# Patient Record
Sex: MALE | Race: WHITE | NOT HISPANIC OR LATINO | Employment: OTHER | URBAN - METROPOLITAN AREA
[De-identification: names, ages, dates, MRNs, and addresses within clinical notes are randomized per-mention and may not be internally consistent; named-entity substitution may affect disease eponyms.]

---

## 2017-01-30 ENCOUNTER — HOSPITAL ENCOUNTER (OUTPATIENT)
Dept: PAIN MEDICINE | Facility: HOSPITAL | Age: 72
Discharge: HOME | End: 2017-01-30
Attending: PHYSICAL MEDICINE & REHABILITATION | Admitting: PHYSICAL MEDICINE & REHABILITATION

## 2017-04-13 ENCOUNTER — HOSPITAL ENCOUNTER (OUTPATIENT)
Dept: PAIN MEDICINE | Facility: HOSPITAL | Age: 72
Discharge: HOME | End: 2017-04-13
Attending: PHYSICAL MEDICINE & REHABILITATION | Admitting: PHYSICAL MEDICINE & REHABILITATION

## 2017-04-13 LAB
AMPHETAMINES UR QL SCN: NEGATIVE
BZE UR QL SCN: NEGATIVE
CREAT 24H UR-MCNC: NORMAL MG/DL
METHADONE UR QL SCN: NEGATIVE
OPIATE CONFIRMATION URINE: NORMAL
THC SERPLBLD CFM-MCNC: NEGATIVE NG/ML

## 2017-06-08 ENCOUNTER — HOSPITAL ENCOUNTER (OUTPATIENT)
Dept: PAIN MEDICINE | Facility: HOSPITAL | Age: 72
Discharge: HOME | End: 2017-06-08
Attending: PHYSICAL MEDICINE & REHABILITATION | Admitting: PHYSICAL MEDICINE & REHABILITATION

## 2017-08-10 ENCOUNTER — HOSPITAL ENCOUNTER (OUTPATIENT)
Dept: PAIN MEDICINE | Facility: HOSPITAL | Age: 72
Discharge: HOME | End: 2017-08-10
Attending: PHYSICAL MEDICINE & REHABILITATION | Admitting: PHYSICAL MEDICINE & REHABILITATION

## 2017-10-24 ENCOUNTER — HOSPITAL ENCOUNTER (OUTPATIENT)
Dept: PAIN MEDICINE | Facility: HOSPITAL | Age: 72
Discharge: HOME | End: 2017-10-24
Attending: PHYSICAL MEDICINE & REHABILITATION | Admitting: PHYSICAL MEDICINE & REHABILITATION

## 2017-11-09 ENCOUNTER — OFFICE (OUTPATIENT)
Dept: URBAN - METROPOLITAN AREA CLINIC 64 | Facility: CLINIC | Age: 72
End: 2017-11-09

## 2017-11-09 VITALS
SYSTOLIC BLOOD PRESSURE: 155 MMHG | HEART RATE: 80 BPM | HEIGHT: 73 IN | WEIGHT: 252 LBS | DIASTOLIC BLOOD PRESSURE: 88 MMHG

## 2017-11-09 DIAGNOSIS — K76.0 FATTY (CHANGE OF) LIVER, NOT ELSEWHERE CLASSIFIED: ICD-10-CM

## 2017-11-09 DIAGNOSIS — Z86.010 PERSONAL HISTORY OF COLONIC POLYPS: ICD-10-CM

## 2017-11-09 DIAGNOSIS — K29.50 UNSPECIFIED CHRONIC GASTRITIS WITHOUT BLEEDING: ICD-10-CM

## 2017-11-09 DIAGNOSIS — K21.9 GASTRO-ESOPHAGEAL REFLUX DISEASE WITHOUT ESOPHAGITIS: ICD-10-CM

## 2017-11-09 PROCEDURE — 99213 OFFICE O/P EST LOW 20 MIN: CPT | Performed by: NURSE PRACTITIONER

## 2017-12-19 ENCOUNTER — HOSPITAL ENCOUNTER (OUTPATIENT)
Dept: PAIN MEDICINE | Facility: HOSPITAL | Age: 72
Discharge: HOME | End: 2017-12-19
Attending: PHYSICAL MEDICINE & REHABILITATION | Admitting: PHYSICAL MEDICINE & REHABILITATION

## 2018-02-26 ENCOUNTER — HOSPITAL ENCOUNTER (OUTPATIENT)
Dept: PAIN MEDICINE | Facility: HOSPITAL | Age: 73
Discharge: HOME | End: 2018-02-26
Attending: PHYSICAL MEDICINE & REHABILITATION | Admitting: PHYSICAL MEDICINE & REHABILITATION

## 2018-04-30 ENCOUNTER — HOSPITAL ENCOUNTER (OUTPATIENT)
Dept: PAIN MEDICINE | Facility: HOSPITAL | Age: 73
Discharge: HOME | End: 2018-04-30
Attending: PHYSICAL MEDICINE & REHABILITATION | Admitting: PHYSICAL MEDICINE & REHABILITATION

## 2018-07-02 ENCOUNTER — HOSPITAL ENCOUNTER (OUTPATIENT)
Dept: PAIN MEDICINE | Facility: HOSPITAL | Age: 73
Discharge: HOME | End: 2018-07-02
Attending: PHYSICAL MEDICINE & REHABILITATION | Admitting: PHYSICAL MEDICINE & REHABILITATION

## 2018-07-05 ENCOUNTER — HOSPITAL ENCOUNTER (OUTPATIENT)
Dept: INFUSION THERAPY | Facility: HOSPITAL | Age: 73
Discharge: HOME | End: 2018-07-05
Attending: FAMILY MEDICINE | Admitting: FAMILY MEDICINE

## 2018-09-25 ENCOUNTER — HOSPITAL ENCOUNTER (OUTPATIENT)
Dept: PAIN MEDICINE | Facility: HOSPITAL | Age: 73
Discharge: HOME | End: 2018-09-25
Attending: PHYSICAL MEDICINE & REHABILITATION | Admitting: PHYSICAL MEDICINE & REHABILITATION

## 2018-11-26 ENCOUNTER — HOSPITAL ENCOUNTER (OUTPATIENT)
Dept: PAIN MEDICINE | Facility: HOSPITAL | Age: 73
Discharge: HOME | End: 2018-11-26
Attending: PHYSICAL MEDICINE & REHABILITATION | Admitting: PHYSICAL MEDICINE & REHABILITATION

## 2019-02-19 ENCOUNTER — HOSPITAL ENCOUNTER (OUTPATIENT)
Dept: PAIN MEDICINE | Facility: HOSPITAL | Age: 74
Discharge: HOME | End: 2019-02-19
Attending: PHYSICAL MEDICINE & REHABILITATION | Admitting: PHYSICAL MEDICINE & REHABILITATION

## 2019-02-19 LAB
AMPHETAMINES UR QL SCN: NEGATIVE
BARBITURATES UR QL SCN: NEGATIVE
BENZODIAZ UR QL SCN: NEGATIVE
BZE UR QL SCN: NEGATIVE
CREAT 24H UR-MCNC: ABNORMAL MG/DL
METHADONE UR QL SCN: NEGATIVE
OPIATE CONFIRMATION URINE: ABNORMAL
OPIATES TESTED UR SCN: ABNORMAL
PCP UR QL: NEGATIVE
THC SERPLBLD CFM-MCNC: NEGATIVE NG/ML

## 2019-05-21 ENCOUNTER — HOSPITAL ENCOUNTER (OUTPATIENT)
Dept: PAIN MEDICINE | Facility: HOSPITAL | Age: 74
Discharge: HOME | End: 2019-05-21
Attending: PHYSICAL MEDICINE & REHABILITATION | Admitting: PHYSICAL MEDICINE & REHABILITATION

## 2019-07-17 ENCOUNTER — OFFICE VISIT (OUTPATIENT)
Dept: SURGERY | Facility: CLINIC | Age: 74
End: 2019-07-17

## 2019-07-17 VITALS
OXYGEN SATURATION: 98 % | TEMPERATURE: 97 F | BODY MASS INDEX: 33.32 KG/M2 | WEIGHT: 246 LBS | HEART RATE: 86 BPM | DIASTOLIC BLOOD PRESSURE: 69 MMHG | HEIGHT: 72 IN | SYSTOLIC BLOOD PRESSURE: 128 MMHG

## 2019-07-17 DIAGNOSIS — L98.491 SKIN ULCER, LIMITED TO BREAKDOWN OF SKIN (HCC): Primary | ICD-10-CM

## 2019-07-17 PROBLEM — L98.499 SKIN ULCER (HCC): Status: ACTIVE | Noted: 2019-07-17

## 2019-07-17 PROCEDURE — 99203 OFFICE O/P NEW LOW 30 MIN: CPT | Performed by: SURGERY

## 2019-07-17 RX ORDER — CLOPIDOGREL BISULFATE 75 MG/1
75 TABLET ORAL DAILY
COMMUNITY
Start: 2019-06-24 | End: 2019-12-06 | Stop reason: HOSPADM

## 2019-07-17 RX ORDER — DIGOXIN 250 MCG
0.25 TABLET ORAL DAILY
COMMUNITY
End: 2019-12-18

## 2019-07-17 RX ORDER — TEMAZEPAM 30 MG/1
30 CAPSULE ORAL
COMMUNITY
End: 2019-12-06 | Stop reason: HOSPADM

## 2019-07-17 RX ORDER — ALBUTEROL SULFATE 90 UG/1
2 AEROSOL, METERED RESPIRATORY (INHALATION) EVERY 4 HOURS PRN
COMMUNITY
Start: 2019-07-01

## 2019-07-17 RX ORDER — DILTIAZEM HYDROCHLORIDE 120 MG/1
120 CAPSULE, COATED, EXTENDED RELEASE ORAL DAILY
COMMUNITY
End: 2020-12-21

## 2019-07-17 RX ORDER — LOSARTAN POTASSIUM 50 MG/1
50 TABLET ORAL EVERY MORNING
COMMUNITY
Start: 2019-06-04 | End: 2021-10-21 | Stop reason: HOSPADM

## 2019-07-17 RX ORDER — TRAZODONE HYDROCHLORIDE 100 MG/1
100 TABLET ORAL NIGHTLY
Status: ON HOLD | COMMUNITY
Start: 2019-06-20 | End: 2021-10-14

## 2019-07-17 RX ORDER — DESVENLAFAXINE SUCCINATE 50 MG/1
50 TABLET, EXTENDED RELEASE ORAL DAILY
Status: ON HOLD | COMMUNITY
End: 2019-12-03

## 2019-07-17 RX ORDER — MIRTAZAPINE 30 MG/1
30 TABLET, FILM COATED ORAL NIGHTLY
COMMUNITY
Start: 2019-07-11

## 2019-07-17 RX ORDER — ERGOCALCIFEROL 1.25 MG/1
50000 CAPSULE ORAL
COMMUNITY
End: 2020-06-08

## 2019-07-17 RX ORDER — HYDROCHLOROTHIAZIDE 25 MG/1
TABLET ORAL
COMMUNITY
Start: 2019-07-10 | End: 2019-12-06 | Stop reason: HOSPADM

## 2019-07-17 RX ORDER — OXYCODONE AND ACETAMINOPHEN 10; 325 MG/1; MG/1
TABLET ORAL
COMMUNITY
Start: 2019-06-25 | End: 2019-08-27 | Stop reason: SDUPTHER

## 2019-07-17 RX ORDER — FUROSEMIDE 40 MG/1
40 TABLET ORAL EVERY MORNING
COMMUNITY

## 2019-07-17 NOTE — PROGRESS NOTES
Subjective   Ro Nathan is a 73 y.o. male.     History of present illness  Mr. Nathan seen in the office today at the request of Dr. Jones for ulceration of his mid abdominal wall/skin recently bled profusely this is the first time this is ever happened.  States with each heartbeat but with support through both feet.  Are able to hold pressure and get it to stop.  Now has 2 small skin openings near his umbilicus.  He has had multiple abdominal surgeries.  It all started with a small umbilical hernia he states years ago and he was in hospital in a coma for 30 days sounds like he got septic and had to have mesh removed he said multiple exporter since then.  He has  a very large loss of domain incisional hernia.  He is on Plavix which complicates he is having no bleeding from this area.    Past Medical History:   Diagnosis Date   • Bruises easily    • Congenital heart defect    • Difficulty attaining erection    • Hypertension    • Poor circulation    • Shortness of breath        History reviewed. No pertinent surgical history.    Outpatient Encounter Medications as of 7/17/2019   Medication Sig Dispense Refill   • clopidogrel (PLAVIX) 75 MG tablet Take 75 mg by mouth Daily.     • desvenlafaxine (PRISTIQ) 50 MG 24 hr tablet Take 50 mg by mouth Daily.     • digoxin (LANOXIN) 250 MCG tablet Take 0.25 mg by mouth Daily.     • diltiaZEM CD (CARDIZEM CD) 120 MG 24 hr capsule Take 120 mg by mouth Daily.     • furosemide (LASIX) 40 MG tablet Take 40 mg by mouth.     • hydrochlorothiazide (HYDRODIURIL) 25 MG tablet      • losartan (COZAAR) 100 MG tablet      • LYRICA 150 MG capsule      • metoprolol tartrate (LOPRESSOR) 25 MG tablet Take 25 mg by mouth.     • mirtazapine (REMERON) 30 MG tablet      • mupirocin (BACTROBAN) 2 % ointment      • oxyCODONE-acetaminophen (PERCOCET)  MG per tablet      • Potassium Chloride 40 MEQ/15ML (20%) solution Take 10 mEq by mouth 3 (Three) Times a Day.     • temazepam (RESTORIL)  30 MG capsule Take 30 mg by mouth.     • traZODone (DESYREL) 100 MG tablet      • VENTOLIN  (90 Base) MCG/ACT inhaler As Needed.     • vitamin D (ERGOCALCIFEROL) 23702 units capsule capsule Take 50,000 Units by mouth.       No facility-administered encounter medications on file as of 7/17/2019.        Allergies   Allergen Reactions   • Haloperidol Other (See Comments)   • Morphine Itching   • Pantoprazole Other (See Comments)     Feels sick after receiving       History reviewed. No pertinent family history.    Social History     Socioeconomic History   • Marital status:      Spouse name: Not on file   • Number of children: Not on file   • Years of education: Not on file   • Highest education level: Not on file   Tobacco Use   • Smoking status: Former Smoker   • Smokeless tobacco: Never Used   Substance and Sexual Activity   • Alcohol use: No     Frequency: Never   • Drug use: No   • Sexual activity: Defer       The following portions of the patient's history were reviewed and updated as appropriate: allergies, current medications, past family history, past medical history, past social history, past surgical history and problem list.    Review of Systems    Objective   Physical Exam      Assessment/Plan   Ro was seen today for abdominal fistula.    Diagnoses and all orders for this visit:    Skin ulcer, limited to breakdown of skin (CMS/[facility])    Review of systems shows no recent change in his vision or hearing.  No change in his ability to chew eat or swallow.  He has no chest pain or shortness of breath.  No cough or wheeze.  No change in his bladder or bowel habit.  No history of anxiety or depression.  No swollen gland.  No orthopedic injury or illness.  No disease.  No endocrine disease.  No blood borne illness or transfusion or anemia.    Exam shows a pleasant 73-year-old male.  HEENT is negative heart is regular lungs are clear abdomen is markedly obese with loss of domain for large  incisional hernia.  He has 2 small skin openings about 3 mm each near the umbilicus.  No active bleeding from either presently.  He denies succus entericus coming from the opening.  Extremities show equal range of motion in the upper and lower extremities.  He has symmetrical strength and usage.  Neuro shows no obvious focal deficit.    Impression: 83-year-old with 2 skin ulcers 1 with sounds like arterial bleeding.    Plan: With his history of cirrhosis we will do ultrasound of the paraumbilical area to make sure there are no large vessels right under the skin.  If they are not and we will do a CT scan to see does have bowel stuck to this area.  We will try to avoid any surgical intervention at this time he may need to go to wound care center chronic wound treatment               Hill Bhatia DO  7/17/2019  4:22 PM

## 2019-07-24 ENCOUNTER — HOSPITAL ENCOUNTER (OUTPATIENT)
Dept: ULTRASOUND IMAGING | Facility: HOSPITAL | Age: 74
Discharge: HOME | End: 2019-07-24
Admitting: SURGERY

## 2019-07-24 DIAGNOSIS — L98.491 SKIN ULCER, LIMITED TO BREAKDOWN OF SKIN (HCC): ICD-10-CM

## 2019-07-24 PROCEDURE — 76705 ECHO EXAM OF ABDOMEN: CPT

## 2019-08-06 ENCOUNTER — TELEPHONE (OUTPATIENT)
Dept: SURGERY | Facility: CLINIC | Age: 74
End: 2019-08-06

## 2019-08-06 NOTE — TELEPHONE ENCOUNTER
Pt's wife calling to get US results from Abdomen US done on 7/17/19. I let her know that they need to come in to see DR. Bhatia to find out results. Pt cannot be seen until 8/12/19. Pt is scheduled for Monday 8/12/19 @ 1:50 pm.

## 2019-08-12 ENCOUNTER — OFFICE VISIT (OUTPATIENT)
Dept: SURGERY | Facility: CLINIC | Age: 74
End: 2019-08-12

## 2019-08-12 VITALS
BODY MASS INDEX: 33.29 KG/M2 | SYSTOLIC BLOOD PRESSURE: 148 MMHG | HEIGHT: 72 IN | DIASTOLIC BLOOD PRESSURE: 86 MMHG | HEART RATE: 89 BPM | TEMPERATURE: 96.7 F | WEIGHT: 245.8 LBS | OXYGEN SATURATION: 98 %

## 2019-08-12 DIAGNOSIS — L98.491 SKIN ULCER, LIMITED TO BREAKDOWN OF SKIN (HCC): Primary | ICD-10-CM

## 2019-08-12 PROCEDURE — 99212 OFFICE O/P EST SF 10 MIN: CPT | Performed by: SURGERY

## 2019-08-12 NOTE — PROGRESS NOTES
Subjective   Ro Nathan is a 73 y.o. male.     History of present illness  Mr. Nathan seen in the office today follow-up from the ultrasound done of the umbilical skin ulcer.  We had seen Dr. Jones's request urgently for bleeding from a skin ulcer in the area of previous midline surgery.  We did an ultrasound to make sure that he did not have large veins beneath this or other issue.    The ultrasound of the area looked unremarkable.  There are no enlarged or varicose veins.    Past Medical History:   Diagnosis Date   • Bruises easily    • Congenital heart defect    • Difficulty attaining erection    • Hypertension    • Poor circulation    • Shortness of breath        No past surgical history on file.    Outpatient Encounter Medications as of 8/12/2019   Medication Sig Dispense Refill   • clopidogrel (PLAVIX) 75 MG tablet Take 75 mg by mouth Daily.     • desvenlafaxine (PRISTIQ) 50 MG 24 hr tablet Take 50 mg by mouth Daily.     • digoxin (LANOXIN) 250 MCG tablet Take 0.25 mg by mouth Daily.     • diltiaZEM CD (CARDIZEM CD) 120 MG 24 hr capsule Take 120 mg by mouth Daily.     • furosemide (LASIX) 40 MG tablet Take 40 mg by mouth.     • hydrochlorothiazide (HYDRODIURIL) 25 MG tablet      • losartan (COZAAR) 100 MG tablet      • LYRICA 150 MG capsule      • metoprolol tartrate (LOPRESSOR) 25 MG tablet Take 25 mg by mouth.     • mirtazapine (REMERON) 30 MG tablet      • mupirocin (BACTROBAN) 2 % ointment      • oxyCODONE-acetaminophen (PERCOCET)  MG per tablet      • Potassium Chloride 40 MEQ/15ML (20%) solution Take 10 mEq by mouth 3 (Three) Times a Day.     • temazepam (RESTORIL) 30 MG capsule Take 30 mg by mouth.     • traZODone (DESYREL) 100 MG tablet      • VENTOLIN  (90 Base) MCG/ACT inhaler As Needed.     • vitamin D (ERGOCALCIFEROL) 98363 units capsule capsule Take 50,000 Units by mouth.       No facility-administered encounter medications on file as of 8/12/2019.        Allergies   Allergen  Reactions   • Haloperidol Other (See Comments)   • Morphine Itching   • Pantoprazole Other (See Comments)     Feels sick after receiving       No family history on file.    Social History     Socioeconomic History   • Marital status:      Spouse name: Not on file   • Number of children: Not on file   • Years of education: Not on file   • Highest education level: Not on file   Tobacco Use   • Smoking status: Former Smoker   • Smokeless tobacco: Never Used   Substance and Sexual Activity   • Alcohol use: No     Frequency: Never   • Drug use: No   • Sexual activity: Defer       The following portions of the patient's history were reviewed and updated as appropriate: allergies, current medications, past family history, past medical history, past social history, past surgical history and problem list.    Objective       Assessment/Plan   Ro was seen today for follow-up.    Diagnoses and all orders for this visit:    Skin ulcer, limited to breakdown of skin (CMS/[facility])          Impression: The ulcer is almost healed    Recommendation: No plans for surgery at this time.  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         Hill Bhatia DO  8/12/2019  2:56 PM

## 2019-08-27 ENCOUNTER — OFFICE VISIT (OUTPATIENT)
Dept: PAIN MEDICINE | Facility: CLINIC | Age: 74
End: 2019-08-27

## 2019-08-27 VITALS
WEIGHT: 240 LBS | RESPIRATION RATE: 16 BRPM | OXYGEN SATURATION: 96 % | DIASTOLIC BLOOD PRESSURE: 85 MMHG | HEART RATE: 86 BPM | TEMPERATURE: 98.2 F | SYSTOLIC BLOOD PRESSURE: 163 MMHG | HEIGHT: 73 IN | BODY MASS INDEX: 31.81 KG/M2

## 2019-08-27 DIAGNOSIS — M54.50 CHRONIC MIDLINE LOW BACK PAIN WITHOUT SCIATICA: Primary | ICD-10-CM

## 2019-08-27 DIAGNOSIS — G89.29 CHRONIC MIDLINE LOW BACK PAIN WITHOUT SCIATICA: Primary | ICD-10-CM

## 2019-08-27 DIAGNOSIS — M47.817 SPONDYLOSIS OF LUMBOSACRAL REGION WITHOUT MYELOPATHY OR RADICULOPATHY: ICD-10-CM

## 2019-08-27 PROCEDURE — 99213 OFFICE O/P EST LOW 20 MIN: CPT | Performed by: PHYSICAL MEDICINE & REHABILITATION

## 2019-08-27 PROCEDURE — G0463 HOSPITAL OUTPT CLINIC VISIT: HCPCS | Performed by: PHYSICAL MEDICINE & REHABILITATION

## 2019-08-27 RX ORDER — OXYCODONE AND ACETAMINOPHEN 10; 325 MG/1; MG/1
1 TABLET ORAL EVERY 6 HOURS PRN
Qty: 120 TABLET | Refills: 0 | Status: SHIPPED | OUTPATIENT
Start: 2019-08-27 | End: 2019-08-27 | Stop reason: SDUPTHER

## 2019-08-27 RX ORDER — OXYCODONE AND ACETAMINOPHEN 10; 325 MG/1; MG/1
1 TABLET ORAL EVERY 6 HOURS PRN
Qty: 120 TABLET | Refills: 0 | Status: SHIPPED | OUTPATIENT
Start: 2019-08-27 | End: 2019-10-17 | Stop reason: SDUPTHER

## 2019-08-27 NOTE — PROGRESS NOTES
Chief Complaint   Patient presents with   • Back Pain     Low back, Oxycodone 8/27/19 @0900, pain scale #4         Subjective HPI  Ro Nathan is a 73 y.o. male  who presents to the office for follow-up. Patient is new to me.He has low back pain. CT L-spine shows multilevel DDD with mild-mod stenosis at L3/4. He rates his pain at 5/10 today. Medicine and rest provide some relief. Walking and standing make the pain worse. Denies bowel or bladder incontinence. Last UDS 2/19/2019.    PEG Assessment   What number best describes your pain on average in the past week?4  What number best describes how, during the past week, pain has interfered with your enjoyment of life?4  What number best describes how, during the past week, pain has interfered with your general activity?  4      The following portions of the patient's history were reviewed and updated as appropriate: allergies, current medications, past family history, past medical history, past social history, past surgical history and problem list.    Review of Systems   Constitutional: Negative for appetite change.   Respiratory: Negative for chest tightness and shortness of breath.    Cardiovascular: Negative for chest pain.   Gastrointestinal: Negative for abdominal pain, constipation and diarrhea.   Genitourinary: Negative for difficulty urinating.   Musculoskeletal: Positive for back pain.   Neurological: Negative for weakness, numbness and headaches.   Psychiatric/Behavioral: Positive for sleep disturbance. Negative for behavioral problems and decreased concentration.       Vitals:    08/27/19 1420   BP: 163/85   Pulse: 86   Resp: 16   Temp: 98.2 °F (36.8 °C)   SpO2: 96%       Objective   Physical Exam   Constitutional: He is oriented to person, place, and time. He appears well-developed and well-nourished.   HENT:   Head: Normocephalic and atraumatic.   Eyes: EOM are normal.   PER   Neck: Neck supple.   Cardiovascular: Normal rate, regular rhythm and  normal heart sounds.   No murmur heard.  Pulmonary/Chest: Effort normal and breath sounds normal. No respiratory distress. He has no wheezes.   Abdominal: Soft. Bowel sounds are normal. There is no tenderness.   Musculoskeletal:   Calves S/NT. No edema BLE.  DURAN.  Low Back TTP L4-S1. Decreased ROM with flexion, extension and S to S bending.   Neurological: He is alert and oriented to person, place, and time. He displays no atrophy. No sensory deficit. He exhibits normal muscle tone.   Reflex Scores:       Patellar reflexes are 1+ on the right side and 1+ on the left side.       Achilles reflexes are 0 on the right side and 0 on the left side.  MMT: Strength 5/5 BLE   Skin: Skin is warm and dry.   Psychiatric: He has a normal mood and affect. His behavior is normal.   Vitals reviewed.      Assessment/Plan   Ro was seen today for back pain.    Diagnoses and all orders for this visit:    Chronic midline low back pain without sciatica    Spondylosis of lumbosacral region without myelopathy or radiculopathy    Other orders  -     Discontinue: oxyCODONE-acetaminophen (PERCOCET)  MG per tablet; Take 1 tablet by mouth Every 6 (Six) Hours As Needed for Moderate Pain .  -     oxyCODONE-acetaminophen (PERCOCET)  MG per tablet; Take 1 tablet by mouth Every 6 (Six) Hours As Needed for Moderate Pain .    Continue percocet.    Return in about 2 months (around 10/27/2019) for Pain Mgmt.         INSPECT REPORT    As part of the patient's treatment plan, I am prescribing controlled substances. The patient has been made aware of appropriate use of such medications, including potential risk of somnolence, limited ability to drive and/or work safely, and the potential for dependence or overdose. It has also bee made clear that these medications are for use by this patient only, without concomitant use of alcohol or other substances unless prescribed.     Patient has completed prescribing agreement detailing terms of  continued prescribing of controlled substances, including monitoring INSPECT reports, urine drug screening, and pill counts if necessary. The patient is aware that inappropriate use will results in cessation of prescribing such medications.    INSPECT report has been reviewed and scanned into the patient's chart.    As the clinician, I personally reviewed the INSPECT from 8/26/2019 while the patient was in the office today.    History and physical exam exhibit continued safe and appropriate use of controlled substances.

## 2019-10-17 ENCOUNTER — OFFICE VISIT (OUTPATIENT)
Dept: PAIN MEDICINE | Facility: CLINIC | Age: 74
End: 2019-10-17

## 2019-10-17 VITALS
SYSTOLIC BLOOD PRESSURE: 156 MMHG | TEMPERATURE: 98.1 F | BODY MASS INDEX: 32.47 KG/M2 | OXYGEN SATURATION: 96 % | HEART RATE: 87 BPM | WEIGHT: 245 LBS | RESPIRATION RATE: 16 BRPM | HEIGHT: 73 IN | DIASTOLIC BLOOD PRESSURE: 85 MMHG

## 2019-10-17 DIAGNOSIS — M51.36 DDD (DEGENERATIVE DISC DISEASE), LUMBAR: ICD-10-CM

## 2019-10-17 DIAGNOSIS — M47.817 SPONDYLOSIS OF LUMBOSACRAL REGION WITHOUT MYELOPATHY OR RADICULOPATHY: ICD-10-CM

## 2019-10-17 DIAGNOSIS — M54.50 CHRONIC MIDLINE LOW BACK PAIN WITHOUT SCIATICA: Primary | ICD-10-CM

## 2019-10-17 DIAGNOSIS — G89.29 CHRONIC MIDLINE LOW BACK PAIN WITHOUT SCIATICA: Primary | ICD-10-CM

## 2019-10-17 PROCEDURE — G0463 HOSPITAL OUTPT CLINIC VISIT: HCPCS | Performed by: PHYSICAL MEDICINE & REHABILITATION

## 2019-10-17 PROCEDURE — 99213 OFFICE O/P EST LOW 20 MIN: CPT | Performed by: PHYSICAL MEDICINE & REHABILITATION

## 2019-10-17 RX ORDER — METOPROLOL TARTRATE 50 MG/1
TABLET, FILM COATED ORAL
COMMUNITY
Start: 2019-09-03 | End: 2019-12-06 | Stop reason: HOSPADM

## 2019-10-17 RX ORDER — OXYCODONE AND ACETAMINOPHEN 10; 325 MG/1; MG/1
1 TABLET ORAL EVERY 6 HOURS PRN
Qty: 120 TABLET | Refills: 0 | Status: SHIPPED | OUTPATIENT
Start: 2019-10-17 | End: 2019-10-17 | Stop reason: SDUPTHER

## 2019-10-17 RX ORDER — OXYCODONE AND ACETAMINOPHEN 10; 325 MG/1; MG/1
1 TABLET ORAL EVERY 6 HOURS PRN
Qty: 120 TABLET | Refills: 0 | Status: SHIPPED | OUTPATIENT
Start: 2019-10-17 | End: 2020-01-30 | Stop reason: SDUPTHER
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
## 2019-12-03 ENCOUNTER — APPOINTMENT (OUTPATIENT)
Dept: GENERAL RADIOLOGY | Facility: HOSPITAL | Age: 74
End: 2019-12-03

## 2019-12-03 ENCOUNTER — HOSPITAL ENCOUNTER (INPATIENT)
Facility: HOSPITAL | Age: 74
LOS: 1 days | Discharge: HOME | End: 2019-12-06
Attending: FAMILY MEDICINE | Admitting: INTERNAL MEDICINE

## 2019-12-03 DIAGNOSIS — I20.0 UNSTABLE ANGINA (HCC): Primary | ICD-10-CM

## 2019-12-03 DIAGNOSIS — Z95.5 STATUS POST CORONARY ARTERY STENT PLACEMENT: ICD-10-CM

## 2019-12-03 PROBLEM — I25.10 CORONARY ARTERY DISEASE: Status: ACTIVE | Noted: 2019-12-03

## 2019-12-03 PROBLEM — E78.5 HYPERLIPIDEMIA: Status: ACTIVE | Noted: 2019-12-03

## 2019-12-03 PROBLEM — M20.41 OTHER HAMMER TOE(S) (ACQUIRED), RIGHT FOOT: Status: ACTIVE | Noted: 2018-09-06

## 2019-12-03 PROBLEM — K21.9 GASTROESOPHAGEAL REFLUX DISEASE: Status: ACTIVE | Noted: 2019-12-03

## 2019-12-03 PROBLEM — I10 HYPERTENSION: Status: ACTIVE | Noted: 2019-12-03

## 2019-12-03 PROBLEM — J44.9 CHRONIC OBSTRUCTIVE PULMONARY DISEASE: Status: ACTIVE | Noted: 2019-12-03

## 2019-12-03 LAB
ALBUMIN SERPL-MCNC: 3.9 G/DL (ref 3.5–5.2)
ALBUMIN/GLOB SERPL: 1.3 G/DL
ALP SERPL-CCNC: 124 U/L (ref 39–117)
ALT SERPL W P-5'-P-CCNC: 10 U/L (ref 1–41)
ANION GAP SERPL CALCULATED.3IONS-SCNC: 10 MMOL/L (ref 5–15)
AST SERPL-CCNC: 17 U/L (ref 1–40)
BASOPHILS # BLD AUTO: 0.1 10*3/MM3 (ref 0–0.2)
BASOPHILS NFR BLD AUTO: 1.2 % (ref 0–1.5)
BILIRUB SERPL-MCNC: 0.5 MG/DL (ref 0.2–1.2)
BUN BLD-MCNC: 27 MG/DL (ref 8–23)
BUN/CREAT SERPL: 20.9 (ref 7–25)
CALCIUM SPEC-SCNC: 8.8 MG/DL (ref 8.6–10.5)
CHLORIDE SERPL-SCNC: 103 MMOL/L (ref 98–107)
CO2 SERPL-SCNC: 26 MMOL/L (ref 22–29)
CREAT BLD-MCNC: 1.29 MG/DL (ref 0.76–1.27)
D DIMER PPP FEU-MCNC: 0.47 MCGFEU/ML (ref 0.17–0.59)
DEPRECATED RDW RBC AUTO: 49 FL (ref 37–54)
EOSINOPHIL # BLD AUTO: 0.5 10*3/MM3 (ref 0–0.4)
EOSINOPHIL NFR BLD AUTO: 5.6 % (ref 0.3–6.2)
ERYTHROCYTE [DISTWIDTH] IN BLOOD BY AUTOMATED COUNT: 15.3 % (ref 12.3–15.4)
GFR SERPL CREATININE-BSD FRML MDRD: 55 ML/MIN/1.73
GLOBULIN UR ELPH-MCNC: 3 GM/DL
GLUCOSE BLD-MCNC: 116 MG/DL (ref 65–99)
GLUCOSE BLDC GLUCOMTR-MCNC: 137 MG/DL (ref 70–105)
HCT VFR BLD AUTO: 42.5 % (ref 37.5–51)
HGB BLD-MCNC: 15.2 G/DL (ref 13–17.7)
LYMPHOCYTES # BLD AUTO: 1.8 10*3/MM3 (ref 0.7–3.1)
LYMPHOCYTES NFR BLD AUTO: 22 % (ref 19.6–45.3)
MCH RBC QN AUTO: 32.8 PG (ref 26.6–33)
MCHC RBC AUTO-ENTMCNC: 35.8 G/DL (ref 31.5–35.7)
MCV RBC AUTO: 91.7 FL (ref 79–97)
MONOCYTES # BLD AUTO: 0.7 10*3/MM3 (ref 0.1–0.9)
MONOCYTES NFR BLD AUTO: 8.9 % (ref 5–12)
NEUTROPHILS # BLD AUTO: 5.1 10*3/MM3 (ref 1.7–7)
NEUTROPHILS NFR BLD AUTO: 62.3 % (ref 42.7–76)
NRBC BLD AUTO-RTO: 0.1 /100 WBC (ref 0–0.2)
NT-PROBNP SERPL-MCNC: 445.9 PG/ML (ref 5–900)
PLATELET # BLD AUTO: 301 10*3/MM3 (ref 140–450)
PMV BLD AUTO: 8 FL (ref 6–12)
POTASSIUM BLD-SCNC: 4.4 MMOL/L (ref 3.5–5.2)
PROT SERPL-MCNC: 6.9 G/DL (ref 6–8.5)
RBC # BLD AUTO: 4.63 10*6/MM3 (ref 4.14–5.8)
SODIUM BLD-SCNC: 139 MMOL/L (ref 136–145)
TROPONIN T SERPL-MCNC: <0.01 NG/ML (ref 0–0.03)
WBC NRBC COR # BLD: 8.2 10*3/MM3 (ref 3.4–10.8)

## 2019-12-03 PROCEDURE — 80053 COMPREHEN METABOLIC PANEL: CPT | Performed by: FAMILY MEDICINE

## 2019-12-03 PROCEDURE — 85379 FIBRIN DEGRADATION QUANT: CPT | Performed by: FAMILY MEDICINE

## 2019-12-03 PROCEDURE — 94640 AIRWAY INHALATION TREATMENT: CPT

## 2019-12-03 PROCEDURE — 25010000002 METHYLPREDNISOLONE PER 40 MG: Performed by: FAMILY MEDICINE

## 2019-12-03 PROCEDURE — 85025 COMPLETE CBC W/AUTO DIFF WBC: CPT | Performed by: FAMILY MEDICINE

## 2019-12-03 PROCEDURE — 83880 ASSAY OF NATRIURETIC PEPTIDE: CPT | Performed by: FAMILY MEDICINE

## 2019-12-03 PROCEDURE — 25010000002 AZITHROMYCIN PER 500 MG: Performed by: FAMILY MEDICINE

## 2019-12-03 PROCEDURE — 25010000002 HYDROMORPHONE PER 4 MG: Performed by: FAMILY MEDICINE

## 2019-12-03 PROCEDURE — G0378 HOSPITAL OBSERVATION PER HR: HCPCS

## 2019-12-03 PROCEDURE — 25010000002 CEFTRIAXONE PER 250 MG: Performed by: FAMILY MEDICINE

## 2019-12-03 PROCEDURE — 82962 GLUCOSE BLOOD TEST: CPT

## 2019-12-03 PROCEDURE — 71046 X-RAY EXAM CHEST 2 VIEWS: CPT

## 2019-12-03 PROCEDURE — 84484 ASSAY OF TROPONIN QUANT: CPT | Performed by: FAMILY MEDICINE

## 2019-12-03 RX ORDER — IPRATROPIUM BROMIDE AND ALBUTEROL SULFATE 2.5; .5 MG/3ML; MG/3ML
3 SOLUTION RESPIRATORY (INHALATION)
Status: DISCONTINUED | OUTPATIENT
Start: 2019-12-03 | End: 2019-12-06 | Stop reason: HOSPADM

## 2019-12-03 RX ORDER — NICOTINE POLACRILEX 4 MG
15 LOZENGE BUCCAL
Status: DISCONTINUED | OUTPATIENT
Start: 2019-12-03 | End: 2019-12-06 | Stop reason: HOSPADM

## 2019-12-03 RX ORDER — CLOPIDOGREL BISULFATE 75 MG/1
75 TABLET ORAL DAILY
Status: DISCONTINUED | OUTPATIENT
Start: 2019-12-04 | End: 2019-12-05

## 2019-12-03 RX ORDER — DILTIAZEM HYDROCHLORIDE 120 MG/1
120 CAPSULE, COATED, EXTENDED RELEASE ORAL DAILY
Status: DISCONTINUED | OUTPATIENT
Start: 2019-12-04 | End: 2019-12-06 | Stop reason: HOSPADM

## 2019-12-03 RX ORDER — METHYLPREDNISOLONE SODIUM SUCCINATE 40 MG/ML
20 INJECTION, POWDER, LYOPHILIZED, FOR SOLUTION INTRAMUSCULAR; INTRAVENOUS EVERY 12 HOURS
Status: DISCONTINUED | OUTPATIENT
Start: 2019-12-03 | End: 2019-12-06 | Stop reason: HOSPADM

## 2019-12-03 RX ORDER — OXYCODONE HYDROCHLORIDE 5 MG/1
5 TABLET ORAL EVERY 4 HOURS PRN
Status: DISCONTINUED | OUTPATIENT
Start: 2019-12-03 | End: 2019-12-06 | Stop reason: HOSPADM

## 2019-12-03 RX ORDER — DIGOXIN 250 MCG
0.25 TABLET ORAL DAILY
Status: DISCONTINUED | OUTPATIENT
Start: 2019-12-04 | End: 2019-12-06 | Stop reason: HOSPADM

## 2019-12-03 RX ORDER — LOSARTAN POTASSIUM 50 MG/1
100 TABLET ORAL
Status: DISCONTINUED | OUTPATIENT
Start: 2019-12-04 | End: 2019-12-06 | Stop reason: HOSPADM

## 2019-12-03 RX ORDER — FUROSEMIDE 40 MG/1
40 TABLET ORAL DAILY
Status: DISCONTINUED | OUTPATIENT
Start: 2019-12-04 | End: 2019-12-04

## 2019-12-03 RX ORDER — DEXTROSE MONOHYDRATE 25 G/50ML
25 INJECTION, SOLUTION INTRAVENOUS
Status: DISCONTINUED | OUTPATIENT
Start: 2019-12-03 | End: 2019-12-06 | Stop reason: HOSPADM

## 2019-12-03 RX ORDER — HYDROMORPHONE HCL 110MG/55ML
0.5 PATIENT CONTROLLED ANALGESIA SYRINGE INTRAVENOUS EVERY 4 HOURS PRN
Status: DISCONTINUED | OUTPATIENT
Start: 2019-12-03 | End: 2019-12-06 | Stop reason: HOSPADM

## 2019-12-03 RX ORDER — SODIUM CHLORIDE 9 MG/ML
50 INJECTION, SOLUTION INTRAVENOUS CONTINUOUS
Status: DISCONTINUED | OUTPATIENT
Start: 2019-12-03 | End: 2019-12-06 | Stop reason: HOSPADM

## 2019-12-03 RX ORDER — PREGABALIN 75 MG/1
150 CAPSULE ORAL EVERY 12 HOURS SCHEDULED
Status: DISCONTINUED | OUTPATIENT
Start: 2019-12-03 | End: 2019-12-06 | Stop reason: HOSPADM

## 2019-12-03 RX ADMIN — METHYLPREDNISOLONE SODIUM SUCCINATE 20 MG: 40 INJECTION, POWDER, FOR SOLUTION INTRAMUSCULAR; INTRAVENOUS at 20:55

## 2019-12-03 RX ADMIN — HYDROMORPHONE HYDROCHLORIDE 0.5 MG: 2 INJECTION, SOLUTION INTRAMUSCULAR; INTRAVENOUS; SUBCUTANEOUS at 20:54

## 2019-12-03 RX ADMIN — SODIUM CHLORIDE 50 ML/HR: 900 INJECTION, SOLUTION INTRAVENOUS at 20:54

## 2019-12-03 RX ADMIN — CEFTRIAXONE SODIUM 1 G: 1 INJECTION, POWDER, FOR SOLUTION INTRAMUSCULAR; INTRAVENOUS at 20:59

## 2019-12-03 RX ADMIN — AZITHROMYCIN MONOHYDRATE 500 MG: 500 INJECTION, POWDER, LYOPHILIZED, FOR SOLUTION INTRAVENOUS at 21:43

## 2019-12-03 RX ADMIN — METOPROLOL TARTRATE 25 MG: 25 TABLET ORAL at 20:54

## 2019-12-03 RX ADMIN — IPRATROPIUM BROMIDE AND ALBUTEROL SULFATE 3 ML: .5; 3 SOLUTION RESPIRATORY (INHALATION) at 19:23

## 2019-12-03 RX ADMIN — PREGABALIN 150 MG: 75 CAPSULE ORAL at 20:54

## 2019-12-04 ENCOUNTER — HOSPITAL ENCOUNTER (INPATIENT)
Dept: CARDIOLOGY | Facility: HOSPITAL | Age: 74
Discharge: HOME | End: 2019-12-04

## 2019-12-04 ENCOUNTER — APPOINTMENT (OUTPATIENT)
Dept: GENERAL RADIOLOGY | Facility: HOSPITAL | Age: 74
End: 2019-12-04

## 2019-12-04 VITALS
WEIGHT: 249 LBS | HEIGHT: 73 IN | DIASTOLIC BLOOD PRESSURE: 69 MMHG | SYSTOLIC BLOOD PRESSURE: 125 MMHG | BODY MASS INDEX: 33 KG/M2

## 2019-12-04 PROBLEM — J44.1 COPD EXACERBATION (HCC): Status: ACTIVE | Noted: 2019-12-04

## 2019-12-04 PROBLEM — J44.9 COPD (CHRONIC OBSTRUCTIVE PULMONARY DISEASE): Status: ACTIVE | Noted: 2019-12-04

## 2019-12-04 LAB
BH CV ECHO MEAS - ACS: 2.4 CM
BH CV ECHO MEAS - AO MAX PG (FULL): 3.4 MMHG
BH CV ECHO MEAS - AO MAX PG: 10.1 MMHG
BH CV ECHO MEAS - AO MEAN PG (FULL): 1.9 MMHG
BH CV ECHO MEAS - AO MEAN PG: 6.2 MMHG
BH CV ECHO MEAS - AO ROOT AREA (BSA CORRECTED): 1.7
BH CV ECHO MEAS - AO ROOT AREA: 13.1 CM^2
BH CV ECHO MEAS - AO ROOT DIAM: 4.1 CM
BH CV ECHO MEAS - AO V2 MAX: 158.6 CM/SEC
BH CV ECHO MEAS - AO V2 MEAN: 119.9 CM/SEC
BH CV ECHO MEAS - AO V2 VTI: 32.6 CM
BH CV ECHO MEAS - AVA(I,A): 3.2 CM^2
BH CV ECHO MEAS - AVA(I,D): 3.2 CM^2
BH CV ECHO MEAS - AVA(V,A): 2.9 CM^2
BH CV ECHO MEAS - AVA(V,D): 2.9 CM^2
BH CV ECHO MEAS - BSA(HAYCOCK): 2.4 M^2
BH CV ECHO MEAS - BSA: 2.4 M^2
BH CV ECHO MEAS - BZI_BMI: 32.9 KILOGRAMS/M^2
BH CV ECHO MEAS - BZI_METRIC_HEIGHT: 185.4 CM
BH CV ECHO MEAS - BZI_METRIC_WEIGHT: 112.9 KG
BH CV ECHO MEAS - EDV(CUBED): 223.5 ML
BH CV ECHO MEAS - EDV(MOD-SP2): 81 ML
BH CV ECHO MEAS - EDV(MOD-SP4): 112.2 ML
BH CV ECHO MEAS - EDV(TEICH): 184.7 ML
BH CV ECHO MEAS - EF(CUBED): 67 %
BH CV ECHO MEAS - EF(MOD-BP): 72 %
BH CV ECHO MEAS - EF(MOD-SP2): 79.5 %
BH CV ECHO MEAS - EF(MOD-SP4): 79.7 %
BH CV ECHO MEAS - EF(TEICH): 57.6 %
BH CV ECHO MEAS - ESV(CUBED): 73.8 ML
BH CV ECHO MEAS - ESV(MOD-SP2): 16.6 ML
BH CV ECHO MEAS - ESV(MOD-SP4): 22.8 ML
BH CV ECHO MEAS - ESV(TEICH): 78.3 ML
BH CV ECHO MEAS - FS: 30.9 %
BH CV ECHO MEAS - IVS/LVPW: 1.1
BH CV ECHO MEAS - IVSD: 1.1 CM
BH CV ECHO MEAS - LA DIMENSION(2D): 4.8 CM
BH CV ECHO MEAS - LV DIASTOLIC VOL/BSA (35-75): 47.5 ML/M^2
BH CV ECHO MEAS - LV MASS(C)D: 280.8 GRAMS
BH CV ECHO MEAS - LV MASS(C)DI: 118.9 GRAMS/M^2
BH CV ECHO MEAS - LV MAX PG: 6.7 MMHG
BH CV ECHO MEAS - LV MEAN PG: 4.3 MMHG
BH CV ECHO MEAS - LV SYSTOLIC VOL/BSA (12-30): 9.6 ML/M^2
BH CV ECHO MEAS - LV V1 MAX: 129 CM/SEC
BH CV ECHO MEAS - LV V1 MEAN: 100.6 CM/SEC
BH CV ECHO MEAS - LV V1 VTI: 29.2 CM
BH CV ECHO MEAS - LVIDD: 6.1 CM
BH CV ECHO MEAS - LVIDS: 4.2 CM
BH CV ECHO MEAS - LVOT AREA: 3.6 CM^2
BH CV ECHO MEAS - LVOT DIAM: 2.1 CM
BH CV ECHO MEAS - LVPWD: 1.1 CM
BH CV ECHO MEAS - MR MAX PG: 145.1 MMHG
BH CV ECHO MEAS - MR MAX VEL: 602.2 CM/SEC
BH CV ECHO MEAS - MV DEC TIME: 0.2 SEC
BH CV ECHO MEAS - MV E MAX VEL: 114.7 CM/SEC
BH CV ECHO MEAS - MV MAX PG: 10.9 MMHG
BH CV ECHO MEAS - MV MEAN PG: 2.1 MMHG
BH CV ECHO MEAS - MV V2 MAX: 165 CM/SEC
BH CV ECHO MEAS - MV V2 MEAN: 58.8 CM/SEC
BH CV ECHO MEAS - MV V2 VTI: 31.2 CM
BH CV ECHO MEAS - MVA(VTI): 3.3 CM^2
BH CV ECHO MEAS - PA MAX PG (FULL): 3.1 MMHG
BH CV ECHO MEAS - PA MAX PG: 6.5 MMHG
BH CV ECHO MEAS - PA V2 MAX: 127.2 CM/SEC
BH CV ECHO MEAS - PVA(V,A): 2.4 CM^2
BH CV ECHO MEAS - PVA(V,D): 2.4 CM^2
BH CV ECHO MEAS - QP/QS: 0.58
BH CV ECHO MEAS - RAP SYSTOLE: 3 MMHG
BH CV ECHO MEAS - RV MAX PG: 3.4 MMHG
BH CV ECHO MEAS - RV MEAN PG: 2.2 MMHG
BH CV ECHO MEAS - RV V1 MAX: 92.6 CM/SEC
BH CV ECHO MEAS - RV V1 MEAN: 71.7 CM/SEC
BH CV ECHO MEAS - RV V1 VTI: 18.5 CM
BH CV ECHO MEAS - RVDD: 3.2 CM
BH CV ECHO MEAS - RVOT AREA: 3.3 CM^2
BH CV ECHO MEAS - RVOT DIAM: 2 CM
BH CV ECHO MEAS - RVSP: 32.8 MMHG
BH CV ECHO MEAS - SI(AO): 180.8 ML/M^2
BH CV ECHO MEAS - SI(CUBED): 63.4 ML/M^2
BH CV ECHO MEAS - SI(LVOT): 44.2 ML/M^2
BH CV ECHO MEAS - SI(MOD-SP2): 27.3 ML/M^2
BH CV ECHO MEAS - SI(MOD-SP4): 37.9 ML/M^2
BH CV ECHO MEAS - SI(TEICH): 45 ML/M^2
BH CV ECHO MEAS - SV(AO): 427 ML
BH CV ECHO MEAS - SV(CUBED): 149.7 ML
BH CV ECHO MEAS - SV(LVOT): 104.3 ML
BH CV ECHO MEAS - SV(MOD-SP2): 64.4 ML
BH CV ECHO MEAS - SV(MOD-SP4): 89.4 ML
BH CV ECHO MEAS - SV(RVOT): 60.9 ML
BH CV ECHO MEAS - SV(TEICH): 106.4 ML
BH CV ECHO MEAS - TR MAX VEL: 272.8 CM/SEC
GLUCOSE BLDC GLUCOMTR-MCNC: 121 MG/DL (ref 70–105)
GLUCOSE BLDC GLUCOMTR-MCNC: 178 MG/DL (ref 70–105)
GLUCOSE BLDC GLUCOMTR-MCNC: 192 MG/DL (ref 70–105)
GLUCOSE BLDC GLUCOMTR-MCNC: 217 MG/DL (ref 70–105)
TROPONIN T SERPL-MCNC: <0.01 NG/ML (ref 0–0.03)
TROPONIN T SERPL-MCNC: <0.01 NG/ML (ref 0–0.03)

## 2019-12-04 PROCEDURE — 25010000002 SULFUR HEXAFLUORIDE MICROSPH 60.7-25 MG RECONSTITUTED SUSPENSION: Performed by: INTERNAL MEDICINE

## 2019-12-04 PROCEDURE — 72070 X-RAY EXAM THORAC SPINE 2VWS: CPT

## 2019-12-04 PROCEDURE — 82962 GLUCOSE BLOOD TEST: CPT

## 2019-12-04 PROCEDURE — 93306 TTE W/DOPPLER COMPLETE: CPT

## 2019-12-04 PROCEDURE — 99406 BEHAV CHNG SMOKING 3-10 MIN: CPT

## 2019-12-04 PROCEDURE — 94799 UNLISTED PULMONARY SVC/PX: CPT

## 2019-12-04 PROCEDURE — 99203 OFFICE O/P NEW LOW 30 MIN: CPT | Performed by: INTERNAL MEDICINE

## 2019-12-04 PROCEDURE — 25010000002 CEFTRIAXONE PER 250 MG: Performed by: FAMILY MEDICINE

## 2019-12-04 PROCEDURE — 84484 ASSAY OF TROPONIN QUANT: CPT | Performed by: NURSE PRACTITIONER

## 2019-12-04 PROCEDURE — 94664 DEMO&/EVAL PT USE INHALER: CPT

## 2019-12-04 PROCEDURE — 93005 ELECTROCARDIOGRAM TRACING: CPT | Performed by: NURSE PRACTITIONER

## 2019-12-04 PROCEDURE — 84484 ASSAY OF TROPONIN QUANT: CPT | Performed by: FAMILY MEDICINE

## 2019-12-04 PROCEDURE — 25010000002 HYDROMORPHONE PER 4 MG: Performed by: FAMILY MEDICINE

## 2019-12-04 PROCEDURE — 93306 TTE W/DOPPLER COMPLETE: CPT | Performed by: INTERNAL MEDICINE

## 2019-12-04 PROCEDURE — 25010000002 METHYLPREDNISOLONE PER 40 MG: Performed by: FAMILY MEDICINE

## 2019-12-04 PROCEDURE — G0378 HOSPITAL OBSERVATION PER HR: HCPCS

## 2019-12-04 PROCEDURE — 63710000001 INSULIN LISPRO (HUMAN) PER 5 UNITS: Performed by: FAMILY MEDICINE

## 2019-12-04 RX ADMIN — INSULIN LISPRO 4 UNITS: 100 INJECTION, SOLUTION INTRAVENOUS; SUBCUTANEOUS at 18:29

## 2019-12-04 RX ADMIN — METOPROLOL TARTRATE 25 MG: 25 TABLET ORAL at 21:14

## 2019-12-04 RX ADMIN — DIGOXIN 0.25 MG: 250 TABLET ORAL at 08:29

## 2019-12-04 RX ADMIN — HYDROMORPHONE HYDROCHLORIDE 0.5 MG: 2 INJECTION, SOLUTION INTRAMUSCULAR; INTRAVENOUS; SUBCUTANEOUS at 23:09

## 2019-12-04 RX ADMIN — LOSARTAN POTASSIUM 100 MG: 50 TABLET, FILM COATED ORAL at 08:29

## 2019-12-04 RX ADMIN — HYDROMORPHONE HYDROCHLORIDE 0.5 MG: 2 INJECTION, SOLUTION INTRAMUSCULAR; INTRAVENOUS; SUBCUTANEOUS at 18:29

## 2019-12-04 RX ADMIN — CLOPIDOGREL BISULFATE 75 MG: 75 TABLET ORAL at 08:30

## 2019-12-04 RX ADMIN — INSULIN LISPRO 2 UNITS: 100 INJECTION, SOLUTION INTRAVENOUS; SUBCUTANEOUS at 08:30

## 2019-12-04 RX ADMIN — HYDROMORPHONE HYDROCHLORIDE 0.5 MG: 2 INJECTION, SOLUTION INTRAMUSCULAR; INTRAVENOUS; SUBCUTANEOUS at 05:05

## 2019-12-04 RX ADMIN — PREGABALIN 150 MG: 75 CAPSULE ORAL at 21:14

## 2019-12-04 RX ADMIN — IPRATROPIUM BROMIDE AND ALBUTEROL SULFATE 3 ML: .5; 3 SOLUTION RESPIRATORY (INHALATION) at 15:10

## 2019-12-04 RX ADMIN — METHYLPREDNISOLONE SODIUM SUCCINATE 20 MG: 40 INJECTION, POWDER, FOR SOLUTION INTRAMUSCULAR; INTRAVENOUS at 08:30

## 2019-12-04 RX ADMIN — IPRATROPIUM BROMIDE AND ALBUTEROL SULFATE 3 ML: .5; 3 SOLUTION RESPIRATORY (INHALATION) at 11:30

## 2019-12-04 RX ADMIN — PREGABALIN 150 MG: 75 CAPSULE ORAL at 08:29

## 2019-12-04 RX ADMIN — HYDROMORPHONE HYDROCHLORIDE 0.5 MG: 2 INJECTION, SOLUTION INTRAMUSCULAR; INTRAVENOUS; SUBCUTANEOUS at 09:32

## 2019-12-04 RX ADMIN — METOPROLOL TARTRATE 25 MG: 25 TABLET ORAL at 08:30

## 2019-12-04 RX ADMIN — IPRATROPIUM BROMIDE AND ALBUTEROL SULFATE 3 ML: .5; 3 SOLUTION RESPIRATORY (INHALATION) at 06:40

## 2019-12-04 RX ADMIN — IPRATROPIUM BROMIDE AND ALBUTEROL SULFATE 3 ML: .5; 3 SOLUTION RESPIRATORY (INHALATION) at 20:52

## 2019-12-04 RX ADMIN — HYDROMORPHONE HYDROCHLORIDE 0.5 MG: 2 INJECTION, SOLUTION INTRAMUSCULAR; INTRAVENOUS; SUBCUTANEOUS at 01:01

## 2019-12-04 RX ADMIN — HYDROMORPHONE HYDROCHLORIDE 0.5 MG: 2 INJECTION, SOLUTION INTRAMUSCULAR; INTRAVENOUS; SUBCUTANEOUS at 14:12

## 2019-12-04 RX ADMIN — FUROSEMIDE 40 MG: 40 TABLET ORAL at 08:29

## 2019-12-04 RX ADMIN — CEFTRIAXONE SODIUM 1 G: 1 INJECTION, POWDER, FOR SOLUTION INTRAMUSCULAR; INTRAVENOUS at 21:13

## 2019-12-04 RX ADMIN — METHYLPREDNISOLONE SODIUM SUCCINATE 20 MG: 40 INJECTION, POWDER, FOR SOLUTION INTRAMUSCULAR; INTRAVENOUS at 21:14

## 2019-12-04 RX ADMIN — SULFUR HEXAFLUORIDE 2 ML: KIT at 15:00

## 2019-12-04 RX ADMIN — DILTIAZEM HYDROCHLORIDE 120 MG: 120 CAPSULE, COATED, EXTENDED RELEASE ORAL at 08:29

## 2019-12-04 RX ADMIN — INSULIN LISPRO 2 UNITS: 100 INJECTION, SOLUTION INTRAVENOUS; SUBCUTANEOUS at 12:45

## 2019-12-04 NOTE — CONSULTS
[facility] CONSULT  Joshua Yip MD, PhD primary encounter provider      Subjective:     Encounter Date:12/04/2019      Patient ID: Ro Nathan is a 74 y.o. male.    Chief Complaint: chest pain / SOA    Referring Physician: Dr. Jones    HPI:  Ro Nathan is a 74 y.o. male who presents from PCP office with complaints of worsening SOA, cough, pain between shoulder blades and chest pain.  Patient sees a cardiologist at [facility] but he can not recall who it is.  He is interested in switching his care today to our service.  With face-to-face encounter today and reviewed his past medical history which includes CAD s/p prior PCI with stent to LAD several years ago, reportedly normal stress test within last 3mo at [place], records were requested and stress test was unremarkable.  Echo here reviewed LVEF greater than 70% with severe left atrial enlargement with underlying atrial fibrillation and ventricular pacing, he has chronic Afib s/p AV node ablation and PPM placement, COPD, CKD, ARTI, HTN, HLD.  Patient states he has been in his usual state of health until a week ago when he noticed increasing SOA and developed pain in between his shoulder blades, he associated this pain with coughing episodes.  Notably he has also had chest pain rating to bilateral shoulders with exertion over the last few weeks to months increasing in frequency and severity.  This prompted his stress test 3 months ago.  Yesterday patient described a band like pain across his chest lasting seconds to minutes in duration 6 out of 10 in severity..  He saw his PCP and was admitted for AE COPD as well as CP.  CE are negative, ECG reveals V paced rhythm, underlying afib/flutter (of note pt is not on a/c as outpt).  Patient is currently CP free, cardiology has been asked to evaluate.  Currently on my encounter he is not actively wheezing and he is on room air on IV antibiotics for community-acquired pneumonia  and COPD exacerbation acutely.  I discussed all risks and benefits for invasive evaluation given his recurrent chest pain syndromes despite uninformative stress test at the outside hospital with known history of coronary artery disease and typical exertional symptoms.  He states he would like to undergo invasive evaluation whenever possible prior to his discharge to get everything done while he is here.  Again he wants to switch his care to our cardiovascular service.    Review of systems is unremarkable and negative x14 point review of systems except was mentioned above with chest pain /shortness of air      Past Medical History:   Diagnosis Date   • Bruises easily    • CHF (congestive heart failure) (CMS/HCC)    • Congenital heart defect    • Difficulty attaining erection    • Hypertension    • Low back pain    • Poor circulation    • Prostate cancer (CMS/HCC)    • Shortness of breath          Past Surgical History:   Procedure Laterality Date   • APPENDECTOMY     • CHOLECYSTECTOMY     • ELBOW PROCEDURE      left   • HERNIA REPAIR           Social History     Socioeconomic History   • Marital status:      Spouse name: Not on file   • Number of children: Not on file   • Years of education: Not on file   • Highest education level: Not on file   Tobacco Use   • Smoking status: Former Smoker   • Smokeless tobacco: Never Used   Substance and Sexual Activity   • Alcohol use: Yes     Frequency: Never     Comment: socially    • Drug use: No   • Sexual activity: Defer         Allergies   Allergen Reactions   • Haloperidol Other (See Comments)   • Morphine Itching   • Pantoprazole Other (See Comments)     Feels sick after receiving       Current Medications:   Scheduled Meds:  cefTRIAXone 1 g Intravenous Q24H   clopidogrel 75 mg Oral Daily   digoxin 0.25 mg Oral Daily   dilTIAZem  mg Oral Daily   furosemide 40 mg Oral Daily   insulin lispro 0-9 Units Subcutaneous 4x Daily With Meals & Nightly  "  ipratropium-albuterol 3 mL Nebulization 4x Daily - RT   losartan 100 mg Oral Q24H   methylPREDNISolone sodium succinate 20 mg Intravenous Q12H   metoprolol tartrate 25 mg Oral Q12H   pregabalin 150 mg Oral Q12H     Continuous Infusions:  sodium chloride 50 mL/hr Last Rate: 50 mL/hr (12/03/19 2054)       Review of Systems   Cardiovascular: Positive for chest pain.   Respiratory: Positive for cough, shortness of breath and wheezing.    Musculoskeletal:        Pain between shoulder blades   Gastrointestinal: Negative.    Genitourinary: Negative.    Neurological: Negative.           Objective:         /89 (BP Location: Right arm)   Pulse 69   Temp 97.6 °F (36.4 °C)   Resp 16   Ht 185.4 cm (73\")   Wt 113 kg (249 lb 8 oz)   SpO2 97%   BMI 32.92 kg/m²       General Appearance:    Alert, cooperative, in no acute distress, alert and oriented x3, afebrile vital signs stable           Throat:   No oral lesions, no thrush, oral mucosa moist   Neck:   supple, trachea midline, no thyromegaly, no carotid bruit, no JVD   Lungs:     Clear to auscultation,respirations regular, even and                  unlabored    Heart:    Regular rhythm and normal rate, normal S1 and S2, no   significant  murmur, no gallop, no rub, no click   Abdomen:     Normal bowel sounds, no masses, no organomegaly, soft        non-tender, non-distended, no guarding, no rebound                tenderness, abdominal scar from previous hernia surgery, no caput   Extremities:   Moves all extremities well, no edema, no cyanosis, no             redness, femoral pulses 2+ bilaterally and equal, no peripheral edema noted   Pulses:   Pulses palpable and equal bilaterally, normal cap refill as well   Skin:   No bleeding, bruising or rash, warm and dry   Neurologic:   Awake, alert, oriented x3, no focal neurologic deficit grossly             ASCVD RIsk Score::  The ASCVD Risk score ([place] ERNIE Jr., et al., 2013) failed to calculate for the following " reasons:    Cannot find a previous HDL lab    Cannot find a previous total cholesterol lab      Lab Review:     Results from last 7 days   Lab Units 12/03/19  1842   SODIUM mmol/L 139   POTASSIUM mmol/L 4.4   CHLORIDE mmol/L 103   CO2 mmol/L 26.0   BUN mg/dL 27*   CREATININE mg/dL 1.29*   GLUCOSE mg/dL 116*   CALCIUM mg/dL 8.8   AST (SGOT) U/L 17   ALT (SGPT) U/L 10     Results from last 7 days   Lab Units 12/04/19  0930 12/04/19  0516 12/03/19  1842   TROPONIN T ng/mL <0.010 <0.010 <0.010     Results from last 7 days   Lab Units 12/03/19  1843   WBC 10*3/mm3 8.20   HEMOGLOBIN g/dL 15.2   HEMATOCRIT % 42.5   PLATELETS 10*3/mm3 301                   Invalid input(s): LDLCALC  Results from last 7 days   Lab Units 12/03/19  1842   PROBNP pg/mL 445.9           Recent Radiology:  Imaging Results (Most Recent)     Procedure Component Value Units Date/Time    XR Spine Thoracic 2 View [[id number]] Resulted:  12/04/19 1150     Updated:  12/04/19 1147    XR Chest PA & Lateral [[id number]] Collected:  12/03/19 2232     Updated:  12/03/19 2235    Narrative:       XR CHEST PA AND LATERAL-     Date of Exam: 12/3/2019 8:12 PM     Indication: SOB  74-year-old male     Comparison: 05/10/2018, 08/08/2016, 07/28/2015     Technique: 2 view(s) of the chest were obtained.     FINDINGS: The lungs are well expanded. Cardiac, hilar and  mediastinal silhouettes are stable with a dual lead pacemaker in place.  No significant pneumothorax, pleural effusion or focal pulmonary  parenchymal opacity. Pulmonary vascularity is within normal limits. The  trachea is midline. Visualized bony structures are intact.       Impression:       No active cardiopulmonary disease.        Electronically Signed By-Aly Euceda DO. On:12/3/2019 10:33 PM  This report was finalized on [id number] by  Aly Euceda DO..            ECHOCARDIOGRAM:                        Assessment and plan per my encounter:       New patient to me today  Active Hospital Problems     Diagnosis POA   • COPD exacerbation (CMS/[facility]) [J44.1] Yes     1. Chest Pain, new problem, history of known CAD with LAD stenting remotely, high pretest probability  - CE negative, ruled out for acute MI presently  -reported normal stress within last 3 mo-records also demonstrate unremarkable study  - continue Plavix, pt is not on ASA, he is not on statin therapy, he does not know why he is not on aspirin    -2D echo reviewed by me with all images demonstrate normal LV systolic function 60 to 70% with no regional wall motion abnormality, severe left atrial large mid in setting of A. fib, no significant valvular abnormality  - ECG v paced, EKG uninterpretable for ischemia  Chest pain-free  Elective left heart catheterization and coronary angiography for definitive evaluation given ongoing anginal symptoms  N.p.o. at midnight  Tentatively scheduled for tomorrow at patient request, not actively wheezing    2. SOA / AE COPD  - Ddimer negative  - Tx of AE COPD per primary  Not actively wheezing  Antibiotics okay, per primary    3.  H/o afib s/p AV node ablation with PPM in place  - rate controlled, ECG with underlying afib/ flutter  - he is not on a/c as outpt, CHADS VAsc at least 2 (age, HTN)  - on digoxin, cardizem, metop  - interrogate device  Not on anticoagulation with significant GI bleeding on Coumadin previously in the past    4. H/o CAD  - s/p prior LAD PCI   - on Plavix, not on statin therapy, no ASA, reported normal stress 3 mo ago,   Chest pain syndrome suspicious for angina, elective left heart catheterization with angiography were different for definitive evaluation at patient request    5. HTN  - on metop, losartan, cardizem, Hctz, Lasix  Goal blood pressure less than 140 systolic  Titrate medicines for goals as inpatient and outpatient, avoid hypotension    6. HLD  - not on statin therapy, unknown reason, will check FLP    7. CKD  - crt 1.2, stable        Joshua Yip MD, PhD  12/04/19

## 2019-12-04 NOTE — PROGRESS NOTES
LOS: 1 day   Patient Care Team:  Yusuf Jones MD as PCP - General    Subjective:  Continued pain and shortness of breath with anginal equivalent    Objective:   Afebrile    Review of Systems:   Review of Systems   Constitutional: Positive for activity change and fatigue.   HENT: Negative.    Respiratory: Positive for chest tightness and shortness of breath.    Cardiovascular: Positive for chest pain and palpitations.   Gastrointestinal: Positive for abdominal distention.   Genitourinary: Negative for dysuria.   Musculoskeletal: Positive for arthralgias and back pain.   Neurological: Positive for weakness.   Psychiatric/Behavioral: Negative.            Vital Signs  Temp:  [97.4 °F (36.3 °C)-97.6 °F (36.4 °C)] 97.6 °F (36.4 °C)  Heart Rate:  [70-79] 71  Resp:  [15-20] 18  BP: (155-163)/(80-89) 163/89    Physical Exam:  Physical Exam   Constitutional: He is oriented to person, place, and time. He appears well-developed and well-nourished.   HENT:   Head: Normocephalic and atraumatic.   Eyes: EOM are normal. Pupils are equal, round, and reactive to light.   Cardiovascular: Normal heart sounds.   Pulmonary/Chest: Effort normal. No stridor. No respiratory distress. He has wheezes. He has no rales.   Abdominal: Soft.   Neurological: He is alert and oriented to person, place, and time.   Nursing note and vitals reviewed.       Radiology:  Xr Chest Pa & Lateral    Result Date: 12/3/2019  No active cardiopulmonary disease.   Electronically Signed By-Aly Euceda DO. On:12/3/2019 10:33 PM This report was finalized on [id number] by  Aly Euceda DO..         Results Review:     I reviewed the patient's new clinical results.  I reviewed the patient's new imaging results and agree with the interpretation.    Medication Review:   Scheduled Meds:  cefTRIAXone 1 g Intravenous Q24H   clopidogrel 75 mg Oral Daily   digoxin 0.25 mg Oral Daily   dilTIAZem  mg Oral Daily   furosemide 40 mg Oral Daily   insulin  lispro 0-9 Units Subcutaneous 4x Daily With Meals & Nightly   ipratropium-albuterol 3 mL Nebulization 4x Daily - RT   losartan 100 mg Oral Q24H   methylPREDNISolone sodium succinate 20 mg Intravenous Q12H   metoprolol tartrate 25 mg Oral Q12H   pregabalin 150 mg Oral Q12H     Continuous Infusions:  sodium chloride 50 mL/hr Last Rate: 50 mL/hr (12/03/19 2054)     PRN Meds:.dextrose  •  dextrose  •  glucagon (human recombinant)  •  HYDROmorphone  •  insulin lispro **AND** insulin lispro  •  oxyCODONE    Labs:    CBC    Results from last 7 days   Lab Units 12/03/19  1843   WBC 10*3/mm3 8.20   HEMOGLOBIN g/dL 15.2   PLATELETS 10*3/mm3 301     BMP Results from last 7 days   Lab Units 12/03/19  1842   SODIUM mmol/L 139   POTASSIUM mmol/L 4.4   CHLORIDE mmol/L 103   CO2 mmol/L 26.0   BUN mg/dL 27*   CREATININE mg/dL 1.29*   GLUCOSE mg/dL 116*     Cr Clearance Estimated Creatinine Clearance: 67.2 mL/min (A) (by C-G formula based on SCr of 1.29 mg/dL (H)).  Coag     HbA1C No results found for: HGBA1C  Blood Glucose   Glucose   Date/Time Value Ref Range Status   12/04/2019 0711 178 (H) 70 - 105 mg/dL Final     Comment:     Serial Number: [id number]:  [id number]   12/03/2019 1907 137 (H) 70 - 105 mg/dL Final     Comment:     Serial Number: [id number]:  [id number]     Infection     CMP Results from last 7 days   Lab Units 12/03/19  1842   SODIUM mmol/L 139   POTASSIUM mmol/L 4.4   CHLORIDE mmol/L 103   CO2 mmol/L 26.0   BUN mg/dL 27*   CREATININE mg/dL 1.29*   GLUCOSE mg/dL 116*   ALBUMIN g/dL 3.90   BILIRUBIN mg/dL 0.5   ALK PHOS U/L 124*   AST (SGOT) U/L 17   ALT (SGPT) U/L 10     UA      Radiology(recent) Xr Chest Pa & Lateral    Result Date: 12/3/2019  No active cardiopulmonary disease.   Electronically Signed By-Aly Euceda DO. On:12/3/2019 10:33 PM This report was finalized on [id number] by  Aly Euceda DO..     Assessment:  Acute exacerbation of panlobular COPD with emphysema  Acute on chronic  mucopurulent bronchitis  Acute thoracic back pain  Substernal chest pain//anginal equivalent  Atherosclerotic heart disease of native coronary arteries with angina pectoris  Exogenous obesity  History of alcoholism  Nicotine dependency with nicotine use disorder  Myofascial pain syndrome  Chronic low back pain  Degenerative disc disease lumbosacral spine  Atrial fibrillation  History of pacemaker placement  Primary essential hypertension  Dyslipidemia associated with type 2 diabetes  History of percutaneous coronary intervention with stent placement  History of prostate cancer  Peripheral vascular disease  Chronic systolic congestive heart failure  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

## 2019-12-04 NOTE — PROGRESS NOTES
[id number] [place] [address]    Pulmonary Disease Educator  Discharge Planning    Patient Name: Ro Nathan  : 1945  Room #: 375/1  Pulmonologist: none  Admit Diagnosis: COPD  Current Admission Date: 12/3/2019   Previous Admit: COPD  Previous Admission Date: 2018  Body mass index is 32.92 kg/m².      Ro Nathan is a former smoker who quit in 2019     PFT’s in the last year? no      Room Air O2 Sat: 95  Current O2: none  Home O2: no  Home BiPap/CPAP: no  ABG: No results found for: SITE, ALLENTEST, PHART, EYK9UIO, PO2ART, YPX7CTT, BASEEXCESS, A3TJGQPA, HGBBG, HCTABG, OXYHEMOGLOBI, METHHGBN, CARBOXYHGB, CO2CT, BAROMETRIC, MODALITY, FIO2      Current Home Medications:  Prior to Admission medications    Medication Sig Start Date End Date Taking? Authorizing Provider   clopidogrel (PLAVIX) 75 MG tablet Take 75 mg by mouth Daily. 19  Yes Valentín Medina MD   digoxin (LANOXIN) 250 MCG tablet Take 0.25 mg by mouth Daily.   Yes Valentín Medina MD   diltiaZEM CD (CARDIZEM CD) 120 MG 24 hr capsule Take 120 mg by mouth Daily.   Yes Valentín Medina MD   furosemide (LASIX) 40 MG tablet Take 40 mg by mouth.   Yes ProviderValentín MD   hydrochlorothiazide (HYDRODIURIL) 25 MG tablet  7/10/19  Yes Valentín Medina MD   losartan (COZAAR) 100 MG tablet  19  Yes Valentín Medina MD   LYRICA 150 MG capsule 150 mg 2 (Two) Times a Day. 19  Yes Valentín Medina MD   mirtazapine (REMERON) 30 MG tablet  19  Yes Valentín Medina MD   oxyCODONE-acetaminophen (PERCOCET)  MG per tablet Take 1 tablet by mouth Every 6 (Six) Hours As Needed for Moderate Pain . 10/17/19  Yes Herberth Oconnor MD   temazepam (RESTORIL) 30 MG capsule Take 30 mg by mouth.   Yes Valentín Medina MD   traZODone (DESYREL) 100 MG tablet  19  Yes Valentín Medina MD   VENTOLIN  (90 Base) MCG/ACT inhaler As Needed. 19  Yes  Valentín Medina MD   metoprolol tartrate (LOPRESSOR) 25 MG tablet Take 25 mg by mouth.    Valenítn Medina MD   metoprolol tartrate (LOPRESSOR) 50 MG tablet  9/3/19   Valentín Medina MD   POTASSIUM CHLORIDE PO Take  by mouth 2 (Two) Times a Day.    Valentín Medina MD   vitamin D (ERGOCALCIFEROL) 14346 units capsule capsule Take 50,000 Units by mouth.    Valentín Medina MD       Recommendations/Testing:  Ro Nathan was seen by the pulmonary disease educator for evaluation of care gaps according to the COPD GOLD Guidelines.  After evaluation the patient's cardiopulmonary status, it is the recommendation of the care coordinator that the patient receive the following testing, equipment, or consults.    PFT    Reconciled RT Home Medications:  After evaluation the patient's cardiopulmonary status, it is the recommendation of the care coordinator that the patient receive the following medication changes or additions.    No changes at this timePrior COPD Education?   yes  Prior Pulmonary Rehab?   Yes   History of COPD admission in the past year?    no         Other Notes: Pt was interested in ME but did not want to start exercises today. States he is having back pain and is on shots Q4 at this time. Pt states he quit smoking roughly 4 months ago and was using Chantix. States he has stopped using Chantix and is doing fairly well. Spoke to pt about other NRTs and he stated he was not interested at this time but would keep them in mind. Pt notes state he has Panlobular Emphysema - no PFT located. Recommending a PFT to DX/stage COPD. Pt does state that he usually does pretty well with his breathing except in the winter months. Did work with pt on PLB and spoke about other triggers.    Insurance: [employer]icare             EDUCATION DONE WITH PATIENT:     IMT:  Smoking Cessation        COPD          Pursed Lip Breathing          STOP BANG (=/> 3 is HIGH RISK):   Do you snore  loudly? no                 Tired/Fatigued during the day? no  Stop Breathing in sleeping? no  High B/P or treated B/P? yes  BMI greater than 35?   No  Body mass index is 32.92 kg/m².  More than 50 years old?  yes 74 y.o.  Neck Circumference > 40cm    no  Male? yes male       TOTAL 3    Has patient seen a sleep Dr.? (Who/When) no  Sleep Study Done? (When) no   Would you like a sleep tech to come talk to you about ARTI? no

## 2019-12-04 NOTE — PROGRESS NOTES
Discharge Planning Assessment   [facility]     Patient Name: Ro Nathan  MRN: [id number]  Today's Date: 12/4/2019    Admit Date: 12/3/2019    Discharge Needs Assessment     Row Name 12/04/19 1559       Living Environment    Lives With  spouse    Current Living Arrangements  home/apartment/condo    Primary Care Provided by  self    Provides Primary Care For  no one    Family Caregiver if Needed  spouse    Quality of Family Relationships  helpful;involved    Able to Return to Prior Arrangements  yes       Resource/Environmental Concerns    Resource/Environmental Concerns  none    Transportation Concerns  car, none       Transition Planning    Patient/Family Anticipates Transition to  home with family    Patient/Family Anticipated Services at Transition  none    Transportation Anticipated  family or friend will provide       Discharge Needs Assessment    Readmission Within the Last 30 Days  no previous admission in last 30 days    Concerns to be Addressed  denies needs/concerns at this time;no discharge needs identified    Equipment Currently Used at Home  nebulizer    Anticipated Changes Related to Illness  none    Equipment Needed After Discharge  none        Discharge Plan     Row Name 12/04/19 1600       Plan    Plan  Anticipate routine home     Patient/Family in Agreement with Plan  yes    Plan Comments  Met with patient at bedside. He lives at home with spouse. IADLs. Still drives. PCP Dr. Jones. No issues affording medications. Denies any discharge needs or concerns at this time.           Expected Discharge Date and Time     Expected Discharge Date Expected Discharge Time    Dec 5, 2019         Demographic Summary     Row Name 12/04/19 1559       General Information    Admission Type  observation    Arrived From  emergency department    Referral Source  admission list    Reason for Consult  discharge planning    Preferred Language  English     Used During This Interaction  no        Functional  Status     Row Name 12/04/19 1559       Functional Status    Usual Activity Tolerance  good    Current Activity Tolerance  good       Functional Status, IADL    Medications  independent    Meal Preparation  independent    Housekeeping  independent    Laundry  independent    Shopping  independent       Mental Status    General Appearance WDL  WDL       Mental Status Summary    Recent Changes in Mental Status/Cognitive Functioning  no changes          Patient Forms     Row Name 12/04/19 1601       Patient Forms    Important Message from Medicare ([facility])  -- IM delivered 12/3             Manju Meyer RN

## 2019-12-05 PROBLEM — I20.0 UNSTABLE ANGINA (HCC): Status: ACTIVE | Noted: 2019-12-03

## 2019-12-05 LAB
ACT BLD: 279 SECONDS (ref 89–137)
ACT BLD: 340 SECONDS (ref 89–137)
ANION GAP SERPL CALCULATED.3IONS-SCNC: 11 MMOL/L (ref 5–15)
APTT PPP: 25 SECONDS (ref 24–31)
BUN BLD-MCNC: 27 MG/DL (ref 8–23)
BUN/CREAT SERPL: 27.8 (ref 7–25)
CALCIUM SPEC-SCNC: 9.2 MG/DL (ref 8.6–10.5)
CHLORIDE SERPL-SCNC: 101 MMOL/L (ref 98–107)
CHOLEST SERPL-MCNC: 160 MG/DL (ref 0–200)
CO2 SERPL-SCNC: 25 MMOL/L (ref 22–29)
CREAT BLD-MCNC: 0.97 MG/DL (ref 0.76–1.27)
DEPRECATED RDW RBC AUTO: 48.1 FL (ref 37–54)
ERYTHROCYTE [DISTWIDTH] IN BLOOD BY AUTOMATED COUNT: 15.1 % (ref 12.3–15.4)
GFR SERPL CREATININE-BSD FRML MDRD: 76 ML/MIN/1.73
GLUCOSE BLD-MCNC: 193 MG/DL (ref 65–99)
GLUCOSE BLDC GLUCOMTR-MCNC: 144 MG/DL (ref 70–105)
GLUCOSE BLDC GLUCOMTR-MCNC: 190 MG/DL (ref 70–105)
GLUCOSE BLDC GLUCOMTR-MCNC: 271 MG/DL (ref 70–105)
HCT VFR BLD AUTO: 45.1 % (ref 37.5–51)
HDLC SERPL-MCNC: 48 MG/DL (ref 40–60)
HGB BLD-MCNC: 15.7 G/DL (ref 13–17.7)
INR PPP: 1.07 (ref 0.9–1.1)
LDLC SERPL CALC-MCNC: 100 MG/DL (ref 0–100)
LDLC/HDLC SERPL: 2.09 {RATIO}
MCH RBC QN AUTO: 32 PG (ref 26.6–33)
MCHC RBC AUTO-ENTMCNC: 34.8 G/DL (ref 31.5–35.7)
MCV RBC AUTO: 91.9 FL (ref 79–97)
PLATELET # BLD AUTO: 333 10*3/MM3 (ref 140–450)
PMV BLD AUTO: 8.2 FL (ref 6–12)
POTASSIUM BLD-SCNC: 4.3 MMOL/L (ref 3.5–5.2)
PROTHROMBIN TIME: 11.1 SECONDS (ref 9.6–11.7)
RBC # BLD AUTO: 4.91 10*6/MM3 (ref 4.14–5.8)
SODIUM BLD-SCNC: 137 MMOL/L (ref 136–145)
TRIGL SERPL-MCNC: 59 MG/DL (ref 0–150)
VLDLC SERPL-MCNC: 11.8 MG/DL
WBC NRBC COR # BLD: 11.8 10*3/MM3 (ref 3.4–10.8)

## 2019-12-05 PROCEDURE — 85347 COAGULATION TIME ACTIVATED: CPT

## 2019-12-05 PROCEDURE — 25010000002 CEFTRIAXONE PER 250 MG: Performed by: FAMILY MEDICINE

## 2019-12-05 PROCEDURE — 80048 BASIC METABOLIC PNL TOTAL CA: CPT | Performed by: FAMILY MEDICINE

## 2019-12-05 PROCEDURE — 25010000002 HEPARIN (PORCINE) PER 1000 UNITS: Performed by: INTERNAL MEDICINE

## 2019-12-05 PROCEDURE — 027034Z DILATION OF CORONARY ARTERY, ONE ARTERY WITH DRUG-ELUTING INTRALUMINAL DEVICE, PERCUTANEOUS APPROACH: ICD-10-PCS | Performed by: INTERNAL MEDICINE

## 2019-12-05 PROCEDURE — B2111ZZ FLUOROSCOPY OF MULTIPLE CORONARY ARTERIES USING LOW OSMOLAR CONTRAST: ICD-10-PCS | Performed by: INTERNAL MEDICINE

## 2019-12-05 PROCEDURE — 63710000001 INSULIN LISPRO (HUMAN) PER 5 UNITS: Performed by: FAMILY MEDICINE

## 2019-12-05 PROCEDURE — C1769 GUIDE WIRE: HCPCS | Performed by: INTERNAL MEDICINE

## 2019-12-05 PROCEDURE — 99153 MOD SED SAME PHYS/QHP EA: CPT | Performed by: INTERNAL MEDICINE

## 2019-12-05 PROCEDURE — 85730 THROMBOPLASTIN TIME PARTIAL: CPT | Performed by: INTERNAL MEDICINE

## 2019-12-05 PROCEDURE — 85027 COMPLETE CBC AUTOMATED: CPT | Performed by: INTERNAL MEDICINE

## 2019-12-05 PROCEDURE — 92928 PRQ TCAT PLMT NTRAC ST 1 LES: CPT | Performed by: INTERNAL MEDICINE

## 2019-12-05 PROCEDURE — C9600 PERC DRUG-EL COR STENT SING: HCPCS | Performed by: INTERNAL MEDICINE

## 2019-12-05 PROCEDURE — 99152 MOD SED SAME PHYS/QHP 5/>YRS: CPT | Performed by: INTERNAL MEDICINE

## 2019-12-05 PROCEDURE — C1725 CATH, TRANSLUMIN NON-LASER: HCPCS | Performed by: INTERNAL MEDICINE

## 2019-12-05 PROCEDURE — B240ZZ3 ULTRASONOGRAPHY OF SINGLE CORONARY ARTERY, INTRAVASCULAR: ICD-10-PCS | Performed by: INTERNAL MEDICINE

## 2019-12-05 PROCEDURE — 92978 ENDOLUMINL IVUS OCT C 1ST: CPT | Performed by: INTERNAL MEDICINE

## 2019-12-05 PROCEDURE — 25010000002 HYDROMORPHONE PER 4 MG: Performed by: FAMILY MEDICINE

## 2019-12-05 PROCEDURE — 85610 PROTHROMBIN TIME: CPT | Performed by: INTERNAL MEDICINE

## 2019-12-05 PROCEDURE — C1753 CATH, INTRAVAS ULTRASOUND: HCPCS | Performed by: INTERNAL MEDICINE

## 2019-12-05 PROCEDURE — 82962 GLUCOSE BLOOD TEST: CPT

## 2019-12-05 PROCEDURE — 25010000002 FENTANYL CITRATE (PF) 100 MCG/2ML SOLUTION: Performed by: INTERNAL MEDICINE

## 2019-12-05 PROCEDURE — 93458 L HRT ARTERY/VENTRICLE ANGIO: CPT | Performed by: INTERNAL MEDICINE

## 2019-12-05 PROCEDURE — C1760 CLOSURE DEV, VASC: HCPCS | Performed by: INTERNAL MEDICINE

## 2019-12-05 PROCEDURE — 99233 SBSQ HOSP IP/OBS HIGH 50: CPT | Performed by: INTERNAL MEDICINE

## 2019-12-05 PROCEDURE — 0 IOPAMIDOL PER 1 ML: Performed by: INTERNAL MEDICINE

## 2019-12-05 PROCEDURE — C1874 STENT, COATED/COV W/DEL SYS: HCPCS | Performed by: INTERNAL MEDICINE

## 2019-12-05 PROCEDURE — 80061 LIPID PANEL: CPT | Performed by: NURSE PRACTITIONER

## 2019-12-05 PROCEDURE — 25010000002 METHYLPREDNISOLONE PER 40 MG: Performed by: FAMILY MEDICINE

## 2019-12-05 PROCEDURE — C1887 CATHETER, GUIDING: HCPCS | Performed by: INTERNAL MEDICINE

## 2019-12-05 PROCEDURE — 94799 UNLISTED PULMONARY SVC/PX: CPT

## 2019-12-05 PROCEDURE — 25010000002 MIDAZOLAM PER 1 MG: Performed by: INTERNAL MEDICINE

## 2019-12-05 PROCEDURE — C1894 INTRO/SHEATH, NON-LASER: HCPCS | Performed by: INTERNAL MEDICINE

## 2019-12-05 PROCEDURE — B2151ZZ FLUOROSCOPY OF LEFT HEART USING LOW OSMOLAR CONTRAST: ICD-10-PCS | Performed by: INTERNAL MEDICINE

## 2019-12-05 PROCEDURE — 4A023N7 MEASUREMENT OF CARDIAC SAMPLING AND PRESSURE, LEFT HEART, PERCUTANEOUS APPROACH: ICD-10-PCS | Performed by: INTERNAL MEDICINE

## 2019-12-05 DEVICE — XIENCE SIERRA™ EVEROLIMUS ELUTING CORONARY STENT SYSTEM 3.50 MM X 33 MM / RAPID-EXCHANGE
Type: IMPLANTABLE DEVICE | Status: FUNCTIONAL
Brand: XIENCE SIERRA™

## 2019-12-05 RX ORDER — ASPIRIN 325 MG
TABLET ORAL AS NEEDED
Status: DISCONTINUED | OUTPATIENT
Start: 2019-12-05 | End: 2019-12-05 | Stop reason: HOSPADM

## 2019-12-05 RX ORDER — FENTANYL CITRATE 50 UG/ML
INJECTION, SOLUTION INTRAMUSCULAR; INTRAVENOUS AS NEEDED
Status: DISCONTINUED | OUTPATIENT
Start: 2019-12-05 | End: 2019-12-05 | Stop reason: HOSPADM

## 2019-12-05 RX ORDER — MIDAZOLAM HYDROCHLORIDE 1 MG/ML
INJECTION INTRAMUSCULAR; INTRAVENOUS AS NEEDED
Status: DISCONTINUED | OUTPATIENT
Start: 2019-12-05 | End: 2019-12-05 | Stop reason: HOSPADM

## 2019-12-05 RX ORDER — LIDOCAINE HYDROCHLORIDE 20 MG/ML
INJECTION, SOLUTION INFILTRATION; PERINEURAL AS NEEDED
Status: DISCONTINUED | OUTPATIENT
Start: 2019-12-05 | End: 2019-12-05 | Stop reason: HOSPADM

## 2019-12-05 RX ORDER — NITROGLYCERIN 5 MG/ML
INJECTION, SOLUTION INTRAVENOUS AS NEEDED
Status: DISCONTINUED | OUTPATIENT
Start: 2019-12-05 | End: 2019-12-05 | Stop reason: HOSPADM

## 2019-12-05 RX ORDER — SODIUM CHLORIDE 9 MG/ML
100 INJECTION, SOLUTION INTRAVENOUS CONTINUOUS
Status: DISCONTINUED | OUTPATIENT
Start: 2019-12-05 | End: 2019-12-06 | Stop reason: HOSPADM

## 2019-12-05 RX ORDER — ATORVASTATIN CALCIUM 40 MG/1
40 TABLET, FILM COATED ORAL NIGHTLY
Status: DISCONTINUED | OUTPATIENT
Start: 2019-12-05 | End: 2019-12-06 | Stop reason: HOSPADM

## 2019-12-05 RX ORDER — ASPIRIN 81 MG/1
81 TABLET ORAL DAILY
Status: DISCONTINUED | OUTPATIENT
Start: 2019-12-05 | End: 2019-12-06 | Stop reason: HOSPADM

## 2019-12-05 RX ORDER — HEPARIN SODIUM 1000 [USP'U]/ML
INJECTION, SOLUTION INTRAVENOUS; SUBCUTANEOUS AS NEEDED
Status: DISCONTINUED | OUTPATIENT
Start: 2019-12-05 | End: 2019-12-05 | Stop reason: HOSPADM

## 2019-12-05 RX ADMIN — INSULIN LISPRO 2 UNITS: 100 INJECTION, SOLUTION INTRAVENOUS; SUBCUTANEOUS at 10:50

## 2019-12-05 RX ADMIN — CEFTRIAXONE SODIUM 1 G: 1 INJECTION, POWDER, FOR SOLUTION INTRAMUSCULAR; INTRAVENOUS at 21:03

## 2019-12-05 RX ADMIN — IPRATROPIUM BROMIDE AND ALBUTEROL SULFATE 3 ML: .5; 3 SOLUTION RESPIRATORY (INHALATION) at 20:22

## 2019-12-05 RX ADMIN — IPRATROPIUM BROMIDE AND ALBUTEROL SULFATE 3 ML: .5; 3 SOLUTION RESPIRATORY (INHALATION) at 15:41

## 2019-12-05 RX ADMIN — HYDROMORPHONE HYDROCHLORIDE 0.5 MG: 2 INJECTION, SOLUTION INTRAMUSCULAR; INTRAVENOUS; SUBCUTANEOUS at 07:15

## 2019-12-05 RX ADMIN — METOPROLOL TARTRATE 25 MG: 25 TABLET ORAL at 21:02

## 2019-12-05 RX ADMIN — ASPIRIN 81 MG: 81 TABLET, DELAYED RELEASE ORAL at 10:47

## 2019-12-05 RX ADMIN — PREGABALIN 150 MG: 75 CAPSULE ORAL at 10:47

## 2019-12-05 RX ADMIN — METHYLPREDNISOLONE SODIUM SUCCINATE 20 MG: 40 INJECTION, POWDER, FOR SOLUTION INTRAMUSCULAR; INTRAVENOUS at 10:48

## 2019-12-05 RX ADMIN — HYDROMORPHONE HYDROCHLORIDE 0.5 MG: 2 INJECTION, SOLUTION INTRAMUSCULAR; INTRAVENOUS; SUBCUTANEOUS at 15:27

## 2019-12-05 RX ADMIN — OXYCODONE HYDROCHLORIDE 5 MG: 5 TABLET ORAL at 01:28

## 2019-12-05 RX ADMIN — METHYLPREDNISOLONE SODIUM SUCCINATE 20 MG: 40 INJECTION, POWDER, FOR SOLUTION INTRAMUSCULAR; INTRAVENOUS at 21:02

## 2019-12-05 RX ADMIN — HYDROMORPHONE HYDROCHLORIDE 0.5 MG: 2 INJECTION, SOLUTION INTRAMUSCULAR; INTRAVENOUS; SUBCUTANEOUS at 19:31

## 2019-12-05 RX ADMIN — SODIUM CHLORIDE 50 ML/HR: 900 INJECTION, SOLUTION INTRAVENOUS at 01:25

## 2019-12-05 RX ADMIN — IPRATROPIUM BROMIDE AND ALBUTEROL SULFATE 3 ML: .5; 3 SOLUTION RESPIRATORY (INHALATION) at 08:11

## 2019-12-05 RX ADMIN — ATORVASTATIN CALCIUM 40 MG: 40 TABLET, FILM COATED ORAL at 21:02

## 2019-12-05 RX ADMIN — HYDROMORPHONE HYDROCHLORIDE 0.5 MG: 2 INJECTION, SOLUTION INTRAMUSCULAR; INTRAVENOUS; SUBCUTANEOUS at 23:42

## 2019-12-05 RX ADMIN — INSULIN LISPRO 6 UNITS: 100 INJECTION, SOLUTION INTRAVENOUS; SUBCUTANEOUS at 21:15

## 2019-12-05 RX ADMIN — IPRATROPIUM BROMIDE AND ALBUTEROL SULFATE 3 ML: .5; 3 SOLUTION RESPIRATORY (INHALATION) at 11:39

## 2019-12-05 RX ADMIN — HYDROMORPHONE HYDROCHLORIDE 0.5 MG: 2 INJECTION, SOLUTION INTRAMUSCULAR; INTRAVENOUS; SUBCUTANEOUS at 03:10

## 2019-12-05 RX ADMIN — PREGABALIN 150 MG: 75 CAPSULE ORAL at 21:02

## 2019-12-05 RX ADMIN — CLOPIDOGREL BISULFATE 75 MG: 75 TABLET ORAL at 05:25

## 2019-12-05 RX ADMIN — OXYCODONE HYDROCHLORIDE 5 MG: 5 TABLET ORAL at 05:28

## 2019-12-05 RX ADMIN — SODIUM CHLORIDE 100 ML/HR: 0.9 INJECTION, SOLUTION INTRAVENOUS at 21:03

## 2019-12-05 RX ADMIN — HYDROMORPHONE HYDROCHLORIDE 0.5 MG: 2 INJECTION, SOLUTION INTRAMUSCULAR; INTRAVENOUS; SUBCUTANEOUS at 11:33

## 2019-12-05 NOTE — PROGRESS NOTES
Pulmonary Disease Educator Note:    Pulmonary notes and respiratory medications reviewed. Pt reviewed IA today - stating not up to it. Pt is having hearth cath today. Pt resting on room air with sat 96%. Will continue to monitor.

## 2019-12-05 NOTE — PROGRESS NOTES
[facility]        LOS:  LOS: 0 days   Patient Name: Ro Nathan  Age/Sex: 74 y.o. male  : 1945  MRN: [id number]    Day of Service: 19   Length of Stay: 0  Encounter Provider: KOURTNEY Mccormick  Place of Service: [facility] CARDIOLOGY  Patient Care Team:  Yusuf Jones MD as PCP - General    Subjective:     Chief Complaint: f/u SOA / CP    Subjective: Patient reports no recurrence of band like chest pressure- he does remain dyspneic with exertion. He is hemodynamically stable, he is v paced with underlying afib/flutter with controlled rates. He reports wheezing is much improved.    Current Medications:   Scheduled Meds:  cefTRIAXone 1 g Intravenous Q24H   clopidogrel 75 mg Oral Daily   digoxin 0.25 mg Oral Daily   dilTIAZem  mg Oral Daily   insulin lispro 0-9 Units Subcutaneous 4x Daily With Meals & Nightly   ipratropium-albuterol 3 mL Nebulization 4x Daily - RT   losartan 100 mg Oral Q24H   methylPREDNISolone sodium succinate 20 mg Intravenous Q12H   metoprolol tartrate 25 mg Oral Q12H   pregabalin 150 mg Oral Q12H     Continuous Infusions:  sodium chloride 50 mL/hr Last Rate: 50 mL/hr (19 0603)       Allergies:  Allergies   Allergen Reactions   • Haloperidol Other (See Comments)   • Morphine Itching   • Pantoprazole Other (See Comments)     Feels sick after receiving       Review of Systems   Constitution: Negative.   HENT: Negative.    Cardiovascular: Positive for chest pain and dyspnea on exertion.        Band like chest pressure   Respiratory: Positive for shortness of breath.    Skin: Negative.    Musculoskeletal: Negative.    Gastrointestinal: Negative.    Genitourinary: Negative.    Neurological: Negative.          Objective:     Temp:  [97.8 °F (36.6 °C)-98.1 °F (36.7 °C)] 98 °F (36.7 °C)  Heart Rate:  [69-78] 70  Resp:  [16-19] 18  BP: (125-173)/(69-99) 160/99     Intake/Output Summary (Last 24 hours) at 2019 0757  Last  data filed at 12/5/2019 0532  Gross per 24 hour   Intake 1265 ml   Output --   Net 1265 ml     Body mass index is 33.22 kg/m².      12/03/19  1733 12/05/19  0405   Weight: 113 kg (249 lb 8 oz) 114 kg (251 lb 12.3 oz)         Physical Exam:  General Appearance:    Alert, cooperative, in no acute distress               Neck:   supple,  no JVD   Lungs:     Clear to auscultation,respirations regular, even and                  unlabored    Heart:    Regular rhythm and normal rate, normal S1 and S2, no            murmur, no gallop, no rub, no click   Abdomen:     Normal bowel sounds, no masses, no organomegaly, soft        non-tender, non-distended, no guarding, no rebound                tenderness   Extremities:   Moves all extremities well, no edema, no cyanosis, no             redness   Pulses:   Pulses palpable and equal bilaterally 1+   Skin:   No bleeding, bruising or rash   Neurologic:   Awake, alert, oriented x3         Lab Review:   Results from last 7 days   Lab Units 12/05/19 0519 12/03/19  1842   SODIUM mmol/L 137 139   POTASSIUM mmol/L 4.3 4.4   CHLORIDE mmol/L 101 103   CO2 mmol/L 25.0 26.0   BUN mg/dL 27* 27*   CREATININE mg/dL 0.97 1.29*   GLUCOSE mg/dL 193* 116*   CALCIUM mg/dL 9.2 8.8   AST (SGOT) U/L  --  17   ALT (SGPT) U/L  --  10     Results from last 7 days   Lab Units 12/04/19  0930 12/04/19  0516 12/03/19  1842   TROPONIN T ng/mL <0.010 <0.010 <0.010     Results from last 7 days   Lab Units 12/05/19  0519 12/03/19  1843   WBC 10*3/mm3 11.80* 8.20   HEMOGLOBIN g/dL 15.7 15.2   HEMATOCRIT % 45.1 42.5   PLATELETS 10*3/mm3 333 301     Results from last 7 days   Lab Units 12/05/19  0519   INR  1.07   APTT seconds 25.0         Results from last 7 days   Lab Units 12/05/19  0519   CHOLESTEROL mg/dL 160   TRIGLYCERIDES mg/dL 59   HDL CHOL mg/dL 48     Results from last 7 days   Lab Units 12/03/19  1842   PROBNP pg/mL 445.9           Recent Radiology:  Imaging Results (Most Recent)     Procedure Component  Value Units Date/Time    XR Spine Thoracic 2 View [[id number]] Collected:  12/04/19 1150     Updated:  12/04/19 1156    Narrative:       DATE OF EXAM:  12/4/2019 11:46 AM     PROCEDURE:  XR SPINE THORACIC 2 VW-     INDICATIONS:  thoracic spinal pain     COMPARISON:  No Comparisons Available     TECHNIQUE:   Two radiologic views of the thoracic spine were obtained.     FINDINGS:  There is multilevel endplate sclerosis and endplate spurring consistent  with degenerative spondylosis. There is also multilevel disc space  narrowing consistent with degenerative disc disease. There is no acute  fracture or dislocation. There are atherosclerotic vascular  calcifications within the thoracic aorta. There is a transvenous  pacemaker in place.        Impression:          1. Degenerative disc disease and spondylosis.  2. No acute fracture or dislocation.     Electronically Signed By-Yusuf Gray On:12/4/2019 11:53 AM  This report was finalized on [id number] by  Yusuf Gray, .    XR Chest PA & Lateral [[id number]] Collected:  12/03/19 2232     Updated:  12/03/19 2235    Narrative:       XR CHEST PA AND LATERAL-     Date of Exam: 12/3/2019 8:12 PM     Indication: SOB  74-year-old male     Comparison: 05/10/2018, 08/08/2016, 07/28/2015     Technique: 2 view(s) of the chest were obtained.     FINDINGS: The lungs are well expanded. Cardiac, hilar and  mediastinal silhouettes are stable with a dual lead pacemaker in place.  No significant pneumothorax, pleural effusion or focal pulmonary  parenchymal opacity. Pulmonary vascularity is within normal limits. The  trachea is midline. Visualized bony structures are intact.       Impression:       No active cardiopulmonary disease.        Electronically Signed By-Aly Euceda DO. On:12/3/2019 10:33 PM  This report was finalized on [id number] by  Aly Euceda DO..          ECHOCARDIOGRAM:    Results for orders placed during the hospital encounter of 12/03/19   Adult Transthoracic Echo  Complete W/ Cont if Necessary Per Protocol    Narrative · Mild mitral valve regurgitation is present  · Mild dilation of the sinuses of Valsalva is present.  · Left ventricular systolic function is hyperdynamic (EF > 70).  · Left atrial cavity size is severely dilated.  · Right ventricular cavity is mildly dilated.  · Left ventricular diastolic dysfunction (grade II) consistent with   pseudonormalization.     Overall normal LV systolic function and size  Normal RV function, mild RV enlargement  Severe left atrial enlargement  Diastolic dysfunction indeterminate by criteria  No masses  No effusion seen  EF hyperdynamic greater than 70%  No significant valvulopathy seen           I reviewed the patient's new clinical results.    EKG:      Assessment:       COPD exacerbation (CMS/[facility])    COPD (chronic obstructive pulmonary disease) (CMS/[facility])    1. Chest Pain, new problem, history of known CAD with LAD stenting remotely, high pretest probability  - CE negative, ruled out for acute MI presently  -reported normal stress within last 3 mo-records also demonstrate unremarkable study  - continue Plavix, pt is not on ASA, he is not on statin therapy, he does not know why he is not on aspirin    -2D echo LV systolic function 60 to 70% with no regional wall motion abnormality, severe left atrial large mid in setting of A. fib, no significant valvular abnormality  - ECG v paced, EKG uninterpretable for ischemia  - plan for elective LHC today to evaluate for anginal equivalent symtpoms     2. SOA / AE COPD  - Ddimer negative  - Tx of AE COPD per primary  -Not actively wheezing  -Antibiotics okay, per primary     3.  H/o afib s/p AV node ablation with PPM in place  - rate controlled, ECG with underlying afib/ flutter  - he is not on a/c as outpt d/t significant GIB in past, CHADS VAsc at least 2 (age, HTN)  - on digoxin, cardizem, metop     4. H/o CAD  - s/p prior LAD PCI   - on Plavix, not on statin therapy, no ASA, reported  normal stress 3 mo ago,   -Chest pain syndrome suspicious for angina, elective left heart catheterization with angiography for definitive evaluation at patient request     5. HTN  - on metop, losartan, cardizem, Hctz, Lasix  -Goal blood pressure less than 140 systolic  -Titrate medicines for goals as inpatient and outpatient, avoid hypotension     6. HLD  - will start statin therapy     7. CKD  - crt 1.2, 0.9 stable    Plan:   Patient will go for [facility] today to evaluate anginal equivalent symptoms  Continue Plavix, will start ASA and statin therapy  H/o sig GIB in past- pt is not on full a/c for afib  crt stable, no dye allergy.  Pt agreeable for procedure.    I have arranged patient outpt f/u with Dr. Yip 1/15 at 2:00pm        KOURTNEY Mccormick  12/05/19  7:57 AM

## 2019-12-05 NOTE — PROGRESS NOTES
LOS: 0 days   Patient Care Team:  Yusuf Jones MD as PCP - General    Subjective:  Feels better    Objective:   Still has back pain/anginal equivalent      Review of Systems:   Review of Systems   Constitutional: Positive for activity change.   Respiratory: Positive for chest tightness and shortness of breath.    Cardiovascular: Positive for chest pain, palpitations and leg swelling.   Gastrointestinal: Positive for abdominal distention.   Musculoskeletal: Positive for back pain.   Neurological: Positive for weakness.           Vital Signs  Temp:  [97.8 °F (36.6 °C)-98.1 °F (36.7 °C)] 98 °F (36.7 °C)  Heart Rate:  [69-78] 72  Resp:  [16-19] 18  BP: (125-173)/(69-99) 160/99    Physical Exam:  Physical Exam   Constitutional: He appears well-developed and well-nourished.   HENT:   Head: Normocephalic and atraumatic.   Eyes: EOM are normal. Pupils are equal, round, and reactive to light.   Pulmonary/Chest: Effort normal.   Abdominal: Soft.   Neurological: He is alert.   Nursing note and vitals reviewed.       Radiology:  Xr Spine Thoracic 2 View    Result Date: 12/4/2019   1. Degenerative disc disease and spondylosis. 2. No acute fracture or dislocation.  Electronically Signed By-Yusuf Gray On:12/4/2019 11:53 AM This report was finalized on [id number] by  Yusuf Gray, .    Xr Chest Pa & Lateral    Result Date: 12/3/2019  No active cardiopulmonary disease.   Electronically Signed By-Aly Euceda DO. On:12/3/2019 10:33 PM This report was finalized on [id number] by  Aly Euceda DO..         Results Review:     I reviewed the patient's new clinical results.  I reviewed the patient's new imaging results and agree with the interpretation.    Medication Review:   Scheduled Meds:  aspirin 81 mg Oral Daily   atorvastatin 40 mg Oral Nightly   cefTRIAXone 1 g Intravenous Q24H   clopidogrel 75 mg Oral Daily   digoxin 0.25 mg Oral Daily   dilTIAZem  mg Oral Daily   insulin lispro 0-9 Units Subcutaneous 4x  Daily With Meals & Nightly   ipratropium-albuterol 3 mL Nebulization 4x Daily - RT   losartan 100 mg Oral Q24H   methylPREDNISolone sodium succinate 20 mg Intravenous Q12H   metoprolol tartrate 25 mg Oral Q12H   pregabalin 150 mg Oral Q12H     Continuous Infusions:  sodium chloride 50 mL/hr Last Rate: 50 mL/hr (12/05/19 0603)     PRN Meds:.dextrose  •  dextrose  •  glucagon (human recombinant)  •  HYDROmorphone  •  insulin lispro **AND** insulin lispro  •  oxyCODONE    Labs:    CBC    Results from last 7 days   Lab Units 12/05/19 0519 12/03/19  1843   WBC 10*3/mm3 11.80* 8.20   HEMOGLOBIN g/dL 15.7 15.2   PLATELETS 10*3/mm3 333 301     BMP Results from last 7 days   Lab Units 12/05/19 0519 12/03/19  1842   SODIUM mmol/L 137 139   POTASSIUM mmol/L 4.3 4.4   CHLORIDE mmol/L 101 103   CO2 mmol/L 25.0 26.0   BUN mg/dL 27* 27*   CREATININE mg/dL 0.97 1.29*   GLUCOSE mg/dL 193* 116*     Cr Clearance Estimated Creatinine Clearance: 89.7 mL/min (by C-G formula based on SCr of 0.97 mg/dL).  Coag   Results from last 7 days   Lab Units 12/05/19 0519   INR  1.07   APTT seconds 25.0     HbA1C No results found for: HGBA1C  Blood Glucose   Glucose   Date/Time Value Ref Range Status   12/05/2019 0731 190 (H) 70 - 105 mg/dL Final     Comment:     Serial Number: [id number]:  [id number]   12/04/2019 2105 121 (H) 70 - 105 mg/dL Final     Comment:     Serial Number: [id number]:  [id number]   12/04/2019 1640 217 (H) 70 - 105 mg/dL Final     Comment:     Serial Number: [id number]:  [id number]   12/04/2019 1132 192 (H) 70 - 105 mg/dL Final     Comment:     Serial Number: [id number]:  [id number]   12/04/2019 0711 178 (H) 70 - 105 mg/dL Final     Comment:     Serial Number: [id number]:  [id number]   12/03/2019 1907 137 (H) 70 - 105 mg/dL Final     Comment:     Serial Number: [id number]:  [id number]     Infection     CMP Results from last 7 days   Lab Units 12/05/19  0519 12/03/19  1842   SODIUM mmol/L  137 139   POTASSIUM mmol/L 4.3 4.4   CHLORIDE mmol/L 101 103   CO2 mmol/L 25.0 26.0   BUN mg/dL 27* 27*   CREATININE mg/dL 0.97 1.29*   GLUCOSE mg/dL 193* 116*   ALBUMIN g/dL  --  3.90   BILIRUBIN mg/dL  --  0.5   ALK PHOS U/L  --  124*   AST (SGOT) U/L  --  17   ALT (SGPT) U/L  --  10     UA      Radiology(recent) Xr Spine Thoracic 2 View    Result Date: 12/4/2019   1. Degenerative disc disease and spondylosis. 2. No acute fracture or dislocation.  Electronically Signed By-Yusuf Gray On:12/4/2019 11:53 AM This report was finalized on [id number] by  Yusuf Gray, .    Xr Chest Pa & Lateral    Result Date: 12/3/2019  No active cardiopulmonary disease.   Electronically Signed By-Aly Euceda DO. On:12/3/2019 10:33 PM This report was finalized on [id number] by  Aly Euceda DO..     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  Acute on chronic mucopurulent bronchitis  Acute thoracic back pain  Substernal chest pain//anginal equivalent  Atherosclerotic heart disease of native coronary arteries with angina pectoris  Exogenous obesity  History of alcoholism  Nicotine dependency with nicotine use disorder  Myofascial pain syndrome  Chronic low back pain  Degenerative disc disease lumbosacral spine  Atrial fibrillation  History of pacemaker placement  Primary essential hypertension  Dyslipidemia associated with type 2 diabetes  History of percutaneous coronary intervention with stent placement  History of prostate cancer  Peripheral vascular disease  Chronic systolic congestive heart failure  Adjustment disorder with minor recurrent endogenous depression  Anemia of chronic disease  Herbert's esophagus with low-grade dysplasia  Chronic respiratory failure with hypoxia  Cirrhosis  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

## 2019-12-06 ENCOUNTER — NURSE TRIAGE (OUTPATIENT)
Dept: CALL CENTER | Facility: HOSPITAL | Age: 74
End: 2019-12-06

## 2019-12-06 VITALS
WEIGHT: 250.44 LBS | DIASTOLIC BLOOD PRESSURE: 78 MMHG | OXYGEN SATURATION: 96 % | TEMPERATURE: 97.3 F | SYSTOLIC BLOOD PRESSURE: 173 MMHG | HEIGHT: 73 IN | HEART RATE: 70 BPM | BODY MASS INDEX: 33.19 KG/M2 | RESPIRATION RATE: 16 BRPM

## 2019-12-06 LAB
ANION GAP SERPL CALCULATED.3IONS-SCNC: 12 MMOL/L (ref 5–15)
BUN BLD-MCNC: 27 MG/DL (ref 8–23)
BUN/CREAT SERPL: 25 (ref 7–25)
CALCIUM SPEC-SCNC: 9.4 MG/DL (ref 8.6–10.5)
CHLORIDE SERPL-SCNC: 98 MMOL/L (ref 98–107)
CO2 SERPL-SCNC: 26 MMOL/L (ref 22–29)
CREAT BLD-MCNC: 1.08 MG/DL (ref 0.76–1.27)
DEPRECATED RDW RBC AUTO: 51.2 FL (ref 37–54)
ERYTHROCYTE [DISTWIDTH] IN BLOOD BY AUTOMATED COUNT: 15.6 % (ref 12.3–15.4)
GFR SERPL CREATININE-BSD FRML MDRD: 67 ML/MIN/1.73
GLUCOSE BLD-MCNC: 193 MG/DL (ref 65–99)
GLUCOSE BLDC GLUCOMTR-MCNC: 141 MG/DL (ref 70–105)
GLUCOSE BLDC GLUCOMTR-MCNC: 146 MG/DL (ref 70–105)
HCT VFR BLD AUTO: 48.1 % (ref 37.5–51)
HGB BLD-MCNC: 16.7 G/DL (ref 13–17.7)
MCH RBC QN AUTO: 32.1 PG (ref 26.6–33)
MCHC RBC AUTO-ENTMCNC: 34.8 G/DL (ref 31.5–35.7)
MCV RBC AUTO: 92.3 FL (ref 79–97)
PLATELET # BLD AUTO: 372 10*3/MM3 (ref 140–450)
PMV BLD AUTO: 8.3 FL (ref 6–12)
POTASSIUM BLD-SCNC: 4.6 MMOL/L (ref 3.5–5.2)
RBC # BLD AUTO: 5.21 10*6/MM3 (ref 4.14–5.8)
SODIUM BLD-SCNC: 136 MMOL/L (ref 136–145)
WBC NRBC COR # BLD: 14.9 10*3/MM3 (ref 3.4–10.8)

## 2019-12-06 PROCEDURE — 25010000002 METHYLPREDNISOLONE PER 40 MG: Performed by: FAMILY MEDICINE

## 2019-12-06 PROCEDURE — 82962 GLUCOSE BLOOD TEST: CPT

## 2019-12-06 PROCEDURE — 80048 BASIC METABOLIC PNL TOTAL CA: CPT | Performed by: INTERNAL MEDICINE

## 2019-12-06 PROCEDURE — 25010000002 HYDROMORPHONE PER 4 MG: Performed by: FAMILY MEDICINE

## 2019-12-06 PROCEDURE — 93005 ELECTROCARDIOGRAM TRACING: CPT | Performed by: INTERNAL MEDICINE

## 2019-12-06 PROCEDURE — 85027 COMPLETE CBC AUTOMATED: CPT | Performed by: INTERNAL MEDICINE

## 2019-12-06 PROCEDURE — 94799 UNLISTED PULMONARY SVC/PX: CPT

## 2019-12-06 RX ORDER — PREDNISONE 10 MG/1
TABLET ORAL
Qty: 1 EACH | Refills: 0 | Status: SHIPPED | OUTPATIENT
Start: 2019-12-06 | End: 2020-03-19 | Stop reason: HOSPADM

## 2019-12-06 RX ORDER — PRASUGREL 10 MG/1
10 TABLET, FILM COATED ORAL DAILY
Status: DISCONTINUED | OUTPATIENT
Start: 2019-12-07 | End: 2019-12-06 | Stop reason: HOSPADM

## 2019-12-06 RX ORDER — ATORVASTATIN CALCIUM 40 MG/1
40 TABLET, FILM COATED ORAL NIGHTLY
Qty: 30 TABLET | Refills: 1 | Status: SHIPPED | OUTPATIENT
Start: 2019-12-06 | End: 2021-07-16

## 2019-12-06 RX ORDER — DOXYCYCLINE HYCLATE 100 MG/1
100 TABLET, DELAYED RELEASE ORAL 2 TIMES DAILY
Qty: 14 TABLET | Refills: 0 | Status: SHIPPED | OUTPATIENT
Start: 2019-12-06 | End: 2020-03-15 | Stop reason: SDUPTHER

## 2019-12-06 RX ORDER — ASPIRIN 81 MG/1
81 TABLET ORAL DAILY
Qty: 30 TABLET | Refills: 1 | Status: SHIPPED | OUTPATIENT
Start: 2019-12-06 | End: 2020-10-29 | Stop reason: HOSPADM

## 2019-12-06 RX ORDER — PRASUGREL 10 MG/1
60 TABLET, FILM COATED ORAL ONCE
Status: COMPLETED | OUTPATIENT
Start: 2019-12-06 | End: 2019-12-06

## 2019-12-06 RX ORDER — PRASUGREL 10 MG/1
10 TABLET, FILM COATED ORAL DAILY
Qty: 30 TABLET | Refills: 6 | Status: SHIPPED | OUTPATIENT
Start: 2019-12-07 | End: 2019-12-18 | Stop reason: SDUPTHER

## 2019-12-06 RX ADMIN — HYDROMORPHONE HYDROCHLORIDE 0.5 MG: 2 INJECTION, SOLUTION INTRAMUSCULAR; INTRAVENOUS; SUBCUTANEOUS at 03:44

## 2019-12-06 RX ADMIN — DIGOXIN 0.25 MG: 250 TABLET ORAL at 09:44

## 2019-12-06 RX ADMIN — DILTIAZEM HYDROCHLORIDE 120 MG: 120 CAPSULE, COATED, EXTENDED RELEASE ORAL at 09:59

## 2019-12-06 RX ADMIN — ASPIRIN 81 MG: 81 TABLET, DELAYED RELEASE ORAL at 10:00

## 2019-12-06 RX ADMIN — PREGABALIN 150 MG: 75 CAPSULE ORAL at 09:44

## 2019-12-06 RX ADMIN — PRASUGREL 60 MG: 10 TABLET, FILM COATED ORAL at 10:30

## 2019-12-06 RX ADMIN — METOPROLOL TARTRATE 25 MG: 25 TABLET ORAL at 09:44

## 2019-12-06 RX ADMIN — LOSARTAN POTASSIUM 100 MG: 50 TABLET, FILM COATED ORAL at 10:00

## 2019-12-06 RX ADMIN — METHYLPREDNISOLONE SODIUM SUCCINATE 20 MG: 40 INJECTION, POWDER, FOR SOLUTION INTRAMUSCULAR; INTRAVENOUS at 10:00

## 2019-12-06 RX ADMIN — IPRATROPIUM BROMIDE AND ALBUTEROL SULFATE 3 ML: .5; 3 SOLUTION RESPIRATORY (INHALATION) at 08:08

## 2019-12-06 RX ADMIN — HYDROMORPHONE HYDROCHLORIDE 0.5 MG: 2 INJECTION, SOLUTION INTRAMUSCULAR; INTRAVENOUS; SUBCUTANEOUS at 07:53

## 2019-12-06 NOTE — DISCHARGE SUMMARY
Date of Discharge:  12/6/2019    Discharge Diagnosis: COPD exacerbation, CAD s/p stent placement    Presenting Problem/History of Present Illness  Active Hospital Problems    Diagnosis  POA   • COPD exacerbation (CMS/[facility]) [J44.1]  Yes   • COPD (chronic obstructive pulmonary disease) (CMS/[facility]) [J44.9]  Yes   • Unstable angina (CMS/[facility]) [I20.0]  Yes   • Status post coronary artery stent placement [Z95.5]  Not Applicable      Resolved Hospital Problems   No resolved problems to display.          Hospital Course  Patient is a 74 y.o. male presented with shortness of breath.  He was directly admitted from his PCP office on December 3, 2019.  He was given IV antibiotics and steroids as well as duoneb treatments for COPD exacerbation.  Cardiology was consulted for chest pain with a history of coronary artery disease. He was ruled out for MI with negative serial cardiac enzymes. He had elective left heart catheterization with stent placement on December 5, 2019.  He is feeling much better today and would like to be discharged home.  He will be discharged home on doxycycline and a prednisone taper.  He will need to follow-up with Dr. Jones within 1 to 2 weeks.  He will need to follow-up with cardiology as directed by them. .      Procedures Performed    Procedure(s):  Left Heart Cath  Intravascular Ultrasound  Percutaneous Coronary Intervention  Stent MICHAEL coronary  -------------------       Consults:   Consults     Date and Time Order Name Status Description    12/4/2019 0735 Inpatient Cardiology Consult Completed           Pertinent Test Results:   Lab Results (most recent)     Procedure Component Value Units Date/Time    POC Glucose Once [[id number]]  (Abnormal) Collected:  12/06/19 0707    Specimen:  Blood Updated:  12/06/19 0710     Glucose 146 mg/dL      Comment: Serial Number: [id number]:  [id number]       CBC (No Diff) [[id number]]  (Abnormal) Collected:  12/06/19 0404    Specimen:  Blood Updated:  12/06/19  0511     WBC 14.90 10*3/mm3      RBC 5.21 10*6/mm3      Hemoglobin 16.7 g/dL      Hematocrit 48.1 %      MCV 92.3 fL      MCH 32.1 pg      MCHC 34.8 g/dL      RDW 15.6 %      RDW-SD 51.2 fl      MPV 8.3 fL      Platelets 372 10*3/mm3      Comment: Result checked        Basic Metabolic Panel [[id number]]  (Abnormal) Collected:  12/06/19 0404    Specimen:  Blood Updated:  12/06/19 0443     Glucose 193 mg/dL      BUN 27 mg/dL      Creatinine 1.08 mg/dL      Sodium 136 mmol/L      Potassium 4.6 mmol/L      Chloride 98 mmol/L      CO2 26.0 mmol/L      Calcium 9.4 mg/dL      eGFR Non African Amer 67 mL/min/1.73      BUN/Creatinine Ratio 25.0     Anion Gap 12.0 mmol/L     Narrative:       GFR Normal >60  Chronic Kidney Disease <60  Kidney Failure <15    POC Glucose Once [[id number]]  (Abnormal) Collected:  12/05/19 2040    Specimen:  Blood Updated:  12/05/19 2041     Glucose 271 mg/dL      Comment: Serial Number: [id number]:  [id number]       POC Activated Clotting Time [[id number]]  (Abnormal) Collected:  12/05/19 1740    Specimen:  Blood Updated:  12/05/19 1753     Activated Clotting Time  279 Seconds      Comment: Serial Number: [id number]:  [id number]       POC Activated Clotting Time [[id number]]  (Abnormal) Collected:  12/05/19 1714    Specimen:  Blood Updated:  12/05/19 1753     Activated Clotting Time  340 Seconds      Comment: Serial Number: [id number]:  [id number]       Basic Metabolic Panel [[id number]]  (Abnormal) Collected:  12/05/19 0519    Specimen:  Blood Updated:  12/05/19 0653     Glucose 193 mg/dL      BUN 27 mg/dL      Creatinine 0.97 mg/dL      Sodium 137 mmol/L      Potassium 4.3 mmol/L      Chloride 101 mmol/L      CO2 25.0 mmol/L      Calcium 9.2 mg/dL      eGFR Non African Amer 76 mL/min/1.73      BUN/Creatinine Ratio 27.8     Anion Gap 11.0 mmol/L     Narrative:       GFR Normal >60  Chronic Kidney Disease <60  Kidney Failure <15    Lipid Panel [[id number]] Collected:  12/05/19 0519    Specimen:   Blood Updated:  12/05/19 0650     Total Cholesterol 160 mg/dL      Triglycerides 59 mg/dL      HDL Cholesterol 48 mg/dL      LDL Cholesterol  100 mg/dL      VLDL Cholesterol 11.8 mg/dL      LDL/HDL Ratio 2.09    Narrative:       Cholesterol Reference Ranges  (U.S. Department of Health and Human Services ATP III Classifications)    Desirable          <200 mg/dL  Borderline High    200-239 mg/dL  High Risk          >240 mg/dL      Triglyceride Reference Ranges  (U.S. Department of Health and Human Services ATP III Classifications)    Normal           <150 mg/dL  Borderline High  150-199 mg/dL  High             200-499 mg/dL  Very High        >500 mg/dL    HDL Reference Ranges  (U.S. Department of Health and Human Services ATP III Classifcations)    Low     <40 mg/dl (major risk factor for CHD)  High    >60 mg/dl ('negative' risk factor for CHD)        LDL Reference Ranges  (U.S. Department of Health and Human Services ATP III Classifcations)    Optimal          <100 mg/dL  Near Optimal     100-129 mg/dL  Borderline High  130-159 mg/dL  High             160-189 mg/dL  Very High        >189 mg/dL    Protime-INR [[id number]]  (Normal) Collected:  12/05/19 0519    Specimen:  Blood Updated:  12/05/19 0635     Protime 11.1 Seconds      INR 1.07    aPTT [[id number]]  (Normal) Collected:  12/05/19 0519    Specimen:  Blood Updated:  12/05/19 0635     PTT 25.0 seconds     CBC (No Diff) [[id number]]  (Abnormal) Collected:  12/05/19 0519    Specimen:  Blood Updated:  12/05/19 0624     WBC 11.80 10*3/mm3      RBC 4.91 10*6/mm3      Hemoglobin 15.7 g/dL      Hematocrit 45.1 %      MCV 91.9 fL      MCH 32.0 pg      MCHC 34.8 g/dL      RDW 15.1 %      RDW-SD 48.1 fl      MPV 8.2 fL      Platelets 333 10*3/mm3     Troponin [[id number]]  (Normal) Collected:  12/04/19 0930    Specimen:  Blood Updated:  12/04/19 1038     Troponin T <0.010 ng/mL     Narrative:       Troponin T Reference Range:  <= 0.03 ng/mL-   Negative for AMI  >0.03 ng/mL-      Abnormal for myocardial necrosis.  Clinicians would have to utilize clinical acumen, EKG, Troponin and serial changes to determine if it is an Acute Myocardial Infarction or myocardial injury due to an underlying chronic condition.     Troponin [[id number]]  (Normal) Collected:  12/04/19 0516    Specimen:  Blood Updated:  12/04/19 0559     Troponin T <0.010 ng/mL     Narrative:       Troponin T Reference Range:  <= 0.03 ng/mL-   Negative for AMI  >0.03 ng/mL-     Abnormal for myocardial necrosis.  Clinicians would have to utilize clinical acumen, EKG, Troponin and serial changes to determine if it is an Acute Myocardial Infarction or myocardial injury due to an underlying chronic condition.     Comprehensive Metabolic Panel [[id number]]  (Abnormal) Collected:  12/03/19 1842    Specimen:  Blood Updated:  12/03/19 1931     Glucose 116 mg/dL      BUN 27 mg/dL      Creatinine 1.29 mg/dL      Sodium 139 mmol/L      Potassium 4.4 mmol/L      Chloride 103 mmol/L      CO2 26.0 mmol/L      Calcium 8.8 mg/dL      Total Protein 6.9 g/dL      Albumin 3.90 g/dL      ALT (SGPT) 10 U/L      AST (SGOT) 17 U/L      Alkaline Phosphatase 124 U/L      Total Bilirubin 0.5 mg/dL      eGFR Non African Amer 55 mL/min/1.73      Globulin 3.0 gm/dL      A/G Ratio 1.3 g/dL      BUN/Creatinine Ratio 20.9     Anion Gap 10.0 mmol/L     Narrative:       GFR Normal >60  Chronic Kidney Disease <60  Kidney Failure <15    BNP [[id number]]  (Normal) Collected:  12/03/19 1842    Specimen:  Blood Updated:  12/03/19 1928     proBNP 445.9 pg/mL     Narrative:       Among patients with dyspnea, NT-proBNP is highly sensitive for the detection of acute congestive heart failure. In addition NT-proBNP of <300 pg/ml effectively rules out acute congestive heart failure with 99% negative predictive value.    D-dimer, Quantitative [[id number]]  (Normal) Collected:  12/03/19 1842    Specimen:  Blood Updated:  12/03/19 1917     D-Dimer, Quantitative 0.47 MCGFEU/mL      Narrative:       Reference Range  --------------------------------------------------------------------     < 0.50   Negative Predictive Value  0.50-0.59   Indeterminate    >= 0.60   Probable VTE             A very low percentage of patients with DVT may yield D-Dimer results   below the cut-off of 0.50 MCGFEU/mL.  This is known to be more   prevalent in patients with distal DVT.             Results of this test should always be interpreted in conjunction with   the patient's medical history, clinical presentation and other   findings.  Clinical diagnosis should not be based on the result of   [place] D-Dimer alone.    CBC & Differential [[id number]] Collected:  12/03/19 1843    Specimen:  Blood Updated:  12/03/19 1904    Narrative:       The following orders were created for panel order CBC & Differential.  Procedure                               Abnormality         Status                     ---------                               -----------         ------                     CBC Auto Differential[[id number]]        Abnormal            Final result                 Please view results for these tests on the individual orders.    CBC Auto Differential [[id number]]  (Abnormal) Collected:  12/03/19 1843    Specimen:  Blood Updated:  12/03/19 1904     WBC 8.20 10*3/mm3      RBC 4.63 10*6/mm3      Hemoglobin 15.2 g/dL      Hematocrit 42.5 %      MCV 91.7 fL      MCH 32.8 pg      MCHC 35.8 g/dL      RDW 15.3 %      RDW-SD 49.0 fl      MPV 8.0 fL      Platelets 301 10*3/mm3      Neutrophil % 62.3 %      Lymphocyte % 22.0 %      Monocyte % 8.9 %      Eosinophil % 5.6 %      Basophil % 1.2 %      Neutrophils, Absolute 5.10 10*3/mm3      Lymphocytes, Absolute 1.80 10*3/mm3      Monocytes, Absolute 0.70 10*3/mm3      Eosinophils, Absolute 0.50 10*3/mm3      Basophils, Absolute 0.10 10*3/mm3      nRBC 0.1 /100 WBC            Condition on Discharge:  Fair    Vital Signs  Temp:  [97.3 °F (36.3 °C)-97.7 °F (36.5 °C)] 97.6 °F (36.4  °C)  Heart Rate:  [69-74] 70  Resp:  [16-24] 20  BP: (149-210)/(74-96) 176/92    Physical Exam   Constitutional: He is oriented to person, place, and time. He appears well-developed and well-nourished.   HENT:   Head: Normocephalic and atraumatic.   Neck: Normal range of motion.   Cardiovascular: Normal rate, regular rhythm and normal heart sounds.   Pulmonary/Chest: Effort normal and breath sounds normal.   Abdominal: Soft. Bowel sounds are normal. There is no tenderness.   Musculoskeletal: He exhibits no edema.   Neurological: He is alert and oriented to person, place, and time.   Skin: Skin is warm and dry.   Psychiatric: He has a normal mood and affect. His behavior is normal. Judgment and thought content normal.           Discharge Disposition  Home or Self Care    Discharge Medications     Discharge Medications      New Medications      Instructions Start Date   aspirin 81 MG EC tablet   81 mg, Oral, Daily      atorvastatin 40 MG tablet  Commonly known as:  LIPITOR   40 mg, Oral, Nightly      doxycycline 100 MG enteric coated tablet  Commonly known as:  DORYX   100 mg, Oral, 2 Times Daily      predniSONE 10 MG (48) tablet pack  Commonly known as:  DELTASONE   Taper as directed.         Changes to Medications      Instructions Start Date   metoprolol tartrate 25 MG tablet  Commonly known as:  LOPRESSOR  What changed:    · when to take this  · Another medication with the same name was removed. Continue taking this medication, and follow the directions you see here.   25 mg, Oral, Every 12 Hours Scheduled         Continue These Medications      Instructions Start Date   clopidogrel 75 MG tablet  Commonly known as:  PLAVIX   75 mg, Oral, Daily      digoxin 250 MCG tablet  Commonly known as:  LANOXIN   0.25 mg, Oral, Daily      dilTIAZem  MG 24 hr capsule  Commonly known as:  CARDIZEM CD   120 mg, Oral, Daily      furosemide 40 MG tablet  Commonly known as:  LASIX   40 mg, Oral      losartan 100 MG  tablet  Commonly known as:  COZAAR   No dose, route, or frequency recorded.      LYRICA 150 MG capsule  Generic drug:  pregabalin   150 mg, 2 Times Daily      mirtazapine 30 MG tablet  Commonly known as:  REMERON   No dose, route, or frequency recorded.      oxyCODONE-acetaminophen  MG per tablet  Commonly known as:  PERCOCET   1 tablet, Oral, Every 6 Hours PRN      POTASSIUM CHLORIDE PO   Oral, 2 Times Daily      traZODone 100 MG tablet  Commonly known as:  DESYREL   No dose, route, or frequency recorded.      VENTOLIN  (90 Base) MCG/ACT inhaler  Generic drug:  albuterol sulfate HFA   As Needed      vitamin D 1.25 MG (35354 UT) capsule capsule  Commonly known as:  ERGOCALCIFEROL   50,000 Units, Oral         Stop These Medications    hydroCHLOROthiazide 25 MG tablet  Commonly known as:  HYDRODIURIL     temazepam 30 MG capsule  Commonly known as:  RESTORIL            Discharge Diet:   Diet Instructions     Diet: Consistent Carbohydrate, Cardiac      Discharge Diet:   Consistent Carbohydrate  Cardiac             Activity at Discharge:   Activity Instructions     Activity as Tolerated      Gradually Increase Activity Until at Pre-Hospitalization Level            Follow-up Appointments  Future Appointments   Date Time Provider Department Center   1/15/2020  2:00 PM Joshua Yip MD [facility] None   1/23/2020  3:00 PM Herberth Oconnor MD [facility]  [facility] None     Additional Instructions for the Follow-ups that You Need to Schedule     Call MD With Problems / Concerns   As directed      Call for any problems.    Order Comments:  Call for any problems.          Discharge Follow-up with PCP   As directed       Currently Documented PCP:    Yusuf Jones MD    PCP Phone Number:    [phone]     Follow Up Details:  in 1 week. Patient prefers appointment after 1:30 pm.               Test Results Pending at Discharge       KOURTNEY Antunez  12/06/19  7:26 AM

## 2019-12-06 NOTE — PROGRESS NOTES
[facility]        LOS:  LOS: 1 day   Patient Name: Ro Nathan  Age/Sex: 74 y.o. male  : 1945  MRN: [id number]    Day of Service: 19   Length of Stay: 1  Encounter Provider: KOURTNEY Mccormick  Place of Service: [facility] C[facility]  Patient Care Team:  Yusuf Jones MD as PCP - General    Subjective:     Chief Complaint: f/u SOA, CP, CAD    Subjective: Patient feels well today.  He denies chest pain or SOA.  He has ambulated without difficulty.  He has no groin complications.  He is V paced on telemetry.    Current Medications:   Scheduled Meds:  aspirin 81 mg Oral Daily   atorvastatin 40 mg Oral Nightly   cefTRIAXone 1 g Intravenous Q24H   digoxin 0.25 mg Oral Daily   dilTIAZem  mg Oral Daily   insulin lispro 0-9 Units Subcutaneous 4x Daily With Meals & Nightly   ipratropium-albuterol 3 mL Nebulization 4x Daily - RT   losartan 100 mg Oral Q24H   methylPREDNISolone sodium succinate 20 mg Intravenous Q12H   metoprolol tartrate 25 mg Oral Q12H   [START ON 2019] prasugrel 10 mg Oral Daily   prasugrel 60 mg Oral Once   pregabalin 150 mg Oral Q12H     Continuous Infusions:  sodium chloride 50 mL/hr Last Rate: 50 mL/hr (19 0603)   sodium chloride 100 mL/hr Last Rate: 100 mL/hr (19)       Allergies:  Allergies   Allergen Reactions   • Haloperidol Other (See Comments)   • Morphine Itching   • Pantoprazole Other (See Comments)     Feels sick after receiving       Review of Systems   Constitution: Negative.   HENT: Negative.    Cardiovascular: Negative.    Respiratory:        Mild baseline SOA in setting of COPD   Skin: Negative.    Musculoskeletal: Negative.    Gastrointestinal: Negative.    Genitourinary: Negative.    Neurological: Negative.          Objective:     Temp:  [97.3 °F (36.3 °C)-97.7 °F (36.5 °C)] 97.6 °F (36.4 °C)  Heart Rate:  [69-74] 72  Resp:  [16-24] 18  BP: (149-210)/(74-96) 176/92   No intake or output data  in the 24 hours ending 12/06/19 1011  Body mass index is 33.05 kg/m².      12/03/19  1733 12/05/19  0405 12/05/19  1945   Weight: 113 kg (249 lb 8 oz) 114 kg (251 lb 12.3 oz) 114 kg (250 lb 7.1 oz)         Physical Exam:  General Appearance:    Alert, cooperative, in no acute distress               Neck:   supple, no JVD   Lungs:     Clear to auscultation, room air,,respirations regular, even and unlabored    Heart:    V paced on telemetry, underlying afib, normal S1 and S2, no            murmur, no gallop, no rub, no click   Abdomen:     Normal bowel sounds, no masses, no organomegaly, soft        non-tender, non-distended, no guarding, no rebound                tenderness   Extremities:   Moves all extremities well, no edema, no cyanosis, no             Redness groin soft without oozing bruising or hematoma   Pulses:   Pulses palpable and equal bilaterally1+   Skin:   No bleeding, bruising or rash   Neurologic:   Awake, alert, oriented x3         Lab Review:   Results from last 7 days   Lab Units 12/06/19  0404 12/05/19 0519 12/03/19  1842   SODIUM mmol/L 136 137 139   POTASSIUM mmol/L 4.6 4.3 4.4   CHLORIDE mmol/L 98 101 103   CO2 mmol/L 26.0 25.0 26.0   BUN mg/dL 27* 27* 27*   CREATININE mg/dL 1.08 0.97 1.29*   GLUCOSE mg/dL 193* 193* 116*   CALCIUM mg/dL 9.4 9.2 8.8   AST (SGOT) U/L  --   --  17   ALT (SGPT) U/L  --   --  10     Results from last 7 days   Lab Units 12/04/19  0930 12/04/19  0516 12/03/19  1842   TROPONIN T ng/mL <0.010 <0.010 <0.010     Results from last 7 days   Lab Units 12/06/19  0404 12/05/19 0519   WBC 10*3/mm3 14.90* 11.80*   HEMOGLOBIN g/dL 16.7 15.7   HEMATOCRIT % 48.1 45.1   PLATELETS 10*3/mm3 372 333     Results from last 7 days   Lab Units 12/05/19 0519   INR  1.07   APTT seconds 25.0         Results from last 7 days   Lab Units 12/05/19  0519   CHOLESTEROL mg/dL 160   TRIGLYCERIDES mg/dL 59   HDL CHOL mg/dL 48     Results from last 7 days   Lab Units 12/03/19  1842   PROBNP pg/mL  445.9           Recent Radiology:  Imaging Results (Most Recent)     Procedure Component Value Units Date/Time    XR Spine Thoracic 2 View [[id number]] Collected:  12/04/19 1150     Updated:  12/04/19 1156    Narrative:       DATE OF EXAM:  12/4/2019 11:46 AM     PROCEDURE:  XR SPINE THORACIC 2 VW-     INDICATIONS:  thoracic spinal pain     COMPARISON:  No Comparisons Available     TECHNIQUE:   Two radiologic views of the thoracic spine were obtained.     FINDINGS:  There is multilevel endplate sclerosis and endplate spurring consistent  with degenerative spondylosis. There is also multilevel disc space  narrowing consistent with degenerative disc disease. There is no acute  fracture or dislocation. There are atherosclerotic vascular  calcifications within the thoracic aorta. There is a transvenous  pacemaker in place.        Impression:          1. Degenerative disc disease and spondylosis.  2. No acute fracture or dislocation.     Electronically Signed By-Yusuf Gray On:12/4/2019 11:53 AM  This report was finalized on [id number] by  Yusuf Gray, .    XR Chest PA & Lateral [[id number]] Collected:  12/03/19 2232     Updated:  12/03/19 2235    Narrative:       XR CHEST PA AND LATERAL-     Date of Exam: 12/3/2019 8:12 PM     Indication: SOB  74-year-old male     Comparison: 05/10/2018, 08/08/2016, 07/28/2015     Technique: 2 view(s) of the chest were obtained.     FINDINGS: The lungs are well expanded. Cardiac, hilar and  mediastinal silhouettes are stable with a dual lead pacemaker in place.  No significant pneumothorax, pleural effusion or focal pulmonary  parenchymal opacity. Pulmonary vascularity is within normal limits. The  trachea is midline. Visualized bony structures are intact.       Impression:       No active cardiopulmonary disease.        Electronically Signed By-Aly Euceda DO. On:12/3/2019 10:33 PM  This report was finalized on [id number] by  Aly Euceda DO..           ECHOCARDIOGRAM:    Results for orders placed during the hospital encounter of 12/03/19   Adult Transthoracic Echo Complete W/ Cont if Necessary Per Protocol    Narrative · Mild mitral valve regurgitation is present  · Mild dilation of the sinuses of Valsalva is present.  · Left ventricular systolic function is hyperdynamic (EF > 70).  · Left atrial cavity size is severely dilated.  · Right ventricular cavity is mildly dilated.  · Left ventricular diastolic dysfunction (grade II) consistent with   pseudonormalization.     Overall normal LV systolic function and size  Normal RV function, mild RV enlargement  Severe left atrial enlargement  Diastolic dysfunction indeterminate by criteria  No masses  No effusion seen  EF hyperdynamic greater than 70%  No significant valvulopathy seen           I reviewed the patient's new clinical results.    EKG:      Assessment:       Status post coronary artery stent placement    COPD exacerbation (CMS/HCC)    COPD (chronic obstructive pulmonary disease) (CMS/HCC)    Unstable angina (CMS/HCC)    1. Chest Pain, new problem, history of known CAD with LAD stenting remotely, high pretest probability  - CE negative, ruled out for acute MI presently  -reported normal stress within last 3 mo-records also demonstrate unremarkable study  - [facility] 12/5/19: PCI with MICHAEL to ostial proximal LAD, residual LCx lesions- medical management and f/u. Consider staged intervention if CP continues  - ASA / Brilinta load 12/5, given patient's COPD will load with effient and change to ASA / Effient  -2D echo LV systolic function 60 to 70% with no regional wall motion abnormality, severe left atrial large mid in setting of A. fib, no significant valvular abnormality  - ECG v paced, EKG uninterpretable for ischemia     2. SOA / AE COPD  - Ddimer negative  - Tx of AE COPD per primary  -Not actively wheezing  -Antibiotics okay, per primary     3.  H/o afib s/p AV node ablation with PPM in place  - rate  controlled, ECG with underlying afib/ flutter  - he is not on a/c as outpt d/t significant GIB in past, CHADS VAsc at least 2 (age, HTN)  - on digoxin, cardizem, metop     4. H/o CAD  - s/p prior LAD PCI   - on Plavix, not on statin therapy, no ASA, reported normal stress 3 mo ago,   -Chest pain syndrome suspicious for angina, elective left heart catheterization with angiography for definitive evaluation at patient request     5. HTN  - on metop, losartan, cardizem, Hctz, Lasix  -Goal blood pressure less than 140 systolic  -Titrate medicines for goals as inpatient and outpatient, avoid hypotension     6. HLD  - will start statin therapy     7. CKD  - crt 1.2, 0.9, 1.08 stable    Plan:   Patient is doing well post PCI  Must continue DAPT with ASA / Effient (load today with 60mg, followed by 10mg daily starting tomorrow)  Continue statin therapy  Continue BB  No a/c with underlying afib given significant GIB in past    Patient stable for discharge today.  He will f/u with Dr Yip in clinic- I will arrange appointment.        KOURTNEY Mccormick  12/06/19  10:11 AM

## 2019-12-07 ENCOUNTER — READMISSION MANAGEMENT (OUTPATIENT)
Dept: CALL CENTER | Facility: HOSPITAL | Age: 74
End: 2019-12-07

## 2019-12-07 NOTE — TELEPHONE ENCOUNTER
"Reviewed AVS with caller, advice per AVS.     Reason for Disposition  • Health Information question, no triage required and triager able to answer question    Additional Information  • Negative: [1] Caller is not with the adult (patient) AND [2] reporting urgent symptoms  • Negative: Lab result questions  • Negative: Medication questions  • Negative: Caller cannot be reached by phone  • Negative: Caller has already spoken to PCP or another triager  • Negative: RN needs further essential information from caller in order to complete triage  • Negative: Requesting regular office appointment  • Negative: [1] Caller requesting NON-URGENT health information AND [2] PCP's office is the best resource    Answer Assessment - Initial Assessment Questions  1. REASON FOR CALL or QUESTION: \"What is your reason for calling today?\" or \"How can I best help you?\" or \"What question do you have that I can help answer?\"      Asking if Mr. Nathan can take a shower today. Had cath 2 days ago.    Protocols used: INFORMATION ONLY CALL-ADULT-      "

## 2019-12-07 NOTE — OUTREACH NOTE
Prep Survey      Responses   Facility patient discharged from?  [facility]   Is patient eligible?  Yes   Discharge diagnosis  COPD exac   Does the patient have one of the following disease processes/diagnoses(primary or secondary)?  COPD/Pneumonia   Does the patient have Home health ordered?  No   Is there a DME ordered?  No   Prep survey completed?  Yes          Anju Mora RN

## 2019-12-08 ENCOUNTER — HOSPITAL ENCOUNTER (EMERGENCY)
Facility: HOSPITAL | Age: 74
Discharge: HOME | End: 2019-12-08
Attending: EMERGENCY MEDICINE | Admitting: EMERGENCY MEDICINE

## 2019-12-08 ENCOUNTER — APPOINTMENT (OUTPATIENT)
Dept: GENERAL RADIOLOGY | Facility: HOSPITAL | Age: 74
End: 2019-12-08

## 2019-12-08 VITALS
SYSTOLIC BLOOD PRESSURE: 162 MMHG | OXYGEN SATURATION: 98 % | HEIGHT: 73 IN | DIASTOLIC BLOOD PRESSURE: 78 MMHG | TEMPERATURE: 97.6 F | HEART RATE: 76 BPM | WEIGHT: 251.32 LBS | BODY MASS INDEX: 33.31 KG/M2 | RESPIRATION RATE: 21 BRPM

## 2019-12-08 DIAGNOSIS — R06.00 DYSPNEA, UNSPECIFIED TYPE: ICD-10-CM

## 2019-12-08 DIAGNOSIS — R07.9 CHEST PAIN, UNSPECIFIED TYPE: Primary | ICD-10-CM

## 2019-12-08 LAB
D DIMER PPP FEU-MCNC: 0.35 MCGFEU/ML (ref 0.17–0.59)
DACRYOCYTES BLD QL SMEAR: ABNORMAL
DEPRECATED RDW RBC AUTO: 49.4 FL (ref 37–54)
ELLIPTOCYTES BLD QL SMEAR: ABNORMAL
ERYTHROCYTE [DISTWIDTH] IN BLOOD BY AUTOMATED COUNT: 15.2 % (ref 12.3–15.4)
HCT VFR BLD AUTO: 43.9 % (ref 37.5–51)
HGB BLD-MCNC: 15.3 G/DL (ref 13–17.7)
LARGE PLATELETS: ABNORMAL
LYMPHOCYTES # BLD MANUAL: 1.38 10*3/MM3 (ref 0.7–3.1)
LYMPHOCYTES NFR BLD MANUAL: 10 % (ref 19.6–45.3)
LYMPHOCYTES NFR BLD MANUAL: 3 % (ref 5–12)
MCH RBC QN AUTO: 32.1 PG (ref 26.6–33)
MCHC RBC AUTO-ENTMCNC: 34.8 G/DL (ref 31.5–35.7)
MCV RBC AUTO: 92.2 FL (ref 79–97)
MONOCYTES # BLD AUTO: 0.41 10*3/MM3 (ref 0.1–0.9)
NEUTROPHILS # BLD AUTO: 11.59 10*3/MM3 (ref 1.7–7)
NEUTROPHILS NFR BLD MANUAL: 82 % (ref 42.7–76)
NEUTS BAND NFR BLD MANUAL: 2 % (ref 0–5)
PLATELET # BLD AUTO: 292 10*3/MM3 (ref 140–450)
PMV BLD AUTO: 7.9 FL (ref 6–12)
POLYCHROMASIA BLD QL SMEAR: ABNORMAL
RBC # BLD AUTO: 4.76 10*6/MM3 (ref 4.14–5.8)
SCAN SLIDE: NORMAL
TROPONIN T SERPL-MCNC: <0.01 NG/ML (ref 0–0.03)
VARIANT LYMPHS NFR BLD MANUAL: 3 % (ref 0–5)
WBC MORPH BLD: NORMAL
WBC NRBC COR # BLD: 13.8 10*3/MM3 (ref 3.4–10.8)

## 2019-12-08 PROCEDURE — 84484 ASSAY OF TROPONIN QUANT: CPT | Performed by: EMERGENCY MEDICINE

## 2019-12-08 PROCEDURE — 71045 X-RAY EXAM CHEST 1 VIEW: CPT

## 2019-12-08 PROCEDURE — 93005 ELECTROCARDIOGRAM TRACING: CPT | Performed by: EMERGENCY MEDICINE

## 2019-12-08 PROCEDURE — 85025 COMPLETE CBC W/AUTO DIFF WBC: CPT | Performed by: EMERGENCY MEDICINE

## 2019-12-08 PROCEDURE — 99284 EMERGENCY DEPT VISIT MOD MDM: CPT

## 2019-12-08 PROCEDURE — 85007 BL SMEAR W/DIFF WBC COUNT: CPT | Performed by: EMERGENCY MEDICINE

## 2019-12-08 PROCEDURE — 85379 FIBRIN DEGRADATION QUANT: CPT | Performed by: EMERGENCY MEDICINE

## 2019-12-08 RX ORDER — SODIUM CHLORIDE 0.9 % (FLUSH) 0.9 %
10 SYRINGE (ML) INJECTION AS NEEDED
Status: DISCONTINUED | OUTPATIENT
Start: 2019-12-08 | End: 2019-12-08 | Stop reason: HOSPADM

## 2019-12-08 NOTE — ED NOTES
C/O Shortness of breath that began Saturday night. Pt reports he was admitted Friday 12/6 with chest pains, heart cath with one stent placement.     Yulia Edmonds RN  12/08/19 1053

## 2019-12-08 NOTE — ED PROVIDER NOTES
Subjective   Patient is a 74-year-old male complaining of pain in his upper back earlier today it lasted several minutes at a time.  This happened several occasions.  He has some mild associated shortness of breath when that did occur.  He states he is pain-free at this time.  He denies cough fever chest pain shortness of breath arm diarrhea dysuria or other complaint.          Review of Systems  For headache years of cough fever chest pain vomiting bombarded dysuria weakness weight loss or other complaint  Past Medical History:   Diagnosis Date   • Bruises easily    • CHF (congestive heart failure) (CMS/HCC)    • Congenital heart defect    • Difficulty attaining erection    • Hypertension    • Low back pain    • Poor circulation    • Prostate cancer (CMS/HCC)    • Shortness of breath        Allergies   Allergen Reactions   • Haloperidol Hives   • Morphine Itching   • Pantoprazole Other (See Comments)     Feels sick after receiving       Past Surgical History:   Procedure Laterality Date   • APPENDECTOMY     • CARDIAC CATHETERIZATION N/A 12/5/2019    Procedure: Left Heart Cath;  Surgeon: Mirza Munson MD;  Location: [facility] CATH INVASIVE LOCATION;  Service: Cardiology   • CARDIAC CATHETERIZATION N/A 12/5/2019    Procedure: Stent MICHAEL coronary;  Surgeon: Mirza Munson MD;  Location: [facility] CATH INVASIVE LOCATION;  Service: Cardiology   • CHOLECYSTECTOMY     • ELBOW PROCEDURE      left   • HERNIA REPAIR     • INTERVENTIONAL RADIOLOGY PROCEDURE N/A 12/5/2019    Procedure: Intravascular Ultrasound;  Surgeon: Mirza Munson MD;  Location: [facility] CATH INVASIVE LOCATION;  Service: Cardiology   • IA RT/LT HEART CATHETERS N/A 12/5/2019    Procedure: Percutaneous Coronary Intervention;  Surgeon: Mirza Munson MD;  Location: [facility] CATH INVASIVE LOCATION;  Service: Cardiology       Family History   Problem Relation Age of Onset   • Alzheimer's disease Mother    • GI problems  Father    • Heart disease Father        Social History     Socioeconomic History   • Marital status:      Spouse name: Not on file   • Number of children: Not on file   • Years of education: Not on file   • Highest education level: Not on file   Tobacco Use   • Smoking status: Former Smoker   • Smokeless tobacco: Never Used   Substance and Sexual Activity   • Alcohol use: Yes     Frequency: Never     Comment: socially    • Drug use: No   • Sexual activity: Defer           Objective   Physical Exam  HEENT exam shows TMs to be clear but oropharynx, spit sclerae nonicteric.  Neck has no adenopathy JVD or bruits.  Lungs are clear.  Heart has regular rate rhythm without murmur rub or gallop her chest is nontender.  Abdomen is soft nontender.  Patient has normal bowel sounds without rebound or guarding.  Back has no CVA tenderness.  Extremity exam is no cyanosis or edema.  Procedures     My EKG interpretation shows a paced rhythm.      ED Course      Results for orders placed or performed during the hospital encounter of 12/08/19   D-dimer, Quantitative   Result Value Ref Range    D-Dimer, Quantitative 0.35 0.17 - 0.59 MCGFEU/mL   Troponin   Result Value Ref Range    Troponin T <0.010 0.000 - 0.030 ng/mL   CBC Auto Differential   Result Value Ref Range    WBC 13.80 (H) 3.40 - 10.80 10*3/mm3    RBC 4.76 4.14 - 5.80 10*6/mm3    Hemoglobin 15.3 13.0 - 17.7 g/dL    Hematocrit 43.9 37.5 - 51.0 %    MCV 92.2 79.0 - 97.0 fL    MCH 32.1 26.6 - 33.0 pg    MCHC 34.8 31.5 - 35.7 g/dL    RDW 15.2 12.3 - 15.4 %    RDW-SD 49.4 37.0 - 54.0 fl    MPV 7.9 6.0 - 12.0 fL    Platelets 292 140 - 450 10*3/mm3   Scan Slide   Result Value Ref Range    Scan Slide     Manual Differential   Result Value Ref Range    Neutrophil % 82.0 (H) 42.7 - 76.0 %    Lymphocyte % 10.0 (L) 19.6 - 45.3 %    Monocyte % 3.0 (L) 5.0 - 12.0 %    Bands %  2.0 0.0 - 5.0 %    Atypical Lymphocyte % 3.0 0.0 - 5.0 %    Neutrophils Absolute 11.59 (H) 1.70 - 7.00  10*3/mm3    Lymphocytes Absolute 1.38 0.70 - 3.10 10*3/mm3    Monocytes Absolute 0.41 0.10 - 0.90 10*3/mm3    Dacrocytes Slight/1+ None Seen    Elliptocytes Mod/2+ None Seen    Polychromasia Slight/1+ None Seen    WBC Morphology Normal Normal    Large Platelets Slight/1+ None Seen     Xr Chest 1 View    Result Date: 12/8/2019   1. No acute disease in the chest.  Electronically Signed By-DR. Fabio Wong MD On:12/8/2019 6:08 PM This report was finalized on [id number] by DR. Fabio Wong MD.                    No data recorded                        MDM  Number of Diagnoses or Management Options  Diagnosis management comments: Patient has a benign physical exam.  Is no evidence of acute medicine based on EKG and troponin.  Chest x-ray shows no acute bone disease.  Metabolic panel is normal.  There is no evidence of infectious process.  Patient is pain-free throughout his ED visit.  He will be discharged to follow with his MD for further evaluation as needed.    Risk of Complications, Morbidity, and/or Mortality  Presenting problems: high  Diagnostic procedures: high  Management options: high    Patient Progress  Patient progress: stable      Final diagnoses:   Chest pain, unspecified type   Dyspnea, unspecified type              Sanchez Lloyd MD  12/08/19 0368

## 2019-12-09 ENCOUNTER — READMISSION MANAGEMENT (OUTPATIENT)
Dept: CALL CENTER | Facility: HOSPITAL | Age: 74
End: 2019-12-09

## 2019-12-09 NOTE — OUTREACH NOTE
COPD/PN Week 1 Survey      Responses   Facility patient discharged from?  [facility]   Does the patient have one of the following disease processes/diagnoses(primary or secondary)?  COPD/Pneumonia   Is there a successful TCM telephone encounter documented?  No   Was the primary reason for admission:  COPD exacerbation   Week 1 attempt successful?  No   Unsuccessful attempts  Attempt 4 [No answer/Voicemail not set up]          Lali Sahu LPN

## 2019-12-16 ENCOUNTER — READMISSION MANAGEMENT (OUTPATIENT)
Dept: CALL CENTER | Facility: HOSPITAL | Age: 74
End: 2019-12-16

## 2019-12-16 NOTE — OUTREACH NOTE
COPD/PN Week 2 Survey      Responses   Facility patient discharged from?  [facility]   Does the patient have one of the following disease processes/diagnoses(primary or secondary)?  COPD/Pneumonia   Was the primary reason for admission:  COPD exacerbation   Week 2 attempt successful?  No   Unsuccessful attempts  Attempt 1          Adrienne Nascimento RN

## 2019-12-17 ENCOUNTER — READMISSION MANAGEMENT (OUTPATIENT)
Dept: CALL CENTER | Facility: HOSPITAL | Age: 74
End: 2019-12-17

## 2019-12-17 PROBLEM — Z95.5 STENTED CORONARY ARTERY: Status: ACTIVE | Noted: 2019-12-05

## 2019-12-17 NOTE — OUTREACH NOTE
COPD/PN Week 2 Survey      Responses   Facility patient discharged from?  [facility]   Does the patient have one of the following disease processes/diagnoses(primary or secondary)?  COPD/Pneumonia   Was the primary reason for admission:  COPD exacerbation   Week 2 attempt successful?  Yes   Call start time  1706   Call end time  1711   Discharge diagnosis  COPD exac   Is patient permission given to speak with other caregiver?  Yes   List who call center can speak with  LONI SIDHU    Person spoke with today (if not patient) and relationship  LONI SIDHU- WIFE    Meds reviewed with patient/caregiver?  Yes   Is the patient having any side effects they believe may be caused by any medication additions or changes?  No   Does the patient have all medications ordered at discharge?  Yes   Is the patient taking all medications as directed (includes completed medication regime)?  Yes   Comments regarding appointments  PATIENT HAS APPT WITH DR. LUNDBERG, CARDIOLOGIST, TOMORROW   Does the patient have a primary care provider?   Yes   Does the patient have an appointment with their PCP or pulmonologist within 7 days of discharge?  No   Comments regarding PCP  WIFE STATES DR. CALLES IS OUT OF TOWN, BUT SHE WILL MAKE PATIENT AN APPT WITH ONE OF THE NURSE PRACTIONERS IN THE OFFICE.    Has the patient kept scheduled appointments due by today?  N/A   Has home health visited the patient within 72 hours of discharge?  N/A   Did the patient receive a copy of their discharge instructions?  Yes   Nursing interventions  Reviewed instructions with patient   What is the patient's perception of their health status since discharge?  Improving   Is the patient/caregiver able to teach back the hierarchy of who to call/visit for symptoms/problems? PCP, Specialist, Home health nurse, Urgent Care, ED, 911  Yes   Is the patient able to teach back COPD zones?  Yes   Nursing interventions  Education provided on various zones   Patient reports what zone on  this call?  Green Zone   Green [facility]  Reports doing well, Breathing without shortness of breath, Usual activity and exercise level, Usual amount of phlegm/mucus without difficulty coughing up, Sleeping well, Appetite is good   [facility] interventions:  Take daily medications, Avoid indoor/outdoor triggers, Continue regular exercise/diet plan   Week 2 call completed?  Yes          Kayleigh Back LPN

## 2019-12-18 ENCOUNTER — TELEPHONE (OUTPATIENT)
Dept: CARDIOLOGY | Facility: CLINIC | Age: 74
End: 2019-12-18

## 2019-12-18 ENCOUNTER — OFFICE VISIT (OUTPATIENT)
Dept: CARDIOLOGY | Facility: CLINIC | Age: 74
End: 2019-12-18

## 2019-12-18 VITALS
BODY MASS INDEX: 33.56 KG/M2 | SYSTOLIC BLOOD PRESSURE: 118 MMHG | HEART RATE: 79 BPM | HEIGHT: 73 IN | RESPIRATION RATE: 18 BRPM | DIASTOLIC BLOOD PRESSURE: 60 MMHG | WEIGHT: 253.2 LBS

## 2019-12-18 DIAGNOSIS — I20.0 UNSTABLE ANGINA (HCC): ICD-10-CM

## 2019-12-18 DIAGNOSIS — Z95.5 STENTED CORONARY ARTERY: ICD-10-CM

## 2019-12-18 DIAGNOSIS — I48.19 OTHER PERSISTENT ATRIAL FIBRILLATION (HCC): Primary | ICD-10-CM

## 2019-12-18 DIAGNOSIS — I25.10 CORONARY ARTERY DISEASE INVOLVING NATIVE CORONARY ARTERY OF NATIVE HEART WITHOUT ANGINA PECTORIS: ICD-10-CM

## 2019-12-18 DIAGNOSIS — Z95.0 PRESENCE OF CARDIAC PACEMAKER: ICD-10-CM

## 2019-12-18 PROCEDURE — 93010 ELECTROCARDIOGRAM REPORT: CPT | Performed by: INTERNAL MEDICINE

## 2019-12-18 PROCEDURE — 99214 OFFICE O/P EST MOD 30 MIN: CPT | Performed by: INTERNAL MEDICINE

## 2019-12-18 PROCEDURE — 93000 ELECTROCARDIOGRAM COMPLETE: CPT | Performed by: INTERNAL MEDICINE

## 2019-12-18 RX ORDER — PRASUGREL 10 MG/1
TABLET, FILM COATED ORAL
Qty: 35 TABLET | Refills: 5 | Status: SHIPPED | OUTPATIENT
Start: 2019-12-18 | End: 2020-06-08

## 2019-12-18 NOTE — PROGRESS NOTES
Cardiology clinic note  Joshua Yip MD, PhD  [facility] cardiology    Subjective:     Encounter Date:12/18/2019      Patient ID: Ro Nathan is a 74 y.o. male.    Chief Complaint:  Chief Complaint   Patient presents with   • Follow-up     Hospital Follow Up       HPI:  History of Present Illness  I had the pleasure to see this very pleasant 74-year-old male who is a patient of Dr. Yusuf Jones MD primary care who was recently seen in the hospital for ongoing shortness of air possible anginal equivalent with known coronary disease.  Ultimately he underwent his invasive ischemic evaluation with left heart catheterization finding in-stent restenosis in segment restenosis of the LAD receiving drug-eluting stent placement after cutting balloon angioplasty, placement of Xience 3.5 x 33 mm drug-eluting stent to the LAD. Residual disease in the circumflex and OM distribution as well as RCA with 50% ISR left to be treated medically at that time.  He has underlying normal LV systolic function.  His symptoms of shortness of air have persisted despite coronary intervention and exacerbation of bronchitis and airway disease with known underlying COPD.  He does not complain of palpitations heart failure signs or symptoms or chest pain overtly just overall feeling very fatigued and exertionally short of air NYHA class II-III symptoms.  On Monday he says he felt dramatically better and is felt better over the last couple of days notably.  No other concerning signs or symptoms today.  No heart failure signs or symptoms.  He is chest pain-free.  We discussed the indications for further stenting and FFR/IFR of residual lesions and at this time I would prefer to medically manage him with risk factor reduction with diet exercise, weight loss, high intensity statins, continuation of dual antiplatelet therapy which minimize recurrent MI risk which does not appear to be diminished by coronary intervention  according to recent trial data.  He is amenable to this conservative approach presently.  He has normal underlying LV structure and function.    Review of systems on 14 point review of system negative except was mentioned above    Historical data copied forward from previous encounters and EMR is unchanged          Indication for procedure:     1.  Unstable angina  2.  Known coronary artery disease status post stenting to the LAD circumflex and right coronary artery in the past   3. multiple coronary artery risk factors     Procedures performed:     1.  Native coronary arteriography  2.  Left Heart catheterization  3.  Left ventriculography  4.  Intravascular ultrasound guided PCI to the LAD  5.  Percutaneous coronary intervention involving drug-eluting stent placement to the ostial proximal LAD (3.5 x 33 mm Xience drug-eluting stent)     Description of procedure:     Patient had the risks and benefits of the procedure explained to them in detail after which informed consent was obtained.  Patient was then brought to the cardiac catheterization lab and placed on the table in supine position.  Next the right groin was prepped and draped in sterile fashion.  Next using modified Seldinger technique and a 21-gauge micro puncture needle, the femoral artery was cannulated without difficulty and a 6 Ghanaian was inserted and flushed with heparinized saline.  Next using diagnostic 6 Ghanaian JR4 and JL4 catheters, each advanced over an 0.035 guidewire to the level the aortic root and then used to selectively engage the respective coronary ostia, multiple cineangiographic images were obtained all the appropriate projections using hand-injection of nonionic contrast sterile.  Before removal of the JR4 catheter the JR4 catheter used across the aortic valve in the left ventricle for left heart catheterization left ventriculography.  The catheter was then pulled back and removed.     The culprit was identified is an 80% focal  in-stent restenotic lesion in the proximal LAD despite an additional severe lesion in the second obtuse marginal branch of the circumflex artery.  Therefore we decided to focus on the LAD lesion.  Using a 6 Martiniquais EBU 4 guide catheter and a whisper export wire we wired the LAD with little difficulty.  We then performed intravascular ultrasound guided evaluation of the ostial left main and LAD lesion and also to determine reference vessel size diameter.  Following intravascular ultrasound we proceeded with predilatation of the LAD using a 3.5 x 10 mm [place] cutting balloon in the mid proximal and proximal in-stent restenotic lesion with inflation to a maximum of 18 xin within the stented segment.  The lesion was reduced to 30%.  Stenting was then performed of the angioplastied segment using a 3.5 x 33 mm Xience drug-eluting stent deployed at 14 xin.  Postdilatation was performed along the vessel with a 3.5 mm NC trek balloon given the reference vessel diameter of 3.5 mm to 2 greater than 3.5 mm with 18 xin of pressure increasing to 20 xin of pressure in the ostial proximal portion of the stented segment.  Post stenting intravascular ultrasound revealed no edge dissection with excellent stent apposition and 0% residual stenosis.  We achieved THOR-3 flow prior and THOR-3 flow following PCI.  The guide catheter and wire were then removed     Patient tolerated the procedure well there were no complications.     During the case, conscious sedation monitoring was more than 30 min.     At the end of the case a 6 Martiniquais Gaby device was deployed in the right groin for right groin hemostasis     Findings     Left heart catheterization:     1.  The opening aortic pressure was 181/81 mmHg with a mean pressure of 119 mmHg  2.  The left ventricular end-diastolic pressure at end expiration was 4 to 8 mmHg  3.  There is no gradient across aortic valve on pullback     Left ventriculography     The overall estimated left  ventricular ejection fraction was 65%.     Native coronary arteriography: Left dominant circulation     1.  Left main coronary arteries a large-caliber vessel gives rise left anterior descending left circumflex flex arteries.  There is an ostial eccentric 40% lesion that appears angiographically to approach 50%. No significant coronary atherosclerotic disease present.  2.  Left anterior descending artery is a large-caliber vessel that gives rise to large caliber diagonal branches and courses along the anterior interventricular groove and wraps around the apex.  There is a proximal eccentric 50% lesion within an in-stent restenotic segment followed by an greater than 80% focal eccentric in-stent restenotic lesion followed by a 60% lesion after the stented segment and otherwise no coronary atherosclerotic disease of any significance present.  3.  The left circumflex arteries a large-caliber vessel gives rise to large caliber bifurcating obtuse marginal branch.  There is an ostial proximal concentric 50% lesion that is calcified that immediately gives rise to a very high first obtuse marginal branch that is best classified as a ramus intermedius branch supplying large territory in the anterolateral wall that has an ostial proximal 70% lesion.  The calcified continuing circumflex and the posterior AV groove gives a second obtuse marginal branch that has mild diffuse disease, a third obtuse marginal branch and then a bifurcating system that has a bifurcation stenting within it before giving rise to the left PDA.  There is diffuse 50% in-stent restenosis within this bifurcation stented segment into the obtuse marginal branch which itself has diffuse 50 to 60% disease within the stented segment.  There is a concentric 80% lesion in the circumflex PDA.  No significant coronary atherosclerotic disease present  4.  The right coronary arteries a large-caliber nondominant vessel has a stented mid segment after which there  several acute marginal branches there is 50% in-stent restenosis approaching 60% at some portions within this nondominant right proximal to mid proximal segment.     Conclusions:     1.  Normal left heart filling pressures with preserved LV systolic function  2.  Normal epicardial anatomy with severe two-vessel coronary artery disease involving what is likely the culprit for the patient's chest pain syndrome, namely the very severe proximal LAD lesions.  We will treat the remaining circumflex lesions with medical therapy and see if the patient's symptoms resolved; this includes the severe lesion within the proximal portion of the high obtuse marginal branch within the circumflex system as well.     Recommendations:     Optimize medical therapy for secondary prevention of ischemic heart disease.          The following portions of the patient's history were reviewed and updated as appropriate: allergies, current medications, past family history, past medical history, past social history, past surgical history and problem list.    Problem List:  Patient Active Problem List   Diagnosis   • Skin ulcer (CMS/HCC)   • Chronic low back pain   • DDD (degenerative disc disease), lumbar   • Spondylosis of lumbosacral region   • Atrial fibrillation (CMS/HCC)   • Chronic obstructive pulmonary disease (CMS/[facility])   • Coronary artery disease   • Gastroesophageal reflux disease   • Hyperlipidemia   • Hypertension   • Lumbar radiculopathy   • Other hammer toe(s) (acquired), right foot   • Presence of cardiac pacemaker   • Status post coronary artery stent placement   • Lumbar facet joint pain   • COPD exacerbation (CMS/[facility])   • COPD (chronic obstructive pulmonary disease) (CMS/HCC)   • Unstable angina (CMS/[facility])   • Stented coronary artery       Past Medical History:  Past Medical History:   Diagnosis Date   • Bruises easily    • CHF (congestive heart failure) (CMS/[facility])    • Congenital heart defect    • Difficulty attaining erection   "  • Hypertension    • Low back pain    • Poor circulation    • Prostate cancer (CMS/HCC)    • Shortness of breath    • Stented coronary artery 12/05/2019    MICHAEL to LAD       Past Surgical History:  Past Surgical History:   Procedure Laterality Date   • APPENDECTOMY     • CARDIAC CATHETERIZATION N/A 12/5/2019    Procedure: Left Heart Cath;  Surgeon: Mirza Munson MD;  Location: [facility] CATH INVASIVE LOCATION;  Service: Cardiology   • CARDIAC CATHETERIZATION N/A 12/5/2019    Procedure: Stent MICHAEL coronary;  Surgeon: Mirza Munson MD;  Location: [facility] CATH INVASIVE LOCATION;  Service: Cardiology   • CHOLECYSTECTOMY     • ELBOW PROCEDURE      left   • HERNIA REPAIR     • INTERVENTIONAL RADIOLOGY PROCEDURE N/A 12/5/2019    Procedure: Intravascular Ultrasound;  Surgeon: Mirza Munson MD;  Location: [facility] CATH INVASIVE LOCATION;  Service: Cardiology   • NH RT/LT HEART CATHETERS N/A 12/5/2019    Procedure: Percutaneous Coronary Intervention;  Surgeon: Mirza Munson MD;  Location: [facility] CATH INVASIVE LOCATION;  Service: Cardiology       Social History:  Social History     Socioeconomic History   • Marital status:      Spouse name: Not on file   • Number of children: Not on file   • Years of education: Not on file   • Highest education level: Not on file   Tobacco Use   • Smoking status: Former Smoker   • Smokeless tobacco: Never Used   Substance and Sexual Activity   • Alcohol use: Yes     Frequency: Never     Comment: socially    • Drug use: No   • Sexual activity: Defer       Allergies:  Allergies   Allergen Reactions   • Haloperidol Hives   • Morphine Itching   • Pantoprazole Other (See Comments)     Feels sick after receiving       Immunizations:    There is no immunization history on file for this patient.    ROS:  ROS       Objective:         /60 (BP Location: Left arm, Patient Position: Sitting)   Pulse 79   Resp 18   Ht 185.4 cm (73\")   Wt 115 kg " (253 lb 3.2 oz)   BMI 33.41 kg/m²     Physical Exam  Vitals reviewed  Normocephalic atraumatic pupils equal round extraocular movement intact bilaterally  Trachea midline neck supple no carotid bruits no JVD  No clubbing cyanosis or edema  Irregularly irregular no rubs murmurs gallops, no heave no lift  Clear to auscultation bilaterally  Abdomen obese soft nontender nondistended bowel sounds positive  No gross neurologic deficits  Normal mood and affect  No bony abnormalities grossly  Normal cap refill normal pulses peripherally    In-Office Procedure(s):    ECG 12 Lead  Date/Time: 12/18/2019 5:48 PM  Performed by: Joshua Yip MD  Authorized by: Joshua Yip MD   Comparison: not compared with previous ECG   Previous ECG: no previous ECG available  Rate: normal  QRS axis: left  Pacing: ventricular paced rhythm  Clinical impression: abnormal EKG  Comments: Underlying atrial fibrillation, ventricularly paced rhythm, no in other interpretation able            ASCVD RIsk Score::  The 10-year ASCVD risk score (Yajairakrishan KLEIN Jr., et al., 2013) is: 20.7%    Values used to calculate the score:      Age: 74 years      Sex: Male      Is Non- : No      Diabetic: No      Tobacco smoker: No      Systolic Blood Pressure: 118 mmHg      Is BP treated: Yes      HDL Cholesterol: 48 mg/dL      Total Cholesterol: 160 mg/dL    Recent Radiology:  Imaging Results (Most Recent)     None          Lab Review:   Admission on 12/08/2019, Discharged on 12/08/2019   Component Date Value   • D-Dimer, Quantitative 12/08/2019 0.35    • Troponin T 12/08/2019 <0.010    • WBC 12/08/2019 13.80*   • RBC 12/08/2019 4.76    • Hemoglobin 12/08/2019 15.3    • Hematocrit 12/08/2019 43.9    • MCV 12/08/2019 92.2    • MCH 12/08/2019 32.1    • MCHC 12/08/2019 34.8    • RDW 12/08/2019 15.2    • RDW-SD 12/08/2019 49.4    • MPV 12/08/2019 7.9    • Platelets 12/08/2019 292    • Scan Slide 12/08/2019     • Neutrophil %  12/08/2019 82.0*   • Lymphocyte % 12/08/2019 10.0*   • Monocyte % 12/08/2019 3.0*   • Bands %  12/08/2019 2.0    • Atypical Lymphocyte % 12/08/2019 3.0    • Neutrophils Absolute 12/08/2019 11.59*   • Lymphocytes Absolute 12/08/2019 1.38    • Monocytes Absolute 12/08/2019 0.41    • Dacrocytes 12/08/2019 Slight/1+    • Elliptocytes 12/08/2019 Mod/2+    • Polychromasia 12/08/2019 Slight/1+    • WBC Morphology 12/08/2019 Normal    • Large Platelets 12/08/2019 Slight/1+    No results displayed because visit has over 200 results.                   Assessment:          Diagnosis Plan   1. Other persistent atrial fibrillation  ECG 12 Lead   2. Unstable angina (CMS/HCC)     3. Coronary artery disease involving native coronary artery of native heart without angina pectoris     4. Presence of cardiac pacemaker     5. Stented coronary artery            Plan:        CAD with recent PCI to LAD with Xience drug-eluting stent 3.5 x 33  Dual any platelet therapy, refill pressor grill today as he has been off this medicine for some unknown reason  Uninterrupted DAPT for 1 year  High intensity statin therapy  Beta-blocker on board  Started intervention for residual disease advanced medical therapy, staged IFR of any residual lesions for recurrent symptoms or non-resolving dyspnea on exertion  Dyspnea on exertion multifactorial with underlying severe pulmonary disease and deconditioning    Persistent atrial fibrillation  Off anticoagulation secondary to previously severe GI bleeding  Continue dual antiplatelet therapy which will provide some stroke risk reduction  Presently rate controlled on beta-blocker    Hypertension controlled continue present regimen  Hyperlipidemia high intensity statin  COPD, finished prednisone, doxycycline continued to completion    Return to clinic in 3 months for symptom assessment and will defer any invasive staged intervention until then  Encourage continued ambulation as well as diet and exercise and  weight loss and caloric restriction per guidelines    It is a pleasure to be involved in his cardiovascular care.  Please call with any questions or concerns  Joshua Yip MD, PhD    Greater than 35 minutes spent with the patient today with greater 50% on education on indications for recurrent invasive evaluation, therapeutic options, goal-directed medical therapy and risk factor reduction.             Joshua Yip MD  12/18/19  .

## 2019-12-26 ENCOUNTER — READMISSION MANAGEMENT (OUTPATIENT)
Dept: CALL CENTER | Facility: HOSPITAL | Age: 74
End: 2019-12-26

## 2019-12-26 NOTE — OUTREACH NOTE
COPD/PN Week 3 Survey      Responses   Facility patient discharged from?  [facility]   Does the patient have one of the following disease processes/diagnoses(primary or secondary)?  COPD/Pneumonia   Was the primary reason for admission:  COPD exacerbation   Week 3 attempt successful?  Yes   Call start time  1307   Call end time  0109   Discharge diagnosis  COPD exac   Is patient permission given to speak with other caregiver?  Yes   Person spoke with today (if not patient) and relationship  LONI SIDHU- WIFE    Meds reviewed with patient/caregiver?  Yes   Is the patient having any side effects they believe may be caused by any medication additions or changes?  No   Does the patient have all medications ordered at discharge?  Yes   Is the patient taking all medications as directed (includes completed medication regime)?  Yes   Does the patient have a primary care provider?   Yes   Does the patient have an appointment with their PCP or pulmonologist within 7 days of discharge?  Yes   Has the patient kept scheduled appointments due by today?  Yes   Has home health visited the patient within 72 hours of discharge?  N/A   Psychosocial issues?  No   Did the patient receive a copy of their discharge instructions?  Yes   Nursing interventions  Reviewed instructions with patient   What is the patient's perception of their health status since discharge?  Improving   Nursing Interventions  Nurse provided patient education   Are the patient's immunizations up to date?   Yes   Nursing interventions  Educated on importance of maintaining up to date immunizations as advised by provider   Is the patient/caregiver able to teach back the hierarchy of who to call/visit for symptoms/problems? PCP, Specialist, Home health nurse, Urgent Care, ED, 911  Yes   Is the patient able to teach back COPD zones?  Yes   Nursing interventions  Education provided on various zones   Patient reports what zone on this call?  Green Zone   Green Zone  Reports  doing well, Breathing without shortness of breath, Usual activity and exercise level, Usual amount of phlegm/mucus without difficulty coughing up, Sleeping well, Appetite is good   Green Zone interventions:  Take daily medications, Avoid indoor/outdoor triggers, Continue regular exercise/diet plan   Week 3 call completed?  Yes          Rodney Robles RN

## 2020-01-08 PROCEDURE — 93010 ELECTROCARDIOGRAM REPORT: CPT | Performed by: INTERNAL MEDICINE

## 2020-01-23 ENCOUNTER — TRANSCRIBE ORDERS (OUTPATIENT)
Dept: ADMINISTRATIVE | Facility: HOSPITAL | Age: 75
End: 2020-01-23

## 2020-01-23 DIAGNOSIS — R18.8 OTHER ASCITES: Primary | ICD-10-CM

## 2020-01-27 ENCOUNTER — TELEPHONE (OUTPATIENT)
Dept: PAIN MEDICINE | Facility: CLINIC | Age: 75
End: 2020-01-27

## 2020-01-27 ENCOUNTER — HOSPITAL ENCOUNTER (OUTPATIENT)
Dept: INFUSION THERAPY | Facility: HOSPITAL | Age: 75
Discharge: HOME | End: 2020-01-27
Admitting: FAMILY MEDICINE

## 2020-01-27 VITALS
OXYGEN SATURATION: 96 % | DIASTOLIC BLOOD PRESSURE: 50 MMHG | HEART RATE: 70 BPM | RESPIRATION RATE: 16 BRPM | SYSTOLIC BLOOD PRESSURE: 143 MMHG

## 2020-01-27 DIAGNOSIS — R18.8 OTHER ASCITES: Primary | ICD-10-CM

## 2020-01-27 LAB
BASOPHILS # BLD AUTO: 0 10*3/MM3 (ref 0–0.2)
BASOPHILS NFR BLD AUTO: 0.2 % (ref 0–1.5)
DEPRECATED RDW RBC AUTO: 49.9 FL (ref 37–54)
EOSINOPHIL # BLD AUTO: 0.4 10*3/MM3 (ref 0–0.4)
EOSINOPHIL NFR BLD AUTO: 5 % (ref 0.3–6.2)
ERYTHROCYTE [DISTWIDTH] IN BLOOD BY AUTOMATED COUNT: 15.3 % (ref 12.3–15.4)
HCT VFR BLD AUTO: 41.7 % (ref 37.5–51)
HGB BLD-MCNC: 14.9 G/DL (ref 13–17.7)
INR PPP: 1.02 (ref 0.9–1.1)
LYMPHOCYTES # BLD AUTO: 1.3 10*3/MM3 (ref 0.7–3.1)
LYMPHOCYTES NFR BLD AUTO: 14.8 % (ref 19.6–45.3)
MCH RBC QN AUTO: 33 PG (ref 26.6–33)
MCHC RBC AUTO-ENTMCNC: 35.8 G/DL (ref 31.5–35.7)
MCV RBC AUTO: 92.2 FL (ref 79–97)
MONOCYTES # BLD AUTO: 0.7 10*3/MM3 (ref 0.1–0.9)
MONOCYTES NFR BLD AUTO: 8.4 % (ref 5–12)
NEUTROPHILS # BLD AUTO: 6.2 10*3/MM3 (ref 1.7–7)
NEUTROPHILS NFR BLD AUTO: 71.6 % (ref 42.7–76)
NRBC BLD AUTO-RTO: 0.1 /100 WBC (ref 0–0.2)
PLATELET # BLD AUTO: 298 10*3/MM3 (ref 140–450)
PMV BLD AUTO: 7.9 FL (ref 6–12)
PROTHROMBIN TIME: 10.7 SECONDS (ref 9.6–11.7)
RBC # BLD AUTO: 4.52 10*6/MM3 (ref 4.14–5.8)
WBC NRBC COR # BLD: 8.7 10*3/MM3 (ref 3.4–10.8)

## 2020-01-27 PROCEDURE — G0463 HOSPITAL OUTPT CLINIC VISIT: HCPCS

## 2020-01-27 PROCEDURE — 85610 PROTHROMBIN TIME: CPT | Performed by: FAMILY MEDICINE

## 2020-01-27 PROCEDURE — 85027 COMPLETE CBC AUTOMATED: CPT | Performed by: FAMILY MEDICINE

## 2020-01-27 PROCEDURE — 36415 COLL VENOUS BLD VENIPUNCTURE: CPT

## 2020-01-27 RX ORDER — ALBUMIN (HUMAN) 12.5 G/50ML
12.5 SOLUTION INTRAVENOUS DAILY PRN
Status: CANCELLED | OUTPATIENT
Start: 2020-01-27

## 2020-01-27 RX ORDER — ALBUMIN (HUMAN) 12.5 G/50ML
25 SOLUTION INTRAVENOUS DAILY PRN
Status: CANCELLED | OUTPATIENT
Start: 2020-01-27

## 2020-01-27 NOTE — CODE DOCUMENTATION
Dr. Gambino had patient roll on his right side and his left side to check for fluid with ultrasound, but no fluid was seen.  No attempt was made to perform a paracentesis.  Dr. Gambino encouraged patient to check with Dr. Jones on getting a CT scan done to check for other issues.  Patient with verbal understanding.  Pt. States follow up this week with Dr. Jones

## 2020-01-30 ENCOUNTER — OFFICE VISIT (OUTPATIENT)
Dept: PAIN MEDICINE | Facility: CLINIC | Age: 75
End: 2020-01-30

## 2020-01-30 VITALS
WEIGHT: 250 LBS | HEART RATE: 74 BPM | HEIGHT: 74 IN | BODY MASS INDEX: 32.08 KG/M2 | OXYGEN SATURATION: 96 % | RESPIRATION RATE: 16 BRPM | TEMPERATURE: 97.5 F | DIASTOLIC BLOOD PRESSURE: 93 MMHG | SYSTOLIC BLOOD PRESSURE: 179 MMHG

## 2020-01-30 DIAGNOSIS — M54.50 CHRONIC MIDLINE LOW BACK PAIN WITHOUT SCIATICA: Primary | ICD-10-CM

## 2020-01-30 DIAGNOSIS — M54.16 LUMBAR RADICULOPATHY: ICD-10-CM

## 2020-01-30 DIAGNOSIS — M51.36 DDD (DEGENERATIVE DISC DISEASE), LUMBAR: ICD-10-CM

## 2020-01-30 DIAGNOSIS — M47.817 SPONDYLOSIS OF LUMBOSACRAL REGION WITHOUT MYELOPATHY OR RADICULOPATHY: ICD-10-CM

## 2020-01-30 DIAGNOSIS — G89.29 CHRONIC MIDLINE LOW BACK PAIN WITHOUT SCIATICA: Primary | ICD-10-CM

## 2020-01-30 DIAGNOSIS — M54.59 LUMBAR FACET JOINT PAIN: ICD-10-CM

## 2020-01-30 PROCEDURE — 99213 OFFICE O/P EST LOW 20 MIN: CPT | Performed by: PHYSICAL MEDICINE & REHABILITATION

## 2020-01-30 PROCEDURE — G0463 HOSPITAL OUTPT CLINIC VISIT: HCPCS | Performed by: PHYSICAL MEDICINE & REHABILITATION

## 2020-01-30 RX ORDER — DOXYCYCLINE HYCLATE 100 MG
100 TABLET ORAL 2 TIMES DAILY
COMMUNITY
Start: 2019-11-12 | End: 2020-03-19 | Stop reason: HOSPADM

## 2020-01-30 RX ORDER — OXYCODONE AND ACETAMINOPHEN 10; 325 MG/1; MG/1
1 TABLET ORAL EVERY 6 HOURS PRN
Qty: 120 TABLET | Refills: 0 | Status: SHIPPED | OUTPATIENT
Start: 2020-01-30 | End: 2020-01-30 | Stop reason: SDUPTHER

## 2020-01-30 RX ORDER — DIPHENOXYLATE HYDROCHLORIDE AND ATROPINE SULFATE 2.5; .025 MG/1; MG/1
1 TABLET ORAL 4 TIMES DAILY PRN
COMMUNITY
Start: 2019-11-12 | End: 2020-03-19 | Stop reason: HOSPADM

## 2020-01-30 RX ORDER — OXYCODONE AND ACETAMINOPHEN 10; 325 MG/1; MG/1
1 TABLET ORAL EVERY 6 HOURS PRN
Qty: 120 TABLET | Refills: 0 | Status: SHIPPED | OUTPATIENT
Start: 2020-01-30 | End: 2020-04-30 | Stop reason: SDUPTHER
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
## 2020-02-24 ENCOUNTER — OFFICE VISIT (OUTPATIENT)
Dept: CARDIOLOGY | Facility: CLINIC | Age: 75
End: 2020-02-24

## 2020-02-24 VITALS
BODY MASS INDEX: 35.65 KG/M2 | HEIGHT: 73 IN | SYSTOLIC BLOOD PRESSURE: 120 MMHG | HEART RATE: 74 BPM | WEIGHT: 269 LBS | DIASTOLIC BLOOD PRESSURE: 72 MMHG

## 2020-02-24 DIAGNOSIS — Z95.0 PRESENCE OF CARDIAC PACEMAKER: ICD-10-CM

## 2020-02-24 DIAGNOSIS — I48.21 PERMANENT ATRIAL FIBRILLATION (HCC): Primary | ICD-10-CM

## 2020-02-24 PROCEDURE — 93288 INTERROG EVL PM/LDLS PM IP: CPT | Performed by: INTERNAL MEDICINE

## 2020-02-24 PROCEDURE — 99212 OFFICE O/P EST SF 10 MIN: CPT | Performed by: INTERNAL MEDICINE

## 2020-02-24 RX ORDER — POTASSIUM CHLORIDE 750 MG/1
10 CAPSULE, EXTENDED RELEASE ORAL DAILY
COMMUNITY
Start: 2020-02-12 | End: 2020-05-04

## 2020-03-15 ENCOUNTER — HOSPITAL ENCOUNTER (INPATIENT)
Facility: HOSPITAL | Age: 75
LOS: 4 days | Discharge: HOME | End: 2020-03-19
Attending: EMERGENCY MEDICINE | Admitting: FAMILY MEDICINE

## 2020-03-15 ENCOUNTER — APPOINTMENT (OUTPATIENT)
Dept: CT IMAGING | Facility: HOSPITAL | Age: 75
End: 2020-03-15

## 2020-03-15 DIAGNOSIS — R19.8 PERFORATED ABDOMINAL VISCUS: ICD-10-CM

## 2020-03-15 DIAGNOSIS — R10.84 ACUTE GENERALIZED ABDOMINAL PAIN: ICD-10-CM

## 2020-03-15 DIAGNOSIS — K56.50 SMALL BOWEL OBSTRUCTION DUE TO ADHESIONS (HCC): Primary | ICD-10-CM

## 2020-03-15 LAB
ABO GROUP BLD: NORMAL
ALBUMIN SERPL-MCNC: 3.5 G/DL (ref 3.5–5.2)
ALBUMIN SERPL-MCNC: 4.1 G/DL (ref 3.5–5.2)
ALBUMIN/GLOB SERPL: 1.3 G/DL
ALBUMIN/GLOB SERPL: 1.6 G/DL
ALP SERPL-CCNC: 112 U/L (ref 39–117)
ALP SERPL-CCNC: 141 U/L (ref 39–117)
ALT SERPL W P-5'-P-CCNC: 7 U/L (ref 1–41)
ALT SERPL W P-5'-P-CCNC: 8 U/L (ref 1–41)
ANION GAP SERPL CALCULATED.3IONS-SCNC: 12 MMOL/L (ref 5–15)
ANION GAP SERPL CALCULATED.3IONS-SCNC: 7 MMOL/L (ref 5–15)
APTT PPP: 24.5 SECONDS (ref 24–31)
AST SERPL-CCNC: 12 U/L (ref 1–40)
AST SERPL-CCNC: 13 U/L (ref 1–40)
BASOPHILS # BLD AUTO: 0.1 10*3/MM3 (ref 0–0.2)
BASOPHILS NFR BLD AUTO: 0.7 % (ref 0–1.5)
BILIRUB SERPL-MCNC: 1 MG/DL (ref 0.2–1.2)
BILIRUB SERPL-MCNC: 1.3 MG/DL (ref 0.2–1.2)
BLD GP AB SCN SERPL QL: NEGATIVE
BUN BLD-MCNC: 24 MG/DL (ref 8–23)
BUN BLD-MCNC: 29 MG/DL (ref 8–23)
BUN/CREAT SERPL: 18.1 (ref 7–25)
BUN/CREAT SERPL: 19.5 (ref 7–25)
CALCIUM SPEC-SCNC: 8.4 MG/DL (ref 8.6–10.5)
CALCIUM SPEC-SCNC: 9.4 MG/DL (ref 8.6–10.5)
CHLORIDE SERPL-SCNC: 102 MMOL/L (ref 98–107)
CHLORIDE SERPL-SCNC: 97 MMOL/L (ref 98–107)
CO2 SERPL-SCNC: 26 MMOL/L (ref 22–29)
CO2 SERPL-SCNC: 30 MMOL/L (ref 22–29)
CREAT BLD-MCNC: 1.23 MG/DL (ref 0.76–1.27)
CREAT BLD-MCNC: 1.6 MG/DL (ref 0.76–1.27)
DEPRECATED RDW RBC AUTO: 51.2 FL (ref 37–54)
EOSINOPHIL # BLD AUTO: 0.2 10*3/MM3 (ref 0–0.4)
EOSINOPHIL NFR BLD AUTO: 1.7 % (ref 0.3–6.2)
ERYTHROCYTE [DISTWIDTH] IN BLOOD BY AUTOMATED COUNT: 15.7 % (ref 12.3–15.4)
GFR SERPL CREATININE-BSD FRML MDRD: 43 ML/MIN/1.73
GFR SERPL CREATININE-BSD FRML MDRD: 58 ML/MIN/1.73
GLOBULIN UR ELPH-MCNC: 2.2 GM/DL
GLOBULIN UR ELPH-MCNC: 3.1 GM/DL
GLUCOSE BLD-MCNC: 114 MG/DL (ref 65–99)
GLUCOSE BLD-MCNC: 152 MG/DL (ref 65–99)
HCT VFR BLD AUTO: 47 % (ref 37.5–51)
HGB BLD-MCNC: 16.6 G/DL (ref 13–17.7)
INR PPP: 1.09 (ref 0.9–1.1)
LIPASE SERPL-CCNC: 10 U/L (ref 13–60)
LYMPHOCYTES # BLD AUTO: 0.8 10*3/MM3 (ref 0.7–3.1)
LYMPHOCYTES NFR BLD AUTO: 7.4 % (ref 19.6–45.3)
MCH RBC QN AUTO: 32.6 PG (ref 26.6–33)
MCHC RBC AUTO-ENTMCNC: 35.3 G/DL (ref 31.5–35.7)
MCV RBC AUTO: 92.4 FL (ref 79–97)
MONOCYTES # BLD AUTO: 0.7 10*3/MM3 (ref 0.1–0.9)
MONOCYTES NFR BLD AUTO: 6.4 % (ref 5–12)
NEUTROPHILS # BLD AUTO: 8.8 10*3/MM3 (ref 1.7–7)
NEUTROPHILS NFR BLD AUTO: 83.8 % (ref 42.7–76)
NRBC BLD AUTO-RTO: 0 /100 WBC (ref 0–0.2)
PLATELET # BLD AUTO: 371 10*3/MM3 (ref 140–450)
PMV BLD AUTO: 7.7 FL (ref 6–12)
POTASSIUM BLD-SCNC: 4.5 MMOL/L (ref 3.5–5.2)
POTASSIUM BLD-SCNC: 5.2 MMOL/L (ref 3.5–5.2)
PROT SERPL-MCNC: 5.7 G/DL (ref 6–8.5)
PROT SERPL-MCNC: 7.2 G/DL (ref 6–8.5)
PROTHROMBIN TIME: 11.2 SECONDS (ref 9.6–11.7)
RBC # BLD AUTO: 5.09 10*6/MM3 (ref 4.14–5.8)
RH BLD: POSITIVE
SODIUM BLD-SCNC: 135 MMOL/L (ref 136–145)
SODIUM BLD-SCNC: 139 MMOL/L (ref 136–145)
T&S EXPIRATION DATE: NORMAL
WBC NRBC COR # BLD: 10.5 10*3/MM3 (ref 3.4–10.8)

## 2020-03-15 PROCEDURE — 86901 BLOOD TYPING SEROLOGIC RH(D): CPT

## 2020-03-15 PROCEDURE — 25010000003 AMPICILLIN-SULBACTAM PER 1.5 G: Performed by: INTERNAL MEDICINE

## 2020-03-15 PROCEDURE — 86901 BLOOD TYPING SEROLOGIC RH(D): CPT | Performed by: EMERGENCY MEDICINE

## 2020-03-15 PROCEDURE — 85730 THROMBOPLASTIN TIME PARTIAL: CPT | Performed by: EMERGENCY MEDICINE

## 2020-03-15 PROCEDURE — 99222 1ST HOSP IP/OBS MODERATE 55: CPT | Performed by: SURGERY

## 2020-03-15 PROCEDURE — 25010000002 LORAZEPAM PER 2 MG

## 2020-03-15 PROCEDURE — 25010000002 HYDROMORPHONE PER 4 MG: Performed by: EMERGENCY MEDICINE

## 2020-03-15 PROCEDURE — 85025 COMPLETE CBC W/AUTO DIFF WBC: CPT | Performed by: EMERGENCY MEDICINE

## 2020-03-15 PROCEDURE — 74177 CT ABD & PELVIS W/CONTRAST: CPT

## 2020-03-15 PROCEDURE — 86850 RBC ANTIBODY SCREEN: CPT | Performed by: EMERGENCY MEDICINE

## 2020-03-15 PROCEDURE — 80053 COMPREHEN METABOLIC PANEL: CPT | Performed by: EMERGENCY MEDICINE

## 2020-03-15 PROCEDURE — 0 IOPAMIDOL PER 1 ML: Performed by: EMERGENCY MEDICINE

## 2020-03-15 PROCEDURE — 25010000002 ONDANSETRON PER 1 MG

## 2020-03-15 PROCEDURE — 85610 PROTHROMBIN TIME: CPT | Performed by: EMERGENCY MEDICINE

## 2020-03-15 PROCEDURE — 25010000002 VANCOMYCIN 10 G RECONSTITUTED SOLUTION: Performed by: EMERGENCY MEDICINE

## 2020-03-15 PROCEDURE — 25010000002 HYDROMORPHONE PER 4 MG

## 2020-03-15 PROCEDURE — 83690 ASSAY OF LIPASE: CPT | Performed by: EMERGENCY MEDICINE

## 2020-03-15 PROCEDURE — 86900 BLOOD TYPING SEROLOGIC ABO: CPT | Performed by: EMERGENCY MEDICINE

## 2020-03-15 PROCEDURE — 80053 COMPREHEN METABOLIC PANEL: CPT | Performed by: INTERNAL MEDICINE

## 2020-03-15 PROCEDURE — 86900 BLOOD TYPING SEROLOGIC ABO: CPT

## 2020-03-15 PROCEDURE — 99284 EMERGENCY DEPT VISIT MOD MDM: CPT

## 2020-03-15 PROCEDURE — 25010000003 AMPICILLIN-SULBACTAM PER 1.5 G: Performed by: EMERGENCY MEDICINE

## 2020-03-15 PROCEDURE — 25010000002 HYDROMORPHONE PER 4 MG: Performed by: INTERNAL MEDICINE

## 2020-03-15 RX ORDER — SODIUM CHLORIDE 0.9 % (FLUSH) 0.9 %
10 SYRINGE (ML) INJECTION EVERY 12 HOURS SCHEDULED
Status: DISCONTINUED | OUTPATIENT
Start: 2020-03-15 | End: 2020-03-19 | Stop reason: HOSPADM

## 2020-03-15 RX ORDER — HYDROMORPHONE HCL 110MG/55ML
0.5 PATIENT CONTROLLED ANALGESIA SYRINGE INTRAVENOUS ONCE
Status: COMPLETED | OUTPATIENT
Start: 2020-03-15 | End: 2020-03-15

## 2020-03-15 RX ORDER — LORAZEPAM 2 MG/ML
INJECTION INTRAMUSCULAR
Status: COMPLETED
Start: 2020-03-15 | End: 2020-03-15

## 2020-03-15 RX ORDER — ONDANSETRON 2 MG/ML
8 INJECTION INTRAMUSCULAR; INTRAVENOUS ONCE
Status: COMPLETED | OUTPATIENT
Start: 2020-03-15 | End: 2020-03-15

## 2020-03-15 RX ORDER — SODIUM CHLORIDE 0.9 % (FLUSH) 0.9 %
10 SYRINGE (ML) INJECTION AS NEEDED
Status: DISCONTINUED | OUTPATIENT
Start: 2020-03-15 | End: 2020-03-19 | Stop reason: HOSPADM

## 2020-03-15 RX ORDER — HYDROMORPHONE HCL 110MG/55ML
PATIENT CONTROLLED ANALGESIA SYRINGE INTRAVENOUS
Status: COMPLETED
Start: 2020-03-15 | End: 2020-03-15

## 2020-03-15 RX ORDER — DIGOXIN 250 MCG
250 TABLET ORAL
COMMUNITY
End: 2020-12-21

## 2020-03-15 RX ORDER — LORAZEPAM 2 MG/ML
1 INJECTION INTRAMUSCULAR ONCE
Status: COMPLETED | OUTPATIENT
Start: 2020-03-15 | End: 2020-03-15

## 2020-03-15 RX ORDER — ONDANSETRON 2 MG/ML
INJECTION INTRAMUSCULAR; INTRAVENOUS
Status: COMPLETED
Start: 2020-03-15 | End: 2020-03-15

## 2020-03-15 RX ORDER — ONDANSETRON 2 MG/ML
4 INJECTION INTRAMUSCULAR; INTRAVENOUS EVERY 4 HOURS PRN
Status: DISCONTINUED | OUTPATIENT
Start: 2020-03-15 | End: 2020-03-19 | Stop reason: HOSPADM

## 2020-03-15 RX ORDER — IPRATROPIUM BROMIDE AND ALBUTEROL SULFATE 2.5; .5 MG/3ML; MG/3ML
3 SOLUTION RESPIRATORY (INHALATION) EVERY 4 HOURS PRN
Status: DISCONTINUED | OUTPATIENT
Start: 2020-03-15 | End: 2020-03-19 | Stop reason: HOSPADM

## 2020-03-15 RX ORDER — VANCOMYCIN HYDROCHLORIDE
15 EVERY 24 HOURS
Status: DISCONTINUED | OUTPATIENT
Start: 2020-03-16 | End: 2020-03-18

## 2020-03-15 RX ORDER — HYDROMORPHONE HCL 110MG/55ML
1 PATIENT CONTROLLED ANALGESIA SYRINGE INTRAVENOUS ONCE
Status: COMPLETED | OUTPATIENT
Start: 2020-03-15 | End: 2020-03-15

## 2020-03-15 RX ORDER — HYDRALAZINE HYDROCHLORIDE 20 MG/ML
10 INJECTION INTRAMUSCULAR; INTRAVENOUS EVERY 6 HOURS PRN
Status: DISCONTINUED | OUTPATIENT
Start: 2020-03-15 | End: 2020-03-19 | Stop reason: HOSPADM

## 2020-03-15 RX ORDER — FAMOTIDINE 10 MG/ML
20 INJECTION, SOLUTION INTRAVENOUS EVERY 12 HOURS SCHEDULED
Status: DISCONTINUED | OUTPATIENT
Start: 2020-03-15 | End: 2020-03-19 | Stop reason: HOSPADM

## 2020-03-15 RX ORDER — POTASSIUM CHLORIDE 750 MG/1
10 TABLET, FILM COATED, EXTENDED RELEASE ORAL
COMMUNITY
End: 2021-10-21 | Stop reason: HOSPADM

## 2020-03-15 RX ORDER — SODIUM CHLORIDE, SODIUM LACTATE, POTASSIUM CHLORIDE, CALCIUM CHLORIDE 600; 310; 30; 20 MG/100ML; MG/100ML; MG/100ML; MG/100ML
100 INJECTION, SOLUTION INTRAVENOUS CONTINUOUS
Status: DISCONTINUED | OUTPATIENT
Start: 2020-03-15 | End: 2020-03-19 | Stop reason: HOSPADM

## 2020-03-15 RX ORDER — HYDROMORPHONE HCL 110MG/55ML
0.5 PATIENT CONTROLLED ANALGESIA SYRINGE INTRAVENOUS
Status: DISCONTINUED | OUTPATIENT
Start: 2020-03-15 | End: 2020-03-19 | Stop reason: HOSPADM

## 2020-03-15 RX ORDER — METOPROLOL TARTRATE 5 MG/5ML
5 INJECTION INTRAVENOUS EVERY 6 HOURS PRN
Status: DISCONTINUED | OUTPATIENT
Start: 2020-03-15 | End: 2020-03-19 | Stop reason: HOSPADM

## 2020-03-15 RX ADMIN — AMPICILLIN AND SULBACTAM 3 G: 1; 2 INJECTION, POWDER, FOR SOLUTION INTRAMUSCULAR; INTRAVENOUS at 22:24

## 2020-03-15 RX ADMIN — HYDROMORPHONE HYDROCHLORIDE 0.5 MG: 2 INJECTION, SOLUTION INTRAMUSCULAR; INTRAVENOUS; SUBCUTANEOUS at 20:14

## 2020-03-15 RX ADMIN — HYDROMORPHONE HYDROCHLORIDE 1 MG: 2 INJECTION, SOLUTION INTRAMUSCULAR; INTRAVENOUS; SUBCUTANEOUS at 14:02

## 2020-03-15 RX ADMIN — Medication 10 ML: at 20:14

## 2020-03-15 RX ADMIN — ONDANSETRON 8 MG: 2 INJECTION INTRAMUSCULAR; INTRAVENOUS at 14:02

## 2020-03-15 RX ADMIN — SODIUM CHLORIDE, SODIUM LACTATE, POTASSIUM CHLORIDE, AND CALCIUM CHLORIDE 100 ML/HR: 600; 310; 30; 20 INJECTION, SOLUTION INTRAVENOUS at 20:14

## 2020-03-15 RX ADMIN — FAMOTIDINE 20 MG: 10 INJECTION INTRAVENOUS at 13:42

## 2020-03-15 RX ADMIN — IOPAMIDOL 55 ML: 755 INJECTION, SOLUTION INTRAVENOUS at 15:30

## 2020-03-15 RX ADMIN — SODIUM CHLORIDE, SODIUM LACTATE, POTASSIUM CHLORIDE, AND CALCIUM CHLORIDE 2000 ML: 600; 310; 30; 20 INJECTION, SOLUTION INTRAVENOUS at 13:41

## 2020-03-15 RX ADMIN — HYDROMORPHONE HYDROCHLORIDE 0.5 MG: 2 INJECTION, SOLUTION INTRAMUSCULAR; INTRAVENOUS; SUBCUTANEOUS at 16:17

## 2020-03-15 RX ADMIN — VANCOMYCIN HYDROCHLORIDE 2000 MG: 10 INJECTION, POWDER, LYOPHILIZED, FOR SOLUTION INTRAVENOUS at 16:51

## 2020-03-15 RX ADMIN — Medication 1 MG: at 14:02

## 2020-03-15 RX ADMIN — LORAZEPAM 1 MG: 2 INJECTION INTRAMUSCULAR; INTRAVENOUS at 14:02

## 2020-03-15 RX ADMIN — FAMOTIDINE 20 MG: 10 INJECTION INTRAVENOUS at 20:14

## 2020-03-15 RX ADMIN — HYDROMORPHONE HYDROCHLORIDE 0.5 MG: 2 INJECTION, SOLUTION INTRAMUSCULAR; INTRAVENOUS; SUBCUTANEOUS at 22:29

## 2020-03-15 RX ADMIN — LORAZEPAM 1 MG: 2 INJECTION INTRAMUSCULAR at 14:02

## 2020-03-15 RX ADMIN — AMPICILLIN AND SULBACTAM 3 G: 1; 2 INJECTION, POWDER, FOR SOLUTION INTRAMUSCULAR; INTRAVENOUS at 16:21

## 2020-03-15 NOTE — ED PROVIDER NOTES
Subjective   74-year-old male with the onset of vomiting since midnight he states he is vomited at least 6 times.  He states last episode of vomiting had some dark coffee-ground quality to it and smelled like stool.  He states he has had bowel obstruction several times in the past.  He reports that he has had subjective fever and chills this afternoon.  He states he has had increasing abdominal discomfort since about 9 this morning.  He states that he has been distended more less since midnight.  Reports no recent melena or hematochezia          Review of Systems    Past Medical History:   Diagnosis Date   • Bruises easily    • CHF (congestive heart failure) (CMS/[facility])    • Congenital heart defect    • Difficulty attaining erection    • Hypertension    • Low back pain    • Poor circulation    • Prostate cancer (CMS/[facility])    • Shortness of breath    • Stented coronary artery 12/05/2019    MICHAEL to LAD       Allergies   Allergen Reactions   • Haloperidol Hives   • Morphine Itching   • Pantoprazole Other (See Comments)     Feels sick after receiving       Past Surgical History:   Procedure Laterality Date   • APPENDECTOMY     • CARDIAC CATHETERIZATION N/A 12/5/2019    Procedure: Left Heart Cath;  Surgeon: Mirza Munson MD;  Location: [facility] CATH INVASIVE LOCATION;  Service: Cardiology   • CARDIAC CATHETERIZATION N/A 12/5/2019    Procedure: Stent MICHAEL coronary;  Surgeon: Mirza Munson MD;  Location: [facility] CATH INVASIVE LOCATION;  Service: Cardiology   • CHOLECYSTECTOMY     • ELBOW PROCEDURE      left   • HERNIA REPAIR     • INTERVENTIONAL RADIOLOGY PROCEDURE N/A 12/5/2019    Procedure: Intravascular Ultrasound;  Surgeon: Mirza Munson MD;  Location: [facility] CATH INVASIVE LOCATION;  Service: Cardiology   • SD RT/LT HEART CATHETERS N/A 12/5/2019    Procedure: Percutaneous Coronary Intervention;  Surgeon: Mirza Munson MD;  Location: [facility] CATH INVASIVE LOCATION;  Service:  Cardiology       Family History   Problem Relation Age of Onset   • Alzheimer's disease Mother    • GI problems Father    • Heart disease Father        Social History     Socioeconomic History   • Marital status:      Spouse name: Not on file   • Number of children: Not on file   • Years of education: Not on file   • Highest education level: Not on file   Tobacco Use   • Smoking status: Former Smoker   • Smokeless tobacco: Never Used   Substance and Sexual Activity   • Alcohol use: Yes     Frequency: Never     Comment: socially    • Drug use: No   • Sexual activity: Defer       Social history no recent unusual food water travel or activity  No known coronavirus exposure  Objective   Physical Exam  Alert Fletcher Coma Scale 15   HEENT: Pupils equal and reactive to light. Conjunctivae are not injected. normal tympanic membranes. Oropharynx and nares are normal.   Neck: Supple. Midline trachea. No JVD. No goiter.   Chest: Clear and equal breath sounds bilaterally regular rate and rhythm without murmur or rub.   Abdomen: The patient has a markedly distended abdomen with evidence of multiple previous surgical procedures.  The patient has hypoactive bowel sounds.  Patient has diffuse periumbilical tenderness with extensive voluntary guarding  Extremities no clubbing cyanosis or edema motor sensory exam is normal the full range of motion is intact   skin: Warm and dry, no rashes or petechia.   Lymphatic: No regional lymphadenopathy. No calf pain, swelling or Jamar's sign    Procedures           ED Course                                  Labs Reviewed   COMPREHENSIVE METABOLIC PANEL - Abnormal; Notable for the following components:       Result Value    Glucose 152 (*)     BUN 29 (*)     Creatinine 1.60 (*)     Chloride 97 (*)     CO2 30.0 (*)     Alkaline Phosphatase 141 (*)     Total Bilirubin 1.3 (*)     eGFR Non  Amer 43 (*)     All other components within normal limits    Narrative:     GFR Normal  >60  Chronic Kidney Disease <60  Kidney Failure <15     LIPASE - Abnormal; Notable for the following components:    Lipase 10 (*)     All other components within normal limits   CBC WITH AUTO DIFFERENTIAL - Abnormal; Notable for the following components:    RDW 15.7 (*)     Neutrophil % 83.8 (*)     Lymphocyte % 7.4 (*)     Neutrophils, Absolute 8.80 (*)     All other components within normal limits   PROTIME-INR - Normal   APTT - Normal   TYPE AND SCREEN   BB ARMBAND CHECK   CBC AND DIFFERENTIAL    Narrative:     The following orders were created for panel order CBC & Differential.  Procedure                               Abnormality         Status                     ---------                               -----------         ------                     CBC Auto Differential[[id number]]        Abnormal            Final result                 Please view results for these tests on the individual orders.     Medications   ampicillin-sulbactam (UNASYN) 3 g in sodium chloride 0.9 % 100 mL IVPB-MBP (has no administration in time range)   Pharmacy to dose vancomycin (has no administration in time range)   HYDROmorphone (DILAUDID) injection 0.5 mg (has no administration in time range)   lactated ringers bolus 2,000 mL (0 mL Intravenous Stopped 3/15/20 1500)   famotidine (PEPCID) injection 20 mg (20 mg Intravenous Given 3/15/20 1342)   HYDROmorphone (DILAUDID) injection 1 mg (1 mg Intravenous Given 3/15/20 1402)   LORazepam (ATIVAN) injection 1 mg (1 mg Intravenous Given 3/15/20 1402)   ondansetron (ZOFRAN) injection 8 mg (8 mg Intravenous Given 3/15/20 1402)   iopamidol (ISOVUE-370) 76 % injection 100 mL (55 mL Intravenous Given 3/15/20 1530)     Ct Abdomen Pelvis With Contrast    Result Date: 3/15/2020   1. There are findings consistent with a high-grade small bowel obstruction. The patient also has pneumatosis within the dilated loops of small bowel as well as free air. The findings were discussed with Dr. Malachi Du,  M.D. by telephone. 2. Marked diastases of the rectus abdominous muscle similar to before. 3. Right renal stones. There is a 13 mm staghorn calculus in the interpolar region of the right kidney. 4. The nasogastric tube tip terminates within the stomach which is decompressed. 5. Additional findings as noted above.  Electronically Signed By-Yusuf Gray On:3/15/2020 4:03 PM This report was finalized on [id number] by  Yusuf Gray, .                MDM  Number of Diagnoses or Management Options     Amount and/or Complexity of Data Reviewed  Clinical lab tests: reviewed  Tests in the radiology section of CPT®: reviewed  Obtain history from someone other than the patient: yes  Review and summarize past medical records: yes  Discuss the patient with other providers: yes  Independent visualization of images, tracings, or specimens: yes    Risk of Complications, Morbidity, and/or Mortality  Presenting problems: high  Diagnostic procedures: high  Management options: high  General comments: The case was discussed with the on-call physician as well as for surgery.  The patient was given 2 L of isotonic fluids also received Protonix and IV Unasyn and vancomycin.  The patient had an NG tube inserted and over a liter of gastric contents were removed.  The patient will be admitted and will need additional surgical evaluation and treatment.  He is agreeable this plan of treatment.  The case was also discussed with the radiologist        Final diagnoses:   Small bowel obstruction due to adhesions (CMS/HCC)   Acute generalized abdominal pain   Perforated abdominal viscus            oRdolfo Du MD  03/15/20 4813

## 2020-03-15 NOTE — CONSULTS
GENERAL SURGERY CONSULT      Patient Care Team:  Yusuf Jones MD as PCP - General  PhiJoshua MD as Consulting Physician (Cardiology)    Chief complaint    small bowel obstruction  Subjective     There is a 74-year-old gentleman that presented to the emergency room on March 15, 2020 with 3 days of vomiting and abdominal pain.  He has a complex past surgical history including multiple ventral hernia repairs and in 2013 I operated on him by performing extensive lysis of adhesions and I was not able to close his abdomen primarily.  I closed with Vicryl mesh and closed the skin over the Vicryl mesh.  He has had episodes of having open wound of the abdominal wall that is blood in the plastic but over the last several months his wound has been healed.  A few weeks ago he was having some abdominal pain and an outpatient CT scan showed an evidence of a transition point in the pelvis.  He has been seeing Dr. Covarrubias for this.  He been to the emergency room and another CT was performed that demonstrated this transition point to the right of midline in the pelvis but also a very small amount of free air and possible pneumatosis.    Review of Systems   All systems were reviewed and negative except for:  Gastrointestinal: positive for  nausea, pain and See HPI    History  Past Medical History:   Diagnosis Date   • Bruises easily    • CHF (congestive heart failure) (CMS/HCC)    • Congenital heart defect    • Difficulty attaining erection    • Hypertension    • Low back pain    • Poor circulation    • Prostate cancer (CMS/HCC)    • Shortness of breath    • Stented coronary artery 12/05/2019    MICHAEL to LAD     Past Surgical History:   Procedure Laterality Date   • APPENDECTOMY     • CARDIAC CATHETERIZATION N/A 12/5/2019    Procedure: Left Heart Cath;  Surgeon: Mirza Munson MD;  Location: [facility] CATH INVASIVE LOCATION;  Service: Cardiology   • CARDIAC CATHETERIZATION N/A 12/5/2019    Procedure: Stent MICHAEL  coronary;  Surgeon: Mirza Munson MD;  Location: [facility] CATH INVASIVE LOCATION;  Service: Cardiology   • CHOLECYSTECTOMY     • ELBOW PROCEDURE      left   • HERNIA REPAIR     • INTERVENTIONAL RADIOLOGY PROCEDURE N/A 12/5/2019    Procedure: Intravascular Ultrasound;  Surgeon: Mirza Munson MD;  Location: [facility] CATH INVASIVE LOCATION;  Service: Cardiology   • MS RT/LT HEART CATHETERS N/A 12/5/2019    Procedure: Percutaneous Coronary Intervention;  Surgeon: Mirza Munson MD;  Location: [facility] CATH INVASIVE LOCATION;  Service: Cardiology     Family History   Problem Relation Age of Onset   • Alzheimer's disease Mother    • GI problems Father    • Heart disease Father      Social History     Tobacco Use   • Smoking status: Former Smoker   • Smokeless tobacco: Never Used   Substance Use Topics   • Alcohol use: Yes     Frequency: Never     Comment: socially    • Drug use: No       (Not in a hospital admission)  Allergies:  Haloperidol; Morphine; and Pantoprazole    Objective     Vital Signs  Temp:  [97 °F (36.1 °C)] 97 °F (36.1 °C)  Heart Rate:  [70-78] 70  Resp:  [18-20] 19  BP: ()/(50-81) 153/79    Physical Exam:      General Appearance:    Alert, cooperative, in no acute distress   Head:    Normocephalic, without obvious abnormality, atraumatic,    Eyes:            Lids and lashes normal, conjunctivae and sclerae normal, no   icterus, no pallor, corneas clear, PERRLA   Ears:    Ears appear intact with no abnormalities noted   Throat:   No oral lesions, no thrush, oral mucosa moist   Neck:   No adenopathy, supple, trachea midline, no thyromegaly, no   carotid bruit, no JVD   Back:     No kyphosis present, no scoliosis present, no skin lesions,      erythema or scars, no tenderness to percussion or                   palpation,   range of motion normal   Lungs:     Clear to auscultation,respirations regular, even and                  unlabored    Heart:    Regular rhythm and  normal rate, normal S1 and S2, no            murmur, no gallop, no rub, no click   Chest Wall:    No abnormalities observed   Abdomen:    Extremely large ventral hernia with loss of domain.  No skin openings.  No abdominal tenderness no peritoneal signs.   Rectal:     Deferred   Extremities: No edema, good ROM   Pulses:   Pulses palpable and equal bilaterally   Skin:   No bleeding, bruising or rash   Lymph nodes:   No palpable adenopathy   Neurologic:   Cranial nerves 2 - 12 grossly intact, sensation intact, DTR       present and equal bilaterally     Lab Results (last 24 hours)     Procedure Component Value Units Date/Time    Comprehensive Metabolic Panel [[id number]]  (Abnormal) Collected:  03/15/20 1336    Specimen:  Blood Updated:  03/15/20 1404     Glucose 152 mg/dL      BUN 29 mg/dL      Creatinine 1.60 mg/dL      Sodium 139 mmol/L      Potassium 5.2 mmol/L      Chloride 97 mmol/L      CO2 30.0 mmol/L      Calcium 9.4 mg/dL      Total Protein 7.2 g/dL      Albumin 4.10 g/dL      ALT (SGPT) 8 U/L      AST (SGOT) 13 U/L      Alkaline Phosphatase 141 U/L      Total Bilirubin 1.3 mg/dL      eGFR Non African Amer 43 mL/min/1.73      Globulin 3.1 gm/dL      A/G Ratio 1.3 g/dL      BUN/Creatinine Ratio 18.1     Anion Gap 12.0 mmol/L     Narrative:       GFR Normal >60  Chronic Kidney Disease <60  Kidney Failure <15      Lipase [[id number]]  (Abnormal) Collected:  03/15/20 1336    Specimen:  Blood Updated:  03/15/20 1404     Lipase 10 U/L     Protime-INR [[id number]]  (Normal) Collected:  03/15/20 1336    Specimen:  Blood Updated:  03/15/20 1355     Protime 11.2 Seconds      INR 1.09    aPTT [[id number]]  (Normal) Collected:  03/15/20 1336    Specimen:  Blood Updated:  03/15/20 1355     PTT 24.5 seconds     CBC & Differential [[id number]] Collected:  03/15/20 1336    Specimen:  Blood Updated:  03/15/20 1350    Narrative:       The following orders were created for panel order CBC & Differential.  Procedure                                Abnormality         Status                     ---------                               -----------         ------                     CBC Auto Differential[[id number]]        Abnormal            Final result                 Please view results for these tests on the individual orders.    CBC Auto Differential [[id number]]  (Abnormal) Collected:  03/15/20 1336    Specimen:  Blood Updated:  03/15/20 1350     WBC 10.50 10*3/mm3      RBC 5.09 10*6/mm3      Hemoglobin 16.6 g/dL      Hematocrit 47.0 %      MCV 92.4 fL      MCH 32.6 pg      MCHC 35.3 g/dL      RDW 15.7 %      RDW-SD 51.2 fl      MPV 7.7 fL      Platelets 371 10*3/mm3      Neutrophil % 83.8 %      Lymphocyte % 7.4 %      Monocyte % 6.4 %      Eosinophil % 1.7 %      Basophil % 0.7 %      Neutrophils, Absolute 8.80 10*3/mm3      Lymphocytes, Absolute 0.80 10*3/mm3      Monocytes, Absolute 0.70 10*3/mm3      Eosinophils, Absolute 0.20 10*3/mm3      Basophils, Absolute 0.10 10*3/mm3      nRBC 0.0 /100 WBC           Imaging Results (Last 24 Hours)     Procedure Component Value Units Date/Time    CT Abdomen Pelvis With Contrast [[id number]] Collected:  03/15/20 1553     Updated:  03/15/20 1605    Narrative:       DATE OF EXAM:  3/15/2020 3:18 PM     PROCEDURE:  CT ABDOMEN PELVIS W CONTRAST-     INDICATIONS:   Lower abdominal pain, vomiting bile and feces, suspected bowel  obstruction.     COMPARISON:   06/11/2015.     TECHNIQUE:  Routine transaxial slices were obtained through the abdomen and pelvis  after the intravenous administration of 50 mL of Isovue 370.  Reconstructed coronal and sagittal images were also obtained. Automated  exposure control and iterative construction methods were used.     FINDINGS:  There is a nasogastric tube within the stomach which is decompressed.  The duodenum and proximal small bowel are markedly fluid-filled and  dilated. This is consistent with a high-grade bowel obstruction. The  transition point is located within  the mid pelvis and best demonstrated  on image 113. There is pneumatosis within the dilated loops of small  bowel in the anterior aspect of the abdomen and there is a tiny amount  of free air in the anterior abdomen best demonstrated on images 70-75  consistent with a perforated viscus. There is marked diastases of the  rectus abdominis muscle similar to before. The appendix is not  visualized and is probably surgically absent. The gallbladder is  surgically absent. The liver, adrenal glands, pancreas, and spleen are  normal. There is a small staghorn calculus in the interpolar region of  the right kidney measuring 13 mm. There are also additional calculi seen  within the inferior pole. The right kidney is unremarkable. There are  atherosclerotic vascular calcifications with within the abdomen and  pelvis. The abdominal aorta is ectatic. There are radioactive seed  implants in the prostate gland. The seminal vesicles and urinary bladder  are normal. There is a mild right basilar subsegmental atelectasis. The  left lung base is clear. There are no suspicious osteolytic or sclerotic  lesions within the bony structures.        Impression:          1. There are findings consistent with a high-grade small bowel  obstruction. The patient also has pneumatosis within the dilated loops  of small bowel as well as free air. The findings were discussed with Dr. Malachi Du M.D. by telephone.  2. Marked diastases of the rectus abdominous muscle similar to before.  3. Right renal stones. There is a 13 mm staghorn calculus in the  interpolar region of the right kidney.  4. The nasogastric tube tip terminates within the stomach which is  decompressed.  5. Additional findings as noted above.     Electronically Signed By-Yusuf Gray On:3/15/2020 4:03 PM  This report was finalized on [id number] by  Yusuf Gray, .          Results Review:    I reviewed the patient's new clinical results.  I reviewed the patient's new imaging results  and agree with the interpretation.    Assessment/Plan       Small bowel obstruction due to adhesions (CMS/HCC)      This 74-year-old gentleman who presents with evidence of small bowel obstruction on CT scan.  He has a very large ventral hernia with loss of domain.  He has a extensive history of abdominal surgery and adhesions.  Despite his CT scan showing evidence of a small amount of free air and pneumatosis I do not believe proceeding the operating room for exploratory laparotomy would be wise.  An exploration is likely to cause multiple enterotomies and risks having to have an open abdominal wound.    Continue nasogastric tube decompression and on Tuesday we will plan on a repeat CT scan with oral and IV contrast to further assess.    Cleopatra Wang MD  03/15/20  17:14

## 2020-03-15 NOTE — ED NOTES
Pt reports abdominal pain, swelling and vomiting up stool for the past two days. Pt reports constipation.   Pt does report hx of bowel obstruction. Pt reports vomiting x2 since last night and coffee ground emesis. Pt reports last bowel surgery aprox 5yr ago.        Gypsy Moran, RN  03/15/20 6410       Gypsy Moran, RN  03/15/20 6250

## 2020-03-16 LAB
ALBUMIN SERPL-MCNC: 3.2 G/DL (ref 3.5–5.2)
ALBUMIN/GLOB SERPL: 1.3 G/DL
ALP SERPL-CCNC: 216 U/L (ref 39–117)
ALT SERPL W P-5'-P-CCNC: 57 U/L (ref 1–41)
ANION GAP SERPL CALCULATED.3IONS-SCNC: 9 MMOL/L (ref 5–15)
AST SERPL-CCNC: 195 U/L (ref 1–40)
BASOPHILS # BLD AUTO: 0.1 10*3/MM3 (ref 0–0.2)
BASOPHILS NFR BLD AUTO: 1.7 % (ref 0–1.5)
BILIRUB SERPL-MCNC: 2.6 MG/DL (ref 0.2–1.2)
BUN BLD-MCNC: 22 MG/DL (ref 8–23)
BUN/CREAT SERPL: 16.8 (ref 7–25)
CALCIUM SPEC-SCNC: 8.1 MG/DL (ref 8.6–10.5)
CHLORIDE SERPL-SCNC: 101 MMOL/L (ref 98–107)
CO2 SERPL-SCNC: 28 MMOL/L (ref 22–29)
CREAT BLD-MCNC: 1.31 MG/DL (ref 0.76–1.27)
DEPRECATED RDW RBC AUTO: 50.3 FL (ref 37–54)
EOSINOPHIL # BLD AUTO: 0.2 10*3/MM3 (ref 0–0.4)
EOSINOPHIL NFR BLD AUTO: 3.5 % (ref 0.3–6.2)
ERYTHROCYTE [DISTWIDTH] IN BLOOD BY AUTOMATED COUNT: 15.5 % (ref 12.3–15.4)
GFR SERPL CREATININE-BSD FRML MDRD: 54 ML/MIN/1.73
GLOBULIN UR ELPH-MCNC: 2.4 GM/DL
GLUCOSE BLD-MCNC: 131 MG/DL (ref 65–99)
HCT VFR BLD AUTO: 40.8 % (ref 37.5–51)
HGB BLD-MCNC: 14 G/DL (ref 13–17.7)
LYMPHOCYTES # BLD AUTO: 0.9 10*3/MM3 (ref 0.7–3.1)
LYMPHOCYTES NFR BLD AUTO: 14.9 % (ref 19.6–45.3)
MCH RBC QN AUTO: 31.6 PG (ref 26.6–33)
MCHC RBC AUTO-ENTMCNC: 34.4 G/DL (ref 31.5–35.7)
MCV RBC AUTO: 91.9 FL (ref 79–97)
MONOCYTES # BLD AUTO: 0.5 10*3/MM3 (ref 0.1–0.9)
MONOCYTES NFR BLD AUTO: 8.4 % (ref 5–12)
NEUTROPHILS # BLD AUTO: 4.1 10*3/MM3 (ref 1.7–7)
NEUTROPHILS NFR BLD AUTO: 71.5 % (ref 42.7–76)
NRBC BLD AUTO-RTO: 0 /100 WBC (ref 0–0.2)
NT-PROBNP SERPL-MCNC: 490.5 PG/ML (ref 5–900)
PLATELET # BLD AUTO: 260 10*3/MM3 (ref 140–450)
PMV BLD AUTO: 7.8 FL (ref 6–12)
POTASSIUM BLD-SCNC: 4.7 MMOL/L (ref 3.5–5.2)
PROT SERPL-MCNC: 5.6 G/DL (ref 6–8.5)
RBC # BLD AUTO: 4.44 10*6/MM3 (ref 4.14–5.8)
SODIUM BLD-SCNC: 138 MMOL/L (ref 136–145)
WBC NRBC COR # BLD: 5.8 10*3/MM3 (ref 3.4–10.8)

## 2020-03-16 PROCEDURE — 94799 UNLISTED PULMONARY SVC/PX: CPT

## 2020-03-16 PROCEDURE — 83880 ASSAY OF NATRIURETIC PEPTIDE: CPT | Performed by: NURSE PRACTITIONER

## 2020-03-16 PROCEDURE — 25010000002 HYDROMORPHONE PER 4 MG: Performed by: INTERNAL MEDICINE

## 2020-03-16 PROCEDURE — 25010000003 AMPICILLIN-SULBACTAM PER 1.5 G: Performed by: INTERNAL MEDICINE

## 2020-03-16 PROCEDURE — 94640 AIRWAY INHALATION TREATMENT: CPT

## 2020-03-16 PROCEDURE — 85025 COMPLETE CBC W/AUTO DIFF WBC: CPT | Performed by: INTERNAL MEDICINE

## 2020-03-16 PROCEDURE — 25010000002 VANCOMYCIN 10 G RECONSTITUTED SOLUTION: Performed by: INTERNAL MEDICINE

## 2020-03-16 PROCEDURE — 25010000002 ONDANSETRON PER 1 MG: Performed by: INTERNAL MEDICINE

## 2020-03-16 PROCEDURE — 80053 COMPREHEN METABOLIC PANEL: CPT | Performed by: INTERNAL MEDICINE

## 2020-03-16 PROCEDURE — 99232 SBSQ HOSP IP/OBS MODERATE 35: CPT | Performed by: SURGERY

## 2020-03-16 PROCEDURE — 99222 1ST HOSP IP/OBS MODERATE 55: CPT | Performed by: INTERNAL MEDICINE

## 2020-03-16 RX ORDER — ASPIRIN 81 MG/1
81 TABLET, CHEWABLE ORAL DAILY
Status: DISCONTINUED | OUTPATIENT
Start: 2020-03-16 | End: 2020-03-19 | Stop reason: HOSPADM

## 2020-03-16 RX ORDER — PRASUGREL 10 MG/1
10 TABLET, FILM COATED ORAL DAILY
Status: DISCONTINUED | OUTPATIENT
Start: 2020-03-16 | End: 2020-03-19 | Stop reason: HOSPADM

## 2020-03-16 RX ADMIN — ONDANSETRON 4 MG: 2 INJECTION INTRAMUSCULAR; INTRAVENOUS at 08:32

## 2020-03-16 RX ADMIN — ONDANSETRON 4 MG: 2 INJECTION INTRAMUSCULAR; INTRAVENOUS at 21:03

## 2020-03-16 RX ADMIN — HYDROMORPHONE HYDROCHLORIDE 0.5 MG: 2 INJECTION, SOLUTION INTRAMUSCULAR; INTRAVENOUS; SUBCUTANEOUS at 12:07

## 2020-03-16 RX ADMIN — FAMOTIDINE 20 MG: 10 INJECTION INTRAVENOUS at 08:32

## 2020-03-16 RX ADMIN — AMPICILLIN AND SULBACTAM 3 G: 1; 2 INJECTION, POWDER, FOR SOLUTION INTRAMUSCULAR; INTRAVENOUS at 16:51

## 2020-03-16 RX ADMIN — FAMOTIDINE 20 MG: 10 INJECTION INTRAVENOUS at 21:03

## 2020-03-16 RX ADMIN — Medication 10 ML: at 08:32

## 2020-03-16 RX ADMIN — HYDROMORPHONE HYDROCHLORIDE 0.5 MG: 2 INJECTION, SOLUTION INTRAMUSCULAR; INTRAVENOUS; SUBCUTANEOUS at 21:03

## 2020-03-16 RX ADMIN — HYDROMORPHONE HYDROCHLORIDE 0.5 MG: 2 INJECTION, SOLUTION INTRAMUSCULAR; INTRAVENOUS; SUBCUTANEOUS at 04:21

## 2020-03-16 RX ADMIN — PRASUGREL 10 MG: 10 TABLET, FILM COATED ORAL at 16:49

## 2020-03-16 RX ADMIN — ONDANSETRON 4 MG: 2 INJECTION INTRAMUSCULAR; INTRAVENOUS at 16:49

## 2020-03-16 RX ADMIN — VANCOMYCIN HYDROCHLORIDE 1500 MG: 10 INJECTION, POWDER, LYOPHILIZED, FOR SOLUTION INTRAVENOUS at 16:52

## 2020-03-16 RX ADMIN — HYDROMORPHONE HYDROCHLORIDE 0.5 MG: 2 INJECTION, SOLUTION INTRAMUSCULAR; INTRAVENOUS; SUBCUTANEOUS at 16:50

## 2020-03-16 RX ADMIN — HYDROMORPHONE HYDROCHLORIDE 0.5 MG: 2 INJECTION, SOLUTION INTRAMUSCULAR; INTRAVENOUS; SUBCUTANEOUS at 23:44

## 2020-03-16 RX ADMIN — AMPICILLIN AND SULBACTAM 3 G: 1; 2 INJECTION, POWDER, FOR SOLUTION INTRAMUSCULAR; INTRAVENOUS at 23:42

## 2020-03-16 RX ADMIN — Medication 10 ML: at 21:04

## 2020-03-16 RX ADMIN — HYDROMORPHONE HYDROCHLORIDE 0.5 MG: 2 INJECTION, SOLUTION INTRAMUSCULAR; INTRAVENOUS; SUBCUTANEOUS at 01:24

## 2020-03-16 RX ADMIN — SODIUM CHLORIDE, SODIUM LACTATE, POTASSIUM CHLORIDE, AND CALCIUM CHLORIDE 100 ML/HR: 600; 310; 30; 20 INJECTION, SOLUTION INTRAVENOUS at 21:02

## 2020-03-16 RX ADMIN — AMPICILLIN AND SULBACTAM 3 G: 1; 2 INJECTION, POWDER, FOR SOLUTION INTRAMUSCULAR; INTRAVENOUS at 04:21

## 2020-03-16 RX ADMIN — IPRATROPIUM BROMIDE AND ALBUTEROL SULFATE 3 ML: .5; 3 SOLUTION RESPIRATORY (INHALATION) at 01:47

## 2020-03-16 RX ADMIN — AMPICILLIN AND SULBACTAM 3 G: 1; 2 INJECTION, POWDER, FOR SOLUTION INTRAMUSCULAR; INTRAVENOUS at 12:06

## 2020-03-16 RX ADMIN — ASPIRIN 81 MG 81 MG: 81 TABLET ORAL at 16:49

## 2020-03-16 RX ADMIN — SODIUM CHLORIDE, SODIUM LACTATE, POTASSIUM CHLORIDE, AND CALCIUM CHLORIDE 100 ML/HR: 600; 310; 30; 20 INJECTION, SOLUTION INTRAVENOUS at 16:49

## 2020-03-16 NOTE — H&P
Patient Care Team:  Yusuf Jones MD as PCP - General  [facility]ing [facility] (Cardiology)    Chief Complaint  Subjective     The patient is a 74 y.o. male presents with the onset of vomiting since midnight he states he is vomited at least 6 times.  He states last episode of vomiting had some dark coffee-ground quality to it and smelled like stool.  He states he has had bowel obstruction several times in the past.  He reports that he has had subjective fever and chills this afternoon.  He states he has had increasing abdominal discomfort since about 9 this morning.  He states that he has been distended more less since midnight.  Reports no recent melena or hematochezia       HPI    Review of Systems  HEENT: Pupils equal and reactive to light. Conjunctivae are not injected. normal tympanic membranes. Oropharynx and nares are normal.  Neck: Supple. Midline trachea. No JVD. No goiter.   Chest: Clear and equal breath sounds bilaterally regular rate and rhythm without murmur or rub.  Abdomen: The patient has a markedly distended abdomen with evidence of multiple previous surgical procedures.  The patient has hypoactive bowel sounds.  Patient has diffuse periumbilical tenderness with extensive voluntary guarding  Extremities no clubbing cyanosis or edema motor sensory exam is normal the full range of motion is intact  Skin: Warm and dry, no rashes or petechia.   Lymphatic: No regional lymphadenopathy. No calf pain, swelling or Jamar's sign     History  Past Medical History:   Diagnosis Date   • Bruises easily    • CHF (congestive heart failure) (CMS/HCC)    • Congenital heart defect    • Difficulty attaining erection    • Hypertension    • Low back pain    • Poor circulation    • Prostate cancer (CMS/HCC)    • Shortness of breath    • Stented coronary artery 12/05/2019    MICHAEL to LAD     Past Surgical History:   Procedure Laterality Date   • APPENDECTOMY     • CARDIAC CATHETERIZATION N/A  12/5/2019    Procedure: Left Heart Cath;  Surgeon: Mirza Munson MD;  Location: [facility] CATH INVASIVE LOCATION;  Service: Cardiology   • CARDIAC CATHETERIZATION N/A 12/5/2019    Procedure: Stent MICHAEL coronary;  Surgeon: Mirza Munson MD;  Location: [facility] CATH INVASIVE LOCATION;  Service: Cardiology   • CHOLECYSTECTOMY     • ELBOW PROCEDURE      left   • HERNIA REPAIR     • INTERVENTIONAL RADIOLOGY PROCEDURE N/A 12/5/2019    Procedure: Intravascular Ultrasound;  Surgeon: Mirza Munson MD;  Location: [facility] CATH INVASIVE LOCATION;  Service: Cardiology   • UT RT/LT HEART CATHETERS N/A 12/5/2019    Procedure: Percutaneous Coronary Intervention;  Surgeon: Mirza Munson MD;  Location: [facility] CATH INVASIVE LOCATION;  Service: Cardiology     Family History   Problem Relation Age of Onset   • Alzheimer's disease Mother    • GI problems Father    • Heart disease Father      Social History     Tobacco Use   • Smoking status: Former Smoker   • Smokeless tobacco: Never Used   Substance Use Topics   • Alcohol use: Not Currently     Frequency: Never   • Drug use: No     Allergies:  Haloperidol; Morphine; and Pantoprazole    Objective     Vital Signs  Temp:  [97 °F (36.1 °C)-98.6 °F (37 °C)] 98.6 °F (37 °C)  Heart Rate:  [70-78] 70  Resp:  [12-24] 14  BP: ()/(50-84) 130/63      Physical Exam:   Alert [place] Coma Scale 15  HEENT: Pupils equal and reactive to light. Conjunctivae are not injected. normal tympanic membranes. Oropharynx and nares are normal.   Neck: Supple. Midline trachea. No JVD. No goiter.   Chest: Clear and equal breath sounds bilaterally regular rate and rhythm without murmur or rub.  Abdomen: The patient has a markedly distended abdomen with evidence of multiple previous surgical procedures.  The patient has hypoactive bowel sounds.  Patient has diffuse periumbilical tenderness with extensive voluntary guarding  Extremities no clubbing cyanosis or edema  motor sensory exam is normal the full range of motion is intact  Skin: Warm and dry, no rashes or petechia.   Lymphatic: No regional lymphadenopathy. No calf pain, swelling or Jamar's sign     Results Review:   CBC    Results from last 7 days   Lab Units 03/16/20  0349 03/15/20  1336   WBC 10*3/mm3 5.80 10.50   HEMOGLOBIN g/dL 14.0 16.6   PLATELETS 10*3/mm3 260 371     BMP   Results from last 7 days   Lab Units 03/16/20  0349 03/15/20  1925 03/15/20  1336   SODIUM mmol/L 138 135* 139   POTASSIUM mmol/L 4.7 4.5 5.2   CHLORIDE mmol/L 101 102 97*   CO2 mmol/L 28.0 26.0 30.0*   BUN mg/dL 22 24* 29*   CREATININE mg/dL 1.31* 1.23 1.60*   GLUCOSE mg/dL 131* 114* 152*     Cr Clearance Estimated Creatinine Clearance: 70 mL/min (A) (by C-G formula based on SCr of 1.31 mg/dL (H)).  Coag   Results from last 7 days   Lab Units 03/15/20  1336   INR  1.09   APTT seconds 24.5     HbA1C No results found for: HGBA1C  Blood Glucose No results found for: POCGLU  Infection     CMP   Results from last 7 days   Lab Units 03/16/20  0349 03/15/20  1925 03/15/20  1336   SODIUM mmol/L 138 135* 139   POTASSIUM mmol/L 4.7 4.5 5.2   CHLORIDE mmol/L 101 102 97*   CO2 mmol/L 28.0 26.0 30.0*   BUN mg/dL 22 24* 29*   CREATININE mg/dL 1.31* 1.23 1.60*   GLUCOSE mg/dL 131* 114* 152*   ALBUMIN g/dL 3.20* 3.50 4.10   BILIRUBIN mg/dL 2.6* 1.0 1.3*   ALK PHOS U/L 216* 112 141*   AST (SGOT) U/L 195* 12 13   ALT (SGPT) U/L 57* 7 8   LIPASE U/L  --   --  10*     UA      Radiology(recent) Ct Abdomen Pelvis With Contrast    Result Date: 3/15/2020   1. There are findings consistent with a high-grade small bowel obstruction. The patient also has pneumatosis within the dilated loops of small bowel as well as free air. The findings were discussed with Dr. Malachi Du M.D. by telephone. 2. Marked diastases of the rectus abdominous muscle similar to before. 3. Right renal stones. There is a 13 mm staghorn calculus in the interpolar region of the right kidney. 4. The  nasogastric tube tip terminates within the stomach which is decompressed. 5. Additional findings as noted above.  Electronically Signed By-Yusuf Gray On:3/15/2020 4:03 PM This report was finalized on [id number] by  Yusuf Gray, .       Assessment:    Small bowel obstruction due to adhesions (CMS/HCC)  Kidney stones  Ascites  Pretension  COPD  Heart failure  Liver cirrhosis  AAA  CAD  CBD  Hyperlipidemia  Diabetes 2  Chronic respiratory  Chronic bronchitis  Prostate cancer  Polyneurpropathy    Plan:  General surgeon, cardiology consult submitted.  Assessment with care plan would be greatly appreciated; acute.  Expected Length of Stay 2-3 days.  However depending on care plan and patient progression will determine length of stay.  Patient was evaluated by general surgery ; at this time option for surgery has been omitted.    We will continue with comfort care with patient n.p.o. with ice chips.  Will continue with NG tube.    I discussed the patients findings and my recommendations with patient and nursing staff.     Yanelis Ramos, KOURTNEY  03/16/20  09:08

## 2020-03-16 NOTE — PROGRESS NOTES
Discharge Planning Assessment  [facility]     Patient Name: Ro Nathan  MRN: [id number]  Today's Date: 3/16/2020    Admit Date: 3/15/2020    Discharge Needs Assessment     Row Name 03/16/20 1416       Living Environment    Lives With  spouse    Name(s) of Who Lives With Patient  Meghan    Current Living Arrangements  home/apartment/condo    Primary Care Provided by  self;spouse/significant other    Provides Primary Care For  no one    Family Caregiver if Needed  spouse    Able to Return to Prior Arrangements  yes    Living Arrangement Comments  Independent - Lives in [place]       Resource/Environmental Concerns    Resource/Environmental Concerns  none       Transition Planning    Patient/Family Anticipates Transition to  home with family    Patient/Family Anticipated Services at Transition  none    Transportation Anticipated  family or friend will provide       Discharge Needs Assessment    Readmission Within the Last 30 Days  no previous admission in last 30 days    Concerns to be Addressed  no discharge needs identified    Equipment Currently Used at Home  none    Anticipated Changes Related to Illness  none    Equipment Needed After Discharge  none    Discharge Coordination/Progress  From home, expects to return home with spouse.        Discharge Plan     Row Name 03/16/20 1421       Plan    Plan  From home, expects to return home with spouse.    Plan Comments  NPO with NG tube.                  Demographic Summary     Row Name 03/16/20 1416       General Information    Admission Type  inpatient    Arrived From  emergency department    Required Notices Provided  Important Message from Medicare    Referral Source  admission list    Reason for Consult  discharge planning    Preferred Language  English     Used During This Interaction  stevie Serrano RN

## 2020-03-16 NOTE — CONSULTS
LOS: 1 day   Patient Care Team:  Yusuf Jones MD as PCP - General  Joshua Yip MD as Consulting Physician (Cardiology)    Chief Complaint: No increasing pain.  No bowel movement.  Interval History:   Patient Denies: Vomiting  History taken from: Patient      Objective     Vital Signs  Temp:  [97.7 °F (36.5 °C)-98.6 °F (37 °C)] 97.7 °F (36.5 °C)  Heart Rate:  [70-78] 70  Resp:  [12-24] 16  BP: (130-172)/(63-87) 172/87    Intake/Output       03/15/20 0701 - 03/16/20 0700      [id number] 2584-1708 Total       Intake    P.O.  --  0 0    IV Piggyback  100  -- 100    Total Intake 100 0 100       Output    Urine  --  550 550    Emesis/NG output  900  0 900    Total Output           Physical Exam:   Heart: RR   Lungs:  CTA B   Abd: Nontender nondistended.  Large ventral hernia with loss of domain.   Ext:  No clubbing, cyanosis, edema     Results Review:     I reviewed the patient's new clinical results.    Lab Results (last 24 hours)     Procedure Component Value Units Date/Time    BNP [[id number]]  (Normal) Collected:  03/16/20 0349    Specimen:  Blood Updated:  03/16/20 1148     proBNP 490.5 pg/mL     Narrative:       Among patients with dyspnea, NT-proBNP is highly sensitive for the detection of acute congestive heart failure. In addition NT-proBNP of <300 pg/ml effectively rules out acute congestive heart failure with 99% negative predictive value.    Results may be falsely decreased if patient taking Biotin.      Comprehensive Metabolic Panel [[id number]]  (Abnormal) Collected:  03/16/20 0349    Specimen:  Blood Updated:  03/16/20 0550     Glucose 131 mg/dL      BUN 22 mg/dL      Creatinine 1.31 mg/dL      Sodium 138 mmol/L      Potassium 4.7 mmol/L      Chloride 101 mmol/L      CO2 28.0 mmol/L      Calcium 8.1 mg/dL      Total Protein 5.6 g/dL      Albumin 3.20 g/dL      ALT (SGPT) 57 U/L      AST (SGOT) 195 U/L      Alkaline Phosphatase 216 U/L      Total Bilirubin 2.6 mg/dL      eGFR Non   Amer 54 mL/min/1.73      Globulin 2.4 gm/dL      A/G Ratio 1.3 g/dL      BUN/Creatinine Ratio 16.8     Anion Gap 9.0 mmol/L     Narrative:       GFR Normal >60  Chronic Kidney Disease <60  Kidney Failure <15      CBC & Differential [[id number]] Collected:  03/16/20 0349    Specimen:  Blood Updated:  03/16/20 0503    Narrative:       The following orders were created for panel order CBC & Differential.  Procedure                               Abnormality         Status                     ---------                               -----------         ------                     CBC Auto Differential[[id number]]        Abnormal            Final result                 Please view results for these tests on the individual orders.    CBC Auto Differential [[id number]]  (Abnormal) Collected:  03/16/20 0349    Specimen:  Blood Updated:  03/16/20 0503     WBC 5.80 10*3/mm3      RBC 4.44 10*6/mm3      Hemoglobin 14.0 g/dL      Hematocrit 40.8 %      MCV 91.9 fL      MCH 31.6 pg      MCHC 34.4 g/dL      RDW 15.5 %      RDW-SD 50.3 fl      MPV 7.8 fL      Platelets 260 10*3/mm3      Neutrophil % 71.5 %      Lymphocyte % 14.9 %      Monocyte % 8.4 %      Eosinophil % 3.5 %      Basophil % 1.7 %      Neutrophils, Absolute 4.10 10*3/mm3      Lymphocytes, Absolute 0.90 10*3/mm3      Monocytes, Absolute 0.50 10*3/mm3      Eosinophils, Absolute 0.20 10*3/mm3      Basophils, Absolute 0.10 10*3/mm3      nRBC 0.0 /100 WBC     Comprehensive Metabolic Panel [[id number]]  (Abnormal) Collected:  03/15/20 1925    Specimen:  Blood Updated:  03/15/20 1957     Glucose 114 mg/dL      BUN 24 mg/dL      Creatinine 1.23 mg/dL      Sodium 135 mmol/L      Potassium 4.5 mmol/L      Chloride 102 mmol/L      CO2 26.0 mmol/L      Calcium 8.4 mg/dL      Total Protein 5.7 g/dL      Albumin 3.50 g/dL      ALT (SGPT) 7 U/L      AST (SGOT) 12 U/L      Alkaline Phosphatase 112 U/L      Total Bilirubin 1.0 mg/dL      eGFR Non African Amer 58 mL/min/1.73       Globulin 2.2 gm/dL      A/G Ratio 1.6 g/dL      BUN/Creatinine Ratio 19.5     Anion Gap 7.0 mmol/L     Narrative:       GFR Normal >60  Chronic Kidney Disease <60  Kidney Failure <15            Assessment/Plan:    Small bowel obstruction due to adhesions (CMS/HCC)      74 y.o. male  Admitted with small bowel obstruction.  CT scan shows evidence of very small amount of free air pneumatosis however his exam is benign he has no leukocytosis.  I am not convinced he has any bowel perforation given his clinical picture.  Furthermore exploration would be treacherous and likely to cause more harm.  Continue bowel rest today and will plan on getting a repeat CT scan with oral contrast tomorrow.    Cleopatra Wang MD  General Surgeon  03/16/20

## 2020-03-16 NOTE — CONSULTS
[facility] HEART SPECIALISTS CONSULT        Subjective:     Encounter Date:03/15/2020      Patient ID: Ro Nathan is a 74 y.o. male.    Chief Complaint: vomiting      HPI:  Ro Nathan is a 74 y.o. male known to myself very well with a past cardiac history of CAD s/p PCI with MICHAEL to LAD 12/5/19, permanent afib (not on AC secondary to hx GIB significantly in the past), and PPM followed by Dr. Watson.  Pt has a PMH of HTN, HLD, DM, and COPD.  Patient presented for coffee ground emesis and found with a SBO.  Surgery has been consulted and planning conservative management at this point.  Cardiology was consulted given recent PCI.  Patient denies any recent CP.  He reports intermittent GIORDANO.  Denies PND/orthopnea and notes mild RLE edema.  He presents with small bowel obstruction with numerous intra-abdominal surgeries and adhesions with high risk for intra-abdominal surgery with ventral hernia and structural instability of the abdominal wall.  Currently he has been treated conservatively with intravenous fluids, rest, n.p.o. status.  Overall he said his belly does feel better and he is passing gas on my encounter.  We are concerned about his antiplatelet therapy with proximal ostial LAD stent placed approximately 3 months ago.  We will continue to give his Effient orally daily with clamping of his NG tube for 45 minutes to 1 hour with likely GI motility reduced in addition to his low-dose aspirin therapy.  No indication for heparinization or anticoagulation as discussed with the patient today.  He has having no chest pain or shortness of breath or cardiovascular symptoms presently.    Review of systems x14 point review of systems is negative except as mentioned above specifically with his abdominal pain and small bowel obstruction and vomiting of bilious GI contents    Past Medical History:   Diagnosis Date   • Bruises easily    • CHF (congestive heart failure) (CMS/[facility])    • Congenital heart defect    •  Difficulty attaining erection    • Hypertension    • Low back pain    • Poor circulation    • Prostate cancer (CMS/HCC)    • Shortness of breath    • Stented coronary artery 12/05/2019    MICHAEL to LAD         Past Surgical History:   Procedure Laterality Date   • APPENDECTOMY     • CARDIAC CATHETERIZATION N/A 12/5/2019    Procedure: Left Heart Cath;  Surgeon: Mirza Munson MD;  Location: [facility] CATH INVASIVE LOCATION;  Service: Cardiology   • CARDIAC CATHETERIZATION N/A 12/5/2019    Procedure: Stent MICHAEL coronary;  Surgeon: Mirza Munson MD;  Location: [facility] CATH INVASIVE LOCATION;  Service: Cardiology   • CHOLECYSTECTOMY     • ELBOW PROCEDURE      left   • HERNIA REPAIR     • INTERVENTIONAL RADIOLOGY PROCEDURE N/A 12/5/2019    Procedure: Intravascular Ultrasound;  Surgeon: Mirza Munson MD;  Location: [facility] CATH INVASIVE LOCATION;  Service: Cardiology   • CO RT/LT HEART CATHETERS N/A 12/5/2019    Procedure: Percutaneous Coronary Intervention;  Surgeon: Mirza Munson MD;  Location: [facility] CATH INVASIVE LOCATION;  Service: Cardiology         Social History     Socioeconomic History   • Marital status:      Spouse name: Not on file   • Number of children: Not on file   • Years of education: Not on file   • Highest education level: Not on file   Tobacco Use   • Smoking status: Former Smoker   • Smokeless tobacco: Never Used   Substance and Sexual Activity   • Alcohol use: Not Currently     Frequency: Never   • Drug use: No   • Sexual activity: Defer         Allergies   Allergen Reactions   • Haloperidol Hives   • Morphine Itching   • Pantoprazole Other (See Comments)     Feels sick after receiving       Current Medications:   Scheduled Meds:  [START ON 3/18/2020] !Vancomycin Level Draw Needed  Does not apply Once   [START ON 3/19/2020] !Vancomycin Level Draw Needed  Does not apply Once   ampicillin-sulbactam 3 g Intravenous Q6H   famotidine 20 mg Intravenous  "Q12H   sodium chloride 10 mL Intravenous Q12H   vancomycin 15 mg/kg (Adjusted) Intravenous Q24H     Continuous Infusions:  lactated ringers 100 mL/hr Last Rate: 100 mL/hr (03/15/20 2014)   Pharmacy to dose vancomycin         ROS  All other systems reviewed and are negative.       Objective:         /73 (BP Location: Right arm, Patient Position: Lying)   Pulse 70   Temp 97.8 °F (36.6 °C) (Oral)   Resp 16   Ht 188 cm (74\")   Wt 123 kg (270 lb 4.5 oz)   SpO2 95%   BMI 34.70 kg/m²       General: Well-developed in NAD.  Neuro: AAOx3.  No focal neurologic deficits grossly  HEENT: sclera clear, no xanthalasmas.  CV: S1S2 RRR. No murmurs or gallops. Neck thick.  No obvious JVD.  Resp: Breathing is unlabored. Lungs faint +wheezing throughout.  Delayed expiratory phase  GI: BS+.  Abdomen distended, tender, there are some bowel sounds but hypoactive, ventral hernia  Ext: Pedal pulses are palpable. Extremities are non-edematous.  MS: moves all extremities, no weakness.  Skin: warm, dry.  Psych: calm and cooperative.          LFTs are elevated as below  Lab Review:     Results from last 7 days   Lab Units 03/16/20  0349 03/15/20  1925   SODIUM mmol/L 138 135*   POTASSIUM mmol/L 4.7 4.5   CHLORIDE mmol/L 101 102   CO2 mmol/L 28.0 26.0   BUN mg/dL 22 24*   CREATININE mg/dL 1.31* 1.23   GLUCOSE mg/dL 131* 114*   CALCIUM mg/dL 8.1* 8.4*   AST (SGOT) U/L 195* 12   ALT (SGPT) U/L 57* 7         Results from last 7 days   Lab Units 03/16/20  0349 03/15/20  1336   WBC 10*3/mm3 5.80 10.50   HEMOGLOBIN g/dL 14.0 16.6   HEMATOCRIT % 40.8 47.0   PLATELETS 10*3/mm3 260 371     Results from last 7 days   Lab Units 03/15/20  1336   INR  1.09   APTT seconds 24.5               Invalid input(s): LDLCALC            Recent Radiology:  Imaging Results (Most Recent)     Procedure Component Value Units Date/Time    CT Abdomen Pelvis With Contrast [[id number]] Collected:  03/15/20 1553     Updated:  03/15/20 1605    Narrative:       DATE OF " EXAM:  3/15/2020 3:18 PM     PROCEDURE:  CT ABDOMEN PELVIS W CONTRAST-     INDICATIONS:   Lower abdominal pain, vomiting bile and feces, suspected bowel  obstruction.     COMPARISON:   06/11/2015.     TECHNIQUE:  Routine transaxial slices were obtained through the abdomen and pelvis  after the intravenous administration of 50 mL of Isovue 370.  Reconstructed coronal and sagittal images were also obtained. Automated  exposure control and iterative construction methods were used.     FINDINGS:  There is a nasogastric tube within the stomach which is decompressed.  The duodenum and proximal small bowel are markedly fluid-filled and  dilated. This is consistent with a high-grade bowel obstruction. The  transition point is located within the mid pelvis and best demonstrated  on image 113. There is pneumatosis within the dilated loops of small  bowel in the anterior aspect of the abdomen and there is a tiny amount  of free air in the anterior abdomen best demonstrated on images 70-75  consistent with a perforated viscus. There is marked diastases of the  rectus abdominis muscle similar to before. The appendix is not  visualized and is probably surgically absent. The gallbladder is  surgically absent. The liver, adrenal glands, pancreas, and spleen are  normal. There is a small staghorn calculus in the interpolar region of  the right kidney measuring 13 mm. There are also additional calculi seen  within the inferior pole. The right kidney is unremarkable. There are  atherosclerotic vascular calcifications with within the abdomen and  pelvis. The abdominal aorta is ectatic. There are radioactive seed  implants in the prostate gland. The seminal vesicles and urinary bladder  are normal. There is a mild right basilar subsegmental atelectasis. The  left lung base is clear. There are no suspicious osteolytic or sclerotic  lesions within the bony structures.        Impression:          1. There are findings consistent with a  high-grade small bowel  obstruction. The patient also has pneumatosis within the dilated loops  of small bowel as well as free air. The findings were discussed with Dr. Malachi Du M.D. by telephone.  2. Marked diastases of the rectus abdominous muscle similar to before.  3. Right renal stones. There is a 13 mm staghorn calculus in the  interpolar region of the right kidney.  4. The nasogastric tube tip terminates within the stomach which is  decompressed.  5. Additional findings as noted above.     Electronically Signed By-Yusuf Gray On:3/15/2020 4:03 PM  This report was finalized on [id number] by  Yusuf Gray, .            ECHOCARDIOGRAM:    Results for orders placed during the hospital encounter of 12/03/19   Adult Transthoracic Echo Complete W/ Cont if Necessary Per Protocol    Narrative · Mild mitral valve regurgitation is present  · Mild dilation of the sinuses of Valsalva is present.  · Left ventricular systolic function is hyperdynamic (EF > 70).  · Left atrial cavity size is severely dilated.  · Right ventricular cavity is mildly dilated.  · Left ventricular diastolic dysfunction (grade II) consistent with   pseudonormalization.     Overall normal LV systolic function and size  Normal RV function, mild RV enlargement  Severe left atrial enlargement  Diastolic dysfunction indeterminate by criteria  No masses  No effusion seen  EF hyperdynamic greater than 70%  No significant valvulopathy seen               Assessment:         Active Hospital Problems    Diagnosis POA   • Small bowel obstruction due to adhesions (CMS/HCC) [K56.50] Yes     1) SBO  - conservative management per surgery  IV fluids, n.p.o. status, bowel rest, last resort would be surgery with ventral hernia and abdominal wall instability    2) CAD s/p PCI with MICHAEL to LAD 12/5/20  - continue on uninterrupted dual antiplatelet therapy with aspirin and effient, clamp NG tube for 45 minutes to 1 hour after administration of possible to enhance  absorption and low motility state    3) permanent afib s/p PPM  - not on AC secondary to hx GIB  - 2D echo 12/4 showed EF > 70% with grade 2 diastolic dysfunction, mild MR.    4) HTN  - controlled presently, will institute IV hydralazine or Cardene drip if needed as presently he is n.p.o.    5) HLD    6) DM    7) COPD    8) Hx liver cirrhosis           Plan:   Patient with recent PCI with MICHAEL 12/5/20, so must continue uninterrupted dual antiplatelet therapy with aspirin and effient to prevent in-stent thrombosis with clamping of NG tube 45 minutes to 1 hour..  Continue pt on IVFs while NPO.  Hx grade 2 diastolic dysfunction and will monitor I/Os and daily weights.  Start IV hydralazine, labetalol or Cardene if needed for hypertension       Electronically signed by KOURTNEY Evangelista, 03/16/20, 11:11 AM.

## 2020-03-16 NOTE — PROGRESS NOTES
LOS: 1 day   Patient Care Team:  Yusuf Jones MD as PCP - General  Joshua Yip MD as Consulting Physician (Cardiology)    Chief Complaint: No increasing pain.  No bowel movement.  Interval History:   Patient Denies: Vomiting  History taken from: Patient      Objective     Vital Signs  Temp:  [97.7 °F (36.5 °C)-98.6 °F (37 °C)] 97.7 °F (36.5 °C)  Heart Rate:  [70-78] 70  Resp:  [12-24] 16  BP: (130-172)/(63-87) 172/87    Intake/Output       03/15/20 0701 - 03/16/20 0700      5762-8556 1577-3714 Total       Intake    P.O.  --  0 0    IV Piggyback  100  -- 100    Total Intake 100 0 100       Output    Urine  --  550 550    Emesis/NG output  900  0 900    Total Output           Physical Exam:   Heart: RR   Lungs:  CTA B   Abd: Nontender nondistended.  Large ventral hernia with loss of domain.   Ext:  No clubbing, cyanosis, edema     Results Review:     I reviewed the patient's new clinical results.    Lab Results (last 24 hours)     Procedure Component Value Units Date/Time    BNP [[id number]]  (Normal) Collected:  03/16/20 0349    Specimen:  Blood Updated:  03/16/20 1148     proBNP 490.5 pg/mL     Narrative:       Among patients with dyspnea, NT-proBNP is highly sensitive for the detection of acute congestive heart failure. In addition NT-proBNP of <300 pg/ml effectively rules out acute congestive heart failure with 99% negative predictive value.    Results may be falsely decreased if patient taking Biotin.      Comprehensive Metabolic Panel [[id number]]  (Abnormal) Collected:  03/16/20 0349    Specimen:  Blood Updated:  03/16/20 0550     Glucose 131 mg/dL      BUN 22 mg/dL      Creatinine 1.31 mg/dL      Sodium 138 mmol/L      Potassium 4.7 mmol/L      Chloride 101 mmol/L      CO2 28.0 mmol/L      Calcium 8.1 mg/dL      Total Protein 5.6 g/dL      Albumin 3.20 g/dL      ALT (SGPT) 57 U/L      AST (SGOT) 195 U/L      Alkaline Phosphatase 216 U/L      Total Bilirubin 2.6 mg/dL      eGFR Non   Amer 54 mL/min/1.73      Globulin 2.4 gm/dL      A/G Ratio 1.3 g/dL      BUN/Creatinine Ratio 16.8     Anion Gap 9.0 mmol/L     Narrative:       GFR Normal >60  Chronic Kidney Disease <60  Kidney Failure <15      CBC & Differential [[id number]] Collected:  03/16/20 0349    Specimen:  Blood Updated:  03/16/20 0503    Narrative:       The following orders were created for panel order CBC & Differential.  Procedure                               Abnormality         Status                     ---------                               -----------         ------                     CBC Auto Differential[[id number]]        Abnormal            Final result                 Please view results for these tests on the individual orders.    CBC Auto Differential [[id number]]  (Abnormal) Collected:  03/16/20 0349    Specimen:  Blood Updated:  03/16/20 0503     WBC 5.80 10*3/mm3      RBC 4.44 10*6/mm3      Hemoglobin 14.0 g/dL      Hematocrit 40.8 %      MCV 91.9 fL      MCH 31.6 pg      MCHC 34.4 g/dL      RDW 15.5 %      RDW-SD 50.3 fl      MPV 7.8 fL      Platelets 260 10*3/mm3      Neutrophil % 71.5 %      Lymphocyte % 14.9 %      Monocyte % 8.4 %      Eosinophil % 3.5 %      Basophil % 1.7 %      Neutrophils, Absolute 4.10 10*3/mm3      Lymphocytes, Absolute 0.90 10*3/mm3      Monocytes, Absolute 0.50 10*3/mm3      Eosinophils, Absolute 0.20 10*3/mm3      Basophils, Absolute 0.10 10*3/mm3      nRBC 0.0 /100 WBC     Comprehensive Metabolic Panel [[id number]]  (Abnormal) Collected:  03/15/20 1925    Specimen:  Blood Updated:  03/15/20 1957     Glucose 114 mg/dL      BUN 24 mg/dL      Creatinine 1.23 mg/dL      Sodium 135 mmol/L      Potassium 4.5 mmol/L      Chloride 102 mmol/L      CO2 26.0 mmol/L      Calcium 8.4 mg/dL      Total Protein 5.7 g/dL      Albumin 3.50 g/dL      ALT (SGPT) 7 U/L      AST (SGOT) 12 U/L      Alkaline Phosphatase 112 U/L      Total Bilirubin 1.0 mg/dL      eGFR Non African Amer 58 mL/min/1.73       Globulin 2.2 gm/dL      A/G Ratio 1.6 g/dL      BUN/Creatinine Ratio 19.5     Anion Gap 7.0 mmol/L     Narrative:       GFR Normal >60  Chronic Kidney Disease <60  Kidney Failure <15            Assessment/Plan:    Small bowel obstruction due to adhesions (CMS/HCC)      74 y.o. male  Admitted with small bowel obstruction.  CT scan shows evidence of very small amount of free air pneumatosis however his exam is benign he has no leukocytosis.  I am not convinced he has any bowel perforation given his clinical picture.  Furthermore exploration would be treacherous and likely to cause more harm.  Continue bowel rest today and will plan on getting a repeat CT scan with oral contrast tomorrow.    Cleopatra Wang MD  General Surgeon  03/16/20

## 2020-03-17 ENCOUNTER — APPOINTMENT (OUTPATIENT)
Dept: CT IMAGING | Facility: HOSPITAL | Age: 75
End: 2020-03-17

## 2020-03-17 LAB
ALBUMIN SERPL-MCNC: 3.3 G/DL (ref 3.5–5.2)
ALBUMIN/GLOB SERPL: 1.4 G/DL
ALP SERPL-CCNC: 235 U/L (ref 39–117)
ALT SERPL W P-5'-P-CCNC: 69 U/L (ref 1–41)
ANION GAP SERPL CALCULATED.3IONS-SCNC: 11 MMOL/L (ref 5–15)
AST SERPL-CCNC: 95 U/L (ref 1–40)
BASOPHILS # BLD AUTO: 0.1 10*3/MM3 (ref 0–0.2)
BASOPHILS NFR BLD AUTO: 0.9 % (ref 0–1.5)
BILIRUB SERPL-MCNC: 1.1 MG/DL (ref 0.2–1.2)
BUN BLD-MCNC: 16 MG/DL (ref 8–23)
BUN/CREAT SERPL: 14.4 (ref 7–25)
CALCIUM SPEC-SCNC: 8.6 MG/DL (ref 8.6–10.5)
CHLORIDE SERPL-SCNC: 100 MMOL/L (ref 98–107)
CO2 SERPL-SCNC: 26 MMOL/L (ref 22–29)
CREAT BLD-MCNC: 1.11 MG/DL (ref 0.76–1.27)
DEPRECATED RDW RBC AUTO: 48.6 FL (ref 37–54)
EOSINOPHIL # BLD AUTO: 0.2 10*3/MM3 (ref 0–0.4)
EOSINOPHIL NFR BLD AUTO: 2.8 % (ref 0.3–6.2)
ERYTHROCYTE [DISTWIDTH] IN BLOOD BY AUTOMATED COUNT: 14.9 % (ref 12.3–15.4)
GFR SERPL CREATININE-BSD FRML MDRD: 65 ML/MIN/1.73
GLOBULIN UR ELPH-MCNC: 2.4 GM/DL
GLUCOSE BLD-MCNC: 90 MG/DL (ref 65–99)
HCT VFR BLD AUTO: 41.4 % (ref 37.5–51)
HGB BLD-MCNC: 14.3 G/DL (ref 13–17.7)
LYMPHOCYTES # BLD AUTO: 0.8 10*3/MM3 (ref 0.7–3.1)
LYMPHOCYTES NFR BLD AUTO: 12.3 % (ref 19.6–45.3)
MCH RBC QN AUTO: 31.9 PG (ref 26.6–33)
MCHC RBC AUTO-ENTMCNC: 34.4 G/DL (ref 31.5–35.7)
MCV RBC AUTO: 92.6 FL (ref 79–97)
MONOCYTES # BLD AUTO: 0.5 10*3/MM3 (ref 0.1–0.9)
MONOCYTES NFR BLD AUTO: 8.1 % (ref 5–12)
NEUTROPHILS # BLD AUTO: 4.7 10*3/MM3 (ref 1.7–7)
NEUTROPHILS NFR BLD AUTO: 75.9 % (ref 42.7–76)
NRBC BLD AUTO-RTO: 0.2 /100 WBC (ref 0–0.2)
PLATELET # BLD AUTO: 251 10*3/MM3 (ref 140–450)
PMV BLD AUTO: 7.9 FL (ref 6–12)
POTASSIUM BLD-SCNC: 4.3 MMOL/L (ref 3.5–5.2)
PROT SERPL-MCNC: 5.7 G/DL (ref 6–8.5)
RBC # BLD AUTO: 4.48 10*6/MM3 (ref 4.14–5.8)
SODIUM BLD-SCNC: 137 MMOL/L (ref 136–145)
WBC NRBC COR # BLD: 6.2 10*3/MM3 (ref 3.4–10.8)

## 2020-03-17 PROCEDURE — 0 IOPAMIDOL PER 1 ML: Performed by: FAMILY MEDICINE

## 2020-03-17 PROCEDURE — 80053 COMPREHEN METABOLIC PANEL: CPT | Performed by: INTERNAL MEDICINE

## 2020-03-17 PROCEDURE — 25010000002 VANCOMYCIN 10 G RECONSTITUTED SOLUTION: Performed by: INTERNAL MEDICINE

## 2020-03-17 PROCEDURE — 85025 COMPLETE CBC W/AUTO DIFF WBC: CPT | Performed by: INTERNAL MEDICINE

## 2020-03-17 PROCEDURE — 25010000002 HYDRALAZINE PER 20 MG: Performed by: INTERNAL MEDICINE

## 2020-03-17 PROCEDURE — 99233 SBSQ HOSP IP/OBS HIGH 50: CPT | Performed by: INTERNAL MEDICINE

## 2020-03-17 PROCEDURE — 25010000002 HYDROMORPHONE PER 4 MG: Performed by: INTERNAL MEDICINE

## 2020-03-17 PROCEDURE — 25010000002 FUROSEMIDE PER 20 MG: Performed by: INTERNAL MEDICINE

## 2020-03-17 PROCEDURE — 25010000002 ONDANSETRON PER 1 MG: Performed by: INTERNAL MEDICINE

## 2020-03-17 PROCEDURE — 74177 CT ABD & PELVIS W/CONTRAST: CPT

## 2020-03-17 PROCEDURE — 25010000003 AMPICILLIN-SULBACTAM PER 1.5 G: Performed by: INTERNAL MEDICINE

## 2020-03-17 PROCEDURE — 99233 SBSQ HOSP IP/OBS HIGH 50: CPT | Performed by: SURGERY

## 2020-03-17 RX ORDER — LABETALOL HYDROCHLORIDE 5 MG/ML
10 INJECTION, SOLUTION INTRAVENOUS EVERY 8 HOURS SCHEDULED
Status: DISCONTINUED | OUTPATIENT
Start: 2020-03-17 | End: 2020-03-19 | Stop reason: HOSPADM

## 2020-03-17 RX ORDER — FUROSEMIDE 10 MG/ML
20 INJECTION INTRAMUSCULAR; INTRAVENOUS DAILY
Status: DISCONTINUED | OUTPATIENT
Start: 2020-03-17 | End: 2020-03-18

## 2020-03-17 RX ADMIN — FAMOTIDINE 20 MG: 10 INJECTION INTRAVENOUS at 08:14

## 2020-03-17 RX ADMIN — ONDANSETRON 4 MG: 2 INJECTION INTRAMUSCULAR; INTRAVENOUS at 16:43

## 2020-03-17 RX ADMIN — ONDANSETRON 4 MG: 2 INJECTION INTRAMUSCULAR; INTRAVENOUS at 21:34

## 2020-03-17 RX ADMIN — Medication 10 ML: at 08:15

## 2020-03-17 RX ADMIN — HYDROMORPHONE HYDROCHLORIDE 0.5 MG: 2 INJECTION, SOLUTION INTRAMUSCULAR; INTRAVENOUS; SUBCUTANEOUS at 08:14

## 2020-03-17 RX ADMIN — HYDROMORPHONE HYDROCHLORIDE 0.5 MG: 2 INJECTION, SOLUTION INTRAMUSCULAR; INTRAVENOUS; SUBCUTANEOUS at 12:12

## 2020-03-17 RX ADMIN — AMPICILLIN AND SULBACTAM 3 G: 1; 2 INJECTION, POWDER, FOR SOLUTION INTRAMUSCULAR; INTRAVENOUS at 22:22

## 2020-03-17 RX ADMIN — LABETALOL 20 MG/4 ML (5 MG/ML) INTRAVENOUS SYRINGE 10 MG: at 12:09

## 2020-03-17 RX ADMIN — HYDROMORPHONE HYDROCHLORIDE 0.5 MG: 2 INJECTION, SOLUTION INTRAMUSCULAR; INTRAVENOUS; SUBCUTANEOUS at 21:19

## 2020-03-17 RX ADMIN — IOPAMIDOL 100 ML: 755 INJECTION, SOLUTION INTRAVENOUS at 11:00

## 2020-03-17 RX ADMIN — ONDANSETRON 4 MG: 2 INJECTION INTRAMUSCULAR; INTRAVENOUS at 12:12

## 2020-03-17 RX ADMIN — FAMOTIDINE 20 MG: 10 INJECTION INTRAVENOUS at 21:18

## 2020-03-17 RX ADMIN — HYDROMORPHONE HYDROCHLORIDE 0.5 MG: 2 INJECTION, SOLUTION INTRAMUSCULAR; INTRAVENOUS; SUBCUTANEOUS at 16:43

## 2020-03-17 RX ADMIN — VANCOMYCIN HYDROCHLORIDE 1500 MG: 10 INJECTION, POWDER, LYOPHILIZED, FOR SOLUTION INTRAVENOUS at 17:52

## 2020-03-17 RX ADMIN — AMPICILLIN AND SULBACTAM 3 G: 1; 2 INJECTION, POWDER, FOR SOLUTION INTRAMUSCULAR; INTRAVENOUS at 05:46

## 2020-03-17 RX ADMIN — AMPICILLIN AND SULBACTAM 3 G: 1; 2 INJECTION, POWDER, FOR SOLUTION INTRAMUSCULAR; INTRAVENOUS at 12:09

## 2020-03-17 RX ADMIN — AMPICILLIN AND SULBACTAM 3 G: 1; 2 INJECTION, POWDER, FOR SOLUTION INTRAMUSCULAR; INTRAVENOUS at 16:43

## 2020-03-17 RX ADMIN — HYDROMORPHONE HYDROCHLORIDE 0.5 MG: 2 INJECTION, SOLUTION INTRAMUSCULAR; INTRAVENOUS; SUBCUTANEOUS at 03:20

## 2020-03-17 RX ADMIN — FUROSEMIDE 20 MG: 10 INJECTION, SOLUTION INTRAMUSCULAR; INTRAVENOUS at 12:12

## 2020-03-17 RX ADMIN — ONDANSETRON 4 MG: 2 INJECTION INTRAMUSCULAR; INTRAVENOUS at 03:20

## 2020-03-17 RX ADMIN — Medication 10 ML: at 21:19

## 2020-03-17 RX ADMIN — ONDANSETRON 4 MG: 2 INJECTION INTRAMUSCULAR; INTRAVENOUS at 08:14

## 2020-03-17 RX ADMIN — LABETALOL 20 MG/4 ML (5 MG/ML) INTRAVENOUS SYRINGE 10 MG: at 21:18

## 2020-03-17 RX ADMIN — SODIUM CHLORIDE, SODIUM LACTATE, POTASSIUM CHLORIDE, AND CALCIUM CHLORIDE 100 ML/HR: 600; 310; 30; 20 INJECTION, SOLUTION INTRAVENOUS at 08:14

## 2020-03-17 RX ADMIN — ASPIRIN 81 MG 81 MG: 81 TABLET ORAL at 08:14

## 2020-03-17 RX ADMIN — HYDRALAZINE HYDROCHLORIDE 10 MG: 20 INJECTION INTRAMUSCULAR; INTRAVENOUS at 05:47

## 2020-03-17 RX ADMIN — PRASUGREL 10 MG: 10 TABLET, FILM COATED ORAL at 08:14

## 2020-03-17 NOTE — PROGRESS NOTES
LOS: 2 days   Patient Care Team:  Yusuf Jones MD as PCP - General  Joshua Yip MD as Consulting Physician (Cardiology)    Chief Complaint: large bm this am  Interval History:   Patient Denies: Vomiting  History taken from: Patient      Objective     Vital Signs  Temp:  [97.4 °F (36.3 °C)-97.9 °F (36.6 °C)] 97.9 °F (36.6 °C)  Heart Rate:  [68-74] 74  Resp:  [13-16] 13  BP: (172-187)/(87-97) 187/97  Intake/Output       03/15/20 0701 - 03/16/20 0700 03/16/20 0701 - 03/17/20 0700      [id number] 0886-8020 Total 0262-9873 5594-1056 Total       Intake    P.O.  --  0 0  --  -- --    IV Piggyback  100  -- 100  --  -- --    Total Intake 100 0 100 -- -- --       Output    Urine  --  550 550  200  300 500    Emesis/NG output  900  0 900  200  400 600    Total Output              Physical Exam:   Heart: RR   Lungs:  CTA B   Abd: nt, large ventral hernia with loss of domain   Ext:  No clubbing, cyanosis, edema     Results Review:     I reviewed the patient's new clinical results.    Lab Results (last 24 hours)     Procedure Component Value Units Date/Time    Comprehensive Metabolic Panel [[id number]]  (Abnormal) Collected:  03/17/20 0352    Specimen:  Blood Updated:  03/17/20 0537     Glucose 90 mg/dL      BUN 16 mg/dL      Creatinine 1.11 mg/dL      Sodium 137 mmol/L      Potassium 4.3 mmol/L      Chloride 100 mmol/L      CO2 26.0 mmol/L      Calcium 8.6 mg/dL      Total Protein 5.7 g/dL      Albumin 3.30 g/dL      ALT (SGPT) 69 U/L      AST (SGOT) 95 U/L      Alkaline Phosphatase 235 U/L      Total Bilirubin 1.1 mg/dL      eGFR Non African Amer 65 mL/min/1.73      Globulin 2.4 gm/dL      A/G Ratio 1.4 g/dL      BUN/Creatinine Ratio 14.4     Anion Gap 11.0 mmol/L     Narrative:       GFR Normal >60  Chronic Kidney Disease <60  Kidney Failure <15      CBC & Differential [[id number]] Collected:  03/17/20 0352    Specimen:  Blood Updated:  03/17/20 0500    Narrative:       The following  orders were created for panel order CBC & Differential.  Procedure                               Abnormality         Status                     ---------                               -----------         ------                     CBC Auto Differential[[id number]]        Abnormal            Final result                 Please view results for these tests on the individual orders.    CBC Auto Differential [[id number]]  (Abnormal) Collected:  03/17/20 0352    Specimen:  Blood Updated:  03/17/20 0500     WBC 6.20 10*3/mm3      RBC 4.48 10*6/mm3      Hemoglobin 14.3 g/dL      Hematocrit 41.4 %      MCV 92.6 fL      MCH 31.9 pg      MCHC 34.4 g/dL      RDW 14.9 %      RDW-SD 48.6 fl      MPV 7.9 fL      Platelets 251 10*3/mm3      Neutrophil % 75.9 %      Lymphocyte % 12.3 %      Monocyte % 8.1 %      Eosinophil % 2.8 %      Basophil % 0.9 %      Neutrophils, Absolute 4.70 10*3/mm3      Lymphocytes, Absolute 0.80 10*3/mm3      Monocytes, Absolute 0.50 10*3/mm3      Eosinophils, Absolute 0.20 10*3/mm3      Basophils, Absolute 0.10 10*3/mm3      nRBC 0.2 /100 WBC     BNP [[id number]]  (Normal) Collected:  03/16/20 0349    Specimen:  Blood Updated:  03/16/20 1148     proBNP 490.5 pg/mL     Narrative:       Among patients with dyspnea, NT-proBNP is highly sensitive for the detection of acute congestive heart failure. In addition NT-proBNP of <300 pg/ml effectively rules out acute congestive heart failure with 99% negative predictive value.    Results may be falsely decreased if patient taking Biotin.            Assessment/Plan:    Small bowel obstruction due to adhesions (CMS/HCC)      74 y.o. male    History of mutiple abd surgery and large ventral hernia with loss of domain, admitted with small amount of pneumatosis and free air but had no leukocytosis or tenderness on exam.  Today is actually had a bowel movement.  Still awaiting repeat CT scan just to ensure an improvement radiographically.  Cleopatra Wang MD  General  Surgeon  03/17/20

## 2020-03-17 NOTE — CONSULTS
[facility] CONSULT        Subjective:     Encounter Date:03/15/2020      Patient ID: Ro Nathan is a 74 y.o. male.    Chief Complaint: vomiting      HPI: 3-  Ro Nathan is a 74 y.o. male known to myself very well with a past cardiac history of CAD s/p PCI with MICHAEL to LAD 12/5/19, permanent afib (not on AC secondary to hx GIB significantly in the past), and PPM followed by Dr. Watson.  Pt has a PMH of HTN, HLD, DM, and COPD.  Patient presented for coffee ground emesis and found with a SBO.  Surgery has been consulted and planning conservative management at this point.  Cardiology was consulted given recent PCI.  Patient denies any recent CP.  He reports intermittent GIORDANO.  Denies PND/orthopnea and notes mild RLE edema.  He presents with small bowel obstruction with numerous intra-abdominal surgeries and adhesions with high risk for intra-abdominal surgery with ventral hernia and structural instability of the abdominal wall.  Currently he has been treated conservatively with intravenous fluids, rest, n.p.o. status.  Overall he said his belly does feel better and he is passing gas on my encounter.  We are concerned about his antiplatelet therapy with proximal ostial LAD stent placed approximately 3 months ago.  We will continue to give his Effient orally daily with clamping of his NG tube for 45 minutes to 1 hour with likely GI motility reduced in addition to his low-dose aspirin therapy.  No indication for heparinization or anticoagulation as discussed with the patient today.  He has having no chest pain or shortness of breath or cardiovascular symptoms presently.    ============================================================================  Today 3-17-20  Seen and examined today, continues with abdominal pain, intermittent flatus, not with bowel movements yet, no vomiting overnight.  No fevers chills sweats reported.  No chest pain reported.  Tolerating Effient and aspirin.   Remains hypertensive as he is off his antihypertensives.  Some of this could be in response to pain as well.  Discussed with him on IV changes to his antihypertensives to keep him less than 150 systolic acutely.      Review of systems x14 point review of systems is negative except as mentioned above specifically with his abdominal pain and small bowel obstruction and vomiting of bilious GI contents    Past Medical History:   Diagnosis Date   • Bruises easily    • CHF (congestive heart failure) (CMS/[facility])    • Congenital heart defect    • Difficulty attaining erection    • Hypertension    • Low back pain    • Poor circulation    • Prostate cancer (CMS/[facility])    • Shortness of breath    • Stented coronary artery 12/05/2019    MICHAEL to LAD         Past Surgical History:   Procedure Laterality Date   • APPENDECTOMY     • CARDIAC CATHETERIZATION N/A 12/5/2019    Procedure: Left Heart Cath;  Surgeon: Mirza Munson MD;  Location: [facility] CATH INVASIVE LOCATION;  Service: Cardiology   • CARDIAC CATHETERIZATION N/A 12/5/2019    Procedure: Stent MICHAEL coronary;  Surgeon: Mirza Munson MD;  Location: [facility] CATH INVASIVE LOCATION;  Service: Cardiology   • CHOLECYSTECTOMY     • ELBOW PROCEDURE      left   • HERNIA REPAIR     • INTERVENTIONAL RADIOLOGY PROCEDURE N/A 12/5/2019    Procedure: Intravascular Ultrasound;  Surgeon: Mirza Munson MD;  Location: [facility] CATH INVASIVE LOCATION;  Service: Cardiology   • OH RT/LT HEART CATHETERS N/A 12/5/2019    Procedure: Percutaneous Coronary Intervention;  Surgeon: Mirza Munson MD;  Location: [facility] CATH INVASIVE LOCATION;  Service: Cardiology         Social History     Socioeconomic History   • Marital status:      Spouse name: Not on file   • Number of children: Not on file   • Years of education: Not on file   • Highest education level: Not on file   Tobacco Use   • Smoking status: Former Smoker   • Smokeless tobacco: Never Used  "  Substance and Sexual Activity   • Alcohol use: Not Currently     Frequency: Never   • Drug use: No   • Sexual activity: Defer         Allergies   Allergen Reactions   • Haloperidol Hives   • Morphine Itching   • Pantoprazole Other (See Comments)     Feels sick after receiving       Current Medications:   Scheduled Meds:    [START ON 3/18/2020] !Vancomycin Level Draw Needed  Does not apply Once   [START ON 3/19/2020] !Vancomycin Level Draw Needed  Does not apply Once   ampicillin-sulbactam 3 g Intravenous Q6H   aspirin 81 mg Oral Daily   famotidine 20 mg Intravenous Q12H   prasugrel 10 mg Oral Daily   sodium chloride 10 mL Intravenous Q12H   vancomycin 15 mg/kg (Adjusted) Intravenous Q24H     Continuous Infusions:    lactated ringers 100 mL/hr Last Rate: 100 mL/hr (03/17/20 0814)   Pharmacy to dose vancomycin         ROS  All other systems reviewed and are negative.       Objective:         BP (!) 187/97 (BP Location: Right arm, Patient Position: Sitting)   Pulse 74   Temp 97.9 °F (36.6 °C) (Oral)   Resp 13   Ht 188 cm (74\")   Wt 124 kg (274 lb 7.6 oz)   SpO2 95%   BMI 35.24 kg/m²       General: Well-developed in NAD.  Neuro: AAOx3.  No focal neurologic deficits grossly  HEENT: sclera clear, no xanthalasmas.  CV: S1S2 RRR. No murmurs or gallops. Neck thick.  No obvious JVD.  Resp: Breathing is unlabored. Lungs faint +wheezing throughout.  Delayed expiratory phase  GI: BS+.  Abdomen distended, tender, there are some bowel sounds but hypoactive, ventral hernia  Ext: Pedal pulses are palpable. Extremities mild pitting edema trace to 1+.  MS: moves all extremities, no weakness.  Skin: warm, dry.  Normal cap refill, no ecchymoses seen  Psych: calm and cooperative.    Centrally unchanged from prior encounter          LFTs are elevated as below yesterday but improved today  Lab Review:     Results from last 7 days   Lab Units 03/17/20  0352 03/16/20  0349   SODIUM mmol/L 137 138   POTASSIUM mmol/L 4.3 4.7 "   CHLORIDE mmol/L 100 101   CO2 mmol/L 26.0 28.0   BUN mg/dL 16 22   CREATININE mg/dL 1.11 1.31*   GLUCOSE mg/dL 90 131*   CALCIUM mg/dL 8.6 8.1*   AST (SGOT) U/L 95* 195*   ALT (SGPT) U/L 69* 57*         Results from last 7 days   Lab Units 03/17/20  0352 03/16/20  0349   WBC 10*3/mm3 6.20 5.80   HEMOGLOBIN g/dL 14.3 14.0   HEMATOCRIT % 41.4 40.8   PLATELETS 10*3/mm3 251 260     Results from last 7 days   Lab Units 03/15/20  1336   INR  1.09   APTT seconds 24.5               Invalid input(s): LDLCALC  Results from last 7 days   Lab Units 03/16/20  0349   PROBNP pg/mL 490.5           Recent Radiology:  Imaging Results (Most Recent)     Procedure Component Value Units Date/Time    CT Abdomen Pelvis With Contrast [[id number]] Collected:  03/15/20 1553     Updated:  03/15/20 1605    Narrative:       DATE OF EXAM:  3/15/2020 3:18 PM     PROCEDURE:  CT ABDOMEN PELVIS W CONTRAST-     INDICATIONS:   Lower abdominal pain, vomiting bile and feces, suspected bowel  obstruction.     COMPARISON:   06/11/2015.     TECHNIQUE:  Routine transaxial slices were obtained through the abdomen and pelvis  after the intravenous administration of 50 mL of Isovue 370.  Reconstructed coronal and sagittal images were also obtained. Automated  exposure control and iterative construction methods were used.     FINDINGS:  There is a nasogastric tube within the stomach which is decompressed.  The duodenum and proximal small bowel are markedly fluid-filled and  dilated. This is consistent with a high-grade bowel obstruction. The  transition point is located within the mid pelvis and best demonstrated  on image 113. There is pneumatosis within the dilated loops of small  bowel in the anterior aspect of the abdomen and there is a tiny amount  of free air in the anterior abdomen best demonstrated on images 70-75  consistent with a perforated viscus. There is marked diastases of the  rectus abdominis muscle similar to before. The appendix is  not  visualized and is probably surgically absent. The gallbladder is  surgically absent. The liver, adrenal glands, pancreas, and spleen are  normal. There is a small staghorn calculus in the interpolar region of  the right kidney measuring 13 mm. There are also additional calculi seen  within the inferior pole. The right kidney is unremarkable. There are  atherosclerotic vascular calcifications with within the abdomen and  pelvis. The abdominal aorta is ectatic. There are radioactive seed  implants in the prostate gland. The seminal vesicles and urinary bladder  are normal. There is a mild right basilar subsegmental atelectasis. The  left lung base is clear. There are no suspicious osteolytic or sclerotic  lesions within the bony structures.        Impression:          1. There are findings consistent with a high-grade small bowel  obstruction. The patient also has pneumatosis within the dilated loops  of small bowel as well as free air. The findings were discussed with Dr. Malachi Du M.D. by telephone.  2. Marked diastases of the rectus abdominous muscle similar to before.  3. Right renal stones. There is a 13 mm staghorn calculus in the  interpolar region of the right kidney.  4. The nasogastric tube tip terminates within the stomach which is  decompressed.  5. Additional findings as noted above.     Electronically Signed By-Yusuf Gray On:3/15/2020 4:03 PM  This report was finalized on [id number] by  Yusuf Gray, .            ECHOCARDIOGRAM:    Results for orders placed during the hospital encounter of 12/03/19   Adult Transthoracic Echo Complete W/ Cont if Necessary Per Protocol    Narrative · Mild mitral valve regurgitation is present  · Mild dilation of the sinuses of Valsalva is present.  · Left ventricular systolic function is hyperdynamic (EF > 70).  · Left atrial cavity size is severely dilated.  · Right ventricular cavity is mildly dilated.  · Left ventricular diastolic dysfunction (grade II)  consistent with   pseudonormalization.     Overall normal LV systolic function and size  Normal RV function, mild RV enlargement  Severe left atrial enlargement  Diastolic dysfunction indeterminate by criteria  No masses  No effusion seen  EF hyperdynamic greater than 70%  No significant valvulopathy seen               Assessment:         Active Hospital Problems    Diagnosis POA   • Small bowel obstruction due to adhesions (CMS/HCC) [K56.50] Yes     1) SBO  - conservative management per surgery  IV fluids, n.p.o. status, bowel rest, last resort would be surgery with ventral hernia and abdominal wall instability    2) CAD s/p PCI with MICHAEL to LAD 12/5/20  - continue on uninterrupted dual antiplatelet therapy with aspirin and effient, clamp NG tube for 45 minutes to 1 hour after administration of possible to enhance absorption and low motility state    3) permanent afib s/p PPM  - not on AC secondary to hx GIB  - 2D echo 12/4 showed EF > 70% with grade 2 diastolic dysfunction, mild MR.    4) HTN  - controlled presently,   PRN hydralazine  Start labetalol 10 mg IV every 8 hours  Can institute IV Vasotec if needed or nitro drip if needed  Mild edema today with liberal IV fluids, known diastolic heart failure grade 2 diastolic dysfunction chronically, Lasix 20 mg IV daily, he was on 40 mg p.o. daily as outpatient    5) HLD    6) DM    7) COPD    8) Hx liver cirrhosis, elevated LFTs improved today           Plan:   Patient with recent PCI with MICHAEL 12/5/20, so must continue uninterrupted dual antiplatelet therapy with aspirin and effient to prevent in-stent thrombosis with clamping of NG tube 45 minutes to 1 hour.. IV fluids gently ongoing, start Lasix 20 mg IV daily, chronic diastolic congestive heart failure, would avoid volume overload which will prompt pulmonary edema if he is off everything.  Start labetalol 10 mg IV every 8 hours with hold orders  PRN IV hydralazine for any pressures greater than 160 sustained  Can  institute IV Vasotec as well if needed  Continue to follow and titrate medicines as clinically indicated, goal blood pressure for this patient acutely is less than 150 systolic avoid hypotension, avoid volume overload presently  Off statin as n.p.o.    Greater than 35 minutes were spent face-to-face with the patient today with greater than 50% of time spent on discussion on medication optimization as well as changes in therapeutic options and plan moving forward from CV standpoint.       It is a pleasure to be involved in his cardiovascular care.  Please call with any questions or concerns  Joshua Yip MD, PhD

## 2020-03-17 NOTE — PROGRESS NOTES
LOS: 2 days   Patient Care Team:  Yusuf Jones MD as PCP - General  Joshua Yip MD as Consulting Physician (Cardiology)    Subjective:  Feels better overall but remains quite miserable secondary to his nasogastric tube    Objective:       Afebrile  Review of Systems:   Review of Systems   Constitutional: Negative.    Respiratory: Negative.    Cardiovascular: Positive for leg swelling.   Gastrointestinal: Positive for abdominal distention and abdominal pain.           Vital Signs  Temp:  [97.4 °F (36.3 °C)-97.9 °F (36.6 °C)] 97.9 °F (36.6 °C)  Heart Rate:  [68-74] 74  Resp:  [13-16] 13  BP: (139-187)/(73-97) 187/97    Physical Exam:  Physical Exam   Constitutional: He appears well-developed.   HENT:   Head: Normocephalic and atraumatic.   Eyes: Pupils are equal, round, and reactive to light.   Cardiovascular: Regular rhythm.   Pulmonary/Chest: Effort normal.   Nursing note and vitals reviewed.       Radiology:  Ct Abdomen Pelvis With Contrast    Result Date: 3/15/2020   1. There are findings consistent with a high-grade small bowel obstruction. The patient also has pneumatosis within the dilated loops of small bowel as well as free air. The findings were discussed with Dr. Malachi Du M.D. by telephone. 2. Marked diastases of the rectus abdominous muscle similar to before. 3. Right renal stones. There is a 13 mm staghorn calculus in the interpolar region of the right kidney. 4. The nasogastric tube tip terminates within the stomach which is decompressed. 5. Additional findings as noted above.  Electronically Signed By-Yusuf Gray On:3/15/2020 4:03 PM This report was finalized on [id number] by  Yusuf Gray, .         Results Review:     I reviewed the patient's new clinical results.  I reviewed the patient's new imaging results and agree with the interpretation.    Medication Review:   Scheduled Meds:  [START ON 3/18/2020] !Vancomycin Level Draw Needed  Does not apply Once   [START ON  3/19/2020] !Vancomycin Level Draw Needed  Does not apply Once   ampicillin-sulbactam 3 g Intravenous Q6H   aspirin 81 mg Oral Daily   famotidine 20 mg Intravenous Q12H   prasugrel 10 mg Oral Daily   sodium chloride 10 mL Intravenous Q12H   vancomycin 15 mg/kg (Adjusted) Intravenous Q24H     Continuous Infusions:  lactated ringers 100 mL/hr Last Rate: 100 mL/hr (03/16/20 2102)   Pharmacy to dose vancomycin       PRN Meds:.hydrALAZINE  •  HYDROmorphone  •  ipratropium-albuterol  •  metoprolol tartrate  •  ondansetron  •  Pharmacy to dose vancomycin  •  sodium chloride    Labs:    CBC    Results from last 7 days   Lab Units 03/17/20  0352 03/16/20  0349 03/15/20  1336   WBC 10*3/mm3 6.20 5.80 10.50   HEMOGLOBIN g/dL 14.3 14.0 16.6   PLATELETS 10*3/mm3 251 260 371     BMP Results from last 7 days   Lab Units 03/17/20  0352 03/16/20  0349 03/15/20  1925 03/15/20  1336   SODIUM mmol/L 137 138 135* 139   POTASSIUM mmol/L 4.3 4.7 4.5 5.2   CHLORIDE mmol/L 100 101 102 97*   CO2 mmol/L 26.0 28.0 26.0 30.0*   BUN mg/dL 16 22 24* 29*   CREATININE mg/dL 1.11 1.31* 1.23 1.60*   GLUCOSE mg/dL 90 131* 114* 152*     Cr Clearance Estimated Creatinine Clearance: 83.2 mL/min (by C-G formula based on SCr of 1.11 mg/dL).  Coag   Results from last 7 days   Lab Units 03/15/20  1336   INR  1.09   APTT seconds 24.5     HbA1C No results found for: HGBA1C  Blood Glucose No results found for: POCGLU  Infection     CMP Results from last 7 days   Lab Units 03/17/20  0352 03/16/20  0349 03/15/20  1925 03/15/20  1336   SODIUM mmol/L 137 138 135* 139   POTASSIUM mmol/L 4.3 4.7 4.5 5.2   CHLORIDE mmol/L 100 101 102 97*   CO2 mmol/L 26.0 28.0 26.0 30.0*   BUN mg/dL 16 22 24* 29*   CREATININE mg/dL 1.11 1.31* 1.23 1.60*   GLUCOSE mg/dL 90 131* 114* 152*   ALBUMIN g/dL 3.30* 3.20* 3.50 4.10   BILIRUBIN mg/dL 1.1 2.6* 1.0 1.3*   ALK PHOS U/L 235* 216* 112 141*   AST (SGOT) U/L 95* 195* 12 13   ALT (SGPT) U/L 69* 57* 7 8   LIPASE U/L  --   --   --  10*            Radiology(recent) Ct Abdomen Pelvis With Contrast    Result Date: 3/15/2020   1. There are findings consistent with a high-grade small bowel obstruction. The patient also has pneumatosis within the dilated loops of small bowel as well as free air. The findings were discussed with Dr. Malachi Du M.D. by telephone. 2. Marked diastases of the rectus abdominous muscle similar to before. 3. Right renal stones. There is a 13 mm staghorn calculus in the interpolar region of the right kidney. 4. The nasogastric tube tip terminates within the stomach which is decompressed. 5. Additional findings as noted above.  Electronically Signed By-Yusuf Gray On:3/15/2020 4:03 PM This report was finalized on [id number] by  Yusuf Gray, .     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

## 2020-03-18 LAB
ALBUMIN SERPL-MCNC: 3.5 G/DL (ref 3.5–5.2)
ALBUMIN/GLOB SERPL: 1.7 G/DL
ALP SERPL-CCNC: 209 U/L (ref 39–117)
ALT SERPL W P-5'-P-CCNC: 43 U/L (ref 1–41)
ANION GAP SERPL CALCULATED.3IONS-SCNC: 12 MMOL/L (ref 5–15)
ANION GAP SERPL CALCULATED.3IONS-SCNC: 13 MMOL/L (ref 5–15)
AST SERPL-CCNC: 39 U/L (ref 1–40)
BASOPHILS # BLD AUTO: 0.1 10*3/MM3 (ref 0–0.2)
BASOPHILS NFR BLD AUTO: 0.9 % (ref 0–1.5)
BILIRUB SERPL-MCNC: 0.9 MG/DL (ref 0.2–1.2)
BUN BLD-MCNC: 16 MG/DL (ref 8–23)
BUN BLD-MCNC: 17 MG/DL (ref 8–23)
BUN/CREAT SERPL: 16 (ref 7–25)
BUN/CREAT SERPL: 16 (ref 7–25)
CALCIUM SPEC-SCNC: 8.5 MG/DL (ref 8.6–10.5)
CALCIUM SPEC-SCNC: 9 MG/DL (ref 8.6–10.5)
CHLORIDE SERPL-SCNC: 97 MMOL/L (ref 98–107)
CHLORIDE SERPL-SCNC: 98 MMOL/L (ref 98–107)
CO2 SERPL-SCNC: 26 MMOL/L (ref 22–29)
CO2 SERPL-SCNC: 27 MMOL/L (ref 22–29)
CREAT BLD-MCNC: 1 MG/DL (ref 0.76–1.27)
CREAT BLD-MCNC: 1.06 MG/DL (ref 0.76–1.27)
DEPRECATED RDW RBC AUTO: 48.1 FL (ref 37–54)
EOSINOPHIL # BLD AUTO: 0.1 10*3/MM3 (ref 0–0.4)
EOSINOPHIL NFR BLD AUTO: 2.1 % (ref 0.3–6.2)
ERYTHROCYTE [DISTWIDTH] IN BLOOD BY AUTOMATED COUNT: 15.1 % (ref 12.3–15.4)
GFR SERPL CREATININE-BSD FRML MDRD: 68 ML/MIN/1.73
GFR SERPL CREATININE-BSD FRML MDRD: 73 ML/MIN/1.73
GLOBULIN UR ELPH-MCNC: 2.1 GM/DL
GLUCOSE BLD-MCNC: 117 MG/DL (ref 65–99)
GLUCOSE BLD-MCNC: 118 MG/DL (ref 65–99)
HCT VFR BLD AUTO: 39.3 % (ref 37.5–51)
HGB BLD-MCNC: 13.8 G/DL (ref 13–17.7)
LYMPHOCYTES # BLD AUTO: 0.7 10*3/MM3 (ref 0.7–3.1)
LYMPHOCYTES NFR BLD AUTO: 9.4 % (ref 19.6–45.3)
MCH RBC QN AUTO: 31.9 PG (ref 26.6–33)
MCHC RBC AUTO-ENTMCNC: 35.1 G/DL (ref 31.5–35.7)
MCV RBC AUTO: 90.9 FL (ref 79–97)
MONOCYTES # BLD AUTO: 0.5 10*3/MM3 (ref 0.1–0.9)
MONOCYTES NFR BLD AUTO: 7.8 % (ref 5–12)
NEUTROPHILS # BLD AUTO: 5.6 10*3/MM3 (ref 1.7–7)
NEUTROPHILS NFR BLD AUTO: 79.8 % (ref 42.7–76)
NRBC BLD AUTO-RTO: 0.1 /100 WBC (ref 0–0.2)
PLATELET # BLD AUTO: 253 10*3/MM3 (ref 140–450)
PMV BLD AUTO: 7.7 FL (ref 6–12)
POTASSIUM BLD-SCNC: 3.9 MMOL/L (ref 3.5–5.2)
POTASSIUM BLD-SCNC: 4.6 MMOL/L (ref 3.5–5.2)
PROT SERPL-MCNC: 5.6 G/DL (ref 6–8.5)
RBC # BLD AUTO: 4.32 10*6/MM3 (ref 4.14–5.8)
SODIUM BLD-SCNC: 136 MMOL/L (ref 136–145)
SODIUM BLD-SCNC: 137 MMOL/L (ref 136–145)
WBC NRBC COR # BLD: 7 10*3/MM3 (ref 3.4–10.8)

## 2020-03-18 PROCEDURE — 25010000002 HYDROMORPHONE PER 4 MG: Performed by: INTERNAL MEDICINE

## 2020-03-18 PROCEDURE — 94799 UNLISTED PULMONARY SVC/PX: CPT

## 2020-03-18 PROCEDURE — 80053 COMPREHEN METABOLIC PANEL: CPT | Performed by: INTERNAL MEDICINE

## 2020-03-18 PROCEDURE — 99232 SBSQ HOSP IP/OBS MODERATE 35: CPT | Performed by: NURSE PRACTITIONER

## 2020-03-18 PROCEDURE — 25010000003 AMPICILLIN-SULBACTAM PER 1.5 G: Performed by: INTERNAL MEDICINE

## 2020-03-18 PROCEDURE — 99231 SBSQ HOSP IP/OBS SF/LOW 25: CPT | Performed by: SURGERY

## 2020-03-18 PROCEDURE — 85025 COMPLETE CBC W/AUTO DIFF WBC: CPT | Performed by: INTERNAL MEDICINE

## 2020-03-18 PROCEDURE — 25010000002 ONDANSETRON PER 1 MG: Performed by: INTERNAL MEDICINE

## 2020-03-18 PROCEDURE — 25010000002 FUROSEMIDE PER 20 MG: Performed by: INTERNAL MEDICINE

## 2020-03-18 RX ORDER — POLYETHYLENE GLYCOL 3350 17 G/17G
17 POWDER, FOR SOLUTION ORAL 2 TIMES DAILY
Status: DISCONTINUED | OUTPATIENT
Start: 2020-03-18 | End: 2020-03-19 | Stop reason: HOSPADM

## 2020-03-18 RX ORDER — DILTIAZEM HYDROCHLORIDE 120 MG/1
120 CAPSULE, COATED, EXTENDED RELEASE ORAL
Status: DISCONTINUED | OUTPATIENT
Start: 2020-03-18 | End: 2020-03-19 | Stop reason: HOSPADM

## 2020-03-18 RX ORDER — FUROSEMIDE 40 MG/1
40 TABLET ORAL DAILY
Status: DISCONTINUED | OUTPATIENT
Start: 2020-03-19 | End: 2020-03-19 | Stop reason: HOSPADM

## 2020-03-18 RX ORDER — DIGOXIN 250 MCG
250 TABLET ORAL
Status: DISCONTINUED | OUTPATIENT
Start: 2020-03-19 | End: 2020-03-19 | Stop reason: HOSPADM

## 2020-03-18 RX ORDER — DILTIAZEM HYDROCHLORIDE 120 MG/1
120 CAPSULE, COATED, EXTENDED RELEASE ORAL DAILY
Status: DISCONTINUED | OUTPATIENT
Start: 2020-03-19 | End: 2020-03-18

## 2020-03-18 RX ADMIN — FUROSEMIDE 20 MG: 10 INJECTION, SOLUTION INTRAMUSCULAR; INTRAVENOUS at 08:07

## 2020-03-18 RX ADMIN — LABETALOL 20 MG/4 ML (5 MG/ML) INTRAVENOUS SYRINGE 10 MG: at 21:51

## 2020-03-18 RX ADMIN — IPRATROPIUM BROMIDE AND ALBUTEROL SULFATE 3 ML: .5; 3 SOLUTION RESPIRATORY (INHALATION) at 08:16

## 2020-03-18 RX ADMIN — FAMOTIDINE 20 MG: 10 INJECTION INTRAVENOUS at 08:08

## 2020-03-18 RX ADMIN — LABETALOL 20 MG/4 ML (5 MG/ML) INTRAVENOUS SYRINGE 10 MG: at 14:42

## 2020-03-18 RX ADMIN — ONDANSETRON 4 MG: 2 INJECTION INTRAMUSCULAR; INTRAVENOUS at 11:15

## 2020-03-18 RX ADMIN — HYDROMORPHONE HYDROCHLORIDE 0.5 MG: 2 INJECTION, SOLUTION INTRAMUSCULAR; INTRAVENOUS; SUBCUTANEOUS at 14:42

## 2020-03-18 RX ADMIN — AMPICILLIN AND SULBACTAM 3 G: 1; 2 INJECTION, POWDER, FOR SOLUTION INTRAMUSCULAR; INTRAVENOUS at 06:18

## 2020-03-18 RX ADMIN — HYDROMORPHONE HYDROCHLORIDE 0.5 MG: 2 INJECTION, SOLUTION INTRAMUSCULAR; INTRAVENOUS; SUBCUTANEOUS at 08:07

## 2020-03-18 RX ADMIN — HYDROMORPHONE HYDROCHLORIDE 0.5 MG: 2 INJECTION, SOLUTION INTRAMUSCULAR; INTRAVENOUS; SUBCUTANEOUS at 00:28

## 2020-03-18 RX ADMIN — SODIUM CHLORIDE, SODIUM LACTATE, POTASSIUM CHLORIDE, AND CALCIUM CHLORIDE 100 ML/HR: 600; 310; 30; 20 INJECTION, SOLUTION INTRAVENOUS at 10:15

## 2020-03-18 RX ADMIN — ONDANSETRON 4 MG: 2 INJECTION INTRAMUSCULAR; INTRAVENOUS at 01:45

## 2020-03-18 RX ADMIN — ASPIRIN 81 MG 81 MG: 81 TABLET ORAL at 08:07

## 2020-03-18 RX ADMIN — POLYETHYLENE GLYCOL 3350 17 G: 17 POWDER, FOR SOLUTION ORAL at 21:51

## 2020-03-18 RX ADMIN — PRASUGREL 10 MG: 10 TABLET, FILM COATED ORAL at 08:07

## 2020-03-18 RX ADMIN — HYDROMORPHONE HYDROCHLORIDE 0.5 MG: 2 INJECTION, SOLUTION INTRAMUSCULAR; INTRAVENOUS; SUBCUTANEOUS at 03:36

## 2020-03-18 RX ADMIN — HYDROMORPHONE HYDROCHLORIDE 0.5 MG: 2 INJECTION, SOLUTION INTRAMUSCULAR; INTRAVENOUS; SUBCUTANEOUS at 22:06

## 2020-03-18 RX ADMIN — HYDROMORPHONE HYDROCHLORIDE 0.5 MG: 2 INJECTION, SOLUTION INTRAMUSCULAR; INTRAVENOUS; SUBCUTANEOUS at 19:58

## 2020-03-18 RX ADMIN — LABETALOL 20 MG/4 ML (5 MG/ML) INTRAVENOUS SYRINGE 10 MG: at 06:20

## 2020-03-18 RX ADMIN — FAMOTIDINE 20 MG: 10 INJECTION INTRAVENOUS at 21:51

## 2020-03-18 RX ADMIN — ONDANSETRON 4 MG: 2 INJECTION INTRAMUSCULAR; INTRAVENOUS at 16:18

## 2020-03-18 RX ADMIN — AMPICILLIN AND SULBACTAM 3 G: 1; 2 INJECTION, POWDER, FOR SOLUTION INTRAMUSCULAR; INTRAVENOUS at 11:15

## 2020-03-18 RX ADMIN — HYDROMORPHONE HYDROCHLORIDE 0.5 MG: 2 INJECTION, SOLUTION INTRAMUSCULAR; INTRAVENOUS; SUBCUTANEOUS at 16:52

## 2020-03-18 RX ADMIN — Medication 10 ML: at 08:10

## 2020-03-18 RX ADMIN — DILTIAZEM HYDROCHLORIDE 120 MG: 120 CAPSULE, COATED, EXTENDED RELEASE ORAL at 11:15

## 2020-03-18 RX ADMIN — HYDROMORPHONE HYDROCHLORIDE 0.5 MG: 2 INJECTION, SOLUTION INTRAMUSCULAR; INTRAVENOUS; SUBCUTANEOUS at 11:23

## 2020-03-18 NOTE — PROGRESS NOTES
Given the coronavirus situation, I decided to call the nurse and discuss how the patient was doing.    Ambulating, nausea, some abd pain    No bm today, tolerated 70% of diet    He presently is on no laxatives.      Assessment and plan    74-year-old gentleman admitted with bowel obstruction.  He had a bowel movement yesterday and has been tolerating his diet.  He still having some nausea and some abdominal pain.  Will add MiraLAX twice a day.

## 2020-03-18 NOTE — PLAN OF CARE
Problem: Patient Care Overview  Goal: Plan of Care Review  Flowsheets  Taken 3/16/2020 1950 by Tanya Yeager LPN  Progress: no change  Taken 3/17/2020 2119 by Yanelis Villeda, RN  Plan of Care Reviewed With: patient  Taken 3/18/2020 0534 by Kirill Yoder LPN  Outcome Summary: pt having BM , pain cont to be a factor , IV prn given, VSS , Pt ambulating halls indepent , still nause feeling , will monitor     Problem: Pain, Chronic (Adult)  Goal: Acceptable Pain/Comfort Level and Functional Ability  Flowsheets (Taken 3/16/2020 0234 by Domitila White, RN)  Acceptable Pain/Comfort Level and Functional Ability: making progress toward outcome     Problem: Skin Injury Risk (Adult)  Goal: Skin Health and Integrity  Flowsheets (Taken 3/16/2020 0234 by Domitila White, RN)  Skin Health and Integrity: making progress toward outcome

## 2020-03-18 NOTE — PLAN OF CARE
Problem: Patient Care Overview  Goal: Plan of Care Review  Outcome: Ongoing (interventions implemented as appropriate)  Flowsheets  Taken 3/18/2020 9051  Progress: improving  Outcome Summary: Patient has had 2 BMs today, he has complained of abdominal pain and nausea. Medication was administered and successful. Patient has been walking halls. Patient is stable. Will continue to monitor.           Plan of Care Reviewed With: patient

## 2020-03-18 NOTE — PROGRESS NOTES
Continued Stay Note   [facility]     Patient Name: Ro Nathan  MRN: [id number]  Today's Date: 3/18/2020    Admit Date: 3/15/2020    Discharge Plan     Row Name 03/18/20 1341       Plan    Plan  Anticipate routine home     Plan Comments  DC barriers: IV abx, IV Lasix,             Manju Meyer RN

## 2020-03-18 NOTE — PROGRESS NOTES
[facility]        LOS:  LOS: 3 days   Patient Name: Ro Nathan  Age/Sex: 74 y.o. male  : 1945  MRN: [id number]    Day of Service: 20   Length of Stay: 3  Encounter Provider: KOURTNEY Evangelista  Place of Service: [facility] CARDIOLOGY  Patient Care Team:  Yusuf Jones MD as PCP - General  [facility]ing Phys[facility] (Cardiology)    Subjective:     Chief Complaint:  F/U CAD, HF    Subjective:   Patient reports no further N/V and has taken meds by mouth with clear liquids and planned to advance diet today.  He reports improved SOA.  Denies CP.    Current Medications:   Scheduled Meds:  !Vancomycin Level Draw Needed  Does not apply Once   [START ON 3/19/2020] !Vancomycin Level Draw Needed  Does not apply Once   ampicillin-sulbactam 3 g Intravenous Q6H   aspirin 81 mg Oral Daily   famotidine 20 mg Intravenous Q12H   furosemide 20 mg Intravenous Daily   labetalol 10 mg Intravenous Q8H   prasugrel 10 mg Oral Daily   sodium chloride 10 mL Intravenous Q12H   vancomycin 15 mg/kg (Adjusted) Intravenous Q24H     Continuous Infusions:  lactated ringers 100 mL/hr Last Rate: 100 mL/hr (20 0814)   Pharmacy to dose vancomycin         Allergies:  Allergies   Allergen Reactions   • Haloperidol Hives   • Morphine Itching   • Pantoprazole Other (See Comments)     Feels sick after receiving       ROS    Objective:     Temp:  [97.5 °F (36.4 °C)-98.4 °F (36.9 °C)] 97.5 °F (36.4 °C)  Heart Rate:  [70-79] 71  Resp:  [16-20] 20  BP: (155-183)/(74-96) 166/83     Intake/Output Summary (Last 24 hours) at 3/18/2020 0716  Last data filed at 3/18/2020 0419  Gross per 24 hour   Intake 898 ml   Output --   Net 898 ml     Body mass index is 32.78 kg/m².      20  0442 20  0453 20  0419   Weight: 123 kg (270 lb 4.5 oz) 124 kg (274 lb 7.6 oz) 116 kg (255 lb 4.8 oz)         Physical Exam:  Neuro:  CV:  Resp:  GI:  Ext:  Tele:  AAOx3  S1S2 RRR, grade 2/6 systolic murmur  Non-labored, +wheezing  BS+, abd obese  Pedal pulses palp, no edema  VPaced                                                   Lab Review:   Results from last 7 days   Lab Units 03/18/20  0515 03/17/20  2359 03/17/20  0352   SODIUM mmol/L 136 137 137   POTASSIUM mmol/L 3.9 4.6 4.3   CHLORIDE mmol/L 98 97* 100   CO2 mmol/L 26.0 27.0 26.0   BUN mg/dL 16 17 16   CREATININE mg/dL 1.00 1.06 1.11   GLUCOSE mg/dL 117* 118* 90   CALCIUM mg/dL 8.5* 9.0 8.6   AST (SGOT) U/L 39  --  95*   ALT (SGPT) U/L 43*  --  69*         Results from last 7 days   Lab Units 03/18/20  0515 03/17/20  0352   WBC 10*3/mm3 7.00 6.20   HEMOGLOBIN g/dL 13.8 14.3   HEMATOCRIT % 39.3 41.4   PLATELETS 10*3/mm3 253 251     Results from last 7 days   Lab Units 03/15/20  1336   INR  1.09   APTT seconds 24.5               Invalid input(s): LDLCALC  Results from last 7 days   Lab Units 03/16/20  0349   PROBNP pg/mL 490.5           Recent Radiology:  Imaging Results (Most Recent)     Procedure Component Value Units Date/Time    CT Abdomen Pelvis With Contrast [[id number]] Collected:  03/17/20 1127     Updated:  03/17/20 1152    Addenda:        Addendum. This sentence was left out of the   impression: Possible 15 mm right renal mass versus lobulated cortical  contour. Recommend further evaluation beginning with nonurgent renal  ultrasound.     Electronically Signed By-Yelena Natarajan On:3/17/2020 11:50 AM  This report was finalized on [id number] by  Yelena Natarajan, .  Signed:  03/17/20 1150 by Yelena Natarajan MD    Narrative:          DATE OF EXAM:  3/17/2020 10:59 AM     PROCEDURE:  CT ABDOMEN PELVIS W CONTRAST-     INDICATIONS:   reassess bowel obstruction; K56.50-Intestinal adhesions (bands),  unspecified as to partial versus complete obstruction;  R10.84-Generalized abdominal pain; R19.8-Other specified symptoms and  signs involving the digestive system and abdomen     COMPARISON:   CT abdomen pelvis  03/15/2020.     TECHNIQUE:  Routine transaxial slices were obtained through the abdomen and pelvis  after the intravenous administration of 100 mL of Isovue 370.  Reconstructed coronal and sagittal images were also obtained. Automated  exposure control and iterative construction methods were used.     FINDINGS:  The gallbladder is surgically absent. The pancreas is partially fatty  replaced. There is a possible enhancing mass in the lower right kidney  laterally measuring 15 mm on axial image 81. Left renal calculi are  redemonstrated, measuring up to 11 mm. The adrenal glands, spleen, and  liver appear unremarkable. Tip of the enteric tube terminates in the  gastric body. Previously seen small bowel distention has significantly  improved. Bowel loops remain adhered to the anterior abdominal wall,  presumably from adhesions. There is persistent extraluminal gas between  small bowel loops (for example, on axial image 86), as well as in the  nondependent abdomen (axial image 76). Urinary bladder appears  unremarkable. Diastases of the rectus muscles appears stable. No  significant free fluid or adenopathy is seen. 3.4 cm infrarenal  abdominal aortic aneurysm is stable (coronal image 82). Right lower lobe  bronchial wall thickening is partially included.        Impression:       1.Significant improvement of previously seen small bowel distention.  Previously seen extraluminal gas/small amount of free intraperitoneal  air has not significantly changed.  2.Stable left renal calculi.  3.Stable 3.4 cm abdominal aortic aneurysm.     Electronically Signed By-Yelena Natarajan On:3/17/2020 11:39 AM  This report was finalized on [id number] by  Yelena Natarajan, .    CT Abdomen Pelvis With Contrast [[id number]] Collected:  03/15/20 1553     Updated:  03/15/20 1605    Narrative:       DATE OF EXAM:  3/15/2020 3:18 PM     PROCEDURE:  CT ABDOMEN PELVIS W CONTRAST-     INDICATIONS:   Lower abdominal pain, vomiting bile and feces,  suspected bowel  obstruction.     COMPARISON:   06/11/2015.     TECHNIQUE:  Routine transaxial slices were obtained through the abdomen and pelvis  after the intravenous administration of 50 mL of Isovue 370.  Reconstructed coronal and sagittal images were also obtained. Automated  exposure control and iterative construction methods were used.     FINDINGS:  There is a nasogastric tube within the stomach which is decompressed.  The duodenum and proximal small bowel are markedly fluid-filled and  dilated. This is consistent with a high-grade bowel obstruction. The  transition point is located within the mid pelvis and best demonstrated  on image 113. There is pneumatosis within the dilated loops of small  bowel in the anterior aspect of the abdomen and there is a tiny amount  of free air in the anterior abdomen best demonstrated on images 70-75  consistent with a perforated viscus. There is marked diastases of the  rectus abdominis muscle similar to before. The appendix is not  visualized and is probably surgically absent. The gallbladder is  surgically absent. The liver, adrenal glands, pancreas, and spleen are  normal. There is a small staghorn calculus in the interpolar region of  the right kidney measuring 13 mm. There are also additional calculi seen  within the inferior pole. The right kidney is unremarkable. There are  atherosclerotic vascular calcifications with within the abdomen and  pelvis. The abdominal aorta is ectatic. There are radioactive seed  implants in the prostate gland. The seminal vesicles and urinary bladder  are normal. There is a mild right basilar subsegmental atelectasis. The  left lung base is clear. There are no suspicious osteolytic or sclerotic  lesions within the bony structures.        Impression:          1. There are findings consistent with a high-grade small bowel  obstruction. The patient also has pneumatosis within the dilated loops  of small bowel as well as free air. The  findings were discussed with Dr. Malachi Du M.D. by telephone.  2. Marked diastases of the rectus abdominous muscle similar to before.  3. Right renal stones. There is a 13 mm staghorn calculus in the  interpolar region of the right kidney.  4. The nasogastric tube tip terminates within the stomach which is  decompressed.  5. Additional findings as noted above.     Electronically Signed By-Yusuf Gray On:3/15/2020 4:03 PM  This report was finalized on [id number] by  Yusuf Gray, .          ECHOCARDIOGRAM:    Results for orders placed during the hospital encounter of 12/03/19   Adult Transthoracic Echo Complete W/ Cont if Necessary Per Protocol    Narrative · Mild mitral valve regurgitation is present  · Mild dilation of the sinuses of Valsalva is present.  · Left ventricular systolic function is hyperdynamic (EF > 70).  · Left atrial cavity size is severely dilated.  · Right ventricular cavity is mildly dilated.  · Left ventricular diastolic dysfunction (grade II) consistent with   pseudonormalization.     Overall normal LV systolic function and size  Normal RV function, mild RV enlargement  Severe left atrial enlargement  Diastolic dysfunction indeterminate by criteria  No masses  No effusion seen  EF hyperdynamic greater than 70%  No significant valvulopathy seen           I reviewed the patient's new clinical results.    EKG:      Assessment:       Small bowel obstruction due to adhesions (CMS/HCC)    1) SBO  - conservative management per surgery;  last resort would be surgery with ventral hernia and abdominal wall instability  - repeat CT abd improved  - remains on IVFs, but advancing diet today 3/18     2) CAD s/p PCI with MICHAEL to LAD 12/5/20  - continue on uninterrupted dual antiplatelet therapy with aspirin and effient  - statin remains on hold given elevated LFTs on admit  - taking meds PO now     3) permanent afib s/p PPM  - not on AC secondary to hx GIB  - Vpaced on BB    4) Acute on Chronic Diastolic  Heart Failure  - .5  - 2D echo 12/4 showed EF > 70% with grade 2 diastolic dysfunction, mild MR.  - On lasix 20 mg IV daily; plan to stop IVFs when taking PO diet     4) HTN  - elevated on labetalol 10 mg IV every 8 hours and PRN hydralazine  - will resume PO home antihypertensive meds and transition off IV meds     5) HLD  - statin on hold given elevated LFTs on admit     6) DM     7) COPD     8) Hx liver cirrhosis, elevated LFTs nearly resolved    Plan:   Will restart home PO CCB and plan to transition off IV labetolol.  Will wait to restart ARB while on IV diuretics.  Hypervolemia improving with -1552 fluid balance.  Cr stable.  Will stop IVFs once taking better PO.        Electronically signed by KOURTNEY Evangelista, 03/18/20, 9:07 AM.

## 2020-03-18 NOTE — PROGRESS NOTES
LOS: 3 days   Patient Care Team:  Yusuf Jones MD as PCP - General  Joshua Yip MD as Consulting Physician (Cardiology)    Subjective:    Better    Objective:     Afebrile      Review of Systems:   Review of Systems   Constitutional: Negative.    Respiratory: Negative.    Cardiovascular: Negative.    Gastrointestinal: Positive for abdominal distention and abdominal pain.           Vital Signs  Temp:  [97.5 °F (36.4 °C)-98.4 °F (36.9 °C)] 97.5 °F (36.4 °C)  Heart Rate:  [70-79] 71  Resp:  [16-20] 20  BP: (155-183)/(74-96) 166/83    Physical Exam:  Physical Exam   Constitutional: He appears well-developed and well-nourished.   HENT:   Head: Normocephalic and atraumatic.   Eyes: Pupils are equal, round, and reactive to light. EOM are normal.   Neck: Normal range of motion.   Cardiovascular: Normal rate.   Pulmonary/Chest: Effort normal.   Abdominal: Soft.   Musculoskeletal: Normal range of motion.   Neurological: He is alert.   Skin: Skin is warm.        Radiology:  Ct Abdomen Pelvis With Contrast    Addendum Date: 3/17/2020    Addendum. This sentence was left out of the impression: Possible 15 mm right renal mass versus lobulated cortical contour. Recommend further evaluation beginning with nonurgent renal ultrasound.  Electronically Signed ByMaria Fernanda Natarajan On:3/17/2020 11:50 AM This report was finalized on [id number] by  Yelena Natarajan, .    Result Date: 3/17/2020  1.Significant improvement of previously seen small bowel distention. Previously seen extraluminal gas/small amount of free intraperitoneal air has not significantly changed. 2.Stable left renal calculi. 3.Stable 3.4 cm abdominal aortic aneurysm.  Electronically Signed By-Yelena Natarajan On:3/17/2020 11:39 AM This report was finalized on [id number] by  Yelena Natarajan, .    Ct Abdomen Pelvis With Contrast    Result Date: 3/15/2020   1. There are findings consistent with a high-grade small bowel obstruction. The patient also has  pneumatosis within the dilated loops of small bowel as well as free air. The findings were discussed with Dr. Malachi Du M.D. by telephone. 2. Marked diastases of the rectus abdominous muscle similar to before. 3. Right renal stones. There is a 13 mm staghorn calculus in the interpolar region of the right kidney. 4. The nasogastric tube tip terminates within the stomach which is decompressed. 5. Additional findings as noted above.  Electronically Signed By-Yusuf Gray On:3/15/2020 4:03 PM This report was finalized on [id number] by  Yusuf Gray, .         Results Review:     I reviewed the patient's new clinical results.  I reviewed the patient's new imaging results and agree with the interpretation.    Medication Review:   Scheduled Meds:  !Vancomycin Level Draw Needed  Does not apply Once   [START ON 3/19/2020] !Vancomycin Level Draw Needed  Does not apply Once   ampicillin-sulbactam 3 g Intravenous Q6H   aspirin 81 mg Oral Daily   famotidine 20 mg Intravenous Q12H   furosemide 20 mg Intravenous Daily   labetalol 10 mg Intravenous Q8H   prasugrel 10 mg Oral Daily   sodium chloride 10 mL Intravenous Q12H   vancomycin 15 mg/kg (Adjusted) Intravenous Q24H     Continuous Infusions:  lactated ringers 100 mL/hr Last Rate: 100 mL/hr (03/17/20 0814)   Pharmacy to dose vancomycin       PRN Meds:.hydrALAZINE  •  HYDROmorphone  •  ipratropium-albuterol  •  metoprolol tartrate  •  ondansetron  •  Pharmacy to dose vancomycin  •  sodium chloride    Labs:    CBC    Results from last 7 days   Lab Units 03/18/20  0515 03/17/20  0352 03/16/20  0349 03/15/20  1336   WBC 10*3/mm3 7.00 6.20 5.80 10.50   HEMOGLOBIN g/dL 13.8 14.3 14.0 16.6   PLATELETS 10*3/mm3 253 251 260 371     BMP Results from last 7 days   Lab Units 03/18/20  0515 03/17/20  2359 03/17/20  0352 03/16/20  0349 03/15/20  1925 03/15/20  1336   SODIUM mmol/L 136 137 137 138 135* 139   POTASSIUM mmol/L 3.9 4.6 4.3 4.7 4.5 5.2   CHLORIDE mmol/L 98 97* 100 101 102 97*    CO2 mmol/L 26.0 27.0 26.0 28.0 26.0 30.0*   BUN mg/dL 16 17 16 22 24* 29*   CREATININE mg/dL 1.00 1.06 1.11 1.31* 1.23 1.60*   GLUCOSE mg/dL 117* 118* 90 131* 114* 152*     Cr Clearance Estimated Creatinine Clearance: 89.1 mL/min (by C-G formula based on SCr of 1 mg/dL).  Coag   Results from last 7 days   Lab Units 03/15/20  1336   INR  1.09   APTT seconds 24.5     HbA1C No results found for: HGBA1C  Blood Glucose No results found for: POCGLU  Infection     CMP Results from last 7 days   Lab Units 03/18/20  0515 03/17/20  2359 03/17/20  0352 03/16/20  0349 03/15/20  1925 03/15/20  1336   SODIUM mmol/L 136 137 137 138 135* 139   POTASSIUM mmol/L 3.9 4.6 4.3 4.7 4.5 5.2   CHLORIDE mmol/L 98 97* 100 101 102 97*   CO2 mmol/L 26.0 27.0 26.0 28.0 26.0 30.0*   BUN mg/dL 16 17 16 22 24* 29*   CREATININE mg/dL 1.00 1.06 1.11 1.31* 1.23 1.60*   GLUCOSE mg/dL 117* 118* 90 131* 114* 152*   ALBUMIN g/dL 3.50  --  3.30* 3.20* 3.50 4.10   BILIRUBIN mg/dL 0.9  --  1.1 2.6* 1.0 1.3*   ALK PHOS U/L 209*  --  235* 216* 112 141*   AST (SGOT) U/L 39  --  95* 195* 12 13   ALT (SGPT) U/L 43*  --  69* 57* 7 8   LIPASE U/L  --   --   --   --   --  10*     UA      Radiology(recent) Ct Abdomen Pelvis With Contrast    Addendum Date: 3/17/2020    Addendum. This sentence was left out of the impression: Possible 15 mm right renal mass versus lobulated cortical contour. Recommend further evaluation beginning with nonurgent renal ultrasound.  Electronically Signed By-Yelena Natarajan On:3/17/2020 11:50 AM This report was finalized on [id number] by  Yelena Natarajan, .    Result Date: 3/17/2020  1.Significant improvement of previously seen small bowel distention. Previously seen extraluminal gas/small amount of free intraperitoneal air has not significantly changed. 2.Stable left renal calculi. 3.Stable 3.4 cm abdominal aortic aneurysm.  Electronically Signed By-Yelena Natarajan On:3/17/2020 11:39 AM This report was finalized on [id number] by   Yelena Natarajan, .     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

## 2020-03-19 VITALS
HEART RATE: 69 BPM | DIASTOLIC BLOOD PRESSURE: 94 MMHG | SYSTOLIC BLOOD PRESSURE: 183 MMHG | OXYGEN SATURATION: 96 % | WEIGHT: 257.5 LBS | HEIGHT: 74 IN | RESPIRATION RATE: 18 BRPM | BODY MASS INDEX: 33.05 KG/M2 | TEMPERATURE: 97.8 F

## 2020-03-19 LAB
ALBUMIN SERPL-MCNC: 3.6 G/DL (ref 3.5–5.2)
ALBUMIN/GLOB SERPL: 1.6 G/DL
ALP SERPL-CCNC: 181 U/L (ref 39–117)
ALT SERPL W P-5'-P-CCNC: 32 U/L (ref 1–41)
ANION GAP SERPL CALCULATED.3IONS-SCNC: 10 MMOL/L (ref 5–15)
AST SERPL-CCNC: 27 U/L (ref 1–40)
BASOPHILS # BLD AUTO: 0.1 10*3/MM3 (ref 0–0.2)
BASOPHILS NFR BLD AUTO: 1 % (ref 0–1.5)
BILIRUB SERPL-MCNC: 0.8 MG/DL (ref 0.2–1.2)
BUN BLD-MCNC: 15 MG/DL (ref 8–23)
BUN/CREAT SERPL: 15.5 (ref 7–25)
CALCIUM SPEC-SCNC: 9.2 MG/DL (ref 8.6–10.5)
CHLORIDE SERPL-SCNC: 97 MMOL/L (ref 98–107)
CO2 SERPL-SCNC: 29 MMOL/L (ref 22–29)
CREAT BLD-MCNC: 0.97 MG/DL (ref 0.76–1.27)
DEPRECATED RDW RBC AUTO: 48.6 FL (ref 37–54)
EOSINOPHIL # BLD AUTO: 0.2 10*3/MM3 (ref 0–0.4)
EOSINOPHIL NFR BLD AUTO: 3.4 % (ref 0.3–6.2)
ERYTHROCYTE [DISTWIDTH] IN BLOOD BY AUTOMATED COUNT: 15.2 % (ref 12.3–15.4)
GFR SERPL CREATININE-BSD FRML MDRD: 76 ML/MIN/1.73
GLOBULIN UR ELPH-MCNC: 2.3 GM/DL
GLUCOSE BLD-MCNC: 113 MG/DL (ref 65–99)
HCT VFR BLD AUTO: 37.1 % (ref 37.5–51)
HGB BLD-MCNC: 13.7 G/DL (ref 13–17.7)
LYMPHOCYTES # BLD AUTO: 0.8 10*3/MM3 (ref 0.7–3.1)
LYMPHOCYTES NFR BLD AUTO: 10.9 % (ref 19.6–45.3)
MCH RBC QN AUTO: 33.8 PG (ref 26.6–33)
MCHC RBC AUTO-ENTMCNC: 36.8 G/DL (ref 31.5–35.7)
MCV RBC AUTO: 91.7 FL (ref 79–97)
MONOCYTES # BLD AUTO: 0.8 10*3/MM3 (ref 0.1–0.9)
MONOCYTES NFR BLD AUTO: 11.1 % (ref 5–12)
NEUTROPHILS # BLD AUTO: 5.2 10*3/MM3 (ref 1.7–7)
NEUTROPHILS NFR BLD AUTO: 73.6 % (ref 42.7–76)
NRBC BLD AUTO-RTO: 0.1 /100 WBC (ref 0–0.2)
PLATELET # BLD AUTO: 251 10*3/MM3 (ref 140–450)
PMV BLD AUTO: 8.1 FL (ref 6–12)
POTASSIUM BLD-SCNC: 4 MMOL/L (ref 3.5–5.2)
PROT SERPL-MCNC: 5.9 G/DL (ref 6–8.5)
RBC # BLD AUTO: 4.05 10*6/MM3 (ref 4.14–5.8)
SODIUM BLD-SCNC: 136 MMOL/L (ref 136–145)
WBC NRBC COR # BLD: 7.1 10*3/MM3 (ref 3.4–10.8)

## 2020-03-19 PROCEDURE — 80053 COMPREHEN METABOLIC PANEL: CPT | Performed by: INTERNAL MEDICINE

## 2020-03-19 PROCEDURE — 25010000002 HYDROMORPHONE PER 4 MG: Performed by: INTERNAL MEDICINE

## 2020-03-19 PROCEDURE — 85025 COMPLETE CBC W/AUTO DIFF WBC: CPT | Performed by: FAMILY MEDICINE

## 2020-03-19 PROCEDURE — 25010000002 ONDANSETRON PER 1 MG: Performed by: INTERNAL MEDICINE

## 2020-03-19 RX ORDER — OXYCODONE HYDROCHLORIDE 5 MG/1
10 TABLET ORAL ONCE
Status: COMPLETED | OUTPATIENT
Start: 2020-03-19 | End: 2020-03-19

## 2020-03-19 RX ORDER — OXYCODONE HYDROCHLORIDE 5 MG/1
10 TABLET ORAL EVERY 4 HOURS PRN
Status: DISCONTINUED | OUTPATIENT
Start: 2020-03-19 | End: 2020-03-19

## 2020-03-19 RX ADMIN — HYDROMORPHONE HYDROCHLORIDE 0.5 MG: 2 INJECTION, SOLUTION INTRAMUSCULAR; INTRAVENOUS; SUBCUTANEOUS at 01:01

## 2020-03-19 RX ADMIN — PRASUGREL 10 MG: 10 TABLET, FILM COATED ORAL at 08:19

## 2020-03-19 RX ADMIN — DILTIAZEM HYDROCHLORIDE 120 MG: 120 CAPSULE, COATED, EXTENDED RELEASE ORAL at 08:19

## 2020-03-19 RX ADMIN — POLYETHYLENE GLYCOL 3350 17 G: 17 POWDER, FOR SOLUTION ORAL at 08:19

## 2020-03-19 RX ADMIN — FUROSEMIDE 40 MG: 40 TABLET ORAL at 08:19

## 2020-03-19 RX ADMIN — HYDROMORPHONE HYDROCHLORIDE 0.5 MG: 2 INJECTION, SOLUTION INTRAMUSCULAR; INTRAVENOUS; SUBCUTANEOUS at 06:03

## 2020-03-19 RX ADMIN — LABETALOL 20 MG/4 ML (5 MG/ML) INTRAVENOUS SYRINGE 10 MG: at 06:03

## 2020-03-19 RX ADMIN — HYDROMORPHONE HYDROCHLORIDE 0.5 MG: 2 INJECTION, SOLUTION INTRAMUSCULAR; INTRAVENOUS; SUBCUTANEOUS at 02:59

## 2020-03-19 RX ADMIN — ONDANSETRON 4 MG: 2 INJECTION INTRAMUSCULAR; INTRAVENOUS at 06:49

## 2020-03-19 RX ADMIN — FAMOTIDINE 20 MG: 10 INJECTION INTRAVENOUS at 08:19

## 2020-03-19 RX ADMIN — Medication 10 ML: at 08:19

## 2020-03-19 RX ADMIN — ONDANSETRON 4 MG: 2 INJECTION INTRAMUSCULAR; INTRAVENOUS at 02:59

## 2020-03-19 RX ADMIN — OXYCODONE HYDROCHLORIDE 10 MG: 5 TABLET ORAL at 10:32

## 2020-03-19 RX ADMIN — ASPIRIN 81 MG 81 MG: 81 TABLET ORAL at 08:19

## 2020-03-19 NOTE — PLAN OF CARE
Pt doing well. Ambulating well. Having Bms. Tolerating low res diet. Resting well. Will continue to monitor.

## 2020-03-19 NOTE — DISCHARGE SUMMARY
Date of Discharge:  3/19/2020    Discharge Diagnosis:     Small bowel obstruction due to adhesions (CMS/HCC)  Kidney stones  Ascites  History of alcoholism  COPD, panlobular with COPD  Heart failure  Liver cirrhosis  AAA  CAD of native coronary arteries with angina pectoris  Hyperlipidemia  Diabetes 2 with angiopathic and neuropathic manifestations  Chronic respiratory failure  Chronic bronchitis  Prostate cancer  Polyneurpropathy secondary to diabetes and alcohol      Presenting Problem/History of Present Illness  Active Hospital Problems    Diagnosis  POA   • **Small bowel obstruction due to adhesions (CMS/HCC) [K56.50]  Yes      Resolved Hospital Problems   No resolved problems to display.          Hospital Course  Patient is a 74 y.o. male presented with progressive abdominal discomfort.  He was found to have an acute small bowel obstruction likely secondary to adhesions and was immediately placed on bowel rest with nasogastric decompression.  Pain was controlled with parenteral measures and he began to improve.  Bowel function spontaneously returned and his diet was advanced and he improved to the point he was deemed stable for discharge home for close outpatient follow-up with us within 1 to 2 weeks time.  There were no other complications throughout the patient's hospital stay.  Discharge prognosis remains quite guarded given his underlying anatomy and given poor substrate overall.    Procedures Performed         Consults:   Consults     Date and Time Order Name Status Description    3/16/2020 0914 Inpatient Cardiology Consult Completed     3/15/2020 1603 Surgery (on-call MD unless specified) Completed     3/15/2020 1603 Hospitalist (on-call MD unless specified) Completed           Pertinent Test Results:Ct Abdomen Pelvis With Contrast    Addendum Date: 3/17/2020    Addendum. This sentence was left out of the impression: Possible 15 mm right renal mass versus lobulated cortical contour. Recommend further  evaluation beginning with nonurgent renal ultrasound.  Electronically Signed By-Yelena Ntaarajan On:3/17/2020 11:50 AM This report was finalized on [id number] by  Yelena Natarajan, .    Result Date: 3/17/2020  1.Significant improvement of previously seen small bowel distention. Previously seen extraluminal gas/small amount of free intraperitoneal air has not significantly changed. 2.Stable left renal calculi. 3.Stable 3.4 cm abdominal aortic aneurysm.  Electronically Signed By-Yelena Natarajan On:3/17/2020 11:39 AM This report was finalized on [id number] by  Yelena Natarajan, .    Ct Abdomen Pelvis With Contrast    Result Date: 3/15/2020   1. There are findings consistent with a high-grade small bowel obstruction. The patient also has pneumatosis within the dilated loops of small bowel as well as free air. The findings were discussed with Dr. Malachi Du M.D. by telephone. 2. Marked diastases of the rectus abdominous muscle similar to before. 3. Right renal stones. There is a 13 mm staghorn calculus in the interpolar region of the right kidney. 4. The nasogastric tube tip terminates within the stomach which is decompressed. 5. Additional findings as noted above.  Electronically Signed By-Yusuf Gray On:3/15/2020 4:03 PM This report was finalized on [id number] by  Yusuf Gray, .      Imaging Results (Last 7 Days)     Procedure Component Value Units Date/Time    CT Abdomen Pelvis With Contrast [[id number]] Collected:  03/17/20 1127     Updated:  03/17/20 1152    Addenda:        Addendum. This sentence was left out of the   impression: Possible 15 mm right renal mass versus lobulated cortical  contour. Recommend further evaluation beginning with nonurgent renal  ultrasound.     Electronically Signed ByMaria Fernanda Natarajan On:3/17/2020 11:50 AM  This report was finalized on [id number] by  Yelena Natarajan, .  Signed:  03/17/20 1150 by Yelena Natarajan MD    Narrative:          DATE OF EXAM:  3/17/2020 10:59 AM      PROCEDURE:  CT ABDOMEN PELVIS W CONTRAST-     INDICATIONS:   reassess bowel obstruction; K56.50-Intestinal adhesions (bands),  unspecified as to partial versus complete obstruction;  R10.84-Generalized abdominal pain; R19.8-Other specified symptoms and  signs involving the digestive system and abdomen     COMPARISON:   CT abdomen pelvis 03/15/2020.     TECHNIQUE:  Routine transaxial slices were obtained through the abdomen and pelvis  after the intravenous administration of 100 mL of Isovue 370.  Reconstructed coronal and sagittal images were also obtained. Automated  exposure control and iterative construction methods were used.     FINDINGS:  The gallbladder is surgically absent. The pancreas is partially fatty  replaced. There is a possible enhancing mass in the lower right kidney  laterally measuring 15 mm on axial image 81. Left renal calculi are  redemonstrated, measuring up to 11 mm. The adrenal glands, spleen, and  liver appear unremarkable. Tip of the enteric tube terminates in the  gastric body. Previously seen small bowel distention has significantly  improved. Bowel loops remain adhered to the anterior abdominal wall,  presumably from adhesions. There is persistent extraluminal gas between  small bowel loops (for example, on axial image 86), as well as in the  nondependent abdomen (axial image 76). Urinary bladder appears  unremarkable. Diastases of the rectus muscles appears stable. No  significant free fluid or adenopathy is seen. 3.4 cm infrarenal  abdominal aortic aneurysm is stable (coronal image 82). Right lower lobe  bronchial wall thickening is partially included.        Impression:       1.Significant improvement of previously seen small bowel distention.  Previously seen extraluminal gas/small amount of free intraperitoneal  air has not significantly changed.  2.Stable left renal calculi.  3.Stable 3.4 cm abdominal aortic aneurysm.     Electronically Signed By-Yelena Natarajan On:3/17/2020 11:39  AM  This report was finalized on [id number] by  Yelena Natarajan, .    CT Abdomen Pelvis With Contrast [[id number]] Collected:  03/15/20 1553     Updated:  03/15/20 1605    Narrative:       DATE OF EXAM:  3/15/2020 3:18 PM     PROCEDURE:  CT ABDOMEN PELVIS W CONTRAST-     INDICATIONS:   Lower abdominal pain, vomiting bile and feces, suspected bowel  obstruction.     COMPARISON:   06/11/2015.     TECHNIQUE:  Routine transaxial slices were obtained through the abdomen and pelvis  after the intravenous administration of 50 mL of Isovue 370.  Reconstructed coronal and sagittal images were also obtained. Automated  exposure control and iterative construction methods were used.     FINDINGS:  There is a nasogastric tube within the stomach which is decompressed.  The duodenum and proximal small bowel are markedly fluid-filled and  dilated. This is consistent with a high-grade bowel obstruction. The  transition point is located within the mid pelvis and best demonstrated  on image 113. There is pneumatosis within the dilated loops of small  bowel in the anterior aspect of the abdomen and there is a tiny amount  of free air in the anterior abdomen best demonstrated on images 70-75  consistent with a perforated viscus. There is marked diastases of the  rectus abdominis muscle similar to before. The appendix is not  visualized and is probably surgically absent. The gallbladder is  surgically absent. The liver, adrenal glands, pancreas, and spleen are  normal. There is a small staghorn calculus in the interpolar region of  the right kidney measuring 13 mm. There are also additional calculi seen  within the inferior pole. The right kidney is unremarkable. There are  atherosclerotic vascular calcifications with within the abdomen and  pelvis. The abdominal aorta is ectatic. There are radioactive seed  implants in the prostate gland. The seminal vesicles and urinary bladder  are normal. There is a mild right basilar subsegmental  atelectasis. The  left lung base is clear. There are no suspicious osteolytic or sclerotic  lesions within the bony structures.        Impression:          1. There are findings consistent with a high-grade small bowel  obstruction. The patient also has pneumatosis within the dilated loops  of small bowel as well as free air. The findings were discussed with Dr. Malachi Du M.D. by telephone.  2. Marked diastases of the rectus abdominous muscle similar to before.  3. Right renal stones. There is a 13 mm staghorn calculus in the  interpolar region of the right kidney.  4. The nasogastric tube tip terminates within the stomach which is  decompressed.  5. Additional findings as noted above.     Electronically Signed By-Yusuf Gray On:3/15/2020 4:03 PM  This report was finalized on [id number] by  Yusuf Gray, .              Condition on Discharge:  Fair    Vital Signs  Temp:  [97.2 °F (36.2 °C)-98.1 °F (36.7 °C)] 97.8 °F (36.6 °C)  Heart Rate:  [69-78] 69  Resp:  [18-20] 18  BP: (172-192)/(84-97) 183/94    Physical Exam:     General Appearance:    Alert, cooperative, in no acute distress   Head:    Normocephalic, without obvious abnormality, atraumatic   Eyes:           Conjunctivae and sclerae normal, no   icterus, no pallor, corneas clear, PERRLA   Throat:   No oral lesions, no thrush, oral mucosa moist   Neck:   No adenopathy, supple, trachea midline, no thyromegaly, no   carotid bruit, no JVD   Lungs:     Clear to auscultation,respirations regular, even and                  unlabored    Heart:    Regular rhythm and normal rate, normal S1 and S2, no            murmur, no gallop, no rub, no click   Chest Wall:    No abnormalities observed   Abdomen:     Normal bowel sounds, no masses, no organomegaly, soft        non-tender, non-distended, no guarding, no rebound                tenderness   Rectal:     Deferred   Extremities:   Moves all extremities well, no edema, no cyanosis, no             redness   Pulses:    Pulses palpable and equal bilaterally   Skin:   No bleeding, bruising or rash   Lymph nodes:   No palpable adenopathy   Neurologic:   Cranial nerves 2 - 12 grossly intact, sensation intact, DTR       present and equal bilaterally         Discharge Disposition  Home or Self Care    Discharge Medications     Discharge Medications      Continue These Medications      Instructions Start Date   aspirin 81 MG EC tablet   81 mg, Oral, Daily      atorvastatin 40 MG tablet  Commonly known as:  LIPITOR   40 mg, Oral, Nightly      digoxin 250 MCG tablet  Commonly known as:  LANOXIN   250 mcg, Oral, Daily Digoxin      dilTIAZem  MG 24 hr capsule  Commonly known as:  CARDIZEM CD   120 mg, Oral, Daily      furosemide 40 MG tablet  Commonly known as:  LASIX   40 mg, Oral      losartan 100 MG tablet  Commonly known as:  COZAAR   100 mg, Oral, Daily      Lyrica 150 MG capsule  Generic drug:  pregabalin   150 mg, Oral, 2 Times Daily      metoprolol tartrate 25 MG tablet  Commonly known as:  LOPRESSOR   25 mg, Oral, Every 12 Hours Scheduled      mirtazapine 30 MG tablet  Commonly known as:  REMERON   30 mg, Oral, Nightly      oxyCODONE-acetaminophen  MG per tablet  Commonly known as:  PERCOCET   1 tablet, Oral, Every 6 Hours PRN      potassium chloride 10 MEQ CR tablet  Commonly known as:  K-DUR   10 mEq, Oral, 2 Times Daily      potassium chloride 10 MEQ CR capsule  Commonly known as:  MICRO-K   10 mEq, Oral, Daily      prasugrel 10 MG tablet  Commonly known as:  EFFIENT   Take 60mg the first day and then 10mg daily thereafter.      traZODone 100 MG tablet  Commonly known as:  DESYREL   100 mg, Oral, Nightly      Ventolin  (90 Base) MCG/ACT inhaler  Generic drug:  albuterol sulfate HFA   2 puffs, Inhalation, Every 6 Hours PRN      vitamin D 1.25 MG (31211 UT) capsule capsule  Commonly known as:  ERGOCALCIFEROL   50,000 Units, Oral         Stop These Medications    diphenoxylate-atropine 2.5-0.025 MG per  tablet  Commonly known as:  LOMOTIL     doxycycline 100 MG tablet  Commonly known as:  VIBRAMYICN     predniSONE 10 MG (48) tablet pack  Commonly known as:  DELTASONE            Discharge Diet:   ADA 1800-calorie cardiac  Activity at Discharge:   As tolerated     follow-up Appointments  Follow-up with us within 1 to 2 weeks  Future Appointments   Date Time Provider Department Center   4/30/2020 11:00 AM Herberth Oconnor MD [facility]  [facility] None   8/24/2020  1:45 PM Deam, Moise Kathleen MD [facility] None         Test Results Pending at Discharge       Yusuf Jones MD  03/19/20  07:34

## 2020-03-20 ENCOUNTER — READMISSION MANAGEMENT (OUTPATIENT)
Dept: CALL CENTER | Facility: HOSPITAL | Age: 75
End: 2020-03-20

## 2020-03-20 NOTE — OUTREACH NOTE
Prep Survey      Responses   [facility]?  [facility]   Is LACE score < 7 ?  No   Eligibility  Readm Mgmt   Discharge diagnosis  Small bowel obstruction due to adhesions    Does the patient have one of the following disease processes/diagnoses(primary or secondary)?  Other   Does the patient have Home health ordered?  No   Is there a DME ordered?  No   Prep survey completed?  Yes          Lucie Hou RN

## 2020-03-23 ENCOUNTER — READMISSION MANAGEMENT (OUTPATIENT)
Dept: CALL CENTER | Facility: HOSPITAL | Age: 75
End: 2020-03-23

## 2020-03-23 NOTE — OUTREACH NOTE
Medical Week 1 Survey      Responses   [facility] [place] by [facility] patient discharged from?  [facility]   Does the patient have one of the following disease processes/diagnoses(primary or secondary)?  Other   Is there a successful TCM telephone encounter documented?  No   Week 1 attempt successful?  No   Unsuccessful attempts  Attempt 3          Lali Sahu LPN  
As above  Much less pain, no N, V hungry    Afebrile    Abd  mild but decreased RLQ tenderness, no mass palpable         WBC dec to 6000    Imp  Picture c/w tumor in the cecum vs appendiceal abscess.,  Not acute appendicitis  Plan  Continue antibiotics, begin clear liquids.  Will repeat CT scan in 72 hrs and if no acute changes will consider colonoscopy as outpatient

## 2020-03-30 ENCOUNTER — READMISSION MANAGEMENT (OUTPATIENT)
Dept: CALL CENTER | Facility: HOSPITAL | Age: 75
End: 2020-03-30

## 2020-03-30 NOTE — OUTREACH NOTE
Medical Week 2 Survey      Responses   [facility] patient discharged from?  [facility]   Does the patient have one of the following disease processes/diagnoses(primary or secondary)?  Other   Week 2 attempt successful?  No   Unsuccessful attempts  Attempt 1          Lali Sahu LPN

## 2020-04-30 ENCOUNTER — OFFICE VISIT (OUTPATIENT)
Dept: PAIN MEDICINE | Facility: CLINIC | Age: 75
End: 2020-04-30

## 2020-04-30 VITALS — BODY MASS INDEX: 32.47 KG/M2 | HEIGHT: 73 IN | WEIGHT: 245 LBS

## 2020-04-30 DIAGNOSIS — M54.50 CHRONIC MIDLINE LOW BACK PAIN WITHOUT SCIATICA: Primary | ICD-10-CM

## 2020-04-30 DIAGNOSIS — M54.16 LUMBAR RADICULOPATHY: ICD-10-CM

## 2020-04-30 DIAGNOSIS — G89.29 CHRONIC MIDLINE LOW BACK PAIN WITHOUT SCIATICA: Primary | ICD-10-CM

## 2020-04-30 DIAGNOSIS — M47.817 SPONDYLOSIS OF LUMBOSACRAL REGION WITHOUT MYELOPATHY OR RADICULOPATHY: ICD-10-CM

## 2020-04-30 DIAGNOSIS — M51.36 DDD (DEGENERATIVE DISC DISEASE), LUMBAR: ICD-10-CM

## 2020-04-30 DIAGNOSIS — M54.59 LUMBAR FACET JOINT PAIN: ICD-10-CM

## 2020-04-30 PROCEDURE — 99441 PR PHYS/QHP TELEPHONE EVALUATION 5-10 MIN: CPT | Performed by: PHYSICAL MEDICINE & REHABILITATION

## 2020-04-30 RX ORDER — OXYCODONE AND ACETAMINOPHEN 10; 325 MG/1; MG/1
1 TABLET ORAL EVERY 6 HOURS PRN
Qty: 120 TABLET | Refills: 0 | Status: SHIPPED | OUTPATIENT
Start: 2020-04-30 | End: 2020-05-28 | Stop reason: SDUPTHER
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
## 2020-05-04 ENCOUNTER — TELEMEDICINE (OUTPATIENT)
Dept: CARDIOLOGY | Facility: CLINIC | Age: 75
End: 2020-05-04

## 2020-05-04 DIAGNOSIS — E78.41 ELEVATED LIPOPROTEIN(A): ICD-10-CM

## 2020-05-04 DIAGNOSIS — I48.11 LONGSTANDING PERSISTENT ATRIAL FIBRILLATION (HCC): ICD-10-CM

## 2020-05-04 DIAGNOSIS — I10 ESSENTIAL HYPERTENSION: ICD-10-CM

## 2020-05-04 DIAGNOSIS — I25.118 CORONARY ARTERY DISEASE OF NATIVE ARTERY OF NATIVE HEART WITH STABLE ANGINA PECTORIS (HCC): Primary | ICD-10-CM

## 2020-05-04 PROCEDURE — 99214 OFFICE O/P EST MOD 30 MIN: CPT | Performed by: INTERNAL MEDICINE

## 2020-05-04 NOTE — PROGRESS NOTES
Cardiology clinic note  Joshua Yip MD, PhD  [facility] cardiology  Subjective:     Encounter Date:05/04/2020      Patient ID: Ro Nathan is a 74 y.o. male.    Chief Complaint:  Chief Complaint   Patient presents with   • Follow-up       HPI:  History of Present Illness  I the pleasure to meet with a 74-year-old male today who is well-known to me from prior clinic visits and encounters.  He has a history of coronary artery disease with stents in the proximal LAD as well as mid to distal circumflex.  Most recently he was treated for in-stent restenosis of the proximal LAD in December 2019 and remains asymptomatic without anginal chest pain presently no heart failure signs or symptoms with preserved LV systolic function.  He has cardiac history of atrial fibrillation also and has seen electrophysiology recently and is presently on digoxin in addition to metoprolol and diltiazem for rate and rhythm control strategy.  He is on aspirin and Effient for dual antiplatelet therapy after drug-eluting stent placement to the LAD with no bleeding problems on encounter today.  He is on moderate to high intensity statin therapy atorvastatin 40 mg p.o. daily and tolerating this well with no muscle aches or joint pains.  He is on an ARB with max dose losartan and his blood pressures been controlled at home he says.  He has stable NYHA class II shortness of air, no edema no heart failure signs or symptoms no bleeding no syncope or palpitations or passing out.  He had some type of diarrhea and GI syndrome on his last encounter with primary care but this resolved with no issues.  We are continuing to manage his residual coronary artery disease which is significant with high OM1 proximal stenosis at the ostium which is essentially a ramus intermedius which bifurcates distally of at least 80%, continuation circumflex disease with left dominant circulation with in-stent restenosis of 60 to 70% in the distal  circumflex as well as small vessel disease and OM 2 and OM 3 greater than 60% but too small for coronary intervention secondary to caliber of the vessel.            Review of systems x14 point review of systems are negative except was mentioned above    Historical data copied for from previous encounters and EMR is unchanged    Most recent left heart catheterization results from December 2019    Indication for procedure:     1.  Unstable angina  2.  Known coronary artery disease status post stenting to the LAD circumflex and right coronary artery in the past   3. multiple coronary artery risk factors     Procedures performed:     1.  Native coronary arteriography  2.  Left Heart catheterization  3.  Left ventriculography  4.  Intravascular ultrasound guided PCI to the LAD  5.  Percutaneous coronary intervention involving drug-eluting stent placement to the ostial proximal LAD (3.5 x 33 mm Xience drug-eluting stent)     Description of procedure:     Patient had the risks and benefits of the procedure explained to them in detail after which informed consent was obtained.  Patient was then brought to the cardiac catheterization lab and placed on the table in supine position.  Next the right groin was prepped and draped in sterile fashion.  Next using modified Seldinger technique and a 21-gauge micro puncture needle, the femoral artery was cannulated without difficulty and a 6 [place] was inserted and flushed with heparinized saline.  Next using diagnostic 6 [place] JR4 and JL4 catheters, each advanced over an 0.035 guidewire to the level the aortic root and then used to selectively engage the respective coronary ostia, multiple cineangiographic images were obtained all the appropriate projections using hand-injection of nonionic contrast sterile.  Before removal of the JR4 catheter the JR4 catheter used across the aortic valve in the left ventricle for left heart catheterization left ventriculography.  The catheter was  then pulled back and removed.     The culprit was identified is an 80% focal in-stent restenotic lesion in the proximal LAD despite an additional severe lesion in the second obtuse marginal branch of the circumflex artery.  Therefore we decided to focus on the LAD lesion.  Using a 6 [place] EBU 4 guide catheter and a whisper export wire we wired the LAD with little difficulty.  We then performed intravascular ultrasound guided evaluation of the ostial left main and LAD lesion and also to determine reference vessel size diameter.  Following intravascular ultrasound we proceeded with predilatation of the LAD using a 3.5 x 10 mm [place] cutting balloon in the mid proximal and proximal in-stent restenotic lesion with inflation to a maximum of 18 xin within the stented segment.  The lesion was reduced to 30%.  Stenting was then performed of the angioplastied segment using a 3.5 x 33 mm Xience drug-eluting stent deployed at 14 xin.  Postdilatation was performed along the vessel with a 3.5 mm NC trek balloon given the reference vessel diameter of 3.5 mm to 2 greater than 3.5 mm with 18 xin of pressure increasing to 20 xin of pressure in the ostial proximal portion of the stented segment.  Post stenting intravascular ultrasound revealed no edge dissection with excellent stent apposition and 0% residual stenosis.  We achieved THOR-3 flow prior and THOR-3 flow following PCI.  The guide catheter and wire were then removed     Patient tolerated the procedure well there were no complications.     During the case, conscious sedation monitoring was more than 30 min.     At the end of the case a 6 [place] Gaby device was deployed in the right groin for right groin hemostasis     Findings     Left heart catheterization:     1.  The opening aortic pressure was 181/81 mmHg with a mean pressure of 119 mmHg  2.  The left ventricular end-diastolic pressure at end expiration was 4 to 8 mmHg  3.  There is no gradient across aortic valve  on pullback     Left ventriculography     The overall estimated left ventricular ejection fraction was 65%.     Native coronary arteriography: Left dominant circulation     1.  Left main coronary arteries a large-caliber vessel gives rise left anterior descending left circumflex flex arteries.  There is an ostial eccentric 40% lesion that appears angiographically to approach 50%. No significant coronary atherosclerotic disease present.  2.  Left anterior descending artery is a large-caliber vessel that gives rise to large caliber diagonal branches and courses along the anterior interventricular groove and wraps around the apex.  There is a proximal eccentric 50% lesion within an in-stent restenotic segment followed by an greater than 80% focal eccentric in-stent restenotic lesion followed by a 60% lesion after the stented segment and otherwise no coronary atherosclerotic disease of any significance present.  3.  The left circumflex arteries a large-caliber vessel gives rise to large caliber bifurcating obtuse marginal branch.  There is an ostial proximal concentric 50% lesion that is calcified that immediately gives rise to a very high first obtuse marginal branch that is best classified as a ramus intermedius branch supplying large territory in the anterolateral wall that has an ostial proximal 70% lesion.  The calcified continuing circumflex and the posterior AV groove gives a second obtuse marginal branch that has mild diffuse disease, a third obtuse marginal branch and then a bifurcating system that has a bifurcation stenting within it before giving rise to the left PDA.  There is diffuse 50% in-stent restenosis within this bifurcation stented segment into the obtuse marginal branch which itself has diffuse 50 to 60% disease within the stented segment.  There is a concentric 80% lesion in the circumflex PDA.  No significant coronary atherosclerotic disease present  4.  The right coronary arteries a large-caliber  nondominant vessel has a stented mid segment after which there several acute marginal branches there is 50% in-stent restenosis approaching 60% at some portions within this nondominant right proximal to mid proximal segment.     Conclusions:     1.  Normal left heart filling pressures with preserved LV systolic function  2.  Normal epicardial anatomy with severe two-vessel coronary artery disease involving what is likely the culprit for the patient's chest pain syndrome, namely the very severe proximal LAD lesions.  We will treat the remaining circumflex lesions with medical therapy and see if the patient's symptoms resolved; this includes the severe lesion within the proximal portion of the high obtuse marginal branch within the circumflex system as well.     Recommendations:     Optimize medical therapy for secondary prevention of ischemic heart disease.               The following portions of the patient's history were reviewed and updated as appropriate: allergies, current medications, past family history, past medical history, past social history, past surgical history and problem list.    Problem List:  Patient Active Problem List   Diagnosis   • Skin ulcer (CMS/HCC)   • Chronic low back pain   • DDD (degenerative disc disease), lumbar   • Spondylosis of lumbosacral region   • Atrial fibrillation (CMS/HCC)   • Chronic obstructive pulmonary disease (CMS/HCC)   • Coronary artery disease   • Gastroesophageal reflux disease   • Hyperlipidemia   • Hypertension   • Lumbar radiculopathy   • Other hammer toe(s) (acquired), right foot   • Presence of cardiac pacemaker   • Status post coronary artery stent placement   • Lumbar facet joint pain   • COPD exacerbation (CMS/HCC)   • COPD (chronic obstructive pulmonary disease) (CMS/HCC)   • Unstable angina (CMS/HCC)   • Stented coronary artery   • Small bowel obstruction due to adhesions (CMS/HCC)       Past Medical History:  Past Medical History:   Diagnosis Date   •  Bruises easily    • CHF (congestive heart failure) (CMS/[facility])    • Congenital heart defect    • Difficulty attaining erection    • Hypertension    • Low back pain    • Poor circulation    • Prostate cancer (CMS/[facility])    • Shortness of breath    • Stented coronary artery 12/05/2019    MICHAEL to LAD       Past Surgical History:  Past Surgical History:   Procedure Laterality Date   • APPENDECTOMY     • CARDIAC CATHETERIZATION N/A 12/5/2019    Procedure: Left Heart Cath;  Surgeon: Mirza Munson MD;  Location: [facility] CATH INVASIVE LOCATION;  Service: Cardiology   • CARDIAC CATHETERIZATION N/A 12/5/2019    Procedure: Stent MICHAEL coronary;  Surgeon: Mirza Munson MD;  Location: [facility] CATH INVASIVE LOCATION;  Service: Cardiology   • CHOLECYSTECTOMY     • ELBOW PROCEDURE      left   • HERNIA REPAIR     • INTERVENTIONAL RADIOLOGY PROCEDURE N/A 12/5/2019    Procedure: Intravascular Ultrasound;  Surgeon: Mirza Munson MD;  Location: [facility] CATH INVASIVE LOCATION;  Service: Cardiology   • OR RT/LT HEART CATHETERS N/A 12/5/2019    Procedure: Percutaneous Coronary Intervention;  Surgeon: Mirza Munson MD;  Location: [facility] CATH INVASIVE LOCATION;  Service: Cardiology       Social History:  Social History     Socioeconomic History   • Marital status:      Spouse name: Not on file   • Number of children: Not on file   • Years of education: Not on file   • Highest education level: Not on file   Tobacco Use   • Smoking status: Former Smoker   • Smokeless tobacco: Never Used   Substance and Sexual Activity   • Alcohol use: Not Currently     Frequency: Never   • Drug use: No   • Sexual activity: Defer       Allergies:  Allergies   Allergen Reactions   • Haloperidol Hives   • Morphine Itching   • Pantoprazole Other (See Comments)     Feels sick after receiving       Immunizations:    There is no immunization history on file for this patient.    ROS:  ROS       Objective:         There  were no vitals taken for this visit.    Physical Exam  No acute distress alert and oriented x3  No dysarthria or aphasia  No stridor or wheezing, speaks in complete sentences  No other physical exam findings able to be obtained secondary to video visit converted to telephone visit from technical difficulties today  In-Office Procedure(s):  Procedures    ASCVD RIsk Score::  The 10-year ASCVD risk score (Yajaira KLEIN Jr., et al., 2013) is: 40%    Values used to calculate the score:      Age: 74 years      Sex: Male      Is Non- : No      Diabetic: No      Tobacco smoker: No      Systolic Blood Pressure: 183 mmHg      Is BP treated: Yes      HDL Cholesterol: 48 mg/dL      Total Cholesterol: 160 mg/dL    Recent Radiology:  Imaging Results (Most Recent)     None          Lab Review:   No results displayed because visit has over 200 results.      Hospital Outpatient Visit on 01/27/2020   Component Date Value   • Protime 01/27/2020 10.7    • INR 01/27/2020 1.02    • WBC 01/27/2020 8.70    • RBC 01/27/2020 4.52    • Hemoglobin 01/27/2020 14.9    • Hematocrit 01/27/2020 41.7    • MCV 01/27/2020 92.2    • MCH 01/27/2020 33.0    • MCHC 01/27/2020 35.8*   • RDW 01/27/2020 15.3    • RDW-SD 01/27/2020 49.9    • MPV 01/27/2020 7.9    • Platelets 01/27/2020 298    • Neutrophil % 01/27/2020 71.6    • Lymphocyte % 01/27/2020 14.8*   • Monocyte % 01/27/2020 8.4    • Eosinophil % 01/27/2020 5.0    • Basophil % 01/27/2020 0.2    • Neutrophils, Absolute 01/27/2020 6.20    • Lymphocytes, Absolute 01/27/2020 1.30    • Monocytes, Absolute 01/27/2020 0.70    • Eosinophils, Absolute 01/27/2020 0.40    • Basophils, Absolute 01/27/2020 0.00    • nRBC 01/27/2020 0.1    Admission on 12/08/2019, Discharged on 12/08/2019   Component Date Value   • D-Dimer, Quantitative 12/08/2019 0.35    • Troponin T 12/08/2019 <0.010    • WBC 12/08/2019 13.80*   • RBC 12/08/2019 4.76    • Hemoglobin 12/08/2019 15.3    • Hematocrit 12/08/2019  43.9    • MCV 12/08/2019 92.2    • MCH 12/08/2019 32.1    • MCHC 12/08/2019 34.8    • RDW 12/08/2019 15.2    • RDW-SD 12/08/2019 49.4    • MPV 12/08/2019 7.9    • Platelets 12/08/2019 292    • Scan Slide 12/08/2019     • Neutrophil % 12/08/2019 82.0*   • Lymphocyte % 12/08/2019 10.0*   • Monocyte % 12/08/2019 3.0*   • Bands %  12/08/2019 2.0    • Atypical Lymphocyte % 12/08/2019 3.0    • Neutrophils Absolute 12/08/2019 11.59*   • Lymphocytes Absolute 12/08/2019 1.38    • Monocytes Absolute 12/08/2019 0.41    • Dacrocytes 12/08/2019 Slight/1+    • Elliptocytes 12/08/2019 Mod/2+    • Polychromasia 12/08/2019 Slight/1+    • WBC Morphology 12/08/2019 Normal    • Large Platelets 12/08/2019 Slight/1+    No results displayed because visit has over 200 results.                   Assessment and plan:         CAD with recent PCI to LAD with Xience drug-eluting stent 3.5 x 33  Dual any platelet therapy, refill pressor dee dee today as he has been off this medicine for some unknown reason  Uninterrupted DAPT for 1 year  High intensity statin therapy  Beta-blocker on board  Defer intervention for residual disease advanced medical therapy, staged IFR of any residual lesions for recurrent symptoms or non-resolving dyspnea on exertion, more specifically proximal ostial circumflex as well as ostial OM1/ramus intermedius, distal in-stent restenosis in the circumflex  Dyspnea on exertion multifactorial with underlying severe pulmonary disease and deconditioning, presently stable     Persistent atrial fibrillation  Off anticoagulation secondary to previously severe GI bleeding, on dual antiplatelet therapy  Continue dual antiplatelet therapy which will provide some stroke risk reduction  Presently rate controlled on beta-blocker     Hypertension controlled continue present regimen  Hyperlipidemia high intensity statin  COPD, finished prednisone, doxycycline continued to completion     Return to clinic in 3 months for symptom assessment  and will defer any invasive staged intervention until then  Encourage continued ambulation as well as diet and exercise and weight loss and caloric restriction per guidelines    Refill all meds today     It is a pleasure to be involved in his cardiovascular care.  Please call with any questions or concerns  Joshua Yip MD, PhD      Unable to complete visit using a video connection to the patient. A phone visit was used to complete this visits. Total time of discussion was 25 minutes with additional 10 minutes of charting.                 Joshua Yip MD  05/04/20  .

## 2020-05-28 DIAGNOSIS — G89.29 CHRONIC MIDLINE LOW BACK PAIN WITHOUT SCIATICA: ICD-10-CM

## 2020-05-28 DIAGNOSIS — M54.50 CHRONIC MIDLINE LOW BACK PAIN WITHOUT SCIATICA: ICD-10-CM

## 2020-05-28 RX ORDER — OXYCODONE AND ACETAMINOPHEN 10; 325 MG/1; MG/1
1 TABLET ORAL EVERY 6 HOURS PRN
Qty: 120 TABLET | Refills: 0 | Status: SHIPPED | OUTPATIENT
Start: 2020-05-28 | End: 2020-06-27

## 2020-06-08 ENCOUNTER — APPOINTMENT (OUTPATIENT)
Dept: CT IMAGING | Facility: HOSPITAL | Age: 75
End: 2020-06-08

## 2020-06-08 ENCOUNTER — HOSPITAL ENCOUNTER (INPATIENT)
Facility: HOSPITAL | Age: 75
LOS: 3 days | Discharge: HOME | End: 2020-06-12
Attending: FAMILY MEDICINE | Admitting: FAMILY MEDICINE

## 2020-06-08 DIAGNOSIS — K56.609 SMALL BOWEL OBSTRUCTION (HCC): ICD-10-CM

## 2020-06-08 DIAGNOSIS — R11.2 NAUSEA AND VOMITING, INTRACTABILITY OF VOMITING NOT SPECIFIED, UNSPECIFIED VOMITING TYPE: ICD-10-CM

## 2020-06-08 DIAGNOSIS — R10.84 GENERALIZED ABDOMINAL PAIN: Primary | ICD-10-CM

## 2020-06-08 LAB
ALBUMIN SERPL-MCNC: 4.2 G/DL (ref 3.5–5.2)
ALBUMIN/GLOB SERPL: 1.4 G/DL
ALP SERPL-CCNC: 144 U/L (ref 39–117)
ALT SERPL W P-5'-P-CCNC: 9 U/L (ref 1–41)
ANION GAP SERPL CALCULATED.3IONS-SCNC: 12 MMOL/L (ref 5–15)
APTT PPP: 24.8 SECONDS (ref 24–31)
AST SERPL-CCNC: 15 U/L (ref 1–40)
BASOPHILS # BLD AUTO: 0.1 10*3/MM3 (ref 0–0.2)
BASOPHILS NFR BLD AUTO: 1.2 % (ref 0–1.5)
BILIRUB SERPL-MCNC: 0.9 MG/DL (ref 0.2–1.2)
BUN BLD-MCNC: 23 MG/DL (ref 8–23)
BUN BLD-MCNC: ABNORMAL MG/DL
BUN/CREAT SERPL: ABNORMAL
CALCIUM SPEC-SCNC: 9.7 MG/DL (ref 8.6–10.5)
CHLORIDE SERPL-SCNC: 102 MMOL/L (ref 98–107)
CO2 SERPL-SCNC: 23 MMOL/L (ref 22–29)
CREAT BLD-MCNC: 1.45 MG/DL (ref 0.76–1.27)
CREAT BLDA-MCNC: 1.5 MG/DL (ref 0.6–1.3)
D-LACTATE SERPL-SCNC: 0.8 MMOL/L (ref 0.5–2)
DEPRECATED RDW RBC AUTO: 49.9 FL (ref 37–54)
EOSINOPHIL # BLD AUTO: 0.3 10*3/MM3 (ref 0–0.4)
EOSINOPHIL NFR BLD AUTO: 2.8 % (ref 0.3–6.2)
ERYTHROCYTE [DISTWIDTH] IN BLOOD BY AUTOMATED COUNT: 15.7 % (ref 12.3–15.4)
GFR SERPL CREATININE-BSD FRML MDRD: 48 ML/MIN/1.73
GLOBULIN UR ELPH-MCNC: 3.1 GM/DL
GLUCOSE BLD-MCNC: 166 MG/DL (ref 65–99)
HCT VFR BLD AUTO: 47 % (ref 37.5–51)
HGB BLD-MCNC: 16.2 G/DL (ref 13–17.7)
INR PPP: 1.06 (ref 0.9–1.1)
LIPASE SERPL-CCNC: 14 U/L (ref 13–60)
LYMPHOCYTES # BLD AUTO: 0.6 10*3/MM3 (ref 0.7–3.1)
LYMPHOCYTES NFR BLD AUTO: 5.6 % (ref 19.6–45.3)
MCH RBC QN AUTO: 31.2 PG (ref 26.6–33)
MCHC RBC AUTO-ENTMCNC: 34.4 G/DL (ref 31.5–35.7)
MCV RBC AUTO: 90.8 FL (ref 79–97)
MONOCYTES # BLD AUTO: 0.6 10*3/MM3 (ref 0.1–0.9)
MONOCYTES NFR BLD AUTO: 4.8 % (ref 5–12)
NEUTROPHILS # BLD AUTO: 9.8 10*3/MM3 (ref 1.7–7)
NEUTROPHILS NFR BLD AUTO: 85.6 % (ref 42.7–76)
NRBC BLD AUTO-RTO: 0.1 /100 WBC (ref 0–0.2)
PLATELET # BLD AUTO: 350 10*3/MM3 (ref 140–450)
PMV BLD AUTO: 8.1 FL (ref 6–12)
POTASSIUM BLD-SCNC: 4.5 MMOL/L (ref 3.5–5.2)
PROT SERPL-MCNC: 7.3 G/DL (ref 6–8.5)
PROTHROMBIN TIME: 11 SECONDS (ref 9.6–11.7)
RBC # BLD AUTO: 5.18 10*6/MM3 (ref 4.14–5.8)
SODIUM BLD-SCNC: 137 MMOL/L (ref 136–145)
WBC NRBC COR # BLD: 11.5 10*3/MM3 (ref 3.4–10.8)

## 2020-06-08 PROCEDURE — 87040 BLOOD CULTURE FOR BACTERIA: CPT | Performed by: NURSE PRACTITIONER

## 2020-06-08 PROCEDURE — 99285 EMERGENCY DEPT VISIT HI MDM: CPT

## 2020-06-08 PROCEDURE — 99284 EMERGENCY DEPT VISIT MOD MDM: CPT

## 2020-06-08 PROCEDURE — 25010000002 PIPERACILLIN SOD-TAZOBACTAM PER 1 G: Performed by: NURSE PRACTITIONER

## 2020-06-08 PROCEDURE — 83690 ASSAY OF LIPASE: CPT | Performed by: NURSE PRACTITIONER

## 2020-06-08 PROCEDURE — 82565 ASSAY OF CREATININE: CPT

## 2020-06-08 PROCEDURE — 85025 COMPLETE CBC W/AUTO DIFF WBC: CPT | Performed by: NURSE PRACTITIONER

## 2020-06-08 PROCEDURE — 85730 THROMBOPLASTIN TIME PARTIAL: CPT | Performed by: NURSE PRACTITIONER

## 2020-06-08 PROCEDURE — G0378 HOSPITAL OBSERVATION PER HR: HCPCS

## 2020-06-08 PROCEDURE — 83605 ASSAY OF LACTIC ACID: CPT

## 2020-06-08 PROCEDURE — 25010000002 HYDROMORPHONE PER 4 MG

## 2020-06-08 PROCEDURE — 0 IOPAMIDOL PER 1 ML: Performed by: NURSE PRACTITIONER

## 2020-06-08 PROCEDURE — 74177 CT ABD & PELVIS W/CONTRAST: CPT

## 2020-06-08 PROCEDURE — 85610 PROTHROMBIN TIME: CPT | Performed by: NURSE PRACTITIONER

## 2020-06-08 PROCEDURE — 25010000002 HYDROMORPHONE PER 4 MG: Performed by: NURSE PRACTITIONER

## 2020-06-08 PROCEDURE — 80053 COMPREHEN METABOLIC PANEL: CPT | Performed by: NURSE PRACTITIONER

## 2020-06-08 PROCEDURE — 25010000002 ONDANSETRON PER 1 MG: Performed by: NURSE PRACTITIONER

## 2020-06-08 PROCEDURE — 25010000002 LORAZEPAM PER 2 MG

## 2020-06-08 RX ORDER — SODIUM CHLORIDE 0.9 % (FLUSH) 0.9 %
10 SYRINGE (ML) INJECTION AS NEEDED
Status: DISCONTINUED | OUTPATIENT
Start: 2020-06-08 | End: 2020-06-12 | Stop reason: HOSPADM

## 2020-06-08 RX ORDER — HYDROMORPHONE HCL 110MG/55ML
0.5 PATIENT CONTROLLED ANALGESIA SYRINGE INTRAVENOUS ONCE
Status: COMPLETED | OUTPATIENT
Start: 2020-06-08 | End: 2020-06-08

## 2020-06-08 RX ORDER — ONDANSETRON 2 MG/ML
4 INJECTION INTRAMUSCULAR; INTRAVENOUS ONCE
Status: COMPLETED | OUTPATIENT
Start: 2020-06-08 | End: 2020-06-08

## 2020-06-08 RX ORDER — LORAZEPAM 2 MG/ML
INJECTION INTRAMUSCULAR
Status: COMPLETED
Start: 2020-06-08 | End: 2020-06-08

## 2020-06-08 RX ORDER — PRASUGREL 10 MG/1
10 TABLET, FILM COATED ORAL DAILY
COMMUNITY
End: 2021-01-09

## 2020-06-08 RX ORDER — HYDROMORPHONE HCL 110MG/55ML
PATIENT CONTROLLED ANALGESIA SYRINGE INTRAVENOUS
Status: COMPLETED
Start: 2020-06-08 | End: 2020-06-08

## 2020-06-08 RX ORDER — LORAZEPAM 2 MG/ML
1 INJECTION INTRAMUSCULAR ONCE
Status: COMPLETED | OUTPATIENT
Start: 2020-06-08 | End: 2020-06-08

## 2020-06-08 RX ADMIN — PIPERACILLIN SODIUM,TAZOBACTAM SODIUM 3.38 G: 3; .375 INJECTION, POWDER, FOR SOLUTION INTRAVENOUS at 23:41

## 2020-06-08 RX ADMIN — LORAZEPAM 1 MG: 2 INJECTION INTRAMUSCULAR at 23:22

## 2020-06-08 RX ADMIN — LORAZEPAM 1 MG: 2 INJECTION INTRAMUSCULAR; INTRAVENOUS at 23:22

## 2020-06-08 RX ADMIN — HYDROMORPHONE HYDROCHLORIDE 0.5 MG: 2 INJECTION, SOLUTION INTRAMUSCULAR; INTRAVENOUS; SUBCUTANEOUS at 21:51

## 2020-06-08 RX ADMIN — IOPAMIDOL 100 ML: 755 INJECTION, SOLUTION INTRAVENOUS at 22:10

## 2020-06-08 RX ADMIN — SODIUM CHLORIDE 500 ML: 900 INJECTION, SOLUTION INTRAVENOUS at 21:51

## 2020-06-08 RX ADMIN — HYDROMORPHONE HYDROCHLORIDE 0.5 MG: 2 INJECTION, SOLUTION INTRAMUSCULAR; INTRAVENOUS; SUBCUTANEOUS at 23:42

## 2020-06-08 RX ADMIN — ONDANSETRON 4 MG: 2 INJECTION INTRAMUSCULAR; INTRAVENOUS at 21:51

## 2020-06-08 RX ADMIN — Medication 0.5 MG: at 23:42

## 2020-06-09 LAB
BACTERIA UR QL AUTO: ABNORMAL /HPF
BILIRUB UR QL STRIP: NEGATIVE
CLARITY UR: CLEAR
COLOR UR: YELLOW
GLUCOSE UR STRIP-MCNC: NEGATIVE MG/DL
HGB UR QL STRIP.AUTO: NEGATIVE
HYALINE CASTS UR QL AUTO: ABNORMAL /LPF
KETONES UR QL STRIP: NEGATIVE
LEUKOCYTE ESTERASE UR QL STRIP.AUTO: NEGATIVE
NITRITE UR QL STRIP: NEGATIVE
PH UR STRIP.AUTO: <=5 [PH] (ref 5–8)
PROT UR QL STRIP: ABNORMAL
RBC # UR: ABNORMAL /HPF
REF LAB TEST METHOD: ABNORMAL
SP GR UR STRIP: 1.04 (ref 1–1.03)
SQUAMOUS #/AREA URNS HPF: ABNORMAL /HPF
UROBILINOGEN UR QL STRIP: ABNORMAL
WBC UR QL AUTO: ABNORMAL /HPF

## 2020-06-09 PROCEDURE — 94640 AIRWAY INHALATION TREATMENT: CPT

## 2020-06-09 PROCEDURE — 81001 URINALYSIS AUTO W/SCOPE: CPT | Performed by: FAMILY MEDICINE

## 2020-06-09 PROCEDURE — 25010000002 HYDROMORPHONE PER 4 MG: Performed by: FAMILY MEDICINE

## 2020-06-09 PROCEDURE — 99221 1ST HOSP IP/OBS SF/LOW 40: CPT | Performed by: SURGERY

## 2020-06-09 PROCEDURE — 25010000002 HEPARIN (PORCINE) PER 1000 UNITS: Performed by: FAMILY MEDICINE

## 2020-06-09 PROCEDURE — 0D9670Z DRAINAGE OF STOMACH WITH DRAINAGE DEVICE, VIA NATURAL OR ARTIFICIAL OPENING: ICD-10-PCS | Performed by: SURGERY

## 2020-06-09 PROCEDURE — 25010000002 ONDANSETRON PER 1 MG: Performed by: FAMILY MEDICINE

## 2020-06-09 RX ORDER — SODIUM CHLORIDE, SODIUM LACTATE, POTASSIUM CHLORIDE, CALCIUM CHLORIDE 600; 310; 30; 20 MG/100ML; MG/100ML; MG/100ML; MG/100ML
30 INJECTION, SOLUTION INTRAVENOUS CONTINUOUS
Status: DISCONTINUED | OUTPATIENT
Start: 2020-06-09 | End: 2020-06-12 | Stop reason: HOSPADM

## 2020-06-09 RX ORDER — FAMOTIDINE 10 MG/ML
20 INJECTION, SOLUTION INTRAVENOUS EVERY 12 HOURS SCHEDULED
Status: DISCONTINUED | OUTPATIENT
Start: 2020-06-09 | End: 2020-06-12 | Stop reason: HOSPADM

## 2020-06-09 RX ORDER — ONDANSETRON 2 MG/ML
4 INJECTION INTRAMUSCULAR; INTRAVENOUS EVERY 8 HOURS PRN
Status: DISCONTINUED | OUTPATIENT
Start: 2020-06-09 | End: 2020-06-12 | Stop reason: HOSPADM

## 2020-06-09 RX ORDER — SODIUM CHLORIDE, SODIUM LACTATE, POTASSIUM CHLORIDE, CALCIUM CHLORIDE 600; 310; 30; 20 MG/100ML; MG/100ML; MG/100ML; MG/100ML
100 INJECTION, SOLUTION INTRAVENOUS CONTINUOUS
Status: DISCONTINUED | OUTPATIENT
Start: 2020-06-09 | End: 2020-06-09

## 2020-06-09 RX ORDER — HEPARIN SODIUM 5000 [USP'U]/ML
5000 INJECTION, SOLUTION INTRAVENOUS; SUBCUTANEOUS EVERY 12 HOURS SCHEDULED
Status: DISCONTINUED | OUTPATIENT
Start: 2020-06-09 | End: 2020-06-12 | Stop reason: HOSPADM

## 2020-06-09 RX ORDER — HYDROMORPHONE HCL 110MG/55ML
1 PATIENT CONTROLLED ANALGESIA SYRINGE INTRAVENOUS EVERY 4 HOURS PRN
Status: DISCONTINUED | OUTPATIENT
Start: 2020-06-09 | End: 2020-06-10

## 2020-06-09 RX ORDER — ALBUTEROL SULFATE 2.5 MG/3ML
2.5 SOLUTION RESPIRATORY (INHALATION) EVERY 6 HOURS PRN
Status: DISCONTINUED | OUTPATIENT
Start: 2020-06-09 | End: 2020-06-12 | Stop reason: HOSPADM

## 2020-06-09 RX ADMIN — FAMOTIDINE 20 MG: 10 INJECTION INTRAVENOUS at 20:11

## 2020-06-09 RX ADMIN — HYDROMORPHONE HYDROCHLORIDE 1 MG: 2 INJECTION, SOLUTION INTRAMUSCULAR; INTRAVENOUS; SUBCUTANEOUS at 13:56

## 2020-06-09 RX ADMIN — SODIUM CHLORIDE, SODIUM LACTATE, POTASSIUM CHLORIDE, AND CALCIUM CHLORIDE 100 ML/HR: 600; 310; 30; 20 INJECTION, SOLUTION INTRAVENOUS at 04:35

## 2020-06-09 RX ADMIN — HYDROMORPHONE HYDROCHLORIDE 1 MG: 2 INJECTION, SOLUTION INTRAMUSCULAR; INTRAVENOUS; SUBCUTANEOUS at 09:33

## 2020-06-09 RX ADMIN — ONDANSETRON 4 MG: 2 INJECTION INTRAMUSCULAR; INTRAVENOUS at 04:34

## 2020-06-09 RX ADMIN — SODIUM CHLORIDE, SODIUM LACTATE, POTASSIUM CHLORIDE, AND CALCIUM CHLORIDE 150 ML/HR: 600; 310; 30; 20 INJECTION, SOLUTION INTRAVENOUS at 20:12

## 2020-06-09 RX ADMIN — METOPROLOL TARTRATE 25 MG: 25 TABLET, FILM COATED ORAL at 09:33

## 2020-06-09 RX ADMIN — HYDROMORPHONE HYDROCHLORIDE 1 MG: 2 INJECTION, SOLUTION INTRAMUSCULAR; INTRAVENOUS; SUBCUTANEOUS at 04:34

## 2020-06-09 RX ADMIN — METOPROLOL TARTRATE 25 MG: 25 TABLET, FILM COATED ORAL at 21:16

## 2020-06-09 RX ADMIN — HYDROMORPHONE HYDROCHLORIDE 1 MG: 2 INJECTION, SOLUTION INTRAMUSCULAR; INTRAVENOUS; SUBCUTANEOUS at 18:11

## 2020-06-09 RX ADMIN — HEPARIN SODIUM 5000 UNITS: 5000 INJECTION INTRAVENOUS; SUBCUTANEOUS at 20:11

## 2020-06-09 RX ADMIN — ALBUTEROL SULFATE 2.5 MG: 2.5 SOLUTION RESPIRATORY (INHALATION) at 13:26

## 2020-06-09 RX ADMIN — SODIUM CHLORIDE, SODIUM LACTATE, POTASSIUM CHLORIDE, AND CALCIUM CHLORIDE 150 ML/HR: 600; 310; 30; 20 INJECTION, SOLUTION INTRAVENOUS at 12:52

## 2020-06-09 RX ADMIN — FAMOTIDINE 20 MG: 10 INJECTION INTRAVENOUS at 09:34

## 2020-06-09 RX ADMIN — HYDROMORPHONE HYDROCHLORIDE 1 MG: 2 INJECTION, SOLUTION INTRAMUSCULAR; INTRAVENOUS; SUBCUTANEOUS at 22:24

## 2020-06-09 RX ADMIN — HEPARIN SODIUM 5000 UNITS: 5000 INJECTION INTRAVENOUS; SUBCUTANEOUS at 09:33

## 2020-06-09 NOTE — CONSULTS
GENERAL SURGERY CONSULT      Patient Care Team:  Yusuf Jones MD as PCP - General  Joshua Yip MD as Consulting Physician (Cardiology)    Chief complaint  Abdominal pain  Subjective     74-year-old gentleman presents with 1 day history of increasing abdominal pain and distention.  He has had about 4 weeks of diarrhea and he said over the last 24 hours the diarrhea has slowed down.  He did have a loose bowel movement yesterday morning and during the night after being being admitted to the floor from the emergency room he said he had another liquid bowel movement and now no longer has any abdominal distention and his abdomen is soft.  Nursing says that he did fill up an entire canister of fluid after his nasogastric tube was placed.  The output is about 200 cc over the last 5 hours.    He has a history of previous small bowel obstructions and was here just about 3 months ago with bowel obstruction that was managed nonoperatively.  He is known to have a very large ventral hernia with loss of domain.  I have seen him in the past and I have conducted an exploratory lap on him previously and closed his abdominal skin over some Vicryl mesh over the bowel and he actually did heal this wound.  He has had a history of cholecystectomy and appendectomy as well.  His medical history is significant for congestive heart failure as well as cardiac catheterization.  He has had coronary stents.    Review of Systems   All systems were reviewed and negative except for:  Gastrointestinal: positive for diarrhea, nausea, pain and See HPI    History  Past Medical History:   Diagnosis Date   • Bruises easily    • CHF (congestive heart failure) (CMS/HCC)    • Congenital heart defect    • Difficulty attaining erection    • Hypertension    • Low back pain    • Poor circulation    • Prostate cancer (CMS/HCC)    • Shortness of breath    • Stented coronary artery 12/05/2019    MICHAEL to LAD     Past Surgical History:   Procedure  Laterality Date   • APPENDECTOMY     • CARDIAC CATHETERIZATION N/A 2019    Procedure: Left Heart Cath;  Surgeon: Mirza Munson MD;  Location: [facility] CATH INVASIVE LOCATION;  Service: Cardiology   • CARDIAC CATHETERIZATION N/A 2019    Procedure: Stent MICHAEL coronary;  Surgeon: Mirza Munson MD;  Location: [facility] CATH INVASIVE LOCATION;  Service: Cardiology   • CHOLECYSTECTOMY     • ELBOW PROCEDURE      left   • HERNIA REPAIR     • INTERVENTIONAL RADIOLOGY PROCEDURE N/A 2019    Procedure: Intravascular Ultrasound;  Surgeon: Mirza Munson MD;  Location: [facility] CATH INVASIVE LOCATION;  Service: Cardiology   • SD RT/LT HEART CATHETERS N/A 2019    Procedure: Percutaneous Coronary Intervention;  Surgeon: Mirza Munson MD;  Location: [facility] CATH INVASIVE LOCATION;  Service: Cardiology     Family History   Problem Relation Age of Onset   • Alzheimer's disease Mother    • GI problems Father    • Heart disease Father      Social History     Tobacco Use   • Smoking status: Former Smoker     Years: 15.00     Start date: 1966     Last attempt to quit: 5/10/2020     Years since quittin.0   • Smokeless tobacco: Never Used   Substance Use Topics   • Alcohol use: Not Currently     Frequency: Never   • Drug use: No     Medications Prior to Admission   Medication Sig Dispense Refill Last Dose   • aspirin 81 MG EC tablet Take 1 tablet by mouth Daily. 30 tablet 1 2020 at Unknown time   • atorvastatin (LIPITOR) 40 MG tablet Take 1 tablet by mouth Every Night. 30 tablet 1 2020 at Unknown time   • digoxin (LANOXIN) 250 MCG tablet Take 250 mcg by mouth Daily.   2020 at Unknown time   • diltiaZEM CD (CARDIZEM CD) 120 MG 24 hr capsule Take 120 mg by mouth Daily.   2020 at Unknown time   • furosemide (LASIX) 40 MG tablet Take 40 mg by mouth Daily.   2020 at Unknown time   • losartan (COZAAR) 100 MG tablet Take 100 mg by mouth Daily.   2020 at  Unknown time   • LYRICA 150 MG capsule Take 150 mg by mouth 2 (Two) Times a Day.   6/8/2020 at 0800   • metoprolol tartrate (LOPRESSOR) 25 MG tablet Take 1 tablet by mouth Every 12 (Twelve) Hours. 60 tablet 1 6/8/2020 at 0800   • mirtazapine (REMERON) 30 MG tablet Take 30 mg by mouth Every Night.   6/7/2020 at Unknown time   • oxyCODONE-acetaminophen (PERCOCET)  MG per tablet Take 1 tablet by mouth Every 6 (Six) Hours As Needed for Moderate Pain  for up to 30 days. 120 tablet 0    • potassium chloride (K-DUR) 10 MEQ CR tablet Take 10 mEq by mouth 2 (Two) Times a Day.   6/8/2020 at 0800   • prasugrel (EFFIENT) 10 MG tablet Take 10 mg by mouth Daily.   6/8/2020 at Unknown time   • traZODone (DESYREL) 100 MG tablet Take 100 mg by mouth Every Night.   6/7/2020 at Unknown time   • VENTOLIN  (90 Base) MCG/ACT inhaler Inhale 2 puffs Every 6 (Six) Hours As Needed for Wheezing or Shortness of Air.   Taking     Allergies:  Haloperidol; Morphine; and Pantoprazole    Objective     Vital Signs  Temp:  [97.4 °F (36.3 °C)-97.5 °F (36.4 °C)] 97.5 °F (36.4 °C)  Heart Rate:  [69-72] 69  Resp:  [16-20] 20  BP: (132-151)/(58-93) 134/58    Physical Exam:      General Appearance:    Alert, cooperative, in no acute distress   Head:    Normocephalic, without obvious abnormality, atraumatic,    Eyes:            Lids and lashes normal, conjunctivae and sclerae normal, no   icterus, no pallor, corneas clear, PERRLA   Ears:    Ears appear intact with no abnormalities noted   Throat:   No oral lesions, no thrush, oral mucosa moist   Neck:   No adenopathy, supple, trachea midline, no thyromegaly, no   carotid bruit, no JVD   Back:     No kyphosis present, no scoliosis present, no skin lesions,      erythema or scars, no tenderness to percussion or                   palpation,   range of motion normal   Lungs:     Clear to auscultation,respirations regular, even and                  unlabored    Heart:    Regular rhythm and normal  rate, normal S1 and S2, no            murmur, no gallop, no rub, no click   Chest Wall:    No abnormalities observed   Abdomen:    Very large abdominal ventral hernia with midline scarring but the abdomen is soft with no tenderness.   Rectal:     Deferred   Extremities: No edema, good ROM   Pulses:   Pulses palpable and equal bilaterally   Skin:   No bleeding, bruising or rash   Lymph nodes:   No palpable adenopathy   Neurologic:   Cranial nerves 2 - 12 grossly intact, sensation intact, DTR       present and equal bilaterally     Lab Results (last 24 hours)     Procedure Component Value Units Date/Time    Blood Culture - Blood, Arm, Left [[id number]] Collected:  06/08/20 2301    Specimen:  Blood from Arm, Left Updated:  06/08/20 2322    Blood Culture - Blood, Arm, Left [[id number]] Collected:  06/08/20 2310    Specimen:  Blood from Arm, Left Updated:  06/08/20 2322    POC Lactate [[id number]]  (Normal) Collected:  06/08/20 2300    Specimen:  Blood Updated:  06/08/20 2301     Lactate 0.8 mmol/L      Comment: Serial Number: [id number]:  [id number]       BUN [[id number]]  (Normal) Collected:  06/08/20 2140    Specimen:  Blood from Arm, Left Updated:  06/08/20 2226     BUN 23 mg/dL     Comprehensive Metabolic Panel [[id number]]  (Abnormal) Collected:  06/08/20 2140    Specimen:  Blood from Arm, Left Updated:  06/08/20 2225     Glucose 166 mg/dL      BUN --     Comment: Testing performed by alternate method        Creatinine 1.45 mg/dL      Sodium 137 mmol/L      Potassium 4.5 mmol/L      Chloride 102 mmol/L      CO2 23.0 mmol/L      Calcium 9.7 mg/dL      Total Protein 7.3 g/dL      Albumin 4.20 g/dL      ALT (SGPT) 9 U/L      AST (SGOT) 15 U/L      Alkaline Phosphatase 144 U/L      Total Bilirubin 0.9 mg/dL      eGFR Non African Amer 48 mL/min/1.73      Globulin 3.1 gm/dL      A/G Ratio 1.4 g/dL      BUN/Creatinine Ratio --     Comment: Testing not performed.        Anion Gap 12.0 mmol/L     Narrative:       GFR Normal  >60  Chronic Kidney Disease <60  Kidney Failure <15      Lipase [[id number]]  (Normal) Collected:  06/08/20 2140    Specimen:  Blood from Arm, Left Updated:  06/08/20 2225     Lipase 14 U/L     aPTT [[id number]]  (Normal) Collected:  06/08/20 2140    Specimen:  Blood from Arm, Left Updated:  06/08/20 2201     PTT 24.8 seconds     Protime-INR [[id number]]  (Normal) Collected:  06/08/20 2140    Specimen:  Blood from Arm, Left Updated:  06/08/20 2201     Protime 11.0 Seconds      INR 1.06    POC Creatinine [[id number]]  (Abnormal) Collected:  06/08/20 2157    Specimen:  Blood Updated:  06/08/20 2200     Creatinine 1.50 mg/dL      Comment: Serial Number: [id number]:  [id number]        GFR MDRD  --     Comment: Serial Number: [id number]:  [id number]        GFR MDRD Non  --     Comment: Serial Number: [id number]:  [id number]       CBC & Differential [[id number]] Collected:  06/08/20 2140    Specimen:  Blood from Arm, Left Updated:  06/08/20 2148    Narrative:       The following orders were created for panel order CBC & Differential.  Procedure                               Abnormality         Status                     ---------                               -----------         ------                     CBC Auto Differential[[id number]]        Abnormal            Final result                 Please view results for these tests on the individual orders.    CBC Auto Differential [[id number]]  (Abnormal) Collected:  06/08/20 2140    Specimen:  Blood from Arm, Left Updated:  06/08/20 2148     WBC 11.50 10*3/mm3      RBC 5.18 10*6/mm3      Hemoglobin 16.2 g/dL      Hematocrit 47.0 %      MCV 90.8 fL      MCH 31.2 pg      MCHC 34.4 g/dL      RDW 15.7 %      RDW-SD 49.9 fl      MPV 8.1 fL      Platelets 350 10*3/mm3      Neutrophil % 85.6 %      Lymphocyte % 5.6 %      Monocyte % 4.8 %      Eosinophil % 2.8 %      Basophil % 1.2 %      Neutrophils, Absolute 9.80 10*3/mm3      Lymphocytes, Absolute 0.60  10*3/mm3      Monocytes, Absolute 0.60 10*3/mm3      Eosinophils, Absolute 0.30 10*3/mm3      Basophils, Absolute 0.10 10*3/mm3      nRBC 0.1 /100 WBC           Imaging Results (Last 24 Hours)     Procedure Component Value Units Date/Time    CT Abdomen Pelvis With Contrast [[id number]] Collected:  06/08/20 2240     Updated:  06/08/20 2305    Narrative:          DATE OF EXAM:  6/8/2020 10:08 PM     PROCEDURE:  CT ABDOMEN PELVIS W CONTRAST-     INDICATIONS:   abd pain, hx sbo     COMPARISON:   CT of the abdomen and pelvis with contrast dated 03/17/2020     TECHNIQUE:  Routine transaxial slices were obtained through the abdomen and pelvis  after the intravenous administration of 100 mL of Isovue 370.  Reconstructed coronal and sagittal images were also obtained. Automated  exposure control and iterative construction methods were used.     FINDINGS:  The lung bases are free of active disease.   The liver, spleen, pancreas, and adrenal glands are unremarkable. The  gallbladder is surgically absent. There is no biliary dilatation. There  is cortical thinning of the kidneys bilaterally and staghorn calculus in  lower pole the left kidney. No hydroureteronephrosis..The bladder is  unremarkable.  Radiation seeds are present in the prostate gland      There is a high-grade small bowel obstruction with abrupt transition  zone in the mid to distal ileum. The bowel loops proximal to this are  moderately to severely distended and fluid-filled. There is extensive  pneumatosis around distended loops of small bowel in the right upper  abdomen similar in distribution to the study from 03/15/2020 and  suspected severe a small amount of free air within the mesentery of the  same area.  . There is a apparent suture line several distended loops of small bowel  and there is tethering within the mesentery. There is adherent loops of  small bowel to the anterior abdominal wall. Fascial defects along the  right lower abdominal wall are  present best seen on coronal image 49 and  coronal image 31 and coronal image 47. Most of the pneumatosis is in the  anterior right upper abdomen.  . No pathologically enlarged lymph nodes. No loculated fluid  collections.. The abdominal aorta is normal in course and caliber with a  moderate degree of vascular calcification.. No acute bony abnormality or  suspicious focal osseous lesion.          Impression:       High-grade small bowel obstruction evidence of previous bowel surgeries  and suspected adhesions. There are distended loops of small bowel which  are distended up to 7 cm. There are multiple areas of tethering in the  right lower abdomen. There is extensive pneumatosis predominantly around  loops of bowel in the right upper abdomen and suspected small amounts of  free air tracking within the mesentery of the right upper  abdomen.Surgical consultation is recommended. Findings were called to  Olga Faria, the ordering ER provider at 10:45 PM on  06/08/2020     Electronically Signed By-Aly Euceda DO. On:6/8/2020 11:03 PM  This report was finalized on [id number] by  Aly Euceda DO..          Results Review:    I reviewed the patient's new clinical results.  I reviewed the patient's new imaging results and agree with the interpretation.    Assessment/Plan       74-year-old gentleman presents with small bowel obstruction.  He did have a liquid bowel movement 24 hours ago and also just had one a few hours ago.  He says his abdominal distention is much improved.  He has a benign exam.  I have reviewed the CT scan it does call into question some worrisome signs such as some possible free air and free air of the mesentery in the right upper quadrant.  Upon his last admission 3 months ago this was also seen.  It appears to me there could be some air within the wall of the skin overlying the bowel.  This was seen the last time he was here and therefore I believe these are stable changes.  He  does have some small bowel distention but at this time there is no leukocytosis and no tachycardia or fever and no obvious peritoneal signs and therefore I would prefer to continue a nonoperative approach because closing his abdominal wall incredibly challenging.    I will order the nasogastric tube to be clamped and will start clear liquids and observe.  I am not sure of the etiology of his diarrhea over the last 4 weeks.      Cleopatra Wang MD  06/09/20  09:52

## 2020-06-09 NOTE — PLAN OF CARE
Problem: Patient Care Overview  Goal: Plan of Care Review  Outcome: Ongoing (interventions implemented as appropriate)  Note:   Pt is complaining of heartburn today, meds started this morning, see MAR. Pt has medication allergies- education provided. Pt said that he had loose stools for a few weeks, Dr. Wang ordered a C-diff test, started on Contact Spore precautions. Pt is now on clear liquids with NG tube clamped per orders and is tolerating it well. Will continue to monitor.

## 2020-06-09 NOTE — PLAN OF CARE
Problem: Patient Care Overview  Goal: Plan of Care Review  Outcome: Ongoing (interventions implemented as appropriate)  Flowsheets (Taken 6/9/2020 0231)  Progress: no change  Plan of Care Reviewed With: patient  Outcome Summary: pt c/o pain and nausea PRN meds given, pt resting

## 2020-06-09 NOTE — ED PROVIDER NOTES
Subjective   Chief complaint: Abdominal pain      Context: Patient is a 74-year-old male who comes in complaining of abdominal pain and vomiting.  He states he has had diarrhea the last 3 days.  He states he has had prior bowel obstructions and this feels the same.  He states he passed some liquid stool this morning but nothing since then.  He denies any chest pain shortness of breath fever cough.  He notes increasing pain and distention today    Duration: 3 days    Timing: Waxes and wanes    Severity: Moderate    Associated symptoms: Denies          PCP:  carlota            Review of Systems   Constitutional: Negative for fever.   HENT: Negative.    Respiratory: Negative.    Cardiovascular: Negative.    Gastrointestinal: Positive for abdominal pain, diarrhea and vomiting.   Genitourinary: Negative.    Musculoskeletal: Negative.    Skin: Negative.    Neurological: Negative.    Hematological: Does not bruise/bleed easily.       Past Medical History:   Diagnosis Date   • Bruises easily    • CHF (congestive heart failure) (CMS/[facility]ly [facility])    • Congenital heart defect    • Difficulty attaining erection    • Hypertension    • Low back pain    • Poor circulation    • Prostate cancer (CMS/[facility])    • Shortness of breath    • Stented coronary artery 12/05/2019    MICHAEL to LAD       Allergies   Allergen Reactions   • Haloperidol Hives   • Morphine Itching   • Pantoprazole Other (See Comments)     Feels sick after receiving       Past Surgical History:   Procedure Laterality Date   • APPENDECTOMY     • CARDIAC CATHETERIZATION N/A 12/5/2019    Procedure: Left Heart Cath;  Surgeon: Mirza Munson MD;  Location: [facility] CATH INVASIVE LOCATION;  Service: Cardiology   • CARDIAC CATHETERIZATION N/A 12/5/2019    Procedure: Stent MICHAEL coronary;  Surgeon: Mirza Munson MD;  Location: [facility] CATH INVASIVE LOCATION;  Service: Cardiology   • CHOLECYSTECTOMY     • ELBOW PROCEDURE      left   • HERNIA REPAIR     • INTERVENTIONAL  RADIOLOGY PROCEDURE N/A 12/5/2019    Procedure: Intravascular Ultrasound;  Surgeon: Mirza Munson MD;  Location:  [facility] CATH INVASIVE LOCATION;  Service: Cardiology   • NM RT/LT HEART CATHETERS N/A 12/5/2019    Procedure: Percutaneous Coronary Intervention;  Surgeon: Mirza Munson MD;  Location:  [facility] CATH INVASIVE LOCATION;  Service: Cardiology       Family History   Problem Relation Age of Onset   • Alzheimer's disease Mother    • GI problems Father    • Heart disease Father        Social History     Socioeconomic History   • Marital status:      Spouse name: Not on file   • Number of children: Not on file   • Years of education: Not on file   • Highest education level: Not on file   Tobacco Use   • Smoking status: Former Smoker   • Smokeless tobacco: Never Used   Substance and Sexual Activity   • Alcohol use: Not Currently     Frequency: Never   • Drug use: No   • Sexual activity: Defer           Objective   Physical Exam     Vital signs and triage nurse note reviewed.   Constitutional: Awake, alert; well-developed and well-nourished.  Uncomfortable  HEENT: Normocephalic, atraumatic; pupils are PERRL with intact EOM; oropharynx is pink and moist without exudate or erythema.   Neck: Supple, full range of motion without pain; no cervical lymphadenopathy.   Cardiovascular: Regular rate and rhythm, normal S1-S2.   Pulmonary: Respiratory effort regular nonlabored, breath sounds clear to auscultation all fields.   Abdomen: Soft, diffusely tender nondistended with hypoactive bowel sounds; no rebound or guarding. There is no peritoneal rigidity or erythema  Musculoskeletal: Independent range of motion of all extremities with no palpable tenderness or edema.   Neuro: Alert oriented x3, speech is clear and appropriate, GCS 15   Skin:  Fleshtone warm, dry, intact; no erythematous or petechial rash or lesion       Procedures           ED Course  ED Course as of Jun 08 2322 Mon Jun 08, 2020    2305 Spoke with dr otth for carlota    []   2305 Spoke with dr rodriguez      []   2309 I saw and evaluated this patient at bedside    []      ED Course User Index  [] Kat Stephens APRN  [] Micah Ballard DO           Labs Reviewed   COMPREHENSIVE METABOLIC PANEL - Abnormal; Notable for the following components:       Result Value    Glucose 166 (*)     Creatinine 1.45 (*)     Alkaline Phosphatase 144 (*)     eGFR Non  Amer 48 (*)     All other components within normal limits    Narrative:     GFR Normal >60  Chronic Kidney Disease <60  Kidney Failure <15     CBC WITH AUTO DIFFERENTIAL - Abnormal; Notable for the following components:    WBC 11.50 (*)     RDW 15.7 (*)     Neutrophil % 85.6 (*)     Lymphocyte % 5.6 (*)     Monocyte % 4.8 (*)     Neutrophils, Absolute 9.80 (*)     Lymphocytes, Absolute 0.60 (*)     All other components within normal limits   POCT CREATININE - Abnormal; Notable for the following components:    Creatinine 1.50 (*)     All other components within normal limits   LIPASE - Normal   APTT - Normal   PROTIME-INR - Normal   BUN - Normal   POC LACTATE - Normal   BLOOD CULTURE   BLOOD CULTURE   URINALYSIS W/ MICROSCOPIC IF INDICATED (NO CULTURE)   POC LACTATE   CBC AND DIFFERENTIAL    Narrative:     The following orders were created for panel order CBC & Differential.  Procedure                               Abnormality         Status                     ---------                               -----------         ------                     CBC Auto Differential[[id number]]        Abnormal            Final result                 Please view results for these tests on the individual orders.     Medications   sodium chloride 0.9 % flush 10 mL (has no administration in time range)   piperacillin-tazobactam (ZOSYN) IVPB 3.375 g in 100 mL NS (CD) (has no administration in time range)   sodium chloride 0.9 % bolus 500 mL (0 mL Intravenous Stopped 6/8/20 2305)    ondansetron (ZOFRAN) injection 4 mg (4 mg Intravenous Given 6/8/20 2151)   HYDROmorphone (DILAUDID) injection 0.5 mg (0.5 mg Intravenous Given 6/8/20 2151)   iopamidol (ISOVUE-370) 76 % injection 100 mL (100 mL Intravenous Given 6/8/20 2210)   LORazepam (ATIVAN) injection 1 mg (1 mg Intravenous Given 6/8/20 2322)     Ct Abdomen Pelvis With Contrast    Result Date: 6/8/2020  High-grade small bowel obstruction evidence of previous bowel surgeries and suspected adhesions. There are distended loops of small bowel which are distended up to 7 cm. There are multiple areas of tethering in the right lower abdomen. There is extensive pneumatosis predominantly around loops of bowel in the right upper abdomen and suspected small amounts of free air tracking within the mesentery of the right upper abdomen.Surgical consultation is recommended. Findings were called to Olga Faria, the ordering ER provider at 10:45 PM on 06/08/2020  Electronically Signed By-Aly Euceda DO. On:6/8/2020 11:03 PM This report was finalized on [id number] by  Aly Euceda DO..                                    MDM  Number of Diagnoses or Management Options  Generalized abdominal pain:   Nausea and vomiting, intractability of vomiting not specified, unspecified vomiting type:   Diagnosis management comments: Chart review: Patient was admitted  3/15-3/19/2020: sbo; gore without surgical intervention      Comorbidities:  has a past medical history of Bruises easily, CHF (congestive heart failure) (CMS/HCC), Congenital heart defect, Difficulty attaining erection, Hypertension, Low back pain, Poor circulation, Prostate cancer (CMS/HCC), Shortness of breath, and Stented coronary artery (12/05/2019).  Differentials: Hemic bowel bowel obstruction not all inclusive of differentials considered  Discussion with provider: janey rodriguez  Radiology interpretation:  X-rays reviewed by me and interpreted by radiologist,   Ct  Abdomen Pelvis With Contrast    Result Date: 6/8/2020  High-grade small bowel obstruction evidence of previous bowel surgeries and suspected adhesions. There are distended loops of small bowel which are distended up to 7 cm. There are multiple areas of tethering in the right lower abdomen. There is extensive pneumatosis predominantly around loops of bowel in the right upper abdomen and suspected small amounts of free air tracking within the mesentery of the right upper abdomen.Surgical consultation is recommended. Findings were called to Olga Faria, the ordering ER provider at 10:45 PM on 06/08/2020  Electronically Signed By-Aly Euceda DO. On:6/8/2020 11:03 PM This report was finalized on [id number] by  Aly Euceda DO..    Lab interpretation:  Labs viewed by me significant for, white blood cell count 11.5    Appropriate PPE worn during exam.  Discussed with the family doctor who agreed to admit.  Discussed with on-call surgeon Dr. Lynch.  Discussed with Dr. cardoso who also saw and evaluated patient.  On reexam patient has had no vomiting but NG tube was placed for decompression, he was given Ativan.  Continues to have no peritoneal rigidity or guarding, states his pain has improved after medication and is resting comfortably.  Patient was given Zosyn      i discussed findings with patient who voices understanding of d admission        Final diagnoses:   Generalized abdominal pain   Nausea and vomiting, intractability of vomiting not specified, unspecified vomiting type   Small bowel obstruction (CMS/HCC)            Kat Stephens, APRN  06/08/20 9323

## 2020-06-09 NOTE — PROGRESS NOTES
Discharge Planning Assessment   Claus     Patient Name: Ro Nathan  MRN: [id number]  Today's Date: 6/9/2020    Admit Date: 6/8/2020    Discharge Needs Assessment     Row Name 06/09/20 1426       Living Environment    Lives With  spouse    Current Living Arrangements  home/apartment/condo    Potentially Unsafe Housing Conditions  unable to assess    Primary Care Provided by  self    Provides Primary Care For  no one    Family Caregiver if Needed  spouse    Able to Return to Prior Arrangements  yes       Resource/Environmental Concerns    Resource/Environmental Concerns  none    Transportation Concerns  car, none       Transition Planning    Patient/Family Anticipates Transition to  home with family    Patient/Family Anticipated Services at Transition  none    Transportation Anticipated  car, drives self       Discharge Needs Assessment    Readmission Within the Last 30 Days  no previous admission in last 30 days    Concerns to be Addressed  no discharge needs identified    Equipment Currently Used at Home  none    Anticipated Changes Related to Illness  none    Provided Post Acute Provider List?  N/A    N/A Provider List Comment  spouse denies any needs for discharge        Discharge Plan     Row Name 06/09/20 1429       Plan    Plan  anticipate return home    Patient/Family in Agreement with Plan  yes    Plan Comments  attempted to speak to patient on phone and unable to reach on phone due to covid-19; called spouse and reviewed patient needs; she states has no dme and continues to work      Patient Forms    Important Message from Medicare (IMM)  Delivered    Delivered to  Support person reviewed with spouse arina on phone and she verbalizes understanding of letter and does not need a coppy of letter    Method of delivery  Telephone            Carol naegele rn  Case management  Office number [phone]  Cell phone [phone]

## 2020-06-09 NOTE — H&P
Patient Care Team:  Yusuf Jones MD as PCP - General  [facility] Consulting Physician (Cardiology)    Chief Conplaint  Subjective     The patient is a 74 y.o. male presents with acute abdominal pain and diarrhea with evidence of high-grade SBO.    HPI  Hx large ventral hernia maintained for years in a patient with high surgical risk.  He developed progressive abdominal pain with diarrhea and was found after transport to [facility] to have a large SBO.  He is S/P NGT placementand is feeling somewhat better but c/o SS pyrosis.  So associated complaints.    Review of Systems  Review of Systems   Constitutional: Positive for activity change and appetite change.   Respiratory: Negative for chest tightness.    Cardiovascular: Negative.    Gastrointestinal: Positive for abdominal distention, abdominal pain, diarrhea, nausea and vomiting.   Genitourinary: Negative.    Musculoskeletal: Positive for back pain.   Neurological: Positive for weakness.       History  Past Medical History:   Diagnosis Date   • Bruises easily    • CHF (congestive heart failure) (CMS/HCC)    • Congenital heart defect    • Difficulty attaining erection    • Hypertension    • Low back pain    • Poor circulation    • Prostate cancer (CMS/HCC)    • Shortness of breath    • Stented coronary artery 12/05/2019    MICHAEL to LAD     Past Surgical History:   Procedure Laterality Date   • APPENDECTOMY     • CARDIAC CATHETERIZATION N/A 12/5/2019    Procedure: Left Heart Cath;  Surgeon: Mirza Munson MD;  Location: [facility] CATH INVASIVE LOCATION;  Service: Cardiology   • CARDIAC CATHETERIZATION N/A 12/5/2019    Procedure: Stent MICHAEL coronary;  Surgeon: Mirza Munson MD;  Location: [facility] CATH INVASIVE LOCATION;  Service: Cardiology   • CHOLECYSTECTOMY     • ELBOW PROCEDURE      left   • HERNIA REPAIR     • INTERVENTIONAL RADIOLOGY PROCEDURE N/A 12/5/2019    Procedure: Intravascular Ultrasound;  Surgeon: Arpit  Mirza Laurent MD;  Location:  [facility] CATH INVASIVE LOCATION;  Service: Cardiology   • MT RT/LT HEART CATHETERS N/A 2019    Procedure: Percutaneous Coronary Intervention;  Surgeon: iMrza Munson MD;  Location:  [facility] CATH INVASIVE LOCATION;  Service: Cardiology     Family History   Problem Relation Age of Onset   • Alzheimer's disease Mother    • GI problems Father    • Heart disease Father      Social History     Tobacco Use   • Smoking status: Former Smoker     Years: 15.00     Start date: 1966     Last attempt to quit: 5/10/2020     Years since quittin.0   • Smokeless tobacco: Never Used   Substance Use Topics   • Alcohol use: Not Currently     Frequency: Never   • Drug use: No     Allergies:  Haloperidol; Morphine; and Pantoprazole    Objective     Vital Signs  Temp:  [97.4 °F (36.3 °C)-97.5 °F (36.4 °C)] 97.5 °F (36.4 °C)  Heart Rate:  [71-72] 71  Resp:  [16-20] 20  BP: (132-151)/(64-93) 133/78      Physical Exam:   Physical Exam   Constitutional: He appears well-developed and well-nourished.   HENT:   Head: Normocephalic and atraumatic.   Cardiovascular: Regular rhythm.   Pulmonary/Chest: Effort normal.   Abdominal: Soft. He exhibits distension. He exhibits no mass. There is tenderness. There is no rebound and no guarding. No hernia.   Musculoskeletal: Normal range of motion.   Neurological: He is alert.   Skin: Skin is warm.   Nursing note and vitals reviewed.           Results Review:   CBC    Results from last 7 days   Lab Units 20  2140   WBC 10*3/mm3 11.50*   HEMOGLOBIN g/dL 16.2   PLATELETS 10*3/mm3 350     BMP   Results from last 7 days   Lab Units 20  2157 20  2140   SODIUM mmol/L  --  137   POTASSIUM mmol/L  --  4.5   CHLORIDE mmol/L  --  102   CO2 mmol/L  --  23.0   BUN   --  23   CREATININE mg/dL 1.50* 1.45*   GLUCOSE mg/dL  --  166*     Cr Clearance Estimated Creatinine Clearance: 57 mL/min (A) (by C-G formula based on SCr of 1.5 mg/dL (H)).  Coag    Results from last 7 days   Lab Units 06/08/20  2140   INR  1.06   APTT seconds 24.8     HbA1C No results found for: HGBA1C  Blood Glucose No results found for: POCGLU  Infection     CMP   Results from last 7 days   Lab Units 06/08/20 2157 06/08/20  2140   SODIUM mmol/L  --  137   POTASSIUM mmol/L  --  4.5   CHLORIDE mmol/L  --  102   CO2 mmol/L  --  23.0   BUN   --  23   CREATININE mg/dL 1.50* 1.45*   GLUCOSE mg/dL  --  166*   ALBUMIN g/dL  --  4.20   BILIRUBIN mg/dL  --  0.9   ALK PHOS U/L  --  144*   AST (SGOT) U/L  --  15   ALT (SGPT) U/L  --  9   LIPASE U/L  --  14     UA      Radiology(recent) Ct Abdomen Pelvis With Contrast    Result Date: 6/8/2020  High-grade small bowel obstruction evidence of previous bowel surgeries and suspected adhesions. There are distended loops of small bowel which are distended up to 7 cm. There are multiple areas of tethering in the right lower abdomen. There is extensive pneumatosis predominantly around loops of bowel in the right upper abdomen and suspected small amounts of free air tracking within the mesentery of the right upper abdomen.Surgical consultation is recommended. Findings were called to Olga Faria, the ordering ER provider at 10:45 PM on 06/08/2020  Electronically Signed By-Aly Euceda DO. On:6/8/2020 11:03 PM This report was finalized on [id number] by  Aly Euceda DO..       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     Yusuf Jones MD  06/09/20  08:55

## 2020-06-10 LAB
ANION GAP SERPL CALCULATED.3IONS-SCNC: 8 MMOL/L (ref 5–15)
BASOPHILS # BLD AUTO: 0.1 10*3/MM3 (ref 0–0.2)
BASOPHILS NFR BLD AUTO: 1.1 % (ref 0–1.5)
BUN BLD-MCNC: 17 MG/DL (ref 8–23)
BUN BLD-MCNC: ABNORMAL MG/DL
BUN/CREAT SERPL: ABNORMAL
CALCIUM SPEC-SCNC: 8.6 MG/DL (ref 8.6–10.5)
CHLORIDE SERPL-SCNC: 103 MMOL/L (ref 98–107)
CO2 SERPL-SCNC: 26 MMOL/L (ref 22–29)
CREAT BLD-MCNC: 1.09 MG/DL (ref 0.76–1.27)
DEPRECATED RDW RBC AUTO: 50.3 FL (ref 37–54)
EOSINOPHIL # BLD AUTO: 0.3 10*3/MM3 (ref 0–0.4)
EOSINOPHIL NFR BLD AUTO: 5 % (ref 0.3–6.2)
ERYTHROCYTE [DISTWIDTH] IN BLOOD BY AUTOMATED COUNT: 15.6 % (ref 12.3–15.4)
GFR SERPL CREATININE-BSD FRML MDRD: 66 ML/MIN/1.73
GLUCOSE BLD-MCNC: 130 MG/DL (ref 65–99)
HCT VFR BLD AUTO: 38.4 % (ref 37.5–51)
HGB BLD-MCNC: 13.2 G/DL (ref 13–17.7)
LYMPHOCYTES # BLD AUTO: 1.1 10*3/MM3 (ref 0.7–3.1)
LYMPHOCYTES NFR BLD AUTO: 18.1 % (ref 19.6–45.3)
MCH RBC QN AUTO: 31.6 PG (ref 26.6–33)
MCHC RBC AUTO-ENTMCNC: 34.4 G/DL (ref 31.5–35.7)
MCV RBC AUTO: 91.8 FL (ref 79–97)
MONOCYTES # BLD AUTO: 0.6 10*3/MM3 (ref 0.1–0.9)
MONOCYTES NFR BLD AUTO: 9.8 % (ref 5–12)
NEUTROPHILS # BLD AUTO: 4.1 10*3/MM3 (ref 1.7–7)
NEUTROPHILS NFR BLD AUTO: 66 % (ref 42.7–76)
NRBC BLD AUTO-RTO: 0 /100 WBC (ref 0–0.2)
PLATELET # BLD AUTO: 221 10*3/MM3 (ref 140–450)
PMV BLD AUTO: 8.1 FL (ref 6–12)
POTASSIUM BLD-SCNC: 3.8 MMOL/L (ref 3.5–5.2)
RBC # BLD AUTO: 4.18 10*6/MM3 (ref 4.14–5.8)
SODIUM BLD-SCNC: 137 MMOL/L (ref 136–145)
WBC NRBC COR # BLD: 6.2 10*3/MM3 (ref 3.4–10.8)

## 2020-06-10 PROCEDURE — 25010000002 PROMETHAZINE PER 50 MG: Performed by: FAMILY MEDICINE

## 2020-06-10 PROCEDURE — 25010000002 HYDROMORPHONE PER 4 MG: Performed by: FAMILY MEDICINE

## 2020-06-10 PROCEDURE — 25010000002 HEPARIN (PORCINE) PER 1000 UNITS: Performed by: FAMILY MEDICINE

## 2020-06-10 PROCEDURE — 80048 BASIC METABOLIC PNL TOTAL CA: CPT | Performed by: FAMILY MEDICINE

## 2020-06-10 PROCEDURE — 25010000002 ONDANSETRON PER 1 MG: Performed by: FAMILY MEDICINE

## 2020-06-10 PROCEDURE — 85025 COMPLETE CBC W/AUTO DIFF WBC: CPT | Performed by: FAMILY MEDICINE

## 2020-06-10 PROCEDURE — 99233 SBSQ HOSP IP/OBS HIGH 50: CPT | Performed by: NURSE PRACTITIONER

## 2020-06-10 PROCEDURE — 94799 UNLISTED PULMONARY SVC/PX: CPT

## 2020-06-10 RX ORDER — PRASUGREL 10 MG/1
10 TABLET, FILM COATED ORAL DAILY
Status: DISCONTINUED | OUTPATIENT
Start: 2020-06-11 | End: 2020-06-12 | Stop reason: HOSPADM

## 2020-06-10 RX ORDER — DILTIAZEM HYDROCHLORIDE 120 MG/1
120 CAPSULE, COATED, EXTENDED RELEASE ORAL DAILY
Status: DISCONTINUED | OUTPATIENT
Start: 2020-06-11 | End: 2020-06-12 | Stop reason: HOSPADM

## 2020-06-10 RX ORDER — OXYCODONE HYDROCHLORIDE 5 MG/1
10 TABLET ORAL EVERY 6 HOURS PRN
Status: DISCONTINUED | OUTPATIENT
Start: 2020-06-10 | End: 2020-06-12 | Stop reason: HOSPADM

## 2020-06-10 RX ORDER — HYDROMORPHONE HCL 110MG/55ML
0.5 PATIENT CONTROLLED ANALGESIA SYRINGE INTRAVENOUS
Status: DISCONTINUED | OUTPATIENT
Start: 2020-06-10 | End: 2020-06-12 | Stop reason: HOSPADM

## 2020-06-10 RX ORDER — DIGOXIN 250 MCG
250 TABLET ORAL
Status: DISCONTINUED | OUTPATIENT
Start: 2020-06-11 | End: 2020-06-12 | Stop reason: HOSPADM

## 2020-06-10 RX ADMIN — HYDROMORPHONE HYDROCHLORIDE 0.5 MG: 2 INJECTION, SOLUTION INTRAMUSCULAR; INTRAVENOUS; SUBCUTANEOUS at 19:29

## 2020-06-10 RX ADMIN — ONDANSETRON 4 MG: 2 INJECTION INTRAMUSCULAR; INTRAVENOUS at 02:14

## 2020-06-10 RX ADMIN — HYDROMORPHONE HYDROCHLORIDE 0.5 MG: 2 INJECTION, SOLUTION INTRAMUSCULAR; INTRAVENOUS; SUBCUTANEOUS at 14:41

## 2020-06-10 RX ADMIN — HYDROMORPHONE HYDROCHLORIDE 0.5 MG: 2 INJECTION, SOLUTION INTRAMUSCULAR; INTRAVENOUS; SUBCUTANEOUS at 23:53

## 2020-06-10 RX ADMIN — SODIUM CHLORIDE, SODIUM LACTATE, POTASSIUM CHLORIDE, AND CALCIUM CHLORIDE 150 ML/HR: 600; 310; 30; 20 INJECTION, SOLUTION INTRAVENOUS at 19:28

## 2020-06-10 RX ADMIN — OXYCODONE HYDROCHLORIDE 10 MG: 5 TABLET ORAL at 15:59

## 2020-06-10 RX ADMIN — ONDANSETRON 4 MG: 2 INJECTION INTRAMUSCULAR; INTRAVENOUS at 15:59

## 2020-06-10 RX ADMIN — HYDROMORPHONE HYDROCHLORIDE 0.5 MG: 2 INJECTION, SOLUTION INTRAMUSCULAR; INTRAVENOUS; SUBCUTANEOUS at 08:40

## 2020-06-10 RX ADMIN — SODIUM CHLORIDE, SODIUM LACTATE, POTASSIUM CHLORIDE, AND CALCIUM CHLORIDE 150 ML/HR: 600; 310; 30; 20 INJECTION, SOLUTION INTRAVENOUS at 02:15

## 2020-06-10 RX ADMIN — HEPARIN SODIUM 5000 UNITS: 5000 INJECTION INTRAVENOUS; SUBCUTANEOUS at 20:46

## 2020-06-10 RX ADMIN — METOPROLOL TARTRATE 25 MG: 25 TABLET, FILM COATED ORAL at 20:46

## 2020-06-10 RX ADMIN — HYDROMORPHONE HYDROCHLORIDE 1 MG: 2 INJECTION, SOLUTION INTRAMUSCULAR; INTRAVENOUS; SUBCUTANEOUS at 03:48

## 2020-06-10 RX ADMIN — FAMOTIDINE 20 MG: 10 INJECTION INTRAVENOUS at 08:41

## 2020-06-10 RX ADMIN — FAMOTIDINE 20 MG: 10 INJECTION INTRAVENOUS at 20:46

## 2020-06-10 RX ADMIN — HEPARIN SODIUM 5000 UNITS: 5000 INJECTION INTRAVENOUS; SUBCUTANEOUS at 08:40

## 2020-06-10 RX ADMIN — Medication 10 ML: at 08:40

## 2020-06-10 RX ADMIN — METOPROLOL TARTRATE 25 MG: 25 TABLET, FILM COATED ORAL at 08:40

## 2020-06-10 RX ADMIN — ALBUTEROL SULFATE 2.5 MG: 2.5 SOLUTION RESPIRATORY (INHALATION) at 15:30

## 2020-06-10 RX ADMIN — SODIUM CHLORIDE 12.5 MG: 900 INJECTION, SOLUTION INTRAVENOUS at 20:46

## 2020-06-10 NOTE — PROGRESS NOTES
LOS: 1 day   Patient Care Team:  Yusuf Jones MD as PCP - General  Joshua Yip MD as Consulting Physician (Cardiology)    Subjective:  Continued abdominal pain and nausea necessitating replacement of nasogastric tube    Objective:   Afebrile      Review of Systems:   Review of Systems   Constitutional: Positive for appetite change and fever.   Respiratory: Negative.    Cardiovascular: Negative.    Gastrointestinal: Positive for abdominal distention, abdominal pain, diarrhea, nausea and vomiting. Negative for anal bleeding and rectal pain.   Musculoskeletal: Positive for back pain.   Neurological: Negative.            Vital Signs  Temp:  [97.4 °F (36.3 °C)-98 °F (36.7 °C)] 98 °F (36.7 °C)  Heart Rate:  [69-78] 74  Resp:  [18-20] 20  BP: (127-153)/(58-92) 148/92    Physical Exam:  Physical Exam     Radiology:  Ct Abdomen Pelvis With Contrast    Result Date: 6/8/2020  High-grade small bowel obstruction evidence of previous bowel surgeries and suspected adhesions. There are distended loops of small bowel which are distended up to 7 cm. There are multiple areas of tethering in the right lower abdomen. There is extensive pneumatosis predominantly around loops of bowel in the right upper abdomen and suspected small amounts of free air tracking within the mesentery of the right upper abdomen.Surgical consultation is recommended. Findings were called to Olga Faria, the ordering ER provider at 10:45 PM on 06/08/2020  Electronically Signed By-Aly Euceda DO. On:6/8/2020 11:03 PM This report was finalized on [id number] by  Aly Euceda DO..         Results Review:     I reviewed the patient's new clinical results.  I reviewed the patient's new imaging results and agree with the interpretation.    Medication Review:   Scheduled Meds:  famotidine 20 mg Intravenous Q12H   heparin (porcine) 5,000 Units Subcutaneous Q12H   metoprolol tartrate 25 mg Oral Q12H     Continuous  Infusions:  lactated ringers 150 mL/hr Last Rate: 150 mL/hr (06/10/20 0215)     PRN Meds:.•  albuterol  •  HYDROmorphone  •  ondansetron  •  promethazine  •  [COMPLETED] Insert peripheral IV **AND** sodium chloride    Labs:    CBC    Results from last 7 days   Lab Units 06/10/20  0254 06/08/20  2140   WBC 10*3/mm3 6.20 11.50*   HEMOGLOBIN g/dL 13.2 16.2   PLATELETS 10*3/mm3 221 350     BMP Results from last 7 days   Lab Units 06/10/20  0254 06/08/20  2157 06/08/20  2140   SODIUM mmol/L 137  --  137   POTASSIUM mmol/L 3.8  --  4.5   CHLORIDE mmol/L 103  --  102   CO2 mmol/L 26.0  --  23.0   BUN  17  --  23   CREATININE mg/dL 1.09 1.50* 1.45*   GLUCOSE mg/dL 130*  --  166*     Cr Clearance Estimated Creatinine Clearance: 78.4 mL/min (by C-G formula based on SCr of 1.09 mg/dL).  Coag   Results from last 7 days   Lab Units 06/08/20  2140   INR  1.06   APTT seconds 24.8     HbA1C No results found for: HGBA1C  Blood Glucose No results found for: POCGLU  Infection Results from last 7 days   Lab Units 06/08/20  2310 06/08/20  2301   BLOODCX  No growth at 24 hours No growth at 24 hours     CMP Results from last 7 days   Lab Units 06/10/20  0254 06/08/20  2157 06/08/20  2140   SODIUM mmol/L 137  --  137   POTASSIUM mmol/L 3.8  --  4.5   CHLORIDE mmol/L 103  --  102   CO2 mmol/L 26.0  --  23.0   BUN  17  --  23   CREATININE mg/dL 1.09 1.50* 1.45*   GLUCOSE mg/dL 130*  --  166*   ALBUMIN g/dL  --   --  4.20   BILIRUBIN mg/dL  --   --  0.9   ALK PHOS U/L  --   --  144*   AST (SGOT) U/L  --   --  15   ALT (SGPT) U/L  --   --  9   LIPASE U/L  --   --  14     UA  Results from last 7 days   Lab Units 06/09/20  1628   NITRITE UA  Negative   WBC UA /HPF 6-12*   BACTERIA UA /HPF Trace*   SQUAM EPITHEL UA /HPF 0-2     Radiology(recent) Ct Abdomen Pelvis With Contrast    Result Date: 6/8/2020  High-grade small bowel obstruction evidence of previous bowel surgeries and suspected adhesions. There are distended loops of small bowel which  are distended up to 7 cm. There are multiple areas of tethering in the right lower abdomen. There is extensive pneumatosis predominantly around loops of bowel in the right upper abdomen and suspected small amounts of free air tracking within the mesentery of the right upper abdomen.Surgical consultation is recommended. Findings were called to Olga Faria, the ordering ER provider at 10:45 PM on 06/08/2020  Electronically Signed By-Aly Euceda DO. On:6/8/2020 11:03 PM This report was finalized on [id number] by  Aly Euceda DO..     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

## 2020-06-10 NOTE — PROGRESS NOTES
LOS: 1 day   Patient Care Team:  Yusuf Jones MD as PCP - General  Joshua Yip MD as Consulting Physician (Cardiology)    Chief Complaint: abdominal pain   Interval History:   Patient Denies: Vomiting/ nausea  History taken from: Patient    74 year old male with hx of generalized abdominal pain. Today denying nausea/vomiting. Has had 1 small bowel movement this morning that was liquid in form. Able to tolerate 2 sprites by mouth with NG tube clamped. Mild-moderate generalized abdominal pain reported mainly in the lower abdomen.     Objective     Vital Signs  Temp:  [97.4 °F (36.3 °C)-98 °F (36.7 °C)] 98 °F (36.7 °C)  Heart Rate:  [72-78] 74  Resp:  [18-20] 20  BP: (127-153)/(78-92) 150/88      Physical Exam:   Heart: RR   Lungs:  Expiratory wheezing right and left lobes    Abd:  Positive for abdominal distention, mild-mod abdominal pain, no nausea/vomiting    Ext:  No clubbing, cyanosis, edema     Results Review:     I reviewed the patient's new clinical results and recent vitals.    Lab Results (last 24 hours)     Procedure Component Value Units Date/Time    BUN [[id number]]  (Normal) Collected:  06/10/20 0254    Specimen:  Blood Updated:  06/10/20 0729     BUN 17 mg/dL     Basic Metabolic Panel [[id number]]  (Abnormal) Collected:  06/10/20 0254    Specimen:  Blood Updated:  06/10/20 0510     Glucose 130 mg/dL      BUN --     Comment: Testing performed by alternate method        Creatinine 1.09 mg/dL      Sodium 137 mmol/L      Potassium 3.8 mmol/L      Chloride 103 mmol/L      CO2 26.0 mmol/L      Calcium 8.6 mg/dL      eGFR Non African Amer 66 mL/min/1.73      BUN/Creatinine Ratio --     Comment: Testing not performed.        Anion Gap 8.0 mmol/L     Narrative:       GFR Normal >60  Chronic Kidney Disease <60  Kidney Failure <15      CBC & Differential [[id number]] Collected:  06/10/20 0254    Specimen:  Blood Updated:  06/10/20 0422    Narrative:       The following orders were created for panel  order CBC & Differential.  Procedure                               Abnormality         Status                     ---------                               -----------         ------                     CBC Auto Differential[[id number]]        Abnormal            Final result                 Please view results for these tests on the individual orders.    CBC Auto Differential [[id number]]  (Abnormal) Collected:  06/10/20 0254    Specimen:  Blood Updated:  06/10/20 0422     WBC 6.20 10*3/mm3      RBC 4.18 10*6/mm3      Hemoglobin 13.2 g/dL      Comment: Result checked         Hematocrit 38.4 %      MCV 91.8 fL      MCH 31.6 pg      MCHC 34.4 g/dL      RDW 15.6 %      RDW-SD 50.3 fl      MPV 8.1 fL      Platelets 221 10*3/mm3      Neutrophil % 66.0 %      Lymphocyte % 18.1 %      Monocyte % 9.8 %      Eosinophil % 5.0 %      Basophil % 1.1 %      Neutrophils, Absolute 4.10 10*3/mm3      Lymphocytes, Absolute 1.10 10*3/mm3      Monocytes, Absolute 0.60 10*3/mm3      Eosinophils, Absolute 0.30 10*3/mm3      Basophils, Absolute 0.10 10*3/mm3      nRBC 0.0 /100 WBC     Blood Culture - Blood, Arm, Left [[id number]] Collected:  06/08/20 2310    Specimen:  Blood from Arm, Left Updated:  06/09/20 2331     Blood Culture No growth at 24 hours    Blood Culture - Blood, Arm, Left [[id number]] Collected:  06/08/20 2301    Specimen:  Blood from Arm, Left Updated:  06/09/20 2331     Blood Culture No growth at 24 hours    Urinalysis, Microscopic Only - Urine, Clean Catch [[id number]]  (Abnormal) Collected:  06/09/20 1628    Specimen:  Urine, Clean Catch Updated:  06/09/20 2211     RBC, UA 0-2 /HPF      WBC, UA 6-12 /HPF      Bacteria, UA Trace /HPF      Squamous Epithelial Cells, UA 0-2 /HPF      Hyaline Casts, UA 0-2 /LPF      Methodology Automated Microscopy    Urinalysis With Microscopic If Indicated (No Culture) - Urine, Clean Catch [[id number]]  (Abnormal) Collected:  06/09/20 1628    Specimen:  Urine, Clean Catch Updated:  06/09/20  2201     Color, UA Yellow     Appearance, UA Clear     pH, UA <=5.0     Specific Gravity, UA 1.040     Glucose, UA Negative     Ketones, UA Negative     Bilirubin, UA Negative     Blood, UA Negative     Protein, UA 30 mg/dL (1+)     Leuk Esterase, UA Negative     Nitrite, UA Negative     Urobilinogen, UA 0.2 E.U./dL          Assessment/Plan:    Generalized abdominal pain      74 y.o. male  With a hx of several abdominal surgeries including various hernia repairs. Admitted yesterday for nausea/ vomiting and abdominal pain.Ct scan showed evidence of small bowel obstruction. Ng tube was initiated. Today, patient doing much better. Able to tolerate PO liquids with ng tube clamped. Denying nausea/ vomiting. Has had 1 small bowel movement today that was liquid in form. Labs and vitals reviewed and stable. Will discontinue ng tube today. Diet will be slowly advanced as tolerated.       KOURTNEY Randhawa  [facility]  06/10/20

## 2020-06-10 NOTE — PLAN OF CARE
Problem: Patient Care Overview  Goal: Plan of Care Review  Outcome: Ongoing (interventions implemented as appropriate)  Flowsheets (Taken 6/10/2020 6188)  Progress: no change  Plan of Care Reviewed With: patient  Outcome Summary: pt c/o pain and heartburn off and on throughout the shift, one time even was nauseaed, pt has been on clear diet, pt put on CO2 detector

## 2020-06-10 NOTE — PLAN OF CARE
Problem: Patient Care Overview  Goal: Plan of Care Review  Outcome: Ongoing (interventions implemented as appropriate)  Flowsheets (Taken 6/10/2020 5969)  Progress: no change  Plan of Care Reviewed With: patient   Will continue to monitor

## 2020-06-11 LAB
ANION GAP SERPL CALCULATED.3IONS-SCNC: 8 MMOL/L (ref 5–15)
BUN BLD-MCNC: 12 MG/DL (ref 8–23)
BUN BLD-MCNC: ABNORMAL MG/DL
BUN/CREAT SERPL: ABNORMAL
CALCIUM SPEC-SCNC: 8.7 MG/DL (ref 8.6–10.5)
CHLORIDE SERPL-SCNC: 103 MMOL/L (ref 98–107)
CO2 SERPL-SCNC: 27 MMOL/L (ref 22–29)
CREAT BLD-MCNC: 0.94 MG/DL (ref 0.76–1.27)
DEPRECATED RDW RBC AUTO: 48.6 FL (ref 37–54)
ERYTHROCYTE [DISTWIDTH] IN BLOOD BY AUTOMATED COUNT: 15.3 % (ref 12.3–15.4)
GFR SERPL CREATININE-BSD FRML MDRD: 79 ML/MIN/1.73
GLUCOSE BLD-MCNC: 128 MG/DL (ref 65–99)
HCT VFR BLD AUTO: 35.3 % (ref 37.5–51)
HGB BLD-MCNC: 12.4 G/DL (ref 13–17.7)
MCH RBC QN AUTO: 32.1 PG (ref 26.6–33)
MCHC RBC AUTO-ENTMCNC: 35.3 G/DL (ref 31.5–35.7)
MCV RBC AUTO: 90.9 FL (ref 79–97)
PLATELET # BLD AUTO: 203 10*3/MM3 (ref 140–450)
PMV BLD AUTO: 8.5 FL (ref 6–12)
POTASSIUM BLD-SCNC: 4.2 MMOL/L (ref 3.5–5.2)
RBC # BLD AUTO: 3.88 10*6/MM3 (ref 4.14–5.8)
SODIUM BLD-SCNC: 138 MMOL/L (ref 136–145)
WBC NRBC COR # BLD: 6.1 10*3/MM3 (ref 3.4–10.8)

## 2020-06-11 PROCEDURE — 94799 UNLISTED PULMONARY SVC/PX: CPT

## 2020-06-11 PROCEDURE — 25010000002 ONDANSETRON PER 1 MG: Performed by: FAMILY MEDICINE

## 2020-06-11 PROCEDURE — 80048 BASIC METABOLIC PNL TOTAL CA: CPT | Performed by: FAMILY MEDICINE

## 2020-06-11 PROCEDURE — 99233 SBSQ HOSP IP/OBS HIGH 50: CPT | Performed by: SURGERY

## 2020-06-11 PROCEDURE — 85027 COMPLETE CBC AUTOMATED: CPT | Performed by: FAMILY MEDICINE

## 2020-06-11 PROCEDURE — 25010000002 HEPARIN (PORCINE) PER 1000 UNITS: Performed by: FAMILY MEDICINE

## 2020-06-11 PROCEDURE — 25010000002 HYDROMORPHONE PER 4 MG: Performed by: FAMILY MEDICINE

## 2020-06-11 RX ADMIN — METOPROLOL TARTRATE 25 MG: 25 TABLET, FILM COATED ORAL at 08:31

## 2020-06-11 RX ADMIN — HYDROMORPHONE HYDROCHLORIDE 0.5 MG: 2 INJECTION, SOLUTION INTRAMUSCULAR; INTRAVENOUS; SUBCUTANEOUS at 18:15

## 2020-06-11 RX ADMIN — DIGOXIN 250 MCG: 250 TABLET ORAL at 10:58

## 2020-06-11 RX ADMIN — Medication 10 ML: at 08:32

## 2020-06-11 RX ADMIN — ALBUTEROL SULFATE 2.5 MG: 2.5 SOLUTION RESPIRATORY (INHALATION) at 08:18

## 2020-06-11 RX ADMIN — OXYCODONE HYDROCHLORIDE 10 MG: 5 TABLET ORAL at 02:20

## 2020-06-11 RX ADMIN — ALBUTEROL SULFATE 2.5 MG: 2.5 SOLUTION RESPIRATORY (INHALATION) at 18:46

## 2020-06-11 RX ADMIN — HYDROMORPHONE HYDROCHLORIDE 0.5 MG: 2 INJECTION, SOLUTION INTRAMUSCULAR; INTRAVENOUS; SUBCUTANEOUS at 23:57

## 2020-06-11 RX ADMIN — FAMOTIDINE 20 MG: 10 INJECTION INTRAVENOUS at 21:03

## 2020-06-11 RX ADMIN — PRASUGREL 10 MG: 10 TABLET, FILM COATED ORAL at 08:31

## 2020-06-11 RX ADMIN — DILTIAZEM HYDROCHLORIDE 120 MG: 120 CAPSULE, COATED, EXTENDED RELEASE ORAL at 08:31

## 2020-06-11 RX ADMIN — HYDROMORPHONE HYDROCHLORIDE 0.5 MG: 2 INJECTION, SOLUTION INTRAMUSCULAR; INTRAVENOUS; SUBCUTANEOUS at 21:04

## 2020-06-11 RX ADMIN — HEPARIN SODIUM 5000 UNITS: 5000 INJECTION INTRAVENOUS; SUBCUTANEOUS at 21:03

## 2020-06-11 RX ADMIN — HYDROMORPHONE HYDROCHLORIDE 0.5 MG: 2 INJECTION, SOLUTION INTRAMUSCULAR; INTRAVENOUS; SUBCUTANEOUS at 10:58

## 2020-06-11 RX ADMIN — OXYCODONE HYDROCHLORIDE 10 MG: 5 TABLET ORAL at 19:29

## 2020-06-11 RX ADMIN — FAMOTIDINE 20 MG: 10 INJECTION INTRAVENOUS at 08:32

## 2020-06-11 RX ADMIN — Medication 10 ML: at 21:03

## 2020-06-11 RX ADMIN — HYDROMORPHONE HYDROCHLORIDE 0.5 MG: 2 INJECTION, SOLUTION INTRAMUSCULAR; INTRAVENOUS; SUBCUTANEOUS at 04:13

## 2020-06-11 RX ADMIN — HYDROMORPHONE HYDROCHLORIDE 0.5 MG: 2 INJECTION, SOLUTION INTRAMUSCULAR; INTRAVENOUS; SUBCUTANEOUS at 14:48

## 2020-06-11 RX ADMIN — ALBUTEROL SULFATE 2.5 MG: 2.5 SOLUTION RESPIRATORY (INHALATION) at 00:12

## 2020-06-11 RX ADMIN — OXYCODONE HYDROCHLORIDE 10 MG: 5 TABLET ORAL at 08:33

## 2020-06-11 RX ADMIN — ONDANSETRON 4 MG: 2 INJECTION INTRAMUSCULAR; INTRAVENOUS at 19:11

## 2020-06-11 RX ADMIN — HEPARIN SODIUM 5000 UNITS: 5000 INJECTION INTRAVENOUS; SUBCUTANEOUS at 08:32

## 2020-06-11 RX ADMIN — METOPROLOL TARTRATE 25 MG: 25 TABLET, FILM COATED ORAL at 21:03

## 2020-06-11 NOTE — PROGRESS NOTES
LOS: 2 days   Patient Care Team:  Yusuf Jones MD as PCP - General  Joshua Yip MD as Consulting Physician (Cardiology)    Subjective:  Better overall    Objective:   Less nausea      Review of Systems:   Review of Systems   Constitutional: Positive for appetite change.   Respiratory: Negative.    Cardiovascular: Negative.    Gastrointestinal: Positive for abdominal distention. Negative for blood in stool.   Genitourinary: Negative.    Musculoskeletal: Negative.            Vital Signs  Temp:  [97.3 °F (36.3 °C)-99 °F (37.2 °C)] 97.9 °F (36.6 °C)  Heart Rate:  [69-74] 69  Resp:  [14-18] 16  BP: (147-166)/(78-90) 147/78    Physical Exam:  Physical Exam   Constitutional: He is oriented to person, place, and time. He appears well-developed.   HENT:   Head: Normocephalic and atraumatic.   Cardiovascular: Regular rhythm.   Pulmonary/Chest: Effort normal.   Abdominal: Soft. He exhibits distension.   Musculoskeletal: Normal range of motion.   Neurological: He is alert and oriented to person, place, and time.   Skin: Skin is warm.        Radiology:  Ct Abdomen Pelvis With Contrast    Result Date: 6/8/2020  High-grade small bowel obstruction evidence of previous bowel surgeries and suspected adhesions. There are distended loops of small bowel which are distended up to 7 cm. There are multiple areas of tethering in the right lower abdomen. There is extensive pneumatosis predominantly around loops of bowel in the right upper abdomen and suspected small amounts of free air tracking within the mesentery of the right upper abdomen.Surgical consultation is recommended. Findings were called to Olga Faria, the ordering ER provider at 10:45 PM on 06/08/2020  Electronically Signed By-Aly Euceda DO. On:6/8/2020 11:03 PM This report was finalized on [id number] by  Aly Euceda DO..         Results Review:     I reviewed the patient's new clinical results.  I reviewed the patient's new imaging  results and agree with the interpretation.    Medication Review:   Scheduled Meds:  digoxin 250 mcg Oral Daily   dilTIAZem  mg Oral Daily   famotidine 20 mg Intravenous Q12H   heparin (porcine) 5,000 Units Subcutaneous Q12H   metoprolol tartrate 25 mg Oral Q12H   prasugrel 10 mg Oral Daily     Continuous Infusions:  lactated ringers 150 mL/hr Last Rate: Stopped (06/11/20 0638)     PRN Meds:.•  albuterol  •  HYDROmorphone  •  ondansetron  •  oxyCODONE  •  promethazine  •  [COMPLETED] Insert peripheral IV **AND** sodium chloride    Labs:    CBC    Results from last 7 days   Lab Units 06/11/20  0251 06/10/20  0254 06/08/20  2140   WBC 10*3/mm3 6.10 6.20 11.50*   HEMOGLOBIN g/dL 12.4* 13.2 16.2   PLATELETS 10*3/mm3 203 221 350     BMP Results from last 7 days   Lab Units 06/11/20  0251 06/10/20  0254 06/08/20  2157 06/08/20  2140   SODIUM mmol/L 138 137  --  137   POTASSIUM mmol/L 4.2 3.8  --  4.5   CHLORIDE mmol/L 103 103  --  102   CO2 mmol/L 27.0 26.0  --  23.0   BUN   --  17  --  23   CREATININE mg/dL 0.94 1.09 1.50* 1.45*   GLUCOSE mg/dL 128* 130*  --  166*     Cr Clearance Estimated Creatinine Clearance: 90.9 mL/min (by C-G formula based on SCr of 0.94 mg/dL).  Coag   Results from last 7 days   Lab Units 06/08/20  2140   INR  1.06   APTT seconds 24.8     HbA1C No results found for: HGBA1C  Blood Glucose No results found for: POCGLU  Infection Results from last 7 days   Lab Units 06/08/20  2310 06/08/20  2301   BLOODCX  No growth at 2 days No growth at 2 days     CMP Results from last 7 days   Lab Units 06/11/20  0251 06/10/20  0254 06/08/20  2157 06/08/20  2140   SODIUM mmol/L 138 137  --  137   POTASSIUM mmol/L 4.2 3.8  --  4.5   CHLORIDE mmol/L 103 103  --  102   CO2 mmol/L 27.0 26.0  --  23.0   BUN   --  17  --  23   CREATININE mg/dL 0.94 1.09 1.50* 1.45*   GLUCOSE mg/dL 128* 130*  --  166*   ALBUMIN g/dL  --   --   --  4.20   BILIRUBIN mg/dL  --   --   --  0.9   ALK PHOS U/L  --   --   --  144*   AST  (SGOT) U/L  --   --   --  15   ALT (SGPT) U/L  --   --   --  9   LIPASE U/L  --   --   --  14     UA  Results from last 7 days   Lab Units 06/09/20  1628   NITRITE UA  Negative   WBC UA /HPF 6-12*   BACTERIA UA /HPF Trace*   SQUAM EPITHEL UA /HPF 0-2     Radiology(recent) No radiology results for the last day   Assessment:    Acute high-grade small bowel obstruction due to adhesions (CMS/HCC)  Kidney stones  Ascites  History of alcoholism  COPD, panlobular with COPD  Heart failure  Liver cirrhosis  AAA  ACD  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

## 2020-06-11 NOTE — PLAN OF CARE
Problem: Patient Care Overview  Goal: Plan of Care Review  Outcome: Ongoing (interventions implemented as appropriate)  Flowsheets (Taken 6/11/2020 1811)  Outcome Summary: no complaints this shift other than pain. PRNS administered

## 2020-06-11 NOTE — PROGRESS NOTES
LOS: 2 days   Patient Care Team:  Yusuf Jones MD as PCP - General  Joshua Yip MD as Consulting Physician (Cardiology)    Chief Complaint: SBO  Interval History:   Patient Denies: Vomiting  History taken from: Patient= had flatus, not eating much due to not liking the full liquid menu      Objective     Vital Signs  Temp:  [97.3 °F (36.3 °C)-99 °F (37.2 °C)] 97.9 °F (36.6 °C)  Heart Rate:  [69-74] 69  Resp:  [14-18] 16  BP: (147-166)/(78-90) 147/78      Physical Exam:   Heart: RR   Lungs:  CTA B   Abd: ntnd   Ext:  No clubbing, cyanosis, edema     Results Review:     I reviewed the patient's new clinical results.    Lab Results (last 24 hours)     Procedure Component Value Units Date/Time    Basic Metabolic Panel [[id number]]  (Abnormal) Collected:  06/11/20 0251    Specimen:  Blood Updated:  06/11/20 0541     Glucose 128 mg/dL      BUN --     Comment: Testing performed by alternate method        Creatinine 0.94 mg/dL      Sodium 138 mmol/L      Potassium 4.2 mmol/L      Chloride 103 mmol/L      CO2 27.0 mmol/L      Calcium 8.7 mg/dL      eGFR Non African Amer 79 mL/min/1.73      BUN/Creatinine Ratio --     Comment: Testing not performed.        Anion Gap 8.0 mmol/L     Narrative:       GFR Normal >60  Chronic Kidney Disease <60  Kidney Failure <15      BUN [[id number]] Collected:  06/11/20 0251    Specimen:  Blood Updated:  06/11/20 0428    CBC (No Diff) [[id number]]  (Abnormal) Collected:  06/11/20 0251    Specimen:  Blood Updated:  06/11/20 0409     WBC 6.10 10*3/mm3      RBC 3.88 10*6/mm3      Hemoglobin 12.4 g/dL      Hematocrit 35.3 %      MCV 90.9 fL      MCH 32.1 pg      MCHC 35.3 g/dL      RDW 15.3 %      RDW-SD 48.6 fl      MPV 8.5 fL      Platelets 203 10*3/mm3     Blood Culture - Blood, Arm, Left [[id number]] Collected:  06/08/20 2310    Specimen:  Blood from Arm, Left Updated:  06/10/20 2331     Blood Culture No growth at 2 days    Blood Culture - Blood, Arm, Left [[id number]] Collected:   06/08/20 2301    Specimen:  Blood from Arm, Left Updated:  06/10/20 2331     Blood Culture No growth at 2 days          Assessment/Plan:    Generalized abdominal pain      74 y.o. male HD 3 SBO, having flatus, will advance to low residue diet. Labs improved.       Cleopatra Wang MD  General Surgeon  06/11/20

## 2020-06-12 VITALS
WEIGHT: 249.78 LBS | RESPIRATION RATE: 20 BRPM | SYSTOLIC BLOOD PRESSURE: 164 MMHG | HEART RATE: 70 BPM | DIASTOLIC BLOOD PRESSURE: 75 MMHG | BODY MASS INDEX: 33.83 KG/M2 | HEIGHT: 72 IN | TEMPERATURE: 97.7 F | OXYGEN SATURATION: 94 %

## 2020-06-12 PROBLEM — K56.609 SMALL BOWEL OBSTRUCTION: Status: ACTIVE | Noted: 2020-06-12

## 2020-06-12 LAB
ANION GAP SERPL CALCULATED.3IONS-SCNC: 10 MMOL/L (ref 5–15)
BASOPHILS # BLD AUTO: 0.1 10*3/MM3 (ref 0–0.2)
BASOPHILS NFR BLD AUTO: 0.8 % (ref 0–1.5)
BUN BLD-MCNC: 11 MG/DL (ref 8–23)
BUN BLD-MCNC: ABNORMAL MG/DL
BUN/CREAT SERPL: ABNORMAL
CALCIUM SPEC-SCNC: 8.7 MG/DL (ref 8.6–10.5)
CHLORIDE SERPL-SCNC: 101 MMOL/L (ref 98–107)
CO2 SERPL-SCNC: 25 MMOL/L (ref 22–29)
CREAT BLD-MCNC: 1.02 MG/DL (ref 0.76–1.27)
DEPRECATED RDW RBC AUTO: 48.6 FL (ref 37–54)
EOSINOPHIL # BLD AUTO: 0.3 10*3/MM3 (ref 0–0.4)
EOSINOPHIL NFR BLD AUTO: 4.5 % (ref 0.3–6.2)
ERYTHROCYTE [DISTWIDTH] IN BLOOD BY AUTOMATED COUNT: 15.4 % (ref 12.3–15.4)
GFR SERPL CREATININE-BSD FRML MDRD: 72 ML/MIN/1.73
GLUCOSE BLD-MCNC: 138 MG/DL (ref 65–99)
HCT VFR BLD AUTO: 35.4 % (ref 37.5–51)
HGB BLD-MCNC: 12.5 G/DL (ref 13–17.7)
LYMPHOCYTES # BLD AUTO: 0.8 10*3/MM3 (ref 0.7–3.1)
LYMPHOCYTES NFR BLD AUTO: 12.4 % (ref 19.6–45.3)
MCH RBC QN AUTO: 31.7 PG (ref 26.6–33)
MCHC RBC AUTO-ENTMCNC: 35.5 G/DL (ref 31.5–35.7)
MCV RBC AUTO: 89.3 FL (ref 79–97)
MONOCYTES # BLD AUTO: 0.6 10*3/MM3 (ref 0.1–0.9)
MONOCYTES NFR BLD AUTO: 10.2 % (ref 5–12)
NEUTROPHILS # BLD AUTO: 4.4 10*3/MM3 (ref 1.7–7)
NEUTROPHILS NFR BLD AUTO: 72.1 % (ref 42.7–76)
NRBC BLD AUTO-RTO: 0.1 /100 WBC (ref 0–0.2)
PLATELET # BLD AUTO: 208 10*3/MM3 (ref 140–450)
PMV BLD AUTO: 8.2 FL (ref 6–12)
POTASSIUM BLD-SCNC: 3.6 MMOL/L (ref 3.5–5.2)
RBC # BLD AUTO: 3.96 10*6/MM3 (ref 4.14–5.8)
SODIUM BLD-SCNC: 136 MMOL/L (ref 136–145)
WBC NRBC COR # BLD: 6.1 10*3/MM3 (ref 3.4–10.8)

## 2020-06-12 PROCEDURE — 25010000002 METHYLPREDNISOLONE PER 40 MG: Performed by: FAMILY MEDICINE

## 2020-06-12 PROCEDURE — 94799 UNLISTED PULMONARY SVC/PX: CPT

## 2020-06-12 PROCEDURE — 25010000002 HYDROMORPHONE PER 4 MG: Performed by: FAMILY MEDICINE

## 2020-06-12 PROCEDURE — 25010000002 HEPARIN (PORCINE) PER 1000 UNITS: Performed by: FAMILY MEDICINE

## 2020-06-12 PROCEDURE — 85025 COMPLETE CBC W/AUTO DIFF WBC: CPT | Performed by: FAMILY MEDICINE

## 2020-06-12 PROCEDURE — 80048 BASIC METABOLIC PNL TOTAL CA: CPT | Performed by: FAMILY MEDICINE

## 2020-06-12 PROCEDURE — 25010000002 CEFTRIAXONE PER 250 MG: Performed by: FAMILY MEDICINE

## 2020-06-12 RX ORDER — METHYLPREDNISOLONE ACETATE 40 MG/ML
40 INJECTION, SUSPENSION INTRA-ARTICULAR; INTRALESIONAL; INTRAMUSCULAR; SOFT TISSUE ONCE
Status: COMPLETED | OUTPATIENT
Start: 2020-06-12 | End: 2020-06-12

## 2020-06-12 RX ADMIN — CEFTRIAXONE SODIUM 1 G: 1 INJECTION, POWDER, FOR SOLUTION INTRAMUSCULAR; INTRAVENOUS at 08:46

## 2020-06-12 RX ADMIN — OXYCODONE HYDROCHLORIDE 10 MG: 5 TABLET ORAL at 01:34

## 2020-06-12 RX ADMIN — Medication 10 ML: at 08:46

## 2020-06-12 RX ADMIN — DILTIAZEM HYDROCHLORIDE 120 MG: 120 CAPSULE, COATED, EXTENDED RELEASE ORAL at 08:46

## 2020-06-12 RX ADMIN — FAMOTIDINE 20 MG: 10 INJECTION INTRAVENOUS at 08:46

## 2020-06-12 RX ADMIN — OXYCODONE HYDROCHLORIDE 10 MG: 5 TABLET ORAL at 08:47

## 2020-06-12 RX ADMIN — ALBUTEROL SULFATE 2.5 MG: 2.5 SOLUTION RESPIRATORY (INHALATION) at 01:19

## 2020-06-12 RX ADMIN — HYDROMORPHONE HYDROCHLORIDE 0.5 MG: 2 INJECTION, SOLUTION INTRAMUSCULAR; INTRAVENOUS; SUBCUTANEOUS at 06:02

## 2020-06-12 RX ADMIN — HEPARIN SODIUM 5000 UNITS: 5000 INJECTION INTRAVENOUS; SUBCUTANEOUS at 08:46

## 2020-06-12 RX ADMIN — PRASUGREL 10 MG: 10 TABLET, FILM COATED ORAL at 08:46

## 2020-06-12 RX ADMIN — HYDROMORPHONE HYDROCHLORIDE 0.5 MG: 2 INJECTION, SOLUTION INTRAMUSCULAR; INTRAVENOUS; SUBCUTANEOUS at 03:01

## 2020-06-12 RX ADMIN — METHYLPREDNISOLONE ACETATE 40 MG: 40 INJECTION, SUSPENSION INTRA-ARTICULAR; INTRALESIONAL; INTRAMUSCULAR; SOFT TISSUE at 10:30

## 2020-06-12 RX ADMIN — METOPROLOL TARTRATE 25 MG: 25 TABLET, FILM COATED ORAL at 08:46

## 2020-06-12 NOTE — DISCHARGE SUMMARY
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          Hospital Course  Patient is a 74 y.o. male presented with acute abdominal pain with evidence of an acute small bowel obstruction.  He underwent bowel rest as well as parenteral pain control medications and nasogastric decompression.  The patient did improve overall.  General surgery was consulted and did follow along throughout the patient's hospital stay.  He spontaneously improved and did have return of normal gut function and was deemed stable for discharge home today with medications per the discharge reconciliation.  He did develop an upper respiratory tract infection with an acute reactive bronchospasm and was treated with parenteral antimicrobial therapy and Depot steroids.  He will follow-up with us in the office within 1 week    Procedures Performed         Consults:   Consults     Date and Time Order Name Status Description    6/9/2020 0852 Inpatient General Surgery Consult Completed     6/8/2020 2254 Family Medicine Consult Completed     6/8/2020 2258 Surgery (on-call MD unless specified) Completed           Pertinent Test Results:Ct Abdomen Pelvis With Contrast    Result Date: 6/8/2020  High-grade small bowel obstruction evidence of previous bowel surgeries and  suspected adhesions. There are distended loops of small bowel which are distended up to 7 cm. There are multiple areas of tethering in the right lower abdomen. There is extensive pneumatosis predominantly around loops of bowel in the right upper abdomen and suspected small amounts of free air tracking within the mesentery of the right upper abdomen.Surgical consultation is recommended. Findings were called to Olga Faria, the ordering ER provider at 10:45 PM on 06/08/2020  Electronically Signed By-Aly Euceda DO. On:6/8/2020 11:03 PM This report was finalized on [id number] by  Aly Euceda DO..      Imaging Results (Last 7 Days)     Procedure Component Value Units Date/Time    CT Abdomen Pelvis With Contrast [[id number]] Collected:  06/08/20 2240     Updated:  06/08/20 2305    Narrative:          DATE OF EXAM:  6/8/2020 10:08 PM     PROCEDURE:  CT ABDOMEN PELVIS W CONTRAST-     INDICATIONS:   abd pain, hx sbo     COMPARISON:   CT of the abdomen and pelvis with contrast dated 03/17/2020     TECHNIQUE:  Routine transaxial slices were obtained through the abdomen and pelvis  after the intravenous administration of 100 mL of Isovue 370.  Reconstructed coronal and sagittal images were also obtained. Automated  exposure control and iterative construction methods were used.     FINDINGS:  The lung bases are free of active disease.   The liver, spleen, pancreas, and adrenal glands are unremarkable. The  gallbladder is surgically absent. There is no biliary dilatation. There  is cortical thinning of the kidneys bilaterally and staghorn calculus in  lower pole the left kidney. No hydroureteronephrosis..The bladder is  unremarkable.  Radiation seeds are present in the prostate gland      There is a high-grade small bowel obstruction with abrupt transition  zone in the mid to distal ileum. The bowel loops proximal to this are  moderately to severely distended and fluid-filled. There is extensive  pneumatosis  around distended loops of small bowel in the right upper  abdomen similar in distribution to the study from 03/15/2020 and  suspected severe a small amount of free air within the mesentery of the  same area.  . There is a apparent suture line several distended loops of small bowel  and there is tethering within the mesentery. There is adherent loops of  small bowel to the anterior abdominal wall. Fascial defects along the  right lower abdominal wall are present best seen on coronal image 49 and  coronal image 31 and coronal image 47. Most of the pneumatosis is in the  anterior right upper abdomen.  . No pathologically enlarged lymph nodes. No loculated fluid  collections.. The abdominal aorta is normal in course and caliber with a  moderate degree of vascular calcification.. No acute bony abnormality or  suspicious focal osseous lesion.          Impression:       High-grade small bowel obstruction evidence of previous bowel surgeries  and suspected adhesions. There are distended loops of small bowel which  are distended up to 7 cm. There are multiple areas of tethering in the  right lower abdomen. There is extensive pneumatosis predominantly around  loops of bowel in the right upper abdomen and suspected small amounts of  free air tracking within the mesentery of the right upper  abdomen.Surgical consultation is recommended. Findings were called to  [facility], the ordering ER provider at 10:45 PM on  06/08/2020     Electronically Signed By-Aly Euceda DO. On:6/8/2020 11:03 PM  This report was finalized on [id number] by  Aly Euceda DO..              Condition on Discharge:  Fair    Vital Signs  Temp:  [97.5 °F (36.4 °C)-97.9 °F (36.6 °C)] 97.7 °F (36.5 °C)  Heart Rate:  [68-76] 70  Resp:  [16-20] 20  BP: (155-167)/(75-84) 164/75    Physical Exam:     General Appearance:    Alert, cooperative, in no acute distress   Head:    Normocephalic, without obvious abnormality, atraumatic   Eyes:            Conjunctivae and sclerae normal, no   icterus, no pallor, corneas clear, PERRLA   Throat:   No oral lesions, no thrush, oral mucosa moist   Neck:   No adenopathy, supple, trachea midline, no thyromegaly, no   carotid bruit, no JVD   Lungs:     Clear to auscultation,respirations regular, even and                  unlabored    Heart:    Regular rhythm and normal rate, normal S1 and S2, no            murmur, no gallop, no rub, no click   Chest Wall:    No abnormalities observed   Abdomen:     Normal bowel sounds, no masses, no organomegaly, soft        non-tender, non-distended, no guarding, no rebound                tenderness   Rectal:     Deferred   Extremities:   Moves all extremities well, no edema, no cyanosis, no             redness   Pulses:   Pulses palpable and equal bilaterally   Skin:   No bleeding, bruising or rash   Lymph nodes:   No palpable adenopathy   Neurologic:   Cranial nerves 2 - 12 grossly intact, sensation intact, DTR       present and equal bilaterally         Discharge Disposition  Home or Self Care    Discharge Medications     Discharge Medications      Continue These Medications      Instructions Start Date   aspirin 81 MG EC tablet   81 mg, Oral, Daily      atorvastatin 40 MG tablet  Commonly known as:  LIPITOR   40 mg, Oral, Nightly      digoxin 250 MCG tablet  Commonly known as:  LANOXIN   250 mcg, Oral, Daily Digoxin      dilTIAZem  MG 24 hr capsule  Commonly known as:  CARDIZEM CD   120 mg, Oral, Daily      furosemide 40 MG tablet  Commonly known as:  LASIX   40 mg, Oral, Daily      losartan 100 MG tablet  Commonly known as:  COZAAR   100 mg, Oral, Daily      Lyrica 150 MG capsule  Generic drug:  pregabalin   150 mg, Oral, 2 Times Daily      metoprolol tartrate 25 MG tablet  Commonly known as:  LOPRESSOR   25 mg, Oral, Every 12 Hours Scheduled      mirtazapine 30 MG tablet  Commonly known as:  REMERON   30 mg, Oral, Nightly      oxyCODONE-acetaminophen  MG per  tablet  Commonly known as:  PERCOCET   1 tablet, Oral, Every 6 Hours PRN      potassium chloride 10 MEQ CR tablet  Commonly known as:  K-DUR   10 mEq, Oral, 2 Times Daily      prasugrel 10 MG tablet  Commonly known as:  EFFIENT   10 mg, Oral, Daily      traZODone 100 MG tablet  Commonly known as:  DESYREL   100 mg, Oral, Nightly      Ventolin  (90 Base) MCG/ACT inhaler  Generic drug:  albuterol sulfate HFA   2 puffs, Inhalation, Every 6 Hours PRN             Discharge Diet:   No concentrated sweets 1800-calorie  Activity at Discharge:   As tolerated     follow-up Appointments  Future Appointments   Date Time Provider Department Center   7/30/2020  3:00 PM Herberth Oconnor MD [facility]  [facility] None   8/24/2020  1:45 PM DeamMoise MD [facility] None         Test Results Pending at Discharge   Order Current Status    Blood Culture - Blood, Arm, Left Preliminary result    Blood Culture - Blood, Arm, Left Preliminary result           Yusuf Jones MD  06/12/20  07:45

## 2020-06-12 NOTE — PLAN OF CARE
Problem: Patient Care Overview  Goal: Plan of Care Review  Flowsheets (Taken 6/12/2020 0302)  Outcome Summary: Patient requesting pain relief often. Resting comfortably inbetween. Will continue to monitor.

## 2020-06-13 ENCOUNTER — READMISSION MANAGEMENT (OUTPATIENT)
Dept: CALL CENTER | Facility: HOSPITAL | Age: 75
End: 2020-06-13

## 2020-06-13 LAB
BACTERIA SPEC AEROBE CULT: NORMAL
BACTERIA SPEC AEROBE CULT: NORMAL

## 2020-06-13 NOTE — OUTREACH NOTE
Prep Survey      Responses   [facility] facility patient discharged from?  [facility]   Is LACE score < 7 ?  No   Eligibility  Readm Mgmt   Discharge diagnosis  Acute high-grade small bowel obstruction due to adhesions    COVID-19 Test Status  Not tested   Does the patient have one of the following disease processes/diagnoses(primary or secondary)?  Other   Does the patient have Home health ordered?  No   Is there a DME ordered?  No   Prep survey completed?  Yes          Iqra Bishop RN

## 2020-06-15 NOTE — PROGRESS NOTES
Discharge Planning Assessment   [facility]     Patient Name: Ro Nathan  MRN: [id number]  Today's Date: 6/15/2020    Admit Date: 6/8/2020          Plan    Final Discharge Disposition Code  01 - home or self-care    Final Note  return home       Carol naegele rn  Case management  Office number [phone]  Cell phone [phone]

## 2020-06-16 ENCOUNTER — READMISSION MANAGEMENT (OUTPATIENT)
Dept: CALL CENTER | Facility: HOSPITAL | Age: 75
End: 2020-06-16

## 2020-06-16 NOTE — OUTREACH NOTE
Medical Week 1 Survey      Responses   [facility] patient discharged from?  [facility]   COVID-19 Test Status  Not tested   Does the patient have one of the following disease processes/diagnoses(primary or secondary)?  Other   Is there a successful TCM telephone encounter documented?  No   Week 1 attempt successful?  No   Unsuccessful attempts  Attempt 1          Lali Sahu LPN

## 2020-06-17 ENCOUNTER — READMISSION MANAGEMENT (OUTPATIENT)
Dept: CALL CENTER | Facility: HOSPITAL | Age: 75
End: 2020-06-17

## 2020-06-17 NOTE — OUTREACH NOTE
Medical Week 1 Survey      Responses   [facility] patient discharged from?  [facility]   COVID-19 Test Status  Not tested   Does the patient have one of the following disease processes/diagnoses(primary or secondary)?  Other   Is there a successful TCM telephone encounter documented?  No   Week 1 attempt successful?  Yes   Call start time  1928   Call end time  1929   Discharge diagnosis  Acute high-grade small bowel obstruction due to adhesions    Does the patient have all medications ordered at discharge?  N/A   Is the patient taking all medications as directed (includes completed medication regime)?  Yes   Does the patient have a primary care provider?   Yes   Does the patient have an appointment with their PCP within 7 days of discharge?  Yes   Comments regarding PCP  Has appt Wed   Has the patient kept scheduled appointments due by today?  Yes   Has home health visited the patient within 72 hours of discharge?  N/A   Pulse Ox monitoring  None   Did the patient receive a copy of their discharge instructions?  Yes   Nursing interventions  Reviewed instructions with patient   What is the patient's perception of their health status since discharge?  Improving   Is the patient/caregiver able to teach back signs and symptoms related to disease process for when to call PCP?  Yes   Is the patient/caregiver able to teach back signs and symptoms related to disease process for when to call 911?  Yes   Is the patient/caregiver able to teach back the hierarchy of who to call/visit for symptoms/problems? PCP, Specialist, Home health nurse, Urgent Care, ED, 911  Yes   Additional teach back comments  States he is doing well and has his appt Wed to see his PCP.   Week 1 call completed?  Yes   Wrap up additional comments  Denies questions or needs           Lali Sahu LPN

## 2020-06-23 ENCOUNTER — READMISSION MANAGEMENT (OUTPATIENT)
Dept: CALL CENTER | Facility: HOSPITAL | Age: 75
End: 2020-06-23

## 2020-06-23 NOTE — OUTREACH NOTE
Medical Week 2 Survey      Responses   [facility] patient discharged from?  [facility]   COVID-19 Test Status  Not tested   Does the patient have one of the following disease processes/diagnoses(primary or secondary)?  Other   Week 2 attempt successful?  No   Unsuccessful attempts  Attempt 1          Kayleigh Back LPN

## 2020-06-24 ENCOUNTER — READMISSION MANAGEMENT (OUTPATIENT)
Dept: CALL CENTER | Facility: HOSPITAL | Age: 75
End: 2020-06-24

## 2020-06-24 NOTE — OUTREACH NOTE
Medical Week 2 Survey      Responses   [facility] patient discharged from?  [facility]   COVID-19 Test Status  Not tested   Does the patient have one of the following disease processes/diagnoses(primary or secondary)?  Other   Week 2 attempt successful?  No   Unsuccessful attempts  Attempt 2          Kayleigh Back LPN

## 2020-06-30 ENCOUNTER — READMISSION MANAGEMENT (OUTPATIENT)
Dept: CALL CENTER | Facility: HOSPITAL | Age: 75
End: 2020-06-30

## 2020-06-30 ENCOUNTER — TELEPHONE (OUTPATIENT)
Dept: PAIN MEDICINE | Facility: CLINIC | Age: 75
End: 2020-06-30

## 2020-06-30 RX ORDER — OXYCODONE AND ACETAMINOPHEN 10; 325 MG/1; MG/1
1 TABLET ORAL EVERY 6 HOURS PRN
Qty: 120 TABLET | Refills: 0 | Status: SHIPPED | OUTPATIENT
Start: 2020-06-30 | End: 2020-07-30 | Stop reason: SDUPTHER

## 2020-06-30 NOTE — OUTREACH NOTE
Medical Week 3 Survey      Responses   [facility] patient discharged from?  [facility]   COVID-19 Test Status  Not tested   Does the patient have one of the following disease processes/diagnoses(primary or secondary)?  Other   Week 3 attempt successful?  No   Unsuccessful attempts  Attempt 1          Kayleigh Back LPN

## 2020-07-02 ENCOUNTER — READMISSION MANAGEMENT (OUTPATIENT)
Dept: CALL CENTER | Facility: HOSPITAL | Age: 75
End: 2020-07-02

## 2020-07-02 NOTE — OUTREACH NOTE
Medical Week 3 Survey      Responses   [facility] patient discharged from?  [facility]   COVID-19 Test Status  Not tested   Does the patient have one of the following disease processes/diagnoses(primary or secondary)?  Other   Week 3 attempt successful?  No   Unsuccessful attempts  Attempt 2          Kayleigh Back LPN

## 2020-07-30 ENCOUNTER — OFFICE VISIT (OUTPATIENT)
Dept: PAIN MEDICINE | Facility: CLINIC | Age: 75
End: 2020-07-30

## 2020-07-30 ENCOUNTER — RESULTS ENCOUNTER (OUTPATIENT)
Dept: PAIN MEDICINE | Facility: CLINIC | Age: 75
End: 2020-07-30

## 2020-07-30 VITALS
OXYGEN SATURATION: 97 % | HEIGHT: 73 IN | SYSTOLIC BLOOD PRESSURE: 165 MMHG | DIASTOLIC BLOOD PRESSURE: 89 MMHG | RESPIRATION RATE: 16 BRPM | BODY MASS INDEX: 33.13 KG/M2 | TEMPERATURE: 97.7 F | WEIGHT: 250 LBS | HEART RATE: 80 BPM

## 2020-07-30 DIAGNOSIS — F19.90 CURRENT DRUG USE: ICD-10-CM

## 2020-07-30 DIAGNOSIS — F19.90 CURRENT DRUG USE: Primary | ICD-10-CM

## 2020-07-30 DIAGNOSIS — G89.29 CHRONIC MIDLINE LOW BACK PAIN WITHOUT SCIATICA: ICD-10-CM

## 2020-07-30 DIAGNOSIS — M47.817 SPONDYLOSIS OF LUMBOSACRAL REGION WITHOUT MYELOPATHY OR RADICULOPATHY: ICD-10-CM

## 2020-07-30 DIAGNOSIS — M54.50 CHRONIC MIDLINE LOW BACK PAIN WITHOUT SCIATICA: ICD-10-CM

## 2020-07-30 DIAGNOSIS — M54.16 LUMBAR RADICULOPATHY: ICD-10-CM

## 2020-07-30 DIAGNOSIS — M51.36 DDD (DEGENERATIVE DISC DISEASE), LUMBAR: ICD-10-CM

## 2020-07-30 DIAGNOSIS — M54.59 LUMBAR FACET JOINT PAIN: ICD-10-CM

## 2020-07-30 PROCEDURE — 99213 OFFICE O/P EST LOW 20 MIN: CPT | Performed by: PHYSICAL MEDICINE & REHABILITATION

## 2020-07-30 RX ORDER — OXYCODONE AND ACETAMINOPHEN 10; 325 MG/1; MG/1
1 TABLET ORAL EVERY 6 HOURS PRN
Qty: 120 TABLET | Refills: 0 | Status: SHIPPED | OUTPATIENT
Start: 2020-07-30 | End: 2020-07-30 | Stop reason: SDUPTHER

## 2020-07-30 RX ORDER — OXYCODONE AND ACETAMINOPHEN 10; 325 MG/1; MG/1
1 TABLET ORAL EVERY 6 HOURS PRN
Qty: 120 TABLET | Refills: 0 | Status: SHIPPED | OUTPATIENT
Start: 2020-07-30 | End: 2020-10-23 | Stop reason: SDUPTHER
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
## 2020-08-04 ENCOUNTER — APPOINTMENT (OUTPATIENT)
Dept: CT IMAGING | Facility: HOSPITAL | Age: 75
End: 2020-08-04

## 2020-08-04 ENCOUNTER — HOSPITAL ENCOUNTER (INPATIENT)
Facility: HOSPITAL | Age: 75
LOS: 4 days | Discharge: HOME | End: 2020-08-08
Attending: EMERGENCY MEDICINE | Admitting: FAMILY MEDICINE

## 2020-08-04 DIAGNOSIS — K56.609 SBO (SMALL BOWEL OBSTRUCTION) (HCC): Primary | ICD-10-CM

## 2020-08-04 LAB
ALBUMIN SERPL-MCNC: 3.6 G/DL (ref 3.5–5.2)
ALBUMIN/GLOB SERPL: 1.2 G/DL
ALP SERPL-CCNC: 143 U/L (ref 39–117)
ALT SERPL W P-5'-P-CCNC: 8 U/L (ref 1–41)
ANION GAP SERPL CALCULATED.3IONS-SCNC: 14 MMOL/L (ref 5–15)
APTT PPP: 25 SECONDS (ref 24–31)
AST SERPL-CCNC: 15 U/L (ref 1–40)
BASOPHILS # BLD AUTO: 0.1 10*3/MM3 (ref 0–0.2)
BASOPHILS NFR BLD AUTO: 0.7 % (ref 0–1.5)
BILIRUB SERPL-MCNC: 0.9 MG/DL (ref 0–1.2)
BILIRUB UR QL STRIP: NEGATIVE
BUN SERPL-MCNC: 16 MG/DL (ref 8–23)
BUN SERPL-MCNC: ABNORMAL MG/DL
BUN/CREAT SERPL: ABNORMAL
CALCIUM SPEC-SCNC: 9.1 MG/DL (ref 8.6–10.5)
CHLORIDE SERPL-SCNC: 99 MMOL/L (ref 98–107)
CLARITY UR: CLEAR
CO2 SERPL-SCNC: 24 MMOL/L (ref 22–29)
COLOR UR: YELLOW
CREAT SERPL-MCNC: 1.17 MG/DL (ref 0.76–1.27)
DEPRECATED RDW RBC AUTO: 49 FL (ref 37–54)
EOSINOPHIL # BLD AUTO: 0.3 10*3/MM3 (ref 0–0.4)
EOSINOPHIL NFR BLD AUTO: 2.8 % (ref 0.3–6.2)
ERYTHROCYTE [DISTWIDTH] IN BLOOD BY AUTOMATED COUNT: 15.8 % (ref 12.3–15.4)
GFR SERPL CREATININE-BSD FRML MDRD: 61 ML/MIN/1.73
GLOBULIN UR ELPH-MCNC: 2.9 GM/DL
GLUCOSE BLDC GLUCOMTR-MCNC: 123 MG/DL (ref 70–105)
GLUCOSE BLDC GLUCOMTR-MCNC: 130 MG/DL (ref 70–105)
GLUCOSE BLDC GLUCOMTR-MCNC: 139 MG/DL (ref 70–105)
GLUCOSE SERPL-MCNC: 176 MG/DL (ref 65–99)
GLUCOSE UR STRIP-MCNC: NEGATIVE MG/DL
HBA1C MFR BLD: 5.6 % (ref 3.5–5.6)
HCT VFR BLD AUTO: 42.7 % (ref 37.5–51)
HGB BLD-MCNC: 14.8 G/DL (ref 13–17.7)
HGB UR QL STRIP.AUTO: NEGATIVE
INR PPP: 1.03 (ref 0.9–1.1)
KETONES UR QL STRIP: NEGATIVE
LEUKOCYTE ESTERASE UR QL STRIP.AUTO: NEGATIVE
LIPASE SERPL-CCNC: 13 U/L (ref 13–60)
LYMPHOCYTES # BLD AUTO: 0.6 10*3/MM3 (ref 0.7–3.1)
LYMPHOCYTES NFR BLD AUTO: 6.8 % (ref 19.6–45.3)
MAGNESIUM SERPL-MCNC: 1.6 MG/DL (ref 1.6–2.4)
MCH RBC QN AUTO: 31 PG (ref 26.6–33)
MCHC RBC AUTO-ENTMCNC: 34.8 G/DL (ref 31.5–35.7)
MCV RBC AUTO: 89.1 FL (ref 79–97)
MONOCYTES # BLD AUTO: 0.6 10*3/MM3 (ref 0.1–0.9)
MONOCYTES NFR BLD AUTO: 5.7 % (ref 5–12)
NEUTROPHILS NFR BLD AUTO: 8.1 10*3/MM3 (ref 1.7–7)
NEUTROPHILS NFR BLD AUTO: 84 % (ref 42.7–76)
NITRITE UR QL STRIP: NEGATIVE
NRBC BLD AUTO-RTO: 0.1 /100 WBC (ref 0–0.2)
PH UR STRIP.AUTO: 5.5 [PH] (ref 5–8)
PHOSPHATE SERPL-MCNC: 3.9 MG/DL (ref 2.5–4.5)
PLATELET # BLD AUTO: 331 10*3/MM3 (ref 140–450)
PMV BLD AUTO: 8.2 FL (ref 6–12)
POTASSIUM SERPL-SCNC: 3.9 MMOL/L (ref 3.5–5.2)
PROT SERPL-MCNC: 6.5 G/DL (ref 6–8.5)
PROT UR QL STRIP: NEGATIVE
PROTHROMBIN TIME: 10.7 SECONDS (ref 9.6–11.7)
RBC # BLD AUTO: 4.79 10*6/MM3 (ref 4.14–5.8)
SODIUM SERPL-SCNC: 137 MMOL/L (ref 136–145)
SP GR UR STRIP: 1.05 (ref 1–1.03)
UROBILINOGEN UR QL STRIP: ABNORMAL
WBC # BLD AUTO: 9.6 10*3/MM3 (ref 3.4–10.8)

## 2020-08-04 PROCEDURE — 80053 COMPREHEN METABOLIC PANEL: CPT | Performed by: EMERGENCY MEDICINE

## 2020-08-04 PROCEDURE — 81003 URINALYSIS AUTO W/O SCOPE: CPT | Performed by: FAMILY MEDICINE

## 2020-08-04 PROCEDURE — 25010000002 ONDANSETRON PER 1 MG: Performed by: FAMILY MEDICINE

## 2020-08-04 PROCEDURE — 25010000002 ONDANSETRON PER 1 MG

## 2020-08-04 PROCEDURE — 85730 THROMBOPLASTIN TIME PARTIAL: CPT | Performed by: EMERGENCY MEDICINE

## 2020-08-04 PROCEDURE — 99285 EMERGENCY DEPT VISIT HI MDM: CPT

## 2020-08-04 PROCEDURE — 85610 PROTHROMBIN TIME: CPT | Performed by: EMERGENCY MEDICINE

## 2020-08-04 PROCEDURE — 83036 HEMOGLOBIN GLYCOSYLATED A1C: CPT | Performed by: FAMILY MEDICINE

## 2020-08-04 PROCEDURE — 85025 COMPLETE CBC W/AUTO DIFF WBC: CPT | Performed by: EMERGENCY MEDICINE

## 2020-08-04 PROCEDURE — 0 IOPAMIDOL PER 1 ML: Performed by: EMERGENCY MEDICINE

## 2020-08-04 PROCEDURE — 99221 1ST HOSP IP/OBS SF/LOW 40: CPT | Performed by: SURGERY

## 2020-08-04 PROCEDURE — 84100 ASSAY OF PHOSPHORUS: CPT | Performed by: FAMILY MEDICINE

## 2020-08-04 PROCEDURE — 25010000002 HYDROMORPHONE PER 4 MG: Performed by: FAMILY MEDICINE

## 2020-08-04 PROCEDURE — 25010000002 ENOXAPARIN PER 10 MG: Performed by: FAMILY MEDICINE

## 2020-08-04 PROCEDURE — 25010000002 PIPERACILLIN SOD-TAZOBACTAM PER 1 G: Performed by: FAMILY MEDICINE

## 2020-08-04 PROCEDURE — 25010000002 HYDROMORPHONE PER 4 MG: Performed by: EMERGENCY MEDICINE

## 2020-08-04 PROCEDURE — 25010000002 ONDANSETRON PER 1 MG: Performed by: EMERGENCY MEDICINE

## 2020-08-04 PROCEDURE — 82962 GLUCOSE BLOOD TEST: CPT

## 2020-08-04 PROCEDURE — 74177 CT ABD & PELVIS W/CONTRAST: CPT

## 2020-08-04 PROCEDURE — 83735 ASSAY OF MAGNESIUM: CPT | Performed by: FAMILY MEDICINE

## 2020-08-04 PROCEDURE — 83690 ASSAY OF LIPASE: CPT | Performed by: EMERGENCY MEDICINE

## 2020-08-04 PROCEDURE — 93005 ELECTROCARDIOGRAM TRACING: CPT | Performed by: NURSE PRACTITIONER

## 2020-08-04 PROCEDURE — 99222 1ST HOSP IP/OBS MODERATE 55: CPT | Performed by: INTERNAL MEDICINE

## 2020-08-04 PROCEDURE — 25010000003 MAGNESIUM SULFATE 4 GM/100ML SOLUTION: Performed by: FAMILY MEDICINE

## 2020-08-04 RX ORDER — ONDANSETRON 4 MG/1
4 TABLET, FILM COATED ORAL EVERY 6 HOURS PRN
Status: DISCONTINUED | OUTPATIENT
Start: 2020-08-04 | End: 2020-08-08 | Stop reason: HOSPADM

## 2020-08-04 RX ORDER — ONDANSETRON 2 MG/ML
4 INJECTION INTRAMUSCULAR; INTRAVENOUS ONCE
Status: COMPLETED | OUTPATIENT
Start: 2020-08-04 | End: 2020-08-04

## 2020-08-04 RX ORDER — MAGNESIUM SULFATE HEPTAHYDRATE 40 MG/ML
4 INJECTION, SOLUTION INTRAVENOUS AS NEEDED
Status: DISCONTINUED | OUTPATIENT
Start: 2020-08-04 | End: 2020-08-08 | Stop reason: HOSPADM

## 2020-08-04 RX ORDER — MAGNESIUM SULFATE HEPTAHYDRATE 40 MG/ML
2 INJECTION, SOLUTION INTRAVENOUS AS NEEDED
Status: DISCONTINUED | OUTPATIENT
Start: 2020-08-04 | End: 2020-08-08 | Stop reason: HOSPADM

## 2020-08-04 RX ORDER — ONDANSETRON 2 MG/ML
INJECTION INTRAMUSCULAR; INTRAVENOUS
Status: COMPLETED
Start: 2020-08-04 | End: 2020-08-04

## 2020-08-04 RX ORDER — SODIUM CHLORIDE 0.9 % (FLUSH) 0.9 %
10 SYRINGE (ML) INJECTION EVERY 12 HOURS SCHEDULED
Status: DISCONTINUED | OUTPATIENT
Start: 2020-08-04 | End: 2020-08-08 | Stop reason: HOSPADM

## 2020-08-04 RX ORDER — HYDROMORPHONE HCL 110MG/55ML
1 PATIENT CONTROLLED ANALGESIA SYRINGE INTRAVENOUS
Status: DISCONTINUED | OUTPATIENT
Start: 2020-08-04 | End: 2020-08-05

## 2020-08-04 RX ORDER — MAGNESIUM SULFATE HEPTAHYDRATE 40 MG/ML
2 INJECTION, SOLUTION INTRAVENOUS ONCE
Status: DISCONTINUED | OUTPATIENT
Start: 2020-08-04 | End: 2020-08-08 | Stop reason: HOSPADM

## 2020-08-04 RX ORDER — ONDANSETRON 2 MG/ML
4 INJECTION INTRAMUSCULAR; INTRAVENOUS EVERY 6 HOURS PRN
Status: DISCONTINUED | OUTPATIENT
Start: 2020-08-04 | End: 2020-08-08 | Stop reason: HOSPADM

## 2020-08-04 RX ORDER — SODIUM CHLORIDE, SODIUM LACTATE, POTASSIUM CHLORIDE, CALCIUM CHLORIDE 600; 310; 30; 20 MG/100ML; MG/100ML; MG/100ML; MG/100ML
150 INJECTION, SOLUTION INTRAVENOUS CONTINUOUS
Status: DISCONTINUED | OUTPATIENT
Start: 2020-08-04 | End: 2020-08-07

## 2020-08-04 RX ORDER — POTASSIUM CHLORIDE 7.45 MG/ML
10 INJECTION INTRAVENOUS
Status: DISCONTINUED | OUTPATIENT
Start: 2020-08-04 | End: 2020-08-08 | Stop reason: HOSPADM

## 2020-08-04 RX ORDER — HYDROMORPHONE HCL 110MG/55ML
0.5 PATIENT CONTROLLED ANALGESIA SYRINGE INTRAVENOUS ONCE
Status: COMPLETED | OUTPATIENT
Start: 2020-08-04 | End: 2020-08-04

## 2020-08-04 RX ORDER — ASPIRIN 300 MG/1
300 SUPPOSITORY RECTAL DAILY
Status: DISCONTINUED | OUTPATIENT
Start: 2020-08-04 | End: 2020-08-07

## 2020-08-04 RX ORDER — DEXTROSE MONOHYDRATE 25 G/50ML
25 INJECTION, SOLUTION INTRAVENOUS
Status: DISCONTINUED | OUTPATIENT
Start: 2020-08-04 | End: 2020-08-08 | Stop reason: HOSPADM

## 2020-08-04 RX ORDER — NICOTINE POLACRILEX 4 MG
15 LOZENGE BUCCAL
Status: DISCONTINUED | OUTPATIENT
Start: 2020-08-04 | End: 2020-08-08 | Stop reason: HOSPADM

## 2020-08-04 RX ORDER — METOPROLOL TARTRATE 5 MG/5ML
2.5 INJECTION INTRAVENOUS EVERY 6 HOURS SCHEDULED
Status: DISCONTINUED | OUTPATIENT
Start: 2020-08-04 | End: 2020-08-07

## 2020-08-04 RX ORDER — SODIUM CHLORIDE 0.9 % (FLUSH) 0.9 %
10 SYRINGE (ML) INJECTION AS NEEDED
Status: DISCONTINUED | OUTPATIENT
Start: 2020-08-04 | End: 2020-08-08 | Stop reason: HOSPADM

## 2020-08-04 RX ADMIN — SODIUM CHLORIDE, SODIUM LACTATE, POTASSIUM CHLORIDE, AND CALCIUM CHLORIDE 150 ML/HR: 600; 310; 30; 20 INJECTION, SOLUTION INTRAVENOUS at 19:09

## 2020-08-04 RX ADMIN — HYDROMORPHONE HYDROCHLORIDE 1 MG: 2 INJECTION, SOLUTION INTRAMUSCULAR; INTRAVENOUS; SUBCUTANEOUS at 20:35

## 2020-08-04 RX ADMIN — PIPERACILLIN AND TAZOBACTAM 3.38 G: 3; .375 INJECTION, POWDER, FOR SOLUTION INTRAVENOUS at 23:37

## 2020-08-04 RX ADMIN — HYDROMORPHONE HYDROCHLORIDE 1 MG: 2 INJECTION, SOLUTION INTRAMUSCULAR; INTRAVENOUS; SUBCUTANEOUS at 14:42

## 2020-08-04 RX ADMIN — HYDROMORPHONE HYDROCHLORIDE 1 MG: 2 INJECTION, SOLUTION INTRAMUSCULAR; INTRAVENOUS; SUBCUTANEOUS at 18:35

## 2020-08-04 RX ADMIN — SODIUM CHLORIDE, SODIUM LACTATE, POTASSIUM CHLORIDE, AND CALCIUM CHLORIDE 150 ML/HR: 600; 310; 30; 20 INJECTION, SOLUTION INTRAVENOUS at 08:02

## 2020-08-04 RX ADMIN — ONDANSETRON 4 MG: 2 INJECTION, SOLUTION INTRAMUSCULAR; INTRAVENOUS at 03:17

## 2020-08-04 RX ADMIN — ONDANSETRON 4 MG: 2 INJECTION INTRAMUSCULAR; INTRAVENOUS at 05:15

## 2020-08-04 RX ADMIN — IOPAMIDOL 100 ML: 755 INJECTION, SOLUTION INTRAVENOUS at 04:38

## 2020-08-04 RX ADMIN — SODIUM CHLORIDE 500 ML: 0.9 INJECTION, SOLUTION INTRAVENOUS at 03:16

## 2020-08-04 RX ADMIN — METOPROLOL TARTRATE 2.5 MG: 5 INJECTION INTRAVENOUS at 11:48

## 2020-08-04 RX ADMIN — HYDROMORPHONE HYDROCHLORIDE 1 MG: 2 INJECTION, SOLUTION INTRAMUSCULAR; INTRAVENOUS; SUBCUTANEOUS at 08:02

## 2020-08-04 RX ADMIN — ONDANSETRON 4 MG: 2 INJECTION INTRAMUSCULAR; INTRAVENOUS at 23:36

## 2020-08-04 RX ADMIN — METOPROLOL TARTRATE 2.5 MG: 5 INJECTION INTRAVENOUS at 17:54

## 2020-08-04 RX ADMIN — HYDROMORPHONE HYDROCHLORIDE 0.5 MG: 2 INJECTION, SOLUTION INTRAMUSCULAR; INTRAVENOUS; SUBCUTANEOUS at 03:48

## 2020-08-04 RX ADMIN — IOPAMIDOL 100 ML: 755 INJECTION, SOLUTION INTRAVENOUS at 04:34

## 2020-08-04 RX ADMIN — ASPIRIN 300 MG: 300 SUPPOSITORY RECTAL at 11:48

## 2020-08-04 RX ADMIN — MAGNESIUM SULFATE HEPTAHYDRATE 4 G: 40 INJECTION, SOLUTION INTRAVENOUS at 11:37

## 2020-08-04 RX ADMIN — HYDROMORPHONE HYDROCHLORIDE 0.5 MG: 2 INJECTION, SOLUTION INTRAMUSCULAR; INTRAVENOUS; SUBCUTANEOUS at 05:43

## 2020-08-04 RX ADMIN — ENOXAPARIN SODIUM 40 MG: 40 INJECTION, SOLUTION INTRAVENOUS; SUBCUTANEOUS at 16:36

## 2020-08-04 RX ADMIN — HYDROMORPHONE HYDROCHLORIDE 1 MG: 2 INJECTION, SOLUTION INTRAMUSCULAR; INTRAVENOUS; SUBCUTANEOUS at 16:36

## 2020-08-04 RX ADMIN — HYDROMORPHONE HYDROCHLORIDE 1 MG: 2 INJECTION, SOLUTION INTRAMUSCULAR; INTRAVENOUS; SUBCUTANEOUS at 23:36

## 2020-08-04 RX ADMIN — HYDROMORPHONE HYDROCHLORIDE 0.5 MG: 2 INJECTION, SOLUTION INTRAMUSCULAR; INTRAVENOUS; SUBCUTANEOUS at 03:17

## 2020-08-04 RX ADMIN — HYDROMORPHONE HYDROCHLORIDE 1 MG: 2 INJECTION, SOLUTION INTRAMUSCULAR; INTRAVENOUS; SUBCUTANEOUS at 10:29

## 2020-08-04 RX ADMIN — PIPERACILLIN AND TAZOBACTAM 3.38 G: 3; .375 INJECTION, POWDER, FOR SOLUTION INTRAVENOUS at 16:36

## 2020-08-04 RX ADMIN — ONDANSETRON 4 MG: 2 INJECTION INTRAMUSCULAR; INTRAVENOUS at 16:36

## 2020-08-04 RX ADMIN — Medication 10 ML: at 21:00

## 2020-08-04 RX ADMIN — HYDROMORPHONE HYDROCHLORIDE 1 MG: 2 INJECTION, SOLUTION INTRAMUSCULAR; INTRAVENOUS; SUBCUTANEOUS at 12:43

## 2020-08-04 RX ADMIN — ONDANSETRON 4 MG: 2 INJECTION INTRAMUSCULAR; INTRAVENOUS at 10:29

## 2020-08-04 NOTE — CONSULTS
GENERAL SURGERY CONSULT      Patient Care Team:  Yusuf Jones MD as PCP - General  PhiJoshua MD as Consulting Physician (Cardiology)  Deam, Moise Kathleen MD as Consulting Physician (Cardiac Electrophysiology)    Chief complaint  SBO  Subjective     74-year-old gentleman presents to the emergency room very early this morning with a 2-day history of increasing abdominal distention and vomiting.  His last bowel movement was yesterday morning.  He has a long history of recurrent bowel obstructions and was actually here 2 months ago with a bowel obstruction and also here about 4 or 5 months ago with 1 before that.  On both of these occasions the obstruction resolved with nonoperative management.  He has a history of ventral hernia repairs and I actually has been involved in 1 of those.  He has significant loss of domain and much of his intestine lies outside of the peritoneal cavity covered only with skin.  On this admission I have reviewed the CT scan and there appears to be obstruction in the right lower portion of the ventral hernia defect just before it enters the peritoneal cavity.  He has no leukocytosis and no fever.  A nasogastric tube is been placed and he has much less distention than he did before.  He still has had no bowel movement and no flatus.    Review of Systems   All systems were reviewed and negative except for:  Gastrointestinal: positive for emesis  nausea, pain and See HPI    History  Past Medical History:   Diagnosis Date   • Bruises easily    • CHF (congestive heart failure) (CMS/HCC)    • Congenital heart defect    • Difficulty attaining erection    • Hypertension    • Low back pain    • Poor circulation    • Prostate cancer (CMS/HCC)    • Shortness of breath    • Stented coronary artery 12/05/2019    MICHAEL to LAD     Past Surgical History:   Procedure Laterality Date   • APPENDECTOMY     • CARDIAC CATHETERIZATION N/A 12/5/2019    Procedure: Left Heart Cath;  Surgeon: Arpit  Mirza Laurent MD;  Location: [facility] CATH INVASIVE LOCATION;  Service: Cardiology   • CARDIAC CATHETERIZATION N/A 2019    Procedure: Stent MICHAEL coronary;  Surgeon: Mirza Munson MD;  Location: [facility] CATH INVASIVE LOCATION;  Service: Cardiology   • CHOLECYSTECTOMY     • ELBOW PROCEDURE      left   • HERNIA REPAIR     • INTERVENTIONAL RADIOLOGY PROCEDURE N/A 2019    Procedure: Intravascular Ultrasound;  Surgeon: Mirza Munson MD;  Location: [facility] CATH INVASIVE LOCATION;  Service: Cardiology   • AL RT/LT HEART CATHETERS N/A 2019    Procedure: Percutaneous Coronary Intervention;  Surgeon: Mirza Munson MD;  Location: [facility] CATH INVASIVE LOCATION;  Service: Cardiology     Family History   Problem Relation Age of Onset   • Alzheimer's disease Mother    • GI problems Father    • Heart disease Father      Social History     Tobacco Use   • Smoking status: Former Smoker     Years: 15.00     Start date: 1966     Last attempt to quit: 5/10/2020     Years since quittin.2   • Smokeless tobacco: Never Used   Substance Use Topics   • Alcohol use: Not Currently     Frequency: Never   • Drug use: No     Medications Prior to Admission   Medication Sig Dispense Refill Last Dose   • aspirin 81 MG EC tablet Take 1 tablet by mouth Daily. 30 tablet 1 Taking   • atorvastatin (LIPITOR) 40 MG tablet Take 1 tablet by mouth Every Night. 30 tablet 1 Taking   • digoxin (LANOXIN) 250 MCG tablet Take 250 mcg by mouth Daily.   Taking   • diltiaZEM CD (CARDIZEM CD) 120 MG 24 hr capsule Take 120 mg by mouth Daily.   Taking   • furosemide (LASIX) 40 MG tablet Take 40 mg by mouth Daily.   Taking   • losartan (COZAAR) 100 MG tablet Take 100 mg by mouth Daily.   Taking   • LYRICA 150 MG capsule Take 150 mg by mouth 2 (Two) Times a Day.   Taking   • metoprolol tartrate (LOPRESSOR) 25 MG tablet Take 1 tablet by mouth Every 12 (Twelve) Hours. 60 tablet 1 Taking   • mirtazapine (REMERON) 30 MG  tablet Take 30 mg by mouth Every Night.   Taking   • oxyCODONE-acetaminophen (Percocet)  MG per tablet Take 1 tablet by mouth Every 6 (Six) Hours As Needed for Moderate Pain . 120 tablet 0    • potassium chloride (K-DUR) 10 MEQ CR tablet Take 10 mEq by mouth 2 (Two) Times a Day.   Taking   • prasugrel (EFFIENT) 10 MG tablet Take 10 mg by mouth Daily.   Taking   • traZODone (DESYREL) 100 MG tablet Take 100 mg by mouth Every Night.   Taking   • VENTOLIN  (90 Base) MCG/ACT inhaler Inhale 2 puffs Every 6 (Six) Hours As Needed for Wheezing or Shortness of Air.   Taking     Allergies:  Haloperidol; Morphine; and Pantoprazole    Objective     Vital Signs  Temp:  [97.6 °F (36.4 °C)-98.2 °F (36.8 °C)] 98.1 °F (36.7 °C)  Heart Rate:  [69-79] 69  Resp:  [16-20] 17  BP: (146-203)/(61-98) 146/69    Physical Exam:      General Appearance:    Alert, cooperative, in no acute distress   Head:    Normocephalic, without obvious abnormality, atraumatic,    Eyes:            Lids and lashes normal, conjunctivae and sclerae normal, no   icterus, no pallor, corneas clear, PERRLA   Ears:    Ears appear intact with no abnormalities noted   Throat:   No oral lesions, no thrush, oral mucosa moist   Neck:   No adenopathy, supple, trachea midline, no thyromegaly, no   carotid bruit, no JVD   Back:     No kyphosis present, no scoliosis present, no skin lesions,      erythema or scars, no tenderness to percussion or                   palpation,   range of motion normal   Lungs:     Clear to auscultation,respirations regular, even and                  unlabored    Heart:    Regular rhythm and normal rate, normal S1 and S2, no            murmur, no gallop, no rub, no click   Chest Wall:    No abnormalities observed   Abdomen:    Very large ventral hernia defect with diffuse tenderness.  It is nonreducible.  There are a lot of scarring of the midline incision   Rectal:     Deferred   Extremities: No edema, good ROM   Pulses:   Pulses  palpable and equal bilaterally   Skin:   No bleeding, bruising or rash   Lymph nodes:   No palpable adenopathy   Neurologic:   Cranial nerves 2 - 12 grossly intact, sensation intact, DTR       present and equal bilaterally     Lab Results (last 24 hours)     Procedure Component Value Units Date/Time    POC Glucose Once [[id number]]  (Abnormal) Collected:  08/04/20 1629    Specimen:  Blood Updated:  08/04/20 1633     Glucose 130 mg/dL      Comment: Serial Number: [id number]:  [id number]       POC Glucose Once [[id number]]  (Abnormal) Collected:  08/04/20 1247    Specimen:  Blood Updated:  08/04/20 1249     Glucose 139 mg/dL      Comment: Serial Number: [id number]:  [id number]       Urinalysis With Culture If Indicated - Urine, Clean Catch [[id number]]  (Abnormal) Collected:  08/04/20 1150    Specimen:  Urine, Clean Catch Updated:  08/04/20 1226     Color, UA Yellow     Appearance, UA Clear     pH, UA 5.5     Specific Gravity, UA 1.052     Glucose, UA Negative     Ketones, UA Negative     Bilirubin, UA Negative     Blood, UA Negative     Protein, UA Negative     Leuk Esterase, UA Negative     Nitrite, UA Negative     Urobilinogen, UA 0.2 E.U./dL    Narrative:       Urine microscopic not indicated.    Hemoglobin A1c [[id number]]  (Normal) Collected:  08/04/20 0308    Specimen:  Blood Updated:  08/04/20 1046     Hemoglobin A1C 5.6 %     Narrative:       Hemoglobin A1C Reference Range:    <5.7 %        Normal  5.7-6.4 %     Increased risk for diabetes  > 6.4 %        Diabetes       These guidelines have been recommended by the American Diabetic Association for Hgb A1c.      The following 2010 guidelines have been recommended by the American Diabetes Association for Hemoglobin A1c.    HBA1c 5.7-6.4% Increased risk for future diabetes (pre-diabetes)  HBA1c     >6.4% Diabetes      Magnesium [[id number]]  (Normal) Collected:  08/04/20 0308    Specimen:  Blood Updated:  08/04/20 0913     Magnesium 1.6 mg/dL      Phosphorus [[id number]]  (Normal) Collected:  08/04/20 0308    Specimen:  Blood Updated:  08/04/20 0913     Phosphorus 3.9 mg/dL     BUN [[id number]]  (Normal) Collected:  08/04/20 0308    Specimen:  Blood Updated:  08/04/20 0347     BUN 16 mg/dL     Comprehensive Metabolic Panel [[id number]]  (Abnormal) Collected:  08/04/20 0308    Specimen:  Blood Updated:  08/04/20 0340     Glucose 176 mg/dL      BUN --     Comment: Testing performed by alternate method        Creatinine 1.17 mg/dL      Sodium 137 mmol/L      Potassium 3.9 mmol/L      Chloride 99 mmol/L      CO2 24.0 mmol/L      Calcium 9.1 mg/dL      Total Protein 6.5 g/dL      Albumin 3.60 g/dL      ALT (SGPT) 8 U/L      AST (SGOT) 15 U/L      Alkaline Phosphatase 143 U/L      Total Bilirubin 0.9 mg/dL      eGFR Non African Amer 61 mL/min/1.73      Globulin 2.9 gm/dL      A/G Ratio 1.2 g/dL      BUN/Creatinine Ratio --     Comment: Testing not performed        Anion Gap 14.0 mmol/L     Narrative:       GFR Normal >60  Chronic Kidney Disease <60  Kidney Failure <15      Lipase [[id number]]  (Normal) Collected:  08/04/20 0308    Specimen:  Blood Updated:  08/04/20 0340     Lipase 13 U/L     Protime-INR [[id number]]  (Normal) Collected:  08/04/20 0308    Specimen:  Blood Updated:  08/04/20 0326     Protime 10.7 Seconds      INR 1.03    aPTT [[id number]]  (Normal) Collected:  08/04/20 0308    Specimen:  Blood Updated:  08/04/20 0326     PTT 25.0 seconds     CBC & Differential [[id number]] Collected:  08/04/20 0308    Specimen:  Blood Updated:  08/04/20 0321    Narrative:       The following orders were created for panel order CBC & Differential.  Procedure                               Abnormality         Status                     ---------                               -----------         ------                     CBC Auto Differential[[id number]]        Abnormal            Final result                 Please view results for these tests on the individual orders.    CBC  Auto Differential [[id number]]  (Abnormal) Collected:  08/04/20 0308    Specimen:  Blood Updated:  08/04/20 0321     WBC 9.60 10*3/mm3      RBC 4.79 10*6/mm3      Hemoglobin 14.8 g/dL      Hematocrit 42.7 %      MCV 89.1 fL      MCH 31.0 pg      MCHC 34.8 g/dL      RDW 15.8 %      RDW-SD 49.0 fl      MPV 8.2 fL      Platelets 331 10*3/mm3      Neutrophil % 84.0 %      Lymphocyte % 6.8 %      Monocyte % 5.7 %      Eosinophil % 2.8 %      Basophil % 0.7 %      Neutrophils, Absolute 8.10 10*3/mm3      Lymphocytes, Absolute 0.60 10*3/mm3      Monocytes, Absolute 0.60 10*3/mm3      Eosinophils, Absolute 0.30 10*3/mm3      Basophils, Absolute 0.10 10*3/mm3      nRBC 0.1 /100 WBC           Imaging Results (Last 24 Hours)     Procedure Component Value Units Date/Time    CT Abdomen Pelvis With Contrast [[id number]] Collected:  08/04/20 0251     Updated:  08/04/20 0500    Narrative:       EXAMINATION: CT SCAN OF THE ABDOMEN AND PELVIS WITH INTRAVENOUS CONTRAST    DATE OF EXAM: 8/4/2020 4:16 AM    HISTORY: Abdominal pain, recurrent bowel obstructions.    COMPARISON: 6/8/2020.    TECHNIQUE: CT examination of the abdomen and pelvis was performed following the intravenous administration of 100 mL  Isovue-370. CT dose lowering techniques were used, to include: automated exposure control, adjustment for patient size, and/or use of   iterative reconstruction.    FINDINGS:    ABDOMEN/PELVIS:    Lower Chest: Lung bases are clear. Cardiac pacer    Liver: Normal.     Gallbladder/Biliary: Cholecystectomy    Pancreas: Normal.     Spleen: Normal.     Adrenal Glands: Normal.     Kidneys: Normal.     GI Tract: There is a large anterior abdominal wall hernia with a high-grade small bowel obstruction occurring in the right side of the hernia sac against the abdominal wall. The small bowel proximal to this site is dilated and filled with fluid. There is   some chronic pneumatosis in the bowel in the anterior and right side of the hernia sac as  well as traces of free air. Findings appear generally similar to prior imaging.    Mesentery/Peritoneum: Normal.    Vasculature: Normal.     Lymph Nodes: Normal.     Abdominal Wall: Normal.     Bladder: Normal.     Reproductive: Normal.     Musculoskeletal: Normal.        Impression:         1.  Findings appear similar to the patient's prior CT from June 8, 2020.  2.  Large anterior abdominal wall hernia with a high-grade bowel obstruction occurring in the distal small bowel along the right side of the hernia sac as the bowel reenters the abdominal cavity.  3.  Chronic, fairly extensive pneumatosis involving multiple loops of dilated small bowel in the hernia sac as well as a few small locules of free air. These findings were all present on prior imaging. Surgical consultation is recommended.    Electronically signed by:  Zechariah Handley M.D.    8/4/2020 2:59 AM          Results Review:    I reviewed the patient's new clinical results.  I reviewed the patient's new imaging results and agree with the interpretation.    Assessment/Plan       SBO (small bowel obstruction) (CMS/HCC)    73-year-old gentleman with very large ventral hernia defect and loss of domain.  He has recurrent bowel obstructions.  In the past these have resolved with nasogastric tube decompression.  I believe this will likely resolve with ongoing nasogastric tube decompression and hopefully we can start advancing his diet tomorrow.  I know that these bowel obstructions are recurrent but the operation to address this is potentially life-threatening.      Cleopatra Wang MD  08/04/20  18:34

## 2020-08-04 NOTE — H&P
History and Physical      Patient Care Team:  Yusuf Jones MD as PCP - General  Joshua Yip MD as Consulting Physician (Cardiology)  Deam, Moise Kathleen MD as Consulting Physician (Cardiac Electrophysiology)    Chief complaint increasing abdominal pain    Subjective     Patient is a 74 y.o. male presents with increasing abdominal pain and distention.  Patient has had this before and knew that he was developing probable bowel obstruction over the previous 3 to 4 days.  Finally the pain become unbearable and he came to emergency department and is found to have a high-grade bowel obstruction was subsequently admitted.  When I see him this morning, worker to start him on IV fluids and also parenteral pain medication.  He denies any fevers chills cough or shortness of breath.  Currently not with any nausea or vomiting.  Patient tells me that in the past, his usual PCP did not feel he was a operative candidate due to his multiple underlying medical comorbidities.  However, patient says the worst that he has had in terms of abdominal pain and distention.  He rates his pain when this started about a 9-10 out of 10 cramping pressure and the pain was quite nauseating but he did not throw up.  Review of Systems   All systems were reviewed and negative except for: See HPI    History  Past Medical History:   Diagnosis Date   • Bruises easily    • CHF (congestive heart failure) (CMS/HCC)    • Congenital heart defect    • Difficulty attaining erection    • Hypertension    • Low back pain    • Poor circulation    • Prostate cancer (CMS/HCC)    • Shortness of breath    • Stented coronary artery 12/05/2019    MICHAEL to LAD     Past Surgical History:   Procedure Laterality Date   • APPENDECTOMY     • CARDIAC CATHETERIZATION N/A 12/5/2019    Procedure: Left Heart Cath;  Surgeon: Mirza Munson MD;  Location: [facility] CATH INVASIVE LOCATION;  Service: Cardiology   • CARDIAC CATHETERIZATION N/A 12/5/2019      Procedure: Stent MICHAEL coronary;  Surgeon: Mirza Munson MD;  Location: [facility] CATH INVASIVE LOCATION;  Service: Cardiology   • CHOLECYSTECTOMY     • ELBOW PROCEDURE      left   • HERNIA REPAIR     • INTERVENTIONAL RADIOLOGY PROCEDURE N/A 2019    Procedure: Intravascular Ultrasound;  Surgeon: Mirza Munson MD;  Location: [facility] CATH INVASIVE LOCATION;  Service: Cardiology   • [place] RT/LT HEART CATHETERS N/A 2019    Procedure: Percutaneous Coronary Intervention;  Surgeon: Mirza Munson MD;  Location: [facility] CATH INVASIVE LOCATION;  Service: Cardiology     Family History   Problem Relation Age of Onset   • Alzheimer's disease Mother    • GI problems Father    • Heart disease Father      Social History     Tobacco Use   • Smoking status: Former Smoker     Years: 15.00     Start date: 1966     Last attempt to quit: 5/10/2020     Years since quittin.2   • Smokeless tobacco: Never Used   Substance Use Topics   • Alcohol use: Not Currently     Frequency: Never   • Drug use: No     Allergies:  Haloperidol; Morphine; and Pantoprazole    Objective     Vital Signs  Temp:  [97.9 °F (36.6 °C)-98.2 °F (36.8 °C)] 97.9 °F (36.6 °C)  Heart Rate:  [69-75] 69  Resp:  [16-20] 16  BP: (148-203)/(61-98) 165/83      Physical Exam:      General Appearance:    Alert, cooperative, in mild/moderate distress   Head:    Normocephalic, without obvious abnormality, atraumatic   Eyes:           Conjunctivae and sclerae normal, no   icterus, no pallor, corneas clear, PERRLA   Throat:   No oral lesions, no thrush, oral mucosa moist   Neck:   No adenopathy, supple, trachea midline, no thyromegaly, no   carotid bruit, no JVD   Lungs:    Distant but adequate adventitial flow, some coarse upper airway sounds    Heart:    Regular rhythm and normal rate, normal S1 and S2, no            murmur, no gallop, no rub, no click.  Occasional ectopic   Chest Wall:    No abnormalities observed   Abdomen:     Markedly distended and tender in all quadrants,  absent bowel sounds   Rectal:     Deferred   Extremities:   Moves all extremities , nonpitting edema in feet to pretibial area, no cyanosis, no             redness   Pulses:   Pulses palpable and equal bilaterally   Skin:   No bleeding, bruising or rash   Lymph nodes:   No palpable adenopathy   Neurologic:   Cranial nerves 2 - 12 grossly intact, sensation intact, DTR       present and equal bilaterally       Labs:  Lab Results (last 24 hours)     Procedure Component Value Units Date/Time    BUN [[id number]]  (Normal) Collected:  08/04/20 0308    Specimen:  Blood Updated:  08/04/20 0347     BUN 16 mg/dL     Comprehensive Metabolic Panel [[id number]]  (Abnormal) Collected:  08/04/20 0308    Specimen:  Blood Updated:  08/04/20 0340     Glucose 176 mg/dL      BUN --     Comment: Testing performed by alternate method        Creatinine 1.17 mg/dL      Sodium 137 mmol/L      Potassium 3.9 mmol/L      Chloride 99 mmol/L      CO2 24.0 mmol/L      Calcium 9.1 mg/dL      Total Protein 6.5 g/dL      Albumin 3.60 g/dL      ALT (SGPT) 8 U/L      AST (SGOT) 15 U/L      Alkaline Phosphatase 143 U/L      Total Bilirubin 0.9 mg/dL      eGFR Non African Amer 61 mL/min/1.73      Globulin 2.9 gm/dL      A/G Ratio 1.2 g/dL      BUN/Creatinine Ratio --     Comment: Testing not performed        Anion Gap 14.0 mmol/L     Narrative:       GFR Normal >60  Chronic Kidney Disease <60  Kidney Failure <15      Lipase [[id number]]  (Normal) Collected:  08/04/20 0308    Specimen:  Blood Updated:  08/04/20 0340     Lipase 13 U/L     Protime-INR [[id number]]  (Normal) Collected:  08/04/20 0308    Specimen:  Blood Updated:  08/04/20 0326     Protime 10.7 Seconds      INR 1.03    aPTT [[id number]]  (Normal) Collected:  08/04/20 0308    Specimen:  Blood Updated:  08/04/20 0326     PTT 25.0 seconds     CBC & Differential [[id number]] Collected:  08/04/20 0308    Specimen:  Blood Updated:  08/04/20 0321     Narrative:       The following orders were created for panel order CBC & Differential.  Procedure                               Abnormality         Status                     ---------                               -----------         ------                     CBC Auto Differential[[id number]]        Abnormal            Final result                 Please view results for these tests on the individual orders.    CBC Auto Differential [[id number]]  (Abnormal) Collected:  08/04/20 0308    Specimen:  Blood Updated:  08/04/20 0321     WBC 9.60 10*3/mm3      RBC 4.79 10*6/mm3      Hemoglobin 14.8 g/dL      Hematocrit 42.7 %      MCV 89.1 fL      MCH 31.0 pg      MCHC 34.8 g/dL      RDW 15.8 %      RDW-SD 49.0 fl      MPV 8.2 fL      Platelets 331 10*3/mm3      Neutrophil % 84.0 %      Lymphocyte % 6.8 %      Monocyte % 5.7 %      Eosinophil % 2.8 %      Basophil % 0.7 %      Neutrophils, Absolute 8.10 10*3/mm3      Lymphocytes, Absolute 0.60 10*3/mm3      Monocytes, Absolute 0.60 10*3/mm3      Eosinophils, Absolute 0.30 10*3/mm3      Basophils, Absolute 0.10 10*3/mm3      nRBC 0.1 /100 WBC           Radiology:  Ct Abdomen Pelvis With Contrast    Result Date: 8/4/2020  1.  Findings appear similar to the patient's prior CT from June 8, 2020. 2.  Large anterior abdominal wall hernia with a high-grade bowel obstruction occurring in the distal small bowel along the right side of the hernia sac as the bowel reenters the abdominal cavity. 3.  Chronic, fairly extensive pneumatosis involving multiple loops of dilated small bowel in the hernia sac as well as a few small locules of free air. These findings were all present on prior imaging. Surgical consultation is recommended. Electronically signed by:  Zechariah Handley M.D.  8/4/2020 2:59 AM        Results Review:    I reviewed the patient's new clinical results.  I reviewed the patient's new imaging results and agree with the interpretation.    Assessment/Plan       SBO (small  bowel obstruction) (CMS/HCC)  Chronic pneumatosis in multiple loops of dilated small bowel  Type 2 diabetes  Essential hypertension  Stage III chronic kidney disease  Mixed simple and mucopurulent chronic bronchitis  Monoclonal paraproteinemia  Morbid obesity  Narcotic dependency  Obstructive sleep apnea  Dyslipidemia  Liver cirrhosis  Paroxysmal atrial fibrillation  Polyneuropathy  Peripheral vascular disease    Patient is been admitted and started on parenteral antibiotics, fluids and pain medications.  His usual cardiologist is been in consulted for possible preop cardiac clearance given patient's multiple underlying problems.  General surgery of course is already been consulted through the emergency department.  Hold his usual outpatient medications as patient is n.p.o. currently.  Start hydralazine as needed systolic hypertension.  Orders on chart and will follow.    Expected Length of Stay 3-5 days    I discussed the patients findings and my recommendations with patient and nursing staff.     Jovana Dupont DO  08/04/20  09:00

## 2020-08-04 NOTE — ED NOTES
Pt c/o abdominal distention x3 days and abdominal pain that started Sunday night. Pt reports hx bowel blockage and hernias.      Natasha Rizo, LPN  08/04/20 2082

## 2020-08-04 NOTE — PROGRESS NOTES
Discharge Planning Assessment  [facility]     Patient Name: Ro Nathan  MRN: [id number]  Today's Date: 8/4/2020    Admit Date: 8/4/2020    Discharge Needs Assessment     Row Name 08/04/20 1421       Discharge Needs Assessment    Discharge Coordination/Progress  Anticipate discharge home with spouse and possibly HH.     Row Name 08/04/20 1419       Living Environment    Family Caregiver if Needed  spouse    Quality of Family Relationships  supportive    Able to Return to Prior Arrangements  -- unknown at this time.        Resource/Environmental Concerns    Resource/Environmental Concerns  none    Transportation Concerns  car, none       Transition Planning    Patient/Family Anticipates Transition to  home with family    Patient/Family Anticipated Services at Transition  none    Transportation Anticipated  family or friend will provide       Discharge Needs Assessment    Readmission Within the Last 30 Days  no previous admission in last 30 days    Concerns to be Addressed  no discharge needs identified    Equipment Currently Used at Home  none    Anticipated Changes Related to Illness  none    Equipment Needed After Discharge  none        Discharge Plan     Row Name 08/04/20 1421       Plan    Plan  Anticipate discharge home with spouse and possibly HH.     Provided Post Acute Provider List?  N/A    Patient/Family in Agreement with Plan  yes    Plan Comments  Spoke briefly over the phone with patient. Patient feeling almost too ill to speak at this time. Verified PCP and pharmacy and that he lives with his wife. Barriers to discharge: Cardiology consult and surgery consult.             Anna [place]ae[place] [address]   [phone]  office  [phone]  fax  [email or web address]  [email or web address]

## 2020-08-04 NOTE — ED PROVIDER NOTES
Subjective   74-year-old male with history of recurrent bowel obstructions complains of moderate abdominal pain, worse with p.o. intake associate with nausea and vomiting for the past 2 days.  Patient last bowel movement was yesterday morning, normal nonbloody.  No fever cough or shortness of air.          Review of Systems   Gastrointestinal: Positive for abdominal distention, abdominal pain, nausea and vomiting.   All other systems reviewed and are negative.      Past Medical History:   Diagnosis Date   • Bruises easily    • CHF (congestive heart failure) (CMS/HCC)    • Congenital heart defect    • Difficulty attaining erection    • Hypertension    • Low back pain    • Poor circulation    • Prostate cancer (CMS/HCC)    • Shortness of breath    • Stented coronary artery 12/05/2019    MICHAEL to LAD       Allergies   Allergen Reactions   • Haloperidol Hives   • Morphine Itching   • Pantoprazole Other (See Comments)     Feels sick after receiving       Past Surgical History:   Procedure Laterality Date   • APPENDECTOMY     • CARDIAC CATHETERIZATION N/A 12/5/2019    Procedure: Left Heart Cath;  Surgeon: Mirza Munson MD;  Location: [facility] CATH INVASIVE LOCATION;  Service: Cardiology   • CARDIAC CATHETERIZATION N/A 12/5/2019    Procedure: Stent MICHAEL coronary;  Surgeon: Mirza Munson MD;  Location: [facility] CATH INVASIVE LOCATION;  Service: Cardiology   • CHOLECYSTECTOMY     • ELBOW PROCEDURE      left   • HERNIA REPAIR     • INTERVENTIONAL RADIOLOGY PROCEDURE N/A 12/5/2019    Procedure: Intravascular Ultrasound;  Surgeon: Mirza Munson MD;  Location: [facility] CATH INVASIVE LOCATION;  Service: Cardiology   • [place] RT/LT HEART CATHETERS N/A 12/5/2019    Procedure: Percutaneous Coronary Intervention;  Surgeon: Mirza Munson MD;  Location: [facility] CATH INVASIVE LOCATION;  Service: Cardiology       Family History   Problem Relation Age of Onset   • Alzheimer's disease Mother    • GI  problems Father    • Heart disease Father        Social History     Socioeconomic History   • Marital status:      Spouse name: Not on file   • Number of children: Not on file   • Years of education: Not on file   • Highest education level: Not on file   Tobacco Use   • Smoking status: Former Smoker     Years: 15.00     Start date: 1966     Last attempt to quit: 5/10/2020     Years since quittin.2   • Smokeless tobacco: Never Used   Substance and Sexual Activity   • Alcohol use: Not Currently     Frequency: Never   • Drug use: No   • Sexual activity: Defer           Objective   Physical Exam   Constitutional: He is oriented to person, place, and time. He appears well-developed and well-nourished.   HENT:   Head: Normocephalic and atraumatic.   Mouth/Throat: Oropharynx is clear and moist.   Eyes: Pupils are equal, round, and reactive to light. Conjunctivae are normal.   Neck: Normal range of motion. Neck supple.   Cardiovascular: Normal rate, regular rhythm, normal heart sounds and intact distal pulses.   Pulmonary/Chest: Effort normal and breath sounds normal.   Abdominal:   Diffuse abdominal distention with ventral hernias, mild diffuse tenderness to palpation, no rebound or guarding   Musculoskeletal: Normal range of motion. He exhibits no edema or tenderness.   Neurological: He is alert and oriented to person, place, and time.   Skin: Skin is warm and dry. Capillary refill takes less than 2 seconds.   Psychiatric: He has a normal mood and affect. His behavior is normal.       Procedures           ED Course                                           MDM  Number of Diagnoses or Management Options  SBO (small bowel obstruction) (CMS/HCC):   Diagnosis management comments: Results for orders placed or performed during the hospital encounter of 20  -Comprehensive Metabolic Panel       Result                      Value             Ref Range           Glucose                     176 (H)           65 -  99 mg/dL       BUN                                                               Creatinine                  1.17              0.76 - 1.27 *       Sodium                      137               136 - 145 mm*       Potassium                   3.9               3.5 - 5.2 mm*       Chloride                    99                98 - 107 mmo*       CO2                         24.0              22.0 - 29.0 *       Calcium                     9.1               8.6 - 10.5 m*       Total Protein               6.5               6.0 - 8.5 g/*       Albumin                     3.60              3.50 - 5.20 *       ALT (SGPT)                  8                 1 - 41 U/L          AST (SGOT)                  15                1 - 40 U/L          Alkaline Phosphatase        143 (H)           39 - 117 U/L        Total Bilirubin             0.9               0.0 - 1.2 mg*       eGFR Non  Amer       61                >60 mL/min/1*       Globulin                    2.9               gm/dL               A/G Ratio                   1.2               g/dL                BUN/Creatinine Ratio                                              Anion Gap                   14.0              5.0 - 15.0 m*  -Lipase       Result                      Value             Ref Range           Lipase                      13                13 - 60 U/L    -Protime-INR       Result                      Value             Ref Range           Protime                     10.7              9.6 - 11.7 S*       INR                         1.03              0.90 - 1.10    -aPTT       Result                      Value             Ref Range           PTT                         25.0              24.0 - 31.0 *  -CBC Auto Differential       Result                      Value             Ref Range           WBC                         9.60              3.40 - 10.80*       RBC                         4.79              4.14 - 5.80 *       Hemoglobin                  14.8               13.0 - 17.7 *       Hematocrit                  42.7              37.5 - 51.0 %       MCV                         89.1              79.0 - 97.0 *       MCH                         31.0              26.6 - 33.0 *       MCHC                        34.8              31.5 - 35.7 *       RDW                         15.8 (H)          12.3 - 15.4 %       RDW-SD                      49.0              37.0 - 54.0 *       MPV                         8.2               6.0 - 12.0 fL       Platelets                   331               140 - 450 10*       Neutrophil %                84.0 (H)          42.7 - 76.0 %       Lymphocyte %                6.8 (L)           19.6 - 45.3 %       Monocyte %                  5.7               5.0 - 12.0 %        Eosinophil %                2.8               0.3 - 6.2 %         Basophil %                  0.7               0.0 - 1.5 %         Neutrophils, Absolute       8.10 (H)          1.70 - 7.00 *       Lymphocytes, Absolute       0.60 (L)          0.70 - 3.10 *       Monocytes, Absolute         0.60              0.10 - 0.90 *       Eosinophils, Absolute       0.30              0.00 - 0.40 *       Basophils, Absolute         0.10              0.00 - 0.20 *       nRBC                        0.1               0.0 - 0.2 /1*  -BUN       Result                      Value             Ref Range           BUN                         16                8 - 23 mg/dL   Ct Abdomen Pelvis With Contrast    Result Date: 8/4/2020  1.  Findings appear similar to the patient's prior CT from June 8, 2020. 2.  Large anterior abdominal wall hernia with a high-grade bowel obstruction occurring in the distal small bowel along the right side of the hernia sac as the bowel reenters the abdominal cavity. 3.  Chronic, fairly extensive pneumatosis involving multiple loops of dilated small bowel in the hernia sac as well as a few small locules of free air. These findings were all present on prior imaging.  Surgical consultation is recommended. Electronically signed by:  Zechariah Handley M.D.  8/4/2020 2:59 AM    Well, ng being placed by nursing, Dr Dupont accepts for Dr Jones.       Amount and/or Complexity of Data Reviewed  Clinical lab tests: reviewed  Tests in the radiology section of CPT®: reviewed        Final diagnoses:   SBO (small bowel obstruction) (CMS/HCC)            Hill Whitfield MD  08/04/20 0524

## 2020-08-04 NOTE — CONSULTS
"[facility] HEART SPECIALISTS CONSULT        Subjective:     Encounter Date:08/04/2020      Patient ID: Ro Nathan is a 74 y.o. male.    Chief Complaint: abdominal pain      HPI:  Ro Nathan is a 74 y.o. male known to Dr. iYp with a past cardiac history of PAF (not on anticoagulation given hx GIB), PPM, and CAD s/p PCI with MICHAEL to LAD 12/2019 with residual borderline disease of the LCx being treated medically.  He has a PMH of HTN, HLD, COPD, stage III CKD, and GERD.  Patient presents with c/o abdominal pain, swelling, and anorexia and found with a small bowel obstruction.  He is being evaluated by surgery for possible intervention, and cardiology was consulted for pre-operative clearance.  Patient reports that he has infrequent intermittent transient right chest pain.  He does admit to worsened GIORDANO over the past 2 months with activities such as walking his dogs.  He tells me this is similar to his prior angina.  He works a low activity level job in the [employer] at [email or web address] but tells me he feels \"wiped\" after working.  He reports compliance with dual antiplatelet therapy.  He denies dizziness or pre or erick syncope.  He denies PND/orthopnea but reports bilateral ankle edema over the past few days.        Past Medical History:   Diagnosis Date   • Bruises easily    • CHF (congestive heart failure) (CMS/HCC)    • Congenital heart defect    • Difficulty attaining erection    • Hypertension    • Low back pain    • Poor circulation    • Prostate cancer (CMS/HCC)    • Shortness of breath    • Stented coronary artery 12/05/2019    MICHAEL to LAD         Past Surgical History:   Procedure Laterality Date   • APPENDECTOMY     • CARDIAC CATHETERIZATION N/A 12/5/2019    Procedure: Left Heart Cath;  Surgeon: Mirza Munson MD;  Location: [facility] CATH INVASIVE LOCATION;  Service: Cardiology   • CARDIAC CATHETERIZATION N/A 12/5/2019    Procedure: Stent MICHAEL coronary;  Surgeon: Mirza Munson, " MD;  Location: [facility] CATH INVASIVE LOCATION;  Service: Cardiology   • CHOLECYSTECTOMY     • ELBOW PROCEDURE      left   • HERNIA REPAIR     • INTERVENTIONAL RADIOLOGY PROCEDURE N/A 2019    Procedure: Intravascular Ultrasound;  Surgeon: Mirza Munson MD;  Location: [facility] CATH INVASIVE LOCATION;  Service: Cardiology   • OK RT/LT HEART CATHETERS N/A 2019    Procedure: Percutaneous Coronary Intervention;  Surgeon: Mirza Munson MD;  Location: [facility] CATH INVASIVE LOCATION;  Service: Cardiology         Social History     Socioeconomic History   • Marital status:      Spouse name: Not on file   • Number of children: Not on file   • Years of education: Not on file   • Highest education level: Not on file   Tobacco Use   • Smoking status: Former Smoker     Years: 15.00     Start date: 1966     Last attempt to quit: 5/10/2020     Years since quittin.2   • Smokeless tobacco: Never Used   Substance and Sexual Activity   • Alcohol use: Not Currently     Frequency: Never   • Drug use: No   • Sexual activity: Defer       Family History   Problem Relation Age of Onset   • Alzheimer's disease Mother    • GI problems Father    • Heart disease Father          Allergies   Allergen Reactions   • Haloperidol Hives   • Morphine Itching   • Pantoprazole Other (See Comments)     Feels sick after receiving       Current Medications:   Scheduled Meds:  enoxaparin 40 mg Subcutaneous Q24H   insulin lispro 0-7 Units Subcutaneous Q6H   sodium chloride 10 mL Intravenous Q12H     Continuous Infusions:  lactated ringers 150 mL/hr Last Rate: 150 mL/hr (20 0802)       ROS  Review of Symptoms:  Constitutional: Patient afebrile no chills or unexpected weight changes  Respiratory: No cough, no wheezing or dyspnea  Cardiovascular: Today the patient complains of no chest pain, palpitations, dyspnea, orthopnea and no edema  Gastrointestinal: No nausea, vomiting, constipation or diarrhea.  No melena  "or dark stools    All other systems reviewed and are negative         Objective:         /93 (BP Location: Left arm, Patient Position: Lying)   Pulse 79   Temp 97.6 °F (36.4 °C) (Oral)   Resp 17   Ht 182.9 cm (72\")   Wt 118 kg (260 lb 5.8 oz)   SpO2 95%   BMI 35.31 kg/m²     Physical exam  Constitutional: well-nourished, and appears stated age in no acute distress  PERRL: Conjunctiva clear, no pallor, anicteric  HENMT: normocephalic, normal dentition, no cyanosis or pallor  Neck:no bruits, or thrills and bilateral normal carotid upstroke. Normal jugular venous pressure  Cardiovascular: No parasternal heaves an non-displaced focal PMI. Normal rate and rhythm: no rub, gallop, murmur or click and normal S1 and S2; no lower or upper extremity edema.   Lungs: unlabored, no wheezing with no rales or rhonchi on auscultation.  Extremities: Warm, no clubbing, cyanosis. Full and equal peripheral pulses in extremities with no bruits appreciated.   Abdomen: soft, non-tender, non-distended  Musculoskeletal: no joint tenderness or swelling and no erythema  Skin: Warm and dry, non-erythematous   Neuro:alert and normal affect. Oriented to time, place and person.         Lab Review:     Results from last 7 days   Lab Units 08/04/20  0308   SODIUM mmol/L 137   POTASSIUM mmol/L 3.9   CHLORIDE mmol/L 99   CO2 mmol/L 24.0   BUN  16   CREATININE mg/dL 1.17   GLUCOSE mg/dL 176*   CALCIUM mg/dL 9.1   AST (SGOT) U/L 15   ALT (SGPT) U/L 8         Results from last 7 days   Lab Units 08/04/20  0308   WBC 10*3/mm3 9.60   HEMOGLOBIN g/dL 14.8   HEMATOCRIT % 42.7   PLATELETS 10*3/mm3 331     Results from last 7 days   Lab Units 08/04/20  0308   INR  1.03   APTT seconds 25.0     Results from last 7 days   Lab Units 08/04/20  0308   MAGNESIUM mg/dL 1.6           Invalid input(s): LDLCALC            Recent Radiology:  Imaging Results (Most Recent)     Procedure Component Value Units Date/Time    CT Abdomen Pelvis With Contrast " [[id number]] Collected:  08/04/20 0251     Updated:  08/04/20 0500    Narrative:       EXAMINATION: CT SCAN OF THE ABDOMEN AND PELVIS WITH INTRAVENOUS CONTRAST    DATE OF EXAM: 8/4/2020 4:16 AM    HISTORY: Abdominal pain, recurrent bowel obstructions.    COMPARISON: 6/8/2020.    TECHNIQUE: CT examination of the abdomen and pelvis was performed following the intravenous administration of 100 mL  Isovue-370. CT dose lowering techniques were used, to include: automated exposure control, adjustment for patient size, and/or use of   iterative reconstruction.    FINDINGS:    ABDOMEN/PELVIS:    Lower Chest: Lung bases are clear. Cardiac pacer    Liver: Normal.     Gallbladder/Biliary: Cholecystectomy    Pancreas: Normal.     Spleen: Normal.     Adrenal Glands: Normal.     Kidneys: Normal.     GI Tract: There is a large anterior abdominal wall hernia with a high-grade small bowel obstruction occurring in the right side of the hernia sac against the abdominal wall. The small bowel proximal to this site is dilated and filled with fluid. There is   some chronic pneumatosis in the bowel in the anterior and right side of the hernia sac as well as traces of free air. Findings appear generally similar to prior imaging.    Mesentery/Peritoneum: Normal.    Vasculature: Normal.     Lymph Nodes: Normal.     Abdominal Wall: Normal.     Bladder: Normal.     Reproductive: Normal.     Musculoskeletal: Normal.        Impression:         1.  Findings appear similar to the patient's prior CT from June 8, 2020.  2.  Large anterior abdominal wall hernia with a high-grade bowel obstruction occurring in the distal small bowel along the right side of the hernia sac as the bowel reenters the abdominal cavity.  3.  Chronic, fairly extensive pneumatosis involving multiple loops of dilated small bowel in the hernia sac as well as a few small locules of free air. These findings were all present on prior imaging. Surgical consultation is  recommended.    Electronically signed by:  Zechariah Handley M.D.    8/4/2020 2:59 AM            ECHOCARDIOGRAM:    Results for orders placed during the hospital encounter of 12/03/19   Adult Transthoracic Echo Complete W/ Cont if Necessary Per Protocol    Narrative · Mild mitral valve regurgitation is present  · Mild dilation of the sinuses of Valsalva is present.  · Left ventricular systolic function is hyperdynamic (EF > 70).  · Left atrial cavity size is severely dilated.  · Right ventricular cavity is mildly dilated.  · Left ventricular diastolic dysfunction (grade II) consistent with   pseudonormalization.     Overall normal LV systolic function and size  Normal RV function, mild RV enlargement  Severe left atrial enlargement  Diastolic dysfunction indeterminate by criteria  No masses  No effusion seen  EF hyperdynamic greater than 70%  No significant valvulopathy seen               Assessment:         Active Hospital Problems    Diagnosis POA   • SBO (small bowel obstruction) (CMS/HCC) [K56.609] Yes     1) SBO  - NPO; NG tube present  - surgery evaluating for possible intervention    2) Pre-operative Cardiac Evaluation  - last cath 12/2019 as below  - 2D echo 12/2019 showed EF > 70%, grade 2 diastolic dysfunction, mild MR.  - check EKG    3) CAD s/p PCI with MICHAEL to LAD 12/2019   - residual borderline disease of the LCx being treated medically.  - on dual antiplatelet therapy with aspirin and effient, BB, high dose statin at home    4) PAF / PPM  - rate controlled with BB, CCB, and PO dig at home  - not on anticoagulation given hx GIB    5) COPD    6) stage III CKD    7) HTN  - IV PRN hydralazine while NPO    8) HLD  - on statin at home           Plan:   Patient s/p PCI with MICHAEL 8 months ago with residual borderline LCx dz and c/o class III angina.  Will obtain baseline EKG.  Patient eligible to interrupt DAPT given PCI with MICHAEL > 6 months ago.  Will give aspirin via suppository and BB q6h IV while NPO.   Will replete Mg.  Monitor daily weights while on IVFs given hx grade 2 diastolic dysfunction.  No contraindication to proceeding forward with surgery from a cardiovascular standpoint.     .

## 2020-08-05 ENCOUNTER — APPOINTMENT (OUTPATIENT)
Dept: GENERAL RADIOLOGY | Facility: HOSPITAL | Age: 75
End: 2020-08-05

## 2020-08-05 LAB
ANION GAP SERPL CALCULATED.3IONS-SCNC: 10 MMOL/L (ref 5–15)
BASOPHILS # BLD AUTO: 0.1 10*3/MM3 (ref 0–0.2)
BASOPHILS NFR BLD AUTO: 1.3 % (ref 0–1.5)
BUN SERPL-MCNC: 21 MG/DL (ref 8–23)
BUN SERPL-MCNC: ABNORMAL MG/DL
BUN/CREAT SERPL: ABNORMAL
CALCIUM SPEC-SCNC: 8.8 MG/DL (ref 8.6–10.5)
CHLORIDE SERPL-SCNC: 96 MMOL/L (ref 98–107)
CO2 SERPL-SCNC: 34 MMOL/L (ref 22–29)
CREAT SERPL-MCNC: 1.21 MG/DL (ref 0.76–1.27)
DEPRECATED RDW RBC AUTO: 49.9 FL (ref 37–54)
EOSINOPHIL # BLD AUTO: 0.3 10*3/MM3 (ref 0–0.4)
EOSINOPHIL NFR BLD AUTO: 5.1 % (ref 0.3–6.2)
ERYTHROCYTE [DISTWIDTH] IN BLOOD BY AUTOMATED COUNT: 15.4 % (ref 12.3–15.4)
GFR SERPL CREATININE-BSD FRML MDRD: 59 ML/MIN/1.73
GLUCOSE BLDC GLUCOMTR-MCNC: 102 MG/DL (ref 70–105)
GLUCOSE BLDC GLUCOMTR-MCNC: 111 MG/DL (ref 70–105)
GLUCOSE BLDC GLUCOMTR-MCNC: 129 MG/DL (ref 70–105)
GLUCOSE BLDC GLUCOMTR-MCNC: 153 MG/DL (ref 70–105)
GLUCOSE SERPL-MCNC: 140 MG/DL (ref 65–99)
HCT VFR BLD AUTO: 41.6 % (ref 37.5–51)
HGB BLD-MCNC: 14.1 G/DL (ref 13–17.7)
LYMPHOCYTES # BLD AUTO: 0.8 10*3/MM3 (ref 0.7–3.1)
LYMPHOCYTES NFR BLD AUTO: 13.2 % (ref 19.6–45.3)
MAGNESIUM SERPL-MCNC: 2.6 MG/DL (ref 1.6–2.4)
MCH RBC QN AUTO: 31.1 PG (ref 26.6–33)
MCHC RBC AUTO-ENTMCNC: 33.9 G/DL (ref 31.5–35.7)
MCV RBC AUTO: 91.6 FL (ref 79–97)
MONOCYTES # BLD AUTO: 0.7 10*3/MM3 (ref 0.1–0.9)
MONOCYTES NFR BLD AUTO: 12.5 % (ref 5–12)
NEUTROPHILS NFR BLD AUTO: 3.9 10*3/MM3 (ref 1.7–7)
NEUTROPHILS NFR BLD AUTO: 67.9 % (ref 42.7–76)
NRBC BLD AUTO-RTO: 0.2 /100 WBC (ref 0–0.2)
PLATELET # BLD AUTO: 308 10*3/MM3 (ref 140–450)
PMV BLD AUTO: 8.1 FL (ref 6–12)
POTASSIUM SERPL-SCNC: 4.4 MMOL/L (ref 3.5–5.2)
RBC # BLD AUTO: 4.54 10*6/MM3 (ref 4.14–5.8)
SODIUM SERPL-SCNC: 140 MMOL/L (ref 136–145)
WBC # BLD AUTO: 5.8 10*3/MM3 (ref 3.4–10.8)

## 2020-08-05 PROCEDURE — 80048 BASIC METABOLIC PNL TOTAL CA: CPT | Performed by: FAMILY MEDICINE

## 2020-08-05 PROCEDURE — 25010000002 HYDROMORPHONE PER 4 MG: Performed by: FAMILY MEDICINE

## 2020-08-05 PROCEDURE — 99233 SBSQ HOSP IP/OBS HIGH 50: CPT | Performed by: NURSE PRACTITIONER

## 2020-08-05 PROCEDURE — 85025 COMPLETE CBC W/AUTO DIFF WBC: CPT | Performed by: FAMILY MEDICINE

## 2020-08-05 PROCEDURE — 25010000002 ENOXAPARIN PER 10 MG: Performed by: FAMILY MEDICINE

## 2020-08-05 PROCEDURE — 74018 RADEX ABDOMEN 1 VIEW: CPT

## 2020-08-05 PROCEDURE — 25010000002 DIPHENHYDRAMINE PER 50 MG: Performed by: NURSE PRACTITIONER

## 2020-08-05 PROCEDURE — 25010000002 ONDANSETRON PER 1 MG: Performed by: FAMILY MEDICINE

## 2020-08-05 PROCEDURE — 82962 GLUCOSE BLOOD TEST: CPT

## 2020-08-05 PROCEDURE — 25010000002 PIPERACILLIN SOD-TAZOBACTAM PER 1 G: Performed by: FAMILY MEDICINE

## 2020-08-05 PROCEDURE — 63710000001 INSULIN LISPRO (HUMAN) PER 5 UNITS: Performed by: FAMILY MEDICINE

## 2020-08-05 PROCEDURE — 25010000002 HYDROMORPHONE PER 4 MG: Performed by: NURSE PRACTITIONER

## 2020-08-05 PROCEDURE — 83735 ASSAY OF MAGNESIUM: CPT | Performed by: FAMILY MEDICINE

## 2020-08-05 RX ORDER — HYDROMORPHONE HCL 110MG/55ML
1 PATIENT CONTROLLED ANALGESIA SYRINGE INTRAVENOUS
Status: DISCONTINUED | OUTPATIENT
Start: 2020-08-05 | End: 2020-08-06

## 2020-08-05 RX ORDER — DIPHENHYDRAMINE HYDROCHLORIDE 50 MG/ML
25 INJECTION INTRAMUSCULAR; INTRAVENOUS EVERY 6 HOURS PRN
Status: DISCONTINUED | OUTPATIENT
Start: 2020-08-05 | End: 2020-08-08 | Stop reason: HOSPADM

## 2020-08-05 RX ORDER — HYDRALAZINE HYDROCHLORIDE 20 MG/ML
10 INJECTION INTRAMUSCULAR; INTRAVENOUS EVERY 6 HOURS PRN
Status: DISCONTINUED | OUTPATIENT
Start: 2020-08-05 | End: 2020-08-08 | Stop reason: HOSPADM

## 2020-08-05 RX ADMIN — HYDROMORPHONE HYDROCHLORIDE 1 MG: 2 INJECTION, SOLUTION INTRAMUSCULAR; INTRAVENOUS; SUBCUTANEOUS at 08:30

## 2020-08-05 RX ADMIN — HYDROMORPHONE HYDROCHLORIDE 1 MG: 2 INJECTION, SOLUTION INTRAMUSCULAR; INTRAVENOUS; SUBCUTANEOUS at 18:36

## 2020-08-05 RX ADMIN — METOPROLOL TARTRATE 2.5 MG: 5 INJECTION INTRAVENOUS at 02:05

## 2020-08-05 RX ADMIN — HYDROMORPHONE HYDROCHLORIDE 1 MG: 2 INJECTION, SOLUTION INTRAMUSCULAR; INTRAVENOUS; SUBCUTANEOUS at 15:41

## 2020-08-05 RX ADMIN — PIPERACILLIN AND TAZOBACTAM 3.38 G: 3; .375 INJECTION, POWDER, FOR SOLUTION INTRAVENOUS at 15:39

## 2020-08-05 RX ADMIN — DIPHENHYDRAMINE HYDROCHLORIDE 25 MG: 50 INJECTION, SOLUTION INTRAMUSCULAR; INTRAVENOUS at 21:49

## 2020-08-05 RX ADMIN — HYDROMORPHONE HYDROCHLORIDE 1 MG: 2 INJECTION, SOLUTION INTRAMUSCULAR; INTRAVENOUS; SUBCUTANEOUS at 06:25

## 2020-08-05 RX ADMIN — INSULIN LISPRO 2 UNITS: 100 INJECTION, SOLUTION INTRAVENOUS; SUBCUTANEOUS at 17:41

## 2020-08-05 RX ADMIN — METOPROLOL TARTRATE 2.5 MG: 5 INJECTION INTRAVENOUS at 12:13

## 2020-08-05 RX ADMIN — ONDANSETRON 4 MG: 2 INJECTION INTRAMUSCULAR; INTRAVENOUS at 05:43

## 2020-08-05 RX ADMIN — HYDROMORPHONE HYDROCHLORIDE 1 MG: 2 INJECTION, SOLUTION INTRAMUSCULAR; INTRAVENOUS; SUBCUTANEOUS at 10:30

## 2020-08-05 RX ADMIN — PIPERACILLIN AND TAZOBACTAM 3.38 G: 3; .375 INJECTION, POWDER, FOR SOLUTION INTRAVENOUS at 23:30

## 2020-08-05 RX ADMIN — HYDROMORPHONE HYDROCHLORIDE 1 MG: 2 INJECTION, SOLUTION INTRAMUSCULAR; INTRAVENOUS; SUBCUTANEOUS at 04:35

## 2020-08-05 RX ADMIN — HYDROMORPHONE HYDROCHLORIDE 1 MG: 2 INJECTION, SOLUTION INTRAMUSCULAR; INTRAVENOUS; SUBCUTANEOUS at 12:29

## 2020-08-05 RX ADMIN — SODIUM CHLORIDE, SODIUM LACTATE, POTASSIUM CHLORIDE, AND CALCIUM CHLORIDE 150 ML/HR: 600; 310; 30; 20 INJECTION, SOLUTION INTRAVENOUS at 05:43

## 2020-08-05 RX ADMIN — PIPERACILLIN AND TAZOBACTAM 3.38 G: 3; .375 INJECTION, POWDER, FOR SOLUTION INTRAVENOUS at 06:24

## 2020-08-05 RX ADMIN — METOPROLOL TARTRATE 2.5 MG: 5 INJECTION INTRAVENOUS at 23:30

## 2020-08-05 RX ADMIN — METOPROLOL TARTRATE 2.5 MG: 5 INJECTION INTRAVENOUS at 17:42

## 2020-08-05 RX ADMIN — HYDROMORPHONE HYDROCHLORIDE 1 MG: 2 INJECTION, SOLUTION INTRAMUSCULAR; INTRAVENOUS; SUBCUTANEOUS at 21:49

## 2020-08-05 RX ADMIN — ENOXAPARIN SODIUM 40 MG: 40 INJECTION, SOLUTION INTRAVENOUS; SUBCUTANEOUS at 15:39

## 2020-08-05 RX ADMIN — METOPROLOL TARTRATE 2.5 MG: 5 INJECTION INTRAVENOUS at 05:43

## 2020-08-05 RX ADMIN — ASPIRIN 300 MG: 300 SUPPOSITORY RECTAL at 08:29

## 2020-08-05 NOTE — PROGRESS NOTES
"     LOS: 1 day   Patient Care Team:  Yusuf Jones MD as PCP - General  PhiJoshua MD as Consulting Physician (Cardiology)  Deam, Moise Kathleen MD as Consulting Physician (Cardiac Electrophysiology)    Chief Complaint:  abd pain    Subjective   \"I feel a little better since they got this tube in \"  Interval History:     Patient Complaints:Abd pain, insomnia  Patient Denies:  N/V, diarrhea, chest pain, cough, SOA, HA  History taken from: patient    Review of Systems:   All neg except noted above  Objective   Pt resting in bed, appears comfortable  Vital Signs  Temp:  [97.4 °F (36.3 °C)-98.9 °F (37.2 °C)] 98.9 °F (37.2 °C)  Heart Rate:  [69-79] 72  Resp:  [16-19] 19  BP: (146-176)/(69-98) 169/98    Physical Exam:     General Appearance:    Alert, cooperative, in no acute distress   Head:    Normocephalic, without obvious abnormality, atraumatic   Eyes:            Lids and lashes normal, conjunctivae and sclerae normal,   Ears:    Ears appear intact with no abnormalities noted   Throat:   No oral lesions, no thrush, oral mucosa moist   Neck:   No adenopathy, supple, trachea midline,   Back:     No kyphosis present, no scoliosis present, no skin lesions,      erythema or scars, no tenderness to percussion or                   palpation,   range of motion normal   Lungs:     Clear to auscultation,respirations regular, even and                  unlabored    Heart:    Regular rhythm and normal rate, normal S1 and S2, no            murmur, no gallop, no rub, no click   Chest Wall:    No abnormalities observed   Abdomen:     Normal bowel sounds, soft large hernia masses, no organomegaly, soft, mild tenderness   Rectal:     Deferred   Extremities:   Moves all extremities well, no edema, no cyanosis, no             redness   Pulses:   Pulses palpable and equal bilaterally   Skin:   No bleeding, bruising or rash   Lymph nodes:   No palpable adenopathy   Neurologic:   Cranial nerves 2 - 12 grossly intact, " sensation intact        Results Review:     I reviewed the patient's new clinical results.  I reviewed the patient's new imaging results and agree with the interpretation.    Medication Review:   Scheduled Meds:  aspirin 300 mg Rectal Daily   enoxaparin 40 mg Subcutaneous Q24H   insulin lispro 0-7 Units Subcutaneous Q6H   magnesium sulfate 2 g Intravenous Once   metoprolol tartrate 2.5 mg Intravenous Q6H   piperacillin-tazobactam 3.375 g Intravenous Q8H   sodium chloride 10 mL Intravenous Q12H     Continuous Infusions:  lactated ringers 150 mL/hr Last Rate: 150 mL/hr (08/05/20 0543)     PRN Meds:.dextrose  •  dextrose  •  glucagon (human recombinant)  •  hydrALAZINE  •  HYDROmorphone  •  insulin lispro **AND** insulin lispro  •  magnesium sulfate **OR** magnesium sulfate **OR** magnesium sulfate  •  ondansetron **OR** ondansetron  •  potassium chloride  •  potassium phosphate infusion greater than 15 mMoles **OR** potassium phosphate infusion greater than 15 mMoles **OR** potassium phosphate **OR** sodium phosphate IVPB **OR** sodium phosphate IVPB **OR** sodium phosphate IVPB  •  sodium chloride    Labs:  Lab Results (last 24 hours)     Procedure Component Value Units Date/Time    BUN [[id number]]  (Normal) Collected:  08/05/20 0241    Specimen:  Blood Updated:  08/05/20 0726     BUN 21 mg/dL     POC Glucose Once [[id number]]  (Abnormal) Collected:  08/05/20 0631    Specimen:  Blood Updated:  08/05/20 0648     Glucose 129 mg/dL      Comment: Serial Number: [id number]:  [id number]       Magnesium [[id number]]  (Abnormal) Collected:  08/05/20 0241    Specimen:  Blood Updated:  08/05/20 0439     Magnesium 2.6 mg/dL     Basic Metabolic Panel [[id number]]  (Abnormal) Collected:  08/05/20 0241    Specimen:  Blood Updated:  08/05/20 0427     Glucose 140 mg/dL      BUN --     Comment: Testing performed by alternate method        Creatinine 1.21 mg/dL      Sodium 140 mmol/L      Potassium 4.4 mmol/L      Comment: Specimen  hemolyzed.  Results may be affected.        Chloride 96 mmol/L      CO2 34.0 mmol/L      Calcium 8.8 mg/dL      eGFR Non African Amer 59 mL/min/1.73      BUN/Creatinine Ratio --     Comment: Testing not performed        Anion Gap 10.0 mmol/L     Narrative:       GFR Normal >60  Chronic Kidney Disease <60  Kidney Failure <15      CBC & Differential [[id number]] Collected:  08/05/20 0241    Specimen:  Blood Updated:  08/05/20 0338    Narrative:       The following orders were created for panel order CBC & Differential.  Procedure                               Abnormality         Status                     ---------                               -----------         ------                     CBC Auto Differential[[id number]]        Abnormal            Final result                 Please view results for these tests on the individual orders.    CBC Auto Differential [[id number]]  (Abnormal) Collected:  08/05/20 0241    Specimen:  Blood Updated:  08/05/20 0338     WBC 5.80 10*3/mm3      RBC 4.54 10*6/mm3      Hemoglobin 14.1 g/dL      Hematocrit 41.6 %      MCV 91.6 fL      MCH 31.1 pg      MCHC 33.9 g/dL      RDW 15.4 %      RDW-SD 49.9 fl      MPV 8.1 fL      Platelets 308 10*3/mm3      Neutrophil % 67.9 %      Lymphocyte % 13.2 %      Monocyte % 12.5 %      Eosinophil % 5.1 %      Basophil % 1.3 %      Neutrophils, Absolute 3.90 10*3/mm3      Lymphocytes, Absolute 0.80 10*3/mm3      Monocytes, Absolute 0.70 10*3/mm3      Eosinophils, Absolute 0.30 10*3/mm3      Basophils, Absolute 0.10 10*3/mm3      nRBC 0.2 /100 WBC     POC Glucose Once [[id number]]  (Abnormal) Collected:  08/04/20 2136    Specimen:  Blood Updated:  08/04/20 2137     Glucose 123 mg/dL      Comment: Serial Number: [id number]:  [id number]       POC Glucose Once [[id number]]  (Abnormal) Collected:  08/04/20 1629    Specimen:  Blood Updated:  08/04/20 1633     Glucose 130 mg/dL      Comment: Serial Number: [id number]:  [id number]       POC Glucose  Once [[id number]]  (Abnormal) Collected:  08/04/20 1247    Specimen:  Blood Updated:  08/04/20 1249     Glucose 139 mg/dL      Comment: Serial Number: [id number]:  [id number]       Urinalysis With Culture If Indicated - Urine, Clean Catch [[id number]]  (Abnormal) Collected:  08/04/20 1150    Specimen:  Urine, Clean Catch Updated:  08/04/20 1226     Color, UA Yellow     Appearance, UA Clear     pH, UA 5.5     Specific Gravity, UA 1.052     Glucose, UA Negative     Ketones, UA Negative     Bilirubin, UA Negative     Blood, UA Negative     Protein, UA Negative     Leuk Esterase, UA Negative     Nitrite, UA Negative     Urobilinogen, UA 0.2 E.U./dL    Narrative:       Urine microscopic not indicated.    Hemoglobin A1c [[id number]]  (Normal) Collected:  08/04/20 0308    Specimen:  Blood Updated:  08/04/20 1046     Hemoglobin A1C 5.6 %     Narrative:       Hemoglobin A1C Reference Range:    <5.7 %        Normal  5.7-6.4 %     Increased risk for diabetes  > 6.4 %        Diabetes       These guidelines have been recommended by the American Diabetic Association for Hgb A1c.      The following 2010 guidelines have been recommended by the American Diabetes Association for Hemoglobin A1c.    HBA1c 5.7-6.4% Increased risk for future diabetes (pre-diabetes)  HBA1c     >6.4% Diabetes      Magnesium [[id number]]  (Normal) Collected:  08/04/20 0308    Specimen:  Blood Updated:  08/04/20 0913     Magnesium 1.6 mg/dL     Phosphorus [[id number]]  (Normal) Collected:  08/04/20 0308    Specimen:  Blood Updated:  08/04/20 0913     Phosphorus 3.9 mg/dL            Assessment/Plan       SBO (small bowel obstruction) (CMS/HCC)  Chronic pneumatosis in multiple loops of dilated small bowel  Type 2 diabetes  Essential hypertension  Stage III chronic kidney disease  Mixed simple and mucopurulent chronic bronchitis  Monoclonal paraproteinemia  Morbid obesity  Narcotic dependency  Obstructive sleep apnea  Dyslipidemia  Liver cirrhosis  Paroxysmal  atrial fibrillation  Polyneuropathy  Peripheral vascular disease         Improving with NG decompression and NPO though using high amounts IV dilaudid-will decrease slightly. Only safe med to give to help with sleep since he is NPO is Benedryl-will order    Sasha Patel, KOURTNEY  08/05/20  07:58

## 2020-08-05 NOTE — PROGRESS NOTES
[facility]        LOS:  LOS: 1 day   Patient Name: Ro Nathan  Age/Sex: 74 y.o. male  : 1945  MRN: [id number]    Day of Service: 20   Length of Stay: 1  Encounter Provider: KOURTNEY Evangelista  Place of Service: [facility] CARDIOLOGY  Patient Care Team:  Yusuf Jones MD as PCP - General  [facility], Joshua Nuñez MD as Consulting Physician (Cardiology)  Deam, Moise Kathleen MD as Consulting Physician (Cardiac Electrophysiology)    Subjective:     Chief Complaint:  F/U pre-operative cardiac evaluation, CAD, Afib    Subjective:   Patient denies any angina.  He denies dyspnea but reports cough and throat irritation from NG tube.  He is eating ice chips and hopeful to get to try a diet today.    Current Medications:   Scheduled Meds:    aspirin 300 mg Rectal Daily   enoxaparin 40 mg Subcutaneous Q24H   insulin lispro 0-7 Units Subcutaneous Q6H   magnesium sulfate 2 g Intravenous Once   metoprolol tartrate 2.5 mg Intravenous Q6H   piperacillin-tazobactam 3.375 g Intravenous Q8H   sodium chloride 10 mL Intravenous Q12H     Continuous Infusions:    lactated ringers 150 mL/hr Last Rate: 150 mL/hr (20 0543)       Allergies:  Allergies   Allergen Reactions   • Haloperidol Hives   • Morphine Itching   • Pantoprazole Other (See Comments)     Feels sick after receiving       ROS    Objective:     Temp:  [97.4 °F (36.3 °C)-98.9 °F (37.2 °C)] 98.9 °F (37.2 °C)  Heart Rate:  [69-79] 72  Resp:  [16-19] 19  BP: (146-176)/(69-98) 169/98     Intake/Output Summary (Last 24 hours) at 2020 0758  Last data filed at 2020 0744  Gross per 24 hour   Intake 3388 ml   Output 5275 ml   Net -1887 ml     Body mass index is 34.62 kg/m².      20  0247 20  0620 20  0447   Weight: 119 kg (262 lb 9.1 oz) 118 kg (260 lb 5.8 oz) 116 kg (255 lb 4.7 oz)         Physical Exam:  Neuro:  CV:  Resp:  GI:  Ext:  Tele: AAOx3  S1S2 RRR, no  murmur  Non-labored, coarse inspiratory  BS+, abd distended  Pedal pulses palp, trace non-pitting BLE edema  Vpacing                                                   Lab Review:   Results from last 7 days   Lab Units 08/05/20  0241 08/04/20  0308   SODIUM mmol/L 140 137   POTASSIUM mmol/L 4.4 3.9   CHLORIDE mmol/L 96* 99   CO2 mmol/L 34.0* 24.0   BUN  21 16   CREATININE mg/dL 1.21 1.17   GLUCOSE mg/dL 140* 176*   CALCIUM mg/dL 8.8 9.1   AST (SGOT) U/L  --  15   ALT (SGPT) U/L  --  8         Results from last 7 days   Lab Units 08/05/20  0241 08/04/20  0308   WBC 10*3/mm3 5.80 9.60   HEMOGLOBIN g/dL 14.1 14.8   HEMATOCRIT % 41.6 42.7   PLATELETS 10*3/mm3 308 331     Results from last 7 days   Lab Units 08/04/20  0308   INR  1.03   APTT seconds 25.0     Results from last 7 days   Lab Units 08/05/20  0241 08/04/20  0308   MAGNESIUM mg/dL 2.6* 1.6           Invalid input(s): LDLCALC            Recent Radiology:  Imaging Results (Most Recent)     Procedure Component Value Units Date/Time    XR Abdomen KUB [[id number]] Collected:  08/04/20 2311     Updated:  08/05/20 0113    Narrative:       Frontal abdomen    CLINICAL INDICATION: NG tube.    COMPARISON: None.    FINDINGS: Enteric tube in the stomach. Gas-distended loops of bowel in the upper abdomen. No intraperitoneal free air. No abnormal mass or calcification. Lung bases are clear. Osseous structures are unremarkable.      Impression:       1. Enteric tube in the gastric fundus.  2. Gaseous distended bowel in the upper abdomen.    Electronically signed by:  Joshua Whaley M.D.    8/4/2020 11:12 PM    CT Abdomen Pelvis With Contrast [[id number]] Collected:  08/04/20 0251     Updated:  08/04/20 0500    Narrative:       EXAMINATION: CT SCAN OF THE ABDOMEN AND PELVIS WITH INTRAVENOUS CONTRAST    DATE OF EXAM: 8/4/2020 4:16 AM    HISTORY: Abdominal pain, recurrent bowel obstructions.    COMPARISON: 6/8/2020.    TECHNIQUE: CT examination of the abdomen and pelvis was  performed following the intravenous administration of 100 mL  Isovue-370. CT dose lowering techniques were used, to include: automated exposure control, adjustment for patient size, and/or use of   iterative reconstruction.    FINDINGS:    ABDOMEN/PELVIS:    Lower Chest: Lung bases are clear. Cardiac pacer    Liver: Normal.     Gallbladder/Biliary: Cholecystectomy    Pancreas: Normal.     Spleen: Normal.     Adrenal Glands: Normal.     Kidneys: Normal.     GI Tract: There is a large anterior abdominal wall hernia with a high-grade small bowel obstruction occurring in the right side of the hernia sac against the abdominal wall. The small bowel proximal to this site is dilated and filled with fluid. There is   some chronic pneumatosis in the bowel in the anterior and right side of the hernia sac as well as traces of free air. Findings appear generally similar to prior imaging.    Mesentery/Peritoneum: Normal.    Vasculature: Normal.     Lymph Nodes: Normal.     Abdominal Wall: Normal.     Bladder: Normal.     Reproductive: Normal.     Musculoskeletal: Normal.        Impression:         1.  Findings appear similar to the patient's prior CT from June 8, 2020.  2.  Large anterior abdominal wall hernia with a high-grade bowel obstruction occurring in the distal small bowel along the right side of the hernia sac as the bowel reenters the abdominal cavity.  3.  Chronic, fairly extensive pneumatosis involving multiple loops of dilated small bowel in the hernia sac as well as a few small locules of free air. These findings were all present on prior imaging. Surgical consultation is recommended.    Electronically signed by:  Zechariah Handley M.D.    8/4/2020 2:59 AM          ECHOCARDIOGRAM:    Results for orders placed during the hospital encounter of 12/03/19   Adult Transthoracic Echo Complete W/ Cont if Necessary Per Protocol    Narrative · Mild mitral valve regurgitation is present  · Mild dilation of the sinuses of  "Valsalva is present.  · Left ventricular systolic function is hyperdynamic (EF > 70).  · Left atrial cavity size is severely dilated.  · Right ventricular cavity is mildly dilated.  · Left ventricular diastolic dysfunction (grade II) consistent with   pseudonormalization.     Overall normal LV systolic function and size  Normal RV function, mild RV enlargement  Severe left atrial enlargement  Diastolic dysfunction indeterminate by criteria  No masses  No effusion seen  EF hyperdynamic greater than 70%  No significant valvulopathy seen           I reviewed the patient's new clinical results.    EKG:      Assessment:       SBO (small bowel obstruction) (CMS/HCC)    1) SBO  - NPO  - NG Tube decompression  - surgery managing non-operatively for now     2) Pre-operative Cardiac Evaluation  - last cath 12/2019 as below  - 2D echo 12/2019 showed EF > 70%, grade 2 diastolic dysfunction, mild MR.  - EKG is V-paced / non-diagnostic; underlying afib  - may interrupt DAPT if surgery given PCI with MICHAEL > 6 months ago  - as per Dr. Munson \"No contraindication to proceeding forward with surgery from a cardiovascular standpoint.\"     3) CAD s/p PCI with MICHAEL to LAD 12/2019   - residual borderline disease of the LCx being treated medically.  - on dual antiplatelet therapy with aspirin and effient, BB, high dose statin at home  - continue on aspirin suppository while NPO     4) PAF / PPM  - rate controlled with BB, CCB, and PO dig at home  - not on anticoagulation given hx GIB  - continue on IV BB q6h while NPO      5) COPD     6) stage III CKD     7) HTN  - IV PRN hydralazine while NPO     8) HLD  - on statin at home       Plan:   As per Dr. Munson \"no contraindication to proceeding forward with surgery from a cardiovascular standpoint.\"  Patient may also interrupt DAPT given PCI > 6 months ago.  As per surgery, patient trying non-operative management.  Will continue aspirin suppository, q6h IV BB, and PRN IV hydralazine while " NPO/NG tube decompression.  Tele shows V-pacing with underlying rate controlled afib.  No c/o angina.  Patient appears compensated.  Will follow.      Electronically signed by KOURTNEY Evaneglista, 08/05/20, 8:02 AM.

## 2020-08-05 NOTE — PROGRESS NOTES
Continued Stay Note  [facility]     Patient Name: Ro Nathan  MRN: [id number]  Today's Date: 8/5/2020    Admit Date: 8/4/2020    Discharge Plan     Row Name 08/05/20 1149       Plan    Plan  Anticipate discharge home with spouse. Pending clinical course, may need HH.     Patient/Family in Agreement with Plan  yes    Plan Comments  Barriers to discharge: Patient currently with an NGT at this time.              Anna [place] [address]   [phone]  office  [phone]  fax  [email or web address]

## 2020-08-05 NOTE — PROGRESS NOTES
LOS: 1 day   Patient Care Team:  Yusuf Jones MD as PCP - General  Joshua Yip MD as Consulting Physician (Cardiology)  Deam, Moise Kathleen MD as Consulting Physician (Cardiac Electrophysiology)    Chief Complaint: 700 ng tube output, abdominal distention, SBO   Interval History:   Patient Denies: Vomiting/ nausea/ abdominal pain   History taken from: Patient      Objective     Vital Signs  Temp:  [97.4 °F (36.3 °C)-98.9 °F (37.2 °C)] 98.3 °F (36.8 °C)  Heart Rate:  [69-72] 71  Resp:  [17-20] 20  BP: (139-169)/(69-98) 139/78      Physical Exam:   Heart: RR   Lungs:  CTA B   Abd: moderately distended but soft to touch, bowel sounds q 4 quadrants, ng tube brown-green 700 ml output, pt reported passing gas this morning    Ext:  No clubbing, cyanosis, edema     Results Review:     I reviewed the patient's new clinical results.    Lab Results (last 24 hours)     Procedure Component Value Units Date/Time    POC Glucose Once [[id number]]  (Abnormal) Collected:  08/05/20 1153    Specimen:  Blood Updated:  08/05/20 1202     Glucose 111 mg/dL      Comment: Serial Number: [id number]:  [id number]       BUN [[id number]]  (Normal) Collected:  08/05/20 0241    Specimen:  Blood Updated:  08/05/20 0726     BUN 21 mg/dL     POC Glucose Once [[id number]]  (Abnormal) Collected:  08/05/20 0631    Specimen:  Blood Updated:  08/05/20 0648     Glucose 129 mg/dL      Comment: Serial Number: [id number]:  [id number]       Magnesium [[id number]]  (Abnormal) Collected:  08/05/20 0241    Specimen:  Blood Updated:  08/05/20 0439     Magnesium 2.6 mg/dL     Basic Metabolic Panel [[id number]]  (Abnormal) Collected:  08/05/20 0241    Specimen:  Blood Updated:  08/05/20 0427     Glucose 140 mg/dL      BUN --     Comment: Testing performed by alternate method        Creatinine 1.21 mg/dL      Sodium 140 mmol/L      Potassium 4.4 mmol/L      Comment: Specimen hemolyzed.  Results may be affected.        Chloride 96 mmol/L       CO2 34.0 mmol/L      Calcium 8.8 mg/dL      eGFR Non African Amer 59 mL/min/1.73      BUN/Creatinine Ratio --     Comment: Testing not performed        Anion Gap 10.0 mmol/L     Narrative:       GFR Normal >60  Chronic Kidney Disease <60  Kidney Failure <15      CBC & Differential [[id number]] Collected:  08/05/20 0241    Specimen:  Blood Updated:  08/05/20 0338    Narrative:       The following orders were created for panel order CBC & Differential.  Procedure                               Abnormality         Status                     ---------                               -----------         ------                     CBC Auto Differential[[id number]]        Abnormal            Final result                 Please view results for these tests on the individual orders.    CBC Auto Differential [[id number]]  (Abnormal) Collected:  08/05/20 0241    Specimen:  Blood Updated:  08/05/20 0338     WBC 5.80 10*3/mm3      RBC 4.54 10*6/mm3      Hemoglobin 14.1 g/dL      Hematocrit 41.6 %      MCV 91.6 fL      MCH 31.1 pg      MCHC 33.9 g/dL      RDW 15.4 %      RDW-SD 49.9 fl      MPV 8.1 fL      Platelets 308 10*3/mm3      Neutrophil % 67.9 %      Lymphocyte % 13.2 %      Monocyte % 12.5 %      Eosinophil % 5.1 %      Basophil % 1.3 %      Neutrophils, Absolute 3.90 10*3/mm3      Lymphocytes, Absolute 0.80 10*3/mm3      Monocytes, Absolute 0.70 10*3/mm3      Eosinophils, Absolute 0.30 10*3/mm3      Basophils, Absolute 0.10 10*3/mm3      nRBC 0.2 /100 WBC     POC Glucose Once [[id number]]  (Abnormal) Collected:  08/04/20 2136    Specimen:  Blood Updated:  08/04/20 2137     Glucose 123 mg/dL      Comment: Serial Number: [id number]:  [id number]       POC Glucose Once [[id number]]  (Abnormal) Collected:  08/04/20 1629    Specimen:  Blood Updated:  08/04/20 1633     Glucose 130 mg/dL      Comment: Serial Number: [id number]:  [id number]             Assessment/Plan:    SBO (small bowel obstruction) (CMS/HCC)      74 y.o.  male with hx of large ventral hernia. He has recurrent bowel obstructions that have been treated in the past with NG tube decompression. Today patient is doing well. States he was able to pass gas this morning. No BM yet. Denies nausea/vomiting/ or abdominal pain. Only complaint is wanting something to drink/ a popsicle.  Ng tube does have significant output of 700 ml. Labs and vitals reviewed and stable.      Ok for patient to have a popsicle every 3 hours as long as he is not having any nausea/vomiting. Will try and advance diet soon if he is tolerating these well. Continue with NG tube for now for decompression. Will continue to follow patient.     KOURTNEY Randhawa  [facility] Bariatrics and General Surgery     08/05/20

## 2020-08-06 LAB
ALBUMIN SERPL-MCNC: 3.4 G/DL (ref 3.5–5.2)
ALBUMIN/GLOB SERPL: 1.3 G/DL
ALP SERPL-CCNC: 133 U/L (ref 39–117)
ALT SERPL W P-5'-P-CCNC: 7 U/L (ref 1–41)
ANION GAP SERPL CALCULATED.3IONS-SCNC: 10 MMOL/L (ref 5–15)
AST SERPL-CCNC: 20 U/L (ref 1–40)
BASOPHILS # BLD AUTO: 0.1 10*3/MM3 (ref 0–0.2)
BASOPHILS NFR BLD AUTO: 1.4 % (ref 0–1.5)
BILIRUB SERPL-MCNC: 1 MG/DL (ref 0–1.2)
BUN SERPL-MCNC: 20 MG/DL (ref 8–23)
BUN SERPL-MCNC: ABNORMAL MG/DL
BUN/CREAT SERPL: ABNORMAL
CALCIUM SPEC-SCNC: 8.9 MG/DL (ref 8.6–10.5)
CHLORIDE SERPL-SCNC: 95 MMOL/L (ref 98–107)
CO2 SERPL-SCNC: 36 MMOL/L (ref 22–29)
CREAT SERPL-MCNC: 1.19 MG/DL (ref 0.76–1.27)
DEPRECATED RDW RBC AUTO: 49 FL (ref 37–54)
EOSINOPHIL # BLD AUTO: 0.4 10*3/MM3 (ref 0–0.4)
EOSINOPHIL NFR BLD AUTO: 8 % (ref 0.3–6.2)
ERYTHROCYTE [DISTWIDTH] IN BLOOD BY AUTOMATED COUNT: 15.2 % (ref 12.3–15.4)
GFR SERPL CREATININE-BSD FRML MDRD: 60 ML/MIN/1.73
GLOBULIN UR ELPH-MCNC: 2.6 GM/DL
GLUCOSE BLDC GLUCOMTR-MCNC: 102 MG/DL (ref 70–105)
GLUCOSE BLDC GLUCOMTR-MCNC: 121 MG/DL (ref 70–105)
GLUCOSE BLDC GLUCOMTR-MCNC: 122 MG/DL (ref 70–105)
GLUCOSE BLDC GLUCOMTR-MCNC: 93 MG/DL (ref 70–105)
GLUCOSE SERPL-MCNC: 115 MG/DL (ref 65–99)
HCT VFR BLD AUTO: 38.7 % (ref 37.5–51)
HGB BLD-MCNC: 13.1 G/DL (ref 13–17.7)
LYMPHOCYTES # BLD AUTO: 1.1 10*3/MM3 (ref 0.7–3.1)
LYMPHOCYTES NFR BLD AUTO: 20.9 % (ref 19.6–45.3)
MCH RBC QN AUTO: 30.5 PG (ref 26.6–33)
MCHC RBC AUTO-ENTMCNC: 33.9 G/DL (ref 31.5–35.7)
MCV RBC AUTO: 90.1 FL (ref 79–97)
MONOCYTES # BLD AUTO: 0.6 10*3/MM3 (ref 0.1–0.9)
MONOCYTES NFR BLD AUTO: 12.4 % (ref 5–12)
NEUTROPHILS NFR BLD AUTO: 2.9 10*3/MM3 (ref 1.7–7)
NEUTROPHILS NFR BLD AUTO: 57.3 % (ref 42.7–76)
NRBC BLD AUTO-RTO: 0 /100 WBC (ref 0–0.2)
PLATELET # BLD AUTO: 296 10*3/MM3 (ref 140–450)
PMV BLD AUTO: 7.5 FL (ref 6–12)
POTASSIUM SERPL-SCNC: 4.3 MMOL/L (ref 3.5–5.2)
PROT SERPL-MCNC: 6 G/DL (ref 6–8.5)
RBC # BLD AUTO: 4.3 10*6/MM3 (ref 4.14–5.8)
SODIUM SERPL-SCNC: 141 MMOL/L (ref 136–145)
WBC # BLD AUTO: 5 10*3/MM3 (ref 3.4–10.8)

## 2020-08-06 PROCEDURE — 25010000002 ENOXAPARIN PER 10 MG: Performed by: FAMILY MEDICINE

## 2020-08-06 PROCEDURE — 82962 GLUCOSE BLOOD TEST: CPT

## 2020-08-06 PROCEDURE — 99232 SBSQ HOSP IP/OBS MODERATE 35: CPT | Performed by: SURGERY

## 2020-08-06 PROCEDURE — 25010000002 ONDANSETRON PER 1 MG: Performed by: FAMILY MEDICINE

## 2020-08-06 PROCEDURE — 25010000002 HYDROMORPHONE PER 4 MG: Performed by: SURGERY

## 2020-08-06 PROCEDURE — 80053 COMPREHEN METABOLIC PANEL: CPT | Performed by: NURSE PRACTITIONER

## 2020-08-06 PROCEDURE — 85025 COMPLETE CBC W/AUTO DIFF WBC: CPT | Performed by: FAMILY MEDICINE

## 2020-08-06 PROCEDURE — 25010000002 HYDROMORPHONE PER 4 MG: Performed by: NURSE PRACTITIONER

## 2020-08-06 PROCEDURE — 25010000002 PIPERACILLIN SOD-TAZOBACTAM PER 1 G: Performed by: FAMILY MEDICINE

## 2020-08-06 PROCEDURE — 97161 PT EVAL LOW COMPLEX 20 MIN: CPT

## 2020-08-06 RX ORDER — HYDROMORPHONE HCL 110MG/55ML
1 PATIENT CONTROLLED ANALGESIA SYRINGE INTRAVENOUS EVERY 4 HOURS PRN
Status: DISCONTINUED | OUTPATIENT
Start: 2020-08-06 | End: 2020-08-06

## 2020-08-06 RX ORDER — HYDROMORPHONE HCL 110MG/55ML
0.5 PATIENT CONTROLLED ANALGESIA SYRINGE INTRAVENOUS EVERY 6 HOURS PRN
Status: DISCONTINUED | OUTPATIENT
Start: 2020-08-06 | End: 2020-08-08 | Stop reason: HOSPADM

## 2020-08-06 RX ADMIN — METOPROLOL TARTRATE 2.5 MG: 5 INJECTION INTRAVENOUS at 18:33

## 2020-08-06 RX ADMIN — PIPERACILLIN AND TAZOBACTAM 3.38 G: 3; .375 INJECTION, POWDER, FOR SOLUTION INTRAVENOUS at 14:28

## 2020-08-06 RX ADMIN — HYDROMORPHONE HYDROCHLORIDE 0.5 MG: 2 INJECTION, SOLUTION INTRAMUSCULAR; INTRAVENOUS; SUBCUTANEOUS at 20:49

## 2020-08-06 RX ADMIN — METOPROLOL TARTRATE 2.5 MG: 5 INJECTION INTRAVENOUS at 06:02

## 2020-08-06 RX ADMIN — HYDROMORPHONE HYDROCHLORIDE 1 MG: 2 INJECTION, SOLUTION INTRAMUSCULAR; INTRAVENOUS; SUBCUTANEOUS at 04:31

## 2020-08-06 RX ADMIN — PIPERACILLIN AND TAZOBACTAM 3.38 G: 3; .375 INJECTION, POWDER, FOR SOLUTION INTRAVENOUS at 08:25

## 2020-08-06 RX ADMIN — ONDANSETRON 4 MG: 2 INJECTION INTRAMUSCULAR; INTRAVENOUS at 08:25

## 2020-08-06 RX ADMIN — Medication 10 ML: at 20:49

## 2020-08-06 RX ADMIN — HYDROMORPHONE HYDROCHLORIDE 1 MG: 2 INJECTION, SOLUTION INTRAMUSCULAR; INTRAVENOUS; SUBCUTANEOUS at 08:26

## 2020-08-06 RX ADMIN — ASPIRIN 300 MG: 300 SUPPOSITORY RECTAL at 08:25

## 2020-08-06 RX ADMIN — METOPROLOL TARTRATE 2.5 MG: 5 INJECTION INTRAVENOUS at 12:30

## 2020-08-06 RX ADMIN — HYDROMORPHONE HYDROCHLORIDE 0.5 MG: 2 INJECTION, SOLUTION INTRAMUSCULAR; INTRAVENOUS; SUBCUTANEOUS at 14:28

## 2020-08-06 RX ADMIN — HYDROMORPHONE HYDROCHLORIDE 1 MG: 2 INJECTION, SOLUTION INTRAMUSCULAR; INTRAVENOUS; SUBCUTANEOUS at 01:21

## 2020-08-06 RX ADMIN — ENOXAPARIN SODIUM 40 MG: 40 INJECTION, SOLUTION INTRAVENOUS; SUBCUTANEOUS at 16:00

## 2020-08-06 RX ADMIN — Medication 10 ML: at 08:26

## 2020-08-06 NOTE — PROGRESS NOTES
"     LOS: 2 days   Patient Care Team:  Yusuf Jones MD as PCP - General  Joshua Yip MD as Consulting Physician (Cardiology)  Deam, Moise Kathleen MD as Consulting Physician (Cardiac Electrophysiology)    Chief Complaint:  abd pain    Subjective   \"I feel a little better since they got this tube in \"  Interval History:     Patient Complaints:Abd pain, insomnia  Patient Denies:  N/V, diarrhea, chest pain, cough, SOA, HA  History taken from: patient    Review of Systems:   All neg except noted above  Objective   Pt resting in bed, appears comfortable  Vital Signs  Temp:  [98.2 °F (36.8 °C)-98.5 °F (36.9 °C)] 98.4 °F (36.9 °C)  Heart Rate:  [69-73] 69  Resp:  [18-20] 20  BP: (139-160)/(73-81) 160/75    Physical Exam:     General Appearance:    Alert, cooperative, in no acute distress   Head:    Normocephalic, without obvious abnormality, atraumatic   Eyes:            Lids and lashes normal, conjunctivae and sclerae normal,   Ears:    Ears appear intact with no abnormalities noted   Throat:   No oral lesions, no thrush, oral mucosa moist   Neck:   No adenopathy, supple, trachea midline,   Back:     No kyphosis present, no scoliosis present, no skin lesions,      erythema or scars, no tenderness to percussion or                   palpation,   range of motion normal   Lungs:     Clear to auscultation,respirations regular, even and                  unlabored    Heart:    Regular rhythm and normal rate, normal S1 and S2, no            murmur, no gallop, no rub, no click   Chest Wall:    No abnormalities observed   Abdomen:     Normal bowel sounds, soft large hernia masses, no organomegaly, soft, mild tenderness   Rectal:     Deferred   Extremities:   Moves all extremities well, no edema, no cyanosis, no             redness   Pulses:   Pulses palpable and equal bilaterally   Skin:   No bleeding, bruising or rash   Lymph nodes:   No palpable adenopathy   Neurologic:   Cranial nerves 2 - 12 grossly intact, " sensation intact        Results Review:     I reviewed the patient's new clinical results.  I reviewed the patient's new imaging results and agree with the interpretation.    Medication Review:   Scheduled Meds:    aspirin 300 mg Rectal Daily   enoxaparin 40 mg Subcutaneous Q24H   insulin lispro 0-7 Units Subcutaneous Q6H   magnesium sulfate 2 g Intravenous Once   metoprolol tartrate 2.5 mg Intravenous Q6H   piperacillin-tazobactam 3.375 g Intravenous Q8H   sodium chloride 10 mL Intravenous Q12H     Continuous Infusions:    lactated ringers 150 mL/hr Last Rate: 150 mL/hr (08/05/20 0543)     PRN Meds:.dextrose  •  dextrose  •  diphenhydrAMINE  •  glucagon (human recombinant)  •  hydrALAZINE  •  HYDROmorphone  •  insulin lispro **AND** insulin lispro  •  magnesium sulfate **OR** magnesium sulfate **OR** magnesium sulfate  •  ondansetron **OR** ondansetron  •  potassium chloride  •  potassium phosphate infusion greater than 15 mMoles **OR** potassium phosphate infusion greater than 15 mMoles **OR** potassium phosphate **OR** sodium phosphate IVPB **OR** sodium phosphate IVPB **OR** sodium phosphate IVPB  •  sodium chloride    Labs:  Lab Results (last 24 hours)     Procedure Component Value Units Date/Time    BUN [[id number]]  (Normal) Collected:  08/06/20 0259    Specimen:  Blood Updated:  08/06/20 0729     BUN 20 mg/dL     POC Glucose Once [[id number]]  (Normal) Collected:  08/06/20 0615    Specimen:  Blood Updated:  08/06/20 0616     Glucose 102 mg/dL      Comment: Serial Number: [id number]:  [id number]       Comprehensive Metabolic Panel [[id number]]  (Abnormal) Collected:  08/06/20 0259    Specimen:  Blood Updated:  08/06/20 0337     Glucose 115 mg/dL      BUN --     Comment: Testing performed by alternate method        Creatinine 1.19 mg/dL      Sodium 141 mmol/L      Potassium 4.3 mmol/L      Comment: Specimen hemolyzed.  Results may be affected.        Chloride 95 mmol/L      CO2 36.0 mmol/L      Calcium 8.9  mg/dL      Total Protein 6.0 g/dL      Albumin 3.40 g/dL      ALT (SGPT) 7 U/L      AST (SGOT) 20 U/L      Comment: Specimen hemolyzed.  Results may be affected.        Alkaline Phosphatase 133 U/L      Total Bilirubin 1.0 mg/dL      eGFR Non African Amer 60 mL/min/1.73      Globulin 2.6 gm/dL      A/G Ratio 1.3 g/dL      BUN/Creatinine Ratio --     Comment: Testing not performed        Anion Gap 10.0 mmol/L     Narrative:       GFR Normal >60  Chronic Kidney Disease <60  Kidney Failure <15      CBC & Differential [[id number]] Collected:  08/06/20 0259    Specimen:  Blood Updated:  08/06/20 0309    Narrative:       The following orders were created for panel order CBC & Differential.  Procedure                               Abnormality         Status                     ---------                               -----------         ------                     CBC Auto Differential[[id number]]        Abnormal            Final result                 Please view results for these tests on the individual orders.    CBC Auto Differential [[id number]]  (Abnormal) Collected:  08/06/20 0259    Specimen:  Blood Updated:  08/06/20 0309     WBC 5.00 10*3/mm3      RBC 4.30 10*6/mm3      Hemoglobin 13.1 g/dL      Hematocrit 38.7 %      MCV 90.1 fL      MCH 30.5 pg      MCHC 33.9 g/dL      RDW 15.2 %      RDW-SD 49.0 fl      MPV 7.5 fL      Platelets 296 10*3/mm3      Neutrophil % 57.3 %      Lymphocyte % 20.9 %      Monocyte % 12.4 %      Eosinophil % 8.0 %      Basophil % 1.4 %      Neutrophils, Absolute 2.90 10*3/mm3      Lymphocytes, Absolute 1.10 10*3/mm3      Monocytes, Absolute 0.60 10*3/mm3      Eosinophils, Absolute 0.40 10*3/mm3      Basophils, Absolute 0.10 10*3/mm3      nRBC 0.0 /100 WBC     POC Glucose Once [[id number]]  (Normal) Collected:  08/05/20 2029    Specimen:  Blood Updated:  08/05/20 2030     Glucose 102 mg/dL      Comment: Serial Number: [id number]:  [id number]       POC Glucose Once [[id number]]  (Abnormal)  Collected:  08/05/20 1708    Specimen:  Blood Updated:  08/05/20 1712     Glucose 153 mg/dL      Comment: Serial Number: [id number]:  [id number]       POC Glucose Once [[id number]]  (Abnormal) Collected:  08/05/20 1153    Specimen:  Blood Updated:  08/05/20 1202     Glucose 111 mg/dL      Comment: Serial Number: [id number]:  [id number]              Assessment/Plan       SBO (small bowel obstruction) (CMS/HCC)  Chronic pneumatosis in multiple loops of dilated small bowel  Type 2 diabetes  Essential hypertension  Stage III chronic kidney disease  Mixed simple and mucopurulent chronic bronchitis  Monoclonal paraproteinemia  Morbid obesity  Narcotic dependency  Obstructive sleep apnea  Dyslipidemia  Liver cirrhosis  Paroxysmal atrial fibrillation  Polyneuropathy  Peripheral vascular disease         Improving with NG decompression and NPO except for ice chips, though using high amounts IV dilaudid-was decreased yesterday and he appears comfortable and had a BM. Will decrease pain med further.    Sasha Patel, KOURTNEY  08/06/20  09:35

## 2020-08-06 NOTE — PROGRESS NOTES
Resume cardiac meds when able to take PO.  Patient will need DAPT given PCI within last 1 year and no plan for surgery

## 2020-08-06 NOTE — THERAPY EVALUATION
Patient Name: Ro Nathan  : 1945    MRN: [id number]                              Today's Date: 2020       Admit Date: 2020    Visit Dx:     ICD-10-CM ICD-9-CM   1. SBO (small bowel obstruction) (CMS/[facility]) K56.609 560.9     Patient Active Problem List   Diagnosis   • Skin ulcer (CMS/[facility])   • Chronic low back pain   • DDD (degenerative disc disease), lumbar   • Spondylosis of lumbosacral region   • Atrial fibrillation (CMS/[facility])   • Chronic obstructive pulmonary disease (CMS/[facility])   • Coronary artery disease   • Gastroesophageal reflux disease   • Hyperlipidemia   • Essential hypertension   • Lumbar radiculopathy   • Other hammer toe(s) (acquired), right foot   • Presence of cardiac pacemaker   • Status post coronary artery stent placement   • Lumbar facet joint pain   • COPD exacerbation (CMS/[facility])   • COPD (chronic obstructive pulmonary disease) (CMS/[facility])   • Unstable angina (CMS/[facility])   • Stented coronary artery   • Small bowel obstruction due to adhesions (CMS/[facility])   • Elevated lipoprotein(a)   • Generalized abdominal pain   • Small bowel obstruction (CMS/[facility])   • SBO (small bowel obstruction) (CMS/[facility])     Past Medical History:   Diagnosis Date   • Bruises easily    • CHF (congestive heart failure) (CMS/[facility])    • Congenital heart defect    • Difficulty attaining erection    • Hypertension    • Low back pain    • Poor circulation    • Prostate cancer (CMS/[facility])    • Shortness of breath    • Stented coronary artery 2019    MICHAEL to LAD     Past Surgical History:   Procedure Laterality Date   • APPENDECTOMY     • CARDIAC CATHETERIZATION N/A 2019    Procedure: Left Heart Cath;  Surgeon: Mirza Munson MD;  Location: [facility] CATH INVASIVE LOCATION;  Service: Cardiology   • CARDIAC CATHETERIZATION N/A 2019    Procedure: Stent MICHAEL coronary;  Surgeon: Mirza Munson MD;  Location: [facility] CATH INVASIVE LOCATION;  Service: Cardiology   • CHOLECYSTECTOMY     • ELBOW  PROCEDURE      left   • HERNIA REPAIR     • INTERVENTIONAL RADIOLOGY PROCEDURE N/A 12/5/2019    Procedure: Intravascular Ultrasound;  Surgeon: Mirza Munson MD;  Location:  [facility] CATH INVASIVE LOCATION;  Service: Cardiology   • AK RT/LT HEART CATHETERS N/A 12/5/2019    Procedure: Percutaneous Coronary Intervention;  Surgeon: Mirza Munson MD;  Location:  [facility] CATH INVASIVE LOCATION;  Service: Cardiology     General Information     Row Name 08/06/20 1106          PT Evaluation Time/Intention    Document Type  evaluation  -HC     Mode of Treatment  physical therapy  -HC     Row Name 08/06/20 1106          General Information    Patient Profile Reviewed?  yes  -HC     Prior Level of Function  independent: Pt reports he has RW at home as needed  -     Row Name 08/06/20 1106          Relationship/Environment    Lives With  spouse  -     Row Name 08/06/20 1106          Resource/Environmental Concerns    Current Living Arrangements  home/apartment/condo  -     Row Name 08/06/20 1106          Home Main Entrance    Number of Stairs, Main Entrance  none  -HC     Row Name 08/06/20 1106          Cognitive Assessment/Intervention- PT/OT    Orientation Status (Cognition)  oriented x 4  -HC     Row Name 08/06/20 1106          Safety Issues, Functional Mobility    Impairments Affecting Function (Mobility)  balance;pain  -HC       User Key  (r) = Recorded By, (t) = Taken By, (c) = Cosigned By    Initials Name Provider Type    HC Kathi Hall, PT Physical Therapist        Mobility     Row Name 08/06/20 1106          Bed Mobility Assessment/Treatment    Bed Mobility Assessment/Treatment  supine-sit  -HC     Supine-Sit [place] (Bed Mobility)  independent  -HC     Row Name 08/06/20 1106          Sit-Stand Transfer    Sit-Stand [place] (Transfers)  independent  -HC     Row Name 08/06/20 1106          Gait/Stairs Assessment/Training    Gait/Stairs Assessment/Training  gait/ambulation  independence;gait/ambulation assistive device  -     [place] Level (Gait)  1 person to manage equipment;conditional independence  -     Assistive Device (Gait)  walker, front-wheeled  -     Distance in Feet (Gait)  450 ft  -HC     Pattern (Gait)  step-through  -     Comment (Gait/Stairs)  no signs of LOB or safety concerns with ambulation, increased reliane on RW for UE support  -       User Key  (r) = Recorded By, (t) = Taken By, (c) = Cosigned By    Initials Name Provider Type    HC Kathi Hall, PT Physical Therapist        Obj/Interventions     Row Name 08/06/20 1108          General ROM    GENERAL ROM COMMENTS  BUEs WFL, BLEs WFL  -HC     Row Name 08/06/20 1108          MMT (Manual Muscle Testing)    General MMT Comments  BUEs WFL, BLEs grossly WFL  -     Row Name 08/06/20 1108          Static Sitting Balance    Level of [place] (Unsupported Sitting, Static Balance)  independent  -     Sitting Position (Unsupported Sitting, Static Balance)  sitting on edge of bed  -     Time Able to Maintain Position (Unsupported Sitting, Static Balance)  3 to 4 minutes  -     Row Name 08/06/20 1108          Dynamic Sitting Balance    Level of [place], Reaches Outside Midline (Sitting, Dynamic Balance)  independent  -     Sitting Position, Reaches Outside Midline (Sitting, Dynamic Balance)  sitting on edge of bed  -     Row Name 08/06/20 1108          Static Standing Balance    Level of [place] (Supported Standing, Static Balance)  conditional independence  -     Time Able to Maintain Position (Supported Standing, Static Balance)  1 to 2 minutes  -     Assistive Device Utilized (Supported Standing, Static Balance)  walker, rolling  -     Row Name 08/06/20 1108          Dynamic Standing Balance    Level of [place], Reaches Outside Midline (Standing, Dynamic Balance)  1 person to manage equipment;conditional independence  -     Time Able to Maintain Position, Reaches  Outside Midline (Standing, Dynamic Balance)  more than 5 minutes  -HC     Assistive Device Utilized (Supported Standing, Dynamic Balance)  walker, rolling  -HC       User Key  (r) = Recorded By, (t) = Taken By, (c) = Cosigned By    Initials Name Provider Type     Kathi Hall, PT Physical Therapist        Goals/Plan    No documentation.       Clinical Impression     Row Name 08/06/20 1108          Pain Assessment    Additional Documentation  Pain Scale: Numbers Pre/Post-Treatment (Group)  -HC     Row Name 08/06/20 1108          Pain Scale: Numbers Pre/Post-Treatment    Pain Scale: Numbers, Pretreatment  5/10  -HC     Pain Scale: Numbers, Post-Treatment  5/10  -HC     Pre/Post Treatment Pain Comment  abdominal region  -     Row Name 08/06/20 1108          Physical Therapy Clinical Impression    Patient/Family Goals Statement (PT Clinical Impression)  Pt is 75 yo male admitted for abdominal pain.  CT abdomen showing hernia and small bowel obstruction.  Conservative mgmt at this time with NG tube placed.   -     Criteria for Skilled Interventions Met (PT Clinical Impression)  current level of function same as previous level of function  -     Row Name 08/06/20 1108          Positioning and Restraints    Pre-Treatment Position  in bed  -HC     Post Treatment Position  chair  -HC     In Chair  notified nsg;call light within reach;encouraged to call for assist  -HC       User Key  (r) = Recorded By, (t) = Taken By, (c) = Cosigned By    Initials Name Provider Type     Kathi Hall, PT Physical Therapist        Outcome Measures     Row Name 08/06/20 1110          How much help from another person do you currently need...    Turning from your back to your side while in flat bed without using bedrails?  4  -HC     Moving from lying on back to sitting on the side of a flat bed without bedrails?  4  -HC     Moving to and from a bed to a chair (including a wheelchair)?  4  -HC     Standing up from a chair  using your arms (e.g., wheelchair, bedside chair)?  4  -HC     Climbing 3-5 steps with a railing?  3  -HC     To walk in hospital room?  4  -HC     AM-PAC 6 Clicks Score (PT)  23  -HC     Row Name 08/06/20 1110          Functional Assessment    Outcome Measure Options  AM-PAC 6 Clicks Basic Mobility (PT)  -HC       User Key  (r) = Recorded By, (t) = Taken By, (c) = Cosigned By    Initials Name Provider Type    HC Kathi Hall, PT Physical Therapist        Physical Therapy Education                 Title: PT OT SLP Therapies (Not Started)     Topic: Physical Therapy (Not Started)     Point: Mobility training (Not Started)     Description:   Instruct learner(s) on safety and technique for assisting patient out of bed, chair or wheelchair.  Instruct in the proper use of assistive devices, such as walker, crutches, cane or brace.              Patient Friendly Description:   It's important to get you on your feet again, but we need to do so in a way that is safe for you. Falling has serious consequences, and your personal safety is the most important thing of all.        When it's time to get out of bed, one of us or a family member will sit next to you on the bed to give you support.     If your doctor or nurse tells you to use a walker, crutches, a cane, or a brace, be sure you use it every time you get out of bed, even if you think you don't need it.    Learner Progress:   Not documented in this visit.          Point: Precautions (Not Started)     Description:   Instruct learner(s) on prescribed precautions during mobility and gait tasks              Learner Progress:   Not documented in this visit.                          PT Recommendation and Plan     Outcome Summary/Treatment Plan (PT)  Anticipated Discharge Disposition (PT): home  Plan of Care Reviewed With: patient  Progress: improving  Outcome Summary: Pt is 75 yo male admitted for abdominal pain.  CT abdomen showing hernia and small bowel obstruction.  Pt  had NG tube placed.  Pt able to complete bed mobility with indep, transfers with indep.  Pt able to ambulate using RW with indep and assist for IV pole.  Pt reports he has RW at home if needed.  Pt can be up with assist of nursing staff for IV pole mgmt.  No further inpatient PT needs.  Plan home with wife.  PPE donned: mask with faceshield, gloves.     Time Calculation:   PT Charges     Row Name 08/06/20 1112             Time Calculation    Start Time  1004  -HC      Stop Time  1026  -HC      Time Calculation (min)  22 min  -HC      PT Received On  08/06/20  -         Time Calculation- PT    Total Timed Code Minutes- PT  0 minute(s)  -HC        User Key  (r) = Recorded By, (t) = Taken By, (c) = Cosigned By    Initials Name Provider Type     Kathi Hall, PT Physical Therapist        Therapy Charges for Today     Code Description Service Date Service Provider Modifiers Qty    [id number]  PT EVAL LOW COMPLEXITY 4 8/6/2020 Kathi Hall, PT GP 1          PT G-Codes  Outcome Measure Options: AM-PAC 6 Clicks Basic Mobility (PT)  AM-PAC 6 Clicks Score (PT): 23    Kathi Hall PT  8/6/2020

## 2020-08-06 NOTE — PROGRESS NOTES
Had a bowel movement this morning.  He did ask for his nasogastric tube to be return to suction earlier this morning but only about 100 to 200 cc came out      Vitals:    08/05/20 1235 08/05/20 2031 08/06/20 0100 08/06/20 0519   BP: 139/78 153/73 148/81 160/75   BP Location: Left arm Left arm Right arm Right arm   Patient Position: Lying Lying Sitting Lying   Pulse: 71 72 73 69   Resp: 20 18 19 20   Temp: 98.3 °F (36.8 °C) 98.2 °F (36.8 °C) 98.5 °F (36.9 °C) 98.4 °F (36.9 °C)   TempSrc: Oral Oral Oral Oral   SpO2: 96% 94% 95% 96%   Weight:       Height:            Intake/Output       08/04/20 0701 - 08/05/20 0700 08/05/20 0701 - 08/06/20 0700 08/06/20 0701 - 08/07/20 0700      6483-4208 0758-9751 Total 1703-4899 7512-5794 Total 5165-7422 6463-4658 Total       Intake    P.O.  --  -- --  300  240 540  --  -- --    I.V.  1091  1000 2091  2174  951 3125  --  -- --    IV Piggyback  --  -- --  100  100 200  --  -- --    Total Intake 1091 1000 2091 2574 1291 3865 -- -- --       Output    Urine  --  400 400  --  -- --  --  -- --    Emesis/NG output  3250  2375 5625  925  800 1725  400  -- 400    Total Output 3250 2775 6025  400 -- 400           Lungs: CTAB,no wheeze  CV: RRR  Abd: irreducible hernia, but soft  Ext: no edema      Lab Results   Component Value Date    WBC 5.00 08/06/2020    HGB 13.1 08/06/2020    HCT 38.7 08/06/2020    MCV 90.1 08/06/2020     08/06/2020     Lab Results   Component Value Date    GLUCOSE 115 (H) 08/06/2020    BUN  08/06/2020      Comment:      Testing performed by alternate method    BUN 20 08/06/2020    CREATININE 1.19 08/06/2020    EGFRIFNONA 60 (L) 08/06/2020    BCR  08/06/2020      Comment:      Testing not performed    K 4.3 08/06/2020    CO2 36.0 (H) 08/06/2020    CALCIUM 8.9 08/06/2020    ALBUMIN 3.40 (L) 08/06/2020    AST 20 08/06/2020    ALT 7 08/06/2020            Assessment and plan    SBO with large hernia  -had a bm, will dc ngt and adv diet as tolerated

## 2020-08-07 LAB
ANION GAP SERPL CALCULATED.3IONS-SCNC: 11 MMOL/L (ref 5–15)
BASOPHILS # BLD AUTO: 0.1 10*3/MM3 (ref 0–0.2)
BASOPHILS NFR BLD AUTO: 1.3 % (ref 0–1.5)
BUN SERPL-MCNC: 14 MG/DL (ref 8–23)
BUN SERPL-MCNC: ABNORMAL MG/DL
BUN/CREAT SERPL: ABNORMAL
CALCIUM SPEC-SCNC: 8.5 MG/DL (ref 8.6–10.5)
CHLORIDE SERPL-SCNC: 97 MMOL/L (ref 98–107)
CO2 SERPL-SCNC: 31 MMOL/L (ref 22–29)
CREAT SERPL-MCNC: 1.01 MG/DL (ref 0.76–1.27)
DEPRECATED RDW RBC AUTO: 46.8 FL (ref 37–54)
EOSINOPHIL # BLD AUTO: 0.5 10*3/MM3 (ref 0–0.4)
EOSINOPHIL NFR BLD AUTO: 8.9 % (ref 0.3–6.2)
ERYTHROCYTE [DISTWIDTH] IN BLOOD BY AUTOMATED COUNT: 14.8 % (ref 12.3–15.4)
GFR SERPL CREATININE-BSD FRML MDRD: 72 ML/MIN/1.73
GLUCOSE BLDC GLUCOMTR-MCNC: 112 MG/DL (ref 70–105)
GLUCOSE BLDC GLUCOMTR-MCNC: 116 MG/DL (ref 70–105)
GLUCOSE BLDC GLUCOMTR-MCNC: 165 MG/DL (ref 70–105)
GLUCOSE BLDC GLUCOMTR-MCNC: 191 MG/DL (ref 70–105)
GLUCOSE SERPL-MCNC: 122 MG/DL (ref 65–99)
HCT VFR BLD AUTO: 37.9 % (ref 37.5–51)
HGB BLD-MCNC: 13.1 G/DL (ref 13–17.7)
LYMPHOCYTES # BLD AUTO: 1.1 10*3/MM3 (ref 0.7–3.1)
LYMPHOCYTES NFR BLD AUTO: 17.3 % (ref 19.6–45.3)
MCH RBC QN AUTO: 30.8 PG (ref 26.6–33)
MCHC RBC AUTO-ENTMCNC: 34.6 G/DL (ref 31.5–35.7)
MCV RBC AUTO: 89 FL (ref 79–97)
MONOCYTES # BLD AUTO: 0.7 10*3/MM3 (ref 0.1–0.9)
MONOCYTES NFR BLD AUTO: 10.8 % (ref 5–12)
NEUTROPHILS NFR BLD AUTO: 3.8 10*3/MM3 (ref 1.7–7)
NEUTROPHILS NFR BLD AUTO: 61.7 % (ref 42.7–76)
NRBC BLD AUTO-RTO: 0 /100 WBC (ref 0–0.2)
PLATELET # BLD AUTO: 271 10*3/MM3 (ref 140–450)
PMV BLD AUTO: 7.6 FL (ref 6–12)
POTASSIUM SERPL-SCNC: 4.2 MMOL/L (ref 3.5–5.2)
RBC # BLD AUTO: 4.26 10*6/MM3 (ref 4.14–5.8)
SODIUM SERPL-SCNC: 139 MMOL/L (ref 136–145)
WBC # BLD AUTO: 6.1 10*3/MM3 (ref 3.4–10.8)

## 2020-08-07 PROCEDURE — 94799 UNLISTED PULMONARY SVC/PX: CPT

## 2020-08-07 PROCEDURE — 25010000002 DIPHENHYDRAMINE PER 50 MG: Performed by: NURSE PRACTITIONER

## 2020-08-07 PROCEDURE — 63710000001 INSULIN LISPRO (HUMAN) PER 5 UNITS: Performed by: FAMILY MEDICINE

## 2020-08-07 PROCEDURE — 25010000002 HYDROMORPHONE PER 4 MG: Performed by: SURGERY

## 2020-08-07 PROCEDURE — 25010000002 HYDRALAZINE PER 20 MG: Performed by: NURSE PRACTITIONER

## 2020-08-07 PROCEDURE — 80048 BASIC METABOLIC PNL TOTAL CA: CPT | Performed by: FAMILY MEDICINE

## 2020-08-07 PROCEDURE — 25010000002 PIPERACILLIN SOD-TAZOBACTAM PER 1 G: Performed by: FAMILY MEDICINE

## 2020-08-07 PROCEDURE — 99231 SBSQ HOSP IP/OBS SF/LOW 25: CPT | Performed by: NURSE PRACTITIONER

## 2020-08-07 PROCEDURE — 94640 AIRWAY INHALATION TREATMENT: CPT

## 2020-08-07 PROCEDURE — 82962 GLUCOSE BLOOD TEST: CPT

## 2020-08-07 PROCEDURE — 99233 SBSQ HOSP IP/OBS HIGH 50: CPT | Performed by: SURGERY

## 2020-08-07 PROCEDURE — 63710000001 ONDANSETRON PER 8 MG: Performed by: FAMILY MEDICINE

## 2020-08-07 PROCEDURE — 25010000002 ENOXAPARIN PER 10 MG: Performed by: FAMILY MEDICINE

## 2020-08-07 PROCEDURE — 85025 COMPLETE CBC W/AUTO DIFF WBC: CPT | Performed by: FAMILY MEDICINE

## 2020-08-07 RX ORDER — ALBUTEROL SULFATE 0.63 MG/3ML
0.63 SOLUTION RESPIRATORY (INHALATION)
Status: DISCONTINUED | OUTPATIENT
Start: 2020-08-07 | End: 2020-08-08 | Stop reason: HOSPADM

## 2020-08-07 RX ORDER — IPRATROPIUM BROMIDE AND ALBUTEROL SULFATE 2.5; .5 MG/3ML; MG/3ML
3 SOLUTION RESPIRATORY (INHALATION)
Status: DISCONTINUED | OUTPATIENT
Start: 2020-08-07 | End: 2020-08-08 | Stop reason: HOSPADM

## 2020-08-07 RX ORDER — ASPIRIN 325 MG
325 TABLET, DELAYED RELEASE (ENTERIC COATED) ORAL DAILY
Status: DISCONTINUED | OUTPATIENT
Start: 2020-08-08 | End: 2020-08-08 | Stop reason: HOSPADM

## 2020-08-07 RX ORDER — CLOPIDOGREL BISULFATE 75 MG/1
75 TABLET ORAL DAILY
Status: DISCONTINUED | OUTPATIENT
Start: 2020-08-07 | End: 2020-08-08 | Stop reason: HOSPADM

## 2020-08-07 RX ORDER — LIDOCAINE 50 MG/G
2 PATCH TOPICAL
Status: DISCONTINUED | OUTPATIENT
Start: 2020-08-07 | End: 2020-08-08 | Stop reason: HOSPADM

## 2020-08-07 RX ORDER — PREGABALIN 75 MG/1
150 CAPSULE ORAL 2 TIMES DAILY
Status: DISCONTINUED | OUTPATIENT
Start: 2020-08-08 | End: 2020-08-08 | Stop reason: HOSPADM

## 2020-08-07 RX ADMIN — INSULIN LISPRO 2 UNITS: 100 INJECTION, SOLUTION INTRAVENOUS; SUBCUTANEOUS at 17:40

## 2020-08-07 RX ADMIN — HYDROMORPHONE HYDROCHLORIDE 0.5 MG: 2 INJECTION, SOLUTION INTRAMUSCULAR; INTRAVENOUS; SUBCUTANEOUS at 10:09

## 2020-08-07 RX ADMIN — METOPROLOL TARTRATE 2.5 MG: 5 INJECTION INTRAVENOUS at 00:52

## 2020-08-07 RX ADMIN — SODIUM CHLORIDE, SODIUM LACTATE, POTASSIUM CHLORIDE, AND CALCIUM CHLORIDE 150 ML/HR: 600; 310; 30; 20 INJECTION, SOLUTION INTRAVENOUS at 00:48

## 2020-08-07 RX ADMIN — LIDOCAINE 2 PATCH: 50 PATCH TOPICAL at 13:22

## 2020-08-07 RX ADMIN — HYDRALAZINE HYDROCHLORIDE 10 MG: 20 INJECTION INTRAMUSCULAR; INTRAVENOUS at 22:22

## 2020-08-07 RX ADMIN — METOPROLOL TARTRATE 25 MG: 25 TABLET, FILM COATED ORAL at 17:40

## 2020-08-07 RX ADMIN — METOPROLOL TARTRATE 2.5 MG: 5 INJECTION INTRAVENOUS at 10:10

## 2020-08-07 RX ADMIN — HYDROMORPHONE HYDROCHLORIDE 0.5 MG: 2 INJECTION, SOLUTION INTRAMUSCULAR; INTRAVENOUS; SUBCUTANEOUS at 17:40

## 2020-08-07 RX ADMIN — ASPIRIN 300 MG: 300 SUPPOSITORY RECTAL at 10:09

## 2020-08-07 RX ADMIN — Medication 10 ML: at 21:59

## 2020-08-07 RX ADMIN — PIPERACILLIN AND TAZOBACTAM 3.38 G: 3; .375 INJECTION, POWDER, FOR SOLUTION INTRAVENOUS at 22:00

## 2020-08-07 RX ADMIN — INSULIN LISPRO 2 UNITS: 100 INJECTION, SOLUTION INTRAVENOUS; SUBCUTANEOUS at 21:58

## 2020-08-07 RX ADMIN — HYDROMORPHONE HYDROCHLORIDE 0.5 MG: 2 INJECTION, SOLUTION INTRAMUSCULAR; INTRAVENOUS; SUBCUTANEOUS at 03:22

## 2020-08-07 RX ADMIN — Medication 10 ML: at 10:13

## 2020-08-07 RX ADMIN — ENOXAPARIN SODIUM 40 MG: 40 INJECTION, SOLUTION INTRAVENOUS; SUBCUTANEOUS at 15:54

## 2020-08-07 RX ADMIN — IPRATROPIUM BROMIDE AND ALBUTEROL SULFATE 3 ML: 2.5; .5 SOLUTION RESPIRATORY (INHALATION) at 14:36

## 2020-08-07 RX ADMIN — ONDANSETRON HYDROCHLORIDE 4 MG: 4 TABLET, FILM COATED ORAL at 03:40

## 2020-08-07 RX ADMIN — PIPERACILLIN AND TAZOBACTAM 3.38 G: 3; .375 INJECTION, POWDER, FOR SOLUTION INTRAVENOUS at 00:50

## 2020-08-07 RX ADMIN — PIPERACILLIN AND TAZOBACTAM 3.38 G: 3; .375 INJECTION, POWDER, FOR SOLUTION INTRAVENOUS at 10:10

## 2020-08-07 RX ADMIN — METOPROLOL TARTRATE 2.5 MG: 5 INJECTION INTRAVENOUS at 05:17

## 2020-08-07 RX ADMIN — INSULIN LISPRO 2 UNITS: 100 INJECTION, SOLUTION INTRAVENOUS; SUBCUTANEOUS at 13:22

## 2020-08-07 RX ADMIN — PIPERACILLIN AND TAZOBACTAM 3.38 G: 3; .375 INJECTION, POWDER, FOR SOLUTION INTRAVENOUS at 15:54

## 2020-08-07 RX ADMIN — DIPHENHYDRAMINE HYDROCHLORIDE 25 MG: 50 INJECTION, SOLUTION INTRAMUSCULAR; INTRAVENOUS at 00:54

## 2020-08-07 RX ADMIN — IPRATROPIUM BROMIDE AND ALBUTEROL SULFATE 3 ML: 2.5; .5 SOLUTION RESPIRATORY (INHALATION) at 19:15

## 2020-08-07 RX ADMIN — DIPHENHYDRAMINE HYDROCHLORIDE 25 MG: 50 INJECTION, SOLUTION INTRAMUSCULAR; INTRAVENOUS at 21:58

## 2020-08-07 RX ADMIN — CLOPIDOGREL BISULFATE 75 MG: 75 TABLET ORAL at 13:22

## 2020-08-07 NOTE — PROGRESS NOTES
LOS: 3 days   Patient Care Team:  Yusuf Jones MD as PCP - Joshua Evangelista MD as Consulting Physician (Cardiology)  Deam, Moise Kathleen MD as Consulting Physician (Cardiac Electrophysiology)    Chief Complaint: sbo  Interval History:   Patient Denies: Vomiting  History taken from: Patient- he had bms and tolerating full liquids      Objective     Vital Signs  Temp:  [97.4 °F (36.3 °C)-98.2 °F (36.8 °C)] 97.4 °F (36.3 °C)  Heart Rate:  [70-76] 71  Resp:  [16] 16  BP: (131-170)/(79-88) 170/86      Physical Exam:   Heart: RR   Lungs:  CTA B   Abd: ventral hernia, less distended, soft   Ext:  No clubbing, cyanosis, edema     Results Review:     I reviewed the patient's new clinical results.    Lab Results (last 24 hours)     Procedure Component Value Units Date/Time    POC Glucose Once [[id number]]  (Abnormal) Collected:  08/07/20 0734    Specimen:  Blood Updated:  08/07/20 0735     Glucose 112 mg/dL      Comment: Serial Number: [id number]:  [id number]       BUN [[id number]]  (Normal) Collected:  08/07/20 0308    Specimen:  Blood Updated:  08/07/20 0733     BUN 14 mg/dL     Basic Metabolic Panel [[id number]]  (Abnormal) Collected:  08/07/20 0308    Specimen:  Blood Updated:  08/07/20 0352     Glucose 122 mg/dL      BUN --     Comment: Testing performed by alternate method        Creatinine 1.01 mg/dL      Sodium 139 mmol/L      Potassium 4.2 mmol/L      Chloride 97 mmol/L      CO2 31.0 mmol/L      Calcium 8.5 mg/dL      eGFR Non African Amer 72 mL/min/1.73      BUN/Creatinine Ratio --     Comment: Testing not performed        Anion Gap 11.0 mmol/L     Narrative:       GFR Normal >60  Chronic Kidney Disease <60  Kidney Failure <15      CBC & Differential [[id number]] Collected:  08/07/20 0308    Specimen:  Blood Updated:  08/07/20 0332    Narrative:       The following orders were created for panel order CBC & Differential.  Procedure                               Abnormality         Status                      ---------                               -----------         ------                     CBC Auto Differential[[id number]]        Abnormal            Final result                 Please view results for these tests on the individual orders.    CBC Auto Differential [[id number]]  (Abnormal) Collected:  08/07/20 0308    Specimen:  Blood Updated:  08/07/20 0332     WBC 6.10 10*3/mm3      RBC 4.26 10*6/mm3      Hemoglobin 13.1 g/dL      Hematocrit 37.9 %      MCV 89.0 fL      MCH 30.8 pg      MCHC 34.6 g/dL      RDW 14.8 %      RDW-SD 46.8 fl      MPV 7.6 fL      Platelets 271 10*3/mm3      Neutrophil % 61.7 %      Lymphocyte % 17.3 %      Monocyte % 10.8 %      Eosinophil % 8.9 %      Basophil % 1.3 %      Neutrophils, Absolute 3.80 10*3/mm3      Lymphocytes, Absolute 1.10 10*3/mm3      Monocytes, Absolute 0.70 10*3/mm3      Eosinophils, Absolute 0.50 10*3/mm3      Basophils, Absolute 0.10 10*3/mm3      nRBC 0.0 /100 WBC     POC Glucose Once [[id number]]  (Abnormal) Collected:  08/06/20 2151    Specimen:  Blood Updated:  08/06/20 2152     Glucose 121 mg/dL      Comment: Serial Number: [id number]:  [id number]       POC Glucose Once [[id number]]  (Normal) Collected:  08/06/20 1628    Specimen:  Blood Updated:  08/06/20 1630     Glucose 93 mg/dL      Comment: Serial Number: [id number]:  [id number]       POC Glucose Once [[id number]]  (Abnormal) Collected:  08/06/20 1055    Specimen:  Blood Updated:  08/06/20 1154     Glucose 122 mg/dL      Comment: Serial Number: [id number]:  [id number]             Assessment/Plan:    SBO (small bowel obstruction) (CMS/HCC)      74 y.o. male with SBO and large ventral hernia and loss of domain, improving. Had bms, will advance to low residue. Home tomorrow.       Cleopatra Wang MD  General Surgeon  08/07/20

## 2020-08-07 NOTE — PROGRESS NOTES
Continued Stay Note  [facility]     Patient Name: Ro Nathan  MRN: [id number]  Today's Date: 8/7/2020    Admit Date: 8/4/2020    Discharge Plan     Row Name 08/07/20 0841       Plan    Plan  Anticipate routine discharge home with spouse at this time.     Patient/Family in Agreement with Plan  yes    Plan Comments  Barriers to discharge: diet advanced to fulls at this time. NGT removed. Anticipate discharge today or tomorrow.           Expected Discharge Date and Time     Expected Discharge Date Expected Discharge Time    Aug 8, 2020               [place] [address]   [phone]  office  [phone]  fax  [email or web address]

## 2020-08-07 NOTE — PROGRESS NOTES
[facility]        LOS:  LOS: 3 days   Patient Name: Ro Nathan  Age/Sex: 74 y.o. male  : 1945  MRN: [id number]    Day of Service: 20   Length of Stay: 3  Encounter Provider: KOURTNEY Mccormick  Place of Service: [facility]  Patient Care Team:  Yusuf Jones MD as PCP - Joshua Evangelista MD as Consulting Physician (Cardiology)  Deam, Moise Kathleen MD as Consulting Physician (Cardiac Electrophysiology)    Subjective:     Chief Complaint: f/u afib, pre operative risk assessment    Subjective: Patient doing well this am. He reports mild abdominal discomfort but otherwise is passing gas and having a BM, he states pain has significantly improved.  He denies chest pain or SOA.  He is still not taking PO medications    Current Medications:   Scheduled Meds:  aspirin 300 mg Rectal Daily   enoxaparin 40 mg Subcutaneous Q24H   insulin lispro 0-7 Units Subcutaneous 4x Daily With Meals & Nightly   ipratropium-albuterol 3 mL Nebulization 4x Daily - RT   magnesium sulfate 2 g Intravenous Once   metoprolol tartrate 2.5 mg Intravenous Q6H   piperacillin-tazobactam 3.375 g Intravenous Q8H   sodium chloride 10 mL Intravenous Q12H     Continuous Infusions:  lactated ringers 150 mL/hr Last Rate: 150 mL/hr (20 0048)       Allergies:  Allergies   Allergen Reactions   • Haloperidol Hives   • Morphine Itching   • Pantoprazole Other (See Comments)     Feels sick after receiving       Review of Systems   Constitution: Negative.   Cardiovascular: Negative for chest pain, dyspnea on exertion, leg swelling, near-syncope, palpitations and paroxysmal nocturnal dyspnea.   Respiratory: Negative for shortness of breath.    Gastrointestinal: Positive for bloating and abdominal pain.   Genitourinary: Negative.    Neurological: Negative.          Objective:     Temp:  [97.4 °F (36.3 °C)-98.2 °F (36.8 °C)] 97.4 °F (36.3 °C)  Heart Rate:  [70-76]  71  Resp:  [16] 16  BP: (131-170)/(79-88) 170/86     Intake/Output Summary (Last 24 hours) at 8/7/2020 0950  Last data filed at 8/7/2020 0907  Gross per 24 hour   Intake 3430 ml   Output 400 ml   Net 3030 ml     Body mass index is 34.44 kg/m².      08/04/20  0620 08/05/20  0447 08/07/20  0420   Weight: 118 kg (260 lb 5.8 oz) 116 kg (255 lb 4.7 oz) 115 kg (253 lb 15.5 oz)         Physical Exam:  General Appearance:    Alert, cooperative, in no acute distress               Neck:   supple,  no JVD   Lungs:     Clear to auscultation,respirations regular, even and                  unlabored    Heart:    Regular rhythm and normal rate, normal S1 and S2   Abdomen:     Hypoactive BS x 4, slightly tender, slightly distended, soft   Extremities:   Moves all extremities well, no edema, no cyanosis, no             redness   Pulses:   Pulses palpable and equal bilaterally   Skin:   No bleeding, bruising or rash   Neurologic:   Awake, alert, oriented x3         Lab Review:   Results from last 7 days   Lab Units 08/07/20 0308 08/06/20  0259  08/04/20  0308   SODIUM mmol/L 139 141   < > 137   POTASSIUM mmol/L 4.2 4.3   < > 3.9   CHLORIDE mmol/L 97* 95*   < > 99   CO2 mmol/L 31.0* 36.0*   < > 24.0   BUN  14 20   < > 16   CREATININE mg/dL 1.01 1.19   < > 1.17   GLUCOSE mg/dL 122* 115*   < > 176*   CALCIUM mg/dL 8.5* 8.9   < > 9.1   AST (SGOT) U/L  --  20  --  15   ALT (SGPT) U/L  --  7  --  8    < > = values in this interval not displayed.         Results from last 7 days   Lab Units 08/07/20 0308 08/06/20  0259   WBC 10*3/mm3 6.10 5.00   HEMOGLOBIN g/dL 13.1 13.1   HEMATOCRIT % 37.9 38.7   PLATELETS 10*3/mm3 271 296     Results from last 7 days   Lab Units 08/04/20  0308   INR  1.03   APTT seconds 25.0     Results from last 7 days   Lab Units 08/05/20  0241 08/04/20  0308   MAGNESIUM mg/dL 2.6* 1.6           Invalid input(s): LDLCALC            Recent Radiology:  Imaging Results (Most Recent)     Procedure Component Value Units  Date/Time    XR Abdomen KUB [[id number]] Collected:  08/04/20 2311     Updated:  08/05/20 0113    Narrative:       Frontal abdomen    CLINICAL INDICATION: NG tube.    COMPARISON: None.    FINDINGS: Enteric tube in the stomach. Gas-distended loops of bowel in the upper abdomen. No intraperitoneal free air. No abnormal mass or calcification. Lung bases are clear. Osseous structures are unremarkable.      Impression:       1. Enteric tube in the gastric fundus.  2. Gaseous distended bowel in the upper abdomen.    Electronically signed by:  Joshua Whaley M.D.    8/4/2020 11:12 PM    CT Abdomen Pelvis With Contrast [[id number]] Collected:  08/04/20 0251     Updated:  08/04/20 0500    Narrative:       EXAMINATION: CT SCAN OF THE ABDOMEN AND PELVIS WITH INTRAVENOUS CONTRAST    DATE OF EXAM: 8/4/2020 4:16 AM    HISTORY: Abdominal pain, recurrent bowel obstructions.    COMPARISON: 6/8/2020.    TECHNIQUE: CT examination of the abdomen and pelvis was performed following the intravenous administration of 100 mL  Isovue-370. CT dose lowering techniques were used, to include: automated exposure control, adjustment for patient size, and/or use of   iterative reconstruction.    FINDINGS:    ABDOMEN/PELVIS:    Lower Chest: Lung bases are clear. Cardiac pacer    Liver: Normal.     Gallbladder/Biliary: Cholecystectomy    Pancreas: Normal.     Spleen: Normal.     Adrenal Glands: Normal.     Kidneys: Normal.     GI Tract: There is a large anterior abdominal wall hernia with a high-grade small bowel obstruction occurring in the right side of the hernia sac against the abdominal wall. The small bowel proximal to this site is dilated and filled with fluid. There is   some chronic pneumatosis in the bowel in the anterior and right side of the hernia sac as well as traces of free air. Findings appear generally similar to prior imaging.    Mesentery/Peritoneum: Normal.    Vasculature: Normal.     Lymph Nodes: Normal.     Abdominal Wall: Normal.      Bladder: Normal.     Reproductive: Normal.     Musculoskeletal: Normal.        Impression:         1.  Findings appear similar to the patient's prior CT from June 8, 2020.  2.  Large anterior abdominal wall hernia with a high-grade bowel obstruction occurring in the distal small bowel along the right side of the hernia sac as the bowel reenters the abdominal cavity.  3.  Chronic, fairly extensive pneumatosis involving multiple loops of dilated small bowel in the hernia sac as well as a few small locules of free air. These findings were all present on prior imaging. Surgical consultation is recommended.    Electronically signed by:  Zechariah Handley M.D.    8/4/2020 2:59 AM          ECHOCARDIOGRAM:    Results for orders placed during the hospital encounter of 12/03/19   Adult Transthoracic Echo Complete W/ Cont if Necessary Per Protocol    Narrative · Mild mitral valve regurgitation is present  · Mild dilation of the sinuses of Valsalva is present.  · Left ventricular systolic function is hyperdynamic (EF > 70).  · Left atrial cavity size is severely dilated.  · Right ventricular cavity is mildly dilated.  · Left ventricular diastolic dysfunction (grade II) consistent with   pseudonormalization.     Overall normal LV systolic function and size  Normal RV function, mild RV enlargement  Severe left atrial enlargement  Diastolic dysfunction indeterminate by criteria  No masses  No effusion seen  EF hyperdynamic greater than 70%  No significant valvulopathy seen           I reviewed the patient's new clinical results.    EKG:      Assessment:       SBO (small bowel obstruction) (CMS/HCC)    1) SBO  - advancing diet as tolerated as per primary / surgery  - NG Tube now discontinued  - still not taking PO pills  - surgery managing non-operatively for now     2) Pre-operative Cardiac Evaluation  - last cath 12/2019 as below  - 2D echo 12/2019 showed EF > 70%, grade 2 diastolic dysfunction, mild MR.  - EKG is V-paced /  "non-diagnostic; underlying afib  - may interrupt DAPT if surgery given PCI with MICHAEL > 6 months ago  - as per Dr. Munson \"No contraindication to proceeding forward with surgery from a cardiovascular standpoint.\"        3) CAD s/p PCI with MICHAEL to LAD 12/2019   - residual borderline disease of the LCx being treated medically.  - on dual antiplatelet therapy with aspirin and effient, BB, high dose statin at home  - continue on aspirin suppository while NPO     4) PAF / PPM  - rate controlled with BB, CCB, and PO dig at home  - not on anticoagulation given hx GIB  - continue on IV BB q6h while NPO      5) COPD     6) stage III CKD     7) HTN  - IV PRN hydralazine while NPO     8) HLD  - on statin at home    Plan:   Patient stable from cardiovascular standpoint  no plan for surgery, SBO improving  Resume all PO cardiac medications when able to take PO pills as directed by surgery  Patient to follow up with cardiology at next scheduled appointment on 8/24    We will sign off and be available as needed.  Please call with questions.         Jen Cole, APRN  08/07/20  09:50  "

## 2020-08-07 NOTE — PROGRESS NOTES
LOS: 3 days   Patient Care Team:  Yusuf Jones MD as PCP - General  Joshua Yip MD as Consulting Physician (Cardiology)  Deam, Moise Kathleen MD as Consulting Physician (Cardiac Electrophysiology)    Chief Complaint: Overall feeling much better, NG tube out and he has had no nausea.  He only took 1 nausea pill this morning which immediately relieved his nausea and has had none since    Subjective   Appears comfortable, sitting up in chair.  Tolerating current diet.  Says he had 3 bowel movements and positive flatus since last shift.  Interval History:     Patient Complaints: Single brief episode of nausea but no vomiting  Patient Denies:  N/V, diarrhea, chest pain, cough, SOA, HA  History taken from: patient    Review of Systems:   All neg except noted above  Objective   Pt resting in bed, appears comfortable  Vital Signs  Temp:  [97.4 °F (36.3 °C)-98.2 °F (36.8 °C)] 97.4 °F (36.3 °C)  Heart Rate:  [70-76] 71  Resp:  [16] 16  BP: (131-170)/(79-88) 170/86    Physical Exam:     General Appearance:    Alert, cooperative, in no acute distress   Head:    Normocephalic, without obvious abnormality, atraumatic   Eyes:            Lids and lashes normal, conjunctivae and sclerae normal,   Ears:    Ears appear intact with no abnormalities noted   Throat:   No oral lesions, no thrush, oral mucosa moist   Neck:   No adenopathy, supple, trachea midline,   Back:     No kyphosis present, no scoliosis present, no skin lesions,      erythema or scars, no tenderness to percussion or                   palpation,   range of motion normal   Lungs:     Clear to auscultation,respirations regular, even and                  unlabored    Heart:    Regular rhythm and normal rate, normal S1 and S2, no            murmur, no gallop, no rub, no click   Chest Wall:    No abnormalities observed   Abdomen:     Normal bowel sounds, soft large hernia masses, no organomegaly, soft, mild tenderness, massively obese   Rectal:      Deferred   Extremities:   Moves all extremities well, no edema, no cyanosis, no             redness   Pulses:   Pulses palpable and equal bilaterally   Skin:   No bleeding, bruising or rash   Lymph nodes:   No palpable adenopathy   Neurologic:   Cranial nerves 2 - 12 grossly intact, sensation intact        Results Review:     I reviewed the patient's new clinical results.  I reviewed the patient's new imaging results and agree with the interpretation.    Medication Review:   Scheduled Meds:    aspirin 300 mg Rectal Daily   enoxaparin 40 mg Subcutaneous Q24H   insulin lispro 0-7 Units Subcutaneous 4x Daily With Meals & Nightly   ipratropium-albuterol 3 mL Nebulization 4x Daily - RT   magnesium sulfate 2 g Intravenous Once   metoprolol tartrate 2.5 mg Intravenous Q6H   piperacillin-tazobactam 3.375 g Intravenous Q8H   sodium chloride 10 mL Intravenous Q12H     Continuous Infusions:    lactated ringers 150 mL/hr Last Rate: 150 mL/hr (08/07/20 0048)     PRN Meds:.•  albuterol  •  dextrose  •  dextrose  •  diphenhydrAMINE  •  glucagon (human recombinant)  •  hydrALAZINE  •  HYDROmorphone  •  insulin lispro **AND** insulin lispro  •  magnesium sulfate **OR** magnesium sulfate **OR** magnesium sulfate  •  ondansetron **OR** ondansetron  •  potassium chloride  •  potassium phosphate infusion greater than 15 mMoles **OR** potassium phosphate infusion greater than 15 mMoles **OR** potassium phosphate **OR** sodium phosphate IVPB **OR** sodium phosphate IVPB **OR** sodium phosphate IVPB  •  sodium chloride    Labs:  Lab Results (last 24 hours)     Procedure Component Value Units Date/Time    POC Glucose Once [[id number]]  (Abnormal) Collected:  08/07/20 0734    Specimen:  Blood Updated:  08/07/20 0735     Glucose 112 mg/dL      Comment: Serial Number: [id number]:  [id number]       BUN [[id number]]  (Normal) Collected:  08/07/20 0308    Specimen:  Blood Updated:  08/07/20 0733     BUN 14 mg/dL     Basic Metabolic Panel  [[id number]]  (Abnormal) Collected:  08/07/20 0308    Specimen:  Blood Updated:  08/07/20 0352     Glucose 122 mg/dL      BUN --     Comment: Testing performed by alternate method        Creatinine 1.01 mg/dL      Sodium 139 mmol/L      Potassium 4.2 mmol/L      Chloride 97 mmol/L      CO2 31.0 mmol/L      Calcium 8.5 mg/dL      eGFR Non African Amer 72 mL/min/1.73      BUN/Creatinine Ratio --     Comment: Testing not performed        Anion Gap 11.0 mmol/L     Narrative:       GFR Normal >60  Chronic Kidney Disease <60  Kidney Failure <15      CBC & Differential [[id number]] Collected:  08/07/20 0308    Specimen:  Blood Updated:  08/07/20 0332    Narrative:       The following orders were created for panel order CBC & Differential.  Procedure                               Abnormality         Status                     ---------                               -----------         ------                     CBC Auto Differential[[id number]]        Abnormal            Final result                 Please view results for these tests on the individual orders.    CBC Auto Differential [[id number]]  (Abnormal) Collected:  08/07/20 0308    Specimen:  Blood Updated:  08/07/20 0332     WBC 6.10 10*3/mm3      RBC 4.26 10*6/mm3      Hemoglobin 13.1 g/dL      Hematocrit 37.9 %      MCV 89.0 fL      MCH 30.8 pg      MCHC 34.6 g/dL      RDW 14.8 %      RDW-SD 46.8 fl      MPV 7.6 fL      Platelets 271 10*3/mm3      Neutrophil % 61.7 %      Lymphocyte % 17.3 %      Monocyte % 10.8 %      Eosinophil % 8.9 %      Basophil % 1.3 %      Neutrophils, Absolute 3.80 10*3/mm3      Lymphocytes, Absolute 1.10 10*3/mm3      Monocytes, Absolute 0.70 10*3/mm3      Eosinophils, Absolute 0.50 10*3/mm3      Basophils, Absolute 0.10 10*3/mm3      nRBC 0.0 /100 WBC     POC Glucose Once [[id number]]  (Abnormal) Collected:  08/06/20 2151    Specimen:  Blood Updated:  08/06/20 2152     Glucose 121 mg/dL      Comment: Serial Number: [id number]:   [id number]       POC Glucose Once [[id number]]  (Normal) Collected:  08/06/20 1628    Specimen:  Blood Updated:  08/06/20 1630     Glucose 93 mg/dL      Comment: Serial Number: [id number]:  [id number]       POC Glucose Once [[id number]]  (Abnormal) Collected:  08/06/20 1055    Specimen:  Blood Updated:  08/06/20 1154     Glucose 122 mg/dL      Comment: Serial Number: [id number]:  [id number]              Assessment/Plan       SBO (small bowel obstruction) (CMS/HCC)  Chronic pneumatosis in multiple loops of dilated small bowel  Type 2 diabetes  Essential hypertension  Stage III chronic kidney disease  Mixed simple and mucopurulent chronic bronchitis  Monoclonal paraproteinemia  Morbid obesity  Narcotic dependency  Obstructive sleep apnea  Dyslipidemia  Liver cirrhosis  Paroxysmal atrial fibrillation  Polyneuropathy  Peripheral vascular disease     Making clinical improvement.  I spoke personally with general surgery, Dr. Wang.  Patient is a very poor surgical candidate as there was great trouble at his last attempt at hernia repair to close the surgical wounds.  Since patient has been able to improve on very conservative therapy.  He is not planning any surgery.  Diet has been advanced to low residue by general surgery.  Patient is able to tolerate this, anticipate home in a.m.    For the patient's back pain, I will start him on a Lidoderm patch.      Jovana Dupont DO  08/07/20  10:16

## 2020-08-08 VITALS
DIASTOLIC BLOOD PRESSURE: 69 MMHG | WEIGHT: 253.97 LBS | HEART RATE: 71 BPM | HEIGHT: 72 IN | SYSTOLIC BLOOD PRESSURE: 124 MMHG | BODY MASS INDEX: 34.4 KG/M2 | TEMPERATURE: 98.3 F | RESPIRATION RATE: 18 BRPM | OXYGEN SATURATION: 95 %

## 2020-08-08 LAB
ANION GAP SERPL CALCULATED.3IONS-SCNC: 13 MMOL/L (ref 5–15)
BASOPHILS # BLD AUTO: 0.1 10*3/MM3 (ref 0–0.2)
BASOPHILS NFR BLD AUTO: 1.3 % (ref 0–1.5)
BUN SERPL-MCNC: 11 MG/DL (ref 8–23)
BUN SERPL-MCNC: ABNORMAL MG/DL
BUN/CREAT SERPL: ABNORMAL
CALCIUM SPEC-SCNC: 8.4 MG/DL (ref 8.6–10.5)
CHLORIDE SERPL-SCNC: 101 MMOL/L (ref 98–107)
CO2 SERPL-SCNC: 25 MMOL/L (ref 22–29)
CREAT SERPL-MCNC: 0.98 MG/DL (ref 0.76–1.27)
DEPRECATED RDW RBC AUTO: 47.7 FL (ref 37–54)
EOSINOPHIL # BLD AUTO: 0.5 10*3/MM3 (ref 0–0.4)
EOSINOPHIL NFR BLD AUTO: 8.5 % (ref 0.3–6.2)
ERYTHROCYTE [DISTWIDTH] IN BLOOD BY AUTOMATED COUNT: 15.4 % (ref 12.3–15.4)
GFR SERPL CREATININE-BSD FRML MDRD: 75 ML/MIN/1.73
GLUCOSE BLDC GLUCOMTR-MCNC: 102 MG/DL (ref 70–105)
GLUCOSE BLDC GLUCOMTR-MCNC: 122 MG/DL (ref 70–105)
GLUCOSE SERPL-MCNC: 119 MG/DL (ref 65–99)
HCT VFR BLD AUTO: 36.3 % (ref 37.5–51)
HGB BLD-MCNC: 12.6 G/DL (ref 13–17.7)
LYMPHOCYTES # BLD AUTO: 1.2 10*3/MM3 (ref 0.7–3.1)
LYMPHOCYTES NFR BLD AUTO: 20.1 % (ref 19.6–45.3)
MCH RBC QN AUTO: 30.8 PG (ref 26.6–33)
MCHC RBC AUTO-ENTMCNC: 34.5 G/DL (ref 31.5–35.7)
MCV RBC AUTO: 89.2 FL (ref 79–97)
MONOCYTES # BLD AUTO: 0.6 10*3/MM3 (ref 0.1–0.9)
MONOCYTES NFR BLD AUTO: 9.4 % (ref 5–12)
NEUTROPHILS NFR BLD AUTO: 3.7 10*3/MM3 (ref 1.7–7)
NEUTROPHILS NFR BLD AUTO: 60.7 % (ref 42.7–76)
NRBC BLD AUTO-RTO: 0.1 /100 WBC (ref 0–0.2)
PLATELET # BLD AUTO: 294 10*3/MM3 (ref 140–450)
PMV BLD AUTO: 7.9 FL (ref 6–12)
POTASSIUM SERPL-SCNC: 3.6 MMOL/L (ref 3.5–5.2)
RBC # BLD AUTO: 4.07 10*6/MM3 (ref 4.14–5.8)
SODIUM SERPL-SCNC: 139 MMOL/L (ref 136–145)
WBC # BLD AUTO: 6.2 10*3/MM3 (ref 3.4–10.8)

## 2020-08-08 PROCEDURE — 99231 SBSQ HOSP IP/OBS SF/LOW 25: CPT | Performed by: SURGERY

## 2020-08-08 PROCEDURE — 85025 COMPLETE CBC W/AUTO DIFF WBC: CPT | Performed by: FAMILY MEDICINE

## 2020-08-08 PROCEDURE — 94799 UNLISTED PULMONARY SVC/PX: CPT

## 2020-08-08 PROCEDURE — 25010000002 PIPERACILLIN SOD-TAZOBACTAM PER 1 G: Performed by: FAMILY MEDICINE

## 2020-08-08 PROCEDURE — 82962 GLUCOSE BLOOD TEST: CPT

## 2020-08-08 PROCEDURE — 25010000002 HYDROMORPHONE PER 4 MG: Performed by: SURGERY

## 2020-08-08 PROCEDURE — 80048 BASIC METABOLIC PNL TOTAL CA: CPT | Performed by: FAMILY MEDICINE

## 2020-08-08 RX ADMIN — LIDOCAINE 2 PATCH: 50 PATCH TOPICAL at 09:21

## 2020-08-08 RX ADMIN — ASPIRIN 325 MG: 325 TABLET ORAL at 09:22

## 2020-08-08 RX ADMIN — Medication 10 ML: at 09:25

## 2020-08-08 RX ADMIN — PIPERACILLIN AND TAZOBACTAM 3.38 G: 3; .375 INJECTION, POWDER, FOR SOLUTION INTRAVENOUS at 06:13

## 2020-08-08 RX ADMIN — HYDROMORPHONE HYDROCHLORIDE: 2 INJECTION, SOLUTION INTRAMUSCULAR; INTRAVENOUS; SUBCUTANEOUS at 00:01

## 2020-08-08 RX ADMIN — IPRATROPIUM BROMIDE AND ALBUTEROL SULFATE 3 ML: 2.5; .5 SOLUTION RESPIRATORY (INHALATION) at 11:10

## 2020-08-08 RX ADMIN — METOPROLOL TARTRATE 25 MG: 25 TABLET, FILM COATED ORAL at 09:22

## 2020-08-08 RX ADMIN — PREGABALIN 150 MG: 75 CAPSULE ORAL at 00:34

## 2020-08-08 RX ADMIN — IPRATROPIUM BROMIDE AND ALBUTEROL SULFATE 3 ML: 2.5; .5 SOLUTION RESPIRATORY (INHALATION) at 07:00

## 2020-08-08 RX ADMIN — PREGABALIN 150 MG: 75 CAPSULE ORAL at 09:32

## 2020-08-08 RX ADMIN — CLOPIDOGREL BISULFATE 75 MG: 75 TABLET ORAL at 09:22

## 2020-08-08 RX ADMIN — HYDROMORPHONE HYDROCHLORIDE 0.5 MG: 2 INJECTION, SOLUTION INTRAMUSCULAR; INTRAVENOUS; SUBCUTANEOUS at 06:14

## 2020-08-08 NOTE — DISCHARGE SUMMARY
Date of Discharge:  8/8/2020    Discharge Diagnosis: SBO (small bowel obstruction) (CMS/HCC)  Chronic pneumatosis in multiple loops of dilated small bowel  Opioid dependence  Type 2 diabetes  Essential hypertension  Stage III chronic kidney disease  Mixed simple and mucopurulent chronic bronchitis  Monoclonal paraproteinemia  Morbid obesity  Obstructive sleep apnea  Dyslipidemia  Liver cirrhosis  Paroxysmal atrial fibrillation  Polyneuropathy  Peripheral vascular disease    Presenting Problem/History of Present Illness  Active Hospital Problems    Diagnosis  POA   • SBO (small bowel obstruction) (CMS/HCC) [K56.609]  Yes      Resolved Hospital Problems   No resolved problems to display.        Condition at discharge guarded but stable  Hospital Course  Patient is a 74 y.o. male presented with increasing abdominal pain and distention.  Patient has had this before and knew that he was developing probable bowel obstruction over the previous 3 to 4 days.  Finally the pain become unbearable and he came to emergency department and is found to have a high-grade bowel obstruction was subsequently admitted.  During his hospital stay, he was placed n.p.o., had NG tube placed was given aggressive IV fluid hydration and antiemetics along with empiric parenteral antibiotics.  Patient made slow but steady clinical improvement with just conservative treatment.  Once again he was evaluated by his usual cardiologist in case bowel obstruction did not resolve with conservative treatment.  Notes are on the chart for this.  I did speak personally with general surgery in the main reason this patient is considered a very poor operative risk as the last time he had attempted hernia repair was a very difficult closure and surgeon only had access to skin closure hence the patient has significant hernias.  In either event, patient was able to finally have his NG tube removed.  His obstruction was decompressed.  He is now tolerating his diet  having regular bowel movements.  He will be discharged back to home on his usual home medications.  He is also been to stay on a low residue diet for the foreseeable future.  He will follow-up with primary care in 2 weeks of course earlier as needed.    Procedures Performed         Consults:   Consults     Date and Time Order Name Status Description    8/4/2020 0858 Inpatient Cardiology Consult Completed     8/4/2020 0518 IP Consult to General Surgery Completed     8/4/2020 0518 IP Consult to Family Practice Completed           Pertinent Test Results:Ct Abdomen Pelvis With Contrast    Result Date: 8/4/2020  1.  Findings appear similar to the patient's prior CT from June 8, 2020. 2.  Large anterior abdominal wall hernia with a high-grade bowel obstruction occurring in the distal small bowel along the right side of the hernia sac as the bowel reenters the abdominal cavity. 3.  Chronic, fairly extensive pneumatosis involving multiple loops of dilated small bowel in the hernia sac as well as a few small locules of free air. These findings were all present on prior imaging. Surgical consultation is recommended. Electronically signed by:  Zechariah Handley M.D.  8/4/2020 2:59 AM    Xr Abdomen Kub    Result Date: 8/4/2020  1. Enteric tube in the gastric fundus. 2. Gaseous distended bowel in the upper abdomen. Electronically signed by:  Joshua Whaley M.D.  8/4/2020 11:12 PM      Imaging Results (Last 7 Days)     Procedure Component Value Units Date/Time    XR Abdomen KUB [[id number]] Collected:  08/04/20 2311     Updated:  08/05/20 0113    Narrative:       Frontal abdomen    CLINICAL INDICATION: NG tube.    COMPARISON: None.    FINDINGS: Enteric tube in the stomach. Gas-distended loops of bowel in the upper abdomen. No intraperitoneal free air. No abnormal mass or calcification. Lung bases are clear. Osseous structures are unremarkable.      Impression:       1. Enteric tube in the gastric fundus.  2. Gaseous distended  bowel in the upper abdomen.    Electronically signed by:  Joshua Whaley M.D.    8/4/2020 11:12 PM    CT Abdomen Pelvis With Contrast [[id number]] Collected:  08/04/20 0251     Updated:  08/04/20 0500    Narrative:       EXAMINATION: CT SCAN OF THE ABDOMEN AND PELVIS WITH INTRAVENOUS CONTRAST    DATE OF EXAM: 8/4/2020 4:16 AM    HISTORY: Abdominal pain, recurrent bowel obstructions.    COMPARISON: 6/8/2020.    TECHNIQUE: CT examination of the abdomen and pelvis was performed following the intravenous administration of 100 mL  Isovue-370. CT dose lowering techniques were used, to include: automated exposure control, adjustment for patient size, and/or use of   iterative reconstruction.    FINDINGS:    ABDOMEN/PELVIS:    Lower Chest: Lung bases are clear. Cardiac pacer    Liver: Normal.     Gallbladder/Biliary: Cholecystectomy    Pancreas: Normal.     Spleen: Normal.     Adrenal Glands: Normal.     Kidneys: Normal.     GI Tract: There is a large anterior abdominal wall hernia with a high-grade small bowel obstruction occurring in the right side of the hernia sac against the abdominal wall. The small bowel proximal to this site is dilated and filled with fluid. There is   some chronic pneumatosis in the bowel in the anterior and right side of the hernia sac as well as traces of free air. Findings appear generally similar to prior imaging.    Mesentery/Peritoneum: Normal.    Vasculature: Normal.     Lymph Nodes: Normal.     Abdominal Wall: Normal.     Bladder: Normal.     Reproductive: Normal.     Musculoskeletal: Normal.        Impression:         1.  Findings appear similar to the patient's prior CT from June 8, 2020.  2.  Large anterior abdominal wall hernia with a high-grade bowel obstruction occurring in the distal small bowel along the right side of the hernia sac as the bowel reenters the abdominal cavity.  3.  Chronic, fairly extensive pneumatosis involving multiple loops of dilated small bowel in the hernia sac  as well as a few small locules of free air. These findings were all present on prior imaging. Surgical consultation is recommended.    Electronically signed by:  Zechariah Handley M.D.    8/4/2020 2:59 AM              Condition on Discharge:  Fair    Vital Signs  Temp:  [97.8 °F (36.6 °C)-98.3 °F (36.8 °C)] 98.3 °F (36.8 °C)  Heart Rate:  [70-78] 72  Resp:  [16-18] 18  BP: (124-174)/(69-92) 124/69    Physical Exam:     General Appearance:    Alert, cooperative, in no acute distress   Head:    Normocephalic, without obvious abnormality, atraumatic   Eyes:           Conjunctivae and sclerae normal, no   icterus, no pallor, corneas clear, PERRLA   Throat:   No oral lesions, no thrush, oral mucosa moist   Neck:   No adenopathy, supple, trachea midline, no thyromegaly, no   carotid bruit, no JVD   Lungs:    Distant but adequate adventitial flow ,respirations regular, even and                  unlabored    Heart:    Regular rhythm and normal rate, normal S1 and S2, no            murmur, no gallop, no rub, no click   Chest Wall:    No abnormalities observed   Abdomen:     Normal bowel sounds, no masses, no organomegaly, soft        non-tender, non-distended, no guarding, no rebound                Tenderness, massively obese, 2 somewhat reproducible large hernias present bilateral lower abdominal wall   Rectal:     Deferred   Extremities:   Moves all extremities well, no edema, no cyanosis, no             redness   Pulses:   Pulses palpable and equal bilaterally   Skin:   No bleeding, bruising or rash   Lymph nodes:   No palpable adenopathy   Neurologic:   Cranial nerves 2 - 12 grossly intact, sensation intact, DTR       present and equal bilaterally         Discharge Disposition  Home or Self Care    Discharge Medications     Discharge Medications      Continue These Medications      Instructions Start Date   aspirin 81 MG EC tablet   81 mg, Oral, Daily      atorvastatin 40 MG tablet  Commonly known as:  LIPITOR   40 mg,  Oral, Nightly      digoxin 250 MCG tablet  Commonly known as:  LANOXIN   250 mcg, Oral, Daily Digoxin      dilTIAZem  MG 24 hr capsule  Commonly known as:  CARDIZEM CD   120 mg, Oral, Daily      furosemide 40 MG tablet  Commonly known as:  LASIX   40 mg, Oral, Daily      losartan 100 MG tablet  Commonly known as:  COZAAR   100 mg, Oral, Daily      Lyrica 150 MG capsule  Generic drug:  pregabalin   150 mg, Oral, 2 Times Daily      metoprolol tartrate 25 MG tablet  Commonly known as:  LOPRESSOR   25 mg, Oral, Every 12 Hours Scheduled      mirtazapine 30 MG tablet  Commonly known as:  REMERON   30 mg, Oral, Nightly      oxyCODONE-acetaminophen  MG per tablet  Commonly known as:  Percocet   1 tablet, Oral, Every 6 Hours PRN      potassium chloride 10 MEQ CR tablet  Commonly known as:  K-DUR   10 mEq, Oral, 2 Times Daily      prasugrel 10 MG tablet  Commonly known as:  EFFIENT   10 mg, Oral, Daily      traZODone 100 MG tablet  Commonly known as:  DESYREL   100 mg, Oral, Nightly      Ventolin  (90 Base) MCG/ACT inhaler  Generic drug:  albuterol sulfate HFA   2 puffs, Inhalation, Every 6 Hours PRN             Discharge Diet:   Diet Instructions     Diet: Specialty Diet; Thin Liquids, No Restrictions; Low Residue      Discharge Diet:  Specialty Diet    Fluid Consistency:  Thin Liquids, No Restrictions    Specialty Diets:  Low Residue          Activity at Discharge:   Activity Instructions     Gradually Increase Activity Until at Pre-Hospitalization Level            Follow-up Appointments  Future Appointments   Date Time Provider Department Center   8/24/2020 10:15 AM Moise Watson MD [facility] None   10/23/2020  2:30 PM Herberth Oconnor MD [facility]     Additional Instructions for the Follow-ups that You Need to Schedule     Discharge Follow-up with PCP   As directed       Currently Documented PCP:    Yusuf Jones MD    PCP Phone Number:    [phone]     Follow Up Details:  2  weeks               Test Results Pending at Discharge: None       Jovana Dupont DO  08/08/20  09:53

## 2020-08-08 NOTE — PROGRESS NOTES
General Surgery Progress Note     LOS: 4 days   Patient Care Team:  Yusuf Jones MD as PCP - General  PhiJoshua tapia MD as Consulting Physician (Cardiology)  Deam, Moise Kathleen MD as Consulting Physician (Cardiac Electrophysiology)    Subjective     Interval History:   No major events overnight.  No nausea or vomiting.  Having loose stool.  Minimal abdominal pain.    Objective     Vital Signs  Temp:  [97.8 °F (36.6 °C)-98.3 °F (36.8 °C)] 98.3 °F (36.8 °C)  Heart Rate:  [70-78] 72  Resp:  [16-18] 18  BP: (124-174)/(69-92) 124/69    Physical Exam   Constitutional: He appears well-developed and well-nourished.   HENT:   Head: Normocephalic and atraumatic.   Cardiovascular: Normal rate.   Pulmonary/Chest: Effort normal.   Abdominal:   Soft minimally tender to palpation extensive abdominal hernia with loss of abdominal domain, scarring from previous surgeries noted no significant wounds          Results Review:    Lab Results (last 24 hours)     Procedure Component Value Units Date/Time    POC Glucose Once [[id number]]  (Abnormal) Collected:  08/08/20 0717    Specimen:  Blood Updated:  08/08/20 0718     Glucose 122 mg/dL      Comment: Serial Number: [id number]:  [id number]       BUN [[id number]]  (Normal) Collected:  08/08/20 0404    Specimen:  Blood Updated:  08/08/20 0714     BUN 11 mg/dL     Basic Metabolic Panel [[id number]]  (Abnormal) Collected:  08/08/20 0404    Specimen:  Blood Updated:  08/08/20 0501     Glucose 119 mg/dL      BUN --     Comment: Testing performed by alternate method        Creatinine 0.98 mg/dL      Sodium 139 mmol/L      Potassium 3.6 mmol/L      Chloride 101 mmol/L      CO2 25.0 mmol/L      Calcium 8.4 mg/dL      eGFR Non African Amer 75 mL/min/1.73      BUN/Creatinine Ratio --     Comment: Testing not performed        Anion Gap 13.0 mmol/L     Narrative:       GFR Normal >60  Chronic Kidney Disease <60  Kidney Failure <15      CBC & Differential [[id number]] Collected:   08/08/20 0404    Specimen:  Blood Updated:  08/08/20 0430    Narrative:       The following orders were created for panel order CBC & Differential.  Procedure                               Abnormality         Status                     ---------                               -----------         ------                     CBC Auto Differential[[id number]]        Abnormal            Final result                 Please view results for these tests on the individual orders.    CBC Auto Differential [[id number]]  (Abnormal) Collected:  08/08/20 0404    Specimen:  Blood Updated:  08/08/20 0430     WBC 6.20 10*3/mm3      RBC 4.07 10*6/mm3      Hemoglobin 12.6 g/dL      Hematocrit 36.3 %      MCV 89.2 fL      MCH 30.8 pg      MCHC 34.5 g/dL      RDW 15.4 %      RDW-SD 47.7 fl      MPV 7.9 fL      Platelets 294 10*3/mm3      Neutrophil % 60.7 %      Lymphocyte % 20.1 %      Monocyte % 9.4 %      Eosinophil % 8.5 %      Basophil % 1.3 %      Neutrophils, Absolute 3.70 10*3/mm3      Lymphocytes, Absolute 1.20 10*3/mm3      Monocytes, Absolute 0.60 10*3/mm3      Eosinophils, Absolute 0.50 10*3/mm3      Basophils, Absolute 0.10 10*3/mm3      nRBC 0.1 /100 WBC     POC Glucose Once [[id number]]  (Abnormal) Collected:  08/07/20 2155    Specimen:  Blood Updated:  08/07/20 2156     Glucose 116 mg/dL      Comment: Serial Number: [id number]:  [id number]       POC Glucose Once [[id number]]  (Abnormal) Collected:  08/07/20 1618    Specimen:  Blood Updated:  08/07/20 1622     Glucose 191 mg/dL      Comment: Serial Number: [id number]:  [id number]           Imaging Results (Last 24 Hours)     ** No results found for the last 24 hours. **         I reviewed the patient's new clinical results.    Medication Review:    Current Facility-Administered Medications:   •  albuterol (ACCUNEB) nebulizer solution 0.63 mg, 0.63 mg, Nebulization, Q2H PRN, Jovana Dupont,   •  aspirin EC tablet 325 mg, 325 mg, Oral, Daily, Jen Cole APRN,  325 mg at 08/08/20 0922  •  clopidogrel (PLAVIX) tablet 75 mg, 75 mg, Oral, Daily, Jen Cole APRN, 75 mg at 08/08/20 0922  •  dextrose (D50W) 25 g/ 50mL Intravenous Solution 25 g, 25 g, Intravenous, Q15 Min PRN, Jovana Dupont DO  •  dextrose (GLUTOSE) oral gel 15 g, 15 g, Oral, Q15 Min PRN, Jovana Dupont DO  •  diphenhydrAMINE (BENADRYL) injection 25 mg, 25 mg, Intravenous, Q6H PRN, Sasha Patel, APRN, 25 mg at 08/07/20 2158  •  enoxaparin (LOVENOX) syringe 40 mg, 40 mg, Subcutaneous, Q24H, Jovana Dupont DO, 40 mg at 08/07/20 1554  •  glucagon (human recombinant) (GLUCAGEN DIAGNOSTIC) injection 1 mg, 1 mg, Subcutaneous, PRN, Jovana Dupont DO  •  hydrALAZINE (APRESOLINE) injection 10 mg, 10 mg, Intravenous, Q6H PRN, Cass Alexander, APRN, 10 mg at 08/07/20 2222  •  HYDROmorphone (DILAUDID) injection 0.5 mg, 0.5 mg, Intravenous, Q6H PRN, Cleopatra Wang MD, 0.5 mg at 08/08/20 0614  •  insulin lispro (humaLOG) injection 0-7 Units, 0-7 Units, Subcutaneous, 4x Daily With Meals & Nightly, 2 Units at 08/07/20 2158 **AND** insulin lispro (humaLOG) injection 0-7 Units, 0-7 Units, Subcutaneous, PRN, Yusuf Jones MD  •  ipratropium-albuterol (DUO-NEB) nebulizer solution 3 mL, 3 mL, Nebulization, 4x Daily - RT, Jovana Dupont DO, 3 mL at 08/08/20 0700  •  lidocaine (LIDODERM) 5 % 2 patch, 2 patch, Transdermal, Q24H, Jovana Dupont DO, 2 patch at 08/08/20 0921  •  Magnesium Sulfate 2 gram Bolus, followed by 8 gram infusion (total Mg dose 10 grams)- Mg less than or equal to 1mg/dL, 2 g, Intravenous, PRN **OR** Magnesium Sulfate 2 gram / 50mL Infusion (GIVE X 3 BAGS TO EQUAL 6GM TOTAL DOSE) - Mg 1.1 - 1.5 mg/dl, 2 g, Intravenous, PRN **OR** Magnesium Sulfate 4 gram infusion- Mg 1.6-1.9 mg/dL, 4 g, Intravenous, PRN, Jovana Dupont DO, Last Rate: 25 mL/hr at 08/04/20 1137, 4 g at 08/04/20 1137  •  magnesium sulfate 2g/50 mL (PREMIX) infusion, 2 g, Intravenous, Once, Cass Alexander, KOURTNEY  •  metoprolol  tartrate (LOPRESSOR) tablet 25 mg, 25 mg, Oral, Q12H, Jen Cole, APRN, 25 mg at 08/08/20 0922  •  ondansetron (ZOFRAN) tablet 4 mg, 4 mg, Oral, Q6H PRN, 4 mg at 08/07/20 0340 **OR** ondansetron (ZOFRAN) injection 4 mg, 4 mg, Intravenous, Q6H PRN, Jovana Dupont A, DO, 4 mg at 08/06/20 0825  •  piperacillin-tazobactam (ZOSYN) IVPB 3.375 g in 100 mL NS (CD), 3.375 g, Intravenous, Q8H, Jovana Dupont A, DO, 3.375 g at 08/08/20 0613  •  potassium chloride 10 mEq in 100 mL IVPB, 10 mEq, Intravenous, Q1H PRN, Yanna Dupontn A, DO  •  potassium phosphate 45 mmol in sodium chloride 0.9 % 500 mL infusion, 45 mmol, Intravenous, PRN **OR** potassium phosphate 30 mmol in sodium chloride 0.9 % 250 mL infusion, 30 mmol, Intravenous, PRN **OR** potassium phosphate 15 mmol in sodium chloride 0.9 % 100 mL infusion, 15 mmol, Intravenous, PRN **OR** sodium phosphates 45 mmol in sodium chloride 0.9 % 500 mL IVPB, 45 mmol, Intravenous, PRN **OR** sodium phosphates 30 mmol in sodium chloride 0.9 % 250 mL IVPB, 30 mmol, Intravenous, PRN **OR** sodium phosphates 15 mmol in sodium chloride 0.9 % 250 mL IVPB, 15 mmol, Intravenous, PRN, DupontYannan A, DO  •  pregabalin (LYRICA) capsule 150 mg, 150 mg, Oral, BID, Yanna Dupontn A, DO, 150 mg at 08/08/20 0932  •  sodium chloride 0.9 % flush 10 mL, 10 mL, Intravenous, Q12H, Yanna Dupontn A, DO, 10 mL at 08/08/20 0925  •  sodium chloride 0.9 % flush 10 mL, 10 mL, Intravenous, PRN, Dupont Jovana A, DO    Assessment/Plan     Active Problems:    SBO (small bowel obstruction) (CMS/HCC)      Seems to be resolving spontaneously.  Okay for discharge from my standpoint  He need follow-up for ongoing discussion about his complex abdominal hernia if he continues to have recurrent obstructive symptoms.    This note was created using Dragon Voice Recognition software.    Connor Galindo MD  08/08/20  11:29

## 2020-08-09 ENCOUNTER — READMISSION MANAGEMENT (OUTPATIENT)
Dept: CALL CENTER | Facility: HOSPITAL | Age: 75
End: 2020-08-09

## 2020-08-09 NOTE — OUTREACH NOTE
Prep Survey      Responses   [facility] patient discharged from?  [facility]   Is LACE score < 7 ?  No   Eligibility  Readm Mgmt   Discharge diagnosis  Small bowel obstruction   COVID-19 Test Status  Negative   Does the patient have one of the following disease processes/diagnoses(primary or secondary)?  Other   Does the patient have Home health ordered?  No   Is there a DME ordered?  No   Comments regarding appointments  Per AVS   Prep survey completed?  Yes          Kamini Smith RN

## 2020-08-10 NOTE — PROGRESS NOTES
Case Management Discharge Note      Final Note: Anticipate routine discharge home with spouse at this time    Provided Post Acute Provider List?: N/A              Final Discharge Disposition Code: 01 - home or self-care

## 2020-08-11 ENCOUNTER — READMISSION MANAGEMENT (OUTPATIENT)
Dept: CALL CENTER | Facility: HOSPITAL | Age: 75
End: 2020-08-11

## 2020-08-13 ENCOUNTER — READMISSION MANAGEMENT (OUTPATIENT)
Dept: CALL CENTER | Facility: HOSPITAL | Age: 75
End: 2020-08-13

## 2020-08-13 NOTE — OUTREACH NOTE
Medical Week 1 Survey      Responses   [facility] patient discharged from?  [facility]   COVID-19 Test Status  Negative   Does the patient have one of the following disease processes/diagnoses(primary or secondary)?  Other   Is there a successful TCM telephone encounter documented?  No   Week 1 attempt successful?  No   Unsuccessful attempts  Attempt 2          Kayleigh Back LPN

## 2020-08-14 ENCOUNTER — READMISSION MANAGEMENT (OUTPATIENT)
Dept: CALL CENTER | Facility: HOSPITAL | Age: 75
End: 2020-08-14

## 2020-08-14 NOTE — OUTREACH NOTE
Medical Week 1 Survey      Responses   [facility] patient discharged from?  [facility]   COVID-19 Test Status  Negative   Does the patient have one of the following disease processes/diagnoses(primary or secondary)?  Other   Is there a successful TCM telephone encounter documented?  No   Week 1 attempt successful?  Yes   Call start time  1414   Call end time  1420   Meds reviewed with patient/caregiver?  Yes   Does the patient have all medications ordered at discharge?  N/A   Does the patient have a primary care provider?   Yes   Comments regarding PCP  PATIENT STATES HE HAS SEEN DR. CALLES AND HAS ANOTHER APPOINTMENT WITH HIM THIS COMING MONDAY 8/17   Has the patient kept scheduled appointments due by today?  Yes   Has home health visited the patient within 72 hours of discharge?  N/A   Pulse Ox monitoring  None   Did the patient receive a copy of their discharge instructions?  Yes   Nursing interventions  Reviewed instructions with patient   What is the patient's perception of their health status since discharge?  Improving   Is the patient/caregiver able to teach back signs and symptoms related to disease process for when to call PCP?  Yes   Is the patient/caregiver able to teach back signs and symptoms related to disease process for when to call 911?  Yes   Is the patient/caregiver able to teach back the hierarchy of who to call/visit for symptoms/problems? PCP, Specialist, Home health nurse, Urgent Care, ED, 911  Yes   Week 1 call completed?  Yes          Kayleigh Back LPN

## 2020-08-21 ENCOUNTER — READMISSION MANAGEMENT (OUTPATIENT)
Dept: CALL CENTER | Facility: HOSPITAL | Age: 75
End: 2020-08-21

## 2020-08-21 PROCEDURE — 93010 ELECTROCARDIOGRAM REPORT: CPT | Performed by: INTERNAL MEDICINE

## 2020-08-21 NOTE — OUTREACH NOTE
Medical Week 2 Survey      Responses   [facility] patient discharged from?  [facility]   COVID-19 Test Status  Negative   Does the patient have one of the following disease processes/diagnoses(primary or secondary)?  Other   Week 2 attempt successful?  No   Unsuccessful attempts  Attempt 1          Kayleigh Back LPN

## 2020-08-25 ENCOUNTER — READMISSION MANAGEMENT (OUTPATIENT)
Dept: CALL CENTER | Facility: HOSPITAL | Age: 75
End: 2020-08-25

## 2020-08-25 NOTE — OUTREACH NOTE
Medical Week 2 Survey      Responses   [facility] patient discharged from?  [facility]   COVID-19 Test Status  Negative   Does the patient have one of the following disease processes/diagnoses(primary or secondary)?  Other   Week 2 attempt successful?  No   Unsuccessful attempts  Attempt 2          Kayleigh Back LPN

## 2020-09-01 ENCOUNTER — READMISSION MANAGEMENT (OUTPATIENT)
Dept: CALL CENTER | Facility: HOSPITAL | Age: 75
End: 2020-09-01

## 2020-09-01 NOTE — OUTREACH NOTE
Medical Week 3 Survey      Responses   [facility] patient discharged from?  [facility]   COVID-19 Test Status  Negative   Does the patient have one of the following disease processes/diagnoses(primary or secondary)?  Other   Week 3 attempt successful?  No   Unsuccessful attempts  Attempt 1          Lali Sahu LPN

## 2020-09-03 ENCOUNTER — READMISSION MANAGEMENT (OUTPATIENT)
Dept: CALL CENTER | Facility: HOSPITAL | Age: 75
End: 2020-09-03

## 2020-09-03 NOTE — OUTREACH NOTE
Medical Week 3 Survey      Responses   [facility] patient discharged from?  [facility]   COVID-19 Test Status  Negative   Does the patient have one of the following disease processes/diagnoses(primary or secondary)?  Other   Week 3 attempt successful?  No   Unsuccessful attempts  Attempt 2          Kayleigh Back LPN

## 2020-10-02 ENCOUNTER — HOSPITAL ENCOUNTER (INPATIENT)
Facility: HOSPITAL | Age: 75
LOS: 5 days | Discharge: HOME | End: 2020-10-07
Attending: FAMILY MEDICINE | Admitting: FAMILY MEDICINE

## 2020-10-02 ENCOUNTER — APPOINTMENT (OUTPATIENT)
Dept: GENERAL RADIOLOGY | Facility: HOSPITAL | Age: 75
End: 2020-10-02

## 2020-10-02 ENCOUNTER — APPOINTMENT (OUTPATIENT)
Dept: CT IMAGING | Facility: HOSPITAL | Age: 75
End: 2020-10-02

## 2020-10-02 ENCOUNTER — APPOINTMENT (OUTPATIENT)
Dept: CARDIOLOGY | Facility: HOSPITAL | Age: 75
End: 2020-10-02

## 2020-10-02 DIAGNOSIS — L97.513 DIABETIC ULCER OF OTHER PART OF RIGHT FOOT ASSOCIATED WITH TYPE 2 DIABETES MELLITUS, WITH NECROSIS OF MUSCLE (HCC): Primary | ICD-10-CM

## 2020-10-02 DIAGNOSIS — E11.621 DIABETIC ULCER OF OTHER PART OF RIGHT FOOT ASSOCIATED WITH TYPE 2 DIABETES MELLITUS, WITH NECROSIS OF MUSCLE (HCC): Primary | ICD-10-CM

## 2020-10-02 PROBLEM — M86.9 OSTEOMYELITIS (HCC): Status: ACTIVE | Noted: 2020-10-02

## 2020-10-02 LAB
ANION GAP SERPL CALCULATED.3IONS-SCNC: 11 MMOL/L (ref 5–15)
BASOPHILS # BLD AUTO: 0.1 10*3/MM3 (ref 0–0.2)
BASOPHILS NFR BLD AUTO: 1.4 % (ref 0–1.5)
BH CV LOWER ARTERIAL LEFT ABI RATIO: 1.1
BH CV LOWER ARTERIAL LEFT DORSALIS PEDIS SYS MAX: 118 MMHG
BH CV LOWER ARTERIAL LEFT GREAT TOE SYS MAX: 106 MMHG
BH CV LOWER ARTERIAL LEFT POST TIBIAL SYS MAX: 146 MMHG
BH CV LOWER ARTERIAL LEFT TBI RATIO: 0.8
BH CV LOWER ARTERIAL RIGHT ABI RATIO: 1.16
BH CV LOWER ARTERIAL RIGHT DORSALIS PEDIS SYS MAX: 141 MMHG
BH CV LOWER ARTERIAL RIGHT GREAT TOE SYS MAX: 99 MMHG
BH CV LOWER ARTERIAL RIGHT POST TIBIAL SYS MAX: 154 MMHG
BH CV LOWER ARTERIAL RIGHT TBI RATIO: 0.74
BUN SERPL-MCNC: ABNORMAL MG/DL
BUN/CREAT SERPL: ABNORMAL
CALCIUM SPEC-SCNC: 8.9 MG/DL (ref 8.6–10.5)
CHLORIDE SERPL-SCNC: 102 MMOL/L (ref 98–107)
CO2 SERPL-SCNC: 26 MMOL/L (ref 22–29)
CREAT SERPL-MCNC: 1.15 MG/DL (ref 0.76–1.27)
CRP SERPL-MCNC: 2.53 MG/DL (ref 0–0.5)
DEPRECATED RDW RBC AUTO: 46.8 FL (ref 37–54)
EOSINOPHIL # BLD AUTO: 0.5 10*3/MM3 (ref 0–0.4)
EOSINOPHIL NFR BLD AUTO: 7.7 % (ref 0.3–6.2)
ERYTHROCYTE [DISTWIDTH] IN BLOOD BY AUTOMATED COUNT: 15 % (ref 12.3–15.4)
GFR SERPL CREATININE-BSD FRML MDRD: 62 ML/MIN/1.73
GLUCOSE BLDC GLUCOMTR-MCNC: 135 MG/DL (ref 70–105)
GLUCOSE BLDC GLUCOMTR-MCNC: 177 MG/DL (ref 70–105)
GLUCOSE BLDC GLUCOMTR-MCNC: 196 MG/DL (ref 70–105)
GLUCOSE SERPL-MCNC: 127 MG/DL (ref 65–99)
HBA1C MFR BLD: 6.6 % (ref 3.5–5.6)
HCT VFR BLD AUTO: 35.3 % (ref 37.5–51)
HGB BLD-MCNC: 12.2 G/DL (ref 13–17.7)
LYMPHOCYTES # BLD AUTO: 1.5 10*3/MM3 (ref 0.7–3.1)
LYMPHOCYTES NFR BLD AUTO: 21.6 % (ref 19.6–45.3)
MCH RBC QN AUTO: 30.5 PG (ref 26.6–33)
MCHC RBC AUTO-ENTMCNC: 34.5 G/DL (ref 31.5–35.7)
MCV RBC AUTO: 88.4 FL (ref 79–97)
MONOCYTES # BLD AUTO: 0.7 10*3/MM3 (ref 0.1–0.9)
MONOCYTES NFR BLD AUTO: 9.7 % (ref 5–12)
NEUTROPHILS NFR BLD AUTO: 4.1 10*3/MM3 (ref 1.7–7)
NEUTROPHILS NFR BLD AUTO: 59.6 % (ref 42.7–76)
NRBC BLD AUTO-RTO: 0 /100 WBC (ref 0–0.2)
PLATELET # BLD AUTO: 323 10*3/MM3 (ref 140–450)
PMV BLD AUTO: 7.9 FL (ref 6–12)
POTASSIUM SERPL-SCNC: 4.3 MMOL/L (ref 3.5–5.2)
RBC # BLD AUTO: 4 10*6/MM3 (ref 4.14–5.8)
SODIUM SERPL-SCNC: 139 MMOL/L (ref 136–145)
UPPER ARTERIAL LEFT ARM BRACHIAL SYS MAX: 131 MMHG
UPPER ARTERIAL RIGHT ARM BRACHIAL SYS MAX: 133 MMHG
WBC # BLD AUTO: 6.8 10*3/MM3 (ref 3.4–10.8)

## 2020-10-02 PROCEDURE — 86140 C-REACTIVE PROTEIN: CPT | Performed by: NURSE PRACTITIONER

## 2020-10-02 PROCEDURE — 94799 UNLISTED PULMONARY SVC/PX: CPT

## 2020-10-02 PROCEDURE — 87070 CULTURE OTHR SPECIMN AEROBIC: CPT | Performed by: PODIATRIST

## 2020-10-02 PROCEDURE — 80048 BASIC METABOLIC PNL TOTAL CA: CPT | Performed by: PODIATRIST

## 2020-10-02 PROCEDURE — 83036 HEMOGLOBIN GLYCOSYLATED A1C: CPT | Performed by: PODIATRIST

## 2020-10-02 PROCEDURE — 87040 BLOOD CULTURE FOR BACTERIA: CPT | Performed by: FAMILY MEDICINE

## 2020-10-02 PROCEDURE — 71045 X-RAY EXAM CHEST 1 VIEW: CPT

## 2020-10-02 PROCEDURE — 80048 BASIC METABOLIC PNL TOTAL CA: CPT | Performed by: FAMILY MEDICINE

## 2020-10-02 PROCEDURE — 25010000002 PIPERACILLIN SOD-TAZOBACTAM PER 1 G: Performed by: PODIATRIST

## 2020-10-02 PROCEDURE — 73610 X-RAY EXAM OF ANKLE: CPT

## 2020-10-02 PROCEDURE — 93922 UPR/L XTREMITY ART 2 LEVELS: CPT

## 2020-10-02 PROCEDURE — 87205 SMEAR GRAM STAIN: CPT | Performed by: PODIATRIST

## 2020-10-02 PROCEDURE — 82962 GLUCOSE BLOOD TEST: CPT

## 2020-10-02 PROCEDURE — 73630 X-RAY EXAM OF FOOT: CPT

## 2020-10-02 PROCEDURE — 87186 SC STD MICRODIL/AGAR DIL: CPT | Performed by: PODIATRIST

## 2020-10-02 PROCEDURE — 94640 AIRWAY INHALATION TREATMENT: CPT

## 2020-10-02 PROCEDURE — 73700 CT LOWER EXTREMITY W/O DYE: CPT

## 2020-10-02 PROCEDURE — 25010000002 VANCOMYCIN 10 G RECONSTITUTED SOLUTION: Performed by: PODIATRIST

## 2020-10-02 PROCEDURE — 85025 COMPLETE CBC W/AUTO DIFF WBC: CPT | Performed by: FAMILY MEDICINE

## 2020-10-02 PROCEDURE — 85025 COMPLETE CBC W/AUTO DIFF WBC: CPT | Performed by: PODIATRIST

## 2020-10-02 RX ORDER — DIGOXIN 250 MCG
250 TABLET ORAL
Status: DISCONTINUED | OUTPATIENT
Start: 2020-10-03 | End: 2020-10-07 | Stop reason: HOSPADM

## 2020-10-02 RX ORDER — IPRATROPIUM BROMIDE AND ALBUTEROL SULFATE 2.5; .5 MG/3ML; MG/3ML
3 SOLUTION RESPIRATORY (INHALATION)
Status: DISCONTINUED | OUTPATIENT
Start: 2020-10-02 | End: 2020-10-07 | Stop reason: HOSPADM

## 2020-10-02 RX ORDER — PREGABALIN 75 MG/1
150 CAPSULE ORAL 2 TIMES DAILY
Status: DISCONTINUED | OUTPATIENT
Start: 2020-10-02 | End: 2020-10-07 | Stop reason: HOSPADM

## 2020-10-02 RX ORDER — SODIUM CHLORIDE 9 MG/ML
10 INJECTION INTRAVENOUS EVERY 12 HOURS SCHEDULED
Status: DISCONTINUED | OUTPATIENT
Start: 2020-10-02 | End: 2020-10-02 | Stop reason: SDUPTHER

## 2020-10-02 RX ORDER — OXYCODONE HYDROCHLORIDE 5 MG/1
10 TABLET ORAL EVERY 6 HOURS PRN
Status: DISCONTINUED | OUTPATIENT
Start: 2020-10-02 | End: 2020-10-07 | Stop reason: HOSPADM

## 2020-10-02 RX ORDER — ATORVASTATIN CALCIUM 40 MG/1
40 TABLET, FILM COATED ORAL NIGHTLY
Status: DISCONTINUED | OUTPATIENT
Start: 2020-10-02 | End: 2020-10-07 | Stop reason: HOSPADM

## 2020-10-02 RX ORDER — POTASSIUM CHLORIDE 750 MG/1
10 TABLET, FILM COATED, EXTENDED RELEASE ORAL 2 TIMES DAILY
Status: DISCONTINUED | OUTPATIENT
Start: 2020-10-03 | End: 2020-10-07 | Stop reason: HOSPADM

## 2020-10-02 RX ORDER — DILTIAZEM HYDROCHLORIDE 120 MG/1
120 CAPSULE, COATED, EXTENDED RELEASE ORAL DAILY
Status: DISCONTINUED | OUTPATIENT
Start: 2020-10-03 | End: 2020-10-07 | Stop reason: HOSPADM

## 2020-10-02 RX ORDER — SODIUM CHLORIDE 0.9 % (FLUSH) 0.9 %
10 SYRINGE (ML) INJECTION EVERY 12 HOURS SCHEDULED
Status: DISCONTINUED | OUTPATIENT
Start: 2020-10-02 | End: 2020-10-07 | Stop reason: HOSPADM

## 2020-10-02 RX ORDER — LOSARTAN POTASSIUM 50 MG/1
100 TABLET ORAL DAILY
Status: DISCONTINUED | OUTPATIENT
Start: 2020-10-03 | End: 2020-10-07 | Stop reason: HOSPADM

## 2020-10-02 RX ADMIN — IPRATROPIUM BROMIDE AND ALBUTEROL SULFATE 3 ML: 2.5; .5 SOLUTION RESPIRATORY (INHALATION) at 23:34

## 2020-10-02 RX ADMIN — ATORVASTATIN CALCIUM 40 MG: 40 TABLET, FILM COATED ORAL at 23:57

## 2020-10-02 RX ADMIN — PREGABALIN 150 MG: 75 CAPSULE ORAL at 23:57

## 2020-10-02 RX ADMIN — VANCOMYCIN HYDROCHLORIDE 2000 MG: 10 INJECTION, POWDER, LYOPHILIZED, FOR SOLUTION INTRAVENOUS at 16:22

## 2020-10-02 RX ADMIN — PIPERACILLIN AND TAZOBACTAM 3.38 G: 3; .375 INJECTION, POWDER, LYOPHILIZED, FOR SOLUTION INTRAVENOUS at 15:25

## 2020-10-02 RX ADMIN — CEFEPIME HYDROCHLORIDE 2 G: 2 INJECTION, POWDER, FOR SOLUTION INTRAVENOUS at 23:58

## 2020-10-02 RX ADMIN — OXYCODONE HYDROCHLORIDE 10 MG: 5 TABLET ORAL at 19:06

## 2020-10-02 RX ADMIN — METOPROLOL TARTRATE 25 MG: 25 TABLET, FILM COATED ORAL at 23:57

## 2020-10-02 RX ADMIN — Medication 10 ML: at 21:20

## 2020-10-02 NOTE — CONSULTS
Name: Ro Nathan ADMIT: 10/2/2020   : 1945  PCP: Yusuf Jones MD    MRN: [id number] LOS: 0 days   AGE/SEX: 75 y.o. male  ROOM: [facility]/[address] [place] [address]      Patient Care Team:  Yusuf Jones MD as PCP - Joshua Evangelista MD as Consulting Physician (Cardiology)  Deam, Moise Kathleen MD as Consulting Physician (Cardiac Electrophysiology)  No chief complaint on file.    CC: Right foot wound    Subjective     Inpatient Vascular Surgery Consult  Consult performed by: Katty Hastings PA-C  Consult ordered by: Josef Egan DPM        History of Present Illness   Ro Nathan is a 75-year-old male patient who was directly admitted to [facility] for further evaluation of lateral right foot wound.  Mr. Nathan reports that the wound on his lateral right foot at the fifth metatarsal head began approximately 1 year ago however, over the last 3 weeks he noted changes to the area.  He denies having any trauma to the area and really is unsure of how the wound started.  We have been asked to assess arterial circulation in this very nice patient.    Past medical history A. Fib (rate controlled), COPD, CAD with pacer, hyperlipidemia, and hypertension.  He states that he has been told he has DM 2 but has not been placed on medical intervention or diet control.  Patient denies any past history of peripheral vascular intervention. Home medicines include aspirin and statin therapy.  He is a former smoker with a 15-pack-year history.      Review of Systems   Constitutional: Positive for activity change and fatigue. Negative for fever.   HENT: Negative.    Eyes: Negative.    Respiratory: Negative for cough and shortness of breath.    Cardiovascular: Negative for chest pain and leg swelling.   Gastrointestinal: Negative for abdominal pain, nausea and vomiting.   Musculoskeletal: Negative for back pain and gait problem.   Skin: Positive for wound. Negative for color change.   Neurological:  Negative for speech difficulty and headaches.   All other systems reviewed and are negative.      Past Medical History:   Diagnosis Date   • Bruises easily    • CHF (congestive heart failure) (CMS/[facility])    • Congenital heart defect    • Difficulty attaining erection    • Hypertension    • Low back pain    • Poor circulation    • Prostate cancer (CMS/[facility])    • Shortness of breath    • Stented coronary artery 2019    MICHAEL to LAD     Past Surgical History:   Procedure Laterality Date   • APPENDECTOMY     • CARDIAC CATHETERIZATION N/A 2019    Procedure: Left Heart Cath;  Surgeon: Mirza Munson MD;  Location: [facility] CATH INVASIVE LOCATION;  Service: Cardiology   • CARDIAC CATHETERIZATION N/A 2019    Procedure: Stent MICHAEL coronary;  Surgeon: Mirza Munson MD;  Location: [facility] CATH INVASIVE LOCATION;  Service: Cardiology   • CHOLECYSTECTOMY     • ELBOW PROCEDURE      left   • HERNIA REPAIR     • INTERVENTIONAL RADIOLOGY PROCEDURE N/A 2019    Procedure: Intravascular Ultrasound;  Surgeon: Mirza Munson MD;  Location: [facility] CATH INVASIVE LOCATION;  Service: Cardiology   • ID RT/LT HEART CATHETERS N/A 2019    Procedure: Percutaneous Coronary Intervention;  Surgeon: Mirza Munson MD;  Location: [facility] CATH INVASIVE LOCATION;  Service: Cardiology     Family History   Problem Relation Age of Onset   • Alzheimer's disease Mother    • GI problems Father    • Heart disease Father      Social History     Tobacco Use   • Smoking status: Former Smoker     Years: 15.00     Start date: 1966     Quit date: 5/10/2020     Years since quittin.3   • Smokeless tobacco: Never Used   Substance Use Topics   • Alcohol use: Not Currently     Frequency: Never   • Drug use: No     Medications Prior to Admission   Medication Sig Dispense Refill Last Dose   • aspirin 81 MG EC tablet Take 1 tablet by mouth Daily. 30 tablet 1 10/2/2020 at Unknown time   • atorvastatin  (LIPITOR) 40 MG tablet Take 1 tablet by mouth Every Night. 30 tablet 1 10/1/2020 at Unknown time   • digoxin (LANOXIN) 250 MCG tablet Take 250 mcg by mouth Daily.   10/2/2020 at Unknown time   • diltiaZEM CD (CARDIZEM CD) 120 MG 24 hr capsule Take 120 mg by mouth Daily.   10/2/2020 at Unknown time   • furosemide (LASIX) 40 MG tablet Take 40 mg by mouth Daily.   10/2/2020 at Unknown time   • losartan (COZAAR) 100 MG tablet Take 100 mg by mouth Daily.   10/2/2020 at Unknown time   • LYRICA 150 MG capsule Take 150 mg by mouth 2 (Two) Times a Day.   10/2/2020 at Unknown time   • metoprolol tartrate (LOPRESSOR) 25 MG tablet Take 1 tablet by mouth Every 12 (Twelve) Hours. (Patient taking differently: Take 25 mg by mouth Every 12 (Twelve) Hours. Verified with [facility] pharmacy) 60 tablet 1 10/2/2020 at Unknown time   • mirtazapine (REMERON) 30 MG tablet Take 30 mg by mouth Every Night.   10/1/2020 at Unknown time   • oxyCODONE-acetaminophen (Percocet)  MG per tablet Take 1 tablet by mouth Every 6 (Six) Hours As Needed for Moderate Pain . 120 tablet 0 10/2/2020 at Unknown time   • potassium chloride (K-DUR) 10 MEQ CR tablet Take 10 mEq by mouth 2 (Two) Times a Day.   10/2/2020 at Unknown time   • prasugrel (EFFIENT) 10 MG tablet Take 10 mg by mouth Daily.   10/2/2020 at Unknown time   • traZODone (DESYREL) 100 MG tablet Take 100 mg by mouth Every Night.   10/1/2020 at Unknown time   • VENTOLIN  (90 Base) MCG/ACT inhaler Inhale 2 puffs Every 6 (Six) Hours As Needed for Wheezing or Shortness of Air.   10/1/2020 at Unknown time     Pharmacy to dose vancomycin, , Does not apply, Q12H  Pharmacy to Dose Zosyn, , Does not apply, Q8H  piperacillin-tazobactam, 3.375 g, Intravenous, Once  piperacillin-tazobactam, 3.375 g, Intravenous, Q8H           Haloperidol, Morphine, and Pantoprazole    Objective     Physical Exam:  Physical Exam  Constitutional:       Appearance: He is well-developed.      Comments: I have reviewed  the vital signs listed in this exam.   Eyes:      Conjunctiva/sclera: Conjunctivae normal.   Cardiovascular:      Rate and Rhythm: Normal rate and regular rhythm.      Pulses:           Carotid pulses are 2+ on the right side and 2+ on the left side.       Radial pulses are 2+ on the right side and 2+ on the left side.        Femoral pulses are 2+ on the right side and 2+ on the left side.       Dorsalis pedis pulses are 2+ on the right side and 2+ on the left side.        Posterior tibial pulses are 2+ on the right side and 2+ on the left side.      Heart sounds: Normal heart sounds.      Comments: PMI normal  No Abdominal aortic aneurysm is palpable.   Patient has palpable femoral, DP and PT bilaterally.  Good Doppler signals present in DP and PT bilaterally    Pulmonary:      Effort: Pulmonary effort is normal. No respiratory distress.      Breath sounds: Normal breath sounds.   Abdominal:      General: There is no distension.      Palpations: Abdomen is soft.      Tenderness: There is no abdominal tenderness. There is no guarding.      Comments: No hepatosplenomegaly is palpable.      Musculoskeletal:         General: No deformity.      Comments: Normal muscular tone of the four extremities without flaccidity, atrophy, or abnormal movements.     Inspection of the digits and nails is negative for clubbing, cyanosis, infection, or other pathology.    Skin:     General: Skin is warm and dry.      Findings: No rash.   Neurological:      Mental Status: He is alert and oriented to person, place, and time.            Vital Signs and Labs:  Vital Signs No data found.    I/O:  No intake/output data recorded.  BMI:  There is no height or weight on file to calculate BMI.    CBC    Results from last 7 days   Lab Units 10/02/20  1401   WBC 10*3/mm3 6.80   HEMOGLOBIN g/dL 12.2*   PLATELETS 10*3/mm3 323     BMP     Coag     HbA1C   Lab Results   Component Value Date    HGBA1C 5.6 08/04/2020     Infection     Radiology(recent)  Xr Ankle 3+ View Right    Result Date: 10/2/2020   1. Normal right ankle. No acute osseous abnormality or evidence of osteomyelitis of the right ankle. 2. No  subcutaneous gas is seen.  Electronically Signed By-Dr. Ekaterina Goins MD On:10/2/2020 12:32 PM This report was finalized on [id number] by Dr. Ekaterina Goins MD.    Xr Foot 3+ View Right    Result Date: 10/2/2020  Findings consistent with ostomy myelitis with osseous destruction along lateral margin of the fifth metatarsal head. Small foci of soft tissue gas suggest soft tissue infection at this location as well.  Electronically Signed By-Antoinette August MD On:10/2/2020 12:32 PM This report was finalized on [id number] by  Antoinette August MD.        Active Hospital Problems    Diagnosis  POA   • Osteomyelitis (CMS/[facility]) [M86.9]  Yes      Resolved Hospital Problems   No resolved problems to display.       [address]    Assessment/Plan       Osteomyelitis (CMS/[facility])        75 y.o. male who we have been asked to assess arterial circulation in patient with foot wound.     On physical exam, patient has palpable pedal pulses bilaterally with good Doppler signals.    Lower extremity arterial study demonstrates triphasic waveforms bilaterally. Right RASHARD 1.16 and left 1.1    X-ray demonstrates osteomyelitis with osseous destruction along the lateral margin of fifth metatarsal head, small foci of soft tissue gas noted.      Infectious disease has been consulted  - Vancomycin and Zosyn  - wbc 6.80  - Betadine paint is being applied to the area, 4 x 4, Kerlix with Ace wrap.    Podiatry consulted.    Patient has adequate BLE perfusion, no vascular surgery recommended.     We will sign off and see upon request.     Personal protective equipment used for this patient encounter:  Patient wearing surgical mask []    Provider wearing a surgical mask [x]    Gloves [x]    Eye protection []    Face Shield []    Gown []    N 95 respirator or CAPR/PAPR []       I discussed the patients  findings and my recommendations with patient.    Katty Marsh PA-C  10/02/20  14:04 EDT    Please call my office with any question: [phone]

## 2020-10-02 NOTE — PLAN OF CARE
Goal Outcome Evaluation:         Patient came in for evaluation of right lateral foot. Wound culture obtained, betadine w-d dressing applied. Xrays of the foot obtained- osteomyelitis noted. ID, Podiatry, and Vascular Sx. Consulted. Antibiotics started- see MAR.

## 2020-10-02 NOTE — PROGRESS NOTES
Pharmacy Antimicrobial Dosing Service    Subjective:  Ro Nathan is a 75 y.o.male admitted with osteomyelitis. Pharmacy has been consulted to dose Vancomycin for possible SSTI.        Assessment/Plan    1. Day #1 Vancomycin: Goal -600 mcg*h/mL.   Will give load dose of 2000mg x1 (~21 mg/kg DBW) followed by maintenance dosing of 1500mg Q12h (~16 mg/kg DBW). Obtain trough 10/4 04:00, at steady state prior to 4th total dose (or sooner if clinically warranted). Obtain random level 10/3 21:00, for pharmacist to calculate patient-specific pharmacokinetic parameters and AUC.    2. Day #1 Piperacillin/Tazobactam: 4.5gm IV q6h (30-min infusion d/t incompatibitlities w/vanc) for estCrCl >40 ml/min.    Monitor renal fxn closely d/t increased risk of nephrotoxicity w/concomitant vanc/pip-tazo.    Will continue to monitor drug levels, renal function, culture and sensitivities, and patient clinical status.       Objective:  Relevant clinical data and objective history reviewed:      119 kg (262 lb)   Ideal body weight: 77.6 kg (171 lb 1.2 oz)  Adjusted ideal body weight: 94.1 kg (207 lb 7.1 oz)  Body mass index is 35.53 kg/m².        Results from last 7 days   Lab Units 10/02/20  1401   CREATININE mg/dL 1.15     Estimated Creatinine Clearance: 75.1 mL/min (by C-G formula based on SCr of 1.15 mg/dL).  No intake/output data recorded.    Results from last 7 days   Lab Units 10/02/20  1401   WBC 10*3/mm3 6.80     There were no vitals filed for this visit.  Baseline culture/source/susceptibility:  Microbiology Results (last 10 days)       ** No results found for the last 240 hours. **             Anti-Infectives (From admission, onward)      Ordered     Dose/Rate Route Frequency Start Stop    10/02/20 1326  piperacillin-tazobactam (ZOSYN) IVPB 3.375 g in 100 mL NS (CD)     Ordering Provider: Josef Egan DPM    3.375 g  over 30 Minutes Intravenous Every 6 Hours 10/02/20 2100 10/07/20 2059    10/02/20 1326  Pharmacy  to dose vancomycin     Ordering Provider: Josef Egan DPM     Does not apply Every 12 Hours 10/02/20 1415 10/07/20 1414    10/02/20 1326  Pharmacy to Dose Zosyn     Ordering Provider: Josef Egan DPM     Does not apply Every 8 Hours 10/02/20 1415 10/07/20 1414    10/02/20 1326  piperacillin-tazobactam (ZOSYN) IVPB 3.375 g in 100 mL NS (CD)     Ordering Provider: Josef Egan DPM    3.375 g  over 30 Minutes Intravenous Once 10/02/20 1345              Kizzy Navarro PharmD, BCPS  10/02/20 15:44 EDT

## 2020-10-02 NOTE — NURSING NOTE
Patient unable to have MRI due to current [employer] pacemaker- verified with [employer] and MRI Tech Vasyl.

## 2020-10-02 NOTE — PLAN OF CARE
Goal Outcome Evaluation:         Will cont. To monitor. Ongoing testing on patients right lateral foot wound. Dressing applied. Betadine w-d

## 2020-10-02 NOTE — CONSULTS
"Foot and Ankle Surgery Consult Note    Referring Provider: Dr. Jones  Reason for Consultation: Osteomyelitis right foot    Patient Care Team:  Yusuf Jones MD as PCP - General  PhiJoshua MD as Consulting Physician (Cardiology)  Deam, Moise Kathleen MD as Consulting Physician (Cardiac Electrophysiology)      Subjective .     History of present illness:  Ro Nathan is a 75 y.o. male who presents with worsening and non-healing right foot wound. The patient was seen in my office this morning and was advised to come to the hospital. The patient has had a wound to the outside of his right foot at the level of his 5th metatarsal head. He states that he has had a \"knot\" on his foot for about 1 year but recently has noticed an opening. He denies seeing anyone for this or any other self-treatment. He did see Dr. Jones on 9/30/20 and was given bactroban ointment which the patient stated he applied as directed without issues of complaints. He denied current or recent nausea, fever, vomiting, chills. He denies any pain to either of his feet. He did relate to me that he is diabetic but does not take any medications for it. Aside from his right foot wound, he denied any other pedal complaints.      Review of Systems:   Integument: Right foot wound with periwound erythema.      History  Past Medical History:   Diagnosis Date   • Bruises easily    • CHF (congestive heart failure) (CMS/HCC)    • Congenital heart defect    • Difficulty attaining erection    • Hypertension    • Low back pain    • Poor circulation    • Prostate cancer (CMS/HCC)    • Shortness of breath    • Stented coronary artery 12/05/2019    MICHAEL to LAD     Past Surgical History:   Procedure Laterality Date   • APPENDECTOMY     • CARDIAC CATHETERIZATION N/A 12/5/2019    Procedure: Left Heart Cath;  Surgeon: Mirza Munson MD;  Location: [facility] CATH INVASIVE LOCATION;  Service: Cardiology   • CARDIAC CATHETERIZATION N/A 12/5/2019 "    Procedure: Stent MICHAEL coronary;  Surgeon: Mirza Munson MD;  Location: [facility] CATH INVASIVE LOCATION;  Service: Cardiology   • CHOLECYSTECTOMY     • ELBOW PROCEDURE      left   • HERNIA REPAIR     • INTERVENTIONAL RADIOLOGY PROCEDURE N/A 2019    Procedure: Intravascular Ultrasound;  Surgeon: Mirza Munson MD;  Location: [facility] CATH INVASIVE LOCATION;  Service: Cardiology   • NH RT/LT HEART CATHETERS N/A 2019    Procedure: Percutaneous Coronary Intervention;  Surgeon: Mirza Munson MD;  Location: [facility] CATH INVASIVE LOCATION;  Service: Cardiology     Family History   Problem Relation Age of Onset   • Alzheimer's disease Mother    • GI problems Father    • Heart disease Father       Social History     Tobacco Use   • Smoking status: Former Smoker     Years: 15.00     Start date: 1966     Quit date: 5/10/2020     Years since quittin.3   • Smokeless tobacco: Never Used   Substance Use Topics   • Alcohol use: Not Currently     Frequency: Never   • Drug use: No     Medications Prior to Admission   Medication Sig Dispense Refill Last Dose   • aspirin 81 MG EC tablet Take 1 tablet by mouth Daily. 30 tablet 1 10/2/2020 at Unknown time   • atorvastatin (LIPITOR) 40 MG tablet Take 1 tablet by mouth Every Night. 30 tablet 1 10/1/2020 at Unknown time   • digoxin (LANOXIN) 250 MCG tablet Take 250 mcg by mouth Daily.   10/2/2020 at Unknown time   • diltiaZEM CD (CARDIZEM CD) 120 MG 24 hr capsule Take 120 mg by mouth Daily.   10/2/2020 at Unknown time   • furosemide (LASIX) 40 MG tablet Take 40 mg by mouth Daily.   10/2/2020 at Unknown time   • losartan (COZAAR) 100 MG tablet Take 100 mg by mouth Daily.   10/2/2020 at Unknown time   • LYRICA 150 MG capsule Take 150 mg by mouth 2 (Two) Times a Day.   10/2/2020 at Unknown time   • metoprolol tartrate (LOPRESSOR) 25 MG tablet Take 1 tablet by mouth Every 12 (Twelve) Hours. (Patient taking differently: Take 25 mg by mouth  Every 12 (Twelve) Hours. Verified with [facility] pharmacy) 60 tablet 1 10/2/2020 at Unknown time   • mirtazapine (REMERON) 30 MG tablet Take 30 mg by mouth Every Night.   10/1/2020 at Unknown time   • oxyCODONE-acetaminophen (Percocet)  MG per tablet Take 1 tablet by mouth Every 6 (Six) Hours As Needed for Moderate Pain . 120 tablet 0 10/2/2020 at Unknown time   • potassium chloride (K-DUR) 10 MEQ CR tablet Take 10 mEq by mouth 2 (Two) Times a Day.   10/2/2020 at Unknown time   • prasugrel (EFFIENT) 10 MG tablet Take 10 mg by mouth Daily.   10/2/2020 at Unknown time   • traZODone (DESYREL) 100 MG tablet Take 100 mg by mouth Every Night.   10/1/2020 at Unknown time   • VENTOLIN  (90 Base) MCG/ACT inhaler Inhale 2 puffs Every 6 (Six) Hours As Needed for Wheezing or Shortness of Air.   10/1/2020 at Unknown time      Haloperidol, Morphine, and Pantoprazole    Scheduled Meds:[START ON 10/4/2020] !Vancomycin Level Draw Needed, , Does not apply, Once  [START ON 10/3/2020] !Vancomycin Level Draw Needed, , Does not apply, Once  [START ON 10/3/2020] cefepime, 2 g, Intravenous, Q8H  Pharmacy to dose vancomycin, , Does not apply, Q12H  sodium chloride, 10 mL, Intravenous, Q12H  [START ON 10/3/2020] vancomycin, 1,500 mg, Intravenous, Q12H      Continuous Infusions:   PRN Meds:.oxyCODONE    Objective     Vital Signs   Vitals:    10/02/20 1500   Weight: 119 kg (262 lb)         Physical Exam:   Bilateral lower extremities were examined. DP PT pulses are nonpalpable, bilaterally. Cap fill time is sluggish, less than 4 seconds, to all digits, bilaterally. He is able to wiggle all his toes without pain or difficulty. Epicritic sensation is diminished to the level of his digits, bilaterally. Muscle strength is 5/5 in all directions crossing the ankle joint. Range of motion about his ankle and all other joints of the foot is smooth and pain-free. The styloid of his 5th metatarsal base appears prominent bilaterally but there  are no wounds or hyperkeratoses noted in these areas. The wound is noted to the lateral aspect of his 5th metatarsal head and measures 3.0 x 3.0 x 0.3 cm to the level of bone. There is a positive probe to bone test. The wound is nummular with indurated edges. The wound base is 90/10 fibrotic/granular. There is no malodor, lymphangitis, purulence appreciated. There is a mild amount of roxanne-wound erythema. Skin temperature is slightly increased to his right lower extremity compared to his left.        Results Review:   I reviewed the patient's new clinical results.  I reviewed the patient's new imaging results    Lab Results (last 24 hours)     Procedure Component Value Units Date/Time    POC Glucose Once [[id number]]  (Abnormal) Collected: 10/02/20 1826    Specimen: Blood Updated: 10/02/20 1827     Glucose 196 mg/dL      Comment: Serial Number: [id number]:  [id number]       C-reactive Protein [[id number]]  (Abnormal) Collected: 10/02/20 1401    Specimen: Blood Updated: 10/02/20 1723     C-Reactive Protein 2.53 mg/dL     Wound Culture - Wound, Foot, Right [[id number]] Collected: 10/02/20 1329    Specimen: Wound from Foot, Right Updated: 10/02/20 1550     Gram Stain Less than 10 WBCs seen      Rare (1+) Gram positive cocci      Rare (1+) Gram negative bacilli    Basic Metabolic Panel [[id number]]  (Abnormal) Collected: 10/02/20 1401    Specimen: Blood Updated: 10/02/20 1501     Glucose 127 mg/dL      BUN --     Comment: Testing performed by alternate method        Creatinine 1.15 mg/dL      Sodium 139 mmol/L      Potassium 4.3 mmol/L      Chloride 102 mmol/L      CO2 26.0 mmol/L      Calcium 8.9 mg/dL      eGFR Non African Amer 62 mL/min/1.73      BUN/Creatinine Ratio --     Comment: Testing not performed        Anion Gap 11.0 mmol/L     Narrative:      GFR Normal >60  Chronic Kidney Disease <60  Kidney Failure <15      BUN [[id number]] Collected: 10/02/20 1401    Specimen: Blood Updated: 10/02/20 1501    CBC &  Differential [[id number]]  (Abnormal) Collected: 10/02/20 1401    Specimen: Blood Updated: 10/02/20 1420    Narrative:      The following orders were created for panel order CBC & Differential.  Procedure                               Abnormality         Status                     ---------                               -----------         ------                     CBC Auto Differential[[id number]]        Abnormal            Final result                 Please view results for these tests on the individual orders.    CBC Auto Differential [[id number]]  (Abnormal) Collected: 10/02/20 1401    Specimen: Blood Updated: 10/02/20 1420     WBC 6.80 10*3/mm3      RBC 4.00 10*6/mm3      Hemoglobin 12.2 g/dL      Hematocrit 35.3 %      MCV 88.4 fL      MCH 30.5 pg      MCHC 34.5 g/dL      RDW 15.0 %      RDW-SD 46.8 fl      MPV 7.9 fL      Platelets 323 10*3/mm3      Neutrophil % 59.6 %      Lymphocyte % 21.6 %      Monocyte % 9.7 %      Eosinophil % 7.7 %      Basophil % 1.4 %      Neutrophils, Absolute 4.10 10*3/mm3      Lymphocytes, Absolute 1.50 10*3/mm3      Monocytes, Absolute 0.70 10*3/mm3      Eosinophils, Absolute 0.50 10*3/mm3      Basophils, Absolute 0.10 10*3/mm3      nRBC 0.0 /100 WBC     Hemoglobin A1c [[id number]] Collected: 10/02/20 1401    Specimen: Blood Updated: 10/02/20 1417    POC Glucose Once [[id number]]  (Abnormal) Collected: 10/02/20 1252    Specimen: Blood Updated: 10/02/20 1253     Glucose 135 mg/dL      Comment: Serial Number: [id number]:  [id number]             Imaging Results (Last 24 Hours)     Procedure Component Value Units Date/Time    CT Lower Extremity Right Without Contrast [[id number]] Resulted: 10/02/20 1725     Updated: 10/02/20 1745    XR Ankle 3+ View Right [[id number]] Collected: 10/02/20 1230     Updated: 10/02/20 1234    Narrative:      DATE OF EXAM:  10/2/2020 12:07 PM     PROCEDURE:  XR ANKLE 3+ VW RIGHT-     INDICATIONS:  open wound right foot, 5th metatarsal head. Assess for  soft tissue  emphysema.     COMPARISON:  Right foot radiographs 11/02/2020     TECHNIQUE:   3 views of the right ankle(s) was/were obtained.     FINDINGS:  No right ankle fracture or joint dislocation is seen. No evidence of  osteomyelitis in the right ankle. No retained radiopaque foreign body.  No subcutaneous gas. Small enthesophytes at the Achilles and plantar  tendon insertions upon the posterior calcaneus.        Impression:         1. Normal right ankle. No acute osseous abnormality or evidence of  osteomyelitis of the right ankle.  2. No  subcutaneous gas is seen.     Electronically Signed By-Dr. Ekaterina Goins MD On:10/2/2020 12:32 PM  This report was finalized on [id number] by Dr. Ekaterina Goins MD.    XR Foot 3+ View Right [[id number]] Collected: 10/02/20 1231     Updated: 10/02/20 1234    Narrative:      DATE OF EXAM:  10/2/2020 12:08 PM     PROCEDURE:  XR FOOT 3+ VW RIGHT-     INDICATIONS:  open wound right foot, 5th metatarsal head     COMPARISON:  Foot radiograph 8/16/2018     TECHNIQUE:   A minimum of three routine standard radiographic views were obtained of  the right foot.     FINDINGS:  There is bandage material along the foot which limits fine osseous  detail. There is soft tissue gas focus at the head of the fifth  metatarsal. There is indistinct margins of the cortex at the lateral  aspect head of the fifth metatarsal. This is consistent with  osteomyelitis given the clinical history. Soft tissue infection is also  likely given the presence of small amount of soft tissue gas. There are  hammertoe deformities with abnormal alignment of the second third fourth  metatarsal phalange joints. There is abnormal alignment at the first  metatarsal phalange joint. There is mild diffuse soft tissue swelling.  There is osteopenia. There is mild arthritis of the tarsometatarsal  joints and talonavicular joint. There is a small plantar calcaneal spur.  There is a small enthesophyte Achilles tendon  insertion site.        Impression:      Findings consistent with ostomy myelitis with osseous destruction along  lateral margin of the fifth metatarsal head. Small foci of soft tissue  gas suggest soft tissue infection at this location as well.     Electronically Signed By-Antoinette August MD On:10/2/2020 12:32 PM  This report was finalized on [id number] by  Antoinette August MD.            Assessment/Plan     -Osteomyelitis, right foot  -Type 2 diabetes mellitus with peripheral neuropathy  -Diabetic foot ulcer with exposed bone, right foot  -Cellulitis of right foot    -Continue antibiotics per infectious disease. Final cx pending.  -RASHARD reviewed and show adequate perfusion to digits. Vascular surgery has signed off.   -XR show osteomyelitis with small foci of gas. I believe this is environmental as his wound does appear stable with no signs of necrotizing infection. CT pending for further evaluation of extent of osteomyelitis.   -I do request he be medically optimized because it is likely the patient will have to undergo, at the very least, a debridement of his right foot.   -NWB to right foot.  -Dressing to remain clean/dry/intact.  -I will continue to follow closely.    Josef Egan DPM  10/02/20  18:38 EDT

## 2020-10-02 NOTE — CONSULTS
Infectious Diseases Consult Note    Referring Provider: Dr. Egan    Reason for Consultation: Right diabetic foot wound    Patient Care Team:  Yusuf Jones MD as PCP - General  PhiJoshua tapia MD as Consulting Physician (Cardiology)  Amberm, Moise Kathleen MD as Consulting Physician (Cardiac Electrophysiology)    Chief complaint right foot wound    Subjective     The patient not had a temperature taken since admission yet..  The patient is on room air, hemodynamically stable, and is tolerating antimicrobial therapy.    History of present illness:      This is a 75-year-old male who presents the hospital on 10/2/2020 from his podiatrist office, Dr. Egan.  Patient had a wound that was kind of like a bad callus on his right foot near his fifth toe for approximately a year but over the last 3 weeks he noticed that it opened up and started to drain.  Patient denies fever, chills, diaphoresis, shortness of breath, cough, GI symptoms, or any urinary symptoms.    Patient is currently on room air and does not appear to be in acute distress.  Patient has a large open wound to the lateral aspect of his right foot by his fifth toe.  There is some surrounding area wound erythema but no significant erythema to the right foot.  Patient has not had a temperature taken yet that is been charted.  Creatinine is 1.15, white blood cell count is 6.8, hemoglobin is 12.2, platelets 323.  Wound culture has been taken and is pending.  Strays of right ankle and right foot show osteomyelitis along the lateral margin of the fifth metatarsal head.  ABIs show normal digital pressures.  Patient is currently on IV vancomycin IV Zosyn and has no known antibiotic allergies.    Patient has a past medical history significant for A. fib, COPD, CAD, pacemaker placement, hyperlipidemia, hypertension, prostate cancer congestive heart failure, coronary stents, hernia repair, left elbow surgery, cholecystectomy, and appendectomy.  Patient  states he has been told recently that he has type 2 diabetes but has not started any kind of treatment.  Patient states he just quit smoking approximately a month ago and denies any alcohol or illicit drug abuse.    Review of Systems   Review of Systems   Constitutional: Negative.    HENT: Negative.    Eyes: Negative.    Respiratory: Negative.    Cardiovascular: Negative.    Gastrointestinal: Negative.    Endocrine: Negative.    Genitourinary: Negative.    Musculoskeletal: Negative.    Skin: Positive for wound.   Neurological: Negative.    Psychiatric/Behavioral: Negative.    All other systems reviewed and are negative.      Medications  Medications Prior to Admission   Medication Sig Dispense Refill Last Dose   • aspirin 81 MG EC tablet Take 1 tablet by mouth Daily. 30 tablet 1 10/2/2020 at Unknown time   • atorvastatin (LIPITOR) 40 MG tablet Take 1 tablet by mouth Every Night. 30 tablet 1 10/1/2020 at Unknown time   • digoxin (LANOXIN) 250 MCG tablet Take 250 mcg by mouth Daily.   10/2/2020 at Unknown time   • diltiaZEM CD (CARDIZEM CD) 120 MG 24 hr capsule Take 120 mg by mouth Daily.   10/2/2020 at Unknown time   • furosemide (LASIX) 40 MG tablet Take 40 mg by mouth Daily.   10/2/2020 at Unknown time   • losartan (COZAAR) 100 MG tablet Take 100 mg by mouth Daily.   10/2/2020 at Unknown time   • LYRICA 150 MG capsule Take 150 mg by mouth 2 (Two) Times a Day.   10/2/2020 at Unknown time   • metoprolol tartrate (LOPRESSOR) 25 MG tablet Take 1 tablet by mouth Every 12 (Twelve) Hours. (Patient taking differently: Take 25 mg by mouth Every 12 (Twelve) Hours. Verified with [facility] pharmacy) 60 tablet 1 10/2/2020 at Unknown time   • mirtazapine (REMERON) 30 MG tablet Take 30 mg by mouth Every Night.   10/1/2020 at Unknown time   • oxyCODONE-acetaminophen (Percocet)  MG per tablet Take 1 tablet by mouth Every 6 (Six) Hours As Needed for Moderate Pain . 120 tablet 0 10/2/2020 at Unknown time   • potassium chloride  (K-DUR) 10 MEQ CR tablet Take 10 mEq by mouth 2 (Two) Times a Day.   10/2/2020 at Unknown time   • prasugrel (EFFIENT) 10 MG tablet Take 10 mg by mouth Daily.   10/2/2020 at Unknown time   • traZODone (DESYREL) 100 MG tablet Take 100 mg by mouth Every Night.   10/1/2020 at Unknown time   • VENTOLIN  (90 Base) MCG/ACT inhaler Inhale 2 puffs Every 6 (Six) Hours As Needed for Wheezing or Shortness of Air.   10/1/2020 at Unknown time       History  Past Medical History:   Diagnosis Date   • Bruises easily    • CHF (congestive heart failure) (CMS/[facility])    • Congenital heart defect    • Difficulty attaining erection    • Hypertension    • Low back pain    • Poor circulation    • Prostate cancer (CMS/[facility])    • Shortness of breath    • Stented coronary artery 12/05/2019    MICHAEL to LAD     Past Surgical History:   Procedure Laterality Date   • APPENDECTOMY     • CARDIAC CATHETERIZATION N/A 12/5/2019    Procedure: Left Heart Cath;  Surgeon: Mirza Munson MD;  Location: [facility] CATH INVASIVE LOCATION;  Service: Cardiology   • CARDIAC CATHETERIZATION N/A 12/5/2019    Procedure: Stent MICHAEL coronary;  Surgeon: Mirza Munson MD;  Location: [facility] CATH INVASIVE LOCATION;  Service: Cardiology   • CHOLECYSTECTOMY     • ELBOW PROCEDURE      left   • HERNIA REPAIR     • INTERVENTIONAL RADIOLOGY PROCEDURE N/A 12/5/2019    Procedure: Intravascular Ultrasound;  Surgeon: Mirza Munson MD;  Location: [facility] CATH INVASIVE LOCATION;  Service: Cardiology   • NH RT/LT HEART CATHETERS N/A 12/5/2019    Procedure: Percutaneous Coronary Intervention;  Surgeon: Mirza Munson MD;  Location: [facility] CATH INVASIVE LOCATION;  Service: Cardiology       Family History  Family History   Problem Relation Age of Onset   • Alzheimer's disease Mother    • GI problems Father    • Heart disease Father        Social History   reports that he quit smoking about 4 months ago. He started smoking about 54 years  ago. He quit after 15.00 years of use. He has never used smokeless tobacco. He reports previous alcohol use. He reports that he does not use drugs.    Allergies  Haloperidol, Morphine, and Pantoprazole    Objective     Vital Signs   Vital Signs (last 24 hours)     None          Physical Exam:  Physical Exam  Vitals signs and nursing note reviewed.   Constitutional:       General: He is not in acute distress.     Appearance: Normal appearance. He is well-developed. He is obese. He is not diaphoretic.   HENT:      Head: Normocephalic and atraumatic.   Eyes:      Conjunctiva/sclera: Conjunctivae normal.      Pupils: Pupils are equal, round, and reactive to light.   Neck:      Musculoskeletal: Neck supple.   Cardiovascular:      Rate and Rhythm: Normal rate and regular rhythm.      Heart sounds: Normal heart sounds, S1 normal and S2 normal.   Pulmonary:      Effort: Pulmonary effort is normal. No respiratory distress.      Breath sounds: Normal breath sounds. No stridor. No wheezing or rales.   Abdominal:      General: Bowel sounds are normal. There is no distension.      Palpations: Abdomen is soft. There is no mass.      Tenderness: There is no abdominal tenderness. There is no guarding.   Musculoskeletal: Normal range of motion.         General: No deformity.      Comments: Degenerative changes    Patient has a large ulcer to the lateral aspect of his right foot by his fifth metatarsal that is open with some yellow slough and serosanguineous drainage.   Skin:     General: Skin is warm and dry.      Coloration: Skin is not pale.      Findings: No erythema or rash.   Neurological:      Mental Status: He is alert and oriented to person, place, and time.      Cranial Nerves: No cranial nerve deficit.   Psychiatric:         Mood and Affect: Mood normal.       Picture taken by vascular surgery      Microbiology  Microbiology Results (last 10 days)     Procedure Component Value - Date/Time    Wound Culture - Wound, Foot,  Right [[id number]] Collected: 10/02/20 1329    Lab Status: Preliminary result Specimen: Wound from Foot, Right Updated: 10/02/20 1550     Gram Stain Less than 10 WBCs seen      Rare (1+) Gram positive cocci      Rare (1+) Gram negative bacilli          Laboratory  Results from last 7 days   Lab Units 10/02/20  1401   WBC 10*3/mm3 6.80   HEMOGLOBIN g/dL 12.2*   HEMATOCRIT % 35.3*   PLATELETS 10*3/mm3 323     Results from last 7 days   Lab Units 10/02/20  1401   SODIUM mmol/L 139   POTASSIUM mmol/L 4.3   CHLORIDE mmol/L 102   CO2 mmol/L 26.0   CREATININE mg/dL 1.15   GLUCOSE mg/dL 127*   CALCIUM mg/dL 8.9     Results from last 7 days   Lab Units 10/02/20  1401   SODIUM mmol/L 139   POTASSIUM mmol/L 4.3   CHLORIDE mmol/L 102   CO2 mmol/L 26.0   CREATININE mg/dL 1.15   GLUCOSE mg/dL 127*   CALCIUM mg/dL 8.9                   Radiology  Imaging Results (Last 72 Hours)     Procedure Component Value Units Date/Time    XR Ankle 3+ View Right [[id number]] Collected: 10/02/20 1230     Updated: 10/02/20 1234    Narrative:      DATE OF EXAM:  10/2/2020 12:07 PM     PROCEDURE:  XR ANKLE 3+ VW RIGHT-     INDICATIONS:  open wound right foot, 5th metatarsal head. Assess for soft tissue  emphysema.     COMPARISON:  Right foot radiographs 11/02/2020     TECHNIQUE:   3 views of the right ankle(s) was/were obtained.     FINDINGS:  No right ankle fracture or joint dislocation is seen. No evidence of  osteomyelitis in the right ankle. No retained radiopaque foreign body.  No subcutaneous gas. Small enthesophytes at the Achilles and plantar  tendon insertions upon the posterior calcaneus.        Impression:         1. Normal right ankle. No acute osseous abnormality or evidence of  osteomyelitis of the right ankle.  2. No  subcutaneous gas is seen.     Electronically Signed By-Dr. Ekaterina Goins MD On:10/2/2020 12:32 PM  This report was finalized on [id number] by Dr. Ekaterina Goins MD.    XR Foot 3+ View Right [[id number]] Collected:  10/02/20 1231     Updated: 10/02/20 1234    Narrative:      DATE OF EXAM:  10/2/2020 12:08 PM     PROCEDURE:  XR FOOT 3+ VW RIGHT-     INDICATIONS:  open wound right foot, 5th metatarsal head     COMPARISON:  Foot radiograph 8/16/2018     TECHNIQUE:   A minimum of three routine standard radiographic views were obtained of  the right foot.     FINDINGS:  There is bandage material along the foot which limits fine osseous  detail. There is soft tissue gas focus at the head of the fifth  metatarsal. There is indistinct margins of the cortex at the lateral  aspect head of the fifth metatarsal. This is consistent with  osteomyelitis given the clinical history. Soft tissue infection is also  likely given the presence of small amount of soft tissue gas. There are  hammertoe deformities with abnormal alignment of the second third fourth  metatarsal phalange joints. There is abnormal alignment at the first  metatarsal phalange joint. There is mild diffuse soft tissue swelling.  There is osteopenia. There is mild arthritis of the tarsometatarsal  joints and talonavicular joint. There is a small plantar calcaneal spur.  There is a small enthesophyte Achilles tendon insertion site.        Impression:      Findings consistent with ostomy myelitis with osseous destruction along  lateral margin of the fifth metatarsal head. Small foci of soft tissue  gas suggest soft tissue infection at this location as well.     Electronically Signed By-Antoinette August MD On:10/2/2020 12:32 PM  This report was finalized on [id number] by  Antoinette August MD.          Cardiology      Results Review:  I have reviewed all clinical data, test, lab, and imaging results.       Schedule Meds  [START ON 10/4/2020] !Vancomycin Level Draw Needed, , Does not apply, Once  [START ON 10/3/2020] !Vancomycin Level Draw Needed, , Does not apply, Once  Pharmacy to dose vancomycin, , Does not apply, Q12H  Pharmacy to Dose Zosyn, , Does not apply,  Q8H  piperacillin-tazobactam, 4.5 g, Intravenous, Q6H  [START ON 10/3/2020] vancomycin, 1,500 mg, Intravenous, Q12H  vancomycin, 2,000 mg, Intravenous, Once        Infusion Meds       PRN Meds        Assessment/Plan       Assessment    Right diabetic foot wound  -Patient states he has had a callus type wound there for approximately a year but recently opened up about 3 weeks ago.  -Wound cultures pending  -X-ray shows osteomyelitis around the fifth metatarsal head  -ABIs are normal    Patient states that he was just recently told he had diabetes but has not started treatment    CAD stent placement, CHF, A. Fib    Pacemaker placement    History of prostate cancer    Plan    Continue IV vancomycin   Discontinue IV Zosyn  Start IV cefepime 2 g every 8 hours  Waiting on wound cultures  Waiting on podiatry input-appears that patient will likely need partial amputation of the fifth metatarsal bone  CT of right lower extremity has been ordered already  Continue supportive care  A.m. labs with sed rate, CRP      KOURTNEY Leung  10/02/20  16:09 EDT

## 2020-10-03 LAB
ANION GAP SERPL CALCULATED.3IONS-SCNC: 8 MMOL/L (ref 5–15)
ANION GAP SERPL CALCULATED.3IONS-SCNC: 9 MMOL/L (ref 5–15)
BASOPHILS # BLD AUTO: 0.1 10*3/MM3 (ref 0–0.2)
BASOPHILS # BLD AUTO: 0.1 10*3/MM3 (ref 0–0.2)
BASOPHILS NFR BLD AUTO: 1.2 % (ref 0–1.5)
BASOPHILS NFR BLD AUTO: 1.2 % (ref 0–1.5)
BUN SERPL-MCNC: 22 MG/DL (ref 8–23)
BUN SERPL-MCNC: 24 MG/DL (ref 8–23)
BUN SERPL-MCNC: 26 MG/DL (ref 8–23)
BUN SERPL-MCNC: ABNORMAL MG/DL
BUN SERPL-MCNC: ABNORMAL MG/DL
BUN/CREAT SERPL: ABNORMAL
BUN/CREAT SERPL: ABNORMAL
CALCIUM SPEC-SCNC: 8.6 MG/DL (ref 8.6–10.5)
CALCIUM SPEC-SCNC: 8.7 MG/DL (ref 8.6–10.5)
CHLORIDE SERPL-SCNC: 101 MMOL/L (ref 98–107)
CHLORIDE SERPL-SCNC: 103 MMOL/L (ref 98–107)
CO2 SERPL-SCNC: 26 MMOL/L (ref 22–29)
CO2 SERPL-SCNC: 26 MMOL/L (ref 22–29)
CREAT SERPL-MCNC: 1.2 MG/DL (ref 0.76–1.27)
CREAT SERPL-MCNC: 1.26 MG/DL (ref 0.76–1.27)
DEPRECATED RDW RBC AUTO: 46.8 FL (ref 37–54)
DEPRECATED RDW RBC AUTO: 47.7 FL (ref 37–54)
EOSINOPHIL # BLD AUTO: 0.6 10*3/MM3 (ref 0–0.4)
EOSINOPHIL # BLD AUTO: 0.6 10*3/MM3 (ref 0–0.4)
EOSINOPHIL NFR BLD AUTO: 8 % (ref 0.3–6.2)
EOSINOPHIL NFR BLD AUTO: 8 % (ref 0.3–6.2)
ERYTHROCYTE [DISTWIDTH] IN BLOOD BY AUTOMATED COUNT: 15.1 % (ref 12.3–15.4)
ERYTHROCYTE [DISTWIDTH] IN BLOOD BY AUTOMATED COUNT: 15.1 % (ref 12.3–15.4)
ERYTHROCYTE [SEDIMENTATION RATE] IN BLOOD: 35 MM/HR (ref 0–20)
GFR SERPL CREATININE-BSD FRML MDRD: 56 ML/MIN/1.73
GFR SERPL CREATININE-BSD FRML MDRD: 59 ML/MIN/1.73
GLUCOSE BLDC GLUCOMTR-MCNC: 151 MG/DL (ref 70–105)
GLUCOSE BLDC GLUCOMTR-MCNC: 189 MG/DL (ref 70–105)
GLUCOSE BLDC GLUCOMTR-MCNC: 204 MG/DL (ref 70–105)
GLUCOSE BLDC GLUCOMTR-MCNC: 217 MG/DL (ref 70–105)
GLUCOSE SERPL-MCNC: 149 MG/DL (ref 65–99)
GLUCOSE SERPL-MCNC: 163 MG/DL (ref 65–99)
HCT VFR BLD AUTO: 35.9 % (ref 37.5–51)
HCT VFR BLD AUTO: 35.9 % (ref 37.5–51)
HGB BLD-MCNC: 12.3 G/DL (ref 13–17.7)
HGB BLD-MCNC: 12.4 G/DL (ref 13–17.7)
LYMPHOCYTES # BLD AUTO: 1.3 10*3/MM3 (ref 0.7–3.1)
LYMPHOCYTES # BLD AUTO: 1.3 10*3/MM3 (ref 0.7–3.1)
LYMPHOCYTES NFR BLD AUTO: 17.3 % (ref 19.6–45.3)
LYMPHOCYTES NFR BLD AUTO: 17.5 % (ref 19.6–45.3)
MCH RBC QN AUTO: 30.7 PG (ref 26.6–33)
MCH RBC QN AUTO: 31.2 PG (ref 26.6–33)
MCHC RBC AUTO-ENTMCNC: 34.2 G/DL (ref 31.5–35.7)
MCHC RBC AUTO-ENTMCNC: 34.6 G/DL (ref 31.5–35.7)
MCV RBC AUTO: 89.7 FL (ref 79–97)
MCV RBC AUTO: 90 FL (ref 79–97)
MONOCYTES # BLD AUTO: 0.6 10*3/MM3 (ref 0.1–0.9)
MONOCYTES # BLD AUTO: 0.7 10*3/MM3 (ref 0.1–0.9)
MONOCYTES NFR BLD AUTO: 9 % (ref 5–12)
MONOCYTES NFR BLD AUTO: 9.6 % (ref 5–12)
NEUTROPHILS NFR BLD AUTO: 4.6 10*3/MM3 (ref 1.7–7)
NEUTROPHILS NFR BLD AUTO: 4.7 10*3/MM3 (ref 1.7–7)
NEUTROPHILS NFR BLD AUTO: 63.9 % (ref 42.7–76)
NEUTROPHILS NFR BLD AUTO: 64.3 % (ref 42.7–76)
NRBC BLD AUTO-RTO: 0 /100 WBC (ref 0–0.2)
NRBC BLD AUTO-RTO: 0.1 /100 WBC (ref 0–0.2)
PLATELET # BLD AUTO: 307 10*3/MM3 (ref 140–450)
PLATELET # BLD AUTO: 315 10*3/MM3 (ref 140–450)
PMV BLD AUTO: 7.7 FL (ref 6–12)
PMV BLD AUTO: 7.9 FL (ref 6–12)
POTASSIUM SERPL-SCNC: 4.2 MMOL/L (ref 3.5–5.2)
POTASSIUM SERPL-SCNC: 4.5 MMOL/L (ref 3.5–5.2)
RBC # BLD AUTO: 3.99 10*6/MM3 (ref 4.14–5.8)
RBC # BLD AUTO: 4.01 10*6/MM3 (ref 4.14–5.8)
SODIUM SERPL-SCNC: 136 MMOL/L (ref 136–145)
SODIUM SERPL-SCNC: 137 MMOL/L (ref 136–145)
VANCOMYCIN PEAK SERPL-MCNC: 27.9 MCG/ML (ref 20–40)
WBC # BLD AUTO: 7.2 10*3/MM3 (ref 3.4–10.8)
WBC # BLD AUTO: 7.3 10*3/MM3 (ref 3.4–10.8)

## 2020-10-03 PROCEDURE — 93010 ELECTROCARDIOGRAM REPORT: CPT | Performed by: INTERNAL MEDICINE

## 2020-10-03 PROCEDURE — 25010000002 CEFEPIME PER 500 MG: Performed by: FAMILY MEDICINE

## 2020-10-03 PROCEDURE — 99222 1ST HOSP IP/OBS MODERATE 55: CPT | Performed by: INTERNAL MEDICINE

## 2020-10-03 PROCEDURE — 94799 UNLISTED PULMONARY SVC/PX: CPT

## 2020-10-03 PROCEDURE — 80048 BASIC METABOLIC PNL TOTAL CA: CPT | Performed by: FAMILY MEDICINE

## 2020-10-03 PROCEDURE — 82962 GLUCOSE BLOOD TEST: CPT

## 2020-10-03 PROCEDURE — 80202 ASSAY OF VANCOMYCIN: CPT | Performed by: FAMILY MEDICINE

## 2020-10-03 PROCEDURE — 93005 ELECTROCARDIOGRAM TRACING: CPT | Performed by: NURSE PRACTITIONER

## 2020-10-03 PROCEDURE — 25010000002 ENOXAPARIN PER 10 MG: Performed by: FAMILY MEDICINE

## 2020-10-03 PROCEDURE — 85652 RBC SED RATE AUTOMATED: CPT | Performed by: FAMILY MEDICINE

## 2020-10-03 PROCEDURE — 25010000002 VANCOMYCIN 10 G RECONSTITUTED SOLUTION: Performed by: FAMILY MEDICINE

## 2020-10-03 PROCEDURE — 25010000002 HYDROMORPHONE PER 4 MG: Performed by: FAMILY MEDICINE

## 2020-10-03 PROCEDURE — 85025 COMPLETE CBC W/AUTO DIFF WBC: CPT | Performed by: FAMILY MEDICINE

## 2020-10-03 PROCEDURE — 0QBN3ZX EXCISION OF RIGHT METATARSAL, PERCUTANEOUS APPROACH, DIAGNOSTIC: ICD-10-PCS | Performed by: PODIATRIST

## 2020-10-03 RX ORDER — ASPIRIN 81 MG/1
81 TABLET ORAL DAILY
Status: DISCONTINUED | OUTPATIENT
Start: 2020-10-03 | End: 2020-10-07 | Stop reason: HOSPADM

## 2020-10-03 RX ORDER — HYDROMORPHONE HCL 110MG/55ML
1 PATIENT CONTROLLED ANALGESIA SYRINGE INTRAVENOUS EVERY 4 HOURS PRN
Status: DISCONTINUED | OUTPATIENT
Start: 2020-10-03 | End: 2020-10-07 | Stop reason: HOSPADM

## 2020-10-03 RX ADMIN — PREGABALIN 150 MG: 75 CAPSULE ORAL at 08:21

## 2020-10-03 RX ADMIN — DIGOXIN 250 MCG: 250 TABLET ORAL at 13:22

## 2020-10-03 RX ADMIN — ATORVASTATIN CALCIUM 40 MG: 40 TABLET, FILM COATED ORAL at 21:05

## 2020-10-03 RX ADMIN — HYDROMORPHONE HYDROCHLORIDE 1 MG: 2 INJECTION, SOLUTION INTRAMUSCULAR; INTRAVENOUS; SUBCUTANEOUS at 16:59

## 2020-10-03 RX ADMIN — Medication 10 ML: at 08:22

## 2020-10-03 RX ADMIN — Medication: at 21:58

## 2020-10-03 RX ADMIN — POTASSIUM CHLORIDE 10 MEQ: 750 TABLET, EXTENDED RELEASE ORAL at 08:21

## 2020-10-03 RX ADMIN — HYDROMORPHONE HYDROCHLORIDE 1 MG: 2 INJECTION, SOLUTION INTRAMUSCULAR; INTRAVENOUS; SUBCUTANEOUS at 21:04

## 2020-10-03 RX ADMIN — Medication: at 03:28

## 2020-10-03 RX ADMIN — Medication 10 ML: at 21:05

## 2020-10-03 RX ADMIN — CEFEPIME HYDROCHLORIDE 2 G: 2 INJECTION, POWDER, FOR SOLUTION INTRAVENOUS at 08:21

## 2020-10-03 RX ADMIN — HYDROMORPHONE HYDROCHLORIDE 1 MG: 2 INJECTION, SOLUTION INTRAMUSCULAR; INTRAVENOUS; SUBCUTANEOUS at 12:31

## 2020-10-03 RX ADMIN — METOPROLOL TARTRATE 12.5 MG: 25 TABLET, FILM COATED ORAL at 15:22

## 2020-10-03 RX ADMIN — PREGABALIN 150 MG: 75 CAPSULE ORAL at 21:04

## 2020-10-03 RX ADMIN — DILTIAZEM HYDROCHLORIDE 120 MG: 120 CAPSULE, COATED, EXTENDED RELEASE ORAL at 08:22

## 2020-10-03 RX ADMIN — Medication: at 15:00

## 2020-10-03 RX ADMIN — POTASSIUM CHLORIDE 10 MEQ: 750 TABLET, EXTENDED RELEASE ORAL at 21:05

## 2020-10-03 RX ADMIN — IPRATROPIUM BROMIDE AND ALBUTEROL SULFATE 3 ML: 2.5; .5 SOLUTION RESPIRATORY (INHALATION) at 15:35

## 2020-10-03 RX ADMIN — LOSARTAN POTASSIUM 100 MG: 50 TABLET, FILM COATED ORAL at 08:22

## 2020-10-03 RX ADMIN — IPRATROPIUM BROMIDE AND ALBUTEROL SULFATE 3 ML: 2.5; .5 SOLUTION RESPIRATORY (INHALATION) at 18:26

## 2020-10-03 RX ADMIN — IPRATROPIUM BROMIDE AND ALBUTEROL SULFATE 3 ML: 2.5; .5 SOLUTION RESPIRATORY (INHALATION) at 07:36

## 2020-10-03 RX ADMIN — HYDROMORPHONE HYDROCHLORIDE 1 MG: 2 INJECTION, SOLUTION INTRAMUSCULAR; INTRAVENOUS; SUBCUTANEOUS at 08:23

## 2020-10-03 RX ADMIN — ASPIRIN 81 MG: 81 TABLET, COATED ORAL at 13:56

## 2020-10-03 RX ADMIN — METOPROLOL TARTRATE 25 MG: 25 TABLET, FILM COATED ORAL at 08:21

## 2020-10-03 RX ADMIN — METOPROLOL TARTRATE 37.5 MG: 25 TABLET, FILM COATED ORAL at 21:04

## 2020-10-03 RX ADMIN — ENOXAPARIN SODIUM 40 MG: 40 INJECTION SUBCUTANEOUS at 15:23

## 2020-10-03 RX ADMIN — IPRATROPIUM BROMIDE AND ALBUTEROL SULFATE 3 ML: 2.5; .5 SOLUTION RESPIRATORY (INHALATION) at 11:45

## 2020-10-03 RX ADMIN — VANCOMYCIN HYDROCHLORIDE 1500 MG: 10 INJECTION, POWDER, LYOPHILIZED, FOR SOLUTION INTRAVENOUS at 16:59

## 2020-10-03 RX ADMIN — OXYCODONE HYDROCHLORIDE 10 MG: 5 TABLET ORAL at 02:56

## 2020-10-03 RX ADMIN — CEFEPIME HYDROCHLORIDE 2 G: 2 INJECTION, POWDER, FOR SOLUTION INTRAVENOUS at 15:22

## 2020-10-03 RX ADMIN — VANCOMYCIN HYDROCHLORIDE 1500 MG: 10 INJECTION, POWDER, LYOPHILIZED, FOR SOLUTION INTRAVENOUS at 05:44

## 2020-10-03 NOTE — PROGRESS NOTES
Infectious Diseases Progress Note      LOS: 1 day   Patient Care Team:  Yusuf Jones MD as PCP - General  PhiJoshua MD as Consulting Physician (Cardiology)  Deam, Moise Kathleen MD as Consulting Physician (Cardiac Electrophysiology)    Chief Complaint: Right foot wound    Subjective       The patient remained afebrile.  The patient remained hemodynamically stable.  He stating his current antibiotics with no side effects.  Review of Systems:   Review of Systems   Constitutional: Negative.    HENT: Negative.    Eyes: Negative.    Respiratory: Negative.    Cardiovascular: Negative.    Gastrointestinal: Negative.    Genitourinary: Negative.    Musculoskeletal: Negative.    Skin: Positive for wound.   Neurological: Negative.    Hematological: Negative.    Psychiatric/Behavioral: Negative.         Objective     Vital Signs  Temp:  [97.7 °F (36.5 °C)-98.2 °F (36.8 °C)] 97.7 °F (36.5 °C)  Heart Rate:  [69-73] 70  Resp:  [14-18] 16  BP: (150-165)/(74-93) 150/77    Physical Exam:  Physical Exam  Vitals signs and nursing note reviewed.   Constitutional:       Appearance: He is well-developed.   HENT:      Head: Normocephalic and atraumatic.   Eyes:      Pupils: Pupils are equal, round, and reactive to light.   Neck:      Musculoskeletal: Normal range of motion and neck supple.   Cardiovascular:      Rate and Rhythm: Normal rate and regular rhythm.      Heart sounds: Normal heart sounds.   Pulmonary:      Effort: Pulmonary effort is normal. No respiratory distress.      Breath sounds: Normal breath sounds. No wheezing or rales.   Abdominal:      General: Bowel sounds are normal. There is no distension.      Palpations: Abdomen is soft. There is no mass.      Tenderness: There is no abdominal tenderness. There is no guarding or rebound.   Musculoskeletal: Normal range of motion.         General: No deformity.      Comments: Wound present on the lateral aspect of the right foot at the fifth MTP joint site    Skin:     General: Skin is warm.      Findings: No erythema or rash.   Neurological:      Mental Status: He is alert and oriented to person, place, and time.      Cranial Nerves: No cranial nerve deficit.          Results Review:    I have reviewed all clinical data, test, lab, and imaging results.     Radiology  Xr Chest 1 View    Result Date: 10/2/2020  DATE OF EXAM: 10/2/2020 10:35 PM  PROCEDURE: XR CHEST 1 VW-  INDICATIONS: SOB  COMPARISON: Chest radiograph 12/8/2019 and 12/3/2019. CT abdomen pelvis 08/04/2020.  TECHNIQUE: Single radiographic AP view of the chest was obtained.  FINDINGS: The study is mildly limited by lordotic patient positioning. Overlying artifacts. Stable left chest wall cardiac pacer device. The lungs are clear of consolidation. No pneumothorax. Unchanged cardiomediastinal contours. Partially visualized thoracic spondylosis and degenerative changes in both shoulders. Multiple old right rib fracture deformities. No acute osseous abnormality is identified.      Mildly limited study demonstrating no active disease.  Electronically Signed By-Shahriar Garcia On:10/2/2020 11:07 PM This report was finalized on [id number] by  Shahriar Garcia, .      Cardiology    Laboratory    Results from last 7 days   Lab Units 10/03/20  0247 10/02/20  2355 10/02/20  1401   WBC 10*3/mm3 7.20 7.30 6.80   HEMOGLOBIN g/dL 12.3* 12.4* 12.2*   HEMATOCRIT % 35.9* 35.9* 35.3*   PLATELETS 10*3/mm3 315 307 323     Results from last 7 days   Lab Units 10/03/20  0247 10/02/20  2355 10/02/20  1401   SODIUM mmol/L 137 136 139   POTASSIUM mmol/L 4.5 4.2 4.3   CHLORIDE mmol/L 103 101 102   CO2 mmol/L 26.0 26.0 26.0   BUN  22 24* 26*   CREATININE mg/dL 1.26 1.20 1.15   GLUCOSE mg/dL 149* 163* 127*   CALCIUM mg/dL 8.6 8.7 8.9         Results from last 7 days   Lab Units 10/03/20  0247   SED RATE mm/hr 35*         Microbiology   Microbiology Results (last 10 days)     Procedure Component Value - Date/Time    Wound Culture - Wound,  Foot, Right [[id number]] Collected: 10/02/20 1329    Lab Status: Preliminary result Specimen: Wound from Foot, Right Updated: 10/02/20 8377     Gram Stain Less than 10 WBCs seen      Rare (1+) Gram positive cocci      Rare (1+) Gram negative bacilli          Medication Review:       Schedule Meds  [START ON 10/4/2020] !Vancomycin Level Draw Needed, , Does not apply, Once  !Vancomycin Level Draw Needed, , Does not apply, Once  aspirin, 81 mg, Oral, Daily  atorvastatin, 40 mg, Oral, Nightly  cefepime, 2 g, Intravenous, Q8H  digoxin, 250 mcg, Oral, Daily  dilTIAZem CD, 120 mg, Oral, Daily  enoxaparin, 40 mg, Subcutaneous, Q24H  ipratropium-albuterol, 3 mL, Nebulization, 4x Daily - RT  losartan, 100 mg, Oral, Daily  metoprolol tartrate, 37.5 mg, Oral, Q12H  Pharmacy to dose vancomycin, , Does not apply, Q12H  potassium chloride, 10 mEq, Oral, BID  pregabalin, 150 mg, Oral, BID  sodium chloride, 10 mL, Intravenous, Q12H  vancomycin, 1,500 mg, Intravenous, Q12H        Infusion Meds       PRN Meds  HYDROmorphone  •  oxyCODONE        Assessment/Plan       Antimicrobial Therapy   1.  IV cefepime      day  2.  IV vancomycin      day  3.      Day  4.      Day  5.      Day        Assessment    Right diabetic foot wound.  Initial plain x-ray was suspicious for osteomyelitis however CT scan was negative.  Unable to obtain MRI since patient had a pacemaker placement in the past  Wound culture is pending    Patient states that he was just recently told he had diabetes but has not started treatment    CAD stent placement, CHF, A. Fib    Pacemaker placement    History of prostate cancer    Plan    Continue IV vancomycin   Continue IV cefepime 2 g every 8 hours  Waiting on wound cultures  Request three-phase bone scan of the right foot  A.m. labs        Francis Morton MD  10/03/20  18:00 EDT      Note is dictated utilizing voice recognition software/Dragon

## 2020-10-03 NOTE — PLAN OF CARE
Patient had debridement of wound to right 5th toe today. Treatment complete. Patient being seen by Infectious disease and more tests ordered. Continue to monitor.   Problem: Adult Inpatient Plan of Care  Goal: Plan of Care Review  Outcome: Ongoing, Progressing  Goal: Patient-Specific Goal (Individualized)  Outcome: Ongoing, Progressing  Goal: Absence of Hospital-Acquired Illness or Injury  Outcome: Ongoing, Progressing  Intervention: Identify and Manage Fall Risk  Recent Flowsheet Documentation  Taken 10/3/2020 1106 by Yelena Almonte RN  Safety Promotion/Fall Prevention:   assistive device/personal items within reach   clutter free environment maintained   fall prevention program maintained   nonskid shoes/slippers when out of bed   room organization consistent   safety round/check completed  Taken 10/3/2020 0710 by Yelena Almonte RN  Safety Promotion/Fall Prevention:   safety round/check completed   assistive device/personal items within reach   clutter free environment maintained   fall prevention program maintained   nonskid shoes/slippers when out of bed   room organization consistent  Intervention: Prevent Infection  Recent Flowsheet Documentation  Taken 10/3/2020 1106 by Yelena Almonte RN  Infection Prevention:   environmental surveillance performed   equipment surfaces disinfected   hand hygiene promoted   personal protective equipment utilized   rest/sleep promoted   single patient room provided   visitors restricted/screened  Taken 10/3/2020 0710 by Yelena Almonte RN  Infection Prevention:   personal protective equipment utilized   environmental surveillance performed   equipment surfaces disinfected   hand hygiene promoted   rest/sleep promoted   single patient room provided   visitors restricted/screened  Goal: Optimal Comfort and Wellbeing  Outcome: Ongoing, Progressing  Intervention: Provide Person-Centered Care  Recent Flowsheet Documentation  Taken 10/3/2020 0710 by Yelena Almonte  RN  Trust Relationship/Rapport: care explained  Goal: Readiness for Transition of Care  Outcome: Ongoing, Progressing   Goal Outcome Evaluation:  Plan of Care Reviewed With: patient

## 2020-10-03 NOTE — PROCEDURES
Procedure Note   Foot and Ankle Surgery   Provider: Dr. Josef Egan DPM  Location: [facility]    Surgeon: Dr. Josef Egan DPM    Assistant: None    Anesthesia: None    Implants: None    Findings: Wound to level of capsule    Specimen: Cx pending    Blood Loss: Less than 5cc    Complications: None    Post Op Plan: Continue daily betadine WTD dressing changes. Plan for OR on Monday for more thorough I&D with bone biopsy.    Summary:    The patient was seen on the floor. Procedure, risks, complications, and goals were discussed with the patient at bedside.  Risks include but are not limited to continued infection, chronic pain or numbness, hematoma/seroma, deep vein thrombosis, and potential for additional surgical procedures.  Patient understands and elects to proceed with bedside I&D at this time. All questions were answered to the patient's satisfaction. No guarantees or assurances were given or implied.    His right foot was confirmed to be correct. The RN was at bedside and confirmed this as well. His foot was prepped with betadine in the usual aseptic fashion. A #15 blade was utilized to perform and excisional debridement of subcutaneous tissue to the level of capsule. His wound base was well-adhered circumferentially with a small opening centrally that probed to capsule. There was no evidence of purulence, malodor, or other acute signs of infection. Pre-debridement wound measurements were 1.5 x 2.0 x 0.3 cm to the level of capsule. Post-debridement wound measurements were 1.5 x 2.0 x 0.4 cm to the level of capsule. The wound site was flushed with copious amounts of normal saline. He did have good bleeding during the procedure.The patient tolerated the procedure well. Hemostasis was achieved with compression. The patient was then placed in a betadine wet to dry dressing with a light compression wrap. He is to remain strict NWB to his right lower extremity. Continue betadine wet to dry  dressing changes.      Dr. Josef Egan, DPM  [facility], [employer]  [phone]    Note is dictated utilizing voice recognition software. Unfortunately this leads to occasional typographical errors. I apologize in advance if the situation occurs. If questions occur please do not hesitate to call our office.

## 2020-10-03 NOTE — PLAN OF CARE
Goal Outcome Evaluation:  Plan of Care Reviewed With: patient     Outcome Summary: Patient in bed awake off and on throughout the night.  PRN pain med given and effective.  Pleasant and cooperative.  Continues on IV ABT's. Will continue to monitor.

## 2020-10-03 NOTE — CONSULTS
CARDIOLOGY CONSULT NOTE      Referring Provider: Dr. Jones    Reason for Consultation:    Pre Op Evaluation  Atrial fibrillation chronic  Hypertension  CAD  Status post LAD stenting    Attending: Yusuf Jones MD    Chief complaint    Right foot wound    Subjective .     History of present illness:  Ro Nathan is a 75 y.o. male who presents with patient has a wound on his right foot and on x-ray has been noted to have osteomyelitis.  Patient reports he has had the wound for the last year but over the last 3 weeks it has opened and started to drain.    Patient has a known history of coronary artery disease with previous PCI with drug-eluting stent to the LAD in December 2019.  He has residual borderline disease of the left circumflex is being treated medically.  He also has a history of  atrial fibrillation, hypertension, dyslipidemia, COPD, CKD, GERD,DM, Pacemaker    Recently admitted to [facility] in August 2020 with complaints of abdominal pain, anorexia and found to have a small bowel obstruction.    Patient has been on Effient.  This was discontinued on admission.    She denies any chest pain, dyspnea, PND, orthopnea, palpitations, near syncope, lower extremity edema or feelings of his heart racing.  He reports compliance with medical therapy    Review of Systems   Constitution: Negative for decreased appetite and diaphoresis.   HENT: Negative for congestion, hearing loss and nosebleeds.    Cardiovascular: Negative for chest pain, claudication, dyspnea on exertion, irregular heartbeat, leg swelling, near-syncope, orthopnea, palpitations, paroxysmal nocturnal dyspnea and syncope.   Respiratory: Positive for shortness of breath. Negative for cough and sleep disturbances due to breathing.    Endocrine: Negative for polyuria.   Hematologic/Lymphatic: Does not bruise/bleed easily.   Skin: Positive for poor wound healing. Negative for itching and rash.   Musculoskeletal: Positive for back pain and  joint pain. Negative for muscle weakness and myalgias.   Gastrointestinal: Negative for abdominal pain, change in bowel habit and nausea.   Genitourinary: Negative for dysuria, flank pain, frequency and hesitancy.   Neurological: Positive for weakness. Negative for dizziness and tremors.   Psychiatric/Behavioral: Negative for altered mental status. The patient does not have insomnia.        History  Past Medical History:   Diagnosis Date   • Bruises easily    • CHF (congestive heart failure) (CMS/[facility])    • Congenital heart defect    • Difficulty attaining erection    • Hypertension    • Low back pain    • Poor circulation    • Prostate cancer (CMS/[facility])    • Shortness of breath    • Stented coronary artery 2019    MICHAEL to LAD       Past Surgical History:   Procedure Laterality Date   • APPENDECTOMY     • CARDIAC CATHETERIZATION N/A 2019    Procedure: Left Heart Cath;  Surgeon: Mirza Munson MD;  Location: [facility] CATH INVASIVE LOCATION;  Service: Cardiology   • CARDIAC CATHETERIZATION N/A 2019    Procedure: Stent MICHAEL coronary;  Surgeon: Mirza Munson MD;  Location: [facility] CATH INVASIVE LOCATION;  Service: Cardiology   • CHOLECYSTECTOMY     • ELBOW PROCEDURE      left   • HERNIA REPAIR     • INTERVENTIONAL RADIOLOGY PROCEDURE N/A 2019    Procedure: Intravascular Ultrasound;  Surgeon: Mirza Munson MD;  Location: [facility] CATH INVASIVE LOCATION;  Service: Cardiology   • NJ RT/LT HEART CATHETERS N/A 2019    Procedure: Percutaneous Coronary Intervention;  Surgeon: Mirza Munson MD;  Location: [facility] CATH INVASIVE LOCATION;  Service: Cardiology       Family History   Problem Relation Age of Onset   • Alzheimer's disease Mother    • GI problems Father    • Heart disease Father        Social History     Tobacco Use   • Smoking status: Former Smoker     Years: 15.00     Start date: 1966     Quit date: 5/10/2020     Years since quittin.4   •  Smokeless tobacco: Never Used   Substance Use Topics   • Alcohol use: Not Currently     Frequency: Never   • Drug use: No        Medications Prior to Admission   Medication Sig Dispense Refill Last Dose   • aspirin 81 MG EC tablet Take 1 tablet by mouth Daily. 30 tablet 1 10/2/2020 at Unknown time   • atorvastatin (LIPITOR) 40 MG tablet Take 1 tablet by mouth Every Night. 30 tablet 1 10/1/2020 at Unknown time   • digoxin (LANOXIN) 250 MCG tablet Take 250 mcg by mouth Daily.   10/2/2020 at Unknown time   • diltiaZEM CD (CARDIZEM CD) 120 MG 24 hr capsule Take 120 mg by mouth Daily.   10/2/2020 at Unknown time   • furosemide (LASIX) 40 MG tablet Take 40 mg by mouth Daily.   10/2/2020 at Unknown time   • losartan (COZAAR) 100 MG tablet Take 100 mg by mouth Daily.   10/2/2020 at Unknown time   • LYRICA 150 MG capsule Take 150 mg by mouth 2 (Two) Times a Day.   10/2/2020 at Unknown time   • metoprolol tartrate (LOPRESSOR) 25 MG tablet Take 1 tablet by mouth Every 12 (Twelve) Hours. (Patient taking differently: Take 25 mg by mouth Every 12 (Twelve) Hours. Verified with [facility] pharmacy) 60 tablet 1 10/2/2020 at Unknown time   • mirtazapine (REMERON) 30 MG tablet Take 30 mg by mouth Every Night.   10/1/2020 at Unknown time   • oxyCODONE-acetaminophen (Percocet)  MG per tablet Take 1 tablet by mouth Every 6 (Six) Hours As Needed for Moderate Pain . 120 tablet 0 10/2/2020 at Unknown time   • potassium chloride (K-DUR) 10 MEQ CR tablet Take 10 mEq by mouth 2 (Two) Times a Day.   10/2/2020 at Unknown time   • prasugrel (EFFIENT) 10 MG tablet Take 10 mg by mouth Daily.   10/2/2020 at Unknown time   • traZODone (DESYREL) 100 MG tablet Take 100 mg by mouth Every Night.   10/1/2020 at Unknown time   • VENTOLIN  (90 Base) MCG/ACT inhaler Inhale 2 puffs Every 6 (Six) Hours As Needed for Wheezing or Shortness of Air.   10/1/2020 at Unknown time         Haloperidol, Morphine, and Pantoprazole    Scheduled Meds:[START ON  10/4/2020] !Vancomycin Level Draw Needed, , Does not apply, Once  !Vancomycin Level Draw Needed, , Does not apply, Once  aspirin, 81 mg, Oral, Daily  atorvastatin, 40 mg, Oral, Nightly  cefepime, 2 g, Intravenous, Q8H  digoxin, 250 mcg, Oral, Daily  dilTIAZem CD, 120 mg, Oral, Daily  enoxaparin, 40 mg, Subcutaneous, Q24H  ipratropium-albuterol, 3 mL, Nebulization, 4x Daily - RT  losartan, 100 mg, Oral, Daily  metoprolol tartrate, 37.5 mg, Oral, Q12H  Pharmacy to dose vancomycin, , Does not apply, Q12H  potassium chloride, 10 mEq, Oral, BID  pregabalin, 150 mg, Oral, BID  sodium chloride, 10 mL, Intravenous, Q12H  vancomycin, 1,500 mg, Intravenous, Q12H      Continuous Infusions:   PRN Meds:.HYDROmorphone  •  oxyCODONE    Objective     VITAL SIGNS  Vitals:    10/03/20 0736 10/03/20 0821 10/03/20 1145 10/03/20 1226   BP:  150/77  165/86   BP Location:    Right arm   Patient Position:    Lying   Pulse: 71 69 70 71   Resp: 16  14 15   Temp:    97.7 °F (36.5 °C)   TempSrc:    Oral   SpO2: 95%  96% 96%   Weight:           Flowsheet Rows      First Filed Value   Admission Height  --   Admission Weight  119 kg (262 lb) Documented at 10/02/2020 1500           TELEMETRY: V paced    Physical Exam:  Constitutional:       General: Not in acute distress.     Appearance: Normal appearance. Well-developed.   Eyes:      Pupils: Pupils are equal, round, and reactive to light.   HENT:      Head: Normocephalic and atraumatic.   Neck:      Musculoskeletal: Normal range of motion and neck supple.      Vascular: No JVD.   Pulmonary:      Effort: Pulmonary effort is normal.      Breath sounds: Normal breath sounds.   Cardiovascular:      Normal rate. Regular rhythm.   Edema:     Peripheral edema absent.   Abdominal:      General: There is no distension.      Palpations: Abdomen is soft.      Tenderness: There is no abdominal tenderness.   Musculoskeletal: Normal range of motion.   Skin:     General: Skin is warm and dry.   Neurological:       Mental Status: Alert and oriented to person, place, and time.          Results Review:   I reviewed the patient's new clinical results.    CBC    Results from last 7 days   Lab Units 10/03/20  0247 10/02/20  2355 10/02/20  1401   WBC 10*3/mm3 7.20 7.30 6.80   HEMOGLOBIN g/dL 12.3* 12.4* 12.2*   PLATELETS 10*3/mm3 315 307 323     BMP   Results from last 7 days   Lab Units 10/03/20  0247 10/02/20  2355 10/02/20  1401   SODIUM mmol/L 137 136 139   POTASSIUM mmol/L 4.5 4.2 4.3   CHLORIDE mmol/L 103 101 102   CO2 mmol/L 26.0 26.0 26.0   BUN  22 24* 26*   CREATININE mg/dL 1.26 1.20 1.15   GLUCOSE mg/dL 149* 163* 127*     Cr Clearance Estimated Creatinine Clearance: 66.8 mL/min (by C-G formula based on SCr of 1.26 mg/dL).  Coag     HbA1C   Lab Results   Component Value Date    HGBA1C 6.6 (H) 10/02/2020    HGBA1C 5.6 08/04/2020     Blood Glucose   Glucose   Date/Time Value Ref Range Status   10/03/2020 1236 204 (H) 70 - 105 mg/dL Final     Comment:     Serial Number: [id number]:  [id number]   10/03/2020 0720 151 (H) 70 - 105 mg/dL Final     Comment:     Serial Number: [id number]:  [id number]   10/02/2020 1934 177 (H) 70 - 105 mg/dL Final     Comment:     Serial Number: [id number]:  [id number]   10/02/2020 1826 196 (H) 70 - 105 mg/dL Final     Comment:     Serial Number: [id number]:  [id number]   10/02/2020 1252 135 (H) 70 - 105 mg/dL Final     Comment:     Serial Number: [id number]:  [id number]     Infection     CMP   Results from last 7 days   Lab Units 10/03/20  0247 10/02/20  2355 10/02/20  1401   SODIUM mmol/L 137 136 139   POTASSIUM mmol/L 4.5 4.2 4.3   CHLORIDE mmol/L 103 101 102   CO2 mmol/L 26.0 26.0 26.0   BUN  22 24* 26*   CREATININE mg/dL 1.26 1.20 1.15   GLUCOSE mg/dL 149* 163* 127*     ABG      UA      ARTIE  No results found for: POCMETH, POCAMPHET, POCBARBITUR, POCBENZO, POCCOCAINE, POCOPIATES, POCOXYCODO, POCPHENCYC, POCPROPOXY, POCTHC, POCTRICYC  Lysis Labs   Results from last  7 days   Lab Units 10/03/20  0247 10/02/20  2355 10/02/20  1401   HEMOGLOBIN g/dL 12.3* 12.4* 12.2*   PLATELETS 10*3/mm3 315 307 323   CREATININE mg/dL 1.26 1.20 1.15     Radiology(recent) Xr Ankle 3+ View Right    Result Date: 10/2/2020   1. Normal right ankle. No acute osseous abnormality or evidence of osteomyelitis of the right ankle. 2. No  subcutaneous gas is seen.  Electronically Signed By-Dr. Ekaterina Goins MD On:10/2/2020 12:32 PM This report was finalized on [id number] by Dr. Ekaterina Goins MD.    Xr Foot 3+ View Right    Result Date: 10/2/2020  Findings consistent with ostomy myelitis with osseous destruction along lateral margin of the fifth metatarsal head. Small foci of soft tissue gas suggest soft tissue infection at this location as well.  Electronically Signed By-Antoinette August MD On:10/2/2020 12:32 PM This report was finalized on [id number] by  Antoinette August MD.    Xr Chest 1 View    Result Date: 10/2/2020  Mildly limited study demonstrating no active disease.  Electronically Signed By-Shahriar Garcia On:10/2/2020 11:07 PM This report was finalized on [id number] by  Shahriar Garcia, .    Ct Lower Extremity Right Without Contrast    Result Date: 10/2/2020   1. Partially visualized wound lateral to the fifth metatarsal head with underlying soft tissue thickening and diffuse soft tissue edema. No loculated fluid collection is seen to suggest abscess. 2. Motion degraded study demonstrating no CT evidence of osteomyelitis. 3. Moderate to advanced multifocal arthropathic changes at the midfoot, likely developing Charcot arthropathy. 4. Thickening of the distal peroneus longus tendon, statistically tendinopathy, with a chronic ossification lateral to the peroneal tubercle. 5. Mild Achilles tendinopathy and chronic plantar fasciitis.  Electronically Signed By-Shahriar Garcia On:10/2/2020 9:19 PM This report was finalized on [id number] by  Shahriar Garcia, .            Imaging Results (Last 24 Hours)      Procedure Component Value Units Date/Time    XR Chest 1 View [[id number]] Collected: 10/02/20 2306     Updated: 10/02/20 2310    Narrative:      DATE OF EXAM:  10/2/2020 10:35 PM     PROCEDURE:  XR CHEST 1 VW-     INDICATIONS:  SOB     COMPARISON:  Chest radiograph 12/8/2019 and 12/3/2019. CT abdomen pelvis 08/04/2020.     TECHNIQUE:   Single radiographic AP view of the chest was obtained.     FINDINGS:  The study is mildly limited by lordotic patient positioning. Overlying  artifacts. Stable left chest wall cardiac pacer device. The lungs are  clear of consolidation. No pneumothorax. Unchanged cardiomediastinal  contours. Partially visualized thoracic spondylosis and degenerative  changes in both shoulders. Multiple old right rib fracture deformities.  No acute osseous abnormality is identified.        Impression:      Mildly limited study demonstrating no active disease.     Electronically Signed By-Shahriar Garcia On:10/2/2020 11:07 PM  This report was finalized on [id number] by  Shahriar Garcia, .    CT Lower Extremity Right Without Contrast [[id number]] Collected: 10/02/20 2105     Updated: 10/02/20 2122    Narrative:         DATE OF EXAM:   10/2/2020 5:24 PM     PROCEDURE:   CT LOWER EXTREMITY RIGHT WO CONTRAST-     INDICATIONS:   Foot swelling, diabetic, osteomyelitis suspected, no prior imaging     COMPARISON:  Radiographs 10-20 20. CT abdomen pelvis 08/04/2020.     TECHNIQUE:   CT of the right ankle and foot was obtained without the administration  of contrast. Coronal and sagittal reformats were obtained. Automated  exposure control and alternative reconstruction methods were used.     FINDINGS:   Motion artifact mildly limits evaluation.     No evidence of acute fracture or dislocation. Partially visualized wound  lateral to the fifth metatarsal head with underlying soft tissue  thickening. No underlying loculated fluid collection to suggest abscess.  Mild diffuse soft tissue edema. No lytic or sclerotic  changes are seen  to suggest osteomyelitis.     Well-corticated 6 mm ossification along the anterior distal lateral  malleolus, likely sequela of remote trauma. There is also a 12 mm  ossification lateral to the peroneal tubercle could reflect sequela of  remote trauma or developmental variant.     Small posterior and plantar calcaneal enthesophytes. Mild thickening of  the Achilles tendon. Focal calcification of the thickened proximal  plantar fascia.     Moderate to advanced multifocal arthropathic changes at the midfoot with  likely degenerative cystic changes in the navicular, middle and lateral  cuneiforms, the cuboid, and the bases of the third, fourth, and fifth  metatarsals. Mild DJD at the great toe MTP joint.     Mild thickening of the distal peroneus longus tendon, suggesting  tendinopathy. No cystic or solid soft tissue mass. The sinus tarsi is  unremarkable. The intermetatarsal spaces are unremarkable.        Impression:         1. Partially visualized wound lateral to the fifth metatarsal head with  underlying soft tissue thickening and diffuse soft tissue edema. No  loculated fluid collection is seen to suggest abscess.  2. Motion degraded study demonstrating no CT evidence of osteomyelitis.  3. Moderate to advanced multifocal arthropathic changes at the midfoot,  likely developing Charcot arthropathy.  4. Thickening of the distal peroneus longus tendon, statistically  tendinopathy, with a chronic ossification lateral to the peroneal  tubercle.  5. Mild Achilles tendinopathy and chronic plantar fasciitis.     Electronically Signed By-Shahriar Garica On:10/2/2020 9:19 PM  This report was finalized on [id number] by  Shahriar Garcia, .          EKG      I personally viewed and interpreted the patient's EKG/Telemetry data:    ECHOCARDIOGRAM:  12/2019  Interpretation Summary       · Mild mitral valve regurgitation is present  · Mild dilation of the sinuses of Valsalva is present.  · Left ventricular systolic  function is hyperdynamic (EF > 70).  · Left atrial cavity size is severely dilated.  · Right ventricular cavity is mildly dilated.  · Left ventricular diastolic dysfunction (grade II) consistent with pseudonormalization.     Overall normal LV systolic function and size  Normal RV function, mild RV enlargement  Severe left atrial enlargement  Diastolic dysfunction indeterminate by criteria  No masses  No effusion seen  EF hyperdynamic greater than 70%  No significant valvulopathy seen            STRESS MYOVIEW:    CARDIAC CATHETERIZATION:  Indications    Unstable angina (CMS/HCC) [I20.0 (ICD-10-CM)]   Status post coronary artery stent placement [Z95.5 (ICD-10-CM)]       Conclusion    Indication for procedure:     1.  Unstable angina  2.  Known coronary artery disease status post stenting to the LAD circumflex and right coronary artery in the past   3. multiple coronary artery risk factors     Procedures performed:     1.  Native coronary arteriography  2.  Left Heart catheterization  3.  Left ventriculography  4.  Intravascular ultrasound guided PCI to the LAD  5.  Percutaneous coronary intervention involving drug-eluting stent placement to the ostial proximal LAD (3.5 x 33 mm Xience drug-eluting stent)     Description of procedure:     Patient had the risks and benefits of the procedure explained to them in detail after which informed consent was obtained.  Patient was then brought to the cardiac catheterization lab and placed on the table in supine position.  Next the right groin was prepped and draped in sterile fashion.  Next using modified Seldinger technique and a 21-gauge micro puncture needle, the femoral artery was cannulated without difficulty and a 6 Bahamian was inserted and flushed with heparinized saline.  Next using diagnostic 6 Bahamian JR4 and JL4 catheters, each advanced over an 0.035 guidewire to the level the aortic root and then used to selectively engage the respective coronary ostia, multiple  cineangiographic images were obtained all the appropriate projections using hand-injection of nonionic contrast sterile.  Before removal of the JR4 catheter the JR4 catheter used across the aortic valve in the left ventricle for left heart catheterization left ventriculography.  The catheter was then pulled back and removed.     The culprit was identified is an 80% focal in-stent restenotic lesion in the proximal LAD despite an additional severe lesion in the second obtuse marginal branch of the circumflex artery.  Therefore we decided to focus on the LAD lesion.  Using a 6 Australian EBU 4 guide catheter and a whisper export wire we wired the LAD with little difficulty.  We then performed intravascular ultrasound guided evaluation of the ostial left main and LAD lesion and also to determine reference vessel size diameter.  Following intravascular ultrasound we proceeded with predilatation of the LAD using a 3.5 x 10 mm Crosby cutting balloon in the mid proximal and proximal in-stent restenotic lesion with inflation to a maximum of 18 xin within the stented segment.  The lesion was reduced to 30%.  Stenting was then performed of the angioplastied segment using a 3.5 x 33 mm Xience drug-eluting stent deployed at 14 xin.  Postdilatation was performed along the vessel with a 3.5 mm NC trek balloon given the reference vessel diameter of 3.5 mm to 2 greater than 3.5 mm with 18 xin of pressure increasing to 20 xin of pressure in the ostial proximal portion of the stented segment.  Post stenting intravascular ultrasound revealed no edge dissection with excellent stent apposition and 0% residual stenosis.  We achieved THOR-3 flow prior and THOR-3 flow following PCI.  The guide catheter and wire were then removed     Patient tolerated the procedure well there were no complications.     During the case, conscious sedation monitoring was more than 30 min.     At the end of the case a 6 Australian Gaby device was deployed in the  right groin for right groin hemostasis     Findings     Left heart catheterization:     1.  The opening aortic pressure was 181/81 mmHg with a mean pressure of 119 mmHg  2.  The left ventricular end-diastolic pressure at end expiration was 4 to 8 mmHg  3.  There is no gradient across aortic valve on pullback     Left ventriculography     The overall estimated left ventricular ejection fraction was 65%.     Native coronary arteriography: Left dominant circulation     1.  Left main coronary arteries a large-caliber vessel gives rise left anterior descending left circumflex flex arteries.  There is an ostial eccentric 40% lesion that appears angiographically to approach 50%. No significant coronary atherosclerotic disease present.  2.  Left anterior descending artery is a large-caliber vessel that gives rise to large caliber diagonal branches and courses along the anterior interventricular groove and wraps around the apex.  There is a proximal eccentric 50% lesion within an in-stent restenotic segment followed by an greater than 80% focal eccentric in-stent restenotic lesion followed by a 60% lesion after the stented segment and otherwise no coronary atherosclerotic disease of any significance present.  3.  The left circumflex arteries a large-caliber vessel gives rise to large caliber bifurcating obtuse marginal branch.  There is an ostial proximal concentric 50% lesion that is calcified that immediately gives rise to a very high first obtuse marginal branch that is best classified as a ramus intermedius branch supplying large territory in the anterolateral wall that has an ostial proximal 70% lesion.  The calcified continuing circumflex and the posterior AV groove gives a second obtuse marginal branch that has mild diffuse disease, a third obtuse marginal branch and then a bifurcating system that has a bifurcation stenting within it before giving rise to the left PDA.  There is diffuse 50% in-stent restenosis within  this bifurcation stented segment into the obtuse marginal branch which itself has diffuse 50 to 60% disease within the stented segment.  There is a concentric 80% lesion in the circumflex PDA.  No significant coronary atherosclerotic disease present  4.  The right coronary arteries a large-caliber nondominant vessel has a stented mid segment after which there several acute marginal branches there is 50% in-stent restenosis approaching 60% at some portions within this nondominant right proximal to mid proximal segment.     Conclusions:     1.  Normal left heart filling pressures with preserved LV systolic function  2.  Normal epicardial anatomy with severe two-vessel coronary artery disease involving what is likely the culprit for the patient's chest pain syndrome, namely the very severe proximal LAD lesions.  We will treat the remaining circumflex lesions with medical therapy and see if the patient's symptoms resolved; this includes the severe lesion within the proximal portion of the high obtuse marginal branch within the circumflex system as well.     Recommendations:     Optimize medical therapy for secondary prevention of ischemic heart disease.                OTHER:         Assessment/Plan       Osteomyelitis (CMS/[facility])      Assessment:    Pre-operative Cardiac Evaluation  - last cath 12/2019   - 2D echo 12/2019 showed EF > 70%, grade 2 diastolic dysfunction, mild MR.  - EKG is V-paced / non-diagnostic; underlying afib    R foot wound/Osteomyelitis    CAD s/p PCI with MICHAEL to LAD 12/2019   - residual borderline disease of the LCx being treated medically.  - on dual antiplatelet therapy with aspirin and effient, BB, high dose statin at home    Afib / PPM  - rate controlled with BB, CCB, and PO dig at home  - not on anticoagulation given hx GIB    COPD     stage III CKD      HTN    HLD      Plan:    Noted for bedside debridement today  Would recommend resuming ASA/Effient as soon as possible  Pt has not been  anticoagulated for Afib due to reported hx of GI bleed  Routinely follows with Dr. Yip    I discussed the patients findings and my recommendations with patient and RN    Patient is seen and examined and findings are verified.  Patient is admitted with right foot nonhealing ulcer.  Patient is being considered for possible amputation of metatarsal.  Preop clearance is requested.    Patient denies any chest pain or tightness or heaviness.    Hemodynamics overall are stable but blood pressure is slightly elevated.    Normal S1 and S2.  Heart rate is regular because of the paced rhythm.  No leg edema noted.    Patient needs preop clearance.  Patient is clinically not having any congestive heart failure or angina pectoris.  At this stage, patient is cleared for the surgery.  Patient has underlying permanent atrial fibrillation with ventricular pacing.  I would increase Lopressor to 37.5 mg twice daily.  Patient is not on long-term anticoagulation.  If he is not candidate for anticoagulation, patient should be considered for watchman device.  We shall follow    Tho Gan MD  10/03/20  13:52 EDT

## 2020-10-03 NOTE — H&P
History and Physical      Patient Care Team:  Yusuf Jones MD as PCP - General  PhiJoshua MD as Consulting Physician (Cardiology)  Deam, Moise Kathleen MD as Consulting Physician (Cardiac Electrophysiology)    Chief complaint worsening right foot wound    Subjective     Patient is a 75 y.o. male presents with 3 weeks of a worsening right foot wound.  Patient was seen by primary care earlier in the week prior to admission and was started on antibiotics.  He was referred to our in-house podiatrist Dr. Rich.  The wound was not getting better so the patient was subsequently direct admitted for continued evaluation and management.  When I see him today, he is already been evaluated by infectious disease and is now on Vancocin and cefepime.  Patient is to be seen by podiatry today for possible either bedside or in the OR debridement of the wound.  CAT scan so far is indeterminate in terms of if patient actually has osteomyelitis.  Patient cannot have an MRI due to pacemaker placement.  Overall patient's foot wound is pain controlled.  He was evaluated by vascular surgery and felt that he did not need bypass as he had adequate arterial circulation to the area.  Patient's main complaint today is of chronic back pain which usually worsens every time he is in the hospital because of the beds.  He usually needs Dilaudid as needed for this and can tolerate it.  His usual outpatient opioids do not really address his back pain while he is in the hospital.  Review of Systems   All systems were reviewed and negative except for: Right foot wound nonhealing.  Chronic low back pain worsening due to mechanical problems from hospital beds.  Denies any shortness of breath or chest pain.  No abdominal pain.    History  Past Medical History:   Diagnosis Date   • Bruises easily    • CHF (congestive heart failure) (CMS/[facility])    • Congenital heart defect    • Difficulty attaining erection    • Hypertension    •  Low back pain    • Poor circulation    • Prostate cancer (CMS/HCC)    • Shortness of breath    • Stented coronary artery 2019    MICHAEL to LAD     Past Surgical History:   Procedure Laterality Date   • APPENDECTOMY     • CARDIAC CATHETERIZATION N/A 2019    Procedure: Left Heart Cath;  Surgeon: Mirza Munson MD;  Location: [facility] CATH INVASIVE LOCATION;  Service: Cardiology   • CARDIAC CATHETERIZATION N/A 2019    Procedure: Stent MICHAEL coronary;  Surgeon: Mirza Munson MD;  Location: [facility] CATH INVASIVE LOCATION;  Service: Cardiology   • CHOLECYSTECTOMY     • ELBOW PROCEDURE      left   • HERNIA REPAIR     • INTERVENTIONAL RADIOLOGY PROCEDURE N/A 2019    Procedure: Intravascular Ultrasound;  Surgeon: Mirza Munson MD;  Location: [facility] CATH INVASIVE LOCATION;  Service: Cardiology   • IN RT/LT HEART CATHETERS N/A 2019    Procedure: Percutaneous Coronary Intervention;  Surgeon: Mirza Munson MD;  Location: [facility] CATH INVASIVE LOCATION;  Service: Cardiology     Family History   Problem Relation Age of Onset   • Alzheimer's disease Mother    • GI problems Father    • Heart disease Father      Social History     Tobacco Use   • Smoking status: Former Smoker     Years: 15.00     Start date: 1966     Quit date: 5/10/2020     Years since quittin.4   • Smokeless tobacco: Never Used   Substance Use Topics   • Alcohol use: Not Currently     Frequency: Never   • Drug use: No     Allergies:  Haloperidol, Morphine, and Pantoprazole    Objective     Vital Signs  Temp:  [97.7 °F (36.5 °C)-98.2 °F (36.8 °C)] 97.7 °F (36.5 °C)  Heart Rate:  [69-72] 69  Resp:  [16-18] 16  BP: (150-155)/(74-93) 150/77      Physical Exam:      General Appearance:    Alert, cooperative, in no acute distress, obese and chronically ill-appearing   Head:    Normocephalic, without obvious abnormality, atraumatic   Eyes:           Conjunctivae and sclerae normal, no   icterus,  no pallor, corneas clear, PERRLA   Throat:   No oral lesions, no thrush, oral mucosa moist   Neck:   No adenopathy, supple, trachea midline, no thyromegaly, no   carotid bruit, no JVD   Lungs:     Clear to auscultation,respirations regular, even and                  unlabored, somewhat distant but adequate adventitial flow    Heart:    Regular rhythm and normal rate, normal S1 and S2, no            murmur, no gallop, no rub, no click, occasional ectopic   Chest Wall:    No abnormalities observed   Abdomen:     Normal bowel sounds, no masses, no organomegaly, soft        non-tender, non-distended, no guarding, no rebound                tenderness, massively obese   Rectal:     Deferred   Extremities:   Moves all extremities well, no edema, no cyanosis, no             redness   Pulses:   Pulses palpable and equal bilaterally   Skin:   No bleeding, bruising or rash   Lymph nodes:   No palpable adenopathy   Neurologic:   Cranial nerves 2 - 12 grossly intact, sensation intact, DTR       present and equal bilaterally       Labs:  Lab Results (last 24 hours)     Procedure Component Value Units Date/Time    POC Glucose Once [[id number]]  (Abnormal) Collected: 10/03/20 0720    Specimen: Blood Updated: 10/03/20 0724     Glucose 151 mg/dL      Comment: Serial Number: [id number]:  [id number]       Sedimentation Rate [[id number]]  (Abnormal) Collected: 10/03/20 0247    Specimen: Blood Updated: 10/03/20 0416     Sed Rate 35 mm/hr     Basic Metabolic Panel [[id number]]  (Abnormal) Collected: 10/03/20 0247    Specimen: Blood Updated: 10/03/20 0336     Glucose 149 mg/dL      BUN --     Comment: Testing performed by alternate method        Creatinine 1.26 mg/dL      Sodium 137 mmol/L      Potassium 4.5 mmol/L      Chloride 103 mmol/L      CO2 26.0 mmol/L      Calcium 8.6 mg/dL      eGFR Non African Amer 56 mL/min/1.73      BUN/Creatinine Ratio --     Comment: Testing not performed        Anion Gap 8.0 mmol/L     Narrative:       GFR Normal >60  Chronic Kidney Disease <60  Kidney Failure <15      BUN [[id number]] Collected: 10/03/20 0247    Specimen: Blood Updated: 10/03/20 0336    CBC & Differential [[id number]]  (Abnormal) Collected: 10/03/20 0247    Specimen: Blood Updated: 10/03/20 0319    Narrative:      The following orders were created for panel order CBC & Differential.  Procedure                               Abnormality         Status                     ---------                               -----------         ------                     CBC Auto Differential[[id number]]        Abnormal            Final result                 Please view results for these tests on the individual orders.    CBC Auto Differential [[id number]]  (Abnormal) Collected: 10/03/20 0247    Specimen: Blood Updated: 10/03/20 0319     WBC 7.20 10*3/mm3      RBC 4.01 10*6/mm3      Hemoglobin 12.3 g/dL      Hematocrit 35.9 %      MCV 89.7 fL      MCH 30.7 pg      MCHC 34.2 g/dL      RDW 15.1 %      RDW-SD 47.7 fl      MPV 7.7 fL      Platelets 315 10*3/mm3      Neutrophil % 64.3 %      Lymphocyte % 17.5 %      Monocyte % 9.0 %      Eosinophil % 8.0 %      Basophil % 1.2 %      Neutrophils, Absolute 4.60 10*3/mm3      Lymphocytes, Absolute 1.30 10*3/mm3      Monocytes, Absolute 0.60 10*3/mm3      Eosinophils, Absolute 0.60 10*3/mm3      Basophils, Absolute 0.10 10*3/mm3      nRBC 0.1 /100 WBC     Basic Metabolic Panel [[id number]]  (Abnormal) Collected: 10/02/20 2036    Specimen: Blood Updated: 10/03/20 0042     Glucose 163 mg/dL      BUN --     Comment: Testing performed by alternate method        Creatinine 1.20 mg/dL      Sodium 136 mmol/L      Potassium 4.2 mmol/L      Chloride 101 mmol/L      CO2 26.0 mmol/L      Calcium 8.7 mg/dL      eGFR Non African Amer 59 mL/min/1.73      BUN/Creatinine Ratio --     Comment: Testing not performed        Anion Gap 9.0 mmol/L     Narrative:      GFR Normal >60  Chronic Kidney Disease <60  Kidney Failure <15      BUN  [[id number]] Collected: 10/02/20 2355    Specimen: Blood Updated: 10/03/20 0042    CBC & Differential [[id number]]  (Abnormal) Collected: 10/02/20 2355    Specimen: Blood Updated: 10/03/20 0017    Narrative:      The following orders were created for panel order CBC & Differential.  Procedure                               Abnormality         Status                     ---------                               -----------         ------                     CBC Auto Differential[[id number]]        Abnormal            Final result                 Please view results for these tests on the individual orders.    CBC Auto Differential [[id number]]  (Abnormal) Collected: 10/02/20 2355    Specimen: Blood Updated: 10/03/20 0017     WBC 7.30 10*3/mm3      RBC 3.99 10*6/mm3      Hemoglobin 12.4 g/dL      Hematocrit 35.9 %      MCV 90.0 fL      MCH 31.2 pg      MCHC 34.6 g/dL      RDW 15.1 %      RDW-SD 46.8 fl      MPV 7.9 fL      Platelets 307 10*3/mm3      Neutrophil % 63.9 %      Lymphocyte % 17.3 %      Monocyte % 9.6 %      Eosinophil % 8.0 %      Basophil % 1.2 %      Neutrophils, Absolute 4.70 10*3/mm3      Lymphocytes, Absolute 1.30 10*3/mm3      Monocytes, Absolute 0.70 10*3/mm3      Eosinophils, Absolute 0.60 10*3/mm3      Basophils, Absolute 0.10 10*3/mm3      nRBC 0.0 /100 WBC     Blood Culture - Blood, Arm, Left [[id number]] Collected: 10/02/20 2353    Specimen: Blood from Arm, Left Updated: 10/03/20 0014    Blood Culture - Blood, Arm, Right [[id number]] Collected: 10/02/20 2355    Specimen: Blood from Arm, Right Updated: 10/03/20 0014    BUN [[id number]]  (Abnormal) Collected: 10/02/20 1401    Specimen: Blood Updated: 10/03/20 0011     BUN 26 mg/dL     Hemoglobin A1c [[id number]]  (Abnormal) Collected: 10/02/20 1401    Specimen: Blood Updated: 10/02/20 1950     Hemoglobin A1C 6.6 %     Narrative:      Hemoglobin A1C Reference Range:    <5.7 %        Normal  5.7-6.4 %     Increased risk for diabetes  > 6.4 %         Diabetes       These guidelines have been recommended by the American Diabetic Association for Hgb A1c.      The following 2010 guidelines have been recommended by the American Diabetes Association for Hemoglobin A1c.    HBA1c 5.7-6.4% Increased risk for future diabetes (pre-diabetes)  HBA1c     >6.4% Diabetes      POC Glucose Once [[id number]]  (Abnormal) Collected: 10/02/20 1934    Specimen: Blood Updated: 10/02/20 1935     Glucose 177 mg/dL      Comment: Serial Number: [id number]:  [id number]       POC Glucose Once [[id number]]  (Abnormal) Collected: 10/02/20 1826    Specimen: Blood Updated: 10/02/20 1827     Glucose 196 mg/dL      Comment: Serial Number: [id number]:  [id number]       C-reactive Protein [[id number]]  (Abnormal) Collected: 10/02/20 1401    Specimen: Blood Updated: 10/02/20 1723     C-Reactive Protein 2.53 mg/dL     Wound Culture - Wound, Foot, Right [[id number]] Collected: 10/02/20 1329    Specimen: Wound from Foot, Right Updated: 10/02/20 1550     Gram Stain Less than 10 WBCs seen      Rare (1+) Gram positive cocci      Rare (1+) Gram negative bacilli    Basic Metabolic Panel [[id number]]  (Abnormal) Collected: 10/02/20 1401    Specimen: Blood Updated: 10/02/20 1501     Glucose 127 mg/dL      BUN --     Comment: Testing performed by alternate method        Creatinine 1.15 mg/dL      Sodium 139 mmol/L      Potassium 4.3 mmol/L      Chloride 102 mmol/L      CO2 26.0 mmol/L      Calcium 8.9 mg/dL      eGFR Non African Amer 62 mL/min/1.73      BUN/Creatinine Ratio --     Comment: Testing not performed        Anion Gap 11.0 mmol/L     Narrative:      GFR Normal >60  Chronic Kidney Disease <60  Kidney Failure <15      CBC & Differential [[id number]]  (Abnormal) Collected: 10/02/20 1401    Specimen: Blood Updated: 10/02/20 1420    Narrative:      The following orders were created for panel order CBC & Differential.  Procedure                               Abnormality         Status                      ---------                               -----------         ------                     CBC Auto Differential[[id number]]        Abnormal            Final result                 Please view results for these tests on the individual orders.    CBC Auto Differential [[id number]]  (Abnormal) Collected: 10/02/20 1401    Specimen: Blood Updated: 10/02/20 1420     WBC 6.80 10*3/mm3      RBC 4.00 10*6/mm3      Hemoglobin 12.2 g/dL      Hematocrit 35.3 %      MCV 88.4 fL      MCH 30.5 pg      MCHC 34.5 g/dL      RDW 15.0 %      RDW-SD 46.8 fl      MPV 7.9 fL      Platelets 323 10*3/mm3      Neutrophil % 59.6 %      Lymphocyte % 21.6 %      Monocyte % 9.7 %      Eosinophil % 7.7 %      Basophil % 1.4 %      Neutrophils, Absolute 4.10 10*3/mm3      Lymphocytes, Absolute 1.50 10*3/mm3      Monocytes, Absolute 0.70 10*3/mm3      Eosinophils, Absolute 0.50 10*3/mm3      Basophils, Absolute 0.10 10*3/mm3      nRBC 0.0 /100 WBC     POC Glucose Once [[id number]]  (Abnormal) Collected: 10/02/20 1252    Specimen: Blood Updated: 10/02/20 1253     Glucose 135 mg/dL      Comment: Serial Number: [id number]:  [id number]             Radiology:  Xr Ankle 3+ View Right    Result Date: 10/2/2020   1. Normal right ankle. No acute osseous abnormality or evidence of osteomyelitis of the right ankle. 2. No  subcutaneous gas is seen.  Electronically Signed By-Dr. Ekaterina Goins MD On:10/2/2020 12:32 PM This report was finalized on [id number] by Dr. Ekaterina Goins MD.    Xr Foot 3+ View Right    Result Date: 10/2/2020  Findings consistent with ostomy myelitis with osseous destruction along lateral margin of the fifth metatarsal head. Small foci of soft tissue gas suggest soft tissue infection at this location as well.  Electronically Signed By-Antoinette August MD On:10/2/2020 12:32 PM This report was finalized on [id number] by  Antoinette August MD.    Xr Chest 1 View    Result Date: 10/2/2020  Mildly limited study demonstrating no active  disease.  Electronically Signed By-Shahriar Garcia On:10/2/2020 11:07 PM This report was finalized on [id number] by  Shahriar Garcia, .    Ct Lower Extremity Right Without Contrast    Result Date: 10/2/2020   1. Partially visualized wound lateral to the fifth metatarsal head with underlying soft tissue thickening and diffuse soft tissue edema. No loculated fluid collection is seen to suggest abscess. 2. Motion degraded study demonstrating no CT evidence of osteomyelitis. 3. Moderate to advanced multifocal arthropathic changes at the midfoot, likely developing Charcot arthropathy. 4. Thickening of the distal peroneus longus tendon, statistically tendinopathy, with a chronic ossification lateral to the peroneal tubercle. 5. Mild Achilles tendinopathy and chronic plantar fasciitis.  Electronically Signed By-Shahriar Garcia On:10/2/2020 9:19 PM This report was finalized on [id number] by  Shahriar Garcia, .        Results Review:    I reviewed the patient's new clinical results.  I reviewed the patient's new imaging results and agree with the interpretation.    Assessment/Plan       Osteomyelitis (CMS/[facility])  Type 2 diabetes with peripheral neuropathy  Diabetic foot ulcer with exposed bone right foot  Cellulitis of the right foot  Atrial fibrillation/status post pacemaker placement  COPD  History intermittent small bowel bowel obstructions, not a surgical candidate  Opioid dependence  Acute on chronic back pain  Essential hypertension    Vascular surgery and infectious disease input greatly appreciated, thank you.  Await decision from podiatry on whether patient will be taken to the OR versus bedside debridement.  Continue current parenteral antibiotics, pain control.  I will add Dilaudid as needed for his back pain which he is needed pretty much every hospitalization over the last couple of years mostly due to once again the hospital beds.  Continue rest of current treatment.    Of note, patient's aspirin and Effient have been  held in anticipation of surgical debridement of the right foot wound.  Patient's usual cardiologist Dr. Yip will be consulted although patient had denies any chest pain or shortness of breath or palpitations.  Will have cardiology on for preoperative clearance.  Patient did have stent placed in December 2019.    Expected Length of Stay 3-5 days    I discussed the patients findings and my recommendations with patient and nursing staff.     Jovana Dupont DO  10/03/20  08:28 EDT

## 2020-10-03 NOTE — PROGRESS NOTES
10/03/20   Foot and Ankle Surgery - Inpatient Follow-up  Provider: Dr. Josef Egan, [facility]  Location: [facility]    Chief Complaint: Diabetic foot infection, right    Subjective:  Ro Nathan is a 75 y.o. male. Chart reviewed. No acute events over night. Foot pain well-controlled. Denies nausea, fever, vomiting, chills.       Allergies   Allergen Reactions   • Haloperidol Hives   • Morphine Itching   • Pantoprazole Other (See Comments)     Feels sick after receiving       No current facility-administered medications on file prior to encounter.      Current Outpatient Medications on File Prior to Encounter   Medication Sig Dispense Refill   • aspirin 81 MG EC tablet Take 1 tablet by mouth Daily. 30 tablet 1   • atorvastatin (LIPITOR) 40 MG tablet Take 1 tablet by mouth Every Night. 30 tablet 1   • digoxin (LANOXIN) 250 MCG tablet Take 250 mcg by mouth Daily.     • diltiaZEM CD (CARDIZEM CD) 120 MG 24 hr capsule Take 120 mg by mouth Daily.     • furosemide (LASIX) 40 MG tablet Take 40 mg by mouth Daily.     • losartan (COZAAR) 100 MG tablet Take 100 mg by mouth Daily.     • LYRICA 150 MG capsule Take 150 mg by mouth 2 (Two) Times a Day.     • metoprolol tartrate (LOPRESSOR) 25 MG tablet Take 1 tablet by mouth Every 12 (Twelve) Hours. (Patient taking differently: Take 25 mg by mouth Every 12 (Twelve) Hours. Verified with [facility] pharmacy) 60 tablet 1   • mirtazapine (REMERON) 30 MG tablet Take 30 mg by mouth Every Night.     • oxyCODONE-acetaminophen (Percocet)  MG per tablet Take 1 tablet by mouth Every 6 (Six) Hours As Needed for Moderate Pain . 120 tablet 0   • potassium chloride (K-DUR) 10 MEQ CR tablet Take 10 mEq by mouth 2 (Two) Times a Day.     • prasugrel (EFFIENT) 10 MG tablet Take 10 mg by mouth Daily.     • traZODone (DESYREL) 100 MG tablet Take 100 mg by mouth Every Night.     • VENTOLIN  (90 Base) MCG/ACT inhaler Inhale 2 puffs Every 6 (Six) Hours As Needed for  Wheezing or Shortness of Air.         Objective   /86 (BP Location: Right arm, Patient Position: Lying)   Pulse 71   Temp 97.7 °F (36.5 °C) (Oral)   Resp 15   Wt 113 kg (248 lb 14.4 oz)   SpO2 96%   BMI 33.76 kg/m²     General: Alert and oriented x3. In good spirits.   Vascular: Pulses non-palpable. Erythema has improved. Skin temperature of right lower extremity comparable to left lower extremity. There is no evidence of lymphangitis.  Neuro: Epicritic sensation diminished to the level digits, bilaterally.   MSK: Muscle strength is 5/5 in all directions crossing the ankle joint. Range of motion about his ankle and all other joints of the foot is smooth and pain-free. The styloid of his 5th metatarsal base appears prominent bilaterally but there are no wounds or hyperkeratoses noted in these areas.   Derm: The wound is noted to the lateral aspect of his 5th metatarsal head and measures 1.5 x 2.0 x 0.3 cm to the level of capsule. The wound is nummular with indurated edges. The wound base is 90/10, fibrotic/granular. There is no malodor, lymphangitis, purulence appreciated. Upon proximal to distal compression of the wound, a scant amount of sanguinous drainage was expressed.       Results from last 7 days   Lab Units 10/03/20  0247   WBC 10*3/mm3 7.20   HEMOGLOBIN g/dL 12.3*   HEMATOCRIT % 35.9*   PLATELETS 10*3/mm3 315       Assessment/Plan     Patient Active Problem List   Diagnosis   • Skin ulcer (CMS/HCC)   • Chronic low back pain   • DDD (degenerative disc disease), lumbar   • Spondylosis of lumbosacral region   • Atrial fibrillation (CMS/HCC)   • Chronic obstructive pulmonary disease (CMS/HCC)   • Coronary artery disease   • Gastroesophageal reflux disease   • Hyperlipidemia   • Essential hypertension   • Lumbar radiculopathy   • Other hammer toe(s) (acquired), right foot   • Presence of cardiac pacemaker   • Status post coronary artery stent placement   • Lumbar facet joint pain   • COPD  exacerbation (CMS/HCC)   • COPD (chronic obstructive pulmonary disease) (CMS/HCC)   • Unstable angina (CMS/HCC)   • Stented coronary artery   • Small bowel obstruction due to adhesions (CMS/HCC)   • Elevated lipoprotein(a)   • Generalized abdominal pain   • Small bowel obstruction (CMS/HCC)   • SBO (small bowel obstruction) (CMS/HCC)   • Osteomyelitis (CMS/HCC)       -Osteomyelitis, right foot  -Type 2 diabetes mellitus with peripheral neuropathy  -Diabetic foot ulcer with exposed bone, right foot  -Cellulitis of right foot     -Continue antibiotics per infectious disease. Final cx pending.  -RASHARD show adequate perfusion to digits.   -XR show osteomyelitis with small foci of gas.  -CT negative for osteomyelitis.   -Bedside I&D performed. See procedure note.  -NWB to right foot.  -Continue betadine WTD dressings.  -To OR on Monday for thorough I&D with bone biopsy.  -EKG pending for pre-op clearance.   -I will continue to follow closely.  -This case was discussed with the RN. Please call with questions.     Josef Egna DPM  [facility]  Foot and Ankle Surgery  10/3/2020  13:50 EDT    Note is dictated utilizing voice recognition software. Unfortunately this leads to occasional typographical errors. I apologize in advance if the situation occurs. If questions occur please do not hesitate to call our office.

## 2020-10-04 ENCOUNTER — APPOINTMENT (OUTPATIENT)
Dept: NUCLEAR MEDICINE | Facility: HOSPITAL | Age: 75
End: 2020-10-04

## 2020-10-04 LAB
ALBUMIN SERPL-MCNC: 3.5 G/DL (ref 3.5–5.2)
ALBUMIN/GLOB SERPL: 1.2 G/DL
ALP SERPL-CCNC: 195 U/L (ref 39–117)
ALT SERPL W P-5'-P-CCNC: 14 U/L (ref 1–41)
ANION GAP SERPL CALCULATED.3IONS-SCNC: 8 MMOL/L (ref 5–15)
AST SERPL-CCNC: 41 U/L (ref 1–40)
BASOPHILS # BLD AUTO: 0.2 10*3/MM3 (ref 0–0.2)
BASOPHILS NFR BLD AUTO: 2.6 % (ref 0–1.5)
BILIRUB SERPL-MCNC: 1 MG/DL (ref 0–1.2)
BUN SERPL-MCNC: 18 MG/DL (ref 8–23)
BUN SERPL-MCNC: ABNORMAL MG/DL
BUN/CREAT SERPL: ABNORMAL
CALCIUM SPEC-SCNC: 8.7 MG/DL (ref 8.6–10.5)
CHLORIDE SERPL-SCNC: 103 MMOL/L (ref 98–107)
CO2 SERPL-SCNC: 25 MMOL/L (ref 22–29)
CREAT SERPL-MCNC: 1.28 MG/DL (ref 0.76–1.27)
DEPRECATED RDW RBC AUTO: 48.1 FL (ref 37–54)
EOSINOPHIL # BLD AUTO: 0.6 10*3/MM3 (ref 0–0.4)
EOSINOPHIL NFR BLD AUTO: 8.6 % (ref 0.3–6.2)
ERYTHROCYTE [DISTWIDTH] IN BLOOD BY AUTOMATED COUNT: 15.4 % (ref 12.3–15.4)
GFR SERPL CREATININE-BSD FRML MDRD: 55 ML/MIN/1.73
GLOBULIN UR ELPH-MCNC: 3 GM/DL
GLUCOSE BLDC GLUCOMTR-MCNC: 119 MG/DL (ref 70–105)
GLUCOSE BLDC GLUCOMTR-MCNC: 137 MG/DL (ref 70–105)
GLUCOSE BLDC GLUCOMTR-MCNC: 164 MG/DL (ref 70–105)
GLUCOSE BLDC GLUCOMTR-MCNC: 227 MG/DL (ref 70–105)
GLUCOSE SERPL-MCNC: 171 MG/DL (ref 65–99)
HCT VFR BLD AUTO: 37 % (ref 37.5–51)
HGB BLD-MCNC: 12.7 G/DL (ref 13–17.7)
LYMPHOCYTES # BLD AUTO: 1.2 10*3/MM3 (ref 0.7–3.1)
LYMPHOCYTES NFR BLD AUTO: 16.1 % (ref 19.6–45.3)
MCH RBC QN AUTO: 30.8 PG (ref 26.6–33)
MCHC RBC AUTO-ENTMCNC: 34.4 G/DL (ref 31.5–35.7)
MCV RBC AUTO: 89.4 FL (ref 79–97)
MONOCYTES # BLD AUTO: 0.5 10*3/MM3 (ref 0.1–0.9)
MONOCYTES NFR BLD AUTO: 6.9 % (ref 5–12)
NEUTROPHILS NFR BLD AUTO: 4.9 10*3/MM3 (ref 1.7–7)
NEUTROPHILS NFR BLD AUTO: 65.8 % (ref 42.7–76)
NRBC BLD AUTO-RTO: 0.1 /100 WBC (ref 0–0.2)
PLATELET # BLD AUTO: 330 10*3/MM3 (ref 140–450)
PMV BLD AUTO: 7.9 FL (ref 6–12)
POTASSIUM SERPL-SCNC: 4.5 MMOL/L (ref 3.5–5.2)
PROT SERPL-MCNC: 6.5 G/DL (ref 6–8.5)
RBC # BLD AUTO: 4.14 10*6/MM3 (ref 4.14–5.8)
SODIUM SERPL-SCNC: 136 MMOL/L (ref 136–145)
VANCOMYCIN TROUGH SERPL-MCNC: 18.3 MCG/ML (ref 5–20)
WBC # BLD AUTO: 7.5 10*3/MM3 (ref 3.4–10.8)

## 2020-10-04 PROCEDURE — 25010000002 VANCOMYCIN 10 G RECONSTITUTED SOLUTION: Performed by: FAMILY MEDICINE

## 2020-10-04 PROCEDURE — A9503 TC99M MEDRONATE: HCPCS | Performed by: FAMILY MEDICINE

## 2020-10-04 PROCEDURE — 99232 SBSQ HOSP IP/OBS MODERATE 35: CPT | Performed by: INTERNAL MEDICINE

## 2020-10-04 PROCEDURE — 0 TECHNETIUM MEDRONATE KIT: Performed by: FAMILY MEDICINE

## 2020-10-04 PROCEDURE — 82962 GLUCOSE BLOOD TEST: CPT

## 2020-10-04 PROCEDURE — 80202 ASSAY OF VANCOMYCIN: CPT | Performed by: FAMILY MEDICINE

## 2020-10-04 PROCEDURE — 25010000002 CEFEPIME PER 500 MG: Performed by: FAMILY MEDICINE

## 2020-10-04 PROCEDURE — 85025 COMPLETE CBC W/AUTO DIFF WBC: CPT | Performed by: FAMILY MEDICINE

## 2020-10-04 PROCEDURE — 25010000002 HYDROMORPHONE PER 4 MG: Performed by: FAMILY MEDICINE

## 2020-10-04 PROCEDURE — 80053 COMPREHEN METABOLIC PANEL: CPT | Performed by: INTERNAL MEDICINE

## 2020-10-04 PROCEDURE — 94799 UNLISTED PULMONARY SVC/PX: CPT

## 2020-10-04 PROCEDURE — 78315 BONE IMAGING 3 PHASE: CPT

## 2020-10-04 RX ORDER — TC 99M MEDRONATE 20 MG/10ML
26.4 INJECTION, POWDER, LYOPHILIZED, FOR SOLUTION INTRAVENOUS
Status: COMPLETED | OUTPATIENT
Start: 2020-10-04 | End: 2020-10-04

## 2020-10-04 RX ADMIN — LOSARTAN POTASSIUM 100 MG: 50 TABLET, FILM COATED ORAL at 09:32

## 2020-10-04 RX ADMIN — PREGABALIN 150 MG: 75 CAPSULE ORAL at 09:32

## 2020-10-04 RX ADMIN — METOPROLOL TARTRATE 37.5 MG: 25 TABLET, FILM COATED ORAL at 09:32

## 2020-10-04 RX ADMIN — VANCOMYCIN HYDROCHLORIDE 1500 MG: 10 INJECTION, POWDER, LYOPHILIZED, FOR SOLUTION INTRAVENOUS at 05:43

## 2020-10-04 RX ADMIN — VANCOMYCIN HYDROCHLORIDE 1500 MG: 10 INJECTION, POWDER, LYOPHILIZED, FOR SOLUTION INTRAVENOUS at 17:30

## 2020-10-04 RX ADMIN — HYDROMORPHONE HYDROCHLORIDE 1 MG: 2 INJECTION, SOLUTION INTRAMUSCULAR; INTRAVENOUS; SUBCUTANEOUS at 21:57

## 2020-10-04 RX ADMIN — Medication: at 14:06

## 2020-10-04 RX ADMIN — POTASSIUM CHLORIDE 10 MEQ: 750 TABLET, EXTENDED RELEASE ORAL at 21:11

## 2020-10-04 RX ADMIN — DIGOXIN 250 MCG: 250 TABLET ORAL at 11:46

## 2020-10-04 RX ADMIN — Medication 10 ML: at 21:12

## 2020-10-04 RX ADMIN — HYDROMORPHONE HYDROCHLORIDE 1 MG: 2 INJECTION, SOLUTION INTRAMUSCULAR; INTRAVENOUS; SUBCUTANEOUS at 01:01

## 2020-10-04 RX ADMIN — HYDROMORPHONE HYDROCHLORIDE 1 MG: 2 INJECTION, SOLUTION INTRAMUSCULAR; INTRAVENOUS; SUBCUTANEOUS at 18:12

## 2020-10-04 RX ADMIN — METOPROLOL TARTRATE 37.5 MG: 25 TABLET, FILM COATED ORAL at 21:10

## 2020-10-04 RX ADMIN — TC 99M MEDRONATE 26.4 MILLICURIE: 20 INJECTION, POWDER, LYOPHILIZED, FOR SOLUTION INTRAVENOUS at 10:00

## 2020-10-04 RX ADMIN — HYDROMORPHONE HYDROCHLORIDE 1 MG: 2 INJECTION, SOLUTION INTRAMUSCULAR; INTRAVENOUS; SUBCUTANEOUS at 09:32

## 2020-10-04 RX ADMIN — HYDROMORPHONE HYDROCHLORIDE 1 MG: 2 INJECTION, SOLUTION INTRAMUSCULAR; INTRAVENOUS; SUBCUTANEOUS at 05:09

## 2020-10-04 RX ADMIN — IPRATROPIUM BROMIDE AND ALBUTEROL SULFATE 3 ML: 2.5; .5 SOLUTION RESPIRATORY (INHALATION) at 15:03

## 2020-10-04 RX ADMIN — POTASSIUM CHLORIDE 10 MEQ: 750 TABLET, EXTENDED RELEASE ORAL at 09:32

## 2020-10-04 RX ADMIN — IPRATROPIUM BROMIDE AND ALBUTEROL SULFATE 3 ML: 2.5; .5 SOLUTION RESPIRATORY (INHALATION) at 08:01

## 2020-10-04 RX ADMIN — HYDROMORPHONE HYDROCHLORIDE 1 MG: 2 INJECTION, SOLUTION INTRAMUSCULAR; INTRAVENOUS; SUBCUTANEOUS at 14:05

## 2020-10-04 RX ADMIN — PREGABALIN 150 MG: 75 CAPSULE ORAL at 21:11

## 2020-10-04 RX ADMIN — ASPIRIN 81 MG: 81 TABLET, COATED ORAL at 09:32

## 2020-10-04 RX ADMIN — CEFEPIME HYDROCHLORIDE 2 G: 2 INJECTION, POWDER, FOR SOLUTION INTRAVENOUS at 08:41

## 2020-10-04 RX ADMIN — DILTIAZEM HYDROCHLORIDE 120 MG: 120 CAPSULE, COATED, EXTENDED RELEASE ORAL at 09:32

## 2020-10-04 RX ADMIN — Medication: at 05:43

## 2020-10-04 RX ADMIN — CEFEPIME HYDROCHLORIDE 2 G: 2 INJECTION, POWDER, FOR SOLUTION INTRAVENOUS at 00:58

## 2020-10-04 RX ADMIN — Medication: at 04:25

## 2020-10-04 RX ADMIN — IPRATROPIUM BROMIDE AND ALBUTEROL SULFATE 3 ML: 2.5; .5 SOLUTION RESPIRATORY (INHALATION) at 12:05

## 2020-10-04 RX ADMIN — Medication 10 ML: at 08:42

## 2020-10-04 RX ADMIN — CEFEPIME HYDROCHLORIDE 2 G: 2 INJECTION, POWDER, FOR SOLUTION INTRAVENOUS at 16:35

## 2020-10-04 RX ADMIN — ATORVASTATIN CALCIUM 40 MG: 40 TABLET, FILM COATED ORAL at 21:11

## 2020-10-04 NOTE — PROGRESS NOTES
10/04/20   Foot and Ankle Surgery - Inpatient Follow-up  Provider: Dr. Josef Egan, [place]  Location: [facility], [facility]    Chief Complaint: Diabetic foot infection, right    Subjective:  Ro Nathan is a 75 y.o. male. Chart reviewed. No acute events over night.  Patient agreeable to surgery tomorrow.  All questions answered with no guarantees given.      Allergies   Allergen Reactions   • Haloperidol Hives   • Morphine Itching   • Pantoprazole Other (See Comments)     Feels sick after receiving       No current facility-administered medications on file prior to encounter.      Current Outpatient Medications on File Prior to Encounter   Medication Sig Dispense Refill   • aspirin 81 MG EC tablet Take 1 tablet by mouth Daily. 30 tablet 1   • atorvastatin (LIPITOR) 40 MG tablet Take 1 tablet by mouth Every Night. 30 tablet 1   • digoxin (LANOXIN) 250 MCG tablet Take 250 mcg by mouth Daily.     • diltiaZEM CD (CARDIZEM CD) 120 MG 24 hr capsule Take 120 mg by mouth Daily.     • furosemide (LASIX) 40 MG tablet Take 40 mg by mouth Daily.     • losartan (COZAAR) 100 MG tablet Take 100 mg by mouth Daily.     • LYRICA 150 MG capsule Take 150 mg by mouth 2 (Two) Times a Day.     • metoprolol tartrate (LOPRESSOR) 25 MG tablet Take 1 tablet by mouth Every 12 (Twelve) Hours. (Patient taking differently: Take 25 mg by mouth Every 12 (Twelve) Hours. Verified with [facility] pharmacy) 60 tablet 1   • mirtazapine (REMERON) 30 MG tablet Take 30 mg by mouth Every Night.     • oxyCODONE-acetaminophen (Percocet)  MG per tablet Take 1 tablet by mouth Every 6 (Six) Hours As Needed for Moderate Pain . 120 tablet 0   • potassium chloride (K-DUR) 10 MEQ CR tablet Take 10 mEq by mouth 2 (Two) Times a Day.     • prasugrel (EFFIENT) 10 MG tablet Take 10 mg by mouth Daily.     • traZODone (DESYREL) 100 MG tablet Take 100 mg by mouth Every Night.     • VENTOLIN  (90 Base) MCG/ACT inhaler Inhale 2 puffs Every 6 (Six)  Hours As Needed for Wheezing or Shortness of Air.         Objective   /95 (BP Location: Right arm, Patient Position: Lying)   Pulse 72   Temp 97.7 °F (36.5 °C) (Oral)   Resp 18   Wt 113 kg (249 lb 1.9 oz)   SpO2 94%   BMI 33.79 kg/m²     General: Alert and oriented x3. In good spirits.   Vascular: Pulses non-palpable. Erythema has improved. Skin temperature of right lower extremity comparable to left lower extremity. There is no evidence of lymphangitis.  Neuro: Epicritic sensation diminished to the level digits, bilaterally.   MSK: Muscle strength is 5/5 in all directions crossing the ankle joint. Range of motion about his ankle and all other joints of the foot is smooth and pain-free. The styloid of his 5th metatarsal base appears prominent bilaterally but there are no wounds or hyperkeratoses noted in these areas.   Derm: The wound is noted to the lateral aspect of his 5th metatarsal head and measures 1.5 x 2.0 x 0.3 cm to the level of capsule. The wound is nummular with indurated edges. The wound base is 90/10, fibrotic/granular. There is no malodor, lymphangitis, purulence appreciated. Upon proximal to distal compression of the wound, a scant amount of sanguinous drainage was expressed.       Results from last 7 days   Lab Units 10/04/20  0420   WBC 10*3/mm3 7.50   HEMOGLOBIN g/dL 12.7*   HEMATOCRIT % 37.0*   PLATELETS 10*3/mm3 330       Assessment/Plan     Patient Active Problem List   Diagnosis   • Skin ulcer (CMS/HCC)   • Chronic low back pain   • DDD (degenerative disc disease), lumbar   • Spondylosis of lumbosacral region   • Atrial fibrillation (CMS/HCC)   • Chronic obstructive pulmonary disease (CMS/HCC)   • Coronary artery disease   • Gastroesophageal reflux disease   • Hyperlipidemia   • Essential hypertension   • Lumbar radiculopathy   • Other hammer toe(s) (acquired), right foot   • Presence of cardiac pacemaker   • Status post coronary artery stent placement   • Lumbar facet joint pain    • COPD exacerbation (CMS/HCC)   • COPD (chronic obstructive pulmonary disease) (CMS/HCC)   • Unstable angina (CMS/HCC)   • Stented coronary artery   • Small bowel obstruction due to adhesions (CMS/HCC)   • Elevated lipoprotein(a)   • Generalized abdominal pain   • Small bowel obstruction (CMS/HCC)   • SBO (small bowel obstruction) (CMS/HCC)   • Osteomyelitis (CMS/HCC)       -Osteomyelitis, right foot  -Type 2 diabetes mellitus with peripheral neuropathy  -Diabetic foot ulcer with exposed bone, right foot  -Cellulitis of right foot     -Continue antibiotics per infectious disease. Final cx showing scant growth of gram neg bacilli.  -CT negative for osteomyelitis. I do not recommend 3-phase bone scan as he will be going to OR tomorrow for I&D with bone bx.   -NWB to right foot.  -Continue betadine WTD dressings.  -To OR tomorrow I&D with bone biopsy.  N.p.o. after midnight, obtain consent, hold Lovenox today.  -Cardiology cleared patient for surgery.  -This case was discussed with the RN. Please call with questions.     Josef Egan DPM  [facility]  Foot and Ankle Surgery  10/4/2020  09:10 EDT    Note is dictated utilizing voice recognition software. Unfortunately this leads to occasional typographical errors. I apologize in advance if the situation occurs. If questions occur please do not hesitate to call our office.

## 2020-10-04 NOTE — PROGRESS NOTES
Pharmacy Antimicrobial Dosing Service    Subjective:  Ro Nathan is a 75 y.o.male admitted with osteomyelitis of R 5th metatarsal head. Pharmacy has been consulted to dose Vancomycin for possible SSTI.  Plan for OR Monday for I&D + bone biopsy.    PMH: DM      Assessment/Plan    1. Day #3 Vancomycin: Goal -600 mcg*h/mL.   Pt received load dose of 2000mg x1 (~22 mg/kg DBW) followed by maintenance dosing of 1500mg Q12h (~16 mg/kg DBW). Based on levels obtained today at steady state, calculated k= 0.057-hr, AUC= 581, true trough= 18. D/t therapeutic AUC, will continue current regimen. Repeat trough 10/6 (or sooner if clinically warranted).    2. Day #3 Cefepime: 2gm IV Q8h for estestCrCl > 60 mL/min.    Will continue to monitor drug levels, renal function, culture and sensitivities, and patient clinical status.       Objective:  Relevant clinical data and objective history reviewed:      113 kg (249 lb 1.9 oz)   Ideal body weight: 77.6 kg (171 lb 1.2 oz)  Adjusted ideal body weight: 91.8 kg (202 lb 4.7 oz)  Body mass index is 33.79 kg/m².    Results from last 7 days   Lab Units 10/04/20  0420 10/03/20  2054   VANCOMYCIN PK mcg/mL  --  27.90   VANCOMYCIN TR mcg/mL 18.30  --      Results from last 7 days   Lab Units 10/04/20  0420 10/03/20  0247 10/02/20  2355   CREATININE mg/dL 1.28* 1.26 1.20     Estimated Creatinine Clearance: 65.7 mL/min (A) (by C-G formula based on SCr of 1.28 mg/dL (H)).  I/O last 3 completed shifts:  In: 1920 [P.O.:720; IV Piggyback:1200]  Out: 250 [Urine:250]    Results from last 7 days   Lab Units 10/04/20  0420 10/03/20  0247 10/02/20  2355   WBC 10*3/mm3 7.50 7.20 7.30     Temperature    10/03/20 1226 10/03/20 1912 10/04/20 0348   Temp: 97.7 °F (36.5 °C) 98.4 °F (36.9 °C) 97.7 °F (36.5 °C)     Baseline culture/source/susceptibility:  Microbiology Results (last 10 days)       Procedure Component Value - Date/Time    Blood Culture - Blood, Arm, Right [[id number]] Collected: 10/02/20  2355    Lab Status: Preliminary result Specimen: Blood from Arm, Right Updated: 10/04/20 0015     Blood Culture No growth at 24 hours    Blood Culture - Blood, Arm, Left [[id number]] Collected: 10/02/20 2353    Lab Status: Preliminary result Specimen: Blood from Arm, Left Updated: 10/04/20 0015     Blood Culture No growth at 24 hours    Wound Culture - Wound, Foot, Right [[id number]]  (Abnormal) Collected: 10/02/20 1329    Lab Status: Preliminary result Specimen: Wound from Foot, Right Updated: 10/04/20 0705     Wound Culture Scant growth (1+) Gram Negative Bacilli      Scant growth (1+) Normal Skin Janeen     Gram Stain Less than 10 WBCs seen      Rare (1+) Gram positive cocci      Rare (1+) Gram negative bacilli             Anti-Infectives (From admission, onward)      Ordered     Dose/Rate Route Frequency Start Stop    10/02/20 1545  !Vancomycin Level Draw Needed     Ordering Provider: Yusuf Jones MD     Does not apply Once 10/04/20 0400 10/04/20 0543    10/02/20 1545  !Vancomycin Level Draw Needed     Ordering Provider: Yusuf Jones MD     Does not apply Once 10/03/20 2100 10/03/20 2158    10/02/20 1545  vancomycin 1500 mg/500 mL 0.9% NS IVPB ([facility])     Ordering Provider: Yusuf Jones MD    1,500 mg  over 90 Minutes Intravenous Every 12 Hours 10/03/20 0500 10/08/20 0459    10/02/20 1734  cefepime 2 gm IVPB in 100 ml NS ([facility])     Ordering Provider: Yusuf Jones MD    2 g  over 30 Minutes Intravenous Every 8 Hours 10/03/20 0000 10/12/20 2359    10/02/20 1545  vancomycin 2000 mg/500 mL 0.9% NS IVPB ([facility])     Ordering Provider: Josef Egan DPM    2,000 mg  over 120 Minutes Intravenous Once 10/02/20 1700 10/02/20 1822    10/02/20 1326  Pharmacy to dose vancomycin     Ordering Provider: Yusuf Jones MD     Does not apply Every 12 Hours 10/02/20 1415 10/07/20 1414    10/02/20 1326  piperacillin-tazobactam (ZOSYN) IVPB 3.375 g in 100 mL NS (CD)     Ordering Provider: Josef Egan DPM     3.375 g  over 30 Minutes Intravenous Once 10/02/20 1345 10/02/20 1555            Kizzy Navarro PharmD, BCPS  10/04/20 11:33 EDT

## 2020-10-04 NOTE — PLAN OF CARE
Goal Outcome Evaluation:  Plan of Care Reviewed With: patient     Outcome Summary: Patient in bed eyes closed resting.  Patient has been awake off and on throughout the night.  Pleasant and cooperative. Patient tolerated dressing change on right foot well.  PRN IV pain meds given and effective.  Continues on IV ABT's.  Will continue to monitor.

## 2020-10-04 NOTE — PROGRESS NOTES
LOS: 2 days   Admiting Physician- Yusuf Jones MD    Reason For Followup:    Preoperative clearance  Chronic atrial fibrillation  CAD  Hypertension  Status post LAD stenting    Subjective     Patient is chest pain-free no shortness of breath    Objective     No dyspnea    Review of Systems:   Review of Systems   Constitution: Negative for chills and fever.   HENT: Negative for ear discharge and nosebleeds.    Eyes: Negative for discharge and redness.   Cardiovascular: Negative for chest pain, orthopnea, palpitations, paroxysmal nocturnal dyspnea and syncope.   Respiratory: Negative for cough, shortness of breath and wheezing.    Endocrine: Negative for heat intolerance.   Skin: Negative for rash.   Musculoskeletal: Positive for arthritis and joint pain. Negative for myalgias.   Gastrointestinal: Negative for abdominal pain, melena, nausea and vomiting.   Genitourinary: Negative for dysuria and hematuria.   Neurological: Negative for dizziness, light-headedness, numbness and tremors.   Psychiatric/Behavioral: Negative for depression. The patient is not nervous/anxious.          Vital Signs  Vitals:    10/04/20 0348 10/04/20 0350 10/04/20 0801 10/04/20 0806   BP: 157/95      BP Location: Right arm      Patient Position: Lying      Pulse: 78  72 72   Resp: 18  18 18   Temp: 97.7 °F (36.5 °C)      TempSrc: Oral      SpO2: 95%  94%    Weight:  113 kg (249 lb 1.9 oz)       Wt Readings from Last 1 Encounters:   10/04/20 113 kg (249 lb 1.9 oz)       Intake/Output Summary (Last 24 hours) at 10/4/2020 1100  Last data filed at 10/4/2020 0802  Gross per 24 hour   Intake 1200 ml   Output --   Net 1200 ml     Physical Exam:  Constitutional:       Appearance: Well-developed.   Eyes:      General: No scleral icterus.        Right eye: No discharge.   HENT:      Head: Normocephalic and atraumatic.   Neck:      Thyroid: No thyromegaly.      Lymphadenopathy: No cervical adenopathy.   Pulmonary:      Effort: Pulmonary effort is  normal. No respiratory distress.      Breath sounds: Normal breath sounds. No wheezing. No rales.   Cardiovascular:      Normal rate. Regular rhythm.      No gallop.   Edema:     Peripheral edema absent.   Abdominal:      Tenderness: There is no abdominal tenderness.   Skin:     Findings: No erythema or rash.   Neurological:      Mental Status: Alert and oriented to person, place, and time.         Results Review:   Lab Results (last 24 hours)     Procedure Component Value Units Date/Time    POC Glucose Once [[id number]]  (Abnormal) Collected: 10/04/20 0758    Specimen: Blood Updated: 10/04/20 0759     Glucose 227 mg/dL      Comment: Serial Number: [id number]:  [id number]       Wound Culture - Wound, Foot, Right [[id number]]  (Abnormal) Collected: 10/02/20 1329    Specimen: Wound from Foot, Right Updated: 10/04/20 0705     Wound Culture Scant growth (1+) Gram Negative Bacilli      Scant growth (1+) Normal Skin Janeen     Gram Stain Less than 10 WBCs seen      Rare (1+) Gram positive cocci      Rare (1+) Gram negative bacilli    BUN [[id number]]  (Normal) Collected: 10/04/20 0420    Specimen: Blood Updated: 10/04/20 0603     BUN 18 mg/dL     Vancomycin, Trough [[id number]]  (Normal) Collected: 10/04/20 0420    Specimen: Blood Updated: 10/04/20 0513     Vancomycin Trough 18.30 mcg/mL     Comprehensive Metabolic Panel [[id number]]  (Abnormal) Collected: 10/04/20 0420    Specimen: Blood Updated: 10/04/20 0513     Glucose 171 mg/dL      BUN --     Comment: Testing performed by alternate method        Creatinine 1.28 mg/dL      Sodium 136 mmol/L      Potassium 4.5 mmol/L      Chloride 103 mmol/L      CO2 25.0 mmol/L      Calcium 8.7 mg/dL      Total Protein 6.5 g/dL      Albumin 3.50 g/dL      ALT (SGPT) 14 U/L      AST (SGOT) 41 U/L      Alkaline Phosphatase 195 U/L      Total Bilirubin 1.0 mg/dL      eGFR Non African Amer 55 mL/min/1.73      Globulin 3.0 gm/dL      A/G Ratio 1.2 g/dL      BUN/Creatinine Ratio --      Comment: Testing not performed        Anion Gap 8.0 mmol/L     Narrative:      GFR Normal >60  Chronic Kidney Disease <60  Kidney Failure <15      CBC & Differential [[id number]]  (Abnormal) Collected: 10/04/20 0420    Specimen: Blood Updated: 10/04/20 0437    Narrative:      The following orders were created for panel order CBC & Differential.  Procedure                               Abnormality         Status                     ---------                               -----------         ------                     CBC Auto Differential[[id number]]        Abnormal            Final result                 Please view results for these tests on the individual orders.    CBC Auto Differential [[id number]]  (Abnormal) Collected: 10/04/20 0420    Specimen: Blood Updated: 10/04/20 0437     WBC 7.50 10*3/mm3      RBC 4.14 10*6/mm3      Hemoglobin 12.7 g/dL      Hematocrit 37.0 %      MCV 89.4 fL      MCH 30.8 pg      MCHC 34.4 g/dL      RDW 15.4 %      RDW-SD 48.1 fl      MPV 7.9 fL      Platelets 330 10*3/mm3      Neutrophil % 65.8 %      Lymphocyte % 16.1 %      Monocyte % 6.9 %      Eosinophil % 8.6 %      Basophil % 2.6 %      Neutrophils, Absolute 4.90 10*3/mm3      Lymphocytes, Absolute 1.20 10*3/mm3      Monocytes, Absolute 0.50 10*3/mm3      Eosinophils, Absolute 0.60 10*3/mm3      Basophils, Absolute 0.20 10*3/mm3      nRBC 0.1 /100 WBC     Blood Culture - Blood, Arm, Left [[id number]] Collected: 10/02/20 2353    Specimen: Blood from Arm, Left Updated: 10/04/20 0015     Blood Culture No growth at 24 hours    Blood Culture - Blood, Arm, Right [[id number]] Collected: 10/02/20 2355    Specimen: Blood from Arm, Right Updated: 10/04/20 0015     Blood Culture No growth at 24 hours    Vancomycin, Peak [[id number]]  (Normal) Collected: 10/03/20 2054    Specimen: Blood Updated: 10/03/20 2124     Vancomycin Peak 27.90 mcg/mL     POC Glucose Once [[id number]]  (Abnormal) Collected: 10/03/20 1910    Specimen: Blood Updated: 10/03/20  1911     Glucose 189 mg/dL      Comment: Serial Number: [id number]:  [id number]       POC Glucose Once [[id number]]  (Abnormal) Collected: 10/03/20 1632    Specimen: Blood Updated: 10/03/20 1633     Glucose 217 mg/dL      Comment: Serial Number: [id number]:  [id number]       POC Glucose Once [[id number]]  (Abnormal) Collected: 10/03/20 1236    Specimen: Blood Updated: 10/03/20 1237     Glucose 204 mg/dL      Comment: Serial Number: [id number]:  [id number]           Imaging Results (Last 72 Hours)     Procedure Component Value Units Date/Time    NM Bone Scan 3 Phase [[id number]] Resulted: 10/04/20 1013     Updated: 10/04/20 1013    XR Chest 1 View [[id number]] Collected: 10/02/20 2306     Updated: 10/02/20 2310    Narrative:      DATE OF EXAM:  10/2/2020 10:35 PM     PROCEDURE:  XR CHEST 1 VW-     INDICATIONS:  SOB     COMPARISON:  Chest radiograph 12/8/2019 and 12/3/2019. CT abdomen pelvis 08/04/2020.     TECHNIQUE:   Single radiographic AP view of the chest was obtained.     FINDINGS:  The study is mildly limited by lordotic patient positioning. Overlying  artifacts. Stable left chest wall cardiac pacer device. The lungs are  clear of consolidation. No pneumothorax. Unchanged cardiomediastinal  contours. Partially visualized thoracic spondylosis and degenerative  changes in both shoulders. Multiple old right rib fracture deformities.  No acute osseous abnormality is identified.        Impression:      Mildly limited study demonstrating no active disease.     Electronically Signed By-Shahriar Garcia On:10/2/2020 11:07 PM  This report was finalized on [id number] by  Shahriar Garcia, .    CT Lower Extremity Right Without Contrast [[id number]] Collected: 10/02/20 2105     Updated: 10/02/20 2122    Narrative:         DATE OF EXAM:   10/2/2020 5:24 PM     PROCEDURE:   CT LOWER EXTREMITY RIGHT WO CONTRAST-     INDICATIONS:   Foot swelling, diabetic, osteomyelitis suspected, no prior imaging      COMPARISON:  Radiographs 10-20 20. CT abdomen pelvis 08/04/2020.     TECHNIQUE:   CT of the right ankle and foot was obtained without the administration  of contrast. Coronal and sagittal reformats were obtained. Automated  exposure control and alternative reconstruction methods were used.     FINDINGS:   Motion artifact mildly limits evaluation.     No evidence of acute fracture or dislocation. Partially visualized wound  lateral to the fifth metatarsal head with underlying soft tissue  thickening. No underlying loculated fluid collection to suggest abscess.  Mild diffuse soft tissue edema. No lytic or sclerotic changes are seen  to suggest osteomyelitis.     Well-corticated 6 mm ossification along the anterior distal lateral  malleolus, likely sequela of remote trauma. There is also a 12 mm  ossification lateral to the peroneal tubercle could reflect sequela of  remote trauma or developmental variant.     Small posterior and plantar calcaneal enthesophytes. Mild thickening of  the Achilles tendon. Focal calcification of the thickened proximal  plantar fascia.     Moderate to advanced multifocal arthropathic changes at the midfoot with  likely degenerative cystic changes in the navicular, middle and lateral  cuneiforms, the cuboid, and the bases of the third, fourth, and fifth  metatarsals. Mild DJD at the great toe MTP joint.     Mild thickening of the distal peroneus longus tendon, suggesting  tendinopathy. No cystic or solid soft tissue mass. The sinus tarsi is  unremarkable. The intermetatarsal spaces are unremarkable.        Impression:         1. Partially visualized wound lateral to the fifth metatarsal head with  underlying soft tissue thickening and diffuse soft tissue edema. No  loculated fluid collection is seen to suggest abscess.  2. Motion degraded study demonstrating no CT evidence of osteomyelitis.  3. Moderate to advanced multifocal arthropathic changes at the midfoot,  likely developing Charcot  arthropathy.  4. Thickening of the distal peroneus longus tendon, statistically  tendinopathy, with a chronic ossification lateral to the peroneal  tubercle.  5. Mild Achilles tendinopathy and chronic plantar fasciitis.     Electronically Signed By-Shahriar Garcia On:10/2/2020 9:19 PM  This report was finalized on [id number] by  Shahriar Garcia, .    XR Ankle 3+ View Right [[id number]] Collected: 10/02/20 1230     Updated: 10/02/20 1234    Narrative:      DATE OF EXAM:  10/2/2020 12:07 PM     PROCEDURE:  XR ANKLE 3+ VW RIGHT-     INDICATIONS:  open wound right foot, 5th metatarsal head. Assess for soft tissue  emphysema.     COMPARISON:  Right foot radiographs 11/02/2020     TECHNIQUE:   3 views of the right ankle(s) was/were obtained.     FINDINGS:  No right ankle fracture or joint dislocation is seen. No evidence of  osteomyelitis in the right ankle. No retained radiopaque foreign body.  No subcutaneous gas. Small enthesophytes at the Achilles and plantar  tendon insertions upon the posterior calcaneus.        Impression:         1. Normal right ankle. No acute osseous abnormality or evidence of  osteomyelitis of the right ankle.  2. No  subcutaneous gas is seen.     Electronically Signed By-Dr. Ekaterina Goins MD On:10/2/2020 12:32 PM  This report was finalized on [id number] by Dr. Ekaterina Goins MD.    XR Foot 3+ View Right [[id number]] Collected: 10/02/20 1231     Updated: 10/02/20 1234    Narrative:      DATE OF EXAM:  10/2/2020 12:08 PM     PROCEDURE:  XR FOOT 3+ VW RIGHT-     INDICATIONS:  open wound right foot, 5th metatarsal head     COMPARISON:  Foot radiograph 8/16/2018     TECHNIQUE:   A minimum of three routine standard radiographic views were obtained of  the right foot.     FINDINGS:  There is bandage material along the foot which limits fine osseous  detail. There is soft tissue gas focus at the head of the fifth  metatarsal. There is indistinct margins of the cortex at the lateral  aspect head of the  fifth metatarsal. This is consistent with  osteomyelitis given the clinical history. Soft tissue infection is also  likely given the presence of small amount of soft tissue gas. There are  hammertoe deformities with abnormal alignment of the second third fourth  metatarsal phalange joints. There is abnormal alignment at the first  metatarsal phalange joint. There is mild diffuse soft tissue swelling.  There is osteopenia. There is mild arthritis of the tarsometatarsal  joints and talonavicular joint. There is a small plantar calcaneal spur.  There is a small enthesophyte Achilles tendon insertion site.        Impression:      Findings consistent with ostomy myelitis with osseous destruction along  lateral margin of the fifth metatarsal head. Small foci of soft tissue  gas suggest soft tissue infection at this location as well.     Electronically Signed By-Antoinette August MD On:10/2/2020 12:32 PM  This report was finalized on [id number] by  Antoinette August MD.        ECG/EMG Results (most recent)     Procedure Component Value Units Date/Time    ECG 12 Lead [[id number]] Collected: 10/03/20 1651     Updated: 10/03/20 1655    Narrative:      HEART RATE= 75  bpm  RR Interval= 800  ms  CO Interval=   ms  P Horizontal Axis=   deg  P Front Axis=   deg  QRSD Interval= 173  ms  QT Interval= 425  ms  QRS Axis= 268  deg  T Wave Axis= 46  deg  - ABNORMAL ECG -  Afib/flutter and ventricular-paced rhythm  When compared with ECG of 04-Aug-2020 10:09:27,  Significant change in rhythm  Electronically Signed By:   Date and Time of Study: 2020-10-03 16:51:59        CBC    Results from last 7 days   Lab Units 10/04/20  0420 10/03/20  0247 10/02/20  2355 10/02/20  1401   WBC 10*3/mm3 7.50 7.20 7.30 6.80   HEMOGLOBIN g/dL 12.7* 12.3* 12.4* 12.2*   PLATELETS 10*3/mm3 330 315 307 323     BMP   Results from last 7 days   Lab Units 10/04/20  0420 10/03/20  0247 10/02/20  2355 10/02/20  1401   SODIUM mmol/L 136 137 136 139   POTASSIUM mmol/L 4.5  4.5 4.2 4.3   CHLORIDE mmol/L 103 103 101 102   CO2 mmol/L 25.0 26.0 26.0 26.0   BUN  18 22 24* 26*   CREATININE mg/dL 1.28* 1.26 1.20 1.15   GLUCOSE mg/dL 171* 149* 163* 127*     CMP   Results from last 7 days   Lab Units 10/04/20  0420 10/03/20  0247 10/02/20  2355 10/02/20  1401   SODIUM mmol/L 136 137 136 139   POTASSIUM mmol/L 4.5 4.5 4.2 4.3   CHLORIDE mmol/L 103 103 101 102   CO2 mmol/L 25.0 26.0 26.0 26.0   BUN  18 22 24* 26*   CREATININE mg/dL 1.28* 1.26 1.20 1.15   GLUCOSE mg/dL 171* 149* 163* 127*   ALBUMIN g/dL 3.50  --   --   --    BILIRUBIN mg/dL 1.0  --   --   --    ALK PHOS U/L 195*  --   --   --    AST (SGOT) U/L 41*  --   --   --    ALT (SGPT) U/L 14  --   --   --      Cardiac Studies:  Echo-   Results for orders placed during the hospital encounter of 12/03/19   Adult Transthoracic Echo Complete W/ Cont if Necessary Per Protocol    Narrative · Mild mitral valve regurgitation is present  · Mild dilation of the sinuses of Valsalva is present.  · Left ventricular systolic function is hyperdynamic (EF > 70).  · Left atrial cavity size is severely dilated.  · Right ventricular cavity is mildly dilated.  · Left ventricular diastolic dysfunction (grade II) consistent with   pseudonormalization.     Overall normal LV systolic function and size  Normal RV function, mild RV enlargement  Severe left atrial enlargement  Diastolic dysfunction indeterminate by criteria  No masses  No effusion seen  EF hyperdynamic greater than 70%  No significant valvulopathy seen       Stress Myoview-  Cath-      Medication Review:   Scheduled Meds:aspirin, 81 mg, Oral, Daily  atorvastatin, 40 mg, Oral, Nightly  cefepime, 2 g, Intravenous, Q8H  digoxin, 250 mcg, Oral, Daily  dilTIAZem CD, 120 mg, Oral, Daily  enoxaparin, 40 mg, Subcutaneous, Q24H  ipratropium-albuterol, 3 mL, Nebulization, 4x Daily - RT  losartan, 100 mg, Oral, Daily  metoprolol tartrate, 37.5 mg, Oral, Q12H  Pharmacy to dose vancomycin, , Does not apply,  Q12H  potassium chloride, 10 mEq, Oral, BID  pregabalin, 150 mg, Oral, BID  sodium chloride, 10 mL, Intravenous, Q12H  vancomycin, 1,500 mg, Intravenous, Q12H      Continuous Infusions:   PRN Meds:.HYDROmorphone  •  oxyCODONE      Assessment/Plan   Patient Active Problem List   Diagnosis   • Skin ulcer (CMS/HCC)   • Chronic low back pain   • DDD (degenerative disc disease), lumbar   • Spondylosis of lumbosacral region   • Atrial fibrillation (CMS/HCC)   • Chronic obstructive pulmonary disease (CMS/HCC)   • Coronary artery disease   • Gastroesophageal reflux disease   • Hyperlipidemia   • Essential hypertension   • Lumbar radiculopathy   • Other hammer toe(s) (acquired), right foot   • Presence of cardiac pacemaker   • Status post coronary artery stent placement   • Lumbar facet joint pain   • COPD exacerbation (CMS/HCC)   • COPD (chronic obstructive pulmonary disease) (CMS/HCC)   • Unstable angina (CMS/HCC)   • Stented coronary artery   • Small bowel obstruction due to adhesions (CMS/HCC)   • Elevated lipoprotein(a)   • Generalized abdominal pain   • Small bowel obstruction (CMS/HCC)   • SBO (small bowel obstruction) (CMS/HCC)   • Osteomyelitis (CMS/HCC)     Plan:    Patient underwent bone scan today.  Patient is clinically doing well.  Patient is cleared for the surgery tomorrow.  Primary cardiologist Dr. Wilkins to see the patient in the morning    Tho Gan MD  10/04/20  11:00 EDT

## 2020-10-04 NOTE — PLAN OF CARE
Patient medicated as needed for pain. Continuing IV antibiotics. Plans on Incision and drain with debridement tomorrow. Bone scan completed today. Awaiting results. Continue current plan of care. Will monitor.   Problem: Adult Inpatient Plan of Care  Goal: Plan of Care Review  Outcome: Ongoing, Not Progressing  Goal: Patient-Specific Goal (Individualized)  Outcome: Ongoing, Not Progressing  Goal: Absence of Hospital-Acquired Illness or Injury  Outcome: Ongoing, Not Progressing  Intervention: Identify and Manage Fall Risk  Recent Flowsheet Documentation  Taken 10/4/2020 1300 by Yelena Almonte RN  Safety Promotion/Fall Prevention: patient off unit  Taken 10/4/2020 0910 by Yelena Almonte RN  Safety Promotion/Fall Prevention:   toileting scheduled   room organization consistent   safety round/check completed   patient off unit   nonskid shoes/slippers when out of bed  Taken 10/4/2020 0714 by Yelena Almonte RN  Safety Promotion/Fall Prevention:   assistive device/personal items within reach   clutter free environment maintained   fall prevention program maintained   nonskid shoes/slippers when out of bed   safety round/check completed   room organization consistent  Intervention: Prevent Infection  Recent Flowsheet Documentation  Taken 10/4/2020 1110 by Yelena Almonte RN  Infection Prevention:   personal protective equipment utilized   environmental surveillance performed   equipment surfaces disinfected   hand hygiene promoted   rest/sleep promoted   single patient room provided   visitors restricted/screened  Taken 10/4/2020 0910 by Yelena Almonte RN  Infection Prevention: personal protective equipment utilized  Taken 10/4/2020 0714 by Yelena Almonte RN  Infection Prevention: personal protective equipment utilized  Goal: Optimal Comfort and Wellbeing  Outcome: Ongoing, Not Progressing  Goal: Readiness for Transition of Care  Outcome: Ongoing, Not Progressing   Goal Outcome Evaluation:  Plan of Care  Reviewed With: patient

## 2020-10-04 NOTE — PROGRESS NOTES
LOS: 2 days   Patient Care Team:  Yusuf Jones MD as PCP - General  PhiJoshua MD as Consulting Physician (Cardiology)  Deam, Moise Kathleen MD as Consulting Physician (Cardiac Electrophysiology)    Chief Complaint: Back pain and foot pain well controlled.  Patient tells me that he is going to the OR tomorrow with podiatry for surgical debridement.  Infectious disease has also ordered bone scan.  Patient so far says he is very pleased with his treatment.  He is agreeable to the planned surgical debridement tomorrow.  I also spoke personally with podiatry today about the plans for this pleasant gentleman surgical debridement.    Subjective     Interval History:     Patient Complaints: Still with some chronic back pain from the hospital beds but overall he says he is feeling much better.  Foot pain well controlled  Patient Denies: No shortness of breath or cough, no fevers or chills.  No abdominal pain and tolerating diet.  History taken from: patient    Review of Systems:    All systems were reviewed and negative except for: See interval history    Objective     Vital Signs  Temp:  [97.7 °F (36.5 °C)-98.4 °F (36.9 °C)] 97.7 °F (36.5 °C)  Heart Rate:  [70-78] 72  Resp:  [14-18] 18  BP: (150-165)/(77-95) 157/95    Physical Exam:     General Appearance:    Alert, cooperative, in no acute distress, pleasant but chronically ill-appearing   Head:    Normocephalic, without obvious abnormality, atraumatic   Eyes:            Conjunctivae and sclerae normal, no   icterus, no pallor, corneas clear, PERRLA   Throat:   No oral lesions, no thrush, oral mucosa moist   Neck:   No adenopathy, supple, trachea midline, no thyromegaly, no   carotid bruit, no JVD   Lungs:     Clear to auscultation,respirations regular, even and                  unlabored    Heart:    Regular rhythm and normal rate, normal S1 and S2, no            murmur, no gallop, no rub, no click   Chest Wall:    No abnormalities observed      Abdomen:     Normal bowel sounds, no masses, no organomegaly, soft        non-tender, non-distended, no guarding, no rebound                Tenderness, massively obese   Rectal:     Deferred   Extremities:   Moves all extremities well, no edema, no cyanosis, no             redness, chronic venous stasis changes.  Capillary refill on the right around 3 seconds.  Surgical dressings clean and dry   Pulses:   Pulses palpable and equal bilaterally   Skin:   No bleeding, bruising or rash   Lymph nodes:   No palpable adenopathy   Neurologic:   Cranial nerves 2 - 12 grossly intact, sensation intact, DTR       present and equal bilaterally   Radiology:  Xr Ankle 3+ View Right    Result Date: 10/2/2020   1. Normal right ankle. No acute osseous abnormality or evidence of osteomyelitis of the right ankle. 2. No  subcutaneous gas is seen.  Electronically Signed By-Dr. Ekaterina oGins MD On:10/2/2020 12:32 PM This report was finalized on [id number] by Dr. Ekaterina Goins MD.    Xr Foot 3+ View Right    Result Date: 10/2/2020  Findings consistent with ostomy myelitis with osseous destruction along lateral margin of the fifth metatarsal head. Small foci of soft tissue gas suggest soft tissue infection at this location as well.  Electronically Signed By-Antoinette August MD On:10/2/2020 12:32 PM This report was finalized on [id number] by  Antoinette August MD.    Xr Chest 1 View    Result Date: 10/2/2020  Mildly limited study demonstrating no active disease.  Electronically Signed By-Shahriar Garcia On:10/2/2020 11:07 PM This report was finalized on [id number] by  Shahriar Garcia, .    Ct Lower Extremity Right Without Contrast    Result Date: 10/2/2020   1. Partially visualized wound lateral to the fifth metatarsal head with underlying soft tissue thickening and diffuse soft tissue edema. No loculated fluid collection is seen to suggest abscess. 2. Motion degraded study demonstrating no CT evidence of osteomyelitis. 3. Moderate to advanced  multifocal arthropathic changes at the midfoot, likely developing Charcot arthropathy. 4. Thickening of the distal peroneus longus tendon, statistically tendinopathy, with a chronic ossification lateral to the peroneal tubercle. 5. Mild Achilles tendinopathy and chronic plantar fasciitis.  Electronically Signed By-Shahriar Garcia On:10/2/2020 9:19 PM This report was finalized on [id number] by  Shahriar Garcia, .         Results Review:     I reviewed the patient's new clinical results.  I reviewed the patient's new imaging results and agree with the interpretation.    Medication Review:   Scheduled Meds:aspirin, 81 mg, Oral, Daily  atorvastatin, 40 mg, Oral, Nightly  cefepime, 2 g, Intravenous, Q8H  digoxin, 250 mcg, Oral, Daily  dilTIAZem CD, 120 mg, Oral, Daily  enoxaparin, 40 mg, Subcutaneous, Q24H  ipratropium-albuterol, 3 mL, Nebulization, 4x Daily - RT  losartan, 100 mg, Oral, Daily  metoprolol tartrate, 37.5 mg, Oral, Q12H  Pharmacy to dose vancomycin, , Does not apply, Q12H  potassium chloride, 10 mEq, Oral, BID  pregabalin, 150 mg, Oral, BID  sodium chloride, 10 mL, Intravenous, Q12H  vancomycin, 1,500 mg, Intravenous, Q12H      Continuous Infusions:   PRN Meds:.HYDROmorphone  •  oxyCODONE    Labs:  Lab Results (last 24 hours)     Procedure Component Value Units Date/Time    POC Glucose Once [[id number]]  (Abnormal) Collected: 10/04/20 0758    Specimen: Blood Updated: 10/04/20 0759     Glucose 227 mg/dL      Comment: Serial Number: [id number]:  [id number]       Wound Culture - Wound, Foot, Right [[id number]]  (Abnormal) Collected: 10/02/20 1329    Specimen: Wound from Foot, Right Updated: 10/04/20 0705     Wound Culture Scant growth (1+) Gram Negative Bacilli      Scant growth (1+) Normal Skin Janeen     Gram Stain Less than 10 WBCs seen      Rare (1+) Gram positive cocci      Rare (1+) Gram negative bacilli    BUN [[id number]]  (Normal) Collected: 10/04/20 0420    Specimen: Blood Updated: 10/04/20 0603      BUN 18 mg/dL     Vancomycin, Trough [[id number]]  (Normal) Collected: 10/04/20 0420    Specimen: Blood Updated: 10/04/20 0513     Vancomycin Trough 18.30 mcg/mL     Comprehensive Metabolic Panel [[id number]]  (Abnormal) Collected: 10/04/20 0420    Specimen: Blood Updated: 10/04/20 0513     Glucose 171 mg/dL      BUN --     Comment: Testing performed by alternate method        Creatinine 1.28 mg/dL      Sodium 136 mmol/L      Potassium 4.5 mmol/L      Chloride 103 mmol/L      CO2 25.0 mmol/L      Calcium 8.7 mg/dL      Total Protein 6.5 g/dL      Albumin 3.50 g/dL      ALT (SGPT) 14 U/L      AST (SGOT) 41 U/L      Alkaline Phosphatase 195 U/L      Total Bilirubin 1.0 mg/dL      eGFR Non African Amer 55 mL/min/1.73      Globulin 3.0 gm/dL      A/G Ratio 1.2 g/dL      BUN/Creatinine Ratio --     Comment: Testing not performed        Anion Gap 8.0 mmol/L     Narrative:      GFR Normal >60  Chronic Kidney Disease <60  Kidney Failure <15      CBC & Differential [[id number]]  (Abnormal) Collected: 10/04/20 0420    Specimen: Blood Updated: 10/04/20 0437    Narrative:      The following orders were created for panel order CBC & Differential.  Procedure                               Abnormality         Status                     ---------                               -----------         ------                     CBC Auto Differential[[id number]]        Abnormal            Final result                 Please view results for these tests on the individual orders.    CBC Auto Differential [[id number]]  (Abnormal) Collected: 10/04/20 0420    Specimen: Blood Updated: 10/04/20 0437     WBC 7.50 10*3/mm3      RBC 4.14 10*6/mm3      Hemoglobin 12.7 g/dL      Hematocrit 37.0 %      MCV 89.4 fL      MCH 30.8 pg      MCHC 34.4 g/dL      RDW 15.4 %      RDW-SD 48.1 fl      MPV 7.9 fL      Platelets 330 10*3/mm3      Neutrophil % 65.8 %      Lymphocyte % 16.1 %      Monocyte % 6.9 %      Eosinophil % 8.6 %      Basophil % 2.6 %       Neutrophils, Absolute 4.90 10*3/mm3      Lymphocytes, Absolute 1.20 10*3/mm3      Monocytes, Absolute 0.50 10*3/mm3      Eosinophils, Absolute 0.60 10*3/mm3      Basophils, Absolute 0.20 10*3/mm3      nRBC 0.1 /100 WBC     Blood Culture - Blood, Arm, Left [[id number]] Collected: 10/02/20 2353    Specimen: Blood from Arm, Left Updated: 10/04/20 0015     Blood Culture No growth at 24 hours    Blood Culture - Blood, Arm, Right [[id number]] Collected: 10/02/20 2355    Specimen: Blood from Arm, Right Updated: 10/04/20 0015     Blood Culture No growth at 24 hours    Vancomycin, Peak [[id number]]  (Normal) Collected: 10/03/20 2054    Specimen: Blood Updated: 10/03/20 2124     Vancomycin Peak 27.90 mcg/mL     POC Glucose Once [[id number]]  (Abnormal) Collected: 10/03/20 1910    Specimen: Blood Updated: 10/03/20 1911     Glucose 189 mg/dL      Comment: Serial Number: [id number]:  [id number]       POC Glucose Once [[id number]]  (Abnormal) Collected: 10/03/20 1632    Specimen: Blood Updated: 10/03/20 1633     Glucose 217 mg/dL      Comment: Serial Number: [id number]:  [id number]       POC Glucose Once [[id number]]  (Abnormal) Collected: 10/03/20 1236    Specimen: Blood Updated: 10/03/20 1237     Glucose 204 mg/dL      Comment: Serial Number: [id number]:  [id number]              Assessment/Plan       Osteomyelitis (CMS/[facility])  Type 2 diabetes with peripheral neuropathy  Diabetic foot ulcer with exposed bone right foot  Cellulitis of the right foot  Atrial fibrillation/status post pacemaker placement  COPD  History intermittent small bowel bowel obstructions, not a surgical candidate  Opioid dependence  Acute on chronic back pain  Essential hypertension    Continue empiric parenteral antibiotics, pain control.  Once again I spoke personally with podiatry patient will be taken to the OR tomorrow for surgical debridement and bone biopsy to be sent.  Monitor electrolytes and replace PRN.        Jovana Dupont,  DO  10/04/20  09:44 EDT

## 2020-10-04 NOTE — PROGRESS NOTES
Infectious Diseases Progress Note      LOS: 2 days   Patient Care Team:  Yusuf Jones MD as PCP - General  PhiJoshua MD as Consulting Physician (Cardiology)  Shirley, Moise Kathleen MD as Consulting Physician (Cardiac Electrophysiology)    Chief Complaint: Right foot wound    Subjective       The patient remained afebrile.  The patient remained hemodynamically stable.  He stating his current antibiotics with no side effects.  He is on room air  .  Review of Systems:   Review of Systems   Constitutional: Negative.    HENT: Negative.    Eyes: Negative.    Respiratory: Negative.    Cardiovascular: Negative.    Gastrointestinal: Negative.    Genitourinary: Negative.    Musculoskeletal: Negative.    Skin: Positive for wound.   Neurological: Negative.    Hematological: Negative.    Psychiatric/Behavioral: Negative.         Objective     Vital Signs  Temp:  [97.7 °F (36.5 °C)-98.6 °F (37 °C)] 98.6 °F (37 °C)  Heart Rate:  [70-78] 70  Resp:  [15-20] 20  BP: (150-159)/(77-95) 159/79    Physical Exam:  Physical Exam  Vitals signs and nursing note reviewed.   Constitutional:       Appearance: He is well-developed.   HENT:      Head: Normocephalic and atraumatic.   Eyes:      Pupils: Pupils are equal, round, and reactive to light.   Neck:      Musculoskeletal: Normal range of motion and neck supple.   Cardiovascular:      Rate and Rhythm: Normal rate and regular rhythm.      Heart sounds: Normal heart sounds.   Pulmonary:      Effort: Pulmonary effort is normal. No respiratory distress.      Breath sounds: Normal breath sounds. No wheezing or rales.   Abdominal:      General: Bowel sounds are normal. There is no distension.      Palpations: Abdomen is soft. There is no mass.      Tenderness: There is no abdominal tenderness. There is no guarding or rebound.   Musculoskeletal: Normal range of motion.         General: No deformity.      Comments: Wound present on the lateral aspect of the right foot at the fifth  MTP joint site   Skin:     General: Skin is warm.      Findings: No erythema or rash.   Neurological:      Mental Status: He is alert and oriented to person, place, and time.      Cranial Nerves: No cranial nerve deficit.          Results Review:    I have reviewed all clinical data, test, lab, and imaging results.     Radiology  No Radiology Exams Resulted Within Past 24 Hours    Cardiology    Laboratory    Results from last 7 days   Lab Units 10/04/20  0420 10/03/20  0247 10/02/20  2355 10/02/20  1401   WBC 10*3/mm3 7.50 7.20 7.30 6.80   HEMOGLOBIN g/dL 12.7* 12.3* 12.4* 12.2*   HEMATOCRIT % 37.0* 35.9* 35.9* 35.3*   PLATELETS 10*3/mm3 330 315 307 323     Results from last 7 days   Lab Units 10/04/20  0420 10/03/20  0247 10/02/20  2355 10/02/20  1401   SODIUM mmol/L 136 137 136 139   POTASSIUM mmol/L 4.5 4.5 4.2 4.3   CHLORIDE mmol/L 103 103 101 102   CO2 mmol/L 25.0 26.0 26.0 26.0   BUN  18 22 24* 26*   CREATININE mg/dL 1.28* 1.26 1.20 1.15   GLUCOSE mg/dL 171* 149* 163* 127*   ALBUMIN g/dL 3.50  --   --   --    BILIRUBIN mg/dL 1.0  --   --   --    ALK PHOS U/L 195*  --   --   --    AST (SGOT) U/L 41*  --   --   --    ALT (SGPT) U/L 14  --   --   --    CALCIUM mg/dL 8.7 8.6 8.7 8.9         Results from last 7 days   Lab Units 10/03/20  0247   SED RATE mm/hr 35*         Microbiology   Microbiology Results (last 10 days)     Procedure Component Value - Date/Time    Blood Culture - Blood, Arm, Right [[id number]] Collected: 10/02/20 2355    Lab Status: Preliminary result Specimen: Blood from Arm, Right Updated: 10/04/20 0015     Blood Culture No growth at 24 hours    Blood Culture - Blood, Arm, Left [[id number]] Collected: 10/02/20 2353    Lab Status: Preliminary result Specimen: Blood from Arm, Left Updated: 10/04/20 0015     Blood Culture No growth at 24 hours    Wound Culture - Wound, Foot, Right [[id number]]  (Abnormal) Collected: 10/02/20 1329    Lab Status: Preliminary result Specimen: Wound from Foot, Right  Updated: 10/04/20 0705     Wound Culture Scant growth (1+) Gram Negative Bacilli      Scant growth (1+) Normal Skin Janeen     Gram Stain Less than 10 WBCs seen      Rare (1+) Gram positive cocci      Rare (1+) Gram negative bacilli          Medication Review:       Schedule Meds  [START ON 10/6/2020] !Vancomycin Level Draw Needed, , Does not apply, Once  aspirin, 81 mg, Oral, Daily  atorvastatin, 40 mg, Oral, Nightly  cefepime, 2 g, Intravenous, Q8H  digoxin, 250 mcg, Oral, Daily  dilTIAZem CD, 120 mg, Oral, Daily  enoxaparin, 40 mg, Subcutaneous, Q24H  ipratropium-albuterol, 3 mL, Nebulization, 4x Daily - RT  losartan, 100 mg, Oral, Daily  metoprolol tartrate, 37.5 mg, Oral, Q12H  Pharmacy to dose vancomycin, , Does not apply, Q12H  potassium chloride, 10 mEq, Oral, BID  pregabalin, 150 mg, Oral, BID  sodium chloride, 10 mL, Intravenous, Q12H  vancomycin, 1,500 mg, Intravenous, Q12H        Infusion Meds       PRN Meds  HYDROmorphone  •  oxyCODONE        Assessment/Plan       Antimicrobial Therapy   1.  IV cefepime      day  2.  IV vancomycin      day  3.      Day  4.      Day  5.      Day        Assessment    Right diabetic foot wound.  Initial plain x-ray was suspicious for osteomyelitis however CT scan was negative.  Unable to obtain MRI since patient had a pacemaker placement in the past  Wound culture is growing gram-negative     Patient states that he was just recently told he had diabetes but has not started treatment    CAD stent placement, CHF, A. Fib    Pacemaker placement    History of prostate cancer    Plan    Continue IV vancomycin   Continue IV cefepime 2 g every 8 hours  Waiting on wound cultures to finalize  Request three-phase bone scan of the right foot-report pending  Podiatry has plans for I&D of right foot with bone biopsy tomorrow  Continue supportive care  Sanju Green, APRN  10/04/20  13:38 EDT      Note is dictated utilizing voice recognition software/Dragon

## 2020-10-05 ENCOUNTER — ANESTHESIA (OUTPATIENT)
Dept: PERIOP | Facility: HOSPITAL | Age: 75
End: 2020-10-05

## 2020-10-05 ENCOUNTER — ANESTHESIA EVENT (OUTPATIENT)
Dept: PERIOP | Facility: HOSPITAL | Age: 75
End: 2020-10-05

## 2020-10-05 ENCOUNTER — APPOINTMENT (OUTPATIENT)
Dept: GENERAL RADIOLOGY | Facility: HOSPITAL | Age: 75
End: 2020-10-05

## 2020-10-05 PROBLEM — E11.621 DIABETIC FOOT ULCER: Status: ACTIVE | Noted: 2020-10-02

## 2020-10-05 PROBLEM — L97.509 DIABETIC FOOT ULCER: Status: ACTIVE | Noted: 2020-10-02

## 2020-10-05 LAB
ANION GAP SERPL CALCULATED.3IONS-SCNC: 7 MMOL/L (ref 5–15)
BACTERIA SPEC AEROBE CULT: ABNORMAL
BASOPHILS # BLD AUTO: 0.1 10*3/MM3 (ref 0–0.2)
BASOPHILS NFR BLD AUTO: 1.4 % (ref 0–1.5)
BUN SERPL-MCNC: 20 MG/DL (ref 8–23)
BUN SERPL-MCNC: ABNORMAL MG/DL
BUN/CREAT SERPL: ABNORMAL
CALCIUM SPEC-SCNC: 8.9 MG/DL (ref 8.6–10.5)
CHLORIDE SERPL-SCNC: 101 MMOL/L (ref 98–107)
CO2 SERPL-SCNC: 26 MMOL/L (ref 22–29)
CREAT SERPL-MCNC: 1.12 MG/DL (ref 0.76–1.27)
DEPRECATED RDW RBC AUTO: 48.6 FL (ref 37–54)
EOSINOPHIL # BLD AUTO: 0.8 10*3/MM3 (ref 0–0.4)
EOSINOPHIL NFR BLD AUTO: 9.8 % (ref 0.3–6.2)
ERYTHROCYTE [DISTWIDTH] IN BLOOD BY AUTOMATED COUNT: 15.5 % (ref 12.3–15.4)
GFR SERPL CREATININE-BSD FRML MDRD: 64 ML/MIN/1.73
GLUCOSE BLDC GLUCOMTR-MCNC: 109 MG/DL (ref 70–105)
GLUCOSE BLDC GLUCOMTR-MCNC: 127 MG/DL (ref 70–105)
GLUCOSE BLDC GLUCOMTR-MCNC: 128 MG/DL (ref 70–105)
GLUCOSE BLDC GLUCOMTR-MCNC: 172 MG/DL (ref 70–105)
GLUCOSE SERPL-MCNC: 140 MG/DL (ref 65–99)
GRAM STN SPEC: ABNORMAL
HCT VFR BLD AUTO: 36.8 % (ref 37.5–51)
HGB BLD-MCNC: 12.4 G/DL (ref 13–17.7)
LYMPHOCYTES # BLD AUTO: 1.1 10*3/MM3 (ref 0.7–3.1)
LYMPHOCYTES NFR BLD AUTO: 13.8 % (ref 19.6–45.3)
MCH RBC QN AUTO: 30.6 PG (ref 26.6–33)
MCHC RBC AUTO-ENTMCNC: 33.6 G/DL (ref 31.5–35.7)
MCV RBC AUTO: 91 FL (ref 79–97)
MONOCYTES # BLD AUTO: 0.7 10*3/MM3 (ref 0.1–0.9)
MONOCYTES NFR BLD AUTO: 8.7 % (ref 5–12)
NEUTROPHILS NFR BLD AUTO: 5.4 10*3/MM3 (ref 1.7–7)
NEUTROPHILS NFR BLD AUTO: 66.3 % (ref 42.7–76)
NRBC BLD AUTO-RTO: 0.1 /100 WBC (ref 0–0.2)
PLATELET # BLD AUTO: 299 10*3/MM3 (ref 140–450)
PMV BLD AUTO: 8.2 FL (ref 6–12)
POTASSIUM SERPL-SCNC: 4.8 MMOL/L (ref 3.5–5.2)
RBC # BLD AUTO: 4.05 10*6/MM3 (ref 4.14–5.8)
SODIUM SERPL-SCNC: 134 MMOL/L (ref 136–145)
WBC # BLD AUTO: 8.1 10*3/MM3 (ref 3.4–10.8)

## 2020-10-05 PROCEDURE — 88311 DECALCIFY TISSUE: CPT | Performed by: PODIATRIST

## 2020-10-05 PROCEDURE — 25010000002 HYDROMORPHONE PER 4 MG: Performed by: FAMILY MEDICINE

## 2020-10-05 PROCEDURE — 80048 BASIC METABOLIC PNL TOTAL CA: CPT | Performed by: FAMILY MEDICINE

## 2020-10-05 PROCEDURE — 25010000002 HYDROMORPHONE PER 4 MG: Performed by: PODIATRIST

## 2020-10-05 PROCEDURE — 94799 UNLISTED PULMONARY SVC/PX: CPT

## 2020-10-05 PROCEDURE — 87205 SMEAR GRAM STAIN: CPT | Performed by: PODIATRIST

## 2020-10-05 PROCEDURE — 73620 X-RAY EXAM OF FOOT: CPT

## 2020-10-05 PROCEDURE — 87116 MYCOBACTERIA CULTURE: CPT | Performed by: PODIATRIST

## 2020-10-05 PROCEDURE — 99233 SBSQ HOSP IP/OBS HIGH 50: CPT | Performed by: INTERNAL MEDICINE

## 2020-10-05 PROCEDURE — 82962 GLUCOSE BLOOD TEST: CPT

## 2020-10-05 PROCEDURE — 85025 COMPLETE CBC W/AUTO DIFF WBC: CPT | Performed by: FAMILY MEDICINE

## 2020-10-05 PROCEDURE — 87176 TISSUE HOMOGENIZATION CULTR: CPT | Performed by: PODIATRIST

## 2020-10-05 PROCEDURE — 25010000002 PROPOFOL 10 MG/ML EMULSION: Performed by: NURSE ANESTHETIST, CERTIFIED REGISTERED

## 2020-10-05 PROCEDURE — 87102 FUNGUS ISOLATION CULTURE: CPT | Performed by: PODIATRIST

## 2020-10-05 PROCEDURE — 87075 CULTR BACTERIA EXCEPT BLOOD: CPT | Performed by: PODIATRIST

## 2020-10-05 PROCEDURE — 87070 CULTURE OTHR SPECIMN AEROBIC: CPT | Performed by: PODIATRIST

## 2020-10-05 PROCEDURE — 0JBQ0ZZ EXCISION OF RIGHT FOOT SUBCUTANEOUS TISSUE AND FASCIA, OPEN APPROACH: ICD-10-PCS | Performed by: PODIATRIST

## 2020-10-05 PROCEDURE — 25010000003 LIDOCAINE 1 % SOLUTION: Performed by: PODIATRIST

## 2020-10-05 PROCEDURE — 25010000002 FENTANYL CITRATE (PF) 100 MCG/2ML SOLUTION: Performed by: NURSE ANESTHETIST, CERTIFIED REGISTERED

## 2020-10-05 PROCEDURE — 88307 TISSUE EXAM BY PATHOLOGIST: CPT | Performed by: PODIATRIST

## 2020-10-05 PROCEDURE — 76000 FLUOROSCOPY <1 HR PHYS/QHP: CPT

## 2020-10-05 PROCEDURE — 25010000002 CEFEPIME PER 500 MG: Performed by: FAMILY MEDICINE

## 2020-10-05 PROCEDURE — 25010000002 CEFEPIME PER 500 MG: Performed by: PODIATRIST

## 2020-10-05 PROCEDURE — 87206 SMEAR FLUORESCENT/ACID STAI: CPT | Performed by: PODIATRIST

## 2020-10-05 PROCEDURE — 25010000002 VANCOMYCIN 10 G RECONSTITUTED SOLUTION: Performed by: FAMILY MEDICINE

## 2020-10-05 PROCEDURE — 25010000002 MIDAZOLAM PER 1 MG: Performed by: NURSE ANESTHETIST, CERTIFIED REGISTERED

## 2020-10-05 RX ORDER — ACETAMINOPHEN 650 MG/1
650 SUPPOSITORY RECTAL ONCE AS NEEDED
Status: DISCONTINUED | OUTPATIENT
Start: 2020-10-05 | End: 2020-10-05 | Stop reason: HOSPADM

## 2020-10-05 RX ORDER — LIDOCAINE HYDROCHLORIDE 10 MG/ML
INJECTION, SOLUTION INFILTRATION; PERINEURAL AS NEEDED
Status: DISCONTINUED | OUTPATIENT
Start: 2020-10-05 | End: 2020-10-05 | Stop reason: HOSPADM

## 2020-10-05 RX ORDER — ACETAMINOPHEN 325 MG/1
650 TABLET ORAL ONCE AS NEEDED
Status: DISCONTINUED | OUTPATIENT
Start: 2020-10-05 | End: 2020-10-05 | Stop reason: HOSPADM

## 2020-10-05 RX ORDER — FENTANYL CITRATE 50 UG/ML
25 INJECTION, SOLUTION INTRAMUSCULAR; INTRAVENOUS
Status: DISCONTINUED | OUTPATIENT
Start: 2020-10-05 | End: 2020-10-05 | Stop reason: HOSPADM

## 2020-10-05 RX ORDER — IPRATROPIUM BROMIDE AND ALBUTEROL SULFATE 2.5; .5 MG/3ML; MG/3ML
3 SOLUTION RESPIRATORY (INHALATION) EVERY 4 HOURS PRN
Status: DISCONTINUED | OUTPATIENT
Start: 2020-10-05 | End: 2020-10-07 | Stop reason: HOSPADM

## 2020-10-05 RX ORDER — FENTANYL CITRATE 50 UG/ML
INJECTION, SOLUTION INTRAMUSCULAR; INTRAVENOUS AS NEEDED
Status: DISCONTINUED | OUTPATIENT
Start: 2020-10-05 | End: 2020-10-05 | Stop reason: SURG

## 2020-10-05 RX ORDER — FENTANYL CITRATE 50 UG/ML
50 INJECTION, SOLUTION INTRAMUSCULAR; INTRAVENOUS
Status: DISCONTINUED | OUTPATIENT
Start: 2020-10-05 | End: 2020-10-05 | Stop reason: HOSPADM

## 2020-10-05 RX ORDER — SODIUM CHLORIDE, SODIUM LACTATE, POTASSIUM CHLORIDE, CALCIUM CHLORIDE 600; 310; 30; 20 MG/100ML; MG/100ML; MG/100ML; MG/100ML
INJECTION, SOLUTION INTRAVENOUS CONTINUOUS PRN
Status: DISCONTINUED | OUTPATIENT
Start: 2020-10-05 | End: 2020-10-05 | Stop reason: SURG

## 2020-10-05 RX ORDER — MIDAZOLAM HYDROCHLORIDE 1 MG/ML
INJECTION INTRAMUSCULAR; INTRAVENOUS AS NEEDED
Status: DISCONTINUED | OUTPATIENT
Start: 2020-10-05 | End: 2020-10-05 | Stop reason: SURG

## 2020-10-05 RX ORDER — PROPOFOL 10 MG/ML
VIAL (ML) INTRAVENOUS AS NEEDED
Status: DISCONTINUED | OUTPATIENT
Start: 2020-10-05 | End: 2020-10-05 | Stop reason: SURG

## 2020-10-05 RX ADMIN — DIGOXIN 250 MCG: 250 TABLET ORAL at 12:33

## 2020-10-05 RX ADMIN — HYDROMORPHONE HYDROCHLORIDE 1 MG: 2 INJECTION, SOLUTION INTRAMUSCULAR; INTRAVENOUS; SUBCUTANEOUS at 07:46

## 2020-10-05 RX ADMIN — PROPOFOL 50 MCG/KG/MIN: 10 INJECTION, EMULSION INTRAVENOUS at 10:41

## 2020-10-05 RX ADMIN — VANCOMYCIN HYDROCHLORIDE 1500 MG: 10 INJECTION, POWDER, LYOPHILIZED, FOR SOLUTION INTRAVENOUS at 05:12

## 2020-10-05 RX ADMIN — CEFEPIME HYDROCHLORIDE 2 G: 2 INJECTION, POWDER, FOR SOLUTION INTRAVENOUS at 15:44

## 2020-10-05 RX ADMIN — LOSARTAN POTASSIUM 100 MG: 50 TABLET, FILM COATED ORAL at 12:22

## 2020-10-05 RX ADMIN — PROPOFOL 10 MG: 10 INJECTION, EMULSION INTRAVENOUS at 10:45

## 2020-10-05 RX ADMIN — OXYCODONE HYDROCHLORIDE 10 MG: 5 TABLET ORAL at 21:13

## 2020-10-05 RX ADMIN — POTASSIUM CHLORIDE 10 MEQ: 750 TABLET, EXTENDED RELEASE ORAL at 12:23

## 2020-10-05 RX ADMIN — POTASSIUM CHLORIDE 10 MEQ: 750 TABLET, EXTENDED RELEASE ORAL at 21:13

## 2020-10-05 RX ADMIN — FENTANYL CITRATE 100 MCG: 50 INJECTION, SOLUTION INTRAMUSCULAR; INTRAVENOUS at 10:39

## 2020-10-05 RX ADMIN — Medication 10 ML: at 07:48

## 2020-10-05 RX ADMIN — HYDROMORPHONE HYDROCHLORIDE 1 MG: 2 INJECTION, SOLUTION INTRAMUSCULAR; INTRAVENOUS; SUBCUTANEOUS at 16:35

## 2020-10-05 RX ADMIN — HYDROMORPHONE HYDROCHLORIDE 1 MG: 2 INJECTION, SOLUTION INTRAMUSCULAR; INTRAVENOUS; SUBCUTANEOUS at 22:14

## 2020-10-05 RX ADMIN — METOPROLOL TARTRATE 37.5 MG: 25 TABLET, FILM COATED ORAL at 08:00

## 2020-10-05 RX ADMIN — Medication 10 ML: at 21:13

## 2020-10-05 RX ADMIN — PREGABALIN 150 MG: 75 CAPSULE ORAL at 12:23

## 2020-10-05 RX ADMIN — HYDROMORPHONE HYDROCHLORIDE 1 MG: 2 INJECTION, SOLUTION INTRAMUSCULAR; INTRAVENOUS; SUBCUTANEOUS at 12:24

## 2020-10-05 RX ADMIN — MIDAZOLAM 2 MG: 1 INJECTION INTRAMUSCULAR; INTRAVENOUS at 10:35

## 2020-10-05 RX ADMIN — IPRATROPIUM BROMIDE AND ALBUTEROL SULFATE 3 ML: 2.5; .5 SOLUTION RESPIRATORY (INHALATION) at 14:56

## 2020-10-05 RX ADMIN — METOPROLOL TARTRATE 37.5 MG: 25 TABLET, FILM COATED ORAL at 21:12

## 2020-10-05 RX ADMIN — ATORVASTATIN CALCIUM 40 MG: 40 TABLET, FILM COATED ORAL at 21:13

## 2020-10-05 RX ADMIN — ASPIRIN 81 MG: 81 TABLET, COATED ORAL at 12:22

## 2020-10-05 RX ADMIN — IPRATROPIUM BROMIDE AND ALBUTEROL SULFATE 3 ML: 2.5; .5 SOLUTION RESPIRATORY (INHALATION) at 19:55

## 2020-10-05 RX ADMIN — PROPOFOL 40 MG: 10 INJECTION, EMULSION INTRAVENOUS at 10:41

## 2020-10-05 RX ADMIN — CEFEPIME HYDROCHLORIDE 2 G: 2 INJECTION, POWDER, FOR SOLUTION INTRAVENOUS at 23:38

## 2020-10-05 RX ADMIN — DILTIAZEM HYDROCHLORIDE 120 MG: 120 CAPSULE, COATED, EXTENDED RELEASE ORAL at 08:00

## 2020-10-05 RX ADMIN — SODIUM CHLORIDE, SODIUM LACTATE, POTASSIUM CHLORIDE, AND CALCIUM CHLORIDE: .6; .31; .03; .02 INJECTION, SOLUTION INTRAVENOUS at 10:35

## 2020-10-05 RX ADMIN — IPRATROPIUM BROMIDE AND ALBUTEROL SULFATE 3 ML: 2.5; .5 SOLUTION RESPIRATORY (INHALATION) at 08:07

## 2020-10-05 RX ADMIN — HYDROMORPHONE HYDROCHLORIDE 1 MG: 2 INJECTION, SOLUTION INTRAMUSCULAR; INTRAVENOUS; SUBCUTANEOUS at 02:58

## 2020-10-05 RX ADMIN — CEFEPIME HYDROCHLORIDE 2 G: 2 INJECTION, POWDER, FOR SOLUTION INTRAVENOUS at 00:21

## 2020-10-05 RX ADMIN — PREGABALIN 150 MG: 75 CAPSULE ORAL at 21:12

## 2020-10-05 RX ADMIN — CEFEPIME HYDROCHLORIDE 2 G: 2 INJECTION, POWDER, FOR SOLUTION INTRAVENOUS at 07:46

## 2020-10-05 NOTE — PROGRESS NOTES
[facility]        LOS:  LOS: 3 days   Patient Name: Ro Nathan  Age/Sex: 75 y.o. male  : 1945  MRN: [id number]    Day of Service: 10/05/20   Length of Stay: 3  Encounter Provider: KOURTNEY Mccormick  Place of Service: [facility] CARDIOLOGY  Patient Care Team:  Yusuf Jones MD as PCP - Joshua Evangelista MD as Consulting Physician (Cardiology)  Deam, Moise Kathleen MD as Consulting Physician (Cardiac Electrophysiology)    Subjective:     Chief Complaint: f/u preoperative risk assessment, CAD, h/o afib    Subjective: Patient to go to OR today.  He has no acute complaints.  Denies chest pain or SOA.  He is currently off his Effient for surgery, no acute cardio meds overnight.  No acute CV events or concerns presently, okay for OR today    Current Medications:   Scheduled Meds:[START ON 10/6/2020] !Vancomycin Level Draw Needed, , Does not apply, Once  aspirin, 81 mg, Oral, Daily  atorvastatin, 40 mg, Oral, Nightly  cefepime, 2 g, Intravenous, Q8H  digoxin, 250 mcg, Oral, Daily  dilTIAZem CD, 120 mg, Oral, Daily  ipratropium-albuterol, 3 mL, Nebulization, 4x Daily - RT  losartan, 100 mg, Oral, Daily  metoprolol tartrate, 37.5 mg, Oral, Q12H  Pharmacy to dose vancomycin, , Does not apply, Q12H  potassium chloride, 10 mEq, Oral, BID  pregabalin, 150 mg, Oral, BID  sodium chloride, 10 mL, Intravenous, Q12H  vancomycin, 1,500 mg, Intravenous, Q12H      Continuous Infusions:     Allergies:  Allergies   Allergen Reactions   • Haloperidol Hives   • Morphine Itching   • Pantoprazole Other (See Comments)     Feels sick after receiving       Review of Systems   Constitution: Negative.   Cardiovascular: Negative.    Respiratory: Negative.    Gastrointestinal: Negative.    Genitourinary: Negative.    Neurological: Negative.          Objective:     Temp:  [97.5 °F (36.4 °C)-98.6 °F (37 °C)] 97.5 °F (36.4 °C)  Heart Rate:  [70-76] 74  Resp:  [15-20]  18  BP: (145-166)/(79-84) 166/84     Intake/Output Summary (Last 24 hours) at 10/5/2020 0941  Last data filed at 10/4/2020 1900  Gross per 24 hour   Intake 1200 ml   Output --   Net 1200 ml     Body mass index is 33.79 kg/m².      10/02/20  1500 10/03/20  0316 10/04/20  0350   Weight: 119 kg (262 lb) 113 kg (248 lb 14.4 oz) 113 kg (249 lb 1.9 oz)         Physical Exam:  General Appearance:    Alert, cooperative, in no acute distress               Neck:   supple, no JVD   Lungs:     Clear to auscultation,respirations regular, even and                  unlabored    Heart:    Regular rhythm and normal rate, normal S1 and S2, 1/6 murmur noted   Abdomen:     Normal bowel sounds, soft non tender   Extremities:   Moves all extremities well, no edema, no cyanosis, no             redness   Pulses:   Pulses palpable and equal bilaterally   Skin:   Right foot wrapped   Neurologic:   Awake, alert, oriented x3     Agree with physical exam findings as assessed face-to-face on my encounter    Lab Review:   Results from last 7 days   Lab Units 10/05/20  0325 10/04/20  0420   SODIUM mmol/L 134* 136   POTASSIUM mmol/L 4.8 4.5   CHLORIDE mmol/L 101 103   CO2 mmol/L 26.0 25.0   BUN  20 18   CREATININE mg/dL 1.12 1.28*   GLUCOSE mg/dL 140* 171*   CALCIUM mg/dL 8.9 8.7   AST (SGOT) U/L  --  41*   ALT (SGPT) U/L  --  14         Results from last 7 days   Lab Units 10/05/20  0325 10/04/20  0420   WBC 10*3/mm3 8.10 7.50   HEMOGLOBIN g/dL 12.4* 12.7*   HEMATOCRIT % 36.8* 37.0*   PLATELETS 10*3/mm3 299 330                   Invalid input(s): LDLCALC            Recent Radiology:  Imaging Results (Most Recent)     Procedure Component Value Units Date/Time    NM Bone Scan 3 Phase [[id number]] Collected: 10/05/20 0803     Updated: 10/05/20 0817    Narrative:      DATE OF EXAM:  10/4/2020 10:00 AM     PROCEDURE:  NM BONE SCAN 3 PHASE-     INDICATIONS:  Diabetes mellitus, right foot wound, assess for osteomyelitis.     COMPARISON:  Right foot and  ankle x-ray performed on 10/2/2020.     TECHNIQUE:   26.4 mCi of technetium 99m labeled MDP was administered intravenously  and scintigraphic imaging occurred through the feet and ankles in three  phases per protocol.     FINDINGS:  There is hyperemia to the right foot on the early blood flow images.  There is increased soft tissue uptake within the right foot on the blood  pool images. On the delayed images, there is intense activity seen in  the right midfoot and along the plantar aspect of the lateral right  forefoot. The midfoot activity is probably secondary to degenerative  changes. The lateral forefoot activity is suspicious for osteomyelitis  in the fifth metatarsal bone after reviewing the plain x-rays. There is  uptake seen within the left mid foot. I have no plain films for  comparison, but this appears in a similar distribution to the right foot  and probably represents degenerative changes.        Impression:      Abnormal uptake within the lateral aspect of the right forefoot  suspicious for osteomyelitis in the fifth metatarsal bone. The hyperemia  and increased blood pool uptake to the right foot would indicate  cellulitis.     Electronically Signed By-Yusuf Gray On:10/5/2020 8:08 AM  This report was finalized on [id number] by  Yusuf Gray, .    XR Chest 1 View [[id number]] Collected: 10/02/20 2306     Updated: 10/02/20 2310    Narrative:      DATE OF EXAM:  10/2/2020 10:35 PM     PROCEDURE:  XR CHEST 1 VW-     INDICATIONS:  SOB     COMPARISON:  Chest radiograph 12/8/2019 and 12/3/2019. CT abdomen pelvis 08/04/2020.     TECHNIQUE:   Single radiographic AP view of the chest was obtained.     FINDINGS:  The study is mildly limited by lordotic patient positioning. Overlying  artifacts. Stable left chest wall cardiac pacer device. The lungs are  clear of consolidation. No pneumothorax. Unchanged cardiomediastinal  contours. Partially visualized thoracic spondylosis and degenerative  changes in both  shoulders. Multiple old right rib fracture deformities.  No acute osseous abnormality is identified.        Impression:      Mildly limited study demonstrating no active disease.     Electronically Signed By-Shahriar Garcia On:10/2/2020 11:07 PM  This report was finalized on [id number] by  Shahriar Garcia, .    CT Lower Extremity Right Without Contrast [[id number]] Collected: 10/02/20 2105     Updated: 10/02/20 2122    Narrative:         DATE OF EXAM:   10/2/2020 5:24 PM     PROCEDURE:   CT LOWER EXTREMITY RIGHT WO CONTRAST-     INDICATIONS:   Foot swelling, diabetic, osteomyelitis suspected, no prior imaging     COMPARISON:  Radiographs 10-20 20. CT abdomen pelvis 08/04/2020.     TECHNIQUE:   CT of the right ankle and foot was obtained without the administration  of contrast. Coronal and sagittal reformats were obtained. Automated  exposure control and alternative reconstruction methods were used.     FINDINGS:   Motion artifact mildly limits evaluation.     No evidence of acute fracture or dislocation. Partially visualized wound  lateral to the fifth metatarsal head with underlying soft tissue  thickening. No underlying loculated fluid collection to suggest abscess.  Mild diffuse soft tissue edema. No lytic or sclerotic changes are seen  to suggest osteomyelitis.     Well-corticated 6 mm ossification along the anterior distal lateral  malleolus, likely sequela of remote trauma. There is also a 12 mm  ossification lateral to the peroneal tubercle could reflect sequela of  remote trauma or developmental variant.     Small posterior and plantar calcaneal enthesophytes. Mild thickening of  the Achilles tendon. Focal calcification of the thickened proximal  plantar fascia.     Moderate to advanced multifocal arthropathic changes at the midfoot with  likely degenerative cystic changes in the navicular, middle and lateral  cuneiforms, the cuboid, and the bases of the third, fourth, and fifth  metatarsals. Mild DJD at the  great toe MTP joint.     Mild thickening of the distal peroneus longus tendon, suggesting  tendinopathy. No cystic or solid soft tissue mass. The sinus tarsi is  unremarkable. The intermetatarsal spaces are unremarkable.        Impression:         1. Partially visualized wound lateral to the fifth metatarsal head with  underlying soft tissue thickening and diffuse soft tissue edema. No  loculated fluid collection is seen to suggest abscess.  2. Motion degraded study demonstrating no CT evidence of osteomyelitis.  3. Moderate to advanced multifocal arthropathic changes at the midfoot,  likely developing Charcot arthropathy.  4. Thickening of the distal peroneus longus tendon, statistically  tendinopathy, with a chronic ossification lateral to the peroneal  tubercle.  5. Mild Achilles tendinopathy and chronic plantar fasciitis.     Electronically Signed By-Shahriar Garcia On:10/2/2020 9:19 PM  This report was finalized on [id number] by  Shahriar Garcia, .    XR Ankle 3+ View Right [[id number]] Collected: 10/02/20 1230     Updated: 10/02/20 1234    Narrative:      DATE OF EXAM:  10/2/2020 12:07 PM     PROCEDURE:  XR ANKLE 3+ VW RIGHT-     INDICATIONS:  open wound right foot, 5th metatarsal head. Assess for soft tissue  emphysema.     COMPARISON:  Right foot radiographs 11/02/2020     TECHNIQUE:   3 views of the right ankle(s) was/were obtained.     FINDINGS:  No right ankle fracture or joint dislocation is seen. No evidence of  osteomyelitis in the right ankle. No retained radiopaque foreign body.  No subcutaneous gas. Small enthesophytes at the Achilles and plantar  tendon insertions upon the posterior calcaneus.        Impression:         1. Normal right ankle. No acute osseous abnormality or evidence of  osteomyelitis of the right ankle.  2. No  subcutaneous gas is seen.     Electronically Signed By-Dr. Ekaterina Goins MD On:10/2/2020 12:32 PM  This report was finalized on [id number] by Dr. Ektaerina Goins MD.    XR  Foot 3+ View Right [[id number]] Collected: 10/02/20 1231     Updated: 10/02/20 1234    Narrative:      DATE OF EXAM:  10/2/2020 12:08 PM     PROCEDURE:  XR FOOT 3+ VW RIGHT-     INDICATIONS:  open wound right foot, 5th metatarsal head     COMPARISON:  Foot radiograph 8/16/2018     TECHNIQUE:   A minimum of three routine standard radiographic views were obtained of  the right foot.     FINDINGS:  There is bandage material along the foot which limits fine osseous  detail. There is soft tissue gas focus at the head of the fifth  metatarsal. There is indistinct margins of the cortex at the lateral  aspect head of the fifth metatarsal. This is consistent with  osteomyelitis given the clinical history. Soft tissue infection is also  likely given the presence of small amount of soft tissue gas. There are  hammertoe deformities with abnormal alignment of the second third fourth  metatarsal phalange joints. There is abnormal alignment at the first  metatarsal phalange joint. There is mild diffuse soft tissue swelling.  There is osteopenia. There is mild arthritis of the tarsometatarsal  joints and talonavicular joint. There is a small plantar calcaneal spur.  There is a small enthesophyte Achilles tendon insertion site.        Impression:      Findings consistent with ostomy myelitis with osseous destruction along  lateral margin of the fifth metatarsal head. Small foci of soft tissue  gas suggest soft tissue infection at this location as well.     Electronically Signed By-Antoinette August MD On:10/2/2020 12:32 PM  This report was finalized on [id number] by  Antoinette August MD.          ECHOCARDIOGRAM:    Results for orders placed during the hospital encounter of 12/03/19   Adult Transthoracic Echo Complete W/ Cont if Necessary Per Protocol    Narrative · Mild mitral valve regurgitation is present  · Mild dilation of the sinuses of Valsalva is present.  · Left ventricular systolic function is hyperdynamic (EF > 70).  · Left atrial  cavity size is severely dilated.  · Right ventricular cavity is mildly dilated.  · Left ventricular diastolic dysfunction (grade II) consistent with   pseudonormalization.     Overall normal LV systolic function and size  Normal RV function, mild RV enlargement  Severe left atrial enlargement  Diastolic dysfunction indeterminate by criteria  No masses  No effusion seen  EF hyperdynamic greater than 70%  No significant valvulopathy seen           I reviewed the patient's new clinical results.    EKG:      Assessment:       Diabetic foot ulcer (CMS/HCC)    Osteomyelitis (CMS/HCC)    1. Pre-operative Cardiac Evaluation  - last cath 12/2019   - 2D echo 12/2019 showed EF > 70%, grade 2 diastolic dysfunction, mild MR.  - EKG is V-paced / non-diagnostic; underlying afib     2. R foot wound/Osteomyelitis     3. CAD s/p PCI with MICHAEL to LAD 12/2019   - residual borderline disease of the LCx being treated medically.  - on dual antiplatelet therapy with aspirin and effient, BB, high dose statin at home     4. Afib / PPM  - rate controlled with BB, CCB, and PO dig at home  - not on anticoagulation given hx GIB     5. COPD     6. stage III CKD     7. HTN     8. HLD       Plan:   Plan for OR today  We will assess CV status and optimize medicines postoperatively  Patient is s/p PCI to LAD 12/2019- currently off Effient for surgery- he has been on DAPT for almost 10 mo but still < 1 year, would recommend resuming Effient ASAP when okay with podiatry  Rate controlled currently, not on a/c d/t h/o GIB  Hemodynamically electrically stable      We will follow, it is a pleasure involved in his cardiovascular care.  Please call with any questions or concerns  Joshua Yip MD, PhD        Jen Cole, KOURTNEY  10/05/20  09:41 EDT

## 2020-10-05 NOTE — PROGRESS NOTES
Discharge Planning Assessment   [facility]     Patient Name: Ro Nathan  MRN: [id number]  Today's Date: 10/5/2020    Admit Date: 10/2/2020    Discharge Needs Assessment     Row Name 10/05/20 1709       Living Environment    Lives With  spouse    Current Living Arrangements  home/apartment/condo    Primary Care Provided by  self;spouse/significant other    Provides Primary Care For  no one    Family Caregiver if Needed  spouse    Able to Return to Prior Arrangements  yes       Resource/Environmental Concerns    Resource/Environmental Concerns  none       Transition Planning    Patient/Family Anticipates Transition to  home with family    Patient/Family Anticipated Services at Transition  none    Transportation Anticipated  family or friend will provide       Discharge Needs Assessment    Readmission Within the Last 30 Days  no previous admission in last 30 days    Equipment Currently Used at Home  walker, rolling;crutches    Concerns to be Addressed  denies needs/concerns at this time    Concerns Comments  Surgery 10/5 waiting on cultures - IV antibiotics.    Anticipated Changes Related to Illness  none    Outpatient/Agency/Support Group Needs  homecare agency    Discharge Coordination/Progress  DC Plan: from home w/spouse. Surgery 10/5, IV antibiotics, await cultures per ID.        Discharge Plan     Row Name 10/05/20 1714       Plan    Plan  DC Plan: from home w/spouse. Surgery 10/5, IV antibiotics, await cultures per ID.        Continued Care and Services - Admitted Since 10/2/2020    Coordination has not been started for this encounter.         Demographic Summary     Row Name 10/05/20 1702       General Information    Admission Type  inpatient    Arrived From  emergency department    Required Notices Provided  Important Message from Medicare    Referral Source  admission list    Reason for Consult  discharge planning    Preferred Language  English                     Margy Serrano RN

## 2020-10-05 NOTE — ANESTHESIA PREPROCEDURE EVALUATION
Anesthesia Evaluation     Patient summary reviewed and Nursing notes reviewed   NPO Solid Status: > 8 hours  NPO Liquid Status: > 4 hours           Airway   Mallampati: I  TM distance: >3 FB  Neck ROM: full  No difficulty expected  Dental - normal exam     Pulmonary - normal exam   (+) COPD mild, shortness of breath, sleep apnea,   Cardiovascular - normal exam    (+) hypertension well controlled 2 medications or greater, CAD, dysrhythmias Atrial Fib, angina, CHF Diastolic >=55%, hyperlipidemia,       Neuro/Psych  (+) numbness,     GI/Hepatic/Renal/Endo    (+)   diabetes mellitus type 2 poorly controlled,     Musculoskeletal     Abdominal  - normal exam    Bowel sounds: normal.   Substance History - negative use     OB/GYN negative ob/gyn ROS         Other   arthritis,    history of cancer                    Anesthesia Plan    ASA 4     MAC     intravenous induction     Anesthetic plan, all risks, benefits, and alternatives have been provided, discussed and informed consent has been obtained with: patient.    Plan discussed with CRNA and CAA.      
no fever and no chills.

## 2020-10-05 NOTE — OP NOTE
Operative Note   Foot and Ankle Surgery   Provider: Dr. Josef Egan DPM  Location: [facility]      Procedure:  1.  Incision and drainage to the level of bone, right foot  2.  Bone biopsy, right fifth metatarsal head  3.  Bone biopsy, proximal phalanx right fifth toe  4.  Interpretation of intraoperative fluoroscopy    Pre-operative Diagnosis:   1.  Diabetic foot ulcer with necrosis of muscle, right foot  2.  Osteomyelitis, right foot  3.  Type 2 diabetes mellitus with peripheral neuropathy    Post-operative Diagnosis: Same    Surgeon: Dr. Josef Egan DPM    Assistant: None    Anesthesia: MAC    Implants: None    Findings: No purulence, malodor or any new signs or symptoms compared to preoperative state.    Specimen: Bone biopsy, right fifth metatarsal head; bone biopsy, proximal phalanx right fifth toe; aerobic, anaerobic, acid-fast bacilli, fungal swab culture, right foot.    Blood Loss: Less than 5cc    Complications: None    Post Op Plan: Patient will be readmitted back to the floor.  His dressings to remain clean, dry, intact.  He is to remain strict nonweightbearing to the right lower extremity.  Follow bone biopsy and culture swabs for definitive treatment.    Summary:    The patient was seen in the preoperative holding area prior to the procedure.  He has been n.p.o. for greater than 8 hours.  He denied any nausea, fever, vomiting, chills.  There is been no change in his pain level since I saw him yesterday.    Procedure, risks, complications, and goals were discussed with the patient at bedside.  Risks include but are not limited to infection, complications from anesthesia (including death), chronic pain or numbness, hematoma/seroma, deep vein thrombosis, wound complications, and potential for additional surgical procedures.  Patient understands and elects to proceed with surgery at this time. Informed consent was obtained before proceeding to the operating suite.  All questions  were answered to the patient's satisfaction. No guarantees or assurances were given or implied.    The patient was then wheeled from the preoperative holding area to the operating room and placed on the OR table in the supine position.  Once anesthesia was adequately given, his right lower extremity was then prepped and draped in the usual sterile fashion.  A timeout ensued to confirm the correct extremity and procedure being performed.    Next, a #15 blade was then utilized to crosshatch and debride his lateral fifth metatarsal head wound.  Preoperative debridement measurements were 1.5 x 2.0 x 0.4 cm to the level of capsule.  After thoroughly debriding his wound is postoperative measurements remain the same.  Utilizing intraoperative fluoroscopy, I was able to confirm accurately where his fifth metatarsal head as well as as the proximal phalanx of his fifth toe were.  Knowing this, I first introduced the Jamshidi needle laterally through the wound into his fifth metatarsal head.  I was able to obtain an adequate amount of bone to be sent to pathology.  This bone was transferred from the operative field to the back table and placed into a sterile specimen cup.  The specimen will be sent to pathology for analysis.  Next, utilizing #15 blade to create a small stab incision to the dorsal lateral aspect of the proximal phalanx of the fifth toe.  This incision was then deepened to the level of bone utilizing mosquito hemostat.  Again, the Jamshidi needle was introduced through this incision and a small piece of bone was obtained through the Jamshidi needle and transferred from the operative field to the back table and placed into another sterile specimen cup and sent to pathology for analysis.  Lastly, a swab culture was then taken through the lateral aspect of his fifth metatarsal head wound.  This will be sent for A&A, AFB, and fungal cultures.  Next, utilizing a pulse , his wounds were thoroughly irrigated  with 3 L of sterile normal saline.  The patient was then placed into a Betadine wet-to-dry dressing.  He was then transferred from the OR to the PACU with vital signs stable and neurovascular status unchanged from his preoperative state.  The patient will likely have to undergo either a repeat debridement with application of skin graft or possible partial fifth ray amputation.  This plan will be determined by the results of the bone biopsies.  His dressings remain clean, dry, intact.  Continue strict nonweightbearing to right lower extremity.  Please call with any questions you may have.      Dr. Josef Egan, [facility]  [phone]    Note is dictated utilizing voice recognition software. Unfortunately this leads to occasional typographical errors. I apologize in advance if the situation occurs. If questions occur please do not hesitate to call our office.

## 2020-10-05 NOTE — PROGRESS NOTES
LOS: 3 days   Patient Care Team:  Yusuf Jones MD as PCP - General  Joshua Yip MD as Consulting Physician (Cardiology)  Deam, Moise Kathleen MD as Consulting Physician (Cardiac Electrophysiology)    Subjective:  No acute pain//no new complaints    Objective:   Afebrile      Review of Systems:   Review of Systems   Constitutional: Positive for activity change.   Respiratory: Positive for shortness of breath.    Cardiovascular: Negative.    Musculoskeletal: Positive for arthralgias and back pain.           Vital Signs  Temp:  [97.5 °F (36.4 °C)-98.6 °F (37 °C)] 97.5 °F (36.4 °C)  Heart Rate:  [70-76] 71  Resp:  [15-20] 18  BP: (145-162)/(79-80) 145/80    Physical Exam:  Physical Exam  Constitutional:       Appearance: Normal appearance.   Cardiovascular:      Rate and Rhythm: Normal rate.   Pulmonary:      Effort: Pulmonary effort is normal.   Skin:     General: Skin is warm.   Neurological:      General: No focal deficit present.      Mental Status: He is alert.          Radiology:  Xr Ankle 3+ View Right    Result Date: 10/2/2020   1. Normal right ankle. No acute osseous abnormality or evidence of osteomyelitis of the right ankle. 2. No  subcutaneous gas is seen.  Electronically Signed By-Dr. Ekaterina Goins MD On:10/2/2020 12:32 PM This report was finalized on [id number] by Dr. Ekaterina Goins MD.    Xr Foot 3+ View Right    Result Date: 10/2/2020  Findings consistent with ostomy myelitis with osseous destruction along lateral margin of the fifth metatarsal head. Small foci of soft tissue gas suggest soft tissue infection at this location as well.  Electronically Signed By-Antoinette August MD On:10/2/2020 12:32 PM This report was finalized on [id number] by  Antoinette August MD.    Xr Chest 1 View    Result Date: 10/2/2020  Mildly limited study demonstrating no active disease.  Electronically Signed By-Shahriar Garcia On:10/2/2020 11:07 PM This report was finalized on [id number] by  Shahriar Garcia  .    Ct Lower Extremity Right Without Contrast    Result Date: 10/2/2020   1. Partially visualized wound lateral to the fifth metatarsal head with underlying soft tissue thickening and diffuse soft tissue edema. No loculated fluid collection is seen to suggest abscess. 2. Motion degraded study demonstrating no CT evidence of osteomyelitis. 3. Moderate to advanced multifocal arthropathic changes at the midfoot, likely developing Charcot arthropathy. 4. Thickening of the distal peroneus longus tendon, statistically tendinopathy, with a chronic ossification lateral to the peroneal tubercle. 5. Mild Achilles tendinopathy and chronic plantar fasciitis.  Electronically Signed By-Shahriar Garcia On:10/2/2020 9:19 PM This report was finalized on [id number] by  Shahriar Garcia, .         Results Review:     I reviewed the patient's new clinical results.  I reviewed the patient's new imaging results and agree with the interpretation.    Medication Review:   Scheduled Meds:[START ON 10/6/2020] !Vancomycin Level Draw Needed, , Does not apply, Once  aspirin, 81 mg, Oral, Daily  atorvastatin, 40 mg, Oral, Nightly  cefepime, 2 g, Intravenous, Q8H  digoxin, 250 mcg, Oral, Daily  dilTIAZem CD, 120 mg, Oral, Daily  ipratropium-albuterol, 3 mL, Nebulization, 4x Daily - RT  losartan, 100 mg, Oral, Daily  metoprolol tartrate, 37.5 mg, Oral, Q12H  Pharmacy to dose vancomycin, , Does not apply, Q12H  potassium chloride, 10 mEq, Oral, BID  pregabalin, 150 mg, Oral, BID  sodium chloride, 10 mL, Intravenous, Q12H  vancomycin, 1,500 mg, Intravenous, Q12H      Continuous Infusions:   PRN Meds:.HYDROmorphone  •  oxyCODONE    Labs:    CBC    Results from last 7 days   Lab Units 10/05/20  0325 10/04/20  0420 10/03/20  0247 10/02/20  2355 10/02/20  1401   WBC 10*3/mm3 8.10 7.50 7.20 7.30 6.80   HEMOGLOBIN g/dL 12.4* 12.7* 12.3* 12.4* 12.2*   PLATELETS 10*3/mm3 299 330 315 307 323     BMP   Results from last 7 days   Lab Units 10/05/20  0325  10/04/20  0420 10/03/20  0247 10/02/20  2355 10/02/20  1401   SODIUM mmol/L 134* 136 137 136 139   POTASSIUM mmol/L 4.8 4.5 4.5 4.2 4.3   CHLORIDE mmol/L 101 103 103 101 102   CO2 mmol/L 26.0 25.0 26.0 26.0 26.0   BUN  20 18 22 24* 26*   CREATININE mg/dL 1.12 1.28* 1.26 1.20 1.15   GLUCOSE mg/dL 140* 171* 149* 163* 127*     Cr Clearance Estimated Creatinine Clearance: 75.1 mL/min (by C-G formula based on SCr of 1.12 mg/dL).  Coag     HbA1C   Lab Results   Component Value Date    HGBA1C 6.6 (H) 10/02/2020    HGBA1C 5.6 08/04/2020     Blood Glucose   Glucose   Date/Time Value Ref Range Status   10/05/2020 0717 128 (H) 70 - 105 mg/dL Final     Comment:     Serial Number: [id number]:  [id number]   10/04/2020 1957 164 (H) 70 - 105 mg/dL Final     Comment:     Serial Number: [id number]:  [id number]   10/04/2020 1615 119 (H) 70 - 105 mg/dL Final     Comment:     Serial Number: [id number]:  [id number]   10/04/2020 1123 137 (H) 70 - 105 mg/dL Final     Comment:     Serial Number: [id number]:  [id number]   10/04/2020 0758 227 (H) 70 - 105 mg/dL Final     Comment:     Serial Number: [id number]:  [id number]   10/03/2020 1910 189 (H) 70 - 105 mg/dL Final     Comment:     Serial Number: [id number]:  [id number]   10/03/2020 1632 217 (H) 70 - 105 mg/dL Final     Comment:     Serial Number: [id number]:  [id number]   10/03/2020 1236 204 (H) 70 - 105 mg/dL Final     Comment:     Serial Number: [id number]:  [id number]     Infection   Results from last 7 days   Lab Units 10/02/20  2355 10/02/20  2353 10/02/20  1329   BLOODCX  No growth at 2 days No growth at 2 days  --    WOUNDCX   --   --  Scant growth (1+) Gram Negative Bacilli*  Scant growth (1+) Normal Skin Janeen     CMP   Results from last 7 days   Lab Units 10/05/20  0325 10/04/20  0420 10/03/20  0247 10/02/20  2355 10/02/20  1401   SODIUM mmol/L 134* 136 137 136 139   POTASSIUM mmol/L 4.8 4.5 4.5 4.2 4.3   CHLORIDE mmol/L 101 103  103 101 102   CO2 mmol/L 26.0 25.0 26.0 26.0 26.0   BUN  20 18 22 24* 26*   CREATININE mg/dL 1.12 1.28* 1.26 1.20 1.15   GLUCOSE mg/dL 140* 171* 149* 163* 127*   ALBUMIN g/dL  --  3.50  --   --   --    BILIRUBIN mg/dL  --  1.0  --   --   --    ALK PHOS U/L  --  195*  --   --   --    AST (SGOT) U/L  --  41*  --   --   --    ALT (SGPT) U/L  --  14  --   --   --      UA      Radiology(recent) No radiology results for the last day   Assessment:  Osteomyelitis  Type 2 diabetes with peripheral neuropathy  Diabetic foot ulcer with exposed bone right foot  Cellulitis of the right foot  Atrial fibrillation/status post pacemaker placement  COPD  History intermittent small bowel bowel obstructions, not a surgical candidate  Opioid dependence  Acute on chronic back pain  Essential hypertension  Alcoholism  History of ventral herniations  Atherosclerotic heart disease of native coronary arteries with angina pectoris      Plan:  To the operating room today        Yusuf Jones MD  10/05/20  07:43 EDT

## 2020-10-05 NOTE — PLAN OF CARE
Goal Outcome Evaluation:  Plan of Care Reviewed With: patient  Progress: improving  Outcome Summary: Patient with complaints of chronic pain on shift- see MAR. Pt had I&D this AM of right 5th toe with bone biopsies taken. pt NWB to right foot. Awaiting biopsy results to decide repeat debridement vs partial amputation. PT consulted to teach pt on NWB status. Will continue to monitor.

## 2020-10-05 NOTE — PROGRESS NOTES
Infectious Diseases Progress Note      LOS: 3 days   Patient Care Team:  Yusuf Jones MD as PCP - General  [facility] as Consulting Physician (Cardiology)  Shirley, Moise Kathleen MD as Consulting Physician (Cardiac Electrophysiology)    Chief Complaint: Right foot wound    Subjective       The patient remained afebrile.  The patient remained hemodynamically stable.  He stating his current antibiotics with no side effects.  He is on room air.  The patient is status post I&D of the right foot earlier today   .  Review of Systems:   Review of Systems   Constitutional: Negative.    HENT: Negative.    Eyes: Negative.    Respiratory: Negative.    Cardiovascular: Negative.    Gastrointestinal: Negative.    Genitourinary: Negative.    Musculoskeletal: Negative.    Skin: Positive for wound.   Neurological: Negative.    Hematological: Negative.    Psychiatric/Behavioral: Negative.         Objective     Vital Signs  Temp:  [97.5 °F (36.4 °C)-98.3 °F (36.8 °C)] 97.6 °F (36.4 °C)  Heart Rate:  [70-80] 80  Resp:  [15-21] 16  BP: (109-166)/(60-84) 150/83    Physical Exam:  Physical Exam  Vitals signs and nursing note reviewed.   Constitutional:       Appearance: He is well-developed.   HENT:      Head: Normocephalic and atraumatic.   Eyes:      Pupils: Pupils are equal, round, and reactive to light.   Neck:      Musculoskeletal: Normal range of motion and neck supple.   Cardiovascular:      Rate and Rhythm: Normal rate and regular rhythm.      Heart sounds: Normal heart sounds.   Pulmonary:      Effort: Pulmonary effort is normal. No respiratory distress.      Breath sounds: Normal breath sounds. No wheezing or rales.   Abdominal:      General: Bowel sounds are normal. There is no distension.      Palpations: Abdomen is soft. There is no mass.      Tenderness: There is no abdominal tenderness. There is no guarding or rebound.   Musculoskeletal: Normal range of motion.         General: No deformity.       Comments: Wound present on the lateral aspect of the right foot at the fifth MTP joint site   Skin:     General: Skin is warm.      Findings: No erythema or rash.   Neurological:      Mental Status: He is alert and oriented to person, place, and time.      Cranial Nerves: No cranial nerve deficit.          Results Review:    I have reviewed all clinical data, test, lab, and imaging results.     Radiology  No Radiology Exams Resulted Within Past 24 Hours    Cardiology    Laboratory    Results from last 7 days   Lab Units 10/05/20  0325 10/04/20  0420 10/03/20  0247 10/02/20  2355 10/02/20  1401   WBC 10*3/mm3 8.10 7.50 7.20 7.30 6.80   HEMOGLOBIN g/dL 12.4* 12.7* 12.3* 12.4* 12.2*   HEMATOCRIT % 36.8* 37.0* 35.9* 35.9* 35.3*   PLATELETS 10*3/mm3 299 330 315 307 323     Results from last 7 days   Lab Units 10/05/20  0325 10/04/20  0420 10/03/20  0247 10/02/20  2355 10/02/20  1401   SODIUM mmol/L 134* 136 137 136 139   POTASSIUM mmol/L 4.8 4.5 4.5 4.2 4.3   CHLORIDE mmol/L 101 103 103 101 102   CO2 mmol/L 26.0 25.0 26.0 26.0 26.0   BUN  20 18 22 24* 26*   CREATININE mg/dL 1.12 1.28* 1.26 1.20 1.15   GLUCOSE mg/dL 140* 171* 149* 163* 127*   ALBUMIN g/dL  --  3.50  --   --   --    BILIRUBIN mg/dL  --  1.0  --   --   --    ALK PHOS U/L  --  195*  --   --   --    AST (SGOT) U/L  --  41*  --   --   --    ALT (SGPT) U/L  --  14  --   --   --    CALCIUM mg/dL 8.9 8.7 8.6 8.7 8.9         Results from last 7 days   Lab Units 10/03/20  0247   SED RATE mm/hr 35*         Microbiology   Microbiology Results (last 10 days)     Procedure Component Value - Date/Time    Tissue / Bone Culture - Tissue, Foot, Right [[id number]] Collected: 10/05/20 1107    Lab Status: Preliminary result Specimen: Tissue from Foot, Right Updated: 10/05/20 1257     Gram Stain Few (2+) WBCs per low power field      No organisms seen    Blood Culture - Blood, Arm, Right [[id number]] Collected: 10/02/20 1327    Lab Status: Preliminary result Specimen: Blood from  Arm, Right Updated: 10/05/20 0015     Blood Culture No growth at 2 days    Blood Culture - Blood, Arm, Left [[id number]] Collected: 10/02/20 6083    Lab Status: Preliminary result Specimen: Blood from Arm, Left Updated: 10/05/20 0015     Blood Culture No growth at 2 days    Wound Culture - Wound, Foot, Right [[id number]]  (Abnormal)  (Susceptibility) Collected: 10/02/20 1329    Lab Status: Final result Specimen: Wound from Foot, Right Updated: 10/05/20 0959     Wound Culture Scant growth (1+) Pseudomonas aeruginosa      Scant growth (1+) Proteus mirabilis      Scant growth (1+) Normal Skin Janeen     Gram Stain Less than 10 WBCs seen      Rare (1+) Gram positive cocci      Rare (1+) Gram negative bacilli    Susceptibility      Pseudomonas aeruginosa     YASMIN     Cefepime Susceptible     Ceftazidime Susceptible     Ciprofloxacin Susceptible     Gentamicin Susceptible     Levofloxacin Susceptible     Piperacillin + Tazobactam Susceptible                Susceptibility      Proteus mirabilis     YASMIN     Ampicillin Susceptible     Ampicillin + Sulbactam Susceptible     Cefepime Susceptible     Ceftazidime Susceptible     Ceftriaxone Susceptible     Gentamicin Susceptible     Levofloxacin Susceptible     Piperacillin + Tazobactam Susceptible     Tetracycline Resistant     Trimethoprim + Sulfamethoxazole Susceptible                Susceptibility Comments     Proteus mirabilis    Cefazolin sensitivity will not be reported for Enterobacteriaceae in non-urine isolates. If cefazolin is preferred, please call the microbiology lab to request an E-test.  With the exception of urinary-sourced infections, aminoglycosides should not be used as monotherapy.                   Medication Review:       Schedule Meds  [START ON 10/6/2020] !Vancomycin Level Draw Needed, , Does not apply, Once  aspirin, 81 mg, Oral, Daily  atorvastatin, 40 mg, Oral, Nightly  cefepime, 2 g, Intravenous, Q8H  digoxin, 250 mcg, Oral, Daily  dilTIAZem CD, 120 mg,  Oral, Daily  [START ON 10/6/2020] enoxaparin, 40 mg, Subcutaneous, Daily  ipratropium-albuterol, 3 mL, Nebulization, 4x Daily - RT  losartan, 100 mg, Oral, Daily  metoprolol tartrate, 37.5 mg, Oral, Q12H  Pharmacy to dose vancomycin, , Does not apply, Q12H  potassium chloride, 10 mEq, Oral, BID  pregabalin, 150 mg, Oral, BID  sodium chloride, 10 mL, Intravenous, Q12H  vancomycin, 1,500 mg, Intravenous, Q12H        Infusion Meds       PRN Meds  HYDROmorphone  •  ipratropium-albuterol  •  oxyCODONE        Assessment/Plan       Antimicrobial Therapy   1.  IV cefepime      day  2.  IV vancomycin      day  3.      Day  4.      Day  5.      Day        Assessment    Right diabetic foot wound.  Initial plain x-ray was suspicious for osteomyelitis however CT scan was negative.  Unable to obtain MRI since patient had a pacemaker placement in the past  Wound culture is growing pseudomonas aeruginosa and Proteus mirabilis  Bone scan finding was consistent with osteomyelitis of the fifth metatarsal head  Intraoperative culture is pending  The patient is status post I&D of the right foot with bone biopsy on October 5, 2020    Patient states that he was just recently told he had diabetes but has not started treatment    CAD stent placement, CHF, A. Fib    Pacemaker placement    History of prostate cancer    Plan    Discontinue IV vancomycin   Continue IV cefepime 2 g every 8 hours  Waiting on intraoperative culture results  Continue supportive care  A.mThania Morton MD  10/05/20  14:44 EDT      Note is dictated utilizing voice recognition software/Dragon

## 2020-10-06 LAB
ALBUMIN SERPL-MCNC: 3.3 G/DL (ref 3.5–5.2)
ALBUMIN/GLOB SERPL: 1.1 G/DL
ALP SERPL-CCNC: 224 U/L (ref 39–117)
ALT SERPL W P-5'-P-CCNC: 14 U/L (ref 1–41)
ANION GAP SERPL CALCULATED.3IONS-SCNC: 7 MMOL/L (ref 5–15)
AST SERPL-CCNC: 30 U/L (ref 1–40)
BASOPHILS # BLD AUTO: 0.1 10*3/MM3 (ref 0–0.2)
BASOPHILS NFR BLD AUTO: 1.3 % (ref 0–1.5)
BILIRUB SERPL-MCNC: 1.1 MG/DL (ref 0–1.2)
BUN SERPL-MCNC: 20 MG/DL (ref 8–23)
BUN SERPL-MCNC: ABNORMAL MG/DL
BUN/CREAT SERPL: ABNORMAL
CALCIUM SPEC-SCNC: 9 MG/DL (ref 8.6–10.5)
CHLORIDE SERPL-SCNC: 100 MMOL/L (ref 98–107)
CO2 SERPL-SCNC: 26 MMOL/L (ref 22–29)
CREAT SERPL-MCNC: 1.08 MG/DL (ref 0.76–1.27)
DEPRECATED RDW RBC AUTO: 48.1 FL (ref 37–54)
EOSINOPHIL # BLD AUTO: 0.7 10*3/MM3 (ref 0–0.4)
EOSINOPHIL NFR BLD AUTO: 8.4 % (ref 0.3–6.2)
ERYTHROCYTE [DISTWIDTH] IN BLOOD BY AUTOMATED COUNT: 15.5 % (ref 12.3–15.4)
GFR SERPL CREATININE-BSD FRML MDRD: 67 ML/MIN/1.73
GLOBULIN UR ELPH-MCNC: 3 GM/DL
GLUCOSE BLDC GLUCOMTR-MCNC: 117 MG/DL (ref 70–105)
GLUCOSE BLDC GLUCOMTR-MCNC: 140 MG/DL (ref 70–105)
GLUCOSE BLDC GLUCOMTR-MCNC: 141 MG/DL (ref 70–105)
GLUCOSE BLDC GLUCOMTR-MCNC: 219 MG/DL (ref 70–105)
GLUCOSE SERPL-MCNC: 138 MG/DL (ref 65–99)
HCT VFR BLD AUTO: 35.5 % (ref 37.5–51)
HGB BLD-MCNC: 12.2 G/DL (ref 13–17.7)
LAB AP CASE REPORT: NORMAL
LYMPHOCYTES # BLD AUTO: 1 10*3/MM3 (ref 0.7–3.1)
LYMPHOCYTES NFR BLD AUTO: 13 % (ref 19.6–45.3)
MCH RBC QN AUTO: 30.7 PG (ref 26.6–33)
MCHC RBC AUTO-ENTMCNC: 34.4 G/DL (ref 31.5–35.7)
MCV RBC AUTO: 89.5 FL (ref 79–97)
MONOCYTES # BLD AUTO: 0.7 10*3/MM3 (ref 0.1–0.9)
MONOCYTES NFR BLD AUTO: 9 % (ref 5–12)
NEUTROPHILS NFR BLD AUTO: 5.5 10*3/MM3 (ref 1.7–7)
NEUTROPHILS NFR BLD AUTO: 68.3 % (ref 42.7–76)
NRBC BLD AUTO-RTO: 0.1 /100 WBC (ref 0–0.2)
PATH REPORT.FINAL DX SPEC: NORMAL
PATH REPORT.GROSS SPEC: NORMAL
PLATELET # BLD AUTO: 289 10*3/MM3 (ref 140–450)
PMV BLD AUTO: 7.8 FL (ref 6–12)
POTASSIUM SERPL-SCNC: 4.5 MMOL/L (ref 3.5–5.2)
PROT SERPL-MCNC: 6.3 G/DL (ref 6–8.5)
RBC # BLD AUTO: 3.97 10*6/MM3 (ref 4.14–5.8)
SODIUM SERPL-SCNC: 133 MMOL/L (ref 136–145)
WBC # BLD AUTO: 8.1 10*3/MM3 (ref 3.4–10.8)

## 2020-10-06 PROCEDURE — 94760 N-INVAS EAR/PLS OXIMETRY 1: CPT

## 2020-10-06 PROCEDURE — 25010000002 CEFEPIME PER 500 MG: Performed by: PODIATRIST

## 2020-10-06 PROCEDURE — 82962 GLUCOSE BLOOD TEST: CPT

## 2020-10-06 PROCEDURE — 85025 COMPLETE CBC W/AUTO DIFF WBC: CPT | Performed by: PODIATRIST

## 2020-10-06 PROCEDURE — 25010000002 HYDROMORPHONE PER 4 MG: Performed by: PODIATRIST

## 2020-10-06 PROCEDURE — 94799 UNLISTED PULMONARY SVC/PX: CPT

## 2020-10-06 PROCEDURE — 97162 PT EVAL MOD COMPLEX 30 MIN: CPT

## 2020-10-06 PROCEDURE — 99233 SBSQ HOSP IP/OBS HIGH 50: CPT | Performed by: INTERNAL MEDICINE

## 2020-10-06 PROCEDURE — 80053 COMPREHEN METABOLIC PANEL: CPT | Performed by: INTERNAL MEDICINE

## 2020-10-06 PROCEDURE — 25010000002 ENOXAPARIN PER 10 MG: Performed by: PODIATRIST

## 2020-10-06 RX ORDER — LEVOFLOXACIN 750 MG/1
750 TABLET ORAL EVERY 24 HOURS
Status: DISCONTINUED | OUTPATIENT
Start: 2020-10-06 | End: 2020-10-07 | Stop reason: HOSPADM

## 2020-10-06 RX ADMIN — POTASSIUM CHLORIDE 10 MEQ: 750 TABLET, EXTENDED RELEASE ORAL at 09:04

## 2020-10-06 RX ADMIN — DILTIAZEM HYDROCHLORIDE 120 MG: 120 CAPSULE, COATED, EXTENDED RELEASE ORAL at 09:05

## 2020-10-06 RX ADMIN — DIGOXIN 250 MCG: 250 TABLET ORAL at 10:52

## 2020-10-06 RX ADMIN — POTASSIUM CHLORIDE 10 MEQ: 750 TABLET, EXTENDED RELEASE ORAL at 20:25

## 2020-10-06 RX ADMIN — Medication 10 ML: at 09:05

## 2020-10-06 RX ADMIN — IPRATROPIUM BROMIDE AND ALBUTEROL SULFATE 3 ML: 2.5; .5 SOLUTION RESPIRATORY (INHALATION) at 07:20

## 2020-10-06 RX ADMIN — LOSARTAN POTASSIUM 100 MG: 50 TABLET, FILM COATED ORAL at 09:05

## 2020-10-06 RX ADMIN — METOPROLOL TARTRATE 37.5 MG: 25 TABLET, FILM COATED ORAL at 20:25

## 2020-10-06 RX ADMIN — PREGABALIN 150 MG: 75 CAPSULE ORAL at 20:25

## 2020-10-06 RX ADMIN — HYDROMORPHONE HYDROCHLORIDE 1 MG: 2 INJECTION, SOLUTION INTRAMUSCULAR; INTRAVENOUS; SUBCUTANEOUS at 10:52

## 2020-10-06 RX ADMIN — IPRATROPIUM BROMIDE AND ALBUTEROL SULFATE 3 ML: 2.5; .5 SOLUTION RESPIRATORY (INHALATION) at 15:05

## 2020-10-06 RX ADMIN — CEFEPIME HYDROCHLORIDE 2 G: 2 INJECTION, POWDER, FOR SOLUTION INTRAVENOUS at 15:06

## 2020-10-06 RX ADMIN — ATORVASTATIN CALCIUM 40 MG: 40 TABLET, FILM COATED ORAL at 20:25

## 2020-10-06 RX ADMIN — METOPROLOL TARTRATE 37.5 MG: 25 TABLET, FILM COATED ORAL at 09:04

## 2020-10-06 RX ADMIN — ENOXAPARIN SODIUM 40 MG: 40 INJECTION SUBCUTANEOUS at 15:06

## 2020-10-06 RX ADMIN — HYDROMORPHONE HYDROCHLORIDE 1 MG: 2 INJECTION, SOLUTION INTRAMUSCULAR; INTRAVENOUS; SUBCUTANEOUS at 02:30

## 2020-10-06 RX ADMIN — IPRATROPIUM BROMIDE AND ALBUTEROL SULFATE 3 ML: 2.5; .5 SOLUTION RESPIRATORY (INHALATION) at 11:01

## 2020-10-06 RX ADMIN — HYDROMORPHONE HYDROCHLORIDE 1 MG: 2 INJECTION, SOLUTION INTRAMUSCULAR; INTRAVENOUS; SUBCUTANEOUS at 23:16

## 2020-10-06 RX ADMIN — IPRATROPIUM BROMIDE AND ALBUTEROL SULFATE 3 ML: 2.5; .5 SOLUTION RESPIRATORY (INHALATION) at 01:35

## 2020-10-06 RX ADMIN — IPRATROPIUM BROMIDE AND ALBUTEROL SULFATE 3 ML: 2.5; .5 SOLUTION RESPIRATORY (INHALATION) at 18:54

## 2020-10-06 RX ADMIN — HYDROMORPHONE HYDROCHLORIDE 1 MG: 2 INJECTION, SOLUTION INTRAMUSCULAR; INTRAVENOUS; SUBCUTANEOUS at 15:07

## 2020-10-06 RX ADMIN — CEFEPIME HYDROCHLORIDE 2 G: 2 INJECTION, POWDER, FOR SOLUTION INTRAVENOUS at 09:04

## 2020-10-06 RX ADMIN — Medication 10 ML: at 20:25

## 2020-10-06 RX ADMIN — HYDROMORPHONE HYDROCHLORIDE 1 MG: 2 INJECTION, SOLUTION INTRAMUSCULAR; INTRAVENOUS; SUBCUTANEOUS at 19:26

## 2020-10-06 RX ADMIN — ASPIRIN 81 MG: 81 TABLET, COATED ORAL at 09:04

## 2020-10-06 RX ADMIN — LEVOFLOXACIN 750 MG: 750 TABLET, FILM COATED ORAL at 20:25

## 2020-10-06 RX ADMIN — PREGABALIN 150 MG: 75 CAPSULE ORAL at 09:04

## 2020-10-06 RX ADMIN — HYDROMORPHONE HYDROCHLORIDE 1 MG: 2 INJECTION, SOLUTION INTRAMUSCULAR; INTRAVENOUS; SUBCUTANEOUS at 06:08

## 2020-10-06 NOTE — PROGRESS NOTES
[facility]        LOS:  LOS: 4 days   Patient Name: Ro Nathan  Age/Sex: 75 y.o. male  : 1945  MRN: [id number]    Day of Service: 10/06/20   Length of Stay: 4  Encounter Provider: KOURTNEY Evangelista  Place of Service: [facility] CARDIOLOGY  Patient Care Team:  Yusuf Jones MD as PCP - Joshua Evangelista MD as Consulting Physician (Cardiology)  Deam, Moise Kathleen MD as Consulting Physician (Cardiac Electrophysiology)    Subjective:     Chief Complaint:  F/U CAD    Subjective:   Patient denies pain in right foot post operatively.  He denies chest pain or dyspnea.  No acute cardia events overnight.  Hemodynamically electrically stable.  On stable medications from CV standpoint at this time.  Ambulatory without chest pain    Current Medications:   Scheduled Meds:aspirin, 81 mg, Oral, Daily  atorvastatin, 40 mg, Oral, Nightly  cefepime, 2 g, Intravenous, Q8H  digoxin, 250 mcg, Oral, Daily  dilTIAZem CD, 120 mg, Oral, Daily  enoxaparin, 40 mg, Subcutaneous, Daily  ipratropium-albuterol, 3 mL, Nebulization, 4x Daily - RT  losartan, 100 mg, Oral, Daily  metoprolol tartrate, 37.5 mg, Oral, Q12H  potassium chloride, 10 mEq, Oral, BID  pregabalin, 150 mg, Oral, BID  sodium chloride, 10 mL, Intravenous, Q12H      Continuous Infusions:     Allergies:  Allergies   Allergen Reactions   • Haloperidol Hives   • Morphine Itching   • Pantoprazole Other (See Comments)     Feels sick after receiving       ROS  Negative x14 point review of systems except as mentioned above  Objective:     Temp:  [97 °F (36.1 °C)-97.6 °F (36.4 °C)] 97 °F (36.1 °C)  Heart Rate:  [70-81] 80  Resp:  [15-18] 18  BP: (109-150)/(60-83) 135/71     Intake/Output Summary (Last 24 hours) at 10/6/2020 1127  Last data filed at 10/5/2020 2338  Gross per 24 hour   Intake 700 ml   Output --   Net 700 ml     Body mass index is 33.79 kg/m².      10/03/20  0316 10/04/20  0350  10/05/20  2029   Weight: 113 kg (248 lb 14.4 oz) 113 kg (249 lb 1.9 oz) 113 kg (249 lb 1.9 oz)         Physical Exam:  Neuro:  CV:  Resp:  GI:  Ext:  Tele: AAOx3 no gross focal deficits  S1S2 RRR, no murmur no heave no lift  Non-labored, CTA, no rales rhonchi's or wheezes  BS+, abd soft nontender nondistended  Left pedal pulses palp without edema, right foot wrapped in ACE wrap  V-paced  Normal mood and affect  No acute distress  Agree with physical exam findings assess face-to-face on my encounter with the patient                                                   Lab Review:   Results from last 7 days   Lab Units 10/06/20  0404 10/05/20  0325 10/04/20  0420   SODIUM mmol/L 133* 134* 136   POTASSIUM mmol/L 4.5 4.8 4.5   CHLORIDE mmol/L 100 101 103   CO2 mmol/L 26.0 26.0 25.0   BUN  20 20 18   CREATININE mg/dL 1.08 1.12 1.28*   GLUCOSE mg/dL 138* 140* 171*   CALCIUM mg/dL 9.0 8.9 8.7   AST (SGOT) U/L 30  --  41*   ALT (SGPT) U/L 14  --  14         Results from last 7 days   Lab Units 10/06/20  0404 10/05/20  0325   WBC 10*3/mm3 8.10 8.10   HEMOGLOBIN g/dL 12.2* 12.4*   HEMATOCRIT % 35.5* 36.8*   PLATELETS 10*3/mm3 289 299                   Invalid input(s): LDLCALC            Recent Radiology:  Imaging Results (Most Recent)     Procedure Component Value Units Date/Time    FL C Arm During Surgery [[id number]] Resulted: 10/05/20 1728     Updated: 10/05/20 1729    XR Foot 2 View Right [[id number]] Resulted: 10/05/20 1727     Updated: 10/05/20 1728    NM Bone Scan 3 Phase [[id number]] Collected: 10/05/20 0803     Updated: 10/05/20 0817    Narrative:      DATE OF EXAM:  10/4/2020 10:00 AM     PROCEDURE:  NM BONE SCAN 3 PHASE-     INDICATIONS:  Diabetes mellitus, right foot wound, assess for osteomyelitis.     COMPARISON:  Right foot and ankle x-ray performed on 10/2/2020.     TECHNIQUE:   26.4 mCi of technetium 99m labeled MDP was administered intravenously  and scintigraphic imaging occurred through the feet and ankles in  three  phases per protocol.     FINDINGS:  There is hyperemia to the right foot on the early blood flow images.  There is increased soft tissue uptake within the right foot on the blood  pool images. On the delayed images, there is intense activity seen in  the right midfoot and along the plantar aspect of the lateral right  forefoot. The midfoot activity is probably secondary to degenerative  changes. The lateral forefoot activity is suspicious for osteomyelitis  in the fifth metatarsal bone after reviewing the plain x-rays. There is  uptake seen within the left mid foot. I have no plain films for  comparison, but this appears in a similar distribution to the right foot  and probably represents degenerative changes.        Impression:      Abnormal uptake within the lateral aspect of the right forefoot  suspicious for osteomyelitis in the fifth metatarsal bone. The hyperemia  and increased blood pool uptake to the right foot would indicate  cellulitis.     Electronically Signed By-Yusuf Gray On:10/5/2020 8:08 AM  This report was finalized on [id number] by  Yusuf Gray, .    XR Chest 1 View [[id number]] Collected: 10/02/20 2306     Updated: 10/02/20 2310    Narrative:      DATE OF EXAM:  10/2/2020 10:35 PM     PROCEDURE:  XR CHEST 1 VW-     INDICATIONS:  SOB     COMPARISON:  Chest radiograph 12/8/2019 and 12/3/2019. CT abdomen pelvis 08/04/2020.     TECHNIQUE:   Single radiographic AP view of the chest was obtained.     FINDINGS:  The study is mildly limited by lordotic patient positioning. Overlying  artifacts. Stable left chest wall cardiac pacer device. The lungs are  clear of consolidation. No pneumothorax. Unchanged cardiomediastinal  contours. Partially visualized thoracic spondylosis and degenerative  changes in both shoulders. Multiple old right rib fracture deformities.  No acute osseous abnormality is identified.        Impression:      Mildly limited study demonstrating no active disease.      Electronically Signed By-Shahriar Garcia On:10/2/2020 11:07 PM  This report was finalized on [id number] by  Shahriar Garcia, .    CT Lower Extremity Right Without Contrast [[id number]] Collected: 10/02/20 2105     Updated: 10/02/20 2122    Narrative:         DATE OF EXAM:   10/2/2020 5:24 PM     PROCEDURE:   CT LOWER EXTREMITY RIGHT WO CONTRAST-     INDICATIONS:   Foot swelling, diabetic, osteomyelitis suspected, no prior imaging     COMPARISON:  Radiographs 10-20 20. CT abdomen pelvis 08/04/2020.     TECHNIQUE:   CT of the right ankle and foot was obtained without the administration  of contrast. Coronal and sagittal reformats were obtained. Automated  exposure control and alternative reconstruction methods were used.     FINDINGS:   Motion artifact mildly limits evaluation.     No evidence of acute fracture or dislocation. Partially visualized wound  lateral to the fifth metatarsal head with underlying soft tissue  thickening. No underlying loculated fluid collection to suggest abscess.  Mild diffuse soft tissue edema. No lytic or sclerotic changes are seen  to suggest osteomyelitis.     Well-corticated 6 mm ossification along the anterior distal lateral  malleolus, likely sequela of remote trauma. There is also a 12 mm  ossification lateral to the peroneal tubercle could reflect sequela of  remote trauma or developmental variant.     Small posterior and plantar calcaneal enthesophytes. Mild thickening of  the Achilles tendon. Focal calcification of the thickened proximal  plantar fascia.     Moderate to advanced multifocal arthropathic changes at the midfoot with  likely degenerative cystic changes in the navicular, middle and lateral  cuneiforms, the cuboid, and the bases of the third, fourth, and fifth  metatarsals. Mild DJD at the great toe MTP joint.     Mild thickening of the distal peroneus longus tendon, suggesting  tendinopathy. No cystic or solid soft tissue mass. The sinus tarsi is  unremarkable. The  intermetatarsal spaces are unremarkable.        Impression:         1. Partially visualized wound lateral to the fifth metatarsal head with  underlying soft tissue thickening and diffuse soft tissue edema. No  loculated fluid collection is seen to suggest abscess.  2. Motion degraded study demonstrating no CT evidence of osteomyelitis.  3. Moderate to advanced multifocal arthropathic changes at the midfoot,  likely developing Charcot arthropathy.  4. Thickening of the distal peroneus longus tendon, statistically  tendinopathy, with a chronic ossification lateral to the peroneal  tubercle.  5. Mild Achilles tendinopathy and chronic plantar fasciitis.     Electronically Signed By-Shahriar Garcia On:10/2/2020 9:19 PM  This report was finalized on [id number] by  Shahriar Garcia, .    XR Ankle 3+ View Right [[id number]] Collected: 10/02/20 1230     Updated: 10/02/20 1234    Narrative:      DATE OF EXAM:  10/2/2020 12:07 PM     PROCEDURE:  XR ANKLE 3+ VW RIGHT-     INDICATIONS:  open wound right foot, 5th metatarsal head. Assess for soft tissue  emphysema.     COMPARISON:  Right foot radiographs 11/02/2020     TECHNIQUE:   3 views of the right ankle(s) was/were obtained.     FINDINGS:  No right ankle fracture or joint dislocation is seen. No evidence of  osteomyelitis in the right ankle. No retained radiopaque foreign body.  No subcutaneous gas. Small enthesophytes at the Achilles and plantar  tendon insertions upon the posterior calcaneus.        Impression:         1. Normal right ankle. No acute osseous abnormality or evidence of  osteomyelitis of the right ankle.  2. No  subcutaneous gas is seen.     Electronically Signed By-Dr. Ekaterina Goins MD On:10/2/2020 12:32 PM  This report was finalized on [id number] by Dr. Ekaterina Goins MD.    XR Foot 3+ View Right [[id number]] Collected: 10/02/20 1231     Updated: 10/02/20 1234    Narrative:      DATE OF EXAM:  10/2/2020 12:08 PM     PROCEDURE:  XR FOOT 3+ VW RIGHT-      INDICATIONS:  open wound right foot, 5th metatarsal head     COMPARISON:  Foot radiograph 8/16/2018     TECHNIQUE:   A minimum of three routine standard radiographic views were obtained of  the right foot.     FINDINGS:  There is bandage material along the foot which limits fine osseous  detail. There is soft tissue gas focus at the head of the fifth  metatarsal. There is indistinct margins of the cortex at the lateral  aspect head of the fifth metatarsal. This is consistent with  osteomyelitis given the clinical history. Soft tissue infection is also  likely given the presence of small amount of soft tissue gas. There are  hammertoe deformities with abnormal alignment of the second third fourth  metatarsal phalange joints. There is abnormal alignment at the first  metatarsal phalange joint. There is mild diffuse soft tissue swelling.  There is osteopenia. There is mild arthritis of the tarsometatarsal  joints and talonavicular joint. There is a small plantar calcaneal spur.  There is a small enthesophyte Achilles tendon insertion site.        Impression:      Findings consistent with ostomy myelitis with osseous destruction along  lateral margin of the fifth metatarsal head. Small foci of soft tissue  gas suggest soft tissue infection at this location as well.     Electronically Signed By-Antoinette August MD On:10/2/2020 12:32 PM  This report was finalized on [id number] by  Antoinette August MD.          ECHOCARDIOGRAM:    Results for orders placed during the hospital encounter of 12/03/19   Adult Transthoracic Echo Complete W/ Cont if Necessary Per Protocol    Narrative · Mild mitral valve regurgitation is present  · Mild dilation of the sinuses of Valsalva is present.  · Left ventricular systolic function is hyperdynamic (EF > 70).  · Left atrial cavity size is severely dilated.  · Right ventricular cavity is mildly dilated.  · Left ventricular diastolic dysfunction (grade II) consistent with   pseudonormalization.      Overall normal LV systolic function and size  Normal RV function, mild RV enlargement  Severe left atrial enlargement  Diastolic dysfunction indeterminate by criteria  No masses  No effusion seen  EF hyperdynamic greater than 70%  No significant valvulopathy seen           I reviewed the patient's new clinical results.    EKG:      Assessment:       Diabetic foot ulcer (CMS/HCC)    Osteomyelitis (CMS/HCC)    1. Pre-operative Cardiac Evaluation  - last cath 12/2019   - 2D echo 12/2019 showed EF > 70%, grade 2 diastolic dysfunction, mild MR.  - EKG is V-paced / non-diagnostic; underlying afib     2. R foot wound/Osteomyelitis s/p I&D with biopsy 10/5/20  - podiatry following     3. CAD s/p PCI with MICHAEL to LAD 12/2019   - residual borderline disease of the LCx being treated medically.  - on dual antiplatelet therapy with aspirin and effient, BB, high dose statin at home  - effient has been held perioperatively     4. Afib / PPM  - rate controlled with BB, CCB, and PO dig at home  - not on anticoagulation given hx GIB     5. COPD     6. stage III CKD     7. HTN     8. HLD    Plan:   Patient is POD#1 right foot I&D with biopsy.  Resume effient as soon as safe post-op given PCI with MICHAEL < 1 year ago.  No patient c/o of angina.  Post op tele shows V-pacing.  Stable from CV standpoint  Okay to DC from CV standpoint with restarting of antiplatelet, would perform reloading 60 mg of Effient x1 followed by 10 mg p.o. daily.  In combination with aspirin.    It is a pleasure to be involved in his cardiovascular care.  Please call with any questions or concerns  Joshua Yip MD, PhD      Electronically signed by KOURTNEY Evangelista, 10/06/20, 11:31 AM EDT.

## 2020-10-06 NOTE — PLAN OF CARE
Goal Outcome Evaluation:  Plan of Care Reviewed With: patient  Progress: improving  Outcome Summary: Patient restless through the night with complaints of back and right foot pain. Falls precautions in place. Waiting on final bone biopsy results.

## 2020-10-06 NOTE — PLAN OF CARE
Goal Outcome Evaluation:  Plan of Care Reviewed With: patient  Progress: improving  Outcome Summary: Pt only complaining of back pain during shift, ambulating with post op shoe on and weight bearing to heal. Awaiting biopsy results. Will continue to monitor

## 2020-10-06 NOTE — PROGRESS NOTES
Infectious Diseases Progress Note      LOS: 4 days   Patient Care Team:  Yusuf Jones MD as PCP - General  PihJoshua MD as Consulting Physician (Cardiology)  Deam, Moise Kathleen MD as Consulting Physician (Cardiac Electrophysiology)    Chief Complaint: Right foot wound    Subjective       The patient remained afebrile.  The patient remained hemodynamically stable.  He stating his current antibiotics with no side effects.  He is on room air.  The patient is status post I&D of the right foot earlier today   .  Review of Systems:   Review of Systems   Constitutional: Negative.    HENT: Negative.    Eyes: Negative.    Respiratory: Negative.    Cardiovascular: Negative.    Gastrointestinal: Negative.    Genitourinary: Negative.    Musculoskeletal: Negative.    Skin: Positive for wound.   Neurological: Negative.    Hematological: Negative.    Psychiatric/Behavioral: Negative.         Objective     Vital Signs  Temp:  [97 °F (36.1 °C)-97.8 °F (36.6 °C)] 97.8 °F (36.6 °C)  Heart Rate:  [70-81] 78  Resp:  [16-22] 20  BP: (135-168)/(71-76) 168/73    Physical Exam:  Physical Exam  Vitals signs and nursing note reviewed.   Constitutional:       Appearance: He is well-developed.   HENT:      Head: Normocephalic and atraumatic.   Eyes:      Pupils: Pupils are equal, round, and reactive to light.   Neck:      Musculoskeletal: Normal range of motion and neck supple.   Cardiovascular:      Rate and Rhythm: Normal rate and regular rhythm.      Heart sounds: Normal heart sounds.   Pulmonary:      Effort: Pulmonary effort is normal. No respiratory distress.      Breath sounds: Normal breath sounds. No wheezing or rales.   Abdominal:      General: Bowel sounds are normal. There is no distension.      Palpations: Abdomen is soft. There is no mass.      Tenderness: There is no abdominal tenderness. There is no guarding or rebound.   Musculoskeletal: Normal range of motion.         General: No deformity.      Comments:  Wound present on the lateral aspect of the right foot at the fifth MTP joint site   Skin:     General: Skin is warm.      Findings: No erythema or rash.   Neurological:      Mental Status: He is alert and oriented to person, place, and time.      Cranial Nerves: No cranial nerve deficit.          Results Review:    I have reviewed all clinical data, test, lab, and imaging results.     Radiology  No Radiology Exams Resulted Within Past 24 Hours    Cardiology    Laboratory    Results from last 7 days   Lab Units 10/06/20  0404 10/05/20  0325 10/04/20  0420 10/03/20  0247 10/02/20  2355 10/02/20  1401   WBC 10*3/mm3 8.10 8.10 7.50 7.20 7.30 6.80   HEMOGLOBIN g/dL 12.2* 12.4* 12.7* 12.3* 12.4* 12.2*   HEMATOCRIT % 35.5* 36.8* 37.0* 35.9* 35.9* 35.3*   PLATELETS 10*3/mm3 289 299 330 315 307 323     Results from last 7 days   Lab Units 10/06/20  0404 10/05/20  0325 10/04/20  0420 10/03/20  0247 10/02/20  2355 10/02/20  1401   SODIUM mmol/L 133* 134* 136 137 136 139   POTASSIUM mmol/L 4.5 4.8 4.5 4.5 4.2 4.3   CHLORIDE mmol/L 100 101 103 103 101 102   CO2 mmol/L 26.0 26.0 25.0 26.0 26.0 26.0   BUN  20 20 18 22 24* 26*   CREATININE mg/dL 1.08 1.12 1.28* 1.26 1.20 1.15   GLUCOSE mg/dL 138* 140* 171* 149* 163* 127*   ALBUMIN g/dL 3.30*  --  3.50  --   --   --    BILIRUBIN mg/dL 1.1  --  1.0  --   --   --    ALK PHOS U/L 224*  --  195*  --   --   --    AST (SGOT) U/L 30  --  41*  --   --   --    ALT (SGPT) U/L 14  --  14  --   --   --    CALCIUM mg/dL 9.0 8.9 8.7 8.6 8.7 8.9         Results from last 7 days   Lab Units 10/03/20  0247   SED RATE mm/hr 35*         Microbiology   Microbiology Results (last 10 days)     Procedure Component Value - Date/Time    Tissue / Bone Culture - Tissue, Foot, Right [[id number]] Collected: 10/05/20 1107    Lab Status: Preliminary result Specimen: Tissue from Foot, Right Updated: 10/06/20 0917     Tissue Culture No growth     Gram Stain Few (2+) WBCs per low power field      No organisms seen     AFB Culture - Tissue, Foot, Right [[id number]] Collected: 10/05/20 1107    Lab Status: Preliminary result Specimen: Tissue from Foot, Right Updated: 10/06/20 0936     AFB Stain No acid fast bacilli seen    Blood Culture - Blood, Arm, Right [[id number]] Collected: 10/02/20 2355    Lab Status: Preliminary result Specimen: Blood from Arm, Right Updated: 10/06/20 0015     Blood Culture No growth at 3 days    Blood Culture - Blood, Arm, Left [[id number]] Collected: 10/02/20 2353    Lab Status: Preliminary result Specimen: Blood from Arm, Left Updated: 10/06/20 0015     Blood Culture No growth at 3 days    Wound Culture - Wound, Foot, Right [[id number]]  (Abnormal)  (Susceptibility) Collected: 10/02/20 1329    Lab Status: Final result Specimen: Wound from Foot, Right Updated: 10/05/20 0959     Wound Culture Scant growth (1+) Pseudomonas aeruginosa      Scant growth (1+) Proteus mirabilis      Scant growth (1+) Normal Skin Janeen     Gram Stain Less than 10 WBCs seen      Rare (1+) Gram positive cocci      Rare (1+) Gram negative bacilli    Susceptibility      Pseudomonas aeruginosa     YASMIN     Cefepime Susceptible     Ceftazidime Susceptible     Ciprofloxacin Susceptible     Gentamicin Susceptible     Levofloxacin Susceptible     Piperacillin + Tazobactam Susceptible                Susceptibility      Proteus mirabilis     YASMIN     Ampicillin Susceptible     Ampicillin + Sulbactam Susceptible     Cefepime Susceptible     Ceftazidime Susceptible     Ceftriaxone Susceptible     Gentamicin Susceptible     Levofloxacin Susceptible     Piperacillin + Tazobactam Susceptible     Tetracycline Resistant     Trimethoprim + Sulfamethoxazole Susceptible                Susceptibility Comments     Proteus mirabilis    Cefazolin sensitivity will not be reported for Enterobacteriaceae in non-urine isolates. If cefazolin is preferred, please call the microbiology lab to request an E-test.  With the exception of urinary-sourced infections,  aminoglycosides should not be used as monotherapy.                   Medication Review:       Schedule Meds  aspirin, 81 mg, Oral, Daily  atorvastatin, 40 mg, Oral, Nightly  cefepime, 2 g, Intravenous, Q8H  digoxin, 250 mcg, Oral, Daily  dilTIAZem CD, 120 mg, Oral, Daily  enoxaparin, 40 mg, Subcutaneous, Daily  ipratropium-albuterol, 3 mL, Nebulization, 4x Daily - RT  losartan, 100 mg, Oral, Daily  metoprolol tartrate, 37.5 mg, Oral, Q12H  potassium chloride, 10 mEq, Oral, BID  pregabalin, 150 mg, Oral, BID  sodium chloride, 10 mL, Intravenous, Q12H        Infusion Meds       PRN Meds  HYDROmorphone  •  ipratropium-albuterol  •  oxyCODONE        Assessment/Plan       Antimicrobial Therapy   1.  IV cefepime      day  2.  IV vancomycin      day  3.      Day  4.      Day  5.      Day        Assessment    Right diabetic foot wound.  Initial plain x-ray was suspicious for osteomyelitis however CT scan was negative.  Unable to obtain MRI since patient had a pacemaker placement in the past  Wound culture is growing pseudomonas aeruginosa and Proteus mirabilis  Bone scan finding was suspicious for osteomyelitis of the fifth metatarsal head.  Intraoperative culture is negative so far  The patient is status post I&D of the right foot with bone biopsy on October 5, 2020.  Bone biopsy was negative for acute osteomyelitis  Apparently wound improved significantly since admission.  But will be unlikely to be osteomyelitis giving that the gold standard test which is a bone biopsy was negative.    Patient states that he was just recently told he had diabetes but has not started treatment    CAD stent placement, CHF, A. Fib    Pacemaker placement    History of prostate cancer    Plan    Discontinue IV cefepime  Start levofloxacin 750 mg p.o. daily for 2 weeks  We will consider IV antibiotics for 6 weeks if wound progressed.  Case was discussed with podiatry service  Okay to discharge home from infectious disease point    [facility]  MD Praveen  10/06/20  17:38 EDT      Note is dictated utilizing voice recognition software/Dragon

## 2020-10-06 NOTE — NURSING NOTE
Dr Jones ordered to not turn on bed alarm bc pt was upset over it. Dr Jones said pt will go home and walk so we can let him walk around here

## 2020-10-06 NOTE — PROGRESS NOTES
LOS: 4 days   Patient Care Team:  Yusuf Jones MD as PCP - General  Joshua Yip MD as Consulting Physician (Cardiology)  Deam, Moise Kathleen MD as Consulting Physician (Cardiac Electrophysiology)    Subjective:  No distress    Objective:   Afebrile      Review of Systems:   Review of Systems   Constitutional: Positive for activity change.   Respiratory: Positive for shortness of breath.    Cardiovascular: Negative.    Gastrointestinal: Positive for abdominal distention.   Musculoskeletal: Positive for arthralgias, back pain, gait problem and joint swelling.   Psychiatric/Behavioral: Negative.            Vital Signs  Temp:  [97 °F (36.1 °C)-98.3 °F (36.8 °C)] 97 °F (36.1 °C)  Heart Rate:  [70-81] 81  Resp:  [15-21] 18  BP: (109-166)/(60-84) 143/76    Physical Exam:  Physical Exam  Vitals signs reviewed.   Constitutional:       Appearance: Normal appearance.   Cardiovascular:      Rate and Rhythm: Normal rate.   Pulmonary:      Effort: Pulmonary effort is normal.   Skin:     General: Skin is warm.   Neurological:      General: No focal deficit present.      Mental Status: He is alert and oriented to person, place, and time.          Radiology:  Xr Ankle 3+ View Right    Result Date: 10/2/2020   1. Normal right ankle. No acute osseous abnormality or evidence of osteomyelitis of the right ankle. 2. No  subcutaneous gas is seen.  Electronically Signed By-Dr. Ekaterina Goins MD On:10/2/2020 12:32 PM This report was finalized on [id number] by Dr. Ekaterina Goins MD.    Xr Foot 3+ View Right    Result Date: 10/2/2020  Findings consistent with ostomy myelitis with osseous destruction along lateral margin of the fifth metatarsal head. Small foci of soft tissue gas suggest soft tissue infection at this location as well.  Electronically Signed By-Antoinette August MD On:10/2/2020 12:32 PM This report was finalized on [id number] by  Antoinette August MD.    Nm Bone Scan 3 Phase    Result Date:  10/5/2020  Abnormal uptake within the lateral aspect of the right forefoot suspicious for osteomyelitis in the fifth metatarsal bone. The hyperemia and increased blood pool uptake to the right foot would indicate cellulitis.  Electronically Signed By-Yusuf Gray On:10/5/2020 8:08 AM This report was finalized on [id number] by  Yusuf Gray, .    Xr Chest 1 View    Result Date: 10/2/2020  Mildly limited study demonstrating no active disease.  Electronically Signed By-Shahriar Garcia On:10/2/2020 11:07 PM This report was finalized on [id number] by  Shahriar Garcia, .    Ct Lower Extremity Right Without Contrast    Result Date: 10/2/2020   1. Partially visualized wound lateral to the fifth metatarsal head with underlying soft tissue thickening and diffuse soft tissue edema. No loculated fluid collection is seen to suggest abscess. 2. Motion degraded study demonstrating no CT evidence of osteomyelitis. 3. Moderate to advanced multifocal arthropathic changes at the midfoot, likely developing Charcot arthropathy. 4. Thickening of the distal peroneus longus tendon, statistically tendinopathy, with a chronic ossification lateral to the peroneal tubercle. 5. Mild Achilles tendinopathy and chronic plantar fasciitis.  Electronically Signed By-Shahriar Garcia On:10/2/2020 9:19 PM This report was finalized on [id number] by  Shahriar Garcia, .         Results Review:     I reviewed the patient's new clinical results.  I reviewed the patient's new imaging results and agree with the interpretation.    Medication Review:   Scheduled Meds:aspirin, 81 mg, Oral, Daily  atorvastatin, 40 mg, Oral, Nightly  cefepime, 2 g, Intravenous, Q8H  digoxin, 250 mcg, Oral, Daily  dilTIAZem CD, 120 mg, Oral, Daily  enoxaparin, 40 mg, Subcutaneous, Daily  ipratropium-albuterol, 3 mL, Nebulization, 4x Daily - RT  losartan, 100 mg, Oral, Daily  metoprolol tartrate, 37.5 mg, Oral, Q12H  potassium chloride, 10 mEq, Oral, BID  pregabalin, 150 mg, Oral,  BID  sodium chloride, 10 mL, Intravenous, Q12H      Continuous Infusions:   PRN Meds:.HYDROmorphone  •  ipratropium-albuterol  •  oxyCODONE    Labs:    CBC    Results from last 7 days   Lab Units 10/06/20  0404 10/05/20  0325 10/04/20  0420 10/03/20  0247 10/02/20  2355 10/02/20  1401   WBC 10*3/mm3 8.10 8.10 7.50 7.20 7.30 6.80   HEMOGLOBIN g/dL 12.2* 12.4* 12.7* 12.3* 12.4* 12.2*   PLATELETS 10*3/mm3 289 299 330 315 307 323     BMP   Results from last 7 days   Lab Units 10/06/20  0404 10/05/20  0325 10/04/20  0420 10/03/20  0247 10/02/20  2355 10/02/20  1401   SODIUM mmol/L 133* 134* 136 137 136 139   POTASSIUM mmol/L 4.5 4.8 4.5 4.5 4.2 4.3   CHLORIDE mmol/L 100 101 103 103 101 102   CO2 mmol/L 26.0 26.0 25.0 26.0 26.0 26.0   BUN   --  20 18 22 24* 26*   CREATININE mg/dL 1.08 1.12 1.28* 1.26 1.20 1.15   GLUCOSE mg/dL 138* 140* 171* 149* 163* 127*     Cr Clearance Estimated Creatinine Clearance: 77.9 mL/min (by C-G formula based on SCr of 1.08 mg/dL).  Coag     HbA1C   Lab Results   Component Value Date    HGBA1C 6.6 (H) 10/02/2020    HGBA1C 5.6 08/04/2020     Blood Glucose   Glucose   Date/Time Value Ref Range Status   10/06/2020 0725 117 (H) 70 - 105 mg/dL Final     Comment:     Serial Number: [id number]:  [id number]   10/05/2020 2030 172 (H) 70 - 105 mg/dL Final     Comment:     Serial Number: [id number]:  [id number]   10/05/2020 1646 127 (H) 70 - 105 mg/dL Final     Comment:     Serial Number: [id number]:  [id number]   10/05/2020 1206 109 (H) 70 - 105 mg/dL Final     Comment:     Serial Number: [id number]:  [id number]   10/05/2020 0717 128 (H) 70 - 105 mg/dL Final     Comment:     Serial Number: [id number]:  [id number]   10/04/2020 1957 164 (H) 70 - 105 mg/dL Final     Comment:     Serial Number: [id number]:  [id number]   10/04/2020 1615 119 (H) 70 - 105 mg/dL Final     Comment:     Serial Number: [id number]:  [id number]   10/04/2020 1123 137 (H) 70 - 105 mg/dL Final      Comment:     Serial Number: [id number]:  [id number]     Infection   Results from last 7 days   Lab Units 10/02/20  2355 10/02/20  2353 10/02/20  1329   BLOODCX  No growth at 3 days No growth at 3 days  --    WOUNDCX   --   --  Scant growth (1+) Pseudomonas aeruginosa*  Scant growth (1+) Proteus mirabilis*  Scant growth (1+) Normal Skin Janeen     CMP   Results from last 7 days   Lab Units 10/06/20  0404 10/05/20  0325 10/04/20  0420 10/03/20  0247 10/02/20  2355 10/02/20  1401   SODIUM mmol/L 133* 134* 136 137 136 139   POTASSIUM mmol/L 4.5 4.8 4.5 4.5 4.2 4.3   CHLORIDE mmol/L 100 101 103 103 101 102   CO2 mmol/L 26.0 26.0 25.0 26.0 26.0 26.0   BUN   --  20 18 22 24* 26*   CREATININE mg/dL 1.08 1.12 1.28* 1.26 1.20 1.15   GLUCOSE mg/dL 138* 140* 171* 149* 163* 127*   ALBUMIN g/dL 3.30*  --  3.50  --   --   --    BILIRUBIN mg/dL 1.1  --  1.0  --   --   --    ALK PHOS U/L 224*  --  195*  --   --   --    AST (SGOT) U/L 30  --  41*  --   --   --    ALT (SGPT) U/L 14  --  14  --   --   --      UA      Radiology(recent) Nm Bone Scan 3 Phase    Result Date: 10/5/2020  Abnormal uptake within the lateral aspect of the right forefoot suspicious for osteomyelitis in the fifth metatarsal bone. The hyperemia and increased blood pool uptake to the right foot would indicate cellulitis.  Electronically Signed By-Yusuf Gray On:10/5/2020 8:08 AM This report was finalized on [id number] by  Yusuf Gray, .     Assessment:  Osteomyelitis, clinical  Type 2 diabetes with peripheral neuropathy  Diabetic foot ulcer with exposed bone right foot  Cellulitis of the right foot  Atrial fibrillation/status post pacemaker placement  COPD  History intermittent small bowel bowel obstructions, not a surgical candidate  Opioid dependence  Acute on chronic back pain  Essential hypertension  Alcoholism  History of ventral herniations  Atherosclerotic heart disease of native coronary arteries with angina pectoris    Plan:  Pending  cultures//plans to follow        Yusuf Jones MD  10/06/20  07:32 EDT

## 2020-10-06 NOTE — THERAPY EVALUATION
Patient Name: Ro Nathan  : 1945    MRN: [id number]                              Today's Date: 10/6/2020       Admit Date: 10/2/2020    Visit Dx:     ICD-10-CM ICD-9-CM   1. Diabetic ulcer of other part of right foot associated with type 2 diabetes mellitus, with necrosis of muscle (CMS/HCC)  E11.621 250.80    L97.513 707.15     Patient Active Problem List   Diagnosis   • Skin ulcer (CMS/HCC)   • Chronic low back pain   • DDD (degenerative disc disease), lumbar   • Spondylosis of lumbosacral region   • Atrial fibrillation (CMS/HCC)   • Chronic obstructive pulmonary disease (CMS/HCC)   • Coronary artery disease   • Gastroesophageal reflux disease   • Hyperlipidemia   • Essential hypertension   • Lumbar radiculopathy   • Other hammer toe(s) (acquired), right foot   • Presence of cardiac pacemaker   • Status post coronary artery stent placement   • Lumbar facet joint pain   • COPD exacerbation (CMS/HCC)   • COPD (chronic obstructive pulmonary disease) (CMS/HCC)   • Unstable angina (CMS/HCC)   • Stented coronary artery   • Small bowel obstruction due to adhesions (CMS/HCC)   • Elevated lipoprotein(a)   • Generalized abdominal pain   • Small bowel obstruction (CMS/HCC)   • SBO (small bowel obstruction) (CMS/HCC)   • Osteomyelitis (CMS/HCC)   • Diabetic foot ulcer (CMS/HCC)     Past Medical History:   Diagnosis Date   • Bruises easily    • CHF (congestive heart failure) (CMS/HCC)    • Congenital heart defect    • Difficulty attaining erection    • Hypertension    • Low back pain    • Poor circulation    • Prostate cancer (CMS/HCC)    • Shortness of breath    • Sleep apnea     reports he doesnt use machine   • Stented coronary artery 2019    MICHAEL to LAD     Past Surgical History:   Procedure Laterality Date   • APPENDECTOMY     • CARDIAC CATHETERIZATION N/A 2019    Procedure: Left Heart Cath;  Surgeon: Mirza Munson MD;  Location: [facility] CATH INVASIVE LOC[facility];  Service: Cardiology   •  CARDIAC CATHETERIZATION N/A 12/5/2019    Procedure: Stent MICHAEL coronary;  Surgeon: Mirza Munson MD;  Location: [facility] CATH INVASIVE LOCATION;  Service: Cardiology   • CHOLECYSTECTOMY     • ELBOW PROCEDURE      left   • HERNIA REPAIR     • INTERVENTIONAL RADIOLOGY PROCEDURE N/A 12/5/2019    Procedure: Intravascular Ultrasound;  Surgeon: Mirza Munson MD;  Location: [facility] CATH INVASIVE LOCATION;  Service: Cardiology   • NV RT/LT HEART CATHETERS N/A 12/5/2019    Procedure: Percutaneous Coronary Intervention;  Surgeon: Mirza Munson MD;  Location: [facility] CATH INVASIVE LOCATION;  Service: Cardiology     General Information     Row Name 10/06/20 1147          Physical Therapy Time and Intention    Document Type  evaluation  -     Mode of Treatment  physical therapy  -     Row Name 10/06/20 1147          General Information    Patient Profile Reviewed  yes  -     Prior Level of Function  independent:;dressing;bathing;transfer;gait;bed mobility;all household mobility  -     Row Name 10/06/20 1147          Living Environment    Lives With  spouse  -     Row Name 10/06/20 1147          Home Main Entrance    Number of Stairs, Main Entrance  none  -WC     Stair Railings, Main Entrance  none  -     Row Name 10/06/20 1147          Stairs Within Home, Primary    Number of Stairs, Within Home, Primary  none  -WC     Stair Railings, Within Home, Primary  none  -     Row Name 10/06/20 1147          Cognition    Orientation Status (Cognition)  oriented x 4  -     Row Name 10/06/20 1147          Safety Issues, Functional Mobility    Impairments Affecting Function (Mobility)  endurance/activity tolerance  -       User Key  (r) = Recorded By, (t) = Taken By, (c) = Cosigned By    Initials Name Provider Type    Rosa Isela Plaza PT Physical Therapist        Mobility     Row Name 10/06/20 1148          Bed Mobility    Bed Mobility  rolling right;supine-sit  -     Rolling Right  [place] (Bed Mobility)  modified independence  -     Supine-Sit [place] (Bed Mobility)  modified independence  -     Row Name 10/06/20 1148          Bed-Chair Transfer    Bed-Chair [place] (Transfers)  modified independence  -     Assistive Device (Bed-Chair Transfers)  walker, 4-wheeled  -     Row Name 10/06/20 1148          Sit-Stand Transfer    Sit-Stand [place] (Transfers)  modified independence  -     Assistive Device (Sit-Stand Transfers)  walker, front-wheeled  -     Row Name 10/06/20 1148          Gait/Stairs (Locomotion)    [place] Level (Gait)  modified independence  -     Assistive Device (Gait)  walker, 4-wheeled  -     Stairs, Safety Issues  balance decreased during turns  -       User Key  (r) = Recorded By, (t) = Taken By, (c) = Cosigned By    Initials Name Provider Type    Rosa Isela Plaza, PRAKASH Physical Therapist        Obj/Interventions     Row Name 10/06/20 1149          Range of Motion Comprehensive    General Range of Motion  bilateral lower extremity ROM WFL  -     Comment, General Range of Motion  KELI hips and knees  -     Row Name 10/06/20 1149          Strength Comprehensive (MMT)    General Manual Muscle Testing (MMT) Assessment  lower extremity strength deficits identified  -     Comment, General Manual Muscle Testing (MMT) Assessment  4+/5 grossly KELI hips and knees  -     Row Name 10/06/20 1149          Balance    Balance Assessment  sitting static balance;sitting dynamic balance;standing static balance;standing dynamic balance  -     Static Sitting Balance  WFL  -     Dynamic Sitting Balance  WFL  -     Static Standing Balance  WFL  -     Dynamic Standing Balance  [facility]  -     Balance Interventions  sitting;standing;sit to stand;supported;static;dynamic;moderate challenge  -       User Key  (r) = Recorded By, (t) = Taken By, (c) = Cosigned By    Initials Name Provider Type    Rosa Isela Plaza, PRAKASH Physical Therapist         Goals/Plan     Row Name 10/06/20 1151          Gait Training Goal 1 (PT)    Activity/Assistive Device (Gait Training Goal 1, PT)  gait (walking locomotion);increase endurance/gait distance;walker, rolling WBAT in surgical shoe  -WC     [place] Level (Gait Training Goal 1, PT)  modified independence  -WC     Distance (Gait Training Goal 1, PT)  75 feet  -     Time Frame (Gait Training Goal 1, PT)  short term goal (STG);2 weeks  -     Row Name 10/06/20 1151          Patient Education Goal (PT)    Activity (Patient Education Goal, PT)  LE exercises in sitting  -     [place]/Cues/Accuracy (Memory Goal 2, PT)  demonstrates adequately;verbalizes understanding  -WC     Time Frame (Patient Education Goal, PT)  short term goal (STG);2 weeks  -       User Key  (r) = Recorded By, (t) = Taken By, (c) = Cosigned By    Initials Name Provider Type    WC Rosa Isela Kennedy, PT Physical Therapist        Clinical Impression     Row Name 10/06/20 1150          Pain    Additional Documentation  Pain Scale: FACES Pre/Post-Treatment (Group)  -     Row Name 10/06/20 1150          Pain Scale: FACES Pre/Post-Treatment    Pain: FACES Scale, Pretreatment  2-->hurts little bit  -     Posttreatment Pain Rating  2-->hurts little bit  -WC     Pain Location - Side  Right  -WC     Pain Location  foot  -     Row Name 10/06/20 1150          Plan of Care Review    Plan of Care Reviewed With  patient  -     Progress  improving  -     Row Name 10/06/20 1200 10/06/20 1150       Therapy Assessment/Plan (PT)    Rehab Potential (PT)  --  fair, will monitor progress closely  -    Criteria for Skilled Interventions Met (PT)  yes;skilled treatment is necessary  -  --    Predicted Duration of Therapy Intervention (PT)  --  Until D/C  -    Row Name 10/06/20 1150          Vital Signs    Pre Patient Position  Supine  -WC     Intra Patient Position  Standing  -WC     Post Patient Position  Sitting  -     Row Name 10/06/20 1150           Positioning and Restraints    Pre-Treatment Position  in bed  -     Post Treatment Position  chair  -       User Key  (r) = Recorded By, (t) = Taken By, (c) = Cosigned By    Initials Name Provider Type     Rosa Isela Kennedy PT Physical Therapist        Outcome Measures     Row Name 10/06/20 1153          How much help from another person do you currently need...    Turning from your back to your side while in flat bed without using bedrails?  4  -WC     Moving from lying on back to sitting on the side of a flat bed without bedrails?  4  -WC     Moving to and from a bed to a chair (including a wheelchair)?  4  -WC     Standing up from a chair using your arms (e.g., wheelchair, bedside chair)?  4  -WC     Climbing 3-5 steps with a railing?  3  -WC     To walk in hospital room?  4  -WC     AM-PAC 6 Clicks Score (PT)  23  -     Row Name 10/06/20 1153          Functional Assessment    Outcome Measure Options  AM-PAC 6 Clicks Basic Mobility (PT)  -       User Key  (r) = Recorded By, (t) = Taken By, (c) = Cosigned By    Initials Name Provider Type     Rosa Isela Kennedy PT Physical Therapist        Physical Therapy Education                 Title: PT OT SLP Therapies (Done)     Topic: Physical Therapy (Done)     Point: Mobility training (Done)     Learning Progress Summary           Patient Acceptance, E,TB, VU by  at 10/6/2020 1154    Acceptance, E,TB, VU by  at 10/6/2020 1153                   Point: Body mechanics (Done)     Learning Progress Summary           Patient Acceptance, E,TB, VU by  at 10/6/2020 1154    Acceptance, E,TB, VU by  at 10/6/2020 1153                   Point: Precautions (Done)     Learning Progress Summary           Patient Acceptance, E,TB, VU by  at 10/6/2020 1154    Acceptance, E,TB, VU by  at 10/6/2020 1153                               User Key     Initials Effective Dates Name Provider Type [facility] 01/07/20 -  Rosa Isela Kennedy PT Physical Therapist PT               PT Recommendation and Plan   Pt is  76 yo male osteomyelitis right foot, procedure incision and drainage with bone biopsy of right fifth metatarsal head, WBAT in cast shoe. Prior pt was independent with mobility without AD in the home and community, dressing, bathing, walking. Pt lives with spouse no HOMERO. Pt has a walker. Today pt was alert and oriented x 4. Pt had minimal c/o pain, right foot. Pt was conditional independent with mobility in the bed, sit to stand and stepping 4 feet to chair. Pt is below baseline.     Recommendation is D/C home. With HH PT to follow. PPE: Mask, eyeshield, gloves.     Planned Therapy Interventions (PT): balance training, gait training, home exercise program  Plan of Care Reviewed With: patient  Progress: improving     Time Calculation:   PT Charges     Row Name 10/06/20 1203 10/06/20 1201          Time Calculation    Start Time  --  1129  -     Stop Time  --  1144  -     Time Calculation (min)  --  15 min  -     PT Received On  --  10/06/20  -     PT - Next Appointment  10/08/20  -  10/09/20  -     PT Goal Re-Cert Due Date  --  10/20/20  -       User Key  (r) = Recorded By, (t) = Taken By, (c) = Cosigned By    Initials Name Provider Type     Rosa Isela Kennedy PT Physical Therapist        Therapy Charges for Today     Code Description Service Date Service Provider Modifiers Qty    [id number]  PT EVAL MOD COMPLEXITY 3 10/6/2020 Rosa Isela Kennedy PT GP 1          PT G-Codes  Outcome Measure Options: AM-PAC 6 Clicks Basic Mobility (PT)  AM-PAC 6 Clicks Score (PT): 23    Rosa Isela Kennedy PT  10/6/2020

## 2020-10-06 NOTE — PLAN OF CARE
Problem: Adult Inpatient Plan of Care  Goal: Plan of Care Review  Recent Flowsheet Documentation  Taken 10/6/2020 1150 by Rosa Isela Kennedy, PT  Progress: improving  Plan of Care Reviewed With: patient   Pt is  76 yo male osteomyelitis right foot, procedure incision and drainage with bone biopsy of right fifth metatarsal head, WBAT in cast shoe. Prior pt was independent with mobility without AD in the home and community, dressing, bathing, walking. Pt lives with spouse no HOMERO. Pt has a walker. Today pt was alert and oriented x 4. Pt had minimal c/o pain, right foot. Pt was conditional independent with mobility in the bed, sit to stand and stepping 4 feet to chair. Pt is below baseline Recommendation is D/C home. With HH PT to follow. PPE: Mask, eyeshield, gloves.

## 2020-10-06 NOTE — PROGRESS NOTES
10/06/20   Foot and Ankle Surgery - Inpatient Follow-up  Provider: Dr. Josef Egan, SENA  Location: [facility]    Chief Complaint: Right foot wound    Subjective:  Ro Nathan is a 75 y.o. male postop day 1 from right incision and drainage with bone biopsy of right fifth metatarsal head as well as proximal phalanx of fifth toe.  He states his pain is well controlled and denies nausea, fever, vomiting, chills, shortness of breath, chest pain.  He states he has been walking utilizing his walker but has not been utilizing a postop shoe.  He denies any new issues or complaints.    Allergies   Allergen Reactions   • Haloperidol Hives   • Morphine Itching   • Pantoprazole Other (See Comments)     Feels sick after receiving       No current facility-administered medications on file prior to encounter.      Current Outpatient Medications on File Prior to Encounter   Medication Sig Dispense Refill   • aspirin 81 MG EC tablet Take 1 tablet by mouth Daily. 30 tablet 1   • atorvastatin (LIPITOR) 40 MG tablet Take 1 tablet by mouth Every Night. 30 tablet 1   • digoxin (LANOXIN) 250 MCG tablet Take 250 mcg by mouth Daily.     • diltiaZEM CD (CARDIZEM CD) 120 MG 24 hr capsule Take 120 mg by mouth Daily.     • furosemide (LASIX) 40 MG tablet Take 40 mg by mouth Daily.     • losartan (COZAAR) 100 MG tablet Take 100 mg by mouth Daily.     • LYRICA 150 MG capsule Take 150 mg by mouth 2 (Two) Times a Day.     • metoprolol tartrate (LOPRESSOR) 25 MG tablet Take 1 tablet by mouth Every 12 (Twelve) Hours. (Patient taking differently: Take 25 mg by mouth Every 12 (Twelve) Hours. Verified with [facility] pharmacy) 60 tablet 1   • mirtazapine (REMERON) 30 MG tablet Take 30 mg by mouth Every Night.     • oxyCODONE-acetaminophen (Percocet)  MG per tablet Take 1 tablet by mouth Every 6 (Six) Hours As Needed for Moderate Pain . 120 tablet 0   • potassium chloride (K-DUR) 10 MEQ CR tablet Take 10 mEq by mouth 2 (Two)  "Times a Day.     • prasugrel (EFFIENT) 10 MG tablet Take 10 mg by mouth Daily.     • traZODone (DESYREL) 100 MG tablet Take 100 mg by mouth Every Night.     • VENTOLIN  (90 Base) MCG/ACT inhaler Inhale 2 puffs Every 6 (Six) Hours As Needed for Wheezing or Shortness of Air.         Objective   /71   Pulse 81   Temp 97 °F (36.1 °C) (Oral)   Resp 18   Ht 182.9 cm (72\")   Wt 113 kg (249 lb 1.9 oz)   SpO2 95%   BMI 33.79 kg/m²     General: Alert and oriented x3.  In good spirits.  There is no change in his neurovascular status.  He is able to wiggle his toes without pain or difficulty.  There is no pain to palpation noted to his wound to the lateral aspect of his fifth metatarsal head.  His wound has improved.  The wound now measures 1.0 x 2.0 x 0.4 cm to the level of capsule.  The wound base does have some granulation tissue appreciated diffusely throughout the wound bed.  There is no evidence of any periwound erythema, lymphangitis, purulence, malodor.  There is a small abrasion noted to the medial aspect of his hallux but this appears healthy with no signs of infection appreciated.  The rest of his physical exam is unchanged.      Results from last 7 days   Lab Units 10/06/20  0404   WBC 10*3/mm3 8.10   HEMOGLOBIN g/dL 12.2*   HEMATOCRIT % 35.5*   PLATELETS 10*3/mm3 289       Assessment/Plan     Patient Active Problem List   Diagnosis   • Skin ulcer (CMS/HCC)   • Chronic low back pain   • DDD (degenerative disc disease), lumbar   • Spondylosis of lumbosacral region   • Atrial fibrillation (CMS/HCC)   • Chronic obstructive pulmonary disease (CMS/HCC)   • Coronary artery disease   • Gastroesophageal reflux disease   • Hyperlipidemia   • Essential hypertension   • Lumbar radiculopathy   • Other hammer toe(s) (acquired), right foot   • Presence of cardiac pacemaker   • Status post coronary artery stent placement   • Lumbar facet joint pain   • COPD exacerbation (CMS/HCC)   • COPD (chronic obstructive " pulmonary disease) (CMS/HCC)   • Unstable angina (CMS/HCC)   • Stented coronary artery   • Small bowel obstruction due to adhesions (CMS/HCC)   • Elevated lipoprotein(a)   • Generalized abdominal pain   • Small bowel obstruction (CMS/HCC)   • SBO (small bowel obstruction) (CMS/HCC)   • Osteomyelitis (CMS/HCC)   • Diabetic foot ulcer (CMS/HCC)       -Osteomyelitis, right foot  -Type 2 diabetes mellitus with peripheral neuropathy  -Diabetic foot ulcer with exposed bone, right foot  -Cellulitis of right foot    Continue antibiotics per infectious disease.   Follow bone biopsy results.  I did have a long discussion with the patient regarding his postoperative course.  Since his wound is improving we did discuss the potential role of long-term IV antibiotics.  I explained to him that in the setting of inconclusive imaging studies and depending on what the bone biopsy results come back as this is a viable option for him to potentially save his toe.  I did explain to him, though, that nothing is 100% and whichever route we choose to proceed with he is still at risk for further infection.  Nonetheless, if he decides to proceed with IV antibiotics he will still have to undergo a repeat debridement with application of skin graft.  He is to remain partial weightbearing to his heel in a postop shoe only.  Continue daily Betadine wet-to-dry dressing changes.  This case was discussed with the RN.  Please call with questions.    Josef Egan DPM  [facility]  Foot and Ankle Surgery  10/6/2020  09:58 EDT    Note is dictated utilizing voice recognition software. Unfortunately this leads to occasional typographical errors. I apologize in advance if the situation occurs. If questions occur please do not hesitate to call our office.

## 2020-10-07 VITALS
RESPIRATION RATE: 18 BRPM | BODY MASS INDEX: 33.74 KG/M2 | OXYGEN SATURATION: 95 % | DIASTOLIC BLOOD PRESSURE: 85 MMHG | WEIGHT: 249.12 LBS | SYSTOLIC BLOOD PRESSURE: 154 MMHG | TEMPERATURE: 98.1 F | HEIGHT: 72 IN | HEART RATE: 72 BPM

## 2020-10-07 LAB
ANION GAP SERPL CALCULATED.3IONS-SCNC: 8 MMOL/L (ref 5–15)
BASOPHILS # BLD AUTO: 0.1 10*3/MM3 (ref 0–0.2)
BASOPHILS NFR BLD AUTO: 1.2 % (ref 0–1.5)
BUN SERPL-MCNC: 21 MG/DL (ref 8–23)
BUN SERPL-MCNC: ABNORMAL MG/DL
BUN/CREAT SERPL: ABNORMAL
CALCIUM SPEC-SCNC: 8.8 MG/DL (ref 8.6–10.5)
CHLORIDE SERPL-SCNC: 101 MMOL/L (ref 98–107)
CO2 SERPL-SCNC: 25 MMOL/L (ref 22–29)
CREAT SERPL-MCNC: 1.21 MG/DL (ref 0.76–1.27)
DEPRECATED RDW RBC AUTO: 49.4 FL (ref 37–54)
EOSINOPHIL # BLD AUTO: 0.6 10*3/MM3 (ref 0–0.4)
EOSINOPHIL NFR BLD AUTO: 7.9 % (ref 0.3–6.2)
ERYTHROCYTE [DISTWIDTH] IN BLOOD BY AUTOMATED COUNT: 15.7 % (ref 12.3–15.4)
GFR SERPL CREATININE-BSD FRML MDRD: 59 ML/MIN/1.73
GLUCOSE BLDC GLUCOMTR-MCNC: 135 MG/DL (ref 70–105)
GLUCOSE SERPL-MCNC: 210 MG/DL (ref 65–99)
HCT VFR BLD AUTO: 35.1 % (ref 37.5–51)
HGB BLD-MCNC: 12.2 G/DL (ref 13–17.7)
LYMPHOCYTES # BLD AUTO: 0.9 10*3/MM3 (ref 0.7–3.1)
LYMPHOCYTES NFR BLD AUTO: 12.7 % (ref 19.6–45.3)
MCH RBC QN AUTO: 31.2 PG (ref 26.6–33)
MCHC RBC AUTO-ENTMCNC: 34.8 G/DL (ref 31.5–35.7)
MCV RBC AUTO: 89.7 FL (ref 79–97)
MONOCYTES # BLD AUTO: 0.7 10*3/MM3 (ref 0.1–0.9)
MONOCYTES NFR BLD AUTO: 9.4 % (ref 5–12)
NEUTROPHILS NFR BLD AUTO: 5 10*3/MM3 (ref 1.7–7)
NEUTROPHILS NFR BLD AUTO: 68.8 % (ref 42.7–76)
NRBC BLD AUTO-RTO: 0 /100 WBC (ref 0–0.2)
PLATELET # BLD AUTO: 299 10*3/MM3 (ref 140–450)
PMV BLD AUTO: 7.8 FL (ref 6–12)
POTASSIUM SERPL-SCNC: 4.5 MMOL/L (ref 3.5–5.2)
RBC # BLD AUTO: 3.92 10*6/MM3 (ref 4.14–5.8)
SODIUM SERPL-SCNC: 134 MMOL/L (ref 136–145)
WBC # BLD AUTO: 7.3 10*3/MM3 (ref 3.4–10.8)

## 2020-10-07 PROCEDURE — 99232 SBSQ HOSP IP/OBS MODERATE 35: CPT | Performed by: INTERNAL MEDICINE

## 2020-10-07 PROCEDURE — 94799 UNLISTED PULMONARY SVC/PX: CPT

## 2020-10-07 PROCEDURE — 25010000002 HYDROMORPHONE PER 4 MG: Performed by: PODIATRIST

## 2020-10-07 PROCEDURE — 85025 COMPLETE CBC W/AUTO DIFF WBC: CPT | Performed by: PODIATRIST

## 2020-10-07 PROCEDURE — 80048 BASIC METABOLIC PNL TOTAL CA: CPT | Performed by: PODIATRIST

## 2020-10-07 PROCEDURE — 82962 GLUCOSE BLOOD TEST: CPT

## 2020-10-07 RX ORDER — PRASUGREL 10 MG/1
60 TABLET, FILM COATED ORAL ONCE
Status: COMPLETED | OUTPATIENT
Start: 2020-10-07 | End: 2020-10-07

## 2020-10-07 RX ORDER — PRASUGREL 10 MG/1
10 TABLET, FILM COATED ORAL DAILY
Status: DISCONTINUED | OUTPATIENT
Start: 2020-10-08 | End: 2020-10-07 | Stop reason: HOSPADM

## 2020-10-07 RX ORDER — LEVOFLOXACIN 750 MG/1
750 TABLET ORAL EVERY 24 HOURS
Qty: 13 TABLET | Refills: 0 | Status: SHIPPED | OUTPATIENT
Start: 2020-10-07 | End: 2020-10-20

## 2020-10-07 RX ADMIN — PRASUGREL 60 MG: 10 TABLET, FILM COATED ORAL at 09:11

## 2020-10-07 RX ADMIN — HYDROMORPHONE HYDROCHLORIDE 1 MG: 2 INJECTION, SOLUTION INTRAMUSCULAR; INTRAVENOUS; SUBCUTANEOUS at 04:19

## 2020-10-07 RX ADMIN — IPRATROPIUM BROMIDE AND ALBUTEROL SULFATE 3 ML: 2.5; .5 SOLUTION RESPIRATORY (INHALATION) at 01:05

## 2020-10-07 RX ADMIN — IPRATROPIUM BROMIDE AND ALBUTEROL SULFATE 3 ML: 2.5; .5 SOLUTION RESPIRATORY (INHALATION) at 07:30

## 2020-10-07 NOTE — PROGRESS NOTES
[facility]        LOS:  LOS: 5 days   Patient Name: Ro Nathan  Age/Sex: 75 y.o. male  : 1945  MRN: [id number]    Day of Service: 10/07/20   Length of Stay: 5  Encounter Provider: KOURTNEY Evangelista  Place of Service: [facility] CARDIOLOGY  Patient Care Team:  Yusuf Jones MD as PCP - General  Joshua Yip MD as Consulting Physician (Cardiology)  Deam, Moise Kathleen MD as Consulting Physician (Cardiac Electrophysiology)    Subjective:     Chief Complaint:  F/U CAD    Subjective:   Patient feels well.  He is happy to be going home today.  He denies any chest pain or SOA.  Cardiology as outpatient in 2 to 4 weeks.  Restart Effient today, okay to discharge    Current Medications:   Scheduled Meds:aspirin, 81 mg, Oral, Daily  atorvastatin, 40 mg, Oral, Nightly  digoxin, 250 mcg, Oral, Daily  dilTIAZem CD, 120 mg, Oral, Daily  enoxaparin, 40 mg, Subcutaneous, Daily  ipratropium-albuterol, 3 mL, Nebulization, 4x Daily - RT  levoFLOXacin, 750 mg, Oral, Q24H  losartan, 100 mg, Oral, Daily  metoprolol tartrate, 37.5 mg, Oral, Q12H  potassium chloride, 10 mEq, Oral, BID  pregabalin, 150 mg, Oral, BID  sodium chloride, 10 mL, Intravenous, Q12H      Continuous Infusions:     Allergies:  Allergies   Allergen Reactions   • Haloperidol Hives   • Morphine Itching   • Pantoprazole Other (See Comments)     Feels sick after receiving       ROS    Objective:     Temp:  [97.8 °F (36.6 °C)-98.2 °F (36.8 °C)] 98.1 °F (36.7 °C)  Heart Rate:  [70-86] 70  Resp:  [16-22] 20  BP: (130-168)/(71-85) 154/85     Intake/Output Summary (Last 24 hours) at 10/7/2020 0703  Last data filed at 10/6/2020 1222  Gross per 24 hour   Intake 350 ml   Output --   Net 350 ml     Body mass index is 33.79 kg/m².      10/03/20  0316 10/04/20  0350 10/05/20  2029   Weight: 113 kg (248 lb 14.4 oz) 113 kg (249 lb 1.9 oz) 113 kg (249 lb 1.9 oz)         Physical  Exam:  Neuro:  CV:  Resp:  GI:  Ext:  Tele: AAOx3  S1S2 RRR, no murmur  Non-labored, coarse on expiration  BS+, abd soft  Left pedal pulses palp without edema; right foot in ACE wrap  V-pacing    Agree with physical exam findings assessed on my encounter with the patient                                               Lab Review:   Results from last 7 days   Lab Units 10/07/20  0259 10/06/20  0404 10/05/20  0325 10/04/20  0420   SODIUM mmol/L 134* 133* 134* 136   POTASSIUM mmol/L 4.5 4.5 4.8 4.5   CHLORIDE mmol/L 101 100 101 103   CO2 mmol/L 25.0 26.0 26.0 25.0   BUN   --  20 20 18   CREATININE mg/dL 1.21 1.08 1.12 1.28*   GLUCOSE mg/dL 210* 138* 140* 171*   CALCIUM mg/dL 8.8 9.0 8.9 8.7   AST (SGOT) U/L  --  30  --  41*   ALT (SGPT) U/L  --  14  --  14         Results from last 7 days   Lab Units 10/07/20  0259 10/06/20  0404   WBC 10*3/mm3 7.30 8.10   HEMOGLOBIN g/dL 12.2* 12.2*   HEMATOCRIT % 35.1* 35.5*   PLATELETS 10*3/mm3 299 289                   Invalid input(s): LDLCALC            Recent Radiology:  Imaging Results (Most Recent)     Procedure Component Value Units Date/Time    FL C Arm During Surgery [[id number]] Resulted: 10/05/20 1728     Updated: 10/05/20 1729    XR Foot 2 View Right [[id number]] Resulted: 10/05/20 1727     Updated: 10/05/20 1728    NM Bone Scan 3 Phase [[id number]] Collected: 10/05/20 0803     Updated: 10/05/20 0817    Narrative:      DATE OF EXAM:  10/4/2020 10:00 AM     PROCEDURE:  NM BONE SCAN 3 PHASE-     INDICATIONS:  Diabetes mellitus, right foot wound, assess for osteomyelitis.     COMPARISON:  Right foot and ankle x-ray performed on 10/2/2020.     TECHNIQUE:   26.4 mCi of technetium 99m labeled MDP was administered intravenously  and scintigraphic imaging occurred through the feet and ankles in three  phases per protocol.     FINDINGS:  There is hyperemia to the right foot on the early blood flow images.  There is increased soft tissue uptake within the right foot on the blood  pool  images. On the delayed images, there is intense activity seen in  the right midfoot and along the plantar aspect of the lateral right  forefoot. The midfoot activity is probably secondary to degenerative  changes. The lateral forefoot activity is suspicious for osteomyelitis  in the fifth metatarsal bone after reviewing the plain x-rays. There is  uptake seen within the left mid foot. I have no plain films for  comparison, but this appears in a similar distribution to the right foot  and probably represents degenerative changes.        Impression:      Abnormal uptake within the lateral aspect of the right forefoot  suspicious for osteomyelitis in the fifth metatarsal bone. The hyperemia  and increased blood pool uptake to the right foot would indicate  cellulitis.     Electronically Signed By-Yusuf Gray On:10/5/2020 8:08 AM  This report was finalized on [id number] by  Yusuf Gray, .    XR Chest 1 View [[id number]] Collected: 10/02/20 2306     Updated: 10/02/20 2310    Narrative:      DATE OF EXAM:  10/2/2020 10:35 PM     PROCEDURE:  XR CHEST 1 VW-     INDICATIONS:  SOB     COMPARISON:  Chest radiograph 12/8/2019 and 12/3/2019. CT abdomen pelvis 08/04/2020.     TECHNIQUE:   Single radiographic AP view of the chest was obtained.     FINDINGS:  The study is mildly limited by lordotic patient positioning. Overlying  artifacts. Stable left chest wall cardiac pacer device. The lungs are  clear of consolidation. No pneumothorax. Unchanged cardiomediastinal  contours. Partially visualized thoracic spondylosis and degenerative  changes in both shoulders. Multiple old right rib fracture deformities.  No acute osseous abnormality is identified.        Impression:      Mildly limited study demonstrating no active disease.     Electronically Signed By-Shahriar Garcia On:10/2/2020 11:07 PM  This report was finalized on [id number] by  Shahriar Garcia, .    CT Lower Extremity Right Without Contrast [[id number]] Collected: 10/02/20  2105     Updated: 10/02/20 2122    Narrative:         DATE OF EXAM:   10/2/2020 5:24 PM     PROCEDURE:   CT LOWER EXTREMITY RIGHT WO CONTRAST-     INDICATIONS:   Foot swelling, diabetic, osteomyelitis suspected, no prior imaging     COMPARISON:  Radiographs 10-20 20. CT abdomen pelvis 08/04/2020.     TECHNIQUE:   CT of the right ankle and foot was obtained without the administration  of contrast. Coronal and sagittal reformats were obtained. Automated  exposure control and alternative reconstruction methods were used.     FINDINGS:   Motion artifact mildly limits evaluation.     No evidence of acute fracture or dislocation. Partially visualized wound  lateral to the fifth metatarsal head with underlying soft tissue  thickening. No underlying loculated fluid collection to suggest abscess.  Mild diffuse soft tissue edema. No lytic or sclerotic changes are seen  to suggest osteomyelitis.     Well-corticated 6 mm ossification along the anterior distal lateral  malleolus, likely sequela of remote trauma. There is also a 12 mm  ossification lateral to the peroneal tubercle could reflect sequela of  remote trauma or developmental variant.     Small posterior and plantar calcaneal enthesophytes. Mild thickening of  the Achilles tendon. Focal calcification of the thickened proximal  plantar fascia.     Moderate to advanced multifocal arthropathic changes at the midfoot with  likely degenerative cystic changes in the navicular, middle and lateral  cuneiforms, the cuboid, and the bases of the third, fourth, and fifth  metatarsals. Mild DJD at the great toe MTP joint.     Mild thickening of the distal peroneus longus tendon, suggesting  tendinopathy. No cystic or solid soft tissue mass. The sinus tarsi is  unremarkable. The intermetatarsal spaces are unremarkable.        Impression:         1. Partially visualized wound lateral to the fifth metatarsal head with  underlying soft tissue thickening and diffuse soft tissue edema.  No  loculated fluid collection is seen to suggest abscess.  2. Motion degraded study demonstrating no CT evidence of osteomyelitis.  3. Moderate to advanced multifocal arthropathic changes at the midfoot,  likely developing Charcot arthropathy.  4. Thickening of the distal peroneus longus tendon, statistically  tendinopathy, with a chronic ossification lateral to the peroneal  tubercle.  5. Mild Achilles tendinopathy and chronic plantar fasciitis.     Electronically Signed By-Shahriar Garcia On:10/2/2020 9:19 PM  This report was finalized on [id number] by  Shahriar Garcia, .    XR Ankle 3+ View Right [[id number]] Collected: 10/02/20 1230     Updated: 10/02/20 1234    Narrative:      DATE OF EXAM:  10/2/2020 12:07 PM     PROCEDURE:  XR ANKLE 3+ VW RIGHT-     INDICATIONS:  open wound right foot, 5th metatarsal head. Assess for soft tissue  emphysema.     COMPARISON:  Right foot radiographs 11/02/2020     TECHNIQUE:   3 views of the right ankle(s) was/were obtained.     FINDINGS:  No right ankle fracture or joint dislocation is seen. No evidence of  osteomyelitis in the right ankle. No retained radiopaque foreign body.  No subcutaneous gas. Small enthesophytes at the Achilles and plantar  tendon insertions upon the posterior calcaneus.        Impression:         1. Normal right ankle. No acute osseous abnormality or evidence of  osteomyelitis of the right ankle.  2. No  subcutaneous gas is seen.     Electronically Signed By-Dr. Ekaterina Goins MD On:10/2/2020 12:32 PM  This report was finalized on [id number] by Dr. Ekaterina Goins MD.    XR Foot 3+ View Right [[id number]] Collected: 10/02/20 1231     Updated: 10/02/20 1234    Narrative:      DATE OF EXAM:  10/2/2020 12:08 PM     PROCEDURE:  XR FOOT 3+ VW RIGHT-     INDICATIONS:  open wound right foot, 5th metatarsal head     COMPARISON:  Foot radiograph 8/16/2018     TECHNIQUE:   A minimum of three routine standard radiographic views were obtained of  the right foot.      FINDINGS:  There is bandage material along the foot which limits fine osseous  detail. There is soft tissue gas focus at the head of the fifth  metatarsal. There is indistinct margins of the cortex at the lateral  aspect head of the fifth metatarsal. This is consistent with  osteomyelitis given the clinical history. Soft tissue infection is also  likely given the presence of small amount of soft tissue gas. There are  hammertoe deformities with abnormal alignment of the second third fourth  metatarsal phalange joints. There is abnormal alignment at the first  metatarsal phalange joint. There is mild diffuse soft tissue swelling.  There is osteopenia. There is mild arthritis of the tarsometatarsal  joints and talonavicular joint. There is a small plantar calcaneal spur.  There is a small enthesophyte Achilles tendon insertion site.        Impression:      Findings consistent with ostomy myelitis with osseous destruction along  lateral margin of the fifth metatarsal head. Small foci of soft tissue  gas suggest soft tissue infection at this location as well.     Electronically Signed By-Antoinette August MD On:10/2/2020 12:32 PM  This report was finalized on [id number] by  Antoinette August MD.          ECHOCARDIOGRAM:    Results for orders placed during the hospital encounter of 12/03/19   Adult Transthoracic Echo Complete W/ Cont if Necessary Per Protocol    Narrative · Mild mitral valve regurgitation is present  · Mild dilation of the sinuses of Valsalva is present.  · Left ventricular systolic function is hyperdynamic (EF > 70).  · Left atrial cavity size is severely dilated.  · Right ventricular cavity is mildly dilated.  · Left ventricular diastolic dysfunction (grade II) consistent with   pseudonormalization.     Overall normal LV systolic function and size  Normal RV function, mild RV enlargement  Severe left atrial enlargement  Diastolic dysfunction indeterminate by criteria  No masses  No effusion seen  EF  hyperdynamic greater than 70%  No significant valvulopathy seen           I reviewed the patient's new clinical results.    EKG:      Assessment:       Diabetic foot ulcer (CMS/HCC)    Osteomyelitis (CMS/HCC)    1. Pre-operative Cardiac Evaluation  - last cath 12/2019   - 2D echo 12/2019 showed EF > 70%, grade 2 diastolic dysfunction, mild MR.  - EKG is V-paced / non-diagnostic; underlying afib     2. R foot wound s/p I&D with biopsy 10/5/20  - podiatry and ID following  - biopsy negative for osteomyelitis     3. CAD s/p PCI with MICHAEL to LAD 12/2019   - residual borderline disease of the LCx being treated medically.  - on dual antiplatelet therapy with aspirin and effient, BB, high dose statin at home  - effient has been held perioperatively     4. Afib / PPM  - rate controlled with BB, CCB, and PO dig at home  - not on anticoagulation given hx GIB     5. COPD     6. stage III CKD     7. HTN     8. HLD    Plan:   Patient is POD#2 right foot I&D.  Biopsy was negative for osteomyelitis, and patient is anticipated for discharged home today.  Okay to resume Effient.  F/U with Dr. Watson on 11/23/20.  F/U with Dr. Yip on 11/11 at 1:45 pm.       Addendum: podiatry is okay for us to restart effient.  Patient will receive 60 mg PO loading dose today and resume 10 mg PO daily tomorrow.  Outpatient follow-up with cardiology

## 2020-10-07 NOTE — PROGRESS NOTES
Continued Stay Note  [facility]     Patient Name: Ro Nathan  MRN: [id number]  Today's Date: 10/7/2020    Admit Date: 10/2/2020    Discharge Plan     Row Name 10/07/20 1043       Plan    Plan  Home with spouse, declined home health. antibiotics are po.    Final Discharge Disposition Code  01 - home or self-care               Expected Discharge Date and Time     Expected Discharge Date Expected Discharge Time    Oct 7, 2020             Margy Serrano RN

## 2020-10-07 NOTE — DISCHARGE SUMMARY
Date of Discharge:  10/7/2020  Discharge Diagnosis:       Type 2 diabetes with peripheral neuropathy  Diabetic foot ulcer with exposed bone right foot  Cellulitis of the right foot  Atrial fibrillation/status post pacemaker placement  COPD  History intermittent small bowel bowel obstructions, not a surgical candidate  Opioid dependence  Acute on chronic back pain  Essential hypertension  Alcoholism  History of ventral herniations  Atherosclerotic heart disease of native coronary arteries with angina pectoris    Presenting Problem/History of Present Illness  Active Hospital Problems    Diagnosis  POA   • **Diabetic foot ulcer (CMS/[facility]) [E11.621, L97.509]  Yes   • Osteomyelitis (CMS/Formerly [facility]) [M86.9]  Yes      Resolved Hospital Problems   No resolved problems to display.          Hospital Course  Patient is a 75 y.o. male presented with an acute right diabetic foot ulcer with exposed bone.  He was seen in consultation by podiatry and infectious disease.  He went to the operating room where he had an incision and drainage with debridement of all nonviable soft tissue and bone with bone biopsy was performed.  Parenteral antimicrobial therapy was initiated and he improved overall.  Bone biopsy failed to reveal evidence of acute osteomyelitis and secondary to improvement, he was deemed stable for discharge home today to begin an outpatient course of oral fluoroquinolone therapy.  He will follow-up with podiatry weekly for the next several weeks and will follow up with us within 2 weeks time for reevaluation.  He will be sent home on a calorie restricted ADA diet and will continue to avoid cigarette smoke and to begin a more healthy lifestyle.  He will be encouraged to avoid alcohol and to call with any questions or concerns and to notify us if he experiences any decompensation.  He is aware that he may need skin grafting.  There were no other complications throughout the patient's hospital stay.  Discharge prognosis  fair    Procedures Performed    Procedure(s):  Incision and drainage with debridement of all nonviable soft tissue and bone with bone biopsy, right foot.  -------------------       Consults:   Consults     Date and Time Order Name Status Description    10/3/2020 0840 Inpatient Cardiology Consult Completed     10/2/2020 2219 Inpatient Podiatry Consult      10/2/2020 1544 Inpatient Podiatry Consult Completed     10/2/2020 1326 Inpatient Vascular Surgery Consult Completed     10/2/2020 1326 Inpatient Infectious Diseases Consult Completed           Pertinent Test Results:Xr Ankle 3+ View Right    Result Date: 10/2/2020   1. Normal right ankle. No acute osseous abnormality or evidence of osteomyelitis of the right ankle. 2. No  subcutaneous gas is seen.  Electronically Signed By-Dr. Ekaterina Goins MD On:10/2/2020 12:32 PM This report was finalized on [id number] by Dr. Ekaterina Goins MD.    Xr Foot 3+ View Right    Result Date: 10/2/2020  Findings consistent with ostomy myelitis with osseous destruction along lateral margin of the fifth metatarsal head. Small foci of soft tissue gas suggest soft tissue infection at this location as well.  Electronically Signed By-Antoinette August MD On:10/2/2020 12:32 PM This report was finalized on [id number] by  Antoinette August MD.    Nm Bone Scan 3 Phase    Result Date: 10/5/2020  Abnormal uptake within the lateral aspect of the right forefoot suspicious for osteomyelitis in the fifth metatarsal bone. The hyperemia and increased blood pool uptake to the right foot would indicate cellulitis.  Electronically Signed By-Yusuf Gray On:10/5/2020 8:08 AM This report was finalized on [id number] by  Yusuf Gray, .    Xr Chest 1 View    Result Date: 10/2/2020  Mildly limited study demonstrating no active disease.  Electronically Signed By-Shahriar Garcia On:10/2/2020 11:07 PM This report was finalized on [id number] by  Shahriar Garcia, .    Ct Lower Extremity Right Without Contrast    Result Date:  10/2/2020   1. Partially visualized wound lateral to the fifth metatarsal head with underlying soft tissue thickening and diffuse soft tissue edema. No loculated fluid collection is seen to suggest abscess. 2. Motion degraded study demonstrating no CT evidence of osteomyelitis. 3. Moderate to advanced multifocal arthropathic changes at the midfoot, likely developing Charcot arthropathy. 4. Thickening of the distal peroneus longus tendon, statistically tendinopathy, with a chronic ossification lateral to the peroneal tubercle. 5. Mild Achilles tendinopathy and chronic plantar fasciitis.  Electronically Signed By-Shahriar Garcia On:10/2/2020 9:19 PM This report was finalized on [id number] by  Shahriar Garcia, .      Imaging Results (Last 7 Days)     Procedure Component Value Units Date/Time    FL C Arm During Surgery [[id number]] Resulted: 10/05/20 1728     Updated: 10/05/20 1729    XR Foot 2 View Right [[id number]] Resulted: 10/05/20 1727     Updated: 10/05/20 1728    NM Bone Scan 3 Phase [[id number]] Collected: 10/05/20 0803     Updated: 10/05/20 0817    Narrative:      DATE OF EXAM:  10/4/2020 10:00 AM     PROCEDURE:  NM BONE SCAN 3 PHASE-     INDICATIONS:  Diabetes mellitus, right foot wound, assess for osteomyelitis.     COMPARISON:  Right foot and ankle x-ray performed on 10/2/2020.     TECHNIQUE:   26.4 mCi of technetium 99m labeled MDP was administered intravenously  and scintigraphic imaging occurred through the feet and ankles in three  phases per protocol.     FINDINGS:  There is hyperemia to the right foot on the early blood flow images.  There is increased soft tissue uptake within the right foot on the blood  pool images. On the delayed images, there is intense activity seen in  the right midfoot and along the plantar aspect of the lateral right  forefoot. The midfoot activity is probably secondary to degenerative  changes. The lateral forefoot activity is suspicious for osteomyelitis  in the fifth metatarsal  bone after reviewing the plain x-rays. There is  uptake seen within the left mid foot. I have no plain films for  comparison, but this appears in a similar distribution to the right foot  and probably represents degenerative changes.        Impression:      Abnormal uptake within the lateral aspect of the right forefoot  suspicious for osteomyelitis in the fifth metatarsal bone. The hyperemia  and increased blood pool uptake to the right foot would indicate  cellulitis.     Electronically Signed By-Yusuf Gray On:10/5/2020 8:08 AM  This report was finalized on [id number] by  Yusuf Gray, .    XR Chest 1 View [[id number]] Collected: 10/02/20 2306     Updated: 10/02/20 2310    Narrative:      DATE OF EXAM:  10/2/2020 10:35 PM     PROCEDURE:  XR CHEST 1 VW-     INDICATIONS:  SOB     COMPARISON:  Chest radiograph 12/8/2019 and 12/3/2019. CT abdomen pelvis 08/04/2020.     TECHNIQUE:   Single radiographic AP view of the chest was obtained.     FINDINGS:  The study is mildly limited by lordotic patient positioning. Overlying  artifacts. Stable left chest wall cardiac pacer device. The lungs are  clear of consolidation. No pneumothorax. Unchanged cardiomediastinal  contours. Partially visualized thoracic spondylosis and degenerative  changes in both shoulders. Multiple old right rib fracture deformities.  No acute osseous abnormality is identified.        Impression:      Mildly limited study demonstrating no active disease.     Electronically Signed By-Shahriar Garcia On:10/2/2020 11:07 PM  This report was finalized on [id number] by  Shahriar Garcia, .    CT Lower Extremity Right Without Contrast [[id number]] Collected: 10/02/20 2105     Updated: 10/02/20 2122    Narrative:         DATE OF EXAM:   10/2/2020 5:24 PM     PROCEDURE:   CT LOWER EXTREMITY RIGHT WO CONTRAST-     INDICATIONS:   Foot swelling, diabetic, osteomyelitis suspected, no prior imaging     COMPARISON:  Radiographs 10-20 20. CT abdomen pelvis 08/04/2020.      TECHNIQUE:   CT of the right ankle and foot was obtained without the administration  of contrast. Coronal and sagittal reformats were obtained. Automated  exposure control and alternative reconstruction methods were used.     FINDINGS:   Motion artifact mildly limits evaluation.     No evidence of acute fracture or dislocation. Partially visualized wound  lateral to the fifth metatarsal head with underlying soft tissue  thickening. No underlying loculated fluid collection to suggest abscess.  Mild diffuse soft tissue edema. No lytic or sclerotic changes are seen  to suggest osteomyelitis.     Well-corticated 6 mm ossification along the anterior distal lateral  malleolus, likely sequela of remote trauma. There is also a 12 mm  ossification lateral to the peroneal tubercle could reflect sequela of  remote trauma or developmental variant.     Small posterior and plantar calcaneal enthesophytes. Mild thickening of  the Achilles tendon. Focal calcification of the thickened proximal  plantar fascia.     Moderate to advanced multifocal arthropathic changes at the midfoot with  likely degenerative cystic changes in the navicular, middle and lateral  cuneiforms, the cuboid, and the bases of the third, fourth, and fifth  metatarsals. Mild DJD at the great toe MTP joint.     Mild thickening of the distal peroneus longus tendon, suggesting  tendinopathy. No cystic or solid soft tissue mass. The sinus tarsi is  unremarkable. The intermetatarsal spaces are unremarkable.        Impression:         1. Partially visualized wound lateral to the fifth metatarsal head with  underlying soft tissue thickening and diffuse soft tissue edema. No  loculated fluid collection is seen to suggest abscess.  2. Motion degraded study demonstrating no CT evidence of osteomyelitis.  3. Moderate to advanced multifocal arthropathic changes at the midfoot,  likely developing Charcot arthropathy.  4. Thickening of the distal peroneus longus tendon,  statistically  tendinopathy, with a chronic ossification lateral to the peroneal  tubercle.  5. Mild Achilles tendinopathy and chronic plantar fasciitis.     Electronically Signed By-Shahriar Garcia On:10/2/2020 9:19 PM  This report was finalized on [id number] by  Shahriar Garcia, .    XR Ankle 3+ View Right [[id number]] Collected: 10/02/20 1230     Updated: 10/02/20 1234    Narrative:      DATE OF EXAM:  10/2/2020 12:07 PM     PROCEDURE:  XR ANKLE 3+ VW RIGHT-     INDICATIONS:  open wound right foot, 5th metatarsal head. Assess for soft tissue  emphysema.     COMPARISON:  Right foot radiographs 11/02/2020     TECHNIQUE:   3 views of the right ankle(s) was/were obtained.     FINDINGS:  No right ankle fracture or joint dislocation is seen. No evidence of  osteomyelitis in the right ankle. No retained radiopaque foreign body.  No subcutaneous gas. Small enthesophytes at the Achilles and plantar  tendon insertions upon the posterior calcaneus.        Impression:         1. Normal right ankle. No acute osseous abnormality or evidence of  osteomyelitis of the right ankle.  2. No  subcutaneous gas is seen.     Electronically Signed By-Dr. Ekaterina Goins MD On:10/2/2020 12:32 PM  This report was finalized on [id number] by Dr. Ekaterina Goins MD.    XR Foot 3+ View Right [[id number]] Collected: 10/02/20 1231     Updated: 10/02/20 1234    Narrative:      DATE OF EXAM:  10/2/2020 12:08 PM     PROCEDURE:  XR FOOT 3+ VW RIGHT-     INDICATIONS:  open wound right foot, 5th metatarsal head     COMPARISON:  Foot radiograph 8/16/2018     TECHNIQUE:   A minimum of three routine standard radiographic views were obtained of  the right foot.     FINDINGS:  There is bandage material along the foot which limits fine osseous  detail. There is soft tissue gas focus at the head of the fifth  metatarsal. There is indistinct margins of the cortex at the lateral  aspect head of the fifth metatarsal. This is consistent with  osteomyelitis given the  clinical history. Soft tissue infection is also  likely given the presence of small amount of soft tissue gas. There are  hammertoe deformities with abnormal alignment of the second third fourth  metatarsal phalange joints. There is abnormal alignment at the first  metatarsal phalange joint. There is mild diffuse soft tissue swelling.  There is osteopenia. There is mild arthritis of the tarsometatarsal  joints and talonavicular joint. There is a small plantar calcaneal spur.  There is a small enthesophyte Achilles tendon insertion site.        Impression:      Findings consistent with ostomy myelitis with osseous destruction along  lateral margin of the fifth metatarsal head. Small foci of soft tissue  gas suggest soft tissue infection at this location as well.     Electronically Signed By-Antoinette August MD On:10/2/2020 12:32 PM  This report was finalized on [id number] by  Antoinette August MD.              Condition on Discharge:  Fair    Vital Signs  Temp:  [97.8 °F (36.6 °C)-98.2 °F (36.8 °C)] 98.1 °F (36.7 °C)  Heart Rate:  [70-86] 72  Resp:  [16-22] 18  BP: (130-168)/(71-85) 154/85    Physical Exam:     General Appearance:    Alert, cooperative, in no acute distress   Head:    Normocephalic, without obvious abnormality, atraumatic   Eyes:           Conjunctivae and sclerae normal, no   icterus, no pallor, corneas clear, PERRLA   Throat:   No oral lesions, no thrush, oral mucosa moist   Neck:   No adenopathy, supple, trachea midline, no thyromegaly, no   carotid bruit, no JVD   Lungs:     Clear to auscultation,respirations regular, even and                  unlabored    Heart:    Regular rhythm and normal rate, normal S1 and S2, no            murmur, no gallop, no rub, no click   Chest Wall:    No abnormalities observed   Abdomen:     Normal bowel sounds, no masses, no organomegaly, soft        non-tender, non-distended, no guarding, no rebound                tenderness   Rectal:     Deferred   Extremities:   Moves  all extremities well, no edema, no cyanosis, no             redness   Pulses:   Pulses palpable and equal bilaterally   Skin:   No bleeding, bruising or rash   Lymph nodes:   No palpable adenopathy   Neurologic:   Cranial nerves 2 - 12 grossly intact, sensation intact, DTR       present and equal bilaterally         Discharge Disposition  Home or Self Care    Discharge Medications     Discharge Medications      New Medications      Instructions Start Date   levoFLOXacin 750 MG tablet  Commonly known as: LEVAQUIN   750 mg, Oral, Every 24 Hours         Changes to Medications      Instructions Start Date   metoprolol tartrate 25 MG tablet  Commonly known as: LOPRESSOR  What changed: additional instructions   25 mg, Oral, Every 12 Hours Scheduled         Continue These Medications      Instructions Start Date   aspirin 81 MG EC tablet   81 mg, Oral, Daily      atorvastatin 40 MG tablet  Commonly known as: LIPITOR   40 mg, Oral, Nightly      digoxin 250 MCG tablet  Commonly known as: LANOXIN   250 mcg, Oral, Daily Digoxin      dilTIAZem  MG 24 hr capsule  Commonly known as: CARDIZEM CD   120 mg, Oral, Daily      furosemide 40 MG tablet  Commonly known as: LASIX   40 mg, Oral, Daily      losartan 100 MG tablet  Commonly known as: COZAAR   100 mg, Oral, Daily      Lyrica 150 MG capsule  Generic drug: pregabalin   150 mg, Oral, 2 Times Daily      mirtazapine 30 MG tablet  Commonly known as: REMERON   30 mg, Oral, Nightly      oxyCODONE-acetaminophen  MG per tablet  Commonly known as: Percocet   1 tablet, Oral, Every 6 Hours PRN      potassium chloride 10 MEQ CR tablet   10 mEq, Oral, 2 Times Daily      prasugrel 10 MG tablet  Commonly known as: EFFIENT   10 mg, Oral, Daily      traZODone 100 MG tablet  Commonly known as: DESYREL   100 mg, Oral, Nightly      Ventolin  (90 Base) MCG/ACT inhaler  Generic drug: albuterol sulfate HFA   2 puffs, Inhalation, Every 6 Hours PRN             Discharge Diet:   ADA  1800-calorie  Activity at Discharge:   As tolerated  Follow-up Appointments  Dr. Egan this coming week  Dr. Wheeler in 2 weeks  Future Appointments   Date Time Provider Department Center   10/23/2020  2:30 PM Herberth Oconnor MD [facility]   11/23/2020  8:30 AM Deam, Moise Kathleen MD [facility]         Test Results Pending at Discharge  Pending Labs     Order Current Status    Anaerobic Culture - Tissue, Foot, Right In process    Fungus Culture - Tissue, Foot, Right In process    AFB Culture - Tissue, Foot, Right Preliminary result    Blood Culture - Blood, Arm, Left Preliminary result    Blood Culture - Blood, Arm, Right Preliminary result    Tissue / Bone Culture - Tissue, Foot, Right Preliminary result           Yusuf Jones MD  10/07/20  07:51 EDT

## 2020-10-07 NOTE — PLAN OF CARE
Continue to monitor and assess pt for pain.  Will be D/C pt.  Problem: Adult Inpatient Plan of Care  Goal: Plan of Care Review  Outcome: Ongoing, Progressing  Flowsheets  Taken 10/7/2020 0424 by Lesly Waldron RN  Progress: improving  Outcome Summary: Patient rested well through the night. No complaints of right foot pain but continues to request iv dilaudid for chronic back pain. Ambulating with post op shoe and weight on heel.  Taken 10/6/2020 1150 by Rosa Isela Kennedy, PRAKASH  Plan of Care Reviewed With: patient  Goal: Patient-Specific Goal (Individualized)  Outcome: Ongoing, Progressing  Goal: Absence of Hospital-Acquired Illness or Injury  Outcome: Ongoing, Progressing  Intervention: Identify and Manage Fall Risk  Flowsheets (Taken 10/7/2020 0731)  Safety Promotion/Fall Prevention:   assistive device/personal items within reach   clutter free environment maintained  Intervention: Prevent Infection  Recent Flowsheet Documentation  Taken 10/7/2020 0731 by Gisel Fox RN  Infection Prevention: personal protective equipment utilized  Goal: Optimal Comfort and Wellbeing  Outcome: Ongoing, Progressing  Goal: Readiness for Transition of Care  Outcome: Ongoing, Progressing  Intervention: Mutually Develop Transition Plan  Flowsheets  Taken 10/7/2020 0815 by Gisel Fox RN  Transportation Concerns: car, none  Taken 10/5/2020 1709 by Margy Serrano RN  Discharge Coordination/Progress: DC Plan: from home w/spouse. Surgery 10/5, IV antibiotics, await cultures per ID.  Equipment Currently Used at Home:   walker, rolling   crutches  Anticipated Changes Related to Illness: none  Concerns Comments: Surgery 10/5 waiting on cultures - IV antibiotics.  Transportation Anticipated: family or friend will provide  Outpatient/Agency/Support Group Needs: homecare agency  Concerns to be Addressed: denies needs/concerns at this time  Readmission Within the Last 30 Days: no previous admission in last 30 days  Patient/Family  Anticipated Services at Transition: none  Patient/Family Anticipates Transition to: home with family     Problem: Fall Injury Risk  Goal: Absence of Fall and Fall-Related Injury  Outcome: Ongoing, Progressing  Intervention: Identify and Manage Contributors to Fall Injury Risk  Flowsheets (Taken 10/6/2020 1901 by Lesly Waldron, RN)  Medication Review/Management: medications reviewed  Intervention: Promote Injury-Free Environment  Recent Flowsheet Documentation  Taken 10/7/2020 0731 by Gisel Fox RN  Safety Promotion/Fall Prevention:   assistive device/personal items within reach   clutter free environment maintained     Problem: Pain Chronic (Persistent) (Comorbidity Management)  Goal: Acceptable Pain Control and Functional Ability  Outcome: Ongoing, Progressing  Goal: Acceptable Pain Control and Functional Ability  Outcome: Ongoing, Progressing  Intervention: Develop Pain Management Plan  Flowsheets (Taken 10/5/2020 1547 by Shivani Duran, RN)  Pain Management Interventions: no interventions per patient request     Problem: Asthma Comorbidity  Goal: Maintenance of Asthma Control  Outcome: Ongoing, Progressing  Intervention: Maintain Asthma Symptom Control  Flowsheets (Taken 10/6/2020 1901 by Lesly Waldron, RN)  Medication Review/Management: medications reviewed     Problem: COPD Comorbidity  Goal: Maintenance of COPD Symptom Control  Outcome: Ongoing, Progressing  Intervention: Maintain COPD-Symptom Control  Flowsheets (Taken 10/6/2020 1901 by Lesly Waldron, RN)  Medication Review/Management: medications reviewed     Problem: Diabetes Comorbidity  Goal: Blood Glucose Level Within Desired Range  Outcome: Ongoing, Progressing  Intervention: Maintain Glycemic Control  Flowsheets (Taken 10/6/2020 1901 by Lesly Waldron, RN)  Glycemic Management: blood glucose monitoring     Problem: Heart Failure Comorbidity  Goal: Maintenance of Heart Failure Symptom Control  Outcome: Ongoing, Progressing  Intervention:  Maintain Heart Failure-Management Strategies  Flowsheets (Taken 10/6/2020 1901 by Lesly Waldron, RN)  Medication Review/Management: medications reviewed     Problem: Hypertension Comorbidity  Goal: Blood Pressure in Desired Range  Outcome: Ongoing, Progressing  Intervention: Maintain Hypertension-Management Strategies  Flowsheets (Taken 10/6/2020 1901 by Lesly Waldron, RN)  Medication Review/Management: medications reviewed     Problem: Obstructive Sleep Apnea Risk or Actual (Comorbidity Management)  Goal: Unobstructed Breathing During Sleep  Outcome: Ongoing, Progressing     Problem: Seizure Disorder Comorbidity  Goal: Maintenance of Seizure Control  Outcome: Ongoing, Progressing  Intervention: Maintain Seizure-Symptom Control  Flowsheets (Taken 10/7/2020 0815)  Seizure Precautions:   activity supervised   clutter-free environment maintained   side rails padded     Problem: Infection  Goal: Infection Symptom Resolution  Outcome: Ongoing, Progressing  Intervention: Prevent or Manage Infection  Flowsheets (Taken 10/6/2020 1901 by Lesly Waldron, RN)  Infection Management: aseptic technique maintained     Problem: Pain Chronic (Persistent)  Goal: Acceptable Pain Control and Functional Ability  Outcome: Ongoing, Progressing  Intervention: Develop Pain Management Plan  Flowsheets (Taken 10/5/2020 1547 by Shivani Duran, RN)  Pain Management Interventions: no interventions per patient request     Problem: Pain Acute  Goal: Optimal Pain Control  Outcome: Ongoing, Progressing  Intervention: Develop Pain Management Plan  Flowsheets (Taken 10/5/2020 1547 by Shivani Duran, RN)  Pain Management Interventions: no interventions per patient request   Goal Outcome Evaluation:  Plan of Care Reviewed With: patient  Progress: improving

## 2020-10-07 NOTE — PLAN OF CARE
Goal Outcome Evaluation:  Plan of Care Reviewed With: patient  Progress: improving  Outcome Summary: Patient rested well through the night. No complaints of right foot pain but continues to request iv dilaudid for chronic back pain. Ambulating with post op shoe and weight on heel.

## 2020-10-08 ENCOUNTER — READMISSION MANAGEMENT (OUTPATIENT)
Dept: CALL CENTER | Facility: HOSPITAL | Age: 75
End: 2020-10-08

## 2020-10-08 LAB
BACTERIA SPEC AEROBE CULT: NORMAL
GRAM STN SPEC: NORMAL
GRAM STN SPEC: NORMAL

## 2020-10-08 NOTE — PROGRESS NOTES
Case Management Discharge Note                                 Final Discharge Disposition Code: 01 - home or self-care

## 2020-10-08 NOTE — OUTREACH NOTE
Prep Survey      Responses   Religion facility patient discharged from?  [facility]   Is LACE score < 7 ?  No   Eligibility  Readm Mgmt   Discharge diagnosis  diabetic foot ulcer, incision and drainage with debridement, osteomyelitis    Does the patient have one of the following disease processes/diagnoses(primary or secondary)?  General Surgery   Does the patient have Home health ordered?  No   Is there a DME ordered?  No   Prep survey completed?  Yes          Jayne Gramajo RN

## 2020-10-10 LAB — BACTERIA SPEC ANAEROBE CULT: NORMAL

## 2020-10-13 ENCOUNTER — READMISSION MANAGEMENT (OUTPATIENT)
Dept: CALL CENTER | Facility: HOSPITAL | Age: 75
End: 2020-10-13

## 2020-10-13 NOTE — OUTREACH NOTE
General Surgery Week 1 Survey      Responses   [facility] [place] patient discharged from?  [facility]   Does the patient have one of the following disease processes/diagnoses(primary or secondary)?  General Surgery   Week 1 attempt successful?  Yes   Call start time  0956   Call end time  0957   Discharge diagnosis  diabetic foot ulcer, incision and drainage with debridement, osteomyelitis    Meds reviewed with patient/caregiver?  Yes   Is the patient having any side effects they believe may be caused by any medication additions or changes?  No   Does the patient have all medications related to this admission filled (includes all antibiotics, pain medications, etc.)  Yes   Is the patient taking all medications as directed (includes completed medication regime)?  Yes   Does the patient have a follow up appointment scheduled with their surgeon?  Yes   Has the patient kept scheduled appointments due by today?  Yes   Comments  Reviewed appts.   Has home health visited the patient within 72 hours of discharge?  N/A   Psychosocial issues?  No   Comments  Pt didnt seem interested. States that he is doing fine.   Did the patient receive a copy of their discharge instructions?  Yes   Nursing interventions  Reviewed instructions with patient   What is the patient's perception of their health status since discharge?  Improving   Nursing interventions  Nurse provided patient education   Is the patient /caregiver able to teach back basic post-op care?  Take showers only when approved by MD-sponge bathe until then, No tub bath, swimming, or hot tub until instructed by MD, Lifting as instructed by MD in discharge instructions, Keep incision areas clean,dry and protected, Drive as instructed by MD in discharge instructions   Is the patient/caregiver able to teach back signs and symptoms of incisional infection?  Increased redness, swelling or pain at the incisonal site, Increased drainage or bleeding, Pus or odor from incision, Fever,  Incisional warmth   Is the patient/caregiver able to teach back steps to recovery at home?  Set small, achievable goals for return to baseline health, Rest and rebuild strength, gradually increase activity   Is the patient/caregiver able to teach back the hierarchy of who to call/visit for symptoms/problems? PCP, Specialist, Home health nurse, Urgent Care, ED, 911  Yes   Week 1 call completed?  Yes          Rodney Robles RN

## 2020-10-21 ENCOUNTER — READMISSION MANAGEMENT (OUTPATIENT)
Dept: CALL CENTER | Facility: HOSPITAL | Age: 75
End: 2020-10-21

## 2020-10-21 NOTE — OUTREACH NOTE
General Surgery Week 2 Survey      Responses   [facility] patient discharged from?  [facility]   Does the patient have one of the following disease processes/diagnoses(primary or secondary)?  General Surgery   Week 2 attempt successful?  Yes   Call start time  0957   Call end time  0959   Discharge diagnosis  diabetic foot ulcer, incision and drainage with debridement, osteomyelitis    Is patient permission given to speak with other caregiver?  Yes   List who call center can speak with  Meghan   Person spoke with today (if not patient) and relationship  Meghan   Meds reviewed with patient/caregiver?  Yes   Is the patient taking all medications as directed (includes completed medication regime)?  Yes   Medication comments  Finished antibx.   Has the patient kept scheduled appointments due by today?  Yes   Psychosocial issues?  No   Nursing interventions  Nurse provided patient education   Is the patient/caregiver able to teach back signs and symptoms of incisional infection?  Increased redness, swelling or pain at the incisonal site, Increased drainage or bleeding, Incisional warmth, Pus or odor from incision   If the patient is a current smoker, are they able to teach back resources for cessation?  Not a smoker [Wife states pt is not smoking]   Is the patient/caregiver able to teach back the hierarchy of who to call/visit for symptoms/problems? PCP, Specialist, Home health nurse, Urgent Care, ED, 911  Yes   Additional teach back comments  Another appt on Friday   Week 2 call completed?  Yes          Aracelis Chung RN

## 2020-10-23 ENCOUNTER — HOSPITAL ENCOUNTER (OUTPATIENT)
Facility: HOSPITAL | Age: 75
Setting detail: HOSPITAL OUTPATIENT SURGERY
End: 2020-10-23
Attending: PODIATRIST | Admitting: PODIATRIST

## 2020-10-23 ENCOUNTER — OFFICE VISIT (OUTPATIENT)
Dept: PAIN MEDICINE | Facility: CLINIC | Age: 75
End: 2020-10-23

## 2020-10-23 VITALS
HEART RATE: 90 BPM | OXYGEN SATURATION: 97 % | DIASTOLIC BLOOD PRESSURE: 70 MMHG | SYSTOLIC BLOOD PRESSURE: 115 MMHG | TEMPERATURE: 97.1 F | BODY MASS INDEX: 33.13 KG/M2 | WEIGHT: 250 LBS | RESPIRATION RATE: 16 BRPM | HEIGHT: 73 IN

## 2020-10-23 DIAGNOSIS — M54.59 LUMBAR FACET JOINT PAIN: ICD-10-CM

## 2020-10-23 DIAGNOSIS — M54.50 CHRONIC MIDLINE LOW BACK PAIN WITHOUT SCIATICA: Primary | ICD-10-CM

## 2020-10-23 DIAGNOSIS — M51.36 DDD (DEGENERATIVE DISC DISEASE), LUMBAR: ICD-10-CM

## 2020-10-23 DIAGNOSIS — G89.29 CHRONIC MIDLINE LOW BACK PAIN WITHOUT SCIATICA: Primary | ICD-10-CM

## 2020-10-23 DIAGNOSIS — R10.84 GENERALIZED ABDOMINAL PAIN: ICD-10-CM

## 2020-10-23 DIAGNOSIS — M54.16 LUMBAR RADICULOPATHY: ICD-10-CM

## 2020-10-23 DIAGNOSIS — M47.817 SPONDYLOSIS OF LUMBOSACRAL REGION WITHOUT MYELOPATHY OR RADICULOPATHY: ICD-10-CM

## 2020-10-23 PROCEDURE — 99213 OFFICE O/P EST LOW 20 MIN: CPT | Performed by: PHYSICAL MEDICINE & REHABILITATION

## 2020-10-23 PROCEDURE — G0463 HOSPITAL OUTPT CLINIC VISIT: HCPCS | Performed by: PHYSICAL MEDICINE & REHABILITATION

## 2020-10-23 RX ORDER — OXYCODONE AND ACETAMINOPHEN 10; 325 MG/1; MG/1
1 TABLET ORAL EVERY 6 HOURS PRN
Qty: 120 TABLET | Refills: 0 | Status: SHIPPED | OUTPATIENT
Start: 2020-10-23 | End: 2021-01-14 | Stop reason: SDUPTHER

## 2020-10-23 RX ORDER — BUDESONIDE 0.5 MG/2ML
0.5 INHALANT ORAL DAILY PRN
COMMUNITY
Start: 2020-09-08 | End: 2020-10-29 | Stop reason: HOSPADM

## 2020-10-23 RX ORDER — OXYCODONE AND ACETAMINOPHEN 10; 325 MG/1; MG/1
1 TABLET ORAL EVERY 6 HOURS PRN
Qty: 120 TABLET | Refills: 0 | Status: SHIPPED | OUTPATIENT
Start: 2020-10-23 | End: 2020-10-23 | Stop reason: SDUPTHER

## 2020-10-23 RX ORDER — PREDNISONE 20 MG/1
TABLET ORAL
COMMUNITY
Start: 2020-10-16 | End: 2020-10-25

## 2020-10-23 RX ORDER — BENZONATATE 100 MG/1
100 CAPSULE ORAL 2 TIMES DAILY PRN
COMMUNITY
Start: 2020-10-16 | End: 2020-10-29 | Stop reason: HOSPADM
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
## 2020-10-24 ENCOUNTER — LAB (OUTPATIENT)
Dept: LAB | Facility: HOSPITAL | Age: 75
End: 2020-10-24

## 2020-10-24 LAB
ANION GAP SERPL CALCULATED.3IONS-SCNC: 10.6 MMOL/L (ref 5–15)
BUN SERPL-MCNC: 27 MG/DL (ref 8–23)
BUN/CREAT SERPL: 22 (ref 7–25)
CALCIUM SPEC-SCNC: 8.7 MG/DL (ref 8.6–10.5)
CHLORIDE SERPL-SCNC: 101 MMOL/L (ref 98–107)
CO2 SERPL-SCNC: 25.4 MMOL/L (ref 22–29)
CREAT SERPL-MCNC: 1.23 MG/DL (ref 0.76–1.27)
DEPRECATED RDW RBC AUTO: 48 FL (ref 37–54)
ERYTHROCYTE [DISTWIDTH] IN BLOOD BY AUTOMATED COUNT: 14.3 % (ref 12.3–15.4)
GFR SERPL CREATININE-BSD FRML MDRD: 58 ML/MIN/1.73
GLUCOSE SERPL-MCNC: 197 MG/DL (ref 65–99)
HCT VFR BLD AUTO: 39.2 % (ref 37.5–51)
HGB BLD-MCNC: 13.7 G/DL (ref 13–17.7)
MCH RBC QN AUTO: 32.3 PG (ref 26.6–33)
MCHC RBC AUTO-ENTMCNC: 34.9 G/DL (ref 31.5–35.7)
MCV RBC AUTO: 92.5 FL (ref 79–97)
PLATELET # BLD AUTO: 300 10*3/MM3 (ref 140–450)
PMV BLD AUTO: 10.3 FL (ref 6–12)
POTASSIUM SERPL-SCNC: 3.7 MMOL/L (ref 3.5–5.2)
RBC # BLD AUTO: 4.24 10*6/MM3 (ref 4.14–5.8)
SODIUM SERPL-SCNC: 137 MMOL/L (ref 136–145)
WBC # BLD AUTO: 10.02 10*3/MM3 (ref 3.4–10.8)

## 2020-10-24 PROCEDURE — U0004 COV-19 TEST NON-CDC HGH THRU: HCPCS

## 2020-10-24 PROCEDURE — C9803 HOPD COVID-19 SPEC COLLECT: HCPCS

## 2020-10-24 PROCEDURE — 80048 BASIC METABOLIC PNL TOTAL CA: CPT

## 2020-10-24 PROCEDURE — 85027 COMPLETE CBC AUTOMATED: CPT

## 2020-10-25 ENCOUNTER — APPOINTMENT (OUTPATIENT)
Dept: CT IMAGING | Facility: HOSPITAL | Age: 75
End: 2020-10-25

## 2020-10-25 ENCOUNTER — HOSPITAL ENCOUNTER (INPATIENT)
Facility: HOSPITAL | Age: 75
LOS: 2 days | Discharge: HOME | End: 2020-10-29
Attending: EMERGENCY MEDICINE | Admitting: FAMILY MEDICINE

## 2020-10-25 DIAGNOSIS — K56.609 SMALL BOWEL OBSTRUCTION (HCC): ICD-10-CM

## 2020-10-25 DIAGNOSIS — K43.6 VENTRAL HERNIA WITH OBSTRUCTION BUT NO GANGRENE: Primary | ICD-10-CM

## 2020-10-25 DIAGNOSIS — L97.512 DIABETIC ULCER OF OTHER PART OF RIGHT FOOT ASSOCIATED WITH TYPE 2 DIABETES MELLITUS, WITH FAT LAYER EXPOSED (HCC): ICD-10-CM

## 2020-10-25 DIAGNOSIS — E11.621 DIABETIC ULCER OF OTHER PART OF RIGHT FOOT ASSOCIATED WITH TYPE 2 DIABETES MELLITUS, WITH FAT LAYER EXPOSED (HCC): ICD-10-CM

## 2020-10-25 LAB
ABO GROUP BLD: NORMAL
ALBUMIN SERPL-MCNC: 3.8 G/DL (ref 3.5–5.2)
ALBUMIN/GLOB SERPL: 1.3 G/DL
ALP SERPL-CCNC: 162 U/L (ref 39–117)
ALT SERPL W P-5'-P-CCNC: 16 U/L (ref 1–41)
ANION GAP SERPL CALCULATED.3IONS-SCNC: 11 MMOL/L (ref 5–15)
APTT PPP: 26.5 SECONDS (ref 24–31)
AST SERPL-CCNC: 13 U/L (ref 1–40)
BASOPHILS # BLD AUTO: 0.1 10*3/MM3 (ref 0–0.2)
BASOPHILS NFR BLD AUTO: 0.7 % (ref 0–1.5)
BILIRUB SERPL-MCNC: 1.4 MG/DL (ref 0–1.2)
BILIRUB UR QL STRIP: NEGATIVE
BLD GP AB SCN SERPL QL: NEGATIVE
BUN SERPL-MCNC: 21 MG/DL (ref 8–23)
BUN/CREAT SERPL: 19.4 (ref 7–25)
CALCIUM SPEC-SCNC: 9.1 MG/DL (ref 8.6–10.5)
CHLORIDE SERPL-SCNC: 99 MMOL/L (ref 98–107)
CLARITY UR: CLEAR
CO2 SERPL-SCNC: 28 MMOL/L (ref 22–29)
COLOR UR: YELLOW
CREAT SERPL-MCNC: 1.08 MG/DL (ref 0.76–1.27)
DEPRECATED RDW RBC AUTO: 49 FL (ref 37–54)
EOSINOPHIL # BLD AUTO: 0.5 10*3/MM3 (ref 0–0.4)
EOSINOPHIL NFR BLD AUTO: 3.8 % (ref 0.3–6.2)
ERYTHROCYTE [DISTWIDTH] IN BLOOD BY AUTOMATED COUNT: 15.8 % (ref 12.3–15.4)
GFR SERPL CREATININE-BSD FRML MDRD: 67 ML/MIN/1.73
GLOBULIN UR ELPH-MCNC: 2.9 GM/DL
GLUCOSE SERPL-MCNC: 163 MG/DL (ref 65–99)
GLUCOSE UR STRIP-MCNC: ABNORMAL MG/DL
HCT VFR BLD AUTO: 43.6 % (ref 37.5–51)
HGB BLD-MCNC: 14.7 G/DL (ref 13–17.7)
HGB UR QL STRIP.AUTO: NEGATIVE
INR PPP: 1.04 (ref 0.93–1.1)
KETONES UR QL STRIP: NEGATIVE
LEUKOCYTE ESTERASE UR QL STRIP.AUTO: NEGATIVE
LYMPHOCYTES # BLD AUTO: 1.3 10*3/MM3 (ref 0.7–3.1)
LYMPHOCYTES NFR BLD AUTO: 10.5 % (ref 19.6–45.3)
MCH RBC QN AUTO: 30.3 PG (ref 26.6–33)
MCHC RBC AUTO-ENTMCNC: 33.7 G/DL (ref 31.5–35.7)
MCV RBC AUTO: 89.8 FL (ref 79–97)
MONOCYTES # BLD AUTO: 0.9 10*3/MM3 (ref 0.1–0.9)
MONOCYTES NFR BLD AUTO: 7.3 % (ref 5–12)
NEUTROPHILS NFR BLD AUTO: 77.7 % (ref 42.7–76)
NEUTROPHILS NFR BLD AUTO: 9.5 10*3/MM3 (ref 1.7–7)
NITRITE UR QL STRIP: NEGATIVE
NRBC BLD AUTO-RTO: 0.1 /100 WBC (ref 0–0.2)
PH UR STRIP.AUTO: <=5 [PH] (ref 5–8)
PLATELET # BLD AUTO: 351 10*3/MM3 (ref 140–450)
PMV BLD AUTO: 8.2 FL (ref 6–12)
POTASSIUM SERPL-SCNC: 4.4 MMOL/L (ref 3.5–5.2)
PROT SERPL-MCNC: 6.7 G/DL (ref 6–8.5)
PROT UR QL STRIP: NEGATIVE
PROTHROMBIN TIME: 11.4 SECONDS (ref 9.6–11.7)
RBC # BLD AUTO: 4.85 10*6/MM3 (ref 4.14–5.8)
RH BLD: POSITIVE
SARS-COV-2 RNA RESP QL NAA+PROBE: NOT DETECTED
SODIUM SERPL-SCNC: 138 MMOL/L (ref 136–145)
SP GR UR STRIP: 1.05 (ref 1–1.03)
T&S EXPIRATION DATE: NORMAL
UROBILINOGEN UR QL STRIP: ABNORMAL
WBC # BLD AUTO: 12.2 10*3/MM3 (ref 3.4–10.8)

## 2020-10-25 PROCEDURE — G0378 HOSPITAL OBSERVATION PER HR: HCPCS

## 2020-10-25 PROCEDURE — 25010000002 HYDROMORPHONE PER 4 MG: Performed by: EMERGENCY MEDICINE

## 2020-10-25 PROCEDURE — 85730 THROMBOPLASTIN TIME PARTIAL: CPT | Performed by: EMERGENCY MEDICINE

## 2020-10-25 PROCEDURE — 80053 COMPREHEN METABOLIC PANEL: CPT | Performed by: EMERGENCY MEDICINE

## 2020-10-25 PROCEDURE — 99285 EMERGENCY DEPT VISIT HI MDM: CPT

## 2020-10-25 PROCEDURE — 0 IOPAMIDOL PER 1 ML: Performed by: EMERGENCY MEDICINE

## 2020-10-25 PROCEDURE — 81003 URINALYSIS AUTO W/O SCOPE: CPT | Performed by: EMERGENCY MEDICINE

## 2020-10-25 PROCEDURE — 85610 PROTHROMBIN TIME: CPT | Performed by: EMERGENCY MEDICINE

## 2020-10-25 PROCEDURE — 25010000002 HYDROMORPHONE PER 4 MG: Performed by: INTERNAL MEDICINE

## 2020-10-25 PROCEDURE — 25010000002 ONDANSETRON PER 1 MG: Performed by: EMERGENCY MEDICINE

## 2020-10-25 PROCEDURE — 86900 BLOOD TYPING SEROLOGIC ABO: CPT | Performed by: EMERGENCY MEDICINE

## 2020-10-25 PROCEDURE — 85025 COMPLETE CBC W/AUTO DIFF WBC: CPT | Performed by: EMERGENCY MEDICINE

## 2020-10-25 PROCEDURE — 86901 BLOOD TYPING SEROLOGIC RH(D): CPT | Performed by: EMERGENCY MEDICINE

## 2020-10-25 PROCEDURE — 86850 RBC ANTIBODY SCREEN: CPT | Performed by: EMERGENCY MEDICINE

## 2020-10-25 PROCEDURE — 74177 CT ABD & PELVIS W/CONTRAST: CPT

## 2020-10-25 RX ORDER — BUDESONIDE 0.5 MG/2ML
0.5 INHALANT ORAL
Status: DISCONTINUED | OUTPATIENT
Start: 2020-10-25 | End: 2020-10-29 | Stop reason: HOSPADM

## 2020-10-25 RX ORDER — HYDROMORPHONE HCL 110MG/55ML
1 PATIENT CONTROLLED ANALGESIA SYRINGE INTRAVENOUS
Status: DISCONTINUED | OUTPATIENT
Start: 2020-10-25 | End: 2020-10-28

## 2020-10-25 RX ORDER — DIGOXIN 0.25 MG/ML
200 INJECTION INTRAMUSCULAR; INTRAVENOUS
Status: DISCONTINUED | OUTPATIENT
Start: 2020-10-26 | End: 2020-10-29 | Stop reason: HOSPADM

## 2020-10-25 RX ORDER — SODIUM CHLORIDE, SODIUM LACTATE, POTASSIUM CHLORIDE, CALCIUM CHLORIDE 600; 310; 30; 20 MG/100ML; MG/100ML; MG/100ML; MG/100ML
100 INJECTION, SOLUTION INTRAVENOUS CONTINUOUS
Status: DISCONTINUED | OUTPATIENT
Start: 2020-10-25 | End: 2020-10-26

## 2020-10-25 RX ORDER — METOPROLOL TARTRATE 5 MG/5ML
5 INJECTION INTRAVENOUS EVERY 6 HOURS
Status: DISCONTINUED | OUTPATIENT
Start: 2020-10-25 | End: 2020-10-29 | Stop reason: HOSPADM

## 2020-10-25 RX ORDER — HYDROMORPHONE HCL 110MG/55ML
1 PATIENT CONTROLLED ANALGESIA SYRINGE INTRAVENOUS ONCE
Status: COMPLETED | OUTPATIENT
Start: 2020-10-25 | End: 2020-10-25

## 2020-10-25 RX ORDER — SODIUM CHLORIDE 0.9 % (FLUSH) 0.9 %
10 SYRINGE (ML) INJECTION EVERY 12 HOURS SCHEDULED
Status: DISCONTINUED | OUTPATIENT
Start: 2020-10-25 | End: 2020-10-29 | Stop reason: HOSPADM

## 2020-10-25 RX ORDER — PREGABALIN 75 MG/1
150 CAPSULE ORAL EVERY 12 HOURS SCHEDULED
Status: DISCONTINUED | OUTPATIENT
Start: 2020-10-26 | End: 2020-10-26

## 2020-10-25 RX ORDER — IPRATROPIUM BROMIDE AND ALBUTEROL SULFATE 2.5; .5 MG/3ML; MG/3ML
3 SOLUTION RESPIRATORY (INHALATION) EVERY 4 HOURS PRN
Status: DISCONTINUED | OUTPATIENT
Start: 2020-10-25 | End: 2020-10-26

## 2020-10-25 RX ORDER — ONDANSETRON 2 MG/ML
8 INJECTION INTRAMUSCULAR; INTRAVENOUS ONCE
Status: COMPLETED | OUTPATIENT
Start: 2020-10-25 | End: 2020-10-25

## 2020-10-25 RX ORDER — SODIUM CHLORIDE 0.9 % (FLUSH) 0.9 %
10 SYRINGE (ML) INJECTION AS NEEDED
Status: DISCONTINUED | OUTPATIENT
Start: 2020-10-25 | End: 2020-10-29 | Stop reason: HOSPADM

## 2020-10-25 RX ORDER — FAMOTIDINE 10 MG/ML
20 INJECTION, SOLUTION INTRAVENOUS EVERY 12 HOURS SCHEDULED
Status: DISCONTINUED | OUTPATIENT
Start: 2020-10-26 | End: 2020-10-29 | Stop reason: HOSPADM

## 2020-10-25 RX ADMIN — HYDROMORPHONE HYDROCHLORIDE 1 MG: 2 INJECTION, SOLUTION INTRAMUSCULAR; INTRAVENOUS; SUBCUTANEOUS at 17:55

## 2020-10-25 RX ADMIN — SODIUM CHLORIDE, POTASSIUM CHLORIDE, SODIUM LACTATE AND CALCIUM CHLORIDE 100 ML/HR: 600; 310; 30; 20 INJECTION, SOLUTION INTRAVENOUS at 23:31

## 2020-10-25 RX ADMIN — PREGABALIN 150 MG: 75 CAPSULE ORAL at 23:51

## 2020-10-25 RX ADMIN — Medication 10 ML: at 23:31

## 2020-10-25 RX ADMIN — HYDROMORPHONE HYDROCHLORIDE 1 MG: 2 INJECTION, SOLUTION INTRAMUSCULAR; INTRAVENOUS; SUBCUTANEOUS at 20:11

## 2020-10-25 RX ADMIN — HYDROMORPHONE HYDROCHLORIDE 1 MG: 2 INJECTION, SOLUTION INTRAMUSCULAR; INTRAVENOUS; SUBCUTANEOUS at 23:51

## 2020-10-25 RX ADMIN — ONDANSETRON 8 MG: 2 INJECTION INTRAMUSCULAR; INTRAVENOUS at 17:52

## 2020-10-25 RX ADMIN — FAMOTIDINE 20 MG: 10 INJECTION, SOLUTION INTRAVENOUS at 17:53

## 2020-10-25 RX ADMIN — IOPAMIDOL 100 ML: 755 INJECTION, SOLUTION INTRAVENOUS at 18:55

## 2020-10-25 RX ADMIN — SODIUM CHLORIDE, SODIUM LACTATE, POTASSIUM CHLORIDE, AND CALCIUM CHLORIDE 1000 ML: 600; 310; 30; 20 INJECTION, SOLUTION INTRAVENOUS at 17:50

## 2020-10-25 RX ADMIN — METOPROLOL TARTRATE 5 MG: 1 INJECTION, SOLUTION INTRAVENOUS at 23:50

## 2020-10-26 ENCOUNTER — ANESTHESIA EVENT (OUTPATIENT)
Dept: SURGERY | Facility: HOSPITAL | Age: 75
End: 2020-10-26

## 2020-10-26 ENCOUNTER — READMISSION MANAGEMENT (OUTPATIENT)
Dept: CALL CENTER | Facility: HOSPITAL | Age: 75
End: 2020-10-26

## 2020-10-26 PROBLEM — N18.31 STAGE 3A CHRONIC KIDNEY DISEASE: Chronic | Status: ACTIVE | Noted: 2020-10-26

## 2020-10-26 PROBLEM — E13.51 PERIPHERAL VASCULAR DISEASE DUE TO SECONDARY DIABETES (HCC): Chronic | Status: ACTIVE | Noted: 2020-10-26

## 2020-10-26 PROBLEM — E11.40 TYPE 2 DIABETES, CONTROLLED, WITH NEUROPATHY: Chronic | Status: ACTIVE | Noted: 2020-10-26

## 2020-10-26 PROBLEM — I25.119 ATHEROSCLEROSIS OF NATIVE CORONARY ARTERY OF NATIVE HEART WITH ANGINA PECTORIS (HCC): Status: ACTIVE | Noted: 2019-12-03

## 2020-10-26 PROBLEM — K70.31 ALCOHOLIC CIRRHOSIS OF LIVER WITH ASCITES: Chronic | Status: ACTIVE | Noted: 2020-10-26

## 2020-10-26 PROBLEM — J44.1 COPD EXACERBATION (HCC): Status: RESOLVED | Noted: 2019-12-04 | Resolved: 2020-10-26

## 2020-10-26 PROBLEM — E78.5 HYPERLIPIDEMIA: Status: RESOLVED | Noted: 2019-12-03 | Resolved: 2020-10-26

## 2020-10-26 PROBLEM — I20.0 UNSTABLE ANGINA (HCC): Status: RESOLVED | Noted: 2019-12-03 | Resolved: 2020-10-26

## 2020-10-26 PROBLEM — K56.609 SBO (SMALL BOWEL OBSTRUCTION): Status: RESOLVED | Noted: 2020-08-04 | Resolved: 2020-10-26

## 2020-10-26 PROBLEM — R10.84 GENERALIZED ABDOMINAL PAIN: Status: RESOLVED | Noted: 2020-06-08 | Resolved: 2020-10-26

## 2020-10-26 PROBLEM — J96.11 CHRONIC RESPIRATORY FAILURE WITH HYPOXIA (HCC): Chronic | Status: ACTIVE | Noted: 2020-10-26

## 2020-10-26 PROBLEM — E11.69 DM TYPE 2 WITH DIABETIC DYSLIPIDEMIA (HCC): Status: ACTIVE | Noted: 2020-10-26

## 2020-10-26 PROBLEM — K56.609 SMALL BOWEL OBSTRUCTION: Status: RESOLVED | Noted: 2020-06-12 | Resolved: 2020-10-26

## 2020-10-26 PROBLEM — J44.9 CHRONIC BRONCHITIS WITH COPD (CHRONIC OBSTRUCTIVE PULMONARY DISEASE) (HCC): Chronic | Status: ACTIVE | Noted: 2020-10-26

## 2020-10-26 PROBLEM — E78.5 DM TYPE 2 WITH DIABETIC DYSLIPIDEMIA (HCC): Status: ACTIVE | Noted: 2020-10-26

## 2020-10-26 PROBLEM — I12.9 BENIGN HYPERTENSION WITH CKD (CHRONIC KIDNEY DISEASE) STAGE III: Chronic | Status: ACTIVE | Noted: 2020-10-26

## 2020-10-26 PROBLEM — N18.30 BENIGN HYPERTENSION WITH CKD (CHRONIC KIDNEY DISEASE) STAGE III (HCC): Chronic | Status: ACTIVE | Noted: 2020-10-26

## 2020-10-26 PROBLEM — I10 ESSENTIAL HYPERTENSION: Status: RESOLVED | Noted: 2019-12-03 | Resolved: 2020-10-26

## 2020-10-26 PROBLEM — M86.9 OSTEOMYELITIS: Status: RESOLVED | Noted: 2020-10-02 | Resolved: 2020-10-26

## 2020-10-26 LAB
GLUCOSE BLDC GLUCOMTR-MCNC: 125 MG/DL (ref 70–105)
GLUCOSE BLDC GLUCOMTR-MCNC: 137 MG/DL (ref 70–105)
GLUCOSE BLDC GLUCOMTR-MCNC: 138 MG/DL (ref 70–105)
GLUCOSE BLDC GLUCOMTR-MCNC: 160 MG/DL (ref 70–105)

## 2020-10-26 PROCEDURE — 82962 GLUCOSE BLOOD TEST: CPT

## 2020-10-26 PROCEDURE — 99221 1ST HOSP IP/OBS SF/LOW 40: CPT | Performed by: SURGERY

## 2020-10-26 PROCEDURE — 25010000002 ONDANSETRON PER 1 MG: Performed by: FAMILY MEDICINE

## 2020-10-26 PROCEDURE — 25010000002 HYDROMORPHONE PER 4 MG: Performed by: FAMILY MEDICINE

## 2020-10-26 PROCEDURE — 25010000002 DIGOXIN PER 500 MCG: Performed by: FAMILY MEDICINE

## 2020-10-26 PROCEDURE — 94799 UNLISTED PULMONARY SVC/PX: CPT

## 2020-10-26 PROCEDURE — 25010000002 PIPERACILLIN SOD-TAZOBACTAM PER 1 G: Performed by: FAMILY MEDICINE

## 2020-10-26 PROCEDURE — 63710000001 INSULIN LISPRO (HUMAN) PER 5 UNITS: Performed by: FAMILY MEDICINE

## 2020-10-26 PROCEDURE — G0378 HOSPITAL OBSERVATION PER HR: HCPCS

## 2020-10-26 PROCEDURE — 25010000002 HYDROMORPHONE PER 4 MG: Performed by: INTERNAL MEDICINE

## 2020-10-26 RX ORDER — SODIUM CHLORIDE 0.9 % (FLUSH) 0.9 %
10 SYRINGE (ML) INJECTION EVERY 12 HOURS SCHEDULED
Status: CANCELLED | OUTPATIENT
Start: 2020-10-26

## 2020-10-26 RX ORDER — IPRATROPIUM BROMIDE AND ALBUTEROL SULFATE 2.5; .5 MG/3ML; MG/3ML
3 SOLUTION RESPIRATORY (INHALATION) EVERY 4 HOURS PRN
Status: DISCONTINUED | OUTPATIENT
Start: 2020-10-26 | End: 2020-10-29 | Stop reason: HOSPADM

## 2020-10-26 RX ORDER — SODIUM CHLORIDE 0.9 % (FLUSH) 0.9 %
10 SYRINGE (ML) INJECTION AS NEEDED
Status: CANCELLED | OUTPATIENT
Start: 2020-10-26

## 2020-10-26 RX ORDER — GUAIFENESIN/DEXTROMETHORPHAN 100-10MG/5
5 SYRUP ORAL EVERY 4 HOURS PRN
Status: DISCONTINUED | OUTPATIENT
Start: 2020-10-26 | End: 2020-10-29 | Stop reason: HOSPADM

## 2020-10-26 RX ORDER — INSULIN LISPRO 100 [IU]/ML
0-7 INJECTION, SOLUTION INTRAVENOUS; SUBCUTANEOUS AS NEEDED
Status: DISCONTINUED | OUTPATIENT
Start: 2020-10-26 | End: 2020-10-26

## 2020-10-26 RX ORDER — NICOTINE POLACRILEX 4 MG
15 LOZENGE BUCCAL
Status: DISCONTINUED | OUTPATIENT
Start: 2020-10-26 | End: 2020-10-29 | Stop reason: HOSPADM

## 2020-10-26 RX ORDER — INSULIN LISPRO 100 [IU]/ML
0-7 INJECTION, SOLUTION INTRAVENOUS; SUBCUTANEOUS
Status: DISCONTINUED | OUTPATIENT
Start: 2020-10-26 | End: 2020-10-26

## 2020-10-26 RX ORDER — SODIUM CHLORIDE, SODIUM LACTATE, POTASSIUM CHLORIDE, CALCIUM CHLORIDE 600; 310; 30; 20 MG/100ML; MG/100ML; MG/100ML; MG/100ML
9 INJECTION, SOLUTION INTRAVENOUS CONTINUOUS PRN
Status: CANCELLED | OUTPATIENT
Start: 2020-10-26

## 2020-10-26 RX ORDER — SODIUM CHLORIDE, SODIUM LACTATE, POTASSIUM CHLORIDE, CALCIUM CHLORIDE 600; 310; 30; 20 MG/100ML; MG/100ML; MG/100ML; MG/100ML
125 INJECTION, SOLUTION INTRAVENOUS CONTINUOUS
Status: DISCONTINUED | OUTPATIENT
Start: 2020-10-26 | End: 2020-10-29 | Stop reason: HOSPADM

## 2020-10-26 RX ORDER — INSULIN LISPRO 100 [IU]/ML
0-7 INJECTION, SOLUTION INTRAVENOUS; SUBCUTANEOUS EVERY 8 HOURS
Status: DISCONTINUED | OUTPATIENT
Start: 2020-10-26 | End: 2020-10-27

## 2020-10-26 RX ORDER — ONDANSETRON 2 MG/ML
4 INJECTION INTRAMUSCULAR; INTRAVENOUS EVERY 6 HOURS PRN
Status: DISCONTINUED | OUTPATIENT
Start: 2020-10-26 | End: 2020-10-29

## 2020-10-26 RX ORDER — IPRATROPIUM BROMIDE AND ALBUTEROL SULFATE 2.5; .5 MG/3ML; MG/3ML
3 SOLUTION RESPIRATORY (INHALATION) 4 TIMES DAILY
Status: DISCONTINUED | OUTPATIENT
Start: 2020-10-26 | End: 2020-10-29 | Stop reason: HOSPADM

## 2020-10-26 RX ORDER — BENZONATATE 100 MG/1
100 CAPSULE ORAL 2 TIMES DAILY PRN
Status: DISCONTINUED | OUTPATIENT
Start: 2020-10-26 | End: 2020-10-29 | Stop reason: HOSPADM

## 2020-10-26 RX ORDER — INSULIN LISPRO 100 [IU]/ML
0-7 INJECTION, SOLUTION INTRAVENOUS; SUBCUTANEOUS AS NEEDED
Status: DISCONTINUED | OUTPATIENT
Start: 2020-10-26 | End: 2020-10-27

## 2020-10-26 RX ORDER — DEXTROSE MONOHYDRATE 25 G/50ML
25 INJECTION, SOLUTION INTRAVENOUS
Status: DISCONTINUED | OUTPATIENT
Start: 2020-10-26 | End: 2020-10-29 | Stop reason: HOSPADM

## 2020-10-26 RX ADMIN — GUAIFENESIN AND DEXTROMETHORPHAN 5 ML: 100; 10 SYRUP ORAL at 20:21

## 2020-10-26 RX ADMIN — PHENOL 1 SPRAY: 1.5 LIQUID ORAL at 15:14

## 2020-10-26 RX ADMIN — HYDROMORPHONE HYDROCHLORIDE 1 MG: 2 INJECTION, SOLUTION INTRAMUSCULAR; INTRAVENOUS; SUBCUTANEOUS at 23:40

## 2020-10-26 RX ADMIN — Medication 10 ML: at 10:04

## 2020-10-26 RX ADMIN — DIGOXIN 200 MCG: 250 INJECTION, SOLUTION INTRAMUSCULAR; INTRAVENOUS; PARENTERAL at 13:29

## 2020-10-26 RX ADMIN — GUAIFENESIN AND DEXTROMETHORPHAN 5 ML: 100; 10 SYRUP ORAL at 15:19

## 2020-10-26 RX ADMIN — PIPERACILLIN AND TAZOBACTAM 4.5 G: 4; .5 INJECTION, POWDER, FOR SOLUTION INTRAVENOUS at 22:25

## 2020-10-26 RX ADMIN — PIPERACILLIN SODIUM AND TAZOBACTAM SODIUM 4.5 G: 4; .5 INJECTION, POWDER, LYOPHILIZED, FOR SOLUTION INTRAVENOUS at 10:03

## 2020-10-26 RX ADMIN — ONDANSETRON 4 MG: 2 INJECTION INTRAMUSCULAR; INTRAVENOUS at 09:44

## 2020-10-26 RX ADMIN — FAMOTIDINE 20 MG: 10 INJECTION, SOLUTION INTRAVENOUS at 09:44

## 2020-10-26 RX ADMIN — FAMOTIDINE 20 MG: 10 INJECTION, SOLUTION INTRAVENOUS at 21:20

## 2020-10-26 RX ADMIN — METOPROLOL TARTRATE 5 MG: 1 INJECTION, SOLUTION INTRAVENOUS at 04:19

## 2020-10-26 RX ADMIN — HYDROMORPHONE HYDROCHLORIDE 1 MG: 2 INJECTION, SOLUTION INTRAMUSCULAR; INTRAVENOUS; SUBCUTANEOUS at 09:50

## 2020-10-26 RX ADMIN — HYDROMORPHONE HYDROCHLORIDE 1 MG: 2 INJECTION, SOLUTION INTRAMUSCULAR; INTRAVENOUS; SUBCUTANEOUS at 20:20

## 2020-10-26 RX ADMIN — METOPROLOL TARTRATE 5 MG: 1 INJECTION, SOLUTION INTRAVENOUS at 17:01

## 2020-10-26 RX ADMIN — Medication 10 ML: at 20:21

## 2020-10-26 RX ADMIN — BUDESONIDE 0.5 MG: 0.5 INHALANT RESPIRATORY (INHALATION) at 07:05

## 2020-10-26 RX ADMIN — BUDESONIDE 0.5 MG: 0.5 INHALANT RESPIRATORY (INHALATION) at 18:34

## 2020-10-26 RX ADMIN — HYDROMORPHONE HYDROCHLORIDE 1 MG: 2 INJECTION, SOLUTION INTRAMUSCULAR; INTRAVENOUS; SUBCUTANEOUS at 17:12

## 2020-10-26 RX ADMIN — HYDROMORPHONE HYDROCHLORIDE 1 MG: 2 INJECTION, SOLUTION INTRAMUSCULAR; INTRAVENOUS; SUBCUTANEOUS at 04:44

## 2020-10-26 RX ADMIN — METOPROLOL TARTRATE 5 MG: 1 INJECTION, SOLUTION INTRAVENOUS at 21:20

## 2020-10-26 RX ADMIN — INSULIN LISPRO 2 UNITS: 100 INJECTION, SOLUTION INTRAVENOUS; SUBCUTANEOUS at 21:24

## 2020-10-26 RX ADMIN — IPRATROPIUM BROMIDE AND ALBUTEROL SULFATE 3 ML: 2.5; .5 SOLUTION RESPIRATORY (INHALATION) at 11:30

## 2020-10-26 RX ADMIN — PIPERACILLIN AND TAZOBACTAM 4.5 G: 4; .5 INJECTION, POWDER, FOR SOLUTION INTRAVENOUS at 14:22

## 2020-10-26 RX ADMIN — SODIUM CHLORIDE, POTASSIUM CHLORIDE, SODIUM LACTATE AND CALCIUM CHLORIDE 125 ML/HR: 600; 310; 30; 20 INJECTION, SOLUTION INTRAVENOUS at 13:34

## 2020-10-26 RX ADMIN — IPRATROPIUM BROMIDE AND ALBUTEROL SULFATE 3 ML: 2.5; .5 SOLUTION RESPIRATORY (INHALATION) at 18:29

## 2020-10-26 RX ADMIN — METOPROLOL TARTRATE 5 MG: 1 INJECTION, SOLUTION INTRAVENOUS at 09:44

## 2020-10-26 RX ADMIN — HYDROMORPHONE HYDROCHLORIDE 1 MG: 2 INJECTION, SOLUTION INTRAMUSCULAR; INTRAVENOUS; SUBCUTANEOUS at 14:14

## 2020-10-26 NOTE — OUTREACH NOTE
General Surgery Week 3 Survey      Responses   [facility] patient discharged from?  [facility]   Does the patient have one of the following disease processes/diagnoses(primary or secondary)?  General Surgery   Week 3 attempt successful?  No   Revoke  Readmitted          Ira Tsang RN

## 2020-10-27 ENCOUNTER — ANESTHESIA (OUTPATIENT)
Dept: SURGERY | Facility: HOSPITAL | Age: 75
End: 2020-10-27

## 2020-10-27 LAB
ANION GAP SERPL CALCULATED.3IONS-SCNC: 8 MMOL/L (ref 5–15)
BUN SERPL-MCNC: 20 MG/DL (ref 8–23)
BUN/CREAT SERPL: 17.2 (ref 7–25)
CALCIUM SPEC-SCNC: 8.2 MG/DL (ref 8.6–10.5)
CHLORIDE SERPL-SCNC: 100 MMOL/L (ref 98–107)
CO2 SERPL-SCNC: 29 MMOL/L (ref 22–29)
CREAT SERPL-MCNC: 1.16 MG/DL (ref 0.76–1.27)
DEPRECATED RDW RBC AUTO: 49.4 FL (ref 37–54)
ERYTHROCYTE [DISTWIDTH] IN BLOOD BY AUTOMATED COUNT: 15.6 % (ref 12.3–15.4)
GFR SERPL CREATININE-BSD FRML MDRD: 62 ML/MIN/1.73
GLUCOSE BLDC GLUCOMTR-MCNC: 121 MG/DL (ref 70–105)
GLUCOSE BLDC GLUCOMTR-MCNC: 128 MG/DL (ref 70–105)
GLUCOSE BLDC GLUCOMTR-MCNC: 132 MG/DL (ref 70–105)
GLUCOSE BLDC GLUCOMTR-MCNC: 153 MG/DL (ref 70–105)
GLUCOSE SERPL-MCNC: 126 MG/DL (ref 65–99)
HCT VFR BLD AUTO: 38.5 % (ref 37.5–51)
HGB BLD-MCNC: 12.9 G/DL (ref 13–17.7)
MCH RBC QN AUTO: 30.2 PG (ref 26.6–33)
MCHC RBC AUTO-ENTMCNC: 33.5 G/DL (ref 31.5–35.7)
MCV RBC AUTO: 90.1 FL (ref 79–97)
PLATELET # BLD AUTO: 254 10*3/MM3 (ref 140–450)
PMV BLD AUTO: 8.3 FL (ref 6–12)
POTASSIUM SERPL-SCNC: 4.1 MMOL/L (ref 3.5–5.2)
RBC # BLD AUTO: 4.28 10*6/MM3 (ref 4.14–5.8)
SODIUM SERPL-SCNC: 137 MMOL/L (ref 136–145)
WBC # BLD AUTO: 6.9 10*3/MM3 (ref 3.4–10.8)

## 2020-10-27 PROCEDURE — 94799 UNLISTED PULMONARY SVC/PX: CPT

## 2020-10-27 PROCEDURE — 25010000002 HYDROMORPHONE PER 4 MG: Performed by: FAMILY MEDICINE

## 2020-10-27 PROCEDURE — 82962 GLUCOSE BLOOD TEST: CPT

## 2020-10-27 PROCEDURE — 63710000001 INSULIN LISPRO (HUMAN) PER 5 UNITS: Performed by: FAMILY MEDICINE

## 2020-10-27 PROCEDURE — 25010000002 DIGOXIN PER 500 MCG: Performed by: FAMILY MEDICINE

## 2020-10-27 PROCEDURE — 25010000002 PIPERACILLIN SOD-TAZOBACTAM PER 1 G: Performed by: FAMILY MEDICINE

## 2020-10-27 PROCEDURE — 80048 BASIC METABOLIC PNL TOTAL CA: CPT | Performed by: FAMILY MEDICINE

## 2020-10-27 PROCEDURE — 99233 SBSQ HOSP IP/OBS HIGH 50: CPT | Performed by: NURSE PRACTITIONER

## 2020-10-27 PROCEDURE — 85027 COMPLETE CBC AUTOMATED: CPT | Performed by: FAMILY MEDICINE

## 2020-10-27 RX ORDER — INSULIN LISPRO 100 [IU]/ML
0-7 INJECTION, SOLUTION INTRAVENOUS; SUBCUTANEOUS AS NEEDED
Status: DISCONTINUED | OUTPATIENT
Start: 2020-10-27 | End: 2020-10-29 | Stop reason: HOSPADM

## 2020-10-27 RX ORDER — INSULIN LISPRO 100 [IU]/ML
0-7 INJECTION, SOLUTION INTRAVENOUS; SUBCUTANEOUS
Status: DISCONTINUED | OUTPATIENT
Start: 2020-10-27 | End: 2020-10-29 | Stop reason: HOSPADM

## 2020-10-27 RX ADMIN — HYDROMORPHONE HYDROCHLORIDE 1 MG: 2 INJECTION, SOLUTION INTRAMUSCULAR; INTRAVENOUS; SUBCUTANEOUS at 05:59

## 2020-10-27 RX ADMIN — METOPROLOL TARTRATE 5 MG: 1 INJECTION, SOLUTION INTRAVENOUS at 10:31

## 2020-10-27 RX ADMIN — Medication 10 ML: at 10:40

## 2020-10-27 RX ADMIN — IPRATROPIUM BROMIDE AND ALBUTEROL SULFATE 3 ML: 2.5; .5 SOLUTION RESPIRATORY (INHALATION) at 06:46

## 2020-10-27 RX ADMIN — DIGOXIN 200 MCG: 250 INJECTION, SOLUTION INTRAMUSCULAR; INTRAVENOUS; PARENTERAL at 14:19

## 2020-10-27 RX ADMIN — IPRATROPIUM BROMIDE AND ALBUTEROL SULFATE 3 ML: 2.5; .5 SOLUTION RESPIRATORY (INHALATION) at 18:29

## 2020-10-27 RX ADMIN — BUDESONIDE 0.5 MG: 0.5 INHALANT RESPIRATORY (INHALATION) at 06:46

## 2020-10-27 RX ADMIN — PIPERACILLIN AND TAZOBACTAM 4.5 G: 4; .5 INJECTION, POWDER, FOR SOLUTION INTRAVENOUS at 14:14

## 2020-10-27 RX ADMIN — HYDROMORPHONE HYDROCHLORIDE 1 MG: 2 INJECTION, SOLUTION INTRAMUSCULAR; INTRAVENOUS; SUBCUTANEOUS at 03:02

## 2020-10-27 RX ADMIN — INSULIN LISPRO 2 UNITS: 100 INJECTION, SOLUTION INTRAVENOUS; SUBCUTANEOUS at 22:24

## 2020-10-27 RX ADMIN — METOPROLOL TARTRATE 5 MG: 1 INJECTION, SOLUTION INTRAVENOUS at 18:09

## 2020-10-27 RX ADMIN — HYDROMORPHONE HYDROCHLORIDE 1 MG: 2 INJECTION, SOLUTION INTRAMUSCULAR; INTRAVENOUS; SUBCUTANEOUS at 10:26

## 2020-10-27 RX ADMIN — PIPERACILLIN AND TAZOBACTAM 4.5 G: 4; .5 INJECTION, POWDER, FOR SOLUTION INTRAVENOUS at 22:14

## 2020-10-27 RX ADMIN — HYDROMORPHONE HYDROCHLORIDE 1 MG: 2 INJECTION, SOLUTION INTRAMUSCULAR; INTRAVENOUS; SUBCUTANEOUS at 18:10

## 2020-10-27 RX ADMIN — FAMOTIDINE 20 MG: 10 INJECTION, SOLUTION INTRAVENOUS at 21:12

## 2020-10-27 RX ADMIN — HYDROMORPHONE HYDROCHLORIDE 1 MG: 2 INJECTION, SOLUTION INTRAMUSCULAR; INTRAVENOUS; SUBCUTANEOUS at 14:18

## 2020-10-27 RX ADMIN — METOPROLOL TARTRATE 5 MG: 1 INJECTION, SOLUTION INTRAVENOUS at 22:24

## 2020-10-27 RX ADMIN — BENZONATATE 100 MG: 100 CAPSULE ORAL at 02:04

## 2020-10-27 RX ADMIN — BUDESONIDE 0.5 MG: 0.5 INHALANT RESPIRATORY (INHALATION) at 18:29

## 2020-10-27 RX ADMIN — METOPROLOL TARTRATE 5 MG: 1 INJECTION, SOLUTION INTRAVENOUS at 04:20

## 2020-10-27 RX ADMIN — PIPERACILLIN AND TAZOBACTAM 4.5 G: 4; .5 INJECTION, POWDER, FOR SOLUTION INTRAVENOUS at 05:59

## 2020-10-27 RX ADMIN — BENZONATATE 100 MG: 100 CAPSULE ORAL at 10:38

## 2020-10-27 RX ADMIN — FAMOTIDINE 20 MG: 10 INJECTION, SOLUTION INTRAVENOUS at 10:33

## 2020-10-27 RX ADMIN — HYDROMORPHONE HYDROCHLORIDE 1 MG: 2 INJECTION, SOLUTION INTRAMUSCULAR; INTRAVENOUS; SUBCUTANEOUS at 21:12

## 2020-10-28 LAB
ANION GAP SERPL CALCULATED.3IONS-SCNC: 9 MMOL/L (ref 5–15)
B PARAPERT DNA SPEC QL NAA+PROBE: NOT DETECTED
B PERT DNA SPEC QL NAA+PROBE: NOT DETECTED
BUN SERPL-MCNC: 13 MG/DL (ref 8–23)
BUN/CREAT SERPL: 11.2 (ref 7–25)
C PNEUM DNA NPH QL NAA+NON-PROBE: NOT DETECTED
CALCIUM SPEC-SCNC: 8.3 MG/DL (ref 8.6–10.5)
CHLORIDE SERPL-SCNC: 100 MMOL/L (ref 98–107)
CO2 SERPL-SCNC: 29 MMOL/L (ref 22–29)
CREAT SERPL-MCNC: 1.16 MG/DL (ref 0.76–1.27)
DEPRECATED RDW RBC AUTO: 49.4 FL (ref 37–54)
ERYTHROCYTE [DISTWIDTH] IN BLOOD BY AUTOMATED COUNT: 15.7 % (ref 12.3–15.4)
FLUAV H1 2009 PAND RNA NPH QL NAA+PROBE: NOT DETECTED
FLUAV H1 HA GENE NPH QL NAA+PROBE: NOT DETECTED
FLUAV H3 RNA NPH QL NAA+PROBE: NOT DETECTED
FLUAV SUBTYP SPEC NAA+PROBE: NOT DETECTED
FLUBV RNA ISLT QL NAA+PROBE: NOT DETECTED
GFR SERPL CREATININE-BSD FRML MDRD: 62 ML/MIN/1.73
GLUCOSE BLDC GLUCOMTR-MCNC: 110 MG/DL (ref 70–105)
GLUCOSE BLDC GLUCOMTR-MCNC: 120 MG/DL (ref 70–105)
GLUCOSE BLDC GLUCOMTR-MCNC: 158 MG/DL (ref 70–105)
GLUCOSE BLDC GLUCOMTR-MCNC: 167 MG/DL (ref 70–105)
GLUCOSE SERPL-MCNC: 137 MG/DL (ref 65–99)
HADV DNA SPEC NAA+PROBE: NOT DETECTED
HCOV 229E RNA SPEC QL NAA+PROBE: NOT DETECTED
HCOV HKU1 RNA SPEC QL NAA+PROBE: NOT DETECTED
HCOV NL63 RNA SPEC QL NAA+PROBE: NOT DETECTED
HCOV OC43 RNA SPEC QL NAA+PROBE: NOT DETECTED
HCT VFR BLD AUTO: 40.9 % (ref 37.5–51)
HGB BLD-MCNC: 13.7 G/DL (ref 13–17.7)
HMPV RNA NPH QL NAA+NON-PROBE: NOT DETECTED
HPIV1 RNA SPEC QL NAA+PROBE: NOT DETECTED
HPIV2 RNA SPEC QL NAA+PROBE: NOT DETECTED
HPIV3 RNA NPH QL NAA+PROBE: NOT DETECTED
HPIV4 P GENE NPH QL NAA+PROBE: NOT DETECTED
M PNEUMO IGG SER IA-ACNC: NOT DETECTED
MCH RBC QN AUTO: 30 PG (ref 26.6–33)
MCHC RBC AUTO-ENTMCNC: 33.6 G/DL (ref 31.5–35.7)
MCV RBC AUTO: 89.2 FL (ref 79–97)
MRSA DNA SPEC QL NAA+PROBE: NORMAL
PLATELET # BLD AUTO: 250 10*3/MM3 (ref 140–450)
PMV BLD AUTO: 7.9 FL (ref 6–12)
POTASSIUM SERPL-SCNC: 3.9 MMOL/L (ref 3.5–5.2)
RBC # BLD AUTO: 4.58 10*6/MM3 (ref 4.14–5.8)
RHINOVIRUS RNA SPEC NAA+PROBE: NOT DETECTED
RSV RNA NPH QL NAA+NON-PROBE: NOT DETECTED
SARS-COV-2 RNA NPH QL NAA+NON-PROBE: NOT DETECTED
SODIUM SERPL-SCNC: 138 MMOL/L (ref 136–145)
WBC # BLD AUTO: 8 10*3/MM3 (ref 3.4–10.8)

## 2020-10-28 PROCEDURE — 25010000002 DIGOXIN PER 500 MCG: Performed by: FAMILY MEDICINE

## 2020-10-28 PROCEDURE — 25010000002 HYDROMORPHONE PER 4 MG: Performed by: FAMILY MEDICINE

## 2020-10-28 PROCEDURE — 87641 MR-STAPH DNA AMP PROBE: CPT | Performed by: PODIATRIST

## 2020-10-28 PROCEDURE — 63710000001 INSULIN LISPRO (HUMAN) PER 5 UNITS: Performed by: FAMILY MEDICINE

## 2020-10-28 PROCEDURE — 94799 UNLISTED PULMONARY SVC/PX: CPT

## 2020-10-28 PROCEDURE — 25010000002 PIPERACILLIN SOD-TAZOBACTAM PER 1 G: Performed by: FAMILY MEDICINE

## 2020-10-28 PROCEDURE — 99233 SBSQ HOSP IP/OBS HIGH 50: CPT | Performed by: NURSE PRACTITIONER

## 2020-10-28 PROCEDURE — 82962 GLUCOSE BLOOD TEST: CPT

## 2020-10-28 PROCEDURE — 85027 COMPLETE CBC AUTOMATED: CPT | Performed by: FAMILY MEDICINE

## 2020-10-28 PROCEDURE — 80048 BASIC METABOLIC PNL TOTAL CA: CPT | Performed by: FAMILY MEDICINE

## 2020-10-28 PROCEDURE — 25010000002 ONDANSETRON PER 1 MG: Performed by: FAMILY MEDICINE

## 2020-10-28 PROCEDURE — 0202U NFCT DS 22 TRGT SARS-COV-2: CPT | Performed by: PODIATRIST

## 2020-10-28 RX ORDER — HYDROCODONE BITARTRATE AND ACETAMINOPHEN 5; 325 MG/1; MG/1
1 TABLET ORAL EVERY 6 HOURS PRN
Status: DISCONTINUED | OUTPATIENT
Start: 2020-10-28 | End: 2020-10-29 | Stop reason: HOSPADM

## 2020-10-28 RX ORDER — HYDROMORPHONE HCL 110MG/55ML
0.5 PATIENT CONTROLLED ANALGESIA SYRINGE INTRAVENOUS
Status: DISCONTINUED | OUTPATIENT
Start: 2020-10-28 | End: 2020-10-29 | Stop reason: HOSPADM

## 2020-10-28 RX ADMIN — GUAIFENESIN AND DEXTROMETHORPHAN 5 ML: 100; 10 SYRUP ORAL at 20:30

## 2020-10-28 RX ADMIN — HYDROMORPHONE HYDROCHLORIDE 1 MG: 2 INJECTION, SOLUTION INTRAMUSCULAR; INTRAVENOUS; SUBCUTANEOUS at 00:15

## 2020-10-28 RX ADMIN — HYDROMORPHONE HYDROCHLORIDE 0.5 MG: 2 INJECTION, SOLUTION INTRAMUSCULAR; INTRAVENOUS; SUBCUTANEOUS at 09:51

## 2020-10-28 RX ADMIN — HYDROMORPHONE HYDROCHLORIDE 0.5 MG: 2 INJECTION, SOLUTION INTRAMUSCULAR; INTRAVENOUS; SUBCUTANEOUS at 20:30

## 2020-10-28 RX ADMIN — HYDROMORPHONE HYDROCHLORIDE 0.5 MG: 2 INJECTION, SOLUTION INTRAMUSCULAR; INTRAVENOUS; SUBCUTANEOUS at 12:43

## 2020-10-28 RX ADMIN — FAMOTIDINE 20 MG: 10 INJECTION, SOLUTION INTRAVENOUS at 09:25

## 2020-10-28 RX ADMIN — IPRATROPIUM BROMIDE AND ALBUTEROL SULFATE 3 ML: 2.5; .5 SOLUTION RESPIRATORY (INHALATION) at 20:14

## 2020-10-28 RX ADMIN — PIPERACILLIN AND TAZOBACTAM 4.5 G: 4; .5 INJECTION, POWDER, FOR SOLUTION INTRAVENOUS at 05:59

## 2020-10-28 RX ADMIN — METOPROLOL TARTRATE 5 MG: 1 INJECTION, SOLUTION INTRAVENOUS at 22:58

## 2020-10-28 RX ADMIN — FAMOTIDINE 20 MG: 10 INJECTION, SOLUTION INTRAVENOUS at 20:30

## 2020-10-28 RX ADMIN — BUDESONIDE 0.5 MG: 0.5 INHALANT RESPIRATORY (INHALATION) at 20:14

## 2020-10-28 RX ADMIN — BUDESONIDE 0.5 MG: 0.5 INHALANT RESPIRATORY (INHALATION) at 07:38

## 2020-10-28 RX ADMIN — ONDANSETRON 4 MG: 2 INJECTION INTRAMUSCULAR; INTRAVENOUS at 14:45

## 2020-10-28 RX ADMIN — PIPERACILLIN AND TAZOBACTAM 4.5 G: 4; .5 INJECTION, POWDER, FOR SOLUTION INTRAVENOUS at 13:52

## 2020-10-28 RX ADMIN — HYDROMORPHONE HYDROCHLORIDE 0.5 MG: 2 INJECTION, SOLUTION INTRAMUSCULAR; INTRAVENOUS; SUBCUTANEOUS at 23:30

## 2020-10-28 RX ADMIN — PIPERACILLIN AND TAZOBACTAM 4.5 G: 4; .5 INJECTION, POWDER, FOR SOLUTION INTRAVENOUS at 22:58

## 2020-10-28 RX ADMIN — METOPROLOL TARTRATE 5 MG: 1 INJECTION, SOLUTION INTRAVENOUS at 10:52

## 2020-10-28 RX ADMIN — INSULIN LISPRO 2 UNITS: 100 INJECTION, SOLUTION INTRAVENOUS; SUBCUTANEOUS at 12:44

## 2020-10-28 RX ADMIN — HYDROMORPHONE HYDROCHLORIDE 0.5 MG: 2 INJECTION, SOLUTION INTRAMUSCULAR; INTRAVENOUS; SUBCUTANEOUS at 16:11

## 2020-10-28 RX ADMIN — Medication 10 ML: at 09:25

## 2020-10-28 RX ADMIN — METOPROLOL TARTRATE 5 MG: 1 INJECTION, SOLUTION INTRAVENOUS at 16:11

## 2020-10-28 RX ADMIN — IPRATROPIUM BROMIDE AND ALBUTEROL SULFATE 3 ML: 2.5; .5 SOLUTION RESPIRATORY (INHALATION) at 11:25

## 2020-10-28 RX ADMIN — HYDROMORPHONE HYDROCHLORIDE 1 MG: 2 INJECTION, SOLUTION INTRAMUSCULAR; INTRAVENOUS; SUBCUTANEOUS at 06:42

## 2020-10-28 RX ADMIN — SODIUM CHLORIDE, POTASSIUM CHLORIDE, SODIUM LACTATE AND CALCIUM CHLORIDE 125 ML/HR: 600; 310; 30; 20 INJECTION, SOLUTION INTRAVENOUS at 20:31

## 2020-10-28 RX ADMIN — METOPROLOL TARTRATE 5 MG: 1 INJECTION, SOLUTION INTRAVENOUS at 03:39

## 2020-10-28 RX ADMIN — Medication 10 ML: at 20:31

## 2020-10-28 RX ADMIN — DIGOXIN 200 MCG: 250 INJECTION, SOLUTION INTRAMUSCULAR; INTRAVENOUS; PARENTERAL at 12:43

## 2020-10-28 RX ADMIN — GUAIFENESIN AND DEXTROMETHORPHAN 5 ML: 100; 10 SYRUP ORAL at 13:27

## 2020-10-28 RX ADMIN — HYDROMORPHONE HYDROCHLORIDE 1 MG: 2 INJECTION, SOLUTION INTRAMUSCULAR; INTRAVENOUS; SUBCUTANEOUS at 03:40

## 2020-10-28 RX ADMIN — IPRATROPIUM BROMIDE AND ALBUTEROL SULFATE 3 ML: 2.5; .5 SOLUTION RESPIRATORY (INHALATION) at 07:32

## 2020-10-28 RX ADMIN — BENZONATATE 100 MG: 100 CAPSULE ORAL at 17:53

## 2020-10-28 RX ADMIN — IPRATROPIUM BROMIDE AND ALBUTEROL SULFATE 3 ML: 2.5; .5 SOLUTION RESPIRATORY (INHALATION) at 15:00

## 2020-10-29 ENCOUNTER — ANESTHESIA EVENT (OUTPATIENT)
Dept: PERIOP | Facility: HOSPITAL | Age: 75
End: 2020-10-29

## 2020-10-29 ENCOUNTER — ANESTHESIA (OUTPATIENT)
Dept: PERIOP | Facility: HOSPITAL | Age: 75
End: 2020-10-29

## 2020-10-29 VITALS
RESPIRATION RATE: 17 BRPM | SYSTOLIC BLOOD PRESSURE: 132 MMHG | HEART RATE: 70 BPM | OXYGEN SATURATION: 94 % | DIASTOLIC BLOOD PRESSURE: 68 MMHG | BODY MASS INDEX: 35.47 KG/M2 | TEMPERATURE: 98 F | HEIGHT: 73 IN | WEIGHT: 267.64 LBS

## 2020-10-29 PROBLEM — E11.621 DIABETIC ULCER OF RIGHT MIDFOOT ASSOCIATED WITH TYPE 2 DIABETES MELLITUS, WITH BONE INVOLVEMENT WITHOUT EVIDENCE OF NECROSIS (HCC): Status: ACTIVE | Noted: 2020-10-29

## 2020-10-29 PROBLEM — L97.416 DIABETIC ULCER OF RIGHT MIDFOOT ASSOCIATED WITH TYPE 2 DIABETES MELLITUS, WITH BONE INVOLVEMENT WITHOUT EVIDENCE OF NECROSIS: Status: ACTIVE | Noted: 2020-10-29

## 2020-10-29 LAB
ANION GAP SERPL CALCULATED.3IONS-SCNC: 7 MMOL/L (ref 5–15)
BUN SERPL-MCNC: 10 MG/DL (ref 8–23)
BUN/CREAT SERPL: 9.3 (ref 7–25)
CALCIUM SPEC-SCNC: 8.8 MG/DL (ref 8.6–10.5)
CHLORIDE SERPL-SCNC: 100 MMOL/L (ref 98–107)
CO2 SERPL-SCNC: 29 MMOL/L (ref 22–29)
CREAT SERPL-MCNC: 1.08 MG/DL (ref 0.76–1.27)
DIGOXIN SERPL-MCNC: 0.7 NG/ML (ref 0.6–1.2)
GFR SERPL CREATININE-BSD FRML MDRD: 67 ML/MIN/1.73
GLUCOSE BLDC GLUCOMTR-MCNC: 110 MG/DL (ref 70–105)
GLUCOSE BLDC GLUCOMTR-MCNC: 121 MG/DL (ref 70–105)
GLUCOSE BLDC GLUCOMTR-MCNC: 121 MG/DL (ref 70–105)
GLUCOSE BLDC GLUCOMTR-MCNC: 135 MG/DL (ref 70–105)
GLUCOSE SERPL-MCNC: 122 MG/DL (ref 65–99)
POTASSIUM SERPL-SCNC: 3.9 MMOL/L (ref 3.5–5.2)
SODIUM SERPL-SCNC: 136 MMOL/L (ref 136–145)

## 2020-10-29 PROCEDURE — 87205 SMEAR GRAM STAIN: CPT | Performed by: PODIATRIST

## 2020-10-29 PROCEDURE — 25010000002 HYDROMORPHONE PER 4 MG: Performed by: FAMILY MEDICINE

## 2020-10-29 PROCEDURE — 94799 UNLISTED PULMONARY SVC/PX: CPT

## 2020-10-29 PROCEDURE — 80048 BASIC METABOLIC PNL TOTAL CA: CPT | Performed by: FAMILY MEDICINE

## 2020-10-29 PROCEDURE — 80162 ASSAY OF DIGOXIN TOTAL: CPT | Performed by: FAMILY MEDICINE

## 2020-10-29 PROCEDURE — 87015 SPECIMEN INFECT AGNT CONCNTJ: CPT | Performed by: PODIATRIST

## 2020-10-29 PROCEDURE — 87070 CULTURE OTHR SPECIMN AEROBIC: CPT | Performed by: PODIATRIST

## 2020-10-29 PROCEDURE — 97165 OT EVAL LOW COMPLEX 30 MIN: CPT

## 2020-10-29 PROCEDURE — 82962 GLUCOSE BLOOD TEST: CPT

## 2020-10-29 PROCEDURE — 87075 CULTR BACTERIA EXCEPT BLOOD: CPT | Performed by: PODIATRIST

## 2020-10-29 PROCEDURE — 25010000002 PIPERACILLIN SOD-TAZOBACTAM PER 1 G: Performed by: FAMILY MEDICINE

## 2020-10-29 PROCEDURE — 25010000002 DIGOXIN PER 500 MCG: Performed by: PODIATRIST

## 2020-10-29 PROCEDURE — 63710000001 ONDANSETRON PER 8 MG: Performed by: FAMILY MEDICINE

## 2020-10-29 PROCEDURE — 25010000002 PROPOFOL 10 MG/ML EMULSION: Performed by: NURSE ANESTHETIST, CERTIFIED REGISTERED

## 2020-10-29 PROCEDURE — 0HRMXK3 REPLACEMENT OF RIGHT FOOT SKIN WITH NONAUTOLOGOUS TISSUE SUBSTITUTE, FULL THICKNESS, EXTERNAL APPROACH: ICD-10-PCS | Performed by: PODIATRIST

## 2020-10-29 DEVICE — IMPLANTABLE DEVICE: Type: IMPLANTABLE DEVICE | Status: FUNCTIONAL

## 2020-10-29 RX ORDER — BUPIVACAINE HYDROCHLORIDE 2.5 MG/ML
INJECTION, SOLUTION EPIDURAL; INFILTRATION; INTRACAUDAL AS NEEDED
Status: DISCONTINUED | OUTPATIENT
Start: 2020-10-29 | End: 2020-10-29 | Stop reason: HOSPADM

## 2020-10-29 RX ORDER — ONDANSETRON 2 MG/ML
4 INJECTION INTRAMUSCULAR; INTRAVENOUS ONCE AS NEEDED
Status: DISCONTINUED | OUTPATIENT
Start: 2020-10-29 | End: 2020-10-29

## 2020-10-29 RX ORDER — ACETAMINOPHEN 325 MG/1
650 TABLET ORAL ONCE AS NEEDED
Status: DISCONTINUED | OUTPATIENT
Start: 2020-10-29 | End: 2020-10-29

## 2020-10-29 RX ORDER — LIDOCAINE HYDROCHLORIDE 10 MG/ML
INJECTION, SOLUTION EPIDURAL; INFILTRATION; INTRACAUDAL; PERINEURAL AS NEEDED
Status: DISCONTINUED | OUTPATIENT
Start: 2020-10-29 | End: 2020-10-29 | Stop reason: SURG

## 2020-10-29 RX ORDER — FENTANYL CITRATE 50 UG/ML
50 INJECTION, SOLUTION INTRAMUSCULAR; INTRAVENOUS
Status: DISCONTINUED | OUTPATIENT
Start: 2020-10-29 | End: 2020-10-29

## 2020-10-29 RX ORDER — ASPIRIN 325 MG
325 TABLET, DELAYED RELEASE (ENTERIC COATED) ORAL EVERY 12 HOURS SCHEDULED
Status: DISCONTINUED | OUTPATIENT
Start: 2020-10-30 | End: 2020-10-29 | Stop reason: HOSPADM

## 2020-10-29 RX ORDER — PROPOFOL 10 MG/ML
VIAL (ML) INTRAVENOUS AS NEEDED
Status: DISCONTINUED | OUTPATIENT
Start: 2020-10-29 | End: 2020-10-29 | Stop reason: SURG

## 2020-10-29 RX ORDER — HYDROMORPHONE HCL 110MG/55ML
0.2 PATIENT CONTROLLED ANALGESIA SYRINGE INTRAVENOUS
Status: DISCONTINUED | OUTPATIENT
Start: 2020-10-29 | End: 2020-10-29

## 2020-10-29 RX ORDER — SODIUM CHLORIDE 9 MG/ML
INJECTION, SOLUTION INTRAVENOUS CONTINUOUS PRN
Status: DISCONTINUED | OUTPATIENT
Start: 2020-10-29 | End: 2020-10-29 | Stop reason: SURG

## 2020-10-29 RX ORDER — ACETAMINOPHEN 650 MG/1
650 SUPPOSITORY RECTAL ONCE AS NEEDED
Status: DISCONTINUED | OUTPATIENT
Start: 2020-10-29 | End: 2020-10-29

## 2020-10-29 RX ORDER — ONDANSETRON 4 MG/1
4 TABLET, FILM COATED ORAL EVERY 6 HOURS PRN
Status: DISCONTINUED | OUTPATIENT
Start: 2020-10-29 | End: 2020-10-29 | Stop reason: HOSPADM

## 2020-10-29 RX ORDER — ENALAPRILAT 2.5 MG/2ML
1.25 INJECTION INTRAVENOUS EVERY 6 HOURS
Status: DISCONTINUED | OUTPATIENT
Start: 2020-10-29 | End: 2020-10-29 | Stop reason: HOSPADM

## 2020-10-29 RX ADMIN — PROPOFOL 30 MG: 10 INJECTION, EMULSION INTRAVENOUS at 09:15

## 2020-10-29 RX ADMIN — METOPROLOL TARTRATE 5 MG: 1 INJECTION, SOLUTION INTRAVENOUS at 10:49

## 2020-10-29 RX ADMIN — ONDANSETRON HYDROCHLORIDE 4 MG: 4 TABLET, FILM COATED ORAL at 10:49

## 2020-10-29 RX ADMIN — PROPOFOL 30 MG: 10 INJECTION, EMULSION INTRAVENOUS at 09:10

## 2020-10-29 RX ADMIN — PROPOFOL 40 MG: 10 INJECTION, EMULSION INTRAVENOUS at 09:05

## 2020-10-29 RX ADMIN — BENZONATATE 100 MG: 100 CAPSULE ORAL at 04:57

## 2020-10-29 RX ADMIN — PIPERACILLIN AND TAZOBACTAM 4.5 G: 4; .5 INJECTION, POWDER, FOR SOLUTION INTRAVENOUS at 06:11

## 2020-10-29 RX ADMIN — HYDROCODONE BITARTRATE AND ACETAMINOPHEN 1 TABLET: 5; 325 TABLET ORAL at 10:49

## 2020-10-29 RX ADMIN — IPRATROPIUM BROMIDE AND ALBUTEROL SULFATE 3 ML: 2.5; .5 SOLUTION RESPIRATORY (INHALATION) at 07:35

## 2020-10-29 RX ADMIN — DIGOXIN 200 MCG: 250 INJECTION, SOLUTION INTRAMUSCULAR; INTRAVENOUS; PARENTERAL at 11:49

## 2020-10-29 RX ADMIN — PROPOFOL 30 MG: 10 INJECTION, EMULSION INTRAVENOUS at 09:13

## 2020-10-29 RX ADMIN — SODIUM CHLORIDE: 0.9 INJECTION, SOLUTION INTRAVENOUS at 08:49

## 2020-10-29 RX ADMIN — HYDROMORPHONE HYDROCHLORIDE 0.5 MG: 2 INJECTION, SOLUTION INTRAMUSCULAR; INTRAVENOUS; SUBCUTANEOUS at 03:09

## 2020-10-29 RX ADMIN — PROPOFOL 50 MG: 10 INJECTION, EMULSION INTRAVENOUS at 09:03

## 2020-10-29 RX ADMIN — LIDOCAINE HYDROCHLORIDE 40 MG: 10 INJECTION, SOLUTION EPIDURAL; INFILTRATION; INTRACAUDAL; PERINEURAL at 09:03

## 2020-10-29 RX ADMIN — BUDESONIDE 0.5 MG: 0.5 INHALANT RESPIRATORY (INHALATION) at 07:35

## 2020-10-29 RX ADMIN — METOPROLOL TARTRATE 5 MG: 1 INJECTION, SOLUTION INTRAVENOUS at 03:13

## 2020-10-29 RX ADMIN — PROPOFOL 20 MG: 10 INJECTION, EMULSION INTRAVENOUS at 09:07

## 2020-10-29 RX ADMIN — SODIUM CHLORIDE: 0.9 INJECTION, SOLUTION INTRAVENOUS at 09:15

## 2020-10-29 RX ADMIN — ENALAPRILAT 1.25 MG: 2.5 INJECTION INTRAVENOUS at 07:51

## 2020-10-29 RX ADMIN — HYDROMORPHONE HYDROCHLORIDE 0.5 MG: 2 INJECTION, SOLUTION INTRAMUSCULAR; INTRAVENOUS; SUBCUTANEOUS at 06:11

## 2020-10-29 NOTE — ANESTHESIA POSTPROCEDURE EVALUATION
Patient: Ro Nathan    Procedure Summary     Date: 10/29/20 Room / Location: [facility] OR 08 / [facility] MAIN OR    Anesthesia Start: 0851 Anesthesia Stop: 0932    Procedure: Preparation and debridement of foot wound with application of Integra wound graft, right foot. (Right Leg Lower) Diagnosis:       Diabetic ulcer of other part of right foot associated with type 2 diabetes mellitus, with fat layer exposed (CMS/HCC)      (Diabetic ulcer of other part of right foot associated with type 2 diabetes mellitus, with fat layer exposed (CMS/HCC) [E11.621, L97.512])    Surgeon: Josef Egan DPM Provider: Leeanne Harris MD    Anesthesia Type: MAC ASA Status: 3          Anesthesia Type: MAC    Vitals  Vitals Value Taken Time   /64 10/29/20 0955   Temp     Pulse 70 10/29/20 0955   Resp 13 10/29/20 0955   SpO2 99 % 10/29/20 0955           Post Anesthesia Care and Evaluation    Patient location during evaluation: PACU  Patient participation: complete - patient participated  Level of consciousness: awake  Pain score: 0 (See nurse's notes for pain score)  Pain management: adequate  Airway patency: patent  Anesthetic complications: No anesthetic complications  PONV Status: none  Cardiovascular status: acceptable  Respiratory status: acceptable  Hydration status: acceptable    Comments: Patient seen and examined postoperatively; vital signs stable; SpO2 greater than or equal to 90%; cardiopulmonary status stable; nausea/vomiting adequately controlled; pain adequately controlled; no apparent anesthesia complications; patient discharged from anesthesia care when discharge criteria were met

## 2020-10-29 NOTE — ANESTHESIA PREPROCEDURE EVALUATION
Anesthesia Evaluation     Patient summary reviewed and Nursing notes reviewed   NPO Solid Status: > 8 hours  NPO Liquid Status: > 8 hours           Airway   Mallampati: II  TM distance: >3 FB  Neck ROM: full  No difficulty expected  Dental - normal exam   (+) edentulous    Pulmonary - normal exam   (+) COPD, shortness of breath, sleep apnea,   Cardiovascular - normal exam    (+) hypertension, CAD, dysrhythmias Atrial Fib, angina, CHF , PVD,       Neuro/Psych  (+) numbness,     GI/Hepatic/Renal/Endo    (+) obesity,  GERD,  liver disease, renal disease, diabetes mellitus poorly controlled,     Musculoskeletal     Abdominal  - normal exam    Bowel sounds: normal.   Substance History - negative use     OB/GYN negative ob/gyn ROS         Other   arthritis,                      Anesthesia Plan    ASA 3     MAC     intravenous induction     Anesthetic plan, all risks, benefits, and alternatives have been provided, discussed and informed consent has been obtained with: patient.    Plan discussed with CRNA.

## 2020-10-30 ENCOUNTER — READMISSION MANAGEMENT (OUTPATIENT)
Dept: CALL CENTER | Facility: HOSPITAL | Age: 75
End: 2020-10-30

## 2020-10-30 NOTE — OUTREACH NOTE
Prep Survey      Responses   [facility] facility patient discharged from?  [facility]   Is LACE score < 7 ?  No   Eligibility  Readm Mgmt   Discharge diagnosis  Ventral hernia with obstruction, acute partial SBO, diabetic foot ulcer right foot [s/p Preparation and debridement of foot wound with application of Integra wound graft, right foot.]   Does the patient have one of the following disease processes/diagnoses(primary or secondary)?  General Surgery   Does the patient have Home health ordered?  No   Is there a DME ordered?  Yes   What DME was ordered?  Rolling walker from [facility]   Comments regarding appointments  Needs f/u with PCP, all other appts scheduled per AVS   Medication alerts for this patient  continue aspirin with changes and continue prasugrel   Prep survey completed?  Yes          Kamini Smith RN

## 2020-10-31 ENCOUNTER — READMISSION MANAGEMENT (OUTPATIENT)
Dept: CALL CENTER | Facility: HOSPITAL | Age: 75
End: 2020-10-31

## 2020-10-31 NOTE — OUTREACH NOTE
General Surgery Week 1 Survey      Responses   [facility] patient discharged from?  [facility]   Does the patient have one of the following disease processes/diagnoses(primary or secondary)?  General Surgery   Week 1 attempt successful?  Yes   Call start time  1701   Call end time  1705   Discharge diagnosis  Ventral hernia with obstruction, acute partial SBO, diabetic foot ulcer right foot   Person spoke with today (if not patient) and relationship  Meghan, wife   Meds reviewed with patient/caregiver?  Yes   Is the patient having any side effects they believe may be caused by any medication additions or changes?  No   Does the patient have all medications related to this admission filled (includes all antibiotics, pain medications, etc.)  Yes   Is the patient taking all medications as directed (includes completed medication regime)?  Yes   Does the patient have a follow up appointment scheduled with their surgeon?  Yes   Has the patient kept scheduled appointments due by today?  N/A   Has home health visited the patient within 72 hours of discharge?  N/A   Psychosocial issues?  No   Comments  wife states wound still covered with dsg, will have removed at appt on 11/5/20   Did the patient receive a copy of their discharge instructions?  Yes   Nursing interventions  Reviewed instructions with patient   What is the patient's perception of their health status since discharge?  Improving   Nursing interventions  Nurse provided patient education   Is the patient /caregiver able to teach back basic post-op care?  Continue use of incentive spirometry at least 1 week post discharge, Practice 'cough and deep breath', Drive as instructed by MD in discharge instructions, Take showers only when approved by MD-sponge bathe until then, No tub bath, swimming, or hot tub until instructed by MD, Keep incision areas clean,dry and protected, Do not remove steri-strips, Lifting as instructed by MD in discharge instructions   Is the  patient/caregiver able to teach back signs and symptoms of incisional infection?  Increased redness, swelling or pain at the incisonal site, Increased drainage or bleeding, Incisional warmth, Pus or odor from incision, Fever   Is the patient/caregiver able to teach back steps to recovery at home?  Set small, achievable goals for return to baseline health, Rest and rebuild strength, gradually increase activity   Is the patient/caregiver able to teach back the hierarchy of who to call/visit for symptoms/problems? PCP, Specialist, Home health nurse, Urgent Care, ED, 911  Yes   Additional teach back comments  wife states pt sleeps frequently, using Respirex   Week 1 call completed?  Yes          Gely Gautam, RN

## 2020-11-01 LAB
BACTERIA SPEC AEROBE CULT: NORMAL
GRAM STN SPEC: NORMAL
GRAM STN SPEC: NORMAL

## 2020-11-02 LAB — FUNGUS WND CULT: NORMAL

## 2020-11-03 LAB — BACTERIA SPEC ANAEROBE CULT: NORMAL

## 2020-11-09 ENCOUNTER — READMISSION MANAGEMENT (OUTPATIENT)
Dept: CALL CENTER | Facility: HOSPITAL | Age: 75
End: 2020-11-09

## 2020-11-09 NOTE — OUTREACH NOTE
General Surgery Week 2 Survey      Responses   [facility] patient discharged from?  [facility]   Does the patient have one of the following disease processes/diagnoses(primary or secondary)?  General Surgery   Week 2 attempt successful?  Yes   Call start time  1208   Call end time  1210   Discharge diagnosis  Ventral hernia with obstruction, acute partial SBO, diabetic foot ulcer right foot   Is patient permission given to speak with other caregiver?  Yes   Person spoke with today (if not patient) and relationship  Meghan, wife   Meds reviewed with patient/caregiver?  Yes   Is the patient having any side effects they believe may be caused by any medication additions or changes?  No   Does the patient have all medications related to this admission filled (includes all antibiotics, pain medications, etc.)  Yes   Is the patient taking all medications as directed (includes completed medication regime)?  Yes   Does the patient have a follow up appointment scheduled with their surgeon?  Yes   Has the patient kept scheduled appointments due by today?  Yes   Has home health visited the patient within 72 hours of discharge?  N/A   Psychosocial issues?  No   Did the patient receive a copy of their discharge instructions?  Yes   Nursing interventions  Reviewed instructions with patient   What is the patient's perception of their health status since discharge?  Improving   Nursing interventions  Nurse provided patient education   Is the patient /caregiver able to teach back basic post-op care?  Continue use of incentive spirometry at least 1 week post discharge, Practice 'cough and deep breath', Drive as instructed by MD in discharge instructions, Take showers only when approved by MD-sponge bathe until then, No tub bath, swimming, or hot tub until instructed by MD, Keep incision areas clean,dry and protected, Lifting as instructed by MD in discharge instructions   Is the patient/caregiver able to teach back signs and symptoms  of incisional infection?  Increased redness, swelling or pain at the incisonal site, Increased drainage or bleeding, Incisional warmth, Pus or odor from incision, Fever   Is the patient/caregiver able to teach back steps to recovery at home?  Set small, achievable goals for return to baseline health, Rest and rebuild strength, gradually increase activity   If the patient is a current smoker, are they able to teach back resources for cessation?  Not a smoker   Is the patient/caregiver able to teach back the hierarchy of who to call/visit for symptoms/problems? PCP, Specialist, Home health nurse, Urgent Care, ED, 911  Yes   Week 2 call completed?  Yes          Rodney Robles RN

## 2020-11-16 LAB
MYCOBACTERIUM SPEC CULT: NORMAL
NIGHT BLUE STAIN TISS: NORMAL

## 2020-11-17 ENCOUNTER — READMISSION MANAGEMENT (OUTPATIENT)
Dept: CALL CENTER | Facility: HOSPITAL | Age: 75
End: 2020-11-17

## 2020-11-17 NOTE — OUTREACH NOTE
General Surgery Week 3 Survey      Responses   [facility] patient discharged from?  [facility]   Does the patient have one of the following disease processes/diagnoses(primary or secondary)?  General Surgery   Week 3 attempt successful?  No   Unsuccessful attempts  Attempt 1   Rescheduled  Revoked          Muriel Whitaker RN

## 2020-12-02 ENCOUNTER — OFFICE VISIT (OUTPATIENT)
Dept: WOUND CARE | Facility: HOSPITAL | Age: 75
End: 2020-12-02

## 2020-12-02 DIAGNOSIS — L97.512 NON-PRESSURE CHRONIC ULCER OF OTHER PART OF RIGHT FOOT WITH FAT LAYER EXPOSED (HCC): ICD-10-CM

## 2020-12-02 DIAGNOSIS — E11.42 TYPE 2 DIABETES MELLITUS WITH DIABETIC POLYNEUROPATHY, UNSPECIFIED WHETHER LONG TERM INSULIN USE (HCC): ICD-10-CM

## 2020-12-02 PROCEDURE — 99213 OFFICE O/P EST LOW 20 MIN: CPT | Performed by: PODIATRIST

## 2020-12-02 PROCEDURE — 11042 DBRDMT SUBQ TIS 1ST 20SQCM/<: CPT | Performed by: PODIATRIST

## 2020-12-16 ENCOUNTER — OFFICE VISIT (OUTPATIENT)
Dept: WOUND CARE | Facility: HOSPITAL | Age: 75
End: 2020-12-16

## 2020-12-16 DIAGNOSIS — E11.42 TYPE 2 DIABETES MELLITUS WITH DIABETIC POLYNEUROPATHY, UNSPECIFIED WHETHER LONG TERM INSULIN USE (HCC): ICD-10-CM

## 2020-12-16 DIAGNOSIS — L97.512 NON-PRESSURE CHRONIC ULCER OF OTHER PART OF RIGHT FOOT WITH FAT LAYER EXPOSED (HCC): ICD-10-CM

## 2020-12-16 PROCEDURE — 11042 DBRDMT SUBQ TIS 1ST 20SQCM/<: CPT | Performed by: PODIATRIST

## 2020-12-21 ENCOUNTER — HOSPITAL ENCOUNTER (INPATIENT)
Facility: HOSPITAL | Age: 75
LOS: 6 days | Discharge: HOME | End: 2020-12-28
Attending: FAMILY MEDICINE | Admitting: FAMILY MEDICINE

## 2020-12-21 ENCOUNTER — APPOINTMENT (OUTPATIENT)
Dept: CT IMAGING | Facility: HOSPITAL | Age: 75
End: 2020-12-21

## 2020-12-21 ENCOUNTER — APPOINTMENT (OUTPATIENT)
Dept: GENERAL RADIOLOGY | Facility: HOSPITAL | Age: 75
End: 2020-12-21

## 2020-12-21 DIAGNOSIS — Z20.822 COVID-19 RULED OUT: ICD-10-CM

## 2020-12-21 DIAGNOSIS — R06.09 DYSPNEA ON EXERTION: Primary | ICD-10-CM

## 2020-12-21 LAB
ALBUMIN SERPL-MCNC: 3.7 G/DL (ref 3.5–5.2)
ALBUMIN/GLOB SERPL: 1.2 G/DL
ALP SERPL-CCNC: 163 U/L (ref 39–117)
ALT SERPL W P-5'-P-CCNC: 10 U/L (ref 1–41)
ANION GAP SERPL CALCULATED.3IONS-SCNC: 10 MMOL/L (ref 5–15)
AST SERPL-CCNC: 19 U/L (ref 1–40)
B PARAPERT DNA SPEC QL NAA+PROBE: NOT DETECTED
B PERT DNA SPEC QL NAA+PROBE: NOT DETECTED
BASOPHILS # BLD AUTO: 0.1 10*3/MM3 (ref 0–0.2)
BASOPHILS NFR BLD AUTO: 1.3 % (ref 0–1.5)
BILIRUB SERPL-MCNC: 0.6 MG/DL (ref 0–1.2)
BUN SERPL-MCNC: 22 MG/DL (ref 8–23)
BUN/CREAT SERPL: 17.1 (ref 7–25)
C PNEUM DNA NPH QL NAA+NON-PROBE: NOT DETECTED
CALCIUM SPEC-SCNC: 9.2 MG/DL (ref 8.6–10.5)
CHLORIDE SERPL-SCNC: 101 MMOL/L (ref 98–107)
CO2 SERPL-SCNC: 24 MMOL/L (ref 22–29)
CREAT SERPL-MCNC: 1.29 MG/DL (ref 0.76–1.27)
CRP SERPL-MCNC: 0.7 MG/DL (ref 0–0.5)
D DIMER PPP FEU-MCNC: 0.65 MG/L (FEU) (ref 0–0.59)
DEPRECATED RDW RBC AUTO: 46.4 FL (ref 37–54)
DIGOXIN SERPL-MCNC: <0.3 NG/ML (ref 0.6–1.2)
EOSINOPHIL # BLD AUTO: 0.9 10*3/MM3 (ref 0–0.4)
EOSINOPHIL NFR BLD AUTO: 8.9 % (ref 0.3–6.2)
ERYTHROCYTE [DISTWIDTH] IN BLOOD BY AUTOMATED COUNT: 15 % (ref 12.3–15.4)
FERRITIN SERPL-MCNC: 37.01 NG/ML (ref 30–400)
FLUAV SUBTYP SPEC NAA+PROBE: NOT DETECTED
FLUBV RNA ISLT QL NAA+PROBE: NOT DETECTED
GFR SERPL CREATININE-BSD FRML MDRD: 54 ML/MIN/1.73
GLOBULIN UR ELPH-MCNC: 3 GM/DL
GLUCOSE SERPL-MCNC: 211 MG/DL (ref 65–99)
HADV DNA SPEC NAA+PROBE: NOT DETECTED
HCOV 229E RNA SPEC QL NAA+PROBE: NOT DETECTED
HCOV HKU1 RNA SPEC QL NAA+PROBE: NOT DETECTED
HCOV NL63 RNA SPEC QL NAA+PROBE: NOT DETECTED
HCOV OC43 RNA SPEC QL NAA+PROBE: NOT DETECTED
HCT VFR BLD AUTO: 41.3 % (ref 37.5–51)
HGB BLD-MCNC: 13.9 G/DL (ref 13–17.7)
HMPV RNA NPH QL NAA+NON-PROBE: NOT DETECTED
HPIV1 RNA SPEC QL NAA+PROBE: NOT DETECTED
HPIV2 RNA SPEC QL NAA+PROBE: NOT DETECTED
HPIV3 RNA NPH QL NAA+PROBE: NOT DETECTED
HPIV4 P GENE NPH QL NAA+PROBE: NOT DETECTED
LDH SERPL-CCNC: 204 U/L (ref 135–225)
LYMPHOCYTES # BLD AUTO: 1.8 10*3/MM3 (ref 0.7–3.1)
LYMPHOCYTES NFR BLD AUTO: 17.7 % (ref 19.6–45.3)
M PNEUMO IGG SER IA-ACNC: NOT DETECTED
MCH RBC QN AUTO: 29.4 PG (ref 26.6–33)
MCHC RBC AUTO-ENTMCNC: 33.8 G/DL (ref 31.5–35.7)
MCV RBC AUTO: 87 FL (ref 79–97)
MONOCYTES # BLD AUTO: 0.7 10*3/MM3 (ref 0.1–0.9)
MONOCYTES NFR BLD AUTO: 7.4 % (ref 5–12)
NEUTROPHILS NFR BLD AUTO: 6.4 10*3/MM3 (ref 1.7–7)
NEUTROPHILS NFR BLD AUTO: 64.7 % (ref 42.7–76)
NRBC BLD AUTO-RTO: 0.1 /100 WBC (ref 0–0.2)
NT-PROBNP SERPL-MCNC: 425.3 PG/ML (ref 0–900)
PLATELET # BLD AUTO: 373 10*3/MM3 (ref 140–450)
PMV BLD AUTO: 7.9 FL (ref 6–12)
POTASSIUM SERPL-SCNC: 3.9 MMOL/L (ref 3.5–5.2)
PROCALCITONIN SERPL-MCNC: 0.07 NG/ML (ref 0–0.25)
PROT SERPL-MCNC: 6.7 G/DL (ref 6–8.5)
RBC # BLD AUTO: 4.74 10*6/MM3 (ref 4.14–5.8)
RHINOVIRUS RNA SPEC NAA+PROBE: NOT DETECTED
RSV RNA NPH QL NAA+NON-PROBE: NOT DETECTED
SARS-COV-2 RNA NPH QL NAA+NON-PROBE: NOT DETECTED
SODIUM SERPL-SCNC: 135 MMOL/L (ref 136–145)
TROPONIN T SERPL-MCNC: <0.01 NG/ML (ref 0–0.03)
WBC # BLD AUTO: 9.9 10*3/MM3 (ref 3.4–10.8)

## 2020-12-21 PROCEDURE — G0378 HOSPITAL OBSERVATION PER HR: HCPCS

## 2020-12-21 PROCEDURE — 94640 AIRWAY INHALATION TREATMENT: CPT

## 2020-12-21 PROCEDURE — 99284 EMERGENCY DEPT VISIT MOD MDM: CPT

## 2020-12-21 PROCEDURE — 82728 ASSAY OF FERRITIN: CPT | Performed by: NURSE PRACTITIONER

## 2020-12-21 PROCEDURE — 84484 ASSAY OF TROPONIN QUANT: CPT | Performed by: NURSE PRACTITIONER

## 2020-12-21 PROCEDURE — 85025 COMPLETE CBC W/AUTO DIFF WBC: CPT | Performed by: NURSE PRACTITIONER

## 2020-12-21 PROCEDURE — 94799 UNLISTED PULMONARY SVC/PX: CPT

## 2020-12-21 PROCEDURE — 99285 EMERGENCY DEPT VISIT HI MDM: CPT

## 2020-12-21 PROCEDURE — 80162 ASSAY OF DIGOXIN TOTAL: CPT | Performed by: NURSE PRACTITIONER

## 2020-12-21 PROCEDURE — 71045 X-RAY EXAM CHEST 1 VIEW: CPT

## 2020-12-21 PROCEDURE — 0202U NFCT DS 22 TRGT SARS-COV-2: CPT | Performed by: NURSE PRACTITIONER

## 2020-12-21 PROCEDURE — 83880 ASSAY OF NATRIURETIC PEPTIDE: CPT | Performed by: NURSE PRACTITIONER

## 2020-12-21 PROCEDURE — 85379 FIBRIN DEGRADATION QUANT: CPT | Performed by: NURSE PRACTITIONER

## 2020-12-21 PROCEDURE — 80053 COMPREHEN METABOLIC PANEL: CPT | Performed by: NURSE PRACTITIONER

## 2020-12-21 PROCEDURE — 71275 CT ANGIOGRAPHY CHEST: CPT

## 2020-12-21 PROCEDURE — 83615 LACTATE (LD) (LDH) ENZYME: CPT | Performed by: NURSE PRACTITIONER

## 2020-12-21 PROCEDURE — 0 IOPAMIDOL PER 1 ML: Performed by: NURSE PRACTITIONER

## 2020-12-21 PROCEDURE — 93005 ELECTROCARDIOGRAM TRACING: CPT | Performed by: FAMILY MEDICINE

## 2020-12-21 PROCEDURE — 93005 ELECTROCARDIOGRAM TRACING: CPT

## 2020-12-21 PROCEDURE — 86140 C-REACTIVE PROTEIN: CPT | Performed by: NURSE PRACTITIONER

## 2020-12-21 PROCEDURE — 84145 PROCALCITONIN (PCT): CPT | Performed by: NURSE PRACTITIONER

## 2020-12-21 RX ORDER — TRAZODONE HYDROCHLORIDE 100 MG/1
100 TABLET ORAL NIGHTLY PRN
Status: DISCONTINUED | OUTPATIENT
Start: 2020-12-21 | End: 2020-12-28 | Stop reason: HOSPADM

## 2020-12-21 RX ORDER — DOXYCYCLINE 100 MG/1
100 TABLET ORAL EVERY 12 HOURS SCHEDULED
Status: DISCONTINUED | OUTPATIENT
Start: 2020-12-22 | End: 2020-12-22

## 2020-12-21 RX ORDER — ALBUTEROL SULFATE 2.5 MG/3ML
2.5 SOLUTION RESPIRATORY (INHALATION)
Status: DISCONTINUED | OUTPATIENT
Start: 2020-12-21 | End: 2020-12-22

## 2020-12-21 RX ORDER — DOXYCYCLINE HYCLATE 100 MG/1
100 CAPSULE ORAL 2 TIMES DAILY
COMMUNITY
Start: 2020-12-16 | End: 2020-12-28 | Stop reason: HOSPADM

## 2020-12-21 RX ORDER — SODIUM CHLORIDE 0.9 % (FLUSH) 0.9 %
10 SYRINGE (ML) INJECTION AS NEEDED
Status: DISCONTINUED | OUTPATIENT
Start: 2020-12-21 | End: 2020-12-28 | Stop reason: HOSPADM

## 2020-12-21 RX ORDER — IPRATROPIUM BROMIDE AND ALBUTEROL SULFATE 2.5; .5 MG/3ML; MG/3ML
3 SOLUTION RESPIRATORY (INHALATION)
Status: DISCONTINUED | OUTPATIENT
Start: 2020-12-22 | End: 2020-12-22 | Stop reason: ALTCHOICE

## 2020-12-21 RX ORDER — ATORVASTATIN CALCIUM 40 MG/1
40 TABLET, FILM COATED ORAL NIGHTLY
Status: DISCONTINUED | OUTPATIENT
Start: 2020-12-22 | End: 2020-12-28 | Stop reason: HOSPADM

## 2020-12-21 RX ORDER — METOLAZONE 5 MG/1
5 TABLET ORAL NIGHTLY
Status: ON HOLD | COMMUNITY
End: 2021-05-17

## 2020-12-21 RX ORDER — MIRTAZAPINE 15 MG/1
30 TABLET, FILM COATED ORAL NIGHTLY
Status: DISCONTINUED | OUTPATIENT
Start: 2020-12-22 | End: 2020-12-28 | Stop reason: HOSPADM

## 2020-12-21 RX ORDER — ACETAMINOPHEN 325 MG/1
325 TABLET ORAL EVERY 6 HOURS PRN
Status: DISCONTINUED | OUTPATIENT
Start: 2020-12-21 | End: 2020-12-25

## 2020-12-21 RX ORDER — OXYCODONE HYDROCHLORIDE 5 MG/1
10 TABLET ORAL EVERY 4 HOURS PRN
Status: DISCONTINUED | OUTPATIENT
Start: 2020-12-21 | End: 2020-12-28 | Stop reason: HOSPADM

## 2020-12-21 RX ORDER — IPRATROPIUM BROMIDE AND ALBUTEROL SULFATE 2.5; .5 MG/3ML; MG/3ML
3 SOLUTION RESPIRATORY (INHALATION) ONCE
Status: COMPLETED | OUTPATIENT
Start: 2020-12-21 | End: 2020-12-21

## 2020-12-21 RX ADMIN — OXYCODONE 10 MG: 5 TABLET ORAL at 21:43

## 2020-12-21 RX ADMIN — IOPAMIDOL 100 ML: 755 INJECTION, SOLUTION INTRAVENOUS at 19:09

## 2020-12-21 RX ADMIN — IPRATROPIUM BROMIDE AND ALBUTEROL SULFATE 3 ML: .5; 3 SOLUTION RESPIRATORY (INHALATION) at 19:23

## 2020-12-22 PROBLEM — L97.419 DIABETIC ULCER OF RIGHT MIDFOOT ASSOCIATED WITH TYPE 2 DIABETES MELLITUS: Status: ACTIVE | Noted: 2020-10-29

## 2020-12-22 LAB
GLUCOSE BLDC GLUCOMTR-MCNC: 135 MG/DL (ref 70–105)
GLUCOSE BLDC GLUCOMTR-MCNC: 198 MG/DL (ref 70–105)
GLUCOSE BLDC GLUCOMTR-MCNC: 203 MG/DL (ref 70–105)
GLUCOSE BLDC GLUCOMTR-MCNC: 247 MG/DL (ref 70–105)
QT INTERVAL: 413 MS

## 2020-12-22 PROCEDURE — 94640 AIRWAY INHALATION TREATMENT: CPT

## 2020-12-22 PROCEDURE — 94799 UNLISTED PULMONARY SVC/PX: CPT

## 2020-12-22 PROCEDURE — 25010000002 CEFTRIAXONE PER 250 MG: Performed by: FAMILY MEDICINE

## 2020-12-22 PROCEDURE — 99231 SBSQ HOSP IP/OBS SF/LOW 25: CPT | Performed by: NURSE PRACTITIONER

## 2020-12-22 PROCEDURE — 82962 GLUCOSE BLOOD TEST: CPT

## 2020-12-22 PROCEDURE — 25010000002 METHYLPREDNISOLONE PER 40 MG: Performed by: FAMILY MEDICINE

## 2020-12-22 PROCEDURE — 63710000001 INSULIN LISPRO (HUMAN) PER 5 UNITS: Performed by: FAMILY MEDICINE

## 2020-12-22 RX ORDER — IPRATROPIUM BROMIDE AND ALBUTEROL SULFATE 2.5; .5 MG/3ML; MG/3ML
3 SOLUTION RESPIRATORY (INHALATION)
Status: DISCONTINUED | OUTPATIENT
Start: 2020-12-23 | End: 2020-12-22

## 2020-12-22 RX ORDER — IPRATROPIUM BROMIDE AND ALBUTEROL SULFATE 2.5; .5 MG/3ML; MG/3ML
3 SOLUTION RESPIRATORY (INHALATION)
Status: DISCONTINUED | OUTPATIENT
Start: 2020-12-23 | End: 2020-12-28

## 2020-12-22 RX ORDER — SODIUM CHLORIDE 9 MG/ML
20 INJECTION, SOLUTION INTRAVENOUS CONTINUOUS
Status: DISCONTINUED | OUTPATIENT
Start: 2020-12-22 | End: 2020-12-26

## 2020-12-22 RX ORDER — INSULIN LISPRO 100 [IU]/ML
0-9 INJECTION, SOLUTION INTRAVENOUS; SUBCUTANEOUS AS NEEDED
Status: DISCONTINUED | OUTPATIENT
Start: 2020-12-22 | End: 2020-12-24 | Stop reason: DRUGHIGH

## 2020-12-22 RX ORDER — DEXTROSE MONOHYDRATE 25 G/50ML
25 INJECTION, SOLUTION INTRAVENOUS
Status: DISCONTINUED | OUTPATIENT
Start: 2020-12-22 | End: 2020-12-28 | Stop reason: HOSPADM

## 2020-12-22 RX ORDER — INSULIN LISPRO 100 [IU]/ML
0-9 INJECTION, SOLUTION INTRAVENOUS; SUBCUTANEOUS
Status: DISCONTINUED | OUTPATIENT
Start: 2020-12-22 | End: 2020-12-24 | Stop reason: DRUGHIGH

## 2020-12-22 RX ORDER — NICOTINE POLACRILEX 4 MG
15 LOZENGE BUCCAL
Status: DISCONTINUED | OUTPATIENT
Start: 2020-12-22 | End: 2020-12-28 | Stop reason: HOSPADM

## 2020-12-22 RX ORDER — PREGABALIN 75 MG/1
150 CAPSULE ORAL 2 TIMES DAILY
Status: DISCONTINUED | OUTPATIENT
Start: 2020-12-22 | End: 2020-12-28 | Stop reason: HOSPADM

## 2020-12-22 RX ORDER — PRASUGREL 10 MG/1
10 TABLET, FILM COATED ORAL DAILY
Status: DISCONTINUED | OUTPATIENT
Start: 2020-12-22 | End: 2020-12-28 | Stop reason: HOSPADM

## 2020-12-22 RX ORDER — LOSARTAN POTASSIUM 50 MG/1
50 TABLET ORAL DAILY
Status: DISCONTINUED | OUTPATIENT
Start: 2020-12-22 | End: 2020-12-28 | Stop reason: HOSPADM

## 2020-12-22 RX ORDER — IPRATROPIUM BROMIDE AND ALBUTEROL SULFATE 2.5; .5 MG/3ML; MG/3ML
3 SOLUTION RESPIRATORY (INHALATION)
Status: DISCONTINUED | OUTPATIENT
Start: 2020-12-22 | End: 2020-12-22

## 2020-12-22 RX ORDER — OXYCODONE HYDROCHLORIDE 5 MG/1
10 TABLET ORAL EVERY 4 HOURS PRN
Status: DISCONTINUED | OUTPATIENT
Start: 2020-12-22 | End: 2020-12-22 | Stop reason: SDUPTHER

## 2020-12-22 RX ORDER — IPRATROPIUM BROMIDE AND ALBUTEROL SULFATE 2.5; .5 MG/3ML; MG/3ML
3 SOLUTION RESPIRATORY (INHALATION) EVERY 4 HOURS PRN
Status: DISCONTINUED | OUTPATIENT
Start: 2020-12-22 | End: 2020-12-28 | Stop reason: HOSPADM

## 2020-12-22 RX ORDER — BUDESONIDE 0.5 MG/2ML
0.5 INHALANT ORAL
Status: DISCONTINUED | OUTPATIENT
Start: 2020-12-22 | End: 2020-12-26

## 2020-12-22 RX ORDER — POTASSIUM CHLORIDE 750 MG/1
10 TABLET, FILM COATED, EXTENDED RELEASE ORAL 2 TIMES DAILY
Status: DISCONTINUED | OUTPATIENT
Start: 2020-12-22 | End: 2020-12-28 | Stop reason: HOSPADM

## 2020-12-22 RX ORDER — ATORVASTATIN CALCIUM 40 MG/1
40 TABLET, FILM COATED ORAL NIGHTLY
Status: DISCONTINUED | OUTPATIENT
Start: 2020-12-22 | End: 2020-12-22 | Stop reason: SDUPTHER

## 2020-12-22 RX ORDER — FUROSEMIDE 20 MG/1
20 TABLET ORAL DAILY
Status: DISCONTINUED | OUTPATIENT
Start: 2020-12-22 | End: 2020-12-28 | Stop reason: HOSPADM

## 2020-12-22 RX ORDER — MIRTAZAPINE 15 MG/1
30 TABLET, FILM COATED ORAL NIGHTLY
Status: DISCONTINUED | OUTPATIENT
Start: 2020-12-22 | End: 2020-12-22 | Stop reason: SDUPTHER

## 2020-12-22 RX ORDER — METHYLPREDNISOLONE SODIUM SUCCINATE 40 MG/ML
20 INJECTION, POWDER, LYOPHILIZED, FOR SOLUTION INTRAMUSCULAR; INTRAVENOUS EVERY 12 HOURS
Status: DISCONTINUED | OUTPATIENT
Start: 2020-12-22 | End: 2020-12-23

## 2020-12-22 RX ADMIN — PREGABALIN 150 MG: 75 CAPSULE ORAL at 09:25

## 2020-12-22 RX ADMIN — Medication 10 ML: at 21:23

## 2020-12-22 RX ADMIN — LOSARTAN POTASSIUM 50 MG: 50 TABLET, FILM COATED ORAL at 09:25

## 2020-12-22 RX ADMIN — POTASSIUM CHLORIDE 10 MEQ: 750 TABLET, EXTENDED RELEASE ORAL at 21:23

## 2020-12-22 RX ADMIN — POTASSIUM CHLORIDE 10 MEQ: 750 TABLET, EXTENDED RELEASE ORAL at 09:25

## 2020-12-22 RX ADMIN — CEFTRIAXONE SODIUM 1 G: 1 INJECTION, POWDER, FOR SOLUTION INTRAMUSCULAR; INTRAVENOUS at 09:24

## 2020-12-22 RX ADMIN — IPRATROPIUM BROMIDE AND ALBUTEROL SULFATE 3 ML: 2.5; .5 SOLUTION RESPIRATORY (INHALATION) at 18:34

## 2020-12-22 RX ADMIN — OXYCODONE 10 MG: 5 TABLET ORAL at 19:49

## 2020-12-22 RX ADMIN — METHYLPREDNISOLONE SODIUM SUCCINATE 20 MG: 40 INJECTION, POWDER, FOR SOLUTION INTRAMUSCULAR; INTRAVENOUS at 09:24

## 2020-12-22 RX ADMIN — IPRATROPIUM BROMIDE AND ALBUTEROL SULFATE 3 ML: 2.5; .5 SOLUTION RESPIRATORY (INHALATION) at 11:07

## 2020-12-22 RX ADMIN — SODIUM CHLORIDE 20 ML/HR: 9 INJECTION, SOLUTION INTRAVENOUS at 10:01

## 2020-12-22 RX ADMIN — METOPROLOL TARTRATE 25 MG: 25 TABLET, FILM COATED ORAL at 21:23

## 2020-12-22 RX ADMIN — FUROSEMIDE 20 MG: 20 TABLET ORAL at 09:26

## 2020-12-22 RX ADMIN — PRASUGREL 10 MG: 10 TABLET, FILM COATED ORAL at 11:58

## 2020-12-22 RX ADMIN — METHYLPREDNISOLONE SODIUM SUCCINATE 20 MG: 40 INJECTION, POWDER, FOR SOLUTION INTRAMUSCULAR; INTRAVENOUS at 21:23

## 2020-12-22 RX ADMIN — MIRTAZAPINE 30 MG: 15 TABLET, FILM COATED ORAL at 21:26

## 2020-12-22 RX ADMIN — ATORVASTATIN CALCIUM 40 MG: 40 TABLET, FILM COATED ORAL at 21:26

## 2020-12-22 RX ADMIN — INSULIN LISPRO 4 UNITS: 100 INJECTION, SOLUTION INTRAVENOUS; SUBCUTANEOUS at 09:24

## 2020-12-22 RX ADMIN — IPRATROPIUM BROMIDE AND ALBUTEROL SULFATE 3 ML: 2.5; .5 SOLUTION RESPIRATORY (INHALATION) at 15:25

## 2020-12-22 RX ADMIN — IPRATROPIUM BROMIDE AND ALBUTEROL SULFATE 3 ML: 2.5; .5 SOLUTION RESPIRATORY (INHALATION) at 23:33

## 2020-12-22 RX ADMIN — BUDESONIDE 0.5 MG: 0.5 INHALANT RESPIRATORY (INHALATION) at 18:34

## 2020-12-22 RX ADMIN — IPRATROPIUM BROMIDE AND ALBUTEROL SULFATE 3 ML: 2.5; .5 SOLUTION RESPIRATORY (INHALATION) at 06:13

## 2020-12-22 RX ADMIN — METOPROLOL TARTRATE 25 MG: 25 TABLET, FILM COATED ORAL at 09:25

## 2020-12-22 RX ADMIN — MIRTAZAPINE 30 MG: 15 TABLET, FILM COATED ORAL at 00:11

## 2020-12-22 RX ADMIN — DOXYCYCLINE 100 MG: 100 TABLET, FILM COATED ORAL at 00:11

## 2020-12-22 RX ADMIN — ALBUTEROL SULFATE 2.5 MG: 2.5 SOLUTION RESPIRATORY (INHALATION) at 00:24

## 2020-12-22 RX ADMIN — INSULIN LISPRO 2 UNITS: 100 INJECTION, SOLUTION INTRAVENOUS; SUBCUTANEOUS at 17:24

## 2020-12-22 RX ADMIN — PREGABALIN 150 MG: 75 CAPSULE ORAL at 21:22

## 2020-12-22 RX ADMIN — OXYCODONE 10 MG: 5 TABLET ORAL at 11:57

## 2020-12-22 RX ADMIN — ATORVASTATIN CALCIUM 40 MG: 40 TABLET, FILM COATED ORAL at 00:11

## 2020-12-22 RX ADMIN — OXYCODONE 10 MG: 5 TABLET ORAL at 01:52

## 2020-12-23 LAB
GLUCOSE BLDC GLUCOMTR-MCNC: 195 MG/DL (ref 70–105)
GLUCOSE BLDC GLUCOMTR-MCNC: 198 MG/DL (ref 70–105)
GLUCOSE BLDC GLUCOMTR-MCNC: 203 MG/DL (ref 70–105)
GLUCOSE BLDC GLUCOMTR-MCNC: 226 MG/DL (ref 70–105)

## 2020-12-23 PROCEDURE — 94799 UNLISTED PULMONARY SVC/PX: CPT

## 2020-12-23 PROCEDURE — 82962 GLUCOSE BLOOD TEST: CPT

## 2020-12-23 PROCEDURE — 25010000002 METHYLPREDNISOLONE PER 40 MG: Performed by: FAMILY MEDICINE

## 2020-12-23 PROCEDURE — 25010000002 CEFTRIAXONE PER 250 MG: Performed by: FAMILY MEDICINE

## 2020-12-23 PROCEDURE — 63710000001 INSULIN LISPRO (HUMAN) PER 5 UNITS: Performed by: FAMILY MEDICINE

## 2020-12-23 RX ORDER — METHYLPREDNISOLONE SODIUM SUCCINATE 40 MG/ML
20 INJECTION, POWDER, LYOPHILIZED, FOR SOLUTION INTRAMUSCULAR; INTRAVENOUS DAILY
Status: DISCONTINUED | OUTPATIENT
Start: 2020-12-23 | End: 2020-12-28

## 2020-12-23 RX ADMIN — Medication 10 ML: at 20:24

## 2020-12-23 RX ADMIN — ATORVASTATIN CALCIUM 40 MG: 40 TABLET, FILM COATED ORAL at 20:23

## 2020-12-23 RX ADMIN — LOSARTAN POTASSIUM 50 MG: 50 TABLET, FILM COATED ORAL at 08:57

## 2020-12-23 RX ADMIN — BUDESONIDE 0.5 MG: 0.5 INHALANT RESPIRATORY (INHALATION) at 11:46

## 2020-12-23 RX ADMIN — BUDESONIDE 0.5 MG: 0.5 INHALANT RESPIRATORY (INHALATION) at 08:28

## 2020-12-23 RX ADMIN — IPRATROPIUM BROMIDE AND ALBUTEROL SULFATE 3 ML: 2.5; .5 SOLUTION RESPIRATORY (INHALATION) at 11:46

## 2020-12-23 RX ADMIN — PRASUGREL 10 MG: 10 TABLET, FILM COATED ORAL at 08:57

## 2020-12-23 RX ADMIN — IPRATROPIUM BROMIDE AND ALBUTEROL SULFATE 3 ML: 2.5; .5 SOLUTION RESPIRATORY (INHALATION) at 18:40

## 2020-12-23 RX ADMIN — PREGABALIN 150 MG: 75 CAPSULE ORAL at 08:57

## 2020-12-23 RX ADMIN — INSULIN LISPRO 2 UNITS: 100 INJECTION, SOLUTION INTRAVENOUS; SUBCUTANEOUS at 08:57

## 2020-12-23 RX ADMIN — INSULIN LISPRO 4 UNITS: 100 INJECTION, SOLUTION INTRAVENOUS; SUBCUTANEOUS at 16:59

## 2020-12-23 RX ADMIN — IPRATROPIUM BROMIDE AND ALBUTEROL SULFATE 3 ML: 2.5; .5 SOLUTION RESPIRATORY (INHALATION) at 15:27

## 2020-12-23 RX ADMIN — Medication 10 ML: at 08:58

## 2020-12-23 RX ADMIN — POTASSIUM CHLORIDE 10 MEQ: 750 TABLET, EXTENDED RELEASE ORAL at 08:57

## 2020-12-23 RX ADMIN — CEFTRIAXONE SODIUM 1 G: 1 INJECTION, POWDER, FOR SOLUTION INTRAMUSCULAR; INTRAVENOUS at 08:58

## 2020-12-23 RX ADMIN — PREGABALIN 150 MG: 75 CAPSULE ORAL at 20:23

## 2020-12-23 RX ADMIN — INSULIN LISPRO 4 UNITS: 100 INJECTION, SOLUTION INTRAVENOUS; SUBCUTANEOUS at 12:21

## 2020-12-23 RX ADMIN — BUDESONIDE 0.5 MG: 0.5 INHALANT RESPIRATORY (INHALATION) at 18:40

## 2020-12-23 RX ADMIN — METHYLPREDNISOLONE SODIUM SUCCINATE 20 MG: 40 INJECTION, POWDER, FOR SOLUTION INTRAMUSCULAR; INTRAVENOUS at 08:57

## 2020-12-23 RX ADMIN — OXYCODONE 10 MG: 5 TABLET ORAL at 18:16

## 2020-12-23 RX ADMIN — FUROSEMIDE 20 MG: 20 TABLET ORAL at 08:57

## 2020-12-23 RX ADMIN — IPRATROPIUM BROMIDE AND ALBUTEROL SULFATE 3 ML: 2.5; .5 SOLUTION RESPIRATORY (INHALATION) at 03:26

## 2020-12-23 RX ADMIN — METOPROLOL TARTRATE 25 MG: 25 TABLET, FILM COATED ORAL at 08:57

## 2020-12-23 RX ADMIN — IPRATROPIUM BROMIDE AND ALBUTEROL SULFATE 3 ML: 2.5; .5 SOLUTION RESPIRATORY (INHALATION) at 08:28

## 2020-12-23 RX ADMIN — POTASSIUM CHLORIDE 10 MEQ: 750 TABLET, EXTENDED RELEASE ORAL at 20:23

## 2020-12-23 RX ADMIN — METOPROLOL TARTRATE 25 MG: 25 TABLET, FILM COATED ORAL at 20:23

## 2020-12-23 RX ADMIN — MIRTAZAPINE 30 MG: 15 TABLET, FILM COATED ORAL at 20:23

## 2020-12-23 RX ADMIN — IPRATROPIUM BROMIDE AND ALBUTEROL SULFATE 3 ML: 2.5; .5 SOLUTION RESPIRATORY (INHALATION) at 23:32

## 2020-12-24 LAB
GLUCOSE BLDC GLUCOMTR-MCNC: 166 MG/DL (ref 70–105)
GLUCOSE BLDC GLUCOMTR-MCNC: 193 MG/DL (ref 70–105)
GLUCOSE BLDC GLUCOMTR-MCNC: 230 MG/DL (ref 70–105)
GLUCOSE BLDC GLUCOMTR-MCNC: 256 MG/DL (ref 70–105)

## 2020-12-24 PROCEDURE — 94799 UNLISTED PULMONARY SVC/PX: CPT

## 2020-12-24 PROCEDURE — 63710000001 INSULIN LISPRO (HUMAN) PER 5 UNITS: Performed by: FAMILY MEDICINE

## 2020-12-24 PROCEDURE — 25010000002 CEFTRIAXONE PER 250 MG: Performed by: FAMILY MEDICINE

## 2020-12-24 PROCEDURE — 25010000002 METHYLPREDNISOLONE PER 40 MG: Performed by: FAMILY MEDICINE

## 2020-12-24 PROCEDURE — 82962 GLUCOSE BLOOD TEST: CPT

## 2020-12-24 RX ORDER — INSULIN LISPRO 100 [IU]/ML
0-14 INJECTION, SOLUTION INTRAVENOUS; SUBCUTANEOUS AS NEEDED
Status: DISCONTINUED | OUTPATIENT
Start: 2020-12-24 | End: 2020-12-26

## 2020-12-24 RX ORDER — INSULIN LISPRO 100 [IU]/ML
0-14 INJECTION, SOLUTION INTRAVENOUS; SUBCUTANEOUS
Status: DISCONTINUED | OUTPATIENT
Start: 2020-12-24 | End: 2020-12-26

## 2020-12-24 RX ADMIN — INSULIN LISPRO 2 UNITS: 100 INJECTION, SOLUTION INTRAVENOUS; SUBCUTANEOUS at 08:56

## 2020-12-24 RX ADMIN — SODIUM CHLORIDE 20 ML/HR: 9 INJECTION, SOLUTION INTRAVENOUS at 12:23

## 2020-12-24 RX ADMIN — BUDESONIDE 0.5 MG: 0.5 INHALANT RESPIRATORY (INHALATION) at 07:30

## 2020-12-24 RX ADMIN — PREGABALIN 150 MG: 75 CAPSULE ORAL at 22:14

## 2020-12-24 RX ADMIN — OXYCODONE 10 MG: 5 TABLET ORAL at 13:41

## 2020-12-24 RX ADMIN — Medication 10 ML: at 08:51

## 2020-12-24 RX ADMIN — INSULIN LISPRO 6 UNITS: 100 INJECTION, SOLUTION INTRAVENOUS; SUBCUTANEOUS at 12:23

## 2020-12-24 RX ADMIN — LOSARTAN POTASSIUM 50 MG: 50 TABLET, FILM COATED ORAL at 09:02

## 2020-12-24 RX ADMIN — PRASUGREL 10 MG: 10 TABLET, FILM COATED ORAL at 09:09

## 2020-12-24 RX ADMIN — METOPROLOL TARTRATE 25 MG: 25 TABLET, FILM COATED ORAL at 09:02

## 2020-12-24 RX ADMIN — IPRATROPIUM BROMIDE AND ALBUTEROL SULFATE 3 ML: 2.5; .5 SOLUTION RESPIRATORY (INHALATION) at 18:37

## 2020-12-24 RX ADMIN — OXYCODONE 10 MG: 5 TABLET ORAL at 18:13

## 2020-12-24 RX ADMIN — METHYLPREDNISOLONE SODIUM SUCCINATE 20 MG: 40 INJECTION, POWDER, FOR SOLUTION INTRAMUSCULAR; INTRAVENOUS at 09:01

## 2020-12-24 RX ADMIN — ATORVASTATIN CALCIUM 40 MG: 40 TABLET, FILM COATED ORAL at 22:15

## 2020-12-24 RX ADMIN — POTASSIUM CHLORIDE 10 MEQ: 750 TABLET, EXTENDED RELEASE ORAL at 09:02

## 2020-12-24 RX ADMIN — OXYCODONE 10 MG: 5 TABLET ORAL at 09:01

## 2020-12-24 RX ADMIN — PREGABALIN 150 MG: 75 CAPSULE ORAL at 09:02

## 2020-12-24 RX ADMIN — MIRTAZAPINE 30 MG: 15 TABLET, FILM COATED ORAL at 22:15

## 2020-12-24 RX ADMIN — INSULIN LISPRO 3 UNITS: 100 INJECTION, SOLUTION INTRAVENOUS; SUBCUTANEOUS at 17:24

## 2020-12-24 RX ADMIN — BUDESONIDE 0.5 MG: 0.5 INHALANT RESPIRATORY (INHALATION) at 18:40

## 2020-12-24 RX ADMIN — CEFTRIAXONE SODIUM 1 G: 1 INJECTION, POWDER, FOR SOLUTION INTRAMUSCULAR; INTRAVENOUS at 08:51

## 2020-12-24 RX ADMIN — FUROSEMIDE 20 MG: 20 TABLET ORAL at 09:02

## 2020-12-24 RX ADMIN — IPRATROPIUM BROMIDE AND ALBUTEROL SULFATE 3 ML: 2.5; .5 SOLUTION RESPIRATORY (INHALATION) at 11:30

## 2020-12-24 RX ADMIN — METOPROLOL TARTRATE 25 MG: 25 TABLET, FILM COATED ORAL at 22:15

## 2020-12-24 RX ADMIN — IPRATROPIUM BROMIDE AND ALBUTEROL SULFATE 3 ML: 2.5; .5 SOLUTION RESPIRATORY (INHALATION) at 07:30

## 2020-12-24 RX ADMIN — POTASSIUM CHLORIDE 10 MEQ: 750 TABLET, EXTENDED RELEASE ORAL at 22:14

## 2020-12-24 RX ADMIN — IPRATROPIUM BROMIDE AND ALBUTEROL SULFATE 3 ML: 2.5; .5 SOLUTION RESPIRATORY (INHALATION) at 03:27

## 2020-12-24 RX ADMIN — BUDESONIDE 0.5 MG: 0.5 INHALANT RESPIRATORY (INHALATION) at 11:30

## 2020-12-24 RX ADMIN — IPRATROPIUM BROMIDE AND ALBUTEROL SULFATE 3 ML: 2.5; .5 SOLUTION RESPIRATORY (INHALATION) at 15:26

## 2020-12-25 LAB
GLUCOSE BLDC GLUCOMTR-MCNC: 155 MG/DL (ref 70–105)
GLUCOSE BLDC GLUCOMTR-MCNC: 176 MG/DL (ref 70–105)
GLUCOSE BLDC GLUCOMTR-MCNC: 249 MG/DL (ref 70–105)
GLUCOSE BLDC GLUCOMTR-MCNC: 261 MG/DL (ref 70–105)

## 2020-12-25 PROCEDURE — 82962 GLUCOSE BLOOD TEST: CPT

## 2020-12-25 PROCEDURE — 63710000001 INSULIN LISPRO (HUMAN) PER 5 UNITS: Performed by: FAMILY MEDICINE

## 2020-12-25 PROCEDURE — 94799 UNLISTED PULMONARY SVC/PX: CPT

## 2020-12-25 PROCEDURE — 25010000002 METHYLPREDNISOLONE PER 40 MG: Performed by: FAMILY MEDICINE

## 2020-12-25 PROCEDURE — 25010000002 CEFTRIAXONE PER 250 MG: Performed by: FAMILY MEDICINE

## 2020-12-25 RX ORDER — TRAMADOL HYDROCHLORIDE 50 MG/1
50 TABLET ORAL EVERY 8 HOURS PRN
Status: DISCONTINUED | OUTPATIENT
Start: 2020-12-25 | End: 2020-12-28 | Stop reason: HOSPADM

## 2020-12-25 RX ORDER — ACETAMINOPHEN 325 MG/1
650 TABLET ORAL EVERY 6 HOURS PRN
Status: DISCONTINUED | OUTPATIENT
Start: 2020-12-25 | End: 2020-12-28 | Stop reason: HOSPADM

## 2020-12-25 RX ORDER — TRAMADOL HYDROCHLORIDE 50 MG/1
50 TABLET ORAL EVERY 6 HOURS PRN
Status: DISCONTINUED | OUTPATIENT
Start: 2020-12-25 | End: 2020-12-25

## 2020-12-25 RX ORDER — GUAIFENESIN AND DEXTROMETHORPHAN HYDROBROMIDE 600; 30 MG/1; MG/1
1 TABLET, EXTENDED RELEASE ORAL EVERY 12 HOURS SCHEDULED
Status: DISCONTINUED | OUTPATIENT
Start: 2020-12-25 | End: 2020-12-28 | Stop reason: HOSPADM

## 2020-12-25 RX ADMIN — OXYCODONE 10 MG: 5 TABLET ORAL at 03:14

## 2020-12-25 RX ADMIN — PRASUGREL 10 MG: 10 TABLET, FILM COATED ORAL at 08:08

## 2020-12-25 RX ADMIN — INSULIN LISPRO 3 UNITS: 100 INJECTION, SOLUTION INTRAVENOUS; SUBCUTANEOUS at 08:07

## 2020-12-25 RX ADMIN — POTASSIUM CHLORIDE 10 MEQ: 750 TABLET, EXTENDED RELEASE ORAL at 08:08

## 2020-12-25 RX ADMIN — GUAIFENESIN, DEXTROMETHORPHAN HBR 1 TABLET: 600; 30 TABLET ORAL at 08:08

## 2020-12-25 RX ADMIN — BUDESONIDE 0.5 MG: 0.5 INHALANT RESPIRATORY (INHALATION) at 11:20

## 2020-12-25 RX ADMIN — GUAIFENESIN, DEXTROMETHORPHAN HBR 1 TABLET: 600; 30 TABLET ORAL at 20:46

## 2020-12-25 RX ADMIN — SODIUM CHLORIDE 20 ML/HR: 9 INJECTION, SOLUTION INTRAVENOUS at 07:58

## 2020-12-25 RX ADMIN — IPRATROPIUM BROMIDE AND ALBUTEROL SULFATE 3 ML: 2.5; .5 SOLUTION RESPIRATORY (INHALATION) at 06:54

## 2020-12-25 RX ADMIN — ATORVASTATIN CALCIUM 40 MG: 40 TABLET, FILM COATED ORAL at 20:46

## 2020-12-25 RX ADMIN — INSULIN LISPRO 8 UNITS: 100 INJECTION, SOLUTION INTRAVENOUS; SUBCUTANEOUS at 17:02

## 2020-12-25 RX ADMIN — METHYLPREDNISOLONE SODIUM SUCCINATE 20 MG: 40 INJECTION, POWDER, FOR SOLUTION INTRAMUSCULAR; INTRAVENOUS at 08:09

## 2020-12-25 RX ADMIN — INSULIN LISPRO 3 UNITS: 100 INJECTION, SOLUTION INTRAVENOUS; SUBCUTANEOUS at 12:10

## 2020-12-25 RX ADMIN — OXYCODONE 10 MG: 5 TABLET ORAL at 18:08

## 2020-12-25 RX ADMIN — OXYCODONE 10 MG: 5 TABLET ORAL at 07:56

## 2020-12-25 RX ADMIN — IPRATROPIUM BROMIDE AND ALBUTEROL SULFATE 3 ML: 2.5; .5 SOLUTION RESPIRATORY (INHALATION) at 15:50

## 2020-12-25 RX ADMIN — Medication 10 ML: at 20:47

## 2020-12-25 RX ADMIN — IPRATROPIUM BROMIDE AND ALBUTEROL SULFATE 3 ML: 2.5; .5 SOLUTION RESPIRATORY (INHALATION) at 05:00

## 2020-12-25 RX ADMIN — ACETAMINOPHEN 650 MG: 325 TABLET, FILM COATED ORAL at 16:00

## 2020-12-25 RX ADMIN — PREGABALIN 150 MG: 75 CAPSULE ORAL at 20:46

## 2020-12-25 RX ADMIN — PREGABALIN 150 MG: 75 CAPSULE ORAL at 08:08

## 2020-12-25 RX ADMIN — CEFTRIAXONE SODIUM 1 G: 1 INJECTION, POWDER, FOR SOLUTION INTRAMUSCULAR; INTRAVENOUS at 08:08

## 2020-12-25 RX ADMIN — FUROSEMIDE 20 MG: 20 TABLET ORAL at 08:08

## 2020-12-25 RX ADMIN — MIRTAZAPINE 30 MG: 15 TABLET, FILM COATED ORAL at 20:46

## 2020-12-25 RX ADMIN — METOPROLOL TARTRATE 25 MG: 25 TABLET, FILM COATED ORAL at 20:46

## 2020-12-25 RX ADMIN — BUDESONIDE 0.5 MG: 0.5 INHALANT RESPIRATORY (INHALATION) at 06:54

## 2020-12-25 RX ADMIN — METOPROLOL TARTRATE 25 MG: 25 TABLET, FILM COATED ORAL at 08:08

## 2020-12-25 RX ADMIN — POTASSIUM CHLORIDE 10 MEQ: 750 TABLET, EXTENDED RELEASE ORAL at 20:46

## 2020-12-25 RX ADMIN — TRAMADOL HYDROCHLORIDE 50 MG: 50 TABLET, FILM COATED ORAL at 12:10

## 2020-12-25 RX ADMIN — LOSARTAN POTASSIUM 50 MG: 50 TABLET, FILM COATED ORAL at 08:08

## 2020-12-25 RX ADMIN — BUDESONIDE 0.5 MG: 0.5 INHALANT RESPIRATORY (INHALATION) at 18:32

## 2020-12-25 RX ADMIN — IPRATROPIUM BROMIDE AND ALBUTEROL SULFATE 3 ML: 2.5; .5 SOLUTION RESPIRATORY (INHALATION) at 18:32

## 2020-12-25 RX ADMIN — ACETAMINOPHEN 650 MG: 325 TABLET, FILM COATED ORAL at 08:08

## 2020-12-25 RX ADMIN — IPRATROPIUM BROMIDE AND ALBUTEROL SULFATE 3 ML: 2.5; .5 SOLUTION RESPIRATORY (INHALATION) at 11:20

## 2020-12-25 RX ADMIN — TRAZODONE HYDROCHLORIDE 100 MG: 100 TABLET ORAL at 20:46

## 2020-12-26 LAB
GLUCOSE BLDC GLUCOMTR-MCNC: 136 MG/DL (ref 70–105)
GLUCOSE BLDC GLUCOMTR-MCNC: 234 MG/DL (ref 70–105)
GLUCOSE BLDC GLUCOMTR-MCNC: 246 MG/DL (ref 70–105)
GLUCOSE BLDC GLUCOMTR-MCNC: 325 MG/DL (ref 70–105)

## 2020-12-26 PROCEDURE — 25010000002 METHYLPREDNISOLONE PER 40 MG: Performed by: FAMILY MEDICINE

## 2020-12-26 PROCEDURE — 25010000002 CEFTRIAXONE PER 250 MG: Performed by: FAMILY MEDICINE

## 2020-12-26 PROCEDURE — 63710000001 INSULIN LISPRO (HUMAN) PER 5 UNITS: Performed by: FAMILY MEDICINE

## 2020-12-26 PROCEDURE — 94799 UNLISTED PULMONARY SVC/PX: CPT

## 2020-12-26 PROCEDURE — 82962 GLUCOSE BLOOD TEST: CPT

## 2020-12-26 PROCEDURE — 97161 PT EVAL LOW COMPLEX 20 MIN: CPT

## 2020-12-26 RX ORDER — INSULIN LISPRO 100 [IU]/ML
0-24 INJECTION, SOLUTION INTRAVENOUS; SUBCUTANEOUS
Status: DISCONTINUED | OUTPATIENT
Start: 2020-12-26 | End: 2020-12-28 | Stop reason: HOSPADM

## 2020-12-26 RX ORDER — INSULIN LISPRO 100 [IU]/ML
0-24 INJECTION, SOLUTION INTRAVENOUS; SUBCUTANEOUS AS NEEDED
Status: DISCONTINUED | OUTPATIENT
Start: 2020-12-26 | End: 2020-12-28 | Stop reason: HOSPADM

## 2020-12-26 RX ORDER — BUDESONIDE 0.5 MG/2ML
0.5 INHALANT ORAL
Status: DISCONTINUED | OUTPATIENT
Start: 2020-12-26 | End: 2020-12-28

## 2020-12-26 RX ADMIN — LOSARTAN POTASSIUM 50 MG: 50 TABLET, FILM COATED ORAL at 05:44

## 2020-12-26 RX ADMIN — METOPROLOL TARTRATE 25 MG: 25 TABLET, FILM COATED ORAL at 21:47

## 2020-12-26 RX ADMIN — BUDESONIDE 0.5 MG: 0.5 INHALANT RESPIRATORY (INHALATION) at 07:16

## 2020-12-26 RX ADMIN — IPRATROPIUM BROMIDE AND ALBUTEROL SULFATE 3 ML: 2.5; .5 SOLUTION RESPIRATORY (INHALATION) at 19:26

## 2020-12-26 RX ADMIN — BUDESONIDE 0.5 MG: 0.5 INHALANT RESPIRATORY (INHALATION) at 11:01

## 2020-12-26 RX ADMIN — FUROSEMIDE 20 MG: 20 TABLET ORAL at 09:06

## 2020-12-26 RX ADMIN — IPRATROPIUM BROMIDE AND ALBUTEROL SULFATE 3 ML: 2.5; .5 SOLUTION RESPIRATORY (INHALATION) at 07:16

## 2020-12-26 RX ADMIN — OXYCODONE 10 MG: 5 TABLET ORAL at 09:07

## 2020-12-26 RX ADMIN — TRAMADOL HYDROCHLORIDE 50 MG: 50 TABLET, FILM COATED ORAL at 12:45

## 2020-12-26 RX ADMIN — Medication 10 ML: at 09:07

## 2020-12-26 RX ADMIN — IPRATROPIUM BROMIDE AND ALBUTEROL SULFATE 3 ML: 2.5; .5 SOLUTION RESPIRATORY (INHALATION) at 15:05

## 2020-12-26 RX ADMIN — OXYCODONE 10 MG: 5 TABLET ORAL at 17:19

## 2020-12-26 RX ADMIN — IPRATROPIUM BROMIDE AND ALBUTEROL SULFATE 3 ML: 2.5; .5 SOLUTION RESPIRATORY (INHALATION) at 02:00

## 2020-12-26 RX ADMIN — OXYCODONE 10 MG: 5 TABLET ORAL at 04:54

## 2020-12-26 RX ADMIN — MIRTAZAPINE 30 MG: 15 TABLET, FILM COATED ORAL at 21:47

## 2020-12-26 RX ADMIN — METHYLPREDNISOLONE SODIUM SUCCINATE 20 MG: 40 INJECTION, POWDER, FOR SOLUTION INTRAMUSCULAR; INTRAVENOUS at 09:06

## 2020-12-26 RX ADMIN — BUDESONIDE 0.5 MG: 0.5 INHALANT RESPIRATORY (INHALATION) at 19:32

## 2020-12-26 RX ADMIN — GUAIFENESIN, DEXTROMETHORPHAN HBR 1 TABLET: 600; 30 TABLET ORAL at 21:47

## 2020-12-26 RX ADMIN — INSULIN LISPRO 16 UNITS: 100 INJECTION, SOLUTION INTRAVENOUS; SUBCUTANEOUS at 17:37

## 2020-12-26 RX ADMIN — Medication 10 ML: at 21:47

## 2020-12-26 RX ADMIN — IPRATROPIUM BROMIDE AND ALBUTEROL SULFATE 3 ML: 2.5; .5 SOLUTION RESPIRATORY (INHALATION) at 11:01

## 2020-12-26 RX ADMIN — POTASSIUM CHLORIDE 10 MEQ: 750 TABLET, EXTENDED RELEASE ORAL at 09:07

## 2020-12-26 RX ADMIN — CEFTRIAXONE SODIUM 1 G: 1 INJECTION, POWDER, FOR SOLUTION INTRAMUSCULAR; INTRAVENOUS at 09:06

## 2020-12-26 RX ADMIN — PRASUGREL 10 MG: 10 TABLET, FILM COATED ORAL at 09:06

## 2020-12-26 RX ADMIN — PREGABALIN 150 MG: 75 CAPSULE ORAL at 21:47

## 2020-12-26 RX ADMIN — ATORVASTATIN CALCIUM 40 MG: 40 TABLET, FILM COATED ORAL at 21:47

## 2020-12-26 RX ADMIN — INSULIN LISPRO 5 UNITS: 100 INJECTION, SOLUTION INTRAVENOUS; SUBCUTANEOUS at 12:44

## 2020-12-26 RX ADMIN — POTASSIUM CHLORIDE 10 MEQ: 750 TABLET, EXTENDED RELEASE ORAL at 21:47

## 2020-12-26 RX ADMIN — GUAIFENESIN, DEXTROMETHORPHAN HBR 1 TABLET: 600; 30 TABLET ORAL at 09:06

## 2020-12-26 RX ADMIN — PREGABALIN 150 MG: 75 CAPSULE ORAL at 09:07

## 2020-12-26 RX ADMIN — METOPROLOL TARTRATE 25 MG: 25 TABLET, FILM COATED ORAL at 05:44

## 2020-12-27 LAB
ALBUMIN SERPL-MCNC: 3.8 G/DL (ref 3.5–5.2)
ALBUMIN/GLOB SERPL: 1.5 G/DL
ALP SERPL-CCNC: 191 U/L (ref 39–117)
ALT SERPL W P-5'-P-CCNC: 14 U/L (ref 1–41)
ANION GAP SERPL CALCULATED.3IONS-SCNC: 9 MMOL/L (ref 5–15)
AST SERPL-CCNC: 16 U/L (ref 1–40)
BASOPHILS # BLD AUTO: 0.1 10*3/MM3 (ref 0–0.2)
BASOPHILS NFR BLD AUTO: 0.8 % (ref 0–1.5)
BILIRUB SERPL-MCNC: 0.4 MG/DL (ref 0–1.2)
BUN SERPL-MCNC: 28 MG/DL (ref 8–23)
BUN/CREAT SERPL: 23.3 (ref 7–25)
CALCIUM SPEC-SCNC: 9.1 MG/DL (ref 8.6–10.5)
CHLORIDE SERPL-SCNC: 100 MMOL/L (ref 98–107)
CO2 SERPL-SCNC: 26 MMOL/L (ref 22–29)
CREAT SERPL-MCNC: 1.2 MG/DL (ref 0.76–1.27)
DEPRECATED RDW RBC AUTO: 49 FL (ref 37–54)
EOSINOPHIL # BLD AUTO: 0.3 10*3/MM3 (ref 0–0.4)
EOSINOPHIL NFR BLD AUTO: 2.5 % (ref 0.3–6.2)
ERYTHROCYTE [DISTWIDTH] IN BLOOD BY AUTOMATED COUNT: 15.7 % (ref 12.3–15.4)
GFR SERPL CREATININE-BSD FRML MDRD: 59 ML/MIN/1.73
GLOBULIN UR ELPH-MCNC: 2.6 GM/DL
GLUCOSE BLDC GLUCOMTR-MCNC: 152 MG/DL (ref 70–105)
GLUCOSE BLDC GLUCOMTR-MCNC: 176 MG/DL (ref 70–105)
GLUCOSE BLDC GLUCOMTR-MCNC: 265 MG/DL (ref 70–105)
GLUCOSE BLDC GLUCOMTR-MCNC: 314 MG/DL (ref 70–105)
GLUCOSE SERPL-MCNC: 197 MG/DL (ref 65–99)
HCT VFR BLD AUTO: 41.3 % (ref 37.5–51)
HGB BLD-MCNC: 13.9 G/DL (ref 13–17.7)
LYMPHOCYTES # BLD AUTO: 2.1 10*3/MM3 (ref 0.7–3.1)
LYMPHOCYTES NFR BLD AUTO: 19.2 % (ref 19.6–45.3)
MCH RBC QN AUTO: 29.9 PG (ref 26.6–33)
MCHC RBC AUTO-ENTMCNC: 33.7 G/DL (ref 31.5–35.7)
MCV RBC AUTO: 88.7 FL (ref 79–97)
MONOCYTES # BLD AUTO: 0.8 10*3/MM3 (ref 0.1–0.9)
MONOCYTES NFR BLD AUTO: 7.5 % (ref 5–12)
NEUTROPHILS NFR BLD AUTO: 7.6 10*3/MM3 (ref 1.7–7)
NEUTROPHILS NFR BLD AUTO: 70 % (ref 42.7–76)
NRBC BLD AUTO-RTO: 0.2 /100 WBC (ref 0–0.2)
NT-PROBNP SERPL-MCNC: 247.4 PG/ML (ref 0–900)
PLATELET # BLD AUTO: 351 10*3/MM3 (ref 140–450)
PMV BLD AUTO: 8.4 FL (ref 6–12)
POTASSIUM SERPL-SCNC: 4 MMOL/L (ref 3.5–5.2)
PROT SERPL-MCNC: 6.4 G/DL (ref 6–8.5)
RBC # BLD AUTO: 4.66 10*6/MM3 (ref 4.14–5.8)
SODIUM SERPL-SCNC: 135 MMOL/L (ref 136–145)
WBC # BLD AUTO: 10.9 10*3/MM3 (ref 3.4–10.8)

## 2020-12-27 PROCEDURE — 85025 COMPLETE CBC W/AUTO DIFF WBC: CPT | Performed by: NURSE PRACTITIONER

## 2020-12-27 PROCEDURE — 63710000001 INSULIN LISPRO (HUMAN) PER 5 UNITS: Performed by: FAMILY MEDICINE

## 2020-12-27 PROCEDURE — 83880 ASSAY OF NATRIURETIC PEPTIDE: CPT | Performed by: NURSE PRACTITIONER

## 2020-12-27 PROCEDURE — 94799 UNLISTED PULMONARY SVC/PX: CPT

## 2020-12-27 PROCEDURE — 80053 COMPREHEN METABOLIC PANEL: CPT | Performed by: NURSE PRACTITIONER

## 2020-12-27 PROCEDURE — 25010000002 METHYLPREDNISOLONE PER 40 MG: Performed by: FAMILY MEDICINE

## 2020-12-27 PROCEDURE — 82962 GLUCOSE BLOOD TEST: CPT

## 2020-12-27 PROCEDURE — 83036 HEMOGLOBIN GLYCOSYLATED A1C: CPT | Performed by: NURSE PRACTITIONER

## 2020-12-27 RX ADMIN — GUAIFENESIN, DEXTROMETHORPHAN HBR 1 TABLET: 600; 30 TABLET ORAL at 08:23

## 2020-12-27 RX ADMIN — MIRTAZAPINE 30 MG: 15 TABLET, FILM COATED ORAL at 20:17

## 2020-12-27 RX ADMIN — PREGABALIN 150 MG: 75 CAPSULE ORAL at 08:24

## 2020-12-27 RX ADMIN — POTASSIUM CHLORIDE 10 MEQ: 750 TABLET, EXTENDED RELEASE ORAL at 08:24

## 2020-12-27 RX ADMIN — FUROSEMIDE 20 MG: 20 TABLET ORAL at 08:24

## 2020-12-27 RX ADMIN — OXYCODONE 10 MG: 5 TABLET ORAL at 07:19

## 2020-12-27 RX ADMIN — PREGABALIN 150 MG: 75 CAPSULE ORAL at 20:17

## 2020-12-27 RX ADMIN — BUDESONIDE 0.5 MG: 0.5 INHALANT RESPIRATORY (INHALATION) at 19:44

## 2020-12-27 RX ADMIN — IPRATROPIUM BROMIDE AND ALBUTEROL SULFATE 3 ML: 2.5; .5 SOLUTION RESPIRATORY (INHALATION) at 19:44

## 2020-12-27 RX ADMIN — INSULIN LISPRO 12 UNITS: 100 INJECTION, SOLUTION INTRAVENOUS; SUBCUTANEOUS at 12:24

## 2020-12-27 RX ADMIN — GUAIFENESIN, DEXTROMETHORPHAN HBR 1 TABLET: 600; 30 TABLET ORAL at 20:17

## 2020-12-27 RX ADMIN — PRASUGREL 10 MG: 10 TABLET, FILM COATED ORAL at 08:23

## 2020-12-27 RX ADMIN — ATORVASTATIN CALCIUM 40 MG: 40 TABLET, FILM COATED ORAL at 20:17

## 2020-12-27 RX ADMIN — BUDESONIDE 0.5 MG: 0.5 INHALANT RESPIRATORY (INHALATION) at 08:04

## 2020-12-27 RX ADMIN — Medication 10 ML: at 20:19

## 2020-12-27 RX ADMIN — OXYCODONE 10 MG: 5 TABLET ORAL at 17:02

## 2020-12-27 RX ADMIN — POTASSIUM CHLORIDE 10 MEQ: 750 TABLET, EXTENDED RELEASE ORAL at 20:17

## 2020-12-27 RX ADMIN — Medication 10 ML: at 08:24

## 2020-12-27 RX ADMIN — IPRATROPIUM BROMIDE AND ALBUTEROL SULFATE 3 ML: 2.5; .5 SOLUTION RESPIRATORY (INHALATION) at 15:31

## 2020-12-27 RX ADMIN — METOPROLOL TARTRATE 25 MG: 25 TABLET, FILM COATED ORAL at 08:24

## 2020-12-27 RX ADMIN — IPRATROPIUM BROMIDE AND ALBUTEROL SULFATE 3 ML: 2.5; .5 SOLUTION RESPIRATORY (INHALATION) at 23:08

## 2020-12-27 RX ADMIN — IPRATROPIUM BROMIDE AND ALBUTEROL SULFATE 3 ML: 2.5; .5 SOLUTION RESPIRATORY (INHALATION) at 11:22

## 2020-12-27 RX ADMIN — OXYCODONE 10 MG: 5 TABLET ORAL at 01:26

## 2020-12-27 RX ADMIN — METHYLPREDNISOLONE SODIUM SUCCINATE 20 MG: 40 INJECTION, POWDER, FOR SOLUTION INTRAMUSCULAR; INTRAVENOUS at 08:24

## 2020-12-27 RX ADMIN — METOPROLOL TARTRATE 25 MG: 25 TABLET, FILM COATED ORAL at 20:17

## 2020-12-27 RX ADMIN — INSULIN LISPRO 4 UNITS: 100 INJECTION, SOLUTION INTRAVENOUS; SUBCUTANEOUS at 07:19

## 2020-12-27 RX ADMIN — INSULIN LISPRO 16 UNITS: 100 INJECTION, SOLUTION INTRAVENOUS; SUBCUTANEOUS at 16:57

## 2020-12-27 RX ADMIN — LOSARTAN POTASSIUM 50 MG: 50 TABLET, FILM COATED ORAL at 08:24

## 2020-12-27 RX ADMIN — IPRATROPIUM BROMIDE AND ALBUTEROL SULFATE 3 ML: 2.5; .5 SOLUTION RESPIRATORY (INHALATION) at 08:03

## 2020-12-28 VITALS
WEIGHT: 266.98 LBS | DIASTOLIC BLOOD PRESSURE: 72 MMHG | TEMPERATURE: 97.6 F | BODY MASS INDEX: 35.38 KG/M2 | HEART RATE: 75 BPM | HEIGHT: 73 IN | SYSTOLIC BLOOD PRESSURE: 117 MMHG | OXYGEN SATURATION: 95 % | RESPIRATION RATE: 20 BRPM

## 2020-12-28 LAB
ALBUMIN SERPL-MCNC: 3.7 G/DL (ref 3.5–5.2)
ALBUMIN/GLOB SERPL: 1.6 G/DL
ALP SERPL-CCNC: 154 U/L (ref 39–117)
ALT SERPL W P-5'-P-CCNC: 10 U/L (ref 1–41)
ANION GAP SERPL CALCULATED.3IONS-SCNC: 9 MMOL/L (ref 5–15)
AST SERPL-CCNC: 12 U/L (ref 1–40)
BASOPHILS # BLD AUTO: 0.1 10*3/MM3 (ref 0–0.2)
BASOPHILS NFR BLD AUTO: 0.9 % (ref 0–1.5)
BILIRUB SERPL-MCNC: 0.5 MG/DL (ref 0–1.2)
BUN SERPL-MCNC: 31 MG/DL (ref 8–23)
BUN/CREAT SERPL: 23.5 (ref 7–25)
CALCIUM SPEC-SCNC: 8.9 MG/DL (ref 8.6–10.5)
CHLORIDE SERPL-SCNC: 99 MMOL/L (ref 98–107)
CO2 SERPL-SCNC: 27 MMOL/L (ref 22–29)
CREAT SERPL-MCNC: 1.32 MG/DL (ref 0.76–1.27)
DEPRECATED RDW RBC AUTO: 48.1 FL (ref 37–54)
EOSINOPHIL # BLD AUTO: 0.3 10*3/MM3 (ref 0–0.4)
EOSINOPHIL NFR BLD AUTO: 2.2 % (ref 0.3–6.2)
ERYTHROCYTE [DISTWIDTH] IN BLOOD BY AUTOMATED COUNT: 15.5 % (ref 12.3–15.4)
GFR SERPL CREATININE-BSD FRML MDRD: 53 ML/MIN/1.73
GLOBULIN UR ELPH-MCNC: 2.3 GM/DL
GLUCOSE BLDC GLUCOMTR-MCNC: 164 MG/DL (ref 70–105)
GLUCOSE BLDC GLUCOMTR-MCNC: 226 MG/DL (ref 70–105)
GLUCOSE SERPL-MCNC: 184 MG/DL (ref 65–99)
HBA1C MFR BLD: 6.7 % (ref 3.5–5.6)
HCT VFR BLD AUTO: 41.1 % (ref 37.5–51)
HGB BLD-MCNC: 14 G/DL (ref 13–17.7)
LYMPHOCYTES # BLD AUTO: 2.3 10*3/MM3 (ref 0.7–3.1)
LYMPHOCYTES NFR BLD AUTO: 18.9 % (ref 19.6–45.3)
MCH RBC QN AUTO: 30.4 PG (ref 26.6–33)
MCHC RBC AUTO-ENTMCNC: 34 G/DL (ref 31.5–35.7)
MCV RBC AUTO: 89.3 FL (ref 79–97)
MONOCYTES # BLD AUTO: 0.9 10*3/MM3 (ref 0.1–0.9)
MONOCYTES NFR BLD AUTO: 7.5 % (ref 5–12)
NEUTROPHILS NFR BLD AUTO: 70.5 % (ref 42.7–76)
NEUTROPHILS NFR BLD AUTO: 8.5 10*3/MM3 (ref 1.7–7)
NRBC BLD AUTO-RTO: 0.1 /100 WBC (ref 0–0.2)
PLATELET # BLD AUTO: 321 10*3/MM3 (ref 140–450)
PMV BLD AUTO: 8.8 FL (ref 6–12)
POTASSIUM SERPL-SCNC: 3.8 MMOL/L (ref 3.5–5.2)
PROT SERPL-MCNC: 6 G/DL (ref 6–8.5)
RBC # BLD AUTO: 4.6 10*6/MM3 (ref 4.14–5.8)
SODIUM SERPL-SCNC: 135 MMOL/L (ref 136–145)
WBC # BLD AUTO: 12.1 10*3/MM3 (ref 3.4–10.8)

## 2020-12-28 PROCEDURE — 85025 COMPLETE CBC W/AUTO DIFF WBC: CPT | Performed by: NURSE PRACTITIONER

## 2020-12-28 PROCEDURE — 82962 GLUCOSE BLOOD TEST: CPT

## 2020-12-28 PROCEDURE — 94799 UNLISTED PULMONARY SVC/PX: CPT

## 2020-12-28 PROCEDURE — 63710000001 INSULIN LISPRO (HUMAN) PER 5 UNITS: Performed by: FAMILY MEDICINE

## 2020-12-28 PROCEDURE — 80053 COMPREHEN METABOLIC PANEL: CPT | Performed by: NURSE PRACTITIONER

## 2020-12-28 PROCEDURE — 25010000002 METHYLPREDNISOLONE PER 40 MG: Performed by: FAMILY MEDICINE

## 2020-12-28 RX ORDER — METHYLPREDNISOLONE 4 MG/1
TABLET ORAL
Qty: 1 EACH | Refills: 0 | Status: SHIPPED | OUTPATIENT
Start: 2020-12-28 | End: 2021-01-24 | Stop reason: HOSPADM

## 2020-12-28 RX ORDER — PREDNISONE 20 MG/1
20 TABLET ORAL DAILY
Status: DISCONTINUED | OUTPATIENT
Start: 2020-12-29 | End: 2020-12-28 | Stop reason: HOSPADM

## 2020-12-28 RX ADMIN — METHYLPREDNISOLONE SODIUM SUCCINATE 20 MG: 40 INJECTION, POWDER, FOR SOLUTION INTRAMUSCULAR; INTRAVENOUS at 09:02

## 2020-12-28 RX ADMIN — INSULIN LISPRO 4 UNITS: 100 INJECTION, SOLUTION INTRAVENOUS; SUBCUTANEOUS at 09:03

## 2020-12-28 RX ADMIN — IPRATROPIUM BROMIDE AND ALBUTEROL SULFATE 3 ML: 2.5; .5 SOLUTION RESPIRATORY (INHALATION) at 06:45

## 2020-12-28 RX ADMIN — BUDESONIDE 0.5 MG: 0.5 INHALANT RESPIRATORY (INHALATION) at 06:45

## 2020-12-28 RX ADMIN — IPRATROPIUM BROMIDE AND ALBUTEROL SULFATE 3 ML: 2.5; .5 SOLUTION RESPIRATORY (INHALATION) at 10:50

## 2020-12-28 RX ADMIN — METOPROLOL TARTRATE 25 MG: 25 TABLET, FILM COATED ORAL at 09:02

## 2020-12-28 RX ADMIN — PREGABALIN 150 MG: 75 CAPSULE ORAL at 09:02

## 2020-12-28 RX ADMIN — Medication 10 ML: at 09:02

## 2020-12-28 RX ADMIN — PRASUGREL 10 MG: 10 TABLET, FILM COATED ORAL at 09:02

## 2020-12-28 RX ADMIN — IPRATROPIUM BROMIDE AND ALBUTEROL SULFATE 3 ML: 2.5; .5 SOLUTION RESPIRATORY (INHALATION) at 02:44

## 2020-12-28 RX ADMIN — OXYCODONE 10 MG: 5 TABLET ORAL at 10:18

## 2020-12-28 RX ADMIN — INSULIN LISPRO 8 UNITS: 100 INJECTION, SOLUTION INTRAVENOUS; SUBCUTANEOUS at 12:02

## 2020-12-28 RX ADMIN — FUROSEMIDE 20 MG: 20 TABLET ORAL at 09:02

## 2020-12-28 RX ADMIN — LOSARTAN POTASSIUM 50 MG: 50 TABLET, FILM COATED ORAL at 10:09

## 2020-12-28 RX ADMIN — GUAIFENESIN, DEXTROMETHORPHAN HBR 1 TABLET: 600; 30 TABLET ORAL at 09:02

## 2020-12-28 RX ADMIN — POTASSIUM CHLORIDE 10 MEQ: 750 TABLET, EXTENDED RELEASE ORAL at 09:02

## 2020-12-29 ENCOUNTER — READMISSION MANAGEMENT (OUTPATIENT)
Dept: CALL CENTER | Facility: HOSPITAL | Age: 75
End: 2020-12-29

## 2020-12-29 NOTE — OUTREACH NOTE
Prep Survey      Responses   [facility] patient discharged from?  [facility]   Is LACE score < 7 ?  No   Emergency Room discharge w/ pulse ox?  No   Eligibility  Readm Mgmt   Discharge diagnosis  Diabetic ulcer of right midfoot associated with type 2 diabetes mellitus, dyspnea on exertion, acute exacerbation COPD    Does the patient have one of the following disease processes/diagnoses(primary or secondary)?  COPD/Pneumonia   Does the patient have Home health ordered?  No   Is there a DME ordered?  No   Medication alerts for this patient  continue prasugrel   Prep survey completed?  Yes          Kamini Smith RN

## 2020-12-30 ENCOUNTER — READMISSION MANAGEMENT (OUTPATIENT)
Dept: CALL CENTER | Facility: HOSPITAL | Age: 75
End: 2020-12-30

## 2020-12-30 ENCOUNTER — OFFICE VISIT (OUTPATIENT)
Dept: WOUND CARE | Facility: HOSPITAL | Age: 75
End: 2020-12-30

## 2020-12-30 DIAGNOSIS — E11.42 TYPE 2 DIABETES MELLITUS WITH DIABETIC POLYNEUROPATHY, UNSPECIFIED WHETHER LONG TERM INSULIN USE (HCC): ICD-10-CM

## 2020-12-30 DIAGNOSIS — L97.512 NON-PRESSURE CHRONIC ULCER OF OTHER PART OF RIGHT FOOT WITH FAT LAYER EXPOSED (HCC): ICD-10-CM

## 2020-12-30 PROCEDURE — G0463 HOSPITAL OUTPT CLINIC VISIT: HCPCS

## 2020-12-30 PROCEDURE — 99213 OFFICE O/P EST LOW 20 MIN: CPT | Performed by: PODIATRIST

## 2020-12-30 NOTE — OUTREACH NOTE
COPD/PN Week 1 Survey      Responses   [facility] patient discharged from?  [facility]   Does the patient have one of the following disease processes/diagnoses(primary or secondary)?  COPD/Pneumonia   Was the primary reason for admission:  COPD exacerbation   Week 1 attempt successful?  Yes   Call start time  1811   Call end time  1813   Discharge diagnosis  Diabetic ulcer of right midfoot associated with type 2 diabetes mellitus, dyspnea on exertion, acute exacerbation COPD    Meds reviewed with patient/caregiver?  Yes   Is the patient having any side effects they believe may be caused by any medication additions or changes?  No   Does the patient have all medications ordered at discharge?  Yes   Is the patient taking all medications as directed (includes completed medication regime)?  Yes   Does the patient have a primary care provider?   Yes   Does the patient have an appointment with their PCP or specialist within 7 days of discharge?  Yes   Has the patient kept scheduled appointments due by today?  Yes   Pulse Ox monitoring  None   Psychosocial issues?  No   Is the patient/caregiver able to teach back the hierarchy of who to call/visit for symptoms/problems? PCP, Specialist, Home health nurse, Urgent Care, ED, 911  Yes   Additional teach back comments  Call was brief and states he is doing fine.   Is the patient able to teach back COPD zones?  Yes   Patient reports what zone on this call?  Green Zone   Green Zone  Reports doing well, Breathing without shortness of breath, Sleeping well, Appetite is good   Green Zone interventions:  Take daily medications   Week 1 call completed?  Yes   Wrap up additional comments  Denies questions or needs at this time          Lali Sahu LPN

## 2020-12-31 ENCOUNTER — TELEPHONE (OUTPATIENT)
Dept: CARDIOLOGY | Facility: CLINIC | Age: 75
End: 2020-12-31

## 2020-12-31 DIAGNOSIS — L97.513 CHRONIC ULCER OF RIGHT FOOT WITH NECROSIS OF MUSCLE (HCC): Primary | ICD-10-CM

## 2021-01-02 ENCOUNTER — LAB (OUTPATIENT)
Dept: LAB | Facility: HOSPITAL | Age: 76
End: 2021-01-02

## 2021-01-02 DIAGNOSIS — L97.513 CHRONIC ULCER OF RIGHT FOOT WITH NECROSIS OF MUSCLE (HCC): ICD-10-CM

## 2021-01-02 LAB
ALBUMIN SERPL-MCNC: 4.1 G/DL (ref 3.5–5.2)
ALBUMIN/GLOB SERPL: 1.3 G/DL
ALP SERPL-CCNC: 132 U/L (ref 39–117)
ALT SERPL W P-5'-P-CCNC: 13 U/L (ref 1–41)
ANION GAP SERPL CALCULATED.3IONS-SCNC: 11.9 MMOL/L (ref 5–15)
AST SERPL-CCNC: 17 U/L (ref 1–40)
BILIRUB SERPL-MCNC: 0.7 MG/DL (ref 0–1.2)
BUN SERPL-MCNC: 31 MG/DL (ref 8–23)
BUN/CREAT SERPL: 25.4 (ref 7–25)
CALCIUM SPEC-SCNC: 9.2 MG/DL (ref 8.6–10.5)
CHLORIDE SERPL-SCNC: 101 MMOL/L (ref 98–107)
CO2 SERPL-SCNC: 24.1 MMOL/L (ref 22–29)
CREAT SERPL-MCNC: 1.22 MG/DL (ref 0.76–1.27)
DEPRECATED RDW RBC AUTO: 44.1 FL (ref 37–54)
ERYTHROCYTE [DISTWIDTH] IN BLOOD BY AUTOMATED COUNT: 14.1 % (ref 12.3–15.4)
GFR SERPL CREATININE-BSD FRML MDRD: 58 ML/MIN/1.73
GLOBULIN UR ELPH-MCNC: 3.1 GM/DL
GLUCOSE SERPL-MCNC: 145 MG/DL (ref 65–99)
HCT VFR BLD AUTO: 47.3 % (ref 37.5–51)
HGB BLD-MCNC: 15.9 G/DL (ref 13–17.7)
MCH RBC QN AUTO: 29.7 PG (ref 26.6–33)
MCHC RBC AUTO-ENTMCNC: 33.6 G/DL (ref 31.5–35.7)
MCV RBC AUTO: 88.4 FL (ref 79–97)
MRSA DNA SPEC QL NAA+PROBE: NORMAL
PLATELET # BLD AUTO: 342 10*3/MM3 (ref 140–450)
PMV BLD AUTO: 10.6 FL (ref 6–12)
POTASSIUM SERPL-SCNC: 4.4 MMOL/L (ref 3.5–5.2)
PROT SERPL-MCNC: 7.2 G/DL (ref 6–8.5)
RBC # BLD AUTO: 5.35 10*6/MM3 (ref 4.14–5.8)
SARS-COV-2 ORF1AB RESP QL NAA+PROBE: NOT DETECTED
SODIUM SERPL-SCNC: 137 MMOL/L (ref 136–145)
WBC # BLD AUTO: 11.34 10*3/MM3 (ref 3.4–10.8)

## 2021-01-02 PROCEDURE — U0004 COV-19 TEST NON-CDC HGH THRU: HCPCS

## 2021-01-02 PROCEDURE — 36415 COLL VENOUS BLD VENIPUNCTURE: CPT

## 2021-01-02 PROCEDURE — 85027 COMPLETE CBC AUTOMATED: CPT

## 2021-01-02 PROCEDURE — 87641 MR-STAPH DNA AMP PROBE: CPT

## 2021-01-02 PROCEDURE — 80053 COMPREHEN METABOLIC PANEL: CPT

## 2021-01-02 PROCEDURE — C9803 HOPD COVID-19 SPEC COLLECT: HCPCS

## 2021-01-05 NOTE — TELEPHONE ENCOUNTER
"Spoke with patient. Per patient, \"tell dr forrest not to worry about it he isnt my heart doctor anymore\"   "

## 2021-01-07 ENCOUNTER — READMISSION MANAGEMENT (OUTPATIENT)
Dept: CALL CENTER | Facility: HOSPITAL | Age: 76
End: 2021-01-07

## 2021-01-07 NOTE — OUTREACH NOTE
COPD/PN Week 2 Survey      Responses   [facility] patient discharged from?  [facility]   Does the patient have one of the following disease processes/diagnoses(primary or secondary)?  COPD/Pneumonia   Was the primary reason for admission:  COPD exacerbation   Week 2 attempt successful?  Yes   Call start time  1121   Call end time  1122   Discharge diagnosis  Diabetic ulcer of right midfoot associated with type 2 diabetes mellitus, dyspnea on exertion, acute exacerbation COPD    Is patient permission given to speak with other caregiver?  Yes   List who call center can speak with  spouse- Meghan   Person spoke with today (if not patient) and relationship  spouse- Meghan   Is the patient taking all medications as directed (includes completed medication regime)?  Yes   Comments regarding PCP  f/u with Dr. Jones next week and would clinic.   Has the patient kept scheduled appointments due by today?  Yes   Psychosocial issues?  No   What is the patient's perception of their health status since discharge?  Improving   Is the patient/caregiver able to teach back the hierarchy of who to call/visit for symptoms/problems? PCP, Specialist, Home health nurse, Urgent Care, ED, 911  Yes   Is the patient able to teach back COPD zones?  Yes   Patient reports what zone on this call?  Green Zone   Green Zone  Reports doing well   Week 2 call completed?  Yes   Wrap up additional comments  Per spouse, patient is doing great, no questions or concerns at this time.          Muriel Whitaker RN

## 2021-01-09 NOTE — TELEPHONE ENCOUNTER
30 Day rx only with no refills. Pt reported on 12/31/2020 that he has a new cardiologist. Future rx's will need to be obtained from new cardio. [place]

## 2021-01-11 RX ORDER — PRASUGREL 10 MG/1
TABLET, FILM COATED ORAL
Qty: 30 TABLET | Refills: 0 | Status: SHIPPED | OUTPATIENT
Start: 2021-01-11 | End: 2021-05-21 | Stop reason: HOSPADM

## 2021-01-14 ENCOUNTER — OFFICE VISIT (OUTPATIENT)
Dept: PAIN MEDICINE | Facility: CLINIC | Age: 76
End: 2021-01-14

## 2021-01-14 VITALS
HEART RATE: 75 BPM | BODY MASS INDEX: 32.47 KG/M2 | OXYGEN SATURATION: 97 % | RESPIRATION RATE: 16 BRPM | SYSTOLIC BLOOD PRESSURE: 136 MMHG | HEIGHT: 73 IN | TEMPERATURE: 96.6 F | DIASTOLIC BLOOD PRESSURE: 84 MMHG | WEIGHT: 245 LBS

## 2021-01-14 DIAGNOSIS — M54.16 LUMBAR RADICULOPATHY: ICD-10-CM

## 2021-01-14 DIAGNOSIS — M51.36 DDD (DEGENERATIVE DISC DISEASE), LUMBAR: ICD-10-CM

## 2021-01-14 DIAGNOSIS — G89.29 CHRONIC MIDLINE LOW BACK PAIN WITHOUT SCIATICA: Primary | ICD-10-CM

## 2021-01-14 DIAGNOSIS — M54.50 CHRONIC MIDLINE LOW BACK PAIN WITHOUT SCIATICA: Primary | ICD-10-CM

## 2021-01-14 DIAGNOSIS — M47.817 SPONDYLOSIS OF LUMBOSACRAL REGION WITHOUT MYELOPATHY OR RADICULOPATHY: ICD-10-CM

## 2021-01-14 PROCEDURE — 99213 OFFICE O/P EST LOW 20 MIN: CPT | Performed by: PHYSICAL MEDICINE & REHABILITATION

## 2021-01-14 RX ORDER — OXYCODONE AND ACETAMINOPHEN 10; 325 MG/1; MG/1
1 TABLET ORAL EVERY 6 HOURS PRN
Qty: 120 TABLET | Refills: 0 | Status: SHIPPED | OUTPATIENT
Start: 2021-01-14 | End: 2021-01-24 | Stop reason: HOSPADM

## 2021-01-14 RX ORDER — FORMOTEROL FUMARATE DIHYDRATE 20 UG/2ML
20 SOLUTION RESPIRATORY (INHALATION) 2 TIMES DAILY PRN
Status: ON HOLD | COMMUNITY
Start: 2020-12-30 | End: 2022-01-01

## 2021-01-14 RX ORDER — IPRATROPIUM BROMIDE AND ALBUTEROL SULFATE 2.5; .5 MG/3ML; MG/3ML
3 SOLUTION RESPIRATORY (INHALATION) 4 TIMES DAILY PRN
COMMUNITY
Start: 2020-12-30 | End: 2022-01-01 | Stop reason: HOSPADM

## 2021-01-14 RX ORDER — OXYCODONE AND ACETAMINOPHEN 10; 325 MG/1; MG/1
1 TABLET ORAL EVERY 6 HOURS PRN
Qty: 120 TABLET | Refills: 0 | Status: SHIPPED | OUTPATIENT
Start: 2021-01-14 | End: 2021-04-20 | Stop reason: SDUPTHER

## 2021-01-14 RX ORDER — DOXYCYCLINE 100 MG/1
CAPSULE ORAL
COMMUNITY
Start: 2021-01-06 | End: 2021-01-24 | Stop reason: HOSPADM

## 2021-01-14 RX ORDER — BUDESONIDE 0.5 MG/2ML
0.5 INHALANT ORAL 2 TIMES DAILY PRN
Status: ON HOLD | COMMUNITY
Start: 2020-12-30 | End: 2022-01-01
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
## 2021-01-18 ENCOUNTER — READMISSION MANAGEMENT (OUTPATIENT)
Dept: CALL CENTER | Facility: HOSPITAL | Age: 76
End: 2021-01-18

## 2021-01-18 NOTE — OUTREACH NOTE
COPD/PN Week 3 Survey      Responses   [facility] patient discharged from?  [facility]   Does the patient have one of the following disease processes/diagnoses(primary or secondary)?  COPD/Pneumonia   Was the primary reason for admission:  COPD exacerbation   Week 3 attempt successful?  Yes   Call start time  1243   Call end time  1251   Discharge diagnosis  Diabetic ulcer of right midfoot associated with type 2 diabetes mellitus, dyspnea on exertion, acute exacerbation COPD    Person spoke with today (if not patient) and relationship  spouse- Meghan   Meds reviewed with patient/caregiver?  Yes   Is the patient taking all medications as directed (includes completed medication regime)?  Yes   Has the patient kept scheduled appointments due by today?  Yes   Pulse Ox monitoring  None   Psychosocial issues?  No   Comments  Pt has periodic episodes of shaking per wife. He is not running a temp, in fact he is cold per wife. He does not have a glucometer at home to monitor gluc levels.No increased SOA she reports, he is more sleeping than normal the last couple of days. No confusion and he is easy to awaken. Pt is eating/drinking wife reports. Advised to call PCP today to discuss but wife reports that she will wait a couple of days to see how pt does.    What is the patient's perception of their health status since discharge?  Worsening   Nursing Interventions  Nurse provided patient education, Advised patient to call provider   Patient reports what zone on this call?  Green Zone   Green Zone  Breathing without shortness of breath, Usual activity and exercise level   Green Zone interventions:  Take daily medications   Week 3 call completed?  Yes          Cat Stern, IAN

## 2021-01-20 ENCOUNTER — HOSPITAL ENCOUNTER (OUTPATIENT)
Facility: HOSPITAL | Age: 76
Setting detail: OBSERVATION
Discharge: HOME HEALTH | End: 2021-01-24
Attending: FAMILY MEDICINE | Admitting: FAMILY MEDICINE

## 2021-01-20 DIAGNOSIS — L97.512: Primary | ICD-10-CM

## 2021-01-20 DIAGNOSIS — L97.513 CHRONIC ULCER OF RIGHT FOOT WITH NECROSIS OF MUSCLE (HCC): ICD-10-CM

## 2021-01-20 LAB
ALBUMIN SERPL-MCNC: 3.7 G/DL (ref 3.5–5.2)
ALBUMIN/GLOB SERPL: 1.2 G/DL
ALP SERPL-CCNC: 171 U/L (ref 39–117)
ALT SERPL W P-5'-P-CCNC: 11 U/L (ref 1–41)
ANION GAP SERPL CALCULATED.3IONS-SCNC: 9 MMOL/L (ref 5–15)
AST SERPL-CCNC: 13 U/L (ref 1–40)
BASOPHILS # BLD AUTO: 0.1 10*3/MM3 (ref 0–0.2)
BASOPHILS NFR BLD AUTO: 2 % (ref 0–1.5)
BILIRUB SERPL-MCNC: 0.7 MG/DL (ref 0–1.2)
BUN SERPL-MCNC: 32 MG/DL (ref 8–23)
BUN/CREAT SERPL: 21.5 (ref 7–25)
CALCIUM SPEC-SCNC: 9.2 MG/DL (ref 8.6–10.5)
CHLORIDE SERPL-SCNC: 98 MMOL/L (ref 98–107)
CO2 SERPL-SCNC: 29 MMOL/L (ref 22–29)
CREAT SERPL-MCNC: 1.49 MG/DL (ref 0.76–1.27)
DEPRECATED RDW RBC AUTO: 48.6 FL (ref 37–54)
EOSINOPHIL # BLD AUTO: 0.2 10*3/MM3 (ref 0–0.4)
EOSINOPHIL NFR BLD AUTO: 3.3 % (ref 0.3–6.2)
ERYTHROCYTE [DISTWIDTH] IN BLOOD BY AUTOMATED COUNT: 15.7 % (ref 12.3–15.4)
GFR SERPL CREATININE-BSD FRML MDRD: 46 ML/MIN/1.73
GLOBULIN UR ELPH-MCNC: 3.1 GM/DL
GLUCOSE SERPL-MCNC: 235 MG/DL (ref 65–99)
HCT VFR BLD AUTO: 41.1 % (ref 37.5–51)
HGB BLD-MCNC: 13.9 G/DL (ref 13–17.7)
LYMPHOCYTES # BLD AUTO: 1.5 10*3/MM3 (ref 0.7–3.1)
LYMPHOCYTES NFR BLD AUTO: 24 % (ref 19.6–45.3)
MCH RBC QN AUTO: 30.3 PG (ref 26.6–33)
MCHC RBC AUTO-ENTMCNC: 33.9 G/DL (ref 31.5–35.7)
MCV RBC AUTO: 89.5 FL (ref 79–97)
MONOCYTES # BLD AUTO: 0.5 10*3/MM3 (ref 0.1–0.9)
MONOCYTES NFR BLD AUTO: 7.9 % (ref 5–12)
NEUTROPHILS NFR BLD AUTO: 3.9 10*3/MM3 (ref 1.7–7)
NEUTROPHILS NFR BLD AUTO: 62.8 % (ref 42.7–76)
NRBC BLD AUTO-RTO: 0.1 /100 WBC (ref 0–0.2)
PLATELET # BLD AUTO: 303 10*3/MM3 (ref 140–450)
PMV BLD AUTO: 8.4 FL (ref 6–12)
POTASSIUM SERPL-SCNC: 4.7 MMOL/L (ref 3.5–5.2)
PROT SERPL-MCNC: 6.8 G/DL (ref 6–8.5)
RBC # BLD AUTO: 4.59 10*6/MM3 (ref 4.14–5.8)
SODIUM SERPL-SCNC: 136 MMOL/L (ref 136–145)
WBC # BLD AUTO: 6.2 10*3/MM3 (ref 3.4–10.8)

## 2021-01-20 PROCEDURE — 80053 COMPREHEN METABOLIC PANEL: CPT | Performed by: FAMILY MEDICINE

## 2021-01-20 PROCEDURE — G0378 HOSPITAL OBSERVATION PER HR: HCPCS

## 2021-01-20 PROCEDURE — 85025 COMPLETE CBC W/AUTO DIFF WBC: CPT | Performed by: FAMILY MEDICINE

## 2021-01-20 PROCEDURE — 83036 HEMOGLOBIN GLYCOSYLATED A1C: CPT | Performed by: NURSE PRACTITIONER

## 2021-01-20 RX ORDER — ONDANSETRON 2 MG/ML
4 INJECTION INTRAMUSCULAR; INTRAVENOUS EVERY 6 HOURS PRN
Status: DISCONTINUED | OUTPATIENT
Start: 2021-01-20 | End: 2021-01-24 | Stop reason: HOSPADM

## 2021-01-20 RX ORDER — PREGABALIN 75 MG/1
150 CAPSULE ORAL 2 TIMES DAILY
Status: DISCONTINUED | OUTPATIENT
Start: 2021-01-20 | End: 2021-01-24 | Stop reason: HOSPADM

## 2021-01-20 RX ORDER — INSULIN LISPRO 100 [IU]/ML
0-7 INJECTION, SOLUTION INTRAVENOUS; SUBCUTANEOUS
Status: DISCONTINUED | OUTPATIENT
Start: 2021-01-21 | End: 2021-01-24 | Stop reason: HOSPADM

## 2021-01-20 RX ORDER — DEXTROSE MONOHYDRATE 25 G/50ML
25 INJECTION, SOLUTION INTRAVENOUS
Status: DISCONTINUED | OUTPATIENT
Start: 2021-01-20 | End: 2021-01-24 | Stop reason: HOSPADM

## 2021-01-20 RX ORDER — BUDESONIDE 0.5 MG/2ML
0.5 INHALANT ORAL
Status: DISCONTINUED | OUTPATIENT
Start: 2021-01-20 | End: 2021-01-24 | Stop reason: HOSPADM

## 2021-01-20 RX ORDER — OXYCODONE HYDROCHLORIDE 5 MG/1
10 TABLET ORAL EVERY 4 HOURS PRN
Status: DISCONTINUED | OUTPATIENT
Start: 2021-01-20 | End: 2021-01-23

## 2021-01-20 RX ORDER — MIRTAZAPINE 15 MG/1
30 TABLET, FILM COATED ORAL NIGHTLY
Status: DISCONTINUED | OUTPATIENT
Start: 2021-01-20 | End: 2021-01-24 | Stop reason: HOSPADM

## 2021-01-20 RX ORDER — ACETAMINOPHEN 325 MG/1
650 TABLET ORAL EVERY 6 HOURS PRN
Status: DISCONTINUED | OUTPATIENT
Start: 2021-01-20 | End: 2021-01-24 | Stop reason: HOSPADM

## 2021-01-20 RX ORDER — IPRATROPIUM BROMIDE AND ALBUTEROL SULFATE 2.5; .5 MG/3ML; MG/3ML
3 SOLUTION RESPIRATORY (INHALATION)
Status: DISCONTINUED | OUTPATIENT
Start: 2021-01-20 | End: 2021-01-24 | Stop reason: HOSPADM

## 2021-01-20 RX ORDER — LOSARTAN POTASSIUM 50 MG/1
50 TABLET ORAL EVERY MORNING
Status: DISCONTINUED | OUTPATIENT
Start: 2021-01-21 | End: 2021-01-21

## 2021-01-20 RX ORDER — CALCIUM CARBONATE 200(500)MG
2 TABLET,CHEWABLE ORAL 3 TIMES DAILY PRN
Status: DISCONTINUED | OUTPATIENT
Start: 2021-01-20 | End: 2021-01-24 | Stop reason: HOSPADM

## 2021-01-20 RX ORDER — SODIUM CHLORIDE 9 MG/ML
100 INJECTION, SOLUTION INTRAVENOUS CONTINUOUS
Status: DISCONTINUED | OUTPATIENT
Start: 2021-01-20 | End: 2021-01-24 | Stop reason: HOSPADM

## 2021-01-20 RX ORDER — ATORVASTATIN CALCIUM 40 MG/1
40 TABLET, FILM COATED ORAL NIGHTLY
Status: DISCONTINUED | OUTPATIENT
Start: 2021-01-20 | End: 2021-01-24 | Stop reason: HOSPADM

## 2021-01-20 RX ORDER — NICOTINE POLACRILEX 4 MG
15 LOZENGE BUCCAL
Status: DISCONTINUED | OUTPATIENT
Start: 2021-01-20 | End: 2021-01-24 | Stop reason: HOSPADM

## 2021-01-20 RX ORDER — INSULIN LISPRO 100 [IU]/ML
0-7 INJECTION, SOLUTION INTRAVENOUS; SUBCUTANEOUS AS NEEDED
Status: DISCONTINUED | OUTPATIENT
Start: 2021-01-20 | End: 2021-01-24 | Stop reason: HOSPADM

## 2021-01-20 RX ORDER — LABETALOL HYDROCHLORIDE 5 MG/ML
20 INJECTION, SOLUTION INTRAVENOUS EVERY 6 HOURS PRN
Status: DISCONTINUED | OUTPATIENT
Start: 2021-01-20 | End: 2021-01-24 | Stop reason: HOSPADM

## 2021-01-20 RX ORDER — CHOLECALCIFEROL (VITAMIN D3) 125 MCG
5 CAPSULE ORAL NIGHTLY PRN
Status: DISCONTINUED | OUTPATIENT
Start: 2021-01-20 | End: 2021-01-24 | Stop reason: HOSPADM

## 2021-01-20 RX ADMIN — MIRTAZAPINE 30 MG: 15 TABLET, FILM COATED ORAL at 21:40

## 2021-01-20 RX ADMIN — OXYCODONE 10 MG: 5 TABLET ORAL at 21:41

## 2021-01-20 RX ADMIN — METOPROLOL TARTRATE 25 MG: 25 TABLET, FILM COATED ORAL at 21:40

## 2021-01-20 RX ADMIN — SODIUM CHLORIDE 100 ML/HR: 9 INJECTION, SOLUTION INTRAVENOUS at 21:49

## 2021-01-20 RX ADMIN — PREGABALIN 150 MG: 75 CAPSULE ORAL at 21:40

## 2021-01-20 RX ADMIN — ATORVASTATIN CALCIUM 40 MG: 40 TABLET, FILM COATED ORAL at 21:40

## 2021-01-21 ENCOUNTER — APPOINTMENT (OUTPATIENT)
Dept: NUCLEAR MEDICINE | Facility: HOSPITAL | Age: 76
End: 2021-01-21

## 2021-01-21 ENCOUNTER — READMISSION MANAGEMENT (OUTPATIENT)
Dept: CALL CENTER | Facility: HOSPITAL | Age: 76
End: 2021-01-21

## 2021-01-21 ENCOUNTER — ANESTHESIA EVENT (OUTPATIENT)
Dept: PERIOP | Facility: HOSPITAL | Age: 76
End: 2021-01-21

## 2021-01-21 LAB
BH CV STRESS BP STAGE 1: NORMAL
BH CV STRESS BP STAGE 2: NORMAL
BH CV STRESS BP STAGE 3: NORMAL
BH CV STRESS BP STAGE 4: NORMAL
BH CV STRESS COMMENTS STAGE 1: NORMAL
BH CV STRESS COMMENTS STAGE 2: NORMAL
BH CV STRESS DOSE REGADENOSON STAGE 1: 0.4
BH CV STRESS DURATION MIN STAGE 1: 0
BH CV STRESS DURATION MIN STAGE 2: 4
BH CV STRESS DURATION SEC STAGE 1: 10
BH CV STRESS DURATION SEC STAGE 2: 0
BH CV STRESS HR STAGE 1: 110
BH CV STRESS HR STAGE 2: 80
BH CV STRESS HR STAGE 3: 85
BH CV STRESS HR STAGE 4: 94
BH CV STRESS PROTOCOL 1: NORMAL
BH CV STRESS RECOVERY BP: NORMAL MMHG
BH CV STRESS RECOVERY HR: 70 BPM
BH CV STRESS STAGE 1: 1
BH CV STRESS STAGE 2: 2
BH CV STRESS STAGE 3: 3
BH CV STRESS STAGE 4: 4
GLUCOSE BLDC GLUCOMTR-MCNC: 206 MG/DL (ref 70–105)
GLUCOSE BLDC GLUCOMTR-MCNC: 217 MG/DL (ref 70–105)
GLUCOSE BLDC GLUCOMTR-MCNC: 256 MG/DL (ref 70–105)
GLUCOSE BLDC GLUCOMTR-MCNC: 262 MG/DL (ref 70–105)
HBA1C MFR BLD: 7.9 % (ref 3.5–5.6)
LV EF NUC BP: 52 %
MAXIMAL PREDICTED HEART RATE: 146 BPM
MRSA DNA SPEC QL NAA+PROBE: NORMAL
NT-PROBNP SERPL-MCNC: 369.6 PG/ML (ref 0–900)
PERCENT MAX PREDICTED HR: 82.19 %
SARS-COV-2 ORF1AB RESP QL NAA+PROBE: NOT DETECTED
STRESS BASELINE BP: NORMAL MMHG
STRESS BASELINE HR: 120 BPM
STRESS PERCENT HR: 97 %
STRESS POST PEAK BP: NORMAL MMHG
STRESS POST PEAK HR: 120 BPM
STRESS TARGET HR: 124 BPM
TROPONIN T SERPL-MCNC: <0.01 NG/ML (ref 0–0.03)
TROPONIN T SERPL-MCNC: <0.01 NG/ML (ref 0–0.03)

## 2021-01-21 PROCEDURE — A9500 TC99M SESTAMIBI: HCPCS | Performed by: FAMILY MEDICINE

## 2021-01-21 PROCEDURE — 82962 GLUCOSE BLOOD TEST: CPT

## 2021-01-21 PROCEDURE — 63710000001 INSULIN LISPRO (HUMAN) PER 5 UNITS: Performed by: PODIATRIST

## 2021-01-21 PROCEDURE — 63710000001 INSULIN LISPRO (HUMAN) PER 5 UNITS: Performed by: NURSE PRACTITIONER

## 2021-01-21 PROCEDURE — 93017 CV STRESS TEST TRACING ONLY: CPT

## 2021-01-21 PROCEDURE — U0004 COV-19 TEST NON-CDC HGH THRU: HCPCS | Performed by: FAMILY MEDICINE

## 2021-01-21 PROCEDURE — 78452 HT MUSCLE IMAGE SPECT MULT: CPT

## 2021-01-21 PROCEDURE — 94640 AIRWAY INHALATION TREATMENT: CPT

## 2021-01-21 PROCEDURE — 0 TECHNETIUM SESTAMIBI: Performed by: FAMILY MEDICINE

## 2021-01-21 PROCEDURE — 87641 MR-STAPH DNA AMP PROBE: CPT | Performed by: PODIATRIST

## 2021-01-21 PROCEDURE — 78452 HT MUSCLE IMAGE SPECT MULT: CPT | Performed by: INTERNAL MEDICINE

## 2021-01-21 PROCEDURE — 94799 UNLISTED PULMONARY SVC/PX: CPT

## 2021-01-21 PROCEDURE — 94760 N-INVAS EAR/PLS OXIMETRY 1: CPT

## 2021-01-21 PROCEDURE — 93018 CV STRESS TEST I&R ONLY: CPT | Performed by: INTERNAL MEDICINE

## 2021-01-21 PROCEDURE — 83880 ASSAY OF NATRIURETIC PEPTIDE: CPT | Performed by: NURSE PRACTITIONER

## 2021-01-21 PROCEDURE — G0378 HOSPITAL OBSERVATION PER HR: HCPCS

## 2021-01-21 PROCEDURE — 84484 ASSAY OF TROPONIN QUANT: CPT | Performed by: FAMILY MEDICINE

## 2021-01-21 PROCEDURE — 99214 OFFICE O/P EST MOD 30 MIN: CPT | Performed by: NURSE PRACTITIONER

## 2021-01-21 PROCEDURE — 25010000002 REGADENOSON 0.4 MG/5ML SOLUTION: Performed by: FAMILY MEDICINE

## 2021-01-21 RX ORDER — HYDROMORPHONE HCL 110MG/55ML
0.25 PATIENT CONTROLLED ANALGESIA SYRINGE INTRAVENOUS EVERY 4 HOURS PRN
Status: DISCONTINUED | OUTPATIENT
Start: 2021-01-21 | End: 2021-01-24 | Stop reason: HOSPADM

## 2021-01-21 RX ADMIN — TECHNETIUM TC 99M SESTAMIBI 1 DOSE: 1 INJECTION INTRAVENOUS at 09:20

## 2021-01-21 RX ADMIN — INSULIN LISPRO 3 UNITS: 100 INJECTION, SOLUTION INTRAVENOUS; SUBCUTANEOUS at 10:31

## 2021-01-21 RX ADMIN — IPRATROPIUM BROMIDE AND ALBUTEROL SULFATE 3 ML: 2.5; .5 SOLUTION RESPIRATORY (INHALATION) at 19:09

## 2021-01-21 RX ADMIN — INSULIN LISPRO 3 UNITS: 100 INJECTION, SOLUTION INTRAVENOUS; SUBCUTANEOUS at 12:43

## 2021-01-21 RX ADMIN — REGADENOSON 0.4 MG: 0.08 INJECTION, SOLUTION INTRAVENOUS at 09:20

## 2021-01-21 RX ADMIN — TECHNETIUM TC 99M SESTAMIBI 1 DOSE: 1 INJECTION INTRAVENOUS at 06:45

## 2021-01-21 RX ADMIN — OXYCODONE 10 MG: 5 TABLET ORAL at 17:26

## 2021-01-21 RX ADMIN — OXYCODONE 10 MG: 5 TABLET ORAL at 22:27

## 2021-01-21 RX ADMIN — BUDESONIDE 0.5 MG: 0.5 INHALANT RESPIRATORY (INHALATION) at 06:53

## 2021-01-21 RX ADMIN — PREGABALIN 150 MG: 75 CAPSULE ORAL at 11:26

## 2021-01-21 RX ADMIN — IPRATROPIUM BROMIDE AND ALBUTEROL SULFATE 3 ML: 2.5; .5 SOLUTION RESPIRATORY (INHALATION) at 06:53

## 2021-01-21 RX ADMIN — ATORVASTATIN CALCIUM 40 MG: 40 TABLET, FILM COATED ORAL at 20:52

## 2021-01-21 RX ADMIN — PREGABALIN 150 MG: 75 CAPSULE ORAL at 20:52

## 2021-01-21 RX ADMIN — MIRTAZAPINE 30 MG: 15 TABLET, FILM COATED ORAL at 20:52

## 2021-01-21 RX ADMIN — BUDESONIDE 0.5 MG: 0.5 INHALANT RESPIRATORY (INHALATION) at 19:09

## 2021-01-21 RX ADMIN — IPRATROPIUM BROMIDE AND ALBUTEROL SULFATE 3 ML: 2.5; .5 SOLUTION RESPIRATORY (INHALATION) at 14:48

## 2021-01-21 RX ADMIN — OXYCODONE 10 MG: 5 TABLET ORAL at 05:54

## 2021-01-21 RX ADMIN — LOSARTAN POTASSIUM 50 MG: 50 TABLET, FILM COATED ORAL at 05:55

## 2021-01-21 RX ADMIN — IPRATROPIUM BROMIDE AND ALBUTEROL SULFATE 3 ML: 2.5; .5 SOLUTION RESPIRATORY (INHALATION) at 10:46

## 2021-01-21 RX ADMIN — METOPROLOL TARTRATE 25 MG: 25 TABLET, FILM COATED ORAL at 20:51

## 2021-01-21 RX ADMIN — INSULIN LISPRO 4 UNITS: 100 INJECTION, SOLUTION INTRAVENOUS; SUBCUTANEOUS at 17:18

## 2021-01-21 RX ADMIN — METOPROLOL TARTRATE 25 MG: 25 TABLET, FILM COATED ORAL at 10:32

## 2021-01-21 RX ADMIN — OXYCODONE 10 MG: 5 TABLET ORAL at 12:44

## 2021-01-21 NOTE — OUTREACH NOTE
COPD/PN Week 4 Survey      Responses   [facility] facility patient discharged from?  [facility]   Does the patient have one of the following disease processes/diagnoses(primary or secondary)?  COPD/Pneumonia   Was the primary reason for admission:  COPD exacerbation   Week 4 attempt successful?  No   Revoke  Readmitted          Myriam Cruz RN

## 2021-01-22 ENCOUNTER — ANESTHESIA (OUTPATIENT)
Dept: PERIOP | Facility: HOSPITAL | Age: 76
End: 2021-01-22

## 2021-01-22 ENCOUNTER — APPOINTMENT (OUTPATIENT)
Dept: GENERAL RADIOLOGY | Facility: HOSPITAL | Age: 76
End: 2021-01-22

## 2021-01-22 LAB
ANION GAP SERPL CALCULATED.3IONS-SCNC: 6 MMOL/L (ref 5–15)
BASOPHILS # BLD AUTO: 0.1 10*3/MM3 (ref 0–0.2)
BASOPHILS NFR BLD AUTO: 1.1 % (ref 0–1.5)
BUN SERPL-MCNC: 25 MG/DL (ref 8–23)
BUN/CREAT SERPL: 18.9 (ref 7–25)
CALCIUM SPEC-SCNC: 8 MG/DL (ref 8.6–10.5)
CHLORIDE SERPL-SCNC: 100 MMOL/L (ref 98–107)
CO2 SERPL-SCNC: 26 MMOL/L (ref 22–29)
CREAT SERPL-MCNC: 1.32 MG/DL (ref 0.76–1.27)
DEPRECATED RDW RBC AUTO: 48.1 FL (ref 37–54)
EOSINOPHIL # BLD AUTO: 0.2 10*3/MM3 (ref 0–0.4)
EOSINOPHIL NFR BLD AUTO: 3.5 % (ref 0.3–6.2)
ERYTHROCYTE [DISTWIDTH] IN BLOOD BY AUTOMATED COUNT: 15.5 % (ref 12.3–15.4)
GFR SERPL CREATININE-BSD FRML MDRD: 53 ML/MIN/1.73
GLUCOSE BLDC GLUCOMTR-MCNC: 180 MG/DL (ref 70–105)
GLUCOSE BLDC GLUCOMTR-MCNC: 189 MG/DL (ref 70–105)
GLUCOSE BLDC GLUCOMTR-MCNC: 198 MG/DL (ref 70–105)
GLUCOSE BLDC GLUCOMTR-MCNC: 209 MG/DL (ref 70–105)
GLUCOSE BLDC GLUCOMTR-MCNC: 217 MG/DL (ref 70–105)
GLUCOSE SERPL-MCNC: 257 MG/DL (ref 65–99)
HCT VFR BLD AUTO: 35 % (ref 37.5–51)
HGB BLD-MCNC: 12 G/DL (ref 13–17.7)
LYMPHOCYTES # BLD AUTO: 1.5 10*3/MM3 (ref 0.7–3.1)
LYMPHOCYTES NFR BLD AUTO: 24.4 % (ref 19.6–45.3)
MCH RBC QN AUTO: 30.4 PG (ref 26.6–33)
MCHC RBC AUTO-ENTMCNC: 34.2 G/DL (ref 31.5–35.7)
MCV RBC AUTO: 88.9 FL (ref 79–97)
MONOCYTES # BLD AUTO: 0.7 10*3/MM3 (ref 0.1–0.9)
MONOCYTES NFR BLD AUTO: 10.7 % (ref 5–12)
NEUTROPHILS NFR BLD AUTO: 3.7 10*3/MM3 (ref 1.7–7)
NEUTROPHILS NFR BLD AUTO: 60.3 % (ref 42.7–76)
NRBC BLD AUTO-RTO: 0 /100 WBC (ref 0–0.2)
PLATELET # BLD AUTO: 279 10*3/MM3 (ref 140–450)
PMV BLD AUTO: 8.4 FL (ref 6–12)
POTASSIUM SERPL-SCNC: 4.6 MMOL/L (ref 3.5–5.2)
RBC # BLD AUTO: 3.94 10*6/MM3 (ref 4.14–5.8)
SODIUM SERPL-SCNC: 132 MMOL/L (ref 136–145)
WBC # BLD AUTO: 6.1 10*3/MM3 (ref 3.4–10.8)

## 2021-01-22 PROCEDURE — 25010000002 HYDROMORPHONE PER 4 MG: Performed by: PODIATRIST

## 2021-01-22 PROCEDURE — 63710000001 INSULIN LISPRO (HUMAN) PER 5 UNITS: Performed by: NURSE PRACTITIONER

## 2021-01-22 PROCEDURE — 96360 HYDRATION IV INFUSION INIT: CPT

## 2021-01-22 PROCEDURE — 63710000001 INSULIN LISPRO (HUMAN) PER 5 UNITS: Performed by: PODIATRIST

## 2021-01-22 PROCEDURE — 25010000002 FENTANYL CITRATE (PF) 100 MCG/2ML SOLUTION: Performed by: NURSE ANESTHETIST, CERTIFIED REGISTERED

## 2021-01-22 PROCEDURE — G0378 HOSPITAL OBSERVATION PER HR: HCPCS

## 2021-01-22 PROCEDURE — 87075 CULTR BACTERIA EXCEPT BLOOD: CPT | Performed by: PODIATRIST

## 2021-01-22 PROCEDURE — 85025 COMPLETE CBC W/AUTO DIFF WBC: CPT | Performed by: FAMILY MEDICINE

## 2021-01-22 PROCEDURE — 25010000002 PROPOFOL 10 MG/ML EMULSION: Performed by: NURSE ANESTHETIST, CERTIFIED REGISTERED

## 2021-01-22 PROCEDURE — 88305 TISSUE EXAM BY PATHOLOGIST: CPT | Performed by: PODIATRIST

## 2021-01-22 PROCEDURE — G0108 DIAB MANAGE TRN  PER INDIV: HCPCS

## 2021-01-22 PROCEDURE — 94799 UNLISTED PULMONARY SVC/PX: CPT

## 2021-01-22 PROCEDURE — 25010000002 ONDANSETRON PER 1 MG: Performed by: NURSE ANESTHETIST, CERTIFIED REGISTERED

## 2021-01-22 PROCEDURE — 88311 DECALCIFY TISSUE: CPT | Performed by: PODIATRIST

## 2021-01-22 PROCEDURE — 80048 BASIC METABOLIC PNL TOTAL CA: CPT | Performed by: FAMILY MEDICINE

## 2021-01-22 PROCEDURE — 25010000002 MIDAZOLAM PER 1 MG: Performed by: NURSE ANESTHETIST, CERTIFIED REGISTERED

## 2021-01-22 PROCEDURE — 87205 SMEAR GRAM STAIN: CPT | Performed by: PODIATRIST

## 2021-01-22 PROCEDURE — 87070 CULTURE OTHR SPECIMN AEROBIC: CPT | Performed by: PODIATRIST

## 2021-01-22 PROCEDURE — 96361 HYDRATE IV INFUSION ADD-ON: CPT

## 2021-01-22 PROCEDURE — 25010000002 ENOXAPARIN PER 10 MG: Performed by: PODIATRIST

## 2021-01-22 PROCEDURE — 82962 GLUCOSE BLOOD TEST: CPT

## 2021-01-22 PROCEDURE — 73630 X-RAY EXAM OF FOOT: CPT

## 2021-01-22 PROCEDURE — 25010000002 VANCOMYCIN 1 G RECONSTITUTED SOLUTION: Performed by: NURSE ANESTHETIST, CERTIFIED REGISTERED

## 2021-01-22 RX ORDER — CEFAZOLIN SODIUM IN 0.9 % NACL 3 G/100 ML
3 INTRAVENOUS SOLUTION, PIGGYBACK (ML) INTRAVENOUS ONCE
Status: DISCONTINUED | OUTPATIENT
Start: 2021-01-22 | End: 2021-01-22 | Stop reason: HOSPADM

## 2021-01-22 RX ORDER — FENTANYL CITRATE 50 UG/ML
INJECTION, SOLUTION INTRAMUSCULAR; INTRAVENOUS AS NEEDED
Status: DISCONTINUED | OUTPATIENT
Start: 2021-01-22 | End: 2021-01-22 | Stop reason: SURG

## 2021-01-22 RX ORDER — SODIUM CHLORIDE, SODIUM LACTATE, POTASSIUM CHLORIDE, CALCIUM CHLORIDE 600; 310; 30; 20 MG/100ML; MG/100ML; MG/100ML; MG/100ML
9 INJECTION, SOLUTION INTRAVENOUS CONTINUOUS PRN
Status: DISCONTINUED | OUTPATIENT
Start: 2021-01-22 | End: 2021-01-22 | Stop reason: HOSPADM

## 2021-01-22 RX ORDER — SODIUM CHLORIDE 0.9 % (FLUSH) 0.9 %
10 SYRINGE (ML) INJECTION AS NEEDED
Status: DISCONTINUED | OUTPATIENT
Start: 2021-01-22 | End: 2021-01-22 | Stop reason: HOSPADM

## 2021-01-22 RX ORDER — BUPIVACAINE HYDROCHLORIDE 2.5 MG/ML
INJECTION, SOLUTION EPIDURAL; INFILTRATION; INTRACAUDAL AS NEEDED
Status: DISCONTINUED | OUTPATIENT
Start: 2021-01-22 | End: 2021-01-22 | Stop reason: HOSPADM

## 2021-01-22 RX ORDER — BUPIVACAINE HYDROCHLORIDE 5 MG/ML
INJECTION, SOLUTION PERINEURAL AS NEEDED
Status: DISCONTINUED | OUTPATIENT
Start: 2021-01-22 | End: 2021-01-22 | Stop reason: HOSPADM

## 2021-01-22 RX ORDER — LANCETS
1 EACH MISCELLANEOUS DAILY
Qty: 100 EACH | Refills: 0 | OUTPATIENT
Start: 2021-01-22

## 2021-01-22 RX ORDER — LIDOCAINE HYDROCHLORIDE 10 MG/ML
INJECTION, SOLUTION EPIDURAL; INFILTRATION; INTRACAUDAL; PERINEURAL AS NEEDED
Status: DISCONTINUED | OUTPATIENT
Start: 2021-01-22 | End: 2021-01-22 | Stop reason: SURG

## 2021-01-22 RX ORDER — SODIUM CHLORIDE 0.9 % (FLUSH) 0.9 %
3 SYRINGE (ML) INJECTION EVERY 12 HOURS SCHEDULED
Status: DISCONTINUED | OUTPATIENT
Start: 2021-01-22 | End: 2021-01-22 | Stop reason: HOSPADM

## 2021-01-22 RX ORDER — PROPOFOL 10 MG/ML
VIAL (ML) INTRAVENOUS AS NEEDED
Status: DISCONTINUED | OUTPATIENT
Start: 2021-01-22 | End: 2021-01-22 | Stop reason: SURG

## 2021-01-22 RX ORDER — MIDAZOLAM HYDROCHLORIDE 1 MG/ML
INJECTION INTRAMUSCULAR; INTRAVENOUS AS NEEDED
Status: DISCONTINUED | OUTPATIENT
Start: 2021-01-22 | End: 2021-01-22 | Stop reason: SURG

## 2021-01-22 RX ORDER — ACETAMINOPHEN 325 MG/1
650 TABLET ORAL ONCE AS NEEDED
Status: DISCONTINUED | OUTPATIENT
Start: 2021-01-22 | End: 2021-01-22 | Stop reason: HOSPADM

## 2021-01-22 RX ORDER — ACETAMINOPHEN 650 MG/1
650 SUPPOSITORY RECTAL ONCE AS NEEDED
Status: DISCONTINUED | OUTPATIENT
Start: 2021-01-22 | End: 2021-01-22 | Stop reason: HOSPADM

## 2021-01-22 RX ORDER — ACETAMINOPHEN 650 MG
TABLET, EXTENDED RELEASE ORAL AS NEEDED
Status: DISCONTINUED | OUTPATIENT
Start: 2021-01-22 | End: 2021-01-22 | Stop reason: HOSPADM

## 2021-01-22 RX ORDER — FENTANYL CITRATE 50 UG/ML
25 INJECTION, SOLUTION INTRAMUSCULAR; INTRAVENOUS
Status: DISCONTINUED | OUTPATIENT
Start: 2021-01-22 | End: 2021-01-22 | Stop reason: HOSPADM

## 2021-01-22 RX ORDER — BLOOD SUGAR DIAGNOSTIC
1 STRIP MISCELLANEOUS DAILY
Qty: 100 EACH | Refills: 0 | OUTPATIENT
Start: 2021-01-22

## 2021-01-22 RX ORDER — ISOPROPYL ALCOHOL 700 MG/ML
1 CLOTH TOPICAL DAILY
Qty: 100 EACH | Refills: 0 | OUTPATIENT
Start: 2021-01-22

## 2021-01-22 RX ORDER — SODIUM CHLORIDE 0.9 % (FLUSH) 0.9 %
3-10 SYRINGE (ML) INJECTION AS NEEDED
Status: DISCONTINUED | OUTPATIENT
Start: 2021-01-22 | End: 2021-01-22 | Stop reason: HOSPADM

## 2021-01-22 RX ORDER — LIDOCAINE HYDROCHLORIDE 10 MG/ML
0.5 INJECTION, SOLUTION EPIDURAL; INFILTRATION; INTRACAUDAL; PERINEURAL ONCE AS NEEDED
Status: DISCONTINUED | OUTPATIENT
Start: 2021-01-22 | End: 2021-01-22 | Stop reason: HOSPADM

## 2021-01-22 RX ORDER — PHENYLEPHRINE HCL IN 0.9% NACL 0.5 MG/5ML
SYRINGE (ML) INTRAVENOUS AS NEEDED
Status: DISCONTINUED | OUTPATIENT
Start: 2021-01-22 | End: 2021-01-22 | Stop reason: SURG

## 2021-01-22 RX ORDER — ONDANSETRON 2 MG/ML
4 INJECTION INTRAMUSCULAR; INTRAVENOUS ONCE AS NEEDED
Status: COMPLETED | OUTPATIENT
Start: 2021-01-22 | End: 2021-01-22

## 2021-01-22 RX ORDER — EPHEDRINE SULFATE/0.9% NACL/PF 25 MG/5 ML
SYRINGE (ML) INTRAVENOUS AS NEEDED
Status: DISCONTINUED | OUTPATIENT
Start: 2021-01-22 | End: 2021-01-22 | Stop reason: SURG

## 2021-01-22 RX ORDER — VANCOMYCIN HYDROCHLORIDE 1 G/20ML
INJECTION, POWDER, LYOPHILIZED, FOR SOLUTION INTRAVENOUS AS NEEDED
Status: DISCONTINUED | OUTPATIENT
Start: 2021-01-22 | End: 2021-01-22 | Stop reason: SURG

## 2021-01-22 RX ADMIN — OXYCODONE 10 MG: 5 TABLET ORAL at 15:31

## 2021-01-22 RX ADMIN — FENTANYL CITRATE 25 MCG: 50 INJECTION, SOLUTION INTRAMUSCULAR; INTRAVENOUS at 08:21

## 2021-01-22 RX ADMIN — SODIUM CHLORIDE 100 ML/HR: 9 INJECTION, SOLUTION INTRAVENOUS at 17:26

## 2021-01-22 RX ADMIN — BUDESONIDE 0.5 MG: 0.5 INHALANT RESPIRATORY (INHALATION) at 20:48

## 2021-01-22 RX ADMIN — METOPROLOL TARTRATE 25 MG: 25 TABLET, FILM COATED ORAL at 11:46

## 2021-01-22 RX ADMIN — PROPOFOL 100 MG: 10 INJECTION, EMULSION INTRAVENOUS at 07:53

## 2021-01-22 RX ADMIN — INSULIN LISPRO 2 UNITS: 100 INJECTION, SOLUTION INTRAVENOUS; SUBCUTANEOUS at 11:30

## 2021-01-22 RX ADMIN — INSULIN LISPRO 3 UNITS: 100 INJECTION, SOLUTION INTRAVENOUS; SUBCUTANEOUS at 17:21

## 2021-01-22 RX ADMIN — VANCOMYCIN HYDROCHLORIDE 1 G: 1 INJECTION, POWDER, LYOPHILIZED, FOR SOLUTION INTRAVENOUS at 08:11

## 2021-01-22 RX ADMIN — LIDOCAINE HYDROCHLORIDE 50 MG: 10 INJECTION, SOLUTION EPIDURAL; INFILTRATION; INTRACAUDAL; PERINEURAL at 07:53

## 2021-01-22 RX ADMIN — INSULIN LISPRO 2 UNITS: 100 INJECTION, SOLUTION INTRAVENOUS; SUBCUTANEOUS at 07:30

## 2021-01-22 RX ADMIN — FENTANYL CITRATE 25 MCG: 50 INJECTION, SOLUTION INTRAMUSCULAR; INTRAVENOUS at 08:07

## 2021-01-22 RX ADMIN — MIRTAZAPINE 30 MG: 15 TABLET, FILM COATED ORAL at 20:10

## 2021-01-22 RX ADMIN — MIDAZOLAM 1 MG: 1 INJECTION INTRAMUSCULAR; INTRAVENOUS at 07:45

## 2021-01-22 RX ADMIN — PREGABALIN 150 MG: 75 CAPSULE ORAL at 20:10

## 2021-01-22 RX ADMIN — PREGABALIN 150 MG: 75 CAPSULE ORAL at 11:46

## 2021-01-22 RX ADMIN — METOPROLOL TARTRATE 25 MG: 25 TABLET, FILM COATED ORAL at 20:10

## 2021-01-22 RX ADMIN — ATORVASTATIN CALCIUM 40 MG: 40 TABLET, FILM COATED ORAL at 20:10

## 2021-01-22 RX ADMIN — FENTANYL CITRATE 50 MCG: 50 INJECTION, SOLUTION INTRAMUSCULAR; INTRAVENOUS at 09:25

## 2021-01-22 RX ADMIN — FENTANYL CITRATE 50 MCG: 50 INJECTION, SOLUTION INTRAMUSCULAR; INTRAVENOUS at 09:05

## 2021-01-22 RX ADMIN — OXYCODONE 10 MG: 5 TABLET ORAL at 02:44

## 2021-01-22 RX ADMIN — SODIUM CHLORIDE 100 ML/HR: 9 INJECTION, SOLUTION INTRAVENOUS at 02:40

## 2021-01-22 RX ADMIN — HYDROMORPHONE HYDROCHLORIDE 0.25 MG: 2 INJECTION, SOLUTION INTRAMUSCULAR; INTRAVENOUS; SUBCUTANEOUS at 22:14

## 2021-01-22 RX ADMIN — PHENYLEPHRINE HYDROCHLORIDE 100 MCG: 10 INJECTION INTRAVENOUS at 08:17

## 2021-01-22 RX ADMIN — BUDESONIDE 0.5 MG: 0.5 INHALANT RESPIRATORY (INHALATION) at 05:25

## 2021-01-22 RX ADMIN — OXYCODONE 10 MG: 5 TABLET ORAL at 20:09

## 2021-01-22 RX ADMIN — IPRATROPIUM BROMIDE AND ALBUTEROL SULFATE 3 ML: 2.5; .5 SOLUTION RESPIRATORY (INHALATION) at 20:48

## 2021-01-22 RX ADMIN — Medication 5 MG: at 08:03

## 2021-01-22 RX ADMIN — IPRATROPIUM BROMIDE AND ALBUTEROL SULFATE 3 ML: 2.5; .5 SOLUTION RESPIRATORY (INHALATION) at 15:36

## 2021-01-22 RX ADMIN — ENOXAPARIN SODIUM 40 MG: 40 INJECTION SUBCUTANEOUS at 15:31

## 2021-01-22 RX ADMIN — OXYCODONE 10 MG: 5 TABLET ORAL at 11:47

## 2021-01-22 RX ADMIN — FENTANYL CITRATE 50 MCG: 50 INJECTION, SOLUTION INTRAMUSCULAR; INTRAVENOUS at 07:53

## 2021-01-22 RX ADMIN — INSULIN LISPRO 2 UNITS: 100 INJECTION, SOLUTION INTRAVENOUS; SUBCUTANEOUS at 09:03

## 2021-01-22 RX ADMIN — HYDROMORPHONE HYDROCHLORIDE 0.25 MG: 2 INJECTION, SOLUTION INTRAMUSCULAR; INTRAVENOUS; SUBCUTANEOUS at 17:21

## 2021-01-22 RX ADMIN — ONDANSETRON 4 MG: 2 INJECTION, SOLUTION INTRAMUSCULAR; INTRAVENOUS at 09:13

## 2021-01-22 NOTE — ANESTHESIA PROCEDURE NOTES
Airway  Date/Time: 1/22/2021 7:54 AM  Airway not difficult    General Information and Staff    Patient location during procedure: OR  Anesthesiologist: Miquel Fisher MD  CRNA: Anne Napoles CRNA    Indications and Patient Condition  Indications for airway management: airway protection    Preoxygenated: yes  MILS not maintained throughout  Mask difficulty assessment: 0 - not attempted    Final Airway Details  Final airway type: supraglottic airway      Successful airway: flexible and LMA  Size 5  Cuff Pressure (cm H2O): 6    Number of attempts at approach: 1  Assessment: lips, teeth, and gum same as pre-op

## 2021-01-22 NOTE — ANESTHESIA PREPROCEDURE EVALUATION
Anesthesia Evaluation     NPO Solid Status: > 8 hours  NPO Liquid Status: > 8 hours           Airway   Mallampati: II  TM distance: >3 FB  No difficulty expected  Dental    (+) upper dentures and lower dentures    Pulmonary    (+) COPD, shortness of breath, sleep apnea,     ROS comment: Untreated jt   Cardiovascular     (+) pacemaker, hypertension, CAD, dysrhythmias, angina, CHF , PVD,       Neuro/Psych  GI/Hepatic/Renal/Endo    (+)  GERD,  liver disease fatty liver disease, renal disease, diabetes mellitus,     Musculoskeletal     Abdominal    Substance History      OB/GYN          Other   arthritis,    history of cancer                    Anesthesia Plan    ASA 4     general     intravenous induction     Anesthetic plan, all risks, benefits, and alternatives have been provided, discussed and informed consent has been obtained with: patient.

## 2021-01-22 NOTE — ANESTHESIA POSTPROCEDURE EVALUATION
Patient: Ro Nathan    Procedure Summary     Date: 01/22/21 Room / Location: [facility] OR 06 / [facility] MAIN OR    Anesthesia Start: 0739 Anesthesia Stop: 0850    Procedure: Wound excision with fifth metatarsal head resection, right foot. (Right Foot) Diagnosis:       Ischemic ulcer of foot with fat layer exposed, right (CMS/HCC)      (Ischemic ulcer of foot with fat layer exposed, right (CMS/HCC) [L97.512])    Surgeon: Josef Egan DPM Provider: Miquel Fisher MD    Anesthesia Type: general ASA Status: 4          Anesthesia Type: general    Vitals  Vitals Value Taken Time   /48 01/22/21 0938   Temp 97.7 °F (36.5 °C) 01/22/21 0938   Pulse 72 01/22/21 0939   Resp 13 01/22/21 0938   SpO2 96 % 01/22/21 0939   Vitals shown include unvalidated device data.        Post Anesthesia Care and Evaluation    Patient location during evaluation: PACU  Patient participation: complete - patient participated  Level of consciousness: awake  Pain scale: See nurse's notes for pain score.  Pain management: adequate  Airway patency: patent  Anesthetic complications: No anesthetic complications  PONV Status: none  Cardiovascular status: acceptable  Respiratory status: acceptable  Hydration status: acceptable    Comments: Patient seen and examined postoperatively; vital signs stable; SpO2 greater than or equal to 90%; cardiopulmonary status stable; nausea/vomiting adequately controlled; pain adequately controlled; no apparent anesthesia complications; patient discharged from anesthesia care when discharge criteria were met

## 2021-01-23 LAB
GLUCOSE BLDC GLUCOMTR-MCNC: 166 MG/DL (ref 70–105)
GLUCOSE BLDC GLUCOMTR-MCNC: 167 MG/DL (ref 70–105)
GLUCOSE BLDC GLUCOMTR-MCNC: 179 MG/DL (ref 70–105)
GLUCOSE BLDC GLUCOMTR-MCNC: 206 MG/DL (ref 70–105)

## 2021-01-23 PROCEDURE — 63710000001 INSULIN LISPRO (HUMAN) PER 5 UNITS: Performed by: PODIATRIST

## 2021-01-23 PROCEDURE — 82962 GLUCOSE BLOOD TEST: CPT

## 2021-01-23 PROCEDURE — 25010000002 HYDROMORPHONE PER 4 MG: Performed by: PODIATRIST

## 2021-01-23 PROCEDURE — 25010000002 ENOXAPARIN PER 10 MG: Performed by: PODIATRIST

## 2021-01-23 PROCEDURE — 25010000002 ONDANSETRON PER 1 MG: Performed by: PODIATRIST

## 2021-01-23 PROCEDURE — G0378 HOSPITAL OBSERVATION PER HR: HCPCS

## 2021-01-23 PROCEDURE — 94799 UNLISTED PULMONARY SVC/PX: CPT

## 2021-01-23 PROCEDURE — 97162 PT EVAL MOD COMPLEX 30 MIN: CPT

## 2021-01-23 RX ORDER — ACETAMINOPHEN 500 MG
500 TABLET ORAL EVERY 4 HOURS PRN
Status: DISCONTINUED | OUTPATIENT
Start: 2021-01-23 | End: 2021-01-24 | Stop reason: HOSPADM

## 2021-01-23 RX ORDER — OXYCODONE HYDROCHLORIDE 5 MG/1
15 TABLET ORAL EVERY 4 HOURS PRN
Status: DISCONTINUED | OUTPATIENT
Start: 2021-01-23 | End: 2021-01-24 | Stop reason: HOSPADM

## 2021-01-23 RX ADMIN — METOPROLOL TARTRATE 25 MG: 25 TABLET, FILM COATED ORAL at 20:13

## 2021-01-23 RX ADMIN — MIRTAZAPINE 30 MG: 15 TABLET, FILM COATED ORAL at 20:13

## 2021-01-23 RX ADMIN — SODIUM CHLORIDE 100 ML/HR: 9 INJECTION, SOLUTION INTRAVENOUS at 14:17

## 2021-01-23 RX ADMIN — ONDANSETRON 4 MG: 2 INJECTION, SOLUTION INTRAMUSCULAR; INTRAVENOUS at 12:53

## 2021-01-23 RX ADMIN — HYDROMORPHONE HYDROCHLORIDE 0.25 MG: 2 INJECTION, SOLUTION INTRAMUSCULAR; INTRAVENOUS; SUBCUTANEOUS at 12:45

## 2021-01-23 RX ADMIN — IPRATROPIUM BROMIDE AND ALBUTEROL SULFATE 3 ML: 2.5; .5 SOLUTION RESPIRATORY (INHALATION) at 07:29

## 2021-01-23 RX ADMIN — HYDROMORPHONE HYDROCHLORIDE 0.25 MG: 2 INJECTION, SOLUTION INTRAMUSCULAR; INTRAVENOUS; SUBCUTANEOUS at 03:10

## 2021-01-23 RX ADMIN — SODIUM CHLORIDE 100 ML/HR: 9 INJECTION, SOLUTION INTRAVENOUS at 03:12

## 2021-01-23 RX ADMIN — OXYCODONE 10 MG: 5 TABLET ORAL at 06:05

## 2021-01-23 RX ADMIN — OXYCODONE 10 MG: 5 TABLET ORAL at 01:00

## 2021-01-23 RX ADMIN — IPRATROPIUM BROMIDE AND ALBUTEROL SULFATE 3 ML: 2.5; .5 SOLUTION RESPIRATORY (INHALATION) at 15:19

## 2021-01-23 RX ADMIN — ENOXAPARIN SODIUM 40 MG: 40 INJECTION SUBCUTANEOUS at 20:14

## 2021-01-23 RX ADMIN — HYDROMORPHONE HYDROCHLORIDE 0.25 MG: 2 INJECTION, SOLUTION INTRAMUSCULAR; INTRAVENOUS; SUBCUTANEOUS at 08:25

## 2021-01-23 RX ADMIN — OXYCODONE HYDROCHLORIDE 15 MG: 5 TABLET ORAL at 22:33

## 2021-01-23 RX ADMIN — METOPROLOL TARTRATE 25 MG: 25 TABLET, FILM COATED ORAL at 08:24

## 2021-01-23 RX ADMIN — OXYCODONE HYDROCHLORIDE 15 MG: 5 TABLET ORAL at 16:41

## 2021-01-23 RX ADMIN — PREGABALIN 150 MG: 75 CAPSULE ORAL at 20:14

## 2021-01-23 RX ADMIN — HYDROMORPHONE HYDROCHLORIDE 0.25 MG: 2 INJECTION, SOLUTION INTRAMUSCULAR; INTRAVENOUS; SUBCUTANEOUS at 20:15

## 2021-01-23 RX ADMIN — ATORVASTATIN CALCIUM 40 MG: 40 TABLET, FILM COATED ORAL at 20:13

## 2021-01-23 RX ADMIN — ACETAMINOPHEN 500 MG: 500 TABLET, FILM COATED ORAL at 16:41

## 2021-01-23 RX ADMIN — OXYCODONE 10 MG: 5 TABLET ORAL at 11:42

## 2021-01-23 RX ADMIN — INSULIN LISPRO 2 UNITS: 100 INJECTION, SOLUTION INTRAVENOUS; SUBCUTANEOUS at 16:42

## 2021-01-23 RX ADMIN — PREGABALIN 150 MG: 75 CAPSULE ORAL at 08:24

## 2021-01-23 RX ADMIN — BUDESONIDE 0.5 MG: 0.5 INHALANT RESPIRATORY (INHALATION) at 07:31

## 2021-01-23 RX ADMIN — INSULIN LISPRO 2 UNITS: 100 INJECTION, SOLUTION INTRAVENOUS; SUBCUTANEOUS at 11:43

## 2021-01-23 RX ADMIN — INSULIN LISPRO 2 UNITS: 100 INJECTION, SOLUTION INTRAVENOUS; SUBCUTANEOUS at 07:39

## 2021-01-24 VITALS
HEART RATE: 79 BPM | OXYGEN SATURATION: 94 % | WEIGHT: 300.5 LBS | HEIGHT: 74 IN | RESPIRATION RATE: 17 BRPM | BODY MASS INDEX: 38.56 KG/M2 | SYSTOLIC BLOOD PRESSURE: 128 MMHG | DIASTOLIC BLOOD PRESSURE: 80 MMHG | TEMPERATURE: 97.9 F

## 2021-01-24 LAB
BACTERIA SPEC AEROBE CULT: NORMAL
GLUCOSE BLDC GLUCOMTR-MCNC: 151 MG/DL (ref 70–105)
GLUCOSE BLDC GLUCOMTR-MCNC: 190 MG/DL (ref 70–105)
GLUCOSE BLDC GLUCOMTR-MCNC: 198 MG/DL (ref 70–105)
GRAM STN SPEC: NORMAL
GRAM STN SPEC: NORMAL

## 2021-01-24 PROCEDURE — 97530 THERAPEUTIC ACTIVITIES: CPT

## 2021-01-24 PROCEDURE — G0378 HOSPITAL OBSERVATION PER HR: HCPCS

## 2021-01-24 PROCEDURE — 82962 GLUCOSE BLOOD TEST: CPT

## 2021-01-24 PROCEDURE — 94799 UNLISTED PULMONARY SVC/PX: CPT

## 2021-01-24 PROCEDURE — 25010000002 HYDROMORPHONE PER 4 MG: Performed by: PODIATRIST

## 2021-01-24 PROCEDURE — 63710000001 INSULIN LISPRO (HUMAN) PER 5 UNITS: Performed by: PODIATRIST

## 2021-01-24 RX ADMIN — ACETAMINOPHEN 500 MG: 500 TABLET, FILM COATED ORAL at 16:02

## 2021-01-24 RX ADMIN — BUDESONIDE 0.5 MG: 0.5 INHALANT RESPIRATORY (INHALATION) at 09:31

## 2021-01-24 RX ADMIN — INSULIN LISPRO 2 UNITS: 100 INJECTION, SOLUTION INTRAVENOUS; SUBCUTANEOUS at 07:30

## 2021-01-24 RX ADMIN — INSULIN LISPRO 2 UNITS: 100 INJECTION, SOLUTION INTRAVENOUS; SUBCUTANEOUS at 16:48

## 2021-01-24 RX ADMIN — PREGABALIN 150 MG: 75 CAPSULE ORAL at 09:03

## 2021-01-24 RX ADMIN — METOPROLOL TARTRATE 25 MG: 25 TABLET, FILM COATED ORAL at 09:03

## 2021-01-24 RX ADMIN — HYDROMORPHONE HYDROCHLORIDE 0.25 MG: 2 INJECTION, SOLUTION INTRAMUSCULAR; INTRAVENOUS; SUBCUTANEOUS at 00:30

## 2021-01-24 RX ADMIN — OXYCODONE HYDROCHLORIDE 15 MG: 5 TABLET ORAL at 02:35

## 2021-01-24 RX ADMIN — HYDROMORPHONE HYDROCHLORIDE 0.25 MG: 2 INJECTION, SOLUTION INTRAMUSCULAR; INTRAVENOUS; SUBCUTANEOUS at 07:31

## 2021-01-24 RX ADMIN — HYDROMORPHONE HYDROCHLORIDE 0.25 MG: 2 INJECTION, SOLUTION INTRAMUSCULAR; INTRAVENOUS; SUBCUTANEOUS at 12:58

## 2021-01-24 RX ADMIN — INSULIN LISPRO 2 UNITS: 100 INJECTION, SOLUTION INTRAVENOUS; SUBCUTANEOUS at 12:29

## 2021-01-24 RX ADMIN — OXYCODONE HYDROCHLORIDE 15 MG: 5 TABLET ORAL at 16:02

## 2021-01-24 RX ADMIN — HYDROMORPHONE HYDROCHLORIDE 0.25 MG: 2 INJECTION, SOLUTION INTRAMUSCULAR; INTRAVENOUS; SUBCUTANEOUS at 17:05

## 2021-01-24 RX ADMIN — IPRATROPIUM BROMIDE AND ALBUTEROL SULFATE 3 ML: 2.5; .5 SOLUTION RESPIRATORY (INHALATION) at 09:28

## 2021-01-25 ENCOUNTER — READMISSION MANAGEMENT (OUTPATIENT)
Dept: CALL CENTER | Facility: HOSPITAL | Age: 76
End: 2021-01-25

## 2021-01-25 NOTE — OUTREACH NOTE
Prep Survey      Responses   [facility] facility patient discharged from?  [facility]   Is LACE score < 7 ?  No   Emergency Room discharge w/ pulse ox?  No   Eligibility  Readm Mgmt   Discharge diagnosis  Wound excision with fifth metatarsal head resection, right foot,  Ischemic foot wound   Does the patient have one of the following disease processes/diagnoses(primary or secondary)?  General Surgery   Does the patient have Home health ordered?  Yes   What is the Home health agency?   [facility]   Is there a DME ordered?  Yes   What DME was ordered?  iza for knee walker, [facility]   Prep survey completed?  Yes          Lidia Medina RN

## 2021-01-26 ENCOUNTER — HOSPITAL ENCOUNTER (EMERGENCY)
Facility: HOSPITAL | Age: 76
Discharge: HOME | End: 2021-01-26
Attending: EMERGENCY MEDICINE | Admitting: EMERGENCY MEDICINE

## 2021-01-26 ENCOUNTER — APPOINTMENT (OUTPATIENT)
Dept: GENERAL RADIOLOGY | Facility: HOSPITAL | Age: 76
End: 2021-01-26

## 2021-01-26 ENCOUNTER — APPOINTMENT (OUTPATIENT)
Dept: CT IMAGING | Facility: HOSPITAL | Age: 76
End: 2021-01-26

## 2021-01-26 VITALS
DIASTOLIC BLOOD PRESSURE: 79 MMHG | BODY MASS INDEX: 37.11 KG/M2 | SYSTOLIC BLOOD PRESSURE: 163 MMHG | HEART RATE: 71 BPM | TEMPERATURE: 98 F | OXYGEN SATURATION: 97 % | HEIGHT: 73 IN | WEIGHT: 280 LBS | RESPIRATION RATE: 18 BRPM

## 2021-01-26 DIAGNOSIS — I50.9 ACUTE ON CHRONIC CONGESTIVE HEART FAILURE, UNSPECIFIED HEART FAILURE TYPE (HCC): Primary | ICD-10-CM

## 2021-01-26 LAB
ALBUMIN SERPL-MCNC: 3.4 G/DL (ref 3.5–5.2)
ALBUMIN/GLOB SERPL: 1 G/DL
ALP SERPL-CCNC: 192 U/L (ref 39–117)
ALT SERPL W P-5'-P-CCNC: 22 U/L (ref 1–41)
ANION GAP SERPL CALCULATED.3IONS-SCNC: 11 MMOL/L (ref 5–15)
APTT PPP: 25.4 SECONDS (ref 24–31)
AST SERPL-CCNC: 23 U/L (ref 1–40)
B PARAPERT DNA SPEC QL NAA+PROBE: NOT DETECTED
B PERT DNA SPEC QL NAA+PROBE: NOT DETECTED
BASOPHILS # BLD AUTO: 0.1 10*3/MM3 (ref 0–0.2)
BASOPHILS NFR BLD AUTO: 0.8 % (ref 0–1.5)
BILIRUB SERPL-MCNC: 1.3 MG/DL (ref 0–1.2)
BUN SERPL-MCNC: 13 MG/DL (ref 8–23)
BUN/CREAT SERPL: 11.5 (ref 7–25)
C PNEUM DNA NPH QL NAA+NON-PROBE: NOT DETECTED
CALCIUM SPEC-SCNC: 9 MG/DL (ref 8.6–10.5)
CHLORIDE SERPL-SCNC: 103 MMOL/L (ref 98–107)
CO2 SERPL-SCNC: 23 MMOL/L (ref 22–29)
CREAT SERPL-MCNC: 1.13 MG/DL (ref 0.76–1.27)
D DIMER PPP FEU-MCNC: 0.99 MG/L (FEU) (ref 0–0.59)
DEPRECATED RDW RBC AUTO: 48.1 FL (ref 37–54)
EOSINOPHIL # BLD AUTO: 0.2 10*3/MM3 (ref 0–0.4)
EOSINOPHIL NFR BLD AUTO: 2.7 % (ref 0.3–6.2)
ERYTHROCYTE [DISTWIDTH] IN BLOOD BY AUTOMATED COUNT: 15.7 % (ref 12.3–15.4)
FLUAV SUBTYP SPEC NAA+PROBE: NOT DETECTED
FLUBV RNA ISLT QL NAA+PROBE: NOT DETECTED
GFR SERPL CREATININE-BSD FRML MDRD: 63 ML/MIN/1.73
GLOBULIN UR ELPH-MCNC: 3.3 GM/DL
GLUCOSE SERPL-MCNC: 186 MG/DL (ref 65–99)
HADV DNA SPEC NAA+PROBE: NOT DETECTED
HCOV 229E RNA SPEC QL NAA+PROBE: NOT DETECTED
HCOV HKU1 RNA SPEC QL NAA+PROBE: NOT DETECTED
HCOV NL63 RNA SPEC QL NAA+PROBE: NOT DETECTED
HCOV OC43 RNA SPEC QL NAA+PROBE: NOT DETECTED
HCT VFR BLD AUTO: 36 % (ref 37.5–51)
HGB BLD-MCNC: 12.2 G/DL (ref 13–17.7)
HMPV RNA NPH QL NAA+NON-PROBE: NOT DETECTED
HPIV1 RNA SPEC QL NAA+PROBE: NOT DETECTED
HPIV2 RNA SPEC QL NAA+PROBE: NOT DETECTED
HPIV3 RNA NPH QL NAA+PROBE: NOT DETECTED
HPIV4 P GENE NPH QL NAA+PROBE: NOT DETECTED
INR PPP: 1.06 (ref 0.93–1.1)
LAB AP CASE REPORT: NORMAL
LYMPHOCYTES # BLD AUTO: 0.8 10*3/MM3 (ref 0.7–3.1)
LYMPHOCYTES NFR BLD AUTO: 11.5 % (ref 19.6–45.3)
M PNEUMO IGG SER IA-ACNC: NOT DETECTED
MCH RBC QN AUTO: 29.8 PG (ref 26.6–33)
MCHC RBC AUTO-ENTMCNC: 33.8 G/DL (ref 31.5–35.7)
MCV RBC AUTO: 88.2 FL (ref 79–97)
MONOCYTES # BLD AUTO: 0.7 10*3/MM3 (ref 0.1–0.9)
MONOCYTES NFR BLD AUTO: 9.7 % (ref 5–12)
NEUTROPHILS NFR BLD AUTO: 5.2 10*3/MM3 (ref 1.7–7)
NEUTROPHILS NFR BLD AUTO: 75.3 % (ref 42.7–76)
NRBC BLD AUTO-RTO: 0.1 /100 WBC (ref 0–0.2)
NT-PROBNP SERPL-MCNC: 2699 PG/ML (ref 0–900)
PATH REPORT.FINAL DX SPEC: NORMAL
PATH REPORT.GROSS SPEC: NORMAL
PLATELET # BLD AUTO: 295 10*3/MM3 (ref 140–450)
PMV BLD AUTO: 8.1 FL (ref 6–12)
POTASSIUM SERPL-SCNC: 4 MMOL/L (ref 3.5–5.2)
PROCALCITONIN SERPL-MCNC: 0.11 NG/ML (ref 0–0.25)
PROT SERPL-MCNC: 6.7 G/DL (ref 6–8.5)
PROTHROMBIN TIME: 11.6 SECONDS (ref 9.6–11.7)
RBC # BLD AUTO: 4.08 10*6/MM3 (ref 4.14–5.8)
RHINOVIRUS RNA SPEC NAA+PROBE: NOT DETECTED
RSV RNA NPH QL NAA+NON-PROBE: NOT DETECTED
SARS-COV-2 RNA NPH QL NAA+NON-PROBE: NOT DETECTED
SODIUM SERPL-SCNC: 137 MMOL/L (ref 136–145)
TROPONIN T SERPL-MCNC: <0.01 NG/ML (ref 0–0.03)
WBC # BLD AUTO: 7 10*3/MM3 (ref 3.4–10.8)

## 2021-01-26 PROCEDURE — 96375 TX/PRO/DX INJ NEW DRUG ADDON: CPT

## 2021-01-26 PROCEDURE — 71045 X-RAY EXAM CHEST 1 VIEW: CPT

## 2021-01-26 PROCEDURE — 94760 N-INVAS EAR/PLS OXIMETRY 1: CPT

## 2021-01-26 PROCEDURE — 80053 COMPREHEN METABOLIC PANEL: CPT | Performed by: EMERGENCY MEDICINE

## 2021-01-26 PROCEDURE — 99284 EMERGENCY DEPT VISIT MOD MDM: CPT

## 2021-01-26 PROCEDURE — 93005 ELECTROCARDIOGRAM TRACING: CPT | Performed by: EMERGENCY MEDICINE

## 2021-01-26 PROCEDURE — 85610 PROTHROMBIN TIME: CPT | Performed by: EMERGENCY MEDICINE

## 2021-01-26 PROCEDURE — 85025 COMPLETE CBC W/AUTO DIFF WBC: CPT | Performed by: EMERGENCY MEDICINE

## 2021-01-26 PROCEDURE — 85730 THROMBOPLASTIN TIME PARTIAL: CPT | Performed by: EMERGENCY MEDICINE

## 2021-01-26 PROCEDURE — 25010000002 FUROSEMIDE PER 20 MG: Performed by: EMERGENCY MEDICINE

## 2021-01-26 PROCEDURE — 85379 FIBRIN DEGRADATION QUANT: CPT | Performed by: EMERGENCY MEDICINE

## 2021-01-26 PROCEDURE — 87040 BLOOD CULTURE FOR BACTERIA: CPT | Performed by: EMERGENCY MEDICINE

## 2021-01-26 PROCEDURE — 25010000002 METHYLPREDNISOLONE PER 125 MG: Performed by: EMERGENCY MEDICINE

## 2021-01-26 PROCEDURE — 71275 CT ANGIOGRAPHY CHEST: CPT

## 2021-01-26 PROCEDURE — 84145 PROCALCITONIN (PCT): CPT | Performed by: EMERGENCY MEDICINE

## 2021-01-26 PROCEDURE — 94799 UNLISTED PULMONARY SVC/PX: CPT

## 2021-01-26 PROCEDURE — 94640 AIRWAY INHALATION TREATMENT: CPT

## 2021-01-26 PROCEDURE — 0 IOPAMIDOL PER 1 ML: Performed by: EMERGENCY MEDICINE

## 2021-01-26 PROCEDURE — 83880 ASSAY OF NATRIURETIC PEPTIDE: CPT | Performed by: EMERGENCY MEDICINE

## 2021-01-26 PROCEDURE — 84484 ASSAY OF TROPONIN QUANT: CPT | Performed by: EMERGENCY MEDICINE

## 2021-01-26 PROCEDURE — 96374 THER/PROPH/DIAG INJ IV PUSH: CPT

## 2021-01-26 PROCEDURE — 0202U NFCT DS 22 TRGT SARS-COV-2: CPT | Performed by: EMERGENCY MEDICINE

## 2021-01-26 PROCEDURE — 94664 DEMO&/EVAL PT USE INHALER: CPT

## 2021-01-26 RX ORDER — ALBUTEROL SULFATE 90 UG/1
2 AEROSOL, METERED RESPIRATORY (INHALATION) ONCE
Status: COMPLETED | OUTPATIENT
Start: 2021-01-26 | End: 2021-01-26

## 2021-01-26 RX ORDER — FUROSEMIDE 10 MG/ML
40 INJECTION INTRAMUSCULAR; INTRAVENOUS ONCE
Status: COMPLETED | OUTPATIENT
Start: 2021-01-26 | End: 2021-01-26

## 2021-01-26 RX ORDER — METHYLPREDNISOLONE SODIUM SUCCINATE 125 MG/2ML
125 INJECTION, POWDER, LYOPHILIZED, FOR SOLUTION INTRAMUSCULAR; INTRAVENOUS ONCE
Status: COMPLETED | OUTPATIENT
Start: 2021-01-26 | End: 2021-01-26

## 2021-01-26 RX ORDER — ALBUTEROL SULFATE 2.5 MG/3ML
2.5 SOLUTION RESPIRATORY (INHALATION) ONCE
Status: COMPLETED | OUTPATIENT
Start: 2021-01-26 | End: 2021-01-26

## 2021-01-26 RX ORDER — OXYCODONE HYDROCHLORIDE 5 MG/1
10 TABLET ORAL ONCE
Status: COMPLETED | OUTPATIENT
Start: 2021-01-26 | End: 2021-01-26

## 2021-01-26 RX ADMIN — ALBUTEROL SULFATE 2 PUFF: 108 AEROSOL, METERED RESPIRATORY (INHALATION) at 05:11

## 2021-01-26 RX ADMIN — METHYLPREDNISOLONE SODIUM SUCCINATE 125 MG: 125 INJECTION, POWDER, FOR SOLUTION INTRAMUSCULAR; INTRAVENOUS at 06:11

## 2021-01-26 RX ADMIN — IOPAMIDOL 100 ML: 755 INJECTION, SOLUTION INTRAVENOUS at 06:07

## 2021-01-26 RX ADMIN — ALBUTEROL SULFATE 2.5 MG: 2.5 SOLUTION RESPIRATORY (INHALATION) at 06:38

## 2021-01-26 RX ADMIN — FUROSEMIDE 40 MG: 10 INJECTION, SOLUTION INTRAMUSCULAR; INTRAVENOUS at 07:14

## 2021-01-26 RX ADMIN — OXYCODONE 10 MG: 5 TABLET ORAL at 06:11

## 2021-01-27 ENCOUNTER — READMISSION MANAGEMENT (OUTPATIENT)
Dept: CALL CENTER | Facility: HOSPITAL | Age: 76
End: 2021-01-27

## 2021-01-27 LAB — BACTERIA SPEC ANAEROBE CULT: NORMAL

## 2021-01-27 NOTE — OUTREACH NOTE
General Surgery Week 1 Survey      Responses   [facility] patient discharged from?  [facility]   Does the patient have one of the following disease processes/diagnoses(primary or secondary)?  General Surgery   Week 1 attempt successful?  Yes   Call start time  0833   Call end time  0837   Discharge diagnosis  Wound excision with fifth metatarsal head resection, right foot,  Ischemic foot wound   List who call center can speak with  spouse- Meghan   Person spoke with today (if not patient) and relationship  spouse- Meghan   Meds reviewed with patient/caregiver?  Yes   Is the patient having any side effects they believe may be caused by any medication additions or changes?  No   Does the patient have all medications related to this admission filled (includes all antibiotics, pain medications, etc.)  Yes   Is the patient taking all medications as directed (includes completed medication regime)?  Yes   Does the patient have a follow up appointment scheduled with their surgeon?  Yes   Has the patient kept scheduled appointments due by today?  N/A   Has home health visited the patient within 72 hours of discharge?  N/A   Psychosocial issues?  No   Did the patient receive a copy of their discharge instructions?  Yes   Nursing interventions  Reviewed instructions with patient   What is the patient's perception of their health status since discharge?  Improving   Is the patient /caregiver able to teach back basic post-op care?  Continue use of incentive spirometry at least 1 week post discharge, Practice 'cough and deep breath', Drive as instructed by MD in discharge instructions, Take showers only when approved by MD-sponge bathe until then, No tub bath, swimming, or hot tub until instructed by MD, Keep incision areas clean,dry and protected, Do not remove steri-strips, Lifting as instructed by MD in discharge instructions   Is the patient/caregiver able to teach back signs and symptoms of incisional infection?  Increased  redness, swelling or pain at the incisonal site, Increased drainage or bleeding, Incisional warmth, Pus or odor from incision, Fever   Is the patient/caregiver able to teach back steps to recovery at home?  Set small, achievable goals for return to baseline health, Rest and rebuild strength, gradually increase activity, Weigh daily, Practice good oral hygiene, Eat a well-balance diet, Make a list of questions for surgeon's appointment   Is the patient/caregiver able to teach back the hierarchy of who to call/visit for symptoms/problems? PCP, Specialist, Home health nurse, Urgent Care, ED, 911  Yes   Additional teach back comments  Pt's spouse states he is doing much better. Pt did go to ED yesterday for sob but was released. Spouse is performing daily dressing changes.   Week 1 call completed?  Yes          Judith Caro RN

## 2021-01-29 LAB — QT INTERVAL: 433 MS

## 2021-01-31 LAB — BACTERIA SPEC AEROBE CULT: NORMAL

## 2021-02-05 ENCOUNTER — READMISSION MANAGEMENT (OUTPATIENT)
Dept: CALL CENTER | Facility: HOSPITAL | Age: 76
End: 2021-02-05

## 2021-02-05 NOTE — OUTREACH NOTE
General Surgery Week 2 Survey      Responses   [facility] patient discharged from?  [facility]   Does the patient have one of the following disease processes/diagnoses(primary or secondary)?  General Surgery   Week 2 attempt successful?  Yes   Call start time  0826   Call end time  0833   Discharge diagnosis  Wound excision with fifth metatarsal head resection, right foot,  Ischemic foot wound   Person spoke with today (if not patient) and relationship  spouse- Meghan   Meds reviewed with patient/caregiver?  Yes   Is the patient taking all medications as directed (includes completed medication regime)?  Yes   Has the patient kept scheduled appointments due by today?  Yes   What is the Home health agency?   No Home Health, wife provides care.   What is the patient's perception of their health status since discharge?  New symptoms unrelated to diagnosis [Wife says COPD has flared up. Had an ER viist and given a shot. Will discuss further plan with MD.]   Week 2 call completed?  Yes          Loli Torres RN

## 2021-02-12 ENCOUNTER — READMISSION MANAGEMENT (OUTPATIENT)
Dept: CALL CENTER | Facility: HOSPITAL | Age: 76
End: 2021-02-12

## 2021-02-12 NOTE — OUTREACH NOTE
General Surgery Week 3 Survey      Responses   [facility] patient discharged from?  [facility]   Does the patient have one of the following disease processes/diagnoses(primary or secondary)?  General Surgery   Week 3 attempt successful?  No   Unsuccessful attempts  Attempt 1          Lawrence Mckeon RN

## 2021-02-14 ENCOUNTER — READMISSION MANAGEMENT (OUTPATIENT)
Dept: CALL CENTER | Facility: HOSPITAL | Age: 76
End: 2021-02-14

## 2021-02-14 NOTE — OUTREACH NOTE
General Surgery Week 3 Survey      Responses   [facility] patient discharged from?  [facility]   Does the patient have one of the following disease processes/diagnoses(primary or secondary)?  General Surgery   Week 3 attempt successful?  Yes   Call start time  0924   Call end time  0924   Discharge diagnosis  Wound excision with fifth metatarsal head resection, right foot,  Ischemic foot wound   Is patient permission given to speak with other caregiver?  Yes   List who call center can speak with  spouse- Meghan   Person spoke with today (if not patient) and relationship  spouse- Meghan   Meds reviewed with patient/caregiver?  Yes   Is the patient having any side effects they believe may be caused by any medication additions or changes?  No   Does the patient have all medications related to this admission filled (includes all antibiotics, pain medications, etc.)  Yes   Is the patient taking all medications as directed (includes completed medication regime)?  Yes   Does the patient have a follow up appointment scheduled with their surgeon?  Yes   Has the patient kept scheduled appointments due by today?  Yes   Psychosocial issues?  No   Did the patient receive a copy of their discharge instructions?  Yes   Nursing interventions  Reviewed instructions with patient, Educated on MyChart   What is the patient's perception of their health status since discharge?  Improving   Nursing interventions  Nurse provided patient education   Is the patient /caregiver able to teach back basic post-op care?  Continue use of incentive spirometry at least 1 week post discharge, Practice 'cough and deep breath'   Is the patient/caregiver able to teach back signs and symptoms of incisional infection?  Increased redness, swelling or pain at the incisonal site, Increased drainage or bleeding   Is the patient/caregiver able to teach back steps to recovery at home?  Set small, achievable goals for return to baseline health, Rest and rebuild  strength, gradually increase activity   If the patient is a current smoker, are they able to teach back resources for cessation?  Not a smoker   Is the patient/caregiver able to teach back the hierarchy of who to call/visit for symptoms/problems? PCP, Specialist, Home health nurse, Urgent Care, ED, 911  Yes   Week 3 call completed?  Yes          Rajani Medina RN

## 2021-02-22 ENCOUNTER — READMISSION MANAGEMENT (OUTPATIENT)
Dept: CALL CENTER | Facility: HOSPITAL | Age: 76
End: 2021-02-22

## 2021-02-22 NOTE — OUTREACH NOTE
General Surgery Week 4 Survey      Responses   [facility] patient discharged from?  [facility]   Does the patient have one of the following disease processes/diagnoses(primary or secondary)?  General Surgery   Week 4 attempt successful?  Yes   Call start time  1734   Call end time  1735   Discharge diagnosis  Wound excision with fifth metatarsal head resection, right foot,  Ischemic foot wound   Is patient permission given to speak with other caregiver?  Yes   List who call center can speak with  spouse- Meghan   Person spoke with today (if not patient) and relationship  spouse- Meghan   Is the patient taking all medications as directed (includes completed medication regime)?  Yes   Has the patient kept scheduled appointments due by today?  Yes   Is the patient still receiving Home Health Services?  N/A   Psychosocial issues?  No   What is the patient's perception of their health status since discharge?  Improving   Nursing interventions  Nurse provided patient education   Is the patient/caregiver able to teach back steps to recovery at home?  Set small, achievable goals for return to baseline health, Rest and rebuild strength, gradually increase activity   If the patient is a current smoker, are they able to teach back resources for cessation?  Not a smoker   Is the patient/caregiver able to teach back the hierarchy of who to call/visit for symptoms/problems? PCP, Specialist, Home health nurse, Urgent Care, ED, 911  Yes   Week 4 call completed?  Yes   Would the patient like one additional call?  No   Graduated  Yes   Is the patient interested in additional calls from an ambulatory ?  NOTE:  applies to high risk patients requiring additional follow-up.  No   Did the patient feel the follow up calls were helpful during their recovery period?  Yes   Was the number of calls appropriate?  Yes          Rajani Medina RN

## 2021-04-05 ENCOUNTER — HOSPITAL ENCOUNTER (INPATIENT)
Facility: HOSPITAL | Age: 76
LOS: 4 days | Discharge: HOME HEALTH | End: 2021-04-09
Attending: FAMILY MEDICINE | Admitting: PODIATRIST

## 2021-04-05 ENCOUNTER — APPOINTMENT (OUTPATIENT)
Dept: GENERAL RADIOLOGY | Facility: HOSPITAL | Age: 76
End: 2021-04-05

## 2021-04-05 DIAGNOSIS — E11.621 DIABETIC FOOT ULCER (HCC): ICD-10-CM

## 2021-04-05 DIAGNOSIS — L97.509 DIABETIC FOOT ULCER (HCC): ICD-10-CM

## 2021-04-05 DIAGNOSIS — L97.516 ULCER OF RIGHT FOOT WITH BONE INVOLVEMENT WITHOUT EVIDENCE OF NECROSIS (HCC): Primary | ICD-10-CM

## 2021-04-05 PROBLEM — K43.6 OBSTRUCTED VENTRAL HERNIA: Status: ACTIVE | Noted: 2020-10-25

## 2021-04-05 PROBLEM — M20.30 ACQUIRED HALLUX MALLEUS: Status: ACTIVE | Noted: 2018-09-06

## 2021-04-05 PROBLEM — I25.10 CORONARY ATHEROSCLEROSIS: Status: ACTIVE | Noted: 2019-12-03

## 2021-04-05 PROBLEM — E13.51 PERIPHERAL VASCULAR DISEASE DUE TO SECONDARY DIABETES MELLITUS (HCC): Status: ACTIVE | Noted: 2020-10-26

## 2021-04-05 PROBLEM — K44.9 HIATAL HERNIA: Status: ACTIVE | Noted: 2021-03-01

## 2021-04-05 PROBLEM — L97.519 RIGHT FOOT ULCER: Status: ACTIVE | Noted: 2021-04-05

## 2021-04-05 PROBLEM — I10 BENIGN HYPERTENSION: Status: ACTIVE | Noted: 2020-10-26

## 2021-04-05 PROBLEM — E11.9 TYPE 2 DIABETES MELLITUS (HCC): Status: ACTIVE | Noted: 2020-10-26

## 2021-04-05 PROBLEM — M19.90 ARTHRITIS: Status: ACTIVE | Noted: 2021-03-01

## 2021-04-05 PROBLEM — J44.9 CHRONIC OBSTRUCTIVE BRONCHITIS (HCC): Status: ACTIVE | Noted: 2020-10-26

## 2021-04-05 PROBLEM — I50.9 CONGESTIVE HEART FAILURE (HCC): Status: ACTIVE | Noted: 2021-03-01

## 2021-04-05 LAB
ALBUMIN SERPL-MCNC: 3.2 G/DL (ref 3.5–5.2)
ALBUMIN/GLOB SERPL: 1.2 G/DL
ALP SERPL-CCNC: 156 U/L (ref 39–117)
ALT SERPL W P-5'-P-CCNC: 12 U/L (ref 1–41)
ANION GAP SERPL CALCULATED.3IONS-SCNC: 9 MMOL/L (ref 5–15)
AST SERPL-CCNC: 14 U/L (ref 1–40)
BASOPHILS # BLD AUTO: 0.1 10*3/MM3 (ref 0–0.2)
BASOPHILS NFR BLD AUTO: 0.9 % (ref 0–1.5)
BILIRUB SERPL-MCNC: 0.4 MG/DL (ref 0–1.2)
BUN SERPL-MCNC: 34 MG/DL (ref 8–23)
BUN/CREAT SERPL: 26 (ref 7–25)
CALCIUM SPEC-SCNC: 8.8 MG/DL (ref 8.6–10.5)
CHLORIDE SERPL-SCNC: 99 MMOL/L (ref 98–107)
CO2 SERPL-SCNC: 24 MMOL/L (ref 22–29)
CREAT SERPL-MCNC: 1.31 MG/DL (ref 0.76–1.27)
DEPRECATED RDW RBC AUTO: 50.8 FL (ref 37–54)
EOSINOPHIL # BLD AUTO: 0.2 10*3/MM3 (ref 0–0.4)
EOSINOPHIL NFR BLD AUTO: 1.9 % (ref 0.3–6.2)
ERYTHROCYTE [DISTWIDTH] IN BLOOD BY AUTOMATED COUNT: 16.2 % (ref 12.3–15.4)
GFR SERPL CREATININE-BSD FRML MDRD: 53 ML/MIN/1.73
GLOBULIN UR ELPH-MCNC: 2.7 GM/DL
GLUCOSE BLDC GLUCOMTR-MCNC: 285 MG/DL (ref 70–105)
GLUCOSE BLDC GLUCOMTR-MCNC: 289 MG/DL (ref 70–105)
GLUCOSE SERPL-MCNC: 286 MG/DL (ref 65–99)
HCT VFR BLD AUTO: 40.7 % (ref 37.5–51)
HGB BLD-MCNC: 13.4 G/DL (ref 13–17.7)
LYMPHOCYTES # BLD AUTO: 1.6 10*3/MM3 (ref 0.7–3.1)
LYMPHOCYTES NFR BLD AUTO: 17.3 % (ref 19.6–45.3)
MCH RBC QN AUTO: 30.4 PG (ref 26.6–33)
MCHC RBC AUTO-ENTMCNC: 32.9 G/DL (ref 31.5–35.7)
MCV RBC AUTO: 92.3 FL (ref 79–97)
MONOCYTES # BLD AUTO: 0.7 10*3/MM3 (ref 0.1–0.9)
MONOCYTES NFR BLD AUTO: 7.5 % (ref 5–12)
NEUTROPHILS NFR BLD AUTO: 6.6 10*3/MM3 (ref 1.7–7)
NEUTROPHILS NFR BLD AUTO: 72.4 % (ref 42.7–76)
NRBC BLD AUTO-RTO: 0.1 /100 WBC (ref 0–0.2)
PLATELET # BLD AUTO: 271 10*3/MM3 (ref 140–450)
PMV BLD AUTO: 8.7 FL (ref 6–12)
POTASSIUM SERPL-SCNC: 5.5 MMOL/L (ref 3.5–5.2)
PROT SERPL-MCNC: 5.9 G/DL (ref 6–8.5)
RBC # BLD AUTO: 4.41 10*6/MM3 (ref 4.14–5.8)
SODIUM SERPL-SCNC: 132 MMOL/L (ref 136–145)
WBC # BLD AUTO: 9.1 10*3/MM3 (ref 3.4–10.8)

## 2021-04-05 PROCEDURE — 73630 X-RAY EXAM OF FOOT: CPT

## 2021-04-05 PROCEDURE — G0378 HOSPITAL OBSERVATION PER HR: HCPCS

## 2021-04-05 PROCEDURE — 82962 GLUCOSE BLOOD TEST: CPT

## 2021-04-05 PROCEDURE — 80053 COMPREHEN METABOLIC PANEL: CPT | Performed by: FAMILY MEDICINE

## 2021-04-05 PROCEDURE — 25010000002 CEFTRIAXONE PER 250 MG: Performed by: FAMILY MEDICINE

## 2021-04-05 PROCEDURE — 94799 UNLISTED PULMONARY SVC/PX: CPT

## 2021-04-05 PROCEDURE — 87040 BLOOD CULTURE FOR BACTERIA: CPT | Performed by: FAMILY MEDICINE

## 2021-04-05 PROCEDURE — 85025 COMPLETE CBC W/AUTO DIFF WBC: CPT | Performed by: FAMILY MEDICINE

## 2021-04-05 PROCEDURE — 94640 AIRWAY INHALATION TREATMENT: CPT

## 2021-04-05 RX ORDER — DEXTROSE MONOHYDRATE 25 G/50ML
25 INJECTION, SOLUTION INTRAVENOUS
Status: DISCONTINUED | OUTPATIENT
Start: 2021-04-05 | End: 2021-04-09 | Stop reason: HOSPADM

## 2021-04-05 RX ORDER — INSULIN LISPRO 100 [IU]/ML
0-9 INJECTION, SOLUTION INTRAVENOUS; SUBCUTANEOUS
Status: DISCONTINUED | OUTPATIENT
Start: 2021-04-06 | End: 2021-04-09 | Stop reason: HOSPADM

## 2021-04-05 RX ORDER — BUDESONIDE 0.5 MG/2ML
0.5 INHALANT ORAL
Status: DISCONTINUED | OUTPATIENT
Start: 2021-04-05 | End: 2021-04-09 | Stop reason: HOSPADM

## 2021-04-05 RX ORDER — OXYCODONE HYDROCHLORIDE 5 MG/1
10 TABLET ORAL EVERY 6 HOURS PRN
Status: DISCONTINUED | OUTPATIENT
Start: 2021-04-05 | End: 2021-04-09 | Stop reason: HOSPADM

## 2021-04-05 RX ORDER — TRAZODONE HYDROCHLORIDE 100 MG/1
100 TABLET ORAL NIGHTLY
Status: DISCONTINUED | OUTPATIENT
Start: 2021-04-05 | End: 2021-04-09 | Stop reason: HOSPADM

## 2021-04-05 RX ORDER — FUROSEMIDE 20 MG/1
20 TABLET ORAL DAILY
Status: DISCONTINUED | OUTPATIENT
Start: 2021-04-06 | End: 2021-04-09 | Stop reason: HOSPADM

## 2021-04-05 RX ORDER — IPRATROPIUM BROMIDE AND ALBUTEROL SULFATE 2.5; .5 MG/3ML; MG/3ML
3 SOLUTION RESPIRATORY (INHALATION)
Status: DISCONTINUED | OUTPATIENT
Start: 2021-04-05 | End: 2021-04-09 | Stop reason: HOSPADM

## 2021-04-05 RX ORDER — LOSARTAN POTASSIUM 50 MG/1
50 TABLET ORAL EVERY MORNING
Status: DISCONTINUED | OUTPATIENT
Start: 2021-04-06 | End: 2021-04-09 | Stop reason: HOSPADM

## 2021-04-05 RX ORDER — ATORVASTATIN CALCIUM 40 MG/1
40 TABLET, FILM COATED ORAL NIGHTLY
Status: DISCONTINUED | OUTPATIENT
Start: 2021-04-05 | End: 2021-04-09 | Stop reason: HOSPADM

## 2021-04-05 RX ORDER — METOLAZONE 5 MG/1
5 TABLET ORAL NIGHTLY
Status: DISCONTINUED | OUTPATIENT
Start: 2021-04-06 | End: 2021-04-09 | Stop reason: HOSPADM

## 2021-04-05 RX ORDER — PREGABALIN 75 MG/1
150 CAPSULE ORAL 2 TIMES DAILY
Status: DISCONTINUED | OUTPATIENT
Start: 2021-04-05 | End: 2021-04-09 | Stop reason: HOSPADM

## 2021-04-05 RX ORDER — NICOTINE POLACRILEX 4 MG
15 LOZENGE BUCCAL
Status: DISCONTINUED | OUTPATIENT
Start: 2021-04-05 | End: 2021-04-09 | Stop reason: HOSPADM

## 2021-04-05 RX ORDER — OXYCODONE HYDROCHLORIDE 5 MG/1
10 TABLET ORAL EVERY 4 HOURS PRN
Refills: 0 | Status: DISCONTINUED | OUTPATIENT
Start: 2021-04-05 | End: 2021-04-09 | Stop reason: HOSPADM

## 2021-04-05 RX ORDER — INSULIN LISPRO 100 [IU]/ML
0-9 INJECTION, SOLUTION INTRAVENOUS; SUBCUTANEOUS AS NEEDED
Status: DISCONTINUED | OUTPATIENT
Start: 2021-04-05 | End: 2021-04-09 | Stop reason: HOSPADM

## 2021-04-05 RX ORDER — MIRTAZAPINE 15 MG/1
30 TABLET, FILM COATED ORAL NIGHTLY
Status: DISCONTINUED | OUTPATIENT
Start: 2021-04-05 | End: 2021-04-09 | Stop reason: HOSPADM

## 2021-04-05 RX ORDER — POTASSIUM CHLORIDE 750 MG/1
10 TABLET, FILM COATED, EXTENDED RELEASE ORAL 2 TIMES DAILY
Status: DISCONTINUED | OUTPATIENT
Start: 2021-04-05 | End: 2021-04-09 | Stop reason: HOSPADM

## 2021-04-05 RX ADMIN — ATORVASTATIN CALCIUM 40 MG: 40 TABLET, FILM COATED ORAL at 19:31

## 2021-04-05 RX ADMIN — CEFTRIAXONE SODIUM 1 G: 1 INJECTION, POWDER, FOR SOLUTION INTRAMUSCULAR; INTRAVENOUS at 20:18

## 2021-04-05 RX ADMIN — TRAZODONE HYDROCHLORIDE 100 MG: 100 TABLET ORAL at 19:31

## 2021-04-05 RX ADMIN — IPRATROPIUM BROMIDE AND ALBUTEROL SULFATE 3 ML: 2.5; .5 SOLUTION RESPIRATORY (INHALATION) at 19:50

## 2021-04-05 RX ADMIN — MIRTAZAPINE 30 MG: 15 TABLET, FILM COATED ORAL at 19:31

## 2021-04-05 RX ADMIN — OXYCODONE 10 MG: 5 TABLET ORAL at 19:30

## 2021-04-05 RX ADMIN — BUDESONIDE 0.5 MG: 0.5 SUSPENSION RESPIRATORY (INHALATION) at 19:50

## 2021-04-05 RX ADMIN — METOPROLOL TARTRATE 25 MG: 25 TABLET, FILM COATED ORAL at 19:31

## 2021-04-05 RX ADMIN — PREGABALIN 150 MG: 75 CAPSULE ORAL at 19:31

## 2021-04-05 NOTE — CONSULTS
Foot & Ankle Surgery Consult Note    Patient Care Team:  Yusuf Jones MD as PCP - General  [facility]ing Physician (Cardiology)  Deam, Moise Kathleen MD as Consulting Physician (Cardiac Electrophysiology)      Subjective .     History of present illness:  Ro Nathan is a 75 y.o. male with a past medical history significant for T2DM, COPD, HTN, ARTI who presented to [facility] on 4/5/2021 with recurrent right foot wound. Patient is well-known to my service. He has undergone a previous wound debridement, bone biopsy, and application of skin graft. Bone biopsy was negative but his skin graft failed.  He presented to my office today for routine follow-up status post right 5th metatarsal head resection which was done on 1/22/21. He presented today with worsening lateral foot wound. At his previous visit 1 week ago we attempted primary closure of his wound in the office. Today the suture had failed and his wound appeared larger. He denies any constitutional signs of infection. He states his pain is well-controlled. He denies current or recent nausea, fever, vomiting, chills. He related that he has been compliant with keeping his wound clean and dry. He denies submerging his foot in any water. Overall, he is in good spirits but is frustrated that his foot has not healed.    Review of Systems:    Review of Systems - Negative except for those mentioned in the HPI        History  Past Medical History:   Diagnosis Date   • Alcoholic cirrhosis of liver with ascites (CMS/[facility]) 10/26/2020   • Benign hypertension with CKD (chronic kidney disease) stage III (CMS/[facility]) 10/26/2020   • Bruises easily    • CHF (congestive heart failure) (CMS/HCC)    • Chronic bronchitis with COPD (chronic obstructive pulmonary disease) (CMS/[facility]) 10/26/2020   • Chronic respiratory failure with hypoxia (CMS/[facility]) 10/26/2020   • Congenital heart defect    • Difficulty attaining erection    • Hypertension    •  Low back pain    • Pacemaker    • Peripheral vascular disease due to secondary diabetes (CMS/[facility]) 10/26/2020   • Poor circulation    • Prostate cancer (CMS/[facility])    • Shortness of breath    • Sleep apnea     using CPAP    • Stage 3a chronic kidney disease (CMS/[facility]) 10/26/2020   • Stented coronary artery 12/05/2019    MICHAEL to LAD   • Type 2 diabetes, controlled, with neuropathy (CMS/[facility]) 10/26/2020    no meds   • Type 2 diabetes, controlled, with neuropathy (CMS/[facility]) 10/26/2020     Past Surgical History:   Procedure Laterality Date   • ABDOMINAL SURGERY     • APPENDECTOMY     • BONE CURETTAGE Right 1/22/2021    Procedure: Wound excision with fifth metatarsal head resection, right foot.;  Surgeon: Josef Egan DPM;  Location: [facility] MAIN OR;  Service: Podiatry;  Laterality: Right;   • BONE INCISION AND DRAINAGE Right 10/5/2020    Procedure: Incision and drainage with debridement of all nonviable soft tissue and bone with bone biopsy, right foot.;  Surgeon: Josef Eagn DPM;  Location: [facility] MAIN OR;  Service: Podiatry;  Laterality: Right;   • CARDIAC CATHETERIZATION N/A 12/5/2019    Procedure: Left Heart Cath;  Surgeon: Mirza Munson MD;  Location: [facility] CATH INVASIVE LOCATION;  Service: Cardiology   • CARDIAC CATHETERIZATION N/A 12/5/2019    Procedure: Stent MICHAEL coronary;  Surgeon: Mirza Munson MD;  Location: [facility] CATH INVASIVE LOCATION;  Service: Cardiology   • CHOLECYSTECTOMY     • ELBOW PROCEDURE      left   • FOOT SURGERY      right foot   • HERNIA REPAIR     • INTERVENTIONAL RADIOLOGY PROCEDURE N/A 12/5/2019    Procedure: Intravascular Ultrasound;  Surgeon: Mirza Munson MD;  Location: [facility] CATH INVASIVE LOCATION;  Service: Cardiology   • PACEMAKER IMPLANTATION     • WV RT/LT HEART CATHETERS N/A 12/5/2019    Procedure: Percutaneous Coronary Intervention;  Surgeon: Mirza Munson MD;  Location: [facility] CATH INVASIVE LOCATION;  Service: Cardiology    • WOUND DEBRIDEMENT Right 10/29/2020    Procedure: Preparation and debridement of foot wound with application of Integra wound graft, right foot.;  Surgeon: Josef Egan DPM;  Location: [facility] MAIN OR;  Service: Podiatry;  Laterality: Right;     Family History   Problem Relation Age of Onset   • Alzheimer's disease Mother    • GI problems Father    • Heart disease Father       Social History     Tobacco Use   • Smoking status: Former Smoker     Years: 15.00     Types: Cigarettes     Start date: 1966     Quit date: 5/10/2020     Years since quittin.9   • Smokeless tobacco: Never Used   Vaping Use   • Vaping Use: Never used   Substance Use Topics   • Alcohol use: Yes     Alcohol/week: 4.0 standard drinks     Types: 4 Shots of liquor per week     Comment: maybe 4 shots per month   • Drug use: No     Medications Prior to Admission   Medication Sig Dispense Refill Last Dose   • atorvastatin (LIPITOR) 40 MG tablet Take 1 tablet by mouth Every Night. 30 tablet 1 2021 at Unknown time   • budesonide (PULMICORT) 0.5 MG/2ML nebulizer solution    2021 at Unknown time   • furosemide (LASIX) 20 MG tablet Take 20 mg by mouth Daily. Do not take dos   2021 at Unknown time   • ipratropium-albuterol (DUO-NEB) 0.5-2.5 mg/3 ml nebulizer    2021 at Unknown time   • losartan (COZAAR) 50 MG tablet Take 50 mg by mouth Every Morning. Do not take 24 hours prior to surgery.  LD 1-- before 1245   2021 at Unknown time   • Lyrica 150 MG capsule Take 1 capsule by mouth 2 (Two) Times a Day. 60 capsule 2 2021 at Unknown time   • metOLazone (ZAROXOLYN) 5 MG tablet Take 5 mg by mouth Every Night.   2021 at Unknown time   • metoprolol tartrate (LOPRESSOR) 25 MG tablet Take 1 tablet by mouth Every 12 (Twelve) Hours. (Patient taking differently: Take 50 mg by mouth Every 12 (Twelve) Hours. Take dos) 60 tablet 1 2021 at Unknown time   • mirtazapine (REMERON) 30 MG tablet Take 30 mg by mouth Every Night.  "  4/5/2021 at Unknown time   • oxyCODONE-acetaminophen (Percocet)  MG per tablet Take 1 tablet by mouth Every 6 (Six) Hours As Needed for Moderate Pain . 120 tablet 0 4/5/2021 at Unknown time   • Perforomist 20 MCG/2ML nebulizer solution    4/5/2021 at Unknown time   • potassium chloride (K-DUR) 10 MEQ CR tablet Take 10 mEq by mouth 2 (Two) Times a Day. Take dos   4/5/2021 at Unknown time   • prasugrel (EFFIENT) 10 MG tablet TAKE ONE TABLET BY MOUTH DAILY 30 tablet 0 4/5/2021 at Unknown time   • traZODone (DESYREL) 100 MG tablet Take 100 mg by mouth Every Night.   4/4/2021 at Unknown time   • VENTOLIN  (90 Base) MCG/ACT inhaler Inhale 2 puffs Every 6 (Six) Hours As Needed for Wheezing or Shortness of Air. Use or bring dos   4/5/2021 at Unknown time      Haloperidol, Morphine, and Pantoprazole    Scheduled Meds:atorvastatin, 40 mg, Oral, Nightly  budesonide, 0.5 mg, Nebulization, BID - RT  cefTRIAXone, 1 g, Intravenous, Q24H  [START ON 4/6/2021] furosemide, 20 mg, Oral, Daily  [START ON 4/6/2021] insulin lispro, 0-9 Units, Subcutaneous, TID AC  ipratropium-albuterol, 3 mL, Nebulization, 4x Daily - RT  [START ON 4/6/2021] losartan, 50 mg, Oral, QAM  [START ON 4/6/2021] metOLazone, 5 mg, Oral, Nightly  metoprolol tartrate, 25 mg, Oral, Q12H  mirtazapine, 30 mg, Oral, Nightly  potassium chloride, 10 mEq, Oral, BID  pregabalin, 150 mg, Oral, BID  traZODone, 100 mg, Oral, Nightly      Continuous Infusions:   PRN Meds:.dextrose  •  dextrose  •  glucagon (human recombinant)  •  [START ON 4/6/2021] insulin lispro **AND** insulin lispro  •  oxyCODONE  •  oxyCODONE    Objective     Vital Signs   Vitals:    04/05/21 1427 04/05/21 1743   BP: 144/77 132/78   BP Location: Left arm Right arm   Patient Position: Lying Sitting   Pulse: 72 75   Resp: 18 16   Temp: 98.4 °F (36.9 °C) 97.7 °F (36.5 °C)   TempSrc: Oral Oral   SpO2: 95% 97%   Weight: 127 kg (280 lb)    Height: 182.9 cm (72\")          Physical Exam:   Derm: " There is a healed cicatrix noted to the lateral aspect of the foot at the level of the 5th metatarsal head. This cicatrix measures approximately 3 cm in length. There I evidence of a wound to the proximal end of this cicatrix measuring 0.5 x 0.5 X 0.4 cm to the level of capsule/bone. There are no signs of active infection appreciated. There is no increased erythema, drainage, malodor, purulence appreciated. The wound does probe deep to capsule or bone. Bone is not visualized within the wound. There does appear to be undermining of the wound circumferentially. There is no ascending lymphangitis appreciated.    Vasc: DP and PT pulses are faintly palpable. Capillary refill time is brisk to all digits. There is no increased edema appreciated.    MSK: Non-reducible dorsal flexion contractures noted to all lesser digits at the level of the PIPJs. Manual muscle strength tested and noted to be 5/5 in all groups involved. Range of motion about his ankle and all remaining joints of the foot is smooth smooth and pain free. No pain on palpation noted to his wound site.   Neuro: Epicritic sensation remains diminished to the level of the midfoot.    Results Review:   I reviewed the patient's new clinical and imaging results.    Lab Results (last 24 hours)     ** No results found for the last 24 hours. **          Imaging Results (Last 24 Hours)     Procedure Component Value Units Date/Time    XR Foot 3+ View Right [[id number]] Collected: 04/05/21 1655     Updated: 04/05/21 1658    Narrative:      DATE OF EXAM:  4/5/2021 4:50 PM     PROCEDURE:  XR FOOT 3+ VW RIGHT-     INDICATIONS:  Recurrent foot wound lateral 5th metatarsal     COMPARISON:  Foot radiograph 01/22/2021     TECHNIQUE:   A minimum of three routine standard radiographic views were obtained of  the right foot.     FINDINGS:  Resection of the head of the fifth metatarsal is again seen. There is  bandage material present. There is flexion of the second through  fifth  toes. There is no osseous lucencies sclerosis to suggest osteoarthritis  by radiograph. There is mild joint space narrowing first  metatarsophalangeal joint. There is mild to moderate joint space  narrowing of the tarsometatarsal joints. There is mild talonavicular  joint space narrowing. There is a small plantar calcaneal spur. There is  an enthesophyte at the Achilles tendon insertion site. No acute  fractures or dislocations.        Impression:      No radiographic findings of osteomyelitis. MRI exam is more sensitive to  evaluate for ostomy myelitis.  Status post resection of the head of the fifth metatarsal.  Osteoarthritis as described.     Electronically Signed By-Antoinette August MD On:4/5/2021 4:56 PM  This report was finalized on [id number] by  Antoinette August MD.            Assessment/Plan     No notes have been filed under this hospital service.  Service: Hospitalist       Diabetic foot ulcer to the level of capsule/bone, right foot  Type 2 diabetes mellitus with peripheral neuropathy  Other acquired hammer toe, bilaterally  History of 5th metatarsal head resection, right foot  History of osteomyelitis, right foot    It was under my recommendation that the patient be admitted to the hospital secondary to worsening right foot wound. He is currently admitted under the medicine team. I also discussed with him that he may have to undergo surgery once again for another debridement and possibly further resection of his 5th metatarsal. I will discuss this with him in more detail tomorrow morning. In the meantime, he is to be NPO at midnight, anticoagulation held. Keep dressing clean, dry, and intact. Please call with questions.     Thank you for this consultation and allowing me to participate in the care of this patient. I will continue to follow closely.  Please call with any questions or concerns.    Josef Egan DPM  Foot and Ankle Surgery  [facility]  [facility]  [phone]  4/5/2021  19:31 EDT

## 2021-04-05 NOTE — PLAN OF CARE
Problem: Adult Inpatient Plan of Care  Goal: Plan of Care Review  Outcome: Ongoing, Progressing  Flowsheets (Taken 4/5/2021 1615)  Plan of Care Reviewed With: patient  Goal: Patient-Specific Goal (Individualized)  Outcome: Ongoing, Progressing  Goal: Absence of Hospital-Acquired Illness or Injury  Outcome: Ongoing, Progressing  Intervention: Identify and Manage Fall Risk  Recent Flowsheet Documentation  Taken 4/5/2021 1508 by Rosanna Wilde RN  Safety Promotion/Fall Prevention:   safety round/check completed   nonskid shoes/slippers when out of bed  Taken 4/5/2021 1427 by Rosanna Wilde RN  Safety Promotion/Fall Prevention:   safety round/check completed   nonskid shoes/slippers when out of bed  Intervention: Prevent Skin Injury  Recent Flowsheet Documentation  Taken 4/5/2021 1427 by Rosanna Wilde RN  Body Position: position changed independently  Skin Protection:   tubing/devices free from skin contact   skin-to-device areas padded  Intervention: Prevent Infection  Recent Flowsheet Documentation  Taken 4/5/2021 1508 by Rosanna Wilde RN  Infection Prevention:   rest/sleep promoted   single patient room provided  Taken 4/5/2021 1427 by Rosanna Wilde RN  Infection Prevention:   rest/sleep promoted   single patient room provided  Goal: Optimal Comfort and Wellbeing  Outcome: Ongoing, Progressing  Intervention: Provide Person-Centered Care  Recent Flowsheet Documentation  Taken 4/5/2021 1427 by Rosanna Wilde RN  Trust Relationship/Rapport:   care explained   thoughts/feelings acknowledged  Goal: Readiness for Transition of Care  Outcome: Ongoing, Progressing  Intervention: Mutually Develop Transition Plan  Recent Flowsheet Documentation  Taken 4/5/2021 1406 by Rosanna Wilde RN  Transportation Anticipated: family or friend will provide  Patient/Family Anticipated Services at Transition:   Patient/Family Anticipates Transition to: home with family  Taken 4/5/2021 1403 by Rosanna Wilde RN  Equipment  Currently Used at Home: nebulizer     Problem: Pain Acute  Goal: Optimal Pain Control  Outcome: Ongoing, Progressing  Intervention: Prevent or Manage Pain  Recent Flowsheet Documentation  Taken 4/5/2021 1427 by Rosanna Wilde RN  Sensory Stimulation Regulation: quiet environment promoted  Intervention: Optimize Psychosocial Wellbeing  Recent Flowsheet Documentation  Taken 4/5/2021 1427 by Rosanna Wilde RN  Supportive Measures: active listening utilized  Diversional Activities: television     Problem: Diabetes Comorbidity  Goal: Blood Glucose Level Within Desired Range  Outcome: Ongoing, Progressing  Intervention: Maintain Glycemic Control  Recent Flowsheet Documentation  Taken 4/5/2021 1427 by Rosanna Wilde RN  Glycemic Management: blood glucose monitoring     Problem: Wound  Goal: Optimal Wound Healing  Outcome: Ongoing, Progressing     Problem: Skin Injury Risk Increased  Goal: Skin Health and Integrity  Outcome: Ongoing, Progressing  Intervention: Optimize Skin Protection  Recent Flowsheet Documentation  Taken 4/5/2021 1427 by Rosanna Wilde RN  Pressure Reduction Techniques: frequent weight shift encouraged  Head of Bed (HOB): HOB at 20-30 degrees  Pressure Reduction Devices: pressure-redistributing mattress utilized  Skin Protection:   tubing/devices free from skin contact   skin-to-device areas padded   Goal Outcome Evaluation:  Plan of Care Reviewed With: patient      Pt DA from Podiatry office. Pt has Right diabatic ulcer. Admission complete. Head to toe complete. Skin assessment complete, pictures on chart. Call placed to MD Jones for admission orders. Will continue to monitor.

## 2021-04-06 ENCOUNTER — APPOINTMENT (OUTPATIENT)
Dept: GENERAL RADIOLOGY | Facility: HOSPITAL | Age: 76
End: 2021-04-06

## 2021-04-06 ENCOUNTER — ANESTHESIA EVENT (OUTPATIENT)
Dept: PERIOP | Facility: HOSPITAL | Age: 76
End: 2021-04-06

## 2021-04-06 ENCOUNTER — ANESTHESIA (OUTPATIENT)
Dept: PERIOP | Facility: HOSPITAL | Age: 76
End: 2021-04-06

## 2021-04-06 PROBLEM — L97.514: Status: ACTIVE | Noted: 2021-04-06

## 2021-04-06 LAB
ABO GROUP BLD: NORMAL
ALBUMIN SERPL-MCNC: 3.1 G/DL (ref 3.5–5.2)
ALBUMIN/GLOB SERPL: 1.2 G/DL
ALP SERPL-CCNC: 143 U/L (ref 39–117)
ALT SERPL W P-5'-P-CCNC: 12 U/L (ref 1–41)
ANION GAP SERPL CALCULATED.3IONS-SCNC: 9 MMOL/L (ref 5–15)
APTT PPP: 25.4 SECONDS (ref 24–31)
AST SERPL-CCNC: 14 U/L (ref 1–40)
BACTERIA UR QL AUTO: ABNORMAL /HPF
BASOPHILS # BLD AUTO: 0.1 10*3/MM3 (ref 0–0.2)
BASOPHILS NFR BLD AUTO: 1 % (ref 0–1.5)
BILIRUB SERPL-MCNC: 0.5 MG/DL (ref 0–1.2)
BILIRUB UR QL STRIP: NEGATIVE
BLD GP AB SCN SERPL QL: NEGATIVE
BUN SERPL-MCNC: 30 MG/DL (ref 8–23)
BUN/CREAT SERPL: 25.2 (ref 7–25)
CALCIUM SPEC-SCNC: 8.6 MG/DL (ref 8.6–10.5)
CHLORIDE SERPL-SCNC: 100 MMOL/L (ref 98–107)
CLARITY UR: CLEAR
CO2 SERPL-SCNC: 23 MMOL/L (ref 22–29)
COLOR UR: YELLOW
CREAT SERPL-MCNC: 1.19 MG/DL (ref 0.76–1.27)
DEPRECATED RDW RBC AUTO: 47.7 FL (ref 37–54)
EOSINOPHIL # BLD AUTO: 0.2 10*3/MM3 (ref 0–0.4)
EOSINOPHIL NFR BLD AUTO: 2.3 % (ref 0.3–6.2)
ERYTHROCYTE [DISTWIDTH] IN BLOOD BY AUTOMATED COUNT: 15.7 % (ref 12.3–15.4)
GFR SERPL CREATININE-BSD FRML MDRD: 60 ML/MIN/1.73
GLOBULIN UR ELPH-MCNC: 2.6 GM/DL
GLUCOSE BLDC GLUCOMTR-MCNC: 208 MG/DL (ref 70–105)
GLUCOSE BLDC GLUCOMTR-MCNC: 218 MG/DL (ref 70–105)
GLUCOSE BLDC GLUCOMTR-MCNC: 221 MG/DL (ref 70–105)
GLUCOSE BLDC GLUCOMTR-MCNC: 287 MG/DL (ref 70–105)
GLUCOSE BLDC GLUCOMTR-MCNC: 324 MG/DL (ref 70–105)
GLUCOSE SERPL-MCNC: 232 MG/DL (ref 65–99)
GLUCOSE UR STRIP-MCNC: ABNORMAL MG/DL
HCT VFR BLD AUTO: 38 % (ref 37.5–51)
HGB BLD-MCNC: 13.2 G/DL (ref 13–17.7)
HGB UR QL STRIP.AUTO: ABNORMAL
HYALINE CASTS UR QL AUTO: ABNORMAL /LPF
INR PPP: 0.96 (ref 0.93–1.1)
KETONES UR QL STRIP: NEGATIVE
LEUKOCYTE ESTERASE UR QL STRIP.AUTO: NEGATIVE
LYMPHOCYTES # BLD AUTO: 1.7 10*3/MM3 (ref 0.7–3.1)
LYMPHOCYTES NFR BLD AUTO: 19.1 % (ref 19.6–45.3)
MCH RBC QN AUTO: 30.2 PG (ref 26.6–33)
MCHC RBC AUTO-ENTMCNC: 34.7 G/DL (ref 31.5–35.7)
MCV RBC AUTO: 86.9 FL (ref 79–97)
MONOCYTES # BLD AUTO: 0.7 10*3/MM3 (ref 0.1–0.9)
MONOCYTES NFR BLD AUTO: 7.6 % (ref 5–12)
NEUTROPHILS NFR BLD AUTO: 6.2 10*3/MM3 (ref 1.7–7)
NEUTROPHILS NFR BLD AUTO: 70 % (ref 42.7–76)
NITRITE UR QL STRIP: NEGATIVE
NRBC BLD AUTO-RTO: 0.2 /100 WBC (ref 0–0.2)
PH UR STRIP.AUTO: 5.5 [PH] (ref 5–8)
PLATELET # BLD AUTO: 254 10*3/MM3 (ref 140–450)
PMV BLD AUTO: 8.3 FL (ref 6–12)
POTASSIUM SERPL-SCNC: 4.8 MMOL/L (ref 3.5–5.2)
PROT SERPL-MCNC: 5.7 G/DL (ref 6–8.5)
PROT UR QL STRIP: NEGATIVE
PROTHROMBIN TIME: 10.6 SECONDS (ref 9.6–11.7)
QT INTERVAL: 455 MS
RBC # BLD AUTO: 4.38 10*6/MM3 (ref 4.14–5.8)
RBC # UR: ABNORMAL /HPF
REF LAB TEST METHOD: ABNORMAL
RH BLD: POSITIVE
SODIUM SERPL-SCNC: 132 MMOL/L (ref 136–145)
SP GR UR STRIP: 1.02 (ref 1–1.03)
SQUAMOUS #/AREA URNS HPF: ABNORMAL /HPF
T&S EXPIRATION DATE: NORMAL
UROBILINOGEN UR QL STRIP: ABNORMAL
WBC # BLD AUTO: 8.8 10*3/MM3 (ref 3.4–10.8)
WBC UR QL AUTO: ABNORMAL /HPF

## 2021-04-06 PROCEDURE — 87186 SC STD MICRODIL/AGAR DIL: CPT | Performed by: PODIATRIST

## 2021-04-06 PROCEDURE — 94799 UNLISTED PULMONARY SVC/PX: CPT

## 2021-04-06 PROCEDURE — 86850 RBC ANTIBODY SCREEN: CPT | Performed by: PODIATRIST

## 2021-04-06 PROCEDURE — 85730 THROMBOPLASTIN TIME PARTIAL: CPT | Performed by: FAMILY MEDICINE

## 2021-04-06 PROCEDURE — 63710000001 INSULIN LISPRO (HUMAN) PER 5 UNITS: Performed by: PODIATRIST

## 2021-04-06 PROCEDURE — 88311 DECALCIFY TISSUE: CPT | Performed by: PODIATRIST

## 2021-04-06 PROCEDURE — 25010000002 CEFTRIAXONE PER 250 MG: Performed by: PODIATRIST

## 2021-04-06 PROCEDURE — 81001 URINALYSIS AUTO W/SCOPE: CPT | Performed by: PODIATRIST

## 2021-04-06 PROCEDURE — 25010000002 PROPOFOL 10 MG/ML EMULSION: Performed by: ANESTHESIOLOGY

## 2021-04-06 PROCEDURE — 93005 ELECTROCARDIOGRAM TRACING: CPT | Performed by: PODIATRIST

## 2021-04-06 PROCEDURE — 86901 BLOOD TYPING SEROLOGIC RH(D): CPT | Performed by: PODIATRIST

## 2021-04-06 PROCEDURE — 86900 BLOOD TYPING SEROLOGIC ABO: CPT | Performed by: PODIATRIST

## 2021-04-06 PROCEDURE — 63710000001 INSULIN LISPRO (HUMAN) PER 5 UNITS: Performed by: FAMILY MEDICINE

## 2021-04-06 PROCEDURE — 25010000002 DEXAMETHASONE PER 1 MG: Performed by: ANESTHESIOLOGY

## 2021-04-06 PROCEDURE — 85610 PROTHROMBIN TIME: CPT | Performed by: FAMILY MEDICINE

## 2021-04-06 PROCEDURE — 73620 X-RAY EXAM OF FOOT: CPT

## 2021-04-06 PROCEDURE — 80053 COMPREHEN METABOLIC PANEL: CPT | Performed by: FAMILY MEDICINE

## 2021-04-06 PROCEDURE — 97162 PT EVAL MOD COMPLEX 30 MIN: CPT

## 2021-04-06 PROCEDURE — 71045 X-RAY EXAM CHEST 1 VIEW: CPT

## 2021-04-06 PROCEDURE — 88304 TISSUE EXAM BY PATHOLOGIST: CPT | Performed by: PODIATRIST

## 2021-04-06 PROCEDURE — 87075 CULTR BACTERIA EXCEPT BLOOD: CPT | Performed by: PODIATRIST

## 2021-04-06 PROCEDURE — 76000 FLUOROSCOPY <1 HR PHYS/QHP: CPT

## 2021-04-06 PROCEDURE — 97530 THERAPEUTIC ACTIVITIES: CPT

## 2021-04-06 PROCEDURE — 87077 CULTURE AEROBIC IDENTIFY: CPT | Performed by: PODIATRIST

## 2021-04-06 PROCEDURE — 82962 GLUCOSE BLOOD TEST: CPT

## 2021-04-06 PROCEDURE — 0QBN0ZX EXCISION OF RIGHT METATARSAL, OPEN APPROACH, DIAGNOSTIC: ICD-10-PCS | Performed by: PODIATRIST

## 2021-04-06 PROCEDURE — 87070 CULTURE OTHR SPECIMN AEROBIC: CPT | Performed by: PODIATRIST

## 2021-04-06 PROCEDURE — 25010000002 HYDROMORPHONE PER 4 MG: Performed by: PODIATRIST

## 2021-04-06 PROCEDURE — 85025 COMPLETE CBC W/AUTO DIFF WBC: CPT | Performed by: FAMILY MEDICINE

## 2021-04-06 PROCEDURE — 87205 SMEAR GRAM STAIN: CPT | Performed by: PODIATRIST

## 2021-04-06 RX ORDER — CLINDAMYCIN PHOSPHATE 600 MG/50ML
600 INJECTION, SOLUTION INTRAVENOUS EVERY 8 HOURS
Status: DISCONTINUED | OUTPATIENT
Start: 2021-04-06 | End: 2021-04-09 | Stop reason: HOSPADM

## 2021-04-06 RX ORDER — HYDROMORPHONE HCL 110MG/55ML
0.25 PATIENT CONTROLLED ANALGESIA SYRINGE INTRAVENOUS EVERY 4 HOURS PRN
Status: DISCONTINUED | OUTPATIENT
Start: 2021-04-06 | End: 2021-04-09 | Stop reason: HOSPADM

## 2021-04-06 RX ORDER — MAGNESIUM HYDROXIDE 1200 MG/15ML
LIQUID ORAL AS NEEDED
Status: DISCONTINUED | OUTPATIENT
Start: 2021-04-06 | End: 2021-04-06 | Stop reason: HOSPADM

## 2021-04-06 RX ORDER — DEXAMETHASONE SODIUM PHOSPHATE 4 MG/ML
INJECTION, SOLUTION INTRA-ARTICULAR; INTRALESIONAL; INTRAMUSCULAR; INTRAVENOUS; SOFT TISSUE AS NEEDED
Status: DISCONTINUED | OUTPATIENT
Start: 2021-04-06 | End: 2021-04-06 | Stop reason: SURG

## 2021-04-06 RX ORDER — HYDROMORPHONE HCL 110MG/55ML
1 PATIENT CONTROLLED ANALGESIA SYRINGE INTRAVENOUS
Status: DISCONTINUED | OUTPATIENT
Start: 2021-04-06 | End: 2021-04-06 | Stop reason: HOSPADM

## 2021-04-06 RX ORDER — INSULIN LISPRO 100 [IU]/ML
0-7 INJECTION, SOLUTION INTRAVENOUS; SUBCUTANEOUS AS NEEDED
Status: DISCONTINUED | OUTPATIENT
Start: 2021-04-06 | End: 2021-04-06

## 2021-04-06 RX ORDER — ROPINIROLE 0.25 MG/1
0.5 TABLET, FILM COATED ORAL EVERY 12 HOURS SCHEDULED
Status: DISCONTINUED | OUTPATIENT
Start: 2021-04-06 | End: 2021-04-09 | Stop reason: HOSPADM

## 2021-04-06 RX ORDER — PROPOFOL 10 MG/ML
VIAL (ML) INTRAVENOUS AS NEEDED
Status: DISCONTINUED | OUTPATIENT
Start: 2021-04-06 | End: 2021-04-06 | Stop reason: SURG

## 2021-04-06 RX ORDER — ACETAMINOPHEN 650 MG
TABLET, EXTENDED RELEASE ORAL AS NEEDED
Status: DISCONTINUED | OUTPATIENT
Start: 2021-04-06 | End: 2021-04-06 | Stop reason: HOSPADM

## 2021-04-06 RX ORDER — LIDOCAINE HYDROCHLORIDE 20 MG/ML
INJECTION, SOLUTION EPIDURAL; INFILTRATION; INTRACAUDAL; PERINEURAL AS NEEDED
Status: DISCONTINUED | OUTPATIENT
Start: 2021-04-06 | End: 2021-04-06 | Stop reason: SURG

## 2021-04-06 RX ORDER — INSULIN LISPRO 100 [IU]/ML
0-7 INJECTION, SOLUTION INTRAVENOUS; SUBCUTANEOUS
Status: DISCONTINUED | OUTPATIENT
Start: 2021-04-06 | End: 2021-04-06

## 2021-04-06 RX ORDER — HYDROMORPHONE HCL 110MG/55ML
0.5 PATIENT CONTROLLED ANALGESIA SYRINGE INTRAVENOUS
Status: DISCONTINUED | OUTPATIENT
Start: 2021-04-06 | End: 2021-04-06

## 2021-04-06 RX ORDER — DEXTROSE MONOHYDRATE 25 G/50ML
25 INJECTION, SOLUTION INTRAVENOUS
Status: DISCONTINUED | OUTPATIENT
Start: 2021-04-06 | End: 2021-04-06

## 2021-04-06 RX ORDER — OXYCODONE HYDROCHLORIDE 5 MG/1
5 TABLET ORAL ONCE AS NEEDED
Status: DISCONTINUED | OUTPATIENT
Start: 2021-04-06 | End: 2021-04-06 | Stop reason: HOSPADM

## 2021-04-06 RX ORDER — NICOTINE POLACRILEX 4 MG
15 LOZENGE BUCCAL
Status: DISCONTINUED | OUTPATIENT
Start: 2021-04-06 | End: 2021-04-06

## 2021-04-06 RX ADMIN — FUROSEMIDE 20 MG: 20 TABLET ORAL at 11:44

## 2021-04-06 RX ADMIN — ATORVASTATIN CALCIUM 40 MG: 40 TABLET, FILM COATED ORAL at 19:38

## 2021-04-06 RX ADMIN — IPRATROPIUM BROMIDE AND ALBUTEROL SULFATE 3 ML: 2.5; .5 SOLUTION RESPIRATORY (INHALATION) at 11:02

## 2021-04-06 RX ADMIN — PREGABALIN 150 MG: 75 CAPSULE ORAL at 11:44

## 2021-04-06 RX ADMIN — METOPROLOL TARTRATE 25 MG: 25 TABLET, FILM COATED ORAL at 19:39

## 2021-04-06 RX ADMIN — TRAZODONE HYDROCHLORIDE 100 MG: 100 TABLET ORAL at 19:39

## 2021-04-06 RX ADMIN — HYDROMORPHONE HYDROCHLORIDE 0.25 MG: 2 INJECTION, SOLUTION INTRAMUSCULAR; INTRAVENOUS; SUBCUTANEOUS at 10:58

## 2021-04-06 RX ADMIN — OXYCODONE 10 MG: 5 TABLET ORAL at 21:17

## 2021-04-06 RX ADMIN — ROPINIROLE 0.5 MG: 0.25 TABLET, FILM COATED ORAL at 11:43

## 2021-04-06 RX ADMIN — HYDROMORPHONE HYDROCHLORIDE 0.25 MG: 2 INJECTION, SOLUTION INTRAMUSCULAR; INTRAVENOUS; SUBCUTANEOUS at 15:37

## 2021-04-06 RX ADMIN — PROPOFOL 200 MG: 10 INJECTION, EMULSION INTRAVENOUS at 08:45

## 2021-04-06 RX ADMIN — OXYCODONE 10 MG: 5 TABLET ORAL at 16:48

## 2021-04-06 RX ADMIN — CLINDAMYCIN PHOSPHATE 600 MG: 600 INJECTION, SOLUTION INTRAVENOUS at 15:37

## 2021-04-06 RX ADMIN — BUDESONIDE 0.5 MG: 0.5 SUSPENSION RESPIRATORY (INHALATION) at 07:21

## 2021-04-06 RX ADMIN — DEXAMETHASONE SODIUM PHOSPHATE 4 MG: 4 INJECTION, SOLUTION INTRAMUSCULAR; INTRAVENOUS at 08:53

## 2021-04-06 RX ADMIN — HYDROMORPHONE HYDROCHLORIDE 0.25 MG: 2 INJECTION, SOLUTION INTRAMUSCULAR; INTRAVENOUS; SUBCUTANEOUS at 19:50

## 2021-04-06 RX ADMIN — IPRATROPIUM BROMIDE AND ALBUTEROL SULFATE 3 ML: 2.5; .5 SOLUTION RESPIRATORY (INHALATION) at 07:16

## 2021-04-06 RX ADMIN — OXYCODONE 10 MG: 5 TABLET ORAL at 11:43

## 2021-04-06 RX ADMIN — IPRATROPIUM BROMIDE AND ALBUTEROL SULFATE 3 ML: 2.5; .5 SOLUTION RESPIRATORY (INHALATION) at 15:00

## 2021-04-06 RX ADMIN — MIRTAZAPINE 30 MG: 15 TABLET, FILM COATED ORAL at 19:39

## 2021-04-06 RX ADMIN — INSULIN LISPRO 4 UNITS: 100 INJECTION, SOLUTION INTRAVENOUS; SUBCUTANEOUS at 11:43

## 2021-04-06 RX ADMIN — OXYCODONE 10 MG: 5 TABLET ORAL at 02:34

## 2021-04-06 RX ADMIN — INSULIN LISPRO 4 UNITS: 100 INJECTION, SOLUTION INTRAVENOUS; SUBCUTANEOUS at 07:42

## 2021-04-06 RX ADMIN — LIDOCAINE HYDROCHLORIDE 100 MG: 20 INJECTION, SOLUTION EPIDURAL; INFILTRATION; INTRACAUDAL; PERINEURAL at 08:45

## 2021-04-06 RX ADMIN — CLINDAMYCIN PHOSPHATE 900 MG: 600 INJECTION, SOLUTION INTRAVENOUS at 08:50

## 2021-04-06 RX ADMIN — INSULIN LISPRO 7 UNITS: 100 INJECTION, SOLUTION INTRAVENOUS; SUBCUTANEOUS at 16:48

## 2021-04-06 RX ADMIN — ROPINIROLE 0.5 MG: 0.25 TABLET, FILM COATED ORAL at 19:38

## 2021-04-06 RX ADMIN — BUDESONIDE 0.5 MG: 0.5 SUSPENSION RESPIRATORY (INHALATION) at 19:05

## 2021-04-06 RX ADMIN — METOLAZONE 5 MG: 5 TABLET ORAL at 19:39

## 2021-04-06 RX ADMIN — CLINDAMYCIN PHOSPHATE 600 MG: 600 INJECTION, SOLUTION INTRAVENOUS at 23:51

## 2021-04-06 RX ADMIN — CEFTRIAXONE SODIUM 1 G: 1 INJECTION, POWDER, FOR SOLUTION INTRAMUSCULAR; INTRAVENOUS at 19:39

## 2021-04-06 RX ADMIN — POTASSIUM CHLORIDE 10 MEQ: 750 TABLET, EXTENDED RELEASE ORAL at 19:39

## 2021-04-06 RX ADMIN — IPRATROPIUM BROMIDE AND ALBUTEROL SULFATE 3 ML: 2.5; .5 SOLUTION RESPIRATORY (INHALATION) at 19:00

## 2021-04-06 RX ADMIN — PREGABALIN 150 MG: 75 CAPSULE ORAL at 19:39

## 2021-04-06 NOTE — PROGRESS NOTES
Foot and Ankle Surgery Progress Note    04/06/21   Provider: Dr. Josef Egan, SENA  Location: [facility]    Chief Complaint: No chief complaint on file.       Subjective:  Ro Nathan is a 75 y.o. male.  Chart reviewed.  No acute events overnight.  Patient denies any pain to his right foot.  He states his dressing has remained clean, dry, intact.  He is concerned that he may lose his foot.  He denies nausea, fever, vomiting, chills.  He is agreeable to proceed with surgery today.    Allergies   Allergen Reactions   • Haloperidol Hives   • Morphine Itching   • Pantoprazole Other (See Comments)     Feels sick after receiving       No current facility-administered medications on file prior to encounter.     Current Outpatient Medications on File Prior to Encounter   Medication Sig Dispense Refill   • atorvastatin (LIPITOR) 40 MG tablet Take 1 tablet by mouth Every Night. 30 tablet 1   • budesonide (PULMICORT) 0.5 MG/2ML nebulizer solution      • furosemide (LASIX) 20 MG tablet Take 20 mg by mouth Daily. Do not take dos     • ipratropium-albuterol (DUO-NEB) 0.5-2.5 mg/3 ml nebulizer      • losartan (COZAAR) 50 MG tablet Take 50 mg by mouth Every Morning. Do not take 24 hours prior to surgery.  LD 1-4-2021 before 1245     • Lyrica 150 MG capsule Take 1 capsule by mouth 2 (Two) Times a Day. 60 capsule 2   • metOLazone (ZAROXOLYN) 5 MG tablet Take 5 mg by mouth Every Night.     • metoprolol tartrate (LOPRESSOR) 25 MG tablet Take 1 tablet by mouth Every 12 (Twelve) Hours. (Patient taking differently: Take 50 mg by mouth Every 12 (Twelve) Hours. Take dos) 60 tablet 1   • mirtazapine (REMERON) 30 MG tablet Take 30 mg by mouth Every Night.     • oxyCODONE-acetaminophen (Percocet)  MG per tablet Take 1 tablet by mouth Every 6 (Six) Hours As Needed for Moderate Pain . 120 tablet 0   • Perforomist 20 MCG/2ML nebulizer solution      • potassium chloride (K-DUR) 10 MEQ CR tablet Take 10 mEq by mouth 2  "(Two) Times a Day. Take dos     • prasugrel (EFFIENT) 10 MG tablet TAKE ONE TABLET BY MOUTH DAILY 30 tablet 0   • traZODone (DESYREL) 100 MG tablet Take 100 mg by mouth Every Night.     • VENTOLIN  (90 Base) MCG/ACT inhaler Inhale 2 puffs Every 6 (Six) Hours As Needed for Wheezing or Shortness of Air. Use or bring dos         Objective   /73 (BP Location: Right arm, Patient Position: Lying)   Pulse 70   Temp 97.5 °F (36.4 °C) (Oral)   Resp 16   Ht 182.9 cm (72\")   Wt 127 kg (280 lb)   SpO2 98%   BMI 37.97 kg/m²      Derm: There is a healed cicatrix noted to the lateral aspect of the foot at the level of the 5th metatarsal head. This cicatrix measures approximately 3 cm in length. There I evidence of a wound to the proximal end of this cicatrix measuring 0.5 x 0.5 X 0.4 cm to the level of capsule/bone. There are no signs of active infection appreciated. There is no increased erythema, drainage, malodor, purulence appreciated. The wound does probe deep to capsule or bone. Bone is not visualized within the wound. There does appear to be undermining of the wound circumferentially. There is no ascending lymphangitis appreciated.               Vasc: DP and PT pulses are faintly palpable. Capillary refill time is brisk to all digits. There is no increased edema appreciated.               MSK: Non-reducible dorsal flexion contractures noted to all lesser digits at the level of the PIPJs. Manual muscle strength tested and noted to be 5/5 in all groups involved. Range of motion about his ankle and all remaining joints of the foot is smooth smooth and pain free. No pain on palpation noted to his wound site.              Neuro: Epicritic sensation remains diminished to the level of the midfoot.      Results from last 7 days   Lab Units 04/06/21  0304   WBC 10*3/mm3 8.80   HEMOGLOBIN g/dL 13.2   HEMATOCRIT % 38.0   PLATELETS 10*3/mm3 254       No results found for: WOUNDCX     XR Foot 3+ View Right    Result " Date: 4/5/2021  No radiographic findings of osteomyelitis. MRI exam is more sensitive to evaluate for ostomy myelitis. Status post resection of the head of the fifth metatarsal. Osteoarthritis as described.  Electronically Signed By-Antoinette August MD On:4/5/2021 4:56 PM This report was finalized on [id number] by  nAtoinette August MD.       * No orders in the log *     Assessment/Plan     Patient Active Problem List   Diagnosis   • Skin ulcer (CMS/HCC)   • Chronic low back pain   • DDD (degenerative disc disease), lumbar   • Spondylosis of lumbosacral region   • Paroxysmal atrial fibrillation (CMS/HCC)   • Chronic obstructive pulmonary disease (CMS/HCC)   • Atherosclerosis of native coronary artery of native heart with angina pectoris (CMS/HCC)   • Gastroesophageal reflux disease   • Lumbar radiculopathy   • Other hammer toe(s) (acquired), right foot   • Presence of cardiac pacemaker   • Status post coronary artery stent placement   • COPD (chronic obstructive pulmonary disease) (CMS/HCC)   • Stented coronary artery   • Small bowel obstruction due to adhesions (CMS/HCC)   • Elevated lipoprotein(a)   • Diabetic foot ulcer (CMS/HCC)   • Ventral hernia with obstruction but no gangrene   • DM type 2 with diabetic dyslipidemia (CMS/HCC)   • Peripheral vascular disease due to secondary diabetes (CMS/HCC)   • Benign hypertension with CKD (chronic kidney disease) stage III (CMS/HCC)   • Stage 3a chronic kidney disease (CMS/HCC)   • Type 2 diabetes, controlled, with neuropathy (CMS/HCC)   • Chronic bronchitis with COPD (chronic obstructive pulmonary disease) (CMS/HCC)   • Alcoholic cirrhosis of liver with ascites (CMS/HCC)   • Chronic respiratory failure with hypoxia (CMS/HCC)   • Diabetic ulcer of right midfoot associated with type 2 diabetes mellitus (CMS/HCC)   • Dyspnea on exertion   • Chronic ulcer of right foot with necrosis of muscle (CMS/HCC)   • Ischemic ulcer of foot with fat layer exposed, right (CMS/HCC)   • Right  foot ulcer (CMS/HCC)   • Arthritis   • Congestive heart failure (CMS/HCC)   • Hiatal hernia   • Presence of coronary angioplasty implant and graft   • Coronary atherosclerosis   • Chronic obstructive bronchitis (CMS/HCC)   • Degeneration of lumbar intervertebral disc   • Ischemic ulcer of foot (CMS/HCC)   • Type 2 diabetes mellitus (CMS/HCC)   • Benign hypertension   • Acquired hallux malleus   • Peripheral vascular disease due to secondary diabetes mellitus (CMS/HCC)   • Obstruction of small intestine due to peritoneal adhesion (CMS/HCC)   • Lumbosacral spondylosis   • Obstructed ventral hernia   • Foot ulceration, right, with necrosis of bone (CMS/HCC)       Diabetic foot ulcer to the level of capsule/bone, right foot  Type 2 diabetes mellitus with peripheral neuropathy  Other acquired hammer toe, bilaterally  History of 5th metatarsal head resection, right foot  History of osteomyelitis, right foot    Patient seen and examined at bedside.  The plan was discussed at length with the patient.  Today I am recommending incision and drainage with debridement of all nonviable soft tissue and bone with partial excision of fifth metatarsal.  I did explain to him that he is at high risk for continued infection and possibility of further amputation.  I did explain to him that his sugars have been severely elevated and this is definitely a component of him not healing his wounds.  I did discuss with him all risks and benefits including recurrent infection, increased pain, possibility for more proximal amputation and if infection does worsen it could get to his bloodstream which can cause sepsis which can be life-threatening.  He did verbalize understanding and agreed to proceed with surgery anyway.  Again no guarantees were verbalized to the patient.  He is to remain n.p.o., obtain consent.  I also discussed with the patient that he may have to undergo a subsequent procedure during this hospital stay depending on what his  wound and infection look like.  He did verbalize understanding.  This case was discussed with the RN.  Please call with questions.    Note is dictated utilizing voice recognition software. Unfortunately this leads to occasional typographical errors. I apologize in advance if the situation occurs. If questions occur please do not hesitate to call our office.

## 2021-04-06 NOTE — PROGRESS NOTES
Discharge Planning Assessment   Cl[facility]     Patient Name: Ro Nathan  MRN: [id number]  Today's Date: 4/6/2021    Admit Date: 4/5/2021    Discharge Needs Assessment     Row Name 04/06/21 1532       Living Environment    Lives With  child(elsa), adult;spouse    Name(s) of Who Lives With Patient  Meghan    Current Living Arrangements  home/apartment/condo    Primary Care Provided by  spouse/significant other;self    Provides Primary Care For  no one    Caregiving Concerns  --    Family Caregiver if Needed  spouse    Family Caregiver Names  Meghan    Able to Return to Prior Arrangements  yes       Resource/Environmental Concerns    Resource/Environmental Concerns  none    Transportation Concerns  car, none       Transition Planning    Patient/Family Anticipates Transition to  home with family    Transportation Anticipated  family or friend will provide       Discharge Needs Assessment    Readmission Within the Last 30 Days  no previous admission in last 30 days    Equipment Currently Used at Home  nebulizer    Concerns to be Addressed  denies needs/concerns at this time    Concerns Comments  Pt is nonweightbearing RLE    Anticipated Changes Related to Illness  none    Equipment Needed After Discharge  --    Discharge Coordination/Progress  DC Plan: Home with spouse. Current IV antibiotics. Possible need for Home health.        Discharge Plan     Row Name 04/06/21 3498       Plan    Plan  DC Plan: Home with spouse, current IV aantibiotics. Pending PT    Plan Comments  Surgery 4/6 RLE nonweightbearing.        Continued Care and Services - Admitted Since 4/5/2021    Coordination has not been started for this encounter.     Selected Continued Care - Prior Encounters Includes selections from prior encounters from 1/5/2021 to 4/6/2021    Discharged on 1/24/2021 Admission date: 1/20/2021 - [place] [facility] [place]     Service Provider Selected Services Address Phone Fax Patient  Preferred    [facility] [address] [place] [address] [phone] --                      Demographic Summary     Row Name 04/06/21 1529       General Information    Admission Type  observation;inpatient    Arrived From  emergency department    Required Notices Provided  Observation Status Notice;Important Message from Medicare    Referral Source  admission list    Reason for Consult  discharge planning    Preferred Language  English     Used During This Interaction  no    General Information Comments  Spoke to pt in room.        Functional Status     Row Name 04/06/21 1546       Functional Status    Usual Activity Tolerance  good    Current Activity Tolerance  moderate    Functional Status Comments  R foot in surgical drsg.       Functional Status, IADL    Medications  independent    Meal Preparation  assistive person    Housekeeping  assistive person    Laundry  assistive person    Shopping  assistive person       Mental Status    General Appearance WDL  WDL       Mental Status Summary    Recent Changes in Mental Status/Cognitive Functioning  no changes    Row Name 04/06/21 1529       Functional Status    Usual Activity Tolerance  good    Current Activity Tolerance  moderate       Functional Status, IADL    Medications  independent    Meal Preparation  independent    Housekeeping  independent    Laundry  independent    Shopping  independent       Mental Status    General Appearance WDL  WDL       Mental Status Summary    Recent Changes in Mental Status/Cognitive Functioning  no changes        Met with patient in room wearing PPE: mask, goggles.      Maintained distance greater than six feet and spent less than 15 minutes in the room.               Margy Serrano RN

## 2021-04-06 NOTE — THERAPY EVALUATION
Patient Name: Ro Nathan  : 1945    MRN: [id number]                              Today's Date: 2021       Admit Date: 2021    Visit Dx:     ICD-10-CM ICD-9-CM   1. Diabetic foot ulcer (CMS/HCC)  E11.621 250.80    L97.509 707.15     Patient Active Problem List   Diagnosis   • Skin ulcer (CMS/HCC)   • Chronic low back pain   • DDD (degenerative disc disease), lumbar   • Spondylosis of lumbosacral region   • Paroxysmal atrial fibrillation (CMS/HCC)   • Chronic obstructive pulmonary disease (CMS/HCC)   • Atherosclerosis of native coronary artery of native heart with angina pectoris (CMS/HCC)   • Gastroesophageal reflux disease   • Lumbar radiculopathy   • Other hammer toe(s) (acquired), right foot   • Presence of cardiac pacemaker   • Status post coronary artery stent placement   • COPD (chronic obstructive pulmonary disease) (CMS/HCC)   • Stented coronary artery   • Small bowel obstruction due to adhesions (CMS/HCC)   • Elevated lipoprotein(a)   • Diabetic foot ulcer (CMS/HCC)   • Ventral hernia with obstruction but no gangrene   • DM type 2 with diabetic dyslipidemia (CMS/HCC)   • Peripheral vascular disease due to secondary diabetes (CMS/HCC)   • Benign hypertension with CKD (chronic kidney disease) stage III (CMS/HCC)   • Stage 3a chronic kidney disease (CMS/HCC)   • Type 2 diabetes, controlled, with neuropathy (CMS/HCC)   • Chronic bronchitis with COPD (chronic obstructive pulmonary disease) (CMS/HCC)   • Alcoholic cirrhosis of liver with ascites (CMS/HCC)   • Chronic respiratory failure with hypoxia (CMS/HCC)   • Diabetic ulcer of right midfoot associated with type 2 diabetes mellitus (CMS/HCC)   • Dyspnea on exertion   • Chronic ulcer of right foot with necrosis of muscle (CMS/HCC)   • Ischemic ulcer of foot with fat layer exposed, right (CMS/HCC)   • Right foot ulcer (CMS/HCC)   • Arthritis   • Congestive heart failure (CMS/HCC)   • Hiatal hernia   • Presence of coronary angioplasty  implant and graft   • Coronary atherosclerosis   • Chronic obstructive bronchitis (CMS/HCC)   • Degeneration of lumbar intervertebral disc   • Ischemic ulcer of foot (CMS/HCC)   • Type 2 diabetes mellitus (CMS/HCC)   • Benign hypertension   • Acquired hallux malleus   • Peripheral vascular disease due to secondary diabetes mellitus (CMS/HCC)   • Obstruction of small intestine due to peritoneal adhesion (CMS/HCC)   • Lumbosacral spondylosis   • Obstructed ventral hernia   • Foot ulceration, right, with necrosis of bone (CMS/HCC)     Past Medical History:   Diagnosis Date   • Alcoholic cirrhosis of liver with ascites (CMS/HCC) 10/26/2020   • Benign hypertension with CKD (chronic kidney disease) stage III (CMS/HCC) 10/26/2020   • Bruises easily    • CHF (congestive heart failure) (CMS/[facility])    • Chronic bronchitis with COPD (chronic obstructive pulmonary disease) (CMS/[facility]) 10/26/2020   • Chronic respiratory failure with hypoxia (CMS/[facility]) 10/26/2020   • Congenital heart defect    • Difficulty attaining erection    • Hypertension    • Low back pain    • Pacemaker    • Peripheral vascular disease due to secondary diabetes (CMS/HCC) 10/26/2020   • Poor circulation    • Prostate cancer (CMS/HCC)     prostate   • Shortness of breath    • Sleep apnea     using CPAP    • Stage 3a chronic kidney disease (CMS/HCC) 10/26/2020   • Stented coronary artery 12/05/2019    MICHAEL to LAD   • Type 2 diabetes, controlled, with neuropathy (CMS/HCC) 10/26/2020    no meds   • Type 2 diabetes, controlled, with neuropathy (CMS/[facility]) 10/26/2020     Past Surgical History:   Procedure Laterality Date   • ABDOMINAL SURGERY     • APPENDECTOMY     • BONE CURETTAGE Right 1/22/2021    Procedure: Wound excision with fifth metatarsal head resection, right foot.;  Surgeon: Josef Egan DPM;  Location: [facility] OR;  Service: Podiatry;  Laterality: Right;   • BONE INCISION AND DRAINAGE Right 10/5/2020    Procedure: Incision and drainage with  debridement of all nonviable soft tissue and bone with bone biopsy, right foot.;  Surgeon: Josef Egan DPM;  Location: [facility] MAIN OR;  Service: Podiatry;  Laterality: Right;   • CARDIAC CATHETERIZATION N/A 12/5/2019    Procedure: Left Heart Cath;  Surgeon: Mirza Munson MD;  Location: [facility] CATH INVASIVE LOCATION;  Service: Cardiology   • CARDIAC CATHETERIZATION N/A 12/5/2019    Procedure: Stent MICHAEL coronary;  Surgeon: Mirza Munson MD;  Location: [facility] CATH INVASIVE LOCATION;  Service: Cardiology   • CHOLECYSTECTOMY     • ELBOW PROCEDURE      left   • FOOT SURGERY      right foot   • HERNIA REPAIR     • INTERVENTIONAL RADIOLOGY PROCEDURE N/A 12/5/2019    Procedure: Intravascular Ultrasound;  Surgeon: Mirza Munson MD;  Location: [facility] CATH INVASIVE LOCATION;  Service: Cardiology   • PACEMAKER IMPLANTATION     • UT RT/LT HEART CATHETERS N/A 12/5/2019    Procedure: Percutaneous Coronary Intervention;  Surgeon: Mirza Munson MD;  Location: [facility] CATH INVASIVE LOCATION;  Service: Cardiology   • WOUND DEBRIDEMENT Right 10/29/2020    Procedure: Preparation and debridement of foot wound with application of Integra wound graft, right foot.;  Surgeon: Josef Egan DPM;  Location: [facility] MAIN OR;  Service: Podiatry;  Laterality: Right;     General Information     Row Name 04/06/21 1617          Physical Therapy Time and Intention    Document Type  evaluation  -CM     Mode of Treatment  physical therapy  -CM     Row Name 04/06/21 1617          General Information    Patient Profile Reviewed  yes  -CM     Prior Level of Function  independent:;all household mobility;community mobility;gait does not use assistive device at baseline  -CM     Existing Precautions/Restrictions  fall;non-weight bearing;right  -CM     Barriers to Rehab  medically complex  -CM     Row Name 04/06/21 1617          Living Environment    Lives With  spouse  -CM     Row Name 04/06/21  1617          Home Main Entrance    Number of Stairs, Main Entrance  none ramp into home  -CM     Row Name 04/06/21 1617          Stairs Within Home, Primary    Number of Stairs, Within Home, Primary  none  -CM     Row Name 04/06/21 1617          Cognition    Orientation Status (Cognition)  oriented x 4;other (see comments) anxious  -CM     Row Name 04/06/21 1617          Safety Issues, Functional Mobility    Safety Issues Affecting Function (Mobility)  insight into deficits/self-awareness  -CM     Impairments Affecting Function (Mobility)  balance;endurance/activity tolerance;strength;shortness of breath;pain  -CM       User Key  (r) = Recorded By, (t) = Taken By, (c) = Cosigned By    Initials Name Provider Type    Jayne Burnett, PT Physical Therapist        Mobility     Row Name 04/06/21 1618          Bed Mobility    Bed Mobility  bed mobility (all) activities  -CM     Comment (Bed Mobility)  already sitting eob when PT arrived, w/ LEs in dependent position  -CM     Row Name 04/06/21 1618          Transfers    Comment (Transfers)  very anxious w/ coming to sitting; needs bed surface elevated large amount; pivots on LLE w/o stepping to turn and sit in chair. Moves toward L side.  -CM     Row Name 04/06/21 1618          Bed-Chair Transfer    Bed-Chair [place] (Transfers)  minimum assist (75% patient effort);2 person assist  -CM     Assistive Device (Bed-Chair Transfers)  walker, front-wheeled  -CM     Row Name 04/06/21 1618          Sit-Stand Transfer    Sit-Stand [place] (Transfers)  moderate assist (50% patient effort);2 person assist;minimum assist (75% patient effort)  -CM     Assistive Device (Sit-Stand Transfers)  walker, front-wheeled  -CM     Row Name 04/06/21 1618          Gait/Stairs (Locomotion)    [place] Level (Gait)  not tested  -CM       User Key  (r) = Recorded By, (t) = Taken By, (c) = Cosigned By    Initials Name Provider Type    Jayne Burnett, PT Physical Therapist         Obj/Interventions     [facility] Name 04/06/21 1619          Range of Motion Comprehensive    General Range of Motion  no range of motion deficits identified  -CM     Row Name 04/06/21 1619          Strength Comprehensive (MMT)    General Manual Muscle Testing (MMT) Assessment  lower extremity strength deficits identified  -CM     Comment, General Manual Muscle Testing (MMT) Assessment  BLEs 3/5 at hips, 3+/5 other mm groups  -CM     Row Name 04/06/21 1619          Balance    Balance Assessment  sitting static balance;sitting dynamic balance;standing static balance;standing dynamic balance  -CM     Static Sitting Balance  WNL;unsupported;sitting, edge of bed  -CM     Dynamic Sitting Balance  WNL;unsupported;sitting, edge of bed  -CM     Static Standing Balance  standing;supported;mild impairment  -CM     Dynamic Standing Balance  moderate impairment;supported;standing  -CM     Row Name 04/06/21 1619          Sensory Assessment (Somatosensory)    Sensory Assessment (Somatosensory)  sensation intact  -CM       User Key  (r) = Recorded By, (t) = Taken By, (c) = Cosigned By    Initials Name Provider Type    CM Jayne Flores, PT Physical Therapist        Goals/Plan     Row Name 04/06/21 1624          Bed Mobility Goal 1 (PT)    Activity/Assistive Device (Bed Mobility Goal 1, PT)  bed mobility activities, all  -CM     [place] Level/Cues Needed (Bed Mobility Goal 1, PT)  modified independence  -CM     Time Frame (Bed Mobility Goal 1, PT)  2 weeks  -CM     Row Name 04/06/21 1624          Transfer Goal 1 (PT)    Activity/Assistive Device (Transfer Goal 1, PT)  transfers, all;walker, rolling  -CM     [place] Level/Cues Needed (Transfer Goal 1, PT)  supervision required  -CM     Time Frame (Transfer Goal 1, PT)  2 weeks  -CM     Row Name 04/06/21 1624          Gait Training Goal 1 (PT)    Activity/Assistive Device (Gait Training Goal 1, PT)  gait (walking locomotion);walker, rolling  -CM     [place] Level (Gait  Training Goal 1, PT)  minimum assist (75% or more patient effort);moderate assist (50-74% patient effort);2 person assist  -CM     Distance (Gait Training Goal 1, PT)  15 ft  -CM     Time Frame (Gait Training Goal 1, PT)  2 weeks  -CM       User Key  (r) = Recorded By, (t) = Taken By, (c) = Cosigned By    Initials Name Provider Type    Jayne Burnett, PT Physical Therapist        Clinical Impression     Row Name 04/06/21 1620          Pain    Additional Documentation  Pain Scale: Numbers Pre/Post-Treatment (Group)  -CM     Row Name 04/06/21 1620          Pain Scale: Numbers Pre/Post-Treatment    Pretreatment Pain Rating  6/10  -CM     Posttreatment Pain Rating  6/10  -CM     Pain Location  back  -CM     Pain Intervention(s)  Medication (See MAR);Repositioned;Emotional support;Ambulation/increased activity  -CM     Row Name 04/06/21 1620          Plan of Care Review    Plan of Care Reviewed With  patient  -CM     Outcome Summary  76 yo male seen s/p I&D of R foot w/ partial 5th metatarsal resection. Pt is normally able to amb on his own. Today, he needs min assist of 2 at rw to come to stand and transfer to chair. Able to pivot on LLE but could not step. Maintains nwb status for RLE well. Pt was exhausted after trasnferring to chair one time. He is not safe for home, as his wife is not able to provide the high level of care he currently needs. Pt very motivated toward recovery.. Will need IP rehab at d/c. Will follow. PPE: gloves, mask, goggles.  -     Row Name 04/06/21 1620          Therapy Assessment/Plan (PT)    Patient/Family Therapy Goals Statement (PT)  agreeable to IP rehab  -CM     Rehab Potential (PT)  good, to achieve stated therapy goals  -CM     Criteria for Skilled Interventions Met (PT)  yes;meets criteria;skilled treatment is necessary  -CM     Predicted Duration of Therapy Intervention (PT)  until d/c  -     Row Name 04/06/21 1620          Positioning and Restraints    Pre-Treatment Position   in bed  -CM     Post Treatment Position  chair  -CM     In Chair  notified nsg;sitting;call light within reach;encouraged to call for assist;exit alarm on;legs elevated  -CM       User Key  (r) = Recorded By, (t) = Taken By, (c) = Cosigned By    Initials Name Provider Type    Jayne Burnett, PT Physical Therapist        Outcome Measures     Row Name 04/06/21 1624          How much help from another person do you currently need...    Turning from your back to your side while in flat bed without using bedrails?  4  -CM     Moving from lying on back to sitting on the side of a flat bed without bedrails?  3  -CM     Moving to and from a bed to a chair (including a wheelchair)?  2  -CM     Standing up from a chair using your arms (e.g., wheelchair, bedside chair)?  2  -CM     Climbing 3-5 steps with a railing?  1  -CM     To walk in hospital room?  1  -CM     AM-PAC 6 Clicks Score (PT)  13  -CM     Row Name 04/06/21 1624          Functional Assessment    Outcome Measure Options  AM-PAC 6 Clicks Basic Mobility (PT)  -CM       User Key  (r) = Recorded By, (t) = Taken By, (c) = Cosigned By    Initials Name Provider Type    Jayne Burnett, PT Physical Therapist        Physical Therapy Education                 Title: PT OT SLP Therapies (Done)     Topic: Physical Therapy (Done)     Point: Mobility training (Done)     Learning Progress Summary           Patient Acceptance, E,TB, VU by  at 4/6/2021 1625                               User Key     Initials Effective Dates Name Provider Type Discipline     03/01/19 -  Jayne Flores, PT Physical Therapist PT              PT Recommendation and Plan  Planned Therapy Interventions (PT): balance training, bed mobility training, gait training, patient/family education, strengthening, postural re-education, transfer training  Plan of Care Reviewed With: patient  Outcome Summary: 74 yo male seen s/p I&D of R foot w/ partial 5th metatarsal resection. Pt is normally  able to amb on his own. Today, he needs min assist of 2 at rw to come to stand and transfer to chair. Able to pivot on LLE but could not step. Maintains nwb status for RLE well. Pt was exhausted after trasnferring to chair one time. He is not safe for home, as his wife is not able to provide the high level of care he currently needs. Pt very motivated toward recovery.. Will need IP rehab at d/c. Will follow. PPE: gloves, mask, goggles.     Time Calculation:   PT Charges     Row Name 04/06/21 1625             Time Calculation    Start Time  1330  -CM      Stop Time  1346  -CM      Time Calculation (min)  16 min  -CM      PT Received On  04/06/21  -CM      PT - Next Appointment  04/07/21  -CM      PT Goal Re-Cert Due Date  04/20/21  -CM         Time Calculation- PT    Total Timed Code Minutes- PT  8 minute(s)  -CM        User Key  (r) = Recorded By, (t) = Taken By, (c) = Cosigned By    Initials Name Provider Type     Jayne Flores, PT Physical Therapist        Therapy Charges for Today     Code Description Service Date Service Provider Modifiers Qty    25052241761 HC PT EVAL MOD COMPLEXITY 3 4/6/2021 Jayne Flores, PT GP 1    77618282715 HC PT THERAPEUTIC ACT EA 15 MIN 4/6/2021 Jayne Flores, PT GP 1          PT G-Codes  Outcome Measure Options: AM-PAC 6 Clicks Basic Mobility (PT)  AM-PAC 6 Clicks Score (PT): 13    Jayne Flores PT  4/6/2021

## 2021-04-06 NOTE — PLAN OF CARE
Goal Outcome Evaluation:  Plan of Care Reviewed With: patient     Outcome Summary: pt had i&d with partial 5th ray resection this am. c/o pain in back and right foot. up in chair with PT this afternoon. NWB to right foot, using walker for transfers.

## 2021-04-06 NOTE — PLAN OF CARE
Goal Outcome Evaluation:  Plan of Care Reviewed With: patient     Outcome Summary: 74 yo male seen s/p I&D of R foot w/ partial 5th metatarsal resection. Pt is normally able to amb on his own. Today, he needs min assist of 2 at rw to come to stand and transfer to chair. Able to pivot on LLE but could not step. Maintains nwb status for RLE well. Pt was exhausted after trasnferring to chair one time. He is not safe for home, as his wife is not able to provide the high level of care he currently needs. Pt very motivated toward recovery.. Will need IP rehab at d/c. Will follow. PPE: gloves, mask, goggles.

## 2021-04-06 NOTE — ANESTHESIA POSTPROCEDURE EVALUATION
Patient: Ro Nathan    Procedure Summary     Date: 04/06/21 Room / Location: [facility] OR 08 / [facility] MAIN OR    Anesthesia Start: 0842 Anesthesia Stop: 1006    Procedure: INCISION AND DRAINAGE OF RIGHT FOOT 5TH METATARSAL TO BONE AND PARTIAL 5TH METATARSAL RESECTION (Right Leg Lower) Diagnosis:     Surgeons: Josef Egan DPM Provider: Madi Pizarro DO    Anesthesia Type: MAC ASA Status: 4          Anesthesia Type: MAC    Vitals  Vitals Value Taken Time   BP     Temp     Pulse 72 04/06/21 1006   Resp     SpO2 100 % 04/06/21 1006   Vitals shown include unvalidated device data.        Post Anesthesia Care and Evaluation    Patient location during evaluation: PACU  Patient participation: complete - patient participated  Level of consciousness: awake  Pain scale: See nurse's notes for pain score.  Pain management: adequate  Airway patency: patent  Anesthetic complications: No anesthetic complications  PONV Status: none  Cardiovascular status: acceptable  Respiratory status: acceptable  Hydration status: acceptable    Comments: Patient seen and examined postoperatively; vital signs stable; SpO2 greater than or equal to 90%; cardiopulmonary status stable; nausea/vomiting adequately controlled; pain adequately controlled; no apparent anesthesia complications; patient discharged from anesthesia care when discharge criteria were met

## 2021-04-06 NOTE — PROGRESS NOTES
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    Family Caregiver if Needed  none    Able to Return to Prior Arrangements  yes       Resource/Environmental Concerns    Resource/Environmental Concerns  none    Transportation Concerns  car, none       Transition Planning    Patient/Family Anticipates Transition to  home with family    Transportation Anticipated  family or friend will provide       Discharge Needs Assessment    Readmission Within the Last 30 Days  no previous admission in last 30 days    Equipment Currently Used at Home  nebulizer    Concerns to be Addressed  denies needs/concerns at this time    Anticipated Changes Related to Illness  none    Equipment Needed After Discharge  none    Discharge Coordination/Progress  DC Plan: Son can provide transportation home. Current IV antibiotics.        Discharge Plan     Row Name 04/06/21 5210       Plan    Plan  DC Plan: Son can provide transportation home. Current IV antibiotics.    Plan Comments  Pt has used [employer] in the past if needed.        Continued Care and Services - Admitted Since 4/5/2021    Coordination has not been started for this encounter.     Selected Continued Care - Prior Encounters Includes selections from prior encounters from 1/5/2021 to 4/6/2021    Discharged on 1/24/2021 Admission date: 1/20/2021 - Discharge disposition: Home-Health Care [facility] [place]    Home Medical Care     Service Provider Selected Services Address Phone Fax Patient Preferred    [facility]  Health Services [address] [place] [id number] [phone] --                      Demographic Summary     Row Name 04/06/21 1529       General Information    Admission Type  observation    Arrived From  emergency department    Required Notices Provided  Observation Status Notice    Referral Source  admission list    Reason for Consult  discharge planning    Preferred Language  English     Used During This Interaction  no    General Information Comments  Spoke to pt in room.        Functional Status     Row Name 04/06/21 1529       Functional Status    Usual Activity Tolerance  good    Current Activity Tolerance  moderate       Functional Status, IADL    Medications  independent    Meal Preparation  independent    Housekeeping  independent    Laundry  independent    Shopping  independent       Mental Status    General Appearance WDL  WDL       Mental Status Summary    Recent Changes in Mental Status/Cognitive Functioning  no changes          Met with patient in room wearing PPE: mask, goggles.      Maintained distance greater than six feet and spent less than 15 minutes in the room.             Margy Serrano RN

## 2021-04-06 NOTE — BRIEF OP NOTE
INCISION AND DRAINAGE WOUND  Progress Note    Ro Nathan  4/6/2021    Pre-op Diagnosis:   Diabetic foot ulcer to the level of capsule/bone, right foot  Type 2 diabetes mellitus with peripheral neuropathy  Other acquired hammer toe, bilaterally  History of 5th metatarsal head resection, right foot  History of osteomyelitis, right foot       Post-Op Diagnosis Codes:  Diabetic foot ulcer to the level of capsule/bone, right foot  Type 2 diabetes mellitus with peripheral neuropathy  Other acquired hammer toe, bilaterally  History of 5th metatarsal head resection, right foot  History of osteomyelitis, right foot    Procedure/CPT® Codes:    1. INCISION AND DRAINAGE TO LEVEL OF BONE, RIGHT FOOT  2. PARTIAL 5TH METATARSAL RESECTION    Surgeon(s):  Josef Egan DPM    Anesthesia: General    Staff:   Circulator: Pema Whitney RN  Scrub Person: Gunjan Reynolds RN  Student Scrub Technician: Adelia Duff         Estimated Blood Loss: 10 ml    Urine Voided: * No values recorded between 4/6/2021  8:42 AM and 4/6/2021  9:45 AM *    Specimens:                Specimens     ID Source Type Tests Collected By Collected At Frozen?    1 Foot, Right Wound · ANAEROBIC CULTURE  · WOUND CULTURE   Josef Egan DPM 4/6/21 0911     Description: RIGHT FOOT WOUND     Comment: AEROBIC AND ANAEROBIC CX    A Foot, Right Tissue · TISSUE PATHOLOGY EXAM   Josef Egan DPM 4/6/21 0914 No    Description: RIGHT FIFTH METATARSAL    This specimen was not marked as sent.    B Foot, Right Tissue · TISSUE PATHOLOGY EXAM   Josef Egan DPM 4/6/21 0914 No    Description: CLEAN MARGIN RIGHT FIFTH METATARSAL    This specimen was not marked as sent.                Drains: * No LDAs found *    Findings: Initial bone resection of previous stump of fifth metatarsal had evidence of soft bone which indicated likely osteomyelitis.  This was sent to pathology for analysis.  No signs of malodor, purulence, drainage, sinus  tracts.    Complications: None      Josef Egan DPM     Date: 4/6/2021  Time: 09:50 EDT

## 2021-04-06 NOTE — OP NOTE
Operative Note   Foot and Ankle Surgery   Provider: Dr. Josef Egan DPM  Location: [facility]      Procedure:  1. INCISION AND DRAINAGE TO LEVEL OF BONE   2. PARTIAL 5TH METATARSAL RESECTION  3. INTERPRETATION OF INTRAOPERATIVE FLUOROSCOPY    Pre-op Diagnosis:   Diabetic foot ulcer to the level of capsule/bone, right foot  Type 2 diabetes mellitus with peripheral neuropathy  Other acquired hammer toe, bilaterally  History of 5th metatarsal head resection, right foot  History of osteomyelitis, right foot    Post-operative Diagnosis:   Diabetic foot ulcer to the level of capsule/bone, right foot  Type 2 diabetes mellitus with peripheral neuropathy  Other acquired hammer toe, bilaterally  History of 5th metatarsal head resection, right foot  History of osteomyelitis, right foot    Surgeon: Dr. Josef Egan DPM    Assistant: None    Anesthesia: General    Implants: Nothing was implanted during the procedure    Findings: Initial bone resection of previous stump of fifth metatarsal had evidence of soft bone which indicated likely osteomyelitis.  This was sent to pathology for analysis. Clean margin bone specimen appeared healthy.  No signs of malodor, purulence, drainage, sinus tracts.    Specimen:  Specimens      ID Source Type Tests Collected By Collected At Frozen?     1 Foot, Right Wound · ANAEROBIC CULTURE  · WOUND CULTURE    Josef Egan DPM 4/6/21 0911       Description: RIGHT FOOT WOUND      Comment: AEROBIC AND ANAEROBIC CX     A Foot, Right Tissue · TISSUE PATHOLOGY EXAM    Josef Egan DPM 4/6/21 0914 No     Description: RIGHT FIFTH METATARSAL     This specimen was not marked as sent.     B Foot, Right Tissue · TISSUE PATHOLOGY EXAM    Josef Egan DPM 4/6/21 0914 No     Description: CLEAN MARGIN RIGHT FIFTH METATARSAL     This specimen was not marked as sent.         Blood Loss: 10 ml     Complications: None    Post Op Plan: The patient is to remain strict  nonweightbearing to his right lower extremity.  Dressings remain clean, dry, intact.  Continue IV antibiotics.  Follow wound cultures and bone biopsy results.  Patient to undergo subsequent I&D with potential delayed primary closure later in the week.  Please call with questions.    Summary:    The patient was evaluated by myself and Anesthesia in the preop holding area prior to surgery. I asked the patient if they had last minute questions today and they stated that they did not.  I reminded the patient that he is at high risk for further infections and possible amputations especially if we do not get his wound healed.  Again, he did verbalize understanding. I reminded the patient that we talked about reasonable risks, complications as well as expectations. We talked about that these do include, but are not limited to, recurrent infection, increased pain, damage to vascular structures, complex regional pain syndrome, DVT, pulmonary embolism as well as anesthesia related risks. I informed the patient that I cannot guarantee 100% relief of pain and/or gain/return of function after surgery nor can I guarantee 100% eradication of infection. Furthermore, I told them that if they have continued pain and/or if complications arise after surgery we may have to address those appropriately and that could be with further procedures, which could include surgery. I also informed the patient that if recurrent infection were to occur, we will have to treat that appropriately and that could be with antibiotic treatment, hospitalization, soft tissue debridement, bone debridement which could lead to amputation and if sepsis occurs this could be life threatening. Despite these risks and after all questions were answered, the patient elected on having the procedure done. Absolutely no guarantees were given nor implied. In addition, written informed consent was obtained preoperatively in the preoperative holding suite prior to any  medication administration.    The patient was then transported from the pre-op area to the OR and placed on the operating table in a supine position. Once adequate anesthesia was given, a well-padded calf tourniquet was placed on the operative extremity. The operative extremity was then prepped and draped in the usual sterile fashion. A timeout was then performed and the patient, date of birth, allergies, antibiotics given, procedure(s) and operative site were confirmed. The operative extremity was elevated and exsanguinated the tourniquet was inflated to 225 mmHg.    Procedure 1: Incision and drainage to the level of bone, right foot (CPT: 23015)  My attention was first directed to the lateral aspect of the foot at the level of the fifth metatarsal.  A 3:1 full-thickness elliptical incision was made encompassing the wound which measured 0.5 x 0.5 x 0.4 cm to the level of capsule/bone.  This piece of tissue was then resected and passed from the operative field to the back table.  Next a periosteal incision was made over the metatarsal exposing the bone.  The distal stump of the previously resected metatarsal did appear to be soft.  However, there were no signs of necrosis, purulence, drainage, sinus tracts appreciated.     Procedure 2: Partial fifth metatarsal resection, right foot (CPT: 38501); Procedure 3: Interpretation of Intraoperative fluoroscopy (CPT: 77829)  Next, my attention was then directed to the lateral aspect of the fifth metatarsal.  Utilizing intraoperative fluoroscopy, the fifth metatarsal was mapped out.  Then, utilizing a sagittal saw, approximately 1 cm of bone was resected.  The cut was oriented in a dorsal distal to plantar proximal fashion ensuring to stay in line with the metatarsal parabola.  This piece of bone was then shelled out and passed from the operating field to the back table and sent to pathology for specimen analysis.  The area was then flushed with copious amounts of sterile  normal saline.  Next, a sagittal saw was again utilized to resect a clean margin of bone from the fifth metatarsal.  This was also passed from the operative field to the back table and sent to pathology for analysis.  Next, the incision site was then irrigated with copious amounts of sterile normal saline utilizing a pulse .  During irrigation, the tourniquet was deflated and a hyperemic response was noted to all digits of the right foot.  Healthy bleeding tissue was also visualized within the wound itself.  The wound was then temporarily closed with retention sutures utilizing 3-0 nylon suture.  The incision itself measures approximately 4 cm. The central aspect of the wound was then packed with 1/2-inch iodoform gauze.    The patient was then wrapped into a Betadine wet-to-dry dressing.The patient was then transported from the operating room to the recovery room with vital signs stable and neurovascular status intact.  The patient tolerated the procedure and anesthesia well.  After a brief stay in the recovery room, the patient will be readmitted back to the floor.  He is to remain strict nonweightbearing to his right lower extremity.  His dressings remain clean, dry, intact.  We will plan for subsequent debridement with delayed primary closure later this week.  We will tentatively plan for Thursday afternoon.        Josef Egan DPM  Foot and Ankle Surgery  [facility]  [phone]  4/6/2021  11:00 EDT    Note is dictated utilizing voice recognition software. Unfortunately this leads to occasional typographical errors. I apologize in advance if the situation occurs. If questions occur please do not hesitate to call our office.

## 2021-04-06 NOTE — H&P
Patient Care Team:  Yusuf Jones MD as PCP - General  PhiJoshua MD as Consulting Physician (Cardiology)  Deam, Moise Kathleen MD as Consulting Physician (Cardiac Electrophysiology)    Chief Conplaint  Subjective     The patient is a 75 y.o. male presents with progressive decompensation of a pre-existing right foot diabetic foot ulceration.    HPI  The patient is a 75-year-old  male with multiple comorbidities who has been seeing foot and ankle surgery and has been sensitively treated for a right diabetic foot ulceration.  He underwent a foot debridement with skin graft placement in late 2020 as well as a resection of the fifth metatarsal head.  He has been compliant with his medication regimen but did follow-up with foot and ankle surgery and was found to have some rather considerable decompensation with concerns for progressive osteomyelitis.  He was admitted and will be undergoing debridement in the operating room today.  We have initiated antimicrobial therapy.  He denies substernal chest pain and remains at baseline shortness of breath.  He denies hemoptysis edema orthopnea diaphoresis or other.  Review of Systems  Review of Systems   Constitutional: Positive for activity change.   Respiratory: Positive for shortness of breath.    Cardiovascular: Positive for leg swelling.   Gastrointestinal: Negative.    Genitourinary: Negative.    Musculoskeletal: Positive for arthralgias and back pain.   Neurological: Positive for weakness.       History  Past Medical History:   Diagnosis Date   • Alcoholic cirrhosis of liver with ascites (CMS/[facility]) 10/26/2020   • Benign hypertension with CKD (chronic kidney disease) stage III (CMS/[facility]) 10/26/2020   • Bruises easily    • CHF (congestive heart failure) (CMS/[facility])    • Chronic bronchitis with COPD (chronic obstructive pulmonary disease) (CMS/[facility]) 10/26/2020   • Chronic respiratory failure with hypoxia (CMS/[facility]) 10/26/2020   • Congenital heart defect     • Difficulty attaining erection    • Hypertension    • Low back pain    • Pacemaker    • Peripheral vascular disease due to secondary diabetes (CMS/[facility]) 10/26/2020   • Poor circulation    • Prostate cancer (CMS/[facility])    • Shortness of breath    • Sleep apnea     using CPAP    • Stage 3a chronic kidney disease (CMS/[facility]) 10/26/2020   • Stented coronary artery 12/05/2019    MICHAEL to LAD   • Type 2 diabetes, controlled, with neuropathy (CMS/[facility]) 10/26/2020    no meds   • Type 2 diabetes, controlled, with neuropathy (CMS/[facility]) 10/26/2020     Past Surgical History:   Procedure Laterality Date   • ABDOMINAL SURGERY     • APPENDECTOMY     • BONE CURETTAGE Right 1/22/2021    Procedure: Wound excision with fifth metatarsal head resection, right foot.;  Surgeon: Josef Egan DPM;  Location: [facility] MAIN OR;  Service: Podiatry;  Laterality: Right;   • BONE INCISION AND DRAINAGE Right 10/5/2020    Procedure: Incision and drainage with debridement of all nonviable soft tissue and bone with bone biopsy, right foot.;  Surgeon: Josef Egan DPM;  Location: [facility] MAIN OR;  Service: Podiatry;  Laterality: Right;   • CARDIAC CATHETERIZATION N/A 12/5/2019    Procedure: Left Heart Cath;  Surgeon: Mirza Munson MD;  Location: [facility] CATH INVASIVE LOCATION;  Service: Cardiology   • CARDIAC CATHETERIZATION N/A 12/5/2019    Procedure: Stent MICHAEL coronary;  Surgeon: Mirza Munson MD;  Location: [facility] CATH INVASIVE LOCATION;  Service: Cardiology   • CHOLECYSTECTOMY     • ELBOW PROCEDURE      left   • FOOT SURGERY      right foot   • HERNIA REPAIR     • INTERVENTIONAL RADIOLOGY PROCEDURE N/A 12/5/2019    Procedure: Intravascular Ultrasound;  Surgeon: Mirza Munson MD;  Location: [facility] CATH INVASIVE LOCATION;  Service: Cardiology   • PACEMAKER IMPLANTATION     • DC RT/LT HEART CATHETERS N/A 12/5/2019    Procedure: Percutaneous Coronary Intervention;  Surgeon: Mirza Munson MD;   Location: [facility] CATH INVASIVE LOCATION;  Service: Cardiology   • WOUND DEBRIDEMENT Right 10/29/2020    Procedure: Preparation and debridement of foot wound with application of Integra wound graft, right foot.;  Surgeon: Josef Egan DPM;  Location: [facility] MAIN OR;  Service: Podiatry;  Laterality: Right;     Family History   Problem Relation Age of Onset   • Alzheimer's disease Mother    • GI problems Father    • Heart disease Father      Social History     Tobacco Use   • Smoking status: Former Smoker     Years: 15.00     Types: Cigarettes     Start date: 1966     Quit date: 5/10/2020     Years since quittin.9   • Smokeless tobacco: Never Used   Vaping Use   • Vaping Use: Never used   Substance Use Topics   • Alcohol use: Yes     Alcohol/week: 4.0 standard drinks     Types: 4 Shots of liquor per week     Comment: maybe 4 shots per month   • Drug use: No     Allergies:  Haloperidol, Morphine, and Pantoprazole    Objective     Vital Signs  Temp:  [97.5 °F (36.4 °C)-98.4 °F (36.9 °C)] 97.5 °F (36.4 °C)  Heart Rate:  [69-79] 69  Resp:  [14-18] 16  BP: (126-144)/(73-78) 126/73      Physical Exam:   Physical Exam  Vitals and nursing note reviewed.   Constitutional:       Appearance: Normal appearance.   HENT:      Head: Normocephalic and atraumatic.   Cardiovascular:      Rate and Rhythm: Normal rate.      Pulses: Normal pulses.   Pulmonary:      Effort: No respiratory distress.      Breath sounds: Normal breath sounds.   Abdominal:      General: There is distension.      Palpations: Abdomen is soft. There is no mass.      Tenderness: There is abdominal tenderness. There is no right CVA tenderness, left CVA tenderness, guarding or rebound.      Hernia: A hernia is present.   Musculoskeletal:         General: Swelling present. No deformity or signs of injury.      Right lower leg: Edema present.      Left lower leg: Edema present.   Skin:     General: Skin is warm.      Capillary Refill: Capillary refill  takes 2 to 3 seconds.      Coloration: Skin is pale.      Findings: Lesion present. No rash.   Neurological:      Mental Status: He is alert. Mental status is at baseline.      Cranial Nerves: No cranial nerve deficit.      Sensory: Sensory deficit present.      Motor: Weakness present.      Coordination: Coordination normal.      Deep Tendon Reflexes: Reflexes normal.   Psychiatric:         Mood and Affect: Mood normal.         Behavior: Behavior normal.              Results Review:   CBC    Results from last 7 days   Lab Units 04/06/21 0304 04/05/21 2010   WBC 10*3/mm3 8.80 9.10   HEMOGLOBIN g/dL 13.2 13.4   PLATELETS 10*3/mm3 254 271     BMP   Results from last 7 days   Lab Units 04/06/21 0304 04/05/21 2010   SODIUM mmol/L 132* 132*   POTASSIUM mmol/L 4.8 5.5*   CHLORIDE mmol/L 100 99   CO2 mmol/L 23.0 24.0   BUN mg/dL 30* 34*   CREATININE mg/dL 1.19 1.31*   GLUCOSE mg/dL 232* 286*     Cr Clearance Estimated Creatinine Clearance: 75 mL/min (by C-G formula based on SCr of 1.19 mg/dL).  Coag   Results from last 7 days   Lab Units 04/06/21 0304   INR  0.96   APTT seconds 25.4     HbA1C   Lab Results   Component Value Date    HGBA1C 7.9 (H) 01/20/2021    HGBA1C 6.7 (H) 12/27/2020    HGBA1C 6.6 (H) 10/02/2020     Blood Glucose   Glucose   Date/Time Value Ref Range Status   04/05/2021 2140 285 (H) 70 - 105 mg/dL Final     Comment:     Serial Number: [id number]:  [id number]   04/05/2021 2012 289 (H) 70 - 105 mg/dL Final     Comment:     Serial Number: [id number]:  [id number]     Infection     CMP   Results from last 7 days   Lab Units 04/06/21 0304 04/05/21 2010   SODIUM mmol/L 132* 132*   POTASSIUM mmol/L 4.8 5.5*   CHLORIDE mmol/L 100 99   CO2 mmol/L 23.0 24.0   BUN mg/dL 30* 34*   CREATININE mg/dL 1.19 1.31*   GLUCOSE mg/dL 232* 286*   ALBUMIN g/dL 3.10* 3.20*   BILIRUBIN mg/dL 0.5 0.4   ALK PHOS U/L 143* 156*   AST (SGOT) U/L 14 14   ALT (SGPT) U/L 12 12     UA      Radiology(recent) XR Foot 3+  View Right    Result Date: 4/5/2021  No radiographic findings of osteomyelitis. MRI exam is more sensitive to evaluate for ostomy myelitis. Status post resection of the head of the fifth metatarsal. Osteoarthritis as described.  Electronically Signed By-Antoinette August MD On:4/5/2021 4:56 PM This report was finalized on [id number] by  Antoinette August MD.       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  Status post right foot debridement with application of skin graft 10/29/2020  Acquired bilateral hammertoes  History of fifth metatarsal head resection right foot  History of osteomyelitis right foot  3.4 cm infrarenal abdominal aortic aneurysm  History of large ventral hernia with obstruction  Opioid dependence  Hypertension associated with chronic kidney disease stage IIIa  Stage IIIa chronic kidney disease secondary to diabetic glomerulosclerosis and hypertensive nephropathy (baseline GFR 50)  Panlobular COPD with emphysema  16mm enhancing mass at the inferior pole of the right kidney  CHFrEF  Monoclonal paraproteinemia  Paroxysmal atrial fibrillation  Restless leg syndrome  Chronic hypoxic respiratory failure  Supplemental oxygen dependency  Secondary thrombophilia  Degenerative disc disease lumbosacral spine  Atherosclerotic heart disease of native coronary arteries with angina pectoris  History of prostate cancer  Mixed simple and mucopurulent chronic bronchitis  Morbid exogenous obesity  Obstructive sleep apnea with dependency upon positive pressure ventilation  Diabetic dyslipidemia  Anemia of chronic disease  Gastroesophageal reflux disease with esophagitis  Herbert's esophageal change  Liver cirrhosis  Nephrolithiasis  Nicotine dependency with nicotine use disorder.  Cigarettes  History of alcoholism  Paroxysmal atrial fibrillation  Polyneuropathy, peripheral, secondary to  alcohol and diabetes  History of nephrolithiasis  Vitamin B complex deficiency  Peripheral vascular disease  Vitamin D deficiency    Plan:  Antimicrobial therapy.  Anticipate treatment in the OR and potential additional resection of the fifth metatarsal.  Pain and glycemic control remain priorities as well as salvaging his mobility.  Expected Length of Stay 5 days    I discussed the patients findings and my recommendations with patient and nursing staff.     Yusuf Jones MD  04/06/21  07:08 EDT

## 2021-04-06 NOTE — CONSULTS
Diabetes Education    Patient Name:  Ro Nathan  YOB: 1945  MRN: [id number]  Admit Date:  4/5/2021        Patient is not on floor at this time.  Will follow up with patient at a later date for diabetes education.      Electronically signed by:  Bonnie Connor RN  04/06/21 08:56 EDT

## 2021-04-06 NOTE — PLAN OF CARE
Goal Outcome Evaluation:  Plan of Care Reviewed With: patient  Progress: no change  Outcome Summary: Patient direct admit from Podiatry office. States he is anticipating surgerical intervention, no orders for sx at this time. Ambulates independently. PO PRN pain rx. VSS, NPO pending plan of care.    Problem: Adult Inpatient Plan of Care  Goal: Absence of Hospital-Acquired Illness or Injury  Outcome: Ongoing, Progressing  Intervention: Identify and Manage Fall Risk  Recent Flowsheet Documentation  Taken 4/5/2021 2226 by Mary Peter RN  Safety Promotion/Fall Prevention: safety round/check completed  Taken 4/5/2021 2000 by Mary Peter RN  Safety Promotion/Fall Prevention: safety round/check completed  Taken 4/5/2021 1930 by Mary Peter RN  Safety Promotion/Fall Prevention: safety round/check completed  Intervention: Prevent Skin Injury  Recent Flowsheet Documentation  Taken 4/5/2021 2226 by Mary Peter RN  Body Position: position changed independently  Taken 4/5/2021 1942 by Mary Peter RN  Body Position: dangle, side of bed  Taken 4/5/2021 1930 by Mary Peter RN  Body Position: position changed independently  Intervention: Prevent and Manage VTE (venous thromboembolism) Risk  Recent Flowsheet Documentation  Taken 4/5/2021 1930 by Mary Peter RN  VTE Prevention/Management:   bilateral   sequential compression devices off   bleeding risk factor(s) identified  Intervention: Prevent Infection  Recent Flowsheet Documentation  Taken 4/5/2021 2226 by Mary Peter RN  Infection Prevention:   single patient room provided   rest/sleep promoted   personal protective equipment utilized   hand hygiene promoted  Taken 4/5/2021 2000 by Mary Peter RN  Infection Prevention:   single patient room provided   rest/sleep promoted   personal protective equipment utilized   hand hygiene promoted  Taken 4/5/2021 1930 by Mary Peter RN  Infection Prevention:   single patient room  provided   rest/sleep promoted   hand hygiene promoted   personal protective equipment utilized

## 2021-04-06 NOTE — ANESTHESIA PROCEDURE NOTES
Airway  Date/Time: 4/6/2021 8:48 AM  Airway not difficult    General Information and Staff    Patient location during procedure: OR    Indications and Patient Condition  Indications for airway management: airway protection    Preoxygenated: yes  MILS maintained throughout  Mask difficulty assessment: 0 - not attempted    Final Airway Details  Final airway type: supraglottic airway      Successful airway: classic  Size 5

## 2021-04-07 ENCOUNTER — ANESTHESIA EVENT (OUTPATIENT)
Dept: PERIOP | Facility: HOSPITAL | Age: 76
End: 2021-04-07

## 2021-04-07 PROBLEM — L97.516 ULCER OF RIGHT FOOT WITH BONE INVOLVEMENT WITHOUT EVIDENCE OF NECROSIS (HCC): Status: ACTIVE | Noted: 2021-04-05

## 2021-04-07 LAB
ANION GAP SERPL CALCULATED.3IONS-SCNC: 8 MMOL/L (ref 5–15)
BASOPHILS # BLD AUTO: 0 10*3/MM3 (ref 0–0.2)
BASOPHILS NFR BLD AUTO: 0.3 % (ref 0–1.5)
BUN SERPL-MCNC: 32 MG/DL (ref 8–23)
BUN/CREAT SERPL: 23.2 (ref 7–25)
CALCIUM SPEC-SCNC: 8.3 MG/DL (ref 8.6–10.5)
CHLORIDE SERPL-SCNC: 98 MMOL/L (ref 98–107)
CO2 SERPL-SCNC: 26 MMOL/L (ref 22–29)
CREAT SERPL-MCNC: 1.38 MG/DL (ref 0.76–1.27)
DEPRECATED RDW RBC AUTO: 49 FL (ref 37–54)
EOSINOPHIL # BLD AUTO: 0.1 10*3/MM3 (ref 0–0.4)
EOSINOPHIL NFR BLD AUTO: 0.5 % (ref 0.3–6.2)
ERYTHROCYTE [DISTWIDTH] IN BLOOD BY AUTOMATED COUNT: 15.7 % (ref 12.3–15.4)
GFR SERPL CREATININE-BSD FRML MDRD: 50 ML/MIN/1.73
GLUCOSE BLDC GLUCOMTR-MCNC: 198 MG/DL (ref 70–105)
GLUCOSE BLDC GLUCOMTR-MCNC: 248 MG/DL (ref 70–105)
GLUCOSE BLDC GLUCOMTR-MCNC: 262 MG/DL (ref 70–105)
GLUCOSE BLDC GLUCOMTR-MCNC: 296 MG/DL (ref 70–105)
GLUCOSE SERPL-MCNC: 248 MG/DL (ref 65–99)
HCT VFR BLD AUTO: 38.8 % (ref 37.5–51)
HGB BLD-MCNC: 13.3 G/DL (ref 13–17.7)
LAB AP CASE REPORT: NORMAL
LYMPHOCYTES # BLD AUTO: 1 10*3/MM3 (ref 0.7–3.1)
LYMPHOCYTES NFR BLD AUTO: 10.7 % (ref 19.6–45.3)
MCH RBC QN AUTO: 30.2 PG (ref 26.6–33)
MCHC RBC AUTO-ENTMCNC: 34.3 G/DL (ref 31.5–35.7)
MCV RBC AUTO: 87.9 FL (ref 79–97)
MONOCYTES # BLD AUTO: 0.6 10*3/MM3 (ref 0.1–0.9)
MONOCYTES NFR BLD AUTO: 6.4 % (ref 5–12)
MRSA DNA SPEC QL NAA+PROBE: NORMAL
NEUTROPHILS NFR BLD AUTO: 7.6 10*3/MM3 (ref 1.7–7)
NEUTROPHILS NFR BLD AUTO: 82.1 % (ref 42.7–76)
NRBC BLD AUTO-RTO: 0 /100 WBC (ref 0–0.2)
PATH REPORT.FINAL DX SPEC: NORMAL
PATH REPORT.GROSS SPEC: NORMAL
PLATELET # BLD AUTO: 316 10*3/MM3 (ref 140–450)
PMV BLD AUTO: 8.5 FL (ref 6–12)
POTASSIUM SERPL-SCNC: 4.7 MMOL/L (ref 3.5–5.2)
RBC # BLD AUTO: 4.42 10*6/MM3 (ref 4.14–5.8)
SARS-COV-2 RNA PNL SPEC NAA+PROBE: NOT DETECTED
SODIUM SERPL-SCNC: 132 MMOL/L (ref 136–145)
WBC # BLD AUTO: 9.3 10*3/MM3 (ref 3.4–10.8)

## 2021-04-07 PROCEDURE — 97166 OT EVAL MOD COMPLEX 45 MIN: CPT

## 2021-04-07 PROCEDURE — 85025 COMPLETE CBC W/AUTO DIFF WBC: CPT | Performed by: PODIATRIST

## 2021-04-07 PROCEDURE — 94799 UNLISTED PULMONARY SVC/PX: CPT

## 2021-04-07 PROCEDURE — 97110 THERAPEUTIC EXERCISES: CPT

## 2021-04-07 PROCEDURE — 80048 BASIC METABOLIC PNL TOTAL CA: CPT | Performed by: PODIATRIST

## 2021-04-07 PROCEDURE — 25010000002 LEVOFLOXACIN PER 250 MG: Performed by: FAMILY MEDICINE

## 2021-04-07 PROCEDURE — U0003 INFECTIOUS AGENT DETECTION BY NUCLEIC ACID (DNA OR RNA); SEVERE ACUTE RESPIRATORY SYNDROME CORONAVIRUS 2 (SARS-COV-2) (CORONAVIRUS DISEASE [COVID-19]), AMPLIFIED PROBE TECHNIQUE, MAKING USE OF HIGH THROUGHPUT TECHNOLOGIES AS DESCRIBED BY CMS-2020-01-R: HCPCS | Performed by: PODIATRIST

## 2021-04-07 PROCEDURE — 25010000002 HYDROMORPHONE PER 4 MG: Performed by: PODIATRIST

## 2021-04-07 PROCEDURE — 63710000001 INSULIN LISPRO (HUMAN) PER 5 UNITS: Performed by: PODIATRIST

## 2021-04-07 PROCEDURE — 87641 MR-STAPH DNA AMP PROBE: CPT | Performed by: PODIATRIST

## 2021-04-07 PROCEDURE — 97530 THERAPEUTIC ACTIVITIES: CPT

## 2021-04-07 PROCEDURE — 82962 GLUCOSE BLOOD TEST: CPT

## 2021-04-07 PROCEDURE — 97535 SELF CARE MNGMENT TRAINING: CPT

## 2021-04-07 RX ORDER — ISOPROPYL ALCOHOL 700 MG/ML
1 CLOTH TOPICAL DAILY
Qty: 100 EACH | Refills: 0 | Status: CANCELLED | OUTPATIENT
Start: 2021-04-07

## 2021-04-07 RX ORDER — BLOOD SUGAR DIAGNOSTIC
1 STRIP MISCELLANEOUS DAILY
Qty: 100 EACH | Refills: 0 | Status: CANCELLED | OUTPATIENT
Start: 2021-04-07

## 2021-04-07 RX ORDER — LANCETS
1 EACH MISCELLANEOUS DAILY
Qty: 100 EACH | Refills: 0 | Status: CANCELLED | OUTPATIENT
Start: 2021-04-07

## 2021-04-07 RX ORDER — LEVOFLOXACIN 5 MG/ML
750 INJECTION, SOLUTION INTRAVENOUS EVERY 24 HOURS
Status: DISCONTINUED | OUTPATIENT
Start: 2021-04-07 | End: 2021-04-09 | Stop reason: HOSPADM

## 2021-04-07 RX ADMIN — LEVOFLOXACIN 750 MG: 5 INJECTION, SOLUTION INTRAVENOUS at 11:01

## 2021-04-07 RX ADMIN — PREGABALIN 150 MG: 75 CAPSULE ORAL at 09:19

## 2021-04-07 RX ADMIN — IPRATROPIUM BROMIDE AND ALBUTEROL SULFATE 3 ML: 2.5; .5 SOLUTION RESPIRATORY (INHALATION) at 15:15

## 2021-04-07 RX ADMIN — OXYCODONE 10 MG: 5 TABLET ORAL at 14:54

## 2021-04-07 RX ADMIN — IPRATROPIUM BROMIDE AND ALBUTEROL SULFATE 3 ML: 2.5; .5 SOLUTION RESPIRATORY (INHALATION) at 11:25

## 2021-04-07 RX ADMIN — PREGABALIN 150 MG: 75 CAPSULE ORAL at 20:36

## 2021-04-07 RX ADMIN — ROPINIROLE 0.5 MG: 0.25 TABLET, FILM COATED ORAL at 20:45

## 2021-04-07 RX ADMIN — OXYCODONE 10 MG: 5 TABLET ORAL at 04:39

## 2021-04-07 RX ADMIN — METOPROLOL TARTRATE 25 MG: 25 TABLET, FILM COATED ORAL at 20:37

## 2021-04-07 RX ADMIN — HYDROMORPHONE HYDROCHLORIDE 0.25 MG: 2 INJECTION, SOLUTION INTRAMUSCULAR; INTRAVENOUS; SUBCUTANEOUS at 20:33

## 2021-04-07 RX ADMIN — HYDROMORPHONE HYDROCHLORIDE 0.25 MG: 2 INJECTION, SOLUTION INTRAMUSCULAR; INTRAVENOUS; SUBCUTANEOUS at 06:20

## 2021-04-07 RX ADMIN — LOSARTAN POTASSIUM 50 MG: 50 TABLET, FILM COATED ORAL at 04:39

## 2021-04-07 RX ADMIN — INSULIN LISPRO 2 UNITS: 100 INJECTION, SOLUTION INTRAVENOUS; SUBCUTANEOUS at 17:06

## 2021-04-07 RX ADMIN — BUDESONIDE 0.5 MG: 0.5 SUSPENSION RESPIRATORY (INHALATION) at 19:45

## 2021-04-07 RX ADMIN — ATORVASTATIN CALCIUM 40 MG: 40 TABLET, FILM COATED ORAL at 20:37

## 2021-04-07 RX ADMIN — TRAZODONE HYDROCHLORIDE 100 MG: 100 TABLET ORAL at 20:37

## 2021-04-07 RX ADMIN — OXYCODONE 10 MG: 5 TABLET ORAL at 09:19

## 2021-04-07 RX ADMIN — MIRTAZAPINE 30 MG: 15 TABLET, FILM COATED ORAL at 20:37

## 2021-04-07 RX ADMIN — ROPINIROLE 0.5 MG: 0.25 TABLET, FILM COATED ORAL at 09:19

## 2021-04-07 RX ADMIN — METOLAZONE 5 MG: 5 TABLET ORAL at 20:37

## 2021-04-07 RX ADMIN — INSULIN LISPRO 4 UNITS: 100 INJECTION, SOLUTION INTRAVENOUS; SUBCUTANEOUS at 12:25

## 2021-04-07 RX ADMIN — CLINDAMYCIN PHOSPHATE 600 MG: 600 INJECTION, SOLUTION INTRAVENOUS at 23:28

## 2021-04-07 RX ADMIN — BUDESONIDE 0.5 MG: 0.5 SUSPENSION RESPIRATORY (INHALATION) at 07:19

## 2021-04-07 RX ADMIN — INSULIN LISPRO 6 UNITS: 100 INJECTION, SOLUTION INTRAVENOUS; SUBCUTANEOUS at 07:33

## 2021-04-07 RX ADMIN — HYDROMORPHONE HYDROCHLORIDE 0.25 MG: 2 INJECTION, SOLUTION INTRAMUSCULAR; INTRAVENOUS; SUBCUTANEOUS at 16:05

## 2021-04-07 RX ADMIN — POTASSIUM CHLORIDE 10 MEQ: 750 TABLET, EXTENDED RELEASE ORAL at 20:37

## 2021-04-07 RX ADMIN — IPRATROPIUM BROMIDE AND ALBUTEROL SULFATE 3 ML: 2.5; .5 SOLUTION RESPIRATORY (INHALATION) at 07:15

## 2021-04-07 RX ADMIN — CLINDAMYCIN PHOSPHATE 600 MG: 600 INJECTION, SOLUTION INTRAVENOUS at 17:07

## 2021-04-07 RX ADMIN — CLINDAMYCIN PHOSPHATE 600 MG: 600 INJECTION, SOLUTION INTRAVENOUS at 09:19

## 2021-04-07 RX ADMIN — IPRATROPIUM BROMIDE AND ALBUTEROL SULFATE 3 ML: 2.5; .5 SOLUTION RESPIRATORY (INHALATION) at 19:45

## 2021-04-07 RX ADMIN — HYDROMORPHONE HYDROCHLORIDE 0.25 MG: 2 INJECTION, SOLUTION INTRAMUSCULAR; INTRAVENOUS; SUBCUTANEOUS at 11:02

## 2021-04-07 RX ADMIN — HYDROMORPHONE HYDROCHLORIDE 0.25 MG: 2 INJECTION, SOLUTION INTRAMUSCULAR; INTRAVENOUS; SUBCUTANEOUS at 02:18

## 2021-04-07 RX ADMIN — FUROSEMIDE 20 MG: 20 TABLET ORAL at 09:19

## 2021-04-07 RX ADMIN — POTASSIUM CHLORIDE 10 MEQ: 750 TABLET, EXTENDED RELEASE ORAL at 09:19

## 2021-04-07 NOTE — PLAN OF CARE
Goal Outcome Evaluation:  Plan of Care Reviewed With: patient     Outcome Summary: Pt is 74 yo male s/p I&D of R foot w/ partial 5th metatarsal resection, with additional I&D planned tentatively 4/8. At baseline, pt resides with spouse in one-level home with tub-shower combo. He reports significant AE pfrom previous hospitalizations. PMHx significant for CHF, Prostate CA, cirrhosis, and abdominal hernias.  Pt supine in bed upon OT arrival and agreeable to work with OT. Supine>sit with supervision, though requires Min A for positioning in bed due to NWB status fo RLE. Pt requires Mod A for sit<>stand and Max A for attempted weight shift. Does well with WB status in standing, but often forgets while in bed/seated EOB. Pt demos no UB ROM deficits and has fair+ strength in BUE. He receives Min A for LB ADLs at baseline, but now requiring Mod-Max A for ADLs.  Will require IP rehab at discharge. PPE: Gloves, mask, eyewear

## 2021-04-07 NOTE — PLAN OF CARE
Goal Outcome Evaluation:  Plan of Care Reviewed With: patient     Outcome Summary: pt still mod painful, treated with prn meds. up with x1 assist and walker. nwb on rle. going back to surgery tomorrow for wound closure.

## 2021-04-07 NOTE — PROGRESS NOTES
LOS: 2 days   Patient Care Team:  Yusuf oJnes MD as PCP - Joshua Evangelista MD as Consulting Physician (Cardiology)  Deam, Moise Kathleen MD as Consulting Physician (Cardiac Electrophysiology)    Subjective:  Pain    Objective:   Afebrile      Review of Systems:   Review of Systems   Constitutional: Positive for activity change.   Respiratory: Negative.    Cardiovascular: Negative.    Gastrointestinal: Positive for abdominal distention and abdominal pain. Negative for constipation, diarrhea and nausea.   Genitourinary: Negative.    Musculoskeletal: Positive for arthralgias, back pain, gait problem and joint swelling.   Neurological: Positive for weakness.   Psychiatric/Behavioral: Negative.            Vital Signs  Temp:  [97 °F (36.1 °C)-98.4 °F (36.9 °C)] 97.7 °F (36.5 °C)  Heart Rate:  [66-80] 71  Resp:  [9-20] 18  BP: (106-160)/(52-97) 160/97    Physical Exam:  Physical Exam  Vitals and nursing note reviewed.   Constitutional:       Appearance: Normal appearance.   Cardiovascular:      Pulses: Normal pulses.   Pulmonary:      Breath sounds: Normal breath sounds.   Abdominal:      Palpations: Abdomen is soft.   Skin:     General: Skin is warm.      Findings: Lesion present.   Neurological:      General: No focal deficit present.      Mental Status: He is alert and oriented to person, place, and time.      Sensory: Sensory deficit present.          Radiology:  XR Foot 3+ View Right    Result Date: 4/5/2021  No radiographic findings of osteomyelitis. MRI exam is more sensitive to evaluate for ostomy myelitis. Status post resection of the head of the fifth metatarsal. Osteoarthritis as described.  Electronically Signed By-Antoinette August MD On:4/5/2021 4:56 PM This report was finalized on [id number] by  Antoinette August MD.    XR Chest 1 View    Result Date: 4/6/2021  Mild left basilar atelectasis.  Electronically Signed By-Ekaterina Goins MD On:4/6/2021 8:03 AM This report was finalized on  [id number] by  Ekaterina Goins MD.         Results Review:     I reviewed the patient's new clinical results.  I reviewed the patient's new imaging results and agree with the interpretation.    Medication Review:   Scheduled Meds:atorvastatin, 40 mg, Oral, Nightly  budesonide, 0.5 mg, Nebulization, BID - RT  cefTRIAXone, 1 g, Intravenous, Q24H  clindamycin, 600 mg, Intravenous, Q8H  furosemide, 20 mg, Oral, Daily  insulin lispro, 0-9 Units, Subcutaneous, TID AC  ipratropium-albuterol, 3 mL, Nebulization, 4x Daily - RT  losartan, 50 mg, Oral, QAM  metOLazone, 5 mg, Oral, Nightly  metoprolol tartrate, 25 mg, Oral, Q12H  mirtazapine, 30 mg, Oral, Nightly  potassium chloride, 10 mEq, Oral, BID  pregabalin, 150 mg, Oral, BID  rOPINIRole, 0.5 mg, Oral, Q12H  traZODone, 100 mg, Oral, Nightly      Continuous Infusions:   PRN Meds:.dextrose  •  dextrose  •  glucagon (human recombinant)  •  HYDROmorphone  •  insulin lispro **AND** insulin lispro  •  oxyCODONE  •  oxyCODONE    Labs:    CBC    Results from last 7 days   Lab Units 04/07/21  0336 04/06/21  0304 04/05/21 2010   WBC 10*3/mm3 9.30 8.80 9.10   HEMOGLOBIN g/dL 13.3 13.2 13.4   PLATELETS 10*3/mm3 316 254 271     BMP   Results from last 7 days   Lab Units 04/07/21  0336 04/06/21  0304 04/05/21 2010   SODIUM mmol/L 132* 132* 132*   POTASSIUM mmol/L 4.7 4.8 5.5*   CHLORIDE mmol/L 98 100 99   CO2 mmol/L 26.0 23.0 24.0   BUN mg/dL 32* 30* 34*   CREATININE mg/dL 1.38* 1.19 1.31*   GLUCOSE mg/dL 248* 232* 286*     Cr Clearance Estimated Creatinine Clearance: 65.5 mL/min (A) (by C-G formula based on SCr of 1.38 mg/dL (H)).  Coag   Results from last 7 days   Lab Units 04/06/21  0304   INR  0.96   APTT seconds 25.4     HbA1C   Lab Results   Component Value Date    HGBA1C 7.9 (H) 01/20/2021    HGBA1C 6.7 (H) 12/27/2020    HGBA1C 6.6 (H) 10/02/2020     Blood Glucose   Glucose   Date/Time Value Ref Range Status   04/07/2021 0729 262 (H) 70 - 105 mg/dL Final     Comment:      Serial Number: [id number]:  [id number]   04/06/2021 2116 287 (H) 70 - 105 mg/dL Final     Comment:     Serial Number: [id number]:  [id number]   04/06/2021 1602 324 (H) 70 - 105 mg/dL Final     Comment:     Serial Number: [id number]:  [id number]   04/06/2021 1123 208 (H) 70 - 105 mg/dL Final     Comment:     Serial Number: [id number]:  [id number]   04/06/2021 0828 218 (H) 70 - 105 mg/dL Final     Comment:     Serial Number: [id number]:  [id number]   04/06/2021 0731 221 (H) 70 - 105 mg/dL Final     Comment:     Serial Number: [id number]:  [id number]   04/05/2021 2140 285 (H) 70 - 105 mg/dL Final     Comment:     Serial Number: [id number]:  [id number]   04/05/2021 2012 289 (H) 70 - 105 mg/dL Final     Comment:     Serial Number: [id number]:  [id number]     Infection   Results from last 7 days   Lab Units 04/06/21  0911 04/05/21 2010   BLOODCX   --  No growth at 24 hours  No growth at 24 hours   WOUNDCX  Scant growth (1+) Pseudomonas species*  --      CMP   Results from last 7 days   Lab Units 04/07/21  0336 04/06/21  0304 04/05/21 2010   SODIUM mmol/L 132* 132* 132*   POTASSIUM mmol/L 4.7 4.8 5.5*   CHLORIDE mmol/L 98 100 99   CO2 mmol/L 26.0 23.0 24.0   BUN mg/dL 32* 30* 34*   CREATININE mg/dL 1.38* 1.19 1.31*   GLUCOSE mg/dL 248* 232* 286*   ALBUMIN g/dL  --  3.10* 3.20*   BILIRUBIN mg/dL  --  0.5 0.4   ALK PHOS U/L  --  143* 156*   AST (SGOT) U/L  --  14 14   ALT (SGPT) U/L  --  12 12     UA    Results from last 7 days   Lab Units 04/06/21  0755   NITRITE UA  Negative   WBC UA /HPF 0-2*   BACTERIA UA /HPF None Seen   SQUAM EPITHEL UA /HPF 0-2     Radiology(recent) XR Foot 3+ View Right    Result Date: 4/5/2021  No radiographic findings of osteomyelitis. MRI exam is more sensitive to evaluate for ostomy myelitis. Status post resection of the head of the fifth metatarsal. Osteoarthritis as described.  Electronically Signed By-Antoinette August MD On:4/5/2021 4:56 PM This  report was finalized on [id number] by  Antoinette August MD.    XR Chest 1 View    Result Date: 4/6/2021  Mild left basilar atelectasis.  Electronically Signed By-Ekaterina Goins MD On:4/6/2021 8:03 AM This report was finalized on [id number] by  Ekaterina Goins MD.     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  Status post right foot debridement with application of skin graft 10/29/2020  Acquired bilateral hammertoes  History of fifth metatarsal head resection right foot  History of osteomyelitis right foot  3.4 cm infrarenal abdominal aortic aneurysm  History of large ventral hernia with obstruction  Opioid dependence  Hypertension associated with chronic kidney disease stage IIIa  Stage IIIa chronic kidney disease secondary to diabetic glomerulosclerosis and hypertensive nephropathy (baseline GFR 50)  Panlobular COPD with emphysema  16mm enhancing mass at the inferior pole of the right kidney  CHFrEF  Monoclonal paraproteinemia  Paroxysmal atrial fibrillation  Restless leg syndrome  Chronic hypoxic respiratory failure  Supplemental oxygen dependency  Secondary thrombophilia  Degenerative disc disease lumbosacral spine  Atherosclerotic heart disease of native coronary arteries with angina pectoris  History of prostate cancer  Mixed simple and mucopurulent chronic bronchitis  Morbid exogenous obesity  Obstructive sleep apnea with dependency upon positive pressure ventilation  Diabetic dyslipidemia  Anemia of chronic disease  Gastroesophageal reflux disease with esophagitis  Herbert's esophageal change  Liver cirrhosis  Nephrolithiasis  Nicotine dependency with nicotine use disorder.  Cigarettes  History of alcoholism  Paroxysmal atrial  fibrillation  Polyneuropathy, peripheral, secondary to alcohol and diabetes  History of nephrolithiasis  Vitamin B complex deficiency  Peripheral vascular disease  Vitamin D deficiency    Plan:  Postoperative pathway.  Pain control.  Inpatient rehabilitation.  We will request cobalt.          Yusuf Jones MD  04/07/21  08:08 EDT

## 2021-04-07 NOTE — PROGRESS NOTES
Foot and Ankle Surgery Progress Note    04/07/21   Provider: Dr. Josef Egan, SENA  Location: [facility]    Chief Complaint: No chief complaint on file.       Subjective:  Ro Nathan is a 75 y.o. male. Chart reviewed. No acute events overnight. He states his pain is well controlled. He states is going to rehab. He denies current or recent nausea, fever, vomiting, chills.    Allergies   Allergen Reactions   • Haloperidol Hives   • Morphine Itching   • Pantoprazole Other (See Comments)     Feels sick after receiving       No current facility-administered medications on file prior to encounter.     Current Outpatient Medications on File Prior to Encounter   Medication Sig Dispense Refill   • atorvastatin (LIPITOR) 40 MG tablet Take 1 tablet by mouth Every Night. 30 tablet 1   • budesonide (PULMICORT) 0.5 MG/2ML nebulizer solution      • furosemide (LASIX) 20 MG tablet Take 20 mg by mouth Daily. Do not take dos     • ipratropium-albuterol (DUO-NEB) 0.5-2.5 mg/3 ml nebulizer      • losartan (COZAAR) 50 MG tablet Take 50 mg by mouth Every Morning. Do not take 24 hours prior to surgery.  LD 1-4-2021 before 1245     • Lyrica 150 MG capsule Take 1 capsule by mouth 2 (Two) Times a Day. 60 capsule 2   • metOLazone (ZAROXOLYN) 5 MG tablet Take 5 mg by mouth Every Night.     • metoprolol tartrate (LOPRESSOR) 25 MG tablet Take 1 tablet by mouth Every 12 (Twelve) Hours. (Patient taking differently: Take 50 mg by mouth Every 12 (Twelve) Hours. Take dos) 60 tablet 1   • mirtazapine (REMERON) 30 MG tablet Take 30 mg by mouth Every Night.     • oxyCODONE-acetaminophen (Percocet)  MG per tablet Take 1 tablet by mouth Every 6 (Six) Hours As Needed for Moderate Pain . 120 tablet 0   • Perforomist 20 MCG/2ML nebulizer solution      • potassium chloride (K-DUR) 10 MEQ CR tablet Take 10 mEq by mouth 2 (Two) Times a Day. Take dos     • prasugrel (EFFIENT) 10 MG tablet TAKE ONE TABLET BY MOUTH DAILY 30 tablet 0  "  • traZODone (DESYREL) 100 MG tablet Take 100 mg by mouth Every Night.     • VENTOLIN  (90 Base) MCG/ACT inhaler Inhale 2 puffs Every 6 (Six) Hours As Needed for Wheezing or Shortness of Air. Use or bring dos         Objective   /87 (BP Location: Right arm, Patient Position: Lying)   Pulse 75   Temp 98.2 °F (36.8 °C) (Oral)   Resp 16   Ht 182.9 cm (72\")   Wt 130 kg (285 lb 11.5 oz)   SpO2 99%   BMI 38.75 kg/m²     Patient is in good spirits today. He is alert and oriented x3. His right lower extremity was examined. His dressing was removed. There is no change in his neurovascular status. The incision to the lateral aspect of his foot is well coapted with retention sutures intact and packing in place. There is no evidence of increased drainage, maceration, malodor, lymphangitis. There is periincisional erythema which is to be expected in this early postoperative period. There is no pain on palpation noted to the incision site. There is no drainage or purulence expressed with proximal to distal exsanguination of the foot. There is continued irreducible flexion contractures of all lesser digits. Manual muscle strength was tested and noted to be 5/5 in all groups involved. Range of motion about his ankle and all remaining joints of the foot is smooth and pain-free.      Results from last 7 days   Lab Units 04/07/21  0336   WBC 10*3/mm3 9.30   HEMOGLOBIN g/dL 13.3   HEMATOCRIT % 38.8   PLATELETS 10*3/mm3 316       Wound Culture   Date Value Ref Range Status   04/06/2021 Scant growth (1+) Pseudomonas species (A)  Preliminary        XR Foot 3+ View Right    Result Date: 4/5/2021  No radiographic findings of osteomyelitis. MRI exam is more sensitive to evaluate for ostomy myelitis. Status post resection of the head of the fifth metatarsal. Osteoarthritis as described.  Electronically Signed By-Antoinette August MD On:4/5/2021 4:56 PM This report was finalized on [id number] by  Antoinette August MD.    XR Chest " 1 View    Result Date: 4/6/2021  Mild left basilar atelectasis.  Electronically Signed By-Ekaterina Goins MD On:4/6/2021 8:03 AM This report was finalized on [id number] by  Ekaterina Goins MD.       Order Name Source Comment Collection Info Order Time   ANAEROBIC CULTURE Foot, Right AEROBIC AND ANAEROBIC CX Collected By: Josef Egan DPM 4/6/2021  9:13 AM     Release to patient   Immediate        WOUND CULTURE Foot, Right AEROBIC AND ANAEROBIC CX Collected By: Josef Egan DPM 4/6/2021  9:13 AM     Release to patient   Immediate        TISSUE PATHOLOGY EXAM Foot, Right  Collected By: Josef Egan DPM 4/6/2021  9:14 AM     Release to patient   Immediate             Assessment/Plan     Patient Active Problem List   Diagnosis   • Skin ulcer (CMS/HCC)   • Chronic low back pain   • DDD (degenerative disc disease), lumbar   • Spondylosis of lumbosacral region   • Paroxysmal atrial fibrillation (CMS/HCC)   • Chronic obstructive pulmonary disease (CMS/HCC)   • Atherosclerosis of native coronary artery of native heart with angina pectoris (CMS/HCC)   • Gastroesophageal reflux disease   • Lumbar radiculopathy   • Other hammer toe(s) (acquired), right foot   • Presence of cardiac pacemaker   • Status post coronary artery stent placement   • COPD (chronic obstructive pulmonary disease) (CMS/HCC)   • Stented coronary artery   • Small bowel obstruction due to adhesions (CMS/HCC)   • Elevated lipoprotein(a)   • Diabetic foot ulcer (CMS/HCC)   • Ventral hernia with obstruction but no gangrene   • DM type 2 with diabetic dyslipidemia (CMS/HCC)   • Peripheral vascular disease due to secondary diabetes (CMS/HCC)   • Benign hypertension with CKD (chronic kidney disease) stage III (CMS/HCC)   • Stage 3a chronic kidney disease (CMS/HCC)   • Type 2 diabetes, controlled, with neuropathy (CMS/HCC)   • Chronic bronchitis with COPD (chronic obstructive pulmonary disease) (CMS/HCC)   • Alcoholic cirrhosis of liver with  ascites (CMS/HCC)   • Chronic respiratory failure with hypoxia (CMS/HCC)   • Diabetic ulcer of right midfoot associated with type 2 diabetes mellitus (CMS/HCC)   • Dyspnea on exertion   • Chronic ulcer of right foot with necrosis of muscle (CMS/HCC)   • Ischemic ulcer of foot with fat layer exposed, right (CMS/HCC)   • Right foot ulcer (CMS/HCC)   • Arthritis   • Congestive heart failure (CMS/HCC)   • Hiatal hernia   • Presence of coronary angioplasty implant and graft   • Coronary atherosclerosis   • Chronic obstructive bronchitis (CMS/HCC)   • Degeneration of lumbar intervertebral disc   • Ischemic ulcer of foot (CMS/HCC)   • Type 2 diabetes mellitus (CMS/HCC)   • Benign hypertension   • Acquired hallux malleus   • Peripheral vascular disease due to secondary diabetes mellitus (CMS/HCC)   • Obstruction of small intestine due to peritoneal adhesion (CMS/HCC)   • Lumbosacral spondylosis   • Obstructed ventral hernia   • Foot ulceration, right, with necrosis of bone (CMS/HCC)       Diabetic foot ulcer to the level of capsule/bone, right foot  Type 2 diabetes mellitus with peripheral neuropathy  Other acquired hammer toe, bilaterally  History of 5th metatarsal head resection, right foot  History of osteomyelitis, right foot    Patient seen and examined at bedside. The plan was discussed at length with the patient. His bone biopsy results came back negative for osteomyelitis. Wound cultures growing Pseudomonas. Okay to continue levofloxacin per primary team. Would likely recommend oral antibiotic therapy upon discharge. His dressing and packing were changed at bedside today. Plan to take patient back to the operating room tomorrow for repeat incision and drainage with delayed primary closure of his right foot. He is to be n.p.o. after midnight, obtain consent. Still encourage nonweightbearing to his right lower extremity. This case was discussed with the RN. Please call with questions.    Note is dictated utilizing  voice recognition software. Unfortunately this leads to occasional typographical errors. I apologize in advance if the situation occurs. If questions occur please do not hesitate to call our office.

## 2021-04-07 NOTE — THERAPY EVALUATION
Patient Name: Ro Nathan  : 1945    MRN: [id number]                              Today's Date: 2021       Admit Date: 2021    Visit Dx:     ICD-10-CM ICD-9-CM   1. Ulcer of right foot with bone involvement without evidence of necrosis (CMS/HCC)  L97.516 707.15   2. Diabetic foot ulcer (CMS/HCC)  E11.621 250.80    L97.509 707.15     Patient Active Problem List   Diagnosis   • Skin ulcer (CMS/HCC)   • Chronic low back pain   • DDD (degenerative disc disease), lumbar   • Spondylosis of lumbosacral region   • Paroxysmal atrial fibrillation (CMS/HCC)   • Chronic obstructive pulmonary disease (CMS/HCC)   • Atherosclerosis of native coronary artery of native heart with angina pectoris (CMS/HCC)   • Gastroesophageal reflux disease   • Lumbar radiculopathy   • Other hammer toe(s) (acquired), right foot   • Presence of cardiac pacemaker   • Status post coronary artery stent placement   • COPD (chronic obstructive pulmonary disease) (CMS/HCC)   • Stented coronary artery   • Small bowel obstruction due to adhesions (CMS/HCC)   • Elevated lipoprotein(a)   • Diabetic foot ulcer (CMS/HCC)   • Ventral hernia with obstruction but no gangrene   • DM type 2 with diabetic dyslipidemia (CMS/HCC)   • Peripheral vascular disease due to secondary diabetes (CMS/HCC)   • Benign hypertension with CKD (chronic kidney disease) stage III (CMS/HCC)   • Stage 3a chronic kidney disease (CMS/HCC)   • Type 2 diabetes, controlled, with neuropathy (CMS/HCC)   • Chronic bronchitis with COPD (chronic obstructive pulmonary disease) (CMS/HCC)   • Alcoholic cirrhosis of liver with ascites (CMS/HCC)   • Chronic respiratory failure with hypoxia (CMS/HCC)   • Diabetic ulcer of right midfoot associated with type 2 diabetes mellitus (CMS/HCC)   • Dyspnea on exertion   • Chronic ulcer of right foot with necrosis of muscle (CMS/HCC)   • Ischemic ulcer of foot with fat layer exposed, right (CMS/HCC)   • Right foot ulcer (CMS/HCC)   •  Arthritis   • Congestive heart failure (CMS/HCC)   • Hiatal hernia   • Presence of coronary angioplasty implant and graft   • Coronary atherosclerosis   • Chronic obstructive bronchitis (CMS/HCC)   • Degeneration of lumbar intervertebral disc   • Ischemic ulcer of foot (CMS/HCC)   • Type 2 diabetes mellitus (CMS/HCC)   • Benign hypertension   • Acquired hallux malleus   • Peripheral vascular disease due to secondary diabetes mellitus (CMS/HCC)   • Obstruction of small intestine due to peritoneal adhesion (CMS/HCC)   • Lumbosacral spondylosis   • Obstructed ventral hernia   • Foot ulceration, right, with necrosis of bone (CMS/HCC)   • Ulcer of right foot with bone involvement without evidence of necrosis (CMS/HCC)     Past Medical History:   Diagnosis Date   • Alcoholic cirrhosis of liver with ascites (CMS/HCC) 10/26/2020   • Benign hypertension with CKD (chronic kidney disease) stage III (CMS/[facility]) 10/26/2020   • Bruises easily    • CHF (congestive heart failure) (CMS/[facility])    • Chronic bronchitis with COPD (chronic obstructive pulmonary disease) (CMS/[facility]) 10/26/2020   • Chronic respiratory failure with hypoxia (CMS/[facility]) 10/26/2020   • Congenital heart defect    • Difficulty attaining erection    • Hypertension    • Low back pain    • Pacemaker    • Peripheral vascular disease due to secondary diabetes (CMS/HCC) 10/26/2020   • Poor circulation    • Prostate cancer (CMS/[facility])     prostate   • Shortness of breath    • Sleep apnea     using CPAP    • Stage 3a chronic kidney disease (CMS/HCC) 10/26/2020   • Stented coronary artery 12/05/2019    MICHAEL to LAD   • Type 2 diabetes, controlled, with neuropathy (CMS/[facility]) 10/26/2020    no meds   • Type 2 diabetes, controlled, with neuropathy (CMS/[facility]) 10/26/2020     Past Surgical History:   Procedure Laterality Date   • ABDOMINAL SURGERY     • APPENDECTOMY     • BONE CURETTAGE Right 1/22/2021    Procedure: Wound excision with fifth metatarsal head resection, right foot.;  Surgeon:  Josef Egan DPM;  Location: [facility] MAIN OR;  Service: Podiatry;  Laterality: Right;   • BONE INCISION AND DRAINAGE Right 10/5/2020    Procedure: Incision and drainage with debridement of all nonviable soft tissue and bone with bone biopsy, right foot.;  Surgeon: Josef Egan DPM;  Location: [facility] MAIN OR;  Service: Podiatry;  Laterality: Right;   • CARDIAC CATHETERIZATION N/A 12/5/2019    Procedure: Left Heart Cath;  Surgeon: Mirza Munson MD;  Location: [facility] CATH INVASIVE LOCATION;  Service: Cardiology   • CARDIAC CATHETERIZATION N/A 12/5/2019    Procedure: Stent MICHAEL coronary;  Surgeon: Mirza Munson MD;  Location: [facility] CATH INVASIVE LOCATION;  Service: Cardiology   • CHOLECYSTECTOMY     • ELBOW PROCEDURE      left   • FOOT SURGERY      right foot   • HERNIA REPAIR     • INCISION AND DRAINAGE OF WOUND Right 4/6/2021    Procedure: INCISION AND DRAINAGE OF RIGHT FOOT 5TH METATARSAL TO BONE AND PARTIAL 5TH METATARSAL RESECTION;  Surgeon: Josef Egan DPM;  Location: [facility] MAIN OR;  Service: Podiatry;  Laterality: Right;   • INTERVENTIONAL RADIOLOGY PROCEDURE N/A 12/5/2019    Procedure: Intravascular Ultrasound;  Surgeon: Mirza Munson MD;  Location: [facility] CATH INVASIVE LOCATION;  Service: Cardiology   • PACEMAKER IMPLANTATION     • IA RT/LT HEART CATHETERS N/A 12/5/2019    Procedure: Percutaneous Coronary Intervention;  Surgeon: Mirza Munson MD;  Location: [facility] CATH INVASIVE LOCATION;  Service: Cardiology   • WOUND DEBRIDEMENT Right 10/29/2020    Procedure: Preparation and debridement of foot wound with application of Integra wound graft, right foot.;  Surgeon: Josef Egan DPM;  Location: [facility] MAIN OR;  Service: Podiatry;  Laterality: Right;     General Information     Row Name 04/07/21 1453          OT Time and Intention    Document Type  evaluation  -ES     Mode of Treatment  occupational therapy  -ES     Row Name 04/07/21 1453           General Information    Patient Profile Reviewed  yes  -ES     Prior Level of Function  min assist:;ADL's Spouse assists with distal LE ADLs  -ES     Existing Precautions/Restrictions  fall;non-weight bearing;right  -ES     Barriers to Rehab  medically complex  -ES     Row Name 04/07/21 1453          Living Environment    Lives With  spouse  -ES     Row Name 04/07/21 1453          Home Main Entrance    Number of Stairs, Main Entrance  none  -ES     Row Name 04/07/21 1453          Stairs Within Home, Primary    Number of Stairs, Within Home, Primary  none  -ES     Row Name 04/07/21 1453          Cognition    Orientation Status (Cognition)  oriented x 4;other (see comments)  -ES     Row Name 04/07/21 1453          Safety Issues, Functional Mobility    Safety Issues Affecting Function (Mobility)  insight into deficits/self-awareness;safety precautions follow-through/compliance;problem-solving  -ES     Impairments Affecting Function (Mobility)  balance;endurance/activity tolerance;strength;shortness of breath;pain  -ES       User Key  (r) = Recorded By, (t) = Taken By, (c) = Cosigned By    Initials Name Provider Type    ES Elenita Vasquez OT Occupational Therapist          Mobility/ADL's     Row Name 04/07/21 1455          Bed Mobility    Bed Mobility  bed mobility (all) activities  -ES     All Activities, [place] (Bed Mobility)  minimum assist (75% patient effort)  -ES     Row Name 04/07/21 1455          Transfers    Bed-Chair [place] (Transfers)  maximum assist (25% patient effort) Difficulty with weight shift  -ES     Assistive Device (Bed-Chair Transfers)  walker, front-wheeled  -ES     Sit-Stand [place] (Transfers)  moderate assist (50% patient effort)  -ES     Row Name 04/07/21 1455          Sit-Stand Transfer    Assistive Device (Sit-Stand Transfers)  walker, front-wheeled  -ES     Row Name 04/07/21 1455          Functional Mobility    Functional Mobility- Ind. Level  not tested  -ES      Row Name 04/07/21 1455          Activities of Daily Living    BADL Assessment/Intervention  upper body dressing;lower body dressing;toileting  -ES     Row Name 04/07/21 1505          Mobility    Extremity Weight-bearing Status  right lower extremity  -ES     Right Lower Extremity (Weight-bearing Status)  non weight-bearing (NWB)  -ES     Row Name 04/07/21 1455          Upper Body Dressing Assessment/Training    [place] Level (Upper Body Dressing)  set up  -ES     [facility] Name 04/07/21 1455          Lower Body Dressing Assessment/Training    [place] Level (Lower Body Dressing)  maximum assist (25% patient effort)  -ES     Comment (Lower Body Dressing)  Limited secondary to NWB precauations  -ES     [facility] Name 04/07/21 1455          Toileting Assessment/Training    Comment (Toileting)  set up for urinal, Max A for commode and clothing management  -ES       User Key  (r) = Recorded By, (t) = Taken By, (c) = Cosigned By    Initials Name Provider Type    ES Elenita Vasquez OT Occupational Therapist        Obj/Interventions     [facility] Name 04/07/21 1457          Sensory Assessment (Somatosensory)    Sensory Assessment (Somatosensory)  sensation intact  -ES     [facility] Name 04/07/21 1457          Vision Assessment/Intervention    Visual Impairment/Limitations  corrective lenses full-time  -ES     Row Name 04/07/21 1457          Range of Motion Comprehensive    General Range of Motion  no range of motion deficits identified  -ES     [facility] Name 04/07/21 1457          Strength Comprehensive (MMT)    General Manual Muscle Testing (MMT) Assessment  upper extremity strength deficits identified  -ES     Comment, General Manual Muscle Testing (MMT) Assessment  BUE 4/5  -ES     [facility] Name 04/07/21 1457          Balance    Balance Assessment  sitting static balance;sitting dynamic balance;sit to stand dynamic balance;standing static balance  -ES     Static Sitting Balance  WNL  -ES     Dynamic Sitting Balance  WNL  -ES     Static  Standing Balance  moderate impairment;mild impairment  -ES     Dynamic Standing Balance  moderate impairment  -ES     Balance Interventions  sitting;standing;supported;static  -ES       User Key  (r) = Recorded By, (t) = Taken By, (c) = Cosigned By    Initials Name Provider Type    Elenita Evans OT Occupational Therapist        Goals/Plan     Row Name 04/07/21 150          Bathing Goal 1 (OT)    Activity/Device (Bathing Goal 1, OT)  bathing skills, all  -ES     [place] Level/Cues Needed (Bathing Goal 1, OT)  contact guard assist  -ES     Time Frame (Bathing Goal 1, OT)  2 weeks  -ES     Row Name 04/07/21 1503          Dressing Goal 1 (OT)    Activity/Device (Dressing Goal 1, OT)  lower body dressing  -ES     [place]/Cues Needed (Dressing Goal 1, OT)  minimum assist (75% or more patient effort)  -ES     Time Frame (Dressing Goal 1, OT)  2 weeks  -ES     Row Name 04/07/21 1503          Toileting Goal 1 (OT)    Activity/Device (Toileting Goal 1, OT)  toileting skills, all  -ES     [place] Level/Cues Needed (Toileting Goal 1, OT)  minimum assist (75% or more patient effort)  -ES     Time Frame (Toileting Goal 1, OT)  2 weeks  -ES     Row Name 04/07/21 1508          Therapy Assessment/Plan (OT)    Planned Therapy Interventions (OT)  activity tolerance training;BADL retraining;functional balance retraining;manual therapy/joint mobilization;neuromuscular control/coordination retraining;occupation/activity based interventions;patient/caregiver education/training;strengthening exercise  -ES       User Key  (r) = Recorded By, (t) = Taken By, (c) = Cosigned By    Initials Name Provider Type    Elenita Evans OT Occupational Therapist        Clinical Impression     Row Name 04/07/21 1458          Pain Scale: Numbers Pre/Post-Treatment    Pretreatment Pain Rating  4/10  -ES     Posttreatment Pain Rating  4/10  -ES     Pain Location  back  -ES     Row Name 04/07/21 1328          Plan of Care  Review    Plan of Care Reviewed With  patient  -ES     Outcome Summary  Pt is 76 yo male s/p I&D of R foot w/ partial 5th metatarsal resection, with additional I&D planned tentatively 4/8. At baseline, pt resides with spouse in one-level home with tub-shower combo. He reports significant AE pfrom previous hospitalizations. PMHx significant for CHF, Prostate CA, cirrhosis, and abdominal hernias.  Pt supine in bed upon OT arrival and agreeable to work with OT. Supine>sit with supervision, though requires Min A for positioning in bed due to NWB status fo RLE. Pt requires Mod A for sit<>stand and Max A for attempted weight shift. Does well with WB status in standing, but often forgets while in bed/seated EOB. Pt demos no UB ROM deficits and has fair+ strength in BUE. He receives Min A for LB ADLs at baseline, but now requiring Mod-Max A for ADLs.  Will require IP rehab at discharge. PPE: Gloves, mask, eyewear  -ES     Row Name 04/07/21 1458          Therapy Assessment/Plan (OT)    Rehab Potential (OT)  good, to achieve stated therapy goals  -ES     Criteria for Skilled Therapeutic Interventions Met (OT)  yes  -ES     Therapy Frequency (OT)  3 times/wk  -ES     Row Name 04/07/21 1458          Therapy Plan Review/Discharge Plan (OT)    Anticipated Discharge Disposition (OT)  inpatient rehabilitation facility  -ES     Row Name 04/07/21 1458          Vital Signs    O2 Delivery Pre Treatment  room air  -ES     O2 Delivery Intra Treatment  room air  -ES     O2 Delivery Post Treatment  room air  -ES     Pre Patient Position  Supine  -ES     Intra Patient Position  Standing  -ES     Post Patient Position  Sitting  -ES     Row Name 04/07/21 1458          Positioning and Restraints    Pre-Treatment Position  in bed  -ES     Post Treatment Position  bed  -ES     In Bed  call light within reach;notified nsg;supine;encouraged to call for assist;exit alarm on  -ES       User Key  (r) = Recorded By, (t) = Taken By, (c) = Cosigned By     Initials Name Provider Type    Elenita Evans OT Occupational Therapist        Outcome Measures     Row Name 04/07/21 1504          How much help from another is currently needed...    Putting on and taking off regular lower body clothing?  2  -ES     Bathing (including washing, rinsing, and drying)  2  -ES     Toileting (which includes using toilet bed pan or urinal)  2  -ES     Putting on and taking off regular upper body clothing  3  -ES     Taking care of personal grooming (such as brushing teeth)  3  -ES     Eating meals  4  -ES     AM-PAC 6 Clicks Score (OT)  16  -ES     Row Name 04/07/21 1504          Functional Assessment    Outcome Measure Options  AM-PAC 6 Clicks Daily Activity (OT)  -ES       User Key  (r) = Recorded By, (t) = Taken By, (c) = Cosigned By    Initials Name Provider Type    Elenita Evans OT Occupational Therapist        Occupational Therapy Education                 Title: PT OT SLP Therapies (In Progress)     Topic: Occupational Therapy (In Progress)     Point: ADL training (Done)     Description:   Instruct learner(s) on proper safety adaptation and remediation techniques during self care or transfers.   Instruct in proper use of assistive devices.              Learning Progress Summary           Patient Acceptance, E,TB, VU,NR by  at 4/7/2021 1506                   Point: Home exercise program (Not Started)     Description:   Instruct learner(s) on appropriate technique for monitoring, assisting and/or progressing therapeutic exercises/activities.              Learner Progress:  Not documented in this visit.          Point: Precautions (Done)     Description:   Instruct learner(s) on prescribed precautions during self-care and functional transfers.              Learning Progress Summary           Patient Acceptance, E,TB, VU,NR by  at 4/7/2021 1506                   Point: Body mechanics (Done)     Description:   Instruct learner(s) on proper positioning and spine  alignment during self-care, functional mobility activities and/or exercises.              Learning Progress Summary           Patient Acceptance, E,TB, VU,NR by ES at 4/7/2021 1506                               User Key     Initials Effective Dates Name Provider Type Discipline     03/01/19 -  Elenita Vasquez OT Occupational Therapist OT              OT Recommendation and Plan  Planned Therapy Interventions (OT): activity tolerance training, BADL retraining, functional balance retraining, manual therapy/joint mobilization, neuromuscular control/coordination retraining, occupation/activity based interventions, patient/caregiver education/training, strengthening exercise  Therapy Frequency (OT): 3 times/wk  Plan of Care Review  Plan of Care Reviewed With: patient  Outcome Summary: Pt is 74 yo male s/p I&D of R foot w/ partial 5th metatarsal resection, with additional I&D planned tentatively 4/8. At baseline, pt resides with spouse in one-level home with tub-shower combo. He reports significant AE pfrom previous hospitalizations. PMHx significant for CHF, Prostate CA, cirrhosis, and abdominal hernias.  Pt supine in bed upon OT arrival and agreeable to work with OT. Supine>sit with supervision, though requires Min A for positioning in bed due to NWB status fo RLE. Pt requires Mod A for sit<>stand and Max A for attempted weight shift. Does well with WB status in standing, but often forgets while in bed/seated EOB. Pt demos no UB ROM deficits and has fair+ strength in BUE. He receives Min A for LB ADLs at baseline, but now requiring Mod-Max A for ADLs.  Will require IP rehab at discharge. PPE: Gloves, mask, eyewear     Time Calculation:   Time Calculation- OT     Row Name 04/07/21 1504             Time Calculation- OT    OT Start Time  1415  -ES      OT Stop Time  1445  -ES      OT Time Calculation (min)  30 min  -ES      Total Timed Code Minutes- OT  15 minute(s)  -ES      OT Received On  04/07/21  -ES      OT -  Next Appointment  04/09/21  -CHEMO      OT Goal Re-Cert Due Date  04/21/21  -CHEMO        User Key  (r) = Recorded By, (t) = Taken By, (c) = Cosigned By    Initials Name Provider Type    Elenita Evans OT Occupational Therapist        Therapy Charges for Today     Code Description Service Date Service Provider Modifiers Qty    [id number]  OT SELF CARE/MGMT/TRAIN EA 15 MIN 4/7/2021 Elenita Vasquez OT GO 1    [id number]  OT EVAL MOD COMPLEXITY 3 4/7/2021 Elenita Vasquez OT GO 1               Elenita Vasquez OT  4/7/2021

## 2021-04-07 NOTE — THERAPY TREATMENT NOTE
Subjective: Pt agreeable to therapeutic plan of care.    Objective:     Bed mobility - Supervision  Transfers - Min-A and with rolling walker  Ambulation - 3 feet Min-A and with rolling walker    Pain: 5 VAS  Education: Provided education on importance of mobility and skilled verbal / tactile cueing throughout intervention.     Assessment: Ro Nathan presents with functional mobility impairments which indicate the need for skilled intervention. Tolerating session today without incident. Will continue to follow and progress as tolerated.     Plan/Recommendations:   Pt would benefit from Inpatient Rehabilitation placement at discharge from facility and requires no DME at discharge.   Pt desires Inpatient Rehabilitation placement at discharge. Pt cooperative; agreeable to therapeutic recommendations and plan of care.     Basic Mobility 6-click:  Rollin = Total, A lot = 2, A little = 3; 4 = None  Supine>Sit:   1 = Total, A lot = 2, A little = 3; 4 = None   Sit>Stand with arms:  1 = Total, A lot = 2, A little = 3; 4 = None  Bed>Chair:   1 = Total, A lot = 2, A little = 3; 4 = None  Ambulate in room:  1 = Total, A lot = 2, A little = 3; 4 = None  3-5 Steps with railin = Total, A lot = 2, A little = 3; 4 = None  Score: 14    Modified [place]: 4 = Moderately severe disability (Unable to attend to own bodily needs without assistance, and unable to walk unassisted)     Post-Tx Position: Up in Chair, Alarms activated and Call light and personal items within reach  PPE: gloves, surgical mask, eyewear protection

## 2021-04-07 NOTE — PROGRESS NOTES
Continued Stay Note  KATLYN Claus     Patient Name: Ro Nathan  MRN: [id number]  Today's Date: 4/7/2021    Admit Date: 4/5/2021    Discharge Plan     Row Name 04/07/21 1335       Plan    Plan  D/C Plan: [facility] accepted pending precert. Precert started 4/7. No PASRR required.    Provided Post Acute Provider List?  N/A    N/A Provider List Comment  wants referral to [place]    Patient/Family in Agreement with Plan  yes    Plan Comments   spoke to patient at bedside wearing mask and goggles and keeping distance greater than 6 feet and spent less than 15 minutes in room. CM discussed rehab with patient and he would like referral to [place], but patient spoke to liaison and is agreeable to deductible. Precert started 4/7 per liaison. Barrier to D/C: pending repoeat I&D tomorrow, IV abx.          Expected Discharge Date and Time     Expected Discharge Date Expected Discharge Time    Apr 9, 2021             Yanelis Sultana

## 2021-04-07 NOTE — DISCHARGE PLACEMENT REQUEST
"Ro Sidhu (75 y.o. Male)     Date of Birth Social Security Number Address Home Phone MRN    1945  [address] [place] [address] [phone] [id number]    Mu-ism Marital Status          Hinduism        Admission Date Admission Type Admitting Provider Attending Provider Department, Room/Bed    4/5/21 Elective Yusuf Jones MD Heimer, Brian T, MD [facility] SURGICAL INPATIENT, 4108/1    Discharge Date Discharge Disposition Discharge Destination                       Attending Provider: Yusuf Jones MD    Allergies: Haloperidol, Morphine, Pantoprazole    Isolation: None   Infection: MRSA/History Only (12/21/20)   Code Status: CPR    Ht: 182.9 cm (72\")   Wt: 130 kg (285 lb 11.5 oz)    Admission Cmt: None   Principal Problem: None                Active Insurance as of 4/5/2021     Primary Coverage     Payor Plan Insurance Group Employer/Plan Group    HUMANA MEDICARE REPLACEMENT HU[employer]NA MEDICARE REPLACEMENT [id number]     Payor Plan Address Payor Plan Phone Number Payor Plan Fax Number Effective Dates    [address] [address] [phone]  1/1/2018 - None Entered    [facility] [address]       Subscriber Name Subscriber Birth Date Member ID       RO SIDHU 1945 [id number]                 Emergency Contacts      (Rel.) Home Phone Work Phone Mobile Phone    NAHUM LONI (Spouse) -- -- [phone]          "

## 2021-04-07 NOTE — PAT
S/w nurse Luzma SOSA and she states she has no needs for pt going to surgery 4/8.  Asked Luzma to order MRSA for today.

## 2021-04-07 NOTE — PLAN OF CARE
Goal Outcome Evaluation:  Plan of Care Reviewed With: patient  Progress: no change  Outcome Summary: Patient resting at this time. Anticipates transitioning to rehab but undecided on location per patient. IV and PO pain rx controlling pain. A x 1 from bed to chair. NWB on right foot. VSS. Plan of care ongoing.      Problem: Adult Inpatient Plan of Care  Goal: Absence of Hospital-Acquired Illness or Injury  Intervention: Identify and Manage Fall Risk  Recent Flowsheet Documentation  Taken 4/6/2021 2300 by Mary Peter RN  Safety Promotion/Fall Prevention: safety round/check completed  Taken 4/6/2021 2151 by Mary Peter RN  Safety Promotion/Fall Prevention: safety round/check completed  Taken 4/6/2021 2117 by Mary Peter RN  Safety Promotion/Fall Prevention: safety round/check completed  Taken 4/6/2021 1916 by Mary Peter RN  Safety Promotion/Fall Prevention:   safety round/check completed   activity supervised     Problem: Adult Inpatient Plan of Care  Goal: Absence of Hospital-Acquired Illness or Injury  Intervention: Prevent Skin Injury  Recent Flowsheet Documentation  Taken 4/6/2021 1916 by Mary Peter RN  Body Position: dangle, side of bed     Problem: Pain Acute  Goal: Optimal Pain Control  Outcome: Ongoing, Progressing  Intervention: Develop Pain Management Plan  Recent Flowsheet Documentation  Taken 4/6/2021 2117 by Mary Peter RN  Pain Management Interventions:   quiet environment facilitated   see MAR  Taken 4/6/2021 1916 by Mary Peter RN  Pain Management Interventions: quiet environment facilitated  Intervention: Prevent or Manage Pain  Recent Flowsheet Documentation  Taken 4/6/2021 1916 by Mary Peter RN  Sensory Stimulation Regulation:   lighting decreased   care clustered

## 2021-04-08 ENCOUNTER — APPOINTMENT (OUTPATIENT)
Dept: PAIN MEDICINE | Facility: CLINIC | Age: 76
End: 2021-04-08

## 2021-04-08 ENCOUNTER — ANESTHESIA (OUTPATIENT)
Dept: PERIOP | Facility: HOSPITAL | Age: 76
End: 2021-04-08

## 2021-04-08 LAB
ANION GAP SERPL CALCULATED.3IONS-SCNC: 9 MMOL/L (ref 5–15)
BACTERIA SPEC AEROBE CULT: ABNORMAL
BASOPHILS # BLD AUTO: 0.1 10*3/MM3 (ref 0–0.2)
BASOPHILS NFR BLD AUTO: 0.8 % (ref 0–1.5)
BUN SERPL-MCNC: 35 MG/DL (ref 8–23)
BUN/CREAT SERPL: 22.4 (ref 7–25)
CALCIUM SPEC-SCNC: 8.8 MG/DL (ref 8.6–10.5)
CHLORIDE SERPL-SCNC: 99 MMOL/L (ref 98–107)
CO2 SERPL-SCNC: 26 MMOL/L (ref 22–29)
CREAT SERPL-MCNC: 1.56 MG/DL (ref 0.76–1.27)
DEPRECATED RDW RBC AUTO: 49.9 FL (ref 37–54)
EOSINOPHIL # BLD AUTO: 0.1 10*3/MM3 (ref 0–0.4)
EOSINOPHIL NFR BLD AUTO: 1.3 % (ref 0.3–6.2)
ERYTHROCYTE [DISTWIDTH] IN BLOOD BY AUTOMATED COUNT: 16 % (ref 12.3–15.4)
GFR SERPL CREATININE-BSD FRML MDRD: 44 ML/MIN/1.73
GLUCOSE BLDC GLUCOMTR-MCNC: 172 MG/DL (ref 70–105)
GLUCOSE BLDC GLUCOMTR-MCNC: 211 MG/DL (ref 70–105)
GLUCOSE BLDC GLUCOMTR-MCNC: 212 MG/DL (ref 70–105)
GLUCOSE BLDC GLUCOMTR-MCNC: 227 MG/DL (ref 70–105)
GLUCOSE BLDC GLUCOMTR-MCNC: 233 MG/DL (ref 70–105)
GLUCOSE SERPL-MCNC: 269 MG/DL (ref 65–99)
GRAM STN SPEC: ABNORMAL
GRAM STN SPEC: ABNORMAL
HCT VFR BLD AUTO: 39.5 % (ref 37.5–51)
HGB BLD-MCNC: 13.8 G/DL (ref 13–17.7)
LYMPHOCYTES # BLD AUTO: 1.6 10*3/MM3 (ref 0.7–3.1)
LYMPHOCYTES NFR BLD AUTO: 19.6 % (ref 19.6–45.3)
MCH RBC QN AUTO: 31.4 PG (ref 26.6–33)
MCHC RBC AUTO-ENTMCNC: 35 G/DL (ref 31.5–35.7)
MCV RBC AUTO: 89.7 FL (ref 79–97)
MONOCYTES # BLD AUTO: 0.8 10*3/MM3 (ref 0.1–0.9)
MONOCYTES NFR BLD AUTO: 9.6 % (ref 5–12)
NEUTROPHILS NFR BLD AUTO: 5.6 10*3/MM3 (ref 1.7–7)
NEUTROPHILS NFR BLD AUTO: 68.7 % (ref 42.7–76)
NRBC BLD AUTO-RTO: 0.1 /100 WBC (ref 0–0.2)
PLATELET # BLD AUTO: 282 10*3/MM3 (ref 140–450)
PMV BLD AUTO: 8.6 FL (ref 6–12)
POTASSIUM SERPL-SCNC: 5.3 MMOL/L (ref 3.5–5.2)
RBC # BLD AUTO: 4.41 10*6/MM3 (ref 4.14–5.8)
SODIUM SERPL-SCNC: 134 MMOL/L (ref 136–145)
WBC # BLD AUTO: 8.1 10*3/MM3 (ref 3.4–10.8)

## 2021-04-08 PROCEDURE — 94799 UNLISTED PULMONARY SVC/PX: CPT

## 2021-04-08 PROCEDURE — 63710000001 INSULIN LISPRO (HUMAN) PER 5 UNITS: Performed by: PODIATRIST

## 2021-04-08 PROCEDURE — 85025 COMPLETE CBC W/AUTO DIFF WBC: CPT | Performed by: FAMILY MEDICINE

## 2021-04-08 PROCEDURE — 25010000002 PROPOFOL 10 MG/ML EMULSION: Performed by: ANESTHESIOLOGY

## 2021-04-08 PROCEDURE — 25010000002 HYDROMORPHONE PER 4 MG: Performed by: PODIATRIST

## 2021-04-08 PROCEDURE — 82962 GLUCOSE BLOOD TEST: CPT

## 2021-04-08 PROCEDURE — 63710000001 INSULIN REGULAR HUMAN PER 5 UNITS: Performed by: ANESTHESIOLOGY

## 2021-04-08 PROCEDURE — 25010000002 LEVOFLOXACIN PER 250 MG: Performed by: PODIATRIST

## 2021-04-08 PROCEDURE — 80048 BASIC METABOLIC PNL TOTAL CA: CPT | Performed by: FAMILY MEDICINE

## 2021-04-08 PROCEDURE — 25010000002 ONDANSETRON PER 1 MG: Performed by: ANESTHESIOLOGY

## 2021-04-08 PROCEDURE — 0JBQ0ZZ EXCISION OF RIGHT FOOT SUBCUTANEOUS TISSUE AND FASCIA, OPEN APPROACH: ICD-10-PCS | Performed by: PODIATRIST

## 2021-04-08 RX ORDER — HYDROMORPHONE HCL 110MG/55ML
0.5 PATIENT CONTROLLED ANALGESIA SYRINGE INTRAVENOUS
Status: DISCONTINUED | OUTPATIENT
Start: 2021-04-08 | End: 2021-04-08 | Stop reason: HOSPADM

## 2021-04-08 RX ORDER — BUPIVACAINE HYDROCHLORIDE 2.5 MG/ML
INJECTION, SOLUTION EPIDURAL; INFILTRATION; INTRACAUDAL AS NEEDED
Status: DISCONTINUED | OUTPATIENT
Start: 2021-04-08 | End: 2021-04-08 | Stop reason: HOSPADM

## 2021-04-08 RX ORDER — ACETAMINOPHEN 325 MG/1
650 TABLET ORAL ONCE AS NEEDED
Status: DISCONTINUED | OUTPATIENT
Start: 2021-04-08 | End: 2021-04-08 | Stop reason: HOSPADM

## 2021-04-08 RX ORDER — PHENYLEPHRINE HCL IN 0.9% NACL 1 MG/10 ML
SYRINGE (ML) INTRAVENOUS AS NEEDED
Status: DISCONTINUED | OUTPATIENT
Start: 2021-04-08 | End: 2021-04-08 | Stop reason: SURG

## 2021-04-08 RX ORDER — SODIUM CHLORIDE 9 MG/ML
20 INJECTION, SOLUTION INTRAVENOUS ONCE
Status: COMPLETED | OUTPATIENT
Start: 2021-04-08 | End: 2021-04-08

## 2021-04-08 RX ORDER — SODIUM CHLORIDE 9 MG/ML
INJECTION, SOLUTION INTRAVENOUS CONTINUOUS PRN
Status: DISCONTINUED | OUTPATIENT
Start: 2021-04-08 | End: 2021-04-08 | Stop reason: SURG

## 2021-04-08 RX ORDER — LIDOCAINE HYDROCHLORIDE 20 MG/ML
INJECTION, SOLUTION EPIDURAL; INFILTRATION; INTRACAUDAL; PERINEURAL AS NEEDED
Status: DISCONTINUED | OUTPATIENT
Start: 2021-04-08 | End: 2021-04-08 | Stop reason: SURG

## 2021-04-08 RX ORDER — ONDANSETRON 2 MG/ML
INJECTION INTRAMUSCULAR; INTRAVENOUS AS NEEDED
Status: DISCONTINUED | OUTPATIENT
Start: 2021-04-08 | End: 2021-04-08 | Stop reason: SURG

## 2021-04-08 RX ORDER — LIDOCAINE HYDROCHLORIDE 10 MG/ML
0.5 INJECTION, SOLUTION EPIDURAL; INFILTRATION; INTRACAUDAL; PERINEURAL ONCE AS NEEDED
Status: DISCONTINUED | OUTPATIENT
Start: 2021-04-08 | End: 2021-04-08 | Stop reason: HOSPADM

## 2021-04-08 RX ORDER — FENTANYL CITRATE 50 UG/ML
50 INJECTION, SOLUTION INTRAMUSCULAR; INTRAVENOUS
Status: DISCONTINUED | OUTPATIENT
Start: 2021-04-08 | End: 2021-04-08 | Stop reason: HOSPADM

## 2021-04-08 RX ORDER — PROPOFOL 10 MG/ML
VIAL (ML) INTRAVENOUS AS NEEDED
Status: DISCONTINUED | OUTPATIENT
Start: 2021-04-08 | End: 2021-04-08 | Stop reason: SURG

## 2021-04-08 RX ORDER — ALBUTEROL SULFATE 2.5 MG/3ML
2.5 SOLUTION RESPIRATORY (INHALATION) ONCE AS NEEDED
Status: DISCONTINUED | OUTPATIENT
Start: 2021-04-08 | End: 2021-04-08 | Stop reason: HOSPADM

## 2021-04-08 RX ORDER — ACETAMINOPHEN 650 MG/1
650 SUPPOSITORY RECTAL ONCE AS NEEDED
Status: DISCONTINUED | OUTPATIENT
Start: 2021-04-08 | End: 2021-04-08 | Stop reason: HOSPADM

## 2021-04-08 RX ORDER — SODIUM CHLORIDE 0.9 % (FLUSH) 0.9 %
10 SYRINGE (ML) INJECTION EVERY 12 HOURS SCHEDULED
Status: DISCONTINUED | OUTPATIENT
Start: 2021-04-08 | End: 2021-04-08 | Stop reason: HOSPADM

## 2021-04-08 RX ORDER — ACETAMINOPHEN 650 MG
TABLET, EXTENDED RELEASE ORAL AS NEEDED
Status: DISCONTINUED | OUTPATIENT
Start: 2021-04-08 | End: 2021-04-08 | Stop reason: HOSPADM

## 2021-04-08 RX ORDER — IPRATROPIUM BROMIDE AND ALBUTEROL SULFATE 2.5; .5 MG/3ML; MG/3ML
3 SOLUTION RESPIRATORY (INHALATION) ONCE AS NEEDED
Status: DISCONTINUED | OUTPATIENT
Start: 2021-04-08 | End: 2021-04-08 | Stop reason: HOSPADM

## 2021-04-08 RX ORDER — ONDANSETRON 2 MG/ML
4 INJECTION INTRAMUSCULAR; INTRAVENOUS ONCE AS NEEDED
Status: DISCONTINUED | OUTPATIENT
Start: 2021-04-08 | End: 2021-04-08 | Stop reason: HOSPADM

## 2021-04-08 RX ORDER — SODIUM CHLORIDE, SODIUM LACTATE, POTASSIUM CHLORIDE, CALCIUM CHLORIDE 600; 310; 30; 20 MG/100ML; MG/100ML; MG/100ML; MG/100ML
9 INJECTION, SOLUTION INTRAVENOUS CONTINUOUS PRN
Status: DISCONTINUED | OUTPATIENT
Start: 2021-04-08 | End: 2021-04-09 | Stop reason: HOSPADM

## 2021-04-08 RX ORDER — SODIUM CHLORIDE 0.9 % (FLUSH) 0.9 %
10 SYRINGE (ML) INJECTION AS NEEDED
Status: DISCONTINUED | OUTPATIENT
Start: 2021-04-08 | End: 2021-04-08 | Stop reason: HOSPADM

## 2021-04-08 RX ADMIN — PROPOFOL 160 MG: 10 INJECTION, EMULSION INTRAVENOUS at 07:29

## 2021-04-08 RX ADMIN — POTASSIUM CHLORIDE 10 MEQ: 750 TABLET, EXTENDED RELEASE ORAL at 21:17

## 2021-04-08 RX ADMIN — CLINDAMYCIN PHOSPHATE 600 MG: 600 INJECTION, SOLUTION INTRAVENOUS at 07:34

## 2021-04-08 RX ADMIN — ONDANSETRON 4 MG: 2 INJECTION INTRAMUSCULAR; INTRAVENOUS at 07:40

## 2021-04-08 RX ADMIN — IPRATROPIUM BROMIDE AND ALBUTEROL SULFATE 3 ML: 2.5; .5 SOLUTION RESPIRATORY (INHALATION) at 10:42

## 2021-04-08 RX ADMIN — PREGABALIN 150 MG: 75 CAPSULE ORAL at 09:21

## 2021-04-08 RX ADMIN — TRAZODONE HYDROCHLORIDE 100 MG: 100 TABLET ORAL at 21:17

## 2021-04-08 RX ADMIN — INSULIN LISPRO 2 UNITS: 100 INJECTION, SOLUTION INTRAVENOUS; SUBCUTANEOUS at 16:30

## 2021-04-08 RX ADMIN — POTASSIUM CHLORIDE 10 MEQ: 750 TABLET, EXTENDED RELEASE ORAL at 09:22

## 2021-04-08 RX ADMIN — OXYCODONE 10 MG: 5 TABLET ORAL at 05:53

## 2021-04-08 RX ADMIN — IPRATROPIUM BROMIDE AND ALBUTEROL SULFATE 3 ML: 2.5; .5 SOLUTION RESPIRATORY (INHALATION) at 19:25

## 2021-04-08 RX ADMIN — OXYCODONE 10 MG: 5 TABLET ORAL at 16:30

## 2021-04-08 RX ADMIN — INSULIN LISPRO 2 UNITS: 100 INJECTION, SOLUTION INTRAVENOUS; SUBCUTANEOUS at 12:39

## 2021-04-08 RX ADMIN — INSULIN HUMAN 5 UNITS: 100 INJECTION, SOLUTION PARENTERAL at 07:09

## 2021-04-08 RX ADMIN — ATORVASTATIN CALCIUM 40 MG: 40 TABLET, FILM COATED ORAL at 21:17

## 2021-04-08 RX ADMIN — LIDOCAINE HYDROCHLORIDE 100 MG: 20 INJECTION, SOLUTION EPIDURAL; INFILTRATION; INTRACAUDAL; PERINEURAL at 07:29

## 2021-04-08 RX ADMIN — IPRATROPIUM BROMIDE AND ALBUTEROL SULFATE 3 ML: 2.5; .5 SOLUTION RESPIRATORY (INHALATION) at 14:50

## 2021-04-08 RX ADMIN — HYDROMORPHONE HYDROCHLORIDE 0.25 MG: 2 INJECTION, SOLUTION INTRAMUSCULAR; INTRAVENOUS; SUBCUTANEOUS at 23:08

## 2021-04-08 RX ADMIN — ROPINIROLE 0.5 MG: 0.25 TABLET, FILM COATED ORAL at 21:16

## 2021-04-08 RX ADMIN — OXYCODONE 10 MG: 5 TABLET ORAL at 21:16

## 2021-04-08 RX ADMIN — INSULIN LISPRO 4 UNITS: 100 INJECTION, SOLUTION INTRAVENOUS; SUBCUTANEOUS at 08:29

## 2021-04-08 RX ADMIN — LOSARTAN POTASSIUM 50 MG: 50 TABLET, FILM COATED ORAL at 05:54

## 2021-04-08 RX ADMIN — METOPROLOL TARTRATE 25 MG: 25 TABLET, FILM COATED ORAL at 09:22

## 2021-04-08 RX ADMIN — HYDROMORPHONE HYDROCHLORIDE 0.25 MG: 2 INJECTION, SOLUTION INTRAMUSCULAR; INTRAVENOUS; SUBCUTANEOUS at 14:00

## 2021-04-08 RX ADMIN — SODIUM CHLORIDE: 0.9 INJECTION, SOLUTION INTRAVENOUS at 07:24

## 2021-04-08 RX ADMIN — FUROSEMIDE 20 MG: 20 TABLET ORAL at 09:21

## 2021-04-08 RX ADMIN — Medication 200 MCG: at 07:37

## 2021-04-08 RX ADMIN — MIRTAZAPINE 30 MG: 15 TABLET, FILM COATED ORAL at 21:19

## 2021-04-08 RX ADMIN — PREGABALIN 150 MG: 75 CAPSULE ORAL at 21:17

## 2021-04-08 RX ADMIN — ROPINIROLE 0.5 MG: 0.25 TABLET, FILM COATED ORAL at 09:22

## 2021-04-08 RX ADMIN — Medication 200 MCG: at 07:47

## 2021-04-08 RX ADMIN — Medication 200 MCG: at 07:42

## 2021-04-08 RX ADMIN — METOPROLOL TARTRATE 25 MG: 25 TABLET, FILM COATED ORAL at 21:17

## 2021-04-08 RX ADMIN — HYDROMORPHONE HYDROCHLORIDE 0.25 MG: 2 INJECTION, SOLUTION INTRAMUSCULAR; INTRAVENOUS; SUBCUTANEOUS at 18:52

## 2021-04-08 RX ADMIN — OXYCODONE 10 MG: 5 TABLET ORAL at 00:36

## 2021-04-08 RX ADMIN — LEVOFLOXACIN 750 MG: 5 INJECTION, SOLUTION INTRAVENOUS at 09:22

## 2021-04-08 RX ADMIN — SODIUM CHLORIDE 20 ML/HR: 9 INJECTION, SOLUTION INTRAVENOUS at 07:09

## 2021-04-08 RX ADMIN — CLINDAMYCIN PHOSPHATE 600 MG: 600 INJECTION, SOLUTION INTRAVENOUS at 16:30

## 2021-04-08 RX ADMIN — HYDROMORPHONE HYDROCHLORIDE 0.25 MG: 2 INJECTION, SOLUTION INTRAMUSCULAR; INTRAVENOUS; SUBCUTANEOUS at 09:22

## 2021-04-08 RX ADMIN — HYDROMORPHONE HYDROCHLORIDE 0.25 MG: 2 INJECTION, SOLUTION INTRAMUSCULAR; INTRAVENOUS; SUBCUTANEOUS at 02:28

## 2021-04-08 RX ADMIN — OXYCODONE 10 MG: 5 TABLET ORAL at 12:39

## 2021-04-08 RX ADMIN — METOLAZONE 5 MG: 5 TABLET ORAL at 21:17

## 2021-04-08 RX ADMIN — BUDESONIDE 0.5 MG: 0.5 SUSPENSION RESPIRATORY (INHALATION) at 19:31

## 2021-04-08 NOTE — PROGRESS NOTES
LOS: 3 days   Patient Care Team:  Yusuf Jones MD as PCP - General  Joshua Yip MD as Consulting Physician (Cardiology)  Deam, Moise Kathleen MD as Consulting Physician (Cardiac Electrophysiology)    Subjective: Seen postoperatively, pain in his foot is well controlled.  Still has his chronic back pain but improving now that he is able to take his p.o. pain medications.    Objective:   Afebrile, appears comfortable, alert and very cooperative      Review of Systems:   Review of Systems   Constitutional: Positive for activity change.   Respiratory: Negative.    Cardiovascular: Negative.    Gastrointestinal: Negative for abdominal distention, abdominal pain, constipation, diarrhea and nausea.   Genitourinary: Negative.    Musculoskeletal: Positive for arthralgias, back pain, gait problem and joint swelling.   Neurological: Positive for weakness.   Psychiatric/Behavioral: Negative.            Vital Signs  Temp:  [96.9 °F (36.1 °C)-98.2 °F (36.8 °C)] 97.6 °F (36.4 °C)  Heart Rate:  [70-80] 73  Resp:  [12-18] 16  BP: (102-154)/(53-91) 124/78    Physical Exam:  Physical Exam  Vitals and nursing note reviewed.   Constitutional:       Appearance: Normal appearance.   Cardiovascular:      Pulses: Normal pulses.   Pulmonary:      Breath sounds: Normal breath sounds.   Abdominal:      General: There is no distension.      Palpations: Abdomen is soft.      Tenderness: There is no abdominal tenderness. There is no guarding or rebound.      Hernia: A hernia is present.   Skin:     General: Skin is warm.      Capillary Refill: Capillary refill takes 2 to 3 seconds.      Coloration: Skin is not jaundiced or pale.      Findings: Lesion present. No rash.   Neurological:      General: No focal deficit present.      Mental Status: He is alert and oriented to person, place, and time.      Sensory: Sensory deficit present.          Radiology:  XR Foot 3+ View Right    Result Date: 4/5/2021  No radiographic findings  of osteomyelitis. MRI exam is more sensitive to evaluate for ostomy myelitis. Status post resection of the head of the fifth metatarsal. Osteoarthritis as described.  Electronically Signed By-Antoinette August MD On:4/5/2021 4:56 PM This report was finalized on [id number] by  Antoinette August MD.    XR Chest 1 View    Result Date: 4/6/2021  Mild left basilar atelectasis.  Electronically Signed By-Ekaterina Goins MD On:4/6/2021 8:03 AM This report was finalized on [id number] by  Ekaterina Goins MD.         Results Review:     I reviewed the patient's new clinical results.  I reviewed the patient's new imaging results and agree with the interpretation.    Medication Review:   Scheduled Meds:atorvastatin, 40 mg, Oral, Nightly  budesonide, 0.5 mg, Nebulization, BID - RT  clindamycin, 600 mg, Intravenous, Q8H  furosemide, 20 mg, Oral, Daily  insulin lispro, 0-9 Units, Subcutaneous, TID AC  ipratropium-albuterol, 3 mL, Nebulization, 4x Daily - RT  levoFLOXacin, 750 mg, Intravenous, Q24H  losartan, 50 mg, Oral, QAM  metOLazone, 5 mg, Oral, Nightly  metoprolol tartrate, 25 mg, Oral, Q12H  mirtazapine, 30 mg, Oral, Nightly  potassium chloride, 10 mEq, Oral, BID  pregabalin, 150 mg, Oral, BID  rOPINIRole, 0.5 mg, Oral, Q12H  traZODone, 100 mg, Oral, Nightly      Continuous Infusions:lactated ringers, 9 mL/hr      PRN Meds:.dextrose  •  dextrose  •  glucagon (human recombinant)  •  HYDROmorphone  •  insulin lispro **AND** insulin lispro  •  lactated ringers  •  oxyCODONE  •  oxyCODONE    Labs:    CBC    Results from last 7 days   Lab Units 04/08/21  0259 04/07/21  0336 04/06/21  0304 04/05/21 2010   WBC 10*3/mm3 8.10 9.30 8.80 9.10   HEMOGLOBIN g/dL 13.8 13.3 13.2 13.4   PLATELETS 10*3/mm3 282 316 254 271     BMP   Results from last 7 days   Lab Units 04/08/21  0259 04/07/21  0336 04/06/21  0304 04/05/21 2010   SODIUM mmol/L 134* 132* 132* 132*   POTASSIUM mmol/L 5.3* 4.7 4.8 5.5*   CHLORIDE mmol/L 99 98 100 99   CO2 mmol/L 26.0  26.0 23.0 24.0   BUN mg/dL 35* 32* 30* 34*   CREATININE mg/dL 1.56* 1.38* 1.19 1.31*   GLUCOSE mg/dL 269* 248* 232* 286*     Cr Clearance Estimated Creatinine Clearance: 57.9 mL/min (A) (by C-G formula based on SCr of 1.56 mg/dL (H)).  Coag   Results from last 7 days   Lab Units 04/06/21  0304   INR  0.96   APTT seconds 25.4     HbA1C   Lab Results   Component Value Date    HGBA1C 7.9 (H) 01/20/2021    HGBA1C 6.7 (H) 12/27/2020    HGBA1C 6.6 (H) 10/02/2020     Blood Glucose   Glucose   Date/Time Value Ref Range Status   04/08/2021 0825 233 (H) 70 - 105 mg/dL Final     Comment:     Serial Number: [id number]:  [id number]   04/08/2021 0638 227 (H) 70 - 105 mg/dL Final     Comment:     Serial Number: [id number]:  [id number]   04/07/2021 2044 296 (H) 70 - 105 mg/dL Final     Comment:     Serial Number: [id number]:  [id number]   04/07/2021 1616 198 (H) 70 - 105 mg/dL Final     Comment:     Serial Number: [id number]:  [id number]   04/07/2021 1131 248 (H) 70 - 105 mg/dL Final     Comment:     Serial Number: [id number]:  [id number]   04/07/2021 0729 262 (H) 70 - 105 mg/dL Final     Comment:     Serial Number: [id number]:  [id number]   04/06/2021 2116 287 (H) 70 - 105 mg/dL Final     Comment:     Serial Number: [id number]:  [id number]   04/06/2021 1602 324 (H) 70 - 105 mg/dL Final     Comment:     Serial Number: [id number]:  [id number]     Infection   Results from last 7 days   Lab Units 04/06/21  0911 04/05/21 2010   BLOODCX   --  No growth at 2 days  No growth at 2 days   WOUNDCX  Scant growth (1+) Pseudomonas aeruginosa*  --      CMP   Results from last 7 days   Lab Units 04/08/21  0259 04/07/21  0336 04/06/21  0304 04/05/21 2010   SODIUM mmol/L 134* 132* 132* 132*   POTASSIUM mmol/L 5.3* 4.7 4.8 5.5*   CHLORIDE mmol/L 99 98 100 99   CO2 mmol/L 26.0 26.0 23.0 24.0   BUN mg/dL 35* 32* 30* 34*   CREATININE mg/dL 1.56* 1.38* 1.19 1.31*   GLUCOSE mg/dL 269* 248* 232* 286*    ALBUMIN g/dL  --   --  3.10* 3.20*   BILIRUBIN mg/dL  --   --  0.5 0.4   ALK PHOS U/L  --   --  143* 156*   AST (SGOT) U/L  --   --  14 14   ALT (SGPT) U/L  --   --  12 12     UA    Results from last 7 days   Lab Units 04/06/21  0755   NITRITE UA  Negative   WBC UA /HPF 0-2*   BACTERIA UA /HPF None Seen   SQUAM EPITHEL UA /HPF 0-2     Radiology(recent) No radiology results for the last day   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  Status post right foot debridement with application of skin graft 10/29/2020  Acquired bilateral hammertoes  History of fifth metatarsal head resection right foot  History of osteomyelitis right foot  3.4 cm infrarenal abdominal aortic aneurysm  History of large ventral hernia with obstruction  Opioid dependence  Hypertension associated with chronic kidney disease stage IIIa  Stage IIIa chronic kidney disease secondary to diabetic glomerulosclerosis and hypertensive nephropathy (baseline GFR 50)  Panlobular COPD with emphysema  16mm enhancing mass at the inferior pole of the right kidney  CHFrEF  Monoclonal paraproteinemia  Paroxysmal atrial fibrillation  Restless leg syndrome  Chronic hypoxic respiratory failure  Supplemental oxygen dependency  Secondary thrombophilia  Degenerative disc disease lumbosacral spine  Atherosclerotic heart disease of native coronary arteries with angina pectoris  History of prostate cancer  Mixed simple and mucopurulent chronic bronchitis  Morbid exogenous obesity  Obstructive sleep apnea with dependency upon positive pressure ventilation  Diabetic dyslipidemia  Anemia of chronic disease  Gastroesophageal reflux disease with esophagitis  Herbert's esophageal change  Liver  cirrhosis  Nephrolithiasis  Nicotine dependency with nicotine use disorder.  Cigarettes  History of alcoholism  Paroxysmal atrial fibrillation  Polyneuropathy, peripheral, secondary to alcohol and diabetes  History of nephrolithiasis  Vitamin B complex deficiency  Peripheral vascular disease  Vitamin D deficiency    Plan:  Postoperative pathway.  Pain control.  Inpatient rehabilitation.  We will request cobalt as patient is agreeable to rehab.          Jovana Dupont DO  04/08/21  09:55 EDT

## 2021-04-08 NOTE — PLAN OF CARE
Goal Outcome Evaluation:     Pt was out of the room this morning for another I & D of right foot and not available for PT.

## 2021-04-08 NOTE — PLAN OF CARE
Goal Outcome Evaluation:        Outcome Summary: Pt transferred back to unit post operatively. VSS. IV antibiotics continuing. Pt with continued significant pain treated and resolved with PRN po and IV orders. Plan ongoing.

## 2021-04-08 NOTE — PROGRESS NOTES
Continued Stay Note  KATLYN Claus     Patient Name: Ro Nathan  MRN: [id number]  Today's Date: 4/8/2021    Admit Date: 4/5/2021    Discharge Plan     Row Name 04/08/21 1256       Plan    Plan  [facility]- accepted, pending precert. No PASRR required. Precert started 4/7, pending.    Plan Comments  DC barriers: I&D today        Phone communication or documentation only - no physical contact with patient or family.        Manju Meyer RN

## 2021-04-08 NOTE — ANESTHESIA POSTPROCEDURE EVALUATION
Patient: Ro Nathan    Procedure Summary     Date: 04/08/21 Room / Location: [facility] OR 08 / [facility] MAIN OR    Anesthesia Start: 0724 Anesthesia Stop: 0817    Procedure: INCISION AND DRAINAGE BONE, DELAYED PRIMARY CLOSURE (Right Leg Lower) Diagnosis:       Ulcer of right foot with bone involvement without evidence of necrosis (CMS/HCC)      (Ulcer of right foot with bone involvement without evidence of necrosis (CMS/HCC) [L97.516])    Surgeons: Josef Egan DPM Provider: Ro Bhatia MD    Anesthesia Type: general ASA Status: 4          Anesthesia Type: general    Vitals  Vitals Value Taken Time   /60 04/08/21 0820   Temp     Pulse 73 04/08/21 0819   Resp     SpO2 98 % 04/08/21 0819   Vitals shown include unvalidated device data.        Post Anesthesia Care and Evaluation    Patient location during evaluation: PACU  Patient participation: complete - patient participated  Level of consciousness: awake  Pain management: adequate  Airway patency: patent  Anesthetic complications: No anesthetic complications  PONV Status: controlled  Cardiovascular status: acceptable  Respiratory status: acceptable  Hydration status: acceptable

## 2021-04-08 NOTE — PROGRESS NOTES
Continued Stay Note   Claus     Patient Name: Ro Nathan  MRN: [id number]  Today's Date: 4/8/2021    Admit Date: 4/5/2021    Discharge Plan     Row Name 04/08/21 1606       Plan    Plan  [facility] at d/c pending results of Peer to Peer. Dr. Jones will complete P2P on 4/9. No PASRR needed for [facility].    Plan Comments  SW received notification from [facility] liaison that pt's pre-cert has been denied, with an offer from [employer] to complete a peer to peer ([phone], ext. [phone]). [facility] contacted Dr. Dupont via phone call to notify and request P2P. Dr. Dupont was not in the office and requested that Dr. Jones be notified so he can complete the P2P upon his return tomorrow (4/9). [employer] sent a text message to Dr. Jones to notify of denial and request P2P. Dr. Jones is agreeable and will complete tomorrow. [employer] also collaborated with pt's nurse, Podiatrist, PT, and [place] liaison to provide supporting information to justify acute inpt rehab placement to MD. P2P contact information provided to MD as well.     Yelena Thomposn, SCOTTYW, LSW  PRN   Phone: [phone]

## 2021-04-08 NOTE — CONSULTS
Diabetes Education    Patient Name:  Ro Nathan  YOB: 1945  MRN: [id number]  Admit Date:  4/5/2021    Follow-up note: Met with pt and wife at bedside. Wife brought bs meter in for education. Instructed wife how to use the [employer] Guide Me bs meter. She performed return demo correctly after the instruction. Discussed healthy bs range. Reviewed importance of checking bs daily and rotating the times when she checks (ac and hs). Gave log book to record bs. Discussed importance of taking bs log to MD visits. Also encouraged pt/wife to contact MD regarding bs if running outside healthy range in between MD visits. Pt does have PCP. Wife verbalized understanding of all info. Pt/wife with no additional questions.      Electronically signed by:  Melissa Castro RN  04/08/21 12:20 EDT

## 2021-04-08 NOTE — ANESTHESIA PROCEDURE NOTES
Airway  Urgency: elective    Date/Time: 4/8/2021 7:31 AM  Airway not difficult    General Information and Staff    Patient location during procedure: OR  Anesthesiologist: Ro Bhatia MD    Indications and Patient Condition  Indications for airway management: airway protection    Preoxygenated: yes  MILS maintained throughout  Mask difficulty assessment: 1 - vent by mask    Final Airway Details  Final airway type: supraglottic airway      Successful airway: Size 5    Number of attempts at approach: 1  Assessment: lips, teeth, and gum same as pre-op and atraumatic intubation

## 2021-04-08 NOTE — ANESTHESIA PREPROCEDURE EVALUATION
Anesthesia Evaluation     Patient summary reviewed and Nursing notes reviewed   no history of anesthetic complications:  NPO Solid Status: > 8 hours  NPO Liquid Status: > 8 hours           Airway   Mallampati: II  TM distance: >3 FB  Neck ROM: full  No difficulty expected  Dental      Pulmonary     breath sounds clear to auscultation  (+) COPD, sleep apnea on CPAP,   Cardiovascular     ECG reviewed  Rhythm: regular  Rate: normal    (+) pacemaker pacemaker interrogated unknown, hypertension, CAD, dysrhythmias Paroxysmal Atrial Fib, CHF , PVD,       Neuro/Psych  (+) numbness,     GI/Hepatic/Renal/Endo    (+)  GERD,  liver disease, renal disease CRI, diabetes mellitus type 2 poorly controlled using insulin,     Musculoskeletal     Abdominal     Abdomen: soft.   Substance History - negative use     OB/GYN negative ob/gyn ROS         Other   arthritis,                      Anesthesia Plan    ASA 4     general     intravenous induction     Anesthetic plan, all risks, benefits, and alternatives have been provided, discussed and informed consent has been obtained with: patient.

## 2021-04-08 NOTE — BRIEF OP NOTE
INCISION AND DRAINAGE WOUND  Progress Note    Ro Nathan  4/8/2021    Pre-op Diagnosis:   Ulcer of right foot with bone involvement without evidence of necrosis (CMS/HCC) [L97.516]       Post-Op Diagnosis Codes:     * Ulcer of right foot with bone involvement without evidence of necrosis (CMS/HCC) [L97.516]    Procedure/CPT® Codes:        Procedure(s):  INCISION AND DRAINAGE BONE, RIGHT FOOT  DELAYED PRIMARY CLOSURE, RIGHT FOOT    Surgeon(s):  Josef Egan DPM    Anesthesia: Choice    Staff:   Circulator: Sarah Castano RN  Scrub Person: Brunner, Sophia         Estimated Blood Loss: minimal    Urine Voided: * No values recorded between 4/8/2021  7:24 AM and 4/8/2021  8:11 AM *    Specimens:                None          Drains: * No LDAs found *    Findings: Healthy-appearing wound. No evidence of purulence, drainage, malodor or other overt signs of infection.    Complications: None      Josef gEan DPM     Date: 4/8/2021  Time: 08:14 EDT

## 2021-04-08 NOTE — PLAN OF CARE
Goal Outcome Evaluation:     Progress: no change       Patient is alert and oriented. He has complained of lower back pain throughout the shift with medication that is ordered for pain helps according to the patient. He has been NPO since MN as ordered. VS stable. Will continue to follow current ordered plan of care.

## 2021-04-08 NOTE — OP NOTE
Operative Note   Foot and Ankle Surgery   Provider: Dr. Josef Egan DPM  Location: [facility]      Procedure:  1. INCISION AND DRAINAGE TO LEVEL OF BONE, RIGHT FOOT   2. DELAYED PRIMARY CLOSURE, RIGHT FOOT    Pre-op Diagnosis:   Ulcer of right foot with bone involvement without evidence of necrosis (CMS/[facility]) [L97.516]   Type 2 diabetes mellitus with peripheral neuropathy  Other acquired hammer toes, bilateral  History of osteomyelitis, right foot  History of metatarsal amputation, right foot    Post-operative Diagnosis:  Post-Op Diagnosis Codes:     * Ulcer of right foot with bone involvement without evidence of necrosis (CMS/[facility]) [L97.516]   Type 2 diabetes mellitus with peripheral neuropathy  Other acquired hammer toes, bilateral  History of osteomyelitis, right foot  History of metatarsal amputation, right foot    Surgeon: Dr. Josef Egan DPM    Assistant: None    Anesthesia: Choice    Implants: Nothing was implanted during the procedure    Findings: Healthy-appearing wound. No evidence of purulence, drainage, malodor or other overt signs of infection.     Specimen:  No specimens taken    Blood Loss: minimal     Complications: None    Post Op Plan: Patient to admitted back to the floor. Patient instructed to remain NWB to their right foot. Elevate for swelling control. Dressing is to remain clean, dry, and intact. I spoke with his wife and want her to bring his tall CAM boot with her and have him placed in that. No further surgery planned from my standpoint during this hospital stay.     Summary:    The patient was seen in the pre-operative holding area. I reminded the patient that we talked about reasonable risks, complications as well as expectations. We talked about that these do include, but are not limited to, recurrent infection, damage to vascular structures, complex regional pain syndrome, DVT, pulmonary embolism as well as anesthesia related risks. I informed the patient that  I cannot guarantee 100% eradication of infection after surgery. Furthermore, I told them that if they have continued pain and/or if complications arise after surgery we may have to address those appropriately and that could be with further procedures, which could include surgery. I also informed the patient that if an recurrent infection were to occur, we will have to treat that appropriately and that could be with antibiotic treatment, hospitalization, soft tissue debridement, bone debridement which could lead to further amputation and if sepsis occurs this could be life threatening. Despite these risks and after all questions were answered, the patient elected on having the procedure done. Absolutely no guarantees were given nor implied. In addition, written informed consent was obtained preoperatively in the preoperative holding suite prior to any medication administration.    The patient was then transported from the pre-op area to the OR and placed on the operating table in a supine position. Once adequate anesthesia was given the retention sutures were removed. The operative extremity was then prepped and draped in the usual sterile fashion. A timeout was then performed and the patient, date of birth, allergies, antibiotics given, procedure(s) and operative site were confirmed. A tourniquet was not used during the case.    Procedure 1: Incision and drainage to level of bone with excisional debridement of subcutaneous tissue, right foot (CPT: 70377)  My attention was directed to the lateral aspect of the foot at the level of the fifth metatarsal.  The wound site was removed of all hematoma utilizing a rongeur and curette. Next, utilizing sharp a section with a #15 blade, an excisional debridement of subcutaneous tissue was performed.  The stump of the fifth metatarsal was inspected and noted to be healthy in appearance with no signs of necrosis.  Proximal to distal exsanguination of the foot did not reveal any  purulence, drainage or other overt signs of infection. The wound site was then flushed with copious amounts of sterile normal saline.    Procedure 2: Delayed primary closure, right foot (CPT: 51500)  Next, 2-0 Monocryl suture was utilized to reapproximate the deep subcutaneous layer of the skin. The skin was then closed with a mixture of 2-0 and 3-0 nylon in simple interrupted fashion.     He was then wrapped into a betadine wet-to-dry dressing. The patient was then transported from the operating room to the recovery room with vital signs stable and neurovascular status intact.  The patient tolerated the procedure and anesthesia well.  After a brief stay in the recovery room, the patient will be readmitted back to the floor.n Please call with questions.      Josef Egan DPM  Foot and Ankle Surgery  [facility]  [phone]  4/8/2021  09:02 EDT    Note is dictated utilizing voice recognition software. Unfortunately this leads to occasional typographical errors. I apologize in advance if the situation occurs. If questions occur please do not hesitate to call our office.

## 2021-04-09 VITALS
DIASTOLIC BLOOD PRESSURE: 89 MMHG | SYSTOLIC BLOOD PRESSURE: 146 MMHG | WEIGHT: 285.72 LBS | BODY MASS INDEX: 38.7 KG/M2 | RESPIRATION RATE: 18 BRPM | OXYGEN SATURATION: 96 % | TEMPERATURE: 97.4 F | HEART RATE: 77 BPM | HEIGHT: 72 IN

## 2021-04-09 LAB
ANION GAP SERPL CALCULATED.3IONS-SCNC: 11 MMOL/L (ref 5–15)
BASOPHILS # BLD AUTO: 0.1 10*3/MM3 (ref 0–0.2)
BASOPHILS NFR BLD AUTO: 0.6 % (ref 0–1.5)
BUN SERPL-MCNC: 34 MG/DL (ref 8–23)
BUN/CREAT SERPL: 23.1 (ref 7–25)
CALCIUM SPEC-SCNC: 8.2 MG/DL (ref 8.6–10.5)
CHLORIDE SERPL-SCNC: 96 MMOL/L (ref 98–107)
CO2 SERPL-SCNC: 21 MMOL/L (ref 22–29)
CREAT SERPL-MCNC: 1.47 MG/DL (ref 0.76–1.27)
DEPRECATED RDW RBC AUTO: 49.9 FL (ref 37–54)
EOSINOPHIL # BLD AUTO: 0.2 10*3/MM3 (ref 0–0.4)
EOSINOPHIL NFR BLD AUTO: 1.6 % (ref 0.3–6.2)
ERYTHROCYTE [DISTWIDTH] IN BLOOD BY AUTOMATED COUNT: 16 % (ref 12.3–15.4)
GFR SERPL CREATININE-BSD FRML MDRD: 47 ML/MIN/1.73
GLUCOSE BLDC GLUCOMTR-MCNC: 198 MG/DL (ref 70–105)
GLUCOSE BLDC GLUCOMTR-MCNC: 257 MG/DL (ref 70–105)
GLUCOSE SERPL-MCNC: 200 MG/DL (ref 65–99)
HCT VFR BLD AUTO: 38.1 % (ref 37.5–51)
HGB BLD-MCNC: 12.9 G/DL (ref 13–17.7)
LYMPHOCYTES # BLD AUTO: 1.5 10*3/MM3 (ref 0.7–3.1)
LYMPHOCYTES NFR BLD AUTO: 14.7 % (ref 19.6–45.3)
MCH RBC QN AUTO: 30 PG (ref 26.6–33)
MCHC RBC AUTO-ENTMCNC: 33.9 G/DL (ref 31.5–35.7)
MCV RBC AUTO: 88.4 FL (ref 79–97)
MONOCYTES # BLD AUTO: 0.8 10*3/MM3 (ref 0.1–0.9)
MONOCYTES NFR BLD AUTO: 8.3 % (ref 5–12)
NEUTROPHILS NFR BLD AUTO: 7.6 10*3/MM3 (ref 1.7–7)
NEUTROPHILS NFR BLD AUTO: 74.8 % (ref 42.7–76)
NRBC BLD AUTO-RTO: 0.1 /100 WBC (ref 0–0.2)
PLATELET # BLD AUTO: 289 10*3/MM3 (ref 140–450)
PMV BLD AUTO: 8.3 FL (ref 6–12)
POTASSIUM SERPL-SCNC: 5.3 MMOL/L (ref 3.5–5.2)
RBC # BLD AUTO: 4.31 10*6/MM3 (ref 4.14–5.8)
SODIUM SERPL-SCNC: 128 MMOL/L (ref 136–145)
WBC # BLD AUTO: 10.2 10*3/MM3 (ref 3.4–10.8)

## 2021-04-09 PROCEDURE — 97530 THERAPEUTIC ACTIVITIES: CPT

## 2021-04-09 PROCEDURE — 25010000002 HYDROMORPHONE PER 4 MG: Performed by: PODIATRIST

## 2021-04-09 PROCEDURE — 80048 BASIC METABOLIC PNL TOTAL CA: CPT | Performed by: PODIATRIST

## 2021-04-09 PROCEDURE — 85025 COMPLETE CBC W/AUTO DIFF WBC: CPT | Performed by: PODIATRIST

## 2021-04-09 PROCEDURE — 94799 UNLISTED PULMONARY SVC/PX: CPT

## 2021-04-09 PROCEDURE — 25010000002 LEVOFLOXACIN PER 250 MG: Performed by: PODIATRIST

## 2021-04-09 PROCEDURE — 63710000001 INSULIN LISPRO (HUMAN) PER 5 UNITS: Performed by: PODIATRIST

## 2021-04-09 PROCEDURE — 82962 GLUCOSE BLOOD TEST: CPT

## 2021-04-09 RX ORDER — LEVOFLOXACIN 750 MG/1
750 TABLET ORAL DAILY
Qty: 5 TABLET | Refills: 0 | Status: ON HOLD | OUTPATIENT
Start: 2021-04-09 | End: 2021-05-17

## 2021-04-09 RX ORDER — ROPINIROLE 0.5 MG/1
0.5 TABLET, FILM COATED ORAL EVERY 12 HOURS SCHEDULED
Qty: 14 TABLET | Refills: 0 | Status: SHIPPED | OUTPATIENT
Start: 2021-04-09 | End: 2021-07-16

## 2021-04-09 RX ADMIN — LOSARTAN POTASSIUM 50 MG: 50 TABLET, FILM COATED ORAL at 08:28

## 2021-04-09 RX ADMIN — HYDROMORPHONE HYDROCHLORIDE 0.25 MG: 2 INJECTION, SOLUTION INTRAMUSCULAR; INTRAVENOUS; SUBCUTANEOUS at 04:12

## 2021-04-09 RX ADMIN — PREGABALIN 150 MG: 75 CAPSULE ORAL at 08:29

## 2021-04-09 RX ADMIN — HYDROMORPHONE HYDROCHLORIDE 0.25 MG: 2 INJECTION, SOLUTION INTRAMUSCULAR; INTRAVENOUS; SUBCUTANEOUS at 10:16

## 2021-04-09 RX ADMIN — CLINDAMYCIN PHOSPHATE 600 MG: 600 INJECTION, SOLUTION INTRAVENOUS at 00:45

## 2021-04-09 RX ADMIN — METOPROLOL TARTRATE 25 MG: 25 TABLET, FILM COATED ORAL at 08:28

## 2021-04-09 RX ADMIN — LEVOFLOXACIN 750 MG: 5 INJECTION, SOLUTION INTRAVENOUS at 10:16

## 2021-04-09 RX ADMIN — BUDESONIDE 0.5 MG: 0.5 SUSPENSION RESPIRATORY (INHALATION) at 06:39

## 2021-04-09 RX ADMIN — IPRATROPIUM BROMIDE AND ALBUTEROL SULFATE 3 ML: 2.5; .5 SOLUTION RESPIRATORY (INHALATION) at 10:35

## 2021-04-09 RX ADMIN — OXYCODONE 10 MG: 5 TABLET ORAL at 03:37

## 2021-04-09 RX ADMIN — INSULIN LISPRO 2 UNITS: 100 INJECTION, SOLUTION INTRAVENOUS; SUBCUTANEOUS at 08:30

## 2021-04-09 RX ADMIN — INSULIN LISPRO 6 UNITS: 100 INJECTION, SOLUTION INTRAVENOUS; SUBCUTANEOUS at 12:27

## 2021-04-09 RX ADMIN — IPRATROPIUM BROMIDE AND ALBUTEROL SULFATE 3 ML: 2.5; .5 SOLUTION RESPIRATORY (INHALATION) at 14:07

## 2021-04-09 RX ADMIN — FUROSEMIDE 20 MG: 20 TABLET ORAL at 08:28

## 2021-04-09 RX ADMIN — OXYCODONE 10 MG: 5 TABLET ORAL at 08:28

## 2021-04-09 RX ADMIN — ROPINIROLE 0.5 MG: 0.25 TABLET, FILM COATED ORAL at 08:29

## 2021-04-09 RX ADMIN — POTASSIUM CHLORIDE 10 MEQ: 750 TABLET, EXTENDED RELEASE ORAL at 08:28

## 2021-04-09 RX ADMIN — OXYCODONE 10 MG: 5 TABLET ORAL at 14:00

## 2021-04-09 RX ADMIN — CLINDAMYCIN PHOSPHATE 600 MG: 600 INJECTION, SOLUTION INTRAVENOUS at 08:30

## 2021-04-09 RX ADMIN — IPRATROPIUM BROMIDE AND ALBUTEROL SULFATE 3 ML: 2.5; .5 SOLUTION RESPIRATORY (INHALATION) at 06:39

## 2021-04-09 NOTE — PLAN OF CARE
Goal Outcome Evaluation:     Progress: no change       Patient is alert and oriented X4. He is up to the urinal at bedside. Chronic back pain he states is why he asks for ordered pain medication. Encouraged the patient to turn on to side and he said that the bed makes him feel worse on his side. Will continue to follow patient's current ordered plan of care.

## 2021-04-09 NOTE — PROGRESS NOTES
Foot and Ankle Surgery Progress Note    04/09/21   Provider: Dr. Josef Egan, SENA  Location: [facility]    Chief Complaint: No chief complaint on file.       Subjective:  oR Nathan is a 75 y.o. male. POD 1 from right foot I&D with delayed primary closure.  He states his pain is well controlled.  He denies current or recent nausea, fever, vomiting, chills.  He states that he was not accepted into rehab so he will be discharged home with home health.  He is inquiring about putting weight on his heel only while in his boot for balance purposes.  Aside from that he denies any other issues or complaints today.    Allergies   Allergen Reactions   • Haloperidol Hives   • Morphine Itching   • Pantoprazole Other (See Comments)     Feels sick after receiving       No current facility-administered medications on file prior to encounter.     Current Outpatient Medications on File Prior to Encounter   Medication Sig Dispense Refill   • atorvastatin (LIPITOR) 40 MG tablet Take 1 tablet by mouth Every Night. 30 tablet 1   • budesonide (PULMICORT) 0.5 MG/2ML nebulizer solution      • furosemide (LASIX) 20 MG tablet Take 20 mg by mouth Daily. Do not take dos     • ipratropium-albuterol (DUO-NEB) 0.5-2.5 mg/3 ml nebulizer      • losartan (COZAAR) 50 MG tablet Take 50 mg by mouth Every Morning. Do not take 24 hours prior to surgery.  LD 1-4-2021 before 1245     • Lyrica 150 MG capsule Take 1 capsule by mouth 2 (Two) Times a Day. 60 capsule 2   • metOLazone (ZAROXOLYN) 5 MG tablet Take 5 mg by mouth Every Night.     • metoprolol tartrate (LOPRESSOR) 25 MG tablet Take 1 tablet by mouth Every 12 (Twelve) Hours. (Patient taking differently: Take 50 mg by mouth Every 12 (Twelve) Hours. Take dos) 60 tablet 1   • mirtazapine (REMERON) 30 MG tablet Take 30 mg by mouth Every Night.     • oxyCODONE-acetaminophen (Percocet)  MG per tablet Take 1 tablet by mouth Every 6 (Six) Hours As Needed for Moderate Pain . 120  "tablet 0   • Perforomist 20 MCG/2ML nebulizer solution      • potassium chloride (K-DUR) 10 MEQ CR tablet Take 10 mEq by mouth 2 (Two) Times a Day. Take dos     • prasugrel (EFFIENT) 10 MG tablet TAKE ONE TABLET BY MOUTH DAILY 30 tablet 0   • traZODone (DESYREL) 100 MG tablet Take 100 mg by mouth Every Night.     • VENTOLIN  (90 Base) MCG/ACT inhaler Inhale 2 puffs Every 6 (Six) Hours As Needed for Wheezing or Shortness of Air. Use or bring dos         Objective   /89 (BP Location: Right arm, Patient Position: Lying)   Pulse 73   Temp 97.4 °F (36.3 °C) (Oral)   Resp 18   Ht 182.9 cm (72\")   Wt 130 kg (285 lb 11.5 oz)   SpO2 96%   BMI 38.75 kg/m²     Right lower extremity was examined.  There is no change in his neurovascular status.  His dressings were removed and the incision to the lateral aspect of his foot at the level of the fifth metatarsal is well coapted with sutures intact with no signs of infection or dehiscence.  There is no pain on palpation noted to the incision site.  There is no evidence of residual erythema or other signs of infection including, but not limited to, increased drainage, purulence, malodor, or other overt signs of infection.  The rest of his physical exam is unchanged since his preoperative state.      Results from last 7 days   Lab Units 04/09/21  0216   WBC 10*3/mm3 10.20   HEMOGLOBIN g/dL 12.9*   HEMATOCRIT % 38.1   PLATELETS 10*3/mm3 289       Wound Culture   Date Value Ref Range Status   04/06/2021 Scant growth (1+) Pseudomonas aeruginosa (A)  Final        XR Foot 3+ View Right    Result Date: 4/5/2021  No radiographic findings of osteomyelitis. MRI exam is more sensitive to evaluate for ostomy myelitis. Status post resection of the head of the fifth metatarsal. Osteoarthritis as described.  Electronically Signed By-Antoinette August MD On:4/5/2021 4:56 PM This report was finalized on [id number] by  Antoinette August MD.    XR Chest 1 View    Result Date: 4/6/2021  Mild " left basilar atelectasis.  Electronically Signed By-Ekaterina Goins MD On:4/6/2021 8:03 AM This report was finalized on [id number] by  Ekaterina Goins MD.       * No orders in the log *     Assessment/Plan     Patient Active Problem List   Diagnosis   • Skin ulcer (CMS/HCC)   • Chronic low back pain   • DDD (degenerative disc disease), lumbar   • Spondylosis of lumbosacral region   • Paroxysmal atrial fibrillation (CMS/HCC)   • Chronic obstructive pulmonary disease (CMS/HCC)   • Atherosclerosis of native coronary artery of native heart with angina pectoris (CMS/HCC)   • Gastroesophageal reflux disease   • Lumbar radiculopathy   • Other hammer toe(s) (acquired), right foot   • Presence of cardiac pacemaker   • Status post coronary artery stent placement   • COPD (chronic obstructive pulmonary disease) (CMS/HCC)   • Stented coronary artery   • Small bowel obstruction due to adhesions (CMS/HCC)   • Elevated lipoprotein(a)   • Diabetic foot ulcer (CMS/HCC)   • Ventral hernia with obstruction but no gangrene   • DM type 2 with diabetic dyslipidemia (CMS/HCC)   • Peripheral vascular disease due to secondary diabetes (CMS/HCC)   • Benign hypertension with CKD (chronic kidney disease) stage III (CMS/HCC)   • Stage 3a chronic kidney disease (CMS/HCC)   • Type 2 diabetes, controlled, with neuropathy (CMS/HCC)   • Chronic bronchitis with COPD (chronic obstructive pulmonary disease) (CMS/HCC)   • Alcoholic cirrhosis of liver with ascites (CMS/HCC)   • Chronic respiratory failure with hypoxia (CMS/HCC)   • Diabetic ulcer of right midfoot associated with type 2 diabetes mellitus (CMS/HCC)   • Dyspnea on exertion   • Chronic ulcer of right foot with necrosis of muscle (CMS/HCC)   • Ischemic ulcer of foot with fat layer exposed, right (CMS/HCC)   • Right foot ulcer (CMS/HCC)   • Arthritis   • Congestive heart failure (CMS/HCC)   • Hiatal hernia   • Presence of coronary angioplasty implant and graft   • Coronary atherosclerosis    • Chronic obstructive bronchitis (CMS/HCC)   • Degeneration of lumbar intervertebral disc   • Ischemic ulcer of foot (CMS/HCC)   • Type 2 diabetes mellitus (CMS/HCC)   • Benign hypertension   • Acquired hallux malleus   • Peripheral vascular disease due to secondary diabetes mellitus (CMS/HCC)   • Obstruction of small intestine due to peritoneal adhesion (CMS/HCC)   • Lumbosacral spondylosis   • Obstructed ventral hernia   • Foot ulceration, right, with necrosis of bone (CMS/HCC)   • Ulcer of right foot with bone involvement without evidence of necrosis (CMS/HCC)       Ulcer of right foot with bone involvement without evidence of necrosis (CMS/HCC) [L97.516]   Type 2 diabetes mellitus with peripheral neuropathy  Other acquired hammer toes, bilateral  History of osteomyelitis, right foot  History of metatarsal amputation, right foot    Patient seen and examined at bedside.  Patient strongly encouraged to stay off of his foot as often as possible in order to increase the chances of him healing this incision and amputation site.  However, I did explain to him that I do want him to be safe first and foremost so I will allow him to place some weight to his heel in his boot only for balance purposes.  However, I did reiterate to him that the more he is off his foot the better chance he has of healing his incision.  He did verbalize understanding.  His dressing was changed at bedside and a new Betadine wet-to-dry dressing was applied.  He was instructed to keep this clean, dry, intact until he follows up with me next Wednesday.  If it does get wet for whatever reason he was instructed to call my office immediately so he can come in early to have it changed.  He did verbalize understanding.  I did instruct him to start taking his oral antibiotics tomorrow and take them until they are all gone as directed by the primary team.  Again, he verbalized understanding.  I asked that he call sooner if anything flares up between  now and his next postoperative visit.  He verbalized understanding and is in agreement with this plan.    Note is dictated utilizing voice recognition software. Unfortunately this leads to occasional typographical errors. I apologize in advance if the situation occurs. If questions occur please do not hesitate to call our office.

## 2021-04-09 NOTE — DISCHARGE PLACEMENT REQUEST
"Ro Nathan (75 y.o. Male)     Date of Birth Social Security Number Address Home Phone MRN    1945  [address]  [place] [address] [phone] [id number]    Scientologist Marital Status          Spiritism        Admission Date Admission Type Admitting Provider Attending Provider Department, Room/Bed    4/5/21 Elective Yusuf Jones MD Heimer, Brian T, MD [facility] SURGICAL INPATIENT, 4108/1    Discharge Date Discharge Disposition Discharge Destination         Home-Health Care [facility]              Attending Provider: Yusuf Jones MD    Allergies: Haloperidol, Morphine, Pantoprazole    Isolation: None   Infection: MRSA/History Only (12/21/20)   Code Status: CPR    Ht: 182.9 cm (72\")   Wt: 130 kg (285 lb 11.5 oz)    Admission Cmt: None   Principal Problem: Ulcer of right foot with bone involvement without evidence of necrosis (CMS/[facility]) [L97.516] More...                 Active Insurance as of 4/5/2021     Primary Coverage     Payor Plan Insurance Group Employer/Plan Group    HUMANA MEDICARE REPLACEMENT HUMANA MEDICARE REPLACEMENT [id number]     Payor Plan Address Payor Plan Phone Number Payor Plan Fax Number Effective Dates    [address] [address] [phone]  1/1/2018 - None Entered    [facility], [facility] [address]       Subscriber Name Subscriber Birth Date Member ID       NAHUMRO CAMPOS 1945 [id number]                 Emergency Contacts      (Rel.) Home Phone Work Phone Mobile Phone    NAHUMLONI (Spouse) -- -- [phone]            "

## 2021-04-09 NOTE — PLAN OF CARE
Goal Outcome Evaluation:   Ro Nathan presents with ADL impairments below baseline abilities which indicate the need for continued skilled intervention while inpatient. Tolerating session today without incident. Continues to require cues for safety & to maintain NWB status to his RLE. Needs setup for basic UB ADL but max (A) for LB ADL. Continue to recommend IP rehab at d/c as this represents both a safety concern for home w/ spouse & a significant decline in his overall functioning & basic mobility skill. Will continue to follow and progress as tolerated.

## 2021-04-09 NOTE — PLAN OF CARE
Objective:   Bed mobility - Supervision  Transfers - Min-A and Assist x 2  Ambulation - 0 feet N/A or Not attempted.    Assessment: Ro Nathan presents with functional mobility impairments which indicate the need for skilled intervention. Tolerating session today without incident. Treatment somewhat limited this date to transfer from chair to bed due to pt reporting fatigue from sitting up in chair for extended time. Pt requires VC for NWB during transfer as well as UE involvement to allow RLE to remain NWB. Will continue to follow and progress as tolerated.

## 2021-04-09 NOTE — PROGRESS NOTES
"Continued Stay Note  KATLYN Devlin     Patient Name: oR Nathan  MRN: [id number]  Today's Date: 4/9/2021    Admit Date: 4/5/2021    Discharge Plan     Row Name 04/09/21 1320       Plan    Plan  D/C Plan: Home with [facility] (accepted, order placed).       Continued Stay Note  KATLYN Devlin     Patient Name: Ro Nathan  MRN: [id number]  Today's Date: 4/9/2021    Admit Date: 4/5/2021    Discharge Plan     Row Name 04/09/21 1211       Plan    Plan  D/C Plan: Home with [facility] (pending acceptance, order placed).    Plan Comments   spoke to patient at bedside wearing mask and goggles and keeping distance greater than 6 feet and spent less than 15 minutes in room. Patient states he would like to go home with home health. CM discussed other rehab options with patient and patient declines at this time. He states he wants to go home with his wife and \"give home a try.\" CM discussed DME needs-patient has w/c, RW, shower chair and grab bars at home. Patient states his wife can assist minimally at home. Podiatry at bedside and discussed plan for home. Podiatry states patient can bear weight for transfers if needed while wearing boot. Patient is agreeable to d/c plan and would like referral to [facility]-order placed, liaison notified. Patient states his wife can transport at d/c. IMM letter reviewed with patient, verbal consent obtained and declined copy.          Expected Discharge Date and Time     Expected Discharge Date Expected Discharge Time    Apr 9, 2021             Yanelis Sultana    "

## 2021-04-09 NOTE — DISCHARGE SUMMARY
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  Status post right foot debridement with application of skin graft 10/29/2020  Acquired bilateral hammertoes  History of fifth metatarsal head resection right foot  History of osteomyelitis right foot  3.4 cm infrarenal abdominal aortic aneurysm  History of large ventral hernia with obstruction  Opioid dependence  Hypertension associated with chronic kidney disease stage IIIa  Stage IIIa chronic kidney disease secondary to diabetic glomerulosclerosis and hypertensive nephropathy (baseline GFR 50)  Panlobular COPD with emphysema  16mm enhancing mass at the inferior pole of the right kidney  CHFrEF  Monoclonal paraproteinemia  Paroxysmal atrial fibrillation  Restless leg syndrome  Chronic hypoxic respiratory failure  Supplemental oxygen dependency  Secondary thrombophilia  Degenerative disc disease lumbosacral spine  Atherosclerotic heart disease of native coronary arteries with angina pectoris  History of prostate cancer  Mixed simple and mucopurulent chronic bronchitis  Morbid exogenous obesity  Obstructive sleep apnea with dependency upon positive pressure ventilation  Diabetic dyslipidemia  Anemia of chronic disease  Gastroesophageal reflux disease with esophagitis  Herbert's esophageal change  Liver cirrhosis  Nephrolithiasis  Nicotine dependency with nicotine use disorder.  Cigarettes  History of alcoholism  Paroxysmal atrial fibrillation  Polyneuropathy, peripheral, secondary to alcohol and diabetes  History of nephrolithiasis  Vitamin B complex deficiency  Peripheral vascular disease  Vitamin D deficiency    Presenting Problem/History of Present Illness  Active Hospital  Problems    Diagnosis  POA   • **Ulcer of right foot with bone involvement without evidence of necrosis (CMS/HCC) [L97.516]  Yes   • Foot ulceration, right, with necrosis of bone (CMS/HCC) [L97.514]  Yes   • Right foot ulcer (CMS/HCC) [L97.519]  Yes      Resolved Hospital Problems   No resolved problems to display.        Hospital Course  Patient is a 75 y.o. male presented with decompensation to a known diabetic foot ulceration.  He underwent incision and drainage of the right foot and had partial fifth metatarsal resection performed.  He did well in the postoperative phase and was deemed stable for discharge home to complete an oral course of antimicrobial therapy and home physical therapy.  Discharge instructions were provided by foot and ankle surgery.  We will follow-up with us via video visit within 7 days time and will follow up with foot and ankle surgery as directed.  Discharge medications will be per the discharge reconciliation.  There were no other complications throughout the patient's hospital stay    Procedures Performed    Procedure(s):  INCISION AND DRAINAGE OF RIGHT FOOT 5TH METATARSAL TO BONE AND PARTIAL 5TH METATARSAL RESECTION  -------------------    Procedure(s):  INCISION AND DRAINAGE BONE, DELAYED PRIMARY CLOSURE  -------------------       Consults:   Consults     No orders found from 3/7/2021 to 4/6/2021.          Pertinent Test Results:XR Foot 3+ View Right    Result Date: 4/5/2021  No radiographic findings of osteomyelitis. MRI exam is more sensitive to evaluate for ostomy myelitis. Status post resection of the head of the fifth metatarsal. Osteoarthritis as described.  Electronically Signed By-Antoinette August MD On:4/5/2021 4:56 PM This report was finalized on [id number] by  Antoinette August MD.    XR Chest 1 View    Result Date: 4/6/2021  Mild left basilar atelectasis.  Electronically Signed By-Ekaterina Goins MD On:4/6/2021 8:03 AM This report was finalized on [id number] by  Ekaterina  MD Briseida.      Imaging Results (Last 7 Days)     Procedure Component Value Units Date/Time    XR Foot 2 View Right [[id number]] Resulted: 04/06/21 1155     Updated: 04/06/21 1156    FL C Arm During Surgery [[id number]] Resulted: 04/06/21 1154     Updated: 04/06/21 1156    XR Chest 1 View [[id number]] Collected: 04/06/21 0803     Updated: 04/06/21 0806    Narrative:      DATE OF EXAM:  4/6/2021 7:19 AM     PROCEDURE:  XR CHEST 1 VW-     INDICATIONS:  Preoperative evaluation. Former smoker. COPD. Congestive heart failure.         COMPARISON:  AP portable chest 01/26/2021.     TECHNIQUE:   Single radiographic view of the chest was obtained.     FINDINGS:  The study is attenuated by patient body habitus. Left basilar opacity is  present, may represent passive atelectasis. The right lung appears  clear. The heart size is upper limits normal but stable. No pleural  effusion, pneumothorax, or acute osseous abnormalities are identified.  Pacemaker device appears unchanged.       Impression:      Mild left basilar atelectasis.     Electronically Signed By-Ekaterina Goins MD On:4/6/2021 8:03 AM  This report was finalized on [id number] by  Ekaterina Goins MD.    XR Foot 3+ View Right [[id number]] Collected: 04/05/21 1655     Updated: 04/05/21 1658    Narrative:      DATE OF EXAM:  4/5/2021 4:50 PM     PROCEDURE:  XR FOOT 3+ VW RIGHT-     INDICATIONS:  Recurrent foot wound lateral 5th metatarsal     COMPARISON:  Foot radiograph 01/22/2021     TECHNIQUE:   A minimum of three routine standard radiographic views were obtained of  the right foot.     FINDINGS:  Resection of the head of the fifth metatarsal is again seen. There is  bandage material present. There is flexion of the second through fifth  toes. There is no osseous lucencies sclerosis to suggest osteoarthritis  by radiograph. There is mild joint space narrowing first  metatarsophalangeal joint. There is mild to moderate joint space  narrowing of the tarsometatarsal  joints. There is mild talonavicular  joint space narrowing. There is a small plantar calcaneal spur. There is  an enthesophyte at the Achilles tendon insertion site. No acute  fractures or dislocations.        Impression:      No radiographic findings of osteomyelitis. MRI exam is more sensitive to  evaluate for ostomy myelitis.  Status post resection of the head of the fifth metatarsal.  Osteoarthritis as described.     Electronically Signed By-Antoinette August MD On:4/5/2021 4:56 PM  This report was finalized on [id number] by  Antoinette August MD.              Condition on Discharge:  Fair    Vital Signs  Temp:  [97.1 °F (36.2 °C)-97.7 °F (36.5 °C)] 97.4 °F (36.3 °C)  Heart Rate:  [68-79] 76  Resp:  [16-20] 18  BP: (108-146)/(69-89) 146/89    Physical Exam:     General Appearance:    Alert, cooperative, in no acute distress   Head:    Normocephalic, without obvious abnormality, atraumatic   Eyes:           Conjunctivae and sclerae normal, no   icterus, no pallor, corneas clear, PERRLA   Throat:   No oral lesions, no thrush, oral mucosa moist   Neck:   No adenopathy, supple, trachea midline, no thyromegaly, no   carotid bruit, no JVD   Lungs:     Clear to auscultation,respirations regular, even and                  unlabored    Heart:    Regular rhythm and normal rate, normal S1 and S2, no            murmur, no gallop, no rub, no click   Chest Wall:    No abnormalities observed   Abdomen:     Normal bowel sounds, no masses, no organomegaly, soft        non-tender, non-distended, no guarding, no rebound                tenderness   Rectal:     Deferred   Extremities:   Moves all extremities well, no edema, no cyanosis, no             redness   Pulses:   Pulses palpable and equal bilaterally   Skin:   No bleeding, bruising or rash   Lymph nodes:   No palpable adenopathy   Neurologic:   Cranial nerves 2 - 12 grossly intact, sensation intact, DTR       present and equal bilaterally         Discharge Disposition  Home-Health  Care [facility]    Discharge Medications     Discharge Medications      New Medications      Instructions Start Date   levoFLOXacin 750 MG tablet  Commonly known as: Levaquin   750 mg, Oral, Daily      rOPINIRole 0.5 MG tablet  Commonly known as: REQUIP   0.5 mg, Oral, Every 12 Hours Scheduled, Take 1 hour before bedtime.         Changes to Medications      Instructions Start Date   metoprolol tartrate 25 MG tablet  Commonly known as: LOPRESSOR  What changed:   · how much to take  · additional instructions   25 mg, Oral, Every 12 Hours Scheduled         Continue These Medications      Instructions Start Date   atorvastatin 40 MG tablet  Commonly known as: LIPITOR   40 mg, Oral, Nightly      budesonide 0.5 MG/2ML nebulizer solution  Commonly known as: PULMICORT   No dose, route, or frequency recorded.      furosemide 20 MG tablet  Commonly known as: LASIX   20 mg, Oral, Daily, Do not take dos      ipratropium-albuterol 0.5-2.5 mg/3 ml nebulizer  Commonly known as: DUO-NEB   No dose, route, or frequency recorded.      losartan 50 MG tablet  Commonly known as: COZAAR   50 mg, Oral, Every Morning, Do not take 24 hours prior to surgery.  LD 1-4-2021 before 1245      Lyrica 150 MG capsule  Generic drug: pregabalin   150 mg, Oral, 2 Times Daily      metOLazone 5 MG tablet  Commonly known as: ZAROXOLYN   5 mg, Oral, Nightly      mirtazapine 30 MG tablet  Commonly known as: REMERON   30 mg, Oral, Nightly      oxyCODONE-acetaminophen  MG per tablet  Commonly known as: Percocet   1 tablet, Oral, Every 6 Hours PRN      Perforomist 20 MCG/2ML nebulizer solution  Generic drug: formoterol   No dose, route, or frequency recorded.      potassium chloride 10 MEQ CR tablet   10 mEq, Oral, 2 Times Daily, Take dos      prasugrel 10 MG tablet  Commonly known as: EFFIENT   TAKE ONE TABLET BY MOUTH DAILY      traZODone 100 MG tablet  Commonly known as: DESYREL   100 mg, Oral, Nightly      Ventolin  (90 Base) MCG/ACT inhaler  Generic  drug: albuterol sulfate HFA   2 puffs, Inhalation, Every 6 Hours PRN, Use or bring dos             Discharge Diet:   ADA 1800-calorie  Activity at Discharge:   As poor foot and ankle surgery and home physical therapy  Follow-up Appointments  Please refer to the body of the discharge summary      Test Results Pending at Discharge  Pending Labs     Order Current Status    Anaerobic Culture - Wound, Foot, Right Preliminary result    Blood Culture - Blood, Arm, Left Preliminary result    Blood Culture - Blood, Arm, Left Preliminary result           Yusuf Jones MD  04/09/21  09:52 EDT

## 2021-04-09 NOTE — PLAN OF CARE
Goal Outcome Evaluation:         Patient is discharging home with home health. Patient refused rehab since unable to go to [place]. CM gave a lot of education to the patient including safety info.

## 2021-04-09 NOTE — THERAPY TREATMENT NOTE
Subjective: Pt agreeable to therapeutic plan of care.    Objective:     Bed mobility - Supervision  Transfers - Min-A and Assist x 2  Ambulation - 0 feet N/A or Not attempted.    Pain: 6 VAS in back from sitting in chair for extended time. Reports no pain in foot.  Education: Provided education on importance of mobility and skilled verbal / tactile cueing throughout intervention.     Assessment: Ro Nathan presents with functional mobility impairments which indicate the need for skilled intervention. Tolerating session today without incident. Treatment somewhat limited this date to transfer from chair to bed due to pt reporting fatigue from sitting up in chair for extended time. Pt requires VC for NWB during transfer as well as UE involvement to allow RLE to remain NWB. Will continue to follow and progress as tolerated.     Plan/Recommendations:   Pt would benefit from Inpatient Rehabilitation placement at discharge from facility and requires no DME at discharge.   Pt desires Inpatient Rehabilitation placement at discharge. Pt cooperative; agreeable to therapeutic recommendations and plan of care.     Basic Mobility 6-click:  Rollin = Total, A lot = 2, A little = 3; 4 = None  Supine>Sit:   1 = Total, A lot = 2, A little = 3; 4 = None   Sit>Stand with arms:  1 = Total, A lot = 2, A little = 3; 4 = None  Bed>Chair:   1 = Total, A lot = 2, A little = 3; 4 = None  Ambulate in room:  1 = Total, A lot = 2, A little = 3; 4 = None  3-5 Steps with railin = Total, A lot = 2, A little = 3; 4 = None  Score: 12    Modified [place]: N/A = No pre-op stroke/TIA    Post-Tx Position: Supine with HOB Elevated  PPE: gloves, surgical mask, eyewear protection

## 2021-04-09 NOTE — DISCHARGE INSTRUCTIONS
1.  Nonweightbearing, to right lower extremity.  However, will allow weightbearing to his heel in tall cam boot only as needed for balance purposes.  2.  Dressing is to remain clean, dry, intact until next follow-up visit  3.  Ice behind knee, 20 minutes on/20 minutes off, as often as possible for pain and swelling control  4.  Elevate above heart level as often as possible for additional pain and swelling control  5.  Start taking your antibiotic tomorrow as prescribed for infection prophylaxis  6.  Follow-up in my office on Wednesday, 4/14/2021 for incision and swelling check.  Please call if anything flares up between now and then

## 2021-04-09 NOTE — THERAPY TREATMENT NOTE
Subjective: Pt agreeable to therapeutic plan of care.  Cognition: safety/judgement: poor and awareness of deficits: fair awareness of deficits and poor awareness of safety precaution   Pt is to be NWB on his RLE in tall CAM boot to be brought in by his spouse. There was surgical shoe in the room & OT did not use this for bed>chair transfer. Pt needed mod cues to keep weight off his surgical foot & he demonstrated limited understanding of his deficits & safety precautions, initially reporting he transferred himself back to bed yesterday without difficulty but then preparing to stand reported he needed assist to stand. Pt needs assist of 2 for safety. RN notified & assisting w/ transfer.    Objective:     Bed Mobility: Supervision sup>sit w/ cues to avoid bridging w/ Rt foot.  Functional Transfers: Min-A and Assist x 2  Functional Ambulation: N/A or Not attempted.    Toileting, Grooming and Feeding: SBA and Dependent  ADL Position: supported sitting  ADL Comments: Pt uses urinal w/ setup but requires max (A) to use BSC & 2-person assist for transfer. Pt completes grooming tasks & self feeding tasks sitting up with setup & min cues.    Pain: 0 VAS  Education: Provided education on importance of mobility and skilled verbal / tactile cueing throughout intervention.     Assessment: Ro Nathan presents with ADL impairments below baseline abilities which indicate the need for continued skilled intervention while inpatient. Tolerating session today without incident. Continues to require cues for safety & to maintain NWB status to his RLE. Needs setup for basic UB ADL but max (A) for LB ADL. Continue to recommend IP rehab at d/c as this represents both a safety concern for home w/ spouse & a significant decline in his overall functioning & basic mobility skill. Will continue to follow and progress as tolerated.     Plan/Recommendations:   Pt would benefit from Inpatient Rehabilitation placement at discharge from  facility.   Pt desires Inpatient Rehabilitation placement at discharge. Pt cooperative; agreeable to therapeutic recommendations and plan of care.     Modified [place]: 4 = Moderately severe disability (Unable to attend to own bodily needs without assistance, and unable to walk unassisted)   [facility] Mobility: 13    Post-Tx Position: Up in Chair, Alarms activated and Call light and personal items within reach  PPE: gloves, surgical mask, eyewear protection

## 2021-04-10 ENCOUNTER — READMISSION MANAGEMENT (OUTPATIENT)
Dept: CALL CENTER | Facility: HOSPITAL | Age: 76
End: 2021-04-10

## 2021-04-10 LAB
BACTERIA SPEC AEROBE CULT: NORMAL
BACTERIA SPEC AEROBE CULT: NORMAL

## 2021-04-10 NOTE — OUTREACH NOTE
Prep Survey      Responses   [facility] patient discharged from?  [facility]   Is LACE score < 7 ?  No   Emergency Room discharge w/ pulse ox?  No   Eligibility  Readm Mgmt   Discharge diagnosis  Diabetic foot ulcer to the level of the bone right foot [Status post incision and drainage of the right foot fifth metatarsal to bone and partial fifth metatarsal resection 4/6/2021]   Does the patient have one of the following disease processes/diagnoses(primary or secondary)?  General Surgery   Does the patient have Home health ordered?  Yes   What is the Home health agency?   [facility]    Is there a DME ordered?  No   Comments regarding appointments  Per AVS   Medication alerts for this patient  Continue Effient.  Meds per AVS.   Prep survey completed?  Yes          Kamini Smith RN

## 2021-04-11 LAB — BACTERIA SPEC ANAEROBE CULT: NORMAL

## 2021-04-12 ENCOUNTER — READMISSION MANAGEMENT (OUTPATIENT)
Dept: CALL CENTER | Facility: HOSPITAL | Age: 76
End: 2021-04-12

## 2021-04-12 NOTE — OUTREACH NOTE
General Surgery Week 1 Survey      Responses   [facility] patient discharged from?  [facility]   Does the patient have one of the following disease processes/diagnoses(primary or secondary)?  General Surgery   Week 1 attempt successful?  Yes   Call start time  1444   Call end time  1446   Discharge diagnosis  Diabetic foot ulcer to the level of the bone right foot   Person spoke with today (if not patient) and relationship  Megahn-spouse   Meds reviewed with patient/caregiver?  Yes   Is the patient having any side effects they believe may be caused by any medication additions or changes?  No   Does the patient have all medications related to this admission filled (includes all antibiotics, pain medications, etc.)  Yes   Is the patient taking all medications as directed (includes completed medication regime)?  Yes   Does the patient have a follow up appointment scheduled with their surgeon?  Yes   Has the patient kept scheduled appointments due by today?  Yes   Comments  pt will see Dr. Elliott on wednesday.  Patient will see PCP on friday   What is the Home health agency?   [facility]    Has home health visited the patient within 72 hours of discharge?  Yes   Psychosocial issues?  No   Did the patient receive a copy of their discharge instructions?  Yes   Nursing interventions  Reviewed instructions with patient   What is the patient's perception of their health status since discharge?  Improving   Is the patient/caregiver able to teach back the hierarchy of who to call/visit for symptoms/problems? PCP, Specialist, Home health nurse, Urgent Care, ED, 911  Yes   Additional teach back comments  Spouse states the foot had some bleeding last night but HH nurse visited today and states it was nothing concerning   Week 1 call completed?  Yes   Wrap up additional comments  Spouse denies needs at this time.           Anju Phipps RN

## 2021-04-12 NOTE — PROGRESS NOTES
Case Management Discharge Note      Final Note: Home with [facility]    Provided Post Acute Provider List?: N/A  N/A Provider List Comment: wants referral to [facility]    Selected Continued Care - Discharged on 4/9/2021 Admission date: 4/5/2021 - Discharge disposition: Home-Health Care Svc        Home Medical Care Coordination complete    Service Provider Selected Services Address Phone Fax Patient Preferred    [facility] [place]  Home Health Services [address] [id number] [phone] --                    Final Discharge Disposition Code: 06 - home with home health care

## 2021-04-15 ENCOUNTER — TELEPHONE (OUTPATIENT)
Dept: PAIN MEDICINE | Facility: CLINIC | Age: 76
End: 2021-04-15

## 2021-04-15 NOTE — TELEPHONE ENCOUNTER
He is home from the [facility] now, wife wants to know if he can have a phone visit because he is stuck in a wheelchair currently?

## 2021-04-15 NOTE — TELEPHONE ENCOUNTER
He can do it today or Monday whatever works for you. If you would have him set up today we need to call [phone] to speak with him.

## 2021-04-20 ENCOUNTER — READMISSION MANAGEMENT (OUTPATIENT)
Dept: CALL CENTER | Facility: HOSPITAL | Age: 76
End: 2021-04-20

## 2021-04-20 ENCOUNTER — OFFICE VISIT (OUTPATIENT)
Dept: PAIN MEDICINE | Facility: CLINIC | Age: 76
End: 2021-04-20

## 2021-04-20 VITALS — HEIGHT: 72 IN | BODY MASS INDEX: 38.6 KG/M2 | WEIGHT: 285 LBS | RESPIRATION RATE: 16 BRPM

## 2021-04-20 DIAGNOSIS — M47.817 SPONDYLOSIS OF LUMBOSACRAL REGION WITHOUT MYELOPATHY OR RADICULOPATHY: ICD-10-CM

## 2021-04-20 DIAGNOSIS — M51.36 DDD (DEGENERATIVE DISC DISEASE), LUMBAR: ICD-10-CM

## 2021-04-20 DIAGNOSIS — G89.29 CHRONIC MIDLINE LOW BACK PAIN WITHOUT SCIATICA: Primary | ICD-10-CM

## 2021-04-20 DIAGNOSIS — M54.16 LUMBAR RADICULOPATHY: ICD-10-CM

## 2021-04-20 DIAGNOSIS — M54.50 CHRONIC MIDLINE LOW BACK PAIN WITHOUT SCIATICA: Primary | ICD-10-CM

## 2021-04-20 PROCEDURE — 99441 PR PHYS/QHP TELEPHONE EVALUATION 5-10 MIN: CPT | Performed by: PHYSICAL MEDICINE & REHABILITATION

## 2021-04-20 RX ORDER — OXYCODONE AND ACETAMINOPHEN 10; 325 MG/1; MG/1
1 TABLET ORAL EVERY 6 HOURS PRN
Qty: 120 TABLET | Refills: 0 | Status: ON HOLD | OUTPATIENT
Start: 2021-04-20 | End: 2021-05-17

## 2021-04-20 NOTE — OUTREACH NOTE
General Surgery Week 2 Survey      Responses   [facility] patient discharged from?  [facility]   Does the patient have one of the following disease processes/diagnoses(primary or secondary)?  General Surgery   Week 2 attempt successful?  Yes   Call start time  1108   Call end time  1112   Discharge diagnosis  Diabetic foot ulcer to the level of the bone right foot   Is patient permission given to speak with other caregiver?  Yes   List who call center can speak with  Meghan, spouse   Person spoke with today (if not patient) and relationship  Meghan-spouse   Meds reviewed with patient/caregiver?  Yes   Is the patient taking all medications as directed (includes completed medication regime)?  Yes   Medication comments  Wife states that patient is currently on antibiotic.    Does the patient have a follow up appointment scheduled with their surgeon?  Yes [Wife states patient seeing dr again tomorrow. ]   Has the patient kept scheduled appointments due by today?  Yes   What is the Home health agency?   Discharged by .    Psychosocial issues?  No   Comments  Wife states that she is doing wound care/dressing changes twice a day.    Did the patient receive a copy of their discharge instructions?  Yes   Nursing interventions  Reviewed instructions with patient   What is the patient's perception of their health status since discharge?  Improving   Nursing interventions  Nurse provided patient education   Is the patient /caregiver able to teach back basic post-op care?  Keep incision areas clean,dry and protected   Is the patient/caregiver able to teach back signs and symptoms of incisional infection?  Increased redness, swelling or pain at the incisonal site, Increased drainage or bleeding, Incisional warmth, Pus or odor from incision   Is the patient/caregiver able to teach back steps to recovery at home?  Set small, achievable goals for return to baseline health, Rest and rebuild strength, gradually increase activity   If  the patient is a current smoker, are they able to teach back resources for cessation?  Not a smoker   Is the patient/caregiver able to teach back the hierarchy of who to call/visit for symptoms/problems? PCP, Specialist, Home health nurse, Urgent Care, ED, 911  Yes   Week 2 call completed?  Yes   Wrap up additional comments  Spouse denies any new questions or needs today.           Lucie Joseph RN
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
## 2021-04-25 NOTE — PLAN OF CARE
Problem: Patient Care Overview  Goal: Plan of Care Review  8/6/2020 1110 by Kathi Hall, PT  Outcome: Ongoing (interventions implemented as appropriate)  Flowsheets (Taken 8/6/2020 1110)  Progress: improving  Plan of Care Reviewed With: patient  Outcome Summary: Pt is 75 yo male admitted for abdominal pain.  CT abdomen showing hernia and small bowel obstruction.  Conservative mgmt at this time with NG tube placed.  Pt able to complete bed mobility with indep, transfers with indep.  Pt able to ambulate using RW with indep and assist for IV pole.  Pt reports he has RW at home if needed.  Pt can be up with assist of nursing staff for IV pole mgmt.  No further inpatient PT needs.  Plan home with wife.  PPE donned: mask with faceshield, gloves.     
  Problem: Patient Care Overview  Goal: Plan of Care Review  Outcome: Ongoing (interventions implemented as appropriate)  Note:   Pt new to floor from ED. Presents with N/V, NG present. Large Hernias present with severe abdominal distention.      
Currently sitting in chair. Attempted to walk in [place] but c/o feeling weak so he returned to room. Continues to take Dilaudid frequently with minimal relief of symptoms. Now allowed to have one popsicle every 3 hours. N/G tube continues to put out green/brown output  
Diet advanced for lunch. Pain is still being controlled with IV hydromorphone.  
Patient pain has been controlled with IV pain medication this shift. Patient up and walking in hallway independently. Patient c/o restless leg. Dr Dupont notified Lyrica ordered as pt was taking this at home for restless leg. Patient on room air. Patient reports two bowel movement to day.   
Patient still complains of abd pain. Continues to require IV pain meds. Tolerating clear liquids.  
Patient tolerating clear diet, NG tube d/c , pain improved. Will continue to monitor.  
Pt is resting in bed currently.  
Pt's pain has not been controlled very well. NG tube was removed by patient accidentally. After replacing it, pt seems to be tolerating pain much better. Will continue to monitor.  
positive titers

## 2021-04-26 ENCOUNTER — TRANSCRIBE ORDERS (OUTPATIENT)
Dept: CARDIOLOGY | Facility: CLINIC | Age: 76
End: 2021-04-26

## 2021-04-26 DIAGNOSIS — I25.10 ASHD (ARTERIOSCLEROTIC HEART DISEASE): Primary | ICD-10-CM

## 2021-04-28 ENCOUNTER — READMISSION MANAGEMENT (OUTPATIENT)
Dept: CALL CENTER | Facility: HOSPITAL | Age: 76
End: 2021-04-28

## 2021-04-28 NOTE — OUTREACH NOTE
General Surgery Week 3 Survey      Responses   [facility] patient discharged from?  [facility]   Does the patient have one of the following disease processes/diagnoses(primary or secondary)?  General Surgery   Week 3 attempt successful?  Yes   Call start time  1050   Call end time  1051   Discharge diagnosis  Diabetic foot ulcer to the level of the bone right foot   Person spoke with today (if not patient) and relationship  Meghan-spouse   Meds reviewed with patient/caregiver?  Yes   Is the patient taking all medications as directed (includes completed medication regime)?  Yes   Has the patient kept scheduled appointments due by today?  Yes   Psychosocial issues?  No   Comments  Wife states that she is doing wound care/dressing changes twice a day. Wound is healing well, should have sutures DC'd next week.    What is the patient's perception of their health status since discharge?  Improving   Is the patient/caregiver able to teach back the hierarchy of who to call/visit for symptoms/problems? PCP, Specialist, Home health nurse, Urgent Care, ED, 911  Yes   Week 3 call completed?  Yes          Cat Stern RN

## 2021-05-09 ENCOUNTER — READMISSION MANAGEMENT (OUTPATIENT)
Dept: CALL CENTER | Facility: HOSPITAL | Age: 76
End: 2021-05-09

## 2021-05-09 NOTE — OUTREACH NOTE
General Surgery Week 4 Survey      Responses   [facility] patient discharged from?  [facility]   Does the patient have one of the following disease processes/diagnoses(primary or secondary)?  General Surgery   Week 4 attempt successful?  No   Rescheduled  Revoked          Arabella Garber RN

## 2021-05-12 ENCOUNTER — HOME HEALTH ADMISSION (OUTPATIENT)
Dept: HOME HEALTH SERVICES | Facility: HOME HEALTHCARE | Age: 76
End: 2021-05-12

## 2021-05-12 ENCOUNTER — TRANSCRIBE ORDERS (OUTPATIENT)
Dept: HOME HEALTH SERVICES | Facility: HOME HEALTHCARE | Age: 76
End: 2021-05-12

## 2021-05-12 DIAGNOSIS — L97.509 TYPE 2 DIABETES MELLITUS WITH FOOT ULCER, UNSPECIFIED WHETHER LONG TERM INSULIN USE (HCC): Primary | ICD-10-CM

## 2021-05-12 DIAGNOSIS — E11.621 TYPE 2 DIABETES MELLITUS WITH FOOT ULCER, UNSPECIFIED WHETHER LONG TERM INSULIN USE (HCC): Primary | ICD-10-CM

## 2021-05-17 ENCOUNTER — HOSPITAL ENCOUNTER (INPATIENT)
Facility: HOSPITAL | Age: 76
LOS: 4 days | Discharge: HOME HEALTH | End: 2021-05-21
Attending: FAMILY MEDICINE | Admitting: FAMILY MEDICINE

## 2021-05-17 ENCOUNTER — APPOINTMENT (OUTPATIENT)
Dept: CT IMAGING | Facility: HOSPITAL | Age: 76
End: 2021-05-17

## 2021-05-17 ENCOUNTER — APPOINTMENT (OUTPATIENT)
Dept: GENERAL RADIOLOGY | Facility: HOSPITAL | Age: 76
End: 2021-05-17

## 2021-05-17 DIAGNOSIS — L97.516 ULCER OF RIGHT FOOT WITH BONE INVOLVEMENT WITHOUT EVIDENCE OF NECROSIS (HCC): Primary | ICD-10-CM

## 2021-05-17 DIAGNOSIS — K56.50: ICD-10-CM

## 2021-05-17 PROBLEM — R10.9 ACUTE ABDOMINAL PAIN: Status: ACTIVE | Noted: 2021-05-17

## 2021-05-17 PROBLEM — R19.7 DIARRHEA: Status: ACTIVE | Noted: 2021-05-17

## 2021-05-17 PROBLEM — R14.0 ABDOMINAL BLOATING: Status: ACTIVE | Noted: 2021-05-17

## 2021-05-17 LAB
ALBUMIN SERPL-MCNC: 3.3 G/DL (ref 3.5–5.2)
ALBUMIN/GLOB SERPL: 1 G/DL
ALP SERPL-CCNC: 355 U/L (ref 39–117)
ALT SERPL W P-5'-P-CCNC: 68 U/L (ref 1–41)
AMYLASE SERPL-CCNC: 103 U/L (ref 28–100)
ANION GAP SERPL CALCULATED.3IONS-SCNC: 14 MMOL/L (ref 5–15)
AST SERPL-CCNC: 172 U/L (ref 1–40)
BASOPHILS # BLD AUTO: 0 10*3/MM3 (ref 0–0.2)
BASOPHILS NFR BLD AUTO: 0.8 % (ref 0–1.5)
BILIRUB CONJ SERPL-MCNC: 0.5 MG/DL (ref 0–0.3)
BILIRUB SERPL-MCNC: 1.1 MG/DL (ref 0–1.2)
BUN SERPL-MCNC: 32 MG/DL (ref 8–23)
BUN/CREAT SERPL: 16.2 (ref 7–25)
CALCIUM SPEC-SCNC: 8.6 MG/DL (ref 8.6–10.5)
CHLORIDE SERPL-SCNC: 94 MMOL/L (ref 98–107)
CO2 SERPL-SCNC: 22 MMOL/L (ref 22–29)
CREAT SERPL-MCNC: 1.98 MG/DL (ref 0.76–1.27)
D-LACTATE SERPL-SCNC: 1.5 MMOL/L (ref 0.5–2)
DEPRECATED RDW RBC AUTO: 47.7 FL (ref 37–54)
EOSINOPHIL # BLD AUTO: 0.1 10*3/MM3 (ref 0–0.4)
EOSINOPHIL NFR BLD AUTO: 1.9 % (ref 0.3–6.2)
ERYTHROCYTE [DISTWIDTH] IN BLOOD BY AUTOMATED COUNT: 15.8 % (ref 12.3–15.4)
GFR SERPL CREATININE-BSD FRML MDRD: 33 ML/MIN/1.73
GLOBULIN UR ELPH-MCNC: 3.2 GM/DL
GLUCOSE BLDC GLUCOMTR-MCNC: 238 MG/DL (ref 70–105)
GLUCOSE SERPL-MCNC: 337 MG/DL (ref 65–99)
HCT VFR BLD AUTO: 40.5 % (ref 37.5–51)
HGB BLD-MCNC: 13.8 G/DL (ref 13–17.7)
LIPASE SERPL-CCNC: 238 U/L (ref 13–60)
LYMPHOCYTES # BLD AUTO: 0.8 10*3/MM3 (ref 0.7–3.1)
LYMPHOCYTES NFR BLD AUTO: 15 % (ref 19.6–45.3)
MAGNESIUM SERPL-MCNC: 1.8 MG/DL (ref 1.6–2.4)
MCH RBC QN AUTO: 29.7 PG (ref 26.6–33)
MCHC RBC AUTO-ENTMCNC: 33.9 G/DL (ref 31.5–35.7)
MCV RBC AUTO: 87.4 FL (ref 79–97)
MONOCYTES # BLD AUTO: 0.6 10*3/MM3 (ref 0.1–0.9)
MONOCYTES NFR BLD AUTO: 11 % (ref 5–12)
NEUTROPHILS NFR BLD AUTO: 3.9 10*3/MM3 (ref 1.7–7)
NEUTROPHILS NFR BLD AUTO: 71.3 % (ref 42.7–76)
NRBC BLD AUTO-RTO: 0.2 /100 WBC (ref 0–0.2)
PLATELET # BLD AUTO: 288 10*3/MM3 (ref 140–450)
PMV BLD AUTO: 8.8 FL (ref 6–12)
POTASSIUM SERPL-SCNC: 4.7 MMOL/L (ref 3.5–5.2)
PROCALCITONIN SERPL-MCNC: 0.27 NG/ML (ref 0–0.25)
PROT SERPL-MCNC: 6.5 G/DL (ref 6–8.5)
RBC # BLD AUTO: 4.64 10*6/MM3 (ref 4.14–5.8)
SARS-COV-2 RNA PNL SPEC NAA+PROBE: NOT DETECTED
SODIUM SERPL-SCNC: 130 MMOL/L (ref 136–145)
WBC # BLD AUTO: 5.5 10*3/MM3 (ref 3.4–10.8)

## 2021-05-17 PROCEDURE — G0378 HOSPITAL OBSERVATION PER HR: HCPCS

## 2021-05-17 PROCEDURE — 25010000002 PIPERACILLIN SOD-TAZOBACTAM PER 1 G: Performed by: FAMILY MEDICINE

## 2021-05-17 PROCEDURE — 25010000002 ONDANSETRON PER 1 MG: Performed by: FAMILY MEDICINE

## 2021-05-17 PROCEDURE — 82962 GLUCOSE BLOOD TEST: CPT

## 2021-05-17 PROCEDURE — 25010000002 HYDROMORPHONE PER 4 MG: Performed by: FAMILY MEDICINE

## 2021-05-17 PROCEDURE — 93005 ELECTROCARDIOGRAM TRACING: CPT | Performed by: FAMILY MEDICINE

## 2021-05-17 PROCEDURE — 83690 ASSAY OF LIPASE: CPT | Performed by: FAMILY MEDICINE

## 2021-05-17 PROCEDURE — 94799 UNLISTED PULMONARY SVC/PX: CPT

## 2021-05-17 PROCEDURE — 82248 BILIRUBIN DIRECT: CPT | Performed by: FAMILY MEDICINE

## 2021-05-17 PROCEDURE — 83605 ASSAY OF LACTIC ACID: CPT | Performed by: FAMILY MEDICINE

## 2021-05-17 PROCEDURE — 99215 OFFICE O/P EST HI 40 MIN: CPT | Performed by: SURGERY

## 2021-05-17 PROCEDURE — 84145 PROCALCITONIN (PCT): CPT | Performed by: FAMILY MEDICINE

## 2021-05-17 PROCEDURE — 71046 X-RAY EXAM CHEST 2 VIEWS: CPT

## 2021-05-17 PROCEDURE — 74176 CT ABD & PELVIS W/O CONTRAST: CPT

## 2021-05-17 PROCEDURE — 83735 ASSAY OF MAGNESIUM: CPT | Performed by: FAMILY MEDICINE

## 2021-05-17 PROCEDURE — U0003 INFECTIOUS AGENT DETECTION BY NUCLEIC ACID (DNA OR RNA); SEVERE ACUTE RESPIRATORY SYNDROME CORONAVIRUS 2 (SARS-COV-2) (CORONAVIRUS DISEASE [COVID-19]), AMPLIFIED PROBE TECHNIQUE, MAKING USE OF HIGH THROUGHPUT TECHNOLOGIES AS DESCRIBED BY CMS-2020-01-R: HCPCS | Performed by: FAMILY MEDICINE

## 2021-05-17 PROCEDURE — 80053 COMPREHEN METABOLIC PANEL: CPT | Performed by: FAMILY MEDICINE

## 2021-05-17 PROCEDURE — 85025 COMPLETE CBC W/AUTO DIFF WBC: CPT | Performed by: FAMILY MEDICINE

## 2021-05-17 PROCEDURE — 82150 ASSAY OF AMYLASE: CPT | Performed by: FAMILY MEDICINE

## 2021-05-17 PROCEDURE — 93010 ELECTROCARDIOGRAM REPORT: CPT | Performed by: INTERNAL MEDICINE

## 2021-05-17 RX ORDER — NICOTINE POLACRILEX 4 MG
15 LOZENGE BUCCAL
Status: DISCONTINUED | OUTPATIENT
Start: 2021-05-17 | End: 2021-05-21 | Stop reason: HOSPADM

## 2021-05-17 RX ORDER — HYDROMORPHONE HCL 110MG/55ML
1 PATIENT CONTROLLED ANALGESIA SYRINGE INTRAVENOUS
Status: DISCONTINUED | OUTPATIENT
Start: 2021-05-17 | End: 2021-05-21 | Stop reason: HOSPADM

## 2021-05-17 RX ORDER — INSULIN LISPRO 100 [IU]/ML
0-9 INJECTION, SOLUTION INTRAVENOUS; SUBCUTANEOUS
Status: DISCONTINUED | OUTPATIENT
Start: 2021-05-18 | End: 2021-05-21 | Stop reason: HOSPADM

## 2021-05-17 RX ORDER — OXYCODONE AND ACETAMINOPHEN 10; 325 MG/1; MG/1
1 TABLET ORAL 4 TIMES DAILY PRN
COMMUNITY
End: 2021-07-26 | Stop reason: SDUPTHER

## 2021-05-17 RX ORDER — CLOPIDOGREL BISULFATE 75 MG/1
75 TABLET ORAL EVERY MORNING
COMMUNITY
End: 2021-11-23

## 2021-05-17 RX ORDER — GLYCOPYRROLATE 2 MG/1
2 TABLET ORAL 2 TIMES DAILY
COMMUNITY
End: 2021-05-21 | Stop reason: HOSPADM

## 2021-05-17 RX ORDER — DEXTROSE MONOHYDRATE 25 G/50ML
25 INJECTION, SOLUTION INTRAVENOUS
Status: DISCONTINUED | OUTPATIENT
Start: 2021-05-17 | End: 2021-05-21 | Stop reason: HOSPADM

## 2021-05-17 RX ORDER — ASPIRIN 81 MG/1
81 TABLET ORAL DAILY
COMMUNITY
End: 2021-08-06 | Stop reason: HOSPADM

## 2021-05-17 RX ORDER — MAGNESIUM SULFATE HEPTAHYDRATE 40 MG/ML
2 INJECTION, SOLUTION INTRAVENOUS AS NEEDED
Status: DISCONTINUED | OUTPATIENT
Start: 2021-05-17 | End: 2021-05-21 | Stop reason: HOSPADM

## 2021-05-17 RX ORDER — SODIUM CHLORIDE 0.9 % (FLUSH) 0.9 %
3 SYRINGE (ML) INJECTION EVERY 12 HOURS SCHEDULED
Status: DISCONTINUED | OUTPATIENT
Start: 2021-05-17 | End: 2021-05-21 | Stop reason: HOSPADM

## 2021-05-17 RX ORDER — NITROGLYCERIN 0.4 MG/1
0.4 TABLET SUBLINGUAL
Status: DISCONTINUED | OUTPATIENT
Start: 2021-05-17 | End: 2021-05-21 | Stop reason: HOSPADM

## 2021-05-17 RX ORDER — SODIUM CHLORIDE 0.9 % (FLUSH) 0.9 %
3-10 SYRINGE (ML) INJECTION AS NEEDED
Status: DISCONTINUED | OUTPATIENT
Start: 2021-05-17 | End: 2021-05-21 | Stop reason: HOSPADM

## 2021-05-17 RX ORDER — LABETALOL HYDROCHLORIDE 5 MG/ML
20 INJECTION, SOLUTION INTRAVENOUS EVERY 4 HOURS PRN
Status: DISCONTINUED | OUTPATIENT
Start: 2021-05-17 | End: 2021-05-21 | Stop reason: HOSPADM

## 2021-05-17 RX ORDER — MAGNESIUM SULFATE HEPTAHYDRATE 40 MG/ML
4 INJECTION, SOLUTION INTRAVENOUS AS NEEDED
Status: DISCONTINUED | OUTPATIENT
Start: 2021-05-17 | End: 2021-05-21 | Stop reason: HOSPADM

## 2021-05-17 RX ORDER — ONDANSETRON 2 MG/ML
4 INJECTION INTRAMUSCULAR; INTRAVENOUS EVERY 6 HOURS PRN
Status: DISCONTINUED | OUTPATIENT
Start: 2021-05-17 | End: 2021-05-21 | Stop reason: HOSPADM

## 2021-05-17 RX ORDER — INSULIN LISPRO 100 [IU]/ML
0-9 INJECTION, SOLUTION INTRAVENOUS; SUBCUTANEOUS AS NEEDED
Status: DISCONTINUED | OUTPATIENT
Start: 2021-05-17 | End: 2021-05-21 | Stop reason: HOSPADM

## 2021-05-17 RX ORDER — SODIUM CHLORIDE 9 MG/ML
125 INJECTION, SOLUTION INTRAVENOUS CONTINUOUS
Status: DISCONTINUED | OUTPATIENT
Start: 2021-05-17 | End: 2021-05-19

## 2021-05-17 RX ORDER — ASPIRIN 325 MG
325 TABLET ORAL 2 TIMES DAILY
Status: ON HOLD | COMMUNITY
End: 2021-05-17

## 2021-05-17 RX ORDER — ACETAMINOPHEN 325 MG/1
650 TABLET ORAL EVERY 6 HOURS PRN
Status: DISCONTINUED | OUTPATIENT
Start: 2021-05-17 | End: 2021-05-21 | Stop reason: HOSPADM

## 2021-05-17 RX ORDER — POTASSIUM CHLORIDE 7.45 MG/ML
10 INJECTION INTRAVENOUS
Status: DISCONTINUED | OUTPATIENT
Start: 2021-05-17 | End: 2021-05-21 | Stop reason: HOSPADM

## 2021-05-17 RX ADMIN — HYDROMORPHONE HYDROCHLORIDE 1 MG: 2 INJECTION, SOLUTION INTRAMUSCULAR; INTRAVENOUS; SUBCUTANEOUS at 20:20

## 2021-05-17 RX ADMIN — Medication 3 ML: at 14:52

## 2021-05-17 RX ADMIN — PIPERACILLIN AND TAZOBACTAM 3.38 G: 3; .375 INJECTION, POWDER, LYOPHILIZED, FOR SOLUTION INTRAVENOUS at 14:52

## 2021-05-17 RX ADMIN — SODIUM CHLORIDE 100 ML/HR: 9 INJECTION, SOLUTION INTRAVENOUS at 14:53

## 2021-05-17 RX ADMIN — PIPERACILLIN AND TAZOBACTAM 3.38 G: 3; .375 INJECTION, POWDER, LYOPHILIZED, FOR SOLUTION INTRAVENOUS at 19:50

## 2021-05-17 RX ADMIN — ONDANSETRON 4 MG: 2 INJECTION INTRAMUSCULAR; INTRAVENOUS at 14:53

## 2021-05-17 RX ADMIN — HYDROMORPHONE HYDROCHLORIDE 1 MG: 2 INJECTION, SOLUTION INTRAMUSCULAR; INTRAVENOUS; SUBCUTANEOUS at 14:52

## 2021-05-18 LAB
ALBUMIN SERPL-MCNC: 3.1 G/DL (ref 3.5–5.2)
ALBUMIN/GLOB SERPL: 1 G/DL
ALP SERPL-CCNC: 366 U/L (ref 39–117)
ALT SERPL W P-5'-P-CCNC: 92 U/L (ref 1–41)
AMORPH URATE CRY URNS QL MICRO: ABNORMAL /HPF
ANION GAP SERPL CALCULATED.3IONS-SCNC: 12 MMOL/L (ref 5–15)
AST SERPL-CCNC: 168 U/L (ref 1–40)
BACTERIA UR QL AUTO: ABNORMAL /HPF
BASOPHILS # BLD AUTO: 0 10*3/MM3 (ref 0–0.2)
BASOPHILS NFR BLD AUTO: 0.9 % (ref 0–1.5)
BILIRUB SERPL-MCNC: 0.9 MG/DL (ref 0–1.2)
BILIRUB UR QL STRIP: ABNORMAL
BUN SERPL-MCNC: 37 MG/DL (ref 8–23)
BUN/CREAT SERPL: 13.8 (ref 7–25)
C3 SERPL-MCNC: 130 MG/DL (ref 82–167)
C4 SERPL-MCNC: 30 MG/DL (ref 14–44)
CALCIUM SPEC-SCNC: 8.4 MG/DL (ref 8.6–10.5)
CHLORIDE SERPL-SCNC: 100 MMOL/L (ref 98–107)
CHROMATIN AB SERPL-ACNC: 10.4 IU/ML (ref 0–14)
CK SERPL-CCNC: 34 U/L (ref 20–200)
CLARITY UR: ABNORMAL
CO2 SERPL-SCNC: 21 MMOL/L (ref 22–29)
COLOR UR: ABNORMAL
CREAT SERPL-MCNC: 2.69 MG/DL (ref 0.76–1.27)
DEPRECATED RDW RBC AUTO: 50.3 FL (ref 37–54)
EOSINOPHIL # BLD AUTO: 0.2 10*3/MM3 (ref 0–0.4)
EOSINOPHIL NFR BLD AUTO: 2.9 % (ref 0.3–6.2)
EOSINOPHIL SPEC QL MICRO: 0 % EOS/100 CELLS (ref 0–0)
ERYTHROCYTE [DISTWIDTH] IN BLOOD BY AUTOMATED COUNT: 16.3 % (ref 12.3–15.4)
GFR SERPL CREATININE-BSD FRML MDRD: 23 ML/MIN/1.73
GLOBULIN UR ELPH-MCNC: 3.1 GM/DL
GLUCOSE BLDC GLUCOMTR-MCNC: 186 MG/DL (ref 70–105)
GLUCOSE BLDC GLUCOMTR-MCNC: 190 MG/DL (ref 70–105)
GLUCOSE BLDC GLUCOMTR-MCNC: 191 MG/DL (ref 70–105)
GLUCOSE BLDC GLUCOMTR-MCNC: 206 MG/DL (ref 70–105)
GLUCOSE SERPL-MCNC: 205 MG/DL (ref 65–99)
GLUCOSE UR STRIP-MCNC: ABNORMAL MG/DL
GRAN CASTS URNS QL MICRO: ABNORMAL /LPF
HCT VFR BLD AUTO: 40.4 % (ref 37.5–51)
HGB BLD-MCNC: 13.6 G/DL (ref 13–17.7)
HGB UR QL STRIP.AUTO: ABNORMAL
HOLD SPECIMEN: NORMAL
HYALINE CASTS UR QL AUTO: ABNORMAL /LPF
KETONES UR QL STRIP: ABNORMAL
LEUKOCYTE ESTERASE UR QL STRIP.AUTO: NEGATIVE
LYMPHOCYTES # BLD AUTO: 1.1 10*3/MM3 (ref 0.7–3.1)
LYMPHOCYTES NFR BLD AUTO: 20.4 % (ref 19.6–45.3)
MAGNESIUM SERPL-MCNC: 1.9 MG/DL (ref 1.6–2.4)
MCH RBC QN AUTO: 29.5 PG (ref 26.6–33)
MCHC RBC AUTO-ENTMCNC: 33.6 G/DL (ref 31.5–35.7)
MCV RBC AUTO: 87.9 FL (ref 79–97)
MONOCYTES # BLD AUTO: 0.8 10*3/MM3 (ref 0.1–0.9)
MONOCYTES NFR BLD AUTO: 14.3 % (ref 5–12)
NEUTROPHILS NFR BLD AUTO: 3.3 10*3/MM3 (ref 1.7–7)
NEUTROPHILS NFR BLD AUTO: 61.5 % (ref 42.7–76)
NITRITE UR QL STRIP: NEGATIVE
NRBC BLD AUTO-RTO: 0.1 /100 WBC (ref 0–0.2)
PH UR STRIP.AUTO: <=5 [PH] (ref 5–8)
PLATELET # BLD AUTO: 278 10*3/MM3 (ref 140–450)
PMV BLD AUTO: 8.8 FL (ref 6–12)
POTASSIUM SERPL-SCNC: 4.7 MMOL/L (ref 3.5–5.2)
PROT SERPL-MCNC: 6.2 G/DL (ref 6–8.5)
PROT UR QL STRIP: ABNORMAL
PTH-INTACT SERPL-MCNC: 127.9 PG/ML (ref 15–65)
RBC # BLD AUTO: 4.6 10*6/MM3 (ref 4.14–5.8)
RBC # UR: ABNORMAL /HPF
REF LAB TEST METHOD: ABNORMAL
SODIUM SERPL-SCNC: 133 MMOL/L (ref 136–145)
SODIUM UR-SCNC: 36 MMOL/L
SP GR UR STRIP: 1.03 (ref 1–1.03)
SQUAMOUS #/AREA URNS HPF: ABNORMAL /HPF
TSH SERPL DL<=0.05 MIU/L-ACNC: 3.11 UIU/ML (ref 0.27–4.2)
URATE SERPL-MCNC: 7.9 MG/DL (ref 3.4–7)
UROBILINOGEN UR QL STRIP: ABNORMAL
WBC # BLD AUTO: 5.4 10*3/MM3 (ref 3.4–10.8)
WBC UR QL AUTO: ABNORMAL /HPF

## 2021-05-18 PROCEDURE — 25010000002 HYDROMORPHONE PER 4 MG: Performed by: FAMILY MEDICINE

## 2021-05-18 PROCEDURE — 86256 FLUORESCENT ANTIBODY TITER: CPT | Performed by: INTERNAL MEDICINE

## 2021-05-18 PROCEDURE — 82550 ASSAY OF CK (CPK): CPT | Performed by: INTERNAL MEDICINE

## 2021-05-18 PROCEDURE — 86431 RHEUMATOID FACTOR QUANT: CPT | Performed by: INTERNAL MEDICINE

## 2021-05-18 PROCEDURE — 25010000002 PIPERACILLIN SOD-TAZOBACTAM PER 1 G: Performed by: FAMILY MEDICINE

## 2021-05-18 PROCEDURE — 80053 COMPREHEN METABOLIC PANEL: CPT | Performed by: FAMILY MEDICINE

## 2021-05-18 PROCEDURE — 83970 ASSAY OF PARATHORMONE: CPT | Performed by: INTERNAL MEDICINE

## 2021-05-18 PROCEDURE — 86160 COMPLEMENT ANTIGEN: CPT | Performed by: INTERNAL MEDICINE

## 2021-05-18 PROCEDURE — 81001 URINALYSIS AUTO W/SCOPE: CPT | Performed by: FAMILY MEDICINE

## 2021-05-18 PROCEDURE — 25010000002 MAGNESIUM SULFATE IN D5W 1G/100ML (PREMIX) 1-5 GM/100ML-% SOLUTION: Performed by: INTERNAL MEDICINE

## 2021-05-18 PROCEDURE — 85025 COMPLETE CBC W/AUTO DIFF WBC: CPT | Performed by: FAMILY MEDICINE

## 2021-05-18 PROCEDURE — 84300 ASSAY OF URINE SODIUM: CPT | Performed by: INTERNAL MEDICINE

## 2021-05-18 PROCEDURE — 99213 OFFICE O/P EST LOW 20 MIN: CPT | Performed by: SURGERY

## 2021-05-18 PROCEDURE — G0378 HOSPITAL OBSERVATION PER HR: HCPCS

## 2021-05-18 PROCEDURE — 83520 IMMUNOASSAY QUANT NOS NONAB: CPT | Performed by: INTERNAL MEDICINE

## 2021-05-18 PROCEDURE — 84550 ASSAY OF BLOOD/URIC ACID: CPT | Performed by: INTERNAL MEDICINE

## 2021-05-18 PROCEDURE — 83735 ASSAY OF MAGNESIUM: CPT | Performed by: FAMILY MEDICINE

## 2021-05-18 PROCEDURE — 82962 GLUCOSE BLOOD TEST: CPT

## 2021-05-18 PROCEDURE — 86038 ANTINUCLEAR ANTIBODIES: CPT | Performed by: INTERNAL MEDICINE

## 2021-05-18 PROCEDURE — 63710000001 INSULIN LISPRO (HUMAN) PER 5 UNITS: Performed by: NURSE PRACTITIONER

## 2021-05-18 PROCEDURE — 87205 SMEAR GRAM STAIN: CPT | Performed by: INTERNAL MEDICINE

## 2021-05-18 PROCEDURE — 25010000002 PIPERACILLIN SOD-TAZOBACTAM PER 1 G: Performed by: INTERNAL MEDICINE

## 2021-05-18 PROCEDURE — 84443 ASSAY THYROID STIM HORMONE: CPT | Performed by: INTERNAL MEDICINE

## 2021-05-18 PROCEDURE — 25010000002 ONDANSETRON PER 1 MG: Performed by: FAMILY MEDICINE

## 2021-05-18 RX ORDER — MAGNESIUM SULFATE 1 G/100ML
1 INJECTION INTRAVENOUS ONCE
Status: COMPLETED | OUTPATIENT
Start: 2021-05-18 | End: 2021-05-18

## 2021-05-18 RX ORDER — CHOLECALCIFEROL (VITAMIN D3) 125 MCG
10 CAPSULE ORAL NIGHTLY
Status: DISCONTINUED | OUTPATIENT
Start: 2021-05-18 | End: 2021-05-21 | Stop reason: HOSPADM

## 2021-05-18 RX ORDER — SODIUM BICARBONATE 650 MG/1
650 TABLET ORAL 3 TIMES DAILY
Status: DISCONTINUED | OUTPATIENT
Start: 2021-05-18 | End: 2021-05-21 | Stop reason: HOSPADM

## 2021-05-18 RX ADMIN — SODIUM BICARBONATE 650 MG TABLET 650 MG: at 16:42

## 2021-05-18 RX ADMIN — HYDROMORPHONE HYDROCHLORIDE 1 MG: 2 INJECTION, SOLUTION INTRAMUSCULAR; INTRAVENOUS; SUBCUTANEOUS at 03:55

## 2021-05-18 RX ADMIN — HYDROMORPHONE HYDROCHLORIDE 1 MG: 2 INJECTION, SOLUTION INTRAMUSCULAR; INTRAVENOUS; SUBCUTANEOUS at 07:50

## 2021-05-18 RX ADMIN — ONDANSETRON 4 MG: 2 INJECTION INTRAMUSCULAR; INTRAVENOUS at 20:14

## 2021-05-18 RX ADMIN — HYDROMORPHONE HYDROCHLORIDE 1 MG: 2 INJECTION, SOLUTION INTRAMUSCULAR; INTRAVENOUS; SUBCUTANEOUS at 11:42

## 2021-05-18 RX ADMIN — SODIUM BICARBONATE 50 MEQ: 84 INJECTION, SOLUTION INTRAVENOUS at 09:48

## 2021-05-18 RX ADMIN — Medication 3 ML: at 09:48

## 2021-05-18 RX ADMIN — INSULIN LISPRO 4 UNITS: 100 INJECTION, SOLUTION INTRAVENOUS; SUBCUTANEOUS at 11:42

## 2021-05-18 RX ADMIN — HYDROMORPHONE HYDROCHLORIDE 1 MG: 2 INJECTION, SOLUTION INTRAMUSCULAR; INTRAVENOUS; SUBCUTANEOUS at 20:13

## 2021-05-18 RX ADMIN — HYDROMORPHONE HYDROCHLORIDE 1 MG: 2 INJECTION, SOLUTION INTRAMUSCULAR; INTRAVENOUS; SUBCUTANEOUS at 16:42

## 2021-05-18 RX ADMIN — MAGNESIUM SULFATE HEPTAHYDRATE 1 G: 1 INJECTION, SOLUTION INTRAVENOUS at 09:48

## 2021-05-18 RX ADMIN — SODIUM BICARBONATE 650 MG TABLET 650 MG: at 20:14

## 2021-05-18 RX ADMIN — PIPERACILLIN AND TAZOBACTAM 3.38 G: 3; .375 INJECTION, POWDER, LYOPHILIZED, FOR SOLUTION INTRAVENOUS at 16:42

## 2021-05-18 RX ADMIN — Medication 10 MG: at 21:28

## 2021-05-18 RX ADMIN — Medication 3 ML: at 07:50

## 2021-05-18 RX ADMIN — INSULIN LISPRO 2 UNITS: 100 INJECTION, SOLUTION INTRAVENOUS; SUBCUTANEOUS at 16:42

## 2021-05-18 RX ADMIN — PIPERACILLIN AND TAZOBACTAM 3.38 G: 3; .375 INJECTION, POWDER, LYOPHILIZED, FOR SOLUTION INTRAVENOUS at 03:55

## 2021-05-18 RX ADMIN — Medication 10 ML: at 20:14

## 2021-05-19 LAB
ALBUMIN SERPL-MCNC: 3.2 G/DL (ref 3.5–5.2)
ALBUMIN/GLOB SERPL: 1 G/DL
ALP SERPL-CCNC: 394 U/L (ref 39–117)
ALT SERPL W P-5'-P-CCNC: 78 U/L (ref 1–41)
ANA SER QL: NEGATIVE
ANION GAP SERPL CALCULATED.3IONS-SCNC: 12 MMOL/L (ref 5–15)
AST SERPL-CCNC: 72 U/L (ref 1–40)
BASOPHILS # BLD AUTO: 0.1 10*3/MM3 (ref 0–0.2)
BASOPHILS NFR BLD AUTO: 1 % (ref 0–1.5)
BILIRUB SERPL-MCNC: 0.7 MG/DL (ref 0–1.2)
BUN SERPL-MCNC: 32 MG/DL (ref 8–23)
BUN/CREAT SERPL: 20.4 (ref 7–25)
CA-I SERPL ISE-MCNC: 1.21 MMOL/L (ref 1.2–1.3)
CALCIUM SPEC-SCNC: 8.5 MG/DL (ref 8.6–10.5)
CHLORIDE SERPL-SCNC: 100 MMOL/L (ref 98–107)
CO2 SERPL-SCNC: 21 MMOL/L (ref 22–29)
CREAT SERPL-MCNC: 1.57 MG/DL (ref 0.76–1.27)
DEPRECATED RDW RBC AUTO: 48.6 FL (ref 37–54)
EOSINOPHIL # BLD AUTO: 0.1 10*3/MM3 (ref 0–0.4)
EOSINOPHIL NFR BLD AUTO: 2.4 % (ref 0.3–6.2)
ERYTHROCYTE [DISTWIDTH] IN BLOOD BY AUTOMATED COUNT: 15.9 % (ref 12.3–15.4)
GFR SERPL CREATININE-BSD FRML MDRD: 43 ML/MIN/1.73
GLOBULIN UR ELPH-MCNC: 3.2 GM/DL
GLUCOSE BLDC GLUCOMTR-MCNC: 152 MG/DL (ref 70–105)
GLUCOSE BLDC GLUCOMTR-MCNC: 164 MG/DL (ref 70–105)
GLUCOSE BLDC GLUCOMTR-MCNC: 168 MG/DL (ref 70–105)
GLUCOSE BLDC GLUCOMTR-MCNC: 190 MG/DL (ref 70–105)
GLUCOSE SERPL-MCNC: 177 MG/DL (ref 65–99)
HCT VFR BLD AUTO: 40.3 % (ref 37.5–51)
HGB BLD-MCNC: 13.8 G/DL (ref 13–17.7)
LYMPHOCYTES # BLD AUTO: 1 10*3/MM3 (ref 0.7–3.1)
LYMPHOCYTES NFR BLD AUTO: 16.6 % (ref 19.6–45.3)
MAGNESIUM SERPL-MCNC: 2.1 MG/DL (ref 1.6–2.4)
MCH RBC QN AUTO: 29.8 PG (ref 26.6–33)
MCHC RBC AUTO-ENTMCNC: 34.3 G/DL (ref 31.5–35.7)
MCV RBC AUTO: 86.9 FL (ref 79–97)
MONOCYTES # BLD AUTO: 0.7 10*3/MM3 (ref 0.1–0.9)
MONOCYTES NFR BLD AUTO: 11.2 % (ref 5–12)
NEUTROPHILS NFR BLD AUTO: 4.1 10*3/MM3 (ref 1.7–7)
NEUTROPHILS NFR BLD AUTO: 68.8 % (ref 42.7–76)
NRBC BLD AUTO-RTO: 0.1 /100 WBC (ref 0–0.2)
PHOSPHATE SERPL-MCNC: 2.5 MG/DL (ref 2.5–4.5)
PLATELET # BLD AUTO: 313 10*3/MM3 (ref 140–450)
PMV BLD AUTO: 8.4 FL (ref 6–12)
POTASSIUM SERPL-SCNC: 4.4 MMOL/L (ref 3.5–5.2)
PROT SERPL-MCNC: 6.4 G/DL (ref 6–8.5)
RBC # BLD AUTO: 4.64 10*6/MM3 (ref 4.14–5.8)
SODIUM SERPL-SCNC: 133 MMOL/L (ref 136–145)
WBC # BLD AUTO: 5.9 10*3/MM3 (ref 3.4–10.8)

## 2021-05-19 PROCEDURE — 94799 UNLISTED PULMONARY SVC/PX: CPT

## 2021-05-19 PROCEDURE — 25010000002 PIPERACILLIN SOD-TAZOBACTAM PER 1 G: Performed by: INTERNAL MEDICINE

## 2021-05-19 PROCEDURE — 82962 GLUCOSE BLOOD TEST: CPT

## 2021-05-19 PROCEDURE — 25010000002 PIPERACILLIN SOD-TAZOBACTAM PER 1 G: Performed by: FAMILY MEDICINE

## 2021-05-19 PROCEDURE — 99213 OFFICE O/P EST LOW 20 MIN: CPT | Performed by: SURGERY

## 2021-05-19 PROCEDURE — 80053 COMPREHEN METABOLIC PANEL: CPT | Performed by: INTERNAL MEDICINE

## 2021-05-19 PROCEDURE — 25010000002 HYDROMORPHONE PER 4 MG: Performed by: FAMILY MEDICINE

## 2021-05-19 PROCEDURE — 84100 ASSAY OF PHOSPHORUS: CPT | Performed by: INTERNAL MEDICINE

## 2021-05-19 PROCEDURE — 85025 COMPLETE CBC W/AUTO DIFF WBC: CPT | Performed by: FAMILY MEDICINE

## 2021-05-19 PROCEDURE — 63710000001 INSULIN LISPRO (HUMAN) PER 5 UNITS: Performed by: NURSE PRACTITIONER

## 2021-05-19 PROCEDURE — 82330 ASSAY OF CALCIUM: CPT | Performed by: INTERNAL MEDICINE

## 2021-05-19 PROCEDURE — 83735 ASSAY OF MAGNESIUM: CPT | Performed by: INTERNAL MEDICINE

## 2021-05-19 PROCEDURE — 25010000002 ONDANSETRON PER 1 MG: Performed by: FAMILY MEDICINE

## 2021-05-19 PROCEDURE — 97161 PT EVAL LOW COMPLEX 20 MIN: CPT

## 2021-05-19 PROCEDURE — 0097U HC BIOFIRE FILMARRAY GI PANEL: CPT | Performed by: FAMILY MEDICINE

## 2021-05-19 PROCEDURE — G0378 HOSPITAL OBSERVATION PER HR: HCPCS

## 2021-05-19 RX ORDER — IPRATROPIUM BROMIDE AND ALBUTEROL SULFATE 2.5; .5 MG/3ML; MG/3ML
3 SOLUTION RESPIRATORY (INHALATION)
Status: DISCONTINUED | OUTPATIENT
Start: 2021-05-19 | End: 2021-05-21 | Stop reason: HOSPADM

## 2021-05-19 RX ADMIN — HYDROMORPHONE HYDROCHLORIDE 1 MG: 2 INJECTION, SOLUTION INTRAMUSCULAR; INTRAVENOUS; SUBCUTANEOUS at 14:45

## 2021-05-19 RX ADMIN — INSULIN LISPRO 2 UNITS: 100 INJECTION, SOLUTION INTRAVENOUS; SUBCUTANEOUS at 12:01

## 2021-05-19 RX ADMIN — IPRATROPIUM BROMIDE AND ALBUTEROL SULFATE 3 ML: 2.5; .5 SOLUTION RESPIRATORY (INHALATION) at 20:27

## 2021-05-19 RX ADMIN — INSULIN LISPRO 2 UNITS: 100 INJECTION, SOLUTION INTRAVENOUS; SUBCUTANEOUS at 17:26

## 2021-05-19 RX ADMIN — SODIUM CHLORIDE 125 ML/HR: 9 INJECTION, SOLUTION INTRAVENOUS at 01:23

## 2021-05-19 RX ADMIN — HYDROMORPHONE HYDROCHLORIDE 1 MG: 2 INJECTION, SOLUTION INTRAMUSCULAR; INTRAVENOUS; SUBCUTANEOUS at 18:33

## 2021-05-19 RX ADMIN — SODIUM BICARBONATE 650 MG TABLET 650 MG: at 21:48

## 2021-05-19 RX ADMIN — IPRATROPIUM BROMIDE AND ALBUTEROL SULFATE 3 ML: 2.5; .5 SOLUTION RESPIRATORY (INHALATION) at 12:13

## 2021-05-19 RX ADMIN — SODIUM BICARBONATE 650 MG TABLET 650 MG: at 09:14

## 2021-05-19 RX ADMIN — SODIUM BICARBONATE 650 MG TABLET 650 MG: at 16:33

## 2021-05-19 RX ADMIN — INSULIN LISPRO 2 UNITS: 100 INJECTION, SOLUTION INTRAVENOUS; SUBCUTANEOUS at 21:57

## 2021-05-19 RX ADMIN — Medication 3 ML: at 09:13

## 2021-05-19 RX ADMIN — ONDANSETRON 4 MG: 2 INJECTION INTRAMUSCULAR; INTRAVENOUS at 22:28

## 2021-05-19 RX ADMIN — INSULIN LISPRO 2 UNITS: 100 INJECTION, SOLUTION INTRAVENOUS; SUBCUTANEOUS at 09:14

## 2021-05-19 RX ADMIN — HYDROMORPHONE HYDROCHLORIDE 1 MG: 2 INJECTION, SOLUTION INTRAMUSCULAR; INTRAVENOUS; SUBCUTANEOUS at 05:53

## 2021-05-19 RX ADMIN — ONDANSETRON 4 MG: 2 INJECTION INTRAMUSCULAR; INTRAVENOUS at 03:00

## 2021-05-19 RX ADMIN — HYDROMORPHONE HYDROCHLORIDE 1 MG: 2 INJECTION, SOLUTION INTRAMUSCULAR; INTRAVENOUS; SUBCUTANEOUS at 10:02

## 2021-05-19 RX ADMIN — PIPERACILLIN AND TAZOBACTAM 3.38 G: 3; .375 INJECTION, POWDER, LYOPHILIZED, FOR SOLUTION INTRAVENOUS at 03:00

## 2021-05-19 RX ADMIN — PIPERACILLIN AND TAZOBACTAM 3.38 G: 3; .375 INJECTION, POWDER, LYOPHILIZED, FOR SOLUTION INTRAVENOUS at 16:34

## 2021-05-19 RX ADMIN — HYDROMORPHONE HYDROCHLORIDE 1 MG: 2 INJECTION, SOLUTION INTRAMUSCULAR; INTRAVENOUS; SUBCUTANEOUS at 21:22

## 2021-05-19 RX ADMIN — Medication 3 ML: at 21:21

## 2021-05-19 RX ADMIN — Medication 10 MG: at 21:47

## 2021-05-19 RX ADMIN — ONDANSETRON 4 MG: 2 INJECTION INTRAMUSCULAR; INTRAVENOUS at 10:02

## 2021-05-19 RX ADMIN — ONDANSETRON 4 MG: 2 INJECTION INTRAMUSCULAR; INTRAVENOUS at 16:33

## 2021-05-19 RX ADMIN — HYDROMORPHONE HYDROCHLORIDE 1 MG: 2 INJECTION, SOLUTION INTRAMUSCULAR; INTRAVENOUS; SUBCUTANEOUS at 01:24

## 2021-05-20 LAB
ADV 40+41 DNA STL QL NAA+NON-PROBE: NOT DETECTED
ANION GAP SERPL CALCULATED.3IONS-SCNC: 12 MMOL/L (ref 5–15)
ASTRO TYP 1-8 RNA STL QL NAA+NON-PROBE: NOT DETECTED
BASOPHILS # BLD AUTO: 0.1 10*3/MM3 (ref 0–0.2)
BASOPHILS NFR BLD AUTO: 1 % (ref 0–1.5)
BUN SERPL-MCNC: 22 MG/DL (ref 8–23)
BUN/CREAT SERPL: 18 (ref 7–25)
C CAYETANENSIS DNA STL QL NAA+NON-PROBE: NOT DETECTED
C COLI+JEJ+UPSA DNA STL QL NAA+NON-PROBE: NOT DETECTED
C-ANCA TITR SER IF: NORMAL TITER
CALCIUM SPEC-SCNC: 8.3 MG/DL (ref 8.6–10.5)
CHLORIDE SERPL-SCNC: 102 MMOL/L (ref 98–107)
CO2 SERPL-SCNC: 22 MMOL/L (ref 22–29)
CREAT SERPL-MCNC: 1.22 MG/DL (ref 0.76–1.27)
CRYPTOSP DNA STL QL NAA+NON-PROBE: NOT DETECTED
DEPRECATED RDW RBC AUTO: 48.1 FL (ref 37–54)
E HISTOLYT DNA STL QL NAA+NON-PROBE: NOT DETECTED
EAEC PAA PLAS AGGR+AATA ST NAA+NON-PRB: NOT DETECTED
EC STX1+STX2 GENES STL QL NAA+NON-PROBE: NOT DETECTED
EOSINOPHIL # BLD AUTO: 0.1 10*3/MM3 (ref 0–0.4)
EOSINOPHIL NFR BLD AUTO: 1.1 % (ref 0.3–6.2)
EPEC EAE GENE STL QL NAA+NON-PROBE: NOT DETECTED
ERYTHROCYTE [DISTWIDTH] IN BLOOD BY AUTOMATED COUNT: 15.8 % (ref 12.3–15.4)
ETEC LTA+ST1A+ST1B TOX ST NAA+NON-PROBE: NOT DETECTED
G LAMBLIA DNA STL QL NAA+NON-PROBE: NOT DETECTED
GFR SERPL CREATININE-BSD FRML MDRD: 58 ML/MIN/1.73
GLUCOSE BLDC GLUCOMTR-MCNC: 151 MG/DL (ref 70–105)
GLUCOSE BLDC GLUCOMTR-MCNC: 158 MG/DL (ref 70–105)
GLUCOSE BLDC GLUCOMTR-MCNC: 188 MG/DL (ref 70–105)
GLUCOSE BLDC GLUCOMTR-MCNC: 200 MG/DL (ref 70–105)
GLUCOSE SERPL-MCNC: 168 MG/DL (ref 65–99)
HCT VFR BLD AUTO: 36.5 % (ref 37.5–51)
HGB BLD-MCNC: 12.3 G/DL (ref 13–17.7)
LYMPHOCYTES # BLD AUTO: 0.7 10*3/MM3 (ref 0.7–3.1)
LYMPHOCYTES NFR BLD AUTO: 12.8 % (ref 19.6–45.3)
MCH RBC QN AUTO: 29.6 PG (ref 26.6–33)
MCHC RBC AUTO-ENTMCNC: 33.8 G/DL (ref 31.5–35.7)
MCV RBC AUTO: 87.6 FL (ref 79–97)
MONOCYTES # BLD AUTO: 0.6 10*3/MM3 (ref 0.1–0.9)
MONOCYTES NFR BLD AUTO: 10.4 % (ref 5–12)
MYELOPEROXIDASE AB SER IA-ACNC: <9 U/ML (ref 0–9)
NEUTROPHILS NFR BLD AUTO: 4.1 10*3/MM3 (ref 1.7–7)
NEUTROPHILS NFR BLD AUTO: 74.7 % (ref 42.7–76)
NOROVIRUS GI+II RNA STL QL NAA+NON-PROBE: NOT DETECTED
NRBC BLD AUTO-RTO: 0.1 /100 WBC (ref 0–0.2)
P SHIGELLOIDES DNA STL QL NAA+NON-PROBE: NOT DETECTED
P-ANCA ATYPICAL TITR SER IF: NORMAL TITER
P-ANCA TITR SER IF: NORMAL TITER
PLATELET # BLD AUTO: 286 10*3/MM3 (ref 140–450)
PMV BLD AUTO: 8.2 FL (ref 6–12)
POTASSIUM SERPL-SCNC: 4.6 MMOL/L (ref 3.5–5.2)
PROTEINASE3 AB SER IA-ACNC: <3.5 U/ML (ref 0–3.5)
RBC # BLD AUTO: 4.16 10*6/MM3 (ref 4.14–5.8)
RVA RNA STL QL NAA+NON-PROBE: NOT DETECTED
S ENT+BONG DNA STL QL NAA+NON-PROBE: NOT DETECTED
SAPO I+II+IV+V RNA STL QL NAA+NON-PROBE: NOT DETECTED
SHIGELLA SP+EIEC IPAH ST NAA+NON-PROBE: NOT DETECTED
SODIUM SERPL-SCNC: 136 MMOL/L (ref 136–145)
V CHOL+PARA+VUL DNA STL QL NAA+NON-PROBE: NOT DETECTED
V CHOLERAE DNA STL QL NAA+NON-PROBE: NOT DETECTED
WBC # BLD AUTO: 5.6 10*3/MM3 (ref 3.4–10.8)
Y ENTEROCOL DNA STL QL NAA+NON-PROBE: NOT DETECTED

## 2021-05-20 PROCEDURE — 25010000002 HYDROMORPHONE PER 4 MG: Performed by: FAMILY MEDICINE

## 2021-05-20 PROCEDURE — 63710000001 INSULIN LISPRO (HUMAN) PER 5 UNITS: Performed by: NURSE PRACTITIONER

## 2021-05-20 PROCEDURE — 85025 COMPLETE CBC W/AUTO DIFF WBC: CPT | Performed by: FAMILY MEDICINE

## 2021-05-20 PROCEDURE — 80048 BASIC METABOLIC PNL TOTAL CA: CPT | Performed by: FAMILY MEDICINE

## 2021-05-20 PROCEDURE — 94799 UNLISTED PULMONARY SVC/PX: CPT

## 2021-05-20 PROCEDURE — G0378 HOSPITAL OBSERVATION PER HR: HCPCS

## 2021-05-20 PROCEDURE — 82962 GLUCOSE BLOOD TEST: CPT

## 2021-05-20 PROCEDURE — 25010000002 PIPERACILLIN SOD-TAZOBACTAM PER 1 G: Performed by: FAMILY MEDICINE

## 2021-05-20 PROCEDURE — 99213 OFFICE O/P EST LOW 20 MIN: CPT | Performed by: SURGERY

## 2021-05-20 PROCEDURE — 25010000002 PIPERACILLIN SOD-TAZOBACTAM PER 1 G: Performed by: INTERNAL MEDICINE

## 2021-05-20 PROCEDURE — 25010000002 ONDANSETRON PER 1 MG: Performed by: FAMILY MEDICINE

## 2021-05-20 RX ORDER — SODIUM CHLORIDE 9 MG/ML
30 INJECTION, SOLUTION INTRAVENOUS CONTINUOUS
Status: DISCONTINUED | OUTPATIENT
Start: 2021-05-20 | End: 2021-05-21 | Stop reason: HOSPADM

## 2021-05-20 RX ORDER — TRAZODONE HYDROCHLORIDE 100 MG/1
100 TABLET ORAL NIGHTLY
Status: DISCONTINUED | OUTPATIENT
Start: 2021-05-20 | End: 2021-05-21 | Stop reason: HOSPADM

## 2021-05-20 RX ADMIN — HYDROMORPHONE HYDROCHLORIDE 1 MG: 2 INJECTION, SOLUTION INTRAMUSCULAR; INTRAVENOUS; SUBCUTANEOUS at 08:22

## 2021-05-20 RX ADMIN — HYDROMORPHONE HYDROCHLORIDE 1 MG: 2 INJECTION, SOLUTION INTRAMUSCULAR; INTRAVENOUS; SUBCUTANEOUS at 23:43

## 2021-05-20 RX ADMIN — PIPERACILLIN AND TAZOBACTAM 3.38 G: 3; .375 INJECTION, POWDER, LYOPHILIZED, FOR SOLUTION INTRAVENOUS at 05:07

## 2021-05-20 RX ADMIN — SODIUM BICARBONATE 650 MG TABLET 650 MG: at 08:20

## 2021-05-20 RX ADMIN — Medication 10 MG: at 22:43

## 2021-05-20 RX ADMIN — HYDROMORPHONE HYDROCHLORIDE 1 MG: 2 INJECTION, SOLUTION INTRAMUSCULAR; INTRAVENOUS; SUBCUTANEOUS at 01:15

## 2021-05-20 RX ADMIN — Medication 3 ML: at 22:44

## 2021-05-20 RX ADMIN — HYDROMORPHONE HYDROCHLORIDE 1 MG: 2 INJECTION, SOLUTION INTRAMUSCULAR; INTRAVENOUS; SUBCUTANEOUS at 11:12

## 2021-05-20 RX ADMIN — Medication 3 ML: at 10:11

## 2021-05-20 RX ADMIN — PIPERACILLIN AND TAZOBACTAM 3.38 G: 3; .375 INJECTION, POWDER, LYOPHILIZED, FOR SOLUTION INTRAVENOUS at 15:55

## 2021-05-20 RX ADMIN — INSULIN LISPRO 2 UNITS: 100 INJECTION, SOLUTION INTRAVENOUS; SUBCUTANEOUS at 22:51

## 2021-05-20 RX ADMIN — SODIUM BICARBONATE 650 MG TABLET 650 MG: at 15:54

## 2021-05-20 RX ADMIN — INSULIN LISPRO 2 UNITS: 100 INJECTION, SOLUTION INTRAVENOUS; SUBCUTANEOUS at 12:06

## 2021-05-20 RX ADMIN — ONDANSETRON 4 MG: 2 INJECTION INTRAMUSCULAR; INTRAVENOUS at 21:17

## 2021-05-20 RX ADMIN — INSULIN LISPRO 4 UNITS: 100 INJECTION, SOLUTION INTRAVENOUS; SUBCUTANEOUS at 16:53

## 2021-05-20 RX ADMIN — INSULIN LISPRO 2 UNITS: 100 INJECTION, SOLUTION INTRAVENOUS; SUBCUTANEOUS at 08:19

## 2021-05-20 RX ADMIN — HYDROMORPHONE HYDROCHLORIDE 1 MG: 2 INJECTION, SOLUTION INTRAMUSCULAR; INTRAVENOUS; SUBCUTANEOUS at 14:07

## 2021-05-20 RX ADMIN — IPRATROPIUM BROMIDE AND ALBUTEROL SULFATE 3 ML: 2.5; .5 SOLUTION RESPIRATORY (INHALATION) at 11:06

## 2021-05-20 RX ADMIN — TRAZODONE HYDROCHLORIDE 100 MG: 100 TABLET ORAL at 01:13

## 2021-05-20 RX ADMIN — TRAZODONE HYDROCHLORIDE 100 MG: 100 TABLET ORAL at 22:43

## 2021-05-20 RX ADMIN — IPRATROPIUM BROMIDE AND ALBUTEROL SULFATE 3 ML: 2.5; .5 SOLUTION RESPIRATORY (INHALATION) at 15:45

## 2021-05-20 RX ADMIN — SODIUM BICARBONATE 650 MG TABLET 650 MG: at 21:09

## 2021-05-20 RX ADMIN — HYDROMORPHONE HYDROCHLORIDE 1 MG: 2 INJECTION, SOLUTION INTRAMUSCULAR; INTRAVENOUS; SUBCUTANEOUS at 21:09

## 2021-05-20 RX ADMIN — HYDROMORPHONE HYDROCHLORIDE 1 MG: 2 INJECTION, SOLUTION INTRAMUSCULAR; INTRAVENOUS; SUBCUTANEOUS at 05:15

## 2021-05-20 RX ADMIN — HYDROMORPHONE HYDROCHLORIDE 1 MG: 2 INJECTION, SOLUTION INTRAMUSCULAR; INTRAVENOUS; SUBCUTANEOUS at 19:03

## 2021-05-20 RX ADMIN — HYDROMORPHONE HYDROCHLORIDE 1 MG: 2 INJECTION, SOLUTION INTRAMUSCULAR; INTRAVENOUS; SUBCUTANEOUS at 16:54

## 2021-05-20 RX ADMIN — SODIUM CHLORIDE 30 ML/HR: 9 INJECTION, SOLUTION INTRAVENOUS at 10:12

## 2021-05-21 VITALS
BODY MASS INDEX: 38.37 KG/M2 | WEIGHT: 289.5 LBS | OXYGEN SATURATION: 97 % | DIASTOLIC BLOOD PRESSURE: 77 MMHG | RESPIRATION RATE: 22 BRPM | HEART RATE: 69 BPM | TEMPERATURE: 97.5 F | SYSTOLIC BLOOD PRESSURE: 132 MMHG | HEIGHT: 73 IN

## 2021-05-21 LAB
BASOPHILS # BLD AUTO: 0.1 10*3/MM3 (ref 0–0.2)
BASOPHILS NFR BLD AUTO: 1.2 % (ref 0–1.5)
DEPRECATED RDW RBC AUTO: 47.3 FL (ref 37–54)
EOSINOPHIL # BLD AUTO: 0.1 10*3/MM3 (ref 0–0.4)
EOSINOPHIL NFR BLD AUTO: 2.3 % (ref 0.3–6.2)
ERYTHROCYTE [DISTWIDTH] IN BLOOD BY AUTOMATED COUNT: 15.5 % (ref 12.3–15.4)
GLUCOSE BLDC GLUCOMTR-MCNC: 143 MG/DL (ref 70–105)
HCT VFR BLD AUTO: 37.2 % (ref 37.5–51)
HGB BLD-MCNC: 12.4 G/DL (ref 13–17.7)
LYMPHOCYTES # BLD AUTO: 1 10*3/MM3 (ref 0.7–3.1)
LYMPHOCYTES NFR BLD AUTO: 19.1 % (ref 19.6–45.3)
MCH RBC QN AUTO: 28.8 PG (ref 26.6–33)
MCHC RBC AUTO-ENTMCNC: 33.3 G/DL (ref 31.5–35.7)
MCV RBC AUTO: 86.6 FL (ref 79–97)
MONOCYTES # BLD AUTO: 0.6 10*3/MM3 (ref 0.1–0.9)
MONOCYTES NFR BLD AUTO: 11.5 % (ref 5–12)
NEUTROPHILS NFR BLD AUTO: 3.5 10*3/MM3 (ref 1.7–7)
NEUTROPHILS NFR BLD AUTO: 65.9 % (ref 42.7–76)
NRBC BLD AUTO-RTO: 0 /100 WBC (ref 0–0.2)
PLATELET # BLD AUTO: 270 10*3/MM3 (ref 140–450)
PMV BLD AUTO: 8.2 FL (ref 6–12)
RBC # BLD AUTO: 4.29 10*6/MM3 (ref 4.14–5.8)
WBC # BLD AUTO: 5.3 10*3/MM3 (ref 3.4–10.8)

## 2021-05-21 PROCEDURE — 94799 UNLISTED PULMONARY SVC/PX: CPT

## 2021-05-21 PROCEDURE — 25010000002 ONDANSETRON PER 1 MG: Performed by: FAMILY MEDICINE

## 2021-05-21 PROCEDURE — 82962 GLUCOSE BLOOD TEST: CPT

## 2021-05-21 PROCEDURE — 85025 COMPLETE CBC W/AUTO DIFF WBC: CPT | Performed by: FAMILY MEDICINE

## 2021-05-21 PROCEDURE — 25010000002 HYDROMORPHONE PER 4 MG: Performed by: FAMILY MEDICINE

## 2021-05-21 RX ADMIN — HYDROMORPHONE HYDROCHLORIDE 1 MG: 2 INJECTION, SOLUTION INTRAMUSCULAR; INTRAVENOUS; SUBCUTANEOUS at 02:47

## 2021-05-21 RX ADMIN — IPRATROPIUM BROMIDE AND ALBUTEROL SULFATE 3 ML: 2.5; .5 SOLUTION RESPIRATORY (INHALATION) at 07:54

## 2021-05-21 RX ADMIN — Medication 3 ML: at 08:38

## 2021-05-21 RX ADMIN — HYDROMORPHONE HYDROCHLORIDE 1 MG: 2 INJECTION, SOLUTION INTRAMUSCULAR; INTRAVENOUS; SUBCUTANEOUS at 07:10

## 2021-05-21 RX ADMIN — SODIUM BICARBONATE 650 MG TABLET 650 MG: at 08:35

## 2021-05-21 RX ADMIN — HYDROMORPHONE HYDROCHLORIDE 1 MG: 2 INJECTION, SOLUTION INTRAMUSCULAR; INTRAVENOUS; SUBCUTANEOUS at 05:08

## 2021-05-21 RX ADMIN — ONDANSETRON 4 MG: 2 INJECTION INTRAMUSCULAR; INTRAVENOUS at 09:35

## 2021-05-21 RX ADMIN — HYDROMORPHONE HYDROCHLORIDE 1 MG: 2 INJECTION, SOLUTION INTRAMUSCULAR; INTRAVENOUS; SUBCUTANEOUS at 09:35

## 2021-05-22 ENCOUNTER — READMISSION MANAGEMENT (OUTPATIENT)
Dept: CALL CENTER | Facility: HOSPITAL | Age: 76
End: 2021-05-22

## 2021-05-22 NOTE — OUTREACH NOTE
Prep Survey      Responses   [facility] from?  [facility]   Is LACE score < 7 ?  No   Emergency Room discharge w/ pulse ox?  No   Eligibility  Readm Mgmt   Discharge diagnosis  SBO,  ARF/CKD   Does the patient have one of the following disease processes/diagnoses(primary or secondary)?  Other   Does the patient have Home health ordered?  Yes   What is the Home health agency?   [facility]   Is there a DME ordered?  No   Comments regarding appointments  Per AVS   Medication alerts for this patient  see AVS for changes   Prep survey completed?  Yes          Akilah Garber RN

## 2021-05-25 ENCOUNTER — CLINICAL SUPPORT NO REQUIREMENTS (OUTPATIENT)
Dept: CARDIOLOGY | Facility: CLINIC | Age: 76
End: 2021-05-25

## 2021-05-25 ENCOUNTER — OFFICE VISIT (OUTPATIENT)
Dept: CARDIOLOGY | Facility: CLINIC | Age: 76
End: 2021-05-25

## 2021-05-25 VITALS
OXYGEN SATURATION: 98 % | WEIGHT: 291 LBS | SYSTOLIC BLOOD PRESSURE: 149 MMHG | HEART RATE: 81 BPM | HEIGHT: 73 IN | BODY MASS INDEX: 38.57 KG/M2 | DIASTOLIC BLOOD PRESSURE: 84 MMHG

## 2021-05-25 DIAGNOSIS — E78.5 DYSLIPIDEMIA: ICD-10-CM

## 2021-05-25 DIAGNOSIS — I25.118 CORONARY ARTERY DISEASE OF NATIVE ARTERY OF NATIVE HEART WITH STABLE ANGINA PECTORIS (HCC): ICD-10-CM

## 2021-05-25 DIAGNOSIS — Z95.0 PRESENCE OF CARDIAC PACEMAKER: Primary | ICD-10-CM

## 2021-05-25 DIAGNOSIS — Z95.820 STATUS POST ANGIOPLASTY WITH STENT: ICD-10-CM

## 2021-05-25 DIAGNOSIS — Z95.0 PRESENCE OF CARDIAC PACEMAKER: ICD-10-CM

## 2021-05-25 DIAGNOSIS — I44.2 AV BLOCK, COMPLETE (HCC): ICD-10-CM

## 2021-05-25 DIAGNOSIS — I10 ESSENTIAL HYPERTENSION: Primary | ICD-10-CM

## 2021-05-25 DIAGNOSIS — I48.21 PERMANENT ATRIAL FIBRILLATION (HCC): ICD-10-CM

## 2021-05-25 PROCEDURE — 99204 OFFICE O/P NEW MOD 45 MIN: CPT | Performed by: INTERNAL MEDICINE

## 2021-05-25 PROCEDURE — 93280 PM DEVICE PROGR EVAL DUAL: CPT | Performed by: INTERNAL MEDICINE

## 2021-05-25 NOTE — PROGRESS NOTES
Encounter Date:05/25/2021  Transfer of care.      Patient ID: Ro Nathan is a 75 y.o. male.    Chief Complaint:  Status post stent  Status post pacemaker  Diabetes  Dyslipidemia  Hypertension  COPD  Atrial fibrillation    History of Present Illness  The patient is a 75-year-old white male is here for cardiovascular evaluation and follow-up.  Patient has multiple and complex medical problems as outlined below.  Patient has not been seen by cardiologist for a while and patient is seen for follow-up and evaluation.  Review of the records is extensive.    Patient is asymptomatic at this time.    Since I have last seen, the patient has been without any chest discomfort ,shortness of breath, palpitations, dizziness or syncope.  Denies having any headache ,abdominal pain ,nausea, vomiting , diarrhea constipation, loss of weight or loss of appetite.  Denies having any excessive bruising ,hematuria or blood in the stool.    Patient recently was admitted with right foot ulcer.    Review of all systems negative except as indicated.    Reviewed ROS.      Assessment and Plan     ]]]]]]]]]]]]]]]]]]]]]  Impression  ========  -Status post stent placement (details not available)  Status post stent to proximal LAD 12/5/2019.  Patient had stent to circumflex and RCA in the past.    -Status post permanent ventricular pacemaker implantation ([employer] 1/13/2015)  Battery status is 4.5 years.    -Dyslipidemia diabetes hypertension COPD CKD 3.  History of cirrhosis    -Chronic atrial fibrillation    -History of right foot ulceration with bone involvement.    -Peripheral vascular disease    -Large abdominal ventral hernia.    -Status post appendectomy appendectomy cholecystectomy left elbow surgery hernia repair    -Allergic to morphine pantoprazole haloperidol  ===========  Plan  ========  Patient has multiple and complex cardiac and noncardiac problems.  Patient had interrogation of the pacemaker today revealed good pacing  parameters with battery status of 4.5 years.  Patient is almost 99.7% paced in the ventricle.    Patient has chronic atrial fibrillation.  Extensive review of the medications did not reveal any anticoagulation.  We will discuss with him when he returns to the office in 4 weeks.  Review of the records indicated that patient has been off anticoagulation due to GIB.    Status post stent.  Patient is not having any angina pectoris or congestive heart failure.    Hypertension-149/84.    Dyslipidemia-continue atorvastatin    Following cath report was reviewed from old records and will summarize later.    Follow-up in the office in 4 weeks.        Cardiac cath report 12/5/2019 Dr. Munson  Indication for procedure:     1.  Unstable angina  2.  Known coronary artery disease status post stenting to the LAD circumflex and right coronary artery in the past   3. multiple coronary artery risk factors     Procedures performed:     1.  Native coronary arteriography  2.  Left Heart catheterization  3.  Left ventriculography  4.  Intravascular ultrasound guided PCI to the LAD  5.  Percutaneous coronary intervention involving drug-eluting stent placement to the ostial proximal LAD (3.5 x 33 mm Xience drug-eluting stent)     Description of procedure:     Patient had the risks and benefits of the procedure explained to them in detail after which informed consent was obtained.  Patient was then brought to the cardiac catheterization lab and placed on the table in supine position.  Next the right groin was prepped and draped in sterile fashion.  Next using modified Seldinger technique and a 21-gauge micro puncture needle, the femoral artery was cannulated without difficulty and a 6 Cymraes was inserted and flushed with heparinized saline.  Next using diagnostic 6 Cymraes JR4 and JL4 catheters, each advanced over an 0.035 guidewire to the level the aortic root and then used to selectively engage the respective coronary ostia, multiple  cineangiographic images were obtained all the appropriate projections using hand-injection of nonionic contrast sterile.  Before removal of the JR4 catheter the JR4 catheter used across the aortic valve in the left ventricle for left heart catheterization left ventriculography.  The catheter was then pulled back and removed.     The culprit was identified is an 80% focal in-stent restenotic lesion in the proximal LAD despite an additional severe lesion in the second obtuse marginal branch of the circumflex artery.  Therefore we decided to focus on the LAD lesion.  Using a 6 [place] EBU 4 guide catheter and a whisper export wire we wired the LAD with little difficulty.  We then performed intravascular ultrasound guided evaluation of the ostial left main and LAD lesion and also to determine reference vessel size diameter.  Following intravascular ultrasound we proceeded with predilatation of the LAD using a 3.5 x 10 mm [place] cutting balloon in the mid proximal and proximal in-stent restenotic lesion with inflation to a maximum of 18 xin within the stented segment.  The lesion was reduced to 30%.  Stenting was then performed of the angioplastied segment using a 3.5 x 33 mm Xience drug-eluting stent deployed at 14 xin.  Postdilatation was performed along the vessel with a 3.5 mm NC trek balloon given the reference vessel diameter of 3.5 mm to 2 greater than 3.5 mm with 18 xin of pressure increasing to 20 xin of pressure in the ostial proximal portion of the stented segment.  Post stenting intravascular ultrasound revealed no edge dissection with excellent stent apposition and 0% residual stenosis.  We achieved THOR-3 flow prior and THOR-3 flow following PCI.  The guide catheter and wire were then removed     Patient tolerated the procedure well there were no complications.     During the case, conscious sedation monitoring was more than 30 min.     At the end of the case a 6 [place] Gaby device was deployed in the  right groin for right groin hemostasis     Findings     Left heart catheterization:     1.  The opening aortic pressure was 181/81 mmHg with a mean pressure of 119 mmHg  2.  The left ventricular end-diastolic pressure at end expiration was 4 to 8 mmHg  3.  There is no gradient across aortic valve on pullback     Left ventriculography     The overall estimated left ventricular ejection fraction was 65%.     Native coronary arteriography: Left dominant circulation     1.  Left main coronary arteries a large-caliber vessel gives rise left anterior descending left circumflex flex arteries.  There is an ostial eccentric 40% lesion that appears angiographically to approach 50%. No significant coronary atherosclerotic disease present.  2.  Left anterior descending artery is a large-caliber vessel that gives rise to large caliber diagonal branches and courses along the anterior interventricular groove and wraps around the apex.  There is a proximal eccentric 50% lesion within an in-stent restenotic segment followed by an greater than 80% focal eccentric in-stent restenotic lesion followed by a 60% lesion after the stented segment and otherwise no coronary atherosclerotic disease of any significance present.  3.  The left circumflex arteries a large-caliber vessel gives rise to large caliber bifurcating obtuse marginal branch.  There is an ostial proximal concentric 50% lesion that is calcified that immediately gives rise to a very high first obtuse marginal branch that is best classified as a ramus intermedius branch supplying large territory in the anterolateral wall that has an ostial proximal 70% lesion.  The calcified continuing circumflex and the posterior AV groove gives a second obtuse marginal branch that has mild diffuse disease, a third obtuse marginal branch and then a bifurcating system that has a bifurcation stenting within it before giving rise to the left PDA.  There is diffuse 50% in-stent restenosis within  this bifurcation stented segment into the obtuse marginal branch which itself has diffuse 50 to 60% disease within the stented segment.  There is a concentric 80% lesion in the circumflex PDA.  No significant coronary atherosclerotic disease present  4.  The right coronary arteries a large-caliber nondominant vessel has a stented mid segment after which there several acute marginal branches there is 50% in-stent restenosis approaching 60% at some portions within this nondominant right proximal to mid proximal segment.     Conclusions:     1.  Normal left heart filling pressures with preserved LV systolic function  2.  Normal epicardial anatomy with severe two-vessel coronary artery disease involving what is likely the culprit for the patient's chest pain syndrome, namely the very severe proximal LAD lesions.  We will treat the remaining circumflex lesions with medical therapy and see if the patient's symptoms resolved; this includes the severe lesion within the proximal portion of the high obtuse marginal branch within the circumflex system as well.     Recommendations:     Optimize medical therapy for secondary prevention of ischemic heart disease.               Diagnosis Plan   1. Essential hypertension     2. Permanent atrial fibrillation (CMS/[facility])     3. Presence of cardiac pacemaker     4. Coronary artery disease of native artery of native heart with stable angina pectoris (CMS/[facility])     5. Status post angioplasty with stent     6. Dyslipidemia     LAB RESULTS (LAST 7 DAYS)    CBC  Results from last 7 days   Lab Units 05/21/21  0302 05/20/21  0226 05/19/21  0716   WBC 10*3/mm3 5.30 5.60 5.90   RBC 10*6/mm3 4.29 4.16 4.64   HEMOGLOBIN g/dL 12.4* 12.3* 13.8   HEMATOCRIT % 37.2* 36.5* 40.3   MCV fL 86.6 87.6 86.9   PLATELETS 10*3/mm3 270 286 313       BMP  Results from last 7 days   Lab Units 05/20/21  0226 05/19/21  0716   SODIUM mmol/L 136 133*   POTASSIUM mmol/L 4.6 4.4   CHLORIDE mmol/L 102 100   CO2 mmol/L  22.0 21.0*   BUN mg/dL 22 32*   CREATININE mg/dL 1.22 1.57*   GLUCOSE mg/dL 168* 177*   MAGNESIUM mg/dL  --  2.1   PHOSPHORUS mg/dL  --  2.5       CMP   Results from last 7 days   Lab Units 05/20/21  0226 05/19/21  0716   SODIUM mmol/L 136 133*   POTASSIUM mmol/L 4.6 4.4   CHLORIDE mmol/L 102 100   CO2 mmol/L 22.0 21.0*   BUN mg/dL 22 32*   CREATININE mg/dL 1.22 1.57*   GLUCOSE mg/dL 168* 177*   ALBUMIN g/dL  --  3.20*   BILIRUBIN mg/dL  --  0.7   ALK PHOS U/L  --  394*   AST (SGOT) U/L  --  72*   ALT (SGPT) U/L  --  78*         BNP        TROPONIN        CoAg        Creatinine Clearance  Estimated Creatinine Clearance: 75.9 mL/min (by C-G formula based on SCr of 1.22 mg/dL).    ABG        Radiology  No radiology results for the last day                The following portions of the patient's history were reviewed and updated as appropriate: allergies, current medications, past family history, past medical history, past social history, past surgical history and problem list.    Review of Systems   Constitutional: Negative for malaise/fatigue.   Cardiovascular: Negative for chest pain, leg swelling, palpitations and syncope.   Respiratory: Negative for shortness of breath.    Skin: Negative for rash.   Gastrointestinal: Negative for nausea and vomiting.   Neurological: Negative for dizziness, light-headedness and numbness.         Current Outpatient Medications:   •  aspirin 81 MG EC tablet, Take 81 mg by mouth Daily., Disp: , Rfl:   •  atorvastatin (LIPITOR) 40 MG tablet, Take 1 tablet by mouth Every Night., Disp: 30 tablet, Rfl: 1  •  budesonide (PULMICORT) 0.5 MG/2ML nebulizer solution, Take 0.5 mg by nebulization 2 (Two) Times a Day As Needed., Disp: , Rfl:   •  clopidogrel (PLAVIX) 75 MG tablet, Take 75 mg by mouth Daily., Disp: , Rfl:   •  Ergocalciferol (VITAMIN D2 PO), Take 1,250 mg by mouth 2 (Two) Times a Week., Disp: , Rfl:   •  furosemide (LASIX) 20 MG tablet, Take 20 mg by mouth Daily. Do not take dos,  Disp: , Rfl:   •  ipratropium-albuterol (DUO-NEB) 0.5-2.5 mg/3 ml nebulizer, 3 mL 4 (Four) Times a Day As Needed., Disp: , Rfl:   •  losartan (COZAAR) 50 MG tablet, Take 50 mg by mouth Every Morning. Do not take 24 hours prior to surgery.  LD 1-4-2021 before 1245, Disp: , Rfl:   •  Lyrica 150 MG capsule, Take 1 capsule by mouth 2 (Two) Times a Day., Disp: 60 capsule, Rfl: 2  •  metoprolol tartrate (LOPRESSOR) 25 MG tablet, Take 1 tablet by mouth Every 12 (Twelve) Hours. (Patient taking differently: Take 25 mg by mouth 2 (Two) Times a Day With Meals.), Disp: 60 tablet, Rfl: 1  •  mirtazapine (REMERON) 30 MG tablet, Take 30 mg by mouth Every Night., Disp: , Rfl:   •  nitroglycerin (NITROSTAT) 0.4 MG SL tablet, Place 0.4 mg under the tongue Every 5 (Five) Minutes As Needed for Chest Pain., Disp: , Rfl:   •  oxyCODONE-acetaminophen (PERCOCET)  MG per tablet, Take 1 tablet by mouth Every 4 (Four) Hours As Needed for Moderate Pain ., Disp: , Rfl:   •  Perforomist 20 MCG/2ML nebulizer solution, 20 mcg 2 (Two) Times a Day., Disp: , Rfl:   •  potassium chloride (K-DUR) 10 MEQ CR tablet, Take 10 mEq by mouth 3 (Three) Times a Day With Meals. Take dos, Disp: , Rfl:   •  rOPINIRole (REQUIP) 0.5 MG tablet, Take 1 tablet by mouth Every 12 (Twelve) Hours. Take 1 hour before bedtime. (Patient taking differently: Take 0.5 mg by mouth Daily. Take 1 hour before bedtime.), Disp: 14 tablet, Rfl: 0  •  traZODone (DESYREL) 100 MG tablet, Take 100 mg by mouth Every Night., Disp: , Rfl:   •  VENTOLIN  (90 Base) MCG/ACT inhaler, Inhale 2 puffs Every 6 (Six) Hours As Needed for Wheezing or Shortness of Air. Use or bring dos, Disp: , Rfl:     Allergies   Allergen Reactions   • Haloperidol Hives   • Morphine Itching   • Pantoprazole Other (See Comments)     Feels sick after receiving       Family History   Problem Relation Age of Onset   • Alzheimer's disease Mother    • GI problems Father    • Heart disease Father        Past  Surgical History:   Procedure Laterality Date   • ABDOMINAL SURGERY     • APPENDECTOMY     • BONE CURETTAGE Right 1/22/2021    Procedure: Wound excision with fifth metatarsal head resection, right foot.;  Surgeon: Josef Egan DPM;  Location: [facility] MAIN OR;  Service: Podiatry;  Laterality: Right;   • BONE INCISION AND DRAINAGE Right 10/5/2020    Procedure: Incision and drainage with debridement of all nonviable soft tissue and bone with bone biopsy, right foot.;  Surgeon: Josef Egan DPM;  Location: [facility] MAIN OR;  Service: Podiatry;  Laterality: Right;   • CARDIAC CATHETERIZATION N/A 12/5/2019    Procedure: Left Heart Cath;  Surgeon: Mirza Munson MD;  Location: [facility] CATH INVASIVE LOCATION;  Service: Cardiology   • CARDIAC CATHETERIZATION N/A 12/5/2019    Procedure: Stent MICHAEL coronary;  Surgeon: Mirza Munson MD;  Location: [facility] CATH INVASIVE LOCATION;  Service: Cardiology   • CHOLECYSTECTOMY     • ELBOW PROCEDURE      left   • FOOT SURGERY      right foot   • HERNIA REPAIR     • INCISION AND DRAINAGE OF WOUND Right 4/6/2021    Procedure: INCISION AND DRAINAGE OF RIGHT FOOT 5TH METATARSAL TO BONE AND PARTIAL 5TH METATARSAL RESECTION;  Surgeon: Josef Egan DPM;  Location: [facility] MAIN OR;  Service: Podiatry;  Laterality: Right;   • INCISION AND DRAINAGE OF WOUND Right 4/8/2021    Procedure: INCISION AND DRAINAGE BONE, DELAYED PRIMARY CLOSURE;  Surgeon: Josef Egan DPM;  Location: [facility] MAIN OR;  Service: Podiatry;  Laterality: Right;   • INTERVENTIONAL RADIOLOGY PROCEDURE N/A 12/5/2019    Procedure: Intravascular Ultrasound;  Surgeon: Mirza Munson MD;  Location: [facility] CATH INVASIVE LOCATION;  Service: Cardiology   • PACEMAKER IMPLANTATION     • FL RT/LT HEART CATHETERS N/A 12/5/2019    Procedure: Percutaneous Coronary Intervention;  Surgeon: Mirza Munson MD;  Location: [facility] CATH INVASIVE LOCATION;  Service: Cardiology   • WOUND  DEBRIDEMENT Right 10/29/2020    Procedure: Preparation and debridement of foot wound with application of Integra wound graft, right foot.;  Surgeon: Josef Egan DPM;  Location: [facility];  Service: Podiatry;  Laterality: Right;       Past Medical History:   Diagnosis Date   • Alcoholic cirrhosis of liver with ascites (CMS/[facility]) 10/26/2020   • Benign hypertension with CKD (chronic kidney disease) stage III (CMS/[facility]) 10/26/2020   • Bruises easily    • CHF (congestive heart failure) (CMS/[facility])    • Chronic bronchitis with COPD (chronic obstructive pulmonary disease) (CMS/[facility]) 10/26/2020   • Chronic respiratory failure with hypoxia (CMS/[facility]) 10/26/2020   • Congenital heart defect    • Difficulty attaining erection    • Hypertension    • Low back pain    • Pacemaker    • Peripheral vascular disease due to secondary diabetes (CMS/[facility]) 10/26/2020   • Poor circulation    • Prostate cancer (CMS/[facility])     prostate   • Shortness of breath    • Sleep apnea     using CPAP    • Stage 3a chronic kidney disease (CMS/[facility]) 10/26/2020   • Stented coronary artery 2019    MICHAEL to LAD   • Type 2 diabetes, controlled, with neuropathy (CMS/[facility]) 10/26/2020    no meds   • Type 2 diabetes, controlled, with neuropathy (CMS/[facility]) 10/26/2020       Family History   Problem Relation Age of Onset   • Alzheimer's disease Mother    • GI problems Father    • Heart disease Father        Social History     Socioeconomic History   • Marital status:      Spouse name: Not on file   • Number of children: Not on file   • Years of education: Not on file   • Highest education level: Not on file   Tobacco Use   • Smoking status: Former Smoker     Years: 15.00     Types: Cigarettes     Start date: 1966     Quit date: 5/10/2020     Years since quittin.0   • Smokeless tobacco: Never Used   Vaping Use   • Vaping Use: Never used   Substance and Sexual Activity   • Alcohol use: Yes     Alcohol/week: 4.0 standard drinks     Types: 4 Shots  "of liquor per week     Comment: maybe 4 shots per month   • Drug use: No   • Sexual activity: Defer         Procedures      Objective:       Physical Exam    /84 (BP Location: Right arm, Patient Position: Sitting, Cuff Size: Large Adult)   Pulse 81   Ht 185.4 cm (73\")   Wt 132 kg (291 lb)   SpO2 98%   BMI 38.39 kg/m²   The patient is alert, oriented and in no distress.    Vital signs as noted above.    Head and neck revealed no carotid bruits or jugular venous distension.  No thyromegaly or lymphadenopathy is present.    Lungs clear.  No wheezing.  Breath sounds are normal bilaterally.    Heart normal first and second heart sounds.  No murmur..  No pericardial rub is present.  No gallop is present.    Abdomen soft and nontender.  No organomegaly is present.    Extremities revealed good peripheral pulses without any pedal edema.    Skin warm and dry.  Pacemaker site looks normal.    Musculoskeletal system is grossly normal.    CNS grossly normal.    Reviewed and updated.        "

## 2021-05-26 ENCOUNTER — READMISSION MANAGEMENT (OUTPATIENT)
Dept: CALL CENTER | Facility: HOSPITAL | Age: 76
End: 2021-05-26

## 2021-05-26 NOTE — OUTREACH NOTE
Medical Week 1 Survey      Responses   [facility] patient discharged from?  [facility]   Does the patient have one of the following disease processes/diagnoses(primary or secondary)?  Other   Week 1 attempt successful?  Yes   Call start time  1105   Call end time  1106   Discharge diagnosis  SBO,  ARF/CKD   Person spoke with today (if not patient) and relationship  Emghan-spouse   Meds reviewed with patient/caregiver?  Yes   Does the patient have all medications ordered at discharge?  N/A   Is the patient taking all medications as directed (includes completed medication regime)?  Yes   Does the patient have a primary care provider?   Yes   Does the patient have an appointment with their PCP within 7 days of discharge?  Yes   Comments  Has appt tomorrow with PCP and saw cardiologist yesterday   Psychosocial issues?  No   Did the patient receive a copy of their discharge instructions?  Yes   What is the patient's perception of their health status since discharge?  Improving   Is the patient/caregiver able to teach back the hierarchy of who to call/visit for symptoms/problems? PCP, Specialist, Home health nurse, Urgent Care, ED, 911  Yes   If the patient is a current smoker, are they able to teach back resources for cessation?  Not a smoker   Additional teach back comments  Call was brief and wife states he is doing a little better   Week 1 call completed?  Yes   Wrap up additional comments  Denies questions or needs at this time          Lali Sahu LPN

## 2021-05-30 LAB — QT INTERVAL: 481 MS

## 2021-06-04 ENCOUNTER — READMISSION MANAGEMENT (OUTPATIENT)
Dept: CALL CENTER | Facility: HOSPITAL | Age: 76
End: 2021-06-04

## 2021-06-04 NOTE — OUTREACH NOTE
Medical Week 2 Survey      Responses   [facility] patient discharged from?  [facility]   Does the patient have one of the following disease processes/diagnoses(primary or secondary)?  Other   Week 2 attempt successful?  No   Unsuccessful attempts  Attempt 1          Kayleigh Back LPN

## 2021-06-09 ENCOUNTER — READMISSION MANAGEMENT (OUTPATIENT)
Dept: CALL CENTER | Facility: HOSPITAL | Age: 76
End: 2021-06-09

## 2021-06-09 ENCOUNTER — HOME CARE VISIT (OUTPATIENT)
Dept: HOME HEALTH SERVICES | Facility: HOME HEALTHCARE | Age: 76
End: 2021-06-09

## 2021-06-09 NOTE — OUTREACH NOTE
Medical Week 2 Survey      Responses   [facility] patient discharged from?  [facility]   Does the patient have one of the following disease processes/diagnoses(primary or secondary)?  Other   Week 2 attempt successful?  Yes   Call start time  1406   Call end time  1408   Is patient permission given to speak with other caregiver?  Yes   List who call center can speak with  LONI SIDHU   Person spoke with today (if not patient) and relationship  LONI SIDHU- WIFE   Meds reviewed with patient/caregiver?  Yes   Does the patient have all medications ordered at discharge?  N/A   Is the patient taking all medications as directed (includes completed medication regime)?  Yes   Does the patient have a primary care provider?   Yes   Has the patient kept scheduled appointments due by today?  Yes   What is the Home health agency?   [facility]   Has home health visited the patient within 72 hours of discharge?  Yes   Psychosocial issues?  No   Did the patient receive a copy of their discharge instructions?  Yes   Nursing interventions  Reviewed instructions with patient   What is the patient's perception of their health status since discharge?  Improving   Is the patient/caregiver able to teach back signs and symptoms related to disease process for when to call PCP?  Yes   Is the patient/caregiver able to teach back signs and symptoms related to disease process for when to call 911?  Yes   Is the patient/caregiver able to teach back the hierarchy of who to call/visit for symptoms/problems? PCP, Specialist, Home health nurse, Urgent Care, ED, 911  Yes   If the patient is a current smoker, are they able to teach back resources for cessation?  Not a smoker   Week 2 Call Completed?  Yes          Kayleigh Back LPN

## 2021-06-18 ENCOUNTER — READMISSION MANAGEMENT (OUTPATIENT)
Dept: CALL CENTER | Facility: HOSPITAL | Age: 76
End: 2021-06-18

## 2021-06-18 NOTE — OUTREACH NOTE
Medical Week 3 Survey      Responses   [facility] patient discharged from?  [facility]   Does the patient have one of the following disease processes/diagnoses(primary or secondary)?  Other   Week 3 attempt successful?  Yes   Call start time  0939   Call end time  0941   Meds reviewed with patient/caregiver?  Yes   Is the patient taking all medications as directed (includes completed medication regime)?  Yes   Has the patient kept scheduled appointments due by today?  Yes   Comments  will be seeing surgeon  tomorrow   What is the patient's perception of their health status since discharge?  Improving   Week 3 Call Completed?  Yes   Wrap up additional comments   wound is healing , having no problems, wife feels he is improving          Radha Reynolds RN

## 2021-06-29 ENCOUNTER — OFFICE VISIT (OUTPATIENT)
Dept: CARDIOLOGY | Facility: CLINIC | Age: 76
End: 2021-06-29

## 2021-06-29 ENCOUNTER — READMISSION MANAGEMENT (OUTPATIENT)
Dept: CALL CENTER | Facility: HOSPITAL | Age: 76
End: 2021-06-29

## 2021-06-29 ENCOUNTER — LAB (OUTPATIENT)
Dept: LAB | Facility: HOSPITAL | Age: 76
End: 2021-06-29

## 2021-06-29 ENCOUNTER — TRANSCRIBE ORDERS (OUTPATIENT)
Dept: ADMINISTRATIVE | Facility: HOSPITAL | Age: 76
End: 2021-06-29

## 2021-06-29 VITALS
BODY MASS INDEX: 37.77 KG/M2 | HEIGHT: 73 IN | OXYGEN SATURATION: 96 % | HEART RATE: 83 BPM | DIASTOLIC BLOOD PRESSURE: 87 MMHG | WEIGHT: 285 LBS | SYSTOLIC BLOOD PRESSURE: 144 MMHG

## 2021-06-29 DIAGNOSIS — I10 ESSENTIAL HYPERTENSION: ICD-10-CM

## 2021-06-29 DIAGNOSIS — Z95.0 PRESENCE OF CARDIAC PACEMAKER: Primary | ICD-10-CM

## 2021-06-29 DIAGNOSIS — Z95.820 STATUS POST ANGIOPLASTY WITH STENT: ICD-10-CM

## 2021-06-29 DIAGNOSIS — I13.10 MALIGNANT HYPERTENSIVE HEART AND CHRONIC KIDNEY DISEASE STAGE III (HCC): ICD-10-CM

## 2021-06-29 DIAGNOSIS — N18.30 MALIGNANT HYPERTENSIVE HEART AND CHRONIC KIDNEY DISEASE STAGE III (HCC): ICD-10-CM

## 2021-06-29 DIAGNOSIS — I25.118 CORONARY ARTERY DISEASE OF NATIVE ARTERY OF NATIVE HEART WITH STABLE ANGINA PECTORIS (HCC): ICD-10-CM

## 2021-06-29 DIAGNOSIS — I13.10 MALIGNANT HYPERTENSIVE HEART AND CHRONIC KIDNEY DISEASE STAGE III (HCC): Primary | ICD-10-CM

## 2021-06-29 DIAGNOSIS — I48.21 PERMANENT ATRIAL FIBRILLATION (HCC): ICD-10-CM

## 2021-06-29 DIAGNOSIS — N18.30 MALIGNANT HYPERTENSIVE HEART AND CHRONIC KIDNEY DISEASE STAGE III (HCC): Primary | ICD-10-CM

## 2021-06-29 DIAGNOSIS — I44.2 AV BLOCK, COMPLETE (HCC): ICD-10-CM

## 2021-06-29 DIAGNOSIS — E78.5 DYSLIPIDEMIA: ICD-10-CM

## 2021-06-29 LAB
ANION GAP SERPL CALCULATED.3IONS-SCNC: 10.6 MMOL/L (ref 5–15)
BASOPHILS # BLD AUTO: 0.09 10*3/MM3 (ref 0–0.2)
BASOPHILS NFR BLD AUTO: 1.1 % (ref 0–1.5)
BUN SERPL-MCNC: 16 MG/DL (ref 8–23)
BUN/CREAT SERPL: 15 (ref 7–25)
CALCIUM SPEC-SCNC: 9.1 MG/DL (ref 8.6–10.5)
CHLORIDE SERPL-SCNC: 96 MMOL/L (ref 98–107)
CO2 SERPL-SCNC: 24.4 MMOL/L (ref 22–29)
CREAT SERPL-MCNC: 1.07 MG/DL (ref 0.76–1.27)
DEPRECATED RDW RBC AUTO: 45.5 FL (ref 37–54)
EOSINOPHIL # BLD AUTO: 0.4 10*3/MM3 (ref 0–0.4)
EOSINOPHIL NFR BLD AUTO: 4.8 % (ref 0.3–6.2)
ERYTHROCYTE [DISTWIDTH] IN BLOOD BY AUTOMATED COUNT: 14.3 % (ref 12.3–15.4)
GFR SERPL CREATININE-BSD FRML MDRD: 68 ML/MIN/1.73
GLUCOSE SERPL-MCNC: 420 MG/DL (ref 65–99)
HCT VFR BLD AUTO: 42.5 % (ref 37.5–51)
HGB BLD-MCNC: 13.8 G/DL (ref 13–17.7)
IMM GRANULOCYTES # BLD AUTO: 0.07 10*3/MM3 (ref 0–0.05)
IMM GRANULOCYTES NFR BLD AUTO: 0.8 % (ref 0–0.5)
LYMPHOCYTES # BLD AUTO: 1.08 10*3/MM3 (ref 0.7–3.1)
LYMPHOCYTES NFR BLD AUTO: 12.9 % (ref 19.6–45.3)
MCH RBC QN AUTO: 28.8 PG (ref 26.6–33)
MCHC RBC AUTO-ENTMCNC: 32.5 G/DL (ref 31.5–35.7)
MCV RBC AUTO: 88.5 FL (ref 79–97)
MONOCYTES # BLD AUTO: 0.56 10*3/MM3 (ref 0.1–0.9)
MONOCYTES NFR BLD AUTO: 6.7 % (ref 5–12)
NEUTROPHILS NFR BLD AUTO: 6.14 10*3/MM3 (ref 1.7–7)
NEUTROPHILS NFR BLD AUTO: 73.7 % (ref 42.7–76)
NRBC BLD AUTO-RTO: 0 /100 WBC (ref 0–0.2)
PLATELET # BLD AUTO: 404 10*3/MM3 (ref 140–450)
PMV BLD AUTO: 10.6 FL (ref 6–12)
POTASSIUM SERPL-SCNC: 4.5 MMOL/L (ref 3.5–5.2)
RBC # BLD AUTO: 4.8 10*6/MM3 (ref 4.14–5.8)
SODIUM SERPL-SCNC: 131 MMOL/L (ref 136–145)
WBC # BLD AUTO: 8.34 10*3/MM3 (ref 3.4–10.8)

## 2021-06-29 PROCEDURE — 99214 OFFICE O/P EST MOD 30 MIN: CPT | Performed by: INTERNAL MEDICINE

## 2021-06-29 PROCEDURE — 36415 COLL VENOUS BLD VENIPUNCTURE: CPT

## 2021-06-29 PROCEDURE — 85025 COMPLETE CBC W/AUTO DIFF WBC: CPT

## 2021-06-29 PROCEDURE — 80048 BASIC METABOLIC PNL TOTAL CA: CPT

## 2021-06-29 NOTE — PROGRESS NOTES
Encounter Date:06/29/2021  Last seen 5/25/2021      Patient ID: Ro Nathan is a 75 y.o. male.    Chief Complaint:  Status post stent  Status post pacemaker  Diabetes  Dyslipidemia  Hypertension  COPD  Atrial fibrillation  Anticoagulation management     History of Present Illness  Since I have last seen, the patient has been without any chest discomfort ,shortness of breath, palpitations, dizziness or syncope.  Denies having any headache ,abdominal pain ,nausea, vomiting , diarrhea constipation, loss of weight or loss of appetite.  Denies having any excessive bruising ,hematuria or blood in the stool.    Review of all systems negative except as indicated.    Reviewed ROS.     Assessment and Plan      ]]]]]]]]]]]]]]]]]]]]]  Impression  ========  -Status post stent placement   Status post stent to proximal LAD 12/5/2019.  Patient had stent to circumflex and RCA in the past.    Cardiac catheterization 12/5/2019 by Dr. Munson  Left ventriculography  The overall estimated left ventricular ejection fraction was 65%.  Native coronary arteriography: Left dominant circulation     1.  Left main coronary arteries a large-caliber vessel gives rise left anterior descending left circumflex flex arteries.  There is an ostial eccentric 40% lesion that appears angiographically to approach 50%. No significant coronary atherosclerotic disease present.  2.  Left anterior descending artery is a large-caliber vessel that gives rise to large caliber diagonal branches and courses along the anterior interventricular groove and wraps around the apex.  There is a proximal eccentric 50% lesion within an in-stent restenotic segment followed by an greater than 80% focal eccentric in-stent restenotic lesion followed by a 60% lesion after the stented segment and otherwise no coronary atherosclerotic disease of any significance present.  3.  The left circumflex arteries a large-caliber vessel gives rise to large caliber bifurcating obtuse  Initial Anesthesia Post-op Note    Patient: Nahomi Howard  Procedure(s) Performed:  SECTION  Anesthesia type: Spinal    Vitals Value Taken Time   Temp 36.4 °C (97.5 °F) 2020  2:12 PM   Pulse 61 2020  2:41 PM   Resp 16 2020  2:12 PM   SpO2 98 % 2020  2:41 PM   /64 2020  2:41 PM       Last 24 I/O:     Intake/Output Summary (Last 24 hours) at 2020 1447  Last data filed at 2020 1358  Gross per 24 hour   Intake 700 ml   Output 591 ml   Net 109 ml         Patient Location: Labor and Delivery  Post-op Vital Signs:stable  Level of Consciousness: participates in exam, alert, awake and oriented  Respiratory Status: spontaneous ventilation and unassisted  Cardiovascular blood pressure returned to baseline  Hydration: euvolemic  Pain Management: well controlled  Handoff: Handoff to receiving clinician was performed and questions were answered  Nausea: None  Airway Patency:patent  Post-op Assessment: no complications, patient tolerated procedure well with no complications, recovered from regional anesthetic, dentition within defined limits and No Corneal Abrasion     marginal branch.  There is an ostial proximal concentric 50% lesion that is calcified that immediately gives rise to a very high first obtuse marginal branch that is best classified as a ramus intermedius branch supplying large territory in the anterolateral wall that has an ostial proximal 70% lesion.  The calcified continuing circumflex and the posterior AV groove gives a second obtuse marginal branch that has mild diffuse disease, a third obtuse marginal branch and then a bifurcating system that has a bifurcation stenting within it before giving rise to the left PDA.  There is diffuse 50% in-stent restenosis within this bifurcation stented segment into the obtuse marginal branch which itself has diffuse 50 to 60% disease within the stented segment.  There is a concentric 80% lesion in the circumflex PDA.  No significant coronary atherosclerotic disease present  4.  The right coronary arteries a large-caliber nondominant vessel has a stented mid segment after which there several acute marginal branches there is 50% in-stent restenosis approaching 60% at some portions within this nondominant right proximal to mid proximal segment.     Conclusions:     1.  Normal left heart filling pressures with preserved LV systolic function  2.  Normal epicardial anatomy with severe two-vessel coronary artery disease involving what is likely the culprit for the patient's chest pain syndrome, namely the very severe proximal LAD lesions.  We will treat the remaining circumflex lesions with medical therapy and see if the patient's symptoms resolved; this includes the severe lesion within the proximal portion of the high obtuse marginal branch within the circumflex system as well.    -Status post permanent ventricular pacemaker implantation ([employer] 1/13/2015)  Battery status is 4.5 years.     -Dyslipidemia diabetes hypertension COPD CKD 3.  History of cirrhosis     -Chronic atrial fibrillation     -History of right foot ulceration  with bone involvement.     -Peripheral vascular disease     -Large abdominal ventral hernia.     -Status post appendectomy appendectomy cholecystectomy left elbow surgery hernia repair     -Allergic to morphine pantoprazole haloperidol  ===========  Plan  ========  Status post pacemaker.  Patient had recently interrogation of the pacemaker today revealed good pacing parameters with battery status of 4.5 years.   Patient is almost 99.7% paced in the ventricle.     Chronic atrial fibrillation.-Rate controlled    Anticoagulation was discussed.  Patient was started on Eliquis 5 mg twice a day.  Patient does not want to be on Coumadin.  Review of the records indicated that patient has been off anticoagulation due to GIB.  We will observe closely.  Patient has problems with bleeding consideration will be given for watchman procedure.     Status post stent.  Patient is not having any angina pectoris or congestive heart failure.     Hypertension-149/84.     Dyslipidemia-continue atorvastatin     Follow-up in the office in 6 weeks with EKG.  ]]]]]]]]]]]]]]]]]]                              Diagnosis Plan   1. Presence of cardiac pacemaker     2. AV block, complete (CMS/HCC)     3. Permanent atrial fibrillation (CMS/HCC)     4. Status post angioplasty with stent     5. Dyslipidemia     6. Coronary artery disease of native artery of native heart with stable angina pectoris (CMS/HCC)     LAB RESULTS (LAST 7 DAYS)    CBC  Results from last 7 days   Lab Units 06/29/21  0801   WBC 10*3/mm3 8.34   RBC 10*6/mm3 4.80   HEMOGLOBIN g/dL 13.8   HEMATOCRIT % 42.5   MCV fL 88.5   PLATELETS 10*3/mm3 404       BMP  Results from last 7 days   Lab Units 06/29/21  0801   SODIUM mmol/L 131*   POTASSIUM mmol/L 4.5   CHLORIDE mmol/L 96*   CO2 mmol/L 24.4   BUN mg/dL 16   CREATININE mg/dL 1.07   GLUCOSE mg/dL 420*       CMP   Results from last 7 days   Lab Units 06/29/21  0801   SODIUM mmol/L 131*   POTASSIUM mmol/L 4.5   CHLORIDE mmol/L 96*   CO2  mmol/L 24.4   BUN mg/dL 16   CREATININE mg/dL 1.07   GLUCOSE mg/dL 420*         BNP        TROPONIN        CoAg        Creatinine Clearance  Estimated Creatinine Clearance: 85.2 mL/min (by C-G formula based on SCr of 1.07 mg/dL).    ABG        Radiology  No radiology results for the last day                The following portions of the patient's history were reviewed and updated as appropriate: allergies, current medications, past family history, past medical history, past social history, past surgical history and problem list.    Review of Systems   Constitutional: Negative for malaise/fatigue.   Cardiovascular: Positive for chest pain (twinge in chest). Negative for leg swelling, palpitations and syncope.   Respiratory: Positive for shortness of breath.    Skin: Negative for rash.   Gastrointestinal: Negative for nausea and vomiting.   Neurological: Positive for weakness. Negative for dizziness, light-headedness and numbness.         Current Outpatient Medications:   •  aspirin 81 MG EC tablet, Take 81 mg by mouth Daily., Disp: , Rfl:   •  atorvastatin (LIPITOR) 40 MG tablet, Take 1 tablet by mouth Every Night., Disp: 30 tablet, Rfl: 1  •  budesonide (PULMICORT) 0.5 MG/2ML nebulizer solution, Take 0.5 mg by nebulization 2 (Two) Times a Day As Needed., Disp: , Rfl:   •  clopidogrel (PLAVIX) 75 MG tablet, Take 75 mg by mouth Daily., Disp: , Rfl:   •  Ergocalciferol (VITAMIN D2 PO), Take 1,250 mg by mouth 2 (Two) Times a Week., Disp: , Rfl:   •  furosemide (LASIX) 20 MG tablet, Take 20 mg by mouth Daily. Do not take dos, Disp: , Rfl:   •  ipratropium-albuterol (DUO-NEB) 0.5-2.5 mg/3 ml nebulizer, 3 mL 4 (Four) Times a Day As Needed., Disp: , Rfl:   •  losartan (COZAAR) 50 MG tablet, Take 50 mg by mouth Every Morning. Do not take 24 hours prior to surgery.  LD 1-4-2021 before 1245, Disp: , Rfl:   •  Lyrica 150 MG capsule, Take 1 capsule by mouth 2 (Two) Times a Day., Disp: 60 capsule, Rfl: 2  •  metoprolol tartrate  (LOPRESSOR) 25 MG tablet, Take 1 tablet by mouth Every 12 (Twelve) Hours. (Patient taking differently: Take 25 mg by mouth 2 (Two) Times a Day With Meals.), Disp: 60 tablet, Rfl: 1  •  mirtazapine (REMERON) 30 MG tablet, Take 30 mg by mouth Every Night., Disp: , Rfl:   •  nitroglycerin (NITROSTAT) 0.4 MG SL tablet, Place 0.4 mg under the tongue Every 5 (Five) Minutes As Needed for Chest Pain., Disp: , Rfl:   •  oxyCODONE-acetaminophen (PERCOCET)  MG per tablet, Take 1 tablet by mouth Every 4 (Four) Hours As Needed for Moderate Pain ., Disp: , Rfl:   •  Perforomist 20 MCG/2ML nebulizer solution, 20 mcg 2 (Two) Times a Day., Disp: , Rfl:   •  potassium chloride (K-DUR) 10 MEQ CR tablet, Take 10 mEq by mouth 3 (Three) Times a Day With Meals. Take dos, Disp: , Rfl:   •  rOPINIRole (REQUIP) 0.5 MG tablet, Take 1 tablet by mouth Every 12 (Twelve) Hours. Take 1 hour before bedtime. (Patient taking differently: Take 0.5 mg by mouth Daily. Take 1 hour before bedtime.), Disp: 14 tablet, Rfl: 0  •  traZODone (DESYREL) 100 MG tablet, Take 100 mg by mouth Every Night., Disp: , Rfl:   •  VENTOLIN  (90 Base) MCG/ACT inhaler, Inhale 2 puffs Every 6 (Six) Hours As Needed for Wheezing or Shortness of Air. Use or bring dos, Disp: , Rfl:     Allergies   Allergen Reactions   • Haloperidol Hives   • Morphine Itching   • Pantoprazole Other (See Comments)     Feels sick after receiving       Family History   Problem Relation Age of Onset   • Alzheimer's disease Mother    • GI problems Father    • Heart disease Father        Past Surgical History:   Procedure Laterality Date   • ABDOMINAL SURGERY     • APPENDECTOMY     • BONE CURETTAGE Right 1/22/2021    Procedure: Wound excision with fifth metatarsal head resection, right foot.;  Surgeon: Josef Egan DPM;  Location: [facility] MAIN OR;  Service: Podiatry;  Laterality: Right;   • BONE INCISION AND DRAINAGE Right 10/5/2020    Procedure: Incision and drainage with debridement  of all nonviable soft tissue and bone with bone biopsy, right foot.;  Surgeon: Josef Egan DPM;  Location: [facility] MAIN OR;  Service: Podiatry;  Laterality: Right;   • CARDIAC CATHETERIZATION N/A 12/5/2019    Procedure: Left Heart Cath;  Surgeon: Mirza Munson MD;  Location: [facility] CATH INVASIVE LOCATION;  Service: Cardiology   • CARDIAC CATHETERIZATION N/A 12/5/2019    Procedure: Stent MICHAEL coronary;  Surgeon: Mirza Munson MD;  Location: [facility] CATH INVASIVE LOCATION;  Service: Cardiology   • CHOLECYSTECTOMY     • ELBOW PROCEDURE      left   • FOOT SURGERY      right foot   • HERNIA REPAIR     • INCISION AND DRAINAGE OF WOUND Right 4/6/2021    Procedure: INCISION AND DRAINAGE OF RIGHT FOOT 5TH METATARSAL TO BONE AND PARTIAL 5TH METATARSAL RESECTION;  Surgeon: Josef Egan DPM;  Location: [facility] MAIN OR;  Service: Podiatry;  Laterality: Right;   • INCISION AND DRAINAGE OF WOUND Right 4/8/2021    Procedure: INCISION AND DRAINAGE BONE, DELAYED PRIMARY CLOSURE;  Surgeon: Josef Egan DPM;  Location: [facility] MAIN OR;  Service: Podiatry;  Laterality: Right;   • INTERVENTIONAL RADIOLOGY PROCEDURE N/A 12/5/2019    Procedure: Intravascular Ultrasound;  Surgeon: Mirza Munson MD;  Location: [facility] CATH INVASIVE LOCATION;  Service: Cardiology   • PACEMAKER IMPLANTATION     • SC RT/LT HEART CATHETERS N/A 12/5/2019    Procedure: Percutaneous Coronary Intervention;  Surgeon: Mirza Munson MD;  Location: [facility] CATH INVASIVE LOCATION;  Service: Cardiology   • WOUND DEBRIDEMENT Right 10/29/2020    Procedure: Preparation and debridement of foot wound with application of Integra wound graft, right foot.;  Surgeon: Josef Egan DPM;  Location: [facility] MAIN OR;  Service: Podiatry;  Laterality: Right;       Past Medical History:   Diagnosis Date   • Alcoholic cirrhosis of liver with ascites (CMS/HCC) 10/26/2020   • Benign hypertension with CKD (chronic kidney  "disease) stage III (CMS/HCC) 10/26/2020   • Bruises easily    • CHF (congestive heart failure) (CMS/HCC)    • Chronic bronchitis with COPD (chronic obstructive pulmonary disease) (CMS/HCC) 10/26/2020   • Chronic respiratory failure with hypoxia (CMS/HCC) 10/26/2020   • Congenital heart defect    • Difficulty attaining erection    • Hypertension    • Low back pain    • Pacemaker    • Peripheral vascular disease due to secondary diabetes (CMS/HCC) 10/26/2020   • Poor circulation    • Prostate cancer (CMS/HCC)     prostate   • Shortness of breath    • Sleep apnea     using CPAP    • Stage 3a chronic kidney disease (CMS/HCC) 10/26/2020   • Stented coronary artery 2019    MICHAEL to LAD   • Type 2 diabetes, controlled, with neuropathy (CMS/HCC) 10/26/2020    no meds   • Type 2 diabetes, controlled, with neuropathy (CMS/HCC) 10/26/2020       Family History   Problem Relation Age of Onset   • Alzheimer's disease Mother    • GI problems Father    • Heart disease Father        Social History     Socioeconomic History   • Marital status:      Spouse name: Not on file   • Number of children: Not on file   • Years of education: Not on file   • Highest education level: Not on file   Tobacco Use   • Smoking status: Former Smoker     Years: 15.00     Types: Cigarettes     Start date: 1966     Quit date: 5/10/2020     Years since quittin.1   • Smokeless tobacco: Never Used   Vaping Use   • Vaping Use: Never used   Substance and Sexual Activity   • Alcohol use: Yes     Alcohol/week: 4.0 standard drinks     Types: 4 Shots of liquor per week     Comment: maybe 4 shots per month   • Drug use: No   • Sexual activity: Defer         Procedures      Objective:       Physical Exam    /87   Pulse 83   Ht 185.4 cm (73\")   Wt 129 kg (285 lb)   SpO2 96%   BMI 37.60 kg/m²   The patient is alert, oriented and in no distress.    Vital signs as noted above.    Head and neck revealed no carotid bruits or jugular venous " distension.  No thyromegaly or lymphadenopathy is present.    Lungs clear.  No wheezing.  Breath sounds are normal bilaterally.    Heart normal first and second heart sounds.  No murmur..  No pericardial rub is present.  No gallop is present.    Abdomen soft and nontender.  No organomegaly is present. Ventral hernia.    Extremities revealed good peripheral pulses without any pedal edema.    Skin warm and dry.    Musculoskeletal system is grossly normal.    CNS grossly normal.    Reviewed and unchanged from last visit.

## 2021-06-29 NOTE — OUTREACH NOTE
Medical Week 4 Survey      Responses   [facility] patient discharged from?  [facility]   Does the patient have one of the following disease processes/diagnoses(primary or secondary)?  Other   Week 4 attempt successful?  Yes   Call start time  1316   Call end time  1317   Discharge diagnosis  SBO,  ARF/CKD   Person spoke with today (if not patient) and relationship  LONI NAHUM- WIFE   What is the patient's perception of their health status since discharge?  Improving   Additional teach back comments  Call was brief and states he is doing better.     Week 4 Call Completed?  Yes   Would the patient like one additional call?  No   Graduated  Yes   Is the patient interested in additional calls from an ambulatory ?  NOTE:  applies to high risk patients requiring additional follow-up.  No   Did the patient feel the follow up calls were helpful during their recovery period?  Yes   Was the number of calls appropriate?  Yes   Wrap up additional comments  Denies questions or needs at this time          Lali Sahu LPN

## 2021-07-01 DIAGNOSIS — M54.16 LUMBAR RADICULOPATHY: ICD-10-CM

## 2021-07-01 RX ORDER — PREGABALIN 150 MG/1
CAPSULE ORAL
Qty: 60 CAPSULE | Refills: 1 | Status: SHIPPED | OUTPATIENT
Start: 2021-07-01 | End: 2021-09-27

## 2021-07-16 ENCOUNTER — APPOINTMENT (OUTPATIENT)
Dept: GENERAL RADIOLOGY | Facility: HOSPITAL | Age: 76
End: 2021-07-16

## 2021-07-16 ENCOUNTER — HOSPITAL ENCOUNTER (INPATIENT)
Facility: HOSPITAL | Age: 76
LOS: 6 days | Discharge: HOME HEALTH | End: 2021-07-24
Attending: EMERGENCY MEDICINE | Admitting: FAMILY MEDICINE

## 2021-07-16 DIAGNOSIS — A41.9 SEPSIS, UNSPECIFIED: Primary | ICD-10-CM

## 2021-07-16 DIAGNOSIS — R41.0 DELIRIUM: ICD-10-CM

## 2021-07-16 LAB
ALBUMIN SERPL-MCNC: 3.5 G/DL (ref 3.5–5.2)
ALBUMIN/GLOB SERPL: 1 G/DL
ALP SERPL-CCNC: 194 U/L (ref 39–117)
ALT SERPL W P-5'-P-CCNC: 12 U/L (ref 1–41)
ANION GAP SERPL CALCULATED.3IONS-SCNC: 18 MMOL/L (ref 5–15)
AST SERPL-CCNC: 23 U/L (ref 1–40)
B PARAPERT DNA SPEC QL NAA+PROBE: NOT DETECTED
B PERT DNA SPEC QL NAA+PROBE: NOT DETECTED
BACTERIA UR QL AUTO: ABNORMAL /HPF
BASOPHILS # BLD AUTO: 0 10*3/MM3 (ref 0–0.2)
BASOPHILS NFR BLD AUTO: 0.2 % (ref 0–1.5)
BILIRUB SERPL-MCNC: 0.6 MG/DL (ref 0–1.2)
BILIRUB UR QL STRIP: NEGATIVE
BUN SERPL-MCNC: 19 MG/DL (ref 8–23)
BUN/CREAT SERPL: 14 (ref 7–25)
C PNEUM DNA NPH QL NAA+NON-PROBE: NOT DETECTED
CALCIUM SPEC-SCNC: 9.7 MG/DL (ref 8.6–10.5)
CHLORIDE SERPL-SCNC: 94 MMOL/L (ref 98–107)
CLARITY UR: CLEAR
CO2 SERPL-SCNC: 22 MMOL/L (ref 22–29)
COLOR UR: ABNORMAL
CREAT SERPL-MCNC: 1.36 MG/DL (ref 0.76–1.27)
D-LACTATE SERPL-SCNC: 4.1 MMOL/L (ref 0.5–2)
D-LACTATE SERPL-SCNC: 4.3 MMOL/L (ref 0.5–2)
DEPRECATED RDW RBC AUTO: 46.8 FL (ref 37–54)
EOSINOPHIL # BLD AUTO: 0.1 10*3/MM3 (ref 0–0.4)
EOSINOPHIL NFR BLD AUTO: 0.5 % (ref 0.3–6.2)
ERYTHROCYTE [DISTWIDTH] IN BLOOD BY AUTOMATED COUNT: 16.1 % (ref 12.3–15.4)
FLUAV SUBTYP SPEC NAA+PROBE: NOT DETECTED
FLUBV RNA ISLT QL NAA+PROBE: NOT DETECTED
GFR SERPL CREATININE-BSD FRML MDRD: 51 ML/MIN/1.73
GLOBULIN UR ELPH-MCNC: 3.6 GM/DL
GLUCOSE BLDC GLUCOMTR-MCNC: 319 MG/DL (ref 70–105)
GLUCOSE BLDC GLUCOMTR-MCNC: 381 MG/DL (ref 70–105)
GLUCOSE BLDC GLUCOMTR-MCNC: 401 MG/DL (ref 70–105)
GLUCOSE SERPL-MCNC: 284 MG/DL (ref 65–99)
GLUCOSE UR STRIP-MCNC: ABNORMAL MG/DL
HADV DNA SPEC NAA+PROBE: NOT DETECTED
HBA1C MFR BLD: 9.3 % (ref 3.5–5.6)
HCOV 229E RNA SPEC QL NAA+PROBE: NOT DETECTED
HCOV HKU1 RNA SPEC QL NAA+PROBE: NOT DETECTED
HCOV NL63 RNA SPEC QL NAA+PROBE: NOT DETECTED
HCOV OC43 RNA SPEC QL NAA+PROBE: NOT DETECTED
HCT VFR BLD AUTO: 38.4 % (ref 37.5–51)
HGB BLD-MCNC: 12.9 G/DL (ref 13–17.7)
HGB UR QL STRIP.AUTO: ABNORMAL
HMPV RNA NPH QL NAA+NON-PROBE: NOT DETECTED
HOLD SPECIMEN: NORMAL
HOLD SPECIMEN: NORMAL
HPIV1 RNA SPEC QL NAA+PROBE: NOT DETECTED
HPIV2 RNA SPEC QL NAA+PROBE: NOT DETECTED
HPIV3 RNA NPH QL NAA+PROBE: NOT DETECTED
HPIV4 P GENE NPH QL NAA+PROBE: NOT DETECTED
HYALINE CASTS UR QL AUTO: ABNORMAL /LPF
KETONES UR QL STRIP: ABNORMAL
LEUKOCYTE ESTERASE UR QL STRIP.AUTO: NEGATIVE
LYMPHOCYTES # BLD AUTO: 0.3 10*3/MM3 (ref 0.7–3.1)
LYMPHOCYTES NFR BLD AUTO: 1.6 % (ref 19.6–45.3)
M PNEUMO IGG SER IA-ACNC: NOT DETECTED
MCH RBC QN AUTO: 27.4 PG (ref 26.6–33)
MCHC RBC AUTO-ENTMCNC: 33.5 G/DL (ref 31.5–35.7)
MCV RBC AUTO: 81.6 FL (ref 79–97)
MONOCYTES # BLD AUTO: 1.2 10*3/MM3 (ref 0.1–0.9)
MONOCYTES NFR BLD AUTO: 6 % (ref 5–12)
MUCOUS THREADS URNS QL MICRO: ABNORMAL /HPF
NEUTROPHILS NFR BLD AUTO: 18.8 10*3/MM3 (ref 1.7–7)
NEUTROPHILS NFR BLD AUTO: 91.7 % (ref 42.7–76)
NITRITE UR QL STRIP: NEGATIVE
NRBC BLD AUTO-RTO: 0 /100 WBC (ref 0–0.2)
NT-PROBNP SERPL-MCNC: 1224 PG/ML (ref 0–900)
PH UR STRIP.AUTO: 5.5 [PH] (ref 5–8)
PLATELET # BLD AUTO: 552 10*3/MM3 (ref 140–450)
PMV BLD AUTO: 7.4 FL (ref 6–12)
POTASSIUM SERPL-SCNC: 4.7 MMOL/L (ref 3.5–5.2)
PROT SERPL-MCNC: 7.1 G/DL (ref 6–8.5)
PROT UR QL STRIP: ABNORMAL
RBC # BLD AUTO: 4.7 10*6/MM3 (ref 4.14–5.8)
RBC # UR: ABNORMAL /HPF
REF LAB TEST METHOD: ABNORMAL
RHINOVIRUS RNA SPEC NAA+PROBE: NOT DETECTED
RSV RNA NPH QL NAA+NON-PROBE: NOT DETECTED
SARS-COV-2 RNA NPH QL NAA+NON-PROBE: NOT DETECTED
SODIUM SERPL-SCNC: 134 MMOL/L (ref 136–145)
SP GR UR STRIP: 1.02 (ref 1–1.03)
SQUAMOUS #/AREA URNS HPF: ABNORMAL /HPF
TROPONIN T SERPL-MCNC: 0.03 NG/ML (ref 0–0.03)
UROBILINOGEN UR QL STRIP: ABNORMAL
WBC # BLD AUTO: 20.5 10*3/MM3 (ref 3.4–10.8)
WBC UR QL AUTO: ABNORMAL /HPF
WHOLE BLOOD HOLD SPECIMEN: NORMAL

## 2021-07-16 PROCEDURE — G0378 HOSPITAL OBSERVATION PER HR: HCPCS

## 2021-07-16 PROCEDURE — 25010000002 METHYLPREDNISOLONE PER 125 MG: Performed by: EMERGENCY MEDICINE

## 2021-07-16 PROCEDURE — 25010000002 VANCOMYCIN 10 G RECONSTITUTED SOLUTION: Performed by: INTERNAL MEDICINE

## 2021-07-16 PROCEDURE — 82962 GLUCOSE BLOOD TEST: CPT

## 2021-07-16 PROCEDURE — 87147 CULTURE TYPE IMMUNOLOGIC: CPT | Performed by: EMERGENCY MEDICINE

## 2021-07-16 PROCEDURE — 25010000002 CEFEPIME PER 500 MG: Performed by: INTERNAL MEDICINE

## 2021-07-16 PROCEDURE — 94799 UNLISTED PULMONARY SVC/PX: CPT

## 2021-07-16 PROCEDURE — 81001 URINALYSIS AUTO W/SCOPE: CPT | Performed by: EMERGENCY MEDICINE

## 2021-07-16 PROCEDURE — 83605 ASSAY OF LACTIC ACID: CPT

## 2021-07-16 PROCEDURE — 87040 BLOOD CULTURE FOR BACTERIA: CPT | Performed by: EMERGENCY MEDICINE

## 2021-07-16 PROCEDURE — 0202U NFCT DS 22 TRGT SARS-COV-2: CPT | Performed by: EMERGENCY MEDICINE

## 2021-07-16 PROCEDURE — 93005 ELECTROCARDIOGRAM TRACING: CPT | Performed by: EMERGENCY MEDICINE

## 2021-07-16 PROCEDURE — 80053 COMPREHEN METABOLIC PANEL: CPT | Performed by: EMERGENCY MEDICINE

## 2021-07-16 PROCEDURE — 93005 ELECTROCARDIOGRAM TRACING: CPT

## 2021-07-16 PROCEDURE — 63710000001 INSULIN LISPRO (HUMAN) PER 5 UNITS: Performed by: INTERNAL MEDICINE

## 2021-07-16 PROCEDURE — 94640 AIRWAY INHALATION TREATMENT: CPT

## 2021-07-16 PROCEDURE — 87186 SC STD MICRODIL/AGAR DIL: CPT | Performed by: EMERGENCY MEDICINE

## 2021-07-16 PROCEDURE — 83036 HEMOGLOBIN GLYCOSYLATED A1C: CPT | Performed by: INTERNAL MEDICINE

## 2021-07-16 PROCEDURE — 25010000002 VANCOMYCIN PER 500 MG: Performed by: INTERNAL MEDICINE

## 2021-07-16 PROCEDURE — 85025 COMPLETE CBC W/AUTO DIFF WBC: CPT | Performed by: EMERGENCY MEDICINE

## 2021-07-16 PROCEDURE — 84484 ASSAY OF TROPONIN QUANT: CPT | Performed by: EMERGENCY MEDICINE

## 2021-07-16 PROCEDURE — 87150 DNA/RNA AMPLIFIED PROBE: CPT | Performed by: EMERGENCY MEDICINE

## 2021-07-16 PROCEDURE — 25010000002 HYDROMORPHONE PER 4 MG: Performed by: EMERGENCY MEDICINE

## 2021-07-16 PROCEDURE — 25010000002 ONDANSETRON PER 1 MG: Performed by: EMERGENCY MEDICINE

## 2021-07-16 PROCEDURE — 83880 ASSAY OF NATRIURETIC PEPTIDE: CPT | Performed by: EMERGENCY MEDICINE

## 2021-07-16 PROCEDURE — 71045 X-RAY EXAM CHEST 1 VIEW: CPT

## 2021-07-16 PROCEDURE — 99284 EMERGENCY DEPT VISIT MOD MDM: CPT

## 2021-07-16 RX ORDER — METHYLPREDNISOLONE SODIUM SUCCINATE 125 MG/2ML
125 INJECTION, POWDER, LYOPHILIZED, FOR SOLUTION INTRAMUSCULAR; INTRAVENOUS ONCE
Status: COMPLETED | OUTPATIENT
Start: 2021-07-16 | End: 2021-07-16

## 2021-07-16 RX ORDER — SODIUM CHLORIDE 0.9 % (FLUSH) 0.9 %
3-10 SYRINGE (ML) INJECTION AS NEEDED
Status: DISCONTINUED | OUTPATIENT
Start: 2021-07-16 | End: 2021-07-24 | Stop reason: HOSPADM

## 2021-07-16 RX ORDER — ONDANSETRON 2 MG/ML
4 INJECTION INTRAMUSCULAR; INTRAVENOUS EVERY 6 HOURS PRN
Status: DISCONTINUED | OUTPATIENT
Start: 2021-07-16 | End: 2021-07-24 | Stop reason: HOSPADM

## 2021-07-16 RX ORDER — INSULIN LISPRO 100 [IU]/ML
0-9 INJECTION, SOLUTION INTRAVENOUS; SUBCUTANEOUS AS NEEDED
Status: DISCONTINUED | OUTPATIENT
Start: 2021-07-16 | End: 2021-07-24 | Stop reason: HOSPADM

## 2021-07-16 RX ORDER — TRAZODONE HYDROCHLORIDE 100 MG/1
100 TABLET ORAL NIGHTLY
Status: DISCONTINUED | OUTPATIENT
Start: 2021-07-16 | End: 2021-07-24 | Stop reason: HOSPADM

## 2021-07-16 RX ORDER — BUDESONIDE 0.5 MG/2ML
0.5 INHALANT ORAL
Status: DISCONTINUED | OUTPATIENT
Start: 2021-07-16 | End: 2021-07-24 | Stop reason: HOSPADM

## 2021-07-16 RX ORDER — ONDANSETRON 2 MG/ML
4 INJECTION INTRAMUSCULAR; INTRAVENOUS ONCE
Status: COMPLETED | OUTPATIENT
Start: 2021-07-16 | End: 2021-07-16

## 2021-07-16 RX ORDER — DEXTROSE MONOHYDRATE 25 G/50ML
25 INJECTION, SOLUTION INTRAVENOUS
Status: DISCONTINUED | OUTPATIENT
Start: 2021-07-16 | End: 2021-07-24 | Stop reason: HOSPADM

## 2021-07-16 RX ORDER — ALBUTEROL SULFATE 90 UG/1
2 AEROSOL, METERED RESPIRATORY (INHALATION)
Status: DISCONTINUED | OUTPATIENT
Start: 2021-07-16 | End: 2021-07-23

## 2021-07-16 RX ORDER — ACETAMINOPHEN 325 MG/1
650 TABLET ORAL EVERY 4 HOURS PRN
Status: DISCONTINUED | OUTPATIENT
Start: 2021-07-16 | End: 2021-07-24 | Stop reason: HOSPADM

## 2021-07-16 RX ORDER — ATORVASTATIN CALCIUM 40 MG/1
40 TABLET, FILM COATED ORAL EVERY MORNING
Status: DISCONTINUED | OUTPATIENT
Start: 2021-07-17 | End: 2021-07-24 | Stop reason: HOSPADM

## 2021-07-16 RX ORDER — ROPINIROLE 0.5 MG/1
0.5 TABLET, FILM COATED ORAL NIGHTLY
Status: ON HOLD | COMMUNITY
End: 2022-01-01

## 2021-07-16 RX ORDER — IPRATROPIUM BROMIDE AND ALBUTEROL SULFATE 2.5; .5 MG/3ML; MG/3ML
3 SOLUTION RESPIRATORY (INHALATION) ONCE
Status: COMPLETED | OUTPATIENT
Start: 2021-07-16 | End: 2021-07-16

## 2021-07-16 RX ORDER — PREGABALIN 75 MG/1
150 CAPSULE ORAL 2 TIMES DAILY
Status: DISCONTINUED | OUTPATIENT
Start: 2021-07-16 | End: 2021-07-24 | Stop reason: HOSPADM

## 2021-07-16 RX ORDER — INSULIN LISPRO 100 [IU]/ML
0-9 INJECTION, SOLUTION INTRAVENOUS; SUBCUTANEOUS
Status: DISCONTINUED | OUTPATIENT
Start: 2021-07-16 | End: 2021-07-24 | Stop reason: HOSPADM

## 2021-07-16 RX ORDER — NICOTINE POLACRILEX 4 MG
15 LOZENGE BUCCAL
Status: DISCONTINUED | OUTPATIENT
Start: 2021-07-16 | End: 2021-07-24 | Stop reason: HOSPADM

## 2021-07-16 RX ORDER — OXYCODONE HYDROCHLORIDE 5 MG/1
10 TABLET ORAL EVERY 4 HOURS PRN
Status: DISCONTINUED | OUTPATIENT
Start: 2021-07-16 | End: 2021-07-24 | Stop reason: HOSPADM

## 2021-07-16 RX ORDER — ASPIRIN 81 MG/1
81 TABLET ORAL DAILY
Status: DISCONTINUED | OUTPATIENT
Start: 2021-07-16 | End: 2021-07-24 | Stop reason: HOSPADM

## 2021-07-16 RX ORDER — ROPINIROLE 0.25 MG/1
0.5 TABLET, FILM COATED ORAL NIGHTLY
Status: DISCONTINUED | OUTPATIENT
Start: 2021-07-16 | End: 2021-07-24 | Stop reason: HOSPADM

## 2021-07-16 RX ORDER — HYDROMORPHONE HCL 110MG/55ML
0.5 PATIENT CONTROLLED ANALGESIA SYRINGE INTRAVENOUS ONCE
Status: COMPLETED | OUTPATIENT
Start: 2021-07-16 | End: 2021-07-16

## 2021-07-16 RX ORDER — MIRTAZAPINE 15 MG/1
30 TABLET, FILM COATED ORAL NIGHTLY
Status: DISCONTINUED | OUTPATIENT
Start: 2021-07-16 | End: 2021-07-24 | Stop reason: HOSPADM

## 2021-07-16 RX ORDER — SODIUM CHLORIDE 0.9 % (FLUSH) 0.9 %
3 SYRINGE (ML) INJECTION EVERY 12 HOURS SCHEDULED
Status: DISCONTINUED | OUTPATIENT
Start: 2021-07-16 | End: 2021-07-24 | Stop reason: HOSPADM

## 2021-07-16 RX ORDER — CLOPIDOGREL BISULFATE 75 MG/1
75 TABLET ORAL DAILY
Status: DISCONTINUED | OUTPATIENT
Start: 2021-07-17 | End: 2021-07-24 | Stop reason: HOSPADM

## 2021-07-16 RX ORDER — NITROGLYCERIN 0.4 MG/1
0.4 TABLET SUBLINGUAL
Status: DISCONTINUED | OUTPATIENT
Start: 2021-07-16 | End: 2021-07-24 | Stop reason: HOSPADM

## 2021-07-16 RX ORDER — NITROGLYCERIN 0.4 MG/1
0.4 TABLET SUBLINGUAL
Status: DISCONTINUED | OUTPATIENT
Start: 2021-07-16 | End: 2021-07-20 | Stop reason: SDUPTHER

## 2021-07-16 RX ORDER — SODIUM CHLORIDE 9 MG/ML
20 INJECTION, SOLUTION INTRAVENOUS CONTINUOUS
Status: DISCONTINUED | OUTPATIENT
Start: 2021-07-16 | End: 2021-07-24 | Stop reason: HOSPADM

## 2021-07-16 RX ORDER — FAMOTIDINE 20 MG/1
40 TABLET, FILM COATED ORAL DAILY
Status: DISCONTINUED | OUTPATIENT
Start: 2021-07-16 | End: 2021-07-17

## 2021-07-16 RX ORDER — IPRATROPIUM BROMIDE AND ALBUTEROL SULFATE 2.5; .5 MG/3ML; MG/3ML
3 SOLUTION RESPIRATORY (INHALATION) EVERY 4 HOURS PRN
Status: DISCONTINUED | OUTPATIENT
Start: 2021-07-16 | End: 2021-07-16

## 2021-07-16 RX ORDER — CHOLECALCIFEROL (VITAMIN D3) 125 MCG
5 CAPSULE ORAL NIGHTLY PRN
Status: DISCONTINUED | OUTPATIENT
Start: 2021-07-16 | End: 2021-07-24 | Stop reason: HOSPADM

## 2021-07-16 RX ORDER — ATORVASTATIN CALCIUM 40 MG/1
40 TABLET, FILM COATED ORAL EVERY MORNING
COMMUNITY

## 2021-07-16 RX ADMIN — BUDESONIDE 0.5 MG: 0.5 SUSPENSION RESPIRATORY (INHALATION) at 20:08

## 2021-07-16 RX ADMIN — MIRTAZAPINE 30 MG: 15 TABLET, FILM COATED ORAL at 20:28

## 2021-07-16 RX ADMIN — ROPINIROLE HYDROCHLORIDE 0.5 MG: 0.25 TABLET, FILM COATED ORAL at 20:27

## 2021-07-16 RX ADMIN — NITROGLYCERIN 1 INCH: 20 OINTMENT TOPICAL at 14:39

## 2021-07-16 RX ADMIN — ALBUTEROL SULFATE 2 PUFF: 108 AEROSOL, METERED RESPIRATORY (INHALATION) at 20:08

## 2021-07-16 RX ADMIN — OXYCODONE HYDROCHLORIDE 10 MG: 5 TABLET ORAL at 19:57

## 2021-07-16 RX ADMIN — INSULIN LISPRO 9 UNITS: 100 INJECTION, SOLUTION INTRAVENOUS; SUBCUTANEOUS at 18:07

## 2021-07-16 RX ADMIN — TRAZODONE HYDROCHLORIDE 100 MG: 100 TABLET ORAL at 20:27

## 2021-07-16 RX ADMIN — IPRATROPIUM BROMIDE AND ALBUTEROL SULFATE 3 ML: 2.5; .5 SOLUTION RESPIRATORY (INHALATION) at 14:15

## 2021-07-16 RX ADMIN — ASPIRIN 81 MG: 81 TABLET, COATED ORAL at 18:07

## 2021-07-16 RX ADMIN — HYDROMORPHONE HYDROCHLORIDE 0.5 MG: 2 INJECTION, SOLUTION INTRAMUSCULAR; INTRAVENOUS; SUBCUTANEOUS at 16:19

## 2021-07-16 RX ADMIN — SODIUM CHLORIDE 100 ML/HR: 9 INJECTION, SOLUTION INTRAVENOUS at 23:14

## 2021-07-16 RX ADMIN — PREGABALIN 150 MG: 75 CAPSULE ORAL at 20:27

## 2021-07-16 RX ADMIN — VANCOMYCIN HYDROCHLORIDE 1500 MG: 10 INJECTION, POWDER, LYOPHILIZED, FOR SOLUTION INTRAVENOUS at 19:57

## 2021-07-16 RX ADMIN — INSULIN LISPRO 8 UNITS: 100 INJECTION, SOLUTION INTRAVENOUS; SUBCUTANEOUS at 20:25

## 2021-07-16 RX ADMIN — METHYLPREDNISOLONE SODIUM SUCCINATE 125 MG: 125 INJECTION, POWDER, FOR SOLUTION INTRAMUSCULAR; INTRAVENOUS at 13:54

## 2021-07-16 RX ADMIN — CEFEPIME 2 G: 2 INJECTION, POWDER, FOR SOLUTION INTRAVENOUS at 18:28

## 2021-07-16 RX ADMIN — ONDANSETRON 4 MG: 2 INJECTION INTRAMUSCULAR; INTRAVENOUS at 16:19

## 2021-07-16 RX ADMIN — FAMOTIDINE 40 MG: 20 TABLET ORAL at 18:07

## 2021-07-17 ENCOUNTER — APPOINTMENT (OUTPATIENT)
Dept: GENERAL RADIOLOGY | Facility: HOSPITAL | Age: 76
End: 2021-07-17

## 2021-07-17 LAB
ANION GAP SERPL CALCULATED.3IONS-SCNC: 10 MMOL/L (ref 5–15)
BACTERIA BLD CULT: ABNORMAL
BASOPHILS # BLD AUTO: 0.1 10*3/MM3 (ref 0–0.2)
BASOPHILS NFR BLD AUTO: 0.4 % (ref 0–1.5)
BOTTLE TYPE: ABNORMAL
BUN SERPL-MCNC: 24 MG/DL (ref 8–23)
BUN/CREAT SERPL: 19.4 (ref 7–25)
CALCIUM SPEC-SCNC: 8.4 MG/DL (ref 8.6–10.5)
CHLORIDE SERPL-SCNC: 98 MMOL/L (ref 98–107)
CO2 SERPL-SCNC: 25 MMOL/L (ref 22–29)
CREAT SERPL-MCNC: 1.24 MG/DL (ref 0.76–1.27)
D-LACTATE SERPL-SCNC: 1.5 MMOL/L (ref 0.5–2)
DEPRECATED RDW RBC AUTO: 47.7 FL (ref 37–54)
EOSINOPHIL # BLD AUTO: 0 10*3/MM3 (ref 0–0.4)
EOSINOPHIL NFR BLD AUTO: 0.1 % (ref 0.3–6.2)
ERYTHROCYTE [DISTWIDTH] IN BLOOD BY AUTOMATED COUNT: 16.1 % (ref 12.3–15.4)
GFR SERPL CREATININE-BSD FRML MDRD: 57 ML/MIN/1.73
GLUCOSE BLDC GLUCOMTR-MCNC: 234 MG/DL (ref 70–105)
GLUCOSE BLDC GLUCOMTR-MCNC: 237 MG/DL (ref 70–105)
GLUCOSE BLDC GLUCOMTR-MCNC: 289 MG/DL (ref 70–105)
GLUCOSE BLDC GLUCOMTR-MCNC: 298 MG/DL (ref 70–105)
GLUCOSE BLDC GLUCOMTR-MCNC: 301 MG/DL (ref 70–105)
GLUCOSE SERPL-MCNC: 318 MG/DL (ref 65–99)
HCT VFR BLD AUTO: 33.6 % (ref 37.5–51)
HGB BLD-MCNC: 11.1 G/DL (ref 13–17.7)
LYMPHOCYTES # BLD AUTO: 0.8 10*3/MM3 (ref 0.7–3.1)
LYMPHOCYTES NFR BLD AUTO: 3.8 % (ref 19.6–45.3)
MCH RBC QN AUTO: 27.5 PG (ref 26.6–33)
MCHC RBC AUTO-ENTMCNC: 33.1 G/DL (ref 31.5–35.7)
MCV RBC AUTO: 83.1 FL (ref 79–97)
MONOCYTES # BLD AUTO: 1.1 10*3/MM3 (ref 0.1–0.9)
MONOCYTES NFR BLD AUTO: 5.4 % (ref 5–12)
MRSA DNA SPEC QL NAA+PROBE: NORMAL
NEUTROPHILS NFR BLD AUTO: 18.3 10*3/MM3 (ref 1.7–7)
NEUTROPHILS NFR BLD AUTO: 90.3 % (ref 42.7–76)
NRBC BLD AUTO-RTO: 0 /100 WBC (ref 0–0.2)
PLATELET # BLD AUTO: 472 10*3/MM3 (ref 140–450)
PMV BLD AUTO: 7.4 FL (ref 6–12)
POTASSIUM SERPL-SCNC: 5.3 MMOL/L (ref 3.5–5.2)
RBC # BLD AUTO: 4.04 10*6/MM3 (ref 4.14–5.8)
SODIUM SERPL-SCNC: 133 MMOL/L (ref 136–145)
VANCOMYCIN PEAK SERPL-MCNC: 20.6 MCG/ML (ref 20–40)
WBC # BLD AUTO: 20.2 10*3/MM3 (ref 3.4–10.8)

## 2021-07-17 PROCEDURE — 87641 MR-STAPH DNA AMP PROBE: CPT | Performed by: FAMILY MEDICINE

## 2021-07-17 PROCEDURE — 83605 ASSAY OF LACTIC ACID: CPT | Performed by: INTERNAL MEDICINE

## 2021-07-17 PROCEDURE — 63710000001 INSULIN LISPRO (HUMAN) PER 5 UNITS: Performed by: INTERNAL MEDICINE

## 2021-07-17 PROCEDURE — G0378 HOSPITAL OBSERVATION PER HR: HCPCS

## 2021-07-17 PROCEDURE — 94799 UNLISTED PULMONARY SVC/PX: CPT

## 2021-07-17 PROCEDURE — 25010000002 HYDROMORPHONE PER 4 MG: Performed by: FAMILY MEDICINE

## 2021-07-17 PROCEDURE — 85025 COMPLETE CBC W/AUTO DIFF WBC: CPT | Performed by: INTERNAL MEDICINE

## 2021-07-17 PROCEDURE — 25010000002 VANCOMYCIN 10 G RECONSTITUTED SOLUTION: Performed by: INTERNAL MEDICINE

## 2021-07-17 PROCEDURE — 80202 ASSAY OF VANCOMYCIN: CPT | Performed by: INTERNAL MEDICINE

## 2021-07-17 PROCEDURE — 25010000002 CEFEPIME PER 500 MG: Performed by: FAMILY MEDICINE

## 2021-07-17 PROCEDURE — 82962 GLUCOSE BLOOD TEST: CPT

## 2021-07-17 PROCEDURE — 80048 BASIC METABOLIC PNL TOTAL CA: CPT | Performed by: INTERNAL MEDICINE

## 2021-07-17 PROCEDURE — 63710000001 INSULIN GLARGINE PER 5 UNITS: Performed by: FAMILY MEDICINE

## 2021-07-17 PROCEDURE — 73630 X-RAY EXAM OF FOOT: CPT

## 2021-07-17 RX ORDER — HYDROMORPHONE HCL 110MG/55ML
1 PATIENT CONTROLLED ANALGESIA SYRINGE INTRAVENOUS EVERY 4 HOURS PRN
Status: DISCONTINUED | OUTPATIENT
Start: 2021-07-17 | End: 2021-07-24 | Stop reason: HOSPADM

## 2021-07-17 RX ORDER — INSULIN GLARGINE 100 [IU]/ML
10 INJECTION, SOLUTION SUBCUTANEOUS NIGHTLY
Status: DISCONTINUED | OUTPATIENT
Start: 2021-07-17 | End: 2021-07-24 | Stop reason: HOSPADM

## 2021-07-17 RX ADMIN — OXYCODONE HYDROCHLORIDE 10 MG: 5 TABLET ORAL at 08:07

## 2021-07-17 RX ADMIN — SODIUM CHLORIDE 100 ML/HR: 9 INJECTION, SOLUTION INTRAVENOUS at 08:01

## 2021-07-17 RX ADMIN — Medication 10 ML: at 20:32

## 2021-07-17 RX ADMIN — OXYCODONE HYDROCHLORIDE 10 MG: 5 TABLET ORAL at 03:51

## 2021-07-17 RX ADMIN — APIXABAN 5 MG: 5 TABLET, FILM COATED ORAL at 20:33

## 2021-07-17 RX ADMIN — ALBUTEROL SULFATE 2 PUFF: 108 AEROSOL, METERED RESPIRATORY (INHALATION) at 19:30

## 2021-07-17 RX ADMIN — APIXABAN 5 MG: 5 TABLET, FILM COATED ORAL at 08:01

## 2021-07-17 RX ADMIN — MIRTAZAPINE 30 MG: 15 TABLET, FILM COATED ORAL at 20:33

## 2021-07-17 RX ADMIN — HYDROMORPHONE HYDROCHLORIDE 1 MG: 2 INJECTION, SOLUTION INTRAMUSCULAR; INTRAVENOUS; SUBCUTANEOUS at 15:52

## 2021-07-17 RX ADMIN — ALBUTEROL SULFATE 2 PUFF: 108 AEROSOL, METERED RESPIRATORY (INHALATION) at 15:14

## 2021-07-17 RX ADMIN — FAMOTIDINE 40 MG: 20 TABLET ORAL at 08:01

## 2021-07-17 RX ADMIN — INSULIN GLARGINE 10 UNITS: 100 INJECTION, SOLUTION SUBCUTANEOUS at 20:40

## 2021-07-17 RX ADMIN — SODIUM CHLORIDE 100 ML/HR: 9 INJECTION, SOLUTION INTRAVENOUS at 17:15

## 2021-07-17 RX ADMIN — TRAZODONE HYDROCHLORIDE 100 MG: 100 TABLET ORAL at 20:33

## 2021-07-17 RX ADMIN — ASPIRIN 81 MG: 81 TABLET, COATED ORAL at 08:01

## 2021-07-17 RX ADMIN — SODIUM CHLORIDE, PRESERVATIVE FREE 3 ML: 5 INJECTION INTRAVENOUS at 20:29

## 2021-07-17 RX ADMIN — ALBUTEROL SULFATE 2 PUFF: 108 AEROSOL, METERED RESPIRATORY (INHALATION) at 07:31

## 2021-07-17 RX ADMIN — INSULIN LISPRO 4 UNITS: 100 INJECTION, SOLUTION INTRAVENOUS; SUBCUTANEOUS at 17:22

## 2021-07-17 RX ADMIN — PREGABALIN 150 MG: 75 CAPSULE ORAL at 08:01

## 2021-07-17 RX ADMIN — CEFEPIME 2 G: 2 INJECTION, POWDER, FOR SOLUTION INTRAVENOUS at 17:14

## 2021-07-17 RX ADMIN — OXYCODONE HYDROCHLORIDE 10 MG: 5 TABLET ORAL at 12:03

## 2021-07-17 RX ADMIN — CLOPIDOGREL BISULFATE 75 MG: 75 TABLET ORAL at 08:01

## 2021-07-17 RX ADMIN — ATORVASTATIN CALCIUM 40 MG: 40 TABLET, FILM COATED ORAL at 06:30

## 2021-07-17 RX ADMIN — HYDROMORPHONE HYDROCHLORIDE 1 MG: 2 INJECTION, SOLUTION INTRAMUSCULAR; INTRAVENOUS; SUBCUTANEOUS at 20:28

## 2021-07-17 RX ADMIN — BUDESONIDE 0.5 MG: 0.5 SUSPENSION RESPIRATORY (INHALATION) at 19:30

## 2021-07-17 RX ADMIN — INSULIN LISPRO 7 UNITS: 100 INJECTION, SOLUTION INTRAVENOUS; SUBCUTANEOUS at 07:57

## 2021-07-17 RX ADMIN — BUDESONIDE 0.5 MG: 0.5 SUSPENSION RESPIRATORY (INHALATION) at 07:31

## 2021-07-17 RX ADMIN — VANCOMYCIN HYDROCHLORIDE 1250 MG: 10 INJECTION, POWDER, LYOPHILIZED, FOR SOLUTION INTRAVENOUS at 14:38

## 2021-07-17 RX ADMIN — HYDROMORPHONE HYDROCHLORIDE 1 MG: 2 INJECTION, SOLUTION INTRAMUSCULAR; INTRAVENOUS; SUBCUTANEOUS at 10:46

## 2021-07-17 RX ADMIN — ROPINIROLE HYDROCHLORIDE 0.5 MG: 0.25 TABLET, FILM COATED ORAL at 20:33

## 2021-07-17 RX ADMIN — ALBUTEROL SULFATE 2 PUFF: 108 AEROSOL, METERED RESPIRATORY (INHALATION) at 11:05

## 2021-07-17 RX ADMIN — SODIUM CHLORIDE, PRESERVATIVE FREE 3 ML: 5 INJECTION INTRAVENOUS at 08:01

## 2021-07-17 RX ADMIN — INSULIN LISPRO 4 UNITS: 100 INJECTION, SOLUTION INTRAVENOUS; SUBCUTANEOUS at 12:03

## 2021-07-17 RX ADMIN — PREGABALIN 150 MG: 75 CAPSULE ORAL at 20:33

## 2021-07-18 LAB
ANION GAP SERPL CALCULATED.3IONS-SCNC: 7 MMOL/L (ref 5–15)
BASOPHILS # BLD AUTO: 0.1 10*3/MM3 (ref 0–0.2)
BASOPHILS NFR BLD AUTO: 0.8 % (ref 0–1.5)
BUN SERPL-MCNC: 20 MG/DL (ref 8–23)
BUN/CREAT SERPL: 18.2 (ref 7–25)
CALCIUM SPEC-SCNC: 8.4 MG/DL (ref 8.6–10.5)
CHLORIDE SERPL-SCNC: 101 MMOL/L (ref 98–107)
CO2 SERPL-SCNC: 26 MMOL/L (ref 22–29)
CREAT SERPL-MCNC: 1.1 MG/DL (ref 0.76–1.27)
DEPRECATED RDW RBC AUTO: 48.1 FL (ref 37–54)
EOSINOPHIL # BLD AUTO: 0.2 10*3/MM3 (ref 0–0.4)
EOSINOPHIL NFR BLD AUTO: 1.2 % (ref 0.3–6.2)
ERYTHROCYTE [DISTWIDTH] IN BLOOD BY AUTOMATED COUNT: 16.2 % (ref 12.3–15.4)
GFR SERPL CREATININE-BSD FRML MDRD: 65 ML/MIN/1.73
GLUCOSE BLDC GLUCOMTR-MCNC: 176 MG/DL (ref 70–105)
GLUCOSE BLDC GLUCOMTR-MCNC: 187 MG/DL (ref 70–105)
GLUCOSE BLDC GLUCOMTR-MCNC: 213 MG/DL (ref 70–105)
GLUCOSE BLDC GLUCOMTR-MCNC: 226 MG/DL (ref 70–105)
GLUCOSE SERPL-MCNC: 200 MG/DL (ref 65–99)
HCT VFR BLD AUTO: 34.1 % (ref 37.5–51)
HGB BLD-MCNC: 11.3 G/DL (ref 13–17.7)
LYMPHOCYTES # BLD AUTO: 0.8 10*3/MM3 (ref 0.7–3.1)
LYMPHOCYTES NFR BLD AUTO: 5.6 % (ref 19.6–45.3)
MCH RBC QN AUTO: 27.4 PG (ref 26.6–33)
MCHC RBC AUTO-ENTMCNC: 33.1 G/DL (ref 31.5–35.7)
MCV RBC AUTO: 82.9 FL (ref 79–97)
MONOCYTES # BLD AUTO: 0.8 10*3/MM3 (ref 0.1–0.9)
MONOCYTES NFR BLD AUTO: 5.3 % (ref 5–12)
NEUTROPHILS NFR BLD AUTO: 13.2 10*3/MM3 (ref 1.7–7)
NEUTROPHILS NFR BLD AUTO: 87.1 % (ref 42.7–76)
NRBC BLD AUTO-RTO: 0.1 /100 WBC (ref 0–0.2)
PLATELET # BLD AUTO: 426 10*3/MM3 (ref 140–450)
PMV BLD AUTO: 7 FL (ref 6–12)
POTASSIUM SERPL-SCNC: 5 MMOL/L (ref 3.5–5.2)
QT INTERVAL: 415 MS
RBC # BLD AUTO: 4.12 10*6/MM3 (ref 4.14–5.8)
SODIUM SERPL-SCNC: 134 MMOL/L (ref 136–145)
VANCOMYCIN TROUGH SERPL-MCNC: 9.7 MCG/ML (ref 5–20)
WBC # BLD AUTO: 15.1 10*3/MM3 (ref 3.4–10.8)

## 2021-07-18 PROCEDURE — 25010000002 VANCOMYCIN 10 G RECONSTITUTED SOLUTION: Performed by: INTERNAL MEDICINE

## 2021-07-18 PROCEDURE — 94799 UNLISTED PULMONARY SVC/PX: CPT

## 2021-07-18 PROCEDURE — 80048 BASIC METABOLIC PNL TOTAL CA: CPT | Performed by: FAMILY MEDICINE

## 2021-07-18 PROCEDURE — 82962 GLUCOSE BLOOD TEST: CPT

## 2021-07-18 PROCEDURE — 25010000002 ONDANSETRON PER 1 MG: Performed by: INTERNAL MEDICINE

## 2021-07-18 PROCEDURE — 25010000002 HYDROMORPHONE PER 4 MG: Performed by: FAMILY MEDICINE

## 2021-07-18 PROCEDURE — 25010000002 CEFEPIME PER 500 MG: Performed by: FAMILY MEDICINE

## 2021-07-18 PROCEDURE — 63710000001 INSULIN LISPRO (HUMAN) PER 5 UNITS: Performed by: INTERNAL MEDICINE

## 2021-07-18 PROCEDURE — 85025 COMPLETE CBC W/AUTO DIFF WBC: CPT | Performed by: FAMILY MEDICINE

## 2021-07-18 PROCEDURE — 63710000001 INSULIN GLARGINE PER 5 UNITS: Performed by: FAMILY MEDICINE

## 2021-07-18 PROCEDURE — 80202 ASSAY OF VANCOMYCIN: CPT | Performed by: INTERNAL MEDICINE

## 2021-07-18 PROCEDURE — 97166 OT EVAL MOD COMPLEX 45 MIN: CPT

## 2021-07-18 RX ADMIN — ALBUTEROL SULFATE 2 PUFF: 108 AEROSOL, METERED RESPIRATORY (INHALATION) at 18:56

## 2021-07-18 RX ADMIN — SODIUM CHLORIDE, PRESERVATIVE FREE 3 ML: 5 INJECTION INTRAVENOUS at 07:55

## 2021-07-18 RX ADMIN — VANCOMYCIN HYDROCHLORIDE 1500 MG: 10 INJECTION, POWDER, LYOPHILIZED, FOR SOLUTION INTRAVENOUS at 21:47

## 2021-07-18 RX ADMIN — SODIUM CHLORIDE, PRESERVATIVE FREE 3 ML: 5 INJECTION INTRAVENOUS at 21:54

## 2021-07-18 RX ADMIN — CEFEPIME 2 G: 2 INJECTION, POWDER, FOR SOLUTION INTRAVENOUS at 16:23

## 2021-07-18 RX ADMIN — HYDROMORPHONE HYDROCHLORIDE 1 MG: 2 INJECTION, SOLUTION INTRAMUSCULAR; INTRAVENOUS; SUBCUTANEOUS at 11:46

## 2021-07-18 RX ADMIN — PREGABALIN 150 MG: 75 CAPSULE ORAL at 07:54

## 2021-07-18 RX ADMIN — MIRTAZAPINE 30 MG: 15 TABLET, FILM COATED ORAL at 21:51

## 2021-07-18 RX ADMIN — PREGABALIN 150 MG: 75 CAPSULE ORAL at 21:51

## 2021-07-18 RX ADMIN — ROPINIROLE HYDROCHLORIDE 0.5 MG: 0.25 TABLET, FILM COATED ORAL at 21:50

## 2021-07-18 RX ADMIN — ONDANSETRON 4 MG: 2 INJECTION INTRAMUSCULAR; INTRAVENOUS at 17:45

## 2021-07-18 RX ADMIN — APIXABAN 5 MG: 5 TABLET, FILM COATED ORAL at 21:51

## 2021-07-18 RX ADMIN — Medication 10 ML: at 03:22

## 2021-07-18 RX ADMIN — ASPIRIN 81 MG: 81 TABLET, COATED ORAL at 07:53

## 2021-07-18 RX ADMIN — HYDROMORPHONE HYDROCHLORIDE 1 MG: 2 INJECTION, SOLUTION INTRAMUSCULAR; INTRAVENOUS; SUBCUTANEOUS at 16:23

## 2021-07-18 RX ADMIN — HYDROMORPHONE HYDROCHLORIDE 1 MG: 2 INJECTION, SOLUTION INTRAMUSCULAR; INTRAVENOUS; SUBCUTANEOUS at 20:38

## 2021-07-18 RX ADMIN — INSULIN LISPRO 2 UNITS: 100 INJECTION, SOLUTION INTRAVENOUS; SUBCUTANEOUS at 07:38

## 2021-07-18 RX ADMIN — ALBUTEROL SULFATE 2 PUFF: 108 AEROSOL, METERED RESPIRATORY (INHALATION) at 11:43

## 2021-07-18 RX ADMIN — BUDESONIDE 0.5 MG: 0.5 SUSPENSION RESPIRATORY (INHALATION) at 07:16

## 2021-07-18 RX ADMIN — INSULIN GLARGINE 10 UNITS: 100 INJECTION, SOLUTION SUBCUTANEOUS at 21:51

## 2021-07-18 RX ADMIN — APIXABAN 5 MG: 5 TABLET, FILM COATED ORAL at 07:54

## 2021-07-18 RX ADMIN — CEFEPIME 2 G: 2 INJECTION, POWDER, FOR SOLUTION INTRAVENOUS at 00:05

## 2021-07-18 RX ADMIN — CLOPIDOGREL BISULFATE 75 MG: 75 TABLET ORAL at 07:53

## 2021-07-18 RX ADMIN — INSULIN LISPRO 4 UNITS: 100 INJECTION, SOLUTION INTRAVENOUS; SUBCUTANEOUS at 17:39

## 2021-07-18 RX ADMIN — INSULIN LISPRO 4 UNITS: 100 INJECTION, SOLUTION INTRAVENOUS; SUBCUTANEOUS at 12:50

## 2021-07-18 RX ADMIN — SODIUM CHLORIDE 100 ML/HR: 9 INJECTION, SOLUTION INTRAVENOUS at 14:13

## 2021-07-18 RX ADMIN — Medication 10 ML: at 03:18

## 2021-07-18 RX ADMIN — VANCOMYCIN HYDROCHLORIDE 1250 MG: 10 INJECTION, POWDER, LYOPHILIZED, FOR SOLUTION INTRAVENOUS at 07:38

## 2021-07-18 RX ADMIN — HYDROMORPHONE HYDROCHLORIDE 1 MG: 2 INJECTION, SOLUTION INTRAMUSCULAR; INTRAVENOUS; SUBCUTANEOUS at 03:18

## 2021-07-18 RX ADMIN — CEFEPIME 2 G: 2 INJECTION, POWDER, FOR SOLUTION INTRAVENOUS at 09:56

## 2021-07-18 RX ADMIN — OXYCODONE HYDROCHLORIDE 10 MG: 5 TABLET ORAL at 19:08

## 2021-07-18 RX ADMIN — BUDESONIDE 0.5 MG: 0.5 SUSPENSION RESPIRATORY (INHALATION) at 18:56

## 2021-07-18 RX ADMIN — HYDROMORPHONE HYDROCHLORIDE 1 MG: 2 INJECTION, SOLUTION INTRAMUSCULAR; INTRAVENOUS; SUBCUTANEOUS at 07:37

## 2021-07-18 RX ADMIN — SODIUM CHLORIDE 100 ML/HR: 9 INJECTION, SOLUTION INTRAVENOUS at 03:40

## 2021-07-18 RX ADMIN — OXYCODONE HYDROCHLORIDE 10 MG: 5 TABLET ORAL at 10:10

## 2021-07-18 RX ADMIN — ALBUTEROL SULFATE 2 PUFF: 108 AEROSOL, METERED RESPIRATORY (INHALATION) at 15:24

## 2021-07-18 RX ADMIN — TRAZODONE HYDROCHLORIDE 100 MG: 100 TABLET ORAL at 21:51

## 2021-07-18 RX ADMIN — ATORVASTATIN CALCIUM 40 MG: 40 TABLET, FILM COATED ORAL at 06:32

## 2021-07-18 RX ADMIN — ALBUTEROL SULFATE 2 PUFF: 108 AEROSOL, METERED RESPIRATORY (INHALATION) at 07:16

## 2021-07-19 ENCOUNTER — APPOINTMENT (OUTPATIENT)
Dept: CARDIOLOGY | Facility: HOSPITAL | Age: 76
End: 2021-07-19

## 2021-07-19 LAB
ANION GAP SERPL CALCULATED.3IONS-SCNC: 9 MMOL/L (ref 5–15)
BH CV ECHO MEAS - ACS: 2.1 CM
BH CV ECHO MEAS - AO MAX PG (FULL): 2.9 MMHG
BH CV ECHO MEAS - AO MAX PG: 6.9 MMHG
BH CV ECHO MEAS - AO MEAN PG (FULL): 1.7 MMHG
BH CV ECHO MEAS - AO MEAN PG: 3.6 MMHG
BH CV ECHO MEAS - AO ROOT AREA (BSA CORRECTED): 1.6
BH CV ECHO MEAS - AO ROOT AREA: 12.4 CM^2
BH CV ECHO MEAS - AO ROOT DIAM: 4 CM
BH CV ECHO MEAS - AO V2 MAX: 131.1 CM/SEC
BH CV ECHO MEAS - AO V2 MEAN: 88 CM/SEC
BH CV ECHO MEAS - AO V2 VTI: 24.4 CM
BH CV ECHO MEAS - ASC AORTA: 4 CM
BH CV ECHO MEAS - AVA(I,A): 2.9 CM^2
BH CV ECHO MEAS - AVA(I,D): 2.9 CM^2
BH CV ECHO MEAS - AVA(V,A): 2.8 CM^2
BH CV ECHO MEAS - AVA(V,D): 2.8 CM^2
BH CV ECHO MEAS - BSA(HAYCOCK): 2.6 M^2
BH CV ECHO MEAS - BSA: 2.5 M^2
BH CV ECHO MEAS - BZI_BMI: 38.9 KILOGRAMS/M^2
BH CV ECHO MEAS - BZI_METRIC_HEIGHT: 182.9 CM
BH CV ECHO MEAS - BZI_METRIC_WEIGHT: 130.2 KG
BH CV ECHO MEAS - EDV(CUBED): 225.6 ML
BH CV ECHO MEAS - EDV(MOD-SP4): 156.8 ML
BH CV ECHO MEAS - EDV(TEICH): 186.1 ML
BH CV ECHO MEAS - EF(CUBED): 75.4 %
BH CV ECHO MEAS - EF(MOD-BP): 58 %
BH CV ECHO MEAS - EF(MOD-SP4): 58.2 %
BH CV ECHO MEAS - EF(TEICH): 66.4 %
BH CV ECHO MEAS - ESV(CUBED): 55.6 ML
BH CV ECHO MEAS - ESV(MOD-SP4): 65.5 ML
BH CV ECHO MEAS - ESV(TEICH): 62.6 ML
BH CV ECHO MEAS - FS: 37.3 %
BH CV ECHO MEAS - IVS/LVPW: 0.97
BH CV ECHO MEAS - IVSD: 1.7 CM
BH CV ECHO MEAS - LA DIMENSION(2D): 5.5 CM
BH CV ECHO MEAS - LV DIASTOLIC VOL/BSA (35-75): 63.1 ML/M^2
BH CV ECHO MEAS - LV MASS(C)D: 548.3 GRAMS
BH CV ECHO MEAS - LV MASS(C)DI: 220.8 GRAMS/M^2
BH CV ECHO MEAS - LV MAX PG: 4 MMHG
BH CV ECHO MEAS - LV MEAN PG: 2 MMHG
BH CV ECHO MEAS - LV SYSTOLIC VOL/BSA (12-30): 26.4 ML/M^2
BH CV ECHO MEAS - LV V1 MAX: 100.2 CM/SEC
BH CV ECHO MEAS - LV V1 MEAN: 62.7 CM/SEC
BH CV ECHO MEAS - LV V1 VTI: 19.4 CM
BH CV ECHO MEAS - LVIDD: 6.1 CM
BH CV ECHO MEAS - LVIDS: 3.8 CM
BH CV ECHO MEAS - LVOT AREA: 3.6 CM^2
BH CV ECHO MEAS - LVOT DIAM: 2.1 CM
BH CV ECHO MEAS - LVPWD: 1.8 CM
BH CV ECHO MEAS - MR MAX PG: 119.4 MMHG
BH CV ECHO MEAS - MR MAX VEL: 546.3 CM/SEC
BH CV ECHO MEAS - MV MAX PG: 11.7 MMHG
BH CV ECHO MEAS - MV MEAN PG: 3.1 MMHG
BH CV ECHO MEAS - MV V2 MAX: 171.1 CM/SEC
BH CV ECHO MEAS - MV V2 MEAN: 73.5 CM/SEC
BH CV ECHO MEAS - MV V2 VTI: 36.3 CM
BH CV ECHO MEAS - MVA(VTI): 1.9 CM^2
BH CV ECHO MEAS - PA ACC TIME: 0.08 SEC
BH CV ECHO MEAS - PA MAX PG (FULL): 2.6 MMHG
BH CV ECHO MEAS - PA MAX PG: 5 MMHG
BH CV ECHO MEAS - PA MEAN PG (FULL): 1.6 MMHG
BH CV ECHO MEAS - PA MEAN PG: 2.6 MMHG
BH CV ECHO MEAS - PA PR(ACCEL): 41.6 MMHG
BH CV ECHO MEAS - PA V2 MAX: 112 CM/SEC
BH CV ECHO MEAS - PA V2 MEAN: 73.6 CM/SEC
BH CV ECHO MEAS - PA V2 VTI: 21.4 CM
BH CV ECHO MEAS - PULM DIAS VEL: 62.6 CM/SEC
BH CV ECHO MEAS - PULM S/D: 0.62
BH CV ECHO MEAS - PULM SYS VEL: 39.1 CM/SEC
BH CV ECHO MEAS - PVA(I,A): 3.6 CM^2
BH CV ECHO MEAS - PVA(I,D): 3.6 CM^2
BH CV ECHO MEAS - PVA(V,A): 3.6 CM^2
BH CV ECHO MEAS - PVA(V,D): 3.6 CM^2
BH CV ECHO MEAS - QP/QS: 1.1
BH CV ECHO MEAS - RAP SYSTOLE: 8 MMHG
BH CV ECHO MEAS - RV MAX PG: 2.4 MMHG
BH CV ECHO MEAS - RV MEAN PG: 0.96 MMHG
BH CV ECHO MEAS - RV V1 MAX: 78.1 CM/SEC
BH CV ECHO MEAS - RV V1 MEAN: 44.2 CM/SEC
BH CV ECHO MEAS - RV V1 VTI: 14.9 CM
BH CV ECHO MEAS - RVDD: 2.5 CM
BH CV ECHO MEAS - RVOT AREA: 5.1 CM^2
BH CV ECHO MEAS - RVOT DIAM: 2.6 CM
BH CV ECHO MEAS - RVSP: 70 MMHG
BH CV ECHO MEAS - SI(AO): 121.4 ML/M^2
BH CV ECHO MEAS - SI(CUBED): 68.5 ML/M^2
BH CV ECHO MEAS - SI(LVOT): 28.3 ML/M^2
BH CV ECHO MEAS - SI(MOD-SP4): 36.7 ML/M^2
BH CV ECHO MEAS - SI(TEICH): 49.7 ML/M^2
BH CV ECHO MEAS - SV(AO): 301.6 ML
BH CV ECHO MEAS - SV(CUBED): 170.1 ML
BH CV ECHO MEAS - SV(LVOT): 70.3 ML
BH CV ECHO MEAS - SV(MOD-SP4): 91.3 ML
BH CV ECHO MEAS - SV(RVOT): 76.3 ML
BH CV ECHO MEAS - SV(TEICH): 123.5 ML
BH CV ECHO MEAS - TR MAX VEL: 393.7 CM/SEC
BUN SERPL-MCNC: 13 MG/DL (ref 8–23)
BUN/CREAT SERPL: 15.1 (ref 7–25)
CALCIUM SPEC-SCNC: 8.3 MG/DL (ref 8.6–10.5)
CHLORIDE SERPL-SCNC: 99 MMOL/L (ref 98–107)
CO2 SERPL-SCNC: 22 MMOL/L (ref 22–29)
CREAT SERPL-MCNC: 0.86 MG/DL (ref 0.76–1.27)
GFR SERPL CREATININE-BSD FRML MDRD: 87 ML/MIN/1.73
GLUCOSE BLDC GLUCOMTR-MCNC: 153 MG/DL (ref 70–105)
GLUCOSE BLDC GLUCOMTR-MCNC: 180 MG/DL (ref 70–105)
GLUCOSE BLDC GLUCOMTR-MCNC: 195 MG/DL (ref 70–105)
GLUCOSE BLDC GLUCOMTR-MCNC: 218 MG/DL (ref 70–105)
GLUCOSE SERPL-MCNC: 190 MG/DL (ref 65–99)
LV EF 2D ECHO EST: 60 %
POTASSIUM SERPL-SCNC: 4.5 MMOL/L (ref 3.5–5.2)
SODIUM SERPL-SCNC: 130 MMOL/L (ref 136–145)
VANCOMYCIN PEAK SERPL-MCNC: 17.8 MCG/ML (ref 20–40)

## 2021-07-19 PROCEDURE — 93306 TTE W/DOPPLER COMPLETE: CPT | Performed by: INTERNAL MEDICINE

## 2021-07-19 PROCEDURE — 82962 GLUCOSE BLOOD TEST: CPT

## 2021-07-19 PROCEDURE — 94799 UNLISTED PULMONARY SVC/PX: CPT

## 2021-07-19 PROCEDURE — 25010000002 ONDANSETRON PER 1 MG: Performed by: INTERNAL MEDICINE

## 2021-07-19 PROCEDURE — 63710000001 INSULIN LISPRO (HUMAN) PER 5 UNITS: Performed by: INTERNAL MEDICINE

## 2021-07-19 PROCEDURE — 80048 BASIC METABOLIC PNL TOTAL CA: CPT | Performed by: FAMILY MEDICINE

## 2021-07-19 PROCEDURE — 25010000002 HYDROMORPHONE PER 4 MG: Performed by: FAMILY MEDICINE

## 2021-07-19 PROCEDURE — 63710000001 INSULIN GLARGINE PER 5 UNITS: Performed by: FAMILY MEDICINE

## 2021-07-19 PROCEDURE — 25010000002 CEFEPIME PER 500 MG: Performed by: FAMILY MEDICINE

## 2021-07-19 PROCEDURE — 93306 TTE W/DOPPLER COMPLETE: CPT

## 2021-07-19 PROCEDURE — 25010000002 VANCOMYCIN PER 500 MG: Performed by: INTERNAL MEDICINE

## 2021-07-19 PROCEDURE — 99223 1ST HOSP IP/OBS HIGH 75: CPT | Performed by: INTERNAL MEDICINE

## 2021-07-19 PROCEDURE — 80202 ASSAY OF VANCOMYCIN: CPT | Performed by: INTERNAL MEDICINE

## 2021-07-19 PROCEDURE — 25010000002 VANCOMYCIN 10 G RECONSTITUTED SOLUTION: Performed by: INTERNAL MEDICINE

## 2021-07-19 RX ADMIN — PREGABALIN 150 MG: 75 CAPSULE ORAL at 08:05

## 2021-07-19 RX ADMIN — CEFEPIME 2 G: 2 INJECTION, POWDER, FOR SOLUTION INTRAVENOUS at 16:49

## 2021-07-19 RX ADMIN — CEFEPIME 2 G: 2 INJECTION, POWDER, FOR SOLUTION INTRAVENOUS at 08:06

## 2021-07-19 RX ADMIN — MICONAZOLE NITRATE 1 APPLICATION: 20 CREAM TOPICAL at 21:26

## 2021-07-19 RX ADMIN — ONDANSETRON 4 MG: 2 INJECTION INTRAMUSCULAR; INTRAVENOUS at 19:42

## 2021-07-19 RX ADMIN — SODIUM CHLORIDE 100 ML/HR: 9 INJECTION, SOLUTION INTRAVENOUS at 16:20

## 2021-07-19 RX ADMIN — INSULIN LISPRO 4 UNITS: 100 INJECTION, SOLUTION INTRAVENOUS; SUBCUTANEOUS at 12:12

## 2021-07-19 RX ADMIN — OXYCODONE HYDROCHLORIDE 10 MG: 5 TABLET ORAL at 12:16

## 2021-07-19 RX ADMIN — CEFEPIME 2 G: 2 INJECTION, POWDER, FOR SOLUTION INTRAVENOUS at 08:05

## 2021-07-19 RX ADMIN — BUDESONIDE 0.5 MG: 0.5 SUSPENSION RESPIRATORY (INHALATION) at 07:34

## 2021-07-19 RX ADMIN — TRAZODONE HYDROCHLORIDE 100 MG: 100 TABLET ORAL at 21:24

## 2021-07-19 RX ADMIN — ALBUTEROL SULFATE 2 PUFF: 108 AEROSOL, METERED RESPIRATORY (INHALATION) at 07:34

## 2021-07-19 RX ADMIN — MIRTAZAPINE 30 MG: 15 TABLET, FILM COATED ORAL at 21:25

## 2021-07-19 RX ADMIN — ASPIRIN 81 MG: 81 TABLET, COATED ORAL at 08:05

## 2021-07-19 RX ADMIN — HYDROMORPHONE HYDROCHLORIDE 1 MG: 2 INJECTION, SOLUTION INTRAMUSCULAR; INTRAVENOUS; SUBCUTANEOUS at 06:19

## 2021-07-19 RX ADMIN — PREGABALIN 150 MG: 75 CAPSULE ORAL at 21:24

## 2021-07-19 RX ADMIN — HYDROMORPHONE HYDROCHLORIDE 1 MG: 2 INJECTION, SOLUTION INTRAMUSCULAR; INTRAVENOUS; SUBCUTANEOUS at 14:07

## 2021-07-19 RX ADMIN — HYDROMORPHONE HYDROCHLORIDE 1 MG: 2 INJECTION, SOLUTION INTRAMUSCULAR; INTRAVENOUS; SUBCUTANEOUS at 10:00

## 2021-07-19 RX ADMIN — INSULIN LISPRO 2 UNITS: 100 INJECTION, SOLUTION INTRAVENOUS; SUBCUTANEOUS at 16:49

## 2021-07-19 RX ADMIN — HYDROMORPHONE HYDROCHLORIDE 1 MG: 2 INJECTION, SOLUTION INTRAMUSCULAR; INTRAVENOUS; SUBCUTANEOUS at 23:01

## 2021-07-19 RX ADMIN — ALBUTEROL SULFATE 2 PUFF: 108 AEROSOL, METERED RESPIRATORY (INHALATION) at 18:09

## 2021-07-19 RX ADMIN — ATORVASTATIN CALCIUM 40 MG: 40 TABLET, FILM COATED ORAL at 06:20

## 2021-07-19 RX ADMIN — APIXABAN 5 MG: 5 TABLET, FILM COATED ORAL at 21:25

## 2021-07-19 RX ADMIN — VANCOMYCIN HYDROCHLORIDE 1500 MG: 10 INJECTION, POWDER, LYOPHILIZED, FOR SOLUTION INTRAVENOUS at 16:50

## 2021-07-19 RX ADMIN — HYDROMORPHONE HYDROCHLORIDE 1 MG: 2 INJECTION, SOLUTION INTRAMUSCULAR; INTRAVENOUS; SUBCUTANEOUS at 01:02

## 2021-07-19 RX ADMIN — APIXABAN 5 MG: 5 TABLET, FILM COATED ORAL at 08:05

## 2021-07-19 RX ADMIN — SODIUM CHLORIDE, PRESERVATIVE FREE 3 ML: 5 INJECTION INTRAVENOUS at 08:06

## 2021-07-19 RX ADMIN — INSULIN LISPRO 2 UNITS: 100 INJECTION, SOLUTION INTRAVENOUS; SUBCUTANEOUS at 08:06

## 2021-07-19 RX ADMIN — CLOPIDOGREL BISULFATE 75 MG: 75 TABLET ORAL at 08:05

## 2021-07-19 RX ADMIN — INSULIN GLARGINE 10 UNITS: 100 INJECTION, SOLUTION SUBCUTANEOUS at 21:24

## 2021-07-19 RX ADMIN — OXYCODONE HYDROCHLORIDE 10 MG: 5 TABLET ORAL at 16:49

## 2021-07-19 RX ADMIN — ALBUTEROL SULFATE 2 PUFF: 108 AEROSOL, METERED RESPIRATORY (INHALATION) at 11:15

## 2021-07-19 RX ADMIN — BUDESONIDE 0.5 MG: 0.5 SUSPENSION RESPIRATORY (INHALATION) at 18:11

## 2021-07-19 RX ADMIN — ROPINIROLE HYDROCHLORIDE 0.5 MG: 0.25 TABLET, FILM COATED ORAL at 21:24

## 2021-07-19 RX ADMIN — ALBUTEROL SULFATE 2 PUFF: 108 AEROSOL, METERED RESPIRATORY (INHALATION) at 15:22

## 2021-07-19 RX ADMIN — SODIUM CHLORIDE, PRESERVATIVE FREE 3 ML: 5 INJECTION INTRAVENOUS at 21:27

## 2021-07-19 RX ADMIN — HYDROMORPHONE HYDROCHLORIDE 1 MG: 2 INJECTION, SOLUTION INTRAMUSCULAR; INTRAVENOUS; SUBCUTANEOUS at 18:19

## 2021-07-20 LAB
BASOPHILS # BLD AUTO: 0.1 10*3/MM3 (ref 0–0.2)
BASOPHILS NFR BLD AUTO: 0.7 % (ref 0–1.5)
DEPRECATED RDW RBC AUTO: 47.7 FL (ref 37–54)
EOSINOPHIL # BLD AUTO: 0.1 10*3/MM3 (ref 0–0.4)
EOSINOPHIL NFR BLD AUTO: 1.2 % (ref 0.3–6.2)
ERYTHROCYTE [DISTWIDTH] IN BLOOD BY AUTOMATED COUNT: 16.3 % (ref 12.3–15.4)
GLUCOSE BLDC GLUCOMTR-MCNC: 159 MG/DL (ref 70–105)
GLUCOSE BLDC GLUCOMTR-MCNC: 170 MG/DL (ref 70–105)
GLUCOSE BLDC GLUCOMTR-MCNC: 206 MG/DL (ref 70–105)
GLUCOSE BLDC GLUCOMTR-MCNC: 263 MG/DL (ref 70–105)
HCT VFR BLD AUTO: 32.5 % (ref 37.5–51)
HGB BLD-MCNC: 10.6 G/DL (ref 13–17.7)
LYMPHOCYTES # BLD AUTO: 0.7 10*3/MM3 (ref 0.7–3.1)
LYMPHOCYTES NFR BLD AUTO: 6.4 % (ref 19.6–45.3)
MCH RBC QN AUTO: 27.3 PG (ref 26.6–33)
MCHC RBC AUTO-ENTMCNC: 32.8 G/DL (ref 31.5–35.7)
MCV RBC AUTO: 83.5 FL (ref 79–97)
MONOCYTES # BLD AUTO: 0.9 10*3/MM3 (ref 0.1–0.9)
MONOCYTES NFR BLD AUTO: 8.4 % (ref 5–12)
NEUTROPHILS NFR BLD AUTO: 83.3 % (ref 42.7–76)
NEUTROPHILS NFR BLD AUTO: 9.4 10*3/MM3 (ref 1.7–7)
NRBC BLD AUTO-RTO: 0.1 /100 WBC (ref 0–0.2)
PLATELET # BLD AUTO: 408 10*3/MM3 (ref 140–450)
PMV BLD AUTO: 7.5 FL (ref 6–12)
RBC # BLD AUTO: 3.89 10*6/MM3 (ref 4.14–5.8)
VANCOMYCIN TROUGH SERPL-MCNC: 14 MCG/ML (ref 5–20)
WBC # BLD AUTO: 11.3 10*3/MM3 (ref 3.4–10.8)

## 2021-07-20 PROCEDURE — 85025 COMPLETE CBC W/AUTO DIFF WBC: CPT | Performed by: FAMILY MEDICINE

## 2021-07-20 PROCEDURE — 25010000002 VANCOMYCIN PER 500 MG: Performed by: INTERNAL MEDICINE

## 2021-07-20 PROCEDURE — 25010000002 HYDROMORPHONE PER 4 MG: Performed by: FAMILY MEDICINE

## 2021-07-20 PROCEDURE — 25010000002 CEFEPIME PER 500 MG: Performed by: FAMILY MEDICINE

## 2021-07-20 PROCEDURE — 63710000001 INSULIN LISPRO (HUMAN) PER 5 UNITS: Performed by: INTERNAL MEDICINE

## 2021-07-20 PROCEDURE — 25010000002 VANCOMYCIN 10 G RECONSTITUTED SOLUTION: Performed by: INTERNAL MEDICINE

## 2021-07-20 PROCEDURE — 99232 SBSQ HOSP IP/OBS MODERATE 35: CPT | Performed by: INTERNAL MEDICINE

## 2021-07-20 PROCEDURE — 82962 GLUCOSE BLOOD TEST: CPT

## 2021-07-20 PROCEDURE — 25010000002 ONDANSETRON PER 1 MG: Performed by: INTERNAL MEDICINE

## 2021-07-20 PROCEDURE — 80202 ASSAY OF VANCOMYCIN: CPT | Performed by: INTERNAL MEDICINE

## 2021-07-20 PROCEDURE — 94799 UNLISTED PULMONARY SVC/PX: CPT

## 2021-07-20 PROCEDURE — 63710000001 INSULIN GLARGINE PER 5 UNITS: Performed by: FAMILY MEDICINE

## 2021-07-20 RX ADMIN — SODIUM CHLORIDE, PRESERVATIVE FREE 3 ML: 5 INJECTION INTRAVENOUS at 20:32

## 2021-07-20 RX ADMIN — HYDROMORPHONE HYDROCHLORIDE 1 MG: 2 INJECTION, SOLUTION INTRAMUSCULAR; INTRAVENOUS; SUBCUTANEOUS at 07:07

## 2021-07-20 RX ADMIN — HYDROMORPHONE HYDROCHLORIDE 1 MG: 2 INJECTION, SOLUTION INTRAMUSCULAR; INTRAVENOUS; SUBCUTANEOUS at 20:32

## 2021-07-20 RX ADMIN — OXYCODONE HYDROCHLORIDE 10 MG: 5 TABLET ORAL at 06:04

## 2021-07-20 RX ADMIN — SODIUM CHLORIDE, PRESERVATIVE FREE 3 ML: 5 INJECTION INTRAVENOUS at 09:10

## 2021-07-20 RX ADMIN — ALBUTEROL SULFATE 2 PUFF: 108 AEROSOL, METERED RESPIRATORY (INHALATION) at 20:34

## 2021-07-20 RX ADMIN — BUDESONIDE 0.5 MG: 0.5 SUSPENSION RESPIRATORY (INHALATION) at 20:37

## 2021-07-20 RX ADMIN — PREGABALIN 150 MG: 75 CAPSULE ORAL at 20:31

## 2021-07-20 RX ADMIN — INSULIN LISPRO 2 UNITS: 100 INJECTION, SOLUTION INTRAVENOUS; SUBCUTANEOUS at 07:43

## 2021-07-20 RX ADMIN — INSULIN LISPRO 4 UNITS: 100 INJECTION, SOLUTION INTRAVENOUS; SUBCUTANEOUS at 12:09

## 2021-07-20 RX ADMIN — CEFEPIME 2 G: 2 INJECTION, POWDER, FOR SOLUTION INTRAVENOUS at 01:38

## 2021-07-20 RX ADMIN — INSULIN LISPRO 2 UNITS: 100 INJECTION, SOLUTION INTRAVENOUS; SUBCUTANEOUS at 17:25

## 2021-07-20 RX ADMIN — PREGABALIN 150 MG: 75 CAPSULE ORAL at 09:10

## 2021-07-20 RX ADMIN — ALBUTEROL SULFATE 2 PUFF: 108 AEROSOL, METERED RESPIRATORY (INHALATION) at 11:21

## 2021-07-20 RX ADMIN — VANCOMYCIN HYDROCHLORIDE 1500 MG: 10 INJECTION, POWDER, LYOPHILIZED, FOR SOLUTION INTRAVENOUS at 11:29

## 2021-07-20 RX ADMIN — TRAZODONE HYDROCHLORIDE 100 MG: 100 TABLET ORAL at 20:32

## 2021-07-20 RX ADMIN — OXYCODONE HYDROCHLORIDE 10 MG: 5 TABLET ORAL at 14:18

## 2021-07-20 RX ADMIN — MICONAZOLE NITRATE 1 APPLICATION: 20 CREAM TOPICAL at 09:11

## 2021-07-20 RX ADMIN — HYDROMORPHONE HYDROCHLORIDE 1 MG: 2 INJECTION, SOLUTION INTRAMUSCULAR; INTRAVENOUS; SUBCUTANEOUS at 11:50

## 2021-07-20 RX ADMIN — MIRTAZAPINE 30 MG: 15 TABLET, FILM COATED ORAL at 20:32

## 2021-07-20 RX ADMIN — CEFEPIME 2 G: 2 INJECTION, POWDER, FOR SOLUTION INTRAVENOUS at 07:43

## 2021-07-20 RX ADMIN — ALBUTEROL SULFATE 2 PUFF: 108 AEROSOL, METERED RESPIRATORY (INHALATION) at 06:55

## 2021-07-20 RX ADMIN — ONDANSETRON 4 MG: 2 INJECTION INTRAMUSCULAR; INTRAVENOUS at 19:11

## 2021-07-20 RX ADMIN — ASPIRIN 81 MG: 81 TABLET, COATED ORAL at 09:10

## 2021-07-20 RX ADMIN — BUDESONIDE 0.5 MG: 0.5 SUSPENSION RESPIRATORY (INHALATION) at 06:55

## 2021-07-20 RX ADMIN — HYDROMORPHONE HYDROCHLORIDE 1 MG: 2 INJECTION, SOLUTION INTRAMUSCULAR; INTRAVENOUS; SUBCUTANEOUS at 16:10

## 2021-07-20 RX ADMIN — INSULIN GLARGINE 10 UNITS: 100 INJECTION, SOLUTION SUBCUTANEOUS at 20:31

## 2021-07-20 RX ADMIN — ROPINIROLE HYDROCHLORIDE 0.5 MG: 0.25 TABLET, FILM COATED ORAL at 20:32

## 2021-07-20 RX ADMIN — ALBUTEROL SULFATE 2 PUFF: 108 AEROSOL, METERED RESPIRATORY (INHALATION) at 15:27

## 2021-07-20 RX ADMIN — HYDROMORPHONE HYDROCHLORIDE 1 MG: 2 INJECTION, SOLUTION INTRAMUSCULAR; INTRAVENOUS; SUBCUTANEOUS at 03:15

## 2021-07-20 RX ADMIN — ATORVASTATIN CALCIUM 40 MG: 40 TABLET, FILM COATED ORAL at 06:10

## 2021-07-20 RX ADMIN — MICONAZOLE NITRATE 1 APPLICATION: 20 CREAM TOPICAL at 20:32

## 2021-07-20 RX ADMIN — CLOPIDOGREL BISULFATE 75 MG: 75 TABLET ORAL at 09:10

## 2021-07-21 LAB
ANION GAP SERPL CALCULATED.3IONS-SCNC: 10 MMOL/L (ref 5–15)
BACTERIA SPEC AEROBE CULT: ABNORMAL
BASOPHILS # BLD AUTO: 0.1 10*3/MM3 (ref 0–0.2)
BASOPHILS NFR BLD AUTO: 0.6 % (ref 0–1.5)
BUN SERPL-MCNC: 8 MG/DL (ref 8–23)
BUN/CREAT SERPL: 8.3 (ref 7–25)
CALCIUM SPEC-SCNC: 8.5 MG/DL (ref 8.6–10.5)
CHLORIDE SERPL-SCNC: 97 MMOL/L (ref 98–107)
CO2 SERPL-SCNC: 25 MMOL/L (ref 22–29)
CREAT SERPL-MCNC: 0.96 MG/DL (ref 0.76–1.27)
DEPRECATED RDW RBC AUTO: 46.8 FL (ref 37–54)
EOSINOPHIL # BLD AUTO: 0.3 10*3/MM3 (ref 0–0.4)
EOSINOPHIL NFR BLD AUTO: 2.1 % (ref 0.3–6.2)
ERYTHROCYTE [DISTWIDTH] IN BLOOD BY AUTOMATED COUNT: 16.2 % (ref 12.3–15.4)
GFR SERPL CREATININE-BSD FRML MDRD: 77 ML/MIN/1.73
GLUCOSE BLDC GLUCOMTR-MCNC: 155 MG/DL (ref 70–105)
GLUCOSE BLDC GLUCOMTR-MCNC: 162 MG/DL (ref 70–105)
GLUCOSE BLDC GLUCOMTR-MCNC: 170 MG/DL (ref 70–105)
GLUCOSE BLDC GLUCOMTR-MCNC: 204 MG/DL (ref 70–105)
GLUCOSE SERPL-MCNC: 173 MG/DL (ref 65–99)
GRAM STN SPEC: ABNORMAL
HCT VFR BLD AUTO: 35 % (ref 37.5–51)
HGB BLD-MCNC: 11.7 G/DL (ref 13–17.7)
ISOLATED FROM: ABNORMAL
LYMPHOCYTES # BLD AUTO: 0.6 10*3/MM3 (ref 0.7–3.1)
LYMPHOCYTES NFR BLD AUTO: 4.9 % (ref 19.6–45.3)
MCH RBC QN AUTO: 27.3 PG (ref 26.6–33)
MCHC RBC AUTO-ENTMCNC: 33.5 G/DL (ref 31.5–35.7)
MCV RBC AUTO: 81.5 FL (ref 79–97)
MONOCYTES # BLD AUTO: 0.9 10*3/MM3 (ref 0.1–0.9)
MONOCYTES NFR BLD AUTO: 7.4 % (ref 5–12)
NEUTROPHILS NFR BLD AUTO: 10.7 10*3/MM3 (ref 1.7–7)
NEUTROPHILS NFR BLD AUTO: 85 % (ref 42.7–76)
NRBC BLD AUTO-RTO: 0 /100 WBC (ref 0–0.2)
PLATELET # BLD AUTO: 442 10*3/MM3 (ref 140–450)
PMV BLD AUTO: 7.2 FL (ref 6–12)
POTASSIUM SERPL-SCNC: 4.3 MMOL/L (ref 3.5–5.2)
RBC # BLD AUTO: 4.3 10*6/MM3 (ref 4.14–5.8)
SODIUM SERPL-SCNC: 132 MMOL/L (ref 136–145)
WBC # BLD AUTO: 12.6 10*3/MM3 (ref 3.4–10.8)

## 2021-07-21 PROCEDURE — 94799 UNLISTED PULMONARY SVC/PX: CPT

## 2021-07-21 PROCEDURE — 25010000002 HYDROMORPHONE PER 4 MG: Performed by: FAMILY MEDICINE

## 2021-07-21 PROCEDURE — 63710000001 INSULIN GLARGINE PER 5 UNITS: Performed by: FAMILY MEDICINE

## 2021-07-21 PROCEDURE — 25010000002 VANCOMYCIN 10 G RECONSTITUTED SOLUTION: Performed by: INTERNAL MEDICINE

## 2021-07-21 PROCEDURE — 99232 SBSQ HOSP IP/OBS MODERATE 35: CPT | Performed by: INTERNAL MEDICINE

## 2021-07-21 PROCEDURE — 25010000002 ONDANSETRON PER 1 MG: Performed by: INTERNAL MEDICINE

## 2021-07-21 PROCEDURE — G0108 DIAB MANAGE TRN  PER INDIV: HCPCS

## 2021-07-21 PROCEDURE — 63710000001 INSULIN LISPRO (HUMAN) PER 5 UNITS: Performed by: INTERNAL MEDICINE

## 2021-07-21 PROCEDURE — 85025 COMPLETE CBC W/AUTO DIFF WBC: CPT | Performed by: FAMILY MEDICINE

## 2021-07-21 PROCEDURE — 82962 GLUCOSE BLOOD TEST: CPT

## 2021-07-21 PROCEDURE — 80048 BASIC METABOLIC PNL TOTAL CA: CPT | Performed by: FAMILY MEDICINE

## 2021-07-21 RX ORDER — SCOLOPAMINE TRANSDERMAL SYSTEM 1 MG/1
1 PATCH, EXTENDED RELEASE TRANSDERMAL
Status: DISCONTINUED | OUTPATIENT
Start: 2021-07-21 | End: 2021-07-24 | Stop reason: HOSPADM

## 2021-07-21 RX ADMIN — INSULIN GLARGINE 10 UNITS: 100 INJECTION, SOLUTION SUBCUTANEOUS at 20:20

## 2021-07-21 RX ADMIN — OXYCODONE HYDROCHLORIDE 10 MG: 5 TABLET ORAL at 20:48

## 2021-07-21 RX ADMIN — BUDESONIDE 0.5 MG: 0.5 SUSPENSION RESPIRATORY (INHALATION) at 08:05

## 2021-07-21 RX ADMIN — VANCOMYCIN HYDROCHLORIDE 1250 MG: 10 INJECTION, POWDER, LYOPHILIZED, FOR SOLUTION INTRAVENOUS at 00:06

## 2021-07-21 RX ADMIN — MICONAZOLE NITRATE 1 APPLICATION: 20 CREAM TOPICAL at 11:11

## 2021-07-21 RX ADMIN — ASPIRIN 81 MG: 81 TABLET, COATED ORAL at 09:15

## 2021-07-21 RX ADMIN — ROPINIROLE HYDROCHLORIDE 0.5 MG: 0.25 TABLET, FILM COATED ORAL at 20:17

## 2021-07-21 RX ADMIN — HYDROMORPHONE HYDROCHLORIDE 1 MG: 2 INJECTION, SOLUTION INTRAMUSCULAR; INTRAVENOUS; SUBCUTANEOUS at 05:02

## 2021-07-21 RX ADMIN — SODIUM CHLORIDE 100 ML/HR: 9 INJECTION, SOLUTION INTRAVENOUS at 22:53

## 2021-07-21 RX ADMIN — ALBUTEROL SULFATE 2 PUFF: 108 AEROSOL, METERED RESPIRATORY (INHALATION) at 08:07

## 2021-07-21 RX ADMIN — BUDESONIDE 0.5 MG: 0.5 SUSPENSION RESPIRATORY (INHALATION) at 21:32

## 2021-07-21 RX ADMIN — HYDROMORPHONE HYDROCHLORIDE 1 MG: 2 INJECTION, SOLUTION INTRAMUSCULAR; INTRAVENOUS; SUBCUTANEOUS at 13:35

## 2021-07-21 RX ADMIN — ONDANSETRON 4 MG: 2 INJECTION INTRAMUSCULAR; INTRAVENOUS at 09:15

## 2021-07-21 RX ADMIN — CLOPIDOGREL BISULFATE 75 MG: 75 TABLET ORAL at 09:15

## 2021-07-21 RX ADMIN — MIRTAZAPINE 30 MG: 15 TABLET, FILM COATED ORAL at 20:17

## 2021-07-21 RX ADMIN — ALBUTEROL SULFATE 2 PUFF: 108 AEROSOL, METERED RESPIRATORY (INHALATION) at 16:13

## 2021-07-21 RX ADMIN — HYDROMORPHONE HYDROCHLORIDE 1 MG: 2 INJECTION, SOLUTION INTRAMUSCULAR; INTRAVENOUS; SUBCUTANEOUS at 17:53

## 2021-07-21 RX ADMIN — SODIUM CHLORIDE, PRESERVATIVE FREE 3 ML: 5 INJECTION INTRAVENOUS at 20:18

## 2021-07-21 RX ADMIN — OXYCODONE HYDROCHLORIDE 10 MG: 5 TABLET ORAL at 03:00

## 2021-07-21 RX ADMIN — TRAZODONE HYDROCHLORIDE 100 MG: 100 TABLET ORAL at 20:18

## 2021-07-21 RX ADMIN — MICONAZOLE NITRATE 1 APPLICATION: 20 CREAM TOPICAL at 20:20

## 2021-07-21 RX ADMIN — HYDROMORPHONE HYDROCHLORIDE 1 MG: 2 INJECTION, SOLUTION INTRAMUSCULAR; INTRAVENOUS; SUBCUTANEOUS at 09:15

## 2021-07-21 RX ADMIN — HYDROMORPHONE HYDROCHLORIDE 1 MG: 2 INJECTION, SOLUTION INTRAMUSCULAR; INTRAVENOUS; SUBCUTANEOUS at 00:38

## 2021-07-21 RX ADMIN — SCOLOPAMINE TRANSDERMAL SYSTEM 1 PATCH: 1 PATCH, EXTENDED RELEASE TRANSDERMAL at 15:46

## 2021-07-21 RX ADMIN — ATORVASTATIN CALCIUM 40 MG: 40 TABLET, FILM COATED ORAL at 06:05

## 2021-07-21 RX ADMIN — SODIUM CHLORIDE 100 ML/HR: 9 INJECTION, SOLUTION INTRAVENOUS at 03:00

## 2021-07-21 RX ADMIN — VANCOMYCIN HYDROCHLORIDE 1250 MG: 10 INJECTION, POWDER, LYOPHILIZED, FOR SOLUTION INTRAVENOUS at 12:04

## 2021-07-21 RX ADMIN — OXYCODONE HYDROCHLORIDE 10 MG: 5 TABLET ORAL at 15:46

## 2021-07-21 RX ADMIN — PREGABALIN 150 MG: 75 CAPSULE ORAL at 20:17

## 2021-07-21 RX ADMIN — ONDANSETRON 4 MG: 2 INJECTION INTRAMUSCULAR; INTRAVENOUS at 16:14

## 2021-07-21 RX ADMIN — INSULIN LISPRO 2 UNITS: 100 INJECTION, SOLUTION INTRAVENOUS; SUBCUTANEOUS at 17:17

## 2021-07-21 RX ADMIN — SODIUM CHLORIDE 100 ML/HR: 9 INJECTION, SOLUTION INTRAVENOUS at 12:10

## 2021-07-21 RX ADMIN — ALBUTEROL SULFATE 2 PUFF: 108 AEROSOL, METERED RESPIRATORY (INHALATION) at 21:33

## 2021-07-21 RX ADMIN — INSULIN LISPRO 2 UNITS: 100 INJECTION, SOLUTION INTRAVENOUS; SUBCUTANEOUS at 09:15

## 2021-07-21 RX ADMIN — PREGABALIN 150 MG: 75 CAPSULE ORAL at 09:15

## 2021-07-21 RX ADMIN — SODIUM CHLORIDE, PRESERVATIVE FREE 3 ML: 5 INJECTION INTRAVENOUS at 09:15

## 2021-07-21 RX ADMIN — INSULIN LISPRO 2 UNITS: 100 INJECTION, SOLUTION INTRAVENOUS; SUBCUTANEOUS at 12:04

## 2021-07-22 ENCOUNTER — APPOINTMENT (OUTPATIENT)
Dept: CARDIOLOGY | Facility: HOSPITAL | Age: 76
End: 2021-07-22

## 2021-07-22 ENCOUNTER — ANESTHESIA (OUTPATIENT)
Dept: CARDIOLOGY | Facility: HOSPITAL | Age: 76
End: 2021-07-22

## 2021-07-22 ENCOUNTER — ANESTHESIA EVENT (OUTPATIENT)
Dept: CARDIOLOGY | Facility: HOSPITAL | Age: 76
End: 2021-07-22

## 2021-07-22 VITALS — DIASTOLIC BLOOD PRESSURE: 48 MMHG | SYSTOLIC BLOOD PRESSURE: 102 MMHG | OXYGEN SATURATION: 100 %

## 2021-07-22 LAB
BH CV ECHO MEAS - RAP SYSTOLE: 10 MMHG
BH CV ECHO MEAS - RVSP: 22.8 MMHG
BH CV ECHO MEAS - TR MAX VEL: 178.7 CM/SEC
GLUCOSE BLDC GLUCOMTR-MCNC: 123 MG/DL (ref 70–105)
GLUCOSE BLDC GLUCOMTR-MCNC: 128 MG/DL (ref 70–105)
GLUCOSE BLDC GLUCOMTR-MCNC: 187 MG/DL (ref 70–105)
GLUCOSE BLDC GLUCOMTR-MCNC: 221 MG/DL (ref 70–105)
MAXIMAL PREDICTED HEART RATE: 146 BPM
STRESS TARGET HR: 124 BPM
VANCOMYCIN SERPL-MCNC: 16.6 MCG/ML (ref 5–40)
VANCOMYCIN SERPL-MCNC: 27.6 MCG/ML (ref 5–40)

## 2021-07-22 PROCEDURE — 25010000002 ONDANSETRON PER 1 MG: Performed by: INTERNAL MEDICINE

## 2021-07-22 PROCEDURE — 63710000001 INSULIN LISPRO (HUMAN) PER 5 UNITS: Performed by: INTERNAL MEDICINE

## 2021-07-22 PROCEDURE — 93320 DOPPLER ECHO COMPLETE: CPT

## 2021-07-22 PROCEDURE — 94799 UNLISTED PULMONARY SVC/PX: CPT

## 2021-07-22 PROCEDURE — 99232 SBSQ HOSP IP/OBS MODERATE 35: CPT | Performed by: INTERNAL MEDICINE

## 2021-07-22 PROCEDURE — 25010000002 HYDROMORPHONE PER 4 MG: Performed by: FAMILY MEDICINE

## 2021-07-22 PROCEDURE — 80202 ASSAY OF VANCOMYCIN: CPT | Performed by: INTERNAL MEDICINE

## 2021-07-22 PROCEDURE — 93325 DOPPLER ECHO COLOR FLOW MAPG: CPT | Performed by: INTERNAL MEDICINE

## 2021-07-22 PROCEDURE — 82962 GLUCOSE BLOOD TEST: CPT

## 2021-07-22 PROCEDURE — 93312 ECHO TRANSESOPHAGEAL: CPT | Performed by: INTERNAL MEDICINE

## 2021-07-22 PROCEDURE — 25010000002 PROPOFOL 10 MG/ML EMULSION: Performed by: ANESTHESIOLOGY

## 2021-07-22 PROCEDURE — 93325 DOPPLER ECHO COLOR FLOW MAPG: CPT

## 2021-07-22 PROCEDURE — 25010000002 VANCOMYCIN: Performed by: INTERNAL MEDICINE

## 2021-07-22 PROCEDURE — 25010000002 VANCOMYCIN 10 G RECONSTITUTED SOLUTION: Performed by: INTERNAL MEDICINE

## 2021-07-22 PROCEDURE — 93320 DOPPLER ECHO COMPLETE: CPT | Performed by: INTERNAL MEDICINE

## 2021-07-22 PROCEDURE — 93312 ECHO TRANSESOPHAGEAL: CPT

## 2021-07-22 PROCEDURE — 63710000001 INSULIN GLARGINE PER 5 UNITS: Performed by: FAMILY MEDICINE

## 2021-07-22 RX ORDER — PROPOFOL 10 MG/ML
VIAL (ML) INTRAVENOUS AS NEEDED
Status: DISCONTINUED | OUTPATIENT
Start: 2021-07-22 | End: 2021-07-22 | Stop reason: SURG

## 2021-07-22 RX ADMIN — VANCOMYCIN HYDROCHLORIDE 1250 MG: 10 INJECTION, POWDER, LYOPHILIZED, FOR SOLUTION INTRAVENOUS at 15:15

## 2021-07-22 RX ADMIN — HYDROMORPHONE HYDROCHLORIDE 1 MG: 2 INJECTION, SOLUTION INTRAMUSCULAR; INTRAVENOUS; SUBCUTANEOUS at 19:12

## 2021-07-22 RX ADMIN — HYDROMORPHONE HYDROCHLORIDE 1 MG: 2 INJECTION, SOLUTION INTRAMUSCULAR; INTRAVENOUS; SUBCUTANEOUS at 03:42

## 2021-07-22 RX ADMIN — ALBUTEROL SULFATE 2 PUFF: 108 AEROSOL, METERED RESPIRATORY (INHALATION) at 19:45

## 2021-07-22 RX ADMIN — MICONAZOLE NITRATE 1 APPLICATION: 20 CREAM TOPICAL at 21:07

## 2021-07-22 RX ADMIN — VANCOMYCIN HYDROCHLORIDE 1250 MG: 10 INJECTION, POWDER, LYOPHILIZED, FOR SOLUTION INTRAVENOUS at 00:36

## 2021-07-22 RX ADMIN — INSULIN LISPRO 2 UNITS: 100 INJECTION, SOLUTION INTRAVENOUS; SUBCUTANEOUS at 17:54

## 2021-07-22 RX ADMIN — HYDROMORPHONE HYDROCHLORIDE 1 MG: 2 INJECTION, SOLUTION INTRAMUSCULAR; INTRAVENOUS; SUBCUTANEOUS at 14:43

## 2021-07-22 RX ADMIN — HYDROMORPHONE HYDROCHLORIDE 1 MG: 2 INJECTION, SOLUTION INTRAMUSCULAR; INTRAVENOUS; SUBCUTANEOUS at 23:17

## 2021-07-22 RX ADMIN — ROPINIROLE HYDROCHLORIDE 0.5 MG: 0.25 TABLET, FILM COATED ORAL at 21:06

## 2021-07-22 RX ADMIN — BUDESONIDE 0.5 MG: 0.5 SUSPENSION RESPIRATORY (INHALATION) at 06:54

## 2021-07-22 RX ADMIN — ATORVASTATIN CALCIUM 40 MG: 40 TABLET, FILM COATED ORAL at 10:30

## 2021-07-22 RX ADMIN — PREGABALIN 150 MG: 75 CAPSULE ORAL at 10:30

## 2021-07-22 RX ADMIN — SODIUM CHLORIDE, PRESERVATIVE FREE 3 ML: 5 INJECTION INTRAVENOUS at 10:30

## 2021-07-22 RX ADMIN — ASPIRIN 81 MG: 81 TABLET, COATED ORAL at 10:30

## 2021-07-22 RX ADMIN — OXYCODONE HYDROCHLORIDE 10 MG: 5 TABLET ORAL at 21:05

## 2021-07-22 RX ADMIN — ONDANSETRON 4 MG: 2 INJECTION INTRAMUSCULAR; INTRAVENOUS at 14:43

## 2021-07-22 RX ADMIN — MICONAZOLE NITRATE 1 APPLICATION: 20 CREAM TOPICAL at 10:30

## 2021-07-22 RX ADMIN — SODIUM CHLORIDE, PRESERVATIVE FREE 3 ML: 5 INJECTION INTRAVENOUS at 21:07

## 2021-07-22 RX ADMIN — ALBUTEROL SULFATE 2 PUFF: 108 AEROSOL, METERED RESPIRATORY (INHALATION) at 14:39

## 2021-07-22 RX ADMIN — TRAZODONE HYDROCHLORIDE 100 MG: 100 TABLET ORAL at 21:06

## 2021-07-22 RX ADMIN — ALBUTEROL SULFATE 2 PUFF: 108 AEROSOL, METERED RESPIRATORY (INHALATION) at 06:54

## 2021-07-22 RX ADMIN — HYDROMORPHONE HYDROCHLORIDE 1 MG: 2 INJECTION, SOLUTION INTRAMUSCULAR; INTRAVENOUS; SUBCUTANEOUS at 10:30

## 2021-07-22 RX ADMIN — PREGABALIN 150 MG: 75 CAPSULE ORAL at 21:05

## 2021-07-22 RX ADMIN — CLOPIDOGREL BISULFATE 75 MG: 75 TABLET ORAL at 10:30

## 2021-07-22 RX ADMIN — INSULIN GLARGINE 10 UNITS: 100 INJECTION, SOLUTION SUBCUTANEOUS at 21:05

## 2021-07-22 RX ADMIN — BUDESONIDE 0.5 MG: 0.5 SUSPENSION RESPIRATORY (INHALATION) at 19:50

## 2021-07-22 RX ADMIN — MIRTAZAPINE 30 MG: 15 TABLET, FILM COATED ORAL at 21:05

## 2021-07-22 RX ADMIN — PROPOFOL 140 MG: 10 INJECTION, EMULSION INTRAVENOUS at 09:05

## 2021-07-22 RX ADMIN — ALBUTEROL SULFATE 2 PUFF: 108 AEROSOL, METERED RESPIRATORY (INHALATION) at 10:55

## 2021-07-22 NOTE — ANESTHESIA POSTPROCEDURE EVALUATION
Patient: Ro Nathan    Procedure Summary     Date: 07/22/21 Room / Location: [facility]    Anesthesia Start: 0903 Anesthesia Stop: 0915    Procedure: ADULT TRANSESOPHAGEAL ECHO (EMBER) W/ CONT IF NECESSARY PER PROTOCOL Diagnosis: (Endocarditis)    Scheduled Providers: Celine Swanson MD Provider: Jose Interiano MD    Anesthesia Type: MAC ASA Status: 4          Anesthesia Type: MAC    Vitals  Vitals Value Taken Time   /85 07/22/21 1001   Temp     Pulse 70 07/22/21 1055   Resp 20 07/22/21 0916   SpO2 97 % 07/22/21 1055           Post Anesthesia Care and Evaluation    Patient location during evaluation: PACU  Patient participation: complete - patient participated  Level of consciousness: awake  Pain scale: See nurse's notes for pain score.  Pain management: adequate  Airway patency: patent  Anesthetic complications: No anesthetic complications  PONV Status: none  Cardiovascular status: acceptable  Respiratory status: acceptable  Hydration status: acceptable    Comments: Patient seen and examined postoperatively; vital signs stable; SpO2 greater than or equal to 90%; cardiopulmonary status stable; nausea/vomiting adequately controlled; pain adequately controlled; no apparent anesthesia complications; patient discharged from anesthesia care when discharge criteria were met

## 2021-07-22 NOTE — ANESTHESIA PREPROCEDURE EVALUATION
Anesthesia Evaluation     Patient summary reviewed and Nursing notes reviewed   no history of anesthetic complications:  NPO Solid Status: > 8 hours  NPO Liquid Status: > 8 hours           Airway   Mallampati: II  TM distance: >3 FB  Neck ROM: full  No difficulty expected  Dental    (+) edentulous    Pulmonary     breath sounds clear to auscultation  (+) a smoker Former, COPD, sleep apnea on CPAP,   Cardiovascular     ECG reviewed  Rhythm: regular  Rate: normal    (+) pacemaker pacemaker interrogated unknown, hypertension, CAD, dysrhythmias Paroxysmal Atrial Fib, CHF , PVD,       Neuro/Psych  (+) numbness,     GI/Hepatic/Renal/Endo    (+) obesity,  hiatal hernia, GERD,  liver disease, renal disease CRI, diabetes mellitus type 2 poorly controlled using insulin,     Musculoskeletal     Abdominal     Abdomen: soft.   Substance History - negative use     OB/GYN negative ob/gyn ROS         Other   arthritis,    history of cancer remission                      Anesthesia Plan    ASA 4     MAC     intravenous induction     Anesthetic plan, all risks, benefits, and alternatives have been provided, discussed and informed consent has been obtained with: patient.

## 2021-07-23 ENCOUNTER — APPOINTMENT (OUTPATIENT)
Dept: GENERAL RADIOLOGY | Facility: HOSPITAL | Age: 76
End: 2021-07-23

## 2021-07-23 LAB
ANION GAP SERPL CALCULATED.3IONS-SCNC: 10 MMOL/L (ref 5–15)
BACTERIA SPEC AEROBE CULT: ABNORMAL
BUN SERPL-MCNC: 8 MG/DL (ref 8–23)
BUN/CREAT SERPL: 9.6 (ref 7–25)
CALCIUM SPEC-SCNC: 8.3 MG/DL (ref 8.6–10.5)
CHLORIDE SERPL-SCNC: 99 MMOL/L (ref 98–107)
CO2 SERPL-SCNC: 25 MMOL/L (ref 22–29)
CREAT SERPL-MCNC: 0.83 MG/DL (ref 0.76–1.27)
DEPRECATED RDW RBC AUTO: 46.8 FL (ref 37–54)
ERYTHROCYTE [DISTWIDTH] IN BLOOD BY AUTOMATED COUNT: 16.1 % (ref 12.3–15.4)
GFR SERPL CREATININE-BSD FRML MDRD: 91 ML/MIN/1.73
GLUCOSE BLDC GLUCOMTR-MCNC: 168 MG/DL (ref 70–105)
GLUCOSE BLDC GLUCOMTR-MCNC: 185 MG/DL (ref 70–105)
GLUCOSE BLDC GLUCOMTR-MCNC: 222 MG/DL (ref 70–105)
GLUCOSE BLDC GLUCOMTR-MCNC: 263 MG/DL (ref 70–105)
GLUCOSE SERPL-MCNC: 158 MG/DL (ref 65–99)
GRAM STN SPEC: ABNORMAL
HCT VFR BLD AUTO: 33.3 % (ref 37.5–51)
HGB BLD-MCNC: 11.2 G/DL (ref 13–17.7)
ISOLATED FROM: ABNORMAL
MCH RBC QN AUTO: 27.3 PG (ref 26.6–33)
MCHC RBC AUTO-ENTMCNC: 33.5 G/DL (ref 31.5–35.7)
MCV RBC AUTO: 81.5 FL (ref 79–97)
PLATELET # BLD AUTO: 450 10*3/MM3 (ref 140–450)
PMV BLD AUTO: 7.1 FL (ref 6–12)
POTASSIUM SERPL-SCNC: 4.1 MMOL/L (ref 3.5–5.2)
RBC # BLD AUTO: 4.08 10*6/MM3 (ref 4.14–5.8)
SODIUM SERPL-SCNC: 134 MMOL/L (ref 136–145)
VANCOMYCIN SERPL-MCNC: 18 MCG/ML (ref 5–40)
WBC # BLD AUTO: 10.1 10*3/MM3 (ref 3.4–10.8)

## 2021-07-23 PROCEDURE — 25010000002 VANCOMYCIN 10 G RECONSTITUTED SOLUTION: Performed by: INTERNAL MEDICINE

## 2021-07-23 PROCEDURE — 80048 BASIC METABOLIC PNL TOTAL CA: CPT | Performed by: FAMILY MEDICINE

## 2021-07-23 PROCEDURE — 02HV33Z INSERTION OF INFUSION DEVICE INTO SUPERIOR VENA CAVA, PERCUTANEOUS APPROACH: ICD-10-PCS | Performed by: FAMILY MEDICINE

## 2021-07-23 PROCEDURE — 25010000002 HYDROMORPHONE PER 4 MG: Performed by: FAMILY MEDICINE

## 2021-07-23 PROCEDURE — C1751 CATH, INF, PER/CENT/MIDLINE: HCPCS

## 2021-07-23 PROCEDURE — 25010000002 ONDANSETRON PER 1 MG: Performed by: INTERNAL MEDICINE

## 2021-07-23 PROCEDURE — 25010000002 FUROSEMIDE PER 20 MG: Performed by: FAMILY MEDICINE

## 2021-07-23 PROCEDURE — 99232 SBSQ HOSP IP/OBS MODERATE 35: CPT | Performed by: INTERNAL MEDICINE

## 2021-07-23 PROCEDURE — 85027 COMPLETE CBC AUTOMATED: CPT | Performed by: FAMILY MEDICINE

## 2021-07-23 PROCEDURE — 63710000001 INSULIN LISPRO (HUMAN) PER 5 UNITS: Performed by: INTERNAL MEDICINE

## 2021-07-23 PROCEDURE — 80202 ASSAY OF VANCOMYCIN: CPT | Performed by: INTERNAL MEDICINE

## 2021-07-23 PROCEDURE — 71045 X-RAY EXAM CHEST 1 VIEW: CPT

## 2021-07-23 PROCEDURE — 94799 UNLISTED PULMONARY SVC/PX: CPT

## 2021-07-23 PROCEDURE — 82962 GLUCOSE BLOOD TEST: CPT

## 2021-07-23 PROCEDURE — 63710000001 INSULIN GLARGINE PER 5 UNITS: Performed by: FAMILY MEDICINE

## 2021-07-23 RX ORDER — FUROSEMIDE 10 MG/ML
20 INJECTION INTRAMUSCULAR; INTRAVENOUS ONCE
Status: COMPLETED | OUTPATIENT
Start: 2021-07-23 | End: 2021-07-23

## 2021-07-23 RX ORDER — ISOPROPYL ALCOHOL 700 MG/ML
1 CLOTH TOPICAL
Qty: 100 EACH | Refills: 2 | Status: CANCELLED | OUTPATIENT
Start: 2021-07-23

## 2021-07-23 RX ORDER — PEN NEEDLE, DIABETIC 30 GX3/16"
1 NEEDLE, DISPOSABLE MISCELLANEOUS NIGHTLY
Qty: 100 EACH | Refills: 0 | Status: CANCELLED | OUTPATIENT
Start: 2021-07-23

## 2021-07-23 RX ORDER — BLOOD SUGAR DIAGNOSTIC
1 STRIP MISCELLANEOUS
Qty: 100 EACH | Refills: 2 | Status: CANCELLED | OUTPATIENT
Start: 2021-07-23

## 2021-07-23 RX ORDER — LANCETS
1 EACH MISCELLANEOUS
Qty: 100 EACH | Refills: 2 | Status: CANCELLED | OUTPATIENT
Start: 2021-07-23

## 2021-07-23 RX ORDER — ALBUTEROL SULFATE 2.5 MG/3ML
2.5 SOLUTION RESPIRATORY (INHALATION)
Status: DISCONTINUED | OUTPATIENT
Start: 2021-07-23 | End: 2021-07-24 | Stop reason: HOSPADM

## 2021-07-23 RX ADMIN — BUDESONIDE 0.5 MG: 0.5 SUSPENSION RESPIRATORY (INHALATION) at 07:21

## 2021-07-23 RX ADMIN — ALBUTEROL SULFATE 2.5 MG: 2.5 SOLUTION RESPIRATORY (INHALATION) at 15:52

## 2021-07-23 RX ADMIN — ATORVASTATIN CALCIUM 40 MG: 40 TABLET, FILM COATED ORAL at 06:32

## 2021-07-23 RX ADMIN — APIXABAN 5 MG: 5 TABLET, FILM COATED ORAL at 08:00

## 2021-07-23 RX ADMIN — INSULIN LISPRO 2 UNITS: 100 INJECTION, SOLUTION INTRAVENOUS; SUBCUTANEOUS at 12:04

## 2021-07-23 RX ADMIN — TRAZODONE HYDROCHLORIDE 100 MG: 100 TABLET ORAL at 20:55

## 2021-07-23 RX ADMIN — ALBUTEROL SULFATE 2.5 MG: 2.5 SOLUTION RESPIRATORY (INHALATION) at 12:10

## 2021-07-23 RX ADMIN — SODIUM CHLORIDE, PRESERVATIVE FREE 3 ML: 5 INJECTION INTRAVENOUS at 20:56

## 2021-07-23 RX ADMIN — ALBUTEROL SULFATE 2.5 MG: 2.5 SOLUTION RESPIRATORY (INHALATION) at 07:21

## 2021-07-23 RX ADMIN — ONDANSETRON 4 MG: 2 INJECTION INTRAMUSCULAR; INTRAVENOUS at 08:00

## 2021-07-23 RX ADMIN — MICONAZOLE NITRATE 1 APPLICATION: 20 CREAM TOPICAL at 21:01

## 2021-07-23 RX ADMIN — ASPIRIN 81 MG: 81 TABLET, COATED ORAL at 08:00

## 2021-07-23 RX ADMIN — CLOPIDOGREL BISULFATE 75 MG: 75 TABLET ORAL at 08:00

## 2021-07-23 RX ADMIN — FUROSEMIDE 20 MG: 10 INJECTION, SOLUTION INTRAMUSCULAR; INTRAVENOUS at 10:54

## 2021-07-23 RX ADMIN — HYDROMORPHONE HYDROCHLORIDE 1 MG: 2 INJECTION, SOLUTION INTRAMUSCULAR; INTRAVENOUS; SUBCUTANEOUS at 17:20

## 2021-07-23 RX ADMIN — HYDROMORPHONE HYDROCHLORIDE 1 MG: 2 INJECTION, SOLUTION INTRAMUSCULAR; INTRAVENOUS; SUBCUTANEOUS at 03:59

## 2021-07-23 RX ADMIN — MIRTAZAPINE 30 MG: 15 TABLET, FILM COATED ORAL at 20:55

## 2021-07-23 RX ADMIN — INSULIN GLARGINE 10 UNITS: 100 INJECTION, SOLUTION SUBCUTANEOUS at 20:55

## 2021-07-23 RX ADMIN — PREGABALIN 150 MG: 75 CAPSULE ORAL at 08:00

## 2021-07-23 RX ADMIN — INSULIN LISPRO 6 UNITS: 100 INJECTION, SOLUTION INTRAVENOUS; SUBCUTANEOUS at 17:20

## 2021-07-23 RX ADMIN — HYDROMORPHONE HYDROCHLORIDE 1 MG: 2 INJECTION, SOLUTION INTRAMUSCULAR; INTRAVENOUS; SUBCUTANEOUS at 21:31

## 2021-07-23 RX ADMIN — VANCOMYCIN HYDROCHLORIDE 1250 MG: 10 INJECTION, POWDER, LYOPHILIZED, FOR SOLUTION INTRAVENOUS at 17:25

## 2021-07-23 RX ADMIN — MICONAZOLE NITRATE 1 APPLICATION: 20 CREAM TOPICAL at 08:02

## 2021-07-23 RX ADMIN — VANCOMYCIN HYDROCHLORIDE 1250 MG: 10 INJECTION, POWDER, LYOPHILIZED, FOR SOLUTION INTRAVENOUS at 04:52

## 2021-07-23 RX ADMIN — HYDROMORPHONE HYDROCHLORIDE 1 MG: 2 INJECTION, SOLUTION INTRAMUSCULAR; INTRAVENOUS; SUBCUTANEOUS at 08:00

## 2021-07-23 RX ADMIN — PREGABALIN 150 MG: 75 CAPSULE ORAL at 20:55

## 2021-07-23 RX ADMIN — ROPINIROLE HYDROCHLORIDE 0.5 MG: 0.25 TABLET, FILM COATED ORAL at 20:55

## 2021-07-23 RX ADMIN — APIXABAN 5 MG: 5 TABLET, FILM COATED ORAL at 20:55

## 2021-07-23 RX ADMIN — INSULIN LISPRO 2 UNITS: 100 INJECTION, SOLUTION INTRAVENOUS; SUBCUTANEOUS at 08:00

## 2021-07-23 RX ADMIN — SODIUM CHLORIDE, PRESERVATIVE FREE 3 ML: 5 INJECTION INTRAVENOUS at 08:00

## 2021-07-23 RX ADMIN — HYDROMORPHONE HYDROCHLORIDE 1 MG: 2 INJECTION, SOLUTION INTRAMUSCULAR; INTRAVENOUS; SUBCUTANEOUS at 12:10

## 2021-07-24 VITALS
SYSTOLIC BLOOD PRESSURE: 167 MMHG | RESPIRATION RATE: 20 BRPM | TEMPERATURE: 98.2 F | OXYGEN SATURATION: 97 % | HEIGHT: 72 IN | WEIGHT: 286.9 LBS | BODY MASS INDEX: 38.86 KG/M2 | HEART RATE: 76 BPM | DIASTOLIC BLOOD PRESSURE: 75 MMHG

## 2021-07-24 LAB
ANION GAP SERPL CALCULATED.3IONS-SCNC: 10 MMOL/L (ref 5–15)
BUN SERPL-MCNC: 8 MG/DL (ref 8–23)
BUN/CREAT SERPL: 9.4 (ref 7–25)
CALCIUM SPEC-SCNC: 8.5 MG/DL (ref 8.6–10.5)
CHLORIDE SERPL-SCNC: 98 MMOL/L (ref 98–107)
CO2 SERPL-SCNC: 24 MMOL/L (ref 22–29)
CREAT SERPL-MCNC: 0.85 MG/DL (ref 0.76–1.27)
GFR SERPL CREATININE-BSD FRML MDRD: 88 ML/MIN/1.73
GLUCOSE BLDC GLUCOMTR-MCNC: 143 MG/DL (ref 70–105)
GLUCOSE BLDC GLUCOMTR-MCNC: 199 MG/DL (ref 70–105)
GLUCOSE SERPL-MCNC: 168 MG/DL (ref 65–99)
POTASSIUM SERPL-SCNC: 3.9 MMOL/L (ref 3.5–5.2)
SODIUM SERPL-SCNC: 132 MMOL/L (ref 136–145)

## 2021-07-24 PROCEDURE — 94799 UNLISTED PULMONARY SVC/PX: CPT

## 2021-07-24 PROCEDURE — 25010000002 HYDROMORPHONE PER 4 MG: Performed by: FAMILY MEDICINE

## 2021-07-24 PROCEDURE — 25010000002 VANCOMYCIN 10 G RECONSTITUTED SOLUTION: Performed by: INTERNAL MEDICINE

## 2021-07-24 PROCEDURE — 80048 BASIC METABOLIC PNL TOTAL CA: CPT | Performed by: FAMILY MEDICINE

## 2021-07-24 PROCEDURE — 82962 GLUCOSE BLOOD TEST: CPT

## 2021-07-24 RX ORDER — IPRATROPIUM BROMIDE AND ALBUTEROL SULFATE 2.5; .5 MG/3ML; MG/3ML
3 SOLUTION RESPIRATORY (INHALATION) EVERY 4 HOURS PRN
Status: DISCONTINUED | OUTPATIENT
Start: 2021-07-24 | End: 2021-07-24 | Stop reason: HOSPADM

## 2021-07-24 RX ADMIN — ASPIRIN 81 MG: 81 TABLET, COATED ORAL at 08:11

## 2021-07-24 RX ADMIN — SODIUM CHLORIDE, PRESERVATIVE FREE 3 ML: 5 INJECTION INTRAVENOUS at 08:11

## 2021-07-24 RX ADMIN — BUDESONIDE 0.5 MG: 0.5 SUSPENSION RESPIRATORY (INHALATION) at 06:31

## 2021-07-24 RX ADMIN — HYDROMORPHONE HYDROCHLORIDE 1 MG: 2 INJECTION, SOLUTION INTRAMUSCULAR; INTRAVENOUS; SUBCUTANEOUS at 03:06

## 2021-07-24 RX ADMIN — ALBUTEROL SULFATE 2.5 MG: 2.5 SOLUTION RESPIRATORY (INHALATION) at 11:35

## 2021-07-24 RX ADMIN — MICONAZOLE NITRATE 1 APPLICATION: 20 CREAM TOPICAL at 08:12

## 2021-07-24 RX ADMIN — VANCOMYCIN HYDROCHLORIDE 1250 MG: 10 INJECTION, POWDER, LYOPHILIZED, FOR SOLUTION INTRAVENOUS at 03:06

## 2021-07-24 RX ADMIN — APIXABAN 5 MG: 5 TABLET, FILM COATED ORAL at 08:11

## 2021-07-24 RX ADMIN — PREGABALIN 150 MG: 75 CAPSULE ORAL at 08:11

## 2021-07-24 RX ADMIN — OXYCODONE HYDROCHLORIDE 10 MG: 5 TABLET ORAL at 08:11

## 2021-07-24 RX ADMIN — ATORVASTATIN CALCIUM 40 MG: 40 TABLET, FILM COATED ORAL at 08:11

## 2021-07-24 RX ADMIN — CLOPIDOGREL BISULFATE 75 MG: 75 TABLET ORAL at 08:11

## 2021-07-24 RX ADMIN — HYDROMORPHONE HYDROCHLORIDE 1 MG: 2 INJECTION, SOLUTION INTRAMUSCULAR; INTRAVENOUS; SUBCUTANEOUS at 08:15

## 2021-07-24 RX ADMIN — ALBUTEROL SULFATE 2.5 MG: 2.5 SOLUTION RESPIRATORY (INHALATION) at 06:26

## 2021-07-25 ENCOUNTER — READMISSION MANAGEMENT (OUTPATIENT)
Dept: CALL CENTER | Facility: HOSPITAL | Age: 76
End: 2021-07-25

## 2021-07-25 NOTE — OUTREACH NOTE
Prep Survey      Responses   [facility]?  [facility]   Is LACE score < 7 ?  No   Emergency Room discharge w/ pulse ox?  No   Eligibility  Readm Mgmt   Discharge diagnosis  Sepsis   Does the patient have one of the following disease processes/diagnoses(primary or secondary)?  Sepsis   Does the patient have Home health ordered?  Yes   What is the Home health agency?   [facility]   Is there a DME ordered?  No   Prep survey completed?  Yes          Lidia Medina RN

## 2021-07-26 ENCOUNTER — TELEPHONE (OUTPATIENT)
Dept: PAIN MEDICINE | Facility: CLINIC | Age: 76
End: 2021-07-26

## 2021-07-26 DIAGNOSIS — M54.16 LUMBAR RADICULOPATHY: Primary | ICD-10-CM

## 2021-07-26 RX ORDER — OXYCODONE AND ACETAMINOPHEN 10; 325 MG/1; MG/1
1 TABLET ORAL 4 TIMES DAILY PRN
Qty: 28 TABLET | Refills: 0 | Status: ON HOLD | OUTPATIENT
Start: 2021-07-26 | End: 2021-08-06

## 2021-07-26 NOTE — TELEPHONE ENCOUNTER
Provider: DR. WARREN  Caller: LONI SIDHU  Relationship to Patient: WIFE  Phone Number: [phone]  Reason for Call: PATIENT WENT TO ER FOR AN OPEN WOUND (MRSA) INFECTION AROUND HIS BELLY BUTTON. PATIENT IS UNABLE TO COME IN FOR APPOINTMENT UNTIL HE MAKES HEALING PROGRESS. THEY'RE CONCERNED WITH ONLY HAVING A WEEK'S SUPPLY OF PERCOCET  MG. PLEASE ADVISE. THEY WOULD LIKE A CALLBACK -[phone] IF POSSIBLE. LEAVE VM IF NO ANSWER.

## 2021-07-26 NOTE — TELEPHONE ENCOUNTER
MRS SIDHU CALLED BACK SAID SHE MISSED A CALL FROM THE OFFICE AND WAS CALLING BACK TO SEE IF IT WAS REGARDING PATIENTS MEDICATIONS, TRIED TO WARM TRANSFER TO CLINICAL AND NON CLINICAL BUT NO ANSWER, PATIENT SAID IF NEEDED SHE CAN BE CALLED BACK.

## 2021-07-26 NOTE — TELEPHONE ENCOUNTER
1 week prescription sent for Percocet  mg QID PRN - 28 tablets.       INSPECT checked and reviewed - consistent.     Please advise patient to make an appointment with Dr. Oconnor.       Larry Dinh DO  Pain Management   [facility]

## 2021-07-26 NOTE — TELEPHONE ENCOUNTER
Caller: LONI     Relationship: WIFE    Best call back number:[phone]    Medication needed: OXYCODONE 10/325  Requested Prescriptions      No prescriptions requested or ordered in this encounter       When do you need the refill by: 7/26/21    What additional details did the patient provide when requesting the medication: PATIENT JUST GOT OUT OF THE HOSPITAL ( INFECTION AND MRSA ON STOMACH )    Does the patient have less than a 3 day supply:  [x] Yes  [] No    What is the patient's preferred pharmacy:  [phone]

## 2021-07-27 ENCOUNTER — HOSPITAL ENCOUNTER (INPATIENT)
Facility: HOSPITAL | Age: 76
LOS: 8 days | Discharge: HOME HEALTH | End: 2021-08-06
Attending: INTERNAL MEDICINE | Admitting: SURGERY

## 2021-07-27 DIAGNOSIS — L03.311 CELLULITIS OF ABDOMINAL WALL: Primary | ICD-10-CM

## 2021-07-27 DIAGNOSIS — L02.211 ABDOMINAL WALL ABSCESS: ICD-10-CM

## 2021-07-27 DIAGNOSIS — M54.16 LUMBAR RADICULOPATHY: ICD-10-CM

## 2021-07-27 LAB
ALBUMIN SERPL-MCNC: 2.8 G/DL (ref 3.5–5.2)
ALBUMIN/GLOB SERPL: 0.7 G/DL
ALP SERPL-CCNC: 170 U/L (ref 39–117)
ALT SERPL W P-5'-P-CCNC: 16 U/L (ref 1–41)
ANION GAP SERPL CALCULATED.3IONS-SCNC: 10 MMOL/L (ref 5–15)
AST SERPL-CCNC: 26 U/L (ref 1–40)
BASOPHILS # BLD AUTO: 0.1 10*3/MM3 (ref 0–0.2)
BASOPHILS NFR BLD AUTO: 0.9 % (ref 0–1.5)
BILIRUB SERPL-MCNC: 0.4 MG/DL (ref 0–1.2)
BUN SERPL-MCNC: 11 MG/DL (ref 8–23)
BUN/CREAT SERPL: 10.8 (ref 7–25)
CALCIUM SPEC-SCNC: 9 MG/DL (ref 8.6–10.5)
CHLORIDE SERPL-SCNC: 95 MMOL/L (ref 98–107)
CK SERPL-CCNC: 19 U/L (ref 20–200)
CO2 SERPL-SCNC: 29 MMOL/L (ref 22–29)
CREAT SERPL-MCNC: 1.02 MG/DL (ref 0.76–1.27)
DEPRECATED RDW RBC AUTO: 48.6 FL (ref 37–54)
EOSINOPHIL # BLD AUTO: 0.4 10*3/MM3 (ref 0–0.4)
EOSINOPHIL NFR BLD AUTO: 3.4 % (ref 0.3–6.2)
ERYTHROCYTE [DISTWIDTH] IN BLOOD BY AUTOMATED COUNT: 16.8 % (ref 12.3–15.4)
GFR SERPL CREATININE-BSD FRML MDRD: 71 ML/MIN/1.73
GLOBULIN UR ELPH-MCNC: 3.9 GM/DL
GLUCOSE SERPL-MCNC: 127 MG/DL (ref 65–99)
HCT VFR BLD AUTO: 35 % (ref 37.5–51)
HGB BLD-MCNC: 11.7 G/DL (ref 13–17.7)
LYMPHOCYTES # BLD AUTO: 1.2 10*3/MM3 (ref 0.7–3.1)
LYMPHOCYTES NFR BLD AUTO: 11 % (ref 19.6–45.3)
MAGNESIUM SERPL-MCNC: 1.8 MG/DL (ref 1.6–2.4)
MCH RBC QN AUTO: 28.3 PG (ref 26.6–33)
MCHC RBC AUTO-ENTMCNC: 33.4 G/DL (ref 31.5–35.7)
MCV RBC AUTO: 84.7 FL (ref 79–97)
MONOCYTES # BLD AUTO: 0.6 10*3/MM3 (ref 0.1–0.9)
MONOCYTES NFR BLD AUTO: 5.7 % (ref 5–12)
NEUTROPHILS NFR BLD AUTO: 79 % (ref 42.7–76)
NEUTROPHILS NFR BLD AUTO: 8.6 10*3/MM3 (ref 1.7–7)
NRBC BLD AUTO-RTO: 0.1 /100 WBC (ref 0–0.2)
PLATELET # BLD AUTO: 542 10*3/MM3 (ref 140–450)
PMV BLD AUTO: 7.4 FL (ref 6–12)
POTASSIUM SERPL-SCNC: 4.5 MMOL/L (ref 3.5–5.2)
PROT SERPL-MCNC: 6.7 G/DL (ref 6–8.5)
RBC # BLD AUTO: 4.13 10*6/MM3 (ref 4.14–5.8)
SODIUM SERPL-SCNC: 134 MMOL/L (ref 136–145)
WBC # BLD AUTO: 10.8 10*3/MM3 (ref 3.4–10.8)

## 2021-07-27 PROCEDURE — G0378 HOSPITAL OBSERVATION PER HR: HCPCS

## 2021-07-27 PROCEDURE — 82550 ASSAY OF CK (CPK): CPT | Performed by: NURSE PRACTITIONER

## 2021-07-27 PROCEDURE — 25010000002 HYDROMORPHONE PER 4 MG: Performed by: FAMILY MEDICINE

## 2021-07-27 PROCEDURE — 87077 CULTURE AEROBIC IDENTIFY: CPT | Performed by: NURSE PRACTITIONER

## 2021-07-27 PROCEDURE — 80053 COMPREHEN METABOLIC PANEL: CPT | Performed by: NURSE PRACTITIONER

## 2021-07-27 PROCEDURE — 94799 UNLISTED PULMONARY SVC/PX: CPT

## 2021-07-27 PROCEDURE — 87186 SC STD MICRODIL/AGAR DIL: CPT | Performed by: NURSE PRACTITIONER

## 2021-07-27 PROCEDURE — 87205 SMEAR GRAM STAIN: CPT | Performed by: NURSE PRACTITIONER

## 2021-07-27 PROCEDURE — 87070 CULTURE OTHR SPECIMN AEROBIC: CPT | Performed by: NURSE PRACTITIONER

## 2021-07-27 PROCEDURE — 85025 COMPLETE CBC W/AUTO DIFF WBC: CPT | Performed by: NURSE PRACTITIONER

## 2021-07-27 PROCEDURE — 25010000002 VANCOMYCIN 10 G RECONSTITUTED SOLUTION: Performed by: FAMILY MEDICINE

## 2021-07-27 PROCEDURE — 99284 EMERGENCY DEPT VISIT MOD MDM: CPT

## 2021-07-27 PROCEDURE — 87040 BLOOD CULTURE FOR BACTERIA: CPT | Performed by: NURSE PRACTITIONER

## 2021-07-27 PROCEDURE — 83735 ASSAY OF MAGNESIUM: CPT | Performed by: NURSE PRACTITIONER

## 2021-07-27 PROCEDURE — 94640 AIRWAY INHALATION TREATMENT: CPT

## 2021-07-27 RX ORDER — OXYCODONE HYDROCHLORIDE 5 MG/1
10 TABLET ORAL ONCE
Status: COMPLETED | OUTPATIENT
Start: 2021-07-27 | End: 2021-07-27

## 2021-07-27 RX ORDER — ROPINIROLE 0.25 MG/1
0.5 TABLET, FILM COATED ORAL NIGHTLY
Status: DISCONTINUED | OUTPATIENT
Start: 2021-07-27 | End: 2021-08-06 | Stop reason: HOSPADM

## 2021-07-27 RX ORDER — BUDESONIDE 0.5 MG/2ML
0.5 INHALANT ORAL
Status: DISCONTINUED | OUTPATIENT
Start: 2021-07-27 | End: 2021-08-06 | Stop reason: HOSPADM

## 2021-07-27 RX ORDER — OXYCODONE HYDROCHLORIDE 5 MG/1
10 TABLET ORAL EVERY 4 HOURS PRN
Refills: 0 | Status: DISCONTINUED | OUTPATIENT
Start: 2021-07-27 | End: 2021-08-01 | Stop reason: SDUPTHER

## 2021-07-27 RX ORDER — IPRATROPIUM BROMIDE AND ALBUTEROL SULFATE 2.5; .5 MG/3ML; MG/3ML
3 SOLUTION RESPIRATORY (INHALATION)
Status: DISCONTINUED | OUTPATIENT
Start: 2021-07-27 | End: 2021-08-06 | Stop reason: HOSPADM

## 2021-07-27 RX ORDER — HYDROMORPHONE HCL 110MG/55ML
0.5 PATIENT CONTROLLED ANALGESIA SYRINGE INTRAVENOUS
Status: DISCONTINUED | OUTPATIENT
Start: 2021-07-27 | End: 2021-07-27

## 2021-07-27 RX ORDER — LOSARTAN POTASSIUM 50 MG/1
50 TABLET ORAL EVERY MORNING
Status: DISCONTINUED | OUTPATIENT
Start: 2021-07-28 | End: 2021-08-06 | Stop reason: HOSPADM

## 2021-07-27 RX ORDER — SODIUM CHLORIDE 0.9 % (FLUSH) 0.9 %
10 SYRINGE (ML) INJECTION AS NEEDED
Status: DISCONTINUED | OUTPATIENT
Start: 2021-07-27 | End: 2021-08-06 | Stop reason: HOSPADM

## 2021-07-27 RX ORDER — ATORVASTATIN CALCIUM 40 MG/1
40 TABLET, FILM COATED ORAL EVERY MORNING
Status: DISCONTINUED | OUTPATIENT
Start: 2021-07-28 | End: 2021-08-06 | Stop reason: HOSPADM

## 2021-07-27 RX ORDER — TRAZODONE HYDROCHLORIDE 100 MG/1
100 TABLET ORAL NIGHTLY
Status: DISCONTINUED | OUTPATIENT
Start: 2021-07-27 | End: 2021-08-06 | Stop reason: HOSPADM

## 2021-07-27 RX ORDER — HYDROMORPHONE HCL 110MG/55ML
0.5 PATIENT CONTROLLED ANALGESIA SYRINGE INTRAVENOUS EVERY 4 HOURS PRN
Status: DISCONTINUED | OUTPATIENT
Start: 2021-07-27 | End: 2021-08-01 | Stop reason: SDUPTHER

## 2021-07-27 RX ORDER — FUROSEMIDE 20 MG/1
20 TABLET ORAL EVERY MORNING
Status: DISCONTINUED | OUTPATIENT
Start: 2021-07-28 | End: 2021-08-06 | Stop reason: HOSPADM

## 2021-07-27 RX ORDER — ASPIRIN 81 MG/1
81 TABLET ORAL DAILY
Status: DISCONTINUED | OUTPATIENT
Start: 2021-07-27 | End: 2021-08-06 | Stop reason: HOSPADM

## 2021-07-27 RX ORDER — MIRTAZAPINE 15 MG/1
30 TABLET, FILM COATED ORAL NIGHTLY
Status: DISCONTINUED | OUTPATIENT
Start: 2021-07-27 | End: 2021-08-06 | Stop reason: HOSPADM

## 2021-07-27 RX ORDER — OXYCODONE HYDROCHLORIDE 5 MG/1
10 TABLET ORAL EVERY 4 HOURS PRN
Status: DISCONTINUED | OUTPATIENT
Start: 2021-07-27 | End: 2021-07-29 | Stop reason: SDUPTHER

## 2021-07-27 RX ORDER — PREGABALIN 75 MG/1
150 CAPSULE ORAL 2 TIMES DAILY
Status: DISCONTINUED | OUTPATIENT
Start: 2021-07-27 | End: 2021-08-06 | Stop reason: HOSPADM

## 2021-07-27 RX ORDER — POTASSIUM CHLORIDE 750 MG/1
10 TABLET, FILM COATED, EXTENDED RELEASE ORAL
Status: DISCONTINUED | OUTPATIENT
Start: 2021-07-27 | End: 2021-08-06 | Stop reason: HOSPADM

## 2021-07-27 RX ORDER — CLOPIDOGREL BISULFATE 75 MG/1
75 TABLET ORAL EVERY MORNING
Status: DISCONTINUED | OUTPATIENT
Start: 2021-07-28 | End: 2021-08-06 | Stop reason: HOSPADM

## 2021-07-27 RX ADMIN — BUDESONIDE 0.5 MG: 0.5 SUSPENSION RESPIRATORY (INHALATION) at 20:00

## 2021-07-27 RX ADMIN — ROPINIROLE HYDROCHLORIDE 0.5 MG: 0.25 TABLET, FILM COATED ORAL at 21:50

## 2021-07-27 RX ADMIN — TRAZODONE HYDROCHLORIDE 100 MG: 100 TABLET ORAL at 21:50

## 2021-07-27 RX ADMIN — OXYCODONE HYDROCHLORIDE 10 MG: 5 TABLET ORAL at 13:36

## 2021-07-27 RX ADMIN — HYDROMORPHONE HYDROCHLORIDE 0.5 MG: 2 INJECTION, SOLUTION INTRAMUSCULAR; INTRAVENOUS; SUBCUTANEOUS at 23:32

## 2021-07-27 RX ADMIN — OXYCODONE 10 MG: 5 TABLET ORAL at 17:21

## 2021-07-27 RX ADMIN — MIRTAZAPINE 30 MG: 15 TABLET, FILM COATED ORAL at 21:50

## 2021-07-27 RX ADMIN — PREGABALIN 150 MG: 75 CAPSULE ORAL at 21:50

## 2021-07-27 RX ADMIN — POTASSIUM CHLORIDE 10 MEQ: 750 TABLET, EXTENDED RELEASE ORAL at 21:50

## 2021-07-27 RX ADMIN — APIXABAN 5 MG: 5 TABLET, FILM COATED ORAL at 21:50

## 2021-07-27 RX ADMIN — IPRATROPIUM BROMIDE AND ALBUTEROL SULFATE 3 ML: 2.5; .5 SOLUTION RESPIRATORY (INHALATION) at 20:00

## 2021-07-27 RX ADMIN — ASPIRIN 81 MG: 81 TABLET, COATED ORAL at 21:49

## 2021-07-27 RX ADMIN — VANCOMYCIN HYDROCHLORIDE 1250 MG: 10 INJECTION, POWDER, LYOPHILIZED, FOR SOLUTION INTRAVENOUS at 21:49

## 2021-07-27 RX ADMIN — METOPROLOL TARTRATE 25 MG: 25 TABLET, FILM COATED ORAL at 21:50

## 2021-07-27 RX ADMIN — Medication 10 ML: at 21:51

## 2021-07-27 RX ADMIN — HYDROMORPHONE HYDROCHLORIDE 0.5 MG: 2 INJECTION, SOLUTION INTRAMUSCULAR; INTRAVENOUS; SUBCUTANEOUS at 19:30

## 2021-07-28 ENCOUNTER — ANESTHESIA (OUTPATIENT)
Dept: PERIOP | Facility: HOSPITAL | Age: 76
End: 2021-07-28

## 2021-07-28 ENCOUNTER — ANESTHESIA EVENT (OUTPATIENT)
Dept: PERIOP | Facility: HOSPITAL | Age: 76
End: 2021-07-28

## 2021-07-28 ENCOUNTER — READMISSION MANAGEMENT (OUTPATIENT)
Dept: CALL CENTER | Facility: HOSPITAL | Age: 76
End: 2021-07-28

## 2021-07-28 LAB
ANION GAP SERPL CALCULATED.3IONS-SCNC: 8 MMOL/L (ref 5–15)
BASOPHILS # BLD AUTO: 0.1 10*3/MM3 (ref 0–0.2)
BASOPHILS NFR BLD AUTO: 1 % (ref 0–1.5)
BUN SERPL-MCNC: 11 MG/DL (ref 8–23)
BUN/CREAT SERPL: 11.8 (ref 7–25)
CALCIUM SPEC-SCNC: 8.4 MG/DL (ref 8.6–10.5)
CHLORIDE SERPL-SCNC: 97 MMOL/L (ref 98–107)
CO2 SERPL-SCNC: 29 MMOL/L (ref 22–29)
CREAT SERPL-MCNC: 0.93 MG/DL (ref 0.76–1.27)
DEPRECATED RDW RBC AUTO: 46.4 FL (ref 37–54)
EOSINOPHIL # BLD AUTO: 0.3 10*3/MM3 (ref 0–0.4)
EOSINOPHIL NFR BLD AUTO: 3 % (ref 0.3–6.2)
ERYTHROCYTE [DISTWIDTH] IN BLOOD BY AUTOMATED COUNT: 16.3 % (ref 12.3–15.4)
GFR SERPL CREATININE-BSD FRML MDRD: 79 ML/MIN/1.73
GLUCOSE BLDC GLUCOMTR-MCNC: 185 MG/DL (ref 70–105)
GLUCOSE BLDC GLUCOMTR-MCNC: 200 MG/DL (ref 70–105)
GLUCOSE SERPL-MCNC: 145 MG/DL (ref 65–99)
HCT VFR BLD AUTO: 32.9 % (ref 37.5–51)
HGB BLD-MCNC: 11 G/DL (ref 13–17.7)
LYMPHOCYTES # BLD AUTO: 0.8 10*3/MM3 (ref 0.7–3.1)
LYMPHOCYTES NFR BLD AUTO: 9.7 % (ref 19.6–45.3)
MCH RBC QN AUTO: 27.1 PG (ref 26.6–33)
MCHC RBC AUTO-ENTMCNC: 33.3 G/DL (ref 31.5–35.7)
MCV RBC AUTO: 81.3 FL (ref 79–97)
MONOCYTES # BLD AUTO: 0.5 10*3/MM3 (ref 0.1–0.9)
MONOCYTES NFR BLD AUTO: 6.2 % (ref 5–12)
NEUTROPHILS NFR BLD AUTO: 6.6 10*3/MM3 (ref 1.7–7)
NEUTROPHILS NFR BLD AUTO: 80.1 % (ref 42.7–76)
NRBC BLD AUTO-RTO: 0 /100 WBC (ref 0–0.2)
PLATELET # BLD AUTO: 476 10*3/MM3 (ref 140–450)
PMV BLD AUTO: 7.1 FL (ref 6–12)
POTASSIUM SERPL-SCNC: 4.2 MMOL/L (ref 3.5–5.2)
RBC # BLD AUTO: 4.05 10*6/MM3 (ref 4.14–5.8)
SARS-COV-2 ORF1AB RESP QL NAA+PROBE: NOT DETECTED
SODIUM SERPL-SCNC: 134 MMOL/L (ref 136–145)
VANCOMYCIN SERPL-MCNC: 17.5 MCG/ML (ref 5–40)
WBC # BLD AUTO: 8.3 10*3/MM3 (ref 3.4–10.8)

## 2021-07-28 PROCEDURE — 80202 ASSAY OF VANCOMYCIN: CPT | Performed by: FAMILY MEDICINE

## 2021-07-28 PROCEDURE — 25010000002 HYDROMORPHONE PER 4 MG: Performed by: FAMILY MEDICINE

## 2021-07-28 PROCEDURE — 25010000002 VANCOMYCIN 10 G RECONSTITUTED SOLUTION: Performed by: FAMILY MEDICINE

## 2021-07-28 PROCEDURE — U0004 COV-19 TEST NON-CDC HGH THRU: HCPCS | Performed by: FAMILY MEDICINE

## 2021-07-28 PROCEDURE — 85025 COMPLETE CBC W/AUTO DIFF WBC: CPT | Performed by: FAMILY MEDICINE

## 2021-07-28 PROCEDURE — 80048 BASIC METABOLIC PNL TOTAL CA: CPT | Performed by: FAMILY MEDICINE

## 2021-07-28 PROCEDURE — 82962 GLUCOSE BLOOD TEST: CPT

## 2021-07-28 PROCEDURE — 94799 UNLISTED PULMONARY SVC/PX: CPT

## 2021-07-28 PROCEDURE — 99214 OFFICE O/P EST MOD 30 MIN: CPT | Performed by: SURGERY

## 2021-07-28 PROCEDURE — 25010000002 VANCOMYCIN 1 G RECONSTITUTED SOLUTION 1 EACH VIAL: Performed by: FAMILY MEDICINE

## 2021-07-28 PROCEDURE — G0378 HOSPITAL OBSERVATION PER HR: HCPCS

## 2021-07-28 PROCEDURE — 83036 HEMOGLOBIN GLYCOSYLATED A1C: CPT | Performed by: INTERNAL MEDICINE

## 2021-07-28 RX ORDER — INSULIN LISPRO 100 [IU]/ML
0-7 INJECTION, SOLUTION INTRAVENOUS; SUBCUTANEOUS
Status: DISCONTINUED | OUTPATIENT
Start: 2021-07-28 | End: 2021-08-06 | Stop reason: HOSPADM

## 2021-07-28 RX ORDER — DEXTROSE MONOHYDRATE 25 G/50ML
25 INJECTION, SOLUTION INTRAVENOUS
Status: DISCONTINUED | OUTPATIENT
Start: 2021-07-28 | End: 2021-08-06 | Stop reason: HOSPADM

## 2021-07-28 RX ORDER — NICOTINE POLACRILEX 4 MG
15 LOZENGE BUCCAL
Status: DISCONTINUED | OUTPATIENT
Start: 2021-07-28 | End: 2021-08-06 | Stop reason: HOSPADM

## 2021-07-28 RX ORDER — INSULIN LISPRO 100 [IU]/ML
0-7 INJECTION, SOLUTION INTRAVENOUS; SUBCUTANEOUS AS NEEDED
Status: DISCONTINUED | OUTPATIENT
Start: 2021-07-28 | End: 2021-08-06 | Stop reason: HOSPADM

## 2021-07-28 RX ADMIN — METOPROLOL TARTRATE 25 MG: 25 TABLET, FILM COATED ORAL at 20:58

## 2021-07-28 RX ADMIN — METOPROLOL TARTRATE 25 MG: 25 TABLET, FILM COATED ORAL at 08:42

## 2021-07-28 RX ADMIN — IPRATROPIUM BROMIDE AND ALBUTEROL SULFATE 3 ML: 2.5; .5 SOLUTION RESPIRATORY (INHALATION) at 00:14

## 2021-07-28 RX ADMIN — ATORVASTATIN CALCIUM 40 MG: 40 TABLET, FILM COATED ORAL at 06:21

## 2021-07-28 RX ADMIN — FUROSEMIDE 20 MG: 20 TABLET ORAL at 06:21

## 2021-07-28 RX ADMIN — ASPIRIN 81 MG: 81 TABLET, COATED ORAL at 08:42

## 2021-07-28 RX ADMIN — BUDESONIDE 0.5 MG: 0.5 SUSPENSION RESPIRATORY (INHALATION) at 19:24

## 2021-07-28 RX ADMIN — HYDROMORPHONE HYDROCHLORIDE 0.5 MG: 2 INJECTION, SOLUTION INTRAMUSCULAR; INTRAVENOUS; SUBCUTANEOUS at 12:01

## 2021-07-28 RX ADMIN — HYDROMORPHONE HYDROCHLORIDE 0.5 MG: 2 INJECTION, SOLUTION INTRAMUSCULAR; INTRAVENOUS; SUBCUTANEOUS at 23:10

## 2021-07-28 RX ADMIN — LOSARTAN POTASSIUM 50 MG: 50 TABLET, FILM COATED ORAL at 06:21

## 2021-07-28 RX ADMIN — VANCOMYCIN HYDROCHLORIDE 1000 MG: 1 INJECTION, POWDER, LYOPHILIZED, FOR SOLUTION INTRAVENOUS at 20:17

## 2021-07-28 RX ADMIN — IPRATROPIUM BROMIDE AND ALBUTEROL SULFATE 3 ML: 2.5; .5 SOLUTION RESPIRATORY (INHALATION) at 19:16

## 2021-07-28 RX ADMIN — CLOPIDOGREL BISULFATE 75 MG: 75 TABLET ORAL at 06:21

## 2021-07-28 RX ADMIN — Medication 10 ML: at 08:43

## 2021-07-28 RX ADMIN — POTASSIUM CHLORIDE 10 MEQ: 750 TABLET, EXTENDED RELEASE ORAL at 08:42

## 2021-07-28 RX ADMIN — APIXABAN 5 MG: 5 TABLET, FILM COATED ORAL at 08:42

## 2021-07-28 RX ADMIN — HYDROMORPHONE HYDROCHLORIDE 0.5 MG: 2 INJECTION, SOLUTION INTRAMUSCULAR; INTRAVENOUS; SUBCUTANEOUS at 03:46

## 2021-07-28 RX ADMIN — HYDROMORPHONE HYDROCHLORIDE 0.5 MG: 2 INJECTION, SOLUTION INTRAMUSCULAR; INTRAVENOUS; SUBCUTANEOUS at 16:03

## 2021-07-28 RX ADMIN — PREGABALIN 150 MG: 75 CAPSULE ORAL at 08:42

## 2021-07-28 RX ADMIN — BUDESONIDE 0.5 MG: 0.5 SUSPENSION RESPIRATORY (INHALATION) at 08:57

## 2021-07-28 RX ADMIN — VANCOMYCIN HYDROCHLORIDE 1250 MG: 10 INJECTION, POWDER, LYOPHILIZED, FOR SOLUTION INTRAVENOUS at 08:43

## 2021-07-28 RX ADMIN — HYDROMORPHONE HYDROCHLORIDE 0.5 MG: 2 INJECTION, SOLUTION INTRAMUSCULAR; INTRAVENOUS; SUBCUTANEOUS at 07:52

## 2021-07-28 RX ADMIN — HYDROMORPHONE HYDROCHLORIDE 0.5 MG: 2 INJECTION, SOLUTION INTRAMUSCULAR; INTRAVENOUS; SUBCUTANEOUS at 19:59

## 2021-07-28 RX ADMIN — PREGABALIN 150 MG: 75 CAPSULE ORAL at 20:58

## 2021-07-28 RX ADMIN — IPRATROPIUM BROMIDE AND ALBUTEROL SULFATE 3 ML: 2.5; .5 SOLUTION RESPIRATORY (INHALATION) at 23:32

## 2021-07-28 RX ADMIN — IPRATROPIUM BROMIDE AND ALBUTEROL SULFATE 3 ML: 2.5; .5 SOLUTION RESPIRATORY (INHALATION) at 15:43

## 2021-07-28 RX ADMIN — ROPINIROLE HYDROCHLORIDE 0.5 MG: 0.25 TABLET, FILM COATED ORAL at 20:58

## 2021-07-28 RX ADMIN — TRAZODONE HYDROCHLORIDE 100 MG: 100 TABLET ORAL at 20:58

## 2021-07-28 RX ADMIN — IPRATROPIUM BROMIDE AND ALBUTEROL SULFATE 3 ML: 2.5; .5 SOLUTION RESPIRATORY (INHALATION) at 08:51

## 2021-07-28 RX ADMIN — IPRATROPIUM BROMIDE AND ALBUTEROL SULFATE 3 ML: 2.5; .5 SOLUTION RESPIRATORY (INHALATION) at 12:13

## 2021-07-28 RX ADMIN — POTASSIUM CHLORIDE 10 MEQ: 750 TABLET, EXTENDED RELEASE ORAL at 12:02

## 2021-07-28 RX ADMIN — OXYCODONE 10 MG: 5 TABLET ORAL at 02:38

## 2021-07-28 RX ADMIN — MIRTAZAPINE 30 MG: 15 TABLET, FILM COATED ORAL at 20:58

## 2021-07-28 NOTE — ANESTHESIA PREPROCEDURE EVALUATION
Anesthesia Evaluation     Patient summary reviewed and Nursing notes reviewed   NPO Solid Status: > 6 hours  NPO Liquid Status: > 6 hours           Airway   Mallampati: II  TM distance: >3 FB  Neck ROM: full  No difficulty expected  Dental - normal exam     Pulmonary - normal exam    breath sounds clear to auscultation  (+) COPD, shortness of breath, sleep apnea,   Cardiovascular - normal exam  Exercise tolerance: unable to assess    ECG reviewed  Rhythm: regular  Rate: normal    (+) hypertension, CAD, cardiac stents more than 12 months ago dysrhythmias, angina with exertion, CHF Diastolic >=55%, PVD,     ROS comment: Procedure  Anesthesia was provided by anesthesiologist with intravenous Diprivan.  EMBER probe could be passed without difficulty.  Patient tolerated the procedure well.  No complications were noted.     Results  Technically satisfactory study.  Mitral valve is structurally normal.  Significant mitral regurgitation is present with a central jet with extending to the lateral wall into the superior aspect of the left atrium.  Tricuspid valve is normal.  Mild tricuspid regurgitation is present.  Aortic valve is tricuspid and is normal.  No evidence for valvular vegetations is present.  Left atrium is enlarged.  Left atrial appendage is enlarged.  No evidence for intracardiac clot is present.  Right atrium is normal in size.  Right ventricle is normal in size.  Atrial septum is intact.  Normal size left ventricle and normal contractility with ejection fraction of 60%.  No pericardial effusion is present.  Aorta is normal.     Impression  No evidence for valvular vegetations is present.  Significant mitral regurgitation with central jet extending to the lateral wall and into the superior aspect of the left atrium.  Mild tricuspid regurgitation.  Calculated pulmonary artery pressure is 25 mmHg.  Left atrial and left atrial appendage enlargement is present without clot.  Normal left ventricle size and  contractility with ejection fraction of 60%.  Patient Hx Of Height, Weight, and Vitals    Height Weight BSA (Calculated - sq m) BMI (kg/m2) Pulse BP      70 130/85  Clinical Indication    Endocarditis  Cardiac History    Diagnosis Date Comment Source  Benign hypertension with CKD (chronic kidney disease) stage III (CMS/[facility]) 10/26/2020    CHF (congestive heart failure) (CMS/HCC)     Hypertension     Peripheral vascular disease due to secondary diabetes (CMS/HCC) 10/26/2020    Type 2 diabetes, controlled, with neuropathy (CMS/HCC) 10/26/2020 no meds   Type 2 diabetes, controlled, with neuropathy (CMS/HCC) 10/26/2020    Study Description    A two-dimensional transesophageal echocardiogram was performed with complete Doppler and color. RN complete     Informed consent for Transesophageal Echocardiogram, and use of contrast as needed, was obtained obtained prior to the procedure. The procedure was performed OPCV. Patient was noted to be in a fasting state, with a peripheral IV in place.   A bite block was placed for probe protection. A oropharyngeal topical anesthetic was not applied during the procedure.  Anesthesia monitored sedation was administered by Jose Interiano MD.  Please see Anesthesia notes for documentation. Conscious sedation was utilized with the following medication(s) being administered during the procedure: 140 mg of Propofol. A multi-frequency, multiplane transesophageal echocardiographic endoscope was inserted and manipulated in the standard fashion to achieve multiplane views.  Transesophageal probe was able to be passed without difficulty. Usual basal, mid-esophageal, transgastric, and aortic views were obtained. The patient's vital signs, including blood pressure, heart rate, pulse oximetry, and cardiac rhythm were monitored throughout the procedure. Vitals signs remained stable throughout the study. Paced rhythm was observed for the duration of the procedure. Post-procedural recovery was  completed at bedside, The patient tolerated the procedure without evidence of oropharyngeal or esophageal trauma.  Tricuspid         Neuro/Psych- negative ROS  GI/Hepatic/Renal/Endo    (+) morbid obesity, hiatal hernia, GERD,  liver disease, renal disease CRI, diabetes mellitus,     Musculoskeletal     Abdominal  - normal exam   Substance History - negative use     OB/GYN negative ob/gyn ROS         Other   arthritis,                    Anesthesia Plan    ASA 4 - emergent     general   (RSI, [id number])  intravenous induction     Anesthetic plan, all risks, benefits, and alternatives have been provided, discussed and informed consent has been obtained with: patient.

## 2021-07-28 NOTE — OUTREACH NOTE
Sepsis Week 1 Survey      Responses   [facility] patient discharged from?  [facility]   Does the patient have one of the following disease processes/diagnoses(primary or secondary)?  Sepsis   Week 1 attempt successful?  No   Revoke  Readmitted          Iqra Bishop RN

## 2021-07-29 LAB
ANION GAP SERPL CALCULATED.3IONS-SCNC: 10 MMOL/L (ref 5–15)
BASOPHILS # BLD AUTO: 0.1 10*3/MM3 (ref 0–0.2)
BASOPHILS NFR BLD AUTO: 0.6 % (ref 0–1.5)
BUN SERPL-MCNC: 11 MG/DL (ref 8–23)
BUN/CREAT SERPL: 13.1 (ref 7–25)
CALCIUM SPEC-SCNC: 8 MG/DL (ref 8.6–10.5)
CHLORIDE SERPL-SCNC: 99 MMOL/L (ref 98–107)
CO2 SERPL-SCNC: 25 MMOL/L (ref 22–29)
CREAT SERPL-MCNC: 0.84 MG/DL (ref 0.76–1.27)
DEPRECATED RDW RBC AUTO: 49 FL (ref 37–54)
EOSINOPHIL # BLD AUTO: 0.3 10*3/MM3 (ref 0–0.4)
EOSINOPHIL NFR BLD AUTO: 3 % (ref 0.3–6.2)
ERYTHROCYTE [DISTWIDTH] IN BLOOD BY AUTOMATED COUNT: 17.2 % (ref 12.3–15.4)
GFR SERPL CREATININE-BSD FRML MDRD: 89 ML/MIN/1.73
GLUCOSE BLDC GLUCOMTR-MCNC: 162 MG/DL (ref 70–105)
GLUCOSE BLDC GLUCOMTR-MCNC: 169 MG/DL (ref 70–105)
GLUCOSE BLDC GLUCOMTR-MCNC: 176 MG/DL (ref 70–105)
GLUCOSE BLDC GLUCOMTR-MCNC: 229 MG/DL (ref 70–105)
GLUCOSE SERPL-MCNC: 158 MG/DL (ref 65–99)
HBA1C MFR BLD: 9 % (ref 3.5–5.6)
HCT VFR BLD AUTO: 33 % (ref 37.5–51)
HGB BLD-MCNC: 11.1 G/DL (ref 13–17.7)
LYMPHOCYTES # BLD AUTO: 1 10*3/MM3 (ref 0.7–3.1)
LYMPHOCYTES NFR BLD AUTO: 10.5 % (ref 19.6–45.3)
MCH RBC QN AUTO: 27 PG (ref 26.6–33)
MCHC RBC AUTO-ENTMCNC: 33.5 G/DL (ref 31.5–35.7)
MCV RBC AUTO: 80.7 FL (ref 79–97)
MONOCYTES # BLD AUTO: 0.6 10*3/MM3 (ref 0.1–0.9)
MONOCYTES NFR BLD AUTO: 6.5 % (ref 5–12)
NEUTROPHILS NFR BLD AUTO: 7.3 10*3/MM3 (ref 1.7–7)
NEUTROPHILS NFR BLD AUTO: 79.4 % (ref 42.7–76)
NRBC BLD AUTO-RTO: 0.3 /100 WBC (ref 0–0.2)
PLATELET # BLD AUTO: 441 10*3/MM3 (ref 140–450)
PMV BLD AUTO: 7.1 FL (ref 6–12)
POTASSIUM SERPL-SCNC: 4.1 MMOL/L (ref 3.5–5.2)
RBC # BLD AUTO: 4.09 10*6/MM3 (ref 4.14–5.8)
SODIUM SERPL-SCNC: 134 MMOL/L (ref 136–145)
WBC # BLD AUTO: 9.2 10*3/MM3 (ref 3.4–10.8)

## 2021-07-29 PROCEDURE — 82962 GLUCOSE BLOOD TEST: CPT

## 2021-07-29 PROCEDURE — 87147 CULTURE TYPE IMMUNOLOGIC: CPT | Performed by: SURGERY

## 2021-07-29 PROCEDURE — 25010000002 HYDROMORPHONE PER 4 MG: Performed by: SURGERY

## 2021-07-29 PROCEDURE — 99233 SBSQ HOSP IP/OBS HIGH 50: CPT | Performed by: SURGERY

## 2021-07-29 PROCEDURE — 80048 BASIC METABOLIC PNL TOTAL CA: CPT | Performed by: FAMILY MEDICINE

## 2021-07-29 PROCEDURE — 25010000002 DEXAMETHASONE PER 1 MG: Performed by: ANESTHESIOLOGY

## 2021-07-29 PROCEDURE — 0HB7XZZ EXCISION OF ABDOMEN SKIN, EXTERNAL APPROACH: ICD-10-PCS | Performed by: SURGERY

## 2021-07-29 PROCEDURE — 87015 SPECIMEN INFECT AGNT CONCNTJ: CPT | Performed by: SURGERY

## 2021-07-29 PROCEDURE — 25010000002 FENTANYL CITRATE (PF) 100 MCG/2ML SOLUTION: Performed by: ANESTHESIOLOGY

## 2021-07-29 PROCEDURE — 94799 UNLISTED PULMONARY SVC/PX: CPT

## 2021-07-29 PROCEDURE — 11045 DBRDMT SUBQ TISS EACH ADDL: CPT | Performed by: SURGERY

## 2021-07-29 PROCEDURE — 85025 COMPLETE CBC W/AUTO DIFF WBC: CPT | Performed by: FAMILY MEDICINE

## 2021-07-29 PROCEDURE — 87075 CULTR BACTERIA EXCEPT BLOOD: CPT | Performed by: SURGERY

## 2021-07-29 PROCEDURE — 25010000002 ONDANSETRON PER 1 MG: Performed by: ANESTHESIOLOGY

## 2021-07-29 PROCEDURE — 25010000002 HYDROMORPHONE PER 4 MG: Performed by: FAMILY MEDICINE

## 2021-07-29 PROCEDURE — 25010000002 PROPOFOL 10 MG/ML EMULSION: Performed by: ANESTHESIOLOGY

## 2021-07-29 PROCEDURE — 87070 CULTURE OTHR SPECIMN AEROBIC: CPT | Performed by: SURGERY

## 2021-07-29 PROCEDURE — 25010000002 VANCOMYCIN 1 G RECONSTITUTED SOLUTION 1 EACH VIAL: Performed by: FAMILY MEDICINE

## 2021-07-29 PROCEDURE — 25010000002 VANCOMYCIN PER 500 MG: Performed by: SURGERY

## 2021-07-29 PROCEDURE — 63710000001 INSULIN LISPRO (HUMAN) PER 5 UNITS: Performed by: SURGERY

## 2021-07-29 PROCEDURE — 11042 DBRDMT SUBQ TIS 1ST 20SQCM/<: CPT | Performed by: SURGERY

## 2021-07-29 PROCEDURE — 87205 SMEAR GRAM STAIN: CPT | Performed by: SURGERY

## 2021-07-29 PROCEDURE — 25010000002 HYDROMORPHONE PER 4 MG: Performed by: ANESTHESIOLOGY

## 2021-07-29 RX ORDER — SODIUM CHLORIDE 9 MG/ML
9 INJECTION, SOLUTION INTRAVENOUS ONCE
Status: COMPLETED | OUTPATIENT
Start: 2021-07-29 | End: 2021-07-29

## 2021-07-29 RX ORDER — HYDROMORPHONE HCL 110MG/55ML
0.5 PATIENT CONTROLLED ANALGESIA SYRINGE INTRAVENOUS
Status: COMPLETED | OUTPATIENT
Start: 2021-07-29 | End: 2021-07-29

## 2021-07-29 RX ORDER — ONDANSETRON 4 MG/1
4 TABLET, FILM COATED ORAL EVERY 6 HOURS PRN
Status: DISCONTINUED | OUTPATIENT
Start: 2021-07-29 | End: 2021-08-01 | Stop reason: SDUPTHER

## 2021-07-29 RX ORDER — ONDANSETRON 2 MG/ML
4 INJECTION INTRAMUSCULAR; INTRAVENOUS ONCE AS NEEDED
Status: DISCONTINUED | OUTPATIENT
Start: 2021-07-29 | End: 2021-07-29 | Stop reason: HOSPADM

## 2021-07-29 RX ORDER — ACETAMINOPHEN 650 MG/1
650 SUPPOSITORY RECTAL EVERY 4 HOURS PRN
Status: DISCONTINUED | OUTPATIENT
Start: 2021-07-29 | End: 2021-08-01 | Stop reason: SDUPTHER

## 2021-07-29 RX ORDER — PHENYLEPHRINE HCL IN 0.9% NACL 1 MG/10 ML
SYRINGE (ML) INTRAVENOUS AS NEEDED
Status: DISCONTINUED | OUTPATIENT
Start: 2021-07-29 | End: 2021-07-30 | Stop reason: SURG

## 2021-07-29 RX ORDER — ACETAMINOPHEN 325 MG/1
650 TABLET ORAL EVERY 4 HOURS PRN
Status: DISCONTINUED | OUTPATIENT
Start: 2021-07-29 | End: 2021-08-01 | Stop reason: SDUPTHER

## 2021-07-29 RX ORDER — DEXAMETHASONE SODIUM PHOSPHATE 4 MG/ML
INJECTION, SOLUTION INTRA-ARTICULAR; INTRALESIONAL; INTRAMUSCULAR; INTRAVENOUS; SOFT TISSUE AS NEEDED
Status: DISCONTINUED | OUTPATIENT
Start: 2021-07-29 | End: 2021-07-30 | Stop reason: SURG

## 2021-07-29 RX ORDER — SODIUM CHLORIDE 9 MG/ML
INJECTION, SOLUTION INTRAVENOUS CONTINUOUS PRN
Status: DISCONTINUED | OUTPATIENT
Start: 2021-07-29 | End: 2021-07-30 | Stop reason: SURG

## 2021-07-29 RX ORDER — PROPOFOL 10 MG/ML
VIAL (ML) INTRAVENOUS AS NEEDED
Status: DISCONTINUED | OUTPATIENT
Start: 2021-07-29 | End: 2021-07-30 | Stop reason: SURG

## 2021-07-29 RX ORDER — ACETAMINOPHEN 160 MG
TABLET,DISINTEGRATING ORAL AS NEEDED
Status: DISCONTINUED | OUTPATIENT
Start: 2021-07-29 | End: 2021-07-29 | Stop reason: HOSPADM

## 2021-07-29 RX ORDER — ONDANSETRON 2 MG/ML
4 INJECTION INTRAMUSCULAR; INTRAVENOUS EVERY 6 HOURS PRN
Status: DISCONTINUED | OUTPATIENT
Start: 2021-07-29 | End: 2021-08-01 | Stop reason: SDUPTHER

## 2021-07-29 RX ORDER — ONDANSETRON 2 MG/ML
INJECTION INTRAMUSCULAR; INTRAVENOUS AS NEEDED
Status: DISCONTINUED | OUTPATIENT
Start: 2021-07-29 | End: 2021-07-30 | Stop reason: SURG

## 2021-07-29 RX ORDER — IPRATROPIUM BROMIDE AND ALBUTEROL SULFATE 2.5; .5 MG/3ML; MG/3ML
3 SOLUTION RESPIRATORY (INHALATION) ONCE AS NEEDED
Status: DISCONTINUED | OUTPATIENT
Start: 2021-07-29 | End: 2021-07-29 | Stop reason: HOSPADM

## 2021-07-29 RX ORDER — HYDROCODONE BITARTRATE AND ACETAMINOPHEN 7.5; 325 MG/1; MG/1
1 TABLET ORAL EVERY 4 HOURS PRN
Status: DISCONTINUED | OUTPATIENT
Start: 2021-07-29 | End: 2021-08-06 | Stop reason: HOSPADM

## 2021-07-29 RX ORDER — SODIUM CHLORIDE 9 MG/ML
100 INJECTION, SOLUTION INTRAVENOUS CONTINUOUS
Status: DISCONTINUED | OUTPATIENT
Start: 2021-07-29 | End: 2021-08-01 | Stop reason: SDUPTHER

## 2021-07-29 RX ORDER — PROMETHAZINE HYDROCHLORIDE 12.5 MG/1
12.5 SUPPOSITORY RECTAL EVERY 6 HOURS PRN
Status: DISCONTINUED | OUTPATIENT
Start: 2021-07-29 | End: 2021-08-06 | Stop reason: HOSPADM

## 2021-07-29 RX ORDER — DOCUSATE SODIUM 100 MG/1
100 CAPSULE, LIQUID FILLED ORAL 2 TIMES DAILY
Status: DISCONTINUED | OUTPATIENT
Start: 2021-07-29 | End: 2021-08-06 | Stop reason: HOSPADM

## 2021-07-29 RX ORDER — HYDROCODONE BITARTRATE AND ACETAMINOPHEN 5; 325 MG/1; MG/1
1 TABLET ORAL EVERY 4 HOURS PRN
Status: DISCONTINUED | OUTPATIENT
Start: 2021-07-29 | End: 2021-08-01 | Stop reason: SDUPTHER

## 2021-07-29 RX ORDER — ROCURONIUM BROMIDE 10 MG/ML
INJECTION, SOLUTION INTRAVENOUS AS NEEDED
Status: DISCONTINUED | OUTPATIENT
Start: 2021-07-29 | End: 2021-07-30 | Stop reason: SURG

## 2021-07-29 RX ORDER — FENTANYL CITRATE 50 UG/ML
INJECTION, SOLUTION INTRAMUSCULAR; INTRAVENOUS AS NEEDED
Status: DISCONTINUED | OUTPATIENT
Start: 2021-07-29 | End: 2021-07-30 | Stop reason: SURG

## 2021-07-29 RX ORDER — PROMETHAZINE HYDROCHLORIDE 12.5 MG/1
12.5 TABLET ORAL EVERY 6 HOURS PRN
Status: DISCONTINUED | OUTPATIENT
Start: 2021-07-29 | End: 2021-08-06 | Stop reason: HOSPADM

## 2021-07-29 RX ORDER — HYDROCODONE BITARTRATE AND ACETAMINOPHEN 7.5; 325 MG/1; MG/1
2 TABLET ORAL EVERY 4 HOURS PRN
Status: DISCONTINUED | OUTPATIENT
Start: 2021-07-29 | End: 2021-07-29 | Stop reason: HOSPADM

## 2021-07-29 RX ORDER — LIDOCAINE HYDROCHLORIDE 20 MG/ML
INJECTION, SOLUTION EPIDURAL; INFILTRATION; INTRACAUDAL; PERINEURAL AS NEEDED
Status: DISCONTINUED | OUTPATIENT
Start: 2021-07-29 | End: 2021-07-30 | Stop reason: SURG

## 2021-07-29 RX ORDER — METOCLOPRAMIDE HYDROCHLORIDE 5 MG/ML
10 INJECTION INTRAMUSCULAR; INTRAVENOUS EVERY 6 HOURS PRN
Status: DISCONTINUED | OUTPATIENT
Start: 2021-07-29 | End: 2021-08-06 | Stop reason: HOSPADM

## 2021-07-29 RX ADMIN — HYDROMORPHONE HYDROCHLORIDE 0.5 MG: 2 INJECTION, SOLUTION INTRAMUSCULAR; INTRAVENOUS; SUBCUTANEOUS at 13:22

## 2021-07-29 RX ADMIN — DOCUSATE SODIUM 100 MG: 100 CAPSULE, LIQUID FILLED ORAL at 20:26

## 2021-07-29 RX ADMIN — Medication 100 MCG: at 11:38

## 2021-07-29 RX ADMIN — SODIUM CHLORIDE 100 ML/HR: 9 INJECTION, SOLUTION INTRAVENOUS at 16:12

## 2021-07-29 RX ADMIN — HYDROMORPHONE HYDROCHLORIDE 0.5 MG: 2 INJECTION, SOLUTION INTRAMUSCULAR; INTRAVENOUS; SUBCUTANEOUS at 21:26

## 2021-07-29 RX ADMIN — FENTANYL CITRATE 100 MCG: 50 INJECTION, SOLUTION INTRAMUSCULAR; INTRAVENOUS at 11:26

## 2021-07-29 RX ADMIN — METOPROLOL TARTRATE 25 MG: 25 TABLET, FILM COATED ORAL at 20:25

## 2021-07-29 RX ADMIN — HYDROMORPHONE HYDROCHLORIDE 0.5 MG: 2 INJECTION, SOLUTION INTRAMUSCULAR; INTRAVENOUS; SUBCUTANEOUS at 17:13

## 2021-07-29 RX ADMIN — VANCOMYCIN HYDROCHLORIDE 1000 MG: 1 INJECTION, POWDER, LYOPHILIZED, FOR SOLUTION INTRAVENOUS at 09:16

## 2021-07-29 RX ADMIN — CEFAZOLIN SODIUM 2 G: 1 INJECTION, POWDER, FOR SOLUTION INTRAMUSCULAR; INTRAVENOUS at 11:27

## 2021-07-29 RX ADMIN — MIRTAZAPINE 30 MG: 15 TABLET, FILM COATED ORAL at 21:24

## 2021-07-29 RX ADMIN — BUDESONIDE 0.5 MG: 0.5 SUSPENSION RESPIRATORY (INHALATION) at 20:07

## 2021-07-29 RX ADMIN — HYDROCODONE BITARTRATE AND ACETAMINOPHEN 1 TABLET: 7.5; 325 TABLET ORAL at 16:08

## 2021-07-29 RX ADMIN — BUDESONIDE 0.5 MG: 0.5 SUSPENSION RESPIRATORY (INHALATION) at 06:55

## 2021-07-29 RX ADMIN — SODIUM CHLORIDE 9 ML/HR: 9 INJECTION, SOLUTION INTRAVENOUS at 10:31

## 2021-07-29 RX ADMIN — TRAZODONE HYDROCHLORIDE 100 MG: 100 TABLET ORAL at 21:24

## 2021-07-29 RX ADMIN — IPRATROPIUM BROMIDE AND ALBUTEROL SULFATE 3 ML: 2.5; .5 SOLUTION RESPIRATORY (INHALATION) at 20:07

## 2021-07-29 RX ADMIN — SUGAMMADEX 200 MG: 100 INJECTION, SOLUTION INTRAVENOUS at 12:17

## 2021-07-29 RX ADMIN — POTASSIUM CHLORIDE 10 MEQ: 750 TABLET, EXTENDED RELEASE ORAL at 17:12

## 2021-07-29 RX ADMIN — IPRATROPIUM BROMIDE AND ALBUTEROL SULFATE 3 ML: 2.5; .5 SOLUTION RESPIRATORY (INHALATION) at 06:55

## 2021-07-29 RX ADMIN — ONDANSETRON 4 MG: 2 INJECTION INTRAMUSCULAR; INTRAVENOUS at 11:48

## 2021-07-29 RX ADMIN — HYDROMORPHONE HYDROCHLORIDE 0.5 MG: 2 INJECTION, SOLUTION INTRAMUSCULAR; INTRAVENOUS; SUBCUTANEOUS at 09:16

## 2021-07-29 RX ADMIN — LIDOCAINE HYDROCHLORIDE 50 MG: 20 INJECTION, SOLUTION EPIDURAL; INFILTRATION; INTRACAUDAL; PERINEURAL at 11:28

## 2021-07-29 RX ADMIN — SODIUM CHLORIDE: 0.9 INJECTION, SOLUTION INTRAVENOUS at 11:22

## 2021-07-29 RX ADMIN — ROCURONIUM BROMIDE 40 MG: 10 INJECTION INTRAVENOUS at 11:28

## 2021-07-29 RX ADMIN — HYDROMORPHONE HYDROCHLORIDE 0.5 MG: 2 INJECTION, SOLUTION INTRAMUSCULAR; INTRAVENOUS; SUBCUTANEOUS at 12:47

## 2021-07-29 RX ADMIN — PREGABALIN 150 MG: 75 CAPSULE ORAL at 20:25

## 2021-07-29 RX ADMIN — INSULIN LISPRO 3 UNITS: 100 INJECTION, SOLUTION INTRAVENOUS; SUBCUTANEOUS at 17:12

## 2021-07-29 RX ADMIN — HYDROMORPHONE HYDROCHLORIDE 0.5 MG: 2 INJECTION, SOLUTION INTRAMUSCULAR; INTRAVENOUS; SUBCUTANEOUS at 13:02

## 2021-07-29 RX ADMIN — VANCOMYCIN HYDROCHLORIDE 1000 MG: 1 INJECTION, POWDER, LYOPHILIZED, FOR SOLUTION INTRAVENOUS at 20:24

## 2021-07-29 RX ADMIN — HYDROMORPHONE HYDROCHLORIDE 0.5 MG: 2 INJECTION, SOLUTION INTRAMUSCULAR; INTRAVENOUS; SUBCUTANEOUS at 05:02

## 2021-07-29 RX ADMIN — ROPINIROLE HYDROCHLORIDE 0.5 MG: 0.25 TABLET, FILM COATED ORAL at 20:26

## 2021-07-29 RX ADMIN — DEXAMETHASONE SODIUM PHOSPHATE 4 MG: 4 INJECTION, SOLUTION INTRAMUSCULAR; INTRAVENOUS at 11:48

## 2021-07-29 RX ADMIN — HYDROMORPHONE HYDROCHLORIDE 0.5 MG: 2 INJECTION, SOLUTION INTRAMUSCULAR; INTRAVENOUS; SUBCUTANEOUS at 14:17

## 2021-07-29 RX ADMIN — Medication 100 MCG: at 11:44

## 2021-07-29 RX ADMIN — PROPOFOL 200 MG: 10 INJECTION, EMULSION INTRAVENOUS at 11:28

## 2021-07-29 NOTE — ANESTHESIA PROCEDURE NOTES
Airway  Urgency: elective    Date/Time: 7/29/2021 11:29 AM  Airway not difficult    General Information and Staff    Patient location during procedure: OR  Anesthesiologist: Aldo Vazquez MD    Indications and Patient Condition  Indications for airway management: airway protection    Preoxygenated: yes  MILS not maintained throughout  Mask difficulty assessment: 1 - vent by mask    Final Airway Details  Final airway type: endotracheal airway      Successful airway: ETT  Cuffed: yes   Successful intubation technique: direct laryngoscopy  Endotracheal tube insertion site: oral  Blade: Grace  Blade size: 4  ETT size (mm): 7.5  Cormack-Lehane Classification: grade I - full view of glottis  Placement verified by: chest auscultation and capnometry   Measured from: gums  ETT/EBT to gums (cm): 22  Number of attempts at approach: 1  Assessment: lips, teeth, and gum same as pre-op and atraumatic intubation

## 2021-07-30 LAB
ALBUMIN SERPL-MCNC: 2.7 G/DL (ref 3.5–5.2)
ALBUMIN/GLOB SERPL: 0.8 G/DL
ALP SERPL-CCNC: 350 U/L (ref 39–117)
ALT SERPL W P-5'-P-CCNC: 34 U/L (ref 1–41)
ANION GAP SERPL CALCULATED.3IONS-SCNC: 10 MMOL/L (ref 5–15)
AST SERPL-CCNC: 22 U/L (ref 1–40)
BACTERIA SPEC AEROBE CULT: ABNORMAL
BASOPHILS # BLD AUTO: 0.1 10*3/MM3 (ref 0–0.2)
BASOPHILS NFR BLD AUTO: 0.8 % (ref 0–1.5)
BILIRUB SERPL-MCNC: 0.6 MG/DL (ref 0–1.2)
BUN SERPL-MCNC: 15 MG/DL (ref 8–23)
BUN/CREAT SERPL: 14.7 (ref 7–25)
CALCIUM SPEC-SCNC: 8.4 MG/DL (ref 8.6–10.5)
CHLORIDE SERPL-SCNC: 100 MMOL/L (ref 98–107)
CO2 SERPL-SCNC: 24 MMOL/L (ref 22–29)
CREAT SERPL-MCNC: 1.02 MG/DL (ref 0.76–1.27)
DEPRECATED RDW RBC AUTO: 49 FL (ref 37–54)
EOSINOPHIL # BLD AUTO: 0 10*3/MM3 (ref 0–0.4)
EOSINOPHIL NFR BLD AUTO: 0.3 % (ref 0.3–6.2)
ERYTHROCYTE [DISTWIDTH] IN BLOOD BY AUTOMATED COUNT: 17 % (ref 12.3–15.4)
GFR SERPL CREATININE-BSD FRML MDRD: 71 ML/MIN/1.73
GLOBULIN UR ELPH-MCNC: 3.6 GM/DL
GLUCOSE BLDC GLUCOMTR-MCNC: 144 MG/DL (ref 70–105)
GLUCOSE BLDC GLUCOMTR-MCNC: 167 MG/DL (ref 70–105)
GLUCOSE BLDC GLUCOMTR-MCNC: 189 MG/DL (ref 70–105)
GLUCOSE BLDC GLUCOMTR-MCNC: 210 MG/DL (ref 70–105)
GLUCOSE BLDC GLUCOMTR-MCNC: 226 MG/DL (ref 70–105)
GLUCOSE SERPL-MCNC: 191 MG/DL (ref 65–99)
GRAM STN SPEC: ABNORMAL
GRAM STN SPEC: ABNORMAL
HCT VFR BLD AUTO: 34.2 % (ref 37.5–51)
HGB BLD-MCNC: 11.3 G/DL (ref 13–17.7)
LYMPHOCYTES # BLD AUTO: 0.6 10*3/MM3 (ref 0.7–3.1)
LYMPHOCYTES NFR BLD AUTO: 6 % (ref 19.6–45.3)
MCH RBC QN AUTO: 26.9 PG (ref 26.6–33)
MCHC RBC AUTO-ENTMCNC: 33 G/DL (ref 31.5–35.7)
MCV RBC AUTO: 81.3 FL (ref 79–97)
MONOCYTES # BLD AUTO: 0.5 10*3/MM3 (ref 0.1–0.9)
MONOCYTES NFR BLD AUTO: 4.5 % (ref 5–12)
NEUTROPHILS NFR BLD AUTO: 88.4 % (ref 42.7–76)
NEUTROPHILS NFR BLD AUTO: 9.2 10*3/MM3 (ref 1.7–7)
NRBC BLD AUTO-RTO: 0 /100 WBC (ref 0–0.2)
PLATELET # BLD AUTO: 495 10*3/MM3 (ref 140–450)
PMV BLD AUTO: 6.8 FL (ref 6–12)
POTASSIUM SERPL-SCNC: 5.2 MMOL/L (ref 3.5–5.2)
PROT SERPL-MCNC: 6.3 G/DL (ref 6–8.5)
RBC # BLD AUTO: 4.2 10*6/MM3 (ref 4.14–5.8)
SODIUM SERPL-SCNC: 134 MMOL/L (ref 136–145)
VANCOMYCIN PEAK SERPL-MCNC: 29.3 MCG/ML (ref 20–40)
VANCOMYCIN TROUGH SERPL-MCNC: 18.1 MCG/ML (ref 5–20)
WBC # BLD AUTO: 10.4 10*3/MM3 (ref 3.4–10.8)

## 2021-07-30 PROCEDURE — 63710000001 INSULIN LISPRO (HUMAN) PER 5 UNITS: Performed by: SURGERY

## 2021-07-30 PROCEDURE — 25010000002 HYDROMORPHONE PER 4 MG: Performed by: SURGERY

## 2021-07-30 PROCEDURE — 80202 ASSAY OF VANCOMYCIN: CPT | Performed by: SURGERY

## 2021-07-30 PROCEDURE — 80053 COMPREHEN METABOLIC PANEL: CPT | Performed by: SURGERY

## 2021-07-30 PROCEDURE — 25010000002 VANCOMYCIN 750 MG RECONSTITUTED SOLUTION 1 EACH VIAL: Performed by: FAMILY MEDICINE

## 2021-07-30 PROCEDURE — 82962 GLUCOSE BLOOD TEST: CPT

## 2021-07-30 PROCEDURE — 85025 COMPLETE CBC W/AUTO DIFF WBC: CPT | Performed by: SURGERY

## 2021-07-30 PROCEDURE — 94799 UNLISTED PULMONARY SVC/PX: CPT

## 2021-07-30 PROCEDURE — 25010000002 VANCOMYCIN 1 G RECONSTITUTED SOLUTION 1 EACH VIAL: Performed by: SURGERY

## 2021-07-30 PROCEDURE — 25010000002 ALTEPLASE 2 MG RECONSTITUTED SOLUTION 1 EACH VIAL: Performed by: FAMILY MEDICINE

## 2021-07-30 PROCEDURE — 99024 POSTOP FOLLOW-UP VISIT: CPT | Performed by: SURGERY

## 2021-07-30 PROCEDURE — 80202 ASSAY OF VANCOMYCIN: CPT | Performed by: FAMILY MEDICINE

## 2021-07-30 RX ORDER — CHOLECALCIFEROL (VITAMIN D3) 125 MCG
5 CAPSULE ORAL NIGHTLY PRN
Status: DISCONTINUED | OUTPATIENT
Start: 2021-07-30 | End: 2021-08-06 | Stop reason: HOSPADM

## 2021-07-30 RX ORDER — BISACODYL 10 MG
10 SUPPOSITORY, RECTAL RECTAL DAILY PRN
Status: DISCONTINUED | OUTPATIENT
Start: 2021-07-30 | End: 2021-08-06 | Stop reason: HOSPADM

## 2021-07-30 RX ORDER — POTASSIUM CHLORIDE 1.5 G/1.77G
40 POWDER, FOR SOLUTION ORAL AS NEEDED
Status: DISCONTINUED | OUTPATIENT
Start: 2021-07-30 | End: 2021-08-06 | Stop reason: HOSPADM

## 2021-07-30 RX ORDER — CALCIUM CARBONATE 200(500)MG
2 TABLET,CHEWABLE ORAL 3 TIMES DAILY PRN
Status: DISCONTINUED | OUTPATIENT
Start: 2021-07-30 | End: 2021-08-06 | Stop reason: HOSPADM

## 2021-07-30 RX ORDER — POTASSIUM CHLORIDE 20 MEQ/1
40 TABLET, EXTENDED RELEASE ORAL AS NEEDED
Status: DISCONTINUED | OUTPATIENT
Start: 2021-07-30 | End: 2021-08-06 | Stop reason: HOSPADM

## 2021-07-30 RX ORDER — IPRATROPIUM BROMIDE AND ALBUTEROL SULFATE 2.5; .5 MG/3ML; MG/3ML
3 SOLUTION RESPIRATORY (INHALATION) EVERY 4 HOURS PRN
Status: DISCONTINUED | OUTPATIENT
Start: 2021-07-30 | End: 2021-08-06 | Stop reason: HOSPADM

## 2021-07-30 RX ORDER — HYDRALAZINE HYDROCHLORIDE 20 MG/ML
20 INJECTION INTRAMUSCULAR; INTRAVENOUS EVERY 6 HOURS PRN
Status: DISCONTINUED | OUTPATIENT
Start: 2021-07-30 | End: 2021-08-06 | Stop reason: HOSPADM

## 2021-07-30 RX ORDER — MAGNESIUM SULFATE HEPTAHYDRATE 40 MG/ML
2 INJECTION, SOLUTION INTRAVENOUS AS NEEDED
Status: DISCONTINUED | OUTPATIENT
Start: 2021-07-30 | End: 2021-08-06 | Stop reason: HOSPADM

## 2021-07-30 RX ORDER — DOCUSATE SODIUM 100 MG/1
100 CAPSULE, LIQUID FILLED ORAL 2 TIMES DAILY PRN
Status: DISCONTINUED | OUTPATIENT
Start: 2021-07-30 | End: 2021-08-06 | Stop reason: HOSPADM

## 2021-07-30 RX ORDER — MAGNESIUM SULFATE 1 G/100ML
1 INJECTION INTRAVENOUS AS NEEDED
Status: DISCONTINUED | OUTPATIENT
Start: 2021-07-30 | End: 2021-08-06 | Stop reason: HOSPADM

## 2021-07-30 RX ORDER — POTASSIUM CHLORIDE 7.45 MG/ML
10 INJECTION INTRAVENOUS
Status: DISCONTINUED | OUTPATIENT
Start: 2021-07-30 | End: 2021-08-06 | Stop reason: HOSPADM

## 2021-07-30 RX ADMIN — VANCOMYCIN HYDROCHLORIDE 1000 MG: 1 INJECTION, POWDER, LYOPHILIZED, FOR SOLUTION INTRAVENOUS at 08:43

## 2021-07-30 RX ADMIN — PREGABALIN 150 MG: 75 CAPSULE ORAL at 08:42

## 2021-07-30 RX ADMIN — BUDESONIDE 0.5 MG: 0.5 SUSPENSION RESPIRATORY (INHALATION) at 07:44

## 2021-07-30 RX ADMIN — ATORVASTATIN CALCIUM 40 MG: 40 TABLET, FILM COATED ORAL at 08:42

## 2021-07-30 RX ADMIN — POTASSIUM CHLORIDE 10 MEQ: 750 TABLET, EXTENDED RELEASE ORAL at 13:11

## 2021-07-30 RX ADMIN — INSULIN LISPRO 2 UNITS: 100 INJECTION, SOLUTION INTRAVENOUS; SUBCUTANEOUS at 08:42

## 2021-07-30 RX ADMIN — HYDROMORPHONE HYDROCHLORIDE 0.5 MG: 2 INJECTION, SOLUTION INTRAMUSCULAR; INTRAVENOUS; SUBCUTANEOUS at 22:21

## 2021-07-30 RX ADMIN — HYDROMORPHONE HYDROCHLORIDE 0.5 MG: 2 INJECTION, SOLUTION INTRAMUSCULAR; INTRAVENOUS; SUBCUTANEOUS at 18:11

## 2021-07-30 RX ADMIN — IPRATROPIUM BROMIDE AND ALBUTEROL SULFATE 3 ML: 2.5; .5 SOLUTION RESPIRATORY (INHALATION) at 18:16

## 2021-07-30 RX ADMIN — FUROSEMIDE 20 MG: 20 TABLET ORAL at 08:42

## 2021-07-30 RX ADMIN — ASPIRIN 81 MG: 81 TABLET, COATED ORAL at 08:42

## 2021-07-30 RX ADMIN — CLOPIDOGREL BISULFATE 75 MG: 75 TABLET ORAL at 08:42

## 2021-07-30 RX ADMIN — POTASSIUM CHLORIDE 10 MEQ: 750 TABLET, EXTENDED RELEASE ORAL at 08:42

## 2021-07-30 RX ADMIN — HYDROMORPHONE HYDROCHLORIDE 0.5 MG: 2 INJECTION, SOLUTION INTRAMUSCULAR; INTRAVENOUS; SUBCUTANEOUS at 14:08

## 2021-07-30 RX ADMIN — SODIUM CHLORIDE 100 ML/HR: 9 INJECTION, SOLUTION INTRAVENOUS at 03:53

## 2021-07-30 RX ADMIN — INSULIN LISPRO 3 UNITS: 100 INJECTION, SOLUTION INTRAVENOUS; SUBCUTANEOUS at 13:11

## 2021-07-30 RX ADMIN — VANCOMYCIN HYDROCHLORIDE 750 MG: 750 INJECTION, POWDER, LYOPHILIZED, FOR SOLUTION INTRAVENOUS at 23:30

## 2021-07-30 RX ADMIN — IPRATROPIUM BROMIDE AND ALBUTEROL SULFATE 3 ML: 2.5; .5 SOLUTION RESPIRATORY (INHALATION) at 11:20

## 2021-07-30 RX ADMIN — BUDESONIDE 0.5 MG: 0.5 SUSPENSION RESPIRATORY (INHALATION) at 18:03

## 2021-07-30 RX ADMIN — HYDROMORPHONE HYDROCHLORIDE 0.5 MG: 2 INJECTION, SOLUTION INTRAMUSCULAR; INTRAVENOUS; SUBCUTANEOUS at 01:52

## 2021-07-30 RX ADMIN — LOSARTAN POTASSIUM 50 MG: 50 TABLET, FILM COATED ORAL at 08:42

## 2021-07-30 RX ADMIN — METOPROLOL TARTRATE 25 MG: 25 TABLET, FILM COATED ORAL at 08:42

## 2021-07-30 RX ADMIN — IPRATROPIUM BROMIDE AND ALBUTEROL SULFATE 3 ML: 2.5; .5 SOLUTION RESPIRATORY (INHALATION) at 01:50

## 2021-07-30 RX ADMIN — HYDROMORPHONE HYDROCHLORIDE 0.5 MG: 2 INJECTION, SOLUTION INTRAMUSCULAR; INTRAVENOUS; SUBCUTANEOUS at 10:03

## 2021-07-30 RX ADMIN — IPRATROPIUM BROMIDE AND ALBUTEROL SULFATE 3 ML: 2.5; .5 SOLUTION RESPIRATORY (INHALATION) at 07:44

## 2021-07-30 RX ADMIN — WATER 2 MG: 1 INJECTION INTRAMUSCULAR; INTRAVENOUS; SUBCUTANEOUS at 16:25

## 2021-07-30 RX ADMIN — POTASSIUM CHLORIDE 10 MEQ: 750 TABLET, EXTENDED RELEASE ORAL at 18:11

## 2021-07-30 RX ADMIN — DOCUSATE SODIUM 100 MG: 100 CAPSULE, LIQUID FILLED ORAL at 08:42

## 2021-07-30 RX ADMIN — HYDROMORPHONE HYDROCHLORIDE 0.5 MG: 2 INJECTION, SOLUTION INTRAMUSCULAR; INTRAVENOUS; SUBCUTANEOUS at 05:58

## 2021-07-30 RX ADMIN — IPRATROPIUM BROMIDE AND ALBUTEROL SULFATE 3 ML: 2.5; .5 SOLUTION RESPIRATORY (INHALATION) at 23:27

## 2021-07-30 NOTE — ANESTHESIA POSTPROCEDURE EVALUATION
Patient: Ro Nathan    Procedure Summary     Date: 07/29/21 Room / Location: [facility] OR  / [facility] MAIN OR    Anesthesia Start: 1117 Anesthesia Stop:     Procedure: EXCISIONAL ABDOMINAL WOUND  DEBRIDEMENT (N/A Buttocks) Diagnosis:       Cellulitis of abdominal wall      (Cellulitis of abdominal wall [L03.311])    Surgeons: Cleopatra Wang MD Provider: Aldo Vazquez MD    Anesthesia Type: general ASA Status: 4 - Emergent          Anesthesia Type: general    Vitals  Vitals Value Taken Time   /68 07/29/21 1519   Temp 96.8 °F (36 °C) 07/29/21 1222   Pulse 70 07/29/21 1521   Resp 14 07/29/21 1516   SpO2 95 % 07/29/21 1521   Vitals shown include unvalidated device data.        Post Anesthesia Care and Evaluation    Patient location during evaluation: PACU  Patient participation: complete - patient participated  Level of consciousness: awake  Pain scale: See nurse's notes for pain score.  Pain management: adequate  Airway patency: patent  Anesthetic complications: No anesthetic complications  PONV Status: none  Cardiovascular status: acceptable  Respiratory status: acceptable  Hydration status: acceptable    Comments: Patient seen and examined postoperatively; vital signs stable; SpO2 greater than or equal to 90%; cardiopulmonary status stable; nausea/vomiting adequately controlled; pain adequately controlled; no apparent anesthesia complications; patient discharged from anesthesia care when discharge criteria were met

## 2021-07-31 ENCOUNTER — APPOINTMENT (OUTPATIENT)
Dept: CT IMAGING | Facility: HOSPITAL | Age: 76
End: 2021-07-31

## 2021-07-31 ENCOUNTER — ANESTHESIA EVENT (OUTPATIENT)
Dept: PERIOP | Facility: HOSPITAL | Age: 76
End: 2021-07-31

## 2021-07-31 LAB
ALBUMIN SERPL-MCNC: 2.9 G/DL (ref 3.5–5.2)
ALBUMIN/GLOB SERPL: 0.9 G/DL
ALP SERPL-CCNC: 297 U/L (ref 39–117)
ALT SERPL W P-5'-P-CCNC: 23 U/L (ref 1–41)
ANION GAP SERPL CALCULATED.3IONS-SCNC: 10 MMOL/L (ref 5–15)
AST SERPL-CCNC: 20 U/L (ref 1–40)
BACTERIA SPEC AEROBE CULT: ABNORMAL
BACTERIA SPEC AEROBE CULT: ABNORMAL
BASOPHILS # BLD AUTO: 0 10*3/MM3 (ref 0–0.2)
BASOPHILS NFR BLD AUTO: 0.3 % (ref 0–1.5)
BILIRUB SERPL-MCNC: 0.4 MG/DL (ref 0–1.2)
BUN SERPL-MCNC: 12 MG/DL (ref 8–23)
BUN/CREAT SERPL: 12.6 (ref 7–25)
CALCIUM SPEC-SCNC: 8.4 MG/DL (ref 8.6–10.5)
CHLORIDE SERPL-SCNC: 99 MMOL/L (ref 98–107)
CO2 SERPL-SCNC: 25 MMOL/L (ref 22–29)
CREAT SERPL-MCNC: 0.95 MG/DL (ref 0.76–1.27)
DEPRECATED RDW RBC AUTO: 49.9 FL (ref 37–54)
EOSINOPHIL # BLD AUTO: 0.2 10*3/MM3 (ref 0–0.4)
EOSINOPHIL NFR BLD AUTO: 2.9 % (ref 0.3–6.2)
ERYTHROCYTE [DISTWIDTH] IN BLOOD BY AUTOMATED COUNT: 17.3 % (ref 12.3–15.4)
GFR SERPL CREATININE-BSD FRML MDRD: 77 ML/MIN/1.73
GLOBULIN UR ELPH-MCNC: 3.2 GM/DL
GLUCOSE BLDC GLUCOMTR-MCNC: 138 MG/DL (ref 70–105)
GLUCOSE BLDC GLUCOMTR-MCNC: 141 MG/DL (ref 70–105)
GLUCOSE BLDC GLUCOMTR-MCNC: 160 MG/DL (ref 70–105)
GLUCOSE BLDC GLUCOMTR-MCNC: 163 MG/DL (ref 70–105)
GLUCOSE SERPL-MCNC: 139 MG/DL (ref 65–99)
GRAM STN SPEC: ABNORMAL
HCT VFR BLD AUTO: 32.8 % (ref 37.5–51)
HGB BLD-MCNC: 10.9 G/DL (ref 13–17.7)
LYMPHOCYTES # BLD AUTO: 1.3 10*3/MM3 (ref 0.7–3.1)
LYMPHOCYTES NFR BLD AUTO: 15.8 % (ref 19.6–45.3)
MCH RBC QN AUTO: 27.2 PG (ref 26.6–33)
MCHC RBC AUTO-ENTMCNC: 33.2 G/DL (ref 31.5–35.7)
MCV RBC AUTO: 81.8 FL (ref 79–97)
MONOCYTES # BLD AUTO: 0.6 10*3/MM3 (ref 0.1–0.9)
MONOCYTES NFR BLD AUTO: 7.7 % (ref 5–12)
NEUTROPHILS NFR BLD AUTO: 5.9 10*3/MM3 (ref 1.7–7)
NEUTROPHILS NFR BLD AUTO: 73.3 % (ref 42.7–76)
NRBC BLD AUTO-RTO: 0.1 /100 WBC (ref 0–0.2)
PLATELET # BLD AUTO: 476 10*3/MM3 (ref 140–450)
PMV BLD AUTO: 7.3 FL (ref 6–12)
POTASSIUM SERPL-SCNC: 4.2 MMOL/L (ref 3.5–5.2)
PROT SERPL-MCNC: 6.1 G/DL (ref 6–8.5)
RBC # BLD AUTO: 4 10*6/MM3 (ref 4.14–5.8)
SODIUM SERPL-SCNC: 134 MMOL/L (ref 136–145)
WBC # BLD AUTO: 8 10*3/MM3 (ref 3.4–10.8)

## 2021-07-31 PROCEDURE — 94799 UNLISTED PULMONARY SVC/PX: CPT

## 2021-07-31 PROCEDURE — 63710000001 INSULIN LISPRO (HUMAN) PER 5 UNITS: Performed by: SURGERY

## 2021-07-31 PROCEDURE — 74177 CT ABD & PELVIS W/CONTRAST: CPT

## 2021-07-31 PROCEDURE — 80053 COMPREHEN METABOLIC PANEL: CPT | Performed by: SURGERY

## 2021-07-31 PROCEDURE — 85025 COMPLETE CBC W/AUTO DIFF WBC: CPT | Performed by: SURGERY

## 2021-07-31 PROCEDURE — 82962 GLUCOSE BLOOD TEST: CPT

## 2021-07-31 PROCEDURE — 25010000002 HYDROMORPHONE PER 4 MG: Performed by: SURGERY

## 2021-07-31 PROCEDURE — 0 IOPAMIDOL PER 1 ML: Performed by: FAMILY MEDICINE

## 2021-07-31 PROCEDURE — 25010000002 VANCOMYCIN 750 MG RECONSTITUTED SOLUTION 1 EACH VIAL: Performed by: FAMILY MEDICINE

## 2021-07-31 PROCEDURE — 97161 PT EVAL LOW COMPLEX 20 MIN: CPT

## 2021-07-31 RX ADMIN — INSULIN LISPRO 2 UNITS: 100 INJECTION, SOLUTION INTRAVENOUS; SUBCUTANEOUS at 12:34

## 2021-07-31 RX ADMIN — Medication 10 ML: at 20:35

## 2021-07-31 RX ADMIN — IPRATROPIUM BROMIDE AND ALBUTEROL SULFATE 3 ML: 2.5; .5 SOLUTION RESPIRATORY (INHALATION) at 10:00

## 2021-07-31 RX ADMIN — ATORVASTATIN CALCIUM 40 MG: 40 TABLET, FILM COATED ORAL at 08:07

## 2021-07-31 RX ADMIN — INSULIN LISPRO 2 UNITS: 100 INJECTION, SOLUTION INTRAVENOUS; SUBCUTANEOUS at 16:35

## 2021-07-31 RX ADMIN — POTASSIUM CHLORIDE 10 MEQ: 750 TABLET, EXTENDED RELEASE ORAL at 10:23

## 2021-07-31 RX ADMIN — DOCUSATE SODIUM 100 MG: 100 CAPSULE, LIQUID FILLED ORAL at 20:36

## 2021-07-31 RX ADMIN — CLOPIDOGREL BISULFATE 75 MG: 75 TABLET ORAL at 08:07

## 2021-07-31 RX ADMIN — HYDROMORPHONE HYDROCHLORIDE 0.5 MG: 2 INJECTION, SOLUTION INTRAMUSCULAR; INTRAVENOUS; SUBCUTANEOUS at 14:11

## 2021-07-31 RX ADMIN — HYDROMORPHONE HYDROCHLORIDE 0.5 MG: 2 INJECTION, SOLUTION INTRAMUSCULAR; INTRAVENOUS; SUBCUTANEOUS at 06:18

## 2021-07-31 RX ADMIN — ROPINIROLE HYDROCHLORIDE 0.5 MG: 0.25 TABLET, FILM COATED ORAL at 20:35

## 2021-07-31 RX ADMIN — TRAZODONE HYDROCHLORIDE 100 MG: 100 TABLET ORAL at 20:36

## 2021-07-31 RX ADMIN — LOSARTAN POTASSIUM 50 MG: 50 TABLET, FILM COATED ORAL at 08:07

## 2021-07-31 RX ADMIN — VANCOMYCIN HYDROCHLORIDE 750 MG: 750 INJECTION, POWDER, LYOPHILIZED, FOR SOLUTION INTRAVENOUS at 22:24

## 2021-07-31 RX ADMIN — HYDROMORPHONE HYDROCHLORIDE 0.5 MG: 2 INJECTION, SOLUTION INTRAMUSCULAR; INTRAVENOUS; SUBCUTANEOUS at 10:23

## 2021-07-31 RX ADMIN — VANCOMYCIN HYDROCHLORIDE 750 MG: 750 INJECTION, POWDER, LYOPHILIZED, FOR SOLUTION INTRAVENOUS at 10:23

## 2021-07-31 RX ADMIN — DOCUSATE SODIUM 100 MG: 100 CAPSULE, LIQUID FILLED ORAL at 08:07

## 2021-07-31 RX ADMIN — POTASSIUM CHLORIDE 10 MEQ: 750 TABLET, EXTENDED RELEASE ORAL at 16:35

## 2021-07-31 RX ADMIN — INSULIN LISPRO 2 UNITS: 100 INJECTION, SOLUTION INTRAVENOUS; SUBCUTANEOUS at 08:09

## 2021-07-31 RX ADMIN — HYDROMORPHONE HYDROCHLORIDE 0.5 MG: 2 INJECTION, SOLUTION INTRAMUSCULAR; INTRAVENOUS; SUBCUTANEOUS at 18:24

## 2021-07-31 RX ADMIN — HYDROMORPHONE HYDROCHLORIDE 0.5 MG: 2 INJECTION, SOLUTION INTRAMUSCULAR; INTRAVENOUS; SUBCUTANEOUS at 02:24

## 2021-07-31 RX ADMIN — IOPAMIDOL 100 ML: 755 INJECTION, SOLUTION INTRAVENOUS at 00:57

## 2021-07-31 RX ADMIN — MIRTAZAPINE 30 MG: 15 TABLET, FILM COATED ORAL at 20:36

## 2021-07-31 RX ADMIN — PREGABALIN 150 MG: 75 CAPSULE ORAL at 08:06

## 2021-07-31 RX ADMIN — PREGABALIN 150 MG: 75 CAPSULE ORAL at 20:36

## 2021-07-31 RX ADMIN — ASPIRIN 81 MG: 81 TABLET, COATED ORAL at 08:07

## 2021-07-31 RX ADMIN — IPRATROPIUM BROMIDE AND ALBUTEROL SULFATE 3 ML: 2.5; .5 SOLUTION RESPIRATORY (INHALATION) at 23:41

## 2021-07-31 RX ADMIN — METOPROLOL TARTRATE 25 MG: 25 TABLET, FILM COATED ORAL at 20:36

## 2021-07-31 RX ADMIN — BUDESONIDE 0.5 MG: 0.5 SUSPENSION RESPIRATORY (INHALATION) at 18:11

## 2021-07-31 RX ADMIN — HYDROMORPHONE HYDROCHLORIDE 0.5 MG: 2 INJECTION, SOLUTION INTRAMUSCULAR; INTRAVENOUS; SUBCUTANEOUS at 22:24

## 2021-07-31 RX ADMIN — IPRATROPIUM BROMIDE AND ALBUTEROL SULFATE 3 ML: 2.5; .5 SOLUTION RESPIRATORY (INHALATION) at 18:17

## 2021-07-31 RX ADMIN — FUROSEMIDE 20 MG: 20 TABLET ORAL at 08:07

## 2021-07-31 RX ADMIN — HYDROCODONE BITARTRATE AND ACETAMINOPHEN 1 TABLET: 7.5; 325 TABLET ORAL at 12:48

## 2021-07-31 RX ADMIN — POTASSIUM CHLORIDE 10 MEQ: 750 TABLET, EXTENDED RELEASE ORAL at 08:07

## 2021-07-31 RX ADMIN — IPRATROPIUM BROMIDE AND ALBUTEROL SULFATE 3 ML: 2.5; .5 SOLUTION RESPIRATORY (INHALATION) at 14:50

## 2021-08-01 ENCOUNTER — ANESTHESIA (OUTPATIENT)
Dept: PERIOP | Facility: HOSPITAL | Age: 76
End: 2021-08-01

## 2021-08-01 LAB
ALBUMIN SERPL-MCNC: 3 G/DL (ref 3.5–5.2)
ALBUMIN/GLOB SERPL: 0.9 G/DL
ALP SERPL-CCNC: 277 U/L (ref 39–117)
ALT SERPL W P-5'-P-CCNC: 18 U/L (ref 1–41)
ANION GAP SERPL CALCULATED.3IONS-SCNC: 9 MMOL/L (ref 5–15)
AST SERPL-CCNC: 19 U/L (ref 1–40)
BACTERIA SPEC AEROBE CULT: NORMAL
BACTERIA SPEC AEROBE CULT: NORMAL
BASOPHILS # BLD AUTO: 0.2 10*3/MM3 (ref 0–0.2)
BASOPHILS NFR BLD AUTO: 2.2 % (ref 0–1.5)
BILIRUB SERPL-MCNC: 0.6 MG/DL (ref 0–1.2)
BUN SERPL-MCNC: 10 MG/DL (ref 8–23)
BUN/CREAT SERPL: 11.2 (ref 7–25)
CALCIUM SPEC-SCNC: 8.6 MG/DL (ref 8.6–10.5)
CHLORIDE SERPL-SCNC: 101 MMOL/L (ref 98–107)
CO2 SERPL-SCNC: 25 MMOL/L (ref 22–29)
CREAT SERPL-MCNC: 0.89 MG/DL (ref 0.76–1.27)
DEPRECATED RDW RBC AUTO: 50.3 FL (ref 37–54)
EOSINOPHIL # BLD AUTO: 0.3 10*3/MM3 (ref 0–0.4)
EOSINOPHIL NFR BLD AUTO: 3.7 % (ref 0.3–6.2)
ERYTHROCYTE [DISTWIDTH] IN BLOOD BY AUTOMATED COUNT: 17.7 % (ref 12.3–15.4)
GFR SERPL CREATININE-BSD FRML MDRD: 84 ML/MIN/1.73
GLOBULIN UR ELPH-MCNC: 3.4 GM/DL
GLUCOSE BLDC GLUCOMTR-MCNC: 150 MG/DL (ref 70–105)
GLUCOSE BLDC GLUCOMTR-MCNC: 150 MG/DL (ref 70–105)
GLUCOSE BLDC GLUCOMTR-MCNC: 173 MG/DL (ref 70–105)
GLUCOSE BLDC GLUCOMTR-MCNC: 220 MG/DL (ref 70–105)
GLUCOSE SERPL-MCNC: 135 MG/DL (ref 65–99)
HCT VFR BLD AUTO: 34.2 % (ref 37.5–51)
HGB BLD-MCNC: 11.4 G/DL (ref 13–17.7)
LYMPHOCYTES # BLD AUTO: 1.1 10*3/MM3 (ref 0.7–3.1)
LYMPHOCYTES NFR BLD AUTO: 14.6 % (ref 19.6–45.3)
MCH RBC QN AUTO: 27.8 PG (ref 26.6–33)
MCHC RBC AUTO-ENTMCNC: 33.3 G/DL (ref 31.5–35.7)
MCV RBC AUTO: 83.4 FL (ref 79–97)
MONOCYTES # BLD AUTO: 0.6 10*3/MM3 (ref 0.1–0.9)
MONOCYTES NFR BLD AUTO: 8.3 % (ref 5–12)
NEUTROPHILS NFR BLD AUTO: 5.5 10*3/MM3 (ref 1.7–7)
NEUTROPHILS NFR BLD AUTO: 71.2 % (ref 42.7–76)
NRBC BLD AUTO-RTO: 0 /100 WBC (ref 0–0.2)
PLATELET # BLD AUTO: 497 10*3/MM3 (ref 140–450)
PMV BLD AUTO: 7.4 FL (ref 6–12)
POTASSIUM SERPL-SCNC: 4.2 MMOL/L (ref 3.5–5.2)
PROT SERPL-MCNC: 6.4 G/DL (ref 6–8.5)
RBC # BLD AUTO: 4.11 10*6/MM3 (ref 4.14–5.8)
SODIUM SERPL-SCNC: 135 MMOL/L (ref 136–145)
WBC # BLD AUTO: 7.7 10*3/MM3 (ref 3.4–10.8)

## 2021-08-01 PROCEDURE — 25010000002 HYDROMORPHONE PER 4 MG: Performed by: ANESTHESIOLOGY

## 2021-08-01 PROCEDURE — 87186 SC STD MICRODIL/AGAR DIL: CPT | Performed by: SURGERY

## 2021-08-01 PROCEDURE — 11042 DBRDMT SUBQ TIS 1ST 20SQCM/<: CPT | Performed by: SURGERY

## 2021-08-01 PROCEDURE — 94799 UNLISTED PULMONARY SVC/PX: CPT

## 2021-08-01 PROCEDURE — 25010000002 HYDROMORPHONE PER 4 MG: Performed by: SURGERY

## 2021-08-01 PROCEDURE — 0JB80ZZ EXCISION OF ABDOMEN SUBCUTANEOUS TISSUE AND FASCIA, OPEN APPROACH: ICD-10-PCS | Performed by: SURGERY

## 2021-08-01 PROCEDURE — 25010000002 VANCOMYCIN 750 MG RECONSTITUTED SOLUTION 1 EACH VIAL: Performed by: SURGERY

## 2021-08-01 PROCEDURE — 63710000001 INSULIN LISPRO (HUMAN) PER 5 UNITS: Performed by: SURGERY

## 2021-08-01 PROCEDURE — 25010000002 FENTANYL CITRATE (PF) 100 MCG/2ML SOLUTION: Performed by: ANESTHESIOLOGY

## 2021-08-01 PROCEDURE — 11045 DBRDMT SUBQ TISS EACH ADDL: CPT | Performed by: SURGERY

## 2021-08-01 PROCEDURE — 87176 TISSUE HOMOGENIZATION CULTR: CPT | Performed by: SURGERY

## 2021-08-01 PROCEDURE — 82962 GLUCOSE BLOOD TEST: CPT

## 2021-08-01 PROCEDURE — 80053 COMPREHEN METABOLIC PANEL: CPT | Performed by: SURGERY

## 2021-08-01 PROCEDURE — 25010000002 VANCOMYCIN 750 MG RECONSTITUTED SOLUTION 1 EACH VIAL: Performed by: FAMILY MEDICINE

## 2021-08-01 PROCEDURE — 85025 COMPLETE CBC W/AUTO DIFF WBC: CPT | Performed by: SURGERY

## 2021-08-01 PROCEDURE — 87147 CULTURE TYPE IMMUNOLOGIC: CPT | Performed by: SURGERY

## 2021-08-01 PROCEDURE — 87070 CULTURE OTHR SPECIMN AEROBIC: CPT | Performed by: SURGERY

## 2021-08-01 PROCEDURE — 25010000002 NEOSTIGMINE 5 MG/5ML SOLUTION: Performed by: ANESTHESIOLOGY

## 2021-08-01 PROCEDURE — 25010000002 PROPOFOL 10 MG/ML EMULSION: Performed by: ANESTHESIOLOGY

## 2021-08-01 PROCEDURE — 25010000002 DEXAMETHASONE PER 1 MG: Performed by: ANESTHESIOLOGY

## 2021-08-01 PROCEDURE — 25010000002 ONDANSETRON PER 1 MG: Performed by: SURGERY

## 2021-08-01 PROCEDURE — 87205 SMEAR GRAM STAIN: CPT | Performed by: SURGERY

## 2021-08-01 RX ORDER — NALOXONE HCL 0.4 MG/ML
0.1 VIAL (ML) INJECTION
Status: DISCONTINUED | OUTPATIENT
Start: 2021-08-01 | End: 2021-08-05

## 2021-08-01 RX ORDER — KETOROLAC TROMETHAMINE 30 MG/ML
30 INJECTION, SOLUTION INTRAMUSCULAR; INTRAVENOUS EVERY 6 HOURS PRN
Status: DISCONTINUED | OUTPATIENT
Start: 2021-08-01 | End: 2021-08-05

## 2021-08-01 RX ORDER — PROPOFOL 10 MG/ML
VIAL (ML) INTRAVENOUS AS NEEDED
Status: DISCONTINUED | OUTPATIENT
Start: 2021-08-01 | End: 2021-08-01 | Stop reason: SURG

## 2021-08-01 RX ORDER — ONDANSETRON 4 MG/1
4 TABLET, FILM COATED ORAL EVERY 6 HOURS PRN
Status: DISCONTINUED | OUTPATIENT
Start: 2021-08-01 | End: 2021-08-06 | Stop reason: HOSPADM

## 2021-08-01 RX ORDER — NEOSTIGMINE METHYLSULFATE 5 MG/5 ML
SYRINGE (ML) INTRAVENOUS AS NEEDED
Status: DISCONTINUED | OUTPATIENT
Start: 2021-08-01 | End: 2021-08-01 | Stop reason: SURG

## 2021-08-01 RX ORDER — OXYCODONE HYDROCHLORIDE 5 MG/1
10 TABLET ORAL EVERY 4 HOURS PRN
Status: DISCONTINUED | OUTPATIENT
Start: 2021-08-01 | End: 2021-08-06 | Stop reason: HOSPADM

## 2021-08-01 RX ORDER — DEXAMETHASONE SODIUM PHOSPHATE 4 MG/ML
INJECTION, SOLUTION INTRA-ARTICULAR; INTRALESIONAL; INTRAMUSCULAR; INTRAVENOUS; SOFT TISSUE AS NEEDED
Status: DISCONTINUED | OUTPATIENT
Start: 2021-08-01 | End: 2021-08-01 | Stop reason: SURG

## 2021-08-01 RX ORDER — ACETAMINOPHEN 650 MG/1
650 SUPPOSITORY RECTAL EVERY 4 HOURS PRN
Status: DISCONTINUED | OUTPATIENT
Start: 2021-08-01 | End: 2021-08-06 | Stop reason: HOSPADM

## 2021-08-01 RX ORDER — HYDROMORPHONE HCL 110MG/55ML
0.5 PATIENT CONTROLLED ANALGESIA SYRINGE INTRAVENOUS
Status: DISCONTINUED | OUTPATIENT
Start: 2021-08-01 | End: 2021-08-01 | Stop reason: HOSPADM

## 2021-08-01 RX ORDER — SODIUM CHLORIDE 9 MG/ML
100 INJECTION, SOLUTION INTRAVENOUS CONTINUOUS
Status: DISCONTINUED | OUTPATIENT
Start: 2021-08-01 | End: 2021-08-04

## 2021-08-01 RX ORDER — HYDROCODONE BITARTRATE AND ACETAMINOPHEN 5; 325 MG/1; MG/1
1 TABLET ORAL EVERY 4 HOURS PRN
Status: DISCONTINUED | OUTPATIENT
Start: 2021-08-01 | End: 2021-08-06 | Stop reason: HOSPADM

## 2021-08-01 RX ORDER — LIDOCAINE HYDROCHLORIDE 20 MG/ML
INJECTION, SOLUTION EPIDURAL; INFILTRATION; INTRACAUDAL; PERINEURAL AS NEEDED
Status: DISCONTINUED | OUTPATIENT
Start: 2021-08-01 | End: 2021-08-01 | Stop reason: SURG

## 2021-08-01 RX ORDER — ROCURONIUM BROMIDE 10 MG/ML
INJECTION, SOLUTION INTRAVENOUS AS NEEDED
Status: DISCONTINUED | OUTPATIENT
Start: 2021-08-01 | End: 2021-08-01 | Stop reason: SURG

## 2021-08-01 RX ORDER — FENTANYL CITRATE 50 UG/ML
INJECTION, SOLUTION INTRAMUSCULAR; INTRAVENOUS AS NEEDED
Status: DISCONTINUED | OUTPATIENT
Start: 2021-08-01 | End: 2021-08-01 | Stop reason: SURG

## 2021-08-01 RX ORDER — ONDANSETRON 2 MG/ML
4 INJECTION INTRAMUSCULAR; INTRAVENOUS ONCE AS NEEDED
Status: DISCONTINUED | OUTPATIENT
Start: 2021-08-01 | End: 2021-08-01 | Stop reason: HOSPADM

## 2021-08-01 RX ORDER — ACETAMINOPHEN 325 MG/1
650 TABLET ORAL EVERY 4 HOURS PRN
Status: DISCONTINUED | OUTPATIENT
Start: 2021-08-01 | End: 2021-08-06 | Stop reason: HOSPADM

## 2021-08-01 RX ORDER — HYDROCODONE BITARTRATE AND ACETAMINOPHEN 10; 325 MG/1; MG/1
1 TABLET ORAL EVERY 4 HOURS PRN
Status: DISCONTINUED | OUTPATIENT
Start: 2021-08-01 | End: 2021-08-01 | Stop reason: HOSPADM

## 2021-08-01 RX ORDER — GLYCOPYRROLATE 1 MG/5 ML
SYRINGE (ML) INTRAVENOUS AS NEEDED
Status: DISCONTINUED | OUTPATIENT
Start: 2021-08-01 | End: 2021-08-01 | Stop reason: SURG

## 2021-08-01 RX ORDER — HYDROMORPHONE HCL 110MG/55ML
0.5 PATIENT CONTROLLED ANALGESIA SYRINGE INTRAVENOUS
Status: DISCONTINUED | OUTPATIENT
Start: 2021-08-01 | End: 2021-08-05

## 2021-08-01 RX ORDER — ONDANSETRON 2 MG/ML
4 INJECTION INTRAMUSCULAR; INTRAVENOUS EVERY 6 HOURS PRN
Status: DISCONTINUED | OUTPATIENT
Start: 2021-08-01 | End: 2021-08-06 | Stop reason: HOSPADM

## 2021-08-01 RX ADMIN — HYDROMORPHONE HYDROCHLORIDE 0.5 MG: 2 INJECTION, SOLUTION INTRAMUSCULAR; INTRAVENOUS; SUBCUTANEOUS at 20:56

## 2021-08-01 RX ADMIN — IPRATROPIUM BROMIDE AND ALBUTEROL SULFATE 3 ML: 2.5; .5 SOLUTION RESPIRATORY (INHALATION) at 19:05

## 2021-08-01 RX ADMIN — PREGABALIN 150 MG: 75 CAPSULE ORAL at 20:49

## 2021-08-01 RX ADMIN — METOPROLOL TARTRATE 25 MG: 25 TABLET, FILM COATED ORAL at 09:46

## 2021-08-01 RX ADMIN — Medication 3 MG: at 12:49

## 2021-08-01 RX ADMIN — HYDROMORPHONE HYDROCHLORIDE 0.5 MG: 2 INJECTION, SOLUTION INTRAMUSCULAR; INTRAVENOUS; SUBCUTANEOUS at 18:22

## 2021-08-01 RX ADMIN — HYDROMORPHONE HYDROCHLORIDE 0.5 MG: 2 INJECTION, SOLUTION INTRAMUSCULAR; INTRAVENOUS; SUBCUTANEOUS at 23:26

## 2021-08-01 RX ADMIN — TRAZODONE HYDROCHLORIDE 100 MG: 100 TABLET ORAL at 20:50

## 2021-08-01 RX ADMIN — ONDANSETRON 4 MG: 2 INJECTION INTRAMUSCULAR; INTRAVENOUS at 12:45

## 2021-08-01 RX ADMIN — Medication 0.6 MG: at 12:49

## 2021-08-01 RX ADMIN — PREGABALIN 150 MG: 75 CAPSULE ORAL at 09:46

## 2021-08-01 RX ADMIN — VANCOMYCIN HYDROCHLORIDE 750 MG: 750 INJECTION, POWDER, LYOPHILIZED, FOR SOLUTION INTRAVENOUS at 23:26

## 2021-08-01 RX ADMIN — HYDROMORPHONE HYDROCHLORIDE 0.5 MG: 2 INJECTION, SOLUTION INTRAMUSCULAR; INTRAVENOUS; SUBCUTANEOUS at 13:18

## 2021-08-01 RX ADMIN — IPRATROPIUM BROMIDE AND ALBUTEROL SULFATE 3 ML: 2.5; .5 SOLUTION RESPIRATORY (INHALATION) at 07:32

## 2021-08-01 RX ADMIN — FENTANYL CITRATE 100 MCG: 50 INJECTION, SOLUTION INTRAMUSCULAR; INTRAVENOUS at 12:32

## 2021-08-01 RX ADMIN — HYDROMORPHONE HYDROCHLORIDE 0.5 MG: 2 INJECTION, SOLUTION INTRAMUSCULAR; INTRAVENOUS; SUBCUTANEOUS at 13:35

## 2021-08-01 RX ADMIN — HYDROMORPHONE HYDROCHLORIDE 0.5 MG: 2 INJECTION, SOLUTION INTRAMUSCULAR; INTRAVENOUS; SUBCUTANEOUS at 03:35

## 2021-08-01 RX ADMIN — ROCURONIUM BROMIDE 35 MG: 10 INJECTION INTRAVENOUS at 12:32

## 2021-08-01 RX ADMIN — POTASSIUM CHLORIDE 10 MEQ: 750 TABLET, EXTENDED RELEASE ORAL at 16:52

## 2021-08-01 RX ADMIN — DOCUSATE SODIUM 100 MG: 100 CAPSULE, LIQUID FILLED ORAL at 20:49

## 2021-08-01 RX ADMIN — HYDROMORPHONE HYDROCHLORIDE 0.5 MG: 2 INJECTION, SOLUTION INTRAMUSCULAR; INTRAVENOUS; SUBCUTANEOUS at 07:28

## 2021-08-01 RX ADMIN — LIDOCAINE HYDROCHLORIDE 50 MG: 20 INJECTION, SOLUTION EPIDURAL; INFILTRATION; INTRACAUDAL; PERINEURAL at 12:32

## 2021-08-01 RX ADMIN — LOSARTAN POTASSIUM 50 MG: 50 TABLET, FILM COATED ORAL at 07:28

## 2021-08-01 RX ADMIN — METOPROLOL TARTRATE 25 MG: 25 TABLET, FILM COATED ORAL at 20:50

## 2021-08-01 RX ADMIN — MIRTAZAPINE 30 MG: 15 TABLET, FILM COATED ORAL at 20:50

## 2021-08-01 RX ADMIN — DEXAMETHASONE SODIUM PHOSPHATE 4 MG: 4 INJECTION, SOLUTION INTRAMUSCULAR; INTRAVENOUS at 12:45

## 2021-08-01 RX ADMIN — INSULIN LISPRO 3 UNITS: 100 INJECTION, SOLUTION INTRAVENOUS; SUBCUTANEOUS at 16:52

## 2021-08-01 RX ADMIN — IPRATROPIUM BROMIDE AND ALBUTEROL SULFATE 3 ML: 2.5; .5 SOLUTION RESPIRATORY (INHALATION) at 23:12

## 2021-08-01 RX ADMIN — Medication 10 ML: at 20:50

## 2021-08-01 RX ADMIN — HYDROCODONE BITARTRATE AND ACETAMINOPHEN 1 TABLET: 7.5; 325 TABLET ORAL at 16:55

## 2021-08-01 RX ADMIN — IPRATROPIUM BROMIDE AND ALBUTEROL SULFATE 3 ML: 2.5; .5 SOLUTION RESPIRATORY (INHALATION) at 11:18

## 2021-08-01 RX ADMIN — PROPOFOL 200 MG: 10 INJECTION, EMULSION INTRAVENOUS at 12:32

## 2021-08-01 RX ADMIN — BUDESONIDE 0.5 MG: 0.5 SUSPENSION RESPIRATORY (INHALATION) at 07:32

## 2021-08-01 RX ADMIN — VANCOMYCIN HYDROCHLORIDE 750 MG: 750 INJECTION, POWDER, LYOPHILIZED, FOR SOLUTION INTRAVENOUS at 11:11

## 2021-08-01 RX ADMIN — HYDROMORPHONE HYDROCHLORIDE 0.5 MG: 2 INJECTION, SOLUTION INTRAMUSCULAR; INTRAVENOUS; SUBCUTANEOUS at 11:17

## 2021-08-01 RX ADMIN — ROPINIROLE HYDROCHLORIDE 0.5 MG: 0.25 TABLET, FILM COATED ORAL at 20:49

## 2021-08-01 RX ADMIN — BUDESONIDE 0.5 MG: 0.5 SUSPENSION RESPIRATORY (INHALATION) at 19:10

## 2021-08-01 RX ADMIN — HYDROMORPHONE HYDROCHLORIDE 0.5 MG: 2 INJECTION, SOLUTION INTRAMUSCULAR; INTRAVENOUS; SUBCUTANEOUS at 14:25

## 2021-08-01 NOTE — ANESTHESIA POSTPROCEDURE EVALUATION
Patient: Ro Nathan    Procedure Summary     Date: 08/01/21 Room / Location: [facility] OR  / [facility] MAIN OR    Anesthesia Start: 1222 Anesthesia Stop: 1304    Procedure: DEBRIDEMENT OF ABDOMINAL WALL (N/A ) Diagnosis:     Surgeons: Hill Bhatia DO Provider: Aldo Vazquez MD    Anesthesia Type: general ASA Status: 4 - Emergent          Anesthesia Type: general    Vitals  Vitals Value Taken Time   /58 08/01/21 1348   Temp 97.8 °F (36.6 °C) 08/01/21 1305   Pulse 73 08/01/21 1353   Resp 16 08/01/21 1320   SpO2 100 % 08/01/21 1353   Vitals shown include unvalidated device data.        Post Anesthesia Care and Evaluation    Patient location during evaluation: PACU  Patient participation: complete - patient participated  Level of consciousness: awake  Pain scale: See nurse's notes for pain score.  Pain management: adequate  Airway patency: patent  Anesthetic complications: No anesthetic complications  PONV Status: none  Cardiovascular status: acceptable  Respiratory status: acceptable  Hydration status: acceptable    Comments: Patient seen and examined postoperatively; vital signs stable; SpO2 greater than or equal to 90%; cardiopulmonary status stable; nausea/vomiting adequately controlled; pain adequately controlled; no apparent anesthesia complications; patient discharged from anesthesia care when discharge criteria were met

## 2021-08-01 NOTE — PLAN OF CARE
Goal Outcome Evaluation:         C/o intermittent abdominal pain through the shift, controlled with medication. For surgery today.

## 2021-08-01 NOTE — PROGRESS NOTES
"     LOS: 3 days   Patient Care Team:  Yusuf Jones MD as PCP - General  PhiJoshua MD as Consulting Physician (Cardiology)  Amberm, Moise Kathleen MD as Consulting Physician (Cardiac Electrophysiology)  Izzy Alvarez MD as Consulting Physician (Nephrology)  Celine Swanson MD as Consulting Physician (Cardiology)    Subjective   \"I'm ok I think, having surgery today\"  Interval History:  Abd/Pelvis CT shows fistula with contrast into subQ tissue, surgery planned    Patient Complaints: Abd pain; no other complaints; in good spirits    History taken from: patient    Review of Systems   Constitutional: Positive for activity change. Negative for appetite change, fatigue and fever.   HENT: Negative for facial swelling.    Eyes: Negative for visual disturbance.   Respiratory: Negative for cough, shortness of breath, wheezing and stridor.    Cardiovascular: Negative for chest pain, palpitations and leg swelling.   Gastrointestinal: Positive for abdominal pain. Negative for constipation, diarrhea, nausea and vomiting.   Genitourinary: Negative for urgency.   Musculoskeletal: Positive for arthralgias.   Skin: Positive for wound. Negative for rash.   Neurological: Negative for weakness.   Psychiatric/Behavioral: Negative for confusion.           Objective     Vital Signs  Temp:  [97.6 °F (36.4 °C)-98.2 °F (36.8 °C)] 97.9 °F (36.6 °C)  Heart Rate:  [68-78] 72  Resp:  [16-18] 16  BP: (137-153)/(71-89) 137/77    Physical Exam:     General Appearance:    Alert, cooperative, in no acute distress, obese   Head:    Normocephalic, without obvious abnormality, atraumatic   Eyes:            Lids and lashes normal, conjunctivae and sclerae normal, no   icterus, no pallor   Ears:    Ears appear intact with no abnormalities noted   Throat:   No oral lesions, no thrush, oral mucosa moist   Neck:   No adenopathy, supple, trachea midline   Lungs:     Clear to auscultation,respirations regular, even and          "         unlabored    Heart:    Regular rhythm and normal rate, normal S1 and S2, no            murmur, no gallop, no rub, no click   Chest Wall:    No abnormalities observed   Abdomen:     Bandage clean and dry; binder in place, mod pain   Extremities:   Moves all extremities well, no edema, no cyanosis, no             Redness   Pulses:   Pulses palpable and equal bilaterally   Skin:   Bilateral CVS chanes   Lymph nodes:   No palpable adenopathy   Neurologic:   Cranial nerves 2 - 12 grossly intact, sensation intact        Results Review:    Lab Results (last 24 hours)     Procedure Component Value Units Date/Time    POC Glucose Once [[id number]]  (Abnormal) Collected: 08/01/21 0717    Specimen: Blood Updated: 08/01/21 0718     Glucose 150 mg/dL      Comment: Serial Number: [id number]:  [id number]       Comprehensive Metabolic Panel [[id number]]  (Abnormal) Collected: 08/01/21 0340    Specimen: Blood Updated: 08/01/21 0453     Glucose 135 mg/dL      BUN 10 mg/dL      Creatinine 0.89 mg/dL      Sodium 135 mmol/L      Potassium 4.2 mmol/L      Chloride 101 mmol/L      CO2 25.0 mmol/L      Calcium 8.6 mg/dL      Total Protein 6.4 g/dL      Albumin 3.00 g/dL      ALT (SGPT) 18 U/L      AST (SGOT) 19 U/L      Alkaline Phosphatase 277 U/L      Total Bilirubin 0.6 mg/dL      eGFR Non African Amer 84 mL/min/1.73      Globulin 3.4 gm/dL      A/G Ratio 0.9 g/dL      BUN/Creatinine Ratio 11.2     Anion Gap 9.0 mmol/L     Narrative:      GFR Normal >60  Chronic Kidney Disease <60  Kidney Failure <15      CBC & Differential [[id number]]  (Abnormal) Collected: 08/01/21 0340    Specimen: Blood Updated: 08/01/21 0416    Narrative:      The following orders were created for panel order CBC & Differential.  Procedure                               Abnormality         Status                     ---------                               -----------         ------                     CBC Auto Differential[[id number]]        Abnormal             Final result                 Please view results for these tests on the individual orders.    CBC Auto Differential [[id number]]  (Abnormal) Collected: 08/01/21 0340    Specimen: Blood Updated: 08/01/21 0416     WBC 7.70 10*3/mm3      RBC 4.11 10*6/mm3      Hemoglobin 11.4 g/dL      Hematocrit 34.2 %      MCV 83.4 fL      MCH 27.8 pg      MCHC 33.3 g/dL      RDW 17.7 %      RDW-SD 50.3 fl      MPV 7.4 fL      Platelets 497 10*3/mm3      Neutrophil % 71.2 %      Lymphocyte % 14.6 %      Monocyte % 8.3 %      Eosinophil % 3.7 %      Basophil % 2.2 %      Neutrophils, Absolute 5.50 10*3/mm3      Lymphocytes, Absolute 1.10 10*3/mm3      Monocytes, Absolute 0.60 10*3/mm3      Eosinophils, Absolute 0.30 10*3/mm3      Basophils, Absolute 0.20 10*3/mm3      nRBC 0.0 /100 WBC     POC Glucose Once [[id number]]  (Abnormal) Collected: 07/31/21 2024    Specimen: Blood Updated: 07/31/21 2048     Glucose 160 mg/dL      Comment: Serial Number: [id number]:  [id number]       POC Glucose Once [[id number]]  (Abnormal) Collected: 07/31/21 1609    Specimen: Blood Updated: 07/31/21 1611     Glucose 138 mg/dL      Comment: Serial Number: [id number]:  [id number]              Imaging Results (Last 24 Hours)     ** No results found for the last 24 hours. **               I reviewed the patient's new clinical results.    Medication Review:   Scheduled Meds:aspirin, 81 mg, Oral, Daily  atorvastatin, 40 mg, Oral, QAM  budesonide, 0.5 mg, Nebulization, BID - RT  clopidogrel, 75 mg, Oral, QAM  docusate sodium, 100 mg, Oral, BID  enoxaparin, 40 mg, Subcutaneous, Once  furosemide, 20 mg, Oral, QAM  insulin lispro, 0-7 Units, Subcutaneous, TID AC  ipratropium-albuterol, 3 mL, Nebulization, Q6H - RT  losartan, 50 mg, Oral, QAM  metoprolol tartrate, 25 mg, Oral, Q12H  mirtazapine, 30 mg, Oral, Nightly  potassium chloride, 10 mEq, Oral, TID With Meals  pregabalin, 150 mg, Oral, BID  rOPINIRole, 0.5 mg, Oral, Nightly  traZODone, 100 mg,  Oral, Nightly  vancomycin, 750 mg, Intravenous, Q12H      Continuous Infusions:Pharmacy to dose vancomycin,   sodium chloride, 100 mL/hr, Last Rate: 100 mL/hr (07/30/21 0353)      PRN Meds:.•  acetaminophen **OR** acetaminophen  •  alteplase 2 mg vial + sterile water for injection  •  bisacodyl  •  calcium carbonate  •  dextrose  •  dextrose  •  docusate sodium  •  glucagon (human recombinant)  •  hydrALAZINE  •  HYDROcodone-acetaminophen  •  HYDROcodone-acetaminophen  •  HYDROmorphone  •  insulin lispro **AND** insulin lispro  •  ipratropium-albuterol  •  magnesium sulfate **OR** magnesium sulfate in D5W 1g/100mL (PREMIX)  •  melatonin  •  metoclopramide  •  ondansetron **OR** ondansetron  •  oxyCODONE  •  Pharmacy to dose vancomycin  •  potassium chloride **OR** potassium chloride **OR** potassium chloride  •  promethazine **OR** promethazine  •  [COMPLETED] Insert peripheral IV **AND** sodium chloride     Assessment/Plan       Cellulitis of abdominal wall  CT A&P shows RLQ enterocutaneous fistula  DM-II with complication of nephropathy, neuropahty  Immunocompromised status  CKD 3  Obesity  Panlobualr emphysema  Essential hypertension  Chronic narcotic dependecny  Restless Legs  Chronic hypoxemia  Cirrhosis  Diabetic neuropathy  Peripheral artery disease     POD 3 s/p exploration/I & D of abdominal wall abscess - continue antibiotics under the direction of Dr Mcdaniel.  Follow up cultures.  CT imaging shows RLQ enterocutaneous fistula-plan is to have further surgical debridement today with Dr Bhatia    Will need rehab    Plan for disposition:LEIGH ANN Patel, APRN  08/01/21  10:08 EDT

## 2021-08-01 NOTE — PROGRESS NOTES
LOS: 3 days   Patient Care Team:  Yusuf Jones MD as PCP - General  Joshua Yip MD as Consulting Physician (Cardiology)  Moise Watson MD as Consulting Physician (Cardiac Electrophysiology)  Izzy Alvarez MD as Consulting Physician (Nephrology)  Celine Swanson MD as Consulting Physician (Cardiology)        Subjective     Interval History:     Patient Complaints:   Patient Denies:  NV       Review of Systems:    pain    Objective     Vital Signs  Temp:  [97.2 °F (36.2 °C)-98.2 °F (36.8 °C)] 97.8 °F (36.6 °C)  Heart Rate:  [68-76] 71  Resp:  [12-18] 16  BP: (111-154)/(56-82) 125/71    Physical Exam:     General Appearance:    Alert, cooperative, in no acute distress   Head:    Normocephalic, without obvious abnormality, atraumatic   Eyes:            Lids and lashes normal, conjunctivae and sclerae normal, no   icterus, no pallor, corneas clear, PERRLA   Ears:    Ears appear intact with no abnormalities noted   Throat:   No oral lesions, no thrush, oral mucosa moist   Neck:   No adenopathy, supple, trachea midline, no thyromegaly, no   carotid bruit, no JVD   Back:     No kyphosis present, no scoliosis present, no skin lesions,      erythema or scars, no tenderness to percussion or                   palpation,   range of motion normal   Lungs:     Clear to auscultation,respirations regular, even and                  unlabored    Heart:    Regular rhythm and normal rate, normal S1 and S2, no            murmur, no gallop, no rub, no click   Chest Wall:    No abnormalities observed   Abdomen:     Normal bowel sounds, no masses, no organomegaly, soft        non-tender, non-distended, no guarding, no rebound                tenderness   Rectal:     Deferred   Extremities:   Moves all extremities well, no edema, no cyanosis, no             redness   Pulses:   Pulses palpable and equal bilaterally   Skin:   No bleeding, bruising or rash   Lymph nodes:   No palpable adenopathy    Neurologic:   Cranial nerves 2 - 12 grossly intact, sensation intact, DTR       present and equal bilaterally          Results Review:      Lab Results (last 72 hours)     Procedure Component Value Units Date/Time    Wound Culture - Wound, Abdominal Wall [[id number]] Collected: 07/29/21 1148    Specimen: Wound from Abdominal Wall Updated: 07/29/21 1309     Gram Stain Moderate (3+) WBCs per low power field      Few (2+) Gram positive cocci    Wound Culture - Wound, Abdominal Wall [[id number]] Collected: 07/29/21 1147    Specimen: Wound from Abdominal Wall Updated: 07/29/21 1304     Gram Stain Moderate (3+) WBCs per low power field      Moderate (3+) Gram positive cocci    Blood Culture - Blood, Arm, Right [[id number]] Collected: 07/27/21 1236    Specimen: Blood from Arm, Right Updated: 07/29/21 1300     Blood Culture No growth at 2 days    Blood Culture - Blood, Arm, Left [[id number]] Collected: 07/27/21 1236    Specimen: Blood from Arm, Left Updated: 07/29/21 1300     Blood Culture No growth at 2 days    POC Glucose Once [[id number]]  (Abnormal) Collected: 07/29/21 1227    Specimen: Blood Updated: 07/29/21 1228     Glucose 162 mg/dL      Comment: Serial Number: [id number]:  [id number]       Anaerobic Culture - Wound, Abdominal Wall [[id number]] Collected: 07/29/21 1148    Specimen: Wound from Abdominal Wall Updated: 07/29/21 1210    Anaerobic Culture - Wound, Abdominal Wall [[id number]] Collected: 07/29/21 1147    Specimen: Wound from Abdominal Wall Updated: 07/29/21 1201    Hemoglobin A1c [[id number]]  (Abnormal) Collected: 07/28/21 1555    Specimen: Blood Updated: 07/29/21 1022     Hemoglobin A1C 9.0 %     Narrative:      Hemoglobin A1C Reference Range:    <5.7 %        Normal  5.7-6.4 %     Increased risk for diabetes  > 6.4 %        Diabetes       These guidelines have been recommended by the American Diabetic Association for Hgb A1c.      The following 2010 guidelines have been recommended by the American  Diabetes Association for Hemoglobin A1c.    HBA1c 5.7-6.4% Increased risk for future diabetes (pre-diabetes)  HBA1c     >6.4% Diabetes      POC Glucose Once [[id number]]  (Abnormal) Collected: 07/29/21 1012    Specimen: Blood Updated: 07/29/21 1014     Glucose 169 mg/dL      Comment: Serial Number: [id number]:  [id number]       Basic Metabolic Panel [[id number]]  (Abnormal) Collected: 07/29/21 0759    Specimen: Blood Updated: 07/29/21 0827     Glucose 158 mg/dL      BUN 11 mg/dL      Creatinine 0.84 mg/dL      Sodium 134 mmol/L      Potassium 4.1 mmol/L      Comment: Slight hemolysis detected by analyzer. Results may be affected.        Chloride 99 mmol/L      CO2 25.0 mmol/L      Calcium 8.0 mg/dL      eGFR Non African Amer 89 mL/min/1.73      BUN/Creatinine Ratio 13.1     Anion Gap 10.0 mmol/L     Narrative:      GFR Normal >60  Chronic Kidney Disease <60  Kidney Failure <15      CBC & Differential [[id number]]  (Abnormal) Collected: 07/29/21 0759    Specimen: Blood Updated: 07/29/21 0805    Narrative:      The following orders were created for panel order CBC & Differential.  Procedure                               Abnormality         Status                     ---------                               -----------         ------                     CBC Auto Differential[[id number]]        Abnormal            Final result                 Please view results for these tests on the individual orders.    CBC Auto Differential [[id number]]  (Abnormal) Collected: 07/29/21 0759    Specimen: Blood Updated: 07/29/21 0805     WBC 9.20 10*3/mm3      RBC 4.09 10*6/mm3      Hemoglobin 11.1 g/dL      Hematocrit 33.0 %      MCV 80.7 fL      MCH 27.0 pg      MCHC 33.5 g/dL      RDW 17.2 %      RDW-SD 49.0 fl      MPV 7.1 fL      Platelets 441 10*3/mm3      Neutrophil % 79.4 %      Lymphocyte % 10.5 %      Monocyte % 6.5 %      Eosinophil % 3.0 %      Basophil % 0.6 %      Neutrophils, Absolute 7.30 10*3/mm3      Lymphocytes,  Absolute 1.00 10*3/mm3      Monocytes, Absolute 0.60 10*3/mm3      Eosinophils, Absolute 0.30 10*3/mm3      Basophils, Absolute 0.10 10*3/mm3      nRBC 0.3 /100 WBC     POC Glucose Once [[id number]]  (Abnormal) Collected: 07/29/21 0735    Specimen: Blood Updated: 07/29/21 0736     Glucose 176 mg/dL      Comment: Serial Number: [id number]:  [id number]       Wound Culture - Wound, Abdominal Wall [[id number]]  (Abnormal) Collected: 07/27/21 1234    Specimen: Wound from Abdominal Wall Updated: 07/29/21 0730     Wound Culture Scant growth (1+) Gram Positive Cocci     Gram Stain Many (4+) WBCs per low power field      Few (2+) Gram positive cocci, intracellular and extracellular    POC Glucose Once [[id number]]  (Abnormal) Collected: 07/28/21 1657    Specimen: Blood Updated: 07/28/21 1658     Glucose 185 mg/dL      Comment: Serial Number: [id number]:  [id number]       COVID PRE-OP / PRE-PROCEDURE SCREENING ORDER (NO ISOLATION) - Swab, Nasopharynx [[id number]]  (Normal) Collected: 07/28/21 0056    Specimen: Swab from Nasopharynx Updated: 07/28/21 1500    Narrative:      The following orders were created for panel order COVID PRE-OP / PRE-PROCEDURE SCREENING ORDER (NO ISOLATION) - Swab, Nasopharynx.  Procedure                               Abnormality         Status                     ---------                               -----------         ------                     COVID-19,APTIMA PANTHER,...[[id number]]  Normal              Final result                 Please view results for these tests on the individual orders.    COVID-19,APTIMA PANTHER,[facility] IN-[facility], NP/OP SWAB IN UTM/VTM/[facility] TRANSPORT MEDIA,24 HR TAT - Swab, Nasopharynx [[id number]]  (Normal) Collected: 07/28/21 0056    Specimen: Swab from Nasopharynx Updated: 07/28/21 1500     COVID19 Not Detected    Narrative:      Fact sheet for providers: https://www.fda.gov/media/482174/download     Fact sheet for patients:  https://www.fda.gov/media/719351/download    Test performed by RT PCR.    POC Glucose Once [[id number]]  (Abnormal) Collected: 07/28/21 1318    Specimen: Blood Updated: 07/28/21 1319     Glucose 200 mg/dL      Comment: Serial Number: [id number]:  [id number]       Vancomycin, Random [[id number]]  (Normal) Collected: 07/28/21 0536    Specimen: Blood Updated: 07/28/21 0611     Vancomycin Random 17.50 mcg/mL     Narrative:      Therapeutic Ranges for Vancomycin    Vancomycin Random   5.0-40.0 mcg/mL  Vancomycin Trough   5.0-20.0 mcg/mL  Vancomycin Peak     20.0-40.0 mcg/mL    Basic Metabolic Panel [[id number]]  (Abnormal) Collected: 07/28/21 0536    Specimen: Blood Updated: 07/28/21 0610     Glucose 145 mg/dL      BUN 11 mg/dL      Creatinine 0.93 mg/dL      Sodium 134 mmol/L      Potassium 4.2 mmol/L      Comment: Slight hemolysis detected by analyzer. Results may be affected.        Chloride 97 mmol/L      CO2 29.0 mmol/L      Calcium 8.4 mg/dL      eGFR Non African Amer 79 mL/min/1.73      BUN/Creatinine Ratio 11.8     Anion Gap 8.0 mmol/L     Narrative:      GFR Normal >60  Chronic Kidney Disease <60  Kidney Failure <15      CBC & Differential [[id number]]  (Abnormal) Collected: 07/28/21 0536    Specimen: Blood Updated: 07/28/21 0545    Narrative:      The following orders were created for panel order CBC & Differential.  Procedure                               Abnormality         Status                     ---------                               -----------         ------                     CBC Auto Differential[[id number]]        Abnormal            Final result                 Please view results for these tests on the individual orders.    CBC Auto Differential [[id number]]  (Abnormal) Collected: 07/28/21 0536    Specimen: Blood Updated: 07/28/21 0545     WBC 8.30 10*3/mm3      RBC 4.05 10*6/mm3      Hemoglobin 11.0 g/dL      Hematocrit 32.9 %      MCV 81.3 fL      MCH 27.1 pg      MCHC 33.3 g/dL      RDW 16.3  %      RDW-SD 46.4 fl      MPV 7.1 fL      Platelets 476 10*3/mm3      Neutrophil % 80.1 %      Lymphocyte % 9.7 %      Monocyte % 6.2 %      Eosinophil % 3.0 %      Basophil % 1.0 %      Neutrophils, Absolute 6.60 10*3/mm3      Lymphocytes, Absolute 0.80 10*3/mm3      Monocytes, Absolute 0.50 10*3/mm3      Eosinophils, Absolute 0.30 10*3/mm3      Basophils, Absolute 0.10 10*3/mm3      nRBC 0.0 /100 WBC     Comprehensive Metabolic Panel [[id number]]  (Abnormal) Collected: 07/27/21 1236    Specimen: Blood Updated: 07/27/21 1312     Glucose 127 mg/dL      BUN 11 mg/dL      Creatinine 1.02 mg/dL      Sodium 134 mmol/L      Potassium 4.5 mmol/L      Comment: Slight hemolysis detected by analyzer. Results may be affected.        Chloride 95 mmol/L      CO2 29.0 mmol/L      Calcium 9.0 mg/dL      Total Protein 6.7 g/dL      Albumin 2.80 g/dL      ALT (SGPT) 16 U/L      AST (SGOT) 26 U/L      Alkaline Phosphatase 170 U/L      Total Bilirubin 0.4 mg/dL      eGFR Non African Amer 71 mL/min/1.73      Globulin 3.9 gm/dL      A/G Ratio 0.7 g/dL      BUN/Creatinine Ratio 10.8     Anion Gap 10.0 mmol/L     Narrative:      GFR Normal >60  Chronic Kidney Disease <60  Kidney Failure <15      CK [[id number]]  (Abnormal) Collected: 07/27/21 1236    Specimen: Blood Updated: 07/27/21 1312     Creatine Kinase 19 U/L     Magnesium [[id number]]  (Normal) Collected: 07/27/21 1236    Specimen: Blood Updated: 07/27/21 1312     Magnesium 1.8 mg/dL     CBC & Differential [[id number]]  (Abnormal) Collected: 07/27/21 1236    Specimen: Blood Updated: 07/27/21 1257    Narrative:      The following orders were created for panel order CBC & Differential.  Procedure                               Abnormality         Status                     ---------                               -----------         ------                     CBC Auto Differential[[id number]]        Abnormal            Final result                 Please view results for these tests on  the individual orders.    CBC Auto Differential [[id number]]  (Abnormal) Collected: 07/27/21 1236    Specimen: Blood Updated: 07/27/21 1257     WBC 10.80 10*3/mm3      RBC 4.13 10*6/mm3      Hemoglobin 11.7 g/dL      Hematocrit 35.0 %      MCV 84.7 fL      MCH 28.3 pg      MCHC 33.4 g/dL      RDW 16.8 %      RDW-SD 48.6 fl      MPV 7.4 fL      Platelets 542 10*3/mm3      Neutrophil % 79.0 %      Lymphocyte % 11.0 %      Monocyte % 5.7 %      Eosinophil % 3.4 %      Basophil % 0.9 %      Neutrophils, Absolute 8.60 10*3/mm3      Lymphocytes, Absolute 1.20 10*3/mm3      Monocytes, Absolute 0.60 10*3/mm3      Eosinophils, Absolute 0.40 10*3/mm3      Basophils, Absolute 0.10 10*3/mm3      nRBC 0.1 /100 WBC           Imaging Results (Last 72 Hours)     Procedure Component Value Units Date/Time    CT Abdomen Pelvis With Contrast [[id number]] Collected: 07/31/21 0106     Updated: 07/31/21 0116    Narrative:      EXAMINATION: CT SCAN OF THE ABDOMEN AND PELVIS WITH INTRAVENOUS CONTRAST    DATE OF EXAM: 7/31/2021 12:41 AM    HISTORY: Right lower quadrant wound, evaluate for bowel involvement.    COMPARISON: None.    TECHNIQUE: CT examination of the abdomen and pelvis was performed following the intravenous administration of 100 mL  Isovue-370. CT dose lowering techniques were used, to include: automated exposure control, adjustment for patient size, and/or use of   iterative reconstruction.    FINDINGS:    ABDOMEN/PELVIS:    Lower Chest: Airspace disease of the right lung base that could be pneumonia    Liver: Normal.     Gallbladder/Biliary: Cholecystectomy    Pancreas: Normal.     Spleen: Normal.     Adrenal Glands: Normal.     Kidneys: Normal.     GI Tract: There is a large open mouth anterior abdominal wall hernia. It is covered with an overlying wound with extensive thinning of the skin. There is close approximation of the bowel to the anterior right lower quadrant. There are some bubbles of gas   tracking around the wound  and there is some hyperdense material within the subcutaneous tissues of the right lower quadrant. Findings suggest enterocutaneous fistula. The exact site of enteric leakage is not identified. No obstruction is seen.    Mesentery/Peritoneum: Normal.    Vasculature: Normal.     Lymph Nodes: Normal.     Abdominal Wall: Normal.     Bladder: Normal.     Reproductive: Normal.     Musculoskeletal: Normal.        Impression:        1.  Right lower quadrant wound covering an anterior abdominal wall hernia. 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 This could be oral contrast.  2.  Patchy airspace disease of the right lung base that could be pneumonia.    Electronically signed by:  Zechariah Handley M.D.    7/30/2021 11:15 PM            Medication Review:     Hospital Medications (active)       Dose Frequency Start End    alteplase (CATHFLO/ACTIVASE) 2 mg in sterile water (preservative free) 2.2 mL injection ([MAR Hold] since 7/29/2021  9:56 AM) 2 mg As Needed 7/29/2021     Admin Instructions: After Administration Follow Nursing Orders for Reassessment of Central Line<BR>If alteplase induced angioedema is suspected, activate the TPA induced angioedema order set. These symptoms include orolingual, hemifacial swelling, often contralateral to ischemic cerebral hemisphere. Onset may range from 5 - 180 minutes following infusion initiation.    Route: Intracatheter    aspirin EC tablet 81 mg ([MAR Hold] since 7/29/2021  9:56 AM) 81 mg Daily 7/27/2021     Admin Instructions: Herbal/drug interaction: Avoid use with ginkgo biloba. Do not crush or chew.<BR>Do not exceed 4 grams of aspirin in a 24 hr period.<BR><BR>If given for pain, use the following pain scale: <BR>Mild Pain = Pain Score of 1-3, CPOT 1-2<BR>Moderate Pain = Pain Score of 4-6, CPOT 3-4<BR>Severe Pain = Pain Score of  7-10, CPOT 5-8    Route: Oral    atorvastatin (LIPITOR) tablet 40 mg ([MAR Hold] since 7/29/2021  9:56 AM) 40 mg Every Morning 7/28/2021     Admin Instructions: Avoid grapefruit juice.    Route: Oral    budesonide (PULMICORT) nebulizer solution 0.5 mg ([MAR Hold] since 7/29/2021  9:56 AM) 0.5 mg 2 Times Daily - RT 7/27/2021     Admin Instructions: Do not shake.  Protect from light.    Route: Nebulization    clopidogrel (PLAVIX) tablet 75 mg ([MAR Hold] since 7/29/2021  9:56 AM) 75 mg Every Morning 7/28/2021     Route: Oral    dexamethasone (DECADRON) injection  As Needed 7/29/2021     Route: Intravenous    dextrose (D50W) 25 g/ 50mL Intravenous Solution 25 g ([MAR Hold] since 7/29/2021  9:56 AM) 25 g Every 15 Minutes PRN 7/28/2021     Admin Instructions: Blood sugar less than 70; patient has IV access - Unresponsive, NPO or Unable To Safely Swallow    Route: Intravenous    dextrose (GLUTOSE) oral gel 15 g ([MAR Hold] since 7/29/2021  9:56 AM) 15 g Every 15 Minutes PRN 7/28/2021     Admin Instructions: BS<70, Patient Alert, Is not NPO, Can safely swallow.    Route: Oral    fentaNYL citrate (PF) (SUBLIMAZE) injection  As Needed 7/29/2021     Route: Intravenous    furosemide (LASIX) tablet 20 mg ([MAR Hold] since 7/29/2021  9:56 AM) 20 mg Every Morning 7/28/2021     Route: Oral    glucagon (human recombinant) (GLUCAGEN DIAGNOSTIC) injection 1 mg ([MAR Hold] since 7/29/2021  9:56 AM) 1 mg As Needed 7/28/2021     Admin Instructions: Blood Glucose Less Than 70 - Patient Without IV Access - Unresponsive, NPO or Unable To Safely Swallow    Route: Subcutaneous    HYDROmorphone (DILAUDID) injection 0.5 mg ([MAR Hold] since 7/29/2021  9:56 AM) 0.5 mg Every 4 Hours PRN 7/27/2021     Admin Instructions: <BR><BR>Caution: Look alike/sound alike drug alert<BR><BR>If given for pain, use the following pain scale:<BR>Mild Pain = Pain Score of 1-3, CPOT 1-2<BR>Moderate Pain = Pain Score of 4-6, CPOT 3-4<BR>Severe Pain = Pain Score of  "7-10, CPOT 5-8    Route: Intravenous    insulin lispro (ADMELOG) injection 0-7 Units ([MAR Hold] since 7/29/2021  9:56 AM) 0-7 Units 3 Times Daily Before Meals 7/28/2021     Admin Instructions: Do not hold even if patient is NPO.<BR><BR>Correction - Low Dose.  Less than 40 units/day total insulin dose or lean, elderly, renal patients<BR><BR>Blood glucose 150-199 mg/dL - 2 units<BR>Blood glucose 200-249 mg/dL - 3 units<BR>Blood glucose 250-299 mg/dL - 4 units<BR>Blood glucose 300-349 mg/dL - 5 units<BR>Blood glucose 350-400 mg/dL - 6 units<BR>Blood glucose greater than 400 mg/dL - 7 units and repeat glucose test in 2 hours, also re-apply sliding scale insulin. IF repeat blood glucose is 400 mg/dL or less, resume ordered blood glucose testing. If repeat blood glucose is greater than 400 mg/dL, give 7 units and call MD. If blood glucose is less than 70 mg/dL, use hypoglycemia protocol.<BR> Caution: Look alike/sound alike drug alert    Route: Subcutaneous    Linked Group 1: \"And\" Linked Group Details        insulin lispro (ADMELOG) injection 0-7 Units ([MAR Hold] since 7/29/2021  9:56 AM) 0-7 Units As Needed 7/28/2021     Admin Instructions: This order is for all repeat blood glucose readings in 2 hours (post blood glucose  greater than 400 mg/dL) give 7 units and call MD. If the repeat blood glucose is 400 mg/dL or less, resume ordered blood glucose testing. If the repeat blood glucose is greater than 400 mg/dL, give 7 units and call MD.<BR> Caution: Look alike/sound alike drug alert    Route: Subcutaneous    Linked Group 1: \"And\" Linked Group Details        ipratropium-albuterol (DUO-NEB) nebulizer solution 3 mL ([MAR Hold] since 7/29/2021  9:56 AM) 3 mL Every 6 Hours - RT 7/27/2021     Route: Nebulization    lidocaine PF 2% (XYLOCAINE) injection  As Needed 7/29/2021     Route: Intravenous    losartan (COZAAR) tablet 50 mg 50 mg Every Morning 7/28/2021     Route: Oral    metoprolol tartrate (LOPRESSOR) tablet 25 mg 25 " "mg Every 12 Hours Scheduled 7/27/2021     Route: Oral    mirtazapine (REMERON) tablet 30 mg ([MAR Hold] since 7/29/2021  9:56 AM) 30 mg Nightly 7/27/2021     Route: Oral    ondansetron (ZOFRAN) injection  As Needed 7/29/2021     Route: Intravenous    oxyCODONE (ROXICODONE) immediate release tablet 10 mg ([MAR Hold] since 7/29/2021  9:56 AM) 10 mg Every 4 Hours PRN 7/27/2021     Admin Instructions: <BR><BR>If given for pain, use the following pain scale:<BR>Mild Pain = Pain Score of 1-3, CPOT 1-2<BR>Moderate Pain = Pain Score of 4-6, CPOT 3-4<BR>Severe Pain = Pain Score of 7-10, CPOT 5-8    Route: Oral    Pharmacy to dose vancomycin  Continuous PRN 7/27/2021 7/31/2021    Route: Does not apply    Cosign for Ordering: Accepted by Yusuf Jones MD on 7/28/2021  6:47 AM    phenylephrine (MARILUZ-SYNEPHRINE) 1 MG/10ML injection  As Needed 7/29/2021     Route: Intravenous    potassium chloride (K-DUR,KLOR-CON) ER tablet 10 mEq 10 mEq 3 Times Daily With Meals 7/27/2021     Admin Instructions: Swallow whole; do not crush, split, or chew.    Route: Oral    pregabalin (LYRICA) capsule 150 mg 150 mg 2 Times Daily 7/27/2021     Admin Instructions:     Route: Oral    Propofol (DIPRIVAN) injection  As Needed 7/29/2021     Route: Intravenous    rocuronium (ZEMURON) injection  As Needed 7/29/2021     Route: Intravenous    rOPINIRole (REQUIP) tablet 0.5 mg ([MAR Hold] since 7/29/2021  9:56 AM) 0.5 mg Nightly 7/27/2021     Admin Instructions: Caution: Look alike/sound alike drug alert    Route: Oral    sodium chloride 0.9 % flush 10 mL ([MAR Hold] since 7/29/2021  9:56 AM) 10 mL As Needed 7/27/2021     Route: Intravenous    Linked Group 2: \"And\" Linked Group Details        sodium chloride 0.9 % infusion  Continuous PRN 7/29/2021     Route: Intravenous    sugammadex (BRIDION) injection  As Needed 7/29/2021     Route: Intravenous    traZODone (DESYREL) tablet 100 mg ([MAR Hold] since 7/29/2021  9:56 AM) 100 mg Nightly 7/27/2021     Admin " Instructions: Take with food.<BR>Caution: Look alike/sound alike drug alert    Route: Oral    vancomycin (VANCOCIN) 1,000 mg in sodium chloride 0.9 % 250 mL IVPB 1,000 mg Every 12 Hours 7/28/2021 8/25/2021    Route: Intravenous        aspirin, 81 mg, Oral, Daily  atorvastatin, 40 mg, Oral, QAM  budesonide, 0.5 mg, Nebulization, BID - RT  clopidogrel, 75 mg, Oral, QAM  docusate sodium, 100 mg, Oral, BID  furosemide, 20 mg, Oral, QAM  insulin lispro, 0-7 Units, Subcutaneous, TID AC  ipratropium-albuterol, 3 mL, Nebulization, Q6H - RT  losartan, 50 mg, Oral, QAM  metoprolol tartrate, 25 mg, Oral, Q12H  mirtazapine, 30 mg, Oral, Nightly  potassium chloride, 10 mEq, Oral, TID With Meals  pregabalin, 150 mg, Oral, BID  rOPINIRole, 0.5 mg, Oral, Nightly  traZODone, 100 mg, Oral, Nightly  vancomycin, 750 mg, Intravenous, Q12H        Assessment/Plan         Cellulitis of abdominal wall  COPD  ARTI  Pacemaker  H/o prostate cancer  CAD with h/o stent  H/o CHF  Hx Bacteremia  MRSA  Echo and EMBER - noted   Amputation of the fifth metatarsal   abd wall abscess   S/p I&D   MRSA Bacteremia     Plan :     IV vanc till 8/20/21  Wound and BC  C&S  mrsa  Local care  Will follow  Thank you              Gabo Mcdaniel MD  08/01/21  16:48 EDT

## 2021-08-01 NOTE — OP NOTE
DEBRIDEMENT WOUND  Procedure Report    Patient Name:  Ro Nathan  YOB: 1945    Date of Surgery:  8/1/2021     Indications: Necrotic abdominal wall    Pre-op Diagnosis:   Same       Post-Op Diagnosis Codes:  Same    Procedure/CPT® Codes:      Procedure(s):  DEBRIDEMENT OF ABDOMINAL WALL, sharp excisional debridement of skin subcutaneous tissue of abdominal wall measuring 6 x 8 inches    Staff:  Surgeon(s):  Hill Bhatia DO         Anesthesia: General    Anesthetist: Dr. Atkinson    Estimated Blood Loss: minimal    Implants:    Nothing was implanted during the procedure    Specimen:          None        Findings: Necrotic skin and subcu abdominal wall    Complications: None    Description of Procedure: Mr. Nathan is a pleasant morbidly obese 75-year-old male patient of Dr. Pedro.  Earlier in the week he underwent debridement of some necrotic skin overlying and ventral hernia.  He is.  He has developed further necrosis of some skin and subcutaneous tissues and we recommended that he be taken back to the operating room for a second debridement.  He understands agrees and is for that reason presents.      Patient was taken operating room placed in the supine position.  General was done by Dr. Atkinson.  Timeout done identity verified.  His abdomen was prepped with Betadine and draped after appropriate dry time.  Necrotic skin and subcutaneous tissue was then sharply excised using Metzenbaum scissor dissection.  He has small bowel exposed.  This is then covered with Xeroform gauze 2 layers and then covered with fluffs and ABD and tape.  He tolerated the procedure well awakened and transferred to recovery in satisfactory condition.  Final sponge instrument needle counts were correct.       Hill Bhatia DO     Date: 8/1/2021  Time: 12:55 EDT

## 2021-08-01 NOTE — ANESTHESIA PROCEDURE NOTES
Airway  Urgency: elective    Date/Time: 8/1/2021 12:33 PM  Airway not difficult    General Information and Staff    Patient location during procedure: OR  Anesthesiologist: Aldo Vazquez MD    Indications and Patient Condition  Indications for airway management: airway protection    Preoxygenated: yes  MILS not maintained throughout  Mask difficulty assessment: 0 - not attempted    Final Airway Details  Final airway type: endotracheal airway      Successful airway: ETT  Cuffed: yes   Successful intubation technique: direct laryngoscopy  Endotracheal tube insertion site: oral  Blade: Grace  Blade size: 4  ETT size (mm): 7.5  Cormack-Lehane Classification: grade IIa - partial view of glottis  Placement verified by: chest auscultation and capnometry   Measured from: gums  ETT/EBT to gums (cm): 22  Number of attempts at approach: 1  Assessment: lips, teeth, and gum same as pre-op and atraumatic intubation

## 2021-08-01 NOTE — PLAN OF CARE
Goal Outcome Evaluation:         Pt returned from PACU without any issues. Patient is alert, pleasant, and cooperative with care. Patient requests routine pain medication for chronic back pain. Resting at this time, able to make needs known.

## 2021-08-02 LAB
ALBUMIN SERPL-MCNC: 2.7 G/DL (ref 3.5–5.2)
ALBUMIN/GLOB SERPL: 0.9 G/DL
ALP SERPL-CCNC: 243 U/L (ref 39–117)
ALT SERPL W P-5'-P-CCNC: 13 U/L (ref 1–41)
ANION GAP SERPL CALCULATED.3IONS-SCNC: 6 MMOL/L (ref 5–15)
ANISOCYTOSIS BLD QL: ABNORMAL
AST SERPL-CCNC: 12 U/L (ref 1–40)
BILIRUB SERPL-MCNC: 0.5 MG/DL (ref 0–1.2)
BUN SERPL-MCNC: 8 MG/DL (ref 8–23)
BUN/CREAT SERPL: 9.5 (ref 7–25)
CALCIUM SPEC-SCNC: 8.4 MG/DL (ref 8.6–10.5)
CHLORIDE SERPL-SCNC: 101 MMOL/L (ref 98–107)
CO2 SERPL-SCNC: 26 MMOL/L (ref 22–29)
CREAT SERPL-MCNC: 0.84 MG/DL (ref 0.76–1.27)
DEPRECATED RDW RBC AUTO: 50.8 FL (ref 37–54)
ELLIPTOCYTES BLD QL SMEAR: ABNORMAL
EOSINOPHIL # BLD MANUAL: 0.13 10*3/MM3 (ref 0–0.4)
EOSINOPHIL NFR BLD MANUAL: 2 % (ref 0.3–6.2)
ERYTHROCYTE [DISTWIDTH] IN BLOOD BY AUTOMATED COUNT: 17.6 % (ref 12.3–15.4)
GFR SERPL CREATININE-BSD FRML MDRD: 89 ML/MIN/1.73
GLOBULIN UR ELPH-MCNC: 3 GM/DL
GLUCOSE BLDC GLUCOMTR-MCNC: 132 MG/DL (ref 70–105)
GLUCOSE BLDC GLUCOMTR-MCNC: 145 MG/DL (ref 70–105)
GLUCOSE SERPL-MCNC: 146 MG/DL (ref 65–99)
HCT VFR BLD AUTO: 30.2 % (ref 37.5–51)
HGB BLD-MCNC: 10.3 G/DL (ref 13–17.7)
LYMPHOCYTES # BLD MANUAL: 0.66 10*3/MM3 (ref 0.7–3.1)
LYMPHOCYTES NFR BLD MANUAL: 10 % (ref 19.6–45.3)
LYMPHOCYTES NFR BLD MANUAL: 7 % (ref 5–12)
MCH RBC QN AUTO: 27.7 PG (ref 26.6–33)
MCHC RBC AUTO-ENTMCNC: 34 G/DL (ref 31.5–35.7)
MCV RBC AUTO: 81.4 FL (ref 79–97)
MONOCYTES # BLD AUTO: 0.46 10*3/MM3 (ref 0.1–0.9)
NEUTROPHILS # BLD AUTO: 5.35 10*3/MM3 (ref 1.7–7)
NEUTROPHILS NFR BLD MANUAL: 76 % (ref 42.7–76)
NEUTS BAND NFR BLD MANUAL: 5 % (ref 0–5)
NEUTS VAC BLD QL SMEAR: ABNORMAL
PLAT MORPH BLD: NORMAL
PLATELET # BLD AUTO: 428 10*3/MM3 (ref 140–450)
PMV BLD AUTO: 7 FL (ref 6–12)
POIKILOCYTOSIS BLD QL SMEAR: ABNORMAL
POTASSIUM SERPL-SCNC: 4.4 MMOL/L (ref 3.5–5.2)
PROT SERPL-MCNC: 5.7 G/DL (ref 6–8.5)
RBC # BLD AUTO: 3.72 10*6/MM3 (ref 4.14–5.8)
SCAN SLIDE: NORMAL
SODIUM SERPL-SCNC: 133 MMOL/L (ref 136–145)
VANCOMYCIN PEAK SERPL-MCNC: 20.5 MCG/ML (ref 20–40)
VANCOMYCIN TROUGH SERPL-MCNC: 14.7 MCG/ML (ref 5–20)
VANCOMYCIN TROUGH SERPL-MCNC: 20.4 MCG/ML (ref 5–20)
WBC # BLD AUTO: 6.6 10*3/MM3 (ref 3.4–10.8)

## 2021-08-02 PROCEDURE — 82962 GLUCOSE BLOOD TEST: CPT

## 2021-08-02 PROCEDURE — 80053 COMPREHEN METABOLIC PANEL: CPT | Performed by: SURGERY

## 2021-08-02 PROCEDURE — 94799 UNLISTED PULMONARY SVC/PX: CPT

## 2021-08-02 PROCEDURE — 85025 COMPLETE CBC W/AUTO DIFF WBC: CPT | Performed by: SURGERY

## 2021-08-02 PROCEDURE — 80202 ASSAY OF VANCOMYCIN: CPT | Performed by: FAMILY MEDICINE

## 2021-08-02 PROCEDURE — 80202 ASSAY OF VANCOMYCIN: CPT | Performed by: INTERNAL MEDICINE

## 2021-08-02 PROCEDURE — 25010000002 HYDROMORPHONE PER 4 MG: Performed by: SURGERY

## 2021-08-02 PROCEDURE — 25010000002 VANCOMYCIN 750 MG RECONSTITUTED SOLUTION 1 EACH VIAL: Performed by: SURGERY

## 2021-08-02 PROCEDURE — 63710000001 INSULIN LISPRO (HUMAN) PER 5 UNITS: Performed by: SURGERY

## 2021-08-02 PROCEDURE — 85007 BL SMEAR W/DIFF WBC COUNT: CPT | Performed by: SURGERY

## 2021-08-02 RX ADMIN — HYDROMORPHONE HYDROCHLORIDE 0.5 MG: 2 INJECTION, SOLUTION INTRAMUSCULAR; INTRAVENOUS; SUBCUTANEOUS at 12:16

## 2021-08-02 RX ADMIN — POTASSIUM CHLORIDE 10 MEQ: 750 TABLET, EXTENDED RELEASE ORAL at 08:12

## 2021-08-02 RX ADMIN — HYDROMORPHONE HYDROCHLORIDE 0.5 MG: 2 INJECTION, SOLUTION INTRAMUSCULAR; INTRAVENOUS; SUBCUTANEOUS at 19:28

## 2021-08-02 RX ADMIN — HYDROMORPHONE HYDROCHLORIDE 0.5 MG: 2 INJECTION, SOLUTION INTRAMUSCULAR; INTRAVENOUS; SUBCUTANEOUS at 03:40

## 2021-08-02 RX ADMIN — HYDROMORPHONE HYDROCHLORIDE 0.5 MG: 2 INJECTION, SOLUTION INTRAMUSCULAR; INTRAVENOUS; SUBCUTANEOUS at 07:01

## 2021-08-02 RX ADMIN — HYDROMORPHONE HYDROCHLORIDE 0.5 MG: 2 INJECTION, SOLUTION INTRAMUSCULAR; INTRAVENOUS; SUBCUTANEOUS at 14:20

## 2021-08-02 RX ADMIN — HYDROMORPHONE HYDROCHLORIDE 0.5 MG: 2 INJECTION, SOLUTION INTRAMUSCULAR; INTRAVENOUS; SUBCUTANEOUS at 16:51

## 2021-08-02 RX ADMIN — ROPINIROLE HYDROCHLORIDE 0.5 MG: 0.25 TABLET, FILM COATED ORAL at 21:56

## 2021-08-02 RX ADMIN — VANCOMYCIN HYDROCHLORIDE 750 MG: 750 INJECTION, POWDER, LYOPHILIZED, FOR SOLUTION INTRAVENOUS at 11:15

## 2021-08-02 RX ADMIN — DOCUSATE SODIUM 100 MG: 100 CAPSULE, LIQUID FILLED ORAL at 08:12

## 2021-08-02 RX ADMIN — PREGABALIN 150 MG: 75 CAPSULE ORAL at 21:56

## 2021-08-02 RX ADMIN — HYDROMORPHONE HYDROCHLORIDE 0.5 MG: 2 INJECTION, SOLUTION INTRAMUSCULAR; INTRAVENOUS; SUBCUTANEOUS at 21:59

## 2021-08-02 RX ADMIN — POTASSIUM CHLORIDE 10 MEQ: 750 TABLET, EXTENDED RELEASE ORAL at 18:12

## 2021-08-02 RX ADMIN — SODIUM CHLORIDE 100 ML/HR: 9 INJECTION, SOLUTION INTRAVENOUS at 18:12

## 2021-08-02 RX ADMIN — HYDROMORPHONE HYDROCHLORIDE 0.5 MG: 2 INJECTION, SOLUTION INTRAMUSCULAR; INTRAVENOUS; SUBCUTANEOUS at 09:58

## 2021-08-02 RX ADMIN — IPRATROPIUM BROMIDE AND ALBUTEROL SULFATE 3 ML: 2.5; .5 SOLUTION RESPIRATORY (INHALATION) at 23:11

## 2021-08-02 RX ADMIN — PREGABALIN 150 MG: 75 CAPSULE ORAL at 08:11

## 2021-08-02 RX ADMIN — LOSARTAN POTASSIUM 50 MG: 50 TABLET, FILM COATED ORAL at 08:12

## 2021-08-02 RX ADMIN — METOPROLOL TARTRATE 25 MG: 25 TABLET, FILM COATED ORAL at 21:56

## 2021-08-02 RX ADMIN — ASPIRIN 81 MG: 81 TABLET, COATED ORAL at 08:12

## 2021-08-02 RX ADMIN — SODIUM CHLORIDE 100 ML/HR: 9 INJECTION, SOLUTION INTRAVENOUS at 07:02

## 2021-08-02 RX ADMIN — INSULIN LISPRO 3 UNITS: 100 INJECTION, SOLUTION INTRAVENOUS; SUBCUTANEOUS at 16:51

## 2021-08-02 RX ADMIN — BUDESONIDE 0.5 MG: 0.5 SUSPENSION RESPIRATORY (INHALATION) at 20:35

## 2021-08-02 RX ADMIN — TRAZODONE HYDROCHLORIDE 100 MG: 100 TABLET ORAL at 21:56

## 2021-08-02 RX ADMIN — ATORVASTATIN CALCIUM 40 MG: 40 TABLET, FILM COATED ORAL at 08:12

## 2021-08-02 RX ADMIN — MIRTAZAPINE 30 MG: 15 TABLET, FILM COATED ORAL at 21:56

## 2021-08-02 RX ADMIN — IPRATROPIUM BROMIDE AND ALBUTEROL SULFATE 3 ML: 2.5; .5 SOLUTION RESPIRATORY (INHALATION) at 11:54

## 2021-08-02 RX ADMIN — Medication 10 ML: at 19:28

## 2021-08-02 RX ADMIN — FUROSEMIDE 20 MG: 20 TABLET ORAL at 08:12

## 2021-08-02 RX ADMIN — CLOPIDOGREL BISULFATE 75 MG: 75 TABLET ORAL at 08:12

## 2021-08-02 RX ADMIN — DOCUSATE SODIUM 100 MG: 100 CAPSULE, LIQUID FILLED ORAL at 21:56

## 2021-08-02 RX ADMIN — HYDROMORPHONE HYDROCHLORIDE 0.5 MG: 2 INJECTION, SOLUTION INTRAMUSCULAR; INTRAVENOUS; SUBCUTANEOUS at 01:37

## 2021-08-02 RX ADMIN — INSULIN LISPRO 2 UNITS: 100 INJECTION, SOLUTION INTRAVENOUS; SUBCUTANEOUS at 13:02

## 2021-08-02 RX ADMIN — IPRATROPIUM BROMIDE AND ALBUTEROL SULFATE 3 ML: 2.5; .5 SOLUTION RESPIRATORY (INHALATION) at 20:30

## 2021-08-02 RX ADMIN — CALCIUM CARBONATE (ANTACID) CHEW TAB 500 MG 2 TABLET: 500 CHEW TAB at 11:15

## 2021-08-02 RX ADMIN — POTASSIUM CHLORIDE 10 MEQ: 750 TABLET, EXTENDED RELEASE ORAL at 11:15

## 2021-08-02 RX ADMIN — BUDESONIDE 0.5 MG: 0.5 SUSPENSION RESPIRATORY (INHALATION) at 11:58

## 2021-08-02 RX ADMIN — METOPROLOL TARTRATE 25 MG: 25 TABLET, FILM COATED ORAL at 08:11

## 2021-08-02 NOTE — PROGRESS NOTES
"Pharmacy Antimicrobial Dosing Service    Subjective:  Ro Nathan is a 75 y.o.male admitted with abdominal wall cellulitis. Pharmacy has been consulted to dose Vancomycin for possible sepsis and continuation of outpatient regimen. Patient recently admitted with MRSA bacteremia and discharged 3 days prior to current admission on vancomycin. Vancomycin to continue until 8/25 per ID recommendation last admission. Per Surgery, likely source of bacteremia is incarcerated ventral hernia with soft tissue necrosis of abdominal wall.     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 Day #7 Vancomycin (this admission): Goal -600 mcg*h/mL. Regimen changed from 1000 mg (~10 mg/kg DBW) IV q12h to current 750 mg (~7.5 mg/kg DBW) IV q12h on 7/30 d/t supratherapeutic AUC (682). Repeat levels today. Peak = 20.5 mcg/mL (~4 hrs post dose). Trough = 14.7 mg/mL (~10.5 hrs post dose). Calculated , half-life ~13 hrs. Based on therapeutic levels, will continue current regimen. Repeat trough ordered for 8/4 at 1000.     Will continue to monitor drug levels, renal function, culture and sensitivities, and patient clinical status.       Objective:  Relevant clinical data and objective history reviewed:  185.4 cm (73\")   124 kg (273 lb 5.9 oz)   Ideal body weight: 79.9 kg (176 lb 2.4 oz)  Adjusted ideal body weight: 97.5 kg (215 lb 0.6 oz)  Body mass index is 36.07 kg/m².    Results from last 7 days   Lab Units 08/02/21  0959 08/02/21  0338 07/30/21  0710 07/30/21  0142 07/28/21  0536   VANCOMYCIN PK mcg/mL  --  20.50  --  29.30  --    VANCOMYCIN TR mcg/mL 14.70 20.40* 18.10  --   --    VANCOMYCIN RM mcg/mL  --   --   --   --  17.50     Results from last 7 days   Lab Units 08/02/21  0338 08/01/21  0340 07/31/21  0245   CREATININE mg/dL 0.84 0.89 0.95     Estimated Creatinine Clearance: 106.4 mL/min (by C-G formula based on SCr of " 0.84 mg/dL).  I/O last 3 completed shifts:  In: 1310 [P.O.:960; I.V.:350]  Out: 3300 [Urine:3300]    Results from last 7 days   Lab Units 08/02/21  0338 08/01/21  0340 07/31/21  0245   WBC 10*3/mm3 6.60 7.70 8.00     Temperature    08/02/21 0100 08/02/21 0500 08/02/21 0734   Temp: 97.5 °F (36.4 °C) 98.2 °F (36.8 °C) 97.2 °F (36.2 °C)     Baseline culture/source/susceptibility:  Microbiology Results (last 10 days)       Procedure Component Value - Date/Time    Tissue / Bone Culture - Tissue, Abdominal Wall [[id number]] Collected: 08/01/21 1313    Lab Status: Preliminary result Specimen: Tissue from Abdominal Wall Updated: 08/02/21 0654     Gram Stain Many (4+) WBCs per low power field      Many (4+) Gram positive cocci in clusters    Anaerobic Culture - Wound, Abdominal Wall [[id number]] Collected: 07/29/21 1148    Lab Status: Preliminary result Specimen: Wound from Abdominal Wall Updated: 08/01/21 1456     Anaerobic Culture No anaerobes isolated at 3 days    Wound Culture - Wound, Abdominal Wall [[id number]]  (Abnormal) Collected: 07/29/21 1148    Lab Status: Final result Specimen: Wound from Abdominal Wall Updated: 07/31/21 0723     Wound Culture Moderate growth (3+) Staphylococcus aureus, MRSA     Comment: Methicillin resistant Staphylococcus aureus, Patient may be an isolation risk.        Gram Stain Moderate (3+) WBCs per low power field      Few (2+) Gram positive cocci    Narrative:      Refer to previous wound culture collected on  07/27/21 for 12:34 MICs.    Anaerobic Culture - Wound, Abdominal Wall [[id number]] Collected: 07/29/21 1147    Lab Status: Preliminary result Specimen: Wound from Abdominal Wall Updated: 08/01/21 1456     Anaerobic Culture No anaerobes isolated at 3 days    Wound Culture - Wound, Abdominal Wall [[id number]]  (Abnormal) Collected: 07/29/21 1147    Lab Status: Final result Specimen: Wound from Abdominal Wall Updated: 07/31/21 0723     Wound Culture Moderate growth (3+) Staphylococcus  aureus, MRSA     Comment: Methicillin resistant Staphylococcus aureus, Patient may be an isolation risk.        Gram Stain Moderate (3+) WBCs per low power field      Moderate (3+) Gram positive cocci    Narrative:      Refer to previous wound culture collected on  07/27/21 for 12:34 MICs.    COVID PRE-OP / PRE-PROCEDURE SCREENING ORDER (NO ISOLATION) - Swab, Nasopharynx [[id number]]  (Normal) Collected: 07/28/21 0056    Lab Status: Final result Specimen: Swab from Nasopharynx Updated: 07/28/21 1500    Narrative:      The following orders were created for panel order COVID PRE-OP / PRE-PROCEDURE SCREENING ORDER (NO ISOLATION) - Swab, Nasopharynx.  Procedure                               Abnormality         Status                     ---------                               -----------         ------                     COVID-19,APTIMA PANTHER,...[[id number]]  Normal              Final result                 Please view results for these tests on the individual orders.    COVID-19,APTIMA PANTHER,[facility], NP/OP SWAB IN UTM/VTM/[facility] TRANSPORT MEDIA,24 HR TAT - Swab, Nasopharynx [[id number]]  (Normal) Collected: 07/28/21 0056    Lab Status: Final result Specimen: Swab from Nasopharynx Updated: 07/28/21 1500     COVID19 Not Detected    Narrative:      Fact sheet for providers: https://www.fda.gov/media/941748/download     Fact sheet for patients: https://www.fda.gov/media/931939/download    Test performed by RT PCR.    Blood Culture - Blood, Arm, Right [[id number]] Collected: 07/27/21 1236    Lab Status: Final result Specimen: Blood from Arm, Right Updated: 08/01/21 1300     Blood Culture No growth at 5 days    Blood Culture - Blood, Arm, Left [[id number]] Collected: 07/27/21 1236    Lab Status: Final result Specimen: Blood from Arm, Left Updated: 08/01/21 1300     Blood Culture No growth at 5 days    Wound Culture - Wound, Abdominal Wall [[id number]]  (Abnormal)  (Susceptibility) Collected: 07/27/21 1234    Lab Status:  Final result Specimen: Wound from Abdominal Wall Updated: 07/30/21 1056     Wound Culture Scant growth (1+) Staphylococcus aureus, MRSA     Gram Stain Many (4+) WBCs per low power field      Few (2+) Gram positive cocci, intracellular and extracellular    Susceptibility        Staphylococcus aureus, MRSA      YASMIN      Clindamycin Resistant      Erythromycin Resistant      Inducible Clindamycin Resistance Negative      Oxacillin Resistant      Penicillin G Resistant      Rifampin Susceptible      Tetracycline Susceptible      Trimethoprim + Sulfamethoxazole Susceptible      Vancomycin Susceptible                 Linear View                               Anti-Infectives (From admission, onward)      Ordered     Dose/Rate Route Frequency Start Stop    07/30/21 1144  vancomycin 750 mg in sodium chloride 0.9 % 250 mL IVPB     Kizzy Navarro PharmD reviewed the order on 08/01/21 1443.   Ordering Provider: Hill Bhatia DO    750 mg  over 45 Minutes Intravenous Every 12 Hours 07/30/21 2300 08/25/21 2259    07/29/21 1005  ceFAZolin (ANCEF) in SWFI 2 g/20ml IV PUSH syringe     Ordering Provider: Connor Galindo MD    2 g  over 5 Minutes Intravenous Once 07/29/21 1015 07/29/21 1127    07/27/21 1956  Pharmacy to dose vancomycin     Kizzy Navarro PharmBETH reviewed the order on 07/31/21 1556.   Ordering Provider: Hill Bhatia DO     Does not apply Continuous PRN 07/27/21 1953 08/25/21 8348            Mary Whitaker PharmD  08/02/21 11:36 EDT

## 2021-08-02 NOTE — PROGRESS NOTES
"     LOS: 4 days   Patient Care Team:  [facility]usuf Jones MD as PCP [facility]ing Phys[facility] (Cardiology)  Shirley, Moise Kathleen MD as Consulting Physician (Cardiac Electrophysiology)  Izzy Alvarez MD as Consulting Physician (Nephrology)  Celine Swanson MD as Consulting Physician (Cardiology)    Subjective   \"I feel ok, the surgery went well\"  Interval History:  Abd/Pelvis CT shows fistula with contrast into subQ tissue, surgery with debridement yesterday    Patient Complaints: Abd pain; no other complaints; in good spirits    History taken from: patient    Review of Systems   Constitutional: Positive for activity change. Negative for appetite change, fatigue and fever.   HENT: Negative for facial swelling.    Eyes: Negative for visual disturbance.   Respiratory: Negative for cough, shortness of breath, wheezing and stridor.    Cardiovascular: Negative for chest pain, palpitations and leg swelling.   Gastrointestinal: Positive for abdominal pain. Negative for constipation, diarrhea, nausea and vomiting.   Genitourinary: Negative for urgency.   Musculoskeletal: Positive for arthralgias.   Skin: Positive for wound. Negative for rash.   Neurological: Negative for weakness.   Psychiatric/Behavioral: Negative for confusion.           Objective     Vital Signs  Temp:  [97.2 °F (36.2 °C)-98.2 °F (36.8 °C)] 97.2 °F (36.2 °C)  Heart Rate:  [70-78] 70  Resp:  [12-20] 15  BP: (111-160)/(56-92) 159/92    Physical Exam:     General Appearance:    Alert, cooperative, in no acute distress, obese   Head:    Normocephalic, without obvious abnormality, atraumatic   Eyes:            Lids and lashes normal, conjunctivae and sclerae normal, no   icterus, no pallor   Ears:    Ears appear intact with no abnormalities noted   Throat:   No oral lesions, no thrush, oral mucosa moist   Neck:   No adenopathy, supple, trachea midline   Lungs:     Clear to auscultation,respirations regular, " even and                  unlabored    Heart:    Regular rhythm and normal rate, normal S1 and S2, no            murmur, no gallop, no rub, no click   Chest Wall:    No abnormalities observed   Abdomen:     Bandage clean and dry; binder in place, mod pain   Extremities:   Moves all extremities well, no edema, no cyanosis, no             Redness   Pulses:   Pulses palpable and equal bilaterally   Skin:   Bilateral CVS chanes   Lymph nodes:   No palpable adenopathy   Neurologic:   Cranial nerves 2 - 12 grossly intact, sensation intact        Results Review:    Lab Results (last 24 hours)     Procedure Component Value Units Date/Time    Vancomycin, Trough [[id number]]  (Normal) Collected: 08/02/21 0959    Specimen: Blood Updated: 08/02/21 1033     Vancomycin Trough 14.70 mcg/mL     Narrative:      Therapeutic Ranges for Vancomycin    Vancomycin Random   5.0-40.0 mcg/mL  Vancomycin Trough   5.0-20.0 mcg/mL  Vancomycin Peak     20.0-40.0 mcg/mL    POC Glucose Once [[id number]]  (Abnormal) Collected: 08/02/21 0731    Specimen: Blood Updated: 08/02/21 0732     Glucose 132 mg/dL      Comment: Serial Number: [id number]:  [id number]       Tissue / Bone Culture - Tissue, Abdominal Wall [[id number]] Collected: 08/01/21 1313    Specimen: Tissue from Abdominal Wall Updated: 08/02/21 0654     Gram Stain Many (4+) WBCs per low power field      Many (4+) Gram positive cocci in clusters    Manual Differential [[id number]]  (Abnormal) Collected: 08/02/21 0338    Specimen: Blood Updated: 08/02/21 0441     Neutrophil % 76.0 %      Lymphocyte % 10.0 %      Monocyte % 7.0 %      Eosinophil % 2.0 %      Bands %  5.0 %      Neutrophils Absolute 5.35 10*3/mm3      Lymphocytes Absolute 0.66 10*3/mm3      Monocytes Absolute 0.46 10*3/mm3      Eosinophils Absolute 0.13 10*3/mm3      Anisocytosis Slight/1+     Elliptocytes Mod/2+     Poikilocytes Slight/1+     Vacuolated Neutrophils Slight/1+     Platelet Morphology Normal    CBC &  Differential [[id number]]  (Abnormal) Collected: 08/02/21 0338    Specimen: Blood Updated: 08/02/21 0441    Narrative:      The following orders were created for panel order CBC & Differential.  Procedure                               Abnormality         Status                     ---------                               -----------         ------                     Scan Slide[[id number]]                                       Final result               CBC Auto Differential[[id number]]        Abnormal            Final result                 Please view results for these tests on the individual orders.    Scan Slide [[id number]] Collected: 08/02/21 0338    Specimen: Blood Updated: 08/02/21 0441     Scan Slide --     Comment: See Manual Differential Results       CBC Auto Differential [[id number]]  (Abnormal) Collected: 08/02/21 0338    Specimen: Blood Updated: 08/02/21 0441     WBC 6.60 10*3/mm3      RBC 3.72 10*6/mm3      Hemoglobin 10.3 g/dL      Hematocrit 30.2 %      MCV 81.4 fL      MCH 27.7 pg      MCHC 34.0 g/dL      RDW 17.6 %      RDW-SD 50.8 fl      MPV 7.0 fL      Platelets 428 10*3/mm3     Narrative:      The previously reported component NRBC is no longer being reported. Previous result was 0.1 /100 WBC (Reference Range: 0.0-0.2 /100 WBC) on 8/2/2021 at [address] [place], [place] [address]T.    Comprehensive Metabolic Panel [[id number]]  (Abnormal) Collected: 08/02/21 0338    Specimen: Blood Updated: 08/02/21 0421     Glucose 146 mg/dL      BUN 8 mg/dL      Creatinine 0.84 mg/dL      Sodium 133 mmol/L      Potassium 4.4 mmol/L      Chloride 101 mmol/L      CO2 26.0 mmol/L      Calcium 8.4 mg/dL      Total Protein 5.7 g/dL      Albumin 2.70 g/dL      ALT (SGPT) 13 U/L      AST (SGOT) 12 U/L      Alkaline Phosphatase 243 U/L      Total Bilirubin 0.5 mg/dL      eGFR Non African Amer 89 mL/min/1.73      Globulin 3.0 gm/dL      A/G Ratio 0.9 g/dL      BUN/Creatinine Ratio 9.5     Anion Gap 6.0 mmol/L     Narrative:      GFR Normal >60  Chronic  Kidney Disease <60  Kidney Failure <15      Vancomycin, Peak Levels incorrectly canceled yesterday, do not cancel this order [[id number]]  (Normal) Collected: 08/02/21 0338    Specimen: Blood Updated: 08/02/21 0421     Vancomycin Peak 20.50 mcg/mL     Narrative:      Therapeutic Ranges for Vancomycin    Vancomycin Random   5.0-40.0 mcg/mL  Vancomycin Trough   5.0-20.0 mcg/mL  Vancomycin Peak     20.0-40.0 mcg/mL    Vancomycin, Trough Levels incorrectly canceled yesterday, do not cancel this order [[id number]]  (Abnormal) Collected: 08/02/21 0338    Specimen: Blood Updated: 08/02/21 0419     Vancomycin Trough 20.40 mcg/mL     Narrative:      Therapeutic Ranges for Vancomycin    Vancomycin Random   5.0-40.0 mcg/mL  Vancomycin Trough   5.0-20.0 mcg/mL  Vancomycin Peak     20.0-40.0 mcg/mL    POC Glucose Once [[id number]]  (Abnormal) Collected: 08/01/21 2152    Specimen: Blood Updated: 08/01/21 2153     Glucose 173 mg/dL      Comment: Serial Number: [id number]:  [id number]       POC Glucose Once [[id number]]  (Abnormal) Collected: 08/01/21 1625    Specimen: Blood Updated: 08/01/21 1626     Glucose 220 mg/dL      Comment: Serial Number: [id number]:  [id number]              Imaging Results (Last 24 Hours)     ** No results found for the last 24 hours. **               I reviewed the patient's new clinical results.    Medication Review:   Scheduled Meds:aspirin, 81 mg, Oral, Daily  atorvastatin, 40 mg, Oral, QAM  budesonide, 0.5 mg, Nebulization, BID - RT  clopidogrel, 75 mg, Oral, QAM  docusate sodium, 100 mg, Oral, BID  furosemide, 20 mg, Oral, QAM  insulin lispro, 0-7 Units, Subcutaneous, TID AC  ipratropium-albuterol, 3 mL, Nebulization, Q6H - RT  losartan, 50 mg, Oral, QAM  metoprolol tartrate, 25 mg, Oral, Q12H  mirtazapine, 30 mg, Oral, Nightly  potassium chloride, 10 mEq, Oral, TID With Meals  pregabalin, 150 mg, Oral, BID  rOPINIRole, 0.5 mg, Oral, Nightly  traZODone, 100 mg, Oral, Nightly  vancomycin, 750  mg, Intravenous, Q12H      Continuous Infusions:Pharmacy to dose vancomycin,   sodium chloride, 100 mL/hr, Last Rate: 100 mL/hr (08/02/21 0702)      PRN Meds:.•  acetaminophen **OR** acetaminophen  •  alteplase 2 mg vial + sterile water for injection  •  bisacodyl  •  calcium carbonate  •  dextrose  •  dextrose  •  docusate sodium  •  glucagon (human recombinant)  •  hydrALAZINE  •  HYDROcodone-acetaminophen  •  HYDROcodone-acetaminophen  •  HYDROmorphone **AND** naloxone  •  insulin lispro **AND** insulin lispro  •  ipratropium-albuterol  •  ketorolac  •  magnesium sulfate **OR** magnesium sulfate in D5W 1g/100mL (PREMIX)  •  melatonin  •  metoclopramide  •  ondansetron **OR** ondansetron  •  oxyCODONE  •  Pharmacy to dose vancomycin  •  potassium chloride **OR** potassium chloride **OR** potassium chloride  •  promethazine **OR** promethazine  •  [COMPLETED] Insert peripheral IV **AND** sodium chloride     Assessment/Plan       Cellulitis of abdominal wall  CT A&P shows RLQ enterocutaneous fistula  DM-II with complication of nephropathy, neuropahty  Immunocompromised status  CKD 3  Obesity  Panlobualr emphysema  Essential hypertension  Chronic narcotic dependecny  Restless Legs  Chronic hypoxemia  Cirrhosis  Diabetic neuropathy  Peripheral artery disease     POD 4 s/p exploration/I & D of abdominal wall abscess -POD1 of further debridement, continue antibiotics under the direction of ID.  Follow up cultures.  CT imaging shows RLQ enterocutaneous fistula-plan is to have further surgical debridement today with Dr Bhatia    Will need rehab    Plan for disposition:LEIGH ANN Patel, KOURTNEY  08/02/21  11:01 EDT

## 2021-08-02 NOTE — PROGRESS NOTES
Infectious Diseases Progress Note      LOS: 4 days   Patient Care Team:  Yusuf Jones MD as PCP - General  [facility]ing [facility] ([facility]iology)  Moise Watson MD as Consulting Physician (Cardiac Electrophysiology)  Izzy Alvarez MD as Consulting Physician (Nephrology)  Celine Swanson MD as Consulting Physician (Cardiology)    Chief Complaint: Abdominal wall wound    Subjective     The patient had no fever during the last 24 hours.  The patient remained hemodynamically stable.  The patient is complaining of mild abdominal pain.    Review of Systems:   Review of Systems   Constitutional: Negative.    HENT: Negative.    Eyes: Negative.    Respiratory: Negative.    Cardiovascular: Negative.    Gastrointestinal: Negative.    Genitourinary: Negative.    Musculoskeletal: Negative.    Skin: Positive for wound.   Neurological: Negative.    Hematological: Negative.    Psychiatric/Behavioral: Negative.         Objective     Vital Signs  Temp:  [97.2 °F (36.2 °C)-98.2 °F (36.8 °C)] 98 °F (36.7 °C)  Heart Rate:  [70-78] 70  Resp:  [12-20] 15  BP: (111-160)/(56-92) 150/89    Physical Exam:  Physical Exam  Vitals and nursing note reviewed.   Constitutional:       Appearance: He is well-developed.   HENT:      Head: Normocephalic and atraumatic.   Eyes:      Pupils: Pupils are equal, round, and reactive to light.   Cardiovascular:      Rate and Rhythm: Normal rate and regular rhythm.      Heart sounds: Normal heart sounds.   Pulmonary:      Effort: Pulmonary effort is normal. No respiratory distress.      Breath sounds: Normal breath sounds. No wheezing or rales.   Abdominal:      General: There is distension.      Palpations: Abdomen is soft. There is no mass.      Tenderness: There is no abdominal tenderness. There is no guarding or rebound.      Comments: Bulky surgical dressings present on abdomen wall.  No attempt to remove the surgical dressings   Musculoskeletal:          General: No deformity. Normal range of motion.      Cervical back: Normal range of motion and neck supple.   Skin:     General: Skin is warm.      Findings: No erythema or rash.   Neurological:      Mental Status: He is alert and oriented to person, place, and time.      Cranial Nerves: No cranial nerve deficit.          Results Review:    I have reviewed all clinical data, test, lab, and imaging results.     Radiology  No Radiology Exams Resulted Within Past 24 Hours    Cardiology    Laboratory    Results from last 7 days   Lab Units 08/02/21  0338 08/01/21 0340 07/31/21 0245 07/30/21  0142 07/29/21  0759 07/28/21  0536 07/27/21  1236   WBC 10*3/mm3 6.60 7.70 8.00 10.40 9.20 8.30 10.80   HEMOGLOBIN g/dL 10.3* 11.4* 10.9* 11.3* 11.1* 11.0* 11.7*   HEMATOCRIT % 30.2* 34.2* 32.8* 34.2* 33.0* 32.9* 35.0*   PLATELETS 10*3/mm3 428 497* 476* 495* 441 476* 542*     Results from last 7 days   Lab Units 08/02/21  0338 08/01/21 0340 07/31/21  0245 07/30/21 0142 07/29/21  0759 07/28/21  0536 07/27/21  1236   SODIUM mmol/L 133* 135* 134* 134* 134* 134* 134*   POTASSIUM mmol/L 4.4 4.2 4.2 5.2 4.1 4.2 4.5   CHLORIDE mmol/L 101 101 99 100 99 97* 95*   CO2 mmol/L 26.0 25.0 25.0 24.0 25.0 29.0 29.0   BUN mg/dL 8 10 12 15 11 11 11   CREATININE mg/dL 0.84 0.89 0.95 1.02 0.84 0.93 1.02   GLUCOSE mg/dL 146* 135* 139* 191* 158* 145* 127*   ALBUMIN g/dL 2.70* 3.00* 2.90* 2.70*  --   --  2.80*   BILIRUBIN mg/dL 0.5 0.6 0.4 0.6  --   --  0.4   ALK PHOS U/L 243* 277* 297* 350*  --   --  170*   AST (SGOT) U/L 12 19 20 22  --   --  26   ALT (SGPT) U/L 13 18 23 34  --   --  16   CALCIUM mg/dL 8.4* 8.6 8.4* 8.4* 8.0* 8.4* 9.0     Results from last 7 days   Lab Units 07/27/21  1236   CK TOTAL U/L 19*             Microbiology   Microbiology Results (last 10 days)     Procedure Component Value - Date/Time    Tissue / Bone Culture - Tissue, Abdominal Wall [[id number]] Collected: 08/01/21 1313    Lab Status: Preliminary result Specimen:  Tissue from Abdominal Wall Updated: 08/02/21 0654     Gram Stain Many (4+) WBCs per low power field      Many (4+) Gram positive cocci in clusters    Anaerobic Culture - Wound, Abdominal Wall [[id number]] Collected: 07/29/21 1148    Lab Status: Preliminary result Specimen: Wound from Abdominal Wall Updated: 08/01/21 1456     Anaerobic Culture No anaerobes isolated at 3 days    Wound Culture - Wound, Abdominal Wall [[id number]]  (Abnormal) Collected: 07/29/21 1148    Lab Status: Final result Specimen: Wound from Abdominal Wall Updated: 07/31/21 0723     Wound Culture Moderate growth (3+) Staphylococcus aureus, MRSA     Comment: Methicillin resistant Staphylococcus aureus, Patient may be an isolation risk.        Gram Stain Moderate (3+) WBCs per low power field      Few (2+) Gram positive cocci    Narrative:      Refer to previous wound culture collected on  07/27/21 for 12:34 MICs.    Anaerobic Culture - Wound, Abdominal Wall [[id number]] Collected: 07/29/21 1147    Lab Status: Preliminary result Specimen: Wound from Abdominal Wall Updated: 08/01/21 1456     Anaerobic Culture No anaerobes isolated at 3 days    Wound Culture - Wound, Abdominal Wall [[id number]]  (Abnormal) Collected: 07/29/21 1147    Lab Status: Final result Specimen: Wound from Abdominal Wall Updated: 07/31/21 0723     Wound Culture Moderate growth (3+) Staphylococcus aureus, MRSA     Comment: Methicillin resistant Staphylococcus aureus, Patient may be an isolation risk.        Gram Stain Moderate (3+) WBCs per low power field      Moderate (3+) Gram positive cocci    Narrative:      Refer to previous wound culture collected on  07/27/21 for 12:34 MICs.    COVID PRE-OP / PRE-PROCEDURE SCREENING ORDER (NO ISOLATION) - Swab, Nasopharynx [[id number]]  (Normal) Collected: 07/28/21 0056    Lab Status: Final result Specimen: Swab from Nasopharynx Updated: 07/28/21 1500    Narrative:      The following orders were created for panel order COVID PRE-OP /  PRE-PROCEDURE SCREENING ORDER (NO ISOLATION) - Swab, Nasopharynx.  Procedure                               Abnormality         Status                     ---------                               -----------         ------                     COVID-19,APTIMA PANTHER,...[[id number]]  Normal              Final result                 Please view results for these tests on the individual orders.    COVID-19,APTIMA PANTHER,[facility], NP/OP SWAB IN UTM/VTM/[facility] TRANSPORT MEDIA,24 HR TAT - Swab, Nasopharynx [[id number]]  (Normal) Collected: 07/28/21 0056    Lab Status: Final result Specimen: Swab from Nasopharynx Updated: 07/28/21 1500     COVID19 Not Detected    Narrative:      Fact sheet for providers: https://www.fda.gov/media/997215/download     Fact sheet for patients: https://www.fda.gov/media/698732/download    Test performed by RT PCR.    Blood Culture - Blood, Arm, Right [[id number]] Collected: 07/27/21 1236    Lab Status: Final result Specimen: Blood from Arm, Right Updated: 08/01/21 1300     Blood Culture No growth at 5 days    Blood Culture - Blood, Arm, Left [[id number]] Collected: 07/27/21 1236    Lab Status: Final result Specimen: Blood from Arm, Left Updated: 08/01/21 1300     Blood Culture No growth at 5 days    Wound Culture - Wound, Abdominal Wall [[id number]]  (Abnormal)  (Susceptibility) Collected: 07/27/21 1234    Lab Status: Final result Specimen: Wound from Abdominal Wall Updated: 07/30/21 1056     Wound Culture Scant growth (1+) Staphylococcus aureus, MRSA     Gram Stain Many (4+) WBCs per low power field      Few (2+) Gram positive cocci, intracellular and extracellular    Susceptibility      Staphylococcus aureus, MRSA      YASMIN      Clindamycin Resistant      Erythromycin Resistant      Inducible Clindamycin Resistance Negative      Oxacillin Resistant      Penicillin G Resistant      Rifampin Susceptible      Tetracycline Susceptible      Trimethoprim + Sulfamethoxazole Susceptible      Vancomycin  Susceptible               Linear View                         Medication Review:       Schedule Meds  aspirin, 81 mg, Oral, Daily  atorvastatin, 40 mg, Oral, QAM  budesonide, 0.5 mg, Nebulization, BID - RT  clopidogrel, 75 mg, Oral, QAM  docusate sodium, 100 mg, Oral, BID  furosemide, 20 mg, Oral, QAM  insulin lispro, 0-7 Units, Subcutaneous, TID AC  ipratropium-albuterol, 3 mL, Nebulization, Q6H - RT  losartan, 50 mg, Oral, QAM  metoprolol tartrate, 25 mg, Oral, Q12H  mirtazapine, 30 mg, Oral, Nightly  potassium chloride, 10 mEq, Oral, TID With Meals  pregabalin, 150 mg, Oral, BID  rOPINIRole, 0.5 mg, Oral, Nightly  traZODone, 100 mg, Oral, Nightly  vancomycin, 750 mg, Intravenous, Q12H        Infusion Meds  Pharmacy to dose vancomycin,   sodium chloride, 100 mL/hr, Last Rate: 100 mL/hr (08/02/21 0702)        PRN Meds  •  acetaminophen **OR** acetaminophen  •  alteplase 2 mg vial + sterile water for injection  •  bisacodyl  •  calcium carbonate  •  dextrose  •  dextrose  •  docusate sodium  •  glucagon (human recombinant)  •  hydrALAZINE  •  HYDROcodone-acetaminophen  •  HYDROcodone-acetaminophen  •  HYDROmorphone **AND** naloxone  •  insulin lispro **AND** insulin lispro  •  ipratropium-albuterol  •  ketorolac  •  magnesium sulfate **OR** magnesium sulfate in D5W 1g/100mL (PREMIX)  •  melatonin  •  metoclopramide  •  ondansetron **OR** ondansetron  •  oxyCODONE  •  Pharmacy to dose vancomycin  •  potassium chloride **OR** potassium chloride **OR** potassium chloride  •  promethazine **OR** promethazine  •  [COMPLETED] Insert peripheral IV **AND** sodium chloride        Assessment/Plan       Antimicrobial Therapy   1.       Day  2.      Day  3.      Day  4.      Day  5.      Day      Assessment  Abdominal wound infection with MRSA.  The patient is s/p excision abdominal wound debridement on July 29, 2021 and had another debridement on August 1, 2021.  Wound culture from July 29, 2021 is growing MRSA.  Culture from  August 1, 2021 are pending but Gram stain showed gram-positive cocci in clusters    Recent bacteremia with MRSA secondary to above the patient was hospitalized 2 weeks ago with MRSA bacteremia and was started on IV vancomycin.  Blood culture from July 16, 2021 was positive for MRSA.  Repeat blood culture from July 27, 2021 is negative x2 sets  The patient currently has a PICC line in the right arm for IV antibiotics.  Transesophageal echo from last admission on July 22, 2021 was negative for vegetation    History of pacemaker placement in the past      Plan    Recommend to continue IV vancomycin for 4 weeks from the last surgical debridement and try to keep vancomycin trough between 15 and 20, the last day of treatment will be August 28, 2021  Continue supportive care  A.m. labs        Francis Morton MD  08/02/21  13:11 EDT  Note is dictated utilizing voice recognition software/Dragon

## 2021-08-02 NOTE — NURSING NOTE
Dr. Bhatia rounded and would like for WOCN to see for dressing change recommendation. Order was placed.

## 2021-08-02 NOTE — PLAN OF CARE
Goal Outcome Evaluation:      Patient remains painful this shift but mainly complains about his chronic back pain rather than his abdominal pain. He continues to use only IV pain medicine and refuses to take PO pain medicine. Patient vitals remains stable and urine output is good. Will continue to monitor.

## 2021-08-02 NOTE — PLAN OF CARE
Goal Outcome Evaluation:              Outcome Summary: pt doing well today but remians very painful. Dr. Bhatia wants WOCN to see for dressing change recommendations, until then surgical dressing will remain intact. Dr. Morton recommends 4 weeks of IV abx from most recent surgery.

## 2021-08-02 NOTE — CASE MANAGEMENT/SOCIAL WORK
Continued Stay Note  KATLYN Devlin     Patient Name: Ro Nathan  MRN: [id number]  Today's Date: 8/2/2021    Admit Date: 7/27/2021    Discharge Plan     Row Name 08/02/21 1143       Plan    Plan  D/C Plan: Home with [facility] (current, [facility] order placed) and [facility] for IV abx.    Plan Comments  Barrier to D/C: POD#1 debridement of abd wall, IV abx, wound care consult.          Expected Discharge Date and Time     Expected Discharge Date Expected Discharge Time    Aug 5, 2021             Yanelis Sultana

## 2021-08-02 NOTE — PROGRESS NOTES
LOS: 4 days   Patient Care Team:  Yusuf Jones MD as PCP - General  Joshua Yip MD as Consulting Physician (Cardiology)  Shirley, Moise Kathleen MD as Consulting Physician (Cardiac Electrophysiology)  Izzy Alvarez MD as Consulting Physician (Nephrology)  Celine Swanson MD as Consulting Physician (Cardiology)    Reason for follow-up: Postop    Subjective   Patient seen and examined.  No complaints    Objective   Wound is looking better    Vital Signs  Vitals:    08/01/21 2316 08/02/21 0100 08/02/21 0500 08/02/21 0734   BP:  121/67 160/73 159/92   BP Location:    Left arm   Patient Position:    Lying   Pulse: 78 70 73 70   Resp: 18 18 20 15   Temp:  97.5 °F (36.4 °C) 98.2 °F (36.8 °C) 97.2 °F (36.2 °C)   TempSrc:  Oral  Oral   SpO2: 99% 94% 97% 96%   Weight:   124 kg (273 lb 5.9 oz)    Height:             Results Review:       Lab Results (last 24 hours)     Procedure Component Value Units Date/Time    Vancomycin, Trough [[id number]] Collected: 08/02/21 0959    Specimen: Blood Updated: 08/02/21 1008    POC Glucose Once [[id number]]  (Abnormal) Collected: 08/02/21 0731    Specimen: Blood Updated: 08/02/21 0732     Glucose 132 mg/dL      Comment: Serial Number: [id number]:  [id number]       Tissue / Bone Culture - Tissue, Abdominal Wall [[id number]] Collected: 08/01/21 1313    Specimen: Tissue from Abdominal Wall Updated: 08/02/21 0654     Gram Stain Many (4+) WBCs per low power field      Many (4+) Gram positive cocci in clusters    Manual Differential [[id number]]  (Abnormal) Collected: 08/02/21 0338    Specimen: Blood Updated: 08/02/21 0441     Neutrophil % 76.0 %      Lymphocyte % 10.0 %      Monocyte % 7.0 %      Eosinophil % 2.0 %      Bands %  5.0 %      Neutrophils Absolute 5.35 10*3/mm3      Lymphocytes Absolute 0.66 10*3/mm3      Monocytes Absolute 0.46 10*3/mm3      Eosinophils Absolute 0.13 10*3/mm3      Anisocytosis Slight/1+     Elliptocytes Mod/2+     Poikilocytes  Slight/1+     Vacuolated Neutrophils Slight/1+     Platelet Morphology Normal    CBC & Differential [[id number]]  (Abnormal) Collected: 08/02/21 0338    Specimen: Blood Updated: 08/02/21 0441    Narrative:      The following orders were created for panel order CBC & Differential.  Procedure                               Abnormality         Status                     ---------                               -----------         ------                     Scan Slide[[id number]]                                       Final result               CBC Auto Differential[[id number]]        Abnormal            Final result                 Please view results for these tests on the individual orders.    Scan Slide [[id number]] Collected: 08/02/21 0338    Specimen: Blood Updated: 08/02/21 0441     Scan Slide --     Comment: See Manual Differential Results       CBC Auto Differential [[id number]]  (Abnormal) Collected: 08/02/21 0338    Specimen: Blood Updated: 08/02/21 0441     WBC 6.60 10*3/mm3      RBC 3.72 10*6/mm3      Hemoglobin 10.3 g/dL      Hematocrit 30.2 %      MCV 81.4 fL      MCH 27.7 pg      MCHC 34.0 g/dL      RDW 17.6 %      RDW-SD 50.8 fl      MPV 7.0 fL      Platelets 428 10*3/mm3     Narrative:      The previously reported component NRBC is no longer being reported. Previous result was 0.1 /100 WBC (Reference Range: 0.0-0.2 /100 WBC) on 8/2/2021 at [facility]6 EDT.    Comprehensive Metabolic Panel [[id number]]  (Abnormal) Collected: 08/02/21 0338    Specimen: Blood Updated: 08/02/21 0421     Glucose 146 mg/dL      BUN 8 mg/dL      Creatinine 0.84 mg/dL      Sodium 133 mmol/L      Potassium 4.4 mmol/L      Chloride 101 mmol/L      CO2 26.0 mmol/L      Calcium 8.4 mg/dL      Total Protein 5.7 g/dL      Albumin 2.70 g/dL      ALT (SGPT) 13 U/L      AST (SGOT) 12 U/L      Alkaline Phosphatase 243 U/L      Total Bilirubin 0.5 mg/dL      eGFR Non African Amer 89 mL/min/1.73      Globulin 3.0 gm/dL      A/G Ratio 0.9 g/dL       BUN/Creatinine Ratio 9.5     Anion Gap 6.0 mmol/L     Narrative:      GFR Normal >60  Chronic Kidney Disease <60  Kidney Failure <15      Vancomycin, Peak Levels incorrectly canceled yesterday, do not cancel this order [[id number]]  (Normal) Collected: 08/02/21 0338    Specimen: Blood Updated: 08/02/21 0421     Vancomycin Peak 20.50 mcg/mL     Narrative:      Therapeutic Ranges for Vancomycin    Vancomycin Random   5.0-40.0 mcg/mL  Vancomycin Trough   5.0-20.0 mcg/mL  Vancomycin Peak     20.0-40.0 mcg/mL    Vancomycin, Trough Levels incorrectly canceled yesterday, do not cancel this order [[id number]]  (Abnormal) Collected: 08/02/21 0338    Specimen: Blood Updated: 08/02/21 0419     Vancomycin Trough 20.40 mcg/mL     Narrative:      Therapeutic Ranges for Vancomycin    Vancomycin Random   5.0-40.0 mcg/mL  Vancomycin Trough   5.0-20.0 mcg/mL  Vancomycin Peak     20.0-40.0 mcg/mL    POC Glucose Once [[id number]]  (Abnormal) Collected: 08/01/21 2152    Specimen: Blood Updated: 08/01/21 2153     Glucose 173 mg/dL      Comment: Serial Number: [id number]:  [id number]       POC Glucose Once [[id number]]  (Abnormal) Collected: 08/01/21 1625    Specimen: Blood Updated: 08/01/21 1626     Glucose 220 mg/dL      Comment: Serial Number: [id number]:  [id number]       Anaerobic Culture - Wound, Abdominal Wall [[id number]] Collected: 07/29/21 1147    Specimen: Wound from Abdominal Wall Updated: 08/01/21 1456     Anaerobic Culture No anaerobes isolated at 3 days    Anaerobic Culture - Wound, Abdominal Wall [[id number]] Collected: 07/29/21 1148    Specimen: Wound from Abdominal Wall Updated: 08/01/21 1456     Anaerobic Culture No anaerobes isolated at 3 days           Imaging Results (Last 24 Hours)     ** No results found for the last 24 hours. **          Medication Review:     Assessment/Plan         Cellulitis of abdominal wall    Impression: Postop day #1 repeat debridement of anterior abdominal wall    Plan: Consult wound  care for dressing changes.  Dr. Wang back on Wednesday          Hill Bhatia DO  08/02/21  10:22 EDT

## 2021-08-02 NOTE — NURSING NOTE
75-year-old male consult received to assess patient abdominal wound.  Patient has postsurgical wound to the abdomen approximate size of which is 11 x 12 cm with a depth of point 2 there is some undermining noted starting at 5:00 ending at 8:00 of 4-1/2 cm.  The wound bed is pink glassy in appearance there is some bowel noted small to moderate amount of serosanguineous exudate is noted from the wound.  A great deal of scar tissue surrounding the wound site.  No odor no induration noted.  Current treatment of normal saline and emulsion dressing is appropriate also might consider moistening Opticyl silver with normal saline and applying to the area and covering with ABD pads.

## 2021-08-03 ENCOUNTER — TELEPHONE (OUTPATIENT)
Dept: ORTHOPEDIC SURGERY | Facility: CLINIC | Age: 76
End: 2021-08-03

## 2021-08-03 LAB
ALBUMIN SERPL-MCNC: 3 G/DL (ref 3.5–5.2)
ALBUMIN/GLOB SERPL: 0.9 G/DL
ALP SERPL-CCNC: 237 U/L (ref 39–117)
ALT SERPL W P-5'-P-CCNC: 13 U/L (ref 1–41)
ANION GAP SERPL CALCULATED.3IONS-SCNC: 9 MMOL/L (ref 5–15)
AST SERPL-CCNC: 17 U/L (ref 1–40)
BACTERIA SPEC ANAEROBE CULT: NORMAL
BACTERIA SPEC ANAEROBE CULT: NORMAL
BASOPHILS # BLD AUTO: 0 10*3/MM3 (ref 0–0.2)
BASOPHILS NFR BLD AUTO: 0.3 % (ref 0–1.5)
BILIRUB SERPL-MCNC: 0.5 MG/DL (ref 0–1.2)
BUN SERPL-MCNC: 10 MG/DL (ref 8–23)
BUN/CREAT SERPL: 9.9 (ref 7–25)
CALCIUM SPEC-SCNC: 8.4 MG/DL (ref 8.6–10.5)
CHLORIDE SERPL-SCNC: 101 MMOL/L (ref 98–107)
CO2 SERPL-SCNC: 26 MMOL/L (ref 22–29)
CREAT SERPL-MCNC: 1.01 MG/DL (ref 0.76–1.27)
DEPRECATED RDW RBC AUTO: 51.2 FL (ref 37–54)
EOSINOPHIL # BLD AUTO: 0.3 10*3/MM3 (ref 0–0.4)
EOSINOPHIL NFR BLD AUTO: 4.2 % (ref 0.3–6.2)
ERYTHROCYTE [DISTWIDTH] IN BLOOD BY AUTOMATED COUNT: 17.9 % (ref 12.3–15.4)
GFR SERPL CREATININE-BSD FRML MDRD: 72 ML/MIN/1.73
GLOBULIN UR ELPH-MCNC: 3.2 GM/DL
GLUCOSE BLDC GLUCOMTR-MCNC: 130 MG/DL (ref 70–105)
GLUCOSE BLDC GLUCOMTR-MCNC: 142 MG/DL (ref 70–105)
GLUCOSE BLDC GLUCOMTR-MCNC: 156 MG/DL (ref 70–105)
GLUCOSE BLDC GLUCOMTR-MCNC: 176 MG/DL (ref 70–105)
GLUCOSE BLDC GLUCOMTR-MCNC: 202 MG/DL (ref 70–105)
GLUCOSE BLDC GLUCOMTR-MCNC: 240 MG/DL (ref 70–105)
GLUCOSE SERPL-MCNC: 132 MG/DL (ref 65–99)
HCT VFR BLD AUTO: 33.4 % (ref 37.5–51)
HGB BLD-MCNC: 11.2 G/DL (ref 13–17.7)
LYMPHOCYTES # BLD AUTO: 1.6 10*3/MM3 (ref 0.7–3.1)
LYMPHOCYTES NFR BLD AUTO: 21 % (ref 19.6–45.3)
MCH RBC QN AUTO: 27.2 PG (ref 26.6–33)
MCHC RBC AUTO-ENTMCNC: 33.5 G/DL (ref 31.5–35.7)
MCV RBC AUTO: 81.2 FL (ref 79–97)
MONOCYTES # BLD AUTO: 0.5 10*3/MM3 (ref 0.1–0.9)
MONOCYTES NFR BLD AUTO: 5.8 % (ref 5–12)
NEUTROPHILS NFR BLD AUTO: 5.4 10*3/MM3 (ref 1.7–7)
NEUTROPHILS NFR BLD AUTO: 68.7 % (ref 42.7–76)
NRBC BLD AUTO-RTO: 0 /100 WBC (ref 0–0.2)
PLATELET # BLD AUTO: 456 10*3/MM3 (ref 140–450)
PMV BLD AUTO: 7.3 FL (ref 6–12)
POTASSIUM SERPL-SCNC: 4.1 MMOL/L (ref 3.5–5.2)
PROT SERPL-MCNC: 6.2 G/DL (ref 6–8.5)
RBC # BLD AUTO: 4.12 10*6/MM3 (ref 4.14–5.8)
SODIUM SERPL-SCNC: 136 MMOL/L (ref 136–145)
WBC # BLD AUTO: 7.8 10*3/MM3 (ref 3.4–10.8)

## 2021-08-03 PROCEDURE — 80053 COMPREHEN METABOLIC PANEL: CPT | Performed by: SURGERY

## 2021-08-03 PROCEDURE — 25010000002 HYDROMORPHONE PER 4 MG: Performed by: SURGERY

## 2021-08-03 PROCEDURE — 94760 N-INVAS EAR/PLS OXIMETRY 1: CPT

## 2021-08-03 PROCEDURE — 94799 UNLISTED PULMONARY SVC/PX: CPT

## 2021-08-03 PROCEDURE — 25010000002 VANCOMYCIN 750 MG RECONSTITUTED SOLUTION 1 EACH VIAL: Performed by: SURGERY

## 2021-08-03 PROCEDURE — 82962 GLUCOSE BLOOD TEST: CPT

## 2021-08-03 PROCEDURE — 63710000001 INSULIN LISPRO (HUMAN) PER 5 UNITS: Performed by: SURGERY

## 2021-08-03 PROCEDURE — 85025 COMPLETE CBC W/AUTO DIFF WBC: CPT | Performed by: SURGERY

## 2021-08-03 RX ADMIN — DOCUSATE SODIUM 100 MG: 100 CAPSULE, LIQUID FILLED ORAL at 21:02

## 2021-08-03 RX ADMIN — CLOPIDOGREL BISULFATE 75 MG: 75 TABLET ORAL at 07:27

## 2021-08-03 RX ADMIN — VANCOMYCIN HYDROCHLORIDE 750 MG: 750 INJECTION, POWDER, LYOPHILIZED, FOR SOLUTION INTRAVENOUS at 10:50

## 2021-08-03 RX ADMIN — INSULIN LISPRO 2 UNITS: 100 INJECTION, SOLUTION INTRAVENOUS; SUBCUTANEOUS at 13:33

## 2021-08-03 RX ADMIN — TRAZODONE HYDROCHLORIDE 100 MG: 100 TABLET ORAL at 21:02

## 2021-08-03 RX ADMIN — HYDROMORPHONE HYDROCHLORIDE 0.5 MG: 2 INJECTION, SOLUTION INTRAMUSCULAR; INTRAVENOUS; SUBCUTANEOUS at 07:25

## 2021-08-03 RX ADMIN — ASPIRIN 81 MG: 81 TABLET, COATED ORAL at 09:26

## 2021-08-03 RX ADMIN — BUDESONIDE 0.5 MG: 0.5 SUSPENSION RESPIRATORY (INHALATION) at 19:15

## 2021-08-03 RX ADMIN — POTASSIUM CHLORIDE 10 MEQ: 750 TABLET, EXTENDED RELEASE ORAL at 13:33

## 2021-08-03 RX ADMIN — SODIUM CHLORIDE 100 ML/HR: 9 INJECTION, SOLUTION INTRAVENOUS at 01:52

## 2021-08-03 RX ADMIN — PREGABALIN 150 MG: 75 CAPSULE ORAL at 21:02

## 2021-08-03 RX ADMIN — POTASSIUM CHLORIDE 10 MEQ: 750 TABLET, EXTENDED RELEASE ORAL at 07:26

## 2021-08-03 RX ADMIN — HYDROMORPHONE HYDROCHLORIDE 0.5 MG: 2 INJECTION, SOLUTION INTRAMUSCULAR; INTRAVENOUS; SUBCUTANEOUS at 13:36

## 2021-08-03 RX ADMIN — APIXABAN 5 MG: 5 TABLET, FILM COATED ORAL at 22:09

## 2021-08-03 RX ADMIN — HYDROMORPHONE HYDROCHLORIDE 0.5 MG: 2 INJECTION, SOLUTION INTRAMUSCULAR; INTRAVENOUS; SUBCUTANEOUS at 16:22

## 2021-08-03 RX ADMIN — BUDESONIDE 0.5 MG: 0.5 SUSPENSION RESPIRATORY (INHALATION) at 08:08

## 2021-08-03 RX ADMIN — LOSARTAN POTASSIUM 50 MG: 50 TABLET, FILM COATED ORAL at 07:26

## 2021-08-03 RX ADMIN — HYDROMORPHONE HYDROCHLORIDE 0.5 MG: 2 INJECTION, SOLUTION INTRAMUSCULAR; INTRAVENOUS; SUBCUTANEOUS at 01:51

## 2021-08-03 RX ADMIN — VANCOMYCIN HYDROCHLORIDE 750 MG: 750 INJECTION, POWDER, LYOPHILIZED, FOR SOLUTION INTRAVENOUS at 22:08

## 2021-08-03 RX ADMIN — HYDROMORPHONE HYDROCHLORIDE 0.5 MG: 2 INJECTION, SOLUTION INTRAMUSCULAR; INTRAVENOUS; SUBCUTANEOUS at 23:06

## 2021-08-03 RX ADMIN — IPRATROPIUM BROMIDE AND ALBUTEROL SULFATE 3 ML: 2.5; .5 SOLUTION RESPIRATORY (INHALATION) at 19:09

## 2021-08-03 RX ADMIN — PREGABALIN 150 MG: 75 CAPSULE ORAL at 09:26

## 2021-08-03 RX ADMIN — METOPROLOL TARTRATE 25 MG: 25 TABLET, FILM COATED ORAL at 09:26

## 2021-08-03 RX ADMIN — HYDROMORPHONE HYDROCHLORIDE 0.5 MG: 2 INJECTION, SOLUTION INTRAMUSCULAR; INTRAVENOUS; SUBCUTANEOUS at 10:50

## 2021-08-03 RX ADMIN — HYDROMORPHONE HYDROCHLORIDE 0.5 MG: 2 INJECTION, SOLUTION INTRAMUSCULAR; INTRAVENOUS; SUBCUTANEOUS at 19:33

## 2021-08-03 RX ADMIN — METOPROLOL TARTRATE 25 MG: 25 TABLET, FILM COATED ORAL at 21:02

## 2021-08-03 RX ADMIN — IPRATROPIUM BROMIDE AND ALBUTEROL SULFATE 3 ML: 2.5; .5 SOLUTION RESPIRATORY (INHALATION) at 23:16

## 2021-08-03 RX ADMIN — APIXABAN 5 MG: 5 TABLET, FILM COATED ORAL at 15:35

## 2021-08-03 RX ADMIN — IPRATROPIUM BROMIDE AND ALBUTEROL SULFATE 3 ML: 2.5; .5 SOLUTION RESPIRATORY (INHALATION) at 08:05

## 2021-08-03 RX ADMIN — DOCUSATE SODIUM 100 MG: 100 CAPSULE, LIQUID FILLED ORAL at 09:26

## 2021-08-03 RX ADMIN — POTASSIUM CHLORIDE 10 MEQ: 750 TABLET, EXTENDED RELEASE ORAL at 18:32

## 2021-08-03 RX ADMIN — IPRATROPIUM BROMIDE AND ALBUTEROL SULFATE 3 ML: 2.5; .5 SOLUTION RESPIRATORY (INHALATION) at 12:45

## 2021-08-03 RX ADMIN — FUROSEMIDE 20 MG: 20 TABLET ORAL at 07:26

## 2021-08-03 RX ADMIN — VANCOMYCIN HYDROCHLORIDE 750 MG: 750 INJECTION, POWDER, LYOPHILIZED, FOR SOLUTION INTRAVENOUS at 01:51

## 2021-08-03 RX ADMIN — ROPINIROLE HYDROCHLORIDE 0.5 MG: 0.25 TABLET, FILM COATED ORAL at 21:02

## 2021-08-03 RX ADMIN — Medication 10 ML: at 21:02

## 2021-08-03 RX ADMIN — MIRTAZAPINE 30 MG: 15 TABLET, FILM COATED ORAL at 21:02

## 2021-08-03 RX ADMIN — ATORVASTATIN CALCIUM 40 MG: 40 TABLET, FILM COATED ORAL at 07:26

## 2021-08-03 NOTE — PLAN OF CARE
Goal Outcome Evaluation:   Patient remains painful mostly with chronic back pain. Patient vitals and urine output stable. Will continue to monitor.

## 2021-08-03 NOTE — PROGRESS NOTES
"     LOS: 5 days   Patient Care Team:  Yusuf Jones MD as PCP - [facility] acute hospital[facility]ua MD as Consulting Physician (Cardiology)  Shirley, Moise Kathleen MD as Consulting Physician (Cardiac Electrophysiology)  Izzy Alvarez MD as Consulting Physician (Nephrology)  Celine Swanson MD as Consulting Physician (Cardiology)    Subjective   \"I feel ok\"  Interval History:  none    Patient Complaints: mild abd pain; no other complaints; in good spirits    History taken from: patient    Review of Systems   Constitutional: Positive for activity change. Negative for appetite change, fatigue and fever.   HENT: Negative for facial swelling.    Eyes: Negative for visual disturbance.   Respiratory: Negative for cough, shortness of breath, wheezing and stridor.    Cardiovascular: Negative for chest pain, palpitations and leg swelling.   Gastrointestinal: Positive for abdominal pain. Negative for constipation, diarrhea, nausea and vomiting.   Genitourinary: Negative for urgency.   Musculoskeletal: Positive for arthralgias.   Skin: Positive for wound. Negative for rash.   Neurological: Negative for weakness.   Psychiatric/Behavioral: Negative for confusion.           Objective     Vital Signs  Temp:  [97.2 °F (36.2 °C)-98 °F (36.7 °C)] 97.2 °F (36.2 °C)  Heart Rate:  [70-76] 76  Resp:  [15-18] 18  BP: (142-167)/(81-91) 152/85    Physical Exam:     General Appearance:    Alert, cooperative, in no acute distress, obese   Head:    Normocephalic, without obvious abnormality, atraumatic   Eyes:            Lids and lashes normal, conjunctivae and sclerae normal, no   icterus, no pallor   Ears:    Ears appear intact with no abnormalities noted   Throat:   No oral lesions, no thrush, oral mucosa moist   Neck:   No adenopathy, supple, trachea midline   Lungs:     Clear to auscultation,respirations regular, even and                  unlabored    Heart:    Regular rhythm and normal rate, normal S1 and S2, no            " murmur, no gallop, no rub, no click   Chest Wall:    No abnormalities observed   Abdomen:     Bandage clean and dry; binder in place, mod pain   Extremities:   Moves all extremities well, no edema, no cyanosis, no             Redness   Pulses:   Pulses palpable and equal bilaterally   Skin:   Bilateral CVS chanes   Lymph nodes:   No palpable adenopathy   Neurologic:   Cranial nerves 2 - 12 grossly intact, sensation intact        Results Review:    Lab Results (last 24 hours)     Procedure Component Value Units Date/Time    Anaerobic Culture - Wound, Abdominal Wall [[id number]] Collected: 07/29/21 1148    Specimen: Wound from Abdominal Wall Updated: 08/03/21 0958     Anaerobic Culture No anaerobes isolated at 5 days    Anaerobic Culture - Wound, Abdominal Wall [[id number]] Collected: 07/29/21 1147    Specimen: Wound from Abdominal Wall Updated: 08/03/21 0958     Anaerobic Culture No anaerobes isolated at 5 days    Tissue / Bone Culture - Tissue, Abdominal Wall [[id number]]  (Abnormal) Collected: 08/01/21 1313    Specimen: Tissue from Abdominal Wall Updated: 08/03/21 0826     Tissue Culture Moderate growth (3+) Staphylococcus aureus, MRSA     Comment: Methicillin resistant Staphylococcus aureus, Patient may be an isolation risk.        Gram Stain Many (4+) WBCs per low power field      Many (4+) Gram positive cocci in clusters    Comprehensive Metabolic Panel [[id number]]  (Abnormal) Collected: 08/03/21 0431    Specimen: Blood Updated: 08/03/21 0500     Glucose 132 mg/dL      BUN 10 mg/dL      Creatinine 1.01 mg/dL      Sodium 136 mmol/L      Potassium 4.1 mmol/L      Chloride 101 mmol/L      CO2 26.0 mmol/L      Calcium 8.4 mg/dL      Total Protein 6.2 g/dL      Albumin 3.00 g/dL      ALT (SGPT) 13 U/L      AST (SGOT) 17 U/L      Alkaline Phosphatase 237 U/L      Total Bilirubin 0.5 mg/dL      eGFR Non African Amer 72 mL/min/1.73      Globulin 3.2 gm/dL      A/G Ratio 0.9 g/dL      BUN/Creatinine Ratio 9.9     Anion Gap  9.0 mmol/L     Narrative:      GFR Normal >60  Chronic Kidney Disease <60  Kidney Failure <15      CBC & Differential [[id number]]  (Abnormal) Collected: 08/03/21 0430    Specimen: Blood Updated: 08/03/21 0440    Narrative:      The following orders were created for panel order CBC & Differential.  Procedure                               Abnormality         Status                     ---------                               -----------         ------                     CBC Auto Differential[[id number]]        Abnormal            Final result                 Please view results for these tests on the individual orders.    CBC Auto Differential [[id number]]  (Abnormal) Collected: 08/03/21 0430    Specimen: Blood Updated: 08/03/21 0440     WBC 7.80 10*3/mm3      RBC 4.12 10*6/mm3      Hemoglobin 11.2 g/dL      Hematocrit 33.4 %      MCV 81.2 fL      MCH 27.2 pg      MCHC 33.5 g/dL      RDW 17.9 %      RDW-SD 51.2 fl      MPV 7.3 fL      Platelets 456 10*3/mm3      Neutrophil % 68.7 %      Lymphocyte % 21.0 %      Monocyte % 5.8 %      Eosinophil % 4.2 %      Basophil % 0.3 %      Neutrophils, Absolute 5.40 10*3/mm3      Lymphocytes, Absolute 1.60 10*3/mm3      Monocytes, Absolute 0.50 10*3/mm3      Eosinophils, Absolute 0.30 10*3/mm3      Basophils, Absolute 0.00 10*3/mm3      nRBC 0.0 /100 WBC     POC Glucose Once [[id number]]  (Abnormal) Collected: 08/02/21 2143    Specimen: Blood Updated: 08/02/21 2144     Glucose 145 mg/dL      Comment: Serial Number: [id number]:  [id number]              Imaging Results (Last 24 Hours)     ** No results found for the last 24 hours. **               I reviewed the patient's new clinical results.    Medication Review:   Scheduled Meds:aspirin, 81 mg, Oral, Daily  atorvastatin, 40 mg, Oral, QAM  budesonide, 0.5 mg, Nebulization, BID - RT  clopidogrel, 75 mg, Oral, QAM  docusate sodium, 100 mg, Oral, BID  furosemide, 20 mg, Oral, QAM  insulin lispro, 0-7 Units, Subcutaneous, TID  AC  ipratropium-albuterol, 3 mL, Nebulization, Q6H - RT  losartan, 50 mg, Oral, QAM  metoprolol tartrate, 25 mg, Oral, Q12H  mirtazapine, 30 mg, Oral, Nightly  potassium chloride, 10 mEq, Oral, TID With Meals  pregabalin, 150 mg, Oral, BID  rOPINIRole, 0.5 mg, Oral, Nightly  traZODone, 100 mg, Oral, Nightly  vancomycin, 750 mg, Intravenous, Q12H      Continuous Infusions:Pharmacy to dose vancomycin,   sodium chloride, 100 mL/hr, Last Rate: 100 mL/hr (08/03/21 0152)      PRN Meds:.•  acetaminophen **OR** acetaminophen  •  alteplase 2 mg vial + sterile water for injection  •  bisacodyl  •  calcium carbonate  •  dextrose  •  dextrose  •  docusate sodium  •  glucagon (human recombinant)  •  hydrALAZINE  •  HYDROcodone-acetaminophen  •  HYDROcodone-acetaminophen  •  HYDROmorphone **AND** naloxone  •  insulin lispro **AND** insulin lispro  •  ipratropium-albuterol  •  ketorolac  •  magnesium sulfate **OR** magnesium sulfate in D5W 1g/100mL (PREMIX)  •  melatonin  •  metoclopramide  •  ondansetron **OR** ondansetron  •  oxyCODONE  •  Pharmacy to dose vancomycin  •  potassium chloride **OR** potassium chloride **OR** potassium chloride  •  promethazine **OR** promethazine  •  [COMPLETED] Insert peripheral IV **AND** sodium chloride     Assessment/Plan       Cellulitis of abdominal wall  CT A&P shows RLQ enterocutaneous fistula  DM-II with complication of nephropathy, neuropahty  Immunocompromised status  CKD 3  Obesity  Panlobualr emphysema  Essential hypertension  Chronic narcotic dependecny  Restless Legs  Chronic hypoxemia  Cirrhosis  Diabetic neuropathy  Peripheral artery disease     POD 5 s/p exploration/I & D of abdominal wall abscess -POD 2 of further debridement, continue antibiotics under the direction of ID.  Follow up cultures.  CT imaging shows RLQ enterocutaneous fistula-plan is to have further surgical debridement today with Dr Bhatia    Wound care per wound nurse    Will need rehab/and or HHC    Plan for  disposition:TBD    Sasha Patel, APRN  08/03/21  11:41 EDT

## 2021-08-03 NOTE — CASE MANAGEMENT/SOCIAL WORK
Continued Stay Note  KATLYN Devlin     Patient Name: Ro Nathan  MRN: [id number]  Today's Date: 8/3/2021    Admit Date: 7/27/2021    Discharge Plan     Row Name 08/03/21 1212       Plan    Plan  D/C Plan: Home with [facility] (current, [facility] order placed) and [facility] for IV abx.    Plan Comments  Barrier to D/C: POD#2 debridement of abd wall, wound care following, IV abx.        Expected Discharge Date and Time     Expected Discharge Date Expected Discharge Time    Aug 5, 2021             Yanelis Sultana

## 2021-08-03 NOTE — PROGRESS NOTES
Infectious Diseases Progress Note      LOS: 5 days   Patient Care Team:  Yusuf Jones MD as PCP - [facility] ([facility]iology[facility]  Moise Watson MD as Consulting Physician (Cardiac Electrophysiology)  Izzy Alvarez MD as Consulting Physician (Nephrology)  Celine Swanson MD as Consulting Physician (Cardiology)    Chief Complaint: Abdominal wall wound    Subjective     The patient had no fever during the last 24 hours.  The patient remained hemodynamically stable.  The patient is complaining of mild abdominal pain.    Review of Systems:   Review of Systems   Constitutional: Negative.    HENT: Negative.    Eyes: Negative.    Respiratory: Negative.    Cardiovascular: Negative.    Gastrointestinal: Negative.    Genitourinary: Negative.    Musculoskeletal: Negative.    Skin: Positive for wound.   Neurological: Negative.    Hematological: Negative.    Psychiatric/Behavioral: Negative.         Objective     Vital Signs  Temp:  [97.2 °F (36.2 °C)-97.8 °F (36.6 °C)] 97.8 °F (36.6 °C)  Heart Rate:  [70-76] 74  Resp:  [15-18] 16  BP: (142-167)/(80-91) 142/80    Physical Exam:  Physical Exam  Vitals and nursing note reviewed.   Constitutional:       Appearance: He is well-developed.   HENT:      Head: Normocephalic and atraumatic.   Eyes:      Pupils: Pupils are equal, round, and reactive to light.   Cardiovascular:      Rate and Rhythm: Normal rate and regular rhythm.      Heart sounds: Normal heart sounds.   Pulmonary:      Effort: Pulmonary effort is normal. No respiratory distress.      Breath sounds: Normal breath sounds. No wheezing or rales.   Abdominal:      General: There is distension.      Palpations: Abdomen is soft. There is no mass.      Tenderness: There is no abdominal tenderness. There is no guarding or rebound.      Comments: Bulky surgical dressings present on abdomen wall.  No attempt to remove the surgical dressings   Musculoskeletal:          General: No deformity. Normal range of motion.      Cervical back: Normal range of motion and neck supple.   Skin:     General: Skin is warm.      Findings: No erythema or rash.   Neurological:      Mental Status: He is alert and oriented to person, place, and time.      Cranial Nerves: No cranial nerve deficit.          Results Review:    I have reviewed all clinical data, test, lab, and imaging results.     Radiology  No Radiology Exams Resulted Within Past 24 Hours    Cardiology    Laboratory    Results from last 7 days   Lab Units 08/03/21  0430 08/02/21  0338 08/01/21  0340 07/31/21  0245 07/30/21  0142 07/29/21  0759 07/28/21  0536   WBC 10*3/mm3 7.80 6.60 7.70 8.00 10.40 9.20 8.30   HEMOGLOBIN g/dL 11.2* 10.3* 11.4* 10.9* 11.3* 11.1* 11.0*   HEMATOCRIT % 33.4* 30.2* 34.2* 32.8* 34.2* 33.0* 32.9*   PLATELETS 10*3/mm3 456* 428 497* 476* 495* 441 476*     Results from last 7 days   Lab Units 08/03/21  0431 08/02/21  0338 08/01/21 0340 07/31/21  0245 07/30/21 0142 07/29/21  0759 07/28/21  0536   SODIUM mmol/L 136 133* 135* 134* 134* 134* 134*   POTASSIUM mmol/L 4.1 4.4 4.2 4.2 5.2 4.1 4.2   CHLORIDE mmol/L 101 101 101 99 100 99 97*   CO2 mmol/L 26.0 26.0 25.0 25.0 24.0 25.0 29.0   BUN mg/dL 10 8 10 12 15 11 11   CREATININE mg/dL 1.01 0.84 0.89 0.95 1.02 0.84 0.93   GLUCOSE mg/dL 132* 146* 135* 139* 191* 158* 145*   ALBUMIN g/dL 3.00* 2.70* 3.00* 2.90* 2.70*  --   --    BILIRUBIN mg/dL 0.5 0.5 0.6 0.4 0.6  --   --    ALK PHOS U/L 237* 243* 277* 297* 350*  --   --    AST (SGOT) U/L 17 12 19 20 22  --   --    ALT (SGPT) U/L 13 13 18 23 34  --   --    CALCIUM mg/dL 8.4* 8.4* 8.6 8.4* 8.4* 8.0* 8.4*                 Microbiology   Microbiology Results (last 10 days)     Procedure Component Value - Date/Time    Tissue / Bone Culture - Tissue, Abdominal Wall [[id number]]  (Abnormal) Collected: 08/01/21 1313    Lab Status: Preliminary result Specimen: Tissue from Abdominal Wall Updated: 08/03/21 0827     Tissue  Culture Moderate growth (3+) Staphylococcus aureus, MRSA     Comment: Methicillin resistant Staphylococcus aureus, Patient may be an isolation risk.        Gram Stain Many (4+) WBCs per low power field      Many (4+) Gram positive cocci in clusters    Anaerobic Culture - Wound, Abdominal Wall [[id number]] Collected: 07/29/21 1148    Lab Status: Final result Specimen: Wound from Abdominal Wall Updated: 08/03/21 0958     Anaerobic Culture No anaerobes isolated at 5 days    Wound Culture - Wound, Abdominal Wall [[id number]]  (Abnormal) Collected: 07/29/21 1148    Lab Status: Final result Specimen: Wound from Abdominal Wall Updated: 07/31/21 0723     Wound Culture Moderate growth (3+) Staphylococcus aureus, MRSA     Comment: Methicillin resistant Staphylococcus aureus, Patient may be an isolation risk.        Gram Stain Moderate (3+) WBCs per low power field      Few (2+) Gram positive cocci    Narrative:      Refer to previous wound culture collected on  07/27/21 for 12:34 MICs.    Anaerobic Culture - Wound, Abdominal Wall [[id number]] Collected: 07/29/21 1147    Lab Status: Final result Specimen: Wound from Abdominal Wall Updated: 08/03/21 0958     Anaerobic Culture No anaerobes isolated at 5 days    Wound Culture - Wound, Abdominal Wall [[id number]]  (Abnormal) Collected: 07/29/21 1147    Lab Status: Final result Specimen: Wound from Abdominal Wall Updated: 07/31/21 0723     Wound Culture Moderate growth (3+) Staphylococcus aureus, MRSA     Comment: Methicillin resistant Staphylococcus aureus, Patient may be an isolation risk.        Gram Stain Moderate (3+) WBCs per low power field      Moderate (3+) Gram positive cocci    Narrative:      Refer to previous wound culture collected on  07/27/21 for 12:34 MICs.    COVID PRE-OP / PRE-PROCEDURE SCREENING ORDER (NO ISOLATION) - Swab, Nasopharynx [[id number]]  (Normal) Collected: 07/28/21 0056    Lab Status: Final result Specimen: Swab from Nasopharynx Updated: 07/28/21 1500     Narrative:      The following orders were created for panel order COVID PRE-OP / PRE-PROCEDURE SCREENING ORDER (NO ISOLATION) - Swab, Nasopharynx.  Procedure                               Abnormality         Status                     ---------                               -----------         ------                     COVID-19,APTIMA PANTHER,...[[id number]]  Normal              Final result                 Please view results for these tests on the individual orders.    COVID-19,APTIMA PANTHER,[facility] IN-HOUSE, NP/OP SWAB IN UTM/VTM/[facility] TRANSPORT MEDIA,24 HR TAT - Swab, Nasopharynx [[id number]]  (Normal) Collected: 07/28/21 0056    Lab Status: Final result Specimen: Swab from Nasopharynx Updated: 07/28/21 1500     COVID19 Not Detected    Narrative:      Fact sheet for providers: https://www.fda.gov/media/032162/download     Fact sheet for patients: https://www.fda.gov/media/508724/download    Test performed by RT PCR.    Blood Culture - Blood, Arm, Right [[id number]] Collected: 07/27/21 1236    Lab Status: Final result Specimen: Blood from Arm, Right Updated: 08/01/21 1300     Blood Culture No growth at 5 days    Blood Culture - Blood, Arm, Left [[id number]] Collected: 07/27/21 1236    Lab Status: Final result Specimen: Blood from Arm, Left Updated: 08/01/21 1300     Blood Culture No growth at 5 days    Wound Culture - Wound, Abdominal Wall [[id number]]  (Abnormal)  (Susceptibility) Collected: 07/27/21 1234    Lab Status: Final result Specimen: Wound from Abdominal Wall Updated: 07/30/21 1056     Wound Culture Scant growth (1+) Staphylococcus aureus, MRSA     Gram Stain Many (4+) WBCs per low power field      Few (2+) Gram positive cocci, intracellular and extracellular    Susceptibility      Staphylococcus aureus, MRSA      YASMIN      Clindamycin Resistant      Erythromycin Resistant      Inducible Clindamycin Resistance Negative      Oxacillin Resistant      Penicillin G Resistant      Rifampin Susceptible       Tetracycline Susceptible      Trimethoprim + Sulfamethoxazole Susceptible      Vancomycin Susceptible               Linear View                         Medication Review:       Schedule Meds  apixaban, 5 mg, Oral, Q12H  aspirin, 81 mg, Oral, Daily  atorvastatin, 40 mg, Oral, QAM  budesonide, 0.5 mg, Nebulization, BID - RT  clopidogrel, 75 mg, Oral, QAM  docusate sodium, 100 mg, Oral, BID  furosemide, 20 mg, Oral, QAM  insulin lispro, 0-7 Units, Subcutaneous, TID AC  ipratropium-albuterol, 3 mL, Nebulization, Q6H - RT  losartan, 50 mg, Oral, QAM  metoprolol tartrate, 25 mg, Oral, Q12H  mirtazapine, 30 mg, Oral, Nightly  potassium chloride, 10 mEq, Oral, TID With Meals  pregabalin, 150 mg, Oral, BID  rOPINIRole, 0.5 mg, Oral, Nightly  traZODone, 100 mg, Oral, Nightly  vancomycin, 750 mg, Intravenous, Q12H        Infusion Meds  Pharmacy to dose vancomycin,   sodium chloride, 100 mL/hr, Last Rate: 100 mL/hr (08/03/21 0152)        PRN Meds  •  acetaminophen **OR** acetaminophen  •  alteplase 2 mg vial + sterile water for injection  •  bisacodyl  •  calcium carbonate  •  dextrose  •  dextrose  •  docusate sodium  •  glucagon (human recombinant)  •  hydrALAZINE  •  HYDROcodone-acetaminophen  •  HYDROcodone-acetaminophen  •  HYDROmorphone **AND** naloxone  •  insulin lispro **AND** insulin lispro  •  ipratropium-albuterol  •  ketorolac  •  magnesium sulfate **OR** magnesium sulfate in D5W 1g/100mL (PREMIX)  •  melatonin  •  metoclopramide  •  ondansetron **OR** ondansetron  •  oxyCODONE  •  Pharmacy to dose vancomycin  •  potassium chloride **OR** potassium chloride **OR** potassium chloride  •  promethazine **OR** promethazine  •  [COMPLETED] Insert peripheral IV **AND** sodium chloride        Assessment/Plan       Antimicrobial Therapy   1.       Day  2.      Day  3.      Day  4.      Day  5.      Day      Assessment  Abdominal wound infection with MRSA.  The patient is s/p excision abdominal wound debridement on July 29,  2021 and had another debridement on August 1, 2021.  Wound culture from July 29, 2021 is growing MRSA.  Culture from August 1, 2021 are pending but Gram stain showed gram-positive cocci in clusters    Recent bacteremia with MRSA secondary to above the patient was hospitalized 2 weeks ago with MRSA bacteremia and was started on IV vancomycin.  Blood culture from July 16, 2021 was positive for MRSA.  Repeat blood culture from July 27, 2021 is negative x2 sets  The patient currently has a PICC line in the right arm for IV antibiotics.  Transesophageal echo from last admission on July 22, 2021 was negative for vegetation    History of pacemaker placement in the past      Plan    Recommend to continue IV vancomycin for 4 weeks from the last surgical debridement and try to keep vancomycin trough between 15 and 20, the last day of treatment will be August 28, 2021  Continue supportive care  A.mThania Morton MD  08/03/21  15:26 EDT  Note is dictated utilizing voice recognition software/Dragon

## 2021-08-03 NOTE — TELEPHONE ENCOUNTER
Caller: LONI SIDHU      Relationship: WIFE    Best call back number: [phone]   Medication needed:   oxyCODONE-acetaminophen (PERCOCET)  MG per tablet     When do you need the refill by: ASAP    What additional details did the patient provide when requesting the medication: PATIENT IS IN THE HOSPITAL. WIFE SAYS THAT HE IS LIKELY TO COME HOME Wednesday OR Thursday. HE ONLY HAS 1 DAY OF PAIN MEDICATION LEFT WHEN HE GETS HOME. PATIENT IS IN A  FACILITY AND RECORDS ARE IN CHART.       Does the patient have less than a 3 day supply:  [x] Yes  [] No    What is the patient's preferred pharmacy:    [facility] - [place], [place] - [address] - [phone] [facility] [phone] FX

## 2021-08-03 NOTE — PLAN OF CARE
Goal Outcome Evaluation:              Outcome Summary: pt doing well today, dressing changed and Dr. Wang will eval for discharge plan tomorrow.

## 2021-08-03 NOTE — PROGRESS NOTES
LOS: 5 days   Patient Care Team:  Ysuuf Jones MD as PCP - General  Joshua Yip MD as Consulting Physician (Cardiology)  Moise Watson MD as Consulting Physician (Cardiac Electrophysiology)  Izzy Alvarez MD as Consulting Physician (Nephrology)  Celine Swanson MD as Consulting Physician (Cardiology)    Reason for follow-up: Postop    Subjective   Patient seen and examined.  Feeling better    Objective   Wound inspected and is clean dry and intact.  Using Aquacel silver to it presently    Vital Signs  Vitals:    08/03/21 0814 08/03/21 1200 08/03/21 1245 08/03/21 1248   BP:  142/80     BP Location:  Right arm     Patient Position:  Lying     Pulse: 76 75 75 74   Resp: 18 15 16 16   Temp:  97.8 °F (36.6 °C)     TempSrc:  Oral     SpO2:  96% 96%    Weight:       Height:             Results Review:       Lab Results (last 24 hours)     Procedure Component Value Units Date/Time    POC Glucose Once [[id number]]  (Abnormal) Collected: 08/03/21 1650    Specimen: Blood Updated: 08/03/21 1651     Glucose 142 mg/dL      Comment: Serial Number: [id number]:  [id number]       POC Glucose Once [[id number]]  (Abnormal) Collected: 08/03/21 1133    Specimen: Blood Updated: 08/03/21 1145     Glucose 176 mg/dL      Comment: Serial Number: [id number]:  [id number]       POC Glucose Once [[id number]]  (Abnormal) Collected: 08/03/21 0713    Specimen: Blood Updated: 08/03/21 1144     Glucose 130 mg/dL      Comment: Serial Number: [id number]:  [id number]       POC Glucose Once [[id number]]  (Abnormal) Collected: 08/02/21 1613    Specimen: Blood Updated: 08/03/21 1143     Glucose 240 mg/dL      Comment: Serial Number: [id number]:  [id number]       POC Glucose Once [[id number]]  (Abnormal) Collected: 08/02/21 1151    Specimen: Blood Updated: 08/03/21 1143     Glucose 156 mg/dL      Comment: Serial Number: [id number]:  [id number]       Anaerobic Culture - Wound, Abdominal Wall  [[id number]] Collected: 07/29/21 1148    Specimen: Wound from Abdominal Wall Updated: 08/03/21 0958     Anaerobic Culture No anaerobes isolated at 5 days    Anaerobic Culture - Wound, Abdominal Wall [[id number]] Collected: 07/29/21 1147    Specimen: Wound from Abdominal Wall Updated: 08/03/21 0958     Anaerobic Culture No anaerobes isolated at 5 days    Tissue / Bone Culture - Tissue, Abdominal Wall [[id number]]  (Abnormal) Collected: 08/01/21 1313    Specimen: Tissue from Abdominal Wall Updated: 08/03/21 0826     Tissue Culture Moderate growth (3+) Staphylococcus aureus, MRSA     Comment: Methicillin resistant Staphylococcus aureus, Patient may be an isolation risk.        Gram Stain Many (4+) WBCs per low power field      Many (4+) Gram positive cocci in clusters    Comprehensive Metabolic Panel [[id number]]  (Abnormal) Collected: 08/03/21 0431    Specimen: Blood Updated: 08/03/21 0500     Glucose 132 mg/dL      BUN 10 mg/dL      Creatinine 1.01 mg/dL      Sodium 136 mmol/L      Potassium 4.1 mmol/L      Chloride 101 mmol/L      CO2 26.0 mmol/L      Calcium 8.4 mg/dL      Total Protein 6.2 g/dL      Albumin 3.00 g/dL      ALT (SGPT) 13 U/L      AST (SGOT) 17 U/L      Alkaline Phosphatase 237 U/L      Total Bilirubin 0.5 mg/dL      eGFR Non African Amer 72 mL/min/1.73      Globulin 3.2 gm/dL      A/G Ratio 0.9 g/dL      BUN/Creatinine Ratio 9.9     Anion Gap 9.0 mmol/L     Narrative:      GFR Normal >60  Chronic Kidney Disease <60  Kidney Failure <15      CBC & Differential [[id number]]  (Abnormal) Collected: 08/03/21 0430    Specimen: Blood Updated: 08/03/21 0440    Narrative:      The following orders were created for panel order CBC & Differential.  Procedure                               Abnormality         Status                     ---------                               -----------         ------                     CBC Auto Differential[[id number]]        Abnormal            Final result                 Please  view results for these tests on the individual orders.    CBC Auto Differential [[id number]]  (Abnormal) Collected: 08/03/21 0430    Specimen: Blood Updated: 08/03/21 0440     WBC 7.80 10*3/mm3      RBC 4.12 10*6/mm3      Hemoglobin 11.2 g/dL      Hematocrit 33.4 %      MCV 81.2 fL      MCH 27.2 pg      MCHC 33.5 g/dL      RDW 17.9 %      RDW-SD 51.2 fl      MPV 7.3 fL      Platelets 456 10*3/mm3      Neutrophil % 68.7 %      Lymphocyte % 21.0 %      Monocyte % 5.8 %      Eosinophil % 4.2 %      Basophil % 0.3 %      Neutrophils, Absolute 5.40 10*3/mm3      Lymphocytes, Absolute 1.60 10*3/mm3      Monocytes, Absolute 0.50 10*3/mm3      Eosinophils, Absolute 0.30 10*3/mm3      Basophils, Absolute 0.00 10*3/mm3      nRBC 0.0 /100 WBC     POC Glucose Once [[id number]]  (Abnormal) Collected: 08/02/21 2143    Specimen: Blood Updated: 08/02/21 2144     Glucose 145 mg/dL      Comment: Serial Number: [id number]:  [id number]              Imaging Results (Last 24 Hours)     ** No results found for the last 24 hours. **          Medication Review:     Assessment/Plan         Cellulitis of abdominal wall    Impression: Postop day 2 repeat debridement of necrotic skin and subcu tissue.  Dr. Wang returns tomorrow          Hill Bhatia DO  08/03/21  17:10 EDT

## 2021-08-04 LAB
ALBUMIN SERPL-MCNC: 3 G/DL (ref 3.5–5.2)
ALBUMIN/GLOB SERPL: 1 G/DL
ALP SERPL-CCNC: 226 U/L (ref 39–117)
ALT SERPL W P-5'-P-CCNC: 12 U/L (ref 1–41)
ANION GAP SERPL CALCULATED.3IONS-SCNC: 9 MMOL/L (ref 5–15)
AST SERPL-CCNC: 15 U/L (ref 1–40)
BACTERIA SPEC AEROBE CULT: ABNORMAL
BASOPHILS # BLD AUTO: 0.1 10*3/MM3 (ref 0–0.2)
BASOPHILS NFR BLD AUTO: 0.6 % (ref 0–1.5)
BILIRUB SERPL-MCNC: 0.6 MG/DL (ref 0–1.2)
BUN SERPL-MCNC: 10 MG/DL (ref 8–23)
BUN/CREAT SERPL: 10.3 (ref 7–25)
CALCIUM SPEC-SCNC: 8.5 MG/DL (ref 8.6–10.5)
CHLORIDE SERPL-SCNC: 102 MMOL/L (ref 98–107)
CO2 SERPL-SCNC: 26 MMOL/L (ref 22–29)
CREAT SERPL-MCNC: 0.97 MG/DL (ref 0.76–1.27)
DEPRECATED RDW RBC AUTO: 50.8 FL (ref 37–54)
EOSINOPHIL # BLD AUTO: 0.3 10*3/MM3 (ref 0–0.4)
EOSINOPHIL NFR BLD AUTO: 3.4 % (ref 0.3–6.2)
ERYTHROCYTE [DISTWIDTH] IN BLOOD BY AUTOMATED COUNT: 17.9 % (ref 12.3–15.4)
GFR SERPL CREATININE-BSD FRML MDRD: 76 ML/MIN/1.73
GLOBULIN UR ELPH-MCNC: 3.1 GM/DL
GLUCOSE BLDC GLUCOMTR-MCNC: 162 MG/DL (ref 70–105)
GLUCOSE BLDC GLUCOMTR-MCNC: 170 MG/DL (ref 70–105)
GLUCOSE BLDC GLUCOMTR-MCNC: 183 MG/DL (ref 70–105)
GLUCOSE BLDC GLUCOMTR-MCNC: 213 MG/DL (ref 70–105)
GLUCOSE SERPL-MCNC: 145 MG/DL (ref 65–99)
GRAM STN SPEC: ABNORMAL
GRAM STN SPEC: ABNORMAL
HCT VFR BLD AUTO: 33.5 % (ref 37.5–51)
HGB BLD-MCNC: 11.3 G/DL (ref 13–17.7)
LYMPHOCYTES # BLD AUTO: 1.4 10*3/MM3 (ref 0.7–3.1)
LYMPHOCYTES NFR BLD AUTO: 15.7 % (ref 19.6–45.3)
MCH RBC QN AUTO: 27.6 PG (ref 26.6–33)
MCHC RBC AUTO-ENTMCNC: 33.7 G/DL (ref 31.5–35.7)
MCV RBC AUTO: 81.8 FL (ref 79–97)
MONOCYTES # BLD AUTO: 0.7 10*3/MM3 (ref 0.1–0.9)
MONOCYTES NFR BLD AUTO: 7.9 % (ref 5–12)
NEUTROPHILS NFR BLD AUTO: 6.3 10*3/MM3 (ref 1.7–7)
NEUTROPHILS NFR BLD AUTO: 72.4 % (ref 42.7–76)
NRBC BLD AUTO-RTO: 0.1 /100 WBC (ref 0–0.2)
PLATELET # BLD AUTO: 456 10*3/MM3 (ref 140–450)
PMV BLD AUTO: 7.3 FL (ref 6–12)
POTASSIUM SERPL-SCNC: 4 MMOL/L (ref 3.5–5.2)
PROT SERPL-MCNC: 6.1 G/DL (ref 6–8.5)
RBC # BLD AUTO: 4.1 10*6/MM3 (ref 4.14–5.8)
SODIUM SERPL-SCNC: 137 MMOL/L (ref 136–145)
VANCOMYCIN TROUGH SERPL-MCNC: 16.6 MCG/ML (ref 5–20)
VANCOMYCIN TROUGH SERPL-MCNC: 20.6 MCG/ML (ref 5–20)
WBC # BLD AUTO: 8.7 10*3/MM3 (ref 3.4–10.8)

## 2021-08-04 PROCEDURE — 80053 COMPREHEN METABOLIC PANEL: CPT | Performed by: SURGERY

## 2021-08-04 PROCEDURE — 99024 POSTOP FOLLOW-UP VISIT: CPT | Performed by: SURGERY

## 2021-08-04 PROCEDURE — 94799 UNLISTED PULMONARY SVC/PX: CPT

## 2021-08-04 PROCEDURE — 25010000002 HYDROMORPHONE PER 4 MG: Performed by: SURGERY

## 2021-08-04 PROCEDURE — 85025 COMPLETE CBC W/AUTO DIFF WBC: CPT | Performed by: SURGERY

## 2021-08-04 PROCEDURE — 63710000001 INSULIN LISPRO (HUMAN) PER 5 UNITS: Performed by: SURGERY

## 2021-08-04 PROCEDURE — 80202 ASSAY OF VANCOMYCIN: CPT | Performed by: FAMILY MEDICINE

## 2021-08-04 PROCEDURE — 82962 GLUCOSE BLOOD TEST: CPT

## 2021-08-04 RX ADMIN — METOPROLOL TARTRATE 25 MG: 25 TABLET, FILM COATED ORAL at 08:07

## 2021-08-04 RX ADMIN — IPRATROPIUM BROMIDE AND ALBUTEROL SULFATE 3 ML: 2.5; .5 SOLUTION RESPIRATORY (INHALATION) at 12:46

## 2021-08-04 RX ADMIN — POTASSIUM CHLORIDE 10 MEQ: 750 TABLET, EXTENDED RELEASE ORAL at 08:08

## 2021-08-04 RX ADMIN — INSULIN LISPRO 3 UNITS: 100 INJECTION, SOLUTION INTRAVENOUS; SUBCUTANEOUS at 17:50

## 2021-08-04 RX ADMIN — APIXABAN 5 MG: 5 TABLET, FILM COATED ORAL at 23:32

## 2021-08-04 RX ADMIN — INSULIN LISPRO 2 UNITS: 100 INJECTION, SOLUTION INTRAVENOUS; SUBCUTANEOUS at 12:36

## 2021-08-04 RX ADMIN — MIRTAZAPINE 30 MG: 15 TABLET, FILM COATED ORAL at 20:13

## 2021-08-04 RX ADMIN — HYDROMORPHONE HYDROCHLORIDE 0.5 MG: 2 INJECTION, SOLUTION INTRAMUSCULAR; INTRAVENOUS; SUBCUTANEOUS at 02:58

## 2021-08-04 RX ADMIN — APIXABAN 5 MG: 5 TABLET, FILM COATED ORAL at 11:58

## 2021-08-04 RX ADMIN — HYDROMORPHONE HYDROCHLORIDE 0.5 MG: 2 INJECTION, SOLUTION INTRAMUSCULAR; INTRAVENOUS; SUBCUTANEOUS at 20:11

## 2021-08-04 RX ADMIN — Medication 10 ML: at 08:08

## 2021-08-04 RX ADMIN — DOCUSATE SODIUM 100 MG: 100 CAPSULE, LIQUID FILLED ORAL at 08:07

## 2021-08-04 RX ADMIN — POTASSIUM CHLORIDE 10 MEQ: 750 TABLET, EXTENDED RELEASE ORAL at 17:50

## 2021-08-04 RX ADMIN — CLOPIDOGREL BISULFATE 75 MG: 75 TABLET ORAL at 08:08

## 2021-08-04 RX ADMIN — IPRATROPIUM BROMIDE AND ALBUTEROL SULFATE 3 ML: 2.5; .5 SOLUTION RESPIRATORY (INHALATION) at 07:48

## 2021-08-04 RX ADMIN — HYDROMORPHONE HYDROCHLORIDE 0.5 MG: 2 INJECTION, SOLUTION INTRAMUSCULAR; INTRAVENOUS; SUBCUTANEOUS at 09:02

## 2021-08-04 RX ADMIN — ROPINIROLE HYDROCHLORIDE 0.5 MG: 0.25 TABLET, FILM COATED ORAL at 20:12

## 2021-08-04 RX ADMIN — PREGABALIN 150 MG: 75 CAPSULE ORAL at 20:12

## 2021-08-04 RX ADMIN — DOCUSATE SODIUM 100 MG: 100 CAPSULE, LIQUID FILLED ORAL at 20:12

## 2021-08-04 RX ADMIN — LOSARTAN POTASSIUM 50 MG: 50 TABLET, FILM COATED ORAL at 08:07

## 2021-08-04 RX ADMIN — TRAZODONE HYDROCHLORIDE 100 MG: 100 TABLET ORAL at 20:12

## 2021-08-04 RX ADMIN — HYDROMORPHONE HYDROCHLORIDE 0.5 MG: 2 INJECTION, SOLUTION INTRAMUSCULAR; INTRAVENOUS; SUBCUTANEOUS at 15:35

## 2021-08-04 RX ADMIN — INSULIN LISPRO 2 UNITS: 100 INJECTION, SOLUTION INTRAVENOUS; SUBCUTANEOUS at 08:07

## 2021-08-04 RX ADMIN — SODIUM CHLORIDE 100 ML/HR: 9 INJECTION, SOLUTION INTRAVENOUS at 12:00

## 2021-08-04 RX ADMIN — POTASSIUM CHLORIDE 10 MEQ: 750 TABLET, EXTENDED RELEASE ORAL at 11:58

## 2021-08-04 RX ADMIN — FUROSEMIDE 20 MG: 20 TABLET ORAL at 08:08

## 2021-08-04 RX ADMIN — BUDESONIDE 0.5 MG: 0.5 SUSPENSION RESPIRATORY (INHALATION) at 07:54

## 2021-08-04 RX ADMIN — METOPROLOL TARTRATE 25 MG: 25 TABLET, FILM COATED ORAL at 23:33

## 2021-08-04 RX ADMIN — HYDROMORPHONE HYDROCHLORIDE 0.5 MG: 2 INJECTION, SOLUTION INTRAMUSCULAR; INTRAVENOUS; SUBCUTANEOUS at 06:32

## 2021-08-04 RX ADMIN — ATORVASTATIN CALCIUM 40 MG: 40 TABLET, FILM COATED ORAL at 08:08

## 2021-08-04 RX ADMIN — HYDROMORPHONE HYDROCHLORIDE 0.5 MG: 2 INJECTION, SOLUTION INTRAMUSCULAR; INTRAVENOUS; SUBCUTANEOUS at 11:59

## 2021-08-04 RX ADMIN — BUDESONIDE 0.5 MG: 0.5 SUSPENSION RESPIRATORY (INHALATION) at 20:20

## 2021-08-04 RX ADMIN — SODIUM CHLORIDE 100 ML/HR: 9 INJECTION, SOLUTION INTRAVENOUS at 01:03

## 2021-08-04 RX ADMIN — IPRATROPIUM BROMIDE AND ALBUTEROL SULFATE 3 ML: 2.5; .5 SOLUTION RESPIRATORY (INHALATION) at 20:20

## 2021-08-04 RX ADMIN — HYDROMORPHONE HYDROCHLORIDE 0.5 MG: 2 INJECTION, SOLUTION INTRAMUSCULAR; INTRAVENOUS; SUBCUTANEOUS at 23:33

## 2021-08-04 RX ADMIN — ASPIRIN 81 MG: 81 TABLET, COATED ORAL at 08:07

## 2021-08-04 RX ADMIN — PREGABALIN 150 MG: 75 CAPSULE ORAL at 08:07

## 2021-08-04 NOTE — PROGRESS NOTES
LOS: 6 days   Patient Care Team:  Yusuf Jones MD as PCP - General  PhiJoshua MD as Consulting Physician (Cardiology)  Moise Watson MD as Consulting Physician (Cardiac Electrophysiology)  Izzy Alvarez MD as Consulting Physician (Nephrology)  Celine Swanson MD as Consulting Physician (Cardiology)    Subjective     Interval History:    Patient Complaints: better concerned about going home and having everything in place for wound care     History taken from: patient    Review of Systems   Constitutional: Negative for appetite change and fever.   HENT: Negative for trouble swallowing.    Respiratory: Negative for cough and shortness of breath.    Cardiovascular: Negative for chest pain, palpitations and leg swelling.   Gastrointestinal: Positive for abdominal pain (pain at the site of the wound- managable ). Negative for constipation, diarrhea, nausea and vomiting.   Genitourinary: Negative for difficulty urinating and dysuria.   Musculoskeletal: Negative for gait problem.   Skin: Positive for wound. Negative for pallor.   Neurological: Negative for headaches.   Psychiatric/Behavioral: Negative for confusion.           Objective     Vital Signs  Temp:  [97.3 °F (36.3 °C)-97.8 °F (36.6 °C)] 97.3 °F (36.3 °C)  Heart Rate:  [68-75] 70  Resp:  [15-16] 16  BP: (139-167)/(80-92) 167/92    Physical Exam:     General Appearance:    Alert, cooperative, in no acute distress, obese   Head:    Normocephalic, without obvious abnormality, atraumatic   Eyes:            Lids and lashes normal, conjunctivae and sclerae normal, no   icterus, no pallor, corneas clear, PERRLA   Ears:    Ears appear intact with no abnormalities noted   Throat:   No oral lesions, no thrush, oral mucosa moist   Neck:   No adenopathy, supple, trachea midline, no thyromegaly, no   carotid bruit, no JVD   Lungs:     Clear to auscultation,respirations regular, even and                  unlabored    Heart:    Regular  rhythm and normal rate, normal S1 and S2, no            murmur, no gallop, no rub, no click   Chest Wall:    No abnormalities observed   Abdomen:    obese hernias apparent-  dressing removed to the large abd wall wound-  There is a silver like product apparent I did not attempt to remove this. Min surround erythema    Extremities:   Moves all extremities well, no edema, no cyanosis, no             Redness   Pulses:   Pulses palpable and equal bilaterally   Skin:   No bleeding, bruising or rash   Lymph nodes:   No palpable adenopathy   Neurologic:   Cranial nerves 2 - 12 grossly intact, sensation intact, DTR       present and equal bilaterally        Results Review:    Lab Results (last 24 hours)     Procedure Component Value Units Date/Time    Tissue / Bone Culture - Tissue, Abdominal Wall [[id number]]  (Abnormal)  (Susceptibility) Collected: 08/01/21 1313    Specimen: Tissue from Abdominal Wall Updated: 08/04/21 0608     Tissue Culture Moderate growth (3+) Staphylococcus aureus, MRSA     Comment: Methicillin resistant Staphylococcus aureus, Patient may be an isolation risk.        Gram Stain Many (4+) WBCs per low power field      Many (4+) Gram positive cocci in clusters    Susceptibility      Staphylococcus aureus, MRSA      YASMIN      Clindamycin Resistant      Erythromycin Resistant      Inducible Clindamycin Resistance Negative      Oxacillin Resistant      Penicillin G Resistant      Rifampin Susceptible      Tetracycline Susceptible      Trimethoprim + Sulfamethoxazole Susceptible      Vancomycin Susceptible               Linear View                   Comprehensive Metabolic Panel [[id number]]  (Abnormal) Collected: 08/04/21 0257    Specimen: Blood Updated: 08/04/21 0332     Glucose 145 mg/dL      BUN 10 mg/dL      Creatinine 0.97 mg/dL      Sodium 137 mmol/L      Potassium 4.0 mmol/L      Chloride 102 mmol/L      CO2 26.0 mmol/L      Calcium 8.5 mg/dL      Total Protein 6.1 g/dL      Albumin 3.00 g/dL      ALT  (SGPT) 12 U/L      AST (SGOT) 15 U/L      Alkaline Phosphatase 226 U/L      Total Bilirubin 0.6 mg/dL      eGFR Non African Amer 76 mL/min/1.73      Globulin 3.1 gm/dL      A/G Ratio 1.0 g/dL      BUN/Creatinine Ratio 10.3     Anion Gap 9.0 mmol/L     Narrative:      GFR Normal >60  Chronic Kidney Disease <60  Kidney Failure <15      Vancomycin, Trough [[id number]]  (Abnormal) Collected: 08/04/21 0257    Specimen: Blood Updated: 08/04/21 0332     Vancomycin Trough 20.60 mcg/mL     Narrative:      Therapeutic Ranges for Vancomycin    Vancomycin Random   5.0-40.0 mcg/mL  Vancomycin Trough   5.0-20.0 mcg/mL  Vancomycin Peak     20.0-40.0 mcg/mL    CBC & Differential [[id number]]  (Abnormal) Collected: 08/04/21 0257    Specimen: Blood Updated: 08/04/21 0308    Narrative:      The following orders were created for panel order CBC & Differential.  Procedure                               Abnormality         Status                     ---------                               -----------         ------                     CBC Auto Differential[[id number]]        Abnormal            Final result                 Please view results for these tests on the individual orders.    CBC Auto Differential [[id number]]  (Abnormal) Collected: 08/04/21 0257    Specimen: Blood Updated: 08/04/21 0308     WBC 8.70 10*3/mm3      RBC 4.10 10*6/mm3      Hemoglobin 11.3 g/dL      Hematocrit 33.5 %      MCV 81.8 fL      MCH 27.6 pg      MCHC 33.7 g/dL      RDW 17.9 %      RDW-SD 50.8 fl      MPV 7.3 fL      Platelets 456 10*3/mm3      Neutrophil % 72.4 %      Lymphocyte % 15.7 %      Monocyte % 7.9 %      Eosinophil % 3.4 %      Basophil % 0.6 %      Neutrophils, Absolute 6.30 10*3/mm3      Lymphocytes, Absolute 1.40 10*3/mm3      Monocytes, Absolute 0.70 10*3/mm3      Eosinophils, Absolute 0.30 10*3/mm3      Basophils, Absolute 0.10 10*3/mm3      nRBC 0.1 /100 WBC     POC Glucose Once [[id number]]  (Abnormal) Collected: 08/03/21 2155    Specimen:  Blood Updated: 08/03/21 2156     Glucose 202 mg/dL      Comment: Serial Number: [id number]:  [id number]       POC Glucose Once [[id number]]  (Abnormal) Collected: 08/03/21 1650    Specimen: Blood Updated: 08/03/21 1651     Glucose 142 mg/dL      Comment: Serial Number: [id number]:  [id number]              Imaging Results (Last 24 Hours)     ** No results found for the last 24 hours. **               I reviewed the patient's new clinical results.    Medication Review:   Scheduled Meds:apixaban, 5 mg, Oral, Q12H  aspirin, 81 mg, Oral, Daily  atorvastatin, 40 mg, Oral, QAM  budesonide, 0.5 mg, Nebulization, BID - RT  clopidogrel, 75 mg, Oral, QAM  docusate sodium, 100 mg, Oral, BID  furosemide, 20 mg, Oral, QAM  insulin lispro, 0-7 Units, Subcutaneous, TID AC  ipratropium-albuterol, 3 mL, Nebulization, Q6H - RT  losartan, 50 mg, Oral, QAM  metoprolol tartrate, 25 mg, Oral, Q12H  mirtazapine, 30 mg, Oral, Nightly  potassium chloride, 10 mEq, Oral, TID With Meals  pregabalin, 150 mg, Oral, BID  rOPINIRole, 0.5 mg, Oral, Nightly  traZODone, 100 mg, Oral, Nightly  vancomycin, 750 mg, Intravenous, Q12H      Continuous Infusions:Pharmacy to dose vancomycin,   sodium chloride, 100 mL/hr, Last Rate: 100 mL/hr (08/04/21 0103)      PRN Meds:.•  acetaminophen **OR** acetaminophen  •  alteplase 2 mg vial + sterile water for injection  •  bisacodyl  •  calcium carbonate  •  dextrose  •  dextrose  •  docusate sodium  •  glucagon (human recombinant)  •  hydrALAZINE  •  HYDROcodone-acetaminophen  •  HYDROcodone-acetaminophen  •  HYDROmorphone **AND** naloxone  •  insulin lispro **AND** insulin lispro  •  ipratropium-albuterol  •  ketorolac  •  magnesium sulfate **OR** magnesium sulfate in D5W 1g/100mL (PREMIX)  •  melatonin  •  metoclopramide  •  ondansetron **OR** ondansetron  •  oxyCODONE  •  Pharmacy to dose vancomycin  •  potassium chloride **OR** potassium chloride **OR** potassium chloride  •  promethazine **OR**  promethazine  •  [COMPLETED] Insert peripheral IV **AND** sodium chloride     Assessment/Plan       Cellulitis of abdominal wall    POD day 2 further debridement of necrotic skin and subcutaneous tissue. Dr Wang to see the pt today . Pain is stable. ID following rec IV vanc for 4 weeks for last treatment to be 8/24    Hypoalbuminemia- nutrition consult added in for help with education on diet to increase protein and help with wound mgmt     Plan for disposition: Home with home health for wound care when able per surgery     KOURTNEY Pruitt  08/04/21  10:47 EDT

## 2021-08-04 NOTE — CASE MANAGEMENT/SOCIAL WORK
Continued Stay Note  KATLYN Bruneryd     Patient Name: Ro Nathan  MRN: [id number]  Today's Date: 8/4/2021    Admit Date: 7/27/2021    Discharge Plan     Row Name 08/04/21 1104       Plan    Plan  D/C Plan: Home with [facility] (current, [facility] order placed) and [facility] for IV abx.    Plan Comments  Barrier to D/C: POD#3 debridement of abd wall, IV abx, IVFs, IV pain meds.          Expected Discharge Date and Time     Expected Discharge Date Expected Discharge Time    Aug 5, 2021             Yanelis Sultana

## 2021-08-04 NOTE — PROGRESS NOTES
Infectious Diseases Progress Note      LOS: 6 days   Patient Care Team:  Yusuf Jones MD as PCP - General  [facility] as Consulting Physician (Cardiology)  Moise Watson MD as Consulting Physician (Cardiac Electrophysiology)  Izzy Alvarez MD as Consulting Physician (Nephrology)  Celine Swanson MD as Consulting Physician (Cardiology)    Chief Complaint: Abdominal wall wound    Subjective     The patient had no fever during the last 24 hours.  The patient remained hemodynamically stable.  The patient is complaining of mild abdominal pain. He is currently on room air.    Review of Systems:   Review of Systems   Constitutional: Negative.    HENT: Negative.    Eyes: Negative.    Respiratory: Negative.    Cardiovascular: Negative.    Gastrointestinal: Negative.    Genitourinary: Negative.    Musculoskeletal: Negative.    Skin: Positive for wound.   Neurological: Negative.    Hematological: Negative.    Psychiatric/Behavioral: Negative.         Objective     Vital Signs  Temp:  [97.3 °F (36.3 °C)-97.6 °F (36.4 °C)] 97.6 °F (36.4 °C)  Heart Rate:  [68-86] 86  Resp:  [15-16] 15  BP: (139-180)/(83-99) 180/99    Physical Exam:  Physical Exam  Vitals and nursing note reviewed.   Constitutional:       Appearance: He is well-developed.   HENT:      Head: Normocephalic and atraumatic.   Eyes:      Pupils: Pupils are equal, round, and reactive to light.   Cardiovascular:      Rate and Rhythm: Normal rate and regular rhythm.      Heart sounds: Normal heart sounds.   Pulmonary:      Effort: Pulmonary effort is normal. No respiratory distress.      Breath sounds: Normal breath sounds. No wheezing or rales.   Abdominal:      General: There is distension.      Palpations: Abdomen is soft. There is no mass.      Tenderness: There is no abdominal tenderness. There is no guarding or rebound.      Comments: Bulky surgical dressings present on abdomen wall.  No attempt to remove the surgical  dressings   Musculoskeletal:         General: No deformity. Normal range of motion.      Cervical back: Normal range of motion and neck supple.   Skin:     General: Skin is warm.      Findings: No erythema or rash.   Neurological:      Mental Status: He is alert and oriented to person, place, and time.      Cranial Nerves: No cranial nerve deficit.          Results Review:    I have reviewed all clinical data, test, lab, and imaging results.     Radiology  No Radiology Exams Resulted Within Past 24 Hours    Cardiology    Laboratory    Results from last 7 days   Lab Units 08/04/21  0257 08/03/21  0430 08/02/21  0338 08/01/21 0340 07/31/21  0245 07/30/21  0142 07/29/21  0759   WBC 10*3/mm3 8.70 7.80 6.60 7.70 8.00 10.40 9.20   HEMOGLOBIN g/dL 11.3* 11.2* 10.3* 11.4* 10.9* 11.3* 11.1*   HEMATOCRIT % 33.5* 33.4* 30.2* 34.2* 32.8* 34.2* 33.0*   PLATELETS 10*3/mm3 456* 456* 428 497* 476* 495* 441     Results from last 7 days   Lab Units 08/04/21  0257 08/03/21  0431 08/02/21 0338 08/01/21 0340 07/31/21  0245 07/30/21  0142 07/29/21  0759   SODIUM mmol/L 137 136 133* 135* 134* 134* 134*   POTASSIUM mmol/L 4.0 4.1 4.4 4.2 4.2 5.2 4.1   CHLORIDE mmol/L 102 101 101 101 99 100 99   CO2 mmol/L 26.0 26.0 26.0 25.0 25.0 24.0 25.0   BUN mg/dL 10 10 8 10 12 15 11   CREATININE mg/dL 0.97 1.01 0.84 0.89 0.95 1.02 0.84   GLUCOSE mg/dL 145* 132* 146* 135* 139* 191* 158*   ALBUMIN g/dL 3.00* 3.00* 2.70* 3.00* 2.90* 2.70*  --    BILIRUBIN mg/dL 0.6 0.5 0.5 0.6 0.4 0.6  --    ALK PHOS U/L 226* 237* 243* 277* 297* 350*  --    AST (SGOT) U/L 15 17 12 19 20 22  --    ALT (SGPT) U/L 12 13 13 18 23 34  --    CALCIUM mg/dL 8.5* 8.4* 8.4* 8.6 8.4* 8.4* 8.0*                 Microbiology   Microbiology Results (last 10 days)     Procedure Component Value - Date/Time    Tissue / Bone Culture - Tissue, Abdominal Wall [[id number]]  (Abnormal)  (Susceptibility) Collected: 08/01/21 1313    Lab Status: Final result Specimen: Tissue from Abdominal Wall  Updated: 08/04/21 0608     Tissue Culture Moderate growth (3+) Staphylococcus aureus, MRSA     Comment: Methicillin resistant Staphylococcus aureus, Patient may be an isolation risk.        Gram Stain Many (4+) WBCs per low power field      Many (4+) Gram positive cocci in clusters    Susceptibility      Staphylococcus aureus, MRSA      YASMIN      Clindamycin Resistant      Erythromycin Resistant      Inducible Clindamycin Resistance Negative      Oxacillin Resistant      Penicillin G Resistant      Rifampin Susceptible      Tetracycline Susceptible      Trimethoprim + Sulfamethoxazole Susceptible      Vancomycin Susceptible               Linear View                   Anaerobic Culture - Wound, Abdominal Wall [[id number]] Collected: 07/29/21 1148    Lab Status: Final result Specimen: Wound from Abdominal Wall Updated: 08/03/21 0958     Anaerobic Culture No anaerobes isolated at 5 days    Wound Culture - Wound, Abdominal Wall [[id number]]  (Abnormal) Collected: 07/29/21 1148    Lab Status: Final result Specimen: Wound from Abdominal Wall Updated: 07/31/21 0723     Wound Culture Moderate growth (3+) Staphylococcus aureus, MRSA     Comment: Methicillin resistant Staphylococcus aureus, Patient may be an isolation risk.        Gram Stain Moderate (3+) WBCs per low power field      Few (2+) Gram positive cocci    Narrative:      Refer to previous wound culture collected on  07/27/21 for 12:34 MICs.    Anaerobic Culture - Wound, Abdominal Wall [[id number]] Collected: 07/29/21 1147    Lab Status: Final result Specimen: Wound from Abdominal Wall Updated: 08/03/21 0958     Anaerobic Culture No anaerobes isolated at 5 days    Wound Culture - Wound, Abdominal Wall [[id number]]  (Abnormal) Collected: 07/29/21 1147    Lab Status: Final result Specimen: Wound from Abdominal Wall Updated: 07/31/21 0723     Wound Culture Moderate growth (3+) Staphylococcus aureus, MRSA     Comment: Methicillin resistant Staphylococcus aureus, Patient may  be an isolation risk.        Gram Stain Moderate (3+) WBCs per low power field      Moderate (3+) Gram positive cocci    Narrative:      Refer to previous wound culture collected on  07/27/21 for 12:34 MICs.    COVID PRE-OP / PRE-PROCEDURE SCREENING ORDER (NO ISOLATION) - Swab, Nasopharynx [[id number]]  (Normal) Collected: 07/28/21 0056    Lab Status: Final result Specimen: Swab from Nasopharynx Updated: 07/28/21 1500    Narrative:      The following orders were created for panel order COVID PRE-OP / PRE-PROCEDURE SCREENING ORDER (NO ISOLATION) - Swab, Nasopharynx.  Procedure                               Abnormality         Status                     ---------                               -----------         ------                     COVID-19,APTIMA PANTHER,...[[id number]]  Normal              Final result                 Please view results for these tests on the individual orders.    COVID-19,APTIMA PANTHER,[facility], NP/OP SWAB IN UTM/VTM/[facility] TRANSPORT MEDIA,24 HR TAT - Swab, Nasopharynx [[id number]]  (Normal) Collected: 07/28/21 0056    Lab Status: Final result Specimen: Swab from Nasopharynx Updated: 07/28/21 1500     COVID19 Not Detected    Narrative:      Fact sheet for providers: https://www.fda.gov/media/741606/download     Fact sheet for patients: https://www.fda.gov/media/505203/download    Test performed by RT PCR.    Blood Culture - Blood, Arm, Right [[id number]] Collected: 07/27/21 1236    Lab Status: Final result Specimen: Blood from Arm, Right Updated: 08/01/21 1300     Blood Culture No growth at 5 days    Blood Culture - Blood, Arm, Left [[id number]] Collected: 07/27/21 1236    Lab Status: Final result Specimen: Blood from Arm, Left Updated: 08/01/21 1300     Blood Culture No growth at 5 days    Wound Culture - Wound, Abdominal Wall [[id number]]  (Abnormal)  (Susceptibility) Collected: 07/27/21 1234    Lab Status: Final result Specimen: Wound from Abdominal Wall Updated: 07/30/21 1056     Wound  Culture Scant growth (1+) Staphylococcus aureus, MRSA     Gram Stain Many (4+) WBCs per low power field      Few (2+) Gram positive cocci, intracellular and extracellular    Susceptibility      Staphylococcus aureus, MRSA      YASMIN      Clindamycin Resistant      Erythromycin Resistant      Inducible Clindamycin Resistance Negative      Oxacillin Resistant      Penicillin G Resistant      Rifampin Susceptible      Tetracycline Susceptible      Trimethoprim + Sulfamethoxazole Susceptible      Vancomycin Susceptible               Linear View                         Medication Review:       Schedule Meds  apixaban, 5 mg, Oral, Q12H  aspirin, 81 mg, Oral, Daily  atorvastatin, 40 mg, Oral, QAM  budesonide, 0.5 mg, Nebulization, BID - RT  clopidogrel, 75 mg, Oral, QAM  docusate sodium, 100 mg, Oral, BID  furosemide, 20 mg, Oral, QAM  insulin lispro, 0-7 Units, Subcutaneous, TID AC  ipratropium-albuterol, 3 mL, Nebulization, Q6H - RT  losartan, 50 mg, Oral, QAM  metoprolol tartrate, 25 mg, Oral, Q12H  mirtazapine, 30 mg, Oral, Nightly  potassium chloride, 10 mEq, Oral, TID With Meals  pregabalin, 150 mg, Oral, BID  rOPINIRole, 0.5 mg, Oral, Nightly  traZODone, 100 mg, Oral, Nightly  [START ON 8/5/2021] vancomycin, 1,500 mg, Intravenous, Q24H        Infusion Meds  Pharmacy to dose vancomycin,         PRN Meds  •  acetaminophen **OR** acetaminophen  •  alteplase 2 mg vial + sterile water for injection  •  bisacodyl  •  calcium carbonate  •  dextrose  •  dextrose  •  docusate sodium  •  glucagon (human recombinant)  •  hydrALAZINE  •  HYDROcodone-acetaminophen  •  HYDROcodone-acetaminophen  •  HYDROmorphone **AND** naloxone  •  insulin lispro **AND** insulin lispro  •  ipratropium-albuterol  •  ketorolac  •  magnesium sulfate **OR** magnesium sulfate in D5W 1g/100mL (PREMIX)  •  melatonin  •  metoclopramide  •  ondansetron **OR** ondansetron  •  oxyCODONE  •  Pharmacy to dose vancomycin  •  potassium chloride **OR** potassium  chloride **OR** potassium chloride  •  promethazine **OR** promethazine  •  [COMPLETED] Insert peripheral IV **AND** sodium chloride        Assessment/Plan       Antimicrobial Therapy   1. Vancomycin      day  2.      Day  3.      Day  4.      Day  5.      Day      Assessment  Abdominal wound infection with MRSA.  The patient is s/p excision abdominal wound debridement on July 29, 2021 and had another debridement on August 1, 2021.  Wound culture from July 29, 2021 is growing MRSA.  Culture from August 1, 2021 are pending but Gram stain showed gram-positive cocci in clusters    Recent bacteremia with MRSA secondary to above the patient was hospitalized 2 weeks ago with MRSA bacteremia and was started on IV vancomycin.  Blood culture from July 16, 2021 was positive for MRSA.  Repeat blood culture from July 27, 2021 is negative x2 sets  The patient currently has a PICC line in the right arm for IV antibiotics.  Transesophageal echo from last admission on July 22, 2021 was negative for vegetation    History of pacemaker placement in the past      Plan    Recommend to continue IV vancomycin for 4 weeks from the last surgical debridement and try to keep vancomycin trough between 15 and 20, the last day of treatment will be August 28, 2021  Patient already has a PICC line in place-please remove once IV antibiotics are completed  Weekly labs including CBC/creatinine/sed rate for 3 weeks  Continue supportive care  Okay to discharge from Infectious Disease standpoint  Not much more to add from infectious disease standpoint-we will sign off at this time-please call with any questions.    Please fax all post discharge lab results, imaging studies and correspondence to this fax number [phone]  For any question or concern please contact our service number [phone]        KOURTNEY Leung  08/04/21  14:44 EDT  Note is dictated utilizing voice recognition software/Dragon

## 2021-08-04 NOTE — TELEPHONE ENCOUNTER
I haven't seen him for 3.5 months. Let's put him on for a phone visit if we can't get him in that day, thanks.

## 2021-08-04 NOTE — PLAN OF CARE
Goal Outcome Evaluation:  Plan of Care Reviewed With: patient        Progress: no change  Outcome Summary: pt c/o lower back and abdominal pain. treated per MAR. plan to change abdominal dressing after surgeon sees pt per pt request. pt has abdominal binder on. pt continued on IV antibiotics. PICC line in place.

## 2021-08-04 NOTE — PLAN OF CARE
Goal Outcome Evaluation:  Plan of Care Reviewed With: patient           Outcome Summary: Pt resting. No complaints at this time. VSS. Will continue to monitor.

## 2021-08-04 NOTE — PROGRESS NOTES
LOS: 6 days   Patient Care Team:  Yusuf Jones MD as PCP - General  Joshua Yip MD as Consulting Physician (Cardiology)  Moise Watson MD as Consulting Physician (Cardiac Electrophysiology)  Izzy Alvarez MD as Consulting Physician (Nephrology)  Celine Swanson MD as Consulting Physician (Cardiology)    Reason for follow-up: Postop    Subjective   Patient seen and examined.  Feeling better    Objective   Wound inspected and is clean dry and intact.  Using Aquacel silver to it presently    Vital Signs  Vitals:    08/04/21 0748 08/04/21 0753 08/04/21 0754 08/04/21 1200   BP:    180/99   BP Location:    Right arm   Patient Position:    Lying   Pulse:  72 70 86   Resp:  16 16 15   Temp:    97.6 °F (36.4 °C)   TempSrc:    Oral   SpO2: 98% 99% 98% 98%   Weight:       Height:             Results Review:       Lab Results (last 24 hours)     Procedure Component Value Units Date/Time    POC Glucose Once [[id number]]  (Abnormal) Collected: 08/04/21 0715    Specimen: Blood Updated: 08/04/21 1236     Glucose 162 mg/dL      Comment: Serial Number: [id number]:  [id number]       POC Glucose Once [[id number]]  (Abnormal) Collected: 08/04/21 1205    Specimen: Blood Updated: 08/04/21 1206     Glucose 170 mg/dL      Comment: Serial Number: [id number]:  [id number]       Vancomycin, Trough This is in addition to level collected this morning as this level was collected incorrectly. Thank you. [[id number]]  (Normal) Collected: 08/04/21 1043    Specimen: Blood Updated: 08/04/21 1140     Vancomycin Trough 16.60 mcg/mL     Narrative:      Therapeutic Ranges for Vancomycin    Vancomycin Random   5.0-40.0 mcg/mL  Vancomycin Trough   5.0-20.0 mcg/mL  Vancomycin Peak     20.0-40.0 mcg/mL    Tissue / Bone Culture - Tissue, Abdominal Wall [[id number]]  (Abnormal)  (Susceptibility) Collected: 08/01/21 1313    Specimen: Tissue from Abdominal Wall Updated: 08/04/21 0608     Tissue Culture Moderate  growth (3+) Staphylococcus aureus, MRSA     Comment: Methicillin resistant Staphylococcus aureus, Patient may be an isolation risk.        Gram Stain Many (4+) WBCs per low power field      Many (4+) Gram positive cocci in clusters    Susceptibility      Staphylococcus aureus, MRSA      YASMIN      Clindamycin Resistant      Erythromycin Resistant      Inducible Clindamycin Resistance Negative      Oxacillin Resistant      Penicillin G Resistant      Rifampin Susceptible      Tetracycline Susceptible      Trimethoprim + Sulfamethoxazole Susceptible      Vancomycin Susceptible               Linear View                   Comprehensive Metabolic Panel [[id number]]  (Abnormal) Collected: 08/04/21 0257    Specimen: Blood Updated: 08/04/21 0332     Glucose 145 mg/dL      BUN 10 mg/dL      Creatinine 0.97 mg/dL      Sodium 137 mmol/L      Potassium 4.0 mmol/L      Chloride 102 mmol/L      CO2 26.0 mmol/L      Calcium 8.5 mg/dL      Total Protein 6.1 g/dL      Albumin 3.00 g/dL      ALT (SGPT) 12 U/L      AST (SGOT) 15 U/L      Alkaline Phosphatase 226 U/L      Total Bilirubin 0.6 mg/dL      eGFR Non African Amer 76 mL/min/1.73      Globulin 3.1 gm/dL      A/G Ratio 1.0 g/dL      BUN/Creatinine Ratio 10.3     Anion Gap 9.0 mmol/L     Narrative:      GFR Normal >60  Chronic Kidney Disease <60  Kidney Failure <15      Vancomycin, Trough [[id number]]  (Abnormal) Collected: 08/04/21 0257    Specimen: Blood Updated: 08/04/21 0332     Vancomycin Trough 20.60 mcg/mL     Narrative:      Therapeutic Ranges for Vancomycin    Vancomycin Random   5.0-40.0 mcg/mL  Vancomycin Trough   5.0-20.0 mcg/mL  Vancomycin Peak     20.0-40.0 mcg/mL    CBC & Differential [[id number]]  (Abnormal) Collected: 08/04/21 0257    Specimen: Blood Updated: 08/04/21 0308    Narrative:      The following orders were created for panel order CBC & Differential.  Procedure                               Abnormality         Status                     ---------                                -----------         ------                     CBC Auto Differential[[id number]]        Abnormal            Final result                 Please view results for these tests on the individual orders.    CBC Auto Differential [[id number]]  (Abnormal) Collected: 08/04/21 0257    Specimen: Blood Updated: 08/04/21 0308     WBC 8.70 10*3/mm3      RBC 4.10 10*6/mm3      Hemoglobin 11.3 g/dL      Hematocrit 33.5 %      MCV 81.8 fL      MCH 27.6 pg      MCHC 33.7 g/dL      RDW 17.9 %      RDW-SD 50.8 fl      MPV 7.3 fL      Platelets 456 10*3/mm3      Neutrophil % 72.4 %      Lymphocyte % 15.7 %      Monocyte % 7.9 %      Eosinophil % 3.4 %      Basophil % 0.6 %      Neutrophils, Absolute 6.30 10*3/mm3      Lymphocytes, Absolute 1.40 10*3/mm3      Monocytes, Absolute 0.70 10*3/mm3      Eosinophils, Absolute 0.30 10*3/mm3      Basophils, Absolute 0.10 10*3/mm3      nRBC 0.1 /100 WBC     POC Glucose Once [[id number]]  (Abnormal) Collected: 08/03/21 2155    Specimen: Blood Updated: 08/03/21 2156     Glucose 202 mg/dL      Comment: Serial Number: [id number]:  [id number]              Imaging Results (Last 24 Hours)     ** No results found for the last 24 hours. **          Medication Review:     Assessment/Plan         Cellulitis of abdominal wall    Impression: Postop day 3 repeat debridement of necrotic skin and subcu tissue.  Wound is improving, no necrotic tissue, probably can go home with home health Wednesday.         Cleopatra Wang MD  08/04/21  14:53 EDT

## 2021-08-04 NOTE — PROGRESS NOTES
"Pharmacy Antimicrobial Dosing Service    Subjective:  Ro Nathan is a 75 y.o.male admitted with abdominal wall cellulitis. Pharmacy has been consulted to dose Vancomycin for possible sepsis and continuation of outpatient regimen. Patient recently admitted with MRSA bacteremia and discharged 3 days prior to current admission on vancomycin. Vancomycin to continue until 8/28 per ID recommendation last admission. Per Surgery, likely source of bacteremia is incarcerated ventral hernia with soft tissue necrosis of abdominal wall.     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 Day #9 Vancomycin (this admission): Goal -600 mcg*h/mL. Receiving 750 mg (~7.5 mg/kg DBW) IV q12h. Initial trough today drawn incorrectly, more representative of peak. Level = 20.6 mcg/mL (~5 hrs post dose). Repeat trough drawn ~12.5 hrs post dose = 16.6 mcg/mL. Calculated AUC ~430, which is therapeutic. However, patient's half-life is increasing and now ~17 hrs. Concerned patient is accumulating d/t weight and age. Therefore, will change to 1500 mg (~15 mg/kg DBW) IV q24h. Repeat peak and trough ordered prior to 3rd dose new regimen - 8/5 at 1800 and 8/6 at 1300.     Will continue to monitor drug levels, renal function, culture and sensitivities, and patient clinical status.       Objective:  Relevant clinical data and objective history reviewed:  185.4 cm (73\")   124 kg (273 lb 5.9 oz)   Ideal body weight: 79.9 kg (176 lb 2.4 oz)  Adjusted ideal body weight: 97.5 kg (215 lb 0.6 oz)  Body mass index is 36.07 kg/m².    Results from last 7 days   Lab Units 08/04/21  1043 08/04/21  0257 08/02/21  0959 08/02/21  0338 07/30/21  0710 07/30/21  0142   VANCOMYCIN PK mcg/mL  --   --   --  20.50  --  29.30   VANCOMYCIN TR mcg/mL 16.60 20.60* 14.70 20.40* 18.10  --      Results from last 7 " days   Lab Units 08/04/21  0257 08/03/21  0431 08/02/21  0338   CREATININE mg/dL 0.97 1.01 0.84     Estimated Creatinine Clearance: 92.1 mL/min (by C-G formula based on SCr of 0.97 mg/dL).  I/O last 3 completed shifts:  In: 3664 [P.O.:1440; I.V.:2224]  Out: 3826 [Urine:3825; Stool:1]    Results from last 7 days   Lab Units 08/04/21  0257 08/03/21  0430 08/02/21  0338   WBC 10*3/mm3 8.70 7.80 6.60     Temperature    08/03/21 1932 08/04/21 0516 08/04/21 0745   Temp: 97.6 °F (36.4 °C) 97.5 °F (36.4 °C) 97.3 °F (36.3 °C)     Baseline culture/source/susceptibility:  Microbiology Results (last 10 days)       Procedure Component Value - Date/Time    Tissue / Bone Culture - Tissue, Abdominal Wall [[id number]]  (Abnormal)  (Susceptibility) Collected: 08/01/21 1313    Lab Status: Final result Specimen: Tissue from Abdominal Wall Updated: 08/04/21 0608     Tissue Culture Moderate growth (3+) Staphylococcus aureus, MRSA     Comment: Methicillin resistant Staphylococcus aureus, Patient may be an isolation risk.        Gram Stain Many (4+) WBCs per low power field      Many (4+) Gram positive cocci in clusters    Susceptibility        Staphylococcus aureus, MRSA      YASMIN      Clindamycin Resistant      Erythromycin Resistant      Inducible Clindamycin Resistance Negative      Oxacillin Resistant      Penicillin G Resistant      Rifampin Susceptible      Tetracycline Susceptible      Trimethoprim + Sulfamethoxazole Susceptible      Vancomycin Susceptible                 Linear View                       Anaerobic Culture - Wound, Abdominal Wall [[id number]] Collected: 07/29/21 1148    Lab Status: Final result Specimen: Wound from Abdominal Wall Updated: 08/03/21 0958     Anaerobic Culture No anaerobes isolated at 5 days    Wound Culture - Wound, Abdominal Wall [[id number]]  (Abnormal) Collected: 07/29/21 1148    Lab Status: Final result Specimen: Wound from Abdominal Wall Updated: 07/31/21 0723     Wound Culture Moderate growth (3+)  Staphylococcus aureus, MRSA     Comment: Methicillin resistant Staphylococcus aureus, Patient may be an isolation risk.        Gram Stain Moderate (3+) WBCs per low power field      Few (2+) Gram positive cocci    Narrative:      Refer to previous wound culture collected on  07/27/21 for 12:34 MICs.    Anaerobic Culture - Wound, Abdominal Wall [[id number]] Collected: 07/29/21 1147    Lab Status: Final result Specimen: Wound from Abdominal Wall Updated: 08/03/21 0958     Anaerobic Culture No anaerobes isolated at 5 days    Wound Culture - Wound, Abdominal Wall [[id number]]  (Abnormal) Collected: 07/29/21 1147    Lab Status: Final result Specimen: Wound from Abdominal Wall Updated: 07/31/21 0723     Wound Culture Moderate growth (3+) Staphylococcus aureus, MRSA     Comment: Methicillin resistant Staphylococcus aureus, Patient may be an isolation risk.        Gram Stain Moderate (3+) WBCs per low power field      Moderate (3+) Gram positive cocci    Narrative:      Refer to previous wound culture collected on  07/27/21 for 12:34 MICs.    COVID PRE-OP / PRE-PROCEDURE SCREENING ORDER (NO ISOLATION) - Swab, Nasopharynx [[id number]]  (Normal) Collected: 07/28/21 0056    Lab Status: Final result Specimen: Swab from Nasopharynx Updated: 07/28/21 1500    Narrative:      The following orders were created for panel order COVID PRE-OP / PRE-PROCEDURE SCREENING ORDER (NO ISOLATION) - Swab, Nasopharynx.  Procedure                               Abnormality         Status                     ---------                               -----------         ------                     COVID-19,APTIMA PANTHER,...[[id number]]  Normal              Final result                 Please view results for these tests on the individual orders.    COVID-19,APTIMA PANTHERMICHELET IN-HOUSE, NP/OP SWAB IN UTM/VTM/SALINE TRANSPORT MEDIA,24 HR TAT - Swab, Nasopharynx [[id number]]  (Normal) Collected: 07/28/21 0056    Lab Status: Final result Specimen: Swab from  Nasopharynx Updated: 07/28/21 1500     COVID19 Not Detected    Narrative:      Fact sheet for providers: https://www.fda.gov/media/761543/download     Fact sheet for patients: https://www.fda.gov/media/632629/download    Test performed by RT PCR.    Blood Culture - Blood, Arm, Right [[id number]] Collected: 07/27/21 1236    Lab Status: Final result Specimen: Blood from Arm, Right Updated: 08/01/21 1300     Blood Culture No growth at 5 days    Blood Culture - Blood, Arm, Left [[id number]] Collected: 07/27/21 1236    Lab Status: Final result Specimen: Blood from Arm, Left Updated: 08/01/21 1300     Blood Culture No growth at 5 days    Wound Culture - Wound, Abdominal Wall [[id number]]  (Abnormal)  (Susceptibility) Collected: 07/27/21 1234    Lab Status: Final result Specimen: Wound from Abdominal Wall Updated: 07/30/21 1056     Wound Culture Scant growth (1+) Staphylococcus aureus, MRSA     Gram Stain Many (4+) WBCs per low power field      Few (2+) Gram positive cocci, intracellular and extracellular    Susceptibility        Staphylococcus aureus, MRSA      YASMIN      Clindamycin Resistant      Erythromycin Resistant      Inducible Clindamycin Resistance Negative      Oxacillin Resistant      Penicillin G Resistant      Rifampin Susceptible      Tetracycline Susceptible      Trimethoprim + Sulfamethoxazole Susceptible      Vancomycin Susceptible                 Linear View                               Anti-Infectives (From admission, onward)      Ordered     Dose/Rate Route Frequency Start Stop    07/30/21 1144  vancomycin 750 mg in sodium chloride 0.9 % 250 mL IVPB     Kizzy Navarro, PharmD reviewed the order on 08/01/21 1443.   Ordering Provider: Hill Bhatia DO    750 mg  over 45 Minutes Intravenous Every 12 Hours 07/30/21 2300 08/25/21 2259    07/29/21 1005  ceFAZolin (ANCEF) in SWFI 2 g/20ml IV PUSH syringe     Ordering Provider: Connor Galindo MD    2 g  over 5 Minutes Intravenous Once 07/29/21 1015  07/29/21 1127    07/27/21 1956  Pharmacy to dose vancomycin     Kizzy Navarro, PharmD reviewed the order on 07/31/21 5616.   Ordering Provider: Hill Bhatia DO     Does not apply Continuous PRN 07/27/21 1953 08/25/21 1589            Mary Whitaker, Alonzo  08/04/21 11:53 EDT

## 2021-08-05 LAB
ALBUMIN SERPL-MCNC: 2.9 G/DL (ref 3.5–5.2)
ALBUMIN/GLOB SERPL: 0.9 G/DL
ALP SERPL-CCNC: 209 U/L (ref 39–117)
ALT SERPL W P-5'-P-CCNC: 11 U/L (ref 1–41)
ANION GAP SERPL CALCULATED.3IONS-SCNC: 7 MMOL/L (ref 5–15)
AST SERPL-CCNC: 16 U/L (ref 1–40)
BASOPHILS # BLD AUTO: 0.1 10*3/MM3 (ref 0–0.2)
BASOPHILS NFR BLD AUTO: 1.3 % (ref 0–1.5)
BILIRUB SERPL-MCNC: 0.7 MG/DL (ref 0–1.2)
BUN SERPL-MCNC: 9 MG/DL (ref 8–23)
BUN/CREAT SERPL: 9.4 (ref 7–25)
CALCIUM SPEC-SCNC: 8.6 MG/DL (ref 8.6–10.5)
CHLORIDE SERPL-SCNC: 101 MMOL/L (ref 98–107)
CO2 SERPL-SCNC: 27 MMOL/L (ref 22–29)
CREAT SERPL-MCNC: 0.96 MG/DL (ref 0.76–1.27)
DEPRECATED RDW RBC AUTO: 52.9 FL (ref 37–54)
EOSINOPHIL # BLD AUTO: 0.3 10*3/MM3 (ref 0–0.4)
EOSINOPHIL NFR BLD AUTO: 3.9 % (ref 0.3–6.2)
ERYTHROCYTE [DISTWIDTH] IN BLOOD BY AUTOMATED COUNT: 18.5 % (ref 12.3–15.4)
GFR SERPL CREATININE-BSD FRML MDRD: 77 ML/MIN/1.73
GLOBULIN UR ELPH-MCNC: 3.2 GM/DL
GLUCOSE BLDC GLUCOMTR-MCNC: 144 MG/DL (ref 70–105)
GLUCOSE BLDC GLUCOMTR-MCNC: 167 MG/DL (ref 70–105)
GLUCOSE BLDC GLUCOMTR-MCNC: 198 MG/DL (ref 70–105)
GLUCOSE BLDC GLUCOMTR-MCNC: 199 MG/DL (ref 70–105)
GLUCOSE SERPL-MCNC: 141 MG/DL (ref 65–99)
HCT VFR BLD AUTO: 33 % (ref 37.5–51)
HGB BLD-MCNC: 11.3 G/DL (ref 13–17.7)
LYMPHOCYTES # BLD AUTO: 1.3 10*3/MM3 (ref 0.7–3.1)
LYMPHOCYTES NFR BLD AUTO: 17.5 % (ref 19.6–45.3)
MCH RBC QN AUTO: 27.6 PG (ref 26.6–33)
MCHC RBC AUTO-ENTMCNC: 34.2 G/DL (ref 31.5–35.7)
MCV RBC AUTO: 80.5 FL (ref 79–97)
MONOCYTES # BLD AUTO: 0.6 10*3/MM3 (ref 0.1–0.9)
MONOCYTES NFR BLD AUTO: 8 % (ref 5–12)
NEUTROPHILS NFR BLD AUTO: 5.3 10*3/MM3 (ref 1.7–7)
NEUTROPHILS NFR BLD AUTO: 69.3 % (ref 42.7–76)
NRBC BLD AUTO-RTO: 0.2 /100 WBC (ref 0–0.2)
PLATELET # BLD AUTO: 431 10*3/MM3 (ref 140–450)
PMV BLD AUTO: 7.4 FL (ref 6–12)
POTASSIUM SERPL-SCNC: 3.9 MMOL/L (ref 3.5–5.2)
PROT SERPL-MCNC: 6.1 G/DL (ref 6–8.5)
RBC # BLD AUTO: 4.09 10*6/MM3 (ref 4.14–5.8)
SODIUM SERPL-SCNC: 135 MMOL/L (ref 136–145)
VANCOMYCIN PEAK SERPL-MCNC: 20.9 MCG/ML (ref 20–40)
WBC # BLD AUTO: 7.6 10*3/MM3 (ref 3.4–10.8)

## 2021-08-05 PROCEDURE — 25010000002 HYDROMORPHONE PER 4 MG: Performed by: SURGERY

## 2021-08-05 PROCEDURE — 63710000001 INSULIN LISPRO (HUMAN) PER 5 UNITS: Performed by: SURGERY

## 2021-08-05 PROCEDURE — 25010000002 VANCOMYCIN 10 G RECONSTITUTED SOLUTION: Performed by: INTERNAL MEDICINE

## 2021-08-05 PROCEDURE — 80202 ASSAY OF VANCOMYCIN: CPT | Performed by: INTERNAL MEDICINE

## 2021-08-05 PROCEDURE — 99024 POSTOP FOLLOW-UP VISIT: CPT | Performed by: SURGERY

## 2021-08-05 PROCEDURE — 80053 COMPREHEN METABOLIC PANEL: CPT | Performed by: SURGERY

## 2021-08-05 PROCEDURE — 94799 UNLISTED PULMONARY SVC/PX: CPT

## 2021-08-05 PROCEDURE — 63710000001 ONDANSETRON PER 8 MG: Performed by: SURGERY

## 2021-08-05 PROCEDURE — 85025 COMPLETE CBC W/AUTO DIFF WBC: CPT | Performed by: SURGERY

## 2021-08-05 PROCEDURE — 82962 GLUCOSE BLOOD TEST: CPT

## 2021-08-05 RX ORDER — ACETAMINOPHEN 325 MG/1
325 TABLET ORAL EVERY 4 HOURS PRN
Status: DISCONTINUED | OUTPATIENT
Start: 2021-08-05 | End: 2021-08-06 | Stop reason: HOSPADM

## 2021-08-05 RX ORDER — SODIUM HYPOCHLORITE 2.5 MG/ML
SOLUTION TOPICAL 2 TIMES DAILY
Status: DISCONTINUED | OUTPATIENT
Start: 2021-08-05 | End: 2021-08-06 | Stop reason: HOSPADM

## 2021-08-05 RX ADMIN — HYOSCYAMINE SULFATE: 16 SOLUTION at 22:22

## 2021-08-05 RX ADMIN — HYDROCODONE BITARTRATE AND ACETAMINOPHEN 1 TABLET: 7.5; 325 TABLET ORAL at 11:29

## 2021-08-05 RX ADMIN — ROPINIROLE HYDROCHLORIDE 0.5 MG: 0.25 TABLET, FILM COATED ORAL at 22:15

## 2021-08-05 RX ADMIN — VANCOMYCIN HYDROCHLORIDE 1500 MG: 10 INJECTION, POWDER, LYOPHILIZED, FOR SOLUTION INTRAVENOUS at 15:31

## 2021-08-05 RX ADMIN — POTASSIUM CHLORIDE 10 MEQ: 750 TABLET, EXTENDED RELEASE ORAL at 17:38

## 2021-08-05 RX ADMIN — IPRATROPIUM BROMIDE AND ALBUTEROL SULFATE 3 ML: 2.5; .5 SOLUTION RESPIRATORY (INHALATION) at 01:00

## 2021-08-05 RX ADMIN — INSULIN LISPRO 2 UNITS: 100 INJECTION, SOLUTION INTRAVENOUS; SUBCUTANEOUS at 12:45

## 2021-08-05 RX ADMIN — ONDANSETRON HYDROCHLORIDE 4 MG: 4 TABLET, FILM COATED ORAL at 08:46

## 2021-08-05 RX ADMIN — HYOSCYAMINE SULFATE: 16 SOLUTION at 15:32

## 2021-08-05 RX ADMIN — APIXABAN 5 MG: 5 TABLET, FILM COATED ORAL at 11:29

## 2021-08-05 RX ADMIN — DOCUSATE SODIUM 100 MG: 100 CAPSULE, LIQUID FILLED ORAL at 22:15

## 2021-08-05 RX ADMIN — OXYCODONE 10 MG: 5 TABLET ORAL at 12:45

## 2021-08-05 RX ADMIN — METOPROLOL TARTRATE 25 MG: 25 TABLET, FILM COATED ORAL at 09:00

## 2021-08-05 RX ADMIN — ASPIRIN 81 MG: 81 TABLET, COATED ORAL at 09:01

## 2021-08-05 RX ADMIN — ACETAMINOPHEN 325 MG: 325 TABLET, FILM COATED ORAL at 08:45

## 2021-08-05 RX ADMIN — DOCUSATE SODIUM 100 MG: 100 CAPSULE, LIQUID FILLED ORAL at 09:01

## 2021-08-05 RX ADMIN — APIXABAN 5 MG: 5 TABLET, FILM COATED ORAL at 22:15

## 2021-08-05 RX ADMIN — METOPROLOL TARTRATE 25 MG: 25 TABLET, FILM COATED ORAL at 22:15

## 2021-08-05 RX ADMIN — HYDROCODONE BITARTRATE AND ACETAMINOPHEN 1 TABLET: 7.5; 325 TABLET ORAL at 15:31

## 2021-08-05 RX ADMIN — ACETAMINOPHEN 325 MG: 325 TABLET, FILM COATED ORAL at 17:37

## 2021-08-05 RX ADMIN — PREGABALIN 150 MG: 75 CAPSULE ORAL at 22:16

## 2021-08-05 RX ADMIN — OXYCODONE 10 MG: 5 TABLET ORAL at 17:37

## 2021-08-05 RX ADMIN — POTASSIUM CHLORIDE 10 MEQ: 750 TABLET, EXTENDED RELEASE ORAL at 09:01

## 2021-08-05 RX ADMIN — TRAZODONE HYDROCHLORIDE 100 MG: 100 TABLET ORAL at 22:21

## 2021-08-05 RX ADMIN — ACETAMINOPHEN 325 MG: 325 TABLET, FILM COATED ORAL at 12:46

## 2021-08-05 RX ADMIN — IPRATROPIUM BROMIDE AND ALBUTEROL SULFATE 3 ML: 2.5; .5 SOLUTION RESPIRATORY (INHALATION) at 14:26

## 2021-08-05 RX ADMIN — IPRATROPIUM BROMIDE AND ALBUTEROL SULFATE 3 ML: 2.5; .5 SOLUTION RESPIRATORY (INHALATION) at 09:10

## 2021-08-05 RX ADMIN — OXYCODONE 10 MG: 5 TABLET ORAL at 22:15

## 2021-08-05 RX ADMIN — FUROSEMIDE 20 MG: 20 TABLET ORAL at 08:44

## 2021-08-05 RX ADMIN — MIRTAZAPINE 30 MG: 15 TABLET, FILM COATED ORAL at 22:16

## 2021-08-05 RX ADMIN — HYDROMORPHONE HYDROCHLORIDE 0.5 MG: 2 INJECTION, SOLUTION INTRAMUSCULAR; INTRAVENOUS; SUBCUTANEOUS at 04:55

## 2021-08-05 RX ADMIN — ATORVASTATIN CALCIUM 40 MG: 40 TABLET, FILM COATED ORAL at 08:44

## 2021-08-05 RX ADMIN — CLOPIDOGREL BISULFATE 75 MG: 75 TABLET ORAL at 08:44

## 2021-08-05 RX ADMIN — BUDESONIDE 0.5 MG: 0.5 SUSPENSION RESPIRATORY (INHALATION) at 09:10

## 2021-08-05 RX ADMIN — ACETAMINOPHEN 325 MG: 325 TABLET, FILM COATED ORAL at 22:16

## 2021-08-05 RX ADMIN — POTASSIUM CHLORIDE 10 MEQ: 750 TABLET, EXTENDED RELEASE ORAL at 11:29

## 2021-08-05 RX ADMIN — PREGABALIN 150 MG: 75 CAPSULE ORAL at 09:01

## 2021-08-05 RX ADMIN — INSULIN LISPRO 2 UNITS: 100 INJECTION, SOLUTION INTRAVENOUS; SUBCUTANEOUS at 17:38

## 2021-08-05 RX ADMIN — LOSARTAN POTASSIUM 50 MG: 50 TABLET, FILM COATED ORAL at 08:44

## 2021-08-05 RX ADMIN — HYDROCODONE BITARTRATE AND ACETAMINOPHEN 1 TABLET: 7.5; 325 TABLET ORAL at 19:57

## 2021-08-05 RX ADMIN — OXYCODONE 10 MG: 5 TABLET ORAL at 08:45

## 2021-08-05 NOTE — PROGRESS NOTES
"     LOS: 7 days   Patient Care Team:  [facility]CP [facility]ing [facility] (Cardiology)  Shirley, Moise Kathleen MD as Consulting Physician (Cardiac Electrophysiology)  Izzy Alvarez MD as Consulting Physician (Nephrology)  Celine Swanson MD as Consulting Physician (Cardiology)    Subjective   \"I feel ok, Im ready to go home\"  Interval History:  none    Patient Complaints: mild abd pain; no other complaints; in good spirits but worried about pain control if cant take IV pain meds    History taken from: patient    Review of Systems   Constitutional: Positive for activity change. Negative for appetite change, fatigue and fever.   HENT: Negative for facial swelling.    Eyes: Negative for visual disturbance.   Respiratory: Negative for cough, shortness of breath, wheezing and stridor.    Cardiovascular: Negative for chest pain, palpitations and leg swelling.   Gastrointestinal: Positive for abdominal pain. Negative for constipation, diarrhea, nausea and vomiting.   Genitourinary: Negative for urgency.   Musculoskeletal: Positive for arthralgias.   Skin: Positive for wound. Negative for rash.   Neurological: Negative for weakness.   Psychiatric/Behavioral: Negative for confusion.           Objective     Vital Signs  Temp:  [97.5 °F (36.4 °C)-97.8 °F (36.6 °C)] 97.5 °F (36.4 °C)  Heart Rate:  [69-86] 71  Resp:  [13-18] 15  BP: (147-180)/(54-99) 148/83    Physical Exam:     General Appearance:    Alert, cooperative, in no acute distress, obese   Head:    Normocephalic, without obvious abnormality, atraumatic   Eyes:            Lids and lashes normal, conjunctivae and sclerae normal, no   icterus, no pallor   Ears:    Ears appear intact with no abnormalities noted   Throat:   No oral lesions, no thrush, oral mucosa moist   Neck:   No adenopathy, supple, trachea midline   Lungs:     Clear to auscultation,respirations regular, even and                  " unlabored    Heart:    Regular rhythm and normal rate, normal S1 and S2, no            murmur, no gallop, no rub, no click   Chest Wall:    No abnormalities observed   Abdomen:     Bandage clean and dry; binder in place, mod pain   Extremities:   Moves all extremities well, no edema, no cyanosis, no             Redness   Pulses:   Pulses palpable and equal bilaterally   Skin:   Bilateral CVS chanes   Lymph nodes:   No palpable adenopathy   Neurologic:   Cranial nerves 2 - 12 grossly intact, sensation intact        Results Review:    Lab Results (last 24 hours)     Procedure Component Value Units Date/Time    Comprehensive Metabolic Panel [[id number]]  (Abnormal) Collected: 08/05/21 0536    Specimen: Blood Updated: 08/05/21 0607     Glucose 141 mg/dL      BUN 9 mg/dL      Creatinine 0.96 mg/dL      Sodium 135 mmol/L      Potassium 3.9 mmol/L      Chloride 101 mmol/L      CO2 27.0 mmol/L      Calcium 8.6 mg/dL      Total Protein 6.1 g/dL      Albumin 2.90 g/dL      ALT (SGPT) 11 U/L      AST (SGOT) 16 U/L      Alkaline Phosphatase 209 U/L      Total Bilirubin 0.7 mg/dL      eGFR Non African Amer 77 mL/min/1.73      Globulin 3.2 gm/dL      A/G Ratio 0.9 g/dL      BUN/Creatinine Ratio 9.4     Anion Gap 7.0 mmol/L     Narrative:      GFR Normal >60  Chronic Kidney Disease <60  Kidney Failure <15      CBC & Differential [[id number]]  (Abnormal) Collected: 08/05/21 0536    Specimen: Blood Updated: 08/05/21 0546    Narrative:      The following orders were created for panel order CBC & Differential.  Procedure                               Abnormality         Status                     ---------                               -----------         ------                     CBC Auto Differential[[id number]]        Abnormal            Final result                 Please view results for these tests on the individual orders.    CBC Auto Differential [[id number]]  (Abnormal) Collected: 08/05/21 0536    Specimen: Blood Updated:  08/05/21 0546     WBC 7.60 10*3/mm3      RBC 4.09 10*6/mm3      Hemoglobin 11.3 g/dL      Hematocrit 33.0 %      MCV 80.5 fL      MCH 27.6 pg      MCHC 34.2 g/dL      RDW 18.5 %      RDW-SD 52.9 fl      MPV 7.4 fL      Platelets 431 10*3/mm3      Neutrophil % 69.3 %      Lymphocyte % 17.5 %      Monocyte % 8.0 %      Eosinophil % 3.9 %      Basophil % 1.3 %      Neutrophils, Absolute 5.30 10*3/mm3      Lymphocytes, Absolute 1.30 10*3/mm3      Monocytes, Absolute 0.60 10*3/mm3      Eosinophils, Absolute 0.30 10*3/mm3      Basophils, Absolute 0.10 10*3/mm3      nRBC 0.2 /100 WBC     POC Glucose Once [[id number]]  (Abnormal) Collected: 08/04/21 2224    Specimen: Blood Updated: 08/04/21 2224     Glucose 183 mg/dL      Comment: Serial Number: [id number]:  [id number]       POC Glucose Once [[id number]]  (Abnormal) Collected: 08/04/21 1607    Specimen: Blood Updated: 08/04/21 1608     Glucose 213 mg/dL      Comment: Serial Number: [id number]:  [id number]              Imaging Results (Last 24 Hours)     ** No results found for the last 24 hours. **               I reviewed the patient's new clinical results.    Medication Review:   Scheduled Meds:apixaban, 5 mg, Oral, Q12H  aspirin, 81 mg, Oral, Daily  atorvastatin, 40 mg, Oral, QAM  budesonide, 0.5 mg, Nebulization, BID - RT  clopidogrel, 75 mg, Oral, QAM  docusate sodium, 100 mg, Oral, BID  furosemide, 20 mg, Oral, QAM  insulin lispro, 0-7 Units, Subcutaneous, TID AC  ipratropium-albuterol, 3 mL, Nebulization, Q6H - RT  losartan, 50 mg, Oral, QAM  metoprolol tartrate, 25 mg, Oral, Q12H  mirtazapine, 30 mg, Oral, Nightly  potassium chloride, 10 mEq, Oral, TID With Meals  pregabalin, 150 mg, Oral, BID  rOPINIRole, 0.5 mg, Oral, Nightly  sodium hypochlorite, , Irrigation, BID  traZODone, 100 mg, Oral, Nightly  vancomycin, 1,500 mg, Intravenous, Q24H      Continuous Infusions:Pharmacy to dose vancomycin,       PRN Meds:.•  acetaminophen **OR** acetaminophen  •   acetaminophen  •  alteplase 2 mg vial + sterile water for injection  •  bisacodyl  •  calcium carbonate  •  dextrose  •  dextrose  •  docusate sodium  •  glucagon (human recombinant)  •  hydrALAZINE  •  HYDROcodone-acetaminophen  •  HYDROcodone-acetaminophen  •  insulin lispro **AND** insulin lispro  •  ipratropium-albuterol  •  magnesium sulfate **OR** magnesium sulfate in D5W 1g/100mL (PREMIX)  •  melatonin  •  metoclopramide  •  ondansetron **OR** ondansetron  •  oxyCODONE  •  Pharmacy to dose vancomycin  •  potassium chloride **OR** potassium chloride **OR** potassium chloride  •  promethazine **OR** promethazine  •  [COMPLETED] Insert peripheral IV **AND** sodium chloride     Assessment/Plan       Cellulitis of abdominal wall  CT A&P shows RLQ enterocutaneous fistula  DM-II with complication of nephropathy, neuropahty  Immunocompromised status  CKD 3  Obesity  Panlobualr emphysema  Essential hypertension  Chronic narcotic dependecny  Restless Legs  Chronic hypoxemia  Cirrhosis  Diabetic neuropathy  Peripheral artery disease     POD 5 s/p exploration/I & D of abdominal wall abscess -POD 2 of further debridement, continue antibiotics under the direction of ID.  Follow up cultures.  CT imaging shows RLQ enterocutaneous fistula-plan is to have further surgical debridement today with Dr Bhatia    Wound care per wound nurse    Will need rehab/and or [facility]-pt has chosen to go home with [facility]-Surgeon plans to release pt tomorrow/Friday    Plan for disposition:Home tomorrw with [facility]    Sasha Patel, KOURTNEY  08/05/21  11:39 EDT

## 2021-08-05 NOTE — NURSING NOTE
Pt anxious to go home. Pt is still relying on IV pain meds for relief, but has been educated on needing to use PO meds so when he returns home he will be able to control pain. Pt states he understands. Call light within reach. VSS. Will continue to monitor.

## 2021-08-05 NOTE — PROGRESS NOTES
LOS: 7 days   Patient Care Team:  Yusuf Jones MD as PCP - Joshua Evangelista MD as Consulting Physician (Cardiology)  Moise Watson MD as Consulting Physician (Cardiac Electrophysiology)  Izzy Alvarez MD as Consulting Physician (Nephrology)  Celine Swanson MD as Consulting Physician (Cardiology)    Reason for follow-up: Postop    Subjective   Patient seen and examined.  Feeling better    Objective   Wound inspected and is clean dry and intact.  Using Aquacel silver to it presently, lots of exudate on the wound    Vital Signs  Vitals:    08/05/21 0415 08/05/21 0904 08/05/21 0908 08/05/21 0912   BP: 148/83      BP Location: Left arm      Patient Position: Lying      Pulse: 71 69 70 71   Resp: 18 16 16 15   Temp: 97.5 °F (36.4 °C)      TempSrc: Oral      SpO2: 99%      Weight: 123 kg (270 lb 1 oz)      Height:             Results Review:       Lab Results (last 24 hours)     Procedure Component Value Units Date/Time    Comprehensive Metabolic Panel [[id number]]  (Abnormal) Collected: 08/05/21 0536    Specimen: Blood Updated: 08/05/21 0607     Glucose 141 mg/dL      BUN 9 mg/dL      Creatinine 0.96 mg/dL      Sodium 135 mmol/L      Potassium 3.9 mmol/L      Chloride 101 mmol/L      CO2 27.0 mmol/L      Calcium 8.6 mg/dL      Total Protein 6.1 g/dL      Albumin 2.90 g/dL      ALT (SGPT) 11 U/L      AST (SGOT) 16 U/L      Alkaline Phosphatase 209 U/L      Total Bilirubin 0.7 mg/dL      eGFR Non African Amer 77 mL/min/1.73      Globulin 3.2 gm/dL      A/G Ratio 0.9 g/dL      BUN/Creatinine Ratio 9.4     Anion Gap 7.0 mmol/L     Narrative:      GFR Normal >60  Chronic Kidney Disease <60  Kidney Failure <15      CBC & Differential [[id number]]  (Abnormal) Collected: 08/05/21 0536    Specimen: Blood Updated: 08/05/21 0546    Narrative:      The following orders were created for panel order CBC & Differential.  Procedure                               Abnormality         Status                      ---------                               -----------         ------                     CBC Auto Differential[[id number]]        Abnormal            Final result                 Please view results for these tests on the individual orders.    CBC Auto Differential [[id number]]  (Abnormal) Collected: 08/05/21 0536    Specimen: Blood Updated: 08/05/21 0546     WBC 7.60 10*3/mm3      RBC 4.09 10*6/mm3      Hemoglobin 11.3 g/dL      Hematocrit 33.0 %      MCV 80.5 fL      MCH 27.6 pg      MCHC 34.2 g/dL      RDW 18.5 %      RDW-SD 52.9 fl      MPV 7.4 fL      Platelets 431 10*3/mm3      Neutrophil % 69.3 %      Lymphocyte % 17.5 %      Monocyte % 8.0 %      Eosinophil % 3.9 %      Basophil % 1.3 %      Neutrophils, Absolute 5.30 10*3/mm3      Lymphocytes, Absolute 1.30 10*3/mm3      Monocytes, Absolute 0.60 10*3/mm3      Eosinophils, Absolute 0.30 10*3/mm3      Basophils, Absolute 0.10 10*3/mm3      nRBC 0.2 /100 WBC     POC Glucose Once [[id number]]  (Abnormal) Collected: 08/04/21 2224    Specimen: Blood Updated: 08/04/21 2224     Glucose 183 mg/dL      Comment: Serial Number: [id number]:  [id number]       POC Glucose Once [[id number]]  (Abnormal) Collected: 08/04/21 1607    Specimen: Blood Updated: 08/04/21 1608     Glucose 213 mg/dL      Comment: Serial Number: [id number]:  [id number]              Imaging Results (Last 24 Hours)     ** No results found for the last 24 hours. **          Medication Review:     Assessment/Plan         Cellulitis of abdominal wall    Impression: Postop day 4 repeat debridement of necrotic skin and subcu tissue.  Wound is improving, no necrotic tissue, probably can go home with home health Friday. I am switching to Dakins dressing changes over adaptic. Ideally this wound be done bid, but at least QD.         Cleopatra Wang MD  08/05/21  11:26 EDT

## 2021-08-05 NOTE — PLAN OF CARE
Goal Outcome Evaluation:              Outcome Summary: Pt with continued lower back and abdominal pain. Treated with PRN PO orders. Wound care completed. VSS. Plan ongoing.

## 2021-08-06 VITALS
RESPIRATION RATE: 16 BRPM | WEIGHT: 276.68 LBS | DIASTOLIC BLOOD PRESSURE: 76 MMHG | HEIGHT: 73 IN | OXYGEN SATURATION: 97 % | BODY MASS INDEX: 36.67 KG/M2 | HEART RATE: 72 BPM | SYSTOLIC BLOOD PRESSURE: 129 MMHG | TEMPERATURE: 97.4 F

## 2021-08-06 LAB
ALBUMIN SERPL-MCNC: 3.1 G/DL (ref 3.5–5.2)
ALBUMIN/GLOB SERPL: 0.9 G/DL
ALP SERPL-CCNC: 214 U/L (ref 39–117)
ALT SERPL W P-5'-P-CCNC: 10 U/L (ref 1–41)
ANION GAP SERPL CALCULATED.3IONS-SCNC: 8 MMOL/L (ref 5–15)
AST SERPL-CCNC: 16 U/L (ref 1–40)
BASOPHILS # BLD AUTO: 0.2 10*3/MM3 (ref 0–0.2)
BASOPHILS NFR BLD AUTO: 1.8 % (ref 0–1.5)
BILIRUB SERPL-MCNC: 0.6 MG/DL (ref 0–1.2)
BUN SERPL-MCNC: 13 MG/DL (ref 8–23)
BUN/CREAT SERPL: 12.3 (ref 7–25)
CALCIUM SPEC-SCNC: 8.9 MG/DL (ref 8.6–10.5)
CHLORIDE SERPL-SCNC: 100 MMOL/L (ref 98–107)
CO2 SERPL-SCNC: 28 MMOL/L (ref 22–29)
CREAT SERPL-MCNC: 1.06 MG/DL (ref 0.76–1.27)
DEPRECATED RDW RBC AUTO: 54.3 FL (ref 37–54)
EOSINOPHIL # BLD AUTO: 0.3 10*3/MM3 (ref 0–0.4)
EOSINOPHIL NFR BLD AUTO: 3.9 % (ref 0.3–6.2)
ERYTHROCYTE [DISTWIDTH] IN BLOOD BY AUTOMATED COUNT: 18.8 % (ref 12.3–15.4)
GFR SERPL CREATININE-BSD FRML MDRD: 68 ML/MIN/1.73
GLOBULIN UR ELPH-MCNC: 3.4 GM/DL
GLUCOSE BLDC GLUCOMTR-MCNC: 172 MG/DL (ref 70–105)
GLUCOSE BLDC GLUCOMTR-MCNC: 173 MG/DL (ref 70–105)
GLUCOSE SERPL-MCNC: 168 MG/DL (ref 65–99)
HCT VFR BLD AUTO: 36.6 % (ref 37.5–51)
HGB BLD-MCNC: 12.3 G/DL (ref 13–17.7)
LYMPHOCYTES # BLD AUTO: 1.3 10*3/MM3 (ref 0.7–3.1)
LYMPHOCYTES NFR BLD AUTO: 15.8 % (ref 19.6–45.3)
MCH RBC QN AUTO: 27.3 PG (ref 26.6–33)
MCHC RBC AUTO-ENTMCNC: 33.5 G/DL (ref 31.5–35.7)
MCV RBC AUTO: 81.4 FL (ref 79–97)
MONOCYTES # BLD AUTO: 0.5 10*3/MM3 (ref 0.1–0.9)
MONOCYTES NFR BLD AUTO: 5.7 % (ref 5–12)
NEUTROPHILS NFR BLD AUTO: 6.2 10*3/MM3 (ref 1.7–7)
NEUTROPHILS NFR BLD AUTO: 72.8 % (ref 42.7–76)
NRBC BLD AUTO-RTO: 0.1 /100 WBC (ref 0–0.2)
PLATELET # BLD AUTO: 469 10*3/MM3 (ref 140–450)
PMV BLD AUTO: 7.7 FL (ref 6–12)
POTASSIUM SERPL-SCNC: 4.2 MMOL/L (ref 3.5–5.2)
PROT SERPL-MCNC: 6.5 G/DL (ref 6–8.5)
RBC # BLD AUTO: 4.49 10*6/MM3 (ref 4.14–5.8)
SODIUM SERPL-SCNC: 136 MMOL/L (ref 136–145)
VANCOMYCIN TROUGH SERPL-MCNC: 8.3 MCG/ML (ref 5–20)
WBC # BLD AUTO: 8.6 10*3/MM3 (ref 3.4–10.8)

## 2021-08-06 PROCEDURE — 85025 COMPLETE CBC W/AUTO DIFF WBC: CPT | Performed by: SURGERY

## 2021-08-06 PROCEDURE — 99024 POSTOP FOLLOW-UP VISIT: CPT | Performed by: SURGERY

## 2021-08-06 PROCEDURE — 82962 GLUCOSE BLOOD TEST: CPT

## 2021-08-06 PROCEDURE — 80053 COMPREHEN METABOLIC PANEL: CPT | Performed by: SURGERY

## 2021-08-06 PROCEDURE — 25010000002 ALTEPLASE 2 MG RECONSTITUTED SOLUTION 1 EACH VIAL: Performed by: SURGERY

## 2021-08-06 PROCEDURE — 25010000002 ONDANSETRON PER 1 MG: Performed by: SURGERY

## 2021-08-06 PROCEDURE — 80202 ASSAY OF VANCOMYCIN: CPT | Performed by: SURGERY

## 2021-08-06 PROCEDURE — 63710000001 PROMETHAZINE PER 12.5 MG: Performed by: SURGERY

## 2021-08-06 PROCEDURE — 63710000001 INSULIN LISPRO (HUMAN) PER 5 UNITS: Performed by: SURGERY

## 2021-08-06 PROCEDURE — 94799 UNLISTED PULMONARY SVC/PX: CPT

## 2021-08-06 PROCEDURE — 25010000002 VANCOMYCIN 10 G RECONSTITUTED SOLUTION: Performed by: INTERNAL MEDICINE

## 2021-08-06 RX ORDER — OXYCODONE AND ACETAMINOPHEN 10; 325 MG/1; MG/1
1 TABLET ORAL 4 TIMES DAILY PRN
Qty: 28 TABLET | Refills: 0 | Status: SHIPPED | OUTPATIENT
Start: 2021-08-06 | End: 2021-08-09 | Stop reason: SDUPTHER

## 2021-08-06 RX ORDER — ASPIRIN 81 MG/1
81 TABLET ORAL DAILY
Qty: 30 TABLET | Refills: 5 | Status: ON HOLD | OUTPATIENT
Start: 2021-08-07 | End: 2022-01-01 | Stop reason: SDUPTHER

## 2021-08-06 RX ADMIN — METOPROLOL TARTRATE 25 MG: 25 TABLET, FILM COATED ORAL at 08:20

## 2021-08-06 RX ADMIN — IPRATROPIUM BROMIDE AND ALBUTEROL SULFATE 3 ML: 2.5; .5 SOLUTION RESPIRATORY (INHALATION) at 06:43

## 2021-08-06 RX ADMIN — CALCIUM CARBONATE (ANTACID) CHEW TAB 500 MG 2 TABLET: 500 CHEW TAB at 08:26

## 2021-08-06 RX ADMIN — IPRATROPIUM BROMIDE AND ALBUTEROL SULFATE 3 ML: 2.5; .5 SOLUTION RESPIRATORY (INHALATION) at 10:45

## 2021-08-06 RX ADMIN — Medication 10 ML: at 08:19

## 2021-08-06 RX ADMIN — BUDESONIDE 0.5 MG: 0.5 SUSPENSION RESPIRATORY (INHALATION) at 06:43

## 2021-08-06 RX ADMIN — DOCUSATE SODIUM 100 MG: 100 CAPSULE, LIQUID FILLED ORAL at 08:19

## 2021-08-06 RX ADMIN — ONDANSETRON 4 MG: 2 INJECTION INTRAMUSCULAR; INTRAVENOUS at 14:18

## 2021-08-06 RX ADMIN — ATORVASTATIN CALCIUM 40 MG: 40 TABLET, FILM COATED ORAL at 08:19

## 2021-08-06 RX ADMIN — ACETAMINOPHEN 325 MG: 325 TABLET, FILM COATED ORAL at 02:43

## 2021-08-06 RX ADMIN — INSULIN LISPRO 2 UNITS: 100 INJECTION, SOLUTION INTRAVENOUS; SUBCUTANEOUS at 08:26

## 2021-08-06 RX ADMIN — LOSARTAN POTASSIUM 50 MG: 50 TABLET, FILM COATED ORAL at 08:19

## 2021-08-06 RX ADMIN — APIXABAN 5 MG: 5 TABLET, FILM COATED ORAL at 11:45

## 2021-08-06 RX ADMIN — ASPIRIN 81 MG: 81 TABLET, COATED ORAL at 08:19

## 2021-08-06 RX ADMIN — HYDROCODONE BITARTRATE AND ACETAMINOPHEN 1 TABLET: 7.5; 325 TABLET ORAL at 09:52

## 2021-08-06 RX ADMIN — VANCOMYCIN HYDROCHLORIDE 1500 MG: 10 INJECTION, POWDER, LYOPHILIZED, FOR SOLUTION INTRAVENOUS at 13:05

## 2021-08-06 RX ADMIN — OXYCODONE 10 MG: 5 TABLET ORAL at 02:43

## 2021-08-06 RX ADMIN — POTASSIUM CHLORIDE 10 MEQ: 750 TABLET, EXTENDED RELEASE ORAL at 08:19

## 2021-08-06 RX ADMIN — POTASSIUM CHLORIDE 10 MEQ: 750 TABLET, EXTENDED RELEASE ORAL at 11:45

## 2021-08-06 RX ADMIN — CALCIUM CARBONATE (ANTACID) CHEW TAB 500 MG 2 TABLET: 500 CHEW TAB at 13:05

## 2021-08-06 RX ADMIN — PREGABALIN 150 MG: 75 CAPSULE ORAL at 08:19

## 2021-08-06 RX ADMIN — HYOSCYAMINE SULFATE: 16 SOLUTION at 08:13

## 2021-08-06 RX ADMIN — IPRATROPIUM BROMIDE AND ALBUTEROL SULFATE 3 ML: 2.5; .5 SOLUTION RESPIRATORY (INHALATION) at 16:05

## 2021-08-06 RX ADMIN — HYDROCODONE BITARTRATE AND ACETAMINOPHEN 1 TABLET: 7.5; 325 TABLET ORAL at 15:13

## 2021-08-06 RX ADMIN — ONDANSETRON 4 MG: 2 INJECTION INTRAMUSCULAR; INTRAVENOUS at 09:56

## 2021-08-06 RX ADMIN — PROMETHAZINE HYDROCHLORIDE 12.5 MG: 12.5 TABLET ORAL at 16:50

## 2021-08-06 RX ADMIN — CLOPIDOGREL BISULFATE 75 MG: 75 TABLET ORAL at 08:19

## 2021-08-06 RX ADMIN — FUROSEMIDE 20 MG: 20 TABLET ORAL at 08:19

## 2021-08-06 RX ADMIN — WATER: 1 INJECTION INTRAMUSCULAR; INTRAVENOUS; SUBCUTANEOUS at 14:20

## 2021-08-06 RX ADMIN — INSULIN LISPRO 2 UNITS: 100 INJECTION, SOLUTION INTRAVENOUS; SUBCUTANEOUS at 11:45

## 2021-08-06 NOTE — DISCHARGE INSTRUCTIONS
FU in office in 1-2 weeks-labs per [facility] nurse per ID directions    Dakins dressing changes over adaptic. Ideally this wound be done bid, but at least QD. Per MD.

## 2021-08-06 NOTE — PROGRESS NOTES
LOS: 8 days   Patient Care Team:  Yusuf Jones MD as PCP - General  Joshua Yip MD as Consulting Physician (Cardiology)  Shirley, Moise Kathleen MD as Consulting Physician (Cardiac Electrophysiology)  Izzy Alvarez MD as Consulting Physician (Nephrology)  Celine Swanson MD as Consulting Physician (Cardiology)    Reason for follow-up: Postop    Subjective   Patient seen and examined.  Feeling better    Objective   Wound inspected and is clean dry and intact.  Wound looks better than yesterday with much less exudate  Vital Signs  Vitals:    08/05/21 1427 08/05/21 2023 08/06/21 0500 08/06/21 0643   BP:  137/78 130/74    BP Location:  Left arm     Patient Position:  Lying     Pulse: 78 72 70 68   Resp: 16 16 18 17   Temp:  97.8 °F (36.6 °C) 97.8 °F (36.6 °C)    TempSrc:  Oral     SpO2:  96% 98% 97%   Weight:   126 kg (276 lb 10.8 oz)    Height:             Results Review:       Lab Results (last 24 hours)     Procedure Component Value Units Date/Time    POC Glucose Once [[id number]]  (Abnormal) Collected: 08/06/21 0748    Specimen: Blood Updated: 08/06/21 0749     Glucose 173 mg/dL      Comment: Serial Number: [id number]:  [id number]       Comprehensive Metabolic Panel [[id number]]  (Abnormal) Collected: 08/06/21 0254    Specimen: Blood Updated: 08/06/21 0336     Glucose 168 mg/dL      BUN 13 mg/dL      Creatinine 1.06 mg/dL      Sodium 136 mmol/L      Potassium 4.2 mmol/L      Comment: Slight hemolysis detected by analyzer. Results may be affected.        Chloride 100 mmol/L      CO2 28.0 mmol/L      Calcium 8.9 mg/dL      Total Protein 6.5 g/dL      Albumin 3.10 g/dL      ALT (SGPT) 10 U/L      AST (SGOT) 16 U/L      Comment: Slight hemolysis detected by analyzer. Results may be affected.        Alkaline Phosphatase 214 U/L      Total Bilirubin 0.6 mg/dL      eGFR Non African Amer 68 mL/min/1.73      Globulin 3.4 gm/dL      A/G Ratio 0.9 g/dL      BUN/Creatinine Ratio 12.3      Anion Gap 8.0 mmol/L     Narrative:      GFR Normal >60  Chronic Kidney Disease <60  Kidney Failure <15      CBC & Differential [[id number]]  (Abnormal) Collected: 08/06/21 0254    Specimen: Blood Updated: 08/06/21 0302    Narrative:      The following orders were created for panel order CBC & Differential.  Procedure                               Abnormality         Status                     ---------                               -----------         ------                     CBC Auto Differential[[id number]]        Abnormal            Final result                 Please view results for these tests on the individual orders.    CBC Auto Differential [[id number]]  (Abnormal) Collected: 08/06/21 0254    Specimen: Blood Updated: 08/06/21 0302     WBC 8.60 10*3/mm3      RBC 4.49 10*6/mm3      Hemoglobin 12.3 g/dL      Hematocrit 36.6 %      MCV 81.4 fL      MCH 27.3 pg      MCHC 33.5 g/dL      RDW 18.8 %      RDW-SD 54.3 fl      MPV 7.7 fL      Platelets 469 10*3/mm3      Neutrophil % 72.8 %      Lymphocyte % 15.8 %      Monocyte % 5.7 %      Eosinophil % 3.9 %      Basophil % 1.8 %      Neutrophils, Absolute 6.20 10*3/mm3      Lymphocytes, Absolute 1.30 10*3/mm3      Monocytes, Absolute 0.50 10*3/mm3      Eosinophils, Absolute 0.30 10*3/mm3      Basophils, Absolute 0.20 10*3/mm3      nRBC 0.1 /100 WBC     POC Glucose Once [[id number]]  (Abnormal) Collected: 08/05/21 2053    Specimen: Blood Updated: 08/05/21 2337     Glucose 199 mg/dL      Comment: Serial Number: [id number]:  [id number]       Vancomycin, Peak [[id number]]  (Normal) Collected: 08/05/21 2222    Specimen: Blood Updated: 08/05/21 2332     Vancomycin Peak 20.90 mcg/mL     Narrative:      Therapeutic Ranges for Vancomycin    Vancomycin Random   5.0-40.0 mcg/mL  Vancomycin Trough   5.0-20.0 mcg/mL  Vancomycin Peak     20.0-40.0 mcg/mL    POC Glucose Once [[id number]]  (Abnormal) Collected: 08/05/21 1547    Specimen: Blood Updated: 08/05/21 1548     Glucose  198 mg/dL      Comment: Serial Number: [id number]:  [id number]              Imaging Results (Last 24 Hours)     ** No results found for the last 24 hours. **          Medication Review:     Assessment/Plan         Cellulitis of abdominal wall    Impression: Postop day 5 repeat debridement of necrotic skin and subcu tissue.  Wound is improving, no necrotic tissue, can go home with home health today. I am switching to Dakins dressing changes over adaptic. Ideally this wound be done bid, but at least QD.     I would like to see him in my office next week.        Cleopatra Wang MD  08/06/21  08:54 EDT

## 2021-08-06 NOTE — TELEPHONE ENCOUNTER
HUB STAFF ATTEMPTED NON-CLINICAL WARM TRANSFER - NO ANSWER     Caller: LONI SIDHU    Relationship to patient: WIFE     Best call back number: [phone]     Chief complaint: TO SCHEDULE TELEPHONE VISIT PER DR. WARREN AFTER BEING DISCHARGED FROM [facility] TODAY 08/06/21     Type of visit: FOLLOW UP / LUMBAR / MED CHECK, REFILL     Requested date: ANY DAY ANY TIME FOR THE TELEPHONE VISIT - PATIENT SHOULD BE HOME BY LATER THIS MORNING OR EARLY AFTERNOON 08/06/21     Additional notes: PATIENT HAD A BLOOD INFECTION & HAD TO HAVE AN EXCISION IN HIS STOMACH AREA. WIFE REPORTS PATIENT ONLY HAS A COUPLE NORCO 5-325 MG TABLETS LEFT TO LAST TILL MON 08/09/21     WIFE LONI'S Best call back number: [phone]   OR PATIENT [phone]     THANKS

## 2021-08-06 NOTE — CASE MANAGEMENT/SOCIAL WORK
Case Management Discharge Note      Final Note: DC home with [facility]. CM updated [facility] liaison of scheduled dc.      Selected Continued Care - Admitted Since 7/27/2021         Dialysis/Infusion Coordination complete.    Service Provider Selected Services Address Phone Fax Patient Preferred    [facility] [address] [place], [place] [address] [phone] --          Home Medical Care Coordination complete.    Service Provider Selected Services Address Phone Fax Patient Preferred    [facility]  [facility] [address] [place], [place] [address] [phone] --           Final Discharge Disposition Code: 06 - home with home health care

## 2021-08-06 NOTE — CONSULTS
Nutrition Services    Patient Name:  Ro Nathan  YOB: 1945  MRN: [id number]  Admit Date:  7/27/2021    Diet education for wound healing provided today - print resource from Nutrition Care Manual reviewed/provided, and RD contact information given. Encouraged to call with any further nutrition needs any time.     Electronically signed by:  Lupe Mendenhall RD  08/06/21 16:57 EDT

## 2021-08-06 NOTE — DISCHARGE SUMMARY
Date of Discharge:  8/6/2021    Discharge Diagnosis: Abd wall cellulitis and abscess with fistula, bacteremia-MRSA, DM2, chronic pain, large ventral hernia.    Presenting Problem/History of Present Illness  Active Hospital Problems    Diagnosis  POA   • Cellulitis of abdominal wall [L03.311]  Yes      Resolved Hospital Problems   No resolved problems to display.      Hospital Course  Patient is a 75 y.o. male admitted with abd wall cellulitis and found to have large abscess and fistula with small bowel exposure requiring surgical debridement. The wound measures 33T82bc and was unable to be closed due to to his size and extremely large ventral hernia. Cultures grew MRSA, he received iV antibitoics and will cont 2 weeks of home Vancomycin to complete 4 full weeks. He will have BID dressing changes to his large abd wound per [facility] and spouse. Pain was controlled with IV pain meds and switched to his normal home Percocet prior to DC    Procedures Performed    Procedure(s):  EXCISIONAL ABDOMINAL WOUND  DEBRIDEMENT  -------------------    Procedure(s):  DEBRIDEMENT OF ABDOMINAL WALL  -------------------       Consults:   Consults     Date and Time Order Name Status Description    7/28/2021  9:37 AM Inpatient Infectious Diseases Consult Completed     7/28/2021  9:37 AM Inpatient General Surgery Consult Completed     7/27/2021 12:29 PM Family Medicine Consult Completed     7/19/2021  2:27 PM Inpatient Cardiology Consult Completed     7/17/2021  1:24 PM Inpatient Infectious Diseases Consult Completed           Pertinent Test Results:     Condition on Discharge:  Fair    Vital Signs  Temp:  [97.8 °F (36.6 °C)-97.9 °F (36.6 °C)] 97.8 °F (36.6 °C)  Heart Rate:  [68-79] 68  Resp:  [15-18] 17  BP: (130-137)/(74-78) 130/74    Physical Exam:     General Appearance:    Alert, cooperative, in no acute distress   Head:    Normocephalic, without obvious abnormality, atraumatic   Eyes:            Lids and lashes normal, conjunctivae and  sclerae normal, no   icterus,    Ears:    Ears appear intact with no abnormalities noted   Throat:   No oral lesions, no thrush, oral mucosa moist   Neck:   No adenopathy, supple, trachea midline, no thyromegaly   Back:     No kyphosis present, no scoliosis present, no skin lesions,      erythema or scars, no tenderness to percussion or                   palpation,   range of motion normal   Lungs:     Clear to auscultation,respirations regular, even and                  unlabored    Heart:    Regular rhythm and normal rate, normal S1 and S2, no            murmur, no gallop, no rub, no click   Chest Wall:    No abnormalities observed   Abdomen:     Wound dressed and binder on, all clean and dry   Rectal:     Deferred   Extremities:   Moves all extremities well, no edema, no cyanosis, no             redness   Pulses:   Pulses palpable and equal bilaterally   Skin:   No bleeding, bruising or rash   Lymph nodes:   No palpable adenopathy   Neurologic:   Cranial nerves 2 - 12 grossly intact, sensation intact       Discharge Disposition  Home-Health Care Svc    Discharge Medications     Discharge Medications      ASK your doctor about these medications      Instructions Start Date   apixaban 5 MG tablet tablet  Commonly known as: ELIQUIS   5 mg, Oral, 2 Times Daily      aspirin 81 MG EC tablet   81 mg, Oral, Daily      atorvastatin 40 MG tablet  Commonly known as: LIPITOR   40 mg, Oral, Every Morning      budesonide 0.5 MG/2ML nebulizer solution  Commonly known as: PULMICORT   0.5 mg, Nebulization, 2 Times Daily PRN      clopidogrel 75 MG tablet  Commonly known as: PLAVIX   75 mg, Oral, Every Morning      furosemide 20 MG tablet  Commonly known as: LASIX   20 mg, Oral, Every Morning      ipratropium-albuterol 0.5-2.5 mg/3 ml nebulizer  Commonly known as: DUO-NEB   3 mL, 4 Times Daily PRN      losartan 50 MG tablet  Commonly known as: COZAAR   50 mg, Oral, Every Morning      metoprolol tartrate 25 MG tablet  Commonly known  as: LOPRESSOR   25 mg, Oral, 2 Times Daily      miconazole 2 % cream  Commonly known as: MICOTIN   1 application, Topical, Every 12 Hours Scheduled      mirtazapine 30 MG tablet  Commonly known as: REMERON   30 mg, Oral, Nightly      nitroglycerin 0.4 MG SL tablet  Commonly known as: NITROSTAT   0.4 mg, Sublingual, Every 5 Minutes PRN      oxyCODONE-acetaminophen  MG per tablet  Commonly known as: PERCOCET  Ask about: Should I take this medication?   1 tablet, Oral, 4 Times Daily PRN      Perforomist 20 MCG/2ML nebulizer solution  Generic drug: formoterol   20 mcg, 2 Times Daily PRN      potassium chloride 10 MEQ CR tablet   10 mEq, Oral, 3 Times Daily With Meals      pregabalin 150 MG capsule  Commonly known as: LYRICA   TAKE ONE CAPSULE BY MOUTH TWICE A DAY      rOPINIRole 0.5 MG tablet  Commonly known as: REQUIP   0.5 mg, Oral, Nightly      traZODone 100 MG tablet  Commonly known as: DESYREL   100 mg, Oral, Nightly      vancomycin  Ask about: Should I take this medication?   1,250 mg, Intravenous, Every 12 Hours      Ventolin  (90 Base) MCG/ACT inhaler  Generic drug: albuterol sulfate HFA   2 puffs, Inhalation, Every 6 Hours PRN             Discharge Diet:     Activity at Discharge:     Follow-up Appointments  No future appointments.  Additional Instructions for the Follow-ups that You Need to Schedule     Ambulatory Referral to Home Health   As directed      Face to Face Visit Date: 7/28/2021    Follow-up provider for Plan of Care?: I will be treating the patient on an ongoing basis.  Please send me the Plan of Care for signature.    Follow-up provider: DONALDO CALLES [[id number]]    Reason/Clinical Findings: post hospital evaluation    Describe mobility limitations that make leaving home difficult: weakness    Nursing/Therapeutic Services Requested: Other (CAITLIN order)    Frequency: 1 Week 1               Test Results Pending at Discharge       KOURTNEY Bedolla  08/06/21  09:15 EDT

## 2021-08-06 NOTE — PLAN OF CARE
Goal Outcome Evaluation:  Plan of Care Reviewed With: patient           Outcome Summary: Pt continues with back pain along with abd pain. Dressing change done early this morning. Pt tolerated well. VSS. Anticipate going home today.

## 2021-08-06 NOTE — TELEPHONE ENCOUNTER
Will have meagan  put down for Monday 8-9-21 at 10am when she gets in on Monday morning; Face sheet printed and inspect indexed in chart

## 2021-08-06 NOTE — PROGRESS NOTES
"Pharmacy Antimicrobial Dosing Service    Subjective:  Ro Nathan is a 75 y.o.male admitted with abdominal wall cellulitis. Pharmacy has been consulted to dose Vancomycin for possible sepsis and continuation of outpatient regimen. Patient recently admitted with MRSA bacteremia and discharged 3 days prior to current admission on vancomycin. Vancomycin to continue until 8/28 per ID recommendation last admission. Per Surgery, likely source of bacteremia is incarcerated ventral hernia with soft tissue necrosis of abdominal wall.     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 Day #11 Vancomycin (this admission): Goal -600 mcg*h/mL. Receiving 1500 mg IV (~15 mg/kg DBW) q24h. Peak and trough collected early to assess patient prior to discharge. Peak 8/5 @2222 = 20.9 mcg/mL, trough 8/6@1145= 8.3 mcg/mL. [facility] predicts therapeutic AUC on current regimen ~ 463 mcg*hr/mL, and true trough = 13.3 mcg/mL. Will continue current regimen and obtain repeat trough to monitor for accumulation.    Will continue to monitor drug levels, renal function, culture and sensitivities, and patient clinical status.       Objective:  Relevant clinical data and objective history reviewed:  185.4 cm (73\")   126 kg (276 lb 10.8 oz)   Ideal body weight: 79.9 kg (176 lb 2.4 oz)  Adjusted ideal body weight: 98.1 kg (216 lb 5.8 oz)  Body mass index is 36.5 kg/m².    Results from last 7 days   Lab Units 08/06/21  1145 08/05/21  2222 08/04/21  1043 08/04/21  0257 08/02/21  0959 08/02/21  0338   VANCOMYCIN PK mcg/mL  --  20.90  --   --   --  20.50   VANCOMYCIN TR mcg/mL 8.30  --  16.60 20.60* 14.70 20.40*     Results from last 7 days   Lab Units 08/06/21  0254 08/05/21  0536 08/04/21  0257   CREATININE mg/dL 1.06 0.96 0.97     Estimated Creatinine Clearance: 85 mL/min (by C-G formula " based on SCr of 1.06 mg/dL).  I/O last 3 completed shifts:  In: 300 [P.O.:300]  Out: 2700 [Urine:2700]    Results from last 7 days   Lab Units 08/06/21  0254 08/05/21  0536 08/04/21  0257   WBC 10*3/mm3 8.60 7.60 8.70     Temperature    08/05/21 2023 08/06/21 0500 08/06/21 1123   Temp: 97.8 °F (36.6 °C) 97.8 °F (36.6 °C) 97.4 °F (36.3 °C)     Baseline culture/source/susceptibility:  Microbiology Results (last 10 days)       Procedure Component Value - Date/Time    Tissue / Bone Culture - Tissue, Abdominal Wall [[id number]]  (Abnormal)  (Susceptibility) Collected: 08/01/21 1313    Lab Status: Final result Specimen: Tissue from Abdominal Wall Updated: 08/04/21 0608     Tissue Culture Moderate growth (3+) Staphylococcus aureus, MRSA     Comment: Methicillin resistant Staphylococcus aureus, Patient may be an isolation risk.        Gram Stain Many (4+) WBCs per low power field      Many (4+) Gram positive cocci in clusters    Susceptibility        Staphylococcus aureus, MRSA      YASMIN      Clindamycin Resistant      Erythromycin Resistant      Inducible Clindamycin Resistance Negative      Oxacillin Resistant      Penicillin G Resistant      Rifampin Susceptible      Tetracycline Susceptible      Trimethoprim + Sulfamethoxazole Susceptible      Vancomycin Susceptible                 Linear View                       Anaerobic Culture - Wound, Abdominal Wall [[id number]] Collected: 07/29/21 1148    Lab Status: Final result Specimen: Wound from Abdominal Wall Updated: 08/03/21 0958     Anaerobic Culture No anaerobes isolated at 5 days    Wound Culture - Wound, Abdominal Wall [[id number]]  (Abnormal) Collected: 07/29/21 1148    Lab Status: Final result Specimen: Wound from Abdominal Wall Updated: 07/31/21 0723     Wound Culture Moderate growth (3+) Staphylococcus aureus, MRSA     Comment: Methicillin resistant Staphylococcus aureus, Patient may be an isolation risk.        Gram Stain Moderate (3+) WBCs per low power field       Few (2+) Gram positive cocci    Narrative:      Refer to previous wound culture collected on  07/27/21 for 12:34 MICs.    Anaerobic Culture - Wound, Abdominal Wall [[id number]] Collected: 07/29/21 1147    Lab Status: Final result Specimen: Wound from Abdominal Wall Updated: 08/03/21 0958     Anaerobic Culture No anaerobes isolated at 5 days    Wound Culture - Wound, Abdominal Wall [[id number]]  (Abnormal) Collected: 07/29/21 1147    Lab Status: Final result Specimen: Wound from Abdominal Wall Updated: 07/31/21 0723     Wound Culture Moderate growth (3+) Staphylococcus aureus, MRSA     Comment: Methicillin resistant Staphylococcus aureus, Patient may be an isolation risk.        Gram Stain Moderate (3+) WBCs per low power field      Moderate (3+) Gram positive cocci    Narrative:      Refer to previous wound culture collected on  07/27/21 for 12:34 MICs.    COVID PRE-OP / PRE-PROCEDURE SCREENING ORDER (NO ISOLATION) - Swab, Nasopharynx [[id number]]  (Normal) Collected: 07/28/21 0056    Lab Status: Final result Specimen: Swab from Nasopharynx Updated: 07/28/21 1500    Narrative:      The following orders were created for panel order COVID PRE-OP / PRE-PROCEDURE SCREENING ORDER (NO ISOLATION) - Swab, Nasopharynx.  Procedure                               Abnormality         Status                     ---------                               -----------         ------                     COVID-19,APTIMA PANTHER,...[[id number]]  Normal              Final result                 Please view results for these tests on the individual orders.    COVID-19,[facility]TIMA [facility] IN-HOUSE, NP/OP SWAB IN UTM/VTM/SALINE TRANSPORT MEDIA,24 HR TAT - Swab, Nasopharynx [[id number]]  (Normal) Collected: 07/28/21 0056    Lab Status: Final result Specimen: Swab from Nasopharynx Updated: 07/28/21 1500     COVID19 Not Detected    Narrative:      Fact sheet for providers: https://www.fda.gov/media/859149/download     Fact sheet for patients:  https://www.fda.gov/media/353154/download    Test performed by RT PCR.            Anti-Infectives (From admission, onward)      Ordered     Dose/Rate Route Frequency Start Stop    08/06/21 0950  vancomycin 1500 mg/500 mL 0.9% NS IVPB ([facility])     Ordering Provider: Sasha Patel APRN    1,500 mg Intravenous Every 24 Hours 08/06/21 0000 08/26/21 2359    08/04/21 1157  vancomycin 1500 mg/500 mL 0.9% NS IVPB ([facility])     Ordering Provider: Francis Morton MD    1,500 mg  over 45 Minutes Intravenous Every 24 Hours 08/05/21 1400 08/26/21 1359    07/29/21 1005  ceFAZolin (ANCEF) in SWFI 2 g/20ml IV PUSH syringe     Ordering Provider: Connor Galindo MD    2 g  over 5 Minutes Intravenous Once 07/29/21 1015 07/29/21 1127    07/27/21 1956  Pharmacy to dose vancomycin     Kizzy Navarro, PharmD reviewed the order on 07/31/21 1556.   Ordering Provider: Hill Bhatia, DO     Does not apply Continuous PRN 07/27/21 1953 08/25/21 2359            Rasheeda Jara EB  08/06/21 12:44 EDT

## 2021-08-07 ENCOUNTER — READMISSION MANAGEMENT (OUTPATIENT)
Dept: CALL CENTER | Facility: HOSPITAL | Age: 76
End: 2021-08-07

## 2021-08-07 NOTE — OUTREACH NOTE
Prep Survey      Responses   [facility] facility patient discharged from?  [facility]   Is LACE score < 7 ?  No   Emergency Room discharge w/ pulse ox?  No   Eligibility  Readm Mgmt   Discharge diagnosis  EXCISIONAL ABDOMINAL WOUND  DEBRIDEMENT,  Abdominal wall cellulitis   Does the patient have one of the following disease processes/diagnoses(primary or secondary)?  General Surgery   Does the patient have Home health ordered?  Yes   What is the Home health agency?   [facility]   Is there a DME ordered?  No   Prep survey completed?  Yes          Lidia Medina RN

## 2021-08-09 ENCOUNTER — OFFICE VISIT (OUTPATIENT)
Dept: PAIN MEDICINE | Facility: CLINIC | Age: 76
End: 2021-08-09

## 2021-08-09 VITALS — WEIGHT: 276 LBS | BODY MASS INDEX: 36.58 KG/M2 | HEIGHT: 73 IN

## 2021-08-09 DIAGNOSIS — M54.16 LUMBAR RADICULOPATHY: ICD-10-CM

## 2021-08-09 DIAGNOSIS — M47.817 SPONDYLOSIS OF LUMBOSACRAL REGION WITHOUT MYELOPATHY OR RADICULOPATHY: ICD-10-CM

## 2021-08-09 DIAGNOSIS — M54.50 CHRONIC MIDLINE LOW BACK PAIN WITHOUT SCIATICA: Primary | ICD-10-CM

## 2021-08-09 DIAGNOSIS — G89.29 CHRONIC MIDLINE LOW BACK PAIN WITHOUT SCIATICA: Primary | ICD-10-CM

## 2021-08-09 PROCEDURE — 99441 PR PHYS/QHP TELEPHONE EVALUATION 5-10 MIN: CPT | Performed by: PHYSICAL MEDICINE & REHABILITATION

## 2021-08-09 RX ORDER — OXYCODONE AND ACETAMINOPHEN 10; 325 MG/1; MG/1
1 TABLET ORAL EVERY 6 HOURS PRN
Qty: 120 TABLET | Refills: 0 | Status: SHIPPED | OUTPATIENT
Start: 2021-08-09 | End: 2021-11-02 | Stop reason: SDUPTHER

## 2021-08-09 RX ORDER — OXYCODONE AND ACETAMINOPHEN 10; 325 MG/1; MG/1
1 TABLET ORAL EVERY 6 HOURS PRN
Qty: 120 TABLET | Refills: 0 | Status: ON HOLD | OUTPATIENT
Start: 2021-08-09 | End: 2021-10-14 | Stop reason: SDUPTHER

## 2021-08-09 RX ORDER — OXYCODONE AND ACETAMINOPHEN 10; 325 MG/1; MG/1
1 TABLET ORAL EVERY 6 HOURS PRN
Qty: 120 TABLET | Refills: 0 | Status: ON HOLD | OUTPATIENT
Start: 2021-08-09 | End: 2021-10-14
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
## 2021-08-10 ENCOUNTER — TELEPHONE (OUTPATIENT)
Dept: BARIATRICS/WEIGHT MGMT | Facility: CLINIC | Age: 76
End: 2021-08-10

## 2021-08-11 ENCOUNTER — READMISSION MANAGEMENT (OUTPATIENT)
Dept: CALL CENTER | Facility: HOSPITAL | Age: 76
End: 2021-08-11

## 2021-08-11 NOTE — OUTREACH NOTE
General Surgery Week 1 Survey      Responses   [facility] patient discharged from?  [facility]   Does the patient have one of the following disease processes/diagnoses(primary or secondary)?  General Surgery   Week 1 attempt successful?  No   Unsuccessful attempts  Attempt 1          Loli Torres RN

## 2021-08-13 ENCOUNTER — OFFICE VISIT (OUTPATIENT)
Dept: BARIATRICS/WEIGHT MGMT | Facility: CLINIC | Age: 76
End: 2021-08-13

## 2021-08-13 VITALS
HEART RATE: 77 BPM | OXYGEN SATURATION: 99 % | DIASTOLIC BLOOD PRESSURE: 76 MMHG | TEMPERATURE: 98.6 F | BODY MASS INDEX: 35.84 KG/M2 | SYSTOLIC BLOOD PRESSURE: 118 MMHG | HEIGHT: 73 IN | RESPIRATION RATE: 18 BRPM | WEIGHT: 270.4 LBS

## 2021-08-13 DIAGNOSIS — T14.8XXA OPEN WOUND: Primary | ICD-10-CM

## 2021-08-13 PROCEDURE — 99213 OFFICE O/P EST LOW 20 MIN: CPT | Performed by: SURGERY

## 2021-08-13 RX ORDER — VANCOMYCIN HYDROCHLORIDE 5 G/100ML
INJECTION, POWDER, LYOPHILIZED, FOR SOLUTION INTRAVENOUS
COMMUNITY
Start: 2021-08-10 | End: 2021-08-13

## 2021-08-13 NOTE — PROGRESS NOTES
Enter Query Response Below      Query Response:       Cellulitis unrelated to diabetes       If applicable, please update the problem list.            Patient: Ro Nathan        : 1945  Account: [id number]           Admit Date: 2021        Options to Respond to Query:    1. Access the Encounter     a. From the To-Do Side bar, click Respond With Note.     b. Click New Note     c. Answer query within the yellow box.                d. Update the Problem List if applicable.     ,     RISK FACTORS-75 year old male with history of diabetes, inoperable hernia presented with abdominal pain and was found to have abdominal wall cellulitis.    CLINICAL INDICATORS-Surgery consult noted “inoperable hernia with intermittent obstructions, contained perforation now causing a fistula.”  Excisional debridements were performed with culture growing MRSA.    Labs included HGB A1C 9.0; blood sugar on admission was 127.    Diabetic educator noted “Patient was last seen 2021 for follow-up on insulin administration and checking blood sugars. Patient stated that he was not discharged home on insulin or any meds for diabetes. He was checking his blood sugar while home with results 150-190. Patient  stated he thinks is diabetes is under control right now.”    Home med list and discharge summary did not contain evidence of insulin, oral hypoglycemic.     TREATMENT-Diabetic educator consult, intravenous antibiotics on discharge, surgical and infectious disease consults, Accuchecks TID with sliding scale insulin.    After study, please specify any suspected correlation between the cellulitis and diabetes in this case:    *Cellulitis secondary to diabetes  *Cellulitis unrelated to diabetes  *Other _________  *Clinically undetermined    By submitting this query, we are merely seeking further clarification of documentation to accurately reflect all conditions that you are monitoring, evaluating, treating or that  extend the hospitalization or utilize additional resources of care. Please utilize your independent clinical judgment when addressing the question(s) above.     This query and your response, once completed, will be entered into the legal medical record.    Sincerely,  Cali Potter RN, BSN  Clinical Documentation Integrity Program   [email or web address]

## 2021-08-15 NOTE — PROGRESS NOTES
Subjective:     Encounter Date:02/24/2020      Patient ID: Ro Nathan is a 74 y.o. male.    Chief Complaint:  Followup pacemaker and Afib    HPI:  Patient presents for followup of his permanent Afib and pacemaker.  Mr Nathan is a 73 yo patient of Dr. Yip with a past medical history significant for HTN, HLD, CAD, COPD and permanent atrial fibrillation for years.  He also had a pacemaker implanted.  He has been on rate control and was previously fully anticoagulated but had severe GI bleeding and it was discontinued.  Since, then he has been on ASA and Effient.  He has not had any neurologic events or recurrent GI bleeding.    Today, he is without any complaints of palpitations, fatigue, dizziness or chest pain.      The following portions of the patient's history were reviewed and updated as appropriate: allergies, current medications, past family history, past medical history, past social history, past surgical history and problem list.    Problem List:  Patient Active Problem List   Diagnosis   • Skin ulcer (CMS/HCC)   • Chronic low back pain   • DDD (degenerative disc disease), lumbar   • Spondylosis of lumbosacral region   • Atrial fibrillation (CMS/HCC)   • Chronic obstructive pulmonary disease (CMS/HCC)   • Coronary artery disease   • Gastroesophageal reflux disease   • Hyperlipidemia   • Hypertension   • Lumbar radiculopathy   • Other hammer toe(s) (acquired), right foot   • Presence of cardiac pacemaker   • Status post coronary artery stent placement   • Lumbar facet joint pain   • COPD exacerbation (CMS/HCC)   • COPD (chronic obstructive pulmonary disease) (CMS/HCC)   • Unstable angina (CMS/HCC)   • Stented coronary artery       Past Medical History:  Past Medical History:   Diagnosis Date   • Bruises easily    • CHF (congestive heart failure) (CMS/HCC)    • Congenital heart defect    • Difficulty attaining erection    • Hypertension    • Low back pain    • Poor circulation    • Prostate cancer  (CMS/[facility])    • Shortness of breath    • Stented coronary artery 12/05/2019    MICHAEL to LAD       Past Surgical History:  Past Surgical History:   Procedure Laterality Date   • APPENDECTOMY     • CARDIAC CATHETERIZATION N/A 12/5/2019    Procedure: Left Heart Cath;  Surgeon: Mirza Munson MD;  Location: [facility] CATH INVASIVE LOCATION;  Service: Cardiology   • CARDIAC CATHETERIZATION N/A 12/5/2019    Procedure: Stent MICHAEL coronary;  Surgeon: Mirza Munson MD;  Location: [facility] CATH INVASIVE LOCATION;  Service: Cardiology   • CHOLECYSTECTOMY     • ELBOW PROCEDURE      left   • HERNIA REPAIR     • INTERVENTIONAL RADIOLOGY PROCEDURE N/A 12/5/2019    Procedure: Intravascular Ultrasound;  Surgeon: Mirza Munson MD;  Location: [facility] CATH INVASIVE LOCATION;  Service: Cardiology   • WA RT/LT HEART CATHETERS N/A 12/5/2019    Procedure: Percutaneous Coronary Intervention;  Surgeon: Mirza Munson MD;  Location: [facility] CATH INVASIVE LOCATION;  Service: Cardiology       Social History:  Social History     Socioeconomic History   • Marital status:      Spouse name: Not on file   • Number of children: Not on file   • Years of education: Not on file   • Highest education level: Not on file   Tobacco Use   • Smoking status: Former Smoker   • Smokeless tobacco: Never Used   Substance and Sexual Activity   • Alcohol use: Yes     Frequency: Never     Comment: socially    • Drug use: No   • Sexual activity: Defer       Allergies:  Allergies   Allergen Reactions   • Haloperidol Hives   • Morphine Itching   • Pantoprazole Other (See Comments)     Feels sick after receiving       Immunizations:    There is no immunization history on file for this patient.    ROS:  Review of Systems   Constitution: Negative for malaise/fatigue.   Cardiovascular: Negative for chest pain, dyspnea on exertion, irregular heartbeat, leg swelling, near-syncope, palpitations and syncope.   Respiratory: Negative  "for shortness of breath.    All other systems reviewed and are negative.         Objective:         /72   Pulse 74   Ht 185.4 cm (73\")   Wt 122 kg (269 lb)   BMI 35.49 kg/m²     Physical Exam   Constitutional: He is oriented to person, place, and time. He appears well-developed and well-nourished. No distress.   HENT:   Head: Normocephalic and atraumatic.   Eyes: Pupils are equal, round, and reactive to light. Conjunctivae and EOM are normal. No scleral icterus.   Neck: Normal range of motion. No thyromegaly present.   Cardiovascular: Normal rate, regular rhythm and normal heart sounds.   Pulmonary/Chest: Effort normal and breath sounds normal.   Abdominal: Soft. Bowel sounds are normal.   Musculoskeletal: Normal range of motion.   Neurological: He is alert and oriented to person, place, and time.   Skin: Skin is warm and dry.   Psychiatric: He has a normal mood and affect.       In-Office Procedure(s):  Procedures Pacemaker Eval interpereted by me  MDT ADDR01    Battery 5 yrs to paola    R wave paced  Threshld 0.6V  Impedance 405 ohms    Events - none    ASCVD RIsk Score::  The 10-year ASCVD risk score (Alsey ERNIE Jr., et al., 2013) is: 21.3%    Values used to calculate the score:      Age: 74 years      Sex: Male      Is Non- : No      Diabetic: No      Tobacco smoker: No      Systolic Blood Pressure: 120 mmHg      Is BP treated: Yes      HDL Cholesterol: 48 mg/dL      Total Cholesterol: 160 mg/dL    Recent Radiology:  Imaging Results (Most Recent)     None          Lab Review:   Hospital Outpatient Visit on 01/27/2020   Component Date Value   • Protime 01/27/2020 10.7    • INR 01/27/2020 1.02    • WBC 01/27/2020 8.70    • RBC 01/27/2020 4.52    • Hemoglobin 01/27/2020 14.9    • Hematocrit 01/27/2020 41.7    • MCV 01/27/2020 92.2    • MCH 01/27/2020 33.0    • MCHC 01/27/2020 35.8*   • RDW 01/27/2020 15.3    • RDW-SD 01/27/2020 49.9    • MPV 01/27/2020 7.9    • Platelets 01/27/2020 298  "   • Neutrophil % 01/27/2020 71.6    • Lymphocyte % 01/27/2020 14.8*   • Monocyte % 01/27/2020 8.4    • Eosinophil % 01/27/2020 5.0    • Basophil % 01/27/2020 0.2    • Neutrophils, Absolute 01/27/2020 6.20    • Lymphocytes, Absolute 01/27/2020 1.30    • Monocytes, Absolute 01/27/2020 0.70    • Eosinophils, Absolute 01/27/2020 0.40    • Basophils, Absolute 01/27/2020 0.00    • nRBC 01/27/2020 0.1    Admission on 12/08/2019, Discharged on 12/08/2019   Component Date Value   • D-Dimer, Quantitative 12/08/2019 0.35    • Troponin T 12/08/2019 <0.010    • WBC 12/08/2019 13.80*   • RBC 12/08/2019 4.76    • Hemoglobin 12/08/2019 15.3    • Hematocrit 12/08/2019 43.9    • MCV 12/08/2019 92.2    • MCH 12/08/2019 32.1    • MCHC 12/08/2019 34.8    • RDW 12/08/2019 15.2    • RDW-SD 12/08/2019 49.4    • MPV 12/08/2019 7.9    • Platelets 12/08/2019 292    • Scan Slide 12/08/2019     • Neutrophil % 12/08/2019 82.0*   • Lymphocyte % 12/08/2019 10.0*   • Monocyte % 12/08/2019 3.0*   • Bands %  12/08/2019 2.0    • Atypical Lymphocyte % 12/08/2019 3.0    • Neutrophils Absolute 12/08/2019 11.59*   • Lymphocytes Absolute 12/08/2019 1.38    • Monocytes Absolute 12/08/2019 0.41    • Dacrocytes 12/08/2019 Slight/1+    • Elliptocytes 12/08/2019 Mod/2+    • Polychromasia 12/08/2019 Slight/1+    • WBC Morphology 12/08/2019 Normal    • Large Platelets 12/08/2019 Slight/1+    No results displayed because visit has over 200 results.                   Assessment:          Diagnosis Plan   1. Permanent atrial fibrillation     2. Presence of cardiac pacemaker            Plan:     1. Permanent AF - rate controlled, no AC due to GI bleeding, continue ASA and Effient  2. Pacemaker followup - normal device function    RTC 6 months         Level of Care:                 Moise Watson MD  02/24/20  .   fall precautions

## 2021-08-15 NOTE — PROGRESS NOTES
He was recently hospitalized for MRSA of the blood and he had an open abdominal wound with exposed bowel.  This was debrided and he has ongoing dressing changes that his wife is doing with Adaptic and Dakin's twice a day.  Next  The     wound is healing well.      Continue Adaptic and Dakin's for now.  I discussed the possibility of a skin graft or using graft jacket but since it is healing well we will continue but were doing for now.  Will refer to wound clinic as well for their opinion.

## 2021-08-18 ENCOUNTER — READMISSION MANAGEMENT (OUTPATIENT)
Dept: CALL CENTER | Facility: HOSPITAL | Age: 76
End: 2021-08-18

## 2021-08-18 NOTE — OUTREACH NOTE
General Surgery Week 2 Survey      Responses   [facility] patient discharged from?  [facility]   Does the patient have one of the following disease processes/diagnoses(primary or secondary)?  General Surgery   Week 2 attempt successful?  Yes   Call start time  1821   Call end time  1826   Person spoke with today (if not patient) and taina Lagunay   Eliass reviewed with patient/caregiver?  Yes   Is the patient having any side effects they believe may be caused by any medication additions or changes?  No   Does the patient have all medications related to this admission filled (includes all antibiotics, pain medications, etc.)  Yes   Is the patient taking all medications as directed (includes completed medication regime)?  Yes   Does the patient have a follow up appointment scheduled with their surgeon?  Yes   Has the patient kept scheduled appointments due by today?  Yes   Comments  Wound clinic Tuesday 10:30, has seen PCP once will see again in week   What is the Home health agency?   [facility]  and [facility]   Has home health visited the patient within 72 hours of discharge?  Yes   Psychosocial issues?  No   Comments  Wife does wound care 2 times day will see wound care Tuesday, PICC line, on antibiotics IV, HH comes   Did the patient receive a copy of their discharge instructions?  Yes   Nursing interventions  Reviewed instructions with patient   What is the patient's perception of their health status since discharge?  Improving   Nursing interventions  Nurse provided patient education   Is the patient /caregiver able to teach back basic post-op care?  Continue use of incentive spirometry at least 1 week post discharge, Practice 'cough and deep breath', Drive as instructed by MD in discharge instructions, Take showers only when approved by MD-sponge bathe until then, No tub bath, swimming, or hot tub until instructed by MD, Keep incision areas clean,dry and protected, Do not remove steri-strips, Lifting as  instructed by MD in discharge instructions   Is the patient/caregiver able to teach back signs and symptoms of incisional infection?  Increased redness, swelling or pain at the incisonal site, Increased drainage or bleeding, Incisional warmth, Pus or odor from incision, Fever   Is the patient/caregiver able to teach back steps to recovery at home?  Set small, achievable goals for return to baseline health, Rest and rebuild strength, gradually increase activity, Weigh daily, Practice good oral hygiene, Eat a well-balance diet, Make a list of questions for surgeon's appointment   If the patient is a current smoker, are they able to teach back resources for cessation?  Not a smoker   Is the patient/caregiver able to teach back the hierarchy of who to call/visit for symptoms/problems? PCP, Specialist, Home health nurse, Urgent Care, ED, 911  Yes   Week 2 call completed?  Yes   Wrap up additional comments  Wife, says he is doing some better, will see wound care next week, wound is looking some better changing dressing 2 times day          Ana Luisa Jones, RN

## 2021-08-24 ENCOUNTER — OFFICE VISIT (OUTPATIENT)
Dept: WOUND CARE | Facility: HOSPITAL | Age: 76
End: 2021-08-24

## 2021-08-24 PROCEDURE — G0463 HOSPITAL OUTPT CLINIC VISIT: HCPCS

## 2021-08-25 ENCOUNTER — READMISSION MANAGEMENT (OUTPATIENT)
Dept: CALL CENTER | Facility: HOSPITAL | Age: 76
End: 2021-08-25

## 2021-08-25 NOTE — OUTREACH NOTE
General Surgery Week 3 Survey      Responses   [facility] patient discharged from?  [facility]   Does the patient have one of the following disease processes/diagnoses(primary or secondary)?  General Surgery   Week 3 attempt successful?  Yes   Call start time  1634   Call end time  1636   Meds reviewed with patient/caregiver?  Yes   Is the patient having any side effects they believe may be caused by any medication additions or changes?  No   Does the patient have all medications related to this admission filled (includes all antibiotics, pain medications, etc.)  Yes   Is the patient taking all medications as directed (includes completed medication regime)?  Yes   Does the patient have a follow up appointment scheduled with their surgeon?  Yes   Has the patient kept scheduled appointments due by today?  Yes   What [facility]   Has home health visited the patient within 72 hours of discharge?  Yes   Psychosocial issues?  No   Did the patient receive a copy of their discharge instructions?  Yes   Nursing interventions  Reviewed instructions with patient   What is the patient's perception of their health status since discharge?  Improving   Nursing interventions  Nurse provided patient education   Is the patient /caregiver able to teach back basic post-op care?  Drive as instructed by MD in discharge instructions, Take showers only when approved by MD-sponge bathe until then, No tub bath, swimming, or hot tub until instructed by MD, Lifting as instructed by MD in discharge instructions, Keep incision areas clean,dry and protected   Is the patient/caregiver able to teach back signs and symptoms of incisional infection?  Increased redness, swelling or pain at the incisonal site, Increased drainage or bleeding, Incisional warmth, Fever, Pus or odor from incision   Is the patient/caregiver able to teach back steps to recovery at home?  Set small, achievable goals for return to  baseline health, Rest and rebuild strength, gradually increase activity   If the patient is a current smoker, are they able to teach back resources for cessation?  Not a smoker   Is the patient/caregiver able to teach back the hierarchy of who to call/visit for symptoms/problems? PCP, Specialist, Home health nurse, Urgent Care, ED, 911  Yes   Week 3 call completed?  Yes          Kayleigh Back LPN

## 2021-08-31 ENCOUNTER — OFFICE VISIT (OUTPATIENT)
Dept: WOUND CARE | Facility: HOSPITAL | Age: 76
End: 2021-08-31

## 2021-08-31 PROCEDURE — G0463 HOSPITAL OUTPT CLINIC VISIT: HCPCS

## 2021-09-02 ENCOUNTER — READMISSION MANAGEMENT (OUTPATIENT)
Dept: CALL CENTER | Facility: HOSPITAL | Age: 76
End: 2021-09-02

## 2021-09-02 NOTE — OUTREACH NOTE
General Surgery Week 4 Survey      Responses   [facility] patient discharged from?  [facility]   Does the patient have one of the following disease processes/diagnoses(primary or secondary)?  General Surgery   Week 4 attempt successful?  Yes   Call start time  0816   Call end time  0819   Discharge diagnosis  EXCISIONAL ABDOMINAL WOUND  DEBRIDEMENT,  Abdominal wall cellulitis   Person spoke with today (if not patient) and relationship  Meghan, wife   Is the patient taking all medications as directed (includes completed medication regime)?  Yes   Has the patient kept scheduled appointments due by today?  Yes   Is the patient still receiving Home Health Services?  Yes   Home health comments  SN comes once a week   Comments  Continues with wound care clinic, wife assists with care.   What is the patient's perception of their health status since discharge?  Improving   Week 4 call completed?  Yes   Would the patient like one additional call?  No   Graduated  Yes   Is the patient interested in additional calls from an ambulatory ?  NOTE:  applies to high risk patients requiring additional follow-up.  No   Did the patient feel the follow up calls were helpful during their recovery period?  Yes          Loli Torres RN

## 2021-09-10 ENCOUNTER — OFFICE VISIT (OUTPATIENT)
Dept: BARIATRICS/WEIGHT MGMT | Facility: CLINIC | Age: 76
End: 2021-09-10

## 2021-09-10 VITALS
HEART RATE: 86 BPM | SYSTOLIC BLOOD PRESSURE: 130 MMHG | OXYGEN SATURATION: 97 % | BODY MASS INDEX: 38.22 KG/M2 | DIASTOLIC BLOOD PRESSURE: 80 MMHG | RESPIRATION RATE: 22 BRPM | TEMPERATURE: 98.6 F | HEIGHT: 72 IN | WEIGHT: 282.2 LBS

## 2021-09-10 DIAGNOSIS — K43.6 VENTRAL HERNIA WITH OBSTRUCTION BUT NO GANGRENE: Primary | ICD-10-CM

## 2021-09-10 PROCEDURE — 99024 POSTOP FOLLOW-UP VISIT: CPT | Performed by: SURGERY

## 2021-09-10 RX ORDER — GLIMEPIRIDE 1 MG/1
TABLET ORAL
Status: ON HOLD | COMMUNITY
Start: 2021-08-16 | End: 2021-10-14

## 2021-09-10 RX ORDER — METOPROLOL TARTRATE 50 MG/1
50 TABLET, FILM COATED ORAL 2 TIMES DAILY
Status: ON HOLD | COMMUNITY
Start: 2021-09-09 | End: 2021-10-14

## 2021-09-10 RX ORDER — VANCOMYCIN HYDROCHLORIDE 1 G/20ML
INJECTION, POWDER, LYOPHILIZED, FOR SOLUTION INTRAVENOUS
Status: ON HOLD | COMMUNITY
Start: 2021-08-23 | End: 2021-10-14

## 2021-09-11 NOTE — PROGRESS NOTES
This patient was recently hospitalized for a wound of the abdominal wall.  He had a large nonoperable ventral hernia defect with intestine just under this wound.  Amazingly it has been granulating well in past and he is also been seeing the wound care clinic.  I reviewed the notes and evidently they have been using a product called Hydrofera Blue transfer.    I inspected his wound and he had his wound VAC overlying the wound and I can tell the wound is significantly smaller and there is some epithelialization is occurring and shrinkage of the skin border.  I told him to continue to see wound care surgery will not be necessary.

## 2021-09-14 ENCOUNTER — OFFICE VISIT (OUTPATIENT)
Dept: WOUND CARE | Facility: HOSPITAL | Age: 76
End: 2021-09-14

## 2021-09-14 PROCEDURE — G0463 HOSPITAL OUTPT CLINIC VISIT: HCPCS

## 2021-09-23 ENCOUNTER — OFFICE VISIT (OUTPATIENT)
Dept: CARDIOLOGY | Facility: CLINIC | Age: 76
End: 2021-09-23

## 2021-09-23 VITALS
BODY MASS INDEX: 38.19 KG/M2 | HEART RATE: 85 BPM | SYSTOLIC BLOOD PRESSURE: 113 MMHG | OXYGEN SATURATION: 97 % | DIASTOLIC BLOOD PRESSURE: 87 MMHG | WEIGHT: 282 LBS | HEIGHT: 72 IN

## 2021-09-23 DIAGNOSIS — Z95.0 PRESENCE OF CARDIAC PACEMAKER: ICD-10-CM

## 2021-09-23 DIAGNOSIS — I48.11 LONGSTANDING PERSISTENT ATRIAL FIBRILLATION (HCC): ICD-10-CM

## 2021-09-23 DIAGNOSIS — I10 ESSENTIAL HYPERTENSION: ICD-10-CM

## 2021-09-23 DIAGNOSIS — R07.89 CHEST DISCOMFORT: Primary | ICD-10-CM

## 2021-09-23 DIAGNOSIS — I44.2 AV BLOCK, COMPLETE (HCC): ICD-10-CM

## 2021-09-23 DIAGNOSIS — Z79.01 CHRONIC ANTICOAGULATION: ICD-10-CM

## 2021-09-23 DIAGNOSIS — E78.5 DYSLIPIDEMIA: ICD-10-CM

## 2021-09-23 DIAGNOSIS — I48.21 PERMANENT ATRIAL FIBRILLATION (HCC): ICD-10-CM

## 2021-09-23 DIAGNOSIS — Z95.820 STATUS POST ANGIOPLASTY WITH STENT: ICD-10-CM

## 2021-09-23 PROCEDURE — 99214 OFFICE O/P EST MOD 30 MIN: CPT | Performed by: INTERNAL MEDICINE

## 2021-09-23 PROCEDURE — 93000 ELECTROCARDIOGRAM COMPLETE: CPT | Performed by: INTERNAL MEDICINE

## 2021-09-23 NOTE — PROGRESS NOTES
Encounter Date:09/23/2021  Last seen 7/23/2021      Patient ID: Ro Nathan is a 76 y.o. male.    Chief Complaint:  Status post stent  Chest discomfort-new problem  Hypertension  Dyslipidemia  Diabetes  Chronic atrial fibrillation  Anticoagulation management    History of Present Illness  Lately for last couple of weeks patient has been having increased episodes of chest discomfort heaviness and tightness across the chest with exertion associated with sweating and nausea.  Also has chronic shortness of breath.  No other associated aggravating or elevating factors.    Patient is not having any palpitations, dizziness or syncope.  Denies having any headache ,abdominal pain ,nausea, vomiting , diarrhea constipation, loss of weight or loss of appetite.  Denies having any excessive bruising ,hematuria or blood in the stool.    Review of all systems negative except as indicated.    Reviewed ROS.      Assessment and Plan       ]]]]]]]]]]]]]]]]]]]]]  Impression  ========  -Exertional chest discomfort suggestive of angina pectoris.    -Status post stent placement   Status post stent to proximal LAD 12/5/2019.  Patient had stent to circumflex and RCA in the past.     Cardiac catheterization 12/5/2019 by Dr. Munson  Left ventriculography  The overall estimated left ventricular ejection fraction was 65%.  Native coronary arteriography: Left dominant circulation     1.  Left main coronary arteries a large-caliber vessel gives rise left anterior descending left circumflex flex arteries.  There is an ostial eccentric 40% lesion that appears angiographically to approach 50%. No significant coronary atherosclerotic disease present.  2.  Left anterior descending artery is a large-caliber vessel that gives rise to large caliber diagonal branches and courses along the anterior interventricular groove and wraps around the apex.  There is a proximal eccentric 50% lesion within an in-stent restenotic segment followed by an greater  than 80% focal eccentric in-stent restenotic lesion followed by a 60% lesion after the stented segment and otherwise no coronary atherosclerotic disease of any significance present.  3.  The left circumflex arteries a large-caliber vessel gives rise to large caliber bifurcating obtuse marginal branch.  There is an ostial proximal concentric 50% lesion that is calcified that immediately gives rise to a very high first obtuse marginal branch that is best classified as a ramus intermedius branch supplying large territory in the anterolateral wall that has an ostial proximal 70% lesion.  The calcified continuing circumflex and the posterior AV groove gives a second obtuse marginal branch that has mild diffuse disease, a third obtuse marginal branch and then a bifurcating system that has a bifurcation stenting within it before giving rise to the left PDA.  There is diffuse 50% in-stent restenosis within this bifurcation stented segment into the obtuse marginal branch which itself has diffuse 50 to 60% disease within the stented segment.  There is a concentric 80% lesion in the circumflex PDA.  No significant coronary atherosclerotic disease present  4.  The right coronary arteries a large-caliber nondominant vessel has a stented mid segment after which there several acute marginal branches there is 50% in-stent restenosis approaching 60% at some portions within this nondominant right proximal to mid proximal segment.     Conclusions:     1.  Normal left heart filling pressures with preserved LV systolic function  2.  Normal epicardial anatomy with severe two-vessel coronary artery disease involving what is likely the culprit for the patient's chest pain syndrome, namely the very severe proximal LAD lesions.  We will treat the remaining circumflex lesions with medical therapy and see if the patient's symptoms resolved; this includes the severe lesion within the proximal portion of the high obtuse marginal branch within  the circumflex system as well.     -Status post permanent ventricular pacemaker implantation ([employer] 1/13/2015)  Battery status is 4.5 years.    -History of fever and sepsis.  Positive blood cultures for coagulase-negative staph.      EMBER 7/22/2021 revealed  No evidence for valvular vegetations is present.  Significant mitral regurgitation with central jet extending to the lateral wall and into the superior aspect of the left atrium.  Mild tricuspid regurgitation.  Calculated pulmonary artery pressure is 25 mmHg.  Left atrial and left atrial appendage enlargement is present without clot.  Normal left ventricle size and contractility with ejection fraction of 60%.     -Dyslipidemia diabetes hypertension COPD CKD 3.  History of cirrhosis     -Chronic atrial fibrillation     -History of right foot ulceration with bone involvement.     -Peripheral vascular disease     -Large abdominal ventral hernia.     -Status post appendectomy appendectomy cholecystectomy left elbow surgery hernia repair     -Allergic to morphine pantoprazole haloperidol  ===========  Plan  ========  Exertional chest discomfort and shortness of breath  Status post stent 12/5/2019.  EKG showed ventricular pacemaker rhythm.  Intrinsic rhythm is atrial fibrillation  Rule out progression of coronary artery disease.  Stress Cardiolite test  Echocardiogram    Status post pacemaker.  Patient had recently interrogation of the pacemaker today revealed good pacing parameters with battery status of 4.5 years.   Patient is almost 99.7% paced in the ventricle.     Chronic atrial fibrillation.-Rate controlled     Anticoagulation was reviewed.  Patient is on Eliquis     Hypertension-113/77    Dyslipidemia-continue atorvastatin     Follow-up in the office on the same day.Further plan will depend on patient's progress.  ]]]]]]]]]]]]]]]]]]         Diagnosis Plan   1. Chest discomfort  Adult Transthoracic Echo Complete W/ Cont if Necessary Per Protocol    Stress  Test With Myocardial Perfusion One Day   2. Presence of cardiac pacemaker  Adult Transthoracic Echo Complete W/ Cont if Necessary Per Protocol    Stress Test With Myocardial Perfusion One Day   3. Permanent atrial fibrillation (CMS/HCC)  Adult Transthoracic Echo Complete W/ Cont if Necessary Per Protocol    Stress Test With Myocardial Perfusion One Day   4. Status post angioplasty with stent  Adult Transthoracic Echo Complete W/ Cont if Necessary Per Protocol    Stress Test With Myocardial Perfusion One Day   5. Dyslipidemia  Adult Transthoracic Echo Complete W/ Cont if Necessary Per Protocol    Stress Test With Myocardial Perfusion One Day   6. Chronic anticoagulation  Adult Transthoracic Echo Complete W/ Cont if Necessary Per Protocol    Stress Test With Myocardial Perfusion One Day   7. Essential hypertension  Adult Transthoracic Echo Complete W/ Cont if Necessary Per Protocol    Stress Test With Myocardial Perfusion One Day   8. Longstanding persistent atrial fibrillation (CMS/HCC)  Adult Transthoracic Echo Complete W/ Cont if Necessary Per Protocol    Stress Test With Myocardial Perfusion One Day   9. AV block, complete (CMS/HCC)  Adult Transthoracic Echo Complete W/ Cont if Necessary Per Protocol    Stress Test With Myocardial Perfusion One Day   LAB RESULTS (LAST 7 DAYS)    CBC        BMP        CMP         BNP        TROPONIN        CoAg        Creatinine Clearance  CrCl cannot be calculated (Patient's most recent lab result is older than the maximum 30 days allowed.).    ABG        Radiology  No radiology results for the last day                The following portions of the patient's history were reviewed and updated as appropriate: allergies, current medications, past family history, past medical history, past social history, past surgical history and problem list.    Review of Systems   Constitutional: Negative for fever and malaise/fatigue.   Cardiovascular: Positive for chest pain and dyspnea on  exertion. Negative for palpitations.   Respiratory: Negative for cough and shortness of breath.    Skin: Negative for rash.   Gastrointestinal: Negative for abdominal pain, nausea and vomiting.   Neurological: Negative for focal weakness and headaches.   All other systems reviewed and are negative.        Current Outpatient Medications:   •  apixaban (ELIQUIS) 5 MG tablet tablet, Take 5 mg by mouth 2 (Two) Times a Day., Disp: , Rfl:   •  aspirin 81 MG EC tablet, Take 1 tablet by mouth Daily., Disp: 30 tablet, Rfl: 5  •  atorvastatin (LIPITOR) 40 MG tablet, Take 40 mg by mouth Every Morning., Disp: , Rfl:   •  budesonide (PULMICORT) 0.5 MG/2ML nebulizer solution, Take 0.5 mg by nebulization 2 (Two) Times a Day As Needed., Disp: , Rfl:   •  clopidogrel (PLAVIX) 75 MG tablet, Take 75 mg by mouth Every Morning., Disp: , Rfl:   •  furosemide (LASIX) 20 MG tablet, Take 20 mg by mouth Every Morning., Disp: , Rfl:   •  glimepiride (AMARYL) 1 MG tablet, , Disp: , Rfl:   •  ipratropium-albuterol (DUO-NEB) 0.5-2.5 mg/3 ml nebulizer, 3 mL 4 (Four) Times a Day As Needed., Disp: , Rfl:   •  losartan (COZAAR) 50 MG tablet, Take 50 mg by mouth Every Morning., Disp: , Rfl:   •  metoprolol tartrate (LOPRESSOR) 25 MG tablet, Take 25 mg by mouth 2 (Two) Times a Day., Disp: , Rfl:   •  metoprolol tartrate (LOPRESSOR) 50 MG tablet, , Disp: , Rfl:   •  miconazole (MICOTIN) 2 % cream, Apply 1 application topically to the appropriate area as directed Every 12 (Twelve) Hours., Disp: 60 g, Rfl: 1  •  mirtazapine (REMERON) 30 MG tablet, Take 30 mg by mouth Every Night., Disp: , Rfl:   •  nitroglycerin (NITROSTAT) 0.4 MG SL tablet, Place 0.4 mg under the tongue Every 5 (Five) Minutes As Needed for Chest Pain., Disp: , Rfl:   •  oxyCODONE-acetaminophen (PERCOCET)  MG per tablet, Take 1 tablet by mouth Every 6 (Six) Hours As Needed for Moderate Pain ., Disp: 120 tablet, Rfl: 0  •  oxyCODONE-acetaminophen (Percocet)  MG per tablet,  Take 1 tablet by mouth Every 6 (Six) Hours As Needed for Moderate Pain ., Disp: 120 tablet, Rfl: 0  •  oxyCODONE-acetaminophen (Percocet)  MG per tablet, Take 1 tablet by mouth Every 6 (Six) Hours As Needed for Moderate Pain ., Disp: 120 tablet, Rfl: 0  •  Perforomist 20 MCG/2ML nebulizer solution, 20 mcg 2 (Two) Times a Day As Needed., Disp: , Rfl:   •  potassium chloride (K-DUR) 10 MEQ CR tablet, Take 10 mEq by mouth 3 (Three) Times a Day With Meals., Disp: , Rfl:   •  rOPINIRole (REQUIP) 0.5 MG tablet, Take 0.5 mg by mouth Every Night., Disp: , Rfl:   •  traZODone (DESYREL) 100 MG tablet, Take 100 mg by mouth Every Night., Disp: , Rfl:   •  vancomycin (VANCOCIN) 1 g injection, , Disp: , Rfl:   •  VENTOLIN  (90 Base) MCG/ACT inhaler, Inhale 2 puffs Every 6 (Six) Hours As Needed for Wheezing or Shortness of Air., Disp: , Rfl:   •  pregabalin (LYRICA) 150 MG capsule, TAKE ONE CAPSULE BY MOUTH TWICE A DAY, Disp: 60 capsule, Rfl: 1    Allergies   Allergen Reactions   • Haloperidol Hives   • Morphine Itching   • Pantoprazole Other (See Comments)     Feels sick after receiving   • Latex Unknown - High Severity       Family History   Problem Relation Age of Onset   • Alzheimer's disease Mother    • GI problems Father    • Heart disease Father        Past Surgical History:   Procedure Laterality Date   • ABDOMINAL SURGERY     • APPENDECTOMY     • BONE CURETTAGE Right 1/22/2021    Procedure: Wound excision with fifth metatarsal head resection, right foot.;  Surgeon: Josef Egan DPM;  Location: [facility] MAIN OR;  Service: Podiatry;  Laterality: Right;   • BONE INCISION AND DRAINAGE Right 10/5/2020    Procedure: Incision and drainage with debridement of all nonviable soft tissue and bone with bone biopsy, right foot.;  Surgeon: Josef Egan DPM;  Location: [facility] MAIN OR;  Service: Podiatry;  Laterality: Right;   • CARDIAC CATHETERIZATION N/A 12/5/2019    Procedure: Left Heart Cath;  Surgeon:  Mirza Munson MD;  Location: [facility] CATH INVASIVE LOCATION;  Service: Cardiology   • CARDIAC CATHETERIZATION N/A 12/5/2019    Procedure: Stent MICHAEL coronary;  Surgeon: Mirza Munson MD;  Location: [facility] CATH INVASIVE LOCATION;  Service: Cardiology   • CHOLECYSTECTOMY     • ELBOW PROCEDURE      left   • FOOT SURGERY      right foot   • HERNIA REPAIR     • INCISION AND DRAINAGE OF WOUND Right 4/6/2021    Procedure: INCISION AND DRAINAGE OF RIGHT FOOT 5TH METATARSAL TO BONE AND PARTIAL 5TH METATARSAL RESECTION;  Surgeon: Josef Egan DPM;  Location: [facility] MAIN OR;  Service: Podiatry;  Laterality: Right;   • INCISION AND DRAINAGE OF WOUND Right 4/8/2021    Procedure: INCISION AND DRAINAGE BONE, DELAYED PRIMARY CLOSURE;  Surgeon: Josef Egan DPM;  Location: [facility] MAIN OR;  Service: Podiatry;  Laterality: Right;   • INTERVENTIONAL RADIOLOGY PROCEDURE N/A 12/5/2019    Procedure: Intravascular Ultrasound;  Surgeon: Mirza Munson MD;  Location: [facility] CATH INVASIVE LOCATION;  Service: Cardiology   • PACEMAKER IMPLANTATION     • WA RT/LT HEART CATHETERS N/A 12/5/2019    Procedure: Percutaneous Coronary Intervention;  Surgeon: Mirza Munson MD;  Location: [facility] CATH INVASIVE LOCATION;  Service: Cardiology   • WOUND DEBRIDEMENT Right 10/29/2020    Procedure: Preparation and debridement of foot wound with application of Integra wound graft, right foot.;  Surgeon: Josef Egan DPM;  Location: [facility] MAIN OR;  Service: Podiatry;  Laterality: Right;   • WOUND DEBRIDEMENT N/A 7/29/2021    Procedure: EXCISIONAL ABDOMINAL WOUND  DEBRIDEMENT;  Surgeon: Cleopatra Wang MD;  Location: [facility] MAIN OR;  Service: General;  Laterality: N/A;   • WOUND DEBRIDEMENT N/A 8/1/2021    Procedure: DEBRIDEMENT OF ABDOMINAL WALL;  Surgeon: Hill Bhatia DO;  Location: [facility] MAIN OR;  Service: General;  Laterality: N/A;       Past Medical History:   Diagnosis Date   • Alcoholic  cirrhosis of liver with ascites (CMS/HCC) 10/26/2020   • Benign hypertension with CKD (chronic kidney disease) stage III (CMS/HCC) 10/26/2020   • Bruises easily    • CHF (congestive heart failure) (CMS/HCC)    • Chronic bronchitis with COPD (chronic obstructive pulmonary disease) (CMS/HCC) 10/26/2020   • Chronic respiratory failure with hypoxia (CMS/HCC) 10/26/2020   • Congenital heart defect    • Difficulty attaining erection    • Hypertension    • Low back pain    • Pacemaker    • Peripheral vascular disease due to secondary diabetes (CMS/HCC) 10/26/2020   • Poor circulation    • Prostate cancer (CMS/HCC)     prostate   • Shortness of breath    • Sleep apnea     using CPAP    • Stage 3a chronic kidney disease (CMS/HCC) 10/26/2020   • Stented coronary artery 2019    MICHAEL to LAD   • Type 2 diabetes, controlled, with neuropathy (CMS/HCC) 10/26/2020    no meds   • Type 2 diabetes, controlled, with neuropathy (CMS/HCC) 10/26/2020       Family History   Problem Relation Age of Onset   • Alzheimer's disease Mother    • GI problems Father    • Heart disease Father        Social History     Socioeconomic History   • Marital status:      Spouse name: Not on file   • Number of children: Not on file   • Years of education: Not on file   • Highest education level: Not on file   Tobacco Use   • Smoking status: Former Smoker     Years: 15.00     Types: Cigarettes     Start date: 1966     Quit date: 5/10/2020     Years since quittin.3   • Smokeless tobacco: Never Used   Vaping Use   • Vaping Use: Never used   Substance and Sexual Activity   • Alcohol use: Yes     Alcohol/week: 4.0 standard drinks     Types: 4 Shots of liquor per week     Comment: maybe 4 shots per month   • Drug use: No   • Sexual activity: Defer           ECG 12 Lead    Date/Time: 2021 3:48 PM  Performed by: Celine Swanson MD  Authorized by: Celine Swanson MD   Comparison: compared with previous ECG   Similar to previous  "ECG  Comparison to previous ECG: Ventricular pacemaker rhythm 84/min intrinsic rhythm is atrial fibrillation nonspecific ST-T wave changes no ectopy no significant change from 7/16/2021                Objective:       Physical Exam    /87   Pulse 85   Ht 182.9 cm (72.01\")   Wt 128 kg (282 lb)   SpO2 97%   BMI 38.24 kg/m²   The patient is alert, oriented and in no distress.    Vital signs as noted above.    Head and neck revealed no carotid bruits or jugular venous distension.  No thyromegaly or lymphadenopathy is present.    Lungs clear.  No wheezing.  Breath sounds are normal bilaterally.    Heart normal first and second heart sounds.  No murmur..  No pericardial rub is present.  No gallop is present.    Abdomen soft and nontender.  No organomegaly is present.    Extremities revealed good peripheral pulses without any pedal edema.    Skin warm and dry.  Pacemaker site looks normal.    Musculoskeletal system is grossly normal.    CNS grossly normal.        "

## 2021-09-25 DIAGNOSIS — M54.16 LUMBAR RADICULOPATHY: ICD-10-CM

## 2021-09-27 RX ORDER — PREGABALIN 150 MG/1
CAPSULE ORAL
Qty: 60 CAPSULE | Refills: 2 | Status: SHIPPED | OUTPATIENT
Start: 2021-09-27 | End: 2022-01-01 | Stop reason: SDUPTHER

## 2021-09-28 ENCOUNTER — LAB (OUTPATIENT)
Dept: LAB | Facility: HOSPITAL | Age: 76
End: 2021-09-28

## 2021-09-28 ENCOUNTER — OFFICE VISIT (OUTPATIENT)
Dept: WOUND CARE | Facility: HOSPITAL | Age: 76
End: 2021-09-28

## 2021-09-28 ENCOUNTER — TRANSCRIBE ORDERS (OUTPATIENT)
Dept: ADMINISTRATIVE | Facility: HOSPITAL | Age: 76
End: 2021-09-28

## 2021-09-28 DIAGNOSIS — E11.8 DIABETIC COMPLICATION (HCC): ICD-10-CM

## 2021-09-28 DIAGNOSIS — N18.31 CHRONIC KIDNEY DISEASE (CKD) STAGE G3A/A1, MODERATELY DECREASED GLOMERULAR FILTRATION RATE (GFR) BETWEEN 45-59 ML/MIN/1.73 SQUARE METER AND ALBUMINURIA CREATININE RATIO LESS THAN 30 MG/G (CMS/H* (HCC): Primary | ICD-10-CM

## 2021-09-28 DIAGNOSIS — N18.31 CHRONIC KIDNEY DISEASE (CKD) STAGE G3A/A1, MODERATELY DECREASED GLOMERULAR FILTRATION RATE (GFR) BETWEEN 45-59 ML/MIN/1.73 SQUARE METER AND ALBUMINURIA CREATININE RATIO LESS THAN 30 MG/G (CMS/H* (HCC): ICD-10-CM

## 2021-09-28 LAB
25(OH)D3 SERPL-MCNC: 18.5 NG/ML (ref 30–100)
ALBUMIN SERPL-MCNC: 4.1 G/DL (ref 3.5–5.2)
ALBUMIN/GLOB SERPL: 1.4 G/DL
ALP SERPL-CCNC: 150 U/L (ref 39–117)
ALT SERPL W P-5'-P-CCNC: 19 U/L (ref 1–41)
ANION GAP SERPL CALCULATED.3IONS-SCNC: 10.8 MMOL/L (ref 5–15)
AST SERPL-CCNC: 24 U/L (ref 1–40)
BACTERIA UR QL AUTO: ABNORMAL /HPF
BASOPHILS # BLD AUTO: 0.11 10*3/MM3 (ref 0–0.2)
BASOPHILS NFR BLD AUTO: 1.1 % (ref 0–1.5)
BILIRUB SERPL-MCNC: 0.7 MG/DL (ref 0–1.2)
BILIRUB UR QL STRIP: NEGATIVE
BUN SERPL-MCNC: 20 MG/DL (ref 8–23)
BUN/CREAT SERPL: 16.9 (ref 7–25)
CALCIUM SPEC-SCNC: 9.3 MG/DL (ref 8.6–10.5)
CHLORIDE SERPL-SCNC: 101 MMOL/L (ref 98–107)
CK SERPL-CCNC: 69 U/L (ref 20–200)
CLARITY UR: CLEAR
CO2 SERPL-SCNC: 27.2 MMOL/L (ref 22–29)
COLOR UR: YELLOW
CREAT SERPL-MCNC: 1.18 MG/DL (ref 0.76–1.27)
DEPRECATED RDW RBC AUTO: 51.6 FL (ref 37–54)
EOSINOPHIL # BLD AUTO: 0.36 10*3/MM3 (ref 0–0.4)
EOSINOPHIL NFR BLD AUTO: 3.6 % (ref 0.3–6.2)
ERYTHROCYTE [DISTWIDTH] IN BLOOD BY AUTOMATED COUNT: 15.9 % (ref 12.3–15.4)
GFR SERPL CREATININE-BSD FRML MDRD: 60 ML/MIN/1.73
GLOBULIN UR ELPH-MCNC: 3 GM/DL
GLUCOSE SERPL-MCNC: 202 MG/DL (ref 65–99)
GLUCOSE UR STRIP-MCNC: ABNORMAL MG/DL
HBA1C MFR BLD: 6.7 % (ref 3.5–5.6)
HCT VFR BLD AUTO: 39.8 % (ref 37.5–51)
HGB BLD-MCNC: 12.5 G/DL (ref 13–17.7)
HGB UR QL STRIP.AUTO: ABNORMAL
HYALINE CASTS UR QL AUTO: ABNORMAL /LPF
IMM GRANULOCYTES # BLD AUTO: 0.03 10*3/MM3 (ref 0–0.05)
IMM GRANULOCYTES NFR BLD AUTO: 0.3 % (ref 0–0.5)
KETONES UR QL STRIP: NEGATIVE
LEUKOCYTE ESTERASE UR QL STRIP.AUTO: NEGATIVE
LYMPHOCYTES # BLD AUTO: 1.5 10*3/MM3 (ref 0.7–3.1)
LYMPHOCYTES NFR BLD AUTO: 15.1 % (ref 19.6–45.3)
MAGNESIUM SERPL-MCNC: 1.8 MG/DL (ref 1.6–2.4)
MCH RBC QN AUTO: 28.2 PG (ref 26.6–33)
MCHC RBC AUTO-ENTMCNC: 31.4 G/DL (ref 31.5–35.7)
MCV RBC AUTO: 89.8 FL (ref 79–97)
MONOCYTES # BLD AUTO: 0.72 10*3/MM3 (ref 0.1–0.9)
MONOCYTES NFR BLD AUTO: 7.2 % (ref 5–12)
NEUTROPHILS NFR BLD AUTO: 7.22 10*3/MM3 (ref 1.7–7)
NEUTROPHILS NFR BLD AUTO: 72.7 % (ref 42.7–76)
NITRITE UR QL STRIP: NEGATIVE
NRBC BLD AUTO-RTO: 0 /100 WBC (ref 0–0.2)
PH UR STRIP.AUTO: 6 [PH] (ref 5–8)
PHOSPHATE SERPL-MCNC: 3.8 MG/DL (ref 2.5–4.5)
PLATELET # BLD AUTO: 370 10*3/MM3 (ref 140–450)
PMV BLD AUTO: 11 FL (ref 6–12)
POTASSIUM SERPL-SCNC: 4.9 MMOL/L (ref 3.5–5.2)
PROT SERPL-MCNC: 7.1 G/DL (ref 6–8.5)
PROT UR QL STRIP: NEGATIVE
PROT UR-MCNC: 5 MG/DL
PTH-INTACT SERPL-MCNC: 54.2 PG/ML (ref 15–65)
RBC # BLD AUTO: 4.43 10*6/MM3 (ref 4.14–5.8)
RBC # UR: ABNORMAL /HPF
REF LAB TEST METHOD: ABNORMAL
SODIUM SERPL-SCNC: 139 MMOL/L (ref 136–145)
SP GR UR STRIP: 1.01 (ref 1–1.03)
SQUAMOUS #/AREA URNS HPF: ABNORMAL /HPF
URATE SERPL-MCNC: 4.7 MG/DL (ref 3.4–7)
UROBILINOGEN UR QL STRIP: ABNORMAL
WBC # BLD AUTO: 9.94 10*3/MM3 (ref 3.4–10.8)
WBC UR QL AUTO: ABNORMAL /HPF

## 2021-09-28 PROCEDURE — 36415 COLL VENOUS BLD VENIPUNCTURE: CPT

## 2021-09-28 PROCEDURE — 82550 ASSAY OF CK (CPK): CPT

## 2021-09-28 PROCEDURE — G0463 HOSPITAL OUTPT CLINIC VISIT: HCPCS

## 2021-09-28 PROCEDURE — 84156 ASSAY OF PROTEIN URINE: CPT

## 2021-09-28 PROCEDURE — 81001 URINALYSIS AUTO W/SCOPE: CPT

## 2021-09-28 PROCEDURE — 80053 COMPREHEN METABOLIC PANEL: CPT

## 2021-09-28 PROCEDURE — 82306 VITAMIN D 25 HYDROXY: CPT

## 2021-09-28 PROCEDURE — 84100 ASSAY OF PHOSPHORUS: CPT

## 2021-09-28 PROCEDURE — 84550 ASSAY OF BLOOD/URIC ACID: CPT

## 2021-09-28 PROCEDURE — 83735 ASSAY OF MAGNESIUM: CPT

## 2021-09-28 PROCEDURE — 83970 ASSAY OF PARATHORMONE: CPT

## 2021-09-28 PROCEDURE — 83036 HEMOGLOBIN GLYCOSYLATED A1C: CPT

## 2021-09-28 PROCEDURE — 85025 COMPLETE CBC W/AUTO DIFF WBC: CPT

## 2021-10-05 ENCOUNTER — APPOINTMENT (OUTPATIENT)
Dept: CARDIOLOGY | Facility: HOSPITAL | Age: 76
End: 2021-10-05

## 2021-10-05 ENCOUNTER — APPOINTMENT (OUTPATIENT)
Dept: WOUND CARE | Facility: HOSPITAL | Age: 76
End: 2021-10-05

## 2021-10-11 ENCOUNTER — HOSPITAL ENCOUNTER (OUTPATIENT)
Dept: CARDIOLOGY | Facility: HOSPITAL | Age: 76
Discharge: HOME | End: 2021-10-11

## 2021-10-11 ENCOUNTER — HOSPITAL ENCOUNTER (OUTPATIENT)
Dept: CARDIOLOGY | Facility: HOSPITAL | Age: 76
End: 2021-10-11

## 2021-10-11 DIAGNOSIS — R07.89 CHEST DISCOMFORT: ICD-10-CM

## 2021-10-11 DIAGNOSIS — E78.5 DYSLIPIDEMIA: ICD-10-CM

## 2021-10-11 DIAGNOSIS — Z95.0 PRESENCE OF CARDIAC PACEMAKER: ICD-10-CM

## 2021-10-11 DIAGNOSIS — I10 ESSENTIAL HYPERTENSION: ICD-10-CM

## 2021-10-11 DIAGNOSIS — I44.2 AV BLOCK, COMPLETE (HCC): ICD-10-CM

## 2021-10-11 DIAGNOSIS — Z79.01 CHRONIC ANTICOAGULATION: ICD-10-CM

## 2021-10-11 DIAGNOSIS — Z95.820 STATUS POST ANGIOPLASTY WITH STENT: ICD-10-CM

## 2021-10-11 DIAGNOSIS — I48.11 LONGSTANDING PERSISTENT ATRIAL FIBRILLATION (HCC): ICD-10-CM

## 2021-10-11 DIAGNOSIS — I48.21 PERMANENT ATRIAL FIBRILLATION (HCC): ICD-10-CM

## 2021-10-11 LAB
MAXIMAL PREDICTED HEART RATE: 145 BPM
STRESS TARGET HR: 123 BPM

## 2021-10-12 ENCOUNTER — OFFICE VISIT (OUTPATIENT)
Dept: WOUND CARE | Facility: HOSPITAL | Age: 76
End: 2021-10-12

## 2021-10-12 PROCEDURE — G0463 HOSPITAL OUTPT CLINIC VISIT: HCPCS

## 2021-10-14 ENCOUNTER — TELEPHONE (OUTPATIENT)
Dept: CARDIOLOGY | Facility: CLINIC | Age: 76
End: 2021-10-14

## 2021-10-14 ENCOUNTER — HOSPITAL ENCOUNTER (INPATIENT)
Facility: HOSPITAL | Age: 76
LOS: 3 days | Discharge: HOME HEALTH | End: 2021-10-21
Attending: INTERNAL MEDICINE | Admitting: INTERNAL MEDICINE

## 2021-10-14 ENCOUNTER — APPOINTMENT (OUTPATIENT)
Dept: GENERAL RADIOLOGY | Facility: HOSPITAL | Age: 76
End: 2021-10-14

## 2021-10-14 DIAGNOSIS — I20.8 ANGINA AT REST (HCC): Primary | ICD-10-CM

## 2021-10-14 DIAGNOSIS — I44.2 AV BLOCK, COMPLETE (HCC): ICD-10-CM

## 2021-10-14 DIAGNOSIS — I20.8 ANGINA OF EFFORT (HCC): ICD-10-CM

## 2021-10-14 DIAGNOSIS — Z95.5 PRESENCE OF CORONARY ANGIOPLASTY IMPLANT AND GRAFT: ICD-10-CM

## 2021-10-14 LAB
ALBUMIN SERPL-MCNC: 3.7 G/DL (ref 3.5–5.2)
ALBUMIN/GLOB SERPL: 1.2 G/DL
ALP SERPL-CCNC: 166 U/L (ref 39–117)
ALT SERPL W P-5'-P-CCNC: 11 U/L (ref 1–41)
ANION GAP SERPL CALCULATED.3IONS-SCNC: 12 MMOL/L (ref 5–15)
AST SERPL-CCNC: 14 U/L (ref 1–40)
BASOPHILS # BLD AUTO: 0.1 10*3/MM3 (ref 0–0.2)
BASOPHILS NFR BLD AUTO: 1 % (ref 0–1.5)
BILIRUB SERPL-MCNC: 0.6 MG/DL (ref 0–1.2)
BUN SERPL-MCNC: 21 MG/DL (ref 8–23)
BUN/CREAT SERPL: 19.6 (ref 7–25)
CALCIUM SPEC-SCNC: 8.8 MG/DL (ref 8.6–10.5)
CHLORIDE SERPL-SCNC: 99 MMOL/L (ref 98–107)
CO2 SERPL-SCNC: 24 MMOL/L (ref 22–29)
CREAT SERPL-MCNC: 1.07 MG/DL (ref 0.76–1.27)
DEPRECATED RDW RBC AUTO: 54.7 FL (ref 37–54)
EOSINOPHIL # BLD AUTO: 0.3 10*3/MM3 (ref 0–0.4)
EOSINOPHIL NFR BLD AUTO: 3.7 % (ref 0.3–6.2)
ERYTHROCYTE [DISTWIDTH] IN BLOOD BY AUTOMATED COUNT: 18.7 % (ref 12.3–15.4)
GFR SERPL CREATININE-BSD FRML MDRD: 67 ML/MIN/1.73
GLOBULIN UR ELPH-MCNC: 3.1 GM/DL
GLUCOSE BLDC GLUCOMTR-MCNC: 164 MG/DL (ref 70–105)
GLUCOSE SERPL-MCNC: 143 MG/DL (ref 65–99)
HCT VFR BLD AUTO: 35.1 % (ref 37.5–51)
HGB BLD-MCNC: 12.1 G/DL (ref 13–17.7)
LYMPHOCYTES # BLD AUTO: 1.4 10*3/MM3 (ref 0.7–3.1)
LYMPHOCYTES NFR BLD AUTO: 17.5 % (ref 19.6–45.3)
MCH RBC QN AUTO: 28.9 PG (ref 26.6–33)
MCHC RBC AUTO-ENTMCNC: 34.6 G/DL (ref 31.5–35.7)
MCV RBC AUTO: 83.5 FL (ref 79–97)
MONOCYTES # BLD AUTO: 0.7 10*3/MM3 (ref 0.1–0.9)
MONOCYTES NFR BLD AUTO: 8.1 % (ref 5–12)
NEUTROPHILS NFR BLD AUTO: 5.6 10*3/MM3 (ref 1.7–7)
NEUTROPHILS NFR BLD AUTO: 69.7 % (ref 42.7–76)
NRBC BLD AUTO-RTO: 0 /100 WBC (ref 0–0.2)
NT-PROBNP SERPL-MCNC: 722.7 PG/ML (ref 0–1800)
PLATELET # BLD AUTO: 304 10*3/MM3 (ref 140–450)
PMV BLD AUTO: 8 FL (ref 6–12)
POTASSIUM SERPL-SCNC: 4.6 MMOL/L (ref 3.5–5.2)
PROT SERPL-MCNC: 6.8 G/DL (ref 6–8.5)
RBC # BLD AUTO: 4.2 10*6/MM3 (ref 4.14–5.8)
SARS-COV-2 RNA PNL SPEC NAA+PROBE: NOT DETECTED
SODIUM SERPL-SCNC: 135 MMOL/L (ref 136–145)
TROPONIN T SERPL-MCNC: <0.01 NG/ML (ref 0–0.03)
WBC # BLD AUTO: 8.1 10*3/MM3 (ref 3.4–10.8)

## 2021-10-14 PROCEDURE — 93005 ELECTROCARDIOGRAM TRACING: CPT | Performed by: FAMILY MEDICINE

## 2021-10-14 PROCEDURE — 93005 ELECTROCARDIOGRAM TRACING: CPT | Performed by: INTERNAL MEDICINE

## 2021-10-14 PROCEDURE — 93010 ELECTROCARDIOGRAM REPORT: CPT | Performed by: INTERNAL MEDICINE

## 2021-10-14 PROCEDURE — 80053 COMPREHEN METABOLIC PANEL: CPT | Performed by: NURSE PRACTITIONER

## 2021-10-14 PROCEDURE — 87635 SARS-COV-2 COVID-19 AMP PRB: CPT | Performed by: FAMILY MEDICINE

## 2021-10-14 PROCEDURE — G0378 HOSPITAL OBSERVATION PER HR: HCPCS

## 2021-10-14 PROCEDURE — 84484 ASSAY OF TROPONIN QUANT: CPT | Performed by: NURSE PRACTITIONER

## 2021-10-14 PROCEDURE — 99284 EMERGENCY DEPT VISIT MOD MDM: CPT

## 2021-10-14 PROCEDURE — 94640 AIRWAY INHALATION TREATMENT: CPT

## 2021-10-14 PROCEDURE — 85025 COMPLETE CBC W/AUTO DIFF WBC: CPT | Performed by: NURSE PRACTITIONER

## 2021-10-14 PROCEDURE — 82962 GLUCOSE BLOOD TEST: CPT

## 2021-10-14 PROCEDURE — 71046 X-RAY EXAM CHEST 2 VIEWS: CPT

## 2021-10-14 PROCEDURE — 93005 ELECTROCARDIOGRAM TRACING: CPT

## 2021-10-14 PROCEDURE — 83880 ASSAY OF NATRIURETIC PEPTIDE: CPT | Performed by: NURSE PRACTITIONER

## 2021-10-14 RX ORDER — POTASSIUM CHLORIDE 750 MG/1
10 TABLET, FILM COATED, EXTENDED RELEASE ORAL
Status: DISCONTINUED | OUTPATIENT
Start: 2021-10-15 | End: 2021-10-19

## 2021-10-14 RX ORDER — TRAZODONE HYDROCHLORIDE 100 MG/1
100 TABLET ORAL NIGHTLY
Status: DISCONTINUED | OUTPATIENT
Start: 2021-10-14 | End: 2021-10-21 | Stop reason: HOSPADM

## 2021-10-14 RX ORDER — IPRATROPIUM BROMIDE AND ALBUTEROL SULFATE 2.5; .5 MG/3ML; MG/3ML
3 SOLUTION RESPIRATORY (INHALATION) ONCE
Status: COMPLETED | OUTPATIENT
Start: 2021-10-14 | End: 2021-10-14

## 2021-10-14 RX ORDER — ONDANSETRON 2 MG/ML
4 INJECTION INTRAMUSCULAR; INTRAVENOUS EVERY 6 HOURS PRN
Status: DISCONTINUED | OUTPATIENT
Start: 2021-10-14 | End: 2021-10-21 | Stop reason: HOSPADM

## 2021-10-14 RX ORDER — OXYCODONE HYDROCHLORIDE 5 MG/1
10 TABLET ORAL EVERY 6 HOURS PRN
Status: DISCONTINUED | OUTPATIENT
Start: 2021-10-14 | End: 2021-10-18

## 2021-10-14 RX ORDER — NITROGLYCERIN 0.4 MG/1
0.4 TABLET SUBLINGUAL
Status: DISCONTINUED | OUTPATIENT
Start: 2021-10-14 | End: 2021-10-21 | Stop reason: HOSPADM

## 2021-10-14 RX ORDER — CLOPIDOGREL BISULFATE 75 MG/1
75 TABLET ORAL EVERY MORNING
Status: DISCONTINUED | OUTPATIENT
Start: 2021-10-15 | End: 2021-10-21 | Stop reason: HOSPADM

## 2021-10-14 RX ORDER — LOSARTAN POTASSIUM 50 MG/1
50 TABLET ORAL EVERY MORNING
Status: DISCONTINUED | OUTPATIENT
Start: 2021-10-15 | End: 2021-10-20

## 2021-10-14 RX ORDER — IPRATROPIUM BROMIDE AND ALBUTEROL SULFATE 2.5; .5 MG/3ML; MG/3ML
3 SOLUTION RESPIRATORY (INHALATION) EVERY 4 HOURS PRN
Status: DISCONTINUED | OUTPATIENT
Start: 2021-10-14 | End: 2021-10-21 | Stop reason: HOSPADM

## 2021-10-14 RX ORDER — MIRTAZAPINE 15 MG/1
30 TABLET, FILM COATED ORAL NIGHTLY
Status: DISCONTINUED | OUTPATIENT
Start: 2021-10-14 | End: 2021-10-19

## 2021-10-14 RX ORDER — ASPIRIN 81 MG/1
81 TABLET ORAL DAILY
Status: DISCONTINUED | OUTPATIENT
Start: 2021-10-15 | End: 2021-10-21 | Stop reason: HOSPADM

## 2021-10-14 RX ORDER — PREGABALIN 75 MG/1
150 CAPSULE ORAL 2 TIMES DAILY
Status: DISCONTINUED | OUTPATIENT
Start: 2021-10-14 | End: 2021-10-21 | Stop reason: HOSPADM

## 2021-10-14 RX ORDER — SODIUM CHLORIDE 0.9 % (FLUSH) 0.9 %
10 SYRINGE (ML) INJECTION AS NEEDED
Status: DISCONTINUED | OUTPATIENT
Start: 2021-10-14 | End: 2021-10-21 | Stop reason: HOSPADM

## 2021-10-14 RX ORDER — NITROGLYCERIN 0.4 MG/1
0.4 TABLET SUBLINGUAL
Status: DISCONTINUED | OUTPATIENT
Start: 2021-10-14 | End: 2021-10-14 | Stop reason: SDUPTHER

## 2021-10-14 RX ORDER — ROPINIROLE 0.25 MG/1
0.5 TABLET, FILM COATED ORAL NIGHTLY
Status: DISCONTINUED | OUTPATIENT
Start: 2021-10-14 | End: 2021-10-21 | Stop reason: HOSPADM

## 2021-10-14 RX ORDER — FAMOTIDINE 20 MG/1
40 TABLET, FILM COATED ORAL DAILY
Status: DISCONTINUED | OUTPATIENT
Start: 2021-10-15 | End: 2021-10-18

## 2021-10-14 RX ORDER — ATORVASTATIN CALCIUM 40 MG/1
40 TABLET, FILM COATED ORAL EVERY MORNING
Status: DISCONTINUED | OUTPATIENT
Start: 2021-10-15 | End: 2021-10-21 | Stop reason: HOSPADM

## 2021-10-14 RX ORDER — FUROSEMIDE 20 MG/1
20 TABLET ORAL EVERY MORNING
Status: DISCONTINUED | OUTPATIENT
Start: 2021-10-15 | End: 2021-10-21

## 2021-10-14 RX ORDER — SODIUM CHLORIDE 0.9 % (FLUSH) 0.9 %
3-10 SYRINGE (ML) INJECTION AS NEEDED
Status: DISCONTINUED | OUTPATIENT
Start: 2021-10-14 | End: 2021-10-21 | Stop reason: HOSPADM

## 2021-10-14 RX ORDER — ACETAMINOPHEN 325 MG/1
650 TABLET ORAL EVERY 4 HOURS PRN
Status: DISCONTINUED | OUTPATIENT
Start: 2021-10-14 | End: 2021-10-21 | Stop reason: HOSPADM

## 2021-10-14 RX ORDER — SODIUM CHLORIDE 0.9 % (FLUSH) 0.9 %
3 SYRINGE (ML) INJECTION EVERY 12 HOURS SCHEDULED
Status: DISCONTINUED | OUTPATIENT
Start: 2021-10-14 | End: 2021-10-21 | Stop reason: HOSPADM

## 2021-10-14 RX ORDER — BUDESONIDE 0.5 MG/2ML
0.5 INHALANT ORAL
Status: DISCONTINUED | OUTPATIENT
Start: 2021-10-14 | End: 2021-10-21 | Stop reason: HOSPADM

## 2021-10-14 RX ADMIN — PREGABALIN 150 MG: 75 CAPSULE ORAL at 22:02

## 2021-10-14 RX ADMIN — Medication 3 ML: at 22:03

## 2021-10-14 RX ADMIN — NITROGLYCERIN 0.4 MG: 0.4 TABLET SUBLINGUAL at 23:16

## 2021-10-14 RX ADMIN — IPRATROPIUM BROMIDE AND ALBUTEROL SULFATE 3 ML: 2.5; .5 SOLUTION RESPIRATORY (INHALATION) at 20:04

## 2021-10-14 RX ADMIN — ROPINIROLE HYDROCHLORIDE 0.5 MG: 0.25 TABLET, FILM COATED ORAL at 22:02

## 2021-10-14 RX ADMIN — APIXABAN 5 MG: 5 TABLET, FILM COATED ORAL at 22:02

## 2021-10-14 RX ADMIN — TRAZODONE HYDROCHLORIDE 100 MG: 100 TABLET ORAL at 22:02

## 2021-10-14 RX ADMIN — OXYCODONE 10 MG: 5 TABLET ORAL at 22:02

## 2021-10-14 RX ADMIN — MIRTAZAPINE 30 MG: 15 TABLET, FILM COATED ORAL at 22:02

## 2021-10-14 NOTE — TELEPHONE ENCOUNTER
Diallo with [facility] states patient's CP has increased since last week. Has started taking nitro BID.   Had active CP while he was with pt doing wound care. BP was 184/106  PT has estela/echo sched 10/19, advised go ahead and take to ER for eval.   Understood

## 2021-10-14 NOTE — ED PROVIDER NOTES
Subjective   76-year-old morbidly obese chronically ill  male presents to the emergency room with complaint of intermittent complaints of chest pain over the past 2 weeks.  Patient states that he has had to take 1-2 nitros after each episode of chest pain which is helped and relieve some of his pain.  Patient states that exertion or walking has triggered every chest pain episode.  Patient states that he is most recent cardiac stent placement was 2 to 3 years ago.  Patient states that his cardiologist is Dr. Swanson and he saw him about 10 days ago and he talked to him about his chest pain and Dr. Swanson has scheduled for him to have a stress test to be done on this coming Monday.  And also reports a persistent cough.  Patient denies fever, nausea, vomiting, abdominal pain.  Patient denies hematochezia, melena, hematuria.    Onset: 2 weeks ago  Location: Central chest pain  Duration: 4-5 episodes per day  Character: Sharp pain with shortness of breath  Aggravating/Alleviating Factors: Exertion/rest  Radiation: Down both arms  Severity: Moderate to severe            Review of Systems   Constitutional: Positive for activity change and fatigue. Negative for chills, diaphoresis and fever.   HENT: Negative.    Eyes: Negative.    Respiratory: Positive for cough, chest tightness and shortness of breath.    Cardiovascular: Positive for chest pain and leg swelling.   Gastrointestinal: Negative for abdominal pain, blood in stool, constipation, diarrhea, nausea and vomiting.   Endocrine: Negative.    Genitourinary: Negative for dysuria.   Musculoskeletal: Positive for back pain.   Skin: Positive for pallor and wound. Negative for rash.   Allergic/Immunologic: Negative.    Neurological: Positive for weakness. Negative for dizziness, seizures, syncope, light-headedness and headaches.   Hematological: Negative.    Psychiatric/Behavioral: Negative.        Past Medical History:   Diagnosis Date   • Alcoholic cirrhosis of liver  with ascites ([facility]) 10/26/2020   • Benign hypertension with CKD (chronic kidney disease) stage III ([facility]) 10/26/2020   • Bruises easily    • CHF (congestive heart failure) ([facility])    • Chronic bronchitis with COPD (chronic obstructive pulmonary disease) ([facility]) 10/26/2020   • Chronic respiratory failure with hypoxia ([facility]) 10/26/2020   • Congenital heart defect    • Difficulty attaining erection    • Hypertension    • Low back pain    • Pacemaker    • Peripheral vascular disease due to secondary diabetes ([facility]) 10/26/2020   • Poor circulation    • Prostate cancer ([facility])     prostate   • Shortness of breath    • Sleep apnea     using CPAP    • Stage 3a chronic kidney disease ([facility]) 10/26/2020   • Stented coronary artery 12/05/2019    MICHAEL to LAD   • Type 2 diabetes, controlled, with neuropathy ([facility]) 10/26/2020    no meds   • Type 2 diabetes, controlled, with neuropathy ([facility]) 10/26/2020       Allergies   Allergen Reactions   • Haloperidol Hives   • Morphine Itching   • Pantoprazole Other (See Comments)     Feels sick after receiving   • Latex Unknown - High Severity       Past Surgical History:   Procedure Laterality Date   • ABDOMINAL SURGERY     • APPENDECTOMY     • BONE CURETTAGE Right 1/22/2021    Procedure: Wound excision with fifth metatarsal head resection, right foot.;  Surgeon: Josef Egan DPM;  Location: [facility] OR;  Service: Podiatry;  Laterality: Right;   • BONE INCISION AND DRAINAGE Right 10/5/2020    Procedure: Incision and drainage with debridement of all nonviable soft tissue and bone with bone biopsy, right foot.;  Surgeon: Josef Egan DPM;  Location: [facility] MAIN OR;  Service: Podiatry;  Laterality: Right;   • CARDIAC CATHETERIZATION N/A 12/5/2019    Procedure: Left Heart Cath;  Surgeon: Mirza Munson MD;  Location: [facility] CATH INVASIVE LOCATION;  Service: Cardiology   • CARDIAC CATHETERIZATION N/A 12/5/2019    Procedure: Stent MICHAEL coronary;  Surgeon: Mirza Munson MD;   Location: [facility] CATH INVASIVE LOCATION;  Service: Cardiology   • CHOLECYSTECTOMY     • ELBOW PROCEDURE      left   • FOOT SURGERY      right foot   • HERNIA REPAIR     • INCISION AND DRAINAGE OF WOUND Right 4/6/2021    Procedure: INCISION AND DRAINAGE OF RIGHT FOOT 5TH METATARSAL TO BONE AND PARTIAL 5TH METATARSAL RESECTION;  Surgeon: Josef Egan DPM;  Location: [facility] MAIN OR;  Service: Podiatry;  Laterality: Right;   • INCISION AND DRAINAGE OF WOUND Right 4/8/2021    Procedure: INCISION AND DRAINAGE BONE, DELAYED PRIMARY CLOSURE;  Surgeon: Josef Egan DPM;  Location: [facility] MAIN OR;  Service: Podiatry;  Laterality: Right;   • INTERVENTIONAL RADIOLOGY PROCEDURE N/A 12/5/2019    Procedure: Intravascular Ultrasound;  Surgeon: Mirza Munson MD;  Location: [facility] CATH INVASIVE LOCATION;  Service: Cardiology   • PACEMAKER IMPLANTATION     • NC RT/LT HEART CATHETERS N/A 12/5/2019    Procedure: Percutaneous Coronary Intervention;  Surgeon: Mirza Munson MD;  Location: [facility] INVASIVE LOCATION;  Service: Cardiology   • WOUND DEBRIDEMENT Right 10/29/2020    Procedure: Preparation and debridement of foot wound with application of Integra wound graft, right foot.;  Surgeon: Josef Egan DPM;  Location: [facility] MAIN OR;  Service: Podiatry;  Laterality: Right;   • WOUND DEBRIDEMENT N/A 7/29/2021    Procedure: EXCISIONAL ABDOMINAL WOUND  DEBRIDEMENT;  Surgeon: Cleopatra Wang MD;  Location: [facility] MAIN OR;  Service: General;  Laterality: N/A;   • WOUND DEBRIDEMENT N/A 8/1/2021    Procedure: DEBRIDEMENT OF ABDOMINAL WALL;  Surgeon: Hill Bhatia DO;  Location: [facility] MAIN OR;  Service: General;  Laterality: N/A;       Family History   Problem Relation Age of Onset   • Alzheimer's disease Mother    • GI problems Father    • Heart disease Father        Social History     Socioeconomic History   • Marital status:    Tobacco Use   • Smoking status: Former Smoker     Years:  15.00     Types: Cigarettes     Start date: 1966     Quit date: 5/10/2020     Years since quittin.4   • Smokeless tobacco: Never Used   Vaping Use   • Vaping Use: Never used   Substance and Sexual Activity   • Alcohol use: Yes     Alcohol/week: 4.0 standard drinks     Types: 4 Shots of liquor per week     Comment: maybe 4 shots per month   • Drug use: No   • Sexual activity: Defer           Objective   Physical Exam  Constitutional:       General: He is not in acute distress.     Appearance: He is ill-appearing. He is not toxic-appearing or diaphoretic.   HENT:      Head: Normocephalic and atraumatic.   Eyes:      Extraocular Movements: Extraocular movements intact.      Pupils: Pupils are equal, round, and reactive to light.   Cardiovascular:      Pulses:           Radial pulses are 1+ on the right side and 1+ on the left side.        Dorsalis pedis pulses are 1+ on the right side and 1+ on the left side.      Heart sounds: Normal heart sounds.   Pulmonary:      Effort: Tachypnea present.      Breath sounds: Examination of the right-middle field reveals decreased breath sounds. Examination of the left-middle field reveals decreased breath sounds. Examination of the right-lower field reveals decreased breath sounds and wheezing. Examination of the left-lower field reveals decreased breath sounds and wheezing. Decreased breath sounds, wheezing, rhonchi and rales present.   Abdominal:      Tenderness: There is no abdominal tenderness. There is no guarding or rebound.   Musculoskeletal:         General: Normal range of motion.      Right lower leg: Edema present.      Left lower leg: Edema present.   Skin:     General: Skin is warm.      Capillary Refill: Capillary refill takes 2 to 3 seconds.      Coloration: Skin is pale.   Neurological:      General: No focal deficit present.      Mental Status: He is alert and oriented to person, place, and time.   Psychiatric:         Mood and Affect: Mood normal.          Behavior: Behavior normal.         Procedures           ED Course      Labs Reviewed   COMPREHENSIVE METABOLIC PANEL - Abnormal; Notable for the following components:       Result Value    Glucose 143 (*)     Sodium 135 (*)     Alkaline Phosphatase 166 (*)     All other components within normal limits    Narrative:     GFR Normal >60  Chronic Kidney Disease <60  Kidney Failure <15     CBC WITH AUTO DIFFERENTIAL - Abnormal; Notable for the following components:    Hemoglobin 12.1 (*)     Hematocrit 35.1 (*)     RDW 18.7 (*)     RDW-SD 54.7 (*)     Lymphocyte % 17.5 (*)     All other components within normal limits   POCT GLUCOSE FINGERSTICK - Abnormal; Notable for the following components:    Glucose 164 (*)     All other components within normal limits   COVID-19,CEPHEID/BRITTANI/BDMAX,COR/SMOOTH/PAD/SARAI IN-HOUSE,NP SWAB IN TRANSPORT MEDIA 3-4 HR TAT, RT-PCR - Normal    Narrative:     Fact sheet for providers: https://www.fda.gov/media/356048/download     Fact sheet for patients: https://www.fda.gov/media/873034/download  Fact sheet for providers: https://www.fda.gov/media/325615/download    Fact sheet for patients: https://www.fda.gov/media/511044/download    Test performed by PCR.   BNP (IN-HOUSE) - Normal    Narrative:     Among patients with dyspnea, NT-proBNP is highly sensitive for the detection of acute congestive heart failure. In addition NT-proBNP of <300 pg/ml effectively rules out acute congestive heart failure with 99% negative predictive value.    Results may be falsely decreased if patient taking Biotin.     TROPONIN (IN-HOUSE) - Normal    Narrative:     Troponin T Reference Range:  <= 0.03 ng/mL-   Negative for AMI  >0.03 ng/mL-     Abnormal for myocardial necrosis.  Clinicians would have to utilize clinical acumen, EKG, Troponin and serial changes to determine if it is an Acute Myocardial Infarction or myocardial injury due to an underlying chronic condition.       Results may be falsely decreased if  patient taking Biotin.     COVID PRE-OP / PRE-PROCEDURE SCREENING ORDER (NO ISOLATION)    Narrative:     The following orders were created for panel order COVID PRE-OP / PRE-PROCEDURE SCREENING ORDER (NO ISOLATION) - Swab, Nasopharynx.  Procedure                               Abnormality         Status                     ---------                               -----------         ------                     COVID-19,CEPHEID/BRITTANI/BD...[[id number]]  Normal              Final result                 Please view results for these tests on the individual orders.   CBC AND DIFFERENTIAL    Narrative:     The following orders were created for panel order CBC & Differential.  Procedure                               Abnormality         Status                     ---------                               -----------         ------                     CBC Auto Differential[[id number]]        Abnormal            Final result                 Please view results for these tests on the individual orders.       Medications   sodium chloride 0.9 % flush 10 mL (has no administration in time range)   apixaban (ELIQUIS) tablet 5 mg (has no administration in time range)   aspirin EC tablet 81 mg (has no administration in time range)   atorvastatin (LIPITOR) tablet 40 mg (has no administration in time range)   budesonide (PULMICORT) nebulizer solution 0.5 mg (0.5 mg Nebulization Not Given 10/14/21 2108)   clopidogrel (PLAVIX) tablet 75 mg (has no administration in time range)   furosemide (LASIX) tablet 20 mg (has no administration in time range)   ipratropium-albuterol (DUO-NEB) nebulizer solution 3 mL (has no administration in time range)   losartan (COZAAR) tablet 50 mg (has no administration in time range)   mirtazapine (REMERON) tablet 30 mg (has no administration in time range)   nitroglycerin (NITROSTAT) SL tablet 0.4 mg (has no administration in time range)   oxyCODONE (ROXICODONE) immediate release tablet 10 mg (has no administration  in time range)   potassium chloride (K-DUR,KLOR-CON) ER tablet 10 mEq (has no administration in time range)   pregabalin (LYRICA) capsule 150 mg (has no administration in time range)   rOPINIRole (REQUIP) tablet 0.5 mg (has no administration in time range)   traZODone (DESYREL) tablet 100 mg (has no administration in time range)   sodium chloride 0.9 % flush 3 mL (has no administration in time range)   sodium chloride 0.9 % flush 3-10 mL (has no administration in time range)   nitroglycerin (NITROSTAT) SL tablet 0.4 mg (has no administration in time range)   acetaminophen (TYLENOL) tablet 650 mg (has no administration in time range)   ondansetron (ZOFRAN) injection 4 mg (has no administration in time range)   famotidine (PEPCID) tablet 40 mg (has no administration in time range)   influenza vac split quad (FLUZONE,FLUARIX,AFLURIA,FLULAVAL) injection 0.5 mL (has no administration in time range)   ipratropium-albuterol (DUO-NEB) nebulizer solution 3 mL (3 mL Nebulization Given 10/14/21 2004)     XR Chest 2 View    Result Date: 10/14/2021   1. Borderline cardiomegaly. 2. No active pulmonary disease.  Electronically Signed By-Yusuf Gray MD On:10/14/2021 4:33 PM This report was finalized on [id number] by  Yusuf Gray MD.                                       [facility]    Final diagnoses:   Angina of effort (HCC)       ED Disposition  ED Disposition     ED Disposition Condition Comment    Decision to Admit  Level of Care: Med/Surg [1]   Diagnosis: Angina of effort (HCC) [579321]   Admitting Physician: ERNESTO WINTER [[id number]]   Attending Physician: ERNESTO WINTER [[id number]]            No follow-up provider specified.       Medication List      ASK your doctor about these medications    * oxyCODONE-acetaminophen  MG per tablet  Commonly known as: Percocet  Take 1 tablet by mouth Every 6 (Six) Hours As Needed for Moderate Pain .  Ask about: Which instructions should I use?     * oxyCODONE-acetaminophen  MG per  tablet  Commonly known as: Percocet  Take 1 tablet by mouth Every 6 (Six) Hours As Needed for Moderate Pain .  Ask about: Which instructions should I use?         * This list has 2 medication(s) that are the same as other medications prescribed for you. Read the directions carefully, and ask your doctor or other care provider to review them with you.                 Gina Reynolds, APRN  10/14/21 5469

## 2021-10-14 NOTE — ED NOTES
Patient requested recliner. Recliner placed in room. Call light within reach.     Vasyl Harp, RN  10/14/21 5638

## 2021-10-15 ENCOUNTER — APPOINTMENT (OUTPATIENT)
Dept: NUCLEAR MEDICINE | Facility: HOSPITAL | Age: 76
End: 2021-10-15

## 2021-10-15 ENCOUNTER — APPOINTMENT (OUTPATIENT)
Dept: CARDIOLOGY | Facility: HOSPITAL | Age: 76
End: 2021-10-15

## 2021-10-15 LAB
ANION GAP SERPL CALCULATED.3IONS-SCNC: 13 MMOL/L (ref 5–15)
BASOPHILS # BLD AUTO: 0.1 10*3/MM3 (ref 0–0.2)
BASOPHILS NFR BLD AUTO: 1.2 % (ref 0–1.5)
BH CV ECHO MEAS - ACS: 2.3 CM
BH CV ECHO MEAS - AO MAX PG (FULL): 4.2 MMHG
BH CV ECHO MEAS - AO MAX PG: 7.2 MMHG
BH CV ECHO MEAS - AO MEAN PG (FULL): 2.5 MMHG
BH CV ECHO MEAS - AO MEAN PG: 3.9 MMHG
BH CV ECHO MEAS - AO ROOT AREA (BSA CORRECTED): 1.7
BH CV ECHO MEAS - AO ROOT AREA: 13.1 CM^2
BH CV ECHO MEAS - AO ROOT DIAM: 4.1 CM
BH CV ECHO MEAS - AO V2 MAX: 134.1 CM/SEC
BH CV ECHO MEAS - AO V2 MEAN: 93.3 CM/SEC
BH CV ECHO MEAS - AO V2 VTI: 27.7 CM
BH CV ECHO MEAS - AORTIC HR: 69.1 BPM
BH CV ECHO MEAS - AORTIC R-R: 0.87 SEC
BH CV ECHO MEAS - AVA(I,A): 1.8 CM^2
BH CV ECHO MEAS - AVA(I,D): 1.8 CM^2
BH CV ECHO MEAS - AVA(V,A): 1.7 CM^2
BH CV ECHO MEAS - AVA(V,D): 1.7 CM^2
BH CV ECHO MEAS - BSA(HAYCOCK): 2.6 M^2
BH CV ECHO MEAS - BSA: 2.5 M^2
BH CV ECHO MEAS - BZI_BMI: 37.7 KILOGRAMS/M^2
BH CV ECHO MEAS - BZI_METRIC_HEIGHT: 182.9 CM
BH CV ECHO MEAS - BZI_METRIC_WEIGHT: 126.1 KG
BH CV ECHO MEAS - CI(AO): 10.3 L/MIN/M^2
BH CV ECHO MEAS - CI(LVOT): 1.4 L/MIN/M^2
BH CV ECHO MEAS - CO(AO): 25.2 L/MIN
BH CV ECHO MEAS - CO(LVOT): 3.4 L/MIN
BH CV ECHO MEAS - EDV(CUBED): 95.2 ML
BH CV ECHO MEAS - EDV(TEICH): 95.7 ML
BH CV ECHO MEAS - EF(CUBED): 54.9 %
BH CV ECHO MEAS - EF(MOD-SP4): 47 %
BH CV ECHO MEAS - EF(TEICH): 46.8 %
BH CV ECHO MEAS - ESV(CUBED): 42.9 ML
BH CV ECHO MEAS - ESV(TEICH): 50.9 ML
BH CV ECHO MEAS - FS: 23.3 %
BH CV ECHO MEAS - IVS/LVPW: 1.2
BH CV ECHO MEAS - IVSD: 1.7 CM
BH CV ECHO MEAS - LA DIMENSION(2D): 5.1 CM
BH CV ECHO MEAS - LV MASS(C)D: 299.2 GRAMS
BH CV ECHO MEAS - LV MASS(C)DI: 122.1 GRAMS/M^2
BH CV ECHO MEAS - LV MAX PG: 3 MMHG
BH CV ECHO MEAS - LV MEAN PG: 1.3 MMHG
BH CV ECHO MEAS - LV V1 MAX: 86.9 CM/SEC
BH CV ECHO MEAS - LV V1 MEAN: 52.3 CM/SEC
BH CV ECHO MEAS - LV V1 VTI: 19.1 CM
BH CV ECHO MEAS - LVIDD: 4.6 CM
BH CV ECHO MEAS - LVIDS: 3.5 CM
BH CV ECHO MEAS - LVOT AREA: 2.6 CM^2
BH CV ECHO MEAS - LVOT DIAM: 1.8 CM
BH CV ECHO MEAS - LVPWD: 1.4 CM
BH CV ECHO MEAS - MR MAX PG: 123.9 MMHG
BH CV ECHO MEAS - MR MAX VEL: 556.7 CM/SEC
BH CV ECHO MEAS - MV E MAX VEL: 124 CM/SEC
BH CV ECHO MEAS - MV MAX PG: 10.2 MMHG
BH CV ECHO MEAS - MV MEAN PG: 3.3 MMHG
BH CV ECHO MEAS - MV V2 MAX: 159.7 CM/SEC
BH CV ECHO MEAS - MV V2 MEAN: 77.4 CM/SEC
BH CV ECHO MEAS - MV V2 VTI: 34.2 CM
BH CV ECHO MEAS - MVA(VTI): 1.4 CM^2
BH CV ECHO MEAS - PA MAX PG (FULL): 0.92 MMHG
BH CV ECHO MEAS - PA MAX PG: 4.4 MMHG
BH CV ECHO MEAS - PA V2 MAX: 105.2 CM/SEC
BH CV ECHO MEAS - PVA(V,A): 3.3 CM^2
BH CV ECHO MEAS - PVA(V,D): 3.3 CM^2
BH CV ECHO MEAS - QP/QS: 1.4
BH CV ECHO MEAS - RAP SYSTOLE: 3 MMHG
BH CV ECHO MEAS - RV MAX PG: 3.5 MMHG
BH CV ECHO MEAS - RV MEAN PG: 1.8 MMHG
BH CV ECHO MEAS - RV V1 MAX: 93.8 CM/SEC
BH CV ECHO MEAS - RV V1 MEAN: 62.4 CM/SEC
BH CV ECHO MEAS - RV V1 VTI: 18.8 CM
BH CV ECHO MEAS - RVDD: 3.4 CM
BH CV ECHO MEAS - RVOT AREA: 3.7 CM^2
BH CV ECHO MEAS - RVOT DIAM: 2.2 CM
BH CV ECHO MEAS - RVSP: 24.9 MMHG
BH CV ECHO MEAS - SI(AO): 148.6 ML/M^2
BH CV ECHO MEAS - SI(CUBED): 21.3 ML/M^2
BH CV ECHO MEAS - SI(LVOT): 20 ML/M^2
BH CV ECHO MEAS - SI(TEICH): 18.3 ML/M^2
BH CV ECHO MEAS - SV(AO): 364.2 ML
BH CV ECHO MEAS - SV(CUBED): 52.3 ML
BH CV ECHO MEAS - SV(LVOT): 49 ML
BH CV ECHO MEAS - SV(RVOT): 69.8 ML
BH CV ECHO MEAS - SV(TEICH): 44.8 ML
BH CV ECHO MEAS - TR MAX VEL: 224.6 CM/SEC
BH CV REST NUCLEAR ISOTOPE DOSE: 7.3 MCI
BH CV STRESS BP STAGE 1: NORMAL
BH CV STRESS BP STAGE 2: NORMAL
BH CV STRESS COMMENTS STAGE 1: NORMAL
BH CV STRESS COMMENTS STAGE 2: NORMAL
BH CV STRESS DOSE REGADENOSON STAGE 1: 0.4
BH CV STRESS DURATION MIN STAGE 1: 0
BH CV STRESS DURATION MIN STAGE 2: 4
BH CV STRESS DURATION SEC STAGE 1: 10
BH CV STRESS DURATION SEC STAGE 2: 0
BH CV STRESS HR STAGE 1: 90
BH CV STRESS HR STAGE 2: 130
BH CV STRESS NUCLEAR ISOTOPE DOSE: 21.9 MCI
BH CV STRESS PROTOCOL 1: NORMAL
BH CV STRESS RECOVERY BP: NORMAL MMHG
BH CV STRESS RECOVERY HR: 110 BPM
BH CV STRESS STAGE 1: 1
BH CV STRESS STAGE 2: 2
BUN SERPL-MCNC: 19 MG/DL (ref 8–23)
BUN/CREAT SERPL: 17.4 (ref 7–25)
CALCIUM SPEC-SCNC: 9.1 MG/DL (ref 8.6–10.5)
CHLORIDE SERPL-SCNC: 100 MMOL/L (ref 98–107)
CO2 SERPL-SCNC: 25 MMOL/L (ref 22–29)
CREAT SERPL-MCNC: 1.09 MG/DL (ref 0.76–1.27)
DEPRECATED RDW RBC AUTO: 52.1 FL (ref 37–54)
EOSINOPHIL # BLD AUTO: 0.4 10*3/MM3 (ref 0–0.4)
EOSINOPHIL NFR BLD AUTO: 4.7 % (ref 0.3–6.2)
ERYTHROCYTE [DISTWIDTH] IN BLOOD BY AUTOMATED COUNT: 18.1 % (ref 12.3–15.4)
GFR SERPL CREATININE-BSD FRML MDRD: 66 ML/MIN/1.73
GLUCOSE SERPL-MCNC: 138 MG/DL (ref 65–99)
HCT VFR BLD AUTO: 35.2 % (ref 37.5–51)
HGB BLD-MCNC: 12.2 G/DL (ref 13–17.7)
LV EF 2D ECHO EST: 55 %
LYMPHOCYTES # BLD AUTO: 1.7 10*3/MM3 (ref 0.7–3.1)
LYMPHOCYTES NFR BLD AUTO: 21.2 % (ref 19.6–45.3)
MAGNESIUM SERPL-MCNC: 1.9 MG/DL (ref 1.6–2.4)
MAXIMAL PREDICTED HEART RATE: 145 BPM
MCH RBC QN AUTO: 28.8 PG (ref 26.6–33)
MCHC RBC AUTO-ENTMCNC: 34.7 G/DL (ref 31.5–35.7)
MCV RBC AUTO: 82.9 FL (ref 79–97)
MONOCYTES # BLD AUTO: 0.8 10*3/MM3 (ref 0.1–0.9)
MONOCYTES NFR BLD AUTO: 9.6 % (ref 5–12)
NEUTROPHILS NFR BLD AUTO: 5 10*3/MM3 (ref 1.7–7)
NEUTROPHILS NFR BLD AUTO: 63.3 % (ref 42.7–76)
NRBC BLD AUTO-RTO: 0.2 /100 WBC (ref 0–0.2)
PERCENT MAX PREDICTED HR: 89.66 %
PLATELET # BLD AUTO: 310 10*3/MM3 (ref 140–450)
PMV BLD AUTO: 8.5 FL (ref 6–12)
POTASSIUM SERPL-SCNC: 4.3 MMOL/L (ref 3.5–5.2)
RBC # BLD AUTO: 4.24 10*6/MM3 (ref 4.14–5.8)
SODIUM SERPL-SCNC: 138 MMOL/L (ref 136–145)
STRESS BASELINE BP: NORMAL MMHG
STRESS BASELINE HR: 100 BPM
STRESS PERCENT HR: 105 %
STRESS POST PEAK BP: NORMAL MMHG
STRESS POST PEAK HR: 130 BPM
STRESS TARGET HR: 123 BPM
TROPONIN T SERPL-MCNC: <0.01 NG/ML (ref 0–0.03)
WBC # BLD AUTO: 7.9 10*3/MM3 (ref 3.4–10.8)

## 2021-10-15 PROCEDURE — 93005 ELECTROCARDIOGRAM TRACING: CPT | Performed by: INTERNAL MEDICINE

## 2021-10-15 PROCEDURE — 94799 UNLISTED PULMONARY SVC/PX: CPT

## 2021-10-15 PROCEDURE — 93016 CV STRESS TEST SUPVJ ONLY: CPT | Performed by: INTERNAL MEDICINE

## 2021-10-15 PROCEDURE — 93306 TTE W/DOPPLER COMPLETE: CPT

## 2021-10-15 PROCEDURE — 93306 TTE W/DOPPLER COMPLETE: CPT | Performed by: INTERNAL MEDICINE

## 2021-10-15 PROCEDURE — 78452 HT MUSCLE IMAGE SPECT MULT: CPT

## 2021-10-15 PROCEDURE — A9502 TC99M TETROFOSMIN: HCPCS | Performed by: FAMILY MEDICINE

## 2021-10-15 PROCEDURE — 99215 OFFICE O/P EST HI 40 MIN: CPT | Performed by: INTERNAL MEDICINE

## 2021-10-15 PROCEDURE — 25010000002 REGADENOSON 0.4 MG/5ML SOLUTION: Performed by: FAMILY MEDICINE

## 2021-10-15 PROCEDURE — G0378 HOSPITAL OBSERVATION PER HR: HCPCS

## 2021-10-15 PROCEDURE — 25010000002 HYDROMORPHONE PER 4 MG: Performed by: FAMILY MEDICINE

## 2021-10-15 PROCEDURE — 80048 BASIC METABOLIC PNL TOTAL CA: CPT | Performed by: INTERNAL MEDICINE

## 2021-10-15 PROCEDURE — 78452 HT MUSCLE IMAGE SPECT MULT: CPT | Performed by: INTERNAL MEDICINE

## 2021-10-15 PROCEDURE — 97161 PT EVAL LOW COMPLEX 20 MIN: CPT

## 2021-10-15 PROCEDURE — 93017 CV STRESS TEST TRACING ONLY: CPT

## 2021-10-15 PROCEDURE — 83735 ASSAY OF MAGNESIUM: CPT | Performed by: INTERNAL MEDICINE

## 2021-10-15 PROCEDURE — 0 TECHNETIUM TETROFOSMIN KIT: Performed by: FAMILY MEDICINE

## 2021-10-15 PROCEDURE — 93018 CV STRESS TEST I&R ONLY: CPT | Performed by: INTERNAL MEDICINE

## 2021-10-15 PROCEDURE — 84484 ASSAY OF TROPONIN QUANT: CPT | Performed by: INTERNAL MEDICINE

## 2021-10-15 PROCEDURE — 93010 ELECTROCARDIOGRAM REPORT: CPT | Performed by: INTERNAL MEDICINE

## 2021-10-15 PROCEDURE — 85025 COMPLETE CBC W/AUTO DIFF WBC: CPT | Performed by: INTERNAL MEDICINE

## 2021-10-15 RX ORDER — HYDROMORPHONE HCL 110MG/55ML
0.5 PATIENT CONTROLLED ANALGESIA SYRINGE INTRAVENOUS
Status: DISCONTINUED | OUTPATIENT
Start: 2021-10-15 | End: 2021-10-19

## 2021-10-15 RX ADMIN — ROPINIROLE HYDROCHLORIDE 0.5 MG: 0.25 TABLET, FILM COATED ORAL at 20:32

## 2021-10-15 RX ADMIN — PREGABALIN 150 MG: 75 CAPSULE ORAL at 10:50

## 2021-10-15 RX ADMIN — CLOPIDOGREL BISULFATE 75 MG: 75 TABLET ORAL at 10:50

## 2021-10-15 RX ADMIN — LOSARTAN POTASSIUM 50 MG: 50 TABLET, FILM COATED ORAL at 10:50

## 2021-10-15 RX ADMIN — POTASSIUM CHLORIDE 10 MEQ: 750 TABLET, EXTENDED RELEASE ORAL at 10:50

## 2021-10-15 RX ADMIN — PREGABALIN 150 MG: 75 CAPSULE ORAL at 20:32

## 2021-10-15 RX ADMIN — BUDESONIDE 0.5 MG: 0.5 SUSPENSION RESPIRATORY (INHALATION) at 07:22

## 2021-10-15 RX ADMIN — ATORVASTATIN CALCIUM 40 MG: 40 TABLET, FILM COATED ORAL at 10:54

## 2021-10-15 RX ADMIN — IPRATROPIUM BROMIDE AND ALBUTEROL SULFATE 3 ML: 2.5; .5 SOLUTION RESPIRATORY (INHALATION) at 20:04

## 2021-10-15 RX ADMIN — REGADENOSON 0.4 MG: 0.08 INJECTION, SOLUTION INTRAVENOUS at 08:35

## 2021-10-15 RX ADMIN — TETROFOSMIN 1 DOSE: 1.38 INJECTION, POWDER, LYOPHILIZED, FOR SOLUTION INTRAVENOUS at 06:45

## 2021-10-15 RX ADMIN — BUDESONIDE 0.5 MG: 0.5 SUSPENSION RESPIRATORY (INHALATION) at 20:04

## 2021-10-15 RX ADMIN — FAMOTIDINE 40 MG: 20 TABLET ORAL at 10:50

## 2021-10-15 RX ADMIN — POTASSIUM CHLORIDE 10 MEQ: 750 TABLET, EXTENDED RELEASE ORAL at 17:42

## 2021-10-15 RX ADMIN — OXYCODONE 10 MG: 5 TABLET ORAL at 03:56

## 2021-10-15 RX ADMIN — MIRTAZAPINE 30 MG: 15 TABLET, FILM COATED ORAL at 20:32

## 2021-10-15 RX ADMIN — HYDROMORPHONE HYDROCHLORIDE 0.5 MG: 2 INJECTION INTRAMUSCULAR; INTRAVENOUS; SUBCUTANEOUS at 20:33

## 2021-10-15 RX ADMIN — TRAZODONE HYDROCHLORIDE 100 MG: 100 TABLET ORAL at 20:32

## 2021-10-15 RX ADMIN — Medication 3 ML: at 20:33

## 2021-10-15 RX ADMIN — Medication 3 ML: at 10:54

## 2021-10-15 RX ADMIN — HYDROMORPHONE HYDROCHLORIDE 0.5 MG: 2 INJECTION INTRAMUSCULAR; INTRAVENOUS; SUBCUTANEOUS at 13:45

## 2021-10-15 RX ADMIN — ASPIRIN 81 MG: 81 TABLET, COATED ORAL at 10:50

## 2021-10-15 RX ADMIN — FUROSEMIDE 20 MG: 20 TABLET ORAL at 10:50

## 2021-10-15 RX ADMIN — HYDROMORPHONE HYDROCHLORIDE 0.5 MG: 2 INJECTION INTRAMUSCULAR; INTRAVENOUS; SUBCUTANEOUS at 17:42

## 2021-10-15 RX ADMIN — OXYCODONE 10 MG: 5 TABLET ORAL at 10:56

## 2021-10-15 RX ADMIN — TETROFOSMIN 1 DOSE: 1.38 INJECTION, POWDER, LYOPHILIZED, FOR SOLUTION INTRAVENOUS at 08:35

## 2021-10-15 NOTE — DISCHARGE PLACEMENT REQUEST
"Ro Sidhu (76 y.o. Male)             Date of Birth Social Security Number Address Home Phone MRN    1945  [address] [place] [address] [phone] [id number]    Yarsani Marital Status             Buddhism        Admission Date Admission Type Admitting Provider Attending Provider Department, Room/Bed    10/14/21 Emergency Jeane Sanders MD Heimer, Brian T, MD [facility] 3C MEDICAL INPATIENT, 367/1    Discharge Date Discharge Disposition Discharge Destination                         Attending Provider: Yusuf Jones MD    Allergies: Haloperidol, Morphine, Pantoprazole, Latex    Isolation: Contact   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)   Code Status: CPR   Advance Care Planning Activity    Ht: 182.9 cm (72\")   Wt: 126 kg (278 lb)    Admission Cmt: None   Principal Problem: None                Active Insurance as of 10/14/2021     Primary Coverage     Payor Plan Insurance Group Employer/Plan Group    HUMANA MEDICARE REPLACEMENT HU[employer]NA MEDICARE REPLACEMENT [id number]     Payor Plan Address Payor Plan Phone Number Payor Plan Fax Number Effective Dates    [address] [address] [phone]  1/1/2018 - None Entered    [facility] [id number]       Subscriber Name Subscriber Birth Date Member ID       RO SIDHU 1945 [id number]                 Emergency Contacts      (Rel.) Home Phone Work Phone Mobile Phone    LONI SIDHU (Spouse) -- -- [phone]        "

## 2021-10-15 NOTE — CASE MANAGEMENT/SOCIAL WORK
Discharge Planning Assessment   Claus     Patient Name: Ro Nathan  MRN: [id number]  Today's Date: 10/15/2021    Admit Date: 10/14/2021     Discharge Needs Assessment     Row Name 10/15/21 8825       Living Environment    Lives With spouse    Name(s) of Who Lives With Patient Meghan    Current Living Arrangements home/apartment/condo    Primary Care Provided by self    Provides Primary Care For no one    Family Caregiver if Needed spouse    Quality of Family Relationships helpful; involved; supportive    Able to Return to Prior Arrangements yes       Resource/Environmental Concerns    Resource/Environmental Concerns none    Transportation Concerns car, none       Transition Planning    Patient/Family Anticipates Transition to home; home with family    Patient/Family Anticipated Services at Transition none    Transportation Anticipated family or friend will provide       Discharge Needs Assessment    Readmission Within the Last 30 Days no previous admission in last 30 days    Current Outpatient/Agency/Support Group homecare agency    Equipment Currently Used at Home walker, rolling    Concerns to be Addressed denies needs/concerns at this time    Anticipated Changes Related to Illness none    Equipment Needed After Discharge none    Outpatient/Agency/Support Group Needs homecare agency    Discharge Facility/Level of Care Needs home with home health    Provided Post Acute Provider List? N/A    Provided Post Acute Provider Quality & Resource List? N/A               Discharge Plan     Row Name 10/15/21 141       Plan    Plan DC plan: anticipate routine discharge home, [facility]  (accepted, CAITLIN order placed).    Patient/Family in Agreement with Plan yes    Plan Comments Met with patient at bedside. States he lives at home with wife, Meghan. Doesn't drive. Has a rolling walker that he uses. Is current with [facility] home health, received notice from liaison. CAITLIN order placed. Confirmed PCP and pharmacy.              Continued  Care and Services - Admitted Since 10/14/2021     Home Medical Care Coordination complete.    Service Provider Request Status Selected Services Address Phone Fax Patient Preferred    [facility] [address] [place] [address] [phone] --               Demographic Summary     Row Name 10/15/21 1403       General Information    Admission Type observation    Arrived From emergency department    Referral Source admission list    Reason for Consult discharge planning; care coordination/care conference    Preferred Language English     Used During This Interaction no       Contact Information    Permission Granted to Share Info With                Functional Status     Row Name 10/15/21 1404       Functional Status    Usual Activity Tolerance moderate    Current Activity Tolerance moderate       Functional Status, IADL    Medications assistive person    Meal Preparation assistive person    Housekeeping assistive person    Laundry assistive person    Shopping assistive person              Met with patient in room wearing PPE: mask, goggles.      Maintained distance greater than six feet and spent less than 15 minutes in the room.      Megan Naegele, RN      Office Phone: [phone]  Office Cell: [phone]

## 2021-10-15 NOTE — NURSING NOTE
WOCN note:    76 yr old male admitted 10/14/21 with c/o chest pain. WOCN consult received for abdominal wound.     Patient presents with chronic open abdominal wound s/p hernia repair approx 10 yrs ago. Patient is current with [facility] and [facility].     Mid abdominal wound measures 4x10x <0.1cm with creamy beige drainage. Wound bed is red, non-granular.   Wound cleansed with NS and Opticell Ag applied with ABD to cover and foam tape to secure. Recommend to change every other day. Patient to follow up with outpatient wound center. Will follow as needed.

## 2021-10-15 NOTE — PLAN OF CARE
Goal Outcome Evaluation:  Plan of Care Reviewed With: patient        Progress: no change  Outcome Summary: One episode of chest pain overnight.  Stress test today.

## 2021-10-15 NOTE — H&P
"Patient Care Team:  Yusuf Jones MD as PCP - General  PhiJoshua MD as Consulting Physician (Cardiology)  Shirley, Moise Kathleen MD as Consulting Physician (Cardiac Electrophysiology)  Izzy Alvarez MD as Consulting Physician (Nephrology)  Celine Swanson MD as Consulting Physician (Cardiology)    Chief complaint chest pain    Subjective     77y/o White morbidly obese chronically ill  male presented to the emergency room yesterday with complaint of intermittent chest pain over the past 2 weeks.  Patient states that he has had to take 1-2 nitros after each episode of chest pain which is helped and relieve some of his pain.  Patient states that exertion or walking has triggered every chest pain episode.  Patient states that he is most recent cardiac stent placement was 2 to 3 years ago.  Patient states that his cardiologist is Dr. Swanson and he saw him about 10 days ago and he talked to him about his chest pain and Dr. Swanson has scheduled for him to have a stress test to be done on this coming Monday.  And also reports a persistent cough.  Patient denies fever, nausea, vomiting, abdominal pain.  Patient denies hematochezia, melena, hematuria.    Pt was admitted for further evaluation and scheduled for a stress test.  On my exam today, the stress test has already been done but the results are still pending.  Pt states that he is feeling \"ok\" except for some back pain    Review of Systems   Constitutional: Positive for activity change and fatigue. Negative for fever.   Respiratory: Positive for chest tightness and shortness of breath.    Cardiovascular: Positive for chest pain.   Musculoskeletal: Positive for arthralgias and back pain.   Neurological: Positive for weakness.   Psychiatric/Behavioral: Negative for confusion.          History  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) 10/26/2020   • Benign hypertension with CKD " (chronic kidney disease) stage III ([facility]) 10/26/2020   • Bruises easily    • CHF (congestive heart failure) ([facility])    • Chronic bronchitis with COPD (chronic obstructive pulmonary disease) ([facility]) 10/26/2020   • Chronic respiratory failure with hypoxia ([facility]) 10/26/2020   • Congenital heart defect    • Difficulty attaining erection    • Hypertension    • Low back pain    • Pacemaker    • Peripheral vascular disease due to secondary diabetes ([facility]) 10/26/2020   • Poor circulation    • Prostate cancer ([facility])     prostate   • Shortness of breath    • Sleep apnea     using CPAP    • Stage 3a chronic kidney disease ([facility]) 10/26/2020   • Stented coronary artery 12/05/2019    MICHAEL to LAD   • Type 2 diabetes, controlled, with neuropathy ([facility]) 10/26/2020    no meds   • Type 2 diabetes, controlled, with neuropathy ([facility]) 10/26/2020     Past Surgical History:   Procedure Laterality Date   • ABDOMINAL SURGERY     • APPENDECTOMY     • BONE CURETTAGE Right 1/22/2021    Procedure: Wound excision with fifth metatarsal head resection, right foot.;  Surgeon: Josef Egan DPM;  Location: [facility] MAIN OR;  Service: Podiatry;  Laterality: Right;   • BONE INCISION AND DRAINAGE Right 10/5/2020    Procedure: Incision and drainage with debridement of all nonviable soft tissue and bone with bone biopsy, right foot.;  Surgeon: Josef Egan DPM;  Location: [facility] MAIN OR;  Service: Podiatry;  Laterality: Right;   • CARDIAC CATHETERIZATION N/A 12/5/2019    Procedure: Left Heart Cath;  Surgeon: Mirza Munson MD;  Location: [facility] CATH INVASIVE LOCATION;  Service: Cardiology   • CARDIAC CATHETERIZATION N/A 12/5/2019    Procedure: Stent MICHAEL coronary;  Surgeon: Mirza Munson MD;  Location: [facility] CATH INVASIVE LOCATION;  Service: Cardiology   • CHOLECYSTECTOMY     • ELBOW PROCEDURE      left   • FOOT SURGERY      right foot   • HERNIA REPAIR     • INCISION AND DRAINAGE OF WOUND Right 4/6/2021    Procedure: INCISION AND  DRAINAGE OF RIGHT FOOT 5TH METATARSAL TO BONE AND PARTIAL 5TH METATARSAL RESECTION;  Surgeon: Josef Egan DPM;  Location: [facility] MAIN OR;  Service: Podiatry;  Laterality: Right;   • INCISION AND DRAINAGE OF WOUND Right 2021    Procedure: INCISION AND DRAINAGE BONE, DELAYED PRIMARY CLOSURE;  Surgeon: Josef Egan DPM;  Location: [facility] MAIN OR;  Service: Podiatry;  Laterality: Right;   • INTERVENTIONAL RADIOLOGY PROCEDURE N/A 2019    Procedure: Intravascular Ultrasound;  Surgeon: Mirza Munson MD;  Location: [facility] CATH INVASIVE LOCATION;  Service: Cardiology   • PACEMAKER IMPLANTATION     • [place] RT/LT HEART CATHETERS N/A 2019    Procedure: Percutaneous Coronary Intervention;  Surgeon: Mirza Munson MD;  Location: [facility] CATH INVASIVE LOCATION;  Service: Cardiology   • WOUND DEBRIDEMENT Right 10/29/2020    Procedure: Preparation and debridement of foot wound with application of Integra wound graft, right foot.;  Surgeon: Josef Egan DPM;  Location: [facility] MAIN OR;  Service: Podiatry;  Laterality: Right;   • WOUND DEBRIDEMENT N/A 2021    Procedure: EXCISIONAL ABDOMINAL WOUND  DEBRIDEMENT;  Surgeon: Cleopatra Wang MD;  Location: [facility] MAIN OR;  Service: General;  Laterality: N/A;   • WOUND DEBRIDEMENT N/A 2021    Procedure: DEBRIDEMENT OF ABDOMINAL WALL;  Surgeon: Hill Bhatia DO;  Location: [facility] MAIN OR;  Service: General;  Laterality: N/A;     Family History   Problem Relation Age of Onset   • Alzheimer's disease Mother    • GI problems Father    • Heart disease Father      Social History     Tobacco Use   • Smoking status: Former Smoker     Years: 15.00     Types: Cigarettes     Start date: 1966     Quit date: 5/10/2020     Years since quittin.4   • Smokeless tobacco: Never Used   Vaping Use   • Vaping Use: Never used   Substance Use Topics   • Alcohol use: Yes     Alcohol/week: 4.0 standard drinks     Types: 4 Shots of liquor per week      Comment: maybe 4 shots per month   • Drug use: No     Medications Prior to Admission   Medication Sig Dispense Refill Last Dose   • apixaban (ELIQUIS) 5 MG tablet tablet Take 5 mg by mouth 2 (Two) Times a Day.   10/14/2021 at Unknown time   • aspirin 81 MG EC tablet Take 1 tablet by mouth Daily. 30 tablet 5 10/14/2021 at Unknown time   • atorvastatin (LIPITOR) 40 MG tablet Take 40 mg by mouth Every Morning.   10/14/2021 at Unknown time   • budesonide (PULMICORT) 0.5 MG/2ML nebulizer solution Take 0.5 mg by nebulization 2 (Two) Times a Day As Needed.   10/14/2021 at Unknown time   • clopidogrel (PLAVIX) 75 MG tablet Take 75 mg by mouth Every Morning.   10/14/2021 at Unknown time   • furosemide (LASIX) 20 MG tablet Take 20 mg by mouth Every Morning.   10/14/2021 at Unknown time   • ipratropium-albuterol (DUO-NEB) 0.5-2.5 mg/3 ml nebulizer 3 mL 4 (Four) Times a Day As Needed.   10/14/2021 at Unknown time   • losartan (COZAAR) 50 MG tablet Take 50 mg by mouth Every Morning.   10/14/2021 at Unknown time   • metoprolol tartrate (LOPRESSOR) 25 MG tablet Take 25 mg by mouth 2 (Two) Times a Day.   10/14/2021 at Unknown time   • nitroglycerin (NITROSTAT) 0.4 MG SL tablet Place 0.4 mg under the tongue Every 5 (Five) Minutes As Needed for Chest Pain.   10/14/2021 at Unknown time   • Perforomist 20 MCG/2ML nebulizer solution 20 mcg 2 (Two) Times a Day As Needed.   10/14/2021 at Unknown time   • potassium chloride (K-DUR) 10 MEQ CR tablet Take 10 mEq by mouth 3 (Three) Times a Day With Meals.   10/14/2021 at Unknown time   • pregabalin (LYRICA) 150 MG capsule TAKE ONE CAPSULE BY MOUTH TWICE A DAY 60 capsule 2 10/14/2021 at Unknown time   • rOPINIRole (REQUIP) 0.5 MG tablet Take 0.5 mg by mouth Every Night.   10/13/2021 at Unknown time   • VENTOLIN  (90 Base) MCG/ACT inhaler Inhale 2 puffs Every 6 (Six) Hours As Needed for Wheezing or Shortness of Air.   Past Month at Unknown time   • miconazole (MICOTIN) 2 % cream  Apply 1 application topically to the appropriate area as directed Every 12 (Twelve) Hours. 60 g 1 Unknown at Unknown time   • mirtazapine (REMERON) 30 MG tablet Take 30 mg by mouth Every Night.   Unknown at Unknown time   • oxyCODONE-acetaminophen (Percocet)  MG per tablet Take 1 tablet by mouth Every 6 (Six) Hours As Needed for Moderate Pain . 120 tablet 0      Allergies:  Haloperidol, Morphine, Pantoprazole, and Latex    Objective     Vital Signs  Temp:  [97.3 °F (36.3 °C)-97.9 °F (36.6 °C)] 97.7 °F (36.5 °C)  Heart Rate:  [70-88] 87  Resp:  [16-18] 18  BP: (120-182)/(68-97) 120/75     Physical Exam:      General Appearance:    Alert, cooperative, in no acute distress   Head:    Normocephalic, without obvious abnormality, atraumatic   Eyes:            Lids and lashes normal, conjunctivae and sclerae normal, no   icterus, no pallor, corneas clear, PERRLA   Ears:    Ears appear intact with no abnormalities noted   Throat:   No oral lesions, no thrush, oral mucosa moist   Neck:   No adenopathy, supple, trachea midline, no thyromegaly, no   carotid bruit, no JVD   Lungs:     Clear to auscultation,respirations regular, even and                  Unlabored, decreased in the bases    Heart:    Regular rhythm and normal rate, normal S1 and S2, no            murmur, no gallop, no rub, no click   Chest Wall:    No abnormalities observed   Abdomen:     Normal bowel sounds, no masses, no organomegaly, soft        non-tender, non-distended, no guarding, no rebound                Tenderness, chronic abd wound - no evidence of infection   Extremities:   Moves all extremities well, + LE edema, no cyanosis, no             redness   Pulses:   Pulses palpable and equal bilaterally   Skin:   No bleeding, bruising or rash   Lymph nodes:   No palpable adenopathy   Neurologic:   Cranial nerves 2 - 12 grossly intact, sensation intact, DTR       present and equal bilaterally       Results Review:     Imaging Results (Last 24 Hours)      Procedure Component Value Units Date/Time    XR Chest 2 View [[id number]] Collected: 10/14/21 1632     Updated: 10/14/21 1635    Narrative:      DATE OF EXAM:  10/14/2021 4:29 PM     PROCEDURE:  XR CHEST 2 VW-     INDICATIONS:  Chest pain.       COMPARISON:  7/23/2021.     TECHNIQUE:   Two radiologic views of the chest.     FINDINGS:  The heart is borderline enlarged. The pulmonary vascular markings are  normal. The lungs and pleural spaces are clear of active disease. There  is a left-sided transvenous pacemaker in place.  There is degenerative  spondylosis of the thoracic spine.       Impression:         1. Borderline cardiomegaly.  2. No active pulmonary disease.     Electronically Signed By-Yusuf Gray MD On:10/14/2021 4:33 PM  This report was finalized on [id number] by  Yusuf Gray MD.           Lab Results (last 24 hours)     Procedure Component Value Units Date/Time    Basic Metabolic Panel [[id number]]  (Abnormal) Collected: 10/15/21 0610    Specimen: Blood Updated: 10/15/21 0806     Glucose 138 mg/dL      BUN 19 mg/dL      Creatinine 1.09 mg/dL      Sodium 138 mmol/L      Potassium 4.3 mmol/L      Chloride 100 mmol/L      CO2 25.0 mmol/L      Calcium 9.1 mg/dL      eGFR Non African Amer 66 mL/min/1.73      BUN/Creatinine Ratio 17.4     Anion Gap 13.0 mmol/L     Narrative:      GFR Normal >60  Chronic Kidney Disease <60  Kidney Failure <15      Troponin [[id number]]  (Normal) Collected: 10/15/21 0610    Specimen: Blood Updated: 10/15/21 0806     Troponin T <0.010 ng/mL     Narrative:      Troponin T Reference Range:  <= 0.03 ng/mL-   Negative for AMI  >0.03 ng/mL-     Abnormal for myocardial necrosis.  Clinicians would have to utilize clinical acumen, EKG, Troponin and serial changes to determine if it is an Acute Myocardial Infarction or myocardial injury due to an underlying chronic condition.       Results may be falsely decreased if patient taking Biotin.      CBC Auto Differential [[id number]]   (Abnormal) Collected: 10/15/21 0610    Specimen: Blood Updated: 10/15/21 0721     WBC 7.90 10*3/mm3      RBC 4.24 10*6/mm3      Hemoglobin 12.2 g/dL      Hematocrit 35.2 %      MCV 82.9 fL      MCH 28.8 pg      MCHC 34.7 g/dL      RDW 18.1 %      RDW-SD 52.1 fl      MPV 8.5 fL      Platelets 310 10*3/mm3      Neutrophil % 63.3 %      Lymphocyte % 21.2 %      Monocyte % 9.6 %      Eosinophil % 4.7 %      Basophil % 1.2 %      Neutrophils, Absolute 5.00 10*3/mm3      Lymphocytes, Absolute 1.70 10*3/mm3      Monocytes, Absolute 0.80 10*3/mm3      Eosinophils, Absolute 0.40 10*3/mm3      Basophils, Absolute 0.10 10*3/mm3      nRBC 0.2 /100 WBC     POC Glucose Once [[id number]]  (Abnormal) Collected: 10/14/21 2013    Specimen: Blood Updated: 10/14/21 2014     Glucose 164 mg/dL      Comment: Serial Number: [id number]:  [id number]       COVID PRE-OP / PRE-PROCEDURE SCREENING ORDER (NO ISOLATION) - Swab, Nasopharynx [[id number]]  (Normal) Collected: 10/14/21 1833    Specimen: Swab from Nasopharynx Updated: 10/14/21 1858    Narrative:      The following orders were created for panel order COVID PRE-OP / PRE-PROCEDURE SCREENING ORDER (NO ISOLATION) - Swab, Nasopharynx.  Procedure                               Abnormality         Status                     ---------                               -----------         ------                     COVID-19,CEPHEID/BRITTANI/BD...[[id number]]  Normal              Final result                 Please view results for these tests on the individual orders.    COVID-19,CEPHEID/BRITTANI/BDMAX,COR/SMOOTH/PAD/SARAI IN-HOUSE(OR EMERGENT/ADD-ON),NP SWAB IN TRANSPORT MEDIA 3-4 HR TAT, RT-PCR - Swab, Nasopharynx [[id number]]  (Normal) Collected: 10/14/21 1833    Specimen: Swab from Nasopharynx Updated: 10/14/21 1858     COVID19 Not Detected    Narrative:      Fact sheet for providers: https://www.fda.gov/media/067123/download     Fact sheet for patients: https://www.fda.gov/media/559204/download  Fact sheet  for providers: https://www.fda.gov/media/058821/download    Fact sheet for patients: https://www.fda.gov/media/244651/download    Test performed by PCR.    Comprehensive Metabolic Panel [[id number]]  (Abnormal) Collected: 10/14/21 1633    Specimen: Blood Updated: 10/14/21 1658     Glucose 143 mg/dL      BUN 21 mg/dL      Creatinine 1.07 mg/dL      Sodium 135 mmol/L      Potassium 4.6 mmol/L      Chloride 99 mmol/L      CO2 24.0 mmol/L      Calcium 8.8 mg/dL      Total Protein 6.8 g/dL      Albumin 3.70 g/dL      ALT (SGPT) 11 U/L      AST (SGOT) 14 U/L      Alkaline Phosphatase 166 U/L      Total Bilirubin 0.6 mg/dL      eGFR Non African Amer 67 mL/min/1.73      Globulin 3.1 gm/dL      A/G Ratio 1.2 g/dL      BUN/Creatinine Ratio 19.6     Anion Gap 12.0 mmol/L     Narrative:      GFR Normal >60  Chronic Kidney Disease <60  Kidney Failure <15      Troponin [[id number]]  (Normal) Collected: 10/14/21 1633    Specimen: Blood Updated: 10/14/21 1658     Troponin T <0.010 ng/mL     Narrative:      Troponin T Reference Range:  <= 0.03 ng/mL-   Negative for AMI  >0.03 ng/mL-     Abnormal for myocardial necrosis.  Clinicians would have to utilize clinical acumen, EKG, Troponin and serial changes to determine if it is an Acute Myocardial Infarction or myocardial injury due to an underlying chronic condition.       Results may be falsely decreased if patient taking Biotin.      BNP [[id number]]  (Normal) Collected: 10/14/21 1633    Specimen: Blood Updated: 10/14/21 1656     proBNP 722.7 pg/mL     Narrative:      Among patients with dyspnea, NT-proBNP is highly sensitive for the detection of acute congestive heart failure. In addition NT-proBNP of <300 pg/ml effectively rules out acute congestive heart failure with 99% negative predictive value.    Results may be falsely decreased if patient taking Biotin.      CBC & Differential [[id number]]  (Abnormal) Collected: 10/14/21 1633    Specimen: Blood Updated: 10/14/21 1640     Narrative:      The following orders were created for panel order CBC & Differential.  Procedure                               Abnormality         Status                     ---------                               -----------         ------                     CBC Auto Differential[[id number]]        Abnormal            Final result                 Please view results for these tests on the individual orders.    CBC Auto Differential [[id number]]  (Abnormal) Collected: 10/14/21 1633    Specimen: Blood Updated: 10/14/21 1640     WBC 8.10 10*3/mm3      RBC 4.20 10*6/mm3      Hemoglobin 12.1 g/dL      Hematocrit 35.1 %      MCV 83.5 fL      MCH 28.9 pg      MCHC 34.6 g/dL      RDW 18.7 %      RDW-SD 54.7 fl      MPV 8.0 fL      Platelets 304 10*3/mm3      Neutrophil % 69.7 %      Lymphocyte % 17.5 %      Monocyte % 8.1 %      Eosinophil % 3.7 %      Basophil % 1.0 %      Neutrophils, Absolute 5.60 10*3/mm3      Lymphocytes, Absolute 1.40 10*3/mm3      Monocytes, Absolute 0.70 10*3/mm3      Eosinophils, Absolute 0.30 10*3/mm3      Basophils, Absolute 0.10 10*3/mm3      nRBC 0.0 /100 WBC            I reviewed the patient's new clinical results.    Assessment/Plan       Angina of effort (HCC)  - admit for cardiac w/u given his h/o CAD.  Cardiology consult.  Trop have been negative. Stress test has already been performed.  Await the results.  Continue current meds    HTN with CKD3 - no evidence of renal failure.  Continue home meds  CHF - echo pending  DM2 with diabetic neuropathy  Chronic open abd wound sp hernia repair 10 years ago - wound care consult  COPD - home meds      I discussed the patients findings and my recommendations with patient.     Virginia Jean MD  10/15/21  08:48 EDT

## 2021-10-15 NOTE — PLAN OF CARE
Goal Outcome Evaluation:  Plan of Care Reviewed With: patient        Progress: no change  Outcome Summary: Pt is a 75 y/o M who presented to [facility] w/ c/o intermittent chest pain over the past two weeks, requiring use of 1-2 nitroglycerin tablets to relieve, and with pain with exertion or ambulation. Pt had stress test earlier today and with plans for cardiac cath on Monday. Pt lives in a single level apartment with his wife and was independent w/ household mobility, used RW for community mobility, and independent w/ all ADLs. Pt appears to be functioning close to baseline with no signficant strenth, balance, or endurance deficits, demonstrates MOD I with bed mobility/transfers, and completes gait training 300 ft with RW reqiring supervision with no chest pain throughout session. Anticipate safe return home with wife with no further skilled physical therapy needs and will be discharged from PT services. PPE: gloves, mask, safety glasses

## 2021-10-15 NOTE — CONSULTS
Referring Provider: Yusuf Jones MD  Reason for Consultation:  Chest pain  Status post stent  Status post pacemaker      Patient Care Team:  Yusuf Jones MD as PCP - Joshua Evangelista MD as Consulting Physician (Cardiology)  Moise Watson MD as Consulting Physician (Cardiac Electrophysiology)  Izzy Alvarez MD as Consulting Physician (Nephrology)  Celine Swnason MD as Consulting Physician (Cardiology)    Chief complaint  Chest pain    Subjective .     History of present illness:  Ro Nathan is a 76 y.o. male who presents with history of multiple cardiac and noncardiac problems is documented in the assessment and plan was admitted to the hospital with history of chest pain for last 2 weeks.  Patient has been having chest pain almost every day and has been taking 1-2 nitroglycerin every day.  Sometimes with exertion sometimes at rest.  Patient recently was seen in the office and was scheduled to have stress Cardiolite test.  However patient has significant symptoms and patient came to the emergency room.  EKG showed no acute changes.  Patient denies having any fever cough chills.  Chest discomfort is substernal heaviness and tightness without any radiation of the discomfort into the neck or into the arms.  No associated sweating nausea vomiting or shortness of breath.             ROS      Since I have last seen, the patient has been without any palpitations, dizziness or syncope.  Denies having any headache ,abdominal pain ,nausea, vomiting , diarrhea constipation, loss of weight or loss of appetite.  Denies having any excessive bruising ,hematuria or blood in the stool.    Review of all systems negative except as indicated      History  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) 10/26/2020   • Benign hypertension with CKD (chronic kidney disease) stage III (HCC) 10/26/2020   • Bruises easily    • CHF (congestive  heart failure) ([facility])    • Chronic bronchitis with COPD (chronic obstructive pulmonary disease) ([facility]) 10/26/2020   • Chronic respiratory failure with hypoxia ([facility]) 10/26/2020   • Congenital heart defect    • Difficulty attaining erection    • Hypertension    • Low back pain    • Pacemaker    • Peripheral vascular disease due to secondary diabetes ([facility]) 10/26/2020   • Poor circulation    • Prostate cancer ([facility])     prostate   • Shortness of breath    • Sleep apnea     using CPAP    • Stage 3a chronic kidney disease ([facility]) 10/26/2020   • Stented coronary artery 12/05/2019    MICHAEL to LAD   • Type 2 diabetes, controlled, with neuropathy ([facility]) 10/26/2020    no meds   • Type 2 diabetes, controlled, with neuropathy ([facility]) 10/26/2020       Past Surgical History:   Procedure Laterality Date   • ABDOMINAL SURGERY     • APPENDECTOMY     • BONE CURETTAGE Right 1/22/2021    Procedure: Wound excision with fifth metatarsal head resection, right foot.;  Surgeon: Josef Egan DPM;  Location: [facility] MAIN OR;  Service: Podiatry;  Laterality: Right;   • BONE INCISION AND DRAINAGE Right 10/5/2020    Procedure: Incision and drainage with debridement of all nonviable soft tissue and bone with bone biopsy, right foot.;  Surgeon: Josef Egan DPM;  Location: [facility] MAIN OR;  Service: Podiatry;  Laterality: Right;   • CARDIAC CATHETERIZATION N/A 12/5/2019    Procedure: Left Heart Cath;  Surgeon: Mirza Munson MD;  Location: [facility] CATH INVASIVE LOCATION;  Service: Cardiology   • CARDIAC CATHETERIZATION N/A 12/5/2019    Procedure: Stent MICHAEL coronary;  Surgeon: Mirza Munson MD;  Location: [facility] CATH INVASIVE LOCATION;  Service: Cardiology   • CHOLECYSTECTOMY     • ELBOW PROCEDURE      left   • FOOT SURGERY      right foot   • HERNIA REPAIR     • INCISION AND DRAINAGE OF WOUND Right 4/6/2021    Procedure: INCISION AND DRAINAGE OF RIGHT FOOT 5TH METATARSAL TO BONE AND PARTIAL 5TH METATARSAL RESECTION;   Surgeon: Josef Egan DPM;  Location: [facility] MAIN OR;  Service: Podiatry;  Laterality: Right;   • INCISION AND DRAINAGE OF WOUND Right 2021    Procedure: INCISION AND DRAINAGE BONE, DELAYED PRIMARY CLOSURE;  Surgeon: Josef Egan DPM;  Location: [facility] MAIN OR;  Service: Podiatry;  Laterality: Right;   • INTERVENTIONAL RADIOLOGY PROCEDURE N/A 2019    Procedure: Intravascular Ultrasound;  Surgeon: Mirza Munson MD;  Location: [facility] CATH INVASIVE LOCATION;  Service: Cardiology   • PACEMAKER IMPLANTATION     • NJ RT/LT HEART CATHETERS N/A 2019    Procedure: Percutaneous Coronary Intervention;  Surgeon: Mirza Munson MD;  Location: [facility] CATH INVASIVE LOCATION;  Service: Cardiology   • WOUND DEBRIDEMENT Right 10/29/2020    Procedure: Preparation and debridement of foot wound with application of Integra wound graft, right foot.;  Surgeon: Josef Egan DPM;  Location: [facility] MAIN OR;  Service: Podiatry;  Laterality: Right;   • WOUND DEBRIDEMENT N/A 2021    Procedure: EXCISIONAL ABDOMINAL WOUND  DEBRIDEMENT;  Surgeon: Cleopatra Wang MD;  Location: [facility] MAIN OR;  Service: General;  Laterality: N/A;   • WOUND DEBRIDEMENT N/A 2021    Procedure: DEBRIDEMENT OF ABDOMINAL WALL;  Surgeon: Hill Bhatia DO;  Location: [facility] MAIN OR;  Service: General;  Laterality: N/A;       Family History   Problem Relation Age of Onset   • Alzheimer's disease Mother    • GI problems Father    • Heart disease Father        Social History     Tobacco Use   • Smoking status: Former Smoker     Years: 15.00     Types: Cigarettes     Start date: 1966     Quit date: 5/10/2020     Years since quittin.4   • Smokeless tobacco: Never Used   Vaping Use   • Vaping Use: Never used   Substance Use Topics   • Alcohol use: Yes     Alcohol/week: 4.0 standard drinks     Types: 4 Shots of liquor per week     Comment: maybe 4 shots per month   • Drug use: No        Medications Prior to  Admission   Medication Sig Dispense Refill Last Dose   • apixaban (ELIQUIS) 5 MG tablet tablet Take 5 mg by mouth 2 (Two) Times a Day.   10/14/2021 at Unknown time   • aspirin 81 MG EC tablet Take 1 tablet by mouth Daily. 30 tablet 5 10/14/2021 at Unknown time   • atorvastatin (LIPITOR) 40 MG tablet Take 40 mg by mouth Every Morning.   10/14/2021 at Unknown time   • budesonide (PULMICORT) 0.5 MG/2ML nebulizer solution Take 0.5 mg by nebulization 2 (Two) Times a Day As Needed.   10/14/2021 at Unknown time   • clopidogrel (PLAVIX) 75 MG tablet Take 75 mg by mouth Every Morning.   10/14/2021 at Unknown time   • furosemide (LASIX) 20 MG tablet Take 20 mg by mouth Every Morning.   10/14/2021 at Unknown time   • ipratropium-albuterol (DUO-NEB) 0.5-2.5 mg/3 ml nebulizer 3 mL 4 (Four) Times a Day As Needed.   10/14/2021 at Unknown time   • losartan (COZAAR) 50 MG tablet Take 50 mg by mouth Every Morning.   10/14/2021 at Unknown time   • metoprolol tartrate (LOPRESSOR) 25 MG tablet Take 25 mg by mouth 2 (Two) Times a Day.   10/14/2021 at Unknown time   • nitroglycerin (NITROSTAT) 0.4 MG SL tablet Place 0.4 mg under the tongue Every 5 (Five) Minutes As Needed for Chest Pain.   10/14/2021 at Unknown time   • Perforomist 20 MCG/2ML nebulizer solution 20 mcg 2 (Two) Times a Day As Needed.   10/14/2021 at Unknown time   • potassium chloride (K-DUR) 10 MEQ CR tablet Take 10 mEq by mouth 3 (Three) Times a Day With Meals.   10/14/2021 at Unknown time   • pregabalin (LYRICA) 150 MG capsule TAKE ONE CAPSULE BY MOUTH TWICE A DAY 60 capsule 2 10/14/2021 at Unknown time   • rOPINIRole (REQUIP) 0.5 MG tablet Take 0.5 mg by mouth Every Night.   10/13/2021 at Unknown time   • VENTOLIN  (90 Base) MCG/ACT inhaler Inhale 2 puffs Every 6 (Six) Hours As Needed for Wheezing or Shortness of Air.   Past Month at Unknown time   • miconazole (MICOTIN) 2 % cream Apply 1 application topically to the appropriate area as directed Every 12 (Twelve)  "Hours. 60 g 1 Unknown at Unknown time   • mirtazapine (REMERON) 30 MG tablet Take 30 mg by mouth Every Night.   Unknown at Unknown time   • oxyCODONE-acetaminophen (Percocet)  MG per tablet Take 1 tablet by mouth Every 6 (Six) Hours As Needed for Moderate Pain . 120 tablet 0          Haloperidol, Morphine, Pantoprazole, and Latex    Scheduled Meds:apixaban, 5 mg, Oral, Q12H  aspirin, 81 mg, Oral, Daily  atorvastatin, 40 mg, Oral, QAM  budesonide, 0.5 mg, Nebulization, BID - RT  clopidogrel, 75 mg, Oral, QAM  famotidine, 40 mg, Oral, Daily  furosemide, 20 mg, Oral, QAM  losartan, 50 mg, Oral, QAM  mirtazapine, 30 mg, Oral, Nightly  potassium chloride, 10 mEq, Oral, TID With Meals  pregabalin, 150 mg, Oral, BID  rOPINIRole, 0.5 mg, Oral, Nightly  sodium chloride, 3 mL, Intravenous, Q12H  traZODone, 100 mg, Oral, Nightly      Continuous Infusions:   PRN Meds:.•  acetaminophen  •  influenza vaccine  •  ipratropium-albuterol  •  nitroglycerin  •  ondansetron  •  oxyCODONE  •  [COMPLETED] Insert peripheral IV **AND** sodium chloride  •  sodium chloride    Objective     VITAL SIGNS  Vitals:    10/14/21 2312 10/14/21 2322 10/14/21 2338 10/15/21 0326   BP: (!) 182/84 120/71 151/79 120/75   BP Location:   Left arm Left arm   Patient Position:   Lying Lying   Pulse: 76  74 84   Resp:   17 18   Temp:   97.7 °F (36.5 °C) 97.7 °F (36.5 °C)   TempSrc:   Oral Oral   SpO2: 95%  97% 97%   Weight:    126 kg (278 lb)   Height:           Flowsheet Rows      First Filed Value   Admission Height 182.9 cm (72\") Documented at 10/14/2021 1532   Admission Weight 131 kg (288 lb 12.8 oz) Documented at 10/14/2021 1532          No intake or output data in the 24 hours ending 10/15/21 0631     TELEMETRY: Pacemaker rhythm    Physical Exam:  The patient is alert, oriented and in no distress.  Vital signs as noted above.  Exogenous obesity.  Head and neck revealed no carotid bruits or jugular venous distention.  No thyromegaly or lymph " adenopathy is present  Lungs clear.  No wheezing.  Breath sounds are normal bilaterally.  Heart normal first and second heart sounds.No murmur.  No precordial rub is present.  No gallop is present.  Abdomen soft and nontender.  No organomegaly is present.  Large ventral hernia.  Extremities with good peripheral pulses without any pedal edema.  Skin warm and dry.  Musculoskeletal system is grossly normal  CNS grossly normal      Results Review:   I reviewed the patient's new clinical results.  Lab Results (last 24 hours)     Procedure Component Value Units Date/Time    POC Glucose Once [[id number]]  (Abnormal) Collected: 10/14/21 2013    Specimen: Blood Updated: 10/14/21 2014     Glucose 164 mg/dL      Comment: Serial Number: [id number]:  [id number]       COVID PRE-OP / PRE-PROCEDURE SCREENING ORDER (NO ISOLATION) - Swab, Nasopharynx [[id number]]  (Normal) Collected: 10/14/21 1833    Specimen: Swab from Nasopharynx Updated: 10/14/21 1858    Narrative:      The following orders were created for panel order COVID PRE-OP / PRE-PROCEDURE SCREENING ORDER (NO ISOLATION) - Swab, Nasopharynx.  Procedure                               Abnormality         Status                     ---------                               -----------         ------                     COVID-19,CEPHEID/BRITTANI/BD...[[id number]]  Normal              Final result                 Please view results for these tests on the individual orders.    COVID-19,CEPHEID/BRITTANI/BDMAX,COR/SMOOTH/PAD/SARAI IN-HOUSE(OR EMERGENT/ADD-ON),NP SWAB IN TRANSPORT MEDIA 3-4 HR TAT, RT-PCR - Swab, Nasopharynx [[id number]]  (Normal) Collected: 10/14/21 1833    Specimen: Swab from Nasopharynx Updated: 10/14/21 1858     COVID19 Not Detected    Narrative:      Fact sheet for providers: https://www.fda.gov/media/817438/download     Fact sheet for patients: https://www.fda.gov/media/880966/download  Fact sheet for providers: https://www.fda.gov/media/657196/download    Fact sheet for  patients: https://www.[employer]ality[employer].gov/media/745415/download    Test performed by PCR.    Comprehensive Metabolic Panel [[id number]]  (Abnormal) Collected: 10/14/21 1633    Specimen: Blood Updated: 10/14/21 1658     Glucose 143 mg/dL      BUN 21 mg/dL      Creatinine 1.07 mg/dL      Sodium 135 mmol/L      Potassium 4.6 mmol/L      Chloride 99 mmol/L      CO2 24.0 mmol/L      Calcium 8.8 mg/dL      Total Protein 6.8 g/dL      Albumin 3.70 g/dL      ALT (SGPT) 11 U/L      AST (SGOT) 14 U/L      Alkaline Phosphatase 166 U/L      Total Bilirubin 0.6 mg/dL      eGFR Non African Amer 67 mL/min/1.73      Globulin 3.1 gm/dL      A/G Ratio 1.2 g/dL      BUN/Creatinine Ratio 19.6     Anion Gap 12.0 mmol/L     Narrative:      GFR Normal >60  Chronic Kidney Disease <60  Kidney Failure <15      Troponin [[id number]]  (Normal) Collected: 10/14/21 1633    Specimen: Blood Updated: 10/14/21 1658     Troponin T <0.010 ng/mL     Narrative:      Troponin T Reference Range:  <= 0.03 ng/mL-   Negative for AMI  >0.03 ng/mL-     Abnormal for myocardial necrosis.  Clinicians would have to utilize clinical acumen, EKG, Troponin and serial changes to determine if it is an Acute Myocardial Infarction or myocardial injury due to an underlying chronic condition.       Results may be falsely decreased if patient taking Biotin.      BNP [[id number]]  (Normal) Collected: 10/14/21 1633    Specimen: Blood Updated: 10/14/21 1656     proBNP 722.7 pg/mL     Narrative:      Among patients with dyspnea, NT-proBNP is highly sensitive for the detection of acute congestive heart failure. In addition NT-proBNP of <300 pg/ml effectively rules out acute congestive heart failure with 99% negative predictive value.    Results may be falsely decreased if patient taking Biotin.      CBC & Differential [[id number]]  (Abnormal) Collected: 10/14/21 1633    Specimen: Blood Updated: 10/14/21 1640    Narrative:      The following orders were created for panel order CBC &  Differential.  Procedure                               Abnormality         Status                     ---------                               -----------         ------                     CBC Auto Differential[[id number]]        Abnormal            Final result                 Please view results for these tests on the individual orders.    CBC Auto Differential [[id number]]  (Abnormal) Collected: 10/14/21 1633    Specimen: Blood Updated: 10/14/21 1640     WBC 8.10 10*3/mm3      RBC 4.20 10*6/mm3      Hemoglobin 12.1 g/dL      Hematocrit 35.1 %      MCV 83.5 fL      MCH 28.9 pg      MCHC 34.6 g/dL      RDW 18.7 %      RDW-SD 54.7 fl      MPV 8.0 fL      Platelets 304 10*3/mm3      Neutrophil % 69.7 %      Lymphocyte % 17.5 %      Monocyte % 8.1 %      Eosinophil % 3.7 %      Basophil % 1.0 %      Neutrophils, Absolute 5.60 10*3/mm3      Lymphocytes, Absolute 1.40 10*3/mm3      Monocytes, Absolute 0.70 10*3/mm3      Eosinophils, Absolute 0.30 10*3/mm3      Basophils, Absolute 0.10 10*3/mm3      nRBC 0.0 /100 WBC           Imaging Results (Last 24 Hours)     Procedure Component Value Units Date/Time    XR Chest 2 View [[id number]] Collected: 10/14/21 1632     Updated: 10/14/21 1635    Narrative:      DATE OF EXAM:  10/14/2021 4:29 PM     PROCEDURE:  XR CHEST 2 VW-     INDICATIONS:  Chest pain.       COMPARISON:  7/23/2021.     TECHNIQUE:   Two radiologic views of the chest.     FINDINGS:  The heart is borderline enlarged. The pulmonary vascular markings are  normal. The lungs and pleural spaces are clear of active disease. There  is a left-sided transvenous pacemaker in place.  There is degenerative  spondylosis of the thoracic spine.       Impression:         1. Borderline cardiomegaly.  2. No active pulmonary disease.     Electronically Signed By-Yusuf Gray MD On:10/14/2021 4:33 PM  This report was finalized on [id number] by  Yusuf Gray MD.      LAB RESULTS (LAST 7 DAYS)    CBC  Results from last 7 days   Lab  Units 10/14/21  1633   WBC 10*3/mm3 8.10   RBC 10*6/mm3 4.20   HEMOGLOBIN g/dL 12.1*   HEMATOCRIT % 35.1*   MCV fL 83.5   PLATELETS 10*3/mm3 304       BMP  Results from last 7 days   Lab Units 10/14/21  1633   SODIUM mmol/L 135*   POTASSIUM mmol/L 4.6   CHLORIDE mmol/L 99   CO2 mmol/L 24.0   BUN mg/dL 21   CREATININE mg/dL 1.07   GLUCOSE mg/dL 143*       CMP   Results from last 7 days   Lab Units 10/14/21  1633   SODIUM mmol/L 135*   POTASSIUM mmol/L 4.6   CHLORIDE mmol/L 99   CO2 mmol/L 24.0   BUN mg/dL 21   CREATININE mg/dL 1.07   GLUCOSE mg/dL 143*   ALBUMIN g/dL 3.70   BILIRUBIN mg/dL 0.6   ALK PHOS U/L 166*   AST (SGOT) U/L 14   ALT (SGPT) U/L 11         BNP        TROPONIN  Results from last 7 days   Lab Units 10/14/21  1633   TROPONIN T ng/mL <0.010       CoAg        Creatinine Clearance  Estimated Creatinine Clearance: 81.8 mL/min (by C-G formula based on SCr of 1.07 mg/dL).    ABG        Radiology  XR Chest 2 View    Result Date: 10/14/2021   1. Borderline cardiomegaly. 2. No active pulmonary disease.  Electronically Signed By-Yusuf Gray MD On:10/14/2021 4:33 PM This report was finalized on [id number] by  Yusuf Gray MD.              EKG                I personally viewed and interpreted the patient's EKG/Telemetry data: Ventricular pacemaker rhythm.    ECHOCARDIOGRAM:    Results for orders placed during the hospital encounter of 07/16/21    Adult Transesophageal Echo (EMBER) W/ Cont if Necessary Per Protocol    Interpretation Summary  Date of procedure  7/22/2021.    Procedure performed  Transesophageal echocardiogram color continuous-wave and pulse-wave Doppler study    Procedure  Anesthesia was provided by anesthesiologist with intravenous Diprivan.  EMBER probe could be passed without difficulty.  Patient tolerated the procedure well.  No complications were noted.    Results  Technically satisfactory study.  Mitral valve is structurally normal.  Significant mitral regurgitation is present with a  central jet with extending to the lateral wall into the superior aspect of the left atrium.  Tricuspid valve is normal.  Mild tricuspid regurgitation is present.  Aortic valve is tricuspid and is normal.  No evidence for valvular vegetations is present.  Left atrium is enlarged.  Left atrial appendage is enlarged.  No evidence for intracardiac clot is present.  Right atrium is normal in size.  Right ventricle is normal in size.  Atrial septum is intact.  Normal size left ventricle and normal contractility with ejection fraction of 60%.  No pericardial effusion is present.  Aorta is normal.    Impression  No evidence for valvular vegetations is present.  Significant mitral regurgitation with central jet extending to the lateral wall and into the superior aspect of the left atrium.  Mild tricuspid regurgitation.  Calculated pulmonary artery pressure is 25 mmHg.  Left atrial and left atrial appendage enlargement is present without clot.  Normal left ventricle size and contractility with ejection fraction of 60%.              Cardiolite (Tc-99m Sestamibi) stress test      OTHER:     Assessment/Plan     Active Problems:    Angina of effort (HCC)    ]]]]]]]]]]]]]]]]]]]]]  Impression  ========  -Exertional and nonexertional chest discomfort suggestive of angina pectoris.     -Status post stent placement   Status post stent to proximal LAD 12/5/2019.  Patient had stent to circumflex and RCA in the past.     Cardiac catheterization 12/5/2019 by Dr. Munson  Left ventriculography  The overall estimated left ventricular ejection fraction was 65%.  Native coronary arteriography: Left dominant circulation     1.  Left main coronary arteries a large-caliber vessel gives rise left anterior descending left circumflex flex arteries.  There is an ostial eccentric 40% lesion that appears angiographically to approach 50%. No significant coronary atherosclerotic disease present.  2.  Left anterior descending artery is a large-caliber  vessel that gives rise to large caliber diagonal branches and courses along the anterior interventricular groove and wraps around the apex.  There is a proximal eccentric 50% lesion within an in-stent restenotic segment followed by an greater than 80% focal eccentric in-stent restenotic lesion followed by a 60% lesion after the stented segment and otherwise no coronary atherosclerotic disease of any significance present.  3.  The left circumflex arteries a large-caliber vessel gives rise to large caliber bifurcating obtuse marginal branch.  There is an ostial proximal concentric 50% lesion that is calcified that immediately gives rise to a very high first obtuse marginal branch that is best classified as a ramus intermedius branch supplying large territory in the anterolateral wall that has an ostial proximal 70% lesion.  The calcified continuing circumflex and the posterior AV groove gives a second obtuse marginal branch that has mild diffuse disease, a third obtuse marginal branch and then a bifurcating system that has a bifurcation stenting within it before giving rise to the left PDA.  There is diffuse 50% in-stent restenosis within this bifurcation stented segment into the obtuse marginal branch which itself has diffuse 50 to 60% disease within the stented segment.  There is a concentric 80% lesion in the circumflex PDA.  No significant coronary atherosclerotic disease present  4.  The right coronary arteries a large-caliber nondominant vessel has a stented mid segment after which there several acute marginal branches there is 50% in-stent restenosis approaching 60% at some portions within this nondominant right proximal to mid proximal segment.     Conclusions:     1.  Normal left heart filling pressures with preserved LV systolic function  2.  Normal epicardial anatomy with severe two-vessel coronary artery disease involving what is likely the culprit for the patient's chest pain syndrome, namely the very  severe proximal LAD lesions.  We will treat the remaining circumflex lesions with medical therapy and see if the patient's symptoms resolved; this includes the severe lesion within the proximal portion of the high obtuse marginal branch within the circumflex system as well.     -Status post permanent ventricular pacemaker implantation ([employer] 1/13/2015)  Battery status is 4.5 years.     -History of fever and sepsis.  Positive blood cultures for coagulase-negative staph.      EMBER 7/22/2021 revealed  No evidence for valvular vegetations is present.  Significant mitral regurgitation with central jet extending to the lateral wall and into the superior aspect of the left atrium.  Mild tricuspid regurgitation.  Calculated pulmonary artery pressure is 25 mmHg.  Left atrial and left atrial appendage enlargement is present without clot.  Normal left ventricle size and contractility with ejection fraction of 60%.     -Dyslipidemia diabetes hypertension COPD CKD 3.  History of cirrhosis     -Chronic atrial fibrillation     -History of right foot ulceration with bone involvement.     -Peripheral vascular disease     -Large abdominal ventral hernia.     -Status post appendectomy appendectomy cholecystectomy left elbow surgery hernia repair     -Allergic to morphine pantoprazole haloperidol  ===========  Plan  ========  Patient presents to the hospital with increasing symptoms of chest pain recently.  Patient is taking 1 or 2 nitroglycerin every day.  Patient also has shortness of breath.  Echocardiogram showed normal left ventricle function.  Mild mitral regurgitation.  Status post stent 12/5/2019.  Stress Cardiolite test showed inferior and apical ischemic changes.  EKG showed ventricular pacemaker rhythm.  Intrinsic rhythm is atrial fibrillation  Was  Status post pacemaker.  Patient had recently interrogation of the pacemaker today revealed good pacing parameters with battery status of 4.5 years.   Patient is almost 99.7%  paced in the ventricle.     Chronic atrial fibrillation.-Rate controlled     Anticoagulation was reviewed.  Patient was on Eliquis  Eliquis will be held.     Hypertension-113/77     Dyslipidemia-continue atorvastatin    Patient to have EMBER cardiac catheterization and coronary arteriography on Monday at on 4:30 PM.  Risks and benefits pros and cons of the procedure including infection bleeding blood clot heart attack stroke allergic reaction to the dye renal dysfunction etc. were discussed with patient.    .Follow-up in the office on the same day.Further plan will depend on patient's progress.  ]]]]]]]]]]]]]]]]]]       Celine Swanson MD  10/15/21  06:31 EDT

## 2021-10-15 NOTE — THERAPY EVALUATION
Patient Name: Ro Nathan  : 1945    MRN: [id number]                              Today's Date: 10/15/2021       Admit Date: 10/14/2021    Visit Dx:     ICD-10-CM ICD-9-CM   1. Angina of effort (HCC)  I20.8 413.9     Patient Active Problem List   Diagnosis   • Skin ulcer (HCC)   • Chronic low back pain   • DDD (degenerative disc disease), lumbar   • Spondylosis of lumbosacral region   • Paroxysmal atrial fibrillation (HCC)   • Chronic obstructive pulmonary disease (HCC)   • Atherosclerosis of native coronary artery of native heart with angina pectoris (HCC)   • Gastroesophageal reflux disease   • Lumbar radiculopathy   • Other hammer toe(s) (acquired), right foot   • Presence of cardiac pacemaker   • Status post coronary artery stent placement   • COPD (chronic obstructive pulmonary disease) (HCC)   • Stented coronary artery   • Small bowel obstruction due to adhesions (HCC)   • Elevated lipoprotein(a)   • Acute generalized abdominal pain   • Diabetic foot ulcer (HCC)   • Ventral hernia with obstruction but no gangrene   • DM type 2 with diabetic dyslipidemia (HCC)   • Peripheral vascular disease due to secondary diabetes (HCC)   • Benign hypertension with CKD (chronic kidney disease) stage III (HCC)   • Stage 3a chronic kidney disease (HCC)   • Type 2 diabetes, controlled, with neuropathy (HCC)   • Chronic bronchitis with COPD (chronic obstructive pulmonary disease) (HCC)   • 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)   • Chronic respiratory failure with hypoxia (HCC)   • Diabetic ulcer of right midfoot associated with type 2 diabetes mellitus (HCC)   • Dyspnea on exertion   • 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)   • Ischemic ulcer of foot with fat layer exposed, right (HCC)   • Right foot ulcer (HCC)   • Arthritis   • Congestive heart failure (HCC)   • Hiatal hernia   • Presence of coronary angioplasty implant and graft   • Coronary atherosclerosis   • Chronic obstructive  bronchitis (HCC)   • Degeneration of lumbar intervertebral disc   • Ischemic ulcer of foot (HCC)   • Type 2 diabetes mellitus (HCC)   • Benign hypertension   • Acquired hallux malleus   • Peripheral vascular disease due to secondary diabetes mellitus (HCC)   • Obstruction of small intestine due to peritoneal adhesion (HCC)   • Lumbosacral spondylosis   • Obstructed ventral hernia   • Foot ulceration, right, with necrosis of bone (HCC)   • Ulcer of right foot with bone involvement without evidence of necrosis (HCC)   • Diarrhea   • Abdominal bloating   • Acute abdominal pain   • AV block, complete (HCC)   • Sepsis, unspecified   • Cellulitis of abdominal wall   • Angina of effort (HCC)     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) 10/26/2020   • Benign hypertension with CKD (chronic kidney disease) stage III (HCC) 10/26/2020   • Bruises easily    • CHF (congestive heart failure) (HCC)    • Chronic bronchitis with COPD (chronic obstructive pulmonary disease) (HCC) 10/26/2020   • Chronic respiratory failure with hypoxia (HCC) 10/26/2020   • Congenital heart defect    • Difficulty attaining erection    • Hypertension    • Low back pain    • Pacemaker    • Peripheral vascular disease due to secondary diabetes (HCC) 10/26/2020   • Poor circulation    • Prostate cancer (HCC)     prostate   • Shortness of breath    • Sleep apnea     using CPAP    • Stage 3a chronic kidney disease (HCC) 10/26/2020   • Stented coronary artery 12/05/2019    MICHAEL to LAD   • Type 2 diabetes, controlled, with neuropathy (HCC) 10/26/2020    no meds   • Type 2 diabetes, controlled, with neuropathy (HCC) 10/26/2020     Past Surgical History:   Procedure Laterality Date   • ABDOMINAL SURGERY     • APPENDECTOMY     • BONE CURETTAGE Right 1/22/2021    Procedure: Wound excision with fifth metatarsal head resection, right foot.;  Surgeon: Josef Egan DPM;  [facility];  Service:  Podiatry;  Laterality: Right;   • BONE INCISION AND DRAINAGE Right 10/5/2020    Procedure: Incision and drainage with debridement of all nonviable soft tissue and bone with bone biopsy, right foot.;  Surgeon: Josef Egan DPM;  Location: [facility] MAIN OR;  Service: Podiatry;  Laterality: Right;   • CARDIAC CATHETERIZATION N/A 12/5/2019    Procedure: Left Heart Cath;  Surgeon: Mirza Munson MD;  Location: [facility] CATH INVASIVE LOCATION;  Service: Cardiology   • CARDIAC CATHETERIZATION N/A 12/5/2019    Procedure: Stent MICHAEL coronary;  Surgeon: Mirza Munson MD;  Location: [facility] INVASIVE LOCATION;  Service: Cardiology   • CHOLECYSTECTOMY     • ELBOW PROCEDURE      left   • FOOT SURGERY      right foot   • HERNIA REPAIR     • INCISION AND DRAINAGE OF WOUND Right 4/6/2021    Procedure: INCISION AND DRAINAGE OF RIGHT FOOT 5TH METATARSAL TO BONE AND PARTIAL 5TH METATARSAL RESECTION;  Surgeon: Josef Egan DPM;  Location: [facility] MAIN OR;  Service: Podiatry;  Laterality: Right;   • INCISION AND DRAINAGE OF WOUND Right 4/8/2021    Procedure: INCISION AND DRAINAGE BONE, DELAYED PRIMARY CLOSURE;  Surgeon: Josef Egan DPM;  Location: [facility] MAIN OR;  Service: Podiatry;  Laterality: Right;   • INTERVENTIONAL RADIOLOGY PROCEDURE N/A 12/5/2019    Procedure: Intravascular Ultrasound;  Surgeon: Mirza Munson MD;  Location: [facility] INVASIVE LOCATION;  Service: Cardiology   • PACEMAKER IMPLANTATION     • MD RT/LT HEART CATHETERS N/A 12/5/2019    Procedure: Percutaneous Coronary Intervention;  Surgeon: Mirza Munson MD;  Location: [facility] INVASIVE LOCATION;  Service: Cardiology   • WOUND DEBRIDEMENT Right 10/29/2020    Procedure: Preparation and debridement of foot wound with application of Integra wound graft, right foot.;  Surgeon: Josef Egan DPM;  Location: [facility] MAIN OR;  Service: Podiatry;  Laterality: Right;   • WOUND DEBRIDEMENT N/A  7/29/2021    Procedure: EXCISIONAL ABDOMINAL WOUND  DEBRIDEMENT;  Surgeon: Cleopatra Wang MD;  Location: [facility] MAIN OR;  Service: General;  Laterality: N/A;   • WOUND DEBRIDEMENT N/A 8/1/2021    Procedure: DEBRIDEMENT OF ABDOMINAL WALL;  Surgeon: Hill Bhatia DO;  Location: [facility] MAIN OR;  Service: General;  Laterality: N/A;      General Information     Row Name 10/15/21 1430          Physical Therapy Time and Intention    Document Type evaluation  -AO     Mode of Treatment physical therapy  -AO     Row Name 10/15/21 1430          General Information    Patient Profile Reviewed yes  -AO     Prior Level of Function --  Independent w/ household mobility w/ no AD, uses RW for community mobility, independent w/ bathing/dressing, still drives,  -AO     Existing Precautions/Restrictions no known precautions/restrictions  -AO     Barriers to Rehab none identified  -AO     Row Name 10/15/21 1430          Living Environment    Lives With spouse  -AO     Row Name 10/15/21 1430          Home Main Entrance    Number of Stairs, Main Entrance none  -AO     Row Name 10/15/21 1430          Stairs Within Home, Primary    Number of Stairs, Within Home, Primary none  -AO     Row Name 10/15/21 1430          Cognition    Orientation Status (Cognition) oriented x 4  -AO     Row Name 10/15/21 1430          Safety Issues, Functional Mobility    Impairments Affecting Function (Mobility) balance  -AO           User Key  (r) = Recorded By, (t) = Taken By, (c) = Cosigned By    Initials Name Provider Type    AO Dorota Hernandez, PT Physical Therapist               Mobility     Row Name 10/15/21 1430          Bed Mobility    Bed Mobility bed mobility (all) activities  -AO     All Activities, [place] (Bed Mobility) independent  -AO     Row Name 10/15/21 1430          Bed-Chair Transfer    Bed-Chair [place] (Transfers) modified independence  -AO     Assistive Device (Bed-Chair Transfers) walker, front-wheeled  -AO     Row Name  10/15/21 1430          Sit-Stand Transfer    Sit-Stand [place] (Transfers) modified independence  -AO     Assistive Device (Sit-Stand Transfers) walker, front-wheeled  -AO     Row Name 10/15/21 1430          Gait/Stairs (Locomotion)    [place] Level (Gait) supervision  -AO     Assistive Device (Gait) walker, front-wheeled  -AO     Distance in Feet (Gait) 300 ft  -AO     Deviations/Abnormal Patterns (Gait) gait speed decreased  -AO           User Key  (r) = Recorded By, (t) = Taken By, (c) = Cosigned By    Initials Name Provider Type    Dorota Reno, PT Physical Therapist               Obj/Interventions     Row Name 10/15/21 1430          Range of Motion Comprehensive    General Range of Motion no range of motion deficits identified  -AO     Row Name 10/15/21 1430          Strength Comprehensive (MMT)    General Manual Muscle Testing (MMT) Assessment no strength deficits identified  -AO     Row Name 10/15/21 1430          Balance    Balance Assessment sitting static balance; sitting dynamic balance; standing static balance; standing dynamic balance  -AO     Static Sitting Balance WFL  -AO     Dynamic Sitting Balance WFL  -AO     Static Standing Balance WFL  -AO     Dynamic Standing Balance WFL  -AO     Row Name 10/15/21 1430          Sensory Assessment (Somatosensory)    Sensory Assessment (Somatosensory) sensation intact  -AO           User Key  (r) = Recorded By, (t) = Taken By, (c) = Cosigned By    Initials Name Provider Type    Dorota Reno, PT Physical Therapist               Goals/Plan    No documentation.                Clinical Impression     Row Name 10/15/21 1430          Pain    Additional Documentation Pain Scale: Numbers Pre/Post-Treatment (Group)  -AO     Row Name 10/15/21 1430          Pain Scale: Numbers Pre/Post-Treatment    Pretreatment Pain Rating 0/10 - no pain  -AO     Posttreatment Pain Rating 0/10 - no pain  -AO     Row Name 10/15/21 1430          Plan of Care Review     Plan of Care Reviewed With patient  -AO     Progress no change  -AO     Outcome Summary Pt is a 75 y/o M who presented to [facility] w/ c/o intermittent chest pain over the past two weeks, requiring use of 1-2 nitroglycerin tablets to relieve, and with pain with exertion or ambulation. Pt had stress test earlier today and with plans for cardiac cath on Monday. Pt lives in a single level apartment with his wife and was independent w/ household mobility, used RW for community mobility, and independent w/ all ADLs. Pt appears to be functioning close to baseline with no signficant strenth, balance, or endurance deficits, demonstrates MOD I with bed mobility/transfers, and completes gait training 300 ft with RW reqiring supervision with no chest pain throughout session. Anticipate safe return home with wife with no further skilled physical therapy needs and will be discharged from PT services. PPE: gloves, mask, safety glasses  -AO     Row Name 10/15/21 1430          Therapy Assessment/Plan (PT)    Criteria for Skilled Interventions Met (PT) no; no problems identified which require skilled intervention  -AO     Row Name 10/15/21 1430          Vital Signs    Recovery Time Sp02 99% and  bpm s/p gait training bout  -AO     Row Name 10/15/21 1430          Positioning and Restraints    Pre-Treatment Position in bed  -AO     Post Treatment Position bed  -AO     In Bed sitting EOB; call light within reach  -AO           User Key  (r) = Recorded By, (t) = Taken By, (c) = Cosigned By    Initials Name Provider Type    Dorota Reno, PT Physical Therapist               Outcome Measures    No documentation.                              Physical Therapy Education                 Title: PT OT SLP Therapies (Done)     Topic: Physical Therapy (Done)     Point: Mobility training (Done)     Learning Progress Summary           Patient Acceptance, E,TB, VU by AO at 10/15/2021 1503                               User Key     Initials  Effective Dates Name Provider Type Discipline    AO 06/16/21 -  Dorota Hernandez, PT Physical Therapist PT              PT Recommendation and Plan     Plan of Care Reviewed With: patient  Progress: no change  Outcome Summary: Pt is a 75 y/o M who presented to [facility] w/ c/o intermittent chest pain over the past two weeks, requiring use of 1-2 nitroglycerin tablets to relieve, and with pain with exertion or ambulation. Pt had stress test earlier today and with plans for cardiac cath on Monday. Pt lives in a single level apartment with his wife and was independent w/ household mobility, used RW for community mobility, and independent w/ all ADLs. Pt appears to be functioning close to baseline with no signficant strenth, balance, or endurance deficits, demonstrates MOD I with bed mobility/transfers, and completes gait training 300 ft with RW reqiring supervision with no chest pain throughout session. Anticipate safe return home with wife with no further skilled physical therapy needs and will be discharged from PT services. PPE: gloves, mask, safety glasses     Time Calculation:    PT Charges     Row Name 10/15/21 1504             Time Calculation    Start Time 1430  -AO      Stop Time 1455  -AO      Time Calculation (min) 25 min  -AO      PT Received On 10/15/21  -AO              Time Calculation- PT    Total Timed Code Minutes- PT 0 minute(s)  -AO            User Key  (r) = Recorded By, (t) = Taken By, (c) = Cosigned By    Initials Name Provider Type    AO Dorota Hernandez, PT Physical Therapist              Therapy Charges for Today     Code Description Service Date Service Provider Modifiers Qty    54664102509 HC PT EVAL LOW COMPLEXITY 3 10/15/2021 Dorota Hernandez, PT GP 1               Dorota Hernandez PT  10/15/2021

## 2021-10-16 PROBLEM — I12.9 BENIGN HYPERTENSION WITH CHRONIC KIDNEY DISEASE: Status: ACTIVE | Noted: 2020-10-26

## 2021-10-16 PROBLEM — N18.9 CHRONIC RENAL INSUFFICIENCY: Status: ACTIVE | Noted: 2021-10-16

## 2021-10-16 PROBLEM — I38 CHF DUE TO VALVULAR DISEASE: Status: ACTIVE | Noted: 2021-03-01

## 2021-10-16 LAB
ANION GAP SERPL CALCULATED.3IONS-SCNC: 12 MMOL/L (ref 5–15)
BASOPHILS # BLD AUTO: 0.1 10*3/MM3 (ref 0–0.2)
BASOPHILS NFR BLD AUTO: 1.4 % (ref 0–1.5)
BUN SERPL-MCNC: 23 MG/DL (ref 8–23)
BUN/CREAT SERPL: 21.5 (ref 7–25)
CALCIUM SPEC-SCNC: 8.3 MG/DL (ref 8.6–10.5)
CHLORIDE SERPL-SCNC: 100 MMOL/L (ref 98–107)
CO2 SERPL-SCNC: 22 MMOL/L (ref 22–29)
CREAT SERPL-MCNC: 1.07 MG/DL (ref 0.76–1.27)
DEPRECATED RDW RBC AUTO: 54.7 FL (ref 37–54)
EOSINOPHIL # BLD AUTO: 0.2 10*3/MM3 (ref 0–0.4)
EOSINOPHIL NFR BLD AUTO: 3.2 % (ref 0.3–6.2)
ERYTHROCYTE [DISTWIDTH] IN BLOOD BY AUTOMATED COUNT: 18.7 % (ref 12.3–15.4)
GFR SERPL CREATININE-BSD FRML MDRD: 67 ML/MIN/1.73
GLUCOSE SERPL-MCNC: 164 MG/DL (ref 65–99)
HCT VFR BLD AUTO: 34.2 % (ref 37.5–51)
HGB BLD-MCNC: 11.7 G/DL (ref 13–17.7)
INR PPP: 1.01 (ref 0.93–1.1)
LYMPHOCYTES # BLD AUTO: 1.1 10*3/MM3 (ref 0.7–3.1)
LYMPHOCYTES NFR BLD AUTO: 19.9 % (ref 19.6–45.3)
MCH RBC QN AUTO: 29.2 PG (ref 26.6–33)
MCHC RBC AUTO-ENTMCNC: 34.4 G/DL (ref 31.5–35.7)
MCV RBC AUTO: 85 FL (ref 79–97)
MONOCYTES # BLD AUTO: 0.5 10*3/MM3 (ref 0.1–0.9)
MONOCYTES NFR BLD AUTO: 8.8 % (ref 5–12)
NEUTROPHILS NFR BLD AUTO: 3.7 10*3/MM3 (ref 1.7–7)
NEUTROPHILS NFR BLD AUTO: 66.7 % (ref 42.7–76)
NRBC BLD AUTO-RTO: 0.1 /100 WBC (ref 0–0.2)
PLATELET # BLD AUTO: 296 10*3/MM3 (ref 140–450)
PMV BLD AUTO: 8.4 FL (ref 6–12)
POTASSIUM SERPL-SCNC: 4.1 MMOL/L (ref 3.5–5.2)
PROTHROMBIN TIME: 11.2 SECONDS (ref 9.6–11.7)
QT INTERVAL: 450 MS
QT INTERVAL: 466 MS
RBC # BLD AUTO: 4.02 10*6/MM3 (ref 4.14–5.8)
SODIUM SERPL-SCNC: 134 MMOL/L (ref 136–145)
WBC # BLD AUTO: 5.6 10*3/MM3 (ref 3.4–10.8)

## 2021-10-16 PROCEDURE — 85610 PROTHROMBIN TIME: CPT | Performed by: INTERNAL MEDICINE

## 2021-10-16 PROCEDURE — 94799 UNLISTED PULMONARY SVC/PX: CPT

## 2021-10-16 PROCEDURE — 99214 OFFICE O/P EST MOD 30 MIN: CPT | Performed by: INTERNAL MEDICINE

## 2021-10-16 PROCEDURE — 25010000002 HYDROMORPHONE PER 4 MG: Performed by: FAMILY MEDICINE

## 2021-10-16 PROCEDURE — G0378 HOSPITAL OBSERVATION PER HR: HCPCS

## 2021-10-16 PROCEDURE — 80048 BASIC METABOLIC PNL TOTAL CA: CPT | Performed by: INTERNAL MEDICINE

## 2021-10-16 PROCEDURE — 85025 COMPLETE CBC W/AUTO DIFF WBC: CPT | Performed by: INTERNAL MEDICINE

## 2021-10-16 RX ADMIN — HYDROMORPHONE HYDROCHLORIDE 0.5 MG: 2 INJECTION INTRAMUSCULAR; INTRAVENOUS; SUBCUTANEOUS at 01:45

## 2021-10-16 RX ADMIN — MIRTAZAPINE 30 MG: 15 TABLET, FILM COATED ORAL at 20:04

## 2021-10-16 RX ADMIN — POTASSIUM CHLORIDE 10 MEQ: 750 TABLET, EXTENDED RELEASE ORAL at 12:30

## 2021-10-16 RX ADMIN — FUROSEMIDE 20 MG: 20 TABLET ORAL at 06:11

## 2021-10-16 RX ADMIN — PREGABALIN 150 MG: 75 CAPSULE ORAL at 09:47

## 2021-10-16 RX ADMIN — PREGABALIN 150 MG: 75 CAPSULE ORAL at 20:04

## 2021-10-16 RX ADMIN — HYDROMORPHONE HYDROCHLORIDE 0.5 MG: 2 INJECTION INTRAMUSCULAR; INTRAVENOUS; SUBCUTANEOUS at 20:05

## 2021-10-16 RX ADMIN — BUDESONIDE 0.5 MG: 0.5 SUSPENSION RESPIRATORY (INHALATION) at 20:24

## 2021-10-16 RX ADMIN — CLOPIDOGREL BISULFATE 75 MG: 75 TABLET ORAL at 06:11

## 2021-10-16 RX ADMIN — LOSARTAN POTASSIUM 50 MG: 50 TABLET, FILM COATED ORAL at 06:11

## 2021-10-16 RX ADMIN — POTASSIUM CHLORIDE 10 MEQ: 750 TABLET, EXTENDED RELEASE ORAL at 09:47

## 2021-10-16 RX ADMIN — HYDROMORPHONE HYDROCHLORIDE 0.5 MG: 2 INJECTION INTRAMUSCULAR; INTRAVENOUS; SUBCUTANEOUS at 12:53

## 2021-10-16 RX ADMIN — ASPIRIN 81 MG: 81 TABLET, COATED ORAL at 09:47

## 2021-10-16 RX ADMIN — FAMOTIDINE 40 MG: 20 TABLET ORAL at 09:47

## 2021-10-16 RX ADMIN — HYDROMORPHONE HYDROCHLORIDE 0.5 MG: 2 INJECTION INTRAMUSCULAR; INTRAVENOUS; SUBCUTANEOUS at 06:11

## 2021-10-16 RX ADMIN — POTASSIUM CHLORIDE 10 MEQ: 750 TABLET, EXTENDED RELEASE ORAL at 18:29

## 2021-10-16 RX ADMIN — BUDESONIDE 0.5 MG: 0.5 SUSPENSION RESPIRATORY (INHALATION) at 07:40

## 2021-10-16 RX ADMIN — HYDROMORPHONE HYDROCHLORIDE 0.5 MG: 2 INJECTION INTRAMUSCULAR; INTRAVENOUS; SUBCUTANEOUS at 16:52

## 2021-10-16 RX ADMIN — Medication 3 ML: at 09:48

## 2021-10-16 RX ADMIN — Medication 10 ML: at 23:05

## 2021-10-16 RX ADMIN — Medication 3 ML: at 20:04

## 2021-10-16 RX ADMIN — ROPINIROLE HYDROCHLORIDE 0.5 MG: 0.25 TABLET, FILM COATED ORAL at 20:04

## 2021-10-16 RX ADMIN — HYDROMORPHONE HYDROCHLORIDE 0.5 MG: 2 INJECTION INTRAMUSCULAR; INTRAVENOUS; SUBCUTANEOUS at 23:04

## 2021-10-16 RX ADMIN — HYDROMORPHONE HYDROCHLORIDE 0.5 MG: 2 INJECTION INTRAMUSCULAR; INTRAVENOUS; SUBCUTANEOUS at 09:48

## 2021-10-16 RX ADMIN — ATORVASTATIN CALCIUM 40 MG: 40 TABLET, FILM COATED ORAL at 06:11

## 2021-10-16 RX ADMIN — TRAZODONE HYDROCHLORIDE 100 MG: 100 TABLET ORAL at 20:04

## 2021-10-16 NOTE — PROGRESS NOTES
Referring Provider: Hospitalist    Reason for follow-up: Chest pain     Patient Care Team:  Yusuf Jones MD as PCP - General  Joshua Yip MD as Consulting Physician (Cardiology)  Moise Watson MD as Consulting Physician (Cardiac Electrophysiology)  Izzy Alvarez MD as Consulting Physician (Nephrology)  Celine Swanson MD as Consulting Physician (Cardiology)    Subjective .  Patient is currently stable without any chest pain or shortness of breath    Objective  In bed comfortably     Review of Systems   Constitutional: Negative for fever and malaise/fatigue.   Cardiovascular: Negative for chest pain, dyspnea on exertion and palpitations.   Respiratory: Negative for cough and shortness of breath.    Skin: Negative for rash.   Gastrointestinal: Negative for abdominal pain, nausea and vomiting.   Neurological: Negative for focal weakness and headaches.   All other systems reviewed and are negative.      Haloperidol, Morphine, Pantoprazole, and Latex    Scheduled Meds:aspirin, 81 mg, Oral, Daily  atorvastatin, 40 mg, Oral, QAM  budesonide, 0.5 mg, Nebulization, BID - RT  clopidogrel, 75 mg, Oral, QAM  famotidine, 40 mg, Oral, Daily  furosemide, 20 mg, Oral, QAM  losartan, 50 mg, Oral, QAM  mirtazapine, 30 mg, Oral, Nightly  potassium chloride, 10 mEq, Oral, TID With Meals  pregabalin, 150 mg, Oral, BID  rOPINIRole, 0.5 mg, Oral, Nightly  sodium chloride, 3 mL, Intravenous, Q12H  traZODone, 100 mg, Oral, Nightly      Continuous Infusions:   PRN Meds:.•  acetaminophen  •  HYDROmorphone  •  influenza vaccine  •  ipratropium-albuterol  •  nitroglycerin  •  ondansetron  •  oxyCODONE  •  [COMPLETED] Insert peripheral IV **AND** sodium chloride  •  sodium chloride        VITAL SIGNS  Vitals:    10/16/21 0306 10/16/21 0740 10/16/21 0944 10/16/21 1240   BP: 144/86  133/80 158/84   BP Location: Right arm  Right arm Right arm   Patient Position: Lying  Lying Lying   Pulse: 70 70 72 72   Resp:  "17 16 16 16   Temp: 97.5 °F (36.4 °C)  97.7 °F (36.5 °C) 97.9 °F (36.6 °C)   TempSrc: Oral  Oral Oral   SpO2: 97% 97% 99% 97%   Weight: 126 kg (277 lb 12.8 oz)      Height:           Flowsheet Rows      First Filed Value   Admission Height 182.9 cm (72\") Documented at 10/14/2021 1532   Admission Weight 131 kg (288 lb 12.8 oz) Documented at 10/14/2021 1532           TELEMETRY: Sinus rhythm    Physical Exam:  Constitutional:       Appearance: Well-developed.   Eyes:      General: No scleral icterus.     Conjunctiva/sclera: Conjunctivae normal.   HENT:      Head: Normocephalic and atraumatic.   Neck:      Vascular: No carotid bruit or JVD.   Pulmonary:      Effort: Pulmonary effort is normal.      Breath sounds: Normal breath sounds. No wheezing. No rales.   Cardiovascular:      Normal rate. Regular rhythm.   Pulses:     Intact distal pulses.   Abdominal:      General: Bowel sounds are normal.      Palpations: Abdomen is soft.   Musculoskeletal:      Cervical back: Normal range of motion and neck supple. Skin:     General: Skin is warm and dry.      Findings: No rash.   Neurological:      Mental Status: Alert.          Results Review:   I reviewed the patient's new clinical results.  Lab Results (last 24 hours)     Procedure Component Value Units Date/Time    Basic Metabolic Panel [[id number]]  (Abnormal) Collected: 10/16/21 0544    Specimen: Blood Updated: 10/16/21 0706     Glucose 164 mg/dL      BUN 23 mg/dL      Creatinine 1.07 mg/dL      Sodium 134 mmol/L      Potassium 4.1 mmol/L      Chloride 100 mmol/L      CO2 22.0 mmol/L      Calcium 8.3 mg/dL      eGFR Non African Amer 67 mL/min/1.73      BUN/Creatinine Ratio 21.5     Anion Gap 12.0 mmol/L     Narrative:      GFR Normal >60  Chronic Kidney Disease <60  Kidney Failure <15      Protime-INR [[id number]]  (Normal) Collected: 10/16/21 0544    Specimen: Blood Updated: 10/16/21 0649     Protime 11.2 Seconds      INR 1.01    CBC & Differential [[id number]]  (Abnormal) " Collected: 10/16/21 0544    Specimen: Blood Updated: 10/16/21 0638    Narrative:      The following orders were created for panel order CBC & Differential.  Procedure                               Abnormality         Status                     ---------                               -----------         ------                     CBC Auto Differential[[id number]]        Abnormal            Final result                 Please view results for these tests on the individual orders.    CBC Auto Differential [[id number]]  (Abnormal) Collected: 10/16/21 0544    Specimen: Blood Updated: 10/16/21 0638     WBC 5.60 10*3/mm3      RBC 4.02 10*6/mm3      Hemoglobin 11.7 g/dL      Hematocrit 34.2 %      MCV 85.0 fL      MCH 29.2 pg      MCHC 34.4 g/dL      RDW 18.7 %      RDW-SD 54.7 fl      MPV 8.4 fL      Platelets 296 10*3/mm3      Neutrophil % 66.7 %      Lymphocyte % 19.9 %      Monocyte % 8.8 %      Eosinophil % 3.2 %      Basophil % 1.4 %      Neutrophils, Absolute 3.70 10*3/mm3      Lymphocytes, Absolute 1.10 10*3/mm3      Monocytes, Absolute 0.50 10*3/mm3      Eosinophils, Absolute 0.20 10*3/mm3      Basophils, Absolute 0.10 10*3/mm3      nRBC 0.1 /100 WBC     Magnesium [[id number]]  (Normal) Collected: 10/15/21 0610    Specimen: Blood Updated: 10/15/21 1632     Magnesium 1.9 mg/dL           Imaging Results (Last 24 Hours)     ** No results found for the last 24 hours. **          EKG      I personally viewed and interpreted the patient's EKG/Telemetry data:    ECHOCARDIOGRAM:    STRESS MYOVIEW:    CARDIAC CATHETERIZATION:    OTHER:         Assessment/Plan     Active Problems:    Presence of coronary angioplasty implant and graft    AV block, complete (HCC)    Angina of effort (HCC)  Diabetes  Dyslipidemia  Hypertension  COPD  Chronic renal sufficiency  Atrial fibrillation  Cirrhosis  Peripheral vascular disease    Patient presented with chest pain and is ruled out for MI by EKG enzymes  Patient had a stress partially which  is abnormal  Patient will have a cardiac catheterization performed.  Patient is on Eliquis for atrial fibrillation which is held  Patient blood pressure heart rate stable  Patient's renal function is watched by the nephrologist and will be cleared for cardiac authorization by the nephrologist    I discussed the patients findings and my recommendations with patient and nurse    Blaine Norwood MD  10/16/21  12:53 EDT

## 2021-10-16 NOTE — PLAN OF CARE
"Goal Outcome Evaluation:  Plan of Care Reviewed With: patient        Progress: no change  Outcome Summary: pt continues to c/o backpain.  He states that the Isela does nothelp and thatt his homemedication works better.  He refuses to take the roxicodone and repeatedly requests the \"shot\" pain med which is Dilaudid.  Will continue with current order, care plans and monitoring.  "

## 2021-10-16 NOTE — PLAN OF CARE
Goal Outcome Evaluation:  Patient is pleasant. C/O pain throughout shift in lower back- tx with dilaudid Q3; wants pain meds around the clock. NPO @[place] tomorrow for heart cath on Monday. NO further complaints aside of pain. Will continue to monitor.

## 2021-10-16 NOTE — PROGRESS NOTES
LOS: 0 days   Patient Care Team:  [facility]usuf Jones MD as PCP - [facility]ing Phys[facility] (Cardiology)  Shirley, Moise Kathleen MD as Consulting Physician (Cardiac Electrophysiology)  Izzy Alvarez MD as Consulting Physician (Nephrology)  Celine Swanson MD as Consulting Physician (Cardiology)    Subjective     Interval History:    Patient Complaints:  No complaints at present.     History taken from: patient    Review of Systems   Constitutional: Negative for activity change, appetite change and fatigue.   HENT: Negative for trouble swallowing.    Respiratory: Negative for shortness of breath.    Cardiovascular: Negative for chest pain, palpitations and leg swelling.   Gastrointestinal: Negative for diarrhea, nausea and vomiting.   Genitourinary: Negative for difficulty urinating.   Musculoskeletal: Negative for gait problem.   Psychiatric/Behavioral: Negative for confusion.           Objective     Vital Signs  Temp:  [97.4 °F (36.3 °C)-97.9 °F (36.6 °C)] 97.9 °F (36.6 °C)  Heart Rate:  [70-75] 72  Resp:  [16-18] 16  BP: (133-158)/(79-86) 158/84    Physical Exam:     General Appearance:    Alert, cooperative, in no acute distress,   Head:    Normocephalic, without obvious abnormality, atraumatic   Eyes:            Lids and lashes normal, conjunctivae and sclerae normal, no   icterus, no pallor, corneas clear, PERRLA   Ears:    Ears appear intact with no abnormalities noted   Throat:   No oral lesions, no thrush, oral mucosa moist   Neck:   No adenopathy, supple, trachea midline, no thyromegaly, no   carotid bruit, no JVD   Lungs:     Clear to auscultation,respirations regular, even and                  unlabored    Heart:    Regular rhythm and normal rate, normal S1 and S2, no            murmur, no gallop, no rub, no click   Chest Wall:    No abnormalities observed   Abdomen:     Normal bowel sounds, no masses, no organomegaly, soft        Non-tender non-distended, no  guarding- chronic abd wound with no evidence of infection.    Extremities:   Moves all extremities well, no edema, no cyanosis, no             Redness   Pulses:   Pulses palpable and equal bilaterally   Skin:   No bleeding, bruising or rash   Lymph nodes:   No palpable adenopathy   Neurologic:   Cranial nerves 2 - 12 grossly intact, sensation intact, DTR       present and equal bilaterally        Results Review:    Lab Results (last 24 hours)     Procedure Component Value Units Date/Time    Basic Metabolic Panel [[id number]]  (Abnormal) Collected: 10/16/21 0544    Specimen: Blood Updated: 10/16/21 0706     Glucose 164 mg/dL      BUN 23 mg/dL      Creatinine 1.07 mg/dL      Sodium 134 mmol/L      Potassium 4.1 mmol/L      Chloride 100 mmol/L      CO2 22.0 mmol/L      Calcium 8.3 mg/dL      eGFR Non African Amer 67 mL/min/1.73      BUN/Creatinine Ratio 21.5     Anion Gap 12.0 mmol/L     Narrative:      GFR Normal >60  Chronic Kidney Disease <60  Kidney Failure <15      Protime-INR [[id number]]  (Normal) Collected: 10/16/21 0544    Specimen: Blood Updated: 10/16/21 0649     Protime 11.2 Seconds      INR 1.01    CBC & Differential [[id number]]  (Abnormal) Collected: 10/16/21 0544    Specimen: Blood Updated: 10/16/21 0638    Narrative:      The following orders were created for panel order CBC & Differential.  Procedure                               Abnormality         Status                     ---------                               -----------         ------                     CBC Auto Differential[[id number]]        Abnormal            Final result                 Please view results for these tests on the individual orders.    CBC Auto Differential [[id number]]  (Abnormal) Collected: 10/16/21 0544    Specimen: Blood Updated: 10/16/21 0638     WBC 5.60 10*3/mm3      RBC 4.02 10*6/mm3      Hemoglobin 11.7 g/dL      Hematocrit 34.2 %      MCV 85.0 fL      MCH 29.2 pg      MCHC 34.4 g/dL      RDW 18.7 %      RDW-SD 54.7 fl       MPV 8.4 fL      Platelets 296 10*3/mm3      Neutrophil % 66.7 %      Lymphocyte % 19.9 %      Monocyte % 8.8 %      Eosinophil % 3.2 %      Basophil % 1.4 %      Neutrophils, Absolute 3.70 10*3/mm3      Lymphocytes, Absolute 1.10 10*3/mm3      Monocytes, Absolute 0.50 10*3/mm3      Eosinophils, Absolute 0.20 10*3/mm3      Basophils, Absolute 0.10 10*3/mm3      nRBC 0.1 /100 WBC     Magnesium [[id number]]  (Normal) Collected: 10/15/21 0610    Specimen: Blood Updated: 10/15/21 1632     Magnesium 1.9 mg/dL            Imaging Results (Last 24 Hours)     ** No results found for the last 24 hours. **               I reviewed the patient's new clinical results.    Medication Review:   Scheduled Meds:aspirin, 81 mg, Oral, Daily  atorvastatin, 40 mg, Oral, QAM  budesonide, 0.5 mg, Nebulization, BID - RT  clopidogrel, 75 mg, Oral, QAM  famotidine, 40 mg, Oral, Daily  furosemide, 20 mg, Oral, QAM  losartan, 50 mg, Oral, QAM  mirtazapine, 30 mg, Oral, Nightly  potassium chloride, 10 mEq, Oral, TID With Meals  pregabalin, 150 mg, Oral, BID  rOPINIRole, 0.5 mg, Oral, Nightly  sodium chloride, 3 mL, Intravenous, Q12H  traZODone, 100 mg, Oral, Nightly      Continuous Infusions:   PRN Meds:.•  acetaminophen  •  HYDROmorphone  •  influenza vaccine  •  ipratropium-albuterol  •  nitroglycerin  •  ondansetron  •  oxyCODONE  •  [COMPLETED] Insert peripheral IV **AND** sodium chloride  •  sodium chloride     Assessment/Plan       Presence of coronary angioplasty implant and graft    AV block, complete (HCC)    Angina of effort (HCC)    CAD with abnormal stress test-   Pt to have cath on Monday - Dr. Norwood's note suggested  that the pt would be cleared for cardiac cath by nephrology . Will consult.     CHF- echo on 10/15/21- EF 55%. Home meds include lasix and potassium which have been continued here.      Afib - off eliquis for cath         HTN with CKD - cr at baseline. Lytes stable. Will consult nephrology .           Plan for  disposition: home when stable- pt to get Cath Monday .     Olga Pierce, APRN  10/16/21  14:03 EDT

## 2021-10-17 LAB
ANION GAP SERPL CALCULATED.3IONS-SCNC: 15 MMOL/L (ref 5–15)
BUN SERPL-MCNC: 22 MG/DL (ref 8–23)
BUN/CREAT SERPL: 21.8 (ref 7–25)
CALCIUM SPEC-SCNC: 8.6 MG/DL (ref 8.6–10.5)
CHLORIDE SERPL-SCNC: 96 MMOL/L (ref 98–107)
CO2 SERPL-SCNC: 23 MMOL/L (ref 22–29)
CREAT SERPL-MCNC: 1.01 MG/DL (ref 0.76–1.27)
DEPRECATED RDW RBC AUTO: 56.4 FL (ref 37–54)
ERYTHROCYTE [DISTWIDTH] IN BLOOD BY AUTOMATED COUNT: 19 % (ref 12.3–15.4)
GFR SERPL CREATININE-BSD FRML MDRD: 72 ML/MIN/1.73
GLUCOSE SERPL-MCNC: 216 MG/DL (ref 65–99)
HCT VFR BLD AUTO: 37.6 % (ref 37.5–51)
HGB BLD-MCNC: 12.8 G/DL (ref 13–17.7)
MCH RBC QN AUTO: 29.3 PG (ref 26.6–33)
MCHC RBC AUTO-ENTMCNC: 34 G/DL (ref 31.5–35.7)
MCV RBC AUTO: 86.2 FL (ref 79–97)
PLATELET # BLD AUTO: 350 10*3/MM3 (ref 140–450)
PMV BLD AUTO: 8.5 FL (ref 6–12)
POTASSIUM SERPL-SCNC: 4.3 MMOL/L (ref 3.5–5.2)
RBC # BLD AUTO: 4.36 10*6/MM3 (ref 4.14–5.8)
SODIUM SERPL-SCNC: 134 MMOL/L (ref 136–145)
WBC # BLD AUTO: 6.3 10*3/MM3 (ref 3.4–10.8)

## 2021-10-17 PROCEDURE — 94799 UNLISTED PULMONARY SVC/PX: CPT

## 2021-10-17 PROCEDURE — 85027 COMPLETE CBC AUTOMATED: CPT | Performed by: FAMILY MEDICINE

## 2021-10-17 PROCEDURE — G0378 HOSPITAL OBSERVATION PER HR: HCPCS

## 2021-10-17 PROCEDURE — 93010 ELECTROCARDIOGRAM REPORT: CPT | Performed by: INTERNAL MEDICINE

## 2021-10-17 PROCEDURE — 80048 BASIC METABOLIC PNL TOTAL CA: CPT | Performed by: FAMILY MEDICINE

## 2021-10-17 PROCEDURE — 93005 ELECTROCARDIOGRAM TRACING: CPT | Performed by: FAMILY MEDICINE

## 2021-10-17 PROCEDURE — 94760 N-INVAS EAR/PLS OXIMETRY 1: CPT

## 2021-10-17 PROCEDURE — 25010000002 HYDROMORPHONE PER 4 MG: Performed by: FAMILY MEDICINE

## 2021-10-17 PROCEDURE — 99214 OFFICE O/P EST MOD 30 MIN: CPT | Performed by: INTERNAL MEDICINE

## 2021-10-17 RX ADMIN — ASPIRIN 81 MG: 81 TABLET, COATED ORAL at 09:17

## 2021-10-17 RX ADMIN — HYDROMORPHONE HYDROCHLORIDE 0.5 MG: 2 INJECTION INTRAMUSCULAR; INTRAVENOUS; SUBCUTANEOUS at 23:24

## 2021-10-17 RX ADMIN — IPRATROPIUM BROMIDE AND ALBUTEROL SULFATE 3 ML: 2.5; .5 SOLUTION RESPIRATORY (INHALATION) at 18:37

## 2021-10-17 RX ADMIN — IPRATROPIUM BROMIDE AND ALBUTEROL SULFATE 3 ML: 2.5; .5 SOLUTION RESPIRATORY (INHALATION) at 16:34

## 2021-10-17 RX ADMIN — LOSARTAN POTASSIUM 50 MG: 50 TABLET, FILM COATED ORAL at 05:43

## 2021-10-17 RX ADMIN — BUDESONIDE 0.5 MG: 0.5 SUSPENSION RESPIRATORY (INHALATION) at 18:42

## 2021-10-17 RX ADMIN — POTASSIUM CHLORIDE 10 MEQ: 750 TABLET, EXTENDED RELEASE ORAL at 12:18

## 2021-10-17 RX ADMIN — HYDROMORPHONE HYDROCHLORIDE 0.5 MG: 2 INJECTION INTRAMUSCULAR; INTRAVENOUS; SUBCUTANEOUS at 15:56

## 2021-10-17 RX ADMIN — HYDROMORPHONE HYDROCHLORIDE 0.5 MG: 2 INJECTION INTRAMUSCULAR; INTRAVENOUS; SUBCUTANEOUS at 02:18

## 2021-10-17 RX ADMIN — ATORVASTATIN CALCIUM 40 MG: 40 TABLET, FILM COATED ORAL at 05:42

## 2021-10-17 RX ADMIN — HYDROMORPHONE HYDROCHLORIDE 0.5 MG: 2 INJECTION INTRAMUSCULAR; INTRAVENOUS; SUBCUTANEOUS at 05:42

## 2021-10-17 RX ADMIN — FUROSEMIDE 20 MG: 20 TABLET ORAL at 05:44

## 2021-10-17 RX ADMIN — ROPINIROLE HYDROCHLORIDE 0.5 MG: 0.25 TABLET, FILM COATED ORAL at 20:20

## 2021-10-17 RX ADMIN — HYDROMORPHONE HYDROCHLORIDE 0.5 MG: 2 INJECTION INTRAMUSCULAR; INTRAVENOUS; SUBCUTANEOUS at 12:18

## 2021-10-17 RX ADMIN — POTASSIUM CHLORIDE 10 MEQ: 750 TABLET, EXTENDED RELEASE ORAL at 09:17

## 2021-10-17 RX ADMIN — NITROGLYCERIN 0.4 MG: 0.4 TABLET SUBLINGUAL at 21:18

## 2021-10-17 RX ADMIN — Medication 3 ML: at 09:18

## 2021-10-17 RX ADMIN — POTASSIUM CHLORIDE 10 MEQ: 750 TABLET, EXTENDED RELEASE ORAL at 18:06

## 2021-10-17 RX ADMIN — Medication 3 ML: at 19:21

## 2021-10-17 RX ADMIN — HYDROMORPHONE HYDROCHLORIDE 0.5 MG: 2 INJECTION INTRAMUSCULAR; INTRAVENOUS; SUBCUTANEOUS at 19:21

## 2021-10-17 RX ADMIN — TRAZODONE HYDROCHLORIDE 100 MG: 100 TABLET ORAL at 20:20

## 2021-10-17 RX ADMIN — HYDROMORPHONE HYDROCHLORIDE 0.5 MG: 2 INJECTION INTRAMUSCULAR; INTRAVENOUS; SUBCUTANEOUS at 09:17

## 2021-10-17 RX ADMIN — FAMOTIDINE 40 MG: 20 TABLET ORAL at 09:18

## 2021-10-17 RX ADMIN — PREGABALIN 150 MG: 75 CAPSULE ORAL at 20:20

## 2021-10-17 RX ADMIN — MIRTAZAPINE 30 MG: 15 TABLET, FILM COATED ORAL at 20:20

## 2021-10-17 RX ADMIN — Medication 10 ML: at 05:44

## 2021-10-17 RX ADMIN — PREGABALIN 150 MG: 75 CAPSULE ORAL at 09:17

## 2021-10-17 RX ADMIN — BUDESONIDE 0.5 MG: 0.5 SUSPENSION RESPIRATORY (INHALATION) at 06:35

## 2021-10-17 NOTE — PLAN OF CARE
Goal Outcome Evaluation:  Plan of Care Reviewed With: patient        Progress: no change  Outcome Summary: Pt is resting in bed. Pt shows no s/s of distress and vitals are stable. Pt voiced concerns of pain, PRN medication given. Call light within reach. Will continue to monitor and treat as appropriate.

## 2021-10-17 NOTE — PROGRESS NOTES
LOS: 0 days   Patient Care Team:  Yusuf Jones MD as PCP - Joshua Evangelista MD as Consulting Physician (Cardiology)  Moise Watson MD as Consulting Physician (Cardiac Electrophysiology)  Izzy Alvarez MD as Consulting Physician (Nephrology)  Celine Swanson MD as Consulting Physician (Cardiology)    Subjective     Interval History:    Patient Complaints: Fells ok - awaiting cath - Some Soa which he reports today that has not changed since admission - NO CP     History taken from: patient    Review of Systems   Constitutional: Negative for activity change, appetite change and fatigue.   HENT: Negative for trouble swallowing.    Respiratory: Positive for shortness of breath (some intermittent no change since admission ).    Cardiovascular: Negative for chest pain.   Gastrointestinal: Negative for abdominal pain, constipation, diarrhea, nausea and vomiting.   Genitourinary: Negative for difficulty urinating.   Musculoskeletal: Negative for gait problem.   Neurological: Negative for headaches.   Psychiatric/Behavioral: Negative for confusion.           Objective     Vital Signs  Temp:  [97.2 °F (36.2 °C)-97.9 °F (36.6 °C)] 97.2 °F (36.2 °C)  Heart Rate:  [70-88] 72  Resp:  [16-23] 17  BP: (123-163)/(70-88) 130/71    Physical Exam:     General Appearance:    Alert, cooperative, in no acute distress,   Head:    Normocephalic, without obvious abnormality, atraumatic   Eyes:            Lids and lashes normal, conjunctivae and sclerae normal, no   icterus, no pallor, corneas clear, PERRLA   Ears:    Ears appear intact with no abnormalities noted   Throat:   No oral lesions, no thrush, oral mucosa moist   Neck:   No adenopathy, supple, trachea midline, no thyromegaly, no   carotid bruit, no JVD   Lungs:     Clear to auscultation,respirations regular, even and                  unlabored    Heart:    Regular rhythm and normal rate, normal S1 and S2, no            murmur, no gallop,  no rub, no click   Chest Wall:    No abnormalities observed   Abdomen:      Obese- Normal bowel sounds, no masses, no organomegaly, soft        Non-tender non-distended, no guarding, chronic wound    Extremities:   Moves all extremities well, no edema, no cyanosis, no             Redness   Pulses:   Pulses palpable and equal bilaterally   Skin:   No bleeding, bruising or rash   Lymph nodes:   No palpable adenopathy   Neurologic:   Cranial nerves 2 - 12 grossly intact, sensation intact, DTR       present and equal bilaterally        Results Review:    Lab Results (last 24 hours)     ** No results found for the last 24 hours. **           Imaging Results (Last 24 Hours)     ** No results found for the last 24 hours. **               I reviewed the patient's new clinical results.    Medication Review:   Scheduled Meds:aspirin, 81 mg, Oral, Daily  atorvastatin, 40 mg, Oral, QAM  budesonide, 0.5 mg, Nebulization, BID - RT  clopidogrel, 75 mg, Oral, QAM  famotidine, 40 mg, Oral, Daily  furosemide, 20 mg, Oral, QAM  losartan, 50 mg, Oral, QAM  mirtazapine, 30 mg, Oral, Nightly  potassium chloride, 10 mEq, Oral, TID With Meals  pregabalin, 150 mg, Oral, BID  rOPINIRole, 0.5 mg, Oral, Nightly  sodium chloride, 3 mL, Intravenous, Q12H  traZODone, 100 mg, Oral, Nightly      Continuous Infusions:   PRN Meds:.•  acetaminophen  •  HYDROmorphone  •  influenza vaccine  •  ipratropium-albuterol  •  nitroglycerin  •  ondansetron  •  oxyCODONE  •  [COMPLETED] Insert peripheral IV **AND** sodium chloride  •  sodium chloride     Assessment/Plan       Atrial fibrillation (HCC)    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 Norwood's note suggested  that the pt would be cleared for cardiac cath  by nephrology . Consult placed yesterday.     CHF- echo on 10/15/21- EF 55%. Home meds include lasix and potassium which have been continued here.       Afib - off eliquis for cath         HTN with CKD - cr at baseline. Lytes stable. Will consult nephrology .       Plan for disposition:  Home -pending  cath tomorrow     Olga Pierce, KOURTNEY  10/17/21  12:21 EDT

## 2021-10-17 NOTE — CONSULTS
NEPHROLOGY CONSULTATION-----[facility]    [facility]  [phone]  Leonel Camilo MD    Patient Care Team:  Yusuf Jones MD as PCP - General  Joshua Yip MD as Consulting Physician (Cardiology)  Moise Watson MD as Consulting Physician (Cardiac Electrophysiology)  Izzy Alvarez MD as Consulting Physician (Nephrology)  Celine Swanson MD as Consulting Physician (Cardiology)    CC/REASON FOR CONSULTATION: CKD, patient in need of heart cath    PHYSICIAN REQUESTING CONSULTATION: Dr. Jones    History of Present Illness    76-year-old male with past medical history of hypertension, congestive heart failure, diabetes presented with several days history of chest pain. His creatinine is 1.07. His followed in office for CKD. Denies any dysuria or gross hematuria. Has had some dyspnea as well. No fever no cough. He had some fluid retention with generalized edema but that has improved since admission. He has been seen by cardiologist and heart cath is planned for tomorrow morning.    Review of Systems   As noted above otherwise 10 systems reviewed and were negative.      Past Medical History:   Diagnosis Date   • Alcoholic cirrhosis of liver with ascites ([facility]) 10/26/2020   • Benign hypertension with CKD (chronic kidney disease) stage III ([facility]) 10/26/2020   • Bruises easily    • CHF (congestive heart failure) ([facility])    • Chronic bronchitis with COPD (chronic obstructive pulmonary disease) ([facility]) 10/26/2020   • Chronic respiratory failure with hypoxia ([facility]) 10/26/2020   • Congenital heart defect    • Difficulty attaining erection    • Hypertension    • Low back pain    • Pacemaker    • Peripheral vascular disease due to secondary diabetes ([facility]) 10/26/2020   • Poor circulation    • Prostate cancer ([facility])     prostate   • Shortness of breath    • Sleep apnea     using CPAP    • Stage 3a chronic kidney disease ([facility]) 10/26/2020   •  Stented coronary artery 12/05/2019    MICHAEL to LAD   • Type 2 diabetes, controlled, with neuropathy ([facility]) 10/26/2020    no meds   • Type 2 diabetes, controlled, with neuropathy ([facility]) 10/26/2020       Past Surgical History:   Procedure Laterality Date   • ABDOMINAL SURGERY     • APPENDECTOMY     • BONE CURETTAGE Right 1/22/2021    Procedure: Wound excision with fifth metatarsal head resection, right foot.;  Surgeon: Josef Egan DPM;  Location: [facility] MAIN OR;  Service: Podiatry;  Laterality: Right;   • BONE INCISION AND DRAINAGE Right 10/5/2020    Procedure: Incision and drainage with debridement of all nonviable soft tissue and bone with bone biopsy, right foot.;  Surgeon: Josef Egan DPM;  Location: [facility] MAIN OR;  Service: Podiatry;  Laterality: Right;   • CARDIAC CATHETERIZATION N/A 12/5/2019    Procedure: Left Heart Cath;  Surgeon: Mirza Munson MD;  Location: [facility] CATH INVASIVE LOCATION;  Service: Cardiology   • CARDIAC CATHETERIZATION N/A 12/5/2019    Procedure: Stent MICHAEL coronary;  Surgeon: Mizra Munson MD;  Location: [facility] CATH INVASIVE LOCATION;  Service: Cardiology   • CHOLECYSTECTOMY     • ELBOW PROCEDURE      left   • FOOT SURGERY      right foot   • HERNIA REPAIR     • INCISION AND DRAINAGE OF WOUND Right 4/6/2021    Procedure: INCISION AND DRAINAGE OF RIGHT FOOT 5TH METATARSAL TO BONE AND PARTIAL 5TH METATARSAL RESECTION;  Surgeon: Josef Egan DPM;  Location: [facility] MAIN OR;  Service: Podiatry;  Laterality: Right;   • INCISION AND DRAINAGE OF WOUND Right 4/8/2021    Procedure: INCISION AND DRAINAGE BONE, DELAYED PRIMARY CLOSURE;  Surgeon: Josef Egan DPM;  Location: [facility] MAIN OR;  Service: Podiatry;  Laterality: Right;   • INTERVENTIONAL RADIOLOGY PROCEDURE N/A 12/5/2019    Procedure: Intravascular Ultrasound;  Surgeon: Mirza Munson MD;  Location: [facility] CATH INVASIVE LOCATION;  Service: Cardiology   • PACEMAKER IMPLANTATION     •  SD RT/LT HEART CATHETERS N/A 2019    Procedure: Percutaneous Coronary Intervention;  Surgeon: Mirza Munson MD;  Location: [facility] CATH INVASIVE LOCATION;  Service: Cardiology   • WOUND DEBRIDEMENT Right 10/29/2020    Procedure: Preparation and debridement of foot wound with application of Integra wound graft, right foot.;  Surgeon: Josef Egan DPM;  Location: [facility] MAIN OR;  Service: Podiatry;  Laterality: Right;   • WOUND DEBRIDEMENT N/A 2021    Procedure: EXCISIONAL ABDOMINAL WOUND  DEBRIDEMENT;  Surgeon: Cleopatra Wang MD;  Location: [facility] MAIN OR;  Service: General;  Laterality: N/A;   • WOUND DEBRIDEMENT N/A 2021    Procedure: DEBRIDEMENT OF ABDOMINAL WALL;  Surgeon: Hill Bhatia DO;  Location: [facility] MAIN OR;  Service: General;  Laterality: N/A;       Family History   Problem Relation Age of Onset   • Alzheimer's disease Mother    • GI problems Father    • Heart disease Father        Social History     Tobacco Use   • Smoking status: Former Smoker     Years: 15.00     Types: Cigarettes     Start date: 1966     Quit date: 5/10/2020     Years since quittin.4   • Smokeless tobacco: Never Used   Vaping Use   • Vaping Use: Never used   Substance Use Topics   • Alcohol use: Yes     Alcohol/week: 4.0 standard drinks     Types: 4 Shots of liquor per week     Comment: maybe 4 shots per month   • Drug use: No       Home Meds:   Medications Prior to Admission   Medication Sig Dispense Refill Last Dose   • apixaban (ELIQUIS) 5 MG tablet tablet Take 5 mg by mouth 2 (Two) Times a Day.   10/14/2021 at Unknown time   • aspirin 81 MG EC tablet Take 1 tablet by mouth Daily. 30 tablet 5 10/14/2021 at Unknown time   • atorvastatin (LIPITOR) 40 MG tablet Take 40 mg by mouth Every Morning.   10/14/2021 at Unknown time   • budesonide (PULMICORT) 0.5 MG/2ML nebulizer solution Take 0.5 mg by nebulization 2 (Two) Times a Day As Needed.   10/14/2021 at Unknown time   • clopidogrel (PLAVIX)  75 MG tablet Take 75 mg by mouth Every Morning.   10/14/2021 at Unknown time   • furosemide (LASIX) 20 MG tablet Take 20 mg by mouth Every Morning.   10/14/2021 at Unknown time   • ipratropium-albuterol (DUO-NEB) 0.5-2.5 mg/3 ml nebulizer 3 mL 4 (Four) Times a Day As Needed.   10/14/2021 at Unknown time   • losartan (COZAAR) 50 MG tablet Take 50 mg by mouth Every Morning.   10/14/2021 at Unknown time   • metoprolol tartrate (LOPRESSOR) 25 MG tablet Take 25 mg by mouth 2 (Two) Times a Day.   10/14/2021 at Unknown time   • nitroglycerin (NITROSTAT) 0.4 MG SL tablet Place 0.4 mg under the tongue Every 5 (Five) Minutes As Needed for Chest Pain.   10/14/2021 at Unknown time   • Perforomist 20 MCG/2ML nebulizer solution 20 mcg 2 (Two) Times a Day As Needed.   10/14/2021 at Unknown time   • potassium chloride (K-DUR) 10 MEQ CR tablet Take 10 mEq by mouth 3 (Three) Times a Day With Meals.   10/14/2021 at Unknown time   • pregabalin (LYRICA) 150 MG capsule TAKE ONE CAPSULE BY MOUTH TWICE A DAY 60 capsule 2 10/14/2021 at Unknown time   • rOPINIRole (REQUIP) 0.5 MG tablet Take 0.5 mg by mouth Every Night.   10/13/2021 at Unknown time   • VENTOLIN  (90 Base) MCG/ACT inhaler Inhale 2 puffs Every 6 (Six) Hours As Needed for Wheezing or Shortness of Air.   Past Month at Unknown time   • miconazole (MICOTIN) 2 % cream Apply 1 application topically to the appropriate area as directed Every 12 (Twelve) Hours. 60 g 1 Unknown at Unknown time   • mirtazapine (REMERON) 30 MG tablet Take 30 mg by mouth Every Night.   Unknown at Unknown time   • oxyCODONE-acetaminophen (Percocet)  MG per tablet Take 1 tablet by mouth Every 6 (Six) Hours As Needed for Moderate Pain . 120 tablet 0        Scheduled Meds:  aspirin, 81 mg, Oral, Daily  atorvastatin, 40 mg, Oral, QAM  budesonide, 0.5 mg, Nebulization, BID - RT  clopidogrel, 75 mg, Oral, QAM  famotidine, 40 mg, Oral, Daily  furosemide, 20 mg, Oral, QAM  losartan, 50 mg, Oral,  QAM  mirtazapine, 30 mg, Oral, Nightly  potassium chloride, 10 mEq, Oral, TID With Meals  pregabalin, 150 mg, Oral, BID  rOPINIRole, 0.5 mg, Oral, Nightly  sodium chloride, 3 mL, Intravenous, Q12H  traZODone, 100 mg, Oral, Nightly        Continuous Infusions:       PRN Meds:  •  acetaminophen  •  HYDROmorphone  •  influenza vaccine  •  ipratropium-albuterol  •  nitroglycerin  •  ondansetron  •  oxyCODONE  •  [COMPLETED] Insert peripheral IV **AND** sodium chloride  •  sodium chloride    Allergies:  Haloperidol, Morphine, Pantoprazole, and Latex    OBJECTIVE    Vital Signs  Temp:  [97.2 °F (36.2 °C)-97.9 °F (36.6 °C)] 97.2 °F (36.2 °C)  Heart Rate:  [70-88] 72  Resp:  [17-23] 17  BP: (123-163)/(70-88) 130/71    I/O this shift:  In: 240 [P.O.:240]  Out: -   I/O last 3 completed shifts:  In: 960 [P.O.:960]  Out: -     Physical Exam:  General Appearance: alert, appears stated age and cooperative  Head: normocephalic, without obvious abnormality and atraumatic  Eyes: conjunctivae and sclerae normal and no icterus  Neck: supple and no JVD  Lungs: Bilateral crackles present  Heart: regular rhythm & normal rate and normal S1, S2  Chest Wall: no abnormalities observed  Abdomen: normal bowel sounds and soft non-tender  Extremities: moves extremities well, trace edema, no cyanosis  Skin: no bleeding, bruising or rash  Neurologic: Alert, and oriented. No focal deficits    Results Review:    I reviewed the patient's new clinical results.    WBC WBC   Date Value Ref Range Status   10/16/2021 5.60 3.40 - 10.80 10*3/mm3 Final   10/15/2021 7.90 3.40 - 10.80 10*3/mm3 Final   10/14/2021 8.10 3.40 - 10.80 10*3/mm3 Final      HGB Hemoglobin   Date Value Ref Range Status   10/16/2021 11.7 (L) 13.0 - 17.7 g/dL Final   10/15/2021 12.2 (L) 13.0 - 17.7 g/dL Final   10/14/2021 12.1 (L) 13.0 - 17.7 g/dL Final      HCT Hematocrit   Date Value Ref Range Status   10/16/2021 34.2 (L) 37.5 - 51.0 % Final   10/15/2021 35.2 (L) 37.5 - 51.0 % Final    10/14/2021 35.1 (L) 37.5 - 51.0 % Final      Platlets No results found for: LABPLAT   MCV MCV   Date Value Ref Range Status   10/16/2021 85.0 79.0 - 97.0 fL Final   10/15/2021 82.9 79.0 - 97.0 fL Final   10/14/2021 83.5 79.0 - 97.0 fL Final          Sodium Sodium   Date Value Ref Range Status   10/16/2021 134 (L) 136 - 145 mmol/L Final   10/15/2021 138 136 - 145 mmol/L Final   10/14/2021 135 (L) 136 - 145 mmol/L Final      Potassium Potassium   Date Value Ref Range Status   10/16/2021 4.1 3.5 - 5.2 mmol/L Final   10/15/2021 4.3 3.5 - 5.2 mmol/L Final   10/14/2021 4.6 3.5 - 5.2 mmol/L Final      Chloride Chloride   Date Value Ref Range Status   10/16/2021 100 98 - 107 mmol/L Final   10/15/2021 100 98 - 107 mmol/L Final   10/14/2021 99 98 - 107 mmol/L Final      CO2 CO2   Date Value Ref Range Status   10/16/2021 22.0 22.0 - 29.0 mmol/L Final   10/15/2021 25.0 22.0 - 29.0 mmol/L Final   10/14/2021 24.0 22.0 - 29.0 mmol/L Final      BUN BUN   Date Value Ref Range Status   10/16/2021 23 8 - 23 mg/dL Final   10/15/2021 19 8 - 23 mg/dL Final   10/14/2021 21 8 - 23 mg/dL Final      Creatinine Creatinine   Date Value Ref Range Status   10/16/2021 1.07 0.76 - 1.27 mg/dL Final   10/15/2021 1.09 0.76 - 1.27 mg/dL Final   10/14/2021 1.07 0.76 - 1.27 mg/dL Final      Calcium Calcium   Date Value Ref Range Status   10/16/2021 8.3 (L) 8.6 - 10.5 mg/dL Final   10/15/2021 9.1 8.6 - 10.5 mg/dL Final   10/14/2021 8.8 8.6 - 10.5 mg/dL Final      PO4 No results found for: CAPO4   Albumin Albumin   Date Value Ref Range Status   10/14/2021 3.70 3.50 - 5.20 g/dL Final      Magnesium Magnesium   Date Value Ref Range Status   10/15/2021 1.9 1.6 - 2.4 mg/dL Final      Uric Acid No results found for: URICACID       Imaging Results (Last 72 Hours)     Procedure Component Value Units Date/Time    XR Chest 2 View [[id number]] Collected: 10/14/21 1632     Updated: 10/14/21 1635    Narrative:      DATE OF EXAM:  10/14/2021 4:29 PM      PROCEDURE:  XR CHEST 2 VW-     INDICATIONS:  Chest pain.       COMPARISON:  7/23/2021.     TECHNIQUE:   Two radiologic views of the chest.     FINDINGS:  The heart is borderline enlarged. The pulmonary vascular markings are  normal. The lungs and pleural spaces are clear of active disease. There  is a left-sided transvenous pacemaker in place.  There is degenerative  spondylosis of the thoracic spine.       Impression:         1. Borderline cardiomegaly.  2. No active pulmonary disease.     Electronically Signed By-Yusuf Gray MD On:10/14/2021 4:33 PM  This report was finalized on [id number] by  Yusuf Gray MD.            Results for orders placed during the hospital encounter of 10/14/21    XR Chest 2 View    Narrative  DATE OF EXAM:  10/14/2021 4:29 PM    PROCEDURE:  XR CHEST 2 VW-    INDICATIONS:  Chest pain.    COMPARISON:  7/23/2021.    TECHNIQUE:  Two radiologic views of the chest.    FINDINGS:  The heart is borderline enlarged. The pulmonary vascular markings are  normal. The lungs and pleural spaces are clear of active disease. There  is a left-sided transvenous pacemaker in place.  There is degenerative  spondylosis of the thoracic spine.    Impression  1. Borderline cardiomegaly.  2. No active pulmonary disease.    Electronically Signed By-Yusuf Gray MD On:10/14/2021 4:33 PM  This report was finalized on [id number] by  Yusuf Gray MD.      Results for orders placed during the hospital encounter of 07/16/21    XR Chest 1 View    Narrative  Examination: XR CHEST 1 VW-    Date of Exam: 7/23/2021 8:25 PM    Indication: picc tip placement; A41.9-Sepsis, unspecified organism;  R41.0-Disorientation, unspecified.    Comparison: 07/16/2021    Technique: 1 view of the chest    FINDINGS:  There is a right-sided PICC line with its tip in the superior vena cava.  There is a bipolar transvenous pacer unchanged. Heart size appears  mildly enlarged. There is no vascular congestion, airspace opacity, or  pleural  effusion noted. Mediastinal contours are stable. No bone  abnormality.    Impression  1. PICC line tip is in the superior vena cava.  2. Mild cardiomegaly. No evidence of active lung disease.    Electronically Signed By-Judith Marie MD On:7/23/2021 9:30 PM  This report was finalized on [id number] by  Judith Marie MD.      XR Foot 3+ View Right    Narrative  DATE OF EXAM:  7/17/2021 4:10 PM    PROCEDURE:  XR FOOT 3+ VW RIGHT-    INDICATIONS:  Right foot wound at the lateral aspect near the fifth metatarsal.  History of diabetes and right foot surgery due to prior infection.    COMPARISON:  04/05/2021    TECHNIQUE:  A minimum of three routine standard radiographic views were obtained of  the right foot.    FINDINGS:  There has been amputation of the fifth metatarsal at the level of the  mid metatarsal. There appears to have been partial amputation of the  remaining distal fifth metatarsal since the prior study.    There appears to be a soft tissue wound at the lateral aspect of the  foot in the area of the resected distal fifth metatarsal. No definite  acute osseous abnormality is seen at the remaining fifth digit compared  to the prior study.  There is soft tissue edema. No significant soft  tissue gas is seen. There are hammertoe deformities. Degenerative  changes are present in the mid and forefoot, as before. No acute  malalignment or displaced fracture is seen. There is enthesophyte  formation at the calcaneus.    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  2.Lateral soft tissue wound with soft tissue edema which may indicate  skin and soft tissue infection. No significant soft tissue gas is seen.  3.No definite radiographic evidence of acute osseous abnormality.    Electronically Signed By-Joshua Boucher MD On:7/17/2021 5:14 PM  This report was finalized on [id number] by  Joshua Boucher MD.        Results for orders placed during the hospital encounter of  10/02/20    Doppler Ankle Brachial Index Single Level CAR    Interpretation Summary  · Right Conclusion: The right RASHARD is normal. Normal digital pressures.  · Left Conclusion: The left RASHARD is normal. Normal digital pressures.      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 Baseline creatinine 1.05-1.2  · Hypertension  · Type 2 diabetes  · Fluid overload  · Chest pain  · History congestive heart failure    Patient renal function is close to baseline. Renal wise there is no objection to heart cath tomorrow morning. We will start him on some p.o. Mucomyst.  The moment he has excess volume for that reason we will give him IV Lasix today.  Prior to cath we will gently hydrate.  Monitor renal function fluid status electrolytes      I discussed the patients findings and my recommendations with patient and consulting provider    Will follow along closely. Thank you for allowing us to see this patient in renal consultation.    [facility]  [phone]  MD Leonel Van MD  10/17/21  12:54 EDT

## 2021-10-17 NOTE — PLAN OF CARE
Goal Outcome Evaluation:  Patient is pleasant. NPO @MN for heart cath tomorrow. C/O lower back pain throughout shift- tx with Dilaudid Q3. Dressing change to the abdominal area- optifoam AG, abd x1, foam tape cleansed with normal saline. No further complaints. Will continue to [facility].

## 2021-10-17 NOTE — PROGRESS NOTES
Referring Provider: Hospitalist    Reason for follow-up: Chest pain     Patient Care Team:  Yusuf Jones MD as PCP - General  Joshua Yip MD as Consulting Physician (Cardiology)  Moise Watson MD as Consulting Physician (Cardiac Electrophysiology)  Izzy Alvarez MD as Consulting Physician (Nephrology)  Celine Swanson MD as Consulting Physician (Cardiology)    Subjective .  Patient is currently stable without any chest pain or shortness of breath    Objective  In bed comfortably     Review of Systems   Constitutional: Negative for fever and malaise/fatigue.   Cardiovascular: Negative for chest pain, dyspnea on exertion and palpitations.   Respiratory: Negative for cough and shortness of breath.    Skin: Negative for rash.   Gastrointestinal: Negative for abdominal pain, nausea and vomiting.   Neurological: Negative for focal weakness and headaches.   All other systems reviewed and are negative.      Haloperidol, Morphine, Pantoprazole, and Latex    Scheduled Meds:aspirin, 81 mg, Oral, Daily  atorvastatin, 40 mg, Oral, QAM  budesonide, 0.5 mg, Nebulization, BID - RT  clopidogrel, 75 mg, Oral, QAM  famotidine, 40 mg, Oral, Daily  furosemide, 20 mg, Oral, QAM  losartan, 50 mg, Oral, QAM  mirtazapine, 30 mg, Oral, Nightly  potassium chloride, 10 mEq, Oral, TID With Meals  pregabalin, 150 mg, Oral, BID  rOPINIRole, 0.5 mg, Oral, Nightly  sodium chloride, 3 mL, Intravenous, Q12H  traZODone, 100 mg, Oral, Nightly      Continuous Infusions:   PRN Meds:.•  acetaminophen  •  HYDROmorphone  •  influenza vaccine  •  ipratropium-albuterol  •  nitroglycerin  •  ondansetron  •  oxyCODONE  •  [COMPLETED] Insert peripheral IV **AND** sodium chloride  •  sodium chloride        VITAL SIGNS  Vitals:    10/17/21 0352 10/17/21 0542 10/17/21 0635 10/17/21 0916   BP: 127/77 138/88  130/71   BP Location: Left arm   Left arm   Patient Position: Lying   Sitting   Pulse: 80 86 88 72   Resp: 19  18 17  "  Temp: 97.9 °F (36.6 °C)   97.2 °F (36.2 °C)   TempSrc: Oral   Oral   SpO2: 96%  96% 96%   Weight: 126 kg (277 lb 8 oz)      Height:           Flowsheet Rows      First Filed Value   Admission Height 182.9 cm (72\") Documented at 10/14/2021 1532   Admission Weight 131 kg (288 lb 12.8 oz) Documented at 10/14/2021 1532           TELEMETRY: Sinus rhythm    Physical Exam:  Constitutional:       Appearance: Well-developed.   Eyes:      General: No scleral icterus.     Conjunctiva/sclera: Conjunctivae normal.   HENT:      Head: Normocephalic and atraumatic.   Neck:      Vascular: No carotid bruit or JVD.   Pulmonary:      Effort: Pulmonary effort is normal.      Breath sounds: Normal breath sounds. No wheezing. No rales.   Cardiovascular:      Normal rate. Regular rhythm.   Pulses:     Intact distal pulses.   Abdominal:      General: Bowel sounds are normal.      Palpations: Abdomen is soft.   Musculoskeletal:      Cervical back: Normal range of motion and neck supple. Skin:     General: Skin is warm and dry.      Findings: No rash.   Neurological:      Mental Status: Alert.          Results Review:   I reviewed the patient's new clinical results.  Lab Results (last 24 hours)     ** No results found for the last 24 hours. **          Imaging Results (Last 24 Hours)     ** No results found for the last 24 hours. **          EKG      I personally viewed and interpreted the patient's EKG/Telemetry data:    ECHOCARDIOGRAM:    STRESS MYOVIEW:    CARDIAC CATHETERIZATION:    OTHER:         Assessment/Plan     Active Problems:    Atrial fibrillation (HCC)    CHF due to valvular disease (HCC)    Presence of coronary angioplasty implant and graft    Benign hypertension with chronic kidney disease    AV block, complete (HCC)    Angina of effort (HCC)    Chronic renal insufficiency  Diabetes  Dyslipidemia  Hypertension  COPD  Chronic renal sufficiency  Atrial fibrillation  Cirrhosis  Peripheral vascular disease    Patient presented " with chest pain and is ruled out for MI by EKG enzymes  Patient had a stress partially which is abnormal  Patient will have a cardiac catheterization performed.  Patient is on Eliquis for atrial fibrillation which is held  Patient blood pressure heart rate stable  Patient's renal function is watched by the nephrologist and will be cleared for cardiac authorization by the nephrologist    I discussed the patients findings and my recommendations with patient and nurse    Blaine Norwood MD  10/17/21  12:35 EDT

## 2021-10-18 ENCOUNTER — APPOINTMENT (OUTPATIENT)
Dept: CARDIOLOGY | Facility: HOSPITAL | Age: 76
End: 2021-10-18

## 2021-10-18 ENCOUNTER — ANESTHESIA EVENT (OUTPATIENT)
Dept: CARDIOLOGY | Facility: HOSPITAL | Age: 76
End: 2021-10-18

## 2021-10-18 ENCOUNTER — ANESTHESIA (OUTPATIENT)
Dept: CARDIOLOGY | Facility: HOSPITAL | Age: 76
End: 2021-10-18

## 2021-10-18 LAB
ANION GAP SERPL CALCULATED.3IONS-SCNC: 10 MMOL/L (ref 5–15)
BH CV ECHO MEAS - TR MAX VEL: 316.1 CM/SEC
BUN SERPL-MCNC: 20 MG/DL (ref 8–23)
BUN/CREAT SERPL: 19.8 (ref 7–25)
CALCIUM SPEC-SCNC: 8.6 MG/DL (ref 8.6–10.5)
CHLORIDE SERPL-SCNC: 101 MMOL/L (ref 98–107)
CO2 SERPL-SCNC: 24 MMOL/L (ref 22–29)
CREAT SERPL-MCNC: 1.01 MG/DL (ref 0.76–1.27)
DEPRECATED RDW RBC AUTO: 55.1 FL (ref 37–54)
ERYTHROCYTE [DISTWIDTH] IN BLOOD BY AUTOMATED COUNT: 18.6 % (ref 12.3–15.4)
GFR SERPL CREATININE-BSD FRML MDRD: 72 ML/MIN/1.73
GLUCOSE BLDC GLUCOMTR-MCNC: 148 MG/DL (ref 70–105)
GLUCOSE BLDC GLUCOMTR-MCNC: 164 MG/DL (ref 70–105)
GLUCOSE SERPL-MCNC: 206 MG/DL (ref 65–99)
HCT VFR BLD AUTO: 33.5 % (ref 37.5–51)
HGB BLD-MCNC: 11.4 G/DL (ref 13–17.7)
MAGNESIUM SERPL-MCNC: 2.2 MG/DL (ref 1.6–2.4)
MCH RBC QN AUTO: 28.5 PG (ref 26.6–33)
MCHC RBC AUTO-ENTMCNC: 34.1 G/DL (ref 31.5–35.7)
MCV RBC AUTO: 83.6 FL (ref 79–97)
PLATELET # BLD AUTO: 297 10*3/MM3 (ref 140–450)
PMV BLD AUTO: 7.9 FL (ref 6–12)
POTASSIUM SERPL-SCNC: 4.8 MMOL/L (ref 3.5–5.2)
QT INTERVAL: 450 MS
RBC # BLD AUTO: 4.01 10*6/MM3 (ref 4.14–5.8)
SODIUM SERPL-SCNC: 135 MMOL/L (ref 136–145)
TROPONIN T SERPL-MCNC: <0.01 NG/ML (ref 0–0.03)
TSH SERPL DL<=0.05 MIU/L-ACNC: 2.56 UIU/ML (ref 0.27–4.2)
WBC # BLD AUTO: 6.3 10*3/MM3 (ref 3.4–10.8)

## 2021-10-18 PROCEDURE — 93320 DOPPLER ECHO COMPLETE: CPT | Performed by: INTERNAL MEDICINE

## 2021-10-18 PROCEDURE — C1894 INTRO/SHEATH, NON-LASER: HCPCS | Performed by: INTERNAL MEDICINE

## 2021-10-18 PROCEDURE — 93320 DOPPLER ECHO COMPLETE: CPT

## 2021-10-18 PROCEDURE — B2111ZZ FLUOROSCOPY OF MULTIPLE CORONARY ARTERIES USING LOW OSMOLAR CONTRAST: ICD-10-PCS | Performed by: INTERNAL MEDICINE

## 2021-10-18 PROCEDURE — 93005 ELECTROCARDIOGRAM TRACING: CPT | Performed by: INTERNAL MEDICINE

## 2021-10-18 PROCEDURE — 93312 ECHO TRANSESOPHAGEAL: CPT | Performed by: INTERNAL MEDICINE

## 2021-10-18 PROCEDURE — 83735 ASSAY OF MAGNESIUM: CPT | Performed by: INTERNAL MEDICINE

## 2021-10-18 PROCEDURE — 93325 DOPPLER ECHO COLOR FLOW MAPG: CPT

## 2021-10-18 PROCEDURE — 25010000002 ONDANSETRON PER 1 MG: Performed by: INTERNAL MEDICINE

## 2021-10-18 PROCEDURE — C1769 GUIDE WIRE: HCPCS | Performed by: INTERNAL MEDICINE

## 2021-10-18 PROCEDURE — B2151ZZ FLUOROSCOPY OF LEFT HEART USING LOW OSMOLAR CONTRAST: ICD-10-PCS | Performed by: INTERNAL MEDICINE

## 2021-10-18 PROCEDURE — 25010000002 FENTANYL CITRATE (PF) 100 MCG/2ML SOLUTION: Performed by: INTERNAL MEDICINE

## 2021-10-18 PROCEDURE — 84484 ASSAY OF TROPONIN QUANT: CPT | Performed by: INTERNAL MEDICINE

## 2021-10-18 PROCEDURE — 25010000002 HYDROMORPHONE PER 4 MG: Performed by: FAMILY MEDICINE

## 2021-10-18 PROCEDURE — 25010000002 MIDAZOLAM PER 1 MG: Performed by: INTERNAL MEDICINE

## 2021-10-18 PROCEDURE — 84443 ASSAY THYROID STIM HORMONE: CPT | Performed by: INTERNAL MEDICINE

## 2021-10-18 PROCEDURE — 99152 MOD SED SAME PHYS/QHP 5/>YRS: CPT | Performed by: INTERNAL MEDICINE

## 2021-10-18 PROCEDURE — 93312 ECHO TRANSESOPHAGEAL: CPT

## 2021-10-18 PROCEDURE — 93325 DOPPLER ECHO COLOR FLOW MAPG: CPT | Performed by: INTERNAL MEDICINE

## 2021-10-18 PROCEDURE — 94799 UNLISTED PULMONARY SVC/PX: CPT

## 2021-10-18 PROCEDURE — 99153 MOD SED SAME PHYS/QHP EA: CPT | Performed by: INTERNAL MEDICINE

## 2021-10-18 PROCEDURE — 25010000002 PROPOFOL 200 MG/20ML EMULSION: Performed by: ANESTHESIOLOGY

## 2021-10-18 PROCEDURE — 4A023N8 MEASUREMENT OF CARDIAC SAMPLING AND PRESSURE, BILATERAL, PERCUTANEOUS APPROACH: ICD-10-PCS | Performed by: INTERNAL MEDICINE

## 2021-10-18 PROCEDURE — 93010 ELECTROCARDIOGRAM REPORT: CPT | Performed by: INTERNAL MEDICINE

## 2021-10-18 PROCEDURE — 80048 BASIC METABOLIC PNL TOTAL CA: CPT | Performed by: INTERNAL MEDICINE

## 2021-10-18 PROCEDURE — 82962 GLUCOSE BLOOD TEST: CPT

## 2021-10-18 PROCEDURE — 99233 SBSQ HOSP IP/OBS HIGH 50: CPT | Performed by: INTERNAL MEDICINE

## 2021-10-18 PROCEDURE — 0 IOPAMIDOL PER 1 ML: Performed by: INTERNAL MEDICINE

## 2021-10-18 PROCEDURE — 93460 R&L HRT ART/VENTRICLE ANGIO: CPT | Performed by: INTERNAL MEDICINE

## 2021-10-18 PROCEDURE — 25010000002 HYDROMORPHONE PER 4 MG: Performed by: INTERNAL MEDICINE

## 2021-10-18 PROCEDURE — 85027 COMPLETE CBC AUTOMATED: CPT | Performed by: INTERNAL MEDICINE

## 2021-10-18 RX ORDER — SODIUM CHLORIDE 9 MG/ML
75 INJECTION, SOLUTION INTRAVENOUS CONTINUOUS
Status: DISPENSED | OUTPATIENT
Start: 2021-10-18 | End: 2021-10-18

## 2021-10-18 RX ORDER — PROPOFOL 10 MG/ML
INJECTION, EMULSION INTRAVENOUS AS NEEDED
Status: DISCONTINUED | OUTPATIENT
Start: 2021-10-18 | End: 2021-10-18 | Stop reason: SURG

## 2021-10-18 RX ORDER — ONDANSETRON 4 MG/1
4 TABLET, FILM COATED ORAL EVERY 6 HOURS PRN
Status: DISCONTINUED | OUTPATIENT
Start: 2021-10-18 | End: 2021-10-21 | Stop reason: HOSPADM

## 2021-10-18 RX ORDER — SODIUM CHLORIDE 0.9 % (FLUSH) 0.9 %
3-10 SYRINGE (ML) INJECTION AS NEEDED
Status: DISCONTINUED | OUTPATIENT
Start: 2021-10-18 | End: 2021-10-18

## 2021-10-18 RX ORDER — SODIUM CHLORIDE 9 MG/ML
250 INJECTION, SOLUTION INTRAVENOUS ONCE AS NEEDED
Status: DISCONTINUED | OUTPATIENT
Start: 2021-10-18 | End: 2021-10-21 | Stop reason: HOSPADM

## 2021-10-18 RX ORDER — MIDAZOLAM HYDROCHLORIDE 1 MG/ML
INJECTION INTRAMUSCULAR; INTRAVENOUS AS NEEDED
Status: DISCONTINUED | OUTPATIENT
Start: 2021-10-18 | End: 2021-10-18 | Stop reason: HOSPADM

## 2021-10-18 RX ORDER — OXYCODONE HYDROCHLORIDE 5 MG/1
10 TABLET ORAL EVERY 4 HOURS PRN
Status: DISCONTINUED | OUTPATIENT
Start: 2021-10-18 | End: 2021-10-21

## 2021-10-18 RX ORDER — ACETAMINOPHEN 325 MG/1
650 TABLET ORAL EVERY 4 HOURS PRN
Status: DISCONTINUED | OUTPATIENT
Start: 2021-10-18 | End: 2021-10-21 | Stop reason: HOSPADM

## 2021-10-18 RX ORDER — DIPHENHYDRAMINE HCL 25 MG
25 CAPSULE ORAL EVERY 6 HOURS PRN
Status: DISCONTINUED | OUTPATIENT
Start: 2021-10-18 | End: 2021-10-21 | Stop reason: HOSPADM

## 2021-10-18 RX ORDER — LIDOCAINE HYDROCHLORIDE 20 MG/ML
INJECTION, SOLUTION INFILTRATION; PERINEURAL AS NEEDED
Status: DISCONTINUED | OUTPATIENT
Start: 2021-10-18 | End: 2021-10-18 | Stop reason: HOSPADM

## 2021-10-18 RX ORDER — FENTANYL CITRATE 50 UG/ML
INJECTION, SOLUTION INTRAMUSCULAR; INTRAVENOUS AS NEEDED
Status: DISCONTINUED | OUTPATIENT
Start: 2021-10-18 | End: 2021-10-18 | Stop reason: HOSPADM

## 2021-10-18 RX ORDER — ONDANSETRON 2 MG/ML
4 INJECTION INTRAMUSCULAR; INTRAVENOUS EVERY 6 HOURS PRN
Status: DISCONTINUED | OUTPATIENT
Start: 2021-10-18 | End: 2021-10-21 | Stop reason: HOSPADM

## 2021-10-18 RX ORDER — SODIUM CHLORIDE 0.9 % (FLUSH) 0.9 %
3 SYRINGE (ML) INJECTION EVERY 12 HOURS SCHEDULED
Status: DISCONTINUED | OUTPATIENT
Start: 2021-10-18 | End: 2021-10-18

## 2021-10-18 RX ADMIN — Medication 3 ML: at 09:31

## 2021-10-18 RX ADMIN — Medication 600 MG: at 15:47

## 2021-10-18 RX ADMIN — MIRTAZAPINE 30 MG: 15 TABLET, FILM COATED ORAL at 22:21

## 2021-10-18 RX ADMIN — ROPINIROLE HYDROCHLORIDE 0.5 MG: 0.25 TABLET, FILM COATED ORAL at 22:22

## 2021-10-18 RX ADMIN — FAMOTIDINE 40 MG: 20 TABLET ORAL at 08:46

## 2021-10-18 RX ADMIN — HYDROMORPHONE HYDROCHLORIDE 0.5 MG: 2 INJECTION INTRAMUSCULAR; INTRAVENOUS; SUBCUTANEOUS at 08:47

## 2021-10-18 RX ADMIN — HYDROMORPHONE HYDROCHLORIDE 0.5 MG: 2 INJECTION INTRAMUSCULAR; INTRAVENOUS; SUBCUTANEOUS at 18:45

## 2021-10-18 RX ADMIN — HYDROMORPHONE HYDROCHLORIDE 0.5 MG: 2 INJECTION INTRAMUSCULAR; INTRAVENOUS; SUBCUTANEOUS at 05:51

## 2021-10-18 RX ADMIN — ONDANSETRON 4 MG: 2 INJECTION INTRAMUSCULAR; INTRAVENOUS at 18:38

## 2021-10-18 RX ADMIN — HYDROMORPHONE HYDROCHLORIDE 0.5 MG: 2 INJECTION INTRAMUSCULAR; INTRAVENOUS; SUBCUTANEOUS at 22:32

## 2021-10-18 RX ADMIN — SODIUM CHLORIDE 30 ML/HR: 0.9 INJECTION, SOLUTION INTRAVENOUS at 09:38

## 2021-10-18 RX ADMIN — TRAZODONE HYDROCHLORIDE 100 MG: 100 TABLET ORAL at 22:21

## 2021-10-18 RX ADMIN — HYDROMORPHONE HYDROCHLORIDE 0.5 MG: 2 INJECTION INTRAMUSCULAR; INTRAVENOUS; SUBCUTANEOUS at 15:46

## 2021-10-18 RX ADMIN — BUDESONIDE 0.5 MG: 0.5 SUSPENSION RESPIRATORY (INHALATION) at 06:53

## 2021-10-18 RX ADMIN — ATORVASTATIN CALCIUM 40 MG: 40 TABLET, FILM COATED ORAL at 08:47

## 2021-10-18 RX ADMIN — FUROSEMIDE 20 MG: 20 TABLET ORAL at 08:46

## 2021-10-18 RX ADMIN — Medication 3 ML: at 22:32

## 2021-10-18 RX ADMIN — LOSARTAN POTASSIUM 50 MG: 50 TABLET, FILM COATED ORAL at 08:47

## 2021-10-18 RX ADMIN — POTASSIUM CHLORIDE 10 MEQ: 750 TABLET, EXTENDED RELEASE ORAL at 08:47

## 2021-10-18 RX ADMIN — PROPOFOL 60 MG: 10 INJECTION, EMULSION INTRAVENOUS at 16:16

## 2021-10-18 RX ADMIN — POTASSIUM CHLORIDE 10 MEQ: 750 TABLET, EXTENDED RELEASE ORAL at 19:53

## 2021-10-18 RX ADMIN — PREGABALIN 150 MG: 75 CAPSULE ORAL at 22:21

## 2021-10-18 RX ADMIN — HYDROMORPHONE HYDROCHLORIDE 0.5 MG: 2 INJECTION INTRAMUSCULAR; INTRAVENOUS; SUBCUTANEOUS at 02:44

## 2021-10-18 RX ADMIN — OXYCODONE 10 MG: 5 TABLET ORAL at 08:46

## 2021-10-18 RX ADMIN — POTASSIUM CHLORIDE 10 MEQ: 750 TABLET, EXTENDED RELEASE ORAL at 11:48

## 2021-10-18 RX ADMIN — PREGABALIN 150 MG: 75 CAPSULE ORAL at 08:46

## 2021-10-18 RX ADMIN — NITROGLYCERIN 0.4 MG: 0.4 TABLET SUBLINGUAL at 07:13

## 2021-10-18 RX ADMIN — HYDROMORPHONE HYDROCHLORIDE 0.5 MG: 2 INJECTION INTRAMUSCULAR; INTRAVENOUS; SUBCUTANEOUS at 11:48

## 2021-10-18 NOTE — CONSULTS
Nutrition Services    Patient Name: Ro Nathan  YOB: 1945  MRN: [id number]  Admission date: 10/14/2021    PPE Documentation        PPE Worn By Provider mask, gloves, eye protection and gown   PPE Worn By Patient  none     NUTRITION SCREENING      Encounter Information: 10/18: Visited patient for NELIDA consult. Patient currently NPO awaiting heart cath today. Reports a good appetite and no issues with food services. Denies weight loss or chewing/swallowing issues.       PO Diet: NPO Diet   PO Supplements: -    PO Intake:  -100% on previous CC diet       Labs (reviewed below): -reviewed        GI Function:  -BM 10/16       Skin: -no pressure injuries       Weight Hx Review: -no significant weight change    Wt Readings from Last 10 Encounters:   10/18/21 0324 126 kg (277 lb 14.4 oz)   10/17/21 0352 126 kg (277 lb 8 oz)   10/16/21 0306 126 kg (277 lb 12.8 oz)   10/15/21 0326 126 kg (278 lb)   10/14/21 2020 126 kg (278 lb)   10/14/21 1532 131 kg (288 lb 12.8 oz)   09/23/21 1519 128 kg (282 lb)   09/10/21 1334 128 kg (282 lb 3.2 oz)   08/13/21 1046 123 kg (270 lb 6.4 oz)   08/09/21 0925 125 kg (276 lb)   08/06/21 0500 126 kg (276 lb 10.8 oz)   08/05/21 0415 123 kg (270 lb 1 oz)   08/02/21 0500 124 kg (273 lb 5.9 oz)   07/29/21 0413 124 kg (272 lb 9.6 oz)   07/27/21 1202 127 kg (280 lb)   07/21/21 0305 130 kg (286 lb 14.4 oz)   07/20/21 0319 129 kg (285 lb 7.9 oz)   07/19/21 1449 130 kg (287 lb)   07/19/21 0135 131 kg (287 lb 14.7 oz)   07/17/21 0500 129 kg (284 lb 9.8 oz)   07/16/21 1705 128 kg (282 lb 12.8 oz)   07/16/21 1218 129 kg (285 lb)   06/29/21 1015 129 kg (285 lb)   05/25/21 1454 132 kg (291 lb)   05/21/21 0335 131 kg (289 lb 8 oz)   05/20/21 0314 130 kg (286 lb 12.8 oz)   05/19/21 0234 131 kg (289 lb 14.4 oz)   05/18/21 0329 129 kg (285 lb 6.4 oz)   05/17/21 2028 129 kg (284 lb 11.2 oz)            Nutrition Intervention: No nutrition diagnosis at present time.   Resume po diet as indicated  post-procedure.       Results from last 7 days   Lab Units 10/18/21  0503 10/17/21  1451 10/16/21  0544 10/15/21  0610 10/14/21  1633   SODIUM mmol/L 135* 134* 134*   < > 135*   POTASSIUM mmol/L 4.8 4.3 4.1   < > 4.6   CHLORIDE mmol/L 101 96* 100   < > 99   CO2 mmol/L 24.0 23.0 22.0   < > 24.0   BUN mg/dL 20 22 23   < > 21   CREATININE mg/dL 1.01 1.01 1.07   < > 1.07   CALCIUM mg/dL 8.6 8.6 8.3*   < > 8.8   BILIRUBIN mg/dL  --   --   --   --  0.6   ALK PHOS U/L  --   --   --   --  166*   ALT (SGPT) U/L  --   --   --   --  11   AST (SGOT) U/L  --   --   --   --  14   GLUCOSE mg/dL 206* 216* 164*   < > 143*    < > = values in this interval not displayed.     Results from last 7 days   Lab Units 10/18/21  0503 10/16/21  0544 10/15/21  0610   MAGNESIUM mg/dL 2.2  --  1.9   HEMOGLOBIN g/dL 11.4*   < > 12.2*   HEMATOCRIT % 33.5*   < > 35.2*    < > = values in this interval not displayed.     COVID19   Date Value Ref Range Status   10/14/2021 Not Detected Not Detected - Ref. Range Final     Lab Results   Component Value Date    HGBA1C 6.7 (H) 09/28/2021       RD to follow up per protocol.    Electronically signed by:  Danelle Moore, APOLINAR  10/18/21 15:17 EDT

## 2021-10-18 NOTE — PROGRESS NOTES
"NEPHROLOGY PROGRESS NOTE------[facility]    [facility]  [phone]  Leonel Camilo MD      Patient Care Team:  Yusuf Jones MD as PCP - Joshua Evangelista MD as Consulting Physician (Cardiology)  Moise Watson MD as Consulting Physician (Cardiac Electrophysiology)  Izzy Alvarez MD as Consulting Physician (Nephrology)  Celine Swanson MD as Consulting Physician (Cardiology)      Provider:  Leonel Camilo MD  Patient Name: Ro Nathan  :  1945    SUBJECTIVE:  F/u CKD  No chest pain or SOA      Medication:  [MAR Hold] Acetylcysteine, 600 mg, Oral, BID  aspirin, 81 mg, Oral, Daily  atorvastatin, 40 mg, Oral, QAM  budesonide, 0.5 mg, Nebulization, BID - RT  clopidogrel, 75 mg, Oral, QAM  furosemide, 20 mg, Oral, QAM  isosorbide mononitrate, 60 mg, Oral, Q24H  losartan, 50 mg, Oral, QAM  metoprolol succinate XL, 25 mg, Oral, Q24H  potassium chloride, 10 mEq, Oral, TID With Meals  pregabalin, 150 mg, Oral, BID  rOPINIRole, 0.5 mg, Oral, Nightly  sodium chloride, 3 mL, Intravenous, Q12H  traZODone, 100 mg, Oral, Nightly           OBJECTIVE    Vital Sign Min/Max for last 24 hours  Temp  Min: 98.1 °F (36.7 °C)  Max: 98.1 °F (36.7 °C)   BP  Min: 102/74  Max: 180/164   Pulse  Min: 70  Max: 94   Resp  Min: 16  Max: 21   SpO2  Min: 91 %  Max: 100 %   No data recorded   Weight  Min: 126 kg (278 lb)  Max: 126 kg (278 lb)     Flowsheet Rows      First Filed Value   Admission Height 182.9 cm (72\") Documented at 10/14/2021 153   Admission Weight 131 kg (288 lb 12.8 oz) Documented at 10/14/2021 1532          I/O this shift:  In: 600 [P.O.:600]  Out: -   I/O last 3 completed shifts:  In: 480 [P.O.:480]  Out: -     Physical Exam:  General Appearance: alert, appears stated age and cooperative  Head: normocephalic, without obvious abnormality and atraumatic  Eyes: conjunctivae and sclerae normal and no icterus  Neck: " supple and no JVD  Lungs: clear to auscultation and respirations regular  Heart: regular rhythm & normal rate and normal S1, S2  Chest: Wall no abnormalities observed  Abdomen: normal bowel sounds and soft non-tender  Extremities: moves extremities well, no edema, no cyanosis and no redness  Skin: no bleeding, bruising or rash, turgor normal, color normal and no lesions noted  Neurologic: Alert, and oriented. No focal deficits    Labs:    WBC WBC   Date Value Ref Range Status   10/19/2021 5.90 3.40 - 10.80 10*3/mm3 Final   10/18/2021 6.30 3.40 - 10.80 10*3/mm3 Final   10/17/2021 6.30 3.40 - 10.80 10*3/mm3 Final      HGB Hemoglobin   Date Value Ref Range Status   10/19/2021 11.2 (L) 13.0 - 17.7 g/dL Final   10/18/2021 11.4 (L) 13.0 - 17.7 g/dL Final   10/17/2021 12.8 (L) 13.0 - 17.7 g/dL Final      HCT Hematocrit   Date Value Ref Range Status   10/19/2021 33.3 (L) 37.5 - 51.0 % Final   10/18/2021 33.5 (L) 37.5 - 51.0 % Final   10/17/2021 37.6 37.5 - 51.0 % Final      Platlets No results found for: LABPLAT   MCV MCV   Date Value Ref Range Status   10/19/2021 85.1 79.0 - 97.0 fL Final   10/18/2021 83.6 79.0 - 97.0 fL Final   10/17/2021 86.2 79.0 - 97.0 fL Final          Sodium Sodium   Date Value Ref Range Status   10/19/2021 131 (L) 136 - 145 mmol/L Final   10/18/2021 135 (L) 136 - 145 mmol/L Final   10/17/2021 134 (L) 136 - 145 mmol/L Final      Potassium Potassium   Date Value Ref Range Status   10/19/2021 4.9 3.5 - 5.2 mmol/L Final     Comment:     Slight hemolysis detected by analyzer. Results may be affected.   10/18/2021 4.8 3.5 - 5.2 mmol/L Final   10/17/2021 4.3 3.5 - 5.2 mmol/L Final      Chloride Chloride   Date Value Ref Range Status   10/19/2021 98 98 - 107 mmol/L Final   10/18/2021 101 98 - 107 mmol/L Final   10/17/2021 96 (L) 98 - 107 mmol/L Final      CO2 CO2   Date Value Ref Range Status   10/19/2021 26.0 22.0 - 29.0 mmol/L Final   10/18/2021 24.0 22.0 - 29.0 mmol/L Final   10/17/2021 23.0 22.0 - 29.0  mmol/L Final      BUN BUN   Date Value Ref Range Status   10/19/2021 18 8 - 23 mg/dL Final   10/18/2021 20 8 - 23 mg/dL Final   10/17/2021 22 8 - 23 mg/dL Final      Creatinine Creatinine   Date Value Ref Range Status   10/19/2021 1.10 0.76 - 1.27 mg/dL Final   10/18/2021 1.01 0.76 - 1.27 mg/dL Final   10/17/2021 1.01 0.76 - 1.27 mg/dL Final      Calcium Calcium   Date Value Ref Range Status   10/19/2021 8.3 (L) 8.6 - 10.5 mg/dL Final   10/18/2021 8.6 8.6 - 10.5 mg/dL Final   10/17/2021 8.6 8.6 - 10.5 mg/dL Final      PO4 No components found for: PO4   Albumin No results found for: ALBUMIN   Magnesium Magnesium   Date Value Ref Range Status   10/18/2021 2.2 1.6 - 2.4 mg/dL Final      Uric Acid No components found for: URIC ACID     Imaging Results (Last 72 Hours)     ** No results found for the last 72 hours. **          Results for orders placed during the hospital encounter of 10/14/21    XR Chest 2 View    Narrative  DATE OF EXAM:  10/14/2021 4:29 PM    PROCEDURE:  XR CHEST 2 VW-    INDICATIONS:  Chest pain.    COMPARISON:  7/23/2021.    TECHNIQUE:  Two radiologic views of the chest.    FINDINGS:  The heart is borderline enlarged. The pulmonary vascular markings are  normal. The lungs and pleural spaces are clear of active disease. There  is a left-sided transvenous pacemaker in place.  There is degenerative  spondylosis of the thoracic spine.    Impression  1. Borderline cardiomegaly.  2. No active pulmonary disease.    Electronically Signed By-Yusuf Gray MD On:10/14/2021 4:33 PM  This report was finalized on [id number] by  Yusuf Gray MD.      Results for orders placed during the hospital encounter of 07/16/21    XR Chest 1 View    Narrative  Examination: XR CHEST 1 VW-    Date of Exam: 7/23/2021 8:25 PM    Indication: picc tip placement; A41.9-Sepsis, unspecified organism;  R41.0-Disorientation, unspecified.    Comparison: 07/16/2021    Technique: 1 view of the chest    FINDINGS:  There is a right-sided  PICC line with its tip in the superior vena cava.  There is a bipolar transvenous pacer unchanged. Heart size appears  mildly enlarged. There is no vascular congestion, airspace opacity, or  pleural effusion noted. Mediastinal contours are stable. No bone  abnormality.    Impression  1. PICC line tip is in the superior vena cava.  2. Mild cardiomegaly. No evidence of active lung disease.    Electronically Signed By-Judith Marie MD On:7/23/2021 9:30 PM  This report was finalized on [id number] by  Judith Marie MD.      XR Foot 3+ View Right    Narrative  DATE OF EXAM:  7/17/2021 4:10 PM    PROCEDURE:  XR FOOT 3+ VW RIGHT-    INDICATIONS:  Right foot wound at the lateral aspect near the fifth metatarsal.  History of diabetes and right foot surgery due to prior infection.    COMPARISON:  04/05/2021    TECHNIQUE:  A minimum of three routine standard radiographic views were obtained of  the right foot.    FINDINGS:  There has been amputation of the fifth metatarsal at the level of the  mid metatarsal. There appears to have been partial amputation of the  remaining distal fifth metatarsal since the prior study.    There appears to be a soft tissue wound at the lateral aspect of the  foot in the area of the resected distal fifth metatarsal. No definite  acute osseous abnormality is seen at the remaining fifth digit compared  to the prior study.  There is soft tissue edema. No significant soft  tissue gas is seen. There are hammertoe deformities. Degenerative  changes are present in the mid and forefoot, as before. No acute  malalignment or displaced fracture is seen. There is enthesophyte  formation at the calcaneus.    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  2.Lateral soft tissue wound with soft tissue edema which may indicate  skin and soft tissue infection. No significant soft tissue gas is seen.  3.No definite radiographic evidence of acute osseous  abnormality.    Electronically Signed By-Joshua Boucher MD On:7/17/2021 5:14 PM  This report was finalized on [id number] by  Joshua Boucher MD.      Results for orders placed during the hospital encounter of 10/02/20    Doppler Ankle Brachial Index Single Level CAR    Interpretation Summary  · Right Conclusion: The right RASHARD is normal. Normal digital pressures.  · Left Conclusion: The left RASHARD is normal. Normal digital pressures.        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 Baseline creatinine 1.05-1.2  · Hypertension  · Type 2 diabetes  · Fluid overload  · Chest pain  · History congestive heart failure     CR stable  For heart cath  Monitor renal function fluid status electrolytes        Leonel Camilo MD  [facility]  [phone]  10/19/21  14:51 EDT

## 2021-10-18 NOTE — PROGRESS NOTES
Referring Provider: Yusuf Jones MD    Reason for follow-up:  Unstable angina  Status post stent  Chronic atrial fibrillation  Status post pacemaker       Patient Care Team:  Yusuf Jones MD as PCP - Joshua Evangelista MD as Consulting Physician (Cardiology)  Moise Watson MD as Consulting Physician (Cardiac Electrophysiology)  Izzy Alvarez MD as Consulting Physician (Nephrology)  Celine Swanson MD as Consulting Physician (Cardiology)    Subjective .  Patient had chest discomfort this morning.  Patient received sublingual nitroglycerin.     ROS    Patient is not having any shortness of breath, palpitations, dizziness or syncope.  Denies having any headache ,abdominal pain ,nausea, vomiting , diarrhea constipation, loss of weight or loss of appetite.  Denies having any excessive bruising ,hematuria or blood in the stool.    Review of all systems negative except as indicated    History  Past Medical History:   Diagnosis Date   • Alcoholic cirrhosis of liver with ascites (Formerly [facility]) 10/26/2020   • Benign hypertension with CKD (chronic kidney disease) stage III (Formerly [facility]) 10/26/2020   • Bruises easily    • CHF (congestive heart failure) (Formerly [facility])    • Chronic bronchitis with COPD (chronic obstructive pulmonary disease) (Formerly [facility]) 10/26/2020   • Chronic respiratory failure with hypoxia (Formerly [facility]) 10/26/2020   • Congenital heart defect    • Difficulty attaining erection    • Hypertension    • Low back pain    • Pacemaker    • Peripheral vascular disease due to secondary diabetes (Formerly [facility]) 10/26/2020   • Poor circulation    • Prostate cancer (Formerly [facility])     prostate   • Shortness of breath    • Sleep apnea     using CPAP    • Stage 3a chronic kidney disease (Formerly [facility]) 10/26/2020   • Stented coronary artery 12/05/2019    MICHAEL to LAD   • Type 2 diabetes, controlled, with neuropathy (Formerly [facility]) 10/26/2020    no meds   • Type 2 diabetes, controlled, with neuropathy (Formerly [facility]) 10/26/2020       Past Surgical History:   Procedure  Laterality Date   • ABDOMINAL SURGERY     • APPENDECTOMY     • BONE CURETTAGE Right 1/22/2021    Procedure: Wound excision with fifth metatarsal head resection, right foot.;  Surgeon: Josef Egan DPM;  Location: [facility] MAIN OR;  Service: Podiatry;  Laterality: Right;   • BONE INCISION AND DRAINAGE Right 10/5/2020    Procedure: Incision and drainage with debridement of all nonviable soft tissue and bone with bone biopsy, right foot.;  Surgeon: Josef Egan DPM;  Location: [facility] MAIN OR;  Service: Podiatry;  Laterality: Right;   • CARDIAC CATHETERIZATION N/A 12/5/2019    Procedure: Left Heart Cath;  Surgeon: Mirza Munson MD;  Location: [facility] CATH INVASIVE LOCATION;  Service: Cardiology   • CARDIAC CATHETERIZATION N/A 12/5/2019    Procedure: Stent MICHAEL coronary;  Surgeon: Mirza Munson MD;  Location: [facility] CATH INVASIVE LOCATION;  Service: Cardiology   • CHOLECYSTECTOMY     • ELBOW PROCEDURE      left   • FOOT SURGERY      right foot   • HERNIA REPAIR     • INCISION AND DRAINAGE OF WOUND Right 4/6/2021    Procedure: INCISION AND DRAINAGE OF RIGHT FOOT 5TH METATARSAL TO BONE AND PARTIAL 5TH METATARSAL RESECTION;  Surgeon: Josef Egan DPM;  Location: [facility] MAIN OR;  Service: Podiatry;  Laterality: Right;   • INCISION AND DRAINAGE OF WOUND Right 4/8/2021    Procedure: INCISION AND DRAINAGE BONE, DELAYED PRIMARY CLOSURE;  Surgeon: Josef Egan DPM;  Location: [facility] MAIN OR;  Service: Podiatry;  Laterality: Right;   • INTERVENTIONAL RADIOLOGY PROCEDURE N/A 12/5/2019    Procedure: Intravascular Ultrasound;  Surgeon: Mirza Munson MD;  Location: [facility] CATH INVASIVE LOCATION;  Service: Cardiology   • PACEMAKER IMPLANTATION     • CO RT/LT HEART CATHETERS N/A 12/5/2019    Procedure: Percutaneous Coronary Intervention;  Surgeon: Mirza Munson MD;  Location: [facility] CATH INVASIVE LOCATION;  Service: Cardiology   • WOUND DEBRIDEMENT Right 10/29/2020     Procedure: Preparation and debridement of foot wound with application of Integra wound graft, right foot.;  Surgeon: Josef Egan DPM;  Location: [facility] MAIN OR;  Service: Podiatry;  Laterality: Right;   • WOUND DEBRIDEMENT N/A 2021    Procedure: EXCISIONAL ABDOMINAL WOUND  DEBRIDEMENT;  Surgeon: Cleopatra Wang MD;  Location: [facility] MAIN OR;  Service: General;  Laterality: N/A;   • WOUND DEBRIDEMENT N/A 2021    Procedure: DEBRIDEMENT OF ABDOMINAL WALL;  Surgeon: Hill Bhatia DO;  Location: [facility] MAIN OR;  Service: General;  Laterality: N/A;       Family History   Problem Relation Age of Onset   • Alzheimer's disease Mother    • GI problems Father    • Heart disease Father        Social History     Tobacco Use   • Smoking status: Former Smoker     Years: 15.00     Types: Cigarettes     Start date: 1966     Quit date: 5/10/2020     Years since quittin.4   • Smokeless tobacco: Never Used   Vaping Use   • Vaping Use: Never used   Substance Use Topics   • Alcohol use: Yes     Alcohol/week: 4.0 standard drinks     Types: 4 Shots of liquor per week     Comment: maybe 4 shots per month   • Drug use: No        Medications Prior to Admission   Medication Sig Dispense Refill Last Dose   • apixaban (ELIQUIS) 5 MG tablet tablet Take 5 mg by mouth 2 (Two) Times a Day.   10/14/2021 at Unknown time   • aspirin 81 MG EC tablet Take 1 tablet by mouth Daily. 30 tablet 5 10/14/2021 at Unknown time   • atorvastatin (LIPITOR) 40 MG tablet Take 40 mg by mouth Every Morning.   10/14/2021 at Unknown time   • budesonide (PULMICORT) 0.5 MG/2ML nebulizer solution Take 0.5 mg by nebulization 2 (Two) Times a Day As Needed.   10/14/2021 at Unknown time   • clopidogrel (PLAVIX) 75 MG tablet Take 75 mg by mouth Every Morning.   10/14/2021 at Unknown time   • furosemide (LASIX) 20 MG tablet Take 20 mg by mouth Every Morning.   10/14/2021 at Unknown time   • ipratropium-albuterol (DUO-NEB) 0.5-2.5 mg/3 ml nebulizer 3 mL 4  (Four) Times a Day As Needed.   10/14/2021 at Unknown time   • losartan (COZAAR) 50 MG tablet Take 50 mg by mouth Every Morning.   10/14/2021 at Unknown time   • metoprolol tartrate (LOPRESSOR) 25 MG tablet Take 25 mg by mouth 2 (Two) Times a Day.   10/14/2021 at Unknown time   • nitroglycerin (NITROSTAT) 0.4 MG SL tablet Place 0.4 mg under the tongue Every 5 (Five) Minutes As Needed for Chest Pain.   10/14/2021 at Unknown time   • Perforomist 20 MCG/2ML nebulizer solution 20 mcg 2 (Two) Times a Day As Needed.   10/14/2021 at Unknown time   • potassium chloride (K-DUR) 10 MEQ CR tablet Take 10 mEq by mouth 3 (Three) Times a Day With Meals.   10/14/2021 at Unknown time   • pregabalin (LYRICA) 150 MG capsule TAKE ONE CAPSULE BY MOUTH TWICE A DAY 60 capsule 2 10/14/2021 at Unknown time   • rOPINIRole (REQUIP) 0.5 MG tablet Take 0.5 mg by mouth Every Night.   10/13/2021 at Unknown time   • VENTOLIN  (90 Base) MCG/ACT inhaler Inhale 2 puffs Every 6 (Six) Hours As Needed for Wheezing or Shortness of Air.   Past Month at Unknown time   • miconazole (MICOTIN) 2 % cream Apply 1 application topically to the appropriate area as directed Every 12 (Twelve) Hours. 60 g 1 Unknown at Unknown time   • mirtazapine (REMERON) 30 MG tablet Take 30 mg by mouth Every Night.   Unknown at Unknown time   • oxyCODONE-acetaminophen (Percocet)  MG per tablet Take 1 tablet by mouth Every 6 (Six) Hours As Needed for Moderate Pain . 120 tablet 0        Allergies  Haloperidol, Morphine, Pantoprazole, and Latex    Scheduled Meds:aspirin, 81 mg, Oral, Daily  atorvastatin, 40 mg, Oral, QAM  budesonide, 0.5 mg, Nebulization, BID - RT  clopidogrel, 75 mg, Oral, QAM  famotidine, 40 mg, Oral, Daily  furosemide, 20 mg, Oral, QAM  losartan, 50 mg, Oral, QAM  mirtazapine, 30 mg, Oral, Nightly  potassium chloride, 10 mEq, Oral, TID With Meals  pregabalin, 150 mg, Oral, BID  rOPINIRole, 0.5 mg, Oral, Nightly  sodium chloride, 3 mL, Intravenous,  "Q12H  traZODone, 100 mg, Oral, Nightly      Continuous Infusions:   PRN Meds:.•  acetaminophen  •  HYDROmorphone  •  influenza vaccine  •  ipratropium-albuterol  •  nitroglycerin  •  ondansetron  •  oxyCODONE  •  [COMPLETED] Insert peripheral IV **AND** sodium chloride  •  sodium chloride    Objective     VITAL SIGNS  Vitals:    10/17/21 1928 10/17/21 2115 10/17/21 2328 10/18/21 0324   BP: 128/71 152/90 135/77 140/80   BP Location: Left arm Left arm Left arm Left arm   Patient Position: Lying Lying Lying Lying   Pulse: 80 82 78 72   Resp: 20  20 18   Temp: 98.5 °F (36.9 °C)  97.7 °F (36.5 °C) 97.7 °F (36.5 °C)   TempSrc: Oral  Oral Oral   SpO2: 97% 99% 95% 98%   Weight:    126 kg (277 lb 14.4 oz)   Height:           Flowsheet Rows      First Filed Value   Admission Height 182.9 cm (72\") Documented at 10/14/2021 1532   Admission Weight 131 kg (288 lb 12.8 oz) Documented at 10/14/2021 1532            Intake/Output Summary (Last 24 hours) at 10/18/2021 0615  Last data filed at 10/17/2021 1928  Gross per 24 hour   Intake 480 ml   Output --   Net 480 ml        TELEMETRY: Pacemaker rhythm    Physical Exam:  The patient is alert, oriented and in no distress.  Vital signs as noted above.  Exogenous obesity.  Head and neck revealed no carotid bruits or jugular venous distention.  No thyromegaly or lymphadenopathy is present  Lungs clear.  No wheezing.  Breath sounds are normal bilaterally.  Heart normal first and second heart sounds.  No murmur. No precordial rub is present.  No gallop is present.  Abdomen soft and nontender.  No organomegaly is present.  Significant ventral hernia.  Extremities with good peripheral pulses without any pedal edema.  Skin warm and dry.  Musculoskeletal system is grossly normal  CNS grossly normal      Results Review:   I reviewed the patient's new clinical results.  Lab Results (last 24 hours)     Procedure Component Value Units Date/Time    Basic Metabolic Panel [[id number]]  (Abnormal) " Collected: 10/18/21 0503    Specimen: Blood Updated: 10/18/21 0558     Glucose 206 mg/dL      BUN 20 mg/dL      Creatinine 1.01 mg/dL      Sodium 135 mmol/L      Potassium 4.8 mmol/L      Chloride 101 mmol/L      CO2 24.0 mmol/L      Calcium 8.6 mg/dL      eGFR Non African Amer 72 mL/min/1.73      BUN/Creatinine Ratio 19.8     Anion Gap 10.0 mmol/L     Narrative:      GFR Normal >60  Chronic Kidney Disease <60  Kidney Failure <15      Magnesium [[id number]]  (Normal) Collected: 10/18/21 0503    Specimen: Blood Updated: 10/18/21 0558     Magnesium 2.2 mg/dL     TSH [[id number]]  (Normal) Collected: 10/18/21 0503    Specimen: Blood Updated: 10/18/21 0548     TSH 2.560 uIU/mL     CBC (No Diff) [[id number]]  (Abnormal) Collected: 10/18/21 0503    Specimen: Blood Updated: 10/18/21 0521     WBC 6.30 10*3/mm3      RBC 4.01 10*6/mm3      Hemoglobin 11.4 g/dL      Hematocrit 33.5 %      MCV 83.6 fL      MCH 28.5 pg      MCHC 34.1 g/dL      RDW 18.6 %      RDW-SD 55.1 fl      MPV 7.9 fL      Platelets 297 10*3/mm3     Basic Metabolic Panel [[id number]]  (Abnormal) Collected: 10/17/21 1451    Specimen: Blood Updated: 10/17/21 1628     Glucose 216 mg/dL      BUN 22 mg/dL      Creatinine 1.01 mg/dL      Sodium 134 mmol/L      Potassium 4.3 mmol/L      Chloride 96 mmol/L      CO2 23.0 mmol/L      Calcium 8.6 mg/dL      eGFR Non African Amer 72 mL/min/1.73      BUN/Creatinine Ratio 21.8     Anion Gap 15.0 mmol/L     Narrative:      GFR Normal >60  Chronic Kidney Disease <60  Kidney Failure <15      CBC (No Diff) [[id number]]  (Abnormal) Collected: 10/17/21 1451    Specimen: Blood Updated: 10/17/21 1607     WBC 6.30 10*3/mm3      RBC 4.36 10*6/mm3      Hemoglobin 12.8 g/dL      Hematocrit 37.6 %      MCV 86.2 fL      MCH 29.3 pg      MCHC 34.0 g/dL      RDW 19.0 %      RDW-SD 56.4 fl      MPV 8.5 fL      Platelets 350 10*3/mm3           Imaging Results (Last 24 Hours)     ** No results found for the last 24 hours. **      LAB RESULTS  (LAST 7 DAYS)    CBC  Results from last 7 days   Lab Units 10/18/21  0503 10/17/21  1451 10/16/21  0544 10/15/21  0610 10/14/21  1633   WBC 10*3/mm3 6.30 6.30 5.60 7.90 8.10   RBC 10*6/mm3 4.01* 4.36 4.02* 4.24 4.20   HEMOGLOBIN g/dL 11.4* 12.8* 11.7* 12.2* 12.1*   HEMATOCRIT % 33.5* 37.6 34.2* 35.2* 35.1*   MCV fL 83.6 86.2 85.0 82.9 83.5   PLATELETS 10*3/mm3 297 350 296 310 304       BMP  Results from last 7 days   Lab Units 10/18/21  0503 10/17/21  1451 10/16/21  0544 10/15/21  0610 10/14/21  1633   SODIUM mmol/L 135* 134* 134* 138 135*   POTASSIUM mmol/L 4.8 4.3 4.1 4.3 4.6   CHLORIDE mmol/L 101 96* 100 100 99   CO2 mmol/L 24.0 23.0 22.0 25.0 24.0   BUN mg/dL 20 22 23 19 21   CREATININE mg/dL 1.01 1.01 1.07 1.09 1.07   GLUCOSE mg/dL 206* 216* 164* 138* 143*   MAGNESIUM mg/dL 2.2  --   --  1.9  --        CMP   Results from last 7 days   Lab Units 10/18/21  0503 10/17/21  1451 10/16/21  0544 10/15/21  0610 10/14/21  1633   SODIUM mmol/L 135* 134* 134* 138 135*   POTASSIUM mmol/L 4.8 4.3 4.1 4.3 4.6   CHLORIDE mmol/L 101 96* 100 100 99   CO2 mmol/L 24.0 23.0 22.0 25.0 24.0   BUN mg/dL 20 22 23 19 21   CREATININE mg/dL 1.01 1.01 1.07 1.09 1.07   GLUCOSE mg/dL 206* 216* 164* 138* 143*   ALBUMIN g/dL  --   --   --   --  3.70   BILIRUBIN mg/dL  --   --   --   --  0.6   ALK PHOS U/L  --   --   --   --  166*   AST (SGOT) U/L  --   --   --   --  14   ALT (SGPT) U/L  --   --   --   --  11         BNP        TROPONIN  Results from last 7 days   Lab Units 10/15/21  0610   TROPONIN T ng/mL <0.010       CoAg  Results from last 7 days   Lab Units 10/16/21  0544   INR  1.01       Creatinine Clearance  Estimated Creatinine Clearance: 86.7 mL/min (by C-G formula based on SCr of 1.01 mg/dL).    ABG        Radiology  No radiology results for the last day            EKG            I personally viewed and interpreted the patient's EKG/Telemetry data: Pacemaker rhythm.  Underlying rhythm is atrial  fibrillation.    ECHOCARDIOGRAM:    Results for orders placed during the hospital encounter of 10/14/21    Adult Transthoracic Echo Complete W/ Cont if Necessary Per Protocol    Interpretation Summary  · Estimated left ventricular EF = 55% Left ventricular systolic function is normal.    Indications  Chest pain    Technically satisfactory study.  Mitral valve is structurally normal.  Tricuspid valve is structurally normal.  Aortic valve is structurally normal.  Pulmonic valve could not be well visualized.  No evidence for mitral tricuspid or aortic regurgitation is seen by Doppler study.  Left atrium is normal in size.  Right atrium is normal in size.  Left ventricle is normal in size and contractility with ejection fraction of 55 %.  Right ventricle is normal in size.  Atrial septum is intact.  Aorta is normal.  No pericardial effusion or intracardiac thrombus is seen.    Impression  Structurally and functionally normal cardiac valves.  Left ventricular size and contractility is normal with ejection fraction of 55 %.          STRESS MYOVIEW:    Cardiolite (Tc-99m Sestamibi) stress test    CARDIAC CATHETERIZATION:            OTHER:         Assessment/Plan     Active Problems:    Atrial fibrillation (HCC)    CHF due to valvular disease (HCC)    Presence of coronary angioplasty implant and graft    Benign hypertension with chronic kidney disease    AV block, complete (HCC)    Angina of effort (HCC)    Chronic renal insufficiency    Ro Nathan is a 76 y.o. male who presents with history of multiple cardiac and noncardiac problems is documented in the assessment and plan was admitted to the hospital with history of chest pain for last 2 weeks.  Patient has been having chest pain almost every day and has been taking 1-2 nitroglycerin every day.  Sometimes with exertion sometimes at rest.  Patient recently was seen in the office and was scheduled to have stress Cardiolite test.  However patient has significant  symptoms and patient came to the emergency room.  EKG showed no acute changes.  Patient denies having any fever cough chills.  Chest discomfort is substernal heaviness and tightness without any radiation of the discomfort into the neck or into the arms.  No associated sweating nausea vomiting or shortness of breath.         ]]]]]]]]]]]]]]]]]]]]]  Impression  ========  -Exertional and nonexertional chest discomfort suggestive of angina pectoris.     -Status post stent placement   Status post stent to proximal LAD 12/5/2019.  Patient had stent to circumflex and RCA in the past.     Cardiac catheterization 12/5/2019 by Dr. Munson  Left ventriculography  The overall estimated left ventricular ejection fraction was 65%.  Native coronary arteriography: Left dominant circulation     1.  Left main coronary arteries a large-caliber vessel gives rise left anterior descending left circumflex flex arteries.  There is an ostial eccentric 40% lesion that appears angiographically to approach 50%. No significant coronary atherosclerotic disease present.  2.  Left anterior descending artery is a large-caliber vessel that gives rise to large caliber diagonal branches and courses along the anterior interventricular groove and wraps around the apex.  There is a proximal eccentric 50% lesion within an in-stent restenotic segment followed by an greater than 80% focal eccentric in-stent restenotic lesion followed by a 60% lesion after the stented segment and otherwise no coronary atherosclerotic disease of any significance present.  3.  The left circumflex arteries a large-caliber vessel gives rise to large caliber bifurcating obtuse marginal branch.  There is an ostial proximal concentric 50% lesion that is calcified that immediately gives rise to a very high first obtuse marginal branch that is best classified as a ramus intermedius branch supplying large territory in the anterolateral wall that has an ostial proximal 70% lesion.  The  calcified continuing circumflex and the posterior AV groove gives a second obtuse marginal branch that has mild diffuse disease, a third obtuse marginal branch and then a bifurcating system that has a bifurcation stenting within it before giving rise to the left PDA.  There is diffuse 50% in-stent restenosis within this bifurcation stented segment into the obtuse marginal branch which itself has diffuse 50 to 60% disease within the stented segment.  There is a concentric 80% lesion in the circumflex PDA.  No significant coronary atherosclerotic disease present  4.  The right coronary arteries a large-caliber nondominant vessel has a stented mid segment after which there several acute marginal branches there is 50% in-stent restenosis approaching 60% at some portions within this nondominant right proximal to mid proximal segment.     Conclusions:     1.  Normal left heart filling pressures with preserved LV systolic function  2.  Normal epicardial anatomy with severe two-vessel coronary artery disease involving what is likely the culprit for the patient's chest pain syndrome, namely the very severe proximal LAD lesions.  We will treat the remaining circumflex lesions with medical therapy and see if the patient's symptoms resolved; this includes the severe lesion within the proximal portion of the high obtuse marginal branch within the circumflex system as well.     -Status post permanent ventricular pacemaker implantation ([employer] 1/13/2015)  Battery status is 4.5 years.     -History of fever and sepsis.  Positive blood cultures for coagulase-negative staph.      EMBER 7/22/2021 revealed  No evidence for valvular vegetations is present.  Significant mitral regurgitation with central jet extending to the lateral wall and into the superior aspect of the left atrium.  Mild tricuspid regurgitation.  Calculated pulmonary artery pressure is 25 mmHg.  Left atrial and left atrial appendage enlargement is present without  clot.  Normal left ventricle size and contractility with ejection fraction of 60%.     -Dyslipidemia diabetes hypertension COPD CKD 3.  History of cirrhosis     -Chronic atrial fibrillation     -History of right foot ulceration with bone involvement.     -Peripheral vascular disease     -Large abdominal ventral hernia.     -Status post appendectomy appendectomy cholecystectomy left elbow surgery hernia repair     -Allergic to morphine pantoprazole haloperidol  ===========  Plan  ========  Patient presents to the hospital with increasing symptoms of chest pain recently.  Patient is taking 1 or 2 nitroglycerin every day.  Patient also has shortness of breath.  Echocardiogram showed normal left ventricle function.  Mild mitral regurgitation.    Status post stent 12/5/2019.  Stress Cardiolite test showed inferior and apical ischemic changes.  EKG showed ventricular pacemaker rhythm.  Intrinsic rhythm is atrial fibrillation.    Patient had chest discomfort this morning.-New problem  EKG showed no acute changes.  Troponin levels are negative    Status post pacemaker.  Patient had recently interrogation of the pacemaker today revealed good pacing parameters with battery status of 4.5 years.   Patient is almost 99.7% paced in the ventricle.     Chronic atrial fibrillation.-Rate controlled     Anticoagulation was reviewed.  Patient was on Eliquis  Eliquis is on hold.    Hypertension-113/77     Dyslipidemia-continue atorvastatin     Patient to have EMBER cardiac catheterization and coronary arteriography today.  Risks and benefits pros and cons of the procedure including infection bleeding blood clot heart attack stroke allergic reaction to the dye renal dysfunction etc. were discussed with patient.     Further plan will depend on patient's progress.  ]]]]]]]]]]]]]]]]]]       Cardiac catheterization (right and left heart and EMBER (10/18/2021) reveale.)    Left ventricular size and contractility is normal with ejection fraction  of 60%. 2+ mitral regurgitation is present.    Mild to moderate pulmonary hypertension    Left main coronary artery is normal.  Left anterior descending artery is a lengthy stent in the proximal segment and is patent.  Ramus intermedius has proximal 99% disease.  Circumflex coronary artery is a large and dominant vessel that has ostial 80 to 90% disease.  It provided multiple marginal branches and PDA.  First marginal branch has mid segment and 99% disease.  PDA is a large vessel that has proximal lengthy 90% disease.  Right coronary artery is a nondominant vessel that has 95% disease in the proximal segment within the stent.    EMBER 10/18/2021 reviewed  Significant mitral regurgitation central jet traversing to the lateral wall and superiorly and into the pulmonary vein.  Mild tricuspid regurgitation.  Calculated pulmonary artery pressure is 40 mmHg  Left atrial enlargement.  Left atrial appendage is normal in size without clot.  Left ventricle is normal in size and contractility with ejection fraction of 60%.    RECOMMENDATIONS:  Patient would benefit from CABG and mitral valve repair versus replacement.  Cardiovascular surgery consultation initiated.  ]]]]]]]]]]]]]]]]]]]]]          Celine Swanson MD  10/18/21  06:15 EDT

## 2021-10-18 NOTE — PROGRESS NOTES
LOS: 0 days   Patient Care Team:  [facility]us[facility] as PCP [facility] (Cardiology)  Moise Watson MD as Consulting Physician (Cardiac Electrophysiology)  Izzy Alvarez MD as Consulting Physician (Nephrology)  Celine Swanson MD as Consulting Physician (Cardiology)    Subjective:  Complains of pain    Objective:   Afebrile      Review of Systems:   Review of Systems   Respiratory: Positive for chest tightness and shortness of breath.    Cardiovascular: Positive for chest pain.   Musculoskeletal: Positive for arthralgias and back pain.   Neurological: Positive for weakness.       Vital Signs  Temp:  [97.2 °F (36.2 °C)-98.5 °F (36.9 °C)] 97.6 °F (36.4 °C)  Heart Rate:  [66-83] 69  Resp:  [17-20] 18  BP: (128-163)/(71-94) 163/94    Physical Exam:  Physical Exam  Vitals and nursing note reviewed.   Cardiovascular:      Rate and Rhythm: Normal rate.      Heart sounds: Normal heart sounds.   Pulmonary:      Breath sounds: Normal breath sounds.   Skin:     General: Skin is warm.   Neurological:      General: No focal deficit present.      Mental Status: He is alert.          Radiology:  XR Chest 2 View    Result Date: 10/14/2021   1. Borderline cardiomegaly. 2. No active pulmonary disease.  Electronically Signed By-Yusuf Gray MD On:10/14/2021 4:33 PM This report was finalized on [id number] by  Yusuf Gray MD.         Results Review:     I reviewed the patient's new clinical results.  I reviewed the patient's new imaging results and agree with the interpretation.    Medication Review:   Scheduled Meds:aspirin, 81 mg, Oral, Daily  atorvastatin, 40 mg, Oral, QAM  budesonide, 0.5 mg, Nebulization, BID - RT  clopidogrel, 75 mg, Oral, QAM  famotidine, 40 mg, Oral, Daily  furosemide, 20 mg, Oral, QAM  losartan, 50 mg, Oral, QAM  mirtazapine, 30 mg, Oral, Nightly  potassium chloride, 10 mEq, Oral, TID With Meals  pregabalin, 150 mg, Oral,  BID  rOPINIRole, 0.5 mg, Oral, Nightly  sodium chloride, 3 mL, Intravenous, Q12H  sodium chloride, 3 mL, Intravenous, Q12H  traZODone, 100 mg, Oral, Nightly      Continuous Infusions:sodium chloride, 30 mL/hr      PRN Meds:.•  acetaminophen  •  HYDROmorphone  •  influenza vaccine  •  ipratropium-albuterol  •  nitroglycerin  •  ondansetron  •  oxyCODONE  •  [COMPLETED] Insert peripheral IV **AND** sodium chloride  •  sodium chloride  •  sodium chloride    Labs:    CBC    Results from last 7 days   Lab Units 10/18/21  0503 10/17/21  1451 10/16/21  0544 10/15/21  0610 10/14/21  1633   WBC 10*3/mm3 6.30 6.30 5.60 7.90 8.10   HEMOGLOBIN g/dL 11.4* 12.8* 11.7* 12.2* 12.1*   PLATELETS 10*3/mm3 297 350 296 310 304     BMP   Results from last 7 days   Lab Units 10/18/21  0503 10/17/21  1451 10/16/21  0544 10/15/21  0610 10/14/21  1633   SODIUM mmol/L 135* 134* 134* 138 135*   POTASSIUM mmol/L 4.8 4.3 4.1 4.3 4.6   CHLORIDE mmol/L 101 96* 100 100 99   CO2 mmol/L 24.0 23.0 22.0 25.0 24.0   BUN mg/dL 20 22 23 19 21   CREATININE mg/dL 1.01 1.01 1.07 1.09 1.07   GLUCOSE mg/dL 206* 216* 164* 138* 143*   MAGNESIUM mg/dL 2.2  --   --  1.9  --      Cr Clearance Estimated Creatinine Clearance: 86.7 mL/min (by C-G formula based on SCr of 1.01 mg/dL).  Coag   Results from last 7 days   Lab Units 10/16/21  0544   INR  1.01     HbA1C   Lab Results   Component Value Date    HGBA1C 6.7 (H) 09/28/2021    HGBA1C 9.0 (H) 07/28/2021    HGBA1C 9.3 (H) 07/16/2021     Blood Glucose No results found for: POCGLU  Infection     CMP   Results from last 7 days   Lab Units 10/18/21  0503 10/17/21  1451 10/16/21  0544 10/15/21  0610 10/14/21  1633   SODIUM mmol/L 135* 134* 134* 138 135*   POTASSIUM mmol/L 4.8 4.3 4.1 4.3 4.6   CHLORIDE mmol/L 101 96* 100 100 99   CO2 mmol/L 24.0 23.0 22.0 25.0 24.0   BUN mg/dL 20 22 23 19 21   CREATININE mg/dL 1.01 1.01 1.07 1.09 1.07   GLUCOSE mg/dL 206* 216* 164* 138* 143*   ALBUMIN g/dL  --   --   --   --  3.70    BILIRUBIN mg/dL  --   --   --   --  0.6   ALK PHOS U/L  --   --   --   --  166*   AST (SGOT) U/L  --   --   --   --  14   ALT (SGPT) U/L  --   --   --   --  11     UA      Radiology(recent) No radiology results for the last day   Assessment:    Substernal chest pain worrisome for unstable angina  Abnormal stress Myoview  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  Diabetes mellitus type 2 with renal manifestations  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  Diabetic dyslipidemia  Chronic oral anticoagulation  Chronic kidney disease stage IIIa  History of intermittent bowel obstruction secondary to massive ventral hernia  History of right renal mass  History of 4 cm infrarenal abdominal aortic aneurysm  Opiate dependency  Hypertension associated chronic kidney disease stage IIIa  Panlobular COPD with emphysema  Morbid exogenous obesity  Obesity hypoventilation syndrome  Monoclonal paraproteinemia  Valvular heart disease with significant mitral regurgitation  Mild tricuspid regurgitation  Paroxysmal atrial fibrillation  Hypercoagulable state secondary to atrial fibrillation  Restless leg syndrome  Supplemental oxygen dependency  Dependency upon noninvasive mechanical ventilator  History of prostate cancer  Atherosclerotic heart disease of native coronary arteries with angina pectoris  Chronic mucopurulent bronchitis  Hypoventilation apnea syndrome with ARTI  Herbert's esophageal change  Gastroesophageal reflux disease with esophagitis  Liver cirrhosis  History of alcoholism  Nicotine dependency with nicotine use disorder, cigarettes  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  Autonomic neuropathy secondary to diabetes  Peripheral vascular disease  B12 deficiency  Vitamin D deficiency  Shape pacemaker placement  History of percutaneous coronary convention with stent placement      Plan:    Elective coronary catheterization  today//gentle renal support      Yusuf Jones MD  10/18/21  08:01 EDT

## 2021-10-18 NOTE — CASE MANAGEMENT/SOCIAL WORK
Continued Stay Note  KATLYN Devlin     Patient Name: Ro Nathan  MRN: [id number]  Today's Date: 10/18/2021    Admit Date: 10/14/2021     Discharge Plan     Row Name 10/18/21 1515       Plan    Plan DC plan: routine discharge home w/ [facility] (accepted and order complete).              Phone communication or documentation only - no physical contact with patient or family.      Megan Naegele, RN      Office Phone: [phone]  Office Cell: [phone]

## 2021-10-18 NOTE — PLAN OF CARE
Goal Outcome Evaluation:  Plan of Care Reviewed With: patient        Progress: no change  Outcome Summary: Pt is resting in bed. Pt voiced concerns of chest pain, EKG was comparable to previous EKGs, vitals were stable and nitro relieved patients pain. Pt is NPO for heart cath. Call light within reach. Will continue to monitor and treat as appropriate.

## 2021-10-18 NOTE — H&P (VIEW-ONLY)
Referring Provider: Yusuf Jones MD    Reason for follow-up:  Unstable angina  Status post stent  Chronic atrial fibrillation  Status post pacemaker       Patient Care Team:  Yusuf Jones MD as PCP - Joshua Evangelista MD as Consulting Physician (Cardiology)  Moise Watson MD as Consulting Physician (Cardiac Electrophysiology)  Izzy Alvarez MD as Consulting Physician (Nephrology)  Celine Swanson MD as Consulting Physician (Cardiology)    Subjective .  Patient had chest discomfort this morning.  Patient received sublingual nitroglycerin.     ROS    Patient is not having any shortness of breath, palpitations, dizziness or syncope.  Denies having any headache ,abdominal pain ,nausea, vomiting , diarrhea constipation, loss of weight or loss of appetite.  Denies having any excessive bruising ,hematuria or blood in the stool.    Review of all systems negative except as indicated    History  Past Medical History:   Diagnosis Date   • Alcoholic cirrhosis of liver with ascites ([facility]) 10/26/2020   • Benign hypertension with CKD (chronic kidney disease) stage III ([facility]) 10/26/2020   • Bruises easily    • CHF (congestive heart failure) ([facility])    • Chronic bronchitis with COPD (chronic obstructive pulmonary disease) ([facility]) 10/26/2020   • Chronic respiratory failure with hypoxia ([facility]) 10/26/2020   • Congenital heart defect    • Difficulty attaining erection    • Hypertension    • Low back pain    • Pacemaker    • Peripheral vascular disease due to secondary diabetes ([facility]) 10/26/2020   • Poor circulation    • Prostate cancer ([facility])     prostate   • Shortness of breath    • Sleep apnea     using CPAP    • Stage 3a chronic kidney disease ([facility]) 10/26/2020   • Stented coronary artery 12/05/2019    MICHAEL to LAD   • Type 2 diabetes, controlled, with neuropathy ([facility]) 10/26/2020    no meds   • Type 2 diabetes, controlled, with neuropathy ([facility]) 10/26/2020       Past Surgical History:   Procedure  Laterality Date   • ABDOMINAL SURGERY     • APPENDECTOMY     • BONE CURETTAGE Right 1/22/2021    Procedure: Wound excision with fifth metatarsal head resection, right foot.;  Surgeon: Josef Egan DPM;  Location: [facility] MAIN OR;  Service: Podiatry;  Laterality: Right;   • BONE INCISION AND DRAINAGE Right 10/5/2020    Procedure: Incision and drainage with debridement of all nonviable soft tissue and bone with bone biopsy, right foot.;  Surgeon: Josef Egan DPM;  Location: [facility] MAIN OR;  Service: Podiatry;  Laterality: Right;   • CARDIAC CATHETERIZATION N/A 12/5/2019    Procedure: Left Heart Cath;  Surgeon: Mirza Munson MD;  Location: [facility] CATH INVASIVE LOCATION;  Service: Cardiology   • CARDIAC CATHETERIZATION N/A 12/5/2019    Procedure: Stent MICHAEL coronary;  Surgeon: Mirza Munson MD;  Location: [facility] CATH INVASIVE LOCATION;  Service: Cardiology   • CHOLECYSTECTOMY     • ELBOW PROCEDURE      left   • FOOT SURGERY      right foot   • HERNIA REPAIR     • INCISION AND DRAINAGE OF WOUND Right 4/6/2021    Procedure: INCISION AND DRAINAGE OF RIGHT FOOT 5TH METATARSAL TO BONE AND PARTIAL 5TH METATARSAL RESECTION;  Surgeon: Josef Egan DPM;  Location: [facility] MAIN OR;  Service: Podiatry;  Laterality: Right;   • INCISION AND DRAINAGE OF WOUND Right 4/8/2021    Procedure: INCISION AND DRAINAGE BONE, DELAYED PRIMARY CLOSURE;  Surgeon: Josef Egan DPM;  Location: [facility] MAIN OR;  Service: Podiatry;  Laterality: Right;   • INTERVENTIONAL RADIOLOGY PROCEDURE N/A 12/5/2019    Procedure: Intravascular Ultrasound;  Surgeon: Mirza Munson MD;  Location: [facility] CATH INVASIVE LOCATION;  Service: Cardiology   • PACEMAKER IMPLANTATION     • MA RT/LT HEART CATHETERS N/A 12/5/2019    Procedure: Percutaneous Coronary Intervention;  Surgeon: Mirza Munson MD;  Location: [facility] CATH INVASIVE LOCATION;  Service: Cardiology   • WOUND DEBRIDEMENT Right 10/29/2020     Procedure: Preparation and debridement of foot wound with application of Integra wound graft, right foot.;  Surgeon: Josef Egan DPM;  [facility];  Service: Podiatry;  Laterality: Right;   • WOUND DEBRIDEMENT N/A 2021    Procedure: EXCISIONAL ABDOMINAL WOUND  DEBRIDEMENT;  Surgeon: Cleopatra Wang MD;  [facility];  Service: General;  Laterality: N/A;   • WOUND DEBRIDEMENT N/A 2021    Procedure: DEBRIDEMENT OF ABDOMINAL WALL;  Surgeon: Hill Bhatia DO;  [facility];  Service: General;  Laterality: N/A;       Family History   Problem Relation Age of Onset   • Alzheimer's disease Mother    • GI problems Father    • Heart disease Father        Social History     Tobacco Use   • Smoking status: Former Smoker     Years: 15.00     Types: Cigarettes     Start date: 1966     Quit date: 5/10/2020     Years since quittin.4   • Smokeless tobacco: Never Used   Vaping Use   • Vaping Use: Never used   Substance Use Topics   • Alcohol use: Yes     Alcohol/week: 4.0 standard drinks     Types: 4 Shots of liquor per week     Comment: maybe 4 shots per month   • Drug use: No        Medications Prior to Admission   Medication Sig Dispense Refill Last Dose   • apixaban (ELIQUIS) 5 MG tablet tablet Take 5 mg by mouth 2 (Two) Times a Day.   10/14/2021 at Unknown time   • aspirin 81 MG EC tablet Take 1 tablet by mouth Daily. 30 tablet 5 10/14/2021 at Unknown time   • atorvastatin (LIPITOR) 40 MG tablet Take 40 mg by mouth Every Morning.   10/14/2021 at Unknown time   • budesonide (PULMICORT) 0.5 MG/2ML nebulizer solution Take 0.5 mg by nebulization 2 (Two) Times a Day As Needed.   10/14/2021 at Unknown time   • clopidogrel (PLAVIX) 75 MG tablet Take 75 mg by mouth Every Morning.   10/14/2021 at Unknown time   • furosemide (LASIX) 20 MG tablet Take 20 mg by mouth Every Morning.   10/14/2021 at Unknown time   • ipratropium-albuterol (DUO-NEB) 0.5-2.5 mg/3 ml nebulizer 3 mL 4  (Four) Times a Day As Needed.   10/14/2021 at Unknown time   • losartan (COZAAR) 50 MG tablet Take 50 mg by mouth Every Morning.   10/14/2021 at Unknown time   • metoprolol tartrate (LOPRESSOR) 25 MG tablet Take 25 mg by mouth 2 (Two) Times a Day.   10/14/2021 at Unknown time   • nitroglycerin (NITROSTAT) 0.4 MG SL tablet Place 0.4 mg under the tongue Every 5 (Five) Minutes As Needed for Chest Pain.   10/14/2021 at Unknown time   • Perforomist 20 MCG/2ML nebulizer solution 20 mcg 2 (Two) Times a Day As Needed.   10/14/2021 at Unknown time   • potassium chloride (K-DUR) 10 MEQ CR tablet Take 10 mEq by mouth 3 (Three) Times a Day With Meals.   10/14/2021 at Unknown time   • pregabalin (LYRICA) 150 MG capsule TAKE ONE CAPSULE BY MOUTH TWICE A DAY 60 capsule 2 10/14/2021 at Unknown time   • rOPINIRole (REQUIP) 0.5 MG tablet Take 0.5 mg by mouth Every Night.   10/13/2021 at Unknown time   • VENTOLIN  (90 Base) MCG/ACT inhaler Inhale 2 puffs Every 6 (Six) Hours As Needed for Wheezing or Shortness of Air.   Past Month at Unknown time   • miconazole (MICOTIN) 2 % cream Apply 1 application topically to the appropriate area as directed Every 12 (Twelve) Hours. 60 g 1 Unknown at Unknown time   • mirtazapine (REMERON) 30 MG tablet Take 30 mg by mouth Every Night.   Unknown at Unknown time   • oxyCODONE-acetaminophen (Percocet)  MG per tablet Take 1 tablet by mouth Every 6 (Six) Hours As Needed for Moderate Pain . 120 tablet 0        Allergies  Haloperidol, Morphine, Pantoprazole, and Latex    Scheduled Meds:aspirin, 81 mg, Oral, Daily  atorvastatin, 40 mg, Oral, QAM  budesonide, 0.5 mg, Nebulization, BID - RT  clopidogrel, 75 mg, Oral, QAM  famotidine, 40 mg, Oral, Daily  furosemide, 20 mg, Oral, QAM  losartan, 50 mg, Oral, QAM  mirtazapine, 30 mg, Oral, Nightly  potassium chloride, 10 mEq, Oral, TID With Meals  pregabalin, 150 mg, Oral, BID  rOPINIRole, 0.5 mg, Oral, Nightly  sodium chloride, 3 mL, Intravenous,  "Q12H  traZODone, 100 mg, Oral, Nightly      Continuous Infusions:   PRN Meds:.•  acetaminophen  •  HYDROmorphone  •  influenza vaccine  •  ipratropium-albuterol  •  nitroglycerin  •  ondansetron  •  oxyCODONE  •  [COMPLETED] Insert peripheral IV **AND** sodium chloride  •  sodium chloride    Objective     VITAL SIGNS  Vitals:    10/17/21 1928 10/17/21 2115 10/17/21 2328 10/18/21 0324   BP: 128/71 152/90 135/77 140/80   BP Location: Left arm Left arm Left arm Left arm   Patient Position: Lying Lying Lying Lying   Pulse: 80 82 78 72   Resp: 20  20 18   Temp: 98.5 °F (36.9 °C)  97.7 °F (36.5 °C) 97.7 °F (36.5 °C)   TempSrc: Oral  Oral Oral   SpO2: 97% 99% 95% 98%   Weight:    126 kg (277 lb 14.4 oz)   Height:           Flowsheet Rows      First Filed Value   Admission Height 182.9 cm (72\") Documented at 10/14/2021 1532   Admission Weight 131 kg (288 lb 12.8 oz) Documented at 10/14/2021 1532            Intake/Output Summary (Last 24 hours) at 10/18/2021 0615  Last data filed at 10/17/2021 1928  Gross per 24 hour   Intake 480 ml   Output --   Net 480 ml        TELEMETRY: Pacemaker rhythm    Physical Exam:  The patient is alert, oriented and in no distress.  Vital signs as noted above.  Exogenous obesity.  Head and neck revealed no carotid bruits or jugular venous distention.  No thyromegaly or lymphadenopathy is present  Lungs clear.  No wheezing.  Breath sounds are normal bilaterally.  Heart normal first and second heart sounds.  No murmur. No precordial rub is present.  No gallop is present.  Abdomen soft and nontender.  No organomegaly is present.  Significant ventral hernia.  Extremities with good peripheral pulses without any pedal edema.  Skin warm and dry.  Musculoskeletal system is grossly normal  CNS grossly normal      Results Review:   I reviewed the patient's new clinical results.  Lab Results (last 24 hours)     Procedure Component Value Units Date/Time    Basic Metabolic Panel [[id number]]  (Abnormal) " Collected: 10/18/21 0503    Specimen: Blood Updated: 10/18/21 0558     Glucose 206 mg/dL      BUN 20 mg/dL      Creatinine 1.01 mg/dL      Sodium 135 mmol/L      Potassium 4.8 mmol/L      Chloride 101 mmol/L      CO2 24.0 mmol/L      Calcium 8.6 mg/dL      eGFR Non African Amer 72 mL/min/1.73      BUN/Creatinine Ratio 19.8     Anion Gap 10.0 mmol/L     Narrative:      GFR Normal >60  Chronic Kidney Disease <60  Kidney Failure <15      Magnesium [[id number]]  (Normal) Collected: 10/18/21 0503    Specimen: Blood Updated: 10/18/21 0558     Magnesium 2.2 mg/dL     TSH [[id number]]  (Normal) Collected: 10/18/21 0503    Specimen: Blood Updated: 10/18/21 0548     TSH 2.560 uIU/mL     CBC (No Diff) [[id number]]  (Abnormal) Collected: 10/18/21 0503    Specimen: Blood Updated: 10/18/21 0521     WBC 6.30 10*3/mm3      RBC 4.01 10*6/mm3      Hemoglobin 11.4 g/dL      Hematocrit 33.5 %      MCV 83.6 fL      MCH 28.5 pg      MCHC 34.1 g/dL      RDW 18.6 %      RDW-SD 55.1 fl      MPV 7.9 fL      Platelets 297 10*3/mm3     Basic Metabolic Panel [[id number]]  (Abnormal) Collected: 10/17/21 1451    Specimen: Blood Updated: 10/17/21 1628     Glucose 216 mg/dL      BUN 22 mg/dL      Creatinine 1.01 mg/dL      Sodium 134 mmol/L      Potassium 4.3 mmol/L      Chloride 96 mmol/L      CO2 23.0 mmol/L      Calcium 8.6 mg/dL      eGFR Non African Amer 72 mL/min/1.73      BUN/Creatinine Ratio 21.8     Anion Gap 15.0 mmol/L     Narrative:      GFR Normal >60  Chronic Kidney Disease <60  Kidney Failure <15      CBC (No Diff) [[id number]]  (Abnormal) Collected: 10/17/21 1451    Specimen: Blood Updated: 10/17/21 1607     WBC 6.30 10*3/mm3      RBC 4.36 10*6/mm3      Hemoglobin 12.8 g/dL      Hematocrit 37.6 %      MCV 86.2 fL      MCH 29.3 pg      MCHC 34.0 g/dL      RDW 19.0 %      RDW-SD 56.4 fl      MPV 8.5 fL      Platelets 350 10*3/mm3           Imaging Results (Last 24 Hours)     ** No results found for the last 24 hours. **      LAB RESULTS  (LAST 7 DAYS)    CBC  Results from last 7 days   Lab Units 10/18/21  0503 10/17/21  1451 10/16/21  0544 10/15/21  0610 10/14/21  1633   WBC 10*3/mm3 6.30 6.30 5.60 7.90 8.10   RBC 10*6/mm3 4.01* 4.36 4.02* 4.24 4.20   HEMOGLOBIN g/dL 11.4* 12.8* 11.7* 12.2* 12.1*   HEMATOCRIT % 33.5* 37.6 34.2* 35.2* 35.1*   MCV fL 83.6 86.2 85.0 82.9 83.5   PLATELETS 10*3/mm3 297 350 296 310 304       BMP  Results from last 7 days   Lab Units 10/18/21  0503 10/17/21  1451 10/16/21  0544 10/15/21  0610 10/14/21  1633   SODIUM mmol/L 135* 134* 134* 138 135*   POTASSIUM mmol/L 4.8 4.3 4.1 4.3 4.6   CHLORIDE mmol/L 101 96* 100 100 99   CO2 mmol/L 24.0 23.0 22.0 25.0 24.0   BUN mg/dL 20 22 23 19 21   CREATININE mg/dL 1.01 1.01 1.07 1.09 1.07   GLUCOSE mg/dL 206* 216* 164* 138* 143*   MAGNESIUM mg/dL 2.2  --   --  1.9  --        CMP   Results from last 7 days   Lab Units 10/18/21  0503 10/17/21  1451 10/16/21  0544 10/15/21  0610 10/14/21  1633   SODIUM mmol/L 135* 134* 134* 138 135*   POTASSIUM mmol/L 4.8 4.3 4.1 4.3 4.6   CHLORIDE mmol/L 101 96* 100 100 99   CO2 mmol/L 24.0 23.0 22.0 25.0 24.0   BUN mg/dL 20 22 23 19 21   CREATININE mg/dL 1.01 1.01 1.07 1.09 1.07   GLUCOSE mg/dL 206* 216* 164* 138* 143*   ALBUMIN g/dL  --   --   --   --  3.70   BILIRUBIN mg/dL  --   --   --   --  0.6   ALK PHOS U/L  --   --   --   --  166*   AST (SGOT) U/L  --   --   --   --  14   ALT (SGPT) U/L  --   --   --   --  11         BNP        TROPONIN  Results from last 7 days   Lab Units 10/15/21  0610   TROPONIN T ng/mL <0.010       CoAg  Results from last 7 days   Lab Units 10/16/21  0544   INR  1.01       Creatinine Clearance  Estimated Creatinine Clearance: 86.7 mL/min (by C-G formula based on SCr of 1.01 mg/dL).    ABG        Radiology  No radiology results for the last day            EKG            I personally viewed and interpreted the patient's EKG/Telemetry data: Pacemaker rhythm.  Underlying rhythm is atrial  fibrillation.    ECHOCARDIOGRAM:    Results for orders placed during the hospital encounter of 10/14/21    Adult Transthoracic Echo Complete W/ Cont if Necessary Per Protocol    Interpretation Summary  · Estimated left ventricular EF = 55% Left ventricular systolic function is normal.    Indications  Chest pain    Technically satisfactory study.  Mitral valve is structurally normal.  Tricuspid valve is structurally normal.  Aortic valve is structurally normal.  Pulmonic valve could not be well visualized.  No evidence for mitral tricuspid or aortic regurgitation is seen by Doppler study.  Left atrium is normal in size.  Right atrium is normal in size.  Left ventricle is normal in size and contractility with ejection fraction of 55 %.  Right ventricle is normal in size.  Atrial septum is intact.  Aorta is normal.  No pericardial effusion or intracardiac thrombus is seen.    Impression  Structurally and functionally normal cardiac valves.  Left ventricular size and contractility is normal with ejection fraction of 55 %.          STRESS MYOVIEW:    Cardiolite (Tc-99m Sestamibi) stress test    CARDIAC CATHETERIZATION:            OTHER:         Assessment/Plan     Active Problems:    Atrial fibrillation (HCC)    CHF due to valvular disease (HCC)    Presence of coronary angioplasty implant and graft    Benign hypertension with chronic kidney disease    AV block, complete (HCC)    Angina of effort (HCC)    Chronic renal insufficiency    Ro Nathan is a 76 y.o. male who presents with history of multiple cardiac and noncardiac problems is documented in the assessment and plan was admitted to the hospital with history of chest pain for last 2 weeks.  Patient has been having chest pain almost every day and has been taking 1-2 nitroglycerin every day.  Sometimes with exertion sometimes at rest.  Patient recently was seen in the office and was scheduled to have stress Cardiolite test.  However patient has significant  symptoms and patient came to the emergency room.  EKG showed no acute changes.  Patient denies having any fever cough chills.  Chest discomfort is substernal heaviness and tightness without any radiation of the discomfort into the neck or into the arms.  No associated sweating nausea vomiting or shortness of breath.         ]]]]]]]]]]]]]]]]]]]]]  Impression  ========  -Exertional and nonexertional chest discomfort suggestive of angina pectoris.     -Status post stent placement   Status post stent to proximal LAD 12/5/2019.  Patient had stent to circumflex and RCA in the past.     Cardiac catheterization 12/5/2019 by Dr. Munson  Left ventriculography  The overall estimated left ventricular ejection fraction was 65%.  Native coronary arteriography: Left dominant circulation     1.  Left main coronary arteries a large-caliber vessel gives rise left anterior descending left circumflex flex arteries.  There is an ostial eccentric 40% lesion that appears angiographically to approach 50%. No significant coronary atherosclerotic disease present.  2.  Left anterior descending artery is a large-caliber vessel that gives rise to large caliber diagonal branches and courses along the anterior interventricular groove and wraps around the apex.  There is a proximal eccentric 50% lesion within an in-stent restenotic segment followed by an greater than 80% focal eccentric in-stent restenotic lesion followed by a 60% lesion after the stented segment and otherwise no coronary atherosclerotic disease of any significance present.  3.  The left circumflex arteries a large-caliber vessel gives rise to large caliber bifurcating obtuse marginal branch.  There is an ostial proximal concentric 50% lesion that is calcified that immediately gives rise to a very high first obtuse marginal branch that is best classified as a ramus intermedius branch supplying large territory in the anterolateral wall that has an ostial proximal 70% lesion.  The  calcified continuing circumflex and the posterior AV groove gives a second obtuse marginal branch that has mild diffuse disease, a third obtuse marginal branch and then a bifurcating system that has a bifurcation stenting within it before giving rise to the left PDA.  There is diffuse 50% in-stent restenosis within this bifurcation stented segment into the obtuse marginal branch which itself has diffuse 50 to 60% disease within the stented segment.  There is a concentric 80% lesion in the circumflex PDA.  No significant coronary atherosclerotic disease present  4.  The right coronary arteries a large-caliber nondominant vessel has a stented mid segment after which there several acute marginal branches there is 50% in-stent restenosis approaching 60% at some portions within this nondominant right proximal to mid proximal segment.     Conclusions:     1.  Normal left heart filling pressures with preserved LV systolic function  2.  Normal epicardial anatomy with severe two-vessel coronary artery disease involving what is likely the culprit for the patient's chest pain syndrome, namely the very severe proximal LAD lesions.  We will treat the remaining circumflex lesions with medical therapy and see if the patient's symptoms resolved; this includes the severe lesion within the proximal portion of the high obtuse marginal branch within the circumflex system as well.     -Status post permanent ventricular pacemaker implantation ([employer] 1/13/2015)  Battery status is 4.5 years.     -History of fever and sepsis.  Positive blood cultures for coagulase-negative staph.      EMBER 7/22/2021 revealed  No evidence for valvular vegetations is present.  Significant mitral regurgitation with central jet extending to the lateral wall and into the superior aspect of the left atrium.  Mild tricuspid regurgitation.  Calculated pulmonary artery pressure is 25 mmHg.  Left atrial and left atrial appendage enlargement is present without  clot.  Normal left ventricle size and contractility with ejection fraction of 60%.     -Dyslipidemia diabetes hypertension COPD CKD 3.  History of cirrhosis     -Chronic atrial fibrillation     -History of right foot ulceration with bone involvement.     -Peripheral vascular disease     -Large abdominal ventral hernia.     -Status post appendectomy appendectomy cholecystectomy left elbow surgery hernia repair     -Allergic to morphine pantoprazole haloperidol  ===========  Plan  ========  Patient presents to the hospital with increasing symptoms of chest pain recently.  Patient is taking 1 or 2 nitroglycerin every day.  Patient also has shortness of breath.  Echocardiogram showed normal left ventricle function.  Mild mitral regurgitation.    Status post stent 12/5/2019.  Stress Cardiolite test showed inferior and apical ischemic changes.  EKG showed ventricular pacemaker rhythm.  Intrinsic rhythm is atrial fibrillation.    Patient had chest discomfort this morning.-New problem  EKG showed no acute changes.  Troponin levels are negative    Status post pacemaker.  Patient had recently interrogation of the pacemaker today revealed good pacing parameters with battery status of 4.5 years.   Patient is almost 99.7% paced in the ventricle.     Chronic atrial fibrillation.-Rate controlled     Anticoagulation was reviewed.  Patient was on Eliquis  Eliquis is on hold.    Hypertension-113/77     Dyslipidemia-continue atorvastatin     Patient to have EMBER cardiac catheterization and coronary arteriography today.  Risks and benefits pros and cons of the procedure including infection bleeding blood clot heart attack stroke allergic reaction to the dye renal dysfunction etc. were discussed with patient.     Further plan will depend on patient's progress.  ]]]]]]]]]]]]]]]]]]             Celine Swanson MD  10/18/21  06:15 EDT

## 2021-10-19 ENCOUNTER — APPOINTMENT (OUTPATIENT)
Dept: CARDIOLOGY | Facility: HOSPITAL | Age: 76
End: 2021-10-19

## 2021-10-19 LAB
ANION GAP SERPL CALCULATED.3IONS-SCNC: 11 MMOL/L (ref 5–15)
ANION GAP SERPL CALCULATED.3IONS-SCNC: 7 MMOL/L (ref 5–15)
BASOPHILS # BLD AUTO: 0.1 10*3/MM3 (ref 0–0.2)
BASOPHILS NFR BLD AUTO: 1.1 % (ref 0–1.5)
BUN SERPL-MCNC: 18 MG/DL (ref 8–23)
BUN SERPL-MCNC: 22 MG/DL (ref 8–23)
BUN/CREAT SERPL: 16.4 (ref 7–25)
BUN/CREAT SERPL: 16.5 (ref 7–25)
CALCIUM SPEC-SCNC: 8.3 MG/DL (ref 8.6–10.5)
CALCIUM SPEC-SCNC: 8.6 MG/DL (ref 8.6–10.5)
CHLORIDE SERPL-SCNC: 98 MMOL/L (ref 98–107)
CHLORIDE SERPL-SCNC: 98 MMOL/L (ref 98–107)
CO2 SERPL-SCNC: 23 MMOL/L (ref 22–29)
CO2 SERPL-SCNC: 26 MMOL/L (ref 22–29)
CREAT SERPL-MCNC: 1.1 MG/DL (ref 0.76–1.27)
CREAT SERPL-MCNC: 1.33 MG/DL (ref 0.76–1.27)
DEPRECATED RDW RBC AUTO: 56 FL (ref 37–54)
EOSINOPHIL # BLD AUTO: 0.2 10*3/MM3 (ref 0–0.4)
EOSINOPHIL NFR BLD AUTO: 3.6 % (ref 0.3–6.2)
ERYTHROCYTE [DISTWIDTH] IN BLOOD BY AUTOMATED COUNT: 18.9 % (ref 12.3–15.4)
GFR SERPL CREATININE-BSD FRML MDRD: 52 ML/MIN/1.73
GFR SERPL CREATININE-BSD FRML MDRD: 65 ML/MIN/1.73
GLUCOSE BLDC GLUCOMTR-MCNC: 217 MG/DL (ref 70–105)
GLUCOSE BLDC GLUCOMTR-MCNC: 250 MG/DL (ref 70–105)
GLUCOSE SERPL-MCNC: 191 MG/DL (ref 65–99)
GLUCOSE SERPL-MCNC: 236 MG/DL (ref 65–99)
HCT VFR BLD AUTO: 33.3 % (ref 37.5–51)
HGB BLD-MCNC: 11.2 G/DL (ref 13–17.7)
LYMPHOCYTES # BLD AUTO: 1.1 10*3/MM3 (ref 0.7–3.1)
LYMPHOCYTES NFR BLD AUTO: 18.1 % (ref 19.6–45.3)
MCH RBC QN AUTO: 28.7 PG (ref 26.6–33)
MCHC RBC AUTO-ENTMCNC: 33.8 G/DL (ref 31.5–35.7)
MCV RBC AUTO: 85.1 FL (ref 79–97)
MONOCYTES # BLD AUTO: 0.7 10*3/MM3 (ref 0.1–0.9)
MONOCYTES NFR BLD AUTO: 11.4 % (ref 5–12)
NEUTROPHILS NFR BLD AUTO: 3.9 10*3/MM3 (ref 1.7–7)
NEUTROPHILS NFR BLD AUTO: 65.8 % (ref 42.7–76)
NRBC BLD AUTO-RTO: 0.1 /100 WBC (ref 0–0.2)
PLATELET # BLD AUTO: 319 10*3/MM3 (ref 140–450)
PMV BLD AUTO: 8.1 FL (ref 6–12)
POTASSIUM SERPL-SCNC: 4.9 MMOL/L (ref 3.5–5.2)
POTASSIUM SERPL-SCNC: 5.4 MMOL/L (ref 3.5–5.2)
RBC # BLD AUTO: 3.91 10*6/MM3 (ref 4.14–5.8)
SODIUM SERPL-SCNC: 131 MMOL/L (ref 136–145)
SODIUM SERPL-SCNC: 132 MMOL/L (ref 136–145)
WBC # BLD AUTO: 5.9 10*3/MM3 (ref 3.4–10.8)

## 2021-10-19 PROCEDURE — 94799 UNLISTED PULMONARY SVC/PX: CPT

## 2021-10-19 PROCEDURE — 80048 BASIC METABOLIC PNL TOTAL CA: CPT | Performed by: INTERNAL MEDICINE

## 2021-10-19 PROCEDURE — 99233 SBSQ HOSP IP/OBS HIGH 50: CPT | Performed by: INTERNAL MEDICINE

## 2021-10-19 PROCEDURE — 82962 GLUCOSE BLOOD TEST: CPT

## 2021-10-19 PROCEDURE — 25010000002 HYDROMORPHONE PER 4 MG: Performed by: INTERNAL MEDICINE

## 2021-10-19 PROCEDURE — 85025 COMPLETE CBC W/AUTO DIFF WBC: CPT | Performed by: FAMILY MEDICINE

## 2021-10-19 PROCEDURE — 25010000002 HYDROMORPHONE PER 4 MG: Performed by: FAMILY MEDICINE

## 2021-10-19 RX ORDER — HYDROMORPHONE HCL 110MG/55ML
0.25 PATIENT CONTROLLED ANALGESIA SYRINGE INTRAVENOUS EVERY 4 HOURS PRN
Status: DISCONTINUED | OUTPATIENT
Start: 2021-10-19 | End: 2021-10-21 | Stop reason: HOSPADM

## 2021-10-19 RX ORDER — BENZONATATE 100 MG/1
100 CAPSULE ORAL 3 TIMES DAILY PRN
Status: DISCONTINUED | OUTPATIENT
Start: 2021-10-19 | End: 2021-10-21 | Stop reason: HOSPADM

## 2021-10-19 RX ORDER — ISOSORBIDE MONONITRATE 60 MG/1
60 TABLET, EXTENDED RELEASE ORAL
Status: DISCONTINUED | OUTPATIENT
Start: 2021-10-19 | End: 2021-10-21 | Stop reason: HOSPADM

## 2021-10-19 RX ORDER — METOPROLOL SUCCINATE 25 MG/1
25 TABLET, EXTENDED RELEASE ORAL
Status: DISCONTINUED | OUTPATIENT
Start: 2021-10-19 | End: 2021-10-21 | Stop reason: HOSPADM

## 2021-10-19 RX ADMIN — HYDROMORPHONE HYDROCHLORIDE 0.25 MG: 2 INJECTION INTRAMUSCULAR; INTRAVENOUS; SUBCUTANEOUS at 15:06

## 2021-10-19 RX ADMIN — METOPROLOL SUCCINATE 25 MG: 25 TABLET, EXTENDED RELEASE ORAL at 11:55

## 2021-10-19 RX ADMIN — POTASSIUM CHLORIDE 10 MEQ: 750 TABLET, EXTENDED RELEASE ORAL at 11:55

## 2021-10-19 RX ADMIN — HYDROMORPHONE HYDROCHLORIDE 0.5 MG: 2 INJECTION INTRAMUSCULAR; INTRAVENOUS; SUBCUTANEOUS at 05:28

## 2021-10-19 RX ADMIN — ATORVASTATIN CALCIUM 40 MG: 40 TABLET, FILM COATED ORAL at 07:08

## 2021-10-19 RX ADMIN — PREGABALIN 150 MG: 75 CAPSULE ORAL at 07:07

## 2021-10-19 RX ADMIN — BUDESONIDE 0.5 MG: 0.5 SUSPENSION RESPIRATORY (INHALATION) at 21:33

## 2021-10-19 RX ADMIN — CLOPIDOGREL BISULFATE 75 MG: 75 TABLET ORAL at 07:07

## 2021-10-19 RX ADMIN — HYDROMORPHONE HYDROCHLORIDE 0.5 MG: 2 INJECTION INTRAMUSCULAR; INTRAVENOUS; SUBCUTANEOUS at 02:29

## 2021-10-19 RX ADMIN — HYDROMORPHONE HYDROCHLORIDE 0.25 MG: 2 INJECTION INTRAMUSCULAR; INTRAVENOUS; SUBCUTANEOUS at 09:56

## 2021-10-19 RX ADMIN — IPRATROPIUM BROMIDE AND ALBUTEROL SULFATE 3 ML: 2.5; .5 SOLUTION RESPIRATORY (INHALATION) at 08:28

## 2021-10-19 RX ADMIN — Medication 3 ML: at 22:10

## 2021-10-19 RX ADMIN — Medication 3 ML: at 07:11

## 2021-10-19 RX ADMIN — IPRATROPIUM BROMIDE AND ALBUTEROL SULFATE 3 ML: 2.5; .5 SOLUTION RESPIRATORY (INHALATION) at 21:33

## 2021-10-19 RX ADMIN — PREGABALIN 150 MG: 75 CAPSULE ORAL at 22:11

## 2021-10-19 RX ADMIN — ROPINIROLE HYDROCHLORIDE 0.5 MG: 0.25 TABLET, FILM COATED ORAL at 22:11

## 2021-10-19 RX ADMIN — ISOSORBIDE MONONITRATE 60 MG: 60 TABLET, EXTENDED RELEASE ORAL at 11:55

## 2021-10-19 RX ADMIN — ASPIRIN 81 MG: 81 TABLET, COATED ORAL at 07:08

## 2021-10-19 RX ADMIN — BUDESONIDE 0.5 MG: 0.5 SUSPENSION RESPIRATORY (INHALATION) at 08:28

## 2021-10-19 RX ADMIN — POTASSIUM CHLORIDE 10 MEQ: 750 TABLET, EXTENDED RELEASE ORAL at 07:08

## 2021-10-19 RX ADMIN — HYDROMORPHONE HYDROCHLORIDE 0.25 MG: 2 INJECTION INTRAMUSCULAR; INTRAVENOUS; SUBCUTANEOUS at 19:11

## 2021-10-19 RX ADMIN — FUROSEMIDE 20 MG: 20 TABLET ORAL at 07:08

## 2021-10-19 RX ADMIN — TRAZODONE HYDROCHLORIDE 100 MG: 100 TABLET ORAL at 22:11

## 2021-10-19 NOTE — PROGRESS NOTES
LOS: 1 day   Patient Care Team:  Yusuf Jones MD as PCP - General  PhiJoshua MD as Consulting Physician (Cardiology)  Shirley, Moise Kathleen MD as Consulting Physician (Cardiac Electrophysiology)  Izzy Alvarez MD as Consulting Physician (Nephrology)  Celine Swanson MD as Consulting Physician (Cardiology)    Subjective:  He continues to have substernal chest pain    Objective:   Afebrile//coronary catheterization results reviewed      Review of Systems:   Review of Systems   Constitutional: Positive for activity change.   Respiratory: Positive for cough, chest tightness and shortness of breath.    Cardiovascular: Positive for chest pain.   Neurological: Positive for weakness.           Vital Signs  Temp:  [97.6 °F (36.4 °C)-98.1 °F (36.7 °C)] 98.1 °F (36.7 °C)  Heart Rate:  [68-84] 70  Resp:  [16-21] 17  BP: (102-180)/() 104/50    Physical Exam:  Physical Exam  Constitutional:       Appearance: Normal appearance.   Cardiovascular:      Rate and Rhythm: Normal rate.      Heart sounds: Normal heart sounds.   Pulmonary:      Breath sounds: Normal breath sounds.   Abdominal:      Palpations: Abdomen is soft.   Skin:     General: Skin is warm.   Neurological:      Mental Status: He is alert.          Radiology:  XR Chest 2 View    Result Date: 10/14/2021   1. Borderline cardiomegaly. 2. No active pulmonary disease.  Electronically Signed By-Yusuf Gray MD On:10/14/2021 4:33 PM This report was finalized on [id number] by  Yusuf Gray MD.         Results Review:     I reviewed the patient's new clinical results.  I reviewed the patient's new imaging results and agree with the interpretation.    Medication Review:   Scheduled Meds:[MAR Hold] Acetylcysteine, 600 mg, Oral, BID  aspirin, 81 mg, Oral, Daily  atorvastatin, 40 mg, Oral, QAM  budesonide, 0.5 mg, Nebulization, BID - RT  clopidogrel, 75 mg, Oral, QAM  furosemide, 20 mg, Oral, QAM  losartan, 50 mg, Oral,  QAM  mirtazapine, 30 mg, Oral, Nightly  potassium chloride, 10 mEq, Oral, TID With Meals  pregabalin, 150 mg, Oral, BID  rOPINIRole, 0.5 mg, Oral, Nightly  sodium chloride, 3 mL, Intravenous, Q12H  traZODone, 100 mg, Oral, Nightly      Continuous Infusions:   PRN Meds:.•  acetaminophen  •  acetaminophen  •  atropine  •  diphenhydrAMINE  •  HYDROmorphone  •  influenza vaccine  •  ipratropium-albuterol  •  nitroglycerin  •  ondansetron  •  ondansetron **OR** ondansetron  •  oxyCODONE  •  [COMPLETED] Insert peripheral IV **AND** sodium chloride  •  sodium chloride  •  sodium chloride    Labs:    CBC    Results from last 7 days   Lab Units 10/19/21  0541 10/18/21  0503 10/17/21  1451 10/16/21  0544 10/15/21  0610 10/14/21  1633   WBC 10*3/mm3 5.90 6.30 6.30 5.60 7.90 8.10   HEMOGLOBIN g/dL 11.2* 11.4* 12.8* 11.7* 12.2* 12.1*   PLATELETS 10*3/mm3 319 297 350 296 310 304     BMP   Results from last 7 days   Lab Units 10/19/21  0533 10/18/21  0503 10/17/21  1451 10/16/21  0544 10/15/21  0610 10/14/21  1633   SODIUM mmol/L 131* 135* 134* 134* 138 135*   POTASSIUM mmol/L 4.9 4.8 4.3 4.1 4.3 4.6   CHLORIDE mmol/L 98 101 96* 100 100 99   CO2 mmol/L 26.0 24.0 23.0 22.0 25.0 24.0   BUN mg/dL 18 20 22 23 19 21   CREATININE mg/dL 1.10 1.01 1.01 1.07 1.09 1.07   GLUCOSE mg/dL 191* 206* 216* 164* 138* 143*   MAGNESIUM mg/dL  --  2.2  --   --  1.9  --      Cr Clearance Estimated Creatinine Clearance: 80.2 mL/min (by C-G formula based on SCr of 1.1 mg/dL).  Coag   Results from last 7 days   Lab Units 10/16/21  0544   INR  1.01     HbA1C   Lab Results   Component Value Date    HGBA1C 6.7 (H) 09/28/2021    HGBA1C 9.0 (H) 07/28/2021    HGBA1C 9.3 (H) 07/16/2021     Blood Glucose   Glucose   Date/Time Value Ref Range Status   10/18/2021 1951 164 (H) 70 - 105 mg/dL Final     Comment:     Serial Number: [id number]:  [id number]   10/18/2021 1545 148 (H) 70 - 105 mg/dL Final     Comment:     Serial Number: [id number]:  [id number]      Infection     CMP   Results from last 7 days   Lab Units 10/19/21  0533 10/18/21  0503 10/17/21  1451 10/16/21  0544 10/15/21  0610 10/14/21  1633   SODIUM mmol/L 131* 135* 134* 134* 138 135*   POTASSIUM mmol/L 4.9 4.8 4.3 4.1 4.3 4.6   CHLORIDE mmol/L 98 101 96* 100 100 99   CO2 mmol/L 26.0 24.0 23.0 22.0 25.0 24.0   BUN mg/dL 18 20 22 23 19 21   CREATININE mg/dL 1.10 1.01 1.01 1.07 1.09 1.07   GLUCOSE mg/dL 191* 206* 216* 164* 138* 143*   ALBUMIN g/dL  --   --   --   --   --  3.70   BILIRUBIN mg/dL  --   --   --   --   --  0.6   ALK PHOS U/L  --   --   --   --   --  166*   AST (SGOT) U/L  --   --   --   --   --  14   ALT (SGPT) U/L  --   --   --   --   --  11     UA      Radiology(recent) No radiology results for the last day   Assessment:    Substernal chest pain secondary to unstable angina pectoris  Status post elective coronary catheterization 10/18/2021 with defecation of proximal 99% disease of the ramus intermedius, circumflex with ostial 80 to 90% disease, first marginal branch with 99% disease, PDA with 90% disease and RCA with 95% disease within the stent in the proximal segment  Hyponatremia  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  Diabetes mellitus type 2 with renal manifestations  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  Diabetic dyslipidemia  Chronic oral anticoagulation  Chronic kidney disease stage IIIa  History of intermittent bowel obstruction secondary to massive ventral hernia  History of right renal mass  History of 4 cm infrarenal abdominal aortic aneurysm  Opiate dependency  Hypertension associated chronic kidney disease stage IIIa  Panlobular COPD with emphysema  Morbid exogenous obesity  Obesity hypoventilation syndrome  Monoclonal paraproteinemia  Valvular heart disease with significant mitral regurgitation  Mild tricuspid regurgitation  Paroxysmal atrial fibrillation  Hypercoagulable state secondary to  atrial fibrillation  Restless leg syndrome  Supplemental oxygen dependency  Dependency upon noninvasive mechanical ventilator  History of prostate cancer  Atherosclerotic heart disease of native coronary arteries with angina pectoris  Chronic mucopurulent bronchitis  Hypoventilation apnea syndrome with ARTI  Herbert's esophageal change  Gastroesophageal reflux disease with esophagitis  Liver cirrhosis  History of alcoholism  Nicotine dependency with nicotine use disorder, cigarettes  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  Autonomic neuropathy secondary to diabetes  Peripheral vascular disease  B12 deficiency  Vitamin D deficiency  Shape pacemaker placement  History of percutaneous coronary convention with stent placement    Plan:  Evaluation for potential coronary artery bypass grafting and MVR        Yusuf Jones MD  10/19/21  06:45 EDT

## 2021-10-19 NOTE — PROGRESS NOTES
Referring Provider: Yusuf Jones MD    Reason for follow-up:  Unstable angina  Status post stent  Chronic atrial fibrillation  Status post pacemaker       Patient Care Team:  Yusuf Jones MD as PCP - Joshua Evangelista MD as Consulting Physician (Cardiology)  Moise Watson MD as Consulting Physician (Cardiac Electrophysiology)  Izzy Alvarez MD as Consulting Physician (Nephrology)  Celine Swanson MD as Consulting Physician (Cardiology)    Subjective .  Feeling okay.  ROS    Patient is not having any chest discomfort, shortness of breath, palpitations, dizziness or syncope.  Denies having any headache ,abdominal pain ,nausea, vomiting , diarrhea constipation, loss of weight or loss of appetite.  Denies having any excessive bruising ,hematuria or blood in the stool.    Review of all systems negative except as indicated    History  Past Medical History:   Diagnosis Date   • Alcoholic cirrhosis of liver with ascites ([facility]) 10/26/2020   • Benign hypertension with CKD (chronic kidney disease) stage III ([facility]) 10/26/2020   • Bruises easily    • CHF (congestive heart failure) ([facility])    • Chronic bronchitis with COPD (chronic obstructive pulmonary disease) ([facility]) 10/26/2020   • Chronic respiratory failure with hypoxia ([facility]) 10/26/2020   • Congenital heart defect    • Difficulty attaining erection    • Hypertension    • Low back pain    • Pacemaker    • Peripheral vascular disease due to secondary diabetes ([facility]) 10/26/2020   • Poor circulation    • Prostate cancer ([facility])     prostate   • Shortness of breath    • Sleep apnea     using CPAP    • Stage 3a chronic kidney disease ([facility]) 10/26/2020   • Stented coronary artery 12/05/2019    MICHAEL to LAD   • Type 2 diabetes, controlled, with neuropathy ([facility]) 10/26/2020    no meds   • Type 2 diabetes, controlled, with neuropathy ([facility]) 10/26/2020       Past Surgical History:   Procedure Laterality Date   • ABDOMINAL SURGERY     • APPENDECTOMY      • BONE CURETTAGE Right 1/22/2021    Procedure: Wound excision with fifth metatarsal head resection, right foot.;  Surgeon: Josef Egan DPM;  Location: [facility] MAIN OR;  Service: Podiatry;  Laterality: Right;   • BONE INCISION AND DRAINAGE Right 10/5/2020    Procedure: Incision and drainage with debridement of all nonviable soft tissue and bone with bone biopsy, right foot.;  Surgeon: Josef Egan DPM;  Location: [facility] MAIN OR;  Service: Podiatry;  Laterality: Right;   • CARDIAC CATHETERIZATION N/A 12/5/2019    Procedure: Left Heart Cath;  Surgeon: Mirza Munson MD;  Location: [facility] CATH INVASIVE LOCATION;  Service: Cardiology   • CARDIAC CATHETERIZATION N/A 12/5/2019    Procedure: Stent MICHAEL coronary;  Surgeon: Mirza Munson MD;  Location: [facility] INVASIVE LOCATION;  Service: Cardiology   • CHOLECYSTECTOMY     • ELBOW PROCEDURE      left   • FOOT SURGERY      right foot   • HERNIA REPAIR     • INCISION AND DRAINAGE OF WOUND Right 4/6/2021    Procedure: INCISION AND DRAINAGE OF RIGHT FOOT 5TH METATARSAL TO BONE AND PARTIAL 5TH METATARSAL RESECTION;  Surgeon: Josef Egan DPM;  Location: [facility] MAIN OR;  Service: Podiatry;  Laterality: Right;   • INCISION AND DRAINAGE OF WOUND Right 4/8/2021    Procedure: INCISION AND DRAINAGE BONE, DELAYED PRIMARY CLOSURE;  Surgeon: Josef Egan DPM;  Location: [facility] MAIN OR;  Service: Podiatry;  Laterality: Right;   • INTERVENTIONAL RADIOLOGY PROCEDURE N/A 12/5/2019    Procedure: Intravascular Ultrasound;  Surgeon: Mirza Munson MD;  Location: [facility] INVASIVE LOCATION;  Service: Cardiology   • PACEMAKER IMPLANTATION     • OK RT/LT HEART CATHETERS N/A 12/5/2019    Procedure: Percutaneous Coronary Intervention;  Surgeon: Mirza Munson MD;  Location: [facility] CATH INVASIVE LOCATION;  Service: Cardiology   • WOUND DEBRIDEMENT Right 10/29/2020    Procedure: Preparation and debridement of foot wound  with application of Integra wound graft, right foot.;  Surgeon: Josef Egan DPM;  Location: [facility] MAIN OR;  Service: Podiatry;  Laterality: Right;   • WOUND DEBRIDEMENT N/A 2021    Procedure: EXCISIONAL ABDOMINAL WOUND  DEBRIDEMENT;  Surgeon: Cleopatra Wang MD;  Location: [facility] MAIN OR;  Service: General;  Laterality: N/A;   • WOUND DEBRIDEMENT N/A 2021    Procedure: DEBRIDEMENT OF ABDOMINAL WALL;  Surgeon: Hill Bhatia DO;  Location: [facility] MAIN OR;  Service: General;  Laterality: N/A;       Family History   Problem Relation Age of Onset   • Alzheimer's disease Mother    • GI problems Father    • Heart disease Father        Social History     Tobacco Use   • Smoking status: Former Smoker     Years: 15.00     Types: Cigarettes     Start date: 1966     Quit date: 5/10/2020     Years since quittin.4   • Smokeless tobacco: Never Used   Vaping Use   • Vaping Use: Never used   Substance Use Topics   • Alcohol use: Yes     Alcohol/week: 4.0 standard drinks     Types: 4 Shots of liquor per week     Comment: maybe 4 shots per month   • Drug use: No        Medications Prior to Admission   Medication Sig Dispense Refill Last Dose   • apixaban (ELIQUIS) 5 MG tablet tablet Take 5 mg by mouth 2 (Two) Times a Day.   10/14/2021 at Unknown time   • aspirin 81 MG EC tablet Take 1 tablet by mouth Daily. 30 tablet 5 10/14/2021 at Unknown time   • atorvastatin (LIPITOR) 40 MG tablet Take 40 mg by mouth Every Morning.   10/14/2021 at Unknown time   • budesonide (PULMICORT) 0.5 MG/2ML nebulizer solution Take 0.5 mg by nebulization 2 (Two) Times a Day As Needed.   10/14/2021 at Unknown time   • clopidogrel (PLAVIX) 75 MG tablet Take 75 mg by mouth Every Morning.   10/14/2021 at Unknown time   • furosemide (LASIX) 20 MG tablet Take 20 mg by mouth Every Morning.   10/14/2021 at Unknown time   • ipratropium-albuterol (DUO-NEB) 0.5-2.5 mg/3 ml nebulizer 3 mL 4 (Four) Times a Day As Needed.   10/14/2021 at Unknown  time   • losartan (COZAAR) 50 MG tablet Take 50 mg by mouth Every Morning.   10/14/2021 at Unknown time   • metoprolol tartrate (LOPRESSOR) 25 MG tablet Take 25 mg by mouth 2 (Two) Times a Day.   10/14/2021 at Unknown time   • nitroglycerin (NITROSTAT) 0.4 MG SL tablet Place 0.4 mg under the tongue Every 5 (Five) Minutes As Needed for Chest Pain.   10/14/2021 at Unknown time   • Perforomist 20 MCG/2ML nebulizer solution 20 mcg 2 (Two) Times a Day As Needed.   10/14/2021 at Unknown time   • potassium chloride (K-DUR) 10 MEQ CR tablet Take 10 mEq by mouth 3 (Three) Times a Day With Meals.   10/14/2021 at Unknown time   • pregabalin (LYRICA) 150 MG capsule TAKE ONE CAPSULE BY MOUTH TWICE A DAY 60 capsule 2 10/14/2021 at Unknown time   • rOPINIRole (REQUIP) 0.5 MG tablet Take 0.5 mg by mouth Every Night.   10/13/2021 at Unknown time   • VENTOLIN  (90 Base) MCG/ACT inhaler Inhale 2 puffs Every 6 (Six) Hours As Needed for Wheezing or Shortness of Air.   Past Month at Unknown time   • miconazole (MICOTIN) 2 % cream Apply 1 application topically to the appropriate area as directed Every 12 (Twelve) Hours. 60 g 1 Unknown at Unknown time   • mirtazapine (REMERON) 30 MG tablet Take 30 mg by mouth Every Night.   Unknown at Unknown time   • oxyCODONE-acetaminophen (Percocet)  MG per tablet Take 1 tablet by mouth Every 6 (Six) Hours As Needed for Moderate Pain . 120 tablet 0        Allergies  Haloperidol, Morphine, Pantoprazole, and Latex    Scheduled Meds:[MAR Hold] Acetylcysteine, 600 mg, Oral, BID  aspirin, 81 mg, Oral, Daily  atorvastatin, 40 mg, Oral, QAM  budesonide, 0.5 mg, Nebulization, BID - RT  clopidogrel, 75 mg, Oral, QAM  furosemide, 20 mg, Oral, QAM  losartan, 50 mg, Oral, QAM  mirtazapine, 30 mg, Oral, Nightly  potassium chloride, 10 mEq, Oral, TID With Meals  pregabalin, 150 mg, Oral, BID  rOPINIRole, 0.5 mg, Oral, Nightly  sodium chloride, 3 mL, Intravenous, Q12H  traZODone, 100 mg, Oral,  "Nightly      Continuous Infusions:   PRN Meds:.•  acetaminophen  •  acetaminophen  •  atropine  •  diphenhydrAMINE  •  HYDROmorphone  •  influenza vaccine  •  ipratropium-albuterol  •  nitroglycerin  •  ondansetron  •  ondansetron **OR** ondansetron  •  oxyCODONE  •  [COMPLETED] Insert peripheral IV **AND** sodium chloride  •  sodium chloride  •  sodium chloride    Objective     VITAL SIGNS  Vitals:    10/19/21 0300 10/19/21 0400 10/19/21 0500 10/19/21 0600   BP: 111/52 132/57 108/41 104/50   Pulse: 70 84 70 70   Resp:       Temp:       TempSrc:       SpO2: 97% 99% 100% 99%   Weight:       Height:           Flowsheet Rows      First Filed Value   Admission Height 182.9 cm (72\") Documented at 10/14/2021 1532   Admission Weight 131 kg (288 lb 12.8 oz) Documented at 10/14/2021 1532            Intake/Output Summary (Last 24 hours) at 10/19/2021 0642  Last data filed at 10/18/2021 2030  Gross per 24 hour   Intake 240 ml   Output --   Net 240 ml        TELEMETRY: Pacemaker rhythm    Physical Exam:  The patient is alert, oriented and in no distress.  Vital signs as noted above.  Exogenous obesity.  Head and neck revealed no carotid bruits or jugular venous distention.  No thyromegaly or lymphadenopathy is present  Lungs clear.  No wheezing.  Breath sounds are normal bilaterally.  Heart normal first and second heart sounds.  No murmur. No precordial rub is present.  No gallop is present.  Abdomen soft and nontender.  No organomegaly is present.  Significant ventral hernia.  Extremities with good peripheral pulses without any pedal edema.  Skin warm and dry.  Musculoskeletal system is grossly normal  CNS grossly normal      Results Review:   I reviewed the patient's new clinical results.  Lab Results (last 24 hours)     Procedure Component Value Units Date/Time    Basic Metabolic Panel [[id number]]  (Abnormal) Collected: 10/19/21 0533    Specimen: Blood Updated: 10/19/21 0633     Glucose 191 mg/dL      BUN 18 mg/dL      " Creatinine 1.10 mg/dL      Sodium 131 mmol/L      Potassium 4.9 mmol/L      Comment: Slight hemolysis detected by analyzer. Results may be affected.        Chloride 98 mmol/L      CO2 26.0 mmol/L      Calcium 8.3 mg/dL      eGFR Non African Amer 65 mL/min/1.73      BUN/Creatinine Ratio 16.4     Anion Gap 7.0 mmol/L     Narrative:      GFR Normal >60  Chronic Kidney Disease <60  Kidney Failure <15      CBC & Differential [[id number]]  (Abnormal) Collected: 10/19/21 0541    Specimen: Blood Updated: 10/19/21 0607    Narrative:      The following orders were created for panel order CBC & Differential.  Procedure                               Abnormality         Status                     ---------                               -----------         ------                     CBC Auto Differential[[id number]]        Abnormal            Final result                 Please view results for these tests on the individual orders.    CBC Auto Differential [[id number]]  (Abnormal) Collected: 10/19/21 0541    Specimen: Blood Updated: 10/19/21 0607     WBC 5.90 10*3/mm3      RBC 3.91 10*6/mm3      Hemoglobin 11.2 g/dL      Hematocrit 33.3 %      MCV 85.1 fL      MCH 28.7 pg      MCHC 33.8 g/dL      RDW 18.9 %      RDW-SD 56.0 fl      MPV 8.1 fL      Platelets 319 10*3/mm3      Neutrophil % 65.8 %      Lymphocyte % 18.1 %      Monocyte % 11.4 %      Eosinophil % 3.6 %      Basophil % 1.1 %      Neutrophils, Absolute 3.90 10*3/mm3      Lymphocytes, Absolute 1.10 10*3/mm3      Monocytes, Absolute 0.70 10*3/mm3      Eosinophils, Absolute 0.20 10*3/mm3      Basophils, Absolute 0.10 10*3/mm3      nRBC 0.1 /100 WBC     POC Glucose Once [[id number]]  (Abnormal) Collected: 10/18/21 1951    Specimen: Blood Updated: 10/18/21 1952     Glucose 164 mg/dL      Comment: Serial Number: [id number]:  [id number]       POC Glucose Once [[id number]]  (Abnormal) Collected: 10/18/21 1545    Specimen: Blood Updated: 10/18/21 1546     Glucose 148 mg/dL       Comment: Serial Number: [id number]:  [id number]       Troponin [[id number]]  (Normal) Collected: 10/18/21 0736    Specimen: Blood Updated: 10/18/21 0827     Troponin T <0.010 ng/mL     Narrative:      Troponin T Reference Range:  <= 0.03 ng/mL-   Negative for AMI  >0.03 ng/mL-     Abnormal for myocardial necrosis.  Clinicians would have to utilize clinical acumen, EKG, Troponin and serial changes to determine if it is an Acute Myocardial Infarction or myocardial injury due to an underlying chronic condition.       Results may be falsely decreased if patient taking Biotin.            Imaging Results (Last 24 Hours)     ** No results found for the last 24 hours. **      LAB RESULTS (LAST 7 DAYS)    CBC  Results from last 7 days   Lab Units 10/19/21  0541 10/18/21  0503 10/17/21  1451 10/16/21  0544 10/15/21  0610 10/14/21  1633   WBC 10*3/mm3 5.90 6.30 6.30 5.60 7.90 8.10   RBC 10*6/mm3 3.91* 4.01* 4.36 4.02* 4.24 4.20   HEMOGLOBIN g/dL 11.2* 11.4* 12.8* 11.7* 12.2* 12.1*   HEMATOCRIT % 33.3* 33.5* 37.6 34.2* 35.2* 35.1*   MCV fL 85.1 83.6 86.2 85.0 82.9 83.5   PLATELETS 10*3/mm3 319 297 350 296 310 304       BMP  Results from last 7 days   Lab Units 10/19/21  0533 10/18/21  0503 10/17/21  1451 10/16/21  0544 10/15/21  0610 10/14/21  1633   SODIUM mmol/L 131* 135* 134* 134* 138 135*   POTASSIUM mmol/L 4.9 4.8 4.3 4.1 4.3 4.6   CHLORIDE mmol/L 98 101 96* 100 100 99   CO2 mmol/L 26.0 24.0 23.0 22.0 25.0 24.0   BUN mg/dL 18 20 22 23 19 21   CREATININE mg/dL 1.10 1.01 1.01 1.07 1.09 1.07   GLUCOSE mg/dL 191* 206* 216* 164* 138* 143*   MAGNESIUM mg/dL  --  2.2  --   --  1.9  --        CMP   Results from last 7 days   Lab Units 10/19/21  0533 10/18/21  0503 10/17/21  1451 10/16/21  0544 10/15/21  0610 10/14/21  1633   SODIUM mmol/L 131* 135* 134* 134* 138 135*   POTASSIUM mmol/L 4.9 4.8 4.3 4.1 4.3 4.6   CHLORIDE mmol/L 98 101 96* 100 100 99   CO2 mmol/L 26.0 24.0 23.0 22.0 25.0 24.0   BUN mg/dL 18 20 22 23 19 21    CREATININE mg/dL 1.10 1.01 1.01 1.07 1.09 1.07   GLUCOSE mg/dL 191* 206* 216* 164* 138* 143*   ALBUMIN g/dL  --   --   --   --   --  3.70   BILIRUBIN mg/dL  --   --   --   --   --  0.6   ALK PHOS U/L  --   --   --   --   --  166*   AST (SGOT) U/L  --   --   --   --   --  14   ALT (SGPT) U/L  --   --   --   --   --  11         BNP        TROPONIN  Results from last 7 days   Lab Units 10/18/21  0736   TROPONIN T ng/mL <0.010       CoAg  Results from last 7 days   Lab Units 10/16/21  0544   INR  1.01       Creatinine Clearance  Estimated Creatinine Clearance: 80.2 mL/min (by C-G formula based on SCr of 1.1 mg/dL).    ABG        Radiology  No radiology results for the last day            EKG            I personally viewed and interpreted the patient's EKG/Telemetry data: Pacemaker rhythm.  Underlying rhythm is atrial fibrillation.    ECHOCARDIOGRAM:    Results for orders placed during the hospital encounter of 10/14/21    Adult Transesophageal Echo (EMBER) W/ Cont if Necessary Per Protocol    Interpretation Summary  ]]]]]]]]]]]]]]]]]]]]]  Date of study  10/18/2021    Procedure performed  Transesophageal echocardiogram and color Doppler study.    Indications  Mitral regurgitation    Procedure  Anesthesia was provided by anesthesiologist with intravenous Diprivan.  EMBER probe could be passed without difficulty.  Patient tolerated the procedure well.  No complications were noted.    Results  Technically satisfactory study.  Mitral valve is thickened with adequate opening motion.  Significant mitral regurgitation with central jet is present traversing to the lateral wall and superiorly and into the pulmonary vein.  Tricuspid valve is normal.  Mild tricuspid regurgitation is present.  Calculated pulmonary artery pressure is at least 40 mmHg.  Aortic valve is tricuspid and is normal.  Left atrium is enlarged.  Left atrial appendage is enlarged without clot.  Right atrium is normal in size.  Right ventricle is normal in  size.  Left ventricle is normal in size and contractility with ejection fraction of 60%.  No pericardial effusion or intracardiac thrombus is seen.  Atrial septum is intact  Aorta showed intimal thickening without any clot.    Impression  Significant mitral regurgitation central jet traversing to the lateral wall and superiorly and into the pulmonary vein.  Mild tricuspid regurgitation.  Calculated pulmonary artery pressure is 40 mmHg  Left atrial enlargement.  Left atrial appendage is normal in size without clot.  Left ventricle is normal in size and contractility with ejection fraction of 60%.  ]]]]]]]]]]]]]]]]]]          STRESS MYOVIEW:    Cardiolite (Tc-99m Sestamibi) stress test    CARDIAC CATHETERIZATION:            OTHER:         Assessment/Plan     Active Problems:    Atrial fibrillation (HCC)    CHF due to valvular disease (HCC)    Presence of coronary angioplasty implant and graft    Benign hypertension with chronic kidney disease    AV block, complete (HCC)    Angina of effort (HCC)    Chronic renal insufficiency    Ro Nathan is a 76 y.o. male who presents with history of multiple cardiac and noncardiac problems is documented in the assessment and plan was admitted to the hospital with history of chest pain for last 2 weeks.  Patient has been having chest pain almost every day and has been taking 1-2 nitroglycerin every day.  Sometimes with exertion sometimes at rest.  Patient recently was seen in the office and was scheduled to have stress Cardiolite test.  However patient has significant symptoms and patient came to the emergency room.  EKG showed no acute changes.  Patient denies having any fever cough chills.  Chest discomfort is substernal heaviness and tightness without any radiation of the discomfort into the neck or into the arms.  No associated sweating nausea vomiting or shortness of breath.         ]]]]]]]]]]]]]]]]]]]]]  Impression  ========  -Exertional and nonexertional chest  discomfort suggestive of angina pectoris.     -Status post stent placement   Status post stent to proximal LAD 12/5/2019.  Patient had stent to circumflex and RCA in the past.     Cardiac catheterization 12/5/2019 by Dr. Munson  Left ventriculography  The overall estimated left ventricular ejection fraction was 65%.  Native coronary arteriography: Left dominant circulation     1.  Left main coronary arteries a large-caliber vessel gives rise left anterior descending left circumflex flex arteries.  There is an ostial eccentric 40% lesion that appears angiographically to approach 50%. No significant coronary atherosclerotic disease present.  2.  Left anterior descending artery is a large-caliber vessel that gives rise to large caliber diagonal branches and courses along the anterior interventricular groove and wraps around the apex.  There is a proximal eccentric 50% lesion within an in-stent restenotic segment followed by an greater than 80% focal eccentric in-stent restenotic lesion followed by a 60% lesion after the stented segment and otherwise no coronary atherosclerotic disease of any significance present.  3.  The left circumflex arteries a large-caliber vessel gives rise to large caliber bifurcating obtuse marginal branch.  There is an ostial proximal concentric 50% lesion that is calcified that immediately gives rise to a very high first obtuse marginal branch that is best classified as a ramus intermedius branch supplying large territory in the anterolateral wall that has an ostial proximal 70% lesion.  The calcified continuing circumflex and the posterior AV groove gives a second obtuse marginal branch that has mild diffuse disease, a third obtuse marginal branch and then a bifurcating system that has a bifurcation stenting within it before giving rise to the left PDA.  There is diffuse 50% in-stent restenosis within this bifurcation stented segment into the obtuse marginal branch which itself has diffuse  50 to 60% disease within the stented segment.  There is a concentric 80% lesion in the circumflex PDA.  No significant coronary atherosclerotic disease present  4.  The right coronary arteries a large-caliber nondominant vessel has a stented mid segment after which there several acute marginal branches there is 50% in-stent restenosis approaching 60% at some portions within this nondominant right proximal to mid proximal segment.     Conclusions:     1.  Normal left heart filling pressures with preserved LV systolic function  2.  Normal epicardial anatomy with severe two-vessel coronary artery disease involving what is likely the culprit for the patient's chest pain syndrome, namely the very severe proximal LAD lesions.  We will treat the remaining circumflex lesions with medical therapy and see if the patient's symptoms resolved; this includes the severe lesion within the proximal portion of the high obtuse marginal branch within the circumflex system as well.     -Status post permanent ventricular pacemaker implantation ([employer] 1/13/2015)  Battery status is 4.5 years.     -History of fever and sepsis.  Positive blood cultures for coagulase-negative staph.      EMBER 7/22/2021 revealed  No evidence for valvular vegetations is present.  Significant mitral regurgitation with central jet extending to the lateral wall and into the superior aspect of the left atrium.  Mild tricuspid regurgitation.  Calculated pulmonary artery pressure is 25 mmHg.  Left atrial and left atrial appendage enlargement is present without clot.  Normal left ventricle size and contractility with ejection fraction of 60%.     -Dyslipidemia diabetes hypertension COPD CKD 3.  History of cirrhosis     -Chronic atrial fibrillation     -History of right foot ulceration with bone involvement.     -Peripheral vascular disease     -Large abdominal ventral hernia.     -Status post appendectomy appendectomy cholecystectomy left elbow surgery hernia  repair     -Allergic to morphine pantoprazole haloperidol  ===========  Plan  ========  Patient presents to the hospital with increasing symptoms of chest pain recently.  Patient is taking 1 or 2 nitroglycerin every day.  Patient also has shortness of breath.  Echocardiogram showed normal left ventricle function.  Mild mitral regurgitation.    Status post stent 12/5/2019.  Stress Cardiolite test showed inferior and apical ischemic changes.  EKG showed ventricular pacemaker rhythm.  Intrinsic rhythm is atrial fibrillation.    Patient had chest discomfort this morning.-New problem  EKG showed no acute changes.  Troponin levels are negative    Status post pacemaker.  Patient had recently interrogation of the pacemaker today revealed good pacing parameters with battery status of 4.5 years.   Patient is almost 99.7% paced in the ventricle.     Chronic atrial fibrillation.-Rate controlled     Anticoagulation was reviewed.  Patient was on Eliquis  Eliquis is on hold.    Hypertension-113/77     Dyslipidemia-continue atorvastatin     Patient to have EMBER cardiac catheterization and coronary arteriography today.  Risks and benefits pros and cons of the procedure including infection bleeding blood clot heart attack stroke allergic reaction to the dye renal dysfunction etc. were discussed with patient.     Further plan will depend on patient's progress.  ]]]]]]]]]]]]]]]]]]       Cardiac catheterization (right and left heart and EMBER (10/18/2021) reveale.)    Left ventricular size and contractility is normal with ejection fraction of 60%. 2+ mitral regurgitation is present.    Mild to moderate pulmonary hypertension    Left main coronary artery is normal.  Left anterior descending artery is a lengthy stent in the proximal segment and is patent.  Ramus intermedius has proximal 99% disease.  Circumflex coronary artery is a large and dominant vessel that has ostial 80 to 90% disease.  It provided multiple marginal branches and  PDA.  First marginal branch has mid segment and 99% disease.  PDA is a large vessel that has proximal lengthy 90% disease.  Right coronary artery is a nondominant vessel that has 95% disease in the proximal segment within the stent.    EMBER 10/18/2021 reviewed  Significant mitral regurgitation central jet traversing to the lateral wall and superiorly and into the pulmonary vein.  Mild tricuspid regurgitation.  Calculated pulmonary artery pressure is 40 mmHg  Left atrial enlargement.  Left atrial appendage is normal in size without clot.  Left ventricle is normal in size and contractility with ejection fraction of 60%.    RECOMMENDATIONS:  Patient would benefit from CABG and mitral valve repair versus replacement.  Cardiovascular surgery consultation initiated.  ]]]]]]]]]]]]]]]]]]]]]    Have discussed with Dr. Antoine regarding CABG and mitral valve surgery.  Patient was thought to be high risk with significant comorbidities especially risk for infection.  Essentially patient was started on for surgical intervention.  Have discussed with interventionist Dr. Norwood regarding interventional options.  Meanwhile maximize medical treatment.  Start metoprolol and Imdur.  If blood pressure is a concern losartan dose can be reduced or discontinued.  Further plan will depend on patient's progress.      Celine Swanson MD  10/19/21  06:42 EDT

## 2021-10-19 NOTE — PROGRESS NOTES
76 y.o. male with multiple comorbidities and history of multiple infections who presented to the ED on 10/14/21 with a chief complaint of chest pain. Cardiac cath reveals severe multivessel CAD. EMBER shows significant mitral regurgitation.    Per Dr. Antoine, patient is not a surgical candidate due to multiple comorbidities and risk for infection. Dr. Antoine discussed his recommendations with Dr. Swanson who is discussing situation with  for possible interventional options. Maximize medical treatment.

## 2021-10-19 NOTE — PROGRESS NOTES
"NEPHROLOGY PROGRESS NOTE------[facility]/OPT    [facility]  [phone]  Leonel Camilo MD      Patient Care Team:  Yusuf Jones MD as PCP - Joshua Evangelista MD as Consulting Physician (Cardiology)  Moise Watson MD as Consulting Physician (Cardiac Electrophysiology)  Izzy Alvarez MD as Consulting Physician (Nephrology)  Celine Swanson MD as Consulting Physician (Cardiology)      Provider:  Leonel Camilo MD  Patient Name: Ro Nathan  :  1945    SUBJECTIVE:  F/u CKD  No chest pain or SOA  S/p heart cath      Medication:  [MAR Hold] Acetylcysteine, 600 mg, Oral, BID  aspirin, 81 mg, Oral, Daily  atorvastatin, 40 mg, Oral, QAM  budesonide, 0.5 mg, Nebulization, BID - RT  clopidogrel, 75 mg, Oral, QAM  furosemide, 20 mg, Oral, QAM  isosorbide mononitrate, 60 mg, Oral, Q24H  losartan, 50 mg, Oral, QAM  metoprolol succinate XL, 25 mg, Oral, Q24H  pregabalin, 150 mg, Oral, BID  rOPINIRole, 0.5 mg, Oral, Nightly  sodium chloride, 3 mL, Intravenous, Q12H  traZODone, 100 mg, Oral, Nightly           OBJECTIVE    Vital Sign Min/Max for last 24 hours  Temp  Min: 98.1 °F (36.7 °C)  Max: 98.1 °F (36.7 °C)   BP  Min: 102/74  Max: 180/164   Pulse  Min: 70  Max: 94   Resp  Min: 16  Max: 21   SpO2  Min: 91 %  Max: 100 %   No data recorded   Weight  Min: 126 kg (278 lb)  Max: 126 kg (278 lb)     Flowsheet Rows      First Filed Value   Admission Height 182.9 cm (72\") Documented at 10/14/2021 153   Admission Weight 131 kg (288 lb 12.8 oz) Documented at 10/14/2021 1532          I/O this shift:  In: 600 [P.O.:600]  Out: -   I/O last 3 completed shifts:  In: 480 [P.O.:480]  Out: -     Physical Exam:  General Appearance: alert, appears stated age and cooperative  Head: normocephalic, without obvious abnormality and atraumatic  Eyes: conjunctivae and sclerae normal and no icterus  Neck: supple and no JVD  Lungs: clear to " auscultation and respirations regular  Heart: regular rhythm & normal rate and normal S1, S2  Chest: Wall no abnormalities observed  Abdomen: normal bowel sounds and soft non-tender  Extremities: moves extremities well, no edema, no cyanosis and no redness  Skin: no bleeding, bruising or rash, turgor normal, color normal and no lesions noted  Neurologic: Alert, and oriented. No focal deficits    Labs:    WBC WBC   Date Value Ref Range Status   10/19/2021 5.90 3.40 - 10.80 10*3/mm3 Final   10/18/2021 6.30 3.40 - 10.80 10*3/mm3 Final   10/17/2021 6.30 3.40 - 10.80 10*3/mm3 Final      HGB Hemoglobin   Date Value Ref Range Status   10/19/2021 11.2 (L) 13.0 - 17.7 g/dL Final   10/18/2021 11.4 (L) 13.0 - 17.7 g/dL Final   10/17/2021 12.8 (L) 13.0 - 17.7 g/dL Final      HCT Hematocrit   Date Value Ref Range Status   10/19/2021 33.3 (L) 37.5 - 51.0 % Final   10/18/2021 33.5 (L) 37.5 - 51.0 % Final   10/17/2021 37.6 37.5 - 51.0 % Final      Platlets No results found for: LABPLAT   MCV MCV   Date Value Ref Range Status   10/19/2021 85.1 79.0 - 97.0 fL Final   10/18/2021 83.6 79.0 - 97.0 fL Final   10/17/2021 86.2 79.0 - 97.0 fL Final          Sodium Sodium   Date Value Ref Range Status   10/19/2021 131 (L) 136 - 145 mmol/L Final   10/18/2021 135 (L) 136 - 145 mmol/L Final   10/17/2021 134 (L) 136 - 145 mmol/L Final      Potassium Potassium   Date Value Ref Range Status   10/19/2021 4.9 3.5 - 5.2 mmol/L Final     Comment:     Slight hemolysis detected by analyzer. Results may be affected.   10/18/2021 4.8 3.5 - 5.2 mmol/L Final   10/17/2021 4.3 3.5 - 5.2 mmol/L Final      Chloride Chloride   Date Value Ref Range Status   10/19/2021 98 98 - 107 mmol/L Final   10/18/2021 101 98 - 107 mmol/L Final   10/17/2021 96 (L) 98 - 107 mmol/L Final      CO2 CO2   Date Value Ref Range Status   10/19/2021 26.0 22.0 - 29.0 mmol/L Final   10/18/2021 24.0 22.0 - 29.0 mmol/L Final   10/17/2021 23.0 22.0 - 29.0 mmol/L Final      BUN BUN   Date  Value Ref Range Status   10/19/2021 18 8 - 23 mg/dL Final   10/18/2021 20 8 - 23 mg/dL Final   10/17/2021 22 8 - 23 mg/dL Final      Creatinine Creatinine   Date Value Ref Range Status   10/19/2021 1.10 0.76 - 1.27 mg/dL Final   10/18/2021 1.01 0.76 - 1.27 mg/dL Final   10/17/2021 1.01 0.76 - 1.27 mg/dL Final      Calcium Calcium   Date Value Ref Range Status   10/19/2021 8.3 (L) 8.6 - 10.5 mg/dL Final   10/18/2021 8.6 8.6 - 10.5 mg/dL Final   10/17/2021 8.6 8.6 - 10.5 mg/dL Final      PO4 No components found for: PO4   Albumin No results found for: ALBUMIN   Magnesium Magnesium   Date Value Ref Range Status   10/18/2021 2.2 1.6 - 2.4 mg/dL Final      Uric Acid No components found for: URIC ACID     Imaging Results (Last 72 Hours)     ** No results found for the last 72 hours. **          Results for orders placed during the hospital encounter of 10/14/21    XR Chest 2 View    Narrative  DATE OF EXAM:  10/14/2021 4:29 PM    PROCEDURE:  XR CHEST 2 VW-    INDICATIONS:  Chest pain.    COMPARISON:  7/23/2021.    TECHNIQUE:  Two radiologic views of the chest.    FINDINGS:  The heart is borderline enlarged. The pulmonary vascular markings are  normal. The lungs and pleural spaces are clear of active disease. There  is a left-sided transvenous pacemaker in place.  There is degenerative  spondylosis of the thoracic spine.    Impression  1. Borderline cardiomegaly.  2. No active pulmonary disease.    Electronically Signed By-Yusuf Gray MD On:10/14/2021 4:33 PM  This report was finalized on [id number] by  Yusuf Gray MD.      Results for orders placed during the hospital encounter of 07/16/21    XR Chest 1 View    Narrative  Examination: XR CHEST 1 VW-    Date of Exam: 7/23/2021 8:25 PM    Indication: picc tip placement; A41.9-Sepsis, unspecified organism;  R41.0-Disorientation, unspecified.    Comparison: 07/16/2021    Technique: 1 view of the chest    FINDINGS:  There is a right-sided PICC line with its tip in the  superior vena cava.  There is a bipolar transvenous pacer unchanged. Heart size appears  mildly enlarged. There is no vascular congestion, airspace opacity, or  pleural effusion noted. Mediastinal contours are stable. No bone  abnormality.    Impression  1. PICC line tip is in the superior vena cava.  2. Mild cardiomegaly. No evidence of active lung disease.    Electronically Signed By-Judith Marie MD On:7/23/2021 9:30 PM  This report was finalized on [id number] by  Judith Marie MD.      XR Foot 3+ View Right    Narrative  DATE OF EXAM:  7/17/2021 4:10 PM    PROCEDURE:  XR FOOT 3+ VW RIGHT-    INDICATIONS:  Right foot wound at the lateral aspect near the fifth metatarsal.  History of diabetes and right foot surgery due to prior infection.    COMPARISON:  04/05/2021    TECHNIQUE:  A minimum of three routine standard radiographic views were obtained of  the right foot.    FINDINGS:  There has been amputation of the fifth metatarsal at the level of the  mid metatarsal. There appears to have been partial amputation of the  remaining distal fifth metatarsal since the prior study.    There appears to be a soft tissue wound at the lateral aspect of the  foot in the area of the resected distal fifth metatarsal. No definite  acute osseous abnormality is seen at the remaining fifth digit compared  to the prior study.  There is soft tissue edema. No significant soft  tissue gas is seen. There are hammertoe deformities. Degenerative  changes are present in the mid and forefoot, as before. No acute  malalignment or displaced fracture is seen. There is enthesophyte  formation at the calcaneus.    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  2.Lateral soft tissue wound with soft tissue edema which may indicate  skin and soft tissue infection. No significant soft tissue gas is seen.  3.No definite radiographic evidence of acute osseous abnormality.    Electronically Signed By-Joshua  MD Citlaly On:7/17/2021 5:14 PM  This report was finalized on [id number] by  Joshua Boucher MD.      Results for orders placed during the hospital encounter of 10/02/20    Doppler Ankle Brachial Index Single Level CAR    Interpretation Summary  · Right Conclusion: The right RASHARD is normal. Normal digital pressures.  · Left Conclusion: The left RASHARD is normal. Normal digital pressures.        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 Baseline creatinine 1.05-1.2  · Hypertension  · Type 2 diabetes  · Fluid overload  · Chest pain  · History congestive heart failure     CR been stable, no labs this am  Last K 4.9--stop KCl  Check labs  S/p cath--CTS see  Monitor renal function fluid status electrolytes        Leonel Camilo MD  [facility]  [phone]  10/19/21  14:53 EDT

## 2021-10-20 LAB
ANION GAP SERPL CALCULATED.3IONS-SCNC: 9 MMOL/L (ref 5–15)
BUN SERPL-MCNC: 23 MG/DL (ref 8–23)
BUN/CREAT SERPL: 19.3 (ref 7–25)
CALCIUM SPEC-SCNC: 9.2 MG/DL (ref 8.6–10.5)
CHLORIDE SERPL-SCNC: 98 MMOL/L (ref 98–107)
CO2 SERPL-SCNC: 26 MMOL/L (ref 22–29)
CREAT SERPL-MCNC: 1.19 MG/DL (ref 0.76–1.27)
GFR SERPL CREATININE-BSD FRML MDRD: 60 ML/MIN/1.73
GLUCOSE SERPL-MCNC: 174 MG/DL (ref 65–99)
POTASSIUM SERPL-SCNC: 4.8 MMOL/L (ref 3.5–5.2)
POTASSIUM SERPL-SCNC: 4.9 MMOL/L (ref 3.5–5.2)
QT INTERVAL: 452 MS
QT INTERVAL: 471 MS
QT INTERVAL: 472 MS
SODIUM SERPL-SCNC: 133 MMOL/L (ref 136–145)

## 2021-10-20 PROCEDURE — 80048 BASIC METABOLIC PNL TOTAL CA: CPT | Performed by: FAMILY MEDICINE

## 2021-10-20 PROCEDURE — 84132 ASSAY OF SERUM POTASSIUM: CPT | Performed by: FAMILY MEDICINE

## 2021-10-20 PROCEDURE — 94799 UNLISTED PULMONARY SVC/PX: CPT

## 2021-10-20 PROCEDURE — 25010000002 HYDROMORPHONE PER 4 MG: Performed by: FAMILY MEDICINE

## 2021-10-20 PROCEDURE — 99233 SBSQ HOSP IP/OBS HIGH 50: CPT | Performed by: INTERNAL MEDICINE

## 2021-10-20 RX ORDER — SODIUM CHLORIDE 9 MG/ML
30 INJECTION, SOLUTION INTRAVENOUS CONTINUOUS
Status: DISCONTINUED | OUTPATIENT
Start: 2021-10-20 | End: 2021-10-21 | Stop reason: HOSPADM

## 2021-10-20 RX ADMIN — ATORVASTATIN CALCIUM 40 MG: 40 TABLET, FILM COATED ORAL at 07:43

## 2021-10-20 RX ADMIN — ASPIRIN 81 MG: 81 TABLET, COATED ORAL at 07:42

## 2021-10-20 RX ADMIN — OXYCODONE 10 MG: 5 TABLET ORAL at 22:39

## 2021-10-20 RX ADMIN — ISOSORBIDE MONONITRATE 60 MG: 60 TABLET, EXTENDED RELEASE ORAL at 07:43

## 2021-10-20 RX ADMIN — LOSARTAN POTASSIUM 50 MG: 50 TABLET, FILM COATED ORAL at 07:42

## 2021-10-20 RX ADMIN — PREGABALIN 150 MG: 75 CAPSULE ORAL at 22:35

## 2021-10-20 RX ADMIN — Medication 3 ML: at 22:35

## 2021-10-20 RX ADMIN — HYDROMORPHONE HYDROCHLORIDE 0.25 MG: 2 INJECTION INTRAMUSCULAR; INTRAVENOUS; SUBCUTANEOUS at 18:00

## 2021-10-20 RX ADMIN — HYDROMORPHONE HYDROCHLORIDE 0.25 MG: 2 INJECTION INTRAMUSCULAR; INTRAVENOUS; SUBCUTANEOUS at 05:39

## 2021-10-20 RX ADMIN — BUDESONIDE 0.5 MG: 0.5 SUSPENSION RESPIRATORY (INHALATION) at 20:45

## 2021-10-20 RX ADMIN — ROPINIROLE HYDROCHLORIDE 0.5 MG: 0.25 TABLET, FILM COATED ORAL at 22:35

## 2021-10-20 RX ADMIN — METOPROLOL SUCCINATE 25 MG: 25 TABLET, EXTENDED RELEASE ORAL at 07:43

## 2021-10-20 RX ADMIN — HYDROMORPHONE HYDROCHLORIDE 0.25 MG: 2 INJECTION INTRAMUSCULAR; INTRAVENOUS; SUBCUTANEOUS at 09:53

## 2021-10-20 RX ADMIN — IPRATROPIUM BROMIDE AND ALBUTEROL SULFATE 3 ML: 2.5; .5 SOLUTION RESPIRATORY (INHALATION) at 09:16

## 2021-10-20 RX ADMIN — FUROSEMIDE 20 MG: 20 TABLET ORAL at 07:42

## 2021-10-20 RX ADMIN — HYDROMORPHONE HYDROCHLORIDE 0.25 MG: 2 INJECTION INTRAMUSCULAR; INTRAVENOUS; SUBCUTANEOUS at 22:34

## 2021-10-20 RX ADMIN — CLOPIDOGREL BISULFATE 75 MG: 75 TABLET ORAL at 07:42

## 2021-10-20 RX ADMIN — Medication 3 ML: at 09:54

## 2021-10-20 RX ADMIN — TRAZODONE HYDROCHLORIDE 100 MG: 100 TABLET ORAL at 22:35

## 2021-10-20 RX ADMIN — HYDROMORPHONE HYDROCHLORIDE 0.25 MG: 2 INJECTION INTRAMUSCULAR; INTRAVENOUS; SUBCUTANEOUS at 14:05

## 2021-10-20 RX ADMIN — PREGABALIN 150 MG: 75 CAPSULE ORAL at 07:42

## 2021-10-20 RX ADMIN — BUDESONIDE 0.5 MG: 0.5 SUSPENSION RESPIRATORY (INHALATION) at 09:16

## 2021-10-20 NOTE — PROGRESS NOTES
Referring Provider: Yusuf Jones MD    Reason for follow-up:  Unstable angina  Status post stent  Chronic atrial fibrillation  Status post pacemaker  Severe coronary artery disease       Patient Care Team:  Yusuf Jones MD as PCP - General  Joshua Yip MD as Consulting Physician (Cardiology)  Moise Watson MD as Consulting Physician (Cardiac Electrophysiology)  Izzy Alvarez MD as Consulting Physician (Nephrology)  Cleine Swanson MD as Consulting Physician (Cardiology)    Subjective .  Feeling okay.  ROS    Patient is not having any chest discomfort, shortness of breath, palpitations, dizziness or syncope.  Denies having any headache ,abdominal pain ,nausea, vomiting , diarrhea constipation, loss of weight or loss of appetite.  Denies having any excessive bruising ,hematuria or blood in the stool.    Review of all systems negative except as indicated    History  Past Medical History:   Diagnosis Date   • Alcoholic cirrhosis of liver with ascites ([facility]) 10/26/2020   • Benign hypertension with CKD (chronic kidney disease) stage III ([facility]) 10/26/2020   • Bruises easily    • CHF (congestive heart failure) ([facility])    • Chronic bronchitis with COPD (chronic obstructive pulmonary disease) ([facility]) 10/26/2020   • Chronic respiratory failure with hypoxia ([facility]) 10/26/2020   • Congenital heart defect    • Difficulty attaining erection    • Hypertension    • Low back pain    • Pacemaker    • Peripheral vascular disease due to secondary diabetes ([facility]) 10/26/2020   • Poor circulation    • Prostate cancer ([facility])     prostate   • Shortness of breath    • Sleep apnea     using CPAP    • Stage 3a chronic kidney disease ([facility]) 10/26/2020   • Stented coronary artery 12/05/2019    MICHAEL to LAD   • Type 2 diabetes, controlled, with neuropathy ([facility]) 10/26/2020    no meds   • Type 2 diabetes, controlled, with neuropathy ([facility]) 10/26/2020       Past Surgical History:   Procedure Laterality Date   • ABDOMINAL  SURGERY     • APPENDECTOMY     • BONE CURETTAGE Right 1/22/2021    Procedure: Wound excision with fifth metatarsal head resection, right foot.;  Surgeon: Josef Egan DPM;  Location: [facility] MAIN OR;  Service: Podiatry;  Laterality: Right;   • BONE INCISION AND DRAINAGE Right 10/5/2020    Procedure: Incision and drainage with debridement of all nonviable soft tissue and bone with bone biopsy, right foot.;  Surgeon: Josef Egan DPM;  Location: [facility] MAIN OR;  Service: Podiatry;  Laterality: Right;   • CARDIAC CATHETERIZATION N/A 12/5/2019    Procedure: Left Heart Cath;  Surgeon: Mirza Munson MD;  Location: [facility] CATH INVASIVE LOCATION;  Service: Cardiology   • CARDIAC CATHETERIZATION N/A 12/5/2019    Procedure: Stent MICHAEL coronary;  Surgeon: Mirza Munson MD;  Location: [facility] CATH INVASIVE LOCATION;  Service: Cardiology   • CARDIAC CATHETERIZATION N/A 10/18/2021    Procedure: Left Heart Cath and coronary angiogram;  Surgeon: Celine Swanson MD;  Location: [facility] CATH INVASIVE LOCATION;  Service: Cardiovascular;  Laterality: N/A;   • CARDIAC CATHETERIZATION N/A 10/18/2021    Procedure: Right Heart Cath;  Surgeon: Celine Swanson MD;  Location: [facility] CATH INVASIVE LOCATION;  Service: Cardiovascular;  Laterality: N/A;   • CHOLECYSTECTOMY     • ELBOW PROCEDURE      left   • FOOT SURGERY      right foot   • HERNIA REPAIR     • INCISION AND DRAINAGE OF WOUND Right 4/6/2021    Procedure: INCISION AND DRAINAGE OF RIGHT FOOT 5TH METATARSAL TO BONE AND PARTIAL 5TH METATARSAL RESECTION;  Surgeon: Josef Egan DPM;  Location: [facility] MAIN OR;  Service: Podiatry;  Laterality: Right;   • INCISION AND DRAINAGE OF WOUND Right 4/8/2021    Procedure: INCISION AND DRAINAGE BONE, DELAYED PRIMARY CLOSURE;  Surgeon: Josef Egan DPM;  Location: [facility] MAIN OR;  Service: Podiatry;  Laterality: Right;   • INTERVENTIONAL RADIOLOGY PROCEDURE N/A 12/5/2019    Procedure: Intravascular  Ultrasound;  Surgeon: Mirza Munson MD;  Location: [facility] CATH INVASIVE LOCATION;  Service: Cardiology   • PACEMAKER IMPLANTATION     • MT RT/LT HEART CATHETERS N/A 2019    Procedure: Percutaneous Coronary Intervention;  Surgeon: Mirza Munson MD;  Location: [facility] CATH INVASIVE LOCATION;  Service: Cardiology   • WOUND DEBRIDEMENT Right 10/29/2020    Procedure: Preparation and debridement of foot wound with application of Integra wound graft, right foot.;  Surgeon: Josef Egan DPM;  Location: [facility] MAIN OR;  Service: Podiatry;  Laterality: Right;   • WOUND DEBRIDEMENT N/A 2021    Procedure: EXCISIONAL ABDOMINAL WOUND  DEBRIDEMENT;  Surgeon: Cleopatra Wang MD;  Location: [facility] MAIN OR;  Service: General;  Laterality: N/A;   • WOUND DEBRIDEMENT N/A 2021    Procedure: DEBRIDEMENT OF ABDOMINAL WALL;  Surgeon: Hill Bhatia DO;  Location: [facility] MAIN OR;  Service: General;  Laterality: N/A;       Family History   Problem Relation Age of Onset   • Alzheimer's disease Mother    • GI problems Father    • Heart disease Father        Social History     Tobacco Use   • Smoking status: Former Smoker     Years: 15.00     Types: Cigarettes     Start date: 1966     Quit date: 5/10/2020     Years since quittin.4   • Smokeless tobacco: Never Used   Vaping Use   • Vaping Use: Never used   Substance Use Topics   • Alcohol use: Yes     Alcohol/week: 4.0 standard drinks     Types: 4 Shots of liquor per week     Comment: maybe 4 shots per month   • Drug use: No        Medications Prior to Admission   Medication Sig Dispense Refill Last Dose   • apixaban (ELIQUIS) 5 MG tablet tablet Take 5 mg by mouth 2 (Two) Times a Day.   10/14/2021 at Unknown time   • aspirin 81 MG EC tablet Take 1 tablet by mouth Daily. 30 tablet 5 10/14/2021 at Unknown time   • atorvastatin (LIPITOR) 40 MG tablet Take 40 mg by mouth Every Morning.   10/14/2021 at Unknown time   • budesonide (PULMICORT) 0.5  MG/2ML nebulizer solution Take 0.5 mg by nebulization 2 (Two) Times a Day As Needed.   10/14/2021 at Unknown time   • clopidogrel (PLAVIX) 75 MG tablet Take 75 mg by mouth Every Morning.   10/14/2021 at Unknown time   • furosemide (LASIX) 20 MG tablet Take 20 mg by mouth Every Morning.   10/14/2021 at Unknown time   • ipratropium-albuterol (DUO-NEB) 0.5-2.5 mg/3 ml nebulizer 3 mL 4 (Four) Times a Day As Needed.   10/14/2021 at Unknown time   • losartan (COZAAR) 50 MG tablet Take 50 mg by mouth Every Morning.   10/14/2021 at Unknown time   • metoprolol tartrate (LOPRESSOR) 25 MG tablet Take 25 mg by mouth 2 (Two) Times a Day.   10/14/2021 at Unknown time   • nitroglycerin (NITROSTAT) 0.4 MG SL tablet Place 0.4 mg under the tongue Every 5 (Five) Minutes As Needed for Chest Pain.   10/14/2021 at Unknown time   • Perforomist 20 MCG/2ML nebulizer solution 20 mcg 2 (Two) Times a Day As Needed.   10/14/2021 at Unknown time   • potassium chloride (K-DUR) 10 MEQ CR tablet Take 10 mEq by mouth 3 (Three) Times a Day With Meals.   10/14/2021 at Unknown time   • pregabalin (LYRICA) 150 MG capsule TAKE ONE CAPSULE BY MOUTH TWICE A DAY 60 capsule 2 10/14/2021 at Unknown time   • rOPINIRole (REQUIP) 0.5 MG tablet Take 0.5 mg by mouth Every Night.   10/13/2021 at Unknown time   • VENTOLIN  (90 Base) MCG/ACT inhaler Inhale 2 puffs Every 6 (Six) Hours As Needed for Wheezing or Shortness of Air.   Past Month at Unknown time   • miconazole (MICOTIN) 2 % cream Apply 1 application topically to the appropriate area as directed Every 12 (Twelve) Hours. 60 g 1 Unknown at Unknown time   • mirtazapine (REMERON) 30 MG tablet Take 30 mg by mouth Every Night.   Unknown at Unknown time   • oxyCODONE-acetaminophen (Percocet)  MG per tablet Take 1 tablet by mouth Every 6 (Six) Hours As Needed for Moderate Pain . 120 tablet 0        Allergies  Haloperidol, Morphine, Pantoprazole, and Latex    Scheduled Meds:[MAR Hold] Acetylcysteine, 600  "mg, Oral, BID  aspirin, 81 mg, Oral, Daily  atorvastatin, 40 mg, Oral, QAM  budesonide, 0.5 mg, Nebulization, BID - RT  clopidogrel, 75 mg, Oral, QAM  furosemide, 20 mg, Oral, QAM  isosorbide mononitrate, 60 mg, Oral, Q24H  losartan, 50 mg, Oral, QAM  metoprolol succinate XL, 25 mg, Oral, Q24H  pregabalin, 150 mg, Oral, BID  rOPINIRole, 0.5 mg, Oral, Nightly  sodium chloride, 3 mL, Intravenous, Q12H  traZODone, 100 mg, Oral, Nightly      Continuous Infusions:   PRN Meds:.•  acetaminophen  •  acetaminophen  •  atropine  •  benzonatate  •  diphenhydrAMINE  •  HYDROmorphone  •  influenza vaccine  •  ipratropium-albuterol  •  nitroglycerin  •  ondansetron  •  ondansetron **OR** ondansetron  •  oxyCODONE  •  [COMPLETED] Insert peripheral IV **AND** sodium chloride  •  sodium chloride  •  sodium chloride    Objective     VITAL SIGNS  Vitals:    10/20/21 0000 10/20/21 0100 10/20/21 0200 10/20/21 0300   BP: (!) 75/41 112/50 110/51 103/61   Pulse: 70 70 70 70   Resp:       Temp:       TempSrc:       SpO2:       Weight:       Height:           Flowsheet Rows      First Filed Value   Admission Height 182.9 cm (72\") Documented at 10/14/2021 1532   Admission Weight 131 kg (288 lb 12.8 oz) Documented at 10/14/2021 1532            Intake/Output Summary (Last 24 hours) at 10/20/2021 0637  Last data filed at 10/19/2021 1600  Gross per 24 hour   Intake 1200 ml   Output 300 ml   Net 900 ml        TELEMETRY: Pacemaker rhythm    Physical Exam:  The patient is alert, oriented and in no distress.  Vital signs as noted above.  Exogenous obesity.  Head and neck revealed no carotid bruits or jugular venous distention.  No thyromegaly or lymphadenopathy is present  Lungs clear.  No wheezing.  Breath sounds are normal bilaterally.  Heart normal first and second heart sounds.  No murmur. No precordial rub is present.  No gallop is present.  Abdomen soft and nontender.  No organomegaly is present.  Significant ventral hernia.  Extremities with " good peripheral pulses without any pedal edema.  Skin warm and dry.  Musculoskeletal system is grossly normal  CNS grossly normal      Results Review:   I reviewed the patient's new clinical results.  Lab Results (last 24 hours)     Procedure Component Value Units Date/Time    POC Glucose Once [[id number]]  (Abnormal) Collected: 10/19/21 1621    Specimen: Blood Updated: 10/19/21 1623     Glucose 250 mg/dL      Comment: Serial Number: [id number]:  [id number]       Basic Metabolic Panel [[id number]]  (Abnormal) Collected: 10/19/21 1509    Specimen: Blood Updated: 10/19/21 1538     Glucose 236 mg/dL      BUN 22 mg/dL      Creatinine 1.33 mg/dL      Sodium 132 mmol/L      Potassium 5.4 mmol/L      Comment: Slight hemolysis detected by analyzer. Results may be affected.        Chloride 98 mmol/L      CO2 23.0 mmol/L      Calcium 8.6 mg/dL      eGFR Non African Amer 52 mL/min/1.73      BUN/Creatinine Ratio 16.5     Anion Gap 11.0 mmol/L     Narrative:      GFR Normal >60  Chronic Kidney Disease <60  Kidney Failure <15      POC Glucose Once [[id number]]  (Abnormal) Collected: 10/19/21 1141    Specimen: Blood Updated: 10/19/21 1142     Glucose 217 mg/dL      Comment: Serial Number: [id number]:  [id number]             Imaging Results (Last 24 Hours)     ** No results found for the last 24 hours. **      LAB RESULTS (LAST 7 DAYS)    CBC  Results from last 7 days   Lab Units 10/19/21  0541 10/18/21  0503 10/17/21  1451 10/16/21  0544 10/15/21  0610 10/14/21  1633   WBC 10*3/mm3 5.90 6.30 6.30 5.60 7.90 8.10   RBC 10*6/mm3 3.91* 4.01* 4.36 4.02* 4.24 4.20   HEMOGLOBIN g/dL 11.2* 11.4* 12.8* 11.7* 12.2* 12.1*   HEMATOCRIT % 33.3* 33.5* 37.6 34.2* 35.2* 35.1*   MCV fL 85.1 83.6 86.2 85.0 82.9 83.5   PLATELETS 10*3/mm3 319 297 350 296 310 304       BMP  Results from last 7 days   Lab Units 10/19/21  1509 10/19/21  0533 10/18/21  0503 10/17/21  1451 10/16/21  0544 10/15/21  0610 10/14/21  1633   SODIUM mmol/L 132* 131*  135* 134* 134* 138 135*   POTASSIUM mmol/L 5.4* 4.9 4.8 4.3 4.1 4.3 4.6   CHLORIDE mmol/L 98 98 101 96* 100 100 99   CO2 mmol/L 23.0 26.0 24.0 23.0 22.0 25.0 24.0   BUN mg/dL 22 18 20 22 23 19 21   CREATININE mg/dL 1.33* 1.10 1.01 1.01 1.07 1.09 1.07   GLUCOSE mg/dL 236* 191* 206* 216* 164* 138* 143*   MAGNESIUM mg/dL  --   --  2.2  --   --  1.9  --        CMP   Results from last 7 days   Lab Units 10/19/21  1509 10/19/21  0533 10/18/21  0503 10/17/21  1451 10/16/21  0544 10/15/21  0610 10/14/21  1633   SODIUM mmol/L 132* 131* 135* 134* 134* 138 135*   POTASSIUM mmol/L 5.4* 4.9 4.8 4.3 4.1 4.3 4.6   CHLORIDE mmol/L 98 98 101 96* 100 100 99   CO2 mmol/L 23.0 26.0 24.0 23.0 22.0 25.0 24.0   BUN mg/dL 22 18 20 22 23 19 21   CREATININE mg/dL 1.33* 1.10 1.01 1.01 1.07 1.09 1.07   GLUCOSE mg/dL 236* 191* 206* 216* 164* 138* 143*   ALBUMIN g/dL  --   --   --   --   --   --  3.70   BILIRUBIN mg/dL  --   --   --   --   --   --  0.6   ALK PHOS U/L  --   --   --   --   --   --  166*   AST (SGOT) U/L  --   --   --   --   --   --  14   ALT (SGPT) U/L  --   --   --   --   --   --  11         BNP        TROPONIN  Results from last 7 days   Lab Units 10/18/21  0736   TROPONIN T ng/mL <0.010       CoAg  Results from last 7 days   Lab Units 10/16/21  0544   INR  1.01       Creatinine Clearance  Estimated Creatinine Clearance: 66.3 mL/min (A) (by C-G formula based on SCr of 1.33 mg/dL (H)).    ABG        Radiology  No radiology results for the last day            EKG            I personally viewed and interpreted the patient's EKG/Telemetry data: Pacemaker rhythm.  Underlying rhythm is atrial fibrillation.    ECHOCARDIOGRAM:    Results for orders placed during the hospital encounter of 10/14/21    Adult Transesophageal Echo (EMBER) W/ Cont if Necessary Per Protocol    Interpretation Summary  ]]]]]]]]]]]]]]]]]]]]]  Date of study  10/18/2021    Procedure performed  Transesophageal echocardiogram and color Doppler  study.    Indications  Mitral regurgitation    Procedure  Anesthesia was provided by anesthesiologist with intravenous Diprivan.  EMBER probe could be passed without difficulty.  Patient tolerated the procedure well.  No complications were noted.    Results  Technically satisfactory study.  Mitral valve is thickened with adequate opening motion.  Significant mitral regurgitation with central jet is present traversing to the lateral wall and superiorly and into the pulmonary vein.  Tricuspid valve is normal.  Mild tricuspid regurgitation is present.  Calculated pulmonary artery pressure is at least 40 mmHg.  Aortic valve is tricuspid and is normal.  Left atrium is enlarged.  Left atrial appendage is enlarged without clot.  Right atrium is normal in size.  Right ventricle is normal in size.  Left ventricle is normal in size and contractility with ejection fraction of 60%.  No pericardial effusion or intracardiac thrombus is seen.  Atrial septum is intact  Aorta showed intimal thickening without any clot.    Impression  Significant mitral regurgitation central jet traversing to the lateral wall and superiorly and into the pulmonary vein.  Mild tricuspid regurgitation.  Calculated pulmonary artery pressure is 40 mmHg  Left atrial enlargement.  Left atrial appendage is normal in size without clot.  Left ventricle is normal in size and contractility with ejection fraction of 60%.  ]]]]]]]]]]]]]]]]]]          STRESS MYOVIEW:    Cardiolite (Tc-99m Sestamibi) stress test    CARDIAC CATHETERIZATION:            OTHER:         Assessment/Plan     Active Problems:    Atrial fibrillation (HCC)    CHF due to valvular disease (HCC)    Presence of coronary angioplasty implant and graft    Benign hypertension with chronic kidney disease    AV block, complete (HCC)    Angina of effort (HCC)    Chronic renal insufficiency    Ro Nathan is a 76 y.o. male who presents with history of multiple cardiac and noncardiac problems is  documented in the assessment and plan was admitted to the hospital with history of chest pain for last 2 weeks.  Patient has been having chest pain almost every day and has been taking 1-2 nitroglycerin every day.  Sometimes with exertion sometimes at rest.  Patient recently was seen in the office and was scheduled to have stress Cardiolite test.  However patient has significant symptoms and patient came to the emergency room.  EKG showed no acute changes.  Patient denies having any fever cough chills.  Chest discomfort is substernal heaviness and tightness without any radiation of the discomfort into the neck or into the arms.  No associated sweating nausea vomiting or shortness of breath.         ]]]]]]]]]]]]]]]]]]]]]  Impression  ========  -Exertional and nonexertional chest discomfort suggestive of angina pectoris.     -Status post stent placement   Status post stent to proximal LAD 12/5/2019.  Patient had stent to circumflex and RCA in the past.    Cardiac catheterization (right and left heart and EMBER (10/18/2021) reveale.)  Left ventricular size and contractility is normal with ejection fraction of 60%. 2+ mitral regurgitation is present.  Mild to moderate pulmonary hypertension  Left main coronary artery is normal.  Left anterior descending artery is a lengthy stent in the proximal segment and is patent.  Ramus intermedius has proximal 99% disease.  Circumflex coronary artery is a large and dominant vessel that has ostial 80 to 90% disease.  It provided multiple marginal branches and PDA.  First marginal branch has mid segment and 99% disease.  PDA is a large vessel that has proximal lengthy 90% disease.  Right coronary artery is a nondominant vessel that has 95% disease in the proximal segment within the stent.    EMBER 10/18/2021 reviewed  Significant mitral regurgitation central jet traversing to the lateral wall and superiorly and into the pulmonary vein.  Mild tricuspid regurgitation.  Calculated pulmonary  artery pressure is 40 mmHg  Left atrial enlargement.  Left atrial appendage is normal in size without clot.  Left ventricle is normal in size and contractility with ejection fraction of 60%.     Cardiac catheterization 12/5/2019 by Dr. Munson  Left ventriculography  The overall estimated left ventricular ejection fraction was 65%.  Native coronary arteriography: Left dominant circulation     1.  Left main coronary arteries a large-caliber vessel gives rise left anterior descending left circumflex flex arteries.  There is an ostial eccentric 40% lesion that appears angiographically to approach 50%. No significant coronary atherosclerotic disease present.  2.  Left anterior descending artery is a large-caliber vessel that gives rise to large caliber diagonal branches and courses along the anterior interventricular groove and wraps around the apex.  There is a proximal eccentric 50% lesion within an in-stent restenotic segment followed by an greater than 80% focal eccentric in-stent restenotic lesion followed by a 60% lesion after the stented segment and otherwise no coronary atherosclerotic disease of any significance present.  3.  The left circumflex arteries a large-caliber vessel gives rise to large caliber bifurcating obtuse marginal branch.  There is an ostial proximal concentric 50% lesion that is calcified that immediately gives rise to a very high first obtuse marginal branch that is best classified as a ramus intermedius branch supplying large territory in the anterolateral wall that has an ostial proximal 70% lesion.  The calcified continuing circumflex and the posterior AV groove gives a second obtuse marginal branch that has mild diffuse disease, a third obtuse marginal branch and then a bifurcating system that has a bifurcation stenting within it before giving rise to the left PDA.  There is diffuse 50% in-stent restenosis within this bifurcation stented segment into the obtuse marginal branch which  itself has diffuse 50 to 60% disease within the stented segment.  There is a concentric 80% lesion in the circumflex PDA.  No significant coronary atherosclerotic disease present  4.  The right coronary arteries a large-caliber nondominant vessel has a stented mid segment after which there several acute marginal branches there is 50% in-stent restenosis approaching 60% at some portions within this nondominant right proximal to mid proximal segment.     Conclusions:     1.  Normal left heart filling pressures with preserved LV systolic function  2.  Normal epicardial anatomy with severe two-vessel coronary artery disease involving what is likely the culprit for the patient's chest pain syndrome, namely the very severe proximal LAD lesions.  We will treat the remaining circumflex lesions with medical therapy and see if the patient's symptoms resolved; this includes the severe lesion within the proximal portion of the high obtuse marginal branch within the circumflex system as well.     -Status post permanent ventricular pacemaker implantation ([employer] 1/13/2015)  Battery status is 4.5 years.     -History of fever and sepsis.  Positive blood cultures for coagulase-negative staph.      EMBER 7/22/2021 revealed  No evidence for valvular vegetations is present.  Significant mitral regurgitation with central jet extending to the lateral wall and into the superior aspect of the left atrium.  Mild tricuspid regurgitation.  Calculated pulmonary artery pressure is 25 mmHg.  Left atrial and left atrial appendage enlargement is present without clot.  Normal left ventricle size and contractility with ejection fraction of 60%.     -Dyslipidemia diabetes hypertension COPD CKD 3.  History of cirrhosis     -Chronic atrial fibrillation     -History of right foot ulceration with bone involvement.     -Peripheral vascular disease     -Large abdominal ventral hernia.     -Status post appendectomy appendectomy cholecystectomy left elbow  surgery hernia repair     -Allergic to morphine pantoprazole haloperidol  ===========  Plan  ========  Unstable angina.  Cardiac catheterization 10/19/2021 revealed severe coronary artery disease.  Patient was referred for CABG and mitral valve surgery    Have discussed with Dr. Antoine regarding CABG and mitral valve surgery.  Patient was thought to be high risk with significant comorbidities especially risk for infection.  Essentially patient was declined for surgical intervention.  Have discussed with interventionist Dr. Norwood regarding interventional options.  Meanwhile maximize medical treatment.  Continue metoprolol and Imdur.  If blood pressure is a concern losartan dose can be reduced or discontinued.  Further plan will depend on patient's progress.    Renal dysfunction  Renal consultation and follow-up appreciated.  18/1.10-10/19/2021  22/1.3-10/20/2021    Status post pacemaker.  Patient had recently interrogation of the pacemaker today revealed good pacing parameters with battery status of 4.5 years.   Patient is almost 99.7% paced in the ventricle.     Chronic atrial fibrillation.-Rate controlled     Anticoagulation was reviewed.  Patient was on Eliquis  Eliquis is on hold.    Hypertension-113/77     Dyslipidemia-continue atorvastatin  Have discussed with attending physician Dr. Jones for coordination of care.  Have discussed and educated patient regarding coronary artery disease and surgical opinion etc.    Further plan will depend on patient's progress.     ]]]]]]]]]]]]]]]]]]]]]      Celine Swanson MD  10/20/21  06:37 EDT

## 2021-10-20 NOTE — PROGRESS NOTES
"NEPHROLOGY PROGRESS NOTE------[facility]/OPT    [facility]  [phone]  Leonel Camilo MD      Patient Care Team:  Yusuf Jones MD as PCP - Joshua Evangelista MD as Consulting Physician (Cardiology)  Moise Watson MD as Consulting Physician (Cardiac Electrophysiology)  Izzy Alvarez MD as Consulting Physician (Nephrology)  Celine Swanson MD as Consulting Physician (Cardiology)      Provider:  Leonel Camilo MD  Patient Name: Ro Nathan  :  1945    SUBJECTIVE:  F/u CKD  No chest pain or SOA      Medication:  aspirin, 81 mg, Oral, Daily  atorvastatin, 40 mg, Oral, QAM  budesonide, 0.5 mg, Nebulization, BID - RT  clopidogrel, 75 mg, Oral, QAM  furosemide, 20 mg, Oral, QAM  isosorbide mononitrate, 60 mg, Oral, Q24H  metoprolol succinate XL, 25 mg, Oral, Q24H  pregabalin, 150 mg, Oral, BID  rOPINIRole, 0.5 mg, Oral, Nightly  sodium chloride, 3 mL, Intravenous, Q12H  traZODone, 100 mg, Oral, Nightly      sodium chloride, 30 mL/hr        OBJECTIVE    Vital Sign Min/Max for last 24 hours  No data recorded   BP  Min: 75/41  Max: 145/77   Pulse  Min: 70  Max: 85   Resp  Min: 16  Max: 18   SpO2  Min: 95 %  Max: 96 %   No data recorded   No data recorded     Flowsheet Rows      First Filed Value   Admission Height 182.9 cm (72\") Documented at 10/14/2021 1532   Admission Weight 131 kg (288 lb 12.8 oz) Documented at 10/14/2021 1532          I/O this shift:  In: 120 [P.O.:120]  Out: -   I/O last 3 completed shifts:  In: 1440 [P.O.:1440]  Out: 300 [Urine:300]    Physical Exam:  General Appearance: alert, appears stated age and cooperative  Head: normocephalic, without obvious abnormality and atraumatic  Eyes: conjunctivae and sclerae normal and no icterus  Neck: supple and no JVD  Lungs: clear to auscultation and respirations regular  Heart: regular rhythm & normal rate and normal S1, S2  Chest: Wall no abnormalities " observed  Abdomen: normal bowel sounds and soft non-tender  Extremities: moves extremities well, no edema, no cyanosis and no redness  Skin: no bleeding, bruising or rash, turgor normal, color normal and no lesions noted  Neurologic: Alert, and oriented. No focal deficits    Labs:    WBC WBC   Date Value Ref Range Status   10/19/2021 5.90 3.40 - 10.80 10*3/mm3 Final   10/18/2021 6.30 3.40 - 10.80 10*3/mm3 Final   10/17/2021 6.30 3.40 - 10.80 10*3/mm3 Final      HGB Hemoglobin   Date Value Ref Range Status   10/19/2021 11.2 (L) 13.0 - 17.7 g/dL Final   10/18/2021 11.4 (L) 13.0 - 17.7 g/dL Final   10/17/2021 12.8 (L) 13.0 - 17.7 g/dL Final      HCT Hematocrit   Date Value Ref Range Status   10/19/2021 33.3 (L) 37.5 - 51.0 % Final   10/18/2021 33.5 (L) 37.5 - 51.0 % Final   10/17/2021 37.6 37.5 - 51.0 % Final      Platlets No results found for: LABPLAT   MCV MCV   Date Value Ref Range Status   10/19/2021 85.1 79.0 - 97.0 fL Final   10/18/2021 83.6 79.0 - 97.0 fL Final   10/17/2021 86.2 79.0 - 97.0 fL Final          Sodium Sodium   Date Value Ref Range Status   10/20/2021 133 (L) 136 - 145 mmol/L Final   10/19/2021 132 (L) 136 - 145 mmol/L Final   10/19/2021 131 (L) 136 - 145 mmol/L Final   10/18/2021 135 (L) 136 - 145 mmol/L Final   10/17/2021 134 (L) 136 - 145 mmol/L Final      Potassium Potassium   Date Value Ref Range Status   10/20/2021 4.9 3.5 - 5.2 mmol/L Final   10/19/2021 5.4 (H) 3.5 - 5.2 mmol/L Final     Comment:     Slight hemolysis detected by analyzer. Results may be affected.   10/19/2021 4.9 3.5 - 5.2 mmol/L Final     Comment:     Slight hemolysis detected by analyzer. Results may be affected.   10/18/2021 4.8 3.5 - 5.2 mmol/L Final   10/17/2021 4.3 3.5 - 5.2 mmol/L Final      Chloride Chloride   Date Value Ref Range Status   10/20/2021 98 98 - 107 mmol/L Final   10/19/2021 98 98 - 107 mmol/L Final   10/19/2021 98 98 - 107 mmol/L Final   10/18/2021 101 98 - 107 mmol/L Final   10/17/2021 96 (L) 98 - 107  mmol/L Final      CO2 CO2   Date Value Ref Range Status   10/20/2021 26.0 22.0 - 29.0 mmol/L Final   10/19/2021 23.0 22.0 - 29.0 mmol/L Final   10/19/2021 26.0 22.0 - 29.0 mmol/L Final   10/18/2021 24.0 22.0 - 29.0 mmol/L Final   10/17/2021 23.0 22.0 - 29.0 mmol/L Final      BUN BUN   Date Value Ref Range Status   10/20/2021 23 8 - 23 mg/dL Final   10/19/2021 22 8 - 23 mg/dL Final   10/19/2021 18 8 - 23 mg/dL Final   10/18/2021 20 8 - 23 mg/dL Final   10/17/2021 22 8 - 23 mg/dL Final      Creatinine Creatinine   Date Value Ref Range Status   10/20/2021 1.19 0.76 - 1.27 mg/dL Final   10/19/2021 1.33 (H) 0.76 - 1.27 mg/dL Final   10/19/2021 1.10 0.76 - 1.27 mg/dL Final   10/18/2021 1.01 0.76 - 1.27 mg/dL Final   10/17/2021 1.01 0.76 - 1.27 mg/dL Final      Calcium Calcium   Date Value Ref Range Status   10/20/2021 9.2 8.6 - 10.5 mg/dL Final   10/19/2021 8.6 8.6 - 10.5 mg/dL Final   10/19/2021 8.3 (L) 8.6 - 10.5 mg/dL Final   10/18/2021 8.6 8.6 - 10.5 mg/dL Final   10/17/2021 8.6 8.6 - 10.5 mg/dL Final      PO4 No components found for: PO4   Albumin No results found for: ALBUMIN   Magnesium Magnesium   Date Value Ref Range Status   10/18/2021 2.2 1.6 - 2.4 mg/dL Final      Uric Acid No components found for: URIC ACID     Imaging Results (Last 72 Hours)     ** No results found for the last 72 hours. **          Results for orders placed during the hospital encounter of 10/14/21    XR Chest 2 View    Narrative  DATE OF EXAM:  10/14/2021 4:29 PM    PROCEDURE:  XR CHEST 2 VW-    INDICATIONS:  Chest pain.    COMPARISON:  7/23/2021.    TECHNIQUE:  Two radiologic views of the chest.    FINDINGS:  The heart is borderline enlarged. The pulmonary vascular markings are  normal. The lungs and pleural spaces are clear of active disease. There  is a left-sided transvenous pacemaker in place.  There is degenerative  spondylosis of the thoracic spine.    Impression  1. Borderline cardiomegaly.  2. No active pulmonary  disease.    Electronically Signed By-Yusuf Gray MD On:10/14/2021 4:33 PM  This report was finalized on [id number] by  Yusuf Gray MD.      Results for orders placed during the hospital encounter of 07/16/21    XR Chest 1 View    Narrative  Examination: XR CHEST 1 VW-    Date of Exam: 7/23/2021 8:25 PM    Indication: picc tip placement; A41.9-Sepsis, unspecified organism;  R41.0-Disorientation, unspecified.    Comparison: 07/16/2021    Technique: 1 view of the chest    FINDINGS:  There is a right-sided PICC line with its tip in the superior vena cava.  There is a bipolar transvenous pacer unchanged. Heart size appears  mildly enlarged. There is no vascular congestion, airspace opacity, or  pleural effusion noted. Mediastinal contours are stable. No bone  abnormality.    Impression  1. PICC line tip is in the superior vena cava.  2. Mild cardiomegaly. No evidence of active lung disease.    Electronically Signed By-Judith Marie MD On:7/23/2021 9:30 PM  This report was finalized on [id number] by  Judith Marie MD.      XR Foot 3+ View Right    Narrative  DATE OF EXAM:  7/17/2021 4:10 PM    PROCEDURE:  XR FOOT 3+ VW RIGHT-    INDICATIONS:  Right foot wound at the lateral aspect near the fifth metatarsal.  History of diabetes and right foot surgery due to prior infection.    COMPARISON:  04/05/2021    TECHNIQUE:  A minimum of three routine standard radiographic views were obtained of  the right foot.    FINDINGS:  There has been amputation of the fifth metatarsal at the level of the  mid metatarsal. There appears to have been partial amputation of the  remaining distal fifth metatarsal since the prior study.    There appears to be a soft tissue wound at the lateral aspect of the  foot in the area of the resected distal fifth metatarsal. No definite  acute osseous abnormality is seen at the remaining fifth digit compared  to the prior study.  There is soft tissue edema. No significant soft  tissue gas is seen.  There are hammertoe deformities. Degenerative  changes are present in the mid and forefoot, as before. No acute  malalignment or displaced fracture is seen. There is enthesophyte  formation at the calcaneus.    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  2.Lateral soft tissue wound with soft tissue edema which may indicate  skin and soft tissue infection. No significant soft tissue gas is seen.  3.No definite radiographic evidence of acute osseous abnormality.    Electronically Signed By-Joshua Boucher MD On:7/17/2021 5:14 PM  This report was finalized on [id number] by  Joshua Boucher MD.      Results for orders placed during the hospital encounter of 10/02/20    Doppler Ankle Brachial Index Single Level CAR    Interpretation Summary  · Right Conclusion: The right RASHARD is normal. Normal digital pressures.  · Left Conclusion: The left RASHARD is normal. Normal digital pressures.        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 Baseline creatinine 1.05-1.2  · Hypertension  · Type 2 diabetes  · Fluid overload  · Chest pain  · History congestive heart failure     Creatinine stable, potassium okay.  He is off oral KCl now  Seen by surgery.  Awaits decision regarding PCI versus medical treatment  Monitor renal function fluid status electrolytes        Leonel Camilo MD  [facility]  [phone]  10/20/21  11:58 EDT

## 2021-10-20 NOTE — PROGRESS NOTES
LOS: 2 days   Patient Care Team:  Yusuf Jones MD as PCP - General  PhiJoshua MD as Consulting Physician (Cardiology)  Shirley, Moise Kathleen MD as Consulting Physician (Cardiac Electrophysiology)  Izzy Alvarez MD as Consulting Physician (Nephrology)  Celine Swanson MD as Consulting Physician (Cardiology)    Subjective:  Complains of pain, primarily low back and some substernal chest discomfort    Objective:   Afebrile      Review of Systems:   Review of Systems   Constitutional: Positive for activity change.   Respiratory: Negative.    Cardiovascular: Positive for chest pain.   Musculoskeletal: Positive for arthralgias and back pain.           Vital Signs  Heart Rate:  [70-94] 70  Resp:  [16] 16  BP: ()/(33-70) 112/60    Physical Exam:  Physical Exam  Vitals and nursing note reviewed.   Constitutional:       Appearance: Normal appearance.   Cardiovascular:      Rate and Rhythm: Normal rate.   Pulmonary:      Effort: Pulmonary effort is normal.   Abdominal:      Palpations: Abdomen is soft.   Skin:     General: Skin is warm.   Neurological:      General: No focal deficit present.      Mental Status: He is alert and oriented to person, place, and time.          Radiology:  XR Chest 2 View    Result Date: 10/14/2021   1. Borderline cardiomegaly. 2. No active pulmonary disease.  Electronically Signed By-Yusuf Gray MD On:10/14/2021 4:33 PM This report was finalized on [id number] by  Yusuf Gray MD.         Results Review:     I reviewed the patient's new clinical results.  I reviewed the patient's new imaging results and agree with the interpretation.    Medication Review:   Scheduled Meds:[MAR Hold] Acetylcysteine, 600 mg, Oral, BID  aspirin, 81 mg, Oral, Daily  atorvastatin, 40 mg, Oral, QAM  budesonide, 0.5 mg, Nebulization, BID - RT  clopidogrel, 75 mg, Oral, QAM  furosemide, 20 mg, Oral, QAM  isosorbide mononitrate, 60 mg, Oral, Q24H  losartan, 50 mg, Oral,  QAM  metoprolol succinate XL, 25 mg, Oral, Q24H  pregabalin, 150 mg, Oral, BID  rOPINIRole, 0.5 mg, Oral, Nightly  sodium chloride, 3 mL, Intravenous, Q12H  traZODone, 100 mg, Oral, Nightly      Continuous Infusions:   PRN Meds:.•  acetaminophen  •  acetaminophen  •  atropine  •  benzonatate  •  diphenhydrAMINE  •  HYDROmorphone  •  influenza vaccine  •  ipratropium-albuterol  •  nitroglycerin  •  ondansetron  •  ondansetron **OR** ondansetron  •  oxyCODONE  •  [COMPLETED] Insert peripheral IV **AND** sodium chloride  •  sodium chloride  •  sodium chloride    Labs:    CBC    Results from last 7 days   Lab Units 10/19/21  0541 10/18/21  0503 10/17/21  1451 10/16/21  0544 10/15/21  0610 10/14/21  1633   WBC 10*3/mm3 5.90 6.30 6.30 5.60 7.90 8.10   HEMOGLOBIN g/dL 11.2* 11.4* 12.8* 11.7* 12.2* 12.1*   PLATELETS 10*3/mm3 319 297 350 296 310 304     BMP   Results from last 7 days   Lab Units 10/19/21  1509 10/19/21  0533 10/18/21  0503 10/17/21  1451 10/16/21  0544 10/15/21  0610 10/14/21  1633   SODIUM mmol/L 132* 131* 135* 134* 134* 138 135*   POTASSIUM mmol/L 5.4* 4.9 4.8 4.3 4.1 4.3 4.6   CHLORIDE mmol/L 98 98 101 96* 100 100 99   CO2 mmol/L 23.0 26.0 24.0 23.0 22.0 25.0 24.0   BUN mg/dL 22 18 20 22 23 19 21   CREATININE mg/dL 1.33* 1.10 1.01 1.01 1.07 1.09 1.07   GLUCOSE mg/dL 236* 191* 206* 216* 164* 138* 143*   MAGNESIUM mg/dL  --   --  2.2  --   --  1.9  --      Cr Clearance Estimated Creatinine Clearance: 66.3 mL/min (A) (by C-G formula based on SCr of 1.33 mg/dL (H)).  Coag   Results from last 7 days   Lab Units 10/16/21  0544   INR  1.01     HbA1C   Lab Results   Component Value Date    HGBA1C 6.7 (H) 09/28/2021    HGBA1C 9.0 (H) 07/28/2021    HGBA1C 9.3 (H) 07/16/2021     Blood Glucose   Glucose   Date/Time Value Ref Range Status   10/19/2021 1621 250 (H) 70 - 105 mg/dL Final     Comment:     Serial Number: [id number]:  [id number]   10/19/2021 1141 217 (H) 70 - 105 mg/dL Final     Comment:     Serial  Number: [id number]:  [id number]   10/18/2021 1951 164 (H) 70 - 105 mg/dL Final     Comment:     Serial Number: [id number]:  [id number]   10/18/2021 1545 148 (H) 70 - 105 mg/dL Final     Comment:     Serial Number: [id number]:  [id number]     Infection     CMP   Results from last 7 days   Lab Units 10/19/21  1509 10/19/21  0533 10/18/21  0503 10/17/21  1451 10/16/21  0544 10/15/21  0610 10/14/21  1633   SODIUM mmol/L 132* 131* 135* 134* 134* 138 135*   POTASSIUM mmol/L 5.4* 4.9 4.8 4.3 4.1 4.3 4.6   CHLORIDE mmol/L 98 98 101 96* 100 100 99   CO2 mmol/L 23.0 26.0 24.0 23.0 22.0 25.0 24.0   BUN mg/dL 22 18 20 22 23 19 21   CREATININE mg/dL 1.33* 1.10 1.01 1.01 1.07 1.09 1.07   GLUCOSE mg/dL 236* 191* 206* 216* 164* 138* 143*   ALBUMIN g/dL  --   --   --   --   --   --  3.70   BILIRUBIN mg/dL  --   --   --   --   --   --  0.6   ALK PHOS U/L  --   --   --   --   --   --  166*   AST (SGOT) U/L  --   --   --   --   --   --  14   ALT (SGPT) U/L  --   --   --   --   --   --  11     UA      Radiology(recent) No radiology results for the last day   Assessment:      Substernal chest pain secondary to unstable angina pectoris  Status post elective coronary catheterization 10/18/2021 with defecation of proximal 99% disease of the ramus intermedius, circumflex with ostial 80 to 90% disease, first marginal branch with 99% disease, PDA with 90% disease and RCA with 95% disease within the stent in the proximal segment  Hyponatremia  Hyperkalemia  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  Diabetes mellitus type 2 with renal manifestations  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  Diabetic dyslipidemia  Chronic oral anticoagulation  Chronic kidney disease stage IIIa  History of intermittent bowel obstruction secondary to massive ventral hernia  History of right renal mass  History of 4 cm infrarenal abdominal aortic aneurysm  Opiate  dependency  Hypertension associated chronic kidney disease stage IIIa  Panlobular COPD with emphysema  Morbid exogenous obesity  Obesity hypoventilation syndrome  Monoclonal paraproteinemia  Valvular heart disease with significant mitral regurgitation  Mild tricuspid regurgitation  Paroxysmal atrial fibrillation  Hypercoagulable state secondary to atrial fibrillation  Restless leg syndrome  Supplemental oxygen dependency  Dependency upon noninvasive mechanical ventilator  History of prostate cancer  Atherosclerotic heart disease of native coronary arteries with angina pectoris  Chronic mucopurulent bronchitis  Hypoventilation apnea syndrome with ARTI  Herbert's esophageal change  Gastroesophageal reflux disease with esophagitis  Liver cirrhosis  History of alcoholism  Nicotine dependency with nicotine use disorder, cigarettes  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

## 2021-10-21 VITALS
WEIGHT: 282.19 LBS | HEIGHT: 73 IN | DIASTOLIC BLOOD PRESSURE: 66 MMHG | TEMPERATURE: 96.4 F | BODY MASS INDEX: 37.4 KG/M2 | SYSTOLIC BLOOD PRESSURE: 152 MMHG | RESPIRATION RATE: 16 BRPM | HEART RATE: 71 BPM | OXYGEN SATURATION: 97 %

## 2021-10-21 PROBLEM — K70.30 ALCOHOLIC CIRRHOSIS OF LIVER WITHOUT ASCITES (HCC): Status: ACTIVE | Noted: 2021-08-07

## 2021-10-21 PROBLEM — Z79.01 LONG TERM CURRENT USE OF ANTICOAGULANTS WITH INR GOAL OF 2.0-3.0: Status: ACTIVE | Noted: 2021-09-20

## 2021-10-21 LAB
ANION GAP SERPL CALCULATED.3IONS-SCNC: 9 MMOL/L (ref 5–15)
BUN SERPL-MCNC: 20 MG/DL (ref 8–23)
BUN/CREAT SERPL: 17.2 (ref 7–25)
CALCIUM SPEC-SCNC: 9 MG/DL (ref 8.6–10.5)
CHLORIDE SERPL-SCNC: 100 MMOL/L (ref 98–107)
CO2 SERPL-SCNC: 27 MMOL/L (ref 22–29)
CREAT SERPL-MCNC: 1.16 MG/DL (ref 0.76–1.27)
GFR SERPL CREATININE-BSD FRML MDRD: 61 ML/MIN/1.73
GLUCOSE BLDC GLUCOMTR-MCNC: 218 MG/DL (ref 70–105)
GLUCOSE SERPL-MCNC: 182 MG/DL (ref 65–99)
POTASSIUM SERPL-SCNC: 5.2 MMOL/L (ref 3.5–5.2)
SODIUM SERPL-SCNC: 136 MMOL/L (ref 136–145)
WHOLE BLOOD HOLD SPECIMEN: NORMAL

## 2021-10-21 PROCEDURE — 25010000002 HYDROMORPHONE PER 4 MG: Performed by: FAMILY MEDICINE

## 2021-10-21 PROCEDURE — 82962 GLUCOSE BLOOD TEST: CPT

## 2021-10-21 PROCEDURE — 94799 UNLISTED PULMONARY SVC/PX: CPT

## 2021-10-21 PROCEDURE — 80048 BASIC METABOLIC PNL TOTAL CA: CPT | Performed by: FAMILY MEDICINE

## 2021-10-21 PROCEDURE — 99232 SBSQ HOSP IP/OBS MODERATE 35: CPT | Performed by: INTERNAL MEDICINE

## 2021-10-21 RX ORDER — OXYCODONE HYDROCHLORIDE 5 MG/1
10 TABLET ORAL 4 TIMES DAILY
Status: DISCONTINUED | OUTPATIENT
Start: 2021-10-21 | End: 2021-10-21 | Stop reason: HOSPADM

## 2021-10-21 RX ORDER — FUROSEMIDE 40 MG/1
40 TABLET ORAL EVERY MORNING
Status: DISCONTINUED | OUTPATIENT
Start: 2021-10-22 | End: 2021-10-21

## 2021-10-21 RX ORDER — ISOSORBIDE MONONITRATE 60 MG/1
60 TABLET, EXTENDED RELEASE ORAL
Qty: 30 TABLET | Refills: 3 | Status: ON HOLD | OUTPATIENT
Start: 2021-10-21 | End: 2022-01-01

## 2021-10-21 RX ORDER — LOSARTAN POTASSIUM 25 MG/1
25 TABLET ORAL DAILY
Qty: 30 TABLET | Refills: 3 | Status: SHIPPED | OUTPATIENT
Start: 2021-10-21 | End: 2021-10-21 | Stop reason: HOSPADM

## 2021-10-21 RX ADMIN — PREGABALIN 150 MG: 75 CAPSULE ORAL at 10:17

## 2021-10-21 RX ADMIN — OXYCODONE 10 MG: 5 TABLET ORAL at 09:43

## 2021-10-21 RX ADMIN — OXYCODONE 10 MG: 5 TABLET ORAL at 03:26

## 2021-10-21 RX ADMIN — ASPIRIN 81 MG: 81 TABLET, COATED ORAL at 10:17

## 2021-10-21 RX ADMIN — ISOSORBIDE MONONITRATE 60 MG: 60 TABLET, EXTENDED RELEASE ORAL at 10:17

## 2021-10-21 RX ADMIN — IPRATROPIUM BROMIDE AND ALBUTEROL SULFATE 3 ML: 2.5; .5 SOLUTION RESPIRATORY (INHALATION) at 07:20

## 2021-10-21 RX ADMIN — HYDROMORPHONE HYDROCHLORIDE 0.25 MG: 2 INJECTION INTRAMUSCULAR; INTRAVENOUS; SUBCUTANEOUS at 03:26

## 2021-10-21 RX ADMIN — CLOPIDOGREL BISULFATE 75 MG: 75 TABLET ORAL at 10:17

## 2021-10-21 RX ADMIN — BUDESONIDE 0.5 MG: 0.5 SUSPENSION RESPIRATORY (INHALATION) at 07:24

## 2021-10-21 RX ADMIN — Medication 3 ML: at 10:18

## 2021-10-21 RX ADMIN — ATORVASTATIN CALCIUM 40 MG: 40 TABLET, FILM COATED ORAL at 10:17

## 2021-10-21 RX ADMIN — METOPROLOL SUCCINATE 25 MG: 25 TABLET, EXTENDED RELEASE ORAL at 10:17

## 2021-10-21 NOTE — PROGRESS NOTES
Referring Provider: Jeane Sanders MD    Reason for follow-up:  Unstable angina  Status post stent  Chronic atrial fibrillation  Status post pacemaker  Severe coronary artery disease       Patient Care Team:  Yusuf Jones MD as PCP - General  Joshua Yip MD as Consulting Physician (Cardiology)  Moise Watson MD as Consulting Physician (Cardiac Electrophysiology)  Izzy Alvarez MD as Consulting Physician (Nephrology)  Celine Swanson MD as Consulting Physician (Cardiology)    Subjective .  Feeling okay.  ROS    Patient is not having any chest discomfort, shortness of breath, palpitations, dizziness or syncope.  Denies having any headache ,abdominal pain ,nausea, vomiting , diarrhea constipation, loss of weight or loss of appetite.  Denies having any excessive bruising ,hematuria or blood in the stool.    Review of all systems negative except as indicated    History  Past Medical History:   Diagnosis Date   • Alcoholic cirrhosis of liver with ascites ([facility]) 10/26/2020   • Benign hypertension with CKD (chronic kidney disease) stage III ([facility]) 10/26/2020   • Bruises easily    • CHF (congestive heart failure) ([facility])    • Chronic bronchitis with COPD (chronic obstructive pulmonary disease) ([facility]) 10/26/2020   • Chronic respiratory failure with hypoxia ([facility]) 10/26/2020   • Congenital heart defect    • Difficulty attaining erection    • Hypertension    • Low back pain    • Pacemaker    • Peripheral vascular disease due to secondary diabetes ([facility]) 10/26/2020   • Poor circulation    • Prostate cancer ([facility])     prostate   • Shortness of breath    • Sleep apnea     using CPAP    • Stage 3a chronic kidney disease ([facility]) 10/26/2020   • Stented coronary artery 12/05/2019    MICHAEL to LAD   • Type 2 diabetes, controlled, with neuropathy ([facility]) 10/26/2020    no meds   • Type 2 diabetes, controlled, with neuropathy ([facility]) 10/26/2020       Past Surgical History:   Procedure Laterality Date   • ABDOMINAL  SURGERY     • APPENDECTOMY     • BONE CURETTAGE Right 1/22/2021    Procedure: Wound excision with fifth metatarsal head resection, right foot.;  Surgeon: Josef Egan DPM;  Location: [facility] MAIN OR;  Service: Podiatry;  Laterality: Right;   • BONE INCISION AND DRAINAGE Right 10/5/2020    Procedure: Incision and drainage with debridement of all nonviable soft tissue and bone with bone biopsy, right foot.;  Surgeon: Josef Egan DPM;  Location: [facility] MAIN OR;  Service: Podiatry;  Laterality: Right;   • CARDIAC CATHETERIZATION N/A 12/5/2019    Procedure: Left Heart Cath;  Surgeon: Mirza Munson MD;  Location: [facility] CATH INVASIVE LOCATION;  Service: Cardiology   • CARDIAC CATHETERIZATION N/A 12/5/2019    Procedure: Stent MICHAEL coronary;  Surgeon: Mirza Munson MD;  Location: [facility] CATH INVASIVE LOCATION;  Service: Cardiology   • CARDIAC CATHETERIZATION N/A 10/18/2021    Procedure: Left Heart Cath and coronary angiogram;  Surgeon: Celine Swanson MD;  Location: [facility] CATH INVASIVE LOCATION;  Service: Cardiovascular;  Laterality: N/A;   • CARDIAC CATHETERIZATION N/A 10/18/2021    Procedure: Right Heart Cath;  Surgeon: Celine Swanson MD;  Location: [facility] CATH INVASIVE LOCATION;  Service: Cardiovascular;  Laterality: N/A;   • CHOLECYSTECTOMY     • ELBOW PROCEDURE      left   • FOOT SURGERY      right foot   • HERNIA REPAIR     • INCISION AND DRAINAGE OF WOUND Right 4/6/2021    Procedure: INCISION AND DRAINAGE OF RIGHT FOOT 5TH METATARSAL TO BONE AND PARTIAL 5TH METATARSAL RESECTION;  Surgeon: Josfe Egan DPM;  Location: [facility] MAIN OR;  Service: Podiatry;  Laterality: Right;   • INCISION AND DRAINAGE OF WOUND Right 4/8/2021    Procedure: INCISION AND DRAINAGE BONE, DELAYED PRIMARY CLOSURE;  Surgeon: Josef Egan DPM;  Location: [facility] MAIN OR;  Service: Podiatry;  Laterality: Right;   • INTERVENTIONAL RADIOLOGY PROCEDURE N/A 12/5/2019    Procedure: Intravascular  Ultrasound;  Surgeon: Mirza Munson MD;  Location: [facility] CATH INVASIVE LOCATION;  Service: Cardiology   • PACEMAKER IMPLANTATION     • VT RT/LT HEART CATHETERS N/A 2019    Procedure: Percutaneous Coronary Intervention;  Surgeon: Mirza Munson MD;  Location: [facility] CATH INVASIVE LOCATION;  Service: Cardiology   • WOUND DEBRIDEMENT Right 10/29/2020    Procedure: Preparation and debridement of foot wound with application of Integra wound graft, right foot.;  Surgeon: Josef Egan DPM;  Location: [facility] MAIN OR;  Service: Podiatry;  Laterality: Right;   • WOUND DEBRIDEMENT N/A 2021    Procedure: EXCISIONAL ABDOMINAL WOUND  DEBRIDEMENT;  Surgeon: Cleopatra Wang MD;  Location: [facility] MAIN OR;  Service: General;  Laterality: N/A;   • WOUND DEBRIDEMENT N/A 2021    Procedure: DEBRIDEMENT OF ABDOMINAL WALL;  Surgeon: Hill Bhatia DO;  Location: [facility] MAIN OR;  Service: General;  Laterality: N/A;       Family History   Problem Relation Age of Onset   • Alzheimer's disease Mother    • GI problems Father    • Heart disease Father        Social History     Tobacco Use   • Smoking status: Former Smoker     Years: 15.00     Types: Cigarettes     Start date: 1966     Quit date: 5/10/2020     Years since quittin.4   • Smokeless tobacco: Never Used   Vaping Use   • Vaping Use: Never used   Substance Use Topics   • Alcohol use: Yes     Alcohol/week: 4.0 standard drinks     Types: 4 Shots of liquor per week     Comment: maybe 4 shots per month   • Drug use: No        Medications Prior to Admission   Medication Sig Dispense Refill Last Dose   • apixaban (ELIQUIS) 5 MG tablet tablet Take 5 mg by mouth 2 (Two) Times a Day.   10/14/2021 at Unknown time   • aspirin 81 MG EC tablet Take 1 tablet by mouth Daily. 30 tablet 5 10/14/2021 at Unknown time   • atorvastatin (LIPITOR) 40 MG tablet Take 40 mg by mouth Every Morning.   10/14/2021 at Unknown time   • budesonide (PULMICORT) 0.5  MG/2ML nebulizer solution Take 0.5 mg by nebulization 2 (Two) Times a Day As Needed.   10/14/2021 at Unknown time   • clopidogrel (PLAVIX) 75 MG tablet Take 75 mg by mouth Every Morning.   10/14/2021 at Unknown time   • furosemide (LASIX) 20 MG tablet Take 20 mg by mouth Every Morning.   10/14/2021 at Unknown time   • ipratropium-albuterol (DUO-NEB) 0.5-2.5 mg/3 ml nebulizer 3 mL 4 (Four) Times a Day As Needed.   10/14/2021 at Unknown time   • losartan (COZAAR) 50 MG tablet Take 50 mg by mouth Every Morning.   10/14/2021 at Unknown time   • metoprolol tartrate (LOPRESSOR) 25 MG tablet Take 25 mg by mouth 2 (Two) Times a Day.   10/14/2021 at Unknown time   • nitroglycerin (NITROSTAT) 0.4 MG SL tablet Place 0.4 mg under the tongue Every 5 (Five) Minutes As Needed for Chest Pain.   10/14/2021 at Unknown time   • Perforomist 20 MCG/2ML nebulizer solution 20 mcg 2 (Two) Times a Day As Needed.   10/14/2021 at Unknown time   • potassium chloride (K-DUR) 10 MEQ CR tablet Take 10 mEq by mouth 3 (Three) Times a Day With Meals.   10/14/2021 at Unknown time   • pregabalin (LYRICA) 150 MG capsule TAKE ONE CAPSULE BY MOUTH TWICE A DAY 60 capsule 2 10/14/2021 at Unknown time   • rOPINIRole (REQUIP) 0.5 MG tablet Take 0.5 mg by mouth Every Night.   10/13/2021 at Unknown time   • VENTOLIN  (90 Base) MCG/ACT inhaler Inhale 2 puffs Every 6 (Six) Hours As Needed for Wheezing or Shortness of Air.   Past Month at Unknown time   • miconazole (MICOTIN) 2 % cream Apply 1 application topically to the appropriate area as directed Every 12 (Twelve) Hours. 60 g 1 Unknown at Unknown time   • mirtazapine (REMERON) 30 MG tablet Take 30 mg by mouth Every Night.   Unknown at Unknown time   • oxyCODONE-acetaminophen (Percocet)  MG per tablet Take 1 tablet by mouth Every 6 (Six) Hours As Needed for Moderate Pain . 120 tablet 0        Allergies  Haloperidol, Morphine, Pantoprazole, and Latex    Scheduled Meds:aspirin, 81 mg, Oral,  "Daily  atorvastatin, 40 mg, Oral, QAM  budesonide, 0.5 mg, Nebulization, BID - RT  clopidogrel, 75 mg, Oral, QAM  furosemide, 20 mg, Oral, QAM  isosorbide mononitrate, 60 mg, Oral, Q24H  metoprolol succinate XL, 25 mg, Oral, Q24H  pregabalin, 150 mg, Oral, BID  rOPINIRole, 0.5 mg, Oral, Nightly  sodium chloride, 3 mL, Intravenous, Q12H  traZODone, 100 mg, Oral, Nightly      Continuous Infusions:sodium chloride, 30 mL/hr      PRN Meds:.•  acetaminophen  •  acetaminophen  •  atropine  •  benzonatate  •  diphenhydrAMINE  •  HYDROmorphone  •  influenza vaccine  •  ipratropium-albuterol  •  nitroglycerin  •  ondansetron  •  ondansetron **OR** ondansetron  •  oxyCODONE  •  [COMPLETED] Insert peripheral IV **AND** sodium chloride  •  sodium chloride  •  sodium chloride    Objective     VITAL SIGNS  Vitals:    10/21/21 0200 10/21/21 0219 10/21/21 0300 10/21/21 0609   BP:  125/66  146/66   BP Location:  Left arm  Left arm   Patient Position:  Lying  Lying   Pulse: 70 72 75 70   Resp:  17  17   Temp:  97.6 °F (36.4 °C)  97.5 °F (36.4 °C)   TempSrc:  Oral  Oral   SpO2:  97% 96% 97%   Weight:    128 kg (282 lb 3 oz)   Height:           Flowsheet Rows      First Filed Value   Admission Height 182.9 cm (72\") Documented at 10/14/2021 1532   Admission Weight 131 kg (288 lb 12.8 oz) Documented at 10/14/2021 1532            Intake/Output Summary (Last 24 hours) at 10/21/2021 0638  Last data filed at 10/20/2021 1800  Gross per 24 hour   Intake 360 ml   Output --   Net 360 ml        TELEMETRY: Pacemaker rhythm    Physical Exam:  The patient is alert, oriented and in no distress.  Vital signs as noted above.  Exogenous obesity.  Head and neck revealed no carotid bruits or jugular venous distention.  No thyromegaly or lymphadenopathy is present  Lungs clear.  No wheezing.  Breath sounds are normal bilaterally.  Heart normal first and second heart sounds.  No murmur. No precordial rub is present.  No gallop is present.  Abdomen soft and " nontender.  No organomegaly is present.  Significant ventral hernia.  Extremities with good peripheral pulses without any pedal edema.  Skin warm and dry.  Musculoskeletal system is grossly normal  CNS grossly normal      Results Review:   I reviewed the patient's new clinical results.  Lab Results (last 24 hours)     Procedure Component Value Units Date/Time    Extra Tubes [[id number]] Collected: 10/21/21 0336    Specimen: Blood, Venous Line Updated: 10/21/21 0445    Narrative:      The following orders were created for panel order Extra Tubes.  Procedure                               Abnormality         Status                     ---------                               -----------         ------                     Lavender Top[[id number]]                                     Final result                 Please view results for these tests on the individual orders.    Lavender Top [[id number]] Collected: 10/21/21 0336    Specimen: Blood Updated: 10/21/21 0445     Extra Tube hold for add-on     Comment: Auto resulted       Basic Metabolic Panel [[id number]]  (Abnormal) Collected: 10/21/21 0336    Specimen: Blood Updated: 10/21/21 0435     Glucose 182 mg/dL      BUN 20 mg/dL      Creatinine 1.16 mg/dL      Sodium 136 mmol/L      Potassium 5.2 mmol/L      Chloride 100 mmol/L      CO2 27.0 mmol/L      Calcium 9.0 mg/dL      eGFR Non African Amer 61 mL/min/1.73      BUN/Creatinine Ratio 17.2     Anion Gap 9.0 mmol/L     Narrative:      GFR Normal >60  Chronic Kidney Disease <60  Kidney Failure <15      Potassium [[id number]]  (Normal) Collected: 10/20/21 1459    Specimen: Blood Updated: 10/20/21 1526     Potassium 4.8 mmol/L     Basic Metabolic Panel [[id number]]  (Abnormal) Collected: 10/20/21 0738    Specimen: Blood Updated: 10/20/21 0819     Glucose 174 mg/dL      BUN 23 mg/dL      Creatinine 1.19 mg/dL      Sodium 133 mmol/L      Potassium 4.9 mmol/L      Chloride 98 mmol/L      CO2 26.0 mmol/L      Calcium 9.2 mg/dL       eGFR Non African Amer 60 mL/min/1.73      BUN/Creatinine Ratio 19.3     Anion Gap 9.0 mmol/L     Narrative:      GFR Normal >60  Chronic Kidney Disease <60  Kidney Failure <15            Imaging Results (Last 24 Hours)     ** No results found for the last 24 hours. **      LAB RESULTS (LAST 7 DAYS)    CBC  Results from last 7 days   Lab Units 10/19/21  0541 10/18/21  0503 10/17/21  1451 10/16/21  0544 10/15/21  0610 10/14/21  1633   WBC 10*3/mm3 5.90 6.30 6.30 5.60 7.90 8.10   RBC 10*6/mm3 3.91* 4.01* 4.36 4.02* 4.24 4.20   HEMOGLOBIN g/dL 11.2* 11.4* 12.8* 11.7* 12.2* 12.1*   HEMATOCRIT % 33.3* 33.5* 37.6 34.2* 35.2* 35.1*   MCV fL 85.1 83.6 86.2 85.0 82.9 83.5   PLATELETS 10*3/mm3 319 297 350 296 310 304       BMP  Results from last 7 days   Lab Units 10/21/21  0336 10/20/21  1459 10/20/21  0738 10/19/21  1509 10/19/21  0533 10/18/21  0503 10/17/21  1451 10/16/21  0544 10/16/21  0544 10/15/21  0610 10/15/21  0610   SODIUM mmol/L 136  --  133* 132* 131* 135* 134*  --  134*   < > 138   POTASSIUM mmol/L 5.2 4.8 4.9 5.4* 4.9 4.8 4.3   < > 4.1   < > 4.3   CHLORIDE mmol/L 100  --  98 98 98 101 96*  --  100   < > 100   CO2 mmol/L 27.0  --  26.0 23.0 26.0 24.0 23.0  --  22.0   < > 25.0   BUN mg/dL 20  --  23 22 18 20 22  --  23   < > 19   CREATININE mg/dL 1.16  --  1.19 1.33* 1.10 1.01 1.01  --  1.07   < > 1.09   GLUCOSE mg/dL 182*  --  174* 236* 191* 206* 216*  --  164*   < > 138*   MAGNESIUM mg/dL  --   --   --   --   --  2.2  --   --   --   --  1.9    < > = values in this interval not displayed.       CMP   Results from last 7 days   Lab Units 10/21/21  0336 10/20/21  1459 10/20/21  0738 10/19/21  1509 10/19/21  0533 10/18/21  0503 10/17/21  1451 10/16/21  0544 10/16/21  0544 10/15/21  0610 10/14/21  1633   SODIUM mmol/L 136  --  133* 132* 131* 135* 134*  --  134*   < > 135*   POTASSIUM mmol/L 5.2 4.8 4.9 5.4* 4.9 4.8 4.3   < > 4.1   < > 4.6   CHLORIDE mmol/L 100  --  98 98 98 101 96*  --  100   < > 99   CO2 mmol/L  27.0  --  26.0 23.0 26.0 24.0 23.0  --  22.0   < > 24.0   BUN mg/dL 20  --  23 22 18 20 22  --  23   < > 21   CREATININE mg/dL 1.16  --  1.19 1.33* 1.10 1.01 1.01  --  1.07   < > 1.07   GLUCOSE mg/dL 182*  --  174* 236* 191* 206* 216*  --  164*   < > 143*   ALBUMIN g/dL  --   --   --   --   --   --   --   --   --   --  3.70   BILIRUBIN mg/dL  --   --   --   --   --   --   --   --   --   --  0.6   ALK PHOS U/L  --   --   --   --   --   --   --   --   --   --  166*   AST (SGOT) U/L  --   --   --   --   --   --   --   --   --   --  14   ALT (SGPT) U/L  --   --   --   --   --   --   --   --   --   --  11    < > = values in this interval not displayed.         BNP        TROPONIN  Results from last 7 days   Lab Units 10/18/21  0736   TROPONIN T ng/mL <0.010       CoAg  Results from last 7 days   Lab Units 10/16/21  0544   INR  1.01       Creatinine Clearance  Estimated Creatinine Clearance: 76.7 mL/min (by C-G formula based on SCr of 1.16 mg/dL).    ABG        Radiology  No radiology results for the last day            EKG            I personally viewed and interpreted the patient's EKG/Telemetry data: Pacemaker rhythm.  Underlying rhythm is atrial fibrillation.    ECHOCARDIOGRAM:    Results for orders placed during the hospital encounter of 10/14/21    Adult Transesophageal Echo (EMBER) W/ Cont if Necessary Per Protocol    Interpretation Summary  ]]]]]]]]]]]]]]]]]]]]]  Date of study  10/18/2021    Procedure performed  Transesophageal echocardiogram and color Doppler study.    Indications  Mitral regurgitation    Procedure  Anesthesia was provided by anesthesiologist with intravenous Diprivan.  EMEBR probe could be passed without difficulty.  Patient tolerated the procedure well.  No complications were noted.    Results  Technically satisfactory study.  Mitral valve is thickened with adequate opening motion.  Significant mitral regurgitation with central jet is present traversing to the lateral wall and superiorly and into  the pulmonary vein.  Tricuspid valve is normal.  Mild tricuspid regurgitation is present.  Calculated pulmonary artery pressure is at least 40 mmHg.  Aortic valve is tricuspid and is normal.  Left atrium is enlarged.  Left atrial appendage is enlarged without clot.  Right atrium is normal in size.  Right ventricle is normal in size.  Left ventricle is normal in size and contractility with ejection fraction of 60%.  No pericardial effusion or intracardiac thrombus is seen.  Atrial septum is intact  Aorta showed intimal thickening without any clot.    Impression  Significant mitral regurgitation central jet traversing to the lateral wall and superiorly and into the pulmonary vein.  Mild tricuspid regurgitation.  Calculated pulmonary artery pressure is 40 mmHg  Left atrial enlargement.  Left atrial appendage is normal in size without clot.  Left ventricle is normal in size and contractility with ejection fraction of 60%.  ]]]]]]]]]]]]]]]]]]          STRESS MYOVIEW:    Cardiolite (Tc-99m Sestamibi) stress test    CARDIAC CATHETERIZATION:            OTHER:         Assessment/Plan     Active Problems:    Atrial fibrillation (HCC)    CHF due to valvular disease (HCC)    Presence of coronary angioplasty implant and graft    Benign hypertension with chronic kidney disease    AV block, complete (HCC)    Angina of effort (HCC)    Chronic renal insufficiency    Ro Nathan is a 76 y.o. male who presents with history of multiple cardiac and noncardiac problems is documented in the assessment and plan was admitted to the hospital with history of chest pain for last 2 weeks.  Patient has been having chest pain almost every day and has been taking 1-2 nitroglycerin every day.  Sometimes with exertion sometimes at rest.  Patient recently was seen in the office and was scheduled to have stress Cardiolite test.  However patient has significant symptoms and patient came to the emergency room.  EKG showed no acute changes.   Patient denies having any fever cough chills.  Chest discomfort is substernal heaviness and tightness without any radiation of the discomfort into the neck or into the arms.  No associated sweating nausea vomiting or shortness of breath.         ]]]]]]]]]]]]]]]]]]]]]  Impression  ========  -Exertional and nonexertional chest discomfort suggestive of angina pectoris.     -Status post stent placement   Status post stent to proximal LAD 12/5/2019.  Patient had stent to circumflex and RCA in the past.    Cardiac catheterization (right and left heart and EMBER (10/18/2021) reveale.)  Left ventricular size and contractility is normal with ejection fraction of 60%. 2+ mitral regurgitation is present.  Mild to moderate pulmonary hypertension  Left main coronary artery is normal.  Left anterior descending artery is a lengthy stent in the proximal segment and is patent.  Ramus intermedius has proximal 99% disease.  Circumflex coronary artery is a large and dominant vessel that has ostial 80 to 90% disease.  It provided multiple marginal branches and PDA.  First marginal branch has mid segment and 99% disease.  PDA is a large vessel that has proximal lengthy 90% disease.  Right coronary artery is a nondominant vessel that has 95% disease in the proximal segment within the stent.    EMBER 10/18/2021 reviewed  Significant mitral regurgitation central jet traversing to the lateral wall and superiorly and into the pulmonary vein.  Mild tricuspid regurgitation.  Calculated pulmonary artery pressure is 40 mmHg  Left atrial enlargement.  Left atrial appendage is normal in size without clot.  Left ventricle is normal in size and contractility with ejection fraction of 60%.     Cardiac catheterization 12/5/2019 by Dr. Munson  Left ventriculography  The overall estimated left ventricular ejection fraction was 65%.  Native coronary arteriography: Left dominant circulation     1.  Left main coronary arteries a large-caliber vessel gives  rise left anterior descending left circumflex flex arteries.  There is an ostial eccentric 40% lesion that appears angiographically to approach 50%. No significant coronary atherosclerotic disease present.  2.  Left anterior descending artery is a large-caliber vessel that gives rise to large caliber diagonal branches and courses along the anterior interventricular groove and wraps around the apex.  There is a proximal eccentric 50% lesion within an in-stent restenotic segment followed by an greater than 80% focal eccentric in-stent restenotic lesion followed by a 60% lesion after the stented segment and otherwise no coronary atherosclerotic disease of any significance present.  3.  The left circumflex arteries a large-caliber vessel gives rise to large caliber bifurcating obtuse marginal branch.  There is an ostial proximal concentric 50% lesion that is calcified that immediately gives rise to a very high first obtuse marginal branch that is best classified as a ramus intermedius branch supplying large territory in the anterolateral wall that has an ostial proximal 70% lesion.  The calcified continuing circumflex and the posterior AV groove gives a second obtuse marginal branch that has mild diffuse disease, a third obtuse marginal branch and then a bifurcating system that has a bifurcation stenting within it before giving rise to the left PDA.  There is diffuse 50% in-stent restenosis within this bifurcation stented segment into the obtuse marginal branch which itself has diffuse 50 to 60% disease within the stented segment.  There is a concentric 80% lesion in the circumflex PDA.  No significant coronary atherosclerotic disease present  4.  The right coronary arteries a large-caliber nondominant vessel has a stented mid segment after which there several acute marginal branches there is 50% in-stent restenosis approaching 60% at some portions within this nondominant right proximal to mid proximal  segment.     Conclusions:     1.  Normal left heart filling pressures with preserved LV systolic function  2.  Normal epicardial anatomy with severe two-vessel coronary artery disease involving what is likely the culprit for the patient's chest pain syndrome, namely the very severe proximal LAD lesions.  We will treat the remaining circumflex lesions with medical therapy and see if the patient's symptoms resolved; this includes the severe lesion within the proximal portion of the high obtuse marginal branch within the circumflex system as well.     -Status post permanent ventricular pacemaker implantation ([employer] 1/13/2015)  Battery status is 4.5 years.     -History of fever and sepsis.  Positive blood cultures for coagulase-negative staph.      EMBER 7/22/2021 revealed  No evidence for valvular vegetations is present.  Significant mitral regurgitation with central jet extending to the lateral wall and into the superior aspect of the left atrium.  Mild tricuspid regurgitation.  Calculated pulmonary artery pressure is 25 mmHg.  Left atrial and left atrial appendage enlargement is present without clot.  Normal left ventricle size and contractility with ejection fraction of 60%.     -Dyslipidemia diabetes hypertension COPD CKD 3.  History of cirrhosis     -Chronic atrial fibrillation     -History of right foot ulceration with bone involvement.     -Peripheral vascular disease     -Large abdominal ventral hernia.     -Status post appendectomy appendectomy cholecystectomy left elbow surgery hernia repair     -Allergic to morphine pantoprazole haloperidol  ===========  Plan  ========  Unstable angina.  Cardiac catheterization 10/19/2021 revealed severe coronary artery disease.  Patient was referred for CABG and mitral valve surgery    Have discussed with Dr. Antoine regarding CABG and mitral valve surgery.  Patient was thought to be high risk with significant comorbidities especially risk for infection.  Essentially  patient was declined for surgical intervention.  Have discussed with interventionist Dr. Norwood regarding interventional options.  Meanwhile maximize medical treatment.  Continue metoprolol and Imdur.  If blood pressure is a concern losartan dose can be reduced or discontinued.  Further plan will depend on patient's progress.    Renal dysfunction  Renal consultation and follow-up appreciated.  18/1.10-10/19/2021  22/1.3-10/20/2021    Status post pacemaker.  Patient had recently interrogation of the pacemaker today revealed good pacing parameters with battery status of 4.5 years.   Patient is almost 99.7% paced in the ventricle.     Chronic atrial fibrillation.-Rate controlled     Anticoagulation was reviewed.  Patient was on Eliquis  Eliquis is on hold.  Patient to be restarted on Eliquis      Hypertension-113/77     Dyslipidemia-continue atorvastatin  Have discussed with attending physician Dr. Jones for coordination of care.  Have discussed and educated patient regarding coronary artery disease and surgical opinion etc.    Follow-up in the office in 2 weeks.    Further plan will depend on patient's progress.     ]]]]]]]]]]]]]]]]]]]]]      Celine Swanson MD  10/21/21  06:38 EDT

## 2021-10-21 NOTE — PLAN OF CARE
Problem: Adult Inpatient Plan of Care  Goal: Absence of Hospital-Acquired Illness or Injury  Intervention: Identify and Manage Fall Risk  Recent Flowsheet Documentation  Taken 10/21/2021 0334 by Jose Linares RN  Safety Promotion/Fall Prevention:   assistive device/personal items within reach   clutter free environment maintained   nonskid shoes/slippers when out of bed   room organization consistent   safety round/check completed  Taken 10/21/2021 0200 by Jose Linares RN  Safety Promotion/Fall Prevention: safety round/check completed  Taken 10/21/2021 0000 by Jose Linares RN  Safety Promotion/Fall Prevention: safety round/check completed  Taken 10/20/2021 2200 by Jose Linares RN  Safety Promotion/Fall Prevention: safety round/check completed  Taken 10/20/2021 2000 by Jose Linares RN  Safety Promotion/Fall Prevention: safety round/check completed  Intervention: Prevent Skin Injury  Recent Flowsheet Documentation  Taken 10/21/2021 0334 by Jose Linares RN  Body Position: position changed independently  Taken 10/20/2021 2300 by Jose Linares RN  Body Position: position changed independently  Skin Protection:   adhesive use limited   incontinence pads utilized  Intervention: Prevent and Manage VTE (venous thromboembolism) Risk  Recent Flowsheet Documentation  Taken 10/21/2021 0334 by Jose Linares RN  VTE Prevention/Management:   bilateral   sequential compression devices off   patient refused intervention  Taken 10/20/2021 2300 by Jose Linares RN  VTE Prevention/Management:   bilateral   sequential compression devices off   patient refused intervention  Intervention: Prevent Infection  Recent Flowsheet Documentation  Taken 10/21/2021 0334 by Jose Linares RN  Infection Prevention:   environmental surveillance performed   hand hygiene promoted   rest/sleep promoted   single patient room provided  Goal: Optimal Comfort and Wellbeing  Intervention:  Provide Person-Centered Care  Recent Flowsheet Documentation  Taken 10/21/2021 0334 by Jose Linares RN  Trust Relationship/Rapport:   care explained   choices provided   questions answered   questions encouraged  Taken 10/20/2021 2300 by Jose Linares RN  Trust Relationship/Rapport:   care explained   choices provided   questions answered   questions encouraged     Problem: Fall Injury Risk  Goal: Absence of Fall and Fall-Related Injury  Intervention: Identify and Manage Contributors to Fall Injury Risk  Recent Flowsheet Documentation  Taken 10/21/2021 0334 by Jose Linares RN  Medication Review/Management:   medications reviewed   high-risk medications identified  Taken 10/20/2021 2300 by Jose Linares RN  Medication Review/Management:   medications reviewed   high-risk medications identified  Intervention: Promote Injury-Free Environment  Recent Flowsheet Documentation  Taken 10/21/2021 0334 by Jose Linares RN  Safety Promotion/Fall Prevention:   assistive device/personal items within reach   clutter free environment maintained   nonskid shoes/slippers when out of bed   room organization consistent   safety round/check completed  Taken 10/21/2021 0200 by Jose Linares RN  Safety Promotion/Fall Prevention: safety round/check completed  Taken 10/21/2021 0000 by Jose Linares RN  Safety Promotion/Fall Prevention: safety round/check completed  Taken 10/20/2021 2200 by Jose Linares RN  Safety Promotion/Fall Prevention: safety round/check completed  Taken 10/20/2021 2000 by Jose Linares RN  Safety Promotion/Fall Prevention: safety round/check completed     Problem: Heart Failure Comorbidity  Goal: Maintenance of Heart Failure Symptom Control  Intervention: Maintain Heart Failure-Management Strategies  Recent Flowsheet Documentation  Taken 10/21/2021 0334 by Jose Linares RN  Medication Review/Management:   medications reviewed   high-risk medications  identified  Taken 10/20/2021 2300 by Jose Linares RN  Medication Review/Management:   medications reviewed   high-risk medications identified     Problem: Hypertension Comorbidity  Goal: Blood Pressure in Desired Range  Intervention: Maintain Hypertension-Management Strategies  Recent Flowsheet Documentation  Taken 10/21/2021 0334 by Jose Linares RN  Medication Review/Management:   medications reviewed   high-risk medications identified  Taken 10/20/2021 2300 by Jose Linares RN  Medication Review/Management:   medications reviewed   high-risk medications identified     Problem: Adjustment to Illness (Acute Coronary Syndrome)  Goal: Optimal Adaptation to Illness  Intervention: Support Adjustment to Life-Changing Event  Recent Flowsheet Documentation  Taken 10/20/2021 2300 by Jose Linares RN  Supportive Measures:   active listening utilized   decision-making supported     Problem: Arrhythmia/Dysrhythmia (Acute Coronary Syndrome)  Goal: Normalized Cardiac Rhythm  Intervention: Monitor and Manage Cardiac Rhythm Effects  Recent Flowsheet Documentation  Taken 10/21/2021 0334 by Jose Linares RN  VTE Prevention/Management:   bilateral   sequential compression devices off   patient refused intervention  Taken 10/20/2021 2300 by Jose Linares RN  VTE Prevention/Management:   bilateral   sequential compression devices off   patient refused intervention     Problem: Tissue Perfusion (Acute Coronary Syndrome)  Goal: Adequate Tissue Perfusion  Intervention: Optimize Cardiac Tissue Perfusion  Recent Flowsheet Documentation  Taken 10/21/2021 0334 by Jose Linares RN  Activity Management: activity adjusted per tolerance  Taken 10/20/2021 2300 by Jose Linares RN  Activity Management: activity adjusted per tolerance     Problem: Skin Injury Risk Increased  Goal: Skin Health and Integrity  Intervention: Optimize Skin Protection  Recent Flowsheet Documentation  Taken 10/21/2021 0334  by Jose Linares, RN  Head of Bed (HOB): HOB at 30 degrees  Taken 10/20/2021 2300 by Jose Linares, RN  Pressure Reduction Techniques:   frequent weight shift encouraged   weight shift assistance provided  Head of Bed ([facility]): HOB at 30 degrees  Pressure Reduction Devices: pressure-redistributing mattress utilized  Skin Protection:   adhesive use limited   incontinence pads utilized   Goal Outcome Evaluation:

## 2021-10-21 NOTE — CASE MANAGEMENT/SOCIAL WORK
Continued Stay Note  KATLYN Devlin     Patient Name: Ro Nathan  MRN: [id number]  Today's Date: 10/21/2021    Admit Date: 10/14/2021     Discharge Plan     Row Name 10/21/21 0714       Plan    Plan Anticiapte Home with [facility]    Patient/Family in Agreement with Plan yes    Plan Comments d/c barriers: Cardiology and renal following              Phone communication or documentation only - no physical contact with patient or family.        Dilma Toth RN

## 2021-10-21 NOTE — PLAN OF CARE
Pt to discharge home with spouse via car. Pt education was complete pt made aware of follow up apts and medication changes.   Problem: Adult Inpatient Plan of Care  Goal: Plan of Care Review  Outcome: Adequate for Care Transition  Goal: Patient-Specific Goal (Individualized)  Outcome: Adequate for Care Transition  Goal: Absence of Hospital-Acquired Illness or Injury  Outcome: Adequate for Care Transition  Intervention: Identify and Manage Fall Risk  Recent Flowsheet Documentation  Taken 10/21/2021 1000 by Eleanor Cutler LPN  Safety Promotion/Fall Prevention:   activity supervised   assistive device/personal items within reach   clutter free environment maintained   fall prevention program maintained   gait belt   nonskid shoes/slippers when out of bed   safety round/check completed  Taken 10/21/2021 0800 by Eleanor Cutler LPN  Safety Promotion/Fall Prevention:   clutter free environment maintained   nonskid shoes/slippers when out of bed   safety round/check completed  Intervention: Prevent Skin Injury  Recent Flowsheet Documentation  Taken 10/21/2021 0800 by Eleanor Cutler LPN  Skin Protection: adhesive use limited  Intervention: Prevent and Manage VTE (venous thromboembolism) Risk  Recent Flowsheet Documentation  Taken 10/21/2021 0800 by Eleanor Cutler LPN  VTE Prevention/Management: (pt refused SCD's)   bilateral   sequential compression devices off   patient refused intervention  Intervention: Prevent Infection  Recent Flowsheet Documentation  Taken 10/21/2021 1000 by Eleanor Cutler LPN  Infection Prevention:   hand hygiene promoted   single patient room provided  Taken 10/21/2021 0800 by Eleanor Cutler LPN  Infection Prevention: hand hygiene promoted  Goal: Optimal Comfort and Wellbeing  Outcome: Adequate for Care Transition  Intervention: Provide Person-Centered Care  Recent Flowsheet Documentation  Taken 10/21/2021 0800 by Eleanor Cutler LPN  Trust Relationship/Rapport:   care explained   reassurance  provided   thoughts/feelings acknowledged   questions encouraged  Goal: Readiness for Transition of Care  Outcome: Adequate for Care Transition     Problem: Fall Injury Risk  Goal: Absence of Fall and Fall-Related Injury  Outcome: Adequate for Care Transition  Intervention: Identify and Manage Contributors to Fall Injury Risk  Recent Flowsheet Documentation  Taken 10/21/2021 1000 by Eleanor Culter LPN  Medication Review/Management: medications reviewed  Taken 10/21/2021 0800 by Eleanor Cutler LPN  Medication Review/Management: medications reviewed  Intervention: Promote Injury-Free Environment  Recent Flowsheet Documentation  Taken 10/21/2021 1000 by Eleanor Cutler LPN  Safety Promotion/Fall Prevention:   activity supervised   assistive device/personal items within reach   clutter free environment maintained   fall prevention program maintained   gait belt   nonskid shoes/slippers when out of bed   safety round/check completed  Taken 10/21/2021 0800 by Eleanor Cutler LPN  Safety Promotion/Fall Prevention:   clutter free environment maintained   nonskid shoes/slippers when out of bed   safety round/check completed     Problem: Diabetes Comorbidity  Goal: Blood Glucose Level Within Desired Range  Outcome: Adequate for Care Transition  Intervention: Maintain Glycemic Control  Recent Flowsheet Documentation  Taken 10/21/2021 0800 by Eleanor Cutler LPN  Glycemic Management: blood glucose monitoring     Problem: Heart Failure Comorbidity  Goal: Maintenance of Heart Failure Symptom Control  Outcome: Adequate for Care Transition  Intervention: Maintain Heart Failure-Management Strategies  Recent Flowsheet Documentation  Taken 10/21/2021 1000 by Eleanor Cutler LPN  Medication Review/Management: medications reviewed  Taken 10/21/2021 0800 by Eleanor Cutler LPN  Medication Review/Management: medications reviewed     Problem: Hypertension Comorbidity  Goal: Blood Pressure in Desired Range  Outcome: Adequate for Care  Transition  Intervention: Maintain Hypertension-Management Strategies  Recent Flowsheet Documentation  Taken 10/21/2021 1000 by Eleanor Cutler LPN  Medication Review/Management: medications reviewed  Taken 10/21/2021 0800 by Eleanor Cutler LPN  Medication Review/Management: medications reviewed     Problem: Adjustment to Illness (Acute Coronary Syndrome)  Goal: Optimal Adaptation to Illness  Outcome: Adequate for Care Transition  Intervention: Support Adjustment to Life-Changing Event  Recent Flowsheet Documentation  Taken 10/21/2021 0800 by Eleanor Cutler LPN  Supportive Measures: active listening utilized  Family/Support System Care: support provided     Problem: Arrhythmia/Dysrhythmia (Acute Coronary Syndrome)  Goal: Normalized Cardiac Rhythm  Outcome: Adequate for Care Transition  Intervention: Monitor and Manage Cardiac Rhythm Effects  Recent Flowsheet Documentation  Taken 10/21/2021 0800 by Eleanor Cutler LPN  VTE Prevention/Management: (pt refused SCD's)   bilateral   sequential compression devices off   patient refused intervention     Problem: Cardiac-Related Pain (Acute Coronary Syndrome)  Goal: Absence of Cardiac-Related Pain  Outcome: Adequate for Care Transition     Problem: Hemodynamic Instability (Acute Coronary Syndrome)  Goal: Effective Cardiac Pump Function  Outcome: Adequate for Care Transition  Intervention: Optimize Cardiac Function and Blood Flow  Recent Flowsheet Documentation  Taken 10/21/2021 0800 by Eleanor Cutler LPN  Fluid/Electrolyte Management: fluids provided     Problem: Tissue Perfusion (Acute Coronary Syndrome)  Goal: Adequate Tissue Perfusion  Outcome: Adequate for Care Transition  Intervention: Optimize Cardiac Tissue Perfusion  Recent Flowsheet Documentation  Taken 10/21/2021 0800 by Eleanor Cutler LPN  Airway/Ventilation Management: airway patency maintained  Environmental Support: calm environment promoted     Problem: Skin Injury Risk Increased  Goal: Skin Health and  Integrity  Outcome: Adequate for Care Transition  Intervention: Optimize Skin Protection  Recent Flowsheet Documentation  Taken 10/21/2021 0800 by Eleanor Cutler LPN  Pressure Reduction Techniques:   frequent weight shift encouraged   weight shift assistance provided  Pressure Reduction Devices:   pressure-redistributing mattress utilized   positioning supports utilized  Skin Protection: adhesive use limited   Goal Outcome Evaluation:

## 2021-10-21 NOTE — PROGRESS NOTES
"NEPHROLOGY PROGRESS NOTE------[facility]    [facility]  [phone]  Leonel Camilo MD      Patient Care Team:  Yusuf Jones MD as PCP - Joshua Evangelista MD as Consulting Physician (Cardiology)  Moise Watson MD as Consulting Physician (Cardiac Electrophysiology)  Izzy Alvarez MD as Consulting Physician (Nephrology)  Celine Swanson MD as Consulting Physician (Cardiology)      Provider:  Leonel Camilo MD  Patient Name: Ro Nathan  :  1945    SUBJECTIVE:  F/u CKD  No chest pain or SOA      Medication:  aspirin, 81 mg, Oral, Daily  atorvastatin, 40 mg, Oral, QAM  budesonide, 0.5 mg, Nebulization, BID - RT  clopidogrel, 75 mg, Oral, QAM  furosemide, 20 mg, Oral, QAM  isosorbide mononitrate, 60 mg, Oral, Q24H  metoprolol succinate XL, 25 mg, Oral, Q24H  oxyCODONE, 10 mg, Oral, 4x Daily  pregabalin, 150 mg, Oral, BID  rOPINIRole, 0.5 mg, Oral, Nightly  sodium chloride, 3 mL, Intravenous, Q12H  traZODone, 100 mg, Oral, Nightly      sodium chloride, 30 mL/hr        OBJECTIVE    Vital Sign Min/Max for last 24 hours  Temp  Min: 97.5 °F (36.4 °C)  Max: 98.7 °F (37.1 °C)   BP  Min: 101/74  Max: 146/66   Pulse  Min: 70  Max: 99   Resp  Min: 16  Max: 19   SpO2  Min: 95 %  Max: 98 %   No data recorded   Weight  Min: 128 kg (282 lb 3 oz)  Max: 128 kg (282 lb 3 oz)     Flowsheet Rows        First Filed Value   Admission Height 182.9 cm (72\") Documented at 10/14/2021 1532   Admission Weight 131 kg (288 lb 12.8 oz) Documented at 10/14/2021 1532            No intake/output data recorded.  I/O last 3 completed shifts:  In: 360 [P.O.:360]  Out: -     Physical Exam:  General Appearance: alert, appears stated age and cooperative  Head: normocephalic, without obvious abnormality and atraumatic  Eyes: conjunctivae and sclerae normal and no icterus  Neck: supple and no JVD  Lungs: clear to auscultation and respirations " regular  Heart: regular rhythm & normal rate and normal S1, S2  Chest: Wall no abnormalities observed  Abdomen: normal bowel sounds and soft non-tender  Extremities: moves extremities well, no edema, no cyanosis and no redness  Skin: no bleeding, bruising or rash, turgor normal, color normal and no lesions noted  Neurologic: Alert, and oriented. No focal deficits    Labs:    WBC WBC   Date Value Ref Range Status   10/19/2021 5.90 3.40 - 10.80 10*3/mm3 Final      HGB Hemoglobin   Date Value Ref Range Status   10/19/2021 11.2 (L) 13.0 - 17.7 g/dL Final      HCT Hematocrit   Date Value Ref Range Status   10/19/2021 33.3 (L) 37.5 - 51.0 % Final      Platlets No results found for: LABPLAT   MCV MCV   Date Value Ref Range Status   10/19/2021 85.1 79.0 - 97.0 fL Final          Sodium Sodium   Date Value Ref Range Status   10/21/2021 136 136 - 145 mmol/L Final   10/20/2021 133 (L) 136 - 145 mmol/L Final   10/19/2021 132 (L) 136 - 145 mmol/L Final   10/19/2021 131 (L) 136 - 145 mmol/L Final      Potassium Potassium   Date Value Ref Range Status   10/21/2021 5.2 3.5 - 5.2 mmol/L Final   10/20/2021 4.8 3.5 - 5.2 mmol/L Final   10/20/2021 4.9 3.5 - 5.2 mmol/L Final   10/19/2021 5.4 (H) 3.5 - 5.2 mmol/L Final     Comment:     Slight hemolysis detected by analyzer. Results may be affected.   10/19/2021 4.9 3.5 - 5.2 mmol/L Final     Comment:     Slight hemolysis detected by analyzer. Results may be affected.      Chloride Chloride   Date Value Ref Range Status   10/21/2021 100 98 - 107 mmol/L Final   10/20/2021 98 98 - 107 mmol/L Final   10/19/2021 98 98 - 107 mmol/L Final   10/19/2021 98 98 - 107 mmol/L Final      CO2 CO2   Date Value Ref Range Status   10/21/2021 27.0 22.0 - 29.0 mmol/L Final   10/20/2021 26.0 22.0 - 29.0 mmol/L Final   10/19/2021 23.0 22.0 - 29.0 mmol/L Final   10/19/2021 26.0 22.0 - 29.0 mmol/L Final      BUN BUN   Date Value Ref Range Status   10/21/2021 20 8 - 23 mg/dL Final   10/20/2021 23 8 - 23 mg/dL  Final   10/19/2021 22 8 - 23 mg/dL Final   10/19/2021 18 8 - 23 mg/dL Final      Creatinine Creatinine   Date Value Ref Range Status   10/21/2021 1.16 0.76 - 1.27 mg/dL Final   10/20/2021 1.19 0.76 - 1.27 mg/dL Final   10/19/2021 1.33 (H) 0.76 - 1.27 mg/dL Final   10/19/2021 1.10 0.76 - 1.27 mg/dL Final      Calcium Calcium   Date Value Ref Range Status   10/21/2021 9.0 8.6 - 10.5 mg/dL Final   10/20/2021 9.2 8.6 - 10.5 mg/dL Final   10/19/2021 8.6 8.6 - 10.5 mg/dL Final   10/19/2021 8.3 (L) 8.6 - 10.5 mg/dL Final      PO4 No components found for: PO4   Albumin No results found for: ALBUMIN   Magnesium No results found for: MG   Uric Acid No components found for: URIC ACID     Imaging Results (Last 72 Hours)       ** No results found for the last 72 hours. **            Results for orders placed during the hospital encounter of 10/14/21    XR Chest 2 View    Narrative  DATE OF EXAM:  10/14/2021 4:29 PM    PROCEDURE:  XR CHEST 2 VW-    INDICATIONS:  Chest pain.    COMPARISON:  7/23/2021.    TECHNIQUE:  Two radiologic views of the chest.    FINDINGS:  The heart is borderline enlarged. The pulmonary vascular markings are  normal. The lungs and pleural spaces are clear of active disease. There  is a left-sided transvenous pacemaker in place.  There is degenerative  spondylosis of the thoracic spine.    Impression  1. Borderline cardiomegaly.  2. No active pulmonary disease.    Electronically Signed By-Yusuf Gray MD On:10/14/2021 4:33 PM  This report was finalized on [id number] by  Yusuf Gray MD.      Results for orders placed during the hospital encounter of 07/16/21    XR Chest 1 View    Narrative  Examination: XR CHEST 1 VW-    Date of Exam: 7/23/2021 8:25 PM    Indication: picc tip placement; A41.9-Sepsis, unspecified organism;  R41.0-Disorientation, unspecified.    Comparison: 07/16/2021    Technique: 1 view of the chest    FINDINGS:  There is a right-sided PICC line with its tip in the superior vena  cava.  There is a bipolar transvenous pacer unchanged. Heart size appears  mildly enlarged. There is no vascular congestion, airspace opacity, or  pleural effusion noted. Mediastinal contours are stable. No bone  abnormality.    Impression  1. PICC line tip is in the superior vena cava.  2. Mild cardiomegaly. No evidence of active lung disease.    Electronically Signed By-Judith Marie MD On:7/23/2021 9:30 PM  This report was finalized on [id number] by  Judith Marie MD.      XR Foot 3+ View Right    Narrative  DATE OF EXAM:  7/17/2021 4:10 PM    PROCEDURE:  XR FOOT 3+ VW RIGHT-    INDICATIONS:  Right foot wound at the lateral aspect near the fifth metatarsal.  History of diabetes and right foot surgery due to prior infection.    COMPARISON:  04/05/2021    TECHNIQUE:  A minimum of three routine standard radiographic views were obtained of  the right foot.    FINDINGS:  There has been amputation of the fifth metatarsal at the level of the  mid metatarsal. There appears to have been partial amputation of the  remaining distal fifth metatarsal since the prior study.    There appears to be a soft tissue wound at the lateral aspect of the  foot in the area of the resected distal fifth metatarsal. No definite  acute osseous abnormality is seen at the remaining fifth digit compared  to the prior study.  There is soft tissue edema. No significant soft  tissue gas is seen. There are hammertoe deformities. Degenerative  changes are present in the mid and forefoot, as before. No acute  malalignment or displaced fracture is seen. There is enthesophyte  formation at the calcaneus.    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  2.Lateral soft tissue wound with soft tissue edema which may indicate  skin and soft tissue infection. No significant soft tissue gas is seen.  3.No definite radiographic evidence of acute osseous abnormality.    Electronically Signed By-Joshua Boucher MD  On:7/17/2021 5:14 PM  This report was finalized on [id number] by  Joshua Boucher MD.      Results for orders placed during the hospital encounter of 10/02/20    Doppler Ankle Brachial Index Single Level CAR    Interpretation Summary  · Right Conclusion: The right RASHARD is normal. Normal digital pressures.  · Left Conclusion: The left RASHARD is normal. Normal digital pressures.        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 Baseline creatinine 1.05-1.2  Hypertension  Type 2 diabetes  Fluid overload  Chest pain  History congestive heart failure  CAD--medical treatment      CR K stable  Low K diet/ avoi d ACEi/ARBs  Monitor renal function fluid status electrolytes        Leonel Camilo MD  [facility]  [phone]  10/21/21  09:21 EDT

## 2021-10-21 NOTE — DISCHARGE SUMMARY
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  Diabetes mellitus type 2 with renal manifestations  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  Diabetic dyslipidemia  Chronic oral anticoagulation  Chronic kidney disease stage IIIa  History of intermittent bowel obstruction secondary to massive ventral hernia  History of right renal mass  History of 4 cm infrarenal abdominal aortic aneurysm  Opiate dependency  Hypertension associated chronic kidney disease stage IIIa  Panlobular COPD with emphysema  Morbid exogenous obesity  Obesity hypoventilation syndrome  Monoclonal paraproteinemia  Valvular heart disease with significant mitral regurgitation  Mild tricuspid regurgitation  Paroxysmal atrial fibrillation  Hypercoagulable state secondary to atrial fibrillation  Restless leg syndrome  Supplemental oxygen dependency  Dependency upon noninvasive mechanical ventilator  History of prostate cancer  Atherosclerotic heart disease of native coronary arteries with angina pectoris  Chronic mucopurulent bronchitis  Hypoventilation apnea syndrome with ARTI  Herbert's esophageal change  Gastroesophageal reflux disease with esophagitis  Liver cirrhosis  History of alcoholism  Nicotine dependency with nicotine use disorder, cigarettes  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  Autonomic neuropathy secondary to diabetes  Peripheral vascular disease  B12 deficiency  Vitamin D deficiency  Shape  pacemaker placement  History of percutaneous coronary convention with stent placement    Presenting Problem/History of Present Illness  Active Hospital Problems    Diagnosis  POA    Chronic renal insufficiency [N18.9]  Yes    Angina of effort (HCC) [I20.8]  Yes    AV block, 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          Hospital Course  Patient is a 76 y.o. male who presented with unstable angina pectoris.  He was evaluated by cardiology and risk factors were reviewed.  He underwent elective coronary catheterization with history of abnormal stress Myoview and was found to have critical multivessel disease.  He was evaluated by cardiac surgery and felt to be a poor candidate given his many comorbidities.  The decision was made to treat conservatively with maximization of medical therapy to include beta-blockade, statin therapy, ARB therapy, and nitrate therapy.  We will continue oral anticoagulation with direct thrombin inhibition and antiplatelet agents.  He will follow-up with us in the office within 1 to 2 weeks time and with cardiology within 2 to 4 weeks time and he will call with any recurrent symptoms including substernal chest pain shortness of breath or orthopnea/  Nocturnal dyspnea.    Procedures Performed    Procedure(s):  Left Heart Cath and coronary angiogram  Right Heart Cath  -------------------       Consults:   Consults       Date and Time Order Name Status Description    10/16/2021  3:26 PM Inpatient Nephrology Consult Completed     10/15/2021 12:36 AM Inpatient Cardiology Consult Completed     10/14/2021  6:02 PM Family Medicine Consult Completed             Pertinent Test Results:XR Chest 2 View    Result  Date: 10/14/2021   1. Borderline cardiomegaly. 2. No active pulmonary disease.  Electronically Signed By-Yusuf Gray MD On:10/14/2021 4:33 PM This report was finalized on [id number] by  Yusuf Gray MD.      Imaging Results (Last 7 Days)       Procedure Component Value Units Date/Time    XR Chest 2 View [[id number]] Collected: 10/14/21 1632     Updated: 10/14/21 1635    Narrative:      DATE OF EXAM:  10/14/2021 4:29 PM     PROCEDURE:  XR CHEST 2 VW-     INDICATIONS:  Chest pain.       COMPARISON:  7/23/2021.     TECHNIQUE:   Two radiologic views of the chest.     FINDINGS:  The heart is borderline enlarged. The pulmonary vascular markings are  normal. The lungs and pleural spaces are clear of active disease. There  is a left-sided transvenous pacemaker in place.  There is degenerative  spondylosis of the thoracic spine.       Impression:         1. Borderline cardiomegaly.  2. No active pulmonary disease.     Electronically Signed By-Yusuf Gray MD On:10/14/2021 4:33 PM  This report was finalized on [id number] by  Yusuf Gray MD.                Condition on Discharge:  Fair    Vital Signs  Temp:  [97.5 °F (36.4 °C)-98.7 °F (37.1 °C)] 97.5 °F (36.4 °C)  Heart Rate:  [70-99] 70  Resp:  [16-19] 17  BP: (101-146)/() 146/66    Physical Exam:     General Appearance:    Alert, cooperative, in no acute distress   Head:    Normocephalic, without obvious abnormality, atraumatic   Eyes:           Conjunctivae and sclerae normal, no   icterus, no pallor, corneas clear, PERRLA   Throat:   No oral lesions, no thrush, oral mucosa moist   Neck:   No adenopathy, supple, trachea midline, no thyromegaly, no   carotid bruit, no JVD   Lungs:     Clear to auscultation,respirations regular, even and                  unlabored    Heart:    Regular rhythm and normal rate, normal S1 and S2, no            murmur, no gallop, no rub, no click   Chest Wall:    No abnormalities observed   Abdomen:     Normal bowel sounds, no masses,  no organomegaly, soft        non-tender, non-distended, no guarding, no rebound                tenderness   Rectal:     Deferred   Extremities:   Moves all extremities well, no edema, no cyanosis, no             redness   Pulses:   Pulses palpable and equal bilaterally   Skin:   No bleeding, bruising or rash   Lymph nodes:   No palpable adenopathy   Neurologic:   Cranial nerves 2 - 12 grossly intact, sensation intact, DTR       present and equal bilaterally         Discharge Disposition  Home or Self Care    Discharge Medications     Discharge Medications        New Medications        Instructions Start Date   isosorbide mononitrate 60 MG 24 hr tablet  Commonly known as: IMDUR   60 mg, Oral, Every 24 Hours Scheduled             Changes to Medications        Instructions Start Date   losartan 25 MG tablet  Commonly known as: Cozaar  What changed:   medication strength  how much to take  when to take this   25 mg, Oral, Daily             Continue These Medications        Instructions Start Date   apixaban 5 MG tablet tablet  Commonly known as: ELIQUIS   5 mg, Oral, 2 Times Daily      aspirin 81 MG EC tablet   81 mg, Oral, Daily      atorvastatin 40 MG tablet  Commonly known as: LIPITOR   40 mg, Oral, Every Morning      budesonide 0.5 MG/2ML nebulizer solution  Commonly known as: PULMICORT   0.5 mg, Nebulization, 2 Times Daily PRN      clopidogrel 75 MG tablet  Commonly known as: PLAVIX   75 mg, Oral, Every Morning      furosemide 20 MG tablet  Commonly known as: LASIX   20 mg, Oral, Every Morning      ipratropium-albuterol 0.5-2.5 mg/3 ml nebulizer  Commonly known as: DUO-NEB   3 mL, 4 Times Daily PRN      metoprolol tartrate 25 MG tablet  Commonly known as: LOPRESSOR   25 mg, Oral, 2 Times Daily      miconazole 2 % cream  Commonly known as: MICOTIN   1 application, Topical, Every 12 Hours Scheduled      mirtazapine 30 MG tablet  Commonly known as: REMERON   30 mg, Oral, Nightly      nitroglycerin 0.4 MG SL  tablet  Commonly known as: NITROSTAT   0.4 mg, Sublingual, Every 5 Minutes PRN      oxyCODONE-acetaminophen  MG per tablet  Commonly known as: Percocet   1 tablet, Oral, Every 6 Hours PRN      Perforomist 20 MCG/2ML nebulizer solution  Generic drug: formoterol   20 mcg, 2 Times Daily PRN      potassium chloride 10 MEQ CR tablet   10 mEq, Oral, 3 Times Daily With Meals      pregabalin 150 MG capsule  Commonly known as: LYRICA   TAKE ONE CAPSULE BY MOUTH TWICE A DAY      rOPINIRole 0.5 MG tablet  Commonly known as: REQUIP   0.5 mg, Oral, Nightly      Ventolin  (90 Base) MCG/ACT inhaler  Generic drug: albuterol sulfate HFA   2 puffs, Inhalation, Every 6 Hours PRN               Discharge Diet:    Cardiac ADA 1800-calorie  Activity at Discharge:    As tolerated  Follow-up Appointments  Please refer to the body of the discharge summary  Future Appointments   Date Time Provider Department Center   10/26/2021  1:10 PM [facility] WOUND CARE ROOM 3 [facility] None   11/2/2021  1:30 PM Herberth Oconnor MD [facility]     Additional Instructions for the Follow-ups that You Need to Schedule       Ambulatory Referral to Home Health   As directed      Face to Face Visit Date: 10/15/2021    Follow-up provider for Plan of Care?: I will be treating the patient on an ongoing basis.  Please send me the Plan of Care for signature.    Follow-up provider: DONALDO CALLES [[id number]]    Reason/Clinical Findings: post-hospital stay    Describe mobility limitations that make leaving home difficult: tires easily    Nursing/Therapeutic Services Requested: Other (HH to eval and treat)    Frequency: 1 Week 1                 Test Results Pending at Discharge        Donaldo Calles MD  10/21/21  07:17 EDT

## 2021-10-22 ENCOUNTER — READMISSION MANAGEMENT (OUTPATIENT)
Dept: CALL CENTER | Facility: HOSPITAL | Age: 76
End: 2021-10-22

## 2021-10-22 NOTE — OUTREACH NOTE
Prep Survey      Responses   [facility]   Is LACE score < 7 ? No   Emergency Room discharge w/ pulse ox? No   Eligibility Readm Mgmt   Discharge diagnosis Substernal chest pain secondary to unstable angina pectoris   Does the patient have one of the following disease processes/diagnoses(primary or secondary)? Acute MI (STEMI,NSTEMI)   Does the patient have Home health ordered? Yes   What is the Home health agency?  [facility]   Is there a DME ordered? No   General alerts for this patient Heart CAth    Prep survey completed? Yes          Alba Quiñonez RN

## 2021-10-25 NOTE — CASE MANAGEMENT/SOCIAL WORK
Case Management Discharge Note      Final Note: Home with [facility]    Provided Post Acute Provider List?: N/A  Provided Post Acute Provider Quality & Resource List?: N/A    Selected Continued Care - Discharged on 10/21/2021 Admission date: 10/14/2021 - Discharge disposition: Home or Self Care        Home Medical Care Coordination complete.    Service Provider Selected Services Address Phone Fax Patient Preferred    [facility] [address] [place], [place] [id number] [phone] --              Final Discharge Disposition Code: 06 - home with home health care

## 2021-10-26 ENCOUNTER — READMISSION MANAGEMENT (OUTPATIENT)
Dept: CALL CENTER | Facility: HOSPITAL | Age: 76
End: 2021-10-26

## 2021-10-26 ENCOUNTER — OFFICE VISIT (OUTPATIENT)
Dept: WOUND CARE | Facility: HOSPITAL | Age: 76
End: 2021-10-26

## 2021-10-26 PROCEDURE — G0463 HOSPITAL OUTPT CLINIC VISIT: HCPCS

## 2021-10-26 NOTE — OUTREACH NOTE
AMI Week 1 Survey      Responses   [facility] [place] patient discharged from? [facility]   Does the patient have one of the following disease processes/diagnoses(primary or secondary)? Acute MI (STEMI,NSTEMI)   Week 1 attempt successful? No   Unsuccessful attempts Attempt 1   General alerts for this patient Heart CAth    Discharge diagnosis Substernal chest pain secondary to unstable angina pectoris          Lindsay Dueñas RN

## 2021-10-27 ENCOUNTER — READMISSION MANAGEMENT (OUTPATIENT)
Dept: CALL CENTER | Facility: HOSPITAL | Age: 76
End: 2021-10-27

## 2021-10-27 NOTE — OUTREACH NOTE
AMI Week 1 Survey      Responses   [facility] patient discharged from? [facility]   Does the patient have one of the following disease processes/diagnoses(primary or secondary)? Acute MI (STEMI,NSTEMI)   Week 1 attempt successful? Yes   Call start time 1253   Call end time 1256   Discharge diagnosis Substernal chest pain secondary to unstable angina pectoris   Person spoke with today (if not patient) and relationship Meghan-wife   Meds reviewed with patient/caregiver? Yes   Is the patient having any side effects they believe may be caused by any medication additions or changes? No   Does the patient have all prescriptions related to this admission filled (includes statins,anticoagulants,HTN meds,anti-arrhythmia meds) Yes   Is the patient taking all medications as directed (includes completed medication regime)? Yes   Comments regarding appointments Wound care appt on 10/26/21   Does the patient have a primary care provider?  Yes   Does the patient have an appointment with their PCP,cardiologist,or clinic within 7 days of discharge? Greater than 7 days   Comments regarding PCP 11/10/21   What is preventing the patient from scheduling follow up appointments within 7 days of discharge? Haven't had time   Nursing Interventions Verified appointment date/time/provider   Has the patient kept scheduled appointments due by today? N/A   What is the Home health agency?  [facility]   Has home health visited the patient within 72 hours of discharge? Yes   Psychosocial issues? No   Did the patient receive a copy of their discharge instructions? Yes   Nursing interventions Reviewed instructions with patient   What is the patient's perception of their health status since discharge? Same   Nursing interventions Nurse provided patient education   Is the patient/caregiver able to teach back signs and symptoms of when to call for help immediately: Sudden chest discomfort,  Sudden discomfort in arms, back, neck or jaw,  Shortness of breath  at any time,  Sudden sweating or clammy skin   Nursing interventions Nurse provided patient education   Is the patient/caregiver able to teach back lifestyle changes to help prevent MIs Quit smoking,  Heart healthy diet,  Reducing stress,  Limiting alcohol intake   Is the patient/caregiver able to teach back ways to prevent a second heart attack: Take medications,  Follow up with MD,  Manage risk factors   Week 1 call completed? Yes          Janis Marcos RN

## 2021-11-02 ENCOUNTER — APPOINTMENT (OUTPATIENT)
Dept: WOUND CARE | Facility: HOSPITAL | Age: 76
End: 2021-11-02

## 2021-11-02 ENCOUNTER — OFFICE VISIT (OUTPATIENT)
Dept: WOUND CARE | Facility: HOSPITAL | Age: 76
End: 2021-11-02

## 2021-11-02 ENCOUNTER — OFFICE VISIT (OUTPATIENT)
Dept: PAIN MEDICINE | Facility: CLINIC | Age: 76
End: 2021-11-02

## 2021-11-02 VITALS
DIASTOLIC BLOOD PRESSURE: 68 MMHG | OXYGEN SATURATION: 98 % | WEIGHT: 282 LBS | HEART RATE: 89 BPM | BODY MASS INDEX: 38.19 KG/M2 | HEIGHT: 72 IN | SYSTOLIC BLOOD PRESSURE: 146 MMHG | RESPIRATION RATE: 16 BRPM

## 2021-11-02 DIAGNOSIS — M51.36 DDD (DEGENERATIVE DISC DISEASE), LUMBAR: ICD-10-CM

## 2021-11-02 DIAGNOSIS — M54.50 CHRONIC MIDLINE LOW BACK PAIN WITHOUT SCIATICA: Primary | ICD-10-CM

## 2021-11-02 DIAGNOSIS — Z79.899 HIGH RISK MEDICATION USE: Primary | ICD-10-CM

## 2021-11-02 DIAGNOSIS — G89.29 CHRONIC MIDLINE LOW BACK PAIN WITHOUT SCIATICA: Primary | ICD-10-CM

## 2021-11-02 DIAGNOSIS — M47.817 SPONDYLOSIS OF LUMBOSACRAL REGION WITHOUT MYELOPATHY OR RADICULOPATHY: ICD-10-CM

## 2021-11-02 DIAGNOSIS — M54.16 LUMBAR RADICULOPATHY: ICD-10-CM

## 2021-11-02 DIAGNOSIS — M47.27 OSTEOARTHRITIS OF SPINE WITH RADICULOPATHY, LUMBOSACRAL REGION: ICD-10-CM

## 2021-11-02 PROCEDURE — G0463 HOSPITAL OUTPT CLINIC VISIT: HCPCS

## 2021-11-02 PROCEDURE — 99213 OFFICE O/P EST LOW 20 MIN: CPT | Performed by: PHYSICAL MEDICINE & REHABILITATION

## 2021-11-02 RX ORDER — OXYCODONE AND ACETAMINOPHEN 10; 325 MG/1; MG/1
1 TABLET ORAL EVERY 6 HOURS PRN
Qty: 120 TABLET | Refills: 0 | Status: SHIPPED | OUTPATIENT
Start: 2021-11-02 | End: 2021-11-23 | Stop reason: SDUPTHER

## 2021-11-02 RX ORDER — OXYCODONE AND ACETAMINOPHEN 10; 325 MG/1; MG/1
1 TABLET ORAL EVERY 6 HOURS PRN
Qty: 120 TABLET | Refills: 0 | Status: SHIPPED | OUTPATIENT
Start: 2021-11-02 | End: 2022-02-01 | Stop reason: SDUPTHER

## 2021-11-02 RX ORDER — BENZONATATE 100 MG/1
100 CAPSULE ORAL 3 TIMES DAILY PRN
COMMUNITY
Start: 2021-10-14 | End: 2022-01-01 | Stop reason: HOSPADM
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
## 2021-11-04 ENCOUNTER — READMISSION MANAGEMENT (OUTPATIENT)
Dept: CALL CENTER | Facility: HOSPITAL | Age: 76
End: 2021-11-04

## 2021-11-04 NOTE — OUTREACH NOTE
AMI Week 2 Survey      Responses   [facility] patient discharged from? [facility]   Does the patient have one of the following disease processes/diagnoses(primary or secondary)? Acute MI (STEMI,NSTEMI)   Week 2 attempt successful? Yes   Call start time 0821   Call end time 0828   Discharge diagnosis Substernal chest pain secondary to unstable angina pectoris   Is patient permission given to speak with other caregiver? Yes   Person spoke with today (if not patient) and relationship Meghan-wife   Meds reviewed with patient/caregiver? Yes   Is the patient having any side effects they believe may be caused by any medication additions or changes? No   Does the patient have all prescriptions related to this admission filled (includes statins,anticoagulants,HTN meds,anti-arrhythmia meds) Yes   Is the patient taking all medications as directed (includes completed medication regime)? Yes   Comments regarding appointments wpund care- 11/09/2021    Does the patient have a primary care provider?  Yes   Does the patient have an appointment with their PCP,cardiologist,or clinic within 7 days of discharge? Greater than 7 days   What is preventing the patient from scheduling follow up appointments within 7 days of discharge? Earlier appointment not available   Nursing Interventions Verified appointment date/time/provider   Has the patient kept scheduled appointments due by today? Yes   Comments He has seen PCP and wound care. He need to make appt for Cardiac Services. He is planning on choosing a different Cardiac Specialist.    Did the patient receive a copy of their discharge instructions? Yes   Nursing interventions Reviewed instructions with patient   What is the patient's perception of their health status since discharge? Same  [He does have Angina type pain- His PCP is aware. States this is not a new thing.]   Nursing interventions Advised patient to call provider,  Nurse provided patient education   Is the patient/caregiver  able to teach back signs and symptoms of when to call for help immediately: Sudden chest discomfort,  Sudden discomfort in arms, back, neck or jaw,  Shortness of breath at any time,  Sudden sweating or clammy skin   Nursing interventions Nurse provided patient education   Is the pateint /caregiver able to teach back the importance of cardiac rehab? No   Nursing interventions Provided education on importance of cardiac rehab   Is the patient/caregiver able to teach back lifestyle changes to help prevent MIs Quit smoking,  Heart healthy diet,  Reducing stress,  Limiting alcohol intake   Is the patient/caregiver able to teach back ways to prevent a second heart attack: Take medications,  Follow up with MD,  Manage risk factors   If the patient is a current smoker, are they able to teach back resources for cessation? Not a smoker   Additional teach back comments Spoke to wife she states he is doing about the same.  The wound is healing.    Week 2 call completed? Yes          Gypsy Edmonds RN

## 2021-11-09 ENCOUNTER — APPOINTMENT (OUTPATIENT)
Dept: WOUND CARE | Facility: HOSPITAL | Age: 76
End: 2021-11-09

## 2021-11-09 ENCOUNTER — OFFICE VISIT (OUTPATIENT)
Dept: WOUND CARE | Facility: HOSPITAL | Age: 76
End: 2021-11-09

## 2021-11-09 PROCEDURE — G0463 HOSPITAL OUTPT CLINIC VISIT: HCPCS

## 2021-11-10 ENCOUNTER — READMISSION MANAGEMENT (OUTPATIENT)
Dept: CALL CENTER | Facility: HOSPITAL | Age: 76
End: 2021-11-10

## 2021-11-10 NOTE — OUTREACH NOTE
"AMI Week 3 Survey      Responses   [facility] patient discharged from? [facility]   Does the patient have one of the following disease processes/diagnoses(primary or secondary)? Acute MI (STEMI,NSTEMI)   Week 3 attempt successful? Yes   Call start time 0907   Call end time 0911   General alerts for this patient Heart CAth    Meds reviewed with patient/caregiver? Yes   Is the patient having any side effects they believe may be caused by any medication additions or changes? No   Does the patient have all prescriptions related to this admission filled (includes statins,anticoagulants,HTN meds,anti-arrhythmia meds) Yes   Is the patient taking all medications as directed (includes completed medication regime)? Yes   Medication comments PATIENT STATES HE HAS HAD A LOT OF CHEST PAIN AND HAS BEEN \"EATING NITROGLYCERIN LIKE CANDY.\" PATIENT ASKED IF HE HAS A FOLLOW UP APPOINTMENT WITH DR. BARTLETT, AND PATIENT STATES HIS WIFE HASN'T BEEN ABLE TO GET AN APPOINTMENT. THIS NURSE CALLED [facility] AND MADE PATIENT AN APPOINTMENT WITH DR. BARTLETT FOR FIRST AVAILABLE DATE OF 11/23/21. PATIENT IS SEEING DR. CALLES TODAY.    Comments regarding appointments CALL TO DR. BARTLETT'S OFFICE TO SCHEDULE HIS FOLLOW UP APPOINTMENT. APPOINTMENT SCHEDULED FOR 11/23/21 AT 1400. CALL TO PATIENT TO RELAY SAME INFORMATION.    Does the patient have a primary care provider?  Yes   Comments regarding PCP PATIENT HAS AN APPOINTMENT WITH DR. CALLES TODAY.    Has the patient kept scheduled appointments due by today? Yes   What is the Home health agency?  [facility]   Has home health visited the patient within 72 hours of discharge? Yes   Psychosocial issues? No   Did the patient receive a copy of their discharge instructions? Yes   Nursing interventions Reviewed instructions with patient   What is the patient's perception of their health status since discharge? Same  [PATIENT STATES, \"MY CHEST IS HURTING SO MUCH I'M EATING NITROGLYCERINE LIKE CANDY.\" PATIENT IS SEEING " HIS PCP TODAY.]   Nursing interventions Nurse provided patient education   Is the patient/caregiver able to teach back signs and symptoms of when to call for help immediately: Sudden chest discomfort,  Sudden discomfort in arms, back, neck or jaw,  Shortness of breath at any time,  Sudden sweating or clammy skin,  Nausea or vomiting,  Dizziness or lightheadedness,  Irregular or rapid heart rate   Nursing interventions Nurse provided patient education   Is the pateint /caregiver able to teach back the importance of cardiac rehab? Yes   Nursing interventions Provided education on importance of cardiac rehab   Is the patient/caregiver able to teach back lifestyle changes to help prevent MIs Regular exercise as approved by provider,  Heart healthy diet,  Maintaining a healthy weight,  Managing diabetes,  Reducing stress   Is the patient/caregiver able to teach back ways to prevent a second heart attack: Take medications,  Follow up with MD,  Participate in Cardiac Rehab,  Manage risk factors   If the patient is a current smoker, are they able to teach back resources for cessation? Not a smoker   Is the patient/caregiver able to teach back the hierarchy of who to call/visit for symptoms/problems? PCP, Specialist, Home health nurse, Urgent Care, ED, 911 Yes   Week 3 call completed? Yes          Kayleigh Back LPN

## 2021-11-18 ENCOUNTER — READMISSION MANAGEMENT (OUTPATIENT)
Dept: CALL CENTER | Facility: HOSPITAL | Age: 76
End: 2021-11-18

## 2021-11-18 NOTE — OUTREACH NOTE
AMI Week 4 Survey      Responses   [facility] facility patient discharged from? [facility]   Does the patient have one of the following disease processes/diagnoses(primary or secondary)? Acute MI (STEMI,NSTEMI)   Week 4 attempt successful? Yes   Call start time 1100   Rescheduled Rescheduled-pt requested          Marilyn Ponce RN

## 2021-11-22 ENCOUNTER — READMISSION MANAGEMENT (OUTPATIENT)
Dept: CALL CENTER | Facility: HOSPITAL | Age: 76
End: 2021-11-22

## 2021-11-22 NOTE — OUTREACH NOTE
AMI Week 4 Survey      Responses   [facility] [place] patient discharged from? [facility]   Does the patient have one of the following disease processes/diagnoses(primary or secondary)? Acute MI (STEMI,NSTEMI)   Week 4 attempt successful? Yes   Call start time 0955   Call end time 0959   General alerts for this patient Heart CAth    Discharge diagnosis Substernal chest pain secondary to unstable angina pectoris   Is patient permission given to speak with other caregiver? Yes   List who call center can speak with Meghan - wife    Person spoke with today (if not patient) and relationship Meghan-wife   Meds reviewed with patient/caregiver? Yes   Is the patient having any side effects they believe may be caused by any medication additions or changes? No   Is the patient taking all medications as directed (includes completed medication regime)? Yes   Medication comments He has been Cp it occurs with and activity - He is still eating NTG like candy. His wife states that all of the MD's are aware- Declines to go to the Ed.    Has the patient kept scheduled appointments due by today? Yes   Comments He has appts. 11/23/2021 and 11/24/2021.    Is the patient still receiving Home Health Services? N/A   Psychosocial issues? No   What is the patient's perception of their health status since discharge? Worsening  [She reports he is having Cp daily, all MD's are aware is eating NTG like candy- declined the ED. ]   Nursing interventions Nurse provided patient education,  Advised patient to call provider   Is the patient/caregiver able to teach back signs and symptoms of when to call for help immediately: Sudden chest discomfort,  Sudden discomfort in arms, back, neck or jaw,  Shortness of breath at any time,  Sudden sweating or clammy skin,  Nausea or vomiting,  Dizziness or lightheadedness,  Irregular or rapid heart rate   Nursing interventions Nurse provided patient education,  Advised patient to call provider   Is the pateint /caregiver  able to teach back the importance of cardiac rehab? Yes   Nursing interventions Provided education on importance of cardiac rehab   Is the patient/caregiver able to teach back ways to prevent a second heart attack: Take medications,  Follow up with MD,  Participate in Cardiac Rehab,  Manage risk factors   If the patient is a current smoker, are they able to teach back resources for cessation? Not a smoker   Is the patient/caregiver able to teach back the hierarchy of who to call/visit for symptoms/problems? PCP, Specialist, Home health nurse, Urgent Care, ED, 911 Yes   Additional teach back comments Spoke to wife he is having CP- advised to go to the ED, she states the providers are aware would like another call.    Week 4 call completed? Yes   Would the patient like one additional call? Yes          Gypsy Edmonds RN

## 2021-11-23 ENCOUNTER — OFFICE VISIT (OUTPATIENT)
Dept: WOUND CARE | Facility: HOSPITAL | Age: 76
End: 2021-11-23

## 2021-11-23 ENCOUNTER — OFFICE VISIT (OUTPATIENT)
Dept: CARDIOLOGY | Facility: CLINIC | Age: 76
End: 2021-11-23

## 2021-11-23 VITALS
OXYGEN SATURATION: 95 % | DIASTOLIC BLOOD PRESSURE: 70 MMHG | HEIGHT: 72 IN | HEART RATE: 83 BPM | WEIGHT: 292 LBS | BODY MASS INDEX: 39.55 KG/M2 | SYSTOLIC BLOOD PRESSURE: 111 MMHG

## 2021-11-23 DIAGNOSIS — Z95.820 STATUS POST ANGIOPLASTY WITH STENT: ICD-10-CM

## 2021-11-23 DIAGNOSIS — Z95.5 PRESENCE OF CORONARY ANGIOPLASTY IMPLANT AND GRAFT: Primary | ICD-10-CM

## 2021-11-23 DIAGNOSIS — I48.21 PERMANENT ATRIAL FIBRILLATION (HCC): ICD-10-CM

## 2021-11-23 DIAGNOSIS — I44.2 AV BLOCK, COMPLETE (HCC): ICD-10-CM

## 2021-11-23 DIAGNOSIS — E78.5 DYSLIPIDEMIA: ICD-10-CM

## 2021-11-23 DIAGNOSIS — Z95.0 PRESENCE OF CARDIAC PACEMAKER: ICD-10-CM

## 2021-11-23 DIAGNOSIS — I10 ESSENTIAL HYPERTENSION: ICD-10-CM

## 2021-11-23 DIAGNOSIS — Z79.01 CHRONIC ANTICOAGULATION: ICD-10-CM

## 2021-11-23 PROCEDURE — 99214 OFFICE O/P EST MOD 30 MIN: CPT | Performed by: INTERNAL MEDICINE

## 2021-11-23 PROCEDURE — G0463 HOSPITAL OUTPT CLINIC VISIT: HCPCS

## 2021-11-23 RX ORDER — METOLAZONE 5 MG/1
5 TABLET ORAL 3 TIMES WEEKLY
COMMUNITY

## 2021-11-23 RX ORDER — RANOLAZINE 500 MG/1
500 TABLET, EXTENDED RELEASE ORAL 2 TIMES DAILY
COMMUNITY
End: 2022-01-01 | Stop reason: DRUGHIGH

## 2021-11-23 RX ORDER — TRAZODONE HYDROCHLORIDE 100 MG/1
100 TABLET ORAL NIGHTLY
COMMUNITY
End: 2022-01-01 | Stop reason: HOSPADM

## 2021-11-23 NOTE — PROGRESS NOTES
Encounter Date:11/23/2021  Last seen 10/21/2021      Patient ID: Ro Nathan is a 76 y.o. male.    Chief Complaint:  Status post stent  Status post pacemaker  Chronic atrial fibrillation  Anticoagulation management      History of present illness  Patient recently presented with chest pain suggestive of angina pectoris.  Patient had cardiac catheterization and was found to have severe coronary artery disease and mitral regurgitation.  Patient was referred for surgery however cardiovascular surgery declined surgery because of the risk.  Intervention was thought to be a problem because patient has ostial circumflex and ostial ramus intermedius disease.  Patient has occasional chest discomfort since the time of discharge and probably has taken about 2 or 3 nitroglycerin.    Since I have last seen, the patient has been without any shortness of breath, palpitations, dizziness or syncope.  Denies having any headache ,abdominal pain ,nausea, vomiting , diarrhea constipation, loss of weight or loss of appetite.  Denies having any excessive bruising ,hematuria or blood in the stool.    Review of all systems negative except as indicated.    Reviewed ROS.    Assessment and plan  ]]]]]]]]]]]]]]]]]]]]]  Impression  ========  -Exertional and nonexertional chest discomfort suggestive of angina pectoris.     -Status post stent placement   Status post stent to proximal LAD 12/5/2019.  Patient had stent to circumflex and RCA in the past.    -Significant mitral regurgitation (EMBER (10/18/2021    -Significant coronary artery disease involving ostial circumflex coronary artery and ramus intermedius as well as RCA.  Surgery was thought to be high risk and surgery was not offered by cardiovascular surgeons.  Intervention was not favorable due to ostial disease in the circumflex and ramus intermedius.    Cardiac catheterization (right and left heart and EMBER (10/18/2021) reveale.)  Left ventricular size and contractility is normal with  ejection fraction of 60%. 2+ mitral regurgitation is present.  Mild to moderate pulmonary hypertension  Left main coronary artery is normal.  Left anterior descending artery is a lengthy stent in the proximal segment and is patent.  Ramus intermedius has proximal 99% disease.  Circumflex coronary artery is a large and dominant vessel that has ostial 80 to 90% disease.  It provided multiple marginal branches and PDA.  First marginal branch has mid segment and 99% disease.  PDA is a large vessel that has proximal lengthy 90% disease.  Right coronary artery is a nondominant vessel that has 95% disease in the proximal segment within the stent.     EMBER 10/18/2021 reviewed  Significant mitral regurgitation central jet traversing to the lateral wall and superiorly and into the pulmonary vein.  Mild tricuspid regurgitation.  Calculated pulmonary artery pressure is 40 mmHg  Left atrial enlargement.  Left atrial appendage is normal in size without clot.  Left ventricle is normal in size and contractility with ejection fraction of 60%.     Cardiac catheterization 12/5/2019 by Dr. Munson  Left ventriculography  The overall estimated left ventricular ejection fraction was 65%.  Native coronary arteriography: Left dominant circulation     1.  Left main coronary arteries a large-caliber vessel gives rise left anterior descending left circumflex flex arteries.  There is an ostial eccentric 40% lesion that appears angiographically to approach 50%. No significant coronary atherosclerotic disease present.  2.  Left anterior descending artery is a large-caliber vessel that gives rise to large caliber diagonal branches and courses along the anterior interventricular groove and wraps around the apex.  There is a proximal eccentric 50% lesion within an in-stent restenotic segment followed by an greater than 80% focal eccentric in-stent restenotic lesion followed by a 60% lesion after the stented segment and otherwise no coronary  atherosclerotic disease of any significance present.  3.  The left circumflex arteries a large-caliber vessel gives rise to large caliber bifurcating obtuse marginal branch.  There is an ostial proximal concentric 50% lesion that is calcified that immediately gives rise to a very high first obtuse marginal branch that is best classified as a ramus intermedius branch supplying large territory in the anterolateral wall that has an ostial proximal 70% lesion.  The calcified continuing circumflex and the posterior AV groove gives a second obtuse marginal branch that has mild diffuse disease, a third obtuse marginal branch and then a bifurcating system that has a bifurcation stenting within it before giving rise to the left PDA.  There is diffuse 50% in-stent restenosis within this bifurcation stented segment into the obtuse marginal branch which itself has diffuse 50 to 60% disease within the stented segment.  There is a concentric 80% lesion in the circumflex PDA.  No significant coronary atherosclerotic disease present  4.  The right coronary arteries a large-caliber nondominant vessel has a stented mid segment after which there several acute marginal branches there is 50% in-stent restenosis approaching 60% at some portions within this nondominant right proximal to mid proximal segment.     Conclusions:     1.  Normal left heart filling pressures with preserved LV systolic function  2.  Normal epicardial anatomy with severe two-vessel coronary artery disease involving what is likely the culprit for the patient's chest pain syndrome, namely the very severe proximal LAD lesions.  We will treat the remaining circumflex lesions with medical therapy and see if the patient's symptoms resolved; this includes the severe lesion within the proximal portion of the high obtuse marginal branch within the circumflex system as well.     -Status post permanent ventricular pacemaker implantation ([employer] 1/13/2015)  Battery status  is 4.5 years.     -History of fever and sepsis.  Positive blood cultures for coagulase-negative staph.      EMBER 7/22/2021 revealed  No evidence for valvular vegetations is present.  Significant mitral regurgitation with central jet extending to the lateral wall and into the superior aspect of the left atrium.  Mild tricuspid regurgitation.  Calculated pulmonary artery pressure is 25 mmHg.  Left atrial and left atrial appendage enlargement is present without clot.  Normal left ventricle size and contractility with ejection fraction of 60%.     -Dyslipidemia diabetes hypertension COPD CKD 3.  History of cirrhosis     -Chronic atrial fibrillation    Anticoagulation-on Eliquis     -History of right foot ulceration with bone involvement.     -Peripheral vascular disease     -Large abdominal ventral hernia.     -Status post appendectomy appendectomy cholecystectomy left elbow surgery hernia repair     -Allergic to morphine pantoprazole haloperidol  ===========  Plan  ========  Unstable angina.  Cardiac catheterization 10/19/2021 revealed severe coronary artery disease.  Patient was referred for CABG and mitral valve surgery     Have discussed with Dr. Antoine regarding CABG and mitral valve surgery.  Patient was thought to be high risk with significant comorbidities especially risk for infection.  Essentially patient was declined for surgical intervention.    Have discussed with interventionist Dr. Norwood regarding interventional options.  Significant coronary artery disease involving ostial circumflex coronary artery and ramus intermedius as well as RCA.  Intervention was not favorable due to ostial disease in the circumflex and ramus intermedius.    Meanwhile maximize medical treatment.  Continue metoprolol and Imdur.  Patient is on Ranexa and aspirin Imdur metoprolol  If blood pressure is a concern losartan dose can be reduced or discontinued.    Renal dysfunction  Renal consultation and follow-up  appreciated.  18/1.10-10/19/2021  22/1.3-10/20/2021     Status post pacemaker.  Patient had recently interrogation of the pacemaker today revealed good pacing parameters with battery status of 4.5 years.   Patient is almost 99.7% paced in the ventricle.     Chronic atrial fibrillation.-Rate controlled     Anticoagulation was reviewed.  Patient was on Eliquis      Hypertension-110/70    Dyslipidemia-continue atorvastatin    Follow-up in office in 3 months with pacemaker interrogation.    Further plan will depend on patient's progress.     ]]]]]]]]]]]]]]]]]]]]]                    Diagnosis Plan   1. Presence of coronary angioplasty implant and graft     2. Presence of cardiac pacemaker     3. AV block, complete (HCC)     4. Status post angioplasty with stent     5. Essential hypertension     6. Dyslipidemia     7. Chronic anticoagulation     8. Permanent atrial fibrillation (HCC)     LAB RESULTS (LAST 7 DAYS)    CBC        BMP        CMP         BNP        TROPONIN        CoAg        Creatinine Clearance  CrCl cannot be calculated (Patient's most recent lab result is older than the maximum 30 days allowed.).    ABG        Radiology  No radiology results for the last day                The following portions of the patient's history were reviewed and updated as appropriate: allergies, current medications, past family history, past medical history, past social history, past surgical history and problem list.    Review of Systems   Constitutional: Negative for malaise/fatigue.   Cardiovascular: Positive for chest pain. Negative for leg swelling, palpitations and syncope.   Respiratory: Positive for shortness of breath.    Skin: Negative for rash.   Gastrointestinal: Negative for nausea and vomiting.   Neurological: Negative for dizziness, light-headedness and numbness.   All other systems reviewed and are negative.        Current Outpatient Medications:   •  apixaban (ELIQUIS) 5 MG tablet tablet, Take 5 mg by mouth 2  (Two) Times a Day., Disp: , Rfl:   •  aspirin 81 MG EC tablet, Take 1 tablet by mouth Daily., Disp: 30 tablet, Rfl: 5  •  atorvastatin (LIPITOR) 40 MG tablet, Take 40 mg by mouth Every Morning., Disp: , Rfl:   •  benzonatate (TESSALON) 100 MG capsule, , Disp: , Rfl:   •  budesonide (PULMICORT) 0.5 MG/2ML nebulizer solution, Take 0.5 mg by nebulization 2 (Two) Times a Day As Needed., Disp: , Rfl:   •  furosemide (LASIX) 20 MG tablet, Take 20 mg by mouth Every Morning., Disp: , Rfl:   •  ipratropium-albuterol (DUO-NEB) 0.5-2.5 mg/3 ml nebulizer, 3 mL 4 (Four) Times a Day As Needed., Disp: , Rfl:   •  isosorbide mononitrate (IMDUR) 60 MG 24 hr tablet, Take 1 tablet by mouth Daily., Disp: 30 tablet, Rfl: 3  •  metOLazone (ZAROXOLYN) 5 MG tablet, Take 5 mg by mouth Take As Directed. 3 times every week, Disp: , Rfl:   •  metoprolol tartrate (LOPRESSOR) 25 MG tablet, Take 25 mg by mouth 2 (Two) Times a Day., Disp: , Rfl:   •  miconazole (MICOTIN) 2 % cream, Apply 1 application topically to the appropriate area as directed Every 12 (Twelve) Hours., Disp: 60 g, Rfl: 1  •  mirtazapine (REMERON) 30 MG tablet, Take 30 mg by mouth Every Night., Disp: , Rfl:   •  nitroglycerin (NITROSTAT) 0.4 MG SL tablet, Place 0.4 mg under the tongue Every 5 (Five) Minutes As Needed for Chest Pain., Disp: , Rfl:   •  oxyCODONE-acetaminophen (Percocet)  MG per tablet, Take 1 tablet by mouth Every 6 (Six) Hours As Needed for Moderate Pain ., Disp: 120 tablet, Rfl: 0  •  oxyCODONE-acetaminophen (Percocet)  MG per tablet, Take 1 tablet by mouth Every 6 (Six) Hours As Needed for Moderate Pain ., Disp: 120 tablet, Rfl: 0  •  Perforomist 20 MCG/2ML nebulizer solution, 20 mcg 2 (Two) Times a Day As Needed., Disp: , Rfl:   •  ranolazine (RANEXA) 1000 MG 12 hr tablet, Take 1,000 mg by mouth 2 (Two) Times a Day., Disp: , Rfl:   •  rOPINIRole (REQUIP) 0.5 MG tablet, Take 0.5 mg by mouth Every Night., Disp: , Rfl:   •  traZODone (DESYREL) 100 MG  tablet, Take 100 mg by mouth Every Night., Disp: , Rfl:   •  VENTOLIN  (90 Base) MCG/ACT inhaler, Inhale 2 puffs Every 6 (Six) Hours As Needed for Wheezing or Shortness of Air., Disp: , Rfl:   •  pregabalin (LYRICA) 150 MG capsule, TAKE ONE CAPSULE BY MOUTH TWICE A DAY, Disp: 60 capsule, Rfl: 2    Allergies   Allergen Reactions   • Haloperidol Hives   • Morphine Itching   • Pantoprazole Other (See Comments)     Feels sick after receiving   • Latex Unknown - High Severity       Family History   Problem Relation Age of Onset   • Alzheimer's disease Mother    • GI problems Father    • Heart disease Father        Past Surgical History:   Procedure Laterality Date   • ABDOMINAL SURGERY     • APPENDECTOMY     • BONE CURETTAGE Right 1/22/2021    Procedure: Wound excision with fifth metatarsal head resection, right foot.;  Surgeon: Josef Egan DPM;  Location: [facility] MAIN OR;  Service: Podiatry;  Laterality: Right;   • BONE INCISION AND DRAINAGE Right 10/5/2020    Procedure: Incision and drainage with debridement of all nonviable soft tissue and bone with bone biopsy, right foot.;  Surgeon: Josef Egan DPM;  Location: [facility] MAIN OR;  Service: Podiatry;  Laterality: Right;   • CARDIAC CATHETERIZATION N/A 12/5/2019    Procedure: Left Heart Cath;  Surgeon: Mirza Munson MD;  Location: [facility] CATH INVASIVE LOCATION;  Service: Cardiology   • CARDIAC CATHETERIZATION N/A 12/5/2019    Procedure: Stent MICHAEL coronary;  Surgeon: Mirza Munson MD;  Location: [facility] CATH INVASIVE LOCATION;  Service: Cardiology   • CARDIAC CATHETERIZATION N/A 10/18/2021    Procedure: Left Heart Cath and coronary angiogram;  Surgeon: Celine Swanson MD;  Location: [facility] CATH INVASIVE LOCATION;  Service: Cardiovascular;  Laterality: N/A;   • CARDIAC CATHETERIZATION N/A 10/18/2021    Procedure: Right Heart Cath;  Surgeon: Celine Swanson MD;  Location: [facility] CATH INVASIVE LOCATION;  Service:  Cardiovascular;  Laterality: N/A;   • CHOLECYSTECTOMY     • ELBOW PROCEDURE      left   • FOOT SURGERY      right foot   • HERNIA REPAIR     • INCISION AND DRAINAGE OF WOUND Right 4/6/2021    Procedure: INCISION AND DRAINAGE OF RIGHT FOOT 5TH METATARSAL TO BONE AND PARTIAL 5TH METATARSAL RESECTION;  Surgeon: Josef Egan DPM;  Location: [facility] MAIN OR;  Service: Podiatry;  Laterality: Right;   • INCISION AND DRAINAGE OF WOUND Right 4/8/2021    Procedure: INCISION AND DRAINAGE BONE, DELAYED PRIMARY CLOSURE;  Surgeon: Josef Egan DPM;  Location: [facility] MAIN OR;  Service: Podiatry;  Laterality: Right;   • INTERVENTIONAL RADIOLOGY PROCEDURE N/A 12/5/2019    Procedure: Intravascular Ultrasound;  Surgeon: Mirza Munson MD;  Location: [facility] CATH INVASIVE LOCATION;  Service: Cardiology   • PACEMAKER IMPLANTATION     • MS RT/LT HEART CATHETERS N/A 12/5/2019    Procedure: Percutaneous Coronary Intervention;  Surgeon: Mirza Munson MD;  Location: [facility] CATH INVASIVE LOCATION;  Service: Cardiology   • WOUND DEBRIDEMENT Right 10/29/2020    Procedure: Preparation and debridement of foot wound with application of Integra wound graft, right foot.;  Surgeon: Josef Egan DPM;  Location: [facility] MAIN OR;  Service: Podiatry;  Laterality: Right;   • WOUND DEBRIDEMENT N/A 7/29/2021    Procedure: EXCISIONAL ABDOMINAL WOUND  DEBRIDEMENT;  Surgeon: Cleopatra Wang MD;  Location: [facility] MAIN OR;  Service: General;  Laterality: N/A;   • WOUND DEBRIDEMENT N/A 8/1/2021    Procedure: DEBRIDEMENT OF ABDOMINAL WALL;  Surgeon: Hill Bhatia DO;  Location: [facility] MAIN OR;  Service: General;  Laterality: N/A;       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) 10/26/2020   • Benign hypertension with CKD (chronic kidney disease) stage III (HCC) 10/26/2020   • Bruises easily    • CHF (congestive heart failure) (HCC)    • Chronic bronchitis with COPD (chronic  "obstructive pulmonary disease) ([facility]) 10/26/2020   • Chronic respiratory failure with hypoxia ([facility]) 10/26/2020   • Congenital heart defect    • Difficulty attaining erection    • Heart block    • Hernia of abdominal wall    • Hypertension    • Low back pain    • Pacemaker    • Peripheral vascular disease due to secondary diabetes ([facility]) 10/26/2020   • Poor circulation    • Prostate cancer ([facility])     prostate   • Shortness of breath    • Sleep apnea     using CPAP    • Stage 3a chronic kidney disease ([facility]) 10/26/2020   • Stented coronary artery 2019    MICHAEL to LAD   • Type 2 diabetes, controlled, with neuropathy ([facility]) 10/26/2020    no meds   • Type 2 diabetes, controlled, with neuropathy ([facility]) 10/26/2020       Family History   Problem Relation Age of Onset   • Alzheimer's disease Mother    • GI problems Father    • Heart disease Father        Social History     Socioeconomic History   • Marital status:    Tobacco Use   • Smoking status: Former Smoker     Years: 15.00     Types: Cigarettes     Start date: 1966     Quit date: 5/10/2020     Years since quittin.5   • Smokeless tobacco: Never Used   Vaping Use   • Vaping Use: Never used   Substance and Sexual Activity   • Alcohol use: Yes     Alcohol/week: 4.0 standard drinks     Types: 4 Shots of liquor per week     Comment: maybe 4 shots per month   • Drug use: No   • Sexual activity: Defer         Procedures      Objective:       Physical Exam    /70 (BP Location: Right arm, Patient Position: Sitting, Cuff Size: Large Adult)   Pulse 83   Ht 182.9 cm (72\")   Wt 132 kg (292 lb)   SpO2 95%   BMI 39.60 kg/m²   The patient is alert, oriented and in no distress.    Vital signs as noted above.    Head and neck revealed no carotid bruits or jugular venous distension.  No thyromegaly or lymphadenopathy is present.    Lungs clear.  No wheezing.  Breath sounds are normal bilaterally.    Heart normal first and second heart sounds.  No murmur..  No " pericardial rub is present.  No gallop is present.    Abdomen soft and nontender.  No organomegaly is present.  Large ventral hernia is present.    Extremities revealed good peripheral pulses without any pedal edema.    Skin warm and dry.    Musculoskeletal system is grossly normal.    CNS grossly normal.    Reviewed and unchanged from last visit.

## 2021-11-30 ENCOUNTER — APPOINTMENT (OUTPATIENT)
Dept: WOUND CARE | Facility: HOSPITAL | Age: 76
End: 2021-11-30

## 2021-12-01 ENCOUNTER — READMISSION MANAGEMENT (OUTPATIENT)
Dept: CALL CENTER | Facility: HOSPITAL | Age: 76
End: 2021-12-01

## 2021-12-01 NOTE — PROGRESS NOTES
"Pharmacy Antimicrobial Dosing Service    Subjective:  Ro Nathan is a 74 y.o.male admitted with small bowel obstruction. Pharmacy has been consulted to dose vancomycin.        Assessment/Plan    1. Day #1 Vancomycin: Goal -600 mcg*h/mL. Start vancomycin 2000 mg (~20 mg/kg DBW) IV once, followed by 1500 mg (~15 mg/kg DBW) IV q24h. Obtain peak 3/18 at 2100 and trough at 3/19 at 1600 for clinical review, or earlier, if clinically warranted.     2. Day #1 ampicillin/sulbactam Ampicillin/sulbactam: 3gm IV q6h for estCrCl > 30 mL/min.    Will continue to monitor drug levels, renal function, culture and sensitivities, and patient clinical status.       Objective:  Relevant clinical data and objective history reviewed:  188 cm (74\")   122 kg (268 lb 15.4 oz)   Ideal body weight: 82.2 kg (181 lb 3.5 oz)  Adjusted ideal body weight: 98.1 kg (216 lb 5 oz)  Body mass index is 34.53 kg/m².        Results from last 7 days   Lab Units 03/15/20  1336   CREATININE mg/dL 1.60*     Estimated Creatinine Clearance: 57.1 mL/min (A) (by C-G formula based on SCr of 1.6 mg/dL (H)).  No intake/output data recorded.    Results from last 7 days   Lab Units 03/15/20  1336   WBC 10*3/mm3 10.50     Temperature    03/15/20 1308   Temp: 97 °F (36.1 °C)     Baseline culture/source/susceptibility:  Microbiology Results (last 10 days)       ** No results found for the last 240 hours. **             Anti-Infectives (From admission, onward)      Ordered     Dose/Rate Route Frequency Start Stop    03/15/20 1623  !Vancomycin Level Draw Needed     Ordering Provider:  Rodolfo Du MD     Does not apply Once 03/19/20 1600      03/15/20 1623  !Vancomycin Level Draw Needed     Ordering Provider:  Rodolfo Du MD     Does not apply Once 03/18/20 2100      03/15/20 1623  vancomycin IVPB 1500 mg in 0.9% NaCl (Premix) 250 mL     Ordering Provider:  Rodolfo Du MD    15 mg/kg × 98.1 kg (Adjusted) Intravenous Every 24 Hours 03/16/20 " Speech Language Therapy Discharge Summary    Reason for therapy discharge:    Discharged to home.    Progress towards therapy goal(s). See goals on Care Plan in [facility] electronic health record for goal details.  Goals partially met.  Barriers to achieving goals:   discharge from facility.    Therapy recommendation(s):    Continued therapy is recommended.  Rationale/Recommendations:  Suspect oropharyngeal swallow function is at baseline; consider SLP HC if any concerns arise.    By time of discharge today pt was back to baseline of puree (IDDSI 4), moderately thick (IDDSI 3) with surgery approval for ADAT per their last note. 1:1 assist, fully upright, slow pacing, encourage small bites/sips, verify each swallow.       1700 03/23/20 1659    03/15/20 1603  ampicillin-sulbactam (UNASYN) 3 g in sodium chloride 0.9 % 100 mL IVPB-MBP     Ordering Provider:  Rodolfo Du MD    3 g Intravenous Every 6 Hours 03/15/20 1700 03/27/20 1659    03/15/20 1615  vancomycin 2000 mg/500 mL 0.9% NS IVPB ([facility])     Ordering Provider:  Rodolfo Du MD    20 mg/kg × 98.1 kg (Adjusted) Intravenous Once 03/15/20 1700      03/15/20 1604  Pharmacy to dose vancomycin     Ordering Provider:  Rodolfo Du MD     Does not apply Continuous PRN 03/15/20 1603 03/20/20 1602            Rita Newman, PharmD  03/15/20 16:28

## 2021-12-14 ENCOUNTER — OFFICE VISIT (OUTPATIENT)
Dept: WOUND CARE | Facility: HOSPITAL | Age: 76
End: 2021-12-14

## 2021-12-14 PROCEDURE — G0463 HOSPITAL OUTPT CLINIC VISIT: HCPCS

## 2021-12-28 ENCOUNTER — OFFICE VISIT (OUTPATIENT)
Dept: WOUND CARE | Facility: HOSPITAL | Age: 76
End: 2021-12-28

## 2021-12-28 PROCEDURE — G0463 HOSPITAL OUTPT CLINIC VISIT: HCPCS

## 2022-01-01 ENCOUNTER — OFFICE VISIT (OUTPATIENT)
Dept: WOUND CARE | Facility: HOSPITAL | Age: 77
End: 2022-01-01

## 2022-01-01 ENCOUNTER — READMISSION MANAGEMENT (OUTPATIENT)
Dept: CALL CENTER | Facility: HOSPITAL | Age: 77
End: 2022-01-01

## 2022-01-01 ENCOUNTER — APPOINTMENT (OUTPATIENT)
Dept: GENERAL RADIOLOGY | Facility: HOSPITAL | Age: 77
End: 2022-01-01

## 2022-01-01 ENCOUNTER — APPOINTMENT (OUTPATIENT)
Dept: CT IMAGING | Facility: HOSPITAL | Age: 77
End: 2022-01-01

## 2022-01-01 ENCOUNTER — APPOINTMENT (OUTPATIENT)
Dept: WOUND CARE | Facility: HOSPITAL | Age: 77
End: 2022-01-01

## 2022-01-01 ENCOUNTER — APPOINTMENT (OUTPATIENT)
Dept: CARDIOLOGY | Facility: HOSPITAL | Age: 77
End: 2022-01-01

## 2022-01-01 ENCOUNTER — HOSPITAL ENCOUNTER (INPATIENT)
Facility: HOSPITAL | Age: 77
LOS: 8 days | Discharge: HOME HEALTH | End: 2022-04-27
Attending: EMERGENCY MEDICINE | Admitting: FAMILY MEDICINE

## 2022-01-01 ENCOUNTER — LAB REQUISITION (OUTPATIENT)
Dept: LAB | Facility: HOSPITAL | Age: 77
End: 2022-01-01

## 2022-01-01 ENCOUNTER — OFFICE VISIT (OUTPATIENT)
Dept: PAIN MEDICINE | Facility: CLINIC | Age: 77
End: 2022-01-01

## 2022-01-01 ENCOUNTER — OFFICE VISIT (OUTPATIENT)
Dept: CARDIOLOGY | Facility: CLINIC | Age: 77
End: 2022-01-01

## 2022-01-01 ENCOUNTER — OFFICE VISIT (OUTPATIENT)
Dept: ORTHOPEDIC SURGERY | Facility: CLINIC | Age: 77
End: 2022-01-01

## 2022-01-01 ENCOUNTER — HOSPITAL ENCOUNTER (INPATIENT)
Facility: HOSPITAL | Age: 77
LOS: 7 days | Discharge: HOME HEALTH | End: 2022-06-22
Attending: EMERGENCY MEDICINE | Admitting: FAMILY MEDICINE

## 2022-01-01 ENCOUNTER — HOSPITAL ENCOUNTER (INPATIENT)
Facility: HOSPITAL | Age: 77
LOS: 2 days | Discharge: HOME HEALTH | End: 2022-12-10
Attending: EMERGENCY MEDICINE | Admitting: FAMILY MEDICINE

## 2022-01-01 ENCOUNTER — ANESTHESIA (OUTPATIENT)
Dept: GASTROENTEROLOGY | Facility: HOSPITAL | Age: 77
End: 2022-01-01

## 2022-01-01 ENCOUNTER — HOSPITAL ENCOUNTER (INPATIENT)
Facility: HOSPITAL | Age: 77
LOS: 9 days | Discharge: HOME HEALTH | End: 2022-10-20
Attending: EMERGENCY MEDICINE | Admitting: FAMILY MEDICINE

## 2022-01-01 ENCOUNTER — ANESTHESIA (OUTPATIENT)
Dept: PERIOP | Facility: HOSPITAL | Age: 77
End: 2022-01-01

## 2022-01-01 ENCOUNTER — APPOINTMENT (OUTPATIENT)
Dept: NUCLEAR MEDICINE | Facility: HOSPITAL | Age: 77
End: 2022-01-01

## 2022-01-01 ENCOUNTER — HOSPITAL ENCOUNTER (OUTPATIENT)
Facility: HOSPITAL | Age: 77
Setting detail: OBSERVATION
Discharge: HOME | End: 2022-06-06
Attending: EMERGENCY MEDICINE | Admitting: FAMILY MEDICINE

## 2022-01-01 ENCOUNTER — HOSPITAL ENCOUNTER (INPATIENT)
Facility: HOSPITAL | Age: 77
LOS: 2 days | Discharge: HOME HEALTH | End: 2022-11-30
Attending: EMERGENCY MEDICINE | Admitting: FAMILY MEDICINE

## 2022-01-01 ENCOUNTER — ANESTHESIA EVENT (OUTPATIENT)
Dept: GASTROENTEROLOGY | Facility: HOSPITAL | Age: 77
End: 2022-01-01

## 2022-01-01 ENCOUNTER — HOSPITAL ENCOUNTER (INPATIENT)
Facility: HOSPITAL | Age: 77
LOS: 4 days | Discharge: HOME | End: 2022-09-22
Attending: EMERGENCY MEDICINE | Admitting: FAMILY MEDICINE

## 2022-01-01 ENCOUNTER — HOSPITAL ENCOUNTER (EMERGENCY)
Facility: HOSPITAL | Age: 77
Discharge: LEFT BEFORE DISCHARGE | End: 2022-07-20

## 2022-01-01 ENCOUNTER — APPOINTMENT (OUTPATIENT)
Dept: GENERAL RADIOLOGY | Facility: HOSPITAL | Age: 77
DRG: 558 | End: 2022-01-01
Payer: MEDICARE

## 2022-01-01 ENCOUNTER — HOSPITAL ENCOUNTER (OUTPATIENT)
Facility: HOSPITAL | Age: 77
Discharge: HOME | End: 2022-07-08
Attending: EMERGENCY MEDICINE | Admitting: FAMILY MEDICINE

## 2022-01-01 ENCOUNTER — CLINICAL SUPPORT NO REQUIREMENTS (OUTPATIENT)
Dept: CARDIOLOGY | Facility: CLINIC | Age: 77
End: 2022-01-01

## 2022-01-01 ENCOUNTER — HOSPITAL ENCOUNTER (INPATIENT)
Facility: HOSPITAL | Age: 77
LOS: 4 days | Discharge: HOME HEALTH | DRG: 558 | End: 2022-10-01
Attending: EMERGENCY MEDICINE | Admitting: FAMILY MEDICINE
Payer: MEDICARE

## 2022-01-01 ENCOUNTER — APPOINTMENT (OUTPATIENT)
Dept: CT IMAGING | Facility: HOSPITAL | Age: 77
DRG: 558 | End: 2022-01-01
Payer: MEDICARE

## 2022-01-01 ENCOUNTER — TELEPHONE (OUTPATIENT)
Dept: ORTHOPEDIC SURGERY | Facility: CLINIC | Age: 77
End: 2022-01-01

## 2022-01-01 ENCOUNTER — APPOINTMENT (OUTPATIENT)
Dept: INTERVENTIONAL RADIOLOGY/VASCULAR | Facility: HOSPITAL | Age: 77
End: 2022-01-01

## 2022-01-01 ENCOUNTER — ANESTHESIA EVENT (OUTPATIENT)
Dept: PERIOP | Facility: HOSPITAL | Age: 77
End: 2022-01-01

## 2022-01-01 VITALS
DIASTOLIC BLOOD PRESSURE: 86 MMHG | HEIGHT: 72 IN | TEMPERATURE: 97.5 F | SYSTOLIC BLOOD PRESSURE: 156 MMHG | RESPIRATION RATE: 18 BRPM | BODY MASS INDEX: 38.6 KG/M2 | OXYGEN SATURATION: 95 % | WEIGHT: 285 LBS | HEART RATE: 70 BPM

## 2022-01-01 VITALS
HEART RATE: 88 BPM | OXYGEN SATURATION: 97 % | DIASTOLIC BLOOD PRESSURE: 70 MMHG | SYSTOLIC BLOOD PRESSURE: 147 MMHG | RESPIRATION RATE: 16 BRPM | WEIGHT: 279 LBS | BODY MASS INDEX: 37.84 KG/M2

## 2022-01-01 VITALS
OXYGEN SATURATION: 95 % | SYSTOLIC BLOOD PRESSURE: 121 MMHG | BODY MASS INDEX: 37.11 KG/M2 | DIASTOLIC BLOOD PRESSURE: 74 MMHG | HEIGHT: 72 IN | HEART RATE: 85 BPM | WEIGHT: 274 LBS

## 2022-01-01 VITALS
WEIGHT: 285 LBS | DIASTOLIC BLOOD PRESSURE: 64 MMHG | OXYGEN SATURATION: 95 % | RESPIRATION RATE: 16 BRPM | BODY MASS INDEX: 38.65 KG/M2 | HEART RATE: 85 BPM | SYSTOLIC BLOOD PRESSURE: 112 MMHG

## 2022-01-01 VITALS
WEIGHT: 278.44 LBS | DIASTOLIC BLOOD PRESSURE: 72 MMHG | RESPIRATION RATE: 18 BRPM | HEIGHT: 72 IN | OXYGEN SATURATION: 95 % | SYSTOLIC BLOOD PRESSURE: 152 MMHG | TEMPERATURE: 97.6 F | HEART RATE: 73 BPM | BODY MASS INDEX: 37.71 KG/M2

## 2022-01-01 VITALS
HEIGHT: 72 IN | BODY MASS INDEX: 38.22 KG/M2 | SYSTOLIC BLOOD PRESSURE: 111 MMHG | WEIGHT: 282.19 LBS | OXYGEN SATURATION: 95 % | DIASTOLIC BLOOD PRESSURE: 57 MMHG | TEMPERATURE: 97.6 F | RESPIRATION RATE: 18 BRPM | HEART RATE: 74 BPM

## 2022-01-01 VITALS — WEIGHT: 280 LBS | BODY MASS INDEX: 37.93 KG/M2 | HEIGHT: 72 IN

## 2022-01-01 VITALS
HEIGHT: 74 IN | TEMPERATURE: 97.3 F | WEIGHT: 276.9 LBS | RESPIRATION RATE: 18 BRPM | OXYGEN SATURATION: 96 % | BODY MASS INDEX: 35.54 KG/M2 | SYSTOLIC BLOOD PRESSURE: 105 MMHG | DIASTOLIC BLOOD PRESSURE: 65 MMHG | HEART RATE: 70 BPM

## 2022-01-01 VITALS
SYSTOLIC BLOOD PRESSURE: 104 MMHG | WEIGHT: 280.43 LBS | RESPIRATION RATE: 18 BRPM | OXYGEN SATURATION: 97 % | TEMPERATURE: 97.6 F | HEART RATE: 71 BPM | HEIGHT: 72 IN | BODY MASS INDEX: 37.98 KG/M2 | DIASTOLIC BLOOD PRESSURE: 59 MMHG

## 2022-01-01 VITALS
BODY MASS INDEX: 37.92 KG/M2 | WEIGHT: 279.98 LBS | SYSTOLIC BLOOD PRESSURE: 118 MMHG | RESPIRATION RATE: 13 BRPM | DIASTOLIC BLOOD PRESSURE: 76 MMHG | TEMPERATURE: 98.6 F | HEIGHT: 72 IN | HEART RATE: 72 BPM | OXYGEN SATURATION: 92 %

## 2022-01-01 VITALS
DIASTOLIC BLOOD PRESSURE: 75 MMHG | RESPIRATION RATE: 20 BRPM | WEIGHT: 279.1 LBS | OXYGEN SATURATION: 98 % | BODY MASS INDEX: 37.8 KG/M2 | HEIGHT: 72 IN | SYSTOLIC BLOOD PRESSURE: 130 MMHG | HEART RATE: 93 BPM | TEMPERATURE: 97.9 F

## 2022-01-01 VITALS
WEIGHT: 275.35 LBS | OXYGEN SATURATION: 96 % | RESPIRATION RATE: 16 BRPM | SYSTOLIC BLOOD PRESSURE: 144 MMHG | HEART RATE: 69 BPM | BODY MASS INDEX: 37.3 KG/M2 | DIASTOLIC BLOOD PRESSURE: 68 MMHG | HEIGHT: 72 IN | TEMPERATURE: 98.1 F

## 2022-01-01 VITALS
WEIGHT: 259 LBS | HEIGHT: 72 IN | SYSTOLIC BLOOD PRESSURE: 106 MMHG | HEART RATE: 72 BPM | BODY MASS INDEX: 35.08 KG/M2 | DIASTOLIC BLOOD PRESSURE: 50 MMHG | TEMPERATURE: 97.4 F | RESPIRATION RATE: 17 BRPM | OXYGEN SATURATION: 96 %

## 2022-01-01 VITALS
TEMPERATURE: 97.2 F | SYSTOLIC BLOOD PRESSURE: 115 MMHG | WEIGHT: 283.07 LBS | BODY MASS INDEX: 38.34 KG/M2 | DIASTOLIC BLOOD PRESSURE: 65 MMHG | HEART RATE: 70 BPM | OXYGEN SATURATION: 98 % | RESPIRATION RATE: 25 BRPM | HEIGHT: 72 IN

## 2022-01-01 VITALS
RESPIRATION RATE: 5 BRPM | SYSTOLIC BLOOD PRESSURE: 120 MMHG | DIASTOLIC BLOOD PRESSURE: 56 MMHG | OXYGEN SATURATION: 96 % | HEART RATE: 70 BPM

## 2022-01-01 VITALS
HEART RATE: 80 BPM | RESPIRATION RATE: 16 BRPM | OXYGEN SATURATION: 100 % | SYSTOLIC BLOOD PRESSURE: 123 MMHG | DIASTOLIC BLOOD PRESSURE: 72 MMHG

## 2022-01-01 DIAGNOSIS — N17.9 AKI (ACUTE KIDNEY INJURY): ICD-10-CM

## 2022-01-01 DIAGNOSIS — M47.27 OSTEOARTHRITIS OF SPINE WITH RADICULOPATHY, LUMBOSACRAL REGION: ICD-10-CM

## 2022-01-01 DIAGNOSIS — I20.0 UNSTABLE ANGINA: Primary | ICD-10-CM

## 2022-01-01 DIAGNOSIS — Z47.89 ORTHOPEDIC AFTERCARE: Primary | ICD-10-CM

## 2022-01-01 DIAGNOSIS — E86.1 HYPOTENSION DUE TO HYPOVOLEMIA: ICD-10-CM

## 2022-01-01 DIAGNOSIS — I48.21 PERMANENT ATRIAL FIBRILLATION: ICD-10-CM

## 2022-01-01 DIAGNOSIS — J44.1 COPD WITH EXACERBATION: ICD-10-CM

## 2022-01-01 DIAGNOSIS — G89.29 CHRONIC MIDLINE LOW BACK PAIN WITHOUT SCIATICA: Primary | ICD-10-CM

## 2022-01-01 DIAGNOSIS — M70.22 OLECRANON BURSITIS, LEFT ELBOW: ICD-10-CM

## 2022-01-01 DIAGNOSIS — I44.2 AV BLOCK, COMPLETE: ICD-10-CM

## 2022-01-01 DIAGNOSIS — J96.11 CHRONIC RESPIRATORY FAILURE WITH HYPOXIA: Primary | Chronic | ICD-10-CM

## 2022-01-01 DIAGNOSIS — R53.1 GENERAL WEAKNESS: ICD-10-CM

## 2022-01-01 DIAGNOSIS — N39.0 URINARY TRACT INFECTION WITHOUT HEMATURIA, SITE UNSPECIFIED: ICD-10-CM

## 2022-01-01 DIAGNOSIS — M54.16 LUMBAR RADICULOPATHY: ICD-10-CM

## 2022-01-01 DIAGNOSIS — R10.84 GENERALIZED ABDOMINAL PAIN: ICD-10-CM

## 2022-01-01 DIAGNOSIS — Z95.5 PRESENCE OF CORONARY ANGIOPLASTY IMPLANT AND GRAFT: ICD-10-CM

## 2022-01-01 DIAGNOSIS — J18.9 PNEUMONIA DUE TO INFECTIOUS ORGANISM, UNSPECIFIED LATERALITY, UNSPECIFIED PART OF LUNG: Primary | ICD-10-CM

## 2022-01-01 DIAGNOSIS — M70.22 OLECRANON BURSITIS OF LEFT ELBOW: Primary | ICD-10-CM

## 2022-01-01 DIAGNOSIS — D62 ACUTE BLOOD LOSS ANEMIA: ICD-10-CM

## 2022-01-01 DIAGNOSIS — M25.522 ELBOW PAIN, LEFT: ICD-10-CM

## 2022-01-01 DIAGNOSIS — K66.8 PNEUMOPERITONEUM: ICD-10-CM

## 2022-01-01 DIAGNOSIS — M51.36 DDD (DEGENERATIVE DISC DISEASE), LUMBAR: ICD-10-CM

## 2022-01-01 DIAGNOSIS — R06.00 DYSPNEA, UNSPECIFIED TYPE: ICD-10-CM

## 2022-01-01 DIAGNOSIS — M51.36 DEGENERATION OF LUMBAR INTERVERTEBRAL DISC: ICD-10-CM

## 2022-01-01 DIAGNOSIS — J18.9 PNEUMONIA OF RIGHT LUNG DUE TO INFECTIOUS ORGANISM, UNSPECIFIED PART OF LUNG: Primary | ICD-10-CM

## 2022-01-01 DIAGNOSIS — T14.8XXA WOUND INFECTION: ICD-10-CM

## 2022-01-01 DIAGNOSIS — E78.5 DYSLIPIDEMIA: ICD-10-CM

## 2022-01-01 DIAGNOSIS — G89.29 CHRONIC MIDLINE LOW BACK PAIN WITHOUT SCIATICA: ICD-10-CM

## 2022-01-01 DIAGNOSIS — I50.21 ACUTE SYSTOLIC HEART FAILURE: ICD-10-CM

## 2022-01-01 DIAGNOSIS — M47.817 SPONDYLOSIS OF LUMBOSACRAL REGION WITHOUT MYELOPATHY OR RADICULOPATHY: ICD-10-CM

## 2022-01-01 DIAGNOSIS — K92.2 UPPER GI BLEED: ICD-10-CM

## 2022-01-01 DIAGNOSIS — M54.50 CHRONIC MIDLINE LOW BACK PAIN WITHOUT SCIATICA: ICD-10-CM

## 2022-01-01 DIAGNOSIS — I95.89 HYPOTENSION DUE TO HYPOVOLEMIA: ICD-10-CM

## 2022-01-01 DIAGNOSIS — Z95.0 PRESENCE OF CARDIAC PACEMAKER: Primary | ICD-10-CM

## 2022-01-01 DIAGNOSIS — D50.0 IRON DEFICIENCY ANEMIA DUE TO CHRONIC BLOOD LOSS: ICD-10-CM

## 2022-01-01 DIAGNOSIS — Z95.820 STATUS POST ANGIOPLASTY WITH STENT: ICD-10-CM

## 2022-01-01 DIAGNOSIS — K43.9 VENTRAL HERNIA WITHOUT OBSTRUCTION OR GANGRENE: ICD-10-CM

## 2022-01-01 DIAGNOSIS — I10 ESSENTIAL HYPERTENSION: ICD-10-CM

## 2022-01-01 DIAGNOSIS — K56.609 SMALL BOWEL OBSTRUCTION: Primary | ICD-10-CM

## 2022-01-01 DIAGNOSIS — R73.9 HYPERGLYCEMIA: ICD-10-CM

## 2022-01-01 DIAGNOSIS — Z79.899 HIGH RISK MEDICATION USE: Primary | ICD-10-CM

## 2022-01-01 DIAGNOSIS — S80.219A ABRASION OF KNEE, UNSPECIFIED LATERALITY, INITIAL ENCOUNTER: ICD-10-CM

## 2022-01-01 DIAGNOSIS — I12.9 BENIGN HYPERTENSION WITH CHRONIC KIDNEY DISEASE: ICD-10-CM

## 2022-01-01 DIAGNOSIS — N17.9 ACUTE KIDNEY INJURY: ICD-10-CM

## 2022-01-01 DIAGNOSIS — R10.9 ABDOMINAL PAIN, UNSPECIFIED ABDOMINAL LOCATION: ICD-10-CM

## 2022-01-01 DIAGNOSIS — R06.00 DYSPNEA, UNSPECIFIED TYPE: Primary | ICD-10-CM

## 2022-01-01 DIAGNOSIS — N18.31 STAGE 3A CHRONIC KIDNEY DISEASE: Chronic | ICD-10-CM

## 2022-01-01 DIAGNOSIS — M54.50 CHRONIC MIDLINE LOW BACK PAIN WITHOUT SCIATICA: Primary | ICD-10-CM

## 2022-01-01 DIAGNOSIS — J44.1 COPD EXACERBATION: ICD-10-CM

## 2022-01-01 DIAGNOSIS — K56.609 SBO (SMALL BOWEL OBSTRUCTION): Primary | ICD-10-CM

## 2022-01-01 DIAGNOSIS — L08.9 WOUND INFECTION: ICD-10-CM

## 2022-01-01 DIAGNOSIS — R53.1 WEAKNESS: ICD-10-CM

## 2022-01-01 DIAGNOSIS — M70.22 OLECRANON BURSITIS OF LEFT ELBOW: ICD-10-CM

## 2022-01-01 DIAGNOSIS — E87.5 HYPERKALEMIA: ICD-10-CM

## 2022-01-01 LAB
ABO GROUP BLD: NORMAL
ALBUMIN SERPL-MCNC: 2.7 G/DL (ref 3.5–5.2)
ALBUMIN SERPL-MCNC: 2.8 G/DL (ref 3.5–5.2)
ALBUMIN SERPL-MCNC: 2.9 G/DL (ref 3.5–5.2)
ALBUMIN SERPL-MCNC: 3 G/DL (ref 3.5–5.2)
ALBUMIN SERPL-MCNC: 3 G/DL (ref 3.5–5.2)
ALBUMIN SERPL-MCNC: 3.1 G/DL (ref 3.5–5.2)
ALBUMIN SERPL-MCNC: 3.2 G/DL (ref 3.5–5.2)
ALBUMIN SERPL-MCNC: 3.4 G/DL (ref 3.5–5.2)
ALBUMIN SERPL-MCNC: 3.5 G/DL (ref 3.5–5.2)
ALBUMIN SERPL-MCNC: 3.6 G/DL (ref 3.5–5.2)
ALBUMIN SERPL-MCNC: 4 G/DL (ref 3.5–5.2)
ALBUMIN/GLOB SERPL: 0.9 G/DL
ALBUMIN/GLOB SERPL: 1 G/DL
ALBUMIN/GLOB SERPL: 1.1 G/DL
ALBUMIN/GLOB SERPL: 1.2 G/DL
ALBUMIN/GLOB SERPL: 1.3 G/DL
ALP SERPL-CCNC: 103 U/L (ref 39–117)
ALP SERPL-CCNC: 107 U/L (ref 39–117)
ALP SERPL-CCNC: 109 U/L (ref 39–117)
ALP SERPL-CCNC: 119 U/L (ref 39–117)
ALP SERPL-CCNC: 121 U/L (ref 39–117)
ALP SERPL-CCNC: 123 U/L (ref 39–117)
ALP SERPL-CCNC: 125 U/L (ref 39–117)
ALP SERPL-CCNC: 133 U/L (ref 39–117)
ALP SERPL-CCNC: 146 U/L (ref 39–117)
ALP SERPL-CCNC: 154 U/L (ref 39–117)
ALP SERPL-CCNC: 161 U/L (ref 39–117)
ALP SERPL-CCNC: 80 U/L (ref 39–117)
ALP SERPL-CCNC: 87 U/L (ref 39–117)
ALP SERPL-CCNC: 88 U/L (ref 39–117)
ALP SERPL-CCNC: 96 U/L (ref 39–117)
ALP SERPL-CCNC: 99 U/L (ref 39–117)
ALT SERPL W P-5'-P-CCNC: 11 U/L (ref 1–41)
ALT SERPL W P-5'-P-CCNC: 5 U/L (ref 1–41)
ALT SERPL W P-5'-P-CCNC: 6 U/L (ref 1–41)
ALT SERPL W P-5'-P-CCNC: 7 U/L (ref 1–41)
ALT SERPL W P-5'-P-CCNC: 8 U/L (ref 1–41)
ALT SERPL W P-5'-P-CCNC: 8 U/L (ref 1–41)
ALT SERPL W P-5'-P-CCNC: <5 U/L (ref 1–41)
ANION GAP SERPL CALCULATED.3IONS-SCNC: 10 MMOL/L (ref 5–15)
ANION GAP SERPL CALCULATED.3IONS-SCNC: 11 MMOL/L (ref 5–15)
ANION GAP SERPL CALCULATED.3IONS-SCNC: 12 MMOL/L (ref 5–15)
ANION GAP SERPL CALCULATED.3IONS-SCNC: 13 MMOL/L (ref 5–15)
ANION GAP SERPL CALCULATED.3IONS-SCNC: 14 MMOL/L (ref 5–15)
ANION GAP SERPL CALCULATED.3IONS-SCNC: 14 MMOL/L (ref 5–15)
ANION GAP SERPL CALCULATED.3IONS-SCNC: 15 MMOL/L (ref 5–15)
ANION GAP SERPL CALCULATED.3IONS-SCNC: 15 MMOL/L (ref 5–15)
ANION GAP SERPL CALCULATED.3IONS-SCNC: 7 MMOL/L (ref 5–15)
ANION GAP SERPL CALCULATED.3IONS-SCNC: 8 MMOL/L (ref 5–15)
ANION GAP SERPL CALCULATED.3IONS-SCNC: 8 MMOL/L (ref 5–15)
ANION GAP SERPL CALCULATED.3IONS-SCNC: 8.2 MMOL/L (ref 5–15)
ANION GAP SERPL CALCULATED.3IONS-SCNC: 9 MMOL/L (ref 5–15)
ANISOCYTOSIS BLD QL: ABNORMAL
APTT PPP: 26.6 SECONDS (ref 61–76.5)
ARTERIAL PATENCY WRIST A: ABNORMAL
ARTERIAL PATENCY WRIST A: POSITIVE
ARTERIAL PATENCY WRIST A: POSITIVE
AST SERPL-CCNC: 10 U/L (ref 1–40)
AST SERPL-CCNC: 11 U/L (ref 1–40)
AST SERPL-CCNC: 12 U/L (ref 1–40)
AST SERPL-CCNC: 12 U/L (ref 1–40)
AST SERPL-CCNC: 13 U/L (ref 1–40)
AST SERPL-CCNC: 14 U/L (ref 1–40)
AST SERPL-CCNC: 14 U/L (ref 1–40)
AST SERPL-CCNC: 15 U/L (ref 1–40)
AST SERPL-CCNC: 16 U/L (ref 1–40)
AST SERPL-CCNC: 16 U/L (ref 1–40)
AST SERPL-CCNC: 17 U/L (ref 1–40)
AST SERPL-CCNC: 18 U/L (ref 1–40)
AST SERPL-CCNC: 20 U/L (ref 1–40)
AST SERPL-CCNC: 26 U/L (ref 1–40)
AST SERPL-CCNC: 9 U/L (ref 1–40)
AST SERPL-CCNC: 9 U/L (ref 1–40)
ATMOSPHERIC PRESS: ABNORMAL MM[HG]
B PARAPERT DNA SPEC QL NAA+PROBE: NOT DETECTED
B PERT DNA SPEC QL NAA+PROBE: NOT DETECTED
BACTERIA BLD CULT: ABNORMAL
BACTERIA FLD CULT: NORMAL
BACTERIA FLD CULT: NORMAL
BACTERIA SPEC AEROBE CULT: ABNORMAL
BACTERIA SPEC AEROBE CULT: NORMAL
BACTERIA SPEC ANAEROBE CULT: NORMAL
BACTERIA UR QL AUTO: ABNORMAL /HPF
BASE EXCESS BLDA CALC-SCNC: -6.2 MMOL/L (ref 0–3)
BASE EXCESS BLDA CALC-SCNC: 0.3 MMOL/L (ref 0–3)
BASE EXCESS BLDA CALC-SCNC: 2.7 MMOL/L (ref 0–3)
BASOPHILS # BLD AUTO: 0 10*3/MM3 (ref 0–0.2)
BASOPHILS # BLD AUTO: 0.1 10*3/MM3 (ref 0–0.2)
BASOPHILS # BLD AUTO: 0.2 10*3/MM3 (ref 0–0.2)
BASOPHILS # BLD AUTO: 0.2 10*3/MM3 (ref 0–0.2)
BASOPHILS # BLD MANUAL: 0.1 10*3/MM3 (ref 0–0.2)
BASOPHILS # BLD MANUAL: 0.17 10*3/MM3 (ref 0–0.2)
BASOPHILS NFR BLD AUTO: 0 % (ref 0–1.5)
BASOPHILS NFR BLD AUTO: 0.1 % (ref 0–1.5)
BASOPHILS NFR BLD AUTO: 0.1 % (ref 0–1.5)
BASOPHILS NFR BLD AUTO: 0.2 % (ref 0–1.5)
BASOPHILS NFR BLD AUTO: 0.2 % (ref 0–1.5)
BASOPHILS NFR BLD AUTO: 0.3 % (ref 0–1.5)
BASOPHILS NFR BLD AUTO: 0.4 % (ref 0–1.5)
BASOPHILS NFR BLD AUTO: 0.4 % (ref 0–1.5)
BASOPHILS NFR BLD AUTO: 0.5 % (ref 0–1.5)
BASOPHILS NFR BLD AUTO: 0.5 % (ref 0–1.5)
BASOPHILS NFR BLD AUTO: 0.6 % (ref 0–1.5)
BASOPHILS NFR BLD AUTO: 0.7 % (ref 0–1.5)
BASOPHILS NFR BLD AUTO: 0.8 % (ref 0–1.5)
BASOPHILS NFR BLD AUTO: 0.9 % (ref 0–1.5)
BASOPHILS NFR BLD AUTO: 1 % (ref 0–1.5)
BASOPHILS NFR BLD AUTO: 1.1 % (ref 0–1.5)
BASOPHILS NFR BLD AUTO: 1.2 % (ref 0–1.5)
BASOPHILS NFR BLD AUTO: 1.2 % (ref 0–1.5)
BASOPHILS NFR BLD AUTO: 1.3 % (ref 0–1.5)
BASOPHILS NFR BLD AUTO: 1.3 % (ref 0–1.5)
BASOPHILS NFR BLD AUTO: 1.4 % (ref 0–1.5)
BASOPHILS NFR BLD AUTO: 1.5 % (ref 0–1.5)
BASOPHILS NFR BLD AUTO: 1.6 % (ref 0–1.5)
BASOPHILS NFR BLD AUTO: 1.8 % (ref 0–1.5)
BASOPHILS NFR BLD AUTO: 2 % (ref 0–1.5)
BASOPHILS NFR BLD MANUAL: 1 % (ref 0–1.5)
BASOPHILS NFR BLD MANUAL: 2 % (ref 0–1.5)
BDY SITE: ABNORMAL
BH BB BLOOD EXPIRATION DATE: NORMAL
BH BB BLOOD TYPE BARCODE: 5100
BH BB BLOOD TYPE BARCODE: 5100
BH BB BLOOD TYPE BARCODE: 9500
BH BB DISPENSE STATUS: NORMAL
BH BB PRODUCT CODE: NORMAL
BH BB UNIT NUMBER: NORMAL
BH CV ECHO MEAS - ACS: 1.9 CM
BH CV ECHO MEAS - ACS: 2.15 CM
BH CV ECHO MEAS - AO MAX PG: 13.9 MMHG
BH CV ECHO MEAS - AO MAX PG: 4.9 MMHG
BH CV ECHO MEAS - AO MEAN PG: 2.9 MMHG
BH CV ECHO MEAS - AO MEAN PG: 7.5 MMHG
BH CV ECHO MEAS - AO ROOT DIAM: 3.7 CM
BH CV ECHO MEAS - AO ROOT DIAM: 3.9 CM
BH CV ECHO MEAS - AO V2 MAX: 110.9 CM/SEC
BH CV ECHO MEAS - AO V2 MAX: 186.6 CM/SEC
BH CV ECHO MEAS - AO V2 VTI: 24.6 CM
BH CV ECHO MEAS - AO V2 VTI: 30.7 CM
BH CV ECHO MEAS - AVA(I,D): 2.8 CM2
BH CV ECHO MEAS - AVA(I,D): 3.8 CM2
BH CV ECHO MEAS - EDV(CUBED): 180.5 ML
BH CV ECHO MEAS - EDV(CUBED): 222.7 ML
BH CV ECHO MEAS - EDV(MOD-SP4): 131.1 ML
BH CV ECHO MEAS - EF(MOD-SP4): 71 %
BH CV ECHO MEAS - ESV(CUBED): 36.6 ML
BH CV ECHO MEAS - ESV(CUBED): 41 ML
BH CV ECHO MEAS - ESV(MOD-SP4): 38 ML
BH CV ECHO MEAS - FS: 41.2 %
BH CV ECHO MEAS - FS: 43.1 %
BH CV ECHO MEAS - IVS/LVPW: 0.99 CM
BH CV ECHO MEAS - IVS/LVPW: 1.07 CM
BH CV ECHO MEAS - IVSD: 1.05 CM
BH CV ECHO MEAS - IVSD: 1.57 CM
BH CV ECHO MEAS - LA DIMENSION(2D): 5.4 CM
BH CV ECHO MEAS - LA DIMENSION: 5.6 CM
BH CV ECHO MEAS - LV MASS(C)D: 256.8 GRAMS
BH CV ECHO MEAS - LV MASS(C)D: 417.1 GRAMS
BH CV ECHO MEAS - LV MAX PG: 3.3 MMHG
BH CV ECHO MEAS - LV MAX PG: 8.4 MMHG
BH CV ECHO MEAS - LV MEAN PG: 1.7 MMHG
BH CV ECHO MEAS - LV MEAN PG: 4.6 MMHG
BH CV ECHO MEAS - LV V1 MAX: 145.2 CM/SEC
BH CV ECHO MEAS - LV V1 MAX: 90.5 CM/SEC
BH CV ECHO MEAS - LV V1 VTI: 18.5 CM
BH CV ECHO MEAS - LV V1 VTI: 27.9 CM
BH CV ECHO MEAS - LVIDD: 5.7 CM
BH CV ECHO MEAS - LVIDD: 6.1 CM
BH CV ECHO MEAS - LVIDS: 3.3 CM
BH CV ECHO MEAS - LVIDS: 3.4 CM
BH CV ECHO MEAS - LVOT AREA: 3.7 CM2
BH CV ECHO MEAS - LVOT AREA: 4.2 CM2
BH CV ECHO MEAS - LVOT DIAM: 2.17 CM
BH CV ECHO MEAS - LVOT DIAM: 2.31 CM
BH CV ECHO MEAS - LVPWD: 0.98 CM
BH CV ECHO MEAS - LVPWD: 1.58 CM
BH CV ECHO MEAS - MR MAX PG: 86.7 MMHG
BH CV ECHO MEAS - MR MAX VEL: 465.7 CM/SEC
BH CV ECHO MEAS - MV DEC TIME: 0.17 MSEC
BH CV ECHO MEAS - MV E MAX VEL: 117.7 CM/SEC
BH CV ECHO MEAS - MV E MAX VEL: 174.5 CM/SEC
BH CV ECHO MEAS - MV MAX PG: 16.5 MMHG
BH CV ECHO MEAS - MV MAX PG: 7.7 MMHG
BH CV ECHO MEAS - MV MEAN PG: 2.4 MMHG
BH CV ECHO MEAS - MV MEAN PG: 6.4 MMHG
BH CV ECHO MEAS - MV V2 VTI: 24.4 CM
BH CV ECHO MEAS - MV V2 VTI: 45 CM
BH CV ECHO MEAS - MVA(VTI): 2.6 CM2
BH CV ECHO MEAS - MVA(VTI): 2.8 CM2
BH CV ECHO MEAS - PA V2 MAX: 100.8 CM/SEC
BH CV ECHO MEAS - PA V2 MAX: 144.3 CM/SEC
BH CV ECHO MEAS - RAP SYSTOLE: 3 MMHG
BH CV ECHO MEAS - RAP SYSTOLE: 8 MMHG
BH CV ECHO MEAS - RV MAX PG: 0.96 MMHG
BH CV ECHO MEAS - RV MAX PG: 5.4 MMHG
BH CV ECHO MEAS - RV V1 MAX: 116.3 CM/SEC
BH CV ECHO MEAS - RV V1 MAX: 49 CM/SEC
BH CV ECHO MEAS - RV V1 VTI: 17.5 CM
BH CV ECHO MEAS - RV V1 VTI: 7.8 CM
BH CV ECHO MEAS - RVDD: 2.9 CM
BH CV ECHO MEAS - RVDD: 2.9 CM
BH CV ECHO MEAS - RVSP: 45.1 MMHG
BH CV ECHO MEAS - RVSP: 47.7 MMHG
BH CV ECHO MEAS - SV(LVOT): 116.9 ML
BH CV ECHO MEAS - SV(LVOT): 68.5 ML
BH CV ECHO MEAS - SV(MOD-SP4): 93.1 ML
BH CV ECHO MEAS - TR MAX PG: 37.1 MMHG
BH CV ECHO MEAS - TR MAX PG: 44.7 MMHG
BH CV ECHO MEAS - TR MAX VEL: 304.1 CM/SEC
BH CV ECHO MEAS - TR MAX VEL: 334.3 CM/SEC
BH CV NUCLEAR PRIOR STUDY: 1
BH CV REST NUCLEAR ISOTOPE DOSE: 11 MCI
BH CV STRESS BP STAGE 1: NORMAL
BH CV STRESS BP STAGE 2: NORMAL
BH CV STRESS BP STAGE 3: NORMAL
BH CV STRESS BP STAGE 4: NORMAL
BH CV STRESS COMMENTS STAGE 1: NORMAL
BH CV STRESS DOSE REGADENOSON STAGE 1: 0.4
BH CV STRESS DURATION MIN STAGE 1: 1
BH CV STRESS DURATION MIN STAGE 2: 1
BH CV STRESS DURATION MIN STAGE 3: 1
BH CV STRESS DURATION MIN STAGE 4: 1
BH CV STRESS DURATION SEC STAGE 1: 10
BH CV STRESS DURATION SEC STAGE 2: 0
BH CV STRESS HR STAGE 1: 72
BH CV STRESS HR STAGE 2: 71
BH CV STRESS HR STAGE 3: 70
BH CV STRESS HR STAGE 4: 70
BH CV STRESS NUCLEAR ISOTOPE DOSE: 33 MCI
BH CV STRESS PROTOCOL 1: NORMAL
BH CV STRESS RECOVERY BP: NORMAL MMHG
BH CV STRESS RECOVERY HR: 70 BPM
BH CV STRESS STAGE 1: 1
BH CV STRESS STAGE 2: 2
BH CV STRESS STAGE 3: 3
BH CV STRESS STAGE 4: 4
BILIRUB SERPL-MCNC: 0.2 MG/DL (ref 0–1.2)
BILIRUB SERPL-MCNC: 0.3 MG/DL (ref 0–1.2)
BILIRUB SERPL-MCNC: 0.4 MG/DL (ref 0–1.2)
BILIRUB SERPL-MCNC: 0.5 MG/DL (ref 0–1.2)
BILIRUB SERPL-MCNC: 0.6 MG/DL (ref 0–1.2)
BILIRUB SERPL-MCNC: 0.6 MG/DL (ref 0–1.2)
BILIRUB SERPL-MCNC: 0.8 MG/DL (ref 0–1.2)
BILIRUB SERPL-MCNC: 0.9 MG/DL (ref 0–1.2)
BILIRUB SERPL-MCNC: 1 MG/DL (ref 0–1.2)
BILIRUB UR QL STRIP: ABNORMAL
BILIRUB UR QL STRIP: NEGATIVE
BLD GP AB SCN SERPL QL: NEGATIVE
BOTTLE TYPE: ABNORMAL
BUN SERPL-MCNC: 11 MG/DL (ref 8–23)
BUN SERPL-MCNC: 15 MG/DL (ref 8–23)
BUN SERPL-MCNC: 17 MG/DL (ref 8–23)
BUN SERPL-MCNC: 18 MG/DL (ref 8–23)
BUN SERPL-MCNC: 18 MG/DL (ref 8–23)
BUN SERPL-MCNC: 20 MG/DL (ref 8–23)
BUN SERPL-MCNC: 21 MG/DL (ref 8–23)
BUN SERPL-MCNC: 22 MG/DL (ref 8–23)
BUN SERPL-MCNC: 23 MG/DL (ref 8–23)
BUN SERPL-MCNC: 24 MG/DL (ref 8–23)
BUN SERPL-MCNC: 24 MG/DL (ref 8–23)
BUN SERPL-MCNC: 25 MG/DL (ref 8–23)
BUN SERPL-MCNC: 25 MG/DL (ref 8–23)
BUN SERPL-MCNC: 26 MG/DL (ref 8–23)
BUN SERPL-MCNC: 26 MG/DL (ref 8–23)
BUN SERPL-MCNC: 27 MG/DL (ref 8–23)
BUN SERPL-MCNC: 28 MG/DL (ref 8–23)
BUN SERPL-MCNC: 29 MG/DL (ref 8–23)
BUN SERPL-MCNC: 30 MG/DL (ref 8–23)
BUN SERPL-MCNC: 31 MG/DL (ref 8–23)
BUN SERPL-MCNC: 31 MG/DL (ref 8–23)
BUN SERPL-MCNC: 33 MG/DL (ref 8–23)
BUN SERPL-MCNC: 34 MG/DL (ref 8–23)
BUN SERPL-MCNC: 37 MG/DL (ref 8–23)
BUN SERPL-MCNC: 37 MG/DL (ref 8–23)
BUN SERPL-MCNC: 38 MG/DL (ref 8–23)
BUN SERPL-MCNC: 39 MG/DL (ref 8–23)
BUN SERPL-MCNC: 41 MG/DL (ref 8–23)
BUN SERPL-MCNC: 42 MG/DL (ref 8–23)
BUN SERPL-MCNC: 44 MG/DL (ref 8–23)
BUN SERPL-MCNC: 49 MG/DL (ref 8–23)
BUN SERPL-MCNC: 55 MG/DL (ref 8–23)
BUN SERPL-MCNC: 56 MG/DL (ref 8–23)
BUN SERPL-MCNC: 77 MG/DL (ref 8–23)
BUN/CREAT SERPL: 11.8 (ref 7–25)
BUN/CREAT SERPL: 13.3 (ref 7–25)
BUN/CREAT SERPL: 15.3 (ref 7–25)
BUN/CREAT SERPL: 16 (ref 7–25)
BUN/CREAT SERPL: 16.5 (ref 7–25)
BUN/CREAT SERPL: 16.7 (ref 7–25)
BUN/CREAT SERPL: 16.9 (ref 7–25)
BUN/CREAT SERPL: 17.2 (ref 7–25)
BUN/CREAT SERPL: 17.5 (ref 7–25)
BUN/CREAT SERPL: 17.6 (ref 7–25)
BUN/CREAT SERPL: 18.2 (ref 7–25)
BUN/CREAT SERPL: 18.4 (ref 7–25)
BUN/CREAT SERPL: 18.5 (ref 7–25)
BUN/CREAT SERPL: 18.6 (ref 7–25)
BUN/CREAT SERPL: 18.8 (ref 7–25)
BUN/CREAT SERPL: 18.9 (ref 7–25)
BUN/CREAT SERPL: 19 (ref 7–25)
BUN/CREAT SERPL: 19 (ref 7–25)
BUN/CREAT SERPL: 19.4 (ref 7–25)
BUN/CREAT SERPL: 19.5 (ref 7–25)
BUN/CREAT SERPL: 19.6 (ref 7–25)
BUN/CREAT SERPL: 19.7 (ref 7–25)
BUN/CREAT SERPL: 19.8 (ref 7–25)
BUN/CREAT SERPL: 20 (ref 7–25)
BUN/CREAT SERPL: 20.4 (ref 7–25)
BUN/CREAT SERPL: 20.5 (ref 7–25)
BUN/CREAT SERPL: 22 (ref 7–25)
BUN/CREAT SERPL: 22.3 (ref 7–25)
BUN/CREAT SERPL: 22.3 (ref 7–25)
BUN/CREAT SERPL: 22.8 (ref 7–25)
BUN/CREAT SERPL: 23 (ref 7–25)
BUN/CREAT SERPL: 23.1 (ref 7–25)
BUN/CREAT SERPL: 23.4 (ref 7–25)
BUN/CREAT SERPL: 23.7 (ref 7–25)
BUN/CREAT SERPL: 23.7 (ref 7–25)
BUN/CREAT SERPL: 25 (ref 7–25)
BUN/CREAT SERPL: 25 (ref 7–25)
BUN/CREAT SERPL: 25.4 (ref 7–25)
BUN/CREAT SERPL: 26.3 (ref 7–25)
BUN/CREAT SERPL: 26.5 (ref 7–25)
BUN/CREAT SERPL: 26.5 (ref 7–25)
BUN/CREAT SERPL: 28.5 (ref 7–25)
BUN/CREAT SERPL: 29.5 (ref 7–25)
BUN/CREAT SERPL: 29.6 (ref 7–25)
BUN/CREAT SERPL: 30.4 (ref 7–25)
BUN/CREAT SERPL: 31.7 (ref 7–25)
BUN/CREAT SERPL: 33.1 (ref 7–25)
BUN/CREAT SERPL: 33.5 (ref 7–25)
BUN/CREAT SERPL: 34.4 (ref 7–25)
BUN/CREAT SERPL: 39 (ref 7–25)
BUN/CREAT SERPL: 39.2 (ref 7–25)
BUN/CREAT SERPL: 43.5 (ref 7–25)
C PNEUM DNA NPH QL NAA+NON-PROBE: NOT DETECTED
CA-I SERPL ISE-MCNC: 1.27 MMOL/L (ref 1.2–1.3)
CALCIUM SPEC-SCNC: 10.2 MG/DL (ref 8.6–10.5)
CALCIUM SPEC-SCNC: 7.7 MG/DL (ref 8.6–10.5)
CALCIUM SPEC-SCNC: 7.8 MG/DL (ref 8.6–10.5)
CALCIUM SPEC-SCNC: 7.8 MG/DL (ref 8.6–10.5)
CALCIUM SPEC-SCNC: 8 MG/DL (ref 8.6–10.5)
CALCIUM SPEC-SCNC: 8.1 MG/DL (ref 8.6–10.5)
CALCIUM SPEC-SCNC: 8.2 MG/DL (ref 8.6–10.5)
CALCIUM SPEC-SCNC: 8.3 MG/DL (ref 8.6–10.5)
CALCIUM SPEC-SCNC: 8.4 MG/DL (ref 8.6–10.5)
CALCIUM SPEC-SCNC: 8.4 MG/DL (ref 8.6–10.5)
CALCIUM SPEC-SCNC: 8.5 MG/DL (ref 8.6–10.5)
CALCIUM SPEC-SCNC: 8.6 MG/DL (ref 8.6–10.5)
CALCIUM SPEC-SCNC: 8.6 MG/DL (ref 8.6–10.5)
CALCIUM SPEC-SCNC: 8.7 MG/DL (ref 8.6–10.5)
CALCIUM SPEC-SCNC: 8.8 MG/DL (ref 8.6–10.5)
CALCIUM SPEC-SCNC: 8.9 MG/DL (ref 8.6–10.5)
CALCIUM SPEC-SCNC: 8.9 MG/DL (ref 8.6–10.5)
CALCIUM SPEC-SCNC: 9 MG/DL (ref 8.6–10.5)
CALCIUM SPEC-SCNC: 9.1 MG/DL (ref 8.6–10.5)
CALCIUM SPEC-SCNC: 9.2 MG/DL (ref 8.6–10.5)
CALCIUM SPEC-SCNC: 9.3 MG/DL (ref 8.6–10.5)
CHLORIDE SERPL-SCNC: 100 MMOL/L (ref 98–107)
CHLORIDE SERPL-SCNC: 101 MMOL/L (ref 98–107)
CHLORIDE SERPL-SCNC: 102 MMOL/L (ref 98–107)
CHLORIDE SERPL-SCNC: 103 MMOL/L (ref 98–107)
CHLORIDE SERPL-SCNC: 104 MMOL/L (ref 98–107)
CHLORIDE SERPL-SCNC: 93 MMOL/L (ref 98–107)
CHLORIDE SERPL-SCNC: 94 MMOL/L (ref 98–107)
CHLORIDE SERPL-SCNC: 94 MMOL/L (ref 98–107)
CHLORIDE SERPL-SCNC: 95 MMOL/L (ref 98–107)
CHLORIDE SERPL-SCNC: 96 MMOL/L (ref 98–107)
CHLORIDE SERPL-SCNC: 97 MMOL/L (ref 98–107)
CHLORIDE SERPL-SCNC: 98 MMOL/L (ref 98–107)
CHLORIDE SERPL-SCNC: 98 MMOL/L (ref 98–107)
CHLORIDE SERPL-SCNC: 99 MMOL/L (ref 98–107)
CK SERPL-CCNC: 82 U/L (ref 20–200)
CLARITY UR: ABNORMAL
CLARITY UR: ABNORMAL
CLARITY UR: CLEAR
CO2 BLDA-SCNC: 20.7 MMOL/L (ref 22–29)
CO2 BLDA-SCNC: 25.5 MMOL/L (ref 22–29)
CO2 BLDA-SCNC: 30.2 MMOL/L (ref 22–29)
CO2 SERPL-SCNC: 20 MMOL/L (ref 22–29)
CO2 SERPL-SCNC: 21 MMOL/L (ref 22–29)
CO2 SERPL-SCNC: 21 MMOL/L (ref 22–29)
CO2 SERPL-SCNC: 22 MMOL/L (ref 22–29)
CO2 SERPL-SCNC: 23 MMOL/L (ref 22–29)
CO2 SERPL-SCNC: 24 MMOL/L (ref 22–29)
CO2 SERPL-SCNC: 25 MMOL/L (ref 22–29)
CO2 SERPL-SCNC: 26 MMOL/L (ref 22–29)
CO2 SERPL-SCNC: 27 MMOL/L (ref 22–29)
CO2 SERPL-SCNC: 28 MMOL/L (ref 22–29)
CO2 SERPL-SCNC: 29 MMOL/L (ref 22–29)
CO2 SERPL-SCNC: 30 MMOL/L (ref 22–29)
CO2 SERPL-SCNC: 31.8 MMOL/L (ref 22–29)
CO2 SERPL-SCNC: 32 MMOL/L (ref 22–29)
CO2 SERPL-SCNC: 32 MMOL/L (ref 22–29)
CO2 SERPL-SCNC: 33 MMOL/L (ref 22–29)
CO2 SERPL-SCNC: 34 MMOL/L (ref 22–29)
COLOR UR: ABNORMAL
COLOR UR: YELLOW
CREAT SERPL-MCNC: 0.9 MG/DL (ref 0.76–1.27)
CREAT SERPL-MCNC: 0.92 MG/DL (ref 0.76–1.27)
CREAT SERPL-MCNC: 0.93 MG/DL (ref 0.76–1.27)
CREAT SERPL-MCNC: 0.93 MG/DL (ref 0.76–1.27)
CREAT SERPL-MCNC: 0.94 MG/DL (ref 0.76–1.27)
CREAT SERPL-MCNC: 1 MG/DL (ref 0.76–1.27)
CREAT SERPL-MCNC: 1.02 MG/DL (ref 0.76–1.27)
CREAT SERPL-MCNC: 1.03 MG/DL (ref 0.76–1.27)
CREAT SERPL-MCNC: 1.04 MG/DL (ref 0.76–1.27)
CREAT SERPL-MCNC: 1.06 MG/DL (ref 0.76–1.27)
CREAT SERPL-MCNC: 1.08 MG/DL (ref 0.76–1.27)
CREAT SERPL-MCNC: 1.11 MG/DL (ref 0.76–1.27)
CREAT SERPL-MCNC: 1.12 MG/DL (ref 0.76–1.27)
CREAT SERPL-MCNC: 1.13 MG/DL (ref 0.76–1.27)
CREAT SERPL-MCNC: 1.15 MG/DL (ref 0.76–1.27)
CREAT SERPL-MCNC: 1.16 MG/DL (ref 0.76–1.27)
CREAT SERPL-MCNC: 1.17 MG/DL (ref 0.76–1.27)
CREAT SERPL-MCNC: 1.18 MG/DL (ref 0.76–1.27)
CREAT SERPL-MCNC: 1.2 MG/DL (ref 0.76–1.27)
CREAT SERPL-MCNC: 1.21 MG/DL (ref 0.76–1.27)
CREAT SERPL-MCNC: 1.22 MG/DL (ref 0.76–1.27)
CREAT SERPL-MCNC: 1.22 MG/DL (ref 0.76–1.27)
CREAT SERPL-MCNC: 1.23 MG/DL (ref 0.76–1.27)
CREAT SERPL-MCNC: 1.23 MG/DL (ref 0.76–1.27)
CREAT SERPL-MCNC: 1.24 MG/DL (ref 0.76–1.27)
CREAT SERPL-MCNC: 1.25 MG/DL (ref 0.76–1.27)
CREAT SERPL-MCNC: 1.27 MG/DL (ref 0.76–1.27)
CREAT SERPL-MCNC: 1.27 MG/DL (ref 0.76–1.27)
CREAT SERPL-MCNC: 1.28 MG/DL (ref 0.76–1.27)
CREAT SERPL-MCNC: 1.3 MG/DL (ref 0.76–1.27)
CREAT SERPL-MCNC: 1.33 MG/DL (ref 0.76–1.27)
CREAT SERPL-MCNC: 1.34 MG/DL (ref 0.76–1.27)
CREAT SERPL-MCNC: 1.37 MG/DL (ref 0.76–1.27)
CREAT SERPL-MCNC: 1.41 MG/DL (ref 0.76–1.27)
CREAT SERPL-MCNC: 1.42 MG/DL (ref 0.76–1.27)
CREAT SERPL-MCNC: 1.44 MG/DL (ref 0.76–1.27)
CREAT SERPL-MCNC: 1.44 MG/DL (ref 0.76–1.27)
CREAT SERPL-MCNC: 1.48 MG/DL (ref 0.76–1.27)
CREAT SERPL-MCNC: 1.53 MG/DL (ref 0.76–1.27)
CREAT SERPL-MCNC: 1.56 MG/DL (ref 0.76–1.27)
CREAT SERPL-MCNC: 1.6 MG/DL (ref 0.76–1.27)
CREAT SERPL-MCNC: 1.67 MG/DL (ref 0.76–1.27)
CREAT SERPL-MCNC: 1.77 MG/DL (ref 0.76–1.27)
CROSSMATCH INTERPRETATION: NORMAL
CRP SERPL-MCNC: 0.9 MG/DL (ref 0–0.5)
CRP SERPL-MCNC: 19.51 MG/DL (ref 0–0.5)
CRP SERPL-MCNC: 2.33 MG/DL (ref 0–0.5)
D DIMER PPP FEU-MCNC: 1.04 MG/L (FEU) (ref 0–0.59)
D-LACTATE SERPL-SCNC: 1.3 MMOL/L (ref 0.5–2)
D-LACTATE SERPL-SCNC: 1.7 MMOL/L (ref 0.5–2)
DEPRECATED RDW RBC AUTO: 48.6 FL (ref 37–54)
DEPRECATED RDW RBC AUTO: 49.4 FL (ref 37–54)
DEPRECATED RDW RBC AUTO: 50.3 FL (ref 37–54)
DEPRECATED RDW RBC AUTO: 50.8 FL (ref 37–54)
DEPRECATED RDW RBC AUTO: 50.8 FL (ref 37–54)
DEPRECATED RDW RBC AUTO: 51.2 FL (ref 37–54)
DEPRECATED RDW RBC AUTO: 51.6 FL (ref 37–54)
DEPRECATED RDW RBC AUTO: 52.1 FL (ref 37–54)
DEPRECATED RDW RBC AUTO: 52.5 FL (ref 37–54)
DEPRECATED RDW RBC AUTO: 52.5 FL (ref 37–54)
DEPRECATED RDW RBC AUTO: 53.4 FL (ref 37–54)
DEPRECATED RDW RBC AUTO: 53.8 FL (ref 37–54)
DEPRECATED RDW RBC AUTO: 54.3 FL (ref 37–54)
DEPRECATED RDW RBC AUTO: 54.3 FL (ref 37–54)
DEPRECATED RDW RBC AUTO: 54.7 FL (ref 37–54)
DEPRECATED RDW RBC AUTO: 55.1 FL (ref 37–54)
DEPRECATED RDW RBC AUTO: 55.6 FL (ref 37–54)
DEPRECATED RDW RBC AUTO: 55.6 FL (ref 37–54)
DEPRECATED RDW RBC AUTO: 56 FL (ref 37–54)
DEPRECATED RDW RBC AUTO: 56.4 FL (ref 37–54)
DEPRECATED RDW RBC AUTO: 56.9 FL (ref 37–54)
DEPRECATED RDW RBC AUTO: 56.9 FL (ref 37–54)
DEPRECATED RDW RBC AUTO: 58.1 FL (ref 37–54)
DEPRECATED RDW RBC AUTO: 58.2 FL (ref 37–54)
DEPRECATED RDW RBC AUTO: 59.1 FL (ref 37–54)
DEPRECATED RDW RBC AUTO: 59.5 FL (ref 37–54)
DEPRECATED RDW RBC AUTO: 59.9 FL (ref 37–54)
DEPRECATED RDW RBC AUTO: 60.4 FL (ref 37–54)
DEPRECATED RDW RBC AUTO: 60.4 FL (ref 37–54)
DEPRECATED RDW RBC AUTO: 61.3 FL (ref 37–54)
DEPRECATED RDW RBC AUTO: 61.7 FL (ref 37–54)
DEPRECATED RDW RBC AUTO: 62.1 FL (ref 37–54)
DEPRECATED RDW RBC AUTO: 62.1 FL (ref 37–54)
DEPRECATED RDW RBC AUTO: 63.4 FL (ref 37–54)
DEPRECATED RDW RBC AUTO: 63.4 FL (ref 37–54)
DEPRECATED RDW RBC AUTO: 63.9 FL (ref 37–54)
DEPRECATED RDW RBC AUTO: 65.2 FL (ref 37–54)
DEPRECATED RDW RBC AUTO: 65.2 FL (ref 37–54)
DEPRECATED RDW RBC AUTO: 65.6 FL (ref 37–54)
DEPRECATED RDW RBC AUTO: 66.5 FL (ref 37–54)
DEPRECATED RDW RBC AUTO: 67.4 FL (ref 37–54)
DEPRECATED RDW RBC AUTO: 69.6 FL (ref 37–54)
DEPRECATED RDW RBC AUTO: 69.6 FL (ref 37–54)
EGFRCR SERPLBLD CKD-EPI 2021: 39.3 ML/MIN/1.73
EGFRCR SERPLBLD CKD-EPI 2021: 42.2 ML/MIN/1.73
EGFRCR SERPLBLD CKD-EPI 2021: 44.4 ML/MIN/1.73
EGFRCR SERPLBLD CKD-EPI 2021: 46 ML/MIN/1.73
EGFRCR SERPLBLD CKD-EPI 2021: 46.8 ML/MIN/1.73
EGFRCR SERPLBLD CKD-EPI 2021: 49 ML/MIN/1.73
EGFRCR SERPLBLD CKD-EPI 2021: 50.4 ML/MIN/1.73
EGFRCR SERPLBLD CKD-EPI 2021: 50.4 ML/MIN/1.73
EGFRCR SERPLBLD CKD-EPI 2021: 51.2 ML/MIN/1.73
EGFRCR SERPLBLD CKD-EPI 2021: 51.6 ML/MIN/1.73
EGFRCR SERPLBLD CKD-EPI 2021: 53.8 ML/MIN/1.73
EGFRCR SERPLBLD CKD-EPI 2021: 55.2 ML/MIN/1.73
EGFRCR SERPLBLD CKD-EPI 2021: 55.4 ML/MIN/1.73
EGFRCR SERPLBLD CKD-EPI 2021: 56.9 ML/MIN/1.73
EGFRCR SERPLBLD CKD-EPI 2021: 58.4 ML/MIN/1.73
EGFRCR SERPLBLD CKD-EPI 2021: 58.9 ML/MIN/1.73
EGFRCR SERPLBLD CKD-EPI 2021: 58.9 ML/MIN/1.73
EGFRCR SERPLBLD CKD-EPI 2021: 59.7 ML/MIN/1.73
EGFRCR SERPLBLD CKD-EPI 2021: 60.6 ML/MIN/1.73
EGFRCR SERPLBLD CKD-EPI 2021: 61.2 ML/MIN/1.73
EGFRCR SERPLBLD CKD-EPI 2021: 61.2 ML/MIN/1.73
EGFRCR SERPLBLD CKD-EPI 2021: 61.4 ML/MIN/1.73
EGFRCR SERPLBLD CKD-EPI 2021: 61.4 ML/MIN/1.73
EGFRCR SERPLBLD CKD-EPI 2021: 62.1 ML/MIN/1.73
EGFRCR SERPLBLD CKD-EPI 2021: 62.7 ML/MIN/1.73
EGFRCR SERPLBLD CKD-EPI 2021: 62.7 ML/MIN/1.73
EGFRCR SERPLBLD CKD-EPI 2021: 63.1 ML/MIN/1.73
EGFRCR SERPLBLD CKD-EPI 2021: 64 ML/MIN/1.73
EGFRCR SERPLBLD CKD-EPI 2021: 64.3 ML/MIN/1.73
EGFRCR SERPLBLD CKD-EPI 2021: 64.6 ML/MIN/1.73
EGFRCR SERPLBLD CKD-EPI 2021: 65.7 ML/MIN/1.73
EGFRCR SERPLBLD CKD-EPI 2021: 66 ML/MIN/1.73
EGFRCR SERPLBLD CKD-EPI 2021: 67.4 ML/MIN/1.73
EGFRCR SERPLBLD CKD-EPI 2021: 67.8 ML/MIN/1.73
EGFRCR SERPLBLD CKD-EPI 2021: 68.5 ML/MIN/1.73
EGFRCR SERPLBLD CKD-EPI 2021: 69.2 ML/MIN/1.73
EGFRCR SERPLBLD CKD-EPI 2021: 71.6 ML/MIN/1.73
EGFRCR SERPLBLD CKD-EPI 2021: 72.7 ML/MIN/1.73
EGFRCR SERPLBLD CKD-EPI 2021: 74.9 ML/MIN/1.73
EGFRCR SERPLBLD CKD-EPI 2021: 75.3 ML/MIN/1.73
EGFRCR SERPLBLD CKD-EPI 2021: 76.2 ML/MIN/1.73
EGFRCR SERPLBLD CKD-EPI 2021: 78 ML/MIN/1.73
EGFRCR SERPLBLD CKD-EPI 2021: 84.5 ML/MIN/1.73
EGFRCR SERPLBLD CKD-EPI 2021: 85.1 ML/MIN/1.73
EGFRCR SERPLBLD CKD-EPI 2021: 85.6 ML/MIN/1.73
EGFRCR SERPLBLD CKD-EPI 2021: 86.7 ML/MIN/1.73
EGFRCR SERPLBLD CKD-EPI 2021: 89.1 ML/MIN/1.73
ELLIPTOCYTES BLD QL SMEAR: ABNORMAL
EOSINOPHIL # BLD AUTO: 0 10*3/MM3 (ref 0–0.4)
EOSINOPHIL # BLD AUTO: 0.1 10*3/MM3 (ref 0–0.4)
EOSINOPHIL # BLD AUTO: 0.2 10*3/MM3 (ref 0–0.4)
EOSINOPHIL # BLD AUTO: 0.3 10*3/MM3 (ref 0–0.4)
EOSINOPHIL # BLD AUTO: 0.4 10*3/MM3 (ref 0–0.4)
EOSINOPHIL # BLD AUTO: 0.5 10*3/MM3 (ref 0–0.4)
EOSINOPHIL # BLD AUTO: 0.6 10*3/MM3 (ref 0–0.4)
EOSINOPHIL # BLD MANUAL: 0.09 10*3/MM3 (ref 0–0.4)
EOSINOPHIL NFR BLD AUTO: 0 % (ref 0.3–6.2)
EOSINOPHIL NFR BLD AUTO: 0.1 % (ref 0.3–6.2)
EOSINOPHIL NFR BLD AUTO: 0.2 % (ref 0.3–6.2)
EOSINOPHIL NFR BLD AUTO: 0.2 % (ref 0.3–6.2)
EOSINOPHIL NFR BLD AUTO: 0.5 % (ref 0.3–6.2)
EOSINOPHIL NFR BLD AUTO: 0.7 % (ref 0.3–6.2)
EOSINOPHIL NFR BLD AUTO: 0.8 % (ref 0.3–6.2)
EOSINOPHIL NFR BLD AUTO: 1 % (ref 0.3–6.2)
EOSINOPHIL NFR BLD AUTO: 1 % (ref 0.3–6.2)
EOSINOPHIL NFR BLD AUTO: 1.2 % (ref 0.3–6.2)
EOSINOPHIL NFR BLD AUTO: 1.2 % (ref 0.3–6.2)
EOSINOPHIL NFR BLD AUTO: 1.4 % (ref 0.3–6.2)
EOSINOPHIL NFR BLD AUTO: 1.4 % (ref 0.3–6.2)
EOSINOPHIL NFR BLD AUTO: 1.7 % (ref 0.3–6.2)
EOSINOPHIL NFR BLD AUTO: 1.9 % (ref 0.3–6.2)
EOSINOPHIL NFR BLD AUTO: 2 % (ref 0.3–6.2)
EOSINOPHIL NFR BLD AUTO: 2.1 % (ref 0.3–6.2)
EOSINOPHIL NFR BLD AUTO: 2.3 % (ref 0.3–6.2)
EOSINOPHIL NFR BLD AUTO: 2.4 % (ref 0.3–6.2)
EOSINOPHIL NFR BLD AUTO: 2.8 % (ref 0.3–6.2)
EOSINOPHIL NFR BLD AUTO: 3.3 % (ref 0.3–6.2)
EOSINOPHIL NFR BLD AUTO: 3.4 % (ref 0.3–6.2)
EOSINOPHIL NFR BLD AUTO: 3.5 % (ref 0.3–6.2)
EOSINOPHIL NFR BLD AUTO: 3.6 % (ref 0.3–6.2)
EOSINOPHIL NFR BLD AUTO: 3.6 % (ref 0.3–6.2)
EOSINOPHIL NFR BLD AUTO: 3.7 % (ref 0.3–6.2)
EOSINOPHIL NFR BLD AUTO: 3.8 % (ref 0.3–6.2)
EOSINOPHIL NFR BLD AUTO: 4.1 % (ref 0.3–6.2)
EOSINOPHIL NFR BLD AUTO: 4.4 % (ref 0.3–6.2)
EOSINOPHIL NFR BLD AUTO: 4.5 % (ref 0.3–6.2)
EOSINOPHIL NFR BLD AUTO: 4.5 % (ref 0.3–6.2)
EOSINOPHIL NFR BLD AUTO: 4.6 % (ref 0.3–6.2)
EOSINOPHIL NFR BLD AUTO: 4.7 % (ref 0.3–6.2)
EOSINOPHIL NFR BLD AUTO: 5 % (ref 0.3–6.2)
EOSINOPHIL NFR BLD AUTO: 5.7 % (ref 0.3–6.2)
EOSINOPHIL NFR BLD AUTO: 5.8 % (ref 0.3–6.2)
EOSINOPHIL NFR BLD AUTO: 5.9 % (ref 0.3–6.2)
EOSINOPHIL NFR BLD AUTO: 6.6 % (ref 0.3–6.2)
EOSINOPHIL NFR BLD AUTO: 7.9 % (ref 0.3–6.2)
EOSINOPHIL NFR BLD MANUAL: 1 % (ref 0.3–6.2)
EOSINOPHIL SPEC QL MICRO: 0 % EOS/100 CELLS (ref 0–0)
ERYTHROCYTE [DISTWIDTH] IN BLOOD BY AUTOMATED COUNT: 16.9 % (ref 12.3–15.4)
ERYTHROCYTE [DISTWIDTH] IN BLOOD BY AUTOMATED COUNT: 17 % (ref 12.3–15.4)
ERYTHROCYTE [DISTWIDTH] IN BLOOD BY AUTOMATED COUNT: 17.2 % (ref 12.3–15.4)
ERYTHROCYTE [DISTWIDTH] IN BLOOD BY AUTOMATED COUNT: 17.2 % (ref 12.3–15.4)
ERYTHROCYTE [DISTWIDTH] IN BLOOD BY AUTOMATED COUNT: 17.3 % (ref 12.3–15.4)
ERYTHROCYTE [DISTWIDTH] IN BLOOD BY AUTOMATED COUNT: 17.3 % (ref 12.3–15.4)
ERYTHROCYTE [DISTWIDTH] IN BLOOD BY AUTOMATED COUNT: 17.4 % (ref 12.3–15.4)
ERYTHROCYTE [DISTWIDTH] IN BLOOD BY AUTOMATED COUNT: 17.4 % (ref 12.3–15.4)
ERYTHROCYTE [DISTWIDTH] IN BLOOD BY AUTOMATED COUNT: 17.5 % (ref 12.3–15.4)
ERYTHROCYTE [DISTWIDTH] IN BLOOD BY AUTOMATED COUNT: 17.6 % (ref 12.3–15.4)
ERYTHROCYTE [DISTWIDTH] IN BLOOD BY AUTOMATED COUNT: 17.8 % (ref 12.3–15.4)
ERYTHROCYTE [DISTWIDTH] IN BLOOD BY AUTOMATED COUNT: 17.8 % (ref 12.3–15.4)
ERYTHROCYTE [DISTWIDTH] IN BLOOD BY AUTOMATED COUNT: 17.9 % (ref 12.3–15.4)
ERYTHROCYTE [DISTWIDTH] IN BLOOD BY AUTOMATED COUNT: 18 % (ref 12.3–15.4)
ERYTHROCYTE [DISTWIDTH] IN BLOOD BY AUTOMATED COUNT: 18 % (ref 12.3–15.4)
ERYTHROCYTE [DISTWIDTH] IN BLOOD BY AUTOMATED COUNT: 18.3 % (ref 12.3–15.4)
ERYTHROCYTE [DISTWIDTH] IN BLOOD BY AUTOMATED COUNT: 18.4 % (ref 12.3–15.4)
ERYTHROCYTE [DISTWIDTH] IN BLOOD BY AUTOMATED COUNT: 18.4 % (ref 12.3–15.4)
ERYTHROCYTE [DISTWIDTH] IN BLOOD BY AUTOMATED COUNT: 18.5 % (ref 12.3–15.4)
ERYTHROCYTE [DISTWIDTH] IN BLOOD BY AUTOMATED COUNT: 18.7 % (ref 12.3–15.4)
ERYTHROCYTE [DISTWIDTH] IN BLOOD BY AUTOMATED COUNT: 18.7 % (ref 12.3–15.4)
ERYTHROCYTE [DISTWIDTH] IN BLOOD BY AUTOMATED COUNT: 18.8 % (ref 12.3–15.4)
ERYTHROCYTE [DISTWIDTH] IN BLOOD BY AUTOMATED COUNT: 18.8 % (ref 12.3–15.4)
ERYTHROCYTE [DISTWIDTH] IN BLOOD BY AUTOMATED COUNT: 18.9 % (ref 12.3–15.4)
ERYTHROCYTE [DISTWIDTH] IN BLOOD BY AUTOMATED COUNT: 20 % (ref 12.3–15.4)
ERYTHROCYTE [DISTWIDTH] IN BLOOD BY AUTOMATED COUNT: 20 % (ref 12.3–15.4)
ERYTHROCYTE [DISTWIDTH] IN BLOOD BY AUTOMATED COUNT: 20.7 % (ref 12.3–15.4)
ERYTHROCYTE [DISTWIDTH] IN BLOOD BY AUTOMATED COUNT: 21 % (ref 12.3–15.4)
ERYTHROCYTE [DISTWIDTH] IN BLOOD BY AUTOMATED COUNT: 21.2 % (ref 12.3–15.4)
ERYTHROCYTE [DISTWIDTH] IN BLOOD BY AUTOMATED COUNT: 21.2 % (ref 12.3–15.4)
ERYTHROCYTE [DISTWIDTH] IN BLOOD BY AUTOMATED COUNT: 21.3 % (ref 12.3–15.4)
ERYTHROCYTE [DISTWIDTH] IN BLOOD BY AUTOMATED COUNT: 21.4 % (ref 12.3–15.4)
ERYTHROCYTE [DISTWIDTH] IN BLOOD BY AUTOMATED COUNT: 21.4 % (ref 12.3–15.4)
ERYTHROCYTE [DISTWIDTH] IN BLOOD BY AUTOMATED COUNT: 21.5 % (ref 12.3–15.4)
ERYTHROCYTE [DISTWIDTH] IN BLOOD BY AUTOMATED COUNT: 21.6 % (ref 12.3–15.4)
ERYTHROCYTE [DISTWIDTH] IN BLOOD BY AUTOMATED COUNT: 21.6 % (ref 12.3–15.4)
ERYTHROCYTE [DISTWIDTH] IN BLOOD BY AUTOMATED COUNT: 21.9 % (ref 12.3–15.4)
ERYTHROCYTE [DISTWIDTH] IN BLOOD BY AUTOMATED COUNT: 22.3 % (ref 12.3–15.4)
ERYTHROCYTE [DISTWIDTH] IN BLOOD BY AUTOMATED COUNT: 22.5 % (ref 12.3–15.4)
ERYTHROCYTE [DISTWIDTH] IN BLOOD BY AUTOMATED COUNT: 22.5 % (ref 12.3–15.4)
ERYTHROCYTE [DISTWIDTH] IN BLOOD BY AUTOMATED COUNT: 22.6 % (ref 12.3–15.4)
ERYTHROCYTE [DISTWIDTH] IN BLOOD BY AUTOMATED COUNT: 22.6 % (ref 12.3–15.4)
ERYTHROCYTE [DISTWIDTH] IN BLOOD BY AUTOMATED COUNT: 22.7 % (ref 12.3–15.4)
ERYTHROCYTE [DISTWIDTH] IN BLOOD BY AUTOMATED COUNT: 22.7 % (ref 12.3–15.4)
ERYTHROCYTE [DISTWIDTH] IN BLOOD BY AUTOMATED COUNT: 22.8 % (ref 12.3–15.4)
ERYTHROCYTE [DISTWIDTH] IN BLOOD BY AUTOMATED COUNT: 23.1 % (ref 12.3–15.4)
ERYTHROCYTE [DISTWIDTH] IN BLOOD BY AUTOMATED COUNT: 23.2 % (ref 12.3–15.4)
ERYTHROCYTE [DISTWIDTH] IN BLOOD BY AUTOMATED COUNT: 23.5 % (ref 12.3–15.4)
ERYTHROCYTE [DISTWIDTH] IN BLOOD BY AUTOMATED COUNT: 23.7 % (ref 12.3–15.4)
ERYTHROCYTE [DISTWIDTH] IN BLOOD BY AUTOMATED COUNT: 24 % (ref 12.3–15.4)
ERYTHROCYTE [DISTWIDTH] IN BLOOD BY AUTOMATED COUNT: 24.1 % (ref 12.3–15.4)
ERYTHROCYTE [DISTWIDTH] IN BLOOD BY AUTOMATED COUNT: 25.4 % (ref 12.3–15.4)
ERYTHROCYTE [SEDIMENTATION RATE] IN BLOOD: 13 MM/HR (ref 0–20)
ERYTHROCYTE [SEDIMENTATION RATE] IN BLOOD: 19 MM/HR (ref 0–20)
ERYTHROCYTE [SEDIMENTATION RATE] IN BLOOD: 27 MM/HR (ref 0–20)
FLUAV SUBTYP SPEC NAA+PROBE: NOT DETECTED
FLUBV RNA ISLT QL NAA+PROBE: NOT DETECTED
FUNGUS WND CULT: ABNORMAL
GIANT PLATELETS: ABNORMAL
GLOBULIN UR ELPH-MCNC: 2.4 GM/DL
GLOBULIN UR ELPH-MCNC: 2.6 GM/DL
GLOBULIN UR ELPH-MCNC: 2.7 GM/DL
GLOBULIN UR ELPH-MCNC: 2.8 GM/DL
GLOBULIN UR ELPH-MCNC: 3.1 GM/DL
GLOBULIN UR ELPH-MCNC: 3.1 GM/DL
GLOBULIN UR ELPH-MCNC: 3.2 GM/DL
GLOBULIN UR ELPH-MCNC: 3.4 GM/DL
GLOBULIN UR ELPH-MCNC: 3.5 GM/DL
GLOBULIN UR ELPH-MCNC: 3.6 GM/DL
GLOBULIN UR ELPH-MCNC: 3.7 GM/DL
GLOBULIN UR ELPH-MCNC: 4 GM/DL
GLUCOSE BLDC GLUCOMTR-MCNC: 100 MG/DL (ref 70–105)
GLUCOSE BLDC GLUCOMTR-MCNC: 101 MG/DL (ref 70–105)
GLUCOSE BLDC GLUCOMTR-MCNC: 103 MG/DL (ref 70–105)
GLUCOSE BLDC GLUCOMTR-MCNC: 105 MG/DL (ref 70–105)
GLUCOSE BLDC GLUCOMTR-MCNC: 108 MG/DL (ref 70–105)
GLUCOSE BLDC GLUCOMTR-MCNC: 111 MG/DL (ref 70–105)
GLUCOSE BLDC GLUCOMTR-MCNC: 112 MG/DL (ref 70–105)
GLUCOSE BLDC GLUCOMTR-MCNC: 114 MG/DL (ref 70–105)
GLUCOSE BLDC GLUCOMTR-MCNC: 116 MG/DL (ref 70–105)
GLUCOSE BLDC GLUCOMTR-MCNC: 118 MG/DL (ref 70–105)
GLUCOSE BLDC GLUCOMTR-MCNC: 119 MG/DL (ref 70–105)
GLUCOSE BLDC GLUCOMTR-MCNC: 122 MG/DL (ref 70–105)
GLUCOSE BLDC GLUCOMTR-MCNC: 125 MG/DL (ref 70–105)
GLUCOSE BLDC GLUCOMTR-MCNC: 126 MG/DL (ref 70–105)
GLUCOSE BLDC GLUCOMTR-MCNC: 128 MG/DL (ref 70–105)
GLUCOSE BLDC GLUCOMTR-MCNC: 133 MG/DL (ref 70–105)
GLUCOSE BLDC GLUCOMTR-MCNC: 137 MG/DL (ref 70–105)
GLUCOSE BLDC GLUCOMTR-MCNC: 137 MG/DL (ref 70–105)
GLUCOSE BLDC GLUCOMTR-MCNC: 140 MG/DL (ref 70–105)
GLUCOSE BLDC GLUCOMTR-MCNC: 141 MG/DL (ref 70–105)
GLUCOSE BLDC GLUCOMTR-MCNC: 142 MG/DL (ref 70–105)
GLUCOSE BLDC GLUCOMTR-MCNC: 143 MG/DL (ref 70–105)
GLUCOSE BLDC GLUCOMTR-MCNC: 144 MG/DL (ref 70–105)
GLUCOSE BLDC GLUCOMTR-MCNC: 145 MG/DL (ref 70–105)
GLUCOSE BLDC GLUCOMTR-MCNC: 147 MG/DL (ref 70–105)
GLUCOSE BLDC GLUCOMTR-MCNC: 147 MG/DL (ref 70–105)
GLUCOSE BLDC GLUCOMTR-MCNC: 148 MG/DL (ref 70–105)
GLUCOSE BLDC GLUCOMTR-MCNC: 149 MG/DL (ref 70–105)
GLUCOSE BLDC GLUCOMTR-MCNC: 150 MG/DL (ref 70–105)
GLUCOSE BLDC GLUCOMTR-MCNC: 151 MG/DL (ref 70–105)
GLUCOSE BLDC GLUCOMTR-MCNC: 153 MG/DL (ref 70–105)
GLUCOSE BLDC GLUCOMTR-MCNC: 154 MG/DL (ref 70–105)
GLUCOSE BLDC GLUCOMTR-MCNC: 156 MG/DL (ref 70–105)
GLUCOSE BLDC GLUCOMTR-MCNC: 158 MG/DL (ref 70–105)
GLUCOSE BLDC GLUCOMTR-MCNC: 158 MG/DL (ref 70–105)
GLUCOSE BLDC GLUCOMTR-MCNC: 159 MG/DL (ref 70–105)
GLUCOSE BLDC GLUCOMTR-MCNC: 159 MG/DL (ref 70–105)
GLUCOSE BLDC GLUCOMTR-MCNC: 160 MG/DL (ref 70–105)
GLUCOSE BLDC GLUCOMTR-MCNC: 160 MG/DL (ref 70–105)
GLUCOSE BLDC GLUCOMTR-MCNC: 161 MG/DL (ref 70–105)
GLUCOSE BLDC GLUCOMTR-MCNC: 162 MG/DL (ref 70–105)
GLUCOSE BLDC GLUCOMTR-MCNC: 162 MG/DL (ref 70–105)
GLUCOSE BLDC GLUCOMTR-MCNC: 163 MG/DL (ref 70–105)
GLUCOSE BLDC GLUCOMTR-MCNC: 163 MG/DL (ref 70–105)
GLUCOSE BLDC GLUCOMTR-MCNC: 166 MG/DL (ref 70–105)
GLUCOSE BLDC GLUCOMTR-MCNC: 167 MG/DL (ref 70–105)
GLUCOSE BLDC GLUCOMTR-MCNC: 168 MG/DL (ref 70–105)
GLUCOSE BLDC GLUCOMTR-MCNC: 169 MG/DL (ref 70–105)
GLUCOSE BLDC GLUCOMTR-MCNC: 170 MG/DL (ref 70–105)
GLUCOSE BLDC GLUCOMTR-MCNC: 171 MG/DL (ref 70–105)
GLUCOSE BLDC GLUCOMTR-MCNC: 171 MG/DL (ref 70–105)
GLUCOSE BLDC GLUCOMTR-MCNC: 172 MG/DL (ref 70–105)
GLUCOSE BLDC GLUCOMTR-MCNC: 176 MG/DL (ref 70–105)
GLUCOSE BLDC GLUCOMTR-MCNC: 179 MG/DL (ref 70–105)
GLUCOSE BLDC GLUCOMTR-MCNC: 183 MG/DL (ref 70–105)
GLUCOSE BLDC GLUCOMTR-MCNC: 183 MG/DL (ref 70–105)
GLUCOSE BLDC GLUCOMTR-MCNC: 186 MG/DL (ref 70–105)
GLUCOSE BLDC GLUCOMTR-MCNC: 186 MG/DL (ref 70–105)
GLUCOSE BLDC GLUCOMTR-MCNC: 187 MG/DL (ref 70–105)
GLUCOSE BLDC GLUCOMTR-MCNC: 187 MG/DL (ref 70–105)
GLUCOSE BLDC GLUCOMTR-MCNC: 190 MG/DL (ref 70–105)
GLUCOSE BLDC GLUCOMTR-MCNC: 193 MG/DL (ref 70–105)
GLUCOSE BLDC GLUCOMTR-MCNC: 195 MG/DL (ref 70–105)
GLUCOSE BLDC GLUCOMTR-MCNC: 197 MG/DL (ref 70–105)
GLUCOSE BLDC GLUCOMTR-MCNC: 198 MG/DL (ref 70–105)
GLUCOSE BLDC GLUCOMTR-MCNC: 201 MG/DL (ref 70–105)
GLUCOSE BLDC GLUCOMTR-MCNC: 201 MG/DL (ref 70–105)
GLUCOSE BLDC GLUCOMTR-MCNC: 203 MG/DL (ref 70–105)
GLUCOSE BLDC GLUCOMTR-MCNC: 203 MG/DL (ref 70–105)
GLUCOSE BLDC GLUCOMTR-MCNC: 206 MG/DL (ref 70–105)
GLUCOSE BLDC GLUCOMTR-MCNC: 207 MG/DL (ref 70–105)
GLUCOSE BLDC GLUCOMTR-MCNC: 208 MG/DL (ref 70–105)
GLUCOSE BLDC GLUCOMTR-MCNC: 209 MG/DL (ref 70–105)
GLUCOSE BLDC GLUCOMTR-MCNC: 210 MG/DL (ref 70–105)
GLUCOSE BLDC GLUCOMTR-MCNC: 211 MG/DL (ref 70–105)
GLUCOSE BLDC GLUCOMTR-MCNC: 212 MG/DL (ref 70–105)
GLUCOSE BLDC GLUCOMTR-MCNC: 215 MG/DL (ref 70–105)
GLUCOSE BLDC GLUCOMTR-MCNC: 215 MG/DL (ref 74–100)
GLUCOSE BLDC GLUCOMTR-MCNC: 217 MG/DL (ref 70–105)
GLUCOSE BLDC GLUCOMTR-MCNC: 221 MG/DL (ref 70–105)
GLUCOSE BLDC GLUCOMTR-MCNC: 223 MG/DL (ref 70–105)
GLUCOSE BLDC GLUCOMTR-MCNC: 235 MG/DL (ref 70–105)
GLUCOSE BLDC GLUCOMTR-MCNC: 235 MG/DL (ref 70–105)
GLUCOSE BLDC GLUCOMTR-MCNC: 242 MG/DL (ref 70–105)
GLUCOSE BLDC GLUCOMTR-MCNC: 242 MG/DL (ref 70–105)
GLUCOSE BLDC GLUCOMTR-MCNC: 243 MG/DL (ref 70–105)
GLUCOSE BLDC GLUCOMTR-MCNC: 243 MG/DL (ref 70–105)
GLUCOSE BLDC GLUCOMTR-MCNC: 244 MG/DL (ref 70–105)
GLUCOSE BLDC GLUCOMTR-MCNC: 248 MG/DL (ref 70–105)
GLUCOSE BLDC GLUCOMTR-MCNC: 249 MG/DL (ref 70–105)
GLUCOSE BLDC GLUCOMTR-MCNC: 252 MG/DL (ref 70–105)
GLUCOSE BLDC GLUCOMTR-MCNC: 252 MG/DL (ref 70–105)
GLUCOSE BLDC GLUCOMTR-MCNC: 255 MG/DL (ref 70–105)
GLUCOSE BLDC GLUCOMTR-MCNC: 256 MG/DL (ref 70–105)
GLUCOSE BLDC GLUCOMTR-MCNC: 258 MG/DL (ref 70–105)
GLUCOSE BLDC GLUCOMTR-MCNC: 261 MG/DL (ref 70–105)
GLUCOSE BLDC GLUCOMTR-MCNC: 261 MG/DL (ref 70–105)
GLUCOSE BLDC GLUCOMTR-MCNC: 268 MG/DL (ref 70–105)
GLUCOSE BLDC GLUCOMTR-MCNC: 271 MG/DL (ref 70–105)
GLUCOSE BLDC GLUCOMTR-MCNC: 272 MG/DL (ref 70–105)
GLUCOSE BLDC GLUCOMTR-MCNC: 275 MG/DL (ref 70–105)
GLUCOSE BLDC GLUCOMTR-MCNC: 277 MG/DL (ref 70–105)
GLUCOSE BLDC GLUCOMTR-MCNC: 281 MG/DL (ref 70–105)
GLUCOSE BLDC GLUCOMTR-MCNC: 283 MG/DL (ref 70–105)
GLUCOSE BLDC GLUCOMTR-MCNC: 283 MG/DL (ref 70–105)
GLUCOSE BLDC GLUCOMTR-MCNC: 284 MG/DL (ref 70–105)
GLUCOSE BLDC GLUCOMTR-MCNC: 284 MG/DL (ref 70–105)
GLUCOSE BLDC GLUCOMTR-MCNC: 287 MG/DL (ref 70–105)
GLUCOSE BLDC GLUCOMTR-MCNC: 293 MG/DL (ref 70–105)
GLUCOSE BLDC GLUCOMTR-MCNC: 295 MG/DL (ref 70–105)
GLUCOSE BLDC GLUCOMTR-MCNC: 303 MG/DL (ref 70–105)
GLUCOSE BLDC GLUCOMTR-MCNC: 304 MG/DL (ref 70–105)
GLUCOSE BLDC GLUCOMTR-MCNC: 308 MG/DL (ref 70–105)
GLUCOSE BLDC GLUCOMTR-MCNC: 309 MG/DL (ref 70–105)
GLUCOSE BLDC GLUCOMTR-MCNC: 309 MG/DL (ref 70–105)
GLUCOSE BLDC GLUCOMTR-MCNC: 311 MG/DL (ref 70–105)
GLUCOSE BLDC GLUCOMTR-MCNC: 314 MG/DL (ref 70–105)
GLUCOSE BLDC GLUCOMTR-MCNC: 315 MG/DL (ref 70–105)
GLUCOSE BLDC GLUCOMTR-MCNC: 319 MG/DL (ref 70–105)
GLUCOSE BLDC GLUCOMTR-MCNC: 323 MG/DL (ref 70–105)
GLUCOSE BLDC GLUCOMTR-MCNC: 324 MG/DL (ref 70–105)
GLUCOSE BLDC GLUCOMTR-MCNC: 328 MG/DL (ref 70–105)
GLUCOSE BLDC GLUCOMTR-MCNC: 337 MG/DL (ref 70–105)
GLUCOSE BLDC GLUCOMTR-MCNC: 340 MG/DL (ref 70–105)
GLUCOSE BLDC GLUCOMTR-MCNC: 346 MG/DL (ref 70–105)
GLUCOSE BLDC GLUCOMTR-MCNC: 352 MG/DL (ref 70–105)
GLUCOSE BLDC GLUCOMTR-MCNC: 375 MG/DL (ref 70–105)
GLUCOSE BLDC GLUCOMTR-MCNC: 379 MG/DL (ref 70–105)
GLUCOSE BLDC GLUCOMTR-MCNC: 384 MG/DL (ref 70–105)
GLUCOSE BLDC GLUCOMTR-MCNC: 387 MG/DL (ref 70–105)
GLUCOSE BLDC GLUCOMTR-MCNC: 395 MG/DL (ref 70–105)
GLUCOSE BLDC GLUCOMTR-MCNC: 399 MG/DL (ref 70–105)
GLUCOSE BLDC GLUCOMTR-MCNC: 400 MG/DL (ref 70–105)
GLUCOSE BLDC GLUCOMTR-MCNC: 411 MG/DL (ref 70–105)
GLUCOSE BLDC GLUCOMTR-MCNC: 417 MG/DL (ref 70–105)
GLUCOSE BLDC GLUCOMTR-MCNC: 422 MG/DL (ref 70–105)
GLUCOSE BLDC GLUCOMTR-MCNC: 425 MG/DL (ref 70–105)
GLUCOSE BLDC GLUCOMTR-MCNC: 434 MG/DL (ref 70–105)
GLUCOSE BLDC GLUCOMTR-MCNC: 436 MG/DL (ref 70–105)
GLUCOSE BLDC GLUCOMTR-MCNC: 440 MG/DL (ref 70–105)
GLUCOSE BLDC GLUCOMTR-MCNC: 443 MG/DL (ref 70–105)
GLUCOSE BLDC GLUCOMTR-MCNC: 85 MG/DL (ref 70–105)
GLUCOSE BLDC GLUCOMTR-MCNC: 85 MG/DL (ref 70–105)
GLUCOSE BLDC GLUCOMTR-MCNC: 86 MG/DL (ref 70–105)
GLUCOSE BLDC GLUCOMTR-MCNC: 87 MG/DL (ref 70–105)
GLUCOSE BLDC GLUCOMTR-MCNC: 90 MG/DL (ref 70–105)
GLUCOSE BLDC GLUCOMTR-MCNC: 96 MG/DL (ref 70–105)
GLUCOSE SERPL-MCNC: 108 MG/DL (ref 65–99)
GLUCOSE SERPL-MCNC: 109 MG/DL (ref 65–99)
GLUCOSE SERPL-MCNC: 121 MG/DL (ref 65–99)
GLUCOSE SERPL-MCNC: 123 MG/DL (ref 65–99)
GLUCOSE SERPL-MCNC: 126 MG/DL (ref 65–99)
GLUCOSE SERPL-MCNC: 144 MG/DL (ref 65–99)
GLUCOSE SERPL-MCNC: 144 MG/DL (ref 65–99)
GLUCOSE SERPL-MCNC: 146 MG/DL (ref 65–99)
GLUCOSE SERPL-MCNC: 153 MG/DL (ref 65–99)
GLUCOSE SERPL-MCNC: 160 MG/DL (ref 65–99)
GLUCOSE SERPL-MCNC: 166 MG/DL (ref 65–99)
GLUCOSE SERPL-MCNC: 166 MG/DL (ref 65–99)
GLUCOSE SERPL-MCNC: 170 MG/DL (ref 65–99)
GLUCOSE SERPL-MCNC: 173 MG/DL (ref 65–99)
GLUCOSE SERPL-MCNC: 173 MG/DL (ref 65–99)
GLUCOSE SERPL-MCNC: 176 MG/DL (ref 65–99)
GLUCOSE SERPL-MCNC: 177 MG/DL (ref 65–99)
GLUCOSE SERPL-MCNC: 179 MG/DL (ref 65–99)
GLUCOSE SERPL-MCNC: 181 MG/DL (ref 65–99)
GLUCOSE SERPL-MCNC: 184 MG/DL (ref 65–99)
GLUCOSE SERPL-MCNC: 189 MG/DL (ref 65–99)
GLUCOSE SERPL-MCNC: 191 MG/DL (ref 65–99)
GLUCOSE SERPL-MCNC: 194 MG/DL (ref 65–99)
GLUCOSE SERPL-MCNC: 196 MG/DL (ref 65–99)
GLUCOSE SERPL-MCNC: 197 MG/DL (ref 65–99)
GLUCOSE SERPL-MCNC: 208 MG/DL (ref 65–99)
GLUCOSE SERPL-MCNC: 215 MG/DL (ref 65–99)
GLUCOSE SERPL-MCNC: 215 MG/DL (ref 65–99)
GLUCOSE SERPL-MCNC: 216 MG/DL (ref 65–99)
GLUCOSE SERPL-MCNC: 219 MG/DL (ref 65–99)
GLUCOSE SERPL-MCNC: 223 MG/DL (ref 65–99)
GLUCOSE SERPL-MCNC: 226 MG/DL (ref 65–99)
GLUCOSE SERPL-MCNC: 230 MG/DL (ref 65–99)
GLUCOSE SERPL-MCNC: 234 MG/DL (ref 65–99)
GLUCOSE SERPL-MCNC: 235 MG/DL (ref 65–99)
GLUCOSE SERPL-MCNC: 236 MG/DL (ref 65–99)
GLUCOSE SERPL-MCNC: 242 MG/DL (ref 65–99)
GLUCOSE SERPL-MCNC: 245 MG/DL (ref 65–99)
GLUCOSE SERPL-MCNC: 246 MG/DL (ref 65–99)
GLUCOSE SERPL-MCNC: 263 MG/DL (ref 65–99)
GLUCOSE SERPL-MCNC: 265 MG/DL (ref 65–99)
GLUCOSE SERPL-MCNC: 266 MG/DL (ref 65–99)
GLUCOSE SERPL-MCNC: 289 MG/DL (ref 65–99)
GLUCOSE SERPL-MCNC: 292 MG/DL (ref 65–99)
GLUCOSE SERPL-MCNC: 309 MG/DL (ref 65–99)
GLUCOSE SERPL-MCNC: 314 MG/DL (ref 65–99)
GLUCOSE SERPL-MCNC: 324 MG/DL (ref 65–99)
GLUCOSE SERPL-MCNC: 327 MG/DL (ref 65–99)
GLUCOSE SERPL-MCNC: 328 MG/DL (ref 65–99)
GLUCOSE SERPL-MCNC: 338 MG/DL (ref 65–99)
GLUCOSE SERPL-MCNC: 361 MG/DL (ref 65–99)
GLUCOSE SERPL-MCNC: 403 MG/DL (ref 65–99)
GLUCOSE UR STRIP-MCNC: ABNORMAL MG/DL
GLUCOSE UR STRIP-MCNC: NEGATIVE MG/DL
GLUCOSE UR STRIP-MCNC: NEGATIVE MG/DL
GRAM STN SPEC: ABNORMAL
GRAM STN SPEC: NORMAL
GRAN CASTS URNS QL MICRO: ABNORMAL /LPF
HADV DNA SPEC NAA+PROBE: NOT DETECTED
HBA1C MFR BLD: 7.3 % (ref 3.5–5.6)
HBA1C MFR BLD: 8.5 % (ref 3.5–5.6)
HBA1C MFR BLD: 8.5 % (ref 3.5–5.6)
HCO3 BLDA-SCNC: 19.5 MMOL/L (ref 21–28)
HCO3 BLDA-SCNC: 24.4 MMOL/L (ref 21–28)
HCO3 BLDA-SCNC: 28.7 MMOL/L (ref 21–28)
HCOV 229E RNA SPEC QL NAA+PROBE: NOT DETECTED
HCOV HKU1 RNA SPEC QL NAA+PROBE: NOT DETECTED
HCOV NL63 RNA SPEC QL NAA+PROBE: NOT DETECTED
HCOV OC43 RNA SPEC QL NAA+PROBE: NOT DETECTED
HCT VFR BLD AUTO: 21.3 % (ref 37.5–51)
HCT VFR BLD AUTO: 22.5 % (ref 37.5–51)
HCT VFR BLD AUTO: 23.1 % (ref 37.5–51)
HCT VFR BLD AUTO: 23.2 % (ref 37.5–51)
HCT VFR BLD AUTO: 23.3 % (ref 37.5–51)
HCT VFR BLD AUTO: 23.8 % (ref 37.5–51)
HCT VFR BLD AUTO: 24.5 % (ref 37.5–51)
HCT VFR BLD AUTO: 24.6 % (ref 37.5–51)
HCT VFR BLD AUTO: 24.8 % (ref 37.5–51)
HCT VFR BLD AUTO: 25 % (ref 37.5–51)
HCT VFR BLD AUTO: 25.2 % (ref 37.5–51)
HCT VFR BLD AUTO: 25.4 % (ref 37.5–51)
HCT VFR BLD AUTO: 25.5 % (ref 37.5–51)
HCT VFR BLD AUTO: 25.7 % (ref 37.5–51)
HCT VFR BLD AUTO: 25.7 % (ref 37.5–51)
HCT VFR BLD AUTO: 25.9 % (ref 37.5–51)
HCT VFR BLD AUTO: 26 % (ref 37.5–51)
HCT VFR BLD AUTO: 26.3 % (ref 37.5–51)
HCT VFR BLD AUTO: 26.3 % (ref 37.5–51)
HCT VFR BLD AUTO: 26.4 % (ref 37.5–51)
HCT VFR BLD AUTO: 26.7 % (ref 37.5–51)
HCT VFR BLD AUTO: 26.7 % (ref 37.5–51)
HCT VFR BLD AUTO: 26.8 % (ref 37.5–51)
HCT VFR BLD AUTO: 26.8 % (ref 37.5–51)
HCT VFR BLD AUTO: 27 % (ref 37.5–51)
HCT VFR BLD AUTO: 27.3 % (ref 37.5–51)
HCT VFR BLD AUTO: 27.3 % (ref 37.5–51)
HCT VFR BLD AUTO: 27.4 % (ref 37.5–51)
HCT VFR BLD AUTO: 27.5 % (ref 37.5–51)
HCT VFR BLD AUTO: 28.3 % (ref 37.5–51)
HCT VFR BLD AUTO: 28.7 % (ref 37.5–51)
HCT VFR BLD AUTO: 29.2 % (ref 37.5–51)
HCT VFR BLD AUTO: 30.8 % (ref 37.5–51)
HCT VFR BLD AUTO: 30.9 % (ref 37.5–51)
HCT VFR BLD AUTO: 31.1 % (ref 37.5–51)
HCT VFR BLD AUTO: 31.4 % (ref 37.5–51)
HCT VFR BLD AUTO: 31.7 % (ref 37.5–51)
HCT VFR BLD AUTO: 32.6 % (ref 37.5–51)
HCT VFR BLD AUTO: 33 % (ref 37.5–51)
HCT VFR BLD AUTO: 33.1 % (ref 37.5–51)
HCT VFR BLD AUTO: 33.5 % (ref 37.5–51)
HCT VFR BLD AUTO: 33.8 % (ref 37.5–51)
HCT VFR BLD AUTO: 34.2 % (ref 37.5–51)
HCT VFR BLD AUTO: 34.7 % (ref 37.5–51)
HCT VFR BLD AUTO: 34.7 % (ref 37.5–51)
HCT VFR BLD AUTO: 35.7 % (ref 37.5–51)
HCT VFR BLD AUTO: 35.8 % (ref 37.5–51)
HCT VFR BLD AUTO: 36.2 % (ref 37.5–51)
HCT VFR BLD AUTO: 36.3 % (ref 37.5–51)
HCT VFR BLD AUTO: 36.6 % (ref 37.5–51)
HCT VFR BLD AUTO: 36.7 % (ref 37.5–51)
HCT VFR BLD AUTO: 37.4 % (ref 37.5–51)
HCT VFR BLD AUTO: 37.8 % (ref 37.5–51)
HCT VFR BLD AUTO: 37.9 % (ref 37.5–51)
HCT VFR BLD AUTO: 38.2 % (ref 37.5–51)
HCT VFR BLD AUTO: 38.3 % (ref 37.5–51)
HCT VFR BLD AUTO: 39.4 % (ref 37.5–51)
HCT VFR BLD AUTO: 40.8 % (ref 37.5–51)
HEMOCCULT STL QL IA: NEGATIVE
HEMODILUTION: NO
HGB BLD-MCNC: 10.3 G/DL (ref 13–17.7)
HGB BLD-MCNC: 10.5 G/DL (ref 13–17.7)
HGB BLD-MCNC: 10.6 G/DL (ref 13–17.7)
HGB BLD-MCNC: 10.7 G/DL (ref 13–17.7)
HGB BLD-MCNC: 10.8 G/DL (ref 13–17.7)
HGB BLD-MCNC: 10.8 G/DL (ref 13–17.7)
HGB BLD-MCNC: 10.9 G/DL (ref 13–17.7)
HGB BLD-MCNC: 11.1 G/DL (ref 13–17.7)
HGB BLD-MCNC: 11.2 G/DL (ref 13–17.7)
HGB BLD-MCNC: 11.4 G/DL (ref 13–17.7)
HGB BLD-MCNC: 11.6 G/DL (ref 13–17.7)
HGB BLD-MCNC: 11.7 G/DL (ref 13–17.7)
HGB BLD-MCNC: 11.9 G/DL (ref 13–17.7)
HGB BLD-MCNC: 11.9 G/DL (ref 13–17.7)
HGB BLD-MCNC: 12 G/DL (ref 13–17.7)
HGB BLD-MCNC: 12.1 G/DL (ref 13–17.7)
HGB BLD-MCNC: 12.1 G/DL (ref 13–17.7)
HGB BLD-MCNC: 12.3 G/DL (ref 13–17.7)
HGB BLD-MCNC: 12.4 G/DL (ref 13–17.7)
HGB BLD-MCNC: 12.6 G/DL (ref 13–17.7)
HGB BLD-MCNC: 12.9 G/DL (ref 13–17.7)
HGB BLD-MCNC: 13.3 G/DL (ref 13–17.7)
HGB BLD-MCNC: 7 G/DL (ref 13–17.7)
HGB BLD-MCNC: 7.1 G/DL (ref 13–17.7)
HGB BLD-MCNC: 7.3 G/DL (ref 13–17.7)
HGB BLD-MCNC: 7.3 G/DL (ref 13–17.7)
HGB BLD-MCNC: 7.4 G/DL (ref 13–17.7)
HGB BLD-MCNC: 7.4 G/DL (ref 13–17.7)
HGB BLD-MCNC: 7.5 G/DL (ref 13–17.7)
HGB BLD-MCNC: 7.5 G/DL (ref 13–17.7)
HGB BLD-MCNC: 7.6 G/DL (ref 13–17.7)
HGB BLD-MCNC: 7.6 G/DL (ref 13–17.7)
HGB BLD-MCNC: 7.7 G/DL (ref 13–17.7)
HGB BLD-MCNC: 7.8 G/DL (ref 13–17.7)
HGB BLD-MCNC: 7.9 G/DL (ref 13–17.7)
HGB BLD-MCNC: 8 G/DL (ref 13–17.7)
HGB BLD-MCNC: 8.2 G/DL (ref 13–17.7)
HGB BLD-MCNC: 8.3 G/DL (ref 13–17.7)
HGB BLD-MCNC: 8.3 G/DL (ref 13–17.7)
HGB BLD-MCNC: 8.4 G/DL (ref 13–17.7)
HGB BLD-MCNC: 8.4 G/DL (ref 13–17.7)
HGB BLD-MCNC: 8.5 G/DL (ref 13–17.7)
HGB BLD-MCNC: 8.6 G/DL (ref 13–17.7)
HGB BLD-MCNC: 8.6 G/DL (ref 13–17.7)
HGB BLD-MCNC: 8.7 G/DL (ref 13–17.7)
HGB BLD-MCNC: 8.7 G/DL (ref 13–17.7)
HGB BLD-MCNC: 9.4 G/DL (ref 13–17.7)
HGB BLD-MCNC: 9.4 G/DL (ref 13–17.7)
HGB BLD-MCNC: 9.5 G/DL (ref 13–17.7)
HGB BLD-MCNC: 9.6 G/DL (ref 13–17.7)
HGB UR QL STRIP.AUTO: ABNORMAL
HGB UR QL STRIP.AUTO: ABNORMAL
HGB UR QL STRIP.AUTO: NEGATIVE
HMPV RNA NPH QL NAA+NON-PROBE: NOT DETECTED
HOLD SPECIMEN: NORMAL
HPIV1 RNA ISLT QL NAA+PROBE: NOT DETECTED
HPIV2 RNA SPEC QL NAA+PROBE: NOT DETECTED
HPIV3 RNA NPH QL NAA+PROBE: NOT DETECTED
HPIV4 P GENE NPH QL NAA+PROBE: NOT DETECTED
HYALINE CASTS UR QL AUTO: ABNORMAL /LPF
INHALED O2 CONCENTRATION: 28 %
INHALED O2 CONCENTRATION: 44 %
INHALED O2 CONCENTRATION: <21 %
INR PPP: 1.05 (ref 0.93–1.1)
INR PPP: 1.09 (ref 0.93–1.1)
INR PPP: 1.22 (ref 0.93–1.1)
IRON 24H UR-MRATE: 204 MCG/DL (ref 59–158)
IRON 24H UR-MRATE: 30 MCG/DL (ref 59–158)
IRON 24H UR-MRATE: 43 MCG/DL (ref 59–158)
IRON SATN MFR SERPL: 11 % (ref 20–50)
IRON SATN MFR SERPL: 6 % (ref 20–50)
ISOLATED FROM: ABNORMAL
KETONES UR QL STRIP: ABNORMAL
KETONES UR QL STRIP: NEGATIVE
L PNEUMO1 AG UR QL IA: NEGATIVE
LAB AP CASE REPORT: NORMAL
LAB AP CASE REPORT: NORMAL
LEUKOCYTE ESTERASE UR QL STRIP.AUTO: ABNORMAL
LEUKOCYTE ESTERASE UR QL STRIP.AUTO: NEGATIVE
LEUKOCYTE ESTERASE UR QL STRIP.AUTO: NEGATIVE
LIPASE SERPL-CCNC: 10 U/L (ref 13–60)
LIPASE SERPL-CCNC: 26 U/L (ref 13–60)
LIPASE SERPL-CCNC: 48 U/L (ref 13–60)
LV EF 2D ECHO EST: 40 %
LYMPHOCYTES # BLD AUTO: 0.4 10*3/MM3 (ref 0.7–3.1)
LYMPHOCYTES # BLD AUTO: 0.5 10*3/MM3 (ref 0.7–3.1)
LYMPHOCYTES # BLD AUTO: 0.6 10*3/MM3 (ref 0.7–3.1)
LYMPHOCYTES # BLD AUTO: 0.7 10*3/MM3 (ref 0.7–3.1)
LYMPHOCYTES # BLD AUTO: 0.7 10*3/MM3 (ref 0.7–3.1)
LYMPHOCYTES # BLD AUTO: 0.8 10*3/MM3 (ref 0.7–3.1)
LYMPHOCYTES # BLD AUTO: 0.9 10*3/MM3 (ref 0.7–3.1)
LYMPHOCYTES # BLD AUTO: 1 10*3/MM3 (ref 0.7–3.1)
LYMPHOCYTES # BLD AUTO: 1.1 10*3/MM3 (ref 0.7–3.1)
LYMPHOCYTES # BLD AUTO: 1.2 10*3/MM3 (ref 0.7–3.1)
LYMPHOCYTES # BLD AUTO: 1.3 10*3/MM3 (ref 0.7–3.1)
LYMPHOCYTES # BLD AUTO: 1.5 10*3/MM3 (ref 0.7–3.1)
LYMPHOCYTES # BLD AUTO: 2 10*3/MM3 (ref 0.7–3.1)
LYMPHOCYTES # BLD AUTO: 2 10*3/MM3 (ref 0.7–3.1)
LYMPHOCYTES # BLD AUTO: 2.1 10*3/MM3 (ref 0.7–3.1)
LYMPHOCYTES # BLD AUTO: 2.2 10*3/MM3 (ref 0.7–3.1)
LYMPHOCYTES # BLD AUTO: 2.3 10*3/MM3 (ref 0.7–3.1)
LYMPHOCYTES # BLD MANUAL: 0.31 10*3/MM3 (ref 0.7–3.1)
LYMPHOCYTES # BLD MANUAL: 0.97 10*3/MM3 (ref 0.7–3.1)
LYMPHOCYTES # BLD MANUAL: 1.02 10*3/MM3 (ref 0.7–3.1)
LYMPHOCYTES # BLD MANUAL: 1.28 10*3/MM3 (ref 0.7–3.1)
LYMPHOCYTES NFR BLD AUTO: 10.1 % (ref 19.6–45.3)
LYMPHOCYTES NFR BLD AUTO: 11.1 % (ref 19.6–45.3)
LYMPHOCYTES NFR BLD AUTO: 12 % (ref 19.6–45.3)
LYMPHOCYTES NFR BLD AUTO: 12.6 % (ref 19.6–45.3)
LYMPHOCYTES NFR BLD AUTO: 12.9 % (ref 19.6–45.3)
LYMPHOCYTES NFR BLD AUTO: 13.2 % (ref 19.6–45.3)
LYMPHOCYTES NFR BLD AUTO: 13.2 % (ref 19.6–45.3)
LYMPHOCYTES NFR BLD AUTO: 13.9 % (ref 19.6–45.3)
LYMPHOCYTES NFR BLD AUTO: 14.4 % (ref 19.6–45.3)
LYMPHOCYTES NFR BLD AUTO: 14.5 % (ref 19.6–45.3)
LYMPHOCYTES NFR BLD AUTO: 14.8 % (ref 19.6–45.3)
LYMPHOCYTES NFR BLD AUTO: 14.9 % (ref 19.6–45.3)
LYMPHOCYTES NFR BLD AUTO: 15.1 % (ref 19.6–45.3)
LYMPHOCYTES NFR BLD AUTO: 15.1 % (ref 19.6–45.3)
LYMPHOCYTES NFR BLD AUTO: 15.2 % (ref 19.6–45.3)
LYMPHOCYTES NFR BLD AUTO: 15.3 % (ref 19.6–45.3)
LYMPHOCYTES NFR BLD AUTO: 16.1 % (ref 19.6–45.3)
LYMPHOCYTES NFR BLD AUTO: 16.3 % (ref 19.6–45.3)
LYMPHOCYTES NFR BLD AUTO: 16.8 % (ref 19.6–45.3)
LYMPHOCYTES NFR BLD AUTO: 17.8 % (ref 19.6–45.3)
LYMPHOCYTES NFR BLD AUTO: 17.9 % (ref 19.6–45.3)
LYMPHOCYTES NFR BLD AUTO: 18 % (ref 19.6–45.3)
LYMPHOCYTES NFR BLD AUTO: 18.7 % (ref 19.6–45.3)
LYMPHOCYTES NFR BLD AUTO: 19.2 % (ref 19.6–45.3)
LYMPHOCYTES NFR BLD AUTO: 20.9 % (ref 19.6–45.3)
LYMPHOCYTES NFR BLD AUTO: 21.7 % (ref 19.6–45.3)
LYMPHOCYTES NFR BLD AUTO: 22.2 % (ref 19.6–45.3)
LYMPHOCYTES NFR BLD AUTO: 23.1 % (ref 19.6–45.3)
LYMPHOCYTES NFR BLD AUTO: 3.4 % (ref 19.6–45.3)
LYMPHOCYTES NFR BLD AUTO: 4.2 % (ref 19.6–45.3)
LYMPHOCYTES NFR BLD AUTO: 5.1 % (ref 19.6–45.3)
LYMPHOCYTES NFR BLD AUTO: 5.2 % (ref 19.6–45.3)
LYMPHOCYTES NFR BLD AUTO: 5.3 % (ref 19.6–45.3)
LYMPHOCYTES NFR BLD AUTO: 5.3 % (ref 19.6–45.3)
LYMPHOCYTES NFR BLD AUTO: 5.5 % (ref 19.6–45.3)
LYMPHOCYTES NFR BLD AUTO: 5.6 % (ref 19.6–45.3)
LYMPHOCYTES NFR BLD AUTO: 5.9 % (ref 19.6–45.3)
LYMPHOCYTES NFR BLD AUTO: 6.5 % (ref 19.6–45.3)
LYMPHOCYTES NFR BLD AUTO: 6.7 % (ref 19.6–45.3)
LYMPHOCYTES NFR BLD AUTO: 7.4 % (ref 19.6–45.3)
LYMPHOCYTES NFR BLD AUTO: 8.1 % (ref 19.6–45.3)
LYMPHOCYTES NFR BLD AUTO: 8.1 % (ref 19.6–45.3)
LYMPHOCYTES NFR BLD AUTO: 8.2 % (ref 19.6–45.3)
LYMPHOCYTES NFR BLD AUTO: 8.5 % (ref 19.6–45.3)
LYMPHOCYTES NFR BLD AUTO: 9.3 % (ref 19.6–45.3)
LYMPHOCYTES NFR BLD AUTO: 9.4 % (ref 19.6–45.3)
LYMPHOCYTES NFR BLD AUTO: 9.5 % (ref 19.6–45.3)
LYMPHOCYTES NFR BLD MANUAL: 12 % (ref 5–12)
LYMPHOCYTES NFR BLD MANUAL: 12 % (ref 5–12)
LYMPHOCYTES NFR BLD MANUAL: 3 % (ref 5–12)
LYMPHOCYTES NFR BLD MANUAL: 3 % (ref 5–12)
M PNEUMO IGG SER IA-ACNC: NOT DETECTED
MAGNESIUM SERPL-MCNC: 1.5 MG/DL (ref 1.6–2.4)
MAGNESIUM SERPL-MCNC: 1.5 MG/DL (ref 1.6–2.4)
MAGNESIUM SERPL-MCNC: 1.7 MG/DL (ref 1.6–2.4)
MAGNESIUM SERPL-MCNC: 1.9 MG/DL (ref 1.6–2.4)
MAGNESIUM SERPL-MCNC: 2 MG/DL (ref 1.6–2.4)
MAGNESIUM SERPL-MCNC: 2.1 MG/DL (ref 1.6–2.4)
MAGNESIUM SERPL-MCNC: 2.2 MG/DL (ref 1.6–2.4)
MAGNESIUM SERPL-MCNC: 2.5 MG/DL (ref 1.6–2.4)
MAGNESIUM SERPL-MCNC: 2.7 MG/DL (ref 1.6–2.4)
MAXIMAL PREDICTED HEART RATE: 144 BPM
MAXIMAL PREDICTED HEART RATE: 144 BPM
MAXIMAL PREDICTED HEART RATE: 145 BPM
MCH RBC QN AUTO: 20.3 PG (ref 26.6–33)
MCH RBC QN AUTO: 21.2 PG (ref 26.6–33)
MCH RBC QN AUTO: 21.8 PG (ref 26.6–33)
MCH RBC QN AUTO: 21.9 PG (ref 26.6–33)
MCH RBC QN AUTO: 21.9 PG (ref 26.6–33)
MCH RBC QN AUTO: 22.1 PG (ref 26.6–33)
MCH RBC QN AUTO: 22.1 PG (ref 26.6–33)
MCH RBC QN AUTO: 22.2 PG (ref 26.6–33)
MCH RBC QN AUTO: 22.2 PG (ref 26.6–33)
MCH RBC QN AUTO: 22.3 PG (ref 26.6–33)
MCH RBC QN AUTO: 22.4 PG (ref 26.6–33)
MCH RBC QN AUTO: 22.6 PG (ref 26.6–33)
MCH RBC QN AUTO: 22.8 PG (ref 26.6–33)
MCH RBC QN AUTO: 22.8 PG (ref 26.6–33)
MCH RBC QN AUTO: 22.9 PG (ref 26.6–33)
MCH RBC QN AUTO: 23 PG (ref 26.6–33)
MCH RBC QN AUTO: 23.2 PG (ref 26.6–33)
MCH RBC QN AUTO: 23.6 PG (ref 26.6–33)
MCH RBC QN AUTO: 23.8 PG (ref 26.6–33)
MCH RBC QN AUTO: 25.1 PG (ref 26.6–33)
MCH RBC QN AUTO: 25.5 PG (ref 26.6–33)
MCH RBC QN AUTO: 25.6 PG (ref 26.6–33)
MCH RBC QN AUTO: 25.8 PG (ref 26.6–33)
MCH RBC QN AUTO: 26 PG (ref 26.6–33)
MCH RBC QN AUTO: 26.3 PG (ref 26.6–33)
MCH RBC QN AUTO: 26.4 PG (ref 26.6–33)
MCH RBC QN AUTO: 26.5 PG (ref 26.6–33)
MCH RBC QN AUTO: 26.7 PG (ref 26.6–33)
MCH RBC QN AUTO: 26.8 PG (ref 26.6–33)
MCH RBC QN AUTO: 26.9 PG (ref 26.6–33)
MCH RBC QN AUTO: 26.9 PG (ref 26.6–33)
MCH RBC QN AUTO: 27 PG (ref 26.6–33)
MCH RBC QN AUTO: 27 PG (ref 26.6–33)
MCH RBC QN AUTO: 27.1 PG (ref 26.6–33)
MCH RBC QN AUTO: 27.2 PG (ref 26.6–33)
MCH RBC QN AUTO: 27.4 PG (ref 26.6–33)
MCH RBC QN AUTO: 27.4 PG (ref 26.6–33)
MCH RBC QN AUTO: 27.5 PG (ref 26.6–33)
MCH RBC QN AUTO: 27.6 PG (ref 26.6–33)
MCH RBC QN AUTO: 28.1 PG (ref 26.6–33)
MCH RBC QN AUTO: 28.2 PG (ref 26.6–33)
MCH RBC QN AUTO: 28.7 PG (ref 26.6–33)
MCHC RBC AUTO-ENTMCNC: 26.7 G/DL (ref 31.5–35.7)
MCHC RBC AUTO-ENTMCNC: 29.3 G/DL (ref 31.5–35.7)
MCHC RBC AUTO-ENTMCNC: 29.4 G/DL (ref 31.5–35.7)
MCHC RBC AUTO-ENTMCNC: 29.6 G/DL (ref 31.5–35.7)
MCHC RBC AUTO-ENTMCNC: 29.8 G/DL (ref 31.5–35.7)
MCHC RBC AUTO-ENTMCNC: 29.9 G/DL (ref 31.5–35.7)
MCHC RBC AUTO-ENTMCNC: 29.9 G/DL (ref 31.5–35.7)
MCHC RBC AUTO-ENTMCNC: 30 G/DL (ref 31.5–35.7)
MCHC RBC AUTO-ENTMCNC: 30.2 G/DL (ref 31.5–35.7)
MCHC RBC AUTO-ENTMCNC: 30.2 G/DL (ref 31.5–35.7)
MCHC RBC AUTO-ENTMCNC: 30.4 G/DL (ref 31.5–35.7)
MCHC RBC AUTO-ENTMCNC: 30.5 G/DL (ref 31.5–35.7)
MCHC RBC AUTO-ENTMCNC: 30.6 G/DL (ref 31.5–35.7)
MCHC RBC AUTO-ENTMCNC: 30.6 G/DL (ref 31.5–35.7)
MCHC RBC AUTO-ENTMCNC: 30.8 G/DL (ref 31.5–35.7)
MCHC RBC AUTO-ENTMCNC: 30.9 G/DL (ref 31.5–35.7)
MCHC RBC AUTO-ENTMCNC: 30.9 G/DL (ref 31.5–35.7)
MCHC RBC AUTO-ENTMCNC: 31 G/DL (ref 31.5–35.7)
MCHC RBC AUTO-ENTMCNC: 31.4 G/DL (ref 31.5–35.7)
MCHC RBC AUTO-ENTMCNC: 31.5 G/DL (ref 31.5–35.7)
MCHC RBC AUTO-ENTMCNC: 31.5 G/DL (ref 31.5–35.7)
MCHC RBC AUTO-ENTMCNC: 31.6 G/DL (ref 31.5–35.7)
MCHC RBC AUTO-ENTMCNC: 31.7 G/DL (ref 31.5–35.7)
MCHC RBC AUTO-ENTMCNC: 31.9 G/DL (ref 31.5–35.7)
MCHC RBC AUTO-ENTMCNC: 32.1 G/DL (ref 31.5–35.7)
MCHC RBC AUTO-ENTMCNC: 32.2 G/DL (ref 31.5–35.7)
MCHC RBC AUTO-ENTMCNC: 32.3 G/DL (ref 31.5–35.7)
MCHC RBC AUTO-ENTMCNC: 32.4 G/DL (ref 31.5–35.7)
MCHC RBC AUTO-ENTMCNC: 32.4 G/DL (ref 31.5–35.7)
MCHC RBC AUTO-ENTMCNC: 32.5 G/DL (ref 31.5–35.7)
MCHC RBC AUTO-ENTMCNC: 32.6 G/DL (ref 31.5–35.7)
MCHC RBC AUTO-ENTMCNC: 32.6 G/DL (ref 31.5–35.7)
MCHC RBC AUTO-ENTMCNC: 32.7 G/DL (ref 31.5–35.7)
MCHC RBC AUTO-ENTMCNC: 32.8 G/DL (ref 31.5–35.7)
MCHC RBC AUTO-ENTMCNC: 32.9 G/DL (ref 31.5–35.7)
MCHC RBC AUTO-ENTMCNC: 32.9 G/DL (ref 31.5–35.7)
MCHC RBC AUTO-ENTMCNC: 33 G/DL (ref 31.5–35.7)
MCHC RBC AUTO-ENTMCNC: 33.1 G/DL (ref 31.5–35.7)
MCHC RBC AUTO-ENTMCNC: 33.2 G/DL (ref 31.5–35.7)
MCHC RBC AUTO-ENTMCNC: 33.2 G/DL (ref 31.5–35.7)
MCHC RBC AUTO-ENTMCNC: 33.6 G/DL (ref 31.5–35.7)
MCHC RBC AUTO-ENTMCNC: 33.8 G/DL (ref 31.5–35.7)
MCV RBC AUTO: 71.6 FL (ref 79–97)
MCV RBC AUTO: 72.4 FL (ref 79–97)
MCV RBC AUTO: 72.7 FL (ref 79–97)
MCV RBC AUTO: 73.1 FL (ref 79–97)
MCV RBC AUTO: 73.9 FL (ref 79–97)
MCV RBC AUTO: 74.2 FL (ref 79–97)
MCV RBC AUTO: 74.6 FL (ref 79–97)
MCV RBC AUTO: 74.6 FL (ref 79–97)
MCV RBC AUTO: 75.1 FL (ref 79–97)
MCV RBC AUTO: 75.3 FL (ref 79–97)
MCV RBC AUTO: 75.4 FL (ref 79–97)
MCV RBC AUTO: 75.7 FL (ref 79–97)
MCV RBC AUTO: 76.3 FL (ref 79–97)
MCV RBC AUTO: 76.7 FL (ref 79–97)
MCV RBC AUTO: 76.9 FL (ref 79–97)
MCV RBC AUTO: 77.6 FL (ref 79–97)
MCV RBC AUTO: 79.3 FL (ref 79–97)
MCV RBC AUTO: 79.6 FL (ref 79–97)
MCV RBC AUTO: 80.7 FL (ref 79–97)
MCV RBC AUTO: 80.7 FL (ref 79–97)
MCV RBC AUTO: 81.2 FL (ref 79–97)
MCV RBC AUTO: 81.6 FL (ref 79–97)
MCV RBC AUTO: 81.8 FL (ref 79–97)
MCV RBC AUTO: 81.8 FL (ref 79–97)
MCV RBC AUTO: 82.2 FL (ref 79–97)
MCV RBC AUTO: 82.4 FL (ref 79–97)
MCV RBC AUTO: 82.7 FL (ref 79–97)
MCV RBC AUTO: 82.9 FL (ref 79–97)
MCV RBC AUTO: 83.1 FL (ref 79–97)
MCV RBC AUTO: 83.1 FL (ref 79–97)
MCV RBC AUTO: 83.4 FL (ref 79–97)
MCV RBC AUTO: 83.4 FL (ref 79–97)
MCV RBC AUTO: 83.5 FL (ref 79–97)
MCV RBC AUTO: 83.5 FL (ref 79–97)
MCV RBC AUTO: 83.6 FL (ref 79–97)
MCV RBC AUTO: 83.9 FL (ref 79–97)
MCV RBC AUTO: 84 FL (ref 79–97)
MCV RBC AUTO: 84 FL (ref 79–97)
MCV RBC AUTO: 84.2 FL (ref 79–97)
MCV RBC AUTO: 84.2 FL (ref 79–97)
MCV RBC AUTO: 84.6 FL (ref 79–97)
MCV RBC AUTO: 84.7 FL (ref 79–97)
MCV RBC AUTO: 84.7 FL (ref 79–97)
MCV RBC AUTO: 85.1 FL (ref 79–97)
MCV RBC AUTO: 85.2 FL (ref 79–97)
MCV RBC AUTO: 85.3 FL (ref 79–97)
MCV RBC AUTO: 85.3 FL (ref 79–97)
MCV RBC AUTO: 85.9 FL (ref 79–97)
MCV RBC AUTO: 86.1 FL (ref 79–97)
MCV RBC AUTO: 92.1 FL (ref 79–97)
MODALITY: ABNORMAL
MONOCYTES # BLD AUTO: 0.4 10*3/MM3 (ref 0.1–0.9)
MONOCYTES # BLD AUTO: 0.5 10*3/MM3 (ref 0.1–0.9)
MONOCYTES # BLD AUTO: 0.6 10*3/MM3 (ref 0.1–0.9)
MONOCYTES # BLD AUTO: 0.7 10*3/MM3 (ref 0.1–0.9)
MONOCYTES # BLD AUTO: 0.8 10*3/MM3 (ref 0.1–0.9)
MONOCYTES # BLD AUTO: 0.9 10*3/MM3 (ref 0.1–0.9)
MONOCYTES # BLD AUTO: 1 10*3/MM3 (ref 0.1–0.9)
MONOCYTES # BLD AUTO: 1.2 10*3/MM3 (ref 0.1–0.9)
MONOCYTES # BLD AUTO: 1.3 10*3/MM3 (ref 0.1–0.9)
MONOCYTES # BLD AUTO: 1.4 10*3/MM3 (ref 0.1–0.9)
MONOCYTES # BLD AUTO: 1.5 10*3/MM3 (ref 0.1–0.9)
MONOCYTES # BLD AUTO: 1.5 10*3/MM3 (ref 0.1–0.9)
MONOCYTES # BLD: 0.32 10*3/MM3 (ref 0.1–0.9)
MONOCYTES # BLD: 0.46 10*3/MM3 (ref 0.1–0.9)
MONOCYTES # BLD: 1.02 10*3/MM3 (ref 0.1–0.9)
MONOCYTES # BLD: 1.22 10*3/MM3 (ref 0.1–0.9)
MONOCYTES NFR BLD AUTO: 10 % (ref 5–12)
MONOCYTES NFR BLD AUTO: 10.1 % (ref 5–12)
MONOCYTES NFR BLD AUTO: 10.2 % (ref 5–12)
MONOCYTES NFR BLD AUTO: 10.3 % (ref 5–12)
MONOCYTES NFR BLD AUTO: 10.4 % (ref 5–12)
MONOCYTES NFR BLD AUTO: 10.8 % (ref 5–12)
MONOCYTES NFR BLD AUTO: 11.1 % (ref 5–12)
MONOCYTES NFR BLD AUTO: 11.2 % (ref 5–12)
MONOCYTES NFR BLD AUTO: 11.2 % (ref 5–12)
MONOCYTES NFR BLD AUTO: 11.4 % (ref 5–12)
MONOCYTES NFR BLD AUTO: 11.5 % (ref 5–12)
MONOCYTES NFR BLD AUTO: 15 % (ref 5–12)
MONOCYTES NFR BLD AUTO: 2.6 % (ref 5–12)
MONOCYTES NFR BLD AUTO: 2.7 % (ref 5–12)
MONOCYTES NFR BLD AUTO: 4.6 % (ref 5–12)
MONOCYTES NFR BLD AUTO: 4.7 % (ref 5–12)
MONOCYTES NFR BLD AUTO: 5.3 % (ref 5–12)
MONOCYTES NFR BLD AUTO: 5.4 % (ref 5–12)
MONOCYTES NFR BLD AUTO: 5.4 % (ref 5–12)
MONOCYTES NFR BLD AUTO: 6.1 % (ref 5–12)
MONOCYTES NFR BLD AUTO: 6.4 % (ref 5–12)
MONOCYTES NFR BLD AUTO: 6.4 % (ref 5–12)
MONOCYTES NFR BLD AUTO: 6.5 % (ref 5–12)
MONOCYTES NFR BLD AUTO: 7.3 % (ref 5–12)
MONOCYTES NFR BLD AUTO: 7.4 % (ref 5–12)
MONOCYTES NFR BLD AUTO: 7.4 % (ref 5–12)
MONOCYTES NFR BLD AUTO: 7.5 % (ref 5–12)
MONOCYTES NFR BLD AUTO: 7.6 % (ref 5–12)
MONOCYTES NFR BLD AUTO: 7.6 % (ref 5–12)
MONOCYTES NFR BLD AUTO: 8 % (ref 5–12)
MONOCYTES NFR BLD AUTO: 8.2 % (ref 5–12)
MONOCYTES NFR BLD AUTO: 8.4 % (ref 5–12)
MONOCYTES NFR BLD AUTO: 8.8 % (ref 5–12)
MONOCYTES NFR BLD AUTO: 8.8 % (ref 5–12)
MONOCYTES NFR BLD AUTO: 8.9 % (ref 5–12)
MONOCYTES NFR BLD AUTO: 8.9 % (ref 5–12)
MONOCYTES NFR BLD AUTO: 9.1 % (ref 5–12)
MONOCYTES NFR BLD AUTO: 9.2 % (ref 5–12)
MONOCYTES NFR BLD AUTO: 9.4 % (ref 5–12)
MONOCYTES NFR BLD AUTO: 9.4 % (ref 5–12)
MONOCYTES NFR BLD AUTO: 9.7 % (ref 5–12)
MONOCYTES NFR BLD AUTO: 9.9 % (ref 5–12)
MONOCYTES NFR BLD AUTO: 9.9 % (ref 5–12)
MR PISA EROA: 0.3 CM2
MRSA DNA SPEC QL NAA+PROBE: ABNORMAL
MUCOUS THREADS URNS QL MICRO: ABNORMAL /HPF
MYCOBACTERIUM SPEC CULT: NORMAL
NEUTROPHILS # BLD AUTO: 14.63 10*3/MM3 (ref 1.7–7)
NEUTROPHILS # BLD AUTO: 5.95 10*3/MM3 (ref 1.7–7)
NEUTROPHILS # BLD AUTO: 7.85 10*3/MM3 (ref 1.7–7)
NEUTROPHILS # BLD AUTO: 9.5 10*3/MM3 (ref 1.7–7)
NEUTROPHILS NFR BLD AUTO: 10.4 10*3/MM3 (ref 1.7–7)
NEUTROPHILS NFR BLD AUTO: 11 10*3/MM3 (ref 1.7–7)
NEUTROPHILS NFR BLD AUTO: 11.3 10*3/MM3 (ref 1.7–7)
NEUTROPHILS NFR BLD AUTO: 11.6 10*3/MM3 (ref 1.7–7)
NEUTROPHILS NFR BLD AUTO: 11.8 10*3/MM3 (ref 1.7–7)
NEUTROPHILS NFR BLD AUTO: 12.3 10*3/MM3 (ref 1.7–7)
NEUTROPHILS NFR BLD AUTO: 13.7 10*3/MM3 (ref 1.7–7)
NEUTROPHILS NFR BLD AUTO: 14.3 10*3/MM3 (ref 1.7–7)
NEUTROPHILS NFR BLD AUTO: 14.4 10*3/MM3 (ref 1.7–7)
NEUTROPHILS NFR BLD AUTO: 15.7 10*3/MM3 (ref 1.7–7)
NEUTROPHILS NFR BLD AUTO: 17 10*3/MM3 (ref 1.7–7)
NEUTROPHILS NFR BLD AUTO: 3.5 10*3/MM3 (ref 1.7–7)
NEUTROPHILS NFR BLD AUTO: 4 10*3/MM3 (ref 1.7–7)
NEUTROPHILS NFR BLD AUTO: 4.1 10*3/MM3 (ref 1.7–7)
NEUTROPHILS NFR BLD AUTO: 4.2 10*3/MM3 (ref 1.7–7)
NEUTROPHILS NFR BLD AUTO: 4.6 10*3/MM3 (ref 1.7–7)
NEUTROPHILS NFR BLD AUTO: 5.1 10*3/MM3 (ref 1.7–7)
NEUTROPHILS NFR BLD AUTO: 5.1 10*3/MM3 (ref 1.7–7)
NEUTROPHILS NFR BLD AUTO: 5.3 10*3/MM3 (ref 1.7–7)
NEUTROPHILS NFR BLD AUTO: 5.4 10*3/MM3 (ref 1.7–7)
NEUTROPHILS NFR BLD AUTO: 5.6 10*3/MM3 (ref 1.7–7)
NEUTROPHILS NFR BLD AUTO: 5.7 10*3/MM3 (ref 1.7–7)
NEUTROPHILS NFR BLD AUTO: 5.8 10*3/MM3 (ref 1.7–7)
NEUTROPHILS NFR BLD AUTO: 5.8 10*3/MM3 (ref 1.7–7)
NEUTROPHILS NFR BLD AUTO: 5.9 10*3/MM3 (ref 1.7–7)
NEUTROPHILS NFR BLD AUTO: 5.9 10*3/MM3 (ref 1.7–7)
NEUTROPHILS NFR BLD AUTO: 6 10*3/MM3 (ref 1.7–7)
NEUTROPHILS NFR BLD AUTO: 6.4 10*3/MM3 (ref 1.7–7)
NEUTROPHILS NFR BLD AUTO: 6.4 10*3/MM3 (ref 1.7–7)
NEUTROPHILS NFR BLD AUTO: 6.7 10*3/MM3 (ref 1.7–7)
NEUTROPHILS NFR BLD AUTO: 60.7 % (ref 42.7–76)
NEUTROPHILS NFR BLD AUTO: 61.4 % (ref 42.7–76)
NEUTROPHILS NFR BLD AUTO: 62.4 % (ref 42.7–76)
NEUTROPHILS NFR BLD AUTO: 63.7 % (ref 42.7–76)
NEUTROPHILS NFR BLD AUTO: 65.7 % (ref 42.7–76)
NEUTROPHILS NFR BLD AUTO: 65.9 % (ref 42.7–76)
NEUTROPHILS NFR BLD AUTO: 66 % (ref 42.7–76)
NEUTROPHILS NFR BLD AUTO: 66.7 % (ref 42.7–76)
NEUTROPHILS NFR BLD AUTO: 67.3 % (ref 42.7–76)
NEUTROPHILS NFR BLD AUTO: 67.6 % (ref 42.7–76)
NEUTROPHILS NFR BLD AUTO: 68.1 % (ref 42.7–76)
NEUTROPHILS NFR BLD AUTO: 68.5 % (ref 42.7–76)
NEUTROPHILS NFR BLD AUTO: 69 % (ref 42.7–76)
NEUTROPHILS NFR BLD AUTO: 69.4 % (ref 42.7–76)
NEUTROPHILS NFR BLD AUTO: 69.7 % (ref 42.7–76)
NEUTROPHILS NFR BLD AUTO: 7 10*3/MM3 (ref 1.7–7)
NEUTROPHILS NFR BLD AUTO: 7 10*3/MM3 (ref 1.7–7)
NEUTROPHILS NFR BLD AUTO: 7.2 10*3/MM3 (ref 1.7–7)
NEUTROPHILS NFR BLD AUTO: 7.2 10*3/MM3 (ref 1.7–7)
NEUTROPHILS NFR BLD AUTO: 7.5 10*3/MM3 (ref 1.7–7)
NEUTROPHILS NFR BLD AUTO: 7.6 10*3/MM3 (ref 1.7–7)
NEUTROPHILS NFR BLD AUTO: 7.7 10*3/MM3 (ref 1.7–7)
NEUTROPHILS NFR BLD AUTO: 7.9 10*3/MM3 (ref 1.7–7)
NEUTROPHILS NFR BLD AUTO: 70.3 % (ref 42.7–76)
NEUTROPHILS NFR BLD AUTO: 70.5 % (ref 42.7–76)
NEUTROPHILS NFR BLD AUTO: 70.7 % (ref 42.7–76)
NEUTROPHILS NFR BLD AUTO: 71 % (ref 42.7–76)
NEUTROPHILS NFR BLD AUTO: 71.5 % (ref 42.7–76)
NEUTROPHILS NFR BLD AUTO: 72 % (ref 42.7–76)
NEUTROPHILS NFR BLD AUTO: 72.6 % (ref 42.7–76)
NEUTROPHILS NFR BLD AUTO: 73.7 % (ref 42.7–76)
NEUTROPHILS NFR BLD AUTO: 75.1 % (ref 42.7–76)
NEUTROPHILS NFR BLD AUTO: 75.2 % (ref 42.7–76)
NEUTROPHILS NFR BLD AUTO: 76 % (ref 42.7–76)
NEUTROPHILS NFR BLD AUTO: 76.5 % (ref 42.7–76)
NEUTROPHILS NFR BLD AUTO: 78.3 % (ref 42.7–76)
NEUTROPHILS NFR BLD AUTO: 78.6 % (ref 42.7–76)
NEUTROPHILS NFR BLD AUTO: 78.8 % (ref 42.7–76)
NEUTROPHILS NFR BLD AUTO: 79 % (ref 42.7–76)
NEUTROPHILS NFR BLD AUTO: 79.7 % (ref 42.7–76)
NEUTROPHILS NFR BLD AUTO: 79.9 % (ref 42.7–76)
NEUTROPHILS NFR BLD AUTO: 8.1 10*3/MM3 (ref 1.7–7)
NEUTROPHILS NFR BLD AUTO: 8.2 10*3/MM3 (ref 1.7–7)
NEUTROPHILS NFR BLD AUTO: 8.7 10*3/MM3 (ref 1.7–7)
NEUTROPHILS NFR BLD AUTO: 8.9 10*3/MM3 (ref 1.7–7)
NEUTROPHILS NFR BLD AUTO: 82.7 % (ref 42.7–76)
NEUTROPHILS NFR BLD AUTO: 83.1 % (ref 42.7–76)
NEUTROPHILS NFR BLD AUTO: 83.6 % (ref 42.7–76)
NEUTROPHILS NFR BLD AUTO: 84.2 % (ref 42.7–76)
NEUTROPHILS NFR BLD AUTO: 84.6 % (ref 42.7–76)
NEUTROPHILS NFR BLD AUTO: 84.8 % (ref 42.7–76)
NEUTROPHILS NFR BLD AUTO: 84.9 % (ref 42.7–76)
NEUTROPHILS NFR BLD AUTO: 85.6 % (ref 42.7–76)
NEUTROPHILS NFR BLD AUTO: 86.4 % (ref 42.7–76)
NEUTROPHILS NFR BLD AUTO: 88.6 % (ref 42.7–76)
NEUTROPHILS NFR BLD AUTO: 89.2 % (ref 42.7–76)
NEUTROPHILS NFR BLD AUTO: 89.9 % (ref 42.7–76)
NEUTROPHILS NFR BLD AUTO: 9.2 10*3/MM3 (ref 1.7–7)
NEUTROPHILS NFR BLD AUTO: 9.2 10*3/MM3 (ref 1.7–7)
NEUTROPHILS NFR BLD AUTO: 9.5 10*3/MM3 (ref 1.7–7)
NEUTROPHILS NFR BLD AUTO: 93 % (ref 42.7–76)
NEUTROPHILS NFR BLD AUTO: 93.9 % (ref 42.7–76)
NEUTROPHILS NFR BLD MANUAL: 59 % (ref 42.7–76)
NEUTROPHILS NFR BLD MANUAL: 75 % (ref 42.7–76)
NEUTROPHILS NFR BLD MANUAL: 82 % (ref 42.7–76)
NEUTROPHILS NFR BLD MANUAL: 89 % (ref 42.7–76)
NEUTS BAND NFR BLD MANUAL: 11 % (ref 0–5)
NEUTS BAND NFR BLD MANUAL: 2 % (ref 0–5)
NEUTS BAND NFR BLD MANUAL: 6 % (ref 0–5)
NEUTS BAND NFR BLD MANUAL: 6 % (ref 0–5)
NEUTS VAC BLD QL SMEAR: ABNORMAL
NIGHT BLUE STAIN TISS: NORMAL
NITRITE UR QL STRIP: NEGATIVE
NITRITE UR QL STRIP: POSITIVE
NRBC BLD AUTO-RTO: 0 /100 WBC (ref 0–0.2)
NRBC BLD AUTO-RTO: 0.1 /100 WBC (ref 0–0.2)
NRBC BLD AUTO-RTO: 0.2 /100 WBC (ref 0–0.2)
NRBC BLD AUTO-RTO: 0.3 /100 WBC (ref 0–0.2)
NRBC SPEC MANUAL: 1 /100 WBC (ref 0–0.2)
NT-PROBNP SERPL-MCNC: 3594 PG/ML (ref 0–1800)
NT-PROBNP SERPL-MCNC: 515.8 PG/ML (ref 0–1800)
NT-PROBNP SERPL-MCNC: 776.7 PG/ML (ref 0–1800)
NT-PROBNP SERPL-MCNC: 876.2 PG/ML (ref 0–1800)
P JIROVECII DNA L RESP QL NAA+NON-PROBE: NEGATIVE
PATH REPORT.FINAL DX SPEC: NORMAL
PATH REPORT.FINAL DX SPEC: NORMAL
PATH REPORT.GROSS SPEC: NORMAL
PATH REPORT.GROSS SPEC: NORMAL
PCO2 BLDA: 36.9 MM HG (ref 35–48)
PCO2 BLDA: 38.9 MM HG (ref 35–48)
PCO2 BLDA: 48.6 MM HG (ref 35–48)
PERCENT MAX PREDICTED HR: 50 %
PH BLDA: 7.31 PH UNITS (ref 7.35–7.45)
PH BLDA: 7.38 PH UNITS (ref 7.35–7.45)
PH BLDA: 7.43 PH UNITS (ref 7.35–7.45)
PH UR STRIP.AUTO: 7 [PH] (ref 5–8)
PH UR STRIP.AUTO: <=5 [PH] (ref 5–8)
PHOSPHATE SERPL-MCNC: 2.2 MG/DL (ref 2.5–4.5)
PHOSPHATE SERPL-MCNC: 2.6 MG/DL (ref 2.5–4.5)
PHOSPHATE SERPL-MCNC: 2.7 MG/DL (ref 2.5–4.5)
PHOSPHATE SERPL-MCNC: 3.2 MG/DL (ref 2.5–4.5)
PISA RADIUS: 0.9 CM
PLAT MORPH BLD: NORMAL
PLATELET # BLD AUTO: 256 10*3/MM3 (ref 140–450)
PLATELET # BLD AUTO: 260 10*3/MM3 (ref 140–450)
PLATELET # BLD AUTO: 270 10*3/MM3 (ref 140–450)
PLATELET # BLD AUTO: 274 10*3/MM3 (ref 140–450)
PLATELET # BLD AUTO: 278 10*3/MM3 (ref 140–450)
PLATELET # BLD AUTO: 280 10*3/MM3 (ref 140–450)
PLATELET # BLD AUTO: 286 10*3/MM3 (ref 140–450)
PLATELET # BLD AUTO: 290 10*3/MM3 (ref 140–450)
PLATELET # BLD AUTO: 294 10*3/MM3 (ref 140–450)
PLATELET # BLD AUTO: 297 10*3/MM3 (ref 140–450)
PLATELET # BLD AUTO: 299 10*3/MM3 (ref 140–450)
PLATELET # BLD AUTO: 300 10*3/MM3 (ref 140–450)
PLATELET # BLD AUTO: 302 10*3/MM3 (ref 140–450)
PLATELET # BLD AUTO: 303 10*3/MM3 (ref 140–450)
PLATELET # BLD AUTO: 308 10*3/MM3 (ref 140–450)
PLATELET # BLD AUTO: 309 10*3/MM3 (ref 140–450)
PLATELET # BLD AUTO: 309 10*3/MM3 (ref 140–450)
PLATELET # BLD AUTO: 310 10*3/MM3 (ref 140–450)
PLATELET # BLD AUTO: 312 10*3/MM3 (ref 140–450)
PLATELET # BLD AUTO: 315 10*3/MM3 (ref 140–450)
PLATELET # BLD AUTO: 317 10*3/MM3 (ref 140–450)
PLATELET # BLD AUTO: 318 10*3/MM3 (ref 140–450)
PLATELET # BLD AUTO: 322 10*3/MM3 (ref 140–450)
PLATELET # BLD AUTO: 328 10*3/MM3 (ref 140–450)
PLATELET # BLD AUTO: 332 10*3/MM3 (ref 140–450)
PLATELET # BLD AUTO: 335 10*3/MM3 (ref 140–450)
PLATELET # BLD AUTO: 336 10*3/MM3 (ref 140–450)
PLATELET # BLD AUTO: 338 10*3/MM3 (ref 140–450)
PLATELET # BLD AUTO: 341 10*3/MM3 (ref 140–450)
PLATELET # BLD AUTO: 359 10*3/MM3 (ref 140–450)
PLATELET # BLD AUTO: 363 10*3/MM3 (ref 140–450)
PLATELET # BLD AUTO: 369 10*3/MM3 (ref 140–450)
PLATELET # BLD AUTO: 372 10*3/MM3 (ref 140–450)
PLATELET # BLD AUTO: 373 10*3/MM3 (ref 140–450)
PLATELET # BLD AUTO: 376 10*3/MM3 (ref 140–450)
PLATELET # BLD AUTO: 378 10*3/MM3 (ref 140–450)
PLATELET # BLD AUTO: 392 10*3/MM3 (ref 140–450)
PLATELET # BLD AUTO: 400 10*3/MM3 (ref 140–450)
PLATELET # BLD AUTO: 404 10*3/MM3 (ref 140–450)
PLATELET # BLD AUTO: 407 10*3/MM3 (ref 140–450)
PLATELET # BLD AUTO: 412 10*3/MM3 (ref 140–450)
PLATELET # BLD AUTO: 412 10*3/MM3 (ref 140–450)
PLATELET # BLD AUTO: 420 10*3/MM3 (ref 140–450)
PLATELET # BLD AUTO: 427 10*3/MM3 (ref 140–450)
PLATELET # BLD AUTO: 436 10*3/MM3 (ref 140–450)
PLATELET # BLD AUTO: 448 10*3/MM3 (ref 140–450)
PLATELET # BLD AUTO: 451 10*3/MM3 (ref 140–450)
PLATELET # BLD AUTO: 457 10*3/MM3 (ref 140–450)
PLATELET # BLD AUTO: 497 10*3/MM3 (ref 140–450)
PLATELET # BLD AUTO: 498 10*3/MM3 (ref 140–450)
PLATELET # BLD AUTO: 538 10*3/MM3 (ref 140–450)
PLATELET # BLD AUTO: 547 10*3/MM3 (ref 140–450)
PLATELET # BLD AUTO: 587 10*3/MM3 (ref 140–450)
PLATELET # BLD AUTO: 626 10*3/MM3 (ref 140–450)
PMV BLD AUTO: 10.1 FL (ref 6–12)
PMV BLD AUTO: 7.2 FL (ref 6–12)
PMV BLD AUTO: 7.4 FL (ref 6–12)
PMV BLD AUTO: 7.5 FL (ref 6–12)
PMV BLD AUTO: 7.5 FL (ref 6–12)
PMV BLD AUTO: 7.6 FL (ref 6–12)
PMV BLD AUTO: 7.7 FL (ref 6–12)
PMV BLD AUTO: 7.8 FL (ref 6–12)
PMV BLD AUTO: 7.9 FL (ref 6–12)
PMV BLD AUTO: 8 FL (ref 6–12)
PMV BLD AUTO: 8.1 FL (ref 6–12)
PMV BLD AUTO: 8.2 FL (ref 6–12)
PMV BLD AUTO: 8.3 FL (ref 6–12)
PMV BLD AUTO: 8.4 FL (ref 6–12)
PMV BLD AUTO: 8.5 FL (ref 6–12)
PMV BLD AUTO: 8.5 FL (ref 6–12)
PMV BLD AUTO: 8.6 FL (ref 6–12)
PMV BLD AUTO: 8.7 FL (ref 6–12)
PMV BLD AUTO: 8.7 FL (ref 6–12)
PMV BLD AUTO: 8.8 FL (ref 6–12)
PMV BLD AUTO: 8.9 FL (ref 6–12)
PO2 BLDA: 72.6 MM HG (ref 83–108)
PO2 BLDA: 86.5 MM HG (ref 83–108)
PO2 BLDA: 97.8 MM HG (ref 83–108)
POIKILOCYTOSIS BLD QL SMEAR: ABNORMAL
POIKILOCYTOSIS BLD QL SMEAR: ABNORMAL
POTASSIUM SERPL-SCNC: 3 MMOL/L (ref 3.5–5.2)
POTASSIUM SERPL-SCNC: 3.2 MMOL/L (ref 3.5–5.2)
POTASSIUM SERPL-SCNC: 3.3 MMOL/L (ref 3.5–5.2)
POTASSIUM SERPL-SCNC: 3.4 MMOL/L (ref 3.5–5.2)
POTASSIUM SERPL-SCNC: 3.5 MMOL/L (ref 3.5–5.2)
POTASSIUM SERPL-SCNC: 3.6 MMOL/L (ref 3.5–5.2)
POTASSIUM SERPL-SCNC: 3.7 MMOL/L (ref 3.5–5.2)
POTASSIUM SERPL-SCNC: 3.7 MMOL/L (ref 3.5–5.2)
POTASSIUM SERPL-SCNC: 3.8 MMOL/L (ref 3.5–5.2)
POTASSIUM SERPL-SCNC: 3.8 MMOL/L (ref 3.5–5.2)
POTASSIUM SERPL-SCNC: 3.9 MMOL/L (ref 3.5–5.2)
POTASSIUM SERPL-SCNC: 3.9 MMOL/L (ref 3.5–5.2)
POTASSIUM SERPL-SCNC: 4 MMOL/L (ref 3.5–5.2)
POTASSIUM SERPL-SCNC: 4.1 MMOL/L (ref 3.5–5.2)
POTASSIUM SERPL-SCNC: 4.2 MMOL/L (ref 3.5–5.2)
POTASSIUM SERPL-SCNC: 4.3 MMOL/L (ref 3.5–5.2)
POTASSIUM SERPL-SCNC: 4.4 MMOL/L (ref 3.5–5.2)
POTASSIUM SERPL-SCNC: 4.5 MMOL/L (ref 3.5–5.2)
POTASSIUM SERPL-SCNC: 4.6 MMOL/L (ref 3.5–5.2)
POTASSIUM SERPL-SCNC: 4.7 MMOL/L (ref 3.5–5.2)
POTASSIUM SERPL-SCNC: 4.8 MMOL/L (ref 3.5–5.2)
POTASSIUM SERPL-SCNC: 4.9 MMOL/L (ref 3.5–5.2)
POTASSIUM SERPL-SCNC: 4.9 MMOL/L (ref 3.5–5.2)
POTASSIUM SERPL-SCNC: 5 MMOL/L (ref 3.5–5.2)
POTASSIUM SERPL-SCNC: 5.1 MMOL/L (ref 3.5–5.2)
POTASSIUM SERPL-SCNC: 5.6 MMOL/L (ref 3.5–5.2)
POTASSIUM SERPL-SCNC: 5.7 MMOL/L (ref 3.5–5.2)
PROCALCITONIN SERPL-MCNC: 0.08 NG/ML (ref 0–0.25)
PROT SERPL-MCNC: 5.4 G/DL (ref 6–8.5)
PROT SERPL-MCNC: 5.6 G/DL (ref 6–8.5)
PROT SERPL-MCNC: 5.8 G/DL (ref 6–8.5)
PROT SERPL-MCNC: 5.8 G/DL (ref 6–8.5)
PROT SERPL-MCNC: 5.9 G/DL (ref 6–8.5)
PROT SERPL-MCNC: 5.9 G/DL (ref 6–8.5)
PROT SERPL-MCNC: 6 G/DL (ref 6–8.5)
PROT SERPL-MCNC: 6.2 G/DL (ref 6–8.5)
PROT SERPL-MCNC: 6.2 G/DL (ref 6–8.5)
PROT SERPL-MCNC: 6.3 G/DL (ref 6–8.5)
PROT SERPL-MCNC: 6.6 G/DL (ref 6–8.5)
PROT SERPL-MCNC: 6.6 G/DL (ref 6–8.5)
PROT SERPL-MCNC: 6.8 G/DL (ref 6–8.5)
PROT SERPL-MCNC: 6.9 G/DL (ref 6–8.5)
PROT SERPL-MCNC: 7.1 G/DL (ref 6–8.5)
PROT SERPL-MCNC: 8 G/DL (ref 6–8.5)
PROT UR QL STRIP: ABNORMAL
PROT UR QL STRIP: NEGATIVE
PROTHROMBIN TIME: 10.8 SECONDS (ref 9.6–11.7)
PROTHROMBIN TIME: 11.2 SECONDS (ref 9.6–11.7)
PROTHROMBIN TIME: 12.4 SECONDS (ref 9.6–11.7)
QT INTERVAL: 443 MS
QT INTERVAL: 448 MS
QT INTERVAL: 457 MS
QT INTERVAL: 458 MS
QT INTERVAL: 461 MS
QT INTERVAL: 469 MS
QT INTERVAL: 479 MS
QT INTERVAL: 482 MS
QT INTERVAL: 501 MS
RBC # BLD AUTO: 2.64 10*6/MM3 (ref 4.14–5.8)
RBC # BLD AUTO: 2.7 10*6/MM3 (ref 4.14–5.8)
RBC # BLD AUTO: 2.71 10*6/MM3 (ref 4.14–5.8)
RBC # BLD AUTO: 2.72 10*6/MM3 (ref 4.14–5.8)
RBC # BLD AUTO: 2.89 10*6/MM3 (ref 4.14–5.8)
RBC # BLD AUTO: 2.91 10*6/MM3 (ref 4.14–5.8)
RBC # BLD AUTO: 2.93 10*6/MM3 (ref 4.14–5.8)
RBC # BLD AUTO: 3 10*6/MM3 (ref 4.14–5.8)
RBC # BLD AUTO: 3.04 10*6/MM3 (ref 4.14–5.8)
RBC # BLD AUTO: 3.05 10*6/MM3 (ref 4.14–5.8)
RBC # BLD AUTO: 3.12 10*6/MM3 (ref 4.14–5.8)
RBC # BLD AUTO: 3.13 10*6/MM3 (ref 4.14–5.8)
RBC # BLD AUTO: 3.16 10*6/MM3 (ref 4.14–5.8)
RBC # BLD AUTO: 3.29 10*6/MM3 (ref 4.14–5.8)
RBC # BLD AUTO: 3.33 10*6/MM3 (ref 4.14–5.8)
RBC # BLD AUTO: 3.35 10*6/MM3 (ref 4.14–5.8)
RBC # BLD AUTO: 3.37 10*6/MM3 (ref 4.14–5.8)
RBC # BLD AUTO: 3.39 10*6/MM3 (ref 4.14–5.8)
RBC # BLD AUTO: 3.43 10*6/MM3 (ref 4.14–5.8)
RBC # BLD AUTO: 3.45 10*6/MM3 (ref 4.14–5.8)
RBC # BLD AUTO: 3.52 10*6/MM3 (ref 4.14–5.8)
RBC # BLD AUTO: 3.6 10*6/MM3 (ref 4.14–5.8)
RBC # BLD AUTO: 3.61 10*6/MM3 (ref 4.14–5.8)
RBC # BLD AUTO: 3.61 10*6/MM3 (ref 4.14–5.8)
RBC # BLD AUTO: 3.66 10*6/MM3 (ref 4.14–5.8)
RBC # BLD AUTO: 3.69 10*6/MM3 (ref 4.14–5.8)
RBC # BLD AUTO: 3.75 10*6/MM3 (ref 4.14–5.8)
RBC # BLD AUTO: 3.75 10*6/MM3 (ref 4.14–5.8)
RBC # BLD AUTO: 3.77 10*6/MM3 (ref 4.14–5.8)
RBC # BLD AUTO: 3.77 10*6/MM3 (ref 4.14–5.8)
RBC # BLD AUTO: 3.78 10*6/MM3 (ref 4.14–5.8)
RBC # BLD AUTO: 3.83 10*6/MM3 (ref 4.14–5.8)
RBC # BLD AUTO: 3.84 10*6/MM3 (ref 4.14–5.8)
RBC # BLD AUTO: 3.84 10*6/MM3 (ref 4.14–5.8)
RBC # BLD AUTO: 3.93 10*6/MM3 (ref 4.14–5.8)
RBC # BLD AUTO: 3.96 10*6/MM3 (ref 4.14–5.8)
RBC # BLD AUTO: 3.98 10*6/MM3 (ref 4.14–5.8)
RBC # BLD AUTO: 4.04 10*6/MM3 (ref 4.14–5.8)
RBC # BLD AUTO: 4.07 10*6/MM3 (ref 4.14–5.8)
RBC # BLD AUTO: 4.07 10*6/MM3 (ref 4.14–5.8)
RBC # BLD AUTO: 4.1 10*6/MM3 (ref 4.14–5.8)
RBC # BLD AUTO: 4.25 10*6/MM3 (ref 4.14–5.8)
RBC # BLD AUTO: 4.28 10*6/MM3 (ref 4.14–5.8)
RBC # BLD AUTO: 4.3 10*6/MM3 (ref 4.14–5.8)
RBC # BLD AUTO: 4.33 10*6/MM3 (ref 4.14–5.8)
RBC # BLD AUTO: 4.37 10*6/MM3 (ref 4.14–5.8)
RBC # BLD AUTO: 4.4 10*6/MM3 (ref 4.14–5.8)
RBC # BLD AUTO: 4.43 10*6/MM3 (ref 4.14–5.8)
RBC # BLD AUTO: 4.47 10*6/MM3 (ref 4.14–5.8)
RBC # BLD AUTO: 4.47 10*6/MM3 (ref 4.14–5.8)
RBC # BLD AUTO: 4.48 10*6/MM3 (ref 4.14–5.8)
RBC # BLD AUTO: 4.56 10*6/MM3 (ref 4.14–5.8)
RBC # BLD AUTO: 4.58 10*6/MM3 (ref 4.14–5.8)
RBC # BLD AUTO: 4.66 10*6/MM3 (ref 4.14–5.8)
RBC # BLD AUTO: 4.74 10*6/MM3 (ref 4.14–5.8)
RBC # BLD AUTO: 4.91 10*6/MM3 (ref 4.14–5.8)
RBC # UR STRIP: ABNORMAL /HPF
REF LAB TEST METHOD: ABNORMAL
REF LAB TEST METHOD: NORMAL
REF LAB TEST METHOD: NORMAL
RH BLD: POSITIVE
RHINOVIRUS RNA SPEC NAA+PROBE: NOT DETECTED
RSV RNA NPH QL NAA+NON-PROBE: NOT DETECTED
S PNEUM AG SPEC QL LA: NEGATIVE
SAO2 % BLDCOA: 94.9 % (ref 94–98)
SAO2 % BLDCOA: 96.2 % (ref 94–98)
SAO2 % BLDCOA: 96.9 % (ref 94–98)
SARS-COV-2 RNA NPH QL NAA+NON-PROBE: NOT DETECTED
SARS-COV-2 RNA PNL SPEC NAA+PROBE: NOT DETECTED
SARS-COV-2 RNA PNL SPEC NAA+PROBE: NOT DETECTED
SARS-COV-2 RNA RESP QL NAA+PROBE: NOT DETECTED
SCAN SLIDE: NORMAL
SCAN SLIDE: NORMAL
SODIUM SERPL-SCNC: 129 MMOL/L (ref 136–145)
SODIUM SERPL-SCNC: 129 MMOL/L (ref 136–145)
SODIUM SERPL-SCNC: 131 MMOL/L (ref 136–145)
SODIUM SERPL-SCNC: 132 MMOL/L (ref 136–145)
SODIUM SERPL-SCNC: 133 MMOL/L (ref 136–145)
SODIUM SERPL-SCNC: 133 MMOL/L (ref 136–145)
SODIUM SERPL-SCNC: 134 MMOL/L (ref 136–145)
SODIUM SERPL-SCNC: 135 MMOL/L (ref 136–145)
SODIUM SERPL-SCNC: 136 MMOL/L (ref 136–145)
SODIUM SERPL-SCNC: 137 MMOL/L (ref 136–145)
SODIUM SERPL-SCNC: 138 MMOL/L (ref 136–145)
SODIUM SERPL-SCNC: 139 MMOL/L (ref 136–145)
SODIUM UR-SCNC: 40 MMOL/L
SP GR UR STRIP: 1.01 (ref 1–1.03)
SP GR UR STRIP: 1.02 (ref 1–1.03)
SQUAMOUS #/AREA URNS HPF: ABNORMAL /HPF
STRESS BASELINE BP: NORMAL MMHG
STRESS BASELINE HR: 71 BPM
STRESS PERCENT HR: 59 %
STRESS POST PEAK BP: NORMAL MMHG
STRESS POST PEAK HR: 72 BPM
STRESS TARGET HR: 122 BPM
STRESS TARGET HR: 122 BPM
STRESS TARGET HR: 123 BPM
T&S EXPIRATION DATE: NORMAL
T4 FREE SERPL-MCNC: 1.09 NG/DL (ref 0.93–1.7)
TIBC SERPL-MCNC: 408 MCG/DL (ref 298–536)
TIBC SERPL-MCNC: 519 MCG/DL (ref 298–536)
TRANSFERRIN SERPL-MCNC: 274 MG/DL (ref 200–360)
TRANSFERRIN SERPL-MCNC: 348 MG/DL (ref 200–360)
TROPONIN T SERPL-MCNC: 0.02 NG/ML (ref 0–0.03)
TROPONIN T SERPL-MCNC: 0.04 NG/ML (ref 0–0.03)
TROPONIN T SERPL-MCNC: <0.01 NG/ML (ref 0–0.03)
TSH SERPL DL<=0.05 MIU/L-ACNC: 5.95 UIU/ML (ref 0.27–4.2)
UNIT  ABO: NORMAL
UNIT  RH: NORMAL
URATE SERPL-MCNC: 7 MG/DL (ref 3.4–7)
UROBILINOGEN UR QL STRIP: ABNORMAL
VANCOMYCIN PEAK SERPL-MCNC: 15.7 MCG/ML (ref 20–40)
VANCOMYCIN PEAK SERPL-MCNC: 32.1 MCG/ML (ref 20–40)
VANCOMYCIN PEAK SERPL-MCNC: 37.7 MCG/ML (ref 20–40)
VANCOMYCIN PEAK SERPL-MCNC: 39.9 MCG/ML (ref 20–40)
VANCOMYCIN SERPL-MCNC: 11.9 MCG/ML (ref 5–40)
VANCOMYCIN SERPL-MCNC: 15.5 MCG/ML (ref 5–40)
VANCOMYCIN SERPL-MCNC: 15.9 MCG/ML (ref 5–40)
VANCOMYCIN SERPL-MCNC: 16.2 MCG/ML (ref 5–40)
VANCOMYCIN SERPL-MCNC: 16.3 MCG/ML (ref 5–40)
VANCOMYCIN SERPL-MCNC: 25 MCG/ML (ref 5–40)
VANCOMYCIN SERPL-MCNC: 26.4 MCG/ML (ref 5–40)
VANCOMYCIN SERPL-MCNC: 33.3 MCG/ML (ref 5–40)
VANCOMYCIN TROUGH SERPL-MCNC: 10.9 MCG/ML (ref 5–20)
VANCOMYCIN TROUGH SERPL-MCNC: 11.9 MCG/ML (ref 5–20)
VANCOMYCIN TROUGH SERPL-MCNC: 12.4 MCG/ML (ref 5–20)
VARIANT LYMPHS NFR BLD MANUAL: 10 % (ref 19.6–45.3)
VARIANT LYMPHS NFR BLD MANUAL: 13 % (ref 19.6–45.3)
VARIANT LYMPHS NFR BLD MANUAL: 2 % (ref 0–5)
VARIANT LYMPHS NFR BLD MANUAL: 2 % (ref 19.6–45.3)
VARIANT LYMPHS NFR BLD MANUAL: 9 % (ref 19.6–45.3)
WBC # UR STRIP: ABNORMAL /HPF
WBC MORPH BLD: NORMAL
WBC NRBC COR # BLD: 10.1 10*3/MM3 (ref 3.4–10.8)
WBC NRBC COR # BLD: 10.1 10*3/MM3 (ref 3.4–10.8)
WBC NRBC COR # BLD: 10.2 10*3/MM3 (ref 3.4–10.8)
WBC NRBC COR # BLD: 10.2 10*3/MM3 (ref 3.4–10.8)
WBC NRBC COR # BLD: 10.3 10*3/MM3 (ref 3.4–10.8)
WBC NRBC COR # BLD: 10.6 10*3/MM3 (ref 3.4–10.8)
WBC NRBC COR # BLD: 10.8 10*3/MM3 (ref 3.4–10.8)
WBC NRBC COR # BLD: 10.8 10*3/MM3 (ref 3.4–10.8)
WBC NRBC COR # BLD: 11 10*3/MM3 (ref 3.4–10.8)
WBC NRBC COR # BLD: 11.1 10*3/MM3 (ref 3.4–10.8)
WBC NRBC COR # BLD: 11.6 10*3/MM3 (ref 3.4–10.8)
WBC NRBC COR # BLD: 11.7 10*3/MM3 (ref 3.4–10.8)
WBC NRBC COR # BLD: 11.9 10*3/MM3 (ref 3.4–10.8)
WBC NRBC COR # BLD: 12.2 10*3/MM3 (ref 3.4–10.8)
WBC NRBC COR # BLD: 12.3 10*3/MM3 (ref 3.4–10.8)
WBC NRBC COR # BLD: 12.4 10*3/MM3 (ref 3.4–10.8)
WBC NRBC COR # BLD: 13.7 10*3/MM3 (ref 3.4–10.8)
WBC NRBC COR # BLD: 14.1 10*3/MM3 (ref 3.4–10.8)
WBC NRBC COR # BLD: 14.3 10*3/MM3 (ref 3.4–10.8)
WBC NRBC COR # BLD: 14.5 10*3/MM3 (ref 3.4–10.8)
WBC NRBC COR # BLD: 15.2 10*3/MM3 (ref 3.4–10.8)
WBC NRBC COR # BLD: 15.4 10*3/MM3 (ref 3.4–10.8)
WBC NRBC COR # BLD: 15.5 10*3/MM3 (ref 3.4–10.8)
WBC NRBC COR # BLD: 15.8 10*3/MM3 (ref 3.4–10.8)
WBC NRBC COR # BLD: 17.6 10*3/MM3 (ref 3.4–10.8)
WBC NRBC COR # BLD: 20.1 10*3/MM3 (ref 3.4–10.8)
WBC NRBC COR # BLD: 5.8 10*3/MM3 (ref 3.4–10.8)
WBC NRBC COR # BLD: 5.9 10*3/MM3 (ref 3.4–10.8)
WBC NRBC COR # BLD: 6 10*3/MM3 (ref 3.4–10.8)
WBC NRBC COR # BLD: 6.09 10*3/MM3 (ref 3.4–10.8)
WBC NRBC COR # BLD: 6.4 10*3/MM3 (ref 3.4–10.8)
WBC NRBC COR # BLD: 6.5 10*3/MM3 (ref 3.4–10.8)
WBC NRBC COR # BLD: 6.7 10*3/MM3 (ref 3.4–10.8)
WBC NRBC COR # BLD: 6.7 10*3/MM3 (ref 3.4–10.8)
WBC NRBC COR # BLD: 6.9 10*3/MM3 (ref 3.4–10.8)
WBC NRBC COR # BLD: 7 10*3/MM3 (ref 3.4–10.8)
WBC NRBC COR # BLD: 7.2 10*3/MM3 (ref 3.4–10.8)
WBC NRBC COR # BLD: 7.5 10*3/MM3 (ref 3.4–10.8)
WBC NRBC COR # BLD: 7.5 10*3/MM3 (ref 3.4–10.8)
WBC NRBC COR # BLD: 7.6 10*3/MM3 (ref 3.4–10.8)
WBC NRBC COR # BLD: 7.6 10*3/MM3 (ref 3.4–10.8)
WBC NRBC COR # BLD: 7.7 10*3/MM3 (ref 3.4–10.8)
WBC NRBC COR # BLD: 7.8 10*3/MM3 (ref 3.4–10.8)
WBC NRBC COR # BLD: 8.2 10*3/MM3 (ref 3.4–10.8)
WBC NRBC COR # BLD: 8.3 10*3/MM3 (ref 3.4–10.8)
WBC NRBC COR # BLD: 8.5 10*3/MM3 (ref 3.4–10.8)
WBC NRBC COR # BLD: 8.6 10*3/MM3 (ref 3.4–10.8)
WBC NRBC COR # BLD: 8.7 10*3/MM3 (ref 3.4–10.8)
WBC NRBC COR # BLD: 9 10*3/MM3 (ref 3.4–10.8)
WBC NRBC COR # BLD: 9.3 10*3/MM3 (ref 3.4–10.8)
WBC NRBC COR # BLD: 9.4 10*3/MM3 (ref 3.4–10.8)
WBC NRBC COR # BLD: 9.5 10*3/MM3 (ref 3.4–10.8)
WBC NRBC COR # BLD: 9.6 10*3/MM3 (ref 3.4–10.8)
WHOLE BLOOD HOLD COAG: NORMAL
WHOLE BLOOD HOLD SPECIMEN: NORMAL

## 2022-01-01 PROCEDURE — 82962 GLUCOSE BLOOD TEST: CPT

## 2022-01-01 PROCEDURE — A9270 NON-COVERED ITEM OR SERVICE: HCPCS | Performed by: INTERNAL MEDICINE

## 2022-01-01 PROCEDURE — 36415 COLL VENOUS BLD VENIPUNCTURE: CPT | Performed by: EMERGENCY MEDICINE

## 2022-01-01 PROCEDURE — 94799 UNLISTED PULMONARY SVC/PX: CPT

## 2022-01-01 PROCEDURE — 25010000002 PROPOFOL 200 MG/20ML EMULSION

## 2022-01-01 PROCEDURE — 73070 X-RAY EXAM OF ELBOW: CPT

## 2022-01-01 PROCEDURE — 25010000002 CEFEPIME PER 500 MG: Performed by: NURSE PRACTITIONER

## 2022-01-01 PROCEDURE — 63710000001 RANOLAZINE 500 MG TABLET SUSTAINED-RELEASE 12 HOUR: Performed by: NURSE PRACTITIONER

## 2022-01-01 PROCEDURE — 83735 ASSAY OF MAGNESIUM: CPT | Performed by: INTERNAL MEDICINE

## 2022-01-01 PROCEDURE — 25010000002 FUROSEMIDE PER 20 MG: Performed by: FAMILY MEDICINE

## 2022-01-01 PROCEDURE — 85025 COMPLETE CBC W/AUTO DIFF WBC: CPT

## 2022-01-01 PROCEDURE — 63710000001 INSULIN LISPRO (HUMAN) PER 5 UNITS: Performed by: INTERNAL MEDICINE

## 2022-01-01 PROCEDURE — 63710000001 INSULIN REGULAR HUMAN PER 5 UNITS: Performed by: INTERNAL MEDICINE

## 2022-01-01 PROCEDURE — 25010000002 CEFTRIAXONE PER 250 MG: Performed by: INTERNAL MEDICINE

## 2022-01-01 PROCEDURE — 80053 COMPREHEN METABOLIC PANEL: CPT | Performed by: EMERGENCY MEDICINE

## 2022-01-01 PROCEDURE — G0378 HOSPITAL OBSERVATION PER HR: HCPCS

## 2022-01-01 PROCEDURE — A9270 NON-COVERED ITEM OR SERVICE: HCPCS | Performed by: NURSE PRACTITIONER

## 2022-01-01 PROCEDURE — 84100 ASSAY OF PHOSPHORUS: CPT | Performed by: INTERNAL MEDICINE

## 2022-01-01 PROCEDURE — 25010000002 HYDROMORPHONE 1 MG/ML SOLUTION: Performed by: FAMILY MEDICINE

## 2022-01-01 PROCEDURE — 63710000001 INSULIN LISPRO (HUMAN) PER 5 UNITS: Performed by: ORTHOPAEDIC SURGERY

## 2022-01-01 PROCEDURE — 63710000001 FAMOTIDINE 20 MG TABLET: Performed by: INTERNAL MEDICINE

## 2022-01-01 PROCEDURE — 80048 BASIC METABOLIC PNL TOTAL CA: CPT | Performed by: INTERNAL MEDICINE

## 2022-01-01 PROCEDURE — 25010000002 AMPICILLIN-SULBACTAM PER 1.5 G: Performed by: INTERNAL MEDICINE

## 2022-01-01 PROCEDURE — 97110 THERAPEUTIC EXERCISES: CPT

## 2022-01-01 PROCEDURE — G0463 HOSPITAL OUTPT CLINIC VISIT: HCPCS

## 2022-01-01 PROCEDURE — 94664 DEMO&/EVAL PT USE INHALER: CPT

## 2022-01-01 PROCEDURE — 25010000002 METHYLPREDNISOLONE PER 125 MG: Performed by: NURSE PRACTITIONER

## 2022-01-01 PROCEDURE — 85025 COMPLETE CBC W/AUTO DIFF WBC: CPT | Performed by: INTERNAL MEDICINE

## 2022-01-01 PROCEDURE — 93010 ELECTROCARDIOGRAM REPORT: CPT | Performed by: INTERNAL MEDICINE

## 2022-01-01 PROCEDURE — 96375 TX/PRO/DX INJ NEW DRUG ADDON: CPT

## 2022-01-01 PROCEDURE — 94761 N-INVAS EAR/PLS OXIMETRY MLT: CPT

## 2022-01-01 PROCEDURE — 63710000001 INSULIN GLARGINE PER 5 UNITS: Performed by: ORTHOPAEDIC SURGERY

## 2022-01-01 PROCEDURE — 94760 N-INVAS EAR/PLS OXIMETRY 1: CPT

## 2022-01-01 PROCEDURE — 93005 ELECTROCARDIOGRAM TRACING: CPT | Performed by: FAMILY MEDICINE

## 2022-01-01 PROCEDURE — 63710000001 LISINOPRIL 5 MG TABLET: Performed by: NURSE PRACTITIONER

## 2022-01-01 PROCEDURE — 85025 COMPLETE CBC W/AUTO DIFF WBC: CPT | Performed by: NURSE PRACTITIONER

## 2022-01-01 PROCEDURE — 25010000002 PIPERACILLIN SOD-TAZOBACTAM PER 1 G: Performed by: EMERGENCY MEDICINE

## 2022-01-01 PROCEDURE — 80053 COMPREHEN METABOLIC PANEL: CPT | Performed by: INTERNAL MEDICINE

## 2022-01-01 PROCEDURE — 25010000002 ONDANSETRON PER 1 MG: Performed by: NURSE PRACTITIONER

## 2022-01-01 PROCEDURE — 63710000001 PREDNISONE PER 1 MG: Performed by: INTERNAL MEDICINE

## 2022-01-01 PROCEDURE — 99232 SBSQ HOSP IP/OBS MODERATE 35: CPT | Performed by: ORTHOPAEDIC SURGERY

## 2022-01-01 PROCEDURE — C1729 CATH, DRAINAGE: HCPCS

## 2022-01-01 PROCEDURE — 63710000001 INSULIN LISPRO (HUMAN) PER 5 UNITS: Performed by: FAMILY MEDICINE

## 2022-01-01 PROCEDURE — 63710000001 INSULIN LISPRO (HUMAN) PER 5 UNITS: Performed by: NURSE PRACTITIONER

## 2022-01-01 PROCEDURE — 85025 COMPLETE CBC W/AUTO DIFF WBC: CPT | Performed by: ORTHOPAEDIC SURGERY

## 2022-01-01 PROCEDURE — 25010000002 KETOROLAC TROMETHAMINE PER 15 MG

## 2022-01-01 PROCEDURE — 80048 BASIC METABOLIC PNL TOTAL CA: CPT

## 2022-01-01 PROCEDURE — 25010000002 CEFTRIAXONE PER 250 MG: Performed by: NURSE PRACTITIONER

## 2022-01-01 PROCEDURE — 63710000001 OXYCODONE 5 MG TABLET: Performed by: NURSE PRACTITIONER

## 2022-01-01 PROCEDURE — 25010000002 FENTANYL CITRATE (PF) 50 MCG/ML SOLUTION: Performed by: ANESTHESIOLOGY

## 2022-01-01 PROCEDURE — 63710000001 INSULIN GLARGINE PER 5 UNITS: Performed by: INTERNAL MEDICINE

## 2022-01-01 PROCEDURE — 86850 RBC ANTIBODY SCREEN: CPT | Performed by: EMERGENCY MEDICINE

## 2022-01-01 PROCEDURE — 85018 HEMOGLOBIN: CPT | Performed by: NURSE PRACTITIONER

## 2022-01-01 PROCEDURE — 25010000002 VANCOMYCIN 10 G RECONSTITUTED SOLUTION: Performed by: EMERGENCY MEDICINE

## 2022-01-01 PROCEDURE — 0 IOPAMIDOL PER 1 ML: Performed by: EMERGENCY MEDICINE

## 2022-01-01 PROCEDURE — 87449 NOS EACH ORGANISM AG IA: CPT | Performed by: NURSE PRACTITIONER

## 2022-01-01 PROCEDURE — 36415 COLL VENOUS BLD VENIPUNCTURE: CPT | Performed by: INTERNAL MEDICINE

## 2022-01-01 PROCEDURE — 25010000002 ONDANSETRON PER 1 MG: Performed by: FAMILY MEDICINE

## 2022-01-01 PROCEDURE — 63710000001 INSULIN GLARGINE PER 5 UNITS: Performed by: NURSE PRACTITIONER

## 2022-01-01 PROCEDURE — 94640 AIRWAY INHALATION TREATMENT: CPT

## 2022-01-01 PROCEDURE — 80048 BASIC METABOLIC PNL TOTAL CA: CPT | Performed by: NURSE PRACTITIONER

## 2022-01-01 PROCEDURE — 74018 RADEX ABDOMEN 1 VIEW: CPT

## 2022-01-01 PROCEDURE — 36415 COLL VENOUS BLD VENIPUNCTURE: CPT

## 2022-01-01 PROCEDURE — 87040 BLOOD CULTURE FOR BACTERIA: CPT | Performed by: FAMILY MEDICINE

## 2022-01-01 PROCEDURE — 36415 COLL VENOUS BLD VENIPUNCTURE: CPT | Performed by: NURSE PRACTITIONER

## 2022-01-01 PROCEDURE — 25010000002 HYDROMORPHONE 1 MG/ML SOLUTION: Performed by: INTERNAL MEDICINE

## 2022-01-01 PROCEDURE — 83036 HEMOGLOBIN GLYCOSYLATED A1C: CPT | Performed by: NURSE PRACTITIONER

## 2022-01-01 PROCEDURE — 84300 ASSAY OF URINE SODIUM: CPT | Performed by: INTERNAL MEDICINE

## 2022-01-01 PROCEDURE — 25010000002 CEFEPIME PER 500 MG: Performed by: EMERGENCY MEDICINE

## 2022-01-01 PROCEDURE — 84100 ASSAY OF PHOSPHORUS: CPT

## 2022-01-01 PROCEDURE — 85025 COMPLETE CBC W/AUTO DIFF WBC: CPT | Performed by: SURGERY

## 2022-01-01 PROCEDURE — 25010000002 ENOXAPARIN PER 10 MG: Performed by: FAMILY MEDICINE

## 2022-01-01 PROCEDURE — 83735 ASSAY OF MAGNESIUM: CPT

## 2022-01-01 PROCEDURE — 83690 ASSAY OF LIPASE: CPT | Performed by: EMERGENCY MEDICINE

## 2022-01-01 PROCEDURE — 80202 ASSAY OF VANCOMYCIN: CPT | Performed by: ORTHOPAEDIC SURGERY

## 2022-01-01 PROCEDURE — 73200 CT UPPER EXTREMITY W/O DYE: CPT

## 2022-01-01 PROCEDURE — 0 TECHNETIUM TETROFOSMIN KIT: Performed by: FAMILY MEDICINE

## 2022-01-01 PROCEDURE — 85025 COMPLETE CBC W/AUTO DIFF WBC: CPT | Performed by: EMERGENCY MEDICINE

## 2022-01-01 PROCEDURE — 25010000002 HYDROMORPHONE 1 MG/ML SOLUTION: Performed by: NURSE PRACTITIONER

## 2022-01-01 PROCEDURE — 93005 ELECTROCARDIOGRAM TRACING: CPT | Performed by: ORTHOPAEDIC SURGERY

## 2022-01-01 PROCEDURE — 83880 ASSAY OF NATRIURETIC PEPTIDE: CPT | Performed by: EMERGENCY MEDICINE

## 2022-01-01 PROCEDURE — 84484 ASSAY OF TROPONIN QUANT: CPT | Performed by: EMERGENCY MEDICINE

## 2022-01-01 PROCEDURE — 80053 COMPREHEN METABOLIC PANEL: CPT | Performed by: NURSE PRACTITIONER

## 2022-01-01 PROCEDURE — 71250 CT THORAX DX C-: CPT

## 2022-01-01 PROCEDURE — 25010000002 NA FERRIC GLUC CPLX PER 12.5 MG: Performed by: INTERNAL MEDICINE

## 2022-01-01 PROCEDURE — 86140 C-REACTIVE PROTEIN: CPT | Performed by: NURSE PRACTITIONER

## 2022-01-01 PROCEDURE — C1751 CATH, INF, PER/CENT/MIDLINE: HCPCS

## 2022-01-01 PROCEDURE — 63710000001 SUCRALFATE 1 G TABLET: Performed by: INTERNAL MEDICINE

## 2022-01-01 PROCEDURE — 99285 EMERGENCY DEPT VISIT HI MDM: CPT

## 2022-01-01 PROCEDURE — 0 LIDOCAINE 1 % SOLUTION: Performed by: RADIOLOGY

## 2022-01-01 PROCEDURE — 99214 OFFICE O/P EST MOD 30 MIN: CPT | Performed by: PHYSICAL MEDICINE & REHABILITATION

## 2022-01-01 PROCEDURE — 63710000001 PREDNISONE PER 5 MG: Performed by: INTERNAL MEDICINE

## 2022-01-01 PROCEDURE — 25010000002 HYDROMORPHONE 1 MG/ML SOLUTION

## 2022-01-01 PROCEDURE — 99211 OFF/OP EST MAY X REQ PHY/QHP: CPT

## 2022-01-01 PROCEDURE — 84484 ASSAY OF TROPONIN QUANT: CPT | Performed by: FAMILY MEDICINE

## 2022-01-01 PROCEDURE — 0W3P4ZZ CONTROL BLEEDING IN GASTROINTESTINAL TRACT, PERCUTANEOUS ENDOSCOPIC APPROACH: ICD-10-PCS | Performed by: INTERNAL MEDICINE

## 2022-01-01 PROCEDURE — 63710000001 DOCUSATE SODIUM 100 MG CAPSULE: Performed by: NURSE PRACTITIONER

## 2022-01-01 PROCEDURE — 97162 PT EVAL MOD COMPLEX 30 MIN: CPT

## 2022-01-01 PROCEDURE — 87040 BLOOD CULTURE FOR BACTERIA: CPT | Performed by: EMERGENCY MEDICINE

## 2022-01-01 PROCEDURE — 85027 COMPLETE CBC AUTOMATED: CPT | Performed by: INTERNAL MEDICINE

## 2022-01-01 PROCEDURE — 87641 MR-STAPH DNA AMP PROBE: CPT | Performed by: NURSE PRACTITIONER

## 2022-01-01 PROCEDURE — 63710000001 FUROSEMIDE 40 MG TABLET: Performed by: NURSE PRACTITIONER

## 2022-01-01 PROCEDURE — 63710000001 GLIPIZIDE 5 MG TABLET: Performed by: NURSE PRACTITIONER

## 2022-01-01 PROCEDURE — 83735 ASSAY OF MAGNESIUM: CPT | Performed by: FAMILY MEDICINE

## 2022-01-01 PROCEDURE — 25010000002 ONDANSETRON PER 1 MG: Performed by: EMERGENCY MEDICINE

## 2022-01-01 PROCEDURE — 85610 PROTHROMBIN TIME: CPT | Performed by: EMERGENCY MEDICINE

## 2022-01-01 PROCEDURE — U0003 INFECTIOUS AGENT DETECTION BY NUCLEIC ACID (DNA OR RNA); SEVERE ACUTE RESPIRATORY SYNDROME CORONAVIRUS 2 (SARS-COV-2) (CORONAVIRUS DISEASE [COVID-19]), AMPLIFIED PROBE TECHNIQUE, MAKING USE OF HIGH THROUGHPUT TECHNOLOGIES AS DESCRIBED BY CMS-2020-01-R: HCPCS | Performed by: EMERGENCY MEDICINE

## 2022-01-01 PROCEDURE — 25010000002 ONDANSETRON PER 1 MG

## 2022-01-01 PROCEDURE — 25010000002 METOCLOPRAMIDE PER 10 MG: Performed by: INTERNAL MEDICINE

## 2022-01-01 PROCEDURE — 99232 SBSQ HOSP IP/OBS MODERATE 35: CPT | Performed by: SURGERY

## 2022-01-01 PROCEDURE — 0 LIDOCAINE 1 % SOLUTION 10 ML VIAL: Performed by: ORTHOPAEDIC SURGERY

## 2022-01-01 PROCEDURE — 25010000002 VANCOMYCIN 750 MG RECONSTITUTED SOLUTION 1 EACH VIAL: Performed by: NURSE PRACTITIONER

## 2022-01-01 PROCEDURE — 80048 BASIC METABOLIC PNL TOTAL CA: CPT | Performed by: EMERGENCY MEDICINE

## 2022-01-01 PROCEDURE — 87635 SARS-COV-2 COVID-19 AMP PRB: CPT | Performed by: FAMILY MEDICINE

## 2022-01-01 PROCEDURE — 87070 CULTURE OTHR SPECIMN AEROBIC: CPT

## 2022-01-01 PROCEDURE — 80202 ASSAY OF VANCOMYCIN: CPT | Performed by: INTERNAL MEDICINE

## 2022-01-01 PROCEDURE — 63710000001 PREDNISONE PER 1 MG: Performed by: NURSE PRACTITIONER

## 2022-01-01 PROCEDURE — 97116 GAIT TRAINING THERAPY: CPT

## 2022-01-01 PROCEDURE — 92611 MOTION FLUOROSCOPY/SWALLOW: CPT

## 2022-01-01 PROCEDURE — 76942 ECHO GUIDE FOR BIOPSY: CPT

## 2022-01-01 PROCEDURE — 63710000001 METOLAZONE 5 MG TABLET: Performed by: NURSE PRACTITIONER

## 2022-01-01 PROCEDURE — 82550 ASSAY OF CK (CPK): CPT | Performed by: INTERNAL MEDICINE

## 2022-01-01 PROCEDURE — 99231 SBSQ HOSP IP/OBS SF/LOW 25: CPT | Performed by: SURGERY

## 2022-01-01 PROCEDURE — 71045 X-RAY EXAM CHEST 1 VIEW: CPT

## 2022-01-01 PROCEDURE — 0MB40ZZ EXCISION OF LEFT ELBOW BURSA AND LIGAMENT, OPEN APPROACH: ICD-10-PCS | Performed by: ORTHOPAEDIC SURGERY

## 2022-01-01 PROCEDURE — 83540 ASSAY OF IRON: CPT | Performed by: NURSE PRACTITIONER

## 2022-01-01 PROCEDURE — 63710000001 PREGABALIN 75 MG CAPSULE: Performed by: NURSE PRACTITIONER

## 2022-01-01 PROCEDURE — 87205 SMEAR GRAM STAIN: CPT | Performed by: NURSE PRACTITIONER

## 2022-01-01 PROCEDURE — 71275 CT ANGIOGRAPHY CHEST: CPT

## 2022-01-01 PROCEDURE — 85014 HEMATOCRIT: CPT | Performed by: FAMILY MEDICINE

## 2022-01-01 PROCEDURE — 0202U NFCT DS 22 TRGT SARS-COV-2: CPT | Performed by: EMERGENCY MEDICINE

## 2022-01-01 PROCEDURE — 87071 CULTURE AEROBIC QUANT OTHER: CPT | Performed by: INTERNAL MEDICINE

## 2022-01-01 PROCEDURE — 96376 TX/PRO/DX INJ SAME DRUG ADON: CPT

## 2022-01-01 PROCEDURE — 81001 URINALYSIS AUTO W/SCOPE: CPT

## 2022-01-01 PROCEDURE — 84132 ASSAY OF SERUM POTASSIUM: CPT | Performed by: FAMILY MEDICINE

## 2022-01-01 PROCEDURE — 87147 CULTURE TYPE IMMUNOLOGIC: CPT | Performed by: EMERGENCY MEDICINE

## 2022-01-01 PROCEDURE — 99024 POSTOP FOLLOW-UP VISIT: CPT | Performed by: ORTHOPAEDIC SURGERY

## 2022-01-01 PROCEDURE — 80048 BASIC METABOLIC PNL TOTAL CA: CPT | Performed by: FAMILY MEDICINE

## 2022-01-01 PROCEDURE — 87040 BLOOD CULTURE FOR BACTERIA: CPT

## 2022-01-01 PROCEDURE — 63710000001 ONDANSETRON PER 8 MG: Performed by: INTERNAL MEDICINE

## 2022-01-01 PROCEDURE — 80202 ASSAY OF VANCOMYCIN: CPT | Performed by: FAMILY MEDICINE

## 2022-01-01 PROCEDURE — 25010000002 ONDANSETRON PER 1 MG: Performed by: INTERNAL MEDICINE

## 2022-01-01 PROCEDURE — 25010000002 VANCOMYCIN 10 G RECONSTITUTED SOLUTION

## 2022-01-01 PROCEDURE — 25010000002 REGADENOSON 0.4 MG/5ML SOLUTION: Performed by: FAMILY MEDICINE

## 2022-01-01 PROCEDURE — 25010000002 CEFTRIAXONE PER 250 MG: Performed by: EMERGENCY MEDICINE

## 2022-01-01 PROCEDURE — 85007 BL SMEAR W/DIFF WBC COUNT: CPT | Performed by: NURSE PRACTITIONER

## 2022-01-01 PROCEDURE — 25010000002 PROPOFOL 200 MG/20ML EMULSION: Performed by: ANESTHESIOLOGY

## 2022-01-01 PROCEDURE — 84484 ASSAY OF TROPONIN QUANT: CPT | Performed by: INTERNAL MEDICINE

## 2022-01-01 PROCEDURE — 99222 1ST HOSP IP/OBS MODERATE 55: CPT | Performed by: SURGERY

## 2022-01-01 PROCEDURE — 93279 PRGRMG DEV EVAL PM/LDLS PM: CPT | Performed by: INTERNAL MEDICINE

## 2022-01-01 PROCEDURE — 80048 BASIC METABOLIC PNL TOTAL CA: CPT | Performed by: SURGERY

## 2022-01-01 PROCEDURE — 85652 RBC SED RATE AUTOMATED: CPT | Performed by: NURSE PRACTITIONER

## 2022-01-01 PROCEDURE — 0202U NFCT DS 22 TRGT SARS-COV-2: CPT | Performed by: INTERNAL MEDICINE

## 2022-01-01 PROCEDURE — 86900 BLOOD TYPING SEROLOGIC ABO: CPT

## 2022-01-01 PROCEDURE — 85652 RBC SED RATE AUTOMATED: CPT | Performed by: INTERNAL MEDICINE

## 2022-01-01 PROCEDURE — 85651 RBC SED RATE NONAUTOMATED: CPT

## 2022-01-01 PROCEDURE — 25010000002 VANCOMYCIN 10 G RECONSTITUTED SOLUTION: Performed by: INTERNAL MEDICINE

## 2022-01-01 PROCEDURE — 36415 COLL VENOUS BLD VENIPUNCTURE: CPT | Performed by: ORTHOPAEDIC SURGERY

## 2022-01-01 PROCEDURE — 25010000002 MIDAZOLAM PER 1 MG: Performed by: ANESTHESIOLOGY

## 2022-01-01 PROCEDURE — 99284 EMERGENCY DEPT VISIT MOD MDM: CPT

## 2022-01-01 PROCEDURE — 93018 CV STRESS TEST I&R ONLY: CPT | Performed by: INTERNAL MEDICINE

## 2022-01-01 PROCEDURE — 0RJM3ZZ INSPECTION OF LEFT ELBOW JOINT, PERCUTANEOUS APPROACH: ICD-10-PCS | Performed by: RADIOLOGY

## 2022-01-01 PROCEDURE — 63710000001 LIDOCAINE VISCOUS HCL 2 % SOLUTION 15 ML CUP: Performed by: NURSE PRACTITIONER

## 2022-01-01 PROCEDURE — 96374 THER/PROPH/DIAG INJ IV PUSH: CPT

## 2022-01-01 PROCEDURE — 85014 HEMATOCRIT: CPT | Performed by: NURSE PRACTITIONER

## 2022-01-01 PROCEDURE — 86900 BLOOD TYPING SEROLOGIC ABO: CPT | Performed by: EMERGENCY MEDICINE

## 2022-01-01 PROCEDURE — 81001 URINALYSIS AUTO W/SCOPE: CPT | Performed by: EMERGENCY MEDICINE

## 2022-01-01 PROCEDURE — 80202 ASSAY OF VANCOMYCIN: CPT | Performed by: NURSE PRACTITIONER

## 2022-01-01 PROCEDURE — P9016 RBC LEUKOCYTES REDUCED: HCPCS

## 2022-01-01 PROCEDURE — 87205 SMEAR GRAM STAIN: CPT | Performed by: INTERNAL MEDICINE

## 2022-01-01 PROCEDURE — 92526 ORAL FUNCTION THERAPY: CPT

## 2022-01-01 PROCEDURE — 93306 TTE W/DOPPLER COMPLETE: CPT | Performed by: INTERNAL MEDICINE

## 2022-01-01 PROCEDURE — 93005 ELECTROCARDIOGRAM TRACING: CPT

## 2022-01-01 PROCEDURE — 25010000002 MAGNESIUM SULFATE 2 GM/50ML SOLUTION

## 2022-01-01 PROCEDURE — 85027 COMPLETE CBC AUTOMATED: CPT | Performed by: NURSE PRACTITIONER

## 2022-01-01 PROCEDURE — 25010000002 KETOROLAC TROMETHAMINE PER 15 MG: Performed by: EMERGENCY MEDICINE

## 2022-01-01 PROCEDURE — 78452 HT MUSCLE IMAGE SPECT MULT: CPT

## 2022-01-01 PROCEDURE — 88305 TISSUE EXAM BY PATHOLOGIST: CPT | Performed by: INTERNAL MEDICINE

## 2022-01-01 PROCEDURE — 36600 WITHDRAWAL OF ARTERIAL BLOOD: CPT

## 2022-01-01 PROCEDURE — 87206 SMEAR FLUORESCENT/ACID STAI: CPT | Performed by: INTERNAL MEDICINE

## 2022-01-01 PROCEDURE — 86140 C-REACTIVE PROTEIN: CPT

## 2022-01-01 PROCEDURE — 36430 TRANSFUSION BLD/BLD COMPNT: CPT

## 2022-01-01 PROCEDURE — C9803 HOPD COVID-19 SPEC COLLECT: HCPCS

## 2022-01-01 PROCEDURE — 82565 ASSAY OF CREATININE: CPT | Performed by: NURSE PRACTITIONER

## 2022-01-01 PROCEDURE — 24105 EXCISION OLECRANON BURSA: CPT | Performed by: ORTHOPAEDIC SURGERY

## 2022-01-01 PROCEDURE — 86901 BLOOD TYPING SEROLOGIC RH(D): CPT | Performed by: EMERGENCY MEDICINE

## 2022-01-01 PROCEDURE — 87077 CULTURE AEROBIC IDENTIFY: CPT

## 2022-01-01 PROCEDURE — 93306 TTE W/DOPPLER COMPLETE: CPT

## 2022-01-01 PROCEDURE — 83605 ASSAY OF LACTIC ACID: CPT

## 2022-01-01 PROCEDURE — 85027 COMPLETE CBC AUTOMATED: CPT | Performed by: ORTHOPAEDIC SURGERY

## 2022-01-01 PROCEDURE — 25010000002 METOCLOPRAMIDE PER 10 MG: Performed by: NURSE PRACTITIONER

## 2022-01-01 PROCEDURE — 82803 BLOOD GASES ANY COMBINATION: CPT

## 2022-01-01 PROCEDURE — 85025 COMPLETE CBC W/AUTO DIFF WBC: CPT | Performed by: FAMILY MEDICINE

## 2022-01-01 PROCEDURE — 85730 THROMBOPLASTIN TIME PARTIAL: CPT | Performed by: EMERGENCY MEDICINE

## 2022-01-01 PROCEDURE — 25010000002 VANCOMYCIN 10 G RECONSTITUTED SOLUTION: Performed by: NURSE PRACTITIONER

## 2022-01-01 PROCEDURE — 25010000002 METHYLPREDNISOLONE PER 125 MG: Performed by: EMERGENCY MEDICINE

## 2022-01-01 PROCEDURE — 25010000002 LORAZEPAM PER 2 MG

## 2022-01-01 PROCEDURE — 86140 C-REACTIVE PROTEIN: CPT | Performed by: INTERNAL MEDICINE

## 2022-01-01 PROCEDURE — 86923 COMPATIBILITY TEST ELECTRIC: CPT

## 2022-01-01 PROCEDURE — 0D598ZZ DESTRUCTION OF DUODENUM, VIA NATURAL OR ARTIFICIAL OPENING ENDOSCOPIC: ICD-10-PCS | Performed by: INTERNAL MEDICINE

## 2022-01-01 PROCEDURE — 83540 ASSAY OF IRON: CPT | Performed by: INTERNAL MEDICINE

## 2022-01-01 PROCEDURE — G0008 ADMIN INFLUENZA VIRUS VAC: HCPCS | Performed by: FAMILY MEDICINE

## 2022-01-01 PROCEDURE — 85027 COMPLETE CBC AUTOMATED: CPT | Performed by: FAMILY MEDICINE

## 2022-01-01 PROCEDURE — 87205 SMEAR GRAM STAIN: CPT

## 2022-01-01 PROCEDURE — 88108 CYTOPATH CONCENTRATE TECH: CPT | Performed by: INTERNAL MEDICINE

## 2022-01-01 PROCEDURE — 63710000001 ISOSORBIDE MONONITRATE 60 MG TABLET SUSTAINED-RELEASE 24 HOUR: Performed by: NURSE PRACTITIONER

## 2022-01-01 PROCEDURE — 93005 ELECTROCARDIOGRAM TRACING: CPT | Performed by: EMERGENCY MEDICINE

## 2022-01-01 PROCEDURE — 99213 OFFICE O/P EST LOW 20 MIN: CPT | Performed by: PHYSICAL MEDICINE & REHABILITATION

## 2022-01-01 PROCEDURE — 25010000002 METOCLOPRAMIDE PER 10 MG: Performed by: ANESTHESIOLOGY

## 2022-01-01 PROCEDURE — 25010000002 CALCIUM GLUCONATE 2-0.675 GM/100ML-% SOLUTION: Performed by: INTERNAL MEDICINE

## 2022-01-01 PROCEDURE — 25010000002 METHYLPREDNISOLONE PER 40 MG: Performed by: INTERNAL MEDICINE

## 2022-01-01 PROCEDURE — 84466 ASSAY OF TRANSFERRIN: CPT | Performed by: NURSE PRACTITIONER

## 2022-01-01 PROCEDURE — 94618 PULMONARY STRESS TESTING: CPT

## 2022-01-01 PROCEDURE — 25010000002 ONDANSETRON PER 1 MG: Performed by: ANESTHESIOLOGY

## 2022-01-01 PROCEDURE — 99231 SBSQ HOSP IP/OBS SF/LOW 25: CPT | Performed by: NURSE PRACTITIONER

## 2022-01-01 PROCEDURE — 02HV33Z INSERTION OF INFUSION DEVICE INTO SUPERIOR VENA CAVA, PERCUTANEOUS APPROACH: ICD-10-PCS | Performed by: ORTHOPAEDIC SURGERY

## 2022-01-01 PROCEDURE — U0003 INFECTIOUS AGENT DETECTION BY NUCLEIC ACID (DNA OR RNA); SEVERE ACUTE RESPIRATORY SYNDROME CORONAVIRUS 2 (SARS-COV-2) (CORONAVIRUS DISEASE [COVID-19]), AMPLIFIED PROBE TECHNIQUE, MAKING USE OF HIGH THROUGHPUT TECHNOLOGIES AS DESCRIBED BY CMS-2020-01-R: HCPCS | Performed by: NURSE PRACTITIONER

## 2022-01-01 PROCEDURE — 25010000002 NA FERRIC GLUC CPLX PER 12.5 MG: Performed by: NURSE PRACTITIONER

## 2022-01-01 PROCEDURE — 25010000002 DEXAMETHASONE PER 1 MG: Performed by: ANESTHESIOLOGY

## 2022-01-01 PROCEDURE — 63710000001 ATORVASTATIN 40 MG TABLET: Performed by: NURSE PRACTITIONER

## 2022-01-01 PROCEDURE — 87070 CULTURE OTHR SPECIMN AEROBIC: CPT | Performed by: NURSE PRACTITIONER

## 2022-01-01 PROCEDURE — 97530 THERAPEUTIC ACTIVITIES: CPT

## 2022-01-01 PROCEDURE — 0B9F8ZX DRAINAGE OF RIGHT LOWER LUNG LOBE, VIA NATURAL OR ARTIFICIAL OPENING ENDOSCOPIC, DIAGNOSTIC: ICD-10-PCS | Performed by: INTERNAL MEDICINE

## 2022-01-01 PROCEDURE — 99223 1ST HOSP IP/OBS HIGH 75: CPT | Performed by: SURGERY

## 2022-01-01 PROCEDURE — 63710000001 METOPROLOL TARTRATE 25 MG TABLET: Performed by: NURSE PRACTITIONER

## 2022-01-01 PROCEDURE — 80048 BASIC METABOLIC PNL TOTAL CA: CPT | Performed by: ORTHOPAEDIC SURGERY

## 2022-01-01 PROCEDURE — P9040 RBC LEUKOREDUCED IRRADIATED: HCPCS

## 2022-01-01 PROCEDURE — 99221 1ST HOSP IP/OBS SF/LOW 40: CPT | Performed by: PODIATRIST

## 2022-01-01 PROCEDURE — 84443 ASSAY THYROID STIM HORMONE: CPT | Performed by: INTERNAL MEDICINE

## 2022-01-01 PROCEDURE — 90662 IIV NO PRSV INCREASED AG IM: CPT | Performed by: FAMILY MEDICINE

## 2022-01-01 PROCEDURE — 83036 HEMOGLOBIN GLYCOSYLATED A1C: CPT | Performed by: INTERNAL MEDICINE

## 2022-01-01 PROCEDURE — 25010000002 INFLUENZA VAC HIGH-DOSE QUAD 0.7 ML SUSPENSION PREFILLED SYRINGE: Performed by: FAMILY MEDICINE

## 2022-01-01 PROCEDURE — 87076 CULTURE ANAEROBE IDENT EACH: CPT

## 2022-01-01 PROCEDURE — 25010000002 HYDROMORPHONE 1 MG/ML SOLUTION: Performed by: EMERGENCY MEDICINE

## 2022-01-01 PROCEDURE — U0003 INFECTIOUS AGENT DETECTION BY NUCLEIC ACID (DNA OR RNA); SEVERE ACUTE RESPIRATORY SYNDROME CORONAVIRUS 2 (SARS-COV-2) (CORONAVIRUS DISEASE [COVID-19]), AMPLIFIED PROBE TECHNIQUE, MAKING USE OF HIGH THROUGHPUT TECHNOLOGIES AS DESCRIBED BY CMS-2020-01-R: HCPCS | Performed by: FAMILY MEDICINE

## 2022-01-01 PROCEDURE — 93017 CV STRESS TEST TRACING ONLY: CPT

## 2022-01-01 PROCEDURE — 87116 MYCOBACTERIA CULTURE: CPT | Performed by: INTERNAL MEDICINE

## 2022-01-01 PROCEDURE — 93005 ELECTROCARDIOGRAM TRACING: CPT | Performed by: INTERNAL MEDICINE

## 2022-01-01 PROCEDURE — 25010000002 PROPOFOL 10 MG/ML EMULSION: Performed by: ANESTHESIOLOGY

## 2022-01-01 PROCEDURE — 87150 DNA/RNA AMPLIFIED PROBE: CPT | Performed by: EMERGENCY MEDICINE

## 2022-01-01 PROCEDURE — 0D9670Z DRAINAGE OF STOMACH WITH DRAINAGE DEVICE, VIA NATURAL OR ARTIFICIAL OPENING: ICD-10-PCS | Performed by: FAMILY MEDICINE

## 2022-01-01 PROCEDURE — 81001 URINALYSIS AUTO W/SCOPE: CPT | Performed by: INTERNAL MEDICINE

## 2022-01-01 PROCEDURE — 85007 BL SMEAR W/DIFF WBC COUNT: CPT

## 2022-01-01 PROCEDURE — 84439 ASSAY OF FREE THYROXINE: CPT | Performed by: INTERNAL MEDICINE

## 2022-01-01 PROCEDURE — 85379 FIBRIN DEGRADATION QUANT: CPT | Performed by: EMERGENCY MEDICINE

## 2022-01-01 PROCEDURE — 99232 SBSQ HOSP IP/OBS MODERATE 35: CPT | Performed by: INTERNAL MEDICINE

## 2022-01-01 PROCEDURE — 99222 1ST HOSP IP/OBS MODERATE 55: CPT | Performed by: INTERNAL MEDICINE

## 2022-01-01 PROCEDURE — 80202 ASSAY OF VANCOMYCIN: CPT

## 2022-01-01 PROCEDURE — 82330 ASSAY OF CALCIUM: CPT | Performed by: INTERNAL MEDICINE

## 2022-01-01 PROCEDURE — 81003 URINALYSIS AUTO W/O SCOPE: CPT | Performed by: EMERGENCY MEDICINE

## 2022-01-01 PROCEDURE — 25010000002 CEFTRIAXONE PER 250 MG

## 2022-01-01 PROCEDURE — 74230 X-RAY XM SWLNG FUNCJ C+: CPT

## 2022-01-01 PROCEDURE — 87070 CULTURE OTHR SPECIMN AEROBIC: CPT | Performed by: INTERNAL MEDICINE

## 2022-01-01 PROCEDURE — 25010000002 VANCOMYCIN 1 G RECONSTITUTED SOLUTION: Performed by: ORTHOPAEDIC SURGERY

## 2022-01-01 PROCEDURE — 99221 1ST HOSP IP/OBS SF/LOW 40: CPT | Performed by: ORTHOPAEDIC SURGERY

## 2022-01-01 PROCEDURE — 87086 URINE CULTURE/COLONY COUNT: CPT | Performed by: EMERGENCY MEDICINE

## 2022-01-01 PROCEDURE — 83880 ASSAY OF NATRIURETIC PEPTIDE: CPT | Performed by: INTERNAL MEDICINE

## 2022-01-01 PROCEDURE — 74176 CT ABD & PELVIS W/O CONTRAST: CPT

## 2022-01-01 PROCEDURE — 87040 BLOOD CULTURE FOR BACTERIA: CPT | Performed by: INTERNAL MEDICINE

## 2022-01-01 PROCEDURE — 78452 HT MUSCLE IMAGE SPECT MULT: CPT | Performed by: INTERNAL MEDICINE

## 2022-01-01 PROCEDURE — 99214 OFFICE O/P EST MOD 30 MIN: CPT | Performed by: INTERNAL MEDICINE

## 2022-01-01 PROCEDURE — 84145 PROCALCITONIN (PCT): CPT | Performed by: EMERGENCY MEDICINE

## 2022-01-01 PROCEDURE — 87798 DETECT AGENT NOS DNA AMP: CPT | Performed by: FAMILY MEDICINE

## 2022-01-01 PROCEDURE — 84466 ASSAY OF TRANSFERRIN: CPT | Performed by: INTERNAL MEDICINE

## 2022-01-01 PROCEDURE — A9502 TC99M TETROFOSMIN: HCPCS | Performed by: FAMILY MEDICINE

## 2022-01-01 PROCEDURE — 25010000002 VANCOMYCIN 10 G RECONSTITUTED SOLUTION: Performed by: FAMILY MEDICINE

## 2022-01-01 PROCEDURE — 87186 SC STD MICRODIL/AGAR DIL: CPT

## 2022-01-01 PROCEDURE — 25010000002 FUROSEMIDE PER 20 MG: Performed by: INTERNAL MEDICINE

## 2022-01-01 PROCEDURE — 0RPM04Z REMOVAL OF INTERNAL FIXATION DEVICE FROM LEFT ELBOW JOINT, OPEN APPROACH: ICD-10-PCS | Performed by: ORTHOPAEDIC SURGERY

## 2022-01-01 PROCEDURE — 85007 BL SMEAR W/DIFF WBC COUNT: CPT | Performed by: INTERNAL MEDICINE

## 2022-01-01 PROCEDURE — 25010000002 METHYLPREDNISOLONE PER 40 MG: Performed by: FAMILY MEDICINE

## 2022-01-01 PROCEDURE — 84550 ASSAY OF BLOOD/URIC ACID: CPT | Performed by: INTERNAL MEDICINE

## 2022-01-01 PROCEDURE — 63710000001 MIRTAZAPINE 15 MG TABLET: Performed by: NURSE PRACTITIONER

## 2022-01-01 PROCEDURE — 80053 COMPREHEN METABOLIC PANEL: CPT

## 2022-01-01 PROCEDURE — 74177 CT ABD & PELVIS W/CONTRAST: CPT

## 2022-01-01 PROCEDURE — 85018 HEMOGLOBIN: CPT | Performed by: FAMILY MEDICINE

## 2022-01-01 PROCEDURE — 25010000002 ROPIVACAINE PER 1 MG: Performed by: ANESTHESIOLOGY

## 2022-01-01 PROCEDURE — 63710000001 ALUMINUM-MAGNESIUM HYDROXIDE-SIMETHICONE 400-400-40 MG/5ML SUSPENSION 30 ML CUP: Performed by: NURSE PRACTITIONER

## 2022-01-01 PROCEDURE — 82274 ASSAY TEST FOR BLOOD FECAL: CPT | Performed by: FAMILY MEDICINE

## 2022-01-01 PROCEDURE — 20680 REMOVAL OF IMPLANT DEEP: CPT | Performed by: ORTHOPAEDIC SURGERY

## 2022-01-01 PROCEDURE — 87015 SPECIMEN INFECT AGNT CONCNTJ: CPT | Performed by: NURSE PRACTITIONER

## 2022-01-01 PROCEDURE — 83605 ASSAY OF LACTIC ACID: CPT | Performed by: INTERNAL MEDICINE

## 2022-01-01 PROCEDURE — 76942 ECHO GUIDE FOR BIOPSY: CPT | Performed by: ORTHOPAEDIC SURGERY

## 2022-01-01 PROCEDURE — 87102 FUNGUS ISOLATION CULTURE: CPT | Performed by: INTERNAL MEDICINE

## 2022-01-01 PROCEDURE — 87075 CULTR BACTERIA EXCEPT BLOOD: CPT | Performed by: NURSE PRACTITIONER

## 2022-01-01 PROCEDURE — 87040 BLOOD CULTURE FOR BACTERIA: CPT | Performed by: NURSE PRACTITIONER

## 2022-01-01 RX ORDER — TRAZODONE HYDROCHLORIDE 100 MG/1
100 TABLET ORAL NIGHTLY
Status: DISCONTINUED | OUTPATIENT
Start: 2022-01-01 | End: 2022-01-01 | Stop reason: HOSPADM

## 2022-01-01 RX ORDER — INSULIN LISPRO 100 [IU]/ML
0-7 INJECTION, SOLUTION INTRAVENOUS; SUBCUTANEOUS
Status: DISCONTINUED | OUTPATIENT
Start: 2022-01-01 | End: 2022-01-01

## 2022-01-01 RX ORDER — IPRATROPIUM BROMIDE AND ALBUTEROL SULFATE 2.5; .5 MG/3ML; MG/3ML
3 SOLUTION RESPIRATORY (INHALATION)
Status: DISCONTINUED | OUTPATIENT
Start: 2022-01-01 | End: 2022-01-01

## 2022-01-01 RX ORDER — ATORVASTATIN CALCIUM 40 MG/1
40 TABLET, FILM COATED ORAL EVERY MORNING
Status: DISCONTINUED | OUTPATIENT
Start: 2022-01-01 | End: 2022-01-01 | Stop reason: HOSPADM

## 2022-01-01 RX ORDER — DEXTROSE MONOHYDRATE 25 G/50ML
25 INJECTION, SOLUTION INTRAVENOUS
Status: DISCONTINUED | OUTPATIENT
Start: 2022-01-01 | End: 2022-01-01 | Stop reason: HOSPADM

## 2022-01-01 RX ORDER — FAMOTIDINE 20 MG/1
20 TABLET, FILM COATED ORAL
Status: DISCONTINUED | OUTPATIENT
Start: 2022-01-01 | End: 2022-01-01 | Stop reason: HOSPADM

## 2022-01-01 RX ORDER — AMOXICILLIN AND CLAVULANATE POTASSIUM 875; 125 MG/1; MG/1
1 TABLET, FILM COATED ORAL EVERY 12 HOURS SCHEDULED
Qty: 11 TABLET | Refills: 0 | Status: SHIPPED | OUTPATIENT
Start: 2022-01-01 | End: 2022-01-01

## 2022-01-01 RX ORDER — SUCRALFATE 1 G/1
1 TABLET ORAL 4 TIMES DAILY
COMMUNITY
Start: 2022-01-01

## 2022-01-01 RX ORDER — SODIUM CHLORIDE 9 MG/ML
75 INJECTION, SOLUTION INTRAVENOUS CONTINUOUS
Status: DISCONTINUED | OUTPATIENT
Start: 2022-01-01 | End: 2022-01-01

## 2022-01-01 RX ORDER — RANOLAZINE 500 MG/1
1000 TABLET, EXTENDED RELEASE ORAL EVERY 12 HOURS SCHEDULED
Status: DISCONTINUED | OUTPATIENT
Start: 2022-01-01 | End: 2022-01-01 | Stop reason: HOSPADM

## 2022-01-01 RX ORDER — FUROSEMIDE 40 MG/1
40 TABLET ORAL DAILY
Status: DISCONTINUED | OUTPATIENT
Start: 2022-01-01 | End: 2022-01-01 | Stop reason: HOSPADM

## 2022-01-01 RX ORDER — ACETAMINOPHEN 650 MG/1
650 SUPPOSITORY RECTAL EVERY 4 HOURS PRN
Status: DISCONTINUED | OUTPATIENT
Start: 2022-01-01 | End: 2022-01-01 | Stop reason: HOSPADM

## 2022-01-01 RX ORDER — DOCUSATE SODIUM 100 MG/1
100 CAPSULE, LIQUID FILLED ORAL 2 TIMES DAILY
Status: DISCONTINUED | OUTPATIENT
Start: 2022-01-01 | End: 2022-01-01 | Stop reason: HOSPADM

## 2022-01-01 RX ORDER — HYDROXYZINE HYDROCHLORIDE 25 MG/1
25 TABLET, FILM COATED ORAL NIGHTLY PRN
Status: DISCONTINUED | OUTPATIENT
Start: 2022-01-01 | End: 2022-01-01 | Stop reason: HOSPADM

## 2022-01-01 RX ORDER — ONDANSETRON 2 MG/ML
4 INJECTION INTRAMUSCULAR; INTRAVENOUS ONCE AS NEEDED
Status: DISCONTINUED | OUTPATIENT
Start: 2022-01-01 | End: 2022-01-01 | Stop reason: HOSPADM

## 2022-01-01 RX ORDER — PREGABALIN 150 MG/1
150 CAPSULE ORAL 2 TIMES DAILY
Qty: 60 CAPSULE | Refills: 5 | Status: SHIPPED | OUTPATIENT
Start: 2022-01-01 | End: 2023-01-01 | Stop reason: SDUPTHER

## 2022-01-01 RX ORDER — MIDAZOLAM HYDROCHLORIDE 1 MG/ML
INJECTION INTRAMUSCULAR; INTRAVENOUS
Status: COMPLETED | OUTPATIENT
Start: 2022-01-01 | End: 2022-01-01

## 2022-01-01 RX ORDER — VANCOMYCIN 1.75 GRAM/500 ML IN 0.9 % SODIUM CHLORIDE INTRAVENOUS
1750 EVERY 24 HOURS
Status: DISCONTINUED | OUTPATIENT
Start: 2022-01-01 | End: 2022-01-01

## 2022-01-01 RX ORDER — IPRATROPIUM BROMIDE AND ALBUTEROL SULFATE 2.5; .5 MG/3ML; MG/3ML
3 SOLUTION RESPIRATORY (INHALATION) 4 TIMES DAILY PRN
Status: DISCONTINUED | OUTPATIENT
Start: 2022-01-01 | End: 2022-01-01 | Stop reason: HOSPADM

## 2022-01-01 RX ORDER — LISINOPRIL 2.5 MG/1
2.5 TABLET ORAL DAILY
Qty: 30 TABLET | Refills: 3 | Status: ON HOLD | OUTPATIENT
Start: 2022-01-01 | End: 2022-01-01

## 2022-01-01 RX ORDER — INSULIN LISPRO 100 [IU]/ML
0-9 INJECTION, SOLUTION INTRAVENOUS; SUBCUTANEOUS
Status: DISCONTINUED | OUTPATIENT
Start: 2022-01-01 | End: 2022-01-01 | Stop reason: HOSPADM

## 2022-01-01 RX ORDER — ACETAMINOPHEN 325 MG/1
650 TABLET ORAL EVERY 4 HOURS PRN
Status: DISCONTINUED | OUTPATIENT
Start: 2022-01-01 | End: 2022-01-01 | Stop reason: HOSPADM

## 2022-01-01 RX ORDER — NICOTINE POLACRILEX 4 MG
15 LOZENGE BUCCAL
Status: DISCONTINUED | OUTPATIENT
Start: 2022-01-01 | End: 2022-01-01 | Stop reason: HOSPADM

## 2022-01-01 RX ORDER — PROPOFOL 10 MG/ML
INJECTION, EMULSION INTRAVENOUS AS NEEDED
Status: DISCONTINUED | OUTPATIENT
Start: 2022-01-01 | End: 2022-01-01 | Stop reason: SURG

## 2022-01-01 RX ORDER — MIRTAZAPINE 15 MG/1
30 TABLET, FILM COATED ORAL NIGHTLY
Status: DISCONTINUED | OUTPATIENT
Start: 2022-01-01 | End: 2022-01-01 | Stop reason: HOSPADM

## 2022-01-01 RX ORDER — LACTULOSE 10 G/15ML
10 SOLUTION ORAL ONCE
Status: COMPLETED | OUTPATIENT
Start: 2022-01-01 | End: 2022-01-01

## 2022-01-01 RX ORDER — FUROSEMIDE 10 MG/ML
20 INJECTION INTRAMUSCULAR; INTRAVENOUS ONCE
Status: COMPLETED | OUTPATIENT
Start: 2022-01-01 | End: 2022-01-01

## 2022-01-01 RX ORDER — VANCOMYCIN 1.75 GRAM/500 ML IN 0.9 % SODIUM CHLORIDE INTRAVENOUS
20 ONCE
Status: COMPLETED | OUTPATIENT
Start: 2022-01-01 | End: 2022-01-01

## 2022-01-01 RX ORDER — DOXYCYCLINE 100 MG/1
100 TABLET ORAL EVERY 12 HOURS SCHEDULED
Status: DISCONTINUED | OUTPATIENT
Start: 2022-01-01 | End: 2022-01-01

## 2022-01-01 RX ORDER — INSULIN LISPRO 100 [IU]/ML
0-14 INJECTION, SOLUTION INTRAVENOUS; SUBCUTANEOUS AS NEEDED
Status: DISCONTINUED | OUTPATIENT
Start: 2022-01-01 | End: 2022-01-01 | Stop reason: HOSPADM

## 2022-01-01 RX ORDER — INSULIN LISPRO 100 [IU]/ML
0-9 INJECTION, SOLUTION INTRAVENOUS; SUBCUTANEOUS AS NEEDED
Status: DISCONTINUED | OUTPATIENT
Start: 2022-01-01 | End: 2022-01-01

## 2022-01-01 RX ORDER — SODIUM CHLORIDE 0.9 % (FLUSH) 0.9 %
10 SYRINGE (ML) INJECTION AS NEEDED
Status: DISCONTINUED | OUTPATIENT
Start: 2022-01-01 | End: 2022-01-01 | Stop reason: HOSPADM

## 2022-01-01 RX ORDER — OXYCODONE HYDROCHLORIDE 5 MG/1
10 TABLET ORAL ONCE
Status: COMPLETED | OUTPATIENT
Start: 2022-01-01 | End: 2022-01-01

## 2022-01-01 RX ORDER — BUDESONIDE 0.5 MG/2ML
0.5 INHALANT ORAL
Status: DISCONTINUED | OUTPATIENT
Start: 2022-01-01 | End: 2022-01-01 | Stop reason: HOSPADM

## 2022-01-01 RX ORDER — IPRATROPIUM BROMIDE AND ALBUTEROL SULFATE 2.5; .5 MG/3ML; MG/3ML
3 SOLUTION RESPIRATORY (INHALATION) 4 TIMES DAILY PRN
Status: DISCONTINUED | OUTPATIENT
Start: 2022-01-01 | End: 2022-01-01

## 2022-01-01 RX ORDER — OXYCODONE HYDROCHLORIDE 5 MG/1
5 TABLET ORAL EVERY 8 HOURS PRN
Status: DISCONTINUED | OUTPATIENT
Start: 2022-01-01 | End: 2022-01-01 | Stop reason: HOSPADM

## 2022-01-01 RX ORDER — IPRATROPIUM BROMIDE AND ALBUTEROL SULFATE 2.5; .5 MG/3ML; MG/3ML
3 SOLUTION RESPIRATORY (INHALATION) ONCE AS NEEDED
Status: COMPLETED | OUTPATIENT
Start: 2022-01-01 | End: 2022-01-01

## 2022-01-01 RX ORDER — ASPIRIN 81 MG/1
81 TABLET ORAL DAILY
Status: DISCONTINUED | OUTPATIENT
Start: 2022-01-01 | End: 2022-01-01 | Stop reason: HOSPADM

## 2022-01-01 RX ORDER — ISOSORBIDE MONONITRATE 60 MG/1
120 TABLET, EXTENDED RELEASE ORAL
Status: DISCONTINUED | OUTPATIENT
Start: 2022-01-01 | End: 2022-01-01 | Stop reason: HOSPADM

## 2022-01-01 RX ORDER — SODIUM CHLORIDE 0.9 % (FLUSH) 0.9 %
3-10 SYRINGE (ML) INJECTION AS NEEDED
Status: DISCONTINUED | OUTPATIENT
Start: 2022-01-01 | End: 2022-01-01 | Stop reason: HOSPADM

## 2022-01-01 RX ORDER — OXYCODONE AND ACETAMINOPHEN 10; 325 MG/1; MG/1
1 TABLET ORAL EVERY 6 HOURS PRN
Qty: 120 TABLET | Refills: 0 | Status: SHIPPED | OUTPATIENT
Start: 2022-01-01 | End: 2022-01-01 | Stop reason: SDUPTHER

## 2022-01-01 RX ORDER — SODIUM CHLORIDE 0.9 % (FLUSH) 0.9 %
3 SYRINGE (ML) INJECTION EVERY 12 HOURS SCHEDULED
Status: DISCONTINUED | OUTPATIENT
Start: 2022-01-01 | End: 2022-01-01 | Stop reason: HOSPADM

## 2022-01-01 RX ORDER — ONDANSETRON 2 MG/ML
4 INJECTION INTRAMUSCULAR; INTRAVENOUS EVERY 6 HOURS PRN
Status: DISCONTINUED | OUTPATIENT
Start: 2022-01-01 | End: 2022-01-01

## 2022-01-01 RX ORDER — INSULIN LISPRO 100 [IU]/ML
7 INJECTION, SOLUTION INTRAVENOUS; SUBCUTANEOUS
Status: DISCONTINUED | OUTPATIENT
Start: 2022-01-01 | End: 2022-01-01 | Stop reason: HOSPADM

## 2022-01-01 RX ORDER — LIDOCAINE HYDROCHLORIDE 10 MG/ML
INJECTION, SOLUTION EPIDURAL; INFILTRATION; INTRACAUDAL; PERINEURAL AS NEEDED
Status: DISCONTINUED | OUTPATIENT
Start: 2022-01-01 | End: 2022-01-01 | Stop reason: SURG

## 2022-01-01 RX ORDER — EPHEDRINE SULFATE 5 MG/ML
5 INJECTION INTRAVENOUS ONCE AS NEEDED
Status: DISCONTINUED | OUTPATIENT
Start: 2022-01-01 | End: 2022-01-01 | Stop reason: HOSPADM

## 2022-01-01 RX ORDER — PREDNISONE 20 MG/1
20 TABLET ORAL DAILY
Status: DISCONTINUED | OUTPATIENT
Start: 2022-01-01 | End: 2022-01-01 | Stop reason: HOSPADM

## 2022-01-01 RX ORDER — SODIUM CHLORIDE 9 MG/ML
125 INJECTION, SOLUTION INTRAVENOUS CONTINUOUS
Status: DISCONTINUED | OUTPATIENT
Start: 2022-01-01 | End: 2022-01-01

## 2022-01-01 RX ORDER — ROCURONIUM BROMIDE 10 MG/ML
INJECTION, SOLUTION INTRAVENOUS AS NEEDED
Status: DISCONTINUED | OUTPATIENT
Start: 2022-01-01 | End: 2022-01-01 | Stop reason: SURG

## 2022-01-01 RX ORDER — BUDESONIDE 0.5 MG/2ML
0.5 INHALANT ORAL 2 TIMES DAILY PRN
Status: DISCONTINUED | OUTPATIENT
Start: 2022-01-01 | End: 2022-01-01 | Stop reason: HOSPADM

## 2022-01-01 RX ORDER — OXYCODONE HYDROCHLORIDE 5 MG/1
10 TABLET ORAL EVERY 4 HOURS PRN
Refills: 0 | Status: DISCONTINUED | OUTPATIENT
Start: 2022-01-01 | End: 2022-01-01 | Stop reason: HOSPADM

## 2022-01-01 RX ORDER — RANOLAZINE 500 MG/1
500 TABLET, EXTENDED RELEASE ORAL 2 TIMES DAILY
Status: DISCONTINUED | OUTPATIENT
Start: 2022-01-01 | End: 2022-01-01 | Stop reason: HOSPADM

## 2022-01-01 RX ORDER — HYDROXYZINE HYDROCHLORIDE 25 MG/1
25 TABLET, FILM COATED ORAL NIGHTLY PRN
Status: DISCONTINUED | OUTPATIENT
Start: 2022-01-01 | End: 2022-01-01

## 2022-01-01 RX ORDER — METHYLPREDNISOLONE SODIUM SUCCINATE 125 MG/2ML
125 INJECTION, POWDER, LYOPHILIZED, FOR SOLUTION INTRAMUSCULAR; INTRAVENOUS ONCE
Status: COMPLETED | OUTPATIENT
Start: 2022-01-01 | End: 2022-01-01

## 2022-01-01 RX ORDER — NITROGLYCERIN 0.4 MG/1
0.4 TABLET SUBLINGUAL
Status: DISCONTINUED | OUTPATIENT
Start: 2022-01-01 | End: 2022-01-01 | Stop reason: HOSPADM

## 2022-01-01 RX ORDER — SUCRALFATE 1 G/1
1 TABLET ORAL
Status: DISCONTINUED | OUTPATIENT
Start: 2022-01-01 | End: 2022-01-01 | Stop reason: HOSPADM

## 2022-01-01 RX ORDER — INSULIN LISPRO 100 [IU]/ML
0-14 INJECTION, SOLUTION INTRAVENOUS; SUBCUTANEOUS
Status: DISCONTINUED | OUTPATIENT
Start: 2022-01-01 | End: 2022-01-01 | Stop reason: HOSPADM

## 2022-01-01 RX ORDER — IPRATROPIUM BROMIDE AND ALBUTEROL SULFATE 2.5; .5 MG/3ML; MG/3ML
3 SOLUTION RESPIRATORY (INHALATION)
Status: DISCONTINUED | OUTPATIENT
Start: 2022-01-01 | End: 2022-01-01 | Stop reason: HOSPADM

## 2022-01-01 RX ORDER — PROPOFOL 10 MG/ML
VIAL (ML) INTRAVENOUS AS NEEDED
Status: DISCONTINUED | OUTPATIENT
Start: 2022-01-01 | End: 2022-01-01 | Stop reason: SURG

## 2022-01-01 RX ORDER — KETOROLAC TROMETHAMINE 15 MG/ML
15 INJECTION, SOLUTION INTRAMUSCULAR; INTRAVENOUS ONCE
Status: COMPLETED | OUTPATIENT
Start: 2022-01-01 | End: 2022-01-01

## 2022-01-01 RX ORDER — OLANZAPINE 10 MG/2ML
1 INJECTION, POWDER, LYOPHILIZED, FOR SOLUTION INTRAMUSCULAR AS NEEDED
Status: DISCONTINUED | OUTPATIENT
Start: 2022-01-01 | End: 2022-01-01 | Stop reason: HOSPADM

## 2022-01-01 RX ORDER — SODIUM CHLORIDE 9 MG/ML
75 INJECTION, SOLUTION INTRAVENOUS CONTINUOUS
Status: DISCONTINUED | OUTPATIENT
Start: 2022-01-01 | End: 2022-01-01 | Stop reason: HOSPADM

## 2022-01-01 RX ORDER — ISOSORBIDE MONONITRATE 30 MG/1
120 TABLET, EXTENDED RELEASE ORAL
Status: DISCONTINUED | OUTPATIENT
Start: 2022-01-01 | End: 2022-01-01 | Stop reason: HOSPADM

## 2022-01-01 RX ORDER — OXYCODONE HYDROCHLORIDE 5 MG/1
10 TABLET ORAL EVERY 4 HOURS PRN
Status: DISCONTINUED | OUTPATIENT
Start: 2022-01-01 | End: 2022-01-01 | Stop reason: HOSPADM

## 2022-01-01 RX ORDER — ONDANSETRON 2 MG/ML
4 INJECTION INTRAMUSCULAR; INTRAVENOUS ONCE
Status: COMPLETED | OUTPATIENT
Start: 2022-01-01 | End: 2022-01-01

## 2022-01-01 RX ORDER — OLANZAPINE 10 MG/2ML
1 INJECTION, POWDER, LYOPHILIZED, FOR SOLUTION INTRAMUSCULAR
Status: DISCONTINUED | OUTPATIENT
Start: 2022-01-01 | End: 2022-01-01 | Stop reason: HOSPADM

## 2022-01-01 RX ORDER — HYDROCODONE BITARTRATE AND ACETAMINOPHEN 5; 325 MG/1; MG/1
1 TABLET ORAL ONCE AS NEEDED
Status: DISCONTINUED | OUTPATIENT
Start: 2022-01-01 | End: 2022-01-01 | Stop reason: HOSPADM

## 2022-01-01 RX ORDER — OXYCODONE AND ACETAMINOPHEN 10; 325 MG/1; MG/1
1 TABLET ORAL EVERY 6 HOURS PRN
Qty: 120 TABLET | Refills: 0 | Status: SHIPPED | OUTPATIENT
Start: 2022-01-01 | End: 2022-01-01 | Stop reason: HOSPADM

## 2022-01-01 RX ORDER — HYDRALAZINE HYDROCHLORIDE 20 MG/ML
10 INJECTION INTRAMUSCULAR; INTRAVENOUS EVERY 6 HOURS PRN
Status: DISCONTINUED | OUTPATIENT
Start: 2022-01-01 | End: 2022-01-01 | Stop reason: HOSPADM

## 2022-01-01 RX ORDER — ONDANSETRON 2 MG/ML
4 INJECTION INTRAMUSCULAR; INTRAVENOUS EVERY 6 HOURS PRN
Status: DISCONTINUED | OUTPATIENT
Start: 2022-01-01 | End: 2022-01-01 | Stop reason: HOSPADM

## 2022-01-01 RX ORDER — FUROSEMIDE 40 MG/1
40 TABLET ORAL EVERY MORNING
Status: DISCONTINUED | OUTPATIENT
Start: 2022-01-01 | End: 2022-01-01

## 2022-01-01 RX ORDER — CHOLECALCIFEROL (VITAMIN D3) 125 MCG
5 CAPSULE ORAL NIGHTLY PRN
Status: DISCONTINUED | OUTPATIENT
Start: 2022-01-01 | End: 2022-01-01 | Stop reason: HOSPADM

## 2022-01-01 RX ORDER — SORBITOL SOLUTION 70 %
50 SOLUTION, ORAL MISCELLANEOUS ONCE
Status: COMPLETED | OUTPATIENT
Start: 2022-01-01 | End: 2022-01-01

## 2022-01-01 RX ORDER — CEFUROXIME AXETIL 500 MG/1
500 TABLET ORAL EVERY 12 HOURS SCHEDULED
Qty: 2 TABLET | Refills: 0 | Status: SHIPPED | OUTPATIENT
Start: 2022-01-01 | End: 2022-01-01 | Stop reason: SDUPTHER

## 2022-01-01 RX ORDER — OXYCODONE HYDROCHLORIDE 5 MG/1
10 TABLET ORAL EVERY 6 HOURS PRN
Status: DISCONTINUED | OUTPATIENT
Start: 2022-01-01 | End: 2022-01-01

## 2022-01-01 RX ORDER — ASPIRIN 81 MG/1
81 TABLET ORAL 3 TIMES WEEKLY
Qty: 30 TABLET | Refills: 5 | Status: SHIPPED | OUTPATIENT
Start: 2022-01-01 | End: 2022-01-01

## 2022-01-01 RX ORDER — LISINOPRIL 2.5 MG/1
2.5 TABLET ORAL DAILY
Qty: 30 TABLET | Refills: 3 | Status: SHIPPED | OUTPATIENT
Start: 2022-01-01 | End: 2022-01-01 | Stop reason: SDUPTHER

## 2022-01-01 RX ORDER — ISOSORBIDE MONONITRATE 120 MG/1
120 TABLET, EXTENDED RELEASE ORAL DAILY
COMMUNITY
End: 2022-01-01 | Stop reason: HOSPADM

## 2022-01-01 RX ORDER — PREDNISONE 10 MG/1
10 TABLET ORAL DAILY
Status: DISCONTINUED | OUTPATIENT
Start: 2022-01-01 | End: 2022-01-01 | Stop reason: HOSPADM

## 2022-01-01 RX ORDER — ALBUTEROL SULFATE 2.5 MG/3ML
2.5 SOLUTION RESPIRATORY (INHALATION) EVERY 6 HOURS PRN
Status: DISCONTINUED | OUTPATIENT
Start: 2022-01-01 | End: 2022-01-01 | Stop reason: HOSPADM

## 2022-01-01 RX ORDER — SODIUM CHLORIDE 9 MG/ML
INJECTION, SOLUTION INTRAVENOUS CONTINUOUS PRN
Status: DISCONTINUED | OUTPATIENT
Start: 2022-01-01 | End: 2022-01-01 | Stop reason: SURG

## 2022-01-01 RX ORDER — ROPIVACAINE HYDROCHLORIDE 5 MG/ML
INJECTION, SOLUTION EPIDURAL; INFILTRATION; PERINEURAL
Status: COMPLETED | OUTPATIENT
Start: 2022-01-01 | End: 2022-01-01

## 2022-01-01 RX ORDER — IPRATROPIUM BROMIDE AND ALBUTEROL SULFATE 2.5; .5 MG/3ML; MG/3ML
3 SOLUTION RESPIRATORY (INHALATION) EVERY 4 HOURS PRN
Status: DISCONTINUED | OUTPATIENT
Start: 2022-01-01 | End: 2022-01-01

## 2022-01-01 RX ORDER — GUAIFENESIN 600 MG/1
600 TABLET, EXTENDED RELEASE ORAL EVERY 12 HOURS SCHEDULED
Status: DISCONTINUED | OUTPATIENT
Start: 2022-01-01 | End: 2022-01-01 | Stop reason: HOSPADM

## 2022-01-01 RX ORDER — OXYCODONE AND ACETAMINOPHEN 10; 325 MG/1; MG/1
1 TABLET ORAL EVERY 6 HOURS PRN
Qty: 120 TABLET | Refills: 0 | Status: SHIPPED | OUTPATIENT
Start: 2022-01-01 | End: 2022-01-01

## 2022-01-01 RX ORDER — FAMOTIDINE 20 MG/1
40 TABLET, FILM COATED ORAL
Status: DISCONTINUED | OUTPATIENT
Start: 2022-01-01 | End: 2022-01-01 | Stop reason: HOSPADM

## 2022-01-01 RX ORDER — IPRATROPIUM BROMIDE AND ALBUTEROL SULFATE 2.5; .5 MG/3ML; MG/3ML
3 SOLUTION RESPIRATORY (INHALATION) EVERY 4 HOURS PRN
Status: DISCONTINUED | OUTPATIENT
Start: 2022-01-01 | End: 2022-01-01 | Stop reason: HOSPADM

## 2022-01-01 RX ORDER — ACETAMINOPHEN 650 MG/1
650 SUPPOSITORY RECTAL ONCE AS NEEDED
Status: COMPLETED | OUTPATIENT
Start: 2022-01-01 | End: 2022-01-01

## 2022-01-01 RX ORDER — PREDNISONE 10 MG/1
TABLET ORAL
COMMUNITY
Start: 2022-01-01 | End: 2022-01-01

## 2022-01-01 RX ORDER — GLIPIZIDE 5 MG/1
5 TABLET ORAL
Status: DISCONTINUED | OUTPATIENT
Start: 2022-01-01 | End: 2022-01-01 | Stop reason: HOSPADM

## 2022-01-01 RX ORDER — AMOXICILLIN AND CLAVULANATE POTASSIUM 875; 125 MG/1; MG/1
1 TABLET, FILM COATED ORAL EVERY 12 HOURS SCHEDULED
Qty: 11 TABLET | Refills: 0 | Status: SHIPPED | OUTPATIENT
Start: 2022-01-01 | End: 2022-01-01 | Stop reason: SDUPTHER

## 2022-01-01 RX ORDER — ONDANSETRON 4 MG/1
4 TABLET, FILM COATED ORAL EVERY 6 HOURS PRN
Status: DISCONTINUED | OUTPATIENT
Start: 2022-01-01 | End: 2022-01-01 | Stop reason: HOSPADM

## 2022-01-01 RX ORDER — METOPROLOL TARTRATE 50 MG/1
50 TABLET, FILM COATED ORAL 2 TIMES DAILY WITH MEALS
COMMUNITY
Start: 2022-01-01

## 2022-01-01 RX ORDER — ONDANSETRON 2 MG/ML
INJECTION INTRAMUSCULAR; INTRAVENOUS AS NEEDED
Status: DISCONTINUED | OUTPATIENT
Start: 2022-01-01 | End: 2022-01-01 | Stop reason: SURG

## 2022-01-01 RX ORDER — SODIUM CHLORIDE 9 MG/ML
9 INJECTION, SOLUTION INTRAVENOUS ONCE
Status: COMPLETED | OUTPATIENT
Start: 2022-01-01 | End: 2022-01-01

## 2022-01-01 RX ORDER — GLYCOPYRROLATE 0.2 MG/ML
INJECTION INTRAMUSCULAR; INTRAVENOUS AS NEEDED
Status: DISCONTINUED | OUTPATIENT
Start: 2022-01-01 | End: 2022-01-01 | Stop reason: SURG

## 2022-01-01 RX ORDER — POTASSIUM CHLORIDE 20 MEQ/1
40 TABLET, EXTENDED RELEASE ORAL ONCE
Status: COMPLETED | OUTPATIENT
Start: 2022-01-01 | End: 2022-01-01

## 2022-01-01 RX ORDER — PROMETHAZINE HYDROCHLORIDE 25 MG/1
25 TABLET ORAL ONCE AS NEEDED
Status: DISCONTINUED | OUTPATIENT
Start: 2022-01-01 | End: 2022-01-01 | Stop reason: HOSPADM

## 2022-01-01 RX ORDER — PREDNISONE 10 MG/1
10 TABLET ORAL DAILY
Qty: 4 TABLET | Refills: 0 | Status: ON HOLD | OUTPATIENT
Start: 2022-01-01 | End: 2022-01-01

## 2022-01-01 RX ORDER — PREGABALIN 75 MG/1
150 CAPSULE ORAL 2 TIMES DAILY
Status: DISCONTINUED | OUTPATIENT
Start: 2022-01-01 | End: 2022-01-01 | Stop reason: HOSPADM

## 2022-01-01 RX ORDER — INSULIN LISPRO 100 [IU]/ML
0-9 INJECTION, SOLUTION INTRAVENOUS; SUBCUTANEOUS
Status: DISCONTINUED | OUTPATIENT
Start: 2022-01-01 | End: 2022-01-01

## 2022-01-01 RX ORDER — DOXYCYCLINE 100 MG/1
100 TABLET ORAL EVERY 12 HOURS SCHEDULED
Qty: 41 TABLET | Refills: 0 | Status: SHIPPED | OUTPATIENT
Start: 2022-01-01 | End: 2022-01-01 | Stop reason: HOSPADM

## 2022-01-01 RX ORDER — INSULIN LISPRO 100 [IU]/ML
0-24 INJECTION, SOLUTION INTRAVENOUS; SUBCUTANEOUS AS NEEDED
Status: DISCONTINUED | OUTPATIENT
Start: 2022-01-01 | End: 2022-01-01 | Stop reason: HOSPADM

## 2022-01-01 RX ORDER — SODIUM CHLORIDE 9 MG/ML
40 INJECTION, SOLUTION INTRAVENOUS AS NEEDED
Status: DISCONTINUED | OUTPATIENT
Start: 2022-01-01 | End: 2022-01-01 | Stop reason: HOSPADM

## 2022-01-01 RX ORDER — ROPINIROLE 0.5 MG/1
0.5 TABLET, FILM COATED ORAL NIGHTLY
COMMUNITY
End: 2022-01-01 | Stop reason: DRUGHIGH

## 2022-01-01 RX ORDER — BUDESONIDE 0.5 MG/2ML
0.5 INHALANT ORAL
Status: DISCONTINUED | OUTPATIENT
Start: 2022-01-01 | End: 2022-01-01

## 2022-01-01 RX ORDER — AMOXICILLIN AND CLAVULANATE POTASSIUM 875; 125 MG/1; MG/1
1 TABLET, FILM COATED ORAL EVERY 12 HOURS SCHEDULED
Status: DISCONTINUED | OUTPATIENT
Start: 2022-01-01 | End: 2022-01-01 | Stop reason: HOSPADM

## 2022-01-01 RX ORDER — METOLAZONE 5 MG/1
5 TABLET ORAL 3 TIMES WEEKLY
Status: DISCONTINUED | OUTPATIENT
Start: 2022-01-01 | End: 2022-01-01 | Stop reason: HOSPADM

## 2022-01-01 RX ORDER — SODIUM CHLORIDE 9 MG/ML
100 INJECTION, SOLUTION INTRAVENOUS CONTINUOUS
Status: DISCONTINUED | OUTPATIENT
Start: 2022-01-01 | End: 2022-01-01

## 2022-01-01 RX ORDER — DIAPER,BRIEF,INFANT-TODD,DISP
1 EACH MISCELLANEOUS EVERY 12 HOURS SCHEDULED
Status: DISCONTINUED | OUTPATIENT
Start: 2022-01-01 | End: 2022-01-01 | Stop reason: HOSPADM

## 2022-01-01 RX ORDER — GLIPIZIDE 5 MG/1
10 TABLET ORAL
Status: DISCONTINUED | OUTPATIENT
Start: 2022-01-01 | End: 2022-01-01

## 2022-01-01 RX ORDER — PREDNISONE 10 MG/1
10 TABLET ORAL DAILY
Qty: 4 TABLET | Refills: 0 | Status: SHIPPED | OUTPATIENT
Start: 2022-01-01 | End: 2022-01-01 | Stop reason: SDUPTHER

## 2022-01-01 RX ORDER — PREGABALIN 150 MG/1
150 CAPSULE ORAL 2 TIMES DAILY
Qty: 60 CAPSULE | Refills: 5 | Status: ON HOLD | OUTPATIENT
Start: 2022-01-01 | End: 2022-01-01

## 2022-01-01 RX ORDER — CEPHALEXIN 500 MG/1
500 CAPSULE ORAL EVERY 8 HOURS SCHEDULED
Status: DISCONTINUED | OUTPATIENT
Start: 2022-01-01 | End: 2022-01-01 | Stop reason: HOSPADM

## 2022-01-01 RX ORDER — ATORVASTATIN CALCIUM 40 MG/1
40 TABLET, FILM COATED ORAL NIGHTLY
Status: DISCONTINUED | OUTPATIENT
Start: 2022-01-01 | End: 2022-01-01 | Stop reason: HOSPADM

## 2022-01-01 RX ORDER — ISOSORBIDE MONONITRATE 120 MG/1
120 TABLET, EXTENDED RELEASE ORAL
Qty: 30 TABLET | Refills: 3 | Status: SHIPPED | OUTPATIENT
Start: 2022-01-01

## 2022-01-01 RX ORDER — LORAZEPAM 2 MG/ML
0.5 INJECTION INTRAMUSCULAR
Status: DISCONTINUED | OUTPATIENT
Start: 2022-01-01 | End: 2022-01-01 | Stop reason: HOSPADM

## 2022-01-01 RX ORDER — PHENYLEPHRINE HCL IN 0.9% NACL 1 MG/10 ML
SYRINGE (ML) INTRAVENOUS AS NEEDED
Status: DISCONTINUED | OUTPATIENT
Start: 2022-01-01 | End: 2022-01-01 | Stop reason: SURG

## 2022-01-01 RX ORDER — FAMOTIDINE 20 MG/1
20 TABLET, FILM COATED ORAL
Status: DISCONTINUED | OUTPATIENT
Start: 2022-01-01 | End: 2022-01-01

## 2022-01-01 RX ORDER — PROMETHAZINE HYDROCHLORIDE 25 MG/1
25 SUPPOSITORY RECTAL ONCE AS NEEDED
Status: DISCONTINUED | OUTPATIENT
Start: 2022-01-01 | End: 2022-01-01 | Stop reason: HOSPADM

## 2022-01-01 RX ORDER — GLIMEPIRIDE 2 MG/1
2 TABLET ORAL
Status: ON HOLD | COMMUNITY
End: 2022-01-01

## 2022-01-01 RX ORDER — ERGOCALCIFEROL 1.25 MG/1
50000 CAPSULE ORAL WEEKLY
COMMUNITY
End: 2023-01-01

## 2022-01-01 RX ORDER — DOXYCYCLINE 100 MG/1
100 TABLET ORAL EVERY 12 HOURS SCHEDULED
Status: DISCONTINUED | OUTPATIENT
Start: 2022-01-01 | End: 2022-01-01 | Stop reason: HOSPADM

## 2022-01-01 RX ORDER — OXYMETAZOLINE HYDROCHLORIDE 0.05 G/100ML
2 SPRAY NASAL 2 TIMES DAILY
Status: DISPENSED | OUTPATIENT
Start: 2022-01-01 | End: 2022-01-01

## 2022-01-01 RX ORDER — POTASSIUM CHLORIDE 1.5 G/1.77G
40 POWDER, FOR SOLUTION ORAL AS NEEDED
Status: DISCONTINUED | OUTPATIENT
Start: 2022-01-01 | End: 2022-01-01 | Stop reason: HOSPADM

## 2022-01-01 RX ORDER — SODIUM CHLORIDE 0.9 % (FLUSH) 0.9 %
10 SYRINGE (ML) INJECTION EVERY 12 HOURS SCHEDULED
Status: DISCONTINUED | OUTPATIENT
Start: 2022-01-01 | End: 2022-01-01 | Stop reason: HOSPADM

## 2022-01-01 RX ORDER — PANTOPRAZOLE SODIUM 40 MG/10ML
40 INJECTION, POWDER, LYOPHILIZED, FOR SOLUTION INTRAVENOUS
Status: DISCONTINUED | OUTPATIENT
Start: 2022-01-01 | End: 2022-01-01

## 2022-01-01 RX ORDER — IPRATROPIUM BROMIDE AND ALBUTEROL SULFATE 2.5; .5 MG/3ML; MG/3ML
3 SOLUTION RESPIRATORY (INHALATION)
Status: COMPLETED | OUTPATIENT
Start: 2022-01-01 | End: 2022-01-01

## 2022-01-01 RX ORDER — BUDESONIDE AND FORMOTEROL FUMARATE DIHYDRATE 80; 4.5 UG/1; UG/1
2 AEROSOL RESPIRATORY (INHALATION)
Status: DISCONTINUED | OUTPATIENT
Start: 2022-01-01 | End: 2022-01-01 | Stop reason: HOSPADM

## 2022-01-01 RX ORDER — SUCRALFATE 1 G/1
1 TABLET ORAL
Qty: 120 TABLET | Refills: 0 | Status: ON HOLD | OUTPATIENT
Start: 2022-01-01 | End: 2022-01-01

## 2022-01-01 RX ORDER — METHYLPREDNISOLONE SODIUM SUCCINATE 40 MG/ML
40 INJECTION, POWDER, LYOPHILIZED, FOR SOLUTION INTRAMUSCULAR; INTRAVENOUS EVERY 12 HOURS
Status: DISCONTINUED | OUTPATIENT
Start: 2022-01-01 | End: 2022-01-01

## 2022-01-01 RX ORDER — BENZONATATE 100 MG/1
100 CAPSULE ORAL 3 TIMES DAILY PRN
Status: DISCONTINUED | OUTPATIENT
Start: 2022-01-01 | End: 2022-01-01 | Stop reason: HOSPADM

## 2022-01-01 RX ORDER — METOPROLOL TARTRATE 50 MG/1
50 TABLET, FILM COATED ORAL 2 TIMES DAILY WITH MEALS
Status: DISCONTINUED | OUTPATIENT
Start: 2022-01-01 | End: 2022-01-01 | Stop reason: HOSPADM

## 2022-01-01 RX ORDER — ROPINIROLE 0.25 MG/1
0.5 TABLET, FILM COATED ORAL NIGHTLY
Status: DISCONTINUED | OUTPATIENT
Start: 2022-01-01 | End: 2022-01-01 | Stop reason: HOSPADM

## 2022-01-01 RX ORDER — DOCUSATE SODIUM 100 MG/1
200 CAPSULE, LIQUID FILLED ORAL 2 TIMES DAILY
Status: DISCONTINUED | OUTPATIENT
Start: 2022-01-01 | End: 2022-01-01

## 2022-01-01 RX ORDER — ONDANSETRON 2 MG/ML
8 INJECTION INTRAMUSCULAR; INTRAVENOUS ONCE
Status: COMPLETED | OUTPATIENT
Start: 2022-01-01 | End: 2022-01-01

## 2022-01-01 RX ORDER — POTASSIUM CHLORIDE 20 MEQ/1
40 TABLET, EXTENDED RELEASE ORAL AS NEEDED
Status: DISCONTINUED | OUTPATIENT
Start: 2022-01-01 | End: 2022-01-01 | Stop reason: HOSPADM

## 2022-01-01 RX ORDER — SORBITOL SOLUTION 70 %
30 SOLUTION, ORAL MISCELLANEOUS DAILY
Status: DISCONTINUED | OUTPATIENT
Start: 2022-01-01 | End: 2022-01-01 | Stop reason: HOSPADM

## 2022-01-01 RX ORDER — OXYCODONE AND ACETAMINOPHEN 10; 325 MG/1; MG/1
1 TABLET ORAL EVERY 4 HOURS PRN
Qty: 180 TABLET | Refills: 0 | Status: SHIPPED | OUTPATIENT
Start: 2022-01-01 | End: 2022-01-01 | Stop reason: HOSPADM

## 2022-01-01 RX ORDER — APIXABAN 5 MG/1
TABLET, FILM COATED ORAL
Qty: 180 TABLET | Refills: 3 | Status: SHIPPED | OUTPATIENT
Start: 2022-01-01

## 2022-01-01 RX ORDER — PANTOPRAZOLE SODIUM 40 MG/10ML
40 INJECTION, POWDER, LYOPHILIZED, FOR SOLUTION INTRAVENOUS ONCE
Status: DISCONTINUED | OUTPATIENT
Start: 2022-01-01 | End: 2022-01-01

## 2022-01-01 RX ORDER — ACETAMINOPHEN 650 MG/1
650 SUPPOSITORY RECTAL ONCE AS NEEDED
Status: DISCONTINUED | OUTPATIENT
Start: 2022-01-01 | End: 2022-01-01 | Stop reason: HOSPADM

## 2022-01-01 RX ORDER — INSULIN LISPRO 100 [IU]/ML
0-24 INJECTION, SOLUTION INTRAVENOUS; SUBCUTANEOUS
Status: DISCONTINUED | OUTPATIENT
Start: 2022-01-01 | End: 2022-01-01 | Stop reason: HOSPADM

## 2022-01-01 RX ORDER — HYDROXYZINE HYDROCHLORIDE 25 MG/1
25 TABLET, FILM COATED ORAL DAILY PRN
COMMUNITY
End: 2022-01-01 | Stop reason: HOSPADM

## 2022-01-01 RX ORDER — OXYCODONE HYDROCHLORIDE 5 MG/1
7.5 TABLET ORAL EVERY 4 HOURS PRN
Status: DISCONTINUED | OUTPATIENT
Start: 2022-01-01 | End: 2022-01-01

## 2022-01-01 RX ORDER — INSULIN LISPRO 100 [IU]/ML
0-7 INJECTION, SOLUTION INTRAVENOUS; SUBCUTANEOUS AS NEEDED
Status: DISCONTINUED | OUTPATIENT
Start: 2022-01-01 | End: 2022-01-01

## 2022-01-01 RX ORDER — ATORVASTATIN CALCIUM 40 MG/1
40 TABLET, FILM COATED ORAL DAILY
Status: DISCONTINUED | OUTPATIENT
Start: 2022-01-01 | End: 2022-01-01 | Stop reason: HOSPADM

## 2022-01-01 RX ORDER — ONDANSETRON 4 MG/1
4 TABLET, FILM COATED ORAL EVERY 6 HOURS PRN
Status: DISCONTINUED | OUTPATIENT
Start: 2022-01-01 | End: 2022-01-01 | Stop reason: SDUPTHER

## 2022-01-01 RX ORDER — SUCRALFATE 1 G/1
1 TABLET ORAL
Qty: 120 TABLET | Refills: 0 | Status: SHIPPED | OUTPATIENT
Start: 2022-01-01 | End: 2022-01-01 | Stop reason: SDUPTHER

## 2022-01-01 RX ORDER — DOCUSATE SODIUM 100 MG/1
200 CAPSULE, LIQUID FILLED ORAL 2 TIMES DAILY
Status: DISCONTINUED | OUTPATIENT
Start: 2022-01-01 | End: 2022-01-01 | Stop reason: HOSPADM

## 2022-01-01 RX ORDER — INSULIN LISPRO 100 [IU]/ML
2-9 INJECTION, SOLUTION INTRAVENOUS; SUBCUTANEOUS
Status: DISCONTINUED | OUTPATIENT
Start: 2022-01-01 | End: 2022-01-01 | Stop reason: HOSPADM

## 2022-01-01 RX ORDER — ARFORMOTEROL TARTRATE 15 UG/2ML
15 SOLUTION RESPIRATORY (INHALATION)
Status: DISCONTINUED | OUTPATIENT
Start: 2022-01-01 | End: 2022-01-01

## 2022-01-01 RX ORDER — ROPINIROLE 1 MG/1
1 TABLET, FILM COATED ORAL NIGHTLY
Status: DISCONTINUED | OUTPATIENT
Start: 2022-01-01 | End: 2022-01-01 | Stop reason: HOSPADM

## 2022-01-01 RX ORDER — ASPIRIN 81 MG/1
81 TABLET ORAL 3 TIMES WEEKLY
Qty: 30 TABLET | Refills: 5 | Status: SHIPPED | OUTPATIENT
Start: 2022-01-01 | End: 2022-01-01 | Stop reason: SDUPTHER

## 2022-01-01 RX ORDER — CEFUROXIME AXETIL 500 MG/1
500 TABLET ORAL EVERY 12 HOURS SCHEDULED
Status: DISCONTINUED | OUTPATIENT
Start: 2022-01-01 | End: 2022-01-01 | Stop reason: HOSPADM

## 2022-01-01 RX ORDER — BUDESONIDE AND FORMOTEROL FUMARATE DIHYDRATE 80; 4.5 UG/1; UG/1
2 AEROSOL RESPIRATORY (INHALATION)
Status: DISCONTINUED | OUTPATIENT
Start: 2022-01-01 | End: 2022-01-01

## 2022-01-01 RX ORDER — CALCIUM GLUCONATE 20 MG/ML
2 INJECTION, SOLUTION INTRAVENOUS EVERY 12 HOURS
Status: COMPLETED | OUTPATIENT
Start: 2022-01-01 | End: 2022-01-01

## 2022-01-01 RX ORDER — IPRATROPIUM BROMIDE AND ALBUTEROL SULFATE 2.5; .5 MG/3ML; MG/3ML
3 SOLUTION RESPIRATORY (INHALATION) ONCE
Status: COMPLETED | OUTPATIENT
Start: 2022-01-01 | End: 2022-01-01

## 2022-01-01 RX ORDER — METOCLOPRAMIDE HYDROCHLORIDE 5 MG/ML
INJECTION INTRAMUSCULAR; INTRAVENOUS AS NEEDED
Status: DISCONTINUED | OUTPATIENT
Start: 2022-01-01 | End: 2022-01-01 | Stop reason: SURG

## 2022-01-01 RX ORDER — GLIPIZIDE 5 MG/1
5 TABLET ORAL
Status: DISCONTINUED | OUTPATIENT
Start: 2022-01-01 | End: 2022-01-01

## 2022-01-01 RX ORDER — ROPINIROLE 1 MG/1
1 TABLET, FILM COATED ORAL DAILY
Status: DISCONTINUED | OUTPATIENT
Start: 2022-01-01 | End: 2022-01-01 | Stop reason: HOSPADM

## 2022-01-01 RX ORDER — MAGNESIUM SULFATE HEPTAHYDRATE 40 MG/ML
2 INJECTION, SOLUTION INTRAVENOUS AS NEEDED
Status: DISCONTINUED | OUTPATIENT
Start: 2022-01-01 | End: 2022-01-01 | Stop reason: HOSPADM

## 2022-01-01 RX ORDER — CEFUROXIME AXETIL 500 MG/1
500 TABLET ORAL EVERY 12 HOURS SCHEDULED
Qty: 2 TABLET | Refills: 0 | Status: SHIPPED | OUTPATIENT
Start: 2022-01-01 | End: 2022-01-01

## 2022-01-01 RX ORDER — CEPHALEXIN 500 MG/1
500 CAPSULE ORAL EVERY 8 HOURS SCHEDULED
Qty: 60 CAPSULE | Refills: 0 | Status: SHIPPED | OUTPATIENT
Start: 2022-01-01 | End: 2022-01-01 | Stop reason: HOSPADM

## 2022-01-01 RX ORDER — LORAZEPAM 0.5 MG/1
0.5 TABLET ORAL EVERY 12 HOURS SCHEDULED
Status: DISCONTINUED | OUTPATIENT
Start: 2022-01-01 | End: 2022-01-01

## 2022-01-01 RX ORDER — PREDNISONE 20 MG/1
20 TABLET ORAL DAILY
Status: COMPLETED | OUTPATIENT
Start: 2022-01-01 | End: 2022-01-01

## 2022-01-01 RX ORDER — NALOXONE HCL 0.4 MG/ML
0.4 VIAL (ML) INJECTION AS NEEDED
Status: DISCONTINUED | OUTPATIENT
Start: 2022-01-01 | End: 2022-01-01 | Stop reason: HOSPADM

## 2022-01-01 RX ORDER — FAMOTIDINE 20 MG/1
20 TABLET, FILM COATED ORAL
Qty: 60 TABLET | Refills: 0 | Status: SHIPPED | OUTPATIENT
Start: 2022-01-01 | End: 2022-01-01 | Stop reason: SDUPTHER

## 2022-01-01 RX ORDER — LIDOCAINE HYDROCHLORIDE 10 MG/ML
INJECTION, SOLUTION INFILTRATION; PERINEURAL AS NEEDED
Status: COMPLETED | OUTPATIENT
Start: 2022-01-01 | End: 2022-01-01

## 2022-01-01 RX ORDER — PSEUDOEPHEDRINE HCL 30 MG
100 TABLET ORAL 2 TIMES DAILY
Status: ON HOLD
Start: 2022-01-01 | End: 2022-01-01

## 2022-01-01 RX ORDER — ONDANSETRON 2 MG/ML
4 INJECTION INTRAMUSCULAR; INTRAVENOUS EVERY 6 HOURS
Status: DISCONTINUED | OUTPATIENT
Start: 2022-01-01 | End: 2022-01-01

## 2022-01-01 RX ORDER — LISINOPRIL 5 MG/1
2.5 TABLET ORAL DAILY
Status: DISCONTINUED | OUTPATIENT
Start: 2022-01-01 | End: 2022-01-01 | Stop reason: HOSPADM

## 2022-01-01 RX ORDER — IPRATROPIUM BROMIDE AND ALBUTEROL SULFATE 2.5; .5 MG/3ML; MG/3ML
3 SOLUTION RESPIRATORY (INHALATION)
Qty: 360 ML | Refills: 3 | Status: ON HOLD | OUTPATIENT
Start: 2022-01-01 | End: 2022-01-01

## 2022-01-01 RX ORDER — PREGABALIN 150 MG/1
150 CAPSULE ORAL 2 TIMES DAILY
COMMUNITY
End: 2022-01-01 | Stop reason: SDUPTHER

## 2022-01-01 RX ORDER — IPRATROPIUM BROMIDE AND ALBUTEROL SULFATE 2.5; .5 MG/3ML; MG/3ML
3 SOLUTION RESPIRATORY (INHALATION) EVERY 6 HOURS PRN
Status: DISCONTINUED | OUTPATIENT
Start: 2022-01-01 | End: 2022-01-01 | Stop reason: HOSPADM

## 2022-01-01 RX ORDER — METHYLPREDNISOLONE SODIUM SUCCINATE 40 MG/ML
20 INJECTION, POWDER, LYOPHILIZED, FOR SOLUTION INTRAMUSCULAR; INTRAVENOUS EVERY 12 HOURS
Status: DISCONTINUED | OUTPATIENT
Start: 2022-01-01 | End: 2022-01-01 | Stop reason: HOSPADM

## 2022-01-01 RX ORDER — FAMOTIDINE 10 MG/ML
20 INJECTION, SOLUTION INTRAVENOUS EVERY 12 HOURS SCHEDULED
Status: DISCONTINUED | OUTPATIENT
Start: 2022-01-01 | End: 2022-01-01 | Stop reason: HOSPADM

## 2022-01-01 RX ORDER — FUROSEMIDE 40 MG/1
40 TABLET ORAL EVERY MORNING
Status: DISCONTINUED | OUTPATIENT
Start: 2022-01-01 | End: 2022-01-01 | Stop reason: HOSPADM

## 2022-01-01 RX ORDER — ACETAMINOPHEN 325 MG/1
650 TABLET ORAL ONCE AS NEEDED
Status: COMPLETED | OUTPATIENT
Start: 2022-01-01 | End: 2022-01-01

## 2022-01-01 RX ORDER — NYSTATIN 100000 [USP'U]/G
POWDER TOPICAL EVERY 12 HOURS SCHEDULED
Status: DISCONTINUED | OUTPATIENT
Start: 2022-01-01 | End: 2022-01-01 | Stop reason: HOSPADM

## 2022-01-01 RX ORDER — SODIUM CHLORIDE, SODIUM LACTATE, POTASSIUM CHLORIDE, AND CALCIUM CHLORIDE .6; .31; .03; .02 G/100ML; G/100ML; G/100ML; G/100ML
20 INJECTION, SOLUTION INTRAVENOUS CONTINUOUS
Status: DISCONTINUED | OUTPATIENT
Start: 2022-01-01 | End: 2022-01-01 | Stop reason: HOSPADM

## 2022-01-01 RX ORDER — LORAZEPAM 0.5 MG/1
0.25 TABLET ORAL 2 TIMES DAILY PRN
Status: DISCONTINUED | OUTPATIENT
Start: 2022-01-01 | End: 2022-01-01 | Stop reason: HOSPADM

## 2022-01-01 RX ORDER — DOCUSATE SODIUM 100 MG/1
200 CAPSULE, LIQUID FILLED ORAL 2 TIMES DAILY
COMMUNITY

## 2022-01-01 RX ORDER — BENZONATATE 100 MG/1
200 CAPSULE ORAL 3 TIMES DAILY PRN
Status: DISCONTINUED | OUTPATIENT
Start: 2022-01-01 | End: 2022-01-01 | Stop reason: HOSPADM

## 2022-01-01 RX ORDER — POTASSIUM CHLORIDE 1.5 G/1.58G
40 POWDER, FOR SOLUTION ORAL AS NEEDED
Status: DISCONTINUED | OUTPATIENT
Start: 2022-01-01 | End: 2022-01-01 | Stop reason: HOSPADM

## 2022-01-01 RX ORDER — KETOROLAC TROMETHAMINE 30 MG/ML
15 INJECTION, SOLUTION INTRAMUSCULAR; INTRAVENOUS EVERY 6 HOURS PRN
Status: DISCONTINUED | OUTPATIENT
Start: 2022-01-01 | End: 2022-01-01 | Stop reason: HOSPADM

## 2022-01-01 RX ORDER — ROPINIROLE 0.25 MG/1
0.5 TABLET, FILM COATED ORAL NIGHTLY
Status: DISCONTINUED | OUTPATIENT
Start: 2022-01-01 | End: 2022-01-01

## 2022-01-01 RX ORDER — SODIUM CHLORIDE, SODIUM LACTATE, POTASSIUM CHLORIDE, CALCIUM CHLORIDE 600; 310; 30; 20 MG/100ML; MG/100ML; MG/100ML; MG/100ML
125 INJECTION, SOLUTION INTRAVENOUS CONTINUOUS
Status: DISCONTINUED | OUTPATIENT
Start: 2022-01-01 | End: 2022-01-01 | Stop reason: HOSPADM

## 2022-01-01 RX ORDER — IPRATROPIUM BROMIDE AND ALBUTEROL SULFATE 2.5; .5 MG/3ML; MG/3ML
3 SOLUTION RESPIRATORY (INHALATION) 4 TIMES DAILY PRN
COMMUNITY

## 2022-01-01 RX ORDER — INSULIN LISPRO 100 [IU]/ML
2-9 INJECTION, SOLUTION INTRAVENOUS; SUBCUTANEOUS EVERY 6 HOURS SCHEDULED
Status: DISCONTINUED | OUTPATIENT
Start: 2022-01-01 | End: 2022-01-01

## 2022-01-01 RX ORDER — ROPINIROLE 1 MG/1
1 TABLET, FILM COATED ORAL DAILY
COMMUNITY
Start: 2022-01-01

## 2022-01-01 RX ORDER — IPRATROPIUM BROMIDE AND ALBUTEROL SULFATE 2.5; .5 MG/3ML; MG/3ML
3 SOLUTION RESPIRATORY (INHALATION)
Status: CANCELLED | OUTPATIENT
Start: 2022-01-01

## 2022-01-01 RX ORDER — DEXAMETHASONE SODIUM PHOSPHATE 4 MG/ML
INJECTION, SOLUTION INTRA-ARTICULAR; INTRALESIONAL; INTRAMUSCULAR; INTRAVENOUS; SOFT TISSUE
Status: COMPLETED | OUTPATIENT
Start: 2022-01-01 | End: 2022-01-01

## 2022-01-01 RX ORDER — ENOXAPARIN SODIUM 150 MG/ML
120 INJECTION SUBCUTANEOUS EVERY 12 HOURS
Status: DISCONTINUED | OUTPATIENT
Start: 2022-01-01 | End: 2022-01-01 | Stop reason: HOSPADM

## 2022-01-01 RX ORDER — OXYCODONE HYDROCHLORIDE 5 MG/1
5 TABLET ORAL EVERY 4 HOURS PRN
Status: DISCONTINUED | OUTPATIENT
Start: 2022-01-01 | End: 2022-01-01 | Stop reason: HOSPADM

## 2022-01-01 RX ORDER — PREDNISONE 10 MG/1
10 TABLET ORAL DAILY
Qty: 4 TABLET | Refills: 0 | Status: SHIPPED | OUTPATIENT
Start: 2022-01-01 | End: 2022-01-01

## 2022-01-01 RX ORDER — METOCLOPRAMIDE 5 MG/1
5 TABLET ORAL EVERY 6 HOURS
Status: DISCONTINUED | OUTPATIENT
Start: 2022-01-01 | End: 2022-01-01 | Stop reason: HOSPADM

## 2022-01-01 RX ORDER — SODIUM CHLORIDE, SODIUM LACTATE, POTASSIUM CHLORIDE, CALCIUM CHLORIDE 600; 310; 30; 20 MG/100ML; MG/100ML; MG/100ML; MG/100ML
150 INJECTION, SOLUTION INTRAVENOUS CONTINUOUS
Status: DISCONTINUED | OUTPATIENT
Start: 2022-01-01 | End: 2022-01-01

## 2022-01-01 RX ORDER — FAMOTIDINE 20 MG/1
20 TABLET, FILM COATED ORAL
Qty: 60 TABLET | Refills: 0 | Status: SHIPPED | OUTPATIENT
Start: 2022-01-01 | End: 2022-01-01 | Stop reason: HOSPADM

## 2022-01-01 RX ORDER — ACETAMINOPHEN 160 MG/5ML
650 SOLUTION ORAL EVERY 4 HOURS PRN
Status: DISCONTINUED | OUTPATIENT
Start: 2022-01-01 | End: 2022-01-01 | Stop reason: HOSPADM

## 2022-01-01 RX ORDER — ALBUTEROL SULFATE 2.5 MG/3ML
2.5 SOLUTION RESPIRATORY (INHALATION) EVERY 4 HOURS PRN
Status: DISCONTINUED | OUTPATIENT
Start: 2022-01-01 | End: 2022-01-01 | Stop reason: HOSPADM

## 2022-01-01 RX ORDER — ACETAMINOPHEN 325 MG/1
650 TABLET ORAL ONCE AS NEEDED
Status: DISCONTINUED | OUTPATIENT
Start: 2022-01-01 | End: 2022-01-01 | Stop reason: HOSPADM

## 2022-01-01 RX ORDER — MAGNESIUM SULFATE HEPTAHYDRATE 40 MG/ML
2 INJECTION, SOLUTION INTRAVENOUS ONCE
Status: COMPLETED | OUTPATIENT
Start: 2022-01-01 | End: 2022-01-01

## 2022-01-01 RX ORDER — BUDESONIDE 0.5 MG/2ML
0.5 INHALANT ORAL
Qty: 120 ML | Refills: 6 | Status: SHIPPED | OUTPATIENT
Start: 2022-01-01 | End: 2023-01-01

## 2022-01-01 RX ORDER — PANTOPRAZOLE SODIUM 40 MG/10ML
40 INJECTION, POWDER, LYOPHILIZED, FOR SOLUTION INTRAVENOUS ONCE
Status: COMPLETED | OUTPATIENT
Start: 2022-01-01 | End: 2022-01-01

## 2022-01-01 RX ORDER — FENTANYL CITRATE 50 UG/ML
INJECTION, SOLUTION INTRAMUSCULAR; INTRAVENOUS
Status: COMPLETED | OUTPATIENT
Start: 2022-01-01 | End: 2022-01-01

## 2022-01-01 RX ORDER — LORAZEPAM 2 MG/ML
1 INJECTION INTRAMUSCULAR ONCE
Status: COMPLETED | OUTPATIENT
Start: 2022-01-01 | End: 2022-01-01

## 2022-01-01 RX ORDER — FLUMAZENIL 0.1 MG/ML
0.5 INJECTION INTRAVENOUS AS NEEDED
Status: DISCONTINUED | OUTPATIENT
Start: 2022-01-01 | End: 2022-01-01 | Stop reason: HOSPADM

## 2022-01-01 RX ORDER — POLYETHYLENE GLYCOL 3350 17 G/17G
17 POWDER, FOR SOLUTION ORAL DAILY
Status: DISCONTINUED | OUTPATIENT
Start: 2022-01-01 | End: 2022-01-01 | Stop reason: HOSPADM

## 2022-01-01 RX ORDER — TRAZODONE HYDROCHLORIDE 50 MG/1
50 TABLET ORAL NIGHTLY PRN
Status: DISCONTINUED | OUTPATIENT
Start: 2022-01-01 | End: 2022-01-01 | Stop reason: HOSPADM

## 2022-01-01 RX ORDER — RANOLAZINE 1000 MG/1
1000 TABLET, EXTENDED RELEASE ORAL EVERY 12 HOURS SCHEDULED
COMMUNITY
Start: 2022-01-01

## 2022-01-01 RX ORDER — GLIMEPIRIDE 2 MG/1
2 TABLET ORAL DAILY
COMMUNITY
Start: 2022-01-01

## 2022-01-01 RX ORDER — ISOSORBIDE MONONITRATE 30 MG/1
60 TABLET, EXTENDED RELEASE ORAL
Status: DISCONTINUED | OUTPATIENT
Start: 2022-01-01 | End: 2022-01-01

## 2022-01-01 RX ORDER — ONDANSETRON 4 MG/1
4 TABLET, FILM COATED ORAL EVERY 6 HOURS PRN
Status: DISCONTINUED | OUTPATIENT
Start: 2022-01-01 | End: 2022-01-01

## 2022-01-01 RX ORDER — ALBUTEROL SULFATE 2.5 MG/3ML
2.5 SOLUTION RESPIRATORY (INHALATION) EVERY 6 HOURS PRN
Status: DISCONTINUED | OUTPATIENT
Start: 2022-01-01 | End: 2022-01-01

## 2022-01-01 RX ORDER — ONDANSETRON 2 MG/ML
4 INJECTION INTRAMUSCULAR; INTRAVENOUS EVERY 6 HOURS PRN
Status: DISCONTINUED | OUTPATIENT
Start: 2022-01-01 | End: 2022-01-01 | Stop reason: SDUPTHER

## 2022-01-01 RX ORDER — NEOSTIGMINE METHYLSULFATE 5 MG/5 ML
SYRINGE (ML) INTRAVENOUS AS NEEDED
Status: DISCONTINUED | OUTPATIENT
Start: 2022-01-01 | End: 2022-01-01 | Stop reason: SURG

## 2022-01-01 RX ORDER — RANOLAZINE 500 MG/1
1000 TABLET, EXTENDED RELEASE ORAL 2 TIMES DAILY
Status: DISCONTINUED | OUTPATIENT
Start: 2022-01-01 | End: 2022-01-01

## 2022-01-01 RX ORDER — METHYLPREDNISOLONE SODIUM SUCCINATE 125 MG/2ML
60 INJECTION, POWDER, LYOPHILIZED, FOR SOLUTION INTRAMUSCULAR; INTRAVENOUS EVERY 6 HOURS
Status: DISCONTINUED | OUTPATIENT
Start: 2022-01-01 | End: 2022-01-01

## 2022-01-01 RX ORDER — MAGNESIUM SULFATE HEPTAHYDRATE 40 MG/ML
4 INJECTION, SOLUTION INTRAVENOUS AS NEEDED
Status: DISCONTINUED | OUTPATIENT
Start: 2022-01-01 | End: 2022-01-01 | Stop reason: HOSPADM

## 2022-01-01 RX ORDER — ALBUTEROL SULFATE 2.5 MG/3ML
2.5 SOLUTION RESPIRATORY (INHALATION) ONCE
Status: COMPLETED | OUTPATIENT
Start: 2022-01-01 | End: 2022-01-01

## 2022-01-01 RX ORDER — FAMOTIDINE 10 MG/ML
20 INJECTION, SOLUTION INTRAVENOUS ONCE
Status: COMPLETED | OUTPATIENT
Start: 2022-01-01 | End: 2022-01-01

## 2022-01-01 RX ORDER — INSULIN LISPRO 100 [IU]/ML
0-7 INJECTION, SOLUTION INTRAVENOUS; SUBCUTANEOUS
Status: DISCONTINUED | OUTPATIENT
Start: 2022-01-01 | End: 2022-01-01 | Stop reason: HOSPADM

## 2022-01-01 RX ORDER — HYDROXYZINE HYDROCHLORIDE 25 MG/1
25 TABLET, FILM COATED ORAL NIGHTLY PRN
COMMUNITY
Start: 2022-01-01 | End: 2022-01-01 | Stop reason: HOSPADM

## 2022-01-01 RX ORDER — METOCLOPRAMIDE HYDROCHLORIDE 5 MG/ML
5 INJECTION INTRAMUSCULAR; INTRAVENOUS EVERY 6 HOURS
Status: DISCONTINUED | OUTPATIENT
Start: 2022-01-01 | End: 2022-01-01

## 2022-01-01 RX ORDER — METOLAZONE 5 MG/1
5 TABLET ORAL
Status: DISCONTINUED | OUTPATIENT
Start: 2022-01-01 | End: 2022-01-01 | Stop reason: HOSPADM

## 2022-01-01 RX ORDER — MEPERIDINE HYDROCHLORIDE 25 MG/ML
12.5 INJECTION INTRAMUSCULAR; INTRAVENOUS; SUBCUTANEOUS
Status: DISCONTINUED | OUTPATIENT
Start: 2022-01-01 | End: 2022-01-01 | Stop reason: HOSPADM

## 2022-01-01 RX ORDER — HYDROCODONE BITARTRATE AND ACETAMINOPHEN 7.5; 325 MG/1; MG/1
1 TABLET ORAL ONCE
Status: COMPLETED | OUTPATIENT
Start: 2022-01-01 | End: 2022-01-01

## 2022-01-01 RX ORDER — FUROSEMIDE 20 MG/1
20 TABLET ORAL EVERY MORNING
Status: DISCONTINUED | OUTPATIENT
Start: 2022-01-01 | End: 2022-01-01

## 2022-01-01 RX ORDER — VANCOMYCIN HYDROCHLORIDE 1 G/20ML
INJECTION, POWDER, LYOPHILIZED, FOR SOLUTION INTRAVENOUS AS NEEDED
Status: DISCONTINUED | OUTPATIENT
Start: 2022-01-01 | End: 2022-01-01 | Stop reason: HOSPADM

## 2022-01-01 RX ORDER — POTASSIUM CHLORIDE 7.45 MG/ML
10 INJECTION INTRAVENOUS
Status: DISCONTINUED | OUTPATIENT
Start: 2022-01-01 | End: 2022-01-01 | Stop reason: HOSPADM

## 2022-01-01 RX ORDER — PREDNISONE 20 MG/1
40 TABLET ORAL DAILY
Status: DISCONTINUED | OUTPATIENT
Start: 2022-01-01 | End: 2022-01-01

## 2022-01-01 RX ORDER — IPRATROPIUM BROMIDE AND ALBUTEROL SULFATE 2.5; .5 MG/3ML; MG/3ML
3 SOLUTION RESPIRATORY (INHALATION)
Status: DISCONTINUED | OUTPATIENT
Start: 2022-01-01 | End: 2022-01-01 | Stop reason: SDUPTHER

## 2022-01-01 RX ORDER — INSULIN LISPRO 100 [IU]/ML
5 INJECTION, SOLUTION INTRAVENOUS; SUBCUTANEOUS
Status: DISCONTINUED | OUTPATIENT
Start: 2022-01-01 | End: 2022-01-01 | Stop reason: HOSPADM

## 2022-01-01 RX ADMIN — METOPROLOL TARTRATE 50 MG: 50 TABLET, FILM COATED ORAL at 07:51

## 2022-01-01 RX ADMIN — HYDROMORPHONE HYDROCHLORIDE 0.25 MG: 1 INJECTION, SOLUTION INTRAMUSCULAR; INTRAVENOUS; SUBCUTANEOUS at 02:08

## 2022-01-01 RX ADMIN — INSULIN LISPRO 4 UNITS: 100 INJECTION, SOLUTION INTRAVENOUS; SUBCUTANEOUS at 14:04

## 2022-01-01 RX ADMIN — RANOLAZINE 1000 MG: 500 TABLET, FILM COATED, EXTENDED RELEASE ORAL at 08:17

## 2022-01-01 RX ADMIN — METOPROLOL TARTRATE 25 MG: 25 TABLET, FILM COATED ORAL at 21:25

## 2022-01-01 RX ADMIN — DOCUSATE SODIUM 200 MG: 100 CAPSULE, LIQUID FILLED ORAL at 09:28

## 2022-01-01 RX ADMIN — DEXAMETHASONE SODIUM PHOSPHATE 4 MG: 4 INJECTION, SOLUTION INTRAMUSCULAR; INTRAVENOUS at 07:24

## 2022-01-01 RX ADMIN — APIXABAN 5 MG: 5 TABLET, FILM COATED ORAL at 09:06

## 2022-01-01 RX ADMIN — FAMOTIDINE 20 MG: 20 TABLET ORAL at 18:05

## 2022-01-01 RX ADMIN — METOPROLOL TARTRATE 25 MG: 25 TABLET, FILM COATED ORAL at 08:17

## 2022-01-01 RX ADMIN — ATORVASTATIN CALCIUM 40 MG: 40 TABLET, FILM COATED ORAL at 08:18

## 2022-01-01 RX ADMIN — NASAL DECONGESTANT 2 SPRAY: 0.05 SPRAY NASAL at 07:31

## 2022-01-01 RX ADMIN — Medication 2000 MG: at 03:15

## 2022-01-01 RX ADMIN — METOPROLOL TARTRATE 50 MG: 50 TABLET, FILM COATED ORAL at 08:35

## 2022-01-01 RX ADMIN — HYDROMORPHONE HYDROCHLORIDE 0.25 MG: 1 INJECTION, SOLUTION INTRAMUSCULAR; INTRAVENOUS; SUBCUTANEOUS at 13:44

## 2022-01-01 RX ADMIN — FAMOTIDINE 20 MG: 20 TABLET, FILM COATED ORAL at 07:44

## 2022-01-01 RX ADMIN — HYDROMORPHONE HYDROCHLORIDE 0.5 MG: 1 INJECTION, SOLUTION INTRAMUSCULAR; INTRAVENOUS; SUBCUTANEOUS at 05:03

## 2022-01-01 RX ADMIN — HYDROMORPHONE HYDROCHLORIDE 0.5 MG: 1 INJECTION, SOLUTION INTRAMUSCULAR; INTRAVENOUS; SUBCUTANEOUS at 09:52

## 2022-01-01 RX ADMIN — PREGABALIN 150 MG: 75 CAPSULE ORAL at 21:06

## 2022-01-01 RX ADMIN — IPRATROPIUM BROMIDE AND ALBUTEROL SULFATE 3 ML: .5; 3 SOLUTION RESPIRATORY (INHALATION) at 18:28

## 2022-01-01 RX ADMIN — INSULIN LISPRO 16 UNITS: 100 INJECTION, SOLUTION INTRAVENOUS; SUBCUTANEOUS at 07:57

## 2022-01-01 RX ADMIN — LORAZEPAM 0.5 MG: 0.5 TABLET ORAL at 21:51

## 2022-01-01 RX ADMIN — Medication 3 ML: at 08:50

## 2022-01-01 RX ADMIN — GLIPIZIDE 5 MG: 5 TABLET ORAL at 10:57

## 2022-01-01 RX ADMIN — PROPOFOL 30 MG: 10 INJECTION, EMULSION INTRAVENOUS at 08:36

## 2022-01-01 RX ADMIN — CEFTRIAXONE 2 G: 2 INJECTION, POWDER, FOR SOLUTION INTRAMUSCULAR; INTRAVENOUS at 17:20

## 2022-01-01 RX ADMIN — PREGABALIN 150 MG: 75 CAPSULE ORAL at 20:31

## 2022-01-01 RX ADMIN — DOCUSATE SODIUM 100 MG: 100 CAPSULE, LIQUID FILLED ORAL at 20:01

## 2022-01-01 RX ADMIN — BUDESONIDE 0.5 MG: 0.5 INHALANT RESPIRATORY (INHALATION) at 06:43

## 2022-01-01 RX ADMIN — ALBUTEROL SULFATE 2.5 MG: 2.5 SOLUTION RESPIRATORY (INHALATION) at 04:30

## 2022-01-01 RX ADMIN — BENZONATATE 100 MG: 100 CAPSULE ORAL at 17:08

## 2022-01-01 RX ADMIN — DOCUSATE SODIUM 200 MG: 100 CAPSULE, LIQUID FILLED ORAL at 08:46

## 2022-01-01 RX ADMIN — ATORVASTATIN CALCIUM 40 MG: 40 TABLET, FILM COATED ORAL at 08:17

## 2022-01-01 RX ADMIN — Medication 10 ML: at 08:20

## 2022-01-01 RX ADMIN — GLIPIZIDE 5 MG: 5 TABLET ORAL at 08:02

## 2022-01-01 RX ADMIN — INSULIN LISPRO 4 UNITS: 100 INJECTION, SOLUTION INTRAVENOUS; SUBCUTANEOUS at 08:25

## 2022-01-01 RX ADMIN — TRAZODONE HYDROCHLORIDE 100 MG: 100 TABLET ORAL at 19:56

## 2022-01-01 RX ADMIN — OXYCODONE 10 MG: 5 TABLET ORAL at 05:05

## 2022-01-01 RX ADMIN — METOLAZONE 5 MG: 5 TABLET ORAL at 09:33

## 2022-01-01 RX ADMIN — DOCUSATE SODIUM 200 MG: 100 CAPSULE, LIQUID FILLED ORAL at 19:55

## 2022-01-01 RX ADMIN — IPRATROPIUM BROMIDE AND ALBUTEROL SULFATE 3 ML: .5; 3 SOLUTION RESPIRATORY (INHALATION) at 13:33

## 2022-01-01 RX ADMIN — PROPOFOL 50 MG: 10 INJECTION, EMULSION INTRAVENOUS at 13:16

## 2022-01-01 RX ADMIN — HYDROMORPHONE HYDROCHLORIDE 0.5 MG: 1 INJECTION, SOLUTION INTRAMUSCULAR; INTRAVENOUS; SUBCUTANEOUS at 02:13

## 2022-01-01 RX ADMIN — INSULIN LISPRO 10 UNITS: 100 INJECTION, SOLUTION INTRAVENOUS; SUBCUTANEOUS at 17:54

## 2022-01-01 RX ADMIN — HYDROMORPHONE HYDROCHLORIDE 0.25 MG: 1 INJECTION, SOLUTION INTRAMUSCULAR; INTRAVENOUS; SUBCUTANEOUS at 15:30

## 2022-01-01 RX ADMIN — ROPINIROLE HYDROCHLORIDE 0.5 MG: 0.25 TABLET, FILM COATED ORAL at 20:36

## 2022-01-01 RX ADMIN — ISOSORBIDE MONONITRATE 120 MG: 60 TABLET, EXTENDED RELEASE ORAL at 08:02

## 2022-01-01 RX ADMIN — INSULIN GLARGINE 10 UNITS: 100 INJECTION, SOLUTION SUBCUTANEOUS at 20:34

## 2022-01-01 RX ADMIN — GUAIFENESIN 600 MG: 600 TABLET, EXTENDED RELEASE ORAL at 08:11

## 2022-01-01 RX ADMIN — PREGABALIN 150 MG: 75 CAPSULE ORAL at 08:17

## 2022-01-01 RX ADMIN — GLIPIZIDE 5 MG: 5 TABLET ORAL at 07:35

## 2022-01-01 RX ADMIN — PREDNISONE 40 MG: 20 TABLET ORAL at 10:12

## 2022-01-01 RX ADMIN — INSULIN LISPRO 12 UNITS: 100 INJECTION, SOLUTION INTRAVENOUS; SUBCUTANEOUS at 12:35

## 2022-01-01 RX ADMIN — SUCRALFATE 1 G: 1 TABLET ORAL at 17:49

## 2022-01-01 RX ADMIN — BUDESONIDE AND FORMOTEROL FUMARATE DIHYDRATE 2 PUFF: 80; 4.5 AEROSOL RESPIRATORY (INHALATION) at 07:13

## 2022-01-01 RX ADMIN — SUCRALFATE 1 G: 1 TABLET ORAL at 11:57

## 2022-01-01 RX ADMIN — METHYLPREDNISOLONE SODIUM SUCCINATE 40 MG: 40 INJECTION, POWDER, FOR SOLUTION INTRAMUSCULAR; INTRAVENOUS at 21:57

## 2022-01-01 RX ADMIN — SODIUM CHLORIDE, POTASSIUM CHLORIDE, SODIUM LACTATE AND CALCIUM CHLORIDE 500 ML: 600; 310; 30; 20 INJECTION, SOLUTION INTRAVENOUS at 18:51

## 2022-01-01 RX ADMIN — NITROGLYCERIN 0.4 MG: 0.4 TABLET SUBLINGUAL at 17:25

## 2022-01-01 RX ADMIN — OXYCODONE 10 MG: 5 TABLET ORAL at 04:37

## 2022-01-01 RX ADMIN — ASPIRIN 81 MG: 81 TABLET, COATED ORAL at 08:44

## 2022-01-01 RX ADMIN — INSULIN HUMAN 8 UNITS: 100 INJECTION, SOLUTION PARENTERAL at 18:35

## 2022-01-01 RX ADMIN — APIXABAN 5 MG: 5 TABLET, FILM COATED ORAL at 20:25

## 2022-01-01 RX ADMIN — BUDESONIDE 0.5 MG: 0.5 INHALANT RESPIRATORY (INHALATION) at 06:54

## 2022-01-01 RX ADMIN — Medication 200 MCG: at 14:43

## 2022-01-01 RX ADMIN — Medication 3 ML: at 09:29

## 2022-01-01 RX ADMIN — HYDROMORPHONE HYDROCHLORIDE 0.5 MG: 1 INJECTION, SOLUTION INTRAMUSCULAR; INTRAVENOUS; SUBCUTANEOUS at 08:48

## 2022-01-01 RX ADMIN — FAMOTIDINE 20 MG: 20 TABLET ORAL at 07:28

## 2022-01-01 RX ADMIN — Medication 3 ML: at 22:22

## 2022-01-01 RX ADMIN — PROPOFOL 50 MG: 10 INJECTION, EMULSION INTRAVENOUS at 13:24

## 2022-01-01 RX ADMIN — RANOLAZINE 1000 MG: 500 TABLET, FILM COATED, EXTENDED RELEASE ORAL at 07:52

## 2022-01-01 RX ADMIN — ALBUTEROL SULFATE 2.5 MG: 2.5 SOLUTION RESPIRATORY (INHALATION) at 00:25

## 2022-01-01 RX ADMIN — APIXABAN 5 MG: 5 TABLET, FILM COATED ORAL at 21:07

## 2022-01-01 RX ADMIN — Medication 100 MCG: at 14:37

## 2022-01-01 RX ADMIN — HYDROMORPHONE HYDROCHLORIDE 0.5 MG: 1 INJECTION, SOLUTION INTRAMUSCULAR; INTRAVENOUS; SUBCUTANEOUS at 14:51

## 2022-01-01 RX ADMIN — METOCLOPRAMIDE 5 MG: 5 INJECTION, SOLUTION INTRAMUSCULAR; INTRAVENOUS at 07:52

## 2022-01-01 RX ADMIN — PREGABALIN 150 MG: 75 CAPSULE ORAL at 07:54

## 2022-01-01 RX ADMIN — METOLAZONE 5 MG: 5 TABLET ORAL at 08:01

## 2022-01-01 RX ADMIN — DOCUSATE SODIUM 200 MG: 100 CAPSULE, LIQUID FILLED ORAL at 09:13

## 2022-01-01 RX ADMIN — GLIPIZIDE 5 MG: 5 TABLET ORAL at 08:49

## 2022-01-01 RX ADMIN — GUAIFENESIN 600 MG: 600 TABLET, EXTENDED RELEASE ORAL at 08:32

## 2022-01-01 RX ADMIN — INSULIN LISPRO 24 UNITS: 100 INJECTION, SOLUTION INTRAVENOUS; SUBCUTANEOUS at 17:30

## 2022-01-01 RX ADMIN — OXYCODONE 10 MG: 5 TABLET ORAL at 15:20

## 2022-01-01 RX ADMIN — HYDROMORPHONE HYDROCHLORIDE 0.5 MG: 1 INJECTION, SOLUTION INTRAMUSCULAR; INTRAVENOUS; SUBCUTANEOUS at 23:31

## 2022-01-01 RX ADMIN — RANOLAZINE 1000 MG: 500 TABLET, FILM COATED, EXTENDED RELEASE ORAL at 08:18

## 2022-01-01 RX ADMIN — INSULIN LISPRO 6 UNITS: 100 INJECTION, SOLUTION INTRAVENOUS; SUBCUTANEOUS at 16:53

## 2022-01-01 RX ADMIN — SUCRALFATE 1 G: 1 TABLET ORAL at 10:51

## 2022-01-01 RX ADMIN — ATORVASTATIN CALCIUM 40 MG: 40 TABLET, FILM COATED ORAL at 06:05

## 2022-01-01 RX ADMIN — INSULIN LISPRO 7 UNITS: 100 INJECTION, SOLUTION INTRAVENOUS; SUBCUTANEOUS at 16:40

## 2022-01-01 RX ADMIN — SUCRALFATE 1 G: 1 TABLET ORAL at 21:11

## 2022-01-01 RX ADMIN — ISOSORBIDE MONONITRATE 120 MG: 30 TABLET, EXTENDED RELEASE ORAL at 09:43

## 2022-01-01 RX ADMIN — Medication 10 ML: at 12:43

## 2022-01-01 RX ADMIN — DOCUSATE SODIUM 100 MG: 100 CAPSULE, LIQUID FILLED ORAL at 09:32

## 2022-01-01 RX ADMIN — LIDOCAINE HYDROCHLORIDE: 20 SOLUTION ORAL; TOPICAL at 19:30

## 2022-01-01 RX ADMIN — INSULIN LISPRO 5 UNITS: 100 INJECTION, SOLUTION INTRAVENOUS; SUBCUTANEOUS at 11:57

## 2022-01-01 RX ADMIN — BACITRACIN 1 APPLICATION: 500 OINTMENT TOPICAL at 21:42

## 2022-01-01 RX ADMIN — LISINOPRIL 5 MG: 5 TABLET ORAL at 09:33

## 2022-01-01 RX ADMIN — NYSTATIN: 100000 POWDER TOPICAL at 12:46

## 2022-01-01 RX ADMIN — HYDROMORPHONE HYDROCHLORIDE 0.5 MG: 1 INJECTION, SOLUTION INTRAMUSCULAR; INTRAVENOUS; SUBCUTANEOUS at 19:48

## 2022-01-01 RX ADMIN — BUDESONIDE 0.5 MG: 0.5 INHALANT RESPIRATORY (INHALATION) at 20:31

## 2022-01-01 RX ADMIN — PREDNISONE 20 MG: 20 TABLET ORAL at 08:24

## 2022-01-01 RX ADMIN — SODIUM CHLORIDE 500 ML: 9 INJECTION, SOLUTION INTRAVENOUS at 14:05

## 2022-01-01 RX ADMIN — HYDROMORPHONE HYDROCHLORIDE 0.5 MG: 1 INJECTION, SOLUTION INTRAMUSCULAR; INTRAVENOUS; SUBCUTANEOUS at 16:46

## 2022-01-01 RX ADMIN — METHYLPREDNISOLONE SODIUM SUCCINATE 20 MG: 40 INJECTION, POWDER, FOR SOLUTION INTRAMUSCULAR; INTRAVENOUS at 13:18

## 2022-01-01 RX ADMIN — RANOLAZINE 500 MG: 500 TABLET, FILM COATED, EXTENDED RELEASE ORAL at 09:32

## 2022-01-01 RX ADMIN — CEFTRIAXONE 1 G: 10 INJECTION, POWDER, FOR SOLUTION INTRAVENOUS at 06:06

## 2022-01-01 RX ADMIN — BUDESONIDE 0.5 MG: 0.5 INHALANT RESPIRATORY (INHALATION) at 19:04

## 2022-01-01 RX ADMIN — RANOLAZINE 1000 MG: 500 TABLET, FILM COATED, EXTENDED RELEASE ORAL at 08:50

## 2022-01-01 RX ADMIN — HYDROMORPHONE HYDROCHLORIDE 0.5 MG: 1 INJECTION, SOLUTION INTRAMUSCULAR; INTRAVENOUS; SUBCUTANEOUS at 17:36

## 2022-01-01 RX ADMIN — IPRATROPIUM BROMIDE AND ALBUTEROL SULFATE 3 ML: .5; 3 SOLUTION RESPIRATORY (INHALATION) at 06:58

## 2022-01-01 RX ADMIN — SODIUM CHLORIDE, POTASSIUM CHLORIDE, SODIUM LACTATE AND CALCIUM CHLORIDE 125 ML/HR: 600; 310; 30; 20 INJECTION, SOLUTION INTRAVENOUS at 12:15

## 2022-01-01 RX ADMIN — CEFUROXIME AXETIL 500 MG: 500 TABLET, FILM COATED ORAL at 14:37

## 2022-01-01 RX ADMIN — PREGABALIN 150 MG: 75 CAPSULE ORAL at 08:02

## 2022-01-01 RX ADMIN — MIRTAZAPINE 30 MG: 15 TABLET, FILM COATED ORAL at 21:06

## 2022-01-01 RX ADMIN — TETROFOSMIN 1 DOSE: 1.38 INJECTION, POWDER, LYOPHILIZED, FOR SOLUTION INTRAVENOUS at 07:51

## 2022-01-01 RX ADMIN — SUCRALFATE 1 G: 1 TABLET ORAL at 21:28

## 2022-01-01 RX ADMIN — OXYCODONE 5 MG: 5 TABLET ORAL at 00:04

## 2022-01-01 RX ADMIN — CEFTRIAXONE SODIUM 2 G: 2 INJECTION, POWDER, FOR SOLUTION INTRAMUSCULAR; INTRAVENOUS at 16:43

## 2022-01-01 RX ADMIN — HYDROMORPHONE HYDROCHLORIDE 1 MG: 1 INJECTION, SOLUTION INTRAMUSCULAR; INTRAVENOUS; SUBCUTANEOUS at 09:37

## 2022-01-01 RX ADMIN — GUAIFENESIN 600 MG: 600 TABLET, EXTENDED RELEASE ORAL at 20:19

## 2022-01-01 RX ADMIN — HYDROMORPHONE HYDROCHLORIDE 0.25 MG: 1 INJECTION, SOLUTION INTRAMUSCULAR; INTRAVENOUS; SUBCUTANEOUS at 15:15

## 2022-01-01 RX ADMIN — INSULIN LISPRO 10 UNITS: 100 INJECTION, SOLUTION INTRAVENOUS; SUBCUTANEOUS at 17:25

## 2022-01-01 RX ADMIN — GUAIFENESIN 600 MG: 600 TABLET, EXTENDED RELEASE ORAL at 09:06

## 2022-01-01 RX ADMIN — BUDESONIDE 0.5 MG: 0.5 INHALANT RESPIRATORY (INHALATION) at 18:35

## 2022-01-01 RX ADMIN — Medication 100 MCG: at 14:38

## 2022-01-01 RX ADMIN — ONDANSETRON 8 MG: 2 INJECTION INTRAMUSCULAR; INTRAVENOUS at 10:33

## 2022-01-01 RX ADMIN — IPRATROPIUM BROMIDE AND ALBUTEROL SULFATE 3 ML: .5; 3 SOLUTION RESPIRATORY (INHALATION) at 01:44

## 2022-01-01 RX ADMIN — INSULIN LISPRO 4 UNITS: 100 INJECTION, SOLUTION INTRAVENOUS; SUBCUTANEOUS at 08:17

## 2022-01-01 RX ADMIN — Medication 3 ML: at 10:13

## 2022-01-01 RX ADMIN — BUDESONIDE 0.5 MG: 0.5 INHALANT RESPIRATORY (INHALATION) at 20:09

## 2022-01-01 RX ADMIN — HYDROMORPHONE HYDROCHLORIDE 0.5 MG: 1 INJECTION, SOLUTION INTRAMUSCULAR; INTRAVENOUS; SUBCUTANEOUS at 20:08

## 2022-01-01 RX ADMIN — PREGABALIN 150 MG: 75 CAPSULE ORAL at 20:40

## 2022-01-01 RX ADMIN — TRAZODONE HYDROCHLORIDE 100 MG: 100 TABLET ORAL at 21:02

## 2022-01-01 RX ADMIN — MIRTAZAPINE 30 MG: 15 TABLET, FILM COATED ORAL at 21:11

## 2022-01-01 RX ADMIN — ASPIRIN 81 MG: 81 TABLET, COATED ORAL at 08:24

## 2022-01-01 RX ADMIN — INSULIN GLARGINE 10 UNITS: 100 INJECTION, SOLUTION SUBCUTANEOUS at 08:47

## 2022-01-01 RX ADMIN — METHYLPREDNISOLONE SODIUM SUCCINATE 20 MG: 40 INJECTION, POWDER, FOR SOLUTION INTRAMUSCULAR; INTRAVENOUS at 21:52

## 2022-01-01 RX ADMIN — CEFUROXIME AXETIL 500 MG: 500 TABLET, FILM COATED ORAL at 03:34

## 2022-01-01 RX ADMIN — OXYCODONE 10 MG: 5 TABLET ORAL at 02:26

## 2022-01-01 RX ADMIN — RANOLAZINE 500 MG: 500 TABLET, FILM COATED, EXTENDED RELEASE ORAL at 08:15

## 2022-01-01 RX ADMIN — APIXABAN 5 MG: 5 TABLET, FILM COATED ORAL at 08:53

## 2022-01-01 RX ADMIN — HYDROMORPHONE HYDROCHLORIDE 0.5 MG: 1 INJECTION, SOLUTION INTRAMUSCULAR; INTRAVENOUS; SUBCUTANEOUS at 03:34

## 2022-01-01 RX ADMIN — RANOLAZINE 500 MG: 500 TABLET, FILM COATED, EXTENDED RELEASE ORAL at 08:10

## 2022-01-01 RX ADMIN — INSULIN HUMAN 8 UNITS: 100 INJECTION, SOLUTION PARENTERAL at 08:12

## 2022-01-01 RX ADMIN — HYDROMORPHONE HYDROCHLORIDE 1 MG: 1 INJECTION, SOLUTION INTRAMUSCULAR; INTRAVENOUS; SUBCUTANEOUS at 21:25

## 2022-01-01 RX ADMIN — HYDROMORPHONE HYDROCHLORIDE 0.5 MG: 1 INJECTION, SOLUTION INTRAMUSCULAR; INTRAVENOUS; SUBCUTANEOUS at 07:06

## 2022-01-01 RX ADMIN — ATORVASTATIN CALCIUM 40 MG: 40 TABLET, FILM COATED ORAL at 21:25

## 2022-01-01 RX ADMIN — BENZONATATE 100 MG: 100 CAPSULE ORAL at 06:36

## 2022-01-01 RX ADMIN — HYDROMORPHONE HYDROCHLORIDE 0.5 MG: 1 INJECTION, SOLUTION INTRAMUSCULAR; INTRAVENOUS; SUBCUTANEOUS at 19:40

## 2022-01-01 RX ADMIN — FAMOTIDINE 40 MG: 20 TABLET ORAL at 16:40

## 2022-01-01 RX ADMIN — INSULIN LISPRO 5 UNITS: 100 INJECTION, SOLUTION INTRAVENOUS; SUBCUTANEOUS at 07:44

## 2022-01-01 RX ADMIN — HYDROXYZINE HYDROCHLORIDE 25 MG: 25 TABLET ORAL at 20:36

## 2022-01-01 RX ADMIN — HYDROMORPHONE HYDROCHLORIDE 0.25 MG: 1 INJECTION, SOLUTION INTRAMUSCULAR; INTRAVENOUS; SUBCUTANEOUS at 20:44

## 2022-01-01 RX ADMIN — ATORVASTATIN CALCIUM 40 MG: 40 TABLET, FILM COATED ORAL at 21:06

## 2022-01-01 RX ADMIN — GLIPIZIDE 5 MG: 5 TABLET ORAL at 10:13

## 2022-01-01 RX ADMIN — AMPICILLIN SODIUM AND SULBACTAM SODIUM 1.5 G: 1; .5 INJECTION, POWDER, FOR SOLUTION INTRAMUSCULAR; INTRAVENOUS at 22:26

## 2022-01-01 RX ADMIN — INSULIN LISPRO 3 UNITS: 100 INJECTION, SOLUTION INTRAVENOUS; SUBCUTANEOUS at 18:01

## 2022-01-01 RX ADMIN — RANOLAZINE 1000 MG: 500 TABLET, FILM COATED, EXTENDED RELEASE ORAL at 09:28

## 2022-01-01 RX ADMIN — HYDROMORPHONE HYDROCHLORIDE 1 MG: 1 INJECTION, SOLUTION INTRAMUSCULAR; INTRAVENOUS; SUBCUTANEOUS at 05:24

## 2022-01-01 RX ADMIN — RANOLAZINE 1000 MG: 500 TABLET, FILM COATED, EXTENDED RELEASE ORAL at 21:21

## 2022-01-01 RX ADMIN — GUAIFENESIN 600 MG: 600 TABLET, EXTENDED RELEASE ORAL at 21:06

## 2022-01-01 RX ADMIN — IPRATROPIUM BROMIDE AND ALBUTEROL SULFATE 3 ML: .5; 3 SOLUTION RESPIRATORY (INHALATION) at 07:10

## 2022-01-01 RX ADMIN — GUAIFENESIN 600 MG: 600 TABLET, EXTENDED RELEASE ORAL at 12:50

## 2022-01-01 RX ADMIN — ATORVASTATIN CALCIUM 40 MG: 40 TABLET, FILM COATED ORAL at 08:50

## 2022-01-01 RX ADMIN — INSULIN LISPRO 7 UNITS: 100 INJECTION, SOLUTION INTRAVENOUS; SUBCUTANEOUS at 17:33

## 2022-01-01 RX ADMIN — FAMOTIDINE 20 MG: 20 TABLET, FILM COATED ORAL at 16:58

## 2022-01-01 RX ADMIN — METOPROLOL TARTRATE 50 MG: 50 TABLET, FILM COATED ORAL at 18:53

## 2022-01-01 RX ADMIN — ONDANSETRON 4 MG: 2 INJECTION INTRAMUSCULAR; INTRAVENOUS at 12:10

## 2022-01-01 RX ADMIN — LIDOCAINE HYDROCHLORIDE 5 ML: 10 INJECTION, SOLUTION INFILTRATION; PERINEURAL at 16:02

## 2022-01-01 RX ADMIN — HYDROMORPHONE HYDROCHLORIDE 0.25 MG: 1 INJECTION, SOLUTION INTRAMUSCULAR; INTRAVENOUS; SUBCUTANEOUS at 01:27

## 2022-01-01 RX ADMIN — HYDROMORPHONE HYDROCHLORIDE 0.5 MG: 1 INJECTION, SOLUTION INTRAMUSCULAR; INTRAVENOUS; SUBCUTANEOUS at 02:40

## 2022-01-01 RX ADMIN — BUDESONIDE AND FORMOTEROL FUMARATE DIHYDRATE 2 PUFF: 80; 4.5 AEROSOL RESPIRATORY (INHALATION) at 19:00

## 2022-01-01 RX ADMIN — ASPIRIN 81 MG: 81 TABLET, COATED ORAL at 15:25

## 2022-01-01 RX ADMIN — AMPICILLIN SODIUM AND SULBACTAM SODIUM 1.5 G: 1; .5 INJECTION, POWDER, FOR SOLUTION INTRAMUSCULAR; INTRAVENOUS at 17:54

## 2022-01-01 RX ADMIN — SODIUM CHLORIDE, POTASSIUM CHLORIDE, SODIUM LACTATE AND CALCIUM CHLORIDE 125 ML/HR: 600; 310; 30; 20 INJECTION, SOLUTION INTRAVENOUS at 07:10

## 2022-01-01 RX ADMIN — PIPERACILLIN AND TAZOBACTAM 4.5 G: 4; .5 INJECTION, POWDER, FOR SOLUTION INTRAVENOUS at 07:52

## 2022-01-01 RX ADMIN — Medication 3 ML: at 19:48

## 2022-01-01 RX ADMIN — CEFUROXIME AXETIL 500 MG: 500 TABLET, FILM COATED ORAL at 02:10

## 2022-01-01 RX ADMIN — RANOLAZINE 500 MG: 500 TABLET, FILM COATED, EXTENDED RELEASE ORAL at 09:44

## 2022-01-01 RX ADMIN — METOPROLOL TARTRATE 50 MG: 50 TABLET, FILM COATED ORAL at 08:50

## 2022-01-01 RX ADMIN — IPRATROPIUM BROMIDE AND ALBUTEROL SULFATE 3 ML: .5; 3 SOLUTION RESPIRATORY (INHALATION) at 06:46

## 2022-01-01 RX ADMIN — GLIPIZIDE 5 MG: 5 TABLET ORAL at 08:10

## 2022-01-01 RX ADMIN — BENZONATATE 100 MG: 100 CAPSULE ORAL at 21:53

## 2022-01-01 RX ADMIN — OXYCODONE 10 MG: 5 TABLET ORAL at 20:31

## 2022-01-01 RX ADMIN — INSULIN GLARGINE 10 UNITS: 100 INJECTION, SOLUTION SUBCUTANEOUS at 16:17

## 2022-01-01 RX ADMIN — RANOLAZINE 1000 MG: 500 TABLET, FILM COATED, EXTENDED RELEASE ORAL at 21:29

## 2022-01-01 RX ADMIN — HYDROMORPHONE HYDROCHLORIDE 0.5 MG: 1 INJECTION, SOLUTION INTRAMUSCULAR; INTRAVENOUS; SUBCUTANEOUS at 02:00

## 2022-01-01 RX ADMIN — Medication 3 ML: at 07:46

## 2022-01-01 RX ADMIN — BUDESONIDE 0.5 MG: 0.5 INHALANT RESPIRATORY (INHALATION) at 19:48

## 2022-01-01 RX ADMIN — AMPICILLIN SODIUM AND SULBACTAM SODIUM 1.5 G: 1; .5 INJECTION, POWDER, FOR SOLUTION INTRAMUSCULAR; INTRAVENOUS at 16:54

## 2022-01-01 RX ADMIN — RANOLAZINE 500 MG: 500 TABLET, FILM COATED, EXTENDED RELEASE ORAL at 08:17

## 2022-01-01 RX ADMIN — GUAIFENESIN 600 MG: 600 TABLET, EXTENDED RELEASE ORAL at 20:36

## 2022-01-01 RX ADMIN — PREGABALIN 150 MG: 75 CAPSULE ORAL at 20:51

## 2022-01-01 RX ADMIN — INSULIN HUMAN 8 UNITS: 100 INJECTION, SOLUTION PARENTERAL at 13:11

## 2022-01-01 RX ADMIN — APIXABAN 5 MG: 5 TABLET, FILM COATED ORAL at 08:50

## 2022-01-01 RX ADMIN — METHYLPREDNISOLONE SODIUM SUCCINATE 125 MG: 125 INJECTION, POWDER, FOR SOLUTION INTRAMUSCULAR; INTRAVENOUS at 10:01

## 2022-01-01 RX ADMIN — HYDROMORPHONE HYDROCHLORIDE 0.5 MG: 1 INJECTION, SOLUTION INTRAMUSCULAR; INTRAVENOUS; SUBCUTANEOUS at 08:54

## 2022-01-01 RX ADMIN — RANOLAZINE 1000 MG: 500 TABLET, FILM COATED, EXTENDED RELEASE ORAL at 09:13

## 2022-01-01 RX ADMIN — BUDESONIDE 0.5 MG: 0.5 INHALANT RESPIRATORY (INHALATION) at 21:19

## 2022-01-01 RX ADMIN — OXYCODONE 10 MG: 5 TABLET ORAL at 23:17

## 2022-01-01 RX ADMIN — BUDESONIDE 0.5 MG: 0.5 INHALANT RESPIRATORY (INHALATION) at 20:22

## 2022-01-01 RX ADMIN — Medication 3 ML: at 21:19

## 2022-01-01 RX ADMIN — RANOLAZINE 1000 MG: 500 TABLET, FILM COATED, EXTENDED RELEASE ORAL at 20:50

## 2022-01-01 RX ADMIN — FAMOTIDINE 20 MG: 20 TABLET ORAL at 17:53

## 2022-01-01 RX ADMIN — RANOLAZINE 1000 MG: 500 TABLET, FILM COATED, EXTENDED RELEASE ORAL at 19:39

## 2022-01-01 RX ADMIN — ISOSORBIDE MONONITRATE 120 MG: 60 TABLET, EXTENDED RELEASE ORAL at 08:04

## 2022-01-01 RX ADMIN — GLIPIZIDE 5 MG: 5 TABLET ORAL at 08:08

## 2022-01-01 RX ADMIN — HYDROMORPHONE HYDROCHLORIDE 0.25 MG: 1 INJECTION, SOLUTION INTRAMUSCULAR; INTRAVENOUS; SUBCUTANEOUS at 07:06

## 2022-01-01 RX ADMIN — IPRATROPIUM BROMIDE AND ALBUTEROL SULFATE 3 ML: .5; 3 SOLUTION RESPIRATORY (INHALATION) at 15:21

## 2022-01-01 RX ADMIN — SUCRALFATE 1 G: 1 TABLET ORAL at 17:14

## 2022-01-01 RX ADMIN — SUCRALFATE 1 G: 1 TABLET ORAL at 11:37

## 2022-01-01 RX ADMIN — RANOLAZINE 1000 MG: 500 TABLET, FILM COATED, EXTENDED RELEASE ORAL at 07:43

## 2022-01-01 RX ADMIN — VANCOMYCIN HYDROCHLORIDE 750 MG: 750 INJECTION, POWDER, LYOPHILIZED, FOR SOLUTION INTRAVENOUS at 20:36

## 2022-01-01 RX ADMIN — SODIUM CHLORIDE 500 ML: 9 INJECTION, SOLUTION INTRAVENOUS at 11:31

## 2022-01-01 RX ADMIN — FAMOTIDINE 20 MG: 20 TABLET, FILM COATED ORAL at 08:50

## 2022-01-01 RX ADMIN — SUCRALFATE 1 G: 1 TABLET ORAL at 01:00

## 2022-01-01 RX ADMIN — HYDROMORPHONE HYDROCHLORIDE 0.25 MG: 1 INJECTION, SOLUTION INTRAMUSCULAR; INTRAVENOUS; SUBCUTANEOUS at 21:51

## 2022-01-01 RX ADMIN — SODIUM CHLORIDE, POTASSIUM CHLORIDE, SODIUM LACTATE AND CALCIUM CHLORIDE 125 ML/HR: 600; 310; 30; 20 INJECTION, SOLUTION INTRAVENOUS at 23:31

## 2022-01-01 RX ADMIN — FAMOTIDINE 20 MG: 20 TABLET ORAL at 21:43

## 2022-01-01 RX ADMIN — BARIUM SULFATE 50 ML: 400 SUSPENSION ORAL at 09:36

## 2022-01-01 RX ADMIN — ASPIRIN 81 MG: 81 TABLET, COATED ORAL at 09:08

## 2022-01-01 RX ADMIN — AMOXICILLIN AND CLAVULANATE POTASSIUM 1 TABLET: 875; 125 TABLET, FILM COATED ORAL at 15:07

## 2022-01-01 RX ADMIN — HYDROMORPHONE HYDROCHLORIDE 0.5 MG: 1 INJECTION, SOLUTION INTRAMUSCULAR; INTRAVENOUS; SUBCUTANEOUS at 13:37

## 2022-01-01 RX ADMIN — Medication 3 ML: at 10:14

## 2022-01-01 RX ADMIN — FUROSEMIDE 40 MG: 40 TABLET ORAL at 08:16

## 2022-01-01 RX ADMIN — INSULIN LISPRO 7 UNITS: 100 INJECTION, SOLUTION INTRAVENOUS; SUBCUTANEOUS at 11:28

## 2022-01-01 RX ADMIN — METHYLPREDNISOLONE SODIUM SUCCINATE 20 MG: 40 INJECTION, POWDER, FOR SOLUTION INTRAMUSCULAR; INTRAVENOUS at 20:24

## 2022-01-01 RX ADMIN — ONDANSETRON 4 MG: 2 INJECTION INTRAMUSCULAR; INTRAVENOUS at 00:40

## 2022-01-01 RX ADMIN — PROPOFOL 50 MG: 10 INJECTION, EMULSION INTRAVENOUS at 08:35

## 2022-01-01 RX ADMIN — Medication 100 MCG: at 14:40

## 2022-01-01 RX ADMIN — ISOSORBIDE MONONITRATE 120 MG: 30 TABLET, EXTENDED RELEASE ORAL at 08:49

## 2022-01-01 RX ADMIN — FAMOTIDINE 20 MG: 20 TABLET ORAL at 08:02

## 2022-01-01 RX ADMIN — ASPIRIN 81 MG: 81 TABLET, COATED ORAL at 08:43

## 2022-01-01 RX ADMIN — FAMOTIDINE 20 MG: 10 INJECTION INTRAVENOUS at 20:21

## 2022-01-01 RX ADMIN — INSULIN LISPRO 16 UNITS: 100 INJECTION, SOLUTION INTRAVENOUS; SUBCUTANEOUS at 07:15

## 2022-01-01 RX ADMIN — HYDROMORPHONE HYDROCHLORIDE 0.25 MG: 1 INJECTION, SOLUTION INTRAMUSCULAR; INTRAVENOUS; SUBCUTANEOUS at 23:11

## 2022-01-01 RX ADMIN — METOPROLOL TARTRATE 25 MG: 25 TABLET, FILM COATED ORAL at 21:02

## 2022-01-01 RX ADMIN — METOCLOPRAMIDE 5 MG: 5 TABLET ORAL at 18:43

## 2022-01-01 RX ADMIN — VANCOMYCIN HYDROCHLORIDE 1750 MG: 10 INJECTION, POWDER, LYOPHILIZED, FOR SOLUTION INTRAVENOUS at 08:57

## 2022-01-01 RX ADMIN — FAMOTIDINE 20 MG: 20 TABLET ORAL at 08:05

## 2022-01-01 RX ADMIN — AMPICILLIN SODIUM AND SULBACTAM SODIUM 1.5 G: 1; .5 INJECTION, POWDER, FOR SOLUTION INTRAMUSCULAR; INTRAVENOUS at 23:07

## 2022-01-01 RX ADMIN — ISOSORBIDE MONONITRATE 120 MG: 60 TABLET, EXTENDED RELEASE ORAL at 08:45

## 2022-01-01 RX ADMIN — INSULIN LISPRO 5 UNITS: 100 INJECTION, SOLUTION INTRAVENOUS; SUBCUTANEOUS at 17:25

## 2022-01-01 RX ADMIN — IPRATROPIUM BROMIDE AND ALBUTEROL SULFATE 3 ML: .5; 3 SOLUTION RESPIRATORY (INHALATION) at 06:35

## 2022-01-01 RX ADMIN — BUDESONIDE 0.5 MG: 0.5 INHALANT RESPIRATORY (INHALATION) at 06:50

## 2022-01-01 RX ADMIN — HYDROMORPHONE HYDROCHLORIDE 0.25 MG: 1 INJECTION, SOLUTION INTRAMUSCULAR; INTRAVENOUS; SUBCUTANEOUS at 09:13

## 2022-01-01 RX ADMIN — GLIPIZIDE 5 MG: 5 TABLET ORAL at 08:18

## 2022-01-01 RX ADMIN — INSULIN GLARGINE 10 UNITS: 100 INJECTION, SOLUTION SUBCUTANEOUS at 20:39

## 2022-01-01 RX ADMIN — Medication 3 ML: at 07:45

## 2022-01-01 RX ADMIN — AMPICILLIN SODIUM AND SULBACTAM SODIUM 1.5 G: 1; .5 INJECTION, POWDER, FOR SOLUTION INTRAMUSCULAR; INTRAVENOUS at 05:03

## 2022-01-01 RX ADMIN — Medication 3 ML: at 20:40

## 2022-01-01 RX ADMIN — METOCLOPRAMIDE 5 MG: 5 INJECTION, SOLUTION INTRAMUSCULAR; INTRAVENOUS at 01:33

## 2022-01-01 RX ADMIN — FUROSEMIDE 40 MG: 40 TABLET ORAL at 09:28

## 2022-01-01 RX ADMIN — CEFEPIME HYDROCHLORIDE 2 G: 2 INJECTION, POWDER, FOR SOLUTION INTRAVENOUS at 14:10

## 2022-01-01 RX ADMIN — SUCRALFATE 1 G: 1 TABLET ORAL at 08:46

## 2022-01-01 RX ADMIN — Medication 100 MCG: at 13:26

## 2022-01-01 RX ADMIN — INSULIN LISPRO 3 UNITS: 100 INJECTION, SOLUTION INTRAVENOUS; SUBCUTANEOUS at 07:12

## 2022-01-01 RX ADMIN — PREGABALIN 150 MG: 75 CAPSULE ORAL at 01:33

## 2022-01-01 RX ADMIN — DOCUSATE SODIUM 100 MG: 100 CAPSULE, LIQUID FILLED ORAL at 20:27

## 2022-01-01 RX ADMIN — HYDROMORPHONE HYDROCHLORIDE 0.5 MG: 1 INJECTION, SOLUTION INTRAMUSCULAR; INTRAVENOUS; SUBCUTANEOUS at 10:55

## 2022-01-01 RX ADMIN — METOPROLOL TARTRATE 50 MG: 50 TABLET, FILM COATED ORAL at 08:53

## 2022-01-01 RX ADMIN — FAMOTIDINE 20 MG: 10 INJECTION INTRAVENOUS at 12:15

## 2022-01-01 RX ADMIN — HYDROMORPHONE HYDROCHLORIDE 0.5 MG: 1 INJECTION, SOLUTION INTRAMUSCULAR; INTRAVENOUS; SUBCUTANEOUS at 04:33

## 2022-01-01 RX ADMIN — ISOSORBIDE MONONITRATE 120 MG: 60 TABLET, EXTENDED RELEASE ORAL at 08:09

## 2022-01-01 RX ADMIN — BACITRACIN 1 APPLICATION: 500 OINTMENT TOPICAL at 20:36

## 2022-01-01 RX ADMIN — INSULIN LISPRO 7 UNITS: 100 INJECTION, SOLUTION INTRAVENOUS; SUBCUTANEOUS at 07:55

## 2022-01-01 RX ADMIN — GUAIFENESIN 600 MG: 600 TABLET, EXTENDED RELEASE ORAL at 10:14

## 2022-01-01 RX ADMIN — TRAZODONE HYDROCHLORIDE 100 MG: 100 TABLET ORAL at 21:06

## 2022-01-01 RX ADMIN — FAMOTIDINE 20 MG: 20 TABLET ORAL at 18:16

## 2022-01-01 RX ADMIN — HYDROMORPHONE HYDROCHLORIDE 0.25 MG: 1 INJECTION, SOLUTION INTRAMUSCULAR; INTRAVENOUS; SUBCUTANEOUS at 19:55

## 2022-01-01 RX ADMIN — MIRTAZAPINE 30 MG: 15 TABLET, FILM COATED ORAL at 20:00

## 2022-01-01 RX ADMIN — INSULIN HUMAN 8 UNITS: 100 INJECTION, SOLUTION PARENTERAL at 11:44

## 2022-01-01 RX ADMIN — RANOLAZINE 1000 MG: 500 TABLET, FILM COATED, EXTENDED RELEASE ORAL at 19:56

## 2022-01-01 RX ADMIN — HYDROMORPHONE HYDROCHLORIDE 0.5 MG: 1 INJECTION, SOLUTION INTRAMUSCULAR; INTRAVENOUS; SUBCUTANEOUS at 03:03

## 2022-01-01 RX ADMIN — INSULIN LISPRO 3 UNITS: 100 INJECTION, SOLUTION INTRAVENOUS; SUBCUTANEOUS at 17:07

## 2022-01-01 RX ADMIN — OXYCODONE 10 MG: 5 TABLET ORAL at 00:04

## 2022-01-01 RX ADMIN — INSULIN LISPRO 12 UNITS: 100 INJECTION, SOLUTION INTRAVENOUS; SUBCUTANEOUS at 18:04

## 2022-01-01 RX ADMIN — INSULIN LISPRO 16 UNITS: 100 INJECTION, SOLUTION INTRAVENOUS; SUBCUTANEOUS at 12:22

## 2022-01-01 RX ADMIN — OXYCODONE 10 MG: 5 TABLET ORAL at 05:13

## 2022-01-01 RX ADMIN — OXYCODONE 10 MG: 5 TABLET ORAL at 06:33

## 2022-01-01 RX ADMIN — GLIPIZIDE 5 MG: 5 TABLET ORAL at 08:17

## 2022-01-01 RX ADMIN — PREGABALIN 150 MG: 75 CAPSULE ORAL at 21:02

## 2022-01-01 RX ADMIN — HYDROMORPHONE HYDROCHLORIDE 0.5 MG: 1 INJECTION, SOLUTION INTRAMUSCULAR; INTRAVENOUS; SUBCUTANEOUS at 17:27

## 2022-01-01 RX ADMIN — SORBITOL SOLUTION (BULK) 50 ML: 70 SOLUTION at 11:11

## 2022-01-01 RX ADMIN — OXYCODONE 10 MG: 5 TABLET ORAL at 12:50

## 2022-01-01 RX ADMIN — FAMOTIDINE 20 MG: 10 INJECTION INTRAVENOUS at 19:40

## 2022-01-01 RX ADMIN — FUROSEMIDE 40 MG: 40 TABLET ORAL at 08:11

## 2022-01-01 RX ADMIN — HYDROMORPHONE HYDROCHLORIDE 0.5 MG: 1 INJECTION, SOLUTION INTRAMUSCULAR; INTRAVENOUS; SUBCUTANEOUS at 00:24

## 2022-01-01 RX ADMIN — INSULIN LISPRO 7 UNITS: 100 INJECTION, SOLUTION INTRAVENOUS; SUBCUTANEOUS at 06:15

## 2022-01-01 RX ADMIN — Medication 3 ML: at 08:04

## 2022-01-01 RX ADMIN — RANOLAZINE 1000 MG: 500 TABLET, FILM COATED, EXTENDED RELEASE ORAL at 21:11

## 2022-01-01 RX ADMIN — SODIUM CHLORIDE: 0.9 INJECTION, SOLUTION INTRAVENOUS at 14:15

## 2022-01-01 RX ADMIN — ASPIRIN 81 MG: 81 TABLET, COATED ORAL at 08:21

## 2022-01-01 RX ADMIN — ONDANSETRON 4 MG: 2 INJECTION INTRAMUSCULAR; INTRAVENOUS at 09:04

## 2022-01-01 RX ADMIN — HYDROMORPHONE HYDROCHLORIDE 1 MG: 1 INJECTION, SOLUTION INTRAMUSCULAR; INTRAVENOUS; SUBCUTANEOUS at 16:48

## 2022-01-01 RX ADMIN — Medication 3 ML: at 08:17

## 2022-01-01 RX ADMIN — OXYCODONE 10 MG: 5 TABLET ORAL at 08:18

## 2022-01-01 RX ADMIN — AMPICILLIN SODIUM AND SULBACTAM SODIUM 1.5 G: 1; .5 INJECTION, POWDER, FOR SOLUTION INTRAMUSCULAR; INTRAVENOUS at 23:29

## 2022-01-01 RX ADMIN — OXYCODONE 10 MG: 5 TABLET ORAL at 08:35

## 2022-01-01 RX ADMIN — PREGABALIN 150 MG: 75 CAPSULE ORAL at 08:03

## 2022-01-01 RX ADMIN — METOPROLOL TARTRATE 50 MG: 50 TABLET, FILM COATED ORAL at 17:24

## 2022-01-01 RX ADMIN — FUROSEMIDE 40 MG: 40 TABLET ORAL at 08:50

## 2022-01-01 RX ADMIN — HYDROMORPHONE HYDROCHLORIDE 0.25 MG: 1 INJECTION, SOLUTION INTRAMUSCULAR; INTRAVENOUS; SUBCUTANEOUS at 11:49

## 2022-01-01 RX ADMIN — HYDROMORPHONE HYDROCHLORIDE 0.5 MG: 1 INJECTION, SOLUTION INTRAMUSCULAR; INTRAVENOUS; SUBCUTANEOUS at 09:59

## 2022-01-01 RX ADMIN — HYDROMORPHONE HYDROCHLORIDE 0.25 MG: 1 INJECTION, SOLUTION INTRAMUSCULAR; INTRAVENOUS; SUBCUTANEOUS at 12:44

## 2022-01-01 RX ADMIN — RANOLAZINE 1000 MG: 500 TABLET, FILM COATED, EXTENDED RELEASE ORAL at 22:09

## 2022-01-01 RX ADMIN — ASPIRIN 81 MG: 81 TABLET, COATED ORAL at 08:50

## 2022-01-01 RX ADMIN — METOLAZONE 5 MG: 5 TABLET ORAL at 08:10

## 2022-01-01 RX ADMIN — HYDROMORPHONE HYDROCHLORIDE 0.25 MG: 1 INJECTION, SOLUTION INTRAMUSCULAR; INTRAVENOUS; SUBCUTANEOUS at 16:02

## 2022-01-01 RX ADMIN — HYDROMORPHONE HYDROCHLORIDE 0.5 MG: 1 INJECTION, SOLUTION INTRAMUSCULAR; INTRAVENOUS; SUBCUTANEOUS at 08:59

## 2022-01-01 RX ADMIN — APIXABAN 5 MG: 5 TABLET, FILM COATED ORAL at 08:10

## 2022-01-01 RX ADMIN — FAMOTIDINE 20 MG: 20 TABLET, FILM COATED ORAL at 17:08

## 2022-01-01 RX ADMIN — IOPAMIDOL 100 ML: 755 INJECTION, SOLUTION INTRAVENOUS at 04:56

## 2022-01-01 RX ADMIN — ISOSORBIDE MONONITRATE 120 MG: 60 TABLET, EXTENDED RELEASE ORAL at 08:49

## 2022-01-01 RX ADMIN — GLIPIZIDE 5 MG: 5 TABLET ORAL at 07:28

## 2022-01-01 RX ADMIN — ONDANSETRON 4 MG: 2 INJECTION INTRAMUSCULAR; INTRAVENOUS at 18:10

## 2022-01-01 RX ADMIN — METOPROLOL TARTRATE 25 MG: 25 TABLET, FILM COATED ORAL at 08:49

## 2022-01-01 RX ADMIN — AMPICILLIN SODIUM AND SULBACTAM SODIUM 1.5 G: 1; .5 INJECTION, POWDER, FOR SOLUTION INTRAMUSCULAR; INTRAVENOUS at 06:05

## 2022-01-01 RX ADMIN — CEFEPIME HYDROCHLORIDE 2 G: 2 INJECTION, POWDER, FOR SOLUTION INTRAVENOUS at 15:25

## 2022-01-01 RX ADMIN — INSULIN LISPRO 7 UNITS: 100 INJECTION, SOLUTION INTRAVENOUS; SUBCUTANEOUS at 17:15

## 2022-01-01 RX ADMIN — METOCLOPRAMIDE 5 MG: 5 TABLET ORAL at 23:43

## 2022-01-01 RX ADMIN — METOPROLOL TARTRATE 50 MG: 50 TABLET, FILM COATED ORAL at 18:00

## 2022-01-01 RX ADMIN — HYDROMORPHONE HYDROCHLORIDE 1 MG: 1 INJECTION, SOLUTION INTRAMUSCULAR; INTRAVENOUS; SUBCUTANEOUS at 00:53

## 2022-01-01 RX ADMIN — FUROSEMIDE 40 MG: 40 TABLET ORAL at 08:18

## 2022-01-01 RX ADMIN — Medication 10 ML: at 01:13

## 2022-01-01 RX ADMIN — DOCUSATE SODIUM 200 MG: 100 CAPSULE, LIQUID FILLED ORAL at 21:15

## 2022-01-01 RX ADMIN — INSULIN LISPRO 7 UNITS: 100 INJECTION, SOLUTION INTRAVENOUS; SUBCUTANEOUS at 21:06

## 2022-01-01 RX ADMIN — AMPICILLIN SODIUM AND SULBACTAM SODIUM 1.5 G: 1; .5 INJECTION, POWDER, FOR SOLUTION INTRAMUSCULAR; INTRAVENOUS at 05:04

## 2022-01-01 RX ADMIN — HYDROMORPHONE HYDROCHLORIDE 1 MG: 1 INJECTION, SOLUTION INTRAMUSCULAR; INTRAVENOUS; SUBCUTANEOUS at 02:16

## 2022-01-01 RX ADMIN — ATORVASTATIN CALCIUM 40 MG: 40 TABLET, FILM COATED ORAL at 20:36

## 2022-01-01 RX ADMIN — ISOSORBIDE MONONITRATE 120 MG: 60 TABLET, EXTENDED RELEASE ORAL at 08:24

## 2022-01-01 RX ADMIN — GLIPIZIDE 5 MG: 5 TABLET ORAL at 09:06

## 2022-01-01 RX ADMIN — PROPOFOL 40 MG: 10 INJECTION, EMULSION INTRAVENOUS at 08:38

## 2022-01-01 RX ADMIN — PREGABALIN 150 MG: 75 CAPSULE ORAL at 08:24

## 2022-01-01 RX ADMIN — METHYLPREDNISOLONE SODIUM SUCCINATE 60 MG: 125 INJECTION, POWDER, FOR SOLUTION INTRAMUSCULAR; INTRAVENOUS at 00:53

## 2022-01-01 RX ADMIN — POLYETHYLENE GLYCOL 3350 17 G: 17 POWDER, FOR SOLUTION ORAL at 10:58

## 2022-01-01 RX ADMIN — Medication 10 ML: at 08:13

## 2022-01-01 RX ADMIN — HYDROMORPHONE HYDROCHLORIDE 1 MG: 1 INJECTION, SOLUTION INTRAMUSCULAR; INTRAVENOUS; SUBCUTANEOUS at 08:10

## 2022-01-01 RX ADMIN — HYDROMORPHONE HYDROCHLORIDE 0.5 MG: 1 INJECTION, SOLUTION INTRAMUSCULAR; INTRAVENOUS; SUBCUTANEOUS at 07:46

## 2022-01-01 RX ADMIN — AMPICILLIN SODIUM AND SULBACTAM SODIUM 1.5 G: 1; .5 INJECTION, POWDER, FOR SOLUTION INTRAMUSCULAR; INTRAVENOUS at 10:14

## 2022-01-01 RX ADMIN — SODIUM CHLORIDE 75 ML/HR: 9 INJECTION, SOLUTION INTRAVENOUS at 06:39

## 2022-01-01 RX ADMIN — HYDROMORPHONE HYDROCHLORIDE 1 MG: 1 INJECTION, SOLUTION INTRAMUSCULAR; INTRAVENOUS; SUBCUTANEOUS at 04:56

## 2022-01-01 RX ADMIN — ONDANSETRON 4 MG: 2 INJECTION INTRAMUSCULAR; INTRAVENOUS at 15:01

## 2022-01-01 RX ADMIN — INSULIN LISPRO 6 UNITS: 100 INJECTION, SOLUTION INTRAVENOUS; SUBCUTANEOUS at 08:17

## 2022-01-01 RX ADMIN — HYDROMORPHONE HYDROCHLORIDE 1 MG: 1 INJECTION, SOLUTION INTRAMUSCULAR; INTRAVENOUS; SUBCUTANEOUS at 05:54

## 2022-01-01 RX ADMIN — RANOLAZINE 500 MG: 500 TABLET, FILM COATED, EXTENDED RELEASE ORAL at 20:50

## 2022-01-01 RX ADMIN — SORBITOL SOLUTION (BULK) 30 ML: 70 SOLUTION at 08:17

## 2022-01-01 RX ADMIN — HYDROMORPHONE HYDROCHLORIDE 0.5 MG: 1 INJECTION, SOLUTION INTRAMUSCULAR; INTRAVENOUS; SUBCUTANEOUS at 04:46

## 2022-01-01 RX ADMIN — INSULIN LISPRO 5 UNITS: 100 INJECTION, SOLUTION INTRAVENOUS; SUBCUTANEOUS at 17:43

## 2022-01-01 RX ADMIN — SODIUM CHLORIDE, POTASSIUM CHLORIDE, SODIUM LACTATE AND CALCIUM CHLORIDE 125 ML/HR: 600; 310; 30; 20 INJECTION, SOLUTION INTRAVENOUS at 21:00

## 2022-01-01 RX ADMIN — IPRATROPIUM BROMIDE AND ALBUTEROL SULFATE 3 ML: .5; 3 SOLUTION RESPIRATORY (INHALATION) at 02:18

## 2022-01-01 RX ADMIN — Medication 3 ML: at 10:09

## 2022-01-01 RX ADMIN — ROPINIROLE HYDROCHLORIDE 1 MG: 1 TABLET, FILM COATED ORAL at 22:20

## 2022-01-01 RX ADMIN — APIXABAN 5 MG: 5 TABLET, FILM COATED ORAL at 08:15

## 2022-01-01 RX ADMIN — TETROFOSMIN 1 DOSE: 1.38 INJECTION, POWDER, LYOPHILIZED, FOR SOLUTION INTRAVENOUS at 10:48

## 2022-01-01 RX ADMIN — FAMOTIDINE 20 MG: 20 TABLET, FILM COATED ORAL at 08:17

## 2022-01-01 RX ADMIN — BACITRACIN 1 APPLICATION: 500 OINTMENT TOPICAL at 08:25

## 2022-01-01 RX ADMIN — HYDROMORPHONE HYDROCHLORIDE 0.5 MG: 1 INJECTION, SOLUTION INTRAMUSCULAR; INTRAVENOUS; SUBCUTANEOUS at 05:51

## 2022-01-01 RX ADMIN — NYSTATIN 1 APPLICATION: 100000 POWDER TOPICAL at 20:53

## 2022-01-01 RX ADMIN — CEFEPIME HYDROCHLORIDE 2 G: 2 INJECTION, POWDER, FOR SOLUTION INTRAVENOUS at 05:24

## 2022-01-01 RX ADMIN — HYDROMORPHONE HYDROCHLORIDE 1 MG: 1 INJECTION, SOLUTION INTRAMUSCULAR; INTRAVENOUS; SUBCUTANEOUS at 08:12

## 2022-01-01 RX ADMIN — FAMOTIDINE 20 MG: 10 INJECTION INTRAVENOUS at 08:09

## 2022-01-01 RX ADMIN — FAMOTIDINE 20 MG: 20 TABLET ORAL at 17:22

## 2022-01-01 RX ADMIN — APIXABAN 5 MG: 5 TABLET, FILM COATED ORAL at 08:02

## 2022-01-01 RX ADMIN — APIXABAN 5 MG: 5 TABLET, FILM COATED ORAL at 08:11

## 2022-01-01 RX ADMIN — ROPINIROLE HYDROCHLORIDE 1 MG: 1 TABLET, FILM COATED ORAL at 19:57

## 2022-01-01 RX ADMIN — FAMOTIDINE 20 MG: 20 TABLET ORAL at 17:40

## 2022-01-01 RX ADMIN — FAMOTIDINE 20 MG: 20 TABLET ORAL at 18:36

## 2022-01-01 RX ADMIN — ATORVASTATIN CALCIUM 40 MG: 40 TABLET, FILM COATED ORAL at 20:01

## 2022-01-01 RX ADMIN — IPRATROPIUM BROMIDE AND ALBUTEROL SULFATE 3 ML: .5; 3 SOLUTION RESPIRATORY (INHALATION) at 15:36

## 2022-01-01 RX ADMIN — SODIUM CHLORIDE, POTASSIUM CHLORIDE, SODIUM LACTATE AND CALCIUM CHLORIDE 125 ML/HR: 600; 310; 30; 20 INJECTION, SOLUTION INTRAVENOUS at 12:08

## 2022-01-01 RX ADMIN — SODIUM CHLORIDE: 0.9 INJECTION, SOLUTION INTRAVENOUS at 08:25

## 2022-01-01 RX ADMIN — METOPROLOL TARTRATE 25 MG: 25 TABLET, FILM COATED ORAL at 20:48

## 2022-01-01 RX ADMIN — IPRATROPIUM BROMIDE AND ALBUTEROL SULFATE 3 ML: .5; 3 SOLUTION RESPIRATORY (INHALATION) at 11:14

## 2022-01-01 RX ADMIN — HYDROMORPHONE HYDROCHLORIDE 1 MG: 1 INJECTION, SOLUTION INTRAMUSCULAR; INTRAVENOUS; SUBCUTANEOUS at 17:35

## 2022-01-01 RX ADMIN — IPRATROPIUM BROMIDE AND ALBUTEROL SULFATE 3 ML: .5; 3 SOLUTION RESPIRATORY (INHALATION) at 13:53

## 2022-01-01 RX ADMIN — RANOLAZINE 500 MG: 500 TABLET, FILM COATED, EXTENDED RELEASE ORAL at 08:24

## 2022-01-01 RX ADMIN — INSULIN LISPRO 2 UNITS: 100 INJECTION, SOLUTION INTRAVENOUS; SUBCUTANEOUS at 11:34

## 2022-01-01 RX ADMIN — HYDROMORPHONE HYDROCHLORIDE 1 MG: 1 INJECTION, SOLUTION INTRAMUSCULAR; INTRAVENOUS; SUBCUTANEOUS at 16:18

## 2022-01-01 RX ADMIN — INSULIN LISPRO 3 UNITS: 100 INJECTION, SOLUTION INTRAVENOUS; SUBCUTANEOUS at 11:56

## 2022-01-01 RX ADMIN — HYDROMORPHONE HYDROCHLORIDE 1 MG: 1 INJECTION, SOLUTION INTRAMUSCULAR; INTRAVENOUS; SUBCUTANEOUS at 18:35

## 2022-01-01 RX ADMIN — IPRATROPIUM BROMIDE AND ALBUTEROL SULFATE 3 ML: .5; 3 SOLUTION RESPIRATORY (INHALATION) at 06:22

## 2022-01-01 RX ADMIN — FAMOTIDINE 20 MG: 20 TABLET, FILM COATED ORAL at 16:49

## 2022-01-01 RX ADMIN — HYDROMORPHONE HYDROCHLORIDE 1 MG: 1 INJECTION, SOLUTION INTRAMUSCULAR; INTRAVENOUS; SUBCUTANEOUS at 11:23

## 2022-01-01 RX ADMIN — METOPROLOL TARTRATE 25 MG: 25 TABLET, FILM COATED ORAL at 20:20

## 2022-01-01 RX ADMIN — ONDANSETRON 4 MG: 2 INJECTION INTRAMUSCULAR; INTRAVENOUS at 19:46

## 2022-01-01 RX ADMIN — CEFTRIAXONE SODIUM 2 G: 2 INJECTION, POWDER, FOR SOLUTION INTRAMUSCULAR; INTRAVENOUS at 16:25

## 2022-01-01 RX ADMIN — IPRATROPIUM BROMIDE AND ALBUTEROL SULFATE 3 ML: .5; 3 SOLUTION RESPIRATORY (INHALATION) at 07:47

## 2022-01-01 RX ADMIN — OXYCODONE 10 MG: 5 TABLET ORAL at 21:18

## 2022-01-01 RX ADMIN — INSULIN LISPRO 4 UNITS: 100 INJECTION, SOLUTION INTRAVENOUS; SUBCUTANEOUS at 08:01

## 2022-01-01 RX ADMIN — IPRATROPIUM BROMIDE AND ALBUTEROL SULFATE 3 ML: .5; 3 SOLUTION RESPIRATORY (INHALATION) at 15:03

## 2022-01-01 RX ADMIN — FUROSEMIDE 40 MG: 40 TABLET ORAL at 06:07

## 2022-01-01 RX ADMIN — METOPROLOL TARTRATE 25 MG: 25 TABLET, FILM COATED ORAL at 20:25

## 2022-01-01 RX ADMIN — FAMOTIDINE 20 MG: 20 TABLET ORAL at 08:11

## 2022-01-01 RX ADMIN — Medication 10 ML: at 20:08

## 2022-01-01 RX ADMIN — HYDROMORPHONE HYDROCHLORIDE 0.5 MG: 1 INJECTION, SOLUTION INTRAMUSCULAR; INTRAVENOUS; SUBCUTANEOUS at 06:42

## 2022-01-01 RX ADMIN — Medication 3 ML: at 09:00

## 2022-01-01 RX ADMIN — INSULIN LISPRO 7 UNITS: 100 INJECTION, SOLUTION INTRAVENOUS; SUBCUTANEOUS at 19:19

## 2022-01-01 RX ADMIN — ISOSORBIDE MONONITRATE 120 MG: 60 TABLET, EXTENDED RELEASE ORAL at 07:50

## 2022-01-01 RX ADMIN — MIRTAZAPINE 30 MG: 15 TABLET, FILM COATED ORAL at 20:33

## 2022-01-01 RX ADMIN — ATORVASTATIN CALCIUM 40 MG: 40 TABLET, FILM COATED ORAL at 05:29

## 2022-01-01 RX ADMIN — IPRATROPIUM BROMIDE AND ALBUTEROL SULFATE 3 ML: .5; 3 SOLUTION RESPIRATORY (INHALATION) at 18:51

## 2022-01-01 RX ADMIN — GLIPIZIDE 5 MG: 5 TABLET ORAL at 08:43

## 2022-01-01 RX ADMIN — HYDROMORPHONE HYDROCHLORIDE 1 MG: 1 INJECTION, SOLUTION INTRAMUSCULAR; INTRAVENOUS; SUBCUTANEOUS at 08:31

## 2022-01-01 RX ADMIN — ONDANSETRON 4 MG: 2 INJECTION INTRAMUSCULAR; INTRAVENOUS at 06:43

## 2022-01-01 RX ADMIN — INSULIN LISPRO 7 UNITS: 100 INJECTION, SOLUTION INTRAVENOUS; SUBCUTANEOUS at 07:33

## 2022-01-01 RX ADMIN — GUAIFENESIN 600 MG: 600 TABLET, EXTENDED RELEASE ORAL at 08:17

## 2022-01-01 RX ADMIN — HYDROMORPHONE HYDROCHLORIDE 1 MG: 1 INJECTION, SOLUTION INTRAMUSCULAR; INTRAVENOUS; SUBCUTANEOUS at 17:06

## 2022-01-01 RX ADMIN — HYDROMORPHONE HYDROCHLORIDE 0.5 MG: 1 INJECTION, SOLUTION INTRAMUSCULAR; INTRAVENOUS; SUBCUTANEOUS at 06:23

## 2022-01-01 RX ADMIN — INSULIN LISPRO 12 UNITS: 100 INJECTION, SOLUTION INTRAVENOUS; SUBCUTANEOUS at 18:00

## 2022-01-01 RX ADMIN — HYDROMORPHONE HYDROCHLORIDE 0.5 MG: 1 INJECTION, SOLUTION INTRAMUSCULAR; INTRAVENOUS; SUBCUTANEOUS at 23:48

## 2022-01-01 RX ADMIN — HYDROMORPHONE HYDROCHLORIDE 0.5 MG: 1 INJECTION, SOLUTION INTRAMUSCULAR; INTRAVENOUS; SUBCUTANEOUS at 22:00

## 2022-01-01 RX ADMIN — DOCUSATE SODIUM 100 MG: 100 CAPSULE, LIQUID FILLED ORAL at 20:08

## 2022-01-01 RX ADMIN — IPRATROPIUM BROMIDE AND ALBUTEROL SULFATE 3 ML: .5; 3 SOLUTION RESPIRATORY (INHALATION) at 07:48

## 2022-01-01 RX ADMIN — ROPINIROLE HYDROCHLORIDE 0.5 MG: 0.25 TABLET, FILM COATED ORAL at 20:07

## 2022-01-01 RX ADMIN — VANCOMYCIN HYDROCHLORIDE 1750 MG: 10 INJECTION, POWDER, LYOPHILIZED, FOR SOLUTION INTRAVENOUS at 17:24

## 2022-01-01 RX ADMIN — AMOXICILLIN AND CLAVULANATE POTASSIUM 1 TABLET: 875; 125 TABLET, FILM COATED ORAL at 09:43

## 2022-01-01 RX ADMIN — RANOLAZINE 1000 MG: 500 TABLET, FILM COATED, EXTENDED RELEASE ORAL at 20:40

## 2022-01-01 RX ADMIN — ATORVASTATIN CALCIUM 40 MG: 40 TABLET, FILM COATED ORAL at 07:12

## 2022-01-01 RX ADMIN — ISOSORBIDE MONONITRATE 120 MG: 60 TABLET, EXTENDED RELEASE ORAL at 08:35

## 2022-01-01 RX ADMIN — FAMOTIDINE 20 MG: 10 INJECTION INTRAVENOUS at 07:31

## 2022-01-01 RX ADMIN — Medication 3 ML: at 20:24

## 2022-01-01 RX ADMIN — Medication 3 ML: at 20:51

## 2022-01-01 RX ADMIN — SUCRALFATE 1 G: 1 TABLET ORAL at 17:28

## 2022-01-01 RX ADMIN — IPRATROPIUM BROMIDE AND ALBUTEROL SULFATE 3 ML: .5; 3 SOLUTION RESPIRATORY (INHALATION) at 06:50

## 2022-01-01 RX ADMIN — FUROSEMIDE 40 MG: 40 TABLET ORAL at 08:23

## 2022-01-01 RX ADMIN — HYDROMORPHONE HYDROCHLORIDE 0.5 MG: 1 INJECTION, SOLUTION INTRAMUSCULAR; INTRAVENOUS; SUBCUTANEOUS at 04:28

## 2022-01-01 RX ADMIN — APIXABAN 5 MG: 5 TABLET, FILM COATED ORAL at 20:50

## 2022-01-01 RX ADMIN — PREGABALIN 150 MG: 75 CAPSULE ORAL at 22:20

## 2022-01-01 RX ADMIN — HYDROMORPHONE HYDROCHLORIDE 0.25 MG: 1 INJECTION, SOLUTION INTRAMUSCULAR; INTRAVENOUS; SUBCUTANEOUS at 22:56

## 2022-01-01 RX ADMIN — INSULIN LISPRO 24 UNITS: 100 INJECTION, SOLUTION INTRAVENOUS; SUBCUTANEOUS at 16:24

## 2022-01-01 RX ADMIN — HYDROMORPHONE HYDROCHLORIDE 0.5 MG: 1 INJECTION, SOLUTION INTRAMUSCULAR; INTRAVENOUS; SUBCUTANEOUS at 10:11

## 2022-01-01 RX ADMIN — GLIPIZIDE 5 MG: 5 TABLET ORAL at 07:15

## 2022-01-01 RX ADMIN — MAGNESIUM SULFATE HEPTAHYDRATE 2 G: 2 INJECTION, SOLUTION INTRAVENOUS at 13:57

## 2022-01-01 RX ADMIN — INSULIN LISPRO 12 UNITS: 100 INJECTION, SOLUTION INTRAVENOUS; SUBCUTANEOUS at 12:01

## 2022-01-01 RX ADMIN — DOCUSATE SODIUM 100 MG: 100 CAPSULE, LIQUID FILLED ORAL at 09:05

## 2022-01-01 RX ADMIN — HYDROMORPHONE HYDROCHLORIDE 0.5 MG: 1 INJECTION, SOLUTION INTRAMUSCULAR; INTRAVENOUS; SUBCUTANEOUS at 21:19

## 2022-01-01 RX ADMIN — HYDROMORPHONE HYDROCHLORIDE 0.5 MG: 1 INJECTION, SOLUTION INTRAMUSCULAR; INTRAVENOUS; SUBCUTANEOUS at 06:04

## 2022-01-01 RX ADMIN — HYDROMORPHONE HYDROCHLORIDE 1 MG: 1 INJECTION, SOLUTION INTRAMUSCULAR; INTRAVENOUS; SUBCUTANEOUS at 07:12

## 2022-01-01 RX ADMIN — DOXYCYCLINE 100 MG: 100 TABLET ORAL at 20:51

## 2022-01-01 RX ADMIN — OXYCODONE 10 MG: 5 TABLET ORAL at 08:17

## 2022-01-01 RX ADMIN — INSULIN LISPRO 6 UNITS: 100 INJECTION, SOLUTION INTRAVENOUS; SUBCUTANEOUS at 11:33

## 2022-01-01 RX ADMIN — ALBUTEROL SULFATE 2.5 MG: 2.5 SOLUTION RESPIRATORY (INHALATION) at 23:47

## 2022-01-01 RX ADMIN — MIRTAZAPINE 30 MG: 15 TABLET, FILM COATED ORAL at 22:21

## 2022-01-01 RX ADMIN — MIRTAZAPINE 30 MG: 15 TABLET, FILM COATED ORAL at 20:37

## 2022-01-01 RX ADMIN — PIPERACILLIN AND TAZOBACTAM 4.5 G: 4; .5 INJECTION, POWDER, FOR SOLUTION INTRAVENOUS at 00:10

## 2022-01-01 RX ADMIN — ATORVASTATIN CALCIUM 40 MG: 40 TABLET, FILM COATED ORAL at 16:44

## 2022-01-01 RX ADMIN — DOCUSATE SODIUM 100 MG: 100 CAPSULE, LIQUID FILLED ORAL at 08:24

## 2022-01-01 RX ADMIN — HYDROMORPHONE HYDROCHLORIDE 0.5 MG: 1 INJECTION, SOLUTION INTRAMUSCULAR; INTRAVENOUS; SUBCUTANEOUS at 01:58

## 2022-01-01 RX ADMIN — Medication 3 ML: at 21:00

## 2022-01-01 RX ADMIN — INSULIN LISPRO 7 UNITS: 100 INJECTION, SOLUTION INTRAVENOUS; SUBCUTANEOUS at 17:01

## 2022-01-01 RX ADMIN — CEFTRIAXONE 2 G: 2 INJECTION, POWDER, FOR SOLUTION INTRAMUSCULAR; INTRAVENOUS at 16:19

## 2022-01-01 RX ADMIN — FAMOTIDINE 40 MG: 20 TABLET ORAL at 16:47

## 2022-01-01 RX ADMIN — FAMOTIDINE 20 MG: 20 TABLET ORAL at 07:57

## 2022-01-01 RX ADMIN — AMPICILLIN SODIUM AND SULBACTAM SODIUM 1.5 G: 1; .5 INJECTION, POWDER, FOR SOLUTION INTRAMUSCULAR; INTRAVENOUS at 00:42

## 2022-01-01 RX ADMIN — RANOLAZINE 500 MG: 500 TABLET, FILM COATED, EXTENDED RELEASE ORAL at 09:33

## 2022-01-01 RX ADMIN — FAMOTIDINE 20 MG: 20 TABLET, FILM COATED ORAL at 09:14

## 2022-01-01 RX ADMIN — AMOXICILLIN AND CLAVULANATE POTASSIUM 1 TABLET: 875; 125 TABLET, FILM COATED ORAL at 08:44

## 2022-01-01 RX ADMIN — HYDROMORPHONE HYDROCHLORIDE 0.25 MG: 1 INJECTION, SOLUTION INTRAMUSCULAR; INTRAVENOUS; SUBCUTANEOUS at 02:22

## 2022-01-01 RX ADMIN — APIXABAN 5 MG: 5 TABLET, FILM COATED ORAL at 21:53

## 2022-01-01 RX ADMIN — HYDROMORPHONE HYDROCHLORIDE 1 MG: 1 INJECTION, SOLUTION INTRAMUSCULAR; INTRAVENOUS; SUBCUTANEOUS at 12:16

## 2022-01-01 RX ADMIN — HYDROMORPHONE HYDROCHLORIDE 0.5 MG: 1 INJECTION, SOLUTION INTRAMUSCULAR; INTRAVENOUS; SUBCUTANEOUS at 00:28

## 2022-01-01 RX ADMIN — INSULIN LISPRO 8 UNITS: 100 INJECTION, SOLUTION INTRAVENOUS; SUBCUTANEOUS at 18:35

## 2022-01-01 RX ADMIN — SODIUM CHLORIDE, POTASSIUM CHLORIDE, SODIUM LACTATE AND CALCIUM CHLORIDE 125 ML/HR: 600; 310; 30; 20 INJECTION, SOLUTION INTRAVENOUS at 05:01

## 2022-01-01 RX ADMIN — CEFTRIAXONE SODIUM 2 G: 2 INJECTION, POWDER, FOR SOLUTION INTRAMUSCULAR; INTRAVENOUS at 10:29

## 2022-01-01 RX ADMIN — PREGABALIN 150 MG: 75 CAPSULE ORAL at 10:13

## 2022-01-01 RX ADMIN — SODIUM CHLORIDE 500 ML: 9 INJECTION, SOLUTION INTRAVENOUS at 09:50

## 2022-01-01 RX ADMIN — FAMOTIDINE 20 MG: 20 TABLET ORAL at 10:12

## 2022-01-01 RX ADMIN — OXYCODONE 5 MG: 5 TABLET ORAL at 13:53

## 2022-01-01 RX ADMIN — GLIPIZIDE 5 MG: 5 TABLET ORAL at 07:51

## 2022-01-01 RX ADMIN — POTASSIUM CHLORIDE 40 MEQ: 1500 TABLET, EXTENDED RELEASE ORAL at 18:03

## 2022-01-01 RX ADMIN — HYDROMORPHONE HYDROCHLORIDE 0.5 MG: 1 INJECTION, SOLUTION INTRAMUSCULAR; INTRAVENOUS; SUBCUTANEOUS at 12:38

## 2022-01-01 RX ADMIN — FUROSEMIDE 40 MG: 40 TABLET ORAL at 08:20

## 2022-01-01 RX ADMIN — HYDROMORPHONE HYDROCHLORIDE 0.25 MG: 1 INJECTION, SOLUTION INTRAMUSCULAR; INTRAVENOUS; SUBCUTANEOUS at 13:57

## 2022-01-01 RX ADMIN — PREGABALIN 150 MG: 75 CAPSULE ORAL at 08:35

## 2022-01-01 RX ADMIN — OXYCODONE 10 MG: 5 TABLET ORAL at 10:13

## 2022-01-01 RX ADMIN — HYDROMORPHONE HYDROCHLORIDE 0.5 MG: 1 INJECTION, SOLUTION INTRAMUSCULAR; INTRAVENOUS; SUBCUTANEOUS at 09:35

## 2022-01-01 RX ADMIN — HYDROMORPHONE HYDROCHLORIDE 1 MG: 1 INJECTION, SOLUTION INTRAMUSCULAR; INTRAVENOUS; SUBCUTANEOUS at 19:11

## 2022-01-01 RX ADMIN — SODIUM CHLORIDE, POTASSIUM CHLORIDE, SODIUM LACTATE AND CALCIUM CHLORIDE 150 ML/HR: 600; 310; 30; 20 INJECTION, SOLUTION INTRAVENOUS at 06:42

## 2022-01-01 RX ADMIN — VANCOMYCIN HYDROCHLORIDE 1750 MG: 10 INJECTION, POWDER, LYOPHILIZED, FOR SOLUTION INTRAVENOUS at 14:30

## 2022-01-01 RX ADMIN — GUAIFENESIN 600 MG: 600 TABLET, EXTENDED RELEASE ORAL at 21:02

## 2022-01-01 RX ADMIN — IPRATROPIUM BROMIDE AND ALBUTEROL SULFATE 3 ML: 2.5; .5 SOLUTION RESPIRATORY (INHALATION) at 06:50

## 2022-01-01 RX ADMIN — SUCRALFATE 1 G: 1 TABLET ORAL at 07:41

## 2022-01-01 RX ADMIN — HYDROXYZINE HYDROCHLORIDE 25 MG: 25 TABLET ORAL at 00:38

## 2022-01-01 RX ADMIN — FAMOTIDINE 20 MG: 20 TABLET ORAL at 09:06

## 2022-01-01 RX ADMIN — RANOLAZINE 500 MG: 500 TABLET, FILM COATED, EXTENDED RELEASE ORAL at 09:06

## 2022-01-01 RX ADMIN — ISOSORBIDE MONONITRATE 120 MG: 60 TABLET, EXTENDED RELEASE ORAL at 08:20

## 2022-01-01 RX ADMIN — IPRATROPIUM BROMIDE AND ALBUTEROL SULFATE 3 ML: 2.5; .5 SOLUTION RESPIRATORY (INHALATION) at 16:05

## 2022-01-01 RX ADMIN — GUAIFENESIN 600 MG: 600 TABLET, EXTENDED RELEASE ORAL at 13:38

## 2022-01-01 RX ADMIN — METOPROLOL TARTRATE 50 MG: 50 TABLET, FILM COATED ORAL at 17:45

## 2022-01-01 RX ADMIN — INSULIN LISPRO 2 UNITS: 100 INJECTION, SOLUTION INTRAVENOUS; SUBCUTANEOUS at 12:02

## 2022-01-01 RX ADMIN — SORBITOL SOLUTION (BULK) 30 ML: 70 SOLUTION at 08:45

## 2022-01-01 RX ADMIN — METOPROLOL TARTRATE 25 MG: 25 TABLET, FILM COATED ORAL at 20:07

## 2022-01-01 RX ADMIN — IPRATROPIUM BROMIDE AND ALBUTEROL SULFATE 3 ML: .5; 3 SOLUTION RESPIRATORY (INHALATION) at 17:15

## 2022-01-01 RX ADMIN — OXYCODONE 10 MG: 5 TABLET ORAL at 22:20

## 2022-01-01 RX ADMIN — INSULIN LISPRO 16 UNITS: 100 INJECTION, SOLUTION INTRAVENOUS; SUBCUTANEOUS at 11:33

## 2022-01-01 RX ADMIN — FAMOTIDINE 20 MG: 10 INJECTION INTRAVENOUS at 20:32

## 2022-01-01 RX ADMIN — IPRATROPIUM BROMIDE AND ALBUTEROL SULFATE 3 ML: .5; 3 SOLUTION RESPIRATORY (INHALATION) at 02:49

## 2022-01-01 RX ADMIN — ROPINIROLE HYDROCHLORIDE 1 MG: 1 TABLET, FILM COATED ORAL at 07:50

## 2022-01-01 RX ADMIN — PREGABALIN 150 MG: 75 CAPSULE ORAL at 08:11

## 2022-01-01 RX ADMIN — ENOXAPARIN SODIUM 120 MG: 150 INJECTION SUBCUTANEOUS at 00:01

## 2022-01-01 RX ADMIN — METOCLOPRAMIDE 5 MG: 5 INJECTION, SOLUTION INTRAMUSCULAR; INTRAVENOUS at 15:01

## 2022-01-01 RX ADMIN — VANCOMYCIN HYDROCHLORIDE 1500 MG: 10 INJECTION, POWDER, LYOPHILIZED, FOR SOLUTION INTRAVENOUS at 18:20

## 2022-01-01 RX ADMIN — SODIUM CHLORIDE 75 ML/HR: 9 INJECTION, SOLUTION INTRAVENOUS at 02:44

## 2022-01-01 RX ADMIN — POLYETHYLENE GLYCOL 3350 17 G: 17 POWDER, FOR SOLUTION ORAL at 08:45

## 2022-01-01 RX ADMIN — APIXABAN 5 MG: 5 TABLET, FILM COATED ORAL at 07:54

## 2022-01-01 RX ADMIN — Medication 3 ML: at 20:44

## 2022-01-01 RX ADMIN — HYDROMORPHONE HYDROCHLORIDE 0.5 MG: 1 INJECTION, SOLUTION INTRAMUSCULAR; INTRAVENOUS; SUBCUTANEOUS at 21:35

## 2022-01-01 RX ADMIN — METOPROLOL TARTRATE 25 MG: 25 TABLET, FILM COATED ORAL at 09:33

## 2022-01-01 RX ADMIN — HYDROMORPHONE HYDROCHLORIDE 0.25 MG: 1 INJECTION, SOLUTION INTRAMUSCULAR; INTRAVENOUS; SUBCUTANEOUS at 11:36

## 2022-01-01 RX ADMIN — MIRTAZAPINE 30 MG: 15 TABLET, FILM COATED ORAL at 20:08

## 2022-01-01 RX ADMIN — SUCRALFATE 1 G: 1 TABLET ORAL at 07:54

## 2022-01-01 RX ADMIN — APIXABAN 5 MG: 5 TABLET, FILM COATED ORAL at 21:02

## 2022-01-01 RX ADMIN — NYSTATIN 1 APPLICATION: 100000 POWDER TOPICAL at 20:01

## 2022-01-01 RX ADMIN — HYDROMORPHONE HYDROCHLORIDE 0.5 MG: 1 INJECTION, SOLUTION INTRAMUSCULAR; INTRAVENOUS; SUBCUTANEOUS at 20:16

## 2022-01-01 RX ADMIN — HYDROMORPHONE HYDROCHLORIDE 1 MG: 1 INJECTION, SOLUTION INTRAMUSCULAR; INTRAVENOUS; SUBCUTANEOUS at 15:17

## 2022-01-01 RX ADMIN — HYDROMORPHONE HYDROCHLORIDE 1 MG: 1 INJECTION, SOLUTION INTRAMUSCULAR; INTRAVENOUS; SUBCUTANEOUS at 12:52

## 2022-01-01 RX ADMIN — ATORVASTATIN CALCIUM 40 MG: 40 TABLET, FILM COATED ORAL at 09:14

## 2022-01-01 RX ADMIN — PREGABALIN 150 MG: 75 CAPSULE ORAL at 21:57

## 2022-01-01 RX ADMIN — DOCUSATE SODIUM 200 MG: 100 CAPSULE, LIQUID FILLED ORAL at 01:00

## 2022-01-01 RX ADMIN — TRAZODONE HYDROCHLORIDE 100 MG: 100 TABLET ORAL at 20:37

## 2022-01-01 RX ADMIN — HYDROMORPHONE HYDROCHLORIDE 0.25 MG: 1 INJECTION, SOLUTION INTRAMUSCULAR; INTRAVENOUS; SUBCUTANEOUS at 03:35

## 2022-01-01 RX ADMIN — INSULIN HUMAN 8 UNITS: 100 INJECTION, SOLUTION PARENTERAL at 17:27

## 2022-01-01 RX ADMIN — PREGABALIN 150 MG: 75 CAPSULE ORAL at 20:53

## 2022-01-01 RX ADMIN — METOCLOPRAMIDE 5 MG: 5 TABLET ORAL at 17:19

## 2022-01-01 RX ADMIN — OXYCODONE 10 MG: 5 TABLET ORAL at 14:46

## 2022-01-01 RX ADMIN — APIXABAN 5 MG: 5 TABLET, FILM COATED ORAL at 08:17

## 2022-01-01 RX ADMIN — TRAZODONE HYDROCHLORIDE 100 MG: 100 TABLET ORAL at 21:25

## 2022-01-01 RX ADMIN — FUROSEMIDE 40 MG: 40 TABLET ORAL at 08:53

## 2022-01-01 RX ADMIN — BUDESONIDE 0.5 MG: 0.5 INHALANT RESPIRATORY (INHALATION) at 20:18

## 2022-01-01 RX ADMIN — INSULIN LISPRO 5 UNITS: 100 INJECTION, SOLUTION INTRAVENOUS; SUBCUTANEOUS at 12:30

## 2022-01-01 RX ADMIN — BUDESONIDE AND FORMOTEROL FUMARATE DIHYDRATE 2 PUFF: 80; 4.5 AEROSOL RESPIRATORY (INHALATION) at 19:17

## 2022-01-01 RX ADMIN — ASPIRIN 81 MG: 81 TABLET, COATED ORAL at 09:07

## 2022-01-01 RX ADMIN — ONDANSETRON 4 MG: 2 INJECTION INTRAMUSCULAR; INTRAVENOUS at 06:21

## 2022-01-01 RX ADMIN — ATORVASTATIN CALCIUM 40 MG: 40 TABLET, FILM COATED ORAL at 09:28

## 2022-01-01 RX ADMIN — Medication 3 ML: at 09:14

## 2022-01-01 RX ADMIN — FAMOTIDINE 20 MG: 20 TABLET, FILM COATED ORAL at 08:35

## 2022-01-01 RX ADMIN — INSULIN GLARGINE 10 UNITS: 100 INJECTION, SOLUTION SUBCUTANEOUS at 21:31

## 2022-01-01 RX ADMIN — METOPROLOL TARTRATE 25 MG: 25 TABLET, FILM COATED ORAL at 08:24

## 2022-01-01 RX ADMIN — ASPIRIN 81 MG: 81 TABLET, COATED ORAL at 08:10

## 2022-01-01 RX ADMIN — INSULIN LISPRO 7 UNITS: 100 INJECTION, SOLUTION INTRAVENOUS; SUBCUTANEOUS at 17:28

## 2022-01-01 RX ADMIN — VANCOMYCIN HYDROCHLORIDE 1500 MG: 10 INJECTION, POWDER, LYOPHILIZED, FOR SOLUTION INTRAVENOUS at 11:41

## 2022-01-01 RX ADMIN — INSULIN LISPRO 4 UNITS: 100 INJECTION, SOLUTION INTRAVENOUS; SUBCUTANEOUS at 08:16

## 2022-01-01 RX ADMIN — PIPERACILLIN AND TAZOBACTAM 4.5 G: 4; .5 INJECTION, POWDER, FOR SOLUTION INTRAVENOUS at 15:37

## 2022-01-01 RX ADMIN — ALUMINA, MAGNESIA, AND SIMETHICONE: 2400; 2400; 240 SUSPENSION ORAL at 15:44

## 2022-01-01 RX ADMIN — BACITRACIN 1 APPLICATION: 500 OINTMENT TOPICAL at 08:17

## 2022-01-01 RX ADMIN — PREGABALIN 150 MG: 75 CAPSULE ORAL at 08:49

## 2022-01-01 RX ADMIN — HYDROMORPHONE HYDROCHLORIDE 1 MG: 1 INJECTION, SOLUTION INTRAMUSCULAR; INTRAVENOUS; SUBCUTANEOUS at 11:44

## 2022-01-01 RX ADMIN — HYDROMORPHONE HYDROCHLORIDE 0.5 MG: 1 INJECTION, SOLUTION INTRAMUSCULAR; INTRAVENOUS; SUBCUTANEOUS at 13:43

## 2022-01-01 RX ADMIN — TRAZODONE HYDROCHLORIDE 100 MG: 100 TABLET ORAL at 22:20

## 2022-01-01 RX ADMIN — MIRTAZAPINE 30 MG: 15 TABLET, FILM COATED ORAL at 20:09

## 2022-01-01 RX ADMIN — INSULIN LISPRO 5 UNITS: 100 INJECTION, SOLUTION INTRAVENOUS; SUBCUTANEOUS at 08:44

## 2022-01-01 RX ADMIN — SORBITOL SOLUTION (BULK) 30 ML: 70 SOLUTION at 16:59

## 2022-01-01 RX ADMIN — DOCUSATE SODIUM 100 MG: 100 CAPSULE, LIQUID FILLED ORAL at 08:49

## 2022-01-01 RX ADMIN — INSULIN LISPRO 3 UNITS: 100 INJECTION, SOLUTION INTRAVENOUS; SUBCUTANEOUS at 17:32

## 2022-01-01 RX ADMIN — APIXABAN 5 MG: 5 TABLET, FILM COATED ORAL at 20:36

## 2022-01-01 RX ADMIN — APIXABAN 5 MG: 5 TABLET, FILM COATED ORAL at 08:44

## 2022-01-01 RX ADMIN — FUROSEMIDE 20 MG: 10 INJECTION, SOLUTION INTRAMUSCULAR; INTRAVENOUS at 21:43

## 2022-01-01 RX ADMIN — Medication 10 ML: at 01:16

## 2022-01-01 RX ADMIN — OXYCODONE 10 MG: 5 TABLET ORAL at 20:52

## 2022-01-01 RX ADMIN — PREGABALIN 150 MG: 75 CAPSULE ORAL at 20:01

## 2022-01-01 RX ADMIN — INSULIN LISPRO 5 UNITS: 100 INJECTION, SOLUTION INTRAVENOUS; SUBCUTANEOUS at 16:54

## 2022-01-01 RX ADMIN — ISOSORBIDE MONONITRATE 120 MG: 60 TABLET, EXTENDED RELEASE ORAL at 10:56

## 2022-01-01 RX ADMIN — VANCOMYCIN HYDROCHLORIDE 1750 MG: 10 INJECTION, POWDER, LYOPHILIZED, FOR SOLUTION INTRAVENOUS at 14:51

## 2022-01-01 RX ADMIN — HYDROMORPHONE HYDROCHLORIDE 0.5 MG: 1 INJECTION, SOLUTION INTRAMUSCULAR; INTRAVENOUS; SUBCUTANEOUS at 19:31

## 2022-01-01 RX ADMIN — Medication 3 ML: at 21:51

## 2022-01-01 RX ADMIN — INSULIN LISPRO 5 UNITS: 100 INJECTION, SOLUTION INTRAVENOUS; SUBCUTANEOUS at 08:11

## 2022-01-01 RX ADMIN — APIXABAN 5 MG: 5 TABLET, FILM COATED ORAL at 10:14

## 2022-01-01 RX ADMIN — Medication 3 ML: at 20:10

## 2022-01-01 RX ADMIN — Medication 3 ML: at 20:21

## 2022-01-01 RX ADMIN — TRAZODONE HYDROCHLORIDE 100 MG: 100 TABLET ORAL at 21:57

## 2022-01-01 RX ADMIN — PREGABALIN 150 MG: 75 CAPSULE ORAL at 09:07

## 2022-01-01 RX ADMIN — KETOROLAC TROMETHAMINE 15 MG: 15 INJECTION, SOLUTION INTRAMUSCULAR; INTRAVENOUS at 13:26

## 2022-01-01 RX ADMIN — IPRATROPIUM BROMIDE AND ALBUTEROL SULFATE 3 ML: .5; 3 SOLUTION RESPIRATORY (INHALATION) at 13:09

## 2022-01-01 RX ADMIN — INSULIN LISPRO 12 UNITS: 100 INJECTION, SOLUTION INTRAVENOUS; SUBCUTANEOUS at 09:37

## 2022-01-01 RX ADMIN — HYDROMORPHONE HYDROCHLORIDE 1 MG: 1 INJECTION, SOLUTION INTRAMUSCULAR; INTRAVENOUS; SUBCUTANEOUS at 23:24

## 2022-01-01 RX ADMIN — APIXABAN 5 MG: 5 TABLET, FILM COATED ORAL at 20:40

## 2022-01-01 RX ADMIN — RANOLAZINE 500 MG: 500 TABLET, FILM COATED, EXTENDED RELEASE ORAL at 21:06

## 2022-01-01 RX ADMIN — HYDROMORPHONE HYDROCHLORIDE 0.25 MG: 1 INJECTION, SOLUTION INTRAMUSCULAR; INTRAVENOUS; SUBCUTANEOUS at 17:20

## 2022-01-01 RX ADMIN — BACITRACIN 1 APPLICATION: 500 OINTMENT TOPICAL at 08:15

## 2022-01-01 RX ADMIN — Medication 3 ML: at 20:33

## 2022-01-01 RX ADMIN — IPRATROPIUM BROMIDE AND ALBUTEROL SULFATE 3 ML: .5; 3 SOLUTION RESPIRATORY (INHALATION) at 18:32

## 2022-01-01 RX ADMIN — HYDROMORPHONE HYDROCHLORIDE 1 MG: 1 INJECTION, SOLUTION INTRAMUSCULAR; INTRAVENOUS; SUBCUTANEOUS at 15:25

## 2022-01-01 RX ADMIN — IPRATROPIUM BROMIDE AND ALBUTEROL SULFATE 3 ML: .5; 3 SOLUTION RESPIRATORY (INHALATION) at 12:30

## 2022-01-01 RX ADMIN — INSULIN LISPRO 5 UNITS: 100 INJECTION, SOLUTION INTRAVENOUS; SUBCUTANEOUS at 17:53

## 2022-01-01 RX ADMIN — HYDROMORPHONE HYDROCHLORIDE 1 MG: 1 INJECTION, SOLUTION INTRAMUSCULAR; INTRAVENOUS; SUBCUTANEOUS at 02:31

## 2022-01-01 RX ADMIN — SODIUM CHLORIDE 250 MG: 9 INJECTION, SOLUTION INTRAVENOUS at 22:11

## 2022-01-01 RX ADMIN — DOCUSATE SODIUM 200 MG: 100 CAPSULE, LIQUID FILLED ORAL at 09:33

## 2022-01-01 RX ADMIN — ROPINIROLE HYDROCHLORIDE 1 MG: 1 TABLET, FILM COATED ORAL at 09:28

## 2022-01-01 RX ADMIN — HYDROMORPHONE HYDROCHLORIDE 0.25 MG: 1 INJECTION, SOLUTION INTRAMUSCULAR; INTRAVENOUS; SUBCUTANEOUS at 17:08

## 2022-01-01 RX ADMIN — NASAL DECONGESTANT 2 SPRAY: 0.05 SPRAY NASAL at 20:33

## 2022-01-01 RX ADMIN — METOPROLOL TARTRATE 25 MG: 25 TABLET, FILM COATED ORAL at 08:11

## 2022-01-01 RX ADMIN — HYDROMORPHONE HYDROCHLORIDE 0.5 MG: 1 INJECTION, SOLUTION INTRAMUSCULAR; INTRAVENOUS; SUBCUTANEOUS at 20:33

## 2022-01-01 RX ADMIN — DOCUSATE SODIUM 200 MG: 100 CAPSULE, LIQUID FILLED ORAL at 08:18

## 2022-01-01 RX ADMIN — FAMOTIDINE 20 MG: 10 INJECTION INTRAVENOUS at 08:35

## 2022-01-01 RX ADMIN — FAMOTIDINE 20 MG: 20 TABLET, FILM COATED ORAL at 07:07

## 2022-01-01 RX ADMIN — METOPROLOL TARTRATE 25 MG: 25 TABLET, FILM COATED ORAL at 21:21

## 2022-01-01 RX ADMIN — APIXABAN 5 MG: 5 TABLET, FILM COATED ORAL at 08:49

## 2022-01-01 RX ADMIN — DOCUSATE SODIUM 100 MG: 100 CAPSULE, LIQUID FILLED ORAL at 21:25

## 2022-01-01 RX ADMIN — BUDESONIDE 0.5 MG: 0.5 INHALANT RESPIRATORY (INHALATION) at 06:24

## 2022-01-01 RX ADMIN — METOPROLOL TARTRATE 50 MG: 50 TABLET, FILM COATED ORAL at 16:55

## 2022-01-01 RX ADMIN — BACITRACIN 1 APPLICATION: 500 OINTMENT TOPICAL at 09:37

## 2022-01-01 RX ADMIN — HYDROMORPHONE HYDROCHLORIDE 0.5 MG: 1 INJECTION, SOLUTION INTRAMUSCULAR; INTRAVENOUS; SUBCUTANEOUS at 17:12

## 2022-01-01 RX ADMIN — IPRATROPIUM BROMIDE AND ALBUTEROL SULFATE 3 ML: 2.5; .5 SOLUTION RESPIRATORY (INHALATION) at 04:21

## 2022-01-01 RX ADMIN — INSULIN LISPRO 4 UNITS: 100 INJECTION, SOLUTION INTRAVENOUS; SUBCUTANEOUS at 17:23

## 2022-01-01 RX ADMIN — ISOSORBIDE MONONITRATE 120 MG: 60 TABLET, EXTENDED RELEASE ORAL at 09:31

## 2022-01-01 RX ADMIN — BACITRACIN 1 APPLICATION: 500 OINTMENT TOPICAL at 21:07

## 2022-01-01 RX ADMIN — AMPICILLIN SODIUM AND SULBACTAM SODIUM 1.5 G: 1; .5 INJECTION, POWDER, FOR SOLUTION INTRAMUSCULAR; INTRAVENOUS at 17:24

## 2022-01-01 RX ADMIN — PREGABALIN 150 MG: 75 CAPSULE ORAL at 09:06

## 2022-01-01 RX ADMIN — HYDROMORPHONE HYDROCHLORIDE 0.25 MG: 1 INJECTION, SOLUTION INTRAMUSCULAR; INTRAVENOUS; SUBCUTANEOUS at 20:09

## 2022-01-01 RX ADMIN — BENZONATATE 100 MG: 100 CAPSULE ORAL at 08:24

## 2022-01-01 RX ADMIN — HYDROMORPHONE HYDROCHLORIDE 0.25 MG: 1 INJECTION, SOLUTION INTRAMUSCULAR; INTRAVENOUS; SUBCUTANEOUS at 10:40

## 2022-01-01 RX ADMIN — HYDROMORPHONE HYDROCHLORIDE 0.5 MG: 1 INJECTION, SOLUTION INTRAMUSCULAR; INTRAVENOUS; SUBCUTANEOUS at 00:32

## 2022-01-01 RX ADMIN — SODIUM CHLORIDE 500 ML: 9 INJECTION, SOLUTION INTRAVENOUS at 13:30

## 2022-01-01 RX ADMIN — ARFORMOTEROL TARTRATE 15 MCG: 15 SOLUTION RESPIRATORY (INHALATION) at 06:30

## 2022-01-01 RX ADMIN — TRAZODONE HYDROCHLORIDE 100 MG: 100 TABLET ORAL at 20:31

## 2022-01-01 RX ADMIN — PREGABALIN 150 MG: 75 CAPSULE ORAL at 07:44

## 2022-01-01 RX ADMIN — PIPERACILLIN AND TAZOBACTAM 4.5 G: 4; .5 INJECTION, POWDER, FOR SOLUTION INTRAVENOUS at 16:34

## 2022-01-01 RX ADMIN — SODIUM CHLORIDE, SODIUM LACTATE, POTASSIUM CHLORIDE, AND CALCIUM CHLORIDE 20 ML/HR: .6; .31; .03; .02 INJECTION, SOLUTION INTRAVENOUS at 07:03

## 2022-01-01 RX ADMIN — Medication 3 ML: at 20:29

## 2022-01-01 RX ADMIN — VANCOMYCIN HYDROCHLORIDE 1750 MG: 10 INJECTION, POWDER, LYOPHILIZED, FOR SOLUTION INTRAVENOUS at 18:02

## 2022-01-01 RX ADMIN — PREGABALIN 150 MG: 75 CAPSULE ORAL at 21:22

## 2022-01-01 RX ADMIN — CEFTRIAXONE 1 G: 1 INJECTION, POWDER, FOR SOLUTION INTRAMUSCULAR; INTRAVENOUS at 22:28

## 2022-01-01 RX ADMIN — CEPHALEXIN 500 MG: 500 CAPSULE ORAL at 05:37

## 2022-01-01 RX ADMIN — IPRATROPIUM BROMIDE AND ALBUTEROL SULFATE 3 ML: .5; 3 SOLUTION RESPIRATORY (INHALATION) at 13:32

## 2022-01-01 RX ADMIN — INSULIN LISPRO 12 UNITS: 100 INJECTION, SOLUTION INTRAVENOUS; SUBCUTANEOUS at 08:48

## 2022-01-01 RX ADMIN — APIXABAN 5 MG: 5 TABLET, FILM COATED ORAL at 20:31

## 2022-01-01 RX ADMIN — INSULIN LISPRO 5 UNITS: 100 INJECTION, SOLUTION INTRAVENOUS; SUBCUTANEOUS at 11:58

## 2022-01-01 RX ADMIN — FAMOTIDINE 20 MG: 20 TABLET ORAL at 16:31

## 2022-01-01 RX ADMIN — OXYCODONE 10 MG: 5 TABLET ORAL at 15:15

## 2022-01-01 RX ADMIN — Medication 3 ML: at 08:19

## 2022-01-01 RX ADMIN — HYDROMORPHONE HYDROCHLORIDE 0.5 MG: 1 INJECTION, SOLUTION INTRAMUSCULAR; INTRAVENOUS; SUBCUTANEOUS at 15:32

## 2022-01-01 RX ADMIN — DOCUSATE SODIUM 200 MG: 100 CAPSULE, LIQUID FILLED ORAL at 20:50

## 2022-01-01 RX ADMIN — FAMOTIDINE 20 MG: 20 TABLET ORAL at 08:43

## 2022-01-01 RX ADMIN — INSULIN LISPRO 5 UNITS: 100 INJECTION, SOLUTION INTRAVENOUS; SUBCUTANEOUS at 07:13

## 2022-01-01 RX ADMIN — ROPINIROLE HYDROCHLORIDE 1 MG: 1 TABLET, FILM COATED ORAL at 20:40

## 2022-01-01 RX ADMIN — DOCUSATE SODIUM 100 MG: 100 CAPSULE, LIQUID FILLED ORAL at 08:11

## 2022-01-01 RX ADMIN — HYDROMORPHONE HYDROCHLORIDE 0.5 MG: 1 INJECTION, SOLUTION INTRAMUSCULAR; INTRAVENOUS; SUBCUTANEOUS at 16:34

## 2022-01-01 RX ADMIN — TRAZODONE HYDROCHLORIDE 100 MG: 100 TABLET ORAL at 21:23

## 2022-01-01 RX ADMIN — DOCUSATE SODIUM 200 MG: 100 CAPSULE, LIQUID FILLED ORAL at 20:09

## 2022-01-01 RX ADMIN — SUCRALFATE 1 G: 1 TABLET ORAL at 20:51

## 2022-01-01 RX ADMIN — OXYCODONE 10 MG: 5 TABLET ORAL at 11:40

## 2022-01-01 RX ADMIN — INSULIN LISPRO 3 UNITS: 100 INJECTION, SOLUTION INTRAVENOUS; SUBCUTANEOUS at 11:25

## 2022-01-01 RX ADMIN — Medication 3 ML: at 21:33

## 2022-01-01 RX ADMIN — SODIUM CHLORIDE 500 ML: 9 INJECTION, SOLUTION INTRAVENOUS at 02:41

## 2022-01-01 RX ADMIN — IPRATROPIUM BROMIDE AND ALBUTEROL SULFATE 3 ML: .5; 3 SOLUTION RESPIRATORY (INHALATION) at 10:40

## 2022-01-01 RX ADMIN — OXYCODONE 10 MG: 5 TABLET ORAL at 13:19

## 2022-01-01 RX ADMIN — IPRATROPIUM BROMIDE AND ALBUTEROL SULFATE 3 ML: 2.5; .5 SOLUTION RESPIRATORY (INHALATION) at 07:29

## 2022-01-01 RX ADMIN — OXYCODONE 10 MG: 5 TABLET ORAL at 21:21

## 2022-01-01 RX ADMIN — INSULIN LISPRO 5 UNITS: 100 INJECTION, SOLUTION INTRAVENOUS; SUBCUTANEOUS at 10:12

## 2022-01-01 RX ADMIN — RANOLAZINE 500 MG: 500 TABLET, FILM COATED, EXTENDED RELEASE ORAL at 21:58

## 2022-01-01 RX ADMIN — RANOLAZINE 500 MG: 500 TABLET, FILM COATED, EXTENDED RELEASE ORAL at 08:11

## 2022-01-01 RX ADMIN — HYDROMORPHONE HYDROCHLORIDE 0.5 MG: 1 INJECTION, SOLUTION INTRAMUSCULAR; INTRAVENOUS; SUBCUTANEOUS at 09:46

## 2022-01-01 RX ADMIN — OXYCODONE 10 MG: 5 TABLET ORAL at 15:37

## 2022-01-01 RX ADMIN — BENZONATATE 200 MG: 100 CAPSULE ORAL at 09:39

## 2022-01-01 RX ADMIN — BACITRACIN 1 APPLICATION: 500 OINTMENT TOPICAL at 20:53

## 2022-01-01 RX ADMIN — REGADENOSON 0.4 MG: 0.08 INJECTION, SOLUTION INTRAVENOUS at 10:48

## 2022-01-01 RX ADMIN — SODIUM CHLORIDE 250 MG: 9 INJECTION, SOLUTION INTRAVENOUS at 22:04

## 2022-01-01 RX ADMIN — SUCRALFATE 1 G: 1 TABLET ORAL at 17:24

## 2022-01-01 RX ADMIN — BUDESONIDE 0.5 MG: 0.5 INHALANT RESPIRATORY (INHALATION) at 06:31

## 2022-01-01 RX ADMIN — HYDROMORPHONE HYDROCHLORIDE 0.5 MG: 1 INJECTION, SOLUTION INTRAMUSCULAR; INTRAVENOUS; SUBCUTANEOUS at 04:12

## 2022-01-01 RX ADMIN — AMPICILLIN SODIUM AND SULBACTAM SODIUM 1.5 G: 1; .5 INJECTION, POWDER, FOR SOLUTION INTRAMUSCULAR; INTRAVENOUS at 11:22

## 2022-01-01 RX ADMIN — OXYCODONE 10 MG: 5 TABLET ORAL at 11:57

## 2022-01-01 RX ADMIN — HYDROMORPHONE HYDROCHLORIDE 1 MG: 1 INJECTION, SOLUTION INTRAMUSCULAR; INTRAVENOUS; SUBCUTANEOUS at 05:08

## 2022-01-01 RX ADMIN — PREDNISONE 30 MG: 20 TABLET ORAL at 08:14

## 2022-01-01 RX ADMIN — IPRATROPIUM BROMIDE AND ALBUTEROL SULFATE 3 ML: .5; 3 SOLUTION RESPIRATORY (INHALATION) at 22:06

## 2022-01-01 RX ADMIN — Medication 10 ML: at 21:05

## 2022-01-01 RX ADMIN — ATORVASTATIN CALCIUM 40 MG: 40 TABLET, FILM COATED ORAL at 08:53

## 2022-01-01 RX ADMIN — LIDOCAINE HYDROCHLORIDE 50 MG: 10 INJECTION, SOLUTION EPIDURAL; INFILTRATION; INTRACAUDAL; PERINEURAL at 14:27

## 2022-01-01 RX ADMIN — ATORVASTATIN CALCIUM 40 MG: 40 TABLET, FILM COATED ORAL at 10:14

## 2022-01-01 RX ADMIN — HYDROMORPHONE HYDROCHLORIDE 0.25 MG: 1 INJECTION, SOLUTION INTRAMUSCULAR; INTRAVENOUS; SUBCUTANEOUS at 09:14

## 2022-01-01 RX ADMIN — PREGABALIN 150 MG: 75 CAPSULE ORAL at 10:51

## 2022-01-01 RX ADMIN — GUAIFENESIN 600 MG: 600 TABLET, EXTENDED RELEASE ORAL at 10:12

## 2022-01-01 RX ADMIN — Medication 3 ML: at 19:40

## 2022-01-01 RX ADMIN — HYDROMORPHONE HYDROCHLORIDE 1 MG: 1 INJECTION, SOLUTION INTRAMUSCULAR; INTRAVENOUS; SUBCUTANEOUS at 15:22

## 2022-01-01 RX ADMIN — HYDROMORPHONE HYDROCHLORIDE 0.25 MG: 1 INJECTION, SOLUTION INTRAMUSCULAR; INTRAVENOUS; SUBCUTANEOUS at 18:00

## 2022-01-01 RX ADMIN — BUDESONIDE 0.5 MG: 0.5 INHALANT RESPIRATORY (INHALATION) at 07:34

## 2022-01-01 RX ADMIN — MIRTAZAPINE 30 MG: 15 TABLET, FILM COATED ORAL at 01:00

## 2022-01-01 RX ADMIN — SUCRALFATE 1 G: 1 TABLET ORAL at 08:10

## 2022-01-01 RX ADMIN — DOCUSATE SODIUM 200 MG: 100 CAPSULE, LIQUID FILLED ORAL at 07:51

## 2022-01-01 RX ADMIN — FUROSEMIDE 20 MG: 10 INJECTION, SOLUTION INTRAMUSCULAR; INTRAVENOUS at 10:12

## 2022-01-01 RX ADMIN — ONDANSETRON 4 MG: 2 INJECTION INTRAMUSCULAR; INTRAVENOUS at 20:21

## 2022-01-01 RX ADMIN — AMOXICILLIN AND CLAVULANATE POTASSIUM 1 TABLET: 875; 125 TABLET, FILM COATED ORAL at 08:50

## 2022-01-01 RX ADMIN — Medication 3 ML: at 21:12

## 2022-01-01 RX ADMIN — HYDROMORPHONE HYDROCHLORIDE 0.5 MG: 1 INJECTION, SOLUTION INTRAMUSCULAR; INTRAVENOUS; SUBCUTANEOUS at 03:02

## 2022-01-01 RX ADMIN — IPRATROPIUM BROMIDE AND ALBUTEROL SULFATE 3 ML: .5; 3 SOLUTION RESPIRATORY (INHALATION) at 14:50

## 2022-01-01 RX ADMIN — MAGNESIUM SULFATE HEPTAHYDRATE 2 G: 2 INJECTION, SOLUTION INTRAVENOUS at 11:25

## 2022-01-01 RX ADMIN — HYDROMORPHONE HYDROCHLORIDE 0.5 MG: 1 INJECTION, SOLUTION INTRAMUSCULAR; INTRAVENOUS; SUBCUTANEOUS at 15:00

## 2022-01-01 RX ADMIN — OXYCODONE 5 MG: 5 TABLET ORAL at 06:13

## 2022-01-01 RX ADMIN — FAMOTIDINE 20 MG: 20 TABLET ORAL at 17:27

## 2022-01-01 RX ADMIN — BACITRACIN 1 APPLICATION: 500 OINTMENT TOPICAL at 08:02

## 2022-01-01 RX ADMIN — METOPROLOL TARTRATE 50 MG: 50 TABLET, FILM COATED ORAL at 08:18

## 2022-01-01 RX ADMIN — TRAZODONE HYDROCHLORIDE 100 MG: 100 TABLET ORAL at 19:52

## 2022-01-01 RX ADMIN — Medication 10 ML: at 08:01

## 2022-01-01 RX ADMIN — DOCUSATE SODIUM 200 MG: 100 CAPSULE, LIQUID FILLED ORAL at 08:04

## 2022-01-01 RX ADMIN — CEFEPIME HYDROCHLORIDE 2 G: 2 INJECTION, POWDER, FOR SOLUTION INTRAVENOUS at 21:50

## 2022-01-01 RX ADMIN — ATORVASTATIN CALCIUM 40 MG: 40 TABLET, FILM COATED ORAL at 08:35

## 2022-01-01 RX ADMIN — ENOXAPARIN SODIUM 120 MG: 150 INJECTION SUBCUTANEOUS at 12:08

## 2022-01-01 RX ADMIN — HYDROMORPHONE HYDROCHLORIDE 0.5 MG: 1 INJECTION, SOLUTION INTRAMUSCULAR; INTRAVENOUS; SUBCUTANEOUS at 13:26

## 2022-01-01 RX ADMIN — ONDANSETRON 4 MG: 2 INJECTION INTRAMUSCULAR; INTRAVENOUS at 16:42

## 2022-01-01 RX ADMIN — METOPROLOL TARTRATE 25 MG: 25 TABLET, FILM COATED ORAL at 10:13

## 2022-01-01 RX ADMIN — Medication 3 ML: at 08:36

## 2022-01-01 RX ADMIN — HYDROMORPHONE HYDROCHLORIDE 0.5 MG: 1 INJECTION, SOLUTION INTRAMUSCULAR; INTRAVENOUS; SUBCUTANEOUS at 11:23

## 2022-01-01 RX ADMIN — AMPICILLIN SODIUM AND SULBACTAM SODIUM 1.5 G: 1; .5 INJECTION, POWDER, FOR SOLUTION INTRAMUSCULAR; INTRAVENOUS at 23:34

## 2022-01-01 RX ADMIN — IPRATROPIUM BROMIDE AND ALBUTEROL SULFATE 3 ML: 2.5; .5 SOLUTION RESPIRATORY (INHALATION) at 05:47

## 2022-01-01 RX ADMIN — FAMOTIDINE 20 MG: 20 TABLET, FILM COATED ORAL at 17:44

## 2022-01-01 RX ADMIN — GLIPIZIDE 5 MG: 5 TABLET ORAL at 18:00

## 2022-01-01 RX ADMIN — PROPOFOL 80 MG: 10 INJECTION, EMULSION INTRAVENOUS at 14:27

## 2022-01-01 RX ADMIN — IPRATROPIUM BROMIDE AND ALBUTEROL SULFATE 3 ML: .5; 3 SOLUTION RESPIRATORY (INHALATION) at 03:59

## 2022-01-01 RX ADMIN — OXYCODONE 10 MG: 5 TABLET ORAL at 22:33

## 2022-01-01 RX ADMIN — INSULIN LISPRO 12 UNITS: 100 INJECTION, SOLUTION INTRAVENOUS; SUBCUTANEOUS at 11:44

## 2022-01-01 RX ADMIN — VANCOMYCIN HYDROCHLORIDE 2000 MG: 10 INJECTION, POWDER, LYOPHILIZED, FOR SOLUTION INTRAVENOUS at 19:39

## 2022-01-01 RX ADMIN — ACETAMINOPHEN 650 MG: 325 TABLET, FILM COATED ORAL at 21:32

## 2022-01-01 RX ADMIN — HYDROMORPHONE HYDROCHLORIDE 0.25 MG: 1 INJECTION, SOLUTION INTRAMUSCULAR; INTRAVENOUS; SUBCUTANEOUS at 05:11

## 2022-01-01 RX ADMIN — INSULIN LISPRO 2 UNITS: 100 INJECTION, SOLUTION INTRAVENOUS; SUBCUTANEOUS at 12:16

## 2022-01-01 RX ADMIN — PREGABALIN 150 MG: 75 CAPSULE ORAL at 21:29

## 2022-01-01 RX ADMIN — DOCUSATE SODIUM 200 MG: 100 CAPSULE, LIQUID FILLED ORAL at 21:11

## 2022-01-01 RX ADMIN — PROPOFOL 100 MG: 10 INJECTION, EMULSION INTRAVENOUS at 14:43

## 2022-01-01 RX ADMIN — POLYETHYLENE GLYCOL 3350 17 G: 17 POWDER, FOR SOLUTION ORAL at 09:52

## 2022-01-01 RX ADMIN — GLIPIZIDE 5 MG: 5 TABLET ORAL at 07:58

## 2022-01-01 RX ADMIN — APIXABAN 5 MG: 5 TABLET, FILM COATED ORAL at 15:25

## 2022-01-01 RX ADMIN — HYDROMORPHONE HYDROCHLORIDE 0.25 MG: 1 INJECTION, SOLUTION INTRAMUSCULAR; INTRAVENOUS; SUBCUTANEOUS at 10:55

## 2022-01-01 RX ADMIN — PANTOPRAZOLE SODIUM 40 MG: 40 INJECTION, POWDER, FOR SOLUTION INTRAVENOUS at 16:58

## 2022-01-01 RX ADMIN — METOLAZONE 5 MG: 5 TABLET ORAL at 15:25

## 2022-01-01 RX ADMIN — INSULIN HUMAN 8 UNITS: 100 INJECTION, SOLUTION PARENTERAL at 11:33

## 2022-01-01 RX ADMIN — HYDROMORPHONE HYDROCHLORIDE 0.5 MG: 1 INJECTION, SOLUTION INTRAMUSCULAR; INTRAVENOUS; SUBCUTANEOUS at 07:54

## 2022-01-01 RX ADMIN — HYDROMORPHONE HYDROCHLORIDE 0.5 MG: 1 INJECTION, SOLUTION INTRAMUSCULAR; INTRAVENOUS; SUBCUTANEOUS at 04:30

## 2022-01-01 RX ADMIN — HYDROMORPHONE HYDROCHLORIDE 1 MG: 1 INJECTION, SOLUTION INTRAMUSCULAR; INTRAVENOUS; SUBCUTANEOUS at 18:05

## 2022-01-01 RX ADMIN — RANOLAZINE 500 MG: 500 TABLET, FILM COATED, EXTENDED RELEASE ORAL at 20:07

## 2022-01-01 RX ADMIN — IPRATROPIUM BROMIDE AND ALBUTEROL SULFATE 3 ML: .5; 3 SOLUTION RESPIRATORY (INHALATION) at 08:03

## 2022-01-01 RX ADMIN — HYDROMORPHONE HYDROCHLORIDE 0.5 MG: 1 INJECTION, SOLUTION INTRAMUSCULAR; INTRAVENOUS; SUBCUTANEOUS at 01:41

## 2022-01-01 RX ADMIN — INSULIN LISPRO 12 UNITS: 100 INJECTION, SOLUTION INTRAVENOUS; SUBCUTANEOUS at 08:12

## 2022-01-01 RX ADMIN — RANOLAZINE 500 MG: 500 TABLET, FILM COATED, EXTENDED RELEASE ORAL at 21:53

## 2022-01-01 RX ADMIN — ENOXAPARIN SODIUM 120 MG: 150 INJECTION SUBCUTANEOUS at 00:10

## 2022-01-01 RX ADMIN — HYDROMORPHONE HYDROCHLORIDE 0.25 MG: 1 INJECTION, SOLUTION INTRAMUSCULAR; INTRAVENOUS; SUBCUTANEOUS at 15:05

## 2022-01-01 RX ADMIN — HYDROMORPHONE HYDROCHLORIDE 0.25 MG: 1 INJECTION, SOLUTION INTRAMUSCULAR; INTRAVENOUS; SUBCUTANEOUS at 06:33

## 2022-01-01 RX ADMIN — PREGABALIN 150 MG: 75 CAPSULE ORAL at 08:43

## 2022-01-01 RX ADMIN — INSULIN HUMAN 8 UNITS: 100 INJECTION, SOLUTION PARENTERAL at 18:00

## 2022-01-01 RX ADMIN — DOCUSATE SODIUM 200 MG: 100 CAPSULE, LIQUID FILLED ORAL at 08:10

## 2022-01-01 RX ADMIN — INSULIN LISPRO 20 UNITS: 100 INJECTION, SOLUTION INTRAVENOUS; SUBCUTANEOUS at 13:20

## 2022-01-01 RX ADMIN — SUCRALFATE 1 G: 1 TABLET ORAL at 07:07

## 2022-01-01 RX ADMIN — METOPROLOL TARTRATE 50 MG: 50 TABLET, FILM COATED ORAL at 17:41

## 2022-01-01 RX ADMIN — PREGABALIN 150 MG: 75 CAPSULE ORAL at 09:32

## 2022-01-01 RX ADMIN — IPRATROPIUM BROMIDE AND ALBUTEROL SULFATE 3 ML: .5; 3 SOLUTION RESPIRATORY (INHALATION) at 20:15

## 2022-01-01 RX ADMIN — IPRATROPIUM BROMIDE AND ALBUTEROL SULFATE 3 ML: .5; 3 SOLUTION RESPIRATORY (INHALATION) at 20:09

## 2022-01-01 RX ADMIN — ATORVASTATIN CALCIUM 40 MG: 40 TABLET, FILM COATED ORAL at 06:26

## 2022-01-01 RX ADMIN — METHYLPREDNISOLONE SODIUM SUCCINATE 125 MG: 125 INJECTION, POWDER, FOR SOLUTION INTRAMUSCULAR; INTRAVENOUS at 05:07

## 2022-01-01 RX ADMIN — OXYCODONE 10 MG: 5 TABLET ORAL at 15:48

## 2022-01-01 RX ADMIN — IPRATROPIUM BROMIDE AND ALBUTEROL SULFATE 3 ML: .5; 3 SOLUTION RESPIRATORY (INHALATION) at 07:29

## 2022-01-01 RX ADMIN — ROPINIROLE HYDROCHLORIDE 1 MG: 1 TABLET, FILM COATED ORAL at 21:11

## 2022-01-01 RX ADMIN — ISOSORBIDE MONONITRATE 120 MG: 60 TABLET, EXTENDED RELEASE ORAL at 08:50

## 2022-01-01 RX ADMIN — FAMOTIDINE 20 MG: 20 TABLET, FILM COATED ORAL at 08:09

## 2022-01-01 RX ADMIN — METHYLPREDNISOLONE SODIUM SUCCINATE 40 MG: 40 INJECTION, POWDER, FOR SOLUTION INTRAMUSCULAR; INTRAVENOUS at 18:17

## 2022-01-01 RX ADMIN — FUROSEMIDE 40 MG: 40 TABLET ORAL at 08:35

## 2022-01-01 RX ADMIN — HYDROMORPHONE HYDROCHLORIDE 0.5 MG: 1 INJECTION, SOLUTION INTRAMUSCULAR; INTRAVENOUS; SUBCUTANEOUS at 05:42

## 2022-01-01 RX ADMIN — INSULIN HUMAN 8 UNITS: 100 INJECTION, SOLUTION PARENTERAL at 11:23

## 2022-01-01 RX ADMIN — RANOLAZINE 500 MG: 500 TABLET, FILM COATED, EXTENDED RELEASE ORAL at 20:22

## 2022-01-01 RX ADMIN — METOPROLOL TARTRATE 50 MG: 50 TABLET, FILM COATED ORAL at 07:07

## 2022-01-01 RX ADMIN — FAMOTIDINE 20 MG: 20 TABLET, FILM COATED ORAL at 17:07

## 2022-01-01 RX ADMIN — Medication 3 ML: at 08:09

## 2022-01-01 RX ADMIN — ONDANSETRON HYDROCHLORIDE 4 MG: 4 TABLET, FILM COATED ORAL at 11:06

## 2022-01-01 RX ADMIN — HYDROMORPHONE HYDROCHLORIDE 0.5 MG: 1 INJECTION, SOLUTION INTRAMUSCULAR; INTRAVENOUS; SUBCUTANEOUS at 19:39

## 2022-01-01 RX ADMIN — ATORVASTATIN CALCIUM 40 MG: 40 TABLET, FILM COATED ORAL at 06:33

## 2022-01-01 RX ADMIN — ROPIVACAINE HYDROCHLORIDE 24 ML: 5 INJECTION EPIDURAL; INFILTRATION; PERINEURAL at 07:24

## 2022-01-01 RX ADMIN — ASPIRIN 81 MG: 81 TABLET, COATED ORAL at 09:32

## 2022-01-01 RX ADMIN — INSULIN LISPRO 8 UNITS: 100 INJECTION, SOLUTION INTRAVENOUS; SUBCUTANEOUS at 17:05

## 2022-01-01 RX ADMIN — APIXABAN 5 MG: 5 TABLET, FILM COATED ORAL at 08:24

## 2022-01-01 RX ADMIN — SODIUM CHLORIDE, POTASSIUM CHLORIDE, SODIUM LACTATE AND CALCIUM CHLORIDE 150 ML/HR: 600; 310; 30; 20 INJECTION, SOLUTION INTRAVENOUS at 14:46

## 2022-01-01 RX ADMIN — BUDESONIDE 0.5 MG: 0.5 INHALANT RESPIRATORY (INHALATION) at 19:15

## 2022-01-01 RX ADMIN — HYDROMORPHONE HYDROCHLORIDE 0.5 MG: 1 INJECTION, SOLUTION INTRAMUSCULAR; INTRAVENOUS; SUBCUTANEOUS at 15:34

## 2022-01-01 RX ADMIN — RANOLAZINE 500 MG: 500 TABLET, FILM COATED, EXTENDED RELEASE ORAL at 10:12

## 2022-01-01 RX ADMIN — METOCLOPRAMIDE 5 MG: 5 INJECTION, SOLUTION INTRAMUSCULAR; INTRAVENOUS at 19:40

## 2022-01-01 RX ADMIN — VANCOMYCIN HYDROCHLORIDE 1500 MG: 10 INJECTION, POWDER, LYOPHILIZED, FOR SOLUTION INTRAVENOUS at 23:23

## 2022-01-01 RX ADMIN — IPRATROPIUM BROMIDE AND ALBUTEROL SULFATE 3 ML: .5; 3 SOLUTION RESPIRATORY (INHALATION) at 09:05

## 2022-01-01 RX ADMIN — ISOSORBIDE MONONITRATE 120 MG: 30 TABLET, EXTENDED RELEASE ORAL at 10:12

## 2022-01-01 RX ADMIN — SODIUM CHLORIDE, POTASSIUM CHLORIDE, SODIUM LACTATE AND CALCIUM CHLORIDE 125 ML/HR: 600; 310; 30; 20 INJECTION, SOLUTION INTRAVENOUS at 00:01

## 2022-01-01 RX ADMIN — FUROSEMIDE 40 MG: 40 TABLET ORAL at 10:56

## 2022-01-01 RX ADMIN — INSULIN LISPRO 3 UNITS: 100 INJECTION, SOLUTION INTRAVENOUS; SUBCUTANEOUS at 11:07

## 2022-01-01 RX ADMIN — TRAZODONE HYDROCHLORIDE 100 MG: 100 TABLET ORAL at 20:20

## 2022-01-01 RX ADMIN — LORAZEPAM 1 MG: 2 INJECTION INTRAMUSCULAR; INTRAVENOUS at 02:30

## 2022-01-01 RX ADMIN — HYDROMORPHONE HYDROCHLORIDE 0.5 MG: 1 INJECTION, SOLUTION INTRAMUSCULAR; INTRAVENOUS; SUBCUTANEOUS at 08:26

## 2022-01-01 RX ADMIN — POLYETHYLENE GLYCOL 3350 17 G: 17 POWDER, FOR SOLUTION ORAL at 08:04

## 2022-01-01 RX ADMIN — Medication 3 ML: at 08:45

## 2022-01-01 RX ADMIN — IPRATROPIUM BROMIDE AND ALBUTEROL SULFATE 3 ML: .5; 3 SOLUTION RESPIRATORY (INHALATION) at 10:50

## 2022-01-01 RX ADMIN — OXYCODONE 10 MG: 5 TABLET ORAL at 19:52

## 2022-01-01 RX ADMIN — SUCRALFATE 1 G: 1 TABLET ORAL at 16:49

## 2022-01-01 RX ADMIN — PREGABALIN 150 MG: 75 CAPSULE ORAL at 20:33

## 2022-01-01 RX ADMIN — FAMOTIDINE 20 MG: 20 TABLET ORAL at 08:24

## 2022-01-01 RX ADMIN — INSULIN LISPRO 2 UNITS: 100 INJECTION, SOLUTION INTRAVENOUS; SUBCUTANEOUS at 12:17

## 2022-01-01 RX ADMIN — OXYCODONE 10 MG: 5 TABLET ORAL at 09:44

## 2022-01-01 RX ADMIN — OXYCODONE 10 MG: 5 TABLET ORAL at 01:06

## 2022-01-01 RX ADMIN — HYDROMORPHONE HYDROCHLORIDE 0.5 MG: 1 INJECTION, SOLUTION INTRAMUSCULAR; INTRAVENOUS; SUBCUTANEOUS at 22:30

## 2022-01-01 RX ADMIN — HYDROMORPHONE HYDROCHLORIDE 0.25 MG: 1 INJECTION, SOLUTION INTRAMUSCULAR; INTRAVENOUS; SUBCUTANEOUS at 13:08

## 2022-01-01 RX ADMIN — PREGABALIN 150 MG: 75 CAPSULE ORAL at 09:44

## 2022-01-01 RX ADMIN — ASPIRIN 81 MG: 81 TABLET, COATED ORAL at 11:58

## 2022-01-01 RX ADMIN — PREGABALIN 150 MG: 75 CAPSULE ORAL at 21:15

## 2022-01-01 RX ADMIN — IOPAMIDOL 100 ML: 755 INJECTION, SOLUTION INTRAVENOUS at 02:57

## 2022-01-01 RX ADMIN — SORBITOL SOLUTION (BULK) 30 ML: 70 SOLUTION at 10:58

## 2022-01-01 RX ADMIN — IPRATROPIUM BROMIDE AND ALBUTEROL SULFATE 3 ML: .5; 3 SOLUTION RESPIRATORY (INHALATION) at 15:52

## 2022-01-01 RX ADMIN — PREGABALIN 150 MG: 75 CAPSULE ORAL at 22:09

## 2022-01-01 RX ADMIN — ROPINIROLE HYDROCHLORIDE 1 MG: 1 TABLET, FILM COATED ORAL at 22:09

## 2022-01-01 RX ADMIN — Medication 3 ML: at 08:48

## 2022-01-01 RX ADMIN — METHYLPREDNISOLONE SODIUM SUCCINATE 40 MG: 40 INJECTION, POWDER, FOR SOLUTION INTRAMUSCULAR; INTRAVENOUS at 21:06

## 2022-01-01 RX ADMIN — Medication 10 ML: at 05:24

## 2022-01-01 RX ADMIN — ARFORMOTEROL TARTRATE 15 MCG: 15 SOLUTION RESPIRATORY (INHALATION) at 07:00

## 2022-01-01 RX ADMIN — ROPINIROLE HYDROCHLORIDE 1 MG: 1 TABLET, FILM COATED ORAL at 20:51

## 2022-01-01 RX ADMIN — HYDROMORPHONE HYDROCHLORIDE 0.5 MG: 1 INJECTION, SOLUTION INTRAMUSCULAR; INTRAVENOUS; SUBCUTANEOUS at 05:29

## 2022-01-01 RX ADMIN — HYDROMORPHONE HYDROCHLORIDE 0.5 MG: 1 INJECTION, SOLUTION INTRAMUSCULAR; INTRAVENOUS; SUBCUTANEOUS at 17:50

## 2022-01-01 RX ADMIN — RANOLAZINE 500 MG: 500 TABLET, FILM COATED, EXTENDED RELEASE ORAL at 10:13

## 2022-01-01 RX ADMIN — METHYLPREDNISOLONE SODIUM SUCCINATE 60 MG: 125 INJECTION, POWDER, FOR SOLUTION INTRAMUSCULAR; INTRAVENOUS at 12:42

## 2022-01-01 RX ADMIN — HYDROMORPHONE HYDROCHLORIDE 1 MG: 1 INJECTION, SOLUTION INTRAMUSCULAR; INTRAVENOUS; SUBCUTANEOUS at 12:42

## 2022-01-01 RX ADMIN — INSULIN LISPRO 7 UNITS: 100 INJECTION, SOLUTION INTRAVENOUS; SUBCUTANEOUS at 08:47

## 2022-01-01 RX ADMIN — APIXABAN 5 MG: 5 TABLET, FILM COATED ORAL at 21:26

## 2022-01-01 RX ADMIN — NYSTATIN: 100000 POWDER TOPICAL at 08:38

## 2022-01-01 RX ADMIN — LORAZEPAM 0.25 MG: 0.5 TABLET ORAL at 22:26

## 2022-01-01 RX ADMIN — ASPIRIN 81 MG: 81 TABLET, COATED ORAL at 08:01

## 2022-01-01 RX ADMIN — Medication 10 ML: at 20:19

## 2022-01-01 RX ADMIN — OXYCODONE 10 MG: 5 TABLET ORAL at 05:20

## 2022-01-01 RX ADMIN — IPRATROPIUM BROMIDE AND ALBUTEROL SULFATE 3 ML: .5; 3 SOLUTION RESPIRATORY (INHALATION) at 14:36

## 2022-01-01 RX ADMIN — IPRATROPIUM BROMIDE AND ALBUTEROL SULFATE 3 ML: .5; 3 SOLUTION RESPIRATORY (INHALATION) at 05:22

## 2022-01-01 RX ADMIN — PREGABALIN 150 MG: 75 CAPSULE ORAL at 08:50

## 2022-01-01 RX ADMIN — OXYCODONE 10 MG: 5 TABLET ORAL at 18:51

## 2022-01-01 RX ADMIN — INSULIN HUMAN 8 UNITS: 100 INJECTION, SOLUTION PARENTERAL at 07:35

## 2022-01-01 RX ADMIN — BUDESONIDE 0.5 MG: 0.5 INHALANT RESPIRATORY (INHALATION) at 19:59

## 2022-01-01 RX ADMIN — SUCRALFATE 1 G: 1 TABLET ORAL at 11:11

## 2022-01-01 RX ADMIN — MIRTAZAPINE 30 MG: 15 TABLET, FILM COATED ORAL at 22:11

## 2022-01-01 RX ADMIN — BENZONATATE 100 MG: 100 CAPSULE ORAL at 05:13

## 2022-01-01 RX ADMIN — Medication 3 ML: at 07:56

## 2022-01-01 RX ADMIN — INSULIN GLARGINE 10 UNITS: 100 INJECTION, SOLUTION SUBCUTANEOUS at 20:42

## 2022-01-01 RX ADMIN — HYDROMORPHONE HYDROCHLORIDE 0.5 MG: 1 INJECTION, SOLUTION INTRAMUSCULAR; INTRAVENOUS; SUBCUTANEOUS at 00:45

## 2022-01-01 RX ADMIN — FENTANYL CITRATE 50 MCG: 50 INJECTION, SOLUTION INTRAMUSCULAR; INTRAVENOUS at 07:24

## 2022-01-01 RX ADMIN — HYDROMORPHONE HYDROCHLORIDE 0.25 MG: 1 INJECTION, SOLUTION INTRAMUSCULAR; INTRAVENOUS; SUBCUTANEOUS at 08:11

## 2022-01-01 RX ADMIN — KETOROLAC TROMETHAMINE 15 MG: 15 INJECTION, SOLUTION INTRAMUSCULAR; INTRAVENOUS at 10:49

## 2022-01-01 RX ADMIN — HYDROMORPHONE HYDROCHLORIDE 0.25 MG: 1 INJECTION, SOLUTION INTRAMUSCULAR; INTRAVENOUS; SUBCUTANEOUS at 03:23

## 2022-01-01 RX ADMIN — OXYCODONE 10 MG: 5 TABLET ORAL at 15:50

## 2022-01-01 RX ADMIN — INSULIN LISPRO 9 UNITS: 100 INJECTION, SOLUTION INTRAVENOUS; SUBCUTANEOUS at 12:42

## 2022-01-01 RX ADMIN — RANOLAZINE 1000 MG: 500 TABLET, FILM COATED, EXTENDED RELEASE ORAL at 08:10

## 2022-01-01 RX ADMIN — MIRTAZAPINE 30 MG: 15 TABLET, FILM COATED ORAL at 20:40

## 2022-01-01 RX ADMIN — Medication 10 ML: at 08:05

## 2022-01-01 RX ADMIN — FAMOTIDINE 20 MG: 20 TABLET ORAL at 08:16

## 2022-01-01 RX ADMIN — PREGABALIN 150 MG: 75 CAPSULE ORAL at 21:21

## 2022-01-01 RX ADMIN — PREGABALIN 150 MG: 75 CAPSULE ORAL at 11:58

## 2022-01-01 RX ADMIN — HYDROMORPHONE HYDROCHLORIDE 0.5 MG: 1 INJECTION, SOLUTION INTRAMUSCULAR; INTRAVENOUS; SUBCUTANEOUS at 11:12

## 2022-01-01 RX ADMIN — HYDROMORPHONE HYDROCHLORIDE 0.5 MG: 1 INJECTION, SOLUTION INTRAMUSCULAR; INTRAVENOUS; SUBCUTANEOUS at 02:39

## 2022-01-01 RX ADMIN — ISOSORBIDE MONONITRATE 120 MG: 30 TABLET, EXTENDED RELEASE ORAL at 11:58

## 2022-01-01 RX ADMIN — HYDROMORPHONE HYDROCHLORIDE 0.25 MG: 1 INJECTION, SOLUTION INTRAMUSCULAR; INTRAVENOUS; SUBCUTANEOUS at 01:33

## 2022-01-01 RX ADMIN — MIRTAZAPINE 30 MG: 15 TABLET, FILM COATED ORAL at 21:21

## 2022-01-01 RX ADMIN — GUAIFENESIN 600 MG: 600 TABLET, EXTENDED RELEASE ORAL at 20:01

## 2022-01-01 RX ADMIN — AMPICILLIN SODIUM AND SULBACTAM SODIUM 1.5 G: 1; .5 INJECTION, POWDER, FOR SOLUTION INTRAMUSCULAR; INTRAVENOUS at 16:40

## 2022-01-01 RX ADMIN — SODIUM CHLORIDE, POTASSIUM CHLORIDE, SODIUM LACTATE AND CALCIUM CHLORIDE 125 ML/HR: 600; 310; 30; 20 INJECTION, SOLUTION INTRAVENOUS at 23:35

## 2022-01-01 RX ADMIN — CALCIUM GLUCONATE 2 G: 20 INJECTION, SOLUTION INTRAVENOUS at 13:33

## 2022-01-01 RX ADMIN — GUAIFENESIN 600 MG: 600 TABLET, EXTENDED RELEASE ORAL at 20:37

## 2022-01-01 RX ADMIN — ISOSORBIDE MONONITRATE 120 MG: 60 TABLET, EXTENDED RELEASE ORAL at 08:11

## 2022-01-01 RX ADMIN — RANOLAZINE 500 MG: 500 TABLET, FILM COATED, EXTENDED RELEASE ORAL at 20:20

## 2022-01-01 RX ADMIN — ISOSORBIDE MONONITRATE 120 MG: 60 TABLET, EXTENDED RELEASE ORAL at 08:17

## 2022-01-01 RX ADMIN — Medication 10 ML: at 08:12

## 2022-01-01 RX ADMIN — PREGABALIN 150 MG: 75 CAPSULE ORAL at 20:23

## 2022-01-01 RX ADMIN — PREGABALIN 150 MG: 75 CAPSULE ORAL at 20:20

## 2022-01-01 RX ADMIN — IPRATROPIUM BROMIDE AND ALBUTEROL SULFATE 3 ML: .5; 3 SOLUTION RESPIRATORY (INHALATION) at 21:17

## 2022-01-01 RX ADMIN — GUAIFENESIN 600 MG: 600 TABLET, EXTENDED RELEASE ORAL at 20:23

## 2022-01-01 RX ADMIN — FAMOTIDINE 20 MG: 20 TABLET ORAL at 16:44

## 2022-01-01 RX ADMIN — FAMOTIDINE 20 MG: 20 TABLET ORAL at 08:49

## 2022-01-01 RX ADMIN — IPRATROPIUM BROMIDE AND ALBUTEROL SULFATE 3 ML: .5; 3 SOLUTION RESPIRATORY (INHALATION) at 06:15

## 2022-01-01 RX ADMIN — PIPERACILLIN AND TAZOBACTAM 4.5 G: 4; .5 INJECTION, POWDER, FOR SOLUTION INTRAVENOUS at 09:29

## 2022-01-01 RX ADMIN — OXYCODONE 5 MG: 5 TABLET ORAL at 08:24

## 2022-01-01 RX ADMIN — METOPROLOL TARTRATE 50 MG: 50 TABLET, FILM COATED ORAL at 17:56

## 2022-01-01 RX ADMIN — INSULIN LISPRO 7 UNITS: 100 INJECTION, SOLUTION INTRAVENOUS; SUBCUTANEOUS at 13:43

## 2022-01-01 RX ADMIN — OXYCODONE 10 MG: 5 TABLET ORAL at 08:49

## 2022-01-01 RX ADMIN — OXYCODONE 10 MG: 5 TABLET ORAL at 04:47

## 2022-01-01 RX ADMIN — PROPOFOL 40 MG: 10 INJECTION, EMULSION INTRAVENOUS at 14:29

## 2022-01-01 RX ADMIN — METOPROLOL TARTRATE 25 MG: 25 TABLET, FILM COATED ORAL at 08:02

## 2022-01-01 RX ADMIN — HYDROMORPHONE HYDROCHLORIDE 1 MG: 1 INJECTION, SOLUTION INTRAMUSCULAR; INTRAVENOUS; SUBCUTANEOUS at 18:24

## 2022-01-01 RX ADMIN — HYDROMORPHONE HYDROCHLORIDE 0.5 MG: 1 INJECTION, SOLUTION INTRAMUSCULAR; INTRAVENOUS; SUBCUTANEOUS at 01:50

## 2022-01-01 RX ADMIN — METOPROLOL TARTRATE 50 MG: 50 TABLET, FILM COATED ORAL at 08:17

## 2022-01-01 RX ADMIN — LIDOCAINE HYDROCHLORIDE 50 MG: 10 INJECTION, SOLUTION EPIDURAL; INFILTRATION; INTRACAUDAL at 08:35

## 2022-01-01 RX ADMIN — HYDROMORPHONE HYDROCHLORIDE 1 MG: 1 INJECTION, SOLUTION INTRAMUSCULAR; INTRAVENOUS; SUBCUTANEOUS at 20:25

## 2022-01-01 RX ADMIN — GLIPIZIDE 5 MG: 5 TABLET ORAL at 10:09

## 2022-01-01 RX ADMIN — PREGABALIN 150 MG: 75 CAPSULE ORAL at 20:48

## 2022-01-01 RX ADMIN — RANOLAZINE 1000 MG: 500 TABLET, FILM COATED, EXTENDED RELEASE ORAL at 08:04

## 2022-01-01 RX ADMIN — GLIPIZIDE 5 MG: 5 TABLET ORAL at 07:12

## 2022-01-01 RX ADMIN — Medication 3 ML: at 08:27

## 2022-01-01 RX ADMIN — SUCRALFATE 1 G: 1 TABLET ORAL at 20:44

## 2022-01-01 RX ADMIN — BUDESONIDE 0.5 MG: 0.5 INHALANT RESPIRATORY (INHALATION) at 06:15

## 2022-01-01 RX ADMIN — METOPROLOL TARTRATE 25 MG: 25 TABLET, FILM COATED ORAL at 09:44

## 2022-01-01 RX ADMIN — ROCURONIUM BROMIDE 25 MG: 10 INJECTION, SOLUTION INTRAVENOUS at 08:00

## 2022-01-01 RX ADMIN — BACITRACIN 1 APPLICATION: 500 OINTMENT TOPICAL at 17:35

## 2022-01-01 RX ADMIN — CEFUROXIME AXETIL 500 MG: 500 TABLET, FILM COATED ORAL at 15:44

## 2022-01-01 RX ADMIN — FUROSEMIDE 40 MG: 40 TABLET ORAL at 08:02

## 2022-01-01 RX ADMIN — GLIPIZIDE 5 MG: 5 TABLET ORAL at 08:11

## 2022-01-01 RX ADMIN — ATORVASTATIN CALCIUM 40 MG: 40 TABLET, FILM COATED ORAL at 06:13

## 2022-01-01 RX ADMIN — METOPROLOL TARTRATE 25 MG: 25 TABLET, FILM COATED ORAL at 20:36

## 2022-01-01 RX ADMIN — OXYCODONE 7.5 MG: 5 TABLET ORAL at 19:37

## 2022-01-01 RX ADMIN — Medication 10 ML: at 03:00

## 2022-01-01 RX ADMIN — IPRATROPIUM BROMIDE AND ALBUTEROL SULFATE 3 ML: .5; 3 SOLUTION RESPIRATORY (INHALATION) at 06:42

## 2022-01-01 RX ADMIN — OXYCODONE 10 MG: 5 TABLET ORAL at 10:16

## 2022-01-01 RX ADMIN — GLIPIZIDE 5 MG: 5 TABLET ORAL at 06:33

## 2022-01-01 RX ADMIN — SODIUM CHLORIDE, POTASSIUM CHLORIDE, SODIUM LACTATE AND CALCIUM CHLORIDE 125 ML/HR: 600; 310; 30; 20 INJECTION, SOLUTION INTRAVENOUS at 14:34

## 2022-01-01 RX ADMIN — METOCLOPRAMIDE 5 MG: 5 TABLET ORAL at 12:50

## 2022-01-01 RX ADMIN — APIXABAN 5 MG: 5 TABLET, FILM COATED ORAL at 21:11

## 2022-01-01 RX ADMIN — HYDROMORPHONE HYDROCHLORIDE 0.5 MG: 1 INJECTION, SOLUTION INTRAMUSCULAR; INTRAVENOUS; SUBCUTANEOUS at 17:43

## 2022-01-01 RX ADMIN — IPRATROPIUM BROMIDE AND ALBUTEROL SULFATE 3 ML: .5; 3 SOLUTION RESPIRATORY (INHALATION) at 18:45

## 2022-01-01 RX ADMIN — GLIPIZIDE 5 MG: 5 TABLET ORAL at 16:44

## 2022-01-01 RX ADMIN — HYDROMORPHONE HYDROCHLORIDE 0.25 MG: 1 INJECTION, SOLUTION INTRAMUSCULAR; INTRAVENOUS; SUBCUTANEOUS at 03:00

## 2022-01-01 RX ADMIN — CEFUROXIME AXETIL 500 MG: 500 TABLET, FILM COATED ORAL at 16:18

## 2022-01-01 RX ADMIN — DOCUSATE SODIUM 200 MG: 100 CAPSULE, LIQUID FILLED ORAL at 08:53

## 2022-01-01 RX ADMIN — IPRATROPIUM BROMIDE AND ALBUTEROL SULFATE 3 ML: .5; 3 SOLUTION RESPIRATORY (INHALATION) at 23:12

## 2022-01-01 RX ADMIN — HYDROMORPHONE HYDROCHLORIDE 0.5 MG: 1 INJECTION, SOLUTION INTRAMUSCULAR; INTRAVENOUS; SUBCUTANEOUS at 10:48

## 2022-01-01 RX ADMIN — APIXABAN 5 MG: 5 TABLET, FILM COATED ORAL at 20:48

## 2022-01-01 RX ADMIN — HYDROMORPHONE HYDROCHLORIDE 0.5 MG: 1 INJECTION, SOLUTION INTRAMUSCULAR; INTRAVENOUS; SUBCUTANEOUS at 17:25

## 2022-01-01 RX ADMIN — IPRATROPIUM BROMIDE AND ALBUTEROL SULFATE 3 ML: .5; 3 SOLUTION RESPIRATORY (INHALATION) at 10:48

## 2022-01-01 RX ADMIN — RANOLAZINE 1000 MG: 500 TABLET, FILM COATED, EXTENDED RELEASE ORAL at 08:35

## 2022-01-01 RX ADMIN — FAMOTIDINE 20 MG: 20 TABLET ORAL at 16:40

## 2022-01-01 RX ADMIN — CEFEPIME HYDROCHLORIDE 2 G: 2 INJECTION, POWDER, FOR SOLUTION INTRAVENOUS at 05:31

## 2022-01-01 RX ADMIN — RANOLAZINE 500 MG: 500 TABLET, FILM COATED, EXTENDED RELEASE ORAL at 20:31

## 2022-01-01 RX ADMIN — INSULIN LISPRO 5 UNITS: 100 INJECTION, SOLUTION INTRAVENOUS; SUBCUTANEOUS at 13:08

## 2022-01-01 RX ADMIN — SUCRALFATE 1 G: 1 TABLET ORAL at 17:44

## 2022-01-01 RX ADMIN — OXYCODONE 10 MG: 5 TABLET ORAL at 14:11

## 2022-01-01 RX ADMIN — HYDROMORPHONE HYDROCHLORIDE 0.5 MG: 1 INJECTION, SOLUTION INTRAMUSCULAR; INTRAVENOUS; SUBCUTANEOUS at 20:27

## 2022-01-01 RX ADMIN — KETOROLAC TROMETHAMINE 15 MG: 15 INJECTION, SOLUTION INTRAMUSCULAR; INTRAVENOUS at 19:35

## 2022-01-01 RX ADMIN — AMPICILLIN SODIUM AND SULBACTAM SODIUM 1.5 G: 1; .5 INJECTION, POWDER, FOR SOLUTION INTRAMUSCULAR; INTRAVENOUS at 04:57

## 2022-01-01 RX ADMIN — VANCOMYCIN HYDROCHLORIDE 1750 MG: 10 INJECTION, POWDER, LYOPHILIZED, FOR SOLUTION INTRAVENOUS at 13:44

## 2022-01-01 RX ADMIN — GUAIFENESIN 600 MG: 600 TABLET, EXTENDED RELEASE ORAL at 20:08

## 2022-01-01 RX ADMIN — PANTOPRAZOLE SODIUM 40 MG: 40 INJECTION, POWDER, FOR SOLUTION INTRAVENOUS at 17:13

## 2022-01-01 RX ADMIN — METOPROLOL TARTRATE 25 MG: 25 TABLET, FILM COATED ORAL at 09:07

## 2022-01-01 RX ADMIN — METOCLOPRAMIDE 5 MG: 5 INJECTION, SOLUTION INTRAMUSCULAR; INTRAVENOUS at 01:47

## 2022-01-01 RX ADMIN — PIPERACILLIN AND TAZOBACTAM 4.5 G: 4; .5 INJECTION, POWDER, FOR SOLUTION INTRAVENOUS at 00:27

## 2022-01-01 RX ADMIN — IPRATROPIUM BROMIDE AND ALBUTEROL SULFATE 3 ML: .5; 3 SOLUTION RESPIRATORY (INHALATION) at 06:59

## 2022-01-01 RX ADMIN — OXYCODONE 10 MG: 5 TABLET ORAL at 06:05

## 2022-01-01 RX ADMIN — HYDROMORPHONE HYDROCHLORIDE 0.25 MG: 1 INJECTION, SOLUTION INTRAMUSCULAR; INTRAVENOUS; SUBCUTANEOUS at 21:13

## 2022-01-01 RX ADMIN — CEFEPIME HYDROCHLORIDE 2 G: 2 INJECTION, POWDER, FOR SOLUTION INTRAVENOUS at 23:38

## 2022-01-01 RX ADMIN — HYDROMORPHONE HYDROCHLORIDE 0.25 MG: 1 INJECTION, SOLUTION INTRAMUSCULAR; INTRAVENOUS; SUBCUTANEOUS at 20:50

## 2022-01-01 RX ADMIN — BUDESONIDE 0.5 MG: 0.5 INHALANT RESPIRATORY (INHALATION) at 07:00

## 2022-01-01 RX ADMIN — MIRTAZAPINE 30 MG: 15 TABLET, FILM COATED ORAL at 21:51

## 2022-01-01 RX ADMIN — ROPINIROLE HYDROCHLORIDE 1 MG: 1 TABLET, FILM COATED ORAL at 20:53

## 2022-01-01 RX ADMIN — PIPERACILLIN AND TAZOBACTAM 4.5 G: 4; .5 INJECTION, POWDER, FOR SOLUTION INTRAVENOUS at 18:51

## 2022-01-01 RX ADMIN — INSULIN LISPRO 2 UNITS: 100 INJECTION, SOLUTION INTRAVENOUS; SUBCUTANEOUS at 16:48

## 2022-01-01 RX ADMIN — FAMOTIDINE 20 MG: 20 TABLET, FILM COATED ORAL at 07:34

## 2022-01-01 RX ADMIN — ONDANSETRON 8 MG: 2 INJECTION INTRAMUSCULAR; INTRAVENOUS at 14:06

## 2022-01-01 RX ADMIN — HYDROMORPHONE HYDROCHLORIDE 1 MG: 1 INJECTION, SOLUTION INTRAMUSCULAR; INTRAVENOUS; SUBCUTANEOUS at 20:37

## 2022-01-01 RX ADMIN — GUAIFENESIN 600 MG: 600 TABLET, EXTENDED RELEASE ORAL at 08:14

## 2022-01-01 RX ADMIN — TRAZODONE HYDROCHLORIDE 100 MG: 100 TABLET ORAL at 21:29

## 2022-01-01 RX ADMIN — FAMOTIDINE 20 MG: 20 TABLET ORAL at 18:04

## 2022-01-01 RX ADMIN — RANOLAZINE 1000 MG: 500 TABLET, FILM COATED, EXTENDED RELEASE ORAL at 07:54

## 2022-01-01 RX ADMIN — OXYCODONE 10 MG: 5 TABLET ORAL at 00:42

## 2022-01-01 RX ADMIN — METOPROLOL TARTRATE 25 MG: 25 TABLET, FILM COATED ORAL at 21:22

## 2022-01-01 RX ADMIN — AMPICILLIN SODIUM AND SULBACTAM SODIUM 1.5 G: 1; .5 INJECTION, POWDER, FOR SOLUTION INTRAMUSCULAR; INTRAVENOUS at 11:07

## 2022-01-01 RX ADMIN — Medication 10 ML: at 21:23

## 2022-01-01 RX ADMIN — PREGABALIN 150 MG: 75 CAPSULE ORAL at 10:12

## 2022-01-01 RX ADMIN — IPRATROPIUM BROMIDE AND ALBUTEROL SULFATE 3 ML: .5; 3 SOLUTION RESPIRATORY (INHALATION) at 11:04

## 2022-01-01 RX ADMIN — ATORVASTATIN CALCIUM 40 MG: 40 TABLET, FILM COATED ORAL at 21:58

## 2022-01-01 RX ADMIN — HYDROMORPHONE HYDROCHLORIDE 0.25 MG: 1 INJECTION, SOLUTION INTRAMUSCULAR; INTRAVENOUS; SUBCUTANEOUS at 02:44

## 2022-01-01 RX ADMIN — HYDROMORPHONE HYDROCHLORIDE 0.25 MG: 1 INJECTION, SOLUTION INTRAMUSCULAR; INTRAVENOUS; SUBCUTANEOUS at 10:47

## 2022-01-01 RX ADMIN — HYDROMORPHONE HYDROCHLORIDE 0.5 MG: 1 INJECTION, SOLUTION INTRAMUSCULAR; INTRAVENOUS; SUBCUTANEOUS at 13:38

## 2022-01-01 RX ADMIN — FUROSEMIDE 40 MG: 40 TABLET ORAL at 08:49

## 2022-01-01 RX ADMIN — Medication 3 ML: at 08:44

## 2022-01-01 RX ADMIN — PREGABALIN 150 MG: 75 CAPSULE ORAL at 20:36

## 2022-01-01 RX ADMIN — BUDESONIDE 0.5 MG: 0.5 INHALANT RESPIRATORY (INHALATION) at 19:29

## 2022-01-01 RX ADMIN — TRAZODONE HYDROCHLORIDE 100 MG: 100 TABLET ORAL at 20:01

## 2022-01-01 RX ADMIN — ONDANSETRON 4 MG: 2 INJECTION INTRAMUSCULAR; INTRAVENOUS at 17:12

## 2022-01-01 RX ADMIN — FAMOTIDINE 20 MG: 20 TABLET, FILM COATED ORAL at 07:54

## 2022-01-01 RX ADMIN — IPRATROPIUM BROMIDE AND ALBUTEROL SULFATE 3 ML: .5; 3 SOLUTION RESPIRATORY (INHALATION) at 14:57

## 2022-01-01 RX ADMIN — ENOXAPARIN SODIUM 120 MG: 150 INJECTION SUBCUTANEOUS at 23:38

## 2022-01-01 RX ADMIN — ISOSORBIDE MONONITRATE 120 MG: 60 TABLET, EXTENDED RELEASE ORAL at 09:28

## 2022-01-01 RX ADMIN — HYDROMORPHONE HYDROCHLORIDE 0.5 MG: 1 INJECTION, SOLUTION INTRAMUSCULAR; INTRAVENOUS; SUBCUTANEOUS at 00:01

## 2022-01-01 RX ADMIN — IPRATROPIUM BROMIDE AND ALBUTEROL SULFATE 3 ML: .5; 3 SOLUTION RESPIRATORY (INHALATION) at 06:30

## 2022-01-01 RX ADMIN — ONDANSETRON 4 MG: 2 INJECTION INTRAMUSCULAR; INTRAVENOUS at 08:15

## 2022-01-01 RX ADMIN — APIXABAN 5 MG: 5 TABLET, FILM COATED ORAL at 11:23

## 2022-01-01 RX ADMIN — RANOLAZINE 500 MG: 500 TABLET, FILM COATED, EXTENDED RELEASE ORAL at 08:43

## 2022-01-01 RX ADMIN — NASAL DECONGESTANT 2 SPRAY: 0.05 SPRAY NASAL at 08:35

## 2022-01-01 RX ADMIN — INSULIN HUMAN 8 UNITS: 100 INJECTION, SOLUTION PARENTERAL at 08:15

## 2022-01-01 RX ADMIN — OXYCODONE 5 MG: 5 TABLET ORAL at 15:32

## 2022-01-01 RX ADMIN — INSULIN LISPRO 3 UNITS: 100 INJECTION, SOLUTION INTRAVENOUS; SUBCUTANEOUS at 12:46

## 2022-01-01 RX ADMIN — ISOSORBIDE MONONITRATE 120 MG: 60 TABLET, EXTENDED RELEASE ORAL at 08:15

## 2022-01-01 RX ADMIN — HYDROMORPHONE HYDROCHLORIDE 0.25 MG: 1 INJECTION, SOLUTION INTRAMUSCULAR; INTRAVENOUS; SUBCUTANEOUS at 15:48

## 2022-01-01 RX ADMIN — RANOLAZINE 1000 MG: 500 TABLET, FILM COATED, EXTENDED RELEASE ORAL at 10:57

## 2022-01-01 RX ADMIN — OXYCODONE 5 MG: 5 TABLET ORAL at 13:28

## 2022-01-01 RX ADMIN — OXYCODONE 10 MG: 5 TABLET ORAL at 04:43

## 2022-01-01 RX ADMIN — HYDROMORPHONE HYDROCHLORIDE 0.25 MG: 1 INJECTION, SOLUTION INTRAMUSCULAR; INTRAVENOUS; SUBCUTANEOUS at 19:13

## 2022-01-01 RX ADMIN — HYDROMORPHONE HYDROCHLORIDE 0.5 MG: 1 INJECTION, SOLUTION INTRAMUSCULAR; INTRAVENOUS; SUBCUTANEOUS at 05:50

## 2022-01-01 RX ADMIN — INSULIN LISPRO 2 UNITS: 100 INJECTION, SOLUTION INTRAVENOUS; SUBCUTANEOUS at 08:27

## 2022-01-01 RX ADMIN — Medication 3 ML: at 08:11

## 2022-01-01 RX ADMIN — IPRATROPIUM BROMIDE AND ALBUTEROL SULFATE 3 ML: .5; 3 SOLUTION RESPIRATORY (INHALATION) at 18:48

## 2022-01-01 RX ADMIN — HYDROMORPHONE HYDROCHLORIDE 1 MG: 1 INJECTION, SOLUTION INTRAMUSCULAR; INTRAVENOUS; SUBCUTANEOUS at 16:33

## 2022-01-01 RX ADMIN — INSULIN LISPRO 3 UNITS: 100 INJECTION, SOLUTION INTRAVENOUS; SUBCUTANEOUS at 16:48

## 2022-01-01 RX ADMIN — INSULIN LISPRO 5 UNITS: 100 INJECTION, SOLUTION INTRAVENOUS; SUBCUTANEOUS at 11:22

## 2022-01-01 RX ADMIN — Medication 3 ML: at 08:20

## 2022-01-01 RX ADMIN — PREDNISONE 30 MG: 20 TABLET ORAL at 08:43

## 2022-01-01 RX ADMIN — APIXABAN 5 MG: 5 TABLET, FILM COATED ORAL at 20:01

## 2022-01-01 RX ADMIN — RANOLAZINE 500 MG: 500 TABLET, FILM COATED, EXTENDED RELEASE ORAL at 20:36

## 2022-01-01 RX ADMIN — HYDROMORPHONE HYDROCHLORIDE 1 MG: 1 INJECTION, SOLUTION INTRAMUSCULAR; INTRAVENOUS; SUBCUTANEOUS at 12:08

## 2022-01-01 RX ADMIN — HYDROMORPHONE HYDROCHLORIDE 0.5 MG: 1 INJECTION, SOLUTION INTRAMUSCULAR; INTRAVENOUS; SUBCUTANEOUS at 11:07

## 2022-01-01 RX ADMIN — Medication 10 ML: at 22:10

## 2022-01-01 RX ADMIN — CEFEPIME HYDROCHLORIDE 2 G: 2 INJECTION, POWDER, FOR SOLUTION INTRAVENOUS at 21:25

## 2022-01-01 RX ADMIN — INSULIN LISPRO 8 UNITS: 100 INJECTION, SOLUTION INTRAVENOUS; SUBCUTANEOUS at 08:08

## 2022-01-01 RX ADMIN — MIRTAZAPINE 30 MG: 15 TABLET, FILM COATED ORAL at 20:51

## 2022-01-01 RX ADMIN — HYDROMORPHONE HYDROCHLORIDE 0.5 MG: 1 INJECTION, SOLUTION INTRAMUSCULAR; INTRAVENOUS; SUBCUTANEOUS at 09:54

## 2022-01-01 RX ADMIN — DOCUSATE SODIUM 100 MG: 100 CAPSULE, LIQUID FILLED ORAL at 15:25

## 2022-01-01 RX ADMIN — FAMOTIDINE 20 MG: 20 TABLET ORAL at 18:00

## 2022-01-01 RX ADMIN — ROPINIROLE HYDROCHLORIDE 0.5 MG: 0.25 TABLET, FILM COATED ORAL at 21:25

## 2022-01-01 RX ADMIN — METOPROLOL TARTRATE 25 MG: 25 TABLET, FILM COATED ORAL at 09:32

## 2022-01-01 RX ADMIN — Medication 3 ML: at 19:31

## 2022-01-01 RX ADMIN — ISOSORBIDE MONONITRATE 120 MG: 60 TABLET, EXTENDED RELEASE ORAL at 09:33

## 2022-01-01 RX ADMIN — Medication 3 ML: at 19:54

## 2022-01-01 RX ADMIN — FAMOTIDINE 20 MG: 20 TABLET ORAL at 16:25

## 2022-01-01 RX ADMIN — FAMOTIDINE 20 MG: 20 TABLET ORAL at 07:12

## 2022-01-01 RX ADMIN — Medication 3 ML: at 20:39

## 2022-01-01 RX ADMIN — METOPROLOL TARTRATE 50 MG: 50 TABLET, FILM COATED ORAL at 09:14

## 2022-01-01 RX ADMIN — INSULIN LISPRO 7 UNITS: 100 INJECTION, SOLUTION INTRAVENOUS; SUBCUTANEOUS at 07:45

## 2022-01-01 RX ADMIN — HYDROMORPHONE HYDROCHLORIDE 0.5 MG: 1 INJECTION, SOLUTION INTRAMUSCULAR; INTRAVENOUS; SUBCUTANEOUS at 01:38

## 2022-01-01 RX ADMIN — ISOSORBIDE MONONITRATE 120 MG: 30 TABLET, EXTENDED RELEASE ORAL at 08:43

## 2022-01-01 RX ADMIN — AMPICILLIN SODIUM AND SULBACTAM SODIUM 1.5 G: 1; .5 INJECTION, POWDER, FOR SOLUTION INTRAMUSCULAR; INTRAVENOUS at 04:04

## 2022-01-01 RX ADMIN — SODIUM CHLORIDE 9 ML/HR: 9 INJECTION, SOLUTION INTRAVENOUS at 13:10

## 2022-01-01 RX ADMIN — HYDROMORPHONE HYDROCHLORIDE 0.25 MG: 1 INJECTION, SOLUTION INTRAMUSCULAR; INTRAVENOUS; SUBCUTANEOUS at 11:04

## 2022-01-01 RX ADMIN — Medication 3 ML: at 08:55

## 2022-01-01 RX ADMIN — APIXABAN 5 MG: 5 TABLET, FILM COATED ORAL at 08:35

## 2022-01-01 RX ADMIN — Medication 10 ML: at 08:15

## 2022-01-01 RX ADMIN — IPRATROPIUM BROMIDE AND ALBUTEROL SULFATE 3 ML: 2.5; .5 SOLUTION RESPIRATORY (INHALATION) at 11:06

## 2022-01-01 RX ADMIN — Medication 3 ML: at 19:53

## 2022-01-01 RX ADMIN — GUAIFENESIN 600 MG: 600 TABLET, EXTENDED RELEASE ORAL at 08:24

## 2022-01-01 RX ADMIN — HYDROMORPHONE HYDROCHLORIDE 0.5 MG: 1 INJECTION, SOLUTION INTRAMUSCULAR; INTRAVENOUS; SUBCUTANEOUS at 12:09

## 2022-01-01 RX ADMIN — ROPINIROLE HYDROCHLORIDE 1 MG: 1 TABLET, FILM COATED ORAL at 21:16

## 2022-01-01 RX ADMIN — BUDESONIDE AND FORMOTEROL FUMARATE DIHYDRATE 2 PUFF: 80; 4.5 AEROSOL RESPIRATORY (INHALATION) at 07:51

## 2022-01-01 RX ADMIN — INSULIN LISPRO 8 UNITS: 100 INJECTION, SOLUTION INTRAVENOUS; SUBCUTANEOUS at 13:07

## 2022-01-01 RX ADMIN — HYDROMORPHONE HYDROCHLORIDE 1 MG: 1 INJECTION, SOLUTION INTRAMUSCULAR; INTRAVENOUS; SUBCUTANEOUS at 13:51

## 2022-01-01 RX ADMIN — SODIUM CHLORIDE 250 MG: 9 INJECTION, SOLUTION INTRAVENOUS at 20:19

## 2022-01-01 RX ADMIN — INSULIN LISPRO 7 UNITS: 100 INJECTION, SOLUTION INTRAVENOUS; SUBCUTANEOUS at 11:37

## 2022-01-01 RX ADMIN — AMPICILLIN SODIUM AND SULBACTAM SODIUM 1.5 G: 1; .5 INJECTION, POWDER, FOR SOLUTION INTRAMUSCULAR; INTRAVENOUS at 21:25

## 2022-01-01 RX ADMIN — FUROSEMIDE 40 MG: 40 TABLET ORAL at 07:35

## 2022-01-01 RX ADMIN — APIXABAN 5 MG: 5 TABLET, FILM COATED ORAL at 20:09

## 2022-01-01 RX ADMIN — PREGABALIN 150 MG: 75 CAPSULE ORAL at 20:07

## 2022-01-01 RX ADMIN — MIRTAZAPINE 30 MG: 15 TABLET, FILM COATED ORAL at 19:39

## 2022-01-01 RX ADMIN — IPRATROPIUM BROMIDE AND ALBUTEROL SULFATE 3 ML: .5; 3 SOLUTION RESPIRATORY (INHALATION) at 18:15

## 2022-01-01 RX ADMIN — HYDROMORPHONE HYDROCHLORIDE 0.5 MG: 1 INJECTION, SOLUTION INTRAMUSCULAR; INTRAVENOUS; SUBCUTANEOUS at 05:48

## 2022-01-01 RX ADMIN — DOCUSATE SODIUM 200 MG: 100 CAPSULE, LIQUID FILLED ORAL at 08:50

## 2022-01-01 RX ADMIN — FAMOTIDINE 20 MG: 10 INJECTION INTRAVENOUS at 07:52

## 2022-01-01 RX ADMIN — PREGABALIN 150 MG: 75 CAPSULE ORAL at 08:44

## 2022-01-01 RX ADMIN — INSULIN LISPRO 12 UNITS: 100 INJECTION, SOLUTION INTRAVENOUS; SUBCUTANEOUS at 18:05

## 2022-01-01 RX ADMIN — Medication 3 ML: at 13:40

## 2022-01-01 RX ADMIN — GLIPIZIDE 5 MG: 5 TABLET ORAL at 07:55

## 2022-01-01 RX ADMIN — DOCUSATE SODIUM 200 MG: 100 CAPSULE, LIQUID FILLED ORAL at 10:51

## 2022-01-01 RX ADMIN — HYDROMORPHONE HYDROCHLORIDE 0.25 MG: 1 INJECTION, SOLUTION INTRAMUSCULAR; INTRAVENOUS; SUBCUTANEOUS at 12:57

## 2022-01-01 RX ADMIN — BUDESONIDE 0.5 MG: 0.5 INHALANT RESPIRATORY (INHALATION) at 19:34

## 2022-01-01 RX ADMIN — PIPERACILLIN AND TAZOBACTAM 4.5 G: 4; .5 INJECTION, POWDER, FOR SOLUTION INTRAVENOUS at 11:40

## 2022-01-01 RX ADMIN — AMPICILLIN SODIUM AND SULBACTAM SODIUM 1.5 G: 1; .5 INJECTION, POWDER, FOR SOLUTION INTRAMUSCULAR; INTRAVENOUS at 05:13

## 2022-01-01 RX ADMIN — CEFUROXIME AXETIL 500 MG: 500 TABLET, FILM COATED ORAL at 14:40

## 2022-01-01 RX ADMIN — SORBITOL SOLUTION (BULK) 30 ML: 70 SOLUTION at 08:04

## 2022-01-01 RX ADMIN — OXYCODONE 10 MG: 5 TABLET ORAL at 19:42

## 2022-01-01 RX ADMIN — FAMOTIDINE 20 MG: 20 TABLET ORAL at 07:15

## 2022-01-01 RX ADMIN — BUDESONIDE 0.5 MG: 0.5 INHALANT RESPIRATORY (INHALATION) at 20:28

## 2022-01-01 RX ADMIN — CEFTRIAXONE SODIUM 2 G: 2 INJECTION, POWDER, FOR SOLUTION INTRAMUSCULAR; INTRAVENOUS at 17:17

## 2022-01-01 RX ADMIN — IPRATROPIUM BROMIDE AND ALBUTEROL SULFATE 3 ML: 2.5; .5 SOLUTION RESPIRATORY (INHALATION) at 15:22

## 2022-01-01 RX ADMIN — OXYCODONE 10 MG: 5 TABLET ORAL at 18:15

## 2022-01-01 RX ADMIN — HYDROMORPHONE HYDROCHLORIDE 1 MG: 1 INJECTION, SOLUTION INTRAMUSCULAR; INTRAVENOUS; SUBCUTANEOUS at 01:13

## 2022-01-01 RX ADMIN — MIRTAZAPINE 30 MG: 15 TABLET, FILM COATED ORAL at 19:54

## 2022-01-01 RX ADMIN — TRAZODONE HYDROCHLORIDE 100 MG: 100 TABLET ORAL at 20:40

## 2022-01-01 RX ADMIN — Medication 3 ML: at 21:03

## 2022-01-01 RX ADMIN — INSULIN LISPRO 5 UNITS: 100 INJECTION, SOLUTION INTRAVENOUS; SUBCUTANEOUS at 17:42

## 2022-01-01 RX ADMIN — METOPROLOL TARTRATE 25 MG: 25 TABLET, FILM COATED ORAL at 20:23

## 2022-01-01 RX ADMIN — Medication 3 ML: at 01:58

## 2022-01-01 RX ADMIN — IPRATROPIUM BROMIDE AND ALBUTEROL SULFATE 3 ML: 2.5; .5 SOLUTION RESPIRATORY (INHALATION) at 19:27

## 2022-01-01 RX ADMIN — TRAZODONE HYDROCHLORIDE 100 MG: 100 TABLET ORAL at 20:53

## 2022-01-01 RX ADMIN — HYDROMORPHONE HYDROCHLORIDE 1 MG: 1 INJECTION, SOLUTION INTRAMUSCULAR; INTRAVENOUS; SUBCUTANEOUS at 04:36

## 2022-01-01 RX ADMIN — METOCLOPRAMIDE 5 MG: 5 INJECTION, SOLUTION INTRAMUSCULAR; INTRAVENOUS at 21:34

## 2022-01-01 RX ADMIN — INSULIN GLARGINE 10 UNITS: 100 INJECTION, SOLUTION SUBCUTANEOUS at 08:11

## 2022-01-01 RX ADMIN — APIXABAN 5 MG: 5 TABLET, FILM COATED ORAL at 09:14

## 2022-01-01 RX ADMIN — INSULIN LISPRO 16 UNITS: 100 INJECTION, SOLUTION INTRAVENOUS; SUBCUTANEOUS at 12:31

## 2022-01-01 RX ADMIN — FUROSEMIDE 40 MG: 40 TABLET ORAL at 08:04

## 2022-01-01 RX ADMIN — MIRTAZAPINE 30 MG: 15 TABLET, FILM COATED ORAL at 21:32

## 2022-01-01 RX ADMIN — CEFTRIAXONE SODIUM 2 G: 2 INJECTION, POWDER, FOR SOLUTION INTRAMUSCULAR; INTRAVENOUS at 16:46

## 2022-01-01 RX ADMIN — ATORVASTATIN CALCIUM 40 MG: 40 TABLET, FILM COATED ORAL at 05:07

## 2022-01-01 RX ADMIN — HYDROMORPHONE HYDROCHLORIDE 0.25 MG: 1 INJECTION, SOLUTION INTRAMUSCULAR; INTRAVENOUS; SUBCUTANEOUS at 23:00

## 2022-01-01 RX ADMIN — ATORVASTATIN CALCIUM 40 MG: 40 TABLET, FILM COATED ORAL at 06:16

## 2022-01-01 RX ADMIN — GLIPIZIDE 5 MG: 5 TABLET ORAL at 17:22

## 2022-01-01 RX ADMIN — PROPOFOL 20 MG: 10 INJECTION, EMULSION INTRAVENOUS at 14:38

## 2022-01-01 RX ADMIN — BUDESONIDE 0.5 MG: 0.5 INHALANT RESPIRATORY (INHALATION) at 18:28

## 2022-01-01 RX ADMIN — ATORVASTATIN CALCIUM 40 MG: 40 TABLET, FILM COATED ORAL at 05:52

## 2022-01-01 RX ADMIN — Medication 10 ML: at 08:25

## 2022-01-01 RX ADMIN — MAGNESIUM SULFATE HEPTAHYDRATE 2 G: 2 INJECTION, SOLUTION INTRAVENOUS at 18:03

## 2022-01-01 RX ADMIN — Medication 3 ML: at 11:10

## 2022-01-01 RX ADMIN — FAMOTIDINE 20 MG: 20 TABLET, FILM COATED ORAL at 18:00

## 2022-01-01 RX ADMIN — FAMOTIDINE 20 MG: 10 INJECTION INTRAVENOUS at 14:06

## 2022-01-01 RX ADMIN — PREDNISONE 30 MG: 20 TABLET ORAL at 13:15

## 2022-01-01 RX ADMIN — DOCUSATE SODIUM 200 MG: 100 CAPSULE, LIQUID FILLED ORAL at 07:44

## 2022-01-01 RX ADMIN — ASPIRIN 81 MG: 81 TABLET, COATED ORAL at 08:17

## 2022-01-01 RX ADMIN — METOPROLOL TARTRATE 25 MG: 25 TABLET, FILM COATED ORAL at 08:44

## 2022-01-01 RX ADMIN — HYDROMORPHONE HYDROCHLORIDE 1 MG: 1 INJECTION, SOLUTION INTRAMUSCULAR; INTRAVENOUS; SUBCUTANEOUS at 02:19

## 2022-01-01 RX ADMIN — METHYLPREDNISOLONE SODIUM SUCCINATE 60 MG: 125 INJECTION, POWDER, FOR SOLUTION INTRAMUSCULAR; INTRAVENOUS at 20:37

## 2022-01-01 RX ADMIN — SODIUM CHLORIDE 100 ML/HR: 9 INJECTION, SOLUTION INTRAVENOUS at 13:30

## 2022-01-01 RX ADMIN — PREGABALIN 150 MG: 75 CAPSULE ORAL at 20:09

## 2022-01-01 RX ADMIN — IPRATROPIUM BROMIDE AND ALBUTEROL SULFATE 3 ML: 2.5; .5 SOLUTION RESPIRATORY (INHALATION) at 09:21

## 2022-01-01 RX ADMIN — DOXYCYCLINE 100 MG: 100 TABLET ORAL at 08:35

## 2022-01-01 RX ADMIN — OXYCODONE 10 MG: 5 TABLET ORAL at 19:53

## 2022-01-01 RX ADMIN — HYDROMORPHONE HYDROCHLORIDE 0.5 MG: 1 INJECTION, SOLUTION INTRAMUSCULAR; INTRAVENOUS; SUBCUTANEOUS at 03:28

## 2022-01-01 RX ADMIN — ISOSORBIDE MONONITRATE 120 MG: 60 TABLET, EXTENDED RELEASE ORAL at 15:25

## 2022-01-01 RX ADMIN — VANCOMYCIN HYDROCHLORIDE 1500 MG: 10 INJECTION, POWDER, LYOPHILIZED, FOR SOLUTION INTRAVENOUS at 09:14

## 2022-01-01 RX ADMIN — ONDANSETRON 4 MG: 2 INJECTION INTRAMUSCULAR; INTRAVENOUS at 12:11

## 2022-01-01 RX ADMIN — CEFTRIAXONE SODIUM 2 G: 2 INJECTION, POWDER, FOR SOLUTION INTRAMUSCULAR; INTRAVENOUS at 17:44

## 2022-01-01 RX ADMIN — FAMOTIDINE 20 MG: 20 TABLET, FILM COATED ORAL at 17:49

## 2022-01-01 RX ADMIN — INSULIN HUMAN 8 UNITS: 100 INJECTION, SOLUTION PARENTERAL at 18:04

## 2022-01-01 RX ADMIN — IPRATROPIUM BROMIDE AND ALBUTEROL SULFATE 3 ML: .5; 3 SOLUTION RESPIRATORY (INHALATION) at 18:25

## 2022-01-01 RX ADMIN — PREGABALIN 150 MG: 75 CAPSULE ORAL at 20:50

## 2022-01-01 RX ADMIN — METOPROLOL TARTRATE 25 MG: 25 TABLET, FILM COATED ORAL at 20:01

## 2022-01-01 RX ADMIN — BENZONATATE 200 MG: 100 CAPSULE ORAL at 00:15

## 2022-01-01 RX ADMIN — METOPROLOL TARTRATE 25 MG: 25 TABLET, FILM COATED ORAL at 08:43

## 2022-01-01 RX ADMIN — IPRATROPIUM BROMIDE AND ALBUTEROL SULFATE 3 ML: .5; 3 SOLUTION RESPIRATORY (INHALATION) at 18:30

## 2022-01-01 RX ADMIN — METOPROLOL TARTRATE 25 MG: 25 TABLET, FILM COATED ORAL at 20:50

## 2022-01-01 RX ADMIN — VANCOMYCIN HYDROCHLORIDE 1500 MG: 10 INJECTION, POWDER, LYOPHILIZED, FOR SOLUTION INTRAVENOUS at 12:50

## 2022-01-01 RX ADMIN — PREGABALIN 150 MG: 75 CAPSULE ORAL at 08:10

## 2022-01-01 RX ADMIN — ISOSORBIDE MONONITRATE 120 MG: 30 TABLET, EXTENDED RELEASE ORAL at 09:07

## 2022-01-01 RX ADMIN — BENZONATATE 100 MG: 100 CAPSULE ORAL at 19:25

## 2022-01-01 RX ADMIN — BUDESONIDE 0.5 MG: 0.5 INHALANT RESPIRATORY (INHALATION) at 21:06

## 2022-01-01 RX ADMIN — GLIPIZIDE 5 MG: 5 TABLET ORAL at 08:24

## 2022-01-01 RX ADMIN — VANCOMYCIN HYDROCHLORIDE 1750 MG: 10 INJECTION, POWDER, LYOPHILIZED, FOR SOLUTION INTRAVENOUS at 09:38

## 2022-01-01 RX ADMIN — HYDROMORPHONE HYDROCHLORIDE 1 MG: 1 INJECTION, SOLUTION INTRAMUSCULAR; INTRAVENOUS; SUBCUTANEOUS at 10:42

## 2022-01-01 RX ADMIN — INSULIN HUMAN 8 UNITS: 100 INJECTION, SOLUTION PARENTERAL at 13:14

## 2022-01-01 RX ADMIN — MIRTAZAPINE 30 MG: 15 TABLET, FILM COATED ORAL at 21:53

## 2022-01-01 RX ADMIN — HYDROMORPHONE HYDROCHLORIDE 1 MG: 1 INJECTION, SOLUTION INTRAMUSCULAR; INTRAVENOUS; SUBCUTANEOUS at 20:08

## 2022-01-01 RX ADMIN — PREGABALIN 150 MG: 75 CAPSULE ORAL at 21:53

## 2022-01-01 RX ADMIN — PREGABALIN 150 MG: 75 CAPSULE ORAL at 09:13

## 2022-01-01 RX ADMIN — ONDANSETRON 4 MG: 2 INJECTION INTRAMUSCULAR; INTRAVENOUS at 21:43

## 2022-01-01 RX ADMIN — SUCRALFATE 1 G: 1 TABLET ORAL at 21:21

## 2022-01-01 RX ADMIN — METOPROLOL TARTRATE 25 MG: 25 TABLET, FILM COATED ORAL at 15:25

## 2022-01-01 RX ADMIN — SODIUM CHLORIDE 75 ML/HR: 9 INJECTION, SOLUTION INTRAVENOUS at 11:40

## 2022-01-01 RX ADMIN — FUROSEMIDE 40 MG: 40 TABLET ORAL at 09:13

## 2022-01-01 RX ADMIN — MIRTAZAPINE 30 MG: 15 TABLET, FILM COATED ORAL at 20:52

## 2022-01-01 RX ADMIN — INSULIN LISPRO 7 UNITS: 100 INJECTION, SOLUTION INTRAVENOUS; SUBCUTANEOUS at 17:06

## 2022-01-01 RX ADMIN — BUDESONIDE 0.5 MG: 0.5 INHALANT RESPIRATORY (INHALATION) at 15:52

## 2022-01-01 RX ADMIN — PIPERACILLIN AND TAZOBACTAM 4.5 G: 4; .5 INJECTION, POWDER, FOR SOLUTION INTRAVENOUS at 00:09

## 2022-01-01 RX ADMIN — HYDROMORPHONE HYDROCHLORIDE 0.5 MG: 1 INJECTION, SOLUTION INTRAMUSCULAR; INTRAVENOUS; SUBCUTANEOUS at 07:44

## 2022-01-01 RX ADMIN — HYDROMORPHONE HYDROCHLORIDE 1 MG: 1 INJECTION, SOLUTION INTRAMUSCULAR; INTRAVENOUS; SUBCUTANEOUS at 13:20

## 2022-01-01 RX ADMIN — METOPROLOL TARTRATE 25 MG: 25 TABLET, FILM COATED ORAL at 21:06

## 2022-01-01 RX ADMIN — HYDROMORPHONE HYDROCHLORIDE 0.25 MG: 1 INJECTION, SOLUTION INTRAMUSCULAR; INTRAVENOUS; SUBCUTANEOUS at 20:48

## 2022-01-01 RX ADMIN — SUCRALFATE 1 G: 1 TABLET ORAL at 22:20

## 2022-01-01 RX ADMIN — INSULIN LISPRO 2 UNITS: 100 INJECTION, SOLUTION INTRAVENOUS; SUBCUTANEOUS at 13:39

## 2022-01-01 RX ADMIN — SODIUM CHLORIDE 100 ML/HR: 9 INJECTION, SOLUTION INTRAVENOUS at 06:21

## 2022-01-01 RX ADMIN — HYDROMORPHONE HYDROCHLORIDE 0.5 MG: 1 INJECTION, SOLUTION INTRAMUSCULAR; INTRAVENOUS; SUBCUTANEOUS at 17:28

## 2022-01-01 RX ADMIN — IPRATROPIUM BROMIDE AND ALBUTEROL SULFATE 3 ML: .5; 3 SOLUTION RESPIRATORY (INHALATION) at 11:03

## 2022-01-01 RX ADMIN — FUROSEMIDE 40 MG: 40 TABLET ORAL at 15:25

## 2022-01-01 RX ADMIN — BACITRACIN 1 APPLICATION: 500 OINTMENT TOPICAL at 20:01

## 2022-01-01 RX ADMIN — INSULIN LISPRO 4 UNITS: 100 INJECTION, SOLUTION INTRAVENOUS; SUBCUTANEOUS at 11:58

## 2022-01-01 RX ADMIN — MIRTAZAPINE 30 MG: 15 TABLET, FILM COATED ORAL at 21:25

## 2022-01-01 RX ADMIN — OXYCODONE 10 MG: 5 TABLET ORAL at 21:02

## 2022-01-01 RX ADMIN — FUROSEMIDE 20 MG: 10 INJECTION, SOLUTION INTRAMUSCULAR; INTRAVENOUS at 09:05

## 2022-01-01 RX ADMIN — AMPICILLIN SODIUM AND SULBACTAM SODIUM 1.5 G: 1; .5 INJECTION, POWDER, FOR SOLUTION INTRAMUSCULAR; INTRAVENOUS at 11:20

## 2022-01-01 RX ADMIN — HYDROMORPHONE HYDROCHLORIDE 0.5 MG: 1 INJECTION, SOLUTION INTRAMUSCULAR; INTRAVENOUS; SUBCUTANEOUS at 21:54

## 2022-01-01 RX ADMIN — INSULIN LISPRO 24 UNITS: 100 INJECTION, SOLUTION INTRAVENOUS; SUBCUTANEOUS at 11:40

## 2022-01-01 RX ADMIN — BENZONATATE 100 MG: 100 CAPSULE ORAL at 09:52

## 2022-01-01 RX ADMIN — OXYCODONE 10 MG: 5 TABLET ORAL at 00:59

## 2022-01-01 RX ADMIN — FAMOTIDINE 20 MG: 20 TABLET ORAL at 17:30

## 2022-01-01 RX ADMIN — ENOXAPARIN SODIUM 120 MG: 150 INJECTION SUBCUTANEOUS at 12:17

## 2022-01-01 RX ADMIN — HYDROMORPHONE HYDROCHLORIDE 0.5 MG: 1 INJECTION, SOLUTION INTRAMUSCULAR; INTRAVENOUS; SUBCUTANEOUS at 08:35

## 2022-01-01 RX ADMIN — TRAZODONE HYDROCHLORIDE 100 MG: 100 TABLET ORAL at 21:11

## 2022-01-01 RX ADMIN — NASAL DECONGESTANT 2 SPRAY: 0.05 SPRAY NASAL at 20:28

## 2022-01-01 RX ADMIN — PANTOPRAZOLE SODIUM 40 MG: 40 INJECTION, POWDER, FOR SOLUTION INTRAVENOUS at 10:34

## 2022-01-01 RX ADMIN — SUCRALFATE 1 G: 1 TABLET ORAL at 12:57

## 2022-01-01 RX ADMIN — PREGABALIN 150 MG: 75 CAPSULE ORAL at 08:21

## 2022-01-01 RX ADMIN — METOPROLOL TARTRATE 50 MG: 50 TABLET, FILM COATED ORAL at 07:55

## 2022-01-01 RX ADMIN — BUDESONIDE 0.5 MG: 0.5 INHALANT RESPIRATORY (INHALATION) at 19:03

## 2022-01-01 RX ADMIN — Medication 3 ML: at 09:35

## 2022-01-01 RX ADMIN — SODIUM CHLORIDE 250 MG: 9 INJECTION, SOLUTION INTRAVENOUS at 14:41

## 2022-01-01 RX ADMIN — Medication 3 ML: at 20:54

## 2022-01-01 RX ADMIN — HYDROMORPHONE HYDROCHLORIDE 0.5 MG: 1 INJECTION, SOLUTION INTRAMUSCULAR; INTRAVENOUS; SUBCUTANEOUS at 05:12

## 2022-01-01 RX ADMIN — SUCRALFATE 1 G: 1 TABLET ORAL at 21:16

## 2022-01-01 RX ADMIN — HYDROMORPHONE HYDROCHLORIDE 1 MG: 1 INJECTION, SOLUTION INTRAMUSCULAR; INTRAVENOUS; SUBCUTANEOUS at 05:31

## 2022-01-01 RX ADMIN — INSULIN GLARGINE 10 UNITS: 100 INJECTION, SOLUTION SUBCUTANEOUS at 13:20

## 2022-01-01 RX ADMIN — INSULIN HUMAN 8 UNITS: 100 INJECTION, SOLUTION PARENTERAL at 08:25

## 2022-01-01 RX ADMIN — Medication 10 ML: at 20:54

## 2022-01-01 RX ADMIN — GLIPIZIDE 5 MG: 5 TABLET ORAL at 18:04

## 2022-01-01 RX ADMIN — HYDROMORPHONE HYDROCHLORIDE 1 MG: 1 INJECTION, SOLUTION INTRAMUSCULAR; INTRAVENOUS; SUBCUTANEOUS at 12:58

## 2022-01-01 RX ADMIN — BUDESONIDE 0.5 MG: 0.5 INHALANT RESPIRATORY (INHALATION) at 06:34

## 2022-01-01 RX ADMIN — INSULIN LISPRO 7 UNITS: 100 INJECTION, SOLUTION INTRAVENOUS; SUBCUTANEOUS at 08:32

## 2022-01-01 RX ADMIN — IPRATROPIUM BROMIDE AND ALBUTEROL SULFATE 3 ML: .5; 3 SOLUTION RESPIRATORY (INHALATION) at 12:14

## 2022-01-01 RX ADMIN — APIXABAN 5 MG: 5 TABLET, FILM COATED ORAL at 09:44

## 2022-01-01 RX ADMIN — HYDROMORPHONE HYDROCHLORIDE 0.25 MG: 1 INJECTION, SOLUTION INTRAMUSCULAR; INTRAVENOUS; SUBCUTANEOUS at 23:29

## 2022-01-01 RX ADMIN — MIRTAZAPINE 30 MG: 15 TABLET, FILM COATED ORAL at 21:15

## 2022-01-01 RX ADMIN — Medication 3 MG: at 08:50

## 2022-01-01 RX ADMIN — HYDROMORPHONE HYDROCHLORIDE 1 MG: 1 INJECTION, SOLUTION INTRAMUSCULAR; INTRAVENOUS; SUBCUTANEOUS at 20:57

## 2022-01-01 RX ADMIN — INSULIN HUMAN 8 UNITS: 100 INJECTION, SOLUTION PARENTERAL at 12:35

## 2022-01-01 RX ADMIN — POLYETHYLENE GLYCOL 3350 17 G: 17 POWDER, FOR SOLUTION ORAL at 08:17

## 2022-01-01 RX ADMIN — FAMOTIDINE 20 MG: 20 TABLET ORAL at 16:54

## 2022-01-01 RX ADMIN — GUAIFENESIN 600 MG: 600 TABLET, EXTENDED RELEASE ORAL at 20:48

## 2022-01-01 RX ADMIN — TRAZODONE HYDROCHLORIDE 100 MG: 100 TABLET ORAL at 20:25

## 2022-01-01 RX ADMIN — OXYCODONE 10 MG: 5 TABLET ORAL at 02:22

## 2022-01-01 RX ADMIN — IPRATROPIUM BROMIDE AND ALBUTEROL SULFATE 3 ML: .5; 3 SOLUTION RESPIRATORY (INHALATION) at 18:24

## 2022-01-01 RX ADMIN — FAMOTIDINE 20 MG: 20 TABLET ORAL at 07:35

## 2022-01-01 RX ADMIN — HYDROMORPHONE HYDROCHLORIDE 1 MG: 1 INJECTION, SOLUTION INTRAMUSCULAR; INTRAVENOUS; SUBCUTANEOUS at 00:57

## 2022-01-01 RX ADMIN — FAMOTIDINE 20 MG: 20 TABLET, FILM COATED ORAL at 17:28

## 2022-01-01 RX ADMIN — GLIPIZIDE 5 MG: 5 TABLET ORAL at 18:36

## 2022-01-01 RX ADMIN — METOPROLOL TARTRATE 50 MG: 50 TABLET, FILM COATED ORAL at 17:14

## 2022-01-01 RX ADMIN — FAMOTIDINE 20 MG: 20 TABLET ORAL at 17:05

## 2022-01-01 RX ADMIN — BUDESONIDE 0.5 MG: 0.5 INHALANT RESPIRATORY (INHALATION) at 18:20

## 2022-01-01 RX ADMIN — APIXABAN 5 MG: 5 TABLET, FILM COATED ORAL at 21:29

## 2022-01-01 RX ADMIN — METOPROLOL TARTRATE 25 MG: 25 TABLET, FILM COATED ORAL at 21:53

## 2022-01-01 RX ADMIN — HYDROMORPHONE HYDROCHLORIDE 0.5 MG: 1 INJECTION, SOLUTION INTRAMUSCULAR; INTRAVENOUS; SUBCUTANEOUS at 12:15

## 2022-01-01 RX ADMIN — IPRATROPIUM BROMIDE AND ALBUTEROL SULFATE 3 ML: .5; 3 SOLUTION RESPIRATORY (INHALATION) at 10:44

## 2022-01-01 RX ADMIN — HYDROMORPHONE HYDROCHLORIDE 0.25 MG: 1 INJECTION, SOLUTION INTRAMUSCULAR; INTRAVENOUS; SUBCUTANEOUS at 11:06

## 2022-01-01 RX ADMIN — IPRATROPIUM BROMIDE AND ALBUTEROL SULFATE 3 ML: .5; 3 SOLUTION RESPIRATORY (INHALATION) at 21:50

## 2022-01-01 RX ADMIN — GUAIFENESIN 600 MG: 600 TABLET, EXTENDED RELEASE ORAL at 20:50

## 2022-01-01 RX ADMIN — TRAZODONE HYDROCHLORIDE 100 MG: 100 TABLET ORAL at 21:21

## 2022-01-01 RX ADMIN — ATORVASTATIN CALCIUM 40 MG: 40 TABLET, FILM COATED ORAL at 06:25

## 2022-01-01 RX ADMIN — HYDROMORPHONE HYDROCHLORIDE 0.25 MG: 1 INJECTION, SOLUTION INTRAMUSCULAR; INTRAVENOUS; SUBCUTANEOUS at 17:14

## 2022-01-01 RX ADMIN — HYDROMORPHONE HYDROCHLORIDE 1 MG: 1 INJECTION, SOLUTION INTRAMUSCULAR; INTRAVENOUS; SUBCUTANEOUS at 09:12

## 2022-01-01 RX ADMIN — HYDROMORPHONE HYDROCHLORIDE 0.25 MG: 1 INJECTION, SOLUTION INTRAMUSCULAR; INTRAVENOUS; SUBCUTANEOUS at 18:35

## 2022-01-01 RX ADMIN — METOPROLOL TARTRATE 25 MG: 25 TABLET, FILM COATED ORAL at 08:10

## 2022-01-01 RX ADMIN — APIXABAN 5 MG: 5 TABLET, FILM COATED ORAL at 21:58

## 2022-01-01 RX ADMIN — Medication 10 ML: at 22:03

## 2022-01-01 RX ADMIN — HYDROMORPHONE HYDROCHLORIDE 0.5 MG: 1 INJECTION, SOLUTION INTRAMUSCULAR; INTRAVENOUS; SUBCUTANEOUS at 06:46

## 2022-01-01 RX ADMIN — Medication 3 ML: at 08:06

## 2022-01-01 RX ADMIN — RANOLAZINE 500 MG: 500 TABLET, FILM COATED, EXTENDED RELEASE ORAL at 21:21

## 2022-01-01 RX ADMIN — VANCOMYCIN HYDROCHLORIDE 1500 MG: 10 INJECTION, POWDER, LYOPHILIZED, FOR SOLUTION INTRAVENOUS at 14:27

## 2022-01-01 RX ADMIN — TRAZODONE HYDROCHLORIDE 100 MG: 100 TABLET ORAL at 20:33

## 2022-01-01 RX ADMIN — PROPOFOL 50 MG: 10 INJECTION, EMULSION INTRAVENOUS at 08:37

## 2022-01-01 RX ADMIN — ATORVASTATIN CALCIUM 40 MG: 40 TABLET, FILM COATED ORAL at 20:26

## 2022-01-01 RX ADMIN — ATORVASTATIN CALCIUM 40 MG: 40 TABLET, FILM COATED ORAL at 06:03

## 2022-01-01 RX ADMIN — APIXABAN 5 MG: 5 TABLET, FILM COATED ORAL at 07:44

## 2022-01-01 RX ADMIN — INSULIN GLARGINE 10 UNITS: 100 INJECTION, SOLUTION SUBCUTANEOUS at 08:32

## 2022-01-01 RX ADMIN — BACITRACIN 1 APPLICATION: 500 OINTMENT TOPICAL at 20:13

## 2022-01-01 RX ADMIN — METHYLPREDNISOLONE SODIUM SUCCINATE 40 MG: 40 INJECTION, POWDER, FOR SOLUTION INTRAMUSCULAR; INTRAVENOUS at 05:31

## 2022-01-01 RX ADMIN — ONDANSETRON 4 MG: 2 INJECTION INTRAMUSCULAR; INTRAVENOUS at 19:12

## 2022-01-01 RX ADMIN — ONDANSETRON 4 MG: 2 INJECTION INTRAMUSCULAR; INTRAVENOUS at 14:48

## 2022-01-01 RX ADMIN — INSULIN LISPRO 7 UNITS: 100 INJECTION, SOLUTION INTRAVENOUS; SUBCUTANEOUS at 11:23

## 2022-01-01 RX ADMIN — METHYLPREDNISOLONE SODIUM SUCCINATE 20 MG: 40 INJECTION, POWDER, FOR SOLUTION INTRAMUSCULAR; INTRAVENOUS at 09:43

## 2022-01-01 RX ADMIN — ROPINIROLE HYDROCHLORIDE 0.5 MG: 0.25 TABLET, FILM COATED ORAL at 20:08

## 2022-01-01 RX ADMIN — LISINOPRIL 2.5 MG: 5 TABLET ORAL at 08:49

## 2022-01-01 RX ADMIN — OXYCODONE 10 MG: 5 TABLET ORAL at 11:36

## 2022-01-01 RX ADMIN — OXYCODONE 10 MG: 5 TABLET ORAL at 11:14

## 2022-01-01 RX ADMIN — RANOLAZINE 500 MG: 500 TABLET, FILM COATED, EXTENDED RELEASE ORAL at 08:49

## 2022-01-01 RX ADMIN — TRAZODONE HYDROCHLORIDE 100 MG: 100 TABLET ORAL at 20:08

## 2022-01-01 RX ADMIN — INSULIN LISPRO 10 UNITS: 100 INJECTION, SOLUTION INTRAVENOUS; SUBCUTANEOUS at 16:44

## 2022-01-01 RX ADMIN — BUDESONIDE AND FORMOTEROL FUMARATE DIHYDRATE 2 PUFF: 80; 4.5 AEROSOL RESPIRATORY (INHALATION) at 18:45

## 2022-01-01 RX ADMIN — FAMOTIDINE 20 MG: 20 TABLET, FILM COATED ORAL at 08:46

## 2022-01-01 RX ADMIN — ATORVASTATIN CALCIUM 40 MG: 40 TABLET, FILM COATED ORAL at 04:58

## 2022-01-01 RX ADMIN — BUDESONIDE 0.5 MG: 0.5 INHALANT RESPIRATORY (INHALATION) at 07:35

## 2022-01-01 RX ADMIN — BUDESONIDE 0.5 MG: 0.5 INHALANT RESPIRATORY (INHALATION) at 10:40

## 2022-01-01 RX ADMIN — PREGABALIN 150 MG: 75 CAPSULE ORAL at 11:56

## 2022-01-01 RX ADMIN — SUCRALFATE 1 G: 1 TABLET ORAL at 11:06

## 2022-01-01 RX ADMIN — BUDESONIDE 0.5 MG: 0.5 INHALANT RESPIRATORY (INHALATION) at 18:22

## 2022-01-01 RX ADMIN — TRAZODONE HYDROCHLORIDE 100 MG: 100 TABLET ORAL at 21:16

## 2022-01-01 RX ADMIN — INSULIN LISPRO 8 UNITS: 100 INJECTION, SOLUTION INTRAVENOUS; SUBCUTANEOUS at 11:58

## 2022-01-01 RX ADMIN — FAMOTIDINE 20 MG: 10 INJECTION INTRAVENOUS at 20:08

## 2022-01-01 RX ADMIN — ISOSORBIDE MONONITRATE 120 MG: 30 TABLET, EXTENDED RELEASE ORAL at 10:13

## 2022-01-01 RX ADMIN — INSULIN GLARGINE 10 UNITS: 100 INJECTION, SOLUTION SUBCUTANEOUS at 21:16

## 2022-01-01 RX ADMIN — HYDROMORPHONE HYDROCHLORIDE 0.5 MG: 1 INJECTION, SOLUTION INTRAMUSCULAR; INTRAVENOUS; SUBCUTANEOUS at 21:57

## 2022-01-01 RX ADMIN — INFLUENZA A VIRUS A/VICTORIA/2570/2019 IVR-215 (H1N1) ANTIGEN (FORMALDEHYDE INACTIVATED), INFLUENZA A VIRUS A/DARWIN/9/2021 SAN-010 (H3N2) ANTIGEN (FORMALDEHYDE INACTIVATED), INFLUENZA B VIRUS B/PHUKET/3073/2013 ANTIGEN (FORMALDEHYDE INACTIVATED), AND INFLUENZA B VIRUS B/MICHIGAN/01/2021 ANTIGEN (FORMALDEHYDE INACTIVATED) 0.7 ML: 60; 60; 60; 60 INJECTION, SUSPENSION INTRAMUSCULAR at 11:29

## 2022-01-01 RX ADMIN — RANOLAZINE 500 MG: 500 TABLET, FILM COATED, EXTENDED RELEASE ORAL at 11:58

## 2022-01-01 RX ADMIN — Medication 10 ML: at 21:07

## 2022-01-01 RX ADMIN — SODIUM CHLORIDE, POTASSIUM CHLORIDE, SODIUM LACTATE AND CALCIUM CHLORIDE 125 ML/HR: 600; 310; 30; 20 INJECTION, SOLUTION INTRAVENOUS at 07:36

## 2022-01-01 RX ADMIN — HYDROMORPHONE HYDROCHLORIDE 0.5 MG: 1 INJECTION, SOLUTION INTRAMUSCULAR; INTRAVENOUS; SUBCUTANEOUS at 16:11

## 2022-01-01 RX ADMIN — BUDESONIDE AND FORMOTEROL FUMARATE DIHYDRATE 2 PUFF: 80; 4.5 AEROSOL RESPIRATORY (INHALATION) at 18:20

## 2022-01-01 RX ADMIN — INSULIN HUMAN 8 UNITS: 100 INJECTION, SOLUTION PARENTERAL at 08:06

## 2022-01-01 RX ADMIN — AMOXICILLIN AND CLAVULANATE POTASSIUM 1 TABLET: 875; 125 TABLET, FILM COATED ORAL at 21:08

## 2022-01-01 RX ADMIN — INSULIN LISPRO 5 UNITS: 100 INJECTION, SOLUTION INTRAVENOUS; SUBCUTANEOUS at 17:06

## 2022-01-01 RX ADMIN — IPRATROPIUM BROMIDE AND ALBUTEROL SULFATE 3 ML: .5; 3 SOLUTION RESPIRATORY (INHALATION) at 05:33

## 2022-01-01 RX ADMIN — Medication 10 ML: at 14:54

## 2022-01-01 RX ADMIN — OXYCODONE 10 MG: 5 TABLET ORAL at 16:31

## 2022-01-01 RX ADMIN — PREGABALIN 150 MG: 75 CAPSULE ORAL at 08:20

## 2022-01-01 RX ADMIN — APIXABAN 5 MG: 5 TABLET, FILM COATED ORAL at 08:20

## 2022-01-01 RX ADMIN — RANOLAZINE 500 MG: 500 TABLET, FILM COATED, EXTENDED RELEASE ORAL at 08:21

## 2022-01-01 RX ADMIN — PREDNISONE 40 MG: 20 TABLET ORAL at 10:13

## 2022-01-01 RX ADMIN — MIDAZOLAM 2 MG: 1 INJECTION INTRAMUSCULAR; INTRAVENOUS at 07:24

## 2022-01-01 RX ADMIN — IPRATROPIUM BROMIDE AND ALBUTEROL SULFATE 3 ML: 2.5; .5 SOLUTION RESPIRATORY (INHALATION) at 16:36

## 2022-01-01 RX ADMIN — INSULIN LISPRO 2 UNITS: 100 INJECTION, SOLUTION INTRAVENOUS; SUBCUTANEOUS at 07:33

## 2022-01-01 RX ADMIN — RANOLAZINE 500 MG: 500 TABLET, FILM COATED, EXTENDED RELEASE ORAL at 20:40

## 2022-01-01 RX ADMIN — HYDROMORPHONE HYDROCHLORIDE 1 MG: 1 INJECTION, SOLUTION INTRAMUSCULAR; INTRAVENOUS; SUBCUTANEOUS at 16:31

## 2022-01-01 RX ADMIN — BUDESONIDE 0.5 MG: 0.5 INHALANT RESPIRATORY (INHALATION) at 09:05

## 2022-01-01 RX ADMIN — INSULIN LISPRO 12 UNITS: 100 INJECTION, SOLUTION INTRAVENOUS; SUBCUTANEOUS at 18:16

## 2022-01-01 RX ADMIN — BACITRACIN 1 APPLICATION: 500 OINTMENT TOPICAL at 08:13

## 2022-01-01 RX ADMIN — SUCRALFATE 1 G: 1 TABLET ORAL at 08:17

## 2022-01-01 RX ADMIN — METOPROLOL TARTRATE 25 MG: 25 TABLET, FILM COATED ORAL at 19:52

## 2022-01-01 RX ADMIN — ALBUTEROL SULFATE 2.5 MG: 2.5 SOLUTION RESPIRATORY (INHALATION) at 04:25

## 2022-01-01 RX ADMIN — INSULIN LISPRO 2 UNITS: 100 INJECTION, SOLUTION INTRAVENOUS; SUBCUTANEOUS at 10:08

## 2022-01-01 RX ADMIN — HYDROMORPHONE HYDROCHLORIDE 0.5 MG: 1 INJECTION, SOLUTION INTRAMUSCULAR; INTRAVENOUS; SUBCUTANEOUS at 05:52

## 2022-01-01 RX ADMIN — HYDROMORPHONE HYDROCHLORIDE 0.5 MG: 1 INJECTION, SOLUTION INTRAMUSCULAR; INTRAVENOUS; SUBCUTANEOUS at 14:57

## 2022-01-01 RX ADMIN — CEFTRIAXONE 2 G: 2 INJECTION, POWDER, FOR SOLUTION INTRAMUSCULAR; INTRAVENOUS at 17:12

## 2022-01-01 RX ADMIN — PROPOFOL 100 MG: 10 INJECTION, EMULSION INTRAVENOUS at 08:00

## 2022-01-01 RX ADMIN — ENOXAPARIN SODIUM 120 MG: 150 INJECTION SUBCUTANEOUS at 00:06

## 2022-01-01 RX ADMIN — Medication 3 ML: at 09:07

## 2022-01-01 RX ADMIN — MIRTAZAPINE 30 MG: 15 TABLET, FILM COATED ORAL at 19:55

## 2022-01-01 RX ADMIN — CEFUROXIME AXETIL 500 MG: 500 TABLET, FILM COATED ORAL at 02:26

## 2022-01-01 RX ADMIN — RANOLAZINE 500 MG: 500 TABLET, FILM COATED, EXTENDED RELEASE ORAL at 08:44

## 2022-01-01 RX ADMIN — METOPROLOL TARTRATE 50 MG: 50 TABLET, FILM COATED ORAL at 17:50

## 2022-01-01 RX ADMIN — GUAIFENESIN 600 MG: 600 TABLET, EXTENDED RELEASE ORAL at 09:31

## 2022-01-01 RX ADMIN — MIRTAZAPINE 30 MG: 15 TABLET, FILM COATED ORAL at 20:19

## 2022-01-01 RX ADMIN — RANOLAZINE 500 MG: 500 TABLET, FILM COATED, EXTENDED RELEASE ORAL at 21:23

## 2022-01-01 RX ADMIN — DOCUSATE SODIUM 200 MG: 100 CAPSULE, LIQUID FILLED ORAL at 22:09

## 2022-01-01 RX ADMIN — METOPROLOL TARTRATE 25 MG: 25 TABLET, FILM COATED ORAL at 10:51

## 2022-01-01 RX ADMIN — SUGAMMADEX 200 MG: 100 INJECTION, SOLUTION INTRAVENOUS at 09:03

## 2022-01-01 RX ADMIN — HYDROMORPHONE HYDROCHLORIDE 0.5 MG: 1 INJECTION, SOLUTION INTRAMUSCULAR; INTRAVENOUS; SUBCUTANEOUS at 21:22

## 2022-01-01 RX ADMIN — VANCOMYCIN HYDROCHLORIDE 1750 MG: 10 INJECTION, POWDER, LYOPHILIZED, FOR SOLUTION INTRAVENOUS at 06:57

## 2022-01-01 RX ADMIN — HYDROMORPHONE HYDROCHLORIDE 0.25 MG: 1 INJECTION, SOLUTION INTRAMUSCULAR; INTRAVENOUS; SUBCUTANEOUS at 06:05

## 2022-01-01 RX ADMIN — HYDROMORPHONE HYDROCHLORIDE 0.5 MG: 1 INJECTION, SOLUTION INTRAMUSCULAR; INTRAVENOUS; SUBCUTANEOUS at 16:58

## 2022-01-01 RX ADMIN — HYDROMORPHONE HYDROCHLORIDE 0.5 MG: 1 INJECTION, SOLUTION INTRAMUSCULAR; INTRAVENOUS; SUBCUTANEOUS at 21:04

## 2022-01-01 RX ADMIN — ISOSORBIDE MONONITRATE 120 MG: 30 TABLET, EXTENDED RELEASE ORAL at 08:21

## 2022-01-01 RX ADMIN — BUDESONIDE 0.5 MG: 0.5 INHALANT RESPIRATORY (INHALATION) at 07:14

## 2022-01-01 RX ADMIN — PREDNISONE 40 MG: 20 TABLET ORAL at 09:06

## 2022-01-01 RX ADMIN — HYDROMORPHONE HYDROCHLORIDE 0.25 MG: 1 INJECTION, SOLUTION INTRAMUSCULAR; INTRAVENOUS; SUBCUTANEOUS at 01:46

## 2022-01-01 RX ADMIN — ONDANSETRON 4 MG: 2 INJECTION INTRAMUSCULAR; INTRAVENOUS at 06:04

## 2022-01-01 RX ADMIN — PANTOPRAZOLE SODIUM 40 MG: 40 INJECTION, POWDER, FOR SOLUTION INTRAVENOUS at 06:21

## 2022-01-01 RX ADMIN — SUCRALFATE 1 G: 1 TABLET ORAL at 11:23

## 2022-01-01 RX ADMIN — NITROGLYCERIN 0.4 MG: 0.4 TABLET SUBLINGUAL at 03:49

## 2022-01-01 RX ADMIN — RANOLAZINE 500 MG: 500 TABLET, FILM COATED, EXTENDED RELEASE ORAL at 20:48

## 2022-01-01 RX ADMIN — RANOLAZINE 1000 MG: 500 TABLET, FILM COATED, EXTENDED RELEASE ORAL at 21:15

## 2022-01-01 RX ADMIN — OXYCODONE 10 MG: 5 TABLET ORAL at 12:31

## 2022-01-01 RX ADMIN — ATORVASTATIN CALCIUM 40 MG: 40 TABLET, FILM COATED ORAL at 20:08

## 2022-01-01 RX ADMIN — METOCLOPRAMIDE 5 MG: 5 TABLET ORAL at 11:37

## 2022-01-01 RX ADMIN — METOCLOPRAMIDE 5 MG: 5 TABLET ORAL at 12:00

## 2022-01-01 RX ADMIN — FAMOTIDINE 20 MG: 20 TABLET ORAL at 17:25

## 2022-01-01 RX ADMIN — Medication 100 MCG: at 14:35

## 2022-01-01 RX ADMIN — GUAIFENESIN 600 MG: 600 TABLET, EXTENDED RELEASE ORAL at 21:27

## 2022-01-01 RX ADMIN — HYDROMORPHONE HYDROCHLORIDE 0.5 MG: 1 INJECTION, SOLUTION INTRAMUSCULAR; INTRAVENOUS; SUBCUTANEOUS at 00:40

## 2022-01-01 RX ADMIN — GUAIFENESIN 600 MG: 600 TABLET, EXTENDED RELEASE ORAL at 08:10

## 2022-01-01 RX ADMIN — SUCRALFATE 1 G: 1 TABLET ORAL at 11:13

## 2022-01-01 RX ADMIN — GLIPIZIDE 5 MG: 5 TABLET ORAL at 08:47

## 2022-01-01 RX ADMIN — ACETAMINOPHEN 650 MG: 325 TABLET, FILM COATED ORAL at 09:53

## 2022-01-01 RX ADMIN — OXYCODONE 10 MG: 5 TABLET ORAL at 10:33

## 2022-01-01 RX ADMIN — BUDESONIDE 0.5 MG: 0.5 INHALANT RESPIRATORY (INHALATION) at 18:51

## 2022-01-01 RX ADMIN — Medication 10 ML: at 21:56

## 2022-01-01 RX ADMIN — METHYLPREDNISOLONE SODIUM SUCCINATE 60 MG: 125 INJECTION, POWDER, FOR SOLUTION INTRAMUSCULAR; INTRAVENOUS at 06:24

## 2022-01-01 RX ADMIN — METOLAZONE 5 MG: 5 TABLET ORAL at 08:53

## 2022-01-01 RX ADMIN — INSULIN LISPRO 5 UNITS: 100 INJECTION, SOLUTION INTRAVENOUS; SUBCUTANEOUS at 11:06

## 2022-01-01 RX ADMIN — Medication 10 ML: at 08:48

## 2022-01-01 RX ADMIN — BACITRACIN 1 APPLICATION: 500 OINTMENT TOPICAL at 21:22

## 2022-01-01 RX ADMIN — RANOLAZINE 500 MG: 500 TABLET, FILM COATED, EXTENDED RELEASE ORAL at 08:08

## 2022-01-01 RX ADMIN — ROPINIROLE HYDROCHLORIDE 1 MG: 1 TABLET, FILM COATED ORAL at 01:33

## 2022-01-01 RX ADMIN — HYDROMORPHONE HYDROCHLORIDE 0.25 MG: 1 INJECTION, SOLUTION INTRAMUSCULAR; INTRAVENOUS; SUBCUTANEOUS at 15:01

## 2022-01-01 RX ADMIN — Medication 3 ML: at 08:26

## 2022-01-01 RX ADMIN — AMPICILLIN SODIUM AND SULBACTAM SODIUM 1.5 G: 1; .5 INJECTION, POWDER, FOR SOLUTION INTRAMUSCULAR; INTRAVENOUS at 11:55

## 2022-01-01 RX ADMIN — GUAIFENESIN 600 MG: 600 TABLET, EXTENDED RELEASE ORAL at 08:43

## 2022-01-01 RX ADMIN — FAMOTIDINE 20 MG: 20 TABLET, FILM COATED ORAL at 10:58

## 2022-01-01 RX ADMIN — DOCUSATE SODIUM 100 MG: 100 CAPSULE, LIQUID FILLED ORAL at 21:06

## 2022-01-01 RX ADMIN — PREDNISONE 10 MG: 10 TABLET ORAL at 08:02

## 2022-01-01 RX ADMIN — ROPINIROLE HYDROCHLORIDE 1 MG: 1 TABLET, FILM COATED ORAL at 20:33

## 2022-01-01 RX ADMIN — ACETAMINOPHEN 650 MG: 325 TABLET ORAL at 19:19

## 2022-01-01 RX ADMIN — HYDROMORPHONE HYDROCHLORIDE 0.5 MG: 1 INJECTION, SOLUTION INTRAMUSCULAR; INTRAVENOUS; SUBCUTANEOUS at 15:16

## 2022-01-01 RX ADMIN — DOCUSATE SODIUM 200 MG: 100 CAPSULE, LIQUID FILLED ORAL at 20:44

## 2022-01-01 RX ADMIN — HYDROMORPHONE HYDROCHLORIDE 0.5 MG: 1 INJECTION, SOLUTION INTRAMUSCULAR; INTRAVENOUS; SUBCUTANEOUS at 06:33

## 2022-01-01 RX ADMIN — HYDROMORPHONE HYDROCHLORIDE 1 MG: 1 INJECTION, SOLUTION INTRAMUSCULAR; INTRAVENOUS; SUBCUTANEOUS at 20:55

## 2022-01-01 RX ADMIN — DOCUSATE SODIUM 200 MG: 100 CAPSULE, LIQUID FILLED ORAL at 20:40

## 2022-01-01 RX ADMIN — SUCRALFATE 1 G: 1 TABLET ORAL at 20:39

## 2022-01-01 RX ADMIN — IPRATROPIUM BROMIDE AND ALBUTEROL SULFATE 3 ML: .5; 3 SOLUTION RESPIRATORY (INHALATION) at 19:15

## 2022-01-01 RX ADMIN — ISOSORBIDE MONONITRATE 120 MG: 60 TABLET, EXTENDED RELEASE ORAL at 07:54

## 2022-01-01 RX ADMIN — METOPROLOL TARTRATE 50 MG: 50 TABLET, FILM COATED ORAL at 17:07

## 2022-01-01 RX ADMIN — ATORVASTATIN CALCIUM 40 MG: 40 TABLET, FILM COATED ORAL at 08:42

## 2022-01-01 RX ADMIN — SUCRALFATE 1 G: 1 TABLET ORAL at 17:07

## 2022-01-01 RX ADMIN — OXYCODONE 10 MG: 5 TABLET ORAL at 00:44

## 2022-01-01 RX ADMIN — METHYLPREDNISOLONE SODIUM SUCCINATE 40 MG: 40 INJECTION, POWDER, FOR SOLUTION INTRAMUSCULAR; INTRAVENOUS at 17:23

## 2022-01-01 RX ADMIN — IPRATROPIUM BROMIDE AND ALBUTEROL SULFATE 3 ML: .5; 3 SOLUTION RESPIRATORY (INHALATION) at 06:43

## 2022-01-01 RX ADMIN — ONDANSETRON 4 MG: 2 INJECTION INTRAMUSCULAR; INTRAVENOUS at 06:13

## 2022-01-01 RX ADMIN — HYDROMORPHONE HYDROCHLORIDE 0.5 MG: 1 INJECTION, SOLUTION INTRAMUSCULAR; INTRAVENOUS; SUBCUTANEOUS at 14:34

## 2022-01-01 RX ADMIN — ARFORMOTEROL TARTRATE 15 MCG: 15 SOLUTION RESPIRATORY (INHALATION) at 19:15

## 2022-01-01 RX ADMIN — CALCIUM GLUCONATE 2 G: 20 INJECTION, SOLUTION INTRAVENOUS at 23:43

## 2022-01-01 RX ADMIN — HYDROMORPHONE HYDROCHLORIDE 0.25 MG: 1 INJECTION, SOLUTION INTRAMUSCULAR; INTRAVENOUS; SUBCUTANEOUS at 19:01

## 2022-01-01 RX ADMIN — ISOSORBIDE MONONITRATE 120 MG: 60 TABLET, EXTENDED RELEASE ORAL at 08:10

## 2022-01-01 RX ADMIN — PROPOFOL 80 MG: 10 INJECTION, EMULSION INTRAVENOUS at 14:40

## 2022-01-01 RX ADMIN — SODIUM CHLORIDE 75 ML/HR: 9 INJECTION, SOLUTION INTRAVENOUS at 01:21

## 2022-01-01 RX ADMIN — SUCRALFATE 1 G: 1 TABLET ORAL at 07:34

## 2022-01-01 RX ADMIN — PREDNISONE 40 MG: 20 TABLET ORAL at 13:38

## 2022-01-01 RX ADMIN — Medication 10 ML: at 20:37

## 2022-01-01 RX ADMIN — METOPROLOL TARTRATE 25 MG: 25 TABLET, FILM COATED ORAL at 08:21

## 2022-01-01 RX ADMIN — INSULIN HUMAN 8 UNITS: 100 INJECTION, SOLUTION PARENTERAL at 18:17

## 2022-01-01 RX ADMIN — IPRATROPIUM BROMIDE AND ALBUTEROL SULFATE 3 ML: .5; 3 SOLUTION RESPIRATORY (INHALATION) at 02:41

## 2022-01-01 RX ADMIN — OXYCODONE 7.5 MG: 5 TABLET ORAL at 03:43

## 2022-01-01 RX ADMIN — MIRTAZAPINE 30 MG: 15 TABLET, FILM COATED ORAL at 20:35

## 2022-01-01 RX ADMIN — SUCRALFATE 1 G: 1 TABLET ORAL at 08:49

## 2022-01-01 RX ADMIN — SODIUM CHLORIDE 75 ML/HR: 9 INJECTION, SOLUTION INTRAVENOUS at 11:39

## 2022-01-01 RX ADMIN — GLIPIZIDE 5 MG: 5 TABLET ORAL at 07:07

## 2022-01-01 RX ADMIN — BENZONATATE 100 MG: 100 CAPSULE ORAL at 16:51

## 2022-01-01 RX ADMIN — IPRATROPIUM BROMIDE AND ALBUTEROL SULFATE 3 ML: .5; 3 SOLUTION RESPIRATORY (INHALATION) at 11:25

## 2022-01-01 RX ADMIN — CEFTRIAXONE 2 G: 2 INJECTION, POWDER, FOR SOLUTION INTRAMUSCULAR; INTRAVENOUS at 17:37

## 2022-01-01 RX ADMIN — VANCOMYCIN HYDROCHLORIDE 1750 MG: 10 INJECTION, POWDER, LYOPHILIZED, FOR SOLUTION INTRAVENOUS at 17:29

## 2022-01-01 RX ADMIN — HYDROMORPHONE HYDROCHLORIDE 0.5 MG: 1 INJECTION, SOLUTION INTRAMUSCULAR; INTRAVENOUS; SUBCUTANEOUS at 22:11

## 2022-01-01 RX ADMIN — ISOSORBIDE MONONITRATE 120 MG: 60 TABLET, EXTENDED RELEASE ORAL at 08:53

## 2022-01-01 RX ADMIN — GUAIFENESIN 600 MG: 600 TABLET, EXTENDED RELEASE ORAL at 21:23

## 2022-01-01 RX ADMIN — CEFTRIAXONE 2 G: 2 INJECTION, POWDER, FOR SOLUTION INTRAMUSCULAR; INTRAVENOUS at 16:58

## 2022-01-01 RX ADMIN — DOXYCYCLINE 100 MG: 100 INJECTION, POWDER, LYOPHILIZED, FOR SOLUTION INTRAVENOUS at 06:06

## 2022-01-01 RX ADMIN — MIRTAZAPINE 30 MG: 15 TABLET, FILM COATED ORAL at 21:58

## 2022-01-01 RX ADMIN — ISOSORBIDE MONONITRATE 120 MG: 30 TABLET, EXTENDED RELEASE ORAL at 08:11

## 2022-01-01 RX ADMIN — BUDESONIDE 0.5 MG: 0.5 INHALANT RESPIRATORY (INHALATION) at 20:20

## 2022-01-01 RX ADMIN — TRAZODONE HYDROCHLORIDE 100 MG: 100 TABLET ORAL at 20:51

## 2022-01-01 RX ADMIN — APIXABAN 5 MG: 5 TABLET, FILM COATED ORAL at 21:06

## 2022-01-01 RX ADMIN — PREGABALIN 150 MG: 75 CAPSULE ORAL at 08:46

## 2022-01-01 RX ADMIN — ATORVASTATIN CALCIUM 40 MG: 40 TABLET, FILM COATED ORAL at 07:51

## 2022-01-01 RX ADMIN — HYDROCODONE BITARTRATE AND ACETAMINOPHEN 1 TABLET: 7.5; 325 TABLET ORAL at 15:54

## 2022-01-01 RX ADMIN — ASPIRIN 81 MG: 81 TABLET, COATED ORAL at 10:13

## 2022-01-01 RX ADMIN — ATORVASTATIN CALCIUM 40 MG: 40 TABLET, FILM COATED ORAL at 10:09

## 2022-01-01 RX ADMIN — HYDROMORPHONE HYDROCHLORIDE 1 MG: 1 INJECTION, SOLUTION INTRAMUSCULAR; INTRAVENOUS; SUBCUTANEOUS at 23:39

## 2022-01-01 RX ADMIN — ASPIRIN 81 MG: 81 TABLET, COATED ORAL at 09:44

## 2022-01-01 RX ADMIN — INSULIN LISPRO 24 UNITS: 100 INJECTION, SOLUTION INTRAVENOUS; SUBCUTANEOUS at 20:36

## 2022-01-01 RX ADMIN — HYDROMORPHONE HYDROCHLORIDE 0.5 MG: 1 INJECTION, SOLUTION INTRAMUSCULAR; INTRAVENOUS; SUBCUTANEOUS at 14:06

## 2022-01-01 RX ADMIN — SUCRALFATE 1 G: 1 TABLET ORAL at 06:33

## 2022-01-01 RX ADMIN — INSULIN LISPRO 5 UNITS: 100 INJECTION, SOLUTION INTRAVENOUS; SUBCUTANEOUS at 09:06

## 2022-01-01 RX ADMIN — ATORVASTATIN CALCIUM 40 MG: 40 TABLET, FILM COATED ORAL at 21:53

## 2022-01-01 RX ADMIN — BUDESONIDE 0.5 MG: 0.5 INHALANT RESPIRATORY (INHALATION) at 19:33

## 2022-01-01 RX ADMIN — METOLAZONE 5 MG: 5 TABLET ORAL at 08:44

## 2022-01-01 RX ADMIN — FAMOTIDINE 20 MG: 20 TABLET, FILM COATED ORAL at 08:18

## 2022-01-01 RX ADMIN — Medication 10 ML: at 09:37

## 2022-01-01 RX ADMIN — GUAIFENESIN 600 MG: 600 TABLET, EXTENDED RELEASE ORAL at 20:32

## 2022-01-01 RX ADMIN — FAMOTIDINE 20 MG: 20 TABLET, FILM COATED ORAL at 16:19

## 2022-01-01 RX ADMIN — INSULIN LISPRO 7 UNITS: 100 INJECTION, SOLUTION INTRAVENOUS; SUBCUTANEOUS at 09:01

## 2022-01-01 RX ADMIN — DOCUSATE SODIUM 100 MG: 100 CAPSULE, LIQUID FILLED ORAL at 08:17

## 2022-01-01 RX ADMIN — ATORVASTATIN CALCIUM 40 MG: 40 TABLET, FILM COATED ORAL at 21:21

## 2022-01-01 RX ADMIN — Medication 3 ML: at 21:22

## 2022-01-01 RX ADMIN — PREDNISONE 30 MG: 20 TABLET ORAL at 09:06

## 2022-01-01 RX ADMIN — HYDROMORPHONE HYDROCHLORIDE 1 MG: 1 INJECTION, SOLUTION INTRAMUSCULAR; INTRAVENOUS; SUBCUTANEOUS at 09:07

## 2022-01-01 RX ADMIN — METOPROLOL TARTRATE 25 MG: 25 TABLET, FILM COATED ORAL at 09:06

## 2022-01-01 RX ADMIN — HYDROMORPHONE HYDROCHLORIDE 0.5 MG: 1 INJECTION, SOLUTION INTRAMUSCULAR; INTRAVENOUS; SUBCUTANEOUS at 15:01

## 2022-01-01 RX ADMIN — LACTULOSE 10 G: 20 SOLUTION ORAL at 09:52

## 2022-01-01 RX ADMIN — HYDROMORPHONE HYDROCHLORIDE 0.5 MG: 1 INJECTION, SOLUTION INTRAMUSCULAR; INTRAVENOUS; SUBCUTANEOUS at 01:33

## 2022-01-01 RX ADMIN — SUCRALFATE 1 G: 1 TABLET ORAL at 11:25

## 2022-01-01 RX ADMIN — ASPIRIN 81 MG: 81 TABLET, COATED ORAL at 08:14

## 2022-01-01 RX ADMIN — HYDROMORPHONE HYDROCHLORIDE 0.25 MG: 1 INJECTION, SOLUTION INTRAMUSCULAR; INTRAVENOUS; SUBCUTANEOUS at 05:37

## 2022-01-01 RX ADMIN — DOCUSATE SODIUM 200 MG: 100 CAPSULE, LIQUID FILLED ORAL at 08:17

## 2022-01-01 RX ADMIN — RANOLAZINE 1000 MG: 500 TABLET, FILM COATED, EXTENDED RELEASE ORAL at 08:53

## 2022-01-01 RX ADMIN — PREGABALIN 150 MG: 75 CAPSULE ORAL at 08:53

## 2022-01-01 RX ADMIN — GLIPIZIDE 5 MG: 5 TABLET ORAL at 18:16

## 2022-01-01 RX ADMIN — HYDROMORPHONE HYDROCHLORIDE 0.5 MG: 1 INJECTION, SOLUTION INTRAMUSCULAR; INTRAVENOUS; SUBCUTANEOUS at 13:02

## 2022-01-01 RX ADMIN — RANOLAZINE 500 MG: 500 TABLET, FILM COATED, EXTENDED RELEASE ORAL at 21:02

## 2022-01-01 RX ADMIN — INSULIN HUMAN 8 UNITS: 100 INJECTION, SOLUTION PARENTERAL at 17:23

## 2022-01-01 RX ADMIN — HYDROMORPHONE HYDROCHLORIDE 0.5 MG: 1 INJECTION, SOLUTION INTRAMUSCULAR; INTRAVENOUS; SUBCUTANEOUS at 23:04

## 2022-01-01 RX ADMIN — Medication 3 ML: at 21:10

## 2022-01-01 RX ADMIN — FAMOTIDINE 20 MG: 10 INJECTION INTRAVENOUS at 21:22

## 2022-01-01 RX ADMIN — Medication 10 ML: at 21:20

## 2022-01-01 RX ADMIN — TRAZODONE HYDROCHLORIDE 100 MG: 100 TABLET ORAL at 20:50

## 2022-01-01 RX ADMIN — RANOLAZINE 500 MG: 500 TABLET, FILM COATED, EXTENDED RELEASE ORAL at 20:44

## 2022-01-01 RX ADMIN — TRAZODONE HYDROCHLORIDE 100 MG: 100 TABLET ORAL at 01:33

## 2022-01-01 RX ADMIN — PREGABALIN 150 MG: 75 CAPSULE ORAL at 21:11

## 2022-01-01 RX ADMIN — BENZONATATE 100 MG: 100 CAPSULE ORAL at 19:52

## 2022-01-01 RX ADMIN — RANOLAZINE 500 MG: 500 TABLET, FILM COATED, EXTENDED RELEASE ORAL at 01:00

## 2022-01-01 RX ADMIN — ASPIRIN 81 MG: 81 TABLET, COATED ORAL at 10:12

## 2022-01-01 RX ADMIN — AMPICILLIN SODIUM AND SULBACTAM SODIUM 1.5 G: 1; .5 INJECTION, POWDER, FOR SOLUTION INTRAMUSCULAR; INTRAVENOUS at 22:06

## 2022-01-01 RX ADMIN — PREGABALIN 150 MG: 75 CAPSULE ORAL at 08:09

## 2022-01-01 RX ADMIN — HYDROMORPHONE HYDROCHLORIDE 1 MG: 1 INJECTION, SOLUTION INTRAMUSCULAR; INTRAVENOUS; SUBCUTANEOUS at 13:39

## 2022-01-01 RX ADMIN — PREGABALIN 150 MG: 75 CAPSULE ORAL at 15:25

## 2022-01-01 RX ADMIN — HYDROMORPHONE HYDROCHLORIDE 0.25 MG: 1 INJECTION, SOLUTION INTRAMUSCULAR; INTRAVENOUS; SUBCUTANEOUS at 03:53

## 2022-01-01 RX ADMIN — OXYCODONE 10 MG: 5 TABLET ORAL at 05:15

## 2022-01-01 RX ADMIN — HYDROMORPHONE HYDROCHLORIDE 1 MG: 1 INJECTION, SOLUTION INTRAMUSCULAR; INTRAVENOUS; SUBCUTANEOUS at 11:19

## 2022-01-01 RX ADMIN — METOPROLOL TARTRATE 50 MG: 50 TABLET, FILM COATED ORAL at 09:28

## 2022-01-01 RX ADMIN — BUDESONIDE 0.5 MG: 0.5 INHALANT RESPIRATORY (INHALATION) at 07:10

## 2022-01-01 RX ADMIN — CEFUROXIME AXETIL 500 MG: 500 TABLET, FILM COATED ORAL at 02:25

## 2022-01-01 RX ADMIN — APIXABAN 5 MG: 5 TABLET, FILM COATED ORAL at 20:08

## 2022-01-01 RX ADMIN — RANOLAZINE 1000 MG: 500 TABLET, FILM COATED, EXTENDED RELEASE ORAL at 20:33

## 2022-01-01 RX ADMIN — OXYCODONE 5 MG: 5 TABLET ORAL at 21:33

## 2022-01-01 RX ADMIN — TRAZODONE HYDROCHLORIDE 100 MG: 100 TABLET ORAL at 20:23

## 2022-01-01 RX ADMIN — ONDANSETRON 4 MG: 2 INJECTION INTRAMUSCULAR; INTRAVENOUS at 02:41

## 2022-01-01 RX ADMIN — GUAIFENESIN 600 MG: 600 TABLET, EXTENDED RELEASE ORAL at 19:52

## 2022-01-01 RX ADMIN — Medication 10 ML: at 11:14

## 2022-01-01 RX ADMIN — FUROSEMIDE 40 MG: 40 TABLET ORAL at 06:24

## 2022-01-01 RX ADMIN — METOPROLOL TARTRATE 25 MG: 25 TABLET, FILM COATED ORAL at 01:01

## 2022-01-01 RX ADMIN — SODIUM CHLORIDE 75 ML/HR: 9 INJECTION, SOLUTION INTRAVENOUS at 18:12

## 2022-01-01 RX ADMIN — NYSTATIN: 100000 POWDER TOPICAL at 08:56

## 2022-01-01 RX ADMIN — METOLAZONE 5 MG: 5 TABLET ORAL at 08:50

## 2022-01-01 RX ADMIN — ATORVASTATIN CALCIUM 40 MG: 40 TABLET, FILM COATED ORAL at 05:20

## 2022-01-01 RX ADMIN — APIXABAN 5 MG: 5 TABLET, FILM COATED ORAL at 08:03

## 2022-01-01 RX ADMIN — HYDROMORPHONE HYDROCHLORIDE 0.5 MG: 1 INJECTION, SOLUTION INTRAMUSCULAR; INTRAVENOUS; SUBCUTANEOUS at 19:12

## 2022-01-01 RX ADMIN — HYDROMORPHONE HYDROCHLORIDE 0.25 MG: 1 INJECTION, SOLUTION INTRAMUSCULAR; INTRAVENOUS; SUBCUTANEOUS at 12:35

## 2022-01-01 RX ADMIN — VANCOMYCIN HYDROCHLORIDE 1750 MG: 10 INJECTION, POWDER, LYOPHILIZED, FOR SOLUTION INTRAVENOUS at 16:58

## 2022-01-01 RX ADMIN — IPRATROPIUM BROMIDE AND ALBUTEROL SULFATE 3 ML: .5; 3 SOLUTION RESPIRATORY (INHALATION) at 06:55

## 2022-01-01 RX ADMIN — MAGNESIUM SULFATE HEPTAHYDRATE 2 G: 2 INJECTION, SOLUTION INTRAVENOUS at 22:16

## 2022-01-01 RX ADMIN — Medication 10 ML: at 09:43

## 2022-01-01 RX ADMIN — OXYCODONE 5 MG: 5 TABLET ORAL at 08:08

## 2022-01-01 RX ADMIN — HYDROMORPHONE HYDROCHLORIDE 0.25 MG: 1 INJECTION, SOLUTION INTRAMUSCULAR; INTRAVENOUS; SUBCUTANEOUS at 21:18

## 2022-01-01 RX ADMIN — METOPROLOL TARTRATE 50 MG: 50 TABLET, FILM COATED ORAL at 07:44

## 2022-01-01 RX ADMIN — TRAZODONE HYDROCHLORIDE 100 MG: 100 TABLET ORAL at 21:53

## 2022-01-01 RX ADMIN — DOCUSATE SODIUM 200 MG: 100 CAPSULE, LIQUID FILLED ORAL at 19:40

## 2022-01-01 RX ADMIN — ONDANSETRON 4 MG: 2 INJECTION INTRAMUSCULAR; INTRAVENOUS at 01:33

## 2022-01-01 RX ADMIN — FAMOTIDINE 40 MG: 20 TABLET ORAL at 06:33

## 2022-01-01 RX ADMIN — FAMOTIDINE 20 MG: 20 TABLET, FILM COATED ORAL at 08:53

## 2022-01-01 RX ADMIN — SODIUM CHLORIDE, POTASSIUM CHLORIDE, SODIUM LACTATE AND CALCIUM CHLORIDE 125 ML/HR: 600; 310; 30; 20 INJECTION, SOLUTION INTRAVENOUS at 15:32

## 2022-01-01 RX ADMIN — CEFTRIAXONE 2 G: 2 INJECTION, POWDER, FOR SOLUTION INTRAMUSCULAR; INTRAVENOUS at 17:08

## 2022-01-01 RX ADMIN — HYDROMORPHONE HYDROCHLORIDE 0.25 MG: 1 INJECTION, SOLUTION INTRAMUSCULAR; INTRAVENOUS; SUBCUTANEOUS at 02:27

## 2022-01-01 RX ADMIN — INSULIN LISPRO 5 UNITS: 100 INJECTION, SOLUTION INTRAVENOUS; SUBCUTANEOUS at 18:09

## 2022-01-01 RX ADMIN — HYDROMORPHONE HYDROCHLORIDE 0.5 MG: 1 INJECTION, SOLUTION INTRAMUSCULAR; INTRAVENOUS; SUBCUTANEOUS at 12:01

## 2022-01-01 RX ADMIN — METHYLPREDNISOLONE SODIUM SUCCINATE 20 MG: 40 INJECTION, POWDER, FOR SOLUTION INTRAMUSCULAR; INTRAVENOUS at 09:05

## 2022-01-01 RX ADMIN — HYDROMORPHONE HYDROCHLORIDE 1 MG: 1 INJECTION, SOLUTION INTRAMUSCULAR; INTRAVENOUS; SUBCUTANEOUS at 15:45

## 2022-01-01 RX ADMIN — OXYCODONE 10 MG: 5 TABLET ORAL at 20:44

## 2022-01-01 RX ADMIN — HYDROMORPHONE HYDROCHLORIDE 0.5 MG: 1 INJECTION, SOLUTION INTRAMUSCULAR; INTRAVENOUS; SUBCUTANEOUS at 08:09

## 2022-01-01 RX ADMIN — HYDROMORPHONE HYDROCHLORIDE 1 MG: 1 INJECTION, SOLUTION INTRAMUSCULAR; INTRAVENOUS; SUBCUTANEOUS at 19:25

## 2022-01-01 RX ADMIN — ENOXAPARIN SODIUM 120 MG: 150 INJECTION SUBCUTANEOUS at 12:14

## 2022-01-01 RX ADMIN — APIXABAN 5 MG: 5 TABLET, FILM COATED ORAL at 08:21

## 2022-01-01 RX ADMIN — DOCUSATE SODIUM 200 MG: 100 CAPSULE, LIQUID FILLED ORAL at 07:54

## 2022-01-01 RX ADMIN — HYDROMORPHONE HYDROCHLORIDE 1 MG: 1 INJECTION, SOLUTION INTRAMUSCULAR; INTRAVENOUS; SUBCUTANEOUS at 22:16

## 2022-01-01 RX ADMIN — Medication 10 ML: at 19:55

## 2022-01-01 RX ADMIN — IPRATROPIUM BROMIDE AND ALBUTEROL SULFATE 3 ML: .5; 3 SOLUTION RESPIRATORY (INHALATION) at 09:56

## 2022-01-01 RX ADMIN — FAMOTIDINE 20 MG: 20 TABLET, FILM COATED ORAL at 17:14

## 2022-01-01 RX ADMIN — BACITRACIN 1 APPLICATION: 500 OINTMENT TOPICAL at 10:19

## 2022-01-01 RX ADMIN — METOCLOPRAMIDE 10 MG: 5 INJECTION, SOLUTION INTRAMUSCULAR; INTRAVENOUS at 08:09

## 2022-01-01 RX ADMIN — NITROGLYCERIN 0.4 MG: 0.4 TABLET SUBLINGUAL at 18:05

## 2022-01-01 RX ADMIN — Medication 10 ML: at 21:24

## 2022-01-01 RX ADMIN — DOCUSATE SODIUM 100 MG: 100 CAPSULE, LIQUID FILLED ORAL at 08:10

## 2022-01-01 RX ADMIN — BUDESONIDE 0.5 MG: 0.5 INHALANT RESPIRATORY (INHALATION) at 08:37

## 2022-01-01 RX ADMIN — GLIPIZIDE 5 MG: 5 TABLET ORAL at 17:27

## 2022-01-01 RX ADMIN — METOPROLOL TARTRATE 25 MG: 25 TABLET, FILM COATED ORAL at 21:58

## 2022-01-01 RX ADMIN — HYDROMORPHONE HYDROCHLORIDE 0.5 MG: 1 INJECTION, SOLUTION INTRAMUSCULAR; INTRAVENOUS; SUBCUTANEOUS at 13:23

## 2022-01-01 RX ADMIN — IPRATROPIUM BROMIDE AND ALBUTEROL SULFATE 3 ML: .5; 3 SOLUTION RESPIRATORY (INHALATION) at 07:20

## 2022-01-01 RX ADMIN — HYDROMORPHONE HYDROCHLORIDE 1 MG: 1 INJECTION, SOLUTION INTRAMUSCULAR; INTRAVENOUS; SUBCUTANEOUS at 21:07

## 2022-01-01 RX ADMIN — CEPHALEXIN 500 MG: 500 CAPSULE ORAL at 20:51

## 2022-01-01 RX ADMIN — FAMOTIDINE 20 MG: 20 TABLET ORAL at 08:10

## 2022-01-01 RX ADMIN — NITROGLYCERIN 0.4 MG: 0.4 TABLET SUBLINGUAL at 18:15

## 2022-01-01 RX ADMIN — APIXABAN 5 MG: 5 TABLET, FILM COATED ORAL at 21:16

## 2022-01-01 RX ADMIN — MIRTAZAPINE 30 MG: 15 TABLET, FILM COATED ORAL at 21:29

## 2022-01-01 RX ADMIN — AMPICILLIN SODIUM AND SULBACTAM SODIUM 1.5 G: 1; .5 INJECTION, POWDER, FOR SOLUTION INTRAMUSCULAR; INTRAVENOUS at 17:15

## 2022-01-01 RX ADMIN — APIXABAN 5 MG: 5 TABLET, FILM COATED ORAL at 20:23

## 2022-01-01 RX ADMIN — HYDROMORPHONE HYDROCHLORIDE 1 MG: 1 INJECTION, SOLUTION INTRAMUSCULAR; INTRAVENOUS; SUBCUTANEOUS at 08:44

## 2022-01-01 RX ADMIN — RANOLAZINE 500 MG: 500 TABLET, FILM COATED, EXTENDED RELEASE ORAL at 15:25

## 2022-01-01 RX ADMIN — METOPROLOL TARTRATE 50 MG: 50 TABLET, FILM COATED ORAL at 10:58

## 2022-01-01 RX ADMIN — OXYCODONE 10 MG: 5 TABLET ORAL at 03:58

## 2022-01-01 RX ADMIN — IPRATROPIUM BROMIDE AND ALBUTEROL SULFATE 3 ML: .5; 3 SOLUTION RESPIRATORY (INHALATION) at 04:49

## 2022-01-01 RX ADMIN — INSULIN LISPRO 2 UNITS: 100 INJECTION, SOLUTION INTRAVENOUS; SUBCUTANEOUS at 12:00

## 2022-01-01 RX ADMIN — POTASSIUM CHLORIDE 40 MEQ: 1500 TABLET, EXTENDED RELEASE ORAL at 22:17

## 2022-01-01 RX ADMIN — DOCUSATE SODIUM 100 MG: 100 CAPSULE, LIQUID FILLED ORAL at 20:36

## 2022-01-01 RX ADMIN — RANOLAZINE 500 MG: 500 TABLET, FILM COATED, EXTENDED RELEASE ORAL at 20:01

## 2022-01-01 RX ADMIN — HYDROMORPHONE HYDROCHLORIDE 0.5 MG: 1 INJECTION, SOLUTION INTRAMUSCULAR; INTRAVENOUS; SUBCUTANEOUS at 01:16

## 2022-01-01 RX ADMIN — TRAZODONE HYDROCHLORIDE 50 MG: 50 TABLET ORAL at 02:13

## 2022-01-01 RX ADMIN — IPRATROPIUM BROMIDE AND ALBUTEROL SULFATE 3 ML: .5; 3 SOLUTION RESPIRATORY (INHALATION) at 19:18

## 2022-01-01 RX ADMIN — ROPINIROLE HYDROCHLORIDE 1 MG: 1 TABLET, FILM COATED ORAL at 20:09

## 2022-01-01 RX ADMIN — PREGABALIN 150 MG: 75 CAPSULE ORAL at 20:44

## 2022-01-01 RX ADMIN — OXYCODONE 10 MG: 5 TABLET ORAL at 09:03

## 2022-01-01 RX ADMIN — SUCRALFATE 1 G: 1 TABLET ORAL at 08:18

## 2022-01-01 RX ADMIN — HYDROMORPHONE HYDROCHLORIDE 0.25 MG: 1 INJECTION, SOLUTION INTRAMUSCULAR; INTRAVENOUS; SUBCUTANEOUS at 10:50

## 2022-01-01 RX ADMIN — Medication 3 ML: at 21:42

## 2022-01-01 RX ADMIN — METHYLPREDNISOLONE SODIUM SUCCINATE 40 MG: 40 INJECTION, POWDER, FOR SOLUTION INTRAMUSCULAR; INTRAVENOUS at 05:54

## 2022-01-01 RX ADMIN — ISOSORBIDE MONONITRATE 120 MG: 30 TABLET, EXTENDED RELEASE ORAL at 08:44

## 2022-01-01 RX ADMIN — ENOXAPARIN SODIUM 120 MG: 150 INJECTION SUBCUTANEOUS at 11:34

## 2022-01-01 RX ADMIN — ROPINIROLE HYDROCHLORIDE 0.5 MG: 0.25 TABLET, FILM COATED ORAL at 21:07

## 2022-01-01 RX ADMIN — FAMOTIDINE 20 MG: 10 INJECTION INTRAVENOUS at 07:06

## 2022-01-01 RX ADMIN — METOCLOPRAMIDE 5 MG: 5 TABLET ORAL at 06:16

## 2022-01-01 RX ADMIN — APIXABAN 5 MG: 5 TABLET, FILM COATED ORAL at 19:56

## 2022-01-01 RX ADMIN — HYDROMORPHONE HYDROCHLORIDE 0.5 MG: 1 INJECTION, SOLUTION INTRAMUSCULAR; INTRAVENOUS; SUBCUTANEOUS at 17:45

## 2022-01-01 RX ADMIN — TRAZODONE HYDROCHLORIDE 100 MG: 100 TABLET ORAL at 20:09

## 2022-01-01 RX ADMIN — INSULIN LISPRO 6 UNITS: 100 INJECTION, SOLUTION INTRAVENOUS; SUBCUTANEOUS at 07:28

## 2022-01-01 RX ADMIN — BUDESONIDE 0.5 MG: 0.5 INHALANT RESPIRATORY (INHALATION) at 18:31

## 2022-01-01 RX ADMIN — HYDROMORPHONE HYDROCHLORIDE 0.5 MG: 1 INJECTION, SOLUTION INTRAMUSCULAR; INTRAVENOUS; SUBCUTANEOUS at 12:34

## 2022-01-01 RX ADMIN — INSULIN GLARGINE 10 UNITS: 100 INJECTION, SOLUTION SUBCUTANEOUS at 11:42

## 2022-01-01 RX ADMIN — Medication 10 ML: at 16:11

## 2022-01-01 RX ADMIN — HYDROMORPHONE HYDROCHLORIDE 0.25 MG: 1 INJECTION, SOLUTION INTRAMUSCULAR; INTRAVENOUS; SUBCUTANEOUS at 06:16

## 2022-01-01 RX ADMIN — BENZONATATE 100 MG: 100 CAPSULE ORAL at 01:38

## 2022-01-01 RX ADMIN — HYDROMORPHONE HYDROCHLORIDE 0.5 MG: 1 INJECTION, SOLUTION INTRAMUSCULAR; INTRAVENOUS; SUBCUTANEOUS at 09:08

## 2022-01-01 RX ADMIN — GUAIFENESIN 600 MG: 600 TABLET, EXTENDED RELEASE ORAL at 11:58

## 2022-01-01 RX ADMIN — INSULIN GLARGINE 10 UNITS: 100 INJECTION, SOLUTION SUBCUTANEOUS at 07:56

## 2022-01-01 RX ADMIN — SODIUM CHLORIDE 500 ML: 9 INJECTION, SOLUTION INTRAVENOUS at 15:54

## 2022-01-01 RX ADMIN — GLYCOPYRROLATE 0.6 MCG: 0.2 INJECTION INTRAMUSCULAR; INTRAVENOUS at 08:50

## 2022-01-01 RX ADMIN — HYDROMORPHONE HYDROCHLORIDE 0.5 MG: 1 INJECTION, SOLUTION INTRAMUSCULAR; INTRAVENOUS; SUBCUTANEOUS at 04:11

## 2022-01-01 RX ADMIN — BUDESONIDE 0.5 MG: 0.5 INHALANT RESPIRATORY (INHALATION) at 08:11

## 2022-01-01 RX ADMIN — IPRATROPIUM BROMIDE AND ALBUTEROL SULFATE 3 ML: .5; 3 SOLUTION RESPIRATORY (INHALATION) at 23:10

## 2022-01-01 RX ADMIN — RANOLAZINE 500 MG: 500 TABLET, FILM COATED, EXTENDED RELEASE ORAL at 21:25

## 2022-01-01 RX ADMIN — HYDROMORPHONE HYDROCHLORIDE 0.25 MG: 1 INJECTION, SOLUTION INTRAMUSCULAR; INTRAVENOUS; SUBCUTANEOUS at 16:07

## 2022-01-01 RX ADMIN — RANOLAZINE 500 MG: 500 TABLET, FILM COATED, EXTENDED RELEASE ORAL at 19:53

## 2022-01-01 RX ADMIN — GUAIFENESIN 600 MG: 600 TABLET, EXTENDED RELEASE ORAL at 21:25

## 2022-01-01 RX ADMIN — INSULIN LISPRO 7 UNITS: 100 INJECTION, SOLUTION INTRAVENOUS; SUBCUTANEOUS at 11:13

## 2022-01-01 RX ADMIN — METOPROLOL TARTRATE 50 MG: 50 TABLET, FILM COATED ORAL at 17:08

## 2022-01-01 RX ADMIN — HYDROMORPHONE HYDROCHLORIDE 0.5 MG: 1 INJECTION, SOLUTION INTRAMUSCULAR; INTRAVENOUS; SUBCUTANEOUS at 09:12

## 2022-01-01 RX ADMIN — HYDROMORPHONE HYDROCHLORIDE 0.5 MG: 1 INJECTION, SOLUTION INTRAMUSCULAR; INTRAVENOUS; SUBCUTANEOUS at 02:57

## 2022-01-01 RX ADMIN — APIXABAN 5 MG: 5 TABLET, FILM COATED ORAL at 20:51

## 2022-01-01 RX ADMIN — DOCUSATE SODIUM 100 MG: 100 CAPSULE, LIQUID FILLED ORAL at 21:28

## 2022-01-01 RX ADMIN — ATORVASTATIN CALCIUM 40 MG: 40 TABLET, FILM COATED ORAL at 20:37

## 2022-01-01 RX ADMIN — INSULIN LISPRO 4 UNITS: 100 INJECTION, SOLUTION INTRAVENOUS; SUBCUTANEOUS at 07:35

## 2022-01-01 RX ADMIN — TRAZODONE HYDROCHLORIDE 100 MG: 100 TABLET ORAL at 21:19

## 2022-01-01 RX ADMIN — CEFEPIME HYDROCHLORIDE 2 G: 2 INJECTION, POWDER, FOR SOLUTION INTRAVENOUS at 14:50

## 2022-01-01 RX ADMIN — PREGABALIN 150 MG: 75 CAPSULE ORAL at 21:25

## 2022-01-01 RX ADMIN — TRAZODONE HYDROCHLORIDE 100 MG: 100 TABLET ORAL at 22:09

## 2022-01-01 RX ADMIN — PREGABALIN 150 MG: 75 CAPSULE ORAL at 20:37

## 2022-01-01 RX ADMIN — METOLAZONE 5 MG: 5 TABLET ORAL at 08:49

## 2022-01-01 RX ADMIN — SODIUM CHLORIDE 250 MG: 9 INJECTION, SOLUTION INTRAVENOUS at 21:33

## 2022-01-01 RX ADMIN — ASPIRIN 81 MG: 81 TABLET, COATED ORAL at 08:11

## 2022-01-01 RX ADMIN — SUCRALFATE 1 G: 1 TABLET ORAL at 20:33

## 2022-01-01 RX ADMIN — POTASSIUM PHOSPHATE, MONOBASIC 1000 MG: 500 TABLET, SOLUBLE ORAL at 10:09

## 2022-01-01 RX ADMIN — INSULIN LISPRO 12 UNITS: 100 INJECTION, SOLUTION INTRAVENOUS; SUBCUTANEOUS at 17:27

## 2022-01-01 RX ADMIN — HYDROMORPHONE HYDROCHLORIDE 0.25 MG: 1 INJECTION, SOLUTION INTRAMUSCULAR; INTRAVENOUS; SUBCUTANEOUS at 19:54

## 2022-01-01 RX ADMIN — VANCOMYCIN HYDROCHLORIDE 1500 MG: 10 INJECTION, POWDER, LYOPHILIZED, FOR SOLUTION INTRAVENOUS at 19:53

## 2022-01-01 RX ADMIN — SUCRALFATE 1 G: 1 TABLET ORAL at 16:58

## 2022-01-01 RX ADMIN — METOPROLOL TARTRATE 25 MG: 25 TABLET, FILM COATED ORAL at 08:14

## 2022-01-01 RX ADMIN — MIRTAZAPINE 30 MG: 15 TABLET, FILM COATED ORAL at 20:46

## 2022-01-01 RX ADMIN — METOPROLOL TARTRATE 50 MG: 50 TABLET, FILM COATED ORAL at 08:10

## 2022-01-01 RX ADMIN — CEFTRIAXONE SODIUM 2 G: 2 INJECTION, POWDER, FOR SOLUTION INTRAMUSCULAR; INTRAVENOUS at 20:40

## 2022-01-01 RX ADMIN — OXYCODONE 10 MG: 5 TABLET ORAL at 20:01

## 2022-01-01 RX ADMIN — Medication 3 ML: at 13:30

## 2022-01-01 RX ADMIN — HYDROMORPHONE HYDROCHLORIDE 0.5 MG: 1 INJECTION, SOLUTION INTRAMUSCULAR; INTRAVENOUS; SUBCUTANEOUS at 10:12

## 2022-01-01 RX ADMIN — INSULIN LISPRO 5 UNITS: 100 INJECTION, SOLUTION INTRAVENOUS; SUBCUTANEOUS at 09:09

## 2022-01-01 RX ADMIN — FAMOTIDINE 20 MG: 20 TABLET ORAL at 16:53

## 2022-01-01 RX ADMIN — HYDROMORPHONE HYDROCHLORIDE 0.25 MG: 1 INJECTION, SOLUTION INTRAMUSCULAR; INTRAVENOUS; SUBCUTANEOUS at 10:51

## 2022-01-01 RX ADMIN — AMOXICILLIN AND CLAVULANATE POTASSIUM 1 TABLET: 875; 125 TABLET, FILM COATED ORAL at 08:21

## 2022-01-01 RX ADMIN — HYDROMORPHONE HYDROCHLORIDE 0.5 MG: 1 INJECTION, SOLUTION INTRAMUSCULAR; INTRAVENOUS; SUBCUTANEOUS at 17:19

## 2022-01-01 RX ADMIN — HYDROMORPHONE HYDROCHLORIDE 0.5 MG: 1 INJECTION, SOLUTION INTRAMUSCULAR; INTRAVENOUS; SUBCUTANEOUS at 00:43

## 2022-01-01 RX ADMIN — HYDROXYZINE HYDROCHLORIDE 25 MG: 25 TABLET ORAL at 00:15

## 2022-01-01 RX ADMIN — BUDESONIDE 0.5 MG: 0.5 INHALANT RESPIRATORY (INHALATION) at 08:04

## 2022-01-01 RX ADMIN — CEPHALEXIN 500 MG: 500 CAPSULE ORAL at 16:01

## 2022-01-01 RX ADMIN — INSULIN LISPRO 7 UNITS: 100 INJECTION, SOLUTION INTRAVENOUS; SUBCUTANEOUS at 11:25

## 2022-01-01 RX ADMIN — Medication 10 ML: at 20:40

## 2022-01-01 RX ADMIN — METOPROLOL TARTRATE 25 MG: 25 TABLET, FILM COATED ORAL at 20:31

## 2022-01-01 RX ADMIN — HYDROMORPHONE HYDROCHLORIDE 0.5 MG: 1 INJECTION, SOLUTION INTRAMUSCULAR; INTRAVENOUS; SUBCUTANEOUS at 14:40

## 2022-01-01 RX ADMIN — METHYLPREDNISOLONE SODIUM SUCCINATE 40 MG: 40 INJECTION, POWDER, FOR SOLUTION INTRAMUSCULAR; INTRAVENOUS at 09:52

## 2022-01-01 RX ADMIN — HYDROMORPHONE HYDROCHLORIDE 0.25 MG: 1 INJECTION, SOLUTION INTRAMUSCULAR; INTRAVENOUS; SUBCUTANEOUS at 16:19

## 2022-01-01 RX ADMIN — BUDESONIDE 0.5 MG: 0.5 INHALANT RESPIRATORY (INHALATION) at 06:48

## 2022-01-01 RX ADMIN — AMOXICILLIN AND CLAVULANATE POTASSIUM 1 TABLET: 875; 125 TABLET, FILM COATED ORAL at 21:59

## 2022-01-01 RX ADMIN — BUDESONIDE 0.5 MG: 0.5 INHALANT RESPIRATORY (INHALATION) at 07:30

## 2022-01-01 RX ADMIN — DOCUSATE SODIUM 100 MG: 100 CAPSULE, LIQUID FILLED ORAL at 08:02

## 2022-01-01 RX ADMIN — AMPICILLIN SODIUM AND SULBACTAM SODIUM 1.5 G: 1; .5 INJECTION, POWDER, FOR SOLUTION INTRAMUSCULAR; INTRAVENOUS at 23:24

## 2022-01-01 RX ADMIN — OXYCODONE 10 MG: 5 TABLET ORAL at 19:55

## 2022-01-01 RX ADMIN — HYDROXYZINE HYDROCHLORIDE 25 MG: 25 TABLET, FILM COATED ORAL at 19:57

## 2022-01-01 RX ADMIN — Medication 10 ML: at 23:00

## 2022-01-01 RX ADMIN — DOCUSATE SODIUM 200 MG: 100 CAPSULE, LIQUID FILLED ORAL at 21:28

## 2022-01-01 RX ADMIN — SUCRALFATE 1 G: 1 TABLET ORAL at 16:47

## 2022-01-01 RX ADMIN — FUROSEMIDE 40 MG: 40 TABLET ORAL at 08:10

## 2022-01-01 RX ADMIN — ONDANSETRON 4 MG: 2 INJECTION INTRAMUSCULAR; INTRAVENOUS at 00:48

## 2022-01-01 RX ADMIN — ONDANSETRON 4 MG: 2 INJECTION INTRAMUSCULAR; INTRAVENOUS at 17:19

## 2022-01-01 RX ADMIN — SODIUM CHLORIDE 75 ML/HR: 9 INJECTION, SOLUTION INTRAVENOUS at 17:15

## 2022-01-01 RX ADMIN — DOCUSATE SODIUM 200 MG: 100 CAPSULE, LIQUID FILLED ORAL at 20:33

## 2022-01-01 RX ADMIN — HYDROMORPHONE HYDROCHLORIDE 0.25 MG: 1 INJECTION, SOLUTION INTRAMUSCULAR; INTRAVENOUS; SUBCUTANEOUS at 02:26

## 2022-01-01 RX ADMIN — Medication 10 ML: at 20:01

## 2022-01-01 RX ADMIN — BUDESONIDE 0.5 MG: 0.5 INHALANT RESPIRATORY (INHALATION) at 18:49

## 2022-01-01 RX ADMIN — FUROSEMIDE 40 MG: 40 TABLET ORAL at 06:33

## 2022-01-01 RX ADMIN — BENZONATATE 100 MG: 100 CAPSULE ORAL at 16:31

## 2022-01-01 RX ADMIN — FAMOTIDINE 20 MG: 20 TABLET, FILM COATED ORAL at 16:22

## 2022-01-01 RX ADMIN — HYDROMORPHONE HYDROCHLORIDE 0.25 MG: 1 INJECTION, SOLUTION INTRAMUSCULAR; INTRAVENOUS; SUBCUTANEOUS at 15:14

## 2022-01-01 RX ADMIN — IPRATROPIUM BROMIDE AND ALBUTEROL SULFATE 3 ML: .5; 3 SOLUTION RESPIRATORY (INHALATION) at 14:26

## 2022-01-01 RX ADMIN — RANOLAZINE 1000 MG: 500 TABLET, FILM COATED, EXTENDED RELEASE ORAL at 08:46

## 2022-01-01 RX ADMIN — BUDESONIDE AND FORMOTEROL FUMARATE DIHYDRATE 2 PUFF: 80; 4.5 AEROSOL RESPIRATORY (INHALATION) at 07:21

## 2022-01-01 RX ADMIN — HYDROMORPHONE HYDROCHLORIDE 0.25 MG: 1 INJECTION, SOLUTION INTRAMUSCULAR; INTRAVENOUS; SUBCUTANEOUS at 18:26

## 2022-01-01 RX ADMIN — SUCRALFATE 1 G: 1 TABLET ORAL at 12:30

## 2022-01-01 RX ADMIN — INSULIN GLARGINE 15 UNITS: 100 INJECTION, SOLUTION SUBCUTANEOUS at 13:53

## 2022-01-01 RX ADMIN — RANOLAZINE 1000 MG: 500 TABLET, FILM COATED, EXTENDED RELEASE ORAL at 20:08

## 2022-01-01 RX ADMIN — BUDESONIDE 0.5 MG: 0.5 INHALANT RESPIRATORY (INHALATION) at 06:41

## 2022-01-01 RX ADMIN — METOLAZONE 5 MG: 5 TABLET ORAL at 08:24

## 2022-01-01 RX ADMIN — METOCLOPRAMIDE 5 MG: 5 TABLET ORAL at 23:38

## 2022-01-01 RX ADMIN — HYDROMORPHONE HYDROCHLORIDE 0.25 MG: 1 INJECTION, SOLUTION INTRAMUSCULAR; INTRAVENOUS; SUBCUTANEOUS at 16:47

## 2022-01-01 RX ADMIN — INSULIN LISPRO 12 UNITS: 100 INJECTION, SOLUTION INTRAVENOUS; SUBCUTANEOUS at 08:13

## 2022-01-01 RX ADMIN — PREGABALIN 150 MG: 75 CAPSULE ORAL at 10:57

## 2022-01-01 RX ADMIN — HYDROMORPHONE HYDROCHLORIDE 0.5 MG: 1 INJECTION, SOLUTION INTRAMUSCULAR; INTRAVENOUS; SUBCUTANEOUS at 18:10

## 2022-01-01 RX ADMIN — CEFEPIME HYDROCHLORIDE 2 G: 2 INJECTION, POWDER, FOR SOLUTION INTRAVENOUS at 05:06

## 2022-01-01 RX ADMIN — DOCUSATE SODIUM 200 MG: 100 CAPSULE, LIQUID FILLED ORAL at 20:52

## 2022-01-01 RX ADMIN — BUDESONIDE 0.5 MG: 0.5 INHALANT RESPIRATORY (INHALATION) at 07:38

## 2022-01-01 RX ADMIN — RANOLAZINE 500 MG: 500 TABLET, FILM COATED, EXTENDED RELEASE ORAL at 10:51

## 2022-01-01 RX ADMIN — INSULIN LISPRO 3 UNITS: 100 INJECTION, SOLUTION INTRAVENOUS; SUBCUTANEOUS at 07:55

## 2022-01-01 RX ADMIN — HYDROMORPHONE HYDROCHLORIDE 1 MG: 1 INJECTION, SOLUTION INTRAMUSCULAR; INTRAVENOUS; SUBCUTANEOUS at 03:24

## 2022-01-01 RX ADMIN — BUDESONIDE 0.5 MG: 0.5 INHALANT RESPIRATORY (INHALATION) at 07:41

## 2022-01-01 RX ADMIN — GLIPIZIDE 5 MG: 5 TABLET ORAL at 07:34

## 2022-01-01 RX ADMIN — FUROSEMIDE 40 MG: 40 TABLET ORAL at 08:46

## 2022-01-01 RX ADMIN — OXYCODONE 10 MG: 5 TABLET ORAL at 02:08

## 2022-01-01 RX ADMIN — FAMOTIDINE 20 MG: 10 INJECTION INTRAVENOUS at 09:29

## 2022-01-01 RX ADMIN — AMPICILLIN SODIUM AND SULBACTAM SODIUM 1.5 G: 1; .5 INJECTION, POWDER, FOR SOLUTION INTRAMUSCULAR; INTRAVENOUS at 04:47

## 2022-01-01 RX ADMIN — INSULIN HUMAN 8 UNITS: 100 INJECTION, SOLUTION PARENTERAL at 08:01

## 2022-01-01 RX ADMIN — PREGABALIN 150 MG: 75 CAPSULE ORAL at 01:00

## 2022-01-01 RX ADMIN — FUROSEMIDE 40 MG: 40 TABLET ORAL at 07:54

## 2022-01-01 RX ADMIN — ONDANSETRON 4 MG: 2 INJECTION INTRAMUSCULAR; INTRAVENOUS at 15:09

## 2022-01-01 RX ADMIN — FUROSEMIDE 40 MG: 40 TABLET ORAL at 07:44

## 2022-01-01 RX ADMIN — ISOSORBIDE MONONITRATE 120 MG: 60 TABLET, EXTENDED RELEASE ORAL at 07:43

## 2022-01-01 RX ADMIN — OXYCODONE 5 MG: 5 TABLET ORAL at 13:51

## 2022-01-01 RX ADMIN — IPRATROPIUM BROMIDE AND ALBUTEROL SULFATE 3 ML: .5; 3 SOLUTION RESPIRATORY (INHALATION) at 02:15

## 2022-01-01 RX ADMIN — PREGABALIN 150 MG: 75 CAPSULE ORAL at 19:56

## 2022-01-01 RX ADMIN — GLIPIZIDE 5 MG: 5 TABLET ORAL at 10:12

## 2022-01-01 RX ADMIN — OXYCODONE 5 MG: 5 TABLET ORAL at 12:54

## 2022-01-01 RX ADMIN — METOPROLOL TARTRATE 25 MG: 25 TABLET, FILM COATED ORAL at 10:12

## 2022-01-01 RX ADMIN — ONDANSETRON 4 MG: 2 INJECTION INTRAMUSCULAR; INTRAVENOUS at 19:35

## 2022-01-01 RX ADMIN — METOPROLOL TARTRATE 50 MG: 50 TABLET, FILM COATED ORAL at 07:34

## 2022-01-01 RX ADMIN — ROPINIROLE HYDROCHLORIDE 1 MG: 1 TABLET, FILM COATED ORAL at 20:37

## 2022-01-01 RX ADMIN — PREGABALIN 150 MG: 75 CAPSULE ORAL at 20:26

## 2022-01-01 RX ADMIN — FAMOTIDINE 20 MG: 10 INJECTION INTRAVENOUS at 19:12

## 2022-01-01 RX ADMIN — SUCRALFATE 1 G: 1 TABLET ORAL at 22:09

## 2022-01-01 RX ADMIN — BUDESONIDE 0.5 MG: 0.5 INHALANT RESPIRATORY (INHALATION) at 07:17

## 2022-01-01 RX ADMIN — DOCUSATE SODIUM 200 MG: 100 CAPSULE, LIQUID FILLED ORAL at 22:21

## 2022-01-01 RX ADMIN — HYDROMORPHONE HYDROCHLORIDE 0.25 MG: 1 INJECTION, SOLUTION INTRAMUSCULAR; INTRAVENOUS; SUBCUTANEOUS at 22:45

## 2022-01-01 RX ADMIN — ROPINIROLE HYDROCHLORIDE 1 MG: 1 TABLET, FILM COATED ORAL at 21:29

## 2022-01-01 RX ADMIN — Medication 1 MG: at 15:17

## 2022-01-01 RX ADMIN — APIXABAN 5 MG: 5 TABLET, FILM COATED ORAL at 08:42

## 2022-01-01 RX ADMIN — BUDESONIDE AND FORMOTEROL FUMARATE DIHYDRATE 2 PUFF: 80; 4.5 AEROSOL RESPIRATORY (INHALATION) at 06:46

## 2022-01-01 RX ADMIN — BUDESONIDE 0.5 MG: 0.5 INHALANT RESPIRATORY (INHALATION) at 08:14

## 2022-01-01 RX ADMIN — METOPROLOL TARTRATE 25 MG: 25 TABLET, FILM COATED ORAL at 20:44

## 2022-01-01 RX ADMIN — HYDROMORPHONE HYDROCHLORIDE 0.5 MG: 1 INJECTION, SOLUTION INTRAMUSCULAR; INTRAVENOUS; SUBCUTANEOUS at 07:50

## 2022-01-01 RX ADMIN — FAMOTIDINE 20 MG: 20 TABLET ORAL at 09:32

## 2022-01-01 RX ADMIN — METOPROLOL TARTRATE 50 MG: 50 TABLET, FILM COATED ORAL at 17:00

## 2022-01-01 RX ADMIN — RANOLAZINE 1000 MG: 500 TABLET, FILM COATED, EXTENDED RELEASE ORAL at 20:53

## 2022-01-01 RX ADMIN — OXYCODONE 10 MG: 5 TABLET ORAL at 15:43

## 2022-01-01 RX ADMIN — METOCLOPRAMIDE 5 MG: 5 INJECTION, SOLUTION INTRAMUSCULAR; INTRAVENOUS at 14:12

## 2022-01-01 RX ADMIN — DOCUSATE SODIUM 100 MG: 100 CAPSULE, LIQUID FILLED ORAL at 20:40

## 2022-01-01 RX ADMIN — AMPICILLIN SODIUM AND SULBACTAM SODIUM 1.5 G: 1; .5 INJECTION, POWDER, FOR SOLUTION INTRAMUSCULAR; INTRAVENOUS at 13:08

## 2022-01-01 RX ADMIN — SODIUM CHLORIDE 125 ML/HR: 9 INJECTION, SOLUTION INTRAVENOUS at 04:11

## 2022-01-01 RX ADMIN — ONDANSETRON 4 MG: 2 INJECTION INTRAMUSCULAR; INTRAVENOUS at 12:44

## 2022-01-01 RX ADMIN — LORAZEPAM 0.5 MG: 0.5 TABLET ORAL at 11:37

## 2022-01-01 RX ADMIN — AMPICILLIN SODIUM AND SULBACTAM SODIUM 1.5 G: 1; .5 INJECTION, POWDER, FOR SOLUTION INTRAMUSCULAR; INTRAVENOUS at 16:44

## 2022-01-01 RX ADMIN — IPRATROPIUM BROMIDE AND ALBUTEROL SULFATE 3 ML: .5; 3 SOLUTION RESPIRATORY (INHALATION) at 19:08

## 2022-01-01 RX ADMIN — RANOLAZINE 500 MG: 500 TABLET, FILM COATED, EXTENDED RELEASE ORAL at 20:25

## 2022-01-01 RX ADMIN — APIXABAN 5 MG: 5 TABLET, FILM COATED ORAL at 21:21

## 2022-01-01 RX ADMIN — IPRATROPIUM BROMIDE AND ALBUTEROL SULFATE 3 ML: 2.5; .5 SOLUTION RESPIRATORY (INHALATION) at 13:48

## 2022-01-01 RX ADMIN — METOPROLOL TARTRATE 50 MG: 50 TABLET, FILM COATED ORAL at 08:46

## 2022-01-01 RX ADMIN — Medication 3 ML: at 20:48

## 2022-01-01 RX ADMIN — HYDROMORPHONE HYDROCHLORIDE 0.5 MG: 1 INJECTION, SOLUTION INTRAMUSCULAR; INTRAVENOUS; SUBCUTANEOUS at 03:27

## 2022-01-01 RX ADMIN — INSULIN GLARGINE 10 UNITS: 100 INJECTION, SOLUTION SUBCUTANEOUS at 07:45

## 2022-01-01 RX ADMIN — IPRATROPIUM BROMIDE AND ALBUTEROL SULFATE 3 ML: .5; 3 SOLUTION RESPIRATORY (INHALATION) at 19:41

## 2022-01-01 RX ADMIN — ONDANSETRON 4 MG: 2 INJECTION INTRAMUSCULAR; INTRAVENOUS at 13:43

## 2022-01-01 RX ADMIN — Medication 10 ML: at 23:01

## 2022-01-01 RX ADMIN — SODIUM ZIRCONIUM CYCLOSILICATE 10 G: 10 POWDER, FOR SUSPENSION ORAL at 22:21

## 2022-01-01 RX ADMIN — PREDNISONE 40 MG: 20 TABLET ORAL at 11:59

## 2022-01-01 RX ADMIN — PREGABALIN 150 MG: 75 CAPSULE ORAL at 19:53

## 2022-01-01 RX ADMIN — APIXABAN 5 MG: 5 TABLET, FILM COATED ORAL at 22:09

## 2022-01-01 RX ADMIN — Medication 200 MCG: at 14:41

## 2022-01-01 RX ADMIN — OXYCODONE 10 MG: 5 TABLET ORAL at 01:04

## 2022-01-01 RX ADMIN — PREGABALIN 150 MG: 75 CAPSULE ORAL at 08:14

## 2022-01-01 RX ADMIN — POTASSIUM CHLORIDE 40 MEQ: 1500 TABLET, EXTENDED RELEASE ORAL at 09:15

## 2022-01-01 RX ADMIN — OXYCODONE 10 MG: 5 TABLET ORAL at 05:07

## 2022-01-01 RX ADMIN — ATORVASTATIN CALCIUM 40 MG: 40 TABLET, FILM COATED ORAL at 05:03

## 2022-01-01 RX ADMIN — Medication 3 ML: at 08:21

## 2022-01-01 RX ADMIN — MIRTAZAPINE 30 MG: 15 TABLET, FILM COATED ORAL at 20:25

## 2022-01-01 RX ADMIN — GLIPIZIDE 5 MG: 5 TABLET ORAL at 07:44

## 2022-01-01 RX ADMIN — OXYCODONE 5 MG: 5 TABLET ORAL at 18:03

## 2022-01-01 RX ADMIN — PROPOFOL 80 MG: 10 INJECTION, EMULSION INTRAVENOUS at 14:32

## 2022-01-01 RX ADMIN — ISOSORBIDE MONONITRATE 120 MG: 60 TABLET, EXTENDED RELEASE ORAL at 08:18

## 2022-01-01 RX ADMIN — DOCUSATE SODIUM 200 MG: 100 CAPSULE, LIQUID FILLED ORAL at 08:35

## 2022-01-01 RX ADMIN — ONDANSETRON 4 MG: 2 INJECTION INTRAMUSCULAR; INTRAVENOUS at 04:28

## 2022-01-01 RX ADMIN — HYDROMORPHONE HYDROCHLORIDE 0.5 MG: 1 INJECTION, SOLUTION INTRAMUSCULAR; INTRAVENOUS; SUBCUTANEOUS at 03:25

## 2022-01-01 RX ADMIN — PREGABALIN 150 MG: 75 CAPSULE ORAL at 09:33

## 2022-01-01 RX ADMIN — AMOXICILLIN AND CLAVULANATE POTASSIUM 1 TABLET: 875; 125 TABLET, FILM COATED ORAL at 20:25

## 2022-01-01 RX ADMIN — ONDANSETRON 4 MG: 2 INJECTION INTRAMUSCULAR; INTRAVENOUS at 23:04

## 2022-01-01 RX ADMIN — ROPINIROLE HYDROCHLORIDE 0.5 MG: 0.25 TABLET, FILM COATED ORAL at 21:21

## 2022-01-01 RX ADMIN — GUAIFENESIN 600 MG: 600 TABLET, EXTENDED RELEASE ORAL at 08:02

## 2022-01-01 RX ADMIN — FAMOTIDINE 20 MG: 10 INJECTION INTRAVENOUS at 08:26

## 2022-01-01 RX ADMIN — APIXABAN 5 MG: 5 TABLET, FILM COATED ORAL at 10:12

## 2022-01-01 RX ADMIN — ISOSORBIDE MONONITRATE 120 MG: 60 TABLET, EXTENDED RELEASE ORAL at 09:13

## 2022-01-01 RX ADMIN — FUROSEMIDE 20 MG: 10 INJECTION, SOLUTION INTRAMUSCULAR; INTRAVENOUS at 06:25

## 2022-01-01 RX ADMIN — PREDNISONE 20 MG: 20 TABLET ORAL at 08:16

## 2022-01-01 RX ADMIN — METOPROLOL TARTRATE 25 MG: 25 TABLET, FILM COATED ORAL at 11:56

## 2022-01-01 RX ADMIN — HYDROMORPHONE HYDROCHLORIDE 0.5 MG: 1 INJECTION, SOLUTION INTRAMUSCULAR; INTRAVENOUS; SUBCUTANEOUS at 22:04

## 2022-01-01 RX ADMIN — AMOXICILLIN AND CLAVULANATE POTASSIUM 1 TABLET: 875; 125 TABLET, FILM COATED ORAL at 21:53

## 2022-01-01 RX ADMIN — DOCUSATE SODIUM 200 MG: 100 CAPSULE, LIQUID FILLED ORAL at 10:57

## 2022-01-01 RX ADMIN — Medication 10 ML: at 13:18

## 2022-01-01 RX ADMIN — CEFEPIME HYDROCHLORIDE 2 G: 2 INJECTION, POWDER, FOR SOLUTION INTRAVENOUS at 05:54

## 2022-01-01 RX ADMIN — FAMOTIDINE 40 MG: 20 TABLET ORAL at 08:49

## 2022-01-01 RX ADMIN — SODIUM CHLORIDE 100 ML/HR: 9 INJECTION, SOLUTION INTRAVENOUS at 18:15

## 2022-01-01 RX ADMIN — TRAZODONE HYDROCHLORIDE 100 MG: 100 TABLET ORAL at 20:48

## 2022-01-01 RX ADMIN — AMPICILLIN SODIUM AND SULBACTAM SODIUM 1.5 G: 1; .5 INJECTION, POWDER, FOR SOLUTION INTRAMUSCULAR; INTRAVENOUS at 11:57

## 2022-01-01 RX ADMIN — FAMOTIDINE 20 MG: 20 TABLET ORAL at 17:43

## 2022-01-01 RX ADMIN — SODIUM CHLORIDE, POTASSIUM CHLORIDE, SODIUM LACTATE AND CALCIUM CHLORIDE 150 ML/HR: 600; 310; 30; 20 INJECTION, SOLUTION INTRAVENOUS at 15:37

## 2022-01-01 RX ADMIN — FUROSEMIDE 40 MG: 40 TABLET ORAL at 07:51

## 2022-01-01 RX ADMIN — PROPOFOL 80 MG: 10 INJECTION, EMULSION INTRAVENOUS at 08:35

## 2022-01-01 RX ADMIN — OXYCODONE 10 MG: 5 TABLET ORAL at 03:24

## 2022-01-01 RX ADMIN — IPRATROPIUM BROMIDE AND ALBUTEROL SULFATE 3 ML: 2.5; .5 SOLUTION RESPIRATORY (INHALATION) at 12:30

## 2022-01-01 RX ADMIN — PREDNISONE 40 MG: 20 TABLET ORAL at 08:08

## 2022-01-01 RX ADMIN — FAMOTIDINE 20 MG: 20 TABLET ORAL at 10:13

## 2022-01-01 RX ADMIN — TRAZODONE HYDROCHLORIDE 50 MG: 50 TABLET ORAL at 19:13

## 2022-01-01 RX ADMIN — INSULIN LISPRO 20 UNITS: 100 INJECTION, SOLUTION INTRAVENOUS; SUBCUTANEOUS at 11:23

## 2022-01-01 RX ADMIN — INSULIN LISPRO 4 UNITS: 100 INJECTION, SOLUTION INTRAVENOUS; SUBCUTANEOUS at 17:40

## 2022-01-01 RX ADMIN — APIXABAN 5 MG: 5 TABLET, FILM COATED ORAL at 09:32

## 2022-01-01 RX ADMIN — APIXABAN 5 MG: 5 TABLET, FILM COATED ORAL at 19:53

## 2022-01-01 RX ADMIN — ATORVASTATIN CALCIUM 40 MG: 40 TABLET, FILM COATED ORAL at 08:49

## 2022-01-01 RX ADMIN — INSULIN GLARGINE 10 UNITS: 100 INJECTION, SOLUTION SUBCUTANEOUS at 20:54

## 2022-01-01 RX ADMIN — DOCUSATE SODIUM 100 MG: 100 CAPSULE, LIQUID FILLED ORAL at 08:15

## 2022-01-01 RX ADMIN — HYDROMORPHONE HYDROCHLORIDE 0.25 MG: 1 INJECTION, SOLUTION INTRAMUSCULAR; INTRAVENOUS; SUBCUTANEOUS at 03:49

## 2022-01-01 RX ADMIN — BENZONATATE 100 MG: 100 CAPSULE ORAL at 19:26

## 2022-01-01 RX ADMIN — HYDROMORPHONE HYDROCHLORIDE 0.5 MG: 1 INJECTION, SOLUTION INTRAMUSCULAR; INTRAVENOUS; SUBCUTANEOUS at 22:28

## 2022-01-01 RX ADMIN — Medication 3 ML: at 21:29

## 2022-01-01 RX ADMIN — RANOLAZINE 500 MG: 500 TABLET, FILM COATED, EXTENDED RELEASE ORAL at 08:02

## 2022-01-01 RX ADMIN — ASPIRIN 81 MG: 81 TABLET, COATED ORAL at 08:53

## 2022-01-01 RX ADMIN — CEFTRIAXONE SODIUM 2 G: 2 INJECTION, POWDER, FOR SOLUTION INTRAMUSCULAR; INTRAVENOUS at 17:50

## 2022-01-01 RX ADMIN — FUROSEMIDE 40 MG: 40 TABLET ORAL at 11:23

## 2022-01-01 RX ADMIN — INSULIN LISPRO 7 UNITS: 100 INJECTION, SOLUTION INTRAVENOUS; SUBCUTANEOUS at 16:16

## 2022-01-01 RX ADMIN — HYDROMORPHONE HYDROCHLORIDE 0.25 MG: 1 INJECTION, SOLUTION INTRAMUSCULAR; INTRAVENOUS; SUBCUTANEOUS at 08:47

## 2022-01-01 RX ADMIN — CEFTRIAXONE SODIUM 2 G: 2 INJECTION, POWDER, FOR SOLUTION INTRAMUSCULAR; INTRAVENOUS at 17:14

## 2022-01-01 RX ADMIN — IPRATROPIUM BROMIDE AND ALBUTEROL SULFATE 3 ML: .5; 3 SOLUTION RESPIRATORY (INHALATION) at 18:31

## 2022-01-11 ENCOUNTER — APPOINTMENT (OUTPATIENT)
Dept: WOUND CARE | Facility: HOSPITAL | Age: 77
End: 2022-01-11

## 2022-01-18 ENCOUNTER — OFFICE VISIT (OUTPATIENT)
Dept: WOUND CARE | Facility: HOSPITAL | Age: 77
End: 2022-01-18

## 2022-01-18 PROCEDURE — G0463 HOSPITAL OUTPT CLINIC VISIT: HCPCS

## 2022-01-28 ENCOUNTER — OFFICE VISIT (OUTPATIENT)
Dept: WOUND CARE | Facility: HOSPITAL | Age: 77
End: 2022-01-28

## 2022-01-28 PROCEDURE — G0463 HOSPITAL OUTPT CLINIC VISIT: HCPCS

## 2022-02-01 ENCOUNTER — OFFICE VISIT (OUTPATIENT)
Dept: PAIN MEDICINE | Facility: CLINIC | Age: 77
End: 2022-02-01

## 2022-02-01 VITALS
BODY MASS INDEX: 39.55 KG/M2 | SYSTOLIC BLOOD PRESSURE: 131 MMHG | OXYGEN SATURATION: 96 % | HEIGHT: 72 IN | HEART RATE: 104 BPM | WEIGHT: 292 LBS | DIASTOLIC BLOOD PRESSURE: 73 MMHG | RESPIRATION RATE: 16 BRPM

## 2022-02-01 DIAGNOSIS — M51.36 DEGENERATION OF LUMBAR INTERVERTEBRAL DISC: ICD-10-CM

## 2022-02-01 DIAGNOSIS — M51.36 DDD (DEGENERATIVE DISC DISEASE), LUMBAR: ICD-10-CM

## 2022-02-01 DIAGNOSIS — M47.817 SPONDYLOSIS OF LUMBOSACRAL REGION WITHOUT MYELOPATHY OR RADICULOPATHY: ICD-10-CM

## 2022-02-01 DIAGNOSIS — M54.50 CHRONIC MIDLINE LOW BACK PAIN WITHOUT SCIATICA: Primary | ICD-10-CM

## 2022-02-01 DIAGNOSIS — M54.16 LUMBAR RADICULOPATHY: ICD-10-CM

## 2022-02-01 DIAGNOSIS — G89.29 CHRONIC MIDLINE LOW BACK PAIN WITHOUT SCIATICA: Primary | ICD-10-CM

## 2022-02-01 PROCEDURE — 99213 OFFICE O/P EST LOW 20 MIN: CPT | Performed by: PHYSICAL MEDICINE & REHABILITATION

## 2022-02-01 RX ORDER — OXYCODONE AND ACETAMINOPHEN 10; 325 MG/1; MG/1
1 TABLET ORAL EVERY 6 HOURS PRN
Qty: 120 TABLET | Refills: 0 | Status: SHIPPED | OUTPATIENT
Start: 2022-02-01 | End: 2022-02-23 | Stop reason: SDUPTHER

## 2022-02-01 RX ORDER — OXYCODONE AND ACETAMINOPHEN 10; 325 MG/1; MG/1
1 TABLET ORAL EVERY 6 HOURS PRN
Qty: 120 TABLET | Refills: 0 | Status: ON HOLD | OUTPATIENT
Start: 2022-02-01 | End: 2022-01-01 | Stop reason: SDUPTHER

## 2022-02-01 RX ORDER — OXYCODONE AND ACETAMINOPHEN 10; 325 MG/1; MG/1
1 TABLET ORAL EVERY 6 HOURS PRN
Qty: 120 TABLET | Refills: 0 | Status: SHIPPED | OUTPATIENT
Start: 2022-02-01 | End: 2022-01-01 | Stop reason: SDUPTHER
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
## 2022-02-11 ENCOUNTER — OFFICE VISIT (OUTPATIENT)
Dept: WOUND CARE | Facility: HOSPITAL | Age: 77
End: 2022-02-11

## 2022-02-11 PROCEDURE — G0463 HOSPITAL OUTPT CLINIC VISIT: HCPCS

## 2022-02-23 ENCOUNTER — CLINICAL SUPPORT NO REQUIREMENTS (OUTPATIENT)
Dept: CARDIOLOGY | Facility: CLINIC | Age: 77
End: 2022-02-23

## 2022-02-23 ENCOUNTER — OFFICE VISIT (OUTPATIENT)
Dept: CARDIOLOGY | Facility: CLINIC | Age: 77
End: 2022-02-23

## 2022-02-23 VITALS
HEART RATE: 80 BPM | DIASTOLIC BLOOD PRESSURE: 71 MMHG | OXYGEN SATURATION: 96 % | WEIGHT: 286 LBS | SYSTOLIC BLOOD PRESSURE: 111 MMHG | HEIGHT: 72 IN | BODY MASS INDEX: 38.74 KG/M2

## 2022-02-23 DIAGNOSIS — Z95.820 STATUS POST ANGIOPLASTY WITH STENT: ICD-10-CM

## 2022-02-23 DIAGNOSIS — I10 ESSENTIAL HYPERTENSION: ICD-10-CM

## 2022-02-23 DIAGNOSIS — I48.21 PERMANENT ATRIAL FIBRILLATION: ICD-10-CM

## 2022-02-23 DIAGNOSIS — Z95.0 PRESENCE OF CARDIAC PACEMAKER: Primary | ICD-10-CM

## 2022-02-23 DIAGNOSIS — Z79.01 CHRONIC ANTICOAGULATION: ICD-10-CM

## 2022-02-23 DIAGNOSIS — I44.2 AV BLOCK, COMPLETE: ICD-10-CM

## 2022-02-23 DIAGNOSIS — E78.5 DYSLIPIDEMIA: ICD-10-CM

## 2022-02-23 PROCEDURE — 99214 OFFICE O/P EST MOD 30 MIN: CPT | Performed by: INTERNAL MEDICINE

## 2022-02-23 PROCEDURE — 93279 PRGRMG DEV EVAL PM/LDLS PM: CPT | Performed by: INTERNAL MEDICINE

## 2022-02-23 NOTE — PROGRESS NOTES
Encounter Date:02/23/2022  Last seen 11/23/2020 from      Patient ID: Ro Nathan is a 76 y.o. male.    Chief Complaint:  Status post stent  Status post pacemaker  Chronic atrial fibrillation  Anticoagulation management        History of present illness  Patient has off-and-on chest discomfort and he takes probably 4-4 nitroglycerin a month.  Has continued shortness of breath.    Since I have last seen, the patient has been without any palpitations, dizziness or syncope.  Denies having any headache ,abdominal pain ,nausea, vomiting , diarrhea constipation, loss of weight or loss of appetite.  Denies having any excessive bruising ,hematuria or blood in the stool.    Review of all systems negative except as indicated.    Reviewed ROS.    Assessment and plan  ]]]]]]]]]]]]]]]]]]]]]  Impression  ========  -Exertional and nonexertional chest discomfort suggestive of angina pectoris.     -Status post stent placement   Status post stent to proximal LAD 12/5/2019.  Patient had stent to circumflex and RCA in the past.     -Significant mitral regurgitation (EMBER (10/18/2021     -Significant coronary artery disease involving ostial circumflex coronary artery and ramus intermedius as well as RCA.  Surgery was thought to be high risk and surgery was not offered by cardiovascular surgeons.  Intervention was not favorable due to ostial disease in the circumflex and ramus intermedius.     Cardiac catheterization (right and left heart and EMBER (10/18/2021) reveale.)  Left ventricular size and contractility is normal with ejection fraction of 60%. 2+ mitral regurgitation is present.  Mild to moderate pulmonary hypertension  Left main coronary artery is normal.  Left anterior descending artery is a lengthy stent in the proximal segment and is patent.  Ramus intermedius has proximal 99% disease.  Circumflex coronary artery is a large and dominant vessel that has ostial 80 to 90% disease.  It provided multiple marginal branches and  PDA.  First marginal branch has mid segment and 99% disease.  PDA is a large vessel that has proximal lengthy 90% disease.  Right coronary artery is a nondominant vessel that has 95% disease in the proximal segment within the stent.     EMBER 10/18/2021 reviewed  Significant mitral regurgitation central jet traversing to the lateral wall and superiorly and into the pulmonary vein.  Mild tricuspid regurgitation.  Calculated pulmonary artery pressure is 40 mmHg  Left atrial enlargement.  Left atrial appendage is normal in size without clot.  Left ventricle is normal in size and contractility with ejection fraction of 60%.     Cardiac catheterization 12/5/2019 by Dr. Munson  Left ventriculography  The overall estimated left ventricular ejection fraction was 65%.  Native coronary arteriography: Left dominant circulation     1.  Left main coronary arteries a large-caliber vessel gives rise left anterior descending left circumflex flex arteries.  There is an ostial eccentric 40% lesion that appears angiographically to approach 50%. No significant coronary atherosclerotic disease present.  2.  Left anterior descending artery is a large-caliber vessel that gives rise to large caliber diagonal branches and courses along the anterior interventricular groove and wraps around the apex.  There is a proximal eccentric 50% lesion within an in-stent restenotic segment followed by an greater than 80% focal eccentric in-stent restenotic lesion followed by a 60% lesion after the stented segment and otherwise no coronary atherosclerotic disease of any significance present.  3.  The left circumflex arteries a large-caliber vessel gives rise to large caliber bifurcating obtuse marginal branch.  There is an ostial proximal concentric 50% lesion that is calcified that immediately gives rise to a very high first obtuse marginal branch that is best classified as a ramus intermedius branch supplying large territory in the anterolateral wall  that has an ostial proximal 70% lesion.  The calcified continuing circumflex and the posterior AV groove gives a second obtuse marginal branch that has mild diffuse disease, a third obtuse marginal branch and then a bifurcating system that has a bifurcation stenting within it before giving rise to the left PDA.  There is diffuse 50% in-stent restenosis within this bifurcation stented segment into the obtuse marginal branch which itself has diffuse 50 to 60% disease within the stented segment.  There is a concentric 80% lesion in the circumflex PDA.  No significant coronary atherosclerotic disease present  4.  The right coronary arteries a large-caliber nondominant vessel has a stented mid segment after which there several acute marginal branches there is 50% in-stent restenosis approaching 60% at some portions within this nondominant right proximal to mid proximal segment.     Conclusions:     1.  Normal left heart filling pressures with preserved LV systolic function  2.  Normal epicardial anatomy with severe two-vessel coronary artery disease involving what is likely the culprit for the patient's chest pain syndrome, namely the very severe proximal LAD lesions.  We will treat the remaining circumflex lesions with medical therapy and see if the patient's symptoms resolved; this includes the severe lesion within the proximal portion of the high obtuse marginal branch within the circumflex system as well.     -Status post permanent ventricular pacemaker implantation ([employer] 1/13/2015)  Battery status is 4.5 years.     -History of fever and sepsis.  Positive blood cultures for coagulase-negative staph.      EMBER 7/22/2021 revealed  No evidence for valvular vegetations is present.  Significant mitral regurgitation with central jet extending to the lateral wall and into the superior aspect of the left atrium.  Mild tricuspid regurgitation.  Calculated pulmonary artery pressure is 25 mmHg.  Left atrial and left atrial  appendage enlargement is present without clot.  Normal left ventricle size and contractility with ejection fraction of 60%.     -Dyslipidemia diabetes hypertension COPD CKD 3.  History of cirrhosis     -Chronic atrial fibrillation     Anticoagulation-on Eliquis     -History of right foot ulceration with bone involvement.     -Peripheral vascular disease     -Large abdominal ventral hernia.     -Status post appendectomy appendectomy cholecystectomy left elbow surgery hernia repair     -Allergic to morphine pantoprazole haloperidol  ===========  Plan  ========  Significant coronary artery disease  Patient is having intermittent chest discomfort and shortness of breath.  Probably due to presence of significant coronary artery disease and mitral regurgitation.    Cardiac catheterization 10/19/2021 revealed severe coronary artery disease.  Patient was referred for CABG and mitral valve surgery     Have discussed with Dr. Antoine regarding CABG and mitral valve surgery.  Patient was thought to be high risk with significant comorbidities especially risk for infection.  Essentially patient was declined for surgical intervention.     Have discussed with interventionist Dr. Norwood regarding interventional options.  Significant coronary artery disease involving ostial circumflex coronary artery and ramus intermedius as well as RCA.  Intervention was not favorable due to ostial disease in the circumflex and ramus intermedius.     Meanwhile maximize medical treatment.  Continue metoprolol and Imdur.  Patient is on Ranexa and aspirin Imdur metoprolol  If blood pressure is a concern losartan dose can be reduced or discontinued.  Blood pressure today 110/70     Renal dysfunction  Renal consultation and follow-up appreciated.  18/1.10-10/19/2021  22/1.3-10/20/2021     Status post pacemaker.  Interrogation of the pacemaker revealed excellent pacing parameters.  Pacemaker site looks normal.  Battery status 3.5 years.    Chronic atrial  fibrillation.-Rate controlled     Anticoagulation was reviewed.  Patient was on Eliquis      Hypertension-110/70     Dyslipidemia-continue atorvastatin     Follow-up in office in 6 months with pacemaker interrogation.     Further plan will depend on patient's progress.     ]]]]]]]]]]]]]]]]]]]]]                  Diagnosis Plan   1. Presence of cardiac pacemaker     2. AV block, complete (HCC)     3. Essential hypertension     4. Dyslipidemia     5. Status post angioplasty with stent     6. Chronic anticoagulation     7. Permanent atrial fibrillation (HCC)     LAB RESULTS (LAST 7 DAYS)    CBC        BMP        CMP         BNP        TROPONIN        CoAg        Creatinine Clearance  CrCl cannot be calculated (Patient's most recent lab result is older than the maximum 30 days allowed.).    ABG        Radiology  No radiology results for the last day                The following portions of the patient's history were reviewed and updated as appropriate: allergies, current medications, past family history, past medical history, past social history, past surgical history and problem list.    Review of Systems   Constitutional: Negative for malaise/fatigue.   Cardiovascular: Positive for leg swelling. Negative for chest pain, palpitations and syncope.   Respiratory: Positive for shortness of breath.    Skin: Negative for rash.   Gastrointestinal: Negative for nausea and vomiting.   Neurological: Negative for dizziness, light-headedness and numbness.   All other systems reviewed and are negative.        Current Outpatient Medications:   •  apixaban (ELIQUIS) 5 MG tablet tablet, Take 5 mg by mouth 2 (Two) Times a Day., Disp: , Rfl:   •  aspirin 81 MG EC tablet, Take 1 tablet by mouth Daily., Disp: 30 tablet, Rfl: 5  •  atorvastatin (LIPITOR) 40 MG tablet, Take 40 mg by mouth Every Morning., Disp: , Rfl:   •  benzonatate (TESSALON) 100 MG capsule, , Disp: , Rfl:   •  budesonide (PULMICORT) 0.5 MG/2ML nebulizer solution, Take  0.5 mg by nebulization 2 (Two) Times a Day As Needed., Disp: , Rfl:   •  furosemide (LASIX) 20 MG tablet, Take 20 mg by mouth Every Morning., Disp: , Rfl:   •  ipratropium-albuterol (DUO-NEB) 0.5-2.5 mg/3 ml nebulizer, 3 mL 4 (Four) Times a Day As Needed., Disp: , Rfl:   •  isosorbide mononitrate (IMDUR) 60 MG 24 hr tablet, Take 1 tablet by mouth Daily., Disp: 30 tablet, Rfl: 3  •  metOLazone (ZAROXOLYN) 5 MG tablet, Take 5 mg by mouth Take As Directed. 3 times every week, Disp: , Rfl:   •  metoprolol tartrate (LOPRESSOR) 25 MG tablet, Take 25 mg by mouth 2 (Two) Times a Day., Disp: , Rfl:   •  miconazole (MICOTIN) 2 % cream, Apply 1 application topically to the appropriate area as directed Every 12 (Twelve) Hours., Disp: 60 g, Rfl: 1  •  mirtazapine (REMERON) 30 MG tablet, Take 30 mg by mouth Every Night., Disp: , Rfl:   •  nitroglycerin (NITROSTAT) 0.4 MG SL tablet, Place 0.4 mg under the tongue Every 5 (Five) Minutes As Needed for Chest Pain., Disp: , Rfl:   •  oxyCODONE-acetaminophen (Percocet)  MG per tablet, Take 1 tablet by mouth Every 6 (Six) Hours As Needed for Moderate Pain ., Disp: 120 tablet, Rfl: 0  •  oxyCODONE-acetaminophen (Percocet)  MG per tablet, Take 1 tablet by mouth Every 6 (Six) Hours As Needed for Moderate Pain ., Disp: 120 tablet, Rfl: 0  •  Perforomist 20 MCG/2ML nebulizer solution, 20 mcg 2 (Two) Times a Day As Needed., Disp: , Rfl:   •  ranolazine (RANEXA) 1000 MG 12 hr tablet, Take 1,000 mg by mouth 2 (Two) Times a Day., Disp: , Rfl:   •  rOPINIRole (REQUIP) 0.5 MG tablet, Take 0.5 mg by mouth Every Night., Disp: , Rfl:   •  traZODone (DESYREL) 100 MG tablet, Take 100 mg by mouth Every Night., Disp: , Rfl:   •  VENTOLIN  (90 Base) MCG/ACT inhaler, Inhale 2 puffs Every 6 (Six) Hours As Needed for Wheezing or Shortness of Air., Disp: , Rfl:   •  pregabalin (LYRICA) 150 MG capsule, TAKE ONE CAPSULE BY MOUTH TWICE A DAY, Disp: 60 capsule, Rfl: 2    Allergies   Allergen  Reactions   • Haloperidol Hives   • Morphine Itching   • Pantoprazole Other (See Comments)     Feels sick after receiving   • Latex Unknown - High Severity       Family History   Problem Relation Age of Onset   • Alzheimer's disease Mother    • GI problems Father    • Heart disease Father        Past Surgical History:   Procedure Laterality Date   • ABDOMINAL SURGERY     • APPENDECTOMY     • BONE CURETTAGE Right 1/22/2021    Procedure: Wound excision with fifth metatarsal head resection, right foot.;  Surgeon: Josef Egan DPM;  Location: [facility] MAIN OR;  Service: Podiatry;  Laterality: Right;   • BONE INCISION AND DRAINAGE Right 10/5/2020    Procedure: Incision and drainage with debridement of all nonviable soft tissue and bone with bone biopsy, right foot.;  Surgeon: Josef Egan DPM;  Location: [facility] MAIN OR;  Service: Podiatry;  Laterality: Right;   • CARDIAC CATHETERIZATION N/A 12/5/2019    Procedure: Left Heart Cath;  Surgeon: Mirza Munson MD;  Location: [facility] CATH INVASIVE LOCATION;  Service: Cardiology   • CARDIAC CATHETERIZATION N/A 12/5/2019    Procedure: Stent MICHAEL coronary;  Surgeon: Mirza Munson MD;  Location: [facility] CATH INVASIVE LOCATION;  Service: Cardiology   • CARDIAC CATHETERIZATION N/A 10/18/2021    Procedure: Left Heart Cath and coronary angiogram;  Surgeon: Celine Swanson MD;  Location: [facility] CATH INVASIVE LOCATION;  Service: Cardiovascular;  Laterality: N/A;   • CARDIAC CATHETERIZATION N/A 10/18/2021    Procedure: Right Heart Cath;  Surgeon: Celine Swanson MD;  Location: [facility] CATH INVASIVE LOCATION;  Service: Cardiovascular;  Laterality: N/A;   • CHOLECYSTECTOMY     • ELBOW PROCEDURE      left   • FOOT SURGERY      right foot   • HERNIA REPAIR     • INCISION AND DRAINAGE OF WOUND Right 4/6/2021    Procedure: INCISION AND DRAINAGE OF RIGHT FOOT 5TH METATARSAL TO BONE AND PARTIAL 5TH METATARSAL RESECTION;  Surgeon: Josef Egan DPM;   Location: [facility] MAIN OR;  Service: Podiatry;  Laterality: Right;   • INCISION AND DRAINAGE OF WOUND Right 4/8/2021    Procedure: INCISION AND DRAINAGE BONE, DELAYED PRIMARY CLOSURE;  Surgeon: Josef Egan DPM;  Location: [facility] MAIN OR;  Service: Podiatry;  Laterality: Right;   • INTERVENTIONAL RADIOLOGY PROCEDURE N/A 12/5/2019    Procedure: Intravascular Ultrasound;  Surgeon: Mirza Munson MD;  Location: [facility] CATH INVASIVE LOCATION;  Service: Cardiology   • PACEMAKER IMPLANTATION     • [place] RT/LT HEART CATHETERS N/A 12/5/2019    Procedure: Percutaneous Coronary Intervention;  Surgeon: Mirza Munson MD;  Location: [facility] CATH INVASIVE LOCATION;  Service: Cardiology   • WOUND DEBRIDEMENT Right 10/29/2020    Procedure: Preparation and debridement of foot wound with application of Integra wound graft, right foot.;  Surgeon: Josef Egan DPM;  Location: [facility] MAIN OR;  Service: Podiatry;  Laterality: Right;   • WOUND DEBRIDEMENT N/A 7/29/2021    Procedure: EXCISIONAL ABDOMINAL WOUND  DEBRIDEMENT;  Surgeon: Cleopatra Wang MD;  Location: [facility] MAIN OR;  Service: General;  Laterality: N/A;   • WOUND DEBRIDEMENT N/A 8/1/2021    Procedure: DEBRIDEMENT OF ABDOMINAL WALL;  Surgeon: Hill Bhatia DO;  Location: [facility] MAIN OR;  Service: General;  Laterality: N/A;       Past Medical History:   Diagnosis Date   • Alcoholic cirrhosis of liver with ascites ([facility]) 10/26/2020   • Benign hypertension with CKD (chronic kidney disease) stage III ([facility]) 10/26/2020   • Bruises easily    • CHF (congestive heart failure) ([facility])    • Chronic bronchitis with COPD (chronic obstructive pulmonary disease) ([facility]) 10/26/2020   • Chronic respiratory failure with hypoxia ([facility]) 10/26/2020   • Congenital heart defect    • Difficulty attaining erection    • Heart block    • Hernia of abdominal wall    • Hypertension    • Low back pain    • Pacemaker    • Peripheral vascular disease due to secondary diabetes ([facility])  "10/26/2020   • Poor circulation    • Prostate cancer (HCC)     prostate   • Shortness of breath    • Sleep apnea     using CPAP    • Stage 3a chronic kidney disease (HCC) 10/26/2020   • Stented coronary artery 2019    MICHAEL to LAD   • Type 2 diabetes, controlled, with neuropathy (HCC) 10/26/2020    no meds   • Type 2 diabetes, controlled, with neuropathy ([facility]) 10/26/2020       Family History   Problem Relation Age of Onset   • Alzheimer's disease Mother    • GI problems Father    • Heart disease Father        Social History     Socioeconomic History   • Marital status:    Tobacco Use   • Smoking status: Former Smoker     Years: 15.00     Types: Cigarettes     Start date: 1966     Quit date: 5/10/2020     Years since quittin.7   • Smokeless tobacco: Never Used   Vaping Use   • Vaping Use: Never used   Substance and Sexual Activity   • Alcohol use: Yes     Alcohol/week: 4.0 standard drinks     Types: 4 Shots of liquor per week     Comment: maybe 4 shots per month   • Drug use: No   • Sexual activity: Defer         Procedures      Objective:       Physical Exam    /71   Pulse 80   Ht 182.9 cm (72\")   Wt 130 kg (286 lb)   SpO2 96%   BMI 38.79 kg/m²   The patient is alert, oriented and in no distress.    Vital signs as noted above.    Head and neck revealed no carotid bruits or jugular venous distension.  No thyromegaly or lymphadenopathy is present.    Lungs clear.  No wheezing.  Breath sounds are normal bilaterally.    Heart normal first and second heart sounds.  No murmur..  No pericardial rub is present.  No gallop is present.    Abdomen soft and nontender.  No organomegaly is present.  Significant abdominal obesity.    Extremities revealed good peripheral pulses without any pedal edema.    Skin warm and dry.  Pacemaker site looks normal.    Musculoskeletal system is grossly normal.    CNS grossly normal.    Reviewed and unchanged from last visit.        "

## 2022-02-25 ENCOUNTER — OFFICE VISIT (OUTPATIENT)
Dept: WOUND CARE | Facility: HOSPITAL | Age: 77
End: 2022-02-25

## 2022-02-25 PROCEDURE — G0463 HOSPITAL OUTPT CLINIC VISIT: HCPCS

## 2022-03-11 ENCOUNTER — OFFICE VISIT (OUTPATIENT)
Dept: WOUND CARE | Facility: HOSPITAL | Age: 77
End: 2022-03-11

## 2022-03-11 ENCOUNTER — LAB REQUISITION (OUTPATIENT)
Dept: LAB | Facility: HOSPITAL | Age: 77
End: 2022-03-11

## 2022-03-11 DIAGNOSIS — T81.31XA DISRUPTION OF EXTERNAL OPERATION (SURGICAL) WOUND, NOT ELSEWHERE CLASSIFIED, INITIAL ENCOUNTER: ICD-10-CM

## 2022-03-11 PROCEDURE — 87070 CULTURE OTHR SPECIMN AEROBIC: CPT | Performed by: SURGERY

## 2022-03-11 PROCEDURE — 87186 SC STD MICRODIL/AGAR DIL: CPT | Performed by: SURGERY

## 2022-03-11 PROCEDURE — 87205 SMEAR GRAM STAIN: CPT | Performed by: SURGERY

## 2022-04-19 PROBLEM — J18.9 PNEUMONIA DUE TO INFECTIOUS ORGANISM, UNSPECIFIED LATERALITY, UNSPECIFIED PART OF LUNG: Status: ACTIVE | Noted: 2022-01-01

## 2022-04-19 PROBLEM — T81.89XA NON-HEALING SURGICAL WOUND: Status: ACTIVE | Noted: 2022-01-01

## 2022-04-22 NOTE — ANESTHESIA PREPROCEDURE EVALUATION
Anesthesia Evaluation     Patient summary reviewed and Nursing notes reviewed   no history of anesthetic complications:  NPO Solid Status: > 8 hours  NPO Liquid Status: > 8 hours           Airway   Dental      Pulmonary    (+) pneumonia , a smoker Former, COPD, shortness of breath, sleep apnea on CPAP,   Cardiovascular     ECG reviewed  PT is on anticoagulation therapy  Patient on routine beta blocker    (+) pacemaker pacemaker, hypertension, valvular problems/murmurs MR and TI, CAD, cardiac stents dysrhythmias Atrial Fib, Atrial Flutter, angina with exertion, CHF Systolic <55%, GIORDANO, PVD, hyperlipidemia,       Neuro/Psych  (+) numbness,    GI/Hepatic/Renal/Endo    (+)  hiatal hernia, GERD,  liver disease cirrhosis, renal disease CRI, diabetes mellitus,     Musculoskeletal     (+) back pain, chronic pain, radiculopathy  Abdominal    Substance History   (+) alcohol use,      OB/GYN          Other   arthritis, blood dyscrasia anemia,   history of cancer    ROS/Med Hx Other: Hyperglycemia, heart block, prostate cancer, alcoholic liver cirrhosis, ascites, chronic bronchitis, skin ulcer, DDD, spondylosis, h/o SBO/adhesions, abdominal pain, diabetic foot ulcer, ventral hernia, chronic respiratory failure with hypoxemia, sepsis      Echo  · Estimated left ventricular EF = 40% Left ventricular systolic function is mildly decreased.     Indications  Heart failure     Technically satisfactory study.  Mitral valve is structurally normal.  Moderate mitral regurgitation is present.  Tricuspid valve is structurally normal.  Moderate tricuspid regurgitation.  Calculated pulmonary artery pressure is 40 mmHg.  Aortic valve is structurally normal.  Pulmonic valve could not be well visualized.  No evidence for aortic regurgitation is seen by Doppler study.  Biatrial enlargement.  Left ventricle is normal in size and septal apical hypokinesis with ejection fraction of 40%.  Right ventricle is normal in size.  Atrial septum is  intact.  Aorta is normal.  No pericardial effusion or intracardiac thrombus is seen.     Impression  Moderate mitral and tricuspid regurgitation.  Biatrial enlargement.  Calculated pulmonary artery pressure is 40 mmHg.  Left ventricular apical and septal hypokinesis with ejection fraction of 40%.    Stress  Lexiscan Cardiolite test is abnormal with inferior and apical ischemia.     Gated SPECT images revealed normal left ventricular size and contractility with ejection fraction of 55%.      Cath  FINDINGS:     1. HEMODYNAMICS:  Left ventricle end-diastolic pressure is normal. No gradient was noted across aortic valve.  Mean right atrial pressure 8 right ventricle 35/8 pulmonary artery 46/15 mean pulmonary artery 26 pulmonary capital wedge pressure is 17.     2. LEFT VENTRICULOGRAPHY:  Left ventricular size and contractility is normal with ejection fraction of 60%. 2+ mitral regurgitation is present.     3. CORONARY ANGIOGRAPHY:  Left main coronary artery is normal.  Left anterior descending artery is a lengthy stent in the proximal segment and is patent.  Ramus intermedius has proximal 99% disease.  Circumflex coronary artery is a large and dominant vessel that has ostial 80 to 90% disease.  It provided multiple marginal branches and PDA.  First marginal branch has mid segment and 99% disease.  PDA is a large vessel that has proximal lengthy 90% disease.  Right coronary artery is a nondominant vessel that has 95% disease in the proximal segment within the stent.     SUMMARY:      Left ventricular size and contractility is normal with ejection fraction of 60%. 2+ mitral regurgitation is present.     3. CORONARY ANGIOGRAPHY:  Left main coronary artery is normal.  Left anterior descending artery is a lengthy stent in the proximal segment and is patent.  Ramus intermedius has proximal 99% disease.  Circumflex coronary artery is a large and dominant vessel that has ostial 80 to 90% disease.  It provided multiple marginal  branches and PDA.  First marginal branch has mid segment and 99% disease.  PDA is a large vessel that has proximal lengthy 90% disease.  Right coronary artery is a nondominant vessel that has 95% disease in the proximal segment within the stent.     RECOMMENDATIONS:  Patient would benefit from CABG and mitral valve repair versus replacement.  Cardiovascular surgery consultation initiated.      PSH  HERNIA REPAIR APPENDECTOMY  CHOLECYSTECTOMY ELBOW PROCEDURE  SC RT/LT HEART CATHETERS CARDIAC CATHETERIZATION  INTERVENTIONAL RADIOLOGY PROCEDURE CARDIAC CATHETERIZATION  BONE INCISION AND DRAINAGE FOOT SURGERY  PACEMAKER IMPLANTATION ABDOMINAL SURGERY  WOUND DEBRIDEMENT BONE CURETTAGE  INCISION AND DRAINAGE OF WOUND INCISION AND DRAINAGE OF WOUND  WOUND DEBRIDEMENT WOUND DEBRIDEMENT  CARDIAC CATHETERIZATION CARDIAC CATHETERIZATION                  Anesthesia Plan    ASA 4     MAC   (Patient identified; pre-operative vital signs, all relevant labs/studies, complete medical/surgical/anesthetic history, full medication list, full allergy list, and NPO status obtained/reviewed; physical assessment performed; anesthetic options, side effects, potential complications, risks, and benefits discussed; questions answered; written anesthesia consent obtained; patient cleared for procedure; anesthesia machine and equipment checked and functioning)    Anesthetic plan, all risks, benefits, and alternatives have been provided, discussed and informed consent has been obtained with: patient.    Plan discussed with CRNA and CAA.        CODE STATUS:    Level Of Support Discussed With: Patient  Code Status (Patient has no pulse and is not breathing): CPR (Attempt to Resuscitate)  Medical Interventions (Patient has pulse or is breathing): Full Support

## 2022-04-23 NOTE — ANESTHESIA POSTPROCEDURE EVALUATION
Patient: Ro Nathan    Procedure Summary     Date: 04/23/22 Room / Location: [facility] ENDOSCOPY 1 / [facility] ENDOSCOPY    Anesthesia Start: 0825 Anesthesia Stop: 0847    Procedure: BRONCHOSCOPY with bronchioalveolar lavage of right lower lobe (N/A Bronchus) Diagnosis:       Pneumonia due to infectious organism, unspecified laterality, unspecified part of lung      (Pneumonia due to infectious organism, unspecified laterality, unspecified part of lung [J18.9])    Surgeons: Edwin Kline MD Provider: Rodolfo Robles MD    Anesthesia Type: MAC ASA Status: 4          Anesthesia Type: MAC    Vitals  Vitals Value Taken Time   /68 04/23/22 0906   Temp 96.8 °F (36 °C) 04/23/22 0851   Pulse 72 04/23/22 0911   Resp 20 04/23/22 0906   SpO2 96 % 04/23/22 0911   Vitals shown include unvalidated device data.        Post Anesthesia Care and Evaluation    Patient location during evaluation: PACU  Patient participation: complete - patient participated  Level of consciousness: awake  Pain scale: See nurse's notes for pain score.  Pain management: adequate  Airway patency: patent  Anesthetic complications: No anesthetic complications  PONV Status: none  Cardiovascular status: acceptable  Respiratory status: acceptable  Hydration status: acceptable    Comments: Patient seen and examined postoperatively; vital signs stable; SpO2 greater than or equal to 90%; cardiopulmonary status stable; nausea/vomiting adequately controlled; pain adequately controlled; no apparent anesthesia complications; patient discharged from anesthesia care when discharge criteria were met

## 2022-04-27 PROBLEM — I50.22 CHRONIC SYSTOLIC HEART FAILURE: Status: ACTIVE | Noted: 2022-01-01

## 2022-04-27 PROBLEM — Z79.51 LONG TERM (CURRENT) USE OF INHALED STEROIDS: Status: ACTIVE | Noted: 2022-01-01

## 2022-04-27 PROBLEM — Z87.891 PERSONAL HISTORY OF NICOTINE DEPENDENCE: Status: ACTIVE | Noted: 2021-10-23
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
## 2022-04-28 NOTE — OUTREACH NOTE
Prep Survey    Flowsheet Row Responses   [facility] facility patient discharged from? [facility]   Is LACE score < 7 ? No   Emergency Room discharge w/ pulse ox? No   Eligibility Readm Mgmt   Discharge diagnosis  Pneunomia   Does the patient have one of the following disease processes/diagnoses(primary or secondary)? COPD/Pneumonia   Does the patient have Home health ordered? Yes   What is the Home health agency?  [facility]    Is there a DME ordered? No   Prep survey completed? Yes          MUNIRA VILLASENOR - Registered Nurse

## 2022-05-02 NOTE — OUTREACH NOTE
COPD/PN Week 1 Survey    Flowsheet Row Responses   [facility] facility patient discharged from? [facility]   Does the patient have one of the following disease processes/diagnoses(primary or secondary)? COPD/Pneumonia   Was the primary reason for admission: Pneumonia   Week 1 attempt successful? No   Unsuccessful attempts Attempt 1          MORENITA RIOS - Registered Nurse

## 2022-05-05 NOTE — OUTREACH NOTE
COPD/PN Week 2 Survey    Flowsheet Row Responses   [facility] facility patient discharged from? [facility]   Does the patient have one of the following disease processes/diagnoses(primary or secondary)? COPD/Pneumonia   Was the primary reason for admission: Pneumonia   Week 2 attempt successful? No   Unsuccessful attempts Attempt 1          VIANCA CAMPOS - Registered Nurse

## 2022-05-09 NOTE — OUTREACH NOTE
COPD/PN Week 2 Survey    Flowsheet Row Responses   [facility] patient discharged from? [facility]   Does the patient have one of the following disease processes/diagnoses(primary or secondary)? COPD/Pneumonia   Was the primary reason for admission: Pneumonia   Week 2 attempt successful? Yes   Call start time 1459   Call end time 1503   Discharge diagnosis  Pneunomia   Person spoke with today (if not patient) and relationship Meghan wife   Meds reviewed with patient/caregiver? Yes   Is the patient taking all medications as directed (includes completed medication regime)? Yes   Comments regarding appointments Pt was seen at [facility] 05/06   Does the patient have a primary care provider?  Yes   Comments regarding PCP Pt saw PCP's partner today   Has the patient kept scheduled appointments due by today? Yes   What is the Home health agency?  [facility]     Has home health visited the patient within 72 hours of discharge? Yes   Psychosocial issues? No   Comments Wound healing   Did the patient receive a copy of their discharge instructions? Yes   Nursing interventions Reviewed instructions with patient   What is the patient's perception of their health status since discharge? Improving   If the patient is a current smoker, are they able to teach back resources for cessation? Not a smoker   Is the patient able to teach back COPD zones? Yes   Patient reports what zone on this call? [place]   Week 2 call completed? Yes   Wrap up additional comments Wife reports patient was seen in office today and is resting at time of call.  His wound is healing.  he was seen at [facility] 3 days ago due to difficulty with breathing but states this has resolved.          ELAN MARTINI - Registered Nurse

## 2022-06-02 PROBLEM — R06.00 DYSPNEA, UNSPECIFIED TYPE: Status: ACTIVE | Noted: 2022-01-01

## 2022-06-02 NOTE — ED PROVIDER NOTES
Subjective   Patient is a 76-year-old male complaining of increasing shortness of breath over the past 24 hours.  He states that shortness of breath is mostly worse on exertion.  He also complains of being pale and sweaty at times.  He denies fever chest pain or other complaint.          Review of Systems   Constitutional: Negative for chills and fever.   HENT: Negative for congestion and sore throat.    Eyes: Negative.    Respiratory: Positive for shortness of breath. Negative for cough.    Cardiovascular: Negative for chest pain and palpitations.   Gastrointestinal: Negative for abdominal pain, diarrhea and vomiting.   Endocrine: Negative for polyuria.   Genitourinary: Negative for dysuria.   Musculoskeletal: Negative for back pain and neck pain.   Neurological: Negative for dizziness and headaches.   Psychiatric/Behavioral: Negative for confusion.     A complete review of system was obtained and is otherwise negative  Past Medical History:   Diagnosis Date   • Alcoholic cirrhosis of liver with ascites (Formerly [facility]) 10/26/2020   • Benign hypertension with CKD (chronic kidney disease) stage III (Formerly [facility]) 10/26/2020   • Bruises easily    • CHF (congestive heart failure) (Formerly [facility])    • Chronic bronchitis with COPD (chronic obstructive pulmonary disease) (Formerly [facility]) 10/26/2020   • Chronic respiratory failure with hypoxia (Formerly [facility]) 10/26/2020   • Congenital heart defect    • Difficulty attaining erection    • Heart block    • Hernia of abdominal wall    • Hypertension    • Low back pain    • Pacemaker    • Peripheral vascular disease due to secondary diabetes (Formerly [facility]) 10/26/2020   • Poor circulation    • Prostate cancer (Formerly [facility])     prostate   • Shortness of breath    • Sleep apnea     using CPAP    • Stage 3a chronic kidney disease (Formerly [facility]) 10/26/2020   • Stented coronary artery 12/05/2019    MICHAEL to LAD   • Type 2 diabetes, controlled, with neuropathy (Formerly [facility]) 10/26/2020    no meds   • Type 2 diabetes, controlled, with neuropathy (Formerly [facility]) 10/26/2020        Allergies   Allergen Reactions   • Haloperidol Hives   • Morphine Itching   • Pantoprazole Other (See Comments)     Feels sick after receiving   • Latex Unknown - High Severity       Past Surgical History:   Procedure Laterality Date   • ABDOMINAL SURGERY     • APPENDECTOMY     • BONE CURETTAGE Right 1/22/2021    Procedure: Wound excision with fifth metatarsal head resection, right foot.;  Surgeon: Josef gEan DPM;  Location: [facility] MAIN OR;  Service: Podiatry;  Laterality: Right;   • BONE INCISION AND DRAINAGE Right 10/5/2020    Procedure: Incision and drainage with debridement of all nonviable soft tissue and bone with bone biopsy, right foot.;  Surgeon: Josef Egan DPM;  Location: [facility] MAIN OR;  Service: Podiatry;  Laterality: Right;   • BRONCHOSCOPY N/A 4/23/2022    Procedure: BRONCHOSCOPY with bronchioalveolar lavage of right lower lobe;  Surgeon: Edwin Kline MD;  Location: [facility] ENDOSCOPY;  Service: Pulmonary;  Laterality: N/A;  pneumonia   • CARDIAC CATHETERIZATION N/A 12/5/2019    Procedure: Left Heart Cath;  Surgeon: Mirza Munson MD;  Location: [facility] CATH INVASIVE LOCATION;  Service: Cardiology   • CARDIAC CATHETERIZATION N/A 12/5/2019    Procedure: Stent MICHAEL coronary;  Surgeon: Mirza Munson MD;  Location: [facility] CATH INVASIVE LOCATION;  Service: Cardiology   • CARDIAC CATHETERIZATION N/A 10/18/2021    Procedure: Left Heart Cath and coronary angiogram;  Surgeon: Celine Swanson MD;  Location: [facility] CATH INVASIVE LOCATION;  Service: Cardiovascular;  Laterality: N/A;   • CARDIAC CATHETERIZATION N/A 10/18/2021    Procedure: Right Heart Cath;  Surgeon: Celine Swanson MD;  Location: [facility] CATH INVASIVE LOCATION;  Service: Cardiovascular;  Laterality: N/A;   • CHOLECYSTECTOMY     • ELBOW PROCEDURE      left   • FOOT SURGERY      right foot   • HERNIA REPAIR     • INCISION AND DRAINAGE OF WOUND Right 4/6/2021    Procedure: INCISION AND DRAINAGE OF RIGHT  FOOT 5TH METATARSAL TO BONE AND PARTIAL 5TH METATARSAL RESECTION;  Surgeon: Josef Egan DPM;  Location: [facility] MAIN OR;  Service: Podiatry;  Laterality: Right;   • INCISION AND DRAINAGE OF WOUND Right 2021    Procedure: INCISION AND DRAINAGE BONE, DELAYED PRIMARY CLOSURE;  Surgeon: Josef Egan DPM;  Location: [facility] MAIN OR;  Service: Podiatry;  Laterality: Right;   • INTERVENTIONAL RADIOLOGY PROCEDURE N/A 2019    Procedure: Intravascular Ultrasound;  Surgeon: Mirza Munson MD;  Location: [facility] CATH INVASIVE LOCATION;  Service: Cardiology   • PACEMAKER IMPLANTATION     • KY RT/LT HEART CATHETERS N/A 2019    Procedure: Percutaneous Coronary Intervention;  Surgeon: Mirza Munson MD;  Location: [facility] CATH INVASIVE LOCATION;  Service: Cardiology   • WOUND DEBRIDEMENT Right 10/29/2020    Procedure: Preparation and debridement of foot wound with application of Integra wound graft, right foot.;  Surgeon: Josef Egan DPM;  Location: [facility] MAIN OR;  Service: Podiatry;  Laterality: Right;   • WOUND DEBRIDEMENT N/A 2021    Procedure: EXCISIONAL ABDOMINAL WOUND  DEBRIDEMENT;  Surgeon: Cleopatra Wang MD;  Location: [facility] MAIN OR;  Service: General;  Laterality: N/A;   • WOUND DEBRIDEMENT N/A 2021    Procedure: DEBRIDEMENT OF ABDOMINAL WALL;  Surgeon: Hill Bhatia DO;  Location: [facility] MAIN OR;  Service: General;  Laterality: N/A;       Family History   Problem Relation Age of Onset   • Alzheimer's disease Mother    • GI problems Father    • Heart disease Father        Social History     Socioeconomic History   • Marital status:    Tobacco Use   • Smoking status: Former Smoker     Years: 15.00     Types: Cigarettes     Start date: 1966     Quit date: 5/10/2020     Years since quittin.0   • Smokeless tobacco: Never Used   Vaping Use   • Vaping Use: Never used   Substance and Sexual Activity   • Alcohol use: Yes     Alcohol/week: 4.0 standard  drinks     Types: 4 Shots of liquor per week     Comment: maybe 4 shots per month   • Drug use: No   • Sexual activity: Defer           Objective   Physical Exam  HEENT exam shows TMs to be clear.  Oropharynx comers but sclera is nonicteric.  Neck has no adenopathy JVD or bruits.  Lungs have expiratory wheezes in all lung fields.  Heart has regular rate rhythm without murmur rub or gallop.  Chest is nontender.  Abdomen is soft nontender extremities M is no cyanosis or edema.  Back has no CVA tenderness.  Procedures           ED Course            Results for orders placed or performed during the hospital encounter of 06/02/22   Basic Metabolic Panel    Specimen: Blood   Result Value Ref Range    Glucose 179 (H) 65 - 99 mg/dL    BUN 26 (H) 8 - 23 mg/dL    Creatinine 1.37 (H) 0.76 - 1.27 mg/dL    Sodium 134 (L) 136 - 145 mmol/L    Potassium 4.7 3.5 - 5.2 mmol/L    Chloride 96 (L) 98 - 107 mmol/L    CO2 25.0 22.0 - 29.0 mmol/L    Calcium 8.8 8.6 - 10.5 mg/dL    BUN/Creatinine Ratio 19.0 7.0 - 25.0    Anion Gap 13.0 5.0 - 15.0 mmol/L    eGFR 53.8 (L) >60.0 mL/min/1.73   CBC Auto Differential    Specimen: Blood   Result Value Ref Range    WBC 14.30 (H) 3.40 - 10.80 10*3/mm3    RBC 4.91 4.14 - 5.80 10*6/mm3    Hemoglobin 13.3 13.0 - 17.7 g/dL    Hematocrit 40.8 37.5 - 51.0 %    MCV 83.1 79.0 - 97.0 fL    MCH 27.0 26.6 - 33.0 pg    MCHC 32.6 31.5 - 35.7 g/dL    RDW 17.8 (H) 12.3 - 15.4 %    RDW-SD 52.5 37.0 - 54.0 fl    MPV 8.4 6.0 - 12.0 fL    Platelets 341 140 - 450 10*3/mm3    Neutrophil % 85.6 (H) 42.7 - 76.0 %    Lymphocyte % 5.9 (L) 19.6 - 45.3 %    Monocyte % 5.4 5.0 - 12.0 %    Eosinophil % 2.4 0.3 - 6.2 %    Basophil % 0.7 0.0 - 1.5 %    Neutrophils, Absolute 12.30 (H) 1.70 - 7.00 10*3/mm3    Lymphocytes, Absolute 0.80 0.70 - 3.10 10*3/mm3    Monocytes, Absolute 0.80 0.10 - 0.90 10*3/mm3    Eosinophils, Absolute 0.30 0.00 - 0.40 10*3/mm3    Basophils, Absolute 0.10 0.00 - 0.20 10*3/mm3    nRBC 0.0 0.0 - 0.2 /100  WBC   Blood Gas, Arterial -    Specimen: Arterial Blood   Result Value Ref Range    Site Right Radial     Moise's Test Positive     pH, Arterial 7.428 7.350 - 7.450 pH units    pCO2, Arterial 36.9 35.0 - 48.0 mm Hg    pO2, Arterial 72.6 (L) 83.0 - 108.0 mm Hg    HCO3, Arterial 24.4 21.0 - 28.0 mmol/L    Base Excess, Arterial 0.3 0.0 - 3.0 mmol/L    O2 Saturation, Arterial 94.9 94.0 - 98.0 %    CO2 Content 25.5 22 - 29 mmol/L    Barometric Pressure for Blood Gas      Modality Cannula     FIO2 <21 %    Hemodilution No      XR Chest 1 View    Result Date: 6/2/2022  1. Persistent but mildly improved right basilar airspace opacity compared to the prior examination. Findings are concerning for residual pneumonia. 2. Small bilateral pleural effusions.  Electronically Signed By-Jacques To MD On:6/2/2022 10:40 AM This report was finalized on [id number] by  Jacques To MD.                                            MDM  Number of Diagnoses or Management Options  Diagnosis management comments: Patient is finding consistent with residual pneumonia in his right base on chest x-ray.  Metabolic panel is at baseline.  Patient had blood gas which shows no hypoxia or acidosis.  He was given a breathing treatment and steroids.  On reexamination he has continued expiratory wheezes.  Will be admitted to the hospital for dyspnea and COPD exacerbation.  Did speak to the patient's family physician.       Amount and/or Complexity of Data Reviewed  Clinical lab tests: reviewed  Tests in the radiology section of CPT®: reviewed    Risk of Complications, Morbidity, and/or Mortality  Presenting problems: high  Diagnostic procedures: high  Management options: high        Final diagnoses:   Dyspnea, unspecified type   COPD exacerbation (HCC)       ED Disposition  ED Disposition     ED Disposition   Decision to Admit    Condition   --    Comment   --             No follow-up provider specified.       Medication List      No changes  were made to your prescriptions during this visit.          Sanchez Lloyd MD  06/02/22 [id number]

## 2022-06-02 NOTE — PLAN OF CARE
Problem: Adult Inpatient Plan of Care  Goal: Plan of Care Review  Outcome: Ongoing, Progressing  Flowsheets (Taken 6/2/2022 1846)  Progress: no change  Plan of Care Reviewed With: patient  Outcome Evaluation: new admit for dyspnea COPD exac  and sepsis  Goal: Patient-Specific Goal (Individualized)  Outcome: Ongoing, Progressing  Goal: Absence of Hospital-Acquired Illness or Injury  Outcome: Ongoing, Progressing  Intervention: Identify and Manage Fall Risk  Recent Flowsheet Documentation  Taken 6/2/2022 1725 by Judith Elmore, RN  Safety Promotion/Fall Prevention:   safety round/check completed   room organization consistent   nonskid shoes/slippers when out of bed   lighting adjusted   fall prevention program maintained   clutter free environment maintained   assistive device/personal items within reach   activity supervised  Intervention: Prevent Skin Injury  Recent Flowsheet Documentation  Taken 6/2/2022 1725 by Judith Elmore, RN  Body Position: position changed independently  Intervention: Prevent and Manage VTE (Venous Thromboembolism) Risk  Recent Flowsheet Documentation  Taken 6/2/2022 1725 by Judith Elmore, RN  Activity Management: activity adjusted per tolerance  Intervention: Prevent Infection  Recent Flowsheet Documentation  Taken 6/2/2022 1725 by Judith Elmore, RN  Infection Prevention:   single patient room provided   rest/sleep promoted   personal protective equipment utilized   hand hygiene promoted   equipment surfaces disinfected  Goal: Optimal Comfort and Wellbeing  Outcome: Ongoing, Progressing  Intervention: Provide Person-Centered Care  Recent Flowsheet Documentation  Taken 6/2/2022 1725 by Judith Elmore RN  Trust Relationship/Rapport:   care explained   choices provided   questions answered   questions encouraged   reassurance provided   thoughts/feelings acknowledged  Goal: Readiness for Transition of Care  Outcome: Ongoing, Progressing  Intervention: Mutually  Develop Transition Plan  Recent Flowsheet Documentation  Taken 6/2/2022 1726 by Judith Elmore RN  Transportation Anticipated: family or friend will provide  Patient/Family Anticipated Services at Transition: none  Patient/Family Anticipates Transition to: home with family  Taken 6/2/2022 1721 by Judith Elmore RN  Equipment Currently Used at Home:   nebulizer   glucometer   walker, rolling     Problem: Fall Injury Risk  Goal: Absence of Fall and Fall-Related Injury  Outcome: Ongoing, Progressing  Intervention: Identify and Manage Contributors  Recent Flowsheet Documentation  Taken 6/2/2022 1725 by Judith Elmore RN  Medication Review/Management: medications reviewed  Self-Care Promotion:   BADL personal routines maintained   BADL personal objects within reach  Intervention: Promote Injury-Free Environment  Recent Flowsheet Documentation  Taken 6/2/2022 1725 by Judith Elmore RN  Safety Promotion/Fall Prevention:   safety round/check completed   room organization consistent   nonskid shoes/slippers when out of bed   lighting adjusted   fall prevention program maintained   clutter free environment maintained   assistive device/personal items within reach   activity supervised     Problem: COPD (Chronic Obstructive Pulmonary Disease) Comorbidity  Goal: Maintenance of COPD Symptom Control  Outcome: Ongoing, Progressing  Intervention: Maintain COPD-Symptom Control  Recent Flowsheet Documentation  Taken 6/2/2022 1725 by Judith Elmore RN  Medication Review/Management: medications reviewed     Problem: Diabetes Comorbidity  Goal: Blood Glucose Level Within Targeted Range  Outcome: Ongoing, Progressing     Problem: Heart Failure Comorbidity  Goal: Maintenance of Heart Failure Symptom Control  Outcome: Ongoing, Progressing  Intervention: Maintain Heart Failure-Management  Recent Flowsheet Documentation  Taken 6/2/2022 1725 by Judith Elmore RN  Medication Review/Management: medications  reviewed     Problem: Hypertension Comorbidity  Goal: Blood Pressure in Desired Range  Outcome: Ongoing, Progressing  Intervention: Maintain Blood Pressure Management  Recent Flowsheet Documentation  Taken 6/2/2022 1725 by Judith Elmore RN  Medication Review/Management: medications reviewed     Problem: Pain Chronic (Persistent) (Comorbidity Management)  Goal: Acceptable Pain Control and Functional Ability  Outcome: Ongoing, Progressing  Intervention: Manage Persistent Pain  Recent Flowsheet Documentation  Taken 6/2/2022 1725 by Judith Elmore RN  Medication Review/Management: medications reviewed  Intervention: Optimize Psychosocial Wellbeing  Recent Flowsheet Documentation  Taken 6/2/2022 1725 by Judith Elmore, RN  Diversional Activities:   television   smartphone  Family/Support System Care: support provided     Problem: Adjustment to Illness (Sepsis/Septic Shock)  Goal: Optimal Coping  Outcome: Ongoing, Progressing  Intervention: Optimize Psychosocial Adjustment to Illness  Recent Flowsheet Documentation  Taken 6/2/2022 1725 by Judith Elmore, RN  Family/Support System Care: support provided     Problem: Glycemic Control Impaired (Sepsis/Septic Shock)  Goal: Blood Glucose Level Within Desired Range  Outcome: Ongoing, Progressing   Goal Outcome Evaluation:  Plan of Care Reviewed With: patient        Progress: no change  Outcome Evaluation: new admit for dyspnea COPD exac  and sepsis

## 2022-06-02 NOTE — H&P
"    Patient Care Team:  Yusuf Jones MD as PCP - General  [facility] as Consulting Physician (Cardiology)  Moise Watson MD as Consulting Physician (Cardiac Electrophysiology)  zIzy Alvarez MD as Consulting Physician (Nephrology)  Celine Swanson MD as Consulting Physician (Cardiology)    Chief complaint shortness of breath    Subjective     Patient is a 76 y.o. male with multiple medical problems including COPD, CHF, chronic hypoxemic respiratory failure, coronary artery disease who presents with shortness of breath that started this morning.  He states he was sitting at his kitchen table and began coughing.  He was pale and diaphoretic, so was brought to the ER.  He felt better in the ER after breathing treatment and dose of steroids.  He says there was some vague substernal chest pain during the coughing episode but he states \"that is nothing new\".  At the time of my exam he was sitting up in a chair, comfortable and in no distress.  He was treated about 6 weeks ago for urinary tract infection and pneumonia.    Review of Systems   Constitutional: Positive for diaphoresis. Negative for activity change, appetite change, chills, fatigue and fever.   HENT: Negative for facial swelling.    Eyes: Negative for visual disturbance.   Respiratory: Positive for cough, shortness of breath and wheezing. Negative for stridor.    Cardiovascular: Positive for chest pain. Negative for palpitations and leg swelling.   Gastrointestinal: Negative for abdominal pain, constipation, diarrhea, nausea and vomiting.   Endocrine: Negative for polyuria.   Genitourinary: Negative for dysuria.   Musculoskeletal: Positive for arthralgias, back pain, gait problem and myalgias.   Skin: Negative for rash and wound.   Neurological: Negative for dizziness, tremors, seizures, syncope, speech difficulty, weakness, light-headedness, numbness and headaches.   Psychiatric/Behavioral: Negative for confusion. "          History  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) 10/26/2020   • Benign hypertension with CKD (chronic kidney disease) stage III (HCC) 10/26/2020   • Bruises easily    • CHF (congestive heart failure) (HCC)    • Chronic bronchitis with COPD (chronic obstructive pulmonary disease) (HCC) 10/26/2020   • Chronic respiratory failure with hypoxia (HCC) 10/26/2020   • Congenital heart defect    • Difficulty attaining erection    • Heart block    • Hernia of abdominal wall    • Hypertension    • Low back pain    • Pacemaker    • Peripheral vascular disease due to secondary diabetes (HCC) 10/26/2020   • Poor circulation    • Prostate cancer (HCC)     prostate   • Shortness of breath    • Sleep apnea     using CPAP    • Stage 3a chronic kidney disease (HCC) 10/26/2020   • Stented coronary artery 12/05/2019    MICHAEL to LAD   • Type 2 diabetes, controlled, with neuropathy (HCC) 10/26/2020    no meds   • Type 2 diabetes, controlled, with neuropathy (HCC) 10/26/2020     Past Surgical History:   Procedure Laterality Date   • ABDOMINAL SURGERY     • APPENDECTOMY     • BONE CURETTAGE Right 1/22/2021    Procedure: Wound excision with fifth metatarsal head resection, right foot.;  Surgeon: Josef Egan DPM;  Location: [facility] MAIN OR;  Service: Podiatry;  Laterality: Right;   • BONE INCISION AND DRAINAGE Right 10/5/2020    Procedure: Incision and drainage with debridement of all nonviable soft tissue and bone with bone biopsy, right foot.;  Surgeon: Josef Egan DPM;  Location: [facility] MAIN OR;  Service: Podiatry;  Laterality: Right;   • BRONCHOSCOPY N/A 4/23/2022    Procedure: BRONCHOSCOPY with bronchioalveolar lavage of right lower lobe;  Surgeon: Edwin Kline MD;  Location: [facility] ENDOSCOPY;  Service: Pulmonary;  Laterality: N/A;  pneumonia   • CARDIAC CATHETERIZATION N/A 12/5/2019    Procedure: Left Heart Cath;  Surgeon: Mirza Munson MD;  Location:   [facility] CATH INVASIVE LOCATION;  Service: Cardiology   • CARDIAC CATHETERIZATION N/A 12/5/2019    Procedure: Stent MICHAEL coronary;  Surgeon: Mirza Munson MD;  Location: [facility] CATH INVASIVE LOCATION;  Service: Cardiology   • CARDIAC CATHETERIZATION N/A 10/18/2021    Procedure: Left Heart Cath and coronary angiogram;  Surgeon: Celine Swanson MD;  Location: [facility] CATH INVASIVE LOCATION;  Service: Cardiovascular;  Laterality: N/A;   • CARDIAC CATHETERIZATION N/A 10/18/2021    Procedure: Right Heart Cath;  Surgeon: Celine Swanson MD;  Location: [facility] CATH INVASIVE LOCATION;  Service: Cardiovascular;  Laterality: N/A;   • CHOLECYSTECTOMY     • ELBOW PROCEDURE      left   • FOOT SURGERY      right foot   • HERNIA REPAIR     • INCISION AND DRAINAGE OF WOUND Right 4/6/2021    Procedure: INCISION AND DRAINAGE OF RIGHT FOOT 5TH METATARSAL TO BONE AND PARTIAL 5TH METATARSAL RESECTION;  Surgeon: Josef Egan DPM;  Location: [facility] MAIN OR;  Service: Podiatry;  Laterality: Right;   • INCISION AND DRAINAGE OF WOUND Right 4/8/2021    Procedure: INCISION AND DRAINAGE BONE, DELAYED PRIMARY CLOSURE;  Surgeon: Josef Egan DPM;  Location: [facility] MAIN OR;  Service: Podiatry;  Laterality: Right;   • INTERVENTIONAL RADIOLOGY PROCEDURE N/A 12/5/2019    Procedure: Intravascular Ultrasound;  Surgeon: Mirza Munson MD;  Location: [facility] INVASIVE LOCATION;  Service: Cardiology   • PACEMAKER IMPLANTATION     • OR RT/LT HEART CATHETERS N/A 12/5/2019    Procedure: Percutaneous Coronary Intervention;  Surgeon: Mirza Munson MD;  Location: [facility] INVASIVE LOCATION;  Service: Cardiology   • WOUND DEBRIDEMENT Right 10/29/2020    Procedure: Preparation and debridement of foot wound with application of Integra wound graft, right foot.;  Surgeon: Josef Egan DPM;  Location: [facility] MAIN OR;  Service: Podiatry;  Laterality: Right;   • WOUND DEBRIDEMENT N/A 7/29/2021    Procedure:  EXCISIONAL ABDOMINAL WOUND  DEBRIDEMENT;  Surgeon: Cleopatra Wang MD;  Location: [facility] MAIN OR;  Service: General;  Laterality: N/A;   • WOUND DEBRIDEMENT N/A 2021    Procedure: DEBRIDEMENT OF ABDOMINAL WALL;  Surgeon: Hill Bhatia DO;  Location: [facility] MAIN OR;  Service: General;  Laterality: N/A;     Family History   Problem Relation Age of Onset   • Alzheimer's disease Mother    • GI problems Father    • Heart disease Father      Social History     Tobacco Use   • Smoking status: Former Smoker     Years: 15.00     Types: Cigarettes     Start date: 1966     Quit date: 5/10/2020     Years since quittin.0   • Smokeless tobacco: Never Used   Vaping Use   • Vaping Use: Never used   Substance Use Topics   • Alcohol use: Yes     Alcohol/week: 4.0 standard drinks     Types: 4 Shots of liquor per week     Comment: maybe 4 shots per month   • Drug use: No     (Not in a hospital admission)    Allergies:  Haloperidol, Morphine, Pantoprazole, and Latex    Objective     Vital Signs  Temp:  [99 °F (37.2 °C)] 99 °F (37.2 °C)  Heart Rate:  [70-95] 70  Resp:  [18-24] 18  BP: (115-121)/(59-78) 121/59     Physical Exam:      General Appearance:    Alert, cooperative, in no acute distress, obese, chronically ill-appearing   Head:    Normocephalic, without obvious abnormality, atraumatic   Eyes:            Lids and lashes normal, conjunctivae and sclerae normal, no   icterus, no pallor, corneas clear, PERRLA   Ears:    Ears appear intact with no abnormalities noted   Throat:   No oral lesions, no thrush, oral mucosa moist   Neck:   No adenopathy, supple, trachea midline, no thyromegaly, no   carotid bruit, no JVD   Lungs:     Mild scattered wheezing    Heart:    Regular rhythm and normal rate, normal S1 and S2, no            murmur, no gallop, no rub, no click   Chest Wall:    Pacemaker   Abdomen:     Large ventral hernias, reducible   Extremities:   Moves all extremities well, no edema, no cyanosis, no              redness   Pulses:   Pulses palpable and equal bilaterally   Skin:   No bleeding, bruising or rash   Lymph nodes:   No palpable adenopathy   Neurologic: No obvious lateralizing deficits       Results Review:     Imaging Results (Last 24 Hours)     Procedure Component Value Units Date/Time    XR Chest 1 View [[id number]] Collected: 06/02/22 1039     Updated: 06/02/22 1107    Narrative:      EXAMINATION: XR CHEST 1 VW-     DATE OF EXAM: 6/2/2022 10:02 AM     INDICATION: Dyspnea.     COMPARISON: Chest radiograph dated 4/22/2022     TECHNIQUE: Portable AP view of the chest was obtained.     FINDINGS:  There is left chest wall pacemaker with leads in unchanged position.  There is cardiomegaly post surgical changes of CABG. There are low lung  volumes with right basilar airspace opacity which appears persistent but  mildly improved from the prior radiograph. There are probable small  bilateral pleural effusions. There is no evidence of pneumothorax. There  are multilevel degenerative changes of the thoracic spine.       Impression:      1. Persistent but mildly improved right basilar airspace opacity  compared to the prior examination. Findings are concerning for residual  pneumonia.  2. Small bilateral pleural effusions.     Electronically Signed By-Jacques To MD On:6/2/2022 10:40 AM  This report was finalized on [id number] by  Jacques To MD.           Lab Results (last 24 hours)     Procedure Component Value Units Date/Time    Basic Metabolic Panel [[id number]]  (Abnormal) Collected: 06/02/22 0953    Specimen: Blood Updated: 06/02/22 1027     Glucose 179 mg/dL      BUN 26 mg/dL      Creatinine 1.37 mg/dL      Sodium 134 mmol/L      Potassium 4.7 mmol/L      Comment: Slight hemolysis detected by analyzer. Results may be affected.        Chloride 96 mmol/L      CO2 25.0 mmol/L      Calcium 8.8 mg/dL      BUN/Creatinine Ratio 19.0     Anion Gap 13.0 mmol/L      eGFR 53.8 mL/min/1.73      Comment: National  Kidney Foundation and American Society of Nephrology (ASN) Task Force recommended calculation based on the Chronic Kidney Disease Epidemiology Collaboration (CKD-EPI) equation refit without adjustment for race.       Narrative:      GFR Normal >60  Chronic Kidney Disease <60  Kidney Failure <15      CBC & Differential [[id number]]  (Abnormal) Collected: 06/02/22 0953    Specimen: Blood Updated: 06/02/22 1005    Narrative:      The following orders were created for panel order CBC & Differential.  Procedure                               Abnormality         Status                     ---------                               -----------         ------                     CBC Auto Differential[[id number]]        Abnormal            Final result                 Please view results for these tests on the individual orders.    CBC Auto Differential [[id number]]  (Abnormal) Collected: 06/02/22 0953    Specimen: Blood Updated: 06/02/22 1005     WBC 14.30 10*3/mm3      RBC 4.91 10*6/mm3      Hemoglobin 13.3 g/dL      Hematocrit 40.8 %      MCV 83.1 fL      MCH 27.0 pg      MCHC 32.6 g/dL      RDW 17.8 %      RDW-SD 52.5 fl      MPV 8.4 fL      Platelets 341 10*3/mm3      Neutrophil % 85.6 %      Lymphocyte % 5.9 %      Monocyte % 5.4 %      Eosinophil % 2.4 %      Basophil % 0.7 %      Neutrophils, Absolute 12.30 10*3/mm3      Lymphocytes, Absolute 0.80 10*3/mm3      Monocytes, Absolute 0.80 10*3/mm3      Eosinophils, Absolute 0.30 10*3/mm3      Basophils, Absolute 0.10 10*3/mm3      nRBC 0.0 /100 WBC     Blood Gas, Arterial - [[id number]]  (Abnormal) Collected: 06/02/22 0956    Specimen: Arterial Blood Updated: 06/02/22 0958     Site Right Radial     Moise's Test Positive     pH, Arterial 7.428 pH units      pCO2, Arterial 36.9 mm Hg      pO2, Arterial 72.6 mm Hg      HCO3, Arterial 24.4 mmol/L      Base Excess, Arterial 0.3 mmol/L      Comment: Serial Number: [id number]:  [id number]        O2 Saturation, Arterial 94.9 %      CO2  Content 25.5 mmol/L      Barometric Pressure for Blood Gas --     Comment: N/A        Modality Cannula     FIO2 <21 %      Hemodilution No           I reviewed the patient's new clinical results.    Assessment & Plan       Dyspnea, unspecified type  Acute exacerbation of COPD -improved after bronchodilators in the ER.  We will continue steroids, bronchodilators and supplemental oxygen.  Adding Augmentin for question of persistent right lower lobe infiltrate on chest x-ray.  Obtain sputum sample if able  Chronic hypoxemic respiratory failure -supplemental oxygen  Diabetes type 2 with complication nephropathy, neuropathy, vascular disease -continue sulfonylurea; sliding scale insulin while on steroids  Obesity -counseling  Diabetic peripheral neuropathy -continue home medications  Chronic back pain -continue home medications  Chronic opioid dependence  Coronary artery disease -symptoms appear to be related to COPD but will repeat troponins and obtain EKG  Mood disorder -continue Remeron and trazodone  Chronic atrial fib -rate is controlled and patient is anticoagulated  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     Jeane Sanders MD  06/02/22  14:53 EDT

## 2022-06-03 NOTE — PLAN OF CARE
Problem: Adult Inpatient Plan of Care  Goal: Plan of Care Review  Outcome: Ongoing, Progressing  Flowsheets (Taken 6/3/2022 0239)  Plan of Care Reviewed With: patient  Outcome Evaluation: still having some coughng spells, nausea and pain. glucose running high in 300s and 400s. on iv steroids. will continue to monitor  Goal: Patient-Specific Goal (Individualized)  Outcome: Ongoing, Progressing  Goal: Absence of Hospital-Acquired Illness or Injury  Outcome: Ongoing, Progressing  Intervention: Prevent Skin Injury  Recent Flowsheet Documentation  Taken 6/2/2022 2300 by Danyell Sidhu RN  Body Position: position changed independently  Intervention: Prevent and Manage VTE (Venous Thromboembolism) Risk  Recent Flowsheet Documentation  Taken 6/2/2022 2300 by Danyell Siduh RN  Activity Management: activity adjusted per tolerance  Intervention: Prevent Infection  Recent Flowsheet Documentation  Taken 6/2/2022 2300 by Danyell Sidhu, RN  Infection Prevention:   rest/sleep promoted   single patient room provided  Goal: Optimal Comfort and Wellbeing  Outcome: Ongoing, Progressing  Goal: Readiness for Transition of Care  Outcome: Ongoing, Progressing     Problem: Fall Injury Risk  Goal: Absence of Fall and Fall-Related Injury  Outcome: Ongoing, Progressing     Problem: COPD (Chronic Obstructive Pulmonary Disease) Comorbidity  Goal: Maintenance of COPD Symptom Control  Outcome: Ongoing, Progressing     Problem: Diabetes Comorbidity  Goal: Blood Glucose Level Within Targeted Range  Outcome: Ongoing, Progressing     Problem: Heart Failure Comorbidity  Goal: Maintenance of Heart Failure Symptom Control  Outcome: Ongoing, Progressing     Problem: Hypertension Comorbidity  Goal: Blood Pressure in Desired Range  Outcome: Ongoing, Progressing     Problem: Pain Chronic (Persistent) (Comorbidity Management)  Goal: Acceptable Pain Control and Functional Ability  Outcome: Ongoing, Progressing     Problem: Adjustment to  Illness (Sepsis/Septic Shock)  Goal: Optimal Coping  Outcome: Ongoing, Progressing     Problem: Glycemic Control Impaired (Sepsis/Septic Shock)  Goal: Blood Glucose Level Within Desired Range  Outcome: Ongoing, Progressing   Goal Outcome Evaluation:  Plan of Care Reviewed With: patient           Outcome Evaluation: still having some coughng spells, nausea and pain. glucose running high in 300s and 400s. on iv steroids. will continue to monitor

## 2022-06-03 NOTE — PROGRESS NOTES
LOS: 0 days   Patient Care Team:  Yusuf Jones MD as PCP - General  PhiJoshua MD as Consulting Physician (Cardiology)  Shirley, Moise Kathleen MD as Consulting Physician (Cardiac Electrophysiology)  Izzy Alvarez MD as Consulting Physician (Nephrology)  Celine Swanson MD as Consulting Physician (Cardiology)    Subjective:  Somewhat better but continues to have some shortness of breath    Objective:   Afebrile      Review of Systems:   Review of Systems   Constitutional: Positive for activity change and appetite change.   Respiratory: Positive for cough and shortness of breath.    Cardiovascular: Negative for chest pain.   Musculoskeletal: Positive for arthralgias and back pain.           Vital Signs  Temp:  [97.5 °F (36.4 °C)-97.9 °F (36.6 °C)] 97.9 °F (36.6 °C)  Heart Rate:  [70-96] 96  Resp:  [16-24] 18  BP: (107-136)/(59-86) 136/86    Physical Exam:  Physical Exam  Constitutional:       General: He is not in acute distress.     Appearance: Normal appearance. He is ill-appearing. He is not toxic-appearing.   Cardiovascular:      Rate and Rhythm: Normal rate. Rhythm irregular.      Heart sounds: Normal heart sounds.   Pulmonary:      Breath sounds: Wheezing and rhonchi present.   Abdominal:      General: There is distension.      Palpations: Abdomen is soft.      Tenderness: There is no guarding or rebound.      Hernia: A hernia is present.   Skin:     General: Skin is warm.   Neurological:      General: No focal deficit present.      Mental Status: He is alert and oriented to person, place, and time.          Radiology:  XR Chest 1 View    Result Date: 6/2/2022  1. Persistent but mildly improved right basilar airspace opacity compared to the prior examination. Findings are concerning for residual pneumonia. 2. Small bilateral pleural effusions.  Electronically Signed By-Jacques To MD On:6/2/2022 10:40 AM This report was finalized on [id number] by  Jacques To  MD.         Results Review:     I reviewed the patient's new clinical results.  I reviewed the patient's new imaging results and agree with the interpretation.    Medication Review:   Scheduled Meds:amoxicillin-clavulanate, 1 tablet, Oral, Q12H  apixaban, 5 mg, Oral, Q12H  aspirin, 81 mg, Oral, Daily  atorvastatin, 40 mg, Oral, Nightly  budesonide, 0.5 mg, Nebulization, BID - RT  famotidine, 20 mg, Oral, BID AC  glipizide, 5 mg, Oral, QAM AC  insulin lispro, 0-7 Units, Subcutaneous, TID AC  ipratropium-albuterol, 3 mL, Nebulization, TID - RT  isosorbide mononitrate, 120 mg, Oral, Q24H  methylPREDNISolone sodium succinate, 40 mg, Intravenous, Q12H  metOLazone, 5 mg, Oral, Once per day on Mon Wed Fri  metoprolol tartrate, 25 mg, Oral, Q12H  mirtazapine, 30 mg, Oral, Nightly  pregabalin, 150 mg, Oral, BID  ranolazine, 500 mg, Oral, BID  sodium chloride, 3 mL, Intravenous, Q12H  traZODone, 100 mg, Oral, Nightly      Continuous Infusions:   PRN Meds:.benzonatate  •  dextrose  •  dextrose  •  glucagon (human recombinant)  •  HYDROmorphone  •  insulin lispro **AND** insulin lispro  •  nitroglycerin  •  ondansetron  •  oxyCODONE  •  potassium chloride  •  potassium chloride  •  [COMPLETED] Insert peripheral IV **AND** sodium chloride  •  sodium chloride    Labs:    CBC    Results from last 7 days   Lab Units 06/03/22  0456 06/02/22  0953   WBC 10*3/mm3 15.20* 14.30*   HEMOGLOBIN g/dL 11.9* 13.3   PLATELETS 10*3/mm3 299 341     BMP   Results from last 7 days   Lab Units 06/03/22  0456 06/02/22  0953   SODIUM mmol/L 131* 134*   POTASSIUM mmol/L 4.8 4.7   CHLORIDE mmol/L 97* 96*   CO2 mmol/L 20.0* 25.0   BUN mg/dL 37* 26*   CREATININE mg/dL 1.48* 1.37*   GLUCOSE mg/dL 403* 179*     Cr Clearance Estimated Creatinine Clearance: 59.2 mL/min (A) (by C-G formula based on SCr of 1.48 mg/dL (H)).  [facility]     HbA1C   Lab Results   Component Value Date    HGBA1C 8.5 (H) 06/02/2022    HGBA1C 8.5 (H) 04/19/2022    HGBA1C 6.7 (H) 09/28/2021      Blood Glucose   Glucose   Date/Time Value Ref Range Status   06/03/2022 0708 375 (H) 70 - 105 mg/dL Final     Comment:     Serial Number: [id number]:  [id number]   06/03/2022 0132 400 (H) 70 - 105 mg/dL Final     Comment:     Serial Number: [id number]:  [id number]   06/02/2022 2310 436 (C) 70 - 105 mg/dL Final     Comment:     Serial Number: [id number]:  [id number]   06/02/2022 2054 440 (C) 70 - 105 mg/dL Final     Comment:     Serial Number: [id number]:  [id number]   06/02/2022 1645 411 (C) 70 - 105 mg/dL Final     Comment:     Serial Number: [id number]:  [id number]     Infection     CMP   Results from last 7 days   Lab Units 06/03/22  0456 06/02/22  0953   SODIUM mmol/L 131* 134*   POTASSIUM mmol/L 4.8 4.7   CHLORIDE mmol/L 97* 96*   CO2 mmol/L 20.0* 25.0   BUN mg/dL 37* 26*   CREATININE mg/dL 1.48* 1.37*   GLUCOSE mg/dL 403* 179*     UA      Radiology(recent) XR Chest 1 View    Result Date: 6/2/2022  1. Persistent but mildly improved right basilar airspace opacity compared to the prior examination. Findings are concerning for residual pneumonia. 2. Small bilateral pleural effusions.  Electronically Signed By-Jacques To MD On:6/2/2022 10:40 AM This report was finalized on [id number] by  Jacques To MD.     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  Diabetes mellitus type 2 with renal manifestations  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  Chronic oral anticoagulation  Diabetic dyslipidemia  Chronic kidney disease stage IIIa  History of intermittent bowel obstruction secondary to massive ventral hernia  History of right renal mass  3.4 cm infrarenal abdominal aortic aneurysm  Opioid dependence  Hypertension associated chronic  kidney disease stage IIIa  Hypertensive nephropathy and diabetic glomerulosclerosis  Panlobular COPD with emphysema  Monoclonal paraproteinemia  Valvular heart disease with significant mitral regurgitation  Tricuspid regurgitation, mild  Paroxysmal atrial fibrillation  HFrEF with acute exacerbation  Hypercoagulable state secondary to atrial fibrillation as above  RLS  Chronic hypoxic respiratory failure  Supplemental oxygen dependency  Secondary thrombophilia  History of prostate cancer  Atherosclerotic heart disease of native coronary arteries with angina pectoris  Chronic mucopurulent bronchitis  Morbid exogenous obesity  Hypoventilation apnea syndrome with ARTI  Herbert's esophageal change  Gastroesophageal reflux disease with esophagitis  Liver cirrhosis  History of alcoholism  Nicotine dependency with nicotine use disorder, cigarettes  Peripheral polyneuropathy secondary to alcohol and diabetes  Autonomic neuropathy secondary to diabetes  B12 deficiency  PAD  Vitamin D deficiency  Dependency upon positive pressure ventilation apparatus  History of pacemaker placement  History of percutaneous coronary intervention with stent placement    Plan:  Continue care as outlined//aggressive pulmonary toilet//close observation given multiple comorbidities and poor substrate        Yusuf Jones MD  06/03/22  09:14 EDT

## 2022-06-03 NOTE — NURSING NOTE
76-year-old male presents to the hospital with increasing shortness of breath.  Patient consult received to assess for chronic abdominal wound.  Patient is current with outpatient wound care forward abdominal wound that is been present for many years.  He is unsure of the current treatment plan but I removed a calcium alginate dressing.  Patient has 2 small abdominal wounds that are granulating in nicely.  Approximate size of the area 0.8 5.8 and the larger area approximately 2-1/2 x 2 moderate amount of serosanguineous exudate noted from both wounds.  No symptoms of infection are noted.  Will recommend to continue with calcium alginate dressing.  Patient does have a great deal of tape.  We will try a silicone foam border as the skin and scar tissue surrounding is quite friable.  Patient can follow-up with outpatient wound care upon discharge

## 2022-06-03 NOTE — NURSING NOTE
After breathing treatment. Pt voiced he was breathing much better. Keeping pt on o2 at 2 liters. Resting more calmly

## 2022-06-03 NOTE — PLAN OF CARE
Problem: Adult Inpatient Plan of Care  Goal: Plan of Care Review  Outcome: Ongoing, Progressing  Flowsheets  Taken 6/3/2022 1813 by Judith Elmore, RN  Outcome Evaluation: glucose still running in the 300-400s  nsulin scale was adjusted  vss stable  continues to have pain in back  will continue to monitor  Taken 6/3/2022 0239 by Danyell Sidhu RN  Plan of Care Reviewed With: patient  Taken 6/2/2022 1846 by Judith Elmore, RN  Progress: no change  Goal: Patient-Specific Goal (Individualized)  Outcome: Ongoing, Progressing  Goal: Absence of Hospital-Acquired Illness or Injury  Outcome: Ongoing, Progressing  Intervention: Identify and Manage Fall Risk  Recent Flowsheet Documentation  Taken 6/3/2022 1542 by Judith Elmore RN  Safety Promotion/Fall Prevention:   safety round/check completed   room organization consistent   nonskid shoes/slippers when out of bed   lighting adjusted   fall prevention program maintained   clutter free environment maintained   assistive device/personal items within reach   activity supervised  Taken 6/3/2022 1155 by Judith Elmore RN  Safety Promotion/Fall Prevention:   safety round/check completed   room organization consistent   nonskid shoes/slippers when out of bed   lighting adjusted   fall prevention program maintained   clutter free environment maintained   assistive device/personal items within reach   activity supervised  Intervention: Prevent Skin Injury  Recent Flowsheet Documentation  Taken 6/3/2022 1542 by Judith Elmore, RN  Body Position: position changed independently  Taken 6/3/2022 1155 by Judith Elmore, RN  Body Position: position changed independently  Intervention: Prevent and Manage VTE (Venous Thromboembolism) Risk  Recent Flowsheet Documentation  Taken 6/3/2022 1542 by Judith Elmore, RN  Activity Management: activity adjusted per tolerance  Taken 6/3/2022 1155 by Judith Elmore, RN  Activity Management: activity  adjusted per tolerance  Intervention: Prevent Infection  Recent Flowsheet Documentation  Taken 6/3/2022 1542 by Judith Elmore, RN  Infection Prevention:   single patient room provided   rest/sleep promoted   personal protective equipment utilized   hand hygiene promoted   equipment surfaces disinfected  Taken 6/3/2022 1155 by Judith Elmore RN  Infection Prevention:   single patient room provided   rest/sleep promoted   personal protective equipment utilized   equipment surfaces disinfected   hand hygiene promoted  Goal: Optimal Comfort and Wellbeing  Outcome: Ongoing, Progressing  Intervention: Provide Person-Centered Care  Recent Flowsheet Documentation  Taken 6/3/2022 1542 by Judith Elmore RN  Trust Relationship/Rapport:   care explained   choices provided   questions answered   reassurance provided   questions encouraged   thoughts/feelings acknowledged  Taken 6/3/2022 1155 by Judith Elmore RN  Trust Relationship/Rapport:   care explained   choices provided   questions answered   questions encouraged   reassurance provided   thoughts/feelings acknowledged  Goal: Readiness for Transition of Care  Outcome: Ongoing, Progressing     Problem: Fall Injury Risk  Goal: Absence of Fall and Fall-Related Injury  Outcome: Ongoing, Progressing  Intervention: Identify and Manage Contributors  Recent Flowsheet Documentation  Taken 6/3/2022 1542 by Judith Elmore RN  Medication Review/Management: medications reviewed  Taken 6/3/2022 1155 by Judith Elmore RN  Medication Review/Management: medications reviewed  Self-Care Promotion:   BADL personal objects within reach   BADL personal routines maintained  Intervention: Promote Injury-Free Environment  Recent Flowsheet Documentation  Taken 6/3/2022 1542 by Judith Elmore RN  Safety Promotion/Fall Prevention:   safety round/check completed   room organization consistent   nonskid shoes/slippers when out of bed   lighting adjusted   fall  prevention program maintained   clutter free environment maintained   assistive device/personal items within reach   activity supervised  Taken 6/3/2022 1155 by Judith Elmore RN  Safety Promotion/Fall Prevention:   safety round/check completed   room organization consistent   nonskid shoes/slippers when out of bed   lighting adjusted   fall prevention program maintained   clutter free environment maintained   assistive device/personal items within reach   activity supervised     Problem: COPD (Chronic Obstructive Pulmonary Disease) Comorbidity  Goal: Maintenance of COPD Symptom Control  Outcome: Ongoing, Progressing  Intervention: Maintain COPD-Symptom Control  Recent Flowsheet Documentation  Taken 6/3/2022 1542 by Judith Elmore RN  Medication Review/Management: medications reviewed  Taken 6/3/2022 1155 by Judith Elmore RN  Medication Review/Management: medications reviewed     Problem: Diabetes Comorbidity  Goal: Blood Glucose Level Within Targeted Range  Outcome: Ongoing, Progressing     Problem: Heart Failure Comorbidity  Goal: Maintenance of Heart Failure Symptom Control  Outcome: Ongoing, Progressing  Intervention: Maintain Heart Failure-Management  Recent Flowsheet Documentation  Taken 6/3/2022 1542 by Judith Elmore RN  Medication Review/Management: medications reviewed  Taken 6/3/2022 1155 by Judith Elmore RN  Medication Review/Management: medications reviewed     Problem: Hypertension Comorbidity  Goal: Blood Pressure in Desired Range  Outcome: Ongoing, Progressing  Intervention: Maintain Blood Pressure Management  Recent Flowsheet Documentation  Taken 6/3/2022 1542 by Judith Elmore RN  Medication Review/Management: medications reviewed  Taken 6/3/2022 1155 by Judith Elmore RN  Medication Review/Management: medications reviewed     Problem: Pain Chronic (Persistent) (Comorbidity Management)  Goal: Acceptable Pain Control and Functional Ability  Outcome:  Ongoing, Progressing  Intervention: Manage Persistent Pain  Recent Flowsheet Documentation  Taken 6/3/2022 1542 by Judith Elmore, RN  Medication Review/Management: medications reviewed  Taken 6/3/2022 1155 by Judith Elmore RN  Medication Review/Management: medications reviewed  Intervention: Optimize Psychosocial Wellbeing  Recent Flowsheet Documentation  Taken 6/3/2022 1542 by Judith Elmore RN  Diversional Activities: television  Family/Support System Care:   support provided   self-care encouraged  Taken 6/3/2022 1155 by Judith Elmore RN  Diversional Activities: television  Family/Support System Care:   support provided   self-care encouraged     Problem: Adjustment to Illness (Sepsis/Septic Shock)  Goal: Optimal Coping  Outcome: Ongoing, Progressing  Intervention: Optimize Psychosocial Adjustment to Illness  Recent Flowsheet Documentation  Taken 6/3/2022 1542 by Judith Elmore RN  Family/Support System Care:   support provided   self-care encouraged  Taken 6/3/2022 1155 by Judith Elmore RN  Family/Support System Care:   support provided   self-care encouraged     Problem: Glycemic Control Impaired (Sepsis/Septic Shock)  Goal: Blood Glucose Level Within Desired Range  Outcome: Ongoing, Progressing   Goal Outcome Evaluation:  Plan of Care Reviewed With: patient        Progress: no change  Outcome Evaluation: glucose still running in the 300-400s  nsulin scale was adjusted  vss stable  continues to have pain in back  will continue to monitor

## 2022-06-03 NOTE — CASE MANAGEMENT/SOCIAL WORK
Discharge Planning Assessment   [facility]     Patient Name: Ro Nathan  MRN: [id number]  Today's Date: 6/3/2022    Admit Date: 6/2/2022     Discharge Needs Assessment     Row Name 06/03/22 1745       Living Environment    People in Home spouse;other relative(s)  family member is nurse    Current Living Arrangements home    Primary Care Provided by self    Provides Primary Care For no one    Family Caregiver if Needed spouse;other relative(s)    Quality of Family Relationships helpful;involved;supportive    Able to Return to Prior Arrangements yes       Resource/Environmental Concerns    Resource/Environmental Concerns none    Transportation Concerns none       Transition Planning    Patient/Family Anticipates Transition to home with family    Patient/Family Anticipated Services at Transition none    Transportation Anticipated family or friend will provide       Discharge Needs Assessment    Readmission Within the Last 30 Days no previous admission in last 30 days    Equipment Currently Used at Home nebulizer;glucometer;walker, rolling    Concerns to be Addressed no discharge needs identified    Anticipated Changes Related to Illness none    Equipment Needed After Discharge none               Discharge Plan     Row Name 06/03/22 1740       Plan    Plan D/C Plan: Anticipate routine home with [facility].    Patient/Family in Agreement with Plan yes    Plan Comments Spoke with patient at bedside. Lives at home with wife and other family member who is  nurse.  States he uses walker occasionally.   Confirmed PMD and pharmacy.  Denies discharge needs at this time.  Barriers to D/C:  SOA, antibiotics              Continued Care and Services - Admitted Since 6/2/2022     Home Medical Care     Service Provider Request Status Selected Services Address Phone Fax Patient Preferred    [facility]  Accepted N/A [address] [place] [id number] [phone] --                     Demographic Summary     Row Name 06/03/22 1744       General Information    Admission Type observation    Arrived From emergency department    Required Notices Provided Observation Status Notice    Referral Source admission list    Preferred Language English               Functional Status     Row Name 06/03/22 1744       Functional Status    Usual Activity Tolerance moderate    Current Activity Tolerance moderate       Functional Status, IADL    Medications assistive person    Meal Preparation assistive person    Housekeeping assistive person    Laundry assistive person    Shopping assistive person       Mental Status    General Appearance WDL WDL       Mental Status Summary    Recent Changes in Mental Status/Cognitive Functioning no changes              Met with patient in room wearing PPE: mask.      Maintained distance greater than six feet and spent less than 15 minutes in the room      Yulia Barnett RN     Office Phone [phone]  Office Cell [phone]

## 2022-06-03 NOTE — NURSING NOTE
Pt having course coughing spell. Sat up  to use urinal. Felt some chest pain. Nitro given with relief of chest pain. Pt still coughing harsh,. Rt up on floor to assess pt. Stat ekg obtained

## 2022-06-04 NOTE — PLAN OF CARE
Problem: Adult Inpatient Plan of Care  Goal: Plan of Care Review  Outcome: Ongoing, Progressing  Goal: Patient-Specific Goal (Individualized)  Outcome: Ongoing, Progressing  Goal: Absence of Hospital-Acquired Illness or Injury  Outcome: Ongoing, Progressing  Intervention: Identify and Manage Fall Risk  Description: Perform standard risk assessment on admission using a validated tool or comprehensive approach appropriate to the patient; reassess fall risk frequently, with change in status or transfer to another level of care.  Communicate fall injury risk to interprofessional healthcare team.  Determine need for increased observation, equipment and environmental modification, such as low bed, signage and supportive, nonskid footwear.  Adjust safety measures to individual developmental age, stage and identified risk factors.  Reinforce the importance of safety and physical activity with patient and family.  Perform regular intentional rounding to assess need for position change, pain assessment and personal needs, including assistance with toileting.  Recent Flowsheet Documentation  Taken 6/4/2022 1700 by Holly Shaffer, RN  Safety Promotion/Fall Prevention:   assistive device/personal items within reach   clutter free environment maintained   safety round/check completed   room organization consistent  Taken 6/4/2022 1501 by Holly Shaffer, RN  Safety Promotion/Fall Prevention:   assistive device/personal items within reach   clutter free environment maintained   safety round/check completed   room organization consistent  Taken 6/4/2022 1300 by Hloly Shaffer, RN  Safety Promotion/Fall Prevention:   safety round/check completed   room organization consistent   clutter free environment maintained   assistive device/personal items within reach  Intervention: Prevent Skin Injury  Description: Perform a screening for skin injury risk, such as pressure or moisture associated skin damage on admission and at regular  intervals throughout hospital stay.  Keep all areas of skin (especially folds) clean and dry.  Maintain adequate skin hydration.  Relieve and redistribute pressure and protect bony prominences; implement measures based on patient-specific risk factors.  Match turning and repositioning schedule to clinical condition.  Encourage weight shift frequently; assist with reposition if unable to complete independently.  Float heels off bed; avoid pressure on the Achilles tendon.  Keep skin free from extended contact with medical devices.  Encourage functional activity and mobility, as early as tolerated.  Use aids (e.g., slide boards, mechanical lift) during transfer.  Recent Flowsheet Documentation  Taken 6/4/2022 1700 by Holly Shaffer RN  Body Position:   position changed independently   sitting up in bed  Taken 6/4/2022 1501 by Holly Shaffer RN  Body Position:   supine   position changed independently  Taken 6/4/2022 1300 by Holly Shaffer RN  Body Position:   supine   position changed independently  Intervention: Prevent and Manage VTE (Venous Thromboembolism) Risk  Description: Assess for VTE (venous thromboembolism) risk.  Encourage and assist with early ambulation.  Initiate and maintain compression or other therapy, as indicated, based on identified risk in accordance with organizational protocol and provider order.  Encourage both active and passive leg exercises while in bed, if unable to ambulate.  Recent Flowsheet Documentation  Taken 6/4/2022 1700 by Holly Shaffer RN  Activity Management: activity encouraged  Taken 6/4/2022 1501 by Holly Shaffer, IAN  Activity Management:   up ad gavin   activity encouraged  Taken 6/4/2022 1300 by Holly Shaffer RN  Activity Management:   activity encouraged   up ad gavin  Intervention: Prevent Infection  Description: Maintain skin and mucous membrane integrity; promote hand, oral and pulmonary hygiene.  Optimize fluid balance, nutrition, sleep and glycemic control to maximize  infection resistance.  Identify potential sources of infection early to prevent or mitigate progression of infection (e.g., wound, lines, devices).  Evaluate ongoing need for invasive devices; remove promptly when no longer indicated.  Recent Flowsheet Documentation  Taken 6/4/2022 1700 by Holly Shaffer RN  Infection Prevention:   single patient room provided   rest/sleep promoted  Taken 6/4/2022 1501 by Holly Shaffer RN  Infection Prevention:   single patient room provided   rest/sleep promoted  Taken 6/4/2022 1300 by Holly Shaffer RN  Infection Prevention:   single patient room provided   rest/sleep promoted  Goal: Optimal Comfort and Wellbeing  Outcome: Ongoing, Progressing  Intervention: Monitor Pain and Promote Comfort  Description: Assess pain level, treatment efficacy and patient response at regular intervals using a consistent pain scale.  Consider the presence and impact of preexisting chronic pain.  Encourage patient and caregiver involvement in pain assessment, interventions and safety measures.  Recent Flowsheet Documentation  Taken 6/4/2022 1501 by Holly Shaffer RN  Pain Management Interventions:   see MAR   quiet environment facilitated  Intervention: Provide Person-Centered Care  Description: Use a family-focused approach to care.  Develop trust and rapport by proactively providing information, encouraging questions, addressing concerns and offering reassurance.  Acknowledge emotional response to hospitalization.  Recognize and utilize personal coping strategies.  Honor spiritual and cultural preferences.  Recent Flowsheet Documentation  Taken 6/4/2022 1501 by Holly Shaffer RN  Trust Relationship/Rapport:   care explained   choices provided   emotional support provided   empathic listening provided   questions answered   questions encouraged   reassurance provided   thoughts/feelings acknowledged  Goal: Readiness for Transition of Care  Outcome: Ongoing, Progressing   Goal Outcome Evaluation:             IV steroids, IV pain meds, possible discharge home tomorrow.

## 2022-06-04 NOTE — PROGRESS NOTES
LOS: 0 days   Patient Care Team:  Yusuf Jones MD as PCP - General  PhiJoshua MD as Consulting Physician (Cardiology)  Moise Watson MD as Consulting Physician (Cardiac Electrophysiology)  Izzy Alvarez MD as Consulting Physician (Nephrology)  Celine Swanson MD as Consulting Physician (Cardiology)    Subjective:  Better with less shortness of breath    Objective:   Afebrile      Review of Systems:   Review of Systems   Constitutional: Positive for activity change.   Respiratory: Negative.    Cardiovascular: Negative.    Genitourinary: Negative.            Vital Signs  Temp:  [97.5 °F (36.4 °C)-98.4 °F (36.9 °C)] 98.4 °F (36.9 °C)  Heart Rate:  [70-93] 78  Resp:  [16-20] 16  BP: (116-131)/(64-78) 125/64    Physical Exam:  Physical Exam  Cardiovascular:      Rate and Rhythm: Normal rate.      Heart sounds: Normal heart sounds.   Pulmonary:      Breath sounds: Normal breath sounds.   Skin:     General: Skin is warm.   Neurological:      Mental Status: He is alert.          Radiology:  XR Chest 1 View    Result Date: 6/2/2022  1. Persistent but mildly improved right basilar airspace opacity compared to the prior examination. Findings are concerning for residual pneumonia. 2. Small bilateral pleural effusions.  Electronically Signed By-Jacques To MD On:6/2/2022 10:40 AM This report was finalized on [id number] by  Jacques To MD.         Results Review:     I reviewed the patient's new clinical results.  I reviewed the patient's new imaging results and agree with the interpretation.    Medication Review:   Scheduled Meds:amoxicillin-clavulanate, 1 tablet, Oral, Q12H  apixaban, 5 mg, Oral, Q12H  aspirin, 81 mg, Oral, Daily  atorvastatin, 40 mg, Oral, Nightly  budesonide, 0.5 mg, Nebulization, BID - RT  famotidine, 20 mg, Oral, BID AC  glipizide, 5 mg, Oral, QAM AC  insulin lispro, 0-24 Units, Subcutaneous, TID AC  ipratropium-albuterol, 3 mL, Nebulization, TID  - RT  isosorbide mononitrate, 120 mg, Oral, Q24H  methylPREDNISolone sodium succinate, 40 mg, Intravenous, Q12H  metOLazone, 5 mg, Oral, Once per day on Mon Wed Fri  metoprolol tartrate, 25 mg, Oral, Q12H  mirtazapine, 30 mg, Oral, Nightly  pregabalin, 150 mg, Oral, BID  ranolazine, 500 mg, Oral, BID  sodium chloride, 3 mL, Intravenous, Q12H  traZODone, 100 mg, Oral, Nightly      Continuous Infusions:   PRN Meds:.benzonatate  •  dextrose  •  dextrose  •  glucagon (human recombinant)  •  HYDROmorphone  •  insulin lispro **AND** insulin lispro  •  nitroglycerin  •  ondansetron  •  oxyCODONE  •  potassium chloride  •  potassium chloride  •  [COMPLETED] Insert peripheral IV **AND** sodium chloride  •  sodium chloride    Labs:    CBC    Results from last 7 days   Lab Units 06/04/22  0457 06/03/22  0456 06/02/22  0953   WBC 10*3/mm3 15.50* 15.20* 14.30*   HEMOGLOBIN g/dL 11.2* 11.9* 13.3   PLATELETS 10*3/mm3 297 299 341     BMP   Results from last 7 days   Lab Units 06/04/22 0457 06/03/22  0456 06/02/22  0953   SODIUM mmol/L 129* 131* 134*   POTASSIUM mmol/L 5.6* 4.8 4.7   CHLORIDE mmol/L 98 97* 96*   CO2 mmol/L 21.0* 20.0* 25.0   BUN mg/dL 44* 37* 26*   CREATININE mg/dL 1.28* 1.48* 1.37*   GLUCOSE mg/dL 327* 403* 179*     Cr Clearance Estimated Creatinine Clearance: 68.7 mL/min (A) (by C-G formula based on SCr of 1.28 mg/dL (H)).  Coag     HbA1C   Lab Results   Component Value Date    HGBA1C 8.5 (H) 06/02/2022    HGBA1C 8.5 (H) 04/19/2022    HGBA1C 6.7 (H) 09/28/2021     Blood Glucose   Glucose   Date/Time Value Ref Range Status   06/04/2022 0733 323 (H) 70 - 105 mg/dL Final     Comment:     Serial Number: [id number]:  [id number]   06/03/2022 2116 295 (H) 70 - 105 mg/dL Final     Comment:     Serial Number: [id number]:  [id number]   06/03/2022 1606 422 (C) 70 - 105 mg/dL Final     Comment:     Serial Number: [id number]:  [id number]   06/03/2022 1147 314 (H) 70 - 105 mg/dL Final     Comment:     Serial  Number: [id number]:  [id number]   06/03/2022 0708 375 (H) 70 - 105 mg/dL Final     Comment:     Serial Number: [id number]:  [id number]   06/03/2022 0132 400 (H) 70 - 105 mg/dL Final     Comment:     Serial Number: [id number]:  [id number]   06/02/2022 2310 436 (C) 70 - 105 mg/dL Final     Comment:     Serial Number: [id number]:  [id number]   06/02/2022 2054 440 (C) 70 - 105 mg/dL Final     Comment:     Serial Number: [id number]:  [id number]     Infection     CMP   Results from last 7 days   Lab Units 06/04/22  0457 06/03/22  0456 06/02/22  0953   SODIUM mmol/L 129* 131* 134*   POTASSIUM mmol/L 5.6* 4.8 4.7   CHLORIDE mmol/L 98 97* 96*   CO2 mmol/L 21.0* 20.0* 25.0   BUN mg/dL 44* 37* 26*   CREATININE mg/dL 1.28* 1.48* 1.37*   GLUCOSE mg/dL 327* 403* 179*     UA      Radiology(recent) XR Chest 1 View    Result Date: 6/2/2022  1. Persistent but mildly improved right basilar airspace opacity compared to the prior examination. Findings are concerning for residual pneumonia. 2. Small bilateral pleural effusions.  Electronically Signed By-Jacques To MD On:6/2/2022 10:40 AM This report was finalized on [id number] by  Jacques To MD.     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  Diabetes mellitus type 2 with renal manifestations  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  Chronic oral anticoagulation  Diabetic dyslipidemia  Chronic kidney disease stage IIIa  History of intermittent bowel obstruction secondary to massive ventral hernia  History of right renal mass  3.4 cm infrarenal abdominal aortic aneurysm  Opioid dependence  Hypertension associated chronic kidney disease stage IIIa  Hypertensive nephropathy and diabetic  glomerulosclerosis  Panlobular COPD with emphysema  Monoclonal paraproteinemia  Valvular heart disease with significant mitral regurgitation  Tricuspid regurgitation, mild  Paroxysmal atrial fibrillation  HFrEF with acute exacerbation  Hypercoagulable state secondary to atrial fibrillation as above  RLS  Chronic hypoxic respiratory failure  Supplemental oxygen dependency  Secondary thrombophilia  History of prostate cancer  Atherosclerotic heart disease of native coronary arteries with angina pectoris  Chronic mucopurulent bronchitis  Morbid exogenous obesity  Hypoventilation apnea syndrome with ARTI  Herbert's esophageal change  Gastroesophageal reflux disease with esophagitis  Liver cirrhosis  History of alcoholism  Nicotine dependency with nicotine use disorder, cigarettes  Peripheral polyneuropathy secondary to alcohol and diabetes  Autonomic neuropathy secondary to diabetes  B12 deficiency  PAD  Vitamin D deficiency  Dependency upon positive pressure ventilation apparatus  History of pacemaker placement  History of percutaneous coronary intervention with stent placement    Plan:    Continue present approach        Yusuf Jones MD  06/04/22  08:21 EDT

## 2022-06-04 NOTE — PLAN OF CARE
Problem: Adult Inpatient Plan of Care  Goal: Plan of Care Review  Outcome: Ongoing, Progressing  Goal: Patient-Specific Goal (Individualized)  Outcome: Ongoing, Progressing  Goal: Absence of Hospital-Acquired Illness or Injury  Outcome: Ongoing, Progressing  Intervention: Identify and Manage Fall Risk  Recent Flowsheet Documentation  Taken 6/4/2022 0510 by Anna Orr RN  Safety Promotion/Fall Prevention: safety round/check completed  Taken 6/4/2022 0310 by Anna Orr RN  Safety Promotion/Fall Prevention: safety round/check completed  Taken 6/4/2022 0110 by Anna Orr RN  Safety Promotion/Fall Prevention: safety round/check completed  Taken 6/3/2022 2325 by Anna Orr RN  Safety Promotion/Fall Prevention: safety round/check completed  Taken 6/3/2022 2105 by Anna Orr RN  Safety Promotion/Fall Prevention: safety round/check completed  Taken 6/3/2022 1925 by Anna Orr RN  Safety Promotion/Fall Prevention: safety round/check completed  Intervention: Prevent and Manage VTE (Venous Thromboembolism) Risk  Recent Flowsheet Documentation  Taken 6/3/2022 1925 by Anna Orr RN  VTE Prevention/Management:   bilateral   dorsiflexion/plantar flexion performed  Intervention: Prevent Infection  Recent Flowsheet Documentation  Taken 6/4/2022 0510 by Anna Orr RN  Infection Prevention:   single patient room provided   rest/sleep promoted   hand hygiene promoted  Taken 6/4/2022 0110 by Anna Orr RN  Infection Prevention:   hand hygiene promoted   rest/sleep promoted   single patient room provided  Taken 6/3/2022 2325 by Anna Orr RN  Infection Prevention:   single patient room provided   rest/sleep promoted   hand hygiene promoted  Taken 6/3/2022 2105 by Anna Orr RN  Infection Prevention:   single patient room provided   rest/sleep promoted   hand hygiene promoted  Taken 6/3/2022 1925 by Anna Orr RN  Infection Prevention:   single patient room provided   rest/sleep promoted   hand hygiene  promoted  Goal: Optimal Comfort and Wellbeing  Outcome: Ongoing, Progressing  Intervention: Monitor Pain and Promote Comfort  Recent Flowsheet Documentation  Taken 6/4/2022 0150 by Anna Orr RN  Pain Management Interventions: see MAR  Intervention: Provide Person-Centered Care  Recent Flowsheet Documentation  Taken 6/3/2022 1925 by Anna Orr, RN  Trust Relationship/Rapport:   care explained   questions answered   questions encouraged   thoughts/feelings acknowledged  Goal: Readiness for Transition of Care  Outcome: Ongoing, Progressing   Goal Outcome Evaluation:            Patient resting in bed at this time.  Complaints of pain that is resolved with IV pain medication.  Safety precautions in use.  Vitals stable and on room air.  Will continue to monitor.

## 2022-06-05 NOTE — PLAN OF CARE
Problem: Adult Inpatient Plan of Care  Goal: Plan of Care Review  Outcome: Ongoing, Progressing  Goal: Patient-Specific Goal (Individualized)  Outcome: Ongoing, Progressing  Goal: Absence of Hospital-Acquired Illness or Injury  Outcome: Ongoing, Progressing  Intervention: Identify and Manage Fall Risk  Recent Flowsheet Documentation  Taken 6/5/2022 0358 by Anna Orr RN  Safety Promotion/Fall Prevention: safety round/check completed  Taken 6/5/2022 0328 by Anna Orr RN  Safety Promotion/Fall Prevention: safety round/check completed  Taken 6/5/2022 0105 by Anna Orr RN  Safety Promotion/Fall Prevention: safety round/check completed  Taken 6/4/2022 2331 by Anna Orr RN  Safety Promotion/Fall Prevention: safety round/check completed  Taken 6/4/2022 2105 by Anna Orr RN  Safety Promotion/Fall Prevention: safety round/check completed  Taken 6/4/2022 1931 by Anna Orr RN  Safety Promotion/Fall Prevention: safety round/check completed  Intervention: Prevent and Manage VTE (Venous Thromboembolism) Risk  Recent Flowsheet Documentation  Taken 6/4/2022 1931 by Anna Orr RN  VTE Prevention/Management:   bilateral   dorsiflexion/plantar flexion performed  Intervention: Prevent Infection  Recent Flowsheet Documentation  Taken 6/5/2022 0358 by Anna Orr RN  Infection Prevention:   single patient room provided   rest/sleep promoted   hand hygiene promoted  Taken 6/5/2022 0328 by Anna Orr RN  Infection Prevention:   single patient room provided   rest/sleep promoted   hand hygiene promoted  Taken 6/5/2022 0105 by Anna Orr RN  Infection Prevention:   single patient room provided   rest/sleep promoted   hand hygiene promoted  Taken 6/4/2022 2331 by Anna Orr RN  Infection Prevention:   single patient room provided   rest/sleep promoted   hand hygiene promoted  Taken 6/4/2022 2105 by Anna Orr RN  Infection Prevention:   hand hygiene promoted   rest/sleep promoted   single patient room  provided  Taken 6/4/2022 1931 by Anna Orr RN  Infection Prevention:   single patient room provided   rest/sleep promoted   hand hygiene promoted  Goal: Optimal Comfort and Wellbeing  Outcome: Ongoing, Progressing  Intervention: Monitor Pain and Promote Comfort  Recent Flowsheet Documentation  Taken 6/5/2022 0328 by Anna Orr RN  Pain Management Interventions: see MAR  Taken 6/4/2022 2331 by Anna rOr RN  Pain Management Interventions: see MAR  Taken 6/4/2022 1931 by Anna Orr RN  Pain Management Interventions: see MAR  Intervention: Provide Person-Centered Care  Recent Flowsheet Documentation  Taken 6/4/2022 1931 by Anna Orr RN  Trust Relationship/Rapport:   care explained   questions answered   questions encouraged   thoughts/feelings acknowledged  Goal: Readiness for Transition of Care  Outcome: Ongoing, Progressing   Goal Outcome Evaluation:   Patient resting in bed.  Complaints of pain this shift that is resolved with IV pain medication.  Vitals stable and on room air.  Safety precautions in use.  Patient however refused the bed alarm.  Patient has been free from falls this shift.  Will continue to monitor.                not applicable

## 2022-06-05 NOTE — PLAN OF CARE
Problem: Adult Inpatient Plan of Care  Goal: Plan of Care Review  Outcome: Ongoing, Progressing  Goal: Patient-Specific Goal (Individualized)  Outcome: Ongoing, Progressing  Goal: Absence of Hospital-Acquired Illness or Injury  Outcome: Ongoing, Progressing  Intervention: Identify and Manage Fall Risk  Description: Perform standard risk assessment on admission using a validated tool or comprehensive approach appropriate to the patient; reassess fall risk frequently, with change in status or transfer to another level of care.  Communicate fall injury risk to interprofessional healthcare team.  Determine need for increased observation, equipment and environmental modification, such as low bed, signage and supportive, nonskid footwear.  Adjust safety measures to individual developmental age, stage and identified risk factors.  Reinforce the importance of safety and physical activity with patient and family.  Perform regular intentional rounding to assess need for position change, pain assessment and personal needs, including assistance with toileting.  Recent Flowsheet Documentation  Taken 6/5/2022 1300 by Holly Shaffer RN  Safety Promotion/Fall Prevention:   assistive device/personal items within reach   clutter free environment maintained   safety round/check completed   room organization consistent  Taken 6/5/2022 1112 by Holly Shaffer RN  Safety Promotion/Fall Prevention:   assistive device/personal items within reach   clutter free environment maintained   safety round/check completed   room organization consistent  Intervention: Prevent Skin Injury  Description: Perform a screening for skin injury risk, such as pressure or moisture associated skin damage on admission and at regular intervals throughout hospital stay.  Keep all areas of skin (especially folds) clean and dry.  Maintain adequate skin hydration.  Relieve and redistribute pressure and protect bony prominences; implement measures based on  patient-specific risk factors.  Match turning and repositioning schedule to clinical condition.  Encourage weight shift frequently; assist with reposition if unable to complete independently.  Float heels off bed; avoid pressure on the Achilles tendon.  Keep skin free from extended contact with medical devices.  Encourage functional activity and mobility, as early as tolerated.  Use aids (e.g., slide boards, mechanical lift) during transfer.  Recent Flowsheet Documentation  Taken 6/5/2022 1300 by Holly Shaffer RN  Body Position:   supine   position changed independently  Taken 6/5/2022 1112 by Holly Shaffer RN  Body Position:   supine   position changed independently  Intervention: Prevent and Manage VTE (Venous Thromboembolism) Risk  Description: Assess for VTE (venous thromboembolism) risk.  Encourage and assist with early ambulation.  Initiate and maintain compression or other therapy, as indicated, based on identified risk in accordance with organizational protocol and provider order.  Encourage both active and passive leg exercises while in bed, if unable to ambulate.  Recent Flowsheet Documentation  Taken 6/5/2022 1300 by Holly Shaffer RN  Activity Management: activity encouraged  Taken 6/5/2022 1112 by Holly Shaffer RN  Activity Management: activity encouraged  Intervention: Prevent Infection  Description: Maintain skin and mucous membrane integrity; promote hand, oral and pulmonary hygiene.  Optimize fluid balance, nutrition, sleep and glycemic control to maximize infection resistance.  Identify potential sources of infection early to prevent or mitigate progression of infection (e.g., wound, lines, devices).  Evaluate ongoing need for invasive devices; remove promptly when no longer indicated.  Recent Flowsheet Documentation  Taken 6/5/2022 1300 by Holly Shaffer RN  Infection Prevention:   rest/sleep promoted   single patient room provided  Taken 6/5/2022 1112 by Holly Shaffer RN  Infection  Prevention:   single patient room provided   rest/sleep promoted  Goal: Optimal Comfort and Wellbeing  Outcome: Ongoing, Progressing  Intervention: Monitor Pain and Promote Comfort  Description: Assess pain level, treatment efficacy and patient response at regular intervals using a consistent pain scale.  Consider the presence and impact of preexisting chronic pain.  Encourage patient and caregiver involvement in pain assessment, interventions and safety measures.  Recent Flowsheet Documentation  Taken 6/5/2022 1112 by Holly Shaffer RN  Pain Management Interventions:   see MAR   quiet environment facilitated  Intervention: Provide Person-Centered Care  Description: Use a family-focused approach to care.  Develop trust and rapport by proactively providing information, encouraging questions, addressing concerns and offering reassurance.  Acknowledge emotional response to hospitalization.  Recognize and utilize personal coping strategies.  Honor spiritual and cultural preferences.  Recent Flowsheet Documentation  Taken 6/5/2022 1112 by Holly Shaffer RN  Trust Relationship/Rapport:   care explained   choices provided   empathic listening provided   emotional support provided   questions answered   questions encouraged   reassurance provided   thoughts/feelings acknowledged  Goal: Readiness for Transition of Care  Outcome: Ongoing, Progressing   Goal Outcome Evaluation:         Patient to continue to be monitored over night, continue IV steroids and breathing treatments. Possible discharge home tomorrow.

## 2022-06-05 NOTE — PROGRESS NOTES
LOS: 0 days   Patient Care Team:  [facility] MD as PCP [facility] (Cardiology)  Shirley, Moise Kathleen MD as Consulting Physician (Cardiac Electrophysiology)  Izzy Alvarez MD as Consulting Physician (Nephrology)  Celine Swanson MD as Consulting Physician (Cardiology)    Subjective:  Much improved today    Objective:   Less short of breath      Review of Systems:   Review of Systems   Constitutional: Positive for activity change.   Respiratory: Positive for shortness of breath.    Cardiovascular: Negative.    Musculoskeletal: Positive for arthralgias and back pain.           Vital Signs  Temp:  [97.4 °F (36.3 °C)-98.6 °F (37 °C)] 98.6 °F (37 °C)  Heart Rate:  [70-97] 96  Resp:  [14-18] 16  BP: (109-129)/(62-77) 109/62    Physical Exam:  Physical Exam  Vitals reviewed.   Cardiovascular:      Rate and Rhythm: Normal rate.      Heart sounds: Normal heart sounds.   Pulmonary:      Breath sounds: Normal breath sounds.   Abdominal:      Hernia: A hernia is present.   Neurological:      Mental Status: He is alert.          Radiology:  XR Chest 1 View    Result Date: 6/2/2022  1. Persistent but mildly improved right basilar airspace opacity compared to the prior examination. Findings are concerning for residual pneumonia. 2. Small bilateral pleural effusions.  Electronically Signed By-Jacques To MD On:6/2/2022 10:40 AM This report was finalized on [id number] by  Jacques To MD.         Results Review:     I reviewed the patient's new clinical results.  I reviewed the patient's new imaging results and agree with the interpretation.    Medication Review:   Scheduled Meds:amoxicillin-clavulanate, 1 tablet, Oral, Q12H  apixaban, 5 mg, Oral, Q12H  aspirin, 81 mg, Oral, Daily  atorvastatin, 40 mg, Oral, Nightly  budesonide, 0.5 mg, Nebulization, BID - RT  famotidine, 20 mg, Oral, BID AC  glipizide, 5 mg, Oral, QAM AC  insulin lispro, 0-24  Units, Subcutaneous, TID AC  ipratropium-albuterol, 3 mL, Nebulization, TID - RT  isosorbide mononitrate, 120 mg, Oral, Q24H  methylPREDNISolone sodium succinate, 20 mg, Intravenous, Q12H  metOLazone, 5 mg, Oral, Once per day on Mon Wed Fri  metoprolol tartrate, 25 mg, Oral, Q12H  mirtazapine, 30 mg, Oral, Nightly  pregabalin, 150 mg, Oral, BID  ranolazine, 500 mg, Oral, BID  sodium chloride, 3 mL, Intravenous, Q12H  traZODone, 100 mg, Oral, Nightly      Continuous Infusions:   PRN Meds:.benzonatate  •  dextrose  •  dextrose  •  glucagon (human recombinant)  •  HYDROmorphone  •  insulin lispro **AND** insulin lispro  •  nitroglycerin  •  ondansetron  •  oxyCODONE  •  potassium chloride  •  potassium chloride  •  [COMPLETED] Insert peripheral IV **AND** sodium chloride  •  sodium chloride    Labs:    CBC    Results from last 7 days   Lab Units 06/05/22  0511 06/04/22  0457 06/03/22  0456 06/02/22  0953   WBC 10*3/mm3 12.40* 15.50* 15.20* 14.30*   HEMOGLOBIN g/dL 12.0* 11.2* 11.9* 13.3   PLATELETS 10*3/mm3 302 297 299 341     BMP   Results from last 7 days   Lab Units 06/05/22  0511 06/04/22  0457 06/03/22  0456 06/02/22  0953   SODIUM mmol/L 131* 129* 131* 134*   POTASSIUM mmol/L 5.7* 5.6* 4.8 4.7   CHLORIDE mmol/L 97* 98 97* 96*   CO2 mmol/L 23.0 21.0* 20.0* 25.0   BUN mg/dL 39* 44* 37* 26*   CREATININE mg/dL 1.23 1.28* 1.48* 1.37*   GLUCOSE mg/dL 328* 327* 403* 179*     Cr Clearance Estimated Creatinine Clearance: 71.2 mL/min (by C-G formula based on SCr of 1.23 mg/dL).  Coag     HbA1C   Lab Results   Component Value Date    HGBA1C 8.5 (H) 06/02/2022    HGBA1C 8.5 (H) 04/19/2022    HGBA1C 6.7 (H) 09/28/2021     Blood Glucose   Glucose   Date/Time Value Ref Range Status   06/05/2022 0709 309 (H) 70 - 105 mg/dL Final     Comment:     Serial Number: [id number]:  [id number]   06/04/2022 2034 235 (H) 70 - 105 mg/dL Final     Comment:     Serial Number: [id number]:  [id number]   06/04/2022 1628 183 (H) 70 -  105 mg/dL Final     Comment:     Serial Number: [id number]:  [id number]   06/04/2022 1131 319 (H) 70 - 105 mg/dL Final     Comment:     Serial Number: [id number]:  [id number]   06/04/2022 0733 323 (H) 70 - 105 mg/dL Final     Comment:     Serial Number: [id number]:  [id number]   06/03/2022 2116 295 (H) 70 - 105 mg/dL Final     Comment:     Serial Number: [id number]:  [id number]   06/03/2022 1606 422 (C) 70 - 105 mg/dL Final     Comment:     Serial Number: [id number]:  [id number]   06/03/2022 1147 314 (H) 70 - 105 mg/dL Final     Comment:     Serial Number: [id number]:  [id number]     Infection     CMP   Results from last 7 days   Lab Units 06/05/22  0511 06/04/22  0457 06/03/22  0456 06/02/22  0953   SODIUM mmol/L 131* 129* 131* 134*   POTASSIUM mmol/L 5.7* 5.6* 4.8 4.7   CHLORIDE mmol/L 97* 98 97* 96*   CO2 mmol/L 23.0 21.0* 20.0* 25.0   BUN mg/dL 39* 44* 37* 26*   CREATININE mg/dL 1.23 1.28* 1.48* 1.37*   GLUCOSE mg/dL 328* 327* 403* 179*     UA      Radiology(recent) No radiology results for the last day   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  Community-acquired pneumonia present upon admission, right lower lobe likely gram-negative species  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  Diabetes mellitus type 2 with renal manifestations  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  Chronic oral anticoagulation  Diabetic dyslipidemia  Chronic kidney disease stage IIIa  History of intermittent bowel obstruction secondary to massive ventral hernia  History of right renal mass  3.4 cm infrarenal abdominal aortic aneurysm  Opioid dependence  Hypertension associated chronic kidney disease stage IIIa  Hypertensive nephropathy and diabetic glomerulosclerosis  Panlobular COPD with emphysema  Monoclonal paraproteinemia  Valvular heart disease with significant mitral regurgitation  Tricuspid  regurgitation, mild  Paroxysmal atrial fibrillation  HFrEF with acute exacerbation  Hypercoagulable state secondary to atrial fibrillation as above  RLS  Chronic hypoxic respiratory failure  Supplemental oxygen dependency  Secondary thrombophilia  History of prostate cancer  Atherosclerotic heart disease of native coronary arteries with angina pectoris  Chronic mucopurulent bronchitis  Morbid exogenous obesity  Hypoventilation apnea syndrome with ARTI  Herbert's esophageal change  Gastroesophageal reflux disease with esophagitis  Liver cirrhosis  History of alcoholism  Nicotine dependency with nicotine use disorder, cigarettes  Peripheral polyneuropathy secondary to alcohol and diabetes  Autonomic neuropathy secondary to diabetes  B12 deficiency  PAD  Vitamin D deficiency  Dependency upon positive pressure ventilation apparatus  History of pacemaker placement  History of percutaneous coronary intervention with stent placement         Plan:  Continue present approach//steroid taper//monitor potassium        Yusuf Jones MD  06/05/22  08:32 EDT

## 2022-06-06 NOTE — CASE MANAGEMENT/SOCIAL WORK
Case Management Discharge Note      Final Note: [facility]        Home Medical Care Coordination complete.    Service Provider Selected Services Address Phone Fax Patient Preferred    [facility]  Home Health Services [address] [address] [phone] --         Transportation Services  Private: Car    Final Discharge Disposition Code: 06 - home with home health care

## 2022-06-06 NOTE — DISCHARGE SUMMARY
Date of Discharge:  6/6/2022    Discharge Diagnosis:  Dyspnea  COPD  CHF  PVD  Diabetes Mellitus   ARTI  Prostate cancer  Hypertension  Cirrhosis      Presenting Problem/History of Present Illness  Active Hospital Problems    Diagnosis  POA   • Dyspnea, unspecified type [R06.00]  Yes      Resolved Hospital Problems   No resolved problems to display.          Hospital Course  Patient is a 76 y.o. male presented with multiple medical problems including COPD, CHF, chronic hypoxemic respiratory failure, coronary artery disease who presented to the ED on 6/2/22 with shortness of breath. He was admitted with acute exacerbation of COPD and treated with antibiotics, oxygen, steroids, and nebulizer. He improved and was hemodynamically stable and agreeable to discharge    Procedures Performed         Consults:   Consults     No orders found from 5/4/2022 to 6/3/2022.          Pertinent Test Results:    Lab Results (most recent)     Procedure Component Value Units Date/Time    POC Glucose Once [[id number]]  (Abnormal) Collected: 06/06/22 0728    Specimen: Blood Updated: 06/06/22 0729     Glucose 271 mg/dL      Comment: Serial Number: [id number]:  [id number]       Basic Metabolic Panel [[id number]]  (Abnormal) Collected: 06/06/22 0438    Specimen: Blood Updated: 06/06/22 0537     Glucose 338 mg/dL      BUN 41 mg/dL      Creatinine 1.24 mg/dL      Sodium 133 mmol/L      Potassium 4.7 mmol/L      Chloride 97 mmol/L      CO2 25.0 mmol/L      Calcium 9.0 mg/dL      BUN/Creatinine Ratio 33.1     Anion Gap 11.0 mmol/L      eGFR 60.6 mL/min/1.73      Comment: National Kidney Foundation and American Society of Nephrology (ASN) Task Force recommended calculation based on the Chronic Kidney Disease Epidemiology Collaboration (CKD-EPI) equation refit without adjustment for race.       Narrative:      GFR Normal >60  Chronic Kidney Disease <60  Kidney Failure <15      CBC & Differential [[id number]]  (Abnormal) Collected: 06/06/22 0438     Specimen: Blood Updated: 06/06/22 0513    Narrative:      The following orders were created for panel order CBC & Differential.  Procedure                               Abnormality         Status                     ---------                               -----------         ------                     CBC Auto Differential[[id number]]        Abnormal            Final result                 Please view results for these tests on the individual orders.    CBC Auto Differential [[id number]]  (Abnormal) Collected: 06/06/22 0438    Specimen: Blood Updated: 06/06/22 0513     WBC 9.40 10*3/mm3      RBC 4.66 10*6/mm3      Hemoglobin 12.6 g/dL      Hematocrit 39.4 %      MCV 84.7 fL      MCH 27.0 pg      MCHC 31.9 g/dL      RDW 18.7 %      RDW-SD 55.6 fl      MPV 8.7 fL      Platelets 332 10*3/mm3      Neutrophil % 84.2 %      Lymphocyte % 8.1 %      Monocyte % 7.3 %      Eosinophil % 0.2 %      Basophil % 0.2 %      Neutrophils, Absolute 7.90 10*3/mm3      Lymphocytes, Absolute 0.80 10*3/mm3      Monocytes, Absolute 0.70 10*3/mm3      Eosinophils, Absolute 0.00 10*3/mm3      Basophils, Absolute 0.00 10*3/mm3      nRBC 0.1 /100 WBC     POC Glucose Once [[id number]]  (Abnormal) Collected: 06/05/22 2119    Specimen: Blood Updated: 06/05/22 2121     Glucose 304 mg/dL      Comment: Serial Number: [id number]:  [id number]       Potassium [[id number]]  (Normal) Collected: 06/05/22 1111    Specimen: Blood Updated: 06/05/22 1159     Potassium 4.2 mmol/L     Basic Metabolic Panel [[id number]]  (Abnormal) Collected: 06/05/22 0511    Specimen: Blood Updated: 06/05/22 0645     Glucose 328 mg/dL      BUN 39 mg/dL      Creatinine 1.23 mg/dL      Sodium 131 mmol/L      Potassium 5.7 mmol/L      Chloride 97 mmol/L      CO2 23.0 mmol/L      Calcium 8.8 mg/dL      BUN/Creatinine Ratio 31.7     Anion Gap 11.0 mmol/L      eGFR 61.2 mL/min/1.73      Comment: National Kidney Foundation and American Society of Nephrology (ASN) Task Force  recommended calculation based on the Chronic Kidney Disease Epidemiology Collaboration (CKD-EPI) equation refit without adjustment for race.       Narrative:      GFR Normal >60  Chronic Kidney Disease <60  Kidney Failure <15      CBC & Differential [[id number]]  (Abnormal) Collected: 06/05/22 0511    Specimen: Blood Updated: 06/05/22 0531    Narrative:      The following orders were created for panel order CBC & Differential.  Procedure                               Abnormality         Status                     ---------                               -----------         ------                     CBC Auto Differential[[id number]]        Abnormal            Final result                 Please view results for these tests on the individual orders.    CBC Auto Differential [[id number]]  (Abnormal) Collected: 06/05/22 0511    Specimen: Blood Updated: 06/05/22 0531     WBC 12.40 10*3/mm3      RBC 4.40 10*6/mm3      Hemoglobin 12.0 g/dL      Hematocrit 37.8 %      MCV 86.1 fL      MCH 27.2 pg      MCHC 31.6 g/dL      RDW 18.7 %      RDW-SD 56.4 fl      MPV 8.6 fL      Platelets 302 10*3/mm3      Neutrophil % 88.6 %      Lymphocyte % 5.3 %      Monocyte % 6.1 %      Eosinophil % 0.0 %      Basophil % 0.0 %      Neutrophils, Absolute 11.00 10*3/mm3      Lymphocytes, Absolute 0.70 10*3/mm3      Monocytes, Absolute 0.80 10*3/mm3      Eosinophils, Absolute 0.00 10*3/mm3      Basophils, Absolute 0.00 10*3/mm3      nRBC 0.0 /100 WBC     Troponin [[id number]]  (Normal) Collected: 06/03/22 0456    Specimen: Blood Updated: 06/03/22 0542     Troponin T <0.010 ng/mL     Narrative:      Troponin T Reference Range:  <= 0.03 ng/mL-   Negative for AMI  >0.03 ng/mL-     Abnormal for myocardial necrosis.  Clinicians would have to utilize clinical acumen, EKG, Troponin and serial changes to determine if it is an Acute Myocardial Infarction or myocardial injury due to an underlying chronic condition.       Results may be falsely decreased if  patient taking Biotin.      Hemoglobin A1c [[id number]]  (Abnormal) Collected: 06/02/22 0953    Specimen: Blood Updated: 06/02/22 2049     Hemoglobin A1C 8.5 %     Narrative:      Hemoglobin A1C Reference Range:    <5.7 %        Normal  5.7-6.4 %     Increased risk for diabetes  > 6.4 %        Diabetes       These guidelines have been recommended by the American Diabetic Association for Hgb A1c.      The following 2010 guidelines have been recommended by the American Diabetes Association for Hemoglobin A1c.    HBA1c 5.7-6.4% Increased risk for future diabetes (pre-diabetes)  HBA1c     >6.4% Diabetes      Troponin [[id number]]  (Normal) Collected: 06/02/22 1608    Specimen: Blood Updated: 06/02/22 1632     Troponin T <0.010 ng/mL     Narrative:      Troponin T Reference Range:  <= 0.03 ng/mL-   Negative for AMI  >0.03 ng/mL-     Abnormal for myocardial necrosis.  Clinicians would have to utilize clinical acumen, EKG, Troponin and serial changes to determine if it is an Acute Myocardial Infarction or myocardial injury due to an underlying chronic condition.       Results may be falsely decreased if patient taking Biotin.      COVID PRE-OP / PRE-PROCEDURE SCREENING ORDER (NO ISOLATION) - Swab, Nasopharynx [[id number]]  (Normal) Collected: 06/02/22 1454    Specimen: Swab from Nasopharynx Updated: 06/02/22 1528    Narrative:      The following orders were created for panel order COVID PRE-OP / PRE-PROCEDURE SCREENING ORDER (NO ISOLATION) - Swab, Nasopharynx.  Procedure                               Abnormality         Status                     ---------                               -----------         ------                     COVID-19,CEPHEID/BRITTANI,CO...[[id number]]  Normal              Final result                 Please view results for these tests on the individual orders.    COVID-19,CEPHEID/BRITTANI,COR/SMOOTH/PAD/SARAI IN-HOUSE(OR EMERGENT/ADD-ON),NP SWAB IN TRANSPORT MEDIA 3-4 HR TAT, RT-PCR - Swab, Nasopharynx [[id number]]   (Normal) Collected: 06/02/22 1454    Specimen: Swab from Nasopharynx Updated: 06/02/22 1528     COVID19 Not Detected    Narrative:      Fact sheet for providers: https://www.fda.gov/media/899081/download     Fact sheet for patients: https://www.fda.gov/media/784580/download  Fact sheet for providers: https://www.fda.gov/media/950210/download    Fact sheet for patients: https://www.fda.gov/media/885944/download    Test performed by PCR.    Blood Gas, Arterial - [[id number]]  (Abnormal) Collected: 06/02/22 0956    Specimen: Arterial Blood Updated: 06/02/22 0958     Site Right Radial     Moise's Test Positive     pH, Arterial 7.428 pH units      pCO2, Arterial 36.9 mm Hg      pO2, Arterial 72.6 mm Hg      HCO3, Arterial 24.4 mmol/L      Base Excess, Arterial 0.3 mmol/L      Comment: Serial Number: [id number]:  [id number]        O2 Saturation, Arterial 94.9 %      CO2 Content 25.5 mmol/L      Barometric Pressure for Blood Gas --     Comment: N/A        Modality Cannula     FIO2 <21 %      Hemodilution No           Results for orders placed during the hospital encounter of 04/19/22    Adult Transthoracic Echo Complete W/ Cont if Necessary Per Protocol    Interpretation Summary  · Estimated left ventricular EF = 40% Left ventricular systolic function is mildly decreased.    Indications  Heart failure    Technically satisfactory study.  Mitral valve is structurally normal.  Moderate mitral regurgitation is present.  Tricuspid valve is structurally normal.  Moderate tricuspid regurgitation.  Calculated pulmonary artery pressure is 40 mmHg.  Aortic valve is structurally normal.  Pulmonic valve could not be well visualized.  No evidence for aortic regurgitation is seen by Doppler study.  Biatrial enlargement.  Left ventricle is normal in size and septal apical hypokinesis with ejection fraction of 40%.  Right ventricle is normal in size.  Atrial septum is intact.  Aorta is normal.  No pericardial effusion or intracardiac  thrombus is seen.    Impression  Moderate mitral and tricuspid regurgitation.  Biatrial enlargement.  Calculated pulmonary artery pressure is 40 mmHg.  Left ventricular apical and septal hypokinesis with ejection fraction of 40%.       Condition on Discharge:  Stable    Vital Signs  Temp:  [96.7 °F (35.9 °C)-97.5 °F (36.4 °C)] 97.4 °F (36.3 °C)  Heart Rate:  [70-81] 72  Resp:  [16-20] 17  BP: (106-143)/(50-87) 106/50      Physical Exam  Vitals reviewed.   Constitutional:       Appearance: He is not ill-appearing.   HENT:      Head: Normocephalic and atraumatic.      Right Ear: External ear normal.      Left Ear: External ear normal.      Nose: Nose normal.      Mouth/Throat:      Mouth: Mucous membranes are moist.   Eyes:      General:         Right eye: No discharge.         Left eye: No discharge.   Cardiovascular:      Rate and Rhythm: Normal rate and regular rhythm.      Pulses: Normal pulses.      Heart sounds: Normal heart sounds.   Pulmonary:      Effort: Pulmonary effort is normal.      Breath sounds: Normal breath sounds.   Abdominal:      General: Bowel sounds are normal.      Palpations: Abdomen is soft.   Musculoskeletal:         General: Normal range of motion.      Cervical back: Normal range of motion.   Skin:     General: Skin is warm and dry.   Neurological:      Mental Status: He is alert and oriented to person, place, and time.   Psychiatric:         Behavior: Behavior normal.              Discharge Disposition  Home or Self Care    Discharge Medications     Discharge Medications      New Medications      Instructions Start Date   amoxicillin-clavulanate 875-125 MG per tablet  Commonly known as: AUGMENTIN   1 tablet, Oral, Every 12 Hours Scheduled      docusate sodium 100 MG capsule   100 mg, Oral, 2 Times Daily      famotidine 20 MG tablet  Commonly known as: PEPCID   20 mg, Oral, 2 Times Daily Before Meals      predniSONE 10 MG tablet  Commonly known as: DELTASONE   10 mg, Oral, Daily          Changes to Medications      Instructions Start Date   oxyCODONE-acetaminophen  MG per tablet  Commonly known as: Percocet  What changed: Another medication with the same name was removed. Continue taking this medication, and follow the directions you see here.   1 tablet, Oral, Every 6 Hours PRN         Continue These Medications      Instructions Start Date   apixaban 5 MG tablet tablet  Commonly known as: ELIQUIS   5 mg, Oral, 2 Times Daily      aspirin 81 MG EC tablet   81 mg, Oral, Daily      atorvastatin 40 MG tablet  Commonly known as: LIPITOR   40 mg, Oral, Every Morning      benzonatate 100 MG capsule  Commonly known as: TESSALON   100 mg, Oral, 3 Times Daily PRN      budesonide 0.5 MG/2ML nebulizer solution  Commonly known as: PULMICORT   0.5 mg, Nebulization, 2 Times Daily PRN      furosemide 40 MG tablet  Commonly known as: LASIX   40 mg, Oral, Every Morning      glimepiride 2 MG tablet  Commonly known as: AMARYL   2 mg, Oral, Every Morning Before Breakfast      ipratropium-albuterol 0.5-2.5 mg/3 ml nebulizer  Commonly known as: DUO-NEB   3 mL, Nebulization, 3 Times Daily - RT      isosorbide mononitrate 120 MG 24 hr tablet  Commonly known as: IMDUR   120 mg, Oral, Every 24 Hours Scheduled      metOLazone 5 MG tablet  Commonly known as: ZAROXOLYN   5 mg, Oral, Take As Directed, Monday Wednesday Friday      metoprolol tartrate 25 MG tablet  Commonly known as: LOPRESSOR   25 mg, Oral, 2 Times Daily      mirtazapine 30 MG tablet  Commonly known as: REMERON   30 mg, Oral, Nightly      nitroglycerin 0.4 MG SL tablet  Commonly known as: NITROSTAT   0.4 mg, Sublingual, Every 5 Minutes PRN      pregabalin 150 MG capsule  Commonly known as: LYRICA   150 mg, Oral, 2 Times Daily      ranolazine 500 MG 12 hr tablet  Commonly known as: RANEXA   500 mg, Oral, 2 Times Daily      rOPINIRole 0.5 MG tablet  Commonly known as: REQUIP   0.5 mg, Oral, Nightly, Take 1 hour before bedtime.      traZODone 100 MG  tablet  Commonly known as: DESYREL   100 mg, Oral, Nightly      Ventolin  (90 Base) MCG/ACT inhaler  Generic drug: albuterol sulfate HFA   2 puffs, Inhalation, Every 6 Hours PRN      vitamin D 1.25 MG (41793 UT) capsule capsule  Commonly known as: ERGOCALCIFEROL   50,000 Units, Oral, Weekly             Discharge Diet:   Diet Instructions     Diet: Consistent Carbohydrate, Cardiac      Discharge Diet:  Consistent Carbohydrate  Cardiac             Activity at Discharge:   Activity Instructions     Activity as Tolerated            Follow-up Appointments  Future Appointments   Date Time Provider Department Center   7/15/2022  9:00 AM Herberth Oconnor MD [facility]   8/30/2022 11:00 AM [facility]   8/30/2022 11:40 AM Celine Swanson MD [facility]     Additional Instructions for the Follow-ups that You Need to Schedule     Discharge Follow-up with PCP   As directed       Currently Documented PCP:    Yusuf Jones MD    PCP Phone Number:    [phone]     Follow Up Details: 3 weeks               Test Results Pending at Discharge       KOURTNEY Warner  06/06/22  14:07 EDT    Time: Discharge 25 min

## 2022-06-06 NOTE — PLAN OF CARE
Goal Outcome Evaluation:  Plan of Care Reviewed With: patient        Progress: improving  Outcome Evaluation: pt feels better today with the exception of his back pain. pt will be discharged home with a follow up in the office. continue to monitor

## 2022-06-06 NOTE — PLAN OF CARE
Goal Outcome Evaluation:   Fall precautions maintained. Pain managed with PRN Dilaudid as ordrered. Pt tolerating Room air without difficulty. Anticipating discharge home today.

## 2022-06-07 NOTE — OUTREACH NOTE
Prep Survey    Flowsheet Row Responses   [facility] facility patient discharged from? [facility]   Is LACE score < 7 ? No   Emergency Room discharge w/ pulse ox? No   Eligibility Readm Mgmt   Discharge diagnosis acute exacerbation of COPD   Does the patient have one of the following disease processes/diagnoses(primary or secondary)? COPD/Pneumonia   Does the patient have Home health ordered? Yes   What is the Home health agency?  [facility]    Is there a DME ordered? No   Prep survey completed? Yes          MUNIRA VILLASENOR - Registered Nurse

## 2022-06-08 NOTE — OUTREACH NOTE
COPD/PN Week 1 Survey    Flowsheet Row Responses   [facility] patient discharged from? [facility]   Does the patient have one of the following disease processes/diagnoses(primary or secondary)? COPD/Pneumonia   Was the primary reason for admission: Pneumonia   Week 1 attempt successful? Yes   Call start time 1633   Call end time 1637   Discharge diagnosis acute exacerbation of COPD   Is patient permission given to speak with other caregiver? Yes   List who call center can speak with Meghan   Person spoke with today (if not patient) and relationship Meghan wife   Meds reviewed with patient/caregiver? Yes   Is the patient having any side effects they believe may be caused by any medication additions or changes? No   Does the patient have all medications ordered at discharge? Yes   Is the patient taking all medications as directed (includes completed medication regime)? Yes   Medication comments Finishing up antibx   Does the patient have a primary care provider?  Yes   Does the patient have an appointment with their PCP or specialist within 7 days of discharge? Greater than 7 days   What is preventing the patient from scheduling follow up appointments within 7 days of discharge? Earlier appointment not available   Nursing Interventions Educated patient on importance of making appointment   Has the patient kept scheduled appointments due by today? N/A   Comments Has an appt in about 2 weeks   Pulse Ox monitoring None   Psychosocial issues? No   Did the patient receive a copy of their discharge instructions? Yes   Nursing interventions Reviewed instructions with patient   What is the patient's perception of their health status since discharge? Same   Nursing Interventions Nurse provided patient education   Are the patient's immunizations up to date?  Yes   Nursing interventions Educated on importance of maintaining up to date immunizations as advised by provider   If the patient is a current smoker, are they able to teach  back resources for cessation? Not a smoker   Is the patient/caregiver able to teach back the hierarchy of who to call/visit for symptoms/problems? PCP, Specialist, Home health nurse, Urgent Care, ED, 911 Yes   Additional teach back comments Wife states he still has a cough and is coughing up drainage. She states he has been drinking plenty of water to thin out secretions.    Patient reports what zone on this call? Yellow Zone   Yellow Zone Unable to complete daily activities, Increased or thicker phlegm/mucus   Yellow interventions Continue to use daily medications, Use other meds such as steroids or antibiotics as ordered, Get plenty of rest, Call provider immediatly if symptoms do not improve   Week 1 call completed? Yes          KARINA PARSON - Registered Nurse

## 2022-06-15 PROBLEM — E11.40 CONTROLLED TYPE 2 DIABETES WITH NEUROPATHY (HCC): Status: ACTIVE | Noted: 2020-10-26

## 2022-06-15 PROBLEM — E11.69 TYPE 2 DIABETES MELLITUS WITH HYPERLIPIDEMIA (HCC): Status: ACTIVE | Noted: 2020-10-26

## 2022-06-15 PROBLEM — J18.9 PNEUMONIA OF RIGHT LUNG DUE TO INFECTIOUS ORGANISM, UNSPECIFIED PART OF LUNG: Status: ACTIVE | Noted: 2022-01-01

## 2022-06-15 PROBLEM — I10 DIABETES MELLITUS WITH COINCIDENT HYPERTENSION: Status: ACTIVE | Noted: 2020-10-26

## 2022-06-15 PROBLEM — I50.30 (HFPEF) HEART FAILURE WITH PRESERVED EJECTION FRACTION (HCC): Status: ACTIVE | Noted: 2022-01-01

## 2022-06-15 PROBLEM — M54.50 CHRONIC LOW BACK PAIN: Status: ACTIVE | Noted: 2022-01-01

## 2022-06-15 PROBLEM — J44.1 COPD WITH EXACERBATION (HCC): Status: ACTIVE | Noted: 2019-12-04

## 2022-06-15 PROBLEM — E78.5 TYPE 2 DIABETES MELLITUS WITH HYPERLIPIDEMIA (HCC): Status: ACTIVE | Noted: 2020-10-26

## 2022-06-15 PROBLEM — G89.29 CHRONIC LOW BACK PAIN: Status: ACTIVE | Noted: 2022-01-01

## 2022-06-15 NOTE — ED PROVIDER NOTES
Subjective   76-year-old male with a history of CHF and COPD states he felt moderately shaky and fatigued today.  Patient was ambulating in his house and his legs felt weak and he went down to his knees with dyspnea.  Paramedics oxygen saturation was 73%.  Patient states he normally does not wear supplemental oxygen.  Patient denies any cough or chest pain or fever.  No abdominal symptoms or urinary symptoms.  Symptoms worse with exertion.          Review of Systems   Constitutional: Positive for fatigue.   HENT: Negative.    Respiratory: Positive for shortness of breath.    Gastrointestinal: Negative.    Genitourinary: Negative.    Musculoskeletal: Negative.    Skin: Negative.    Neurological: Positive for tremors.   Psychiatric/Behavioral: Negative.    All other systems reviewed and are negative.      Past Medical History:   Diagnosis Date   • Alcoholic cirrhosis of liver with ascites ([facility]) 10/26/2020   • Benign hypertension with CKD (chronic kidney disease) stage III ([facility]) 10/26/2020   • Bruises easily    • CHF (congestive heart failure) ([facility])    • Chronic bronchitis with COPD (chronic obstructive pulmonary disease) ([facility]) 10/26/2020   • Chronic respiratory failure with hypoxia ([facility]) 10/26/2020   • Congenital heart defect    • Difficulty attaining erection    • Heart block    • Hernia of abdominal wall    • Hypertension    • Low back pain    • Pacemaker    • Peripheral vascular disease due to secondary diabetes ([facility]) 10/26/2020   • Poor circulation    • Prostate cancer ([facility])     prostate   • Shortness of breath    • Sleep apnea     using CPAP    • Stage 3a chronic kidney disease ([facility]) 10/26/2020   • Stented coronary artery 12/05/2019    MICHAEL to LAD   • Type 2 diabetes, controlled, with neuropathy ([facility]) 10/26/2020    no meds   • Type 2 diabetes, controlled, with neuropathy ([facility]) 10/26/2020       Allergies   Allergen Reactions   • Haloperidol Hives   • Morphine Itching   • Pantoprazole Other (See Comments)     Feels  sick after receiving   • Latex Unknown - High Severity       Past Surgical History:   Procedure Laterality Date   • ABDOMINAL SURGERY     • APPENDECTOMY     • BONE CURETTAGE Right 1/22/2021    Procedure: Wound excision with fifth metatarsal head resection, right foot.;  Surgeon: Josef Egan DPM;  Location: [facility] MAIN OR;  Service: Podiatry;  Laterality: Right;   • BONE INCISION AND DRAINAGE Right 10/5/2020    Procedure: Incision and drainage with debridement of all nonviable soft tissue and bone with bone biopsy, right foot.;  Surgeon: Josef Egan DPM;  Location: [facility] MAIN OR;  Service: Podiatry;  Laterality: Right;   • BRONCHOSCOPY N/A 4/23/2022    Procedure: BRONCHOSCOPY with bronchioalveolar lavage of right lower lobe;  Surgeon: Edwin Kline MD;  Location: [facility] ENDOSCOPY;  Service: Pulmonary;  Laterality: N/A;  pneumonia   • CARDIAC CATHETERIZATION N/A 12/5/2019    Procedure: Left Heart Cath;  Surgeon: Mirza Munson MD;  Location: [facility] CATH INVASIVE LOCATION;  Service: Cardiology   • CARDIAC CATHETERIZATION N/A 12/5/2019    Procedure: Stent MICHAEL coronary;  Surgeon: Mirza Munson MD;  Location: [facility] CATH INVASIVE LOCATION;  Service: Cardiology   • CARDIAC CATHETERIZATION N/A 10/18/2021    Procedure: Left Heart Cath and coronary angiogram;  Surgeon: Celine Swanson MD;  Location: [facility] CATH INVASIVE LOCATION;  Service: Cardiovascular;  Laterality: N/A;   • CARDIAC CATHETERIZATION N/A 10/18/2021    Procedure: Right Heart Cath;  Surgeon: Celine Swanson MD;  Location: [facility] CATH INVASIVE LOCATION;  Service: Cardiovascular;  Laterality: N/A;   • CHOLECYSTECTOMY     • ELBOW PROCEDURE      left   • FOOT SURGERY      right foot   • HERNIA REPAIR     • INCISION AND DRAINAGE OF WOUND Right 4/6/2021    Procedure: INCISION AND DRAINAGE OF RIGHT FOOT 5TH METATARSAL TO BONE AND PARTIAL 5TH METATARSAL RESECTION;  Surgeon: Josef Egan DPM;  Location: [facility] MAIN OR;   Service: Podiatry;  Laterality: Right;   • INCISION AND DRAINAGE OF WOUND Right 2021    Procedure: INCISION AND DRAINAGE BONE, DELAYED PRIMARY CLOSURE;  Surgeon: Josef Egan DPM;  Location: [facility] MAIN OR;  Service: Podiatry;  Laterality: Right;   • INTERVENTIONAL RADIOLOGY PROCEDURE N/A 2019    Procedure: Intravascular Ultrasound;  Surgeon: Mirza Munson MD;  Location: [facility] CATH INVASIVE LOCATION;  Service: Cardiology   • PACEMAKER IMPLANTATION     • IL RT/LT HEART CATHETERS N/A 2019    Procedure: Percutaneous Coronary Intervention;  Surgeon: Mirza Munson MD;  Location: [facility] CATH INVASIVE LOCATION;  Service: Cardiology   • WOUND DEBRIDEMENT Right 10/29/2020    Procedure: Preparation and debridement of foot wound with application of Integra wound graft, right foot.;  Surgeon: Josef Egan DPM;  Location: [facility] MAIN OR;  Service: Podiatry;  Laterality: Right;   • WOUND DEBRIDEMENT N/A 2021    Procedure: EXCISIONAL ABDOMINAL WOUND  DEBRIDEMENT;  Surgeon: Cleopatra Wang MD;  Location: [facility] MAIN OR;  Service: General;  Laterality: N/A;   • WOUND DEBRIDEMENT N/A 2021    Procedure: DEBRIDEMENT OF ABDOMINAL WALL;  Surgeon: Hill Bhatia DO;  Location: [facility] MAIN OR;  Service: General;  Laterality: N/A;       Family History   Problem Relation Age of Onset   • Alzheimer's disease Mother    • GI problems Father    • Heart disease Father        Social History     Socioeconomic History   • Marital status:    Tobacco Use   • Smoking status: Former Smoker     Years: 15.00     Types: Cigarettes     Start date: 1966     Quit date: 5/10/2020     Years since quittin.0   • Smokeless tobacco: Never Used   Vaping Use   • Vaping Use: Never used   Substance and Sexual Activity   • Alcohol use: Yes     Alcohol/week: 4.0 standard drinks     Types: 4 Shots of liquor per week     Comment: occasionally   • Drug use: No   • Sexual activity: Defer            Objective   Physical Exam  Constitutional:       Comments: 76-year-old male no acute distress, alert and appropriate   HENT:      Head: Normocephalic and atraumatic.      Mouth/Throat:      Mouth: Mucous membranes are moist.      Pharynx: Oropharynx is clear.   Eyes:      Extraocular Movements: Extraocular movements intact.      Pupils: Pupils are equal, round, and reactive to light.   Cardiovascular:      Rate and Rhythm: Normal rate and regular rhythm.      Pulses: Normal pulses.      Heart sounds: Normal heart sounds.   Pulmonary:      Effort: Pulmonary effort is normal.      Comments: Wheezes and rhonchi bilaterally, mild  Abdominal:      General: Bowel sounds are normal.      Palpations: Abdomen is soft.      Comments: Several ventral hernias, nontender   Musculoskeletal:      Cervical back: Normal range of motion and neck supple.      Comments: 2+ symmetric pedal edema, chronic per patient, no calf tenderness, mild bilateral anterior knee abrasions   Skin:     General: Skin is warm and dry.      Capillary Refill: Capillary refill takes less than 2 seconds.   Neurological:      Mental Status: He is oriented to person, place, and time.      Cranial Nerves: No cranial nerve deficit.      Sensory: No sensory deficit.      Motor: No weakness.   Psychiatric:         Mood and Affect: Mood normal.         Behavior: Behavior normal.         Procedures           ED Course  ED Course as of 06/15/22 0407   Wed Margarito 15, 2022   0121 EKG interpretation: Paced rhythm, rate 71 [JR]      ED Course User Index  [JR] Hill Whitfield MD                                                 [facility]  Number of Diagnoses or Management Options  Abrasion of knee, unspecified laterality, initial encounter  COPD exacerbation (HCC)  Pneumonia of right lung due to infectious organism, unspecified part of lung  Diagnosis management comments: Results for orders placed or performed during the hospital encounter of 06/15/22  -Respiratory Panel  PCR w/COVID-19(SARS-CoV-2) MICHELET/JONATHAN/SMOOTH/PAD/COR/MAD/RANCHO In-House, NP Swab in UTM/VTM, 3-4 HR TAT - Swab, Nasopharynx:   Specimen: Nasopharynx; Swab       Result                      Value             Ref Range           ADENOVIRUS, PCR             Not Detected      Not Detected        Coronavirus 229E            Not Detected      Not Detected        Coronavirus HKU1            Not Detected      Not Detected        Coronavirus NL63            Not Detected      Not Detected        Coronavirus OC43            Not Detected      Not Detected        COVID19                     Not Detected      Not Detected*       Human Metapneumovirus       Not Detected      Not Detected        Human Rhinovirus/Enter*     Not Detected      Not Detected        Influenza A PCR             Not Detected      Not Detected        Influenza B PCR             Not Detected      Not Detected        Parainfluenza Virus 1       Not Detected      Not Detected        Parainfluenza Virus 2       Not Detected      Not Detected        Parainfluenza Virus 3       Not Detected      Not Detected        Parainfluenza Virus 4       Not Detected      Not Detected        RSV, PCR                    Not Detected      Not Detected        Bordetella pertussis p*     Not Detected      Not Detected        Bordetella parapertuss*     Not Detected      Not Detected        Chlamydophila pneumoni*     Not Detected      Not Detected        Mycoplasma pneumo by P*     Not Detected      Not Detected   -Comprehensive Metabolic Panel:   Specimen: Blood       Result                      Value             Ref Range           Glucose                     234 (H)           65 - 99 mg/dL       BUN                         25 (H)            8 - 23 mg/dL        Creatinine                  1.27              0.76 - 1.27 *       Sodium                      135 (L)           136 - 145 mm*       Potassium                   4.8               3.5 - 5.2 mm*       Chloride                     96 (L)            98 - 107 mmo*       CO2                         27.0              22.0 - 29.0 *       Calcium                     8.8               8.6 - 10.5 m*       Total Protein               6.6               6.0 - 8.5 g/*       Albumin                     3.40 (L)          3.50 - 5.20 *       ALT (SGPT)                  7                 1 - 41 U/L          AST (SGOT)                  10                1 - 40 U/L          Alkaline Phosphatase        119 (H)           39 - 117 U/L        Total Bilirubin             0.4               0.0 - 1.2 mg*       Globulin                    3.2               gm/dL               A/G Ratio                   1.1               g/dL                BUN/Creatinine Ratio        19.7              7.0 - 25.0          Anion Gap                   12.0              5.0 - 15.0 m*       eGFR                        58.9 (L)          >60.0 mL/min*  -Troponin:   Specimen: Blood       Result                      Value             Ref Range           Troponin T                  <0.010            0.000 - 0.03*  -D-dimer, Quantitative:   Specimen: Blood       Result                      Value             Ref Range           D-Dimer, Quantitative       1.04 (H)          0.00 - 0.59 *  -BNP:   Specimen: Blood       Result                      Value             Ref Range           proBNP                      515.8             0.0 - 1,800.*  -CBC Auto Differential:   Specimen: Blood       Result                      Value             Ref Range           WBC                         10.10             3.40 - 10.80*       RBC                         4.58              4.14 - 5.80 *       Hemoglobin                  12.4 (L)          13.0 - 17.7 *       Hematocrit                  37.9              37.5 - 51.0 %       MCV                         82.7              79.0 - 97.0 *       MCH                         27.2              26.6 - 33.0 *       MCHC                        32.8              31.5 -  35.7 *       RDW                         17.6 (H)          12.3 - 15.4 %       RDW-SD                      51.2              37.0 - 54.0 *       MPV                         7.9               6.0 - 12.0 fL       Platelets                   318               140 - 450 10*       Neutrophil %                76.0              42.7 - 76.0 %       Lymphocyte %                13.2 (L)          19.6 - 45.3 %       Monocyte %                  6.4               5.0 - 12.0 %        Eosinophil %                3.3               0.3 - 6.2 %         Basophil %                  1.1               0.0 - 1.5 %         Neutrophils, Absolute       7.70 (H)          1.70 - 7.00 *       Lymphocytes, Absolute       1.30              0.70 - 3.10 *       Monocytes, Absolute         0.60              0.10 - 0.90 *       Eosinophils, Absolute       0.30              0.00 - 0.40 *       Basophils, Absolute         0.10              0.00 - 0.20 *       nRBC                        0.0               0.0 - 0.2 /1*  -Procalcitonin:   Specimen: Blood       Result                      Value             Ref Range           Procalcitonin               0.08              0.00 - 0.25 *  -Urinalysis With Culture If Indicated - Urine, Clean Catch:   Specimen: Urine, Clean Catch       Result                      Value             Ref Range           Color, UA                   Yellow            Yellow, Straw       Appearance, UA              Clear             Clear               pH, UA                      7.0               5.0 - 8.0           Specific [place], UA        1.021             1.005 - 1.030       Glucose, UA                                   Negative        >=1000 mg/dL (3+) (A)       Ketones, UA                 Negative          Negative            Bilirubin, UA               Negative          Negative            Blood, UA                   Negative          Negative            Protein, UA                 Trace (A)         Negative             Leuk Esterase, UA           Negative          Negative            Nitrite, UA                 Negative          Negative            Urobilinogen, UA            1.0 E.U./dL       0.2 - 1.0 E.*  -Blood Gas, Arterial -:   Specimen: Arterial Blood       Result                      Value             Ref Range           Site                                                          Right Brachial       Moise's Test                N/A                                   pH, Arterial                7.379             7.350 - 7.45*       pCO2, Arterial              48.6 (H)          35.0 - 48.0 *       pO2, Arterial               86.5              83.0 - 108.0*       HCO3, Arterial              28.7 (H)          21.0 - 28.0 *       Base Excess, Arterial       2.7               0.0 - 3.0 mm*       O2 Saturation, Arterial     96.2              94.0 - 98.0 %       CO2 Content                 30.2 (H)          22 - 29 mmol*       Barometric Pressure fo*                                           Modality                    Cannula                               FIO2                        28                %                   Hemodilution                No                               -POC Glucose Once:   Specimen: Blood       Result                      Value             Ref Range           Glucose                     215 (H)           74 - 100 mg/*  -ECG 12 Lead:        Result                      Value             Ref Range           QT Interval                 482               ms             -Lavender Top:        Result                      Value             Ref Range           Extra Tube                                                   -Light Blue Top:        Result                      Value             Ref Range           Extra Tube                                                    Hold for add-ons.  CT Angiogram Chest Pulmonary Embolism   Final Result        1. No evidence of pulmonary embolism.    2. No evidence of  thoracic aortic aneurysm or dissection.    3. Patchy areas of opacity within the right middle and right lower lobe as above is likely inflammatory or due to an atypical infectious process.    4. Trace right pleural effusion.            Electronically signed by:  Dat Casey D.O.      6/15/2022 1:17 AM     XR Chest 1 View   Final Result        Mild cardiomegaly with no acute findings otherwise seen.        Electronically signed by:  Dat Casey D.O.      6/15/2022 1:18 AM       Patient with right-sided pneumonia with COPD exacerbation.  Patient was treated for community-acquired pneumonia in April with IV Unasyn, will treat as HCAP, treat COPD exacerbation.  On reevaluation, patient is in no distress, has a mild wheeze but is requiring 2 L of oxygen which he normally does not require.       Amount and/or Complexity of Data Reviewed  Clinical lab tests: ordered and reviewed  Tests in the radiology section of CPT®: reviewed and ordered  Tests in the medicine section of CPT®: reviewed and ordered  Decide to obtain previous medical records or to obtain history from someone other than the patient: yes  Discuss the patient with other providers: yes        Final diagnoses:   Pneumonia of right lung due to infectious organism, unspecified part of lung   COPD exacerbation (HCC)   Abrasion of knee, unspecified laterality, initial encounter       ED Disposition  ED Disposition     ED Disposition   Decision to Admit    Condition   --    Comment   Level of Care: Telemetry [5]   Admitting Physician: DONALDO CALLES [[id number]]               No follow-up provider specified.       Medication List      No changes were made to your prescriptions during this visit.          Hill Whitfield MD  06/15/22 040

## 2022-06-15 NOTE — H&P
"Patient Care Team:  Yusuf Jones MD as PCP - General  PhiJoshua MD as Consulting Physician (Cardiology)  Shirley, Moise Kathleen MD as Consulting Physician (Cardiac Electrophysiology)  Izzy Alvarez MD as Consulting Physician (Nephrology)  Celine Swanson MD as Consulting Physician (Cardiology)    Chief complaint  Back pain - cough     Subjective     Patient is a 76 y.o. male presents with hx copd chf with fatigue, weakness and soa . He had a fall \" I went down to my knees\" early this am and presented to the ER. Onset of symptoms was  Last night started with fatigue and cough with soa.  He is not on o2 at home.  Er found R side pneumonia, copd exacerbation and he was admitted for eval and management. He was DC 6/6/22 with  Copd exacerbation . He had an INPT stay April 2022 with pneumonia. He has been admitted today with copd exacerbation and again pneumonia.     Review of Systems   Constitutional: Positive for activity change and fatigue. Negative for appetite change and fever.   HENT: Negative for trouble swallowing.    Eyes: Negative for visual disturbance.   Respiratory: Positive for cough, shortness of breath and wheezing. Negative for choking.    Cardiovascular: Negative for chest pain, palpitations and leg swelling.   Gastrointestinal: Negative for abdominal pain, constipation, diarrhea, nausea and vomiting.   Genitourinary: Negative for difficulty urinating.   Musculoskeletal: Positive for arthralgias and back pain (chronic ).   Skin: Positive for wound (knees).   Neurological: Negative for seizures, syncope and headaches.   Hematological: Negative for adenopathy.   Psychiatric/Behavioral: Negative for confusion.          History  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) 10/26/2020   • Benign hypertension with CKD (chronic kidney disease) stage III (HCC) 10/26/2020   • Bruises easily    • CHF (congestive heart failure) (HCC)  "   • Chronic bronchitis with COPD (chronic obstructive pulmonary disease) ([facility]) 10/26/2020   • Chronic respiratory failure with hypoxia ([facility]) 10/26/2020   • Congenital heart defect    • Difficulty attaining erection    • Heart block    • Hernia of abdominal wall    • Hypertension    • Low back pain    • Pacemaker    • Peripheral vascular disease due to secondary diabetes ([facility]) 10/26/2020   • Poor circulation    • Prostate cancer ([facility])     prostate   • Shortness of breath    • Sleep apnea     using CPAP    • Stage 3a chronic kidney disease ([facility]) 10/26/2020   • Stented coronary artery 12/05/2019    MICHAEL to LAD   • Type 2 diabetes, controlled, with neuropathy ([facility]) 10/26/2020    no meds   • Type 2 diabetes, controlled, with neuropathy ([facility]) 10/26/2020     Past Surgical History:   Procedure Laterality Date   • ABDOMINAL SURGERY     • APPENDECTOMY     • BONE CURETTAGE Right 1/22/2021    Procedure: Wound excision with fifth metatarsal head resection, right foot.;  Surgeon: Josef Egan DPM;  Location: [facility] MAIN OR;  Service: Podiatry;  Laterality: Right;   • BONE INCISION AND DRAINAGE Right 10/5/2020    Procedure: Incision and drainage with debridement of all nonviable soft tissue and bone with bone biopsy, right foot.;  Surgeon: Josef Egan DPM;  Location: [facility] MAIN OR;  Service: Podiatry;  Laterality: Right;   • BRONCHOSCOPY N/A 4/23/2022    Procedure: BRONCHOSCOPY with bronchioalveolar lavage of right lower lobe;  Surgeon: Edwin Kline MD;  Location: [facility] ENDOSCOPY;  Service: Pulmonary;  Laterality: N/A;  pneumonia   • CARDIAC CATHETERIZATION N/A 12/5/2019    Procedure: Left Heart Cath;  Surgeon: Mirza Munson MD;  Location: [facility] CATH INVASIVE LOCATION;  Service: Cardiology   • CARDIAC CATHETERIZATION N/A 12/5/2019    Procedure: Stent MICHAEL coronary;  Surgeon: Mirza Munson MD;  Location: [facility] CATH INVASIVE LOCATION;  Service: Cardiology   • CARDIAC CATHETERIZATION N/A  10/18/2021    Procedure: Left Heart Cath and coronary angiogram;  Surgeon: Celine Swanson MD;  Location: [facility] CATH INVASIVE LOCATION;  Service: Cardiovascular;  Laterality: N/A;   • CARDIAC CATHETERIZATION N/A 10/18/2021    Procedure: Right Heart Cath;  Surgeon: Celine Swanson MD;  Location: [facility] CATH INVASIVE LOCATION;  Service: Cardiovascular;  Laterality: N/A;   • CHOLECYSTECTOMY     • ELBOW PROCEDURE      left   • FOOT SURGERY      right foot   • HERNIA REPAIR     • INCISION AND DRAINAGE OF WOUND Right 4/6/2021    Procedure: INCISION AND DRAINAGE OF RIGHT FOOT 5TH METATARSAL TO BONE AND PARTIAL 5TH METATARSAL RESECTION;  Surgeon: Josef gEan DPM;  Location: [facility] MAIN OR;  Service: Podiatry;  Laterality: Right;   • INCISION AND DRAINAGE OF WOUND Right 4/8/2021    Procedure: INCISION AND DRAINAGE BONE, DELAYED PRIMARY CLOSURE;  Surgeon: Josef Egan DPM;  Location: [facility] MAIN OR;  Service: Podiatry;  Laterality: Right;   • INTERVENTIONAL RADIOLOGY PROCEDURE N/A 12/5/2019    Procedure: Intravascular Ultrasound;  Surgeon: Mirza Munson MD;  Location: [facility] INVASIVE LOCATION;  Service: Cardiology   • PACEMAKER IMPLANTATION     • MS RT/LT HEART CATHETERS N/A 12/5/2019    Procedure: Percutaneous Coronary Intervention;  Surgeon: Mirza Munson MD;  Location: [facility] INVASIVE LOCATION;  Service: Cardiology   • WOUND DEBRIDEMENT Right 10/29/2020    Procedure: Preparation and debridement of foot wound with application of Integra wound graft, right foot.;  Surgeon: Josef Egan DPM;  Location: [facility] MAIN OR;  Service: Podiatry;  Laterality: Right;   • WOUND DEBRIDEMENT N/A 7/29/2021    Procedure: EXCISIONAL ABDOMINAL WOUND  DEBRIDEMENT;  Surgeon: Cleopatra Wang MD;  Location: [facility] OR;  Service: General;  Laterality: N/A;   • WOUND DEBRIDEMENT N/A 8/1/2021    Procedure: DEBRIDEMENT OF ABDOMINAL WALL;  Surgeon: Hill Bhatia DO;  Location: [facility]  OR;  Service: General;  Laterality: N/A;     Family History   Problem Relation Age of Onset   • Alzheimer's disease Mother    • GI problems Father    • Heart disease Father      Social History     Tobacco Use   • Smoking status: Former Smoker     Years: 15.00     Types: Cigarettes     Start date: 1966     Quit date: 5/10/2020     Years since quittin.0   • Smokeless tobacco: Never Used   Vaping Use   • Vaping Use: Never used   Substance Use Topics   • Alcohol use: Yes     Alcohol/week: 4.0 standard drinks     Types: 4 Shots of liquor per week     Comment: occasionally   • Drug use: No     (Not in a hospital admission)    Allergies:  Haloperidol, Morphine, Pantoprazole, and Latex    Objective     Vital Signs  Temp:  [97.3 °F (36.3 °C)-98.7 °F (37.1 °C)] 97.8 °F (36.6 °C)  Heart Rate:  [] 71  Resp:  [16-20] 16  BP: ()/() 89/73     Physical Exam:      General Appearance:    Alert, cooperative, in no acute distress, elevated BMI    Head:    Normocephalic, without obvious abnormality, atraumatic   Eyes:            Lids and lashes normal, conjunctivae and sclerae normal, no   icterus, no pallor, corneas clear, PERRLA   Ears:    Ears appear intact with no abnormalities noted   Throat:   No oral lesions, no thrush, oral mucosa moist   Neck:   No adenopathy, supple, trachea midline, no thyromegaly, no   carotid bruit, no JVD   Lungs:     Rhonchi and wheezing throughout respirations regular, even and   unlabored    Heart:    Regular rhythm and normal rate, normal S1 and S2, no            murmur, no gallop, no rub, no click   Chest Wall:    No abnormalities observed   Abdomen:    abd hernias noted- non tender to palpate bs are apparent   Extremities:   Moves all extremities well 2-3 plus edema jody ( pt reports this is baseline.) , no cyanosis, no             redness   Pulses:   Pulses palpable and equal bilaterally   Skin:   No bleeding, bruising or rash   Lymph nodes:   No palpable adenopathy    Neurologic:   Cranial nerves 2 - 12 grossly intact, sensation intact, DTR       present and equal bilaterally       Results Review:     Imaging Results (Last 24 Hours)     Procedure Component Value Units Date/Time    XR Chest 1 View [[id number]] Collected: 06/15/22 0317     Updated: 06/15/22 0319    Narrative:      EXAMINATION: Chest one view.    DATE: 6/15/2022.    COMPARISON: 6/2/2022.    CLINICAL HISTORY: Shortness of breath.    FINDINGS:    The lungs and pleural spaces are clear.    The cardiomediastinal silhouette is mildly enlarged and the pulmonary vessels are within normal limits. Left-sided pacemaker has leads in the right atrium and right ventricle.      Impression:        Mild cardiomegaly with no acute findings otherwise seen.    Electronically signed by:  Dat Casey D.O.    6/15/2022 1:18 AM    CT Angiogram Chest Pulmonary Embolism [[id number]] Collected: 06/15/22 0314     Updated: 06/15/22 0318    Narrative:      Exam: CT Angiography of the Chest.    Date: 6/15/2022.     Comparison: 4/22/2022.    History: Shortness of breath and elevated d-dimer.    Technique: CT examination of the chest was performed following the intravenous administration of 100 mL of Isovue-370. Sagittal, coronal and 3-D reformatted images were provided. CT dose lowering techniques were used, to include: automated exposure   control, adjustment for patient size, and/or use of iterative reconstruction.    FINDINGS:    Mediastinum and Vielka: There is no axillary, mediastinal or hilar lymphadenopathy.    Pleural and Pericardial spaces: Trace right pleural effusion.    Upper Abdomen: The visualized upper abdomen is unremarkable.    Cardiovascular: There is moderate vascular calcification throughout the thoracic aorta without evidence of aneurysmal dilation or dissection. Moderate patchy coronary artery calcifications are seen.    Pulmonary Artery: There are no filling defects in the pulmonary arteries.    Lung Parenchyma and  Airways: Patchy areas of opacity are seen within the right middle lobe and anterior aspect of the right lower lobe as well as some posteriorly which is likely inflammatory or infectious. Some bronchial wall thickening is also noted   region. The left lung is clear.    Bones: No fracture or aggressive osseous lesion.      Impression:        1. No evidence of pulmonary embolism.  2. No evidence of thoracic aortic aneurysm or dissection.  3. Patchy areas of opacity within the right middle and right lower lobe as above is likely inflammatory or due to an atypical infectious process.  4. Trace right pleural effusion.      Electronically signed by:  Dat Casey D.O.    6/15/2022 1:17 AM           Lab Results (last 24 hours)     Procedure Component Value Units Date/Time    POC Glucose Once [[id number]]  (Abnormal) Collected: 06/15/22 0915    Specimen: Blood Updated: 06/15/22 0917     Glucose 303 mg/dL      Comment: Serial Number: [id number]:  [id number]       Blood Culture - Blood, Wrist, Right [[id number]] Collected: 06/15/22 0401    Specimen: Blood from Wrist, Right Updated: 06/15/22 0404    POC Glucose Once [[id number]]  (Abnormal) Collected: 06/15/22 0344    Specimen: Blood Updated: 06/15/22 0349     Glucose 215 mg/dL      Comment: Serial Number: [id number]:  [id number]       Blood Gas, Arterial - [[id number]]  (Abnormal) Collected: 06/15/22 0344    Specimen: Arterial Blood Updated: 06/15/22 0349     Site Right Brachial     Moise's Test N/A     pH, Arterial 7.379 pH units      pCO2, Arterial 48.6 mm Hg      pO2, Arterial 86.5 mm Hg      HCO3, Arterial 28.7 mmol/L      Base Excess, Arterial 2.7 mmol/L      Comment: Serial Number: [id number]:  [id number]        O2 Saturation, Arterial 96.2 %      CO2 Content 30.2 mmol/L      Barometric Pressure for Blood Gas --     Comment: N/A        Modality Cannula     FIO2 28 %      Hemodilution No    Respiratory Panel PCR w/COVID-19(SARS-CoV-2) MICHELET/JONATHAN/SMOOTH/PAD/COR/MAD/RANCHO  In-House, NP Swab in [facility], 3-4 HR TAT - Swab, Nasopharynx [[id number]]  (Normal) Collected: 06/15/22 0207    Specimen: Swab from Nasopharynx Updated: 06/15/22 0301     ADENOVIRUS, PCR Not Detected     Coronavirus 229E Not Detected     Coronavirus HKU1 Not Detected     Coronavirus NL63 Not Detected     Coronavirus OC43 Not Detected     COVID19 Not Detected     Human Metapneumovirus Not Detected     Human Rhinovirus/Enterovirus Not Detected     Influenza A PCR Not Detected     Influenza B PCR Not Detected     Parainfluenza Virus 1 Not Detected     Parainfluenza Virus 2 Not Detected     Parainfluenza Virus 3 Not Detected     Parainfluenza Virus 4 Not Detected     RSV, PCR Not Detected     Bordetella pertussis pcr Not Detected     Bordetella parapertussis PCR Not Detected     Chlamydophila pneumoniae PCR Not Detected     Mycoplasma pneumo by PCR Not Detected    Narrative:      In the setting of a positive respiratory panel with a viral infection PLUS a negative procalcitonin without other underlying concern for bacterial infection, consider observing off antibiotics or discontinuation of antibiotics and continue supportive care. If the respiratory panel is positive for atypical bacterial infection (Bordetella pertussis, Chlamydophila pneumoniae, or Mycoplasma pneumoniae), consider antibiotic de-escalation to target atypical bacterial infection.    [place] Draw [[id number]] Collected: 06/15/22 0144    Specimen: Blood Updated: 06/15/22 0247    Narrative:      The following orders were created for panel order [place] Draw.  Procedure                               Abnormality         Status                     ---------                               -----------         ------                     Green Top (Gel)[[id number]]                                  Final result               Lavender Top[[id number]]                                     Final result               Gold Top - SST[[id number]]                                  "  Final result               Light Blue Top[[id number]]                                   Final result                 Please view results for these tests on the individual orders.    Light Blue Top [[id number]] Collected: 06/15/22 0144    Specimen: Blood Updated: 06/15/22 0247     Extra Tube Hold for add-ons.     Comment: Auto resulted       Green Top (Gel) [[id number]] Collected: 06/15/22 0144    Specimen: Blood Updated: 06/15/22 0238    Urinalysis With Culture If Indicated - Urine, Clean Catch [[id number]]  (Abnormal) Collected: 06/15/22 0229    Specimen: Urine, Clean Catch Updated: 06/15/22 0238     Color, UA Yellow     Appearance, UA Clear     pH, UA 7.0     Specific Gravity, UA 1.021     Glucose, UA >=1000 mg/dL (3+)     Ketones, UA Negative     Bilirubin, UA Negative     Blood, UA Negative     Protein, UA Trace     Leuk Esterase, UA Negative     Nitrite, UA Negative     Urobilinogen, UA 1.0 E.U./dL    Narrative:      In absence of clinical symptoms, the presence of pyuria, bacteria, and/or nitrites on the urinalysis result does not correlate with infection.  Urine microscopic not indicated.    Procalcitonin [[id number]]  (Normal) Collected: 06/15/22 0144    Specimen: Blood Updated: 06/15/22 0218     Procalcitonin 0.08 ng/mL     Narrative:      As a Marker for Sepsis (Non-Neonates):    1. <0.5 ng/mL represents a low risk of severe sepsis and/or septic shock.  2. >2 ng/mL represents a high risk of severe sepsis and/or septic shock.    As a Marker for Lower Respiratory Tract Infections that require antibiotic therapy:    PCT on Admission    Antibiotic Therapy       6-12 Hrs later    >0.5                Strongly Recommended  >0.25 - <0.5        Recommended   0.1 - 0.25          Discouraged              Remeasure/reassess PCT  <0.1                Strongly Discouraged     Remeasure/reassess PCT    As 28 day mortality risk marker: \"Change in Procalcitonin Result\" (>80% or <=80%) if Day 0 (or Day 1) and Day 4 values are " available. Refer to http://www.[facility]-pct-calculator.com    Change in PCT <=80%  A decrease of PCT levels below or equal to 80% defines a positive change in PCT test result representing a higher risk for 28-day all-cause mortality of patients diagnosed with severe sepsis for septic shock.    Change in PCT >80%  A decrease of PCT levels of more than 80% defines a negative change in PCT result representing a lower risk for 28-day all-cause mortality of patients diagnosed with severe sepsis or septic shock.       Blood Culture - Blood, Arm, Right [[id number]] Collected: 06/15/22 0207    Specimen: Blood from Arm, Right Updated: 06/15/22 0212    Comprehensive Metabolic Panel [[id number]]  (Abnormal) Collected: 06/15/22 0144    Specimen: Blood Updated: 06/15/22 0212     Glucose 234 mg/dL      BUN 25 mg/dL      Creatinine 1.27 mg/dL      Sodium 135 mmol/L      Potassium 4.8 mmol/L      Chloride 96 mmol/L      CO2 27.0 mmol/L      Calcium 8.8 mg/dL      Total Protein 6.6 g/dL      Albumin 3.40 g/dL      ALT (SGPT) 7 U/L      AST (SGOT) 10 U/L      Alkaline Phosphatase 119 U/L      Total Bilirubin 0.4 mg/dL      Globulin 3.2 gm/dL      A/G Ratio 1.1 g/dL      BUN/Creatinine Ratio 19.7     Anion Gap 12.0 mmol/L      eGFR 58.9 mL/min/1.73      Comment: National Kidney Foundation and American Society of Nephrology (ASN) Task Force recommended calculation based on the Chronic Kidney Disease Epidemiology Collaboration (CKD-EPI) equation refit without adjustment for race.       Narrative:      GFR Normal >60  Chronic Kidney Disease <60  Kidney Failure <15      Troponin [[id number]]  (Normal) Collected: 06/15/22 0144    Specimen: Blood Updated: 06/15/22 0212     Troponin T <0.010 ng/mL     Narrative:      Troponin T Reference Range:  <= 0.03 ng/mL-   Negative for AMI  >0.03 ng/mL-     Abnormal for myocardial necrosis.  Clinicians would have to utilize clinical acumen, EKG, Troponin and serial changes to determine if it is an Acute  Myocardial Infarction or myocardial injury due to an underlying chronic condition.       Results may be falsely decreased if patient taking Biotin.      BNP [[id number]]  (Normal) Collected: 06/15/22 0144    Specimen: Blood Updated: 06/15/22 0209     proBNP 515.8 pg/mL     Narrative:      Among patients with dyspnea, NT-proBNP is highly sensitive for the detection of acute congestive heart failure. In addition NT-proBNP of <300 pg/ml effectively rules out acute congestive heart failure with 99% negative predictive value.    Results may be falsely decreased if patient taking Biotin.      D-dimer, Quantitative [[id number]]  (Abnormal) Collected: 06/15/22 0144    Specimen: Blood Updated: 06/15/22 0159     D-Dimer, Quantitative 1.04 mg/L (FEU)     Narrative:      Reference Range  --------------------------------------------------------------------     < 0.50   Negative Predictive Value  0.50-0.59   Indeterminate    >= 0.60   Probable VTE             A very low percentage of patients with DVT may yield D-Dimer results   below the cut-off of 0.50 mg/L FEU.  This is known to be more   prevalent in patients with distal DVT.             Results of this test should always be interpreted in conjunction with   the patient's medical history, clinical presentation and other   findings.  Clinical diagnosis should not be based on the result of   IN[place]OV[place] D-Dimer alone.    Gold Top - SST [[id number]] Collected: 06/15/22 0144    Specimen: Blood Updated: 06/15/22 0157    Lavender Top [[id number]] Collected: 06/15/22 0144    Specimen: Blood Updated: 06/15/22 0151     Extra Tube --    CBC & Differential [[id number]]  (Abnormal) Collected: 06/15/22 0144    Specimen: Blood Updated: 06/15/22 0149    Narrative:      The following orders were created for panel order CBC & Differential.  Procedure                               Abnormality         Status                     ---------                               -----------         ------                      CBC Auto Differential[[id number]]        Abnormal            Final result                 Please view results for these tests on the individual orders.    CBC Auto Differential [[id number]]  (Abnormal) Collected: 06/15/22 0144    Specimen: Blood Updated: 06/15/22 0149     WBC 10.10 10*3/mm3      RBC 4.58 10*6/mm3      Hemoglobin 12.4 g/dL      Hematocrit 37.9 %      MCV 82.7 fL      MCH 27.2 pg      MCHC 32.8 g/dL      RDW 17.6 %      RDW-SD 51.2 fl      MPV 7.9 fL      Platelets 318 10*3/mm3      Neutrophil % 76.0 %      Lymphocyte % 13.2 %      Monocyte % 6.4 %      Eosinophil % 3.3 %      Basophil % 1.1 %      Neutrophils, Absolute 7.70 10*3/mm3      Lymphocytes, Absolute 1.30 10*3/mm3      Monocytes, Absolute 0.60 10*3/mm3      Eosinophils, Absolute 0.30 10*3/mm3      Basophils, Absolute 0.10 10*3/mm3      nRBC 0.0 /100 WBC            I reviewed the patient's new clinical results.    Assessment & Plan       Pneumonia of right lung due to infectious organism, unspecified part of lung    Pneumonia- adding strep pneuo and legionella, Routine nebs and mucinex. Consulting pulmonology for assistance with management in this patient with multiple admissions for pneumonia. Will continue with vanc and cefepime for now.     COPD with exacerbation - nebs, steroids, mucinex, abx.     Elevated D dimer-  Mild- 1.04, CT chest with no PE patchy opacities in the RML and RLL with trace R effusion.    Afib - AC on eliquis    HFpEF- continue lasix 40 mg qd, metolazone 5mg on m/w/f and monitor lytes- last echo EMBER  in pct 2021 - EF 60 %    CAD- continue asa 81, ranexa, indure  T2dm with HTN, HLD and neuropathy -  Pt is on amaryl at home- will hold this and use SSI here. Continue statin , lyrica.    Chronic LBP - Dilaudid IV prn here. Pt reports this is hat controls pain when he is inpt .      Pt is Ac with eliquis, pepcid bid for GI protection, cardiac consistent carb diet.  Pt order in to evaluate functional mobility     I  discussed the patients findings and my recommendations with patient.     Olga Pierce, KOURTNEY  06/15/22  10:58 EDT

## 2022-06-15 NOTE — DISCHARGE PLACEMENT REQUEST
"Ro Sidhu (76 y.o. Male)             Date of Birth   1945    Social Security Number       Address   [address] [place] [address] [place] [facility]    Home Phone   [phone]    MRN   [id number]       Religious   Taoism    Marital Status                               Admission Date   6/15/22    Admission Type   Emergency    Admitting Provider   Yusuf Jones MD    Attending Provider   Hill Whitfield MD    Department, Room/Bed   [facility] EMERGENCY DEPARTMENT, EDH1/1       Discharge Date       Discharge Disposition       Discharge Destination                               Attending Provider: Hill Whitfield MD    Allergies: Haloperidol, Morphine, Pantoprazole, Latex    Isolation: None   Infection: MRSA No Isolation this Admit (04/19/22)   Code Status: CPR   Advance Care Planning Activity    Ht: 182.9 cm (72\")   Wt: 117 kg (259 lb)    Admission Cmt: None   Principal Problem: Pneumonia of right lung due to infectious organism, unspecified part of lung [J18.9]                 Active Insurance as of 6/15/2022     Primary Coverage     Payor Plan Insurance Group Employer/Plan Group    HUMANA MEDICARE REPLACEMENT HU[employer]NA MEDICARE REPLACEMENT [id number]     Payor Plan Address Payor Plan Phone Number Payor Plan Fax Number Effective Dates    [address] [address] [phone]  1/1/2018 - None Entered    [facility] [address]       Subscriber Name Subscriber Birth Date Member ID       RO SIDHU 1945 [id number]                 Emergency Contacts      (Rel.) Home Phone Work Phone Mobile Phone    LONI SIDHU (Spouse) [phone] -- [phone]            Jagruti Pool RN, [facility]  Office: [phone]  Fax: [phone]  [email or web address]      I met with patient in room wearing PPE: mask and goggles.     Maintained distance greater than six feet and spent </=15 minutes in the room    "

## 2022-06-15 NOTE — CONSULTS
PULMONARY CRITICAL CARE CONSULT NOTE      PATIENT IDENTIFICATION:  Name: Ro Nathan  MRN: [id number]  :  1945     Age: 76 y.o.  Sex: male        DATE OF CONSULTATION:  6/15/2022  PRIMARY CARE PHYSICIAN    Yusuf Jones MD                  CHIEF COMPLAINT: SOB    History of Present Illness:   Ro Nathan is a 76 y.o. male with a history of COPD, CHF, ARTI, HTN, Alcoholic cirrhosis with ascites, CKD 3, CAD s/p stent, A-fib s/p pacemaker, DM II, and Hx prostate cancer ho came to the ER with complaints of a 3 day history of increased SOB. Patient was recently discharged from [place] after being treated for AE COPD. Returns with increased SOB and RLE with redness and edema and appears cellulitic. In the ER patient noted with pneumonia and admitted for further treatment. On 3 L O2 in the ER per mask.       Review of Systems:   Constitutional: As above    Eyes: negative   ENT/oropharynx: negative   Cardiovascular: As above    Respiratory: As above    Gastrointestinal: negative   Genitourinary: negative   Neurological: negative   Musculoskeletal: negative   Integument/breast: negative   Endocrine: negative   Allergic/Immunologic: negative     Past Medical History:  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) 10/26/2020   • Benign hypertension with CKD (chronic kidney disease) stage III (HCC) 10/26/2020   • Bruises easily    • CHF (congestive heart failure) (HCC)    • Chronic bronchitis with COPD (chronic obstructive pulmonary disease) (HCC) 10/26/2020   • Chronic respiratory failure with hypoxia ([facility]) 10/26/2020   • Congenital heart defect    • Difficulty attaining erection    • Heart block    • Hernia of abdominal wall    • Hypertension    • Low back pain    • Pacemaker    • Peripheral vascular disease due to secondary diabetes (HCC) 10/26/2020   • Poor circulation    • Prostate cancer (HCC)     prostate   • Shortness of breath    • Sleep apnea     using  CPAP    • Stage 3a chronic kidney disease (formerly [facility]) 10/26/2020   • Stented coronary artery 12/05/2019    MICHAEL to LAD   • Type 2 diabetes, controlled, with neuropathy (formerly [facility]) 10/26/2020    no meds   • Type 2 diabetes, controlled, with neuropathy (formerly [facility]) 10/26/2020       Past Surgical History:  Past Surgical History:   Procedure Laterality Date   • ABDOMINAL SURGERY     • APPENDECTOMY     • BONE CURETTAGE Right 1/22/2021    Procedure: Wound excision with fifth metatarsal head resection, right foot.;  Surgeon: Josef Egan DPM;  Location: [facility] MAIN OR;  Service: Podiatry;  Laterality: Right;   • BONE INCISION AND DRAINAGE Right 10/5/2020    Procedure: Incision and drainage with debridement of all nonviable soft tissue and bone with bone biopsy, right foot.;  Surgeon: Josef Egan DPM;  Location: [facility] MAIN OR;  Service: Podiatry;  Laterality: Right;   • BRONCHOSCOPY N/A 4/23/2022    Procedure: BRONCHOSCOPY with bronchioalveolar lavage of right lower lobe;  Surgeon: Edwin Kline MD;  Location: [facility] ENDOSCOPY;  Service: Pulmonary;  Laterality: N/A;  pneumonia   • CARDIAC CATHETERIZATION N/A 12/5/2019    Procedure: Left Heart Cath;  Surgeon: Mirza Munson MD;  Location: [facility] CATH INVASIVE LOCATION;  Service: Cardiology   • CARDIAC CATHETERIZATION N/A 12/5/2019    Procedure: Stent MICHAEL coronary;  Surgeon: Mirza Munson MD;  Location: [facility] CATH INVASIVE LOCATION;  Service: Cardiology   • CARDIAC CATHETERIZATION N/A 10/18/2021    Procedure: Left Heart Cath and coronary angiogram;  Surgeon: Celine Swanson MD;  Location: [facility] CATH INVASIVE LOCATION;  Service: Cardiovascular;  Laterality: N/A;   • CARDIAC CATHETERIZATION N/A 10/18/2021    Procedure: Right Heart Cath;  Surgeon: Celine Swanson MD;  Location: [facility] CATH INVASIVE LOCATION;  Service: Cardiovascular;  Laterality: N/A;   • CHOLECYSTECTOMY     • ELBOW PROCEDURE      left   • FOOT SURGERY      right foot   • HERNIA REPAIR      • INCISION AND DRAINAGE OF WOUND Right 2021    Procedure: INCISION AND DRAINAGE OF RIGHT FOOT 5TH METATARSAL TO BONE AND PARTIAL 5TH METATARSAL RESECTION;  Surgeon: Josef Egan DPM;  Location: [facility] MAIN OR;  Service: Podiatry;  Laterality: Right;   • INCISION AND DRAINAGE OF WOUND Right 2021    Procedure: INCISION AND DRAINAGE BONE, DELAYED PRIMARY CLOSURE;  Surgeon: Josef Egan DPM;  Location: [facility] MAIN OR;  Service: Podiatry;  Laterality: Right;   • INTERVENTIONAL RADIOLOGY PROCEDURE N/A 2019    Procedure: Intravascular Ultrasound;  Surgeon: Mirza Munson MD;  Location: [facility] CATH INVASIVE LOCATION;  Service: Cardiology   • PACEMAKER IMPLANTATION     • AL RT/LT HEART CATHETERS N/A 2019    Procedure: Percutaneous Coronary Intervention;  Surgeon: Mirza Munson MD;  Location: [facility] CATH INVASIVE LOCATION;  Service: Cardiology   • WOUND DEBRIDEMENT Right 10/29/2020    Procedure: Preparation and debridement of foot wound with application of Integra wound graft, right foot.;  Surgeon: Josef Egan DPM;  Location: [facility] MAIN OR;  Service: Podiatry;  Laterality: Right;   • WOUND DEBRIDEMENT N/A 2021    Procedure: EXCISIONAL ABDOMINAL WOUND  DEBRIDEMENT;  Surgeon: Cleopatra Wang MD;  Location: [facility] MAIN OR;  Service: General;  Laterality: N/A;   • WOUND DEBRIDEMENT N/A 2021    Procedure: DEBRIDEMENT OF ABDOMINAL WALL;  Surgeon: Hill Bhatia DO;  Location: [facility] MAIN OR;  Service: General;  Laterality: N/A;        Family History:  Family History   Problem Relation Age of Onset   • Alzheimer's disease Mother    • GI problems Father    • Heart disease Father         Social History:   Social History     Tobacco Use   • Smoking status: Former Smoker     Years: 15.00     Types: Cigarettes     Start date: 1966     Quit date: 5/10/2020     Years since quittin.0   • Smokeless tobacco: Never Used   Substance Use Topics   • Alcohol use: Yes  "    Alcohol/week: 4.0 standard drinks     Types: 4 Shots of liquor per week     Comment: occasionally        Allergies:  Allergies   Allergen Reactions   • Haloperidol Hives   • Morphine Itching   • Pantoprazole Other (See Comments)     Feels sick after receiving   • Latex Unknown - High Severity       Home Meds:  (Not in a hospital admission)      Objective:  tMax 24 hrs: Temp (24hrs), Av.9 °F (36.6 °C), Min:97.3 °F (36.3 °C), Max:98.7 °F (37.1 °C)      Vitals Ranges:   Temp:  [97.3 °F (36.3 °C)-98.7 °F (37.1 °C)] 97.7 °F (36.5 °C)  Heart Rate:  [] 73  Resp:  [12-20] 12  BP: ()/() 153/74    Intake and Output Last 3 Shifts:   No intake/output data recorded.    Exam:  /74 (BP Location: Right arm, Patient Position: Sitting)   Pulse 73   Temp 97.7 °F (36.5 °C) (Oral)   Resp 12   Ht 182.9 cm (72\")   Wt 117 kg (259 lb)   SpO2 97%   BMI 35.13 kg/m²       06/15/22  0105   Weight: 117 kg (259 lb)     General Appearance:  AAO    HEENT:  Normocephalic, without obvious abnormality, atraumatic.  Conjunctivae/corneas clear.  Normal external ear canals, Nares normal, no drainage  Neck:  Supple, symmetrical, trachea midline. No JVD.  Lungs /Chest wall:   Bilateral basal rhonchi, respirations unlabored symmetrical wall movement.     Heart:  Regular rate and rhythm, systolic murmur PMI left sternal border  Abdomen: Soft, nontender, no masses, no organomegaly.    Extremities: + edema BLE, no clubbing or cyanosis, RLE red and swollen and appears cellulitic        Data Review:  All labs (24hrs):   Recent Results (from the past 24 hour(s))   ECG 12 Lead    Collection Time: 06/15/22  1:11 AM   Result Value Ref Range    QT Interval 482 ms   Lavender Top    Collection Time: 06/15/22  1:44 AM   Result Value Ref Range    Extra Tube     Light Blue Top    Collection Time: 06/15/22  1:44 AM   Result Value Ref Range    Extra Tube Hold for add-ons.    Comprehensive Metabolic Panel    Collection Time: 06/15/22  " 1:44 AM    Specimen: Blood   Result Value Ref Range    Glucose 234 (H) 65 - 99 mg/dL    BUN 25 (H) 8 - 23 mg/dL    Creatinine 1.27 0.76 - 1.27 mg/dL    Sodium 135 (L) 136 - 145 mmol/L    Potassium 4.8 3.5 - 5.2 mmol/L    Chloride 96 (L) 98 - 107 mmol/L    CO2 27.0 22.0 - 29.0 mmol/L    Calcium 8.8 8.6 - 10.5 mg/dL    Total Protein 6.6 6.0 - 8.5 g/dL    Albumin 3.40 (L) 3.50 - 5.20 g/dL    ALT (SGPT) 7 1 - 41 U/L    AST (SGOT) 10 1 - 40 U/L    Alkaline Phosphatase 119 (H) 39 - 117 U/L    Total Bilirubin 0.4 0.0 - 1.2 mg/dL    Globulin 3.2 gm/dL    A/G Ratio 1.1 g/dL    BUN/Creatinine Ratio 19.7 7.0 - 25.0    Anion Gap 12.0 5.0 - 15.0 mmol/L    eGFR 58.9 (L) >60.0 mL/min/1.73   Troponin    Collection Time: 06/15/22  1:44 AM    Specimen: Blood   Result Value Ref Range    Troponin T <0.010 0.000 - 0.030 ng/mL   D-dimer, Quantitative    Collection Time: 06/15/22  1:44 AM    Specimen: Blood   Result Value Ref Range    D-Dimer, Quantitative 1.04 (H) 0.00 - 0.59 mg/L (FEU)   BNP    Collection Time: 06/15/22  1:44 AM    Specimen: Blood   Result Value Ref Range    proBNP 515.8 0.0 - 1,800.0 pg/mL   CBC Auto Differential    Collection Time: 06/15/22  1:44 AM    Specimen: Blood   Result Value Ref Range    WBC 10.10 3.40 - 10.80 10*3/mm3    RBC 4.58 4.14 - 5.80 10*6/mm3    Hemoglobin 12.4 (L) 13.0 - 17.7 g/dL    Hematocrit 37.9 37.5 - 51.0 %    MCV 82.7 79.0 - 97.0 fL    MCH 27.2 26.6 - 33.0 pg    MCHC 32.8 31.5 - 35.7 g/dL    RDW 17.6 (H) 12.3 - 15.4 %    RDW-SD 51.2 37.0 - 54.0 fl    MPV 7.9 6.0 - 12.0 fL    Platelets 318 140 - 450 10*3/mm3    Neutrophil % 76.0 42.7 - 76.0 %    Lymphocyte % 13.2 (L) 19.6 - 45.3 %    Monocyte % 6.4 5.0 - 12.0 %    Eosinophil % 3.3 0.3 - 6.2 %    Basophil % 1.1 0.0 - 1.5 %    Neutrophils, Absolute 7.70 (H) 1.70 - 7.00 10*3/mm3    Lymphocytes, Absolute 1.30 0.70 - 3.10 10*3/mm3    Monocytes, Absolute 0.60 0.10 - 0.90 10*3/mm3    Eosinophils, Absolute 0.30 0.00 - 0.40 10*3/mm3    Basophils,  Absolute 0.10 0.00 - 0.20 10*3/mm3    nRBC 0.0 0.0 - 0.2 /100 WBC   Procalcitonin    Collection Time: 06/15/22  1:44 AM    Specimen: Blood   Result Value Ref Range    Procalcitonin 0.08 0.00 - 0.25 ng/mL   Respiratory Panel PCR w/COVID-19(SARS-CoV-2) MICHELET/JONATHAN/SMOOTH/PAD/COR/MAD/RANCHO In-House, NP Swab in UTM/VTM, 3-4 HR TAT - Swab, Nasopharynx    Collection Time: 06/15/22  2:07 AM    Specimen: Nasopharynx; Swab   Result Value Ref Range    ADENOVIRUS, PCR Not Detected Not Detected    Coronavirus 229E Not Detected Not Detected    Coronavirus HKU1 Not Detected Not Detected    Coronavirus NL63 Not Detected Not Detected    Coronavirus OC43 Not Detected Not Detected    COVID19 Not Detected Not Detected - Ref. Range    Human Metapneumovirus Not Detected Not Detected    Human Rhinovirus/Enterovirus Not Detected Not Detected    Influenza A PCR Not Detected Not Detected    Influenza B PCR Not Detected Not Detected    Parainfluenza Virus 1 Not Detected Not Detected    Parainfluenza Virus 2 Not Detected Not Detected    Parainfluenza Virus 3 Not Detected Not Detected    Parainfluenza Virus 4 Not Detected Not Detected    RSV, PCR Not Detected Not Detected    Bordetella pertussis pcr Not Detected Not Detected    Bordetella parapertussis PCR Not Detected Not Detected    Chlamydophila pneumoniae PCR Not Detected Not Detected    Mycoplasma pneumo by PCR Not Detected Not Detected   Urinalysis With Culture If Indicated - Urine, Clean Catch    Collection Time: 06/15/22  2:29 AM    Specimen: Urine, Clean Catch   Result Value Ref Range    Color, UA Yellow Yellow, Straw    Appearance, UA Clear Clear    pH, UA 7.0 5.0 - 8.0    Specific Gravity, UA 1.021 1.005 - 1.030    Glucose, UA >=1000 mg/dL (3+) (A) Negative    Ketones, UA Negative Negative    Bilirubin, UA Negative Negative    Blood, UA Negative Negative    Protein, UA Trace (A) Negative    Leuk Esterase, UA Negative Negative    Nitrite, UA Negative Negative    Urobilinogen, UA 1.0 E.U./dL  0.2 - 1.0 E.U./dL   Blood Gas, Arterial -    Collection Time: 06/15/22  3:44 AM    Specimen: Arterial Blood   Result Value Ref Range    Site Right Brachial     Moise's Test N/A     pH, Arterial 7.379 7.350 - 7.450 pH units    pCO2, Arterial 48.6 (H) 35.0 - 48.0 mm Hg    pO2, Arterial 86.5 83.0 - 108.0 mm Hg    HCO3, Arterial 28.7 (H) 21.0 - 28.0 mmol/L    Base Excess, Arterial 2.7 0.0 - 3.0 mmol/L    O2 Saturation, Arterial 96.2 94.0 - 98.0 %    CO2 Content 30.2 (H) 22 - 29 mmol/L    Barometric Pressure for Blood Gas      Modality Cannula     FIO2 28 %    Hemodilution No    POC Glucose Once    Collection Time: 06/15/22  3:44 AM    Specimen: Blood   Result Value Ref Range    Glucose 215 (H) 74 - 100 mg/dL   POC Glucose Once    Collection Time: 06/15/22  9:15 AM    Specimen: Blood   Result Value Ref Range    Glucose 303 (H) 70 - 105 mg/dL   POC Glucose Once    Collection Time: 06/15/22 11:23 AM    Specimen: Blood   Result Value Ref Range    Glucose 443 (C) 70 - 105 mg/dL        Imaging:  XR Chest 1 View    Result Date: 6/15/2022  EXAMINATION: Chest one view. DATE: 6/15/2022. COMPARISON: 6/2/2022. CLINICAL HISTORY: Shortness of breath. FINDINGS: The lungs and pleural spaces are clear. The cardiomediastinal silhouette is mildly enlarged and the pulmonary vessels are within normal limits. Left-sided pacemaker has leads in the right atrium and right ventricle.     Mild cardiomegaly with no acute findings otherwise seen. Electronically signed by:  Dat Casey D.O.  6/15/2022 1:18 AM    XR Chest 1 View    Result Date: 6/2/2022  EXAMINATION: XR CHEST 1 VW-  DATE OF EXAM: 6/2/2022 10:02 AM  INDICATION: Dyspnea.  COMPARISON: Chest radiograph dated 4/22/2022  TECHNIQUE: Portable AP view of the chest was obtained.  FINDINGS: There is left chest wall pacemaker with leads in unchanged position. There is cardiomegaly post surgical changes of CABG. There are low lung volumes with right basilar airspace opacity which appears  persistent but mildly improved from the prior radiograph. There are probable small bilateral pleural effusions. There is no evidence of pneumothorax. There are multilevel degenerative changes of the thoracic spine.      1. Persistent but mildly improved right basilar airspace opacity compared to the prior examination. Findings are concerning for residual pneumonia. 2. Small bilateral pleural effusions.  Electronically Signed By-Jacques To MD On:6/2/2022 10:40 AM This report was finalized on [id number] by  Jacques To MD.    CT Angiogram Chest Pulmonary Embolism    Result Date: 6/15/2022  Exam: CT Angiography of the Chest. Date: 6/15/2022. Comparison: 4/22/2022. History: Shortness of breath and elevated d-dimer. Technique: CT examination of the chest was performed following the intravenous administration of 100 mL of Isovue-370. Sagittal, coronal and 3-D reformatted images were provided. CT dose lowering techniques were used, to include: automated exposure control, adjustment for patient size, and/or use of iterative reconstruction. FINDINGS: Mediastinum and Vielka: There is no axillary, mediastinal or hilar lymphadenopathy. Pleural and Pericardial spaces: Trace right pleural effusion. Upper Abdomen: The visualized upper abdomen is unremarkable. Cardiovascular: There is moderate vascular calcification throughout the thoracic aorta without evidence of aneurysmal dilation or dissection. Moderate patchy coronary artery calcifications are seen. Pulmonary Artery: There are no filling defects in the pulmonary arteries. Lung Parenchyma and Airways: Patchy areas of opacity are seen within the right middle lobe and anterior aspect of the right lower lobe as well as some posteriorly which is likely inflammatory or infectious. Some bronchial wall thickening is also noted region. The left lung is clear. Bones: No fracture or aggressive osseous lesion.     1. No evidence of pulmonary embolism. 2. No evidence of  thoracic aortic aneurysm or dissection. 3. Patchy areas of opacity within the right middle and right lower lobe as above is likely inflammatory or due to an atypical infectious process. 4. Trace right pleural effusion. Electronically signed by:  Dat Casey D.O.  6/15/2022 1:17 AM       ASSESSMENT:  Pneumonia of right lung  COPD  ARTI  Chronic low back pain  A-fib s/p pacemaker  CAD s/p stent    DM II  CHF     Chronic low back pain   Alcoholic cirrhosis with ascites  HTN  CKD 3  Hx prostate cancer  Cellulitis RLE      PLAN:  Antibiotics  Bronchodilator  Inhaled corticosteroids  Incentive spirometer  IV steroids  Wound care to see  Electrolytes/ glycemic control  DVT and GI prophylaxis-Apixaban     Discussed with Dr. Danica Fernández, APRN   6/15/2022  12:06 EDT      I personally have examined  and interviewed the patient. I have reviewed the history, data, problems, assessment and plan with our NP.  Critical care time in direct medical management (   ) minutes  Electronically signed by Ashley Mishra MD. D, ABSM.

## 2022-06-15 NOTE — NURSING NOTE
WOCN note:    76 yr old male admitted 6/15/22 with pneumonia. WOCN consult received for RLE cellulitis.     Patient presents with abrasions to bilateral knees. He reported his knee gave out at home early this AM on the way to the bathroom.   The right knee has 2 small abrasions with partial thickness skin loss. The left knee has a healing abrasion measuring 3.5x6cm. Both wounds cleansed with NS.   Recommend applying a thin layer of Bacitracin to the areas.     Patient also has a healed RLQ abdominal wound. There is a bordered gauze dressing over the area. There is crusting to an area of scar tissue. The gauze dressing was replaced.   We will continue to follow as needed.

## 2022-06-15 NOTE — CASE MANAGEMENT/SOCIAL WORK
Discharge Planning Assessment  [facility]     Patient Name: Ro Nathan  MRN: [id number]  Today's Date: 6/15/2022    Admit Date: 6/15/2022     Discharge Needs Assessment     Row Name 06/15/22 1154       Living Environment    People in Home spouse;other relative(s)    Name(s) of People in Home Spouse Meghan; sister in law Carlyn    Current Living Arrangements home    Primary Care Provided by self    Provides Primary Care For no one, unable/limited ability to care for self    Family Caregiver if Needed spouse    Family Caregiver Names Meghan    Quality of Family Relationships supportive;helpful;involved    Able to Return to Prior Arrangements yes       Resource/Environmental Concerns    Resource/Environmental Concerns none    Transportation Concerns none       Transition Planning    Patient/Family Anticipates Transition to home with family;home with help/services    Patient/Family Anticipated Services at Transition home health care    Transportation Anticipated family or friend will provide  Meghan       Discharge Needs Assessment    Current Outpatient/Agency/Support Group homecare agency;other (see comments)  Outpatient wound care at [facility]    Equipment Currently Used at Home walker, rolling;nebulizer;shower chair;glucometer    Concerns to be Addressed denies needs/concerns at this time    Anticipated Changes Related to Illness none    Equipment Needed After Discharge other (see comments)  follow for O2 needs    Outpatient/Agency/Support Group Needs homecare agency    Discharge Facility/Level of Care Needs home with home health    Provided Post Acute Provider List? Yes    Post Acute Provider List DME Supplier;Inpatient Rehab;Nursing Home    Provided Post Acute Provider Quality & Resource List? Yes    Post Acute Provider Quality and Resource List Inpatient Rehab;Nursing Home    Delivered To Patient    Method of Delivery In person    Patient's Choice of Community Agency(s) Pt reports he is  current with [facility]  and wishes to continue with them    Current Discharge Risk chronically ill               Discharge Plan     Row Name 06/15/22 1202       Plan    Plan Home with family . Current with [facility]. PT eval pending. May need exercise oximetry prior to dc    Patient/Family in Agreement with Plan yes    Plan Comments  spoke with patient. He confirms PCP. Wishes to be unenrolled in Meds to Bed. Pharmacy updated in [facility]. Maria E with [facility] notified of patient's admission.Pt plans to return home with spouse at [place] and continue formerly [facility] services and outpatient wound therapy at [facility]. He currently denies new dc needs. Reports he was able to get his medications at last discharge without in problem,and is taking them as prescribed.He hasn't followed-up with Dr. Jones since last hospitalization,but reports he has an appointment scheduled.Reports he has been seen by HH since last hospitalization. Denies problems understanding or following dc instructions. Follow for possible new O2 needs              Continued Care and Services - Admitted Since 6/15/2022     Home Medical Care     Service Provider Request Status Selected Services Address Phone Fax Patient Preferred    [facility]  Pending - Request Sent N/A [address] [address] [phone] --                    Demographic Summary     Row Name 06/15/22 1152       General Information    Admission Type observation    Arrived From home    Required Notices Provided Observation Status Notice    Referral Source admission list;high risk screening    Reason for Consult discharge planning    Preferred Language English       Contact Information    Permission Granted to Share Info With ;facility                Functional Status     Row Name 06/15/22 2127       Functional Status    Usual Activity Tolerance moderate    Current Activity Tolerance moderate       Functional  Status, IADL    Medications independent    IADL Comments Pt reports he is independent with dressing/feeding, but spouse assists with bathing                 Patient Forms     Row Name 06/15/22 1205       Patient Forms    Important Message from Medicare ([facility]) --  MOON6/15/22    Patient Observation Letter Delivered  TENA dated 6/15/22 noted in EPIC              Jagruti Pool RN, [facility]  Office: [phone]  Fax: [phone]  [email or web address]      I met with patient in room wearing PPE: mask and goggles.     Maintained distance greater than six feet and spent </=15 minutes in the room      Jagruti Pool RN

## 2022-06-15 NOTE — PROGRESS NOTES
"Pharmacy Antimicrobial Dosing Service    Subjective:  Ro Nathan is a 76 y.o.male admitted with pneumonia of the right lung due to infectious organism.  Pharmacy has been consulted to dose Vancomycin and Cefepime for possible pneumonia.    PMH: COPD, DM2, CKD, recent hospitalziation      Assessment/Plan    1. Day # 1 Vancomycin: Goal -600 mcg*h/mL. Patient received a 1,750 mg (~18.7 adjBW) loading dose around 0700 today. Will continue with 750 mg (~8 mg/kg adjBW) every 12 hours. [facility] predicts an AUC of 443 and T1/2 of 20.7 hours. Will obtain levels prior to the third dose to assess patient specific pharmacokinetics.     2. Day # 1 Cefepime: 2 grams IV q8h for estCrCl > 60 mL/min.    Will continue to monitor drug levels, renal function, culture and sensitivities, and patient clinical status.       Objective:  Relevant clinical data and objective history reviewed:  182.9 cm (72\")   117 kg (259 lb)   Ideal body weight: 77.6 kg (171 lb 1.2 oz)  Adjusted ideal body weight: 93.6 kg (206 lb 3.9 oz)  Body mass index is 35.13 kg/m².        Results from last 7 days   Lab Units 06/15/22  0144   CREATININE mg/dL 1.27     Estimated Creatinine Clearance: 66.4 mL/min (by C-G formula based on SCr of 1.27 mg/dL).  No intake/output data recorded.    Results from last 7 days   Lab Units 06/15/22  0144   WBC 10*3/mm3 10.10     Temperature    06/15/22 0143 06/15/22 0732 06/15/22 1130   Temp: 98.7 °F (37.1 °C) 97.8 °F (36.6 °C) 97.7 °F (36.5 °C)     Baseline culture/source/susceptibility:  Microbiology Results (last 10 days)       Procedure Component Value - Date/Time    Respiratory Panel PCR w/COVID-19(SARS-CoV-2) [facility]/JONATHAN/SMOOTH/[facility]/MAD/RANCHO In-House, NP Swab in UTM/VTM, 3-4 HR TAT - Swab, Nasopharynx [[id number]]  (Normal) Collected: 06/15/22 0207    Lab Status: Final result Specimen: Swab from Nasopharynx Updated: 06/15/22 0309     ADENOVIRUS, PCR Not Detected     Coronavirus 229E Not Detected     Coronavirus HKU1 " Not Detected     Coronavirus NL63 Not Detected     Coronavirus OC43 Not Detected     COVID19 Not Detected     Human Metapneumovirus Not Detected     Human Rhinovirus/Enterovirus Not Detected     Influenza A PCR Not Detected     Influenza B PCR Not Detected     Parainfluenza Virus 1 Not Detected     Parainfluenza Virus 2 Not Detected     Parainfluenza Virus 3 Not Detected     Parainfluenza Virus 4 Not Detected     RSV, PCR Not Detected     Bordetella pertussis pcr Not Detected     Bordetella parapertussis PCR Not Detected     Chlamydophila pneumoniae PCR Not Detected     Mycoplasma pneumo by PCR Not Detected    Narrative:      In the setting of a positive respiratory panel with a viral infection PLUS a negative procalcitonin without other underlying concern for bacterial infection, consider observing off antibiotics or discontinuation of antibiotics and continue supportive care. If the respiratory panel is positive for atypical bacterial infection (Bordetella pertussis, Chlamydophila pneumoniae, or Mycoplasma pneumoniae), consider antibiotic de-escalation to target atypical bacterial infection.            Anti-Infectives (From admission, onward)      Ordered     Dose/Rate Route Frequency Start Stop    06/15/22 1143  vancomycin 750 mg in sodium chloride 0.9 % 250 mL IVPB        Ordering Provider: Olga Pierce APRN    750 mg Intravenous Every 12 Hours 06/15/22 1900 06/25/22 1859    06/15/22 1143  cefepime 2 gm IVPB in 100 ml NS (MBP)        Ordering Provider: Olga Pierce APRN    2 g  over 4 Hours Intravenous Every 8 Hours 06/15/22 1400 06/25/22 1359    06/15/22 1127  Pharmacy to Dose Cefepime        Ordering Provider: Olga Pierce APRN     Does not apply Continuous PRN 06/15/22 1127 06/25/22 1126    06/15/22 1127  Pharmacy to dose vancomycin        Ordering Provider: Olga Pierce APRN     Does not apply Continuous PRN 06/15/22 1126 06/25/22 1125    06/15/22 0406  cefepime 2 gm IVPB in 100 ml NS  (MBP)        Ordering Provider: Hill Whitfield MD    2 g  over 30 Minutes Intravenous Once 06/15/22 0408 06/15/22 0646    06/15/22 0406  vancomycin IVPB 1750 mg in 0.9% Sodium Chloride (premix) 500 mL        Ordering Provider: Hill Whitfield MD    20 mg/kg × 93.4 kg (Adjusted) Intravenous Once 06/15/22 0408 06/15/22 0908            Meghan Cole [facility]  06/15/22 11:45 EDT

## 2022-06-16 NOTE — PLAN OF CARE
Goal Outcome Evaluation:  Patient has had no complaints this shift. Patient on IV antibiotics. Will continue to monitor.

## 2022-06-16 NOTE — PROGRESS NOTES
"Pharmacy Antimicrobial Dosing Service    Subjective:  Ro Nathan is a 76 y.o.male admitted with R sided pneumonia. Pulm has been consulted. Pharmacy has been consulted to dose Vancomycin and Cefepime for possible pneumonia.    PMH: COPD, DM2, CKD, recent hospitalziation      Assessment/Plan    1. Day #2 Vancomycin: Goal -600 mcg*h/mL. Patient received a 1,750 mg (~18 mg/kg DBW) IV x1 yesterday morning and then was continued on 750 mg (~8 mg/kg DBW) IV q12h. Peak and trough levels were obtained following the first maintenance dose; calculated AUC is 408 with trough 14.1 mcg/mL. Additionally, pt specific t1/2 is 19.2 hours. Will adjust frequency to better align with pt t/12. Adjusting to 1750 mg (~18 mg/kg DBW) IV q24h for predicted  with trough 13.8 mcg/mL. Will obtain peak level at 1300 today and random level with am labs tomorrow.    2. Day #2 Cefepime: 2 grams IV q8h for estCrCl > 60 mL/min.    Will continue to monitor drug levels, renal function, culture and sensitivities, and patient clinical status.       Objective:  Relevant clinical data and objective history reviewed:  188 cm (74\")   127 kg (280 lb)   Ideal body weight: 82.2 kg (181 lb 3.5 oz)  Adjusted ideal body weight: 100 kg (220 lb 11.7 oz)  Body mass index is 35.95 kg/m².    Results from last 7 days   Lab Units 06/16/22  0537 06/15/22  2334   VANCOMYCIN PK mcg/mL  --  15.70*   VANCOMYCIN TR mcg/mL 10.90  --      Results from last 7 days   Lab Units 06/15/22  2334 06/15/22  0144   CREATININE mg/dL 1.27 1.27     Estimated Creatinine Clearance: 71.1 mL/min (by C-G formula based on SCr of 1.27 mg/dL).  I/O last 3 completed shifts:  In: 1770 [P.O.:1320; IV Piggyback:450]  Out: -     Results from last 7 days   Lab Units 06/15/22  2334 06/15/22  0144   WBC 10*3/mm3 15.40* 10.10     Temperature    06/15/22 1942 06/16/22 0040 06/16/22 0518   Temp: 97.5 °F (36.4 °C) 97.4 °F (36.3 °C) 96.7 °F (35.9 °C)     Baseline " culture/source/susceptibility:  Microbiology Results (last 10 days)       Procedure Component Value - Date/Time    S. Pneumo Ag Urine or CSF - Urine, Urine, Clean Catch [[id number]]  (Normal) Collected: 06/15/22 2150    Lab Status: Final result Specimen: Urine, Clean Catch Updated: 06/15/22 2257     Strep Pneumo Ag Negative    Legionella Antigen, Urine - Urine, Urine, Clean Catch [[id number]]  (Normal) Collected: 06/15/22 2150    Lab Status: Final result Specimen: Urine, Clean Catch Updated: 06/15/22 2257     LEGIONELLA ANTIGEN, URINE Negative    MRSA Screen, PCR (Inpatient) - Swab, Nares [[id number]]  (Abnormal) Collected: 06/15/22 1532    Lab Status: Final result Specimen: Swab from Nares Updated: 06/15/22 1753     MRSA PCR MRSA Detected    Narrative:      The negative predictive value of this diagnostic test is high and should only be used to consider de-escalating anti-MRSA therapy. A positive result may indicate colonization with MRSA and must be correlated clinically.    Blood Culture - Blood, Wrist, Right [[id number]]  (Normal) Collected: 06/15/22 0401    Lab Status: Preliminary result Specimen: Blood from Wrist, Right Updated: 06/16/22 0417     Blood Culture No growth at 24 hours    Blood Culture - Blood, Arm, Right [[id number]]  (Abnormal) Collected: 06/15/22 0207    Lab Status: Preliminary result Specimen: Blood from Arm, Right Updated: 06/16/22 0521     Blood Culture Abnormal Stain     Gram Stain Anaerobic Bottle Gram positive cocci in clusters    Respiratory Panel PCR w/COVID-19(SARS-CoV-2) [facility]HELET/JONATHAN/SMOOTH/PAD/COR/MAD/RANCHO In-House, NP Swab in UTM/VTM, 3-4 HR TAT - Swab, Nasopharynx [[id number]]  (Normal) Collected: 06/15/22 0207    Lab Status: Final result Specimen: Swab from Nasopharynx Updated: 06/15/22 0301     ADENOVIRUS, PCR Not Detected     Coronavirus 229E Not Detected     Coronavirus HKU1 Not Detected     Coronavirus NL63 Not Detected     Coronavirus OC43 Not Detected     COVID19 Not Detected     Human  Metapneumovirus Not Detected     Human Rhinovirus/Enterovirus Not Detected     Influenza A PCR Not Detected     Influenza B PCR Not Detected     Parainfluenza Virus 1 Not Detected     Parainfluenza Virus 2 Not Detected     Parainfluenza Virus 3 Not Detected     Parainfluenza Virus 4 Not Detected     RSV, PCR Not Detected     Bordetella pertussis pcr Not Detected     Bordetella parapertussis PCR Not Detected     Chlamydophila pneumoniae PCR Not Detected     Mycoplasma pneumo by PCR Not Detected    Narrative:      In the setting of a positive respiratory panel with a viral infection PLUS a negative procalcitonin without other underlying concern for bacterial infection, consider observing off antibiotics or discontinuation of antibiotics and continue supportive care. If the respiratory panel is positive for atypical bacterial infection (Bordetella pertussis, Chlamydophila pneumoniae, or Mycoplasma pneumoniae), consider antibiotic de-escalation to target atypical bacterial infection.    Blood Culture ID, PCR - Blood, Arm, Right [[id number]]  (Abnormal) Collected: 06/15/22 0207    Lab Status: Final result Specimen: Blood from Arm, Right Updated: 06/16/22 0520     BCID, PCR Staph spp, not aureus or lugdunesis. Identification by BCID2 PCR.     BOTTLE TYPE Anaerobic Bottle            Anti-Infectives (From admission, onward)      Ordered     Dose/Rate Route Frequency Start Stop    06/16/22 0730  vancomycin IVPB 1750 mg in 0.9% Sodium Chloride (premix) 500 mL        Ordering Provider: Jeane Sanders MD    1,750 mg  over 180 Minutes Intravenous Every 24 Hours 06/16/22 0830 06/20/22 0814    06/15/22 1143  cefepime 2 gm IVPB in 100 ml NS (MBP)        Ordering Provider: Olga Pierce APRN    2 g  200 mL/hr over 30 Minutes Intravenous Every 8 Hours 06/15/22 1400 06/25/22 1359    06/15/22 1127  Pharmacy to Dose Cefepime        Ordering Provider: Olga Pierce APRN     Does not apply Continuous PRN 06/15/22 1127 06/25/22 1126     06/15/22 1127  Pharmacy to dose vancomycin        Ordering Provider: Olga Pierce APRN     Does not apply Continuous PRN 06/15/22 1126 06/25/22 1125    06/15/22 0406  cefepime 2 gm IVPB in 100 ml NS (MBP)        Ordering Provider: Hill Whitfield MD    2 g  over 30 Minutes Intravenous Once 06/15/22 0408 06/15/22 0646    06/15/22 0406  vancomycin IVPB 1750 mg in 0.9% Sodium Chloride (premix) 500 mL        Ordering Provider: Hill Whitfield MD    20 mg/kg × 93.4 kg (Adjusted) Intravenous Once 06/15/22 0408 06/15/22 0908            Elenita Martínez, PharmD  06/16/22 07:30 EDT

## 2022-06-16 NOTE — PROGRESS NOTES
LOS: 0 days   Patient Care Team:  [facility]us[facility] as PCP [facility]iology[facility]  Moise Watson MD as Consulting Physician (Cardiac Electrophysiology)  Izzy Alvarez MD as Consulting Physician (Nephrology)  Celine Swanson MD as Consulting Physician (Cardiology)    Subjective     Interval History: Improved overnight with less cough and wheezing.  Less short of breath.  Blood sugars are elevated    Patient Complaints: Generally weak, less short of breath    History taken from: patient    Review of Systems   Constitutional: Positive for activity change, appetite change and fatigue. Negative for diaphoresis.   HENT: Negative for facial swelling.    Eyes: Negative for visual disturbance.   Respiratory: Positive for cough, shortness of breath and wheezing. Negative for stridor.    Cardiovascular: Negative for chest pain, palpitations and leg swelling.   Gastrointestinal: Negative for abdominal pain, diarrhea and nausea.   Endocrine: Negative for polyuria.   Genitourinary: Negative for hematuria.   Musculoskeletal: Positive for arthralgias, back pain and gait problem.   Skin: Positive for wound.   Neurological: Negative for tremors, seizures, syncope, speech difficulty, weakness and headaches.   Psychiatric/Behavioral: Negative for confusion.           Objective     Vital Signs  Temp:  [96.7 °F (35.9 °C)-98.2 °F (36.8 °C)] 96.9 °F (36.1 °C)  Heart Rate:  [69-88] 88  Resp:  [18-20] 18  BP: (111-144)/(65-80) 136/66    Physical Exam:     General Appearance:    Alert, cooperative, in no acute distress, currently on room air, elevated BMI   Head:    Normocephalic, without obvious abnormality, atraumatic   Eyes:            Lids and lashes normal, conjunctivae and sclerae normal, no   icterus, no pallor, corneas clear, PERRLA   Ears:    Ears appear intact with no abnormalities noted   Throat:   No oral lesions, no thrush, oral mucosa moist    Neck:   No adenopathy, supple, trachea midline, no thyromegaly, no   carotid bruit, no JVD   Lungs:    Bibasilar rhonchi, no wheezing    Heart:    Regular rhythm and normal rate, normal S1 and S2, no            murmur, no gallop, no rub, no click   Chest Wall:    No abnormalities observed   Abdomen:    Large ventral hernia, unchanged from multiple previous exams   Extremities:   Moves all extremities well, no edema, no cyanosis, no             Redness   Pulses:   Pulses palpable and equal bilaterally   Skin:  Abrasions bilateral knees   Lymph nodes:   No palpable adenopathy   Neurologic:   Cranial nerves 2 - 12 grossly intact, sensation intact, DTR       present and equal bilaterally        Results Review:    Lab Results (last 24 hours)     Procedure Component Value Units Date/Time    POC Glucose Once [[id number]]  (Abnormal) Collected: 06/16/22 1417    Specimen: Blood Updated: 06/16/22 1418     Glucose 387 mg/dL      Comment: Serial Number: [id number]:  [id number]       Vancomycin, Peak Do not cancel as duplicate- we need this level in addition to levels obtained this morning. [[id number]]  (Normal) Collected: 06/16/22 1307    Specimen: Blood Updated: 06/16/22 1356     Vancomycin Peak 37.70 mcg/mL     Narrative:      Therapeutic Ranges for Vancomycin    Vancomycin Random   5.0-40.0 mcg/mL  Vancomycin Trough   5.0-20.0 mcg/mL  Vancomycin Peak     20.0-40.0 mcg/mL    POC Glucose Once [[id number]]  (Abnormal) Collected: 06/16/22 1131    Specimen: Blood Updated: 06/16/22 1133     Glucose 425 mg/dL      Comment: Serial Number: [id number]:  [id number]       POC Glucose Once [[id number]]  (Abnormal) Collected: 06/16/22 0728    Specimen: Blood Updated: 06/16/22 0729     Glucose 275 mg/dL      Comment: Serial Number: [id number]:  [id number]       Vancomycin, Trough [[id number]]  (Normal) Collected: 06/16/22 0537    Specimen: Blood Updated: 06/16/22 0616     Vancomycin Trough 10.90 mcg/mL     Narrative:       Therapeutic Ranges for Vancomycin    Vancomycin Random   5.0-40.0 mcg/mL  Vancomycin Trough   5.0-20.0 mcg/mL  Vancomycin Peak     20.0-40.0 mcg/mL    Blood Culture - Blood, Arm, Right [[id number]]  (Abnormal) Collected: 06/15/22 0207    Specimen: Blood from Arm, Right Updated: 06/16/22 0521     Blood Culture Abnormal Stain     Gram Stain Anaerobic Bottle Gram positive cocci in clusters    Blood Culture ID, PCR - Blood, Arm, Right [[id number]]  (Abnormal) Collected: 06/15/22 0207    Specimen: Blood from Arm, Right Updated: 06/16/22 0520     BCID, PCR Staph spp, not aureus or lugdunesis. Identification by BCID2 PCR.     BOTTLE TYPE Anaerobic Bottle    Blood Culture - Blood, Wrist, Right [[id number]]  (Normal) Collected: 06/15/22 0401    Specimen: Blood from Wrist, Right Updated: 06/16/22 0417     Blood Culture No growth at 24 hours    CBC & Differential [[id number]]  (Abnormal) Collected: 06/15/22 2334    Specimen: Blood Updated: 06/16/22 0016    Narrative:      The following orders were created for panel order CBC & Differential.  Procedure                               Abnormality         Status                     ---------                               -----------         ------                     Manual Differential[[id number]]          Abnormal            Final result               CBC Auto Differential[[id number]]        Abnormal            Final result                 Please view results for these tests on the individual orders.    CBC Auto Differential [[id number]]  (Abnormal) Collected: 06/15/22 2334    Specimen: Blood Updated: 06/16/22 0016     WBC 15.40 10*3/mm3      RBC 4.04 10*6/mm3      Hemoglobin 10.6 g/dL      Hematocrit 33.5 %      MCV 83.1 fL      MCH 26.3 pg      MCHC 31.6 g/dL      RDW 17.5 %      RDW-SD 51.2 fl      MPV 8.1 fL      Platelets 308 10*3/mm3     Manual Differential [[id number]]  (Abnormal) Collected: 06/15/22 2334    Specimen: Blood Updated: 06/16/22 0016     Neutrophil % 89.0 %       Lymphocyte % 2.0 %      Monocyte % 3.0 %      Bands %  6.0 %      Neutrophils Absolute 14.63 10*3/mm3      Lymphocytes Absolute 0.31 10*3/mm3      Monocytes Absolute 0.46 10*3/mm3      Anisocytosis Slight/1+     Elliptocytes Mod/2+     WBC Morphology Normal     Platelet Morphology Normal    Comprehensive Metabolic Panel [[id number]]  (Abnormal) Collected: 06/15/22 2334    Specimen: Blood Updated: 06/16/22 0006     Glucose 361 mg/dL      BUN 26 mg/dL      Creatinine 1.27 mg/dL      Sodium 131 mmol/L      Potassium 4.2 mmol/L      Chloride 95 mmol/L      CO2 24.0 mmol/L      Calcium 8.5 mg/dL      Total Protein 6.2 g/dL      Albumin 3.10 g/dL      ALT (SGPT) 7 U/L      AST (SGOT) 12 U/L      Alkaline Phosphatase 103 U/L      Total Bilirubin 0.4 mg/dL      Globulin 3.1 gm/dL      A/G Ratio 1.0 g/dL      BUN/Creatinine Ratio 20.5     Anion Gap 12.0 mmol/L      eGFR 58.9 mL/min/1.73      Comment: National Kidney Foundation and American Society of Nephrology (ASN) Task Force recommended calculation based on the Chronic Kidney Disease Epidemiology Collaboration (CKD-EPI) equation refit without adjustment for race.       Narrative:      GFR Normal >60  Chronic Kidney Disease <60  Kidney Failure <15      Vancomycin, Peak [[id number]]  (Abnormal) Collected: 06/15/22 2334    Specimen: Blood Updated: 06/16/22 0006     Vancomycin Peak 15.70 mcg/mL     Narrative:      Therapeutic Ranges for Vancomycin    Vancomycin Random   5.0-40.0 mcg/mL  Vancomycin Trough   5.0-20.0 mcg/mL  Vancomycin Peak     20.0-40.0 mcg/mL    Legionella Antigen, Urine - Urine, Urine, Clean Catch [[id number]]  (Normal) Collected: 06/15/22 2150    Specimen: Urine, Clean Catch Updated: 06/15/22 2257     LEGIONELLA ANTIGEN, URINE Negative    S. Pneumo Ag Urine or CSF - Urine, Urine, Clean Catch [[id number]]  (Normal) Collected: 06/15/22 2150    Specimen: Urine, Clean Catch Updated: 06/15/22 2257     Strep Pneumo Ag Negative    POC Glucose Once [[id number]]   (Abnormal) Collected: 06/15/22 2220    Specimen: Blood Updated: 06/15/22 2221     Glucose 384 mg/dL      Comment: Serial Number: [id number]:  [id number]       POC Glucose Once [[id number]]  (Abnormal) Collected: 06/15/22 2022    Specimen: Blood Updated: 06/15/22 2023     Glucose 417 mg/dL      Comment: Serial Number: [id number]:  [id number]       MRSA Screen, PCR (Inpatient) - Swab, Nares [[id number]]  (Abnormal) Collected: 06/15/22 1532    Specimen: Swab from Nares Updated: 06/15/22 1753     MRSA PCR MRSA Detected    Narrative:      The negative predictive value of this diagnostic test is high and should only be used to consider de-escalating anti-MRSA therapy. A positive result may indicate colonization with MRSA and must be correlated clinically.    POC Glucose Once [[id number]]  (Abnormal) Collected: 06/15/22 1709    Specimen: Blood Updated: 06/15/22 1710     Glucose 434 mg/dL      Comment: Serial Number: [id number]:  [id number]              Imaging Results (Last 24 Hours)     ** No results found for the last 24 hours. **               I reviewed the patient's new clinical results.    Medication Review:   Scheduled Meds:apixaban, 5 mg, Oral, Q12H  arformoterol, 15 mcg, Nebulization, BID - RT  aspirin, 81 mg, Oral, Daily  atorvastatin, 40 mg, Oral, Nightly  bacitracin, 1 application, Topical, Q12H  budesonide, 0.5 mg, Nebulization, BID - RT  cefepime, 2 g, Intravenous, Q8H  docusate sodium, 100 mg, Oral, BID  famotidine, 20 mg, Oral, BID AC  furosemide, 40 mg, Oral, QAM  glipizide, 5 mg, Oral, BID AC  guaiFENesin, 600 mg, Oral, Q12H  insulin lispro, 0-24 Units, Subcutaneous, TID AC  insulin regular, 8 Units, Subcutaneous, TID AC  isosorbide mononitrate, 120 mg, Oral, Q24H  methylPREDNISolone sodium succinate, 40 mg, Intravenous, Q12H  metOLazone, 5 mg, Oral, Once per day on Mon Wed Fri  metoprolol tartrate, 25 mg, Oral, Q12H  mirtazapine, 30 mg, Oral, Nightly  pregabalin, 150 mg, Oral,  BID  ranolazine, 500 mg, Oral, BID  rOPINIRole, 0.5 mg, Oral, Nightly  sodium chloride, 10 mL, Intravenous, Q12H  traZODone, 100 mg, Oral, Nightly  vancomycin, 1,750 mg, Intravenous, Q24H      Continuous Infusions:Pharmacy to Dose Cefepime,   Pharmacy to dose vancomycin,       PRN Meds:.•  acetaminophen **OR** acetaminophen **OR** acetaminophen  •  albuterol  •  benzonatate  •  dextrose  •  dextrose  •  glucagon (human recombinant)  •  HYDROmorphone  •  insulin lispro **AND** insulin lispro  •  ipratropium-albuterol  •  ondansetron **OR** ondansetron  •  Pharmacy to Dose Cefepime  •  Pharmacy to dose vancomycin  •  sodium chloride     Assessment & Plan       Pneumonia of right lung due to infectious organism, unspecified part of lung    Chronic low back pain    Atrial fibrillation (HCC)    Chronic obstructive pulmonary disease (HCC)    COPD with exacerbation (HCC)    CAD (coronary artery disease)    Diabetes mellitus with coincident hypertension (HCC)    (HFpEF) heart failure with preserved ejection fraction (HCC)    Chronic low back pain  Reflux aspiration pneumonia -continue vancomycin and cefepime; MRSA screen is positive  Acute hypoxemia -6-minute walk before discharge  Ventral hernia  Presence of permanent pacemaker  Steroid-induced hyperglycemia -adding regular insulin with meals; restarting sulfonylurea  Positive blood culture -1 out of 2 bottles is positive with a staph species; likely contaminant;   Obesity -counseling        Plan for disposition: Home with home health    Jeane Sanders MD  06/16/22  15:37 EDT

## 2022-06-16 NOTE — CASE MANAGEMENT/SOCIAL WORK
Continued Stay Note  KATLYN Devlin     Patient Name: Ro Nathan  MRN: [id number]  Today's Date: 6/16/2022    Admit Date: 6/15/2022     Discharge Plan     Row Name 06/16/22 1124       Plan    Plan D/C plan: Anticipate home, current with [facility]. PT eval pending. Watch for oxygen needs.    Patient/Family in Agreement with Plan yes    Plan Comments Anticipate routine home when medically ready. Pt is current with [facility]. Watch for oxygen needs at time of d/c, will need 6 min walk if requiring oxygen.            Phone communication or documentation only - no physical contact with patient or family.               BARTOLO MORRISSEY RN

## 2022-06-16 NOTE — THERAPY EVALUATION
Patient Name: Ro Nathan  : 1945    MRN: [id number]                              Today's Date: 2022       Admit Date: 6/15/2022    Visit Dx:     ICD-10-CM ICD-9-CM   1. Pneumonia of right lung due to infectious organism, unspecified part of lung  J18.9 483.8   2. COPD exacerbation (HCC)  J44.1 491.21   3. Abrasion of knee, unspecified laterality, initial encounter  S80.219A 916.0     Patient Active Problem List   Diagnosis   • Skin ulcer (HCC)   • Chronic low back pain   • DDD (degenerative disc disease), lumbar   • Spondylosis of lumbosacral region   • Atrial fibrillation (HCC)   • Chronic obstructive pulmonary disease (HCC)   • Atherosclerosis of native coronary artery of native heart with angina pectoris (HCC)   • Gastroesophageal reflux disease   • Lumbar radiculopathy   • Other hammer toe(s) (acquired), right foot   • Presence of cardiac pacemaker   • Status post coronary artery stent placement   • COPD with exacerbation (HCC)   • Stented coronary artery   • Small bowel obstruction due to adhesions (HCC)   • Elevated lipoprotein(a)   • Acute generalized abdominal pain   • Diabetic foot ulcer (HCC)   • Ventral hernia with obstruction but no gangrene   • DM type 2 with diabetic dyslipidemia (HCC)   • Peripheral vascular disease due to secondary diabetes (HCC)   • Benign hypertension with CKD (chronic kidney disease) stage III (HCC)   • Stage 3a chronic kidney disease (HCC)   • Type 2 diabetes, controlled, with neuropathy (HCC)   • Chronic bronchitis with COPD (chronic obstructive pulmonary disease) (HCC)   • 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)   • Chronic respiratory failure with hypoxia (HCC)   • Diabetic ulcer of right midfoot associated with type 2 diabetes mellitus (HCC)   • Dyspnea on exertion   • 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)   • Ischemic ulcer of foot with fat layer exposed, right (HCC)   • Right foot ulcer (HCC)   • Arthritis   • CHF due to  valvular disease (HCC)   • Hiatal hernia   • Presence of coronary angioplasty implant and graft   • CAD (coronary artery disease)   • Chronic obstructive bronchitis (HCC)   • Degeneration of lumbar intervertebral disc   • Ischemic ulcer of foot (HCC)   • Diabetes mellitus with coincident hypertension (HCC)   • Benign hypertension with chronic kidney disease   • Acquired hallux malleus   • Peripheral vascular disease due to secondary diabetes mellitus (HCC)   • Obstruction of small intestine due to peritoneal adhesion (HCC)   • Lumbosacral spondylosis   • Obstructed ventral hernia   • Foot ulceration, right, with necrosis of bone (HCC)   • Ulcer of right foot with bone involvement without evidence of necrosis (HCC)   • Diarrhea   • Abdominal bloating   • Acute abdominal pain   • AV block, complete (HCC)   • Sepsis, unspecified   • Cellulitis of abdominal wall   • Angina of effort (HCC)   • Chronic renal insufficiency   • Long term current use of anticoagulants with INR goal of 2.0-3.0   • Alcoholic cirrhosis of liver without ascites (HCC)   • Pneumonia due to infectious organism, unspecified laterality, unspecified part of lung   • Non-healing surgical wound   • Long term (current) use of inhaled steroids   • Personal history of nicotine dependence   • Chronic systolic heart failure (CMS/HCC)   • Dyspnea, unspecified type   • Pneumonia of right lung due to infectious organism, unspecified part of lung   • (HFpEF) heart failure with preserved ejection fraction (HCC)   • Chronic low back pain     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) 10/26/2020   • Benign hypertension with CKD (chronic kidney disease) stage III (HCC) 10/26/2020   • Bruises easily    • CHF (congestive heart failure) (HCC)    • Chronic bronchitis with COPD (chronic obstructive pulmonary disease) (HCC) 10/26/2020   • Chronic respiratory failure with hypoxia ([facility]) 10/26/2020   • Congenital heart defect     • Difficulty attaining erection    • Heart block    • Hernia of abdominal wall    • Hypertension    • Low back pain    • Pacemaker    • Peripheral vascular disease due to secondary diabetes ([facility]) 10/26/2020   • Poor circulation    • Prostate cancer ([facility])     prostate   • Shortness of breath    • Sleep apnea     using CPAP    • Stage 3a chronic kidney disease ([facility]) 10/26/2020   • Stented coronary artery 12/05/2019    MICHAEL to LAD   • Type 2 diabetes, controlled, with neuropathy ([facility]) 10/26/2020    no meds   • Type 2 diabetes, controlled, with neuropathy ([facility]) 10/26/2020     Past Surgical History:   Procedure Laterality Date   • ABDOMINAL SURGERY     • APPENDECTOMY     • BONE CURETTAGE Right 1/22/2021    Procedure: Wound excision with fifth metatarsal head resection, right foot.;  Surgeon: Josef Egan DPM;  Location: [facility] MAIN OR;  Service: Podiatry;  Laterality: Right;   • BONE INCISION AND DRAINAGE Right 10/5/2020    Procedure: Incision and drainage with debridement of all nonviable soft tissue and bone with bone biopsy, right foot.;  Surgeon: Josef Egan DPM;  Location: [facility] MAIN OR;  Service: Podiatry;  Laterality: Right;   • BRONCHOSCOPY N/A 4/23/2022    Procedure: BRONCHOSCOPY with bronchioalveolar lavage of right lower lobe;  Surgeon: Edwin Kline MD;  Location: [facility] ENDOSCOPY;  Service: Pulmonary;  Laterality: N/A;  pneumonia   • CARDIAC CATHETERIZATION N/A 12/5/2019    Procedure: Left Heart Cath;  Surgeon: Mirza Munson MD;  Location: [facility] CATH INVASIVE LOCATION;  Service: Cardiology   • CARDIAC CATHETERIZATION N/A 12/5/2019    Procedure: Stent MICHAEL coronary;  Surgeon: Mirza Munson MD;  Location: [facility] CATH INVASIVE LOCATION;  Service: Cardiology   • CARDIAC CATHETERIZATION N/A 10/18/2021    Procedure: Left Heart Cath and coronary angiogram;  Surgeon: Celine Swanson MD;  Location: [facility] CATH INVASIVE LOCATION;  Service: Cardiovascular;  Laterality: N/A;   •  CARDIAC CATHETERIZATION N/A 10/18/2021    Procedure: Right Heart Cath;  Surgeon: Celine Swanson MD;  Location: [facility] CATH INVASIVE LOCATION;  Service: Cardiovascular;  Laterality: N/A;   • CHOLECYSTECTOMY     • ELBOW PROCEDURE      left   • FOOT SURGERY      right foot   • HERNIA REPAIR     • INCISION AND DRAINAGE OF WOUND Right 4/6/2021    Procedure: INCISION AND DRAINAGE OF RIGHT FOOT 5TH METATARSAL TO BONE AND PARTIAL 5TH METATARSAL RESECTION;  Surgeon: Josef Egan DPM;  Location: [facility] MAIN OR;  Service: Podiatry;  Laterality: Right;   • INCISION AND DRAINAGE OF WOUND Right 4/8/2021    Procedure: INCISION AND DRAINAGE BONE, DELAYED PRIMARY CLOSURE;  Surgeon: Josef Egan DPM;  Location: [facility] MAIN OR;  Service: Podiatry;  Laterality: Right;   • INTERVENTIONAL RADIOLOGY PROCEDURE N/A 12/5/2019    Procedure: Intravascular Ultrasound;  Surgeon: Mirza Munson MD;  Location: [facility] CATH INVASIVE LOCATION;  Service: Cardiology   • PACEMAKER IMPLANTATION     • TX RT/LT HEART CATHETERS N/A 12/5/2019    Procedure: Percutaneous Coronary Intervention;  Surgeon: Mirza Munson MD;  Location: [facility] CATH INVASIVE LOCATION;  Service: Cardiology   • WOUND DEBRIDEMENT Right 10/29/2020    Procedure: Preparation and debridement of foot wound with application of Integra wound graft, right foot.;  Surgeon: Josef Egan DPM;  Location: [facility] MAIN OR;  Service: Podiatry;  Laterality: Right;   • WOUND DEBRIDEMENT N/A 7/29/2021    Procedure: EXCISIONAL ABDOMINAL WOUND  DEBRIDEMENT;  Surgeon: Cleopatar Wang MD;  Location: [facility] MAIN OR;  Service: General;  Laterality: N/A;   • WOUND DEBRIDEMENT N/A 8/1/2021    Procedure: DEBRIDEMENT OF ABDOMINAL WALL;  Surgeon: Hill Bhatia DO;  Location: [facility] MAIN OR;  Service: General;  Laterality: N/A;      General Information     Row Name 06/16/22 8232          Physical Therapy Time and Intention    Document Type evaluation  -CL     Mode of  Treatment individual therapy;physical therapy  -CL     Row Name 06/16/22 1304          General Information    Patient Profile Reviewed yes  -CL     Prior Level of Function independent:;gait;transfer;community mobility  No AD in home; uses RW in community  -CL     Existing Precautions/Restrictions other (see comments)  contact precautions  -CL     Barriers to Rehab medically complex  -CL     Row Name 06/16/22 1304          Living Environment    People in Home spouse  -CL     Name(s) of People in Home Meghan Camarillo  -CL     Row Name 06/16/22 1304          Home Main Entrance    Number of Stairs, Main Entrance none  -CL     Row Name 06/16/22 1304          Stairs Within Home, Primary    Number of Stairs, Within Home, Primary none  -CL           User Key  (r) = Recorded By, (t) = Taken By, (c) = Cosigned By    Initials Name Provider Type    Ira Mahmood, PT Physical Therapist               Mobility     Row Name 06/16/22 1305          Bed Mobility    Comment, (Bed Mobility) Pt sitting EOB upon arrival  -CL     Row Name 06/16/22 1305          Bed-Chair Transfer    Bed-Chair Sultan (Transfers) contact guard  -CL     Row Name 06/16/22 1305          Sit-Stand Transfer    Sit-Stand Sultan (Transfers) contact guard  -CL     Row Name 06/16/22 1305          Gait/Stairs (Locomotion)    Sultan Level (Gait) contact guard  -CL     Distance in Feet (Gait) 40' x 2  -CL           User Key  (r) = Recorded By, (t) = Taken By, (c) = Cosigned By    Initials Name Provider Type    Ira Mahmood PT Physical Therapist               Obj/Interventions     Row Name 06/16/22 1305          Range of Motion Comprehensive    General Range of Motion no range of motion deficits identified  -CL     Row Name 06/16/22 1305          Strength Comprehensive (MMT)    General Manual Muscle Testing (MMT) Assessment no strength deficits identified  -CL     Row Name 06/16/22 1305          Balance    Balance Assessment sitting dynamic  balance  -CL     Dynamic Sitting Balance independent  -CL     Position, Sitting Balance sitting edge of bed  -CL     Row Name 06/16/22 1305          Sensory Assessment (Somatosensory)    Sensory Assessment (Somatosensory) sensation intact  -CL           User Key  (r) = Recorded By, (t) = Taken By, (c) = Cosigned By    Initials Name Provider Type    CL Ira Wagner, PT Physical Therapist               Goals/Plan     Row Name 06/16/22 1311          Bed Mobility Goal 1 (PT)    Activity/Assistive Device (Bed Mobility Goal 1, PT) bed mobility activities, all  -CL     Imperial Level/Cues Needed (Bed Mobility Goal 1, PT) independent  -CL     Time Frame (Bed Mobility Goal 1, PT) 2 weeks  -CL     Row Name 06/16/22 1311          Transfer Goal 1 (PT)    Activity/Assistive Device (Transfer Goal 1, PT) transfers, all  -CL     Imperial Level/Cues Needed (Transfer Goal 1, PT) independent  -CL     Time Frame (Transfer Goal 1, PT) 2 weeks  -CL     Row Name 06/16/22 1311          Gait Training Goal 1 (PT)    Activity/Assistive Device (Gait Training Goal 1, PT) gait (walking locomotion)  -CL     Imperial Level (Gait Training Goal 1, PT) independent  -CL     Distance (Gait Training Goal 1, PT) 100'  -CL     Time Frame (Gait Training Goal 1, PT) 2 weeks  -CL     Row Name 06/16/22 1311          Therapy Assessment/Plan (PT)    Planned Therapy Interventions (PT) gait training;bed mobility training;patient/family education;strengthening;transfer training  -CL           User Key  (r) = Recorded By, (t) = Taken By, (c) = Cosigned By    Initials Name Provider Type    CL Ira Wagner, PT Physical Therapist               Clinical Impression     Row Name 06/16/22 1306          Pain    Pretreatment Pain Rating 4/10  -CL     Posttreatment Pain Rating 4/10  -CL     Pain Location lower  -CL     Pain Location - back  -CL     Pre/Posttreatment Pain Comment Pt states LBP is chronic only relieved with medication  -CL     Pain  Intervention(s) Repositioned  -CL     Row Name 06/16/22 1306          Plan of Care Review    Plan of Care Reviewed With patient;spouse  -CL     Outcome Evaluation PT is a 76 y.o. male admitted with fatigue, weakness and SOA. Found to have R lung PNA and COPD exacerbation. PMH significant for: COPD, heart failure, chronic LBP, CHF, HTN, Alcoholic cirrhosis w/ Ascites, CKD, DM, Prostate CA and he is s/p pacemaker. Pt lives with wife in a SSH with no HOMERO. Pt reports he was independent with ambulation premorbidly without AD but used RW in community.  Pt currently recieving  nursing for wounds.  He performed transfers and ambulation without AD with CGA, walking 40' with no LOB but limited by fatigue.  He would benefit from  PT at discharge due to his multiple comorbities.  -CL     Row Name 06/16/22 1300          Therapy Assessment/Plan (PT)    Rehab Potential (PT) good, to achieve stated therapy goals  -CL     Criteria for Skilled Interventions Met (PT) yes;skilled treatment is necessary  -CL     Therapy Frequency (PT) 3 times/wk  -CL     Row Name 06/16/22 1306          Vital Signs    Pre SpO2 (%) 95  -CL     O2 Delivery Pre Treatment room air  -CL     Post SpO2 (%) 91  -CL     O2 Delivery Post Treatment room air  -CL     Row Name 06/16/22 1306          Positioning and Restraints    Pre-Treatment Position sitting in chair/recliner  -CL     Post Treatment Position bed  -CL     In Bed sitting;sitting EOB;with family/caregiver;call light within reach;encouraged to call for assist  -CL           User Key  (r) = Recorded By, (t) = Taken By, (c) = Cosigned By    Initials Name Provider Type    CL Ira Wagner, PT Physical Therapist               Outcome Measures     Row Name 06/16/22 2206          How much help from another person do you currently need...    Turning from your back to your side while in flat bed without using bedrails? 4  -CL     Moving from lying on back to sitting on the side of a flat bed without  bedrails? 3  -CL     Moving to and from a bed to a chair (including a wheelchair)? 3  -CL     Standing up from a chair using your arms (e.g., wheelchair, bedside chair)? 3  -CL     Climbing 3-5 steps with a railing? 3  -CL     To walk in hospital room? 3  -CL     AM-PAC 6 Clicks Score (PT) 19  -CL     Highest level of mobility 6 --> Walked 10 steps or more  -CL           User Key  (r) = Recorded By, (t) = Taken By, (c) = Cosigned By    Initials Name Provider Type    CL Ira Wagner, PT Physical Therapist                             Physical Therapy Education                 Title: PT OT SLP Therapies (Done)     Topic: Physical Therapy (Done)     Point: Mobility training (Done)     Learning Progress Summary           Patient Acceptance, E,TB, VU by CL at 6/16/2022 1315                   Point: Body mechanics (Done)     Learning Progress Summary           Patient Acceptance, E,TB, VU by CL at 6/16/2022 1315                   Point: Precautions (Done)     Learning Progress Summary           Patient Acceptance, E,TB, VU by CL at 6/16/2022 1315                               User Key     Initials Effective Dates Name Provider Type Discipline    CL 03/02/22 -  Ira Wagner PT Physical Therapist PT              PT Recommendation and Plan  Planned Therapy Interventions (PT): gait training, bed mobility training, patient/family education, strengthening, transfer training  Plan of Care Reviewed With: patient, spouse  Outcome Evaluation: PT is a 76 y.o. male admitted with fatigue, weakness and SOA. Found to have R lung PNA and COPD exacerbation. PMH significant for: COPD, heart failure, chronic LBP, CHF, HTN, Alcoholic cirrhosis w/ Ascites, CKD, DM, Prostate CA and he is s/p pacemaker. Pt lives with wife in a SSH with no HOMERO. Pt reports he was independent with ambulation premorbidly without AD but used RW in community.  Pt currently recieving  nursing for wounds.  He performed transfers and ambulation without AD  with CGA, walking 40' with no LOB but limited by fatigue.  He would benefit from HH PT at discharge due to his multiple comorbities.     Time Calculation:    PT Charges     Row Name 06/16/22 1316             Time Calculation    Start Time 1057  -CL      Stop Time 1112  -CL      Time Calculation (min) 15 min  -CL      PT Received On 06/16/22  -CL      PT - Next Appointment 06/17/22  -CL      PT Goal Re-Cert Due Date 06/30/22  -CL              Time Calculation- PT    Total Timed Code Minutes- PT 0 minute(s)  -CL            User Key  (r) = Recorded By, (t) = Taken By, (c) = Cosigned By    Initials Name Provider Type    CL Ira Wagner, PT Physical Therapist              Therapy Charges for Today     Code Description Service Date Service Provider Modifiers Qty    04891217427 HC PT EVAL MOD COMPLEXITY 4 6/16/2022 Ira Wagner, PT GP 1          PT G-Codes  AM-PAC 6 Clicks Score (PT): 19    Ira Wagner PT  6/16/2022

## 2022-06-16 NOTE — PLAN OF CARE
Goal Outcome Evaluation:  Plan of Care Reviewed With: patient, spouse           Outcome Evaluation: PT is a 76 y.o. male admitted with fatigue, weakness and SOA. Found to have R lung PNA and COPD exacerbation. PMH significant for: COPD, heart failure, chronic LBP, CHF, HTN, Alcoholic cirrhosis w/ Ascites, CKD, DM, Prostate CA and he is s/p pacemaker. Pt lives with wife in a SSH with no HOMERO. Pt reports he was independent with ambulation premorbidly without AD but used RW in community.  Pt currently recieving  nursing for wounds.  He performed transfers and ambulation without AD with CGA, walking 40' with no LOB but limited by fatigue.  He would benefit from  PT at discharge due to his multiple comorbities.

## 2022-06-16 NOTE — PROGRESS NOTES
Daily Progress Note        Pneumonia of right lung due to infectious organism, unspecified part of lung    Chronic low back pain    Atrial fibrillation (HCC)    Chronic obstructive pulmonary disease (HCC)    COPD with exacerbation (HCC)    CAD (coronary artery disease)    Diabetes mellitus with coincident hypertension (HCC)    (HFpEF) heart failure with preserved ejection fraction (HCC)    Chronic low back pain      Assessment  COPD exacerbation     Recurrent right lower lobe pneumonia  -most likely secondary to reflux aspiration which is different than swallowing aspiration     Bronchoscopy completed 4/23/2022  Very thick right lower lobe secretions compatible with gastric material, action therapeutically, findings suggestive of chronic reflux disease which will explain the abnormal CAT scan noted last year. Cultures all negative except fungal positive, which is clinically insignificant.      EMBER October 2021   -suggestive of severe mitral regurgitation with RVSP 40   -Normal EF   Repeat echo 4/22/2022   -EF 40%, RVSP 40 mmHg moderate mitral and tricuspid regurgitation, biatrial enlargement    Obstructive sleep apnea  -on CPAP     4/28/2022 swallow study   - with no signs of aspiration      Nonhealing abdominal surgical wound staph coccus aureus, MRSA+     Alcoholic cirrhosis- currently resolved  Chronic kidney disease  Hypertension  Peripheral vascular disease  Diabetes mellitus  Former smoker-quit 2020        Plan  Adding Brovana    Continue to titrate oxygen- Currently on room air but at times needs 2L, does not wear O2 at home  Cefepime and vancomycin  Solu-Medrol 60 mg IV every 6 hours- decreased dose today  Mucinex  Pulmicort and DuoNebs  Electrolyte/Glycemic control  DVT/GI Prophylaxis-Eliquis for A. fib and Pepcid       LOS: 0 days     Subjective     Patient feels SOB this morning. Decreased steroids today and will continue monitoring.      Objective     Vital signs for last 24 hours:  Vitals:    06/15/22  1925 06/15/22 1942 06/16/22 0040 06/16/22 0518   BP: 127/72 111/65 123/70 144/80   BP Location: Left arm Right arm Right arm Right arm   Patient Position: Lying Lying Lying Lying   Pulse: 78 69 74 74   Resp: 18 18 18 20   Temp: 98.2 °F (36.8 °C) 97.5 °F (36.4 °C) 97.4 °F (36.3 °C) 96.7 °F (35.9 °C)   TempSrc: Oral Oral Oral Axillary   SpO2: 96% 97% 95% 94%   Weight:       Height:           Intake/Output last 3 shifts:  I/O last 3 completed shifts:  In: 1770 [P.O.:1320; IV Piggyback:450]  Out: -   Intake/Output this shift:  No intake/output data recorded.      Radiology  Imaging Results (Last 24 Hours)       ** No results found for the last 24 hours. **            Labs:  Results from last 7 days   Lab Units 06/15/22  2334   WBC 10*3/mm3 15.40*   HEMOGLOBIN g/dL 10.6*   HEMATOCRIT % 33.5*   PLATELETS 10*3/mm3 308     Results from last 7 days   Lab Units 06/15/22  2334   SODIUM mmol/L 131*   POTASSIUM mmol/L 4.2   CHLORIDE mmol/L 95*   CO2 mmol/L 24.0   BUN mg/dL 26*   CREATININE mg/dL 1.27   CALCIUM mg/dL 8.5*   BILIRUBIN mg/dL 0.4   ALK PHOS U/L 103   ALT (SGPT) U/L 7   AST (SGOT) U/L 12   GLUCOSE mg/dL 361*     Results from last 7 days   Lab Units 06/15/22  0344   PH, ARTERIAL pH units 7.379   PO2 ART mm Hg 86.5   PCO2, ARTERIAL mm Hg 48.6*   HCO3 ART mmol/L 28.7*     Results from last 7 days   Lab Units 06/15/22  2334 06/15/22  0144   ALBUMIN g/dL 3.10* 3.40*     Results from last 7 days   Lab Units 06/15/22  0144   TROPONIN T ng/mL <0.010                           Meds:   SCHEDULE  apixaban, 5 mg, Oral, Q12H  aspirin, 81 mg, Oral, Daily  atorvastatin, 40 mg, Oral, Nightly  bacitracin, 1 application, Topical, Q12H  budesonide, 0.5 mg, Nebulization, BID - RT  cefepime, 2 g, Intravenous, Q8H  docusate sodium, 100 mg, Oral, BID  famotidine, 20 mg, Oral, BID AC  furosemide, 40 mg, Oral, QAM  guaiFENesin, 600 mg, Oral, Q12H  insulin lispro, 0-24 Units, Subcutaneous, TID AC  insulin regular, 6 Units, Subcutaneous,  Q6H  isosorbide mononitrate, 120 mg, Oral, Q24H  methylPREDNISolone sodium succinate, 60 mg, Intravenous, Q6H  metOLazone, 5 mg, Oral, Once per day on Mon Wed Fri  metoprolol tartrate, 25 mg, Oral, Q12H  mirtazapine, 30 mg, Oral, Nightly  pregabalin, 150 mg, Oral, BID  ranolazine, 500 mg, Oral, BID  rOPINIRole, 0.5 mg, Oral, Nightly  sodium chloride, 10 mL, Intravenous, Q12H  traZODone, 100 mg, Oral, Nightly  vancomycin, 1,750 mg, Intravenous, Q24H      Infusions  Pharmacy to Dose Cefepime,   Pharmacy to dose vancomycin,       PRNs    acetaminophen **OR** acetaminophen **OR** acetaminophen    albuterol    benzonatate    dextrose    dextrose    glucagon (human recombinant)    HYDROmorphone    insulin lispro **AND** insulin lispro    ipratropium-albuterol    ondansetron **OR** ondansetron    Pharmacy to Dose Cefepime    Pharmacy to dose vancomycin    sodium chloride    Physical Exam:  Physical Exam  General Appearance:  Alert. No distress noted.   HEENT:  Normocephalic, without obvious abnormality, Conjunctiva/corneas clear,.  Normal external ear canals, Nares normal, no drainage     Neck:  Supple, symmetrical, trachea midline. No JVD.  Lungs /Chest wall:   Bilateral basal rhonchi, respirations unlabored symmetrical wall movement.     Heart:  Regular rate and rhythm, systolic murmur PMI left sternal border  Abdomen: Soft, non-tender, no masses, no organomegaly.    Extremities: No edema, no clubbing or cyanosis      ROS  Review of Systems  Constitutional: Negative for chills, fever and malaise/fatigue.   HENT: Negative.    Eyes: Negative.    Cardiovascular: Negative.    Respiratory: Positive for cough and shortness of breath.    Skin: Negative.    Musculoskeletal: Negative.    Gastrointestinal: Negative.    Genitourinary: Negative.    Neurological: Negative.    Psychiatric/Behavioral: Negative.            I have reviewed current clinicals.     Electronically signed by KOURTNEY Borjas, 06/16/22, 11:07 AM EDT.      The NP scribed the note for me, I have examined the patient and reviewed and verified the above findings and and I formulated the assessment and plan as documented

## 2022-06-16 NOTE — PLAN OF CARE
Goal Outcome Evaluation:               Patient currently resting abed, no distress noted. Patient has voiced complaints of lower back pain, requesting Dilaudid for pain. Patient has PRN Dilaudid orders and medication administered and effective. Patient on IV antibiotics and tolerating well. Will continue to monitor.

## 2022-06-16 NOTE — OUTREACH NOTE
COPD/PN Week 2 Survey    Flowsheet Row Responses   [facility] patient discharged from? [facility]   Does the patient have one of the following disease processes/diagnoses(primary or secondary)? COPD/Pneumonia   Was the primary reason for admission: COPD exacerbation   Week 2 attempt successful? No   Revoke Readmitted          TOÑO CLAUDIO - Registered Nurse

## 2022-06-17 PROBLEM — R73.9 STEROID-INDUCED HYPERGLYCEMIA: Status: ACTIVE | Noted: 2022-01-01

## 2022-06-17 PROBLEM — T38.0X5A STEROID-INDUCED HYPERGLYCEMIA: Status: ACTIVE | Noted: 2022-01-01

## 2022-06-17 PROBLEM — R78.81 POSITIVE BLOOD CULTURE: Status: ACTIVE | Noted: 2022-01-01

## 2022-06-17 NOTE — SIGNIFICANT NOTE
Post Fall Note    Patient: Ro Nathan   Location: 300/1    Time of fall: 1206    Date of fall: 6/17/2022    Location of the fall: patient room 300    Describe the Fall: patient was getting up unassisted. Went to use the bedside table to get up and it slipped out from under him. Bed was not in the fully locked position and slipped out from under him as well.     Interventions in place prior to fall: Call Light Within Reach, Encourage Use of Call Light, Bed Side Rails x1, x2, x3, Bed/Chair in Lowest Position, Non-Skid Footwear, Items within Reach, Room Clutter Free and Adequate Lighting Provided    Was the fall witnessed? No    Where was the patient found?: on the floor in front of the bed.     Patient position when found?: patient was laying on the ground proped up on elbows in front of the bed    If witnessed, what part of the body made contact with the floor or other object? n/a    Injury: Yes     Is the patient having any pain?: No    Level of Consciousness: awake, alert and oriented     Post fall assessment: 20    Physician notified: Yes    Name of physician: will call dr. mobley    Family notified: Yes    Name of family member notified: wife. Will notify on her arrival to the unit.     Post fall precautions in place: Patient/ Family Educated, Call Light Within Reach, Encourage Use of Call Light, Bed Side Rails x1, x2, x3, Bed/Chair in Lowest Position, Bed/Chair Locked, Bed Alarm Set, Falls Wrist Band Applied, Falls Gown Applied, Fall Risk Care Plan Active, Non-Skid Footwear, Room Clutter Free, Items within Reach, Adequate Lighting Provided, Lift Equipment Accessible and Falls Risk Signage Present    Teaching provided: yes          Electronically signed by Nick Hancock RN, 06/17/22, 12:20 EDT.

## 2022-06-17 NOTE — CASE MANAGEMENT/SOCIAL WORK
Continued Stay Note  [facility]     Patient Name: Ro Nathan  MRN: [id number]  Today's Date: 6/17/2022    Admit Date: 6/15/2022     Discharge Plan     Row Name 06/17/22 1043       Plan    Plan D/C plan: Anticipate home, current with [facility]. Will need CAITLIN order. Watch for oxygen needs.    Patient/Family in Agreement with Plan yes    Plan Comments CM reviewed PT note, recommends home with HH. Pt is already current with [facility]. Will need CAITLIN order at time of d/c. Watch for oxygen needs at time of d/c, will need 6 min walk.            Phone communication or documentation only - no physical contact with patient or family.               BARTOLO MORRISSEY RN

## 2022-06-17 NOTE — PLAN OF CARE
Goal Outcome Evaluation:               Patient currently resting abed, no distress noted. Patient currently using 2L via nasal canula. Patient did state that he is feeling better. Patient has requested PRN pain medication, medication administered and effective. Will continue to monitor.

## 2022-06-17 NOTE — NURSING NOTE
Patient used his call light to notify staff that he had a spot on his wrist that was bleeding. Patient stated that he might have hit his wrist when he got up to use the restroom on the door frame on bed. Patient has a small skin tear to his right wrist. The small area of skin is in place. Area cleaned, border guaze placed to cover the area.

## 2022-06-17 NOTE — PROGRESS NOTES
LOS: 0 days   Patient Care Team:  [facility]us[facility] PCP [facility]iology[facility]  Moise Watson MD as Consulting Physician (Cardiac Electrophysiology)  Izzy Alvarez MD as Consulting Physician (Nephrology)  Celine Swanson MD as Consulting Physician (Cardiology)    Subjective     Interval History:    Patient Complaints: improving     History taken from: patient    Review of Systems   Constitutional: Positive for activity change and fatigue. Negative for appetite change and fever.   HENT: Negative for trouble swallowing.    Eyes: Negative for visual disturbance.   Respiratory: Positive for cough and shortness of breath.    Cardiovascular: Negative for chest pain, palpitations and leg swelling.   Gastrointestinal: Negative for abdominal pain, constipation, diarrhea, nausea and vomiting.   Genitourinary: Negative for difficulty urinating.   Musculoskeletal: Positive for arthralgias and back pain.   Neurological: Negative for headaches.   Psychiatric/Behavioral: Negative for confusion.           Objective     Vital Signs  Temp:  [96.8 °F (36 °C)-97.8 °F (36.6 °C)] 97.8 °F (36.6 °C)  Heart Rate:  [] 86  Resp:  [16-20] 20  BP: ()/(50-84) 154/84    Physical Exam:     General Appearance:    Alert, cooperative, in no acute distress, obese BMI    Head:    Normocephalic, without obvious abnormality, atraumatic   Eyes:            Lids and lashes normal, conjunctivae and sclerae normal, no   icterus, no pallor, corneas clear, PERRLA   Ears:    Ears appear intact with no abnormalities noted   Throat:   No oral lesions, no thrush, oral mucosa moist   Neck:   No adenopathy, supple, trachea midline, no thyromegaly, no   carotid bruit, no JVD   Lungs:      Bibasilar rhonchi- improved  aeration  respirations regular, even and   unlabored    Heart:    Regular rhythm and normal rate, normal S1 and S2, no            murmur, no gallop, no rub, no  click   Chest Wall:    No abnormalities observed   Abdomen:     Normal bowel sounds, no masses, no organomegaly, soft        Non-tender non-distended, no guarding,   Extremities:   Moves all extremities well, trace low leg edema , no cyanosis, no             Redness   Pulses:   Pulses palpable and equal bilaterally   Skin:   No bleeding, bruising or rash   Lymph nodes:   No palpable adenopathy   Neurologic:   Cranial nerves 2 - 12 grossly intact, sensation intact, DTR       present and equal bilaterally        Results Review:    Lab Results (last 24 hours)     Procedure Component Value Units Date/Time    POC Glucose Once [[id number]]  (Abnormal) Collected: 06/17/22 1622    Specimen: Blood Updated: 06/17/22 1623     Glucose 176 mg/dL      Comment: Serial Number: [id number]:  [id number]       POC Glucose Once [[id number]]  (Abnormal) Collected: 06/17/22 1102    Specimen: Blood Updated: 06/17/22 1103     Glucose 395 mg/dL      Comment: Serial Number: [id number]:  [id number]       POC Glucose Once [[id number]]  (Abnormal) Collected: 06/17/22 0718    Specimen: Blood Updated: 06/17/22 0719     Glucose 258 mg/dL      Comment: Serial Number: [id number]:  [id number]       Blood Culture - Blood, Arm, Right [[id number]]  (Abnormal) Collected: 06/15/22 0207    Specimen: Blood from Arm, Right Updated: 06/17/22 0706     Blood Culture Staphylococcus, coagulase negative     Isolated from Anaerobic Bottle     Gram Stain Anaerobic Bottle Gram positive cocci in clusters    Narrative:      Probable contaminant requires clinical correlation, susceptibility not performed unless requested by physician.      Comprehensive Metabolic Panel [[id number]]  (Abnormal) Collected: 06/17/22 0320    Specimen: Blood Updated: 06/17/22 0520     Glucose 223 mg/dL      BUN 30 mg/dL      Creatinine 1.18 mg/dL      Sodium 131 mmol/L      Potassium 5.0 mmol/L      Comment: Slight hemolysis detected by analyzer. Results may be affected.         Chloride 97 mmol/L      CO2 23.0 mmol/L      Calcium 8.1 mg/dL      Total Protein 5.8 g/dL      Albumin 2.70 g/dL      ALT (SGPT) 5 U/L      AST (SGOT) 11 U/L      Comment: Slight hemolysis detected by analyzer. Results may be affected.        Alkaline Phosphatase 88 U/L      Total Bilirubin 0.3 mg/dL      Globulin 3.1 gm/dL      A/G Ratio 0.9 g/dL      BUN/Creatinine Ratio 25.4     Anion Gap 11.0 mmol/L      eGFR 64.3 mL/min/1.73      Comment: National Kidney Foundation and American Society of Nephrology (ASN) Task Force recommended calculation based on the Chronic Kidney Disease Epidemiology Collaboration (CKD-EPI) equation refit without adjustment for race.       Narrative:      GFR Normal >60  Chronic Kidney Disease <60  Kidney Failure <15      Vancomycin, Random [[id number]]  (Normal) Collected: 06/17/22 0320    Specimen: Blood Updated: 06/17/22 0505     Vancomycin Random 16.20 mcg/mL     Narrative:      Therapeutic Ranges for Vancomycin    Vancomycin Random   5.0-40.0 mcg/mL  Vancomycin Trough   5.0-20.0 mcg/mL  Vancomycin Peak     20.0-40.0 mcg/mL    CBC & Differential [[id number]]  (Abnormal) Collected: 06/17/22 0320    Specimen: Blood Updated: 06/17/22 0441    Narrative:      The following orders were created for panel order CBC & Differential.  Procedure                               Abnormality         Status                     ---------                               -----------         ------                     Manual Differential[[id number]]                                                         CBC Auto Differential[[id number]]        Abnormal            Final result                 Please view results for these tests on the individual orders.    CBC Auto Differential [[id number]]  (Abnormal) Collected: 06/17/22 0320    Specimen: Blood Updated: 06/17/22 0441     WBC 17.60 10*3/mm3      RBC 3.96 10*6/mm3      Hemoglobin 10.8 g/dL      Hematocrit 33.1 %      MCV 83.5 fL      MCH 27.1 pg      MCHC 32.5 g/dL       RDW 18.0 %      RDW-SD 53.4 fl      MPV 8.3 fL      Platelets 294 10*3/mm3      Neutrophil % 89.2 %      Lymphocyte % 5.3 %      Monocyte % 5.3 %      Eosinophil % 0.0 %      Basophil % 0.2 %      Neutrophils, Absolute 15.70 10*3/mm3      Lymphocytes, Absolute 0.90 10*3/mm3      Monocytes, Absolute 0.90 10*3/mm3      Eosinophils, Absolute 0.00 10*3/mm3      Basophils, Absolute 0.00 10*3/mm3      nRBC 0.2 /100 WBC     Blood Culture - Blood, Wrist, Right [[id number]]  (Normal) Collected: 06/15/22 0401    Specimen: Blood from Wrist, Right Updated: 06/17/22 0415     Blood Culture No growth at 2 days    POC Glucose Once [[id number]]  (Abnormal) Collected: 06/17/22 0327    Specimen: Blood Updated: 06/17/22 0329     Glucose 248 mg/dL      Comment: Serial Number: [id number]:  [id number]              Imaging Results (Last 24 Hours)     ** No results found for the last 24 hours. **               I reviewed the patient's new clinical results.    Medication Review:   Scheduled Meds:apixaban, 5 mg, Oral, Q12H  arformoterol, 15 mcg, Nebulization, BID - RT  aspirin, 81 mg, Oral, Daily  atorvastatin, 40 mg, Oral, Nightly  bacitracin, 1 application, Topical, Q12H  budesonide, 0.5 mg, Nebulization, BID - RT  cefepime, 2 g, Intravenous, Q8H  docusate sodium, 100 mg, Oral, BID  famotidine, 20 mg, Oral, BID AC  furosemide, 40 mg, Oral, QAM  glipizide, 5 mg, Oral, BID AC  guaiFENesin, 600 mg, Oral, Q12H  insulin lispro, 0-24 Units, Subcutaneous, TID AC  insulin regular, 8 Units, Subcutaneous, TID AC  isosorbide mononitrate, 120 mg, Oral, Q24H  methylPREDNISolone sodium succinate, 40 mg, Intravenous, Q12H  metOLazone, 5 mg, Oral, Once per day on Mon Wed Fri  metoprolol tartrate, 25 mg, Oral, Q12H  mirtazapine, 30 mg, Oral, Nightly  pregabalin, 150 mg, Oral, BID  ranolazine, 500 mg, Oral, BID  rOPINIRole, 0.5 mg, Oral, Nightly  sodium chloride, 10 mL, Intravenous, Q12H  traZODone, 100 mg, Oral, Nightly  vancomycin, 1,500 mg,  Intravenous, Q24H      Continuous Infusions:Pharmacy to Dose Cefepime,   Pharmacy to dose vancomycin,       PRN Meds:.•  acetaminophen **OR** acetaminophen **OR** acetaminophen  •  albuterol  •  benzonatate  •  dextrose  •  dextrose  •  glucagon (human recombinant)  •  HYDROmorphone  •  insulin lispro **AND** insulin lispro  •  ipratropium-albuterol  •  ondansetron **OR** ondansetron  •  Pharmacy to Dose Cefepime  •  Pharmacy to dose vancomycin  •  sodium chloride     Assessment & Plan       Pneumonia of right lung due to infectious organism, unspecified part of lung    Chronic low back pain    Atrial fibrillation (HCC)    Chronic obstructive pulmonary disease (HCC)    COPD with exacerbation (HCC)    CAD (coronary artery disease)    Diabetes mellitus with coincident hypertension (HCC)    (HFpEF) heart failure with preserved ejection fraction (HCC)    Chronic low back pain      Reflux aspiration pneumonia -continue vancomycin and cefepime; MRSA screen is positive  Acute hypoxemia -6-minute walk before discharge  Ventral hernia  Presence of permanent pacemaker  Steroid-induced hyperglycemia -adding regular insulin with meals; restarting sulfonylurea  Positive blood culture -1 out of 2 bottles is positive with a staph species; likely contaminant;   Obesity -counseling     Plan for disposition: home with home health when ready     KOURTNEY Pruitt  06/17/22  18:35 EDT

## 2022-06-17 NOTE — PLAN OF CARE
Goal Outcome Evaluation:  Patient is alert and oriented. Sitting up at bedside playing his crossword puzzles, no distress noted.  IV dilaudid alleviates patient pain in back per pt report.  Vital signs stable.   Problem: COPD (Chronic Obstructive Pulmonary Disease) Comorbidity  Goal: Maintenance of COPD Symptom Control  6/17/2022 1513 by Yulia Edmonds RN  Outcome: Ongoing, Progressing  6/17/2022 1513 by Yulia Edmonds RN  Outcome: Ongoing, Progressing     Problem: Heart Failure Comorbidity  Goal: Maintenance of Heart Failure Symptom Control  6/17/2022 1513 by Yulia Edmonds RN  Outcome: Ongoing, Progressing  6/17/2022 1513 by Yulia Edmonds RN  Outcome: Ongoing, Progressing     Problem: Obstructive Sleep Apnea Risk or Actual Comorbidity Management  Goal: Unobstructed Breathing During Sleep  6/17/2022 1513 by Yulia Edmonds RN  Outcome: Ongoing, Progressing  6/17/2022 1513 by Yulia Edmonds RN  Outcome: Ongoing, Progressing    Will continue to monitor.   Problem: Fall Injury Risk  Goal: Absence of Fall and Fall-Related Injury  6/17/2022 1513 by Yulia Edmonds RN  Outcome: Ongoing, Progressing  6/17/2022 1513 by Yulia Edmonds RN  Outcome: Ongoing, Progressing

## 2022-06-17 NOTE — PROGRESS NOTES
Daily Progress Note        Pneumonia of right lung due to infectious organism, unspecified part of lung    Chronic low back pain    Atrial fibrillation (HCC)    Chronic obstructive pulmonary disease (HCC)    COPD with exacerbation (HCC)    CAD (coronary artery disease)    Diabetes mellitus with coincident hypertension (HCC)    (HFpEF) heart failure with preserved ejection fraction (HCC)    Chronic low back pain      Assessment  COPD exacerbation     Recurrent right lower lobe pneumonia  -most likely secondary to reflux aspiration which is different than swallowing aspiration     Bronchoscopy completed 4/23/2022  Very thick right lower lobe secretions compatible with gastric material, action therapeutically, findings suggestive of chronic reflux disease which will explain the abnormal CAT scan noted last year. Cultures all negative except fungal positive, which is clinically insignificant.      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, MRSA+     H/o Alcoholic cirrhosis  Chronic kidney disease  Hypertension  Peripheral vascular disease  Diabetes mellitus  Former smoker-quit 2020        Plan  CPAP at night and as needed    Continue to titrate oxygen- Currently on  2L, does not wear O2 at home  Cefepime and vancomycin  Solu-Medrol 40 mg IV every 12 hours  Mucinex  Pulmicort, Brovana and DuoNebs  Electrolyte/Glycemic control  DVT/GI Prophylaxis-Eliquis for A. fib and Pepcid  I-S every 4 hours while awake       LOS: 0 days     Subjective     Patient feeling better today.  Continue treatment as is and monitor.      Objective     Vital signs for last 24 hours:  Vitals:    06/17/22 0633 06/17/22 0634 06/17/22 0636  06/17/22 0814   BP:    130/76   BP Location:    Left arm   Patient Position:    Lying   Pulse: 70 70 70 73   Resp: 18 18 18 16   Temp:    97.5 °F (36.4 °C)   TempSrc:    Oral   SpO2:  99%  96%   Weight:       Height:           Intake/Output last 3 shifts:  I/O last 3 completed shifts:  In: 2550 [P.O.:1900; IV Piggyback:650]  Out: -   Intake/Output this shift:  I/O this shift:  In: 120 [P.O.:120]  Out: -       Radiology  Imaging Results (Last 24 Hours)       ** No results found for the last 24 hours. **            Labs:  Results from last 7 days   Lab Units 06/17/22  0320   WBC 10*3/mm3 17.60*   HEMOGLOBIN g/dL 10.8*   HEMATOCRIT % 33.1*   PLATELETS 10*3/mm3 294     Results from last 7 days   Lab Units 06/17/22  0320   SODIUM mmol/L 131*   POTASSIUM mmol/L 5.0   CHLORIDE mmol/L 97*   CO2 mmol/L 23.0   BUN mg/dL 30*   CREATININE mg/dL 1.18   CALCIUM mg/dL 8.1*   BILIRUBIN mg/dL 0.3   ALK PHOS U/L 88   ALT (SGPT) U/L 5   AST (SGOT) U/L 11   GLUCOSE mg/dL 223*     Results from last 7 days   Lab Units 06/15/22  0344   PH, ARTERIAL pH units 7.379   PO2 ART mm Hg 86.5   PCO2, ARTERIAL mm Hg 48.6*   HCO3 ART mmol/L 28.7*     Results from last 7 days   Lab Units 06/17/22  0320 06/15/22  2334 06/15/22  0144   ALBUMIN g/dL 2.70* 3.10* 3.40*     Results from last 7 days   Lab Units 06/15/22  0144   TROPONIN T ng/mL <0.010                           Meds:   SCHEDULE  apixaban, 5 mg, Oral, Q12H  arformoterol, 15 mcg, Nebulization, BID - RT  aspirin, 81 mg, Oral, Daily  atorvastatin, 40 mg, Oral, Nightly  bacitracin, 1 application, Topical, Q12H  budesonide, 0.5 mg, Nebulization, BID - RT  cefepime, 2 g, Intravenous, Q8H  docusate sodium, 100 mg, Oral, BID  famotidine, 20 mg, Oral, BID AC  furosemide, 40 mg, Oral, QAM  glipizide, 5 mg, Oral, BID AC  guaiFENesin, 600 mg, Oral, Q12H  insulin lispro, 0-24 Units, Subcutaneous, TID AC  insulin regular, 8 Units, Subcutaneous, TID AC  isosorbide mononitrate, 120 mg, Oral,  Q24H  methylPREDNISolone sodium succinate, 40 mg, Intravenous, Q12H  metOLazone, 5 mg, Oral, Once per day on Mon Wed Fri  metoprolol tartrate, 25 mg, Oral, Q12H  mirtazapine, 30 mg, Oral, Nightly  pregabalin, 150 mg, Oral, BID  ranolazine, 500 mg, Oral, BID  rOPINIRole, 0.5 mg, Oral, Nightly  sodium chloride, 10 mL, Intravenous, Q12H  traZODone, 100 mg, Oral, Nightly  vancomycin, 1,500 mg, Intravenous, Q24H      Infusions  Pharmacy to Dose Cefepime,   Pharmacy to dose vancomycin,       PRNs    acetaminophen **OR** acetaminophen **OR** acetaminophen    albuterol    benzonatate    dextrose    dextrose    glucagon (human recombinant)    HYDROmorphone    insulin lispro **AND** insulin lispro    ipratropium-albuterol    ondansetron **OR** ondansetron    Pharmacy to Dose Cefepime    Pharmacy to dose vancomycin    sodium chloride    Physical Exam:  Physical Exam  General Appearance:  Alert. No distress noted.  Obese.  HEENT:  Normocephalic, without obvious abnormality, Conjunctiva/corneas clear,.  Normal external ear canals, Nares normal, no drainage     Neck:  Supple, symmetrical, trachea midline. No JVD.  Lungs /Chest wall:   Bilateral basal rhonchi, respirations unlabored symmetrical wall movement.     Heart:  Regular rate and rhythm, systolic murmur PMI left sternal border  Abdomen: Soft, non-tender, no masses, no organomegaly.    Extremities: No edema, no clubbing or cyanosis.  Bruised bilateral knees.      ROS  Review of Systems  Constitutional: Negative for chills, fever and malaise/fatigue.   HENT: Negative.    Eyes: Negative.    Cardiovascular: Negative.    Respiratory: Positive for cough and shortness of breath.    Skin: Negative.    Musculoskeletal: Negative.    Gastrointestinal: Negative.    Genitourinary: Negative.    Neurological: Negative.    Psychiatric/Behavioral: Negative.            I have reviewed current clinicals.     Electronically signed by KOURTNEY Borjas, 06/17/22, 10:37 AM EDT.     The NP  scribed the note for me, I have examined the patient and reviewed and verified the above findings and and I formulated the assessment and plan as documented

## 2022-06-17 NOTE — PROGRESS NOTES
"Pharmacy Antimicrobial Dosing Service    Subjective:  Ro Nathan is a 76 y.o.male admitted with R sided pneumonia. Pulm is following. Pharmacy has been consulted to dose Vancomycin and Cefepime for possible pneumonia.    PMH: COPD, DM2, CKD, recent hospitalziation      Assessment/Plan    1. Day #3 Vancomycin: Goal -600 mcg*h/mL. Regimen was adjusted from 750 mg (~8 mg/kg DBW) IV q12h to 1750 mg (~18 mg/kg DBW) IV q24h yesterday due to pt specific t1/2 being closer to 24 hours. Peak and random levels obtained following first dose; calculated AUC is slightly high at 602 with trough 17.5 mcg/mL on new regimen. Will adjust to 1500 mg (~15 mg/kg DBW) IV q24h starting at noon today. Bayesian modeling software predicts  with trough 15.1 mcg/mL on this regimen. Will obtain peak level today at 1600 and random level tomorrow with am labs.    2. Day #3 Cefepime: 2 grams IV q8h for estCrCl > 60 mL/min.    Will continue to monitor drug levels, renal function, culture and sensitivities, and patient clinical status.       Objective:  Relevant clinical data and objective history reviewed:  188 cm (74\")   127 kg (280 lb)   Ideal body weight: 82.2 kg (181 lb 3.5 oz)  Adjusted ideal body weight: 100 kg (220 lb 11.7 oz)  Body mass index is 35.95 kg/m².    Results from last 7 days   Lab Units 06/16/22  0537 06/15/22  2334   VANCOMYCIN PK mcg/mL  --  15.70*   VANCOMYCIN TR mcg/mL 10.90  --      Results from last 7 days   Lab Units 06/15/22  2334 06/15/22  0144   CREATININE mg/dL 1.27 1.27     Estimated Creatinine Clearance: 71.1 mL/min (by C-G formula based on SCr of 1.27 mg/dL).  I/O last 3 completed shifts:  In: 1770 [P.O.:1320; IV Piggyback:450]  Out: -     Results from last 7 days   Lab Units 06/15/22  2334 06/15/22  0144   WBC 10*3/mm3 15.40* 10.10     Temperature    06/15/22 1942 06/16/22 0040 06/16/22 0518   Temp: 97.5 °F (36.4 °C) 97.4 °F (36.3 °C) 96.7 °F (35.9 °C)     Baseline " culture/source/susceptibility:  Microbiology Results (last 10 days)       Procedure Component Value - Date/Time    S. Pneumo Ag Urine or CSF - Urine, Urine, Clean Catch [[id number]]  (Normal) Collected: 06/15/22 2150    Lab Status: Final result Specimen: Urine, Clean Catch Updated: 06/15/22 2257     Strep Pneumo Ag Negative    Legionella Antigen, Urine - Urine, Urine, Clean Catch [[id number]]  (Normal) Collected: 06/15/22 2150    Lab Status: Final result Specimen: Urine, Clean Catch Updated: 06/15/22 2257     LEGIONELLA ANTIGEN, URINE Negative    MRSA Screen, PCR (Inpatient) - Swab, Nares [[id number]]  (Abnormal) Collected: 06/15/22 1532    Lab Status: Final result Specimen: Swab from Nares Updated: 06/15/22 1753     MRSA PCR MRSA Detected    Narrative:      The negative predictive value of this diagnostic test is high and should only be used to consider de-escalating anti-MRSA therapy. A positive result may indicate colonization with MRSA and must be correlated clinically.    Blood Culture - Blood, Wrist, Right [[id number]]  (Normal) Collected: 06/15/22 0401    Lab Status: Preliminary result Specimen: Blood from Wrist, Right Updated: 06/16/22 0417     Blood Culture No growth at 24 hours    Blood Culture - Blood, Arm, Right [[id number]]  (Abnormal) Collected: 06/15/22 0207    Lab Status: Preliminary result Specimen: Blood from Arm, Right Updated: 06/16/22 0521     Blood Culture Abnormal Stain     Gram Stain Anaerobic Bottle Gram positive cocci in clusters    Respiratory Panel PCR w/COVID-19(SARS-CoV-2) MICHELET/JONATHAN/SMOOTH/[facility]/MAD/RANCHO In-House, NP Swab in UTM/VTM, 3-4 HR TAT - Swab, Nasopharynx [[id number]]  (Normal) Collected: 06/15/22 0207    Lab Status: Final result Specimen: Swab from Nasopharynx Updated: 06/15/22 0301     ADENOVIRUS, PCR Not Detected     Coronavirus 229E Not Detected     Coronavirus HKU1 Not Detected     Coronavirus NL63 Not Detected     Coronavirus OC43 Not Detected     COVID19 Not Detected     Human  Metapneumovirus Not Detected     Human Rhinovirus/Enterovirus Not Detected     Influenza A PCR Not Detected     Influenza B PCR Not Detected     Parainfluenza Virus 1 Not Detected     Parainfluenza Virus 2 Not Detected     Parainfluenza Virus 3 Not Detected     Parainfluenza Virus 4 Not Detected     RSV, PCR Not Detected     Bordetella pertussis pcr Not Detected     Bordetella parapertussis PCR Not Detected     Chlamydophila pneumoniae PCR Not Detected     Mycoplasma pneumo by PCR Not Detected    Narrative:      In the setting of a positive respiratory panel with a viral infection PLUS a negative procalcitonin without other underlying concern for bacterial infection, consider observing off antibiotics or discontinuation of antibiotics and continue supportive care. If the respiratory panel is positive for atypical bacterial infection (Bordetella pertussis, Chlamydophila pneumoniae, or Mycoplasma pneumoniae), consider antibiotic de-escalation to target atypical bacterial infection.    Blood Culture ID, PCR - Blood, Arm, Right [[id number]]  (Abnormal) Collected: 06/15/22 0207    Lab Status: Final result Specimen: Blood from Arm, Right Updated: 06/16/22 0520     BCID, PCR Staph spp, not aureus or lugdunesis. Identification by BCID2 PCR.     BOTTLE TYPE Anaerobic Bottle            Anti-Infectives (From admission, onward)      Ordered     Dose/Rate Route Frequency Start Stop    06/16/22 0730  vancomycin IVPB 1750 mg in 0.9% Sodium Chloride (premix) 500 mL        Ordering Provider: Jeane Sanders MD    1,750 mg  over 180 Minutes Intravenous Every 24 Hours 06/16/22 0830 06/20/22 0814    06/15/22 1143  cefepime 2 gm IVPB in 100 ml NS (MBP)        Ordering Provider: Olga Pierce APRN    2 g  200 mL/hr over 30 Minutes Intravenous Every 8 Hours 06/15/22 1400 06/25/22 1359    06/15/22 1127  Pharmacy to Dose Cefepime        Ordering Provider: Olga Pierce APRN     Does not apply Continuous PRN 06/15/22 1127 06/25/22 1126     06/15/22 1127  Pharmacy to dose vancomycin        Ordering Provider: Olga Pierce APRN     Does not apply Continuous PRN 06/15/22 1126 06/25/22 1125    06/15/22 0406  cefepime 2 gm IVPB in 100 ml NS (MBP)        Ordering Provider: Hill Whitfield MD    2 g  over 30 Minutes Intravenous Once 06/15/22 0408 06/15/22 0646    06/15/22 0406  vancomycin IVPB 1750 mg in 0.9% Sodium Chloride (premix) 500 mL        Ordering Provider: Hill Whitfield MD    20 mg/kg × 93.4 kg (Adjusted) Intravenous Once 06/15/22 0408 06/15/22 0908            Elenita Martínez, PharmD  06/16/22 07:30 EDT

## 2022-06-18 NOTE — PLAN OF CARE
Goal Outcome Evaluation:            Pt c/o LBP resolved for a short time with PRN med use. No new complaints at this time. Will continue to monitor.

## 2022-06-18 NOTE — PROGRESS NOTES
LOS: 1 day   Patient Care Team:  Yusuf Jones MD as PCP - General  PhiJoshua MD as Consulting Physician (Cardiology)  Shirley, Moise Kathleen MD as Consulting Physician (Cardiac Electrophysiology)  Izzy Alvarez MD as Consulting Physician (Nephrology)  Celine Swanson MD as Consulting Physician (Cardiology)    Subjective     Interval History: sats maintaining  on 2 L , glucose up    Patient Complaints:  Still soa with activity but improved    History taken from: patient    Review of Systems   Constitutional: Positive for activity change and fatigue. Negative for appetite change and fever.   HENT: Negative for trouble swallowing.    Eyes: Negative for visual disturbance.   Respiratory: Positive for cough and shortness of breath.    Cardiovascular: Negative for chest pain, palpitations and leg swelling.   Gastrointestinal: Negative for abdominal pain, constipation, diarrhea, nausea and vomiting.   Genitourinary: Negative for difficulty urinating.   Musculoskeletal: Negative for gait problem.   Neurological: Positive for weakness. Negative for headaches.   Psychiatric/Behavioral: Negative for confusion.           Objective     Vital Signs  Temp:  [97.2 °F (36.2 °C)-99 °F (37.2 °C)] 97.3 °F (36.3 °C)  Heart Rate:  [69-84] 80  Resp:  [16-20] 18  BP: (105-135)/(54-74) 135/74    Physical Exam:     General Appearance:    Alert, cooperative, in no acute distress,obese BMI   Head:    Normocephalic, without obvious abnormality, atraumatic   Eyes:            Lids and lashes normal, conjunctivae and sclerae normal, no   icterus, no pallor, corneas clear, PERRLA   Ears:    Ears appear intact with no abnormalities noted   Throat:   No oral lesions, no thrush, oral mucosa moist   Neck:   No adenopathy, supple, trachea midline, no thyromegaly, no   carotid bruit, no JVD   Lungs:     Improved aeration today , mild posterior wheezing ,respirations regular, even and                  unlabored    Heart:     Regular rhythm and normal rate, normal S1 and S2, no            murmur, no gallop, no rub, no click   Chest Wall:    No abnormalities observed   Abdomen:     Normal bowel sounds, no masses, no organomegaly, soft        Non-tender non-distended, no guarding,   Extremities:   Moves all extremities well,  Knees with dressings in place, trace edema low legs  no cyanosis, no             Redness   Pulses:   Pulses palpable and equal bilaterally   Skin:   No bleeding, bruising or rash   Lymph nodes:   No palpable adenopathy   Neurologic:   Cranial nerves 2 - 12 grossly intact, sensation intact, DTR       present and equal bilaterally        Results Review:    Lab Results (last 24 hours)     Procedure Component Value Units Date/Time    POC Glucose Once [[id number]]  (Abnormal) Collected: 06/18/22 1609    Specimen: Blood Updated: 06/18/22 1611     Glucose 293 mg/dL      Comment: Serial Number: [id number]:  [id number]       POC Glucose Once [[id number]]  (Abnormal) Collected: 06/18/22 1120    Specimen: Blood Updated: 06/18/22 1121     Glucose 399 mg/dL      Comment: Serial Number: [id number]:  [id number]       POC Glucose Once [[id number]]  (Abnormal) Collected: 06/18/22 0723    Specimen: Blood Updated: 06/18/22 0724     Glucose 255 mg/dL      Comment: Serial Number: [id number]:  [id number]       CBC & Differential [[id number]]  (Abnormal) Collected: 06/18/22 0311    Specimen: Blood Updated: 06/18/22 0612    Narrative:      The following orders were created for panel order CBC & Differential.  Procedure                               Abnormality         Status                     ---------                               -----------         ------                     Manual Differential[[id number]]          Abnormal            Final result               CBC Auto Differential[[id number]]        Abnormal            Final result                 Please view results for these tests on the individual orders.    CBC Auto  Differential [[id number]]  (Abnormal) Collected: 06/18/22 0311    Specimen: Blood Updated: 06/18/22 0612     WBC 10.80 10*3/mm3      RBC 3.93 10*6/mm3      Hemoglobin 10.8 g/dL      Hematocrit 32.6 %      MCV 82.9 fL      MCH 27.6 pg      MCHC 33.2 g/dL      RDW 17.8 %      RDW-SD 52.1 fl      MPV 8.2 fL      Platelets 260 10*3/mm3     Manual Differential [[id number]]  (Abnormal) Collected: 06/18/22 0311    Specimen: Blood Updated: 06/18/22 0612     Neutrophil % 82.0 %      Lymphocyte % 9.0 %      Monocyte % 3.0 %      Bands %  6.0 %      Neutrophils Absolute 9.50 10*3/mm3      Lymphocytes Absolute 0.97 10*3/mm3      Monocytes Absolute 0.32 10*3/mm3      Elliptocytes Mod/2+     Vacuolated Neutrophils Slight/1+     Giant Platelets Slight/1+    Comprehensive Metabolic Panel [[id number]]  (Abnormal) Collected: 06/18/22 0311    Specimen: Blood Updated: 06/18/22 0428     Glucose 314 mg/dL      BUN 33 mg/dL      Creatinine 1.12 mg/dL      Sodium 129 mmol/L      Potassium 4.9 mmol/L      Comment: Slight hemolysis detected by analyzer. Results may be affected.        Chloride 97 mmol/L      CO2 23.0 mmol/L      Calcium 8.2 mg/dL      Total Protein 5.8 g/dL      Albumin 3.00 g/dL      ALT (SGPT) 6 U/L      AST (SGOT) 12 U/L      Comment: Slight hemolysis detected by analyzer. Results may be affected.        Alkaline Phosphatase 99 U/L      Total Bilirubin 0.3 mg/dL      Globulin 2.8 gm/dL      A/G Ratio 1.1 g/dL      BUN/Creatinine Ratio 29.5     Anion Gap 9.0 mmol/L      eGFR 68.5 mL/min/1.73      Comment: National Kidney Foundation and American Society of Nephrology (ASN) Task Force recommended calculation based on the Chronic Kidney Disease Epidemiology Collaboration (CKD-EPI) equation refit without adjustment for race.       Narrative:      GFR Normal >60  Chronic Kidney Disease <60  Kidney Failure <15      Vancomycin, Random [[id number]]  (Normal) Collected: 06/18/22 0311    Specimen: Blood Updated: 06/18/22 0427      Vancomycin Random 33.30 mcg/mL     Narrative:      Therapeutic Ranges for Vancomycin    Vancomycin Random   5.0-40.0 mcg/mL  Vancomycin Trough   5.0-20.0 mcg/mL  Vancomycin Peak     20.0-40.0 mcg/mL    Blood Culture - Blood, Wrist, Right [[id number]]  (Normal) Collected: 06/15/22 0401    Specimen: Blood from Wrist, Right Updated: 06/18/22 0417     Blood Culture No growth at 3 days    Vancomycin, Peak Do not cancel as duplicate, I need this level in addition to level this morning [[id number]]  (Normal) Collected: 06/17/22 1729    Specimen: Blood Updated: 06/17/22 1902     Vancomycin Peak 32.10 mcg/mL     Narrative:      Therapeutic Ranges for Vancomycin    Vancomycin Random   5.0-40.0 mcg/mL  Vancomycin Trough   5.0-20.0 mcg/mL  Vancomycin Peak     20.0-40.0 mcg/mL           Imaging Results (Last 24 Hours)     ** No results found for the last 24 hours. **               I reviewed the patient's new clinical results.    Medication Review:   Scheduled Meds:apixaban, 5 mg, Oral, Q12H  aspirin, 81 mg, Oral, Daily  atorvastatin, 40 mg, Oral, Nightly  bacitracin, 1 application, Topical, Q12H  budesonide-formoterol, 2 puff, Inhalation, BID - RT  cefuroxime, 500 mg, Oral, Q12H  docusate sodium, 100 mg, Oral, BID  famotidine, 20 mg, Oral, BID AC  furosemide, 40 mg, Oral, QAM  glipizide, 5 mg, Oral, BID AC  guaiFENesin, 600 mg, Oral, Q12H  insulin lispro, 0-24 Units, Subcutaneous, TID AC  insulin regular, 8 Units, Subcutaneous, TID AC  isosorbide mononitrate, 120 mg, Oral, Q24H  metOLazone, 5 mg, Oral, Once per day on Mon Wed Fri  metoprolol tartrate, 25 mg, Oral, Q12H  mirtazapine, 30 mg, Oral, Nightly  predniSONE, 30 mg, Oral, Daily   Followed by  [START ON 6/20/2022] predniSONE, 20 mg, Oral, Daily   Followed by  [START ON 6/22/2022] predniSONE, 10 mg, Oral, Daily  pregabalin, 150 mg, Oral, BID  ranolazine, 500 mg, Oral, BID  rOPINIRole, 0.5 mg, Oral, Nightly  sodium chloride, 10 mL, Intravenous, Q12H  traZODone, 100 mg, Oral,  Nightly      Continuous Infusions:   PRN Meds:.•  acetaminophen **OR** acetaminophen **OR** acetaminophen  •  albuterol  •  benzonatate  •  dextrose  •  dextrose  •  glucagon (human recombinant)  •  HYDROmorphone  •  insulin lispro **AND** insulin lispro  •  ipratropium-albuterol  •  ondansetron **OR** ondansetron  •  sodium chloride     Assessment & Plan       Pneumonia of right lung due to infectious organism, unspecified part of lung    Chronic low back pain    Atrial fibrillation (HCC)    Chronic obstructive pulmonary disease (HCC)    COPD with exacerbation (HCC)    CAD (coronary artery disease)    Diabetes mellitus with coincident hypertension (HCC)    (HFpEF) heart failure with preserved ejection fraction (HCC)    Chronic low back pain    Steroid-induced hyperglycemia    Positive blood culture      COPD exacerbation - steroids tapered   Reflux aspiration - off IV abx and on po ceftin per pulmonology - needs 6 min walk at DC.   Ventral hernia  Presence of pacemaker  T2dm with hyperglycemia- steroid induced pt haf lantus today - continue SSI hoping this improved as steroids deescalate. It appears he is not on insulin at home.   Hyponatremia- r/t hyperglycemia will monitor, sodium correction - 132      Plan for disposition: home soon with home health     Olga Pierce, KOURTNEY  06/18/22  18:24 EDT

## 2022-06-18 NOTE — PROGRESS NOTES
Daily Progress Note        Pneumonia of right lung due to infectious organism, unspecified part of lung    Chronic low back pain    Atrial fibrillation (HCC)    Chronic obstructive pulmonary disease (HCC)    COPD with exacerbation (HCC)    CAD (coronary artery disease)    Diabetes mellitus with coincident hypertension (HCC)    (HFpEF) heart failure with preserved ejection fraction (HCC)    Chronic low back pain    Steroid-induced hyperglycemia    Positive blood culture      Assessment  COPD exacerbation     Recurrent right lower lobe pneumonia  -most likely secondary to reflux aspiration which is different than swallowing aspiration     Bronchoscopy completed 4/23/2022  Very thick right lower lobe secretions compatible with gastric material, action therapeutically, findings suggestive of chronic reflux disease which will explain the abnormal CAT scan noted last year. Cultures all negative except fungal positive, which is clinically insignificant.      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, MRSA+     H/o Alcoholic cirrhosis  Chronic kidney disease  Hypertension  Peripheral vascular disease  Diabetes mellitus  Former smoker-quit 2020        Plan  CPAP at night and as needed  Patient is improving from pulmonary standpoint and if he does not require oxygen then he can be discharged with follow-up in our clinic 2 to 3 weeks    Continue to titrate oxygen- Currently on  2L, does not wear O2 at home, check saturation on room air and with 6-minute walk    Antibiotics: Currently on cefepime and vancomycin but the blood cultures for staph coag negative is likely contamination and repeat cultures negative, and his  white cell count is improving so we will switch antibiotics to oral for another 5 days    Solu-Medrol: Changed to tapering dose steroids  Mucinex  Pulmicort, Brovana and DuoNebs  Electrolyte/Glycemic control  DVT/GI Prophylaxis-Eliquis for A. fib and Pepcid  I-S every 4 hours while awake       LOS: 1 day     Subjective     Patient feeling better today.        Objective     Vital signs for last 24 hours:  Vitals:    06/18/22 0703 06/18/22 0704 06/18/22 0708 06/18/22 0817   BP:    105/54   BP Location:    Left arm   Patient Position:    Lying   Pulse: 70 71 71 73   Resp: 18 18 18 20   Temp:    97.6 °F (36.4 °C)   TempSrc:    Oral   SpO2: 93% 92% 92% 93%   Weight:       Height:           Intake/Output last 3 shifts:  I/O last 3 completed shifts:  In: 900 [P.O.:700; IV Piggyback:200]  Out: -   Intake/Output this shift:  I/O this shift:  In: 50 [P.O.:50]  Out: -       Radiology  Imaging Results (Last 24 Hours)     ** No results found for the last 24 hours. **          Labs:  Results from last 7 days   Lab Units 06/18/22  0311   WBC 10*3/mm3 10.80   HEMOGLOBIN g/dL 10.8*   HEMATOCRIT % 32.6*   PLATELETS 10*3/mm3 260     Results from last 7 days   Lab Units 06/18/22  0311   SODIUM mmol/L 129*   POTASSIUM mmol/L 4.9   CHLORIDE mmol/L 97*   CO2 mmol/L 23.0   BUN mg/dL 33*   CREATININE mg/dL 1.12   CALCIUM mg/dL 8.2*   BILIRUBIN mg/dL 0.3   ALK PHOS U/L 99   ALT (SGPT) U/L 6   AST (SGOT) U/L 12   GLUCOSE mg/dL 314*     Results from last 7 days   Lab Units 06/15/22  0344   PH, ARTERIAL pH units 7.379   PO2 ART mm Hg 86.5   PCO2, ARTERIAL mm Hg 48.6*   HCO3 ART mmol/L 28.7*     Results from last 7 days   Lab Units 06/18/22  0311 06/17/22  0320 06/15/22  2334   ALBUMIN g/dL 3.00* 2.70* 3.10*     Results from last 7 days   Lab Units 06/15/22  0144   TROPONIN T ng/mL <0.010                           Meds:   SCHEDULE  apixaban, 5 mg, Oral, Q12H  arformoterol, 15 mcg, Nebulization, BID - RT  aspirin, 81 mg, Oral,  Daily  atorvastatin, 40 mg, Oral, Nightly  bacitracin, 1 application, Topical, Q12H  budesonide, 0.5 mg, Nebulization, BID - RT  cefepime, 2 g, Intravenous, Q8H  docusate sodium, 100 mg, Oral, BID  famotidine, 20 mg, Oral, BID AC  furosemide, 40 mg, Oral, QAM  glipizide, 5 mg, Oral, BID AC  guaiFENesin, 600 mg, Oral, Q12H  insulin lispro, 0-24 Units, Subcutaneous, TID AC  insulin regular, 8 Units, Subcutaneous, TID AC  isosorbide mononitrate, 120 mg, Oral, Q24H  methylPREDNISolone sodium succinate, 40 mg, Intravenous, Q12H  metOLazone, 5 mg, Oral, Once per day on Mon Wed Fri  metoprolol tartrate, 25 mg, Oral, Q12H  mirtazapine, 30 mg, Oral, Nightly  pregabalin, 150 mg, Oral, BID  ranolazine, 500 mg, Oral, BID  rOPINIRole, 0.5 mg, Oral, Nightly  sodium chloride, 10 mL, Intravenous, Q12H  traZODone, 100 mg, Oral, Nightly  vancomycin, 1,500 mg, Intravenous, Q24H      Infusions  Pharmacy to Dose Cefepime,   Pharmacy to dose vancomycin,       PRNs  •  acetaminophen **OR** acetaminophen **OR** acetaminophen  •  albuterol  •  benzonatate  •  dextrose  •  dextrose  •  glucagon (human recombinant)  •  HYDROmorphone  •  insulin lispro **AND** insulin lispro  •  ipratropium-albuterol  •  ondansetron **OR** ondansetron  •  Pharmacy to Dose Cefepime  •  Pharmacy to dose vancomycin  •  sodium chloride    Physical Exam:  Physical Exam  General Appearance:  Alert. No distress noted.  Obese.  HEENT:  Normocephalic, without obvious abnormality, Conjunctiva/corneas clear,.  Normal external ear canals, Nares normal, no drainage     Neck:  Supple, symmetrical, trachea midline. No JVD.  Lungs /Chest wall: Better air entry today with decreased bilateral basal rhonchi and minimal wheezing, respirations unlabored symmetrical wall movement.     Heart:  Regular rate and rhythm, systolic murmur PMI left sternal border  Abdomen: Soft, non-tender, no masses, no organomegaly.    Extremities: No edema, no clubbing or cyanosis.  Bruised bilateral  knees.      ROS  Review of Systems  Constitutional: Negative for chills, fever and malaise/fatigue.   HENT: Negative.    Eyes: Negative.    Cardiovascular: Negative.    Respiratory: Positive for cough and shortness of breath.    Skin: Negative.    Musculoskeletal: Negative.    Gastrointestinal: Negative.    Genitourinary: Negative.    Neurological: Negative.    Psychiatric/Behavioral: Negative.            I have reviewed current clinical results

## 2022-06-19 NOTE — PLAN OF CARE
Goal Outcome Evaluation:  Plan of Care Reviewed With: patient        Progress: no change     Problem: Fall Injury Risk  Goal: Absence of Fall and Fall-Related Injury  6/19/2022 0054 by Sanchez Bentley RN  Outcome: Ongoing, Progressing  6/19/2022 0054 by Sanchez Bentley RN  Outcome: Ongoing, Progressing  6/19/2022 0053 by Sanchez Bentley RN  Outcome: Ongoing, Progressing  Intervention: Promote Injury-Free Environment  Recent Flowsheet Documentation  Taken 6/19/2022 0000 by Sanchez Bentley RN  Safety Promotion/Fall Prevention:   safety round/check completed   room organization consistent   nonskid shoes/slippers when out of bed   assistive device/personal items within reach   fall prevention program maintained  Taken 6/18/2022 2027 by Sanchez Bentley RN  Safety Promotion/Fall Prevention:   safety round/check completed   room organization consistent   nonskid shoes/slippers when out of bed   fall prevention program maintained   assistive device/personal items within reach     Problem: Asthma Comorbidity  Goal: Maintenance of Asthma Control  6/19/2022 0054 by Sanchez Bentley RN  Outcome: Ongoing, Progressing  6/19/2022 0054 by Sanchez Bentley RN  Outcome: Ongoing, Progressing  6/19/2022 0053 by Sanchez Bentley RN  Outcome: Ongoing, Progressing     Problem: Behavioral Health Comorbidity  Goal: Maintenance of Behavioral Health Symptom Control  6/19/2022 0054 by Sanchez Bentley RN  Outcome: Ongoing, Progressing  6/19/2022 0054 by Sanchez Bentley RN  Outcome: Ongoing, Progressing  6/19/2022 0053 by Sanchez Bentley RN  Outcome: Ongoing, Progressing     Problem: COPD (Chronic Obstructive Pulmonary Disease) Comorbidity  Goal: Maintenance of COPD Symptom Control  6/19/2022 0054 by Sanchze Bentley RN  Outcome: Ongoing, Progressing  6/19/2022 0054 by Sanchez Bentley RN  Outcome: Ongoing, Progressing  6/19/2022 0053 by Sanchez Bentley RN  Outcome: Ongoing, Progressing     Problem: Diabetes Comorbidity  Goal: Blood Glucose Level  Within Targeted Range  6/19/2022 0054 by Sanchez Bentley RN  Outcome: Ongoing, Progressing  6/19/2022 0054 by Sanchez Bentley RN  Outcome: Ongoing, Progressing  6/19/2022 0053 by Sanchez Bentley RN  Outcome: Ongoing, Progressing     Problem: Heart Failure Comorbidity  Goal: Maintenance of Heart Failure Symptom Control  6/19/2022 0054 by Sanchez Bentley RN  Outcome: Ongoing, Progressing  6/19/2022 0054 by Sanchez Bentley RN  Outcome: Ongoing, Progressing  6/19/2022 0053 by Sanchez Bentley RN  Outcome: Ongoing, Progressing     Problem: Hypertension Comorbidity  Goal: Blood Pressure in Desired Range  6/19/2022 0054 by Sanchez Bentley RN  Outcome: Ongoing, Progressing  6/19/2022 0054 by Sanchez Bentley RN  Outcome: Ongoing, Progressing  6/19/2022 0053 by Sanchez Bentley RN  Outcome: Ongoing, Progressing     Problem: Obstructive Sleep Apnea Risk or Actual Comorbidity Management  Goal: Unobstructed Breathing During Sleep  6/19/2022 0054 by Sanchez Bentley RN  Outcome: Ongoing, Progressing  6/19/2022 0054 by Sanchez Bentley RN  Outcome: Ongoing, Progressing  6/19/2022 0053 by Sanchez Bentley RN  Outcome: Ongoing, Progressing     Problem: Osteoarthritis Comorbidity  Goal: Maintenance of Osteoarthritis Symptom Control  6/19/2022 0054 by Sanchez Bentley RN  Outcome: Ongoing, Progressing  6/19/2022 0054 by Sanchez Bentley RN  Outcome: Ongoing, Progressing  6/19/2022 0053 by Sanchze Bentley RN  Outcome: Ongoing, Progressing     Problem: Pain Chronic (Persistent) (Comorbidity Management)  Goal: Acceptable Pain Control and Functional Ability  6/19/2022 0054 by Sanchez Bentley RN  Outcome: Ongoing, Progressing  6/19/2022 0054 by Sanchez Bentley RN  Outcome: Ongoing, Progressing  6/19/2022 0053 by Sanchez Bentley RN  Outcome: Ongoing, Progressing  Intervention: Optimize Psychosocial Wellbeing  Recent Flowsheet Documentation  Taken 6/18/2022 2027 by Sanchez Bentley RN  Diversional Activities: television     Problem: Seizure Disorder  Comorbidity  Goal: Maintenance of Seizure Control  6/19/2022 0054 by Sanchez Bentley RN  Outcome: Ongoing, Progressing  6/19/2022 0054 by Sanchez Bentley RN  Outcome: Ongoing, Progressing  6/19/2022 0053 by Sanchez Bentley RN  Outcome: Ongoing, Progressing     Problem: Skin Injury Risk Increased  Goal: Skin Health and Integrity  6/19/2022 0054 by Sanchez Bentley RN  Outcome: Ongoing, Progressing  6/19/2022 0054 by Sanchez Bentley RN  Outcome: Ongoing, Progressing  6/19/2022 0053 by Sanchez Bentley RN  Outcome: Ongoing, Progressing     Problem: Adult Inpatient Plan of Care  Goal: Plan of Care Review  6/19/2022 0054 by Sanchez Bentley RN  Outcome: Ongoing, Progressing  6/19/2022 0054 by Sanchez Bentley RN  Outcome: Ongoing, Progressing  Flowsheets (Taken 6/19/2022 0054)  Progress: no change  Plan of Care Reviewed With: patient  6/19/2022 0053 by Sanchez Bentley RN  Outcome: Ongoing, Progressing  Flowsheets (Taken 6/19/2022 0053)  Plan of Care Reviewed With: patient  Goal: Patient-Specific Goal (Individualized)  6/19/2022 0054 by Sanchez Bentley RN  Outcome: Ongoing, Progressing  6/19/2022 0054 by Sanchez Bentley RN  Outcome: Ongoing, Progressing  6/19/2022 0053 by Sanchez Bentley RN  Outcome: Ongoing, Progressing  Goal: Absence of Hospital-Acquired Illness or Injury  6/19/2022 0054 by Sanchez Bentley RN  Outcome: Ongoing, Progressing  6/19/2022 0054 by Sanchez Bentley RN  Outcome: Ongoing, Progressing  6/19/2022 0053 by Sanchez Bentley RN  Outcome: Ongoing, Progressing  Intervention: Identify and Manage Fall Risk  Recent Flowsheet Documentation  Taken 6/19/2022 0000 by Sanchez Bentley RN  Safety Promotion/Fall Prevention:   safety round/check completed   room organization consistent   nonskid shoes/slippers when out of bed   assistive device/personal items within reach   fall prevention program maintained  Taken 6/18/2022 2027 by Sanchez Bentley RN  Safety Promotion/Fall Prevention:   safety round/check completed    room organization consistent   nonskid shoes/slippers when out of bed   fall prevention program maintained   assistive device/personal items within reach  Goal: Optimal Comfort and Wellbeing  6/19/2022 0054 by Sanchez Bentley RN  Outcome: Ongoing, Progressing  6/19/2022 0054 by Sanchez Bentley RN  Outcome: Ongoing, Progressing  6/19/2022 0053 by Sanchez Bentley RN  Outcome: Ongoing, Progressing  Intervention: Provide Person-Centered Care  Recent Flowsheet Documentation  Taken 6/18/2022 2027 by Sanchez Bentley RN  Trust Relationship/Rapport: care explained  Goal: Readiness for Transition of Care  6/19/2022 0054 by Sanchez Bentley RN  Outcome: Ongoing, Progressing  6/19/2022 0054 by Sanchez Bentley RN  Outcome: Ongoing, Progressing  6/19/2022 0053 by Sanchez Bentley RN  Outcome: Ongoing, Progressing    Pt rested throughout shift. C/O pain resolved with PRN med use. No new issues at this time. Will continue to monitor.

## 2022-06-19 NOTE — PROGRESS NOTES
Daily Progress Note        Pneumonia of right lung due to infectious organism, unspecified part of lung    Chronic low back pain    Atrial fibrillation (HCC)    Chronic obstructive pulmonary disease (HCC)    COPD with exacerbation (HCC)    CAD (coronary artery disease)    Diabetes mellitus with coincident hypertension (HCC)    (HFpEF) heart failure with preserved ejection fraction (HCC)    Chronic low back pain    Steroid-induced hyperglycemia    Positive blood culture      Assessment  COPD exacerbation     Recurrent right lower lobe pneumonia  -most likely secondary to reflux aspiration which is different than swallowing aspiration     Bronchoscopy completed 4/23/2022  Very thick right lower lobe secretions compatible with gastric material, action therapeutically, findings suggestive of chronic reflux disease which will explain the abnormal CAT scan noted last year. Cultures all negative except fungal positive, which is clinically insignificant.      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, MRSA+     H/o Alcoholic cirrhosis  Chronic kidney disease  Hypertension  Peripheral vascular disease  Diabetes mellitus  Former smoker-quit 2020        Plan  CPAP at night and as needed  Patient is improving from pulmonary standpoint   follow-up in our clinic 2 to 3 weeks, patient is feeling generalized weakness and he prefers to stay another day, final discharge plans as per hospitalist    Continue to titrate oxygen- Currently on  RA, does not wear O2 at home, check saturation on room air and with 6-minute walk    Antibiotics: Currently off cefepime and vancomycin and the blood cultures for staph coag negative is  likely contamination and repeat cultures negative, and his white cell count is improving so we was switched to cefuroxime 6/18/2022 for 5 days    tapering dose steroids  Mucinex  Pulmicort, Brovana and DuoNebs  Electrolyte/Glycemic control  DVT/GI Prophylaxis-Eliquis for A. fib and Pepcid  I-S every 4 hours while awake       LOS: 2 days     Subjective     Patient reports generalized weakness but breathing better      Objective     Vital signs for last 24 hours:  Vitals:    06/19/22 0300 06/19/22 0721 06/19/22 0727 06/19/22 0819   BP: 150/88   147/82   BP Location: Left arm   Left arm   Patient Position: Lying   Lying   Pulse: 77 76 77 86   Resp: 18 18 18 18   Temp: 98.7 °F (37.1 °C)   97.7 °F (36.5 °C)   TempSrc: Oral   Oral   SpO2: 94% 94% 95% 95%   Weight:       Height:           Intake/Output last 3 shifts:  I/O last 3 completed shifts:  In: 770 [P.O.:770]  Out: -   Intake/Output this shift:  I/O this shift:  In: 100 [P.O.:100]  Out: 250 [Urine:250]      Radiology  Imaging Results (Last 24 Hours)     ** No results found for the last 24 hours. **          Labs:  Results from last 7 days   Lab Units 06/19/22  0803   WBC 10*3/mm3 11.70*   HEMOGLOBIN g/dL 11.7*   HEMATOCRIT % 35.8*   PLATELETS 10*3/mm3 309     Results from last 7 days   Lab Units 06/19/22  0803 06/19/22  0530 06/18/22  0311   SODIUM mmol/L 135*  --  129*   POTASSIUM mmol/L 4.0  --  4.9   CHLORIDE mmol/L 97*  --  97*   CO2 mmol/L 28.0  --  23.0   BUN mg/dL 30*  --  33*   CREATININE mg/dL 1.13   < > 1.12   CALCIUM mg/dL 8.7  --  8.2*   BILIRUBIN mg/dL  --   --  0.3   ALK PHOS U/L  --   --  99   ALT (SGPT) U/L  --   --  6   AST (SGOT) U/L  --   --  12   GLUCOSE mg/dL 215*  --  314*    < > = values in this interval not displayed.     Results from last 7 days   Lab Units 06/15/22  0344   PH, ARTERIAL pH units 7.379   PO2 ART mm Hg 86.5   PCO2, ARTERIAL mm Hg 48.6*   HCO3 ART mmol/L 28.7*     Results from last 7 days   Lab Units 06/18/22  0311  06/17/22  0320 06/15/22  2334   ALBUMIN g/dL 3.00* 2.70* 3.10*     Results from last 7 days   Lab Units 06/15/22  0144   TROPONIN T ng/mL <0.010                           Meds:   SCHEDULE  apixaban, 5 mg, Oral, Q12H  aspirin, 81 mg, Oral, Daily  atorvastatin, 40 mg, Oral, Nightly  bacitracin, 1 application, Topical, Q12H  budesonide-formoterol, 2 puff, Inhalation, BID - RT  cefuroxime, 500 mg, Oral, Q12H  docusate sodium, 100 mg, Oral, BID  famotidine, 20 mg, Oral, BID AC  furosemide, 40 mg, Oral, QAM  glipizide, 5 mg, Oral, BID AC  guaiFENesin, 600 mg, Oral, Q12H  insulin lispro, 0-24 Units, Subcutaneous, TID AC  insulin regular, 8 Units, Subcutaneous, TID AC  isosorbide mononitrate, 120 mg, Oral, Q24H  metOLazone, 5 mg, Oral, Once per day on Mon Wed Fri  metoprolol tartrate, 25 mg, Oral, Q12H  mirtazapine, 30 mg, Oral, Nightly  [START ON 6/20/2022] predniSONE, 20 mg, Oral, Daily   Followed by  [START ON 6/22/2022] predniSONE, 10 mg, Oral, Daily  pregabalin, 150 mg, Oral, BID  ranolazine, 500 mg, Oral, BID  rOPINIRole, 0.5 mg, Oral, Nightly  sodium chloride, 10 mL, Intravenous, Q12H  traZODone, 100 mg, Oral, Nightly      Infusions     PRNs  •  acetaminophen **OR** acetaminophen **OR** acetaminophen  •  albuterol  •  benzonatate  •  dextrose  •  dextrose  •  glucagon (human recombinant)  •  HYDROmorphone  •  insulin lispro **AND** insulin lispro  •  ipratropium-albuterol  •  ondansetron **OR** ondansetron  •  sodium chloride    Physical Exam:  Physical Exam  General Appearance:  Alert. No distress noted.  Obese.  HEENT:  Normocephalic, without obvious abnormality, Conjunctiva/corneas clear,.  Normal external ear canals, Nares normal, no drainage     Neck:  Supple, symmetrical, trachea midline. No JVD.  Lungs /Chest wall: Better air entry today with decreased bilateral basal rhonchi and minimal wheezing, respirations unlabored symmetrical wall movement.     Heart:  Regular rate and rhythm, systolic murmur PMI left  sternal border  Abdomen: Soft, non-tender, no masses, no organomegaly.    Extremities: No edema, no clubbing or cyanosis.  Ecchymosis bilateral knees.      ROS  Review of Systems  Constitutional: Negative for chills, fever and positive for malaise/fatigue.   HENT: Negative.    Eyes: Negative.    Cardiovascular: Negative.    Respiratory: Positive for cough and shortness of breath: Improving  Skin: Negative.    Musculoskeletal: Negative.    Gastrointestinal: Negative.    Genitourinary: Negative.    Neurological: Negative.    Psychiatric/Behavioral: Negative.            I have reviewed current clinical results

## 2022-06-19 NOTE — PROGRESS NOTES
LOS: 2 days   Patient Care Team:  [facility]iology[facility]  Shirley, Moise Kathleen MD as Consulting Physician (Cardiac Electrophysiology)  Izzy Alvarez MD as Consulting Physician (Nephrology)  Celine Swanson MD as Consulting Physician (Cardiology)    Subjective     Interval History:    Patient Complaints: soa improved does not feel yet ready for DC asking about outpatient PT    History taken from: patient    Review of Systems   Constitutional: Positive for activity change and fatigue. Negative for appetite change.   HENT: Negative for trouble swallowing.    Eyes: Negative for visual disturbance.   Respiratory: Positive for shortness of breath (improved). Negative for choking.    Cardiovascular: Positive for leg swelling (intemittent and  stable ). Negative for chest pain and palpitations.   Gastrointestinal: Negative for abdominal pain, constipation, diarrhea and nausea.   Genitourinary: Negative for difficulty urinating.   Musculoskeletal: Positive for arthralgias (knee with some pain post fall ), back pain and gait problem.   Neurological: Positive for weakness. Negative for headaches.   Psychiatric/Behavioral: Negative for agitation, behavioral problems and confusion.           Objective     Vital Signs  Temp:  [97.3 °F (36.3 °C)-98.7 °F (37.1 °C)] 97.5 °F (36.4 °C)  Heart Rate:  [70-86] 70  Resp:  [18] 18  BP: (120-150)/(61-88) 120/61    Physical Exam:     General Appearance:    Alert, cooperative, in no acute distress,obese BMI   Head:    Normocephalic, without obvious abnormality, atraumatic   Eyes:            Lids and lashes normal, conjunctivae and sclerae normal, no   icterus, no pallor, corneas clear, PERRLA   Ears:    Ears appear intact with no abnormalities noted   Throat:   No oral lesions, no thrush, oral mucosa moist   Neck:   No adenopathy, supple, trachea midline, no thyromegaly, no   carotid bruit, no JVD    Lungs:     Bibasilar rhonchi ,respirations regular, even and                  unlabored    Heart:    Regular rhythm and normal rate, normal S1 and S2, no            murmur, no gallop, no rub, no click   Chest Wall:    No abnormalities observed   Abdomen:     Obese abd with multiple hernias. Normal bowel sounds, no masses, no organomegaly, soft Non-tender non-distended, no guarding,   Extremities:   Moves all extremities well but weak no edema, no cyanosis, jody knees with scabbing from injury do to fall    Pulses:   Pulses palpable and equal bilaterally   Skin:   No bleeding, bruising or rash   Lymph nodes:   No palpable adenopathy   Neurologic:   Cranial nerves 2 - 12 grossly intact, sensation intact, DTR       present and equal bilaterally        Results Review:    Lab Results (last 24 hours)     Procedure Component Value Units Date/Time    POC Glucose Once [[id number]]  (Abnormal) Collected: 06/19/22 1121    Specimen: Blood Updated: 06/19/22 1122     Glucose 242 mg/dL      Comment: Serial Number: [id number]:  [id number]       Basic Metabolic Panel [[id number]]  (Abnormal) Collected: 06/19/22 0803    Specimen: Blood Updated: 06/19/22 0905     Glucose 215 mg/dL      BUN 30 mg/dL      Creatinine 1.13 mg/dL      Sodium 135 mmol/L      Potassium 4.0 mmol/L      Chloride 97 mmol/L      CO2 28.0 mmol/L      Calcium 8.7 mg/dL      BUN/Creatinine Ratio 26.5     Anion Gap 10.0 mmol/L      eGFR 67.8 mL/min/1.73      Comment: National Kidney Foundation and American Society of Nephrology (ASN) Task Force recommended calculation based on the Chronic Kidney Disease Epidemiology Collaboration (CKD-EPI) equation refit without adjustment for race.       Narrative:      GFR Normal >60  Chronic Kidney Disease <60  Kidney Failure <15      CBC & Differential [[id number]]  (Abnormal) Collected: 06/19/22 0803    Specimen: Blood Updated: 06/19/22 0845    Narrative:      The following orders were created for panel order CBC &  Differential.  Procedure                               Abnormality         Status                     ---------                               -----------         ------                     CBC Auto Differential[[id number]]        Abnormal            Final result                 Please view results for these tests on the individual orders.    CBC Auto Differential [[id number]]  (Abnormal) Collected: 06/19/22 0803    Specimen: Blood Updated: 06/19/22 0845     WBC 11.70 10*3/mm3      RBC 4.25 10*6/mm3      Hemoglobin 11.7 g/dL      Hematocrit 35.8 %      MCV 84.2 fL      MCH 27.5 pg      MCHC 32.7 g/dL      RDW 17.9 %      RDW-SD 51.6 fl      MPV 8.5 fL      Platelets 309 10*3/mm3      Neutrophil % 70.5 %      Lymphocyte % 16.8 %      Monocyte % 11.2 %      Eosinophil % 1.2 %      Basophil % 0.3 %      Neutrophils, Absolute 8.20 10*3/mm3      Lymphocytes, Absolute 2.00 10*3/mm3      Monocytes, Absolute 1.30 10*3/mm3      Eosinophils, Absolute 0.10 10*3/mm3      Basophils, Absolute 0.00 10*3/mm3      nRBC 0.0 /100 WBC     POC Glucose Once [[id number]]  (Abnormal) Collected: 06/19/22 0753    Specimen: Blood Updated: 06/19/22 0754     Glucose 209 mg/dL      Comment: Serial Number: [id number]:  [id number]       POC Glucose Once [[id number]]  (Abnormal) Collected: 06/19/22 0727    Specimen: Blood Updated: 06/19/22 0729     Glucose 187 mg/dL      Comment: Serial Number: [id number]:  [id number]       Creatinine, Serum [[id number]]  (Normal) Collected: 06/19/22 0530    Specimen: Blood Updated: 06/19/22 0639     Creatinine 1.20 mg/dL      eGFR 63.1 mL/min/1.73      Comment: National Kidney Foundation and American Society of Nephrology (ASN) Task Force recommended calculation based on the Chronic Kidney Disease Epidemiology Collaboration (CKD-EPI) equation refit without adjustment for race.       Narrative:      GFR Normal >60  Chronic Kidney Disease <60  Kidney Failure <15      Extra Tubes [[id number]] Collected: 06/19/22  0530    Specimen: Blood, Venous Line Updated: 06/19/22 0630    Narrative:      The following orders were created for panel order Extra Tubes.  Procedure                               Abnormality         Status                     ---------                               -----------         ------                     Gold Top - SST[[id number]]                                   Final result                 Please view results for these tests on the individual orders.    Gold Top - SST [[id number]] Collected: 06/19/22 0530    Specimen: Blood Updated: 06/19/22 0630    Blood Culture - Blood, Wrist, Right [[id number]]  (Normal) Collected: 06/15/22 0401    Specimen: Blood from Wrist, Right Updated: 06/19/22 0417     Blood Culture No growth at 4 days    POC Glucose Once [[id number]]  (Abnormal) Collected: 06/18/22 1609    Specimen: Blood Updated: 06/18/22 1611     Glucose 293 mg/dL      Comment: Serial Number: [id number]:  [id number]              Imaging Results (Last 24 Hours)     ** No results found for the last 24 hours. **               I reviewed the patient's new clinical results.    Medication Review:   Scheduled Meds:apixaban, 5 mg, Oral, Q12H  aspirin, 81 mg, Oral, Daily  atorvastatin, 40 mg, Oral, Nightly  bacitracin, 1 application, Topical, Q12H  budesonide-formoterol, 2 puff, Inhalation, BID - RT  cefuroxime, 500 mg, Oral, Q12H  docusate sodium, 100 mg, Oral, BID  famotidine, 20 mg, Oral, BID AC  furosemide, 40 mg, Oral, QAM  glipizide, 5 mg, Oral, BID AC  guaiFENesin, 600 mg, Oral, Q12H  insulin lispro, 0-24 Units, Subcutaneous, TID AC  insulin regular, 8 Units, Subcutaneous, TID AC  isosorbide mononitrate, 120 mg, Oral, Q24H  metOLazone, 5 mg, Oral, Once per day on Mon Wed Fri  metoprolol tartrate, 25 mg, Oral, Q12H  mirtazapine, 30 mg, Oral, Nightly  [START ON 6/20/2022] predniSONE, 20 mg, Oral, Daily   Followed by  [START ON 6/22/2022] predniSONE, 10 mg, Oral, Daily  pregabalin, 150 mg, Oral,  BID  ranolazine, 500 mg, Oral, BID  rOPINIRole, 0.5 mg, Oral, Nightly  sodium chloride, 10 mL, Intravenous, Q12H  traZODone, 100 mg, Oral, Nightly      Continuous Infusions:   PRN Meds:.•  acetaminophen **OR** acetaminophen **OR** acetaminophen  •  albuterol  •  benzonatate  •  dextrose  •  dextrose  •  glucagon (human recombinant)  •  HYDROmorphone  •  insulin lispro **AND** insulin lispro  •  ipratropium-albuterol  •  ondansetron **OR** ondansetron  •  sodium chloride     Assessment & Plan       Pneumonia of right lung due to infectious organism, unspecified part of lung    Chronic low back pain    Atrial fibrillation (HCC)    Chronic obstructive pulmonary disease (HCC)    COPD with exacerbation (HCC)    CAD (coronary artery disease)    Diabetes mellitus with coincident hypertension (HCC)    (HFpEF) heart failure with preserved ejection fraction (HCC)    Chronic low back pain    Steroid-induced hyperglycemia    Positive blood culture    COPD exacerbation - steroids tapering   Reflux aspiration - off IV abx and on po ceftin per pulmonology - needs 6 min walk at ID.   Ventral hernia  Presence of pacemaker  T2dm with hyperglycemia- continue SSI hoping this improved as steroids deescalate. It appears he is not on insulin at home.   Hyponatremia-improved today     Plan for disposition home with outpt PT - likely tomorrow     Olga Pierce, APRN  06/19/22  13:10 EDT

## 2022-06-20 NOTE — NURSING NOTE
"I placed a call to MD regarding patient's pain medication. Patient has PRN Dilaudid every 2 hours, MD note states that he is a possible discharge on 6/20. I spoke with MD, Dr. Jean, regarding my concern over patient's frequency of Dilaudid requests vs what patient could take at home. Dr. Jean ordered the Dilaudid to be discontinued and an order for oxy to be placed. Patient requested pain medication. I went into patient room and attempted to explain the the doctor ordered oral medication as he could possibly be discharged and couldn't take dilaudid at home. I attempted to explain that this is an attempt to help with pain control on something he can take at home. Patient stated \"I decide when I go home, and I am not taking anything but my dilaudid.\" Patient then stated that he was done with me then requested I leave the room. Will continue to monitor.   "

## 2022-06-20 NOTE — CASE MANAGEMENT/SOCIAL WORK
Continued Stay Note  KATLYN Devlin     Patient Name: Ro Nathan  MRN: [id number]  Today's Date: 6/20/2022    Admit Date: 6/15/2022     Discharge Plan     Row Name 06/20/22 1252       Plan    Plan DC PLAN: Routine home, Current with [facility]. Will need CAITLIN order. Watch for oxygen needs.    Patient/Family in Agreement with Plan yes    Plan Comments Met with patient at bedside, Wants to go home with HHC or maybe outpatient Therapy, Will consider it. Refuses INPT Rehab. Current with [facility], will need CAITLIN orders at time of DC. Also watch for possible home oxygen needs. Will need 6 min walk. DC BARRIERS: IV abx, pulmonary consult.                Ashley Pratt RN     Office phone: [phone]  Cell phone: [phone]

## 2022-06-20 NOTE — NURSING NOTE
This nurse went to speak with pt per pt's request. Pt reporting that he would like his IV Dilaudid, that the oral pain medication does not work. Pt advised that he has transitioned from IV to PO ABT & that his steroids are being tapered in anticipation of his discharge to home. Advised pt that since he will not be receiving IV pain medication at home, that he needs to attempt to try the PO pain medication. Pt reports that it doesn't work, that it is not the same thing he takes at home. This nurse asked pt what he takes at home, advising that maybe the order can be changed to his home medication. Pt became agitated & argumentative, stating that he cannot remember what he takes at home because his wife takes care of all of that for him. Attempted to educate pt on the process of transitioning to home. Pt continued to be argumentative & stated to just have his nurse bring him the pain medication but he knows that it will not work. Will continue to monitor.

## 2022-06-20 NOTE — PLAN OF CARE
Goal Outcome Evaluation:  Patient continuously complaining of pain in the lower back. PO and IV pain medication given PRN. Feeling somewhat short of breath while moving around in the bed. Able to ambulate with min assist to the bathroom. Dressings to bilateral knees changed. No other complaints.  Will continue to monitor.

## 2022-06-20 NOTE — PROGRESS NOTES
Daily Progress Note        Pneumonia of right lung due to infectious organism, unspecified part of lung    Chronic low back pain    Atrial fibrillation (HCC)    Chronic obstructive pulmonary disease (HCC)    COPD with exacerbation (HCC)    CAD (coronary artery disease)    Diabetes mellitus with coincident hypertension (HCC)    (HFpEF) heart failure with preserved ejection fraction (HCC)    Chronic low back pain    Steroid-induced hyperglycemia    Positive blood culture      Assessment    COPD exacerbation     Recurrent right lower lobe pneumonia  -most likely secondary to reflux aspiration which is different than swallowing aspiration     Bronchoscopy completed 4/23/2022  Very thick right lower lobe secretions compatible with gastric material, action therapeutically, findings suggestive of chronic reflux disease which will explain the abnormal CAT scan noted last year. Cultures all negative except fungal positive, which is clinically insignificant.      EMBER October 2021   -suggestive of severe mitral regurgitation with RVSP 40   -Normal EF   Repeat echo 4/22/2022   -EF 40%, RVSP 40 mmHg moderate mitral and tricuspid regurgitation, biatrial enlargement     Obstructive sleep apnea  -on CPAP     4/28/2022 swallow study   - with no signs of aspiration      H/O Nonhealing abd surgical wound staph coccus aureus, MRSA+     H/o Alcoholic cirrhosis  Chronic kidney disease  Hypertension  Peripheral vascular disease  Diabetes mellitus  Former smoker-quit 2020           Plan    CPAP at night and as needed  Patient is improving from pulmonary standpoint   follow-up in our clinic 2 to 3 weeks, patient is feeling generalized weakness and he prefers to stay another day, final discharge plans as per hospitalist     Continue to titrate oxygen- Currently on  RA, does not wear O2 at home, check saturation on room air and with 6-minute walk     Antibiotics: Currently off cefepime and vancomycin and the blood cultures for staph coag  negative is likely contamination and repeat cultures negative, and his white cell count is improving so we was switched to cefuroxime 6/18/2022 for 5 days     tapering dose steroids  Mucinex  Pulmicort, Brovana and DuoNebs  Electrolyte/Glycemic control  DVT/GI Prophylaxis-Eliquis for A. fib and Pepcid  I-S every 4 hours while awake          LOS: 3 days     Subjective         Objective     Vital signs for last 24 hours:  Vitals:    06/19/22 2310 06/19/22 2313 06/20/22 0210 06/20/22 0435   BP:   137/76 155/87   BP Location:   Left arm Left arm   Patient Position:   Lying Sitting   Pulse: 71 74 69 68   Resp: 20 20 18 20   Temp:   97.4 °F (36.3 °C) 97.7 °F (36.5 °C)   TempSrc:   Oral Oral   SpO2: 96% 96% 100% 97%   Weight:       Height:           Intake/Output last 3 shifts:  I/O last 3 completed shifts:  In: 460 [P.O.:460]  Out: 250 [Urine:250]  Intake/Output this shift:  No intake/output data recorded.      Radiology  Imaging Results (Last 24 Hours)     ** No results found for the last 24 hours. **          Labs:  Results from last 7 days   Lab Units 06/20/22  0508   WBC 10*3/mm3 10.80   HEMOGLOBIN g/dL 12.9*   HEMATOCRIT % 38.3   PLATELETS 10*3/mm3 322     Results from last 7 days   Lab Units 06/20/22  0508 06/19/22  0803 06/19/22  0530 06/18/22  0311   SODIUM mmol/L  --  135*  --  129*   POTASSIUM mmol/L  --  4.0  --  4.9   CHLORIDE mmol/L  --  97*  --  97*   CO2 mmol/L  --  28.0  --  23.0   BUN mg/dL  --  30*  --  33*   CREATININE mg/dL 1.04 1.13   < > 1.12   CALCIUM mg/dL  --  8.7  --  8.2*   BILIRUBIN mg/dL  --   --   --  0.3   ALK PHOS U/L  --   --   --  99   ALT (SGPT) U/L  --   --   --  6   AST (SGOT) U/L  --   --   --  12   GLUCOSE mg/dL  --  215*  --  314*    < > = values in this interval not displayed.     Results from last 7 days   Lab Units 06/15/22  0344   PH, ARTERIAL pH units 7.379   PO2 ART mm Hg 86.5   PCO2, ARTERIAL mm Hg 48.6*   HCO3 ART mmol/L 28.7*     Results from last 7 days   Lab Units  06/18/22  0311 06/17/22  0320 06/15/22  2334   ALBUMIN g/dL 3.00* 2.70* 3.10*     Results from last 7 days   Lab Units 06/15/22  0144   TROPONIN T ng/mL <0.010                           Meds:   SCHEDULE  apixaban, 5 mg, Oral, Q12H  aspirin, 81 mg, Oral, Daily  atorvastatin, 40 mg, Oral, Nightly  bacitracin, 1 application, Topical, Q12H  budesonide-formoterol, 2 puff, Inhalation, BID - RT  cefuroxime, 500 mg, Oral, Q12H  docusate sodium, 100 mg, Oral, BID  famotidine, 20 mg, Oral, BID AC  furosemide, 40 mg, Oral, QAM  glipizide, 5 mg, Oral, BID AC  guaiFENesin, 600 mg, Oral, Q12H  insulin lispro, 0-24 Units, Subcutaneous, TID AC  insulin regular, 8 Units, Subcutaneous, TID AC  isosorbide mononitrate, 120 mg, Oral, Q24H  metOLazone, 5 mg, Oral, Once per day on Mon Wed Fri  metoprolol tartrate, 25 mg, Oral, Q12H  mirtazapine, 30 mg, Oral, Nightly  predniSONE, 20 mg, Oral, Daily   Followed by  [START ON 6/22/2022] predniSONE, 10 mg, Oral, Daily  pregabalin, 150 mg, Oral, BID  ranolazine, 500 mg, Oral, BID  rOPINIRole, 0.5 mg, Oral, Nightly  sodium chloride, 10 mL, Intravenous, Q12H  traZODone, 100 mg, Oral, Nightly      Infusions     PRNs  •  acetaminophen **OR** acetaminophen **OR** acetaminophen  •  albuterol  •  benzonatate  •  dextrose  •  dextrose  •  glucagon (human recombinant)  •  insulin lispro **AND** insulin lispro  •  ipratropium-albuterol  •  ondansetron **OR** ondansetron  •  oxyCODONE  •  sodium chloride    Physical Exam:  Physical Exam  Vitals reviewed.   Constitutional:       Appearance: He is obese.   Pulmonary:      Breath sounds: Rhonchi present.   Skin:     General: Skin is warm and dry.   Neurological:      Mental Status: He is alert and oriented to person, place, and time.         ROS  Review of Systems   Respiratory: Positive for cough.        I have reviewed the patient's new clinical results.    Electronically signed by KOURTNEY Yen.

## 2022-06-20 NOTE — PLAN OF CARE
Goal Outcome Evaluation:     Bed mobility - N/A or Not attempted.  Pt sitting at eob with nursing staff  Transfers - SBA and with rolling walker  Ambulation - 125 feet SBA and with rolling walker  Pt gets very short of air with distance gait training    Low Intensity Therapy recommended post-acute care - This is recommended as therapy feels this patient would require 2-3 visits per week. OP PT would be recommended at d/c.  Pt requires no DME at discharge.     Pt desires Home with family assist and Outpatient therapy at discharge. Pt cooperative; agreeable to therapeutic recommendations and plan of care.

## 2022-06-20 NOTE — THERAPY TREATMENT NOTE
Subjective: Pt agreeable to therapeutic plan of care.    Objective:     Bed mobility - N/A or Not attempted.  Pt sitting at eob with nursing staff  Transfers - SBA and with rolling walker  Ambulation - 125 feet SBA and with rolling walker  Pt gets very short of air with distance gait training    Vitals: WNL    Pain: 6 VAS back pain.  Pt getting pain meds at beginning of tx session  Education: Provided education on importance of mobility and skilled verbal / tactile cueing throughout intervention.     Assessment: Ro Nathan presents with functional mobility impairments which indicate the need for skilled intervention. Tolerating session today without incident. Pt with back pain but will to work with PT today.  Will continue to follow and progress as tolerated.     Plan/Recommendations:   Low Intensity Therapy recommended post-acute care - This is recommended as therapy feels this patient would require 2-3 visits per week. OP PT would be recommended at d/c.  Pt requires no DME at discharge.     Pt desires Home with family assist and Outpatient therapy at discharge. Pt cooperative; agreeable to therapeutic recommendations and plan of care.     Post-Tx Position: Supine with HOB Elevated, Alarms activated and Call light and personal items within reach  PPE: gloves, surgical mask, eyewear protection

## 2022-06-20 NOTE — SIGNIFICANT NOTE
Patient voicing complaints of pain. Patient is refusing to take oral medication and requesting only IV pain medication. I spoke with patient again about the possibility of discharge and how the doctor would like him to try oral medication rather than just relying on IV. I explained again that if the oral medication did not work I could call the doctor back but when I spoke with the doctor earlier Dr. Jean stated that he needed to try oral before just relying only on IV as IV medication is not something he can go home with. Patient again stated that he is not going anywhere and not getting discharged. He has now requested to speak to the charge nurse. Charge nurse, Dipika, made aware.

## 2022-06-20 NOTE — PROGRESS NOTES
LOS: 3 days   Patient Care Team:  Yusuf Jones MD as PCP - General  [facility] (Cardiology)  Moise Watson MD as Consulting Physician (Cardiac Electrophysiology)  Izzy Alvarez MD as Consulting Physician (Nephrology)  Celine Swanson MD as Consulting Physician (Cardiology)    Subjective:  Complains of pain    Objective:   Coughing      Review of Systems:   Review of Systems   Constitutional: Positive for activity change.   Respiratory: Positive for cough and shortness of breath.    Cardiovascular: Negative.    Neurological: Positive for weakness.           Vital Signs  Temp:  [97.4 °F (36.3 °C)-97.9 °F (36.6 °C)] 97.8 °F (36.6 °C)  Heart Rate:  [68-86] 72  Resp:  [18-20] 18  BP: (120-155)/(61-87) 144/76    Physical Exam:  Physical Exam  Vitals and nursing note reviewed.   Constitutional:       General: He is not in acute distress.     Appearance: He is obese. He is not ill-appearing.   Cardiovascular:      Rate and Rhythm: Rhythm irregular.      Heart sounds: Normal heart sounds.   Pulmonary:      Breath sounds: Wheezing and rhonchi present.   Skin:     General: Skin is warm.   Neurological:      Mental Status: He is alert.          Radiology:  XR Chest 1 View    Result Date: 6/15/2022  Mild cardiomegaly with no acute findings otherwise seen. Electronically signed by:  Dat Casey D.O.  6/15/2022 1:18 AM    CT Angiogram Chest Pulmonary Embolism    Result Date: 6/15/2022  1. No evidence of pulmonary embolism. 2. No evidence of thoracic aortic aneurysm or dissection. 3. Patchy areas of opacity within the right middle and right lower lobe as above is likely inflammatory or due to an atypical infectious process. 4. Trace right pleural effusion. Electronically signed by:  Dat Casey D.O.  6/15/2022 1:17 AM         Results Review:     I reviewed the patient's new clinical results.  I reviewed the patient's new imaging results and agree with  the interpretation.    Medication Review:   Scheduled Meds:apixaban, 5 mg, Oral, Q12H  aspirin, 81 mg, Oral, Daily  atorvastatin, 40 mg, Oral, Nightly  bacitracin, 1 application, Topical, Q12H  budesonide-formoterol, 2 puff, Inhalation, BID - RT  cefuroxime, 500 mg, Oral, Q12H  docusate sodium, 100 mg, Oral, BID  famotidine, 20 mg, Oral, BID AC  furosemide, 40 mg, Oral, QAM  glipizide, 5 mg, Oral, BID AC  guaiFENesin, 600 mg, Oral, Q12H  insulin lispro, 0-24 Units, Subcutaneous, TID AC  insulin regular, 8 Units, Subcutaneous, TID AC  isosorbide mononitrate, 120 mg, Oral, Q24H  metOLazone, 5 mg, Oral, Once per day on Mon Wed Fri  metoprolol tartrate, 25 mg, Oral, Q12H  mirtazapine, 30 mg, Oral, Nightly  predniSONE, 20 mg, Oral, Daily   Followed by  [START ON 6/22/2022] predniSONE, 10 mg, Oral, Daily  pregabalin, 150 mg, Oral, BID  ranolazine, 500 mg, Oral, BID  rOPINIRole, 0.5 mg, Oral, Nightly  sodium chloride, 10 mL, Intravenous, Q12H  traZODone, 100 mg, Oral, Nightly      Continuous Infusions:   PRN Meds:.•  acetaminophen **OR** acetaminophen **OR** acetaminophen  •  albuterol  •  benzonatate  •  dextrose  •  dextrose  •  glucagon (human recombinant)  •  insulin lispro **AND** insulin lispro  •  ipratropium-albuterol  •  ondansetron **OR** ondansetron  •  oxyCODONE  •  sodium chloride    Labs:    CBC    Results from last 7 days   Lab Units 06/20/22  0508 06/19/22  0803 06/18/22  0311 06/17/22  0320 06/15/22  2334 06/15/22  0144   WBC 10*3/mm3 10.80 11.70* 10.80 17.60* 15.40* 10.10   HEMOGLOBIN g/dL 12.9* 11.7* 10.8* 10.8* 10.6* 12.4*   PLATELETS 10*3/mm3 322 309 260 294 308 318     BMP   Results from last 7 days   Lab Units 06/20/22  0508 06/19/22  0803 06/19/22  0530 06/18/22  0311 06/17/22  0320 06/15/22  2334 06/15/22  0144   SODIUM mmol/L  --  135*  --  129* 131* 131* 135*   POTASSIUM mmol/L  --  4.0  --  4.9 5.0 4.2 4.8   CHLORIDE mmol/L  --  97*  --  97* 97* 95* 96*   CO2 mmol/L  --  28.0  --  23.0 23.0 24.0  27.0   BUN mg/dL  --  30*  --  33* 30* 26* 25*   CREATININE mg/dL 1.04 1.13 1.20 1.12 1.18 1.27 1.27   GLUCOSE mg/dL  --  215*  --  314* 223* 361* 234*     Cr Clearance Estimated Creatinine Clearance: 86.8 mL/min (by C-G formula based on SCr of 1.04 mg/dL).  Coag     HbA1C   Lab Results   Component Value Date    HGBA1C 8.5 (H) 06/02/2022    HGBA1C 8.5 (H) 04/19/2022    HGBA1C 6.7 (H) 09/28/2021     Blood Glucose   Glucose   Date/Time Value Ref Range Status   06/20/2022 0723 153 (H) 70 - 105 mg/dL Final     Comment:     Serial Number: [id number]:  [id number]   06/19/2022 2100 261 (H) 70 - 105 mg/dL Final     Comment:     Serial Number: [id number]:  [id number]   06/19/2022 1600 211 (H) 70 - 105 mg/dL Final     Comment:     Serial Number: [id number]:  [id number]   06/19/2022 1121 242 (H) 70 - 105 mg/dL Final     Comment:     Serial Number: [id number]:  [id number]   06/19/2022 0753 209 (H) 70 - 105 mg/dL Final     Comment:     Serial Number: [id number]:  [id number]   06/19/2022 0727 187 (H) 70 - 105 mg/dL Final     Comment:     Serial Number: [id number]:  [id number]   06/18/2022 1609 293 (H) 70 - 105 mg/dL Final     Comment:     Serial Number: [id number]:  [id number]   06/18/2022 1120 399 (H) 70 - 105 mg/dL Final     Comment:     Serial Number: [id number]:  [id number]     Infection   Results from last 7 days   Lab Units 06/15/22  0401 06/15/22  0207 06/15/22  0144   BLOODCX  No growth at 5 days Staphylococcus, coagulase negative*  --    BCIDPCR   --  Staph spp, not aureus or lugdunesis. Identification by BCID2 PCR.*  --    PROCALCITONIN ng/mL  --   --  0.08     CMP   Results from last 7 days   Lab Units 06/20/22  0508 06/19/22  0803 06/19/22  0530 06/18/22  0311 06/17/22  0320 06/15/22  2334 06/15/22  0144   SODIUM mmol/L  --  135*  --  129* 131* 131* 135*   POTASSIUM mmol/L  --  4.0  --  4.9 5.0 4.2 4.8   CHLORIDE mmol/L  --  97*  --  97* 97* 95* 96*   CO2 mmol/L  --   28.0  --  23.0 23.0 24.0 27.0   BUN mg/dL  --  30*  --  33* 30* 26* 25*   CREATININE mg/dL 1.04 1.13 1.20 1.12 1.18 1.27 1.27   GLUCOSE mg/dL  --  215*  --  314* 223* 361* 234*   ALBUMIN g/dL  --   --   --  3.00* 2.70* 3.10* 3.40*   BILIRUBIN mg/dL  --   --   --  0.3 0.3 0.4 0.4   ALK PHOS U/L  --   --   --  99 88 103 119*   AST (SGOT) U/L  --   --   --  12 11 12 10   ALT (SGPT) U/L  --   --   --  6 5 7 7     UA    Results from last 7 days   Lab Units 06/15/22  0229   NITRITE UA  Negative     Radiology(recent) No radiology results for the last day   Assessment:      Acute exacerbation of panlobular COPD with emphysema  Recurrent community-acquired right lower lobe aspiration pneumonia present upon admission, likely gram-negative species  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  Diabetes mellitus type 2 with renal manifestations  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  Chronic oral anticoagulation  Diabetic dyslipidemia  Chronic kidney disease stage IIIa  History of intermittent bowel obstruction secondary to massive ventral hernia  History of right renal mass  3.4 cm infrarenal abdominal aortic aneurysm  Opioid dependence  Hypertension associated chronic kidney disease stage IIIa  Hypertensive nephropathy and diabetic glomerulosclerosis  Panlobular COPD with emphysema  Monoclonal paraproteinemia  Valvular heart disease with significant mitral regurgitation  Tricuspid regurgitation, mild  Paroxysmal atrial fibrillation  HFrEF with acute exacerbation  Hypercoagulable state secondary to atrial fibrillation as above  RLS  Chronic hypoxic respiratory failure  Supplemental oxygen dependency  Secondary thrombophilia  History of prostate cancer  Atherosclerotic heart disease of native coronary arteries with angina pectoris  Chronic mucopurulent bronchitis  Morbid exogenous obesity  Hypoventilation apnea syndrome with ARTI  Herbert's esophageal  change  Gastroesophageal reflux disease with esophagitis  Liver cirrhosis  History of alcoholism  Nicotine dependency with nicotine use disorder, cigarettes  Peripheral polyneuropathy secondary to alcohol and diabetes  Autonomic neuropathy secondary to diabetes  B12 deficiency  PAD  Vitamin D deficiency  Dependency upon positive pressure ventilation apparatus  History of pacemaker placement  History of percutaneous coronary intervention with stent placement       Plan:  Continue antimicrobial therapy//physical therapy//titration of pain medication        Yusuf Jones MD  06/20/22  07:48 EDT

## 2022-06-21 NOTE — CASE MANAGEMENT/SOCIAL WORK
Continued Stay Note  KATLYN Devlin     Patient Name: Ro Nathan  MRN: [id number]  Today's Date: 6/21/2022    Admit Date: 6/15/2022     Discharge Plan     Row Name 06/21/22 1126       Plan    Plan DC PLAN: Routine home, Current with [facility]. Will need CAITLIN order    Patient/Family in Agreement with Plan yes    Plan Comments Did not qualify for Home oxygen. Current with [facility]. May discharge today but still requiring IV pain medications, will continue to monitor.                      Ashley Pratt RN     Office phone: [phone]  Cell phone: [phone]

## 2022-06-21 NOTE — PLAN OF CARE
Goal Outcome Evaluation:  Plan of Care Reviewed With: patient        Progress: no change  Outcome Evaluation: Patient rested throughout the night, no complaints at this time.

## 2022-06-21 NOTE — PLAN OF CARE
Goal Outcome Evaluation:   Patient had 6 minute walk and will need to go home on 2 L NC. Home with HH. Continuous 9 out of 10 back pain, PRN pain meds given.  VSS, continue to monitor.

## 2022-06-21 NOTE — PROGRESS NOTES
LOS: 6 days   Patient Care Team:  Yusuf Jones MD as PCP - General  [facility]ing Physician (Cardiology)  Shirley, Moise Kathleen MD as Consulting Physician (Cardiac Electrophysiology)  Izzy Alvarez MD as Consulting Physician (Nephrology)  Celine Swanson MD as Consulting Physician (Cardiology)    Subjective:  Concerned about his parenteral pain medications    Objective:   afebrile      Review of Systems:   Review of Systems   Constitutional: Positive for activity change.   Respiratory: Positive for cough and shortness of breath.    Musculoskeletal: Positive for arthralgias and back pain.   Neurological: Positive for weakness.           Vital Signs  Temp:  [97.3 °F (36.3 °C)-97.6 °F (36.4 °C)] 97.6 °F (36.4 °C)  Heart Rate:  [69-84] 73  Resp:  [16-20] 18  BP: (111-145)/(65-78) 123/70    Physical Exam:  Physical Exam  Vitals reviewed.   Constitutional:       Appearance: Normal appearance.   Cardiovascular:      Rate and Rhythm: Normal rate.      Heart sounds: Normal heart sounds.   Skin:     General: Skin is warm.   Neurological:      Mental Status: He is alert.          Radiology:  XR Chest 1 View    Result Date: 6/15/2022  Mild cardiomegaly with no acute findings otherwise seen. Electronically signed by:  Dat Casey D.O.  6/15/2022 1:18 AM    CT Angiogram Chest Pulmonary Embolism    Result Date: 6/15/2022  1. No evidence of pulmonary embolism. 2. No evidence of thoracic aortic aneurysm or dissection. 3. Patchy areas of opacity within the right middle and right lower lobe as above is likely inflammatory or due to an atypical infectious process. 4. Trace right pleural effusion. Electronically signed by:  Dat Casey D.O.  6/15/2022 1:17 AM         Results Review:     I reviewed the patient's new clinical results.  I reviewed the patient's new imaging results and agree with the interpretation.    Medication Review:   Scheduled Meds:apixaban, 5 mg, Oral,  Q12H  aspirin, 81 mg, Oral, Daily  atorvastatin, 40 mg, Oral, Nightly  bacitracin, 1 application, Topical, Q12H  budesonide-formoterol, 2 puff, Inhalation, BID - RT  cefuroxime, 500 mg, Oral, Q12H  docusate sodium, 100 mg, Oral, BID  famotidine, 20 mg, Oral, BID AC  furosemide, 40 mg, Oral, QAM  glipizide, 5 mg, Oral, BID AC  guaiFENesin, 600 mg, Oral, Q12H  insulin lispro, 0-24 Units, Subcutaneous, TID AC  insulin regular, 8 Units, Subcutaneous, TID AC  isosorbide mononitrate, 120 mg, Oral, Q24H  metOLazone, 5 mg, Oral, Once per day on Mon Wed Fri  metoprolol tartrate, 25 mg, Oral, Q12H  mirtazapine, 30 mg, Oral, Nightly  [START ON 6/22/2022] predniSONE, 10 mg, Oral, Daily  pregabalin, 150 mg, Oral, BID  ranolazine, 500 mg, Oral, BID  rOPINIRole, 0.5 mg, Oral, Nightly  sodium chloride, 10 mL, Intravenous, Q12H  traZODone, 100 mg, Oral, Nightly      Continuous Infusions:   PRN Meds:.•  acetaminophen **OR** acetaminophen **OR** acetaminophen  •  albuterol  •  benzonatate  •  dextrose  •  dextrose  •  glucagon (human recombinant)  •  HYDROmorphone  •  insulin lispro **AND** insulin lispro  •  ipratropium-albuterol  •  ondansetron **OR** ondansetron  •  oxyCODONE  •  sodium chloride    Labs:    CBC    Results from last 7 days   Lab Units 06/21/22  0511 06/20/22  0508 06/19/22  0803 06/18/22  0311 06/17/22  0320 06/15/22  2334 06/15/22  0144   WBC 10*3/mm3 10.60 10.80 11.70* 10.80 17.60* 15.40* 10.10   HEMOGLOBIN g/dL 12.3* 12.9* 11.7* 10.8* 10.8* 10.6* 12.4*   PLATELETS 10*3/mm3 278 322 309 260 294 308 318     BMP   Results from last 7 days   Lab Units 06/21/22  0511 06/20/22  0508 06/19/22  0803 06/19/22  0530 06/18/22  0311 06/17/22  0320 06/15/22  2334 06/15/22  0144   SODIUM mmol/L  --   --  135*  --  129* 131* 131* 135*   POTASSIUM mmol/L  --   --  4.0  --  4.9 5.0 4.2 4.8   CHLORIDE mmol/L  --   --  97*  --  97* 97* 95* 96*   CO2 mmol/L  --   --  28.0  --  23.0 23.0 24.0 27.0   BUN mg/dL  --   --  30*  --  33*  30* 26* 25*   CREATININE mg/dL 1.13 1.04 1.13 1.20 1.12 1.18 1.27 1.27   GLUCOSE mg/dL  --   --  215*  --  314* 223* 361* 234*     Cr Clearance Estimated Creatinine Clearance: 79.7 mL/min (by C-G formula based on SCr of 1.13 mg/dL).  Coag     HbA1C   Lab Results   Component Value Date    HGBA1C 8.5 (H) 06/02/2022    HGBA1C 8.5 (H) 04/19/2022    HGBA1C 6.7 (H) 09/28/2021     Blood Glucose   Glucose   Date/Time Value Ref Range Status   06/21/2022 1722 287 (H) 70 - 105 mg/dL Final     Comment:     Serial Number: [id number]:  [id number]   06/21/2022 1130 203 (H) 70 - 105 mg/dL Final     Comment:     Serial Number: [id number]:  [id number]   06/21/2022 0720 169 (H) 70 - 105 mg/dL Final     Comment:     Serial Number: [id number]:  [id number]   06/20/2022 1651 284 (H) 70 - 105 mg/dL Final     Comment:     Serial Number: [id number]:  [id number]   06/20/2022 1101 315 (H) 70 - 105 mg/dL Final     Comment:     Serial Number: [id number]:  [id number]   06/20/2022 0723 153 (H) 70 - 105 mg/dL Final     Comment:     Serial Number: [id number]:  [id number]   06/19/2022 2100 261 (H) 70 - 105 mg/dL Final     Comment:     Serial Number: [id number]:  [id number]   06/19/2022 1600 211 (H) 70 - 105 mg/dL Final     Comment:     Serial Number: [id number]:  [id number]     Infection   Results from last 7 days   Lab Units 06/15/22  0401 06/15/22  0207 06/15/22  0144   BLOODCX  No growth at 5 days Staphylococcus, coagulase negative*  --    BCIDPCR   --  Staph spp, not aureus or lugdunesis. Identification by BCID2 PCR.*  --    PROCALCITONIN ng/mL  --   --  0.08     CMP   Results from last 7 days   Lab Units 06/21/22  0511 06/20/22  0508 06/19/22  0803 06/19/22  0530 06/18/22  0311 06/17/22  0320 06/15/22  2334 06/15/22  0144   SODIUM mmol/L  --   --  135*  --  129* 131* 131* 135*   POTASSIUM mmol/L  --   --  4.0  --  4.9 5.0 4.2 4.8   CHLORIDE mmol/L  --   --  97*  --  97* 97* 95* 96*   CO2 mmol/L  --    --  28.0  --  23.0 23.0 24.0 27.0   BUN mg/dL  --   --  30*  --  33* 30* 26* 25*   CREATININE mg/dL 1.13 1.04 1.13 1.20 1.12 1.18 1.27 1.27   GLUCOSE mg/dL  --   --  215*  --  314* 223* 361* 234*   ALBUMIN g/dL  --   --   --   --  3.00* 2.70* 3.10* 3.40*   BILIRUBIN mg/dL  --   --   --   --  0.3 0.3 0.4 0.4   ALK PHOS U/L  --   --   --   --  99 88 103 119*   AST (SGOT) U/L  --   --   --   --  12 11 12 10   ALT (SGPT) U/L  --   --   --   --  6 5 7 7     UA    Results from last 7 days   Lab Units 06/15/22  0229   NITRITE UA  Negative     Radiology(recent) No radiology results for the last day   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  Recurrent community-acquired right lower lobe aspiration pneumonia present upon admission, likely gram-negative species  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  Diabetes mellitus type 2 with renal manifestations  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  Chronic oral anticoagulation  Diabetic dyslipidemia  Chronic kidney disease stage IIIa  History of intermittent bowel obstruction secondary to massive ventral hernia  History of right renal mass  3.4 cm infrarenal abdominal aortic aneurysm  Opioid dependence  Hypertension associated chronic kidney disease stage IIIa  Hypertensive nephropathy and diabetic glomerulosclerosis  Panlobular COPD with emphysema  Monoclonal paraproteinemia  Valvular heart disease with significant mitral regurgitation  Tricuspid regurgitation, mild  Paroxysmal atrial fibrillation  HFrEF with acute exacerbation  Hypercoagulable state secondary to atrial fibrillation as above  RLS  Chronic hypoxic respiratory failure  Supplemental oxygen dependency  Secondary thrombophilia  History of prostate cancer  Atherosclerotic heart disease of native coronary arteries with angina pectoris  Chronic mucopurulent bronchitis  Morbid exogenous obesity  Hypoventilation apnea  syndrome with ARTI  Herbert's esophageal change  Gastroesophageal reflux disease with esophagitis  Liver cirrhosis  History of alcoholism  Nicotine dependency with nicotine use disorder, cigarettes  Peripheral polyneuropathy secondary to alcohol and diabetes  Autonomic neuropathy secondary to diabetes  B12 deficiency  PAD  Vitamin D deficiency  Dependency upon positive pressure ventilation apparatus  History of pacemaker placement  History of percutaneous coronary intervention with stent placement       Plan:  Cautious use of pain medication given Hx cirrhosis//taper pulmonary therapy        Yusuf Jones MD  06/21/22  19:15 EDT

## 2022-06-21 NOTE — PROGRESS NOTES
Daily Progress Note        Pneumonia of right lung due to infectious organism, unspecified part of lung    Chronic low back pain    Atrial fibrillation (HCC)    Chronic obstructive pulmonary disease (HCC)    COPD with exacerbation (HCC)    CAD (coronary artery disease)    Diabetes mellitus with coincident hypertension (HCC)    (HFpEF) heart failure with preserved ejection fraction (HCC)    Chronic low back pain    Steroid-induced hyperglycemia    Positive blood culture      Assessment    COPD exacerbation     Recurrent right lobe pneumonia  -most likely secondary to reflux aspiration which is different than swallowing aspiration     Bronchoscopy completed 4/23/2022  Very thick right lower lobe secretions compatible with gastric material, action therapeutically, findings suggestive of chronic reflux disease which will explain the abnormal CAT scan noted last year. Cultures all negative except fungal positive, which is clinically insignificant.      EMBER October 2021   -suggestive of severe mitral regurgitation with RVSP 40   -Normal EF   Repeat echo 4/22/2022   -EF 40%, RVSP 40 mmHg moderate mitral and tricuspid regurgitation, biatrial enlargement     Obstructive sleep apnea  -on CPAP     4/28/2022 swallow study   - with no signs of aspiration      H/O Nonhealing abd surgical wound staph coccus aureus, MRSA+     H/o Alcoholic cirrhosis  Chronic kidney disease  Hypertension  Peripheral vascular disease  Diabetes mellitus  Former smoker-quit 2020           Plan    CPAP at night and as needed     Continue to titrate oxygen- Currently on  RA, does not wear O2 at home, check saturation on room air and with 6-minute walk     Antibiotics: Currently off cefepime and vancomycin and the blood cultures for staph coag negative is likely contamination and repeat cultures negative, and his white cell count is improving so we was switched to cefuroxime 6/18/2022 for 5 days     tapering dose steroids  Mucinex  Pulmicort, Brovana and  Graciela  Electrolyte/Glycemic control  DVT/GI Prophylaxis-Eliquis for A. fib and Pepcid  I-S every 4 hours while awake          LOS: 4 days     Subjective         Objective     Vital signs for last 24 hours:  Vitals:    06/20/22 1926 06/20/22 2333 06/21/22 0340 06/21/22 0511   BP: 127/72 111/70 120/74    BP Location: Left arm Left arm Left arm    Patient Position: Lying Lying Lying    Pulse: 69 80 69    Resp: 18 18 18    Temp: 97.3 °F (36.3 °C) 97.4 °F (36.3 °C) 97.5 °F (36.4 °C)    TempSrc: Oral Oral Oral    SpO2: 98% 94% 96%    Weight:    126 kg (276 lb 14.4 oz)   Height:           Intake/Output last 3 shifts:  I/O last 3 completed shifts:  In: 1180 [P.O.:1180]  Out: 250 [Urine:250]  Intake/Output this shift:  No intake/output data recorded.      Radiology  Imaging Results (Last 24 Hours)     ** No results found for the last 24 hours. **          Labs:  Results from last 7 days   Lab Units 06/21/22  0511   WBC 10*3/mm3 10.60   HEMOGLOBIN g/dL 12.3*   HEMATOCRIT % 36.2*   PLATELETS 10*3/mm3 278     Results from last 7 days   Lab Units 06/21/22  0511 06/20/22  0508 06/19/22  0803 06/19/22  0530 06/18/22  0311   SODIUM mmol/L  --   --  135*  --  129*   POTASSIUM mmol/L  --   --  4.0  --  4.9   CHLORIDE mmol/L  --   --  97*  --  97*   CO2 mmol/L  --   --  28.0  --  23.0   BUN mg/dL  --   --  30*  --  33*   CREATININE mg/dL 1.13   < > 1.13   < > 1.12   CALCIUM mg/dL  --   --  8.7  --  8.2*   BILIRUBIN mg/dL  --   --   --   --  0.3   ALK PHOS U/L  --   --   --   --  99   ALT (SGPT) U/L  --   --   --   --  6   AST (SGOT) U/L  --   --   --   --  12   GLUCOSE mg/dL  --   --  215*  --  314*    < > = values in this interval not displayed.     Results from last 7 days   Lab Units 06/15/22  0344   PH, ARTERIAL pH units 7.379   PO2 ART mm Hg 86.5   PCO2, ARTERIAL mm Hg 48.6*   HCO3 ART mmol/L 28.7*     Results from last 7 days   Lab Units 06/18/22  0311 06/17/22  0320 06/15/22  2334   ALBUMIN g/dL 3.00* 2.70* 3.10*     Results  from last 7 days   Lab Units 06/15/22  0144   TROPONIN T ng/mL <0.010                           Meds:   SCHEDULE  apixaban, 5 mg, Oral, Q12H  aspirin, 81 mg, Oral, Daily  atorvastatin, 40 mg, Oral, Nightly  bacitracin, 1 application, Topical, Q12H  budesonide-formoterol, 2 puff, Inhalation, BID - RT  cefuroxime, 500 mg, Oral, Q12H  docusate sodium, 100 mg, Oral, BID  famotidine, 20 mg, Oral, BID AC  furosemide, 40 mg, Oral, QAM  glipizide, 5 mg, Oral, BID AC  guaiFENesin, 600 mg, Oral, Q12H  insulin lispro, 0-24 Units, Subcutaneous, TID AC  insulin regular, 8 Units, Subcutaneous, TID AC  isosorbide mononitrate, 120 mg, Oral, Q24H  metOLazone, 5 mg, Oral, Once per day on Mon Wed Fri  metoprolol tartrate, 25 mg, Oral, Q12H  mirtazapine, 30 mg, Oral, Nightly  predniSONE, 20 mg, Oral, Daily   Followed by  [START ON 6/22/2022] predniSONE, 10 mg, Oral, Daily  pregabalin, 150 mg, Oral, BID  ranolazine, 500 mg, Oral, BID  rOPINIRole, 0.5 mg, Oral, Nightly  sodium chloride, 10 mL, Intravenous, Q12H  traZODone, 100 mg, Oral, Nightly      Infusions     PRNs  •  acetaminophen **OR** acetaminophen **OR** acetaminophen  •  albuterol  •  benzonatate  •  dextrose  •  dextrose  •  glucagon (human recombinant)  •  HYDROmorphone  •  insulin lispro **AND** insulin lispro  •  ipratropium-albuterol  •  ondansetron **OR** ondansetron  •  oxyCODONE  •  sodium chloride    Physical Exam:  Physical Exam  Vitals reviewed.   Constitutional:       Appearance: He is obese.   Pulmonary:      Breath sounds: Rhonchi present.   Skin:     General: Skin is warm and dry.   Neurological:      Mental Status: He is alert and oriented to person, place, and time.         ROS  Review of Systems   Respiratory: Positive for cough.        I have reviewed the patient's new clinical results.    Electronically signed by KOURTNEY Yen.

## 2022-06-21 NOTE — PROGRESS NOTES
Exercise Oximetry    Patient Name:Ro Nathan   MRN: [id number]   Date: 06/21/22             ROOM AIR BASELINE   SpO2% 94% room air   Heart Rate    Blood Pressure      EXERCISE ON ROOM AIR SpO2% EXERCISE ON O2 @ 2 LPM SpO2%   1 MINUTE 93 1 MINUTE    2 MINUTES 91 2 MINUTES    3 MINUTES 86 3 MINUTES 93   4 MINUTES  4 MINUTES 94   5 MINUTES  5 MINUTES 94   6 MINUTES  6 MINUTES 94              Distance Walked   Distance Walked   Dyspnea (Melissa Scale)   Dyspnea (Melissa Scale)   Fatigue (Melissa Scale)   Fatigue (Melissa Scale)   SpO2% Post Exercise   SpO2% Post Exercise 95% 2L NC   HR Post Exercise   HR Post Exercise   Time to Recovery   Time to Recovery     Comments:

## 2022-06-22 PROBLEM — I50.21 ACUTE SYSTOLIC HEART FAILURE (HCC): Status: ACTIVE | Noted: 2022-01-01

## 2022-06-22 NOTE — PROGRESS NOTES
Daily Progress Note        Pneumonia of right lung due to infectious organism, unspecified part of lung    Chronic low back pain    Atrial fibrillation (HCC)    Chronic obstructive pulmonary disease (HCC)    COPD with exacerbation (HCC)    CAD (coronary artery disease)    Diabetes mellitus with coincident hypertension (HCC)    (HFpEF) heart failure with preserved ejection fraction (HCC)    Chronic low back pain    Steroid-induced hyperglycemia    Positive blood culture      Assessment    COPD exacerbation     Recurrent right lobe pneumonia  -most likely secondary to reflux aspiration which is different than swallowing aspiration     Bronchoscopy completed 4/23/2022  Very thick right lower lobe secretions compatible with gastric material, action therapeutically, findings suggestive of chronic reflux disease which will explain the abnormal CAT scan noted last year. Cultures all negative except fungal positive, which is clinically insignificant.      EMBER October 2021   -suggestive of severe mitral regurgitation with RVSP 40   -Normal EF   Repeat echo 4/22/2022   -EF 40%, RVSP 40 mmHg moderate mitral and tricuspid regurgitation, biatrial enlargement     Obstructive sleep apnea  -on CPAP     4/28/2022 swallow study   - with no signs of aspiration      H/O Nonhealing abd surgical wound staph coccus aureus, MRSA+     H/o Alcoholic cirrhosis  Chronic kidney disease  Hypertension  Peripheral vascular disease  Diabetes mellitus  Former smoker-quit 2020           Plan    CPAP at night and as needed     Continue to titrate oxygen- Currently on  RA, does not wear O2 at home, check saturation on room air and with 6-minute walk     Antibiotics: Currently off cefepime and vancomycin and the blood cultures for staph coag negative is likely contamination and repeat cultures negative, and his white cell count is improving so we was switched to cefuroxime 6/18/2022 for 5 days     tapering dose steroids  Mucinex  Pulmicort, Brovana and  Graciela  Electrolyte/Glycemic control  DVT/GI Prophylaxis-Eliquis for A. fib and Pepcid  I-S every 4 hours while awake          LOS: 7 days     Subjective         Objective     Vital signs for last 24 hours:  Vitals:    06/21/22 1824 06/21/22 1926 06/22/22 0040 06/22/22 0306   BP:  116/51 126/62 128/78   BP Location:  Left arm Left arm Left arm   Patient Position:  Lying Lying Sitting   Pulse: 73 72 71 71   Resp: 18 18 18 18   Temp:  98.1 °F (36.7 °C) 97.7 °F (36.5 °C) 97.8 °F (36.6 °C)   TempSrc:  Oral Oral Oral   SpO2: 99% 94% 96% 96%   Weight:       Height:           Intake/Output last 3 shifts:  I/O last 3 completed shifts:  In: 1280 [P.O.:1280]  Out: -   Intake/Output this shift:  I/O this shift:  In: 480 [P.O.:480]  Out: -       Radiology  Imaging Results (Last 24 Hours)     ** No results found for the last 24 hours. **          Labs:  Results from last 7 days   Lab Units 06/22/22  0129   WBC 10*3/mm3 12.30*   HEMOGLOBIN g/dL 12.1*   HEMATOCRIT % 36.6*   PLATELETS 10*3/mm3 317     Results from last 7 days   Lab Units 06/22/22  0129 06/20/22  0508 06/19/22  0803 06/19/22  0530 06/18/22  0311   SODIUM mmol/L  --   --  135*  --  129*   POTASSIUM mmol/L  --   --  4.0  --  4.9   CHLORIDE mmol/L  --   --  97*  --  97*   CO2 mmol/L  --   --  28.0  --  23.0   BUN mg/dL  --   --  30*  --  33*   CREATININE mg/dL 1.18   < > 1.13   < > 1.12   CALCIUM mg/dL  --   --  8.7  --  8.2*   BILIRUBIN mg/dL  --   --   --   --  0.3   ALK PHOS U/L  --   --   --   --  99   ALT (SGPT) U/L  --   --   --   --  6   AST (SGOT) U/L  --   --   --   --  12   GLUCOSE mg/dL  --   --  215*  --  314*    < > = values in this interval not displayed.         Results from last 7 days   Lab Units 06/18/22  0311 06/17/22  0320 06/15/22  2334   ALBUMIN g/dL 3.00* 2.70* 3.10*                               Meds:   SCHEDULE  apixaban, 5 mg, Oral, Q12H  aspirin, 81 mg, Oral, Daily  atorvastatin, 40 mg, Oral, Nightly  bacitracin, 1 application, Topical,  Q12H  budesonide-formoterol, 2 puff, Inhalation, BID - RT  cefuroxime, 500 mg, Oral, Q12H  docusate sodium, 100 mg, Oral, BID  famotidine, 20 mg, Oral, BID AC  furosemide, 40 mg, Oral, QAM  glipizide, 5 mg, Oral, BID AC  guaiFENesin, 600 mg, Oral, Q12H  insulin lispro, 0-24 Units, Subcutaneous, TID AC  insulin regular, 8 Units, Subcutaneous, TID AC  isosorbide mononitrate, 120 mg, Oral, Q24H  metOLazone, 5 mg, Oral, Once per day on Mon Wed Fri  metoprolol tartrate, 25 mg, Oral, Q12H  mirtazapine, 30 mg, Oral, Nightly  predniSONE, 10 mg, Oral, Daily  pregabalin, 150 mg, Oral, BID  ranolazine, 500 mg, Oral, BID  rOPINIRole, 0.5 mg, Oral, Nightly  sodium chloride, 10 mL, Intravenous, Q12H  traZODone, 100 mg, Oral, Nightly      Infusions     PRNs  •  acetaminophen **OR** acetaminophen **OR** acetaminophen  •  albuterol  •  benzonatate  •  dextrose  •  dextrose  •  glucagon (human recombinant)  •  HYDROmorphone  •  insulin lispro **AND** insulin lispro  •  ipratropium-albuterol  •  ondansetron **OR** ondansetron  •  oxyCODONE  •  sodium chloride    Physical Exam:  Physical Exam  Vitals reviewed.   Constitutional:       Appearance: He is obese.   Pulmonary:      Breath sounds: Rhonchi present.   Skin:     General: Skin is warm and dry.   Neurological:      Mental Status: He is alert and oriented to person, place, and time.         ROS  Review of Systems   Respiratory: Positive for cough.        I have reviewed the patient's new clinical results.    Electronically signed by KOURTNEY Yen.

## 2022-06-22 NOTE — DISCHARGE SUMMARY
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  Diabetes mellitus type 2 with renal manifestations  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  Chronic oral anticoagulation  Diabetic dyslipidemia  Chronic kidney disease stage IIIa  History of intermittent bowel obstruction secondary to massive ventral hernia  History of right renal mass  3.4 cm infrarenal abdominal aortic aneurysm  Opioid dependence  Hypertension associated chronic kidney disease stage IIIa  Hypertensive nephropathy and diabetic glomerulosclerosis  Panlobular COPD with emphysema  Monoclonal paraproteinemia  Valvular heart disease with significant mitral regurgitation  Tricuspid regurgitation, mild  Paroxysmal atrial fibrillation  HFrEF with acute exacerbation  Hypercoagulable state secondary to atrial fibrillation as above  RLS  Chronic hypoxic respiratory failure  Supplemental oxygen dependency  Secondary thrombophilia  History of prostate cancer  Atherosclerotic heart disease of native coronary arteries with angina pectoris  Chronic mucopurulent bronchitis  Morbid exogenous obesity  Hypoventilation apnea syndrome with ARTI  Herbert's esophageal change  Gastroesophageal reflux disease with esophagitis  Liver cirrhosis  History of alcoholism  Nicotine dependency with nicotine use disorder, cigarettes  Peripheral polyneuropathy secondary to alcohol and diabetes  Autonomic neuropathy secondary to diabetes  B12 deficiency  PAD  Vitamin D deficiency  Dependency upon positive pressure ventilation apparatus  History of pacemaker placement  History of percutaneous coronary intervention with stent placement    Presenting Problem/History of Present Illness  Active  Hospital Problems    Diagnosis  POA   • **Pneumonia of right lung due to infectious organism, unspecified part of lung [J18.9]  Yes   • Acute systolic heart failure (CMS/HCC) [I50.21]  Unknown   • Steroid-induced hyperglycemia [R73.9, T38.0X5A]  No   • Positive blood culture [R78.81]  Yes   • (HFpEF) heart failure with preserved ejection fraction (HCC) [I50.30]  Yes   • Chronic low back pain [M54.50, G89.29]  Yes   • Diabetes mellitus with coincident hypertension (HCC) [E11.9, I10]  Yes   • COPD with exacerbation (HCC) [J44.1]  Yes   • Chronic obstructive pulmonary disease (HCC) [J44.9]  Yes   • CAD (coronary artery disease) [I25.10]  Yes   • Chronic low back pain [M54.50, G89.29]  Yes   • Atrial fibrillation (HCC) [I48.91]  Yes      Resolved Hospital Problems   No resolved problems to display.          Hospital Course  Patient is a 76 y.o. male who presented with shortness of breath with identification of a community-acquired pneumonia present upon admission likely aspiration variant in an acute exacerbation of panlobular COPD with emphysema.  The patient was admitted and parenteral antimicrobial therapy as well as aggressive pulmonary toilet were initiated.  The patient was seen by pulmonology and did respond to treatments.  He improved overall and did tolerate a steroid wean and was deemed stable for discharge home today to complete an outpatient course of oral antibiotics and oral steroids.  He will follow-up with us in the office for acute video visit within 1 week's time and we will arrange outpatient follow-up with pulmonology within 1 month's time.  He will be sent home with aspiration precautions and will continue with his PPI therapy given in large part secondary to his chronic esophagitis with known history of Hrebert's esophageal change.  He will continue with efforts to lose weight diet and exercise and increase physical activity.  He will call with any questions or concerns..      Procedures  Performed         Consults:   Consults     Date and Time Order Name Status Description    6/15/2022  7:49 AM Inpatient Pulmonology Consult Completed     6/15/2022  4:06 AM IP Consult to Family Practice            Pertinent Test Results:XR Chest 1 View    Result Date: 6/15/2022  Mild cardiomegaly with no acute findings otherwise seen. Electronically signed by:  Dat Casey D.O.  6/15/2022 1:18 AM    CT Angiogram Chest Pulmonary Embolism    Result Date: 6/15/2022  1. No evidence of pulmonary embolism. 2. No evidence of thoracic aortic aneurysm or dissection. 3. Patchy areas of opacity within the right middle and right lower lobe as above is likely inflammatory or due to an atypical infectious process. 4. Trace right pleural effusion. Electronically signed by:  Dat Casey D.O.  6/15/2022 1:17 AM      Imaging Results (Last 7 Days)     ** No results found for the last 168 hours. **              Condition on Discharge:  Fair    Vital Signs  Temp:  [97.3 °F (36.3 °C)-98.1 °F (36.7 °C)] 97.3 °F (36.3 °C)  Heart Rate:  [68-84] 68  Resp:  [16-20] 18  BP: (105-128)/(51-78) 105/65    Physical Exam:     General Appearance:    Alert, cooperative, in no acute distress   Head:    Normocephalic, without obvious abnormality, atraumatic   Eyes:           Conjunctivae and sclerae normal, no   icterus, no pallor, corneas clear, PERRLA   Throat:   No oral lesions, no thrush, oral mucosa moist   Neck:   No adenopathy, supple, trachea midline, no thyromegaly, no   carotid bruit, no JVD   Lungs:     Clear to auscultation,respirations regular, even and                  unlabored    Heart:    Regular rhythm and normal rate, normal S1 and S2, no            murmur, no gallop, no rub, no click   Chest Wall:    No abnormalities observed   Abdomen:     Normal bowel sounds, no masses, no organomegaly, soft        non-tender, non-distended, no guarding, no rebound                tenderness   Rectal:     Deferred   Extremities:   Moves all  extremities well, no edema, no cyanosis, no             redness   Pulses:   Pulses palpable and equal bilaterally   Skin:   No bleeding, bruising or rash   Lymph nodes:   No palpable adenopathy   Neurologic:   Cranial nerves 2 - 12 grossly intact, sensation intact, DTR       present and equal bilaterally         Discharge Disposition  Home or Self Care    Discharge Medications     Discharge Medications      New Medications      Instructions Start Date   cefuroxime 500 MG tablet  Commonly known as: CEFTIN   500 mg, Oral, Every 12 Hours Scheduled      lisinopril 2.5 MG tablet  Commonly known as: Zestril   2.5 mg, Oral, Daily      predniSONE 10 MG tablet  Commonly known as: DELTASONE   10 mg, Oral, Daily   Start Date: June 23, 2022        Continue These Medications      Instructions Start Date   apixaban 5 MG tablet tablet  Commonly known as: ELIQUIS   5 mg, Oral, 2 Times Daily      aspirin 81 MG EC tablet   81 mg, Oral, Daily      atorvastatin 40 MG tablet  Commonly known as: LIPITOR   40 mg, Oral, Every Morning      benzonatate 100 MG capsule  Commonly known as: TESSALON   100 mg, Oral, 3 Times Daily PRN      budesonide 0.5 MG/2ML nebulizer solution  Commonly known as: PULMICORT   0.5 mg, Nebulization, 2 Times Daily PRN      docusate sodium 100 MG capsule  Commonly known as: COLACE   200 mg, Oral, 2 Times Daily      famotidine 20 MG tablet  Commonly known as: PEPCID   20 mg, Oral, 2 Times Daily Before Meals      furosemide 40 MG tablet  Commonly known as: LASIX   40 mg, Oral, Every Morning      glimepiride 2 MG tablet  Commonly known as: AMARYL   2 mg, Oral, Every Morning Before Breakfast      ipratropium-albuterol 0.5-2.5 mg/3 ml nebulizer  Commonly known as: DUO-NEB   3 mL, Nebulization, 3 Times Daily - RT      isosorbide mononitrate 120 MG 24 hr tablet  Commonly known as: IMDUR   120 mg, Oral, Every 24 Hours Scheduled      Lyrica 150 MG capsule  Generic drug: pregabalin   150 mg, Oral, 2 Times Daily       metOLazone 5 MG tablet  Commonly known as: ZAROXOLYN   5 mg, Oral, 3 Times Weekly, Monday, Wednesday, Friday      metoprolol tartrate 25 MG tablet  Commonly known as: LOPRESSOR   25 mg, Oral, 2 Times Daily      mirtazapine 30 MG tablet  Commonly known as: REMERON   30 mg, Oral, Nightly      nitroglycerin 0.4 MG SL tablet  Commonly known as: NITROSTAT   0.4 mg, Sublingual, Every 5 Minutes PRN      oxyCODONE-acetaminophen  MG per tablet  Commonly known as: Percocet   1 tablet, Oral, Every 6 Hours PRN      ranolazine 500 MG 12 hr tablet  Commonly known as: RANEXA   500 mg, Oral, 2 Times Daily      rOPINIRole 0.5 MG tablet  Commonly known as: REQUIP   0.5 mg, Oral, Nightly, Take 1 hour before bedtime.      traZODone 100 MG tablet  Commonly known as: DESYREL   100 mg, Oral, Nightly      Ventolin  (90 Base) MCG/ACT inhaler  Generic drug: albuterol sulfate HFA   2 puffs, Inhalation, Every 6 Hours PRN      vitamin D 1.25 MG (07725 UT) capsule capsule  Commonly known as: ERGOCALCIFEROL   50,000 Units, Oral, Weekly, Friday             Discharge Diet:     Activity at Discharge:     Follow-up Appointments  Future Appointments   Date Time Provider Department Center   7/15/2022  9:00 AM Herberth Oconnor MD [facility]   8/30/2022 11:00 AM [facility]ICE [facility]   8/30/2022 11:40 AM Celine Swanson MD [facility]     Additional Instructions for the Follow-ups that You Need to Schedule     Ambulatory Referral to Home Health (Hospital)   As directed      Face to Face Visit Date: 6/18/2022    Follow-up provider for Plan of Care?: I treated the patient in an acute care facility and will not continue treatment after discharge.    Follow-up provider: DONALDO CALLES [[id number]]    Reason/Clinical Findings: weakness    Describe mobility limitations that make leaving home difficult: weakness    Nursing/Therapeutic Services Requested: Physical Therapy Occupational Therapy     Frequency: 1 Week 1               Test Results Pending at Discharge       Yusuf Jones MD  06/22/22  08:37 EDT

## 2022-06-22 NOTE — NURSING NOTE
Aide found a pill on patient's floor at his feet.  Pt told aid I don't know what that pill is but just throw it away.  Aide brought pill to nurse and it was noted to be percocet 10/325 mg.  Pt was asking for IV medication and this nurse inquired about what was in his duffle bag that he had clutched in his lap when entering pt's room.  Pt showed the nurse everything that was in his bag.  OBC was place and returned by Julissa Gunn NP and informed her of situation.  NP was provided pt's pain medications receiving while here in the hospital.  No n.o. at this time and per NP will inform Dr Jones of issue overnight.

## 2022-06-22 NOTE — DISCHARGE PLACEMENT REQUEST
"Jac Ro CAMPOS (76 y.o. Male)             Date of Birth   1945    Social Security Number       Address   [address] [place] [address] [place] [facility]    Home Phone   [phone]    MRN   [id number]       [employer]   V[employer]oo    Marital Status                               Admission Date   6/15/22    Admission Type   Emergency    Admitting Provider   Yusuf Jones MD    Attending Provider   Yusuf Jones MD    Department, Room/Bed   [facility] 3A MEDICAL INPATIENT, 300/1       Discharge Date       Discharge Disposition   Home or Self Care    Discharge Destination                               Attending Provider: Yusuf Jones MD    Allergies: Haloperidol, Morphine, Pantoprazole, Latex    Isolation: Contact   Infection: MRSA No Isolation this Admit (04/19/22), MRSA (06/15/22)   Code Status: CPR   Advance Care Planning Activity    Ht: 188 cm (74\")   Wt: 126 kg (276 lb 14.4 oz)    Admission Cmt: None   Principal Problem: Pneumonia of right lung due to infectious organism, unspecified part of lung [J18.9]                 Active Insurance as of 6/15/2022     Primary Coverage     Payor Plan Insurance Group Employer/Plan Group    HUMANA MEDICARE REPLACEMENT HUMANA MEDICARE REPLACEMENT [id number]     Payor Plan Address Payor Plan Phone Number Payor Plan Fax Number Effective Dates    [address] [address] [phone]  1/1/2018 - None Entered    [facility] [address]       Subscriber Name Subscriber Birth Date Member ID       RO SIDHU BETH 1945 [id number]                 Emergency Contacts      (Rel.) Home Phone Work Phone Mobile Phone    LONI SIDHU (Spouse) [phone] -- [phone]              "

## 2022-06-22 NOTE — CASE MANAGEMENT/SOCIAL WORK
Continued Stay Note  KATLYN Devlin     Patient Name: Ro Nathan  MRN: [id number]  Today's Date: 6/22/2022    Admit Date: 6/15/2022     Discharge Plan     Row Name 06/22/22 1151       Plan    Plan Home with [facility] - current with and order placed.  New O2 need - placed with [facility]. Outpt referral with [facility] PT on [facility].    Plan Comments Spoke with Dr. Jones via phone regarding need of home O2 - received verbal order.  MD will get [facility] HH order arranged.  Met with patient in room.  Reviewed/discussed IMM, pt denies need of a copy.  Pt desires oupt PT at [facility] on [facility]. - referral placed in Epic.                    Expected Discharge Date and Time     Expected Discharge Date Expected Discharge Time    Jun 22, 2022             Niki Barry RN

## 2022-06-22 NOTE — PLAN OF CARE
Goal Outcome Evaluation:              Outcome Evaluation: Patient resting throughout shift.  Pt dcp is home w/[facility].

## 2022-06-22 NOTE — PLAN OF CARE
Goal Outcome Evaluation:      Patient going home with 2 L oxygen from [facility]. Outpatient rehab will be calling patient to set up if possible. Pt's wife will be picking pt up to discharge home. IV removed, belongings collected and discharge instructions understood.

## 2022-06-22 NOTE — DISCHARGE PLACEMENT REQUEST
"Jac Ro CAMPOS (76 y.o. Male)             Date of Birth   1945    Social Security Number       Address   [address] [place] [address] [place] [facility]    Home Phone   [phone]    MRN   [id number]       Pentecostal   Hindu    Marital Status                               Admission Date   6/15/22    Admission Type   Emergency    Admitting Provider   Yusuf Jones MD    Attending Provider   Yusuf Jones MD    Department, Room/Bed   [facility] 3A MEDICAL INPATIENT, 300/1       Discharge Date       Discharge Disposition   Home or Self Care    Discharge Destination                               Attending Provider: Yusuf Jones MD    Allergies: Haloperidol, Morphine, Pantoprazole, Latex    Isolation: Contact   Infection: MRSA No Isolation this Admit (04/19/22), MRSA (06/15/22)   Code Status: CPR   Advance Care Planning Activity    Ht: 188 cm (74\")   Wt: 126 kg (276 lb 14.4 oz)    Admission Cmt: None   Principal Problem: Pneumonia of right lung due to infectious organism, unspecified part of lung [J18.9]                 Active Insurance as of 6/15/2022     Primary Coverage     Payor Plan Insurance Group Employer/Plan Group    HUMANA MEDICARE REPLACEMENT HUMANA MEDICARE REPLACEMENT [id number]     Payor Plan Address Payor Plan Phone Number Payor Plan Fax Number Effective Dates    [address] [address] [phone]  1/1/2018 - None Entered    [facility] [id number]       Subscriber Name Subscriber Birth Date Member ID       RO SIDHU BETH 1945 [id number]                 Emergency Contacts      (Rel.) Home Phone Work Phone Mobile Phone    LONI SIDHU (Spouse) [phone] -- [phone]              "

## 2022-06-23 NOTE — OUTREACH NOTE
Prep Survey    Flowsheet Row Responses   [facility] facility patient discharged from? [facility]   Is LACE score < 7 ? No   Emergency Room discharge w/ pulse ox? No   Eligibility Readm Mgmt   Discharge diagnosis Pneumonia of right lung due to infectious organism, unspecified part of lung   Does the patient have one of the following disease processes/diagnoses(primary or secondary)? COPD/Pneumonia   Does the patient have Home health ordered? Yes   What is the Home health agency?  [facility]   Is there a DME ordered? Yes   What DME was ordered? Oxygen   Prep survey completed? Yes          GONZALO RIOS - Registered Nurse

## 2022-06-23 NOTE — CASE MANAGEMENT/SOCIAL WORK
Continued Stay Note  KATLYN Devlin     Patient Name: Ro Nathan  MRN: [id number]  Today's Date: 6/23/2022    Admit Date: 6/15/2022     Discharge Plan     Row Name 06/23/22 0811       Plan    Plan Comments Received message from [facility] JAY delcid inquiring about referral to outpatient therapy yesterday.  Patient said his outpatient therapy will not start until next month, and home health services will be over by then.  Verified with liawyatt at [facility].                    Expected Discharge Date and Time     Expected Discharge Date Expected Discharge Time    Jun 22, 2022             Niki Barry RN

## 2022-06-23 NOTE — CASE MANAGEMENT/SOCIAL WORK
Case Management Discharge Note      Final Note: d/c home with [facility]    Provided Post Acute Provider List?: Yes  Post Acute Provider List: DME Supplier, Inpatient Rehab, Nursing Home  Provided Post Acute Provider Quality & Resource List?: Yes  Post Acute Provider Quality and Resource List: Inpatient Rehab, Nursing Home  Delivered To: Patient  Method of Delivery: In person    Selected Continued Care - Discharged on 6/22/2022 Admission date: 6/15/2022 - Discharge disposition: Home or Self Care        Home Medical Care     Service Provider Selected Services Address Phone Fax Patient Preferred    [facility]  [facility] Services [address] [address] [phone] --                Selected Continued Care - Prior Encounters Includes selections from prior encounters from 3/17/2022 to 6/22/2022    Discharged on 6/6/2022 Admission date: 6/2/2022 - Discharge disposition: Home or Self Care    Home Medical Care     Service Provider Selected Services Address Phone Fax Patient Preferred    [facility]  [facility] [address] [address] [phone] --                Discharged on 4/27/2022 Admission date: 4/19/2022 - Discharge disposition: Home-Health Care [facility]    Home Medical Care     Service Provider Selected Services Address Phone Fax Patient Preferred    [facility]  [facility] Services [address] [address] [phone] --                    Transportation Services  Private: Car    Final Discharge Disposition Code: 06 - home with home health care

## 2022-06-24 NOTE — OUTREACH NOTE
COPD/PN Week 1 Survey    Flowsheet Row Responses   [facility] facility patient discharged from? [facility]   Does the patient have one of the following disease processes/diagnoses(primary or secondary)? COPD/Pneumonia   Was the primary reason for admission: COPD exacerbation   Week 1 attempt successful? No   Unsuccessful attempts Attempt 1          VIANCA CAMPOS - Registered Nurse

## 2022-06-29 NOTE — OUTREACH NOTE
COPD/PN Week 2 Survey    Flowsheet Row Responses   [facility] facility patient discharged from? [facility]   Does the patient have one of the following disease processes/diagnoses(primary or secondary)? COPD/Pneumonia   Was the primary reason for admission: COPD exacerbation   Week 2 attempt successful? No   Unsuccessful attempts Attempt 1          KITA PAGE - Licensed Nurse

## 2022-07-05 PROBLEM — I20.0 UNSTABLE ANGINA: Status: ACTIVE | Noted: 2022-01-01

## 2022-07-05 NOTE — H&P
Patient Care Team:  Yusuf Jones MD as PCP - General  [facility] as Consulting Physician (Cardiology)  Shirley, Moise Kathleen MD as Consulting Physician (Cardiac Electrophysiology)  Izzy Alvarez MD as Consulting Physician (Nephrology)  Celine Swanson MD as Consulting Physician (Cardiology)    Chief complaint Chest pain    Subjective     Patient is a 76 y.o. male who presents with complex medical history hypertension with CKD, CHF, chronic low back pain, pacemaker, peripheral vascular disease secondary to diabetes, chronic kidney disease stage III, sleep apnea with CPAP, type 2 diabetes, alcoholic cirrhosis of the liver.  Patient presented to the emergency department on 7/5/22 with complaints of chest pain for 2 days for which he took nearly 17 sublingual nitro that did help with the pain but continued.  He also stated that he has noticed that he had some black stools without erick bleeding.  In the ED he was seen anemic with last hemoglobin in June 12.1 and today's was 7.4.  Be admitted for further evaluation and treatment to include consult to GI and cardiology.    Review of Systems   Constitutional: Positive for activity change and fatigue. Negative for appetite change, diaphoresis and fever.   HENT: Negative.  Negative for trouble swallowing.    Respiratory: Positive for shortness of breath. Negative for cough and chest tightness.    Cardiovascular: Positive for chest pain and leg swelling.   Gastrointestinal: Positive for nausea. Negative for abdominal pain, constipation, diarrhea and vomiting.   Genitourinary: Negative.    Musculoskeletal: Positive for back pain.   Neurological: Positive for weakness.   Psychiatric/Behavioral: Negative.           History  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) 10/26/2020   • Benign hypertension with CKD (chronic kidney disease) stage III (HCC) 10/26/2020   • Bruises easily    • CHF  (congestive heart failure) ([facility])    • Chronic bronchitis with COPD (chronic obstructive pulmonary disease) ([facility]) 10/26/2020   • Chronic respiratory failure with hypoxia ([facility]) 10/26/2020   • Congenital heart defect    • Difficulty attaining erection    • Heart block    • Hernia of abdominal wall    • Hypertension    • Low back pain    • Pacemaker    • Peripheral vascular disease due to secondary diabetes ([facility]) 10/26/2020   • Poor circulation    • Prostate cancer ([facility])     prostate   • Shortness of breath    • Sleep apnea     using CPAP    • Stage 3a chronic kidney disease ([facility]) 10/26/2020   • Stented coronary artery 12/05/2019    MICHAEL to LAD   • Type 2 diabetes, controlled, with neuropathy ([facility]) 10/26/2020    no meds   • Type 2 diabetes, controlled, with neuropathy ([facility]) 10/26/2020     Past Surgical History:   Procedure Laterality Date   • ABDOMINAL SURGERY     • APPENDECTOMY     • BONE CURETTAGE Right 1/22/2021    Procedure: Wound excision with fifth metatarsal head resection, right foot.;  Surgeon: Josef Egan DPM;  Location: [facility] MAIN OR;  Service: Podiatry;  Laterality: Right;   • BONE INCISION AND DRAINAGE Right 10/5/2020    Procedure: Incision and drainage with debridement of all nonviable soft tissue and bone with bone biopsy, right foot.;  Surgeon: Josef Egan DPM;  Location: [facility] MAIN OR;  Service: Podiatry;  Laterality: Right;   • BRONCHOSCOPY N/A 4/23/2022    Procedure: BRONCHOSCOPY with bronchioalveolar lavage of right lower lobe;  Surgeon: Edwin Kline MD;  Location: [facility] ENDOSCOPY;  Service: Pulmonary;  Laterality: N/A;  pneumonia   • CARDIAC CATHETERIZATION N/A 12/5/2019    Procedure: Left Heart Cath;  Surgeon: Mirza Munson MD;  Location: [facility] CATH INVASIVE LOCATION;  Service: Cardiology   • CARDIAC CATHETERIZATION N/A 12/5/2019    Procedure: Stent MICHAEL coronary;  Surgeon: Mirza Munson MD;  Location: [facility] CATH INVASIVE LOCATION;  Service: Cardiology   •  CARDIAC CATHETERIZATION N/A 10/18/2021    Procedure: Left Heart Cath and coronary angiogram;  Surgeon: Celine Swanson MD;  Location: [facility] CATH INVASIVE LOCATION;  Service: Cardiovascular;  Laterality: N/A;   • CARDIAC CATHETERIZATION N/A 10/18/2021    Procedure: Right Heart Cath;  Surgeon: Celine Swanson MD;  Location: [facility] CATH INVASIVE LOCATION;  Service: Cardiovascular;  Laterality: N/A;   • CHOLECYSTECTOMY     • ELBOW PROCEDURE      left   • FOOT SURGERY      right foot   • HERNIA REPAIR     • INCISION AND DRAINAGE OF WOUND Right 4/6/2021    Procedure: INCISION AND DRAINAGE OF RIGHT FOOT 5TH METATARSAL TO BONE AND PARTIAL 5TH METATARSAL RESECTION;  Surgeon: Josef Egan DPM;  Location: [facility] MAIN OR;  Service: Podiatry;  Laterality: Right;   • INCISION AND DRAINAGE OF WOUND Right 4/8/2021    Procedure: INCISION AND DRAINAGE BONE, DELAYED PRIMARY CLOSURE;  Surgeon: Josef Egan DPM;  Location: [facility] MAIN OR;  Service: Podiatry;  Laterality: Right;   • INTERVENTIONAL RADIOLOGY PROCEDURE N/A 12/5/2019    Procedure: Intravascular Ultrasound;  Surgeon: Mirza Munson MD;  Location: [facility] CATH INVASIVE LOCATION;  Service: Cardiology   • PACEMAKER IMPLANTATION     • HI RT/LT HEART CATHETERS N/A 12/5/2019    Procedure: Percutaneous Coronary Intervention;  Surgeon: Mirza Munson MD;  Location: [facility] CATH INVASIVE LOCATION;  Service: Cardiology   • WOUND DEBRIDEMENT Right 10/29/2020    Procedure: Preparation and debridement of foot wound with application of Integra wound graft, right foot.;  Surgeon: Josef Egan DPM;  Location: [facility] MAIN OR;  Service: Podiatry;  Laterality: Right;   • WOUND DEBRIDEMENT N/A 7/29/2021    Procedure: EXCISIONAL ABDOMINAL WOUND  DEBRIDEMENT;  Surgeon: Cleopatra Wang MD;  Location: [facility] MAIN OR;  Service: General;  Laterality: N/A;   • WOUND DEBRIDEMENT N/A 8/1/2021    Procedure: DEBRIDEMENT OF ABDOMINAL WALL;  Surgeon: Hill Bhatia  D, DO;  Location: [facility] MAIN OR;  Service: General;  Laterality: N/A;     Family History   Problem Relation Age of Onset   • Alzheimer's disease Mother    • GI problems Father    • Heart disease Father      Social History     Tobacco Use   • Smoking status: Former Smoker     Years: 15.00     Types: Cigarettes     Start date: 1966     Quit date: 5/10/2020     Years since quittin.1   • Smokeless tobacco: Never Used   Vaping Use   • Vaping Use: Never used   Substance Use Topics   • Alcohol use: Yes     Alcohol/week: 4.0 standard drinks     Types: 4 Shots of liquor per week     Comment: occasionally   • Drug use: No     (Not in a hospital admission)    Allergies:  Haloperidol, Morphine, Pantoprazole, and Latex    Objective     Vital Signs  Temp:  [97.8 °F (36.6 °C)-98.3 °F (36.8 °C)] 97.8 °F (36.6 °C)  Heart Rate:  [70-87] 70  Resp:  [16-18] 16  BP: (107-123)/(54-61) 107/61       Physical Exam  Vitals reviewed.   Constitutional:       Appearance: He is not ill-appearing.   HENT:      Head: Normocephalic and atraumatic.      Right Ear: External ear normal.      Left Ear: External ear normal.      Nose: Nose normal.      Mouth/Throat:      Mouth: Mucous membranes are moist.   Eyes:      General:         Right eye: No discharge.         Left eye: No discharge.   Cardiovascular:      Rate and Rhythm: Normal rate and regular rhythm.      Pulses: Normal pulses.      Heart sounds: Normal heart sounds.   Pulmonary:      Effort: Pulmonary effort is normal.      Breath sounds: Normal breath sounds.   Abdominal:      General: Bowel sounds are normal.      Palpations: Abdomen is soft.   Musculoskeletal:         General: Normal range of motion.      Cervical back: Normal range of motion.   Skin:     General: Skin is warm and dry.      Coloration: Skin is pale.   Neurological:      Mental Status: He is alert and oriented to person, place, and time.   Psychiatric:         Behavior: Behavior normal.          Results Review:      Imaging Results (Last 24 Hours)     Procedure Component Value Units Date/Time    XR Chest 1 View [[id number]] Collected: 07/05/22 1140     Updated: 07/05/22 1143    Narrative:         DATE OF EXAM:   7/5/2022 11:12 AM     PROCEDURE:   XR CHEST 1 VW-     INDICATIONS:   soa     COMPARISON:  06/15/2022     TECHNIQUE:   Portable chest radiograph.     FINDINGS:    The cardiomediastinal silhouette is within normal limits. The lungs are  clear. There is no pneumothorax, focal consolidation, or large pleural  effusion. Osseous structures grossly intact. Left-sided AICD noted.       Impression:      No acute process.      Electronically Signed By-Valente Hoover MD On:7/5/2022 11:41 AM  This report was finalized on [id number] by  Valente Hoover MD.           Lab Results (last 24 hours)     Procedure Component Value Units Date/Time    Comprehensive Metabolic Panel [[id number]]  (Abnormal) Collected: 07/05/22 1055    Specimen: Blood Updated: 07/05/22 1128     Glucose 197 mg/dL      BUN 31 mg/dL      Creatinine 1.34 mg/dL      Sodium 139 mmol/L      Potassium 4.2 mmol/L      Chloride 103 mmol/L      CO2 24.0 mmol/L      Calcium 8.2 mg/dL      Total Protein 5.6 g/dL      Albumin 3.20 g/dL      ALT (SGPT) 11 U/L      AST (SGOT) 9 U/L      Alkaline Phosphatase 154 U/L      Total Bilirubin 0.6 mg/dL      Globulin 2.4 gm/dL      A/G Ratio 1.3 g/dL      BUN/Creatinine Ratio 23.1     Anion Gap 12.0 mmol/L      eGFR 55.2 mL/min/1.73      Comment: National Kidney Foundation and American Society of Nephrology (ASN) Task Force recommended calculation based on the Chronic Kidney Disease Epidemiology Collaboration (CKD-EPI) equation refit without adjustment for race.       Narrative:      GFR Normal >60  Chronic Kidney Disease <60  Kidney Failure <15      Troponin [[id number]]  (Normal) Collected: 07/05/22 1055    Specimen: Blood Updated: 07/05/22 1128     Troponin T <0.010 ng/mL     Narrative:      Troponin T Reference Range:  <= 0.03  ng/mL-   Negative for AMI  >0.03 ng/mL-     Abnormal for myocardial necrosis.  Clinicians would have to utilize clinical acumen, EKG, Troponin and serial changes to determine if it is an Acute Myocardial Infarction or myocardial injury due to an underlying chronic condition.       Results may be falsely decreased if patient taking Biotin.      BNP [[id number]]  (Normal) Collected: 07/05/22 1055    Specimen: Blood Updated: 07/05/22 1125     proBNP 876.2 pg/mL     Narrative:      Among patients with dyspnea, NT-proBNP is highly sensitive for the detection of acute congestive heart failure. In addition NT-proBNP of <300 pg/ml effectively rules out acute congestive heart failure with 99% negative predictive value.    Results may be falsely decreased if patient taking Biotin.      CBC & Differential [[id number]]  (Abnormal) Collected: 07/05/22 1055    Specimen: Blood Updated: 07/05/22 1105    Narrative:      The following orders were created for panel order CBC & Differential.  Procedure                               Abnormality         Status                     ---------                               -----------         ------                     CBC Auto Differential[[id number]]        Abnormal            Final result                 Please view results for these tests on the individual orders.    CBC Auto Differential [[id number]]  (Abnormal) Collected: 07/05/22 1055    Specimen: Blood Updated: 07/05/22 1105     WBC 5.90 10*3/mm3      RBC 2.71 10*6/mm3      Hemoglobin 7.4 g/dL      Hematocrit 23.1 %      MCV 85.1 fL      MCH 27.4 pg      MCHC 32.2 g/dL      RDW 20.0 %      RDW-SD 60.4 fl      MPV 8.3 fL      Platelets 274 10*3/mm3      Neutrophil % 60.7 %      Lymphocyte % 22.2 %      Monocyte % 9.2 %      Eosinophil % 5.9 %      Basophil % 2.0 %      Neutrophils, Absolute 3.50 10*3/mm3      Lymphocytes, Absolute 1.30 10*3/mm3      Monocytes, Absolute 0.50 10*3/mm3      Eosinophils, Absolute 0.30 10*3/mm3      Basophils,  Absolute 0.10 10*3/mm3      nRBC 0.3 /100 WBC            I reviewed the patient's new clinical results.    Assessment & Plan     Anemia with possible GI bleed  -GI consult  -monitor HGB  -I unit PRBC and if hgb less than 7 give unit  -hold eliquis and asa    Unstable angina/chest pain  -cardiology consult    Diabetes-ss/  Hypertension/CAD-imdur/lasix/lisinopril/metoprolol/ranexa  Hyperlipidemia-atrovastain  COPD-nebs/pulmicort   Chronic back pain-prn meds     Diet:diabetic  DVT prophylaxis:scd  GI prophylaxis:protonix  Code status:full      I discussed the patient's findings and my recommendations with patient.     Julissa Gunn, KOURTNEY  07/05/22  14:35 EDT

## 2022-07-05 NOTE — ED NOTES
Pt c/o midsternal cp that started 2 days ago, Pt is also c/o black stool, nausea, and sob. Pt has peripheral edema. Pt currently has bloody stool. Pt is not c/o of abd pain, vomiting, fever, cough, or headache. Pt is alert and oriented

## 2022-07-05 NOTE — DISCHARGE PLACEMENT REQUEST
"Ro Sidhu (76 y.o. Male)             Date of Birth   1945    Social Security Number       Address   [address] [place] [address] [place] [facility]    Home Phone   [phone]    MRN   [id number]       Holiness   Mandaeism    Marital Status                               Admission Date   7/5/22    Admission Type   Emergency    Admitting Provider   Jeane Sanders MD    Attending Provider   Douglas Camacho MD    Department, Room/Bed   [facility] EMERGENCY DEPARTMENT, 03/03       Discharge Date       Discharge Disposition       Discharge Destination                               Attending Provider: Douglas Camacho MD    Allergies: Haloperidol, Morphine, Pantoprazole, Latex    Isolation: None   Infection: MRSA No Isolation this Admit (04/19/22), MRSA (06/15/22)   Code Status: Prior   Advance Care Planning Activity    Ht: 182.9 cm (72\")   Wt: 127 kg (280 lb)    Admission Cmt: None   Principal Problem: None                Active Insurance as of 7/5/2022     Primary Coverage     Payor Plan Insurance Group Employer/Plan Group    HUMANA MEDICARE REPLACEMENT HU[employer]NA MEDICARE REPLACEMENT [id number]     Payor Plan Address Payor Plan Phone Number Payor Plan Fax Number Effective Dates    [address] [address] [phone]  1/1/2018 - None Entered    [facility] [id number]       Subscriber Name Subscriber Birth Date Member ID       RO SIDHU BETH 1945 [id number]                 Emergency Contacts      (Rel.) Home Phone Work Phone Mobile Phone    LONI SIDHU (Spouse) [phone] -- [phone]              "

## 2022-07-05 NOTE — ED PROVIDER NOTES
Subjective   History of Present Illness  76-year-old male presents with chest pain.  He describes it as discomfort in his chest radiates to both arms with heaviness he has had some shortness of breath nausea and diaphoresis.  He filled a bottle of 25 nitroglycerin 2 days ago has only 8 left.  He also has had some dark stool for the last 2 days.  He is had swelling in his legs that is gotten worse.  He has had no fever.  No vomiting.  Review of Systems   Constitutional: Positive for diaphoresis and fatigue. Negative for fever and unexpected weight change.   HENT: Negative for congestion and sore throat.    Eyes: Negative for pain.   Respiratory: Positive for shortness of breath. Negative for cough.    Cardiovascular: Positive for chest pain and leg swelling.   Gastrointestinal: Positive for nausea. Negative for abdominal pain, diarrhea and vomiting.        Dark stools   Genitourinary: Negative for dysuria and urgency.   Musculoskeletal: Positive for back pain.   Skin: Negative for rash.   Neurological: Positive for light-headedness. Negative for dizziness, weakness and headaches.   Psychiatric/Behavioral: Negative for confusion.       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) 10/26/2020   • Benign hypertension with CKD (chronic kidney disease) stage III (HCC) 10/26/2020   • Bruises easily    • CHF (congestive heart failure) (HCC)    • Chronic bronchitis with COPD (chronic obstructive pulmonary disease) (HCC) 10/26/2020   • Chronic respiratory failure with hypoxia (HCC) 10/26/2020   • Congenital heart defect    • Difficulty attaining erection    • Heart block    • Hernia of abdominal wall    • Hypertension    • Low back pain    • Pacemaker    • Peripheral vascular disease due to secondary diabetes (HCC) 10/26/2020   • Poor circulation    • Prostate cancer (HCC)     prostate   • Shortness of breath    • Sleep apnea     using CPAP    • Stage 3a chronic kidney disease  ([facility]) 10/26/2020   • Stented coronary artery 12/05/2019    MICHAEL to LAD   • Type 2 diabetes, controlled, with neuropathy ([facility]) 10/26/2020    no meds   • Type 2 diabetes, controlled, with neuropathy ([facility]) 10/26/2020       Allergies   Allergen Reactions   • Haloperidol Hives   • Morphine Itching   • Pantoprazole Other (See Comments)     Feels sick after receiving   • Latex Unknown - High Severity       Past Surgical History:   Procedure Laterality Date   • ABDOMINAL SURGERY     • APPENDECTOMY     • BONE CURETTAGE Right 1/22/2021    Procedure: Wound excision with fifth metatarsal head resection, right foot.;  Surgeon: Josef Egan DPM;  Location: [facility] MAIN OR;  Service: Podiatry;  Laterality: Right;   • BONE INCISION AND DRAINAGE Right 10/5/2020    Procedure: Incision and drainage with debridement of all nonviable soft tissue and bone with bone biopsy, right foot.;  Surgeon: Josef Egan DPM;  Location: [facility] MAIN OR;  Service: Podiatry;  Laterality: Right;   • BRONCHOSCOPY N/A 4/23/2022    Procedure: BRONCHOSCOPY with bronchioalveolar lavage of right lower lobe;  Surgeon: Edwin Kline MD;  Location: [facility] ENDOSCOPY;  Service: Pulmonary;  Laterality: N/A;  pneumonia   • CARDIAC CATHETERIZATION N/A 12/5/2019    Procedure: Left Heart Cath;  Surgeon: Mirza Munson MD;  Location: [facility] CATH INVASIVE LOCATION;  Service: Cardiology   • CARDIAC CATHETERIZATION N/A 12/5/2019    Procedure: Stent MICHAEL coronary;  Surgeon: Mirza Munson MD;  Location: [facility] CATH INVASIVE LOCATION;  Service: Cardiology   • CARDIAC CATHETERIZATION N/A 10/18/2021    Procedure: Left Heart Cath and coronary angiogram;  Surgeon: Celine Swanson MD;  Location: [facility] CATH INVASIVE LOCATION;  Service: Cardiovascular;  Laterality: N/A;   • CARDIAC CATHETERIZATION N/A 10/18/2021    Procedure: Right Heart Cath;  Surgeon: Celine Swanson MD;  Location: [facility] CATH INVASIVE LOCATION;  Service: Cardiovascular;   Laterality: N/A;   • CHOLECYSTECTOMY     • ELBOW PROCEDURE      left   • FOOT SURGERY      right foot   • HERNIA REPAIR     • INCISION AND DRAINAGE OF WOUND Right 4/6/2021    Procedure: INCISION AND DRAINAGE OF RIGHT FOOT 5TH METATARSAL TO BONE AND PARTIAL 5TH METATARSAL RESECTION;  Surgeon: Josef Egan DPM;  Location: [facility] MAIN OR;  Service: Podiatry;  Laterality: Right;   • INCISION AND DRAINAGE OF WOUND Right 4/8/2021    Procedure: INCISION AND DRAINAGE BONE, DELAYED PRIMARY CLOSURE;  Surgeon: Josef Egan DPM;  Location: [facility] MAIN OR;  Service: Podiatry;  Laterality: Right;   • INTERVENTIONAL RADIOLOGY PROCEDURE N/A 12/5/2019    Procedure: Intravascular Ultrasound;  Surgeon: Mirza Munson MD;  Location: [facility] CATH INVASIVE LOCATION;  Service: Cardiology   • PACEMAKER IMPLANTATION     • UT RT/LT HEART CATHETERS N/A 12/5/2019    Procedure: Percutaneous Coronary Intervention;  Surgeon: Mirza Munson MD;  Location: [facility] CATH INVASIVE LOCATION;  Service: Cardiology   • WOUND DEBRIDEMENT Right 10/29/2020    Procedure: Preparation and debridement of foot wound with application of Integra wound graft, right foot.;  Surgeon: Josef Egan DPM;  Location: [facility] MAIN OR;  Service: Podiatry;  Laterality: Right;   • WOUND DEBRIDEMENT N/A 7/29/2021    Procedure: EXCISIONAL ABDOMINAL WOUND  DEBRIDEMENT;  Surgeon: Cleopatra Wang MD;  Location: [facility] OR;  Service: General;  Laterality: N/A;   • WOUND DEBRIDEMENT N/A 8/1/2021    Procedure: DEBRIDEMENT OF ABDOMINAL WALL;  Surgeon: Hill Bhatia DO;  Location: [facility] MAIN OR;  Service: General;  Laterality: N/A;       Family History   Problem Relation Age of Onset   • Alzheimer's disease Mother    • GI problems Father    • Heart disease Father        Social History     Socioeconomic History   • Marital status:    Tobacco Use   • Smoking status: Former Smoker     Years: 15.00     Types: Cigarettes     Start date:  1966     Quit date: 5/10/2020     Years since quittin.1   • Smokeless tobacco: Never Used   Vaping Use   • Vaping Use: Never used   Substance and Sexual Activity   • Alcohol use: Yes     Alcohol/week: 4.0 standard drinks     Types: 4 Shots of liquor per week     Comment: occasionally   • Drug use: No   • Sexual activity: Defer     Prior to Admission medications    Medication Sig Start Date End Date Taking? Authorizing Provider   apixaban (ELIQUIS) 5 MG tablet tablet Take 5 mg by mouth 2 (Two) Times a Day.    Valentín Medina MD   aspirin 81 MG EC tablet Take 1 tablet by mouth Daily. 21   Sasha Patel APRN   atorvastatin (LIPITOR) 40 MG tablet Take 40 mg by mouth Every Morning.    Valentín Medina MD   benzonatate (TESSALON) 100 MG capsule Take 100 mg by mouth 3 (Three) Times a Day As Needed for Cough. 10/14/21   Valentín Medina MD   budesonide (PULMICORT) 0.5 MG/2ML nebulizer solution Take 0.5 mg by nebulization 2 (Two) Times a Day As Needed. 20   Valentín Medina MD   docusate sodium (COLACE) 100 MG capsule Take 200 mg by mouth 2 (Two) Times a Day.    Valentín Medina MD   famotidine (PEPCID) 20 MG tablet Take 1 tablet by mouth 2 (Two) Times a Day Before Meals. 22   Yusuf Jones MD   furosemide (LASIX) 40 MG tablet Take 40 mg by mouth Every Morning.    Valentín Medina MD   glimepiride (AMARYL) 2 MG tablet Take 2 mg by mouth Every Morning Before Breakfast.    Valentín Medina MD   ipratropium-albuterol (DUO-NEB) 0.5-2.5 mg/3 ml nebulizer Take 3 mL by nebulization 3 (Three) Times a Day. 22   Yusuf Jones MD   isosorbide mononitrate (IMDUR) 120 MG 24 hr tablet Take 1 tablet by mouth Daily. 22   Yusuf Jones MD   lisinopril (Zestril) 2.5 MG tablet Take 1 tablet by mouth Daily. 22   Yusuf Jones MD   metOLazone (ZAROXOLYN) 5 MG tablet Take 5 mg by mouth 3 (Three) Times a Week. Monday, Wednesday, Friday    Provider  MD Valentín   metoprolol tartrate (LOPRESSOR) 25 MG tablet Take 25 mg by mouth 2 (Two) Times a Day.    Valentín Medina MD   mirtazapine (REMERON) 30 MG tablet Take 30 mg by mouth Every Night. 7/11/19   Valentín Medina MD   nitroglycerin (NITROSTAT) 0.4 MG SL tablet Place 0.4 mg under the tongue Every 5 (Five) Minutes As Needed for Chest Pain. 2/2/12   Valentín Medina MD   oxyCODONE-acetaminophen (Percocet)  MG per tablet Take 1 tablet by mouth Every 6 (Six) Hours As Needed for Moderate Pain . 4/28/22   Herberth Oconnor MD   pregabalin (Lyrica) 150 MG capsule Take 150 mg by mouth 2 (Two) Times a Day.    Valentín Medina MD   ranolazine (RANEXA) 500 MG 12 hr tablet Take 500 mg by mouth 2 (Two) Times a Day.    Valentín Medina MD   rOPINIRole (REQUIP) 0.5 MG tablet Take 0.5 mg by mouth Every Night. Take 1 hour before bedtime.    Valentín Medina MD   traZODone (DESYREL) 100 MG tablet Take 100 mg by mouth Every Night.    Valentín Medina MD   VENTOLIN  (90 Base) MCG/ACT inhaler Inhale 2 puffs Every 6 (Six) Hours As Needed for Wheezing or Shortness of Air. 7/1/19   Valentín Medina MD   vitamin D (ERGOCALCIFEROL) 1.25 MG (80264 UT) capsule capsule Take 50,000 Units by mouth 1 (One) Time Per Week. Friday    Valentín Medina MD           Objective   Physical Exam  76-year-old male awake alert.  Generally overweight.  Pupils equal round at light.  Oropharynx mucous membranes moist neck supple chest clear equal breath sounds.  Cardiovascular regular rate and rhythm.  Abdomen soft nontender, he has very large ventral hernia.  Rectal exam dark stool heme positive extremities 2+ edema.  Neurologic exam without focal findings noted.  Skin without significant rash noted.  Psychiatric cooperative  Procedures           ED Course      Results for orders placed or performed during the hospital encounter of 07/05/22   Comprehensive Metabolic Panel    Specimen: Blood    Result Value Ref Range    Glucose 197 (H) 65 - 99 mg/dL    BUN 31 (H) 8 - 23 mg/dL    Creatinine 1.34 (H) 0.76 - 1.27 mg/dL    Sodium 139 136 - 145 mmol/L    Potassium 4.2 3.5 - 5.2 mmol/L    Chloride 103 98 - 107 mmol/L    CO2 24.0 22.0 - 29.0 mmol/L    Calcium 8.2 (L) 8.6 - 10.5 mg/dL    Total Protein 5.6 (L) 6.0 - 8.5 g/dL    Albumin 3.20 (L) 3.50 - 5.20 g/dL    ALT (SGPT) 11 1 - 41 U/L    AST (SGOT) 9 1 - 40 U/L    Alkaline Phosphatase 154 (H) 39 - 117 U/L    Total Bilirubin 0.6 0.0 - 1.2 mg/dL    Globulin 2.4 gm/dL    A/G Ratio 1.3 g/dL    BUN/Creatinine Ratio 23.1 7.0 - 25.0    Anion Gap 12.0 5.0 - 15.0 mmol/L    eGFR 55.2 (L) >60.0 mL/min/1.73   Troponin    Specimen: Blood   Result Value Ref Range    Troponin T <0.010 0.000 - 0.030 ng/mL   BNP    Specimen: Blood   Result Value Ref Range    proBNP 876.2 0.0 - 1,800.0 pg/mL   CBC Auto Differential    Specimen: Blood   Result Value Ref Range    WBC 5.90 3.40 - 10.80 10*3/mm3    RBC 2.71 (L) 4.14 - 5.80 10*6/mm3    Hemoglobin 7.4 (L) 13.0 - 17.7 g/dL    Hematocrit 23.1 (L) 37.5 - 51.0 %    MCV 85.1 79.0 - 97.0 fL    MCH 27.4 26.6 - 33.0 pg    MCHC 32.2 31.5 - 35.7 g/dL    RDW 20.0 (H) 12.3 - 15.4 %    RDW-SD 60.4 (H) 37.0 - 54.0 fl    MPV 8.3 6.0 - 12.0 fL    Platelets 274 140 - 450 10*3/mm3    Neutrophil % 60.7 42.7 - 76.0 %    Lymphocyte % 22.2 19.6 - 45.3 %    Monocyte % 9.2 5.0 - 12.0 %    Eosinophil % 5.9 0.3 - 6.2 %    Basophil % 2.0 (H) 0.0 - 1.5 %    Neutrophils, Absolute 3.50 1.70 - 7.00 10*3/mm3    Lymphocytes, Absolute 1.30 0.70 - 3.10 10*3/mm3    Monocytes, Absolute 0.50 0.10 - 0.90 10*3/mm3    Eosinophils, Absolute 0.30 0.00 - 0.40 10*3/mm3    Basophils, Absolute 0.10 0.00 - 0.20 10*3/mm3    nRBC 0.3 (H) 0.0 - 0.2 /100 WBC   ECG 12 Lead   Result Value Ref Range    QT Interval 448 ms   Type & Screen    Specimen: Blood   Result Value Ref Range    ABO Type O     RH type Positive     Antibody Screen Negative     T&S Expiration Date 7/8/2022 11:59:59 PM  "   Prepare RBC, 1 Units   Result Value Ref Range    Product Code V0643X01     Unit Number [id number]     UNIT  ABO O     UNIT  RH NEG     Crossmatch Interpretation Compatible     Dispense Status IS     Blood Expiration Date 202207082359     Blood Type Barcode 9500      XR Chest 1 View    Result Date: 7/5/2022  No acute process.  Electronically Signed By-Valente Hoover MD On:7/5/2022 11:41 AM This report was finalized on [id number] by  Valente Hoover MD.    Medications   sodium chloride 0.9 % flush 10 mL (has no administration in time range)   pantoprazole (PROTONIX) injection 40 mg (40 mg Intravenous Not Given 7/5/22 1216)   oxyCODONE (ROXICODONE) immediate release tablet 10 mg (10 mg Oral Given 7/5/22 1140)   ondansetron (ZOFRAN) injection 4 mg (4 mg Intravenous Given 7/5/22 1211)     /55   Pulse 70   Temp 97.8 °F (36.6 °C)   Resp 16   Ht 182.9 cm (72\")   Wt 127 kg (280 lb)   SpO2 100%   BMI 37.97 kg/m²                                        [facility]  Chart review: Patient admitted last month for pneumonia right lung.  He had been admitted earlier in the month for dyspnea COPD exacerbation  Comorbidity: As per past history  Differential: Unstable angina, GI bleed, acute blood loss anemia  My EKG interpretation: Ventricular paced rhythm  Lab: CBC remarkable for hemoglobin of 7.4 comprehensive metabolic panel BUN 31 creatinine 1.34 troponin BNP both normal  Radiology: I reviewed chest x-ray.  No acute cardiopulmonary abnormality noted  Discussion/treatment: Patient IV placed.  Was given Protonix and Zofran.  He was given oxycodone for his chronic back pain which is his baseline medication.  He was transfuse first unit packed red blood cells.  Review of his chart shows he had hemoglobin of 12.1, 2 weeks ago and his creatinine had been 1.18.  Patient was discussed with the nurse practitioner for Dr. Sanders will be admitted to her service for continued care GI and possible cardiology consult.  Patient " critical care time of 30 minutes independent of procedures  Patient was evaluated using appropriate PPE      Final diagnoses:   Unstable angina (HCC)   Upper GI bleed   Acute blood loss anemia       ED Disposition  ED Disposition     ED Disposition   Decision to Admit    Condition   --    Comment   --             No follow-up provider specified.       Medication List      No changes were made to your prescriptions during this visit.          Douglas Camacho MD  07/05/22 7625

## 2022-07-06 ENCOUNTER — ON CAMPUS - OUTPATIENT (OUTPATIENT)
Dept: URBAN - METROPOLITAN AREA HOSPITAL 85 | Facility: HOSPITAL | Age: 77
End: 2022-07-06

## 2022-07-06 DIAGNOSIS — E11.22 TYPE 2 DIABETES MELLITUS WITH DIABETIC CHRONIC KIDNEY DISEAS: ICD-10-CM

## 2022-07-06 DIAGNOSIS — K26.4 CHRONIC OR UNSPECIFIED DUODENAL ULCER WITH HEMORRHAGE: ICD-10-CM

## 2022-07-06 DIAGNOSIS — R11.0 NAUSEA: ICD-10-CM

## 2022-07-06 DIAGNOSIS — K31.89 OTHER DISEASES OF STOMACH AND DUODENUM: ICD-10-CM

## 2022-07-06 DIAGNOSIS — R74.8 ABNORMAL LEVELS OF OTHER SERUM ENZYMES: ICD-10-CM

## 2022-07-06 DIAGNOSIS — K92.1 MELENA: ICD-10-CM

## 2022-07-06 DIAGNOSIS — K76.0 FATTY (CHANGE OF) LIVER, NOT ELSEWHERE CLASSIFIED: ICD-10-CM

## 2022-07-06 DIAGNOSIS — K25.9 GASTRIC ULCER, UNSPECIFIED AS ACUTE OR CHRONIC, WITHOUT HEMO: ICD-10-CM

## 2022-07-06 DIAGNOSIS — D64.9 ANEMIA, UNSPECIFIED: ICD-10-CM

## 2022-07-06 DIAGNOSIS — N18.9 CHRONIC KIDNEY DISEASE, UNSPECIFIED: ICD-10-CM

## 2022-07-06 DIAGNOSIS — R10.13 EPIGASTRIC PAIN: ICD-10-CM

## 2022-07-06 DIAGNOSIS — K20.80 OTHER ESOPHAGITIS WITHOUT BLEEDING: ICD-10-CM

## 2022-07-06 PROBLEM — K92.2 UPPER GI BLEED: Status: ACTIVE | Noted: 2022-01-01

## 2022-07-06 PROCEDURE — 43255 EGD CONTROL BLEEDING ANY: CPT | Mod: 59 | Performed by: INTERNAL MEDICINE

## 2022-07-06 PROCEDURE — 43239 EGD BIOPSY SINGLE/MULTIPLE: CPT | Performed by: INTERNAL MEDICINE

## 2022-07-06 PROCEDURE — 99204 OFFICE O/P NEW MOD 45 MIN: CPT | Mod: 25 | Performed by: NURSE PRACTITIONER

## 2022-07-06 NOTE — ANESTHESIA PREPROCEDURE EVALUATION
Anesthesia Evaluation     NPO Solid Status: > 8 hours  NPO Liquid Status: > 8 hours           Airway   Mallampati: II  TM distance: >3 FB  No difficulty expected  Dental      Pulmonary    (+) COPD, sleep apnea,   Cardiovascular     (+) hypertension, CAD, dysrhythmias, angina, CHF , PVD,       Neuro/Psych  (+) numbness,    GI/Hepatic/Renal/Endo    (+) obesity,  hiatal hernia, GERD, GI bleeding , liver disease, renal disease, diabetes mellitus,     Musculoskeletal     Abdominal    Substance History      OB/GYN          Other   arthritis,    history of cancer                    Anesthesia Plan    ASA 4     MAC     intravenous induction     Anesthetic plan, risks, benefits, and alternatives have been provided, discussed and informed consent has been obtained with: patient.        CODE STATUS:    Level Of Support Discussed With: Patient  Code Status (Patient has no pulse and is not breathing): CPR (Attempt to Resuscitate)  Medical Interventions (Patient has pulse or is breathing): Full Support

## 2022-07-06 NOTE — PLAN OF CARE
Problem: Fall Injury Risk  Goal: Absence of Fall and Fall-Related Injury  Outcome: Ongoing, Progressing  Intervention: Identify and Manage Contributors  Recent Flowsheet Documentation  Taken 7/5/2022 1930 by Shakir Pfeiffer RN  Medication Review/Management: medications reviewed  Intervention: Promote Injury-Free Environment  Recent Flowsheet Documentation  Taken 7/6/2022 0153 by Shakir Pfeiffer RN  Safety Promotion/Fall Prevention:   assistive device/personal items within reach   clutter free environment maintained   fall prevention program maintained   lighting adjusted   nonskid shoes/slippers when out of bed   room organization consistent   safety round/check completed  Taken 7/6/2022 0000 by Shakir Pfeiffer RN  Safety Promotion/Fall Prevention:   assistive device/personal items within reach   clutter free environment maintained   fall prevention program maintained   lighting adjusted   nonskid shoes/slippers when out of bed   room organization consistent   safety round/check completed  Taken 7/5/2022 2200 by Shakir Pfeiffer RN  Safety Promotion/Fall Prevention:   assistive device/personal items within reach   clutter free environment maintained   fall prevention program maintained   lighting adjusted   nonskid shoes/slippers when out of bed   room organization consistent   safety round/check completed  Taken 7/5/2022 1930 by Shakir Pfeiffer RN  Safety Promotion/Fall Prevention:   assistive device/personal items within reach   clutter free environment maintained   fall prevention program maintained   lighting adjusted   nonskid shoes/slippers when out of bed   room organization consistent   safety round/check completed     Problem: Adult Inpatient Plan of Care  Goal: Plan of Care Review  Outcome: Ongoing, Progressing  Flowsheets (Taken 7/6/2022 0155)  Plan of Care Reviewed With: patient  Goal: Patient-Specific Goal (Individualized)  Outcome: Ongoing, Progressing  Goal: Absence of Hospital-Acquired Illness  or Injury  Outcome: Ongoing, Progressing  Intervention: Identify and Manage Fall Risk  Recent Flowsheet Documentation  Taken 7/6/2022 0153 by Shakir Pfeiffer RN  Safety Promotion/Fall Prevention:   assistive device/personal items within reach   clutter free environment maintained   fall prevention program maintained   lighting adjusted   nonskid shoes/slippers when out of bed   room organization consistent   safety round/check completed  Taken 7/6/2022 0000 by Shakir Pfeiffer RN  Safety Promotion/Fall Prevention:   assistive device/personal items within reach   clutter free environment maintained   fall prevention program maintained   lighting adjusted   nonskid shoes/slippers when out of bed   room organization consistent   safety round/check completed  Taken 7/5/2022 2200 by Shakir Pfeiffer RN  Safety Promotion/Fall Prevention:   assistive device/personal items within reach   clutter free environment maintained   fall prevention program maintained   lighting adjusted   nonskid shoes/slippers when out of bed   room organization consistent   safety round/check completed  Taken 7/5/2022 1930 by Shakir Pfeiffer RN  Safety Promotion/Fall Prevention:   assistive device/personal items within reach   clutter free environment maintained   fall prevention program maintained   lighting adjusted   nonskid shoes/slippers when out of bed   room organization consistent   safety round/check completed  Intervention: Prevent Skin Injury  Recent Flowsheet Documentation  Taken 7/5/2022 1930 by Shakir Pfeiffer RN  Body Position: position changed independently  Intervention: Prevent and Manage VTE (Venous Thromboembolism) Risk  Recent Flowsheet Documentation  Taken 7/5/2022 1930 by Shakir Pfeiffer RN  VTE Prevention/Management:   sequential compression devices off   patient refused intervention  Intervention: Prevent Infection  Recent Flowsheet Documentation  Taken 7/6/2022 0153 by Shakir Pfeiffer RN  Infection Prevention:    single patient room provided   rest/sleep promoted   personal protective equipment utilized   hand hygiene promoted  Taken 7/6/2022 0000 by Shakir Pfeiffer RN  Infection Prevention:   single patient room provided   rest/sleep promoted   personal protective equipment utilized   hand hygiene promoted  Taken 7/5/2022 2200 by Shakir Pfeiffer RN  Infection Prevention:   single patient room provided   rest/sleep promoted   personal protective equipment utilized   hand hygiene promoted  Taken 7/5/2022 1930 by Shakir Pfeiffer RN  Infection Prevention:   single patient room provided   personal protective equipment utilized   hand hygiene promoted  Goal: Optimal Comfort and Wellbeing  Outcome: Ongoing, Progressing  Intervention: Monitor Pain and Promote Comfort  Recent Flowsheet Documentation  Taken 7/5/2022 1930 by Shakir Pfeiffer RN  Pain Management Interventions:   see MAR   position adjusted  Intervention: Provide Person-Centered Care  Recent Flowsheet Documentation  Taken 7/5/2022 1930 by Shakir Pfeiffer RN  Trust Relationship/Rapport:   care explained   choices provided   questions answered   questions encouraged   thoughts/feelings acknowledged  Goal: Readiness for Transition of Care  Outcome: Ongoing, Progressing  Intervention: Mutually Develop Transition Plan  Recent Flowsheet Documentation  Taken 7/5/2022 2213 by Shakir Pfeiffer RN  Transportation Anticipated: family or friend will provide  Patient/Family Anticipated Services at Transition:   Patient/Family Anticipates Transition to:   home with family   home with help/services  Taken 7/5/2022 2211 by Shakir Pfeiffer RN  Equipment Currently Used at Home:   walker, rolling   oxygen   nebulizer   glucometer     Problem: COPD (Chronic Obstructive Pulmonary Disease) Comorbidity  Goal: Maintenance of COPD Symptom Control  Outcome: Ongoing, Progressing  Intervention: Maintain COPD-Symptom Control  Recent Flowsheet Documentation  Taken 7/5/2022 1930 by  Shakir Pfeiffer RN  Medication Review/Management: medications reviewed     Problem: Diabetes Comorbidity  Goal: Blood Glucose Level Within Targeted Range  Outcome: Ongoing, Progressing     Problem: Heart Failure Comorbidity  Goal: Maintenance of Heart Failure Symptom Control  Outcome: Ongoing, Progressing  Intervention: Maintain Heart Failure-Management  Recent Flowsheet Documentation  Taken 7/5/2022 1930 by Shakir Pfeiffer RN  Medication Review/Management: medications reviewed     Problem: Hypertension Comorbidity  Goal: Blood Pressure in Desired Range  Outcome: Ongoing, Progressing  Intervention: Maintain Blood Pressure Management  Recent Flowsheet Documentation  Taken 7/5/2022 1930 by Shakir Pfeiffer RN  Medication Review/Management: medications reviewed     Problem: Pain Chronic (Persistent) (Comorbidity Management)  Goal: Acceptable Pain Control and Functional Ability  Outcome: Ongoing, Progressing  Intervention: Manage Persistent Pain  Recent Flowsheet Documentation  Taken 7/5/2022 1930 by Shakir Pfeiffer RN  Medication Review/Management: medications reviewed  Intervention: Develop Pain Management Plan  Recent Flowsheet Documentation  Taken 7/5/2022 1930 by Shakir Pfeiffer RN  Pain Management Interventions:   see MAR   position adjusted   Goal Outcome Evaluation:  Plan of Care Reviewed With: patient   Pt very pleasant. Given prn pain medication for back pain. Has not had any stools since being on BRENDON. Safety precautions maintained and call light within reach.

## 2022-07-06 NOTE — CASE MANAGEMENT/SOCIAL WORK
Discharge Planning Assessment   Cl[facility]     Patient Name: Ro Nathan  MRN: [id number]  Today's Date: 7/6/2022    Admit Date: 7/5/2022     Discharge Needs Assessment     Row Name 07/06/22 1411       Living Environment    People in Home spouse    Name(s) of People in Home Meghan (spouse)    Current Living Arrangements home    Primary Care Provided by self    Provides Primary Care For no one    Family Caregiver if Needed spouse    Quality of Family Relationships involved;helpful;supportive    Able to Return to Prior Arrangements yes    Living Arrangement Comments Lives w/spouse       Resource/Environmental Concerns    Resource/Environmental Concerns none    Transportation Concerns none       Transition Planning    Patient/Family Anticipates Transition to home with family    Patient/Family Anticipated Services at Transition none    Transportation Anticipated family or friend will provide       Discharge Needs Assessment    Readmission Within the Last 30 Days no previous admission in last 30 days    Current Outpatient/Agency/Support Group homecare agency    Equipment Currently Used at Home nebulizer;glucometer;walker, rolling;oxygen  02 2L prn (unsure of DME provider)    Concerns to be Addressed discharge planning    Anticipated Changes Related to Illness none    Equipment Needed After Discharge none    Outpatient/Agency/Support Group Needs homecare agency    Discharge Facility/Level of Care Needs home with home health    Provided Post Acute Provider List? N/A    Provided Post Acute Provider Quality & Resource List? N/A               Discharge Plan     Row Name 07/06/22 1414       Plan    Plan Anticipates Home w/[facility] (current w/service & accepted 7/6). Lives w/spouse.    Patient/Family in Agreement with Plan yes    Plan Comments CM to patient's room to complete initial assessment.Patient lives w/spouse, who will transport at discharge. Patient has nebulizer, glucometer, & RW.02 2L prn (unsure of DME provider).  Current w/[facility]. Referral sent & [facility] accepted (7/6). Patient drives, but is retired. Patient agreeable to Meds to Bed and CM updated pharmacy.              Continued Care and Services - Admitted Since 7/5/2022     Home Medical Care Coordination complete.    Service Provider Request Status Selected Services Address Phone Fax Patient Preferred    [facility]   Selected Home Health Services [address] [place] [id number] [phone] --            Selected Continued Care - Prior Encounters Includes selections from prior encounters from 4/6/2022 to 7/6/2022            Expected Discharge Date and Time     Expected Discharge Date Expected Discharge Time    Jul 8, 2022          Demographic Summary     Row Name 07/06/22 1410       General Information    Admission Type observation    Arrived From emergency department    Required Notices Provided Observation Status Notice    Referral Source emergency department    Reason for Consult discharge planning    Preferred Language English       Contact Information    Permission Granted to Share Info With                Functional Status     Row Name 07/06/22 1411       Functional Status    Usual Activity Tolerance good    Current Activity Tolerance good       Functional Status, IADL    Medications independent    Meal Preparation independent    Housekeeping independent    Laundry independent    Shopping independent       Mental Status    General Appearance WDL WDL       Employment/    Employment Status retired            Met with patient in room wearing PPE: mask, face shield/goggles, gloves, gown.      Maintained distance greater than six feet and spent less than 15 minutes in the room.    Connie Baxter RN, MSN  Care Manager  [phone]

## 2022-07-06 NOTE — PROGRESS NOTES
LOS: 0 days   Patient Care Team:  Yusuf Jones MD as PCP - General  PhiJoshua MD as Consulting Physician (Cardiology)  Moise Watson MD as Consulting Physician (Cardiac Electrophysiology)  Izzy Alvarez MD as Consulting Physician (Nephrology)  Celine Swanson MD as Consulting Physician (Cardiology)    Subjective:  pain    Objective:   Events noted      Review of Systems:   Review of Systems   Respiratory: Positive for chest tightness.    Cardiovascular: Positive for chest pain.   Gastrointestinal: Positive for abdominal distention and abdominal pain.   Musculoskeletal: Positive for back pain.           Vital Signs  Temp:  [97.5 °F (36.4 °C)-98 °F (36.7 °C)] 97.5 °F (36.4 °C)  Heart Rate:  [70-80] 71  Resp:  [13-20] 16  BP: (112-159)/(46-68) 144/59    Physical Exam:  Physical Exam  Vitals and nursing note reviewed.   Cardiovascular:      Rate and Rhythm: Normal rate.   Pulmonary:      Breath sounds: Normal breath sounds.   Skin:     General: Skin is warm.   Neurological:      Mental Status: He is alert.          Radiology:  XR Chest 1 View    Result Date: 7/5/2022  No acute process.  Electronically Signed By-Valente Hovoer MD On:7/5/2022 11:41 AM This report was finalized on [id number] by  Valente Hoover MD.    XR Abdomen KUB    Result Date: 7/5/2022  1. Nonspecific bowel gas pattern. Moderate gas-filled colonic distention. Overall mild chronic stool burden. Recommend radiographic follow-up or further assessment with CT if patient's symptoms do not improve.  Electronically Signed By-Jacques To MD On:7/5/2022 10:25 PM This report was finalized on [id number] by  Jacques To MD.         Results Review:     I reviewed the patient's new clinical results.  I reviewed the patient's new imaging results and agree with the interpretation.    Medication Review:   Scheduled Meds:famotidine, 40 mg, Oral, BID AC  ipratropium-albuterol, 3 mL, Nebulization, 4x Daily -  RT  sodium chloride, 3 mL, Intravenous, Q12H      Continuous Infusions:   PRN Meds:.•  acetaminophen  •  hydrALAZINE  •  HYDROmorphone  •  nitroglycerin  •  ondansetron **OR** ondansetron  •  oxyCODONE  •  [COMPLETED] Insert peripheral IV **AND** sodium chloride  •  sodium chloride    Labs:    CBC    Results from last 7 days   Lab Units 07/06/22 0135 07/05/22 2003 07/05/22  1055   WBC 10*3/mm3 7.00  --  5.90   HEMOGLOBIN g/dL 8.2* 8.6* 7.4*   PLATELETS 10*3/mm3 256  --  274     BMP   Results from last 7 days   Lab Units 07/06/22 0135 07/05/22  1055   SODIUM mmol/L 138 139   POTASSIUM mmol/L 4.1 4.2   CHLORIDE mmol/L 102 103   CO2 mmol/L 25.0 24.0   BUN mg/dL 27* 31*   CREATININE mg/dL 1.23 1.34*   GLUCOSE mg/dL 219* 197*     Cr Clearance Estimated Creatinine Clearance: 71.5 mL/min (by C-G formula based on SCr of 1.23 mg/dL).  Coag     HbA1C   Lab Results   Component Value Date    HGBA1C 8.5 (H) 06/02/2022    HGBA1C 8.5 (H) 04/19/2022    HGBA1C 6.7 (H) 09/28/2021     Blood Glucose   Glucose   Date/Time Value Ref Range Status   07/06/2022 1631 190 (H) 70 - 105 mg/dL Final     Comment:     Serial Number: [id number]:  [id number]   07/06/2022 1142 162 (H) 70 - 105 mg/dL Final     Comment:     Serial Number: [id number]:  [id number]   07/06/2022 0729 171 (H) 70 - 105 mg/dL Final     Comment:     Serial Number: [id number]:  [id number]   07/05/2022 2016 190 (H) 70 - 105 mg/dL Final     Comment:     Serial Number: [id number]:  [id number]     Infection     CMP   Results from last 7 days   Lab Units 07/06/22 0135 07/05/22  1055   SODIUM mmol/L 138 139   POTASSIUM mmol/L 4.1 4.2   CHLORIDE mmol/L 102 103   CO2 mmol/L 25.0 24.0   BUN mg/dL 27* 31*   CREATININE mg/dL 1.23 1.34*   GLUCOSE mg/dL 219* 197*   ALBUMIN g/dL  --  3.20*   BILIRUBIN mg/dL  --  0.6   ALK PHOS U/L  --  154*   AST (SGOT) U/L  --  9   ALT (SGPT) U/L  --  11     UA      Radiology(recent) XR Chest 1 View    Result Date: 7/5/2022  No  acute process.  Electronically Signed By-Valente Hoover MD On:7/5/2022 11:41 AM This report was finalized on [id number] by  Valente Hoover MD.    XR Abdomen KUB    Result Date: 7/5/2022  1. Nonspecific bowel gas pattern. Moderate gas-filled colonic distention. Overall mild chronic stool burden. Recommend radiographic follow-up or further assessment with CT if patient's symptoms do not improve.  Electronically Signed By-Jacques To MD On:7/5/2022 10:25 PM This report was finalized on [id number] by  Jacques To MD.     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

## 2022-07-06 NOTE — OP NOTE
ESOPHAGOGASTRODUODENOSCOPY Procedure Report    Patient Name:  Ro Nathan  YOB: 1945    Date of Surgery:  7/6/2022     Pre-Op Diagnosis:  Upper GI bleed [K92.2]       Post-Op Diagnosis Codes:     * Upper GI bleed [K92.2]  Nonbleeding 1 cm cratered duodenal bulb ulcer with visible vessel injected with 4 cc of 1-10,000 epinephrine and cauterized via gold probe cautery  Erosive gastritis with 4 mm antral ulcer biopsied  Grade B esophagitis    Procedure/CPT® Codes:      Procedure(s):  ESOPHAGOGASTRODUODENOSCOPY    Staff:  Surgeon(s):  Douglas Delgadillo MD      Anesthesia: Monitored Anesthesia Care    Description of Procedure:  A description of the procedure as well as risks, benefits and alternative methods were explained to the patient who voiced understanding and signed the corresponding consent form. A physical exam was performed and vital signs were monitored throughout the procedure.    An upper GI endoscope was placed into the mouth and proceeded through the esophagus, stomach and second portion of the duodenum without difficulty. The scope was then retroflexed and the fundus was visualized. The procedure was not difficult and there were no immediate complications.    Specimen:        See Below    Estimated blood loss: none    Complications:  None    Findings:  Nonbleeding 1 cm cratered duodenal bulb ulcer with visible vessel injected with 4 cc of 1-10,000 epinephrine and cauterized via gold probe cautery  Erosive gastritis with 4 mm antral ulcer biopsied  Grade B esophagitis    Impression:  Melena and blood loss anemia due to duodenal ulcer    Recommendations:  Follow up biopsy results  Monitor hemoglobin  Continue Pepcid and add Carafate since he is allergic to pantoprazole  Avoid NSAIDs  Start diet      Douglas Delgadillo MD     Date: 7/6/2022    Time: 14:48 EDT

## 2022-07-06 NOTE — CONSULTS
GI CONSULT  NOTE:    Referring Provider:  Dr. Jones    Chief complaint: Chest pain and black stool    Subjective .     History of present illness: Patient is a 76-year-old male with history of diabetes, CAD/PCI on Eliquis and aspirin, COPD, fatty liver, chronic kidney disease, pacemaker, prostate cancer, and large abdominal hernias which have previously been operated on.  He presented to the hospital yesterday with complaints of chest pain and black stools over the past 3 days.  He has been having 1 or 2 black stools per day.  Normally has some mild constipation.  He is on Eliquis and aspirin which he last took yesterday.  No NSAID use.  He denies any further chest pain today but does report some epigastric pain.  No heartburn or dysphagia.  Had some mild nausea yesterday as well but no vomiting.  He does report remote history of bleeding ulcers.      Endo History:  6/10/15 EGD/Colonoscopy (Dr Kiser) erosive gastritis, mild duodenitis. Chronic gastritis. HP negative. TA polyp, grade II internal hemorrhoids. RECALL 2020  10/3/12- EGD with bx (Dr Kiser) erosive esophagitis. Histologically unremarkable.  2008-EGD (Dr Kiser) squamocolumnar line at 3 cm above GE junction in irregular fashion.      Past Medical History:  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) 10/26/2020   • Benign hypertension with CKD (chronic kidney disease) stage III (HCC) 10/26/2020   • Bruises easily    • CHF (congestive heart failure) (HCC)    • Chronic bronchitis with COPD (chronic obstructive pulmonary disease) (HCC) 10/26/2020   • Chronic respiratory failure with hypoxia (HCC) 10/26/2020   • Congenital heart defect    • Difficulty attaining erection    • Heart block    • Hernia of abdominal wall    • Hypertension    • Low back pain    • Pacemaker    • Peripheral vascular disease due to secondary diabetes (HCC) 10/26/2020   • Poor circulation    • Prostate cancer (HCC)     prostate   •  Shortness of breath    • Sleep apnea     using CPAP    • Stage 3a chronic kidney disease ([facility]) 10/26/2020   • Stented coronary artery 12/05/2019    MICHAEL to LAD   • Type 2 diabetes, controlled, with neuropathy ([facility]) 10/26/2020    no meds   • Type 2 diabetes, controlled, with neuropathy ([facility]) 10/26/2020       Past Surgical History:  Past Surgical History:   Procedure Laterality Date   • ABDOMINAL SURGERY     • APPENDECTOMY     • BONE CURETTAGE Right 1/22/2021    Procedure: Wound excision with fifth metatarsal head resection, right foot.;  Surgeon: Josef Egan DPM;  Location: [facility] MAIN OR;  Service: Podiatry;  Laterality: Right;   • BONE INCISION AND DRAINAGE Right 10/5/2020    Procedure: Incision and drainage with debridement of all nonviable soft tissue and bone with bone biopsy, right foot.;  Surgeon: Josef Egan DPM;  Location: [facility] MAIN OR;  Service: Podiatry;  Laterality: Right;   • BRONCHOSCOPY N/A 4/23/2022    Procedure: BRONCHOSCOPY with bronchioalveolar lavage of right lower lobe;  Surgeon: Edwin Kline MD;  Location: [facility] ENDOSCOPY;  Service: Pulmonary;  Laterality: N/A;  pneumonia   • CARDIAC CATHETERIZATION N/A 12/5/2019    Procedure: Left Heart Cath;  Surgeon: Mirza Munson MD;  Location: [facility] CATH INVASIVE LOCATION;  Service: Cardiology   • CARDIAC CATHETERIZATION N/A 12/5/2019    Procedure: Stent MICHAEL coronary;  Surgeon: Mirza Munson MD;  Location: [facility] CATH INVASIVE LOCATION;  Service: Cardiology   • CARDIAC CATHETERIZATION N/A 10/18/2021    Procedure: Left Heart Cath and coronary angiogram;  Surgeon: Celine Swanson MD;  Location: [facility] CATH INVASIVE LOCATION;  Service: Cardiovascular;  Laterality: N/A;   • CARDIAC CATHETERIZATION N/A 10/18/2021    Procedure: Right Heart Cath;  Surgeon: Celine Swanson MD;  Location: [facility] CATH INVASIVE LOCATION;  Service: Cardiovascular;  Laterality: N/A;   • CHOLECYSTECTOMY     • ELBOW PROCEDURE      left   •  FOOT SURGERY      right foot   • HERNIA REPAIR     • INCISION AND DRAINAGE OF WOUND Right 2021    Procedure: INCISION AND DRAINAGE OF RIGHT FOOT 5TH METATARSAL TO BONE AND PARTIAL 5TH METATARSAL RESECTION;  Surgeon: Josef Egan DPM;  Location: [facility] MAIN OR;  Service: Podiatry;  Laterality: Right;   • INCISION AND DRAINAGE OF WOUND Right 2021    Procedure: INCISION AND DRAINAGE BONE, DELAYED PRIMARY CLOSURE;  Surgeon: Josef gEan DPM;  Location: [facility] MAIN OR;  Service: Podiatry;  Laterality: Right;   • INTERVENTIONAL RADIOLOGY PROCEDURE N/A 2019    Procedure: Intravascular Ultrasound;  Surgeon: Mirza Munson MD;  Location: [facility] CATH INVASIVE LOCATION;  Service: Cardiology   • PACEMAKER IMPLANTATION     • PA RT/LT HEART CATHETERS N/A 2019    Procedure: Percutaneous Coronary Intervention;  Surgeon: Mirza Munson MD;  Location: [facility] CATH INVASIVE LOCATION;  Service: Cardiology   • WOUND DEBRIDEMENT Right 10/29/2020    Procedure: Preparation and debridement of foot wound with application of Integra wound graft, right foot.;  Surgeon: Josef Egan DPM;  Location: [facility] MAIN OR;  Service: Podiatry;  Laterality: Right;   • WOUND DEBRIDEMENT N/A 2021    Procedure: EXCISIONAL ABDOMINAL WOUND  DEBRIDEMENT;  Surgeon: Cleopatra Wang MD;  Location: [facility] MAIN OR;  Service: General;  Laterality: N/A;   • WOUND DEBRIDEMENT N/A 2021    Procedure: DEBRIDEMENT OF ABDOMINAL WALL;  Surgeon: Hill Bhatia DO;  Location: [facility] MAIN OR;  Service: General;  Laterality: N/A;       Social History:  Social History     Tobacco Use   • Smoking status: Former Smoker     Years: 15.00     Types: Cigarettes     Start date: 1966     Quit date: 5/10/2020     Years since quittin.1   • Smokeless tobacco: Never Used   Vaping Use   • Vaping Use: Never used   Substance Use Topics   • Alcohol use: Yes     Alcohol/week: 4.0 standard drinks     Types: 4 Shots of  liquor per week     Comment: occasionally   • Drug use: No       Family History:  Family History   Problem Relation Age of Onset   • Alzheimer's disease Mother    • GI problems Father    • Heart disease Father        Medications:  Medications Prior to Admission   Medication Sig Dispense Refill Last Dose   • apixaban (ELIQUIS) 5 MG tablet tablet Take 5 mg by mouth 2 (Two) Times a Day.      • aspirin 81 MG EC tablet Take 1 tablet by mouth Daily. 30 tablet 5    • atorvastatin (LIPITOR) 40 MG tablet Take 40 mg by mouth Every Morning.      • benzonatate (TESSALON) 100 MG capsule Take 100 mg by mouth 3 (Three) Times a Day As Needed for Cough.      • budesonide (PULMICORT) 0.5 MG/2ML nebulizer solution Take 0.5 mg by nebulization 2 (Two) Times a Day As Needed.      • docusate sodium (COLACE) 100 MG capsule Take 200 mg by mouth 2 (Two) Times a Day.      • famotidine (PEPCID) 20 MG tablet Take 1 tablet by mouth 2 (Two) Times a Day Before Meals. 60 tablet 0    • furosemide (LASIX) 40 MG tablet Take 40 mg by mouth Every Morning.      • glimepiride (AMARYL) 2 MG tablet Take 2 mg by mouth Every Morning Before Breakfast.      • ipratropium-albuterol (DUO-NEB) 0.5-2.5 mg/3 ml nebulizer Take 3 mL by nebulization 3 (Three) Times a Day. 360 mL 3    • isosorbide mononitrate (IMDUR) 120 MG 24 hr tablet Take 1 tablet by mouth Daily. 30 tablet 3    • lisinopril (Zestril) 2.5 MG tablet Take 1 tablet by mouth Daily. 30 tablet 3    • metOLazone (ZAROXOLYN) 5 MG tablet Take 5 mg by mouth 3 (Three) Times a Week. Monday, Wednesday, Friday      • metoprolol tartrate (LOPRESSOR) 25 MG tablet Take 25 mg by mouth 2 (Two) Times a Day.      • mirtazapine (REMERON) 30 MG tablet Take 30 mg by mouth Every Night.      • nitroglycerin (NITROSTAT) 0.4 MG SL tablet Place 0.4 mg under the tongue Every 5 (Five) Minutes As Needed for Chest Pain.      • oxyCODONE-acetaminophen (Percocet)  MG per tablet Take 1 tablet by mouth Every 6 (Six) Hours As Needed  "for Moderate Pain . 120 tablet 0    • pregabalin (Lyrica) 150 MG capsule Take 150 mg by mouth 2 (Two) Times a Day.      • ranolazine (RANEXA) 500 MG 12 hr tablet Take 500 mg by mouth 2 (Two) Times a Day.      • rOPINIRole (REQUIP) 0.5 MG tablet Take 0.5 mg by mouth Every Night. Take 1 hour before bedtime.      • traZODone (DESYREL) 100 MG tablet Take 100 mg by mouth Every Night.      • VENTOLIN  (90 Base) MCG/ACT inhaler Inhale 2 puffs Every 6 (Six) Hours As Needed for Wheezing or Shortness of Air.      • vitamin D (ERGOCALCIFEROL) 1.25 MG (01084 UT) capsule capsule Take 50,000 Units by mouth 1 (One) Time Per Week. Friday          Scheduled Meds:famotidine, 20 mg, Oral, BID AC  ipratropium-albuterol, 3 mL, Nebulization, 4x Daily - RT  sodium chloride, 3 mL, Intravenous, Q12H      Continuous Infusions:   PRN Meds:.•  acetaminophen  •  hydrALAZINE  •  nitroglycerin  •  ondansetron **OR** ondansetron  •  oxyCODONE  •  [COMPLETED] Insert peripheral IV **AND** sodium chloride  •  sodium chloride    ALLERGIES:  Haloperidol, Morphine, Pantoprazole, and Latex    ROS:  Review of Systems   Constitutional: Negative for chills and fever.   Respiratory: Negative for cough and shortness of breath.    Cardiovascular: Positive for chest pain. Negative for palpitations.   Gastrointestinal: Positive for abdominal pain, blood in stool and nausea. Negative for vomiting.   Musculoskeletal: Positive for arthralgias and back pain.   Neurological: Negative for dizziness and weakness.   Psychiatric/Behavioral: Negative for agitation and confusion.       Objective     Vital Signs:   Visit Vitals  /68 (BP Location: Right arm, Patient Position: Lying)   Pulse 70   Temp 97.7 °F (36.5 °C) (Oral)   Resp 16   Ht 182.9 cm (72\")   Wt 127 kg (280 lb)   SpO2 92%   BMI 37.97 kg/m²       Physical Exam:      General Appearance:    Awake and alert, in no acute distress   Head:    Normocephalic, without obvious abnormality, atraumatic   Eyes:  "           Conjunctivae normal, anicteric sclera   Ears:    Ears appear intact with no abnormalities noted   Throat:   No oral lesions, no thrush, oral mucosa moist   Neck:   No adenopathy, supple, no thyromegaly, no JVD   Lungs:     Respirations regular, even and unlabored       Chest Wall:    No abnormalities observed   Abdomen:     Soft, tenderness epigastric area, large abdominal hernias which are soft and reducible, small scabs on abdomen, no rebound or guarding, non-distended, no hepatosplenomegaly   Rectal:     Deferred   Extremities:   Moves all extremities well, no edema, no cyanosis, no             redness   Pulses:   Pulses palpable and equal bilaterally   Skin:   No bleeding, bruising or rash, no jaundice   Lymph nodes:   No palpable adenopathy   Neurologic:   Cranial nerves 2 - 12 grossly intact, no asterixis, sensation intact       Results Review:   I reviewed the patient's labs and imaging.  CBC  Results from last 7 days   Lab Units 07/06/22 0135 07/05/22 2003 07/05/22  1055   RBC 10*6/mm3 2.91*  --  2.71*   WBC 10*3/mm3 7.00  --  5.90   HEMOGLOBIN g/dL 8.2* 8.6* 7.4*   PLATELETS 10*3/mm3 256  --  274       CMP  Results from last 7 days   Lab Units 07/06/22  0135 07/05/22  1055   SODIUM mmol/L 138 139   POTASSIUM mmol/L 4.1 4.2   CHLORIDE mmol/L 102 103   CO2 mmol/L 25.0 24.0   BUN mg/dL 27* 31*   CREATININE mg/dL 1.23 1.34*   GLUCOSE mg/dL 219* 197*   ALBUMIN g/dL  --  3.20*   BILIRUBIN mg/dL  --  0.6   ALK PHOS U/L  --  154*   AST (SGOT) U/L  --  9   ALT (SGPT) U/L  --  11       Amylase and Lipase        CRP         Imaging Results (Last 24 Hours)     Procedure Component Value Units Date/Time    XR Abdomen KUB [[id number]] Collected: 07/05/22 2222     Updated: 07/05/22 2227    Narrative:      DATE OF EXAM:  7/5/2022 9:58 PM     PROCEDURE:  XR ABDOMEN KUB-     INDICATIONS:  Abdominal pain; I20.0-Unstable angina; K92.2-Gastrointestinal  hemorrhage, unspecified; D62-Acute posthemorrhagic anemia      COMPARISON:  KUB dated 6/8/2015     TECHNIQUE:   Single radiographic view of the abdomen was obtained.     FINDINGS:  The lung bases are clear. There are right upper quadrant cholecystectomy  clips. There are no visible renal calcifications. There is gaseous  distention of the colon. There is no definite small bowel distention.  There is a small rectal stool burden. There is a moderate right colonic  stool burden. There are stable linear radiopaque foreign bodies  projecting at the pelvis.        Impression:      1. Nonspecific bowel gas pattern. Moderate gas-filled colonic  distention. Overall mild chronic stool burden. Recommend radiographic  follow-up or further assessment with CT if patient's symptoms do not  improve.     Electronically Signed By-Jacques To MD On:7/5/2022 10:25 PM  This report was finalized on [id number] by  Jacques To MD.    XR Chest 1 View [[id number]] Collected: 07/05/22 1140     Updated: 07/05/22 1143    Narrative:         DATE OF EXAM:   7/5/2022 11:12 AM     PROCEDURE:   XR CHEST 1 VW-     INDICATIONS:   soa     COMPARISON:  06/15/2022     TECHNIQUE:   Portable chest radiograph.     FINDINGS:    The cardiomediastinal silhouette is within normal limits. The lungs are  clear. There is no pneumothorax, focal consolidation, or large pleural  effusion. Osseous structures grossly intact. Left-sided AICD noted.       Impression:      No acute process.      Electronically Signed By-Valente Hoover MD On:7/5/2022 11:41 AM  This report was finalized on [id number] by  Valente Hoover MD.            ASSESSMENT AND PLAN:    Melena  Normocytic anemia with drop in hemoglobin  Chest/epigastric pain  Nausea  Elevated alk phos  Fatty liver disease  CAD/PCI on Eliquis and aspirin  COPD  Pacemaker  Diabetes  Chronic kidney disease  Large abdominal hernias with history of surgery    Active Problems:    Unstable angina (HCC)     Plan:  76-year-old male presented to the hospital with chest pain  however this has resolved.  He complains today of epigastric pain and black stools for 3 days.  Hemoglobin on June 21, 2022 was 12.3 and dropped as low 7.4.  He received a blood transfusion and hemoglobin today is 8.2.  Concern for peptic ulcer disease versus AVM.  Plan to proceed with EGD today for further evaluation.  Due to reported allergy to pantoprazole, patient is currently receiving famotidine twice daily.  Monitor H&H and transfuse as needed.  Eliquis and aspirin are currently being held.  Recommend outpatient follow-up as patient was due for colonoscopy in 2020 for his history of colon polyps.    I discussed the patients findings and my recommendations with the patient.  Hermelinda Frank, KOURTNEY  07/06/22  09:37 EDT

## 2022-07-06 NOTE — ANESTHESIA POSTPROCEDURE EVALUATION
Patient: Ro Nathan    Procedure Summary     Date: 07/06/22 Room / Location: [facility] ENDOSCOPY 1 / [facility] ENDOSCOPY    Anesthesia Start: 1415 Anesthesia Stop: 1449    Procedure: ESOPHAGOGASTRODUODENOSCOPY with biopsy x 1 area. bicap cautery, scelero therapy. (N/A ) Diagnosis:       Upper GI bleed      (Upper GI bleed [K92.2])    Surgeons: Douglas Delgadillo MD Provider: Zechariah Oquendo MD    Anesthesia Type: MAC ASA Status: 4          Anesthesia Type: MAC    Vitals  Vitals Value Taken Time   /50 07/06/22 1515   Temp     Pulse 77 07/06/22 1515   Resp 15 07/06/22 1515   SpO2 92 % 07/06/22 1521           Post Anesthesia Care and Evaluation    Patient location during evaluation: PACU  Patient participation: complete - patient participated  Level of consciousness: awake  Pain score: 0  Pain management: adequate    Airway patency: patent  Anesthetic complications: No anesthetic complications  PONV Status: none  Cardiovascular status: acceptable  Respiratory status: acceptable  Hydration status: acceptable    Comments: Patient seen and examined postoperatively; vital signs stable; SpO2 greater than or equal to 90%; cardiopulmonary status stable; nausea/vomiting adequately controlled; pain adequately controlled; no apparent anesthesia complications; patient discharged from anesthesia care when discharge criteria were met

## 2022-07-06 NOTE — NURSING NOTE
Spoke to Dr. Jones regarding stomach pain and breathing trouble. I also let him know that I can't complete PTA meds and he stated that's okay, he doesn't need any medication tonight. Ordered a KUB for abd pain and breathing treatments.

## 2022-07-06 NOTE — NURSING NOTE
Patient complained of epigastric pain rated at 10 as well as nausea. One time orders for pain medication and antiemetic received and administered.

## 2022-07-07 ENCOUNTER — ON CAMPUS - OUTPATIENT (OUTPATIENT)
Dept: URBAN - METROPOLITAN AREA HOSPITAL 85 | Facility: HOSPITAL | Age: 77
End: 2022-07-07

## 2022-07-07 DIAGNOSIS — D64.9 ANEMIA, UNSPECIFIED: ICD-10-CM

## 2022-07-07 DIAGNOSIS — K76.0 FATTY (CHANGE OF) LIVER, NOT ELSEWHERE CLASSIFIED: ICD-10-CM

## 2022-07-07 DIAGNOSIS — E11.22 TYPE 2 DIABETES MELLITUS WITH DIABETIC CHRONIC KIDNEY DISEAS: ICD-10-CM

## 2022-07-07 DIAGNOSIS — R74.8 ABNORMAL LEVELS OF OTHER SERUM ENZYMES: ICD-10-CM

## 2022-07-07 DIAGNOSIS — R11.0 NAUSEA: ICD-10-CM

## 2022-07-07 DIAGNOSIS — N18.9 CHRONIC KIDNEY DISEASE, UNSPECIFIED: ICD-10-CM

## 2022-07-07 DIAGNOSIS — K92.1 MELENA: ICD-10-CM

## 2022-07-07 DIAGNOSIS — R10.13 EPIGASTRIC PAIN: ICD-10-CM

## 2022-07-07 PROBLEM — I50.23 ACUTE ON CHRONIC SYSTOLIC HEART FAILURE (HCC): Status: ACTIVE | Noted: 2022-01-01

## 2022-07-07 PROCEDURE — 99212 OFFICE O/P EST SF 10 MIN: CPT | Performed by: NURSE PRACTITIONER

## 2022-07-07 NOTE — PLAN OF CARE
Problem: Fall Injury Risk  Goal: Absence of Fall and Fall-Related Injury  Intervention: Identify and Manage Contributors  Recent Flowsheet Documentation  Taken 7/7/2022 0400 by Jose Linares RN  Medication Review/Management:   medications reviewed   high-risk medications identified  Taken 7/7/2022 0100 by Jose Linares RN  Medication Review/Management:   medications reviewed   high-risk medications identified  Taken 7/6/2022 2100 by Jose Linares RN  Medication Review/Management:   medications reviewed   high-risk medications identified  Intervention: Promote Injury-Free Environment  Recent Flowsheet Documentation  Taken 7/7/2022 0400 by Jose Linares RN  Safety Promotion/Fall Prevention:   assistive device/personal items within reach   clutter free environment maintained   fall prevention program maintained   nonskid shoes/slippers when out of bed   room organization consistent   safety round/check completed  Taken 7/7/2022 0100 by Jose Linares RN  Safety Promotion/Fall Prevention:   assistive device/personal items within reach   clutter free environment maintained   fall prevention program maintained   nonskid shoes/slippers when out of bed   room organization consistent   safety round/check completed  Taken 7/6/2022 2100 by Jose Linares RN  Safety Promotion/Fall Prevention:   assistive device/personal items within reach   clutter free environment maintained   fall prevention program maintained   nonskid shoes/slippers when out of bed   room organization consistent   safety round/check completed     Problem: Adult Inpatient Plan of Care  Goal: Absence of Hospital-Acquired Illness or Injury  Intervention: Identify and Manage Fall Risk  Recent Flowsheet Documentation  Taken 7/7/2022 0400 by Jose Linares RN  Safety Promotion/Fall Prevention:   assistive device/personal items within reach   clutter free environment maintained   fall prevention program maintained   nonskid  shoes/slippers when out of bed   room organization consistent   safety round/check completed  Taken 7/7/2022 0100 by Jose Linares RN  Safety Promotion/Fall Prevention:   assistive device/personal items within reach   clutter free environment maintained   fall prevention program maintained   nonskid shoes/slippers when out of bed   room organization consistent   safety round/check completed  Taken 7/6/2022 2100 by Jose Linares RN  Safety Promotion/Fall Prevention:   assistive device/personal items within reach   clutter free environment maintained   fall prevention program maintained   nonskid shoes/slippers when out of bed   room organization consistent   safety round/check completed  Intervention: Prevent Skin Injury  Recent Flowsheet Documentation  Taken 7/7/2022 0400 by Jose Linares RN  Body Position: position changed independently  Skin Protection:   adhesive use limited   incontinence pads utilized  Taken 7/7/2022 0100 by Jose Linares RN  Body Position: position changed independently  Taken 7/6/2022 2100 by Jose Linares RN  Body Position: position changed independently  Skin Protection:   adhesive use limited   incontinence pads utilized  Intervention: Prevent and Manage VTE (Venous Thromboembolism) Risk  Recent Flowsheet Documentation  Taken 7/7/2022 0400 by Jose Linares RN  Activity Management: activity adjusted per tolerance  Taken 7/7/2022 0100 by Jose Linares RN  Activity Management: activity adjusted per tolerance  VTE Prevention/Management:   bilateral   sequential compression devices off   patient refused intervention  Taken 7/6/2022 2100 by Jose Linares RN  Activity Management: activity adjusted per tolerance  VTE Prevention/Management:   bilateral   sequential compression devices off   patient refused intervention  Intervention: Prevent Infection  Recent Flowsheet Documentation  Taken 7/7/2022 0400 by Jose Linares RN  Infection  Prevention:   environmental surveillance performed   hand hygiene promoted   rest/sleep promoted   single patient room provided  Taken 7/6/2022 2100 by Jose Linares RN  Infection Prevention:   environmental surveillance performed   hand hygiene promoted   rest/sleep promoted   single patient room provided  Goal: Optimal Comfort and Wellbeing  Intervention: Monitor Pain and Promote Comfort  Recent Flowsheet Documentation  Taken 7/7/2022 0222 by Jose Linares RN  Pain Management Interventions: see MAR  Taken 7/6/2022 2118 by Jose Lianres RN  Pain Management Interventions: see MAR  Intervention: Provide Person-Centered Care  Recent Flowsheet Documentation  Taken 7/7/2022 0400 by Jose Linares RN  Trust Relationship/Rapport:   care explained   choices provided   questions answered   questions encouraged  Taken 7/7/2022 0100 by Jose Linares RN  Trust Relationship/Rapport:   care explained   choices provided   questions answered   questions encouraged  Taken 7/6/2022 2100 by Jose Linares RN  Trust Relationship/Rapport:   care explained   choices provided   questions answered   questions encouraged     Problem: COPD (Chronic Obstructive Pulmonary Disease) Comorbidity  Goal: Maintenance of COPD Symptom Control  Intervention: Maintain COPD-Symptom Control  Recent Flowsheet Documentation  Taken 7/7/2022 0400 by Jose Linares RN  Medication Review/Management:   medications reviewed   high-risk medications identified  Taken 7/7/2022 0100 by Jose Linares RN  Medication Review/Management:   medications reviewed   high-risk medications identified  Taken 7/6/2022 2100 by Jose Linares RN  Medication Review/Management:   medications reviewed   high-risk medications identified     Problem: Heart Failure Comorbidity  Goal: Maintenance of Heart Failure Symptom Control  Intervention: Maintain Heart Failure-Management  Recent Flowsheet Documentation  Taken 7/7/2022 0400 by  Jose Linares RN  Medication Review/Management:   medications reviewed   high-risk medications identified  Taken 7/7/2022 0100 by Jose Linares RN  Medication Review/Management:   medications reviewed   high-risk medications identified  Taken 7/6/2022 2100 by Jose Linares RN  Medication Review/Management:   medications reviewed   high-risk medications identified     Problem: Hypertension Comorbidity  Goal: Blood Pressure in Desired Range  Intervention: Maintain Blood Pressure Management  Recent Flowsheet Documentation  Taken 7/7/2022 0400 by Jose Linares RN  Medication Review/Management:   medications reviewed   high-risk medications identified  Taken 7/7/2022 0100 by Jose Linares RN  Medication Review/Management:   medications reviewed   high-risk medications identified  Taken 7/6/2022 2100 by Jose Linares RN  Medication Review/Management:   medications reviewed   high-risk medications identified     Problem: Pain Chronic (Persistent) (Comorbidity Management)  Goal: Acceptable Pain Control and Functional Ability  Intervention: Manage Persistent Pain  Recent Flowsheet Documentation  Taken 7/7/2022 0400 by Jose Linares RN  Medication Review/Management:   medications reviewed   high-risk medications identified  Taken 7/7/2022 0100 by Jose Linares RN  Medication Review/Management:   medications reviewed   high-risk medications identified  Taken 7/6/2022 2100 by Jose Linares RN  Medication Review/Management:   medications reviewed   high-risk medications identified  Intervention: Develop Pain Management Plan  Recent Flowsheet Documentation  Taken 7/7/2022 0222 by Jose Linares RN  Pain Management Interventions: see MAR  Taken 7/6/2022 2118 by Jose Linares RN  Pain Management Interventions: see MAR  Intervention: Optimize Psychosocial Wellbeing  Recent Flowsheet Documentation  Taken 7/6/2022 2100 by Jose Linares RN  Diversional Activities:  puzzles   Goal Outcome Evaluation:

## 2022-07-07 NOTE — PROGRESS NOTES
LOS: 0 days   Patient Care Team:  Yusuf Jones MD as PCP - General  PhiJoshua MD as Consulting Physician (Cardiology)  Moise Watson MD as Consulting Physician (Cardiac Electrophysiology)  Izyz Alvarez MD as Consulting Physician (Nephrology)  Celine Swanson MD as Consulting Physician (Cardiology)    Subjective:  Sl nausea    Objective:   afebrile      Review of Systems:   Review of Systems   Constitutional: Positive for activity change.   Respiratory: Negative.    Cardiovascular: Negative.        Vital Signs  Temp:  [97.5 °F (36.4 °C)-98.6 °F (37 °C)] 98.1 °F (36.7 °C)  Heart Rate:  [69-84] 71  Resp:  [11-27] 27  BP: (108-150)/(52-66) 108/52    Physical Exam:  Physical Exam  Constitutional:       Appearance: Normal appearance.   Cardiovascular:      Rate and Rhythm: Normal rate.      Heart sounds: Normal heart sounds.   Pulmonary:      Breath sounds: Normal breath sounds.   Abdominal:      General: There is distension.      Tenderness: There is no guarding or rebound.      Hernia: A hernia is present.   Neurological:      Mental Status: He is alert.          Radiology:  XR Chest 1 View    Result Date: 7/5/2022  No acute process.  Electronically Signed By-Valente Hoover MD On:7/5/2022 11:41 AM This report was finalized on [id number] by  Valente Hoover MD.    XR Abdomen KUB    Result Date: 7/5/2022  1. Nonspecific bowel gas pattern. Moderate gas-filled colonic distention. Overall mild chronic stool burden. Recommend radiographic follow-up or further assessment with CT if patient's symptoms do not improve.  Electronically Signed By-Jacques To MD On:7/5/2022 10:25 PM This report was finalized on [id number] by  Jacques oT MD.         Results Review:     I reviewed the patient's new clinical results.  I reviewed the patient's new imaging results and agree with the interpretation.    Medication Review:   Scheduled Meds:atorvastatin, 40 mg, Oral, QAM  docusate  sodium, 200 mg, Oral, BID  famotidine, 40 mg, Oral, BID AC  furosemide, 40 mg, Oral, QAM  glipizide, 5 mg, Oral, QAM AC  ipratropium-albuterol, 3 mL, Nebulization, TID - RT  isosorbide mononitrate, 120 mg, Oral, Q24H  lisinopril, 2.5 mg, Oral, Daily  [START ON 7/8/2022] metOLazone, 5 mg, Oral, Once per day on Mon Wed Fri  metoprolol tartrate, 25 mg, Oral, Q12H  mirtazapine, 30 mg, Oral, Nightly  pregabalin, 150 mg, Oral, BID  ranolazine, 500 mg, Oral, BID  sodium chloride, 3 mL, Intravenous, Q12H  sucralfate, 1 g, Oral, 4x Daily AC & at Bedtime      Continuous Infusions:   PRN Meds:.•  acetaminophen  •  budesonide  •  hydrALAZINE  •  HYDROmorphone  •  nitroglycerin  •  ondansetron **OR** ondansetron  •  oxyCODONE  •  [COMPLETED] Insert peripheral IV **AND** sodium chloride  •  sodium chloride    Labs:    CBC    Results from last 7 days   Lab Units 07/07/22  1331 07/06/22 2302 07/06/22 0135 07/05/22 2003 07/05/22  1055   WBC 10*3/mm3  --  6.70 7.00  --  5.90   HEMOGLOBIN g/dL 8.4* 8.6* 8.2* 8.6* 7.4*   PLATELETS 10*3/mm3  --  270 256  --  274     BMP   Results from last 7 days   Lab Units 07/06/22  2302 07/06/22 0135 07/05/22  1055   SODIUM mmol/L 139 138 139   POTASSIUM mmol/L 4.1 4.1 4.2   CHLORIDE mmol/L 102 102 103   CO2 mmol/L 26.0 25.0 24.0   BUN mg/dL 22 27* 31*   CREATININE mg/dL 1.11 1.23 1.34*   GLUCOSE mg/dL 230* 219* 197*     Cr Clearance Estimated Creatinine Clearance: 79.2 mL/min (by C-G formula based on SCr of 1.11 mg/dL).  Coag     HbA1C   Lab Results   Component Value Date    HGBA1C 8.5 (H) 06/02/2022    HGBA1C 8.5 (H) 04/19/2022    HGBA1C 6.7 (H) 09/28/2021     Blood Glucose   Glucose   Date/Time Value Ref Range Status   07/07/2022 1623 186 (H) 70 - 105 mg/dL Final     Comment:     Serial Number: [id number]:  [id number]   07/07/2022 1100 163 (H) 70 - 105 mg/dL Final     Comment:     Serial Number: [id number]:  [id number]   07/07/2022 0724 153 (H) 70 - 105 mg/dL Final     Comment:      Serial Number: [id number]:  [id number]   07/06/2022 2121 243 (H) 70 - 105 mg/dL Final     Comment:     Serial Number: [id number]:  [id number]   07/06/2022 1631 190 (H) 70 - 105 mg/dL Final     Comment:     Serial Number: [id number]:  [id number]   07/06/2022 1142 162 (H) 70 - 105 mg/dL Final     Comment:     Serial Number: [id number]:  [id number]   07/06/2022 0729 171 (H) 70 - 105 mg/dL Final     Comment:     Serial Number: [id number]:  [id number]   07/05/2022 2016 190 (H) 70 - 105 mg/dL Final     Comment:     Serial Number: [id number]:  [id number]     Infection     CMP   Results from last 7 days   Lab Units 07/06/22  2302 07/06/22  0135 07/05/22  1055   SODIUM mmol/L 139 138 139   POTASSIUM mmol/L 4.1 4.1 4.2   CHLORIDE mmol/L 102 102 103   CO2 mmol/L 26.0 25.0 24.0   BUN mg/dL 22 27* 31*   CREATININE mg/dL 1.11 1.23 1.34*   GLUCOSE mg/dL 230* 219* 197*   ALBUMIN g/dL 3.20*  --  3.20*   BILIRUBIN mg/dL 0.9  --  0.6   ALK PHOS U/L 161*  --  154*   AST (SGOT) U/L 16  --  9   ALT (SGPT) U/L 8  --  11     UA      Radiology(recent) XR Abdomen KUB    Result Date: 7/5/2022  1. Nonspecific bowel gas pattern. Moderate gas-filled colonic distention. Overall mild chronic stool burden. Recommend radiographic follow-up or further assessment with CT if patient's symptoms do not improve.  Electronically Signed By-Jacques To MD On:7/5/2022 10:25 PM This report was finalized on [id number] by  Jacques To MD.     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

## 2022-07-07 NOTE — CONSULTS
Diabetes Education    Patient Name:  Ro Nathan  YOB: 1945  MRN: [id number]  Admit Date:  7/5/2022    Pt seen due to A1c of 8.5% on 6/2/2022. Pt was seen by inpatient diabetes educator at [facility] on 4/22/2022. Pt has bs meter and checking bs 2 times/day. Pt states bs meter about 7-8  months old. Discussed importance of recording bs and taking to MD visits. Discussed A1c result from June of 8.5%. Reviewed healthy bs range and healthy A1c target. Discussed importance of bs control. Encouraged pt to report bs to MD when running outside healthy range. Pt taking glimepiride 2 mg am. Discussed he may need different dosage of glimepiride or added medication to help with bs control. Pt eating 3 times/day. Pt states he does not have issues with low bs. Reviewed signs/symptoms and tx. Gave A1c info sheet. Pt with no additional questions.       Electronically signed by:  Melissa Castro RN  07/07/22 15:58 EDT

## 2022-07-07 NOTE — PROGRESS NOTES
LOS: 0 days   Patient Care Team:  Yusuf Jones MD as PCP - General  PhiJoshua MD as Consulting Physician (Cardiology)  Moise Watson MD as Consulting Physician (Cardiac Electrophysiology)  Izyz Alvarez MD as Consulting Physician (Nephrology)  Celine Swanson MD as Consulting Physician (Cardiology)      Subjective     Subjective: Patient reports feeling much better today.  No further abdominal pain and no black stools.  He tolerated solid food this morning.      ROS:   Review of Systems   Constitutional: Negative for chills and fever.   Respiratory: Negative for cough and shortness of breath.    Cardiovascular: Negative for chest pain and palpitations.   Gastrointestinal: Negative for abdominal pain, blood in stool, nausea and vomiting.   Psychiatric/Behavioral: Negative for agitation and confusion.        Medication Review:   Scheduled Meds:atorvastatin, 40 mg, Oral, QAM  docusate sodium, 200 mg, Oral, BID  famotidine, 40 mg, Oral, BID AC  furosemide, 40 mg, Oral, QAM  glipizide, 5 mg, Oral, QAM AC  ipratropium-albuterol, 3 mL, Nebulization, TID - RT  isosorbide mononitrate, 120 mg, Oral, Q24H  lisinopril, 2.5 mg, Oral, Daily  [START ON 7/8/2022] metOLazone, 5 mg, Oral, Once per day on Mon Wed Fri  metoprolol tartrate, 25 mg, Oral, Q12H  mirtazapine, 30 mg, Oral, Nightly  pregabalin, 150 mg, Oral, BID  ranolazine, 500 mg, Oral, BID  sodium chloride, 3 mL, Intravenous, Q12H  sucralfate, 1 g, Oral, 4x Daily AC & at Bedtime      Continuous Infusions:   PRN Meds:.•  acetaminophen  •  budesonide  •  hydrALAZINE  •  HYDROmorphone  •  nitroglycerin  •  ondansetron **OR** ondansetron  •  oxyCODONE  •  [COMPLETED] Insert peripheral IV **AND** sodium chloride  •  sodium chloride      Objective     Vital Signs  Temp:  [97.5 °F (36.4 °C)-98.6 °F (37 °C)] 97.5 °F (36.4 °C)  Heart Rate:  [69-84] 70  Resp:  [11-19] 14  BP: (116-155)/(48-66) 116/54    Physical Exam:    General Appearance:     Awake and alert, in no acute distress   Head:    Normocephalic, without obvious abnormality   Eyes:          Conjunctivae normal, anicteric sclera   Ears:    Hearing intact   Throat:   No oral lesions, no thrush, oral mucosa moist   Neck:   No adenopathy, supple, no JVD   Lungs:     Respirations regular, even and unlabored       Abdomen:     Soft, non-tender, no rebound or guarding, non-distended, no hepatosplenomegaly, large reducible ventral hernias   Rectal:     Deferred   Extremities:   No edema, no cyanosis, no redness   Skin:   No bleeding, bruising or rash, no jaundice   Neurologic:   Sensation intact        Results Review:    CBC  Results from last 7 days   Lab Units 07/06/22 2302 07/06/22 0135 07/05/22 2003 07/05/22  1055   RBC 10*6/mm3 3.12* 2.91*  --  2.71*   WBC 10*3/mm3 6.70 7.00  --  5.90   HEMOGLOBIN g/dL 8.6* 8.2* 8.6* 7.4*   PLATELETS 10*3/mm3 270 256  --  274       CMP  Results from last 7 days   Lab Units 07/06/22 2302 07/06/22 0135 07/05/22  1055   SODIUM mmol/L 139 138 139   POTASSIUM mmol/L 4.1 4.1 4.2   CHLORIDE mmol/L 102 102 103   CO2 mmol/L 26.0 25.0 24.0   BUN mg/dL 22 27* 31*   CREATININE mg/dL 1.11 1.23 1.34*   GLUCOSE mg/dL 230* 219* 197*   ALBUMIN g/dL 3.20*  --  3.20*   BILIRUBIN mg/dL 0.9  --  0.6   ALK PHOS U/L 161*  --  154*   AST (SGOT) U/L 16  --  9   ALT (SGPT) U/L 8  --  11       Amylase and Lipase        CRP         Imaging Results (Last 24 Hours)     ** No results found for the last 24 hours. **            Assessment & Plan     Melena  Normocytic anemia with drop in hemoglobin  Chest/epigastric pain  Nausea  Elevated alk phos  Fatty liver disease  CAD/PCI on Eliquis and aspirin  COPD  Pacemaker  Diabetes  Chronic kidney disease  Large abdominal hernias with history of surgery        Plan:  Status post EGD 7/6/2022 by Dr. Delgadillo with 1 cm cratered duodenal ulcer with visible vessel injected with epinephrine and cauterized.  Also erosive gastritis and grade B  esophagitis seen.    Patient reports feeling better today.  No abdominal pain or nausea/vomiting.  No further black stools.  Hemoglobin 8.6 as of yesterday.  Plan to check hemoglobin today.  As long as this is stable, he can be discharged home from GI standpoint.  Recommend continuing on Pepcid twice daily due to allergy on pantoprazole.  Also continue Carafate at home.  Avoid NSAIDs.  He can follow-up in our office as he will need colonoscopy in the near future due to history of polyps.    Hermelinda Frank, KOURTNEY  07/07/22  13:15 EDT

## 2022-07-07 NOTE — NURSING NOTE
Patient had lunch and has been very nauseous. Call placed to Dr Jones who stated to monitor the patient and if he feels better around dinner time, he could still discharge today. Patient agreeable to plan

## 2022-07-07 NOTE — OUTREACH NOTE
Medical Week 1 Survey      Responses   [facility] patient discharged from?  [facility]   COVID-19 Test Status  Negative   Does the patient have one of the following disease processes/diagnoses(primary or secondary)?  Other   Is there a successful TCM telephone encounter documented?  No   Week 1 attempt successful?  No   Unsuccessful attempts  Attempt 1          Lali Sahu LPN  
FAMILY HISTORY:  No pertinent family history in first degree relatives

## 2022-07-08 NOTE — DISCHARGE SUMMARY
Date of Discharge:  7/8/2022    Discharge Diagnosis:     Acute duodenal bulb ulceration with identified sentinel vessel status post EGD with injection and gold probe cautery 7/6/2022  Peptic ulcer disease with 4 mm antral ulcer  Acute erosive gastritis  Grade B esophagitis  Gastroesophageal reflux disease with esophagitis  Known history of Herbert's esophageal change  AGIB  Acute anemia  SSCP worrisome for [place]  DM II with renal manifestations  DMII with angiopathic manifestations  DMII with neuropathic manifestations  Diabetic dyslipidemia  Panlobular COPD with emphysema  Nicotine dependency with nicotine use disorder, cigarettes  DDD L/S  Large ventral hernia, inoperable  CKD II  HTN associated with CKD II  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  Cirrhosis  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, MOSD  History of right renal mass  3.4 cm infrarenal abdominal aortic aneurysm  Valvular heart disease with significant mitral regurgitation  Paroxysmal atrial fibrillation  Hypercoagulable state secondary to atrial fibrillation  Chronic oral anticoagulation therapy with direct thrombin inhibition  ROS  Chronic systolic congestive heart failure  Chronic hypoxic respiratory failure  Supplemental oxygen dependency  Chronic mucopurulent bronchitis  History of prostate cancer  Morbid exogenous obesity  Hypoventilation apnea syndrome with ARTI  Peripheral polyneuropathy secondary to alcohol and diabetes  Autonomic neuropathy secondary to diabetes with autonomic dysfunction syndrome  Monoclonal paraproteinemia  Narcotic dependency  Vitamin D deficiency  ETOHISM  H/O pacemaker placement      Presenting Problem/History of Present Illness  Active Hospital Problems    Diagnosis  POA   • **Upper GI bleed [K92.2]  Yes   • Acute on chronic systolic heart failure (CMS/HCC) [I50.23]  Unknown   • Unstable angina (HCC) [I20.0]  Yes      Resolved Hospital Problems   No resolved problems to display.          Hospital  Course  Patient is a 76 y.o. male who presented with acute substernal chest pain as well as some midepigastric abdominal pain and nausea.  The patient was admitted and evaluated by cardiology and his recent history was reviewed quite closely.  He was also seen by gastroenterology as he was found to have some acute GI bleeding and underwent an EGD with identification of an acute peptic ulcer with acute on chronic erosive esophagitis.  He will be sent home with Carafate and H2 blockade as he does have listed in the chart as an allergy to PPI therapy.  Given his history of Herbert's esophageal change I remain quite concerned and will begin a very close outpatient trial of PPI therapy as the patient is not aware of any such allergy      Procedures Performed    Procedure(s):  ESOPHAGOGASTRODUODENOSCOPY with biopsy x 1 area. bicap cautery, scelero therapy.  -------------------  07/06 1414 UPPER GI ENDOSCOPY    Consults:   Consults     Date and Time Order Name Status Description    7/5/2022  1:57 PM Inpatient Gastroenterology Consult      7/5/2022 11:45 AM Hospitalist (on-call MD unless specified)      6/15/2022  7:49 AM Inpatient Pulmonology Consult Completed           Pertinent Test Results:XR Chest 1 View    Result Date: 7/5/2022  No acute process.  Electronically Signed By-Valente Hoover MD On:7/5/2022 11:41 AM This report was finalized on [id number] by  Valente Hoover MD.    XR Abdomen KUB    Result Date: 7/5/2022  1. Nonspecific bowel gas pattern. Moderate gas-filled colonic distention. Overall mild chronic stool burden. Recommend radiographic follow-up or further assessment with CT if patient's symptoms do not improve.  Electronically Signed By-Jacques To MD On:7/5/2022 10:25 PM This report was finalized on [id number] by  Jacques To MD.      Imaging Results (Last 7 Days)     Procedure Component Value Units Date/Time    XR Abdomen KUB [[id number]] Collected: 07/05/22 2222     Updated: 07/05/22 2227     Narrative:      DATE OF EXAM:  7/5/2022 9:58 PM     PROCEDURE:  XR ABDOMEN KUB-     INDICATIONS:  Abdominal pain; I20.0-Unstable angina; K92.2-Gastrointestinal  hemorrhage, unspecified; D62-Acute posthemorrhagic anemia     COMPARISON:  KUB dated 6/8/2015     TECHNIQUE:   Single radiographic view of the abdomen was obtained.     FINDINGS:  The lung bases are clear. There are right upper quadrant cholecystectomy  clips. There are no visible renal calcifications. There is gaseous  distention of the colon. There is no definite small bowel distention.  There is a small rectal stool burden. There is a moderate right colonic  stool burden. There are stable linear radiopaque foreign bodies  projecting at the pelvis.        Impression:      1. Nonspecific bowel gas pattern. Moderate gas-filled colonic  distention. Overall mild chronic stool burden. Recommend radiographic  follow-up or further assessment with CT if patient's symptoms do not  improve.     Electronically Signed By-Jacques To MD On:7/5/2022 10:25 PM  This report was finalized on [id number] by  Jacques To MD.    XR Chest 1 View [[id number]] Collected: 07/05/22 1140     Updated: 07/05/22 1143    Narrative:         DATE OF EXAM:   7/5/2022 11:12 AM     PROCEDURE:   XR CHEST 1 VW-     INDICATIONS:   soa     COMPARISON:  06/15/2022     TECHNIQUE:   Portable chest radiograph.     FINDINGS:    The cardiomediastinal silhouette is within normal limits. The lungs are  clear. There is no pneumothorax, focal consolidation, or large pleural  effusion. Osseous structures grossly intact. Left-sided AICD noted.       Impression:      No acute process.      Electronically Signed By-Valente Hoover MD On:7/5/2022 11:41 AM  This report was finalized on [id number] by  Valente Hoover MD.              Condition on Discharge:  Fair    Vital Signs  Temp:  [96.8 °F (36 °C)-98.2 °F (36.8 °C)] 98 °F (36.7 °C)  Heart Rate:  [69-83] 69  Resp:  [14-27] 18  BP: ()/(48-89)  108/89    Physical Exam:     General Appearance:    Alert, cooperative, in no acute distress   Head:    Normocephalic, without obvious abnormality, atraumatic   Eyes:           Conjunctivae and sclerae normal, no   icterus, no pallor, corneas clear, PERRLA   Throat:   No oral lesions, no thrush, oral mucosa moist   Neck:   No adenopathy, supple, trachea midline, no thyromegaly, no   carotid bruit, no JVD   Lungs:     Clear to auscultation,respirations regular, even and                  unlabored    Heart:    Regular rhythm and normal rate, normal S1 and S2, no            murmur, no gallop, no rub, no click   Chest Wall:    No abnormalities observed   Abdomen:     Normal bowel sounds, no masses, no organomegaly, soft        non-tender, non-distended, no guarding, no rebound                tenderness   Rectal:     Deferred   Extremities:   Moves all extremities well, no edema, no cyanosis, no             redness   Pulses:   Pulses palpable and equal bilaterally   Skin:   No bleeding, bruising or rash   Lymph nodes:   No palpable adenopathy   Neurologic:   Cranial nerves 2 - 12 grossly intact, sensation intact, DTR       present and equal bilaterally         Discharge Disposition  Home or Self Care    Discharge Medications     Discharge Medications      New Medications      Instructions Start Date   sucralfate 1 g tablet  Commonly known as: CARAFATE   1 g, Oral, 4 Times Daily Before Meals & Nightly         Changes to Medications      Instructions Start Date   aspirin 81 MG EC tablet  What changed: when to take this   81 mg, Oral, 3 Times Weekly         Continue These Medications      Instructions Start Date   apixaban 5 MG tablet tablet  Commonly known as: ELIQUIS   5 mg, Oral, 2 Times Daily      atorvastatin 40 MG tablet  Commonly known as: LIPITOR   40 mg, Oral, Every Morning      budesonide 0.5 MG/2ML nebulizer solution  Commonly known as: PULMICORT   0.5 mg, Nebulization, 2 Times Daily PRN      docusate sodium 100  MG capsule  Commonly known as: COLACE   200 mg, Oral, 2 Times Daily      famotidine 20 MG tablet  Commonly known as: PEPCID   20 mg, Oral, 2 Times Daily Before Meals      furosemide 40 MG tablet  Commonly known as: LASIX   40 mg, Oral, Every Morning      glimepiride 2 MG tablet  Commonly known as: AMARYL   2 mg, Oral, Every Morning Before Breakfast      ipratropium-albuterol 0.5-2.5 mg/3 ml nebulizer  Commonly known as: DUO-NEB   3 mL, Nebulization, 3 Times Daily - RT      isosorbide mononitrate 120 MG 24 hr tablet  Commonly known as: IMDUR   120 mg, Oral, Every 24 Hours Scheduled      lisinopril 2.5 MG tablet  Commonly known as: Zestril   2.5 mg, Oral, Daily      metOLazone 5 MG tablet  Commonly known as: ZAROXOLYN   5 mg, Oral, 3 Times Weekly, Monday, Wednesday, Friday      metoprolol tartrate 25 MG tablet  Commonly known as: LOPRESSOR   25 mg, Oral, 2 Times Daily      mirtazapine 30 MG tablet  Commonly known as: REMERON   30 mg, Oral, Nightly      nitroglycerin 0.4 MG SL tablet  Commonly known as: NITROSTAT   0.4 mg, Sublingual, Every 5 Minutes PRN      oxyCODONE-acetaminophen  MG per tablet  Commonly known as: Percocet   1 tablet, Oral, Every 6 Hours PRN      pregabalin 150 MG capsule  Commonly known as: LYRICA   150 mg, Oral, 2 Times Daily      ranolazine 500 MG 12 hr tablet  Commonly known as: RANEXA   500 mg, Oral, 2 Times Daily      rOPINIRole 0.5 MG tablet  Commonly known as: REQUIP   0.5 mg, Oral, Nightly, Take 1 hour before bedtime.      traZODone 100 MG tablet  Commonly known as: DESYREL   100 mg, Oral, Nightly      Ventolin  (90 Base) MCG/ACT inhaler  Generic drug: albuterol sulfate HFA   2 puffs, Inhalation, Every 6 Hours PRN      vitamin D 1.25 MG (43275 UT) capsule capsule  Commonly known as: ERGOCALCIFEROL   50,000 Units, Oral, Weekly, Friday         Stop These Medications    benzonatate 100 MG capsule  Commonly known as: TESSALON            Discharge Diet:   Cardiac  Activity at  Discharge:   As tolerated  He will follow-up with us within 1 week for transitional care visit  He will follow-up with gastroenterology within 1 month  Follow-up Appointments  Future Appointments   Date Time Provider Department Center   7/8/2022  1:15 PM  [facility]  [facility] None   7/14/2022 10:30 AM Dandy Christopher, PT [facility]   7/15/2022  9:00 AM Herberth Oconnor MD [facility]   8/30/2022 11:00 AM [facility] DEVICE CHECK [facility]   8/30/2022 11:40 AM Celine Swanson MD [facility]         Test Results Pending at Discharge       Yusuf Jones MD  07/08/22  07:37 EDT

## 2022-07-08 NOTE — CASE MANAGEMENT/SOCIAL WORK
Continued Stay Note   [facility]     Patient Name: Ro Nathan  MRN: [id number]  Today's Date: 7/8/2022    Admit Date: 7/5/2022     Discharge Plan     Row Name 07/08/22 1046       Plan    Plan DC Home w/[facility]. Lives w/spouse.    Plan Comments DC Home. Current w/[facility]. has glucometer, nebulizer & RW. 02 2L prn (unsure of DME provider).                    Expected Discharge Date and Time     Expected Discharge Date Expected Discharge Time    Jul 8, 2022         Connie Baxter RN, MSN  Care Manager  [phone]

## 2022-07-08 NOTE — PLAN OF CARE
Goal Outcome Evaluation:      Patient to be discharged today late afternoon.  Vitals stable. No changes this shift. Back pain 7/10. Patient has pain medicine available at home.

## 2022-07-09 NOTE — OUTREACH NOTE
Prep Survey    Flowsheet Row Responses   Religion facility patient discharged from? [facility]   Is LACE score < 7 ? No   Emergency Room discharge w/ pulse ox? No   Eligibility Readm Mgmt   Discharge diagnosis Upper GI bleed   Does the patient have one of the following disease processes/diagnoses(primary or secondary)? CHF   Does the patient have Home health ordered? Yes   What [facility]   Is there a DME ordered? No   Prep survey completed? Yes          MUNIRA VILLASENOR - Registered Nurse

## 2022-07-11 NOTE — CASE MANAGEMENT/SOCIAL WORK
Case Management Discharge Note      Final Note: DC Home w[facility]    Provided Post Acute Provider List?: N/A  Provided Post Acute Provider Quality & Resource List?: N/A        Home Medical Care Coordination complete.    Service Provider Selected Services Address Phone Fax Patient Preferred    [facility] [address] [place] [address] [phone] --                      Transportation Services  Private: Car (spouse)    Final Discharge Disposition Code: 06 - home with home health care     Connie Baxter RN, MSN  Care Manager  [phone]

## 2022-07-13 NOTE — OUTREACH NOTE
CHF Week 1 Survey    Flowsheet Row Responses   [facility] facility patient discharged from? [facility]   Does the patient have one of the following disease processes/diagnoses(primary or secondary)? CHF   CHF Week 1 attempt successful? No   Unsuccessful attempts Attempt 1  [All numbers attempted- no answer]          MARIANA H - Registered Nurse
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
## 2022-07-25 NOTE — OUTREACH NOTE
CHF Week 3 Survey    Flowsheet Row Responses   Religion facility patient discharged from? [facility]   Does the patient have one of the following disease processes/diagnoses(primary or secondary)? CHF   Week 3 attempt successful? No   Unsuccessful attempts Attempt 1          MORENITA RIOS - Registered Nurse

## 2022-07-29 NOTE — OUTREACH NOTE
CHF Week 3 Survey    Flowsheet Row Responses   [facility] patient discharged from? [facility]   Does the patient have one of the following disease processes/diagnoses(primary or secondary)? CHF   Week 3 attempt successful? Yes   Call start time [phone]   Call end time [phone]   General alerts for this patient Son's  was on 22   Discharge diagnosis Upper GI bleed   Person spoke with today (if not patient) and taina Christianson   Meds reviewed with patient/caregiver? Yes   Is the patient having any side effects they believe may be caused by any medication additions or changes? No   Does the patient have all medications ordered at discharge? Yes   Is the patient taking all medications as directed (includes completed medication regime)? Yes   Comments regarding appointments Pain management appt is on 7/15/22,  22 pacemaker check    Does the patient have a primary care provider?  Yes   Does the patient have an appointment with their PCP within 7 days of discharge? N/A   Comments regarding PCP 2nd appt with PCP is next week   Has the patient kept scheduled appointments due by today? N/A   What is the Home health agency?  [facility]   Has home health visited the patient within 72 hours of discharge? Unsure   Home health comments D/C's from home health last week   What DME was ordered? Oxygen   Has all DME been delivered? Yes   DME comments Pt wears oxygen PRN and sometimes he sleeps with it   Pulse Ox monitoring Intermittent   O2 Sat comments Not checked recently    Psychosocial issues? No   Did the patient receive a copy of their discharge instructions? Yes   Nursing interventions Reviewed instructions with patient   What is the patient's perception of their health status since discharge? Same   Is the patient weighing daily? No   Does the patient have scales? Yes   Daily weight interventions Education provided on importance of daily weight   Is the patient able to teach back Heart Failure  Zones? Yes  [Wife reports pt is in red zone. Pt had an appt with PCP on 22 that had to be rescheduled to next r/t son's . Wife stated if he gets worse she will call an ambulance.]   Is the patient able to teach back signs and symptoms of worsening condition? (i.e. weight gain, shortness of air, etc.) Yes   CHF Week 3 call completed? Yes          LINO CLAUDIO - Registered Nurse

## 2022-08-26 NOTE — PROGRESS NOTES
Encounter Date:08/30/2022  Last seen 2/23/2022      Patient ID: Ro Nathan is a 76 y.o. male.    Chief Complaint:  Status post stent  Status post pacemaker  Chronic atrial fibrillation  Anticoagulation management        History of present illness  Patient did not have any hospital admissions or usage of nitroglycerin since last visit.    Since I have last seen, the patient has been without any chest discomfort ,shortness of breath, palpitations, dizziness or syncope.  Denies having any headache ,abdominal pain ,nausea, vomiting , diarrhea constipation, loss of weight or loss of appetite.  Denies having any excessive bruising ,hematuria or blood in the stool.    Review of all systems negative except as indicated.    Reviewed ROS.  Assessment and plan  ]]]]]]]]]]]]]]]]]]]]]  Impression  ========   -Status post stent placement   Status post stent to proximal LAD 12/5/2019.  Patient had stent to circumflex and RCA in the past.     -Significant mitral regurgitation (EMBER (10/18/2021     -Significant coronary artery disease involving ostial circumflex coronary artery and ramus intermedius as well as RCA.  Surgery was thought to be high risk and surgery was not offered by cardiovascular surgeons.  Intervention was not favorable due to ostial disease in the circumflex and ramus intermedius.     Cardiac catheterization (right and left heart and EMBER (10/18/2021) reveale.)  Left ventricular size and contractility is normal with ejection fraction of 60%. 2+ mitral regurgitation is present.  Mild to moderate pulmonary hypertension  Left main coronary artery is normal.  Left anterior descending artery is a lengthy stent in the proximal segment and is patent.  Ramus intermedius has proximal 99% disease.  Circumflex coronary artery is a large and dominant vessel that has ostial 80 to 90% disease.  It provided multiple marginal branches and PDA.  First marginal branch has mid segment and 99% disease.  PDA is a large vessel  that has proximal lengthy 90% disease.  Right coronary artery is a nondominant vessel that has 95% disease in the proximal segment within the stent.     EMBER 10/18/2021 reviewed  Significant mitral regurgitation central jet traversing to the lateral wall and superiorly and into the pulmonary vein.  Mild tricuspid regurgitation.  Calculated pulmonary artery pressure is 40 mmHg  Left atrial enlargement.  Left atrial appendage is normal in size without clot.  Left ventricle is normal in size and contractility with ejection fraction of 60%.     Cardiac catheterization 12/5/2019 by Dr. Munson  Left ventriculography  The overall estimated left ventricular ejection fraction was 65%.  Native coronary arteriography: Left dominant circulation     1.  Left main coronary arteries a large-caliber vessel gives rise left anterior descending left circumflex flex arteries.  There is an ostial eccentric 40% lesion that appears angiographically to approach 50%. No significant coronary atherosclerotic disease present.  2.  Left anterior descending artery is a large-caliber vessel that gives rise to large caliber diagonal branches and courses along the anterior interventricular groove and wraps around the apex.  There is a proximal eccentric 50% lesion within an in-stent restenotic segment followed by an greater than 80% focal eccentric in-stent restenotic lesion followed by a 60% lesion after the stented segment and otherwise no coronary atherosclerotic disease of any significance present.  3.  The left circumflex arteries a large-caliber vessel gives rise to large caliber bifurcating obtuse marginal branch.  There is an ostial proximal concentric 50% lesion that is calcified that immediately gives rise to a very high first obtuse marginal branch that is best classified as a ramus intermedius branch supplying large territory in the anterolateral wall that has an ostial proximal 70% lesion.  The calcified continuing circumflex and the  posterior AV groove gives a second obtuse marginal branch that has mild diffuse disease, a third obtuse marginal branch and then a bifurcating system that has a bifurcation stenting within it before giving rise to the left PDA.  There is diffuse 50% in-stent restenosis within this bifurcation stented segment into the obtuse marginal branch which itself has diffuse 50 to 60% disease within the stented segment.  There is a concentric 80% lesion in the circumflex PDA.  No significant coronary atherosclerotic disease present  4.  The right coronary arteries a large-caliber nondominant vessel has a stented mid segment after which there several acute marginal branches there is 50% in-stent restenosis approaching 60% at some portions within this nondominant right proximal to mid proximal segment.     Conclusions:     1.  Normal left heart filling pressures with preserved LV systolic function  2.  Normal epicardial anatomy with severe two-vessel coronary artery disease involving what is likely the culprit for the patient's chest pain syndrome, namely the very severe proximal LAD lesions.  We will treat the remaining circumflex lesions with medical therapy and see if the patient's symptoms resolved; this includes the severe lesion within the proximal portion of the high obtuse marginal branch within the circumflex system as well.     -Status post permanent ventricular pacemaker implantation ([employer] 1/13/2015)  Battery status is 4.5 years.     -History of fever and sepsis.  Positive blood cultures for coagulase-negative staph.      EMBER 7/22/2021 revealed  No evidence for valvular vegetations is present.  Significant mitral regurgitation with central jet extending to the lateral wall and into the superior aspect of the left atrium.  Mild tricuspid regurgitation.  Calculated pulmonary artery pressure is 25 mmHg.  Left atrial and left atrial appendage enlargement is present without clot.  Normal left ventricle size and  contractility with ejection fraction of 60%.     -Dyslipidemia diabetes hypertension COPD CKD 3.  History of cirrhosis     -Chronic atrial fibrillation     Anticoagulation-on Eliquis     -History of right foot ulceration with bone involvement.     -Peripheral vascular disease     -Large abdominal ventral hernia.     -Status post appendectomy appendectomy cholecystectomy left elbow surgery hernia repair     -Allergic to morphine pantoprazole haloperidol  ===========  Plan  ========  Significant coronary artery disease  Patient is not having any angina pectoris or congestive heart failure  Patient has significant coronary artery disease and mitral regurgitation.     Cardiac catheterization 10/19/2021 revealed severe coronary artery disease.  Patient was referred for CABG and mitral valve surgery     Have discussed with Dr. Antoine regarding CABG and mitral valve surgery.  Patient was thought to be high risk with significant comorbidities especially risk for infection.  Essentially patient was declined for surgical intervention.     Have discussed with interventionist Dr. Norwood regarding interventional options.  Significant coronary artery disease involving ostial circumflex coronary artery and ramus intermedius as well as RCA.  Intervention was not favorable due to ostial disease in the circumflex and ramus intermedius.     Meanwhile maximize medical treatment.  Continue metoprolol and Imdur.  Patient is on Ranexa and aspirin Imdur metoprolol  If blood pressure is a concern losartan dose can be reduced or discontinued.  Blood pressure today 110/70     Renal dysfunction  Renal consultation and follow-up appreciated.  18/1.10-10/19/2021  22/1.3-10/20/2021     Status post pacemaker.  Interrogation of the pacemaker revealed excellent pacing parameters.  Pacemaker site looks normal.  Battery status 2.5 years.     Chronic atrial fibrillation.-Rate controlled     Anticoagulation was reviewed.  Patient was on  Eliquis  Observe for toxic effects.     Hypertension- 120/70    Dyslipidemia-continue atorvastatin     Follow-up in office in 6 months with pacemaker interrogation.     Further plan will depend on patient's progress.     ]]]]]]]]]]]]]]]]]]]]]                    Diagnosis Plan   1. Presence of cardiac pacemaker     2. AV block, complete (HCC)     3. Essential hypertension     4. Status post angioplasty with stent     5. Dyslipidemia     6. Permanent atrial fibrillation (HCC)     7. Presence of coronary angioplasty implant and graft     LAB RESULTS (LAST 7 DAYS)    CBC        BMP        CMP         BNP        TROPONIN        CoAg        Creatinine Clearance  CrCl cannot be calculated (Patient's most recent lab result is older than the maximum 30 days allowed.).    ABG        Radiology  No radiology results for the last day                The following portions of the patient's history were reviewed and updated as appropriate: allergies, current medications, past family history, past medical history, past social history, past surgical history and problem list.    Review of Systems   Constitutional: Negative for fever and malaise/fatigue.   Cardiovascular: Positive for leg swelling. Negative for chest pain, dyspnea on exertion and palpitations.   Respiratory: Positive for shortness of breath. Negative for cough.    Skin: Negative for rash.   Gastrointestinal: Negative for abdominal pain, nausea and vomiting.   Neurological: Negative for focal weakness and headaches.   All other systems reviewed and are negative.        Current Outpatient Medications:   •  apixaban (ELIQUIS) 5 MG tablet tablet, Take 5 mg by mouth 2 (Two) Times a Day., Disp: , Rfl:   •  aspirin 81 MG EC tablet, Take 1 tablet by mouth 3 (Three) Times a Week., Disp: 30 tablet, Rfl: 5  •  atorvastatin (LIPITOR) 40 MG tablet, Take 40 mg by mouth Every Morning., Disp: , Rfl:   •  budesonide (PULMICORT) 0.5 MG/2ML nebulizer solution, Take 0.5 mg by nebulization  2 (Two) Times a Day As Needed., Disp: , Rfl:   •  docusate sodium (COLACE) 100 MG capsule, Take 200 mg by mouth 2 (Two) Times a Day., Disp: , Rfl:   •  famotidine (PEPCID) 20 MG tablet, Take 1 tablet by mouth 2 (Two) Times a Day Before Meals., Disp: 60 tablet, Rfl: 0  •  furosemide (LASIX) 40 MG tablet, Take 40 mg by mouth Every Morning., Disp: , Rfl:   •  glimepiride (AMARYL) 2 MG tablet, Take 2 mg by mouth Every Morning Before Breakfast., Disp: , Rfl:   •  hydrOXYzine (ATARAX) 25 MG tablet, , Disp: , Rfl:   •  ipratropium-albuterol (DUO-NEB) 0.5-2.5 mg/3 ml nebulizer, Take 3 mL by nebulization 3 (Three) Times a Day., Disp: 360 mL, Rfl: 3  •  isosorbide mononitrate (IMDUR) 120 MG 24 hr tablet, Take 1 tablet by mouth Daily., Disp: 30 tablet, Rfl: 3  •  lisinopril (Zestril) 2.5 MG tablet, Take 1 tablet by mouth Daily., Disp: 30 tablet, Rfl: 3  •  metOLazone (ZAROXOLYN) 5 MG tablet, Take 5 mg by mouth 3 (Three) Times a Week. Monday, Wednesday, Friday, Disp: , Rfl:   •  metoprolol tartrate (LOPRESSOR) 50 MG tablet, , Disp: , Rfl:   •  mirtazapine (REMERON) 30 MG tablet, Take 30 mg by mouth Every Night., Disp: , Rfl:   •  nitroglycerin (NITROSTAT) 0.4 MG SL tablet, Place 0.4 mg under the tongue Every 5 (Five) Minutes As Needed for Chest Pain., Disp: , Rfl:   •  oxyCODONE-acetaminophen (Percocet)  MG per tablet, Take 1 tablet by mouth Every 6 (Six) Hours As Needed for Moderate Pain ., Disp: 120 tablet, Rfl: 0  •  pregabalin (LYRICA) 150 MG capsule, Take 150 mg by mouth 2 (Two) Times a Day., Disp: , Rfl:   •  ranolazine (RANEXA) 1000 MG 12 hr tablet, , Disp: , Rfl:   •  rOPINIRole (REQUIP) 1 MG tablet, , Disp: , Rfl:   •  sucralfate (CARAFATE) 1 g tablet, Take 1 tablet by mouth 4 (Four) Times a Day Before Meals & at Bedtime., Disp: 120 tablet, Rfl: 0  •  traZODone (DESYREL) 100 MG tablet, Take 100 mg by mouth Every Night., Disp: , Rfl:   •  VENTOLIN  (90 Base) MCG/ACT inhaler, Inhale 2 puffs Every 6 (Six)  Hours As Needed for Wheezing or Shortness of Air., Disp: , Rfl:   •  vitamin D (ERGOCALCIFEROL) 1.25 MG (64422 UT) capsule capsule, Take 50,000 Units by mouth 1 (One) Time Per Week. Friday, Disp: , Rfl:     Allergies   Allergen Reactions   • Haloperidol Hives   • Morphine Itching   • Pantoprazole Other (See Comments)     Feels sick after receiving   • Latex Unknown - High Severity       Family History   Problem Relation Age of Onset   • Alzheimer's disease Mother    • GI problems Father    • Heart disease Father        Past Surgical History:   Procedure Laterality Date   • ABDOMINAL SURGERY     • APPENDECTOMY     • BONE CURETTAGE Right 1/22/2021    Procedure: Wound excision with fifth metatarsal head resection, right foot.;  Surgeon: Josef Egan DPM;  Location: [facility] MAIN OR;  Service: Podiatry;  Laterality: Right;   • BONE INCISION AND DRAINAGE Right 10/5/2020    Procedure: Incision and drainage with debridement of all nonviable soft tissue and bone with bone biopsy, right foot.;  Surgeon: Josef Egan DPM;  Location: [facility] MAIN OR;  Service: Podiatry;  Laterality: Right;   • BRONCHOSCOPY N/A 4/23/2022    Procedure: BRONCHOSCOPY with bronchioalveolar lavage of right lower lobe;  Surgeon: Edwin Kline MD;  Location: [facility] ENDOSCOPY;  Service: Pulmonary;  Laterality: N/A;  pneumonia   • CARDIAC CATHETERIZATION N/A 12/5/2019    Procedure: Left Heart Cath;  Surgeon: iMrza Munson MD;  Location: [facility] CATH INVASIVE LOCATION;  Service: Cardiology   • CARDIAC CATHETERIZATION N/A 12/5/2019    Procedure: Stent MICHAEL coronary;  Surgeon: Mirza Munson MD;  Location: [facility] CATH INVASIVE LOCATION;  Service: Cardiology   • CARDIAC CATHETERIZATION N/A 10/18/2021    Procedure: Left Heart Cath and coronary angiogram;  Surgeon: Celine Swanson MD;  Location: [facility] CATH INVASIVE LOCATION;  Service: Cardiovascular;  Laterality: N/A;   • CARDIAC CATHETERIZATION N/A 10/18/2021    Procedure:  Right Heart Cath;  Surgeon: Celine Swanson MD;  Location: [facility] CATH INVASIVE LOCATION;  Service: Cardiovascular;  Laterality: N/A;   • CHOLECYSTECTOMY     • ELBOW PROCEDURE      left   • ENDOSCOPY N/A 7/6/2022    Procedure: ESOPHAGOGASTRODUODENOSCOPY with biopsy x 1 area. bicap cautery, scelero therapy.;  Surgeon: Douglas Delgadillo MD;  Location: [facility] ENDOSCOPY;  Service: Gastroenterology;  Laterality: N/A;  ESOPHAGITIS, ANTREL ULCER, DUODENAL ULCER   • FOOT SURGERY      right foot   • HERNIA REPAIR     • INCISION AND DRAINAGE OF WOUND Right 4/6/2021    Procedure: INCISION AND DRAINAGE OF RIGHT FOOT 5TH METATARSAL TO BONE AND PARTIAL 5TH METATARSAL RESECTION;  Surgeon: Josef Egan DPM;  Location: [facility] MAIN OR;  Service: Podiatry;  Laterality: Right;   • INCISION AND DRAINAGE OF WOUND Right 4/8/2021    Procedure: INCISION AND DRAINAGE BONE, DELAYED PRIMARY CLOSURE;  Surgeon: Josef Egan DPM;  Location: [facility] MAIN OR;  Service: Podiatry;  Laterality: Right;   • INTERVENTIONAL RADIOLOGY PROCEDURE N/A 12/5/2019    Procedure: Intravascular Ultrasound;  Surgeon: Mirza Munson MD;  Location: [facility] CATH INVASIVE LOCATION;  Service: Cardiology   • PACEMAKER IMPLANTATION     • NE RT/LT HEART CATHETERS N/A 12/5/2019    Procedure: Percutaneous Coronary Intervention;  Surgeon: Mirza Munson MD;  Location: [facility] CATH INVASIVE LOCATION;  Service: Cardiology   • WOUND DEBRIDEMENT Right 10/29/2020    Procedure: Preparation and debridement of foot wound with application of Integra wound graft, right foot.;  Surgeon: Josef Egan DPM;  Location: [facility] MAIN OR;  Service: Podiatry;  Laterality: Right;   • WOUND DEBRIDEMENT N/A 7/29/2021    Procedure: EXCISIONAL ABDOMINAL WOUND  DEBRIDEMENT;  Surgeon: Cleopatra Wang MD;  Location: [facility] MAIN OR;  Service: General;  Laterality: N/A;   • WOUND DEBRIDEMENT N/A 8/1/2021    Procedure: DEBRIDEMENT OF ABDOMINAL WALL;  Surgeon: Sriram  "Hill CAMPOS DO;  Location: [facility] MAIN OR;  Service: General;  Laterality: N/A;       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) 10/26/2020   • Benign hypertension with CKD (chronic kidney disease) stage III (HCC) 10/26/2020   • Bruises easily    • CHF (congestive heart failure) (HCC)    • Chronic bronchitis with COPD (chronic obstructive pulmonary disease) (HCC) 10/26/2020   • Chronic respiratory failure with hypoxia (HCC) 10/26/2020   • Congenital heart defect    • Difficulty attaining erection    • Heart block    • Hernia of abdominal wall    • Hypertension    • Low back pain    • Pacemaker    • Peripheral vascular disease due to secondary diabetes (HCC) 10/26/2020   • Poor circulation    • Prostate cancer ([facility])     prostate   • Shortness of breath    • Sleep apnea     using CPAP    • Stage 3a chronic kidney disease ([facility]) 10/26/2020   • Stented coronary artery 2019    MICHAEL to LAD   • Type 2 diabetes, controlled, with neuropathy ([facility]) 10/26/2020    no meds   • Type 2 diabetes, controlled, with neuropathy ([facility]) 10/26/2020       Family History   Problem Relation Age of Onset   • Alzheimer's disease Mother    • GI problems Father    • Heart disease Father        Social History     Socioeconomic History   • Marital status:    Tobacco Use   • Smoking status: Former Smoker     Years: 15.00     Types: Cigarettes     Start date: 1966     Quit date: 5/10/2020     Years since quittin.3   • Smokeless tobacco: Never Used   Vaping Use   • Vaping Use: Never used   Substance and Sexual Activity   • Alcohol use: Yes     Alcohol/week: 4.0 standard drinks     Types: 4 Shots of liquor per week     Comment: occasionally   • Drug use: No   • Sexual activity: Defer         Procedures      Objective:       Physical Exam    /74 (BP Location: Left arm, Patient Position: Sitting)   Pulse 85   Ht 182.9 cm (72\")   Wt 124 kg (274 lb)   SpO2 95%   BMI 37.16 kg/m²   The " patient is alert, oriented and in no distress.    Vital signs as noted above.  Exogenous obesity (BMI 37)    Head and neck revealed no carotid bruits or jugular venous distension.  No thyromegaly or lymphadenopathy is present.    Lungs clear.  No wheezing.  Breath sounds are normal bilaterally.    Heart normal first and second heart sounds.  No murmur..  No pericardial rub is present.  No gallop is present.    Abdomen soft and nontender.  No organomegaly is present.    Extremities revealed good peripheral pulses without any pedal edema.    Skin warm and dry.  Pacemaker site looks normal.    Musculoskeletal system is grossly normal.    CNS grossly normal.    Reviewed and updated.

## 2022-09-18 PROBLEM — K56.609 SBO (SMALL BOWEL OBSTRUCTION) (HCC): Status: ACTIVE | Noted: 2022-01-01

## 2022-09-18 PROBLEM — Z99.81 DEPENDENCE ON SUPPLEMENTAL OXYGEN: Status: ACTIVE | Noted: 2022-01-01

## 2022-09-23 NOTE — OUTREACH NOTE
Prep Survey    Flowsheet Row Responses   [facility] facility patient discharged from? [facility]   Is LACE score < 7 ? No   Emergency Room discharge w/ pulse ox? No   Eligibility Readm Mgmt   Discharge diagnosis bowel obstruction   Does the patient have one of the following disease processes/diagnoses(primary or secondary)? Other   Does the patient have Home health ordered? No   Is there a DME ordered? No   Prep survey completed? Yes          JARET PAGE - Registered Nurse

## 2022-09-26 NOTE — OUTREACH NOTE
Medical Week 1 Survey    Flowsheet Row Responses   [facility] facility patient discharged from? [facility]   Does the patient have one of the following disease processes/diagnoses(primary or secondary)? Other   Week 1 attempt successful? No   Unsuccessful attempts Attempt 1          KITA PAGE - Licensed Nurse

## 2022-09-27 NOTE — ED PROVIDER NOTES
"Subjective   History of Present Illness  Chief Complaint: Elbow pain patient is a 77-year-old    Context: Patient is a 77-year-old obese  male who presents from home today with complaints of left elbow pain and swelling that he noticed this morning.  Patient states that he had a fever of 102 yesterday but otherwise felt okay.  He states that he fell on his elbow about 5 or 6 years ago, requiring surgical intervention and he believes that his hardware may be \"coming loose.\"  Patient presents with wife at bedside, who states that patient was discharged from the hospital about a week ago after being diagnosed with a bowel obstruction.  He did not have any surgical intervention at that time but she states that he has a history of for hernias.  She states that patient is a diabetic and takes glipizide but only checks blood sugars \"sometimes.\"  She states that he has not checked his blood sugar since discharge.  Patient also complains of a wound to his left anterior calf that he reports has been there since his discharge.  Wife states that he was told to keep wound covered but presents today with no bandage or covering in place.  Patient denies cough, shortness of breath, chest pain, weakness and dysuria.  He denies any falls or injury to the elbow itself.  He states that he has allergies to morphine, haloperidol and Protonix.  Currently he rates his pain 9/10 and describes it as a pressure.  It is worse when he tries to move his arm and feels like it radiates to his wrist.  Patient denies use of drugs and alcohol at this time.    Duration: 12 hours    Timing: waxes and wanes    Severity: 9/10    Associated: Redness, tenderness, swelling        Review of Systems   Constitutional: Positive for fever. Negative for chills.   HENT: Negative for congestion, rhinorrhea and sore throat.    Eyes: Negative.    Respiratory: Negative for cough and shortness of breath.    Cardiovascular: Negative for chest pain and " palpitations.   Gastrointestinal: Positive for nausea. Negative for abdominal pain, diarrhea and vomiting.   Endocrine: Negative.    Genitourinary: Negative for dysuria and urgency.   Musculoskeletal: Positive for arthralgias, joint swelling and myalgias.   Skin: Positive for color change and wound.   Neurological: Negative for dizziness, syncope, weakness, numbness and headaches.   Hematological: Negative.    Psychiatric/Behavioral: Negative for confusion. The patient is not nervous/anxious.    All other systems reviewed and are negative.      Past Medical History:   Diagnosis Date   • Alcoholic cirrhosis of liver with ascites ([facility]) 10/26/2020   • Benign hypertension with CKD (chronic kidney disease) stage III ([facility]) 10/26/2020   • Bruises easily    • CHF (congestive heart failure) ([facility])    • Chronic bronchitis with COPD (chronic obstructive pulmonary disease) ([facility]) 10/26/2020   • Chronic respiratory failure with hypoxia ([facility]) 10/26/2020   • Congenital heart defect    • Difficulty attaining erection    • Heart block    • Hernia of abdominal wall    • Hypertension    • Low back pain    • Pacemaker    • Peripheral vascular disease due to secondary diabetes ([facility]) 10/26/2020   • Poor circulation    • Prostate cancer ([facility])     prostate   • Shortness of breath    • Sleep apnea     using CPAP    • Stage 3a chronic kidney disease ([facility]) 10/26/2020   • Stented coronary artery 12/05/2019    MCIHAEL to LAD   • Type 2 diabetes, controlled, with neuropathy ([facility]) 10/26/2020    no meds   • Type 2 diabetes, controlled, with neuropathy ([facility]) 10/26/2020       Allergies   Allergen Reactions   • Haloperidol Hives   • Morphine Itching   • Pantoprazole Other (See Comments)     Feels sick after receiving   • Latex Unknown - High Severity       Past Surgical History:   Procedure Laterality Date   • ABDOMINAL SURGERY     • APPENDECTOMY     • BONE CURETTAGE Right 1/22/2021    Procedure: Wound excision with fifth metatarsal head resection, right  foot.;  Surgeon: Josef Egan DPM;  Location: [facility] MAIN OR;  Service: Podiatry;  Laterality: Right;   • BONE INCISION AND DRAINAGE Right 10/5/2020    Procedure: Incision and drainage with debridement of all nonviable soft tissue and bone with bone biopsy, right foot.;  Surgeon: Josef Egan DPM;  Location: [facility] MAIN OR;  Service: Podiatry;  Laterality: Right;   • BRONCHOSCOPY N/A 4/23/2022    Procedure: BRONCHOSCOPY with bronchioalveolar lavage of right lower lobe;  Surgeon: Edwin Kline MD;  Location: [facility] ENDOSCOPY;  Service: Pulmonary;  Laterality: N/A;  pneumonia   • CARDIAC CATHETERIZATION N/A 12/5/2019    Procedure: Left Heart Cath;  Surgeon: Mirza Munson MD;  Location: [facility] CATH INVASIVE LOCATION;  Service: Cardiology   • CARDIAC CATHETERIZATION N/A 12/5/2019    Procedure: Stent MICHAEL coronary;  Surgeon: Mirza Munson MD;  Location: [facility] CATH INVASIVE LOCATION;  Service: Cardiology   • CARDIAC CATHETERIZATION N/A 10/18/2021    Procedure: Left Heart Cath and coronary angiogram;  Surgeon: Celine Swanson MD;  Location: [facility] CATH INVASIVE LOCATION;  Service: Cardiovascular;  Laterality: N/A;   • CARDIAC CATHETERIZATION N/A 10/18/2021    Procedure: Right Heart Cath;  Surgeon: Celine Swanson MD;  Location: [facility] CATH INVASIVE LOCATION;  Service: Cardiovascular;  Laterality: N/A;   • CHOLECYSTECTOMY     • ELBOW PROCEDURE      left   • ENDOSCOPY N/A 7/6/2022    Procedure: ESOPHAGOGASTRODUODENOSCOPY with biopsy x 1 area. bicap cautery, scelero therapy.;  Surgeon: Douglas Delgadillo MD;  Location: [facility] ENDOSCOPY;  Service: Gastroenterology;  Laterality: N/A;  ESOPHAGITIS, ANTREL ULCER, DUODENAL ULCER   • FOOT SURGERY      right foot   • HERNIA REPAIR     • INCISION AND DRAINAGE OF WOUND Right 4/6/2021    Procedure: INCISION AND DRAINAGE OF RIGHT FOOT 5TH METATARSAL TO BONE AND PARTIAL 5TH METATARSAL RESECTION;  Surgeon: Josef Egan DPM;  Location: [facility]  MAIN OR;  Service: Podiatry;  Laterality: Right;   • INCISION AND DRAINAGE OF WOUND Right 2021    Procedure: INCISION AND DRAINAGE BONE, DELAYED PRIMARY CLOSURE;  Surgeon: Josef Egan DPM;  Location: [facility] MAIN OR;  Service: Podiatry;  Laterality: Right;   • INTERVENTIONAL RADIOLOGY PROCEDURE N/A 2019    Procedure: Intravascular Ultrasound;  Surgeon: Mirza Munson MD;  Location: [facility] CATH INVASIVE LOCATION;  Service: Cardiology   • PACEMAKER IMPLANTATION     • HI RT/LT HEART CATHETERS N/A 2019    Procedure: Percutaneous Coronary Intervention;  Surgeon: Mirza Munson MD;  Location: [facility] CATH INVASIVE LOCATION;  Service: Cardiology   • WOUND DEBRIDEMENT Right 10/29/2020    Procedure: Preparation and debridement of foot wound with application of Integra wound graft, right foot.;  Surgeon: Josef Egan DPM;  Location: [facility] MAIN OR;  Service: Podiatry;  Laterality: Right;   • WOUND DEBRIDEMENT N/A 2021    Procedure: EXCISIONAL ABDOMINAL WOUND  DEBRIDEMENT;  Surgeon: Cleopatra Wang MD;  Location: [facility] MAIN OR;  Service: General;  Laterality: N/A;   • WOUND DEBRIDEMENT N/A 2021    Procedure: DEBRIDEMENT OF ABDOMINAL WALL;  Surgeon: Hill Bhatia DO;  Location: [facility] MAIN OR;  Service: General;  Laterality: N/A;       Family History   Problem Relation Age of Onset   • Alzheimer's disease Mother    • GI problems Father    • Heart disease Father        Social History     Socioeconomic History   • Marital status:    Tobacco Use   • Smoking status: Former Smoker     Years: 15.00     Types: Cigarettes     Start date: 1966     Quit date: 5/10/2020     Years since quittin.3   • Smokeless tobacco: Never Used   Vaping Use   • Vaping Use: Never used   Substance and Sexual Activity   • Alcohol use: Yes     Alcohol/week: 4.0 standard drinks     Types: 4 Shots of liquor per week     Comment: occasionally   • Drug use: No   • Sexual activity: Defer  "          Objective   Physical Exam  Vitals and nursing note reviewed.   Constitutional:       General: He is not in acute distress.     Appearance: Normal appearance. He is not ill-appearing.   HENT:      Head: Normocephalic and atraumatic.   Eyes:      Extraocular Movements: Extraocular movements intact.      Pupils: Pupils are equal, round, and reactive to light.   Cardiovascular:      Rate and Rhythm: Normal rate and regular rhythm.      Pulses: Normal pulses.      Heart sounds: Normal heart sounds. No murmur heard.  Pulmonary:      Effort: Pulmonary effort is normal. No respiratory distress.      Breath sounds: Normal breath sounds.   Abdominal:      General: Bowel sounds are normal.      Palpations: Abdomen is soft.      Tenderness: There is no abdominal tenderness.   Musculoskeletal:         General: Swelling and tenderness present. No deformity.      Right upper arm: Normal.      Left upper arm: Normal.      Right elbow: Normal.      Left elbow: Swelling present. Decreased range of motion. Tenderness present in olecranon process.      Right forearm: Normal.      Left forearm: Normal.      Right wrist: Normal.      Left wrist: Normal.      Cervical back: Normal range of motion and neck supple.      Right knee: Normal.      Left knee: Normal.      Right lower leg: Swelling present. 1+ Edema present.      Left lower leg: Swelling present. 1+ Edema present.      Right ankle: Swelling present.      Left ankle: Swelling present.        Legs:       Comments: Poor-healing wound of 6sim4mp with mild clear discharge from two open areas   Skin:     General: Skin is warm and dry.   Neurological:      Mental Status: He is alert.   Psychiatric:         Mood and Affect: Mood normal.         Behavior: Behavior normal.         Procedures           ED Course      /50   Pulse 70   Temp 97.7 °F (36.5 °C) (Oral)   Resp 18   Ht 182.9 cm (72\")   Wt 126 kg (278 lb)   SpO2 98%   BMI 37.70 kg/m²   Labs Reviewed "   COMPREHENSIVE METABOLIC PANEL - Abnormal; Notable for the following components:       Result Value    Glucose 146 (*)     Creatinine 1.44 (*)     Potassium 3.0 (*)     Chloride 95 (*)     Calcium 7.7 (*)     Albumin 3.10 (*)     eGFR 50.4 (*)     All other components within normal limits    Narrative:     GFR Normal >60  Chronic Kidney Disease <60  Kidney Failure <15     URINALYSIS W/ MICROSCOPIC IF INDICATED (NO CULTURE) - Abnormal; Notable for the following components:    Appearance, UA Hazy (*)     Protein, UA 30 mg/dL (1+) (*)     All other components within normal limits   CBC WITH AUTO DIFFERENTIAL - Abnormal; Notable for the following components:    RBC 3.69 (*)     Hemoglobin 8.2 (*)     Hematocrit 26.8 (*)     MCV 72.7 (*)     MCH 22.1 (*)     MCHC 30.4 (*)     RDW 24.1 (*)     RDW-SD 61.3 (*)     Lymphocyte % 9.5 (*)     Monocyte % 15.0 (*)     Monocytes, Absolute 1.30 (*)     All other components within normal limits   C-REACTIVE PROTEIN - Abnormal; Notable for the following components:    C-Reactive Protein 19.51 (*)     All other components within normal limits   SEDIMENTATION RATE - Abnormal; Notable for the following components:    Sed Rate 27 (*)     All other components within normal limits   URINALYSIS, MICROSCOPIC ONLY - Abnormal; Notable for the following components:    Bacteria, UA 1+ (*)     Mucus, UA Small/1+ (*)     All other components within normal limits   MAGNESIUM - Abnormal; Notable for the following components:    Magnesium 1.5 (*)     All other components within normal limits   POCT GLUCOSE FINGERSTICK - Abnormal; Notable for the following components:    Glucose 119 (*)     All other components within normal limits   POC LACTATE - Normal   WOUND CULTURE   BLOOD CULTURE   BLOOD CULTURE   POCT GLUCOSE FINGERSTICK   POC LACTATE   CBC AND DIFFERENTIAL    Narrative:     The following orders were created for panel order CBC & Differential.  Procedure                                Abnormality         Status                     ---------                               -----------         ------                     CBC Auto Differential[[id number]]        Abnormal            Final result               Scan Slide[[id number]]                                                                    Please view results for these tests on the individual orders.     Medications   sodium chloride 0.9 % flush 10 mL (has no administration in time range)   cefTRIAXone (ROCEPHIN) 1 g in sodium chloride 0.9 % 100 mL IVPB (has no administration in time range)   Pharmacy to dose vancomycin (has no administration in time range)   vancomycin 2000 mg/500 mL 0.9% NS IVPB ([facility]) (has no administration in time range)   ondansetron (ZOFRAN) injection 4 mg (4 mg Intravenous Given 9/27/22 1935)   ketorolac (TORADOL) injection 15 mg (15 mg Intravenous Given 9/27/22 1935)   HYDROmorphone (DILAUDID) injection 0.5 mg (0.5 mg Intravenous Given 9/27/22 2211)   magnesium sulfate 2g/50 mL (PREMIX) infusion (2 g Intravenous New Bag 9/27/22 2216)   potassium chloride (K-DUR,KLOR-CON) CR tablet 40 mEq (40 mEq Oral Given 9/27/22 2217)     XR ELBOW 2 VIEW LEFT    Result Date: 9/27/2022  1. Negative for fracture. 2. Soft tissue swelling overlying olecranon most suggestive of olecranon bursitis. 3. Chronic changes related to prior ORIF with distal ulnar screw fixation and chronic soft tissue calcifications overlying the posterior aspect of the elbow.  Electronically Signed By-Valente Hoover MD On:9/27/2022 8:20 PM This report was finalized on [id number] by  Valente Hoover MD.                                         MDM  Number of Diagnoses or Management Options  Elbow pain, left  Olecranon bursitis of left elbow  Wound infection  Diagnosis management comments: Patient was placed in a gown prior to assessment.  He is alert, nontoxic-appearing and stable on exam.  He is not requiring supplemental oxygen at this time and is afebrile.  An IV was  established and labs were obtained.  Wound culture order was placed.  I discussed patient with Dr. Ballard, who does not recommend aspiration immediately due to patient's history of surgery and diabetes.  He was medicated with Zofran and Toradol.    Chart Review: Wound was noted at last hospitalization as abrasion to patient's left anterior calf.  Wound has worsened as it now has purulent drainage coming from it.    Comorbidities: Diabetic  Differentials: Olecranon bursitis, septic arthritis, elbow dislocation, abscess.  Not all inclusive of differentials considered.   Discussion with Provider: Dr. Ballard    Lab Interpretation: As above  Radiology Interpretation: As above    Appropriate PPE worn during exam.    Labs reveal magnesium of 1.5 and potassium of 3.0.  Magnesium and potassium replacement protocols were ordered.  White count today is normal.  However sed rate and CRP are both elevated.  Hemoglobin is 8.2 but this is stable for patient historically.  Upon reassessment, patient states that the Toradol initially helped but that he was still experiencing pain.  Dilaudid was ordered and he states it improved his pain.  I discussed the findings with the patient and informed him of need for admission for IV antibiotics due to poorly healing wound.  I also informed him that Ortho would be consulted regarding olecranon bursitis to his left elbow.  Patient is consentable to admission but states he did rather go home because he was just discharged.  He verbalizes understanding for importance of admission.  I ordered Ortho and wound care consults for the morning, as well as Rocephin and Vanc to begin tonight.  I discussed the patient with SACHA Bui with [employer] [facility].  She accepted the patient on behalf of of Dr. Sanders.  Patient has remained stable and alert throughout his ER stay.  He is afebrile and in no acute distress.    This document is intended for medical expert use only.  Reading of this document by  patients and/or patient's family without participating medical staff guidance may result in misinterpretation and unintended morbidity.  Any interpretation of such data is the responsibility of the patient and/or family member responsible for the patient in concert with their primary or specialist providers, not to be left for sources of online search as such as [employer], [employer] or similar queries.  Relying on these approaches to knowledge may result in misinterpretation, misguided goals of care and even death should patient or family members try recommendations outside of the realm of professional medical care in a supervised inpatient environment.       Amount and/or Complexity of Data Reviewed  Clinical lab tests: reviewed and ordered  Tests in the radiology section of CPT®: reviewed and ordered  Decide to obtain previous medical records or to obtain history from someone other than the patient: yes    Risk of Complications, Morbidity, and/or Mortality  Presenting problems: high  Diagnostic procedures: high  Management options: high    Patient Progress  Patient progress: improved      Final diagnoses:   Elbow pain, left   Olecranon bursitis of left elbow   Wound infection       ED Disposition  ED Disposition     ED Disposition   Decision to Admit    Condition   --    Comment   Level of Care: Med/Surg [1]   Admitting Physician: ERNESTO WINTER [[id number]]   Attending Physician: ERNESTO WINTER [[id number]]               No follow-up provider specified.       Medication List      No changes were made to your prescriptions during this visit.          Jeaneth Patel, APRN  09/27/22 [id number]

## 2022-09-27 NOTE — ED NOTES
Patient presents for complaint of left elbow pain, redness, and swelling with onset this morning. States he had elbow surgery approximately 5-6 years ago.   Also complains of fluid seeping from his left leg. Has wounds. Reports that he is a diabetic

## 2022-09-28 PROBLEM — L08.9 WOUND INFECTION: Status: ACTIVE | Noted: 2022-01-01

## 2022-09-28 PROBLEM — M25.522 ELBOW PAIN, LEFT: Status: ACTIVE | Noted: 2022-01-01

## 2022-09-28 PROBLEM — M70.22 OLECRANON BURSITIS OF LEFT ELBOW: Status: ACTIVE | Noted: 2022-01-01

## 2022-09-28 PROBLEM — T14.8XXA WOUND INFECTION: Status: ACTIVE | Noted: 2022-01-01

## 2022-09-28 NOTE — CASE MANAGEMENT/SOCIAL WORK
Case Management Readmission Assessment Note    Case Management Readmission Assessment (all recorded)     Readmission Interview     Row Name 09/28/22 1433             Readmission Indications    Is this hospitalization related to the prior hospital diagnosis? No      What was the reason you were admitted? SBO      Row Name 09/28/22 1433             Recommendation for rehospitalization    Did you speak with your physician prior to coming to the hospital No      Who recommended you return to the hospital? --  Self and spouse      Did you seek care elsewhere prior to coming to the hospital? No      Row Name 09/28/22 1433             Follow up appointment    Do you have a PCP? Yes      Did you have an appointment with PCP/specialist after hospitalization within 7 days? No      Row Name 09/28/22 1433             Medications    Did you have newly prescribed medications at discharge? No      Are you taking all of you prescribed medications? Yes      Row Name 09/28/22 1433             Discharge Instructions    Did you understand your discharge instructions? --  States he didn't receive any, no med list      Were you told to eat a special diet? No      Row Name 09/28/22 1433             Index discharge location/services    Do you have supportive family or friends in the home? Yes      Row Name 09/28/22 1433             Discharge Readiness    On a scale of 1-5 (5 being well prepared), how ready were you for discharge 5  Pt was ready to go home, felt he was at a 5.      Recommendation based on interview Education on diagnosis/self management              Met with patient in room wearing PPE: mask.      Maintained distance greater than six feet and spent less than 15 minutes in the room.       Left message requesting callback.

## 2022-09-28 NOTE — H&P
The patient has been evaluated by myself.  The patient does have a very complex medical history but presented with an acute left painful swollen left elbow with profound cellulitis, olecranon bursitis and possible septic arthropathy.  And microbial therapy has been initiated and we will ask orthopedic surgery to participate for definitive intervention.  Pain control will be provided.

## 2022-09-28 NOTE — PLAN OF CARE
Goal Outcome Evaluation:  Plan of Care Reviewed With: patient     Problem: Adult Inpatient Plan of Care  Goal: Plan of Care Review  Outcome: Ongoing, Progressing  Flowsheets (Taken 9/28/2022 0612)  Progress: no change  Plan of Care Reviewed With: patient        Progress: no change

## 2022-09-28 NOTE — NURSING NOTE
WOCN note:    77 yr old male admitted 9/27/22 with painful, swollen left elbow. WOCN consult received for left lower leg wounds.     Patient presents with two partial thickness wounds to his left anterior lower leg. There are several surrounding dried abrasions noted as well. The wound measure approximately 1.5x1.5cm each. There is yellow serous exudate noted on the old dressing. The foot is warm with brisk cap refill and pedal pulses are palpable.     The wounds were cleansed and the leg was moisturized with dimethicone cream. An oil emulsion dressing was applied over the wounds and covered with Maxorb and ABD pad. The dressings were secured with kerlix and ACE wraps from the base of the toes to below the knee.    Recommend patient follow up in the outpatient wound center for ongoing wound care and compression. We will continue to follow as needed.

## 2022-09-28 NOTE — PROGRESS NOTES
LOS: 1 day   Patient Care Team:  Yusuf Jones MD as PCP - General  PhiJoshua MD as Consulting Physician (Cardiology)  Moise Watson MD as Consulting Physician (Cardiac Electrophysiology)  Izzy Alvarez MD as Consulting Physician (Nephrology)  Celine Swanson MD as Consulting Physician (Cardiology)    Subjective:  Pain in the left elbow  Objective:   Afebrile      Review of Systems:   Review of Systems   Constitutional: Positive for activity change.   Respiratory: Positive for shortness of breath.    Cardiovascular: Negative.    Gastrointestinal: Negative.    Neurological: Positive for weakness.           Vital Signs  Temp:  [97.7 °F (36.5 °C)-97.8 °F (36.6 °C)] 97.7 °F (36.5 °C)  Heart Rate:  [69-98] 76  Resp:  [18] 18  BP: (102-133)/(48-77) 124/68    Physical Exam:  Physical Exam  Vitals and nursing note reviewed.   Cardiovascular:      Rate and Rhythm: Normal rate.      Heart sounds: Normal heart sounds.   Pulmonary:      Breath sounds: Normal breath sounds.   Abdominal:      General: There is distension.      Palpations: Abdomen is soft.      Hernia: A hernia is present.   Neurological:      Mental Status: He is alert.          Radiology:  XR ELBOW 2 VIEW LEFT    Result Date: 9/27/2022  1. Negative for fracture. 2. Soft tissue swelling overlying olecranon most suggestive of olecranon bursitis. 3. Chronic changes related to prior ORIF with distal ulnar screw fixation and chronic soft tissue calcifications overlying the posterior aspect of the elbow.  Electronically Signed By-Valente Hoover MD On:9/27/2022 8:20 PM This report was finalized on [id number] by  Valente Hoover MD.         Results Review:     I reviewed the patient's new clinical results.  I reviewed the patient's new imaging results and agree with the interpretation.    Medication Review:   Scheduled Meds:apixaban, 5 mg, Oral, Q12H  aspirin, 81 mg, Oral, Once per day on Mon Wed Fri  atorvastatin, 40 mg,  Oral, Daily  budesonide, 0.5 mg, Nebulization, BID - RT  docusate sodium, 200 mg, Oral, BID  famotidine, 20 mg, Oral, BID AC  furosemide, 40 mg, Oral, Daily  isosorbide mononitrate, 120 mg, Oral, Q24H  metOLazone, 5 mg, Oral, Once per day on Mon Wed Fri  metoprolol tartrate, 50 mg, Oral, BID With Meals  mirtazapine, 30 mg, Oral, Nightly  pregabalin, 150 mg, Oral, BID  ranolazine, 1,000 mg, Oral, Q12H  rOPINIRole, 1 mg, Oral, Nightly  sodium chloride, 3 mL, Intravenous, Q12H  traZODone, 100 mg, Oral, Nightly      Continuous Infusions:Pharmacy to dose vancomycin,       PRN Meds:.HYDROmorphone  •  hydrOXYzine  •  ipratropium-albuterol  •  magnesium sulfate **OR** magnesium sulfate **OR** magnesium sulfate  •  nitroglycerin  •  ondansetron **OR** ondansetron  •  oxyCODONE  •  Pharmacy to dose vancomycin  •  potassium chloride **OR** potassium chloride **OR** potassium chloride  •  [COMPLETED] Insert peripheral IV **AND** sodium chloride  •  sodium chloride    Labs:    CBC    Results from last 7 days   Lab Units 09/28/22 0313 09/27/22 1932 09/21/22  2345   WBC 10*3/mm3 8.50 8.70 6.00   HEMOGLOBIN g/dL 7.9* 8.2* 8.3*   PLATELETS 10*3/mm3 286 290 310     BMP   Results from last 7 days   Lab Units 09/28/22 0313 09/27/22 1932 09/21/22  2345   SODIUM mmol/L 136 136 139   POTASSIUM mmol/L 3.2* 3.0* 3.3*   CHLORIDE mmol/L 96* 95* 99   CO2 mmol/L 27.0 29.0 30.0*   BUN mg/dL 24* 22 11   CREATININE mg/dL 1.42* 1.44* 0.93   GLUCOSE mg/dL 170* 146* 144*   MAGNESIUM mg/dL 1.5* 1.5*  --      Cr Clearance Estimated Creatinine Clearance: 60.7 mL/min (A) (by C-G formula based on SCr of 1.42 mg/dL (H)).  Coag     HbA1C   Lab Results   Component Value Date    HGBA1C 8.5 (H) 06/02/2022    HGBA1C 8.5 (H) 04/19/2022    HGBA1C 6.7 (H) 09/28/2021     Blood Glucose   Glucose   Date/Time Value Ref Range Status   09/27/2022 1945 119 (H) 70 - 105 mg/dL Final     Comment:     Serial Number: [id number]:  [id number]     Infection     CMP    Results from last 7 days   Lab Units 09/28/22  0313 09/27/22  1932 09/21/22  2345   SODIUM mmol/L 136 136 139   POTASSIUM mmol/L 3.2* 3.0* 3.3*   CHLORIDE mmol/L 96* 95* 99   CO2 mmol/L 27.0 29.0 30.0*   BUN mg/dL 24* 22 11   CREATININE mg/dL 1.42* 1.44* 0.93   GLUCOSE mg/dL 170* 146* 144*   ALBUMIN g/dL  --  3.10*  --    BILIRUBIN mg/dL  --  1.0  --    ALK PHOS U/L  --  109  --    AST (SGOT) U/L  --  20  --    ALT (SGPT) U/L  --  7  --      UA    Results from last 7 days   Lab Units 09/27/22  2106   NITRITE UA  Negative   WBC UA /HPF None Seen   BACTERIA UA /HPF 1+*   SQUAM EPITHEL UA /HPF 0-2     Radiology(recent) XR ELBOW 2 VIEW LEFT    Result Date: 9/27/2022  1. Negative for fracture. 2. Soft tissue swelling overlying olecranon most suggestive of olecranon bursitis. 3. Chronic changes related to prior ORIF with distal ulnar screw fixation and chronic soft tissue calcifications overlying the posterior aspect of the elbow.  Electronically Signed By-Valente Hoover MD On:9/27/2022 8:20 PM This report was finalized on [id number] by  Valente Hoover MD.     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  Peptic ulcer disease  History of erosive gastritis  History of grade B esophagitis  Gastroesophageal reflux disease with esophagitis  Herbert's esophageal change  DM II with renal manifestations  DMII with angiopathic manifestations  DMII with neuropathic manifestations  Diabetic dyslipidemia  Panlobular COPD with emphysema  Nicotine dependency with nicotine use disorder, cigarettes  DDD L/S  CKD II  HTN associated with CKD II  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  Cirrhosis  Small vessel PAD secondary to diabetes  Diabetic lower extremity ulcerations  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, MOSD  History of right renal mass  3.4 cm infrarenal abdominal aortic  aneurysm  Valvular heart disease with significant mitral regurgitation  Paroxysmal atrial fibrillation  Hypercoagulable state secondary to atrial fibrillation  Chronic oral anticoagulation therapy with direct thrombin inhibition  ROS  ACD  Chronic systolic congestive heart failure  Chronic hypoxic respiratory failure  Supplemental oxygen dependency  Chronic mucopurulent bronchitis  History of prostate cancer  Morbid exogenous obesity  Hypoventilation apnea syndrome with ARTI  Peripheral polyneuropathy secondary to alcohol and diabetes  Autonomic neuropathy secondary to diabetes with autonomic dysfunction syndrome  Monoclonal paraproteinemia  Narcotic dependency  Vitamin D deficiency  ETOHISM  H/O pacemaker placement  Hypokalemia  Hypomagnesemia    Plan:  Orthopedic evaluation//antimicrobial therapy          Yusuf Jones MD  09/28/22  08:46 EDT

## 2022-09-28 NOTE — PROGRESS NOTES
"Pharmacy Antimicrobial Dosing Service    Subjective:  Ro Nathan is a 77 y.o.male admitted with possible bone/joint and SSTI. Pharmacy has been consulted to dose Vancomycin.    PMH: Left elbow swelling and pain with prior history of surgery on this elbow from a fall approximately 5-6 years ago.       Assessment/Plan    1. Day #1 Vancomycin: Goal -600 mcg*h/mL. Vancomycin 2000 mg X 1 dose (21 mg/kg adjusted weight) this AM 9/28 at 0315 . SCR has varied; therefore, I will continue to pulse dose. Vancomycin random 9/28 at 1052= 15.5 mcg/ml. Will schedule vancomycin 1500 mg X 1 dose (15 mg per kg adjusted weight) this afternoon with follow up random level ordered for 9/29 with AM labs.     2. Day #2 Ceftriaxone: 2000 mg IV q24h. (First dose was 1 gram)    Will continue to monitor drug levels, renal function, culture and sensitivities, and patient clinical status.       Objective:  Relevant clinical data and objective history reviewed:  182.9 cm (72.01\")   126 kg (278 lb)   Ideal body weight: 77.6 kg (171 lb 1.9 oz)  Adjusted ideal body weight: 97 kg (213 lb 13.9 oz)  Body mass index is 37.7 kg/m².    Results from last 7 days   Lab Units 09/28/22  1052   VANCOMYCIN RM mcg/mL 15.50     Results from last 7 days   Lab Units 09/28/22 0313 09/27/22 1932 09/21/22  2345   CREATININE mg/dL 1.42* 1.44* 0.93     Estimated Creatinine Clearance: 60.7 mL/min (A) (by C-G formula based on SCr of 1.42 mg/dL (H)).  No intake/output data recorded.    Results from last 7 days   Lab Units 09/28/22 0313 09/27/22 1932 09/21/22  2345   WBC 10*3/mm3 8.50 8.70 6.00     Temperature    09/27/22 1838 09/27/22 2359 09/28/22 0426   Temp: 97.7 °F (36.5 °C) 97.8 °F (36.6 °C) 97.7 °F (36.5 °C)     Baseline culture/source/susceptibility:  Microbiology Results (last 10 days)       Procedure Component Value - Date/Time    Wound Culture - Drainage, Leg, Left [[id number]] Collected: 09/27/22 1935    Lab Status: Preliminary result Specimen: " Drainage from Leg, Left Updated: 09/28/22 0942     Wound Culture Growth present, too young to evaluate     Gram Stain Rare (1+) WBCs seen      Rare (1+) Gram positive cocci            Anti-Infectives (From admission, onward)      Ordered     Dose/Rate Route Frequency Start Stop    09/28/22 1208  vancomycin 1500 mg/500 mL 0.9% NS IVPB ([facility])        Ordering Provider: Jeaneth Patel APRN    1,500 mg Intravenous Once 09/28/22 1500      09/28/22 1212  Vancomycin Pharmacy Intermittent/Pulse Dosing        Ordering Provider: Julissa Gunn APRN     Does not apply As Needed 09/28/22 1211 10/05/22 1210    09/27/22 2222  vancomycin 2000 mg/500 mL 0.9% NS IVPB ([facility])        Ordering Provider: Jeaneth Patel APRN    2,000 mg Intravenous Once 09/27/22 2300 09/28/22 0315    09/27/22 2214  cefTRIAXone (ROCEPHIN) 1 g in sodium chloride 0.9 % 100 mL IVPB        Ordering Provider: Jeaneth Patel APRN    1 g  200 mL/hr over 30 Minutes Intravenous Once 09/27/22 2216 09/27/22 2258    09/27/22 2214  Pharmacy to dose vancomycin        Ordering Provider: Julissa Gunn APRN     Does not apply Continuous PRN 09/27/22 2212 10/04/22 2211            Temi aMrtinez, PharmD  09/28/22 12:13 EDT

## 2022-09-28 NOTE — ED NOTES
Noticed patient's oxygen saturation level in the 80s. No shortness of breath reported. No increased work of breathing noted. Patient repositioned in bed with little improvement in saturation. Patient reports history of COPD and uses oxygen occasionally at home

## 2022-09-28 NOTE — CASE MANAGEMENT/SOCIAL WORK
Discharge Planning Assessment   [facility]     Patient Name: Ro Nathan  MRN: [id number]  Today's Date: 9/28/2022    Admit Date: 9/27/2022    Plan: DC Plan: From home with spouse, IV antibiotics. Ortho consult.   Discharge Needs Assessment     Row Name 09/28/22 1436       Living Environment    People in Home spouse    Name(s) of People in Home Meghan    Current Living Arrangements home    Primary Care Provided by self;spouse/significant other    Provides Primary Care For no one    Family Caregiver if Needed spouse    Family Caregiver Names Meghan    Quality of Family Relationships unable to assess    Able to Return to Prior Arrangements yes       Resource/Environmental Concerns    Resource/Environmental Concerns none    Transportation Concerns none       Transition Planning    Patient/Family Anticipates Transition to home with family    Patient/Family Anticipated Services at Transition none    Transportation Anticipated family or friend will provide       Discharge Needs Assessment    Readmission Within the Last 30 Days current reason for admission unrelated to previous admission    Equipment Currently Used at Home nebulizer;cane, quad;wheelchair;shower chair;oxygen  Home O2 used prn, unknown company    Concerns to be Addressed discharge planning    Anticipated Changes Related to Illness none    Discharge Coordination/Progress DC Plan: From home with spouse, IV antibiotics. Ortho consult.               Discharge Plan     Row Name 09/28/22 1440       Plan    Plan DC Plan: From home with spouse, IV antibiotics. Ortho consult.    Plan Comments IV Vanc. L elbow bursitis, wound L calf. Confirms insurance and PCP, Pharmacy [facility] Meds to Beds              Continued Care and Services - Admitted Since 9/27/2022    Coordination has not been started for this encounter.     Selected Continued Care - Prior Encounters Includes selections from prior encounters from 6/29/2022 to 9/28/2022    Discharged on  7/8/2022 Admission date: 7/5/2022 - Discharge disposition: Home or Self Care    Home Medical Care     Service Provider Selected Services Address Phone Fax Patient Preferred    [facility]  [facility] [address] [place] [address] [phone] --                       Demographic Summary     Row Name 09/28/22 1431       General Information    Admission Type inpatient    Arrived From emergency department    Required Notices Provided Important Message from Medicare    Referral Source admission list    Reason for Consult discharge planning    Preferred Language English    General Information Comments Spoke to pt in room.               Functional Status     Row Name 09/28/22 1432       Functional Status    Usual Activity Tolerance good    Current Activity Tolerance moderate       Functional Status, IADL    Medications independent    Meal Preparation assistive person    Housekeeping assistive person    Laundry assistive person    Shopping assistive person    IADL Comments Spouse assists with ADLs.       Mental Status    General Appearance WDL WDL       Mental Status Summary    Recent Changes in Mental Status/Cognitive Functioning no changes              Met with patient in room wearing PPE: mask.      Maintained distance greater than six feet and spent less than 15 minutes in the room.               Margy Serrano RN

## 2022-09-28 NOTE — H&P
Patient Care Team:  Yusuf Jones MD as PCP - General  [facility] MD as Consulting Physician (Cardiology)  Shirley, Moise Kathleen MD as Consulting Physician (Cardiac Electrophysiology)  Izzy Alvarez MD as Consulting Physician (Nephrology)  Celine Swanson MD as Consulting Physician (Cardiology)    Chief complaint Elbow pain    Subjective     Patient is a 77 y.o. male who presents with complaints of left elbow pain and swelling that he noticed this morning. Pain waxes and wanes, feels like throbbing.  Patient reports a fever yesterday of 102, but has otherwise felt ok. Patient states he fell on his elbow approximately 5-6 years ago, requiring surgical intervention and he believes that his hardware may be coming loose. Patient states he was recently discharged a few days ago from a bowel obstruction, with no surgical intervention. Patient also complains of a wound to his left anterior calf that he reports was there at the time of his recent discharge from the hospital. Patient denies nausea, vomiting, diarrhea, shortness of breath, or chest pain. Denies abdominal pain, weakness, any urinary symptoms.     Onset of symptoms was 1 day.      Review of Systems   Constitutional: Positive for fever. Negative for activity change and fatigue.   HENT: Negative.    Eyes: Negative.    Respiratory: Negative.    Cardiovascular: Negative.    Gastrointestinal: Positive for abdominal distention and nausea. Negative for abdominal pain, constipation and diarrhea.   Endocrine: Negative.    Genitourinary: Negative.    Musculoskeletal: Positive for joint swelling.   Skin: Positive for wound.   Neurological: Negative.    Psychiatric/Behavioral: Negative.           History  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) 10/26/2020   • Benign hypertension with CKD (chronic kidney disease) stage III (HCC) 10/26/2020   • Bruises easily    • CHF (congestive heart  failure) (Formerly [facility])    • Chronic bronchitis with COPD (chronic obstructive pulmonary disease) (Formerly [facility]) 10/26/2020   • Chronic respiratory failure with hypoxia (Formerly [facility]) 10/26/2020   • Congenital heart defect    • Difficulty attaining erection    • Heart block    • Hernia of abdominal wall    • Hypertension    • Low back pain    • Pacemaker    • Peripheral vascular disease due to secondary diabetes (Formerly [facility]) 10/26/2020   • Poor circulation    • Prostate cancer (Formerly [facility])     prostate   • Shortness of breath    • Sleep apnea     using CPAP    • Stage 3a chronic kidney disease (Formerly [facility]) 10/26/2020   • Stented coronary artery 12/05/2019    MICHAEL to LAD   • Type 2 diabetes, controlled, with neuropathy (Formerly [facility]) 10/26/2020    no meds   • Type 2 diabetes, controlled, with neuropathy (Formerly [facility]) 10/26/2020     Past Surgical History:   Procedure Laterality Date   • ABDOMINAL SURGERY     • APPENDECTOMY     • BONE CURETTAGE Right 1/22/2021    Procedure: Wound excision with fifth metatarsal head resection, right foot.;  Surgeon: Josef Egan DPM;  Location: [facility] MAIN OR;  Service: Podiatry;  Laterality: Right;   • BONE INCISION AND DRAINAGE Right 10/5/2020    Procedure: Incision and drainage with debridement of all nonviable soft tissue and bone with bone biopsy, right foot.;  Surgeon: Josef Egan DPM;  Location: [facility] MAIN OR;  Service: Podiatry;  Laterality: Right;   • BRONCHOSCOPY N/A 4/23/2022    Procedure: BRONCHOSCOPY with bronchioalveolar lavage of right lower lobe;  Surgeon: Edwin Kilne MD;  Location: [facility] ENDOSCOPY;  Service: Pulmonary;  Laterality: N/A;  pneumonia   • CARDIAC CATHETERIZATION N/A 12/5/2019    Procedure: Left Heart Cath;  Surgeon: Mirza Munson MD;  Location: [facility] CATH INVASIVE LOCATION;  Service: Cardiology   • CARDIAC CATHETERIZATION N/A 12/5/2019    Procedure: Stent MICHAEL coronary;  Surgeon: Mirza Munson MD;  Location: [facility] CATH INVASIVE LOCATION;  Service: Cardiology   • CARDIAC  CATHETERIZATION N/A 10/18/2021    Procedure: Left Heart Cath and coronary angiogram;  Surgeon: Celine Swanson MD;  Location: [facility] CATH INVASIVE LOCATION;  Service: Cardiovascular;  Laterality: N/A;   • CARDIAC CATHETERIZATION N/A 10/18/2021    Procedure: Right Heart Cath;  Surgeon: Celine Swanson MD;  Location: [facility] CATH INVASIVE LOCATION;  Service: Cardiovascular;  Laterality: N/A;   • CHOLECYSTECTOMY     • ELBOW PROCEDURE      left   • ENDOSCOPY N/A 7/6/2022    Procedure: ESOPHAGOGASTRODUODENOSCOPY with biopsy x 1 area. bicap cautery, scelero therapy.;  Surgeon: Douglas Delgadillo MD;  Location: [facility] ENDOSCOPY;  Service: Gastroenterology;  Laterality: N/A;  ESOPHAGITIS, ANTREL ULCER, DUODENAL ULCER   • FOOT SURGERY      right foot   • HERNIA REPAIR     • INCISION AND DRAINAGE OF WOUND Right 4/6/2021    Procedure: INCISION AND DRAINAGE OF RIGHT FOOT 5TH METATARSAL TO BONE AND PARTIAL 5TH METATARSAL RESECTION;  Surgeon: Josef Egan DPM;  Location: [facility] MAIN OR;  Service: Podiatry;  Laterality: Right;   • INCISION AND DRAINAGE OF WOUND Right 4/8/2021    Procedure: INCISION AND DRAINAGE BONE, DELAYED PRIMARY CLOSURE;  Surgeon: Josef Egan DPM;  Location: [facility] MAIN OR;  Service: Podiatry;  Laterality: Right;   • INTERVENTIONAL RADIOLOGY PROCEDURE N/A 12/5/2019    Procedure: Intravascular Ultrasound;  Surgeon: Mirza Munson MD;  Location: [facility] CATH INVASIVE LOCATION;  Service: Cardiology   • PACEMAKER IMPLANTATION     • OR RT/LT HEART CATHETERS N/A 12/5/2019    Procedure: Percutaneous Coronary Intervention;  Surgeon: Mirza Munson MD;  Location: [facility] CATH INVASIVE LOCATION;  Service: Cardiology   • WOUND DEBRIDEMENT Right 10/29/2020    Procedure: Preparation and debridement of foot wound with application of Integra wound graft, right foot.;  Surgeon: Josef Egan DPM;  Location: [facility] MAIN OR;  Service: Podiatry;  Laterality: Right;   • WOUND DEBRIDEMENT  N/A 2021    Procedure: EXCISIONAL ABDOMINAL WOUND  DEBRIDEMENT;  Surgeon: Cleopatra Wang MD;  Location: [facility] MAIN OR;  Service: General;  Laterality: N/A;   • WOUND DEBRIDEMENT N/A 2021    Procedure: DEBRIDEMENT OF ABDOMINAL WALL;  Surgeon: Hill Bhatia DO;  Location: [facility] MAIN OR;  Service: General;  Laterality: N/A;     Family History   Problem Relation Age of Onset   • Alzheimer's disease Mother    • GI problems Father    • Heart disease Father      Social History     Tobacco Use   • Smoking status: Former Smoker     Years: 15.00     Types: Cigarettes     Start date: 1966     Quit date: 5/10/2020     Years since quittin.3   • Smokeless tobacco: Never Used   Vaping Use   • Vaping Use: Never used   Substance Use Topics   • Alcohol use: Yes     Alcohol/week: 4.0 standard drinks     Types: 4 Shots of liquor per week     Comment: occasionally   • Drug use: No     Medications Prior to Admission   Medication Sig Dispense Refill Last Dose   • apixaban (ELIQUIS) 5 MG tablet tablet Take 5 mg by mouth 2 (Two) Times a Day.   2022 at Unknown time   • aspirin 81 MG EC tablet Take 1 tablet by mouth 3 (Three) Times a Week. 30 tablet 5 2022 at Unknown time   • atorvastatin (LIPITOR) 40 MG tablet Take 40 mg by mouth Every Morning.   2022 at Unknown time   • famotidine (PEPCID) 20 MG tablet Take 1 tablet by mouth 2 (Two) Times a Day Before Meals. 60 tablet 0 2022 at Unknown time   • furosemide (LASIX) 40 MG tablet Take 40 mg by mouth Every Morning.   2022 at Unknown time   • isosorbide mononitrate (IMDUR) 120 MG 24 hr tablet Take 1 tablet by mouth Daily. 30 tablet 3 2022 at Unknown time   • metoprolol tartrate (LOPRESSOR) 50 MG tablet Take 50 mg by mouth 2 (Two) Times a Day With Meals.   2022 at Unknown time   • mirtazapine (REMERON) 30 MG tablet Take 30 mg by mouth Every Night.   2022 at Unknown time   • oxyCODONE-acetaminophen (Percocet)  MG per tablet Take 1  tablet by mouth Every 6 (Six) Hours As Needed for Moderate Pain . 120 tablet 0 9/27/2022 at Unknown time   • pregabalin (LYRICA) 150 MG capsule Take 150 mg by mouth 2 (Two) Times a Day.   9/27/2022 at Unknown time   • ranolazine (RANEXA) 1000 MG 12 hr tablet Take 1,000 mg by mouth Every 12 (Twelve) Hours.   9/27/2022 at Unknown time   • rOPINIRole (REQUIP) 1 MG tablet Take 1 mg by mouth Daily.   9/27/2022 at Unknown time   • traZODone (DESYREL) 100 MG tablet Take 100 mg by mouth Every Night. After meals   9/27/2022 at Unknown time   • vitamin D (ERGOCALCIFEROL) 1.25 MG (71675 UT) capsule capsule Take 50,000 Units by mouth 1 (One) Time Per Week. Friday 9/27/2022 at Unknown time   • budesonide (PULMICORT) 0.5 MG/2ML nebulizer solution Take 2 mL by nebulization 2 (Two) Times a Day. 120 mL 6    • docusate sodium (COLACE) 100 MG capsule Take 200 mg by mouth 2 (Two) Times a Day.      • hydrOXYzine (ATARAX) 25 MG tablet Take 25 mg by mouth At Night As Needed.   Unknown at Unknown time   • ipratropium-albuterol (DUO-NEB) 0.5-2.5 mg/3 ml nebulizer Take 3 mL by nebulization 3 (Three) Times a Day. (Patient not taking: No sig reported) 360 mL 3 Unknown at Unknown time   • ipratropium-albuterol (DUO-NEB) 0.5-2.5 mg/3 ml nebulizer Take 3 mL by nebulization 4 (Four) Times a Day As Needed.   Unknown at Unknown time   • metOLazone (ZAROXOLYN) 5 MG tablet Take 5 mg by mouth 3 (Three) Times a Week. Monday, Wednesday, Friday    As needed for weight gain.      • nitroglycerin (NITROSTAT) 0.4 MG SL tablet Place 0.4 mg under the tongue Every 5 (Five) Minutes As Needed for Chest Pain.      • VENTOLIN  (90 Base) MCG/ACT inhaler Inhale 2 puffs Every 4 (Four) Hours As Needed for Wheezing or Shortness of Air. May use every 4 to 6 hours   Unknown at Unknown time     Allergies:  Haloperidol, Morphine, Pantoprazole, and Latex    Objective     Vital Signs  Temp:  [97.7 °F (36.5 °C)-97.8 °F (36.6 °C)] 97.8 °F (36.6 °C)  Heart Rate:   [69-98] 70  Resp:  [18] 18  BP: (102-133)/(48-77) 123/70     Physical Exam:      General Appearance:    Alert, cooperative, in no acute distress   Head:    Normocephalic, without obvious abnormality, atraumatic   Eyes:            Lids and lashes normal, conjunctivae and sclerae normal, no   icterus, no pallor, corneas clear, PERRLA   Ears:    Ears appear intact with no abnormalities noted   Throat:   No oral lesions, no thrush, oral mucosa moist   Neck:   No adenopathy, supple, trachea midline, no thyromegaly, no   carotid bruit, no JVD   Lungs:     Clear to auscultation,respirations regular, even and                  unlabored    Heart:    Regular rhythm and normal rate, normal S1 and S2, no            murmur, no gallop, no rub, no click   Chest Wall:    No abnormalities observed   Abdomen:     Normal bowel sounds, no masses, no organomegaly, soft        non-tender, non-distended, no guarding, no rebound                tenderness   Extremities:   Left elbow- decreased ROM, swelling and tenderness, +1 edema in bilateral lower extremities   Pulses:   Pulses palpable and equal bilaterally   Skin:   No bleeding, bruising or rash   Lymph nodes:   No palpable adenopathy   Neurologic:   Cranial nerves 2 - 12 grossly intact, sensation intact, DTR       present and equal bilaterally       Results Review:     Imaging Results (Last 24 Hours)     Procedure Component Value Units Date/Time    XR ELBOW 2 VIEW LEFT [[id number]] Collected: 09/27/22 2018     Updated: 09/27/22 2022    Narrative:      DATE OF EXAM:  9/27/2022 7:56 PM     PROCEDURE:  XR ELBOW 2 VW LEFT-     INDICATIONS:  elbow pain; M25.522-Pain in left elbow; M70.22-Olecranon bursitis, left  elbow; T14.8XXA-Other injury of unspecified body region, initial  encounter; L08.9-Local infection of the skin and subcutaneous tissue,  unspecified     COMPARISON:  05/20/2016     TECHNIQUE:   Two Radiologic views of the left elbow were obtained.     FINDINGS:  There is soft  tissue swelling overlying the olecranon. There is a single  fixation screw with cerclage wires again noted at the proximal ulna  extending to the olecranon unchanged in position from prior study. There  are soft tissue calcifications overlying the posterior soft tissues at  level of the distal triceps similar to prior exam. There is no acute  fracture. No definite joint effusion.        Impression:      1. Negative for fracture.  2. Soft tissue swelling overlying olecranon most suggestive of olecranon  bursitis.  3. Chronic changes related to prior ORIF with distal ulnar screw  fixation and chronic soft tissue calcifications overlying the posterior  aspect of the elbow.     Electronically Signed By-Valente Hoover MD On:9/27/2022 8:20 PM  This report was finalized on [id number] by  Valente Hoover MD.           Lab Results (last 24 hours)     Procedure Component Value Units Date/Time    Magnesium [[id number]]  (Abnormal) Collected: 09/27/22 1932    Specimen: Blood from Arm, Right Updated: 09/27/22 2143     Magnesium 1.5 mg/dL     Urinalysis, Microscopic Only - Urine, Clean Catch [[id number]]  (Abnormal) Collected: 09/27/22 2106    Specimen: Urine, Clean Catch Updated: 09/27/22 2132     RBC, UA None Seen /HPF      WBC, UA None Seen /HPF      Bacteria, UA 1+ /HPF      Squamous Epithelial Cells, UA 0-2 /HPF      Hyaline Casts, UA 0-2 /LPF      Mucus, UA Small/1+ /HPF      Methodology Manual Light Microscopy    Urinalysis With Microscopic If Indicated (No Culture) - Urine, Clean Catch [[id number]]  (Abnormal) Collected: 09/27/22 2106    Specimen: Urine, Clean Catch Updated: 09/27/22 2120     Color, UA Yellow     Appearance, UA Hazy     pH, UA <=5.0     Specific Gravity, UA 1.014     Glucose, UA Negative     Ketones, UA Negative     Bilirubin, UA Negative     Blood, UA Negative     Protein, UA 30 mg/dL (1+)     Leuk Esterase, UA Negative     Nitrite, UA Negative     Urobilinogen, UA 1.0 E.U./dL    Blood Culture - Blood,  Wrist, Right [[id number]] Collected: 09/27/22 2045    Specimen: Blood from Wrist, Right Updated: 09/27/22 2050    Blood Culture - Blood, Arm, Right [[id number]] Collected: 09/27/22 2045    Specimen: Blood from Arm, Right Updated: 09/27/22 2050    Wound Culture - Drainage, Leg, Left [[id number]] Collected: 09/27/22 1935    Specimen: Drainage from Leg, Left Updated: 09/27/22 2037     Gram Stain Rare (1+) WBCs seen      Rare (1+) Gram positive cocci    Comprehensive Metabolic Panel [[id number]]  (Abnormal) Collected: 09/27/22 1932    Specimen: Blood from Arm, Right Updated: 09/27/22 2025     Glucose 146 mg/dL      BUN 22 mg/dL      Creatinine 1.44 mg/dL      Sodium 136 mmol/L      Potassium 3.0 mmol/L      Chloride 95 mmol/L      CO2 29.0 mmol/L      Calcium 7.7 mg/dL      Total Protein 6.6 g/dL      Albumin 3.10 g/dL      ALT (SGPT) 7 U/L      AST (SGOT) 20 U/L      Alkaline Phosphatase 109 U/L      Total Bilirubin 1.0 mg/dL      Globulin 3.5 gm/dL      A/G Ratio 0.9 g/dL      BUN/Creatinine Ratio 15.3     Anion Gap 12.0 mmol/L      eGFR 50.4 mL/min/1.73      Comment: National Kidney Foundation and American Society of Nephrology (ASN) Task Force recommended calculation based on the Chronic Kidney Disease Epidemiology Collaboration (CKD-EPI) equation refit without adjustment for race.       Narrative:      GFR Normal >60  Chronic Kidney Disease <60  Kidney Failure <15      C-reactive Protein [[id number]]  (Abnormal) Collected: 09/27/22 1932    Specimen: Blood from Arm, Right Updated: 09/27/22 2006     C-Reactive Protein 19.51 mg/dL     POC Lactate [[id number]]  (Normal) Collected: 09/27/22 2002    Specimen: Blood Updated: 09/27/22 2003     Lactate 1.3 mmol/L      Comment: Serial Number: [id number]:  [id number]       Sedimentation Rate [[id number]]  (Abnormal) Collected: 09/27/22 1932    Specimen: Blood from Arm, Right Updated: 09/27/22 1950     Sed Rate 27 mm/hr     POC Glucose Once [[id number]]  (Abnormal) Collected:  09/27/22 1945    Specimen: Blood Updated: 09/1946     Glucose 119 mg/dL      Comment: Serial Number: [id number]:  [id number]       CBC & Differential [[id number]]  (Abnormal) Collected: 09/27/22 1932    Specimen: Blood from Arm, Right Updated: 09/27/22 1945    Narrative:      The following orders were created for panel order CBC & Differential.  Procedure                               Abnormality         Status                     ---------                               -----------         ------                     CBC Auto Differential[[id number]]        Abnormal            Final result               Scan Slide[[id number]]                                                                    Please view results for these tests on the individual orders.    CBC Auto Differential [[id number]]  (Abnormal) Collected: 09/27/22 1932    Specimen: Blood from Arm, Right Updated: 09/27/22 1945     WBC 8.70 10*3/mm3      RBC 3.69 10*6/mm3      Hemoglobin 8.2 g/dL      Hematocrit 26.8 %      MCV 72.7 fL      MCH 22.1 pg      MCHC 30.4 g/dL      RDW 24.1 %      RDW-SD 61.3 fl      MPV 7.9 fL      Platelets 290 10*3/mm3      Neutrophil % 73.7 %      Lymphocyte % 9.5 %      Monocyte % 15.0 %      Eosinophil % 1.0 %      Basophil % 0.8 %      Neutrophils, Absolute 6.40 10*3/mm3      Lymphocytes, Absolute 0.80 10*3/mm3      Monocytes, Absolute 1.30 10*3/mm3      Eosinophils, Absolute 0.10 10*3/mm3      Basophils, Absolute 0.10 10*3/mm3      nRBC 0.1 /100 WBC            I reviewed the patient's new clinical results.    Assessment & Plan     Left elbow pain- Xray shows soft tissue swelling overlying olecranon suggestive of olecranon bursitis, chronic changes related to prior ORIF  Olecranon bursitis-given Rocephin and Vanc in ED, continue vanc  Wound infection- wound care nurse consulted for dressing management  Hypokalemia- replacement protocol  Hypomagnesemia- replacement protocol      Continue home meds, monitor for pain  management, see wound care for wound on left calf, blood cultures pending.    DVT prophylaxis- SCD's  GI prophylaxis- pepcid    I discussed the patient's findings and my recommendations with patient.     Uma Neal, APRN  09/28/22  01:49 EDT  The patient was seen and evaluated by myself and I agree with the assessment as above.  There are multiple comorbidities and multiple factors to consider as we treat this patient.  Admission prognosis poor remains somewhat guarded.  Cultures will be obtained.

## 2022-09-28 NOTE — PLAN OF CARE
Goal Outcome Evaluation:  Plan of Care Reviewed With: patient           Outcome Evaluation: Pt is a 77 y.o. male with a complex medical hx who had recent hospitalization for bowel obstruction and returns with L elbow pain, L calf wound and fever. Pt has hx of ORIF to elbow in the past and is afraid that the hardware is loose. Dx with olecranon bursitis and possible septic arthropathy. Ortho consult ordered.  Pt also found to be hypokalemic and hypomagnesemic.  PMH: includes COPD, HF, CAD, chronic LBP, HTN, alcholic cirrhosis w/ ascities, prostate CA, pacemaker and DM. Pt lives with his wife in a H with a basement (he doesn't use) and no steps to enter. Pt reports that he is primarily 'homebound' using no AD to walk in the house and uses a RW to go out to doctor appointments.  Pt reports a fall where he tripped on the bedding at home but no other falls.  Upon evaluation, pt demonstrates ROM WFL BLEs and BUEs except painful ROM L elbow with extension limited 20 degrees from full due to pain. Pt with warmth, redness and edema L elbow. Pt performed bed mobility with min A for supine to sit and independence for sit to supine. He transferred with CGA and ambulated 60' with supervision, pushing IV pole for support. Distance limited by endurance.  PT educated pt on importance of ROM ex and pt performed shoulder shrugs, scap retraction and AROM to elbow, wrist and fingers within pain limits.  Pt would benefit from HH PT at discharge.

## 2022-09-28 NOTE — OUTREACH NOTE
Medical Week 1 Survey    Flowsheet Row Responses   [facility] patient discharged from? [facility]   Does the patient have one of the following disease processes/diagnoses(primary or secondary)? Other   Week 1 attempt successful? No   Unsuccessful attempts Attempt 2   Revoke Readmitted          JOSESITO PATEL - Registered Nurse

## 2022-09-29 NOTE — PLAN OF CARE
Goal Outcome Evaluation:  Plan of Care Reviewed With: patient        Progress: no change  Outcome Evaluation: Slept at intervals. PRN IV med given for frequent c/o pain. Left elbow red and edematous. Cold pack and Ace wrap applied per Ortho suggestion. O2 at 2L in use. Will continue to monitor.

## 2022-09-29 NOTE — CASE MANAGEMENT/SOCIAL WORK
Continued Stay Note  KATLYN Devlin     Patient Name: Ro Nathan  MRN: [id number]  Today's Date: 9/29/2022    Admit Date: 9/27/2022    Plan: Home with spouse and [facility] HH (accepted, will need order)   Discharge Plan     Row Name 09/29/22 1709       Plan    Plan Home with spouse and [facility] HH (accepted, will need order)    Plan Comments Barriers to discharge: IV vanc.  L elbow wound. Oretho consult              Expected Discharge Date and Time     Expected Discharge Date Expected Discharge Time    Sep 30, 2022       Met with patient in room wearing PPE: mask.    Maintained distance greater than six feet and spent less than 15 minutes in the room.    Yulia Barnett RN      Office Phone [phone]  Office Cell [phone]

## 2022-09-29 NOTE — CONSULTS
Orthopedic Consult      Patient: Ro Nathan    Date of Admission: 9/27/2022  6:44 PM    YOB: 1945    Medical Record Number: [id number]    Attending Physician: Yusuf Jones MD  Consulting Physician: Jozef Mosley MD Orthopedic Surgery      Chief Complaints: Wound infection [T14.8XXA, L08.9]  Elbow pain, left [M25.522]  Olecranon bursitis of left elbow [M70.22]      History of Present Illness: 77 y.o. male admitted to [facility] to services of Yusuf Jones MD with Wound infection [T14.8XXA, L08.9]  Elbow pain, left [M25.522]  Olecranon bursitis of left elbow [M70.22].  was consulted for further evaluation and treatment. Onset of symptoms was gradual over the past 1 week or so.  Symptoms are associated with swelling over the dorsal aspect of the left elbow centered over the tip of the olecranon process.  Symptoms are aggravated by flexion and extension of the elbow.   Symptoms improve with resting the elbow by the side.  He denies any history of trauma to the elbow.  He does not have a history of gout.  He has been admitted to the hospital for multiple medical issues including urinary tract infection.    Allergies   Allergen Reactions    Haloperidol Hives    Morphine Itching    Pantoprazole Other (See Comments)     Feels sick after receiving    Latex Unknown - High Severity       Medications:   Home Medications:  Medications Prior to Admission   Medication Sig Dispense Refill Last Dose    apixaban (ELIQUIS) 5 MG tablet tablet Take 5 mg by mouth 2 (Two) Times a Day.   9/27/2022 at Unknown time    aspirin 81 MG EC tablet Take 1 tablet by mouth 3 (Three) Times a Week. 30 tablet 5 9/27/2022 at Unknown time    atorvastatin (LIPITOR) 40 MG tablet Take 40 mg by mouth Every Morning.   9/27/2022 at Unknown time    famotidine (PEPCID) 20 MG tablet Take 1 tablet by mouth 2 (Two) Times a Day Before Meals. 60 tablet 0 9/27/2022 at Unknown time    furosemide (LASIX) 40 MG tablet Take 40 mg by  mouth Every Morning.   9/27/2022 at Unknown time    isosorbide mononitrate (IMDUR) 120 MG 24 hr tablet Take 1 tablet by mouth Daily. 30 tablet 3 9/27/2022 at Unknown time    metoprolol tartrate (LOPRESSOR) 50 MG tablet Take 50 mg by mouth 2 (Two) Times a Day With Meals.   9/27/2022 at Unknown time    mirtazapine (REMERON) 30 MG tablet Take 30 mg by mouth Every Night.   9/27/2022 at Unknown time    oxyCODONE-acetaminophen (Percocet)  MG per tablet Take 1 tablet by mouth Every 6 (Six) Hours As Needed for Moderate Pain . 120 tablet 0 9/27/2022 at Unknown time    pregabalin (LYRICA) 150 MG capsule Take 150 mg by mouth 2 (Two) Times a Day.   9/27/2022 at Unknown time    ranolazine (RANEXA) 1000 MG 12 hr tablet Take 1,000 mg by mouth Every 12 (Twelve) Hours.   9/27/2022 at Unknown time    rOPINIRole (REQUIP) 1 MG tablet Take 1 mg by mouth Daily.   9/27/2022 at Unknown time    traZODone (DESYREL) 100 MG tablet Take 100 mg by mouth Every Night. After meals   9/27/2022 at Unknown time    vitamin D (ERGOCALCIFEROL) 1.25 MG (25173 UT) capsule capsule Take 50,000 Units by mouth 1 (One) Time Per Week. Friday 9/27/2022 at Unknown time    budesonide (PULMICORT) 0.5 MG/2ML nebulizer solution Take 2 mL by nebulization 2 (Two) Times a Day. 120 mL 6     docusate sodium (COLACE) 100 MG capsule Take 200 mg by mouth 2 (Two) Times a Day.       hydrOXYzine (ATARAX) 25 MG tablet Take 25 mg by mouth At Night As Needed.   Unknown at Unknown time    ipratropium-albuterol (DUO-NEB) 0.5-2.5 mg/3 ml nebulizer Take 3 mL by nebulization 4 (Four) Times a Day As Needed.   Unknown at Unknown time    metOLazone (ZAROXOLYN) 5 MG tablet Take 5 mg by mouth 3 (Three) Times a Week. Monday, Wednesday, Friday    As needed for weight gain.       nitroglycerin (NITROSTAT) 0.4 MG SL tablet Place 0.4 mg under the tongue Every 5 (Five) Minutes As Needed for Chest Pain.       VENTOLIN  (90 Base) MCG/ACT inhaler Inhale 2 puffs Every 4 (Four) Hours As  Needed for Wheezing or Shortness of Air. May use every 4 to 6 hours   Unknown at Unknown time       Current Medications:  Scheduled Meds:apixaban, 5 mg, Oral, Q12H  aspirin, 81 mg, Oral, Once per day on Mon Wed Fri  atorvastatin, 40 mg, Oral, Daily  budesonide, 0.5 mg, Nebulization, BID - RT  cefTRIAXone, 2 g, Intravenous, Q24H  docusate sodium, 200 mg, Oral, BID  famotidine, 20 mg, Oral, BID AC  furosemide, 40 mg, Oral, Daily  isosorbide mononitrate, 120 mg, Oral, Q24H  metOLazone, 5 mg, Oral, Once per day on Mon Wed Fri  metoprolol tartrate, 50 mg, Oral, BID With Meals  mirtazapine, 30 mg, Oral, Nightly  pregabalin, 150 mg, Oral, BID  ranolazine, 1,000 mg, Oral, Q12H  rOPINIRole, 1 mg, Oral, Nightly  sodium chloride, 3 mL, Intravenous, Q12H  traZODone, 100 mg, Oral, Nightly      Continuous Infusions:Pharmacy to dose vancomycin,       PRN Meds:.benzonatate    HYDROmorphone    hydrOXYzine    ipratropium-albuterol    magnesium sulfate **OR** magnesium sulfate **OR** magnesium sulfate    nitroglycerin    ondansetron **OR** ondansetron    oxyCODONE    Pharmacy to dose vancomycin    potassium chloride **OR** potassium chloride **OR** potassium chloride    [COMPLETED] Insert peripheral IV **AND** sodium chloride    sodium chloride    Vancomycin Pharmacy Intermittent/Pulse Dosing       Past Medical History:   Diagnosis Date    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) 10/26/2020    Benign hypertension with CKD (chronic kidney disease) stage III (HCC) 10/26/2020    Bruises easily     CHF (congestive heart failure) (HCC)     Chronic bronchitis with COPD (chronic obstructive pulmonary disease) (HCC) 10/26/2020    Chronic respiratory failure with hypoxia (HCC) 10/26/2020    Congenital heart defect     Difficulty attaining erection     Heart block     Hernia of abdominal wall     Hypertension     Low back pain     Pacemaker     Peripheral vascular disease due to secondary diabetes (HCC) 10/26/2020    Poor circulation      Prostate cancer ([facility])     prostate    Shortness of breath     Sleep apnea     using CPAP     Stage 3a chronic kidney disease ([facility]) 10/26/2020    Stented coronary artery 12/05/2019    MICHAEL to LAD    Type 2 diabetes, controlled, with neuropathy ([facility]) 10/26/2020    no meds    Type 2 diabetes, controlled, with neuropathy ([facility]) 10/26/2020        Past Surgical History:   Procedure Laterality Date    ABDOMINAL SURGERY      APPENDECTOMY      BONE CURETTAGE Right 1/22/2021    Procedure: Wound excision with fifth metatarsal head resection, right foot.;  Surgeon: Josef Egan DPM;  Location: [facility] MAIN OR;  Service: Podiatry;  Laterality: Right;    BONE INCISION AND DRAINAGE Right 10/5/2020    Procedure: Incision and drainage with debridement of all nonviable soft tissue and bone with bone biopsy, right foot.;  Surgeon: Josef Egan DPM;  Location: [facility] MAIN OR;  Service: Podiatry;  Laterality: Right;    BRONCHOSCOPY N/A 4/23/2022    Procedure: BRONCHOSCOPY with bronchioalveolar lavage of right lower lobe;  Surgeon: Edwin Kline MD;  Location: [facility] ENDOSCOPY;  Service: Pulmonary;  Laterality: N/A;  pneumonia    CARDIAC CATHETERIZATION N/A 12/5/2019    Procedure: Left Heart Cath;  Surgeon: Mirza Munson MD;  Location: [facility] CATH INVASIVE LOCATION;  Service: Cardiology    CARDIAC CATHETERIZATION N/A 12/5/2019    Procedure: Stent MICHAEL coronary;  Surgeon: Mirza Munson MD;  Location: [facility] CATH INVASIVE LOCATION;  Service: Cardiology    CARDIAC CATHETERIZATION N/A 10/18/2021    Procedure: Left Heart Cath and coronary angiogram;  Surgeon: Celine Swanson MD;  Location: [facility] CATH INVASIVE LOCATION;  Service: Cardiovascular;  Laterality: N/A;    CARDIAC CATHETERIZATION N/A 10/18/2021    Procedure: Right Heart Cath;  Surgeon: Celine Swanson MD;  Location: [facility] CATH INVASIVE LOCATION;  Service: Cardiovascular;  Laterality: N/A;    CHOLECYSTECTOMY      ELBOW PROCEDURE      left     ENDOSCOPY N/A 7/6/2022    Procedure: ESOPHAGOGASTRODUODENOSCOPY with biopsy x 1 area. bicap cautery, scelero therapy.;  Surgeon: Douglas Delgadillo MD;  Location: [facility] ENDOSCOPY;  Service: Gastroenterology;  Laterality: N/A;  ESOPHAGITIS, ANTREL ULCER, DUODENAL ULCER    FOOT SURGERY      right foot    HERNIA REPAIR      INCISION AND DRAINAGE OF WOUND Right 4/6/2021    Procedure: INCISION AND DRAINAGE OF RIGHT FOOT 5TH METATARSAL TO BONE AND PARTIAL 5TH METATARSAL RESECTION;  Surgeon: Josef Egan DPM;  Location: [facility] MAIN OR;  Service: Podiatry;  Laterality: Right;    INCISION AND DRAINAGE OF WOUND Right 4/8/2021    Procedure: INCISION AND DRAINAGE BONE, DELAYED PRIMARY CLOSURE;  Surgeon: Josef Egan DPM;  Location: [facility] MAIN OR;  Service: Podiatry;  Laterality: Right;    INTERVENTIONAL RADIOLOGY PROCEDURE N/A 12/5/2019    Procedure: Intravascular Ultrasound;  Surgeon: Mirza Munson MD;  Location: [facility] CATH INVASIVE LOCATION;  Service: Cardiology    PACEMAKER IMPLANTATION      AK RT/LT HEART CATHETERS N/A 12/5/2019    Procedure: Percutaneous Coronary Intervention;  Surgeon: Mirza Munson MD;  Location: [facility] CATH INVASIVE LOCATION;  Service: Cardiology    WOUND DEBRIDEMENT Right 10/29/2020    Procedure: Preparation and debridement of foot wound with application of Integra wound graft, right foot.;  Surgeon: Josef Egan DPM;  Location: [facility] MAIN OR;  Service: Podiatry;  Laterality: Right;    WOUND DEBRIDEMENT N/A 7/29/2021    Procedure: EXCISIONAL ABDOMINAL WOUND  DEBRIDEMENT;  Surgeon: Cleopatra Wang MD;  Location: [facility] MAIN OR;  Service: General;  Laterality: N/A;    WOUND DEBRIDEMENT N/A 8/1/2021    Procedure: DEBRIDEMENT OF ABDOMINAL WALL;  Surgeon: Hill Bhatia DO;  Location: [facility] MAIN OR;  Service: General;  Laterality: N/A;        Social History     Occupational History    Not on file   Tobacco Use    Smoking status: Former Smoker     Years: 15.00      Types: Cigarettes     Start date: 1966     Quit date: 5/10/2020     Years since quittin.3    Smokeless tobacco: Never Used   Vaping Use    Vaping Use: Never used   Substance and Sexual Activity    Alcohol use: Yes     Alcohol/week: 4.0 standard drinks     Types: 4 Shots of liquor per week     Comment: occasionally    Drug use: No    Sexual activity: Defer      Social History     Social History Narrative    Not on file        Family History   Problem Relation Age of Onset    Alzheimer's disease Mother     GI problems Father     Heart disease Father          Review of Systems:   HEENT: Patient denies any headaches, vision changes, change in hearing, or tinnitus.  Patient denies any rhinorrhea,epistaxis, sinus pain, mouth or dental problems, sore throat or hoarseness, or dysphagia  Pulmonary: Patient denies any cough, congestion, SOA, or wheezing  Cardiovascular: Patient denies any chest pain, dyspnea, palpitations, weakness, intolerance of exercise, varicosities, swelling of extremities, known murmur  Gastrointestinal:  Patient denies nausea, vomiting, diarrhea, constipation, loss  of appetite, change in appetite, dysphagia, gas, heartburn, melena, change in bowel habits, use of laxatives or other drugs to alter the function of the gastrointestinal tract.  Genital/Urinary: Patient denies dysuria, change in color of urine, change in frequency of urination, pain with urgency, incontinence, retention, or nocturia.  Musculoskeletal: Patient denies increased warmth; redness; or swelling of joints; limitation of function; deformity; crepitation: pain in a joint or an extremity, the neck, or the back, especially with movement.  Neurological: Patient denies dizziness, tremor, ataxia, difficulty in speaking, change in speech, paresthesia, loss of sensation, seizures, syncope, changes in memory.  Endocrine system: Patient denies tremors, palpitations, intolerance of heat or cold, polyuria, polydipsia, polyphagia,  diaphoresis, exophthalmos, or goiter.  Psychological: Patient denies thoughts/plans of harming self or other; depression,  insomnia, night terrors, frank, memory loss, disorientation.  Skin: Patient denies any bruising, rashes, discoloration, pruritus, wounds, ulcers, decubiti, changes in the hair or nails  Hematopoietic: Patient denies history of spontaneous or excessive bleeding, epistaxis, hematuria, melena, fatigue, enlarged or tender lymph nodes, pallor, history of anemia.    Physical Exam: 77 y.o. male  Vitals:    09/28/22 1539 09/28/22 1938 09/28/22 2106 09/28/22 2110   BP: 132/74 132/70     BP Location:  Left arm     Patient Position:  Lying     Pulse: 82 80 70 70   Resp: 20 20 20 20   Temp: 98.7 °F (37.1 °C) 97.9 °F (36.6 °C)     TempSrc:  Oral     SpO2: 98% 90% 100% 100%   Weight:       Height:         General Appearance:    Alert, cooperative, in no acute distress                      Head:  Normocephalic, without obvious abnormality, atraumatic   Eyes:          Lids and lashes normal, conjunctivae and sclerae normal, no icterus, no pallor, corneas clear, PERRLA   Ears:  Ears appear intact with no abnormalities noted   Throat: No oral lesions, no thrush, oral mucosa moist   Neck: No adenopathy, supple, trachea midline, no thyromegaly, no carotid bruit, no JVD   Back:   No kyphosis present, no scoliosis present, no skin lesions,    erythema or scars, no tenderness to percussion or                   palpation, range of motion normal   Lungs:   Clear to auscultation,respirations regular, even and                unlabored    Heart:  Regular rhythm and normal rate, normal S1 and S2, no         murmur, no gallop, no rub, no click   Chest Wall:  No abnormalities observed   Abdomen:   Normal bowel sounds, no masses, no organomegaly, soft     nontender, nondistended, no guarding, no rebound                tenderness   Rectal:    Deferred   Extremities: Tenderness noted at the dorsal aspect of the left elbow on the  tip of the olecranon process.  Moves all extremities well, no       edema, no cyanosis, no redness   Pulses: Pulses palpable and equal bilaterally   Skin: No bleeding, bruising or rash   Lymph nodes: No palpable adenopathy   Neurologic: Cranial nerves 2 - 12 grossly intact, sensation intact, DTR     present and equal bilaterally   left elbow.  Dorsal / posterior  Relationship of 3 bony points is well preserved. There is no evidence of posterior interosseous nerve palsy. Mild effusion is noted of the elbow. There is no ulnar neuritis. Patient has a lot of pain and tenderness over the lateral/medial aspect of the elbow. Range of motion of the elbow is 0-130 degrees of flexion, 0-90 degrees of pronation, 0-90 degrees of supination. Extension of the wrist against resistance bothers the patient significantly. Radial artery pulses are palpable. Skin and soft tissues are slightly swollen. There is point tenderness over the flexor pronator muscle mass/extensor supinator muscle mass.         Diagnostic Tests:  Admission on 09/27/2022   Component Date Value Ref Range Status    Glucose 09/27/2022 146 (A) 65 - 99 mg/dL Final    BUN 09/27/2022 22  8 - 23 mg/dL Final    Creatinine 09/27/2022 1.44 (A) 0.76 - 1.27 mg/dL Final    Sodium 09/27/2022 136  136 - 145 mmol/L Final    Potassium 09/27/2022 3.0 (A) 3.5 - 5.2 mmol/L Final    Chloride 09/27/2022 95 (A) 98 - 107 mmol/L Final    CO2 09/27/2022 29.0  22.0 - 29.0 mmol/L Final    Calcium 09/27/2022 7.7 (A) 8.6 - 10.5 mg/dL Final    Total Protein 09/27/2022 6.6  6.0 - 8.5 g/dL Final    Albumin 09/27/2022 3.10 (A) 3.50 - 5.20 g/dL Final    ALT (SGPT) 09/27/2022 7  1 - 41 U/L Final    AST (SGOT) 09/27/2022 20  1 - 40 U/L Final    Alkaline Phosphatase 09/27/2022 109  39 - 117 U/L Final    Total Bilirubin 09/27/2022 1.0  0.0 - 1.2 mg/dL Final    Globulin 09/27/2022 3.5  gm/dL Final    A/G Ratio 09/27/2022 0.9  g/dL Final    BUN/Creatinine Ratio 09/27/2022 15.3  7.0 - 25.0 Final    Anion  Gap 09/27/2022 12.0  5.0 - 15.0 mmol/L Final    eGFR 09/27/2022 50.4 (A) >60.0 mL/min/1.73 Final    National Kidney Foundation and American Society of Nephrology (ASN) Task Force recommended calculation based on the Chronic Kidney Disease Epidemiology Collaboration (CKD-EPI) equation refit without adjustment for race.    Wound Culture 09/27/2022 Growth present, too young to evaluate   Preliminary    Gram Stain 09/27/2022 Rare (1+) WBCs seen   Preliminary    Gram Stain 09/27/2022 Rare (1+) Gram positive cocci   Preliminary    Color, UA 09/27/2022 Yellow  Yellow, Straw Final    Appearance, UA 09/27/2022 Hazy (A) Clear Final    pH, UA 09/27/2022 <=5.0  5.0 - 8.0 Final    Specific Gravity, UA 09/27/2022 1.014  1.005 - 1.030 Final    Glucose, UA 09/27/2022 Negative  Negative Final    Ketones, UA 09/27/2022 Negative  Negative Final    Bilirubin, UA 09/27/2022 Negative  Negative Final    Blood, UA 09/27/2022 Negative  Negative Final    Protein, UA 09/27/2022 30 mg/dL (1+) (A) Negative Final    Leuk Esterase, UA 09/27/2022 Negative  Negative Final    Nitrite, UA 09/27/2022 Negative  Negative Final    Urobilinogen, UA 09/27/2022 1.0 E.U./dL  0.2 - 1.0 E.U./dL Final    WBC 09/27/2022 8.70  3.40 - 10.80 10*3/mm3 Final    RBC 09/27/2022 3.69 (A) 4.14 - 5.80 10*6/mm3 Final    Hemoglobin 09/27/2022 8.2 (A) 13.0 - 17.7 g/dL Final    Hematocrit 09/27/2022 26.8 (A) 37.5 - 51.0 % Final    MCV 09/27/2022 72.7 (A) 79.0 - 97.0 fL Final    MCH 09/27/2022 22.1 (A) 26.6 - 33.0 pg Final    MCHC 09/27/2022 30.4 (A) 31.5 - 35.7 g/dL Final    RDW 09/27/2022 24.1 (A) 12.3 - 15.4 % Final    RDW-SD 09/27/2022 61.3 (A) 37.0 - 54.0 fl Final    MPV 09/27/2022 7.9  6.0 - 12.0 fL Final    Platelets 09/27/2022 290  140 - 450 10*3/mm3 Final    Neutrophil % 09/27/2022 73.7  42.7 - 76.0 % Final    Lymphocyte % 09/27/2022 9.5 (A) 19.6 - 45.3 % Final    Monocyte % 09/27/2022 15.0 (A) 5.0 - 12.0 % Final    Eosinophil % 09/27/2022 1.0  0.3 - 6.2 % Final     Basophil % 09/27/2022 0.8  0.0 - 1.5 % Final    Neutrophils, Absolute 09/27/2022 6.40  1.70 - 7.00 10*3/mm3 Final    Lymphocytes, Absolute 09/27/2022 0.80  0.70 - 3.10 10*3/mm3 Final    Monocytes, Absolute 09/27/2022 1.30 (A) 0.10 - 0.90 10*3/mm3 Final    Eosinophils, Absolute 09/27/2022 0.10  0.00 - 0.40 10*3/mm3 Final    Basophils, Absolute 09/27/2022 0.10  0.00 - 0.20 10*3/mm3 Final    nRBC 09/27/2022 0.1  0.0 - 0.2 /100 WBC Final    Blood Culture 09/27/2022 No growth at 24 hours   Preliminary    Blood Culture 09/27/2022 No growth at 24 hours   Preliminary    C-Reactive Protein 09/27/2022 19.51 (A) 0.00 - 0.50 mg/dL Final    Sed Rate 09/27/2022 27 (A) 0 - 20 mm/hr Final    Glucose 09/27/2022 119 (A) 70 - 105 mg/dL Final    Serial Number: [id number]:  [id number]    Lactate 09/27/2022 1.3  0.5 - 2.0 mmol/L Final    Serial Number: [id number]:  [id number]    RBC, UA 09/27/2022 None Seen  None Seen /HPF Final    WBC, UA 09/27/2022 None Seen  None Seen /HPF Final    Bacteria, UA 09/27/2022 1+ (A) None Seen /HPF Final    Squamous Epithelial Cells, UA 09/27/2022 0-2  None Seen, 0-2 /HPF Final    Hyaline Casts, UA 09/27/2022 0-2  None Seen /LPF Final    Mucus, UA 09/27/2022 Small/1+ (A) None Seen, Trace /HPF Final    Methodology 09/27/2022 Manual Light Microscopy   Final    Magnesium 09/27/2022 1.5 (A) 1.6 - 2.4 mg/dL Final    Glucose 09/28/2022 170 (A) 65 - 99 mg/dL Final    BUN 09/28/2022 24 (A) 8 - 23 mg/dL Final    Creatinine 09/28/2022 1.42 (A) 0.76 - 1.27 mg/dL Final    Sodium 09/28/2022 136  136 - 145 mmol/L Final    Potassium 09/28/2022 3.2 (A) 3.5 - 5.2 mmol/L Final    Chloride 09/28/2022 96 (A) 98 - 107 mmol/L Final    CO2 09/28/2022 27.0  22.0 - 29.0 mmol/L Final    Calcium 09/28/2022 7.8 (A) 8.6 - 10.5 mg/dL Final    BUN/Creatinine Ratio 09/28/2022 16.9  7.0 - 25.0 Final    Anion Gap 09/28/2022 13.0  5.0 - 15.0 mmol/L Final    eGFR 09/28/2022 51.2 (A) >60.0 mL/min/1.73 Final    National Kidney  Foundation and American Society of Nephrology (ASN) Task Force recommended calculation based on the Chronic Kidney Disease Epidemiology Collaboration (CKD-EPI) equation refit without adjustment for race.    Magnesium 09/28/2022 1.5 (A) 1.6 - 2.4 mg/dL Final    WBC 09/28/2022 8.50  3.40 - 10.80 10*3/mm3 Final    RBC 09/28/2022 3.52 (A) 4.14 - 5.80 10*6/mm3 Final    Hemoglobin 09/28/2022 7.9 (A) 13.0 - 17.7 g/dL Final    Hematocrit 09/28/2022 26.3 (A) 37.5 - 51.0 % Final    MCV 09/28/2022 74.6 (A) 79.0 - 97.0 fL Final    MCH 09/28/2022 22.4 (A) 26.6 - 33.0 pg Final    MCHC 09/28/2022 30.0 (A) 31.5 - 35.7 g/dL Final    RDW 09/28/2022 23.5 (A) 12.3 - 15.4 % Final    RDW-SD 09/28/2022 60.4 (A) 37.0 - 54.0 fl Final    MPV 09/28/2022 8.4  6.0 - 12.0 fL Final    Platelets 09/28/2022 286  140 - 450 10*3/mm3 Final    Scan Slide 09/28/2022    Final    See Manual Differential Results    Neutrophil % 09/28/2022 59.0  42.7 - 76.0 % Final    Lymphocyte % 09/28/2022 13.0 (A) 19.6 - 45.3 % Final    Monocyte % 09/28/2022 12.0  5.0 - 12.0 % Final    Eosinophil % 09/28/2022 1.0  0.3 - 6.2 % Final    Basophil % 09/28/2022 2.0 (A) 0.0 - 1.5 % Final    Bands %  09/28/2022 11.0 (A) 0.0 - 5.0 % Final    Atypical Lymphocyte % 09/28/2022 2.0  0.0 - 5.0 % Final    Neutrophils Absolute 09/28/2022 5.95  1.70 - 7.00 10*3/mm3 Final    Lymphocytes Absolute 09/28/2022 1.28  0.70 - 3.10 10*3/mm3 Final    Monocytes Absolute 09/28/2022 1.02 (A) 0.10 - 0.90 10*3/mm3 Final    Eosinophils Absolute 09/28/2022 0.09  0.00 - 0.40 10*3/mm3 Final    Basophils Absolute 09/28/2022 0.17  0.00 - 0.20 10*3/mm3 Final    Anisocytosis 09/28/2022 Mod/2+  None Seen Final    Elliptocytes 09/28/2022 Mod/2+  None Seen Final    Poikilocytes 09/28/2022 Mod/2+  None Seen Final    WBC Morphology 09/28/2022 Normal  Normal Final    Platelet Morphology 09/28/2022 Normal  Normal Final    Vancomycin Random 09/28/2022 15.50  5.00 - 40.00 mcg/mL Final     No results  found.    Assessment:  Patient Active Problem List   Diagnosis    Skin ulcer (HCC)    Chronic low back pain    DDD (degenerative disc disease), lumbar    Spondylosis of lumbosacral region    Atrial fibrillation (HCC)    Chronic obstructive pulmonary disease (HCC)    Atherosclerosis of native coronary artery of native heart with angina pectoris (HCC)    Gastroesophageal reflux disease    Lumbar radiculopathy    Other hammer toe(s) (acquired), right foot    Presence of cardiac pacemaker    Status post coronary artery stent placement    COPD with exacerbation (HCC)    Stented coronary artery    Small bowel obstruction due to adhesions (HCC)    Elevated lipoprotein(a)    Acute generalized abdominal pain    Diabetic foot ulcer (HCC)    Ventral hernia with obstruction but no gangrene    DM type 2 with diabetic dyslipidemia (HCC)    Peripheral vascular disease due to secondary diabetes (HCC)    Benign hypertension with CKD (chronic kidney disease) stage III (HCC)    Stage 3a chronic kidney disease (HCC)    Type 2 diabetes, controlled, with neuropathy (HCC)    Chronic bronchitis with COPD (chronic obstructive pulmonary disease) (HCC)    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)    Chronic respiratory failure with hypoxia (HCC)    Diabetic ulcer of right midfoot associated with type 2 diabetes mellitus (HCC)    Dyspnea on exertion    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)    Ischemic ulcer of foot with fat layer exposed, right (HCC)    Right foot ulcer (HCC)    Arthritis    CHF due to valvular disease (HCC)    Hiatal hernia    Presence of coronary angioplasty implant and graft    CAD (coronary artery disease)    Chronic obstructive bronchitis (HCC)    Degeneration of lumbar intervertebral disc    Ischemic ulcer of foot (HCC)    Diabetes mellitus with coincident hypertension (HCC)    Benign hypertension with chronic kidney disease    Acquired hallux malleus    Peripheral vascular disease due to  secondary diabetes mellitus (HCC)    Obstruction of small intestine due to peritoneal adhesion (HCC)    Lumbosacral spondylosis    Obstructed ventral hernia    Foot ulceration, right, with necrosis of bone (HCC)    Ulcer of right foot with bone involvement without evidence of necrosis (HCC)    Diarrhea    Abdominal bloating    Acute abdominal pain    AV block, complete (HCC)    Sepsis, unspecified    Cellulitis of abdominal wall    Angina of effort (HCC)    Chronic renal insufficiency    Long term current use of anticoagulants with INR goal of 2.0-3.0    Alcoholic cirrhosis of liver without ascites (HCC)    Pneumonia due to infectious organism, unspecified laterality, unspecified part of lung    Non-healing surgical wound    Long term (current) use of inhaled steroids    Personal history of nicotine dependence    Chronic systolic heart failure (CMS/HCC)    Dyspnea, unspecified type    Pneumonia of right lung due to infectious organism, unspecified part of lung    (HFpEF) heart failure with preserved ejection fraction (HCC)    Chronic low back pain    Steroid-induced hyperglycemia    Positive blood culture    Acute systolic heart failure (CMS/HCC)    Unstable angina (HCC)    Upper GI bleed    Acute on chronic systolic heart failure (CMS/HCC)    Dependence on supplemental oxygen    SBO (small bowel obstruction) (HCC)    Wound infection    Elbow pain, left    Olecranon bursitis of left elbow           Plan:  The patient voiced understanding of the risks, benefits, and alternative forms of treatment that were discussed and the patient consents to proceed with nonoperative management of the olecranon bursitis.  Local application of an Ace wrap from the mid forearm to the mid arm.  Ice to the area for comfort.  Tablet ibuprofen 800 mg orally twice daily for pain swelling discomfort.  Follow-up in the office in 1 week.  Nonoperative management for this condition discussed with the patient.       Discharge Plan: tomorrow to  home      Date: 9/28/2022    Jozef Mosley MD      Time: Critical care 20 min     DICTATED UTILIZING DRAGON DICTATION

## 2022-09-29 NOTE — CONSULTS
Infectious Diseases Consult Note    Referring Provider: Yusuf Jones MD    Reason for Consultation: Left elbow infection    Patient Care Team:  Yusuf Jones MD as PCP - Joshua Evangelista MD as Consulting Physician (Cardiology)  Moise Watson MD as Consulting Physician (Cardiac Electrophysiology)  Izzy Alvarez MD as Consulting Physician (Nephrology)  Celine Swanson MD as Consulting Physician (Cardiology)    Chief complaint severe left elbow pain with fever and chills    Subjective     Patient's been afebrile since admission.  He is currently on room air, hemodynamically stable and tolerating antimicrobial therapy.    History of present illness:      This is a 77-year-old male presents the hospital on 9/27/2022 with severe left elbow pain that started several days ago.  He is also had some fever and chills at home as high as 102 degrees.  Patient stated he fell 5 or 6 years ago and did have surgery on that elbow and thinks that there is hardware implanted.  Patient said he was recently admitted and discharged for bowel obstruction.  Patient mitts to fever and chills, mild shortness of breath without productive cough, denies GI symptoms or any urinary symptoms.    Patient is currently on room air but does not appear to be in acute distress.  He has been afebrile since admission.  Patient has a creatinine of 1.22, white blood cell count of 8.3, he will 9.7 platelets of 335.  CRP is 19.51 and sed rate is 27.  Urinalysis was not remarkable and blood cultures are negative so far apparently there is a wound culture from the left leg that is pending.  X-ray shows olecranial bursitis.  Patient is currently on IV Rocephin and IV vancomycin and has no known antibiotic allergies    Review of Systems   Review of Systems   Constitutional: Positive for chills and fever.   HENT: Negative.    Eyes: Negative.    Respiratory: Negative.    Cardiovascular: Negative.    Gastrointestinal:  Negative.    Endocrine: Negative.    Genitourinary: Negative.    Musculoskeletal: Positive for joint swelling.        Left elbow pain and swelling   Skin: Negative.    Neurological: Negative.    Psychiatric/Behavioral: Negative.    All other systems reviewed and are negative.      Medications  Medications Prior to Admission   Medication Sig Dispense Refill Last Dose   • apixaban (ELIQUIS) 5 MG tablet tablet Take 5 mg by mouth 2 (Two) Times a Day.   9/27/2022 at Unknown time   • aspirin 81 MG EC tablet Take 1 tablet by mouth 3 (Three) Times a Week. 30 tablet 5 9/27/2022 at Unknown time   • atorvastatin (LIPITOR) 40 MG tablet Take 40 mg by mouth Every Morning.   9/27/2022 at Unknown time   • famotidine (PEPCID) 20 MG tablet Take 1 tablet by mouth 2 (Two) Times a Day Before Meals. 60 tablet 0 9/27/2022 at Unknown time   • furosemide (LASIX) 40 MG tablet Take 40 mg by mouth Every Morning.   9/27/2022 at Unknown time   • isosorbide mononitrate (IMDUR) 120 MG 24 hr tablet Take 1 tablet by mouth Daily. 30 tablet 3 9/27/2022 at Unknown time   • metoprolol tartrate (LOPRESSOR) 50 MG tablet Take 50 mg by mouth 2 (Two) Times a Day With Meals.   9/27/2022 at Unknown time   • mirtazapine (REMERON) 30 MG tablet Take 30 mg by mouth Every Night.   9/27/2022 at Unknown time   • oxyCODONE-acetaminophen (Percocet)  MG per tablet Take 1 tablet by mouth Every 6 (Six) Hours As Needed for Moderate Pain . 120 tablet 0 9/27/2022 at Unknown time   • pregabalin (LYRICA) 150 MG capsule Take 150 mg by mouth 2 (Two) Times a Day.   9/27/2022 at Unknown time   • ranolazine (RANEXA) 1000 MG 12 hr tablet Take 1,000 mg by mouth Every 12 (Twelve) Hours.   9/27/2022 at Unknown time   • rOPINIRole (REQUIP) 1 MG tablet Take 1 mg by mouth Daily.   9/27/2022 at Unknown time   • traZODone (DESYREL) 100 MG tablet Take 100 mg by mouth Every Night. After meals   9/27/2022 at Unknown time   • vitamin D (ERGOCALCIFEROL) 1.25 MG (00111 UT) capsule capsule  Take 50,000 Units by mouth 1 (One) Time Per Week. Friday 9/27/2022 at Unknown time   • budesonide (PULMICORT) 0.5 MG/2ML nebulizer solution Take 2 mL by nebulization 2 (Two) Times a Day. 120 mL 6    • docusate sodium (COLACE) 100 MG capsule Take 200 mg by mouth 2 (Two) Times a Day.      • hydrOXYzine (ATARAX) 25 MG tablet Take 25 mg by mouth At Night As Needed.   Unknown at Unknown time   • ipratropium-albuterol (DUO-NEB) 0.5-2.5 mg/3 ml nebulizer Take 3 mL by nebulization 4 (Four) Times a Day As Needed.   Unknown at Unknown time   • metOLazone (ZAROXOLYN) 5 MG tablet Take 5 mg by mouth 3 (Three) Times a Week. Monday, Wednesday, Friday    As needed for weight gain.      • nitroglycerin (NITROSTAT) 0.4 MG SL tablet Place 0.4 mg under the tongue Every 5 (Five) Minutes As Needed for Chest Pain.      • VENTOLIN  (90 Base) MCG/ACT inhaler Inhale 2 puffs Every 4 (Four) Hours As Needed for Wheezing or Shortness of Air. May use every 4 to 6 hours   Unknown at Unknown time       History  Past Medical History:   Diagnosis Date   • Alcoholic cirrhosis of liver with ascites ([facility]) 10/26/2020   • Benign hypertension with CKD (chronic kidney disease) stage III ([facility]) 10/26/2020   • Bruises easily    • CHF (congestive heart failure) ([facility])    • Chronic bronchitis with COPD (chronic obstructive pulmonary disease) ([facility]) 10/26/2020   • Chronic respiratory failure with hypoxia ([facility]) 10/26/2020   • Congenital heart defect    • Difficulty attaining erection    • Heart block    • Hernia of abdominal wall    • Hypertension    • Low back pain    • Pacemaker    • Peripheral vascular disease due to secondary diabetes ([facility]) 10/26/2020   • Poor circulation    • Prostate cancer ([facility])     prostate   • Shortness of breath    • Sleep apnea     using CPAP    • Stage 3a chronic kidney disease ([facility]) 10/26/2020   • Stented coronary artery 12/05/2019    MICHAEL to LAD   • Type 2 diabetes, controlled, with neuropathy ([facility]) 10/26/2020    no meds   •  Type 2 diabetes, controlled, with neuropathy (HCC) 10/26/2020     Past Surgical History:   Procedure Laterality Date   • ABDOMINAL SURGERY     • APPENDECTOMY     • BONE CURETTAGE Right 1/22/2021    Procedure: Wound excision with fifth metatarsal head resection, right foot.;  Surgeon: Josef Egan DPM;  Location: [facility] MAIN OR;  Service: Podiatry;  Laterality: Right;   • BONE INCISION AND DRAINAGE Right 10/5/2020    Procedure: Incision and drainage with debridement of all nonviable soft tissue and bone with bone biopsy, right foot.;  Surgeon: Josef Egan DPM;  Location: [facility] MAIN OR;  Service: Podiatry;  Laterality: Right;   • BRONCHOSCOPY N/A 4/23/2022    Procedure: BRONCHOSCOPY with bronchioalveolar lavage of right lower lobe;  Surgeon: Edwin Kline MD;  Location: [facility] ENDOSCOPY;  Service: Pulmonary;  Laterality: N/A;  pneumonia   • CARDIAC CATHETERIZATION N/A 12/5/2019    Procedure: Left Heart Cath;  Surgeon: Mirza Munson MD;  Location: [facility] CATH INVASIVE LOCATION;  Service: Cardiology   • CARDIAC CATHETERIZATION N/A 12/5/2019    Procedure: Stent MICHAEL coronary;  Surgeon: Mirza Munson MD;  Location: [facility] CATH INVASIVE LOCATION;  Service: Cardiology   • CARDIAC CATHETERIZATION N/A 10/18/2021    Procedure: Left Heart Cath and coronary angiogram;  Surgeon: Celine Swanson MD;  Location: [facility] CATH INVASIVE LOCATION;  Service: Cardiovascular;  Laterality: N/A;   • CARDIAC CATHETERIZATION N/A 10/18/2021    Procedure: Right Heart Cath;  Surgeon: Celine Swanson MD;  Location: [facility] CATH INVASIVE LOCATION;  Service: Cardiovascular;  Laterality: N/A;   • CHOLECYSTECTOMY     • ELBOW PROCEDURE      left   • ENDOSCOPY N/A 7/6/2022    Procedure: ESOPHAGOGASTRODUODENOSCOPY with biopsy x 1 area. bicap cautery, scelero therapy.;  Surgeon: Douglas Delgadillo MD;  Location: [facility] ENDOSCOPY;  Service: Gastroenterology;  Laterality: N/A;  ESOPHAGITIS, ANTREL ULCER, DUODENAL ULCER    • FOOT SURGERY      right foot   • HERNIA REPAIR     • INCISION AND DRAINAGE OF WOUND Right 4/6/2021    Procedure: INCISION AND DRAINAGE OF RIGHT FOOT 5TH METATARSAL TO BONE AND PARTIAL 5TH METATARSAL RESECTION;  Surgeon: Josef Egan DPM;  Location: [facility] MAIN OR;  Service: Podiatry;  Laterality: Right;   • INCISION AND DRAINAGE OF WOUND Right 4/8/2021    Procedure: INCISION AND DRAINAGE BONE, DELAYED PRIMARY CLOSURE;  Surgeon: Josef Egan DPM;  Location: [facility] MAIN OR;  Service: Podiatry;  Laterality: Right;   • INTERVENTIONAL RADIOLOGY PROCEDURE N/A 12/5/2019    Procedure: Intravascular Ultrasound;  Surgeon: Mirza Munson MD;  Location: [facility] CATH INVASIVE LOCATION;  Service: Cardiology   • PACEMAKER IMPLANTATION     • NE RT/LT HEART CATHETERS N/A 12/5/2019    Procedure: Percutaneous Coronary Intervention;  Surgeon: Mirza Munson MD;  Location: [facility] CATH INVASIVE LOCATION;  Service: Cardiology   • WOUND DEBRIDEMENT Right 10/29/2020    Procedure: Preparation and debridement of foot wound with application of Integra wound graft, right foot.;  Surgeon: Josef Egan DPM;  Location: [facility] MAIN OR;  Service: Podiatry;  Laterality: Right;   • WOUND DEBRIDEMENT N/A 7/29/2021    Procedure: EXCISIONAL ABDOMINAL WOUND  DEBRIDEMENT;  Surgeon: Cleopatra Wang MD;  Location: [facility] OR;  Service: General;  Laterality: N/A;   • WOUND DEBRIDEMENT N/A 8/1/2021    Procedure: DEBRIDEMENT OF ABDOMINAL WALL;  Surgeon: Hill Bhatia DO;  Location: [facility] MAIN OR;  Service: General;  Laterality: N/A;       Family History  Family History   Problem Relation Age of Onset   • Alzheimer's disease Mother    • GI problems Father    • Heart disease Father        Social History   reports that he quit smoking about 2 years ago. His smoking use included cigarettes. He started smoking about 56 years ago. He quit after 15.00 years of use. He has never used smokeless tobacco. He reports  current alcohol use of about 4.0 standard drinks of alcohol per week. He reports that he does not use drugs.    Allergies  Haloperidol, Morphine, Pantoprazole, and Latex    Objective     Vital Signs   Vital Signs (last 24 hours)       09/28 0700  09/29 0659 09/29 0700  09/29 1355   Most Recent      Temp (°F) 97.9 -  98.7    97.6 -  98.4     98.4 (36.9) 09/29 1138    Heart Rate 69 -  82    69 -  72     72 09/29 1138    Resp 18 -  20      16     16 09/29 1138    /63 -  132/74    105/73 -  112/68     112/68 09/29 1138    SpO2 (%) 90 -  100    99 -  100     99 09/29 1138          Physical Exam:  Physical Exam  Vitals and nursing note reviewed.   Constitutional:       General: He is not in acute distress.     Appearance: He is well-developed. He is obese. He is ill-appearing. He is not diaphoretic.   HENT:      Head: Normocephalic and atraumatic.   Eyes:      Extraocular Movements: Extraocular movements intact.      Conjunctiva/sclera: Conjunctivae normal.      Pupils: Pupils are equal, round, and reactive to light.   Cardiovascular:      Rate and Rhythm: Normal rate and regular rhythm.      Heart sounds: Normal heart sounds, S1 normal and S2 normal.   Pulmonary:      Effort: Pulmonary effort is normal. No respiratory distress.      Breath sounds: No stridor. No wheezing or rales.      Comments: Diminished throughout  Abdominal:      General: Bowel sounds are normal. There is no distension.      Palpations: Abdomen is soft. There is no mass.      Tenderness: There is no abdominal tenderness. There is no guarding.      Comments: Healed midline abdominal wound   Musculoskeletal:         General: No deformity. Normal range of motion.      Cervical back: Neck supple.      Comments: Patient has some erythema with a large area of fluctuance at the right elbow-very tender to palpation and patient has very limited range of motion   Skin:     General: Skin is warm and dry.      Coloration: Skin is not pale.      Findings:  No erythema or rash.      Comments: Multiple very superficial wounds to the left lower leg that are weeping    Several very small scabs to bilateral feet-nothing that looks actively infected   Neurological:      Mental Status: He is alert and oriented to person, place, and time.      Cranial Nerves: No cranial nerve deficit.   Psychiatric:         Mood and Affect: Mood normal.         Microbiology  Microbiology Results (last 10 days)     Procedure Component Value - Date/Time    Blood Culture - Blood, Wrist, Right [[id number]]  (Normal) Collected: 09/28/22 1052    Lab Status: Preliminary result Specimen: Blood from Wrist, Right Updated: 09/29/22 1117     Blood Culture No growth at 24 hours    Blood Culture - Blood, Wrist, Right [[id number]]  (Normal) Collected: 09/27/22 2045    Lab Status: Preliminary result Specimen: Blood from Wrist, Right Updated: 09/28/22 2102     Blood Culture No growth at 24 hours    Blood Culture - Blood, Arm, Right [[id number]]  (Normal) Collected: 09/27/22 2045    Lab Status: Preliminary result Specimen: Blood from Arm, Right Updated: 09/28/22 2102     Blood Culture No growth at 24 hours    Wound Culture - Drainage, Leg, Left [[id number]] Collected: 09/27/22 1935    Lab Status: Preliminary result Specimen: Drainage from Leg, Left Updated: 09/28/22 0942     Wound Culture Growth present, too young to evaluate     Gram Stain Rare (1+) WBCs seen      Rare (1+) Gram positive cocci          Laboratory  Results from last 7 days   Lab Units 09/29/22  0141   WBC 10*3/mm3 8.30   HEMOGLOBIN g/dL 7.9*   HEMATOCRIT % 25.7*   PLATELETS 10*3/mm3 335     Results from last 7 days   Lab Units 09/29/22  0141   SODIUM mmol/L 135*   POTASSIUM mmol/L 3.5   CHLORIDE mmol/L 94*   CO2 mmol/L 28.0   BUN mg/dL 21   CREATININE mg/dL 1.22   GLUCOSE mg/dL 173*   CALCIUM mg/dL 8.2*     Results from last 7 days   Lab Units 09/29/22  0141   SODIUM mmol/L 135*   POTASSIUM mmol/L 3.5   CHLORIDE mmol/L 94*   CO2 mmol/L 28.0   BUN  mg/dL 21   CREATININE mg/dL 1.22   GLUCOSE mg/dL 173*   CALCIUM mg/dL 8.2*         Results from last 7 days   Lab Units 09/27/22  1932   SED RATE mm/hr 27*           Radiology  Imaging Results (Last 72 Hours)     Procedure Component Value Units Date/Time    XR ELBOW 2 VIEW LEFT [[id number]] Collected: 09/27/22 2018     Updated: 09/27/22 2022    Narrative:      DATE OF EXAM:  9/27/2022 7:56 PM     PROCEDURE:  XR ELBOW 2 VW LEFT-     INDICATIONS:  elbow pain; M25.522-Pain in left elbow; M70.22-Olecranon bursitis, left  elbow; T14.8XXA-Other injury of unspecified body region, initial  encounter; L08.9-Local infection of the skin and subcutaneous tissue,  unspecified     COMPARISON:  05/20/2016     TECHNIQUE:   Two Radiologic views of the left elbow were obtained.     FINDINGS:  There is soft tissue swelling overlying the olecranon. There is a single  fixation screw with cerclage wires again noted at the proximal ulna  extending to the olecranon unchanged in position from prior study. There  are soft tissue calcifications overlying the posterior soft tissues at  level of the distal triceps similar to prior exam. There is no acute  fracture. No definite joint effusion.        Impression:      1. Negative for fracture.  2. Soft tissue swelling overlying olecranon most suggestive of olecranon  bursitis.  3. Chronic changes related to prior ORIF with distal ulnar screw  fixation and chronic soft tissue calcifications overlying the posterior  aspect of the elbow.     Electronically Signed By-Valente Hoover MD On:9/27/2022 8:20 PM  This report was finalized on [id number] by  Valente Hoover MD.          Cardiology      Results Review:  I have reviewed all clinical data, test, lab, and imaging results.       Schedule Meds  apixaban, 5 mg, Oral, Q12H  aspirin, 81 mg, Oral, Once per day on Mon Wed Fri  atorvastatin, 40 mg, Oral, Daily  budesonide, 0.5 mg, Nebulization, BID - RT  cefTRIAXone, 2 g, Intravenous, Q24H  docusate  sodium, 200 mg, Oral, BID  famotidine, 20 mg, Oral, BID AC  furosemide, 40 mg, Oral, Daily  isosorbide mononitrate, 120 mg, Oral, Q24H  metOLazone, 5 mg, Oral, Once per day on Mon Wed Fri  metoprolol tartrate, 50 mg, Oral, BID With Meals  mirtazapine, 30 mg, Oral, Nightly  nystatin, , Topical, Q12H  pregabalin, 150 mg, Oral, BID  ranolazine, 1,000 mg, Oral, Q12H  rOPINIRole, 1 mg, Oral, Nightly  sodium chloride, 3 mL, Intravenous, Q12H  traZODone, 100 mg, Oral, Nightly  [START ON 9/30/2022] vancomycin, 1,750 mg, Intravenous, Q24H        Infusion Meds  Pharmacy to dose vancomycin,         PRN Meds  benzonatate  •  HYDROmorphone  •  hydrOXYzine  •  ipratropium-albuterol  •  magnesium sulfate **OR** magnesium sulfate **OR** magnesium sulfate  •  nitroglycerin  •  ondansetron **OR** ondansetron  •  oxyCODONE  •  Pharmacy to dose vancomycin  •  potassium chloride **OR** potassium chloride **OR** potassium chloride  •  [COMPLETED] Insert peripheral IV **AND** sodium chloride  •  sodium chloride      Assessment & Plan       Assessment    Presentation concerning for left septic olecranon bursitis.  Patient denies any trauma or injections to that area and states that the pain started spontaneously several days before admission.  There is some erythema, acute tendernes, s and a large area of fluctuance just above the elbow joint.  Patient has limited range of motion due to the pain  -Admits to previous surgery on that elbow with hardware 5 or 6 years ago after a fall  -Current x-ray shows a olecranon bursitis  -Initial blood cultures are negative  -he reports fever at home but has been afebrile since admission    Superficial wounds to the left lower leg with weeping-likely due to chronic edema/venous stasis.  Cultures currently growing Corynebacterium.  It is likely skin bacteria colonization  -History of peripheral vascular disease    Recent admission for bowel obstruction without surgery    Our service last saw the patient  in August 2021 for MRSA bacteremia with a abdominal wound infected with MRSA.  Received 4 weeks of IV vancomycin    History of pacemaker placement, congestive heart failure, CAD    History of alcohol cirrhosis    Chronic kidney disease stage III    Type 2 diabetes    COPD    Plan    Continue IV Rocephin and IV vancomycin for now  Orthopedic surgery has been consulted-patient will likely need some kind of incision and drainage  CT of the left elbow without contrast-patient cannot have an MRI due to the pacemaker  Continue supportive care  A.mThania Green, APRN  09/29/22  13:55 EDT    Note is dictated utilizing voice recognition software/Dragon

## 2022-09-29 NOTE — PROGRESS NOTES
LOS: 2 days   Patient Care Team:  Yusuf Jones MD as PCP - [facility]  PhiJoshua MD as Consulting Physician (Cardiology)  Moise Watson MD as Consulting Physician (Cardiac Electrophysiology)  Izzy Alvarez MD as Consulting Physician (Nephrology)  Celine Swanson MD as Consulting Physician (Cardiology)    Subjective:  Pain//elbow looks better overall per patient    Objective:   Improvement in edema and erythema//afebrile      Review of Systems:   Review of Systems   Constitutional: Positive for activity change.   Respiratory: Negative.    Cardiovascular: Negative.            Vital Signs  Temp:  [97.9 °F (36.6 °C)-98.7 °F (37.1 °C)] 97.9 °F (36.6 °C)  Heart Rate:  [69-82] 71  Resp:  [18-20] 18  BP: (102-132)/(63-74) 110/66    Physical Exam:  Physical Exam  Vitals and nursing note reviewed.   Constitutional:       Appearance: Normal appearance.   Cardiovascular:      Rate and Rhythm: Normal rate.      Heart sounds: Normal heart sounds.   Pulmonary:      Breath sounds: Normal breath sounds.   Abdominal:      Palpations: Abdomen is soft.   Skin:     General: Skin is warm.   Neurological:      Mental Status: He is alert.          Radiology:  XR ELBOW 2 VIEW LEFT    Result Date: 9/27/2022  1. Negative for fracture. 2. Soft tissue swelling overlying olecranon most suggestive of olecranon bursitis. 3. Chronic changes related to prior ORIF with distal ulnar screw fixation and chronic soft tissue calcifications overlying the posterior aspect of the elbow.  Electronically Signed By-Valente Hoover MD On:9/27/2022 8:20 PM This report was finalized on [id number] by  Valente Hoover MD.         Results Review:     I reviewed the patient's new clinical results.  I reviewed the patient's new imaging results and agree with the interpretation.    Medication Review:   Scheduled Meds:apixaban, 5 mg, Oral, Q12H  aspirin, 81 mg, Oral, Once per day on Mon Wed Fri  atorvastatin, 40 mg, Oral,  Daily  budesonide, 0.5 mg, Nebulization, BID - RT  cefTRIAXone, 2 g, Intravenous, Q24H  docusate sodium, 200 mg, Oral, BID  famotidine, 20 mg, Oral, BID AC  furosemide, 40 mg, Oral, Daily  isosorbide mononitrate, 120 mg, Oral, Q24H  metOLazone, 5 mg, Oral, Once per day on Mon Wed Fri  metoprolol tartrate, 50 mg, Oral, BID With Meals  mirtazapine, 30 mg, Oral, Nightly  pregabalin, 150 mg, Oral, BID  ranolazine, 1,000 mg, Oral, Q12H  rOPINIRole, 1 mg, Oral, Nightly  sodium chloride, 3 mL, Intravenous, Q12H  traZODone, 100 mg, Oral, Nightly  vancomycin, 1,500 mg, Intravenous, Once      Continuous Infusions:Pharmacy to dose vancomycin,       PRN Meds:.benzonatate  •  HYDROmorphone  •  hydrOXYzine  •  ipratropium-albuterol  •  magnesium sulfate **OR** magnesium sulfate **OR** magnesium sulfate  •  nitroglycerin  •  ondansetron **OR** ondansetron  •  oxyCODONE  •  Pharmacy to dose vancomycin  •  potassium chloride **OR** potassium chloride **OR** potassium chloride  •  [COMPLETED] Insert peripheral IV **AND** sodium chloride  •  sodium chloride  •  Vancomycin Pharmacy Intermittent/Pulse Dosing    Labs:    CBC    Results from last 7 days   Lab Units 09/29/22  0141 09/28/22  0313 09/27/22  1932   WBC 10*3/mm3 8.30 8.50 8.70   HEMOGLOBIN g/dL 7.9* 7.9* 8.2*   PLATELETS 10*3/mm3 335 286 290     BMP   Results from last 7 days   Lab Units 09/29/22  0141 09/28/22  0313 09/27/22  1932   SODIUM mmol/L 135* 136 136   POTASSIUM mmol/L 3.5 3.2* 3.0*   CHLORIDE mmol/L 94* 96* 95*   CO2 mmol/L 28.0 27.0 29.0   BUN mg/dL 21 24* 22   CREATININE mg/dL 1.22 1.42* 1.44*   GLUCOSE mg/dL 173* 170* 146*   MAGNESIUM mg/dL 2.7* 1.5* 1.5*     Cr Clearance Estimated Creatinine Clearance: 70.4 mL/min (by C-G formula based on SCr of 1.22 mg/dL).  Coag     HbA1C   Lab Results   Component Value Date    HGBA1C 8.5 (H) 06/02/2022    HGBA1C 8.5 (H) 04/19/2022    HGBA1C 6.7 (H) 09/28/2021     Blood Glucose   Glucose   Date/Time Value Ref Range Status    09/27/2022 1945 119 (H) 70 - 105 mg/dL Final     Comment:     Serial Number: [id number]:  [id number]     Infection   Results from last 7 days   Lab Units 09/27/22 2045 09/27/22 1935   BLOODCX  No growth at 24 hours  No growth at 24 hours  --    WOUNDCX   --  Growth present, too young to evaluate     CMP   Results from last 7 days   Lab Units 09/29/22  0141 09/28/22  0313 09/27/22 1932   SODIUM mmol/L 135* 136 136   POTASSIUM mmol/L 3.5 3.2* 3.0*   CHLORIDE mmol/L 94* 96* 95*   CO2 mmol/L 28.0 27.0 29.0   BUN mg/dL 21 24* 22   CREATININE mg/dL 1.22 1.42* 1.44*   GLUCOSE mg/dL 173* 170* 146*   ALBUMIN g/dL  --   --  3.10*   BILIRUBIN mg/dL  --   --  1.0   ALK PHOS U/L  --   --  109   AST (SGOT) U/L  --   --  20   ALT (SGPT) U/L  --   --  7     UA    Results from last 7 days   Lab Units 09/27/22 2106   NITRITE UA  Negative   WBC UA /HPF None Seen   BACTERIA UA /HPF 1+*   SQUAM EPITHEL UA /HPF 0-2     Radiology(recent) XR ELBOW 2 VIEW LEFT    Result Date: 9/27/2022  1. Negative for fracture. 2. Soft tissue swelling overlying olecranon most suggestive of olecranon bursitis. 3. Chronic changes related to prior ORIF with distal ulnar screw fixation and chronic soft tissue calcifications overlying the posterior aspect of the elbow.  Electronically Signed By-Valente Hoover MD On:9/27/2022 8:20 PM This report was finalized on [id number] by  Valente Hoover MD.     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  Peptic ulcer disease  History of erosive gastritis  History of grade B esophagitis  Gastroesophageal reflux disease with esophagitis  Herbert's esophageal change  DM II with renal manifestations  DMII with angiopathic manifestations  DMII with neuropathic manifestations  Diabetic dyslipidemia  Panlobular COPD with emphysema  Nicotine dependency with nicotine use  disorder, cigarettes  DDD L/S  CKD II  HTN associated with CKD II  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  Cirrhosis  Small vessel PAD secondary to diabetes  Diabetic lower extremity ulcerations  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, MOSD  History of right renal mass  3.4 cm infrarenal abdominal aortic aneurysm  Valvular heart disease with significant mitral regurgitation  Paroxysmal atrial fibrillation  Hypercoagulable state secondary to atrial fibrillation  Chronic oral anticoagulation therapy with direct thrombin inhibition  ROS  ACD  Chronic systolic congestive heart failure  Chronic hypoxic respiratory failure  Supplemental oxygen dependency  Chronic mucopurulent bronchitis  History of prostate cancer  Morbid exogenous obesity  Hypoventilation apnea syndrome with ARTI  Peripheral polyneuropathy secondary to alcohol and diabetes  Autonomic neuropathy secondary to diabetes with autonomic dysfunction syndrome  Monoclonal paraproteinemia  Narcotic dependency  Vitamin D deficiency  ETOHISM  H/O pacemaker placement  Hypokalemia  Hypomagnesemia    Plan:  IV antimicrobial therapy//pending full surgical consultation        Yusuf Jones MD  09/29/22  08:44 EDT

## 2022-09-29 NOTE — PLAN OF CARE
Goal Outcome Evaluation:              Outcome Evaluation: Pt presents well rested. Pt was npo and advanced to clear liquids this afternoon. Denies pain or discomfort. Bharath continue to monitor.

## 2022-09-29 NOTE — DISCHARGE PLACEMENT REQUEST
"Ro Sidhu (77 y.o. Male)             Date of Birth   1945    Social Security Number       Address   [address] [place] [address] [place] [facility]    Home Phone   [phone]    MRN   [id number]       Episcopalian   Catholic    Marital Status                               Admission Date   9/27/22    Admission Type   Emergency    Admitting Provider   Yusuf Jones MD    Attending Provider   Yusuf Jones MD    Department, Room/Bed   [facility] 2C MEDICAL INPATIENT, 249/1       Discharge Date       Discharge Disposition       Discharge Destination                               Attending Provider: Yusuf Jones MD    Allergies: Haloperidol, Morphine, Pantoprazole, Latex    Isolation: None   Infection: MRSA No Isolation this Admit (04/19/22)   Code Status: CPR   Advance Care Planning Activity    Ht: 182.9 cm (72.01\")   Wt: 124 kg (274 lb 7.6 oz)    Admission Cmt: None   Principal Problem: None                Active Insurance as of 9/27/2022     Primary Coverage     Payor Plan Insurance Group Employer/Plan Group    HUMANA MEDICARE REPLACEMENT HUMANA MEDICARE REPLACEMENT [id number]     Payor Plan Address Payor Plan Phone Number Payor Plan Fax Number Effective Dates    [address] [address] [phone]  1/1/2018 - None Entered    formerly [facility] [id number]       Subscriber Name Subscriber Birth Date Member ID       RO SIDHU 1945 [id number]                 Emergency Contacts      (Rel.) Home Phone Work Phone Mobile Phone    LONI SIDHU (Spouse) [phone] -- [phone]    Valero,Tierra (Daughter) -- -- [phone]               History & Physical      Yusuf Jones MD at 09/28/22 0904        The patient has been evaluated by myself.  The patient does have a very complex medical history but presented with an acute left painful swollen left elbow with profound cellulitis, olecranon bursitis and possible septic arthropathy.  And microbial therapy has been initiated " and we will ask orthopedic surgery to participate for definitive intervention.  Pain control will be provided.    Electronically signed by Yusuf Jones MD at 09/28/22 0905     Uma Neal APRN at 09/28/22 0149              Patient Care Team:  Yusuf Jones MD as PCP - General  [facility] as Consulting Physician (Cardiology)  Moise Watson MD as Consulting Physician (Cardiac Electrophysiology)  Izzy Alvarez MD as Consulting Physician (Nephrology)  Celine Swanson MD as Consulting Physician (Cardiology)    Chief complaint Elbow pain    Subjective     Patient is a 77 y.o. male who presents with complaints of left elbow pain and swelling that he noticed this morning. Pain waxes and wanes, feels like throbbing.  Patient reports a fever yesterday of 102, but has otherwise felt ok. Patient states he fell on his elbow approximately 5-6 years ago, requiring surgical intervention and he believes that his hardware may be coming loose. Patient states he was recently discharged a few days ago from a bowel obstruction, with no surgical intervention. Patient also complains of a wound to his left anterior calf that he reports was there at the time of his recent discharge from the hospital. Patient denies nausea, vomiting, diarrhea, shortness of breath, or chest pain. Denies abdominal pain, weakness, any urinary symptoms.     Onset of symptoms was 1 day.      Review of Systems   Constitutional: Positive for fever. Negative for activity change and fatigue.   HENT: Negative.    Eyes: Negative.    Respiratory: Negative.    Cardiovascular: Negative.    Gastrointestinal: Positive for abdominal distention and nausea. Negative for abdominal pain, constipation and diarrhea.   Endocrine: Negative.    Genitourinary: Negative.    Musculoskeletal: Positive for joint swelling.   Skin: Positive for wound.   Neurological: Negative.    Psychiatric/Behavioral: Negative.           History  Past  Medical History:   Diagnosis Date   • 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) 10/26/2020   • Benign hypertension with CKD (chronic kidney disease) stage III (HCC) 10/26/2020   • Bruises easily    • CHF (congestive heart failure) (HCC)    • Chronic bronchitis with COPD (chronic obstructive pulmonary disease) (HCC) 10/26/2020   • Chronic respiratory failure with hypoxia (HCC) 10/26/2020   • Congenital heart defect    • Difficulty attaining erection    • Heart block    • Hernia of abdominal wall    • Hypertension    • Low back pain    • Pacemaker    • Peripheral vascular disease due to secondary diabetes (HCC) 10/26/2020   • Poor circulation    • Prostate cancer (HCC)     prostate   • Shortness of breath    • Sleep apnea     using CPAP    • Stage 3a chronic kidney disease (HCC) 10/26/2020   • Stented coronary artery 12/05/2019    MICHAEL to LAD   • Type 2 diabetes, controlled, with neuropathy (HCC) 10/26/2020    no meds   • Type 2 diabetes, controlled, with neuropathy ([facility]) 10/26/2020     Past Surgical History:   Procedure Laterality Date   • ABDOMINAL SURGERY     • APPENDECTOMY     • BONE CURETTAGE Right 1/22/2021    Procedure: Wound excision with fifth metatarsal head resection, right foot.;  Surgeon: Josef Egan DPM;  Location: [facility] MAIN OR;  Service: Podiatry;  Laterality: Right;   • BONE INCISION AND DRAINAGE Right 10/5/2020    Procedure: Incision and drainage with debridement of all nonviable soft tissue and bone with bone biopsy, right foot.;  Surgeon: Josef Egan DPM;  Location: [facility] MAIN OR;  Service: Podiatry;  Laterality: Right;   • BRONCHOSCOPY N/A 4/23/2022    Procedure: BRONCHOSCOPY with bronchioalveolar lavage of right lower lobe;  Surgeon: Edwin Kline MD;  Location: [facility] ENDOSCOPY;  Service: Pulmonary;  Laterality: N/A;  pneumonia   • CARDIAC CATHETERIZATION N/A 12/5/2019    Procedure: Left Heart Cath;  Surgeon: Mirza Munson MD;  Location: [facility] CATH INVASIVE  LOCATION;  Service: Cardiology   • CARDIAC CATHETERIZATION N/A 12/5/2019    Procedure: Stent MICHAEL coronary;  Surgeon: Mirza Munson MD;  Location: [facility] CATH INVASIVE LOCATION;  Service: Cardiology   • CARDIAC CATHETERIZATION N/A 10/18/2021    Procedure: Left Heart Cath and coronary angiogram;  Surgeon: Celine Swanson MD;  Location: [facility] CATH INVASIVE LOCATION;  Service: Cardiovascular;  Laterality: N/A;   • CARDIAC CATHETERIZATION N/A 10/18/2021    Procedure: Right Heart Cath;  Surgeon: Celine Swanson MD;  Location: [facility] CATH INVASIVE LOCATION;  Service: Cardiovascular;  Laterality: N/A;   • CHOLECYSTECTOMY     • ELBOW PROCEDURE      left   • ENDOSCOPY N/A 7/6/2022    Procedure: ESOPHAGOGASTRODUODENOSCOPY with biopsy x 1 area. bicap cautery, scelero therapy.;  Surgeon: Douglas Delgadillo MD;  Location: [facility] ENDOSCOPY;  Service: Gastroenterology;  Laterality: N/A;  ESOPHAGITIS, ANTREL ULCER, DUODENAL ULCER   • FOOT SURGERY      right foot   • HERNIA REPAIR     • INCISION AND DRAINAGE OF WOUND Right 4/6/2021    Procedure: INCISION AND DRAINAGE OF RIGHT FOOT 5TH METATARSAL TO BONE AND PARTIAL 5TH METATARSAL RESECTION;  Surgeon: Josef Egan DPM;  Location: [facility] MAIN OR;  Service: Podiatry;  Laterality: Right;   • INCISION AND DRAINAGE OF WOUND Right 4/8/2021    Procedure: INCISION AND DRAINAGE BONE, DELAYED PRIMARY CLOSURE;  Surgeon: Josef Egan DPM;  Location: [facility] MAIN OR;  Service: Podiatry;  Laterality: Right;   • INTERVENTIONAL RADIOLOGY PROCEDURE N/A 12/5/2019    Procedure: Intravascular Ultrasound;  Surgeon: Mirza Munson MD;  Location: [facility] CATH INVASIVE LOCATION;  Service: Cardiology   • PACEMAKER IMPLANTATION     • CO RT/LT HEART CATHETERS N/A 12/5/2019    Procedure: Percutaneous Coronary Intervention;  Surgeon: Mirza Munson MD;  Location: [facility] CATH INVASIVE LOCATION;  Service: Cardiology   • WOUND DEBRIDEMENT Right 10/29/2020     Procedure: Preparation and debridement of foot wound with application of Integra wound graft, right foot.;  Surgeon: Josef Egan DPM;  Location: [facility] MAIN OR;  Service: Podiatry;  Laterality: Right;   • WOUND DEBRIDEMENT N/A 2021    Procedure: EXCISIONAL ABDOMINAL WOUND  DEBRIDEMENT;  Surgeon: Cleopatra Wang MD;  Location: [facility] MAIN OR;  Service: General;  Laterality: N/A;   • WOUND DEBRIDEMENT N/A 2021    Procedure: DEBRIDEMENT OF ABDOMINAL WALL;  Surgeon: Hill Bhatia DO;  Location: [facility] MAIN OR;  Service: General;  Laterality: N/A;     Family History   Problem Relation Age of Onset   • Alzheimer's disease Mother    • GI problems Father    • Heart disease Father      Social History     Tobacco Use   • Smoking status: Former Smoker     Years: 15.00     Types: Cigarettes     Start date: 1966     Quit date: 5/10/2020     Years since quittin.3   • Smokeless tobacco: Never Used   Vaping Use   • Vaping Use: Never used   Substance Use Topics   • Alcohol use: Yes     Alcohol/week: 4.0 standard drinks     Types: 4 Shots of liquor per week     Comment: occasionally   • Drug use: No     Medications Prior to Admission   Medication Sig Dispense Refill Last Dose   • apixaban (ELIQUIS) 5 MG tablet tablet Take 5 mg by mouth 2 (Two) Times a Day.   2022 at Unknown time   • aspirin 81 MG EC tablet Take 1 tablet by mouth 3 (Three) Times a Week. 30 tablet 5 2022 at Unknown time   • atorvastatin (LIPITOR) 40 MG tablet Take 40 mg by mouth Every Morning.   2022 at Unknown time   • famotidine (PEPCID) 20 MG tablet Take 1 tablet by mouth 2 (Two) Times a Day Before Meals. 60 tablet 0 2022 at Unknown time   • furosemide (LASIX) 40 MG tablet Take 40 mg by mouth Every Morning.   2022 at Unknown time   • isosorbide mononitrate (IMDUR) 120 MG 24 hr tablet Take 1 tablet by mouth Daily. 30 tablet 3 2022 at Unknown time   • metoprolol tartrate (LOPRESSOR) 50 MG tablet Take 50 mg by mouth  2 (Two) Times a Day With Meals.   9/27/2022 at Unknown time   • mirtazapine (REMERON) 30 MG tablet Take 30 mg by mouth Every Night.   9/27/2022 at Unknown time   • oxyCODONE-acetaminophen (Percocet)  MG per tablet Take 1 tablet by mouth Every 6 (Six) Hours As Needed for Moderate Pain . 120 tablet 0 9/27/2022 at Unknown time   • pregabalin (LYRICA) 150 MG capsule Take 150 mg by mouth 2 (Two) Times a Day.   9/27/2022 at Unknown time   • ranolazine (RANEXA) 1000 MG 12 hr tablet Take 1,000 mg by mouth Every 12 (Twelve) Hours.   9/27/2022 at Unknown time   • rOPINIRole (REQUIP) 1 MG tablet Take 1 mg by mouth Daily.   9/27/2022 at Unknown time   • traZODone (DESYREL) 100 MG tablet Take 100 mg by mouth Every Night. After meals   9/27/2022 at Unknown time   • vitamin D (ERGOCALCIFEROL) 1.25 MG (94195 UT) capsule capsule Take 50,000 Units by mouth 1 (One) Time Per Week. Friday 9/27/2022 at Unknown time   • budesonide (PULMICORT) 0.5 MG/2ML nebulizer solution Take 2 mL by nebulization 2 (Two) Times a Day. 120 mL 6    • docusate sodium (COLACE) 100 MG capsule Take 200 mg by mouth 2 (Two) Times a Day.      • hydrOXYzine (ATARAX) 25 MG tablet Take 25 mg by mouth At Night As Needed.   Unknown at Unknown time   • ipratropium-albuterol (DUO-NEB) 0.5-2.5 mg/3 ml nebulizer Take 3 mL by nebulization 3 (Three) Times a Day. (Patient not taking: No sig reported) 360 mL 3 Unknown at Unknown time   • ipratropium-albuterol (DUO-NEB) 0.5-2.5 mg/3 ml nebulizer Take 3 mL by nebulization 4 (Four) Times a Day As Needed.   Unknown at Unknown time   • metOLazone (ZAROXOLYN) 5 MG tablet Take 5 mg by mouth 3 (Three) Times a Week. Monday, Wednesday, Friday    As needed for weight gain.      • nitroglycerin (NITROSTAT) 0.4 MG SL tablet Place 0.4 mg under the tongue Every 5 (Five) Minutes As Needed for Chest Pain.      • VENTOLIN  (90 Base) MCG/ACT inhaler Inhale 2 puffs Every 4 (Four) Hours As Needed for Wheezing or Shortness of Air.  May use every 4 to 6 hours   Unknown at Unknown time     Allergies:  Haloperidol, Morphine, Pantoprazole, and Latex    Objective     Vital Signs  Temp:  [97.7 °F (36.5 °C)-97.8 °F (36.6 °C)] 97.8 °F (36.6 °C)  Heart Rate:  [69-98] 70  Resp:  [18] 18  BP: (102-133)/(48-77) 123/70     Physical Exam:      General Appearance:    Alert, cooperative, in no acute distress   Head:    Normocephalic, without obvious abnormality, atraumatic   Eyes:            Lids and lashes normal, conjunctivae and sclerae normal, no   icterus, no pallor, corneas clear, PERRLA   Ears:    Ears appear intact with no abnormalities noted   Throat:   No oral lesions, no thrush, oral mucosa moist   Neck:   No adenopathy, supple, trachea midline, no thyromegaly, no   carotid bruit, no JVD   Lungs:     Clear to auscultation,respirations regular, even and                  unlabored    Heart:    Regular rhythm and normal rate, normal S1 and S2, no            murmur, no gallop, no rub, no click   Chest Wall:    No abnormalities observed   Abdomen:     Normal bowel sounds, no masses, no organomegaly, soft        non-tender, non-distended, no guarding, no rebound                tenderness   Extremities:   Left elbow- decreased ROM, swelling and tenderness, +1 edema in bilateral lower extremities   Pulses:   Pulses palpable and equal bilaterally   Skin:   No bleeding, bruising or rash   Lymph nodes:   No palpable adenopathy   Neurologic:   Cranial nerves 2 - 12 grossly intact, sensation intact, DTR       present and equal bilaterally       Results Review:     Imaging Results (Last 24 Hours)     Procedure Component Value Units Date/Time    XR ELBOW 2 VIEW LEFT [[id number]] Collected: 09/27/22 2018     Updated: 09/27/22 2022    Narrative:      DATE OF EXAM:  9/27/2022 7:56 PM     PROCEDURE:  XR ELBOW 2 VW LEFT-     INDICATIONS:  elbow pain; M25.522-Pain in left elbow; M70.22-Olecranon bursitis, left  elbow; T14.8XXA-Other injury of unspecified body region,  initial  encounter; L08.9-Local infection of the skin and subcutaneous tissue,  unspecified     COMPARISON:  05/20/2016     TECHNIQUE:   Two Radiologic views of the left elbow were obtained.     FINDINGS:  There is soft tissue swelling overlying the olecranon. There is a single  fixation screw with cerclage wires again noted at the proximal ulna  extending to the olecranon unchanged in position from prior study. There  are soft tissue calcifications overlying the posterior soft tissues at  level of the distal triceps similar to prior exam. There is no acute  fracture. No definite joint effusion.        Impression:      1. Negative for fracture.  2. Soft tissue swelling overlying olecranon most suggestive of olecranon  bursitis.  3. Chronic changes related to prior ORIF with distal ulnar screw  fixation and chronic soft tissue calcifications overlying the posterior  aspect of the elbow.     Electronically Signed By-Valente Hoover MD On:9/27/2022 8:20 PM  This report was finalized on [id number] by  Valente Hoover MD.           Lab Results (last 24 hours)     Procedure Component Value Units Date/Time    Magnesium [[id number]]  (Abnormal) Collected: 09/27/22 1932    Specimen: Blood from Arm, Right Updated: 09/27/22 2143     Magnesium 1.5 mg/dL     Urinalysis, Microscopic Only - Urine, Clean Catch [[id number]]  (Abnormal) Collected: 09/27/22 2106    Specimen: Urine, Clean Catch Updated: 09/27/22 2132     RBC, UA None Seen /HPF      WBC, UA None Seen /HPF      Bacteria, UA 1+ /HPF      Squamous Epithelial Cells, UA 0-2 /HPF      Hyaline Casts, UA 0-2 /LPF      Mucus, UA Small/1+ /HPF      Methodology Manual Light Microscopy    Urinalysis With Microscopic If Indicated (No Culture) - Urine, Clean Catch [[id number]]  (Abnormal) Collected: 09/27/22 2106    Specimen: Urine, Clean Catch Updated: 09/27/22 2120     Color, UA Yellow     Appearance, UA Hazy     pH, UA <=5.0     Specific Gravity, UA 1.014     Glucose, UA Negative      Ketones, UA Negative     Bilirubin, UA Negative     Blood, UA Negative     Protein, UA 30 mg/dL (1+)     Leuk Esterase, UA Negative     Nitrite, UA Negative     Urobilinogen, UA 1.0 E.U./dL    Blood Culture - Blood, Wrist, Right [[id number]] Collected: 09/27/22 2045    Specimen: Blood from Wrist, Right Updated: 09/27/22 2050    Blood Culture - Blood, Arm, Right [[id number]] Collected: 09/27/22 2045    Specimen: Blood from Arm, Right Updated: 09/27/22 2050    Wound Culture - Drainage, Leg, Left [[id number]] Collected: 09/27/22 1935    Specimen: Drainage from Leg, Left Updated: 09/27/22 2037     Gram Stain Rare (1+) WBCs seen      Rare (1+) Gram positive cocci    Comprehensive Metabolic Panel [[id number]]  (Abnormal) Collected: 09/27/22 1932    Specimen: Blood from Arm, Right Updated: 09/27/22 2025     Glucose 146 mg/dL      BUN 22 mg/dL      Creatinine 1.44 mg/dL      Sodium 136 mmol/L      Potassium 3.0 mmol/L      Chloride 95 mmol/L      CO2 29.0 mmol/L      Calcium 7.7 mg/dL      Total Protein 6.6 g/dL      Albumin 3.10 g/dL      ALT (SGPT) 7 U/L      AST (SGOT) 20 U/L      Alkaline Phosphatase 109 U/L      Total Bilirubin 1.0 mg/dL      Globulin 3.5 gm/dL      A/G Ratio 0.9 g/dL      BUN/Creatinine Ratio 15.3     Anion Gap 12.0 mmol/L      eGFR 50.4 mL/min/1.73      Comment: National Kidney Foundation and American Society of Nephrology (ASN) Task Force recommended calculation based on the Chronic Kidney Disease Epidemiology Collaboration (CKD-EPI) equation refit without adjustment for race.       Narrative:      GFR Normal >60  Chronic Kidney Disease <60  Kidney Failure <15      C-reactive Protein [[id number]]  (Abnormal) Collected: 09/27/22 1932    Specimen: Blood from Arm, Right Updated: 09/27/22 2006     C-Reactive Protein 19.51 mg/dL     POC Lactate [[id number]]  (Normal) Collected: 09/27/22 2002    Specimen: Blood Updated: 09/27/22 2003     Lactate 1.3 mmol/L      Comment: Serial Number: [id number]:   [id number]       Sedimentation Rate [[id number]]  (Abnormal) Collected: 09/27/22 1932    Specimen: Blood from Arm, Right Updated: 09/27/22 1950     Sed Rate 27 mm/hr     POC Glucose Once [[id number]]  (Abnormal) Collected: 09/27/22 1945    Specimen: Blood Updated: 09/1945     Glucose 119 mg/dL      Comment: Serial Number: [id number]:  [id number]       CBC & Differential [[id number]]  (Abnormal) Collected: 09/27/22 1932    Specimen: Blood from Arm, Right Updated: 09/27/22 1945    Narrative:      The following orders were created for panel order CBC & Differential.  Procedure                               Abnormality         Status                     ---------                               -----------         ------                     CBC Auto Differential[[id number]]        Abnormal            Final result               Scan Slide[[id number]]                                                                    Please view results for these tests on the individual orders.    CBC Auto Differential [[id number]]  (Abnormal) Collected: 09/27/22 1932    Specimen: Blood from Arm, Right Updated: 09/27/22 1945     WBC 8.70 10*3/mm3      RBC 3.69 10*6/mm3      Hemoglobin 8.2 g/dL      Hematocrit 26.8 %      MCV 72.7 fL      MCH 22.1 pg      MCHC 30.4 g/dL      RDW 24.1 %      RDW-SD 61.3 fl      MPV 7.9 fL      Platelets 290 10*3/mm3      Neutrophil % 73.7 %      Lymphocyte % 9.5 %      Monocyte % 15.0 %      Eosinophil % 1.0 %      Basophil % 0.8 %      Neutrophils, Absolute 6.40 10*3/mm3      Lymphocytes, Absolute 0.80 10*3/mm3      Monocytes, Absolute 1.30 10*3/mm3      Eosinophils, Absolute 0.10 10*3/mm3      Basophils, Absolute 0.10 10*3/mm3      nRBC 0.1 /100 WBC            I reviewed the patient's new clinical results.    Assessment & Plan     Left elbow pain- Xray shows soft tissue swelling overlying olecranon suggestive of olecranon bursitis, chronic changes related to prior ORIF  Olecranon bursitis-given Rocephin  and Vanc in ED, continue vanc  Wound infection- wound care nurse consulted for dressing management  Hypokalemia- replacement protocol  Hypomagnesemia- replacement protocol      Continue home meds, monitor for pain management, see wound care for wound on left calf, blood cultures pending.    DVT prophylaxis- SCD's  GI prophylaxis- pepcid    I discussed the patient's findings and my recommendations with patient.     KOURTNEY Ashley  09/28/22  01:49 EDT        Electronically signed by Uma Neal APRN at 09/28/22 9961

## 2022-09-29 NOTE — PROGRESS NOTES
"Pharmacy Antimicrobial Dosing Service    Subjective:  Ro Nathan is a 77 y.o.male admitted with possible bone/joint and SSTI. Pharmacy has been consulted to dose Vancomycin.    PMH: Left elbow swelling and pain with prior history of surgery on this elbow from a fall approximately 5-6 years ago.       Assessment/Plan    1. Day #2 Vancomycin: Goal -600 mcg*h/mL. Random level this AM approximately 18 hours after last dose was 16.3 and patient was dosed with 1500 mg this AM. SCr has normalized, tomorrow will initiate vancomycin 1750 mg (18 mg/kg AdjBW) IV q24h. Will obtain levels prior to third dose on this regimen.    2. Day #2 Ceftriaxone: 2000 mg IV q24h    Will continue to monitor drug levels, renal function, culture and sensitivities, and patient clinical status.       Objective:  Relevant clinical data and objective history reviewed:  182.9 cm (72.01\")   124 kg (274 lb 7.6 oz)   Ideal body weight: 77.6 kg (171 lb 1.9 oz)  Adjusted ideal body weight: 96.4 kg (212 lb 7.4 oz)  Body mass index is 37.22 kg/m².    Results from last 7 days   Lab Units 09/29/22  0141 09/28/22  1052   VANCOMYCIN RM mcg/mL 16.30 15.50     Results from last 7 days   Lab Units 09/29/22  0141 09/28/22  0313 09/27/22  1932   CREATININE mg/dL 1.22 1.42* 1.44*     Estimated Creatinine Clearance: 70.4 mL/min (by C-G formula based on SCr of 1.22 mg/dL).  I/O last 3 completed shifts:  In: 1000 [P.O.:1000]  Out: 3000 [Urine:3000]    Results from last 7 days   Lab Units 09/29/22  0141 09/28/22  0313 09/27/22  1932   WBC 10*3/mm3 8.30 8.50 8.70     Temperature    09/28/22 2340 09/29/22 0331 09/29/22 0911   Temp: 98.3 °F (36.8 °C) 97.9 °F (36.6 °C) 97.6 °F (36.4 °C)     Baseline culture/source/susceptibility:  Microbiology Results (last 10 days)       Procedure Component Value - Date/Time    Blood Culture - Blood, Wrist, Right [[id number]]  (Normal) Collected: 09/28/22 1052    Lab Status: Preliminary result Specimen: Blood from Wrist, Right " Updated: 09/29/22 1117     Blood Culture No growth at 24 hours    Blood Culture - Blood, Wrist, Right [[id number]]  (Normal) Collected: 09/27/22 2045    Lab Status: Preliminary result Specimen: Blood from Wrist, Right Updated: 09/28/22 2102     Blood Culture No growth at 24 hours    Blood Culture - Blood, Arm, Right [[id number]]  (Normal) Collected: 09/27/22 2045    Lab Status: Preliminary result Specimen: Blood from Arm, Right Updated: 09/28/22 2102     Blood Culture No growth at 24 hours    Wound Culture - Drainage, Leg, Left [[id number]] Collected: 09/27/22 1935    Lab Status: Preliminary result Specimen: Drainage from Leg, Left Updated: 09/28/22 0942     Wound Culture Growth present, too young to evaluate     Gram Stain Rare (1+) WBCs seen      Rare (1+) Gram positive cocci            Anti-Infectives (From admission, onward)      Ordered     Dose/Rate Route Frequency Start Stop    09/29/22 0723  vancomycin 1500 mg/500 mL 0.9% NS IVPB ([facility])        Ordering Provider: Yusuf Jones MD    1,500 mg Intravenous Once 09/29/22 0815 09/29/22 0914    09/28/22 1222  cefTRIAXone (ROCEPHIN) 2 g in sodium chloride 0.9 % 100 mL IVPB        Ordering Provider: Julissa Gunn APRN    2 g  200 mL/hr over 30 Minutes Intravenous Every 24 Hours 09/28/22 1700 10/04/22 1659    09/28/22 1208  vancomycin 1500 mg/500 mL 0.9% NS IVPB ([facility])        Ordering Provider: Jeaneth Patel APRN    1,500 mg Intravenous Once 09/28/22 1500 09/28/22 1427    09/28/22 1212  Vancomycin Pharmacy Intermittent/Pulse Dosing        Ordering Provider: Julissa Gunn APRN     Does not apply As Needed 09/28/22 1211 10/05/22 1210    09/27/22 2222  vancomycin 2000 mg/500 mL 0.9% NS IVPB ([facility])        Ordering Provider: Jeaneth Patel APRN    2,000 mg Intravenous Once 09/27/22 2300 09/28/22 0315    09/27/22 2214  cefTRIAXone (ROCEPHIN) 1 g in sodium chloride 0.9 % 100 mL IVPB        Ordering Provider: Jeaneth Patel APRN    1 g  200 mL/hr over 30  Minutes Intravenous Once 09/27/22 2216 09/27/22 2258 09/27/22 2214  Pharmacy to dose vancomycin        Ordering Provider: Julissa Gunn APRN     Does not apply Continuous PRN 09/27/22 2212 10/04/22 2211            Bernadette Cuevas PharmD  09/29/22 11:32 EDT

## 2022-09-30 NOTE — CASE MANAGEMENT/SOCIAL WORK
Continued Stay Note  KATLYN Devlin     Patient Name: Ro Nathan  MRN: [id number]  Today's Date: 9/30/2022    Admit Date: 9/27/2022    Plan: Home with spouse and [facility] HH (accepted, will need order)   Discharge Plan     Row Name 09/30/22 1605       Plan    Plan Home with spouse and [facility] HH (accepted, will need order)    Plan Comments Barriers to discharge: IV vanc. L elbow wound. Ortho consult, ID consult              Expected Discharge Date and Time     Expected Discharge Date Expected Discharge Time    Sep 30, 2022           Phone communication or documentation only - no physical contact with patient or family.  Yulia Barnett RN      Office Phone [phone]  Office Cell [phone]

## 2022-09-30 NOTE — PLAN OF CARE
Goal Outcome Evaluation:  Plan of Care Reviewed With: patient        Progress: improving  Outcome Evaluation: Pt rested well through the shift, received prn for pain per request. Signed off by Infectious disease, possible discharge tomorrow with oral antibiotics. Will continue to monitor.

## 2022-09-30 NOTE — PLAN OF CARE
Goal Outcome Evaluation:  Plan of Care Reviewed With: patient        Progress: no change   Pt c/o severe left elbow pain and frequently requesting prn pain medication. Currently on 2L O2. Will continue to monitor...

## 2022-09-30 NOTE — PROGRESS NOTES
LOS: 3 days   Patient Care Team:  Yusuf Jones MD as PCP - General  PhiJoshua MD as Consulting Physician (Cardiology)  Shirley, Moise Kathleen MD as Consulting Physician (Cardiac Electrophysiology)  Izzy Alvarez MD as Consulting Physician (Nephrology)  Celine Swanson MD as Consulting Physician (Cardiology)    Subjective:  Pain    Objective:   Improvement in the erythema and edema      Review of Systems:   Review of Systems   Respiratory: Positive for shortness of breath.    Gastrointestinal: Positive for abdominal distention.   Musculoskeletal: Positive for arthralgias and joint swelling.           Vital Signs  Temp:  [97.6 °F (36.4 °C)-98.4 °F (36.9 °C)] 98 °F (36.7 °C)  Heart Rate:  [69-79] 78  Resp:  [16-20] 18  BP: (105-136)/(63-79) 113/63    Physical Exam:  Physical Exam  Vitals and nursing note reviewed.   Constitutional:       General: He is not in acute distress.     Appearance: Normal appearance.   Cardiovascular:      Rate and Rhythm: Normal rate.      Heart sounds: Normal heart sounds.   Pulmonary:      Breath sounds: Normal breath sounds.   Abdominal:      General: There is distension.      Palpations: Abdomen is soft.      Tenderness: There is no abdominal tenderness.   Musculoskeletal:         General: Swelling and tenderness present.   Skin:     General: Skin is warm.   Neurological:      Mental Status: He is alert.          Radiology:  XR ELBOW 2 VIEW LEFT    Result Date: 9/27/2022  1. Negative for fracture. 2. Soft tissue swelling overlying olecranon most suggestive of olecranon bursitis. 3. Chronic changes related to prior ORIF with distal ulnar screw fixation and chronic soft tissue calcifications overlying the posterior aspect of the elbow.  Electronically Signed By-Valente Hoover MD On:9/27/2022 8:20 PM This report was finalized on [id number] by  Valente Hoover MD.    CT Upper Extremity Left Without Contrast    Result Date: 9/29/2022  1.Limited evaluation  due to lack of intravenous contrast and patient's inability to position elbow for standard imaging. 2.Nonspecific edema in the posterior soft tissues with superficial collection at the posterior medial elbow measuring approximately 3.8 x 3.6 x 2.1 cm. This is in the area of the olecranon bursa. Infectious bursitis cannot be confirmed or excluded. If there is suspicion for infectious bursitis, aspiration may be necessary for diagnosis. 3.Nonspecific elbow effusion. If there is suspicion for joint infection, aspiration would be advised. 4.Status post internal fixation of the olecranon without definite hardware complication seen on this exam. 5.Ossifications at the posterior elbow could be related to heterotopic ossification or chronic displaced fracture fragments. Electronically Signed: Joshua Boucher MD 9/29/2022 16:41 EDT         Results Review:     I reviewed the patient's new clinical results.  I reviewed the patient's new imaging results and agree with the interpretation.    Medication Review:   Scheduled Meds:apixaban, 5 mg, Oral, Q12H  aspirin, 81 mg, Oral, Once per day on Mon Wed Fri  atorvastatin, 40 mg, Oral, Daily  budesonide, 0.5 mg, Nebulization, BID - RT  cefTRIAXone, 2 g, Intravenous, Q24H  docusate sodium, 200 mg, Oral, BID  famotidine, 20 mg, Oral, BID AC  furosemide, 40 mg, Oral, Daily  isosorbide mononitrate, 120 mg, Oral, Q24H  metOLazone, 5 mg, Oral, Once per day on Mon Wed Fri  metoprolol tartrate, 50 mg, Oral, BID With Meals  mirtazapine, 30 mg, Oral, Nightly  nystatin, , Topical, Q12H  pregabalin, 150 mg, Oral, BID  ranolazine, 1,000 mg, Oral, Q12H  rOPINIRole, 1 mg, Oral, Nightly  sodium chloride, 3 mL, Intravenous, Q12H  traZODone, 100 mg, Oral, Nightly  vancomycin, 1,750 mg, Intravenous, Q24H      Continuous Infusions:Pharmacy to dose vancomycin,       PRN Meds:.benzonatate  •  HYDROmorphone  •  hydrOXYzine  •  ipratropium-albuterol  •  magnesium sulfate **OR** magnesium sulfate **OR**  magnesium sulfate  •  nitroglycerin  •  ondansetron **OR** ondansetron  •  oxyCODONE  •  Pharmacy to dose vancomycin  •  potassium chloride **OR** potassium chloride **OR** potassium chloride  •  [COMPLETED] Insert peripheral IV **AND** sodium chloride  •  sodium chloride    Labs:    CBC    Results from last 7 days   Lab Units 09/30/22  0425 09/29/22  0141 09/28/22 0313 09/27/22 1932   WBC 10*3/mm3 7.80 8.30 8.50 8.70   HEMOGLOBIN g/dL 8.2* 7.9* 7.9* 8.2*   PLATELETS 10*3/mm3 400 335 286 290     BMP   Results from last 7 days   Lab Units 09/30/22 0425 09/29/22 0141 09/28/22 0313 09/27/22 1932   SODIUM mmol/L 136 135* 136 136   POTASSIUM mmol/L 3.9 3.5 3.2* 3.0*   CHLORIDE mmol/L 97* 94* 96* 95*   CO2 mmol/L 27.0 28.0 27.0 29.0   BUN mg/dL 17 21 24* 22   CREATININE mg/dL 1.06 1.22 1.42* 1.44*   GLUCOSE mg/dL 215* 173* 170* 146*   MAGNESIUM mg/dL 2.5* 2.7* 1.5* 1.5*     Cr Clearance Estimated Creatinine Clearance: 81 mL/min (by C-G formula based on SCr of 1.06 mg/dL).  Coag     HbA1C   Lab Results   Component Value Date    HGBA1C 8.5 (H) 06/02/2022    HGBA1C 8.5 (H) 04/19/2022    HGBA1C 6.7 (H) 09/28/2021     Blood Glucose   Glucose   Date/Time Value Ref Range Status   09/27/2022 1945 119 (H) 70 - 105 mg/dL Final     Comment:     Serial Number: [id number]:  [id number]     Infection   Results from last 7 days   Lab Units 09/28/22  1326 09/28/22  1052 09/27/22 2045 09/27/22 1935   BLOODCX  No growth at 24 hours No growth at 24 hours No growth at 2 days  No growth at 2 days  --    WOUNDCX   --   --   --  Heavy growth (4+) Corynebacterium species*     CMP   Results from last 7 days   Lab Units 09/30/22  0425 09/29/22  0141 09/28/22  0313 09/27/22  1932   SODIUM mmol/L 136 135* 136 136   POTASSIUM mmol/L 3.9 3.5 3.2* 3.0*   CHLORIDE mmol/L 97* 94* 96* 95*   CO2 mmol/L 27.0 28.0 27.0 29.0   BUN mg/dL 17 21 24* 22   CREATININE mg/dL 1.06 1.22 1.42* 1.44*   GLUCOSE mg/dL 215* 173* 170* 146*   ALBUMIN g/dL  --    --   --  3.10*   BILIRUBIN mg/dL  --   --   --  1.0   ALK PHOS U/L  --   --   --  109   AST (SGOT) U/L  --   --   --  20   ALT (SGPT) U/L  --   --   --  7     UA    Results from last 7 days   Lab Units 09/27/22 2106   NITRITE UA  Negative   WBC UA /HPF None Seen   BACTERIA UA /HPF 1+*   SQUAM EPITHEL UA /HPF 0-2     Radiology(recent) CT Upper Extremity Left Without Contrast    Result Date: 9/29/2022  1.Limited evaluation due to lack of intravenous contrast and patient's inability to position elbow for standard imaging. 2.Nonspecific edema in the posterior soft tissues with superficial collection at the posterior medial elbow measuring approximately 3.8 x 3.6 x 2.1 cm. This is in the area of the olecranon bursa. Infectious bursitis cannot be confirmed or excluded. If there is suspicion for infectious bursitis, aspiration may be necessary for diagnosis. 3.Nonspecific elbow effusion. If there is suspicion for joint infection, aspiration would be advised. 4.Status post internal fixation of the olecranon without definite hardware complication seen on this exam. 5.Ossifications at the posterior elbow could be related to heterotopic ossification or chronic displaced fracture fragments. 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  Peptic ulcer disease  History of erosive gastritis  History of grade B esophagitis  Gastroesophageal reflux disease with esophagitis  Herbert's esophageal change  DM II with renal manifestations  DMII with angiopathic manifestations  DMII with neuropathic manifestations  Diabetic dyslipidemia  Panlobular COPD with emphysema  Nicotine dependency with nicotine use disorder, cigarettes  DDD L/S  CKD II  HTN associated with CKD II  ASHD of native coronary arteries with angina  pectoris  Cirrhosis  Small vessel PAD secondary to diabetes  Diabetic lower extremity ulcerations  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, MOSD  History of right renal mass  3.4 cm infrarenal abdominal aortic aneurysm  Valvular heart disease with significant mitral regurgitation  Paroxysmal atrial fibrillation  Hypercoagulable state secondary to atrial fibrillation  Chronic oral anticoagulation therapy with direct thrombin inhibition  ROS  ACD  Chronic systolic congestive heart failure  Chronic hypoxic respiratory failure  Supplemental oxygen dependency  Chronic mucopurulent bronchitis  History of prostate cancer  Morbid exogenous obesity  Hypoventilation apnea syndrome with ARTI  Peripheral polyneuropathy secondary to alcohol and diabetes  Autonomic neuropathy secondary to diabetes with autonomic dysfunction syndrome  Monoclonal paraproteinemia  Narcotic dependency  Vitamin D deficiency  ETOHISM  H/O pacemaker placement  Hypokalemia  Hypomagnesemia       Plan:  Continue IV antimicrobial therapy//the patient was encouraged to begin range of motion activities of the left arm        Yusuf Jones MD  09/30/22  08:09 EDT

## 2022-10-01 NOTE — PLAN OF CARE
Goal Outcome Evaluation:  Plan of Care Reviewed With: patient        Progress: improving  Outcome Evaluation: Pt is being discharged back home per order.

## 2022-10-01 NOTE — DISCHARGE SUMMARY
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  Peptic ulcer disease  History of erosive gastritis  History of grade B esophagitis  Gastroesophageal reflux disease with esophagitis  Herbert's esophageal change  DM II with renal manifestations  DMII with angiopathic manifestations  DMII with neuropathic manifestations  Diabetic dyslipidemia  Panlobular COPD with emphysema  Nicotine dependency with nicotine use disorder, cigarettes  DDD L/S  CKD II  HTN associated with CKD II  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  Cirrhosis  Small vessel PAD secondary to diabetes  Diabetic lower extremity ulcerations  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, MOSD  History of right renal mass  3.4 cm infrarenal abdominal aortic aneurysm  Valvular heart disease with significant mitral regurgitation  Paroxysmal atrial fibrillation  Hypercoagulable state secondary to atrial fibrillation  Chronic oral anticoagulation therapy with direct thrombin inhibition  ROS  ACD  Chronic systolic congestive heart failure  Chronic hypoxic respiratory failure  Supplemental oxygen dependency  Chronic mucopurulent bronchitis  History of prostate cancer  Morbid exogenous obesity  Hypoventilation apnea syndrome with ARTI  Peripheral polyneuropathy secondary to alcohol and diabetes  Autonomic neuropathy secondary to diabetes with autonomic dysfunction syndrome  Monoclonal paraproteinemia  Narcotic dependency  Vitamin D deficiency  ETOHISM  H/O pacemaker placement  Hypokalemia  Hypomagnesemia      Presenting Problem/History of Present Illness  Active Hospital Problems    Diagnosis  POA   • **Olecranon bursitis of left elbow [M70.22]  Yes   • Wound infection [T14.8XXA, L08.9]  Yes   • Elbow pain, left [M25.522]  Yes      Resolved Hospital Problems   No resolved problems to  display.          Hospital Course  Patient is a 77 y.o. male who presented with an acute left olecranon bursitis with profound swelling of the left elbow associated with some erythema and some fluctuance.  The patient was admitted and parenteral pain control was initiated and an evaluation by infectious disease and orthopedic surgery was conducted.  Parenteral antimicrobial therapy was initiated and he began to respond.  Imaging failed to reveal evidence of involvement of the joint itself and there was no fluid collection that was amenable to surgical intervention or percutaneous drainage.  He improved overall and was deemed stable for discharge home to begin a 3-week course of oral doxycycline hyclate and cephalexin.  We will arrange home health care home physical therapy and Occupational Therapy to assist in his recuperation and we will anticipate him returning to the office for evaluation with us within 2 weeks time.  He will call with any questions or concerns and as always, may call me on my personal cell phone if he has any specific needs.    Procedures Performed         Consults:   Consults     Date and Time Order Name Status Description    9/29/2022 11:34 AM Inpatient Infectious Diseases Consult Completed     9/28/2022 12:34 AM IP Consult to Orthopedic Surgery Completed     9/27/2022 10:05 PM Family Medicine Consult      9/18/2022  1:08 PM IP Consult to General Surgery Completed     9/18/2022 11:29 AM Inpatient Podiatry Consult Completed           Pertinent Test Results:XR ELBOW 2 VIEW LEFT    Result Date: 9/27/2022  1. Negative for fracture. 2. Soft tissue swelling overlying olecranon most suggestive of olecranon bursitis. 3. Chronic changes related to prior ORIF with distal ulnar screw fixation and chronic soft tissue calcifications overlying the posterior aspect of the elbow.  Electronically Signed By-Valente Hoover MD On:9/27/2022 8:20 PM This report was finalized on [id number] by  Valente Hoover  MD.    CT Upper Extremity Left Without Contrast    Result Date: 9/29/2022  1.Limited evaluation due to lack of intravenous contrast and patient's inability to position elbow for standard imaging. 2.Nonspecific edema in the posterior soft tissues with superficial collection at the posterior medial elbow measuring approximately 3.8 x 3.6 x 2.1 cm. This is in the area of the olecranon bursa. Infectious bursitis cannot be confirmed or excluded. If there is suspicion for infectious bursitis, aspiration may be necessary for diagnosis. 3.Nonspecific elbow effusion. If there is suspicion for joint infection, aspiration would be advised. 4.Status post internal fixation of the olecranon without definite hardware complication seen on this exam. 5.Ossifications at the posterior elbow could be related to heterotopic ossification or chronic displaced fracture fragments. Electronically Signed: Joshua Boucher MD 9/29/2022 16:41 EDT      Imaging Results (Last 7 Days)     Procedure Component Value Units Date/Time    CT Upper Extremity Left Without Contrast [[id number]] Collected: 09/29/22 1629     Updated: 09/29/22 1643    Narrative:      CT UPPER EXTREMITY LEFT WO CONTRAST    Date of Exam: 9/29/2022 14:36 EDT    Indication: Concern for septic olecranon bursitis-history of hardware placement.    Comparison: Radiographs September 27, 2022.    Technique: CT of the left elbow was performed without contrast. Coronal and sagittal reformats were obtained.  Automated exposure control and iterative reconstruction methods were used.       FINDINGS: The exam is limited by lack of intravenous contrast, and inability to position elbow for standard imaging.    There is a fixation screw and cerclage wires in the proximal ulna/olecranon. Hardware components appear in similar positions to the prior radiographs. There are ossifications in the posterior soft tissues, superior to the olecranon which may related to   chronic displaced fracture fragments  or heterotopic ossification. No clear donor site is visible at this time.    Mineralization appears within normal limits. No acute displaced fracture is seen. Elbow alignment appears within normal limits. There does not appear to be evidence of significant elbow arthropathy. There does appear to be an elbow effusion, likely at   least moderate in size.    There appears to be edema in the posterior soft tissues. There is focal soft tissue prominence overlying the olecranon as seen on the prior radiographs. There is suspicion for a collection in the superficial soft tissues in this location at the   medial-posterior olecranon measuring approximately 3.6 x 2.1 x 3.8 cm. This is in the area of the olecranon bursa.         Impression:      1.Limited evaluation due to lack of intravenous contrast and patient's inability to position elbow for standard imaging.  2.Nonspecific edema in the posterior soft tissues with superficial collection at the posterior medial elbow measuring approximately 3.8 x 3.6 x 2.1 cm. This is in the area of the olecranon bursa. Infectious bursitis cannot be confirmed or excluded. If   there is suspicion for infectious bursitis, aspiration may be necessary for diagnosis.  3.Nonspecific elbow effusion. If there is suspicion for joint infection, aspiration would be advised.  4.Status post internal fixation of the olecranon without definite hardware complication seen on this exam.  5.Ossifications at the posterior elbow could be related to heterotopic ossification or chronic displaced fracture fragments.        Electronically Signed: Joshua Boucher MD 9/29/2022 16:41 EDT    XR ELBOW 2 VIEW LEFT [[id number]] Collected: 09/27/22 2018     Updated: 09/27/22 2022    Narrative:      DATE OF EXAM:  9/27/2022 7:56 PM     PROCEDURE:  XR ELBOW 2 VW LEFT-     INDICATIONS:  elbow pain; M25.522-Pain in left elbow; M70.22-Olecranon bursitis, left  elbow; T14.8XXA-Other injury of unspecified body region,  initial  encounter; L08.9-Local infection of the skin and subcutaneous tissue,  unspecified     COMPARISON:  05/20/2016     TECHNIQUE:   Two Radiologic views of the left elbow were obtained.     FINDINGS:  There is soft tissue swelling overlying the olecranon. There is a single  fixation screw with cerclage wires again noted at the proximal ulna  extending to the olecranon unchanged in position from prior study. There  are soft tissue calcifications overlying the posterior soft tissues at  level of the distal triceps similar to prior exam. There is no acute  fracture. No definite joint effusion.        Impression:      1. Negative for fracture.  2. Soft tissue swelling overlying olecranon most suggestive of olecranon  bursitis.  3. Chronic changes related to prior ORIF with distal ulnar screw  fixation and chronic soft tissue calcifications overlying the posterior  aspect of the elbow.     Electronically Signed By-Valente Hoover MD On:9/27/2022 8:20 PM  This report was finalized on [id number] by  Valente Hoover MD.              Condition on Discharge:  Fair    Vital Signs  Temp:  [97.4 °F (36.3 °C)-98.2 °F (36.8 °C)] 98.2 °F (36.8 °C)  Heart Rate:  [72-86] 86  Resp:  [16-18] 18  BP: (102-134)/(59-79) 124/70    Physical Exam:     General Appearance:    Alert, cooperative, in no acute distress   Head:    Normocephalic, without obvious abnormality, atraumatic   Eyes:           Conjunctivae and sclerae normal, no   icterus, no pallor, corneas clear, PERRLA   Throat:   No oral lesions, no thrush, oral mucosa moist   Neck:   No adenopathy, supple, trachea midline, no thyromegaly, no   carotid bruit, no JVD   Lungs:     Clear to auscultation,respirations regular, even and                  unlabored    Heart:    Regular rhythm and normal rate, normal S1 and S2, no            murmur, no gallop, no rub, no click   Chest Wall:    No abnormalities observed   Abdomen:     Normal bowel sounds, no masses, no organomegaly, soft         non-tender, non-distended, no guarding, no rebound                tenderness   Rectal:     Deferred   Extremities:   Moves all extremities well, no edema, no cyanosis, no             redness   Pulses:   Pulses palpable and equal bilaterally   Skin:   No bleeding, bruising or rash   Lymph nodes:   No palpable adenopathy   Neurologic:   Cranial nerves 2 - 12 grossly intact, sensation intact, DTR       present and equal bilaterally         Discharge Disposition  Home-Health Care c    Discharge Medications     Discharge Medications      New Medications      Instructions Start Date   cephalexin 500 MG capsule  Commonly known as: KEFLEX   500 mg, Oral, Every 8 Hours Scheduled      doxycycline 100 MG tablet  Commonly known as: ADOXA   100 mg, Oral, Every 12 Hours Scheduled         Continue These Medications      Instructions Start Date   apixaban 5 MG tablet tablet  Commonly known as: ELIQUIS   5 mg, Oral, 2 Times Daily      aspirin 81 MG EC tablet   81 mg, Oral, 3 Times Weekly      atorvastatin 40 MG tablet  Commonly known as: LIPITOR   40 mg, Oral, Every Morning      budesonide 0.5 MG/2ML nebulizer solution  Commonly known as: PULMICORT   0.5 mg, Nebulization, 2 Times Daily - RT      docusate sodium 100 MG capsule  Commonly known as: COLACE   200 mg, Oral, 2 Times Daily      famotidine 20 MG tablet  Commonly known as: PEPCID   20 mg, Oral, 2 Times Daily Before Meals      furosemide 40 MG tablet  Commonly known as: LASIX   40 mg, Oral, Every Morning      hydrOXYzine 25 MG tablet  Commonly known as: ATARAX   25 mg, Oral, Nightly PRN      ipratropium-albuterol 0.5-2.5 mg/3 ml nebulizer  Commonly known as: DUO-NEB   3 mL, Nebulization, 4 Times Daily PRN      isosorbide mononitrate 120 MG 24 hr tablet  Commonly known as: IMDUR   120 mg, Oral, Every 24 Hours Scheduled      metOLazone 5 MG tablet  Commonly known as: ZAROXOLYN   5 mg, Oral, 3 Times Weekly, Monday, Wednesday, Friday    As needed for weight gain.       metoprolol tartrate 50 MG tablet  Commonly known as: LOPRESSOR   50 mg, Oral, 2 Times Daily With Meals      mirtazapine 30 MG tablet  Commonly known as: REMERON   30 mg, Oral, Nightly      nitroglycerin 0.4 MG SL tablet  Commonly known as: NITROSTAT   0.4 mg, Sublingual, Every 5 Minutes PRN      oxyCODONE-acetaminophen  MG per tablet  Commonly known as: Percocet   1 tablet, Oral, Every 6 Hours PRN      pregabalin 150 MG capsule  Commonly known as: LYRICA   150 mg, Oral, 2 Times Daily      ranolazine 1000 MG 12 hr tablet  Commonly known as: RANEXA   1,000 mg, Oral, Every 12 Hours Scheduled      rOPINIRole 1 MG tablet  Commonly known as: REQUIP   1 mg, Oral, Daily      traZODone 100 MG tablet  Commonly known as: DESYREL   100 mg, Oral, Nightly, After meals      Ventolin  (90 Base) MCG/ACT inhaler  Generic drug: albuterol sulfate HFA   2 puffs, Inhalation, Every 4 Hours PRN, May use every 4 to 6 hours      vitamin D 1.25 MG (07732 UT) capsule capsule  Commonly known as: ERGOCALCIFEROL   50,000 Units, Oral, Weekly, Friday             Discharge Diet:   Cardiac ADA  Activity at Discharge:   As tolerated as per physical therapy and Occupational Therapy  Follow-up Appointments  Please refer to the body of the discharge summary  Future Appointments   Date Time Provider Department Center   10/7/2022  1:50 PM Herberth Oconnor MD [facility]   3/16/2023 11:00 AM AMANDA DIAS [place] DEVICE [facility]   3/16/2023 11:30 AM Celine Swanson MD [facility]     Additional Instructions for the Follow-ups that You Need to Schedule     Ambulatory Referral to Home Health (Hospital)   As directed      Face to Face Visit Date: 10/1/2022    Follow-up provider for Plan of Care?: I will be treating the patient on an ongoing basis.  Please send me the Plan of Care for signature.    Follow-up provider: DONALDO CALLES [[id number]]    Reason/Clinical Findings: Olecranon bursitis    Describe  mobility limitations that make leaving home difficult: Physical deconditioning with multiple comorbidities including panlobular COPD, cirrhosis, atherosclerotic heart disease    Nursing/Therapeutic Services Requested: Skilled Nursing Physical Therapy Occupational Therapy    Skilled nursing orders: Medication education Pain management    PT orders: Strengthening    Occupational orders: Activities of daily living Strengthening    Frequency: 1 Week 1               Test Results Pending at Discharge  Pending Labs     Order Current Status    Blood Culture - Blood, Arm, Right Preliminary result    Blood Culture - Blood, Arm, Right Preliminary result    Blood Culture - Blood, Wrist, Right Preliminary result    Blood Culture - Blood, Wrist, Right Preliminary result           Yusuf Jones MD  10/01/22  08:19 EDT

## 2022-10-01 NOTE — PROGRESS NOTES
Infectious Diseases Progress Note      LOS: 4 days   Patient Care Team:  Yusuf Jones MD as PCP - General  PhiJoshua MD as Consulting Physician (Cardiology)  Moise Watson MD as Consulting Physician (Cardiac Electrophysiology)  Izzy Alvarez MD as Consulting Physician (Nephrology)  Celine Swanson MD as Consulting Physician (Cardiology)    Chief Complaint: Left elbow pain and swelling    Subjective       The patient has been afebrile for the last 24 hours.  The patient is on room air, hemodynamically stable, and is tolerating antimicrobial therapy.  Patient states that his elbow is feeling better today      Review of Systems:   Review of Systems   Constitutional: Negative.    HENT: Negative.    Eyes: Negative.    Respiratory: Negative.    Cardiovascular: Negative.    Gastrointestinal: Negative.    Endocrine: Negative.    Genitourinary: Negative.    Musculoskeletal: Positive for joint swelling.        Left elbow pain and swelling-improved   Skin: Negative.    Neurological: Negative.    Psychiatric/Behavioral: Negative.    All other systems reviewed and are negative.       Objective     Vital Signs  Temp:  [97.4 °F (36.3 °C)-98.2 °F (36.8 °C)] 98.1 °F (36.7 °C)  Heart Rate:  [69-86] 69  Resp:  [16-18] 16  BP: (102-144)/(59-79) 144/68    Physical Exam:  Physical Exam  Vitals and nursing note reviewed.   Constitutional:       General: He is not in acute distress.     Appearance: He is well-developed. He is obese. He is ill-appearing. He is not diaphoretic.   HENT:      Head: Normocephalic and atraumatic.   Eyes:      Conjunctiva/sclera: Conjunctivae normal.      Pupils: Pupils are equal, round, and reactive to light.   Cardiovascular:      Rate and Rhythm: Normal rate and regular rhythm.      Heart sounds: Normal heart sounds, S1 normal and S2 normal.   Pulmonary:      Effort: Pulmonary effort is normal. No respiratory distress.      Breath sounds: No stridor. No wheezing or  rales.      Comments: Diminished throughout  Abdominal:      General: Bowel sounds are normal. There is no distension.      Palpations: Abdomen is soft. There is no mass.      Tenderness: There is no abdominal tenderness. There is no guarding.      Comments: Healed abdominal wound   Musculoskeletal:         General: No deformity. Normal range of motion.      Cervical back: Neck supple.      Comments:  Patient has some erythema with a large area of fluctuance at the right elbow-fluctuance has improved today and patient appears to have more range of motion   Skin:     General: Skin is warm and dry.      Coloration: Skin is not pale.      Findings: No erythema or rash.      Comments:  Multiple very superficial wounds to the left lower leg that are weeping    Several very small scabs to bilateral feet-nothing that looks actively infected    Neurological:      Mental Status: He is alert and oriented to person, place, and time.      Cranial Nerves: No cranial nerve deficit.   Psychiatric:         Mood and Affect: Mood normal.          Results Review:    I have reviewed all clinical data, test, lab, and imaging results.     Radiology  No Radiology Exams Resulted Within Past 24 Hours    Cardiology    Laboratory    Results from last 7 days   Lab Units 10/01/22  0454 09/30/22 0425 09/29/22 0141 09/28/22 0313 09/27/22 1932   WBC 10*3/mm3 8.20 7.80 8.30 8.50 8.70   HEMOGLOBIN g/dL 8.7* 8.2* 7.9* 7.9* 8.2*   HEMATOCRIT % 29.2* 26.7* 25.7* 26.3* 26.8*   PLATELETS 10*3/mm3 427 400 335 286 290     Results from last 7 days   Lab Units 09/30/22  0425 09/29/22 0141 09/28/22 0313 09/27/22 1932   SODIUM mmol/L 136 135* 136 136   POTASSIUM mmol/L 3.9 3.5 3.2* 3.0*   CHLORIDE mmol/L 97* 94* 96* 95*   CO2 mmol/L 27.0 28.0 27.0 29.0   BUN mg/dL 17 21 24* 22   CREATININE mg/dL 1.06 1.22 1.42* 1.44*   GLUCOSE mg/dL 215* 173* 170* 146*   ALBUMIN g/dL  --   --   --  3.10*   BILIRUBIN mg/dL  --   --   --  1.0   ALK PHOS U/L  --   --   --   109   AST (SGOT) U/L  --   --   --  20   ALT (SGPT) U/L  --   --   --  7   CALCIUM mg/dL 8.1* 8.2* 7.8* 7.7*         Results from last 7 days   Lab Units 09/27/22 1932   SED RATE mm/hr 27*         Microbiology   Microbiology Results (last 10 days)     Procedure Component Value - Date/Time    Blood Culture - Blood, Arm, Right [[id number]]  (Normal) Collected: 09/28/22 1326    Lab Status: Preliminary result Specimen: Blood from Arm, Right Updated: 09/30/22 1415     Blood Culture No growth at 2 days    Blood Culture - Blood, Wrist, Right [[id number]]  (Normal) Collected: 09/28/22 1052    Lab Status: Preliminary result Specimen: Blood from Wrist, Right Updated: 10/01/22 1118     Blood Culture No growth at 3 days    Blood Culture - Blood, Wrist, Right [[id number]]  (Normal) Collected: 09/27/22 2045    Lab Status: Preliminary result Specimen: Blood from Wrist, Right Updated: 09/30/22 2101     Blood Culture No growth at 3 days    Blood Culture - Blood, Arm, Right [[id number]]  (Normal) Collected: 09/27/22 2045    Lab Status: Preliminary result Specimen: Blood from Arm, Right Updated: 09/30/22 2100     Blood Culture No growth at 3 days    Wound Culture - Drainage, Leg, Left [[id number]]  (Abnormal)  (Susceptibility) Collected: 09/27/22 1935    Lab Status: Final result Specimen: Drainage from Leg, Left Updated: 09/30/22 1147     Wound Culture Heavy growth (4+) Staphylococcus aureus, MRSA     Comment:   Methicillin resistant Staphylococcus aureus, Patient may be an isolation risk.         Heavy growth (4+) Corynebacterium species     Comment:   No definitive guidelines. All are susceptible to vancomycin. Resistance to penicillins & cephalosporins does occur.        Gram Stain Rare (1+) WBCs seen      Rare (1+) Gram positive cocci    Narrative:            Susceptibility      Staphylococcus aureus, MRSA      YASMIN      Clindamycin Resistant     Erythromycin Resistant     Inducible Clindamycin Resistance Negative      Oxacillin  Resistant     Rifampin Susceptible      Tetracycline Susceptible      Trimethoprim + Sulfamethoxazole Susceptible      Vancomycin Susceptible                                 Medication Review:       Schedule Meds  apixaban, 5 mg, Oral, Q12H  aspirin, 81 mg, Oral, Once per day on Mon Wed Fri  atorvastatin, 40 mg, Oral, Daily  budesonide, 0.5 mg, Nebulization, BID - RT  cephalexin, 500 mg, Oral, Q8H  docusate sodium, 200 mg, Oral, BID  doxycycline, 100 mg, Oral, Q12H  famotidine, 20 mg, Oral, BID AC  furosemide, 40 mg, Oral, Daily  insulin lispro, 0-7 Units, Subcutaneous, TID AC  isosorbide mononitrate, 120 mg, Oral, Q24H  metOLazone, 5 mg, Oral, Once per day on Mon Wed Fri  metoprolol tartrate, 50 mg, Oral, BID With Meals  mirtazapine, 30 mg, Oral, Nightly  nystatin, , Topical, Q12H  pregabalin, 150 mg, Oral, BID  ranolazine, 1,000 mg, Oral, Q12H  rOPINIRole, 1 mg, Oral, Nightly  sodium chloride, 3 mL, Intravenous, Q12H  traZODone, 100 mg, Oral, Nightly        Infusion Meds       PRN Meds  benzonatate  •  dextrose  •  dextrose  •  glucagon (human recombinant)  •  HYDROmorphone  •  hydrOXYzine  •  ipratropium-albuterol  •  magnesium sulfate **OR** magnesium sulfate **OR** magnesium sulfate  •  nitroglycerin  •  ondansetron **OR** ondansetron  •  oxyCODONE  •  potassium chloride **OR** potassium chloride **OR** potassium chloride  •  [COMPLETED] Insert peripheral IV **AND** sodium chloride  •  sodium chloride        Assessment & Plan       Antimicrobial Therapy   1.          2.          3.  P.o. Keflex        4.  P.o. doxycycline        5.            Assessment     Presentation concerning for left septic olecranon bursitis.  Patient denies any trauma or injections to that area and states that the pain started spontaneously several days before admission.  There is some erythema, acute tendernes, s and a large area of fluctuance just above the elbow joint.  Patient has limited range of motion due to the pain  -Admits to  previous surgery on that elbow with hardware 5 or 6 years ago after a fall  -Current x-ray shows a olecranon bursitis  -Initial blood cultures are negative  -he reports fever at home but has been afebrile since admission  -CT shows a superficial collection at the posterior medial elbow in the area of the olecranon bursa  -Orthopedic surgery has seen the patient and does not feel that surgical intervention is needed  -Patient has shown some improvement to swelling and range of motion today     Superficial wounds to the left lower leg with weeping-likely due to chronic edema/venous stasis.  Cultures currently growing Corynebacterium.  It is likely skin bacteria colonization  -History of peripheral vascular disease     Recent admission for bowel obstruction without surgery     Our service last saw the patient in August 2021 for MRSA bacteremia with a abdominal wound infected with MRSA.  Received 4 weeks of IV vancomycin     History of pacemaker placement, congestive heart failure, CAD     History of alcohol cirrhosis     Chronic kidney disease stage III     Type 2 diabetes     COPD     Plan     Continue p.o. Keflex 500 mg 3 times daily for 3 weeks  Continue p.o. doxycycline 100 mg twice daily for 3 weeks  If patient fails the above treatment then he will likely need surgical intervention  Continue supportive care  Okay to discharge from Infectious Disease standpoint            Jaye Green, KOURTNEY  10/01/22  12:55 EDT    Note is dictated utilizing voice recognition software/Dragon

## 2022-10-01 NOTE — PLAN OF CARE
Goal Outcome Evaluation:  Plan of Care Reviewed With: patient        Progress: improving  Outcome Evaluation: Slept at intervals.  Requesting IV pain meds every 2 hours for left elbow pain. Remains on room air. Will continue to monitor.

## 2022-10-02 NOTE — OUTREACH NOTE
Prep Survey    Flowsheet Row Responses   [facility] facility [facility] discharged from? [facility]   Is LACE score < 7 ? No   Emergency Room discharge w/ pulse ox? No   Eligibility Readm Mgmt   Discharge diagnosis Olecranon bursitis of left elbow   Does the patient have one of the following disease processes/diagnoses(primary or secondary)? Other   Does the patient have Home health ordered? Yes   What [facility]    Is there a DME ordered? No   Prep survey completed? Yes          CECI SLAUGHTER - Registered Nurse

## 2022-10-03 NOTE — CASE MANAGEMENT/SOCIAL WORK
Case Management Discharge Note      Final Note: DISCHARGED HOME WITH [facility]     Home Medical Care Coordination complete.    Service Provider Selected Services Address Phone Fax Patient Preferred    [facility] [address] [place], [place] [id number] [phone] --           Transportation Services  Private: Car    Final Discharge Disposition Code: 06 - home with home health care

## 2022-10-04 NOTE — OUTREACH NOTE
Medical Week 1 Survey    Flowsheet Row Responses   [facility] patient discharged from? [facility]   Does the patient have one of the following disease processes/diagnoses(primary or secondary)? Other   Week 1 attempt successful? Yes   Call start time 1813   Call end time 1816   Discharge diagnosis Olecranon bursitis of left elbow   Meds reviewed with patient/caregiver? Yes   Is the patient having any side effects they believe may be caused by any medication additions or changes? No   Does the patient have all medications ordered at discharge? Yes   Is the patient taking all medications as directed (includes completed medication regime)? Yes   Does the patient have a primary care provider?  Yes   Does the patient have an appointment with their PCP within 7 days of discharge? Greater than 7 days   Has the patient kept scheduled appointments due by today? N/A   [facility]    Has home health visited the patient within 72 hours of discharge? No   Home health comments States they called today and are to be there tomorrow.   Psychosocial issues? No   Did the patient receive a copy of their discharge instructions? Yes   Nursing interventions Reviewed instructions with patient   What is the patient's perception of their health status since discharge? Improving   Is the patient/caregiver able to teach back signs and symptoms related to disease process for when to call PCP? Yes   Is the patient/caregiver able to teach back signs and symptoms related to disease process for when to call 911? Yes   Is the patient/caregiver able to teach back the hierarchy of who to call/visit for symptoms/problems? PCP, Specialist, Home health nurse, Urgent Care, ED, 911 Yes   Additional teach back comments States his elbow was hurting this morning but is better now and the swelling has gone done.  Educated to make sure to complete antibiotics.   Week 1 call completed? Yes   Wrap up additional  comments Denies questions or needs at this time.          KITA PAGE - Licensed Nurse
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
## 2022-10-12 PROBLEM — E87.5 HYPERKALEMIA: Status: ACTIVE | Noted: 2022-01-01

## 2022-10-12 PROBLEM — D64.9 ANEMIA: Status: ACTIVE | Noted: 2022-01-01

## 2022-10-12 NOTE — OUTREACH NOTE
Medical Week 2 Survey    Flowsheet Row Responses   [facility] facility patient discharged from? [facility]   Does the patient have one of the following disease processes/diagnoses(primary or secondary)? Other   Week 2 attempt successful? No   Unsuccessful attempts Attempt 1   Revoke Readmitted          GELA VILLASENOR - Registered Nurse

## 2022-10-18 NOTE — ANESTHESIA POSTPROCEDURE EVALUATION
Patient: Ro Nathan    Procedure Summary     Date: 10/18/22 Room / Location: [facility] OR 11 / [facility] MAIN OR    Anesthesia Start: 0754 Anesthesia Stop: 0910    Procedure: ELBOW HARDWARE REMOVAL (Left: Elbow) Diagnosis:       Elbow pain, left      (Elbow pain, left [M25.522])    Surgeons: Brad Jackson MD Provider: Chris Carr MD    Anesthesia Type: general with block ASA Status: 4          Anesthesia Type: general with block    Vitals  Vitals Value Taken Time   /54 10/18/22 0939   Temp 97.2 °F (36.2 °C) 10/18/22 0909   Pulse 70 10/18/22 0939   Resp 17 10/18/22 0925   SpO2 92 % 10/18/22 0939   Vitals shown include unvalidated device data.        Post Anesthesia Care and Evaluation    Patient location during evaluation: PACU  Patient participation: complete - patient participated  Level of consciousness: sleepy but conscious and responsive to verbal stimuli  Pain score: 0  Pain management: adequate    Airway patency: patent  Anesthetic complications: No anesthetic complications  PONV Status: none  Cardiovascular status: acceptable  Respiratory status: acceptable  Hydration status: acceptable

## 2022-10-18 NOTE — ANESTHESIA PROCEDURE NOTES
Peripheral Block      Patient reassessed immediately prior to procedure    Patient location during procedure: pre-op  Start time: 10/18/2022 7:24 AM  Reason for block: at surgeon's request and post-op pain management  Performed by  Anesthesiologist: Chris Carr MD  Preanesthetic Checklist  Completed: patient identified, IV checked, site marked, risks and benefits discussed, surgical consent, monitors and equipment checked, pre-op evaluation and timeout performed  Prep:  Pt Position: supine  Sterile barriers:cap, gloves, sterile barriers and alcohol skin prep  Prep: ChloraPrep  Patient monitoring: blood pressure monitoring, continuous pulse oximetry and EKG  Procedure    Sedation: yes  Performed under: local infiltration  Guidance:ultrasound guided    ULTRASOUND INTERPRETATION.  Using ultrasound guidance a gauge needle was placed in close proximity to the brachial plexus nerve, at which point, under ultrasound guidance anesthetic was injected in the area of the nerve and spread of the anesthesia was seen on ultrasound in close proximity thereto.  There were no abnormalities seen on ultrasound; a digital image was taken; and the patient tolerated the procedure with no complications. Images:still images obtained, printed/placed on chart    Laterality:left  Block Type:supraclavicular  Injection Technique:single-shot  Needle Type:echogenic  Needle Gauge:20 G  Resistance on Injection: none  Sedation medications used: midazolam (VERSED) injection - Intravenous   2 mg - 10/18/2022 7:24:00 AM  fentaNYL citrate (PF) (SUBLIMAZE) injection - Intravenous   50 mcg - 10/18/2022 7:24:00 AM  Medications Used: dexamethasone (DECADRON) injection - Injection   4 mg - 10/18/2022 7:24:00 AM  ropivacaine (NAROPIN) 0.5 % injection - Injection   24 mL - 10/18/2022 7:24:00 AM      Medications  Comment:Ultrasound visualization of needle and local anesthetic spread.    Post Assessment  Injection Assessment: negative aspiration for heme, no  paresthesia on injection and incremental injection  Patient Tolerance:comfortable throughout block  Complications:no

## 2022-10-18 NOTE — ANESTHESIA PREPROCEDURE EVALUATION
Anesthesia Evaluation     Patient summary reviewed and Nursing notes reviewed   NPO Solid Status: > 8 hours  NPO Liquid Status: > 4 hours           Airway   Mallampati: I  TM distance: >3 FB  Neck ROM: full  No difficulty expected  Dental - normal exam     Pulmonary - normal exam   (+) COPD, sleep apnea,   Cardiovascular - normal exam    ECG reviewed    (+) pacemaker pacemaker, hypertension well controlled, CAD, cardiac stents dysrhythmias Atrial Fib, angina, PVD,     ROS comment: Card ECHO 04/2022:  Impression  Moderate mitral and tricuspid regurgitation.  Biatrial enlargement.  Calculated pulmonary artery pressure is 40 mmHg.  Left ventricular apical and septal hypokinesis with ejection fraction of 40%.         Neuro/Psych  (+) numbness,    GI/Hepatic/Renal/Endo    (+) morbid obesity, hiatal hernia, GERD well controlled, GI bleeding , liver disease cirrhosis, renal disease CRI, diabetes mellitus,     Musculoskeletal     Abdominal   (+) obese,     Bowel sounds: normal.   Substance History   (+) alcohol use,      OB/GYN negative ob/gyn ROS         Other   arthritis, blood dyscrasia anemia,   history of cancer remission                  Anesthesia Plan    ASA 4     general with block     intravenous induction     Anesthetic plan, risks, benefits, and alternatives have been provided, discussed and informed consent has been obtained with: patient.        CODE STATUS:    Code Status (Patient has no pulse and is not breathing): CPR (Attempt to Resuscitate)  Medical Interventions (Patient has pulse or is breathing): Full Support

## 2022-10-18 NOTE — ANESTHESIA PROCEDURE NOTES
Airway  Urgency: elective      General Information and Staff    Patient location during procedure: OR  Anesthesiologist: Chris Carr MD    Indications and Patient Condition  Indications for airway management: airway protection    Preoxygenated: yes  MILS not maintained throughout  Mask difficulty assessment: 0 - not attempted    Final Airway Details  Final airway type: endotracheal airway      Successful airway: ETT  Cuffed: yes   Successful intubation technique: direct laryngoscopy  Endotracheal tube insertion site: oral  Blade: Garce  Blade size: 4  ETT size (mm): 7.5  Cormack-Lehane Classification: grade I - full view of glottis  Placement verified by: capnometry   Measured from: gums  ETT/EBT to gums (cm): 23  Number of attempts at approach: 1  Assessment: lips, teeth, and gum same as pre-op and atraumatic intubation

## 2022-10-20 PROBLEM — I25.119 ATHSCL HEART DISEASE OF NATIVE COR ART W UNSP ANG PCTRS (HCC): Status: ACTIVE | Noted: 2022-01-01

## 2022-10-20 PROBLEM — J43.1 PANLOBULAR EMPHYSEMA (HCC): Status: ACTIVE | Noted: 2022-01-01

## 2022-10-20 PROBLEM — I48.0 PAROXYSMAL ATRIAL FIBRILLATION (HCC): Status: ACTIVE | Noted: 2022-01-01

## 2022-10-21 NOTE — OUTREACH NOTE
Prep Survey    Flowsheet Row Responses   [facility] discharged from? [facility]   Is LACE score < 7 ? No   Emergency Room discharge w/ pulse ox? No   Eligibility Readm Mgmt   Discharge diagnosis ELBOW HARDWARE REMOVAL with olecranon bursa excision   Does the patient have one of the following disease processes/diagnoses(primary or secondary)? General Surgery   Does the patient have Home health ordered? Yes   What is the [facility] health agency[facility]   Is there a DME ordered? Yes   What DME was ordered? [facility]   Prep survey completed? Yes          JARET PAGE - Registered Nurse

## 2022-10-24 NOTE — TELEPHONE ENCOUNTER
Hub staff attempted to follow warm transfer process and was unsuccessful     Caller: LONI SIDHU call back number: [phone]    Patient is needing: PT IS CALLING TO SCHEDULE 2 WEEK POST OP WITH DR HENSON AND WE HAVE NOTHING IN TIMEFRAME.

## 2022-10-24 NOTE — OUTREACH NOTE
General Surgery Week 1 Survey    Flowsheet Row Responses   [facility] patient discharged from? [facility]   Does the patient have one of the following disease processes/diagnoses(primary or secondary)? General Surgery   Week 1 attempt successful? Yes   Call start time 1231   Call end time 1234   Discharge diagnosis ELBOW HARDWARE REMOVAL with olecranon bursa excision   Person spoke with today (if not patient) and taina Griffins reviewed with patient/caregiver? Yes   Is the patient having any side effects they believe may be caused by any medication additions or changes? No   Does the patient have all medications related to this admission filled (includes all antibiotics, pain medications, etc.) Yes   Prescription comments Spouse reports the surgeon's office called this morning and reviewed medications at that time.   Is the patient taking all medications as directed (includes completed medication regime)? Yes   Does the patient have a follow up appointment scheduled with their surgeon? No   What is preventing the patient from scheduling follow up appointments? Waiting on return call   Has the patient kept scheduled appointments due by today? N/A   Comments Pt states surgeon's office called earlier and will be calling back this afternoon to schedule a post-op appt.   [facility]    Has home health visited the patient within 72 hours of discharge? Yes   Home health comments  has visited on friday   What DME was ordered? [facility]   Psychosocial issues? No   Did the patient receive a copy of their discharge instructions? Yes   Nursing interventions Reviewed instructions with patient   What is the patient's perception of their health status since discharge? Improving   If the patient is a current smoker, are they able to teach back resources for cessation? Not a smoker   Is the patient/caregiver able to teach back the hierarchy of who to call/visit for symptoms/problems? PCP,  Specialist, Home health nurse, Urgent Care, ED, 911 Yes   Week 1 call completed? Yes   Wrap up additional comments Spouse reports patient is doing well at home.  Denies needs/questions at this time.          ELAN MARTINI - Registered Nurse

## 2022-10-31 NOTE — TELEPHONE ENCOUNTER
Rx Refill Note  Requested Prescriptions     Pending Prescriptions Disp Refills   • Eliquis 5 MG tablet tablet [Pharmacy Med Name: ELIQUIS 5 MG TABLET] 180 tablet 3     Sig: TAKE ONE TABLET BY MOUTH TWICE A DAY      Last office visit with prescribing clinician: 8/30/2022      Next office visit with prescribing clinician: 3/16/2023            Dilma Kelley MA  10/31/22, 08:57 EDT

## 2022-11-01 NOTE — OUTREACH NOTE
General Surgery Week 2 Survey    Flowsheet Row Responses   [facility] facility patient discharged from? [facility]   Does the patient have one of the following disease processes/diagnoses(primary or secondary)? General Surgery   Week 2 attempt successful? No   Unsuccessful attempts Attempt 1          JESÚS Liz Registered Nurse

## 2022-11-04 NOTE — DISCHARGE SUMMARY
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  Peptic ulcer disease  History of erosive gastritis  History of grade B esophagitis  Gastroesophageal reflux disease with esophagitis  Herbert's esophageal change  DM II with renal manifestations  DMII with angiopathic manifestations  DMII with neuropathic manifestations  Diabetic dyslipidemia  Panlobular COPD with emphysema  Nicotine dependency with nicotine use disorder, cigarettes  DDD L/S  CKD II  HTN associated with CKD II  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  Cirrhosis  Small vessel PAD secondary to diabetes  Diabetic lower extremity ulcerations  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, MOSD  History of right renal mass  3.4 cm infrarenal abdominal aortic aneurysm  Valvular heart disease with significant mitral regurgitation  Paroxysmal atrial fibrillation  Hypercoagulable state secondary to atrial fibrillation  Chronic oral anticoagulation therapy with direct thrombin inhibition  ROS  ACD  Chronic systolic congestive heart failure  Chronic hypoxic respiratory failure  Supplemental oxygen dependency  Chronic mucopurulent bronchitis  History of prostate cancer  Morbid exogenous obesity  Hypoventilation apnea syndrome with ARTI  Peripheral polyneuropathy secondary to alcohol and diabetes  Autonomic neuropathy secondary to diabetes with autonomic dysfunction syndrome  Monoclonal paraproteinemia  Narcotic dependency  Vitamin D deficiency  ETOHISM  H/O pacemaker placement      Presenting Problem/History of Present Illness  Active Hospital Problems    Diagnosis  POA   • **Olecranon bursitis of left elbow [M70.22]  Yes   • Hyperkalemia [E87.5]  Yes   • Anemia [D64.9]  Yes   • Elbow pain, left [M25.522]  Yes      Resolved Hospital Problems   No resolved problems to display.           Hospital Course  Patient is a 77 y.o. male who presented with an acute septic bursitis with failure of outpatient treatment.  The patient was admitted and evaluated by orthopedic surgery.  He underwent elbow hardware removal with olecranon bursa excision and improved overall.  He responded antimicrobial therapy and was sent home with medications per the discharge reconciliation and will follow up with us in the office within 1 to 2 weeks time.  He will also follow-up in the office with orthopedic surgery within 1 month.  He will call with any questions or concerns via my cell phone    Procedures Performed    Procedure(s):  ELBOW HARDWARE REMOVAL  -------------------       Consults:   Consults     Date and Time Order Name Status Description    10/12/2022  1:32 PM Inpatient Orthopedic Surgery Consult Completed     10/12/2022 12:34 AM Inpatient Infectious Diseases Consult Completed     10/11/2022  6:04 PM Inpatient Orthopedic Surgery Consult Completed     9/29/2022 11:34 AM Inpatient Infectious Diseases Consult Completed     9/28/2022 12:34 AM IP Consult to Orthopedic Surgery Completed     9/18/2022  1:08 PM IP Consult to General Surgery Completed     9/18/2022 11:29 AM Inpatient Podiatry Consult Completed           Pertinent Test Results:No radiology results for the last 7 days    Imaging Results (Last 7 Days)     Procedure Component Value Units Date/Time    XR Chest 1 View [[id number]] Collected: 10/19/22 1527     Updated: 10/19/22 1530    Narrative:      DATE OF EXAM:  10/19/2022 3:10 PM     PROCEDURE:  XR CHEST 1 VW-     INDICATIONS:  confirm right sided picc tip location; M70.22-Olecranon bursitis, left  elbow; R73.9-Hyperglycemia, unspecified; M25.522-Pain in left elbow       COMPARISON:  AP portable chest 7/5/2022     TECHNIQUE:   Single radiographic view of the chest was obtained.     FINDINGS:  Bibasilar atelectasis is present, new since the prior study. Heart size  is upper limits normal but stable.  Pulmonary vascular distribution is  normal. Left chest wall pacemaker is unchanged. No pleural effusion or  pneumothorax or acute osseous abnormalities are seen. Right arm approach  PICC tip terminates at the lower SVC level.       Impression:         1. Right arm approach PICC tip terminates at the lower SVC level. There  is no visible pneumothorax.  2. Bibasilar atelectasis, new since 7/5/2022.     Electronically Signed By-Ekaterina Goins MD On:10/19/2022 3:28 PM  This report was finalized on [id number] by  Ekaterina Goins MD.    IR Inject/asp medium joint or bursa [[id number]] Collected: 10/13/22 1635     Updated: 10/13/22 1642    Narrative:      DATE OF EXAM:  10/13/2022 3:40 PM     PROCEDURE:  IR INJECT/ASP MEDIUM JOINT OR BURSA-     INDICATIONS:  Drain placement to purulent fluid collection in left elbow.  We were  able to express a large amount of pus yesterday-discussed with  orthopedic surgery they were fine with placing a drain in this area  today if possible-; M70.22-Olecranon bursitis, left elbow;  R73.9-Hyperglycemia, unspecified     COMPARISON:  No Comparisons Available     FLUOROSCOPIC TIME:  None     PHYSICIAN MONITORED CONSCIOUS SEDATION TIME:  None minutes     TECHNIQUE:   After informed consent was obtained a timeout was performed. The  interventional radiology nurse monitored the patient all times. Limited  ultrasound was performed in the region of the left elbow. There was a  complex echogenic collection in the region of erythema and surrounding  the elbow. An appropriate access site was selected and the area was  prepped and draped in the usual sterile fashion. The skin was  anesthetized with 1% lidocaine. Next a 18-gauge needle, a 4 Gren[place] Yueh  needle, and a 5 Gren[place] Yueh needle were advanced into different areas of  the collection in an attempt to aspirate fluid. However, the fluid was  too thick for aspiration. Only approximately 1 mL of fluid was able to  be obtained and it was sent for  analysis. The needle was removed. The  patient tolerated procedure well without immediate complication..     FINDINGS:  See above        Impression:         1. Essentially unsuccessful FNA of complex fluid collection surrounding  the left elbow, using multiple sized catheters and needles.. The  collection is too thick for percutaneous drainage or aspiration. The  collection likely needs surgical I and D.     Electronically Signed By-Jc Camara MD On:10/13/2022 4:39 PM  This report was finalized on [id number] by  Jc Camara MD.              Condition on Discharge:  Fair    Vital Signs       Physical Exam:     General Appearance:    Alert, cooperative, in no acute distress   Head:    Normocephalic, without obvious abnormality, atraumatic   Eyes:           Conjunctivae and sclerae normal, no   icterus, no pallor, corneas clear, PERRLA   Throat:   No oral lesions, no thrush, oral mucosa moist   Neck:   No adenopathy, supple, trachea midline, no thyromegaly, no   carotid bruit, no JVD   Lungs:     Clear to auscultation,respirations regular, even and                  unlabored    Heart:    Regular rhythm and normal rate, normal S1 and S2, no            murmur, no gallop, no rub, no click   Chest Wall:    No abnormalities observed   Abdomen:     Normal bowel sounds, no masses, no organomegaly, soft        non-tender, non-distended, no guarding, no rebound                tenderness   Rectal:     Deferred   Extremities:   Moves all extremities well, no edema, no cyanosis, no             redness   Pulses:   Pulses palpable and equal bilaterally   Skin:   No bleeding, bruising or rash   Lymph nodes:   No palpable adenopathy   Neurologic:   Cranial nerves 2 - 12 grossly intact, sensation intact, DTR       present and equal bilaterally         Discharge Disposition  Home-Health Care Svc    Discharge Medications     Discharge Medications      New Medications      Instructions Start Date   cefTRIAXone 2 g in sodium  chloride 0.9 % 100 mL IVPB   2 g, Intravenous, Every 24 Hours      vancomycin   1,500 mg, Intravenous, Every 24 Hours         Continue These Medications      Instructions Start Date   aspirin 81 MG EC tablet   81 mg, Oral, 3 Times Weekly      atorvastatin 40 MG tablet  Commonly known as: LIPITOR   40 mg, Oral, Every Morning      budesonide 0.5 MG/2ML nebulizer solution  Commonly known as: PULMICORT   0.5 mg, Nebulization, 2 Times Daily - RT      docusate sodium 100 MG capsule  Commonly known as: COLACE   200 mg, Oral, 2 Times Daily      famotidine 20 MG tablet  Commonly known as: PEPCID   20 mg, Oral, 2 Times Daily Before Meals      furosemide 40 MG tablet  Commonly known as: LASIX   40 mg, Oral, Every Morning      glimepiride 2 MG tablet  Commonly known as: AMARYL   2 mg, Oral, Daily      hydrOXYzine 25 MG tablet  Commonly known as: ATARAX   25 mg, Oral, Nightly PRN      ipratropium-albuterol 0.5-2.5 mg/3 ml nebulizer  Commonly known as: DUO-NEB   3 mL, Nebulization, 4 Times Daily PRN      isosorbide mononitrate 120 MG 24 hr tablet  Commonly known as: IMDUR   120 mg, Oral, Every 24 Hours Scheduled      metOLazone 5 MG tablet  Commonly known as: ZAROXOLYN   5 mg, Oral, 3 Times Weekly, Monday, Wednesday, Friday    As needed for weight gain.      metoprolol tartrate 50 MG tablet  Commonly known as: LOPRESSOR   50 mg, Oral, 2 Times Daily With Meals      mirtazapine 30 MG tablet  Commonly known as: REMERON   30 mg, Oral, Nightly      nitroglycerin 0.4 MG SL tablet  Commonly known as: NITROSTAT   0.4 mg, Sublingual, Every 5 Minutes PRN      oxyCODONE-acetaminophen  MG per tablet  Commonly known as: Percocet   1 tablet, Oral, Every 4 Hours PRN      pregabalin 150 MG capsule  Commonly known as: LYRICA   150 mg, Oral, 2 Times Daily      ranolazine 1000 MG 12 hr tablet  Commonly known as: RANEXA   1,000 mg, Oral, Every 12 Hours Scheduled      rOPINIRole 1 MG tablet  Commonly known as: REQUIP   1 mg, Oral, Daily       sucralfate 1 g tablet  Commonly known as: CARAFATE   1 g, Oral, 4 Times Daily      traZODone 100 MG tablet  Commonly known as: DESYREL   100 mg, Oral, Nightly, After meals      Ventolin  (90 Base) MCG/ACT inhaler  Generic drug: albuterol sulfate HFA   2 puffs, Inhalation, Every 4 Hours PRN, May use every 4 to 6 hours      vitamin D 1.25 MG (36731 UT) capsule capsule  Commonly known as: ERGOCALCIFEROL   50,000 Units, Oral, Weekly, Friday         Stop These Medications    cephalexin 500 MG capsule  Commonly known as: KEFLEX     doxycycline 100 MG tablet  Commonly known as: ADOXA            Discharge Diet:   ADA 1800-calorie cardiac  Activity at Discharge:   As tolerated  Follow-up Appointments  Future Appointments   Date Time Provider Department Center   11/7/2022 10:00 AM Brad Jackson MD [facility]   1/3/2023  3:00 PM Herberth Oconnor MD [facility]   3/16/2023 11:00 AM [facility] [place] DEVICE CHECK [facility]   3/16/2023 11:30 AM Celine Swanson MD [facility]     Additional Instructions for the Follow-ups that You Need to Schedule     Ambulatory Referral to Home Health (Hospital)   As directed      Face to Face Visit Date: 10/20/2022    Follow-up provider for Plan of Care?: I treated the patient in an acute care facility and will not continue treatment after discharge.    Follow-up provider: YUSUF JONES [[id number]]    Reason/Clinical Findings: cellulitis/bursitis left elbow    Describe mobility limitations that make leaving home difficult: Home IV antibiotic therapy; difficulty with ambulation    Nursing/Therapeutic Services Requested: Skilled Nursing    Skilled nursing orders: PICC line care/instruction    Frequency: 1 Week 1               Test Results Pending at Discharge       Ysuuf Jones MD  11/04/22  08:44 EDT

## 2022-11-07 NOTE — PROGRESS NOTES
"     Patient ID: Ro Nathan is a 77 y.o. male.    10/18/22 left elbow hard removal and irrigation debridement  Pain controlled on antibiotics    Objective:    Ht 182.9 cm (72\")   Wt 127 kg (280 lb)   BMI 37.97 kg/m²     Physical Examination:    Incision line healed no sign of infection or remaining fluid collection drain is removed mild serous drainage from the drain site  Sensory and motor exam are intact all distributions. Radial pulse is palpable and capillary refill is less than two seconds to all digits.    Imaging:      Assessment:  Doing well after olecranon hardware removal    Plan:  Okay to shower keep incision covered otherwise for the next 2 to 3 weeks see me in 3 weeks continue antibiotics per infectious disease service  "

## 2022-11-16 NOTE — CONSULTS
Diabetes Education    Patient Name:  Ro Nathan  YOB: 1945  MRN: [id number]  Admit Date:  4/5/2021        MD consult to teach blood glucose monitoring. Patient was here in January 2021 and given an Accu-chek Guide Me glucometer and educated for newly diagnosed diabetes. Wife at bedside stated the glucometer is at home but has never been used and there was no prescriptions for lancets or test strips. Wife stated that she would bring in glucometer tomorrow morning around 10:30 so she could be shown how to check patient's blood sugar.  Patient is alert and oriented but stated he doesn't remember having education on diabetes.  Wife stated she would look to see if she could found the handouts that patient was given in January and bring them with her tomorrow.  Wife stated that patient has never been started on any meds for diabetes. Admission blood sugar 286.  Prescriptions started in discharge orders for lancets, test strips, and alcohol wipes. Will follow-up with tomorrow to teach wife blood glucose monitoring.         Electronically signed by:  Lucie Jara RN  04/07/21 15:38 EDT   [de-identified] : Consent:\par The risks and benefits of the procedure were discussed with the patient in detail.  Upon verbal consent of the patient, we proceeded with the Supartz injections as noted below.  \par \par Indications: Osteoarthritis, right knee\par                    Osteoarthritis, left knee\par :  SumUp\par NDC#:  [address]-69\par Lot#:    [id number]\par Expiration Date:   11/30/23\par \par Procedure:\par After sterile prep, the patient underwent a Supartz injection of 25 mg of sodium hyaluronate in a 2.5 mL syringe into the right and left knee.  The patient tolerated the procedures well.  There were no complications.  \par \par \par Plan:\par I have recommended ice and elevation.  The patient will be reassessed in six to eight weeks for the right and left knee osteoarthritis. \par

## 2022-11-26 ENCOUNTER — INPATIENT HOSPITAL (OUTPATIENT)
Dept: URBAN - METROPOLITAN AREA HOSPITAL 84 | Facility: HOSPITAL | Age: 77
End: 2022-11-26
Payer: MEDICARE

## 2022-11-26 DIAGNOSIS — R55 SYNCOPE AND COLLAPSE: ICD-10-CM

## 2022-11-26 DIAGNOSIS — Z87.11 PERSONAL HISTORY OF PEPTIC ULCER DISEASE: ICD-10-CM

## 2022-11-26 DIAGNOSIS — D64.9 ANEMIA, UNSPECIFIED: ICD-10-CM

## 2022-11-26 DIAGNOSIS — K76.0 FATTY (CHANGE OF) LIVER, NOT ELSEWHERE CLASSIFIED: ICD-10-CM

## 2022-11-26 DIAGNOSIS — K46.9 UNSPECIFIED ABDOMINAL HERNIA WITHOUT OBSTRUCTION OR GANGRENE: ICD-10-CM

## 2022-11-26 PROBLEM — D62 ACUTE BLOOD LOSS ANEMIA: Status: ACTIVE | Noted: 2022-01-01

## 2022-11-26 PROCEDURE — 99222 1ST HOSP IP/OBS MODERATE 55: CPT | Performed by: NURSE PRACTITIONER

## 2022-11-27 ENCOUNTER — INPATIENT HOSPITAL (OUTPATIENT)
Dept: URBAN - METROPOLITAN AREA HOSPITAL 84 | Facility: HOSPITAL | Age: 77
End: 2022-11-27
Payer: MEDICARE

## 2022-11-27 DIAGNOSIS — K31.819 ANGIODYSPLASIA OF STOMACH AND DUODENUM WITHOUT BLEEDING: ICD-10-CM

## 2022-11-27 DIAGNOSIS — D50.0 IRON DEFICIENCY ANEMIA SECONDARY TO BLOOD LOSS (CHRONIC): ICD-10-CM

## 2022-11-27 DIAGNOSIS — K46.9 UNSPECIFIED ABDOMINAL HERNIA WITHOUT OBSTRUCTION OR GANGRENE: ICD-10-CM

## 2022-11-27 DIAGNOSIS — K92.1 MELENA: ICD-10-CM

## 2022-11-27 PROCEDURE — 43270 EGD LESION ABLATION: CPT | Performed by: INTERNAL MEDICINE

## 2022-11-27 NOTE — ANESTHESIA POSTPROCEDURE EVALUATION
Patient: Ro Nathan    Procedure Summary     Date: 11/27/22 Room / Location: [facility] ENDOSCOPY 1 / [facility] ENDOSCOPY    Anesthesia Start: 1312 Anesthesia Stop: 1335    Procedure: ESOPHAGOGASTRODUODENOSCOPY Diagnosis:       Upper GI bleed      Acute blood loss anemia      Iron deficiency anemia due to chronic blood loss      (Upper GI bleed [K92.2])      (Acute blood loss anemia [D62])      (Iron deficiency anemia due to chronic blood loss [D50.0])    Surgeons: Douglas Delgadillo MD Provider: Garret Anderson MD    Anesthesia Type: MAC ASA Status: 4          Anesthesia Type: MAC    Vitals  No vitals data found for the desired time range.          Post Anesthesia Care and Evaluation    Patient location during evaluation: PACU  Patient participation: complete - patient participated  Level of consciousness: awake  Pain scale: See nurse's notes for pain score.  Pain management: adequate    Airway patency: patent  Anesthetic complications: No anesthetic complications  PONV Status: none  Cardiovascular status: acceptable  Respiratory status: acceptable  Hydration status: acceptable    Comments: Douoneb prn wheeze.    Patient seen and examined postoperatively; vital signs stable; SpO2 greater than or equal to 90%; cardiopulmonary status stable; nausea/vomiting adequately controlled; pain adequately controlled; no apparent anesthesia complications; patient discharged from anesthesia care when discharge criteria were met

## 2022-11-27 NOTE — ANESTHESIA PREPROCEDURE EVALUATION
Anesthesia Evaluation     Patient summary reviewed and Nursing notes reviewed   NPO Solid Status: > 8 hours  NPO Liquid Status: > 4 hours           Airway   Mallampati: I  TM distance: >3 FB  Neck ROM: full  No difficulty expected  Dental - normal exam     Pulmonary - normal exam   (+) COPD severe, sleep apnea,   Cardiovascular - normal exam    ECG reviewed    (+) pacemaker pacemaker, hypertension well controlled, CAD, cardiac stents dysrhythmias Atrial Fib, angina, PVD,     ROS comment: Card ECHO 04/2022:  Impression  Moderate mitral and tricuspid regurgitation.  Biatrial enlargement.  Calculated pulmonary artery pressure is 40 mmHg.  Left ventricular apical and septal hypokinesis with ejection fraction of 40%.         Neuro/Psych  (+) numbness,    GI/Hepatic/Renal/Endo    (+) morbid obesity, hiatal hernia, GERD well controlled, GI bleeding , liver disease cirrhosis, renal disease CRI, diabetes mellitus,     Musculoskeletal     Abdominal   (+) obese,     Bowel sounds: normal.   Substance History   (+) alcohol use,      OB/GYN negative ob/gyn ROS         Other   arthritis, blood dyscrasia anemia,   history of cancer remission                      Anesthesia Plan    ASA 4     MAC   total IV anesthesia  intravenous induction     Anesthetic plan, risks, benefits, and alternatives have been provided, discussed and informed consent has been obtained with: patient.        CODE STATUS:

## 2022-11-30 PROBLEM — I08.1 RHEUMATIC DISORDERS OF BOTH MITRAL AND TRICUSPID VALVES: Status: ACTIVE | Noted: 2022-01-01

## 2022-12-01 NOTE — CASE MANAGEMENT/SOCIAL WORK
Case Management Discharge Note      Final Note: home with [facility]    Provided Post Acute Provider List?: N/A  N/A Provider List Comment: denies dc needs    Selected Continued Care - Discharged on 11/30/2022 Admission date: 11/26/2022 - Discharge disposition: Home or Self Care        Home Medical Care Coordination complete.    Service Provider Selected Services Address Phone Fax Patient Preferred    [facility] [address] [place], [place] [id number] [phone] --                        Transportation Services  Private: Car    Final Discharge Disposition Code: 06 - home with home health care

## 2022-12-05 NOTE — OUTREACH NOTE
Medical Week 1 Survey    Flowsheet Row Responses   [facility] patient discharged from? [facility]   Does the patient have one of the following disease processes/diagnoses(primary or secondary)? Other   Week 1 attempt successful? Yes   Call start time 0909   Call end time 0912   Discharge diagnosis Acute GI bleedHistory of recurrent small bowel obstruction secondary to inoperable massive ventral    Person spoke with today (if not patient) and relationship Meghan-spouse   Meds reviewed with patient/caregiver? Yes   Is the patient having any side effects they believe may be caused by any medication additions or changes? No   Does the patient have all medications ordered at discharge? Yes   Is the patient taking all medications as directed (includes completed medication regime)? Yes   Does the patient have a primary care provider?  Yes   Does the patient have an appointment with their PCP within 7 days of discharge? Yes   Comments regarding PCP 12/5/22   Has the patient kept scheduled appointments due by today? Yes   What is the Home health agency?  [facility]   Has home health visited the patient within 72 hours of discharge? Yes   What DME was ordered? nebulizer   Has all DME been delivered? Yes   DME comments Pt has a walker that he uses for ambulation   Psychosocial issues? No   Did the patient receive a copy of their discharge instructions? Yes   Nursing interventions Reviewed instructions with patient   What is the patient's perception of their health status since discharge? Same  [weak]   Is the patient/caregiver able to teach back the hierarchy of who to call/visit for symptoms/problems? PCP, Specialist, Home health nurse, Urgent Care, ED, 911 Yes   Week 1 call completed? Yes          DORETHA VILLASENOR - Registered Nurse

## 2022-12-07 NOTE — CONSULTS
GENERAL SURGERY CONSULT    Referring Provider: Florian  Reason for Consultation: Hernia, bowel obstruction    Patient Care Team:  Yusuf Jones MD as PCP - General  Joshua Yip MD as Consulting Physician (Cardiology)  Moise Watson MD as Consulting Physician (Cardiac Electrophysiology)  Izzy Alvarez MD as Consulting Physician (Nephrology)  Celine Swanson MD as Consulting Physician (Cardiology)    Chief complaint abdominal distention, pain    Subjective .     History of present illness: 77-year-old gentleman who I have seen previously for small bowel obstruction potentially related to the large complex ventral hernia.  At that time he appeared to resolve with decompression.  Patient states that he was recently in the hospital for a bleeding ulcer.  He returned to the emergency department today with complaints of abdominal distention.  He notes that he had a bowel movement earlier today.  He reports no vomiting.  He reports that he has had pain across his abdomen as well.    In the emergency department lab work was performed showing a creatinine of 1.5 which is slightly up from his prior lab work.  He was found to have a white count of 12.2 and a hemoglobin of 9.4.  The patient is unclear whether he has been taking his Eliquis at home following his recent hospitalization.    The CT scan was performed that redemonstrated a large complex incisional hernia with an apparent small bowel obstruction without a definitive transition point pinpointed.  There was some question about small bowel pneumatosis as well as question of small amount of air outside of the bowel.  This prompted my consultation.    Review of Systems    Review of Systems   Gastrointestinal: Positive for abdominal distention.         History  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) 10/26/2020   • Benign hypertension with CKD (chronic kidney disease) stage III (HCC)  10/26/2020   • Bruises easily    • CHF (congestive heart failure) ([facility])    • Chronic bronchitis with COPD (chronic obstructive pulmonary disease) ([facility]) 10/26/2020   • Chronic respiratory failure with hypoxia ([facility]) 10/26/2020   • Congenital heart defect    • Difficulty attaining erection    • Heart block    • Hernia of abdominal wall    • Hypertension    • Low back pain    • Pacemaker    • Peripheral vascular disease due to secondary diabetes ([facility]) 10/26/2020   • Poor circulation    • Prostate cancer ([facility])     prostate   • Shortness of breath    • Sleep apnea     using CPAP    • Stage 3a chronic kidney disease ([facility]) 10/26/2020   • Stented coronary artery 12/05/2019    MICHAEL to LAD   • Type 2 diabetes, controlled, with neuropathy ([facility]) 10/26/2020    no meds   • Type 2 diabetes, controlled, with neuropathy ([facility]) 10/26/2020   ,   Past Surgical History:   Procedure Laterality Date   • ABDOMINAL SURGERY     • APPENDECTOMY     • BONE CURETTAGE Right 1/22/2021    Procedure: Wound excision with fifth metatarsal head resection, right foot.;  Surgeon: Josef Egan DPM;  Location: [facility] MAIN OR;  Service: Podiatry;  Laterality: Right;   • BONE INCISION AND DRAINAGE Right 10/5/2020    Procedure: Incision and drainage with debridement of all nonviable soft tissue and bone with bone biopsy, right foot.;  Surgeon: Josef Egan DPM;  Location: [facility] MAIN OR;  Service: Podiatry;  Laterality: Right;   • BRONCHOSCOPY N/A 4/23/2022    Procedure: BRONCHOSCOPY with bronchioalveolar lavage of right lower lobe;  Surgeon: Edwin Kline MD;  Location: [facility] ENDOSCOPY;  Service: Pulmonary;  Laterality: N/A;  pneumonia   • CARDIAC CATHETERIZATION N/A 12/5/2019    Procedure: Left Heart Cath;  Surgeon: Mirza Munson MD;  Location: [facility] CATH INVASIVE LOCATION;  Service: Cardiology   • CARDIAC CATHETERIZATION N/A 12/5/2019    Procedure: Stent MICHAEL coronary;  Surgeon: Mirza Munson MD;  Location: [facility] CATH  INVASIVE LOCATION;  Service: Cardiology   • CARDIAC CATHETERIZATION N/A 10/18/2021    Procedure: Left Heart Cath and coronary angiogram;  Surgeon: Celine Swanson MD;  Location: [facility] CATH INVASIVE LOCATION;  Service: Cardiovascular;  Laterality: N/A;   • CARDIAC CATHETERIZATION N/A 10/18/2021    Procedure: Right Heart Cath;  Surgeon: Celine Swanson MD;  Location: [facility] CATH INVASIVE LOCATION;  Service: Cardiovascular;  Laterality: N/A;   • CHOLECYSTECTOMY     • ELBOW PROCEDURE      left   • ENDOSCOPY N/A 7/6/2022    Procedure: ESOPHAGOGASTRODUODENOSCOPY with biopsy x 1 area. bicap cautery, scelero therapy.;  Surgeon: Douglas Delgadillo MD;  Location: [facility] ENDOSCOPY;  Service: Gastroenterology;  Laterality: N/A;  ESOPHAGITIS, ANTREL ULCER, DUODENAL ULCER   • ENDOSCOPY N/A 11/27/2022    Procedure: ESOPHAGOGASTRODUODENOSCOPY with argon plasma coagulation of small bowel arteriovenous malformation;  Surgeon: Douglas Delgadillo MD;  Location: [facility] ENDOSCOPY;  Service: Gastroenterology;  Laterality: N/A;  post: small bowel AVM   • FOOT SURGERY      right foot   • HARDWARE REMOVAL Left 10/18/2022    Procedure: ELBOW HARDWARE REMOVAL;  Surgeon: Brad Jackson MD;  Location: [facility] MAIN OR;  Service: Orthopedics;  Laterality: Left;   • HERNIA REPAIR     • INCISION AND DRAINAGE OF WOUND Right 4/6/2021    Procedure: INCISION AND DRAINAGE OF RIGHT FOOT 5TH METATARSAL TO BONE AND PARTIAL 5TH METATARSAL RESECTION;  Surgeon: Josef Egan DPM;  Location: [facility] MAIN OR;  Service: Podiatry;  Laterality: Right;   • INCISION AND DRAINAGE OF WOUND Right 4/8/2021    Procedure: INCISION AND DRAINAGE BONE, DELAYED PRIMARY CLOSURE;  Surgeon: Josef Egan DPM;  Location: [facility] MAIN OR;  Service: Podiatry;  Laterality: Right;   • INTERVENTIONAL RADIOLOGY PROCEDURE N/A 12/5/2019    Procedure: Intravascular Ultrasound;  Surgeon: Mirza Munson MD;  Location: [facility] CATH INVASIVE LOCATION;  Service:  Cardiology   • PACEMAKER IMPLANTATION     • DC RT/LT HEART CATHETERS N/A 2019    Procedure: Percutaneous Coronary Intervention;  Surgeon: Mirza Munson MD;  Location: [facility] CATH INVASIVE LOCATION;  Service: Cardiology   • WOUND DEBRIDEMENT Right 10/29/2020    Procedure: Preparation and debridement of foot wound with application of Integra wound graft, right foot.;  Surgeon: Josef Egan DPM;  Location: [facility] MAIN OR;  Service: Podiatry;  Laterality: Right;   • WOUND DEBRIDEMENT N/A 2021    Procedure: EXCISIONAL ABDOMINAL WOUND  DEBRIDEMENT;  Surgeon: Cleopatra Wang MD;  Location: [facility] MAIN OR;  Service: General;  Laterality: N/A;   • WOUND DEBRIDEMENT N/A 2021    Procedure: DEBRIDEMENT OF ABDOMINAL WALL;  Surgeon: Hill Bhatia DO;  Location: [facility] MAIN OR;  Service: General;  Laterality: N/A;   ,   Family History   Problem Relation Age of Onset   • Alzheimer's disease Mother    • GI problems Father    • Heart disease Father    ,   Social History     Tobacco Use   • Smoking status: Former     Years: 15.00     Types: Cigarettes     Start date: 1966     Quit date: 5/10/2020     Years since quittin.5   • Smokeless tobacco: Never   Vaping Use   • Vaping Use: Never used   Substance Use Topics   • Alcohol use: Yes     Alcohol/week: 1.0 standard drink     Types: 1 Shots of liquor per week     Comment: occasionally on holidays   • Drug use: No   , (Not in a hospital admission)  , Scheduled Meds:  lactated ringers, 500 mL, Intravenous, Once  piperacillin-tazobactam, 3.375 g, Intravenous, Q6H    , Continuous Infusions:   , PRN Meds:   and Allergies:  Haloperidol, Morphine, Pantoprazole, and Latex    Objective     Vital Signs   Temp:  [97.9 °F (36.6 °C)] 97.9 °F (36.6 °C)  Heart Rate:  [70-87] 70  Resp:  [18] 18  BP: (114-133)/(58-76) 114/59    Physical Exam:       General Appearance:    Alert, cooperative, in no acute distress   Head:    Normocephalic, without obvious  abnormality, atraumatic   Eyes:            Lids and lashes normal, conjunctivae and sclerae normal, no   icterus, no pallor, corneas clear   Ears:    Ears appear intact with no abnormalities noted   Lungs:     Breathing unlabored   Abdomen:     Large complex ventral hernia which is soft and nontender   Extremities:   Moves all extremities   Neurologic:   Cranial nerves 2 - 12 grossly intact       Results Review:  Lab Results (last 72 hours)     Procedure Component Value Units Date/Time    [place] Draw [[id number]] Collected: 12/07/22 1350    Specimen: Blood Updated: 12/07/22 1501    Narrative:      The following orders were created for panel order [place] Draw.  Procedure                               Abnormality         Status                     ---------                               -----------         ------                     Green Top (Gel)[[id number]]                                  Final result               Lavender Top[[id number]]                                     Final result               Gold Top - SST[[id number]]                                   Final result               Light Blue Top[[id number]]                                   Final result                 Please view results for these tests on the individual orders.    Lavender Top [[id number]] Collected: 12/07/22 1350    Specimen: Blood Updated: 12/07/22 1501     Extra Tube hold for add-on     Comment: Auto resulted       Light Blue Top [[id number]] Collected: 12/07/22 1350    Specimen: Blood Updated: 12/07/22 1501     Extra Tube Hold for add-ons.     Comment: Auto resulted       Green Top (Gel) [[id number]] Collected: 12/07/22 1350    Specimen: Blood Updated: 12/07/22 1501     Extra Tube Hold for add-ons.     Comment: Auto resulted.       Gold Top - SST [[id number]] Collected: 12/07/22 1350    Specimen: Blood Updated: 12/07/22 1501     Extra Tube Hold for add-ons.     Comment: Auto resulted.       COVID-19,CEPHEID/BRITTANI,COR/SMOOTH/PAD/SARAI/MAD  IN-HOUSE(OR EMERGENT/ADD-ON),NP SWAB IN TRANSPORT MEDIA 3-4 HR TAT, RT-PCR - Swab, Nasopharynx [[id number]]  (Normal) Collected: 12/07/22 1408    Specimen: Swab from Nasopharynx Updated: 12/07/22 1455     COVID19 Not Detected    Narrative:      Fact sheet for providers: https://www.fda.gov/media/081854/download     Fact sheet for patients: https://www.fda.gov/media/952696/download  Fact sheet for providers: https://www.fda.gov/media/076093/download     Fact sheet for patients: https://www.fda.gov/media/850701/download    Comprehensive Metabolic Panel [[id number]]  (Abnormal) Collected: 12/07/22 1350    Specimen: Blood Updated: 12/07/22 1434     Glucose 144 mg/dL      BUN 30 mg/dL      Creatinine 1.53 mg/dL      Sodium 136 mmol/L      Potassium 4.3 mmol/L      Chloride 94 mmol/L      CO2 33.0 mmol/L      Calcium 9.1 mg/dL      Total Protein 6.8 g/dL      Albumin 3.60 g/dL      ALT (SGPT) 6 U/L      AST (SGOT) 14 U/L      Alkaline Phosphatase 121 U/L      Total Bilirubin 0.6 mg/dL      Globulin 3.2 gm/dL      A/G Ratio 1.1 g/dL      BUN/Creatinine Ratio 19.6     Anion Gap 9.0 mmol/L      eGFR 46.8 mL/min/1.73      Comment: National Kidney Foundation and American Society of Nephrology (ASN) Task Force recommended calculation based on the Chronic Kidney Disease Epidemiology Collaboration (CKD-EPI) equation refit without adjustment for race.       Narrative:      GFR Normal >60  Chronic Kidney Disease <60  Kidney Failure <15    The GFR formula is only valid for adults with stable renal function between ages 18 and 70.    Lipase [[id number]]  (Normal) Collected: 12/07/22 1350    Specimen: Blood Updated: 12/07/22 1423     Lipase 48 U/L     Protime-INR [[id number]]  (Normal) Collected: 12/07/22 1350    Specimen: Blood Updated: 12/07/22 1414     Protime 11.2 Seconds      INR 1.09    CBC & Differential [[id number]]  (Abnormal) Collected: 12/07/22 1350    Specimen: Blood Updated: 12/07/22 1408    Narrative:      The following  orders were created for panel order CBC & Differential.  Procedure                               Abnormality         Status                     ---------                               -----------         ------                     CBC Auto Differential[[id number]]        Abnormal            Final result                 Please view results for these tests on the individual orders.    CBC Auto Differential [[id number]]  (Abnormal) Collected: 12/07/22 1350    Specimen: Blood Updated: 12/07/22 1404     WBC 12.20 10*3/mm3      RBC 3.66 10*6/mm3      Hemoglobin 9.4 g/dL      Hematocrit 30.8 %      MCV 84.2 fL      MCH 25.6 pg      MCHC 30.4 g/dL      RDW 21.5 %      RDW-SD 63.4 fl      MPV 7.7 fL      Platelets 497 10*3/mm3      Neutrophil % 84.9 %      Lymphocyte % 5.6 %      Monocyte % 4.6 %      Eosinophil % 3.8 %      Basophil % 1.1 %      Neutrophils, Absolute 10.40 10*3/mm3      Lymphocytes, Absolute 0.70 10*3/mm3      Monocytes, Absolute 0.60 10*3/mm3      Eosinophils, Absolute 0.50 10*3/mm3      Basophils, Absolute 0.10 10*3/mm3      nRBC 0.0 /100 WBC         Imaging Results (Last 72 Hours)     Procedure Component Value Units Date/Time    CT Abdomen Pelvis Without Contrast [[id number]] Collected: 12/07/22 1602     Updated: 12/07/22 1624    Narrative:      CT ABDOMEN PELVIS WO CONTRAST-     Date of Exam: 12/7/2022 3:47 PM     Indication: distension, hx SOB, UGIB     Comparison: Several prior exams dating back to 2020.     Technique: Contiguous axial CT images were obtained from the lung bases  to the pubic symphysis without contrast. Sagittal and coronal  reconstructions were performed.  Automated exposure control and  iterative reconstruction methods were used.     FINDINGS:  Limited views of the lung bases demonstrates a trace right effusion and  small left effusion.     The liver is unremarkable. No intra or extrahepatic biliary duct  dilation is identified. Status post cholecystectomy. The pancreas  is  unremarkable. The spleen is normal.     Bilateral adrenal glands are normal. There is a staghorn calculus  filling the left mid and lower pole collecting system. No evidence of  obstruction is identified. There is a low-density right renal lesion  measuring 2.6 x 1.7 cm which is likely an irregular cyst. The bladder is  unremarkable. Status post prostatectomy.     The colon is unremarkable.     There is a small amount of pneumoperitoneum. There is marked stranding  within the mesentery as well as scattered areas of adjacent loops of  small bowel. There are markedly dilated loops of proximal mid and distal  small bowel with a transition zone that is difficult to determine, but  appears to be in the large complex ventral hernia. The transition zone  appears to be in the region of the axial image #165.     No adenopathy. No aggressive appearing lytic or sclerotic bone lesions.  Mild aneurysmal dilation of the infrarenal abdominal aorta. No  aggressive appearing lytic or sclerotic bone lesions are identified..          Impression:         1. High-grade small bowel obstruction with a transition point in the  large ventral hernia in the region of the distal small bowel.  2. There is a small amount of pneumoperitoneum as well as pneumatosis in  the small bowel in the right upper quadrant adjacent to prior suture  line. This is best seen on axial image #102. There has been pneumatosis  seen in these loops of small bowel on prior exams.  3. There is also significant stranding in the mesentery as well as fluid  adjacent to these loops of small bowel. This is a new finding compared  to the prior exams.  4. Together these findings are concerning for threatened loops of small  bowel and recommend surgical consultation for further workup and  management.           Electronically Signed By-Jc Camara MD On:12/7/2022 4:22 PM  This report was finalized on [id number] by  Jc Camara MD.          I reviewed the patient's  "new clinical results.  I reviewed the patient's new imaging results and agree with the interpretation.  I reviewed the patient's other test results and agree with the interpretation      Assessment & Plan       * No active hospital problems. *      77-year-old gentleman with multiple medical problems, loss of abdominal wall domain with large complex ventral hernia with recurring small bowel obstructions    I discussed with the patient the current findings.  He is aware that he has had multiple obstructions previously.  He is also aware that he is a very poor surgical candidate both due to the nature of his abdomen and abdominal wall as well as his multiple medical comorbidities.  He also does not appear to be agreeable to surgery and states that he believes his days of surgery are \"done.\"  Certainly I would not be willing to take him to the operating room except in the most extreme circumstances as I suspect that at the very least he would have an exceptionally difficult recovery from surgery and certainly would be at high risk for significant potentially life-threatening complications.  Recommend nasogastric tube decompression.  We will continue to follow him.  Hold blood thinners.    I discussed the patient's findings and my recommendations with patient, nursing staff and consulting provider              This document has been electronically signed by Agustin Ricks MD on December 7, 2022 17:24 EST      Agustin Ricks MD  12/07/22  17:24 EST            "

## 2022-12-07 NOTE — ED PROVIDER NOTES
Subjective   History of Present Illness  77-year-old male complaining of increased abdominal pain and distention today.  He states he is worried he has had a bowel obstruction.  The patient states he took 20 mg of oxycodone when he got up this morning took an additional 20 mg later on this afternoon.  He reports that he has not had definite fever or chills.  He states he always has some cough.  He reports no hemoptysis.  He denies melena hematemesis or hematochezia.  He denies dysuria or hematuria.  He states that he needs a lot of pain medicine        Review of Systems   Constitutional: Positive for fatigue. Negative for chills and fever.   HENT: Negative for sore throat.    Eyes: Negative for discharge.   Respiratory: Negative for chest tightness and shortness of breath.    Cardiovascular: Negative for chest pain, palpitations and leg swelling.   Gastrointestinal: Positive for abdominal distention, nausea and vomiting. Negative for diarrhea.   Endocrine: Positive for polydipsia, polyphagia and polyuria.   Genitourinary: Negative for flank pain.   Musculoskeletal: Positive for arthralgias, back pain and neck pain. Negative for joint swelling and neck stiffness.   Skin: Positive for wound.   Neurological: Negative for facial asymmetry and light-headedness.   Hematological: Does not bruise/bleed easily.   All other systems reviewed and are negative.      Past Medical History:   Diagnosis Date   • Alcoholic cirrhosis of liver with ascites ([facility]) 10/26/2020   • Benign hypertension with CKD (chronic kidney disease) stage III ([facility]) 10/26/2020   • Bruises easily    • CHF (congestive heart failure) ([facility])    • Chronic bronchitis with COPD (chronic obstructive pulmonary disease) ([facility]) 10/26/2020   • Chronic respiratory failure with hypoxia ([facility]) 10/26/2020   • Congenital heart defect    • Difficulty attaining erection    • Heart block    • Hernia of abdominal wall    • Hypertension    • Low back pain    • Pacemaker    •  Peripheral vascular disease due to secondary diabetes ([facility]) 10/26/2020   • Poor circulation    • Prostate cancer ([facility])     prostate   • Shortness of breath    • Sleep apnea     using CPAP    • Stage 3a chronic kidney disease ([facility]) 10/26/2020   • Stented coronary artery 12/05/2019    MICHAEL to LAD   • Type 2 diabetes, controlled, with neuropathy ([facility]) 10/26/2020    no meds   • Type 2 diabetes, controlled, with neuropathy ([facility]) 10/26/2020       Allergies   Allergen Reactions   • Haloperidol Hives   • Morphine Itching   • Pantoprazole Other (See Comments)     Feels sick after receiving   • Latex Unknown - High Severity       Past Surgical History:   Procedure Laterality Date   • ABDOMINAL SURGERY     • APPENDECTOMY     • BONE CURETTAGE Right 1/22/2021    Procedure: Wound excision with fifth metatarsal head resection, right foot.;  Surgeon: Josef Egan DPM;  Location: [facility] MAIN OR;  Service: Podiatry;  Laterality: Right;   • BONE INCISION AND DRAINAGE Right 10/5/2020    Procedure: Incision and drainage with debridement of all nonviable soft tissue and bone with bone biopsy, right foot.;  Surgeon: Josef Egan DPM;  Location: [facility] MAIN OR;  Service: Podiatry;  Laterality: Right;   • BRONCHOSCOPY N/A 4/23/2022    Procedure: BRONCHOSCOPY with bronchioalveolar lavage of right lower lobe;  Surgeon: Edwin Kline MD;  Location: [facility] ENDOSCOPY;  Service: Pulmonary;  Laterality: N/A;  pneumonia   • CARDIAC CATHETERIZATION N/A 12/5/2019    Procedure: Left Heart Cath;  Surgeon: Mirza Munson MD;  Location: [facility] CATH INVASIVE LOCATION;  Service: Cardiology   • CARDIAC CATHETERIZATION N/A 12/5/2019    Procedure: Stent MICHAEL coronary;  Surgeon: Mirza Munson MD;  Location: [facility] CATH INVASIVE LOCATION;  Service: Cardiology   • CARDIAC CATHETERIZATION N/A 10/18/2021    Procedure: Left Heart Cath and coronary angiogram;  Surgeon: Celine Swanson MD;  Location: [facility] CATH INVASIVE LOCATION;   Service: Cardiovascular;  Laterality: N/A;   • CARDIAC CATHETERIZATION N/A 10/18/2021    Procedure: Right Heart Cath;  Surgeon: Celine Swanson MD;  Location: [facility] CATH INVASIVE LOCATION;  Service: Cardiovascular;  Laterality: N/A;   • CHOLECYSTECTOMY     • ELBOW PROCEDURE      left   • ENDOSCOPY N/A 7/6/2022    Procedure: ESOPHAGOGASTRODUODENOSCOPY with biopsy x 1 area. bicap cautery, scelero therapy.;  Surgeon: Douglas Delgadillo MD;  Location: [facility] ENDOSCOPY;  Service: Gastroenterology;  Laterality: N/A;  ESOPHAGITIS, ANTREL ULCER, DUODENAL ULCER   • ENDOSCOPY N/A 11/27/2022    Procedure: ESOPHAGOGASTRODUODENOSCOPY with argon plasma coagulation of small bowel arteriovenous malformation;  Surgeon: Douglas Delgadillo MD;  Location: [facility] ENDOSCOPY;  Service: Gastroenterology;  Laterality: N/A;  post: small bowel AVM   • FOOT SURGERY      right foot   • HARDWARE REMOVAL Left 10/18/2022    Procedure: ELBOW HARDWARE REMOVAL;  Surgeon: Brad Jackson MD;  Location: [facility] MAIN OR;  Service: Orthopedics;  Laterality: Left;   • HERNIA REPAIR     • INCISION AND DRAINAGE OF WOUND Right 4/6/2021    Procedure: INCISION AND DRAINAGE OF RIGHT FOOT 5TH METATARSAL TO BONE AND PARTIAL 5TH METATARSAL RESECTION;  Surgeon: Josef Egan DPM;  Location: [facility] MAIN OR;  Service: Podiatry;  Laterality: Right;   • INCISION AND DRAINAGE OF WOUND Right 4/8/2021    Procedure: INCISION AND DRAINAGE BONE, DELAYED PRIMARY CLOSURE;  Surgeon: Josef Egan DPM;  Location: [facility] MAIN OR;  Service: Podiatry;  Laterality: Right;   • INTERVENTIONAL RADIOLOGY PROCEDURE N/A 12/5/2019    Procedure: Intravascular Ultrasound;  Surgeon: Mirza Munson MD;  Location: [facility] CATH INVASIVE LOCATION;  Service: Cardiology   • PACEMAKER IMPLANTATION     • WI RT/LT HEART CATHETERS N/A 12/5/2019    Procedure: Percutaneous Coronary Intervention;  Surgeon: Mirza Munson MD;  Location: [facility] CATH INVASIVE LOCATION;   Service: Cardiology   • WOUND DEBRIDEMENT Right 10/29/2020    Procedure: Preparation and debridement of foot wound with application of Integra wound graft, right foot.;  Surgeon: Josef Egan DPM;  Location: [facility] MAIN OR;  Service: Podiatry;  Laterality: Right;   • WOUND DEBRIDEMENT N/A 2021    Procedure: EXCISIONAL ABDOMINAL WOUND  DEBRIDEMENT;  Surgeon: Cleopatra Wang MD;  Location: [facility] MAIN OR;  Service: General;  Laterality: N/A;   • WOUND DEBRIDEMENT N/A 2021    Procedure: DEBRIDEMENT OF ABDOMINAL WALL;  Surgeon: Hill Bhatia DO;  Location: [facility] MAIN OR;  Service: General;  Laterality: N/A;       Family History   Problem Relation Age of Onset   • Alzheimer's disease Mother    • GI problems Father    • Heart disease Father        Social History     Socioeconomic History   • Marital status:    Tobacco Use   • Smoking status: Former     Years: 15.00     Types: Cigarettes     Start date: 1966     Quit date: 5/10/2020     Years since quittin.5   • Smokeless tobacco: Never   Vaping Use   • Vaping Use: Never used   Substance and Sexual Activity   • Alcohol use: Yes     Alcohol/week: 1.0 standard drink     Types: 1 Shots of liquor per week     Comment: occasionally on holidays   • Drug use: No   • Sexual activity: Defer           Objective   Physical Exam  Alert [place] Coma Scale 15 poor historian, states his primary concern is getting something for pain   HEENT: Pupils equal and reactive to light. Conjunctivae are not injected. Normal tympanic membranes. Oropharynx and nares are normal.   Neck: Supple. Midline trachea. No JVD. No goiter.   Chest: Clear and equal breath sounds bilaterally, regular rate and rhythm without murmur or rub.   Abdomen: Decreased bowel sounds overall.  The abdomen appears clinically distended.  The patient has lot of periumbilical negative Valencia sign or referred pain discomfort.  Is worse on the right compared to the left side, nondistended. No rebound  or peritoneal signs. No CVA tenderness.   Extremities no clubbing. cyanosis or edema. Motor sensory exam is normal. The full range of motion is intact   Skin: Warm and dry, no rashes or petechia.   Lymphatic: No regional lymphadenopathy. No calf pain, swelling or Homans sign    Procedures           ED Course           Labs Reviewed   COMPREHENSIVE METABOLIC PANEL - Abnormal; Notable for the following components:       Result Value    Glucose 144 (*)     BUN 30 (*)     Creatinine 1.53 (*)     Chloride 94 (*)     CO2 33.0 (*)     Alkaline Phosphatase 121 (*)     eGFR 46.8 (*)     All other components within normal limits    Narrative:     GFR Normal >60  Chronic Kidney Disease <60  Kidney Failure <15    The GFR formula is only valid for adults with stable renal function between ages 18 and 70.   CBC WITH AUTO DIFFERENTIAL - Abnormal; Notable for the following components:    WBC 12.20 (*)     RBC 3.66 (*)     Hemoglobin 9.4 (*)     Hematocrit 30.8 (*)     MCH 25.6 (*)     MCHC 30.4 (*)     RDW 21.5 (*)     RDW-SD 63.4 (*)     Platelets 497 (*)     Neutrophil % 84.9 (*)     Lymphocyte % 5.6 (*)     Monocyte % 4.6 (*)     Neutrophils, Absolute 10.40 (*)     Eosinophils, Absolute 0.50 (*)     All other components within normal limits   COVID-19,CEPHEID/BRITTANI,COR/SMOOTH/PAD/SARAI/MAD IN-HOUSE,NP SWAB IN TRANSPORT MEDIA 3-4 HR TAT, RT-PCR - Normal    Narrative:     Fact sheet for providers: https://www.fda.gov/media/152236/download     Fact sheet for patients: https://www.fda.gov/media/189772/download  Fact sheet for providers: https://www.fda.gov/media/666935/download     Fact sheet for patients: https://www.fda.gov/media/035966/download   PROTIME-INR - Normal   LIPASE - Normal   RAINBOW DRAW    Narrative:     The following orders were created for panel order [place] Draw.  Procedure                               Abnormality         Status                     ---------                               -----------         ------                      Green Top (Gel)[[id number]]                                  Final result               Lavender Top[[id number]]                                     Final result               Gold Top - SST[[id number]]                                   Final result               Light Blue Top[[id number]]                                   Final result                 Please view results for these tests on the individual orders.   TYPE AND SCREEN   BB ARMBAND CHECK   GREEN TOP   LAVENDER TOP   GOLD TOP - SST   LIGHT BLUE TOP   CBC AND DIFFERENTIAL    Narrative:     The following orders were created for panel order CBC & Differential.  Procedure                               Abnormality         Status                     ---------                               -----------         ------                     CBC Auto Differential[[id number]]        Abnormal            Final result                 Please view results for these tests on the individual orders.     Medications   lactated ringers bolus 500 mL (has no administration in time range)   HYDROmorphone (DILAUDID) injection 0.5 mg (has no administration in time range)   piperacillin-tazobactam (ZOSYN) IVPB 3.375 g in 100 mL NS (CD) (has no administration in time range)   sodium chloride 0.9 % bolus 500 mL (0 mL Intravenous Stopped 12/7/22 1546)   famotidine (PEPCID) injection 20 mg (20 mg Intravenous Given 12/7/22 1406)   ondansetron (ZOFRAN) injection 8 mg (8 mg Intravenous Given 12/7/22 1406)   HYDROmorphone (DILAUDID) injection 0.5 mg (0.5 mg Intravenous Given 12/7/22 1406)     CT Abdomen Pelvis Without Contrast    Result Date: 12/7/2022   1. High-grade small bowel obstruction with a transition point in the large ventral hernia in the region of the distal small bowel. 2. There is a small amount of pneumoperitoneum as well as pneumatosis in the small bowel in the right upper quadrant adjacent to prior suture line. This is best seen on axial image #102. There has been  pneumatosis seen in these loops of small bowel on prior exams. 3. There is also significant stranding in the mesentery as well as fluid adjacent to these loops of small bowel. This is a new finding compared to the prior exams. 4. Together these findings are concerning for threatened loops of small bowel and recommend surgical consultation for further workup and management.    Electronically Signed By-Jc Camara MD On:12/7/2022 4:22 PM This report was finalized on [id number] by  Jc Camara MD.                                    MDM  Number of Diagnoses or Management Options     Amount and/or Complexity of Data Reviewed  Clinical lab tests: ordered and reviewed  Tests in the radiology section of CPT®: reviewed and ordered  Discuss the patient with other providers: yes  Independent visualization of images, tracings, or specimens: yes    Risk of Complications, Morbidity, and/or Mortality  Presenting problems: high  Diagnostic procedures: high  Management options: high  General comments: Etiology of the small pneumoperitoneum is difficult to elucidate based on these images the possibility of biliary dilation also exists the patient has clear high-grade bowel obstruction.  The patient's pain and nausea were controlled emergency department patient had NG tube placed the case was discussed the primary care provider service as well as the surgeon.  The patient was placed on IV Unasyn.  He was fluid resuscitated while in the emergency department.  The patient was agreeable to this plan of treatment but stated he wanted to make sure we knew he needed a lot of pain medicine    's note that 33 minutes were spent with care and stabilization this patient for critical care time exclusive of any procedures performed    Final diagnoses:   Small bowel obstruction (HCC)   Ventral hernia without obstruction or gangrene   Pneumoperitoneum   Generalized abdominal pain       ED Disposition  ED Disposition     ED Disposition    Decision to Admit    Condition   --    Comment   --             No follow-up provider specified.       Medication List      No changes were made to your prescriptions during this visit.          Rodolfo Du MD  12/07/22 0313

## 2022-12-08 PROBLEM — K56.609 SBO (SMALL BOWEL OBSTRUCTION) (HCC): Status: ACTIVE | Noted: 2022-01-01

## 2022-12-08 PROBLEM — K66.8 PNEUMOPERITONEUM: Status: ACTIVE | Noted: 2022-01-01

## 2022-12-08 PROBLEM — G62.1 ALCOHOLIC POLYNEUROPATHY: Status: ACTIVE | Noted: 2022-01-01

## 2022-12-08 PROBLEM — K43.9 VENTRAL HERNIA WITHOUT OBSTRUCTION OR GANGRENE: Status: ACTIVE | Noted: 2020-10-25

## 2022-12-08 PROBLEM — K56.609 SMALL BOWEL OBSTRUCTION (HCC): Status: ACTIVE | Noted: 2022-01-01

## 2022-12-08 PROBLEM — F10.10 ALCOHOL ABUSE, UNCOMPLICATED: Status: ACTIVE | Noted: 2022-01-01

## 2022-12-08 NOTE — CASE MANAGEMENT/SOCIAL WORK
Discharge Planning Assessment   [facility]     Patient Name: Ro Nathan  MRN: [id number]  Today's Date: 12/8/2022    Admit Date: 12/7/2022    Plan: D/C Plan: Home with family, current Home O2 (doent remember provider)   Discharge Needs Assessment     Row Name 12/08/22 1431       Living Environment    People in Home spouse    Name(s) of People in Home Spouse-Meghan    Current Living Arrangements home    Primary Care Provided by self    Provides Primary Care For no one, unable/limited ability to care for self    Family Caregiver if Needed spouse    Family Caregiver Names Meghan-Spouse and VIET Lucas    Quality of Family Relationships supportive    Able to Return to Prior Arrangements yes       Resource/Environmental Concerns    Resource/Environmental Concerns none       Transition Planning    Patient/Family Anticipates Transition to home with family    Patient/Family Anticipated Services at Transition     Transportation Anticipated family or friend will provide       Discharge Needs Assessment    Equipment Currently Used at Home oxygen;walker, standard    Concerns to be Addressed denies needs/concerns at this time    Equipment Needed After Discharge none    Provided Post Acute Provider List? N/A    Provided Post Acute Provider Quality & Resource List? N/A               Discharge Plan     Row Name 12/08/22 1432       Plan    Plan D/C Plan: Home with family, current Home O2 (doent remember provider)    Plan Comments Spoke with pt at bedside, confirmed PCP and Pharmacy. States he has Home O2 but does not remember what company provided it. Pt lives with spouse, Meghan and VIET Lucas who assist with ADL's as needed and will provide transporation home at [place]. Denies futher needs at this time.              Continued Care and Services - Admitted Since 12/7/2022    Coordination has not been started for this encounter.     Selected Continued Care - Prior Encounters Includes continued care and service providers with  selected services from prior encounters from 9/8/2022 to 12/8/2022    Discharged on 11/30/2022 Admission date: 11/26/2022 - Discharge disposition: Home or Self Care    Home Medical Care     Service Provider Selected Services Address Phone Fax Patient Preferred    [facility] [address] [address] [phone] --                Discharged on 10/20/2022 Admission date: 10/11/2022 - Discharge disposition: Home-Health Care [facility]    Dialysis/Infusion     Service Provider Selected Services Address Phone Fax Patient Preferred    [facility] [address] [address] [address] [phone] --          Home Medical Care     Service Provider Selected Services Address Phone Fax Patient Preferred    [facility] [address] [address] [phone] --                Discharged on 10/1/2022 Admission date: 9/27/2022 - Discharge disposition: Home-Health Care [facility]    Home Medical Care     Service Provider Selected Services Address Phone Fax Patient Preferred    [facility] [address] [address] [phone] --                       Demographic Summary     Row Name 12/08/22 1430       General Information    Admission Type inpatient    Arrived From emergency department    Referral Source emergency department    Reason for Consult discharge planning    Preferred Language English       Contact Information    Contact Information Obtained for                Functional Status     Row Name 12/08/22 1430       Functional Status    Usual Activity Tolerance fair    Current Activity Tolerance poor       Functional Status, IADL    Medications independent    Meal Preparation assistive person    Housekeeping assistive person    Laundry assistive person     Shopping assistive person    IADL Comments Pt states that his spouse and VIET assist with ADL's as needed.       Mental Status    General Appearance WDL WDL              Met with patient in room wearing PPE: mask    Maintained distance greater than six feet and spent less than 15 minutes in the room    Ijeoma Galindo RN    Phone [phone]  Fax [phone]

## 2022-12-08 NOTE — H&P
78 y/o with massive inoperable ventral hernia with multiple comorbidities presented with acute high-grade SBO.  His presentation remains quite tenuous secondary to some possibility of rupture. Plans per GSX.  NG decompression to continue with careful pain control given hepatic insufficiency.    He was seen and evaluated by myself today    Patient Care Team:  Yusuf Jones MD as PCP - General  PhiJoshua MD as Consulting Physician (Cardiology)  Moise Watson MD as Consulting Physician (Cardiac Electrophysiology)  Izzy Alvarez MD as Consulting Physician (Nephrology)  Celine Swanson MD as Consulting Physician (Cardiology)    Chief complaint abdominal pain    Subjective     Patient is a 77 y.o. male who presents with increasing abdominal pain, distention, and nausea since yesterday. Patient reports history of multiple bowel obstructions, and this feels similar to those. Denies any fever chills, vomiting, diarrhea, hematochezia, or hematemesis. Denies any urinary complaints.     Onset of symptoms was 1 day    Review of Systems   Constitutional: Positive for fatigue.   HENT: Negative.    Eyes: Negative.    Respiratory: Negative.    Cardiovascular: Negative.    Gastrointestinal: Positive for abdominal distention, abdominal pain and nausea.   Endocrine: Negative.    Genitourinary: Negative.    Musculoskeletal: Negative.    Skin: Positive for wound.   Neurological: Negative.    Psychiatric/Behavioral: Negative.           History  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) 10/26/2020   • Benign hypertension with CKD (chronic kidney disease) stage III (HCC) 10/26/2020   • Bruises easily    • CHF (congestive heart failure) (HCC)    • Chronic bronchitis with COPD (chronic obstructive pulmonary disease) (HCC) 10/26/2020   • Chronic respiratory failure with hypoxia (HCC) 10/26/2020   • Congenital heart defect    • Difficulty attaining  erection    • Heart block    • Hernia of abdominal wall    • Hypertension    • Low back pain    • Pacemaker    • Peripheral vascular disease due to secondary diabetes ([facility]) 10/26/2020   • Poor circulation    • Prostate cancer ([facility])     prostate   • Shortness of breath    • Sleep apnea     using CPAP    • Stage 3a chronic kidney disease ([facility]) 10/26/2020   • Stented coronary artery 12/05/2019    MICHAEL to LAD   • Type 2 diabetes, controlled, with neuropathy ([facility]) 10/26/2020    no meds   • Type 2 diabetes, controlled, with neuropathy ([facility]) 10/26/2020     Past Surgical History:   Procedure Laterality Date   • ABDOMINAL SURGERY     • APPENDECTOMY     • BONE CURETTAGE Right 1/22/2021    Procedure: Wound excision with fifth metatarsal head resection, right foot.;  Surgeon: Josef Egan DPM;  Location: [facility] MAIN OR;  Service: Podiatry;  Laterality: Right;   • BONE INCISION AND DRAINAGE Right 10/5/2020    Procedure: Incision and drainage with debridement of all nonviable soft tissue and bone with bone biopsy, right foot.;  Surgeon: Josef Egan DPM;  Location: [facility] MAIN OR;  Service: Podiatry;  Laterality: Right;   • BRONCHOSCOPY N/A 4/23/2022    Procedure: BRONCHOSCOPY with bronchioalveolar lavage of right lower lobe;  Surgeon: Edwin Kline MD;  Location: [facility] ENDOSCOPY;  Service: Pulmonary;  Laterality: N/A;  pneumonia   • CARDIAC CATHETERIZATION N/A 12/5/2019    Procedure: Left Heart Cath;  Surgeon: Mirza Munson MD;  Location: [facility] CATH INVASIVE LOCATION;  Service: Cardiology   • CARDIAC CATHETERIZATION N/A 12/5/2019    Procedure: Stent MICHAEL coronary;  Surgeon: Mirza Munson MD;  Location: [facility] CATH INVASIVE LOCATION;  Service: Cardiology   • CARDIAC CATHETERIZATION N/A 10/18/2021    Procedure: Left Heart Cath and coronary angiogram;  Surgeon: Celine Swanson MD;  Location: [facility] CATH INVASIVE LOCATION;  Service: Cardiovascular;  Laterality: N/A;   • CARDIAC CATHETERIZATION  N/A 10/18/2021    Procedure: Right Heart Cath;  Surgeon: Celine Swanson MD;  Location: [facility] CATH INVASIVE LOCATION;  Service: Cardiovascular;  Laterality: N/A;   • CHOLECYSTECTOMY     • ELBOW PROCEDURE      left   • ENDOSCOPY N/A 7/6/2022    Procedure: ESOPHAGOGASTRODUODENOSCOPY with biopsy x 1 area. bicap cautery, scelero therapy.;  Surgeon: Douglas Delgadillo MD;  Location: [facility] ENDOSCOPY;  Service: Gastroenterology;  Laterality: N/A;  ESOPHAGITIS, ANTREL ULCER, DUODENAL ULCER   • ENDOSCOPY N/A 11/27/2022    Procedure: ESOPHAGOGASTRODUODENOSCOPY with argon plasma coagulation of small bowel arteriovenous malformation;  Surgeon: Douglas Delgadillo MD;  Location: [facility] ENDOSCOPY;  Service: Gastroenterology;  Laterality: N/A;  post: small bowel AVM   • FOOT SURGERY      right foot   • HARDWARE REMOVAL Left 10/18/2022    Procedure: ELBOW HARDWARE REMOVAL;  Surgeon: Brad Jackson MD;  Location: [facility] MAIN OR;  Service: Orthopedics;  Laterality: Left;   • HERNIA REPAIR     • INCISION AND DRAINAGE OF WOUND Right 4/6/2021    Procedure: INCISION AND DRAINAGE OF RIGHT FOOT 5TH METATARSAL TO BONE AND PARTIAL 5TH METATARSAL RESECTION;  Surgeon: Josef Egan DPM;  Location: [facility] MAIN OR;  Service: Podiatry;  Laterality: Right;   • INCISION AND DRAINAGE OF WOUND Right 4/8/2021    Procedure: INCISION AND DRAINAGE BONE, DELAYED PRIMARY CLOSURE;  Surgeon: Josef Egan DPM;  Location: [facility] MAIN OR;  Service: Podiatry;  Laterality: Right;   • INTERVENTIONAL RADIOLOGY PROCEDURE N/A 12/5/2019    Procedure: Intravascular Ultrasound;  Surgeon: Mirza Munson MD;  Location: [facility] CATH INVASIVE LOCATION;  Service: Cardiology   • PACEMAKER IMPLANTATION     • CO RT/LT HEART CATHETERS N/A 12/5/2019    Procedure: Percutaneous Coronary Intervention;  Surgeon: Mirza Munson MD;  Location: [facility] CATH INVASIVE LOCATION;  Service: Cardiology   • WOUND DEBRIDEMENT Right 10/29/2020     Procedure: Preparation and debridement of foot wound with application of Integra wound graft, right foot.;  Surgeon: Josef Egan DPM;  Location: [facility] MAIN OR;  Service: Podiatry;  Laterality: Right;   • WOUND DEBRIDEMENT N/A 2021    Procedure: EXCISIONAL ABDOMINAL WOUND  DEBRIDEMENT;  Surgeon: Cleopatra Wang MD;  Location: [facility] MAIN OR;  Service: General;  Laterality: N/A;   • WOUND DEBRIDEMENT N/A 2021    Procedure: DEBRIDEMENT OF ABDOMINAL WALL;  Surgeon: Hill Bhatia DO;  Location: [facility] MAIN OR;  Service: General;  Laterality: N/A;     Family History   Problem Relation Age of Onset   • Alzheimer's disease Mother    • GI problems Father    • Heart disease Father      Social History     Tobacco Use   • Smoking status: Former     Years: 15.00     Types: Cigarettes     Start date: 1966     Quit date: 5/10/2020     Years since quittin.5   • Smokeless tobacco: Never   Vaping Use   • Vaping Use: Never used   Substance Use Topics   • Alcohol use: Not Currently   • Drug use: No     (Not in a hospital admission)    Allergies:  Haloperidol, Morphine, Pantoprazole, and Latex    Objective     Vital Signs  Temp:  [97.9 °F (36.6 °C)] 97.9 °F (36.6 °C)  Heart Rate:  [70-87] 70  Resp:  [18] 18  BP: ()/(38-76) 100/55     Physical Exam:      General Appearance:    Alert, cooperative, in no acute distress   Head:    Normocephalic, without obvious abnormality, atraumatic   Eyes:            Lids and lashes normal, conjunctivae and sclerae normal, no   icterus, no pallor, corneas clear, PERRLA   Ears:    Ears appear intact with no abnormalities noted   Throat:   No oral lesions, no thrush, oral mucosa moist   Neck:   No adenopathy, supple, trachea midline, no thyromegaly, no   carotid bruit, no JVD   Lungs:     Clear to auscultation,respirations regular, even and                  unlabored    Heart:    Regular rhythm and normal rate, normal S1 and S2, no            murmur, no gallop, no rub, no  click   Chest Wall:    No abnormalities observed   Abdomen:     decreased bowel sounds, no masses, no organomegaly, generalized tenderness, distended, no guarding, no rebound tenderness   Extremities:   Moves all extremities well, no edema, no cyanosis, no             redness   Pulses:   Pulses palpable and equal bilaterally   Skin:   No bleeding, bruising or rash   Lymph nodes:   No palpable adenopathy   Neurologic:   No focal deficits noted       Results Review:     Imaging Results (Last 24 Hours)     Procedure Component Value Units Date/Time    CT Abdomen Pelvis Without Contrast [[id number]] Collected: 12/07/22 1602     Updated: 12/07/22 1624    Narrative:      CT ABDOMEN PELVIS WO CONTRAST-     Date of Exam: 12/7/2022 3:47 PM     Indication: distension, hx SOB, UGIB     Comparison: Several prior exams dating back to 2020.     Technique: Contiguous axial CT images were obtained from the lung bases  to the pubic symphysis without contrast. Sagittal and coronal  reconstructions were performed.  Automated exposure control and  iterative reconstruction methods were used.     FINDINGS:  Limited views of the lung bases demonstrates a trace right effusion and  small left effusion.     The liver is unremarkable. No intra or extrahepatic biliary duct  dilation is identified. Status post cholecystectomy. The pancreas is  unremarkable. The spleen is normal.     Bilateral adrenal glands are normal. There is a staghorn calculus  filling the left mid and lower pole collecting system. No evidence of  obstruction is identified. There is a low-density right renal lesion  measuring 2.6 x 1.7 cm which is likely an irregular cyst. The bladder is  unremarkable. Status post prostatectomy.     The colon is unremarkable.     There is a small amount of pneumoperitoneum. There is marked stranding  within the mesentery as well as scattered areas of adjacent loops of  small bowel. There are markedly dilated loops of proximal mid and  distal  small bowel with a transition zone that is difficult to determine, but  appears to be in the large complex ventral hernia. The transition zone  appears to be in the region of the axial image #165.     No adenopathy. No aggressive appearing lytic or sclerotic bone lesions.  Mild aneurysmal dilation of the infrarenal abdominal aorta. No  aggressive appearing lytic or sclerotic bone lesions are identified..          Impression:         1. High-grade small bowel obstruction with a transition point in the  large ventral hernia in the region of the distal small bowel.  2. There is a small amount of pneumoperitoneum as well as pneumatosis in  the small bowel in the right upper quadrant adjacent to prior suture  line. This is best seen on axial image #102. There has been pneumatosis  seen in these loops of small bowel on prior exams.  3. There is also significant stranding in the mesentery as well as fluid  adjacent to these loops of small bowel. This is a new finding compared  to the prior exams.  4. Together these findings are concerning for threatened loops of small  bowel and recommend surgical consultation for further workup and  management.           Electronically Signed By-Jc Camara MD On:12/7/2022 4:22 PM  This report was finalized on [id number] by  Jc Camara MD.           Lab Results (last 24 hours)     Procedure Component Value Units Date/Time    [place] Draw [[id number]] Collected: 12/07/22 1350    Specimen: Blood Updated: 12/07/22 1501    Narrative:      The following orders were created for panel order [place] Draw.  Procedure                               Abnormality         Status                     ---------                               -----------         ------                     Green Top (Gel)[[id number]]                                  Final result               Lavender Top[[id number]]                                     Final result               Gold Top - SST[[id number]]                                    Final result               Light Blue Top[[id number]]                                   Final result                 Please view results for these tests on the individual orders.    Lavender Top [[id number]] Collected: 12/07/22 1350    Specimen: Blood Updated: 12/07/22 1501     Extra Tube hold for add-on     Comment: Auto resulted       Light Blue Top [[id number]] Collected: 12/07/22 1350    Specimen: Blood Updated: 12/07/22 1501     Extra Tube Hold for add-ons.     Comment: Auto resulted       Green Top (Gel) [[id number]] Collected: 12/07/22 1350    Specimen: Blood Updated: 12/07/22 1501     Extra Tube Hold for add-ons.     Comment: Auto resulted.       Gold Top - SST [[id number]] Collected: 12/07/22 1350    Specimen: Blood Updated: 12/07/22 1501     Extra Tube Hold for add-ons.     Comment: Auto resulted.       COVID-19,CEPHEID/BRITTANI,COR/SMOOTH/PAD/SARAI/MAD IN-HOUSE(OR EMERGENT/ADD-ON),NP SWAB IN TRANSPORT MEDIA 3-4 HR TAT, RT-PCR - Swab, Nasopharynx [[id number]]  (Normal) Collected: 12/07/22 1408    Specimen: Swab from Nasopharynx Updated: 12/07/22 1455     COVID19 Not Detected    Narrative:      Fact sheet for providers: https://www.fda.gov/media/390911/download     Fact sheet for patients: https://www.fda.gov/media/075728/download  Fact sheet for providers: https://www.fda.gov/media/385974/download     Fact sheet for patients: https://www.fda.gov/media/735894/download    Comprehensive Metabolic Panel [[id number]]  (Abnormal) Collected: 12/07/22 1350    Specimen: Blood Updated: 12/07/22 1434     Glucose 144 mg/dL      BUN 30 mg/dL      Creatinine 1.53 mg/dL      Sodium 136 mmol/L      Potassium 4.3 mmol/L      Chloride 94 mmol/L      CO2 33.0 mmol/L      Calcium 9.1 mg/dL      Total Protein 6.8 g/dL      Albumin 3.60 g/dL      ALT (SGPT) 6 U/L      AST (SGOT) 14 U/L      Alkaline Phosphatase 121 U/L      Total Bilirubin 0.6 mg/dL      Globulin 3.2 gm/dL      A/G Ratio 1.1 g/dL      BUN/Creatinine  Ratio 19.6     Anion Gap 9.0 mmol/L      eGFR 46.8 mL/min/1.73      Comment: National Kidney Foundation and American Society of Nephrology (ASN) Task Force recommended calculation based on the Chronic Kidney Disease Epidemiology Collaboration (CKD-EPI) equation refit without adjustment for race.       Narrative:      GFR Normal >60  Chronic Kidney Disease <60  Kidney Failure <15    The GFR formula is only valid for adults with stable renal function between ages 18 and 70.    Lipase [[id number]]  (Normal) Collected: 12/07/22 1350    Specimen: Blood Updated: 12/07/22 1423     Lipase 48 U/L     Protime-INR [[id number]]  (Normal) Collected: 12/07/22 1350    Specimen: Blood Updated: 12/07/22 1414     Protime 11.2 Seconds      INR 1.09    CBC & Differential [[id number]]  (Abnormal) Collected: 12/07/22 1350    Specimen: Blood Updated: 12/07/22 1404    Narrative:      The following orders were created for panel order CBC & Differential.  Procedure                               Abnormality         Status                     ---------                               -----------         ------                     CBC Auto Differential[[id number]]        Abnormal            Final result                 Please view results for these tests on the individual orders.    CBC Auto Differential [[id number]]  (Abnormal) Collected: 12/07/22 1350    Specimen: Blood Updated: 12/07/22 1404     WBC 12.20 10*3/mm3      RBC 3.66 10*6/mm3      Hemoglobin 9.4 g/dL      Hematocrit 30.8 %      MCV 84.2 fL      MCH 25.6 pg      MCHC 30.4 g/dL      RDW 21.5 %      RDW-SD 63.4 fl      MPV 7.7 fL      Platelets 497 10*3/mm3      Neutrophil % 84.9 %      Lymphocyte % 5.6 %      Monocyte % 4.6 %      Eosinophil % 3.8 %      Basophil % 1.1 %      Neutrophils, Absolute 10.40 10*3/mm3      Lymphocytes, Absolute 0.70 10*3/mm3      Monocytes, Absolute 0.60 10*3/mm3      Eosinophils, Absolute 0.50 10*3/mm3      Basophils, Absolute 0.10 10*3/mm3      nRBC 0.0 /100  WBC            I reviewed the patient's new clinical results.    Assessment & Plan       Generalized abdominal pain    Ventral hernia without obstruction or gangrene    SBO (small bowel obstruction) (HCC)    Pneumoperitoneum    -CT A/P shows high grade SBO with transition point in the large ventral hernia, small amount of pneumoperitoneum and pneumatosis, significant stranding in the mesentery and fluid.  -General surgery consult, recommend decompression by NG tube, due to being a poor surgical candidate  -pain control  -Zosyn      DVT prophylaxis- SCD's  GI prophylaxis- pepcid      I discussed the patient's findings and my recommendations with patient.     Uma Neal, APRN  12/08/22  01:44 EST

## 2022-12-08 NOTE — PROGRESS NOTES
GENERAL SURGERY PROGRESS NOTE  Chief Complaint:  Surgery Follow up   LOS: 0 days       Subjective     Interval History:     Feels ok. Less distended with NGT in place. Passing gas.    Objective     Vital Signs  Temp:  [98.2 °F (36.8 °C)] 98.2 °F (36.8 °C)  Heart Rate:  [70-87] 70  Resp:  [16-18] 17  BP: ()/(38-61) 127/61    Physical Exam:   Large complex ventral hernia. Minimally tender near right lower region.  Labs:  Lab Results (last 24 hours)     Procedure Component Value Units Date/Time    Basic Metabolic Panel [[id number]]  (Abnormal) Collected: 12/08/22 1536    Specimen: Blood Updated: 12/08/22 1618     Glucose 108 mg/dL      BUN 23 mg/dL      Creatinine 1.18 mg/dL      Sodium 138 mmol/L      Potassium 3.6 mmol/L      Chloride 96 mmol/L      CO2 34.0 mmol/L      Calcium 8.5 mg/dL      BUN/Creatinine Ratio 19.5     Anion Gap 8.0 mmol/L      eGFR 64.0 mL/min/1.73      Comment: National Kidney Foundation and American Society of Nephrology (ASN) Task Force recommended calculation based on the Chronic Kidney Disease Epidemiology Collaboration (CKD-EPI) equation refit without adjustment for race.       Narrative:      GFR Normal >60  Chronic Kidney Disease <60  Kidney Failure <15    The GFR formula is only valid for adults with stable renal function between ages 18 and 70.    CBC & Differential [[id number]]  (Abnormal) Collected: 12/08/22 1536    Specimen: Blood Updated: 12/08/22 1559    Narrative:      The following orders were created for panel order CBC & Differential.  Procedure                               Abnormality         Status                     ---------                               -----------         ------                     CBC Auto Differential[[id number]]        Abnormal            Final result                 Please view results for these tests on the individual orders.    CBC Auto Differential [[id number]]  (Abnormal) Collected: 12/08/22 1536    Specimen: Blood Updated: 12/08/22 1559      WBC 7.60 10*3/mm3      RBC 3.05 10*6/mm3      Hemoglobin 8.0 g/dL      Hematocrit 25.5 %      MCV 83.4 fL      MCH 26.3 pg      MCHC 31.5 g/dL      RDW 21.3 %      RDW-SD 65.6 fl      MPV 7.8 fL      Platelets 372 10*3/mm3      Neutrophil % 76.5 %      Lymphocyte % 10.1 %      Monocyte % 8.2 %      Eosinophil % 4.4 %      Basophil % 0.8 %      Neutrophils, Absolute 5.80 10*3/mm3      Lymphocytes, Absolute 0.80 10*3/mm3      Monocytes, Absolute 0.60 10*3/mm3      Eosinophils, Absolute 0.30 10*3/mm3      Basophils, Absolute 0.10 10*3/mm3      nRBC 0.0 /100 WBC     POC Glucose Once [[id number]]  (Normal) Collected: 12/08/22 1550    Specimen: Blood Updated: 12/08/22 1551     Glucose 85 mg/dL      Comment: Serial Number: [id number]:  [id number]       Blood Culture - Blood, Arm, Left [[id number]] Collected: 12/08/22 1129    Specimen: Blood from Arm, Left Updated: 12/08/22 1133    Blood Culture - Blood, Arm, Left [[id number]] Collected: 12/08/22 1129    Specimen: Blood from Arm, Left Updated: 12/08/22 1133           Results Review:     Labs and imaging for today were reviewed.    Assessment & Plan     Ro Nathan is a 77 y.o. male who has recurrent SBO due to large complex ventral hernia      Overall improved. Appears to be resolving with decompression. Continue NGT. May attempt SBFT tomorrow. No plans for operation as patient essentially has a non-operable abdomen.  He is aware of this.          This document has been electronically signed by Agustin Ricks MD on December 8, 2022 16:44 EST        Agustin Ricks MD  12/08/22  16:44 EST

## 2022-12-08 NOTE — OUTREACH NOTE
Medical Week 2 Survey    Flowsheet Row Responses   [facility] patient discharged from? [facility]   Does the patient have one of the following disease processes/diagnoses(primary or secondary)? Other   Week 2 attempt successful? No   Unsuccessful attempts Attempt 1   Revoke Readmitted          JOSESITO PATEL - Registered Nurse

## 2022-12-09 NOTE — PROGRESS NOTES
LOS: 1 day   Patient Care Team:  Yusuf Jones MD as PCP - General  PhiJoshua MD as Consulting Physician (Cardiology)  Moise Watson MD as Consulting Physician (Cardiac Electrophysiology)  Izzy Alvarez MD as Consulting Physician (Nephrology)  Celine Swanson MD as Consulting Physician (Cardiology)    Subjective:  He feels better overall    Objective:   Large bowel movement yesterday noted//afebrile      Review of Systems:   Review of Systems   Constitutional: Positive for activity change.   Cardiovascular: Negative.    Gastrointestinal: Positive for abdominal distention.   Neurological: Positive for weakness.           Vital Signs  Temp:  [98.2 °F (36.8 °C)] 98.2 °F (36.8 °C)  Heart Rate:  [] 75  Resp:  [16-18] 16  BP: (111-132)/(42-80) 132/53    Physical Exam:  Physical Exam  Constitutional:       Appearance: Normal appearance.   Cardiovascular:      Rate and Rhythm: Normal rate.      Heart sounds: Normal heart sounds.   Pulmonary:      Breath sounds: Normal breath sounds.   Abdominal:      General: There is distension.      Tenderness: There is no abdominal tenderness.      Hernia: A hernia is present.   Neurological:      Mental Status: He is alert.          Radiology:  CT Abdomen Pelvis Without Contrast    Result Date: 12/7/2022   1. High-grade small bowel obstruction with a transition point in the large ventral hernia in the region of the distal small bowel. 2. There is a small amount of pneumoperitoneum as well as pneumatosis in the small bowel in the right upper quadrant adjacent to prior suture line. This is best seen on axial image #102. There has been pneumatosis seen in these loops of small bowel on prior exams. 3. There is also significant stranding in the mesentery as well as fluid adjacent to these loops of small bowel. This is a new finding compared to the prior exams. 4. Together these findings are concerning for threatened loops of small bowel and  recommend surgical consultation for further workup and management.    Electronically Signed By-Jc Camara MD On:12/7/2022 4:22 PM This report was finalized on [id number] by  Jc Camara MD.         Results Review:     I reviewed the patient's new clinical results.  I reviewed the patient's new imaging results and agree with the interpretation.    Medication Review:   Scheduled Meds:budesonide, 0.5 mg, Nebulization, BID - RT  famotidine, 20 mg, Intravenous, Q12H  insulin lispro, 2-9 Units, Subcutaneous, Q6H  piperacillin-tazobactam, 4.5 g, Intravenous, Q8H  sodium chloride, 3 mL, Intravenous, Q12H      Continuous Infusions:lactated ringers, 150 mL/hr, Last Rate: 150 mL/hr (12/09/22 0642)      PRN Meds:.•  acetaminophen  •  albuterol  •  dextrose  •  dextrose  •  glucagon (human recombinant)  •  hydrALAZINE  •  HYDROmorphone  •  ipratropium-albuterol  •  nitroglycerin  •  ondansetron  •  sodium chloride  •  sodium chloride    Labs:    CBC    Results from last 7 days   Lab Units 12/09/22  0543 12/08/22  1536 12/07/22  1350   WBC 10*3/mm3 7.50 7.60 12.20*   HEMOGLOBIN g/dL 8.3* 8.0* 9.4*   PLATELETS 10*3/mm3 376 372 497*     BMP   Results from last 7 days   Lab Units 12/09/22  0543 12/08/22  1536 12/07/22  1350   SODIUM mmol/L 136 138 136   POTASSIUM mmol/L 3.8 3.6 4.3   CHLORIDE mmol/L 95* 96* 94*   CO2 mmol/L 32.0* 34.0* 33.0*   BUN mg/dL 18 23 30*   CREATININE mg/dL 1.02 1.18 1.53*   GLUCOSE mg/dL 109* 108* 144*   MAGNESIUM mg/dL 2.2  --   --    PHOSPHORUS mg/dL 3.2  --   --      Cr Clearance Estimated Creatinine Clearance: 85.2 mL/min (by C-G formula based on SCr of 1.02 mg/dL).  Coag   Results from last 7 days   Lab Units 12/07/22  1350   INR  1.09     HbA1C   Lab Results   Component Value Date    HGBA1C 7.3 (H) 10/12/2022    HGBA1C 8.5 (H) 06/02/2022    HGBA1C 8.5 (H) 04/19/2022     Blood Glucose   Glucose   Date/Time Value Ref Range Status   12/09/2022 0213 96 70 - 105 mg/dL Final     Comment:     Serial  Number: [id number]:  [id number]   12/08/2022 1550 85 70 - 105 mg/dL Final     Comment:     Serial Number: [id number]:  [id number]     Infection     CMP   Results from last 7 days   Lab Units 12/09/22  0543 12/08/22  1536 12/07/22  1350   SODIUM mmol/L 136 138 136   POTASSIUM mmol/L 3.8 3.6 4.3   CHLORIDE mmol/L 95* 96* 94*   CO2 mmol/L 32.0* 34.0* 33.0*   BUN mg/dL 18 23 30*   CREATININE mg/dL 1.02 1.18 1.53*   GLUCOSE mg/dL 109* 108* 144*   ALBUMIN g/dL  --   --  3.60   BILIRUBIN mg/dL  --   --  0.6   ALK PHOS U/L  --   --  121*   AST (SGOT) U/L  --   --  14   ALT (SGPT) U/L  --   --  6   LIPASE U/L  --   --  48     UA      Radiology(recent) CT Abdomen Pelvis Without Contrast    Result Date: 12/7/2022   1. High-grade small bowel obstruction with a transition point in the large ventral hernia in the region of the distal small bowel. 2. There is a small amount of pneumoperitoneum as well as pneumatosis in the small bowel in the right upper quadrant adjacent to prior suture line. This is best seen on axial image #102. There has been pneumatosis seen in these loops of small bowel on prior exams. 3. There is also significant stranding in the mesentery as well as fluid adjacent to these loops of small bowel. This is a new finding compared to the prior exams. 4. Together these findings are concerning for threatened loops of small bowel and recommend surgical consultation for further workup and management.    Electronically Signed By-Jc Camara MD On:12/7/2022 4:22 PM This report was finalized on [id number] by  Jc Camara MD.     Assessment:  High-grade SBO  Inoperable massive ventral herniation  Peptic ulcer disease  History of erosive gastritis  History of grade B esophagitis  Gastroesophageal reflux disease with esophagitis  Herbert's esophageal change  DM II with renal manifestations  DMII with angiopathic manifestations  DMII with neuropathic manifestations  Diabetic dyslipidemia  Panlobular COPD  with emphysema  Nicotine dependency with nicotine use disorder, cigarettes  DDD L/S  CKD IIIa  HTN associated with CKD IIIa  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  Cirrhosis  Small vessel PAD secondary to diabetes  Diabetic lower extremity ulcerations  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, MOSD  History of right renal mass  3.4 cm infrarenal abdominal aortic aneurysm  Valvular heart disease with significant mitral regurgitation  Paroxysmal atrial fibrillation  Hypercoagulable state secondary to atrial fibrillation  Chronic oral anticoagulation therapy with direct thrombin inhibition  ACD  Chronic systolic congestive heart failure  Chronic hypoxic respiratory failure  Supplemental oxygen dependency  Chronic mucopurulent bronchitis  History of prostate cancer  Morbid exogenous obesity  Hypoventilation apnea syndrome with ARTI  Peripheral polyneuropathy secondary to alcohol and diabetes  Autonomic neuropathy secondary to diabetes with autonomic dysfunction syndrome  Monoclonal paraproteinemia  Narcotic dependency  Vitamin D deficiency  ETOHISM  H/O pacemaker placement    Plan:  NG management per surgery//anticipate removal with slow advancement of diet        Yusuf Jones MD  12/09/22  08:19 EST

## 2022-12-09 NOTE — PLAN OF CARE
Goal Outcome Evaluation:      Discussed plan of care with pt. Pt expressed concerns about going to sleep due to potential of pulling NG tube out during sleep. RN secured NG tube to face with tape. Pt seemed relieved by this.

## 2022-12-09 NOTE — PROGRESS NOTES
GENERAL SURGERY PROGRESS NOTE  Chief Complaint:  Surgery Follow up   LOS: 1 day       Subjective     Interval History:     His NG tube came out earlier today apparently.  He has been passing gas and had a bowel movement.  He was started on clear liquids prior to me seeing him.  He appears to be tolerating this.  Overall he states that he feels nearly back to his baseline.    Objective     Vital Signs  Temp:  [98.2 °F (36.8 °C)] 98.2 °F (36.8 °C)  Heart Rate:  [] 72  Resp:  [16-18] 18  BP: (114-132)/(53-80) 132/53    Physical Exam:   Abdomen soft large complex ventral hernia  Labs:  Lab Results (last 24 hours)     Procedure Component Value Units Date/Time    POC Glucose Once [[id number]]  (Normal) Collected: 12/09/22 0853    Specimen: Blood Updated: 12/09/22 0855     Glucose 101 mg/dL      Comment: Serial Number: [id number]:  [id number]       Basic Metabolic Panel [[id number]]  (Abnormal) Collected: 12/09/22 0543    Specimen: Blood Updated: 12/09/22 0631     Glucose 109 mg/dL      BUN 18 mg/dL      Creatinine 1.02 mg/dL      Sodium 136 mmol/L      Potassium 3.8 mmol/L      Chloride 95 mmol/L      CO2 32.0 mmol/L      Calcium 8.3 mg/dL      BUN/Creatinine Ratio 17.6     Anion Gap 9.0 mmol/L      eGFR 76.2 mL/min/1.73      Comment: National Kidney Foundation and American Society of Nephrology (ASN) Task Force recommended calculation based on the Chronic Kidney Disease Epidemiology Collaboration (CKD-EPI) equation refit without adjustment for race.       Narrative:      GFR Normal >60  Chronic Kidney Disease <60  Kidney Failure <15    The GFR formula is only valid for adults with stable renal function between ages 18 and 70.    Phosphorus [[id number]]  (Normal) Collected: 12/09/22 0543    Specimen: Blood Updated: 12/09/22 0631     Phosphorus 3.2 mg/dL     Magnesium [[id number]]  (Normal) Collected: 12/09/22 0543    Specimen: Blood Updated: 12/09/22 0631     Magnesium 2.2 mg/dL     CBC & Differential [[id number]]   (Abnormal) Collected: 12/09/22 0543    Specimen: Blood Updated: 12/09/22 0601    Narrative:      The following orders were created for panel order CBC & Differential.  Procedure                               Abnormality         Status                     ---------                               -----------         ------                     CBC Auto Differential[[id number]]        Abnormal            Final result                 Please view results for these tests on the individual orders.    CBC Auto Differential [[id number]]  (Abnormal) Collected: 12/09/22 0543    Specimen: Blood Updated: 12/09/22 0601     WBC 7.50 10*3/mm3      RBC 3.13 10*6/mm3      Hemoglobin 8.3 g/dL      Hematocrit 25.7 %      MCV 82.2 fL      MCH 26.4 pg      MCHC 32.1 g/dL      RDW 21.4 %      RDW-SD 65.2 fl      MPV 7.7 fL      Platelets 376 10*3/mm3      Neutrophil % 78.3 %      Lymphocyte % 8.5 %      Monocyte % 8.8 %      Eosinophil % 3.7 %      Basophil % 0.7 %      Neutrophils, Absolute 5.80 10*3/mm3      Lymphocytes, Absolute 0.60 10*3/mm3      Monocytes, Absolute 0.70 10*3/mm3      Eosinophils, Absolute 0.30 10*3/mm3      Basophils, Absolute 0.10 10*3/mm3      nRBC 0.0 /100 WBC     POC Glucose Once [[id number]]  (Normal) Collected: 12/09/22 0213    Specimen: Blood Updated: 12/09/22 0214     Glucose 96 mg/dL      Comment: Serial Number: [id number]:  [id number]       Basic Metabolic Panel [[id number]]  (Abnormal) Collected: 12/08/22 1536    Specimen: Blood Updated: 12/08/22 1618     Glucose 108 mg/dL      BUN 23 mg/dL      Creatinine 1.18 mg/dL      Sodium 138 mmol/L      Potassium 3.6 mmol/L      Chloride 96 mmol/L      CO2 34.0 mmol/L      Calcium 8.5 mg/dL      BUN/Creatinine Ratio 19.5     Anion Gap 8.0 mmol/L      eGFR 64.0 mL/min/1.73      Comment: National Kidney Foundation and American Society of Nephrology (ASN) Task Force recommended calculation based on the Chronic Kidney Disease Epidemiology Collaboration (CKD-EPI)  equation refit without adjustment for race.       Narrative:      GFR Normal >60  Chronic Kidney Disease <60  Kidney Failure <15    The GFR formula is only valid for adults with stable renal function between ages 18 and 70.    CBC & Differential [[id number]]  (Abnormal) Collected: 12/08/22 1536    Specimen: Blood Updated: 12/08/22 1559    Narrative:      The following orders were created for panel order CBC & Differential.  Procedure                               Abnormality         Status                     ---------                               -----------         ------                     CBC Auto Differential[[id number]]        Abnormal            Final result                 Please view results for these tests on the individual orders.    CBC Auto Differential [[id number]]  (Abnormal) Collected: 12/08/22 1536    Specimen: Blood Updated: 12/08/22 1559     WBC 7.60 10*3/mm3      RBC 3.05 10*6/mm3      Hemoglobin 8.0 g/dL      Hematocrit 25.5 %      MCV 83.4 fL      MCH 26.3 pg      MCHC 31.5 g/dL      RDW 21.3 %      RDW-SD 65.6 fl      MPV 7.8 fL      Platelets 372 10*3/mm3      Neutrophil % 76.5 %      Lymphocyte % 10.1 %      Monocyte % 8.2 %      Eosinophil % 4.4 %      Basophil % 0.8 %      Neutrophils, Absolute 5.80 10*3/mm3      Lymphocytes, Absolute 0.80 10*3/mm3      Monocytes, Absolute 0.60 10*3/mm3      Eosinophils, Absolute 0.30 10*3/mm3      Basophils, Absolute 0.10 10*3/mm3      nRBC 0.0 /100 WBC     POC Glucose Once [[id number]]  (Normal) Collected: 12/08/22 1550    Specimen: Blood Updated: 12/08/22 1551     Glucose 85 mg/dL      Comment: Serial Number: [id number]:  [id number]       Blood Culture - Blood, Arm, Left [[id number]] Collected: 12/08/22 1129    Specimen: Blood from Arm, Left Updated: 12/08/22 1133    Blood Culture - Blood, Arm, Left [[id number]] Collected: 12/08/22 1129    Specimen: Blood from Arm, Left Updated: 12/08/22 1133           Results Review:     Labs and imaging for today  were reviewed.    Assessment & Plan     Ro Nathan is a 77 y.o. male who has a large complex ventral hernia with recurrent small bowel obstructions      Continue liquids for now.  If continues to tolerate well likely can be advanced to a full liquid diet.  I would advise him to be on a primarily full liquid type diet even at home with some soft food to help try to prevent further episodes of obstruction.          This document has been electronically signed by Agustin Ricks MD on December 9, 2022 10:25 EST        Agustin Ricks MD  12/09/22  10:25 EST

## 2022-12-09 NOTE — DISCHARGE PLACEMENT REQUEST
"Ro Sidhu (77 y.o. Male)     Date of Birth   1945    Social Security Number       Address   [address] [place] [address] [place] [facility]    Home Phone   [phone]    MRN   [id number]       Rastafari   Druze    Marital Status                               Admission Date   12/7/22    Admission Type   Emergency    Admitting Provider   Yusuf Jones MD    Attending Provider   Yusuf Jones MD    Department, Room/Bed   [facility] 3C MEDICAL INPATIENT, 372/1       Discharge Date       Discharge Disposition       Discharge Destination                               Attending Provider: Yusfu Jones MD    Allergies: Haloperidol, Morphine, Pantoprazole, Latex    Isolation: None   Infection: MRSA No Isolation this Admit (10/13/22)   Code Status: CPR    Ht: 182.9 cm (72\")   Wt: 128 kg (282 lb 3 oz)    Admission Cmt: None   Principal Problem: None                Active Insurance as of 12/7/2022     Primary Coverage     Payor Plan Insurance Group Employer/Plan Group    HUMANA MEDICARE REPLACEMENT HUMANA MEDICARE REPLACEMENT [id number]     Payor Plan Address Payor Plan Phone Number Payor Plan Fax Number Effective Dates    [address] [address] [phone]  1/1/2018 - None Entered    [facility] [id number]       Subscriber Name Subscriber Birth Date Member ID       RO SIDHU BETH 1945 [id number]                 Emergency Contacts      (Rel.) Home Phone Work Phone Mobile Phone    LONI SIDHU (Spouse) [phone] -- [phone]    MaryluTierra (Daughter) -- -- [phone]        "

## 2022-12-09 NOTE — PLAN OF CARE
Goal Outcome Evaluation:  Plan of Care Reviewed With: patient        Progress: no change  Outcome Evaluation: Patient came up from the ER this afternoon. He has a small bowel obstruction. NG tube was removed this morning. Diet advanced to clear liquids. Will see how he tolerates. Pain controlled with IV Dilaudid.

## 2022-12-09 NOTE — CASE MANAGEMENT/SOCIAL WORK
Continued Stay Note   Claus     Patient Name: Ro Nathan  MRN: [id number]  Today's Date: 12/9/2022    Admit Date: 12/7/2022    Plan: D/C Plan: Home with family, current Home O2 (doent remember provider)   Discharge Plan     Row Name 12/09/22 0809       Plan    Plan Comments DC Barriers: NG Tube, IV antibiotics, IV fluids. Plan is home with family.Previously had [facility]. Has home O2 with unknown provider per prior case management notes.               Discharge Codes    No documentation.               Expected Discharge Date and Time     Expected Discharge Date Expected Discharge Time    Dec 12, 2022         Jagruti Pool RN, [facility]  Office: [phone]  Fax: [phone]  [email or web address]      Phone communication or documentation only - no physical contact with patient or family.    Jagruti Pool RN

## 2022-12-10 NOTE — PROGRESS NOTES
GENERAL SURGERY PROGRESS NOTE  Chief Complaint:  Surgery Follow up   LOS: 2 days       Subjective     Interval History:     Appears to be tolerating diet.  Eating regular foods and continues to pass gas and have bowel movements.    Objective     Vital Signs  Temp:  [97.4 °F (36.3 °C)-98 °F (36.7 °C)] 97.6 °F (36.4 °C)  Heart Rate:  [71-78] 74  Resp:  [16-20] 18  BP: (100-130)/(46-77) 111/57    Physical Exam:   Large complex ventral hernia, abdomen soft  Labs:  Lab Results (last 24 hours)     Procedure Component Value Units Date/Time    POC Glucose Once [[id number]]  (Abnormal) Collected: 12/10/22 0717    Specimen: Blood Updated: 12/10/22 0718     Glucose 116 mg/dL      Comment: Serial Number: [id number]:  [id number]       Basic Metabolic Panel [[id number]]  (Abnormal) Collected: 12/10/22 0511    Specimen: Blood Updated: 12/10/22 0706     Glucose 121 mg/dL      BUN 15 mg/dL      Creatinine 1.13 mg/dL      Sodium 136 mmol/L      Potassium 3.4 mmol/L      Chloride 95 mmol/L      CO2 32.0 mmol/L      Calcium 8.6 mg/dL      BUN/Creatinine Ratio 13.3     Anion Gap 9.0 mmol/L      eGFR 67.4 mL/min/1.73      Comment: National Kidney Foundation and American Society of Nephrology (ASN) Task Force recommended calculation based on the Chronic Kidney Disease Epidemiology Collaboration (CKD-EPI) equation refit without adjustment for race.       Narrative:      GFR Normal >60  Chronic Kidney Disease <60  Kidney Failure <15    The GFR formula is only valid for adults with stable renal function between ages 18 and 70.    Magnesium [[id number]]  (Normal) Collected: 12/10/22 0511    Specimen: Blood Updated: 12/10/22 0706     Magnesium 1.7 mg/dL     Phosphorus [[id number]]  (Normal) Collected: 12/10/22 0511    Specimen: Blood Updated: 12/10/22 0706     Phosphorus 2.6 mg/dL     CBC & Differential [[id number]]  (Abnormal) Collected: 12/10/22 0511    Specimen: Blood Updated: 12/10/22 0656    Narrative:      The following orders were  created for panel order CBC & Differential.  Procedure                               Abnormality         Status                     ---------                               -----------         ------                     CBC Auto Differential[[id number]]        Abnormal            Final result                 Please view results for these tests on the individual orders.    CBC Auto Differential [[id number]]  (Abnormal) Collected: 12/10/22 0511    Specimen: Blood Updated: 12/10/22 0656     WBC 5.80 10*3/mm3      RBC 3.04 10*6/mm3      Hemoglobin 7.9 g/dL      Hematocrit 24.8 %      MCV 81.6 fL      MCH 25.8 pg      MCHC 31.7 g/dL      RDW 21.3 %      RDW-SD 63.9 fl      MPV 7.9 fL      Platelets 378 10*3/mm3      Neutrophil % 69.4 %      Lymphocyte % 12.9 %      Monocyte % 11.2 %      Eosinophil % 5.7 %      Basophil % 0.8 %      Neutrophils, Absolute 4.00 10*3/mm3      Lymphocytes, Absolute 0.80 10*3/mm3      Monocytes, Absolute 0.70 10*3/mm3      Eosinophils, Absolute 0.30 10*3/mm3      Basophils, Absolute 0.00 10*3/mm3      nRBC 0.1 /100 WBC     POC Glucose Once [[id number]]  (Abnormal) Collected: 12/09/22 1631    Specimen: Blood Updated: 12/09/22 1633     Glucose 133 mg/dL      Comment: Serial Number: [id number]:  [id number]       POC Glucose Once [[id number]]  (Normal) Collected: 12/09/22 1211    Specimen: Blood Updated: 12/09/22 1212     Glucose 100 mg/dL      Comment: Serial Number: [id number]:  [id number]       Blood Culture - Blood, Arm, Left [[id number]]  (Normal) Collected: 12/08/22 1129    Specimen: Blood from Arm, Left Updated: 12/09/22 1145     Blood Culture No growth at 24 hours    Narrative:      Less than seven (7) mL's of blood was collected.  Insufficient quantity may yield false negative results.    Blood Culture - Blood, Arm, Left [[id number]]  (Normal) Collected: 12/08/22 1129    Specimen: Blood from Arm, Left Updated: 12/09/22 1145     Blood Culture No growth at 24 hours           Results  Review:     Labs and imaging for today were reviewed.    Assessment & Plan     Ro Nathan is a 77 y.o. male who has resolving, recurrent small bowel obstruction due to large complex ventral hernia with an unreconstructable abdominal wall defect      I believe the patient can go home today on a soft diet.  He also anticipates drinking plenty of fluids at home and using stool softeners to help avoid recurrent obstruction.          This document has been electronically signed by Agustin Ricks MD on December 10, 2022 11:14 OMARI Ricks MD  12/10/22  11:14 EST

## 2022-12-10 NOTE — PLAN OF CARE
Continue to monitor and assess pain.  Pt is alert and oriented and not complaining of any  pain.   Problem: Adult Inpatient Plan of Care  Goal: Plan of Care Review  Outcome: Ongoing, Progressing  Flowsheets  Taken 12/10/2022 0609 by Savana Martinez LPN  Outcome Evaluation: Patient did well during the night.  Handling clears.  Dr. Jones in the morning moved patient up to full liquids and to advance to a GI diet a lunch if tolerates full liquids.  If tolerates well can DC this afternoon.  Stable at this time.  Taken 12/9/2022 1710 by Antoinette Phipps RN  Progress: no change  Plan of Care Reviewed With: patient  Goal: Patient-Specific Goal (Individualized)  Outcome: Ongoing, Progressing  Goal: Absence of Hospital-Acquired Illness or Injury  Outcome: Ongoing, Progressing  Intervention: Identify and Manage Fall Risk  Flowsheets (Taken 12/10/2022 0600 by Savana Martinez LPN)  Safety Promotion/Fall Prevention:   toileting scheduled   safety round/check completed   room organization consistent   assistive device/personal items within reach   clutter free environment maintained  Intervention: Prevent Skin Injury  Flowsheets (Taken 12/9/2022 2021 by Savana Martinez LPN)  Body Position: position changed independently  Skin Protection: adhesive use limited  Intervention: Prevent and Manage VTE (Venous Thromboembolism) Risk  Flowsheets (Taken 12/9/2022 2021 by Savana Martinez LPN)  Activity Management: activity adjusted per tolerance  Intervention: Prevent Infection  Flowsheets (Taken 12/10/2022 0711)  Infection Prevention:   personal protective equipment utilized   rest/sleep promoted   single patient room provided   hand hygiene promoted  Goal: Optimal Comfort and Wellbeing  Outcome: Ongoing, Progressing  Intervention: Monitor Pain and Promote Comfort  Flowsheets (Taken 12/9/2022 0812 by Rach Rivera, RN)  Pain Management Interventions: quiet environment facilitated  Intervention: Provide Person-Centered Care  Flowsheets (Taken  Hospitalist 12/10/2022 0711)  Trust Relationship/Rapport:   care explained   questions encouraged   questions answered  Goal: Readiness for Transition of Care  Outcome: Ongoing, Progressing  Intervention: Mutually Develop Transition Plan  Flowsheets (Taken 12/8/2022 1431 by Ijeoma Galindo)  Equipment Needed After Discharge: none  Equipment Currently Used at Home:   oxygen   walker, standard  Transportation Anticipated: family or friend will provide  Concerns to be Addressed: denies needs/concerns at this time  Patient/Family Anticipated Services at Transition:   Patient/Family Anticipates Transition to: home with family     Problem: Pain Acute  Goal: Acceptable Pain Control and Functional Ability  Outcome: Ongoing, Progressing  Intervention: Prevent or Manage Pain  Flowsheets  Taken 12/10/2022 0600 by Savana Martinez LPN  Medication Review/Management: medications reviewed  Taken 12/7/2022 2040 by Ashley Estes LPN  Sleep/Rest Enhancement: awakenings minimized  Intervention: Develop Pain Management Plan  Flowsheets (Taken 12/9/2022 0812 by Rach Rivera, RN)  Pain Management Interventions: quiet environment facilitated  Intervention: Optimize Psychosocial Wellbeing  Flowsheets (Taken 12/9/2022 2021 by Savana Martinez LPN)  Supportive Measures: active listening utilized  Diversional Activities: television     Problem: Fluid Deficit (Intestinal Obstruction)  Goal: Fluid Balance  Outcome: Ongoing, Progressing  Intervention: Monitor and Manage Hypovolemia  Flowsheets (Taken 12/10/2022 0711)  Fluid/Electrolyte Management: electrolyte-binding therapy initiated     Problem: Infection (Intestinal Obstruction)  Goal: Absence of Infection Signs and Symptoms  Outcome: Ongoing, Progressing  Intervention: Prevent or Manage Infection  Flowsheets (Taken 12/9/2022 1018 by Rach Rivera, RN)  Infection Management: aseptic technique maintained     Problem: Nausea and Vomiting (Intestinal Obstruction)  Goal: Nausea and Vomiting  Relief  Outcome: Ongoing, Progressing  Intervention: Prevent and Manage Nausea and Vomiting  Flowsheets (Taken 12/10/2022 0711)  Aspiration Precautions: oral hygiene care promoted     Problem: Pain (Intestinal Obstruction)  Goal: Acceptable Pain Control  Outcome: Ongoing, Progressing  Intervention: Monitor and Manage Pain  Flowsheets  Taken 12/9/2022 2021 by Savana Martinez LPN  Diversional Activities: television  Taken 12/9/2022 0812 by Rach Rivera RN  Pain Management Interventions: quiet environment facilitated   Goal Outcome Evaluation:

## 2022-12-10 NOTE — DISCHARGE SUMMARY
Date of Discharge:  12/10/2022    Discharge Diagnosis:     High-grade SBO  Inoperable massive ventral herniation  Peptic ulcer disease  History of erosive gastritis  History of grade B esophagitis  Gastroesophageal reflux disease with esophagitis  Herbert's esophageal change  DM II with renal manifestations  DMII with angiopathic manifestations  DMII with neuropathic manifestations  Diabetic dyslipidemia  Panlobular COPD with emphysema  Nicotine dependency with nicotine use disorder, cigarettes  DDD L/S  CKD IIIa  HTN associated with CKD IIIa  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  Cirrhosis  Small vessel PAD secondary to diabetes  Diabetic lower extremity ulcerations  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, MOSD  History of right renal mass  3.4 cm infrarenal abdominal aortic aneurysm  Valvular heart disease with significant mitral regurgitation  Paroxysmal atrial fibrillation  Hypercoagulable state secondary to atrial fibrillation  Chronic oral anticoagulation therapy with direct thrombin inhibition  ACD  Chronic systolic congestive heart failure  Chronic hypoxic respiratory failure  Supplemental oxygen dependency  Chronic mucopurulent bronchitis  History of prostate cancer  Morbid exogenous obesity  Hypoventilation apnea syndrome with ARTI  Peripheral polyneuropathy secondary to alcohol and diabetes  Autonomic neuropathy secondary to diabetes with autonomic dysfunction syndrome  Monoclonal paraproteinemia  Narcotic dependency  Vitamin D deficiency  ETOHISM  H/O pacemaker placement      Presenting Problem/History of Present Illness  Active Hospital Problems    Diagnosis  POA   • **SBO (small bowel obstruction) (HCC) [K56.609]  Yes   • Pneumoperitoneum [K66.8]  Yes   • Small bowel obstruction (HCC) [K56.609]  Yes   • Ventral hernia without obstruction or gangrene [K43.9]  Yes   • Generalized abdominal pain [R10.84]  Yes      Resolved Hospital Problems   No resolved problems to display.           Hospital Course  Patient is a 77 y.o. male who presented with an acute recurrent high-grade small bowel obstruction.  He was admitted and bowel rest was initiated with nasogastric decompression.  He improved and had spontaneous bowel movements and was able to tolerate enteral feeding.  He improved and was deemed stable for discharge home today with medications per the discharge reconciliation and will follow up with us in the office within 1 to 2 weeks time.  There were no other complications throughout the patient's hospital stay.  His ventral hernia remains inoperable and he is aware of this.  He will call with any decompensation and as always, may call me my personal cell phone number if needed    Procedures Performed         Consults:   Consults     Date and Time Order Name Status Description    12/8/2022  2:05 AM Inpatient General Surgery Consult      11/28/2022  9:47 AM Inpatient Nephrology Consult Completed     11/27/2022  4:26 PM Inpatient Cardiology Consult Completed     11/26/2022 11:14 AM Gastroenterology (on-call MD unless specified) Completed           Pertinent Test Results:CT Abdomen Pelvis Without Contrast    Result Date: 12/7/2022   1. High-grade small bowel obstruction with a transition point in the large ventral hernia in the region of the distal small bowel. 2. There is a small amount of pneumoperitoneum as well as pneumatosis in the small bowel in the right upper quadrant adjacent to prior suture line. This is best seen on axial image #102. There has been pneumatosis seen in these loops of small bowel on prior exams. 3. There is also significant stranding in the mesentery as well as fluid adjacent to these loops of small bowel. This is a new finding compared to the prior exams. 4. Together these findings are concerning for threatened loops of small bowel and recommend surgical consultation for further workup and management.    Electronically Signed By-Jc Camara MD On:12/7/2022 4:22  PM This report was finalized on [id number] by  Jc Camara MD.      Imaging Results (Last 7 Days)     Procedure Component Value Units Date/Time    CT Abdomen Pelvis Without Contrast [[id number]] Collected: 12/07/22 1602     Updated: 12/07/22 1624    Narrative:      CT ABDOMEN PELVIS WO CONTRAST-     Date of Exam: 12/7/2022 3:47 PM     Indication: distension, hx SOB, UGIB     Comparison: Several prior exams dating back to 2020.     Technique: Contiguous axial CT images were obtained from the lung bases  to the pubic symphysis without contrast. Sagittal and coronal  reconstructions were performed.  Automated exposure control and  iterative reconstruction methods were used.     FINDINGS:  Limited views of the lung bases demonstrates a trace right effusion and  small left effusion.     The liver is unremarkable. No intra or extrahepatic biliary duct  dilation is identified. Status post cholecystectomy. The pancreas is  unremarkable. The spleen is normal.     Bilateral adrenal glands are normal. There is a staghorn calculus  filling the left mid and lower pole collecting system. No evidence of  obstruction is identified. There is a low-density right renal lesion  measuring 2.6 x 1.7 cm which is likely an irregular cyst. The bladder is  unremarkable. Status post prostatectomy.     The colon is unremarkable.     There is a small amount of pneumoperitoneum. There is marked stranding  within the mesentery as well as scattered areas of adjacent loops of  small bowel. There are markedly dilated loops of proximal mid and distal  small bowel with a transition zone that is difficult to determine, but  appears to be in the large complex ventral hernia. The transition zone  appears to be in the region of the axial image #165.     No adenopathy. No aggressive appearing lytic or sclerotic bone lesions.  Mild aneurysmal dilation of the infrarenal abdominal aorta. No  aggressive appearing lytic or sclerotic bone lesions are  identified..          Impression:         1. High-grade small bowel obstruction with a transition point in the  large ventral hernia in the region of the distal small bowel.  2. There is a small amount of pneumoperitoneum as well as pneumatosis in  the small bowel in the right upper quadrant adjacent to prior suture  line. This is best seen on axial image #102. There has been pneumatosis  seen in these loops of small bowel on prior exams.  3. There is also significant stranding in the mesentery as well as fluid  adjacent to these loops of small bowel. This is a new finding compared  to the prior exams.  4. Together these findings are concerning for threatened loops of small  bowel and recommend surgical consultation for further workup and  management.           Electronically Signed By-Jc Camara MD On:12/7/2022 4:22 PM  This report was finalized on [id number] by  Jc Camara MD.              Condition on Discharge:  Fair    Vital Signs  Temp:  [97.4 °F (36.3 °C)-98 °F (36.7 °C)] 97.6 °F (36.4 °C)  Heart Rate:  [69-78] 74  Resp:  [16-20] 18  BP: (100-130)/(46-77) 111/57    Physical Exam:     General Appearance:    Alert, cooperative, in no acute distress   Head:    Normocephalic, without obvious abnormality, atraumatic   Eyes:           Conjunctivae and sclerae normal, no   icterus, no pallor, corneas clear, PERRLA   Throat:   No oral lesions, no thrush, oral mucosa moist   Neck:   No adenopathy, supple, trachea midline, no thyromegaly, no   carotid bruit, no JVD   Lungs:     Clear to auscultation,respirations regular, even and                  unlabored    Heart:    Regular rhythm and normal rate, normal S1 and S2, no            murmur, no gallop, no rub, no click   Chest Wall:    No abnormalities observed   Abdomen:     Normal bowel sounds, no masses, no organomegaly, soft        non-tender, non-distended, no guarding, no rebound                tenderness   Rectal:     Deferred   Extremities:   Moves all  extremities well, no edema, no cyanosis, no             redness   Pulses:   Pulses palpable and equal bilaterally   Skin:   No bleeding, bruising or rash   Lymph nodes:   No palpable adenopathy   Neurologic:   Cranial nerves 2 - 12 grossly intact, sensation intact, DTR       present and equal bilaterally         Discharge Disposition  Home or Self Care    Discharge Medications     Discharge Medications      Continue These Medications      Instructions Start Date   atorvastatin 40 MG tablet  Commonly known as: LIPITOR   40 mg, Oral, Every Morning      budesonide 0.5 MG/2ML nebulizer solution  Commonly known as: PULMICORT   0.5 mg, Nebulization, 2 Times Daily - RT      docusate sodium 100 MG capsule  Commonly known as: COLACE   200 mg, Oral, 2 Times Daily      Eliquis 5 MG tablet tablet  Generic drug: apixaban   TAKE ONE TABLET BY MOUTH TWICE A DAY      furosemide 40 MG tablet  Commonly known as: LASIX   40 mg, Oral, Every Morning      glimepiride 2 MG tablet  Commonly known as: AMARYL   2 mg, Oral, Daily      ipratropium-albuterol 0.5-2.5 mg/3 ml nebulizer  Commonly known as: DUO-NEB   3 mL, Nebulization, 4 Times Daily PRN      isosorbide mononitrate 120 MG 24 hr tablet  Commonly known as: IMDUR   120 mg, Oral, Every 24 Hours Scheduled      metOLazone 5 MG tablet  Commonly known as: ZAROXOLYN   5 mg, Oral, 3 Times Weekly, Monday, Wednesday, Friday    As needed for weight gain.      metoprolol tartrate 50 MG tablet  Commonly known as: LOPRESSOR   50 mg, Oral, 2 Times Daily With Meals      mirtazapine 30 MG tablet  Commonly known as: REMERON   30 mg, Oral, Nightly      nitroglycerin 0.4 MG SL tablet  Commonly known as: NITROSTAT   0.4 mg, Sublingual, Every 5 Minutes PRN      pregabalin 150 MG capsule  Commonly known as: LYRICA   150 mg, Oral, 2 Times Daily      ranolazine 1000 MG 12 hr tablet  Commonly known as: RANEXA   1,000 mg, Oral, Every 12 Hours Scheduled      rOPINIRole 1 MG tablet  Commonly known as: REQUIP   1  mg, Oral, Daily      sucralfate 1 g tablet  Commonly known as: CARAFATE   1 g, Oral, 4 Times Daily      Ventolin  (90 Base) MCG/ACT inhaler  Generic drug: albuterol sulfate HFA   2 puffs, Inhalation, Every 4 Hours PRN, May use every 4 to 6 hours      vitamin D 1.25 MG (35837 UT) capsule capsule  Commonly known as: ERGOCALCIFEROL   50,000 Units, Oral, Weekly, Friday             Discharge Diet:   Mechanical soft ADA 1800-calorie cardiac  Activity at Discharge:   As tolerated  Follow-up Appointments  Follow-up with us within 1 to 2 weeks time  Future Appointments   Date Time Provider Department Center   1/3/2023  3:00 PM Herberth Oconnor MD [facility]   3/16/2023 11:00 AM [facility] [place] DEVICE CHECK [facility]   3/16/2023 11:30 AM Celine Swanson MD [facility]         Test Results Pending at Discharge  Pending Labs     Order Current Status    Blood Culture - Blood, Arm, Left Preliminary result    Blood Culture - Blood, Arm, Left Preliminary result           Yusuf Jones MD  12/10/22  07:45 EST

## 2022-12-10 NOTE — NURSING NOTE
Placed a call out to ER about pt's belongings.  ER said they were going to transfer me to security but call ended.  Called security to see if they have anything for him.  They will call me back after they check the closet in ER.

## 2022-12-10 NOTE — PLAN OF CARE
Goal Outcome Evaluation:              Outcome Evaluation: Patient did well during the night.  Handling clears.  Dr. Jones in the morning moved patient up to full liquids and to advance to a GI diet a lunch if tolerates full liquids.  If tolerates well can DC this afternoon.  Stable at this time.

## 2022-12-11 NOTE — OUTREACH NOTE
Prep Survey    Flowsheet Row Responses   [facility] discharged from? [facility]   Is LACE score < 7 ? No   Emergency Room discharge w/ pulse ox? No   Eligibility Readm Mgmt   Discharge diagnosis High-grade SBO   Does the patient have one of the following disease processes/diagnoses(primary or secondary)? Other   Does the patient have Home health ordered? Yes   [facility]    Is there a DME ordered? No   Prep survey completed? Yes          ADRIEL SLAUGHTER - Registered Nurse

## 2022-12-12 NOTE — CASE MANAGEMENT/SOCIAL WORK
Case Management Discharge Note      Final Note: [facility].    Selected Continued Care - Discharged on 12/10/2022 Admission date: 12/7/2022 - Discharge disposition: Home or Self Care       Home Medical Care Coordination complete.    Service Provider Selected Services Address Phone Fax Patient Preferred    [facility] [address] [place], [place] [address] [phone] --           Transportation Services  Private: Car    Final Discharge Disposition Code: 06 - home with home health care

## 2022-12-13 NOTE — PROGRESS NOTES
"Enter Query Response Below      Query Response:       I disagree with the baseline creatinine as documented.  Discharge diagnoses will remain as documented       If applicable, please update the problem list.      Patient: Ro Nathan        : 1945  Account: [id number]           Admit Date: 2022        How to Respond to this query:       a. Click New Note     b. Answer query within the yellow box.                c. Update the Problem List, if applicable.      If you have any questions about this query contact me at:     Dr. Jones:     77 year old male admitted with a gastrointestinal bleed.  The nephrology progress notes include documentation of \"ARF/VANESSA\", and \"Baseline serum creatinine of 1.38\".  The discharge summary includes the diagnosis of acute renal failure/acute kidney injury.  Labs were monitored during his hospital stay and included creatinine levels of:     22 Creatinine 1.67  22 Creatinine 1.77  22 Creatinine 1.41  22 Creatinine 1.25  22 Creatinine 1.25    Medications administered during his hospital stay included IV normal saline infusions.      After study, was acute renal failure/acute kidney injury clinically supported during this admission?      Yes, please include additional clinical indicators:____________  No  Other- specify________  Unable to determine     [employer] KDIGO conference defines VANESSA as any of the following:   -Increase in creatinine level to > 1.5x baseline (historical or measure), which is known or presumed to have occurred within the prior 7 days; or   -Increase in creatinine > 0.3 mg/dl from a measured baseline within 48 hours or less; or   -Urine output < 0.5 ml/kg/hr for 6 hrs   When baseline creatinine is unknown, KDIGO advises: The lowest SCr obtained during a hospitalization is usually equal to or greater than the baseline. This SCr should be used to diagnose (and stage) VANESSA       By submitting this query, " we are merely seeking further clarification of documentation to accurately reflect all conditions that you are monitoring, evaluating, treating or that extend the hospitalization or utilize additional resources of care. Please utilize your independent clinical judgment when addressing the question(s) above.     This query and your response, once completed, will be entered into the legal medical record.    Sincerely,  Martha Flores RN,BSN    [email or web address]  Clinical Documentation Integrity Program

## 2022-12-16 NOTE — OUTREACH NOTE
"Medical Week 1 Survey    Flowsheet Row Responses   [facility] patient discharged from? [facility]   Does the patient have one of the following disease processes/diagnoses(primary or secondary)? Other   Week 1 attempt successful? Yes   Call start time 0815   Call end time 0818   Discharge diagnosis High-grade SBO   Person spoke with today (if not patient) and relationship Meghan-spouse   Meds reviewed with patient/caregiver? Yes   Is the patient having any side effects they believe may be caused by any medication additions or changes? No   Does the patient have all medications ordered at discharge? N/A   Prescription comments NO new medications   Is the patient taking all medications as directed (includes completed medication regime)? Yes   Medication comments Pt taking Glimepiride--wife reports he has had a few episodes of hypoglycemia, she responded with providing him some candy and his s/s improved.  Inquired about his BG readings and she reports she did not know as no glucometer--discussed purchasing and inexpensive one at [facility] or calling MD office for prescription--she v/u.   Does the patient have a primary care provider?  Yes   Does the patient have an appointment with their PCP within 7 days of discharge? Yes  [Wife reports patient has already completed hospital f/u at time of call]   Has the patient kept scheduled appointments due by today? Yes   What is the Home health agency?  [facility]    Has home health visited the patient within 72 hours of discharge? Unsure   Nursing interventions Reviewed instructions with patient  [With wife]   What is the patient's perception of their health status since discharge? Improving  [Wife reports he is doing \"better.\"  Reviewed return s/s--wife v/u.]   Is the patient/caregiver able to teach back signs and symptoms related to disease process for when to call PCP? Yes   Is the patient/caregiver able to teach back signs and symptoms related to disease " process for when to call 911? Yes   If the patient is a current smoker, are they able to teach back resources for cessation? Not a smoker   Week 1 call completed? Yes          LAURA PATEL - Registered Nurse

## 2023-01-01 ENCOUNTER — READMISSION MANAGEMENT (OUTPATIENT)
Dept: CALL CENTER | Facility: HOSPITAL | Age: 78
End: 2023-01-01
Payer: MEDICARE

## 2023-01-01 ENCOUNTER — APPOINTMENT (OUTPATIENT)
Dept: CT IMAGING | Facility: HOSPITAL | Age: 78
DRG: 388 | End: 2023-01-01
Payer: MEDICARE

## 2023-01-01 ENCOUNTER — OFFICE VISIT (OUTPATIENT)
Dept: PAIN MEDICINE | Facility: CLINIC | Age: 78
End: 2023-01-01
Payer: MEDICARE

## 2023-01-01 ENCOUNTER — HOSPITAL ENCOUNTER (OUTPATIENT)
Facility: HOSPITAL | Age: 78
Setting detail: OBSERVATION
LOS: 3 days | Discharge: HOME | End: 2023-02-10
Attending: EMERGENCY MEDICINE | Admitting: FAMILY MEDICINE
Payer: MEDICARE

## 2023-01-01 ENCOUNTER — HOSPITAL ENCOUNTER (EMERGENCY)
Facility: HOSPITAL | Age: 78
End: 2023-03-13
Attending: EMERGENCY MEDICINE | Admitting: EMERGENCY MEDICINE
Payer: MEDICARE

## 2023-01-01 ENCOUNTER — APPOINTMENT (OUTPATIENT)
Dept: CT IMAGING | Facility: HOSPITAL | Age: 78
End: 2023-01-01
Payer: MEDICARE

## 2023-01-01 ENCOUNTER — APPOINTMENT (OUTPATIENT)
Dept: GENERAL RADIOLOGY | Facility: HOSPITAL | Age: 78
End: 2023-01-01
Payer: MEDICARE

## 2023-01-01 ENCOUNTER — HOSPITAL ENCOUNTER (INPATIENT)
Facility: HOSPITAL | Age: 78
LOS: 2 days | Discharge: HOME | DRG: 388 | End: 2023-03-07
Attending: EMERGENCY MEDICINE | Admitting: INTERNAL MEDICINE
Payer: MEDICARE

## 2023-01-01 VITALS
TEMPERATURE: 98.6 F | SYSTOLIC BLOOD PRESSURE: 112 MMHG | DIASTOLIC BLOOD PRESSURE: 76 MMHG | OXYGEN SATURATION: 96 % | HEIGHT: 72 IN | RESPIRATION RATE: 20 BRPM | HEART RATE: 68 BPM | WEIGHT: 247.8 LBS | BODY MASS INDEX: 33.56 KG/M2

## 2023-01-01 VITALS
BODY MASS INDEX: 34.07 KG/M2 | HEART RATE: 71 BPM | HEIGHT: 73 IN | SYSTOLIC BLOOD PRESSURE: 115 MMHG | TEMPERATURE: 97.9 F | DIASTOLIC BLOOD PRESSURE: 64 MMHG | OXYGEN SATURATION: 97 % | WEIGHT: 257.06 LBS | RESPIRATION RATE: 18 BRPM

## 2023-01-01 VITALS
OXYGEN SATURATION: 94 % | DIASTOLIC BLOOD PRESSURE: 57 MMHG | HEART RATE: 74 BPM | SYSTOLIC BLOOD PRESSURE: 123 MMHG | RESPIRATION RATE: 16 BRPM

## 2023-01-01 VITALS — HEART RATE: 100 BPM

## 2023-01-01 DIAGNOSIS — M47.817 SPONDYLOSIS OF LUMBOSACRAL REGION WITHOUT MYELOPATHY OR RADICULOPATHY: ICD-10-CM

## 2023-01-01 DIAGNOSIS — M51.36 DDD (DEGENERATIVE DISC DISEASE), LUMBAR: ICD-10-CM

## 2023-01-01 DIAGNOSIS — G62.1 ALCOHOLIC POLYNEUROPATHY: ICD-10-CM

## 2023-01-01 DIAGNOSIS — M25.522 ELBOW PAIN, LEFT: ICD-10-CM

## 2023-01-01 DIAGNOSIS — I49.01 VENTRICULAR FIBRILLATION: Primary | ICD-10-CM

## 2023-01-01 DIAGNOSIS — M54.50 CHRONIC MIDLINE LOW BACK PAIN WITHOUT SCIATICA: Primary | ICD-10-CM

## 2023-01-01 DIAGNOSIS — K56.609 SMALL BOWEL OBSTRUCTION: Primary | ICD-10-CM

## 2023-01-01 DIAGNOSIS — M51.36 DEGENERATION OF LUMBAR INTERVERTEBRAL DISC: ICD-10-CM

## 2023-01-01 DIAGNOSIS — M47.27 OSTEOARTHRITIS OF SPINE WITH RADICULOPATHY, LUMBOSACRAL REGION: ICD-10-CM

## 2023-01-01 DIAGNOSIS — G89.29 CHRONIC MIDLINE LOW BACK PAIN WITHOUT SCIATICA: Primary | ICD-10-CM

## 2023-01-01 DIAGNOSIS — R11.2 NAUSEA AND VOMITING, UNSPECIFIED VOMITING TYPE: ICD-10-CM

## 2023-01-01 DIAGNOSIS — M54.16 LUMBAR RADICULOPATHY: ICD-10-CM

## 2023-01-01 DIAGNOSIS — R10.84 GENERALIZED ABDOMINAL PAIN: ICD-10-CM

## 2023-01-01 LAB
ALBUMIN SERPL-MCNC: 3.8 G/DL (ref 3.5–5.2)
ALBUMIN SERPL-MCNC: 4.3 G/DL (ref 3.5–5.2)
ALBUMIN/GLOB SERPL: 0.9 G/DL
ALBUMIN/GLOB SERPL: 1.1 G/DL
ALP SERPL-CCNC: 153 U/L (ref 39–117)
ALP SERPL-CCNC: 172 U/L (ref 39–117)
ALT SERPL W P-5'-P-CCNC: 5 U/L (ref 1–41)
ALT SERPL W P-5'-P-CCNC: 7 U/L (ref 1–41)
ANION GAP SERPL CALCULATED.3IONS-SCNC: 10 MMOL/L (ref 5–15)
ANION GAP SERPL CALCULATED.3IONS-SCNC: 11 MMOL/L (ref 5–15)
ANION GAP SERPL CALCULATED.3IONS-SCNC: 13 MMOL/L (ref 5–15)
ANION GAP SERPL CALCULATED.3IONS-SCNC: 14 MMOL/L (ref 5–15)
ANION GAP SERPL CALCULATED.3IONS-SCNC: 15 MMOL/L (ref 5–15)
ANION GAP SERPL CALCULATED.3IONS-SCNC: 8 MMOL/L (ref 5–15)
ANION GAP SERPL CALCULATED.3IONS-SCNC: 9 MMOL/L (ref 5–15)
ANISOCYTOSIS BLD QL: ABNORMAL
AST SERPL-CCNC: 15 U/L (ref 1–40)
AST SERPL-CCNC: 15 U/L (ref 1–40)
BACTERIA SPEC AEROBE CULT: NO GROWTH
BACTERIA SPEC AEROBE CULT: NORMAL
BACTERIA SPEC AEROBE CULT: NORMAL
BACTERIA UR QL AUTO: ABNORMAL /HPF
BASOPHILS # BLD AUTO: 0.1 10*3/MM3 (ref 0–0.2)
BASOPHILS # BLD MANUAL: 0.14 10*3/MM3 (ref 0–0.2)
BASOPHILS NFR BLD AUTO: 0.6 % (ref 0–1.5)
BASOPHILS NFR BLD AUTO: 0.7 % (ref 0–1.5)
BASOPHILS NFR BLD AUTO: 1 % (ref 0–1.5)
BASOPHILS NFR BLD AUTO: 1.3 % (ref 0–1.5)
BASOPHILS NFR BLD MANUAL: 1 % (ref 0–1.5)
BILIRUB SERPL-MCNC: 0.5 MG/DL (ref 0–1.2)
BILIRUB SERPL-MCNC: 0.6 MG/DL (ref 0–1.2)
BILIRUB UR QL STRIP: ABNORMAL
BUN SERPL-MCNC: 34 MG/DL (ref 8–23)
BUN SERPL-MCNC: 41 MG/DL (ref 8–23)
BUN SERPL-MCNC: 42 MG/DL (ref 8–23)
BUN SERPL-MCNC: 44 MG/DL (ref 8–23)
BUN SERPL-MCNC: 53 MG/DL (ref 8–23)
BUN SERPL-MCNC: 54 MG/DL (ref 8–23)
BUN SERPL-MCNC: 55 MG/DL (ref 8–23)
BUN/CREAT SERPL: 17.3 (ref 7–25)
BUN/CREAT SERPL: 21.6 (ref 7–25)
BUN/CREAT SERPL: 21.8 (ref 7–25)
BUN/CREAT SERPL: 22 (ref 7–25)
BUN/CREAT SERPL: 23.3 (ref 7–25)
BUN/CREAT SERPL: 23.9 (ref 7–25)
BUN/CREAT SERPL: 28.8 (ref 7–25)
CALCIUM SPEC-SCNC: 10 MG/DL (ref 8.6–10.5)
CALCIUM SPEC-SCNC: 7.8 MG/DL (ref 8.6–10.5)
CALCIUM SPEC-SCNC: 8.1 MG/DL (ref 8.6–10.5)
CALCIUM SPEC-SCNC: 8.6 MG/DL (ref 8.6–10.5)
CALCIUM SPEC-SCNC: 9 MG/DL (ref 8.6–10.5)
CALCIUM SPEC-SCNC: 9.2 MG/DL (ref 8.6–10.5)
CALCIUM SPEC-SCNC: 9.7 MG/DL (ref 8.6–10.5)
CHLORIDE SERPL-SCNC: 89 MMOL/L (ref 98–107)
CHLORIDE SERPL-SCNC: 89 MMOL/L (ref 98–107)
CHLORIDE SERPL-SCNC: 94 MMOL/L (ref 98–107)
CHLORIDE SERPL-SCNC: 95 MMOL/L (ref 98–107)
CHLORIDE SERPL-SCNC: 95 MMOL/L (ref 98–107)
CHLORIDE SERPL-SCNC: 97 MMOL/L (ref 98–107)
CHLORIDE SERPL-SCNC: 97 MMOL/L (ref 98–107)
CLARITY UR: CLEAR
CO2 SERPL-SCNC: 26 MMOL/L (ref 22–29)
CO2 SERPL-SCNC: 29 MMOL/L (ref 22–29)
CO2 SERPL-SCNC: 30 MMOL/L (ref 22–29)
CO2 SERPL-SCNC: 32 MMOL/L (ref 22–29)
CO2 SERPL-SCNC: 32 MMOL/L (ref 22–29)
CO2 SERPL-SCNC: 34 MMOL/L (ref 22–29)
CO2 SERPL-SCNC: 38 MMOL/L (ref 22–29)
COLOR UR: ABNORMAL
CREAT SERPL-MCNC: 1.76 MG/DL (ref 0.76–1.27)
CREAT SERPL-MCNC: 1.84 MG/DL (ref 0.76–1.27)
CREAT SERPL-MCNC: 1.89 MG/DL (ref 0.76–1.27)
CREAT SERPL-MCNC: 1.9 MG/DL (ref 0.76–1.27)
CREAT SERPL-MCNC: 1.96 MG/DL (ref 0.76–1.27)
CREAT SERPL-MCNC: 2.48 MG/DL (ref 0.76–1.27)
CREAT SERPL-MCNC: 2.5 MG/DL (ref 0.76–1.27)
D-LACTATE SERPL-SCNC: 1 MMOL/L (ref 0.3–2)
DEPRECATED RDW RBC AUTO: 58.6 FL (ref 37–54)
DEPRECATED RDW RBC AUTO: 59.5 FL (ref 37–54)
DEPRECATED RDW RBC AUTO: 59.5 FL (ref 37–54)
DEPRECATED RDW RBC AUTO: 59.9 FL (ref 37–54)
DEPRECATED RDW RBC AUTO: 60.4 FL (ref 37–54)
DEPRECATED RDW RBC AUTO: 61.7 FL (ref 37–54)
DEPRECATED RDW RBC AUTO: 63 FL (ref 37–54)
EGFRCR SERPLBLD CKD-EPI 2021: 26 ML/MIN/1.73
EGFRCR SERPLBLD CKD-EPI 2021: 26.2 ML/MIN/1.73
EGFRCR SERPLBLD CKD-EPI 2021: 34.8 ML/MIN/1.73
EGFRCR SERPLBLD CKD-EPI 2021: 36.1 ML/MIN/1.73
EGFRCR SERPLBLD CKD-EPI 2021: 36.3 ML/MIN/1.73
EGFRCR SERPLBLD CKD-EPI 2021: 37.5 ML/MIN/1.73
EGFRCR SERPLBLD CKD-EPI 2021: 39.6 ML/MIN/1.73
ELLIPTOCYTES BLD QL SMEAR: ABNORMAL
EOSINOPHIL # BLD AUTO: 0 10*3/MM3 (ref 0–0.4)
EOSINOPHIL # BLD AUTO: 0.2 10*3/MM3 (ref 0–0.4)
EOSINOPHIL # BLD AUTO: 0.2 10*3/MM3 (ref 0–0.4)
EOSINOPHIL # BLD AUTO: 0.3 10*3/MM3 (ref 0–0.4)
EOSINOPHIL # BLD AUTO: 0.3 10*3/MM3 (ref 0–0.4)
EOSINOPHIL # BLD AUTO: 0.6 10*3/MM3 (ref 0–0.4)
EOSINOPHIL NFR BLD AUTO: 0.4 % (ref 0.3–6.2)
EOSINOPHIL NFR BLD AUTO: 1.9 % (ref 0.3–6.2)
EOSINOPHIL NFR BLD AUTO: 2.1 % (ref 0.3–6.2)
EOSINOPHIL NFR BLD AUTO: 4.2 % (ref 0.3–6.2)
EOSINOPHIL NFR BLD AUTO: 4.7 % (ref 0.3–6.2)
EOSINOPHIL NFR BLD AUTO: 8.6 % (ref 0.3–6.2)
ERYTHROCYTE [DISTWIDTH] IN BLOOD BY AUTOMATED COUNT: 21.2 % (ref 12.3–15.4)
ERYTHROCYTE [DISTWIDTH] IN BLOOD BY AUTOMATED COUNT: 21.3 % (ref 12.3–15.4)
ERYTHROCYTE [DISTWIDTH] IN BLOOD BY AUTOMATED COUNT: 21.3 % (ref 12.3–15.4)
ERYTHROCYTE [DISTWIDTH] IN BLOOD BY AUTOMATED COUNT: 21.7 % (ref 12.3–15.4)
ERYTHROCYTE [DISTWIDTH] IN BLOOD BY AUTOMATED COUNT: 21.9 % (ref 12.3–15.4)
ERYTHROCYTE [DISTWIDTH] IN BLOOD BY AUTOMATED COUNT: 22.4 % (ref 12.3–15.4)
ERYTHROCYTE [DISTWIDTH] IN BLOOD BY AUTOMATED COUNT: 22.6 % (ref 12.3–15.4)
GLOBULIN UR ELPH-MCNC: 3.8 GM/DL
GLOBULIN UR ELPH-MCNC: 4.3 GM/DL
GLUCOSE BLDC GLUCOMTR-MCNC: 109 MG/DL (ref 70–105)
GLUCOSE BLDC GLUCOMTR-MCNC: 111 MG/DL (ref 70–105)
GLUCOSE BLDC GLUCOMTR-MCNC: 116 MG/DL (ref 70–105)
GLUCOSE BLDC GLUCOMTR-MCNC: 118 MG/DL (ref 70–105)
GLUCOSE BLDC GLUCOMTR-MCNC: 119 MG/DL (ref 70–105)
GLUCOSE BLDC GLUCOMTR-MCNC: 120 MG/DL (ref 70–105)
GLUCOSE BLDC GLUCOMTR-MCNC: 122 MG/DL (ref 70–105)
GLUCOSE BLDC GLUCOMTR-MCNC: 123 MG/DL (ref 70–105)
GLUCOSE BLDC GLUCOMTR-MCNC: 135 MG/DL (ref 70–105)
GLUCOSE BLDC GLUCOMTR-MCNC: 136 MG/DL (ref 70–105)
GLUCOSE BLDC GLUCOMTR-MCNC: 140 MG/DL (ref 70–105)
GLUCOSE BLDC GLUCOMTR-MCNC: 143 MG/DL (ref 70–105)
GLUCOSE BLDC GLUCOMTR-MCNC: 144 MG/DL (ref 70–105)
GLUCOSE BLDC GLUCOMTR-MCNC: 149 MG/DL (ref 70–105)
GLUCOSE BLDC GLUCOMTR-MCNC: 161 MG/DL (ref 70–105)
GLUCOSE BLDC GLUCOMTR-MCNC: 161 MG/DL (ref 70–105)
GLUCOSE BLDC GLUCOMTR-MCNC: 167 MG/DL (ref 70–105)
GLUCOSE BLDC GLUCOMTR-MCNC: 169 MG/DL (ref 70–105)
GLUCOSE BLDC GLUCOMTR-MCNC: 186 MG/DL (ref 70–105)
GLUCOSE BLDC GLUCOMTR-MCNC: 191 MG/DL (ref 70–105)
GLUCOSE BLDC GLUCOMTR-MCNC: 192 MG/DL (ref 70–105)
GLUCOSE BLDC GLUCOMTR-MCNC: 220 MG/DL (ref 70–105)
GLUCOSE BLDC GLUCOMTR-MCNC: 77 MG/DL (ref 70–105)
GLUCOSE BLDC GLUCOMTR-MCNC: 78 MG/DL (ref 70–105)
GLUCOSE BLDC GLUCOMTR-MCNC: 88 MG/DL (ref 70–105)
GLUCOSE BLDC GLUCOMTR-MCNC: 97 MG/DL (ref 70–105)
GLUCOSE SERPL-MCNC: 116 MG/DL (ref 65–99)
GLUCOSE SERPL-MCNC: 119 MG/DL (ref 65–99)
GLUCOSE SERPL-MCNC: 137 MG/DL (ref 65–99)
GLUCOSE SERPL-MCNC: 201 MG/DL (ref 65–99)
GLUCOSE SERPL-MCNC: 205 MG/DL (ref 65–99)
GLUCOSE SERPL-MCNC: 233 MG/DL (ref 65–99)
GLUCOSE SERPL-MCNC: 235 MG/DL (ref 65–99)
GLUCOSE UR STRIP-MCNC: NEGATIVE MG/DL
HCT VFR BLD AUTO: 30.9 % (ref 37.5–51)
HCT VFR BLD AUTO: 32.3 % (ref 37.5–51)
HCT VFR BLD AUTO: 32.8 % (ref 37.5–51)
HCT VFR BLD AUTO: 36.7 % (ref 37.5–51)
HCT VFR BLD AUTO: 37.3 % (ref 37.5–51)
HCT VFR BLD AUTO: 38.8 % (ref 37.5–51)
HCT VFR BLD AUTO: 39.3 % (ref 37.5–51)
HGB BLD-MCNC: 10 G/DL (ref 13–17.7)
HGB BLD-MCNC: 10.5 G/DL (ref 13–17.7)
HGB BLD-MCNC: 11.3 G/DL (ref 13–17.7)
HGB BLD-MCNC: 11.6 G/DL (ref 13–17.7)
HGB BLD-MCNC: 12 G/DL (ref 13–17.7)
HGB BLD-MCNC: 12.3 G/DL (ref 13–17.7)
HGB BLD-MCNC: 9.1 G/DL (ref 13–17.7)
HGB UR QL STRIP.AUTO: NEGATIVE
HYALINE CASTS UR QL AUTO: ABNORMAL /LPF
KETONES UR QL STRIP: ABNORMAL
LEUKOCYTE ESTERASE UR QL STRIP.AUTO: ABNORMAL
LIPASE SERPL-CCNC: 18 U/L (ref 13–60)
LIPASE SERPL-CCNC: 9 U/L (ref 13–60)
LYMPHOCYTES # BLD AUTO: 0.6 10*3/MM3 (ref 0.7–3.1)
LYMPHOCYTES # BLD AUTO: 0.9 10*3/MM3 (ref 0.7–3.1)
LYMPHOCYTES # BLD AUTO: 0.9 10*3/MM3 (ref 0.7–3.1)
LYMPHOCYTES # BLD AUTO: 1 10*3/MM3 (ref 0.7–3.1)
LYMPHOCYTES # BLD AUTO: 1.3 10*3/MM3 (ref 0.7–3.1)
LYMPHOCYTES # BLD AUTO: 1.3 10*3/MM3 (ref 0.7–3.1)
LYMPHOCYTES # BLD MANUAL: 0.82 10*3/MM3 (ref 0.7–3.1)
LYMPHOCYTES NFR BLD AUTO: 10.2 % (ref 19.6–45.3)
LYMPHOCYTES NFR BLD AUTO: 16.6 % (ref 19.6–45.3)
LYMPHOCYTES NFR BLD AUTO: 18.2 % (ref 19.6–45.3)
LYMPHOCYTES NFR BLD AUTO: 18.4 % (ref 19.6–45.3)
LYMPHOCYTES NFR BLD AUTO: 5.9 % (ref 19.6–45.3)
LYMPHOCYTES NFR BLD AUTO: 9 % (ref 19.6–45.3)
LYMPHOCYTES NFR BLD MANUAL: 4 % (ref 5–12)
MAGNESIUM SERPL-MCNC: 2.3 MG/DL (ref 1.6–2.4)
MCH RBC QN AUTO: 22.7 PG (ref 26.6–33)
MCH RBC QN AUTO: 23.1 PG (ref 26.6–33)
MCH RBC QN AUTO: 23.3 PG (ref 26.6–33)
MCH RBC QN AUTO: 23.7 PG (ref 26.6–33)
MCH RBC QN AUTO: 23.7 PG (ref 26.6–33)
MCH RBC QN AUTO: 23.8 PG (ref 26.6–33)
MCH RBC QN AUTO: 25.1 PG (ref 26.6–33)
MCHC RBC AUTO-ENTMCNC: 29.6 G/DL (ref 31.5–35.7)
MCHC RBC AUTO-ENTMCNC: 30.5 G/DL (ref 31.5–35.7)
MCHC RBC AUTO-ENTMCNC: 30.6 G/DL (ref 31.5–35.7)
MCHC RBC AUTO-ENTMCNC: 31 G/DL (ref 31.5–35.7)
MCHC RBC AUTO-ENTMCNC: 31.1 G/DL (ref 31.5–35.7)
MCHC RBC AUTO-ENTMCNC: 31.3 G/DL (ref 31.5–35.7)
MCHC RBC AUTO-ENTMCNC: 32.6 G/DL (ref 31.5–35.7)
MCV RBC AUTO: 75.2 FL (ref 79–97)
MCV RBC AUTO: 75.8 FL (ref 79–97)
MCV RBC AUTO: 76 FL (ref 79–97)
MCV RBC AUTO: 76.7 FL (ref 79–97)
MCV RBC AUTO: 76.8 FL (ref 79–97)
MCV RBC AUTO: 77.2 FL (ref 79–97)
MCV RBC AUTO: 77.2 FL (ref 79–97)
METAMYELOCYTES NFR BLD MANUAL: 1 % (ref 0–0)
MONOCYTES # BLD AUTO: 0.8 10*3/MM3 (ref 0.1–0.9)
MONOCYTES # BLD AUTO: 0.8 10*3/MM3 (ref 0.1–0.9)
MONOCYTES # BLD AUTO: 0.9 10*3/MM3 (ref 0.1–0.9)
MONOCYTES # BLD AUTO: 0.9 10*3/MM3 (ref 0.1–0.9)
MONOCYTES # BLD AUTO: 1.2 10*3/MM3 (ref 0.1–0.9)
MONOCYTES # BLD AUTO: 1.2 10*3/MM3 (ref 0.1–0.9)
MONOCYTES # BLD: 0.54 10*3/MM3 (ref 0.1–0.9)
MONOCYTES NFR BLD AUTO: 11.7 % (ref 5–12)
MONOCYTES NFR BLD AUTO: 12.1 % (ref 5–12)
MONOCYTES NFR BLD AUTO: 13 % (ref 5–12)
MONOCYTES NFR BLD AUTO: 13.1 % (ref 5–12)
MONOCYTES NFR BLD AUTO: 13.2 % (ref 5–12)
MONOCYTES NFR BLD AUTO: 7.4 % (ref 5–12)
NEUTROPHILS # BLD AUTO: 11.97 10*3/MM3 (ref 1.7–7)
NEUTROPHILS NFR BLD AUTO: 4 10*3/MM3 (ref 1.7–7)
NEUTROPHILS NFR BLD AUTO: 4.4 10*3/MM3 (ref 1.7–7)
NEUTROPHILS NFR BLD AUTO: 4.5 10*3/MM3 (ref 1.7–7)
NEUTROPHILS NFR BLD AUTO: 6.6 10*3/MM3 (ref 1.7–7)
NEUTROPHILS NFR BLD AUTO: 60.2 % (ref 42.7–76)
NEUTROPHILS NFR BLD AUTO: 63.2 % (ref 42.7–76)
NEUTROPHILS NFR BLD AUTO: 64.7 % (ref 42.7–76)
NEUTROPHILS NFR BLD AUTO: 7.9 10*3/MM3 (ref 1.7–7)
NEUTROPHILS NFR BLD AUTO: 73.6 % (ref 42.7–76)
NEUTROPHILS NFR BLD AUTO: 77.9 % (ref 42.7–76)
NEUTROPHILS NFR BLD AUTO: 8.6 10*3/MM3 (ref 1.7–7)
NEUTROPHILS NFR BLD AUTO: 84.1 % (ref 42.7–76)
NEUTROPHILS NFR BLD MANUAL: 75 % (ref 42.7–76)
NEUTS BAND NFR BLD MANUAL: 13 % (ref 0–5)
NEUTS VAC BLD QL SMEAR: ABNORMAL
NITRITE UR QL STRIP: NEGATIVE
NRBC BLD AUTO-RTO: 0 /100 WBC (ref 0–0.2)
NRBC BLD AUTO-RTO: 0 /100 WBC (ref 0–0.2)
NRBC BLD AUTO-RTO: 0.1 /100 WBC (ref 0–0.2)
PH UR STRIP.AUTO: <=5 [PH] (ref 5–8)
PLAT MORPH BLD: NORMAL
PLATELET # BLD AUTO: 330 10*3/MM3 (ref 140–450)
PLATELET # BLD AUTO: 389 10*3/MM3 (ref 140–450)
PLATELET # BLD AUTO: 390 10*3/MM3 (ref 140–450)
PLATELET # BLD AUTO: 511 10*3/MM3 (ref 140–450)
PLATELET # BLD AUTO: 518 10*3/MM3 (ref 140–450)
PLATELET # BLD AUTO: 543 10*3/MM3 (ref 140–450)
PLATELET # BLD AUTO: 544 10*3/MM3 (ref 140–450)
PMV BLD AUTO: 7.9 FL (ref 6–12)
PMV BLD AUTO: 8.1 FL (ref 6–12)
PMV BLD AUTO: 8.2 FL (ref 6–12)
PMV BLD AUTO: 8.3 FL (ref 6–12)
PMV BLD AUTO: 8.4 FL (ref 6–12)
PMV BLD AUTO: 8.4 FL (ref 6–12)
PMV BLD AUTO: 8.5 FL (ref 6–12)
POIKILOCYTOSIS BLD QL SMEAR: ABNORMAL
POTASSIUM SERPL-SCNC: 3.5 MMOL/L (ref 3.5–5.2)
POTASSIUM SERPL-SCNC: 3.9 MMOL/L (ref 3.5–5.2)
POTASSIUM SERPL-SCNC: 4.3 MMOL/L (ref 3.5–5.2)
POTASSIUM SERPL-SCNC: 4.5 MMOL/L (ref 3.5–5.2)
POTASSIUM SERPL-SCNC: 4.6 MMOL/L (ref 3.5–5.2)
POTASSIUM SERPL-SCNC: 4.6 MMOL/L (ref 3.5–5.2)
POTASSIUM SERPL-SCNC: 4.9 MMOL/L (ref 3.5–5.2)
PROT SERPL-MCNC: 8.1 G/DL (ref 6–8.5)
PROT SERPL-MCNC: 8.1 G/DL (ref 6–8.5)
PROT UR QL STRIP: ABNORMAL
QT INTERVAL: 465 MS
RBC # BLD AUTO: 4.03 10*6/MM3 (ref 4.14–5.8)
RBC # BLD AUTO: 4.19 10*6/MM3 (ref 4.14–5.8)
RBC # BLD AUTO: 4.31 10*6/MM3 (ref 4.14–5.8)
RBC # BLD AUTO: 4.76 10*6/MM3 (ref 4.14–5.8)
RBC # BLD AUTO: 4.86 10*6/MM3 (ref 4.14–5.8)
RBC # BLD AUTO: 5.16 10*6/MM3 (ref 4.14–5.8)
RBC # BLD AUTO: 5.19 10*6/MM3 (ref 4.14–5.8)
RBC # UR STRIP: ABNORMAL /HPF
REF LAB TEST METHOD: ABNORMAL
SCAN SLIDE: NORMAL
SODIUM SERPL-SCNC: 133 MMOL/L (ref 136–145)
SODIUM SERPL-SCNC: 133 MMOL/L (ref 136–145)
SODIUM SERPL-SCNC: 134 MMOL/L (ref 136–145)
SODIUM SERPL-SCNC: 136 MMOL/L (ref 136–145)
SODIUM SERPL-SCNC: 137 MMOL/L (ref 136–145)
SODIUM SERPL-SCNC: 140 MMOL/L (ref 136–145)
SODIUM SERPL-SCNC: 144 MMOL/L (ref 136–145)
SP GR UR STRIP: 1.02 (ref 1–1.03)
SQUAMOUS #/AREA URNS HPF: ABNORMAL /HPF
TOXIC GRANULATION: ABNORMAL
TROPONIN T SERPL-MCNC: 0.01 NG/ML (ref 0–0.03)
UROBILINOGEN UR QL STRIP: ABNORMAL
VARIANT LYMPHS NFR BLD MANUAL: 2 % (ref 0–5)
VARIANT LYMPHS NFR BLD MANUAL: 4 % (ref 19.6–45.3)
WBC # UR STRIP: ABNORMAL /HPF
WBC NRBC COR # BLD: 10.2 10*3/MM3 (ref 3.4–10.8)
WBC NRBC COR # BLD: 10.2 10*3/MM3 (ref 3.4–10.8)
WBC NRBC COR # BLD: 13.6 10*3/MM3 (ref 3.4–10.8)
WBC NRBC COR # BLD: 6.2 10*3/MM3 (ref 3.4–10.8)
WBC NRBC COR # BLD: 7 10*3/MM3 (ref 3.4–10.8)
WBC NRBC COR # BLD: 7.4 10*3/MM3 (ref 3.4–10.8)
WBC NRBC COR # BLD: 9 10*3/MM3 (ref 3.4–10.8)

## 2023-01-01 PROCEDURE — 99232 SBSQ HOSP IP/OBS MODERATE 35: CPT | Performed by: SURGERY

## 2023-01-01 PROCEDURE — 1125F AMNT PAIN NOTED PAIN PRSNT: CPT | Performed by: PHYSICAL MEDICINE & REHABILITATION

## 2023-01-01 PROCEDURE — 25010000002 MAGNESIUM SULFATE IN D5W 1G/100ML (PREMIX) 1-5 GM/100ML-% SOLUTION: Performed by: EMERGENCY MEDICINE

## 2023-01-01 PROCEDURE — G0378 HOSPITAL OBSERVATION PER HR: HCPCS

## 2023-01-01 PROCEDURE — 25010000002 PROMETHAZINE PER 50 MG

## 2023-01-01 PROCEDURE — 1160F RVW MEDS BY RX/DR IN RCRD: CPT | Performed by: PHYSICAL MEDICINE & REHABILITATION

## 2023-01-01 PROCEDURE — 85025 COMPLETE CBC W/AUTO DIFF WBC: CPT | Performed by: NURSE PRACTITIONER

## 2023-01-01 PROCEDURE — 80048 BASIC METABOLIC PNL TOTAL CA: CPT | Performed by: NURSE PRACTITIONER

## 2023-01-01 PROCEDURE — 74176 CT ABD & PELVIS W/O CONTRAST: CPT

## 2023-01-01 PROCEDURE — 99284 EMERGENCY DEPT VISIT MOD MDM: CPT

## 2023-01-01 PROCEDURE — 85007 BL SMEAR W/DIFF WBC COUNT: CPT

## 2023-01-01 PROCEDURE — 1159F MED LIST DOCD IN RCRD: CPT | Performed by: PHYSICAL MEDICINE & REHABILITATION

## 2023-01-01 PROCEDURE — 25010000002 AMIODARONE PER 30 MG: Performed by: EMERGENCY MEDICINE

## 2023-01-01 PROCEDURE — 82962 GLUCOSE BLOOD TEST: CPT

## 2023-01-01 PROCEDURE — 94640 AIRWAY INHALATION TREATMENT: CPT

## 2023-01-01 PROCEDURE — 94664 DEMO&/EVAL PT USE INHALER: CPT

## 2023-01-01 PROCEDURE — 80053 COMPREHEN METABOLIC PANEL: CPT

## 2023-01-01 PROCEDURE — 25010000002 EPINEPHRINE 1 MG/10ML SOLUTION PREFILLED SYRINGE: Performed by: EMERGENCY MEDICINE

## 2023-01-01 PROCEDURE — 96361 HYDRATE IV INFUSION ADD-ON: CPT

## 2023-01-01 PROCEDURE — 25010000002 ENOXAPARIN PER 10 MG: Performed by: NURSE PRACTITIONER

## 2023-01-01 PROCEDURE — 94761 N-INVAS EAR/PLS OXIMETRY MLT: CPT

## 2023-01-01 PROCEDURE — 83735 ASSAY OF MAGNESIUM: CPT

## 2023-01-01 PROCEDURE — 25010000002 HYDROMORPHONE 1 MG/ML SOLUTION: Performed by: FAMILY MEDICINE

## 2023-01-01 PROCEDURE — 94799 UNLISTED PULMONARY SVC/PX: CPT

## 2023-01-01 PROCEDURE — 63710000001 INSULIN LISPRO (HUMAN) PER 5 UNITS

## 2023-01-01 PROCEDURE — 96376 TX/PRO/DX INJ SAME DRUG ADON: CPT

## 2023-01-01 PROCEDURE — 83605 ASSAY OF LACTIC ACID: CPT

## 2023-01-01 PROCEDURE — 25010000002 HYDROMORPHONE 1 MG/ML SOLUTION: Performed by: NURSE PRACTITIONER

## 2023-01-01 PROCEDURE — 96374 THER/PROPH/DIAG INJ IV PUSH: CPT

## 2023-01-01 PROCEDURE — 85025 COMPLETE CBC W/AUTO DIFF WBC: CPT

## 2023-01-01 PROCEDURE — 25010000002 ONDANSETRON PER 1 MG: Performed by: NURSE PRACTITIONER

## 2023-01-01 PROCEDURE — 92950 HEART/LUNG RESUSCITATION CPR: CPT

## 2023-01-01 PROCEDURE — 97162 PT EVAL MOD COMPLEX 30 MIN: CPT

## 2023-01-01 PROCEDURE — 25010000002 HYDROMORPHONE 1 MG/ML SOLUTION

## 2023-01-01 PROCEDURE — 96375 TX/PRO/DX INJ NEW DRUG ADDON: CPT

## 2023-01-01 PROCEDURE — 81001 URINALYSIS AUTO W/SCOPE: CPT

## 2023-01-01 PROCEDURE — 80053 COMPREHEN METABOLIC PANEL: CPT | Performed by: EMERGENCY MEDICINE

## 2023-01-01 PROCEDURE — 99231 SBSQ HOSP IP/OBS SF/LOW 25: CPT | Performed by: SURGERY

## 2023-01-01 PROCEDURE — 36415 COLL VENOUS BLD VENIPUNCTURE: CPT | Performed by: NURSE PRACTITIONER

## 2023-01-01 PROCEDURE — 87086 URINE CULTURE/COLONY COUNT: CPT

## 2023-01-01 PROCEDURE — 96372 THER/PROPH/DIAG INJ SC/IM: CPT

## 2023-01-01 PROCEDURE — 25010000002 ONDANSETRON PER 1 MG

## 2023-01-01 PROCEDURE — 36415 COLL VENOUS BLD VENIPUNCTURE: CPT

## 2023-01-01 PROCEDURE — 85025 COMPLETE CBC W/AUTO DIFF WBC: CPT | Performed by: EMERGENCY MEDICINE

## 2023-01-01 PROCEDURE — 84484 ASSAY OF TROPONIN QUANT: CPT

## 2023-01-01 PROCEDURE — 25010000002 METHYLPREDNISOLONE PER 40 MG: Performed by: FAMILY MEDICINE

## 2023-01-01 PROCEDURE — 83690 ASSAY OF LIPASE: CPT

## 2023-01-01 PROCEDURE — 99213 OFFICE O/P EST LOW 20 MIN: CPT | Performed by: PHYSICAL MEDICINE & REHABILITATION

## 2023-01-01 PROCEDURE — 99221 1ST HOSP IP/OBS SF/LOW 40: CPT | Performed by: NURSE PRACTITIONER

## 2023-01-01 PROCEDURE — 25010000002 METOCLOPRAMIDE PER 10 MG: Performed by: NURSE PRACTITIONER

## 2023-01-01 PROCEDURE — 25010000002 CEFTRIAXONE PER 250 MG: Performed by: NURSE PRACTITIONER

## 2023-01-01 PROCEDURE — 99223 1ST HOSP IP/OBS HIGH 75: CPT | Performed by: SURGERY

## 2023-01-01 PROCEDURE — 83690 ASSAY OF LIPASE: CPT | Performed by: EMERGENCY MEDICINE

## 2023-01-01 PROCEDURE — 25010000002 FUROSEMIDE PER 20 MG: Performed by: FAMILY MEDICINE

## 2023-01-01 PROCEDURE — 63710000001 INSULIN LISPRO (HUMAN) PER 5 UNITS: Performed by: NURSE PRACTITIONER

## 2023-01-01 PROCEDURE — 87040 BLOOD CULTURE FOR BACTERIA: CPT

## 2023-01-01 PROCEDURE — 99285 EMERGENCY DEPT VISIT HI MDM: CPT

## 2023-01-01 PROCEDURE — 25010000002 ONDANSETRON PER 1 MG: Performed by: EMERGENCY MEDICINE

## 2023-01-01 PROCEDURE — 71045 X-RAY EXAM CHEST 1 VIEW: CPT

## 2023-01-01 PROCEDURE — 93005 ELECTROCARDIOGRAM TRACING: CPT

## 2023-01-01 PROCEDURE — 31500 INSERT EMERGENCY AIRWAY: CPT

## 2023-01-01 PROCEDURE — 25010000002 HYDROMORPHONE 1 MG/ML SOLUTION: Performed by: EMERGENCY MEDICINE

## 2023-01-01 RX ORDER — AMOXICILLIN 250 MG
2 CAPSULE ORAL 2 TIMES DAILY
Status: DISCONTINUED | OUTPATIENT
Start: 2023-01-01 | End: 2023-01-01 | Stop reason: HOSPADM

## 2023-01-01 RX ORDER — PANTOPRAZOLE SODIUM 40 MG/1
40 TABLET, DELAYED RELEASE ORAL
Status: DISCONTINUED | OUTPATIENT
Start: 2023-01-01 | End: 2023-01-01 | Stop reason: HOSPADM

## 2023-01-01 RX ORDER — DOCUSATE SODIUM 100 MG/1
200 CAPSULE, LIQUID FILLED ORAL 2 TIMES DAILY
Status: DISCONTINUED | OUTPATIENT
Start: 2023-01-01 | End: 2023-01-01 | Stop reason: HOSPADM

## 2023-01-01 RX ORDER — GUAIFENESIN 600 MG/1
600 TABLET, EXTENDED RELEASE ORAL EVERY 12 HOURS SCHEDULED
Status: DISCONTINUED | OUTPATIENT
Start: 2023-01-01 | End: 2023-01-01 | Stop reason: HOSPADM

## 2023-01-01 RX ORDER — IPRATROPIUM BROMIDE AND ALBUTEROL SULFATE 2.5; .5 MG/3ML; MG/3ML
3 SOLUTION RESPIRATORY (INHALATION)
Status: DISCONTINUED | OUTPATIENT
Start: 2023-01-01 | End: 2023-01-01 | Stop reason: HOSPADM

## 2023-01-01 RX ORDER — RANOLAZINE 500 MG/1
1000 TABLET, EXTENDED RELEASE ORAL EVERY 12 HOURS SCHEDULED
Status: DISCONTINUED | OUTPATIENT
Start: 2023-01-01 | End: 2023-01-01 | Stop reason: HOSPADM

## 2023-01-01 RX ORDER — ACETAMINOPHEN 325 MG/1
325 TABLET ORAL EVERY 4 HOURS PRN
Status: DISCONTINUED | OUTPATIENT
Start: 2023-01-01 | End: 2023-01-01 | Stop reason: HOSPADM

## 2023-01-01 RX ORDER — ACETAMINOPHEN 325 MG/1
650 TABLET ORAL EVERY 4 HOURS PRN
Status: DISCONTINUED | OUTPATIENT
Start: 2023-01-01 | End: 2023-01-01 | Stop reason: HOSPADM

## 2023-01-01 RX ORDER — QUETIAPINE FUMARATE 50 MG/1
50 TABLET, FILM COATED ORAL NIGHTLY
COMMUNITY

## 2023-01-01 RX ORDER — HYDRALAZINE HYDROCHLORIDE 20 MG/ML
10 INJECTION INTRAMUSCULAR; INTRAVENOUS EVERY 6 HOURS PRN
Status: DISCONTINUED | OUTPATIENT
Start: 2023-01-01 | End: 2023-01-01 | Stop reason: HOSPADM

## 2023-01-01 RX ORDER — POLYETHYLENE GLYCOL 3350 17 G/17G
17 POWDER, FOR SOLUTION ORAL DAILY
Status: DISCONTINUED | OUTPATIENT
Start: 2023-01-01 | End: 2023-01-01 | Stop reason: HOSPADM

## 2023-01-01 RX ORDER — INSULIN LISPRO 100 [IU]/ML
2-9 INJECTION, SOLUTION INTRAVENOUS; SUBCUTANEOUS
Status: DISCONTINUED | OUTPATIENT
Start: 2023-01-01 | End: 2023-01-01 | Stop reason: HOSPADM

## 2023-01-01 RX ORDER — METOPROLOL TARTRATE 50 MG/1
50 TABLET, FILM COATED ORAL 2 TIMES DAILY WITH MEALS
Status: DISCONTINUED | OUTPATIENT
Start: 2023-01-01 | End: 2023-01-01 | Stop reason: HOSPADM

## 2023-01-01 RX ORDER — FUROSEMIDE 10 MG/ML
20 INJECTION INTRAMUSCULAR; INTRAVENOUS ONCE
Status: COMPLETED | OUTPATIENT
Start: 2023-01-01 | End: 2023-01-01

## 2023-01-01 RX ORDER — BENZONATATE 100 MG/1
100 CAPSULE ORAL 3 TIMES DAILY PRN
Qty: 20 CAPSULE | Refills: 0 | Status: SHIPPED | OUTPATIENT
Start: 2023-01-01 | End: 2023-01-01 | Stop reason: HOSPADM

## 2023-01-01 RX ORDER — METOLAZONE 5 MG/1
5 TABLET ORAL 3 TIMES WEEKLY
Status: DISCONTINUED | OUTPATIENT
Start: 2023-01-01 | End: 2023-01-01

## 2023-01-01 RX ORDER — AMOXICILLIN AND CLAVULANATE POTASSIUM 500; 125 MG/1; MG/1
1 TABLET, FILM COATED ORAL 2 TIMES DAILY
COMMUNITY
Start: 2023-01-01 | End: 2023-01-01 | Stop reason: HOSPADM

## 2023-01-01 RX ORDER — OXYCODONE HYDROCHLORIDE 5 MG/1
5 TABLET ORAL EVERY 8 HOURS PRN
Status: DISCONTINUED | OUTPATIENT
Start: 2023-01-01 | End: 2023-01-01 | Stop reason: HOSPADM

## 2023-01-01 RX ORDER — DEXTROSE MONOHYDRATE 25 G/50ML
25 INJECTION, SOLUTION INTRAVENOUS
Status: DISCONTINUED | OUTPATIENT
Start: 2023-01-01 | End: 2023-01-01 | Stop reason: HOSPADM

## 2023-01-01 RX ORDER — OXYCODONE HYDROCHLORIDE 5 MG/1
10 TABLET ORAL EVERY 4 HOURS PRN
Status: DISCONTINUED | OUTPATIENT
Start: 2023-01-01 | End: 2023-01-01

## 2023-01-01 RX ORDER — CEFUROXIME AXETIL 500 MG/1
500 TABLET ORAL 2 TIMES DAILY
Qty: 14 TABLET | Refills: 0 | Status: SHIPPED | OUTPATIENT
Start: 2023-01-01

## 2023-01-01 RX ORDER — OXYCODONE AND ACETAMINOPHEN 10; 325 MG/1; MG/1
1 TABLET ORAL EVERY 6 HOURS PRN
Qty: 120 TABLET | Refills: 0 | Status: SHIPPED | OUTPATIENT
Start: 2023-01-01 | End: 2023-01-01 | Stop reason: SDUPTHER

## 2023-01-01 RX ORDER — METOPROLOL TARTRATE 5 MG/5ML
5 INJECTION INTRAVENOUS EVERY 6 HOURS PRN
Status: DISCONTINUED | OUTPATIENT
Start: 2023-01-01 | End: 2023-01-01 | Stop reason: HOSPADM

## 2023-01-01 RX ORDER — MAGNESIUM SULFATE 1 G/100ML
INJECTION INTRAVENOUS
Status: COMPLETED | OUTPATIENT
Start: 2023-01-01 | End: 2023-01-01

## 2023-01-01 RX ORDER — ESCITALOPRAM OXALATE 10 MG/1
10 TABLET ORAL DAILY
Status: DISCONTINUED | OUTPATIENT
Start: 2023-01-01 | End: 2023-01-01 | Stop reason: HOSPADM

## 2023-01-01 RX ORDER — ONDANSETRON 2 MG/ML
4 INJECTION INTRAMUSCULAR; INTRAVENOUS ONCE
Status: COMPLETED | OUTPATIENT
Start: 2023-01-01 | End: 2023-01-01

## 2023-01-01 RX ORDER — ESCITALOPRAM OXALATE 10 MG/1
10 TABLET ORAL DAILY
COMMUNITY

## 2023-01-01 RX ORDER — SUCRALFATE 1 G/1
1 TABLET ORAL 4 TIMES DAILY
Status: DISCONTINUED | OUTPATIENT
Start: 2023-01-01 | End: 2023-01-01 | Stop reason: HOSPADM

## 2023-01-01 RX ORDER — PREGABALIN 150 MG/1
150 CAPSULE ORAL 2 TIMES DAILY
Qty: 60 CAPSULE | Refills: 5 | Status: SHIPPED | OUTPATIENT
Start: 2023-01-01

## 2023-01-01 RX ORDER — TRAZODONE HYDROCHLORIDE 100 MG/1
100 TABLET ORAL NIGHTLY
Status: DISCONTINUED | OUTPATIENT
Start: 2023-01-01 | End: 2023-01-01

## 2023-01-01 RX ORDER — HYDROXYZINE HYDROCHLORIDE 25 MG/1
25 TABLET, FILM COATED ORAL DAILY
COMMUNITY
End: 2023-01-01 | Stop reason: HOSPADM

## 2023-01-01 RX ORDER — ISOSORBIDE MONONITRATE 60 MG/1
120 TABLET, EXTENDED RELEASE ORAL
Status: DISCONTINUED | OUTPATIENT
Start: 2023-01-01 | End: 2023-01-01 | Stop reason: HOSPADM

## 2023-01-01 RX ORDER — FAMOTIDINE 10 MG/ML
20 INJECTION, SOLUTION INTRAVENOUS DAILY
Status: DISCONTINUED | OUTPATIENT
Start: 2023-01-01 | End: 2023-01-01 | Stop reason: HOSPADM

## 2023-01-01 RX ORDER — ROPINIROLE 1 MG/1
1 TABLET, FILM COATED ORAL DAILY
Status: DISCONTINUED | OUTPATIENT
Start: 2023-01-01 | End: 2023-01-01 | Stop reason: HOSPADM

## 2023-01-01 RX ORDER — QUETIAPINE FUMARATE 25 MG/1
50 TABLET, FILM COATED ORAL NIGHTLY
Status: DISCONTINUED | OUTPATIENT
Start: 2023-01-01 | End: 2023-01-01 | Stop reason: HOSPADM

## 2023-01-01 RX ORDER — ONDANSETRON 2 MG/ML
4 INJECTION INTRAMUSCULAR; INTRAVENOUS EVERY 6 HOURS PRN
Status: DISCONTINUED | OUTPATIENT
Start: 2023-01-01 | End: 2023-01-01 | Stop reason: HOSPADM

## 2023-01-01 RX ORDER — OXYCODONE AND ACETAMINOPHEN 10; 325 MG/1; MG/1
1 TABLET ORAL EVERY 6 HOURS PRN
Qty: 120 TABLET | Refills: 0 | Status: SHIPPED | OUTPATIENT
Start: 2023-01-01

## 2023-01-01 RX ORDER — SODIUM CHLORIDE 0.9 % (FLUSH) 0.9 %
3-10 SYRINGE (ML) INJECTION AS NEEDED
Status: DISCONTINUED | OUTPATIENT
Start: 2023-01-01 | End: 2023-01-01 | Stop reason: HOSPADM

## 2023-01-01 RX ORDER — GLIPIZIDE 5 MG/1
5 TABLET ORAL
Status: DISCONTINUED | OUTPATIENT
Start: 2023-01-01 | End: 2023-01-01 | Stop reason: HOSPADM

## 2023-01-01 RX ORDER — METOCLOPRAMIDE HYDROCHLORIDE 5 MG/ML
10 INJECTION INTRAMUSCULAR; INTRAVENOUS EVERY 6 HOURS
Status: DISCONTINUED | OUTPATIENT
Start: 2023-01-01 | End: 2023-01-01

## 2023-01-01 RX ORDER — INSULIN LISPRO 100 [IU]/ML
2-9 INJECTION, SOLUTION INTRAVENOUS; SUBCUTANEOUS EVERY 6 HOURS SCHEDULED
Status: DISCONTINUED | OUTPATIENT
Start: 2023-01-01 | End: 2023-01-01

## 2023-01-01 RX ORDER — ATORVASTATIN CALCIUM 40 MG/1
40 TABLET, FILM COATED ORAL EVERY MORNING
Status: DISCONTINUED | OUTPATIENT
Start: 2023-01-01 | End: 2023-01-01 | Stop reason: HOSPADM

## 2023-01-01 RX ORDER — SODIUM CHLORIDE 0.9 % (FLUSH) 0.9 %
10 SYRINGE (ML) INJECTION AS NEEDED
Status: DISCONTINUED | OUTPATIENT
Start: 2023-01-01 | End: 2023-01-01 | Stop reason: HOSPADM

## 2023-01-01 RX ORDER — FAMOTIDINE 20 MG/1
20 TABLET, FILM COATED ORAL DAILY
Status: DISCONTINUED | OUTPATIENT
Start: 2023-01-01 | End: 2023-01-01 | Stop reason: SDUPTHER

## 2023-01-01 RX ORDER — PROCHLORPERAZINE EDISYLATE 5 MG/ML
10 INJECTION INTRAMUSCULAR; INTRAVENOUS EVERY 6 HOURS PRN
Status: DISCONTINUED | OUTPATIENT
Start: 2023-01-01 | End: 2023-01-01 | Stop reason: HOSPADM

## 2023-01-01 RX ORDER — SODIUM CHLORIDE 9 MG/ML
40 INJECTION, SOLUTION INTRAVENOUS AS NEEDED
Status: DISCONTINUED | OUTPATIENT
Start: 2023-01-01 | End: 2023-01-01 | Stop reason: HOSPADM

## 2023-01-01 RX ORDER — POLYETHYLENE GLYCOL 3350 17 G/17G
17 POWDER, FOR SOLUTION ORAL DAILY PRN
Status: DISCONTINUED | OUTPATIENT
Start: 2023-01-01 | End: 2023-01-01

## 2023-01-01 RX ORDER — BISACODYL 5 MG/1
5 TABLET, DELAYED RELEASE ORAL DAILY PRN
Status: DISCONTINUED | OUTPATIENT
Start: 2023-01-01 | End: 2023-01-01

## 2023-01-01 RX ORDER — OXYCODONE HYDROCHLORIDE 5 MG/1
10 TABLET ORAL EVERY 4 HOURS PRN
Status: DISCONTINUED | OUTPATIENT
Start: 2023-01-01 | End: 2023-01-01 | Stop reason: HOSPADM

## 2023-01-01 RX ORDER — BENZONATATE 100 MG/1
100 CAPSULE ORAL 3 TIMES DAILY PRN
Status: DISCONTINUED | OUTPATIENT
Start: 2023-01-01 | End: 2023-01-01 | Stop reason: HOSPADM

## 2023-01-01 RX ORDER — ONDANSETRON 4 MG/1
4 TABLET, FILM COATED ORAL EVERY 6 HOURS PRN
Status: DISCONTINUED | OUTPATIENT
Start: 2023-01-01 | End: 2023-01-01 | Stop reason: HOSPADM

## 2023-01-01 RX ORDER — NICOTINE POLACRILEX 4 MG
15 LOZENGE BUCCAL
Status: DISCONTINUED | OUTPATIENT
Start: 2023-01-01 | End: 2023-01-01 | Stop reason: HOSPADM

## 2023-01-01 RX ORDER — INSULIN LISPRO 100 [IU]/ML
2-7 INJECTION, SOLUTION INTRAVENOUS; SUBCUTANEOUS EVERY 6 HOURS SCHEDULED
Status: DISCONTINUED | OUTPATIENT
Start: 2023-01-01 | End: 2023-01-01 | Stop reason: HOSPADM

## 2023-01-01 RX ORDER — PREGABALIN 75 MG/1
150 CAPSULE ORAL 2 TIMES DAILY
Status: DISCONTINUED | OUTPATIENT
Start: 2023-01-01 | End: 2023-01-01 | Stop reason: HOSPADM

## 2023-01-01 RX ORDER — OLANZAPINE 10 MG/2ML
1 INJECTION, POWDER, LYOPHILIZED, FOR SOLUTION INTRAMUSCULAR
Status: DISCONTINUED | OUTPATIENT
Start: 2023-01-01 | End: 2023-01-01 | Stop reason: HOSPADM

## 2023-01-01 RX ORDER — BISACODYL 10 MG
10 SUPPOSITORY, RECTAL RECTAL DAILY PRN
Status: DISCONTINUED | OUTPATIENT
Start: 2023-01-01 | End: 2023-01-01 | Stop reason: HOSPADM

## 2023-01-01 RX ORDER — ACETAMINOPHEN 325 MG/1
650 TABLET ORAL EVERY 4 HOURS PRN
Status: DISCONTINUED | OUTPATIENT
Start: 2023-01-01 | End: 2023-01-01

## 2023-01-01 RX ORDER — SODIUM CHLORIDE 9 MG/ML
INJECTION, SOLUTION INTRAVENOUS
Status: COMPLETED | OUTPATIENT
Start: 2023-01-01 | End: 2023-01-01

## 2023-01-01 RX ORDER — IPRATROPIUM BROMIDE AND ALBUTEROL SULFATE 2.5; .5 MG/3ML; MG/3ML
3 SOLUTION RESPIRATORY (INHALATION) EVERY 4 HOURS PRN
Status: DISCONTINUED | OUTPATIENT
Start: 2023-01-01 | End: 2023-01-01

## 2023-01-01 RX ORDER — POLYETHYLENE GLYCOL 3350 17 G/17G
17 POWDER, FOR SOLUTION ORAL DAILY
Qty: 30 PACKET | Refills: 1 | Status: SHIPPED | OUTPATIENT
Start: 2023-01-01

## 2023-01-01 RX ORDER — SODIUM CHLORIDE 0.9 % (FLUSH) 0.9 %
3 SYRINGE (ML) INJECTION EVERY 12 HOURS SCHEDULED
Status: DISCONTINUED | OUTPATIENT
Start: 2023-01-01 | End: 2023-01-01 | Stop reason: HOSPADM

## 2023-01-01 RX ORDER — BISACODYL 5 MG/1
5 TABLET, DELAYED RELEASE ORAL DAILY PRN
Status: DISCONTINUED | OUTPATIENT
Start: 2023-01-01 | End: 2023-01-01 | Stop reason: HOSPADM

## 2023-01-01 RX ORDER — ESOMEPRAZOLE MAGNESIUM 40 MG/1
40 CAPSULE, DELAYED RELEASE ORAL DAILY
COMMUNITY

## 2023-01-01 RX ORDER — OXYCODONE AND ACETAMINOPHEN 10; 325 MG/1; MG/1
1 TABLET ORAL EVERY 6 HOURS PRN
COMMUNITY
End: 2023-01-01 | Stop reason: SDUPTHER

## 2023-01-01 RX ORDER — TRAZODONE HYDROCHLORIDE 100 MG/1
100 TABLET ORAL NIGHTLY
COMMUNITY
End: 2023-01-01 | Stop reason: HOSPADM

## 2023-01-01 RX ORDER — SODIUM CHLORIDE 9 MG/ML
50 INJECTION, SOLUTION INTRAVENOUS CONTINUOUS
Status: DISCONTINUED | OUTPATIENT
Start: 2023-01-01 | End: 2023-01-01

## 2023-01-01 RX ORDER — HYDROXYZINE HYDROCHLORIDE 25 MG/1
25 TABLET, FILM COATED ORAL DAILY
Status: DISCONTINUED | OUTPATIENT
Start: 2023-01-01 | End: 2023-01-01

## 2023-01-01 RX ORDER — ENOXAPARIN SODIUM 150 MG/ML
120 INJECTION SUBCUTANEOUS EVERY 12 HOURS
Status: DISCONTINUED | OUTPATIENT
Start: 2023-01-01 | End: 2023-01-01 | Stop reason: HOSPADM

## 2023-01-01 RX ORDER — METHYLPREDNISOLONE SODIUM SUCCINATE 40 MG/ML
40 INJECTION, POWDER, LYOPHILIZED, FOR SOLUTION INTRAMUSCULAR; INTRAVENOUS ONCE
Status: COMPLETED | OUTPATIENT
Start: 2023-01-01 | End: 2023-01-01

## 2023-01-01 RX ORDER — EPINEPHRINE 0.1 MG/ML
SYRINGE (ML) INJECTION
Status: COMPLETED | OUTPATIENT
Start: 2023-01-01 | End: 2023-01-01

## 2023-01-01 RX ORDER — BENZONATATE 100 MG/1
200 CAPSULE ORAL 3 TIMES DAILY PRN
Status: DISCONTINUED | OUTPATIENT
Start: 2023-01-01 | End: 2023-01-01 | Stop reason: HOSPADM

## 2023-01-01 RX ORDER — CALCIUM CHLORIDE 100 MG/ML
INJECTION INTRAVENOUS; INTRAVENTRICULAR
Status: COMPLETED | OUTPATIENT
Start: 2023-01-01 | End: 2023-01-01

## 2023-01-01 RX ORDER — AMIODARONE HYDROCHLORIDE 50 MG/ML
INJECTION, SOLUTION INTRAVENOUS
Status: COMPLETED | OUTPATIENT
Start: 2023-01-01 | End: 2023-01-01

## 2023-01-01 RX ORDER — BISACODYL 10 MG
10 SUPPOSITORY, RECTAL RECTAL DAILY PRN
Status: DISCONTINUED | OUTPATIENT
Start: 2023-01-01 | End: 2023-01-01

## 2023-01-01 RX ORDER — SUCRALFATE 1 G/1
1 TABLET ORAL 4 TIMES DAILY
Status: DISCONTINUED | OUTPATIENT
Start: 2023-01-01 | End: 2023-01-01

## 2023-01-01 RX ORDER — FAMOTIDINE 20 MG/1
20 TABLET, FILM COATED ORAL 2 TIMES DAILY
COMMUNITY
End: 2023-01-01 | Stop reason: HOSPADM

## 2023-01-01 RX ORDER — AMOXICILLIN 250 MG
2 CAPSULE ORAL 2 TIMES DAILY
Status: DISCONTINUED | OUTPATIENT
Start: 2023-01-01 | End: 2023-01-01

## 2023-01-01 RX ADMIN — RANOLAZINE 1000 MG: 500 TABLET, FILM COATED, EXTENDED RELEASE ORAL at 08:30

## 2023-01-01 RX ADMIN — CEFTRIAXONE 1 G: 1 INJECTION, POWDER, FOR SOLUTION INTRAMUSCULAR; INTRAVENOUS at 20:22

## 2023-01-01 RX ADMIN — HYDROMORPHONE HYDROCHLORIDE 0.5 MG: 1 INJECTION, SOLUTION INTRAMUSCULAR; INTRAVENOUS; SUBCUTANEOUS at 20:48

## 2023-01-01 RX ADMIN — HYDROMORPHONE HYDROCHLORIDE 0.5 MG: 1 INJECTION, SOLUTION INTRAMUSCULAR; INTRAVENOUS; SUBCUTANEOUS at 05:54

## 2023-01-01 RX ADMIN — QUETIAPINE FUMARATE 50 MG: 25 TABLET ORAL at 21:59

## 2023-01-01 RX ADMIN — HYDROMORPHONE HYDROCHLORIDE 0.5 MG: 1 INJECTION, SOLUTION INTRAMUSCULAR; INTRAVENOUS; SUBCUTANEOUS at 03:33

## 2023-01-01 RX ADMIN — HYDROMORPHONE HYDROCHLORIDE 0.5 MG: 1 INJECTION, SOLUTION INTRAMUSCULAR; INTRAVENOUS; SUBCUTANEOUS at 03:15

## 2023-01-01 RX ADMIN — ROPINIROLE HYDROCHLORIDE 1 MG: 1 TABLET, FILM COATED ORAL at 09:08

## 2023-01-01 RX ADMIN — HYDROMORPHONE HYDROCHLORIDE 0.5 MG: 1 INJECTION, SOLUTION INTRAMUSCULAR; INTRAVENOUS; SUBCUTANEOUS at 00:21

## 2023-01-01 RX ADMIN — RANOLAZINE 1000 MG: 500 TABLET, FILM COATED, EXTENDED RELEASE ORAL at 20:22

## 2023-01-01 RX ADMIN — ROPINIROLE HYDROCHLORIDE 1 MG: 1 TABLET, FILM COATED ORAL at 08:14

## 2023-01-01 RX ADMIN — PREGABALIN 150 MG: 75 CAPSULE ORAL at 10:35

## 2023-01-01 RX ADMIN — FAMOTIDINE 20 MG: 10 INJECTION INTRAVENOUS at 08:32

## 2023-01-01 RX ADMIN — PREGABALIN 150 MG: 75 CAPSULE ORAL at 08:15

## 2023-01-01 RX ADMIN — Medication 3 ML: at 10:35

## 2023-01-01 RX ADMIN — IPRATROPIUM BROMIDE AND ALBUTEROL SULFATE 3 ML: 2.5; .5 SOLUTION RESPIRATORY (INHALATION) at 10:30

## 2023-01-01 RX ADMIN — OXYCODONE HYDROCHLORIDE 10 MG: 5 TABLET ORAL at 05:17

## 2023-01-01 RX ADMIN — ROPINIROLE HYDROCHLORIDE 1 MG: 1 TABLET, FILM COATED ORAL at 08:32

## 2023-01-01 RX ADMIN — APIXABAN 5 MG: 5 TABLET, FILM COATED ORAL at 20:22

## 2023-01-01 RX ADMIN — GUAIFENESIN 600 MG: 600 TABLET, EXTENDED RELEASE ORAL at 08:36

## 2023-01-01 RX ADMIN — PANTOPRAZOLE SODIUM 40 MG: 40 TABLET, DELAYED RELEASE ORAL at 05:17

## 2023-01-01 RX ADMIN — ATORVASTATIN CALCIUM 40 MG: 40 TABLET, FILM COATED ORAL at 06:19

## 2023-01-01 RX ADMIN — ROPINIROLE HYDROCHLORIDE 1 MG: 1 TABLET, FILM COATED ORAL at 11:33

## 2023-01-01 RX ADMIN — IPRATROPIUM BROMIDE AND ALBUTEROL SULFATE 3 ML: 2.5; .5 SOLUTION RESPIRATORY (INHALATION) at 07:52

## 2023-01-01 RX ADMIN — ISOSORBIDE MONONITRATE 120 MG: 60 TABLET, EXTENDED RELEASE ORAL at 09:51

## 2023-01-01 RX ADMIN — INSULIN LISPRO 2 UNITS: 100 INJECTION, SOLUTION INTRAVENOUS; SUBCUTANEOUS at 06:09

## 2023-01-01 RX ADMIN — INSULIN LISPRO 2 UNITS: 100 INJECTION, SOLUTION INTRAVENOUS; SUBCUTANEOUS at 11:34

## 2023-01-01 RX ADMIN — QUETIAPINE FUMARATE 50 MG: 25 TABLET ORAL at 20:21

## 2023-01-01 RX ADMIN — ENOXAPARIN SODIUM 120 MG: 150 INJECTION SUBCUTANEOUS at 00:21

## 2023-01-01 RX ADMIN — HYDROMORPHONE HYDROCHLORIDE 1 MG: 1 INJECTION, SOLUTION INTRAMUSCULAR; INTRAVENOUS; SUBCUTANEOUS at 08:28

## 2023-01-01 RX ADMIN — ENOXAPARIN SODIUM 120 MG: 150 INJECTION SUBCUTANEOUS at 16:20

## 2023-01-01 RX ADMIN — DOCUSATE SODIUM 200 MG: 100 CAPSULE, LIQUID FILLED ORAL at 20:21

## 2023-01-01 RX ADMIN — Medication 3 ML: at 08:32

## 2023-01-01 RX ADMIN — HYDROMORPHONE HYDROCHLORIDE 0.25 MG: 1 INJECTION, SOLUTION INTRAMUSCULAR; INTRAVENOUS; SUBCUTANEOUS at 06:27

## 2023-01-01 RX ADMIN — RANOLAZINE 1000 MG: 500 TABLET, FILM COATED, EXTENDED RELEASE ORAL at 08:14

## 2023-01-01 RX ADMIN — METHYLPREDNISOLONE SODIUM SUCCINATE 40 MG: 40 INJECTION, POWDER, FOR SOLUTION INTRAMUSCULAR; INTRAVENOUS at 09:39

## 2023-01-01 RX ADMIN — ESCITALOPRAM OXALATE 10 MG: 10 TABLET ORAL at 08:14

## 2023-01-01 RX ADMIN — RANOLAZINE 1000 MG: 500 TABLET, FILM COATED, EXTENDED RELEASE ORAL at 10:35

## 2023-01-01 RX ADMIN — DOCUSATE SODIUM AND SENNOSIDES 2 TABLET: 8.6; 5 TABLET, FILM COATED ORAL at 08:30

## 2023-01-01 RX ADMIN — PANTOPRAZOLE SODIUM 40 MG: 40 TABLET, DELAYED RELEASE ORAL at 09:51

## 2023-01-01 RX ADMIN — METOCLOPRAMIDE 10 MG: 5 INJECTION, SOLUTION INTRAMUSCULAR; INTRAVENOUS at 18:24

## 2023-01-01 RX ADMIN — GUAIFENESIN 600 MG: 600 TABLET, EXTENDED RELEASE ORAL at 21:12

## 2023-01-01 RX ADMIN — HYDROMORPHONE HYDROCHLORIDE 0.25 MG: 1 INJECTION, SOLUTION INTRAMUSCULAR; INTRAVENOUS; SUBCUTANEOUS at 17:44

## 2023-01-01 RX ADMIN — ENOXAPARIN SODIUM 120 MG: 150 INJECTION SUBCUTANEOUS at 01:21

## 2023-01-01 RX ADMIN — ENOXAPARIN SODIUM 120 MG: 150 INJECTION SUBCUTANEOUS at 15:26

## 2023-01-01 RX ADMIN — Medication 3 ML: at 21:08

## 2023-01-01 RX ADMIN — OXYCODONE HYDROCHLORIDE 5 MG: 5 TABLET ORAL at 15:26

## 2023-01-01 RX ADMIN — Medication 3 ML: at 08:37

## 2023-01-01 RX ADMIN — DOCUSATE SODIUM AND SENNOSIDES 2 TABLET: 8.6; 5 TABLET, FILM COATED ORAL at 20:47

## 2023-01-01 RX ADMIN — ISOSORBIDE MONONITRATE 120 MG: 60 TABLET, EXTENDED RELEASE ORAL at 08:31

## 2023-01-01 RX ADMIN — PREGABALIN 150 MG: 75 CAPSULE ORAL at 08:36

## 2023-01-01 RX ADMIN — ATORVASTATIN CALCIUM 40 MG: 40 TABLET, FILM COATED ORAL at 06:31

## 2023-01-01 RX ADMIN — ESCITALOPRAM OXALATE 10 MG: 10 TABLET, FILM COATED ORAL at 08:36

## 2023-01-01 RX ADMIN — HYDROMORPHONE HYDROCHLORIDE 1 MG: 1 INJECTION, SOLUTION INTRAMUSCULAR; INTRAVENOUS; SUBCUTANEOUS at 20:22

## 2023-01-01 RX ADMIN — SODIUM CHLORIDE 1000 ML: 9 INJECTION, SOLUTION INTRAVENOUS at 08:01

## 2023-01-01 RX ADMIN — HYDROMORPHONE HYDROCHLORIDE 0.5 MG: 1 INJECTION, SOLUTION INTRAMUSCULAR; INTRAVENOUS; SUBCUTANEOUS at 17:36

## 2023-01-01 RX ADMIN — INSULIN LISPRO 2 UNITS: 100 INJECTION, SOLUTION INTRAVENOUS; SUBCUTANEOUS at 18:24

## 2023-01-01 RX ADMIN — HYDROMORPHONE HYDROCHLORIDE 1 MG: 1 INJECTION, SOLUTION INTRAMUSCULAR; INTRAVENOUS; SUBCUTANEOUS at 10:56

## 2023-01-01 RX ADMIN — SUCRALFATE 1 G: 1 TABLET ORAL at 12:36

## 2023-01-01 RX ADMIN — MAGNESIUM SULFATE IN DEXTROSE 2 G: 10 INJECTION, SOLUTION INTRAVENOUS at 01:20

## 2023-01-01 RX ADMIN — SODIUM CHLORIDE 50 ML/HR: 9 INJECTION, SOLUTION INTRAVENOUS at 12:07

## 2023-01-01 RX ADMIN — HYDROMORPHONE HYDROCHLORIDE 0.5 MG: 1 INJECTION, SOLUTION INTRAMUSCULAR; INTRAVENOUS; SUBCUTANEOUS at 19:31

## 2023-01-01 RX ADMIN — PREGABALIN 150 MG: 75 CAPSULE ORAL at 21:59

## 2023-01-01 RX ADMIN — EPINEPHRINE 1 MG: 0.1 INJECTION, SOLUTION ENDOTRACHEAL; INTRACARDIAC; INTRAVENOUS at 01:15

## 2023-01-01 RX ADMIN — FUROSEMIDE 20 MG: 10 INJECTION, SOLUTION INTRAMUSCULAR; INTRAVENOUS at 10:40

## 2023-01-01 RX ADMIN — DOCUSATE SODIUM 200 MG: 100 CAPSULE, LIQUID FILLED ORAL at 08:32

## 2023-01-01 RX ADMIN — SODIUM CHLORIDE 1000 ML: 9 INJECTION, SOLUTION INTRAVENOUS at 01:16

## 2023-01-01 RX ADMIN — ONDANSETRON 4 MG: 2 INJECTION INTRAMUSCULAR; INTRAVENOUS at 11:58

## 2023-01-01 RX ADMIN — HYDROMORPHONE HYDROCHLORIDE 1 MG: 1 INJECTION, SOLUTION INTRAMUSCULAR; INTRAVENOUS; SUBCUTANEOUS at 12:00

## 2023-01-01 RX ADMIN — GLIPIZIDE 5 MG: 5 TABLET ORAL at 08:14

## 2023-01-01 RX ADMIN — HYDROMORPHONE HYDROCHLORIDE 0.5 MG: 1 INJECTION, SOLUTION INTRAMUSCULAR; INTRAVENOUS; SUBCUTANEOUS at 14:40

## 2023-01-01 RX ADMIN — IPRATROPIUM BROMIDE AND ALBUTEROL SULFATE 3 ML: 2.5; .5 SOLUTION RESPIRATORY (INHALATION) at 09:34

## 2023-01-01 RX ADMIN — GUAIFENESIN 600 MG: 600 TABLET, EXTENDED RELEASE ORAL at 09:08

## 2023-01-01 RX ADMIN — IPRATROPIUM BROMIDE AND ALBUTEROL SULFATE 3 ML: 2.5; .5 SOLUTION RESPIRATORY (INHALATION) at 20:45

## 2023-01-01 RX ADMIN — DOCUSATE SODIUM 200 MG: 100 CAPSULE, LIQUID FILLED ORAL at 08:14

## 2023-01-01 RX ADMIN — HYDROMORPHONE HYDROCHLORIDE 0.5 MG: 1 INJECTION, SOLUTION INTRAMUSCULAR; INTRAVENOUS; SUBCUTANEOUS at 08:33

## 2023-01-01 RX ADMIN — GLIPIZIDE 5 MG: 5 TABLET ORAL at 08:31

## 2023-01-01 RX ADMIN — IPRATROPIUM BROMIDE AND ALBUTEROL SULFATE 3 ML: 2.5; .5 SOLUTION RESPIRATORY (INHALATION) at 20:30

## 2023-01-01 RX ADMIN — ESCITALOPRAM OXALATE 10 MG: 10 TABLET ORAL at 08:32

## 2023-01-01 RX ADMIN — Medication 3 ML: at 08:26

## 2023-01-01 RX ADMIN — ONDANSETRON 4 MG: 2 INJECTION INTRAMUSCULAR; INTRAVENOUS at 04:22

## 2023-01-01 RX ADMIN — Medication 3 ML: at 11:51

## 2023-01-01 RX ADMIN — ROPINIROLE HYDROCHLORIDE 1 MG: 1 TABLET, FILM COATED ORAL at 08:37

## 2023-01-01 RX ADMIN — POLYETHYLENE GLYCOL 3350 17 G: 17 POWDER, FOR SOLUTION ORAL at 08:46

## 2023-01-01 RX ADMIN — ESCITALOPRAM OXALATE 10 MG: 10 TABLET, FILM COATED ORAL at 09:51

## 2023-01-01 RX ADMIN — ENOXAPARIN SODIUM 120 MG: 150 INJECTION SUBCUTANEOUS at 14:39

## 2023-01-01 RX ADMIN — IPRATROPIUM BROMIDE AND ALBUTEROL SULFATE 3 ML: 2.5; .5 SOLUTION RESPIRATORY (INHALATION) at 11:45

## 2023-01-01 RX ADMIN — Medication 3 ML: at 21:58

## 2023-01-01 RX ADMIN — APIXABAN 5 MG: 5 TABLET, FILM COATED ORAL at 21:12

## 2023-01-01 RX ADMIN — METOPROLOL TARTRATE 50 MG: 50 TABLET, FILM COATED ORAL at 09:51

## 2023-01-01 RX ADMIN — HYDROMORPHONE HYDROCHLORIDE 0.5 MG: 1 INJECTION, SOLUTION INTRAMUSCULAR; INTRAVENOUS; SUBCUTANEOUS at 07:45

## 2023-01-01 RX ADMIN — IPRATROPIUM BROMIDE AND ALBUTEROL SULFATE 3 ML: 2.5; .5 SOLUTION RESPIRATORY (INHALATION) at 14:51

## 2023-01-01 RX ADMIN — DOCUSATE SODIUM 200 MG: 100 CAPSULE, LIQUID FILLED ORAL at 20:48

## 2023-01-01 RX ADMIN — Medication 3 ML: at 21:19

## 2023-01-01 RX ADMIN — SUCRALFATE 1 G: 1 TABLET ORAL at 12:37

## 2023-01-01 RX ADMIN — HYDROMORPHONE HYDROCHLORIDE 1 MG: 1 INJECTION, SOLUTION INTRAMUSCULAR; INTRAVENOUS; SUBCUTANEOUS at 16:07

## 2023-01-01 RX ADMIN — IPRATROPIUM BROMIDE AND ALBUTEROL SULFATE 3 ML: 2.5; .5 SOLUTION RESPIRATORY (INHALATION) at 17:05

## 2023-01-01 RX ADMIN — FAMOTIDINE 20 MG: 10 INJECTION INTRAVENOUS at 08:33

## 2023-01-01 RX ADMIN — INSULIN LISPRO 2 UNITS: 100 INJECTION, SOLUTION INTRAVENOUS; SUBCUTANEOUS at 00:49

## 2023-01-01 RX ADMIN — EPINEPHRINE 1 MG: 0.1 INJECTION, SOLUTION ENDOTRACHEAL; INTRACARDIAC; INTRAVENOUS at 01:18

## 2023-01-01 RX ADMIN — HYDROMORPHONE HYDROCHLORIDE 0.25 MG: 1 INJECTION, SOLUTION INTRAMUSCULAR; INTRAVENOUS; SUBCUTANEOUS at 21:58

## 2023-01-01 RX ADMIN — PREGABALIN 150 MG: 75 CAPSULE ORAL at 08:30

## 2023-01-01 RX ADMIN — ISOSORBIDE MONONITRATE 120 MG: 60 TABLET, EXTENDED RELEASE ORAL at 08:36

## 2023-01-01 RX ADMIN — INSULIN LISPRO 2 UNITS: 100 INJECTION, SOLUTION INTRAVENOUS; SUBCUTANEOUS at 09:39

## 2023-01-01 RX ADMIN — IPRATROPIUM BROMIDE AND ALBUTEROL SULFATE 3 ML: 2.5; .5 SOLUTION RESPIRATORY (INHALATION) at 08:26

## 2023-01-01 RX ADMIN — DOCUSATE SODIUM 200 MG: 100 CAPSULE, LIQUID FILLED ORAL at 21:59

## 2023-01-01 RX ADMIN — PREGABALIN 150 MG: 75 CAPSULE ORAL at 11:34

## 2023-01-01 RX ADMIN — ISOSORBIDE MONONITRATE 120 MG: 60 TABLET, EXTENDED RELEASE ORAL at 14:40

## 2023-01-01 RX ADMIN — PREGABALIN 150 MG: 75 CAPSULE ORAL at 20:22

## 2023-01-01 RX ADMIN — Medication 3 ML: at 08:15

## 2023-01-01 RX ADMIN — ONDANSETRON 4 MG: 2 INJECTION INTRAMUSCULAR; INTRAVENOUS at 23:28

## 2023-01-01 RX ADMIN — DOCUSATE SODIUM 50 MG AND SENNOSIDES 8.6 MG 2 TABLET: 8.6; 5 TABLET, FILM COATED ORAL at 08:15

## 2023-01-01 RX ADMIN — METOPROLOL TARTRATE 50 MG: 50 TABLET, FILM COATED ORAL at 08:36

## 2023-01-01 RX ADMIN — HYDROMORPHONE HYDROCHLORIDE 0.25 MG: 1 INJECTION, SOLUTION INTRAMUSCULAR; INTRAVENOUS; SUBCUTANEOUS at 11:06

## 2023-01-01 RX ADMIN — ENOXAPARIN SODIUM 120 MG: 150 INJECTION SUBCUTANEOUS at 14:53

## 2023-01-01 RX ADMIN — FAMOTIDINE 20 MG: 10 INJECTION INTRAVENOUS at 08:15

## 2023-01-01 RX ADMIN — BENZONATATE 200 MG: 100 CAPSULE ORAL at 10:35

## 2023-01-01 RX ADMIN — ATORVASTATIN CALCIUM 40 MG: 40 TABLET, FILM COATED ORAL at 06:27

## 2023-01-01 RX ADMIN — ONDANSETRON 4 MG: 2 INJECTION INTRAMUSCULAR; INTRAVENOUS at 20:44

## 2023-01-01 RX ADMIN — HYDROMORPHONE HYDROCHLORIDE 0.5 MG: 1 INJECTION, SOLUTION INTRAMUSCULAR; INTRAVENOUS; SUBCUTANEOUS at 16:42

## 2023-01-01 RX ADMIN — RANOLAZINE 1000 MG: 500 TABLET, FILM COATED, EXTENDED RELEASE ORAL at 20:21

## 2023-01-01 RX ADMIN — DOCUSATE SODIUM 200 MG: 100 CAPSULE, LIQUID FILLED ORAL at 09:51

## 2023-01-01 RX ADMIN — FAMOTIDINE 20 MG: 10 INJECTION INTRAVENOUS at 08:28

## 2023-01-01 RX ADMIN — IPRATROPIUM BROMIDE AND ALBUTEROL SULFATE 3 ML: 2.5; .5 SOLUTION RESPIRATORY (INHALATION) at 11:55

## 2023-01-01 RX ADMIN — HYDROMORPHONE HYDROCHLORIDE 1 MG: 1 INJECTION, SOLUTION INTRAMUSCULAR; INTRAVENOUS; SUBCUTANEOUS at 08:01

## 2023-01-01 RX ADMIN — METOPROLOL TARTRATE 50 MG: 50 TABLET, FILM COATED ORAL at 11:33

## 2023-01-01 RX ADMIN — IPRATROPIUM BROMIDE AND ALBUTEROL SULFATE 3 ML: 2.5; .5 SOLUTION RESPIRATORY (INHALATION) at 06:15

## 2023-01-01 RX ADMIN — METOPROLOL TARTRATE 50 MG: 50 TABLET, FILM COATED ORAL at 08:32

## 2023-01-01 RX ADMIN — IPRATROPIUM BROMIDE AND ALBUTEROL SULFATE 3 ML: 2.5; .5 SOLUTION RESPIRATORY (INHALATION) at 19:35

## 2023-01-01 RX ADMIN — ENOXAPARIN SODIUM 120 MG: 150 INJECTION SUBCUTANEOUS at 01:00

## 2023-01-01 RX ADMIN — ONDANSETRON 4 MG: 2 INJECTION INTRAMUSCULAR; INTRAVENOUS at 15:12

## 2023-01-01 RX ADMIN — DOCUSATE SODIUM 200 MG: 100 CAPSULE, LIQUID FILLED ORAL at 08:37

## 2023-01-01 RX ADMIN — AMIODARONE HYDROCHLORIDE 300 MG: 50 INJECTION, SOLUTION INTRAVENOUS at 01:14

## 2023-01-01 RX ADMIN — HYDROMORPHONE HYDROCHLORIDE 0.5 MG: 1 INJECTION, SOLUTION INTRAMUSCULAR; INTRAVENOUS; SUBCUTANEOUS at 15:45

## 2023-01-01 RX ADMIN — PREGABALIN 150 MG: 75 CAPSULE ORAL at 08:31

## 2023-01-01 RX ADMIN — HYDROMORPHONE HYDROCHLORIDE 0.5 MG: 1 INJECTION, SOLUTION INTRAMUSCULAR; INTRAVENOUS; SUBCUTANEOUS at 11:31

## 2023-01-01 RX ADMIN — METOCLOPRAMIDE 10 MG: 5 INJECTION, SOLUTION INTRAMUSCULAR; INTRAVENOUS at 12:06

## 2023-01-01 RX ADMIN — DOCUSATE SODIUM 200 MG: 100 CAPSULE, LIQUID FILLED ORAL at 11:34

## 2023-01-01 RX ADMIN — HYDROMORPHONE HYDROCHLORIDE 0.25 MG: 1 INJECTION, SOLUTION INTRAMUSCULAR; INTRAVENOUS; SUBCUTANEOUS at 11:40

## 2023-01-01 RX ADMIN — EPINEPHRINE 1 MG: 0.1 INJECTION, SOLUTION ENDOTRACHEAL; INTRACARDIAC; INTRAVENOUS at 01:11

## 2023-01-01 RX ADMIN — Medication 3 ML: at 20:48

## 2023-01-01 RX ADMIN — ONDANSETRON 4 MG: 2 INJECTION INTRAMUSCULAR; INTRAVENOUS at 11:31

## 2023-01-01 RX ADMIN — CEFTRIAXONE 1 G: 1 INJECTION, POWDER, FOR SOLUTION INTRAMUSCULAR; INTRAVENOUS at 21:12

## 2023-01-01 RX ADMIN — ESCITALOPRAM OXALATE 10 MG: 10 TABLET ORAL at 08:31

## 2023-01-01 RX ADMIN — AMIODARONE HYDROCHLORIDE 150 MG: 50 INJECTION, SOLUTION INTRAVENOUS at 01:19

## 2023-01-01 RX ADMIN — ESCITALOPRAM OXALATE 10 MG: 10 TABLET ORAL at 11:33

## 2023-01-01 RX ADMIN — OXYCODONE HYDROCHLORIDE 10 MG: 5 TABLET ORAL at 21:06

## 2023-01-01 RX ADMIN — GUAIFENESIN 600 MG: 600 TABLET, EXTENDED RELEASE ORAL at 20:22

## 2023-01-01 RX ADMIN — HYDROMORPHONE HYDROCHLORIDE 0.5 MG: 1 INJECTION, SOLUTION INTRAMUSCULAR; INTRAVENOUS; SUBCUTANEOUS at 01:21

## 2023-01-01 RX ADMIN — SUCRALFATE 1 G: 1 TABLET ORAL at 20:22

## 2023-01-01 RX ADMIN — HYDROMORPHONE HYDROCHLORIDE 0.25 MG: 1 INJECTION, SOLUTION INTRAMUSCULAR; INTRAVENOUS; SUBCUTANEOUS at 02:31

## 2023-01-01 RX ADMIN — SODIUM CHLORIDE 50 ML/HR: 9 INJECTION, SOLUTION INTRAVENOUS at 16:20

## 2023-01-01 RX ADMIN — SODIUM CHLORIDE 50 ML/HR: 9 INJECTION, SOLUTION INTRAVENOUS at 16:07

## 2023-01-01 RX ADMIN — PREGABALIN 150 MG: 75 CAPSULE ORAL at 20:21

## 2023-01-01 RX ADMIN — Medication 3 ML: at 20:21

## 2023-01-01 RX ADMIN — RANOLAZINE 1000 MG: 500 TABLET, FILM COATED, EXTENDED RELEASE ORAL at 20:47

## 2023-01-01 RX ADMIN — INSULIN LISPRO 2 UNITS: 100 INJECTION, SOLUTION INTRAVENOUS; SUBCUTANEOUS at 08:28

## 2023-01-01 RX ADMIN — METOPROLOL TARTRATE 50 MG: 50 TABLET, FILM COATED ORAL at 17:36

## 2023-01-01 RX ADMIN — SUCRALFATE 1 G: 1 TABLET ORAL at 07:45

## 2023-01-01 RX ADMIN — METOPROLOL TARTRATE 50 MG: 50 TABLET, FILM COATED ORAL at 08:14

## 2023-01-01 RX ADMIN — CALCIUM CHLORIDE 1 G: 100 INJECTION, SOLUTION INTRAVENOUS at 01:17

## 2023-01-01 RX ADMIN — ONDANSETRON 4 MG: 2 INJECTION INTRAMUSCULAR; INTRAVENOUS at 08:01

## 2023-01-01 RX ADMIN — QUETIAPINE FUMARATE 50 MG: 25 TABLET ORAL at 20:48

## 2023-01-01 RX ADMIN — RANOLAZINE 1000 MG: 500 TABLET, FILM COATED, EXTENDED RELEASE ORAL at 11:33

## 2023-01-01 RX ADMIN — APIXABAN 5 MG: 5 TABLET, FILM COATED ORAL at 08:36

## 2023-01-01 RX ADMIN — DOCUSATE SODIUM AND SENNOSIDES 2 TABLET: 8.6; 5 TABLET, FILM COATED ORAL at 11:33

## 2023-01-01 RX ADMIN — RANOLAZINE 1000 MG: 500 TABLET, FILM COATED, EXTENDED RELEASE ORAL at 08:36

## 2023-01-01 RX ADMIN — GLIPIZIDE 5 MG: 5 TABLET ORAL at 11:33

## 2023-01-01 RX ADMIN — OXYCODONE HYDROCHLORIDE 10 MG: 5 TABLET ORAL at 01:14

## 2023-01-01 RX ADMIN — SUCRALFATE 1 G: 1 TABLET ORAL at 17:36

## 2023-01-01 RX ADMIN — RANOLAZINE 1000 MG: 500 TABLET, FILM COATED, EXTENDED RELEASE ORAL at 08:32

## 2023-01-01 RX ADMIN — INSULIN LISPRO 2 UNITS: 100 INJECTION, SOLUTION INTRAVENOUS; SUBCUTANEOUS at 00:56

## 2023-01-01 RX ADMIN — OXYCODONE HYDROCHLORIDE 10 MG: 5 TABLET ORAL at 10:58

## 2023-01-01 RX ADMIN — DOCUSATE SODIUM 200 MG: 100 CAPSULE, LIQUID FILLED ORAL at 08:31

## 2023-01-01 RX ADMIN — PROMETHAZINE HYDROCHLORIDE 12.5 MG: 25 INJECTION INTRAMUSCULAR; INTRAVENOUS at 08:26

## 2023-01-01 RX ADMIN — SUCRALFATE 1 G: 1 TABLET ORAL at 08:36

## 2023-01-01 RX ADMIN — PREGABALIN 150 MG: 75 CAPSULE ORAL at 20:48

## 2023-01-01 RX ADMIN — IPRATROPIUM BROMIDE AND ALBUTEROL SULFATE 3 ML: 2.5; .5 SOLUTION RESPIRATORY (INHALATION) at 03:44

## 2023-01-01 RX ADMIN — RANOLAZINE 1000 MG: 500 TABLET, FILM COATED, EXTENDED RELEASE ORAL at 21:59

## 2023-01-01 RX ADMIN — SUCRALFATE 1 G: 1 TABLET ORAL at 21:12

## 2023-01-01 RX ADMIN — INSULIN LISPRO 4 UNITS: 100 INJECTION, SOLUTION INTRAVENOUS; SUBCUTANEOUS at 14:53

## 2023-01-01 RX ADMIN — HYDROMORPHONE HYDROCHLORIDE 1 MG: 1 INJECTION, SOLUTION INTRAMUSCULAR; INTRAVENOUS; SUBCUTANEOUS at 04:22

## 2023-01-01 RX ADMIN — ENOXAPARIN SODIUM 120 MG: 150 INJECTION SUBCUTANEOUS at 00:47

## 2023-01-01 RX ADMIN — IPRATROPIUM BROMIDE AND ALBUTEROL SULFATE 3 ML: 2.5; .5 SOLUTION RESPIRATORY (INHALATION) at 11:12

## 2023-01-01 RX ADMIN — HYDROMORPHONE HYDROCHLORIDE 0.5 MG: 1 INJECTION, SOLUTION INTRAMUSCULAR; INTRAVENOUS; SUBCUTANEOUS at 23:20

## 2023-01-01 RX ADMIN — PROMETHAZINE HYDROCHLORIDE 12.5 MG: 25 INJECTION INTRAMUSCULAR; INTRAVENOUS at 00:48

## 2023-01-01 RX ADMIN — APIXABAN 5 MG: 5 TABLET, FILM COATED ORAL at 09:08

## 2023-01-01 RX ADMIN — HYDROMORPHONE HYDROCHLORIDE 1 MG: 1 INJECTION, SOLUTION INTRAMUSCULAR; INTRAVENOUS; SUBCUTANEOUS at 00:48

## 2023-01-01 RX ADMIN — POLYETHYLENE GLYCOL 3350 17 G: 17 POWDER, FOR SOLUTION ORAL at 09:51

## 2023-01-01 RX ADMIN — HYDROMORPHONE HYDROCHLORIDE 0.5 MG: 1 INJECTION, SOLUTION INTRAMUSCULAR; INTRAVENOUS; SUBCUTANEOUS at 12:37

## 2023-01-01 RX ADMIN — HYDROMORPHONE HYDROCHLORIDE 0.5 MG: 1 INJECTION, SOLUTION INTRAMUSCULAR; INTRAVENOUS; SUBCUTANEOUS at 20:21

## 2023-01-01 RX ADMIN — ISOSORBIDE MONONITRATE 120 MG: 60 TABLET, EXTENDED RELEASE ORAL at 08:14

## 2023-01-01 RX ADMIN — BENZONATATE 200 MG: 100 CAPSULE ORAL at 03:09

## 2023-01-01 RX ADMIN — BENZONATATE 100 MG: 100 CAPSULE ORAL at 22:35

## 2023-01-01 RX ADMIN — DOCUSATE SODIUM 50 MG AND SENNOSIDES 8.6 MG 2 TABLET: 8.6; 5 TABLET, FILM COATED ORAL at 08:31
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
## 2023-02-06 NOTE — CASE MANAGEMENT/SOCIAL WORK
Discharge Planning Assessment   Claus     Patient Name: Ro Nathan  MRN: [id number]  Today's Date: 2/6/2023    Admit Date: 2/6/2023    Plan: DC PLAN: Routine home. Home 02 3L thru [place]. Current with [facility]       Discharge Needs Assessment     Row Name 02/06/23 1411       Living Environment    People in Home other (see comments)    Unique Family Situation Sister    Current Living Arrangements home    Primary Care Provided by self    Provides Primary Care For no one    Family Caregiver if Needed none    Quality of Family Relationships helpful;involved    Able to Return to Prior Arrangements yes       Food Insecurity    Within the past 12 months, you worried that your food would run out before you got the money to buy more. Never true    Within the past 12 months, the food you bought just didn't last and you didn't have money to get more. Never true       Transition Planning    Patient/Family Anticipates Transition to home with family    Patient/Family Anticipated Services at Transition     Transportation Anticipated family or friend will provide       Discharge Needs Assessment    Equipment Currently Used at Home walker, standard;glucometer               Discharge Plan     Row Name 02/06/23 1413       Plan    Patient/Family in Agreement with Plan yes    Plan Comments Met with patient and family at bedside, from routine home with sister. Independent with ADL's uses walker when needed.has working glucometer, Currently wears 02 at home at 3L thru [place]. PCP is Dr. Jones, Pharmacy is [facility]. Able to afford medications. Denies any transportation issues, Family will provide when needed. States has used Home health in the past but does not remember the company. Denies any concerns about returning home.  Current with [facility], DCP report sent, Requested Resumption of care order.    Row Name 02/06/23 1412       Plan    Plan DC PLAN: Routine home. Home 02 3L thru [place].              Continued Care and  Services - Admitted Since 2/6/2023    Coordination has not been started for this encounter.     Selected Continued Care - Prior Encounters Includes continued care and service providers with selected services from prior encounters from 11/8/2022 to 2/6/2023    Discharged on 12/10/2022 Admission date: 12/7/2022 - Discharge disposition: Home-Health Care [facility]    Home Medical Care     Service Provider Selected Services Address Phone Fax Patient Preferred    [facility] [place] [address] [address] [phone] --                Discharged on 11/30/2022 Admission date: 11/26/2022 - Discharge disposition: Home-Health Care [facility]     Service Provider Selected Services Address Phone Fax Patient Preferred    [facility] [place] [address] [address] [phone] --                       Demographic Summary     Row Name 02/06/23 1409       General Information    Admission Type inpatient    Arrived From emergency department    Referral Source admission list    Reason for Consult care coordination/care conference    Preferred Language English       Contact Information    Permission Granted to Share Info With     Contact Information Obtained for                Functional Status     Row Name 02/06/23 1411       Functional Status    Usual Activity Tolerance moderate    Current Activity Tolerance moderate       Assessment of Health Literacy    How often do you have someone help you read hospital materials? Sometimes    How often do you have problems learning about your medical condition because of difficulty understanding written information? Sometimes    How often do you have a problem understanding what is told to you about your medical condition? Sometimes    How confident are you filling out medical forms by yourself? Somewhat    Health Literacy Moderate        Functional Status, IADL    Medications independent    Meal Preparation independent    Housekeeping independent    Laundry independent    Shopping independent       Mental Status    General Appearance WDL WDL                Ashley Pratt RN     Office phone: [phone]  Cell phone: [phone]

## 2023-02-06 NOTE — ED NOTES
[facility] rm [facility]

## 2023-02-06 NOTE — CONSULTS
General Surgery Consult Note      Name: Ro Nathan ADMIT: 2023   : 1945  PCP: Yusuf Jones MD    MRN: [id number] LOS: 0 days   AGE/SEX: 77 y.o. male  ROOM:    [facility]      Patient Care Team:  Yusuf Jones MD as PCP - General  Joshua Yip MD as Consulting Physician (Cardiology)  Shirley, Moise Kathleen MD as Consulting Physician (Cardiac Electrophysiology)  Izzy Alvarez MD as Consulting Physician (Nephrology)  Celine Swanson MD as Consulting Physician (Cardiology)  Chief Complaint   Patient presents with   • Abdominal Pain       Subjective   77-year-old gentleman complicated abdominal surgical history multiple hernias in the past has been seen by at least 5 or so surgeons Who confirmed that he has a inoperable hernia who is back to the hospital for his nearly quarterly acute on chronic bowel obstruction symptoms.  He says that he has abdominal distention abdominal pain nausea and vomiting.  Is passing flatus last bowel movement was this morning.  The pain is deep and sharp and located in the center of his abdomen.  He is now feeling better since he has been admitted to hospital with a nasogastric tube placed.    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) 10/26/2020   • Benign hypertension with CKD (chronic kidney disease) stage III ([facility]) 10/26/2020   • Bruises easily    • CHF (congestive heart failure) ([facility])    • Chronic bronchitis with COPD (chronic obstructive pulmonary disease) ([facility]) 10/26/2020   • Chronic respiratory failure with hypoxia ([facility]) 10/26/2020   • Congenital heart defect    • Difficulty attaining erection    • Heart block    • Hernia of abdominal wall    • Hypertension    • Low back pain    • Pacemaker    • Peripheral vascular disease due to secondary diabetes (HCC) 10/26/2020   • Poor circulation    • Prostate cancer (HCC)     prostate   • Shortness of breath    • Sleep apnea     using CPAP     • Stage 3a chronic kidney disease ([facility]) 10/26/2020   • Stented coronary artery 12/05/2019    MICHAEL to LAD   • Type 2 diabetes, controlled, with neuropathy ([facility]) 10/26/2020    no meds   • Type 2 diabetes, controlled, with neuropathy ([facility]) 10/26/2020     Past Surgical History:   Procedure Laterality Date   • ABDOMINAL SURGERY     • APPENDECTOMY     • BONE CURETTAGE Right 1/22/2021    Procedure: Wound excision with fifth metatarsal head resection, right foot.;  Surgeon: Josef Egan DPM;  Location: [facility] MAIN OR;  Service: Podiatry;  Laterality: Right;   • BONE INCISION AND DRAINAGE Right 10/5/2020    Procedure: Incision and drainage with debridement of all nonviable soft tissue and bone with bone biopsy, right foot.;  Surgeon: Josef Egan DPM;  Location: [facility] MAIN OR;  Service: Podiatry;  Laterality: Right;   • BRONCHOSCOPY N/A 4/23/2022    Procedure: BRONCHOSCOPY with bronchioalveolar lavage of right lower lobe;  Surgeon: Edwin Kline MD;  Location: [facility] ENDOSCOPY;  Service: Pulmonary;  Laterality: N/A;  pneumonia   • CARDIAC CATHETERIZATION N/A 12/5/2019    Procedure: Left Heart Cath;  Surgeon: Mirza Munson MD;  Location: [facility] CATH INVASIVE LOCATION;  Service: Cardiology   • CARDIAC CATHETERIZATION N/A 12/5/2019    Procedure: Stent MICHAEL coronary;  Surgeon: Mirza Munson MD;  Location: [facility] CATH INVASIVE LOCATION;  Service: Cardiology   • CARDIAC CATHETERIZATION N/A 10/18/2021    Procedure: Left Heart Cath and coronary angiogram;  Surgeon: Celine Swanson MD;  Location: [facility] CATH INVASIVE LOCATION;  Service: Cardiovascular;  Laterality: N/A;   • CARDIAC CATHETERIZATION N/A 10/18/2021    Procedure: Right Heart Cath;  Surgeon: Celine Swanson MD;  Location: [facility] CATH INVASIVE LOCATION;  Service: Cardiovascular;  Laterality: N/A;   • CHOLECYSTECTOMY     • ELBOW PROCEDURE      left   • ENDOSCOPY N/A 7/6/2022    Procedure: ESOPHAGOGASTRODUODENOSCOPY with biopsy x 1  area. bicap cautery, scelero therapy.;  Surgeon: Douglas Delgadillo MD;  Location: [facility] ENDOSCOPY;  Service: Gastroenterology;  Laterality: N/A;  ESOPHAGITIS, ANTREL ULCER, DUODENAL ULCER   • ENDOSCOPY N/A 11/27/2022    Procedure: ESOPHAGOGASTRODUODENOSCOPY with argon plasma coagulation of small bowel arteriovenous malformation;  Surgeon: Douglas Delgadillo MD;  Location: [facility] ENDOSCOPY;  Service: Gastroenterology;  Laterality: N/A;  post: small bowel AVM   • FOOT SURGERY      right foot   • HARDWARE REMOVAL Left 10/18/2022    Procedure: ELBOW HARDWARE REMOVAL;  Surgeon: Brad Jackson MD;  Location: [facility] MAIN OR;  Service: Orthopedics;  Laterality: Left;   • HERNIA REPAIR     • INCISION AND DRAINAGE OF WOUND Right 4/6/2021    Procedure: INCISION AND DRAINAGE OF RIGHT FOOT 5TH METATARSAL TO BONE AND PARTIAL 5TH METATARSAL RESECTION;  Surgeon: Josef Egan DPM;  Location: [facility] MAIN OR;  Service: Podiatry;  Laterality: Right;   • INCISION AND DRAINAGE OF WOUND Right 4/8/2021    Procedure: INCISION AND DRAINAGE BONE, DELAYED PRIMARY CLOSURE;  Surgeon: Josef Egan DPM;  Location: [facility] MAIN OR;  Service: Podiatry;  Laterality: Right;   • INTERVENTIONAL RADIOLOGY PROCEDURE N/A 12/5/2019    Procedure: Intravascular Ultrasound;  Surgeon: Mirza Munson MD;  Location: [facility] CATH INVASIVE LOCATION;  Service: Cardiology   • PACEMAKER IMPLANTATION     • MD RT/LT HEART CATHETERS N/A 12/5/2019    Procedure: Percutaneous Coronary Intervention;  Surgeon: Mirza Munson MD;  Location: [facility] CATH INVASIVE LOCATION;  Service: Cardiology   • WOUND DEBRIDEMENT Right 10/29/2020    Procedure: Preparation and debridement of foot wound with application of Integra wound graft, right foot.;  Surgeon: Josef Egan DPM;  Location: [facility] MAIN OR;  Service: Podiatry;  Laterality: Right;   • WOUND DEBRIDEMENT N/A 7/29/2021    Procedure: EXCISIONAL ABDOMINAL WOUND  DEBRIDEMENT;  Surgeon:  Cleopatra Wang MD;  Location: [facility] MAIN OR;  Service: General;  Laterality: N/A;   • WOUND DEBRIDEMENT N/A 2021    Procedure: DEBRIDEMENT OF ABDOMINAL WALL;  Surgeon: Hill Bhatia DO;  Location: [facility] MAIN OR;  Service: General;  Laterality: N/A;     Family History   Problem Relation Age of Onset   • Alzheimer's disease Mother    • GI problems Father    • Heart disease Father      Social History     Tobacco Use   • Smoking status: Former     Years: 15.00     Types: Cigarettes     Start date: 1966     Quit date: 5/10/2020     Years since quittin.7   • Smokeless tobacco: Never   Vaping Use   • Vaping Use: Never used   Substance Use Topics   • Alcohol use: Not Currently   • Drug use: No     (Not in a hospital admission)    apixaban, 5 mg, Oral, BID  atorvastatin, 40 mg, Oral, QAM  docusate sodium, 200 mg, Oral, BID  enoxaparin, 120 mg, Subcutaneous, Q12H  escitalopram, 10 mg, Oral, Daily  famotidine, 20 mg, Intravenous, Daily  famotidine, 20 mg, Oral, BID  glipizide, 5 mg, Oral, QAM AC  hydrOXYzine, 25 mg, Oral, Daily  insulin lispro, 2-9 Units, Subcutaneous, Q6H  isosorbide mononitrate, 120 mg, Oral, Q24H  metoclopramide, 10 mg, Intravenous, Q6H  metOLazone, 5 mg, Oral, Once per day on   metoprolol tartrate, 50 mg, Oral, BID With Meals  pregabalin, 150 mg, Oral, BID  QUEtiapine, 50 mg, Oral, Nightly  ranolazine, 1,000 mg, Oral, Q12H  rOPINIRole, 1 mg, Oral, Daily  senna-docusate sodium, 2 tablet, Oral, BID  sodium chloride, 3 mL, Intravenous, Q12H  sucralfate, 1 g, Oral, 4x Daily  traZODone, 100 mg, Oral, Nightly      Pharmacy to Dose enoxaparin (LOVENOX),   sodium chloride, 50 mL/hr, Last Rate: 50 mL/hr (23 1207)      •  acetaminophen  •  senna-docusate sodium **AND** polyethylene glycol **AND** bisacodyl **AND** bisacodyl  •  dextrose  •  dextrose  •  glucagon (human recombinant)  •  hydrALAZINE  •  HYDROmorphone  •  ipratropium-albuterol  •  metoprolol tartrate  •  ondansetron  "**OR** ondansetron  •  oxyCODONE  •  Pharmacy to Dose enoxaparin (LOVENOX)  •  [COMPLETED] Insert Peripheral IV **AND** sodium chloride  •  sodium chloride  •  sodium chloride  Haloperidol, Morphine, Pantoprazole, and Latex    Review of Systems   Constitutional: Negative for chills and fever.   Respiratory: Negative for chest tightness and shortness of breath.    Cardiovascular: Negative for chest pain.   Gastrointestinal: Positive for abdominal distention, abdominal pain, nausea and vomiting.        Objective     Vital Signs and Labs:  Vital Signs Patient Vitals for the past 24 hrs:   BP Temp Pulse Resp SpO2 Height Weight   02/06/23 1217 135/56 -- 70 19 91 % -- --   02/06/23 1202 133/55 -- 70 -- 92 % -- --   02/06/23 1147 125/56 -- 70 -- 91 % -- --   02/06/23 1132 119/52 -- 70 -- 90 % -- --   02/06/23 1027 121/61 -- 72 19 92 % -- --   02/06/23 0931 126/58 -- 70 -- 91 % -- --   02/06/23 0919 (!) 153/126 -- 71 -- 91 % -- --   02/06/23 0859 140/65 -- 82 -- (!) 79 % -- --   02/06/23 0844 125/49 -- 70 -- 95 % -- --   02/06/23 0831 123/61 -- 77 -- 92 % -- --   02/06/23 0726 -- -- -- -- -- -- 121 kg (265 lb 12.8 oz)   02/06/23 0655 132/79 97.8 °F (36.6 °C) 111 19 96 % 185.4 cm (73\") --       Physical Exam:  Physical Exam  Constitutional:       General: He is not in acute distress.     Appearance: He is ill-appearing.   HENT:      Head: Normocephalic and atraumatic.      Comments: On supplemental oxygen  Cardiovascular:      Rate and Rhythm: Normal rate.   Pulmonary:      Effort: Pulmonary effort is normal. No respiratory distress.   Abdominal:      Comments: Complete loss of abdominal domain has some small skin scabbing in the center of his scar abdomen is soft general diffuse tenderness to palpate   Musculoskeletal:         General: Swelling present. No tenderness.   Skin:     General: Skin is warm and dry.   Neurological:      General: No focal deficit present.      Mental Status: Mental status is at baseline. "   Psychiatric:         Mood and Affect: Mood normal.         Behavior: Behavior normal.         CBC    Results from last 7 days   Lab Units 02/06/23  0750   WBC 10*3/mm3 13.60*   HEMOGLOBIN g/dL 12.0*   PLATELETS 10*3/mm3 543*     CMP   Results from last 7 days   Lab Units 02/06/23  0750   SODIUM mmol/L 136   POTASSIUM mmol/L 4.6   CHLORIDE mmol/L 95*   CO2 mmol/L 26.0   BUN mg/dL 34*   CREATININE mg/dL 1.96*   GLUCOSE mg/dL 235*   ALBUMIN g/dL 3.8   BILIRUBIN mg/dL 0.6   ALK PHOS U/L 153*   AST (SGOT) U/L 15   ALT (SGPT) U/L 5   LIPASE U/L 18     Radiology(recent) CT Abdomen Pelvis Without Contrast    Result Date: 2/6/2023  Impression: 1. Findings consistent with the appearance of high-grade small bowel obstruction. 2. There is laxity of the anterior abdominal wall, with transition zone of decompressed small bowel thought to be within right lower quadrant pannus formation. There are 3. 3.5 cm fusiform infrarenal abdominal aortic aneurysm. 4. Partial staghorn calculus in the left kidney. No ureteral stone. No hydronephrosis. 5. Dense patchy posterior bilateral lower lobe airspace disease may represent pneumonia or sequelae of aspiration. Electronically Signed: Ekaterina Goins  2/6/2023 10:04 AM EST  Workstation ID: [id number]      I reviewed the patient's new clinical results.  I reviewed the patient's new imaging results and agree with the interpretation.    Assessment & Plan       Small bowel obstruction (HCC)      77 y.o. male admitted with acute on chronic bowel obstruction in her operable hernia has been confirmed by multiple surgeons    Supportive measures medical management.  Nasogastric tube decompression  when things open up will anticipate removal and trial at p.o. challenge I have also introduced some topics that potentially would.  Put him in a very difficult position including perforation of the bowel.  If a emergent surgical intervention was needed talk to them about the reality of his expected recovery.   Alternatively have asked them to consider talking about advanced directives in this scenario.       This note was created using Dragon Voice Recognition software.    Connor Galindo MD  02/06/23  13:04 EST

## 2023-02-06 NOTE — DISCHARGE PLACEMENT REQUEST
"Ro Sidhu (77 y.o. Male)     Date of Birth   1945    Social Security Number       Address   [address] [place] [address] [place] [facility]    Home Phone   [phone]    MRN   [id number]       Amish   Yazdanism    Marital Status                               Admission Date   2/6/23    Admission Type   Emergency    Admitting Provider   Jeane Sanders MD    Attending Provider   Yusuf Jones MD    Department, Room/Bed   [facility] EMERGENCY DEPARTMENT, 27/27       Discharge Date       Discharge Disposition       Discharge Destination                               Attending Provider: Yusuf Jones MD    Allergies: Haloperidol, Morphine, Pantoprazole, Latex    Isolation: None   Infection: MRSA No Isolation this Admit (10/13/22)   Code Status: CPR    Ht: 185.4 cm (73\")   Wt: 121 kg (265 lb 12.8 oz)    Admission Cmt: None   Principal Problem: None                Active Insurance as of 2/6/2023     Primary Coverage     Payor Plan Insurance Group Employer/Plan Group    HUMANA MEDICARE REPLACEMENT HU[employer]NA MEDICARE REPLACEMENT [id number]     Payor Plan Address Payor Plan Phone Number Payor Plan Fax Number Effective Dates    [address] [address] [phone]  1/1/2018 - None Entered    [facility] [id number]       Subscriber Name Subscriber Birth Date Member ID       RO SIDHU BETH 1945 [id number]                 Emergency Contacts      (Rel.) Home Phone Work Phone Mobile Phone    LONI SIDHU (Spouse) [phone] -- [phone]    Tierra Valero (Daughter) -- -- [phone]              "

## 2023-02-06 NOTE — H&P
Patient Care Team:  Yusuf Jones MD as PCP - General  [facility], Joshua Nuñez MD as Consulting Physician (Cardiology)  Shirley, Moise Kathleen MD as Consulting Physician (Cardiac Electrophysiology)  Izzy Alvarez MD as Consulting Physician (Nephrology)  Celine Swanson MD as Consulting Physician (Cardiology)    Chief complaint Abdomen pain    Subjective     Patient is a 77 y.o. male who presents with history of recurrent small bowel obstruction secondary to inoperable massive ventral herniation, hypertension with CKD, CHF, chronic low back pain, pacemaker, peripheral vascular disease secondary to diabetes, chronic kidney disease stage III, sleep apnea with CPAP, type 2 diabetes, alcoholic cirrhosis of the liver. Patient present with abdomen pain and vomiting. CT does show high grade small bowel obstruction. He will be admitted for further treatment     Review of Systems   Constitutional: Positive for activity change, appetite change and fatigue.   HENT: Negative.    Respiratory: Negative.    Cardiovascular: Negative.    Gastrointestinal: Positive for abdominal distention and abdominal pain.   Genitourinary: Negative.    Musculoskeletal: Negative.    Neurological: Positive for weakness.   Psychiatric/Behavioral: Negative.           History  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) 10/26/2020   • Benign hypertension with CKD (chronic kidney disease) stage III ([facility]) 10/26/2020   • Bruises easily    • CHF (congestive heart failure) ([facility])    • Chronic bronchitis with COPD (chronic obstructive pulmonary disease) ([facility]) 10/26/2020   • Chronic respiratory failure with hypoxia ([facility]) 10/26/2020   • Congenital heart defect    • Difficulty attaining erection    • Heart block    • Hernia of abdominal wall    • Hypertension    • Low back pain    • Pacemaker    • Peripheral vascular disease due to secondary diabetes ([facility]) 10/26/2020   • Poor circulation    •  Prostate cancer (HCC)     prostate   • Shortness of breath    • Sleep apnea     using CPAP    • Stage 3a chronic kidney disease ([facility]) 10/26/2020   • Stented coronary artery 12/05/2019    MICHAEL to LAD   • Type 2 diabetes, controlled, with neuropathy ([facility]) 10/26/2020    no meds   • Type 2 diabetes, controlled, with neuropathy ([facility]) 10/26/2020     Past Surgical History:   Procedure Laterality Date   • ABDOMINAL SURGERY     • APPENDECTOMY     • BONE CURETTAGE Right 1/22/2021    Procedure: Wound excision with fifth metatarsal head resection, right foot.;  Surgeon: Josef Egan DPM;  Location: [facility] MAIN OR;  Service: Podiatry;  Laterality: Right;   • BONE INCISION AND DRAINAGE Right 10/5/2020    Procedure: Incision and drainage with debridement of all nonviable soft tissue and bone with bone biopsy, right foot.;  Surgeon: Josef Egan DPM;  Location: [facility] MAIN OR;  Service: Podiatry;  Laterality: Right;   • BRONCHOSCOPY N/A 4/23/2022    Procedure: BRONCHOSCOPY with bronchioalveolar lavage of right lower lobe;  Surgeon: Edwin Kline MD;  Location: [facility] ENDOSCOPY;  Service: Pulmonary;  Laterality: N/A;  pneumonia   • CARDIAC CATHETERIZATION N/A 12/5/2019    Procedure: Left Heart Cath;  Surgeon: Mirza Munson MD;  Location: [facility] CATH INVASIVE LOCATION;  Service: Cardiology   • CARDIAC CATHETERIZATION N/A 12/5/2019    Procedure: Stent MICHAEL coronary;  Surgeon: Mirza Munson MD;  Location: [facility] CATH INVASIVE LOCATION;  Service: Cardiology   • CARDIAC CATHETERIZATION N/A 10/18/2021    Procedure: Left Heart Cath and coronary angiogram;  Surgeon: Celine Swanson MD;  Location: [facility] CATH INVASIVE LOCATION;  Service: Cardiovascular;  Laterality: N/A;   • CARDIAC CATHETERIZATION N/A 10/18/2021    Procedure: Right Heart Cath;  Surgeon: Celine Swanson MD;  Location: [facility] CATH INVASIVE LOCATION;  Service: Cardiovascular;  Laterality: N/A;   • CHOLECYSTECTOMY     • ELBOW PROCEDURE       left   • ENDOSCOPY N/A 7/6/2022    Procedure: ESOPHAGOGASTRODUODENOSCOPY with biopsy x 1 area. bicap cautery, scelero therapy.;  Surgeon: Douglas Delgadillo MD;  Location: [facility] ENDOSCOPY;  Service: Gastroenterology;  Laterality: N/A;  ESOPHAGITIS, ANTREL ULCER, DUODENAL ULCER   • ENDOSCOPY N/A 11/27/2022    Procedure: ESOPHAGOGASTRODUODENOSCOPY with argon plasma coagulation of small bowel arteriovenous malformation;  Surgeon: Douglas Delgadillo MD;  Location: [facility] ENDOSCOPY;  Service: Gastroenterology;  Laterality: N/A;  post: small bowel AVM   • FOOT SURGERY      right foot   • HARDWARE REMOVAL Left 10/18/2022    Procedure: ELBOW HARDWARE REMOVAL;  Surgeon: Brad Jackson MD;  Location: [facility] MAIN OR;  Service: Orthopedics;  Laterality: Left;   • HERNIA REPAIR     • INCISION AND DRAINAGE OF WOUND Right 4/6/2021    Procedure: INCISION AND DRAINAGE OF RIGHT FOOT 5TH METATARSAL TO BONE AND PARTIAL 5TH METATARSAL RESECTION;  Surgeon: Josef Egan DPM;  Location: [facility] MAIN OR;  Service: Podiatry;  Laterality: Right;   • INCISION AND DRAINAGE OF WOUND Right 4/8/2021    Procedure: INCISION AND DRAINAGE BONE, DELAYED PRIMARY CLOSURE;  Surgeon: Josef Egan DPM;  Location: [facility] MAIN OR;  Service: Podiatry;  Laterality: Right;   • INTERVENTIONAL RADIOLOGY PROCEDURE N/A 12/5/2019    Procedure: Intravascular Ultrasound;  Surgeon: Mirza Munson MD;  Location: [facility] CATH INVASIVE LOCATION;  Service: Cardiology   • PACEMAKER IMPLANTATION     • MA RT/LT HEART CATHETERS N/A 12/5/2019    Procedure: Percutaneous Coronary Intervention;  Surgeon: Mirza Munson MD;  Location: [facility] CATH INVASIVE LOCATION;  Service: Cardiology   • WOUND DEBRIDEMENT Right 10/29/2020    Procedure: Preparation and debridement of foot wound with application of Integra wound graft, right foot.;  Surgeon: Josef Egan DPM;  Location: [facility] MAIN OR;  Service: Podiatry;  Laterality: Right;   • WOUND  DEBRIDEMENT N/A 2021    Procedure: EXCISIONAL ABDOMINAL WOUND  DEBRIDEMENT;  Surgeon: Cleopatra Wang MD;  Location: [facility] MAIN OR;  Service: General;  Laterality: N/A;   • WOUND DEBRIDEMENT N/A 2021    Procedure: DEBRIDEMENT OF ABDOMINAL WALL;  Surgeon: Hill Bhatia DO;  Location: [facility] MAIN OR;  Service: General;  Laterality: N/A;     Family History   Problem Relation Age of Onset   • Alzheimer's disease Mother    • GI problems Father    • Heart disease Father      Social History     Tobacco Use   • Smoking status: Former     Years: 15.00     Types: Cigarettes     Start date: 1966     Quit date: 5/10/2020     Years since quittin.7   • Smokeless tobacco: Never   Vaping Use   • Vaping Use: Never used   Substance Use Topics   • Alcohol use: Not Currently   • Drug use: No     (Not in a hospital admission)    Allergies:  Haloperidol, Morphine, Pantoprazole, and Latex    Objective     Vital Signs  Temp:  [97.8 °F (36.6 °C)] 97.8 °F (36.6 °C)  Heart Rate:  [] 72  Resp:  [19] 19  BP: (121-153)/() 121/61       Physical Exam  Vitals reviewed.   Constitutional:       Appearance: He is obese. He is ill-appearing.   HENT:      Head: Normocephalic and atraumatic.      Right Ear: External ear normal.      Left Ear: External ear normal.      Nose: Nose normal.      Mouth/Throat:      Mouth: Mucous membranes are dry.   Eyes:      General:         Right eye: No discharge.         Left eye: No discharge.   Cardiovascular:      Rate and Rhythm: Normal rate and regular rhythm.      Pulses: Normal pulses.      Heart sounds: Normal heart sounds.   Pulmonary:      Effort: Pulmonary effort is normal.      Breath sounds: Normal breath sounds.   Abdominal:      General: Bowel sounds are normal.      Palpations: Abdomen is soft.   Musculoskeletal:         General: Normal range of motion.      Cervical back: Normal range of motion.   Skin:     General: Skin is warm.      Coloration: Skin is pale.    Neurological:      Mental Status: He is alert and oriented to person, place, and time.   Psychiatric:         Behavior: Behavior normal.          Results Review:     Imaging Results (Last 24 Hours)     Procedure Component Value Units Date/Time    CT Abdomen Pelvis Without Contrast [[id number]] Collected: 02/06/23 0959     Updated: 02/06/23 1007    Narrative:      CT ABDOMEN PELVIS WO CONTRAST    Date of Exam: 2/6/2023 9:48 AM EST    Indication: vomiting, hx bowel obstruct..  Chronic kidney disease stage III.    Comparison: CT abdomen and pelvis 12/7/2022.    Technique: Axial CT images were obtained of the abdomen and pelvis without the administration of contrast. Sagittal and coronal reconstructions were performed.  Automated exposure control and iterative reconstruction methods were used. IV contrast could   not be administered secondary to patient's impaired renal function.    Findings:  Dense posterior bilateral lower lobe airspace disease is new since prior examination, suggesting pneumonia or sequelae of aspiration. Heart size is within normal limits with signs of prior CABG. Coronary artery calcifications are present.    Staghorn calculus in the left lower renal pole is again noted, on series 3 image 67 measuring 2.0 x 0.7 cm. No right renal cyst. No ureteral stone or hydronephrosis. Low-density right renal lesion measuring 2.3 cm is favored to represent a cyst.    Noncontrast appearance of the liver, spleen, pancreas, adrenals is within normal limits. Cholecystectomy changes are present.    Severe abnormal dilation of small bowel with fecal material and air fluid levels, consistent with the appearance of a high-grade small bowel obstruction. Transition zone is thought most likely to be within the right lower quadrant abdomen anteriorly,   protruding within an area of abdominal wall laxity in the patient's pannus formation.    No pneumatosis or free air or free fluid is identified. There is moderately  advanced abdominal aortic calcific atherosclerosis. 3.5 cm fusiform infrarenal abdominal aortic aneurysm is seen (series 3 image 72).    A clip or implant is seen within a nonenlarged prostate gland. Urinary bladder is decompressed. Rectum is normal.    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 Moderate facet arthropathy is present at multiple lumbar levels. No acute osseous abnormal normalities are identified.      Impression:      Impression:    1. Findings consistent with the appearance of high-grade small bowel obstruction.  2. There is laxity of the anterior abdominal wall, with transition zone of decompressed small bowel thought to be within right lower quadrant pannus formation.  There are 3. 3.5 cm fusiform infrarenal abdominal aortic aneurysm.  4. Partial staghorn calculus in the left kidney. No ureteral stone. No hydronephrosis.  5. Dense patchy posterior bilateral lower lobe airspace disease may represent pneumonia or sequelae of aspiration.    Electronically Signed: Ekaterina Goins    2/6/2023 10:04 AM EST    Workstation ID: [id number]           Lab Results (last 24 hours)     Procedure Component Value Units Date/Time    POC Lactate [[id number]]  (Normal) Collected: 02/06/23 1102    Specimen: Blood Updated: 02/06/23 1103     Lactate 1.0 mmol/L      Comment: Serial Number: [id number]  [id number]       Blood Culture - Blood, Arm, Right [[id number]] Collected: 02/06/23 1048    Specimen: Blood from Arm, Right Updated: 02/06/23 1052    Urine Culture - Urine, Urine, Clean Catch [[id number]] Collected: 02/06/23 0902    Specimen: Urine, Clean Catch Updated: 02/06/23 1044    Urinalysis, Microscopic Only - Urine, Clean Catch [[id number]]  (Abnormal) Collected: 02/06/23 0902    Specimen: Urine, Clean Catch Updated: 02/06/23 0926     RBC, UA 0-2 /HPF      WBC, UA 0-2 /HPF      Bacteria, UA Trace /HPF      Squamous Epithelial Cells, UA 0-2 /HPF      Hyaline Casts, UA 3-6 /LPF       Methodology Manual Light Microscopy    Urinalysis With Microscopic If Indicated (No Culture) - Urine, Clean Catch [[id number]]  (Abnormal) Collected: 02/06/23 0902    Specimen: Urine, Clean Catch Updated: 02/06/23 0917     Color, UA Dark Yellow     Appearance, UA Clear     pH, UA <=5.0     Specific Gravity, UA 1.025     Glucose, UA Negative     Ketones, UA Trace     Bilirubin, UA Small (1+)     Comment: Confirmation testing is unavailable.  A serum bilirubin is recommended for further assessment.        Blood, UA Negative     Protein, UA Trace     Leuk Esterase, UA Small (1+)     Nitrite, UA Negative     Urobilinogen, UA 0.2 E.U./dL    Comprehensive Metabolic Panel [[id number]]  (Abnormal) Collected: 02/06/23 0750    Specimen: Blood Updated: 02/06/23 0903     Glucose 235 mg/dL      BUN 34 mg/dL      Creatinine 1.96 mg/dL      Sodium 136 mmol/L      Potassium 4.6 mmol/L      Comment: Slight hemolysis detected by analyzer. Results may be affected.        Chloride 95 mmol/L      CO2 26.0 mmol/L      Calcium 10.0 mg/dL      Total Protein 8.1 g/dL      Albumin 3.8 g/dL      ALT (SGPT) 5 U/L      AST (SGOT) 15 U/L      Comment: Slight hemolysis detected by analyzer. Results may be affected.        Alkaline Phosphatase 153 U/L      Total Bilirubin 0.6 mg/dL      Globulin 4.3 gm/dL      A/G Ratio 0.9 g/dL      BUN/Creatinine Ratio 17.3     Anion Gap 15.0 mmol/L      eGFR 34.8 mL/min/1.73     Narrative:      GFR Normal >60  Chronic Kidney Disease <60  Kidney Failure <15    The GFR formula is only valid for adults with stable renal function between ages 18 and 70.    Troponin [[id number]]  (Normal) Collected: 02/06/23 0750    Specimen: Blood Updated: 02/06/23 0841     Troponin T 0.012 ng/mL     Narrative:      Troponin T Reference Range:  <= 0.03 ng/mL-   Negative for AMI  >0.03 ng/mL-     Abnormal for myocardial necrosis.  Clinicians would have to utilize clinical acumen, EKG, Troponin and serial changes to determine if it is  an Acute Myocardial Infarction or myocardial injury due to an underlying chronic condition.       Results may be falsely decreased if patient taking Biotin.      Lipase [[id number]]  (Normal) Collected: 02/06/23 0750    Specimen: Blood Updated: 02/06/23 0841     Lipase 18 U/L     Magnesium [[id number]]  (Normal) Collected: 02/06/23 0750    Specimen: Blood Updated: 02/06/23 0841     Magnesium 2.3 mg/dL     Manual Differential [[id number]]  (Abnormal) Collected: 02/06/23 0750    Specimen: Blood Updated: 02/06/23 0830     Neutrophil % 75.0 %      Lymphocyte % 4.0 %      Monocyte % 4.0 %      Basophil % 1.0 %      Bands %  13.0 %      Metamyelocyte % 1.0 %      Atypical Lymphocyte % 2.0 %      Neutrophils Absolute 11.97 10*3/mm3      Lymphocytes Absolute 0.82 10*3/mm3      Monocytes Absolute 0.54 10*3/mm3      Basophils Absolute 0.14 10*3/mm3      Anisocytosis Slight/1+     Elliptocytes Mod/2+     Poikilocytes Slight/1+     Toxic Granulation Slight/1+     Vacuolated Neutrophils Slight/1+     Platelet Morphology Normal    CBC & Differential [[id number]]  (Abnormal) Collected: 02/06/23 0750    Specimen: Blood Updated: 02/06/23 0830    Narrative:      The following orders were created for panel order CBC & Differential.  Procedure                               Abnormality         Status                     ---------                               -----------         ------                     CBC Auto Differential[[id number]]        Abnormal            Final result               Scan Slide[[id number]]                                       Final result                 Please view results for these tests on the individual orders.    Scan Slide [[id number]] Collected: 02/06/23 0750    Specimen: Blood Updated: 02/06/23 0830     Scan Slide --     Comment: See Manual Differential Results       CBC Auto Differential [[id number]]  (Abnormal) Collected: 02/06/23 0750    Specimen: Blood Updated: 02/06/23 0830     WBC 13.60 10*3/mm3      RBC  5.16 10*6/mm3      Hemoglobin 12.0 g/dL      Hematocrit 38.8 %      MCV 75.2 fL      MCH 23.3 pg      MCHC 31.0 g/dL      RDW 21.3 %      RDW-SD 59.5 fl      MPV 8.2 fL      Platelets 543 10*3/mm3     Narrative:      The previously reported component NRBC is no longer being reported. Previous result was 0.1 /100 WBC (Reference Range: 0.0-0.2 /100 WBC) on 2/6/2023 at 0802 EST.           I reviewed the patient's new clinical results.    Assessment & Plan       Small bowel obstruction (HCC)  -NPO  -supportive care  -NG if vomits  -not a surgical candidate due to large hernia    Anemia   COPD  Atrial fib  Ventral hernia  DM-II  Ss and lantus  Hyperglycemia adding lantus and meal time insulin  CKD stage 3  Diabetic nephropathy  Diabetic neuropathy    Will restart oral medication but hold while NPO will also add IV meds for htn and support       DVT prophylaxis:eliquis will hold and start lovenox while npo  GI prophylaxis:pepcid         I discussed the patient's findings and my recommendations with patient.     Julissa Gunn, APRN  02/06/23  11:48 EST

## 2023-02-06 NOTE — ED PROVIDER NOTES
"Subjective   History of Present Illness  Chief Complaint: Abdominal pain    Context: Patient is a pleasant 77-year-old obese  male presented to the emergency room today from home with reports of \"a blocked bowel\" that started last night.  Patient complains of generalized abdominal pain that he rates 8/10 with no relieving or aggravating factors.  Patient states that his symptoms started last night including the abdominal pain, abdominal distention and vomiting.  He denies fever, chest pain, shortness of breath and dysuria.  Patient reports history of several bowel obstructions in the past requiring several surgeries.  He states it has happened probably 7 or 8 times.  Patient also reports history of abdominal hernias.  Patient reports only allergy is morphine.  He denies use of drugs, alcohol or tobacco.    Duration: 12 hours    Timing: Waxes and wanes    Severity: Moderate to severe    Associated: Nausea, vomiting        Review of Systems   Constitutional: Positive for appetite change. Negative for fever.   HENT: Negative for congestion.    Respiratory: Negative for chest tightness and shortness of breath.    Cardiovascular: Negative for chest pain and palpitations.   Gastrointestinal: Positive for abdominal distention, abdominal pain, nausea and vomiting.   Genitourinary: Negative for dysuria.   Musculoskeletal: Negative for arthralgias and myalgias.   Neurological: Negative for dizziness.   Psychiatric/Behavioral: Negative for confusion.   All other systems reviewed and are negative.      Past Medical History:   Diagnosis Date   • Alcoholic cirrhosis of liver with ascites ([facility]) 10/26/2020   • Benign hypertension with CKD (chronic kidney disease) stage III ([facility]) 10/26/2020   • Bruises easily    • CHF (congestive heart failure) ([facility])    • Chronic bronchitis with COPD (chronic obstructive pulmonary disease) ([facility]) 10/26/2020   • Chronic respiratory failure with hypoxia ([facility]) 10/26/2020   • Congenital heart " defect    • Difficulty attaining erection    • Heart block    • Hernia of abdominal wall    • Hypertension    • Low back pain    • Pacemaker    • Peripheral vascular disease due to secondary diabetes ([facility]) 10/26/2020   • Poor circulation    • Prostate cancer ([facility])     prostate   • Shortness of breath    • Sleep apnea     using CPAP    • Stage 3a chronic kidney disease ([facility]) 10/26/2020   • Stented coronary artery 12/05/2019    MICHAEL to LAD   • Type 2 diabetes, controlled, with neuropathy ([facility]) 10/26/2020    no meds   • Type 2 diabetes, controlled, with neuropathy ([facility]) 10/26/2020       Allergies   Allergen Reactions   • Haloperidol Hives   • Morphine Itching   • Pantoprazole Other (See Comments)     Feels sick after receiving   • Latex Unknown - High Severity       Past Surgical History:   Procedure Laterality Date   • ABDOMINAL SURGERY     • APPENDECTOMY     • BONE CURETTAGE Right 1/22/2021    Procedure: Wound excision with fifth metatarsal head resection, right foot.;  Surgeon: Josef Egan DPM;  Location: [facility] MAIN OR;  Service: Podiatry;  Laterality: Right;   • BONE INCISION AND DRAINAGE Right 10/5/2020    Procedure: Incision and drainage with debridement of all nonviable soft tissue and bone with bone biopsy, right foot.;  Surgeon: Josef Egan DPM;  Location: [facility] MAIN OR;  Service: Podiatry;  Laterality: Right;   • BRONCHOSCOPY N/A 4/23/2022    Procedure: BRONCHOSCOPY with bronchioalveolar lavage of right lower lobe;  Surgeon: Edwin Kline MD;  Location: [facility] ENDOSCOPY;  Service: Pulmonary;  Laterality: N/A;  pneumonia   • CARDIAC CATHETERIZATION N/A 12/5/2019    Procedure: Left Heart Cath;  Surgeon: Mirza Munson MD;  Location: [facility] CATH INVASIVE LOCATION;  Service: Cardiology   • CARDIAC CATHETERIZATION N/A 12/5/2019    Procedure: Stent MICHAEL coronary;  Surgeon: Mirza Munson MD;  Location: [facility] CATH INVASIVE LOCATION;  Service: Cardiology   • CARDIAC  CATHETERIZATION N/A 10/18/2021    Procedure: Left Heart Cath and coronary angiogram;  Surgeon: Celine Swanson MD;  Location: [facility] CATH INVASIVE LOCATION;  Service: Cardiovascular;  Laterality: N/A;   • CARDIAC CATHETERIZATION N/A 10/18/2021    Procedure: Right Heart Cath;  Surgeon: Celine Swanson MD;  Location: [facility] CATH INVASIVE LOCATION;  Service: Cardiovascular;  Laterality: N/A;   • CHOLECYSTECTOMY     • ELBOW PROCEDURE      left   • ENDOSCOPY N/A 7/6/2022    Procedure: ESOPHAGOGASTRODUODENOSCOPY with biopsy x 1 area. bicap cautery, scelero therapy.;  Surgeon: Douglas Delgadillo MD;  Location: [facility] ENDOSCOPY;  Service: Gastroenterology;  Laterality: N/A;  ESOPHAGITIS, ANTREL ULCER, DUODENAL ULCER   • ENDOSCOPY N/A 11/27/2022    Procedure: ESOPHAGOGASTRODUODENOSCOPY with argon plasma coagulation of small bowel arteriovenous malformation;  Surgeon: Douglas Delgadillo MD;  Location: [facility] ENDOSCOPY;  Service: Gastroenterology;  Laterality: N/A;  post: small bowel AVM   • FOOT SURGERY      right foot   • HARDWARE REMOVAL Left 10/18/2022    Procedure: ELBOW HARDWARE REMOVAL;  Surgeon: Brad Jackson MD;  Location: [facility] MAIN OR;  Service: Orthopedics;  Laterality: Left;   • HERNIA REPAIR     • INCISION AND DRAINAGE OF WOUND Right 4/6/2021    Procedure: INCISION AND DRAINAGE OF RIGHT FOOT 5TH METATARSAL TO BONE AND PARTIAL 5TH METATARSAL RESECTION;  Surgeon: Josef Egan DPM;  Location: [facility] MAIN OR;  Service: Podiatry;  Laterality: Right;   • INCISION AND DRAINAGE OF WOUND Right 4/8/2021    Procedure: INCISION AND DRAINAGE BONE, DELAYED PRIMARY CLOSURE;  Surgeon: Josef Egan DPM;  Location: [facility] MAIN OR;  Service: Podiatry;  Laterality: Right;   • INTERVENTIONAL RADIOLOGY PROCEDURE N/A 12/5/2019    Procedure: Intravascular Ultrasound;  Surgeon: Mirza Munson MD;  Location: [facility] CATH INVASIVE LOCATION;  Service: Cardiology   • PACEMAKER IMPLANTATION     • MN RT/LT  HEART CATHETERS N/A 2019    Procedure: Percutaneous Coronary Intervention;  Surgeon: Mirza Munson MD;  Location: [facility] CATH INVASIVE LOCATION;  Service: Cardiology   • WOUND DEBRIDEMENT Right 10/29/2020    Procedure: Preparation and debridement of foot wound with application of Integra wound graft, right foot.;  Surgeon: Josef Egan DPM;  Location: [facility] MAIN OR;  Service: Podiatry;  Laterality: Right;   • WOUND DEBRIDEMENT N/A 2021    Procedure: EXCISIONAL ABDOMINAL WOUND  DEBRIDEMENT;  Surgeon: Cleopatra Wang MD;  Location: [facility] MAIN OR;  Service: General;  Laterality: N/A;   • WOUND DEBRIDEMENT N/A 2021    Procedure: DEBRIDEMENT OF ABDOMINAL WALL;  Surgeon: Hill Bhatia DO;  Location: [facility] MAIN OR;  Service: General;  Laterality: N/A;       Family History   Problem Relation Age of Onset   • Alzheimer's disease Mother    • GI problems Father    • Heart disease Father        Social History     Socioeconomic History   • Marital status:    Tobacco Use   • Smoking status: Former     Years: 15.00     Types: Cigarettes     Start date: 1966     Quit date: 5/10/2020     Years since quittin.7   • Smokeless tobacco: Never   Vaping Use   • Vaping Use: Never used   Substance and Sexual Activity   • Alcohol use: Not Currently   • Drug use: No   • Sexual activity: Defer           Objective   Physical Exam  Vitals and nursing note reviewed.   Constitutional:       General: He is awake. He is not in acute distress.     Appearance: Normal appearance. He is well-developed. He is obese. He is not ill-appearing.   HENT:      Head: Normocephalic and atraumatic. No raccoon eyes or Macdonald's sign.      Right Ear: Hearing, tympanic membrane and ear canal normal.      Left Ear: Hearing, tympanic membrane and ear canal normal.   Eyes:      General: Vision grossly intact. Gaze aligned appropriately.      Extraocular Movements: Extraocular movements intact.      Pupils: Pupils are  equal, round, and reactive to light.   Cardiovascular:      Rate and Rhythm: Regular rhythm. Tachycardia present.      Pulses: Normal pulses.      Heart sounds: Normal heart sounds. No murmur heard.  Pulmonary:      Effort: Pulmonary effort is normal. No respiratory distress.      Breath sounds: Normal breath sounds.   Abdominal:      General: Abdomen is protuberant. Bowel sounds are absent. There is distension.      Palpations: Abdomen is soft.      Tenderness: There is generalized abdominal tenderness. There is no right CVA tenderness or left CVA tenderness.      Hernia: A hernia is present. Hernia is present in the ventral area.   Musculoskeletal:         General: Normal range of motion.      Cervical back: Normal range of motion and neck supple.   Skin:     General: Skin is warm and dry.      Capillary Refill: Capillary refill takes less than 2 seconds.   Neurological:      General: No focal deficit present.      Mental Status: He is alert and oriented to person, place, and time. Mental status is at baseline.      GCS: GCS eye subscore is 4. GCS verbal subscore is 5. GCS motor subscore is 6.      Cranial Nerves: Cranial nerves 2-12 are intact.      Sensory: Sensation is intact.      Motor: Motor function is intact.      Deep Tendon Reflexes: Reflexes are normal and symmetric.   Psychiatric:         Mood and Affect: Mood normal.         Behavior: Behavior normal.         Procedures           ED Course  ED Course as of 02/06/23 1102   Mon Feb 06, 2023   0733 EKG reviewed by myself and read by Dr. Whitfield reveals ventricular paced rhythm with a regular rate of 72.  No acute ST elevation.  Previous EKG with A-fib/flutter V-paced complexes.  [SJ]   0832 Primary nurse informed me that patient was placed on 2 L nasal cannula. [SJ]   0859 Awaiting CMP results to send patient to CT. [SJ]      ED Course User Index  [SJ] Jeaneth Patel, KOURTNEY      /61 (BP Location: Left arm, Patient Position: Lying)   Pulse 72    "Temp 97.8 °F (36.6 °C)   Resp 19   Ht 185.4 cm (73\")   Wt 121 kg (265 lb 12.8 oz)   SpO2 92%   BMI 35.07 kg/m²   Labs Reviewed   COMPREHENSIVE METABOLIC PANEL - Abnormal; Notable for the following components:       Result Value    Glucose 235 (*)     BUN 34 (*)     Creatinine 1.96 (*)     Chloride 95 (*)     Alkaline Phosphatase 153 (*)     eGFR 34.8 (*)     All other components within normal limits    Narrative:     GFR Normal >60  Chronic Kidney Disease <60  Kidney Failure <15    The GFR formula is only valid for adults with stable renal function between ages 18 and 70.   URINALYSIS W/ MICROSCOPIC IF INDICATED (NO CULTURE) - Abnormal; Notable for the following components:    Color, UA Dark Yellow (*)     Ketones, UA Trace (*)     Bilirubin, UA Small (1+) (*)     Protein, UA Trace (*)     Leuk Esterase, UA Small (1+) (*)     All other components within normal limits   CBC WITH AUTO DIFFERENTIAL - Abnormal; Notable for the following components:    WBC 13.60 (*)     Hemoglobin 12.0 (*)     MCV 75.2 (*)     MCH 23.3 (*)     MCHC 31.0 (*)     RDW 21.3 (*)     RDW-SD 59.5 (*)     Platelets 543 (*)     All other components within normal limits    Narrative:     The previously reported component NRBC is no longer being reported. Previous result was 0.1 /100 WBC (Reference Range: 0.0-0.2 /100 WBC) on 2/6/2023 at 0802 EST.   MANUAL DIFFERENTIAL - Abnormal; Notable for the following components:    Lymphocyte % 4.0 (*)     Monocyte % 4.0 (*)     Bands %  13.0 (*)     Metamyelocyte % 1.0 (*)     Neutrophils Absolute 11.97 (*)     All other components within normal limits   URINALYSIS, MICROSCOPIC ONLY - Abnormal; Notable for the following components:    RBC, UA 0-2 (*)     WBC, UA 0-2 (*)     Bacteria, UA Trace (*)     All other components within normal limits   TROPONIN (IN-HOUSE) - Normal    Narrative:     Troponin T Reference Range:  <= 0.03 ng/mL-   Negative for AMI  >0.03 ng/mL-     Abnormal for myocardial necrosis.  " Clinicians would have to utilize clinical acumen, EKG, Troponin and serial changes to determine if it is an Acute Myocardial Infarction or myocardial injury due to an underlying chronic condition.       Results may be falsely decreased if patient taking Biotin.     LIPASE - Normal   MAGNESIUM - Normal   BLOOD CULTURE   BLOOD CULTURE   URINE CULTURE   SCAN SLIDE   POC LACTATE   CBC AND DIFFERENTIAL    Narrative:     The following orders were created for panel order CBC & Differential.  Procedure                               Abnormality         Status                     ---------                               -----------         ------                     CBC Auto Differential[[id number]]        Abnormal            Final result               Scan Slide[[id number]]                                       Final result                 Please view results for these tests on the individual orders.     Medications   sodium chloride 0.9 % flush 10 mL (has no administration in time range)   sodium chloride 0.9 % bolus 1,000 mL ( Intravenous Currently Infusing 2/6/23 0951)   ondansetron (ZOFRAN) injection 4 mg (4 mg Intravenous Given 2/6/23 0801)   HYDROmorphone (DILAUDID) injection 1 mg (1 mg Intravenous Given 2/6/23 0801)   HYDROmorphone (DILAUDID) injection 1 mg (1 mg Intravenous Given 2/6/23 1056)     CT Abdomen Pelvis Without Contrast    Result Date: 2/6/2023  Impression: 1. Findings consistent with the appearance of high-grade small bowel obstruction. 2. There is laxity of the anterior abdominal wall, with transition zone of decompressed small bowel thought to be within right lower quadrant pannus formation. There are 3. 3.5 cm fusiform infrarenal abdominal aortic aneurysm. 4. Partial staghorn calculus in the left kidney. No ureteral stone. No hydronephrosis. 5. Dense patchy posterior bilateral lower lobe airspace disease may represent pneumonia or sequelae of aspiration. Electronically Signed: Ekaterina Goins  2/6/2023  10:04 AM EST  Workstation ID: [id number]                                         Medical Decision Making  Patient was placed in a gown prior to assessment.  He is alert, nontoxic-appearing and hemodynamically stable.  He is not requiring supplemental oxygen at this time.  IV established labs were obtained.  Contrasted CT study ordered.  Patient was medicated with Zofran, Dilaudid and received a fluid bolus.    Labs reveal BUN of 34 and creatinine of 1.96 with estimated GFR 34.8.  This is baseline for the patient and he has a history of stage III chronic kidney disease.  White count is 13.6 with 13% bandemia.  Urinalysis with trace bacteria and leukocytes.  Urine culture ordered.  CT findings consistent with appearance of high-grade small bowel obstruction; laxity of anterior abdominal wall with transition zone decompressed small bowel thought to be within right lower quadrant of pannus formation with a 3.5 cm fusiform infrarenal abdominal aortic aneurysm.  I consulted with on-call surgeon, who states that patient is not a surgical candidate due to extensive history of small bowel obstruction and nonsurgical abdomen.  No further instruction on treatment of patient's condition was provided.  Chart review reveals that patient has had previous successful treatment of small bowel obstruction with NG tube decompression in December.  With this information, I ordered NG tube placement.  Upon my reassessment, patient is on 2 L nasal cannula due to complaints of shortness of breath due to pain and vomiting.  Appearance is improved from initial assessment and patient appears to be more comfortable.  He was informed of findings and order for NG tube placement.  Patient states that pain had initially resolved but was returning and it requesting more pain medication prior to NG tube placement.  Order was placed appropriately.  I discussed the patient with SACHA Costa with the [employer] group.  She accepted patient on behalf of   Tommy, who will be managing inpatient care.  Patient has remained alert, nontoxic-appearing and hemodynamically stable.  He is in no acute distress.    Chart Review: Patient has had several CT studies in the past with findings consistent with small bowel obstruction.  Patient was last diagnosed with a small bowel obstruction in December, when he was seen and followed by Dr. Ricks from general surgery.  At that time, small bowel obstruction was treated with NG tube decompression and advised to eat soft diet for prevention of small bowel obstruction in the future.    Comorbidities: Obesity, history of bowel obstruction, bilateral hernias  Differentials: Bowel obstruction, strangulated hernia, esophageal varices, not all inclusive of differentials considered.   Discussion with Provider: Dr. Rg  EKG reviewed by me and interpreted by Dr. Whitfield    Lab Interpretation: As above  Radiology Interpretation: As above    Appropriate PPE worn during exam.    This document is intended for medical expert use only.  Reading of this document by patients and/or patient's family without participating medical staff guidance may result in misinterpretation and unintended morbidity.  Any interpretation of such data is the responsibility of the patient and/or family member responsible for the patient in concert with their primary or specialist providers, not to be left for sources of online search as such as [employer], [employer] or similar queries.  Relying on these approaches to knowledge may result in misinterpretation, misguided goals of care and even death should patient or family members try recommendations outside of the realm of professional medical care in a supervised inpatient environment.    This medical document was created using Dragon dictation system. Some errors in speech recognition may occur.    Generalized abdominal pain: chronic illness or injury  Nausea and vomiting, unspecified vomiting type: acute illness or  injury  Small bowel obstruction (HCC): acute illness or injury  Amount and/or Complexity of Data Reviewed  Labs: ordered. Decision-making details documented in ED Course.  Radiology: ordered. Decision-making details documented in ED Course.  ECG/medicine tests: ordered. Decision-making details documented in ED Course.  Discussion of management or test interpretation with external provider(s): Dr. Galindo - general surgery: Patient is not a surgical candidate due to chronic history of abdominal issues, including bowel obstruction.    Risk  Prescription drug management.  Decision regarding hospitalization.    Risk Details: History of abdominal aortic aneurysm, complex ventral hernia, small bowel obstruction.        Final diagnoses:   Generalized abdominal pain   Nausea and vomiting, unspecified vomiting type   Small bowel obstruction (HCC)       ED Disposition  ED Disposition     ED Disposition   Decision to Admit    Condition   --    Comment   Level of Care: Telemetry [5]   Admitting Physician: ERNESTO WINTER [[id number]]   Attending Physician: ERNESTO WINTER [[id number]]               No follow-up provider specified.       Medication List      No changes were made to your prescriptions during this visit.          Jeaneth Patel, APRN  02/06/23 1102

## 2023-02-06 NOTE — PLAN OF CARE
Problem: Nausea and Vomiting  Goal: Fluid and Electrolyte Balance  Outcome: Ongoing, Progressing     Problem: Fluid Deficit (Intestinal Obstruction)  Goal: Fluid Balance  Outcome: Ongoing, Progressing     Problem: Infection (Intestinal Obstruction)  Goal: Absence of Infection Signs and Symptoms  Outcome: Ongoing, Progressing     Problem: Nausea and Vomiting (Intestinal Obstruction)  Goal: Nausea and Vomiting Relief  Outcome: Ongoing, Progressing     Problem: Pain (Intestinal Obstruction)  Goal: Acceptable Pain Control  Outcome: Ongoing, Progressing   Goal Outcome Evaluation: Patient is alert and oriented. NG Tube to LWS. C/O nausea. Vital signs wnl. Will continue to monitor..

## 2023-02-07 NOTE — PROGRESS NOTES
LOS: 1 day   Patient Care Team:  Yusuf Jones MD as PCP - General  PhiJoshua MD as Consulting Physician (Cardiology)  Moise Watson MD as Consulting Physician (Cardiac Electrophysiology)  Izzy Alvarez MD as Consulting Physician (Nephrology)  Celine Swanson MD as Consulting Physician (Cardiology)    Subjective:  Pain//disillusioned    Objective:   Afebrile      Review of Systems:   Review of Systems   Gastrointestinal: Positive for abdominal distention, abdominal pain and nausea.           Vital Signs  Temp:  [97.9 °F (36.6 °C)-98.4 °F (36.9 °C)] 97.9 °F (36.6 °C)  Heart Rate:  [70-82] 73  Resp:  [18-20] 18  BP: (119-153)/() 136/79    Physical Exam:  Physical Exam  Vitals and nursing note reviewed.   Cardiovascular:      Rate and Rhythm: Normal rate.      Heart sounds: Normal heart sounds.   Pulmonary:      Breath sounds: Normal breath sounds.   Skin:     General: Skin is warm.   Neurological:      Mental Status: He is alert.          Radiology:  CT Abdomen Pelvis Without Contrast    Result Date: 2/6/2023  Impression: 1. Findings consistent with the appearance of high-grade small bowel obstruction. 2. There is laxity of the anterior abdominal wall, with transition zone of decompressed small bowel thought to be within right lower quadrant pannus formation. There are 3. 3.5 cm fusiform infrarenal abdominal aortic aneurysm. 4. Partial staghorn calculus in the left kidney. No ureteral stone. No hydronephrosis. 5. Dense patchy posterior bilateral lower lobe airspace disease may represent pneumonia or sequelae of aspiration. Electronically Signed: Ekaterina Goins  2/6/2023 10:04 AM EST  Workstation ID: [id number]         Results Review:     I reviewed the patient's new clinical results.  I reviewed the patient's new imaging results and agree with the interpretation.    Medication Review:   Scheduled Meds:atorvastatin, 40 mg, Oral, QAM  docusate sodium, 200 mg, Oral,  BID  enoxaparin, 120 mg, Subcutaneous, Q12H  escitalopram, 10 mg, Oral, Daily  famotidine, 20 mg, Intravenous, Daily  glipizide, 5 mg, Oral, QAM AC  hydrOXYzine, 25 mg, Oral, Daily  insulin lispro, 2-9 Units, Subcutaneous, Q6H  isosorbide mononitrate, 120 mg, Oral, Q24H  metOLazone, 5 mg, Oral, Once per day on Mon Wed Fri  metoprolol tartrate, 50 mg, Oral, BID With Meals  pregabalin, 150 mg, Oral, BID  QUEtiapine, 50 mg, Oral, Nightly  ranolazine, 1,000 mg, Oral, Q12H  rOPINIRole, 1 mg, Oral, Daily  senna-docusate sodium, 2 tablet, Oral, BID  sodium chloride, 3 mL, Intravenous, Q12H  sucralfate, 1 g, Oral, 4x Daily  traZODone, 100 mg, Oral, Nightly      Continuous Infusions:Pharmacy to Dose enoxaparin (LOVENOX),   sodium chloride, 50 mL/hr, Last Rate: 50 mL/hr (02/07/23 0715)      PRN Meds:.•  acetaminophen  •  senna-docusate sodium **AND** polyethylene glycol **AND** bisacodyl **AND** bisacodyl  •  dextrose  •  dextrose  •  glucagon (human recombinant)  •  hydrALAZINE  •  HYDROmorphone  •  ipratropium-albuterol  •  metoprolol tartrate  •  ondansetron **OR** ondansetron  •  oxyCODONE  •  Pharmacy to Dose enoxaparin (LOVENOX)  •  promethazine  •  [COMPLETED] Insert Peripheral IV **AND** sodium chloride  •  sodium chloride  •  sodium chloride    Labs:    CBC    Results from last 7 days   Lab Units 02/07/23  0700 02/06/23  0750   WBC 10*3/mm3 10.20 13.60*   HEMOGLOBIN g/dL 11.3* 12.0*   PLATELETS 10*3/mm3 544* 543*     BMP   Results from last 7 days   Lab Units 02/07/23  0700 02/06/23  0750   SODIUM mmol/L 144 136   POTASSIUM mmol/L 3.9 4.6   CHLORIDE mmol/L 95* 95*   CO2 mmol/L 38.0* 26.0   BUN mg/dL 41* 34*   CREATININE mg/dL 1.90* 1.96*   GLUCOSE mg/dL 201* 235*   MAGNESIUM mg/dL  --  2.3     Cr Clearance Estimated Creatinine Clearance: 44.7 mL/min (A) (by C-G formula based on SCr of 1.9 mg/dL (H)).  Coag     HbA1C   Lab Results   Component Value Date    HGBA1C 7.3 (H) 10/12/2022    HGBA1C 8.5 (H) 06/02/2022     HGBA1C 8.5 (H) 04/19/2022     Blood Glucose   Glucose   Date/Time Value Ref Range Status   02/07/2023 0750 186 (H) 70 - 105 mg/dL Final     Comment:     Serial Number: [id number]:  [id number]   02/07/2023 0017 192 (H) 70 - 105 mg/dL Final     Comment:     Serial Number: [id number]:  [id number]   02/06/2023 1808 169 (H) 70 - 105 mg/dL Final     Comment:     Serial Number: [id number]:  [id number]   02/06/2023 1450 220 (H) 70 - 105 mg/dL Final     Comment:     Serial Number: [id number]:  [id number]     Infection     CMP   Results from last 7 days   Lab Units 02/07/23  0700 02/06/23  0750   SODIUM mmol/L 144 136   POTASSIUM mmol/L 3.9 4.6   CHLORIDE mmol/L 95* 95*   CO2 mmol/L 38.0* 26.0   BUN mg/dL 41* 34*   CREATININE mg/dL 1.90* 1.96*   GLUCOSE mg/dL 201* 235*   ALBUMIN g/dL  --  3.8   BILIRUBIN mg/dL  --  0.6   ALK PHOS U/L  --  153*   AST (SGOT) U/L  --  15   ALT (SGPT) U/L  --  5   LIPASE U/L  --  18     UA    Results from last 7 days   Lab Units 02/06/23  0902   NITRITE UA  Negative   WBC UA /HPF 0-2*   BACTERIA UA /HPF Trace*   SQUAM EPITHEL UA /HPF 0-2     Radiology(recent) CT Abdomen Pelvis Without Contrast    Result Date: 2/6/2023  Impression: 1. Findings consistent with the appearance of high-grade small bowel obstruction. 2. There is laxity of the anterior abdominal wall, with transition zone of decompressed small bowel thought to be within right lower quadrant pannus formation. There are 3. 3.5 cm fusiform infrarenal abdominal aortic aneurysm. 4. Partial staghorn calculus in the left kidney. No ureteral stone. No hydronephrosis. 5. Dense patchy posterior bilateral lower lobe airspace disease may represent pneumonia or sequelae of aspiration. Electronically Signed: Ekaterina Goins  2/6/2023 10:04 AM EST  Workstation ID: [id number]     Assessment:    Recurrent high-grade small bowel obstruction  MOSD  Inoperable massive ventral herniation  Peptic ulcer disease  History of erosive  gastritis  History of grade B esophagitis  Gastroesophageal reflux disease with esophagitis  Herbert's esophageal change  DM II with renal manifestations  DMII with angiopathic manifestations  DMII with neuropathic manifestations  Diabetic dyslipidemia  Panlobular COPD with emphysema  Nicotine dependency with nicotine use disorder, cigarettes  DDD L/S  CKD IIIa  HTN associated with CKD IIIa  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  Cirrhosis  Small vessel PAD secondary to diabetes  Diabetic lower extremity ulcerations  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, MOSD  History of right renal mass  3.4 cm infrarenal abdominal aortic aneurysm  Valvular heart disease with significant mitral regurgitation  Paroxysmal atrial fibrillation  Hypercoagulable state secondary to atrial fibrillation  Chronic oral anticoagulation therapy with direct thrombin inhibition  ACD  Chronic systolic congestive heart failure  Chronic hypoxic respiratory failure  Supplemental oxygen dependency  Chronic mucopurulent bronchitis  History of prostate cancer  Morbid exogenous obesity  Hypoventilation apnea syndrome with ARTI  Peripheral polyneuropathy secondary to alcohol and diabetes  Autonomic neuropathy secondary to diabetes with autonomic dysfunction syndrome  Monoclonal paraproteinemia  Narcotic dependency  Vitamin D deficiency  ETOHISM  H/O pacemaker placement    Plan:    Bowel rest//nasogastric decompression      Yusuf Jones MD  02/07/23  08:43 EST

## 2023-02-07 NOTE — PLAN OF CARE
Patient accidentally pulled out NGT this morning and per surgery we are to not replaced NGT at this time. Patient has had intermittent nausea, but has mostly been sleeping this afternoon. Clear liquid diet started and patient seems to be tolerating fine. No distress at this time, will continue to monitor.

## 2023-02-07 NOTE — PLAN OF CARE
Goal Outcome Evaluation:      Pt admitted from the ER with a SBO, pt has several hernias per General Surgery. Fluids running. Pt resting in bed with complaints of pain, PRN meds administered. Spouse at bedside. Will continue to monitor.

## 2023-02-07 NOTE — PROGRESS NOTES
General Surgery Progress Note    Name: Ro Nathan ADMIT: 2023   : 1945  PCP: Yusuf Jones MD    MRN: [id number] LOS: 1 days   AGE/SEX: 77 y.o. male  ROOM:    [facility]    Chief Complaint   Patient presents with   • Abdominal Pain     Subjective     77 y.o. male admitted with recurrent acute on chronic bowel obstruction    Overnight he says his nasogastric tube fell out he has been passing plenty of flatus no bowel movement yet abdominal pain is improving.  No new fevers or chills    Objective     Scheduled Medications:   atorvastatin, 40 mg, Oral, QAM  docusate sodium, 200 mg, Oral, BID  enoxaparin, 120 mg, Subcutaneous, Q12H  escitalopram, 10 mg, Oral, Daily  famotidine, 20 mg, Intravenous, Daily  glipizide, 5 mg, Oral, QAM AC  insulin lispro, 2-9 Units, Subcutaneous, Q6H  isosorbide mononitrate, 120 mg, Oral, Q24H  metoprolol tartrate, 50 mg, Oral, BID With Meals  pregabalin, 150 mg, Oral, BID  QUEtiapine, 50 mg, Oral, Nightly  ranolazine, 1,000 mg, Oral, Q12H  rOPINIRole, 1 mg, Oral, Daily  senna-docusate sodium, 2 tablet, Oral, BID  sodium chloride, 3 mL, Intravenous, Q12H        Active Infusions:  Pharmacy to Dose enoxaparin (LOVENOX),   sodium chloride, 50 mL/hr, Last Rate: 50 mL/hr (23 1136)        As Needed Medications:  •  acetaminophen  •  senna-docusate sodium **AND** polyethylene glycol **AND** bisacodyl **AND** bisacodyl  •  dextrose  •  dextrose  •  glucagon (human recombinant)  •  hydrALAZINE  •  HYDROmorphone  •  ipratropium-albuterol  •  metoprolol tartrate  •  ondansetron **OR** ondansetron  •  oxyCODONE  •  Pharmacy to Dose enoxaparin (LOVENOX)  •  promethazine  •  [COMPLETED] Insert Peripheral IV **AND** sodium chloride  •  sodium chloride  •  sodium chloride    Vital Signs  Vital Signs Patient Vitals for the past 24 hrs:   BP Temp Temp src Pulse Resp SpO2 Weight   23 1133 107/65 -- -- 78 -- -- --   23 1127 -- 98.1 °F (36.7 °C) Oral 70 15 93 % --    02/07/23 0700 136/79 97.9 °F (36.6 °C) Oral 73 18 93 % --   02/07/23 0040 -- -- -- -- -- 91 % --   02/07/23 0000 134/61 97.9 °F (36.6 °C) Oral 73 19 -- --   02/06/23 2024 132/47 -- -- 74 20 90 % --   02/06/23 1611 128/59 98.4 °F (36.9 °C) Oral 70 18 92 % 119 kg (261 lb 7.5 oz)       Physical Exam:  Physical Exam  Constitutional:       Appearance: Normal appearance.   HENT:      Head: Normocephalic and atraumatic.   Cardiovascular:      Rate and Rhythm: Normal rate.   Pulmonary:      Effort: Pulmonary effort is normal. No respiratory distress.   Abdominal:      Palpations: Abdomen is soft.      Comments: Minimal tenderness to palpation today   Skin:     General: Skin is warm and dry.   Neurological:      General: No focal deficit present.      Mental Status: He is alert. Mental status is at baseline.   Psychiatric:         Mood and Affect: Mood normal.         Behavior: Behavior normal.         Results Review:     CBC    Results from last 7 days   Lab Units 02/07/23  0700 02/06/23  0750   WBC 10*3/mm3 10.20 13.60*   HEMOGLOBIN g/dL 11.3* 12.0*   PLATELETS 10*3/mm3 544* 543*     BMP   Results from last 7 days   Lab Units 02/07/23  0700 02/06/23  0750   SODIUM mmol/L 144 136   POTASSIUM mmol/L 3.9 4.6   CHLORIDE mmol/L 95* 95*   CO2 mmol/L 38.0* 26.0   BUN mg/dL 41* 34*   CREATININE mg/dL 1.90* 1.96*   GLUCOSE mg/dL 201* 235*   MAGNESIUM mg/dL  --  2.3     Radiology(recent) CT Abdomen Pelvis Without Contrast    Result Date: 2/6/2023  Impression: 1. Findings consistent with the appearance of high-grade small bowel obstruction. 2. There is laxity of the anterior abdominal wall, with transition zone of decompressed small bowel thought to be within right lower quadrant pannus formation. There are 3. 3.5 cm fusiform infrarenal abdominal aortic aneurysm. 4. Partial staghorn calculus in the left kidney. No ureteral stone. No hydronephrosis. 5. Dense patchy posterior bilateral lower lobe airspace disease may represent  pneumonia or sequelae of aspiration. Electronically Signed: Ekaterina Samuelmaryjane  2/6/2023 10:04 AM EST  Workstation ID: [id number]      I reviewed the patient's new clinical results.  I reviewed the patient's new imaging results and agree with the interpretation.    Assessment & Plan       Small bowel obstruction (HCC)      77 y.o. male admitted with multiple recurrences of acute on chronic bowel obstruction secondary to large ventral hernia with complete loss of abdominal domain    Nasogastric tube fell out since he is passing flatus and feeling better okay to keep out from my standpoint  Okay for clear liquid diet        This note was created using Dragon Voice Recognition software.    Connor Galindo MD  02/07/23  13:21 EST

## 2023-02-07 NOTE — CASE MANAGEMENT/SOCIAL WORK
Continued Stay Note  KATLYN Devlin     Patient Name: Ro Nathan  MRN: [id number]  Today's Date: 2/7/2023    Admit Date: 2/6/2023    Plan: Has Home O2 through [place]. Current with [facility], but will need resumption of care orders. PT eval pending   Discharge Plan     Row Name 02/07/23 1355       Plan    Plan Has Home O2 through [facility]. Current with [facility], but will need resumption of care orders. PT eval pending    Plan Comments Pt confirms he plans to return home with [facility]. Will need resumption of  care orders. He currently denies other dc needs at this time.               Discharge Codes    No documentation.                 Jagruti Pool RN, [facility]  Office: [phone]  Fax: [phone]  [email or web address]      I met with patient in room wearing PPE: mask and goggles.     Maintained distance greater than six feet and spent </=15 minutes in the room      Jagruti Pool RN

## 2023-02-07 NOTE — PLAN OF CARE
Goal Outcome Evaluation:  Plan of Care Reviewed With: patient  Pt presents as a 78 y/o M admitted to [facility] on 2/7/23 with abdominal pain and vomiting. 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 Pt requiring O2 at 5 L and c/o 8/10 abdominal and back pain. Pt A and O x 4. At baseline, pt lives with spouse and uses a rolling walker for household mobility. Today, pt requiring CA of 1 for bed mobility; he declines a transfer to standing. PT recommendation at this time is Home Health Physical Therapy and Home with Assist. PT will follow pt.

## 2023-02-07 NOTE — THERAPY EVALUATION
Patient Name: Ro Nathan  : 1945    MRN: [id number]                              Today's Date: 2023       Admit Date: 2023    Visit Dx:     ICD-10-CM ICD-9-CM   1. Small bowel obstruction (HCC)  K56.609 560.9   2. Generalized abdominal pain  R10.84 789.07   3. Nausea and vomiting, unspecified vomiting type  R11.2 787.01     Patient Active Problem List   Diagnosis   • Skin ulcer (HCC)   • Chronic low back pain   • DDD (degenerative disc disease), lumbar   • Spondylosis of lumbosacral region   • Atrial fibrillation (HCC)   • Chronic obstructive pulmonary disease (HCC)   • Atherosclerosis of native coronary artery of native heart with angina pectoris (HCC)   • Gastroesophageal reflux disease   • Lumbar radiculopathy   • Other hammer toe(s) (acquired), right foot   • Presence of cardiac pacemaker   • Status post coronary artery stent placement   • COPD with exacerbation (HCC)   • Stented coronary artery   • Small bowel obstruction due to adhesions (HCC)   • Elevated lipoprotein(a)   • Generalized abdominal pain   • Diabetic foot ulcer (HCC)   • Ventral hernia with obstruction but no gangrene   • DM type 2 with diabetic dyslipidemia (HCC)   • Peripheral vascular disease due to secondary diabetes (HCC)   • Benign hypertension with CKD (chronic kidney disease) stage III (HCC)   • Stage 3a chronic kidney disease (HCC)   • Type 2 diabetes, controlled, with neuropathy (HCC)   • Chronic bronchitis with COPD (chronic obstructive pulmonary disease) (HCC)   • 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)   • Chronic respiratory failure with hypoxia (HCC)   • Diabetic ulcer of right midfoot associated with type 2 diabetes mellitus (HCC)   • Dyspnea on exertion   • 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)   • Ischemic ulcer of foot with fat layer exposed, right (HCC)   • Right foot ulcer (HCC)   • Arthritis   • CHF due to valvular disease (HCC)   • Hiatal hernia   • Presence of coronary  angioplasty implant and graft   • CAD (coronary artery disease)   • Chronic obstructive bronchitis (HCC)   • Degeneration of lumbar intervertebral disc   • Ischemic ulcer of foot (HCC)   • Diabetes mellitus with coincident hypertension (HCC)   • Benign hypertension with chronic kidney disease   • Acquired hallux malleus   • Peripheral vascular disease due to secondary diabetes mellitus (HCC)   • Obstruction of small intestine due to peritoneal adhesion (HCC)   • Lumbosacral spondylosis   • Ventral hernia without obstruction or gangrene   • Foot ulceration, right, with necrosis of bone (HCC)   • Ulcer of right foot with bone involvement without evidence of necrosis (HCC)   • Diarrhea   • Abdominal bloating   • Acute abdominal pain   • AV block, complete (HCC)   • Sepsis, unspecified   • Cellulitis of abdominal wall   • Angina of effort (HCC)   • Chronic renal insufficiency   • Long term current use of anticoagulants with INR goal of 2.0-3.0   • Alcoholic cirrhosis of liver without ascites (HCC)   • Pneumonia due to infectious organism, unspecified laterality, unspecified part of lung   • Non-healing surgical wound   • Long term (current) use of inhaled steroids   • Personal history of nicotine dependence   • Chronic systolic heart failure (CMS/HCC)   • Dyspnea, unspecified type   • Pneumonia of right lung due to infectious organism, unspecified part of lung   • (HFpEF) heart failure with preserved ejection fraction (HCC)   • Chronic low back pain   • Steroid-induced hyperglycemia   • Positive blood culture   • Acute systolic heart failure (CMS/HCC)   • Unstable angina (HCC)   • Upper GI bleed   • Acute on chronic systolic heart failure (CMS/HCC)   • Dependence on supplemental oxygen   • SBO (small bowel obstruction) (HCC)   • Wound infection   • Elbow pain, left   • Olecranon bursitis of left elbow   • Hyperkalemia   • Anemia   • Panlobular emphysema (HCC)   • Athscl heart disease of native cor art w unsp ang pctrs  (HCC)   • Paroxysmal atrial fibrillation (HCC)   • Acute blood loss anemia   • 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   • SBO (small bowel obstruction) (HCC)   • Pneumoperitoneum   • Small bowel obstruction (HCC)   • Alcohol abuse, uncomplicated   • Alcoholic polyneuropathy (HCC)     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) 10/26/2020   • Benign hypertension with CKD (chronic kidney disease) stage III (HCC) 10/26/2020   • Bruises easily    • CHF (congestive heart failure) (HCC)    • Chronic bronchitis with COPD (chronic obstructive pulmonary disease) (HCC) 10/26/2020   • Chronic respiratory failure with hypoxia (HCC) 10/26/2020   • Congenital heart defect    • Difficulty attaining erection    • Heart block    • Hernia of abdominal wall    • Hypertension    • Low back pain    • Pacemaker    • Peripheral vascular disease due to secondary diabetes (HCC) 10/26/2020   • Poor circulation    • Prostate cancer (HCC)     prostate   • Shortness of breath    • Sleep apnea     using CPAP    • Stage 3a chronic kidney disease (HCC) 10/26/2020   • Stented coronary artery 12/05/2019    MICHAEL to LAD   • Type 2 diabetes, controlled, with neuropathy (HCC) 10/26/2020    no meds   • Type 2 diabetes, controlled, with neuropathy ([facility]) 10/26/2020     Past Surgical History:   Procedure Laterality Date   • ABDOMINAL SURGERY     • APPENDECTOMY     • BONE CURETTAGE Right 1/22/2021    Procedure: Wound excision with fifth metatarsal head resection, right foot.;  Surgeon: Josef Egan DPM;  Location: [facility] OR;  Service: Podiatry;  Laterality: Right;   • BONE INCISION AND DRAINAGE Right 10/5/2020    Procedure: Incision and drainage with debridement of all nonviable soft tissue and bone with bone biopsy, right foot.;  Surgeon: Josef Egan DPM;  Location: [facility] OR;  Service: Podiatry;  Laterality: Right;   • BRONCHOSCOPY N/A 4/23/2022    Procedure: BRONCHOSCOPY  with bronchioalveolar lavage of right lower lobe;  Surgeon: Edwin Kline MD;  Location: [facility] ENDOSCOPY;  Service: Pulmonary;  Laterality: N/A;  pneumonia   • CARDIAC CATHETERIZATION N/A 12/5/2019    Procedure: Left Heart Cath;  Surgeon: Mirza Musnon MD;  Location: [facility] CATH INVASIVE LOCATION;  Service: Cardiology   • CARDIAC CATHETERIZATION N/A 12/5/2019    Procedure: Stent MICHAEL coronary;  Surgeon: Mirza Munson MD;  Location: [facility] CATH INVASIVE LOCATION;  Service: Cardiology   • CARDIAC CATHETERIZATION N/A 10/18/2021    Procedure: Left Heart Cath and coronary angiogram;  Surgeon: Celine Swanson MD;  Location: [facility] CATH INVASIVE LOCATION;  Service: Cardiovascular;  Laterality: N/A;   • CARDIAC CATHETERIZATION N/A 10/18/2021    Procedure: Right Heart Cath;  Surgeon: Celine Swanson MD;  Location: [facility] CATH INVASIVE LOCATION;  Service: Cardiovascular;  Laterality: N/A;   • CHOLECYSTECTOMY     • ELBOW PROCEDURE      left   • ENDOSCOPY N/A 7/6/2022    Procedure: ESOPHAGOGASTRODUODENOSCOPY with biopsy x 1 area. bicap cautery, scelero therapy.;  Surgeon: Douglas Delgadillo MD;  Location: [facility] ENDOSCOPY;  Service: Gastroenterology;  Laterality: N/A;  ESOPHAGITIS, ANTREL ULCER, DUODENAL ULCER   • ENDOSCOPY N/A 11/27/2022    Procedure: ESOPHAGOGASTRODUODENOSCOPY with argon plasma coagulation of small bowel arteriovenous malformation;  Surgeon: Douglas Delgadillo MD;  Location: [facility] ENDOSCOPY;  Service: Gastroenterology;  Laterality: N/A;  post: small bowel AVM   • FOOT SURGERY      right foot   • HARDWARE REMOVAL Left 10/18/2022    Procedure: ELBOW HARDWARE REMOVAL;  Surgeon: Brad Jackson MD;  Location: [facility] MAIN OR;  Service: Orthopedics;  Laterality: Left;   • HERNIA REPAIR     • INCISION AND DRAINAGE OF WOUND Right 4/6/2021    Procedure: INCISION AND DRAINAGE OF RIGHT FOOT 5TH METATARSAL TO BONE AND PARTIAL 5TH METATARSAL RESECTION;  Surgeon: Josef Egan DPM;   Location: [facility] MAIN OR;  Service: Podiatry;  Laterality: Right;   • INCISION AND DRAINAGE OF WOUND Right 4/8/2021    Procedure: INCISION AND DRAINAGE BONE, DELAYED PRIMARY CLOSURE;  Surgeon: Josef Egan DPM;  Location: [facility] MAIN OR;  Service: Podiatry;  Laterality: Right;   • INTERVENTIONAL RADIOLOGY PROCEDURE N/A 12/5/2019    Procedure: Intravascular Ultrasound;  Surgeon: Mirza Munson MD;  Location: [facility] CATH INVASIVE LOCATION;  Service: Cardiology   • PACEMAKER IMPLANTATION     • CO RT/LT HEART CATHETERS N/A 12/5/2019    Procedure: Percutaneous Coronary Intervention;  Surgeon: Mirza Munson MD;  Location: [facility] CATH INVASIVE LOCATION;  Service: Cardiology   • WOUND DEBRIDEMENT Right 10/29/2020    Procedure: Preparation and debridement of foot wound with application of Integra wound graft, right foot.;  Surgeon: Josef Egan DPM;  Location: [facility] MAIN OR;  Service: Podiatry;  Laterality: Right;   • WOUND DEBRIDEMENT N/A 7/29/2021    Procedure: EXCISIONAL ABDOMINAL WOUND  DEBRIDEMENT;  Surgeon: Cleopatra Wang MD;  Location: [facility] MAIN OR;  Service: General;  Laterality: N/A;   • WOUND DEBRIDEMENT N/A 8/1/2021    Procedure: DEBRIDEMENT OF ABDOMINAL WALL;  Surgeon: Hill Bhatia DO;  Location: [facility] MAIN OR;  Service: General;  Laterality: N/A;      General Information     Row Name 02/07/23 1546          Physical Therapy Time and Intention    Document Type evaluation  -BR     Mode of Treatment physical therapy  -BR     Row Name 02/07/23 1546          General Information    Patient Profile Reviewed yes  -BR     Prior Level of Function independent:;all household mobility;gait;transfer  Pt used a rolling walker.  -BR     Existing Precautions/Restrictions oxygen therapy device and L/min  -BR     Barriers to Rehab medically complex  -BR     Row Name 02/07/23 1546          Living Environment    People in Home spouse  -BR     Row Name 02/07/23 1546          Home Main  "Entrance    Number of Stairs, Main Entrance two  -BR     Stair Railings, Main Entrance none  -BR     Row Name 02/07/23 1546          Stairs Within Home, Primary    Number of Stairs, Within Home, Primary none  -BR     Row Name 02/07/23 1546          Cognition    Orientation Status (Cognition) oriented x 4  -BR     Row Name 02/07/23 1546          Safety Issues, Functional Mobility    Impairments Affecting Function (Mobility) endurance/activity tolerance;strength;pain;shortness of breath  -BR           User Key  (r) = Recorded By, (t) = Taken By, (c) = Cosigned By    Initials Name Provider Type    BR Genevieve Thompson, PT Physical Therapist               Mobility     Row Name 02/07/23 1547          Bed Mobility    Bed Mobility bed mobility (all) activities  -BR     All Activities, [place] (Bed Mobility) contact guard  -BR     Assistive Device (Bed Mobility) head of bed elevated;draw sheet  -BR     Row Name 02/07/23 1547          Transfers    Comment, (Transfers) Pt declined a transfer to standing secondary to \"having a rough day.\"  -BR           User Key  (r) = Recorded By, (t) = Taken By, (c) = Cosigned By    Initials Name Provider Type    Genevieve Hayes, PT Physical Therapist               Obj/Interventions     Row Name 02/07/23 1548          Range of Motion Comprehensive    General Range of Motion bilateral lower extremity ROM WFL  -BR     Comment, General Range of Motion Bilateral lower legs are ace wrapped due to weeping.  -BR     Row Name 02/07/23 1548          Strength Comprehensive (MMT)    Comment, General Manual Muscle Testing (MMT) Assessment grossly 3+/5 BLE  -BR     Row Name 02/07/23 1548          Balance    Balance Assessment sitting static balance  -BR     Static Sitting Balance contact guard  -BR     Position, Sitting Balance unsupported;sitting edge of bed  -BR     Row Name 02/07/23 1548          Sensory Assessment (Somatosensory)    Sensory Assessment (Somatosensory) bilateral LE  -BR     " Bilateral LE Sensory Assessment light touch awareness;impaired  -BR           User Key  (r) = Recorded By, (t) = Taken By, (c) = Cosigned By    Initials Name Provider Type    Genevieve Hayes PT Physical Therapist               Goals/Plan     Row Name 02/07/23 1555          Bed Mobility Goal 1 (PT)    Activity/Assistive Device (Bed Mobility Goal 1, PT) bed mobility activities, all  -BR     [place] Level/Cues Needed (Bed Mobility Goal 1, PT) modified independence  -BR     Time Frame (Bed Mobility Goal 1, PT) long term goal (LTG);2 weeks  -BR     Row Name 02/07/23 1555          Transfer Goal 1 (PT)    Activity/Assistive Device (Transfer Goal 1, PT) transfers, all  -BR     [place] Level/Cues Needed (Transfer Goal 1, PT) standby assist  -BR     Time Frame (Transfer Goal 1, PT) long term goal (LTG);2 weeks  -BR     Row Name 02/07/23 1555          Gait Training Goal 1 (PT)    Activity/Assistive Device (Gait Training Goal 1, PT) gait (walking locomotion);walker, rolling  -BR     [place] Level (Gait Training Goal 1, PT) supervision required  -BR     Distance (Gait Training Goal 1, PT) 35  -BR     Time Frame (Gait Training Goal 1, PT) long term goal (LTG);2 weeks  -BR     Row Name 02/07/23 1585          Therapy Assessment/Plan (PT)    Planned Therapy Interventions (PT) balance training;bed mobility training;gait training;neuromuscular re-education;strengthening;transfer training  -BR           User Key  (r) = Recorded By, (t) = Taken By, (c) = Cosigned By    Initials Name Provider Type    Genevieve Hayes PT Physical Therapist               Clinical Impression     Row Name 02/07/23 1549          Pain    Pretreatment Pain Rating 8/10  -BR     Posttreatment Pain Rating 8/10  -BR     Pain Location - abdomen;back  -BR     Row Name 02/07/23 1555 02/07/23 1549       Plan of Care Review    Plan of Care Reviewed With -- patient  -BR    Outcome Evaluation Pt presents as a 78 y/o M admitted to [facility] on 2/7/23  with abdominal pain and vomiting. 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 Pt requiring O2 at 5 L and c/o 8/10 abdominal and back pain. Pt A and O x 4. At baseline, pt lives with spouse and uses a rolling walker for household mobility. Today, pt requiring CA of 1 for bed mobility; he declines a transfer to standing. PT recommendation at this time is Home Health Physical Therapy and Home with Assist. PT will follow pt.  -BR --    Row Name 02/07/23 1549          Therapy Assessment/Plan (PT)    Rehab Potential (PT) good, to achieve stated therapy goals  -BR     Criteria for Skilled Interventions Met (PT) yes;meets criteria;skilled treatment is necessary  -BR     Therapy Frequency (PT) 3 times/wk  -BR     Predicted Duration of Therapy Intervention (PT) until D/C  -BR     Row Name 02/07/23 1549          Vital Signs    Pre Systolic BP Rehab 123  -BR     Pre Treatment Diastolic BP 64  -BR     Pretreatment Heart Rate (beats/min) 75  -BR     Pretreatment Resp Rate (breaths/min) 23  -BR     Pre SpO2 (%) 95  -BR     O2 Delivery Pre Treatment nasal cannula  5 L  -BR     Row Name 02/07/23 1549          Positioning and Restraints    Pre-Treatment Position in bed  -BR     Post Treatment Position bed  -BR     In Bed notified nsg;fowlers;encouraged to call for assist;call light within reach;exit alarm on;side rails up x2  -BR           User Key  (r) = Recorded By, (t) = Taken By, (c) = Cosigned By    Initials Name Provider Type    BR Genevieve Thompson, PT Physical Therapist               Outcome Measures     Row Name 02/07/23 2225          How much help from another person do you currently need...    Turning from your back to your side while in flat bed  without using bedrails? 4  -BR     Moving from lying on back to sitting on the side of a flat bed without bedrails? 3  -BR     Moving to and from a bed to a chair (including a wheelchair)? 3  -BR     Standing up from a chair using your arms (e.g., wheelchair, bedside chair)? 3  -BR     Climbing 3-5 steps with a railing? 2  -BR     To walk in hospital room? 2  -BR     AM-PAC 6 Clicks Score (PT) 17  -BR     Highest level of mobility 5 --> Static standing  -BR     Row Name 02/07/23 1556          Functional Assessment    Outcome Measure Options AM-PAC 6 Clicks Basic Mobility (PT)  -BR           User Key  (r) = Recorded By, (t) = Taken By, (c) = Cosigned By    Initials Name Provider Type    BR Genevieve Thompson PT Physical Therapist                             Physical Therapy Education     Title: PT OT SLP Therapies (In Progress)     Topic: Physical Therapy (In Progress)     Point: Mobility training (Done)     Learning Progress Summary           Patient Acceptance, E,D, VU,DU by BR at 2/7/2023 0207                   Point: Home exercise program (Not Started)     Learner Progress:  Not documented in this visit.          Point: Body mechanics (Not Started)     Learner Progress:  Not documented in this visit.          Point: Precautions (Not Started)     Learner Progress:  Not documented in this visit.                      User Key     Initials Effective Dates Name Provider Type Discipline    JARAD 02/01/22 -  Genevieve Thompson PT Physical Therapist PT              PT Recommendation and Plan  Planned Therapy Interventions (PT): balance training, bed mobility training, gait training, neuromuscular re-education, strengthening, transfer training  Plan of Care Reviewed With: patient  Outcome Evaluation: Pt presents as a 78 y/o M admitted to [facility] on 2/7/23 with abdominal pain and vomiting. 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 Pt requiring O2 at 5 L and c/o 8/10 abdominal and back pain. Pt A and O x 4. At baseline, pt lives with spouse and uses a rolling walker for household mobility. Today, pt requiring CA of 1 for bed mobility; he declines a transfer to standing. PT recommendation at this time is Home Health Physical Therapy and Home with Assist. PT will follow pt.     Time Calculation:    PT Charges     Row Name 02/07/23 1557             Time Calculation    Start Time 1400  -BR      Stop Time 1425  -BR      Time Calculation (min) 25 min  -BR      PT Received On 02/07/23  -BR      PT - Next Appointment 02/09/23  -BR      PT Goal Re-Cert Due Date 02/21/23  -BR         Time Calculation- PT    Total Timed Code Minutes- PT 0 minute(s)  -BR            User Key  (r) = Recorded By, (t) = Taken By, (c) = Cosigned By    Initials Name Provider Type    BR Genevieve Thompson PT Physical Therapist              Therapy Charges for Today     Code Description Service Date Service Provider Modifiers Qty    93821414766 HC PT EVAL MOD COMPLEXITY 4 2/7/2023 Genevieve Thompson, PT GP 1          PT G-Codes  Outcome Measure Options: AM-PAC 6 Clicks Basic Mobility (PT)  AM-PAC 6 Clicks Score (PT): 17  PT Discharge Summary  Anticipated Discharge Disposition (PT): home with home health, home with assist    Genevieve Thompson, PRAKASH  2/7/2023

## 2023-02-08 PROBLEM — L98.429 STAGE 2 SKIN ULCER OF SACRAL REGION: Status: ACTIVE | Noted: 2023-01-01

## 2023-02-08 NOTE — CASE MANAGEMENT/SOCIAL WORK
Continued Stay Note  [facility]     Patient Name: Ro Nathan  MRN: [id number]  Today's Date: 2/8/2023    Admit Date: 2/6/2023    Plan: Home with spouse and sister in law and resume VNA. Will need Home Health orders. Spouse to transport   Discharge Plan     Row Name 02/08/23 1512       Plan    Plan Home with spouse and sister in law and resume VNA. Will need Home Health orders. Spouse to transport    Plan Comments Barriers: Surgery consult for SBO. On clear liquids. CM met with Mr. Nathan and verified he lives with his wife, Meghan and her sister and will return home at discharge and resume VNA. His spouse will provide transportation. He stated he has home oxygen and nebulizer through [employer]. CM will continue to follow.         Met with patient in room wearing PPE: mask, face shield/goggles, gloves, gown.      Maintained distance greater than six feet and spent less than 15 minutes in the room.          Lucie CHEEK,RN Case Manager  [facility]  Phone: Desk- [phone] cell- [phone]

## 2023-02-08 NOTE — PLAN OF CARE
Goal Outcome Evaluation:      Pt up on and off throughout the night, c/o abdomen and back pain Dilaudid given as needed. Pt tolerating clear liquid diet at this time. Will continue to monitor.

## 2023-02-08 NOTE — PROGRESS NOTES
General Surgery Progress Note    Name: Ro Nathan ADMIT: 2023   : 1945  PCP: Yusuf Jones MD    MRN: [id number] LOS: 2 days   AGE/SEX: 77 y.o. male  ROOM: [address] [place] [address]    Chief Complaint   Patient presents with   • Abdominal Pain     Subjective     77 y.o. male admitted with recurrent acute on chronic bowel obstruction    No major events overnight.  No nausea or vomiting.  Plenty of flatus.  No bowel movement yet.  Starting to feel hungry.    Objective     Scheduled Medications:   atorvastatin, 40 mg, Oral, QAM  docusate sodium, 200 mg, Oral, BID  enoxaparin, 120 mg, Subcutaneous, Q12H  escitalopram, 10 mg, Oral, Daily  famotidine, 20 mg, Intravenous, Daily  glipizide, 5 mg, Oral, QAM AC  insulin lispro, 2-9 Units, Subcutaneous, TID With Meals  isosorbide mononitrate, 120 mg, Oral, Q24H  metoprolol tartrate, 50 mg, Oral, BID With Meals  senna-docusate sodium, 2 tablet, Oral, BID   And  polyethylene glycol, 17 g, Oral, Daily  pregabalin, 150 mg, Oral, BID  QUEtiapine, 50 mg, Oral, Nightly  ranolazine, 1,000 mg, Oral, Q12H  rOPINIRole, 1 mg, Oral, Daily  sodium chloride, 3 mL, Intravenous, Q12H        Active Infusions:  Pharmacy to Dose enoxaparin (LOVENOX),   sodium chloride, 50 mL/hr, Last Rate: Stopped (23 1604)        As Needed Medications:  •  acetaminophen  •  senna-docusate sodium **AND** polyethylene glycol **AND** bisacodyl **AND** bisacodyl  •  dextrose  •  dextrose  •  glucagon (human recombinant)  •  hydrALAZINE  •  HYDROmorphone  •  ipratropium-albuterol  •  metoprolol tartrate  •  ondansetron **OR** ondansetron  •  oxyCODONE  •  Pharmacy to Dose enoxaparin (LOVENOX)  •  promethazine  •  [COMPLETED] Insert Peripheral IV **AND** sodium chloride  •  sodium chloride  •  sodium chloride    Vital Signs  Vital Signs   Patient Vitals for the past 24 hrs:   BP Temp Temp src Pulse Resp SpO2   2337 -- -- -- 73 16 100 %   2334 -- -- -- -- 14 --   23  113/68 -- -- 77 12 98 %   02/08/23 0347 -- -- -- 76 14 98 %   02/08/23 0344 -- -- -- 71 14 98 %   02/08/23 0320 130/59 97.4 °F (36.3 °C) Oral 77 15 97 %   02/07/23 2354 115/62 97.3 °F (36.3 °C) Oral 77 19 99 %   02/07/23 2047 106/51 -- -- 73 -- --   02/07/23 1952 98/75 97.5 °F (36.4 °C) Oral 75 20 93 %   02/07/23 1657 111/48 97.5 °F (36.4 °C) Temporal 74 16 95 %   02/07/23 1440 123/64 -- -- 72 -- --   02/07/23 1133 107/65 -- -- 78 -- --   02/07/23 1127 -- 98.1 °F (36.7 °C) Oral 70 15 93 %       Physical Exam:  Physical Exam  Constitutional:       Appearance: Normal appearance.   HENT:      Head: Normocephalic and atraumatic.   Cardiovascular:      Rate and Rhythm: Normal rate.   Pulmonary:      Effort: Pulmonary effort is normal. No respiratory distress.   Abdominal:      Palpations: Abdomen is soft.      Comments: Minimal tenderness to palpation today   Skin:     General: Skin is warm and dry.   Neurological:      General: No focal deficit present.      Mental Status: He is alert. Mental status is at baseline.   Psychiatric:         Mood and Affect: Mood normal.         Behavior: Behavior normal.         Results Review:     CBC    Results from last 7 days   Lab Units 02/08/23  0857 02/07/23  0700 02/06/23  0750   WBC 10*3/mm3 7.00 10.20 13.60*   HEMOGLOBIN g/dL 10.0* 11.3* 12.0*   PLATELETS 10*3/mm3 390 544* 543*     BMP   Results from last 7 days   Lab Units 02/08/23  0526 02/07/23  0700 02/06/23  0750   SODIUM mmol/L 140 144 136   POTASSIUM mmol/L 4.3 3.9 4.6   CHLORIDE mmol/L 97* 95* 95*   CO2 mmol/L 34.0* 38.0* 26.0   BUN mg/dL 54* 41* 34*   CREATININE mg/dL 2.48* 1.90* 1.96*   GLUCOSE mg/dL 116* 201* 235*   MAGNESIUM mg/dL  --   --  2.3     Radiology(recent) No radiology results for the last day    I reviewed the patient's new clinical results.  I reviewed the patient's new imaging results and agree with the interpretation.    Assessment & Plan       Small bowel obstruction (HCC)      77 y.o. male admitted  with multiple recurrences of acute on chronic bowel obstruction secondary to large ventral hernia with complete loss of abdominal domain    Tolerating the clear liquid diet  Start some MiraLAX  Full liquid diet with boost        This note was created using Dragon Voice Recognition software.    Connor Galindo MD  02/08/23  10:51 EST

## 2023-02-08 NOTE — PROGRESS NOTES
LOS: 2 days   Patient Care Team:  Yusuf Jones MD as PCP - Joshua Evangelista MD as Consulting Physician (Cardiology)  Moise Watson MD as Consulting Physician (Cardiac Electrophysiology)  Izzy Alvarez MD as Consulting Physician (Nephrology)  Celine Swanson MD as Consulting Physician (Cardiology)    Subjective:  Improved--less abdominal pain    Objective:   NGT spontaneously      Review of Systems:   Review of Systems   Constitutional: Positive for activity change.   HENT: Negative.    Respiratory: Negative.    Cardiovascular: Negative.    Gastrointestinal: Positive for abdominal distention, abdominal pain and nausea.           Vital Signs  Temp:  [97.3 °F (36.3 °C)-97.8 °F (36.6 °C)] 97.4 °F (36.3 °C)  Heart Rate:  [71-77] 71  Resp:  [12-20] 16  BP: ()/(41-75) 113/53    Physical Exam:  Physical Exam  Vitals and nursing note reviewed.   Cardiovascular:      Rate and Rhythm: Normal rate.      Heart sounds: Normal heart sounds.   Pulmonary:      Breath sounds: Normal breath sounds.   Abdominal:      General: There is distension.      Palpations: Abdomen is soft.      Tenderness: There is abdominal tenderness.   Skin:     General: Skin is warm.          Radiology:  CT Abdomen Pelvis Without Contrast    Result Date: 2/6/2023  Impression: 1. Findings consistent with the appearance of high-grade small bowel obstruction. 2. There is laxity of the anterior abdominal wall, with transition zone of decompressed small bowel thought to be within right lower quadrant pannus formation. There are 3. 3.5 cm fusiform infrarenal abdominal aortic aneurysm. 4. Partial staghorn calculus in the left kidney. No ureteral stone. No hydronephrosis. 5. Dense patchy posterior bilateral lower lobe airspace disease may represent pneumonia or sequelae of aspiration. Electronically Signed: Ekaterina Goins  2/6/2023 10:04 AM EST  Workstation ID: [id number]         Results Review:     I reviewed the  patient's new clinical results.  I reviewed the patient's new imaging results and agree with the interpretation.    Medication Review:   Scheduled Meds:atorvastatin, 40 mg, Oral, QAM  docusate sodium, 200 mg, Oral, BID  enoxaparin, 120 mg, Subcutaneous, Q12H  escitalopram, 10 mg, Oral, Daily  famotidine, 20 mg, Intravenous, Daily  glipizide, 5 mg, Oral, QAM AC  insulin lispro, 2-9 Units, Subcutaneous, TID With Meals  isosorbide mononitrate, 120 mg, Oral, Q24H  metoprolol tartrate, 50 mg, Oral, BID With Meals  senna-docusate sodium, 2 tablet, Oral, BID   And  polyethylene glycol, 17 g, Oral, Daily  pregabalin, 150 mg, Oral, BID  QUEtiapine, 50 mg, Oral, Nightly  ranolazine, 1,000 mg, Oral, Q12H  rOPINIRole, 1 mg, Oral, Daily  sodium chloride, 3 mL, Intravenous, Q12H      Continuous Infusions:Pharmacy to Dose enoxaparin (LOVENOX),   sodium chloride, 50 mL/hr, Last Rate: 50 mL/hr (02/08/23 1620)      PRN Meds:.•  acetaminophen  •  senna-docusate sodium **AND** polyethylene glycol **AND** bisacodyl **AND** bisacodyl  •  dextrose  •  dextrose  •  glucagon (human recombinant)  •  hydrALAZINE  •  HYDROmorphone  •  ipratropium-albuterol  •  metoprolol tartrate  •  ondansetron **OR** ondansetron  •  oxyCODONE  •  Pharmacy to Dose enoxaparin (LOVENOX)  •  promethazine  •  [COMPLETED] Insert Peripheral IV **AND** sodium chloride  •  sodium chloride  •  sodium chloride    Labs:    CBC    Results from last 7 days   Lab Units 02/08/23  0857 02/07/23  0700 02/06/23  0750   WBC 10*3/mm3 7.00 10.20 13.60*   HEMOGLOBIN g/dL 10.0* 11.3* 12.0*   PLATELETS 10*3/mm3 390 544* 543*     BMP   Results from last 7 days   Lab Units 02/08/23  0526 02/07/23  0700 02/06/23  0750   SODIUM mmol/L 140 144 136   POTASSIUM mmol/L 4.3 3.9 4.6   CHLORIDE mmol/L 97* 95* 95*   CO2 mmol/L 34.0* 38.0* 26.0   BUN mg/dL 54* 41* 34*   CREATININE mg/dL 2.48* 1.90* 1.96*   GLUCOSE mg/dL 116* 201* 235*   MAGNESIUM mg/dL  --   --  2.3     Cr Clearance Estimated  Creatinine Clearance: 34.2 mL/min (A) (by C-G formula based on SCr of 2.48 mg/dL (H)).  Coag     HbA1C   Lab Results   Component Value Date    HGBA1C 7.3 (H) 10/12/2022    HGBA1C 8.5 (H) 06/02/2022    HGBA1C 8.5 (H) 04/19/2022     Blood Glucose   Glucose   Date/Time Value Ref Range Status   02/08/2023 1724 118 (H) 70 - 105 mg/dL Final     Comment:     Serial Number: [id number]:  [id number]   02/08/2023 1238 77 70 - 105 mg/dL Final     Comment:     Serial Number: [id number]:  [id number]   02/08/2023 1215 78 70 - 105 mg/dL Final     Comment:     Serial Number: [id number]:  [id number]   02/08/2023 0707 97 70 - 105 mg/dL Final     Comment:     Serial Number: [id number]:  [id number]   02/07/2023 1722 122 (H) 70 - 105 mg/dL Final     Comment:     Serial Number: [id number]:  [id number]   02/07/2023 1600 119 (H) 70 - 105 mg/dL Final     Comment:     Serial Number: [id number]:  [id number]   02/07/2023 1113 167 (H) 70 - 105 mg/dL Final     Comment:     Serial Number: [id number]:  [id number]   02/07/2023 0750 186 (H) 70 - 105 mg/dL Final     Comment:     Serial Number: [id number]:  [id number]     Infection   Results from last 7 days   Lab Units 02/06/23  1151 02/06/23  1048 02/06/23  0902   BLOODCX  No growth at 2 days No growth at 2 days  --    URINECX   --   --  No growth     CMP   Results from last 7 days   Lab Units 02/08/23  0526 02/07/23  0700 02/06/23  0750   SODIUM mmol/L 140 144 136   POTASSIUM mmol/L 4.3 3.9 4.6   CHLORIDE mmol/L 97* 95* 95*   CO2 mmol/L 34.0* 38.0* 26.0   BUN mg/dL 54* 41* 34*   CREATININE mg/dL 2.48* 1.90* 1.96*   GLUCOSE mg/dL 116* 201* 235*   ALBUMIN g/dL  --   --  3.8   BILIRUBIN mg/dL  --   --  0.6   ALK PHOS U/L  --   --  153*   AST (SGOT) U/L  --   --  15   ALT (SGPT) U/L  --   --  5   LIPASE U/L  --   --  18     UA    Results from last 7 days   Lab Units 02/06/23  0902   NITRITE UA  Negative   WBC UA /HPF 0-2*   BACTERIA UA /HPF Trace*   SQUAM  EPITHEL UA /HPF 0-2   URINECX  No growth     Radiology(recent) No radiology results for the last day   Assessment:    Recurrent high-grade small bowel obstruction  MOSD  Inoperable massive ventral herniation  Peptic ulcer disease  History of erosive gastritis  History of grade B esophagitis  Gastroesophageal reflux disease with esophagitis  Herbert's esophageal change  DM II with renal manifestations  DMII with angiopathic manifestations  DMII with neuropathic manifestations  Diabetic dyslipidemia  Panlobular COPD with emphysema  Nicotine dependency with nicotine use disorder, cigarettes  DDD L/S  CKD IIIa  HTN associated with CKD IIIa  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  Cirrhosis  Small vessel PAD secondary to diabetes  Diabetic lower extremity ulcerations  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, MOSD  History of right renal mass  3.4 cm infrarenal abdominal aortic aneurysm  Valvular heart disease with significant mitral regurgitation  Paroxysmal atrial fibrillation  Hypercoagulable state secondary to atrial fibrillation  Chronic oral anticoagulation therapy with direct thrombin inhibition  ACD  Chronic systolic congestive heart failure  Chronic hypoxic respiratory failure  Supplemental oxygen dependency  Chronic mucopurulent bronchitis  History of prostate cancer  Morbid exogenous obesity  Hypoventilation apnea syndrome with ARTI  Peripheral polyneuropathy secondary to alcohol and diabetes  Autonomic neuropathy secondary to diabetes with autonomic dysfunction syndrome  Monoclonal paraproteinemia  Narcotic dependency  Vitamin D deficiency  ETOHISM  H/O pacemaker placement    Plan:  Advance diet        Yusuf Jones MD  02/08/23  18:55 EST

## 2023-02-08 NOTE — PROGRESS NOTES
Wound Initial Evaluation  KATLYN MURRELL     Patient Name: Ro Nathan  : 1945  MRN: [id number]  Today's Date: 2023 Room Number: 229/1      Admit Date: 2023  Attending: Yusuf Jones MD    Consult Requested By: Dr Jones    Reason For Consult: sacral PI    Chief Complaint: 77-year-old male who presented to the hospital with reports of a blocked bowel.  Patient is currently being seen by surgery.  He is well-known to service from previous admissions.  Patient seen today for sacral pressure injury.  Per patient, the area began approximately 3 weeks ago.  States that he began sleeping in his recliner as it was difficult to breathe and because he is sleeping in his recliner in his bed.  He has increased pressure to the site.    Visit Dx:    ICD-10-CM ICD-9-CM   1. Small bowel obstruction (HCC)  K56.609 560.9   2. Generalized abdominal pain  R10.84 789.07   3. Nausea and vomiting, unspecified vomiting type  R11.2 787.01     Patient Active Problem List   Diagnosis   • Skin ulcer (HCC)   • Chronic low back pain   • DDD (degenerative disc disease), lumbar   • Spondylosis of lumbosacral region   • Atrial fibrillation (HCC)   • Chronic obstructive pulmonary disease (HCC)   • Atherosclerosis of native coronary artery of native heart with angina pectoris (HCC)   • Gastroesophageal reflux disease   • Lumbar radiculopathy   • Other hammer toe(s) (acquired), right foot   • Presence of cardiac pacemaker   • Status post coronary artery stent placement   • COPD with exacerbation ([facility])   • Stented coronary artery   • Small bowel obstruction due to adhesions ([facility])   • Elevated lipoprotein(a)   • Generalized abdominal pain   • Diabetic foot ulcer (HCC)   • Ventral hernia with obstruction but no gangrene   • DM type 2 with diabetic dyslipidemia ([facility])   • Peripheral vascular disease due to secondary diabetes (HCC)   • Benign hypertension with CKD (chronic kidney disease) stage III (HCC)   • Stage 3a chronic kidney  disease (HCC)   • Type 2 diabetes, controlled, with neuropathy (HCC)   • Chronic bronchitis with COPD (chronic obstructive pulmonary disease) (HCC)   • 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)   • Chronic respiratory failure with hypoxia (HCC)   • Diabetic ulcer of right midfoot associated with type 2 diabetes mellitus (HCC)   • Dyspnea on exertion   • 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)   • Ischemic ulcer of foot with fat layer exposed, right (HCC)   • Right foot ulcer (HCC)   • Arthritis   • CHF due to valvular disease (HCC)   • Hiatal hernia   • Presence of coronary angioplasty implant and graft   • CAD (coronary artery disease)   • Chronic obstructive bronchitis (HCC)   • Degeneration of lumbar intervertebral disc   • Ischemic ulcer of foot (HCC)   • Diabetes mellitus with coincident hypertension (HCC)   • Benign hypertension with chronic kidney disease   • Acquired hallux malleus   • Peripheral vascular disease due to secondary diabetes mellitus (HCC)   • Obstruction of small intestine due to peritoneal adhesion (HCC)   • Lumbosacral spondylosis   • Ventral hernia without obstruction or gangrene   • Foot ulceration, right, with necrosis of bone (HCC)   • Ulcer of right foot with bone involvement without evidence of necrosis (HCC)   • Diarrhea   • Abdominal bloating   • Acute abdominal pain   • AV block, complete (HCC)   • Sepsis, unspecified   • Cellulitis of abdominal wall   • Angina of effort (HCC)   • Chronic renal insufficiency   • Long term current use of anticoagulants with INR goal of 2.0-3.0   • Alcoholic cirrhosis of liver without ascites (HCC)   • Pneumonia due to infectious organism, unspecified laterality, unspecified part of lung   • Non-healing surgical wound   • Long term (current) use of inhaled steroids   • Personal history of nicotine dependence   • Chronic systolic heart failure (CMS/HCC)   • Dyspnea, unspecified type   • Pneumonia of right lung due to infectious  organism, unspecified part of lung   • (HFpEF) heart failure with preserved ejection fraction (HCC)   • Chronic low back pain   • Steroid-induced hyperglycemia   • Positive blood culture   • Acute systolic heart failure (CMS/HCC)   • Unstable angina (HCC)   • Upper GI bleed   • Acute on chronic systolic heart failure (CMS/HCC)   • Dependence on supplemental oxygen   • SBO (small bowel obstruction) (HCC)   • Wound infection   • Elbow pain, left   • Olecranon bursitis of left elbow   • Hyperkalemia   • Anemia   • Panlobular emphysema (HCC)   • Athscl heart disease of native cor art w unsp ang pctrs (HCC)   • Paroxysmal atrial fibrillation (HCC)   • Acute blood loss anemia   • 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   • SBO (small bowel obstruction) (HCC)   • Pneumoperitoneum   • Small bowel obstruction (HCC)   • Alcohol abuse, uncomplicated   • Alcoholic polyneuropathy (HCC)   • Stage 2 skin ulcer of sacral region (HCC)       History:   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) 10/26/2020   • Benign hypertension with CKD (chronic kidney disease) stage III (HCC) 10/26/2020   • Bruises easily    • CHF (congestive heart failure) (HCC)    • Chronic bronchitis with COPD (chronic obstructive pulmonary disease) (HCC) 10/26/2020   • Chronic respiratory failure with hypoxia (HCC) 10/26/2020   • Congenital heart defect    • Difficulty attaining erection    • Heart block    • Hernia of abdominal wall    • Hypertension    • Low back pain    • Pacemaker    • Peripheral vascular disease due to secondary diabetes (HCC) 10/26/2020   • Poor circulation    • Prostate cancer (HCC)     prostate   • Shortness of breath    • Sleep apnea     using CPAP    • Stage 3a chronic kidney disease (HCC) 10/26/2020   • Stented coronary artery 12/05/2019    MICHAEL to LAD   • Type 2 diabetes, controlled, with neuropathy (HCC) 10/26/2020    no meds   • Type 2 diabetes, controlled, with  neuropathy (HCC) 10/26/2020     Past Surgical History:   Procedure Laterality Date   • ABDOMINAL SURGERY     • APPENDECTOMY     • BONE CURETTAGE Right 1/22/2021    Procedure: Wound excision with fifth metatarsal head resection, right foot.;  Surgeon: Joesf Egan DPM;  Location: [facility] MAIN OR;  Service: Podiatry;  Laterality: Right;   • BONE INCISION AND DRAINAGE Right 10/5/2020    Procedure: Incision and drainage with debridement of all nonviable soft tissue and bone with bone biopsy, right foot.;  Surgeon: Josef Egan DPM;  Location: [facility] MAIN OR;  Service: Podiatry;  Laterality: Right;   • BRONCHOSCOPY N/A 4/23/2022    Procedure: BRONCHOSCOPY with bronchioalveolar lavage of right lower lobe;  Surgeon: Edwin Kline MD;  Location: [facility] ENDOSCOPY;  Service: Pulmonary;  Laterality: N/A;  pneumonia   • CARDIAC CATHETERIZATION N/A 12/5/2019    Procedure: Left Heart Cath;  Surgeon: Mirza Munson MD;  Location: [facility] CATH INVASIVE LOCATION;  Service: Cardiology   • CARDIAC CATHETERIZATION N/A 12/5/2019    Procedure: Stent MICHAEL coronary;  Surgeon: Mirza Munson MD;  Location: [facility] CATH INVASIVE LOCATION;  Service: Cardiology   • CARDIAC CATHETERIZATION N/A 10/18/2021    Procedure: Left Heart Cath and coronary angiogram;  Surgeon: Celine Swanson MD;  Location: [facility] CATH INVASIVE LOCATION;  Service: Cardiovascular;  Laterality: N/A;   • CARDIAC CATHETERIZATION N/A 10/18/2021    Procedure: Right Heart Cath;  Surgeon: Celine Swanson MD;  Location: [facility] CATH INVASIVE LOCATION;  Service: Cardiovascular;  Laterality: N/A;   • CHOLECYSTECTOMY     • ELBOW PROCEDURE      left   • ENDOSCOPY N/A 7/6/2022    Procedure: ESOPHAGOGASTRODUODENOSCOPY with biopsy x 1 area. bicap cautery, scelero therapy.;  Surgeon: Douglas Delgadillo MD;  Location: [facility] ENDOSCOPY;  Service: Gastroenterology;  Laterality: N/A;  ESOPHAGITIS, ANTREL ULCER, DUODENAL ULCER   • ENDOSCOPY N/A 11/27/2022     Procedure: ESOPHAGOGASTRODUODENOSCOPY with argon plasma coagulation of small bowel arteriovenous malformation;  Surgeon: Douglas Delgadillo MD;  Location: [facility] ENDOSCOPY;  Service: Gastroenterology;  Laterality: N/A;  post: small bowel AVM   • FOOT SURGERY      right foot   • HARDWARE REMOVAL Left 10/18/2022    Procedure: ELBOW HARDWARE REMOVAL;  Surgeon: Brad Jackson MD;  Location: [facility] MAIN OR;  Service: Orthopedics;  Laterality: Left;   • HERNIA REPAIR     • INCISION AND DRAINAGE OF WOUND Right 4/6/2021    Procedure: INCISION AND DRAINAGE OF RIGHT FOOT 5TH METATARSAL TO BONE AND PARTIAL 5TH METATARSAL RESECTION;  Surgeon: Josef Egan DPM;  Location: [facility] MAIN OR;  Service: Podiatry;  Laterality: Right;   • INCISION AND DRAINAGE OF WOUND Right 4/8/2021    Procedure: INCISION AND DRAINAGE BONE, DELAYED PRIMARY CLOSURE;  Surgeon: Josef Egan DPM;  Location: [facility] MAIN OR;  Service: Podiatry;  Laterality: Right;   • INTERVENTIONAL RADIOLOGY PROCEDURE N/A 12/5/2019    Procedure: Intravascular Ultrasound;  Surgeon: Mirza Munson MD;  Location: [facility] CATH INVASIVE LOCATION;  Service: Cardiology   • PACEMAKER IMPLANTATION     • HI RT/LT HEART CATHETERS N/A 12/5/2019    Procedure: Percutaneous Coronary Intervention;  Surgeon: Mirza Munson MD;  Location: [facility] CATH INVASIVE LOCATION;  Service: Cardiology   • WOUND DEBRIDEMENT Right 10/29/2020    Procedure: Preparation and debridement of foot wound with application of Integra wound graft, right foot.;  Surgeon: Josef Egan DPM;  Location: [facility] MAIN OR;  Service: Podiatry;  Laterality: Right;   • WOUND DEBRIDEMENT N/A 7/29/2021    Procedure: EXCISIONAL ABDOMINAL WOUND  DEBRIDEMENT;  Surgeon: Cleopatra Wang MD;  Location: [facility] MAIN OR;  Service: General;  Laterality: N/A;   • WOUND DEBRIDEMENT N/A 8/1/2021    Procedure: DEBRIDEMENT OF ABDOMINAL WALL;  Surgeon: Hill Bhatia DO;  Location: [facility] MAIN OR;   Service: General;  Laterality: N/A;     Social History     Socioeconomic History   • Marital status:    Tobacco Use   • Smoking status: Former     Years: 15.00     Types: Cigarettes     Start date: 1966     Quit date: 5/10/2020     Years since quittin.7   • Smokeless tobacco: Never   Vaping Use   • Vaping Use: Never used   Substance and Sexual Activity   • Alcohol use: Not Currently   • Drug use: No   • Sexual activity: Defer       Allergies:  Allergies   Allergen Reactions   • Haloperidol Hives   • Morphine Itching   • Pantoprazole Other (See Comments)     Feels sick after receiving   • Latex Unknown - High Severity       Medications:    Current Facility-Administered Medications:   •  acetaminophen (TYLENOL) tablet 325 mg, 325 mg, Oral, Q4H PRN, Yusuf Jones MD  •  atorvastatin (LIPITOR) tablet 40 mg, 40 mg, Oral, QAM, Julissa Gunn, APRN, 40 mg at 23 0619  •  sennosides-docusate (PERICOLACE) 8.6-50 MG per tablet 2 tablet, 2 tablet, Oral, BID **AND** polyethylene glycol (MIRALAX) packet 17 g, 17 g, Oral, Daily **AND** bisacodyl (DULCOLAX) EC tablet 5 mg, 5 mg, Oral, Daily PRN **AND** bisacodyl (DULCOLAX) suppository 10 mg, 10 mg, Rectal, Daily PRN, Connor Galindo MD  •  dextrose (D50W) (25 g/50 mL) IV injection 25 g, 25 g, Intravenous, Q15 Min PRN, Julissa Gunn, APRN  •  dextrose (GLUTOSE) oral gel 15 g, 15 g, Oral, Q15 Min PRN, Julissa Gunn, APRN  •  docusate sodium (COLACE) capsule 200 mg, 200 mg, Oral, BID, Julissa Gunn, APRN, 200 mg at 23 0831  •  Enoxaparin Sodium (LOVENOX) syringe 120 mg, 120 mg, Subcutaneous, Q12H, Julissa Gunn, APRN, 120 mg at 23 0021  •  escitalopram (LEXAPRO) tablet 10 mg, 10 mg, Oral, Daily, Julissa Gunn, APRN, 10 mg at 23 0831  •  famotidine (PEPCID) injection 20 mg, 20 mg, Intravenous, Daily, Julissa Gunn APRN, 20 mg at 2333  •  glipizide (GLUCOTROL) tablet 5 mg, 5 mg, Oral, [facility] AC, Julissa Gunn APRN, 5 mg  at 02/08/23 0831  •  glucagon (human recombinant) (GLUCAGEN DIAGNOSTIC) 1 mg in sterile water (preservative free) 1 mL injection, 1 mg, Intramuscular, Q15 Min PRN, Julissa Gunn APRN  •  hydrALAZINE (APRESOLINE) injection 10 mg, 10 mg, Intravenous, Q6H PRN, Julissa Gunn APRN  •  HYDROmorphone (DILAUDID) injection 0.5 mg, 0.5 mg, Intravenous, Q3H PRN, Yusuf Jones MD, 0.5 mg at 02/08/23 0833  •  insulin lispro (ADMELOG) injection 2-9 Units, 2-9 Units, Subcutaneous, TID With Meals, Julissa Gunn APRN  •  ipratropium-albuterol (DUO-NEB) nebulizer solution 3 mL, 3 mL, Nebulization, Q4H PRN, Julissa Gunn APRN, 3 mL at 02/08/23 0934  •  isosorbide mononitrate (IMDUR) 24 hr tablet 120 mg, 120 mg, Oral, Q24H, Julissa Gunn APRN, 120 mg at 02/08/23 0831  •  metoprolol tartrate (LOPRESSOR) injection 5 mg, 5 mg, Intravenous, Q6H PRN, Julissa Gunn APRN  •  metoprolol tartrate (LOPRESSOR) tablet 50 mg, 50 mg, Oral, BID With Meals, Julissa Gunn, APRN, 50 mg at 02/08/23 0832  •  ondansetron (ZOFRAN) tablet 4 mg, 4 mg, Oral, Q6H PRN **OR** ondansetron (ZOFRAN) injection 4 mg, 4 mg, Intravenous, Q6H PRN, Julissa Gunn APRN, 4 mg at 02/07/23 1131  •  oxyCODONE (ROXICODONE) immediate release tablet 10 mg, 10 mg, Oral, Q4H PRN, Julissa Gunn APRN  •  Pharmacy to Dose enoxaparin (LOVENOX), , Does not apply, Continuous PRN, Julissa Gunn APRN  •  pregabalin (LYRICA) capsule 150 mg, 150 mg, Oral, BID, Julissa Gunn, APRN, 150 mg at 02/08/23 0830  •  promethazine (PHENERGAN) 12.5 mg in sodium chloride 0.9 % 50 mL, 12.5 mg, Intravenous, Q6H PRN, Uma Neal APRN, Stopped at 02/07/23 0930  •  QUEtiapine (SEROquel) tablet 50 mg, 50 mg, Oral, Nightly, Julissa Gunn APRN, 50 mg at 02/07/23 2048  •  ranolazine (RANEXA) 12 hr tablet 1,000 mg, 1,000 mg, Oral, Q12H, Julissa Gunn APRN, 1,000 mg at 02/08/23 0830  •  rOPINIRole (REQUIP) tablet 1 mg, 1 mg, Oral, Daily, Julissa Gunn, APRN, 1 mg at  02/08/23 0832  •  [COMPLETED] Insert Peripheral IV, , , Once **AND** sodium chloride 0.9 % flush 10 mL, 10 mL, Intravenous, PRN, Jeaneth Patel, APRN  •  sodium chloride 0.9 % flush 3 mL, 3 mL, Intravenous, Q12H, [place], Julissa M, APRN, 3 mL at 02/08/23 0837  •  sodium chloride 0.9 % flush 3-10 mL, 3-10 mL, Intravenous, PRN, [place], Julissa M, APRN  •  sodium chloride 0.9 % infusion 40 mL, 40 mL, Intravenous, PRN, [place], Julissa M, APRN  •  sodium chloride 0.9 % infusion, 50 mL/hr, Intravenous, Continuous, [place], Julissa M, APRN, Stopped at 02/07/23 1604    Results Review:  Lab Results (last 48 hours)     Procedure Component Value Units Date/Time    POC Glucose Once [[id number]]  (Normal) Collected: 02/08/23 1238    Specimen: Blood Updated: 02/08/23 1240     Glucose 77 mg/dL      Comment: Serial Number: [id number]:  [id number]       POC Glucose Once [[id number]]  (Normal) Collected: 02/08/23 1215    Specimen: Blood Updated: 02/08/23 1217     Glucose 78 mg/dL      Comment: Serial Number: [id number]:  [id number]       Blood Culture - Blood, Arm, Right [[id number]]  (Normal) Collected: 02/06/23 1151    Specimen: Blood from Arm, Right Updated: 02/08/23 1200     Blood Culture No growth at 2 days    Blood Culture - Blood, Arm, Right [[id number]]  (Normal) Collected: 02/06/23 1048    Specimen: Blood from Arm, Right Updated: 02/08/23 1100     Blood Culture No growth at 2 days    Narrative:      Less than seven (7) mL's of blood was collected.  Insufficient quantity may yield false negative results.    CBC & Differential [[id number]]  (Abnormal) Collected: 02/08/23 0857    Specimen: Blood Updated: 02/08/23 0937    Narrative:      The following orders were created for panel order CBC & Differential.  Procedure                               Abnormality         Status                     ---------                               -----------         ------                     CBC Auto Differential[[id number]]        Abnormal             Final result                 Please view results for these tests on the individual orders.    CBC Auto Differential [[id number]]  (Abnormal) Collected: 02/08/23 0857    Specimen: Blood Updated: 02/08/23 0937     WBC 7.00 10*3/mm3      RBC 4.31 10*6/mm3      Hemoglobin 10.0 g/dL      Hematocrit 32.8 %      MCV 76.0 fL      MCH 23.1 pg      MCHC 30.5 g/dL      RDW 21.2 %      RDW-SD 59.5 fl      MPV 8.5 fL      Platelets 390 10*3/mm3      Neutrophil % 63.2 %      Lymphocyte % 18.4 %      Monocyte % 13.2 %      Eosinophil % 4.2 %      Basophil % 1.0 %      Neutrophils, Absolute 4.40 10*3/mm3      Lymphocytes, Absolute 1.30 10*3/mm3      Monocytes, Absolute 0.90 10*3/mm3      Eosinophils, Absolute 0.30 10*3/mm3      Basophils, Absolute 0.10 10*3/mm3      nRBC 0.1 /100 WBC     POC Glucose Once [[id number]]  (Normal) Collected: 02/08/23 0707    Specimen: Blood Updated: 02/08/23 0709     Glucose 97 mg/dL      Comment: Serial Number: [id number]:  [id number]       Basic Metabolic Panel [[id number]]  (Abnormal) Collected: 02/08/23 0526    Specimen: Blood Updated: 02/08/23 0659     Glucose 116 mg/dL      BUN 54 mg/dL      Creatinine 2.48 mg/dL      Sodium 140 mmol/L      Potassium 4.3 mmol/L      Comment: Slight hemolysis detected by analyzer. Results may be affected.        Chloride 97 mmol/L      CO2 34.0 mmol/L      Calcium 7.8 mg/dL      BUN/Creatinine Ratio 21.8     Anion Gap 9.0 mmol/L      eGFR 26.2 mL/min/1.73     Narrative:      GFR Normal >60  Chronic Kidney Disease <60  Kidney Failure <15    The GFR formula is only valid for adults with stable renal function between ages 18 and 70.    POC Glucose Once [[id number]]  (Abnormal) Collected: 02/07/23 1722    Specimen: Blood Updated: 02/07/23 1724     Glucose 122 mg/dL      Comment: Serial Number: [id number]:  [id number]       POC Glucose Once [[id number]]  (Abnormal) Collected: 02/07/23 1600    Specimen: Blood Updated: 02/07/23 1721     Glucose 119 mg/dL       Comment: Serial Number: [id number]:  [id number]       POC Glucose Once [[id number]]  (Abnormal) Collected: 02/07/23 1113    Specimen: Blood Updated: 02/07/23 1114     Glucose 167 mg/dL      Comment: Serial Number: [id number]:  [id number]       Urine Culture - Urine, Urine, Clean Catch [[id number]]  (Normal) Collected: 02/06/23 0902    Specimen: Urine, Clean Catch Updated: 02/07/23 1023     Urine Culture No growth    POC Glucose Once [[id number]]  (Abnormal) Collected: 02/07/23 0750    Specimen: Blood Updated: 02/07/23 0751     Glucose 186 mg/dL      Comment: Serial Number: [id number]:  [id number]       CBC & Differential [[id number]]  (Abnormal) Collected: 02/07/23 0700    Specimen: Blood Updated: 02/07/23 0741    Narrative:      The following orders were created for panel order CBC & Differential.  Procedure                               Abnormality         Status                     ---------                               -----------         ------                     CBC Auto Differential[[id number]]        Abnormal            Final result                 Please view results for these tests on the individual orders.    CBC Auto Differential [[id number]]  (Abnormal) Collected: 02/07/23 0700    Specimen: Blood Updated: 02/07/23 0741     WBC 10.20 10*3/mm3      RBC 4.76 10*6/mm3      Hemoglobin 11.3 g/dL      Hematocrit 36.7 %      MCV 77.2 fL      MCH 23.7 pg      MCHC 30.6 g/dL      RDW 21.3 %      RDW-SD 60.4 fl      MPV 8.4 fL      Platelets 544 10*3/mm3      Neutrophil % 77.9 %      Lymphocyte % 9.0 %      Monocyte % 12.1 %      Eosinophil % 0.4 %      Basophil % 0.6 %      Neutrophils, Absolute 7.90 10*3/mm3      Lymphocytes, Absolute 0.90 10*3/mm3      Monocytes, Absolute 1.20 10*3/mm3      Eosinophils, Absolute 0.00 10*3/mm3      Basophils, Absolute 0.10 10*3/mm3      nRBC 0.1 /100 WBC     Basic Metabolic Panel [[id number]]  (Abnormal) Collected: 02/07/23 0700    Specimen: Blood Updated:  02/07/23 0735     Glucose 201 mg/dL      BUN 41 mg/dL      Creatinine 1.90 mg/dL      Sodium 144 mmol/L      Potassium 3.9 mmol/L      Chloride 95 mmol/L      CO2 38.0 mmol/L      Calcium 9.0 mg/dL      BUN/Creatinine Ratio 21.6     Anion Gap 11.0 mmol/L      eGFR 36.1 mL/min/1.73     Narrative:      GFR Normal >60  Chronic Kidney Disease <60  Kidney Failure <15    The GFR formula is only valid for adults with stable renal function between ages 18 and 70.    POC Glucose Once [[id number]]  (Abnormal) Collected: 02/07/23 0017    Specimen: Blood Updated: 02/07/23 0020     Glucose 192 mg/dL      Comment: Serial Number: [id number]:  [id number]       POC Glucose Once [[id number]]  (Abnormal) Collected: 02/06/23 1808    Specimen: Blood Updated: 02/06/23 1810     Glucose 169 mg/dL      Comment: Serial Number: [id number]:  [id number]       POC Glucose Once [[id number]]  (Abnormal) Collected: 02/06/23 1450    Specimen: Blood Updated: 02/06/23 1453     Glucose 220 mg/dL      Comment: Serial Number: [id number]:  [id number]           Imaging Results (Last 72 Hours)     Procedure Component Value Units Date/Time    CT Abdomen Pelvis Without Contrast [[id number]] Collected: 02/06/23 0959     Updated: 02/06/23 1007    Narrative:      CT ABDOMEN PELVIS WO CONTRAST    Date of Exam: 2/6/2023 9:48 AM EST    Indication: vomiting, hx bowel obstruct..  Chronic kidney disease stage III.    Comparison: CT abdomen and pelvis 12/7/2022.    Technique: Axial CT images were obtained of the abdomen and pelvis without the administration of contrast. Sagittal and coronal reconstructions were performed.  Automated exposure control and iterative reconstruction methods were used. IV contrast could   not be administered secondary to patient's impaired renal function.    Findings:  Dense posterior bilateral lower lobe airspace disease is new since prior examination, suggesting pneumonia or sequelae of aspiration. Heart size is within normal  limits with signs of prior CABG. Coronary artery calcifications are present.    Staghorn calculus in the left lower renal pole is again noted, on series 3 image 67 measuring 2.0 x 0.7 cm. No right renal cyst. No ureteral stone or hydronephrosis. Low-density right renal lesion measuring 2.3 cm is favored to represent a cyst.    Noncontrast appearance of the liver, spleen, pancreas, adrenals is within normal limits. Cholecystectomy changes are present.    Severe abnormal dilation of small bowel with fecal material and air fluid levels, consistent with the appearance of a high-grade small bowel obstruction. Transition zone is thought most likely to be within the right lower quadrant abdomen anteriorly,   protruding within an area of abdominal wall laxity in the patient's pannus formation.    No pneumatosis or free air or free fluid is identified. There is moderately advanced abdominal aortic calcific atherosclerosis. 3.5 cm fusiform infrarenal abdominal aortic aneurysm is seen (series 3 image 72).    A clip or implant is seen within a nonenlarged prostate gland. Urinary bladder is decompressed. Rectum is normal.    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 Moderate facet arthropathy is present at multiple lumbar levels. No acute osseous abnormal normalities are identified.      Impression:      Impression:    1. Findings consistent with the appearance of high-grade small bowel obstruction.  2. There is laxity of the anterior abdominal wall, with transition zone of decompressed small bowel thought to be within right lower quadrant pannus formation.  There are 3. 3.5 cm fusiform infrarenal abdominal aortic aneurysm.  4. Partial staghorn calculus in the left kidney. No ureteral stone. No hydronephrosis.  5. Dense patchy posterior bilateral lower lobe airspace disease may represent pneumonia or sequelae of aspiration.    Electronically Signed: Ekaterina Goins    2/6/2023 10:04 AM EST     Workstation ID: [id number]          Review of Systems:  Review of Systems   Constitutional: Negative.    HENT: Negative.    Respiratory: Negative.    Cardiovascular: Negative for chest pain and leg swelling.   Gastrointestinal: Negative.    Musculoskeletal: Positive for myalgias.   Skin: Positive for wound.   Psychiatric/Behavioral: Negative.        Physical Assessment:  Wound 10/18/22 0820 Left elbow Incision (Active)   Wound Image   02/07/23 1801                                                         Wound 02/07/23 2037 Right gluteal (Active)   Wound Image   02/07/23 2037                                         Patient has 2 small stage II pressure injuries to the really low sacrum.  Each are superficial and mid dermal in nature.  Approximate size of each 0.5 x 0.5 cm.  There is no exudate noted from them.    Additionally patient does have a couple of abrasions to his knees.  He is not sure how they that occurred the right knee is open.  Applied Mepilex dressings to each knee.    Final diagnosis   stage II sacral pressure injury    Recommendation and Plan  Patient presenting with 2 stage II pressure injuries very low sacral be difficult to secure dressing.  Will recommend continuing with the zinc oxide-based barrier cream.  I did provide the patient with a chair cushion so that he can take this home and utilize it in his recliner at home.  He is encouraged however to try to get off of the area and position off of it.    Iqra Wilkins, APRN   2/8/2023   13:04 EST

## 2023-02-09 NOTE — CASE MANAGEMENT/SOCIAL WORK
Social Work Assessment  [facility]     Patient Name: Ro Nathan  MRN: [id number]  Today's Date: 2/9/2023    Admit Date: 2/6/2023       Psychosocial     Row Name 02/09/23 1144       Values/Beliefs    Spiritual, Cultural Beliefs, Christianity Practices, Values that Affect Care no (P)        Behavior WDL    Behavior WDL WDL (P)        Emotion Mood WDL    Emotion/Mood/Affect WDL WDL (P)        Speech WDL    Speech WDL WDL (P)        Perceptual State WDL    Perceptual State WDL WDL (P)        Thought Process WDL    Thought Process WDL WDL (P)        Intellectual Performance WDL    Intellectual Performance WDL WDL (P)        Coping/Stress    Major Change/Loss/Stressor none (P)     Patient Personal Strengths able to adapt (P)     Sources of Support spouse (P)     Reaction to Health Status accepting (P)     Understanding of Condition and Treatment adequate understanding of medical condition (P)     Coping/Stress Comments Pt mentions his wife being primary source of support, lives at home with wife. Mentions no stressors prior to hospital admittance. (P)        Developmental Stage (Eriksson's)    Developmental Stage Stage 8 (66 years-death/Late Adulthood) Integrity vs. Despair (P)        C-SSRS (Recent)    Q1 Wished to be Dead (Past Month) no (P)     Q2 Suicidal Thoughts (Past Month) no (P)     Q6 Suicide Behavior (Lifetime) no (P)        Violence Risk    Feels Like Hurting Others no (P)     Previous Attempt to Harm Others no (P)                Abuse/Neglect     Row Name 02/09/23 1146       Personal Safety    Feels Unsafe at Home or Work/School no (P)                Legal     Row Name 02/09/23 1146       Financial/Legal    Source of Income pension/jail (P)     Finance Comments Pt draws jail, retired from police department. (P)                Substance Abuse     Row Name 02/09/23 1146       Substance Use    Substance Use Status current caffeine use (P)     Caffeine Type coffee (P)     Caffeine Amount/Frequency  1-2 cups/cans per day (P)        AUDIT-C (Alcohol Use Disorders ID Test)    Q1: How often do you have a drink containing alcohol? Never (P)     Q2: How many drinks containing alcohol do you have on a typical day when you are drinking? None (P)     Q3: How often do you have six or more drinks on one occasion? Never (P)     Audit-C Score 0 (P)                    Debo Jones  [facility] [place] [facility] Student   [phone]  [phone]  [email or web address]

## 2023-02-09 NOTE — PLAN OF CARE
Problem: Nausea and Vomiting  Goal: Fluid and Electrolyte Balance  Outcome: Ongoing, Progressing     Problem: Fluid Deficit (Intestinal Obstruction)  Goal: Fluid Balance  Outcome: Ongoing, Progressing     Problem: Infection (Intestinal Obstruction)  Goal: Absence of Infection Signs and Symptoms  Outcome: Ongoing, Progressing     Problem: Nausea and Vomiting (Intestinal Obstruction)  Goal: Nausea and Vomiting Relief  Outcome: Ongoing, Progressing     Problem: Pain (Intestinal Obstruction)  Goal: Acceptable Pain Control  Outcome: Ongoing, Progressing     Problem: COPD (Chronic Obstructive Pulmonary Disease) Comorbidity  Goal: Maintenance of COPD Symptom Control  Outcome: Ongoing, Progressing  Intervention: Maintain COPD-Symptom Control  Recent Flowsheet Documentation  Taken 2/9/2023 1420 by Hafsa Vega RN  Medication Review/Management: medications reviewed  Taken 2/9/2023 1240 by Hafsa Vega RN  Medication Review/Management: medications reviewed  Taken 2/9/2023 1020 by Hafsa Vega RN  Medication Review/Management: medications reviewed  Taken 2/9/2023 0840 by Hafsa Vega RN  Medication Review/Management: medications reviewed     Problem: Diabetes Comorbidity  Goal: Blood Glucose Level Within Targeted Range  Outcome: Ongoing, Progressing     Problem: Heart Failure Comorbidity  Goal: Maintenance of Heart Failure Symptom Control  Outcome: Ongoing, Progressing  Intervention: Maintain Heart Failure-Management  Recent Flowsheet Documentation  Taken 2/9/2023 1420 by Hafsa Vega RN  Medication Review/Management: medications reviewed  Taken 2/9/2023 1240 by Hafsa Vega RN  Medication Review/Management: medications reviewed  Taken 2/9/2023 1020 by Hafsa Vega RN  Medication Review/Management: medications reviewed  Taken 2/9/2023 0840 by Hafsa Vega RN  Medication Review/Management: medications reviewed     Problem: Hypertension Comorbidity  Goal: Blood Pressure in Desired Range  Outcome:  Ongoing, Progressing  Intervention: Maintain Blood Pressure Management  Recent Flowsheet Documentation  Taken 2/9/2023 1420 by Hafsa Vega RN  Medication Review/Management: medications reviewed  Taken 2/9/2023 1240 by Hafsa Vega RN  Medication Review/Management: medications reviewed  Taken 2/9/2023 1020 by Hafsa Vega RN  Medication Review/Management: medications reviewed  Taken 2/9/2023 0840 by Hafsa Vgea RN  Medication Review/Management: medications reviewed     Problem: Obstructive Sleep Apnea Risk or Actual Comorbidity Management  Goal: Unobstructed Breathing During Sleep  Outcome: Ongoing, Progressing     Problem: Osteoarthritis Comorbidity  Goal: Maintenance of Osteoarthritis Symptom Control  Outcome: Ongoing, Progressing  Intervention: Maintain Osteoarthritis Symptom Control  Recent Flowsheet Documentation  Taken 2/9/2023 1420 by Hafsa Vega RN  Medication Review/Management: medications reviewed  Taken 2/9/2023 1240 by Hafsa Vega RN  Activity Management: activity adjusted per tolerance  Medication Review/Management: medications reviewed  Taken 2/9/2023 1020 by Hafsa Vega RN  Medication Review/Management: medications reviewed  Taken 2/9/2023 0840 by Hafsa Vega RN  Activity Management: activity adjusted per tolerance  Medication Review/Management: medications reviewed     Problem: Skin Injury Risk Increased  Goal: Skin Health and Integrity  Outcome: Ongoing, Progressing  Intervention: Optimize Skin Protection  Recent Flowsheet Documentation  Taken 2/9/2023 1240 by Hafsa Vega RN  Head of Bed (HOB) Positioning: HOB elevated  Taken 2/9/2023 0840 by Hafsa Vega RN  Head of Bed (HOB) Positioning: HOB elevated     Problem: Fall Injury Risk  Goal: Absence of Fall and Fall-Related Injury  Outcome: Ongoing, Progressing  Intervention: Identify and Manage Contributors  Recent Flowsheet Documentation  Taken 2/9/2023 1420 by Hafsa Vega RN  Medication Review/Management:  medications reviewed  Taken 2/9/2023 1240 by Hafsa Vega RN  Medication Review/Management: medications reviewed  Self-Care Promotion: independence encouraged  Taken 2/9/2023 1020 by Hafsa Vega RN  Medication Review/Management: medications reviewed  Taken 2/9/2023 0840 by Hafsa Vega RN  Medication Review/Management: medications reviewed  Intervention: Promote Injury-Free Environment  Recent Flowsheet Documentation  Taken 2/9/2023 1420 by Hafsa Vega, RN  Safety Promotion/Fall Prevention: safety round/check completed  Taken 2/9/2023 1240 by Hafsa Vega RN  Safety Promotion/Fall Prevention: safety round/check completed  Taken 2/9/2023 1020 by Hafsa Vega RN  Safety Promotion/Fall Prevention: safety round/check completed  Taken 2/9/2023 0840 by Hafsa Vega, RN  Safety Promotion/Fall Prevention: safety round/check completed   Goal Outcome Evaluation:

## 2023-02-09 NOTE — PROGRESS NOTES
LOS: 3 days   Patient Care Team:  Yusuf Jones MD as PCP - General  [facility]iology[facility]  Shirley, Moise Kathleen MD as Consulting Physician (Cardiac Electrophysiology)  Izzy Alvarez MD as Consulting Physician (Nephrology)  Celine Swanson MD as Consulting Physician (Cardiology)    Subjective:  Difficult to arouse this morning    Objective:   Afebrile//he has been coughing//chest x-ray reviewed      Review of Systems:   Review of Systems   Unable to perform ROS: Other           Vital Signs  Temp:  [97.1 °F (36.2 °C)-98.2 °F (36.8 °C)] 97.3 °F (36.3 °C)  Heart Rate:  [68-77] 74  Resp:  [14-18] 16  BP: ()/(41-55) 91/55    Physical Exam:  Physical Exam  Vitals reviewed.   Cardiovascular:      Rate and Rhythm: Normal rate.      Heart sounds: Normal heart sounds.   Pulmonary:      Breath sounds: Rhonchi present.   Neurological:      Mental Status: He is alert.          Radiology:  CT Abdomen Pelvis Without Contrast    Result Date: 2/6/2023  Impression: 1. Findings consistent with the appearance of high-grade small bowel obstruction. 2. There is laxity of the anterior abdominal wall, with transition zone of decompressed small bowel thought to be within right lower quadrant pannus formation. There are 3. 3.5 cm fusiform infrarenal abdominal aortic aneurysm. 4. Partial staghorn calculus in the left kidney. No ureteral stone. No hydronephrosis. 5. Dense patchy posterior bilateral lower lobe airspace disease may represent pneumonia or sequelae of aspiration. Electronically Signed: Ekaterina Goins  2/6/2023 10:04 AM EST  Workstation ID: [id number]    XR Chest 1 View    Result Date: 2/8/2023  Impression: Cardiomegaly with pulmonary vascular congestion and bilateral basilar atelectasis. There is a small left effusion. Electronically Signed: Sanchez Balderas  2/8/2023 10:46 PM EST  Workstation ID: [id number]         Results Review:     I reviewed the patient's new  clinical results.  I reviewed the patient's new imaging results and agree with the interpretation.    Medication Review:   Scheduled Meds:atorvastatin, 40 mg, Oral, QAM  docusate sodium, 200 mg, Oral, BID  enoxaparin, 120 mg, Subcutaneous, Q12H  escitalopram, 10 mg, Oral, Daily  famotidine, 20 mg, Intravenous, Daily  glipizide, 5 mg, Oral, QAM AC  insulin lispro, 2-9 Units, Subcutaneous, TID With Meals  isosorbide mononitrate, 120 mg, Oral, Q24H  metoprolol tartrate, 50 mg, Oral, BID With Meals  senna-docusate sodium, 2 tablet, Oral, BID   And  polyethylene glycol, 17 g, Oral, Daily  pregabalin, 150 mg, Oral, BID  QUEtiapine, 50 mg, Oral, Nightly  ranolazine, 1,000 mg, Oral, Q12H  rOPINIRole, 1 mg, Oral, Daily  sodium chloride, 3 mL, Intravenous, Q12H      Continuous Infusions:Pharmacy to Dose enoxaparin (LOVENOX),   sodium chloride, 50 mL/hr, Last Rate: 50 mL/hr (02/08/23 1620)      PRN Meds:.•  acetaminophen  •  benzonatate  •  senna-docusate sodium **AND** polyethylene glycol **AND** bisacodyl **AND** bisacodyl  •  dextrose  •  dextrose  •  glucagon (human recombinant)  •  hydrALAZINE  •  HYDROmorphone  •  ipratropium-albuterol  •  metoprolol tartrate  •  ondansetron **OR** ondansetron  •  oxyCODONE  •  Pharmacy to Dose enoxaparin (LOVENOX)  •  promethazine  •  [COMPLETED] Insert Peripheral IV **AND** sodium chloride  •  sodium chloride  •  sodium chloride    Labs:    CBC    Results from last 7 days   Lab Units 02/08/23  0857 02/07/23  0700 02/06/23  0750   WBC 10*3/mm3 7.00 10.20 13.60*   HEMOGLOBIN g/dL 10.0* 11.3* 12.0*   PLATELETS 10*3/mm3 390 544* 543*     BMP   Results from last 7 days   Lab Units 02/09/23  0532 02/08/23  0526 02/07/23  0700 02/06/23  0750   SODIUM mmol/L 137 140 144 136   POTASSIUM mmol/L 3.5 4.3 3.9 4.6   CHLORIDE mmol/L 97* 97* 95* 95*   CO2 mmol/L 32.0* 34.0* 38.0* 26.0   BUN mg/dL 42* 54* 41* 34*   CREATININE mg/dL 1.76* 2.48* 1.90* 1.96*   GLUCOSE mg/dL 119* 116* 201* 235*   MAGNESIUM  mg/dL  --   --   --  2.3     Cr Clearance Estimated Creatinine Clearance: 47.8 mL/min (A) (by C-G formula based on SCr of 1.76 mg/dL (H)).  Coag     HbA1C   Lab Results   Component Value Date    HGBA1C 7.3 (H) 10/12/2022    HGBA1C 8.5 (H) 06/02/2022    HGBA1C 8.5 (H) 04/19/2022     Blood Glucose   Glucose   Date/Time Value Ref Range Status   02/09/2023 0744 116 (H) 70 - 105 mg/dL Final     Comment:     Serial Number: [id number]:  [id number]   02/08/2023 1724 118 (H) 70 - 105 mg/dL Final     Comment:     Serial Number: [id number]:  [id number]   02/08/2023 1238 77 70 - 105 mg/dL Final     Comment:     Serial Number: [id number]:  [id number]   02/08/2023 1215 78 70 - 105 mg/dL Final     Comment:     Serial Number: [id number]:  [id number]   02/08/2023 0707 97 70 - 105 mg/dL Final     Comment:     Serial Number: [id number]:  [id number]   02/07/2023 1722 122 (H) 70 - 105 mg/dL Final     Comment:     Serial Number: [id number]:  [id number]   02/07/2023 1600 119 (H) 70 - 105 mg/dL Final     Comment:     Serial Number: [id number]:  [id number]   02/07/2023 1113 167 (H) 70 - 105 mg/dL Final     Comment:     Serial Number: [id number]:  [id number]     Infection   Results from last 7 days   Lab Units 02/06/23  1151 02/06/23  1048 02/06/23  0902   BLOODCX  No growth at 2 days No growth at 2 days  --    URINECX   --   --  No growth     CMP   Results from last 7 days   Lab Units 02/09/23  0532 02/08/23  0526 02/07/23  0700 02/06/23  0750   SODIUM mmol/L 137 140 144 136   POTASSIUM mmol/L 3.5 4.3 3.9 4.6   CHLORIDE mmol/L 97* 97* 95* 95*   CO2 mmol/L 32.0* 34.0* 38.0* 26.0   BUN mg/dL 42* 54* 41* 34*   CREATININE mg/dL 1.76* 2.48* 1.90* 1.96*   GLUCOSE mg/dL 119* 116* 201* 235*   ALBUMIN g/dL  --   --   --  3.8   BILIRUBIN mg/dL  --   --   --  0.6   ALK PHOS U/L  --   --   --  153*   AST (SGOT) U/L  --   --   --  15   ALT (SGPT) U/L  --   --   --  5   LIPASE U/L  --   --   --  18     UA     Results from last 7 days   Lab Units 02/06/23  0902   NITRITE UA  Negative   WBC UA /HPF 0-2*   BACTERIA UA /HPF Trace*   SQUAM EPITHEL UA /HPF 0-2   URINECX  No growth     Radiology(recent) XR Chest 1 View    Result Date: 2/8/2023  Impression: Cardiomegaly with pulmonary vascular congestion and bilateral basilar atelectasis. There is a small left effusion. Electronically Signed: Sanchez Balderas  2/8/2023 10:46 PM EST  Workstation ID: [id number]     Assessment:    Recurrent high-grade small bowel obstruction  MOSD  Acute left pleural effusion  Inoperable massive ventral herniation  Peptic ulcer disease  History of erosive gastritis  History of grade B esophagitis  Gastroesophageal reflux disease with esophagitis  Herbert's esophageal change  DM II with renal manifestations  DMII with angiopathic manifestations  DMII with neuropathic manifestations  Diabetic dyslipidemia  Panlobular COPD with emphysema  Nicotine dependency with nicotine use disorder, cigarettes  DDD L/S  CKD IIIa  HTN associated with CKD IIIa  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  Cirrhosis  Small vessel PAD secondary to diabetes  Diabetic lower extremity ulcerations  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, MOSD  History of right renal mass  3.4 cm infrarenal abdominal aortic aneurysm  Valvular heart disease with significant mitral regurgitation  Paroxysmal atrial fibrillation  Hypercoagulable state secondary to atrial fibrillation  Chronic oral anticoagulation therapy with direct thrombin inhibition  ACD  Chronic systolic congestive heart failure  Chronic hypoxic respiratory failure  Supplemental oxygen dependency  Chronic mucopurulent bronchitis  History of prostate cancer  Morbid exogenous obesity  Hypoventilation apnea syndrome with ARTI  Peripheral polyneuropathy secondary to alcohol and diabetes  Autonomic neuropathy secondary to diabetes with autonomic dysfunction syndrome  Monoclonal paraproteinemia  Narcotic dependency  Vitamin  D deficiency  ETOHISM  H/O pacemaker placement    Plan:  Ambulate today/medication modification//observe        Yusuf Jones MD  02/09/23  08:46 EST

## 2023-02-09 NOTE — PLAN OF CARE
Goal Outcome Evaluation:      Pt up on and off throughout the night, c/o back pain and requests dilaudid frequently. Pt has tolerated full liquid diet w/ no issues. Surgery currently following for SBO, will continue to monitor.

## 2023-02-09 NOTE — PROGRESS NOTES
General Surgery Progress Note    Name: Ro Nathan ADMIT: 2023   : 1945  PCP: Yusuf Jones MD    MRN: [id number] LOS: 3 days   AGE/SEX: 77 y.o. male  ROOM: [address] [place] [address]    Chief Complaint   Patient presents with   • Abdominal Pain     Subjective     77 y.o. male admitted with recurrent acute on chronic bowel obstruction    Has had a couple bowel movements minimal pain tolerating a diet wants to eat more wants to go home.    Objective     Scheduled Medications:   atorvastatin, 40 mg, Oral, QAM  docusate sodium, 200 mg, Oral, BID  enoxaparin, 120 mg, Subcutaneous, Q12H  escitalopram, 10 mg, Oral, Daily  famotidine, 20 mg, Intravenous, Daily  glipizide, 5 mg, Oral, QAM AC  insulin lispro, 2-9 Units, Subcutaneous, TID With Meals  ipratropium-albuterol, 3 mL, Nebulization, 4x Daily - RT  isosorbide mononitrate, 120 mg, Oral, Q24H  metoprolol tartrate, 50 mg, Oral, BID With Meals  senna-docusate sodium, 2 tablet, Oral, BID   And  polyethylene glycol, 17 g, Oral, Daily  pregabalin, 150 mg, Oral, BID  QUEtiapine, 50 mg, Oral, Nightly  ranolazine, 1,000 mg, Oral, Q12H  rOPINIRole, 1 mg, Oral, Daily  sodium chloride, 3 mL, Intravenous, Q12H        Active Infusions:  Pharmacy to Dose enoxaparin (LOVENOX),         As Needed Medications:  •  acetaminophen  •  benzonatate  •  senna-docusate sodium **AND** polyethylene glycol **AND** bisacodyl **AND** bisacodyl  •  dextrose  •  dextrose  •  glucagon (human recombinant)  •  hydrALAZINE  •  HYDROmorphone  •  metoprolol tartrate  •  ondansetron **OR** ondansetron  •  oxyCODONE  •  Pharmacy to Dose enoxaparin (LOVENOX)  •  promethazine  •  [COMPLETED] Insert Peripheral IV **AND** sodium chloride  •  sodium chloride  •  sodium chloride    Vital Signs  Vital Signs   Patient Vitals for the past 24 hrs:   BP Temp Temp src Pulse Resp SpO2 Weight   23 1035 -- -- -- 70 16 100 % --   23 1030 -- -- -- 72 16 99 % --   23 0830 91/55 97.3 °F (36.3 °C)  Oral -- 16 -- --   02/09/23 0620 -- -- -- 74 16 98 % --   02/09/23 0615 -- -- -- 73 16 98 % --   02/09/23 0501 99/54 97.1 °F (36.2 °C) Oral 68 15 95 % 117 kg (257 lb 0.9 oz)   02/08/23 1943 108/55 98.2 °F (36.8 °C) Oral 77 18 96 % --   02/08/23 1724 113/53 97.4 °F (36.3 °C) Oral 71 16 95 % --   02/08/23 1708 -- -- -- 73 16 100 % --   02/08/23 1705 -- -- -- -- 16 -- --   02/08/23 1700 106/52 -- -- -- -- -- --   02/08/23 1630 112/43 -- -- -- -- -- --   02/08/23 1623 105/43 -- -- -- -- -- --   02/08/23 1600 97/49 -- -- -- -- -- --   02/08/23 1456 108/50 -- -- 75 -- 93 % --   02/08/23 1455 90/51 -- -- -- -- -- --   02/08/23 1401 95/51 -- -- -- -- -- --   02/08/23 1200 95/41 97.8 °F (36.6 °C) Oral 76 16 96 % --       Physical Exam:  Physical Exam  Constitutional:       Appearance: Normal appearance.   HENT:      Head: Normocephalic and atraumatic.   Cardiovascular:      Rate and Rhythm: Normal rate.   Pulmonary:      Effort: Pulmonary effort is normal. No respiratory distress.   Abdominal:      Palpations: Abdomen is soft.      Comments: Once again minimal tenderness to palpation   Skin:     General: Skin is warm and dry.   Neurological:      General: No focal deficit present.      Mental Status: He is alert. Mental status is at baseline.   Psychiatric:         Mood and Affect: Mood normal.         Behavior: Behavior normal.         Results Review:     CBC    Results from last 7 days   Lab Units 02/09/23  0959 02/08/23  0857 02/07/23  0700 02/06/23  0750   WBC 10*3/mm3 6.20 7.00 10.20 13.60*   HEMOGLOBIN g/dL 9.1* 10.0* 11.3* 12.0*   PLATELETS 10*3/mm3 330 390 544* 543*     BMP   Results from last 7 days   Lab Units 02/09/23  0532 02/08/23  0526 02/07/23  0700 02/06/23  0750   SODIUM mmol/L 137 140 144 136   POTASSIUM mmol/L 3.5 4.3 3.9 4.6   CHLORIDE mmol/L 97* 97* 95* 95*   CO2 mmol/L 32.0* 34.0* 38.0* 26.0   BUN mg/dL 42* 54* 41* 34*   CREATININE mg/dL 1.76* 2.48* 1.90* 1.96*   GLUCOSE mg/dL 119* 116* 201* 235*    MAGNESIUM mg/dL  --   --   --  2.3     Radiology(recent) XR Chest 1 View    Result Date: 2/8/2023  Impression: Cardiomegaly with pulmonary vascular congestion and bilateral basilar atelectasis. There is a small left effusion. Electronically Signed: Sanchez Balderas  2/8/2023 10:46 PM EST  Workstation ID: [id number]      I reviewed the patient's new clinical results.  I reviewed the patient's new imaging results and agree with the interpretation.    Assessment & Plan       Small bowel obstruction (HCC)    Stage 2 skin ulcer of sacral region (HCC)      77 y.o. male admitted with multiple recurrences of acute on chronic bowel obstruction secondary to large ventral hernia with complete loss of abdominal domain    Low residue diet  From my standpoint should stay on a low residue diet and/or full liquid diet for a lot of his calories  Seems to be okay for discharge my standpoint        This note was created using Dragon Voice Recognition software.    Connor Galindo MD  02/09/23  11:01 EST

## 2023-02-10 PROBLEM — Z79.84 LONG TERM (CURRENT) USE OF ORAL HYPOGLYCEMIC DRUGS: Status: ACTIVE | Noted: 2022-01-01

## 2023-02-10 PROBLEM — E11.51 TYPE 2 DIABETES MELLITUS WITH DIABETIC PERIPHERAL ANGIOPATHY WITHOUT GANGRENE (HCC): Status: ACTIVE | Noted: 2022-01-01

## 2023-02-10 NOTE — CASE MANAGEMENT/SOCIAL WORK
Case Management Discharge Note      Final Note: Home with wife and her sister with [facility] who accepted.             Home Medical Care Coordination complete.    Service Provider Selected Services Address Phone Fax Patient Preferred    [facility] [address] [place] [address] [phone] --                            Transportation Services  Private: Car    Final Discharge Disposition Code: 06 - home with home health care

## 2023-02-10 NOTE — PLAN OF CARE
Problem: Pain (Intestinal Obstruction)  Goal: Acceptable Pain Control  Intervention: Monitor and Manage Pain  Description: Determine pain management plan with patient and caregiver/family; review plan regularly.  Use a consistent, validated tool for pain assessment; evaluate pain level, effect of treatment and patient’s response at regular intervals.  Monitor for increasing severity and constancy of pain and tenderness that may occur with intestinal ischemia or perforation.  Prepare patient for surgery if obstruction not relieved with conservative measures.  Consider the presence and impact of preexisting chronic pain.  Encourage patient and caregiver involvement in pain assessment, interventions and safety measures.  Individualize intravenous pharmacologic pain management plan; titrate medication to patient response.  Manage medication-induced effects, such as constipation, nausea, vomiting.  Initiate individualized nonpharmacologic pain management measures.  Consider and address emotional response to pain.  Recent Flowsheet Documentation  Taken 2/10/2023 0400 by Kirill Yoder LPN  Pain Management Interventions:   see MAR   quiet environment facilitated   position adjusted   pain management plan reviewed with patient/caregiver  Taken 2/10/2023 0231 by Kirill Yoder LPN  Pain Management Interventions:   see MAR   quiet environment facilitated   position adjusted   pain management plan reviewed with patient/caregiver  Taken 2/10/2023 0030 by Kirill Yoder LPN  Pain Management Interventions:   see MAR   quiet environment facilitated   position adjusted   pillow support provided   pain management plan reviewed with patient/caregiver  Taken 2/9/2023 2158 by Kirill Yoder LPN  Pain Management Interventions:   quiet environment facilitated   see MAR   position adjusted   pain management plan reviewed with patient/caregiver     Problem: COPD (Chronic Obstructive Pulmonary Disease)  Comorbidity  Goal: Maintenance of COPD Symptom Control  Intervention: Maintain COPD-Symptom Control  Description: Evaluate adherence to management plan (e.g., medication, trigger avoidance, infection prevention, self-monitoring).  Advocate for continuation of home regimen, including medication, method of delivery, schedule and symptom monitoring.  Anticipate the need for breathing techniques and activity pacing to minimize fatigue and breathlessness.  Assess for proper use of inhaled medication and delivery technique; assist or reinstruct if needed.  Evaluate effectiveness of coping skills; encourage expression of feelings, expectations and concerns related to disease management and quality of life; reinforce education to enhance management plan and wellbeing.  Recent Flowsheet Documentation  Taken 2/10/2023 0400 by Kirill Yoder LPN  Medication Review/Management: medications reviewed  Taken 2/10/2023 0200 by Kirill Yoder LPN  Medication Review/Management: medications reviewed  Taken 2/10/2023 0030 by Kirill Yoder LPN  Medication Review/Management: medications reviewed  Taken 2/9/2023 2200 by Kirill Yoder LPN  Medication Review/Management: medications reviewed     Problem: Diabetes Comorbidity  Goal: Blood Glucose Level Within Targeted Range  Intervention: Monitor and Manage Glycemia  Description: Establish target blood glucose levels based on patient-specific factors, such as age, diabetes-related complications and illness severity.  Document blood glucose levels and monitor trend; advocate for adjustment to keep within targeted range.  Provide pharmacologic therapy to maintain blood glucose levels within targeted range.  Check blood glucose level if there is a change in mental or cognitive status.  Recognize, treat and document hypoglycemia event and potential cause.  Avoid hypoglycemic episodes by advocating for insulin dose adjustment when there is a change in condition, such as  illness severity, decreased oral intake, missed or refused meals and snacks, as well as medication change that may include steroid tape  Recent Flowsheet Documentation  Taken 2/9/2023 2035 by Kirill Yoder LPN  Glycemic Management: blood glucose monitored     Problem: Heart Failure Comorbidity  Goal: Maintenance of Heart Failure Symptom Control  Intervention: Maintain Heart Failure-Management  Description: Evaluate adherence to home heart failure self-care regimen (e.g., medication, fluid balance, sodium intake, daily weight, physical activity, telemonitoring, support).  Advocate continuation of home medication and schedule.  Consider pharmacologic therapy administration time and effects (e.g., avoid giving diuretic prior to bedtime or nitrates on empty stomach).  Monitor response to pharmacologic therapy, including weight fluctuations, blood pressure and electrolyte levels.  Monitor for signs and symptoms of anxiety and depression, including severity and duration; if present, provide psychosocial support.  Consider need for heart failure clinic or palliative care consult.  Recent Flowsheet Documentation  Taken 2/10/2023 0400 by Kirill Yoder LPN  Medication Review/Management: medications reviewed  Taken 2/10/2023 0200 by Kirill Yoder LPN  Medication Review/Management: medications reviewed  Taken 2/10/2023 0030 by Kirill Yoder LPN  Medication Review/Management: medications reviewed  Taken 2/9/2023 2200 by Kirill Yoder LPN  Medication Review/Management: medications reviewed     Problem: Hypertension Comorbidity  Goal: Blood Pressure in Desired Range  Intervention: Maintain Blood Pressure Management  Description: Evaluate adherence to home antihypertensive regimen (e.g., exercise and activity, diet modification, medication).  Provide scheduled antihypertensive medication; consider administration time and effects (e.g., avoid giving diuretic prior to bedtime).  Monitor response to  antihypertensive medication therapy (e.g., blood pressure, electrolyte levels, medication effects).  Minimize risk of orthostatic hypotension; encourage caution with position changes, particularly if elderly.  Recent Flowsheet Documentation  Taken 2/10/2023 0400 by Kirill Yoder LPN  Medication Review/Management: medications reviewed  Taken 2/10/2023 0200 by Kirill Yoder LPN  Medication Review/Management: medications reviewed  Taken 2/10/2023 0030 by Kirill Yoder LPN  Medication Review/Management: medications reviewed  Taken 2/9/2023 2200 by Kirill Yoder LPN  Medication Review/Management: medications reviewed     Problem: Osteoarthritis Comorbidity  Goal: Maintenance of Osteoarthritis Symptom Control  Intervention: Maintain Osteoarthritis Symptom Control  Description: Evaluate adherence to self-management plan, such as medication, exercise and weight management.  Advocate for continuation of home regimen, such as medication, physical activity and thermal agents; monitor response.  Encourage participation in functional activities, such as mobility and ADLs (activities of daily living) to minimize decline associated with inactivity.  Facilitate use of patient-specific assistive devices, equipment or orthoses.  Evaluate effectiveness of coping skills; encourage expression of feelings, expectations and concerns related to disease management and quality of life; reinforce education to enhance management plan and wellbeing.  Recent Flowsheet Documentation  Taken 2/10/2023 0400 by Kirill Yoder LPN  Activity Management: activity encouraged  Assistive Device Utilized: walker  Medication Review/Management: medications reviewed  Taken 2/10/2023 0231 by Kirill Yoder LPN  Activity Management:   activity adjusted per tolerance   activity encouraged   up in chair  Assistive Device Utilized: walker  Taken 2/10/2023 0200 by Kirill Yoder LPN  Activity Management:   activity  adjusted per tolerance   activity encouraged   up in chair   up to bedside commode  Assistive Device Utilized: walker  Medication Review/Management: medications reviewed  Taken 2/10/2023 0030 by Kirill Yoder LPN  Activity Management:   activity adjusted per tolerance   up to bedside commode   up in chair  Assistive Device Utilized: walker  Medication Review/Management: medications reviewed  Taken 2/9/2023 2200 by Kirill Yoder LPN  Activity Management:   activity adjusted per tolerance   activity encouraged   up in chair   up to bedside commode  Assistive Device Utilized: walker  Medication Review/Management: medications reviewed  Taken 2/9/2023 2050 by Kirill Yoder LPN  Activity Management: up in chair  Assistive Device Utilized: walker  Taken 2/9/2023 2035 by Kirill Yoder LPN  Activity Management:   activity adjusted per tolerance   activity encouraged   up in chair   up to bedside commode     Problem: Skin Injury Risk Increased  Goal: Skin Health and Integrity  Intervention: Optimize Skin Protection  Description: Perform a full pressure injury risk assessment, as indicated by screening, upon admission to care unit.  Reassess skin (injury risk, full inspection) frequently (e.g., scheduled interval, with change in condition) to provide optimal early detection and prevention.  Maintain adequate tissue perfusion (e.g., encourage fluid balance; avoid crossing legs, constrictive clothing or devices) to promote tissue oxygenation.  Maintain head of bed at lowest degree of elevation tolerated, considering medical condition and other restrictions.  Avoid positioning onto an area that remains reddened.  Minimize incontinence and moisture (e.g., toileting schedule; moisture-wicking pad, diaper or incontinence collection device; skin moisture barrier).  Cleanse skin promptly and gently when soiled utilizing a pH-balanced cleanser.  Relieve and redistribute pressure (e.g., scheduled position  changes, weight shifts, use of support surface, medical device repositioning, protective dressing application, use of positioning device, microclimate control, use of pressure-injury-monitor  Encourage increased activity, such as sitting in a chair at the bedside or early mobilization, when able to tolerate.  Recent Flowsheet Documentation  Taken 2/10/2023 0400 by Kirill Yoder LPN  Head of Bed (HOB) Positioning:   HOB at 30 degrees   HOB at 30-45 degrees  Taken 2/10/2023 0231 by Kirill Yoder LPN  Head of Bed (HOB) Positioning: HOB at 20-30 degrees  Taken 2/10/2023 0200 by Kirill Yoder LPN  Head of Bed (HOB) Positioning: HOB at 20-30 degrees  Taken 2/10/2023 0030 by Kirill Yoder LPN  Head of Bed (HOB) Positioning: HOB at 30-45 degrees  Taken 2/9/2023 2200 by Kirill Yoder LPN  Head of Bed (HOB) Positioning:   HOB at 30 degrees   HOB at 30-45 degrees  Taken 2/9/2023 2035 by Kirill Yoder LPN  Pressure Reduction Techniques: frequent weight shift encouraged  Head of Bed (HOB) Positioning:   HOB at 20-30 degrees   HOB at 30-45 degrees  Pressure Reduction Devices: pressure-redistributing mattress utilized  Skin Protection: incontinence pads utilized     Problem: Fall Injury Risk  Goal: Absence of Fall and Fall-Related Injury  Intervention: Identify and Manage Contributors  Description: Develop a fall prevention plan with the patient and caregiver/family.  Provide reorientation, appropriate sensory stimulation and routines with changes in mental status to decrease risk of fall.  Promote use of personal vision and auditory aids.  Assess assistance level required for safe and effective self-care; provide support as needed, such as toileting, mobilization. For age 65 and older, implement timed toileting with assistance.  Encourage physical activity, such as performance of mobility and self-care at highest level of patient ability, multicomponent exercise program and provision of  appropriate assistive devices.  If fall occurs, assess the severity of injury; implement fall injury protocol. Determine the cause and revise fall injury prevention plan.  Regularly review medication contribution to fall risk; adjust medication administration times to minimize risk of falling.  Consider risk related to polypharmacy and age.  Balance adequate pain management with potential for oversedation.  Recent Flowsheet Documentation  Taken 2/10/2023 0400 by Kirill Yoder LPN  Medication Review/Management: medications reviewed  Taken 2/10/2023 0200 by Kirill Yoder LPN  Medication Review/Management: medications reviewed  Taken 2/10/2023 0030 by Kirill Yoder LPN  Medication Review/Management: medications reviewed  Taken 2/9/2023 2200 by Kirill Yoder LPN  Medication Review/Management: medications reviewed  Intervention: Promote Injury-Free Environment  Description: Provide a safe, barrier-free environment that encourages independent activity.  Keep care area uncluttered and well-lighted.  Determine need for increased observation or monitoring.  Avoid use of devices that minimize mobility, such as restraints or indwelling urinary catheter.  Recent Flowsheet Documentation  Taken 2/10/2023 0400 by Kirill Yoder LPN  Safety Promotion/Fall Prevention:   safety round/check completed   room organization consistent   nonskid shoes/slippers when out of bed   muscle strengthening facilitated   mobility aid in reach   lighting adjusted   fall prevention program maintained   clutter free environment maintained   assistive device/personal items within reach   activity supervised  Taken 2/10/2023 0200 by Kirill Yoder LPN  Safety Promotion/Fall Prevention:   safety round/check completed   room organization consistent   nonskid shoes/slippers when out of bed   muscle strengthening facilitated   mobility aid in reach   lighting adjusted   fall prevention program maintained   clutter  free environment maintained   assistive device/personal items within reach   activity supervised  Taken 2/10/2023 0030 by Kirill Yoder LPN  Safety Promotion/Fall Prevention:   safety round/check completed   room organization consistent   nonskid shoes/slippers when out of bed   muscle strengthening facilitated   mobility aid in reach   lighting adjusted   fall prevention program maintained   clutter free environment maintained   assistive device/personal items within reach  Taken 2/9/2023 2200 by Kirill Yoder LPN  Safety Promotion/Fall Prevention:   safety round/check completed   room organization consistent   nonskid shoes/slippers when out of bed   muscle strengthening facilitated   mobility aid in reach   lighting adjusted   fall prevention program maintained   clutter free environment maintained   assistive device/personal items within reach   activity supervised  Taken 2/9/2023 2035 by Kirill Yoder LPN  Safety Promotion/Fall Prevention:   safety round/check completed   room organization consistent   nonskid shoes/slippers when out of bed   muscle strengthening facilitated   mobility aid in reach   lighting adjusted   fall prevention program maintained   clutter free environment maintained   assistive device/personal items within reach   activity supervised   Goal Outcome Evaluation:  Chronic pain control with prn med , Extrnal male cath used and change, VSS possible dc home in am , progress toward goal

## 2023-02-10 NOTE — DISCHARGE SUMMARY
Date of Discharge:  2/10/2023    Discharge Diagnosis:     Recurrent high-grade small bowel obstruction  MOSD  Acute left pleural effusion  Inoperable massive ventral herniation  Peptic ulcer disease  History of erosive gastritis  History of grade B esophagitis  Gastroesophageal reflux disease with esophagitis  Herbert's esophageal change  DM II with renal manifestations  DMII with angiopathic manifestations  DMII with neuropathic manifestations  Diabetic dyslipidemia  Panlobular COPD with emphysema  Nicotine dependency with nicotine use disorder, cigarettes  DDD L/S  CKD IIIa  HTN associated with CKD IIIa  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  Cirrhosis  Small vessel PAD secondary to diabetes  Diabetic lower extremity ulcerations  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, MOSD  History of right renal mass  3.4 cm infrarenal abdominal aortic aneurysm  Valvular heart disease with significant mitral regurgitation  Paroxysmal atrial fibrillation  Hypercoagulable state secondary to atrial fibrillation  Chronic oral anticoagulation therapy with direct thrombin inhibition  ACD  Chronic systolic congestive heart failure  Chronic hypoxic respiratory failure  Supplemental oxygen dependency  Chronic mucopurulent bronchitis  History of prostate cancer  Morbid exogenous obesity  Hypoventilation apnea syndrome with ARTI  Peripheral polyneuropathy secondary to alcohol and diabetes  Autonomic neuropathy secondary to diabetes with autonomic dysfunction syndrome  Monoclonal paraproteinemia  Narcotic dependency  Vitamin D deficiency  ETOHISM  H/O pacemaker placement    Presenting Problem/History of Present Illness  Active Hospital Problems    Diagnosis  POA   • **Small bowel obstruction (HCC) [K56.609]  Yes   • Stage 2 skin ulcer of sacral region (HCC) [L98.429]  Yes      Resolved Hospital Problems   No resolved problems to display.          Hospital Course  Patient is a 77 y.o. male who presented with an acute  high-grade small bowel obstruction.  He was admitted and bowel rest was initiated with nasogastric decompression.  General surgery was asked to evaluate the patient and with conservative measures he began to have improvement in his gut function with resolution of the bowel obstruction.  He was found to eat and have bowel movements without difficulty and he was sent home today for close outpatient follow-up with ourselves within 1 to 2 weeks time.  He remains a very poor surgical candidate with very high operative risk and thus we will not entertain outpatient surgical repair of this ventral hernia.  As always, he may call me on my personal cell phone with any issues.    Procedures Performed         Consults:   Consults     Date and Time Order Name Status Description    2/6/2023 10:21 AM Family Medicine Consult      2/6/2023 10:21 AM Surgery (on-call MD unless specified)            Pertinent Test Results:CT Abdomen Pelvis Without Contrast    Result Date: 2/6/2023  Impression: 1. Findings consistent with the appearance of high-grade small bowel obstruction. 2. There is laxity of the anterior abdominal wall, with transition zone of decompressed small bowel thought to be within right lower quadrant pannus formation. There are 3. 3.5 cm fusiform infrarenal abdominal aortic aneurysm. 4. Partial staghorn calculus in the left kidney. No ureteral stone. No hydronephrosis. 5. Dense patchy posterior bilateral lower lobe airspace disease may represent pneumonia or sequelae of aspiration. Electronically Signed: Ekaterina Goins  2/6/2023 10:04 AM EST  Workstation ID: [id number]    XR Chest 1 View    Result Date: 2/8/2023  Impression: Cardiomegaly with pulmonary vascular congestion and bilateral basilar atelectasis. There is a small left effusion. Electronically Signed: Sanchez Balderas  2/8/2023 10:46 PM EST  Workstation ID: [id number]      Imaging Results (Last 7 Days)     Procedure Component Value Units Date/Time    XR Chest 1 View  [[id number]] Collected: 02/08/23 2245     Updated: 02/08/23 2248    Narrative:      XR CHEST 1 VW    Date of Exam: 2/8/2023 10:31 PM EST    Indication: deep cough.    Comparison: November 26, 2022    Findings:  There is a left subclavian pacemaker device in place with leads overlying the right atrium and right ventricle. The heart is enlarged. There is mild pulmonary vascular congestion. There is a small left pleural effusion. There is bilateral basilar   atelectasis.      Impression:      Impression:  Cardiomegaly with pulmonary vascular congestion and bilateral basilar atelectasis. There is a small left effusion.    Electronically Signed: Sanchez Balderas    2/8/2023 10:46 PM EST    Workstation ID: [id number]    CT Abdomen Pelvis Without Contrast [[id number]] Collected: 02/06/23 0959     Updated: 02/06/23 1007    Narrative:      CT ABDOMEN PELVIS WO CONTRAST    Date of Exam: 2/6/2023 9:48 AM EST    Indication: vomiting, hx bowel obstruct..  Chronic kidney disease stage III.    Comparison: CT abdomen and pelvis 12/7/2022.    Technique: Axial CT images were obtained of the abdomen and pelvis without the administration of contrast. Sagittal and coronal reconstructions were performed.  Automated exposure control and iterative reconstruction methods were used. IV contrast could   not be administered secondary to patient's impaired renal function.    Findings:  Dense posterior bilateral lower lobe airspace disease is new since prior examination, suggesting pneumonia or sequelae of aspiration. Heart size is within normal limits with signs of prior CABG. Coronary artery calcifications are present.    Staghorn calculus in the left lower renal pole is again noted, on series 3 image 67 measuring 2.0 x 0.7 cm. No right renal cyst. No ureteral stone or hydronephrosis. Low-density right renal lesion measuring 2.3 cm is favored to represent a cyst.    Noncontrast appearance of the liver, spleen, pancreas, adrenals is within normal  limits. Cholecystectomy changes are present.    Severe abnormal dilation of small bowel with fecal material and air fluid levels, consistent with the appearance of a high-grade small bowel obstruction. Transition zone is thought most likely to be within the right lower quadrant abdomen anteriorly,   protruding within an area of abdominal wall laxity in the patient's pannus formation.    No pneumatosis or free air or free fluid is identified. There is moderately advanced abdominal aortic calcific atherosclerosis. 3.5 cm fusiform infrarenal abdominal aortic aneurysm is seen (series 3 image 72).    A clip or implant is seen within a nonenlarged prostate gland. Urinary bladder is decompressed. Rectum is normal.    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 Moderate facet arthropathy is present at multiple lumbar levels. No acute osseous abnormal normalities are identified.      Impression:      Impression:    1. Findings consistent with the appearance of high-grade small bowel obstruction.  2. There is laxity of the anterior abdominal wall, with transition zone of decompressed small bowel thought to be within right lower quadrant pannus formation.  There are 3. 3.5 cm fusiform infrarenal abdominal aortic aneurysm.  4. Partial staghorn calculus in the left kidney. No ureteral stone. No hydronephrosis.  5. Dense patchy posterior bilateral lower lobe airspace disease may represent pneumonia or sequelae of aspiration.    Electronically Signed: Ekaterina Goins    2/6/2023 10:04 AM EST    Workstation ID: [id number]              Condition on Discharge:  Fair    Vital Signs  Temp:  [97.3 °F (36.3 °C)-97.8 °F (36.6 °C)] 97.8 °F (36.6 °C)  Heart Rate:  [70-84] 76  Resp:  [16-18] 18  BP: ()/(55-64) 147/64    Physical Exam:     General Appearance:    Alert, cooperative, in no acute distress   Head:    Normocephalic, without obvious abnormality, atraumatic   Eyes:           Conjunctivae and sclerae  normal, no   icterus, no pallor, corneas clear, PERRLA   Throat:   No oral lesions, no thrush, oral mucosa moist   Neck:   No adenopathy, supple, trachea midline, no thyromegaly, no   carotid bruit, no JVD   Lungs:     Clear to auscultation,respirations regular, even and                  unlabored    Heart:    Regular rhythm and normal rate, normal S1 and S2, no            murmur, no gallop, no rub, no click   Chest Wall:    No abnormalities observed   Abdomen:     Normal bowel sounds, no masses, no organomegaly, soft        non-tender, non-distended, no guarding, no rebound                tenderness   Rectal:     Deferred   Extremities:   Moves all extremities well, no edema, no cyanosis, no             redness   Pulses:   Pulses palpable and equal bilaterally   Skin:   No bleeding, bruising or rash   Lymph nodes:   No palpable adenopathy   Neurologic:   Cranial nerves 2 - 12 grossly intact, sensation intact, DTR       present and equal bilaterally         Discharge Disposition  Home or Self Care    Discharge Medications     Discharge Medications      New Medications      Instructions Start Date   benzonatate 100 MG capsule  Commonly known as: TESSALON   100 mg, Oral, 3 Times Daily PRN      polyethylene glycol 17 g packet  Commonly known as: MIRALAX   17 g, Oral, Daily   Start Date: February 11, 2023        Continue These Medications      Instructions Start Date   atorvastatin 40 MG tablet  Commonly known as: LIPITOR   40 mg, Oral, Every Morning      docusate sodium 100 MG capsule  Commonly known as: COLACE   200 mg, Oral, 2 Times Daily      Eliquis 5 MG tablet tablet  Generic drug: apixaban   TAKE ONE TABLET BY MOUTH TWICE A DAY      escitalopram 10 MG tablet  Commonly known as: LEXAPRO   10 mg, Oral, Daily      esomeprazole 40 MG capsule  Commonly known as: nexIUM   40 mg, Oral, Daily      furosemide 40 MG tablet  Commonly known as: LASIX   40 mg, Oral, Every Morning      glimepiride 2 MG tablet  Commonly known  as: AMARYL   2 mg, Oral, Daily      ipratropium-albuterol 0.5-2.5 mg/3 ml nebulizer  Commonly known as: DUO-NEB   3 mL, Nebulization, 4 Times Daily PRN      isosorbide mononitrate 120 MG 24 hr tablet  Commonly known as: IMDUR   120 mg, Oral, Every 24 Hours Scheduled      metOLazone 5 MG tablet  Commonly known as: ZAROXOLYN   5 mg, Oral, 3 Times Weekly, Monday, Wednesday, Friday    As needed for weight gain.      metoprolol tartrate 50 MG tablet  Commonly known as: LOPRESSOR   50 mg, Oral, 2 Times Daily With Meals      mirtazapine 30 MG tablet  Commonly known as: REMERON   30 mg, Oral, Nightly      nitroglycerin 0.4 MG SL tablet  Commonly known as: NITROSTAT   0.4 mg, Sublingual, Every 5 Minutes PRN      oxyCODONE-acetaminophen  MG per tablet  Commonly known as: PERCOCET   1 tablet, Oral, Every 6 Hours PRN      pregabalin 150 MG capsule  Commonly known as: LYRICA   150 mg, Oral, 2 Times Daily      QUEtiapine 50 MG tablet  Commonly known as: SEROquel   50 mg, Oral, Nightly      ranolazine 1000 MG 12 hr tablet  Commonly known as: RANEXA   1,000 mg, Oral, Every 12 Hours Scheduled      rOPINIRole 1 MG tablet  Commonly known as: REQUIP   1 mg, Oral, Daily      sucralfate 1 g tablet  Commonly known as: CARAFATE   1 g, Oral, 4 Times Daily      Ventolin  (90 Base) MCG/ACT inhaler  Generic drug: albuterol sulfate HFA   2 puffs, Inhalation, Every 4 Hours PRN, May use every 4 to 6 hours         Stop These Medications    amoxicillin-clavulanate 500-125 MG per tablet  Commonly known as: AUGMENTIN     famotidine 20 MG tablet  Commonly known as: PEPCID     hydrOXYzine 25 MG tablet  Commonly known as: ATARAX     traZODone 100 MG tablet  Commonly known as: DESYREL            Discharge Diet:   Low residue  Activity at Discharge:   As tolerated       follow-up Appointments  Follow-up with us in the office within 1 to 2 weeks time  Future Appointments   Date Time Provider Department Center   3/16/2023 11:00 AM AMANDA GUPTA  [facility] DEVICE CHECK [facility]   3/16/2023 11:30 AM Celine Swanson MD [facility]   3/28/2023  1:50 PM Herberth Oconnor MD [facility]         Test Results Pending at Discharge  Pending Labs     Order Current Status    Blood Culture - Blood, Arm, Right Preliminary result    Blood Culture - Blood, Arm, Right Preliminary result           Yusuf Jones MD  02/10/23  08:23 EST

## 2023-02-11 NOTE — OUTREACH NOTE
Prep Survey    Flowsheet Row Responses   [facility] facility patient discharged from? [facility]   Is LACE score < 7 ? No   Eligibility Readm Mgmt   Discharge diagnosis SBO   Does the patient have one of the following disease processes/diagnoses(primary or secondary)? Other   Does the patient have Home health ordered? Yes   What is the Home health agency?  [facility]   Is there a DME ordered? No   Prep survey completed? Yes          Akilah VILLASENOR - Registered Nurse

## 2023-02-14 NOTE — OUTREACH NOTE
Medical Week 1 Survey    Flowsheet Row Responses   [facility] facility patient discharged from? [facility]   Does the patient have one of the following disease processes/diagnoses(primary or secondary)? Other   Week 1 attempt successful? No   Unsuccessful attempts Attempt 1          LAURA Liz Registered Nurse

## 2023-02-21 NOTE — OUTREACH NOTE
Medical Week 2 Survey    Flowsheet Row Responses   [facility] facility patient discharged from? [facility]   Does the patient have one of the following disease processes/diagnoses(primary or secondary)? Other   Week 2 attempt successful? No   Unsuccessful attempts Attempt 1          Lali PAGE - Licensed Nurse

## 2023-02-23 NOTE — OUTREACH NOTE
Medical Week 2 Survey    Flowsheet Row Responses   [facility] patient discharged from? [facility]   Does the patient have one of the following disease processes/diagnoses(primary or secondary)? Other   Week 2 attempt successful? Yes   Call start time [id number]   Call end time 1630   Meds reviewed with patient/caregiver? Yes   Is the patient having any side effects they believe may be caused by any medication additions or changes? No   Does the patient have all medications ordered at discharge? Yes   Is the patient taking all medications as directed (includes completed medication regime)? Yes   Does the patient have a primary care provider?  Yes   Does the patient have an appointment with their PCP within 7 days of discharge? Yes   Nursing Interventions Verified appointment date/time/provider   Has the patient kept scheduled appointments due by today? Yes   What is the Home health agency?  [facility]   Has home health visited the patient within 72 hours of discharge? Yes   Home health comments PT twice a week, RN once a week   Psychosocial issues? No   What is the patient's perception of their health status since discharge? Improving   Is the patient/caregiver able to teach back the hierarchy of who to call/visit for symptoms/problems? PCP, Specialist, Home health nurse, Urgent Care, ED, 911 Yes   Week 2 Call Completed? Yes          Ijeoma VILLASENOR - Registered Nurse

## 2023-03-05 PROBLEM — R11.2 NAUSEA AND VOMITING: Status: ACTIVE | Noted: 2023-01-01

## 2023-03-05 PROBLEM — E87.1 HYPONATREMIA: Status: ACTIVE | Noted: 2023-01-01

## 2023-03-05 NOTE — ED PROVIDER NOTES
Subjective   History of Present Illness  Chief complaint: Abdominal pain    77-year-old male presents with abdominal pain.  Patient states pain started last night and has been constant.  He has had some nausea and vomiting associated with this.  He states his abdomen feels bloated.  He has had bowel obstructions in the past and this feels similar.  He denies fever.  He has had no diarrhea.  He denies any alleviating or exacerbating factors.    History provided by:  Patient      Review of Systems   Constitutional: Negative for fever.   HENT: Negative for congestion.    Respiratory: Negative for cough and shortness of breath.    Cardiovascular: Negative for chest pain.   Gastrointestinal: Positive for abdominal pain, nausea and vomiting. Negative for diarrhea.   Musculoskeletal: Negative for back pain.   Skin: Negative for rash.   Neurological: Negative for headaches.       Past Medical History:   Diagnosis Date   • Alcoholic cirrhosis of liver with ascites ([facility]) 10/26/2020   • Benign hypertension with CKD (chronic kidney disease) stage III ([facility]) 10/26/2020   • Bruises easily    • CHF (congestive heart failure) ([facility])    • Chronic bronchitis with COPD (chronic obstructive pulmonary disease) ([facility]) 10/26/2020   • Chronic respiratory failure with hypoxia ([facility]) 10/26/2020   • Congenital heart defect    • Difficulty attaining erection    • Heart block    • Hernia of abdominal wall    • Hypertension    • Low back pain    • Pacemaker    • Peripheral vascular disease due to secondary diabetes ([facility]) 10/26/2020   • Poor circulation    • Prostate cancer ([facility])     prostate   • Shortness of breath    • Sleep apnea     using CPAP    • Stage 3a chronic kidney disease ([facility]) 10/26/2020   • Stented coronary artery 12/05/2019    MICHAEL to LAD   • Type 2 diabetes, controlled, with neuropathy ([facility]) 10/26/2020    no meds   • Type 2 diabetes, controlled, with neuropathy ([facility]) 10/26/2020       Allergies   Allergen Reactions   • Haloperidol Hives    • Morphine Itching   • Pantoprazole Other (See Comments)     Feels sick after receiving   • Asa [Aspirin] Hives   • Latex Unknown - High Severity       Past Surgical History:   Procedure Laterality Date   • ABDOMINAL SURGERY     • APPENDECTOMY     • BONE CURETTAGE Right 1/22/2021    Procedure: Wound excision with fifth metatarsal head resection, right foot.;  Surgeon: Josef Egan DPM;  Location: [facility] MAIN OR;  Service: Podiatry;  Laterality: Right;   • BONE INCISION AND DRAINAGE Right 10/5/2020    Procedure: Incision and drainage with debridement of all nonviable soft tissue and bone with bone biopsy, right foot.;  Surgeon: Josef Egan DPM;  Location: [facility] MAIN OR;  Service: Podiatry;  Laterality: Right;   • BRONCHOSCOPY N/A 4/23/2022    Procedure: BRONCHOSCOPY with bronchioalveolar lavage of right lower lobe;  Surgeon: Edwin Kline MD;  Location: [facility] ENDOSCOPY;  Service: Pulmonary;  Laterality: N/A;  pneumonia   • CARDIAC CATHETERIZATION N/A 12/5/2019    Procedure: Left Heart Cath;  Surgeon: Mirza Munson MD;  Location: [facility] CATH INVASIVE LOCATION;  Service: Cardiology   • CARDIAC CATHETERIZATION N/A 12/5/2019    Procedure: Stent MICHAEL coronary;  Surgeon: Mirza Munson MD;  Location: [facility] CATH INVASIVE LOCATION;  Service: Cardiology   • CARDIAC CATHETERIZATION N/A 10/18/2021    Procedure: Left Heart Cath and coronary angiogram;  Surgeon: Celine Swanson MD;  Location: [facility] CATH INVASIVE LOCATION;  Service: Cardiovascular;  Laterality: N/A;   • CARDIAC CATHETERIZATION N/A 10/18/2021    Procedure: Right Heart Cath;  Surgeon: Celine Swanson MD;  Location: [facility] CATH INVASIVE LOCATION;  Service: Cardiovascular;  Laterality: N/A;   • CHOLECYSTECTOMY     • ELBOW PROCEDURE      left   • ENDOSCOPY N/A 7/6/2022    Procedure: ESOPHAGOGASTRODUODENOSCOPY with biopsy x 1 area. bicap cautery, scelero therapy.;  Surgeon: Douglas Delgadillo MD;  Location: [facility] ENDOSCOPY;   Service: Gastroenterology;  Laterality: N/A;  ESOPHAGITIS, ANTREL ULCER, DUODENAL ULCER   • ENDOSCOPY N/A 11/27/2022    Procedure: ESOPHAGOGASTRODUODENOSCOPY with argon plasma coagulation of small bowel arteriovenous malformation;  Surgeon: Douglas Delgadillo MD;  Location: [facility] ENDOSCOPY;  Service: Gastroenterology;  Laterality: N/A;  post: small bowel AVM   • FOOT SURGERY      right foot   • HARDWARE REMOVAL Left 10/18/2022    Procedure: ELBOW HARDWARE REMOVAL;  Surgeon: Brad Jackson MD;  Location: [facility] MAIN OR;  Service: Orthopedics;  Laterality: Left;   • HERNIA REPAIR     • INCISION AND DRAINAGE OF WOUND Right 4/6/2021    Procedure: INCISION AND DRAINAGE OF RIGHT FOOT 5TH METATARSAL TO BONE AND PARTIAL 5TH METATARSAL RESECTION;  Surgeon: Josef Egan DPM;  Location: [facility] MAIN OR;  Service: Podiatry;  Laterality: Right;   • INCISION AND DRAINAGE OF WOUND Right 4/8/2021    Procedure: INCISION AND DRAINAGE BONE, DELAYED PRIMARY CLOSURE;  Surgeon: Josef Egan DPM;  Location: [facility] MAIN OR;  Service: Podiatry;  Laterality: Right;   • INTERVENTIONAL RADIOLOGY PROCEDURE N/A 12/5/2019    Procedure: Intravascular Ultrasound;  Surgeon: Mirza Munson MD;  Location: [facility] CATH INVASIVE LOCATION;  Service: Cardiology   • PACEMAKER IMPLANTATION     • IL RT/LT HEART CATHETERS N/A 12/5/2019    Procedure: Percutaneous Coronary Intervention;  Surgeon: Mirza Munson MD;  Location: [facility] CATH INVASIVE LOCATION;  Service: Cardiology   • WOUND DEBRIDEMENT Right 10/29/2020    Procedure: Preparation and debridement of foot wound with application of Integra wound graft, right foot.;  Surgeon: Josef Egan DPM;  Location: [facility] MAIN OR;  Service: Podiatry;  Laterality: Right;   • WOUND DEBRIDEMENT N/A 7/29/2021    Procedure: EXCISIONAL ABDOMINAL WOUND  DEBRIDEMENT;  Surgeon: Cleopatra Wang MD;  Location: [facility] MAIN OR;  Service: General;  Laterality: N/A;   • WOUND  "DEBRIDEMENT N/A 2021    Procedure: DEBRIDEMENT OF ABDOMINAL WALL;  Surgeon: Hill Bhatia DO;  Location: [facility] MAIN OR;  Service: General;  Laterality: N/A;       Family History   Problem Relation Age of Onset   • Alzheimer's disease Mother    • GI problems Father    • Heart disease Father        Social History     Socioeconomic History   • Marital status:    Tobacco Use   • Smoking status: Former     Years: 15.00     Types: Cigarettes     Start date: 1966     Quit date: 5/10/2020     Years since quittin.8   • Smokeless tobacco: Never   Vaping Use   • Vaping Use: Never used   Substance and Sexual Activity   • Alcohol use: Not Currently   • Drug use: No   • Sexual activity: Defer       /70   Pulse 72   Temp 98 °F (36.7 °C) (Oral)   Resp 17   Ht 182.9 cm (72\")   Wt 113 kg (250 lb)   SpO2 97%   BMI 33.91 kg/m²       Objective   Physical Exam  Vitals and nursing note reviewed.   Constitutional:       Appearance: He is well-developed.   HENT:      Head: Normocephalic and atraumatic.      Mouth/Throat:      Mouth: Mucous membranes are moist.   Cardiovascular:      Rate and Rhythm: Normal rate and regular rhythm.      Heart sounds: Normal heart sounds.   Pulmonary:      Effort: Pulmonary effort is normal. No respiratory distress.      Breath sounds: Normal breath sounds.   Abdominal:      General: Abdomen is protuberant. Bowel sounds are decreased.      Palpations: Abdomen is soft.      Tenderness: There is generalized abdominal tenderness. There is no guarding or rebound.   Skin:     General: Skin is warm and dry.   Neurological:      Mental Status: He is alert and oriented to person, place, and time.         Procedures           ED Course      Results for orders placed or performed during the hospital encounter of 23   Comprehensive Metabolic Panel    Specimen: Blood   Result Value Ref Range    Glucose 233 (H) 65 - 99 mg/dL    BUN 44 (H) 8 - 23 mg/dL    Creatinine 1.89 (H) 0.76 - " 1.27 mg/dL    Sodium 133 (L) 136 - 145 mmol/L    Potassium 4.6 3.5 - 5.2 mmol/L    Chloride 89 (L) 98 - 107 mmol/L    CO2 30.0 (H) 22.0 - 29.0 mmol/L    Calcium 9.7 8.6 - 10.5 mg/dL    Total Protein 8.1 6.0 - 8.5 g/dL    Albumin 4.3 3.5 - 5.2 g/dL    ALT (SGPT) 7 1 - 41 U/L    AST (SGOT) 15 1 - 40 U/L    Alkaline Phosphatase 172 (H) 39 - 117 U/L    Total Bilirubin 0.5 0.0 - 1.2 mg/dL    Globulin 3.8 gm/dL    A/G Ratio 1.1 g/dL    BUN/Creatinine Ratio 23.3 7.0 - 25.0    Anion Gap 14.0 5.0 - 15.0 mmol/L    eGFR 36.3 (L) >60.0 mL/min/1.73   Lipase    Specimen: Blood   Result Value Ref Range    Lipase 9 (L) 13 - 60 U/L   CBC Auto Differential    Specimen: Blood   Result Value Ref Range    WBC 10.20 3.40 - 10.80 10*3/mm3    RBC 5.19 4.14 - 5.80 10*6/mm3    Hemoglobin 12.3 (L) 13.0 - 17.7 g/dL    Hematocrit 39.3 37.5 - 51.0 %    MCV 75.8 (L) 79.0 - 97.0 fL    MCH 23.7 (L) 26.6 - 33.0 pg    MCHC 31.3 (L) 31.5 - 35.7 g/dL    RDW 22.4 (H) 12.3 - 15.4 %    RDW-SD 63.0 (H) 37.0 - 54.0 fl    MPV 8.1 6.0 - 12.0 fL    Platelets 511 (H) 140 - 450 10*3/mm3    Neutrophil % 84.1 (H) 42.7 - 76.0 %    Lymphocyte % 5.9 (L) 19.6 - 45.3 %    Monocyte % 7.4 5.0 - 12.0 %    Eosinophil % 1.9 0.3 - 6.2 %    Basophil % 0.7 0.0 - 1.5 %    Neutrophils, Absolute 8.60 (H) 1.70 - 7.00 10*3/mm3    Lymphocytes, Absolute 0.60 (L) 0.70 - 3.10 10*3/mm3    Monocytes, Absolute 0.80 0.10 - 0.90 10*3/mm3    Eosinophils, Absolute 0.20 0.00 - 0.40 10*3/mm3    Basophils, Absolute 0.10 0.00 - 0.20 10*3/mm3    nRBC 0.0 0.0 - 0.2 /100 WBC     CT Abdomen Pelvis Without Contrast    Result Date: 3/5/2023  Impression: 1.Multiple dilated small bowel loops with transition to decompressed more distal small bowel within a large pannus within the lower abdomen, similar to prior exam, and again concerning for small bowel obstruction. 2.Bibasilar consolidations, which could reflect aspiration or pneumonia. Electronically Signed: Rodney Ross  3/5/2023 2:05 PM EST   Workstation ID: [id number]                                         Medical Decision Making  Small bowel obstruction (HCC): acute illness or injury  Amount and/or Complexity of Data Reviewed  Labs: ordered. Decision-making details documented in ED Course.  Radiology: ordered. Decision-making details documented in ED Course.      Risk  Prescription drug management.  Decision regarding hospitalization.         Patient had the above evaluation.  Results were discussed with the patient.  CT of the abdomen and pelvis is showing evidence of small bowel obstruction.  White blood cell count is normal.  Kidney function appears to be near baseline.  Patient has had no vomiting in the emergency room.  He was given pain and nausea medicine.  I discussed with the nurse practitioner on-call for the primary doctor and the patient will be admitted for further evaluation and management.      Final diagnoses:   Small bowel obstruction (HCC)       ED Disposition  ED Disposition     ED Disposition   Decision to Admit    Condition   --    Comment   Level of Care: Med/Surg [1]   Admitting Physician: ERNESTO WINTER [[id number]]   Attending Physician: ERNESTO WINTER [[id number]]               No follow-up provider specified.       Medication List      No changes were made to your prescriptions during this visit.          Cam Rg MD  03/05/23 [id number]

## 2023-03-05 NOTE — ED NOTES
Nursing report ED to floor  Ro Nathan  77 y.o.  male    HPI:   Chief Complaint   Patient presents with    Abdominal Pain       Admitting doctor:   Jeane Sanders MD    Admitting diagnosis:   The encounter diagnosis was Small bowel obstruction (HCC).    Code status:   Current Code Status       Date Active Code Status Order ID Comments User Context       Prior            Allergies:   Haloperidol, Morphine, Pantoprazole, Asa [aspirin], and Latex    Isolation:  No active isolations     Fall Risk:  Fall Risk Assessment was completed, and patient is at high risk for falls.   Predictive Model Details         14 (Low) Factor Value    Calculated 3/5/2023 14:07 Age 77    Risk of Fall Model Musculoskeletal Assessment WDL     Active Peripheral IV Present     Imaging order in this encounter Present     Skin Assessment WDL     Financial Class Other     Magnesium not on file     Chloride 89 mmol/L     Respiratory Rate 17     Drug Use No     Tobacco Use Quit     Creatinine 1.89 mg/dL     Jasvir Scale not on file     Number of Distinct Medication Classes administered 2     Diastolic BP 76     Peripheral Vascular Assessment WDL     Cardiac Assessment X     Clinically Relevant Sex Not Female     Number of administrations of Analgesic Narcotics 1     Total Bilirubin 0.5 mg/dL     Gastrointestinal Assessment X     Potassium 4.6 mmol/L     ALT 7 U/L     Albumin 4.3 g/dL     Days after Admission 0.126     Calcium 9.7 mg/dL         Weight:       03/05/23  1100   Weight: 113 kg (250 lb)       Intake and Output  No intake or output data in the 24 hours ending 03/05/23 1442    Diet:        Most recent vitals:   Vitals:    03/05/23 1132 03/05/23 1133 03/05/23 1301 03/05/23 1401   BP: 119/52  149/76 140/70   Pulse:  70 70 72   Resp:       Temp:       TempSrc:       SpO2: 97%  98% 97%   Weight:       Height:           Active LDAs/IV Access:   Lines, Drains & Airways       Active LDAs       Name Placement date Placement time Site Days     Peripheral IV 03/05/23 1157 Anterior;Right Forearm 03/05/23  1157  Forearm  less than 1                    Skin Condition:   Skin Assessments (last day)       Date/Time Skin WDL    03/05/23 11:46:47 WDL             Labs (abnormal labs have a star):   Labs Reviewed   COMPREHENSIVE METABOLIC PANEL - Abnormal; Notable for the following components:       Result Value    Glucose 233 (*)     BUN 44 (*)     Creatinine 1.89 (*)     Sodium 133 (*)     Chloride 89 (*)     CO2 30.0 (*)     Alkaline Phosphatase 172 (*)     eGFR 36.3 (*)     All other components within normal limits    Narrative:     GFR Normal >60  Chronic Kidney Disease <60  Kidney Failure <15    The GFR formula is only valid for adults with stable renal function between ages 18 and 70.   LIPASE - Abnormal; Notable for the following components:    Lipase 9 (*)     All other components within normal limits   CBC WITH AUTO DIFFERENTIAL - Abnormal; Notable for the following components:    Hemoglobin 12.3 (*)     MCV 75.8 (*)     MCH 23.7 (*)     MCHC 31.3 (*)     RDW 22.4 (*)     RDW-SD 63.0 (*)     Platelets 511 (*)     Neutrophil % 84.1 (*)     Lymphocyte % 5.9 (*)     Neutrophils, Absolute 8.60 (*)     Lymphocytes, Absolute 0.60 (*)     All other components within normal limits   CBC AND DIFFERENTIAL    Narrative:     The following orders were created for panel order CBC & Differential.  Procedure                               Abnormality         Status                     ---------                               -----------         ------                     CBC Auto Differential[[id number]]        Abnormal            Final result                 Please view results for these tests on the individual orders.       LOC: Person, Place, Time, and Situation    Telemetry:  Med/Surg    Cardiac Monitoring Ordered: no    EKG:   No orders to display       Medications Given in the ED:   Medications   sodium chloride 0.9 % flush 10 mL (has no administration in time range)    HYDROmorphone (DILAUDID) injection 1 mg (1 mg Intravenous Given 3/5/23 1200)   ondansetron (ZOFRAN) injection 4 mg (4 mg Intravenous Given 3/5/23 1158)       Imaging results:  CT Abdomen Pelvis Without Contrast    Result Date: 3/5/2023  Impression: 1.Multiple dilated small bowel loops with transition to decompressed more distal small bowel within a large pannus within the lower abdomen, similar to prior exam, and again concerning for small bowel obstruction. 2.Bibasilar consolidations, which could reflect aspiration or pneumonia. Electronically Signed: Rodney Ross  3/5/2023 2:05 PM EST  Workstation ID: [id number]     Social issues:   Social History     Socioeconomic History    Marital status:    Tobacco Use    Smoking status: Former     Years: 15.00     Types: Cigarettes     Start date: 1966     Quit date: 5/10/2020     Years since quittin.8    Smokeless tobacco: Never   Vaping Use    Vaping Use: Never used   Substance and Sexual Activity    Alcohol use: Not Currently    Drug use: No    Sexual activity: Defer       NIH Stroke Scale:  Interval: (not recorded)  1a. Level of Consciousness: (not recorded)  1b. LOC Questions: (not recorded)  1c. LOC Commands: (not recorded)  2. Best Gaze: (not recorded)  3. Visual: (not recorded)  4. Facial Palsy: (not recorded)  5a. Motor Arm, Left: (not recorded)  5b. Motor Arm, Right: (not recorded)  6a. Motor Leg, Left: (not recorded)  6b. Motor Leg, Right: (not recorded)  7. Limb Ataxia: (not recorded)  8. Sensory: (not recorded)  9. Best Language: (not recorded)  10. Dysarthria: (not recorded)  11. Extinction and Inattention (formerly Neglect): (not recorded)    Total (NIH Stroke Scale): (not recorded)     Additional notable assessment information:       Nursing report ED to floor:  Thais Levi RN   23 14:42 EST

## 2023-03-06 NOTE — H&P
Patient Care Team:  Yusuf Jones MD as PCP - General  [facility] ([facility]iology[facility]  Moise Watson MD as Consulting Physician (Cardiac Electrophysiology)  Izzy Alvarez MD as Consulting Physician (Nephrology)  Celine Swanson MD as Consulting Physician (Cardiology)    Chief complaint abdominal pain, nausea, vomiting    Subjective     Patient is a 77 y.o. male who presents with complaints of abdominal pain since last night that has been constant. Complains of nausea, vomiting, and feeling bloated. He reports multiple bowel obstructions in the past and he feels this pain is similar to those episodes. Denies any fever, chills, diarrhea, chest pain, shortness of breath.   In the ED his CT abdomen pelvis showing evidence of small bowel obstruction and bibasilar consolidation likely aspiration or pneumonia and was started on IV rocephin, WBC normal, kidney function at baseline.      Onset of symptoms was 1 day     Review of Systems   Constitutional: Negative.    HENT: Negative.    Eyes: Negative.    Respiratory: Negative.    Cardiovascular: Negative.    Gastrointestinal: Positive for abdominal pain, nausea and vomiting. Negative for diarrhea.   Endocrine: Negative.    Genitourinary: Negative.    Musculoskeletal: Negative.    Skin: Negative.    Neurological: Negative.    Psychiatric/Behavioral: Negative.           History  Past Medical History:   Diagnosis Date   • Alcoholic cirrhosis of liver with ascites ([facility]) 10/26/2020   • Benign hypertension with CKD (chronic kidney disease) stage III ([facility]) 10/26/2020   • Bruises easily    • CHF (congestive heart failure) ([facility])    • Chronic bronchitis with COPD (chronic obstructive pulmonary disease) ([facility]) 10/26/2020   • Chronic respiratory failure with hypoxia ([facility]) 10/26/2020   • Congenital heart defect    • Difficulty attaining erection    • Heart block    • Hernia of abdominal wall    • Hypertension    • Low back  pain    • Pacemaker    • Peripheral vascular disease due to secondary diabetes ([facility]) 10/26/2020   • Poor circulation    • Prostate cancer ([facility])     prostate   • Shortness of breath    • Sleep apnea     using CPAP    • Stage 3a chronic kidney disease ([facility]) 10/26/2020   • Stented coronary artery 12/05/2019    MICHAEL to LAD   • Type 2 diabetes, controlled, with neuropathy ([facility]) 10/26/2020    no meds   • Type 2 diabetes, controlled, with neuropathy ([facility]) 10/26/2020     Past Surgical History:   Procedure Laterality Date   • ABDOMINAL SURGERY     • APPENDECTOMY     • BONE CURETTAGE Right 1/22/2021    Procedure: Wound excision with fifth metatarsal head resection, right foot.;  Surgeon: Josef Egan DPM;  Location: [facility] MAIN OR;  Service: Podiatry;  Laterality: Right;   • BONE INCISION AND DRAINAGE Right 10/5/2020    Procedure: Incision and drainage with debridement of all nonviable soft tissue and bone with bone biopsy, right foot.;  Surgeon: Josef Egan DPM;  Location: [facility] MAIN OR;  Service: Podiatry;  Laterality: Right;   • BRONCHOSCOPY N/A 4/23/2022    Procedure: BRONCHOSCOPY with bronchioalveolar lavage of right lower lobe;  Surgeon: Edwin Kline MD;  Location: [facility] ENDOSCOPY;  Service: Pulmonary;  Laterality: N/A;  pneumonia   • CARDIAC CATHETERIZATION N/A 12/5/2019    Procedure: Left Heart Cath;  Surgeon: Mirza Munson MD;  Location: [facility] CATH INVASIVE LOCATION;  Service: Cardiology   • CARDIAC CATHETERIZATION N/A 12/5/2019    Procedure: Stent MICHAEL coronary;  Surgeon: Mirza Munson MD;  Location: [facility] CATH INVASIVE LOCATION;  Service: Cardiology   • CARDIAC CATHETERIZATION N/A 10/18/2021    Procedure: Left Heart Cath and coronary angiogram;  Surgeon: Celine Swanson MD;  Location: [facility] CATH INVASIVE LOCATION;  Service: Cardiovascular;  Laterality: N/A;   • CARDIAC CATHETERIZATION N/A 10/18/2021    Procedure: Right Heart Cath;  Surgeon: Celine Swanson MD;  Location:  [facility] CATH INVASIVE LOCATION;  Service: Cardiovascular;  Laterality: N/A;   • CHOLECYSTECTOMY     • ELBOW PROCEDURE      left   • ENDOSCOPY N/A 7/6/2022    Procedure: ESOPHAGOGASTRODUODENOSCOPY with biopsy x 1 area. bicap cautery, scelero therapy.;  Surgeon: Douglas Delgadillo MD;  Location: [facility] ENDOSCOPY;  Service: Gastroenterology;  Laterality: N/A;  ESOPHAGITIS, ANTREL ULCER, DUODENAL ULCER   • ENDOSCOPY N/A 11/27/2022    Procedure: ESOPHAGOGASTRODUODENOSCOPY with argon plasma coagulation of small bowel arteriovenous malformation;  Surgeon: Douglas Delgadillo MD;  Location: [facility] ENDOSCOPY;  Service: Gastroenterology;  Laterality: N/A;  post: small bowel AVM   • FOOT SURGERY      right foot   • HARDWARE REMOVAL Left 10/18/2022    Procedure: ELBOW HARDWARE REMOVAL;  Surgeon: Brad Jackson MD;  Location: [facility] MAIN OR;  Service: Orthopedics;  Laterality: Left;   • HERNIA REPAIR     • INCISION AND DRAINAGE OF WOUND Right 4/6/2021    Procedure: INCISION AND DRAINAGE OF RIGHT FOOT 5TH METATARSAL TO BONE AND PARTIAL 5TH METATARSAL RESECTION;  Surgeon: Josef Egan DPM;  Location: [facility] MAIN OR;  Service: Podiatry;  Laterality: Right;   • INCISION AND DRAINAGE OF WOUND Right 4/8/2021    Procedure: INCISION AND DRAINAGE BONE, DELAYED PRIMARY CLOSURE;  Surgeon: Josef Egan DPM;  Location: [facility] MAIN OR;  Service: Podiatry;  Laterality: Right;   • INTERVENTIONAL RADIOLOGY PROCEDURE N/A 12/5/2019    Procedure: Intravascular Ultrasound;  Surgeon: Mirza Munson MD;  Location: [facility] CATH INVASIVE LOCATION;  Service: Cardiology   • PACEMAKER IMPLANTATION     • PA RT/LT HEART CATHETERS N/A 12/5/2019    Procedure: Percutaneous Coronary Intervention;  Surgeon: Mirza Munson MD;  Location: [facility] CATH INVASIVE LOCATION;  Service: Cardiology   • WOUND DEBRIDEMENT Right 10/29/2020    Procedure: Preparation and debridement of foot wound with application of Integra wound graft,  right foot.;  Surgeon: Josef Egan DPM;  Location: [facility] MAIN OR;  Service: Podiatry;  Laterality: Right;   • WOUND DEBRIDEMENT N/A 2021    Procedure: EXCISIONAL ABDOMINAL WOUND  DEBRIDEMENT;  Surgeon: Cleopatra Wang MD;  Location: [facility] MAIN OR;  Service: General;  Laterality: N/A;   • WOUND DEBRIDEMENT N/A 2021    Procedure: DEBRIDEMENT OF ABDOMINAL WALL;  Surgeon: Hill Bhatia DO;  Location: [facility] MAIN OR;  Service: General;  Laterality: N/A;     Family History   Problem Relation Age of Onset   • Alzheimer's disease Mother    • GI problems Father    • Heart disease Father      Social History     Tobacco Use   • Smoking status: Former     Years: 15.00     Types: Cigarettes     Start date: 1966     Quit date: 5/10/2020     Years since quittin.8   • Smokeless tobacco: Never   Vaping Use   • Vaping Use: Never used   Substance Use Topics   • Alcohol use: Not Currently   • Drug use: No     Medications Prior to Admission   Medication Sig Dispense Refill Last Dose   • atorvastatin (LIPITOR) 40 MG tablet Take 40 mg by mouth Every Morning.      • benzonatate (TESSALON) 100 MG capsule Take 1 capsule by mouth 3 (Three) Times a Day As Needed for Cough. 20 capsule 0    • docusate sodium (COLACE) 100 MG capsule Take 200 mg by mouth 2 (Two) Times a Day.      • Eliquis 5 MG tablet tablet TAKE ONE TABLET BY MOUTH TWICE A  tablet 3    • escitalopram (LEXAPRO) 10 MG tablet Take 10 mg by mouth Daily.      • esomeprazole (nexIUM) 40 MG capsule Take 40 mg by mouth Daily.      • furosemide (LASIX) 40 MG tablet Take 40 mg by mouth Every Morning.      • glimepiride (AMARYL) 2 MG tablet Take 1 tablet by mouth Daily.      • ipratropium-albuterol (DUO-NEB) 0.5-2.5 mg/3 ml nebulizer Take 3 mL by nebulization 4 (Four) Times a Day As Needed.      • isosorbide mononitrate (IMDUR) 120 MG 24 hr tablet Take 1 tablet by mouth Daily. 30 tablet 3    • metOLazone (ZAROXOLYN) 5 MG tablet Take 5 mg by mouth 3 (Three)  Times a Week. Monday, Wednesday, Friday    As needed for weight gain.      • metoprolol tartrate (LOPRESSOR) 50 MG tablet Take 50 mg by mouth 2 (Two) Times a Day With Meals.      • mirtazapine (REMERON) 30 MG tablet Take 30 mg by mouth Every Night.      • nitroglycerin (NITROSTAT) 0.4 MG SL tablet Place 0.4 mg under the tongue Every 5 (Five) Minutes As Needed for Chest Pain.      • oxyCODONE-acetaminophen (PERCOCET)  MG per tablet Take 1 tablet by mouth Every 6 (Six) Hours As Needed for Moderate Pain. 120 tablet 0    • polyethylene glycol (MIRALAX) 17 g packet Take 17 g by mouth Daily. 30 packet 1    • pregabalin (LYRICA) 150 MG capsule Take 1 capsule by mouth 2 (Two) Times a Day. 60 capsule 5    • QUEtiapine (SEROquel) 50 MG tablet Take 50 mg by mouth Every Night.      • ranolazine (RANEXA) 1000 MG 12 hr tablet Take 1,000 mg by mouth Every 12 (Twelve) Hours.      • rOPINIRole (REQUIP) 1 MG tablet Take 1 mg by mouth Daily.      • sucralfate (CARAFATE) 1 g tablet Take 1 tablet by mouth 4 (Four) Times a Day.      • VENTOLIN  (90 Base) MCG/ACT inhaler Inhale 2 puffs Every 4 (Four) Hours As Needed for Wheezing or Shortness of Air. May use every 4 to 6 hours        Allergies:  Haloperidol, Morphine, Pantoprazole, Asa [aspirin], and Latex    Objective     Vital Signs  Temp:  [97.5 °F (36.4 °C)-98 °F (36.7 °C)] 97.5 °F (36.4 °C)  Heart Rate:  [69-72] 71  Resp:  [17-18] 17  BP: (119-149)/(52-81) 132/72     Physical Exam:      General Appearance:    Alert, cooperative, in no acute distress   Head:    Normocephalic, without obvious abnormality, atraumatic   Eyes:            Lids and lashes normal, conjunctivae and sclerae normal, no   icterus, no pallor, corneas clear, PERRLA   Ears:    Ears appear intact with no abnormalities noted   Throat:   No oral lesions, no thrush, oral mucosa moist   Neck:   No adenopathy, supple, trachea midline, no thyromegaly, no   carotid bruit, no JVD   Lungs:     Clear to  auscultation,respirations regular, even and                  unlabored    Heart:    Regular rhythm and normal rate, normal S1 and S2, no            murmur, no gallop, no rub, no click   Chest Wall:    No abnormalities observed   Abdomen:     Bowel sounds decreased, no masses, no organomegaly, soft, generalized tenderness present, non-distended, no guarding, no rebound tenderness   Extremities:   Moves all extremities well, no edema, no cyanosis, no             redness   Pulses:   Pulses palpable and equal bilaterally   Skin:   No bleeding, bruising or rash   Lymph nodes:   No palpable adenopathy   Neurologic:   No focal deficits noted       Results Review:     Imaging Results (Last 24 Hours)     Procedure Component Value Units Date/Time    CT Abdomen Pelvis Without Contrast [[id number]] Collected: 03/05/23 1359     Updated: 03/05/23 1408    Narrative:      CT ABDOMEN PELVIS WO CONTRAST    Date of Exam: 3/5/2023 1:41 PM EST    Indication: abdominal pain and distention.    Comparison: February 6, 2023    Technique: Axial CT images were obtained of the abdomen and pelvis without the administration of contrast. Sagittal and coronal reconstructions were performed.  Automated exposure control and iterative reconstruction methods were used.     Findings:  Within the lung bases are bibasilar consolidations.    The unenhanced liver, adrenal glands, spleen, and pancreas are unremarkable. The gallbladder is surgically absent. There is a small right renal cyst. There is a staghorn left renal calculus.    The stomach is distended. There are multiple dilated small bowel loops similar to prior exam with transition point into decompressed more distal small bowel within a large pannus within the lower abdomen. The colon appears normal. The appendix is not   well-visualized. There is no ascites or loculated collection. No abnormally enlarged lymph nodes are identified.    The rectum and urinary bladder are unremarkable. Brachytherapy  seed implants are present in the prostate.    No aggressive osseous lesions are identified.      Impression:      Impression:  1.Multiple dilated small bowel loops with transition to decompressed more distal small bowel within a large pannus within the lower abdomen, similar to prior exam, and again concerning for small bowel obstruction.  2.Bibasilar consolidations, which could reflect aspiration or pneumonia.      Electronically Signed: Rodney Ross    3/5/2023 2:05 PM EST    Workstation ID: [id number]           Lab Results (last 24 hours)     Procedure Component Value Units Date/Time    Comprehensive Metabolic Panel [[id number]]  (Abnormal) Collected: 03/05/23 1156    Specimen: Blood Updated: 03/05/23 1221     Glucose 233 mg/dL      BUN 44 mg/dL      Creatinine 1.89 mg/dL      Sodium 133 mmol/L      Potassium 4.6 mmol/L      Chloride 89 mmol/L      CO2 30.0 mmol/L      Calcium 9.7 mg/dL      Total Protein 8.1 g/dL      Albumin 4.3 g/dL      ALT (SGPT) 7 U/L      AST (SGOT) 15 U/L      Alkaline Phosphatase 172 U/L      Total Bilirubin 0.5 mg/dL      Globulin 3.8 gm/dL      A/G Ratio 1.1 g/dL      BUN/Creatinine Ratio 23.3     Anion Gap 14.0 mmol/L      eGFR 36.3 mL/min/1.73     Narrative:      GFR Normal >60  Chronic Kidney Disease <60  Kidney Failure <15    The GFR formula is only valid for adults with stable renal function between ages 18 and 70.    Lipase [[id number]]  (Abnormal) Collected: 03/05/23 1156    Specimen: Blood Updated: 03/05/23 1221     Lipase 9 U/L     CBC & Differential [[id number]]  (Abnormal) Collected: 03/05/23 1156    Specimen: Blood Updated: 03/05/23 1206    Narrative:      The following orders were created for panel order CBC & Differential.  Procedure                               Abnormality         Status                     ---------                               -----------         ------                     CBC Auto Differential[[id number]]        Abnormal            Final result                  Please view results for these tests on the individual orders.    CBC Auto Differential [[id number]]  (Abnormal) Collected: 03/05/23 1156    Specimen: Blood Updated: 03/05/23 1206     WBC 10.20 10*3/mm3      RBC 5.19 10*6/mm3      Hemoglobin 12.3 g/dL      Hematocrit 39.3 %      MCV 75.8 fL      MCH 23.7 pg      MCHC 31.3 g/dL      RDW 22.4 %      RDW-SD 63.0 fl      MPV 8.1 fL      Platelets 511 10*3/mm3      Neutrophil % 84.1 %      Lymphocyte % 5.9 %      Monocyte % 7.4 %      Eosinophil % 1.9 %      Basophil % 0.7 %      Neutrophils, Absolute 8.60 10*3/mm3      Lymphocytes, Absolute 0.60 10*3/mm3      Monocytes, Absolute 0.80 10*3/mm3      Eosinophils, Absolute 0.20 10*3/mm3      Basophils, Absolute 0.10 10*3/mm3      nRBC 0.0 /100 WBC            I reviewed the patient's new clinical results.    Assessment & Plan       Small bowel obstruction (HCC)  -CT A/P showing signs of SBO, and bibasilar consolidations likely aspiration or pneumonia  -started on IV rocephin  -vomiting controlled at this time  -Keep NPO  -WBC 10.2  -Kidney function at baseline  -pain control    Pneumonia  -started IV rocephin      Diabetes type 2  Stage 3 CKD  COPD  Atrial fibrillation  -on Eliquis  GERD  Pacemaker  Arthritis  CHF      DVT prophylaxis- SCD's  GI prophylaxis- protonix    I discussed the patient's findings and my recommendations with patient.     Uma Neal, APRN  03/05/23  21:40 EST

## 2023-03-06 NOTE — OUTREACH NOTE
Medical Week 3 Survey    Flowsheet Row Responses   [facility] facility patient discharged from? [facility]   Does the patient have one of the following disease processes/diagnoses(primary or secondary)? Other   Week 3 attempt successful? No   Unsuccessful attempts Attempt 1   Revoke Readmitted          Arabella VILLASENOR - Registered Nurse

## 2023-03-06 NOTE — CASE MANAGEMENT/SOCIAL WORK
Social Work Assessment  [facility]     Patient Name: Ro Nathan  MRN: [id number]  Today's Date: 3/6/2023    Admit Date: 3/5/2023     Psychosocial     Row Name 03/06/23 1246       Values/Beliefs    Spiritual, Cultural Beliefs, Zoroastrianism Practices, Values that Affect Care no    Cultural/Zoroastrianism Practices Patient Routinely Participates In ceremony;prayer    Values/Beliefs Comment Uatsdin       Behavior WDL    Behavior WDL WDL       Emotion Mood WDL    Emotion/Mood/Affect WDL WDL       Mental Health    Little Interest or Pleasure in Doing Things 0-->not at all    Feeling Down, Depressed or Hopeless 0-->not at all    Do you feel stress - tense, restless, nervous, or anxious, or unable to sleep at night because your mind is troubled all the time - these days? Not at all       Speech WDL    Speech WDL WDL       Perceptual State WDL    Perceptual State WDL WDL       Thought Process WDL    Thought Process WDL WDL       Intellectual Performance WDL    Intellectual Performance WDL WDL       Coping/Stress    Major Change/Loss/Stressor medical condition/diagnosis;loss of independence    Patient Personal Strengths cinthia/spirituality;expressive of needs;expressive of emotions;able to adapt;assertive;humor;positive attitude;resilient    Sources of Support spouse;Jew/Religion organization;other family members;adult child(elsa);sibling(s)    Techniques to Outing with Loss/Stress/Change spiritual practice(s);diversional activities  Likes to watch TV    Reaction to Health Status adjusting    Understanding of Condition and Treatment adequate understanding of medical condition;adequate understanding of treatment       Developmental Stage (Eriksson's)    Developmental Stage Stage 8 (65 years-death/Late Adulthood) Integrity vs. Despair       C-SSRS (Recent)    Q1 Wished to be Dead (Past Month) no    Q2 Suicidal Thoughts (Past Month) no    Q6 Suicide Behavior (Lifetime) no       Violence Risk    Feels Like Hurting Others no     Previous Attempt to Harm Others no               Abuse/Neglect     Row Name 03/06/23 1253       Personal Safety    Feels Unsafe at Home or Work/School no    Feels Threatened by Someone no    Does Anyone Try to Keep You From Having Contact with Others or Doing Things Outside Your Home? no    Physical Signs of Abuse Present no    Personal Safety Comments Pt denies. He resides at home with his wife.               Legal     Row Name 03/06/23 1254       Financial Resource Strain    How hard is it for you to pay for the very basics like food, housing, medical care, and heating? Not very       Financial/Legal    Source of Income pension/long term;social security    Who Manages Finances if Patient Unable Pt's spouse.    Finance Comments Pt reports meds are a big expense, but says he can afford them. Most are $10 or less, but 1 med is $55. He brings in around $3k/month. No other financial issues. Pt is a retired .       Legal    Criminal Activity/Legal Involvement none               Substance Abuse     Row Name 03/06/23 1300       Substance Use    Substance Use Comment Pt quit smoking and drinking 4 years ago. Pt denies illicit drug use.       AUDIT-C (Alcohol Use Disorders ID Test)    Q1: How often do you have a drink containing alcohol? Never    Q2: How many drinks containing alcohol do you have on a typical day when you are drinking? None    Q3: How often do you have six or more drinks on one occasion? Never    Audit-C Score 0       Family Member Substance Use (#4)    Family History of Substance Use none              SW met with pt in room 4115 to complete LACE. He denies social needs at this time.    Met with patient in room wearing PPE: mask.      Maintained distance greater than six feet and spent less than 15 minutes in the room.      ZACHARY Austin, [facility]  Medical Social Worker  Ph [phone]  Fax [phone]  [email or web address]

## 2023-03-06 NOTE — PLAN OF CARE
Goal Outcome Evaluation:  Plan of Care Reviewed With: patient        Progress: improving     Patient c/o pain to abdomen, prn medication covering, Up in room ambulating, makes needs known

## 2023-03-06 NOTE — PROGRESS NOTES
LOS: 1 day   Patient Care Team:  Yusuf Jones MD as PCP - General  PhiJoshua MD as Consulting Physician (Cardiology)  Moise Watson MD as Consulting Physician (Cardiac Electrophysiology)  Izzy Alvarez MD as Consulting Physician (Nephrology)  Celine Swanson MD as Consulting Physician (Cardiology)    Subjective:  Feels better    Objective:   Less abdominal distention//flatus//no bowel movement yet//afebrile      Review of Systems:   Review of Systems   Constitutional: Positive for activity change.   Respiratory: Positive for shortness of breath.    Cardiovascular: Negative.    Gastrointestinal: Positive for abdominal distention, abdominal pain, constipation and nausea. Negative for diarrhea and rectal pain.   Neurological: Positive for weakness.           Vital Signs  Temp:  [97.5 °F (36.4 °C)-98 °F (36.7 °C)] 97.7 °F (36.5 °C)  Heart Rate:  [67-72] 70  Resp:  [15-18] 16  BP: (109-149)/(52-81) 123/80    Physical Exam:  Physical Exam     Radiology:  CT Abdomen Pelvis Without Contrast    Result Date: 3/5/2023  Impression: 1.Multiple dilated small bowel loops with transition to decompressed more distal small bowel within a large pannus within the lower abdomen, similar to prior exam, and again concerning for small bowel obstruction. 2.Bibasilar consolidations, which could reflect aspiration or pneumonia. Electronically Signed: Rodney Ross  3/5/2023 2:05 PM EST  Workstation ID: [id number]         Results Review:     I reviewed the patient's new clinical results.  I reviewed the patient's new imaging results and agree with the interpretation.    Medication Review:   Scheduled Meds:apixaban, 5 mg, Oral, BID  cefTRIAXone, 1 g, Intravenous, Q24H  guaiFENesin, 600 mg, Oral, Q12H  insulin lispro, 2-7 Units, Subcutaneous, Q6H  ipratropium-albuterol, 3 mL, Nebulization, Q6H While Awake - RT  rOPINIRole, 1 mg, Oral, Daily  sodium chloride, 3 mL, Intravenous, Q12H  sucralfate, 1 g,  Oral, 4x Daily      Continuous Infusions:   PRN Meds:.•  acetaminophen  •  benzonatate  •  dextrose  •  dextrose  •  glucagon (human recombinant)  •  hydrALAZINE  •  HYDROmorphone  •  ondansetron **OR** ondansetron  •  oxyCODONE  •  prochlorperazine  •  [COMPLETED] Insert Peripheral IV **AND** sodium chloride  •  sodium chloride  •  sodium chloride    Labs:    CBC    Results from last 7 days   Lab Units 03/05/23 2256 03/05/23  1156   WBC 10*3/mm3 9.00 10.20   HEMOGLOBIN g/dL 11.6* 12.3*   PLATELETS 10*3/mm3 518* 511*     BMP   Results from last 7 days   Lab Units 03/05/23 2256 03/05/23  1156   SODIUM mmol/L 134* 133*   POTASSIUM mmol/L 4.9 4.6   CHLORIDE mmol/L 89* 89*   CO2 mmol/L 32.0* 30.0*   BUN mg/dL 55* 44*   CREATININE mg/dL 2.50* 1.89*   GLUCOSE mg/dL 205* 233*     Cr Clearance Estimated Creatinine Clearance: 32.5 mL/min (A) (by C-G formula based on SCr of 2.5 mg/dL (H)).  Coag     HbA1C   Lab Results   Component Value Date    HGBA1C 7.3 (H) 10/12/2022    HGBA1C 8.5 (H) 06/02/2022    HGBA1C 8.5 (H) 04/19/2022     Blood Glucose   Glucose   Date/Time Value Ref Range Status   03/06/2023 0517 161 (H) 70 - 105 mg/dL Final     Comment:     Serial Number: [id number]:  [id number]   03/06/2023 0019 191 (H) 70 - 105 mg/dL Final     Comment:     Serial Number: [id number]:  [id number]     Infection     CMP   Results from last 7 days   Lab Units 03/05/23 2256 03/05/23  1156   SODIUM mmol/L 134* 133*   POTASSIUM mmol/L 4.9 4.6   CHLORIDE mmol/L 89* 89*   CO2 mmol/L 32.0* 30.0*   BUN mg/dL 55* 44*   CREATININE mg/dL 2.50* 1.89*   GLUCOSE mg/dL 205* 233*   ALBUMIN g/dL  --  4.3   BILIRUBIN mg/dL  --  0.5   ALK PHOS U/L  --  172*   AST (SGOT) U/L  --  15   ALT (SGPT) U/L  --  7   LIPASE U/L  --  9*     UA      Radiology(recent) CT Abdomen Pelvis Without Contrast    Result Date: 3/5/2023  Impression: 1.Multiple dilated small bowel loops with transition to decompressed more distal small bowel within a large  pannus within the lower abdomen, similar to prior exam, and again concerning for small bowel obstruction. 2.Bibasilar consolidations, which could reflect aspiration or pneumonia. Electronically Signed: Rodney Ross  3/5/2023 2:05 PM EST  Workstation ID: [id number]     Assessment:    Recurrent high-grade small bowel obstruction  Acute aspiration pneumonia  Acute aspiration pneumonitis  MOSD  Inoperable massive ventral herniation  Peptic ulcer disease  History of erosive gastritis  History of grade B esophagitis  Gastroesophageal reflux disease with esophagitis  Herbert's esophageal change  DM II with renal manifestations  DMII with angiopathic manifestations  DMII with neuropathic manifestations  Diabetic dyslipidemia  Panlobular COPD with emphysema  Nicotine dependency with nicotine use disorder, cigarettes  DDD L/S  CKD IIIa  HTN associated with CKD IIIa  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  Cirrhosis  Small vessel PAD secondary to diabetes  Diabetic lower extremity ulcerations  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, MOSD  History of right renal mass  3.4 cm infrarenal abdominal aortic aneurysm  Valvular heart disease with significant mitral regurgitation  Paroxysmal atrial fibrillation  Hypercoagulable state secondary to atrial fibrillation  Chronic oral anticoagulation therapy with direct thrombin inhibition  ACD  Chronic systolic congestive heart failure  Chronic hypoxic respiratory failure  Supplemental oxygen dependency  Chronic mucopurulent bronchitis  History of prostate cancer  Morbid exogenous obesity  Hypoventilation apnea syndrome with ARTI  Peripheral polyneuropathy secondary to alcohol and diabetes  Autonomic neuropathy secondary to diabetes with autonomic dysfunction syndrome  Monoclonal paraproteinemia  Narcotic dependency  Vitamin D deficiency  ETOHISM  H/O pacemaker placement    Plan:  Advance diet//continue antimicrobial therapy        Yusuf Jones MD  03/06/23  08:44  EST

## 2023-03-06 NOTE — CASE MANAGEMENT/SOCIAL WORK
Discharge Planning Assessment   Claus     Patient Name: Ro Nathan  MRN: [id number]  Today's Date: 3/6/2023    Admit Date: 3/5/2023    Plan: DC PLAN: Routine home with [facility] [place] 02 2L      Discharge Needs Assessment     Row Name 03/06/23 1332       Living Environment    People in Home spouse    Name(s) of People in Home Lives with wife and sister    Current Living Arrangements home    Primary Care Provided by self    Provides Primary Care For no one    Able to Return to Prior Arrangements yes       Resource/Environmental Concerns    Resource/Environmental Concerns none       Food Insecurity    Within the past 12 months, you worried that your food would run out before you got the money to buy more. Never true    Within the past 12 months, the food you bought just didn't last and you didn't have money to get more. Never true       Transition Planning    Patient/Family Anticipates Transition to home with family    Patient/Family Anticipated Services at Transition     Transportation Anticipated family or friend will provide       Discharge Needs Assessment    Equipment Currently Used at Home walker, rolling;oxygen;nebulizer               Discharge Plan     Row Name 03/06/23 1265       Plan    Plan DC PLAN: Routine home with [facility] [place] 02 2L    Patient/Family in Agreement with Plan yes    Plan Comments Met with patient at bedside, from routine home with wife and sister. Independent with ADL's uses walker and home oxygen at 2L unsure of the company. PCP is Dr. Jones, Pharmacy verified. Denies any transportation issues, family will provide. Able to afford medications, Glucometer is currently not working, will request DM educator consult. Denies any concerns about returning home.DCP report sent Located within [facility] Readmission questions reviewed.              Continued Care and Services - Admitted Since 3/5/2023     Home Medical Care     Service Provider Request Status Selected Services Address Phone Fax  Patient Preferred    [facility] [facility]ed N/A [address], [address] [address] [phone] --            Selected Continued Care - Prior Encounters Includes continued care and service providers with selected services from prior encounters from 12/5/2022 to 3/6/2023    Discharged on 2/10/2023 Admission date: 2/6/2023 - Discharge disposition: Home or Self Care    Home Medical Care     Service Provider Selected Services Address Phone Fax Patient Preferred    [facility] [address], [address] [facility] [address] [phone] --                Discharged on 12/10/2022 Admission date: 12/7/2022 - Discharge disposition: [place] [facility]     Service Provider Selected Services Address Phone Fax Patient Pref[facility]    [facility] [facility] [address] [facility], [facility] [address] [address] [phone] --                    Expected Discharge Date and Time     Expected Discharge Date Expected Discharge Time    Mar 7, 2023          Demographic Summary     Row Name 03/06/23 1329       General Information    Admission Type inpatient    Arrived From emergency department    Referral Source admission list    Reason for Consult discharge planning    Preferred Language English       Contact Information    Permission Granted to Share Info With     Contact Information Obtained for                Functional Status     Row Name 03/06/23 1332       Functional Status    Usual Activity Tolerance moderate    Current Activity Tolerance moderate       Assessment of Health Literacy    How often do you have someone help you read hospital materials? Sometimes    How often do you have problems learning about your medical condition because of difficulty understanding written information? Sometimes    How often do you have a  problem understanding what is told to you about your medical condition? Sometimes    How confident are you filling out medical forms by yourself? Somewhat    Health Literacy Moderate       Functional Status, IADL    Medications independent    Meal Preparation independent    Housekeeping independent    Laundry independent    Shopping independent       Mental Status    General Appearance WDL WDL               Psychosocial     Row Name 03/06/23 1246       Values/Beliefs    Spiritual, Cultural Beliefs, Nondenominational Practices, Values that Affect Care no    Cultural/Nondenominational Practices Patient Routinely Participates In ceremony;prayer    Values/Beliefs Comment Cheondoism       Behavior WDL    Behavior WDL WDL       Emotion Mood WDL    Emotion/Mood/Affect WDL WDL       Mental Health    Little Interest or Pleasure in Doing Things 0-->not at all    Feeling Down, Depressed or Hopeless 0-->not at all    Do you feel stress - tense, restless, nervous, or anxious, or unable to sleep at night because your mind is troubled all the time - these days? Not at all       Speech WDL    Speech WDL WDL       Perceptual State WDL    Perceptual State WDL WDL       Thought Process WDL    Thought Process WDL WDL       Intellectual Performance WDL    Intellectual Performance WDL WDL       Coping/Stress    Major Change/Loss/Stressor medical condition/diagnosis;loss of independence    Patient Personal Strengths cinthia/spirituality;expressive of needs;expressive of emotions;able to adapt;assertive;humor;positive attitude;resilient    Sources of Support spouse;Adventism/[employer];other family members;adult child(elsa);sibling(s)    Techniques to [place] with Loss/Stress/Change spiritual practice(s);diversional activities  Likes to watch TV    Reaction to Health Status adjusting    Understanding of Condition and Treatment adequate understanding of medical condition;adequate understanding of treatment       Developmental Stage (Eriksson's)     Developmental Stage Stage 8 (65 years-death/Late Adulthood) Integrity vs. Despair       C-SSRS (Recent)    Q1 Wished to be Dead (Past Month) no    Q2 Suicidal Thoughts (Past Month) no    Q6 Suicide Behavior (Lifetime) no       Violence Risk    Feels Like Hurting Others no    Previous Attempt to Harm Others no               Abuse/Neglect     Row Name 03/06/23 1253       Personal Safety    Feels Unsafe at Home or Work/School no    Feels Threatened by Someone no    Does Anyone Try to Keep You From Having Contact with Others or Doing Things Outside Your Home? no    Physical Signs of Abuse Present no    Personal Safety Comments Pt denies. He resides at home with his wife.               Legal     Row Name 03/06/23 1254       Financial Resource Strain    How hard is it for you to pay for the very basics like food, housing, medical care, and heating? Not very       Financial/Legal    Source of Income pension/group home;social security    Who Manages Finances if Patient Unable Pt's spouse.    Finance Comments Pt reports meds are a big expense, but says he can afford them. Most are $10 or less, but 1 med is $55. He brings in around $3k/month. No other financial issues. Pt is a retired .       Legal    Criminal Activity/Legal Involvement none               Substance Abuse     Row Name 03/06/23 1300       Substance Use    Substance Use Comment Pt quit smoking and drinking 4 years ago. Pt denies illicit drug use.       AUDIT-C (Alcohol Use Disorders ID Test)    Q1: How often do you have a drink containing alcohol? Never    Q2: How many drinks containing alcohol do you have on a typical day when you are drinking? None    Q3: How often do you have six or more drinks on one occasion? Never    Audit-C Score 0       Family Member Substance Use (#4)    Family History of Substance Use none               Patient Forms     Row Name 03/06/23 1327       Patient Forms    Important Message from Medicare (IMM) Delivered  IMM  3/5/23 per registration    Delivered to Patient    Method of delivery In person              Case Management Readmission Assessment Note    Case Management Readmission Assessment (all recorded)     Readmission Interview     Row Name 03/06/23 1340             Readmission Indications    Is this hospitalization related to the prior hospital diagnosis? Yes      What was the reason you were admitted? Abdominal pain, Small bowel obstruction      Row Name 03/06/23 1340             Recommendation for rehospitalization    Did you speak with your physician prior to coming to the hospital No      Did you seek care elsewhere prior to coming to the hospital? No      Row Name 03/06/23 1340             Follow up appointment    Do you have a PCP? Yes  Dr. Jones      Did you have an appointment with PCP/specialist after hospitalization within 7 days? No      Did you keep your appointment? Yes      Row Name 03/06/23 1340             Medications    Did you have newly prescribed medications at discharge? Yes      Did you understand the reasons for your medications at discharge and how to take them? Yes      Did you understand the side effects of your medications? Yes      Are you taking all of you prescribed medications? Yes      Row Name 03/06/23 1340             Discharge Instructions    Did you understand your discharge instructions? Yes      Did your family/caregiver hear your instructions? Yes      Were you told to eat a special diet? No      Did you adhere to the diet? No      Were you given a number of someone to call if you had questions or concerns? Yes      Row Name 03/06/23 1340             Index discharge location/services    Where did you go upon discharge? Home with services      Do you have supportive family or friends in the home? Yes      What services were arranged at discharge? Home Health      Row Name 03/06/23 1340             Discharge Readiness    On a scale of 1-5 (5 being well prepared), how ready were you for  discharge 4      Recommendation based on interview Education on diagnosis/self management                2/6- Admitted with small bowel obstruction, multiple reoccurring of acute on chronic bowel obstruction secondary to large hernia (Inopperable) [facility] on 2/10 with [facility].   3/5 Readmitted with abdominal pain, small bowel obstruction, acute aspiration pneumonia. Plan to advance diet and continue IV antibiotics.    Ashley Pratt RN     Office phone: [phone]  Cell phone: [phone]

## 2023-03-06 NOTE — DISCHARGE PLACEMENT REQUEST
"Ro Nathan (77 y.o. Male)     Date of Birth   1945    Social Security Number       Address   [address] [place] [address] [place] [facility]    Home Phone   [phone]    MRN   [id number]       Scientologist   Latter-day    Marital Status                               Admission Date   3/5/23    Admission Type   Emergency    Admitting Provider   Jeane Sanders MD    Attending Provider   Yusuf Jones MD    Department, Room/Bed   [facility] SURGICAL INPATIENT, 4115/1       Discharge Date       Discharge Disposition       Discharge Destination                               Attending Provider: Yusuf Jones MD    Allergies: Haloperidol, Morphine, Pantoprazole, Asa [Aspirin], Latex    Isolation: None   Infection: MRSA No Isolation this Admit (10/13/22)   Code Status: CPR    Ht: 182.9 cm (72\")   Wt: 112 kg (247 lb 12.8 oz)    Admission Cmt: None   Principal Problem: None                Active Insurance as of 3/5/2023     Primary Coverage     Payor Plan Insurance Group Employer/Plan Group    HUMANA MEDICARE REPLACEMENT HUMANA MEDICARE REPLACEMENT [id number]     Payor Plan Address Payor Plan Phone Number Payor Plan Fax Number Effective Dates    [address] [address] [phone]  1/1/2018 - None Entered    [facility] [id number]       Subscriber Name Subscriber Birth Date Member ID       NAHUMRO CAMPOS 1945 [id number]                 Emergency Contacts      (Rel.) Home Phone Work Phone Mobile Phone    NAHUMLONI (Spouse) [phone] -- [phone]    MaryluTierra (Daughter) -- -- [phone]            "

## 2023-03-06 NOTE — PLAN OF CARE
Goal Outcome Evaluation:  Patient voices complaints of pain mainly in back then abdomen, relieved with prn medications, patient coughing and wheezing upon start of shift, noted on ct that possible pneumonia was seen, new orders received for george, chepe, and mucinex, patient on 3L of oxygen which he says he uses at home prn

## 2023-03-07 PROBLEM — K27.9 PEPTIC ULC, SITE UNSP, UNSP AS AC OR CHR, W/O HEMOR OR PERF: Status: ACTIVE | Noted: 2023-01-01

## 2023-03-07 PROBLEM — E11.65 TYPE 2 DIABETES MELLITUS WITH HYPERGLYCEMIA (HCC): Status: ACTIVE | Noted: 2022-01-01

## 2023-03-07 NOTE — DISCHARGE SUMMARY
Date of Discharge:  3/7/2023    Discharge Diagnosis:     Recurrent high-grade small bowel obstruction  Acute aspiration pneumonia  Acute aspiration pneumonitis  MOSD  Inoperable massive ventral herniation  Peptic ulcer disease  History of erosive gastritis  History of grade B esophagitis  Gastroesophageal reflux disease with esophagitis  Herbert's esophageal change  DM II with renal manifestations  DMII with angiopathic manifestations  DMII with neuropathic manifestations  Diabetic dyslipidemia  Panlobular COPD with emphysema  Nicotine dependency with nicotine use disorder, cigarettes  DDD L/S  CKD IIIa  HTN associated with CKD IIIa  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  Cirrhosis  Small vessel PAD secondary to diabetes  Diabetic lower extremity ulcerations  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, MOSD  History of right renal mass  3.4 cm infrarenal abdominal aortic aneurysm  Valvular heart disease with significant mitral regurgitation  Paroxysmal atrial fibrillation  Hypercoagulable state secondary to atrial fibrillation  Chronic oral anticoagulation therapy with direct thrombin inhibition  ACD  Chronic systolic congestive heart failure  Chronic hypoxic respiratory failure  Supplemental oxygen dependency  Chronic mucopurulent bronchitis  History of prostate cancer  Morbid exogenous obesity  Hypoventilation apnea syndrome with ARTI  Peripheral polyneuropathy secondary to alcohol and diabetes  Autonomic neuropathy secondary to diabetes with autonomic dysfunction syndrome  Monoclonal paraproteinemia  Narcotic dependency  Vitamin D deficiency  ETOHISM  H/O pacemaker placement      Presenting Problem/History of Present Illness  Active Hospital Problems    Diagnosis  POA   • **Small bowel obstruction (HCC) [K56.609]  Yes   • Hyponatremia [E87.1]  Yes   • Nausea and vomiting [R11.2]  Yes   • Pneumonia [J18.9]  Yes   • Chronic renal insufficiency [N18.9]  Yes   • Long term current use of anticoagulants  with INR goal of 2.0-3.0 [Z79.01]  Not Applicable      Resolved Hospital Problems   No resolved problems to display.          Hospital Course  Patient is a 77 y.o. male who presented with acute SBO.  With bowel rest he improved and was found to have spontaneous resolution.  He improved and was sent home with ABX secondary to an acute aspiratiopn pneumonitis.      Procedures Performed         Consults:   Consults     Date and Time Order Name Status Description    3/5/2023  2:09 PM Family Medicine Consult            Pertinent Test Results:CT Abdomen Pelvis Without Contrast    Result Date: 3/5/2023  Impression: 1.Multiple dilated small bowel loops with transition to decompressed more distal small bowel within a large pannus within the lower abdomen, similar to prior exam, and again concerning for small bowel obstruction. 2.Bibasilar consolidations, which could reflect aspiration or pneumonia. Electronically Signed: Rodney Ross  3/5/2023 2:05 PM EST  Workstation ID: [id number]      Imaging Results (Last 7 Days)     Procedure Component Value Units Date/Time    CT Abdomen Pelvis Without Contrast [[id number]] Collected: 03/05/23 1359     Updated: 03/05/23 1408    Narrative:      CT ABDOMEN PELVIS WO CONTRAST    Date of Exam: 3/5/2023 1:41 PM EST    Indication: abdominal pain and distention.    Comparison: February 6, 2023    Technique: Axial CT images were obtained of the abdomen and pelvis without the administration of contrast. Sagittal and coronal reconstructions were performed.  Automated exposure control and iterative reconstruction methods were used.     Findings:  Within the lung bases are bibasilar consolidations.    The unenhanced liver, adrenal glands, spleen, and pancreas are unremarkable. The gallbladder is surgically absent. There is a small right renal cyst. There is a staghorn left renal calculus.    The stomach is distended. There are multiple dilated small bowel loops similar to prior exam with transition  point into decompressed more distal small bowel within a large pannus within the lower abdomen. The colon appears normal. The appendix is not   well-visualized. There is no ascites or loculated collection. No abnormally enlarged lymph nodes are identified.    The rectum and urinary bladder are unremarkable. Brachytherapy seed implants are present in the prostate.    No aggressive osseous lesions are identified.      Impression:      Impression:  1.Multiple dilated small bowel loops with transition to decompressed more distal small bowel within a large pannus within the lower abdomen, similar to prior exam, and again concerning for small bowel obstruction.  2.Bibasilar consolidations, which could reflect aspiration or pneumonia.      Electronically Signed: Rodney Ross    3/5/2023 2:05 PM EST    Workstation ID: [id number]              Condition on Discharge:  Fair    Vital Signs  Temp:  [96.3 °F (35.7 °C)-98.6 °F (37 °C)] 98.6 °F (37 °C)  Heart Rate:  [68-78] 68  Resp:  [15-22] 20  BP: (102-112)/(65-76) 112/76    Physical Exam:     General Appearance:    Alert, cooperative, in no acute distress   Head:    Normocephalic, without obvious abnormality, atraumatic   Eyes:           Conjunctivae and sclerae normal, no   icterus, no pallor, corneas clear, PERRLA   Throat:   No oral lesions, no thrush, oral mucosa moist   Neck:   No adenopathy, supple, trachea midline, no thyromegaly, no   carotid bruit, no JVD   Lungs:     Clear to auscultation,respirations regular, even and                  unlabored    Heart:    Regular rhythm and normal rate, normal S1 and S2, no            murmur, no gallop, no rub, no click   Chest Wall:    No abnormalities observed   Abdomen:     Normal bowel sounds, no masses, no organomegaly, soft        non-tender, non-distended, no guarding, no rebound                tenderness   Rectal:     Deferred   Extremities:   Moves all extremities well, no edema, no cyanosis, no             redness    Pulses:   Pulses palpable and equal bilaterally   Skin:   No bleeding, bruising or rash   Lymph nodes:   No palpable adenopathy   Neurologic:   Cranial nerves 2 - 12 grossly intact, sensation intact, DTR       present and equal bilaterally         Discharge Disposition  Home or Self Care    Discharge Medications     Discharge Medications      New Medications      Instructions Start Date   cefuroxime 500 MG tablet  Commonly known as: CEFTIN   500 mg, Oral, 2 Times Daily         Continue These Medications      Instructions Start Date   atorvastatin 40 MG tablet  Commonly known as: LIPITOR   40 mg, Oral, Every Morning      docusate sodium 100 MG capsule  Commonly known as: COLACE   200 mg, Oral, 2 Times Daily      Eliquis 5 MG tablet tablet  Generic drug: apixaban   TAKE ONE TABLET BY MOUTH TWICE A DAY      escitalopram 10 MG tablet  Commonly known as: LEXAPRO   10 mg, Oral, Daily      esomeprazole 40 MG capsule  Commonly known as: nexIUM   40 mg, Oral, Daily      furosemide 40 MG tablet  Commonly known as: LASIX   40 mg, Oral, Every Morning      glimepiride 2 MG tablet  Commonly known as: AMARYL   2 mg, Oral, Daily      ipratropium-albuterol 0.5-2.5 mg/3 ml nebulizer  Commonly known as: DUO-NEB   3 mL, Nebulization, 4 Times Daily PRN      isosorbide mononitrate 120 MG 24 hr tablet  Commonly known as: IMDUR   120 mg, Oral, Every 24 Hours Scheduled      metOLazone 5 MG tablet  Commonly known as: ZAROXOLYN   5 mg, Oral, 3 Times Weekly, Monday, Wednesday, Friday    As needed for weight gain.      metoprolol tartrate 50 MG tablet  Commonly known as: LOPRESSOR   50 mg, Oral, 2 Times Daily With Meals      mirtazapine 30 MG tablet  Commonly known as: REMERON   30 mg, Oral, Nightly      nitroglycerin 0.4 MG SL tablet  Commonly known as: NITROSTAT   0.4 mg, Sublingual, Every 5 Minutes PRN      oxyCODONE-acetaminophen  MG per tablet  Commonly known as: PERCOCET   1 tablet, Oral, Every 6 Hours PRN      polyethylene glycol  17 g packet  Commonly known as: MIRALAX   17 g, Oral, Daily      pregabalin 150 MG capsule  Commonly known as: LYRICA   150 mg, Oral, 2 Times Daily      QUEtiapine 50 MG tablet  Commonly known as: SEROquel   50 mg, Oral, Nightly      ranolazine 1000 MG 12 hr tablet  Commonly known as: RANEXA   1,000 mg, Oral, Every 12 Hours Scheduled      rOPINIRole 1 MG tablet  Commonly known as: REQUIP   1 mg, Oral, Daily      sucralfate 1 g tablet  Commonly known as: CARAFATE   1 g, Oral, 4 Times Daily      Ventolin  (90 Base) MCG/ACT inhaler  Generic drug: albuterol sulfate HFA   2 puffs, Inhalation, Every 4 Hours PRN, May use every 4 to 6 hours         Stop These Medications    benzonatate 100 MG capsule  Commonly known as: TESSALON            Discharge Diet:   ADA cardiac 1800 sayda  Activity at Discharge:   As tolerated  Follow-up Appointments  Us in 2 weeks  Future Appointments   Date Time Provider Department Center   3/16/2023 11:00 AM [facility] DEVICE [facility]   3/16/2023 11:30 AM Celine Swanson MD [facility]   3/28/2023  1:50 PM Herberth Oconnor MD [facility]         Test Results Pending at Discharge       Yusuf Jones MD  03/07/23  12:52 EST

## 2023-03-07 NOTE — PLAN OF CARE
Goal Outcome Evaluation:  Plan of Care Reviewed With: patient        Progress: declining    Mild c/o pain this shift. IV antibiotics. Patient has big BM beginning of shift. SOA reported. Hx of copd. Got patient up in chair, breathing was controlled. 5L HFNC. Able to make needs known. Education on deep breathing,turn and coughing complete. Up ad gavin. Urinal within reach. Call light within reach.  Plan of care ongoing.

## 2023-03-07 NOTE — PLAN OF CARE
Goal Outcome Evaluation:  Plan of Care Reviewed With: patient        Progress: improving  Outcome Evaluation: Patient has had bowel movements, VSS, 4 liters NC, plan to d/c home

## 2023-03-08 NOTE — OUTREACH NOTE
Prep Survey    Flowsheet Row Responses   [facility]   Is LACE score < 7 ? No   Eligibility Readm Mgmt   Discharge diagnosis Small bowel obstruction   Does the patient have one of the following disease processes/diagnoses(primary or secondary)? Other   Does the patient have Home health ordered? Yes   What is the Home health agency?  [facility]   Is there a DME ordered? No   Prep survey completed? Yes          Suzi VILLASENOR - Registered Nurse

## 2023-03-08 NOTE — CASE MANAGEMENT/SOCIAL WORK
Case Management Discharge Note      Final Note: Home with [facility]         Selected Continued Care - Discharged on 3/7/2023 Admission date: 3/5/2023 - Discharge disposition: Home or Self Care        Home Medical Care Coordination complete.    Service Provider Selected Services Address Phone Fax Patient Preferred    [facility] [address] [place] [address], [facility] [[email or web address]] [address] [id number] [phone] --                        Transportation Services  Private: Car    Final Discharge Disposition Code: 06 - home with home health care

## 2023-03-13 NOTE — ED PROVIDER NOTES
Subjective   History of Present Illness  77-year-old male transferred from home status post witnessed arrest.  BLS medic intubated with EyeGel, CPR, 4 shocks per AED.  Patient had 15 minutes of CPR prior to arrival.  Patient was orally intubated, CPR was continued, ACLS followed.  Patient stayed in ventricular fibrillation throughout the attempted resuscitation.  Blood sugar was normal.  After numerous shocks and medicines and over 15 minutes of CPR totaling over 30 minutes of CPR without any return of circulation with the exception of several seconds of a faint pulse per EMS en route, resuscitative efforts were ceased.        Review of Systems   Unable to perform ROS: Patient unresponsive       Past Medical History:   Diagnosis Date   • Alcoholic cirrhosis of liver with ascites ([facility]) 10/26/2020   • Benign hypertension with CKD (chronic kidney disease) stage III ([facility]) 10/26/2020   • Bruises easily    • CHF (congestive heart failure) ([facility])    • Chronic bronchitis with COPD (chronic obstructive pulmonary disease) ([facility]) 10/26/2020   • Chronic respiratory failure with hypoxia ([facility]) 10/26/2020   • Congenital heart defect    • Difficulty attaining erection    • Heart block    • Hernia of abdominal wall    • Hypertension    • Low back pain    • Pacemaker    • Peripheral vascular disease due to secondary diabetes ([facility]) 10/26/2020   • Poor circulation    • Prostate cancer ([facility])     prostate   • Shortness of breath    • Sleep apnea     using CPAP    • Stage 3a chronic kidney disease ([facility]) 10/26/2020   • Stented coronary artery 12/05/2019    MICHAEL to LAD   • Type 2 diabetes, controlled, with neuropathy ([facility]) 10/26/2020    no meds   • Type 2 diabetes, controlled, with neuropathy ([facility]) 10/26/2020       Allergies   Allergen Reactions   • Haloperidol Hives   • Morphine Itching   • Pantoprazole Other (See Comments)     Feels sick after receiving   • Asa [Aspirin] Hives   • Latex Unknown - High Severity       Past Surgical History:    Procedure Laterality Date   • ABDOMINAL SURGERY     • APPENDECTOMY     • BONE CURETTAGE Right 1/22/2021    Procedure: Wound excision with fifth metatarsal head resection, right foot.;  Surgeon: Josef Egan DPM;  Location: [facility] MAIN OR;  Service: Podiatry;  Laterality: Right;   • BONE INCISION AND DRAINAGE Right 10/5/2020    Procedure: Incision and drainage with debridement of all nonviable soft tissue and bone with bone biopsy, right foot.;  Surgeon: Josef Egan DPM;  Location: [facility] MAIN OR;  Service: Podiatry;  Laterality: Right;   • BRONCHOSCOPY N/A 4/23/2022    Procedure: BRONCHOSCOPY with bronchioalveolar lavage of right lower lobe;  Surgeon: Edwin Kline MD;  Location: [facility] ENDOSCOPY;  Service: Pulmonary;  Laterality: N/A;  pneumonia   • CARDIAC CATHETERIZATION N/A 12/5/2019    Procedure: Left Heart Cath;  Surgeon: Mirza Munson MD;  Location: [facility] CATH INVASIVE LOCATION;  Service: Cardiology   • CARDIAC CATHETERIZATION N/A 12/5/2019    Procedure: Stent MICHAEL coronary;  Surgeon: Mirza Munson MD;  Location: [facility] CATH INVASIVE LOCATION;  Service: Cardiology   • CARDIAC CATHETERIZATION N/A 10/18/2021    Procedure: Left Heart Cath and coronary angiogram;  Surgeon: Celine Swanson MD;  Location: [facility] CATH INVASIVE LOCATION;  Service: Cardiovascular;  Laterality: N/A;   • CARDIAC CATHETERIZATION N/A 10/18/2021    Procedure: Right Heart Cath;  Surgeon: Celine Swansno MD;  Location: [facility] CATH INVASIVE LOCATION;  Service: Cardiovascular;  Laterality: N/A;   • CHOLECYSTECTOMY     • ELBOW PROCEDURE      left   • ENDOSCOPY N/A 7/6/2022    Procedure: ESOPHAGOGASTRODUODENOSCOPY with biopsy x 1 area. bicap cautery, scelero therapy.;  Surgeon: Douglas Delgadillo MD;  Location: [facility] ENDOSCOPY;  Service: Gastroenterology;  Laterality: N/A;  ESOPHAGITIS, ANTREL ULCER, DUODENAL ULCER   • ENDOSCOPY N/A 11/27/2022    Procedure: ESOPHAGOGASTRODUODENOSCOPY with argon plasma  coagulation of small bowel arteriovenous malformation;  Surgeon: Douglas Delgadillo MD;  Location: [facility] ENDOSCOPY;  Service: Gastroenterology;  Laterality: N/A;  post: small bowel AVM   • FOOT SURGERY      right foot   • HARDWARE REMOVAL Left 10/18/2022    Procedure: ELBOW HARDWARE REMOVAL;  Surgeon: Brad Jackson MD;  Location: [facility] MAIN OR;  Service: Orthopedics;  Laterality: Left;   • HERNIA REPAIR     • INCISION AND DRAINAGE OF WOUND Right 4/6/2021    Procedure: INCISION AND DRAINAGE OF RIGHT FOOT 5TH METATARSAL TO BONE AND PARTIAL 5TH METATARSAL RESECTION;  Surgeon: Josef Egan DPM;  Location: [facility] MAIN OR;  Service: Podiatry;  Laterality: Right;   • INCISION AND DRAINAGE OF WOUND Right 4/8/2021    Procedure: INCISION AND DRAINAGE BONE, DELAYED PRIMARY CLOSURE;  Surgeon: Josef Egan DPM;  Location: [facility] MAIN OR;  Service: Podiatry;  Laterality: Right;   • INTERVENTIONAL RADIOLOGY PROCEDURE N/A 12/5/2019    Procedure: Intravascular Ultrasound;  Surgeon: Mirza Munson MD;  Location: [facility] CATH INVASIVE LOCATION;  Service: Cardiology   • PACEMAKER IMPLANTATION     • MN RT/LT HEART CATHETERS N/A 12/5/2019    Procedure: Percutaneous Coronary Intervention;  Surgeon: Mirza Munson MD;  Location: [facility] CATH INVASIVE LOCATION;  Service: Cardiology   • WOUND DEBRIDEMENT Right 10/29/2020    Procedure: Preparation and debridement of foot wound with application of Integra wound graft, right foot.;  Surgeon: Josef Egan DPM;  Location: [facility] MAIN OR;  Service: Podiatry;  Laterality: Right;   • WOUND DEBRIDEMENT N/A 7/29/2021    Procedure: EXCISIONAL ABDOMINAL WOUND  DEBRIDEMENT;  Surgeon: Cleopatra Wang MD;  Location: [facility] MAIN OR;  Service: General;  Laterality: N/A;   • WOUND DEBRIDEMENT N/A 8/1/2021    Procedure: DEBRIDEMENT OF ABDOMINAL WALL;  Surgeon: Hill Bhatia DO;  Location: [facility] MAIN OR;  Service: General;  Laterality: N/A;       Family History    Problem Relation Age of Onset   • Alzheimer's disease Mother    • GI problems Father    • Heart disease Father        Social History     Socioeconomic History   • Marital status:    Tobacco Use   • Smoking status: Former     Years: 15.00     Types: Cigarettes     Start date: 1966     Quit date: 5/10/2020     Years since quittin.8   • Smokeless tobacco: Never   Vaping Use   • Vaping Use: Never used   Substance and Sexual Activity   • Alcohol use: Not Currently   • Drug use: No   • Sexual activity: Defer           Objective   Physical Exam  Constitutional:       Comments: Unresponsive 77-year-old male   HENT:      Head: Normocephalic and atraumatic.      Mouth/Throat:      Pharynx: Oropharynx is clear.   Cardiovascular:      Comments: Good femoral pulses with CPR, no spontaneous pulse over  Pulmonary:      Comments: No spontaneous respirations, clear via bag mask ventilation bilaterally  Abdominal:      Comments: Ventral hernias noted   Neurological:      Comments: GCS 3 T         Intubation    Date/Time: 3/13/2023 1:33 AM  Performed by: Hill Whitfield MD  Authorized by: Hill Whitfield MD     Consent:     Consent obtained:  Emergent situation  Pre-procedure details:     Indication: failure to oxygenate and failure to ventilate      Patient status:  Unresponsive  Procedure details:     Preoxygenation:  Bag valve mask    CPR in progress: yes      Intubation method:  Oral    Intubation technique: video assisted      Laryngoscope blade:  Mac 4    Tube type:  Cuffed    Number of attempts:  1    Tube visualized through cords: yes    Placement assessment:     ETT at teeth/gumline (cm):  22    Tube secured with:  ETT nicole    Breath sounds:  Equal    Placement verification: chest rise, colorimetric ETCO2, direct visualization and tube exhalation                 ED Course                                           Medical Decision Making  Case discussed with wife who states patient was in his recliner and  had yelled out for her.  Wife came into the room and found him unresponsive.  Patient was just discharged from the hospital on 2023 for small bowel obstruction.    Ventricular fibrillation (HCC): complicated acute illness or injury  Amount and/or Complexity of Data Reviewed  Independent Historian: spouse and EMS     Details: As per note  External Data Reviewed: notes.     Details: As above      Risk  Prescription drug management.          Final diagnoses:   Ventricular fibrillation (HCC)       ED Disposition  ED Disposition     ED Disposition       Condition   --    Comment   --             No follow-up provider specified.       Medication List      No changes were made to your prescriptions during this visit.          Hill Whitfield MD  [phone]

## 2023-03-13 NOTE — OUTREACH NOTE
Medical Week 1 Survey    Flowsheet Row Responses   [facility] [place] patient discharged from? [facility]   Does the patient have one of the following disease processes/diagnoses(primary or secondary)? Other   Week 1 attempt successful? No   Unsuccessful attempts Attempt 1   Revoke Patient           Jayne VILLASENOR - Registered Nurse

## 2023-05-26 NOTE — PROGRESS NOTES
Stable for discharge.  Follow-up with Dr. Watson in 2-3 weeks   Hydroxyzine Pregnancy And Lactation Text: This medication is not safe during pregnancy and should not be taken. It is also excreted in breast milk and breast feeding isn't recommended.

## 2023-05-26 NOTE — PLAN OF CARE
Goal Outcome Evaluation:              Outcome Evaluation: Patient currently resting abed, no distress noted. Patient is currently on 3L oxygen via nasal canula. He has an order for a CiPap but stated he didn't want to wear it. Patient has voiced complaints of pain but states he will not take anything oral for it. I again spoke with patient regarding pain management at home and again he refused oral pain medication. Will continue to monitor.   [Bilateral] : knee bilaterally [NL (0)] : extension 0 degrees [] : no atrophy [[id number]] : flexion 130 degrees

## 2023-06-13 NOTE — THERAPY EVALUATION
Patient Name: Ro Nathan  : 1945    MRN: [id number]                              Today's Date: 2022       Admit Date: 2022    Visit Dx:     ICD-10-CM ICD-9-CM   1. Wound infection  T14.8XXA 958.3    L08.9    2. Elbow pain, left  M25.522 719.42   3. Olecranon bursitis of left elbow  M70.22 726.33     Patient Active Problem List   Diagnosis   • Skin ulcer (HCC)   • Chronic low back pain   • DDD (degenerative disc disease), lumbar   • Spondylosis of lumbosacral region   • Atrial fibrillation (HCC)   • Chronic obstructive pulmonary disease (HCC)   • Atherosclerosis of native coronary artery of native heart with angina pectoris (HCC)   • Gastroesophageal reflux disease   • Lumbar radiculopathy   • Other hammer toe(s) (acquired), right foot   • Presence of cardiac pacemaker   • Status post coronary artery stent placement   • COPD with exacerbation (HCC)   • Stented coronary artery   • Small bowel obstruction due to adhesions (HCC)   • Elevated lipoprotein(a)   • Acute generalized abdominal pain   • Diabetic foot ulcer (HCC)   • Ventral hernia with obstruction but no gangrene   • DM type 2 with diabetic dyslipidemia (HCC)   • Peripheral vascular disease due to secondary diabetes (HCC)   • Benign hypertension with CKD (chronic kidney disease) stage III (HCC)   • Stage 3a chronic kidney disease (HCC)   • Type 2 diabetes, controlled, with neuropathy (HCC)   • Chronic bronchitis with COPD (chronic obstructive pulmonary disease) (HCC)   • 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)   • Chronic respiratory failure with hypoxia (HCC)   • Diabetic ulcer of right midfoot associated with type 2 diabetes mellitus (HCC)   • Dyspnea on exertion   • 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)   • Ischemic ulcer of foot with fat layer exposed, right (HCC)   • Right foot ulcer (HCC)   • Arthritis   • CHF due to valvular disease (HCC)   • Hiatal hernia   • Presence of coronary angioplasty implant  and graft   • CAD (coronary artery disease)   • Chronic obstructive bronchitis (HCC)   • Degeneration of lumbar intervertebral disc   • Ischemic ulcer of foot (HCC)   • Diabetes mellitus with coincident hypertension (HCC)   • Benign hypertension with chronic kidney disease   • Acquired hallux malleus   • Peripheral vascular disease due to secondary diabetes mellitus (HCC)   • Obstruction of small intestine due to peritoneal adhesion (HCC)   • Lumbosacral spondylosis   • Obstructed ventral hernia   • Foot ulceration, right, with necrosis of bone (HCC)   • Ulcer of right foot with bone involvement without evidence of necrosis (HCC)   • Diarrhea   • Abdominal bloating   • Acute abdominal pain   • AV block, complete (HCC)   • Sepsis, unspecified   • Cellulitis of abdominal wall   • Angina of effort (HCC)   • Chronic renal insufficiency   • Long term current use of anticoagulants with INR goal of 2.0-3.0   • Alcoholic cirrhosis of liver without ascites (HCC)   • Pneumonia due to infectious organism, unspecified laterality, unspecified part of lung   • Non-healing surgical wound   • Long term (current) use of inhaled steroids   • Personal history of nicotine dependence   • Chronic systolic heart failure (CMS/HCC)   • Dyspnea, unspecified type   • Pneumonia of right lung due to infectious organism, unspecified part of lung   • (HFpEF) heart failure with preserved ejection fraction (HCC)   • Chronic low back pain   • Steroid-induced hyperglycemia   • Positive blood culture   • Acute systolic heart failure (CMS/HCC)   • Unstable angina (HCC)   • Upper GI bleed   • Acute on chronic systolic heart failure (CMS/HCC)   • Dependence on supplemental oxygen   • SBO (small bowel obstruction) (HCC)   • Wound infection   • Elbow pain, left   • Olecranon bursitis of left elbow     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) 10/26/2020   • Benign hypertension with CKD (chronic kidney  disease) stage III ([facility]) 10/26/2020   • Bruises easily    • CHF (congestive heart failure) ([facility])    • Chronic bronchitis with COPD (chronic obstructive pulmonary disease) ([facility]) 10/26/2020   • Chronic respiratory failure with hypoxia ([facility]) 10/26/2020   • Congenital heart defect    • Difficulty attaining erection    • Heart block    • Hernia of abdominal wall    • Hypertension    • Low back pain    • Pacemaker    • Peripheral vascular disease due to secondary diabetes ([facility]) 10/26/2020   • Poor circulation    • Prostate cancer ([facility])     prostate   • Shortness of breath    • Sleep apnea     using CPAP    • Stage 3a chronic kidney disease ([facility]) 10/26/2020   • Stented coronary artery 12/05/2019    MICHAEL to LAD   • Type 2 diabetes, controlled, with neuropathy ([facility]) 10/26/2020    no meds   • Type 2 diabetes, controlled, with neuropathy ([facility]) 10/26/2020     Past Surgical History:   Procedure Laterality Date   • ABDOMINAL SURGERY     • APPENDECTOMY     • BONE CURETTAGE Right 1/22/2021    Procedure: Wound excision with fifth metatarsal head resection, right foot.;  Surgeon: Josef Egan DPM;  Location: [facility] MAIN OR;  Service: Podiatry;  Laterality: Right;   • BONE INCISION AND DRAINAGE Right 10/5/2020    Procedure: Incision and drainage with debridement of all nonviable soft tissue and bone with bone biopsy, right foot.;  Surgeon: Josef Egan DPM;  Location: [facility] MAIN OR;  Service: Podiatry;  Laterality: Right;   • BRONCHOSCOPY N/A 4/23/2022    Procedure: BRONCHOSCOPY with bronchioalveolar lavage of right lower lobe;  Surgeon: Edwin Kline MD;  Location: [facility] ENDOSCOPY;  Service: Pulmonary;  Laterality: N/A;  pneumonia   • CARDIAC CATHETERIZATION N/A 12/5/2019    Procedure: Left Heart Cath;  Surgeon: Mirza Munson MD;  Location: [facility] CATH INVASIVE LOCATION;  Service: Cardiology   • CARDIAC CATHETERIZATION N/A 12/5/2019    Procedure: Stent MICHAEL coronary;  Surgeon: Mirza Munson MD;   Location: [facility] CATH INVASIVE LOCATION;  Service: Cardiology   • CARDIAC CATHETERIZATION N/A 10/18/2021    Procedure: Left Heart Cath and coronary angiogram;  Surgeon: Celine Swanson MD;  Location: [facility] CATH INVASIVE LOCATION;  Service: Cardiovascular;  Laterality: N/A;   • CARDIAC CATHETERIZATION N/A 10/18/2021    Procedure: Right Heart Cath;  Surgeon: Celine Swanson MD;  Location: [facility] CATH INVASIVE LOCATION;  Service: Cardiovascular;  Laterality: N/A;   • CHOLECYSTECTOMY     • ELBOW PROCEDURE      left   • ENDOSCOPY N/A 7/6/2022    Procedure: ESOPHAGOGASTRODUODENOSCOPY with biopsy x 1 area. bicap cautery, scelero therapy.;  Surgeon: Douglas Delgadillo MD;  Location: [facility] ENDOSCOPY;  Service: Gastroenterology;  Laterality: N/A;  ESOPHAGITIS, ANTREL ULCER, DUODENAL ULCER   • FOOT SURGERY      right foot   • HERNIA REPAIR     • INCISION AND DRAINAGE OF WOUND Right 4/6/2021    Procedure: INCISION AND DRAINAGE OF RIGHT FOOT 5TH METATARSAL TO BONE AND PARTIAL 5TH METATARSAL RESECTION;  Surgeon: Josef Egan DPM;  Location: [facility] MAIN OR;  Service: Podiatry;  Laterality: Right;   • INCISION AND DRAINAGE OF WOUND Right 4/8/2021    Procedure: INCISION AND DRAINAGE BONE, DELAYED PRIMARY CLOSURE;  Surgeon: Josef Egan DPM;  Location: [facility] MAIN OR;  Service: Podiatry;  Laterality: Right;   • INTERVENTIONAL RADIOLOGY PROCEDURE N/A 12/5/2019    Procedure: Intravascular Ultrasound;  Surgeon: Mirza Munson MD;  Location: [facility] CATH INVASIVE LOCATION;  Service: Cardiology   • PACEMAKER IMPLANTATION     • W[place] RT/LT HEART CATHETERS N/A 12/5/2019    Procedure: Percutaneous Coronary Intervention;  Surgeon: Mirza Munson MD;  Location: [facility] CATH INVASIVE LOCATION;  Service: Cardiology   • WOUND DEBRIDEMENT Right 10/29/2020    Procedure: Preparation and debridement of foot wound with application of Integra wound graft, right foot.;  Surgeon: Josef Egan DPM;  Location:   [facility] MAIN OR;  Service: Podiatry;  Laterality: Right;   • WOUND DEBRIDEMENT N/A 7/29/2021    Procedure: EXCISIONAL ABDOMINAL WOUND  DEBRIDEMENT;  Surgeon: Cleopatra Wang MD;  Location: [facility] MAIN OR;  Service: General;  Laterality: N/A;   • WOUND DEBRIDEMENT N/A 8/1/2021    Procedure: DEBRIDEMENT OF ABDOMINAL WALL;  Surgeon: Hill Bhatia DO;  Location: [facility] MAIN OR;  Service: General;  Laterality: N/A;      General Information     Row Name 09/28/22 1501          Physical Therapy Time and Intention    Document Type evaluation  -CL     Mode of Treatment individual therapy;physical therapy  -CL     Row Name 09/28/22 1501          General Information    Patient Profile Reviewed yes  -CL     Prior Level of Function independent:;all household mobility;gait;transfer  -CL     Existing Precautions/Restrictions fall  -CL     Barriers to Rehab medically complex  -CL     Row Name 09/28/22 1501          Living Environment    People in Home spouse  -CL     Row Name 09/28/22 1501          Home Main Entrance    Number of Stairs, Main Entrance one  -CL     Row Name 09/28/22 1501          Stairs Within Home, Primary    Number of Stairs, Within Home, Primary none  -CL     Row Name 09/28/22 1501          Cognition    Orientation Status (Cognition) oriented x 4  -CL     Row Name 09/28/22 1501          Safety Issues, Functional Mobility    Impairments Affecting Function (Mobility) endurance/activity tolerance;pain  -CL           User Key  (r) = Recorded By, (t) = Taken By, (c) = Cosigned By    Initials Name Provider Type    CL Ira Wagner PT Physical Therapist               Mobility     Row Name 09/28/22 1502          Bed Mobility    Bed Mobility bed mobility (all) activities;supine-sit;sit-supine  -CL     Supine-Sit [place] (Bed Mobility) minimum assist (75% patient effort)  -CL     Sit-Supine [place] (Bed Mobility) modified independence  -CL     Comment, (Bed Mobility) Limited by LUE/L elbow pain limiting use for  MEDICATIONS  (STANDING):  ampicillin/sulbactam IV Intermittent - Peds 650 milliGRAM(s) IV Intermittent every 6 hours  sirolimus Oral Liquid - Peds 0.7 milliGRAM(s) Oral every 12 hours  trimethoprim  /sulfamethoxazole Oral Liquid - Peds 37 milliGRAM(s) Oral <User Schedule>    MEDICATIONS  (PRN):  acetaminophen   Oral Liquid - Peds. 160 milliGRAM(s) Oral every 6 hours PRN Mild Pain (1 - 3) transfer  -CL     Row Name 09/28/22 1502          Sit-Stand Transfer    Sit-Stand [place] (Transfers) contact guard  -CL     Row Name 09/28/22 1502          Gait/Stairs (Locomotion)    [place] Level (Gait) contact guard;supervision  -CL     Distance in Feet (Gait) 60' including 3 turns with no LOB  -CL           User Key  (r) = Recorded By, (t) = Taken By, (c) = Cosigned By    Initials Name Provider Type    Ira Mahmood, PT Physical Therapist               Obj/Interventions     Row Name 09/28/22 1504          Range of Motion Comprehensive    General Range of Motion bilateral lower extremity ROM WFL  -CL     Comment, General Range of Motion BLEs WFL, L elbow limited extension -20 degrees due to pain; flexion WFL; shoulder and wrist WFL  -CL     Row Name 09/28/22 1504          Strength Comprehensive (MMT)    Comment, General Manual Muscle Testing (MMT) Assessment BLEs grossly 4+/5  -CL     Row Name 09/28/22 1504          Balance    Balance Assessment sitting dynamic balance  -CL     Dynamic Sitting Balance independent  -CL     Position, Sitting Balance sitting edge of bed  -CL           User Key  (r) = Recorded By, (t) = Taken By, (c) = Cosigned By    Initials Name Provider Type    Ira Mahmood, PT Physical Therapist               Goals/Plan     Row Name 09/28/22 1606          Bed Mobility Goal 1 (PT)    Activity/Assistive Device (Bed Mobility Goal 1, PT) bed mobility activities, all  -CL     [place] Level/Cues Needed (Bed Mobility Goal 1, PT) independent  -CL     Time Frame (Bed Mobility Goal 1, PT) long term goal (LTG);2 weeks  -CL     Row Name 09/28/22 1606          Transfer Goal 1 (PT)    Activity/Assistive Device (Transfer Goal 1, PT) transfers, all  -CL     [place] Level/Cues Needed (Transfer Goal 1, PT) independent  -CL     Time Frame (Transfer Goal 1, PT) long term goal (LTG);2 weeks  -CL     Row Name 09/28/22 1606          Gait Training Goal 1 (PT)    Activity/Assistive  Device (Gait Training Goal 1, PT) gait (walking locomotion);assistive device use  -CL     [place] Level (Gait Training Goal 1, PT) modified independence  -CL     Distance (Gait Training Goal 1, PT) 75'  -CL     Time Frame (Gait Training Goal 1, PT) long term goal (LTG);2 weeks  -CL     Row Name 09/28/22 1606          ROM Goal 1 (PT)    ROM Goal 1 (PT) increase L elbow extension to -10  -CL     Time Frame (ROM Goal 1, PT) long-term goal (LTG);2 weeks  -CL     Row Name 09/28/22 1606          Therapy Assessment/Plan (PT)    Planned Therapy Interventions (PT) bed mobility training;gait training;balance training;patient/family education;ROM (range of motion);strengthening;transfer training  -CL           User Key  (r) = Recorded By, (t) = Taken By, (c) = Cosigned By    Initials Name Provider Type    CL Ira Wagner, PT Physical Therapist               Clinical Impression     Row Name 09/28/22 1507          Pain    Pretreatment Pain Rating 10/10  -CL     Posttreatment Pain Rating 10/10  -CL     Pain Location - Side/Orientation Left  -CL     Pain Location - elbow  -CL     Pre/Posttreatment Pain Comment Pt rates pain 10/10 but reports it is better than yesterday and he is able to move it now whereas he could not yesterday.  -CL     Pain Intervention(s) Repositioned  -CL     Row Name 09/28/22 1500          Plan of Care Review    Plan of Care Reviewed With patient  -CL     Outcome Evaluation Pt is a 77 y.o. male with a complex medical hx who had recent hospitalization for bowel obstruction and returns with L elbow pain, L calf wound and fever. Pt has hx of ORIF to elbow in the past and is afraid that the hardware is loose. Dx with olecranon bursitis and possible septic arthropathy. Ortho consult ordered.  Pt also found to be hypokalemic and hypomagnesemic.  PMH: includes COPD, HF, CAD, chronic LBP, HTN, alcholic cirrhosis w/ ascities, prostate CA, pacemaker and DM. Pt lives with his wife in a [facility] with a basement  (he doesn't use) and no steps to enter. Pt reports that he is primarily 'homebound' using no AD to walk in the house and uses a RW to go out to doctor appointments.  Pt reports a fall where he tripped on the bedding at home but no other falls.  Upon evaluation, pt demonstrates ROM WFL BLEs and BUEs except painful ROM L elbow with extension limited 20 degrees from full due to pain. Pt with warmth, redness and edema L elbow. Pt performed bed mobility with min A for supine to sit and independence for sit to supine. He transferred with CGA and ambulated 60' with supervision, pushing IV pole for support. Distance limited by endurance.  PT educated pt on importance of ROM ex and pt performed shoulder shrugs, scap retraction and AROM to elbow, wrist and fingers within pain limits.  Pt would benefit from  PT at discharge.  -CL     Row Name 09/28/22 1503          Therapy Assessment/Plan (PT)    Rehab Potential (PT) good, to achieve stated therapy goals  -CL     Criteria for Skilled Interventions Met (PT) yes;skilled treatment is necessary  -CL     Therapy Frequency (PT) 5 times/wk  -CL     Predicted Duration of Therapy Intervention (PT) Until discharge  -CL     Row Name 09/28/22 1505          Vital Signs    Pre SpO2 (%) 96  -CL     O2 Delivery Pre Treatment supplemental O2  1.5L  -CL     Intra SpO2 (%) 95  -CL     O2 Delivery Intra Treatment room air  -CL     Post SpO2 (%) 99  -CL     O2 Delivery Post Treatment room air  -CL     Pre Patient Position Sitting  -CL     Intra Patient Position Standing  -CL     Post Patient Position Sitting  -CL     Row Name 09/28/22 1505          Positioning and Restraints    Pre-Treatment Position other (comment)  Sitting EOB  -CL     Post Treatment Position bed  -CL     In Bed sitting EOB;with nsg;call light within reach;encouraged to call for assist  -CL           User Key  (r) = Recorded By, (t) = Taken By, (c) = Cosigned By    Initials Name Provider Type    Ira Mahmood, PT  Physical Therapist               Outcome Measures     [facility] Name 09/28/22 1607 09/28/22 0800       How much help from another person do you currently need...    Turning from your back to your side while in flat bed without using bedrails? 4  -CL 4  -EH    Moving from lying on back to sitting on the side of a flat bed without bedrails? 3  -CL 4  -EH    Moving to and from a bed to a chair (including a wheelchair)? 4  -CL 3  -EH    Standing up from a chair using your arms (e.g., wheelchair, bedside chair)? 4  -CL 3  -EH    Climbing 3-5 steps with a railing? 1  -CL 3  -EH    To walk in hospital room? 4  -CL 3  -EH    AM-PAC 6 Clicks Score (PT) 20  -CL 20  -EH          User Key  (r) = Recorded By, (t) = Taken By, (c) = Cosigned By    Initials Name Provider Type     Jacqueline Siegel, RN Registered Nurse     Ira Wagner, PT Physical Therapist                             Physical Therapy Education                 Title: PT OT SLP Therapies (Done)     Topic: Physical Therapy (Done)     Point: Mobility training (Done)     Learning Progress Summary           Patient Acceptance, E,TB, VU by  at 9/28/2022 1608                   Point: Home exercise program (Done)     Learning Progress Summary           Patient Acceptance, E,TB, VU by  at 9/28/2022 1608                   Point: Body mechanics (Done)     Learning Progress Summary           Patient Acceptance, E,TB, VU by  at 9/28/2022 1608                   Point: Precautions (Done)     Learning Progress Summary           Patient Acceptance, E,TB, VU by  at 9/28/2022 1608                               User Key     Initials Effective Dates Name Provider Type Discipline     03/02/22 -  Ira Wagner PT Physical Therapist PT              PT Recommendation and Plan  Planned Therapy Interventions (PT): bed mobility training, gait training, balance training, patient/family education, ROM (range of motion), strengthening, transfer training  Plan of Care Reviewed  With: patient  Outcome Evaluation: Pt is a 77 y.o. male with a complex medical hx who had recent hospitalization for bowel obstruction and returns with L elbow pain, L calf wound and fever. Pt has hx of ORIF to elbow in the past and is afraid that the hardware is loose. Dx with olecranon bursitis and possible septic arthropathy. Ortho consult ordered.  Pt also found to be hypokalemic and hypomagnesemic.  PMH: includes COPD, HF, CAD, chronic LBP, HTN, alcholic cirrhosis w/ ascities, prostate CA, pacemaker and DM. Pt lives with his wife in a [facility] with a basement (he doesn't use) and no steps to enter. Pt reports that he is primarily 'homebound' using no AD to walk in the house and uses a RW to go out to doctor appointments.  Pt reports a fall where he tripped on the bedding at home but no other falls.  Upon evaluation, pt demonstrates ROM WFL BLEs and BUEs except painful ROM L elbow with extension limited 20 degrees from full due to pain. Pt with warmth, redness and edema L elbow. Pt performed bed mobility with min A for supine to sit and independence for sit to supine. He transferred with CGA and ambulated 60' with supervision, pushing IV pole for support. Distance limited by endurance.  PT educated pt on importance of ROM ex and pt performed shoulder shrugs, scap retraction and AROM to elbow, wrist and fingers within pain limits.  Pt would benefit from HH PT at discharge.     Time Calculation:    PT Charges     Row Name 09/28/22 1609             Time Calculation    Start Time 1414  -CL      Stop Time 1438  -CL      Time Calculation (min) 24 min  -CL      PT Received On 09/28/22  -CL      PT - Next Appointment 09/29/22  -CL      PT Goal Re-Cert Due Date 10/12/22  -CL              Time Calculation- PT    Total Timed Code Minutes- PT 10 minute(s)  -CL            User Key  (r) = Recorded By, (t) = Taken By, (c) = Cosigned By    Initials Name Provider Type    Ira Mahmood PT Physical Therapist               Therapy Charges for Today     Code Description Service Date Service Provider Modifiers Qty    25638832479 HC PT EVAL MOD COMPLEXITY 4 9/28/2022 Ira Wagner, PT GP 1    12623478034 HC PT THER PROC EA 15 MIN 9/28/2022 Ira Wagner, PT GP 1          PT G-Codes  AM-PAC 6 Clicks Score (PT): 20    Ira Wagner, PT  9/28/2022

## 2024-12-11 NOTE — PLAN OF CARE
Problem: Pain, Acute (Adult)  Goal: Acceptable Pain Control/Comfort Level  Outcome: Ongoing (interventions implemented as appropriate)        
Problem: Pain, Chronic (Adult)  Goal: Acceptable Pain/Comfort Level and Functional Ability  Outcome: Ongoing (interventions implemented as appropriate)        
Problem: Patient Care Overview  Goal: Interprofessional Rounds/Family Conf  Outcome: Ongoing (interventions implemented as appropriate)        
Problem: Patient Care Overview  Goal: Plan of Care Review  Outcome: Ongoing (interventions implemented as appropriate)      Problem: Pain, Acute (Adult)  Goal: Identify Related Risk Factors and Signs and Symptoms  Outcome: Ongoing (interventions implemented as appropriate)    Goal: Acceptable Pain Control/Comfort Level  Outcome: Ongoing (interventions implemented as appropriate)      Problem: Pain, Chronic (Adult)  Goal: Identify Related Risk Factors and Signs and Symptoms  Outcome: Ongoing (interventions implemented as appropriate)    Goal: Acceptable Pain/Comfort Level and Functional Ability  Outcome: Ongoing (interventions implemented as appropriate)        
Problem: Patient Care Overview  Goal: Plan of Care Review  Outcome: Ongoing (interventions implemented as appropriate)    Goal: Individualization and Mutuality  Outcome: Ongoing (interventions implemented as appropriate)      Problem: Pain, Acute (Adult)  Goal: Identify Related Risk Factors and Signs and Symptoms  Outcome: Ongoing (interventions implemented as appropriate)    Goal: Acceptable Pain Control/Comfort Level  Outcome: Ongoing (interventions implemented as appropriate)      Problem: Pain, Chronic (Adult)  Goal: Identify Related Risk Factors and Signs and Symptoms  Outcome: Ongoing (interventions implemented as appropriate)    Goal: Acceptable Pain/Comfort Level and Functional Ability  Outcome: Ongoing (interventions implemented as appropriate)        
Problem: Patient Care Overview  Goal: Plan of Care Review  Outcome: Ongoing (interventions implemented as appropriate)   12/04/19 0580   Coping/Psychosocial   Plan of Care Reviewed With patient     Goal: Individualization and Mutuality  Outcome: Ongoing (interventions implemented as appropriate)    Goal: Discharge Needs Assessment  Outcome: Ongoing (interventions implemented as appropriate)    Goal: Interprofessional Rounds/Family Conf  Outcome: Ongoing (interventions implemented as appropriate)      Problem: Pain, Acute (Adult)  Goal: Identify Related Risk Factors and Signs and Symptoms  Outcome: Ongoing (interventions implemented as appropriate)    Goal: Acceptable Pain Control/Comfort Level  Outcome: Ongoing (interventions implemented as appropriate)      Problem: Pain, Chronic (Adult)  Goal: Identify Related Risk Factors and Signs and Symptoms  Outcome: Ongoing (interventions implemented as appropriate)    Goal: Acceptable Pain/Comfort Level and Functional Ability  Outcome: Ongoing (interventions implemented as appropriate)        
Problem: Patient Care Overview  Goal: Plan of Care Review  Outcome: Ongoing (interventions implemented as appropriate)   12/05/19 0417   Coping/Psychosocial   Plan of Care Reviewed With patient   Plan of Care Review   Progress no change   OTHER   Outcome Summary Patient rested well through the night. Continues to complain of pain in the upper back. Patient is receiving iv pain medication every 4 hours. Tentitive heart cath planned for later today.       Problem: Pain, Acute (Adult)  Goal: Identify Related Risk Factors and Signs and Symptoms  Outcome: Ongoing (interventions implemented as appropriate)    Goal: Acceptable Pain Control/Comfort Level  Outcome: Ongoing (interventions implemented as appropriate)      Problem: Pain, Chronic (Adult)  Goal: Identify Related Risk Factors and Signs and Symptoms  Outcome: Ongoing (interventions implemented as appropriate)    Goal: Acceptable Pain/Comfort Level and Functional Ability  Outcome: Ongoing (interventions implemented as appropriate)        
[de-identified] : 74 y/o To[place] speaking Male with PMHx of prostate CA 2023 s/p radiation, elevated BP w/o hx HTN, HLD, DM, CKD stage 2, anemia, who was recently admitted at [facility] for feelings of dizziness/ off balance while walking, referred by neurology for NPH workup.   Ad part of hospital workup: - CTH negative for acute path - CTA with deposition of calcified plaque with mild to moderate stenosis in association with the cavernous and supraclinoid right internal carotid artery and mild stenosis involving the cavernous and supraclinoid left internal carotid artery. Not a candidate for acute intervention.  - MRI negative for acute infact; with prominence of the temporal horns; findings may be due to volume loss though early hydrocephalus cannot be excluded.  He saw neurology outpatient for follow- up. Was referred for ENT, vestibular therapy for dizziness, referred to neurosurgery due to MRI findings for NPH workup.  He saw ENT 12/9 and referred for vestibular therapy.   Presents today for evaluation.  Accompanied by his son who assists in ROS/HPI.  Endorses trouble walking described as "wobbliness" for about one month. Denies worsening with turning.  Denies urinary incontinence but endorses trouble initiating his urine.  Denies memory issues.  Denies neck pain, weakness of arms/legs. Notes some numbness to the front of his left thigh.   Neurology: KYE Cardoza Endo: Eliza Awan  ENT: Nino Dickey
[de-identified] : 74 y/o [place] speaking Male with PMHx of prostate CA 2023 s/p radiation, elevated BP w/o hx HTN, HLD, DM, CKD stage 2, anemia, who was recently admitted at [facility] for feelings of dizziness/ off balance while walking, referred by neurology for NPH workup.   Ad part of hospital workup: - CTH negative for acute path - CTA with deposition of calcified plaque with mild to moderate stenosis in association with the cavernous and supraclinoid right internal carotid artery and mild stenosis involving the cavernous and supraclinoid left internal carotid artery. Not a candidate for acute intervention.  - MRI negative for acute infact; with prominence of the temporal horns; findings may be due to volume loss though early hydrocephalus cannot be excluded.  He saw neurology outpatient for follow- up. Was referred for ENT, vestibular therapy for dizziness, referred to neurosurgery due to MRI findings for NPH workup.  He saw ENT 12/9 and referred for vestibular therapy.   Presents today for evaluation.  Accompanied by his son who assists in ROS/HPI.  Endorses trouble walking described as "wobbliness" for about one month. Denies worsening with turning.  Denies urinary incontinence but endorses trouble initiating his urine.  Denies memory issues.  Denies neck pain, weakness of arms/legs. Notes some numbness to the front of his left thigh.   Neurology: KYE Cardoza Endo: Eliza Awan  ENT: Nino Dickey
[de-identified] : 76 y/o [place] speaking Male with PMHx of prostate CA 2023 s/p radiation, elevated BP w/o hx HTN, HLD, DM, CKD stage 2, anemia, who was recently admitted at [facility] for feelings of dizziness/ off balance while walking, referred by neurology for NPH workup.   Ad part of hospital workup: - CTH negative for acute path - CTA with deposition of calcified plaque with mild to moderate stenosis in association with the cavernous and supraclinoid right internal carotid artery and mild stenosis involving the cavernous and supraclinoid left internal carotid artery. Not a candidate for acute intervention.  - MRI negative for acute infact; with prominence of the temporal horns; findings may be due to volume loss though early hydrocephalus cannot be excluded.  He saw neurology outpatient for follow- up. Was referred for ENT, vestibular therapy for dizziness, referred to neurosurgery due to MRI findings for NPH workup.  He saw ENT 12/9 and referred for vestibular therapy.   Presents today for evaluation.  Accompanied by his son who assists in ROS/HPI.  Endorses trouble walking described as "wobbliness" for about one month. Denies worsening with turning.  Denies urinary incontinence but endorses trouble initiating his urine.  Denies memory issues.  Denies neck pain, weakness of arms/legs. Notes some numbness to the front of his left thigh.   Neurology: KYE Cardoza Endo: Eliza Awan  ENT: Nino Dickey
